# Patient Record
Sex: FEMALE | Race: WHITE | NOT HISPANIC OR LATINO | Employment: OTHER | URBAN - METROPOLITAN AREA
[De-identification: names, ages, dates, MRNs, and addresses within clinical notes are randomized per-mention and may not be internally consistent; named-entity substitution may affect disease eponyms.]

---

## 2017-08-23 ENCOUNTER — NURSE TRIAGE (OUTPATIENT)
Dept: NURSING | Facility: CLINIC | Age: 72
End: 2017-08-23

## 2017-08-23 NOTE — TELEPHONE ENCOUNTER
Caller had called for regular appointment to  be seen for  foot edema and to reestablish care at clinic; Caller sent  To [facility] for triage of intermittent  episodes of localized hives on arms and face for 1 week  Reviewed home care and will try antihistamine  Returned call to scheduling to arrange  Visit this week    Reason for Disposition    Hives persist > 1 week    Protocols used: HIVES-ADULT-  Berta Bills RN  [facility]

## 2017-08-25 ENCOUNTER — OFFICE VISIT (OUTPATIENT)
Dept: FAMILY MEDICINE | Facility: CLINIC | Age: 72
End: 2017-08-25
Payer: COMMERCIAL

## 2017-08-25 VITALS
TEMPERATURE: 97.6 F | WEIGHT: 257 LBS | SYSTOLIC BLOOD PRESSURE: 144 MMHG | HEART RATE: 69 BPM | HEIGHT: 66 IN | BODY MASS INDEX: 41.3 KG/M2 | OXYGEN SATURATION: 98 % | DIASTOLIC BLOOD PRESSURE: 84 MMHG

## 2017-08-25 DIAGNOSIS — R60.0 LOWER EXTREMITY EDEMA: Primary | ICD-10-CM

## 2017-08-25 DIAGNOSIS — R21 RASH: ICD-10-CM

## 2017-08-25 DIAGNOSIS — E55.9 VITAMIN D DEFICIENCY: ICD-10-CM

## 2017-08-25 DIAGNOSIS — I10 HTN, GOAL BELOW 140/90: ICD-10-CM

## 2017-08-25 LAB
ALBUMIN UR-MCNC: NEGATIVE MG/DL
APPEARANCE UR: CLEAR
BILIRUB UR QL STRIP: NEGATIVE
COLOR UR AUTO: YELLOW
CRP SERPL-MCNC: 9.4 MG/L (ref 0–8)
GLUCOSE UR STRIP-MCNC: NEGATIVE MG/DL
HGB UR QL STRIP: ABNORMAL
KETONES UR STRIP-MCNC: NEGATIVE MG/DL
KOH PREP SPEC: ABNORMAL
LEUKOCYTE ESTERASE UR QL STRIP: NEGATIVE
MUCOUS THREADS #/AREA URNS LPF: PRESENT /LPF
NITRATE UR QL: NEGATIVE
NON-SQ EPI CELLS #/AREA URNS LPF: ABNORMAL /LPF
PH UR STRIP: 7 PH (ref 5–7)
RBC #/AREA URNS AUTO: ABNORMAL /HPF
SOURCE: ABNORMAL
SP GR UR STRIP: 1.01 (ref 1–1.03)
SPECIMEN SOURCE: ABNORMAL
UROBILINOGEN UR STRIP-ACNC: 0.2 EU/DL (ref 0.2–1)
WBC #/AREA URNS AUTO: ABNORMAL /HPF

## 2017-08-25 PROCEDURE — 99000 SPECIMEN HANDLING OFFICE-LAB: CPT | Performed by: PHYSICIAN ASSISTANT

## 2017-08-25 PROCEDURE — 86140 C-REACTIVE PROTEIN: CPT | Performed by: PHYSICIAN ASSISTANT

## 2017-08-25 PROCEDURE — 87220 TISSUE EXAM FOR FUNGI: CPT | Performed by: PHYSICIAN ASSISTANT

## 2017-08-25 PROCEDURE — 81001 URINALYSIS AUTO W/SCOPE: CPT | Performed by: PHYSICIAN ASSISTANT

## 2017-08-25 PROCEDURE — 99204 OFFICE O/P NEW MOD 45 MIN: CPT | Performed by: PHYSICIAN ASSISTANT

## 2017-08-25 PROCEDURE — 86618 LYME DISEASE ANTIBODY: CPT | Performed by: PHYSICIAN ASSISTANT

## 2017-08-25 PROCEDURE — 82043 UR ALBUMIN QUANTITATIVE: CPT | Performed by: PHYSICIAN ASSISTANT

## 2017-08-25 PROCEDURE — 36415 COLL VENOUS BLD VENIPUNCTURE: CPT | Performed by: PHYSICIAN ASSISTANT

## 2017-08-25 PROCEDURE — 82306 VITAMIN D 25 HYDROXY: CPT | Performed by: PHYSICIAN ASSISTANT

## 2017-08-25 PROCEDURE — 84443 ASSAY THYROID STIM HORMONE: CPT | Performed by: PHYSICIAN ASSISTANT

## 2017-08-25 PROCEDURE — 86617 LYME DISEASE ANTIBODY: CPT | Mod: 90 | Performed by: PHYSICIAN ASSISTANT

## 2017-08-25 PROCEDURE — 80048 BASIC METABOLIC PNL TOTAL CA: CPT | Performed by: PHYSICIAN ASSISTANT

## 2017-08-25 RX ORDER — CLOTRIMAZOLE 1 %
CREAM (GRAM) TOPICAL 2 TIMES DAILY
Qty: 15 G | Refills: 1 | Status: SHIPPED | OUTPATIENT
Start: 2017-08-25 | End: 2017-09-22

## 2017-08-25 RX ORDER — FLUCONAZOLE 150 MG/1
150 TABLET ORAL
Qty: 4 TABLET | Refills: 0 | Status: SHIPPED | OUTPATIENT
Start: 2017-08-25 | End: 2017-09-22

## 2017-08-25 RX ORDER — LISINOPRIL 5 MG/1
10 TABLET ORAL DAILY
Qty: 30 TABLET | Refills: 2 | Status: SHIPPED | OUTPATIENT
Start: 2017-08-25 | End: 2017-08-25

## 2017-08-25 RX ORDER — LISINOPRIL 5 MG/1
5 TABLET ORAL DAILY
Qty: 30 TABLET | Refills: 2 | Status: SHIPPED | OUTPATIENT
Start: 2017-08-25 | End: 2018-02-14

## 2017-08-25 NOTE — NURSING NOTE
"Chief Complaint   Patient presents with     Hives     swollen feet       Initial /86 (BP Location: Right arm, Cuff Size: Adult Large)  Pulse 69  Temp 97.6  F (36.4  C) (Oral)  Ht 5' 6\" (1.676 m)  Wt 257 lb (116.6 kg)  SpO2 98%  BMI 41.48 kg/m2 Estimated body mass index is 41.48 kg/(m^2) as calculated from the following:    Height as of this encounter: 5' 6\" (1.676 m).    Weight as of this encounter: 257 lb (116.6 kg).  Medication Reconciliation: complete   Andrzej Garrison CMA      "

## 2017-08-25 NOTE — LETTER
August 30, 2017      Critsy Bailey  [address] [facility] [address]        Barak Muniz,    It was nice meeting you last week.  A copy of your labs from the visit is included.     The lyme testing shows that there is no acute/current infection but does show a history of past infections.    The urine showed no protein or infections. But there was just a small amount of blood. We'll repeat this at your next office visit    Screening of the kidneys, electrolytes and for diabetes looked within the expected ranges.    Your thyroid test was normal.     Finally, the vitamin D was just a bit low. I'd recommend taking 5000 international units the next 2-3 months and rechecking.     We'll plan to see you at your next visit. Let us know any questions in the meantime and make sure to check your blood pressure before that!    Kulwant Daley

## 2017-08-25 NOTE — MR AVS SNAPSHOT
"              After Visit Summary   8/25/2017    Crsity Bailey    MRN: [id number]           Patient Information     Date Of Birth          1945        Visit Information        Provider Department      8/25/2017 8:20 AM Manoj Daley PA-C [facility]        Today's Diagnoses     Lower extremity edema    -  1    HTN, goal below 140/90        Rash        Vitamin D deficiency           Follow-ups after your visit        Your next 10 appointments already scheduled     Sep 07, 2017 10:50 AM CDT   [facility] Visit with Jeana Duke MD   [facility]    [address] [facility] [address] [phone]           Bring a current list of meds and any records pertaining to this visit. For Physicals, please bring immunization records and any forms needing to be filled out. Please arrive 10 minutes early to complete paperwork.              Who to contact     If you have questions or need follow up information about today's clinic visit or your schedule please contact [facility] directly at [phone].  Normal or non-critical lab and imaging results will be communicated to you by Anaergiahart, letter or phone within 4 business days after the clinic has received the results. If you do not hear from us within 7 days, please contact the clinic through [email or web address] or phone. If you have a critical or abnormal lab result, we will notify you by phone as soon as possible.  Submit refill requests through This Week In or call your pharmacy and they will forward the refill request to us. Please allow 3 business days for your refill to be completed.          Additional Information About Your Visit        Anaergiahart Information     This Week In lets you send messages to your doctor, view your test results, renew your prescriptions, schedule appointments and more. To sign up, go to [email or web address] . Click on \"Log in\" on the left side of the screen, which " "will take you to the Welcome page. Then click on \"Sign up Now\" on the right side of the page.     You will be asked to enter the access code listed below, as well as some personal information. Please follow the directions to create your username and password.     Your access code is: [email or web address]  Expires: 2017  8:52 AM     Your access code will  in 90 days. If you need help or a new code, please call your [place] clinic or [phone].        Care EveryWhere ID     This is your Care EveryWhere ID. This could be used by other organizations to access your [place] medical records  [id number]        Your Vitals Were     Pulse Temperature Height Pulse Oximetry BMI (Body Mass Index)       69 97.6  F (36.4  C) (Oral) 5' 6\" (1.676 m) 98% 41.48 kg/m2        Blood Pressure from Last 3 Encounters:   17 144/84   03/24/15 128/87   14 126/80    Weight from Last 3 Encounters:   17 257 lb (116.6 kg)   03/24/15 255 lb (115.7 kg)   14 254 lb (115.2 kg)              We Performed the Following     *UA reflex to Microscopic and Culture ([place] and [facility] (except [place] and [place])     Albumin Random Urine Quantitative with Creat Ratio     Basic metabolic panel     KOH prep (skin, hair or nails only)     Lyme Disease Sarina with reflex to WB Serum     TSH with free T4 reflex     Vitamin D Deficiency          Today's Medication Changes          These changes are accurate as of: 17  8:52 AM.  If you have any questions, ask your nurse or doctor.               Start taking these medicines.        Dose/Directions    clotrimazole 1 % cream   Commonly known as:  LOTRIMIN   Used for:  Rash   Started by:  Manoj Daley PA-C        Apply topically 2 times daily   Quantity:  15 g   Refills:  1       fluconazole 150 MG tablet   Commonly known as:  DIFLUCAN   Used for:  Rash   Started by:  Manoj Daley PA-C        Dose:  150 mg   Take 1 tablet (150 mg) by mouth every 3 days "   Quantity:  4 tablet   Refills:  0       order for DME   Used for:  Lower extremity edema   Started by:  Manoj Daley PA-C        Equipment being ordered: Compression stockings 20-30mmHg   Quantity:  2 Package   Refills:  0         These medicines have changed or have updated prescriptions.        Dose/Directions    lisinopril 5 MG tablet   Commonly known as:  PRINIVIL/ZESTRIL   This may have changed:  medication strength   Used for:  HTN, goal below 140/90   Changed by:  Manoj Daley PA-C        Dose:  10 mg   Take 2 tablets (10 mg) by mouth daily ONE DAILY for HTN   Quantity:  30 tablet   Refills:  2            Where to get your medicines      These medications were sent to [id number] - [facility] [address]  [address] [facility] [address]    Hours:  9Am-9Pm (M-F) 9Am-6Pm (S&S) Phone:  [phone]     clotrimazole 1 % cream    fluconazole 150 MG tablet    lisinopril 5 MG tablet         Some of these will need a paper prescription and others can be bought over the counter.  Ask your nurse if you have questions.     Bring a paper prescription for each of these medications     order for DME                Primary Care Provider    None Specified       No primary provider on file.        Equal Access to Services     MICHELLE WISE AH: Janet Martin, watobin esposito, qatonya kaalmada andrea, larry camp. So [facility] [phone].    ATENCIÓN: Si habla español, tiene a pompa disposición servicios gratuitos de asistencia lingüística. Llame al [phone].    We comply with applicable federal civil rights laws and [place] laws. We do not discriminate on the basis of race, color, national origin, age, disability sex, sexual orientation or gender identity.            Thank you!     Thank you for choosing [facility]  for your care. Our goal is always to provide you with excellent care. Hearing back from our patients is  one way we can continue to improve our services. Please take a few minutes to complete the written survey that you may receive in the mail after your visit with us. Thank you!             Your Updated Medication List - Protect others around you: Learn how to safely use, store and throw away your medicines at www.disposemymeds.org.          This list is accurate as of: 8/25/17  8:52 AM.  Always use your most recent med list.                   Brand Name Dispense Instructions for use Diagnosis    clotrimazole 1 % cream    LOTRIMIN    15 g    Apply topically 2 times daily    Rash       fluconazole 150 MG tablet    DIFLUCAN    4 tablet    Take 1 tablet (150 mg) by mouth every 3 days    Rash       lisinopril 5 MG tablet    PRINIVIL/ZESTRIL    30 tablet    Take 2 tablets (10 mg) by mouth daily ONE DAILY for HTN    HTN, goal below 140/90       order for DME     2 Package    Equipment being ordered: Compression stockings 20-30mmHg    Lower extremity edema

## 2017-08-25 NOTE — PROGRESS NOTES
SUBJECTIVE:   Cristy Bailey is a 72 year old female who presents to clinic today for the following health issues:    Patient presents to clinic for the following two reasons  She has not received care for the past few years, reportedly  She presents today in wheelchair   -she notes she developed weakness, difficulty walking and saw 3 neurologist who could  not find anything wrong  She is able to stand and wash dishes as long as she has something to hold onto    Rash      Duration: 10 days ago    Description  Location: arm, neck, face chest,   Itching: severe    Intensity:  moderate    Accompanying signs and symptoms: red, botchy, scaly, round    History (similar episodes/previous evaluation): None    Precipitating or alleviating factors:  New exposures:  None  Recent travel: no      Therapies tried and outcome: Baking soda and water, Cortizone 10     -Patient notes a new onset rash to her extremities x 10 days  -eventually did shift to her trunk and face  -there is no swelling of the lips, tongue;   -she denies shortness of breath or wheezing  -They are round; very itchy  -denies any new medications, foods, environmental exposure, no recent travel; no new soaps/detergents  -there are no new fevers  -cortisone 10 has helped some on the lesions      Musculoskeletal problem/pain      Duration: 1 year    Description  Location: bilateral feet    Intensity:  severe    Accompanying signs and symptoms: swelling and pressure    History  Previous similar problem: no   Previous evaluation:  none    Precipitating or alleviating factors:  Trauma or overuse: no   Aggravating factors include: none    Therapies tried and outcome: massage and soak in water    -Patient notes an intermittent swelling in the lower extremities, now staying more often  -she denies any shortness of breath or cough  -they seem improved in the morning, after sleeping in a recliner  -denies any major weight change but doesn't weight herself  regularly  -there is no increase in urination; goes regularly throughout the day and night  -limits any salt on foods      Problem list and histories reviewed & adjusted, as indicated.  Additional history: as documented    Patient Active Problem List   Diagnosis     HTN (hypertension)     Hyperlipidemia LDL goal <160     Abnormal glucose     Obesity     Advanced directives, counseling/discussion     Leg weakness, bilateral     CRP elevated     Vitamin D deficiency     Health Care Home     HTN, goal below 140/90     Constipation     Leg weakness     Constipation     Myalgia and myositis     Past Surgical History:   Procedure Laterality Date     BIOPSY OF UTERUS LINING  3/2010    negative.      C C-SEC ONLY,PREV C-SEC      c-sec x 3      COLONOSCOPY  2/21/10    benign findings. advised 10 yr f/u.        Social History   Substance Use Topics     Smoking status: Former Smoker     Quit date: 1984     Smokeless tobacco: Never Used     Alcohol use No     Family History   Problem Relation Age of Onset     CEREBROVASCULAR DISEASE Father      -stroke at age 80     Family History Negative Mother       Diedn her 80's of unknown causes.  Pt otherwise unaware of her health hx.      Unknown/Adopted Mother      DIABETES Brother      (Christian)  of DM complications at age 50.     Alcohol/Drug Brother      Christian     C.A.D. Brother      Christian.      HEART DISEASE Brother      Christian--MI age 50.     Family History Negative Brother      Family History Negative Brother      Family History Negative Sister      Family History Negative Sister      Family History Negative Son      Family History Negative Daughter      Family History Negative Daughter              Reviewed and updated as needed this visit by clinical staffTobacco  Allergies  Meds  Med Hx  Surg Hx  Fam Hx  Soc Hx      Reviewed and updated as needed this visit by Provider         ROS:  Constitutional, HEENT, cardiovascular, pulmonary, gi and gu systems are  "negative, except as otherwise noted.      OBJECTIVE:   /84  Pulse 69  Temp 97.6  F (36.4  C) (Oral)  Ht 5' 6\" (1.676 m)  Wt 257 lb (116.6 kg)  SpO2 98%  BMI 41.48 kg/m2  Body mass index is 41.48 kg/(m^2).  GENERAL: healthy, alert and no distress  NECK: no JVD  RESP: lungs clear to auscultation - no rales, rhonchi or wheezes  CV: regular rates and rhythm, normal S1 S2, no S3 or S4 and no murmur, click or rub  MS: there is 1+ pitting edema in bilateral extremeties  SKIN: along the left forearm, sternal border and left face/neck there are circular lesions with red raised borders and central clearing    Diagnostic Test Results:  See A/P  KOH Prep positive    ASSESSMENT/PLAN:   1. HTN, goal below 140/90  Uncontrolled today. Off of Rx for at least 6 months. SHe does take this at home and reports it is normally controlled without medication but given poor control today will see how it is at 5mg. Recheck at pharmacy next week. In clinic in one month  - Basic metabolic panel  - lisinopril (PRINIVIL/ZESTRIL) 5 MG tablet; Take 1 tablet (5 mg) by mouth daily ONE DAILY for HTN  Dispense: 30 tablet; Refill: 2    2. Lower extremity edema  Suspect this is more dependent than anything. She is in wheelchair daily for a mysterious reason, cause undetermined. Lungs are clear, there is no cough. Updating labs and will have her start compression stockings  - Basic metabolic panel  - TSH with free T4 reflex  - *UA reflex to Microscopic and Culture ([place] [facility] (except [place] and [place])  - Albumin Random Urine Quantitative with Creat Ratio  - order for DME; Equipment being ordered: Compression stockings 20-30mmHg  Dispense: 2 Package; Refill: 0    3. Rash  KOH positive. Less likely urticaria, favoring tinea. Given the spread and unknown LFTs will have her do topical and short course fluconazole orally dosed. Follow up if not improving  - KOH prep (skin, hair or nails only)  - Lyme Disease Sarina with reflex " to WB Serum  - clotrimazole (LOTRIMIN) 1 % cream; Apply topically 2 times daily  Dispense: 15 g; Refill: 1  - fluconazole (DIFLUCAN) 150 MG tablet; Take 1 tablet (150 mg) by mouth every 3 days  Dispense: 4 tablet; Refill: 0    4. Vitamin D deficiency  - Vitamin D Deficiency    She will follow up in clinic in 4 weeks for update and to discuss other concerns.    I spent >50 % of the 35 minutes in face to face counselling or coordination of care for the above issues as she transitions back to medical care.      Manoj Daley PA-C  [facility]

## 2017-08-26 LAB
ANION GAP SERPL CALCULATED.3IONS-SCNC: 8 MMOL/L (ref 3–14)
B BURGDOR IGG+IGM SER QL: 3.41 (ref 0–0.89)
BUN SERPL-MCNC: 13 MG/DL (ref 7–30)
CALCIUM SERPL-MCNC: 8.6 MG/DL (ref 8.5–10.1)
CHLORIDE SERPL-SCNC: 106 MMOL/L (ref 94–109)
CO2 SERPL-SCNC: 26 MMOL/L (ref 20–32)
CREAT SERPL-MCNC: 0.63 MG/DL (ref 0.52–1.04)
CREAT UR-MCNC: 130 MG/DL
GFR SERPL CREATININE-BSD FRML MDRD: >90 ML/MIN/1.7M2
GLUCOSE SERPL-MCNC: 91 MG/DL (ref 70–99)
MICROALBUMIN UR-MCNC: 8 MG/L
MICROALBUMIN/CREAT UR: 6.01 MG/G CR (ref 0–25)
POTASSIUM SERPL-SCNC: 3.9 MMOL/L (ref 3.4–5.3)
SODIUM SERPL-SCNC: 140 MMOL/L (ref 133–144)
TSH SERPL DL<=0.005 MIU/L-ACNC: 2 MU/L (ref 0.4–4)

## 2017-08-27 LAB — DEPRECATED CALCIDIOL+CALCIFEROL SERPL-MC: 24 UG/L (ref 20–75)

## 2017-08-28 LAB
B BURGDOR IGG SER QL IB: NEGATIVE
B BURGDOR IGM SER QL IB: NEGATIVE

## 2017-09-22 ENCOUNTER — OFFICE VISIT (OUTPATIENT)
Dept: FAMILY MEDICINE | Facility: CLINIC | Age: 72
End: 2017-09-22
Payer: COMMERCIAL

## 2017-09-22 VITALS
TEMPERATURE: 97.8 F | DIASTOLIC BLOOD PRESSURE: 82 MMHG | BODY MASS INDEX: 41.72 KG/M2 | SYSTOLIC BLOOD PRESSURE: 136 MMHG | HEART RATE: 72 BPM | RESPIRATION RATE: 20 BRPM | WEIGHT: 258.5 LBS

## 2017-09-22 DIAGNOSIS — I10 HTN, GOAL BELOW 140/90: ICD-10-CM

## 2017-09-22 DIAGNOSIS — R29.898 WEAKNESS OF BOTH LOWER EXTREMITIES: ICD-10-CM

## 2017-09-22 DIAGNOSIS — R26.81 UNSTEADINESS ON FEET: Primary | ICD-10-CM

## 2017-09-22 DIAGNOSIS — Z23 NEED FOR PROPHYLACTIC VACCINATION AND INOCULATION AGAINST INFLUENZA: ICD-10-CM

## 2017-09-22 DIAGNOSIS — E66.01 MORBID OBESITY, UNSPECIFIED OBESITY TYPE (H): ICD-10-CM

## 2017-09-22 PROCEDURE — G0008 ADMIN INFLUENZA VIRUS VAC: HCPCS | Performed by: PHYSICIAN ASSISTANT

## 2017-09-22 PROCEDURE — 90662 IIV NO PRSV INCREASED AG IM: CPT | Performed by: PHYSICIAN ASSISTANT

## 2017-09-22 PROCEDURE — 99214 OFFICE O/P EST MOD 30 MIN: CPT | Mod: 25 | Performed by: PHYSICIAN ASSISTANT

## 2017-09-22 RX ORDER — LISINOPRIL 5 MG/1
5 TABLET ORAL DAILY
Qty: 90 TABLET | Refills: 1 | Status: SHIPPED | OUTPATIENT
Start: 2017-09-22 | End: 2018-02-13

## 2017-09-22 RX ORDER — LISINOPRIL 5 MG/1
5 TABLET ORAL DAILY
Qty: 30 TABLET | Refills: 2 | Status: CANCELLED | OUTPATIENT
Start: 2017-09-22

## 2017-09-22 NOTE — NURSING NOTE
"Chief Complaint   Patient presents with     Hypertension       Initial /84  Pulse 72  Temp 97.8  F (36.6  C) (Oral)  Resp 20  Wt 258 lb 8 oz (117.3 kg)  BMI 41.72 kg/m2 Estimated body mass index is 41.72 kg/(m^2) as calculated from the following:    Height as of 8/25/17: 5' 6\" (1.676 m).    Weight as of this encounter: 258 lb 8 oz (117.3 kg).  Medication Reconciliation: complete   Libertad FITZPATRICK, LEANDRO,AAMA      "

## 2017-09-22 NOTE — MR AVS SNAPSHOT
After Visit Summary   9/22/2017    Cristy Bailey    MRN: [id number]           Patient Information     Date Of Birth          1945        Visit Information        Provider Department      9/22/2017 8:00 AM Manoj Daley PA-C [facility]        Today's Diagnoses     Unsteadiness on feet    -  1    HTN, goal below 140/90        Weakness of both lower extremities          Care Instructions    We'll figure out your leg weakness/walking - please continue to be motivated!    Here's our plan:  1. Neurologist (brain/nerve doctors)  2. Rheumatology/Infectious disease  3. Occupational therapy/physical therapy          Follow-ups after your visit        Additional Services     NEUROLOGY ADULT REFERRAL       Your provider has referred you for the following:   Consult at [facility]: [facility] [phone]   [email or web address]  [facility]: [facility] [phone]   [email or web address]    Please be aware that coverage of these services is subject to the terms and limitations of your health insurance plan.  Call member services at your health plan with any benefit or coverage questions.      Please bring the following with you to your appointment:    (1) Any X-Rays, CTs or MRIs which have been performed.  Contact the facility where they were done to arrange for  prior to your scheduled appointment.    (2) List of current medications  (3) This referral request   (4) Any documents/labs given to you for this referral                  Who to contact     If you have questions or need follow up information about today's clinic visit or your schedule please contact [facility] directly at [phone].  Normal or non-critical lab and imaging results will be communicated to you by MyChart, letter or phone within 4 business days after the clinic has received the results. If you do  "not hear from us within 7 days, please contact the clinic through [employer] or phone. If you have a critical or abnormal lab result, we will notify you by phone as soon as possible.  Submit refill requests through [employer] or call your pharmacy and they will forward the refill request to us. Please allow 3 business days for your refill to be completed.          Additional Information About Your Visit        [email or web address] Information     [employer] lets you send messages to your doctor, view your test results, renew your prescriptions, schedule appointments and more. To sign up, go to [email or web address] . Click on \"Log in\" on the left side of the screen, which will take you to the Welcome page. Then click on \"Sign up Now\" on the right side of the page.     You will be asked to enter the access code listed below, as well as some personal information. Please follow the directions to create your username and password.     Your access code is: [email or web address]  Expires: 2017  8:52 AM     Your access code will  in 90 days. If you need help or a new code, please call your [place] clinic or [phone].        Care EveryWhere ID     This is your Care EveryWhere ID. This could be used by other organizations to access your [place] medical records  [id number]        Your Vitals Were     Pulse Temperature Respirations BMI (Body Mass Index)          72 97.8  F (36.6  C) (Oral) 20 41.72 kg/m2         Blood Pressure from Last 3 Encounters:   17 136/82   17 144/84   03/24/15 128/87    Weight from Last 3 Encounters:   17 258 lb 8 oz (117.3 kg)   17 257 lb (116.6 kg)   03/24/15 255 lb (115.7 kg)              We Performed the Following     NEUROLOGY ADULT REFERRAL          Today's Medication Changes          These changes are accurate as of: 17  8:40 AM.  If you have any questions, ask your nurse or doctor.               These medicines have changed or have updated prescriptions.        " Dose/Directions    * lisinopril 5 MG tablet   Commonly known as:  PRINIVIL/ZESTRIL   This may have changed:  Another medication with the same name was added. Make sure you understand how and when to take each.   Used for:  HTN, goal below 140/90   Changed by:  Manoj Daley PA-C        Dose:  5 mg   Take 1 tablet (5 mg) by mouth daily ONE DAILY for HTN   Quantity:  30 tablet   Refills:  2       * lisinopril 5 MG tablet   Commonly known as:  PRINIVIL/ZESTRIL   This may have changed:  You were already taking a medication with the same name, and this prescription was added. Make sure you understand how and when to take each.   Used for:  HTN, goal below 140/90   Changed by:  Manoj Daley PA-C        Dose:  5 mg   Take 1 tablet (5 mg) by mouth daily   Quantity:  90 tablet   Refills:  1       * Notice:  This list has 2 medication(s) that are the same as other medications prescribed for you. Read the directions carefully, and ask your doctor or other care provider to review them with you.         Where to get your medicines      These medications were sent to [id number] - [facility] [address]  [address] [facility] [address]    Hours:  9Am-9Pm (M-F) 9Am-6Pm (S&S) Phone:  [phone]     lisinopril 5 MG tablet                Primary Care Provider    None Specified       No primary provider on file.        Equal Access to Services     [facility] Center[facility]IPRIANO : Hadii braden quintana Sosusie, waaxda luqadaha, qaybta kaalmada andrea, larry davalos . So [facility] [phone].    ATENCIÓN: Si habla español, tiene a pompa disposición servicios gratuitos de asistencia lingüística. Llame al [phone].    We comply with applicable federal civil rights laws and [place] laws. We do not discriminate on the basis of race, color, national origin, age, disability sex, sexual orientation or gender identity.            Thank you!     Thank you for choosing [facility]  [facility]  for your care. Our goal is always to provide you with excellent care. Hearing back from our patients is one way we can continue to improve our services. Please take a few minutes to complete the written survey that you may receive in the mail after your visit with us. Thank you!             Your Updated Medication List - Protect others around you: Learn how to safely use, store and throw away your medicines at www.disposemymeds.org.          This list is accurate as of: 9/22/17  8:40 AM.  Always use your most recent med list.                   Brand Name Dispense Instructions for use Diagnosis    * lisinopril 5 MG tablet    PRINIVIL/ZESTRIL    30 tablet    Take 1 tablet (5 mg) by mouth daily ONE DAILY for HTN    HTN, goal below 140/90       * lisinopril 5 MG tablet    PRINIVIL/ZESTRIL    90 tablet    Take 1 tablet (5 mg) by mouth daily    HTN, goal below 140/90       order for DME     2 Package    Equipment being ordered: Compression stockings 20-30mmHg    Lower extremity edema       * Notice:  This list has 2 medication(s) that are the same as other medications prescribed for you. Read the directions carefully, and ask your doctor or other care provider to review them with you.

## 2017-09-22 NOTE — PATIENT INSTRUCTIONS
We'll figure out your leg weakness/walking - please continue to be motivated!    Here's our plan:  1. Neurologist (brain/nerve doctors)  2. Rheumatology/Infectious disease  3. Occupational therapy/physical therapy

## 2017-09-22 NOTE — PROGRESS NOTES
"  SUBJECTIVE:   Cristy Bailey is a 72 year old female who presents to clinic today for the following health issues:    Hypertension Follow-up      Outpatient blood pressures are being checked at home.  Results are within normal range per Pt.    Low Salt Diet: low salt    Amount of exercise or physical activity: None    Problems taking medications regularly: No    Medication side effects: none     Diet: regular (no restrictions) and low salt    -Patient presents to follow up for blood pressure checks  -We started her on lisinopril 5mg and she has tolerated this well  -She notes that her BP at home, she has been checking regularly   -she has 2 separate cuffs and notes consistently healthy    -getting around 120-124/69-71 almost always   -elevated initially today but she notes some nervousness    LEG SWELLING:  Swelling is improved in the morning and when removing the compression stockings  They are very difficult to put on, mildly irritating to wear but she and her spouse are diligent about wearing them  Actually notes that when she takes the swelling stays off for a good period of time  Feet are much smaller - actually fitting into some of her shoes    RASH  -she started and finished the cream and oral Rx for tinea   -tolerated the medications well  -rash is improved    LEG WEAKNESS/IMBALANCE:  Patient notes an insidious onset of leg weakness beginning around 2012/13  She reports she used to walk quite well, even quickly  Slowly her strength degraded, she felt slower and slower; felt legs weakening after too much time on them; moving slower  She has now been in a wheel chair for years  Notably, she describes symptoms like  \"she cant balance\", \"hard to describe\", \"feels off in my hips\"; Feeling unsteady  DOES NOT feel dizzy  No vision changes  Denies numbness and tingling  Labs grossly normal save for elevated CRP (mild - 9.4), low vitamin D      Problem list and histories reviewed & adjusted, as " indicated.  Additional history: as documented    Patient Active Problem List   Diagnosis     HTN (hypertension)     Hyperlipidemia LDL goal <160     Abnormal glucose     Obesity     Advanced directives, counseling/discussion     Leg weakness, bilateral     CRP elevated     Vitamin D deficiency     Health Care Home     HTN, goal below 140/90     Constipation     Leg weakness     Constipation     Myalgia and myositis     Past Surgical History:   Procedure Laterality Date     BIOPSY OF UTERUS LINING  3/2010    negative.      C C-SEC ONLY,PREV C-SEC      c-sec x 3      COLONOSCOPY  2/21/10    benign findings. advised 10 yr f/u.        Social History   Substance Use Topics     Smoking status: Former Smoker     Quit date: 1984     Smokeless tobacco: Never Used     Alcohol use No     Family History   Problem Relation Age of Onset     CEREBROVASCULAR DISEASE Father      -stroke at age 80     Family History Negative Mother       Diedn her 80's of unknown causes.  Pt otherwise unaware of her health hx.      Unknown/Adopted Mother      DIABETES Brother      (Christian)  of DM complications at age 50.     Alcohol/Drug Brother      Christian     C.A.D. Brother      Christian.      HEART DISEASE Brother      Christian--MI age 50.     Family History Negative Brother      Family History Negative Brother      Family History Negative Sister      Family History Negative Sister      Family History Negative Son      Family History Negative Daughter      Family History Negative Daughter              Reviewed and updated as needed this visit by clinical staffTobacco  Allergies  Med Hx  Surg Hx  Fam Hx  Soc Hx      Reviewed and updated as needed this visit by Provider         ROS:  Constitutional, HEENT, cardiovascular, pulmonary, gi and gu systems are negative, except as otherwise noted.      OBJECTIVE:   /82  Pulse 72  Temp 97.8  F (36.6  C) (Oral)  Resp 20  Wt 258 lb 8 oz (117.3 kg)  BMI 41.72 kg/m2  Body mass index  "is 41.72 kg/(m^2).  GENERAL: healthy, alert and no distress  EYES: Eyes grossly normal to inspection, PERRL and conjunctivae and sclerae normal  RESP: lungs clear to auscultation - no rales, rhonchi or wheezes  CV: regular rates and rhythm, normal S1 S2, no S3 or S4 and no murmur, click or rub  MS: presents in wheelchair. Strength testing against resistance with knee flex/ext; seated hip ext/abd/adduction testing grossly normal though with some fatiguing; not sure I would classify it as early. She can  clinic but needs to brace her self onto something  SKIN: no suspicious lesions or rashes  NEURO: sensory exam grossly normal, mentation intact and cranial nerves grossly  intact  PSYCH: mentation appears normal, affect normal and tearful at times    Diagnostic Test Results:  none     ASSESSMENT/PLAN:   1. Unsteadiness on feet  2. Weakness of both lower extremities  Unclear etiology to this mysterious weakening. She did see rheumatology once, and describes testing to what sounds like an EMG but I cannot find any records. Her CRP is abnormal but other labs have been generally ok. I would like her to start with a neuro consult given her description of feeling \"unsteady, not dizzy, just off balance\". We can then consider rheum v ID, especially given her hx of lymes. If none of these are fruitful we'll plan ortho and ot/pt to help with this. She was agreeable to the plan- NEUROLOGY ADULT REFERRAL    3. Morbid obesity, unspecified obesity type (H)  We reviewed this today. This certainly is partially due to her inability to be very active and may actually be adding to her leg symptoms. Monitor diet    4. HTN, goal below 140/90  Controlled today. Continue present management.   - lisinopril (PRINIVIL/ZESTRIL) 5 MG tablet; Take 1 tablet (5 mg) by mouth daily  Dispense: 90 tablet; Refill: 1    5. Need for prophylactic vaccination and inoculation against influenza  - FLU VACCINE, INCREASED ANTIGEN, PRESV FREE, AGE 65+ " [73594]  - Vaccine Administration, Initial [31178]    Manoj Daley PA-C  [facility]

## 2017-10-20 ENCOUNTER — TELEPHONE (OUTPATIENT)
Dept: FAMILY MEDICINE | Facility: CLINIC | Age: 72
End: 2017-10-20

## 2017-10-20 NOTE — TELEPHONE ENCOUNTER
Panel Management Review      Patient has the following on her problem list: None      Composite cancer screening  Chart review shows that this patient is due/due soon for the following Mammogram  Summary:    Patient is due/failing the following:   MAMMOGRAM    Action needed:   Patient needs office visit for Mammo.    Type of outreach:    Phone, left message for patient to call back.     Questions for provider review:    None                                                                                                                                    Andrzej Garrison [facility]       Chart routed to Care Team .

## 2017-11-20 DIAGNOSIS — R21 RASH: ICD-10-CM

## 2017-11-22 NOTE — TELEPHONE ENCOUNTER
Routing refill request to provider for review/approval because:  Drug not active on patient's medication list, given for rash 8/17.  Rash improved at last visit. Decline?  Vee Montalvo, RN  Triage Nurse

## 2017-11-26 RX ORDER — CLOTRIMAZOLE 1 %
CREAM (GRAM) TOPICAL
Qty: 15 G | Refills: 0 | Status: SHIPPED | OUTPATIENT
Start: 2017-11-26 | End: 2018-05-01

## 2018-01-16 ENCOUNTER — TELEPHONE (OUTPATIENT)
Dept: FAMILY MEDICINE | Facility: CLINIC | Age: 73
End: 2018-01-16

## 2018-01-16 NOTE — TELEPHONE ENCOUNTER
"1/16/2018      Patient Communication Preferences indicate  Do not contact  and/or communication by \"Phone\" is not preferred. Call not required per Outreach team.          Outreach ,  Traci Nguyễn    "

## 2018-02-08 ENCOUNTER — HOSPITAL ENCOUNTER (EMERGENCY)
Facility: CLINIC | Age: 73
Discharge: HOME | End: 2018-02-08
Attending: EMERGENCY MEDICINE | Admitting: EMERGENCY MEDICINE
Payer: COMMERCIAL

## 2018-02-08 ENCOUNTER — APPOINTMENT (OUTPATIENT)
Dept: ULTRASOUND IMAGING | Facility: CLINIC | Age: 73
End: 2018-02-08
Attending: EMERGENCY MEDICINE
Payer: COMMERCIAL

## 2018-02-08 VITALS
TEMPERATURE: 97.5 F | HEART RATE: 87 BPM | RESPIRATION RATE: 18 BRPM | SYSTOLIC BLOOD PRESSURE: 104 MMHG | BODY MASS INDEX: 40.18 KG/M2 | HEIGHT: 66 IN | DIASTOLIC BLOOD PRESSURE: 89 MMHG | WEIGHT: 250 LBS | OXYGEN SATURATION: 94 %

## 2018-02-08 DIAGNOSIS — M79.605 BILATERAL LEG PAIN: ICD-10-CM

## 2018-02-08 DIAGNOSIS — M79.604 BILATERAL LEG PAIN: ICD-10-CM

## 2018-02-08 LAB
ANION GAP SERPL CALCULATED.3IONS-SCNC: 6 MMOL/L (ref 3–14)
BASOPHILS # BLD AUTO: 0 10E9/L (ref 0–0.2)
BASOPHILS NFR BLD AUTO: 0.4 %
BUN SERPL-MCNC: 23 MG/DL (ref 7–30)
CALCIUM SERPL-MCNC: 8.9 MG/DL (ref 8.5–10.1)
CHLORIDE SERPL-SCNC: 108 MMOL/L (ref 94–109)
CO2 SERPL-SCNC: 27 MMOL/L (ref 20–32)
CREAT SERPL-MCNC: 0.64 MG/DL (ref 0.52–1.04)
DIFFERENTIAL METHOD BLD: NORMAL
EOSINOPHIL # BLD AUTO: 0.2 10E9/L (ref 0–0.7)
EOSINOPHIL NFR BLD AUTO: 2.8 %
ERYTHROCYTE [DISTWIDTH] IN BLOOD BY AUTOMATED COUNT: 13.4 % (ref 10–15)
GFR SERPL CREATININE-BSD FRML MDRD: >90 ML/MIN/1.7M2
GLUCOSE SERPL-MCNC: 104 MG/DL (ref 70–99)
HCT VFR BLD AUTO: 43.4 % (ref 35–47)
HGB BLD-MCNC: 14 G/DL (ref 11.7–15.7)
IMM GRANULOCYTES # BLD: 0 10E9/L (ref 0–0.4)
IMM GRANULOCYTES NFR BLD: 0.3 %
LYMPHOCYTES # BLD AUTO: 1.5 10E9/L (ref 0.8–5.3)
LYMPHOCYTES NFR BLD AUTO: 21.9 %
MCH RBC QN AUTO: 29 PG (ref 26.5–33)
MCHC RBC AUTO-ENTMCNC: 32.3 G/DL (ref 31.5–36.5)
MCV RBC AUTO: 90 FL (ref 78–100)
MONOCYTES # BLD AUTO: 0.7 10E9/L (ref 0–1.3)
MONOCYTES NFR BLD AUTO: 9.6 %
NEUTROPHILS # BLD AUTO: 4.5 10E9/L (ref 1.6–8.3)
NEUTROPHILS NFR BLD AUTO: 65 %
NRBC # BLD AUTO: 0 10*3/UL
NRBC BLD AUTO-RTO: 0 /100
PLATELET # BLD AUTO: 280 10E9/L (ref 150–450)
POTASSIUM SERPL-SCNC: 4.3 MMOL/L (ref 3.4–5.3)
RBC # BLD AUTO: 4.82 10E12/L (ref 3.8–5.2)
SODIUM SERPL-SCNC: 141 MMOL/L (ref 133–144)
WBC # BLD AUTO: 6.9 10E9/L (ref 4–11)

## 2018-02-08 PROCEDURE — 25000132 ZZH RX MED GY IP 250 OP 250 PS 637: Performed by: EMERGENCY MEDICINE

## 2018-02-08 PROCEDURE — 85025 COMPLETE CBC W/AUTO DIFF WBC: CPT | Performed by: EMERGENCY MEDICINE

## 2018-02-08 PROCEDURE — 80048 BASIC METABOLIC PNL TOTAL CA: CPT | Performed by: EMERGENCY MEDICINE

## 2018-02-08 PROCEDURE — 25000128 H RX IP 250 OP 636: Performed by: EMERGENCY MEDICINE

## 2018-02-08 PROCEDURE — 93970 EXTREMITY STUDY: CPT

## 2018-02-08 PROCEDURE — 99285 EMERGENCY DEPT VISIT HI MDM: CPT | Mod: 25

## 2018-02-08 PROCEDURE — 96372 THER/PROPH/DIAG INJ SC/IM: CPT

## 2018-02-08 RX ORDER — OXYCODONE HYDROCHLORIDE 5 MG/1
5 TABLET ORAL EVERY 6 HOURS PRN
Qty: 14 TABLET | Refills: 0 | Status: SHIPPED | OUTPATIENT
Start: 2018-02-08 | End: 2018-03-27

## 2018-02-08 RX ORDER — HYDROMORPHONE HYDROCHLORIDE 1 MG/ML
0.5 INJECTION, SOLUTION INTRAMUSCULAR; INTRAVENOUS; SUBCUTANEOUS ONCE
Status: COMPLETED | OUTPATIENT
Start: 2018-02-08 | End: 2018-02-08

## 2018-02-08 RX ORDER — OXYCODONE HYDROCHLORIDE 5 MG/1
5 TABLET ORAL ONCE
Status: COMPLETED | OUTPATIENT
Start: 2018-02-08 | End: 2018-02-08

## 2018-02-08 RX ADMIN — OXYCODONE HYDROCHLORIDE 5 MG: 5 TABLET ORAL at 20:54

## 2018-02-08 RX ADMIN — Medication 0.5 MG: at 19:41

## 2018-02-08 ASSESSMENT — ENCOUNTER SYMPTOMS: FEVER: 0

## 2018-02-08 NOTE — ED AVS SNAPSHOT
[facility] Emergency Department    [address]    [facility] [address]    Phone:  [phone]    Fax:  [phone]                                       Cristy Bailey   MRN: [id number]    Department:  [facility] Emergency Department   Date of Visit:  2/8/2018           Patient Information     Date Of Birth          1945        Your diagnoses for this visit were:     Bilateral leg pain        You were seen by Kiera Villagran MD.      Follow-up Information     Follow up with [facility] In 3 days.    Contact information:    [address]  [facility] [address]  [phone]          Schedule an appointment as soon as possible for a visit with [facility].    Contact information:    [address], [facility] [address]  [facility] [address]  [phone]          Discharge Instructions       Please follow up closely with your regular physician.     Please return to the ED if your symptoms worsen or if you develop new or concerning symptoms.         24 Hour Appointment Hotline       To make an appointment at any [facility] clinic, call [phone] ([phone]). If you don't have a family doctor or clinic, we will help you find one. [facility] clinics are conveniently located to serve the needs of you and your family.             Review of your medicines      START taking        Dose / Directions Last dose taken    oxyCODONE IR 5 MG tablet   Commonly known as:  ROXICODONE   Dose:  5 mg   Quantity:  14 tablet        Take 1 tablet (5 mg) by mouth every 6 hours as needed for pain   Refills:  0          Our records show that you are taking the medicines listed below. If these are incorrect, please call your family doctor or clinic.        Dose / Directions Last dose taken    clotrimazole 1 % cream   Commonly known as:  LOTRIMIN   Quantity:  15 g        APPLY TO AFFECTED AREA TOPICALLY 2 TIMES A DAY.   Refills:  0        * lisinopril 5 MG  tablet   Commonly known as:  PRINIVIL/ZESTRIL   Dose:  5 mg   Quantity:  30 tablet        Take 1 tablet (5 mg) by mouth daily ONE DAILY for HTN   Refills:  2        * lisinopril 5 MG tablet   Commonly known as:  PRINIVIL/ZESTRIL   Dose:  5 mg   Quantity:  90 tablet        Take 1 tablet (5 mg) by mouth daily   Refills:  1        order for DME   Quantity:  2 Package        Equipment being ordered: Compression stockings 20-30mmHg   Refills:  0        * Notice:  This list has 2 medication(s) that are the same as other medications prescribed for you. Read the directions carefully, and ask your doctor or other care provider to review them with you.            Prescriptions were sent or printed at these locations (1 Prescription)                   Other Prescriptions                Printed at Department/Unit printer (1 of 1)         oxyCODONE IR (ROXICODONE) 5 MG tablet                Procedures and tests performed during your visit     Basic metabolic panel (BMP)    CBC + differential    US Lower Extremity Venous Duplex Bilateral      Orders Needing Specimen Collection     None      Pending Results     Date and Time Order Name Status Description    2/8/2018 1913 US Lower Extremity Venous Duplex Bilateral Preliminary             Pending Culture Results     No orders found from 2/6/2018 to 2/9/2018.            Pending Results Instructions     If you had any lab results that were not finalized at the time of your Discharge, you can call the ED Lab Result RN at [phone]. You will be contacted by this team for any positive Lab results or changes in treatment. The nurses are available 7 days a week from 10A to 6:30P.  You can leave a message 24 hours per day and they will return your call.        Test Results From Your Hospital Stay        2/8/2018  8:21 PM      Narrative     ULTRASOUND VENOUS BILATERAL LOWER EXTREMITIES WITH DOPPLER   2/8/2018  8:15 PM     HISTORY: Bilateral leg pain.    COMPARISON: None.    TECHNIQUE:  Spectral waveform and color Doppler evaluation were  performed.    FINDINGS: Normal compressibility of bilateral common femoral, femoral,  popliteal, posterior tibial, peroneal and greater saphenous veins.  Unremarkable Doppler waveform evaluation of bilateral common femoral,  femoral and popliteal veins.        Impression     IMPRESSION: No evidence of thrombus in the major veins of bilateral  lower extremities.         2/8/2018  7:25 PM      Component Results     Component Value Ref Range & Units Status    WBC 6.9 4.0 - 11.0 10e9/L Final    RBC Count 4.82 3.8 - 5.2 10e12/L Final    Hemoglobin 14.0 11.7 - 15.7 g/dL Final    Hematocrit 43.4 35.0 - 47.0 % Final    MCV 90 78 - 100 fl Final    MCH 29.0 26.5 - 33.0 pg Final    MCHC 32.3 31.5 - 36.5 g/dL Final    RDW 13.4 10.0 - 15.0 % Final    Platelet Count 280 150 - 450 10e9/L Final    Diff Method Automated Method  Final    % Neutrophils 65.0 % Final    % Lymphocytes 21.9 % Final    % Monocytes 9.6 % Final    % Eosinophils 2.8 % Final    % Basophils 0.4 % Final    % Immature Granulocytes 0.3 % Final    Nucleated RBCs 0 0 /100 Final    Absolute Neutrophil 4.5 1.6 - 8.3 10e9/L Final    Absolute Lymphocytes 1.5 0.8 - 5.3 10e9/L Final    Absolute Monocytes 0.7 0.0 - 1.3 10e9/L Final    Absolute Eosinophils 0.2 0.0 - 0.7 10e9/L Final    Absolute Basophils 0.0 0.0 - 0.2 10e9/L Final    Abs Immature Granulocytes 0.0 0 - 0.4 10e9/L Final    Absolute Nucleated RBC 0.0  Final         2/8/2018  7:38 PM      Component Results     Component Value Ref Range & Units Status    Sodium 141 133 - 144 mmol/L Final    Potassium 4.3 3.4 - 5.3 mmol/L Final    Chloride 108 94 - 109 mmol/L Final    Carbon Dioxide 27 20 - 32 mmol/L Final    Anion Gap 6 3 - 14 mmol/L Final    Glucose 104 (H) 70 - 99 mg/dL Final    Urea Nitrogen 23 7 - 30 mg/dL Final    Creatinine 0.64 0.52 - 1.04 mg/dL Final    GFR Estimate >90 >60 mL/min/1.7m2 Final    Non  GFR Calc    GFR Estimate If Black >90  >60 mL/min/1.7m2 Final    African American GFR Calc    Calcium 8.9 8.5 - 10.1 mg/dL Final                Clinical Quality Measure: Blood Pressure Screening     Your blood pressure was checked while you were in the emergency department today. The last reading we obtained was  BP: 104/89 . Please read the guidelines below about what these numbers mean and what you should do about them.  If your systolic blood pressure (the top number) is less than 120 and your diastolic blood pressure (the bottom number) is less than 80, then your blood pressure is normal. There is nothing more that you need to do about it.  If your systolic blood pressure (the top number) is 120-139 or your diastolic blood pressure (the bottom number) is 80-89, your blood pressure may be higher than it should be. You should have your blood pressure rechecked within a year by a primary care provider.  If your systolic blood pressure (the top number) is 140 or greater or your diastolic blood pressure (the bottom number) is 90 or greater, you may have high blood pressure. High blood pressure is treatable, but if left untreated over time it can put you at risk for heart attack, stroke, or kidney failure. You should have your blood pressure rechecked by a primary care provider within the next 4 weeks.  If your provider in the emergency department today gave you specific instructions to follow-up with your doctor or provider even sooner than that, you should follow that instruction and not wait for up to 4 weeks for your follow-up visit.        Thank you for choosing [facility]       Thank you for choosing [facility] for your care. Our goal is always to provide you with excellent care. Hearing back from our patients is one way we can continue to improve our services. Please take a few minutes to complete the written survey that you may receive in the mail after you visit with us. Thank you!        [facility] Information     [facility] gives you secure access to your  electronic health record. If you see a primary care provider, you can also send messages to your care team and make appointments. If you have questions, please call your primary care clinic.  If you do not have a primary care provider, please call [phone] and they will assist you.        Care EveryWhere ID     This is your Care EveryWhere ID. This could be used by other organizations to access your [facility] medical records  [id number]        Equal Access to Services     MICHELLE WISE : Janet Martin, jeyson esposito, elsi mendez, larry davalos . So [facility] [phone].    ATENCIÓN: Si habla español, tiene a pompa disposición servicios gratuitos de asistencia lingüística. Llame al [phone].    We comply with applicable federal civil rights laws and [place] laws. We do not discriminate on the basis of race, color, national origin, age, disability, sex, sexual orientation, or gender identity.            After Visit Summary       This is your record. Keep this with you and show to your community pharmacist(s) and doctor(s) at your next visit.

## 2018-02-08 NOTE — ED AVS SNAPSHOT
[facility] Emergency Department    [address]    [facility] [address]    Phone:  [phone]    Fax:  [phone]                                       Cristy Bailey   MRN: [id number]    Department:  [facility] Emergency Department   Date of Visit:  2/8/2018           After Visit Summary Signature Page     I have received my discharge instructions, and my questions have been answered. I have discussed any challenges I see with this plan with the nurse or doctor.    ..........................................................................................................................................  Patient/Patient Representative Signature      ..........................................................................................................................................  Patient Representative Print Name and Relationship to Patient    ..................................................               ................................................  Date                                            Time    ..........................................................................................................................................  Reviewed by Signature/Title    ...................................................              ..............................................  Date                                                            Time

## 2018-02-09 ENCOUNTER — MYC MEDICAL ADVICE (OUTPATIENT)
Dept: FAMILY MEDICINE | Facility: CLINIC | Age: 73
End: 2018-02-09

## 2018-02-09 NOTE — DISCHARGE INSTRUCTIONS
Please follow up closely with your regular physician.     Please return to the ED if your symptoms worsen or if you develop new or concerning symptoms.

## 2018-02-09 NOTE — ED PROVIDER NOTES
"  History     Chief Complaint:  Leg Pain      HPI   Cristy Bailey is a 72 year old female who presents with her  to the Emergency Department for evaluation of leg pain. The patient reports hat she has bilateral leg pain that occurs from her knees to her feet for the last couple of years. She describes the pain as \"pressure\". She states that the pain is so severe that if she tries to lift them, she screams in pain. She has seen specialists and her PCP, with no clear answer. She states that for the past 2 years she has been getting around in a wheelchair secondary to these symptoms.  For the pain, she has been taking ibuprofen. She arrives to the E.D today after calling her PCP, who advised her to come here for her advanced pain symptoms. She denies any recent falls, fevers, redness to the legs, or other complaints at this time.     Allergies:  No Known Drug Allergies     Medications:    Lisinopril     Past Medical History:    Leg weakness   Lyme disease  HTN   Vitamin D deficiency   Morbid obesity     Past Surgical History:    Coloscopy   C section x3  Biopsy of uterus lining     Family History:    Stroke   DM  CAD  Heart disease     Social History:  Marital Status:  Single  The patient was accompanied to the ED by her    Smoking Status: Former Smoker. Quit 1984  Smokeless Tobacco: Never  Alcohol Use: No     Review of Systems   Constitutional: Negative for fever.   Musculoskeletal:        Bilateral leg pain   All other systems reviewed and are negative.    Patient Vitals for the past 24 hrs:   BP Temp Pulse Heart Rate Resp SpO2 Height Weight   02/08/18 2045 104/89 - - - - 94 % - -   02/08/18 2030 (!) 151/95 - - - - 96 % - -   02/08/18 2015 (!) 152/115 - - - - 95 % - -   02/08/18 1930 - - - - - 98 % - -   02/08/18 1915 - - - - - 98 % - -   02/08/18 1900 (!) 140/96 - - - - - - -   02/08/18 1850 (!) 155/107 97.5  F (36.4  C) 87 87 18 100 % 1.676 m (5' 6\") 113.4 kg (250 lb)       Physical Exam   First " "Vitals:  BP: (!) 155/107  Pulse: 87  Heart Rate: 87  Temp: 97.5  F (36.4  C)  Resp: 18  Height: 167.6 cm (5' 6\")  Weight: 113.4 kg (250 lb)  SpO2: 100 %      Physical Exam  Constitutional: The patient is oriented to person, place, and time. Alert and cooperative.  HENT:   Right Ear: External ear normal.   Left Ear: External ear normal.   Nose: Nose normal.   Mouth/Throat: Moist mucous membranes.   Eyes: Conjunctivae, EOM and lids are normal.   Neck: Trachea normal. Normal range of motion. Neck supple.   Cardiovascular: Normal rate, regular rhythm, normal heart sounds, and intact distal pulses.    Pulmonary/Chest: Effort normal and breath sounds equal bilaterally. No crackles or wheezing.   Abdominal: Soft. No tenderness. No rebound and no guarding.   Musculoskeletal: Bilateral lower extremities: No obvious deformity and skin intact. No erythema. No significant edema. No significant tenderness with palpation throughout. Mild diffuse pain with movement in the bilateral lower extremities. Sensation, circulation, and motor function intact.  Neurological: Alert and Oriented. Strength 5/5 in upper and lower extremities bilaterally. Sensation intact to light touch throughout.  Skin: Skin is dry. No rash noted.        Emergency Department Course     Imaging:  Radiology findings were communicated with the patient and family who voiced understanding of the findings.    US Lower Extremity Venous Duplex Bilateral  No evidence of thrombus in the major veins of bilateral  lower extremities.  Report per radiology      Laboratory:  Laboratory findings were communicated with the patient and family who voiced understanding of the findings.  CBC: AWNL. (WBC 6.9, HGB 14.0, )    CMP: Glucose 104 (H) o/w WNL (Creatinine 0.64)     Interventions:  1941: Dilaudid 0.5 mg IM  2054: Oxycodone IR 5 mg P.O      Emergency Department Course:  The patient was sent for US while in the emergency department, results above.   IV was inserted and " blood was drawn for laboratory testing, results above.    Nursing notes and vitals reviewed.  1904: I performed an exam of the patient as documented above.   2046: Patient rechecked and updated.     Findings and plan explained to the Patient and significant other. Patient discharged home with instructions regarding supportive care, medications, and reasons to return. The importance of close follow-up was reviewed. The patient was prescribed Oxycodone IR      Impression & Plan      Medical Decision Making:  Cristy Bailey is a 72 year old female with a history of hypertension, hyperlipidemia, and morbid obesity who presents to the emergency department today for evaluation of bilateral lower extremity pain. Upon presentation in the ED, the patient is non toxic appearing. She is hypertensive but vitals are otherwise within normal limits and stable. On exam, she is well appearing. She is alert, oriented, and neurologic exam is non focal. Cardiopulmonary exam is unremarkable. Abdomen is soft and non tender throughout. On exam of the bilateral lower extremities, there is no obvious deformity and skin is intact. She has no significant tenderness to palpation throughout the lower extremities but does endorse diffuse pain with ROM of the lower extremities. She is neurovascularly intact. The rest of her exam is as mentioned above. Labs were obtained and are as mentioned above. Notably, she does not have a leukocytosis. US of the bilateral lower extremities was obtained and demonstrates no evidence of DVT. The patient notes this bilateral lower extremity pain has been an ongoing issue for a couple of years and states she has been evaluated by her PCP and a specialist without clear etiology of her symptoms. Tonight she notes pain was more severe therefore she decided to present to the ED. Upon my repeat evaluation, she does endorse significant improvement following the above mentioned analgesics. Given the unremarkable US, I  have low suspicion for DVT. The patient has no infectious symptoms to suggest cellulitis or septic joint. The patient has good distal pulses therefore an acute arterial pathology is unlikely. The patient has good bilateral strength in the lower extremity therefore an acute neurologic issue would be unlikely. She has no evidence of compartment syndrome. Overall, the patient is well appearing here with a unremarkable workup. She does note these symptoms have been ongoing for several years. Given that she is well appearing and does endorse significant improvement in symptoms, I do feel she is safe to discharge to home. I did discuss with patient that I recommend close follow up with PCP. She noted understanding. Return precautions given and she was stable/improved at the time of discharge .      Diagnosis:    ICD-10-CM    1. Bilateral leg pain M79.604     M79.605        Disposition:  discharged to home    Discharge Medications:  Discharge Medication List as of 2/8/2018  9:52 PM      START taking these medications    Details   oxyCODONE IR (ROXICODONE) 5 MG tablet Take 1 tablet (5 mg) by mouth every 6 hours as needed for pain, Disp-14 tablet, R-0, Local Print               Kiera Gomes  2/8/2018   [facility] EMERGENCY DEPARTMENT      Scribe Disclosure:  Kiera SANCHEZ, am serving as a scribe at 7:04 PM on 2/8/2018 to document services personally performed by Kiera Villagran MD based on my observations and the provider's statements to me.       Kiera Villagran MD  02/09/18 [id number]

## 2018-02-09 NOTE — ED NOTES
Bed: ED09  Expected date: 2/8/18  Expected time: 6:40 PM  Means of arrival: Ambulance  Comments:  Sheyla Infante

## 2018-02-09 NOTE — ED NOTES
"Patient presents with complaints of bilateral leg pain that starts at the knees and extends to feet. Patient states that she has been having this pain for months now and has been seen by her PCP for it. Patient describes pain as \"pressure\". ABC intact without need for intervention.   "

## 2018-02-13 ENCOUNTER — OFFICE VISIT (OUTPATIENT)
Dept: FAMILY MEDICINE | Facility: CLINIC | Age: 73
End: 2018-02-13
Payer: COMMERCIAL

## 2018-02-13 DIAGNOSIS — M79.605 BILATERAL LEG PAIN: ICD-10-CM

## 2018-02-13 DIAGNOSIS — R29.898 WEAKNESS OF BOTH LOWER EXTREMITIES: Primary | ICD-10-CM

## 2018-02-13 DIAGNOSIS — E66.01 MORBID OBESITY, UNSPECIFIED OBESITY TYPE (H): ICD-10-CM

## 2018-02-13 DIAGNOSIS — M79.604 BILATERAL LEG PAIN: ICD-10-CM

## 2018-02-13 DIAGNOSIS — I10 HTN, GOAL BELOW 140/90: ICD-10-CM

## 2018-02-13 PROCEDURE — 99214 OFFICE O/P EST MOD 30 MIN: CPT | Performed by: PHYSICIAN ASSISTANT

## 2018-02-13 RX ORDER — GABAPENTIN 300 MG/1
CAPSULE ORAL
Qty: 90 CAPSULE | Refills: 0 | Status: SHIPPED | OUTPATIENT
Start: 2018-02-13 | End: 2018-03-27

## 2018-02-13 RX ORDER — HYDROCODONE BITARTRATE AND ACETAMINOPHEN 5; 325 MG/1; MG/1
1 TABLET ORAL AT BEDTIME
Qty: 20 TABLET | Refills: 0 | Status: SHIPPED | OUTPATIENT
Start: 2018-02-13 | End: 2018-03-14

## 2018-02-13 RX ORDER — LISINOPRIL 5 MG/1
5 TABLET ORAL DAILY
Qty: 90 TABLET | Refills: 0 | Status: SHIPPED | OUTPATIENT
Start: 2018-02-13 | End: 2018-02-13

## 2018-02-13 RX ORDER — LISINOPRIL 5 MG/1
5 TABLET ORAL DAILY
Qty: 90 TABLET | Refills: 0 | Status: SHIPPED | OUTPATIENT
Start: 2018-02-13 | End: 2018-03-27

## 2018-02-13 NOTE — MR AVS SNAPSHOT
After Visit Summary   2/13/2018    Crisyt Bailey    MRN: [id number]           Patient Information     Date Of Birth          1945        Visit Information        Provider Department      2/13/2018 6:20 PM Manoj Daley PA-C [facility]        Today's Diagnoses     Weakness of both lower extremities    -  1    Bilateral leg pain        HTN, goal below 140/90          Care Instructions    Consult at [facility]: [facility] [phone]   [email or web address]  [facility]: [facility] [phone]   [email or web address]          Follow-ups after your visit        Additional Services     CARE COORDINATION REFERRAL       Services are provided by a Care Coordinator for people with complex needs such as: medical, social, or financial troubles.  The Care Coordinator works with the patient and their Primary Care Provider to determine health goals, obtain resources, achieve outcomes, and develop care plans that help coordinate the patient's care.     Reason for Referral: Complex Medical Concerns/Education: New diagnosis - increasing bilateral leg pain and weakness; wheel chair bound; difficulty making appointments and Resources for Transportation    Additional pertinent details:  Also setting up home care ot/pt    Clinical Staff have discussed the Care Coordination Referral with the patient and/or caregiver: yes            HOME CARE NURSING REFERRAL       **Order classes of: [facility], [facility] and [facility] will route to the Home Care and Hospice Referral Pool.  Home Care or Hospice will then contact the patient to schedule their appointment.**    If you do not hear from Home Care and Hospice, or you would like to call to schedule, please call the referring place of service that your provider has listed  below.  ______________________________________________________________________    Your provider has referred you to: [facility]: [facility] [phone]   [email or web address]    Extended Emergency Contact Information  Primary Emergency Contact: Leo Akbar   [facility]  Home Phone: [phone]  Work Phone: none  Mobile Phone: [phone]  Relation: Friend  Secondary Emergency Contact: Marquita Rodriguez   [facility]  Home Phone: [phone]  Relation: Sister    Patient Anticipated Discharge Date: unknown   RN, PT, HHA to begin 24 - 48 hours after discharge.  PLEASE EVALUATE AND TREAT (Evaluation timeline is 24 - 48 hrs. Please call if there is need for a variance to this timeline).    REASON FOR REFERRAL: Assessment & Treatment: PT/OT    ADDITIONAL SERVICES NEEDED: Care coordination    OTHER PERTINENT INFORMATION: Patient was last seen by provider on 02/13/18 for increasing bilateral leg pain, weakness.    Current Outpatient Prescriptions:  lisinopril (PRINIVIL/ZESTRIL) 5 MG tablet, Take 1 tablet (5 mg) by mouth daily, Disp: 90 tablet, Rfl: 0  oxyCODONE IR (ROXICODONE) 5 MG tablet, Take 1 tablet (5 mg) by mouth every 6 hours as needed for pain, Disp: 14 tablet, Rfl: 0  clotrimazole (LOTRIMIN) 1 % cream, APPLY TO AFFECTED AREA TOPICALLY 2 TIMES A DAY. , Disp: 15 g, Rfl: 0  order for DME, Equipment being ordered: Compression stockings 20-30mmHg, Disp: 2 Package, Rfl: 0  lisinopril (PRINIVIL/ZESTRIL) 5 MG tablet, Take 1 tablet (5 mg) by mouth daily ONE DAILY for HTN, Disp: 30 tablet, Rfl: 2  [DISCONTINUED] lisinopril (PRINIVIL/ZESTRIL) 5 MG tablet, Take 1 tablet (5 mg) by mouth daily, Disp: 90 tablet, Rfl: 0  [DISCONTINUED] lisinopril (PRINIVIL/ZESTRIL) 5 MG tablet, Take 1 tablet (5 mg) by mouth daily, Disp: 90 tablet, Rfl: 1      Patient Active Problem List:     HTN (hypertension)     Hyperlipidemia LDL goal <160     Abnormal glucose     Obesity      Advanced directives, counseling/discussion     Leg weakness, bilateral     CRP elevated     Vitamin D deficiency     Health Care Home     HTN, goal below 140/90     Constipation     Leg weakness     Constipation     Myalgia and myositis     Morbid obesity (H)      Documentation of Face to Face and Certification for Home Health Services    I certify that patient, Cristy Bailey is under my care and that I, or a Nurse Practitioner or Physician's Assistant working with me, had a face-to-face encounter that meets the physician face-to-face encounter requirements with this patient on: 2/13/2018.    This encounter with the patient was in whole, or in part, for the following medical condition, which is the primary reason for Home Health Care: increasing bilateral leg pain and weakness with unknown etiology.    I certify that, based on my findings, the following services are medically necessary Home Health Services: Occupational Therapy and Physical Therapy    My clinical findings support the need for the above services because: Occupational Therapy Services are needed to assess and treat cognitive ability and address ADL safety due to impairment for increasing bilateral leg pain and weakness with unknown etiology and Physical Therapy Services are needed to assess and treat the following functional impairments: increasing bilateral leg pain and weakness with unknown etiology.    Further, I certify that my clinical findings support that this patient is homebound (i.e. absences from home require considerable and taxing effort and are for medical reasons or Baptism services or infrequently or of short duration when for other reasons) because: Requires assistance of another person or specialized equipment to access medical services because patient: Has prohibitive pain during ambulation..    Based on the above findings, I certify that this patient is confined to the home and needs intermittent skilled nursing care, physical  therapy and/or speech therapy.  The patient is under my care, and I have initiated the establishment of the plan of care.  This patient will be followed by a physician who will periodically review the plan of care.    Physician/Provider to provide follow up care: Az Mary    Responsible YI certified Physician at time of discharge: Kulwant Daley PA-C/Jeana Duke MD    Please be aware that coverage of these services is subject to the terms and limitations of your health insurance plan.  Call member services at your health plan with any benefit or coverage questions.                  Who to contact     If you have questions or need follow up information about today's clinic visit or your schedule please contact [facility] directly at [phone].  Normal or non-critical lab and imaging results will be communicated to you by Outerstuffhart, letter or phone within 4 business days after the clinic has received the results. If you do not hear from us within 7 days, please contact the clinic through Outerstuffhart or phone. If you have a critical or abnormal lab result, we will notify you by phone as soon as possible.  Submit refill requests through Bon'App or call your pharmacy and they will forward the refill request to us. Please allow 3 business days for your refill to be completed.          Additional Information About Your Visit        OuterstuffharTriparazzi Information     Bon'App gives you secure access to your electronic health record. If you see a primary care provider, you can also send messages to your care team and make appointments. If you have questions, please call your primary care clinic.  If you do not have a primary care provider, please call [phone] and they will assist you.        Care EveryWhere ID     This is your Care EveryWhere ID. This could be used by other organizations to access your [place] medical records  [id number]        Your Vitals Were     Pulse Temperature Respirations  "Height Pulse Oximetry       79 98.7  F (37.1  C) (Tympanic) 17 5' 6\" (1.676 m) 97%        Blood Pressure from Last 3 Encounters:   02/13/18 150/84   02/08/18 104/89   09/22/17 136/82    Weight from Last 3 Encounters:   02/08/18 250 lb (113.4 kg)   09/22/17 258 lb 8 oz (117.3 kg)   08/25/17 257 lb (116.6 kg)              We Performed the Following     CARE COORDINATION REFERRAL     HOME CARE NURSING REFERRAL          Today's Medication Changes          These changes are accurate as of 2/13/18  7:21 PM.  If you have any questions, ask your nurse or doctor.               Start taking these medicines.        Dose/Directions    gabapentin 300 MG capsule   Commonly known as:  NEURONTIN   Used for:  Weakness of both lower extremities, Bilateral leg pain   Started by:  Manoj Daley PA-C        Take 1 tablet (300 mg) every night for 1-3 days, then 1 tablet twice daily for 1-3 days, then 1 tablet three times daily   Quantity:  90 capsule   Refills:  0       HYDROcodone-acetaminophen 5-325 MG per tablet   Commonly known as:  NORCO   Used for:  Bilateral leg pain   Started by:  Manoj Daley PA-C        Dose:  1 tablet   Take 1 tablet by mouth At Bedtime   Quantity:  20 tablet   Refills:  0         These medicines have changed or have updated prescriptions.        Dose/Directions    * lisinopril 5 MG tablet   Commonly known as:  PRINIVIL/ZESTRIL   This may have changed:  Another medication with the same name was added. Make sure you understand how and when to take each.   Used for:  HTN, goal below 140/90   Changed by:  Manoj Daley PA-C        Dose:  5 mg   Take 1 tablet (5 mg) by mouth daily ONE DAILY for HTN   Quantity:  30 tablet   Refills:  2       * lisinopril 5 MG tablet   Commonly known as:  PRINIVIL/ZESTRIL   This may have changed:  You were already taking a medication with the same name, and this prescription was added. Make sure you understand how and when to take each.   Used for:  HTN, " goal below 140/90   Changed by:  Manoj Daley PA-C        Dose:  5 mg   Take 1 tablet (5 mg) by mouth daily   Quantity:  90 tablet   Refills:  0       * Notice:  This list has 2 medication(s) that are the same as other medications prescribed for you. Read the directions carefully, and ask your doctor or other care provider to review them with you.         Where to get your medicines      These medications were sent to [id number] - [place], [place] - [address]  [address] [facility] [address]    Hours:  9Am-9Pm (M-F) 9Am-6Pm (S&S) Phone:  [phone]     gabapentin 300 MG capsule    lisinopril 5 MG tablet         Some of these will need a paper prescription and others can be bought over the counter.  Ask your nurse if you have questions.     Bring a paper prescription for each of these medications     HYDROcodone-acetaminophen 5-325 MG per tablet                Primary Care Provider Office Phone # Fax #    [facility] [place] [phone] [address] [address] [facility] [address]        Equal Access to Services     [facility]: Hadii aad ku hadasho Soomaali, waaxda luqadaha, qaybta kaalmada adeegyada, waxay nando davalos . So [facility] [phone].    ATENCIÓN: Si habla español, tiene a pompa disposición servicios gratuitos de asistencia lingüística. [facility] [phone].    We comply with applicable federal civil rights laws and [place] laws. We do not discriminate on the basis of race, color, national origin, age, disability, sex, sexual orientation, or gender identity.            Thank you!     Thank you for choosing [facility]  for your care. Our goal is always to provide you with excellent care. Hearing back from our patients is one way we can continue to improve our services. Please take a few minutes to complete the written survey that you may receive in the mail after your visit with us. Thank you!             Your  Updated Medication List - Protect others around you: Learn how to safely use, store and throw away your medicines at www.disposemymeds.org.          This list is accurate as of 2/13/18  7:21 PM.  Always use your most recent med list.                   Brand Name Dispense Instructions for use Diagnosis    clotrimazole 1 % cream    LOTRIMIN    15 g    APPLY TO AFFECTED AREA TOPICALLY 2 TIMES A DAY.    Rash       gabapentin 300 MG capsule    NEURONTIN    90 capsule    Take 1 tablet (300 mg) every night for 1-3 days, then 1 tablet twice daily for 1-3 days, then 1 tablet three times daily    Weakness of both lower extremities, Bilateral leg pain       HYDROcodone-acetaminophen 5-325 MG per tablet    NORCO    20 tablet    Take 1 tablet by mouth At Bedtime    Bilateral leg pain       * lisinopril 5 MG tablet    PRINIVIL/ZESTRIL    30 tablet    Take 1 tablet (5 mg) by mouth daily ONE DAILY for HTN    HTN, goal below 140/90       * lisinopril 5 MG tablet    PRINIVIL/ZESTRIL    90 tablet    Take 1 tablet (5 mg) by mouth daily    HTN, goal below 140/90       order for DME     2 Package    Equipment being ordered: Compression stockings 20-30mmHg    Lower extremity edema       oxyCODONE IR 5 MG tablet    ROXICODONE    14 tablet    Take 1 tablet (5 mg) by mouth every 6 hours as needed for pain        * Notice:  This list has 2 medication(s) that are the same as other medications prescribed for you. Read the directions carefully, and ask your doctor or other care provider to review them with you.

## 2018-02-13 NOTE — PROGRESS NOTES
"  SUBJECTIVE:   Cristy Bailey is a 72 year old female who presents to clinic today for the following health issues:    ED/UC Followup:    Facility:  [facility]   Date of visit: 2/8/18  Reason for visit: Leg pain   Current Status: Patient states that she is not doing any better since being seen.         -Patient presents to follow up on bilateral leg pain/weakness  -She notes the symptoms of weakness have continued since I last saw her (chronic since 2012) but that since last seen now there is pain  -She went to the ED 2/8, had ultrasound which ruled out DVT   -sent home with narcotics   -these have helped pain some but it is \"so bad she cannot get out of the house now without it\"  -Pain feels like \"pressure from the inside out\"   -will occasionally get shooting pain in the legs as well, less often,  -Pain is throughout the day, 24 hours, 10/10  -Most of the pain is from the knees and downward  -some increase to the upper legs, hips  -the pain is progressive; she cannot and has not been able to bare weight since I last saw, onset her many years ago  -she is hopeless regarding her situation, feeling overwhelmed   BUT she denies any depressive thinking, anxiety   All stress is 2/2 her pain    Problem list and histories reviewed & adjusted, as indicated.  Additional history: as documented    Patient Active Problem List   Diagnosis     HTN (hypertension)     Hyperlipidemia LDL goal <160     Abnormal glucose     Obesity     Advanced directives, counseling/discussion     Leg weakness, bilateral     CRP elevated     Vitamin D deficiency     Health Care Home     HTN, goal below 140/90     Constipation     Leg weakness     Constipation     Myalgia and myositis     Morbid obesity (H)     Past Surgical History:   Procedure Laterality Date     BIOPSY OF UTERUS LINING  3/2010    negative.      C C-SEC ONLY,PREV C-SEC      c-sec x 3      COLONOSCOPY  2/21/10    benign findings. advised 10 yr f/u.        Social History " "  Substance Use Topics     Smoking status: Former Smoker     Quit date: 1984     Smokeless tobacco: Never Used     Alcohol use No     Family History   Problem Relation Age of Onset     CEREBROVASCULAR DISEASE Father      -stroke at age 80     Family History Negative Mother       Diedn her 80's of unknown causes.  Pt otherwise unaware of her health hx.      Unknown/Adopted Mother      DIABETES Brother      (Christian)  of DM complications at age 50.     Alcohol/Drug Brother      Christian     C.A.D. Brother      Christian.      HEART DISEASE Brother      Christian--MI age 50.     Family History Negative Brother      Family History Negative Brother      Family History Negative Sister      Family History Negative Sister      Family History Negative Son      Family History Negative Daughter      Family History Negative Daughter            Reviewed and updated as needed this visit by clinical staff       Reviewed and updated as needed this visit by Provider         ROS:  Constitutional, HEENT, cardiovascular, pulmonary, gi and gu systems are negative, except as otherwise noted.    OBJECTIVE:     /72  Pulse 79  Temp 98.7  F (37.1  C) (Tympanic)  Resp 17  Ht 5' 6\" (1.676 m)  SpO2 97%  There is no height or weight on file to calculate BMI.  GENERAL: Patient distressed in office, tearful; stable  RESP: lungs clear to auscultation - no rales, rhonchi or wheezes  CV: regular rates and rhythm  MS: limited exam due to WC bound. She is able to lift herself with her arms and onto her legs but cannot maintain weight bearing. She is able to move the legs while seated in the chair - ie hip flexion, knee extension/flexion. She is wearing compression stockings today and there is limited edema  PSYCH: mentation appears normal, tearful and anxious    Diagnostic Test Results:  Xray - Lumbar: unable to perform today in clinic as patient is unable to stand for this. Consider hospital for imaging    ASSESSMENT/PLAN:   1. " "Weakness of both lower extremities  2. Bilateral leg pain  71yo female with insidious, idiopathic onset of bilateral leg weakness (as early as 2012) and now more recently intense pain. She was quite animated in clinic today, fearful/worried about what to do about her situation, increasing pain. I am not under the impression she is med seeking today. I had met her first this past fall and suggested neurology referral but she did not make it to this. She has a hx of treated Lymes disease and elevated CRP but other work up has been negative. She reports seeing a neurologist previously but I have no evidence of this. I do see an initial consult with rheumatology but we do not have a follow up report. She tells me \"everything was normal\". They had suggested EMG in that note in addition to further labs. Unable to get lumbar xray today given her inability to stand. We'll need to consider getting at the hospital. Before that, I'd like her to get in with neurology to begin a formal evaluation. To help with this i've referred to care coordination who may be able to help with transportation and other assistance with scheduling, etc. Additionally, felix asked home care to go out and assess home safety, and consider any benefit for ot/pt. Likely we'd need a diagnosis first? Finally, reviewed that chronic narcotics are not a solution. I did fill a short term amount to help with sleep or breakthrough. Trial of gabapentin to help otherwise. She will need close follow up.  - XR Lumbar Spine 2/3 Views; Future CANCELLED  - HOME CARE NURSING REFERRAL  - CARE COORDINATION REFERRAL  - gabapentin (NEURONTIN) 300 MG capsule; Take 1 tablet (300 mg) every night for 1-3 days, then 1 tablet twice daily for 1-3 days, then 1 tablet three times daily  Dispense: 90 capsule; Refill: 0  - HYDROcodone-acetaminophen (NORCO) 5-325 MG per tablet; Take 1 tablet by mouth At Bedtime  Dispense: 20 tablet; Refill: 0    3. Morbid obesity, unspecified obesity " type (H)  WC status obviously contributory. Have asked her to monitor food intake.     4. HTN, goal below 140/90  Surprisingly this did drop today after she calmed some. It was elevated during her ED visit until actually much lower when they last took the bp. Previously she's had excellent control on just 5mg and has been checking at home with good control. We'll plan to stay at this but hopefully this can be checked by homecare as well.   - lisinopril (PRINIVIL/ZESTRIL) 5 MG tablet; Take 1 tablet (5 mg) by mouth daily  Dispense: 90 tablet; Refill: 0    >50 % of the 25 minutes was spent in face to face counselling or coordination of care for the above issues.      Manoj Daley PA-C  [facility]

## 2018-02-13 NOTE — NURSING NOTE
"Chief Complaint   Patient presents with     Hospital F/U     Recheck Medication       Initial /84 (BP Location: Right arm, Patient Position: Chair, Cuff Size: Adult Large)  Pulse 79  Temp 98.7  F (37.1  C) (Tympanic)  Resp 17  Ht 5' 6\" (1.676 m)  SpO2 97% Estimated body mass index is 40.35 kg/(m^2) as calculated from the following:    Height as of 2/8/18: 5' 6\" (1.676 m).    Weight as of 2/8/18: 250 lb (113.4 kg).  Medication Reconciliation: complete   Kiera Appiah MA     Patient no longer gets mammograms done.   "

## 2018-02-14 ENCOUNTER — CARE COORDINATION (OUTPATIENT)
Dept: CARE COORDINATION | Facility: CLINIC | Age: 73
End: 2018-02-14

## 2018-02-14 ENCOUNTER — MYC MEDICAL ADVICE (OUTPATIENT)
Dept: FAMILY MEDICINE | Facility: CLINIC | Age: 73
End: 2018-02-14

## 2018-02-14 VITALS
OXYGEN SATURATION: 97 % | DIASTOLIC BLOOD PRESSURE: 72 MMHG | TEMPERATURE: 98.7 F | RESPIRATION RATE: 17 BRPM | SYSTOLIC BLOOD PRESSURE: 134 MMHG | HEART RATE: 79 BPM | HEIGHT: 66 IN

## 2018-02-14 DIAGNOSIS — I10 HTN, GOAL BELOW 140/90: ICD-10-CM

## 2018-02-14 RX ORDER — LISINOPRIL 5 MG/1
5 TABLET ORAL DAILY
Qty: 90 TABLET | Refills: 0 | Status: SHIPPED | OUTPATIENT
Start: 2018-02-14 | End: 2018-03-27

## 2018-02-14 NOTE — PROGRESS NOTES
Clinic Care Coordination Contact  OUTREACH    Referral Information:  Referral Source: PCP  Reason for Contact: Increasing bilateral leg pain and weakness. Using wheelchair and has difficulty getting in and out of car to see specialists.   PCP also sent referral to  Home Care.   Call to patient and reviewed purpose of call and she was willing to discuss her needs.   Care Conference: No     Universal Utilization:   ED Visits in last year: 1  Hospital visits in last year: 0  Last PCP appointment: 02/13/18  Missed Appointments: 0  Concerns: appropriate utilization    Clinical Concerns:  Current Medical Concerns: Had gone to ED due to significant pain in her legs and was given Oxycodone.  Had post ED visit with KRISH Francois yesterday and they discussed her concerns about pain. She was very upset during the OV.  PA recommended she follow up with specialists to determine cause of pain and she agrees with that.  She got Norco and Gabapentin from PA and she did not take the Norco, but did start the Gabapentin and is helped and she is in less pain today.  She does not want to take Norco and is not planning to take it.  She is looking forward to home care and therapy starting so she can work to regain some leg strength.   Current Behavioral Concerns: At appointment yesterday, she was so upset that PA considered referring to psychiatry. Today she is calm and feels better.  She felt badly about how she talked with PA and wants to apologize.      Education Provided to patient: Reviewed how pain impacts mental health. Discussed how home care works and how care coordination works.     Clinical Pathway Name: None    Medication Management:  She is independent with her medications and has no concerns about medication management.     Functional Status:  Mobility Status: Independent w/Device  Equipment Currently Used at Home: wheelchair, manual, grab bar, raised toilet seat.  Transportation: Her partner/spouse drives.  She is unable to  bend her legs independently and her partner had to bend legs to get into car and it was painful. She has not gone to any specialists because of this problem. She may be able to do appointments now that the pain is lessened.      Psychosocial:  Current living arrangement:: I live in a private home with spouse  Financial/Insurance: No concerns. [employer].     Resources and Interventions:  Current Resources:Home Care     Advanced Care Plans/Directives on file:: No     Barriers: Significant pain with unknown causation.  Has not had neurological evaluation yet. Has increased leg weakness due to pain and using wc more often.    Strengths: Has some relief from overwhelming pain with new prescription Gabapentin.  Has partner who can assist.  Will work with [facility] to get PT tos regain some leg strength.  Is able to take sponge baths. Can propel herself in her wc.   Patient/Caregiver understanding: She will call if needs arise, but will work with home care. When discharged from home care, CC will call to assess needs at that time.   Frequency of Care Coordination:    Upcoming appointment:  (not set up)     Plan:Sending access letter. Asked care navigator to follow while open to home care. Routing to PCP.    Francisca Hernandez,   [facility]  [email or web address]  [phone]

## 2018-02-14 NOTE — LETTER
[facility] CARE COORDINATION  [address] [place] [address]    February 14, 2018      Cristy Bailey  [address]   [place] [address]      Dear Cristy,    I am a clinic care coordinator who works with Manoj Daley PA-C at [phone]. I wanted to thank you for spending the time to talk with me.  I wanted to introduce myself and provide you with my contact information so that you can call me with questions or concerns about your health care. Below is a description of clinic care coordination and how I can further assist you.     The clinic care coordinator is a registered nurse and/or  who understand the health care system. The goal of clinic care coordination is to help you manage your health and improve access to the [facility] system in the most efficient manner. The registered nurse can assist you in meeting your health care goals by providing education, coordinating services, and strengthening the communication among your providers. The  can assist you with financial, behavioral, psychosocial, chemical dependency, counseling, and/or psychiatric resources.    Please feel free to contact me at [phone], with any questions or concerns. We at [facility] are focused on providing you with the highest-quality healthcare experience possible and that all starts with you.     Sincerely,     Francisca Hernandez    Enclosed: I have enclosed a copy of a 24 Hour Access Plan. This has helpful phone numbers for you to call when needed. Please keep this in an easy to access place to use as needed.

## 2018-02-14 NOTE — LETTER
Health Care Home - Access Care Plan    About Me  Patient Name:  Cristy Bailey    YOB: 1945  Age:                             72 year old   Az MRN:            [id number] Telephone Information:     Home Phone [phone]   Mobile [phone]       Address:    [address]   [place] [address] Email address:  [email or web address]      Emergency Contact(s)  Name Relationship Lgl Grd Work Phone Home Phone Mobile Phone   1. VILLEGAS, BOBBI Friend  none [phone]   2. KYARA GARCÍA Sister   [phone]              Health Maintenance: Routine Health maintenance Reviewed: Due/Overdue   Health Maintenance Due   Topic Date Due     HEPATITIS C SCREENING  05/30/1965     PNEUMOCOCCAL (1 of 2 - PCV13) 05/30/2012     MAMMO SCREEN Q2 YR (SYSTEM ASSIGNED)  08/01/2013     ADVANCE DIRECTIVE PLANNING Q5 YRS  08/02/2016       My Access Plan  Medical Emergency 911   Questions or concerns during clinic hours Primary Clinic Line, I will call the clinic directly: Primary Clinic: [facility] [phone]   24 Hour Appointment Line [phone] or  [phone] ([phone]) (toll free)   24 Hour Nurse Line [phone] (toll free)   Questions or concerns outside clinic hours 24 Hour Appointment Line, I will call the after-hours on-call line:   [facility] [phone] or [phone]) (toll-free)   Preferred Urgent Care Preferred Urgent Care: [facility] [phone]   Preferred Hospital Preferred Hospital: [facility]  [phone]   Preferred Pharmacy [id number] - [place], [place] - [address]     Behavioral Health Crisis Line The National Suicide Prevention Lifeline at 1-701.397.6520 or 911     My Care Team Members  Patient Care Team       Relationship Specialty Notifications Start End    Manoj Daley PA-C PCP - General Physician Assistant  2/14/18     Phone:  [phone] Fax: [phone]         [address] [address]    Francisca Hernandez BSW    2/14/18     Comment:  [phone]        My Medical and Care Information  Problem List   Patient Active Problem List   Diagnosis     HTN (hypertension)     Hyperlipidemia LDL goal <160     Abnormal glucose     Obesity     Advanced directives, counseling/discussion     Leg weakness, bilateral     CRP elevated     Vitamin D deficiency     Health Care Home     HTN, goal below 140/90     Constipation     Leg weakness     Constipation     Myalgia and myositis     Morbid obesity (H)      Current Medications and Allergies:    Current Outpatient Prescriptions   Medication     lisinopril (PRINIVIL/ZESTRIL) 5 MG tablet     lisinopril (PRINIVIL/ZESTRIL) 5 MG tablet     gabapentin (NEURONTIN) 300 MG capsule     HYDROcodone-acetaminophen (NORCO) 5-325 MG per tablet     oxyCODONE IR (ROXICODONE) 5 MG tablet     clotrimazole (LOTRIMIN) 1 % cream     order for DME     [DISCONTINUED] lisinopril (PRINIVIL/ZESTRIL) 5 MG tablet     No current facility-administered medications for this visit.

## 2018-02-14 NOTE — PATIENT INSTRUCTIONS
Consult at [facility]: [facility] [phone]   [email or web address]  N: [facility] [place] [phone]   [email or web address]

## 2018-02-15 ENCOUNTER — CARE COORDINATION (OUTPATIENT)
Dept: CARE COORDINATION | Facility: CLINIC | Age: 73
End: 2018-02-15

## 2018-02-15 NOTE — PROGRESS NOTES
Clinic Care Coordination Contact  Care Coordination Communication    Referral Source: PCP      Home Care Contact:              Home Care Agency: [facility]              Contact name () and phone number: No  assigned yet              Care Coordination contacted home care: No              Anticipated start of care date: 2/13/18        Plan: RN/SW Care Coordinator will await notification from home care staff informing RN/SW Care Coordinator of patients discharge plans/needs. RN/SW Care Coordinator will review chart and outreach to home care every 4 weeks and as needed.      FAIZA Dodd  Care Navigator  [facility]  [phone]

## 2018-02-16 ENCOUNTER — DOCUMENTATION ONLY (OUTPATIENT)
Dept: FAMILY MEDICINE | Facility: CLINIC | Age: 73
End: 2018-02-16

## 2018-02-16 NOTE — PROGRESS NOTES
Char from [facility] called with request for orders.    Verbal ok given for P T 1 x wk for 1 wk, then 2 x wk for 3 wks. OT eval and tx. SWS visit x 1 for community resources.    Routed to pcp and Dr. Leilani Coon RN

## 2018-02-19 ENCOUNTER — MYC MEDICAL ADVICE (OUTPATIENT)
Dept: FAMILY MEDICINE | Facility: CLINIC | Age: 73
End: 2018-02-19

## 2018-02-20 NOTE — TELEPHONE ENCOUNTER
Is patient up to 3 times daily of the gabapentin? I am doubtful it is causing these symptoms. Perhaps she could go back to the dose she was on when she had much less pain after seeing me. She could consider taking one of the pain medications during the day as an augmentation. It is likely a good idea to schedule with neurology as I had advised - they are often difficult to get into and the sooner we get work up hopefully can figure out etiology of the symptoms

## 2018-02-21 NOTE — PROGRESS NOTES
Clinic Care Coordination Contact  Care Team Conversations  Email from Marilu S[facility] TONY who visited patient today and sent her note:  Here is a copy of my visit note to Cristy Bailey from today. FYI. She told me Francisca called and left a message a while ago, offering assist. Nice to know that she got your message and understood!  Marilu     Pt sitting in w/c upon arrival. Appeared well groomed. Friendly and receptive.     PRIMARY LANGUAGE      English     HOMEBOUND STATUS  Pt is homebound due to pain in leag and weakness.     GOALS/WHAT MATTERS/FEARS/CONCERNS/QUALITY OF LIFE  To be able to walk, stand up anyway. Mostly its to get the pain gone.     PATIENT STRENGTHS  Enjoys sewing. Currently making a quilt. Also enjoys painting ceramics.     MEDICAL HISTORY/CURRENT ISSUES/HAS PT BEEN IN HOSPITAL OR TCU RECENTLY   ED 2/8/18 for B leg pain with DVT ruled out. Pt has had chronic pain in legs for 2 years with no dx. H/O HTN, obesity, lymes disease.      COGNITION   alert and oriented to person, place, and partially to time ie knew month and year.     FUNCTIONAL STATUS    Pt unable to ambulate x 6 months to a year due to leg pain. Independent with toileting. Gives self sponge bath and washes hair in kitchen sink. Denied falls. No new trips to ED.     LIVING SITUATION   Lives in mobile home which appeared neat and clean.     SUPPORT SYSTEM   Significant other, Leo, lives with Pt. He does grocery shopping, puts on compression socks, and accompanies Pt to appts. Pt has three children, daug in [place], son in [place], and daug in [place]. Pt is in contact with youngest daug, Kelley. Saw her for first time in 23 yrs last month. Pt stated they are best buds. No contact with other daug and limited contact with son. Denied Gnosticism connection.     CAREGIVER CONCERNS/ LIMITATIONS/ CONFLICTS   Pt stated she asked Leo how he feels about caregiving. She indicated he denied feeling overwhelmed, but feels bad when Pt cries from the  pain.     FINANCIAL ASSESSMENT   Pt denied having any assets. Income SS 1800.00, Pension 250.85     EMOTIONAL STATUS/ CURRENT SOURCES OF STRESS   Tearful and easily upset. Pt stated mood is good most of the time, except when she is in pain. Denied depression and anxiety. Stated she only gets scared when she hurts so bad.     ADVANCE CARE PLANNING   No ACP documents on file in Epic.     INTERVENTIONS     SUPPORT/MENTAL HEALTH   Listened and empathized as Pt spoke about chronic pain, inability to walk, and associated functional decline. Explored referral for counseling. Pt denied need or interest.     INTEGRATIVE HEALING THERAPIES IHT MENU/AROMATHERAPY/BREATHING/ACUPRESURE/OTHER  Integrative healing therapy was reviewed, importance of selfcare discussed.  Pt stated she is totally against essential oils as she feels it is breathing chemicals.     ALTERNATIVE LIVING OPTIONS   Pt stated, I want to stay right where I am.     FINANCIAL NEEDS     No immediate financial needs, but worries about anticipated medical bills from recent trip to ED. LISW encouraged her to wait and see what bills she receives before she gets too worried. Educated re possibility of applying for MA if she sustains large medical bills. Explained she would have a spend down, but bills above and beyond spend down would be covered.     AC  Pts income and assets appear to meet eligibility criteria for [facility]. Educated re AC, explaining services that are available under program. Informed Pt that he/she may have a co pay for services, based on income and assets. Pt declined referral, stating she doesnt want help at this time as she wants to do all that she can for herself.     FOOD RESOURCES   Denied interest in meal delivery, stating that she loves to cook. Educated briefly re Fare for All and Pt denied interest in that program as well.     TRANSPORTATION   Explored transport needs and options. Pt stated she had not been able to get to [facility] for a  long time due to pain. She explained she could get to car in w/c, but then could not get in car even with [facility] help. ASHLEE briefly educated re [employer] and offered assist with rebecca. Pt not sure that she will need it, but agreed to proceed with rebecca so that she has a back up plan.  INSURANCE ISSUES  Went on [employer] web site to get an idea of ambulance charges, trying to put Pts mind at ease. Ended up setting up an online acct with [email or web address]. Then looked at recent transactions with Pt. Educated re difference between billed amount and allowed amount. Pt relieved to see that her part on the ambulance bill is 200.00, much less than the couple of thousand she was anticipating. Pt excited to be able to go on [employer] web site herself, showing writer I pad which her son sent her.  LIFELINE OR EMERGENCY RESPONSE SYSTEMS    Recommended LL install in light of fall risk and the fact that she spends a good amount of time alone. Pt considering as she knows she cant always get to a phone and worries about what she would do.. Explained service could be covered under AC, but that a person will not typically be open to AC for just one service.  SOCIALIZATION    Pt stated she has never socialized.   ADVANCE CARE PLANNING    Explored ACP needs. Educated re HC Directive and POLST, explaining the difference. Pt considering completion, though expressed confidence that her children would be in agreement about any medical decisions that need to be made. Pointed out the HC Directive is an opportunity for her to let her children know her wishes/treatment preferences. Pt considering. Left HC Directive for her review.  PLAN OF CARE   FU visit scheduled 2/26 to complete [employer] rebecca and HC Directive if Pt decides to proceed with that. Report given to Team, including A Care Coordinator Francisca Hernandez, JOSE GUADALUPE [phone].     ASHLEE Mccord  [facility]  Francisca Hernandez,   [facility]  [email or web address]  [phone]

## 2018-02-26 ENCOUNTER — TELEPHONE (OUTPATIENT)
Dept: FAMILY MEDICINE | Facility: CLINIC | Age: 73
End: 2018-02-26

## 2018-02-26 NOTE — TELEPHONE ENCOUNTER
Received a call from Marilu Sesay,  [phone].    She is looking for orders for f/u social work visit - working on [employer] and advanced care planning.  Verbal order given per standing orders.      She is c/o of significant pain in her legs.  At the night the pain is at a 9/10.  She has hydrocodone, but does not want to take it.  She is taking gabapentin.  She has been afraid to take it because she told the  she was told the hydrocodone will not be refilled.  She has only taken 5.  She said night time is the worst.  She has a neurology appt on 3/20/18.  Advised then she could try taking one at night and see how it goes and if it is still really bad to call back.  Looks like it was ordered to take at bedtime.

## 2018-03-02 NOTE — PROGRESS NOTES
Clinic Care Coordination Contact    Situation: Patient chart reviewed by care coordinator.    Background: Patient is active with [facility]    Assessment: Per [facility], patient is still active with home care    Plan/Recommendations: This writer will check [facility] in 1-2 weeks to check home care status    FAIZA Dodd  Care Navigator  [facility]  [phone]

## 2018-03-14 ENCOUNTER — MYC REFILL (OUTPATIENT)
Dept: FAMILY MEDICINE | Facility: CLINIC | Age: 73
End: 2018-03-14

## 2018-03-14 DIAGNOSIS — M79.605 BILATERAL LEG PAIN: ICD-10-CM

## 2018-03-14 DIAGNOSIS — M79.604 BILATERAL LEG PAIN: ICD-10-CM

## 2018-03-14 NOTE — TELEPHONE ENCOUNTER
Message from Gobymaximilianot:  Original authorizing provider: Manoj Daley PA-C    rCisty Bailey would like a refill of the following medications:  HYDROcodone-acetaminophen (NORCO) 5-325 MG per tablet [Manoj Daley PA-C]    Preferred pharmacy: [id number] [address] [address]    Comment:  Could I get a renewal on this until I can see then neurologist March 20th?Hydrocodone. I'm in pain and can't sleep and I've been very careful and not taken them unless I can't sand the pain anymore.Thank you.

## 2018-03-14 NOTE — TELEPHONE ENCOUNTER
norco  LRF 2/13/18, dispense 20  LOV 2/13/18    Checked   Refills  Oxycodone LRF 2/8/18, dispense 14  Gabapentin LRF 2/13/18, dispense 90  Norco, LRF 2/13/18, dispense 20    Routing refill request to provider for review/approval because:  Drug not on the [facility] refill protocol

## 2018-03-15 ENCOUNTER — DOCUMENTATION ONLY (OUTPATIENT)
Dept: CARE COORDINATION | Facility: CLINIC | Age: 73
End: 2018-03-15

## 2018-03-15 RX ORDER — HYDROCODONE BITARTRATE AND ACETAMINOPHEN 5; 325 MG/1; MG/1
1 TABLET ORAL AT BEDTIME
Qty: 10 TABLET | Refills: 0 | Status: SHIPPED | OUTPATIENT
Start: 2018-03-15 | End: 2018-05-01

## 2018-03-15 NOTE — PROGRESS NOTES
[facility] now requests orders and shares plan of care/discharge summaries for some patients through [employer].  Please REPLY TO THIS MESSAGE in order to give authorization for orders when needed.  This is considered a verbal order, you will still receive a faxed copy of orders for signature.  Thank you for your assistance in improving collaboration for our patients.    ORDER Requesting PT home care add orders for 1 x week for 2 weeks    MD SUMMARY/PLAN OF CARE Pt continues to present with marked weakness in B LE. Ongoing PT visits for strengthening and transfer training.

## 2018-03-20 ENCOUNTER — TRANSFERRED RECORDS (OUTPATIENT)
Dept: HEALTH INFORMATION MANAGEMENT | Facility: CLINIC | Age: 73
End: 2018-03-20

## 2018-03-22 ENCOUNTER — TELEPHONE (OUTPATIENT)
Dept: FAMILY MEDICINE | Facility: CLINIC | Age: 73
End: 2018-03-22

## 2018-03-22 NOTE — TELEPHONE ENCOUNTER
HANNA Pardo PT with  HC calling, [phone]    She is seeing patient for 1 more week and then will mostly discharge HC services at this time.     Patient will have few tests completed with Neurology sometime soon.     Ольга GARDNER RN, BSN, PHN  [employer] RN

## 2018-03-22 NOTE — TELEPHONE ENCOUNTER
Received Home Health Certification and Plan of Care, placed in Kulwant Daley's in basket.    Please review, sign and place in Dr. Duke's in basket for review.     Then fax to [phone]

## 2018-03-22 NOTE — TELEPHONE ENCOUNTER
Panel Management Review      Patient has the following on her problem list: None      Composite cancer screening  Chart review shows that this patient is due/due soon for the following Mammogram  Summary:    Patient is due/failing the following:   MAMMOGRAM    Action needed:   Patient needs referral/order: mammogram    Type of outreach:    None, spoke to patient at [facility] on 02/13/18, patient informed that she no longer gets mammograms done.     Questions for provider review:    None                                                                                                                                    Kiera Appiah MA        Chart closed.

## 2018-03-26 DIAGNOSIS — Z53.9 DIAGNOSIS NOT YET DEFINED: Primary | ICD-10-CM

## 2018-03-26 PROCEDURE — G0180 MD CERTIFICATION HHA PATIENT: HCPCS | Performed by: FAMILY MEDICINE

## 2018-03-27 ENCOUNTER — OFFICE VISIT (OUTPATIENT)
Dept: FAMILY MEDICINE | Facility: CLINIC | Age: 73
End: 2018-03-27
Payer: COMMERCIAL

## 2018-03-27 VITALS
TEMPERATURE: 98.5 F | OXYGEN SATURATION: 98 % | HEART RATE: 50 BPM | DIASTOLIC BLOOD PRESSURE: 74 MMHG | SYSTOLIC BLOOD PRESSURE: 130 MMHG | RESPIRATION RATE: 18 BRPM | HEIGHT: 66 IN

## 2018-03-27 DIAGNOSIS — M79.604 BILATERAL LEG PAIN: ICD-10-CM

## 2018-03-27 DIAGNOSIS — M79.605 BILATERAL LEG PAIN: ICD-10-CM

## 2018-03-27 DIAGNOSIS — I10 HTN, GOAL BELOW 140/90: ICD-10-CM

## 2018-03-27 DIAGNOSIS — Z86.19 HX OF LYME DISEASE: ICD-10-CM

## 2018-03-27 DIAGNOSIS — M62.81 GENERALIZED MUSCLE WEAKNESS: Primary | ICD-10-CM

## 2018-03-27 DIAGNOSIS — R29.898 LEG WEAKNESS, BILATERAL: Primary | ICD-10-CM

## 2018-03-27 DIAGNOSIS — M79.10 MYALGIA: ICD-10-CM

## 2018-03-27 PROCEDURE — 99214 OFFICE O/P EST MOD 30 MIN: CPT | Performed by: PHYSICIAN ASSISTANT

## 2018-03-27 PROCEDURE — 36415 COLL VENOUS BLD VENIPUNCTURE: CPT | Performed by: PSYCHIATRY & NEUROLOGY

## 2018-03-27 PROCEDURE — 82550 ASSAY OF CK (CPK): CPT | Performed by: PSYCHIATRY & NEUROLOGY

## 2018-03-27 RX ORDER — DULOXETIN HYDROCHLORIDE 60 MG/1
60 CAPSULE, DELAYED RELEASE ORAL DAILY
Qty: 30 CAPSULE | Refills: 2 | Status: SHIPPED | OUTPATIENT
Start: 2018-03-27 | End: 2018-05-03

## 2018-03-27 RX ORDER — LISINOPRIL 5 MG/1
5 TABLET ORAL DAILY
Qty: 90 TABLET | Refills: 1 | Status: ON HOLD | OUTPATIENT
Start: 2018-03-27 | End: 2018-08-02

## 2018-03-27 NOTE — TELEPHONE ENCOUNTER
Faxed back to [facility] at [phone], placed original copy in abstracting.   Kiera Appiah MA

## 2018-03-27 NOTE — MR AVS SNAPSHOT
After Visit Summary   3/27/2018    Cristy Bailey    MRN: [id number]           Patient Information     Date Of Birth          1945        Visit Information        Provider Department      3/27/2018 5:20 PM Manoj Daley PA-C [facility]        Today's Diagnoses     Leg weakness, bilateral    -  1    Bilateral leg pain        Hx of Lyme disease        HTN, goal below 140/90           Follow-ups after your visit        Additional Services     INFECTIOUS DISEASE REFERRAL       Your provider has referred you to: [facility]: [employer]  [place] [phone]   [email or web address]    Please be aware that coverage of these services is subject to the terms and limitations of your health insurance plan.  Call member services at your health plan with any benefit or coverage questions.      Please bring the following with you to your appointment:    (1) Any X-Rays, CTs or MRIs which have been performed.  Contact the facility where they were done to arrange for  prior to your scheduled appointment.    (2) List of current medications   (3) This referral request   (4) Any documents/labs given to you for this referral                  Follow-up notes from your care team     Return in about 6 weeks (around 5/8/2018) for recheck if symptoms are not improving after you other visits.      Who to contact     If you have questions or need follow up information about today's clinic visit or your schedule please contact [facility] directly at [phone].  Normal or non-critical lab and imaging results will be communicated to you by MyChart, letter or phone within 4 business days after the clinic has received the results. If you do not hear from us within 7 days, please contact the clinic through MyChart or phone. If you have a critical or abnormal lab result, we will notify you by phone as soon as possible.  Submit refill requests through [employer]  "or call your pharmacy and they will forward the refill request to us. Please allow 3 business days for your refill to be completed.          Additional Information About Your Visit        [employer] Information     [employer] gives you secure access to your electronic health record. If you see a primary care provider, you can also send messages to your care team and make appointments. If you have questions, please call your primary care clinic.  If you do not have a primary care provider, please call [phone] and they will assist you.        Care EveryWhere ID     This is your Care EveryWhere ID. This could be used by other organizations to access your [place] medical records  [id number]        Your Vitals Were     Pulse Temperature Respirations Height Pulse Oximetry       50 98.5  F (36.9  C) (Tympanic) 18 5' 6\" (1.676 m) 98%        Blood Pressure from Last 3 Encounters:   03/27/18 130/74   02/14/18 134/72   02/08/18 104/89    Weight from Last 3 Encounters:   02/08/18 250 lb (113.4 kg)   09/22/17 258 lb 8 oz (117.3 kg)   08/25/17 257 lb (116.6 kg)              We Performed the Following     INFECTIOUS DISEASE REFERRAL          Today's Medication Changes          These changes are accurate as of 3/27/18  5:52 PM.  If you have any questions, ask your nurse or doctor.               Start taking these medicines.        Dose/Directions    DULoxetine 60 MG EC capsule   Commonly known as:  CYMBALTA   Used for:  Leg weakness, bilateral, Bilateral leg pain   Started by:  Manoj Daley PA-C        Dose:  60 mg   Take 1 capsule (60 mg) by mouth daily   Quantity:  30 capsule   Refills:  2         These medicines have changed or have updated prescriptions.        Dose/Directions    lisinopril 5 MG tablet   Commonly known as:  PRINIVIL/ZESTRIL   This may have changed:  Another medication with the same name was removed. Continue taking this medication, and follow the directions you see here.   Used for:  HTN, goal below " 140/90   Changed by:  Manoj Daley PA-C        Dose:  5 mg   Take 1 tablet (5 mg) by mouth daily   Quantity:  90 tablet   Refills:  1         Stop taking these medicines if you haven't already. Please contact your care team if you have questions.     gabapentin 300 MG capsule   Commonly known as:  NEURONTIN   Stopped by:  Manoj Daley PA-C           oxyCODONE IR 5 MG tablet   Commonly known as:  ROXICODONE   Stopped by:  Manoj Daley PA-C                Where to get your medicines      These medications were sent to [id number] - [place], [place] - [address]  [address] [facility] [address]    Hours:  9Am-9Pm (M-F) 9Am-6Pm (S&S) Phone:  [phone]     DULoxetine 60 MG EC capsule    lisinopril 5 MG tablet                Primary Care Provider Office Phone # Fax #    Manoj Daley PA-C [phone] [address] [facility] [address]        Equal Access to Services     [facility]: Hadii aad ku hadasho Soomaali, waaxda luqadaha, qaybta kaalmada adeegyada, waxay idiin hayaan dylon davalos . So [facility] [phone].    ATENCIÓN: Si habla español, tiene a pompa disposición servicios gratuitos de asistencia lingüística. Llame al [phone].    We comply with applicable federal civil rights laws and [place] laws. We do not discriminate on the basis of race, color, national origin, age, disability, sex, sexual orientation, or gender identity.            Thank you!     Thank you for choosing [facility]  for your care. Our goal is always to provide you with excellent care. Hearing back from our patients is one way we can continue to improve our services. Please take a few minutes to complete the written survey that you may receive in the mail after your visit with us. Thank you!             Your Updated Medication List - Protect others around you: Learn how to safely use, store and throw away your medicines at  www.disposemymeds.org.          This list is accurate as of 3/27/18  5:52 PM.  Always use your most recent med list.                   Brand Name Dispense Instructions for use Diagnosis    clotrimazole 1 % cream    LOTRIMIN    15 g    APPLY TO AFFECTED AREA TOPICALLY 2 TIMES A DAY.    Rash       DULoxetine 60 MG EC capsule    CYMBALTA    30 capsule    Take 1 capsule (60 mg) by mouth daily    Leg weakness, bilateral, Bilateral leg pain       HYDROcodone-acetaminophen 5-325 MG per tablet    NORCO    10 tablet    Take 1 tablet by mouth At Bedtime    Bilateral leg pain       lisinopril 5 MG tablet    PRINIVIL/ZESTRIL    90 tablet    Take 1 tablet (5 mg) by mouth daily    HTN, goal below 140/90       order for DME     2 Package    Equipment being ordered: Compression stockings 20-30mmHg    Lower extremity edema

## 2018-03-27 NOTE — PROGRESS NOTES
SUBJECTIVE:   Cristy Bailey is a 72 year old female who presents to clinic today for the following health issues:      Hypertension/Leg Weakness Follow-up      Outpatient blood pressures are being checked at home.  Results vary.    Low Salt Diet: no added salt      Amount of exercise or physical activity: None    Problems taking medications regularly: No    Medication side effects: none    Diet: regular (no restrictions)      -Patient presents to follow up on BP, and her lower extremity pain    She continues to tolerate lisinopril well  Taking daily, no side effects  Checking BP at home, never out of range    -For her unexplained leg weakness, she has seen the neurologist, he has ordered an EMG, and CK labs  -the pain does not seem to be improving   -in fact it continues to be worse than when I first met her   -to review, our first meeting in fall of 2017 she was not wheelchair bound  -ot/pt has come out to ensure safety in the home and she has been doing some of the exercises with physical therapy (though without much improvement)  -she did improve initially on the gabapentin, but now this seems less effective   -taking just 2 daily; did not tolerate 3 daily; felt like her muscles werent effective,  more sore  -wondering about next steps    Problem list and histories reviewed & adjusted, as indicated.  Additional history: as documented    Patient Active Problem List   Diagnosis     HTN (hypertension)     Hyperlipidemia LDL goal <160     Abnormal glucose     Obesity     Advanced directives, counseling/discussion     Leg weakness, bilateral     CRP elevated     Vitamin D deficiency     Health Care Home     HTN, goal below 140/90     Constipation     Leg weakness     Constipation     Myalgia and myositis     Morbid obesity (H)     Past Surgical History:   Procedure Laterality Date     BIOPSY OF UTERUS LINING  3/2010    negative.      C C-SEC ONLY,PREV C-SEC      c-sec x 3      COLONOSCOPY  2/21/10    benign  "findings. advised 10 yr f/u.        Social History   Substance Use Topics     Smoking status: Former Smoker     Quit date: 1984     Smokeless tobacco: Never Used     Alcohol use No     Family History   Problem Relation Age of Onset     CEREBROVASCULAR DISEASE Father      -stroke at age 80     Family History Negative Mother       Diedn her 80's of unknown causes.  Pt otherwise unaware of her health hx.      Unknown/Adopted Mother      DIABETES Brother      (Christian)  of DM complications at age 50.     Alcohol/Drug Brother      Christian     C.A.D. Brother      Christian.      HEART DISEASE Brother      Christian--MI age 50.     Family History Negative Brother      Family History Negative Brother      Family History Negative Sister      Family History Negative Sister      Family History Negative Son      Family History Negative Daughter      Family History Negative Daughter            Reviewed and updated as needed this visit by clinical staff       Reviewed and updated as needed this visit by Provider         ROS:  Constitutional, HEENT, cardiovascular, pulmonary, gi and gu systems are negative, except as otherwise noted.    OBJECTIVE:     /74 (BP Location: Right arm, Patient Position: Chair, Cuff Size: Adult Large)  Pulse 50  Temp 98.5  F (36.9  C) (Tympanic)  Resp 18  Ht 5' 6\" (1.676 m)  SpO2 98%  There is no height or weight on file to calculate BMI.  GENERAL: healthy, alert and no distress  RESP: lungs clear to auscultation - no rales, rhonchi or wheezes  CV: regular rates and rhythm  MS: she is WC bound today. She can lift herself with her arms but did not want to do extensive testing today until she is done with her emg/ncv.   PSYCH: mentation appears normal and tearful    Diagnostic Test Results:  none     ASSESSMENT/PLAN:   1. Leg weakness, bilateral  2. Bilateral leg pain  3. Hx of Lyme disease  Cristy continues to experience unexplained lower extremity weakness (now 5+ years) and now " "increasing and unexplained pain over the past few months. She is just now getting plugged into the specialty appointments we had planned. First she is seeing neuro, will be getting an emg/ncv. We'll need to consider spine imaging if there is some loss there. If this is not indicative of anything we'll need to think outside of the box. She has been successfully treated for lyme twice \"around 10 years ago\", and so I am dubious for a post-lyme syndrome but that is something we'll need to consider given her bizarre constellation of symptoms. We'll also consider rheum. Additionally, we'll have her stop gabapentin as is has been ineffective and do a trial of duloxetine. She'll follow up once her neuro appts are complete.  - DULoxetine (CYMBALTA) 60 MG EC capsule; Take 1 capsule (60 mg) by mouth daily  Dispense: 30 capsule; Refill: 2  - INFECTIOUS DISEASE REFERRAL    4. HTN, goal below 140/90  Well controlled. Continue present management.   - lisinopril (PRINIVIL/ZESTRIL) 5 MG tablet; Take 1 tablet (5 mg) by mouth daily  Dispense: 90 tablet; Refill: 1    >50 % of the 25 minutes was spent in face to face counselling or coordination of care for the above issues.      Manoj Daley PA-C  [facility]  "

## 2018-03-28 ENCOUNTER — MYC MEDICAL ADVICE (OUTPATIENT)
Dept: FAMILY MEDICINE | Facility: CLINIC | Age: 73
End: 2018-03-28

## 2018-03-28 LAB — CK SERPL-CCNC: 29 U/L (ref 30–225)

## 2018-03-30 ENCOUNTER — TELEPHONE (OUTPATIENT)
Dept: FAMILY MEDICINE | Facility: CLINIC | Age: 73
End: 2018-03-30

## 2018-03-30 DIAGNOSIS — M79.605 BILATERAL LEG PAIN: ICD-10-CM

## 2018-03-30 DIAGNOSIS — R29.898 WEAKNESS OF BOTH LOWER EXTREMITIES: ICD-10-CM

## 2018-03-30 DIAGNOSIS — M79.604 BILATERAL LEG PAIN: ICD-10-CM

## 2018-03-30 RX ORDER — GABAPENTIN 300 MG/1
300 CAPSULE ORAL 2 TIMES DAILY
Qty: 180 CAPSULE | Refills: 0 | Status: SHIPPED | OUTPATIENT
Start: 2018-03-30 | End: 2018-05-10

## 2018-03-30 NOTE — TELEPHONE ENCOUNTER
Char calls back, and they would like to extend physical therapy one more week, until the  EMG is done and they figure out what is going on, gave verbal ok for this.   Also needs AVS mailed to her, she feels she left it at the pharmacy by accident.  Mailed this to her home address today.  Vee Montalvo, JEEVAN  Triage Nurse

## 2018-03-30 NOTE — TELEPHONE ENCOUNTER
Char physical therapy with  home care calling.   Patient had sent my chart message on 3/28 about this-she had a reaction to the Cymbalta,  With dizziness, itching, and burning sensation. She was advised to stop taking it, and she did.  She is feeling improved. Had to go back to the Gabapentin. Taking 2 daily of the Gabapentin.  Will need a refill of it soon. Can you send this in for her? Has about 10 days of it left.   She is having an EMG on Monday.    Last home care visit today. Goals have not been met for therapy yet. She is still having a lot of pain.  [phone] to call Char if questions.     Vee Montalvo, RN  Triage Nurse

## 2018-03-30 NOTE — TELEPHONE ENCOUNTER
I will refill the gabapentin. Please call Cristy and let her know. Ok for continuation of physical therapy

## 2018-04-02 ENCOUNTER — MYC MEDICAL ADVICE (OUTPATIENT)
Dept: FAMILY MEDICINE | Facility: CLINIC | Age: 73
End: 2018-04-02

## 2018-04-03 NOTE — TELEPHONE ENCOUNTER
We have not seen the neurology notes. What did they say/do? Was there any neurologic change in the lower extremities? I will have her see the pain clinic but she will need to make more appts so that we can continue to figure this out. I am willing to give a small supply of medication in the meantime for time when pain is at its worst and to help with appts.

## 2018-04-03 NOTE — TELEPHONE ENCOUNTER
Called the pt and left a message to call us back.  Spoke to Kulwant about this - he is wondering if the pt can get us notes from neurology.  Please also see note below also and advise.

## 2018-04-03 NOTE — TELEPHONE ENCOUNTER
Pt calls back.  Advised of below.  She is crying.  She is going to call Neurology and see if she can get the notes faxed to us.  She said she just saw them yesterday.      Advised about the pain clinic, she says I don't even know where that is.  Are you going to be putting in an order for this?

## 2018-04-05 ENCOUNTER — TELEPHONE (OUTPATIENT)
Dept: FAMILY MEDICINE | Facility: CLINIC | Age: 73
End: 2018-04-05

## 2018-04-05 ENCOUNTER — PATIENT OUTREACH (OUTPATIENT)
Dept: CARE COORDINATION | Facility: CLINIC | Age: 73
End: 2018-04-05

## 2018-04-05 NOTE — TELEPHONE ENCOUNTER
PCP FYI: Char from [facility] called in to report they will be discharging the Pt from HC starting today.     Pt is declining any further HC visits from PT and social work.   The Pt is declining any mental health nursing.   Care Coordination will be reaching out to the Pt to discuss further, but as of now, HC will be discharging her based on her declining services.     Char: [phone]    Mariella La RN -- [facility]

## 2018-04-05 NOTE — TELEPHONE ENCOUNTER
We received the first neurology visit note but not yet the 2nd note with EMG results. If she was told that the studies were normal, we should plan the next visit. She could see infectious disease for a visit - we'd discussed this previously. I will also put in a referral for pain clinic. Is she open to these ideas? A point I want to stress is that we will need to continue to investigate the cause of her increasing symptoms by seeing specialists.

## 2018-04-05 NOTE — PROGRESS NOTES
Clinic Care Coordination Contact  Care Team Conversations  Call from [facility] PT, Char who is scheduled to see patient today and has not been able to reach her. She asked if she should try to visit anyway. CC referred her back to her supervisor for advice.      Char is closing patient to home care today and she has been unable to meet her goals.  She thinks that patient is having difficulty getting her pain needs addressed. She is working with her PCP and neurologist to determine treatment for extreme pain.  She has been communicating with PCP who is trying to get records from neurology visit.      Char will call if she is not able to see patient today.      Plan- CC to call patient tomorrow to assess needs.    Francisca Hernandez,   [facility]  [email or web address]  [phone]

## 2018-04-05 NOTE — PROGRESS NOTES
Clinic Care Coordination Contact    OUTREACH    Referral Information:  Referral Source: Home Care    Primary Diagnosis: Chronic Pain    Chief Complaint   Patient presents with     Clinic Care Coordination - Initial        Universal Utilization: Is is difficult for her to get into clinics and finds it frustrating when she has to get up on a table for examine and did not get assistance to get up and down from table at neurologist. She does not want to go to any more appointments without a plan.  She has a hard time getting into and out of the car.   Utilization    Last refreshed: 4/5/2018  1:31 PM:  No Show Count (past year) 0       Last refreshed: 4/5/2018  1:31 PM:  ED visits 1       Last refreshed: 4/5/2018  1:31 PM:  Hospital admissions 0          Current as of: 4/5/2018  1:31 PM           Clinical Concerns:  Current Medical Concerns:  Is in pain and went to neurologist who did muscle test and nerve test and did not find anything wrong. Was told that she should go to hospital to determine what is wrong with her and she thinks that would be a good idea. The pain at night and in the morning are bad.  She is taking Gabapentin and has lots of it.  She is also taking Tylenol and Ibuprofen but is unsure how much and how often to take along with the Gabapentin.  She worries that she may take too much.  Her PA Kulwant referred her to the Internal Medicine Clinic in [place], but she is not sure what kind of appointment this would be.   She understands that a referral to a pain clinic may be necessary.     Current Behavioral Concerns: Her partner Leo is so frustrated that she is in pain and she is glad he can go to work and be away from it.  She was not crying during the call, but cries often during the day. Refused referral to mental health services.    Education Provided to patient: Offered support and help with understanding next steps for her care.   Reviewed how pain clinics work.      Health Maintenance Reviewed:  Due/Overdue   Health Maintenance Due   Topic Date Due     HEPATITIS C SCREENING  05/30/1963     PNEUMOCOCCAL (1 of 2 - PCV13) 05/30/2012     MAMMO SCREEN Q2 YR (SYSTEM ASSIGNED)  08/01/2013     ADVANCE DIRECTIVE PLANNING Q5 YRS  08/02/2016       Medication Management:  Has questions about how often she should be taking Tylenol and Ibuprofen with the Gabapentin.       Functional Status:  Mobility Status: Independent w/Device     Transportation:  Transportation means:: Regular car     Psychosocial: Completed home care, but did not reach her goals.  She refused mental health nurse visit     Financial/Insurance: DId not address during this call.      Resources and Interventions:  Current Resources:  ;    Advanced Care Plans/Directives on file:: No        Goals:   Goals        General    Pain Management (pt-stated)     Notes - Note created  4/5/2018  2:53 PM by Francisca Hernandez LSW    Goal Statement: I will have less pain.  Measure of Success: Will have intervention that addresses my pain.   Supportive Steps to Achieve: CC will contact PCP to discuss patient concerns and next steps.   Barriers: Cannot leave the house without extreme effort. Is having extreme pain and does not have any sense that it will get better. Her  is her caregiver and he is frustrated with her pain.  Strengths: Has  who supports her.    Date to Achieve By: 9-1-2018              Patient/Caregiver understanding: She will call CC with concerns. CC will discuss options with PCP and communicate with patient about next steps.   Outreach Frequency: weekly       Plan: CC to route to PCP and will establish plan with PCP.  Francisca Hernandez,   [facility]  [email or web address]  [phone]

## 2018-04-05 NOTE — LETTER
[facility] Home  Complex Care Plan  About Me  Patient Name:  Cristy Bailey    YOB: 1945  Age:     72 year old   Az MRN:   [id number] Telephone Information:    Home Phone [phone]   Mobile [phone]       Address:    [address]   [place] [address] Email address:  [email or web address]      Emergency Contact(s)  Name Relationship Lgl Grd Work Phone Home Phone Mobile Phone   1. BOBBI VILLEGAS Friend  none [phone]   2. KYARA GARCÍA Sister   [phone]            Primary language:  English     needed? No   [employer] [employer]:  [phone] op. 1  Other communication barriers:  NA  Preferred Method of Communication:  Mail  Current living arrangement:  With spouse  Mobility Status/ Medical Equipment: Independent w/Device    Health Maintenance  Health Maintenance Reviewed: Due/Overdue   Health Maintenance Due   Topic Date Due     HEPATITIS C SCREENING  05/30/1965     PNEUMOCOCCAL (1 of 2 - PCV13) 05/30/2012     MAMMO SCREEN Q2 YR (SYSTEM ASSIGNED)  08/01/2013     ADVANCE DIRECTIVE PLANNING Q5 YRS  08/02/2016         My Access Plan  Medical Emergency 911   Primary Clinic Line [facility] - [phone]   24 Hour Appointment Line [phone] or  [phone] ([phone]) (toll-free)   24 Hour Nurse Line [phone] (toll-free)   Preferred Urgent Care     [facility]     [id number] [place], [place] - [address]     Behavioral Health Crisis Line The National Suicide Prevention Lifeline at 1-129.374.9850 or 911     My Care Team Members  Patient Care Team       Relationship Specialty Notifications Start End    Manoj Daley PA-C PCP - General Physician Assistant  2/14/18     Phone: [phone] Fax: [phone]         [address] [facility] [address]    Francisca Hernandez LSW Lead Care Coordinator Primary Care - CC  4/5/18     Phone: [phone] Fax:  [phone]             My Care Plans  Self Management and Treatment Plan  Goals and (Comments)  Goals        General    Pain Management (pt-stated)     Notes - Note created  4/5/2018  2:53 PM by Francisca Hernandez LSW    Goal Statement: I will have less pain.  Measure of Success: Will have intervention that addresses my pain.   Supportive Steps to Achieve: CC will contact PCP to discuss patient concerns and next steps.   Barriers: Cannot leave the house without extreme effort. Is having extreme pain and does not have any sense that it will get better. Her  is her caregiver and he is frustrated with her pain.  Strengths: Has  who supports her.    Date to Achieve By: 9-1-2018             Advance Care Plans/Directives Type:        My Medical and Care Information  Problem List   Patient Active Problem List   Diagnosis     HTN (hypertension)     Hyperlipidemia LDL goal <160     Abnormal glucose     Obesity     Advanced directives, counseling/discussion     Leg weakness, bilateral     CRP elevated     Vitamin D deficiency     Health Care Home     HTN, goal below 140/90     Constipation     Leg weakness     Constipation     Myalgia and myositis     Morbid obesity (H)      Current Medications and Allergies:    Current Outpatient Prescriptions   Medication     gabapentin (NEURONTIN) 300 MG capsule     DULoxetine (CYMBALTA) 60 MG EC capsule     lisinopril (PRINIVIL/ZESTRIL) 5 MG tablet     HYDROcodone-acetaminophen (NORCO) 5-325 MG per tablet     clotrimazole (LOTRIMIN) 1 % cream     order for DME     No current facility-administered medications for this visit.          Care Coordination Start Date: 4/5/2018   Frequency of Care Coordination: weekly   Form Last Updated: 04/06/2018

## 2018-04-05 NOTE — LETTER
[facility] COORDIN[facility]  [address] [place] [address]        April 6, 2018    Cristy Bailey  [address]   [place] [address]      Dear Cristy,    I am a clinic care coordinator who works with aMnoj Daley PA-C at [phone]. I wanted to thank you for spending the time to talk with me.  I wanted to introduce myself and provide you with my contact information so that you can call me with questions or concerns about your health care. Below is a description of clinic care coordination and how I can further assist you.     The clinic care coordinator is a registered nurse and/or  who understand the health care system. The goal of clinic care coordination is to help you manage your health and improve access to the [facility] system in the most efficient manner. The registered nurse can assist you in meeting your health care goals by providing education, coordinating services, and strengthening the communication among your providers. The  can assist you with financial, behavioral, psychosocial, chemical dependency, counseling, and/or psychiatric resources.    Please feel free to contact me at [phone], with any questions or concerns. We at [facility] are focused on providing you with the highest-quality healthcare experience possible and that all starts with you.     Sincerely,     Francisca Hernandez    Enclosed: I have enclosed a copy of the Complex Care Plan. This has helpful information and goals that we have talked about. Please keep this in an easy to access place to use as needed.

## 2018-04-05 NOTE — TELEPHONE ENCOUNTER
Called the Pt (two attempts and line was busy). Not able to leave voicemail.   Will need to recall.     Mariella La RN -- [facility]

## 2018-04-06 NOTE — PROGRESS NOTES
Clinic Care Coordination Contact  Care Team Conversations  Talked with KRISH Francois to review options for patient. He reviewed that last fall she was using a walker and now is using wheelchair without knowing the reason for her pain and lack of mobility.  Patient denies mental health concerns.  According to patient, neurology found no concerns after testing. He had long discussion with her about the Lymes disease and she was open to working with infectious disease clinic and he gave her referral to [facility] to rule out Lymes.  She agreed to that in clinic.      He would like her to have consult at [facility] to rule out Lyme's Disease.  He would be willing to offer limited amounts of pain medication to use the day of appointments to assist with being able to have examination.  He would consider doing a back x ray either in clinic or at [facility] to rule out spine issues.      He would prefer to determine cause of pain prior to sending her to pain clinic.  She could try 2 extra strength Tylenol and 2 300 mg Ibuprofen every 3 hours, alternating between the two.      Clinic Care Coordination Contact    OUTREACH    Referral Information:  Referral Source: Home Care    Primary Diagnosis: Chronic Pain    Chief Complaint   Patient presents with     Clinic Care Coordination - Initial        Universal Utilization: appropriate utilization  Utilization    Last refreshed: 4/6/2018  7:40 AM:  No Show Count (past year) 0       Last refreshed: 4/6/2018  7:40 AM:  ED visits 1       Last refreshed: 4/6/2018  7:40 AM:  Hospital admissions 0          Current as of: 4/6/2018  7:40 AM           Clinical Concerns:  Current Medical Concerns:  Pain her hips, and lower extremities. Pain gets worse towards her feet. Feels like her upper body is getting weaker as she notices when she is transferring herself out of chair or wheelchair.  She is able to transfer independently with something to hold onto and can stand for a few minutes.  Her feet swell somewhat if in her wheelchair for long periods of time. She feels that the pain is muscular in nature.        Current Behavioral Concerns: Is frustrated with lack of progress in addressing her pain. Would prefer to go into hospital for tests versus going to outpatient clinic appointments. Denies need for mental health services.      Education Provided to patient: Discussed that the hospital may not be best option to help with diagnosis as insurance may not cover a hospitalization for observation.  Reviewed that she was in ED and had exam in February and did not uncover cause, though she got relief with IV pain medications so she felt better.  She understands that going to hospital for tests as it would be more convenient to have doctors come to her, is not usually covered by Medicare. She cannot afford to have bills for hospital stays.     Reviewed how to use [employer] and she will consider that option as she is certified and Leo could go with her. It may be easier to use [employer] versus Leo lifting her legs into the car and causing her pain.      Health Maintenance Reviewed: Due/Overdue   Health Maintenance Due   Topic Date Due     HEPATITIS C SCREENING  05/30/1965     PNEUMOCOCCAL (1 of 2 - PCV13) 05/30/2012     MAMMO SCREEN Q2 YR (SYSTEM ASSIGNED)  08/01/2013     ADVANCE DIRECTIVE PLANNING Q5 YRS  08/02/2016       Medication Management:  Clarified that she can take 2 200 mg of Ibuprofen and 2 Extra Strength Tylenol alternating these, every 3 hours to see if that relieves some pain. She is concerned about taking too much and effecting her liver functioning.  She has not been taking any thing except before bed and during the night when she can't sleep. She agreed to take it regularly over the weekend to see if it helps with her pain.  Understands that taking opiods is not a good long term plan. Explained that KRISH Francois would be willing to prescribe a limited amount of pain medication for the  "days that she has OV if that would assist in completing appointments.  She knows that the opoids do not take away her pain, but give her a \"floating\" feeling making it easier to deal with the pain.      Functional Status:  Mobility Status: Independent w/Device  Equipment Currently Used at Home:  (Getting new wheel chair, fitting on Tuesday, 4-10.  Has OT coming out to see if the wheelchair fits her, if it does, a new wheelchair will be ordered. The one she is using does not fit her well.  Leo has put grab bars by toilet and has a raised toilet seat.  Sleeps in recliner.     Transportation means:: [employer], Leo/, can drive, but she has extreme pain when he lifts her legs into the car.      Psychosocial:      Type of residence:: Private home - no stairs  Financial/Insurance: [employer].      Resources and Interventions:  Current Resources:     Advanced Care Plans/Directives on file:: No        Goals:   Goals        General    Pain Management (pt-stated)     Notes - Note created  4/5/2018  2:53 PM by Francisca Hernandez LSW    Goal Statement: I will have less pain.  Measure of Success: Will have intervention that addresses my pain.   Supportive Steps to Achieve: CC will contact PCP to discuss patient concerns and next steps.   Barriers: Cannot leave the house without extreme effort. Is having extreme pain and does not have any sense that it will get better. Her  is her caregiver and he is frustrated with her pain.  Strengths: Has  who supports her.    Date to Achieve By: 9-1-2018          [facility] if they can assure her that they will be able to examine her which means they will assist her on and off exam table.  She will ask when setting up appointment. She wants to call CC when appointment is made. She has the information on the referral.      Patient/Caregiver understanding: She will start taking OTC Tylenol and Ibuprofen for pain on a regular basis and see " what changes.  She will call [facility] and will talk to  about transportation to clinic. She will meet with OT to get new wheelchair ordered.   Outreach Frequency: weekly     Plan: CC to call in one week or respond to patient concerns prior to then.      Francisca Hernandez,   [facility]  [email or web address]  [phone]

## 2018-04-09 NOTE — TELEPHONE ENCOUNTER
Pt calls back.   Her appt is May 2 at 10:00 with Dr. Jeana Spencer.      They need the referral faxed to them - [phone].  This was faxed.

## 2018-04-09 NOTE — TELEPHONE ENCOUNTER
Called and left a message to call us back.      Will forward to Kulwant Daley to place order for pain clinic...not seeing that it has been ordered.

## 2018-04-09 NOTE — TELEPHONE ENCOUNTER
Pt calls back.      Advised of below.   She says she has the number for [facility].  She will try to make the appt today.  I advised we will get her the number for the pain clinic once referral is in.

## 2018-04-10 ENCOUNTER — MEDICAL CORRESPONDENCE (OUTPATIENT)
Dept: HEALTH INFORMATION MANAGEMENT | Facility: CLINIC | Age: 73
End: 2018-04-10

## 2018-04-10 ENCOUNTER — DOCUMENTATION ONLY (OUTPATIENT)
Dept: FAMILY MEDICINE | Facility: CLINIC | Age: 73
End: 2018-04-10

## 2018-04-10 ENCOUNTER — PATIENT OUTREACH (OUTPATIENT)
Dept: CARE COORDINATION | Facility: CLINIC | Age: 73
End: 2018-04-10

## 2018-04-10 NOTE — PROGRESS NOTES
Received Home Health Certification and Plan of Care, placed in [employer]'s in basket, please give to Dr. Duke for signature.    Please review, sign and fax back to [phone].

## 2018-04-10 NOTE — PROGRESS NOTES
Clinic Care Coordination Contact    OUTREACH    Referral Information:  Chief Complaint   Patient presents with     Clinic Care Coordination - Follow-up        Universal Utilization: appropriate utilization  Utilization    Last refreshed: 4/9/2018  2:53 PM:  No Show Count (past year) 0       Last refreshed: 4/9/2018  2:53 PM:  ED visits 1       Last refreshed: 4/9/2018  2:53 PM:  Hospital admissions 0          Current as of: 4/9/2018  2:53 PM           Clinical Concerns:  Current Medical Concerns:  Cannot walk and wants to know why she has gotten so weak.  Met with JAIME Garcia, and with Mary wheelchair  and got fitted today for a new wheelchair.  She called CC to ask to have PA Francois sign off on the orders when they arrive without having her come into clinic for OV.   He has seen her in a WC and knows she needs one.   Current Behavioral Concerns: Is frustrated with how things don't get done and she has to do so much of the work herself.      Education Provided to patient: Reviewed that PA Alessandro does not decide how insurance works with getting a WC. It may require a face to face appointment. If it does, she is going to buy a wheelchair off the internet as she cannot come into clinic for this purpose.  It is too hard for her to get transportation to clinic.       Health Maintenance Reviewed:  She is not going into clinic at this time as it is too difficult.     Medication Management:  No concerns identified.      Functional Status:   No concerns identified.      Transportation: She called [employer] to set up a ride to upcoming [employer] in May and learned that it would take her three transfers to get to this appointment in [place].  She is out of service area for [employer] and would have to go to the [employer] transit station for a PU location.  She is going to have Leo take her and it will be painful to get legs into car.        Psychosocial:   No concerns.       Financial/Insurance: Discussed how  Medicare pays for DME and the need for a face to face may be necessary.       Resources and Interventions:  Current Resources:  OT helping her get a new wheelchair.  They took measurements today and will send paper work to clinic for signature.       Goals:   Goals        General    Pain Management (pt-stated)     Notes - Note created  4/5/2018  2:53 PM by Francisca Hernandez LSW    Goal Statement: I will have less pain.  Measure of Success: Will have intervention that addresses my pain.   Supportive Steps to Achieve: CC will contact PCP to discuss patient concerns and next steps.   Barriers: Cannot leave the house without extreme effort. Is having extreme pain and does not have any sense that it will get better. Her  is her caregiver and he is frustrated with her pain.  Strengths: Has  who supports her.    Date to Achieve By: 9-1-2018        She called and made an appointment with [facility] but they did not understand why she was making an appointment. She was frustrated that a referral was not entered into system. She told them it was because of Lyme's. She will have  take her to appointment.        Patient/Caregiver understanding: CC to call in two weeks to assess needs. Patient to continue with medications and to work with OT on getting wheelchair.       Plan: CC to call in two weeks.     Francisca Hernandez,   [facility]  [email or web address]  [phone]

## 2018-04-26 ENCOUNTER — PATIENT OUTREACH (OUTPATIENT)
Dept: CARE COORDINATION | Facility: CLINIC | Age: 73
End: 2018-04-26

## 2018-04-26 NOTE — PROGRESS NOTES
"Clinic Care Coordination Contact    OUTREACH    Referral Information: CC call regarding her goal.     Chief Complaint   Patient presents with     Clinic Care Coordination - Follow-up        Universal Utilization: difficult to get to appointments.   Utilization    Last refreshed: 4/26/2018  7:56 AM:  No Show Count (past year) 0       Last refreshed: 4/26/2018  7:56 AM:  ED visits 1       Last refreshed: 4/26/2018  7:56 AM:  Hospital admissions 0          Current as of: 4/26/2018  7:56 AM           Clinical Concerns:  Current Medical Concerns:  Continues to have significant pain. Having to urinate every hour every hour during the night.  Difficulty transferring herself and scared of falling. Her hips are very painful. Cannot sit her in lift chair as it is too soft.  Her pain is continuous.  She knows she can take the Tylenol and Ibuproen.  Has not seen PCP recently.      Current Behavioral Concerns: Is scared and \"at her wit's end.\"  Crying during the call.  Was able to sit outside and that helped.      Education Provided to patient: Reviewed [facility] services and how to initiate.  She would like CC to make referral to [facility] to get assessment. She is willing to take a call from them to schedule the appointment.  CC left message at [facility] intake.  They are to return the call in two days.       Health Maintenance Reviewed:  Not during this call.     Medication Management:     Did not address during this call.      Functional Status:   Was able fix dinner today for . Difficult to do IADL's. Unsure where the process is for getting a new wheelchair.      Transportation: She cannot use [employer] to get to appointments as it would take too many transfers.  She is not sure if she will be able to get and out of her car. She is planning on attending [facility] on May 2nd.       Psychosocial:   Reviewed that she declined referral to [facility] services when [facility] offered " to make referral.  She is now open to having assessment done to determine what she qualifies for at home.      Financial/Insurance: Low income and limited assets.  [employer].      Resources and Interventions:  Current Resources:  none      Goals:   Goals        General    Pain Management (pt-stated)     Notes - Note created  4/5/2018  2:53 PM by Francisca Hernandez LSW    Goal Statement: I will have less pain.  Measure of Success: Will have intervention that addresses my pain.   Supportive Steps to Achieve: CC will contact PCP to discuss patient concerns and next steps.   Barriers: Cannot leave the house without extreme effort. Is having extreme pain and does not have any sense that it will get better. Her  is her caregiver and he is frustrated with her pain.  Strengths: Has  who supports her.    Date to Achieve By: 9-1-2018          Patient/Caregiver understanding: Discussed going into the ED if she is not able to get out of the house to appointments.  She is afraid to return to ED as the previous ED visit was not helpful and they did not understand why she was there and turned her away. She feels that she has gotten worse since then.  CC offered to discuss her situation with her PCP to coordinate a plan.      CC looked for [facility] appointment for patient and did not see one on her future appointments.  Called appointment line and the hold time was too long.       Plan: Call appointment line tomorrow, coordinate with PCP to address patient needs.      Francisca Hernandez,   [facility]  [email or web address]  [phone]    Clinic Care Coordination Contact  Care Team Conversations    Called [facility] scheduling and they do not show an appointment, but they do not schedule for this clinic. Called [facility] and they do have appointment on May 2 at 10 AM.  Call back from [facility] and completed referral. She will call patient to set up intake and complete intake.  They are scheduling  assessments the second week of May.     Plan- Coordinate with PCP and talk to patient about plan.    Francisca Hernandez,   [facility]  [email or web address]  [phone]

## 2018-04-30 ENCOUNTER — MYC MEDICAL ADVICE (OUTPATIENT)
Dept: FAMILY MEDICINE | Facility: CLINIC | Age: 73
End: 2018-04-30

## 2018-04-30 ENCOUNTER — APPOINTMENT (OUTPATIENT)
Dept: GENERAL RADIOLOGY | Facility: CLINIC | Age: 73
End: 2018-04-30
Attending: EMERGENCY MEDICINE
Payer: COMMERCIAL

## 2018-04-30 ENCOUNTER — HOSPITAL ENCOUNTER (EMERGENCY)
Facility: CLINIC | Age: 73
Discharge: HOME | End: 2018-04-30
Attending: EMERGENCY MEDICINE | Admitting: EMERGENCY MEDICINE
Payer: COMMERCIAL

## 2018-04-30 VITALS
TEMPERATURE: 97.7 F | DIASTOLIC BLOOD PRESSURE: 111 MMHG | SYSTOLIC BLOOD PRESSURE: 134 MMHG | OXYGEN SATURATION: 96 % | BODY MASS INDEX: 42.52 KG/M2 | HEART RATE: 80 BPM | RESPIRATION RATE: 20 BRPM | WEIGHT: 263.45 LBS

## 2018-04-30 DIAGNOSIS — M79.604 BILATERAL LEG PAIN: ICD-10-CM
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
## 2018-04-30 DIAGNOSIS — M79.605 BILATERAL LEG PAIN: ICD-10-CM

## 2018-04-30 DIAGNOSIS — R29.898 LEG WEAKNESS, BILATERAL: Primary | ICD-10-CM

## 2018-04-30 LAB
ALBUMIN SERPL-MCNC: 3.8 G/DL (ref 3.4–5)
ALP SERPL-CCNC: 100 U/L (ref 40–150)
ALT SERPL W P-5'-P-CCNC: 30 U/L (ref 0–50)
ANION GAP SERPL CALCULATED.3IONS-SCNC: 10 MMOL/L (ref 3–14)
AST SERPL W P-5'-P-CCNC: 24 U/L (ref 0–45)
BASOPHILS # BLD AUTO: 0 10E9/L (ref 0–0.2)
BASOPHILS NFR BLD AUTO: 0.2 %
BILIRUB SERPL-MCNC: 0.6 MG/DL (ref 0.2–1.3)
BUN SERPL-MCNC: 27 MG/DL (ref 7–30)
CALCIUM SERPL-MCNC: 9 MG/DL (ref 8.5–10.1)
CHLORIDE SERPL-SCNC: 108 MMOL/L (ref 94–109)
CO2 SERPL-SCNC: 24 MMOL/L (ref 20–32)
CREAT SERPL-MCNC: 0.58 MG/DL (ref 0.52–1.04)
DIFFERENTIAL METHOD BLD: ABNORMAL
EOSINOPHIL # BLD AUTO: 0.1 10E9/L (ref 0–0.7)
EOSINOPHIL NFR BLD AUTO: 0.4 %
ERYTHROCYTE [DISTWIDTH] IN BLOOD BY AUTOMATED COUNT: 14.1 % (ref 10–15)
GFR SERPL CREATININE-BSD FRML MDRD: >90 ML/MIN/1.7M2
GLUCOSE SERPL-MCNC: 113 MG/DL (ref 70–99)
HCT VFR BLD AUTO: 43.4 % (ref 35–47)
HGB BLD-MCNC: 14.3 G/DL (ref 11.7–15.7)
IMM GRANULOCYTES # BLD: 0 10E9/L (ref 0–0.4)
IMM GRANULOCYTES NFR BLD: 0.2 %
LYMPHOCYTES # BLD AUTO: 1.4 10E9/L (ref 0.8–5.3)
LYMPHOCYTES NFR BLD AUTO: 11.4 %
MCH RBC QN AUTO: 29.7 PG (ref 26.5–33)
MCHC RBC AUTO-ENTMCNC: 32.9 G/DL (ref 31.5–36.5)
MCV RBC AUTO: 90 FL (ref 78–100)
MONOCYTES # BLD AUTO: 0.9 10E9/L (ref 0–1.3)
MONOCYTES NFR BLD AUTO: 6.9 %
NEUTROPHILS # BLD AUTO: 10 10E9/L (ref 1.6–8.3)
NEUTROPHILS NFR BLD AUTO: 80.9 %
NRBC # BLD AUTO: 0 10*3/UL
NRBC BLD AUTO-RTO: 0 /100
PLATELET # BLD AUTO: 283 10E9/L (ref 150–450)
POTASSIUM SERPL-SCNC: 3.8 MMOL/L (ref 3.4–5.3)
PROT SERPL-MCNC: 7.6 G/DL (ref 6.8–8.8)
RBC # BLD AUTO: 4.82 10E12/L (ref 3.8–5.2)
SODIUM SERPL-SCNC: 142 MMOL/L (ref 133–144)
WBC # BLD AUTO: 12.4 10E9/L (ref 4–11)

## 2018-04-30 PROCEDURE — 99285 EMERGENCY DEPT VISIT HI MDM: CPT | Mod: 25 | Performed by: EMERGENCY MEDICINE

## 2018-04-30 PROCEDURE — 85025 COMPLETE CBC W/AUTO DIFF WBC: CPT | Performed by: EMERGENCY MEDICINE

## 2018-04-30 PROCEDURE — 36415 COLL VENOUS BLD VENIPUNCTURE: CPT | Performed by: EMERGENCY MEDICINE

## 2018-04-30 PROCEDURE — 72170 X-RAY EXAM OF PELVIS: CPT

## 2018-04-30 PROCEDURE — 99285 EMERGENCY DEPT VISIT HI MDM: CPT | Mod: Z6 | Performed by: EMERGENCY MEDICINE

## 2018-04-30 PROCEDURE — 80053 COMPREHEN METABOLIC PANEL: CPT | Performed by: EMERGENCY MEDICINE

## 2018-04-30 PROCEDURE — 72100 X-RAY EXAM L-S SPINE 2/3 VWS: CPT

## 2018-04-30 PROCEDURE — 25000132 ZZH RX MED GY IP 250 OP 250 PS 637: Performed by: EMERGENCY MEDICINE

## 2018-04-30 RX ORDER — OXYCODONE HYDROCHLORIDE 5 MG/1
5 TABLET ORAL ONCE
Status: COMPLETED | OUTPATIENT
Start: 2018-04-30 | End: 2018-04-30

## 2018-04-30 RX ORDER — GABAPENTIN 300 MG/1
300 CAPSULE ORAL ONCE
Status: COMPLETED | OUTPATIENT
Start: 2018-04-30 | End: 2018-04-30

## 2018-04-30 RX ORDER — ACETAMINOPHEN 500 MG
1000 TABLET ORAL ONCE
Status: COMPLETED | OUTPATIENT
Start: 2018-04-30 | End: 2018-04-30

## 2018-04-30 RX ADMIN — ACETAMINOPHEN 1000 MG: 500 TABLET, FILM COATED ORAL at 12:03

## 2018-04-30 RX ADMIN — GABAPENTIN 300 MG: 300 CAPSULE ORAL at 12:40

## 2018-04-30 RX ADMIN — ACETAMINOPHEN 1000 MG: 500 TABLET, FILM COATED ORAL at 15:38

## 2018-04-30 RX ADMIN — OXYCODONE HYDROCHLORIDE 5 MG: 5 TABLET ORAL at 15:38

## 2018-04-30 ASSESSMENT — ENCOUNTER SYMPTOMS
NERVOUS/ANXIOUS: 1
CONFUSION: 0
WEAKNESS: 1

## 2018-04-30 NOTE — ED TRIAGE NOTES
Pt arrived by EMS from home with c/o hip and BLE pain from sitting in her wheelchair. EMS reports she has intolerable pain which increases with minimal movement. Vitals stable. Pt alert and oriented. EMS gave Ketamine 75 mg IV and Versed 1 mg IV. Per EMS pt is able to stand and pivot with assistance of 1.

## 2018-04-30 NOTE — ED PROVIDER NOTES
History     Chief Complaint   Patient presents with     Fall     Hip Pain     HPI  Cristy Bailey is a 72 year old female with history of leg weakness and HTN presents the ED after a fall this morning.  Per review of her chart, she was last seen by her PCP on 3/27/18, and it was noted the patient has had ongoing unexplained low bilateral lower extremity weakness for 5+ years and is currently taking gabapentin for this.  When she was initially seen by her PCP for this chronic problem,  she was not wheelchair-bound in 2017.  PCP has made referrals for her to see specialists including neurology and considering rheumatology.      Patient states that she ambulates with a wheelchair at home but is usually able to stand on her own with use of a pole for support to use the restroom.  However, today she fell and landed on her buttocks onto the ground.  She does complain of bilateral hip pain as well as her typical chronic bilateral leg pain which was exacerbated by the fall.  She states her chronic bilateral leg pain radiates from her knees up towards her thighs sharp in nature.  She also states that she cannot bend her knee secondary to pain.    PAST MEDICAL HISTORY  Past Medical History:   Diagnosis Date     HTN (hypertension) 2010      Leg weakness 3/24/2015     Lyme disease S AND     lYME DZ LIKE SXS RESPONDED TO ABX     PAST SURGICAL HISTORY  Past Surgical History:   Procedure Laterality Date     BIOPSY OF UTERUS LINING  3/2010    negative.      C C-SEC ONLY,PREV C-SEC      c-sec x 3      COLONOSCOPY  2/21/10    benign findings. advised 10 yr f/u.      FAMILY HISTORY  Family History   Problem Relation Age of Onset     CEREBROVASCULAR DISEASE Father      -stroke at age 80     Family History Negative Mother       Diedn her 80's of unknown causes.  Pt otherwise unaware of her health hx.      Unknown/Adopted Mother      DIABETES Brother      (Christian)  of DM complications at age 50.      Alcohol/Drug Brother      Christian     C.A.D. Brother      Christian.      HEART DISEASE Brother      Christian--MI age 50.     Family History Negative Brother      Family History Negative Brother      Family History Negative Sister      Family History Negative Sister      Family History Negative Son      Family History Negative Daughter      Family History Negative Daughter      SOCIAL HISTORY  Social History   Substance Use Topics     Smoking status: Former Smoker     Quit date: 8/2/1984     Smokeless tobacco: Never Used     Alcohol use No     MEDICATIONS  No current facility-administered medications for this encounter.      Current Outpatient Prescriptions   Medication     clotrimazole (LOTRIMIN) 1 % cream     DULoxetine (CYMBALTA) 60 MG EC capsule     gabapentin (NEURONTIN) 300 MG capsule     HYDROcodone-acetaminophen (NORCO) 5-325 MG per tablet     lisinopril (PRINIVIL/ZESTRIL) 5 MG tablet     order for DME     ALLERGIES  Allergies   Allergen Reactions     No Known Drug Allergies        I have reviewed the Medications, Allergies, Past Medical and Surgical History, and Social History in the Epic system.    Review of Systems   Musculoskeletal:        Positive for chronic bilateral leg pain. Positive for bilateral hip pain.    Neurological: Positive for weakness (in bilateral lower extremities at baseline).   Psychiatric/Behavioral: Negative for confusion. The patient is nervous/anxious.    All other systems reviewed and are negative.      Physical Exam   BP: 142/77  Heart Rate: 87  Temp: 97.7  F (36.5  C)  Resp: 16  Weight: 119.5 kg (263 lb 7.2 oz)  SpO2: 100 %      Physical Exam   Constitutional: No distress.   HENT:   Head: Atraumatic.   Mouth/Throat: Oropharynx is clear and moist. No oropharyngeal exudate.   Eyes: Pupils are equal, round, and reactive to light. No scleral icterus.   Cardiovascular: Normal heart sounds and intact distal pulses.    Pulmonary/Chest: Breath sounds normal. No respiratory distress.    Abdominal: Soft. Bowel sounds are normal. There is no tenderness.   Musculoskeletal: She exhibits no edema.        Right hip: She exhibits decreased range of motion and decreased strength.        Left hip: She exhibits decreased range of motion and decreased strength.        Lumbar back: She exhibits tenderness, bony tenderness and pain. She exhibits no swelling, no edema, no deformity and no spasm.        Back:    Skin: Skin is warm. No rash noted. She is not diaphoretic.       ED Course     ED Course     Procedures   10:57 AM  The patient was seen and examined by Dr. Barrera in Room 8.           Results for orders placed or performed during the hospital encounter of 04/30/18 (from the past 48 hour(s))   Lumbar spine XR, 2-3 views    Narrative    Exam: 2 views of the lumbar spine dated 4/30/2018.    COMPARISON: 2/16/2011.    CLINICAL HISTORY: Fall, evaluate for fracture.    FINDINGS: AP and lateral views of the lumbar spine were obtained. The  lateral view is suboptimal. No definite compression fractures in the  lumbar spine. Slight rightward slip of L4 in relation to L5.    Destructive changes in the right hip joint with severe osteoarthrosis  in both hips, better evaluated on the radiographs of the pelvis from  the same day.      Impression    IMPRESSION:  1. Poor visualization of the lumbar spine on the lateral view with  degenerative changes in the lumbar spine, as above.  2. Marked degenerative changes in both hip joints, please refer to the  radiographs of the pelvis from the same day for more complete  evaluation.    PETTY GAMEZ MD   XR Pelvis 1/2 Views    Narrative    Exam: Single frontal view of the pelvis dated 4/30/2018.    COMPARISON: 2/16/2011.    CLINICAL HISTORY: Fall.    FINDINGS: Single frontal view of the pelvis was obtained. The bones  are osteopenic appearing. Severe osteoarthrosis in both hip joints  with slight lateral displacement of the femoral heads in relation to  the acetabulum. There  appears to be marked collapse along the right  femoral head, not well evaluated on a single view.      Impression    IMPRESSION: Suboptimal evaluation of the bilateral hips, due to a  single view, however there are marked degenerative changes in both hip  joints with collapse of the right femoral head. If clinical concern  for acute fracture, additional views or MRI could be considered.    PETTY GAMEZ MD   CBC with platelets differential   Result Value Ref Range    WBC 12.4 (H) 4.0 - 11.0 10e9/L    RBC Count 4.82 3.8 - 5.2 10e12/L    Hemoglobin 14.3 11.7 - 15.7 g/dL    Hematocrit 43.4 35.0 - 47.0 %    MCV 90 78 - 100 fl    MCH 29.7 26.5 - 33.0 pg    MCHC 32.9 31.5 - 36.5 g/dL    RDW 14.1 10.0 - 15.0 %    Platelet Count 283 150 - 450 10e9/L    Diff Method Automated Method     % Neutrophils 80.9 %    % Lymphocytes 11.4 %    % Monocytes 6.9 %    % Eosinophils 0.4 %    % Basophils 0.2 %    % Immature Granulocytes 0.2 %    Nucleated RBCs 0 0 /100    Absolute Neutrophil 10.0 (H) 1.6 - 8.3 10e9/L    Absolute Lymphocytes 1.4 0.8 - 5.3 10e9/L    Absolute Monocytes 0.9 0.0 - 1.3 10e9/L    Absolute Eosinophils 0.1 0.0 - 0.7 10e9/L    Absolute Basophils 0.0 0.0 - 0.2 10e9/L    Abs Immature Granulocytes 0.0 0 - 0.4 10e9/L    Absolute Nucleated RBC 0.0    Comprehensive metabolic panel   Result Value Ref Range    Sodium 142 133 - 144 mmol/L    Potassium 3.8 3.4 - 5.3 mmol/L    Chloride 108 94 - 109 mmol/L    Carbon Dioxide 24 20 - 32 mmol/L    Anion Gap 10 3 - 14 mmol/L    Glucose 113 (H) 70 - 99 mg/dL    Urea Nitrogen 27 7 - 30 mg/dL    Creatinine 0.58 0.52 - 1.04 mg/dL    GFR Estimate >90 >60 mL/min/1.7m2    GFR Estimate If Black >90 >60 mL/min/1.7m2    Calcium 9.0 8.5 - 10.1 mg/dL    Bilirubin Total 0.6 0.2 - 1.3 mg/dL    Albumin 3.8 3.4 - 5.0 g/dL    Protein Total 7.6 6.8 - 8.8 g/dL    Alkaline Phosphatase 100 40 - 150 U/L    ALT 30 0 - 50 U/L    AST 24 0 - 45 U/L            Labs Ordered and Resulted from Time of ED  Arrival Up to the Time of Departure from the ED   CBC WITH PLATELETS DIFFERENTIAL - Abnormal; Notable for the following:        Result Value    WBC 12.4 (*)     Absolute Neutrophil 10.0 (*)     All other components within normal limits   COMPREHENSIVE METABOLIC PANEL - Abnormal; Notable for the following:     Glucose 113 (*)     All other components within normal limits            Assessments & Plan (with Medical Decision Making)   72-year-old female with long-standing history of bilateral leg pain and weakness presents after fall with lower back and hip pain.  Differential includes fracture, contusion, chronic pain, other.  Examination revealed discomfort with any type of movement of her hips though no evidence of shortening, point specific tenderness, rotational deformity or other.  Vital signs were entirely unremarkable with exception of slightly elevated blood pressure.  Laboratories were obtained including CBC with slightly elevated white blood count at 12.4 consistent with stress response.  Comprehensive metabolic panel revealed slightly elevated glucose 113 which was otherwise normal again consistent with stress response.  X-ray of pelvis and lumbar spine revealed osteopenia but no evidence of obvious fractures.  Old records were reviewed revealing that the patient has had extensive workup for her bilateral lower extremity pain weakness including EMG, neurologic exam, and is being followed closely by primary physician.  Patient desired admission to further workup this five-year process.  The case was discussed at length with the social work as patient is having difficulty with functioning at home.  Coordination of further services occurred in the emergency room.  Patient has an appointment with pain clinic in 2 days which she was encouraged to attend.  Also encouraged her to follow-up closely with her primary physician.    I have reviewed the nursing notes.    I have reviewed the findings, diagnosis, plan and  need for follow up with the patient.    New Prescriptions    No medications on file       Final diagnoses:   Leg weakness, bilateral   Bilateral leg pain   Fall, initial encounter     I, Jw Mcgee, am serving as a trained medical scribe to document services personally performed by Leo Barrera MD, based on the provider's statements to me.      Leo SANCHEZ MD, was physically present and have reviewed and verified the accuracy of this note documented by Jw Mcgee.     4/30/2018   [facility], [place], EMERGENCY DEPARTMENT     Diony Barrera MD  04/30/18 [id number]

## 2018-04-30 NOTE — PROGRESS NOTES
"Emergency Social Work Services Note    Date of  Intervention: 04/30/18  Last Emergency Department Visit:  02/08/2018  Care Plan:  None  Collaborated with:  MD, bedside RN, patient's clinic coordinator Francisca ([phone])    Data:  SW consulted for 72 year old woman in regard to ED visit and community support and help with discharge plan.     Intervention:  SW reviewed patient's chart and spoke with care coordinator Francisca at primary clinic to collaborate for discharge planning. Francisca states she has been working with patient recently with transportation issues for outpatient appointments. Patient legs are reported to be very painful getting in and out of a car. SW went to speak with patient in regard to ED visit and discharge plan.     Assessment:  SW met with patient, patient was laying in bed and was very tearful. She expressed frustration about her leg pain and the fact that \"we haven't figured out why my legs hurt\". SW discussed PCP visits and patient's work with Francisca at the clinic and patient states that she would prefer to go home and that she has been working with Francisca for outpatient appointments, but her legs have been \"hurting me so badly, that Leo feels that he can't take me in his car anymore\". Patient stated that she applied for [employer], but that it would be \"too many transfers to get to my appointments\".     Patient was open to [facility] PT/OT/SW services recently, but was discontinued due to limited participation with pain. SW discussed potential referral to TCU. Patient expressed interest, but also stated that she would rather discharge home today because \"I have everything set up how I can like it, and I can get to the bathroom. I can't sleep in a hospital bed\". Patient was fitted for a manual wheelchair by home OT, and she states that it will be delivered in 6 months. Patient enjoyed working with Az Suarez and other home care team. She has a [facility]" Screening  coming to her home on May 10th and expressed a need to be home for that assessment for more services at home.     TONY discussed some private pay transportation services and resources that would benefit patient for outpatient appointments. She was agreeable to those resources. TONY explained that perhaps patient can use these specialized transportation private pay resources for her appointments with neurology or PCP. She was comfortable with following up with these resources. TONY paged [facility] to assist with making home care referral.    TONY notified Francisca of discharge plan home 4/30/2018. [employer] transportation was arranged by JEEVAN Frazier.    TONY notified ED provider as well. TONY encouraged patient to connect with Francisca, transportation services and setting up an appointment with PCP for hospital DC follow up and to discuss pain medications.     Plan:    Anticipated Disposition:  Home with services: [facility] PT/OT/RN/TONY.    Barriers to d/c plan:  None    Follow Up:  Patient to follow up with Francisca at clinic, follow up with transportation resources for community appointments. Follow up with PCP and try for outpatient appointment and have home care call for [facility].    Cele JIMENEZ  4/30/2018 2:13 PM

## 2018-04-30 NOTE — ED AVS SNAPSHOT
[facility], Emergency Department    [address] [address]    Phone:  [phone]                                       Cristy Bailey   MRN: [id number]    Department:  [facility], Emergency Department   Date of Visit:  4/30/2018           Patient Information     Date Of Birth          1945        Your diagnoses for this visit were:     Leg weakness, bilateral     Bilateral leg pain     Fall, initial encounter        You were seen by Diony Barrera MD.      Follow-up Information     Follow up with Manoj Daley PA-C.    Specialty:  Physician Assistant    Contact information:    [address] [facility] [address]  [phone]        24 Hour Appointment Hotline       To make an appointment at any [facility] clinic, call [phone] ([phone]). If you don't have a family doctor or clinic, we will help you find one. [facility] clinics are conveniently located to serve the needs of you and your family.          ED Discharge Orders     Home Care OT Referral for Hospital Discharge       OT to eval and treat    Your provider has ordered home care - occupational therapy. If you have not been contacted within 2 days of your discharge please call the department phone number listed on the top of this document.    _______________________  [facility]  Phone  [phone]  Fax  [phone]  ______________________            Home Care PT Referral for Hospital Discharge       PT to eval and treat    Your provider has ordered home care - physical therapy. If you have not been contacted within 2 days of your discharge please call the department phone number listed on the top of this document.    _______________________  [facility]  Phone  [phone]  Fax  [phone]  ______________________            Home Care Social Service Referral for Hospital Discharge        to assess pt at home.     Your provider has ordered home care - . If you  have not been contacted within 2 days of your discharge please call the department phone number listed on the top of this document.    _______________________  [facility]  Phone  [phone]  Fax  [phone]  ______________________            Home care nursing referral       _______________________  [facility]  Phone  [phone]  Fax  [phone]  ______________________      RN skilled nursing visit. RN to assess vital signs and weight, respiratory and cardiac status, incision for signs/symptoms of infection, hydration, nutrition and bowel status and home safety.  RN to teach medication management.  RN to provide n/a.    Your provider has ordered home care nursing services. If you have not been contacted within 2 days of your discharge please call the inpatient department phone number at [phone] .                     Review of your medicines      Our records show that you are taking the medicines listed below. If these are incorrect, please call your family doctor or clinic.        Dose / Directions Last dose taken    clotrimazole 1 % cream   Commonly known as:  LOTRIMIN   Quantity:  15 g        APPLY TO AFFECTED AREA TOPICALLY 2 TIMES A DAY.   Refills:  0        DULoxetine 60 MG EC capsule   Commonly known as:  CYMBALTA   Dose:  60 mg   Quantity:  30 capsule        Take 1 capsule (60 mg) by mouth daily   Refills:  2        gabapentin 300 MG capsule   Commonly known as:  NEURONTIN   Dose:  300 mg   Quantity:  180 capsule        Take 1 capsule (300 mg) by mouth 2 times daily   Refills:  0        HYDROcodone-acetaminophen 5-325 MG per tablet   Commonly known as:  NORCO   Dose:  1 tablet   Quantity:  10 tablet        Take 1 tablet by mouth At Bedtime   Refills:  0        lisinopril 5 MG tablet   Commonly known as:  PRINIVIL/ZESTRIL   Dose:  5 mg   Quantity:  90 tablet        Take 1 tablet (5 mg) by mouth daily   Refills:  1        order for DME   Quantity:  2 Package        Equipment being  ordered: Compression stockings 20-30mmHg   Refills:  0                Procedures and tests performed during your visit     CBC with platelets differential    Comprehensive metabolic panel    Lumbar spine XR, 2-3 views    XR Pelvis 1/2 Views      Orders Needing Specimen Collection     None      Pending Results     No orders found from 4/28/2018 to 5/1/2018.            Pending Culture Results     No orders found from 4/28/2018 to 5/1/2018.            Pending Results Instructions     If you had any lab results that were not finalized at the time of your Discharge, you can call the ED Lab Result RN at [phone]. You will be contacted by this team for any positive Lab results or changes in treatment. The nurses are available 7 days a week from 10A to 6:30P.  You can leave a message 24 hours per day and they will return your call.        Thank you for choosing [facility]       Thank you for choosing [facility] for your care. Our goal is always to provide you with excellent care. Hearing back from our patients is one way we can continue to improve our services. Please take a few minutes to complete the written survey that you may receive in the mail after you visit with us. Thank you!        [employer] Information     [employer] gives you secure access to your electronic health record. If you see a primary care provider, you can also send messages to your care team and make appointments. If you have questions, please call your primary care clinic.  If you do not have a primary care provider, please call [phone] and they will assist you.        Care EveryWhere ID     This is your Care EveryWhere ID. This could be used by other organizations to access your [facility] medical records  [id number]        Equal Access to Services     MICHELLE WISE : Hadii braden guevarao Sosusie, waaxda luqadaha, qaybta kaalmalarry emery. So [facility] [phone].    ATENCIÓN: ravin Mata  disposición servicios gratuitos de asistencia lingüística. Adrienne al [phone].    We comply with applicable federal civil rights laws and [place] laws. We do not discriminate on the basis of race, color, national origin, age, disability, sex, sexual orientation, or gender identity.            After Visit Summary       This is your record. Keep this with you and show to your community pharmacist(s) and doctor(s) at your next visit.

## 2018-05-01 ENCOUNTER — DOCUMENTATION ONLY (OUTPATIENT)
Dept: FAMILY MEDICINE | Facility: CLINIC | Age: 73
End: 2018-05-01

## 2018-05-01 ENCOUNTER — TELEPHONE (OUTPATIENT)
Dept: FAMILY MEDICINE | Facility: CLINIC | Age: 73
End: 2018-05-01

## 2018-05-01 ENCOUNTER — DOCUMENTATION ONLY (OUTPATIENT)
Dept: CARE COORDINATION | Facility: CLINIC | Age: 73
End: 2018-05-01

## 2018-05-01 ENCOUNTER — PATIENT OUTREACH (OUTPATIENT)
Dept: CARE COORDINATION | Facility: CLINIC | Age: 73
End: 2018-05-01

## 2018-05-01 RX ORDER — HYDROCODONE BITARTRATE AND ACETAMINOPHEN 5; 325 MG/1; MG/1
1 TABLET ORAL EVERY 6 HOURS PRN
Qty: 4 TABLET | Refills: 0 | Status: SHIPPED | OUTPATIENT
Start: 2018-05-01 | End: 2018-05-03

## 2018-05-01 NOTE — TELEPHONE ENCOUNTER
Please call Cristy. I am ok with a small amount to help with today until transitioned to TCU. Someone will have to pick this Rx up though. We can leave the Rx up front. The Rx is in my OB.

## 2018-05-01 NOTE — PROGRESS NOTES
Agree and will sign orders for transition to TCU.   Please send with H and P (last few OV notes), recent ED notes, medication list and face sheet to Admission at [facility] intake, fax number is [phone].  Please let me forward a note to Francisca Hernandez when complete and she'll follow up with SW at [facility].

## 2018-05-01 NOTE — PROGRESS NOTES
Verbal order needed for one time RN visit on 5/1/18.        SUMMARY TO MD  72 yr old female with referral from [facility] ER following a fall in pts home while trying to transfer into the bathroom. Pt was evaluated and found to have no fractures on xray. Pt was sent home after prolonged ER stay due to pain and need to meet with the hosptial SW.  [facility] referral was sent. RN met pt in her home, her sig other left as soon as RN arrived.  Pt sitting in large recliner rubbing her lower leg.  Pt became very tearful quickly into RN assessment, often times so hysterical she was unable to communicate.  Pt feels that nobody believes her regarding her pain and she can not get any doctors to prescribe her any pain medication. Pt staets all she thinks she needs is tylneol 3 as she does not like taking pills.  It was quickly determined that pt was in no condition to be living with minimal support in her home. She does have live in sig. other who is available, however due to her increasingly progressive decline he is no longer able to provide the level of care she needs.  Pt reports she is having severe difficluty transfering to her wheelchair in order to be brought to the bathroom and when she gets in it is so painful she is fearful of falling. Pt appeared well groomed however home/chair smelt of incontinent urine.  Pt reports pain 10/10 at times, 8/10 at best without movement. Pt does state she is /taking way too much tylneol/ and is worried she is going to take too much.  Pt did not report exactly how much she was taking, but educated on not taking more than 6 tablets/3000mg/24 hours, pt also has ibuprofen but reports only taking 1 to 2 tabs daily but nothing helps current pain.  Pt reports she had a postive lymes titer 8 years ago. She has a referral to see infectious disease which wwas scheduled for 5/2 hower pt had SW at ER reschedule this due to transportation/pain issues, so now rescheduled for 5/30/18.  Pt and RN went  round and round about home safety and pt agreed to TCU placement.  Call placed to Francisca,  [facility] care coordinator LSW who agreed to work on TCU Placement. [facility] were pts top three. Received confirmation during visit that [facility] in [place] had shared room available on 5/2/18.  Pt with a lot of anxiety about sleep, how to get there, and how to manage pain prior to move.  Pt is waiting to possibly have phone apt with KRISH Blum at [facility] for possible pain management, this was pending upon RN leaving pts home.  Pt reports sig. other can provide 24 hour cares/supervision until pt is able to report to TCU for further cares/management.    Pt verbalizes understanding of one time only home care visit due to admission to TCU next day. Pt verbalizes proper tylenol/iburpofen dosing, she verbalizes understanding to call 911 if pain is so severe she is unable to stay safely in her own home until 5/2/18.  Pt verbalizes understanding that if something changes and pt does not go to TCU Francisca will contact  Homecare with new referral.   One time only visit.   Duyen Francis RN, BSN  [email or web address]  [phone]

## 2018-05-01 NOTE — TELEPHONE ENCOUNTER
Called Francisca back.  She is looking on getting some help on getting paperwork to get her to [facility] in [place] tomorrow.  There is a smartset that Kulwant can use - sniff admission.      She would need Kulwant Daley to make sure the H&P is up to date, med list is correct.      She could also work with Jasmyne Masters on the smartset.      Kulwant - please call Francisca at the number below.      Will forward to Kulwant Daley.

## 2018-05-01 NOTE — PROGRESS NOTES
Received a call from April of  home Care [phone].    She does not think being in her home is the best right now - pt agrees.  Francisca Hernandez,  [phone] is going to help looking for placement at [facility] for TCU placement for pain management for ADLs.  She has had chronic pain.  She is to work with ID - has a positive lymes titer.  She fell yesterday.  She was sent home.  She was referred to Home Care.  Does she need to be seen if they need an H&P for placement?  They are just doing the 1 time admission for now, Francisca is going to work on placement.      Will forward to Kulwant Daley.  Please call April if needed.

## 2018-05-01 NOTE — PROGRESS NOTES
Clinic Care Coordination Contact    OUTREACH    Referral Information:  Referral Source: Home Care    Chief Complaint   Patient presents with     Clinic Care Coordination - Follow-up        Universal Utilization: Can't use regular car to get into clinic. Has resources for medical transportation to hire privately.   Utilization    Last refreshed: 4/30/2018  4:37 PM:  No Show Count (past year) 0       Last refreshed: 4/30/2018  4:37 PM:  ED visits 2       Last refreshed: 4/30/2018  4:37 PM:  Hospital admissions 0          Current as of: 4/30/2018  4:37 PM           Clinical Concerns:  Current Medical Concerns:  Does not have any pain medication to help with the acute pain from her fall yesterday and from her chronic pain.  She has messaged KRISH Thomson and he requests her to come into clinic for OV.  She got one pain medication before she left the ED to help with pain she would experience during the transportation home. Was not able to sleep last night. Her partner stayed home from work today as she does not feel stable on her feet. Her legs are weak.      Current Behavioral Concerns: Anxious and upset that she does not have any prescription pain options.     Education Provided to patient: Reviewed her conversation with ED SW yesterday and the option to go to TCU. Patient does not want to go to TCU in her area as it had a bad review and after her experience in ED yesterday, where she didn't get prompt help when she used the call button, she wants to stay home.        Health Maintenance Reviewed:  Not reviewed during this call.     Medication Management:  Not reviewed during call. The home care RN is coming this morning and will do med reconciliation.  She wants some type of pain medications.  She will work with RN to address her current pain.      Functional Status:     Equipment Currently Used at Home: walker, standard, wheelchair, manual  Transportation:   Her partner has a car, but she cannot get into it without  significant pain. She has [employer], but it is not easy to use as it would require multiple transfers.  She got a list of private pay medical transport options to call.      Psychosocial: Will be getting home care, PT, OT, SW and RN starting today.  CC encouraged her to consider going into TCU for rehab as she would have additional help other than her partner.      Financial/Insurance: [facility].  [place] 10th.      Resources and Interventions:  Current Resources: Skilled Nursing, Home Health Aid, Physicial Therapy, Occupational Therapy, social work;  ([place] 10)     Referrals Placed: Community Resources     Goals:   Goals        General    Pain Management (pt-stated)     Notes - Note created  4/5/2018  2:53 PM by Francisca Hernandez LSW    Goal Statement: I will have less pain.  Measure of Success: Will have intervention that addresses my pain.   Supportive Steps to Achieve: CC will contact PCP to discuss patient concerns and next steps.   Barriers: Cannot leave the house without extreme effort. Is having extreme pain and does not have any sense that it will get better. Her  is her caregiver and he is frustrated with her pain.  Strengths: Has  who supports her.    Date to Achieve By: 9-1-2018        No progress. Went to ED yesterday at [facility] as the EMS who picked her up suggested that they have doctors there who would know what to do. She was frustrated that they would not admit her or give her any pain medications.  She was in ED for 8 hours and it was a horrid experience. They did not have a commode and told her to urinate in her bed.     Patient/Caregiver understanding: She will work with  home care to get in home assessment.  Her intermed appointment was moved to the end of May.  She will work with PA to address her pain.      Plan: CC to work with home care  to address her needs and coordinate  care.  CC to contact CM in two days.     Francisca Hernandez,   [facility]  [email or web address]  [phone]    Clinic Care Coordination Contact  Care Team Conversations  Call from April, [facility] nurse. She is with patient and patient cannot stay at home in this condition and has agreed to going to TCU.  Patient is open to going to [facility] in  or [facility].  She understands that she will need to have transportation and will set that up herself when plan is made.      Called [facility] and they do have a shared room for female open tomorrow.  They need face sheet with SSN and insurance information, H and P, recent labs and medication list faxed to them at [phone].     Called clinic and  asking for a call back from PA's nurse to discuss admission needs.  Called patient and April is still there. She has talked to nurse at clinic about having a phone call to discuss her need for medications.      Call back from nurse for PA.  [facility]  She wants CC to talk to PA directly and will have him call CC.    CC reviewed that CC could use smart set Outpatient admission to SNF      Communicate with patient about the plan.  Talked to KRISH Daley and he is in agreement about TCU while patient is unable to be alone.  Sent PA smart set with orders for SNF admission for signature. He will work with staff to send packet to [facility].  CC asked to be notified when completed so CC can F/U with TCU.       PA is willing to help with short term pain medications.  CC asked PA to call her to discuss her options.     Got notification that paperwork was faxed to U. Called TCU and they need face sheet, but other paperwork. Asked MARIA DEL CARMEN Qureshi to fax face sheet and got confirmation it was faxed. Called patient and left this information on her voice mail.     Plan-Contact patient tomorrow to discuss her transition to TCU if approved by [facility].   Francisca  Mary   [facility]  [email or web address]  [phone]

## 2018-05-01 NOTE — TELEPHONE ENCOUNTER
Francisca-care coordinator for patient called. Patient has agreed to go into TCU and Francisca would like a call back in order to set patient up for a location that has an opening.     Please call Francisca back at [phone].    Thank you

## 2018-05-01 NOTE — TELEPHONE ENCOUNTER
Will forward to Kulwant Daley for his review also.      Pt in the ER yesterday, per ER note, pt has appt with the pain clinic tomorrow?

## 2018-05-02 NOTE — PROGRESS NOTES
Clinic Care Coordination Contact  Care Team Conversations  Call from patient who slept better last night and had questions about pain medications while in TCU. Explained that KRISH Francois will be in charge of her prescriptions while inpatient and she can work with the nurse there to address her needs.  She needs to set up a ride this morning in order to get a ride today. Provided her with the phone number of admissions at [facility] to check on progress.      She asked if she is not going to get out of TCU. CC explained that she is going to TCU which means rehab, not long term care. She will work hard to rehab and return home.     She [place]. CC will assist in rescheduling the assessment if she will be in TCU on scheduled date of May 10th.      Call from admissions at [facility]. She read the information and has concerns as patient has pain 10 out of 10 and they do not accept patients with uncontrolled pain.  Dialogued with her that she has not had pain medications other than OTC's for past two months and was given some yesterday from PCP to help with acute pain from the recent fall.  Discussed patient's rehab potential and CC clarified that patient wants to get strong to return to home and is not drug seeking. They would like the PCP to prescribe her pain medications and give her a 30 day prescription.  She will review admission request with her supervisor.  Patient has talked to them this morning and was crying during the call.  They have several other forms that need to be faxed to clinic for PCP completion and CC provided the fax number.  If they decide to accept her, CC will complete PAS.      Sent message to PCP asking about a pain medication management during TCU admission.    [facility].  Called patient and informed her. She would like to try [facility]'s TCU in [place]. Her niece works there.  She will  think of other options if this TCU cannot take her.  Leo is home from work again today helping her.     Call to . [facility]'s and left a message for Dipika in intake.     Plan- work with admissions to determine if patient can enter TCU.     Plan- CC will assist with admission to TCU and with coordinating care while in TCU.  Francisca Hernandez,   [facility]  [email or web address]  [phone]

## 2018-05-03 ENCOUNTER — PATIENT OUTREACH (OUTPATIENT)
Dept: CARE COORDINATION | Facility: CLINIC | Age: 73
End: 2018-05-03

## 2018-05-03 ENCOUNTER — VIRTUAL VISIT (OUTPATIENT)
Dept: FAMILY MEDICINE | Facility: CLINIC | Age: 73
End: 2018-05-03
Payer: COMMERCIAL

## 2018-05-03 DIAGNOSIS — M79.605 BILATERAL LEG PAIN: ICD-10-CM

## 2018-05-03 DIAGNOSIS — M79.604 BILATERAL LEG PAIN: ICD-10-CM

## 2018-05-03 PROCEDURE — 98968 PH1 ASSMT&MGMT NQHP 21-30: CPT | Performed by: PHYSICIAN ASSISTANT

## 2018-05-03 RX ORDER — HYDROCODONE BITARTRATE AND ACETAMINOPHEN 5; 325 MG/1; MG/1
1 TABLET ORAL AT BEDTIME
Qty: 30 TABLET | Refills: 0 | Status: SHIPPED | OUTPATIENT
Start: 2018-05-03 | End: 2018-06-01

## 2018-05-03 NOTE — MR AVS SNAPSHOT
"              After Visit Summary   5/3/2018    Cristy Bailey    MRN: [id number]           Patient Information     Date Of Birth          1945        Visit Information        Provider Department      5/3/2018 12:00 PM Manoj Daley PA-C [facility]        Today's Diagnoses     Bilateral leg pain           Follow-ups after your visit        Your next 10 appointments already scheduled     May 10, 2018  9:00 AM CDT   New Visit with Kris Freeman MD   [facility])    [address] [address] [facility]  [address] [place] [id number]   [phone]              Who to contact     If you have questions or need follow up information about today's clinic visit or your schedule please contact [facility] directly at [phone].  Normal or non-critical lab and imaging results will be communicated to you by MyChart, letter or phone within 4 business days after the clinic has received the results. If you do not hear from us within 7 days, please contact the clinic through MyChart or phone. If you have a critical or abnormal lab result, we will notify you by phone as soon as possible.  Submit refill requests through [employer] or call your pharmacy and they will forward the refill request to us. Please allow 3 business days for your refill to be completed.          Additional Information About Your Visit        MyChart Information     [employer] lets you send messages to your doctor, view your test results, renew your prescriptions, schedule appointments and more. To sign up, go to [email or web address] . Click on \"Log in\" on the left side of the screen, which will take you to the Welcome page. Then click on \"Sign up Now\" on the right side of the page.     You will be asked to enter the access code listed below, as well as some personal information. Please follow the directions to create your username and password.     Your access code " is: [email or web address]  Expires: 2018  2:36 PM     Your access code will  in 90 days. If you need help or a new code, please call your [place] clinic or [phone].        Care EveryWhere ID     This is your Care EveryWhere ID. This could be used by other organizations to access your [place] medical records  [id number]         Blood Pressure from Last 3 Encounters:   18 (!) 134/111   18 130/74   18 134/72    Weight from Last 3 Encounters:   18 263 lb 7.2 oz (119.5 kg)   18 250 lb (113.4 kg)   17 258 lb 8 oz (117.3 kg)              Today, you had the following     No orders found for display         Today's Medication Changes          These changes are accurate as of 5/3/18 11:59 PM.  If you have any questions, ask your nurse or doctor.               These medicines have changed or have updated prescriptions.        Dose/Directions    HYDROcodone-acetaminophen 5-325 MG per tablet   Commonly known as:  NORCO   This may have changed:    - when to take this  - reasons to take this   Used for:  Bilateral leg pain   Changed by:  Manoj Daley PA-C        Dose:  1 tablet   Take 1 tablet by mouth At Bedtime   Quantity:  30 tablet   Refills:  0         Stop taking these medicines if you haven't already. Please contact your care team if you have questions.     DULoxetine 60 MG EC capsule   Commonly known as:  CYMBALTA   Stopped by:  Manoj Daley PA-C                Where to get your medicines      Some of these will need a paper prescription and others can be bought over the counter.  Ask your nurse if you have questions.     Bring a paper prescription for each of these medications     HYDROcodone-acetaminophen 5-325 MG per tablet               Information about OPIOIDS     PRESCRIPTION OPIOIDS: WHAT YOU NEED TO KNOW   You have a prescription for an opioid (narcotic) pain medicine. Opioids can cause addiction. If you have a history of chemical dependency of any  type, you are at a higher risk of becoming addicted to opioids. Only take this medicine after all other options have been tried. Take it for as short a time and as few doses as possible.     Do not:    Drive. If you drive while taking these medicines, you could be arrested for driving under the influence (DUI).    Operate heavy machinery    Do any other dangerous activities while taking these medicines.     Drink any alcohol while taking these medicines.      Take with any other medicines that contain acetaminophen. Read all labels carefully. Look for the word  acetaminophen  or  Tylenol.  Ask your pharmacist if you have questions or are unsure.    Store your pills in a secure place, locked if possible. We will not replace any lost or stolen medicine. If you don t finish your medicine, please throw away (dispose) as directed by your pharmacist. The [place] Pollution Control Agency has more information about safe disposal: https://www.pca.[facility].[place].us/living-green/managing-unwanted-medications    All opioids tend to cause constipation. Drink plenty of water and eat foods that have a lot of fiber, such as fruits, vegetables, prune juice, apple juice and high-fiber cereal. Take a laxative (Miralax, milk of magnesia, Colace, Senna) if you don t move your bowels at least every other day.          Primary Care Provider Office Phone # Fax #    Manoj Daley PA-C [phone]       [address]  [facility] [address]        Goals        General    Pain Management (pt-stated)     Notes - Note created  4/5/2018  2:53 PM by Francisca Hernandez LSW    Goal Statement: I will have less pain.  Measure of Success: Will have intervention that addresses my pain.   Supportive Steps to Achieve: CC will contact PCP to discuss patient concerns and next steps.   Barriers: Cannot leave the house without extreme effort. Is having extreme pain and does not have any sense that it will get better. Her  is her  caregiver and he is frustrated with her pain.  Strengths: Has  who supports her.    Date to Achieve By: 9-1-2018        Equal Access to Services     MICHELLE WISE : Hadii aad ku hadbebetodaysi Isabelsusie, modestocarrie hulltitaha, elsi mendez, larry julioin hayaasegun crabtreeyamilet radhamarilynbenjamin camp. So [facility] [phone].    ATENCIÓN: Si habla español, tiene a pompa disposición servicios gratuitos de asistencia lingüística. Llame al [phone].    We comply with applicable federal civil rights laws and [place] laws. We do not discriminate on the basis of race, color, national origin, age, disability, sex, sexual orientation, or gender identity.            Thank you!     Thank you for choosing [facility]  for your care. Our goal is always to provide you with excellent care. Hearing back from our patients is one way we can continue to improve our services. Please take a few minutes to complete the written survey that you may receive in the mail after your visit with us. Thank you!             Your Updated Medication List - Protect others around you: Learn how to safely use, store and throw away your medicines at www.disposemymeds.org.          This list is accurate as of 5/3/18 11:59 PM.  Always use your most recent med list.                   Brand Name Dispense Instructions for use Diagnosis    gabapentin 300 MG capsule    NEURONTIN    180 capsule    Take 1 capsule (300 mg) by mouth 2 times daily    Weakness of both lower extremities, Bilateral leg pain       HYDROcodone-acetaminophen 5-325 MG per tablet    NORCO    30 tablet    Take 1 tablet by mouth At Bedtime    Bilateral leg pain       lisinopril 5 MG tablet    PRINIVIL/ZESTRIL    90 tablet    Take 1 tablet (5 mg) by mouth daily    HTN, goal below 140/90       order for DME     2 Package    Equipment being ordered: Compression stockings 20-30mmHg    Lower extremity edema

## 2018-05-03 NOTE — PROGRESS NOTES
"Cristy Bailey is a 72 year old female who is being evaluated via a telephone visit.      The patient has been notified of following (by ZOE Appiah MA     \"We have found that certain health care needs can be provided without the need for a physical exam.  This service lets us provide the care you need with a short phone conversation.  If a prescription is necessary we can send it directly to your pharmacy.  If lab work is needed we can place an order for that and you can then stop by our lab to have the test done at a later time.    This telephone visit will be conducted via 3 way call with the you (the patient) , the physician/provider, and a me all on the line at the same time.  This allows your physician/provider to have the phone conversation with you while I will be taking notes for your medical record.  We will have full access to your [place] medical record during this entire phone call.    Since this is like an office visit,  will bill your insurance company for this service.  Please check with your medical insurance if this type of telephone/virtual is covered . You may be responsible for the cost of this service if insurance coverage is denied.  The typical cost is $30 (10min), $59(11-20min) and $85 (21-30min)     If during the course of the call the physician/provider feels a telephone visit is not appropriate, you will not be charged for this service\"    Consent has been obtained for this service by care team member: yes.  See the scanned image in the medical record.    S: The history as provided by the patient to the provider during this 3 way call include     Pertinent parts of the the patient's medical history reviewed and confirmed by the provider included : see below    Total time of call between patient and provider was 30+ minutes     Kulwant Daley (MD signature)  ===================================================    I have reviewed the note as documented above.  This accurately captures " "the substance of my conversation with the patient,    Additional provider notes:  I called Cristy lomeli to discuss her ongoing situation/symptoms  She notes that the pain from her fall has steadily improved; however there is a continuation of pain in the legs, also now she is having pain in the arms with bilateral numbness  Sometimes the pain is improved talking to people, or at least helps her get through the painful moments  She is still interested in the TCU option or other home care option but her MAIN goal is to see infectious disease - she is \"convinced this is all related to lyme's disease\".   Her current live in friend has now quit his job to help with transportation.   She is hesitant to partake in other therapies/evaluations until infectious disease but felix encouraged her to do these.  She is having difficulty sleeping 2/2 pain. Wondering about a sleeping pill?  She is feeling overwhelmed but denies any suicidal ideation      Assessment/Plan:  Bilateral leg pain    I am hesitant to start Cristy on daily pain medication however given the persistent exacerbation and her pain, as well as problems sleeping, we'll start having her take one tablet before bedtime. I discussed with her that I was hesitant to start both pain and sleep medications. She verbalized understanding. I have additionally stressed that in the meantime, prior to her infectious disease appt, I would like to keep digging into the cause of her pain. I would really like her to see ortho as well. 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 Perhaps this is at least contributory? Lumbar spine as well? (although per her report there was no real EMG abnormality - though we've not seen the notes). She'll continue to work with CC as well to help with any cocnerns. Finally we briefly discussed the possibility of seeing pain management, though without a diagnosis I find this less palatable.             "

## 2018-05-03 NOTE — PROGRESS NOTES
Clinic Care Coordination Contact    Situation: Patient chart reviewed by care coordinator.    Background: Patient fell on Monday and went to ED and was discharged to home with home care. Home care RN assessed her on Tuesday, and patient is not able to ambulate independently and needs 24-7 care.  She agreed to move to TCU and CC was assisting with move to [facility]. TCU declined admission on Wednesday due to pain patient is experiencing.  Patient's partner Leo has not gone to work this week to provide her care.  She [place] [employer] set [place] 10th.  Patient is not able to get into a car to go to appointments at this time. She has [employer] but is outside the service zone and would have to get to  to use the service.  She has intermed appointment on May 30th.  [facility]  Patient thinks she has Lymes disease that is causing her severe leg pain and hand numbness.      Assessment: Talked to patient to determine a plan.  KRISH Francois can do a telephone visit and patient will call to set that up.  CC reviewed options with patient and she does not think that a TCU is right as she is in so much pain, she would not be able to participate in therapies.  Currently, she is scared to transfer herself from her chair to her wheelchair and on and off the toilet. Leo is standing behind her when she transfers from her chair to the .  He has his arm around her back when getting off the toilet.  KRISH Francois recommended that she have an orthopedic exam. She does not think the pain is orthopedic. She does not have any back pain.      CC offered these options: CC to help with transition to care center in the non TCU section. Patient understands that this is not covered by Medicare and she would likely need to have MA application.      Stay at [place] and have [place] [employer] and get services set up for care at home.    Restart [facility] care to work with  therapies to assess her transfers and to train on safety with transfers.     Get appointment with pain clinic to investigate other interventions to handle her pain. She was open to discussing all of this with KRISH Francois.      Addendum 5-4:  Patient had virtual visit with KRISH Daley yesterday.      Called patient.  Her partner Leo has quit his job to stay with her and care for her.  She did get a prescription for pain medication which Leo picked up today. She will use it to help her sleep.  Discussed next steps.  Since Leo is not working, she does not want to go to a care center. She will meet with [facility] assessment to see if she qualifies for waivered services. Educated on how this program works.  She asked about being on a waiting list for a desired care center if she needs that care. CC recommended that when she needs that level of care, that is the time to seek it out.  She does not want to be in a care center.  KRISH Daley wants her to have orthopedic exam as well.  She is able to get into a car now that she has pain medication so Leo will be able to drive her to appointments.  Clarified that CC will continue to work with her until she has assessment.  If she gets on a waiver program, she will also have a  to assist her.  She does not know if her legs and hands will ever improve.     Plan/Recommendations: Patient agrees to call CC if needs arise prior to next outreach on May 14th.      Francisca Hernandez,   [facility]  [email or web address]  [phone]

## 2018-05-04 ENCOUNTER — MYC MEDICAL ADVICE (OUTPATIENT)
Dept: FAMILY MEDICINE | Facility: CLINIC | Age: 73
End: 2018-05-04

## 2018-05-04 NOTE — TELEPHONE ENCOUNTER
Please see patient message concerning pain control. Has Norco and gabapentin on med list.    OTC pain patches? lidoderm patches?    Please advise.

## 2018-05-10 ENCOUNTER — OFFICE VISIT (OUTPATIENT)
Dept: ORTHOPEDICS | Facility: CLINIC | Age: 73
End: 2018-05-10
Payer: COMMERCIAL

## 2018-05-10 ENCOUNTER — TELEPHONE (OUTPATIENT)
Dept: ORTHOPEDICS | Facility: CLINIC | Age: 73
End: 2018-05-10

## 2018-05-10 VITALS
WEIGHT: 263 LBS | DIASTOLIC BLOOD PRESSURE: 80 MMHG | HEIGHT: 66 IN | BODY MASS INDEX: 42.27 KG/M2 | SYSTOLIC BLOOD PRESSURE: 140 MMHG

## 2018-05-10 VITALS
HEIGHT: 66 IN | DIASTOLIC BLOOD PRESSURE: 80 MMHG | WEIGHT: 263 LBS | BODY MASS INDEX: 42.27 KG/M2 | SYSTOLIC BLOOD PRESSURE: 140 MMHG

## 2018-05-10 DIAGNOSIS — R29.898 WEAKNESS OF BOTH LOWER EXTREMITIES: ICD-10-CM

## 2018-05-10 DIAGNOSIS — M16.0 PRIMARY OSTEOARTHRITIS OF BOTH HIPS: ICD-10-CM

## 2018-05-10 DIAGNOSIS — R29.898 LEG WEAKNESS, BILATERAL: Primary | ICD-10-CM

## 2018-05-10 DIAGNOSIS — M79.604 BILATERAL LEG PAIN: ICD-10-CM

## 2018-05-10 DIAGNOSIS — M79.605 BILATERAL LEG PAIN: ICD-10-CM

## 2018-05-10 DIAGNOSIS — G95.9 SPINAL CORD DISORDER (H): ICD-10-CM

## 2018-05-10 DIAGNOSIS — M54.16 LUMBAR RADICULOPATHY, RIGHT: Primary | ICD-10-CM

## 2018-05-10 PROCEDURE — 99214 OFFICE O/P EST MOD 30 MIN: CPT | Performed by: ORTHOPAEDIC SURGERY

## 2018-05-10 PROCEDURE — 99203 OFFICE O/P NEW LOW 30 MIN: CPT | Performed by: FAMILY MEDICINE

## 2018-05-10 RX ORDER — GABAPENTIN 300 MG/1
300 CAPSULE ORAL 2 TIMES DAILY
Qty: 180 CAPSULE | Refills: 0 | Status: SHIPPED | OUTPATIENT
Start: 2018-05-10 | End: 2018-10-05

## 2018-05-10 RX ORDER — DIAZEPAM 10 MG
10 TABLET ORAL EVERY 6 HOURS PRN
Qty: 1 TABLET | Refills: 0 | Status: SHIPPED | OUTPATIENT
Start: 2018-05-10 | End: 2018-06-07

## 2018-05-10 ASSESSMENT — ENCOUNTER SYMPTOMS
MYALGIAS: 1
SENSORY CHANGE: 0
SHORTNESS OF BREATH: 0
TINGLING: 0

## 2018-05-10 NOTE — PROGRESS NOTES
HISTORY OF PRESENT ILLNESS:    Cristy Bailey is a 72 year old female who is seen in consultation at the request of Dr. Kris Ansari for bilateral posterior hip pain with pain radiating to her feet of about 6 months duration without specific injury.    Present symptoms: pain, radiating pain, peak pain 10/10, current pain 7/10, increased pain with weight bearing, numbness and tingling into the legs  Treatments tried to this point: xray (see below), visit with dr ansari  Orthopedic PMH: none     Past Medical History:   Diagnosis Date     HTN (hypertension) 2010      Leg weakness 3/24/2015     Lyme disease  AND     lYME DZ LIKE SXS RESPONDED TO ABX       Past Surgical History:   Procedure Laterality Date     BIOPSY OF UTERUS LINING  3/2010    negative.      C C-SEC ONLY,PREV C-SEC      c-sec x 3      COLONOSCOPY  2/21/10    benign findings. advised 10 yr f/u.        Family History   Problem Relation Age of Onset     CEREBROVASCULAR DISEASE Father      -stroke at age 80     Family History Negative Mother       Diedn her 80's of unknown causes.  Pt otherwise unaware of her health hx.      Unknown/Adopted Mother      DIABETES Brother      (Christian)  of DM complications at age 50.     Alcohol/Drug Brother      Christian     C.A.D. Brother      Christian.      HEART DISEASE Brother      Christian--MI age 50.     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 Yes     Social History Narrative       Current Outpatient Prescriptions   Medication Sig Dispense Refill     gabapentin (NEURONTIN) 300 MG capsule Take 1 capsule (300 mg) by mouth 2 times daily 180 capsule 0     HYDROcodone-acetaminophen (NORCO) 5-325 MG per tablet Take 1 tablet by mouth At Bedtime 30 tablet 0     lisinopril (PRINIVIL/ZESTRIL) 5 MG tablet Take 1 tablet (5 mg) by mouth daily 90 tablet 1     order for DME Equipment being ordered: Compression stockings 20-30mmHg 2 Package 0       Allergies   Allergen Reactions     No Known Drug Allergies        REVIEW OF SYSTEMS:  CONSTITUTIONAL:  NEGATIVE for fever, chills, change in weight  INTEGUMENTARY/SKIN:  NEGATIVE for worrisome rashes, moles or lesions  EYES:  NEGATIVE for vision changes or irritation  ENT/MOUTH:  NEGATIVE for ear, mouth and throat problems  RESP:  NEGATIVE for significant cough or SOB  BREAST:  NEGATIVE for masses, tenderness or discharge  CV:  NEGATIVE for chest pain, palpitations or peripheral edema  GI:  NEGATIVE for nausea, abdominal pain, heartburn, or change in bowel habits  :  Negative   MUSCULOSKELETAL:  See HPI above  NEURO:  NEGATIVE for weakness, dizziness or paresthesias  ENDOCRINE:  NEGATIVE for temperature intolerance, skin/hair changes  HEME/ALLERGY/IMMUNE:  NEGATIVE for bleeding problems  PSYCHIATRIC:  NEGATIVE for changes in mood or affect      PHYSICAL EXAM:  /80  Ht 5' 6\" (1.676 m)  Wt 263 lb (119.3 kg)  BMI 42.45 kg/m2  Body mass index is 42.45 kg/(m^2).   GENERAL APPEARANCE: healthy, alert and no distress   SKIN: no suspicious lesions or rashes  NEURO: Normal strength and tone, mentation intact and speech normal  VASCULAR: Good pulses, and capillary refill   LYMPH: no lymphadenopathy   PSYCH:  mentation appears normal and affect " normal/bright    MSK:  A&OX3, NAD  Neck supple, no lymphadenopathy  The patient ambulates without an antalgic gait.  The patient is able to get on and off the exam table without difficulty.    Examination of the spine reveals a normal lordosis to the cervical and lumbar spine, and a normal kyphosis to the thoracic spine.  There is no clinical evidence of scoliosis.    The pelvis is clinically level.  Trendelenberg is negative.  The patient is tender to palpation over the greater trochanteric bursal region or piriformis fossa.  With the hip flexed to 90 degrees, internal and external rotation is 20/40 respectively,  with no pain.  The calves and thighs are symmetric, without atrophy and non-tender to palpation. Leilani's sign is negative, bilaterally.   CMS is intact to the toes.      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  I ordered an MRI of the lumbar spine.    Imaging Interpretation:     Exam: Single frontal view of the pelvis dated 4/30/2018.     COMPARISON: 2/16/2011.     CLINICAL HISTORY: Fall.     FINDINGS: Single frontal view of the pelvis was obtained. The bones  are osteopenic appearing. Severe osteoarthrosis in both hip joints  with slight lateral displacement of the femoral heads in relation to  the acetabulum. There appears to be marked collapse along the right  femoral head, not well evaluated on a single view.         IMPRESSION: Suboptimal evaluation of the bilateral hips, due to a  single view, however there are marked degenerative changes in both hip  joints with collapse of the right femoral head. If clinical concern  for acute fracture, additional views or MRI could be considered.     PETTY GAMEZ MD  Exam: 2 views of the lumbar spine dated 4/30/2018.     COMPARISON: 2/16/2011.     CLINICAL HISTORY: Fall, evaluate for fracture.     FINDINGS: AP and lateral views of the lumbar spine were obtained. The  lateral view is suboptimal. No definite compression fractures in the  lumbar spine.  Slight rightward slip of L4 in relation to L5.     Destructive changes in the right hip joint with severe osteoarthrosis  in both hips, better evaluated on the radiographs of the pelvis from  the same day.         IMPRESSION:  1. Poor visualization of the lumbar spine on the lateral view with  degenerative changes in the lumbar spine, as above.  2. Marked degenerative changes in both hip joints, please refer to the  radiographs of the pelvis from the same day for more complete  evaluation.     MD Ryan MCCORMICK MD  Department of Orthopedic Surgery

## 2018-05-10 NOTE — PATIENT INSTRUCTIONS
1. Lumbar radiculopathy, right       Advanced imaging is done by appointment. Some insurance require a prior authorization to be completed which may delay the time until you are able to schedule your appointment. You should be receiving a call from the scheduling department, if you have not heard from them in 24-48 hours.   Please call [facility] [place]: [phone] to schedule your MRI.  Depending on your availability you can usually schedule within the next 1-2 days.    Take 2 Norco and the valium 30-45 min prior to your exam    Bry Garcia MD

## 2018-05-10 NOTE — TELEPHONE ENCOUNTER
I clarified with Dr. Garcia that the rx was to be for 2 caps BID and he ok'd the qty to 360 with no refills.    Leo Hager, ATC

## 2018-05-10 NOTE — PROGRESS NOTES
[facility]   Clinic Visit s May 10, 2018    PCP: Manoj Daley Robert Muniz is a 72 year old female who is seen in consultation at the request of Dr. Daley for   Chief Complaint   Patient presents with     Hip Pain       Injury: Reports insidious onset without acute precipitating event.    Location of Pain: right posterior hip, bilateral legs, radiating to feet   Duration of Pain: 6 month(s)  Rating of Pain at worst: 10/10  Rating of Pain Currently: 7/10  Pain is better with: sitting   Pain is worse with: standing and walking    Treatment so far consists of: physical therapy at home  Associated symptoms: numbness and tingling  Recent imaging completed: X-rays completed 4/30/18.  Prior History of related problems: patient had large decline in walking and standing ability 5 years ago    Patient also notes stinging pain in her hands.     Social History: retired     Past Medical History:   Diagnosis Date     HTN (hypertension) 1/2010      Leg weakness 3/24/2015     Lyme disease 1980'S AND 2004    lYME DZ LIKE SXS RESPONDED TO ABX       Patient Active Problem List    Diagnosis Date Noted     Morbid obesity (H) 09/22/2017     Priority: Medium     Constipation 03/25/2015     Priority: Medium     Problem list name updated by automated process. Provider to review       Myalgia and myositis 03/25/2015     Priority: Medium     Problem list name updated by automated process. Provider to review       Leg weakness 03/24/2015     Priority: Medium     Constipation 04/01/2014     Priority: Medium     HTN, goal below 140/90 07/30/2013     Priority: Medium     Health Care Home 05/18/2012     Priority: Medium     FPA [facility] for .  Amy Wray RN-BSN, [facility]  [phone]   DX V65.8 REPLACED WITH [id number] HEALTH CARE HOME (04/08/2013)       CRP elevated 05/17/2012     Priority: Medium     Vitamin D deficiency 05/17/2012     Priority: Medium     Leg weakness, bilateral  2012     Priority: Medium     Hyperlipidemia LDL goal <160 2011     Priority: Medium     Abnormal glucose 2011     Priority: Medium     IMO update changed this record. Please review for accuracy       Obesity 2011     Priority: Medium     Advanced directives, counseling/discussion 2011     Priority: Medium     Advance Directive Problem List Overview:   Name Relationship Phone    Primary Health Care Agent            Alternative Health Care Agent          Discussed advance care planning with patient; information given to patient to review. 2011          HTN (hypertension) 2010     Priority: Medium       Family History   Problem Relation Age of Onset     CEREBROVASCULAR DISEASE Father      -stroke at age 80     Family History Negative Mother       Diedn her 80's of unknown causes.  Pt otherwise unaware of her health hx.      Unknown/Adopted Mother      DIABETES Brother      (Christian)  of DM complications at age 50.     Alcohol/Drug Brother      Christian     C.A.D. Brother      Christian.      HEART DISEASE Brother      Christian--MI age 50.     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      C C-SEC ONLY,PREV C-SEC      c-sec x 3      COLONOSCOPY  2/21/10    benign findings. advised 10 yr f/u.   "    Review of Systems   Respiratory: Negative for shortness of breath.    Cardiovascular: Positive for leg swelling.   Musculoskeletal: Positive for joint pain and myalgias.   Neurological: Positive for focal weakness. Negative for tingling and sensory change.   All other systems reviewed and are negative.        Physical Exam   /80  Ht 5' 6\" (1.676 m)  Wt 263 lb (119.3 kg)  BMI 42.45 kg/m2  Constitutional:well-developed, well-nourished, and in some distress.   Cardiovascular: Intact distal pulses.    Neurological: alert. Gait Abnormal:   requires substantial assistance with walking, markedly antalgic, unable to actually ambulate   Skin: Skin is warm and dry.   Psychiatric: Mood and affect fragile, frequently tearful.   Respiratory: unlabored, speaks in full sentences  Lymph: no LAD, no lymphangitis          Left Hip Exam   Gait: Abnormal.    Tenderness   None    Range of Motion   Extension:            N/t  Flexion:                 90  Internal Rotation:  10  External Rotation: 20  Abduction:            20  Adduction:            N/t    Muscle Strength   Abduction:  4/5  Adduction:  4/5  Flexion:      4/5    Tests   Suzy: n/t  Ck:  N/t    Right Hip Exam   Gait: Abnormal.    Tenderness   The patient is experiencing tenderness in the greater trochanter, anterior.    Range of Motion   Extension:            N/t  Flexion:                 60  Internal Rotation:  0  External Rotation: 20  Abduction:            20  Adduction:            N/t    Tests   Suzy: n/t  Ck:  N/t    Back Exam     Tenderness   None        Recent Results (from the past 744 hour(s))   Lumbar spine XR, 2-3 views    Narrative    Exam: 2 views of the lumbar spine dated 4/30/2018.    COMPARISON: 2/16/2011.    CLINICAL HISTORY: Fall, evaluate for fracture.    FINDINGS: AP and lateral views of the lumbar spine were obtained. The  lateral view is suboptimal. No definite compression fractures in the  lumbar spine. Slight rightward slip of L4 in " relation to L5.    Destructive changes in the right hip joint with severe osteoarthrosis  in both hips, better evaluated on the radiographs of the pelvis from  the same day.      Impression    IMPRESSION:  1. Poor visualization of the lumbar spine on the lateral view with  degenerative changes in the lumbar spine, as above.  2. Marked degenerative changes in both hip joints, please refer to the  radiographs of the pelvis from the same day for more complete  evaluation.    PETTY GAMEZ MD   XR Pelvis 1/2 Views    Narrative    Exam: Single frontal view of the pelvis dated 4/30/2018.    COMPARISON: 2/16/2011.    CLINICAL HISTORY: Fall.    FINDINGS: Single frontal view of the pelvis was obtained. The bones  are osteopenic appearing. Severe osteoarthrosis in both hip joints  with slight lateral displacement of the femoral heads in relation to  the acetabulum. There appears to be marked collapse along the right  femoral head, not well evaluated on a single view.      Impression    IMPRESSION: Suboptimal evaluation of the bilateral hips, due to a  single view, however there are marked degenerative changes in both hip  joints with collapse of the right femoral head. If clinical concern  for acute fracture, additional views or MRI could be considered.    PETTY GAMEZ MD     ASSESSMENT/PLAN    ICD-10-CM    1. Leg weakness, bilateral R29.898    2. Primary osteoarthritis of both hips M16.0        Acute severe pain with essentially any motion of the right greater than left hip.  Abnormal position of right lower extremity raises concern for right femoral neck insufficiency fracture.  I requested a consultation with Dr. Ryan Garcia, orthopedic surgery who raised concern for spinal etiology.  Further evaluation per Dr. Garcia and neurosurgery pending MRIs.

## 2018-05-10 NOTE — MR AVS SNAPSHOT
After Visit Summary   5/10/2018    Cristy Bailey    MRN: [id number]           Patient Information     Date Of Birth          1945        Visit Information        Provider Department      5/10/2018 9:00 AM Kris Freeman MD [facility]        Today's Diagnoses     Leg weakness, bilateral    -  1    Primary osteoarthritis of both hips           Follow-ups after your visit        Your next 10 appointments already scheduled     May 14, 2018  1:00 PM CDT   MR LUMBAR SPINE W/O CONTRAST with RHMR1   [facility] ([facility])    [address]  [facility] [address]   [phone]           Take your medicines as usual, unless your doctor tells you not to. Bring a list of your current medicines to your exam (including vitamins, minerals and over-the-counter drugs). Also bring the results of similar scans you may have had.  Please remove any body piercings and hair extensions before you arrive.  Follow your doctor s orders. If you do not, we may have to postpone your exam.  You may or may not receive IV contrast for this exam pending the discretion of the Radiologist.  You do not need to do anything special to prepare.  The MRI machine uses a strong magnet. Please wear clothes without metal (snaps, zippers). A sweatsuit works well, or we may give you a hospital gown.   **IMPORTANT** THE INSTRUCTIONS BELOW ARE ONLY FOR THOSE PATIENTS WHO HAVE BEEN PRESCRIBED SEDATION OR GENERAL ANESTHESIA DURING THEIR MRI PROCEDURE:  IF YOUR DOCTOR PRESCRIBED ORAL SEDATION (take medicine to help you relax during your exam):   You must get the medicine from your doctor (oral medication) before you arrive. Bring the medicine to the exam. Do not take it at home. You ll be told when to take it upon arriving for your exam.   Arrive one hour early. Bring someone who can take you home after the test. Your medicine will make you sleepy. After the exam, you may not drive,  take a bus or take a taxi by yourself.  IF YOUR DOCTOR PRESCRIBED IV SEDATION:   Arrive one hour early. Bring someone who can take you home after the test. Your medicine will make you sleepy. After the exam, you may not drive, take a bus or take a taxi by yourself.   No eating 6 hours before your exam. You may have clear liquids up until 4 hours before your exam. (Clear liquids include water, clear tea, black coffee and fruit juice without pulp.)  IF YOUR DOCTOR PRESCRIBED ANESTHESIA (be asleep for your exam):   Arrive 1 1/2 hours early. Bring someone who can take you home after the test. You may not drive, take a bus or take a taxi by yourself.   No eating 8 hours before your exam. You may have clear liquids up until 4 hours before your exam. (Clear liquids include water, clear tea, black coffee and fruit juice without pulp.)   You will spend four to five hours in the recovery room.  Please call the Imaging Department at your exam site with any questions.            May 14, 2018  1:30 PM CDT   MR CERVICAL SPINE W/O CONTRAST with RHMR1   [facility] ([facility])    [address] [facility] [address]   [phone]           Take your medicines as usual, unless your doctor tells you not to. Bring a list of your current medicines to your exam (including vitamins, minerals and over-the-counter drugs). Also bring the results of similar scans you may have had.  Please remove any body piercings and hair extensions before you arrive.  Follow your doctor s orders. If you do not, we may have to postpone your exam.  You may or may not receive IV contrast for this exam pending the discretion of the Radiologist.  You do not need to do anything special to prepare.  The MRI machine uses a strong magnet. Please wear clothes without metal (snaps, zippers). A sweatsuit works well, or we may give you a hospital gown.   **IMPORTANT** THE INSTRUCTIONS BELOW ARE ONLY FOR THOSE PATIENTS WHO HAVE BEEN  PRESCRIBED SEDATION OR GENERAL ANESTHESIA DURING THEIR MRI PROCEDURE:  IF YOUR DOCTOR PRESCRIBED ORAL SEDATION (take medicine to help you relax during your exam):   You must get the medicine from your doctor (oral medication) before you arrive. Bring the medicine to the exam. Do not take it at home. You ll be told when to take it upon arriving for your exam.   Arrive one hour early. Bring someone who can take you home after the test. Your medicine will make you sleepy. After the exam, you may not drive, take a bus or take a taxi by yourself.  IF YOUR DOCTOR PRESCRIBED IV SEDATION:   Arrive one hour early. Bring someone who can take you home after the test. Your medicine will make you sleepy. After the exam, you may not drive, take a bus or take a taxi by yourself.   No eating 6 hours before your exam. You may have clear liquids up until 4 hours before your exam. (Clear liquids include water, clear tea, black coffee and fruit juice without pulp.)  IF YOUR DOCTOR PRESCRIBED ANESTHESIA (be asleep for your exam):   Arrive 1 1/2 hours early. Bring someone who can take you home after the test. You may not drive, take a bus or take a taxi by yourself.   No eating 8 hours before your exam. You may have clear liquids up until 4 hours before your exam. (Clear liquids include water, clear tea, black coffee and fruit juice without pulp.)   You will spend four to five hours in the recovery room.  Please call the Imaging Department at your exam site with any questions.            May 24, 2018 11:50 AM CDT   New Visit with Farnaz Delgado NP   [facility] ([facility])    [address] [place] [id number] [phone]              Who to contact     If you have questions or need follow up information about today's clinic visit or your schedule please contact [facility] directly at [phone].  Normal or non-critical lab and imaging  "results will be communicated to you by MyChart, letter or phone within 4 business days after the clinic has received the results. If you do not hear from us within 7 days, please contact the clinic through "[employer]t or phone. If you have a critical or abnormal lab result, we will notify you by phone as soon as possible.  Submit refill requests through [employer] or call your pharmacy and they will forward the refill request to us. Please allow 3 business days for your refill to be completed.          Additional Information About Your Visit        [employer] Information     [employer] lets you send messages to your doctor, view your test results, renew your prescriptions, schedule appointments and more. To sign up, go to [email or web address] . Click on \"Log in\" on the left side of the screen, which will take you to the Welcome page. Then click on \"Sign up Now\" on the right side of the page.     You will be asked to enter the access code listed below, as well as some personal information. Please follow the directions to create your username and password.     Your access code is: [email or web address]  Expires: 2018  2:36 PM     Your access code will  in 90 days. If you need help or a new code, please call your [facility] [place] clinic or [phone].        Care EveryWhere ID     This is your Care EveryWhere ID. This could be used by other organizations to access your [facility] medical records  [id number]        Your Vitals Were     Height BMI (Body Mass Index)                5' 6\" (1.676 m) 42.45 kg/m2           Blood Pressure from Last 3 Encounters:   05/10/18 140/80   05/10/18 140/80   18 (!) 134/111    Weight from Last 3 Encounters:   05/10/18 263 lb (119.3 kg)   05/10/18 263 lb (119.3 kg)   18 263 lb 7.2 oz (119.5 kg)              Today, you had the following     No orders found for display       Primary Care Provider Office Phone # Fax #    Manoj Daley PA-C [phone]       [address]  [place] [address]        Goals        General    Pain Management (pt-stated)     Notes - Note created  4/5/2018  2:53 PM by Francisca Hernandez LSW    Goal Statement: I will have less pain.  Measure of Success: Will have intervention that addresses my pain.   Supportive Steps to Achieve: CC will contact PCP to discuss patient concerns and next steps.   Barriers: Cannot leave the house without extreme effort. Is having extreme pain and does not have any sense that it will get better. Her  is her caregiver and he is frustrated with her pain.  Strengths: Has  who supports her.    Date to Achieve By: 9-1-2018        Equal Access to Services     [facility] Center[facility]IP[facility] : Hadbrooke Martin, watobin esposito, elsi mendez, larry davalos . So [facility] [phone].    ATENCIÓN: Si habla español, tiene a pompa disposición servicios gratuitos de asistencia lingüística. Llame al [phone].    We comply with applicable federal civil rights laws and [place] laws. We do not discriminate on the basis of race, color, national origin, age, disability, sex, sexual orientation, or gender identity.            Thank you!     Thank you for choosing [facility]  for your care. Our goal is always to provide you with excellent care. Hearing back from our patients is one way we can continue to improve our services. Please take a few minutes to complete the written survey that you may receive in the mail after your visit with us. Thank you!             Your Updated Medication List - Protect others around you: Learn how to safely use, store and throw away your medicines at www.disposemymeds.org.          This list is accurate as of 5/10/18 11:59 PM.  Always use your most recent med list.                   Brand Name Dispense Instructions for use Diagnosis    diazepam 10 MG tablet    VALIUM    1 tablet    Take 1 tablet (10 mg) by mouth every 6 hours as needed for anxiety  or sleep Take 30-60 minutes before procedure.  Do not operate a vehicle after taking this medication.    Lumbar radiculopathy, right       gabapentin 300 MG capsule    NEURONTIN    180 capsule    Take 1 capsule (300 mg) by mouth 2 times daily    Weakness of both lower extremities, Bilateral leg pain       HYDROcodone-acetaminophen 5-325 MG per tablet    NORCO    30 tablet    Take 1 tablet by mouth At Bedtime    Bilateral leg pain       lisinopril 5 MG tablet    PRINIVIL/ZESTRIL    90 tablet    Take 1 tablet (5 mg) by mouth daily    HTN, goal below 140/90       order for DME     2 Package    Equipment being ordered: Compression stockings 20-30mmHg    Lower extremity edema

## 2018-05-10 NOTE — Clinical Note
5/10/2018         RE: Cristy Bailey  [address]  [place] [address]        Dear Colleague,    Thank you for referring your patient, Cristy Bailey, to the [facility]. Please see a copy of my visit note below.    HISTORY OF PRESENT ILLNESS:    Cristy Bailey is a 72 year old female who is seen in consultation at the request of Dr. Kris Ansari for bilateral posterior hip pain with pain radiating to her feet of about 6 months duration without specific injury.    Present symptoms: pain, radiating pain, peak pain 10/10, current pain 7/10, increased pain with weight bearing, numbness and tingling into the legs  Treatments tried to this point: xray (see below), visit with dr ansari  Orthopedic PMH: none     Past Medical History:   Diagnosis Date     HTN (hypertension) 2010      Leg weakness 3/24/2015     Lyme disease S AND     lYME DZ LIKE SXS RESPONDED TO ABX       Past Surgical History:   Procedure Laterality Date     BIOPSY OF UTERUS LINING  3/2010    negative.      C C-SEC ONLY,PREV C-SEC      c-sec x 3      COLONOSCOPY  2/21/10    benign findings. advised 10 yr f/u.        Family History   Problem Relation Age of Onset     CEREBROVASCULAR DISEASE Father      -stroke at age 80     Family History Negative Mother       Diedn her 80's of unknown causes.  Pt otherwise unaware of her health hx.      Unknown/Adopted Mother      DIABETES Brother      (Christian)  of DM complications at age 50.     Alcohol/Drug Brother      Christian     C.A.D. Brother      Christian.      HEART DISEASE Brother      Christian--MI age 50.     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 Yes     Social History Narrative       Current Outpatient Prescriptions   Medication Sig Dispense Refill     gabapentin (NEURONTIN) 300 MG capsule Take 1 capsule (300 mg) by mouth 2 times daily 180 capsule 0     HYDROcodone-acetaminophen (NORCO) 5-325 MG per tablet Take 1 tablet by mouth At Bedtime 30 tablet 0     lisinopril (PRINIVIL/ZESTRIL) 5 MG tablet Take 1 tablet (5 mg) by mouth daily 90 tablet 1     order for DME Equipment being ordered: Compression stockings 20-30mmHg 2 Package 0       Allergies   Allergen Reactions     No Known Drug Allergies        REVIEW OF SYSTEMS:  CONSTITUTIONAL:  NEGATIVE for fever, chills, change in weight  INTEGUMENTARY/SKIN:  NEGATIVE for worrisome rashes, moles or lesions  EYES:  NEGATIVE for vision changes or irritation  ENT/MOUTH:  NEGATIVE for ear, mouth and throat problems  RESP:  NEGATIVE for significant cough or SOB  BREAST:  NEGATIVE for masses, tenderness or discharge  CV:  NEGATIVE for chest pain, palpitations or peripheral edema  GI:  NEGATIVE for nausea, abdominal pain, heartburn, or change in bowel habits  :  Negative   MUSCULOSKELETAL:  See HPI above  NEURO:  NEGATIVE for weakness, dizziness or paresthesias  ENDOCRINE:  NEGATIVE for temperature intolerance, skin/hair changes  HEME/ALLERGY/IMMUNE:  NEGATIVE for bleeding problems  PSYCHIATRIC:  NEGATIVE for changes in mood or affect      PHYSICAL EXAM:  /80  Ht 5' 6\" (1.676 m)  Wt 263 lb (119.3 kg)  BMI 42.45 kg/m2  Body mass index is 42.45 kg/(m^2).   GENERAL APPEARANCE: healthy, " alert and no distress   SKIN: no suspicious lesions or rashes  NEURO: Normal strength and tone, mentation intact and speech normal  VASCULAR: Good pulses, and capillary refill   LYMPH: no lymphadenopathy   PSYCH:  mentation appears normal and affect normal/bright    MSK:  A&OX3, NAD  Neck supple, no lymphadenopathy  The patient ambulates without an antalgic gait.  The patient is able to get on and off the exam table without difficulty.    Examination of the spine reveals a normal lordosis to the cervical and lumbar spine, and a normal kyphosis to the thoracic spine.  There is no clinical evidence of scoliosis.    The pelvis is clinically level.  Trendelenberg is negative.  The patient is ***tender to palpation over the greater trochanteric bursal region or piriformis fossa.  With the hip flexed to 90 degrees, internal and external rotation is *** respectively,  with ***.  The calves and thighs are symmetric, without atrophy and non-tender to palpation. Leilani's sign is negative, bilaterally.   CMS is intact to the toes.      ASSESSMENT / PLAN:  No diagnosis found.      Imaging Interpretation:   ***  Exam: Single frontal view of the pelvis dated 4/30/2018.     COMPARISON: 2/16/2011.     CLINICAL HISTORY: Fall.     FINDINGS: Single frontal view of the pelvis was obtained. The bones  are osteopenic appearing. Severe osteoarthrosis in both hip joints  with slight lateral displacement of the femoral heads in relation to  the acetabulum. There appears to be marked collapse along the right  femoral head, not well evaluated on a single view.         IMPRESSION: Suboptimal evaluation of the bilateral hips, due to a  single view, however there are marked degenerative changes in both hip  joints with collapse of the right femoral head. If clinical concern  for acute fracture, additional views or MRI could be considered.     PETTY GAMEZ MD  Exam: 2 views of the lumbar spine dated 4/30/2018.     COMPARISON:  2/16/2011.     CLINICAL HISTORY: Fall, evaluate for fracture.     FINDINGS: AP and lateral views of the lumbar spine were obtained. The  lateral view is suboptimal. No definite compression fractures in the  lumbar spine. Slight rightward slip of L4 in relation to L5.     Destructive changes in the right hip joint with severe osteoarthrosis  in both hips, better evaluated on the radiographs of the pelvis from  the same day.         IMPRESSION:  1. Poor visualization of the lumbar spine on the lateral view with  degenerative changes in the lumbar spine, as above.  2. Marked degenerative changes in both hip joints, please refer to the  radiographs of the pelvis from the same day for more complete  evaluation.     MD Ryan MCCORMICK MD  Department of Orthopedic Surgery          Again, thank you for allowing me to participate in the care of your patient.        Sincerely,        Bry Garcia MD

## 2018-05-10 NOTE — LETTER
5/10/2018         RE: Cristy Bailey  [address]   [place] [address]        Dear Colleague,    Thank you for referring your patient, Cristy Bailey, to the [facility] SPORTS MEDICINE. Please see a copy of my visit note below.    HPI     [facility]   Clinic Visit s May 10, 2018    PCP: Manoj Daley Robert Muniz is a 72 year old female who is seen in consultation at the request of Dr. Daley for   Chief Complaint   Patient presents with     Hip Pain       Injury: Reports insidious onset without acute precipitating event.    Location of Pain: right posterior hip, bilateral legs, radiating to feet   Duration of Pain: 6 month(s)  Rating of Pain at worst: 10/10  Rating of Pain Currently: 7/10  Pain is better with: sitting   Pain is worse with: standing and walking    Treatment so far consists of: physical therapy at home  Associated symptoms: numbness and tingling  Recent imaging completed: X-rays completed 4/30/18.  Prior History of related problems: patient had large decline in walking and standing ability 5 years ago    Patient also notes stinging pain in her hands.     Social History: retired     Past Medical History:   Diagnosis Date     HTN (hypertension) 1/2010      Leg weakness 3/24/2015     Lyme disease 1980'S AND 2004    lYME DZ LIKE SXS RESPONDED TO ABX       Patient Active Problem List    Diagnosis Date Noted     Morbid obesity (H) 09/22/2017     Priority: Medium     Constipation 03/25/2015     Priority: Medium     Problem list name updated by automated process. Provider to review       Myalgia and myositis 03/25/2015     Priority: Medium     Problem list name updated by automated process. Provider to review       Leg weakness 03/24/2015     Priority: Medium     Constipation 04/01/2014     Priority: Medium     HTN, goal below 140/90 07/30/2013     Priority: Medium     Health Care Home 05/18/2012     Priority: Medium     FPA [employer] [facility] case  manager.  Amy Wray RN-BSN, [facility]  [phone]   DX V65.8 REPLACED WITH 29694 HEALTH CARE HOME (2013)       CRP elevated 2012     Priority: Medium     Vitamin D deficiency 2012     Priority: Medium     Leg weakness, bilateral 2012     Priority: Medium     Hyperlipidemia LDL goal <160 2011     Priority: Medium     Abnormal glucose 2011     Priority: Medium     IMO update changed this record. Please review for accuracy       Obesity 2011     Priority: Medium     Advanced directives, counseling/discussion 2011     Priority: Medium     Advance Directive Problem List Overview:   Name Relationship Phone    Primary Health Care Agent            Alternative Health Care Agent          Discussed advance care planning with patient; information given to patient to review. 2011          HTN (hypertension) 2010     Priority: Medium       Family History   Problem Relation Age of Onset     CEREBROVASCULAR DISEASE Father      -stroke at age 80     Family History Negative Mother       Diedn her 80's of unknown causes.  Pt otherwise unaware of her health hx.      Unknown/Adopted Mother      DIABETES Brother      (Christian)  of DM complications at age 50.     Alcohol/Drug Brother      Christian     C.A.D. Brother      Christian.      HEART DISEASE Brother      Christian--MI age 50.     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      C C-SEC ONLY,PREV C-SEC      c-sec x 3      COLONOSCOPY  2/21/10    benign findings. advised 10 yr f/u.      Review of Systems   Respiratory: Negative for shortness of breath.    Cardiovascular: Positive for leg swelling.   Musculoskeletal: Positive for joint pain and myalgias.   Neurological: Positive for focal weakness. Negative for tingling and sensory change.   All other systems reviewed and are negative.        Physical Exam   /80  Ht 5' 6\" (1.676 m)  Wt 263 lb (119.3 kg)  BMI 42.45 kg/m2  Constitutional:well-developed, well-nourished, and in some distress.   Cardiovascular: Intact distal pulses.    Neurological: alert. Gait Abnormal:   requires substantial assistance with walking, markedly antalgic, unable to actually ambulate   Skin: Skin is warm and dry.   Psychiatric: Mood and affect fragile, frequently tearful.   Respiratory: unlabored, speaks in full sentences  Lymph: no LAD, no lymphangitis          Left Hip Exam   Gait: Abnormal.    Tenderness   None    Range of Motion   Extension:            N/t  Flexion:                 90  Internal Rotation:  10  External Rotation: 20  Abduction:            20  Adduction:            N/t    Muscle Strength   Abduction:  4/5  Adduction:  4/5  Flexion:      4/5    Tests   Suzy: n/t  Ck:  N/t    Right Hip Exam   Gait: Abnormal.    Tenderness   The patient is experiencing tenderness in the greater trochanter, anterior.    Range of Motion   Extension:            N/t  Flexion:                 60  Internal Rotation:  0  External Rotation: 20  Abduction:            20  Adduction:            N/t    Tests   Suzy: n/t  Ck:  N/t    Back Exam     Tenderness   None        Recent Results (from the past 744 hour(s))   Lumbar spine XR, 2-3 views    Narrative    Exam: 2 views of the lumbar spine dated " 4/30/2018.    COMPARISON: 2/16/2011.    CLINICAL HISTORY: Fall, evaluate for fracture.    FINDINGS: AP and lateral views of the lumbar spine were obtained. The  lateral view is suboptimal. No definite compression fractures in the  lumbar spine. Slight rightward slip of L4 in relation to L5.    Destructive changes in the right hip joint with severe osteoarthrosis  in both hips, better evaluated on the radiographs of the pelvis from  the same day.      Impression    IMPRESSION:  1. Poor visualization of the lumbar spine on the lateral view with  degenerative changes in the lumbar spine, as above.  2. Marked degenerative changes in both hip joints, please refer to the  radiographs of the pelvis from the same day for more complete  evaluation.    PETTY GAMEZ MD   XR Pelvis 1/2 Views    Narrative    Exam: Single frontal view of the pelvis dated 4/30/2018.    COMPARISON: 2/16/2011.    CLINICAL HISTORY: Fall.    FINDINGS: Single frontal view of the pelvis was obtained. The bones  are osteopenic appearing. Severe osteoarthrosis in both hip joints  with slight lateral displacement of the femoral heads in relation to  the acetabulum. There appears to be marked collapse along the right  femoral head, not well evaluated on a single view.      Impression    IMPRESSION: Suboptimal evaluation of the bilateral hips, due to a  single view, however there are marked degenerative changes in both hip  joints with collapse of the right femoral head. If clinical concern  for acute fracture, additional views or MRI could be considered.    PETTY GAMEZ MD     ASSESSMENT/PLAN    ICD-10-CM    1. Leg weakness, bilateral R29.898    2. Primary osteoarthritis of both hips M16.0        Acute severe pain with essentially any motion of the right greater than left hip.  Abnormal position of right lower extremity raises concern for right femoral neck insufficiency fracture.  I requested a consultation with Dr. Ryan Garcia, orthopedic surgery  who raised concern for spinal etiology.  Further evaluation per Dr. Garcia and neurosurgery pending MRIs.    Again, thank you for allowing me to participate in the care of your patient.        Sincerely,        Kris Freeman MD

## 2018-05-10 NOTE — MR AVS SNAPSHOT
After Visit Summary   5/10/2018    Cristy Bailey    MRN: [id number]           Patient Information     Date Of Birth          1945        Visit Information        Provider Department      5/10/2018 9:50 AM Bry Garcia MD [facility] ORTHOPEDIC SURGERY        Today's Diagnoses     Lumbar radiculopathy, right    -  1    Weakness of both lower extremities        Bilateral leg pain          Care Instructions    1. Lumbar radiculopathy, right       Advanced imaging is done by appointment. Some insurance require a prior authorization to be completed which may delay the time until you are able to schedule your appointment. You should be receiving a call from the scheduling department, if you have not heard from them in 24-48 hours.   Please call [facility]: [phone] to schedule your MRI.  Depending on your availability you can usually schedule within the next 1-2 days.    Take 2 Norco and the valium 30-45 min prior to your exam    Bry Garcia MD                        Follow-ups after your visit        Additional Services     ORTHO  REFERRAL       [facility] is referring you to the Orthopedic  Services at [facility].       The  Representative will assist you in the coordination of your Orthopedic and Musculoskeletal Care as prescribed by your physician.    The  Representative will call you within 24 hours to help schedule your appointment, or you may contact the  Representative at:    [facility] ~ [phone]  [facility] ~ [phone]  [facility] ~ [phone]    Type of Referral : Spine: Lumbar Spine Surgeon  - Dr. Gaytan/Elma Hill - right leg sciatica       Timeframe requested: Within 2 weeks     Coverage of these services is subject to the terms and limitations of your health insurance plan.  Please call member services at  "your health plan with any benefit or coverage questions.      If X-rays, CT or MRI's have been performed, please contact the facility where they were done to arrange for , prior to your scheduled appointment.  Please bring this referral request to your appointment and present it to your specialist.                  Future tests that were ordered for you today     Open Future Orders        Priority Expected Expires Ordered    MR Lumbar Spine w/o Contrast Routine  2019 5/10/2018            Who to contact     If you have questions or need follow up information about today's clinic visit or your schedule please contact [facility] ORTHOPEDIC SURGERY directly at [phone].  Normal or non-critical lab and imaging results will be communicated to you by [employer], letter or phone within 4 business days after the clinic has received the results. If you do not hear from us within 7 days, please contact the clinic through [employer] or phone. If you have a critical or abnormal lab result, we will notify you by phone as soon as possible.  Submit refill requests through [email or web address] or call your pharmacy and they will forward the refill request to us. Please allow 3 business days for your refill to be completed.          Additional Information About Your Visit        [email or web address] Information     [email or web address] lets you send messages to your doctor, view your test results, renew your prescriptions, schedule appointments and more. To sign up, go to [email or web address] . Click on \"Log in\" on the left side of the screen, which will take you to the Welcome page. Then click on \"Sign up Now\" on the right side of the page.     You will be asked to enter the access code listed below, as well as some personal information. Please follow the directions to create your username and password.     Your access code is: [email or web address]  Expires: 2018  2:36 PM     Your access code will  in 90 days. If you need help or a new code, please " "call your [facility] clinic or [phone].        Care EveryWhere ID     This is your Care EveryWhere ID. This could be used by other organizations to access your [facility] medical records  [id number]        Your Vitals Were     Height BMI (Body Mass Index)                5' 6\" (1.676 m) 42.45 kg/m2           Blood Pressure from Last 3 Encounters:   05/10/18 140/80   05/10/18 140/80   04/30/18 (!) 134/111    Weight from Last 3 Encounters:   05/10/18 263 lb (119.3 kg)   05/10/18 263 lb (119.3 kg)   04/30/18 263 lb 7.2 oz (119.5 kg)              We Performed the Following     ORTHO  REFERRAL          Today's Medication Changes          These changes are accurate as of 5/10/18 10:03 AM.  If you have any questions, ask your nurse or doctor.               Start taking these medicines.        Dose/Directions    diazepam 10 MG tablet   Commonly known as:  VALIUM   Used for:  Lumbar radiculopathy, right   Started by:  Bry Garcia MD        Dose:  10 mg   Take 1 tablet (10 mg) by mouth every 6 hours as needed for anxiety or sleep Take 30-60 minutes before procedure.  Do not operate a vehicle after taking this medication.   Quantity:  1 tablet   Refills:  0            Where to get your medicines      These medications were sent to [id number] - [place], [place] - [address]  [address] [address] [facility] [address]    Hours:  9Am-9Pm (M-F) 9Am-6Pm (S&S) Phone:  [phone]     gabapentin 300 MG capsule         Some of these will need a paper prescription and others can be bought over the counter.  Ask your nurse if you have questions.     Bring a paper prescription for each of these medications     diazepam 10 MG tablet                Primary Care Provider Office Phone # Fax #    Manoj Daley PA-C [phone] [address]  [facility] [address]        Goals        General    Pain Management (pt-stated)     Notes - Note created  4/5/2018  2:53 PM by Mary" JOSE GUADALUPE Espinosa    Goal Statement: I will have less pain.  Measure of Success: Will have intervention that addresses my pain.   Supportive Steps to Achieve: CC will contact PCP to discuss patient concerns and next steps.   Barriers: Cannot leave the house without extreme effort. Is having extreme pain and does not have any sense that it will get better. Her  is her caregiver and he is frustrated with her pain.  Strengths: Has  who supports her.    Date to Achieve By: 9-1-2018        Equal Access to Services     MICHELLE WISE : Hadii aad ku hadasho Soomaali, waaxda luqadaha, qaybta kaalmada adeegyada, waxay julioin latosha davalos . So [facility] [phone].    ATENCIÓN: Si wilber de guzman, tiene a pompa disposición servicios gratuitos de asistencia lingüística. Llame al [phone].    We comply with applicable federal civil rights laws and [place] laws. We do not discriminate on the basis of race, color, national origin, age, disability, sex, sexual orientation, or gender identity.            Thank you!     Thank you for choosing [facility] ORTHOPEDIC SURGERY  for your care. Our goal is always to provide you with excellent care. Hearing back from our patients is one way we can continue to improve our services. Please take a few minutes to complete the written survey that you may receive in the mail after your visit with us. Thank you!             Your Updated Medication List - Protect others around you: Learn how to safely use, store and throw away your medicines at www.disposemymeds.org.          This list is accurate as of 5/10/18 10:03 AM.  Always use your most recent med list.                   Brand Name Dispense Instructions for use Diagnosis    diazepam 10 MG tablet    VALIUM    1 tablet    Take 1 tablet (10 mg) by mouth every 6 hours as needed for anxiety or sleep Take 30-60 minutes before procedure.  Do not operate a vehicle after taking this medication.    Lumbar radiculopathy, right        gabapentin 300 MG capsule    NEURONTIN    180 capsule    Take 1 capsule (300 mg) by mouth 2 times daily    Weakness of both lower extremities, Bilateral leg pain       HYDROcodone-acetaminophen 5-325 MG per tablet    NORCO    30 tablet    Take 1 tablet by mouth At Bedtime    Bilateral leg pain       lisinopril 5 MG tablet    PRINIVIL/ZESTRIL    90 tablet    Take 1 tablet (5 mg) by mouth daily    HTN, goal below 140/90       order for DME     2 Package    Equipment being ordered: Compression stockings 20-30mmHg    Lower extremity edema

## 2018-05-13 ASSESSMENT — ENCOUNTER SYMPTOMS: FOCAL WEAKNESS: 1

## 2018-05-14 ENCOUNTER — HOSPITAL ENCOUNTER (OUTPATIENT)
Dept: MRI IMAGING | Facility: CLINIC | Age: 73
End: 2018-05-14
Attending: ORTHOPAEDIC SURGERY
Payer: COMMERCIAL

## 2018-05-14 DIAGNOSIS — M54.16 LUMBAR RADICULOPATHY, RIGHT: ICD-10-CM

## 2018-05-14 DIAGNOSIS — G95.9 SPINAL CORD DISORDER (H): ICD-10-CM

## 2018-05-14 PROCEDURE — 72141 MRI NECK SPINE W/O DYE: CPT

## 2018-05-14 PROCEDURE — 72148 MRI LUMBAR SPINE W/O DYE: CPT

## 2018-05-15 ENCOUNTER — PATIENT OUTREACH (OUTPATIENT)
Dept: CARE COORDINATION | Facility: CLINIC | Age: 73
End: 2018-05-15

## 2018-05-15 NOTE — PROGRESS NOTES
Clinic Care Coordination Contact    Clinic Care Coordination Contact  OUTREACH  Talked to patient.   Referral Information:    Chief Complaint   Patient presents with     Clinic Care Coordination - Follow-up        Universal Utilization: Frustrated at the lack of progress with addressing her pain and having people tell her to go to ED and they don't do anything to address her pain.     Utilization    Last refreshed: 5/15/2018 12:42 AM:  No Show Count (past year) 1       Last refreshed: 5/15/2018 12:42 AM:  ED visits 2       Last refreshed: 5/15/2018 12:42 AM:  Hospital admissions 0          Current as of: 5/15/2018 12:42 AM           Clinical Concerns:  Current Medical Concerns:  Had MRI imaging done yesterday and needs to have hip replacement.  She also has a pinched nerve in her back. She woke up today stiff and couldn't get out of her chair without a lot of effort by Leo.  She is stiff and in pain.  The orthopedic doctor prescribed additional Gabapentin and she took it and thinks it may have caused her stiffness.  She does not want to take more than one as it doesn't help much anyway.  She has some of the pain medication from Dr. Daley and takes one before bed, but doesn't think it helps either. May stop taking both to see if it helps her out.  She called orthopedics today to discuss her pain and they told her that until she is seen by the [facility] team, they cannot help her.  One of the doctors said that her back issue and hip problem could be the cause of the tingling in her hands and other issues.  She hopes that having hip replacement done could solve some of her pain.        Current Behavioral Concerns: Anxious.  Feeling abandoned.      Education Provided to patient: CC can be called when she doesn't know what to do.       Health Maintenance Reviewed:    Clinical Pathway: None    Medication Management:  Has pain medications to use, but doesn't find them helpful. They don't help her sleep or take away her  pain.  She did take two oxycodones yesterday prior to the imaging.      Functional Status:  She was assessed for a new wheelchair by [facility] and does not know what is happening with that. They looked around for a larger sized wheelchair through friends and the [employer], but cannot locate one.  CC offered to check with [facility] to learn the status.     Living Situation:  Leo is home with her and helping her as needed.  He could return to work if she is able to stay alone in the future.      Diet/Exercise/Sleep:  Sleeping in her chair.  No exercise.      Transportation:  Leo drove her to imaging appt yesterday and she was screaming while he put her into the car as she was having so much pain. He was concerned that someone would call the police as they could think he was hurting her.  Discussed calling medical ride to get to May 24th appointment and she will call [facility] to discuss costs and details.       Psychosocial:   She had [facility] assessment on May 10th with [facility] and expects to hear something from them in the mail. She may have a 15% monthly charge due to her income and assets.  She is willing to work with them as they could provide services for her to stay in her home.  She thinks that she can stay safe in her home as she attends the upcoming appointments.  She asked if we allow people to have this type of pain and not do anything. CC explained that unfortunately, many people live with chronic pain and that she is doing very well with finding out the cause of her pain, so interventions can be sought.      Financial/Insurance:   Not addressed during this call.      Resources and Interventions:  Current Resources:   None.      Goals:   Goals        General    Pain Management (pt-stated)     Notes - Note created  4/5/2018  2:53 PM by Francisca Hernandez LSW    Goal Statement: I will have less pain.  Measure of Success: Will have intervention that addresses my pain.   Supportive Steps to Achieve: CC will contact PCP to  discuss patient concerns and next steps.   Barriers: Cannot leave the house without extreme effort. Is having extreme pain and does not have any sense that it will get better. Her  is her caregiver and he is frustrated with her pain.  Strengths: Has  who supports her.    Date to Achieve By: 9-1-2018              Patient/Caregiver understanding: She will call to set up a ride for medical appointment on May 24th.  CC to call on May 21st to assess her needs.   CC called Radha Sepulveda OT from [facility] and left a message asking for a call back to discuss wheelchair order.        Future Appointments              In 1 week Elma Hill APRN CNP [facility], [facility]          Plan: CC to call on May 21 and patient to call CC if needs arise prior to that outreach.      Francisca Hernandez,   [facility]  [email or web address]  [phone]

## 2018-05-16 NOTE — PROGRESS NOTES
Clinic Care Coordination Contact  Care Team Conversations  VM from  OT Radha.  She has completed the paper work that she can do. [employer] is the DME working on it. The contact there is Saskia Luna, and all questions should be directed to her.  Radha told patient it could take 6 months to get a new wheelchair.      Called Handi and left VM for Saskia asking her to call CC or patient to give a status update about ETA.      Received VM from patient asking for an update on WC.  Called patient and reviewed information above. She understands it will take time.  She has not called about a medical ride yet, but will do so soon.    VM from [facility] that Saskia has received the justification from OT and they will review for completeness. Then it goes to PCP for signature.  She called patient and left this information on her VM.     Plan- continue to assist patient through care coordination.  Call on May 21.    Francisca Hernandez,   [facility]  [email or web address]  [phone]

## 2018-05-21 ENCOUNTER — PATIENT OUTREACH (OUTPATIENT)
Dept: CARE COORDINATION | Facility: CLINIC | Age: 73
End: 2018-05-21

## 2018-05-21 NOTE — PROGRESS NOTES
Clinic Care Coordination Contact    Call from patient asking what the appointment will be for on May 24th with the [facility] team. Reviewed that she will be discussing her pain and possible treatments.  She would like to get admitted to hospital and CC educated her that action is likely not going to happen.  She should expect to discuss her back and hip pain and to develop a plan for treatment. It is unlikely that there will be an intervention. This will likely be a consultation and she will go home afterwards.     She continues to be frustrated with the slow development of interventions to treat her pain.  She is also frustrated that she does not have a wheelchair that fits her.  She knows that getting a new WC takes months.      Reviewed details of May 24th appointment.      Plan- contact patient next week to discuss next steps.      Francisca Hernandez,   [facility]  [email or web address]  [phone]

## 2018-05-22 ENCOUNTER — TELEPHONE (OUTPATIENT)
Dept: FAMILY MEDICINE | Facility: CLINIC | Age: 73
End: 2018-05-22

## 2018-05-22 DIAGNOSIS — M16.0 OSTEOARTHRITIS OF BOTH HIPS, UNSPECIFIED OSTEOARTHRITIS TYPE: ICD-10-CM

## 2018-05-22 DIAGNOSIS — R29.898 WEAKNESS OF BOTH LOWER EXTREMITIES: ICD-10-CM

## 2018-05-22 DIAGNOSIS — M79.605 BILATERAL LEG PAIN: Primary | ICD-10-CM

## 2018-05-22 DIAGNOSIS — M79.604 BILATERAL LEG PAIN: Primary | ICD-10-CM

## 2018-05-22 DIAGNOSIS — M47.816 OSTEOARTHRITIS OF LUMBAR SPINE, UNSPECIFIED SPINAL OSTEOARTHRITIS COMPLICATION STATUS: ICD-10-CM

## 2018-05-22 NOTE — TELEPHONE ENCOUNTER
Routing to PCP as FYI:    Patient called. [facility] [employer] [place] requesting date of last appointment and why she needs a w/c. They told her they will send [employer] paper work that must be completed for review of coverage. Ирина [phone] will be sending the papers.     Provided our fax number if they want to fax the paperwork. Provided her with the dates of her last visit. She requested this message be forwarded to [employer].    Keena CHAPMAN Triage RN

## 2018-05-24 ENCOUNTER — OFFICE VISIT (OUTPATIENT)
Dept: ORTHOPEDICS | Facility: CLINIC | Age: 73
End: 2018-05-24
Payer: COMMERCIAL

## 2018-05-24 ENCOUNTER — HOSPITAL ENCOUNTER (OUTPATIENT)
Dept: LAB | Facility: CLINIC | Age: 73
Discharge: HOME | End: 2018-05-24
Attending: ORTHOPAEDIC SURGERY | Admitting: ORTHOPAEDIC SURGERY
Payer: COMMERCIAL

## 2018-05-24 ENCOUNTER — OFFICE VISIT (OUTPATIENT)
Dept: NEUROSURGERY | Facility: CLINIC | Age: 73
End: 2018-05-24
Attending: NURSE PRACTITIONER
Payer: COMMERCIAL

## 2018-05-24 ENCOUNTER — TELEPHONE (OUTPATIENT)
Dept: ORTHOPEDICS | Facility: CLINIC | Age: 73
End: 2018-05-24

## 2018-05-24 VITALS — DIASTOLIC BLOOD PRESSURE: 72 MMHG | SYSTOLIC BLOOD PRESSURE: 132 MMHG

## 2018-05-24 VITALS
HEIGHT: 66 IN | OXYGEN SATURATION: 97 % | BODY MASS INDEX: 42.27 KG/M2 | HEART RATE: 81 BPM | WEIGHT: 263 LBS | DIASTOLIC BLOOD PRESSURE: 70 MMHG | SYSTOLIC BLOOD PRESSURE: 132 MMHG

## 2018-05-24 DIAGNOSIS — A52.11 TABES DORSALIS: Primary | ICD-10-CM

## 2018-05-24 DIAGNOSIS — M16.11 OSTEOARTHRITIS OF RIGHT HIP, UNSPECIFIED OSTEOARTHRITIS TYPE: ICD-10-CM

## 2018-05-24 DIAGNOSIS — M25.551 RIGHT HIP PAIN: Primary | ICD-10-CM

## 2018-05-24 PROCEDURE — 86780 TREPONEMA PALLIDUM: CPT | Mod: 91 | Performed by: ORTHOPAEDIC SURGERY

## 2018-05-24 PROCEDURE — 86592 SYPHILIS TEST NON-TREP QUAL: CPT | Performed by: ORTHOPAEDIC SURGERY

## 2018-05-24 PROCEDURE — 86780 TREPONEMA PALLIDUM: CPT | Performed by: ORTHOPAEDIC SURGERY

## 2018-05-24 PROCEDURE — 36415 COLL VENOUS BLD VENIPUNCTURE: CPT | Performed by: ORTHOPAEDIC SURGERY

## 2018-05-24 PROCEDURE — 99203 OFFICE O/P NEW LOW 30 MIN: CPT | Performed by: NURSE PRACTITIONER

## 2018-05-24 PROCEDURE — G0463 HOSPITAL OUTPT CLINIC VISIT: HCPCS | Performed by: NURSE PRACTITIONER

## 2018-05-24 PROCEDURE — 99213 OFFICE O/P EST LOW 20 MIN: CPT | Performed by: ORTHOPAEDIC SURGERY

## 2018-05-24 ASSESSMENT — PAIN SCALES - GENERAL: PAINLEVEL: SEVERE PAIN (7)

## 2018-05-24 NOTE — LETTER
5/24/2018         RE: Cristy Bailey  [address]  [place] [address]        Dear Colleague,    Thank you for referring your patient, Cristy Bailey, to the [facility] ORTHOPEDIC SURGERY. Please see a copy of my visit note below.    HISTORY OF PRESENT ILLNESS:    Cristy Bailey is a 72 year old female who is seen in follow up for right hip and leg pain. Patient was seen for at [facility] for consult by NICKI Kay today and nerve impingement, cord compression and disc extrusion were ruled out as cause of symptoms.   Present symptoms: pain, radiating pain, peak pain 10/10, current pain 7/10, increased pain with weight bearing, numbness and tingling into the legs, and right hand.   Treatments tried to this point: Tylenol, Gabapentin, X-ray, MRI,      PHYSICAL EXAM:  /72 (BP Location: Right arm, Patient Position: Chair, Cuff Size: Adult Regular)  There is no height or weight on file to calculate BMI.   GENERAL APPEARANCE: healthy, alert, no distress and moderate distress   SKIN: no suspicious lesions or rashes  NEURO: Normal strength and tone, mentation intact, speech normal and gait abnormal unable to ambulate secondary to pain and weakness.  VASCULAR:  good pulses, and cappillary refill   LYMPH: no lymphadenopathy   PSYCH:  mentation appears normal, affect normal/bright and crying    MSK:  The patient ambulates without an antalgic gait.  The patient is able to get on and off the exam table without difficulty.    Examination of the spine reveals a normal lordosis to the cervical and lumbar spine, and a normal kyphosis to the thoracic spine.  There is no clinical evidence of scoliosis.    The pelvis is clinically level.  Trendelenberg is negative.  The patient is non-tender to palpation over the greater trochanteric bursal region or piriformis fossa.  With the hip flexed to 90 degrees, internal and external rotation is 20/40 respectively,  with no pain.  The calves and  thighs are symmetric, without atrophy and non-tender to palpation. Leilani's sign is negative, bilaterally.   CMS is intact to the toes.       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  I am going to test for evidence of syphilis.    RPR with Reflex titer and confirmation, and T pallidum AB (FTA-Ab) labs were ordered   An order was placed for right hip intra-articular injection was placed through Ortho .     Ryan Garcia MD  Dept. Orthopedic Surgery  [facility]         Again, thank you for allowing me to participate in the care of your patient.        Sincerely,        Bry Garcia MD

## 2018-05-24 NOTE — PATIENT INSTRUCTIONS
Plan:  Referral for right hip intra-articular injection was placed today. Ortho  will be calling you to schedule the appointment.   Labs were also ordered today.

## 2018-05-24 NOTE — MR AVS SNAPSHOT
After Visit Summary   5/24/2018    Cristy Bailey    MRN: [id number]           Patient Information     Date Of Birth          1945        Visit Information        Provider Department      5/24/2018 11:20 AM Elma Hill APRN CNP [facility]        Today's Diagnoses     Right hip pain    -  1      Care Instructions    1.  Schedule to see Dr. Garcia. No spinal cause for leg pain.            Follow-ups after your visit        Additional Services     ORTHO  REFERRAL       [facility] is referring you to the Orthopedic  Services at [facility].       The  Representative will assist you in the coordination of your Orthopedic and Musculoskeletal Care as prescribed by your physician.    The  Representative will call you within 1 business day to help schedule your appointment, or you may contact the  Representative at:    All areas ~ [phone]     Type of Referral : Surgical / Specialist  Right hip pain       Timeframe requested: 3 - 5 days    Coverage of these services is subject to the terms and limitations of your health insurance plan.  Please call member services at your health plan with any benefit or coverage questions.      If X-rays, CT or MRI's have been performed, please contact the facility where they were done to arrange for , prior to your scheduled appointment.  Please bring this referral request to your appointment and present it to your specialist.                  Your next 10 appointments already scheduled     May 24, 2018 11:20 AM CDT   New Visit with ISRAEL Cota CNP   [facility] ([facility])    [address] [address] [address] [phone]              Who to contact     If you have questions or need follow up information about today's clinic visit or your schedule please contact  "[facility] directly at [phone].  Normal or non-critical lab and imaging results will be communicated to you by MyChart, letter or phone within 4 business days after the clinic has received the results. If you do not hear from us within 7 days, please contact the clinic through MyChart or phone. If you have a critical or abnormal lab result, we will notify you by phone as soon as possible.  Submit refill requests through [email or web address] or call your pharmacy and they will forward the refill request to us. Please allow 3 business days for your refill to be completed.          Additional Information About Your Visit        [employer] Information     [email or web address] lets you send messages to your doctor, view your test results, renew your prescriptions, schedule appointments and more. To sign up, go to [email or web address] . Click on \"Log in\" on the left side of the screen, which will take you to the Welcome page. Then click on \"Sign up Now\" on the right side of the page.     You will be asked to enter the access code listed below, as well as some personal information. Please follow the directions to create your username and password.     Your access code is: [email or web address]  Expires: 2018  2:36 PM     Your access code will  in 90 days. If you need help or a new code, please call your [facility] clinic or [phone].        Care EveryWhere ID     This is your Care EveryWhere ID. This could be used by other organizations to access your [facility] medical records  [id number]        Your Vitals Were     Pulse Height Pulse Oximetry BMI (Body Mass Index)          81 5' 6\" (1.676 m) 97% 42.45 kg/m2         Blood Pressure from Last 3 Encounters:   18 132/70   05/10/18 140/80   05/10/18 140/80    Weight from Last 3 Encounters:   18 263 lb (119.3 kg)   05/10/18 263 lb (119.3 kg)   05/10/18 263 lb (119.3 kg)              We Performed the Following     ORTHO  REFERRAL        Primary Care " Provider Office Phone # Fax #    Manoj Robert Daley PA-C [phone]       [address] [address] [facility] [facility] [address]        Goals        General    Pain Management (pt-stated)     Notes - Note created  4/5/2018  2:53 PM by Francisca Hernandez LSW    Goal Statement: I will have less pain.  Measure of Success: Will have intervention that addresses my pain.   Supportive Steps to Achieve: CC will contact PCP to discuss patient concerns and next steps.   Barriers: Cannot leave the house without extreme effort. Is having extreme pain and does not have any sense that it will get better. Her  is her caregiver and he is frustrated with her pain.  Strengths: Has  who supports her.    Date to Achieve By: 9-1-2018        Equal Access to Services     MICHELLE WISE : Janet Martin, waaxda luqadaha, qaybta kaalmacarrie mendez, larry [place]. So [facility] [phone].    ATENCIÓN: Si habla español, tiene a pompa disposición servicios gratuitos de asistencia lingüística. Llame al [phone].    We comply with applicable federal civil rights laws and [place] laws. We do not discriminate on the basis of race, color, national origin, age, disability, sex, sexual orientation, or gender identity.            Thank you!     Thank you for choosing [facility]  for your care. Our goal is always to provide you with excellent care. Hearing back from our patients is one way we can continue to improve our services. Please take a few minutes to complete the written survey that you may receive in the mail after your visit with us. Thank you!             Your Updated Medication List - Protect others around you: Learn how to safely use, store and throw away your medicines at www.disposemymeds.org.          This list is accurate as of 5/24/18 11:03 AM.  Always use your most recent med list.                   Brand Name Dispense Instructions for use Diagnosis    diazepam  10 MG tablet    VALIUM    1 tablet    Take 1 tablet (10 mg) by mouth every 6 hours as needed for anxiety or sleep Take 30-60 minutes before procedure.  Do not operate a vehicle after taking this medication.    Lumbar radiculopathy, right       gabapentin 300 MG capsule    NEURONTIN    180 capsule    Take 1 capsule (300 mg) by mouth 2 times daily    Weakness of both lower extremities, Bilateral leg pain       HYDROcodone-acetaminophen 5-325 MG per tablet    NORCO    30 tablet    Take 1 tablet by mouth At Bedtime    Bilateral leg pain       lisinopril 5 MG tablet    PRINIVIL/ZESTRIL    90 tablet    Take 1 tablet (5 mg) by mouth daily    HTN, goal below 140/90       order for DME     2 Package    Equipment being ordered: Compression stockings 20-30mmHg    Lower extremity edema

## 2018-05-24 NOTE — NURSING NOTE
"Cristy Bailey is a 72 year old female who presents for:  Chief Complaint   Patient presents with     Neurologic Problem     Lumbar radiculopathy, right hip and leg pain unable to stand, right shin and foot with N/T         Vitals:    Vitals:    05/24/18 1046   BP: 132/70   BP Location: Right arm   Patient Position: Sitting   Cuff Size: Adult Large   Pulse: 81   SpO2: 97%   Weight: 263 lb (119.3 kg)   Height: 5' 6\" (1.676 m)       BMI:  Estimated body mass index is 42.45 kg/(m^2) as calculated from the following:    Height as of this encounter: 5' 6\" (1.676 m).    Weight as of this encounter: 263 lb (119.3 kg).    Pain Score:  Severe Pain (7)      Do you feel safe in your environment?  Yes      Stephanie Zamora          "

## 2018-05-24 NOTE — PROGRESS NOTES
Dr. Chris Gaytan  [facility]  Neurosurgery Clinic Visit        CC: low back and right leg pain    Primary care Provider: Manoj Daley      Reason For Visit:   I was asked by Dr. Daley to consult on the patient for lumbar radicular pain on the right.      HPI: Cristy Bailey is a 72 year old female with lumbar radicular pain.   She notes that she has had this pain for over 15 years ago.  He states that the pain has been increasing.  She notes pain to her low back with radicular pain down her right hip, lateral thigh and to the foot.  She notes numbness and tingling to her right leg as well. She has not had any recent PT or injections for her pain.  The pt reports that she can not stand on her right leg. Her spouse helps her transfer to and from the chair.  She has been to neurology and was told that nothing was wrong. She notes right hand numbness as well.  She is very tearful.          Pain at its worst 10  Pain right now:  7    Past Medical History:   Diagnosis Date     HTN (hypertension) 1/2010      Leg weakness 3/24/2015     Lyme disease 1980'S AND 2004    lYME DZ LIKE SXS RESPONDED TO ABX       Past Medical History reviewed with patient during visit.    Past Surgical History:   Procedure Laterality Date     BIOPSY OF UTERUS LINING  3/2010    negative.      C C-SEC ONLY,PREV C-SEC      c-sec x 3      COLONOSCOPY  2/21/10    benign findings. advised 10 yr f/u.      Past Surgical History reviewed with patient during visit.    Current Outpatient Prescriptions   Medication     gabapentin (NEURONTIN) 300 MG capsule     HYDROcodone-acetaminophen (NORCO) 5-325 MG per tablet     lisinopril (PRINIVIL/ZESTRIL) 5 MG tablet     diazepam (VALIUM) 10 MG tablet     order for DME     No current facility-administered medications for this visit.        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 Yes     Social History Narrative       Family History   Problem Relation Age of Onset     CEREBROVASCULAR DISEASE Father      -stroke at age 80     Family History Negative Mother       Diedn her 80's of unknown causes.  Pt otherwise unaware of her health hx.      Unknown/Adopted Mother      DIABETES Brother      (Christian)  of DM complications at age 50.     Alcohol/Drug Brother      Christian     C.A.D. Brother      Christian.      HEART DISEASE Brother      Christian--MI age 50.     Family History Negative Brother      Family History Negative Brother      Family History Negative Sister      Family History Negative Sister      Family History Negative Son      Family History Negative Daughter      Family History Negative Daughter          Review Of Systems  Skin: negative  Eyes: negative  Ears/Nose/Throat: negative  Respiratory: No shortness of breath, dyspnea on exertion, cough, or hemoptysis  Cardiovascular: HTN/HLD  Gastrointestinal: negative  Genitourinary: negative  Musculoskeletal: back pain  Neurologic: bilateral leg pain right greater than left   Psychiatric: negative  Hematologic/Lymphatic/Immunologic: negative  Endocrine: negative     ROS: 10 point ROS neg other than the symptoms noted above in the HPI.      Vital Signs: /70 (BP Location: Right arm, Patient Position: Sitting, Cuff Size: Adult Large)  Pulse 81  Ht 5' 6\" (1.676 m)  Wt 263 lb (119.3 kg)  SpO2 97%  BMI 42.45 kg/m2    Examination:  Constitutional:  Alert, well nourished, NAD.  Memory: recent and " remote memory intact  HEENT: Normocephalic, atraumatic.   Pulm:  Without shortness of breath   CV:  No pitting edema of BLE.    Neurological:  Awake  Alert  Oriented x 3  Speech clear  Cranial nerves II - XII intact  PERRL  EOMI  Face symmetric  Tongue midline  Motor exam   Shoulder Abduction:  Right:  5/5   Left:  5/5  Biceps:                      Right:  5/5   Left:  5/5  Triceps:                     Right:  5/5   Left:  5/5  Wrist Extensors:       Right:  5/5   Left:  5/5  Wrist Flexors:           Right:  5/5   Left:  5/5  Intrinsics:                   Right:  5/5   Left:  5/5  Difficulty with moving LE due to pain.      Sensation normal to bilateral upper and lower extremities  Muscle tone to bilateral upper and lower extremities normal   Gait: Pt presents in wheelchair    Lumbar examination reveals severe tenderness of the spine and paraspinous muscles with light palpation.  Hip height is symmetrical. Pt has severe pain to right leg with light touch or any movement.      Pain with ROM and palpation of the right hip.      Imaging:     IMPRESSION:  1. Multilevel degenerative changes. Mild dextroscoliosis.  2. L4-L5: Mild central stenosis.        Assessment/Plan:     Cristy Bailey is a 72 year old female with lumbar radicular pain.   She notes that she has had this pain for over 15 years ago.  He states that the pain has been increasing.  She notes pain to her low back with radicular pain down her right hip, lateral thigh and to the foot.  She notes numbness and tingling to her right leg as well. She has not had any recent PT or injections for her pain.  The pt reports that she can not stand on her right leg. Her spouse helps her transfer to and from the chair.  She has been to neurology and was told that nothing was wrong. She notes right hand numbness as well.  She is very tearful.  She states that she has not been walking for 6 months.  The pts lumbar MRI was reviewed in detail. No nerve impingement, cord  compression or extrusions noted that would correlate with her pain.  Her previous pelvic xray shows collapse of right femoral head.  I recommend she return to see orthopedics. The pt is very tearful and stated that she thought she was seeing them today. She reports it is very difficult for her to get to appointments.  I will have her see if she can see Dr. Garcia today.        Patient Instructions   1.  Schedule to see Dr. Garcia. No spinal cause for leg pain.               [facility]  [facility]  [address] [address]    Tel [phone]  Pager [phone]

## 2018-05-24 NOTE — PROGRESS NOTES
HISTORY OF PRESENT ILLNESS:    Cristy Bailey is a 72 year old female who is seen in follow up for right hip and leg pain. Patient was seen for at [facility] for consult by NICKI Kay today and nerve impingement, cord compression and disc extrusion were ruled out as cause of symptoms.   Present symptoms: pain, radiating pain, peak pain 10/10, current pain 7/10, increased pain with weight bearing, numbness and tingling into the legs, and right hand.   Treatments tried to this point: Tylenol, Gabapentin, X-ray, MRI,      PHYSICAL EXAM:  /72 (BP Location: Right arm, Patient Position: Chair, Cuff Size: Adult Regular)  There is no height or weight on file to calculate BMI.   GENERAL APPEARANCE: healthy, alert, no distress and moderate distress   SKIN: no suspicious lesions or rashes  NEURO: Normal strength and tone, mentation intact, speech normal and gait abnormal unable to ambulate secondary to pain and weakness.  VASCULAR:  good pulses, and cappillary refill   LYMPH: no lymphadenopathy   PSYCH:  mentation appears normal, affect normal/bright and crying    MSK:  The patient ambulates without an antalgic gait.  The patient is able to get on and off the exam table without difficulty.    Examination of the spine reveals a normal lordosis to the cervical and lumbar spine, and a normal kyphosis to the thoracic spine.  There is no clinical evidence of scoliosis.    The pelvis is clinically level.  Trendelenberg is negative.  The patient is non-tender to palpation over the greater trochanteric bursal region or piriformis fossa.  With the hip flexed to 90 degrees, internal and external rotation is 20/40 respectively,  with no pain.  The calves and thighs are symmetric, without atrophy and non-tender to palpation. Leilani's sign is negative, bilaterally.   CMS is intact to the toes.       IMAGING INTERPRETATION:       ASSESSMENT / PLAN: Significant osteoarthritis of bilateral hips which appears to be  neuropathic in origin.  I am going to test for evidence of syphilis.    RPR with Reflex titer and confirmation, and T pallidum AB (FTA-Ab) labs were ordered   An order was placed for right hip intra-articular injection was placed through Ortho .     Ryan Garcia MD  Dept. Orthopedic Surgery  [facility]

## 2018-05-24 NOTE — MR AVS SNAPSHOT
After Visit Summary   5/24/2018    Cristy Bailey    MRN: [id number]           Patient Information     Date Of Birth          1945        Visit Information        Provider Department      5/24/2018 1:00 PM Bry Garcia MD [facility] ORTHOPEDIC SURGERY        Today's Diagnoses     Tabes dorsalis    -  1    Osteoarthritis of right hip, unspecified osteoarthritis type          Care Instructions    Plan:  Referral for right hip intra-articular injection was placed today. Ortho  will be calling you to schedule the appointment.   Labs were also ordered today.               Follow-ups after your visit        Additional Services     ORTHO  REFERRAL       [facility] is referring you to the Orthopedic  Services at [facility].       The  Representative will assist you in the coordination of your Orthopedic and Musculoskeletal Care as prescribed by your physician.    The  Representative will call you within 1 business day to help schedule your appointment, or you may contact the  Representative at:    All areas ~ [phone]    Type of Referral : right hip intra-articular injection- Jessy/Pete       Timeframe requested: Routine    Coverage of these services is subject to the terms and limitations of your health insurance plan.  Please call member services at your health plan with any benefit or coverage questions.      If X-rays, CT or MRI's have been performed, please contact the facility where they were done to arrange for , prior to your scheduled appointment.  Please bring this referral request to your appointment and present it to your specialist.                  Your next 10 appointments already scheduled     Jun 28, 2018  9:00 AM CDT   (Arrive by 8:45 AM)   EMG with Navneet Pina MD   [facility] EMG ([facility])    [address] [place] [address]  Floor  [facility] [address]   [phone]            Jun 28, 2018  9:30 AM CDT   (Arrive by 9:15 AM)   EMG with Navneet Pina MD   [facility]G ([facility])    [address]  3rd Floor  [facility] [address]   [phone]            Jun 29, 2018 10:40 AM CDT   (Arrive by 10:25 AM)   NEW HAND with Bry Ball MD   [facility] ([facility])    [address]  4th Floor  [facility] [address]   [phone]            Jul 01, 2018 10:45 AM CDT   MR THORACIC SPINE W/O & W CONTRAST with [id number]   [facility] [place] [facility] ([facility])    [address]  1st Floor  [facility] [address]   [phone]           Take your medicines as usual, unless your doctor tells you not to. Bring a list of your current medicines to your exam (including vitamins, minerals and over-the-counter drugs).  You may or may not receive intravenous (IV) contrast for this exam pending the discretion of the Radiologist.  You do not need to do anything special to prepare.  The MRI machine uses a strong magnet. Please wear clothes without metal (snaps, zippers). A sweatsuit works well, or we may give you a hospital gown.  Please remove any body piercings and hair extensions before you arrive. You will also remove watches, jewelry, hairpins, wallets, dentures, partial dental plates and hearing aids. You may wear contact lenses, and you may be able to wear your rings. We have a safe place to keep your personal items, but it is safer to leave them at home.  **IMPORTANT** THE INSTRUCTIONS BELOW ARE ONLY FOR THOSE PATIENTS WHO HAVE BEEN PRESCRIBED SEDATION OR GENERAL ANESTHESIA DURING THEIR MRI PROCEDURE:  IF YOUR DOCTOR PRESCRIBED ORAL SEDATION (take medicine to help you relax during your exam):   You must get the medicine from your doctor (oral medication) before you arrive. Bring the medicine to the exam. Do not  take it at home. You ll be told when to take it upon arriving for your exam.   Arrive one hour early. Bring someone who can take you home after the test. Your medicine will make you sleepy. After the exam, you may not drive, take a bus or take a taxi by yourself.  IF YOUR DOCTOR PRESCRIBED IV SEDATION:   Arrive one hour early. Bring someone who can take you home after the test. Your medicine will make you sleepy. After the exam, you may not drive, take a bus or take a taxi by yourself.   No eating 6 hours before your exam. You may have clear liquids up until 4 hours before your exam. (Clear liquids include water, clear tea, black coffee and fruit juice without pulp.)  IF YOUR DOCTOR PRESCRIBED ANESTHESIA (be asleep for your exam):   Arrive 1 1/2 hours early. Bring someone who can take you home after the test. You may not drive, take a bus or take a taxi by yourself.   No eating 8 hours before your exam. You may have clear liquids up until 4 hours before your exam. (Clear liquids include water, clear tea, black coffee and fruit juice without pulp.)   You will spend four to five hours in the recovery room.  Please call the Imaging Department at your exam site with any questions.            Jul 01, 2018 11:30 AM CDT   MR HIP BILATERAL with [id number]   [facility] ([facility])    [address] [address] [phone]           Take your medicines as usual, unless your doctor tells you not to. Bring a list of your current medicines to your exam (including vitamins, minerals and over-the-counter drugs). Also bring the results of similar scans you may have had.  Please remove any body piercings and hair extensions before you arrive.  Follow your doctor s orders. If you do not, we may have to postpone your exam.  You may or may not receive IV contrast for this exam pending the discretion of the Radiologist.  You do not need to do anything special to  prepare.  The MRI machine uses a strong magnet. Please wear clothes without metal (snaps, zippers). A sweatsuit works well, or we may give you a hospital gown.   **IMPORTANT** THE INSTRUCTIONS BELOW ARE ONLY FOR THOSE PATIENTS WHO HAVE BEEN PRESCRIBED SEDATION OR GENERAL ANESTHESIA DURING THEIR MRI PROCEDURE:  IF YOUR DOCTOR PRESCRIBED ORAL SEDATION (take medicine to help you relax during your exam):   You must get the medicine from your doctor (oral medication) before you arrive. Bring the medicine to the exam. Do not take it at home. You ll be told when to take it upon arriving for your exam.   Arrive one hour early. Bring someone who can take you home after the test. Your medicine will make you sleepy. After the exam, you may not drive, take a bus or take a taxi by yourself.  IF YOUR DOCTOR PRESCRIBED IV SEDATION:   Arrive one hour early. Bring someone who can take you home after the test. Your medicine will make you sleepy. After the exam, you may not drive, take a bus or take a taxi by yourself.   No eating 6 hours before your exam. You may have clear liquids up until 4 hours before your exam. (Clear liquids include water, clear tea, black coffee and fruit juice without pulp.)  IF YOUR DOCTOR PRESCRIBED ANESTHESIA (be asleep for your exam):   Arrive 1 1/2 hours early. Bring someone who can take you home after the test. You may not drive, take a bus or take a taxi by yourself.   No eating 8 hours before your exam. You may have clear liquids up until 4 hours before your exam. (Clear liquids include water, clear tea, black coffee and fruit juice without pulp.)   You will spend four to five hours in the recovery room.  Please call the Imaging Department at your exam site with any questions.            Jul 20, 2018 10:00 AM CDT   (Arrive by 9:45 AM)   Return Visit with Johnnie Palafox MD   [facility] ([facility] [place])    [address] [address]    [phone]              Future tests that were ordered for you today     Open Future Orders        Priority Expected Expires Ordered    EMG Routine 6/21/2018 6/21/2019 6/21/2018    EMG Routine  6/21/2019 6/21/2018    MR Hip Bilateral Routine 6/21/2018 6/21/2019 6/21/2018    MRI Thoracic spine w & w/o contrast Routine 6/21/2018 6/21/2019 6/21/2018            Who to contact     If you have questions or need follow up information about today's clinic visit or your schedule please contact [facility] ORTHOPEDIC SURGERY directly at [phone].  Normal or non-critical lab and imaging results will be communicated to you by MyChart, letter or phone within 4 business days after the clinic has received the results. If you do not hear from us within 7 days, please contact the clinic through [employer] or phone. If you have a critical or abnormal lab result, we will notify you by phone as soon as possible.  Submit refill requests through [employer] or call your pharmacy and they will forward the refill request to us. Please allow 3 business days for your refill to be completed.          Additional Information About Your Visit        [employer] Information     [employer] gives you secure access to your electronic health record. If you see a primary care provider, you can also send messages to your care team and make appointments. If you have questions, please call your primary care clinic.  If you do not have a primary care provider, please call [phone] and they will assist you.        Care EveryWhere ID     This is your Care EveryWhere ID. This could be used by other organizations to access your [place] medical records  [id number]         Blood Pressure from Last 3 Encounters:   06/21/18 132/86   06/20/18 141/87   06/08/18 144/82    Weight from Last 3 Encounters:   05/24/18 263 lb (119.3 kg)   05/10/18 263 lb (119.3 kg)   05/10/18 263 lb (119.3 kg)              We Performed the Following     ORTHO  REFERRAL      RPR W/REFLEX TITER & CONFIRM     T pallidum Ab (FTA-Ab) ([facility])        Primary Care Provider Office Phone # Fax #    Manoj Daley PA-C [phone] [address] [place] [address]        Goals        General    Pain Management (pt-stated)     Notes - Note created  4/5/2018  2:53 PM by Francisca Hernandez LSW    Goal Statement: I will have less pain.  Measure of Success: Will have intervention that addresses my pain.   Supportive Steps to Achieve: CC will contact PCP to discuss patient concerns and next steps.   Barriers: Cannot leave the house without extreme effort. Is having extreme pain and does not have any sense that it will get better. Her  is her caregiver and he is frustrated with her pain.  Strengths: Has  who supports her.    Date to Achieve By: 9-1-2018        Equal Access to Services     [facility] Hospital[facility]IP[facility]ANO : Janet Martin, jeyson esposito, elsi mendez, larry davalos . So [facility] [phone].    ATENCIÓN: Si habla español, tiene a pompa disposición servicios gratuitos de asistencia lingüística. Llame al [phone].    We comply with applicable federal civil rights laws and [place] laws. We do not discriminate on the basis of race, color, national origin, age, disability, sex, sexual orientation, or gender identity.            Thank you!     Thank you for choosing [facility] ORTHOPEDIC SURGERY  for your care. Our goal is always to provide you with excellent care. Hearing back from our patients is one way we can continue to improve our services. Please take a few minutes to complete the written survey that you may receive in the mail after your visit with us. Thank you!             Your Updated Medication List - Protect others around you: Learn how to safely use, store and throw away your medicines at www.disposemymeds.org.          This list is accurate as of 5/24/18 11:59 PM.  Always use your most recent  med list.                   Brand Name Dispense Instructions for use Diagnosis    gabapentin 300 MG capsule    NEURONTIN    180 capsule    Take 1 capsule (300 mg) by mouth 2 times daily    Weakness of both lower extremities, Bilateral leg pain       lisinopril 5 MG tablet    PRINIVIL/ZESTRIL    90 tablet    Take 1 tablet (5 mg) by mouth daily    HTN, goal below 140/90       order for DME     2 Package    Equipment being ordered: Compression stockings 20-30mmHg    Lower extremity edema

## 2018-05-24 NOTE — LETTER
5/24/2018         RE: Cristy Bailey  [address]  [place] [address]        Dear Colleague,    Thank you for referring your patient, Cristy Bailey, to the [facility]. Please see a copy of my visit note below.    Dr. Chris Gaytan  [facility]  Neurosurgery Clinic Visit        CC: low back and right leg pain    Primary care Provider: Manoj Daley      Reason For Visit:   I was asked by Dr. Daley to consult on the patient for lumbar radicular pain on the right.      HPI: Cristy Bailey is a 72 year old female with lumbar radicular pain.   She notes that she has had this pain for over 15 years ago.  He states that the pain has been increasing.  She notes pain to her low back with radicular pain down her right hip, lateral thigh and to the foot.  She notes numbness and tingling to her right leg as well. She has not had any recent PT or injections for her pain.  The pt reports that she can not stand on her right leg. Her spouse helps her transfer to and from the chair.  She has been to neurology and was told that nothing was wrong. She notes right hand numbness as well.  She is very tearful.          Pain at its worst 10  Pain right now:  7    Past Medical History:   Diagnosis Date     HTN (hypertension) 1/2010      Leg weakness 3/24/2015     Lyme disease 1980'S AND 2004    lYME DZ LIKE SXS RESPONDED TO ABX       Past Medical History reviewed with patient during visit.    Past Surgical History:   Procedure Laterality Date     BIOPSY OF UTERUS LINING  3/2010    negative.      C C-SEC ONLY,PREV C-SEC      c-sec x 3      COLONOSCOPY  2/21/10    benign findings. advised 10 yr f/u.      Past Surgical History reviewed with patient during visit.    Current Outpatient Prescriptions   Medication     gabapentin (NEURONTIN) 300 MG capsule     HYDROcodone-acetaminophen (NORCO) 5-325 MG per tablet     lisinopril (PRINIVIL/ZESTRIL) 5 MG tablet     diazepam  (VALIUM) 10 MG tablet     order for DME     No current facility-administered medications for this visit.        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 Yes     Social History Narrative       Family History   Problem Relation Age of Onset     CEREBROVASCULAR DISEASE Father      -stroke at age 80     Family History Negative Mother       Diedn her 80's of unknown causes.  Pt otherwise unaware of her health hx.      Unknown/Adopted Mother      DIABETES Brother      (Christian)  of DM complications at age 50.     Alcohol/Drug Brother      Christian     C.A.D. Brother      Christian.      HEART DISEASE Brother      Christian--MI age 50.     Family History Negative Brother      Family History Negative Brother      Family History Negative Sister      Family History Negative Sister      Family History Negative Son      Family History Negative Daughter      Family History Negative Daughter          Review Of Systems  Skin: negative  Eyes: negative  Ears/Nose/Throat: negative  Respiratory: No shortness of breath, dyspnea on exertion, cough, or hemoptysis  Cardiovascular: HTN/HLD  Gastrointestinal: negative  Genitourinary: negative  Musculoskeletal: back pain  Neurologic: bilateral leg pain right greater than left   Psychiatric: negative  Hematologic/Lymphatic/Immunologic: negative  Endocrine: negative     ROS: 10 point ROS neg other than the symptoms noted above in the HPI.      Vital Signs:  "/70 (BP Location: Right arm, Patient Position: Sitting, Cuff Size: Adult Large)  Pulse 81  Ht 5' 6\" (1.676 m)  Wt 263 lb (119.3 kg)  SpO2 97%  BMI 42.45 kg/m2    Examination:  Constitutional:  Alert, well nourished, NAD.  Memory: recent and remote memory intact  HEENT: Normocephalic, atraumatic.   Pulm:  Without shortness of breath   CV:  No pitting edema of BLE.    Neurological:  Awake  Alert  Oriented x 3  Speech clear  Cranial nerves II - XII intact  PERRL  EOMI  Face symmetric  Tongue midline  Motor exam   Shoulder Abduction:  Right:  5/5   Left:  5/5  Biceps:                      Right:  5/5   Left:  5/5  Triceps:                     Right:  5/5   Left:  5/5  Wrist Extensors:       Right:  5/5   Left:  5/5  Wrist Flexors:           Right:  5/5   Left:  5/5  Intrinsics:                   Right:  5/5   Left:  5/5  Difficulty with moving LE due to pain.      Sensation normal to bilateral upper and lower extremities  Muscle tone to bilateral upper and lower extremities normal   Gait: Pt presents in wheelchair    Lumbar examination reveals severe tenderness of the spine and paraspinous muscles with light palpation.  Hip height is symmetrical. Pt has severe pain to right leg with light touch or any movement.      Pain with ROM and palpation of the right hip.      Imaging:     IMPRESSION:  1. Multilevel degenerative changes. Mild dextroscoliosis.  2. L4-L5: Mild central stenosis.        Assessment/Plan:     Cristy Bailey is a 72 year old female with lumbar radicular pain.   She notes that she has had this pain for over 15 years ago.  He states that the pain has been increasing.  She notes pain to her low back with radicular pain down her right hip, lateral thigh and to the foot.  She notes numbness and tingling to her right leg as well. She has not had any recent PT or injections for her pain.  The pt reports that she can not stand on her right leg. Her spouse helps her transfer to and from the chair.  She " has been to neurology and was told that nothing was wrong. She notes right hand numbness as well.  She is very tearful.  She states that she has not been walking for 6 months.  The pts lumbar MRI was reviewed in detail. No nerve impingement, cord compression or extrusions noted that would correlate with her pain.  Her previous pelvic xray shows collapse of right femoral head.  I recommend she return to see orthopedics. The pt is very tearful and stated that she thought she was seeing them today. She reports it is very difficult for her to get to appointments.  I will have her see if she can see Dr. Garcia today.        Patient Instructions   1.  Schedule to see Dr. Garcia. No spinal cause for leg pain.               Elma Hill CNP  [facility]  [address] [address]    Tel [phone]  Pager [phone]      Again, thank you for allowing me to participate in the care of your patient.        Sincerely,        ISRAEL Cota CNP

## 2018-05-24 NOTE — TELEPHONE ENCOUNTER
[facility] called wanting to clarify orders. Is the   T Pallidum AB test for Treponema Antibody with reflex and RPR titer? If so, that is the correct order.   The CL AFF RPR appears to be the same type of test.     Discussed with Dr. Garcia and they are to cancel CL AFF and go ahead with the Treponema Antibody with reflex and RPR titer.     Phone call to lab and informed of the above.     DIEGO Javed RN

## 2018-05-25 ENCOUNTER — TELEPHONE (OUTPATIENT)
Dept: ORTHOPEDICS | Facility: CLINIC | Age: 73
End: 2018-05-25

## 2018-05-25 LAB
RPR SER QL: NONREACTIVE
T PALLIDUM AB SER QL: NONREACTIVE

## 2018-05-25 NOTE — TELEPHONE ENCOUNTER
Patient returned call. Informed lab results won't be back until next week. She has an appointment with Infectious Disease physician on 6/27/18 and wants the results before then. Asked that she call back 5/31/18 to see if we have results.     She also asks if she is supposed to continue the gabapentin 300mg twice daily prescribed by Dr. Garcia previously?    Patient expecting a call back: YES    Phone: [phone]    Ok to leave message: YES on machine, or can give information to whomever answers the phone or Leo Akbar.     Please discuss with Dr. Garcia and advise. Also are we calling with lab results or does patient need to return to the office?    DIEGO Javed RN

## 2018-05-25 NOTE — TELEPHONE ENCOUNTER
Call to patient. Patient was informed per Dr. Garcia patient should continue with Gabapentin 300mg, twice daily as prescribed.     Patient will be notified with lab results next week.     Keira Santiago, ATC

## 2018-05-25 NOTE — TELEPHONE ENCOUNTER
Reason for Call:  Request for results:    Name of test or procedure: blood work     Date of test of procedure: 5/24/18    Results: pending    Plan for results from last OV: unknown    OK to leave the result message on voice mail or with a family member? no consent to communicate on file    Phone number Patient can be reached at:  Home number on file [phone] (home)    Phone call to [facility] and spoke with Lori. Results can take up to 4 days and testing was sent to [place]. Should have results late in the week next week.     Left message with male to have patient return call.     DIEGO Javed RN

## 2018-05-29 ENCOUNTER — PATIENT OUTREACH (OUTPATIENT)
Dept: CARE COORDINATION | Facility: CLINIC | Age: 73
End: 2018-05-29

## 2018-05-29 NOTE — PROGRESS NOTES
Clinic Care Coordination Contact    Clinic Care Coordination Contact  OUTREACH    Referral Information:  Message from patient asking for a call back.        Chief Complaint   Patient presents with     Clinic Care Coordination - Follow-up        Universal Utilization: appropriate utilization     Utilization    Last refreshed: 5/28/2018  2:49 AM:  No Show Count (past year) 1       Last refreshed: 5/28/2018  2:49 AM:  ED visits 2       Last refreshed: 5/28/2018  2:49 AM:  Hospital admissions 0          Current as of: 5/28/2018  2:49 AM             Clinical Concerns:  Current Medical Concerns: Report from patient:  Met with surgeon Garcia last week after consultation with spine clinic.  The NP at the spine clinic could see no spinal cause of leg pain and sent her to consult with Dr. Garcia as her assessment showed it was an orthopedic issue.  Dr. Garcia told her her hips were bad but were not the problem. Dr. Garcia suspects she has syphilis and ordered blood test.  She will learn results this week. She did her own research and she feels that her symptoms match those for syphilis. She has an appointment with infectious disease doctor, Dr. Spencer the end of June. She does not want to wait to start treatment at the end of June.  She thinks she will get peniciliin shots and she does not want to wait to address this until end of June. She expects to learn results of the blood tests this week.  Discussed that until the blood tests show that is her diagnosis, CC will wait to intervene.  She agreed with that course of action.      Current Behavioral Concerns: Anxious to address her illness.      Education Provided to patient: None provided.       Health Maintenance Reviewed:  Not at this encounter.  Clinical Pathway: None    Medication Management:  Not addressed during this encounter.      Functional Status:   Not addressed during this encounter.     Living Situation:   With spouse in their own home.      Diet/Exercise/Sleep:   Not addressed during this encounter.     Transportation:     Difficult to transport due to mobility issues. Spouse drives her in their car.     Psychosocial:   Has had assessment for AC waiver services.      Financial/Insurance:    Limited assets and income. [employer].      Resources and Interventions:  Current Resources:         Goals:   Goals        General    Pain Management (pt-stated)     Notes - Note created  4/5/2018  2:53 PM by Francisca Hernandez LSW    Goal Statement: I will have less pain.  Measure of Success: Will have intervention that addresses my pain.   Supportive Steps to Achieve: CC will contact PCP to discuss patient concerns and next steps.   Barriers: Cannot leave the house without extreme effort. Is having extreme pain and does not have any sense that it will get better. Her  is her caregiver and he is frustrated with her pain.  Strengths: Has  who supports her.    Date to Achieve By: 9-1-2018              Patient/Caregiver understanding: Patient really liked Dr. Garcia and is hopeful that he found the root of her problem.  He explained that he is a surgeon, but has experience with syphilis.      She agreed to call CC when she learns results of blood tests.  CC will also do chart review.  May include PCP for consultation for treatment options after results of blood test are completed.  Patient thinks she will need penicillin injections if she has syphilis.  She does not want to wait until infectious disease appointment in one month to begin treatment.       Plan: Talk to patient this week after getting blood test results.  She thinks she should know in a day or two.      Francisca Hernandez,   [facility]  [email or web address]  [phone]

## 2018-05-30 NOTE — TELEPHONE ENCOUNTER
Patient left voicemail asking for a return call from the nurse.     Phone call to patient. She asks if lab results are back. She is having a lot of pain and anxious for results to come back so she can be treated.   Informed results are not back and we will contact her once they are in. Informed that the lab was not sure but was hoping they would be in by the end of the week.     Ok to leave message on her home number. She was appreciative of return call.     Please watch for lab results.     DIEGO Javed RN

## 2018-05-31 ENCOUNTER — TELEPHONE (OUTPATIENT)
Dept: ORTHOPEDICS | Facility: CLINIC | Age: 73
End: 2018-05-31

## 2018-05-31 ENCOUNTER — TELEPHONE (OUTPATIENT)
Dept: FAMILY MEDICINE | Facility: CLINIC | Age: 73
End: 2018-05-31

## 2018-05-31 DIAGNOSIS — R29.898 WEAKNESS OF BOTH LOWER EXTREMITIES: Primary | ICD-10-CM

## 2018-05-31 NOTE — TELEPHONE ENCOUNTER
Patient states lab tests done by Dr. Kong negative. Viewable in EMR. He is recommending neurology for pain in legs. Please see telephone encounter 5/31 per Dilan.    Patient did see [facility] clinic of neurology before, but she thought you might recommend different neurologist. Patient stated you had discussed this with her before.    C/o extreme pain and is asking for a recommendation for neurologist and any other options.    Ellie Coon RN

## 2018-05-31 NOTE — TELEPHONE ENCOUNTER
Dr. Garcia spoke with Dr. Gaytan and they decided patient should see neurology.   Patient has 2 probably conditions going on. One with hip and one with neuropathy.   If patient has relief from cortisone injection of hip, she may return to discuss possible hip replacement in the future.   Should see neurology for the neuropathy she is having in her legs.    Phone call to patient and informed labs were negative for syphilis and informed of the above recommendations. She states she has an appointment with Infectious Disease in a month. Will discuss with Dr. Garcia and call her back if she needs to cancel that, otherwise will not call her back.   She did not sound like she wanted to return to previous neurologist, so will contact PCP for suggestions.   Asked that she call back for any further questions.   She verbalized understanding.     DIEGO Javed RN

## 2018-05-31 NOTE — TELEPHONE ENCOUNTER
Per Dr. Garcia, ok to give patient lab results ordered on 5/24/18.     Next steps Dr. Garcia recommends following back up with Neurology to determine where neuropathy is originating from.     And complete right hip intra-articular injection with Dr. Freeman/ [facility].     Please inform patient.     Keira Santiago, GREGORY

## 2018-06-01 ENCOUNTER — MYC MEDICAL ADVICE (OUTPATIENT)
Dept: FAMILY MEDICINE | Facility: CLINIC | Age: 73
End: 2018-06-01

## 2018-06-01 DIAGNOSIS — M79.605 BILATERAL LEG PAIN: ICD-10-CM

## 2018-06-01 DIAGNOSIS — M79.604 BILATERAL LEG PAIN: ICD-10-CM

## 2018-06-01 NOTE — PROGRESS NOTES
Clinic Care Coordination Contact    Clinic Care Coordination Contact  OUTREACH    Referral Information: Called and talked to patient.       Chief Complaint   Patient presents with     Clinic Care Coordination - Follow-up        Universal Utilization: Appropriate utilization.      Utilization    Last refreshed: 6/1/2018  8:05 AM:  No Show Count (past year) 1       Last refreshed: 6/1/2018  8:05 AM:  ED visits 2       Last refreshed: 6/1/2018  8:05 AM:  Hospital admissions 0          Current as of: 6/1/2018  8:05 AM             Clinical Concerns:  Current Medical Concerns:  [place] test results were negative for syphilis.  Has set up injection for June 7th for hip pain. Needs to consult with neurology for neuroapthy pain.  PCP referred her to  neurology and to [facility] if  has long wait.  In significant pain.  Will contact PCP to see if she can get additional pain medications.      Current Behavioral Concerns: Frustrated that she continues to have so much pain and no one can help.  Frustrated that she cannot get a wheelchair that fits her.  She understands it is in process.     Education Provided to patient: Worried about finding the  neurology location. She will ask when she calls to set up appointment.      Health Maintenance Reviewed:  Not during this call.     Medication Management:  Not addressed during this call.     Functional Status:   No new needs identified.     Living Situation:   With partner in their home.     Diet/Exercise/Sleep:   No changes identified.     Transportation:   Leo will transport.         Psychosocial:   No changes.      Financial/Insurance:    No changes.      Resources and Interventions:  Current Resources:     Patient did get a packet of information from [facility] and did not look through it due to pain.  CC and patient reviewed the letter from [facility] about the [place] had [place].  She has been approved for the alternative care program as of 5-23 with a 15%  fee.  CC explained how the program will assist her and what to expect. Patient is easily overwhelmed due to pain.  She has been approved for 4 hours a day for care and CC explained that she may be able to pay her partner for some of the care he is providing which would help with financial stress since he quit working.  It may take a few days for a  to be assigned to work with her and she will let CC know if someone calls her to set up initiation of the services.  If she doesn't get a call, CC will call [facility] on June 6th to find out what agency will be doing her case management.       Goals:   Goals        General    Pain Management (pt-stated)     Notes - Note created  4/5/2018  2:53 PM by Francisca Hernandez LSW    Goal Statement: I will have less pain.  Measure of Success: Will have intervention that addresses my pain.   Supportive Steps to Achieve: CC will contact PCP to discuss patient concerns and next steps.   Barriers: Cannot leave the house without extreme effort. Is having extreme pain and does not have any sense that it will get better. Her  is her caregiver and he is frustrated with her pain.  Strengths: Has  who supports her.    Date to Achieve By: 9-1-2018          Patient/Caregiver understanding: Patient will message PCP to discuss her needs for additional pain medication. She will have hip injection on June 7th. She will set up neurology appointment. She will let CC know if she gets a call from her new .  CC will call [facility] on June 6 if patient has not been contacted.        Future Appointments              In 6 days Lynnette Jiménez,  [facility]          Plan: CC to contact [facility] on June 6 if no contact by patient prior to then.     Francisca Hernandez,   [facility]  [email or web address]  [phone]

## 2018-06-01 NOTE — TELEPHONE ENCOUNTER
Please see patient message requesting refill of Norco.    Last refill 5/3/2018 qty 30    Ellie Coon RN

## 2018-06-01 NOTE — TELEPHONE ENCOUNTER
I have placed 2 referrals for neurology - one with the [facility] and one with [facility]. I would like her to try to get in first with the [facility]. If they are too booked out we can try [facility]. Please give her the contact info from the order.

## 2018-06-01 NOTE — TELEPHONE ENCOUNTER
Called patient to notify of below:    Consult at [facility]: Neurology Clinic - [place] [phone]       [facility]: [facility] [place] [phone]    Patient very tearful and nervous and driving in to [place]. Gave her the website address for the  of  (where she prefers to go) as well and advised they usually have driving directions from various driving routes. Also advised when she makes the appointment to let them know she is unsure about directions and they should be able to mail her information on how best to get to the clinic and where to park.  Patient stated understanding.    Keena CHAPMAN, Triage RN

## 2018-06-03 RX ORDER — HYDROCODONE BITARTRATE AND ACETAMINOPHEN 5; 325 MG/1; MG/1
1 TABLET ORAL AT BEDTIME
Qty: 30 TABLET | Refills: 0 | Status: SHIPPED | OUTPATIENT
Start: 2018-06-03 | End: 2018-07-28

## 2018-06-04 ENCOUNTER — MYC MEDICAL ADVICE (OUTPATIENT)
Dept: FAMILY MEDICINE | Facility: CLINIC | Age: 73
End: 2018-06-04

## 2018-06-06 NOTE — PROGRESS NOTES
Clinic Care Coordination Contact  Care Team Conversations    CC called [facility] intake to learn [place]. Was given Lillie Wright's VM as she is the , [phone], and learned she is out of the office until June 11th.  Talked to coverage worker Elma. Patient will have alternative care services and she will note CC involvement.  They need to know who the PA's partner MD is.      Call to patient and gave her this information and reviewed what this program will do for her.  She is scheduled for hip injection tomorrow.  Discussed her need for bariatric wheelchair. She has bought two wheelchairs from [employer] and neither fit and she cannot afford to return by mail.  They are going to try the [employer] in  and the [employer].  Gave her the number for [facility] office as they sometimes have used equipment for sale.      Plan- CC to call next week to see how injection is working and to see if L[facility] has contacted patient.      Francisca Hernandez,   [facility]  [email or web address]  [phone]

## 2018-06-07 ENCOUNTER — OFFICE VISIT (OUTPATIENT)
Dept: ORTHOPEDICS | Facility: CLINIC | Age: 73
End: 2018-06-07
Payer: COMMERCIAL

## 2018-06-07 ENCOUNTER — TELEPHONE (OUTPATIENT)
Dept: ORTHOPEDICS | Facility: CLINIC | Age: 73
End: 2018-06-07

## 2018-06-07 DIAGNOSIS — M16.11 PRIMARY OSTEOARTHRITIS OF RIGHT HIP: Primary | ICD-10-CM

## 2018-06-07 DIAGNOSIS — M54.16 LUMBAR RADICULOPATHY, RIGHT: ICD-10-CM

## 2018-06-07 PROCEDURE — 99212 OFFICE O/P EST SF 10 MIN: CPT | Performed by: FAMILY MEDICINE

## 2018-06-07 RX ORDER — DIAZEPAM 10 MG
10 TABLET ORAL EVERY 6 HOURS PRN
Qty: 1 TABLET | Refills: 0 | Status: SHIPPED | OUTPATIENT
Start: 2018-06-07 | End: 2018-06-08

## 2018-06-07 RX ORDER — METHYLPREDNISOLONE ACETATE 40 MG/ML
40 INJECTION, SUSPENSION INTRA-ARTICULAR; INTRALESIONAL; INTRAMUSCULAR; SOFT TISSUE ONCE
Qty: 1 ML | Refills: 0 | Status: CANCELLED | OUTPATIENT
Start: 2018-06-07 | End: 2018-06-07

## 2018-06-07 NOTE — TELEPHONE ENCOUNTER
US guided right hip injection was unable to be performed today by Dr. Jiménez due to patient's anxiety. Per Dr. Jiménez an order was placed for right hip intra-articular steroid injection performed under fluoroscope was ordered. Patient will need Valium for exam.     Please sign order.    Keira Santiago, ATC

## 2018-06-07 NOTE — PATIENT INSTRUCTIONS
1. Primary osteoarthritis of right hip      Unable to do injection today  Recommend the injection be done under xray guidance  Also recommend a Valium prior to your injection  This was discussed with Dr. Garcia    Follow up for an xray guided injection.

## 2018-06-07 NOTE — PROGRESS NOTES
Right Hip Intra-Articular Corticosteroid Injection     Diagnoses (preoperative and postoperative):  Primary osteoarthritis of right hip    Current Procedure (include preoperative):  Sonographically guided right hip intra-articular corticosteroid injection  Current Indication (include preoperative):  Alleviation of pain  REFERRED BY:  Dr. Garcia  REASON FOR PROCEDURE: Dr. Garcia has requested a right hip joint corticosteroid injection for modulation of pain. Sonographic guidance will be used to ensure accurate placement of the medication within the joint space and avoid nearby neurovascular structures.  PATIENT EDUCATION:  Ready to learn with no apparent learning barriers identified.  Learning preferences include listening. Explained diagnosis and treatment plan as well as treatment alternatives. Patient expressed understanding of the content.  Following denial of allergy and review of potential side effects and complications including but not necessarily limited to infection, bleeding, allergic reaction, post-injection flare, local tissue breakdown (including but not limited to potential for skin depigmentation and/or subcutaneous fat atrophy), systemic effects of corticosteroids, elevation of blood glucose, injury to soft tissue and/or nerves and seizure, patient indicated their understanding and agreed to proceed. Written and signed consent was obtained and is scanned into the chart.  PROCEDURE: Prior to the procedure, the right anterior hip joint was examined with a 4 MHz curvilinear transducer to visualize the joint space and determine the approach for the procedure.  Patient is very anxious and unwilling to lie flat. Unable to visualize the femoral head/neck junction.  Even with internal / external rotation of the leg unable to visualize the hip joint and or capsule.  Given these circumstances I did not feel comfortable proceeding the injection.  Recommend pre-treating with Valium or performing the injection  under sedation and via fluoroscopy.  Case discussed with Dr. Garcia.   Lynnette Jiménez,  [facility]

## 2018-06-07 NOTE — LETTER
6/7/2018         RE: Cristy Bailey  [address]  [place] [address]        Dear Colleague,    Thank you for referring your patient, Cristy Bailey, to the [facility]. Please see a copy of my visit note below.    Right Hip Intra-Articular Corticosteroid Injection     Diagnoses (preoperative and postoperative):  Primary osteoarthritis of right hip    Current Procedure (include preoperative):  Sonographically guided right hip intra-articular corticosteroid injection  Current Indication (include preoperative):  Alleviation of pain  REFERRED BY:  Dr. Garcia  REASON FOR PROCEDURE: Dr. Garcia has requested a right hip joint corticosteroid injection for modulation of pain. Sonographic guidance will be used to ensure accurate placement of the medication within the joint space and avoid nearby neurovascular structures.  PATIENT EDUCATION:  Ready to learn with no apparent learning barriers identified.  Learning preferences include listening. Explained diagnosis and treatment plan as well as treatment alternatives. Patient expressed understanding of the content.  Following denial of allergy and review of potential side effects and complications including but not necessarily limited to infection, bleeding, allergic reaction, post-injection flare, local tissue breakdown (including but not limited to potential for skin depigmentation and/or subcutaneous fat atrophy), systemic effects of corticosteroids, elevation of blood glucose, injury to soft tissue and/or nerves and seizure, patient indicated their understanding and agreed to proceed. Written and signed consent was obtained and is scanned into the chart.  PROCEDURE: Prior to the procedure, the right anterior hip joint was examined with a 4 MHz curvilinear transducer to visualize the joint space and determine the approach for the procedure.  Patient is very anxious and unwilling to lie flat. Unable to visualize the femoral  head/neck junction.  Even with internal / external rotation of the leg unable to visualize the hip joint and or capsule.  Given these circumstances I did not feel comfortable proceeding the injection.  Recommend pre-treating with Valium or performing the injection under sedation and via fluoroscopy.  Case discussed with Dr. Garcia.   Lynnette Jiménez DO [facility]      Again, thank you for allowing me to participate in the care of your patient.        Sincerely,        Lynnette Jiménez DO

## 2018-06-07 NOTE — TELEPHONE ENCOUNTER
Patient calls stating she spoke with radiology and they asked her to call about possible new orders.   Informed of Dr. Coffman's recommendation for IV sedation. Patient is in agreement.  Informed she may need to get a pre-op exam with PCP but Radiology will let her know.   Patient very tearful and states she can't continue with getting in the car for these appointments as she is so painful.   Discussed we need to make sure it is safe for the sedation and to make sure we are able to do the procedure safely in order to help with her pain.   She verbalized understanding and will contact radiology.     DIEGO Javed RN

## 2018-06-07 NOTE — MR AVS SNAPSHOT
After Visit Summary   6/7/2018    Cirsty Bailey    MRN: [id number]           Patient Information     Date Of Birth          1945        Visit Information        Provider Department      6/7/2018 10:40 AM Lynnette Jiménez, DO [facility]        Today's Diagnoses     Primary osteoarthritis of right hip    -  1      Care Instructions    1. Primary osteoarthritis of right hip      Unable to do injection today  Recommend the injection be done under xray guidance  Also recommend a Valium prior to your injection  This was discussed with Dr. Garcia    Follow up for an xray guided injection.             Follow-ups after your visit        Your next 10 appointments already scheduled     Jun 21, 2018 11:30 AM CDT   (Arrive by 11:15 AM)   New Patient Visit with Johnnie Palafox MD   [facility] Neurology ([facility] [place])    [address] [place] [id number] [phone]              Who to contact     If you have questions or need follow up information about today's clinic visit or your schedule please contact [facility] directly at [phone].  Normal or non-critical lab and imaging results will be communicated to you by [employer], letter or phone within 4 business days after the clinic has received the results. If you do not hear from us within 7 days, please contact the clinic through [employer] or phone. If you have a critical or abnormal lab result, we will notify you by phone as soon as possible.  Submit refill requests through [employer] or call your pharmacy and they will forward the refill request to us. Please allow 3 business days for your refill to be completed.          Additional Information About Your Visit        [employer] Information     [employer] gives you secure access to your electronic health record. If you see a primary care provider, you can also send messages to your care team and make appointments.  If you have questions, please call your primary care clinic.  If you do not have a primary care provider, please call [phone] and they will assist you.        Care EveryWhere ID     This is your Care EveryWhere ID. This could be used by other organizations to access your [facility] medical records  [id number]         Blood Pressure from Last 3 Encounters:   05/24/18 132/72   05/24/18 132/70   05/10/18 140/80    Weight from Last 3 Encounters:   05/24/18 263 lb (119.3 kg)   05/10/18 263 lb (119.3 kg)   05/10/18 263 lb (119.3 kg)              Today, you had the following     No orders found for display       Primary Care Provider Office Phone # Fax #    Manoj Daley PA-C [phone]       [address]  [facility] [address]        Goals        General    Pain Management (pt-stated)     Notes - Note created  4/5/2018  2:53 PM by Francisca Hernandez LSW    Goal Statement: I will have less pain.  Measure of Success: Will have intervention that addresses my pain.   Supportive Steps to Achieve: CC will contact PCP to discuss patient concerns and next steps.   Barriers: Cannot leave the house without extreme effort. Is having extreme pain and does not have any sense that it will get better. Her  is her caregiver and he is frustrated with her pain.  Strengths: Has  who supports her.    Date to Achieve By: 9-1-2018        Equal Access to Services     MICHELLE WISE AH: Hadii braden peace hadasho Sosuhailali, waaxda luqadaha, qaybta kaalmada adeegyada, [place]. So [facility] [phone].    ATENCIÓN: Si habla español, tiene a pompa disposición servicios gratuitos de asistencia lingüística. Llame al [phone].    We comply with applicable federal civil rights laws and [place] laws. We do not discriminate on the basis of race, color, national origin, age, disability, sex, sexual orientation, or gender identity.            Thank you!     Thank you for choosing [facility]  for your care. Our goal is always to provide you with excellent care. Hearing back from our patients is one way we can continue to improve our services. Please take a few minutes to complete the written survey that you may receive in the mail after your visit with us. Thank you!             Your Updated Medication List - Protect others around you: Learn how to safely use, store and throw away your medicines at www.disposemymeds.org.          This list is accurate as of 6/7/18 11:20 AM.  Always use your most recent med list.                   Brand Name Dispense Instructions for use Diagnosis    diazepam 10 MG tablet    VALIUM    1 tablet    Take 1 tablet (10 mg) by mouth every 6 hours as needed for anxiety or sleep Take 30-60 minutes before procedure.  Do not operate a vehicle after taking this medication.    Lumbar radiculopathy, right       gabapentin 300 MG capsule    NEURONTIN    180 capsule    Take 1 capsule (300 mg) by mouth 2 times daily    Weakness of both lower extremities, Bilateral leg pain       HYDROcodone-acetaminophen 5-325 MG per tablet    NORCO    30 tablet    Take 1 tablet by mouth At Bedtime    Bilateral leg pain       lisinopril 5 MG tablet    PRINIVIL/ZESTRIL    90 tablet    Take 1 tablet (5 mg) by mouth daily    HTN, goal below 140/90       order for DME     2 Package    Equipment being ordered: Compression stockings 20-30mmHg    Lower extremity edema

## 2018-06-07 NOTE — TELEPHONE ENCOUNTER
Phone call to patient and informed of Dr. Garcia's recommendations.   Would like prescription called to [facility].   Patient states the last time she had an MRI, our rooming staff recommended to her that she take 2 Norco tablets and the valium 10mg prior to her MRI ordered by Dr. Garcia.   She has difficulty lying flat due to severe pain.  She wants to know if she should do that again this time. Has [place] prescribed by PCP.     Valium 10mg called to pharmacy.     Please discuss with Dr. Garcia and advise.     DIEGO Javed RN

## 2018-06-07 NOTE — TELEPHONE ENCOUNTER
Patient was called to be informed that a new order for a right hip intra-articular injection under fluoroscope was ordered today, in addition a Rx for Valium 10mg was written for patient to take prior to procedure.     Per Dr. Garcia it is very important she has injection completed to confirm that she would benefit from a JENAE.     No answer on phone, and no consent to communicate. Rx for Valium was brought to Triage.     Keira Santiago ATC

## 2018-06-07 NOTE — TELEPHONE ENCOUNTER
Per Dr. Garcia the procedure order has been updated to be with  IV sedation to be better tolerated by patient. Patient not to  or take Valium 10mg that was called into pharmacy.     Keira Santiago, ATC

## 2018-06-08 ENCOUNTER — OFFICE VISIT (OUTPATIENT)
Dept: FAMILY MEDICINE | Facility: CLINIC | Age: 73
End: 2018-06-08
Payer: COMMERCIAL

## 2018-06-08 ENCOUNTER — MYC MEDICAL ADVICE (OUTPATIENT)
Dept: FAMILY MEDICINE | Facility: CLINIC | Age: 73
End: 2018-06-08

## 2018-06-08 VITALS
OXYGEN SATURATION: 97 % | RESPIRATION RATE: 18 BRPM | DIASTOLIC BLOOD PRESSURE: 82 MMHG | TEMPERATURE: 98.5 F | HEIGHT: 66 IN | SYSTOLIC BLOOD PRESSURE: 144 MMHG | HEART RATE: 76 BPM

## 2018-06-08 DIAGNOSIS — R29.898 LEG WEAKNESS, BILATERAL: ICD-10-CM

## 2018-06-08 DIAGNOSIS — Z01.818 PREOP GENERAL PHYSICAL EXAM: Primary | ICD-10-CM

## 2018-06-08 DIAGNOSIS — I10 HTN, GOAL BELOW 140/90: ICD-10-CM

## 2018-06-08 DIAGNOSIS — M16.0 OSTEOARTHRITIS OF BOTH HIPS, UNSPECIFIED OSTEOARTHRITIS TYPE: ICD-10-CM

## 2018-06-08 PROCEDURE — 99214 OFFICE O/P EST MOD 30 MIN: CPT | Performed by: PHYSICIAN ASSISTANT

## 2018-06-08 NOTE — MR AVS SNAPSHOT
After Visit Summary   6/8/2018    Cristy Bailey    MRN: [id number]           Patient Information     Date Of Birth          1945        Visit Information        Provider Department      6/8/2018 2:40 PM Manoj Daley PA-C [facility]        Today's Diagnoses     Preop general physical exam    -  1    Leg weakness, bilateral        Osteoarthritis of both hips, unspecified osteoarthritis type          Care Instructions      Before Your Surgery      Call your surgeon if there is any change in your health. This includes signs of a cold or flu (such as a sore throat, runny nose, cough, rash or fever).    Do not smoke, drink alcohol or take over the counter medicine (unless your surgeon or primary care doctor tells you to) for the 24 hours before and after surgery.    If you take prescribed drugs: Follow your doctor s orders about which medicines to take and which to stop until after surgery.    Eating and drinking prior to surgery: follow the instructions from your surgeon    Take a shower or bath the night before surgery. Use the soap your surgeon gave you to gently clean your skin. If you do not have soap from your surgeon, use your regular soap. Do not shave or scrub the surgery site.  Wear clean pajamas and have clean sheets on your bed.           Follow-ups after your visit        Your next 10 appointments already scheduled     Jun 21, 2018 11:30 AM CDT   (Arrive by 11:15 AM)   New Patient Visit with Johnnie Palafox MD   [facility] ([facility])    [address] [place] [id number] [phone]              Future tests that were ordered for you today     Open Future Orders        Priority Expected Expires Ordered    XR Joint Injection Major Right Routine 6/7/2018 6/7/2019 6/7/2018            Who to contact     If you have questions or need follow up information about today's clinic visit or your schedule  "please contact [facility] directly at [phone].  Normal or non-critical lab and imaging results will be communicated to you by MyChart, letter or phone within 4 business days after the clinic has received the results. If you do not hear from us within 7 days, please contact the clinic through [employer] or phone. If you have a critical or abnormal lab result, we will notify you by phone as soon as possible.  Submit refill requests through [employer]Tap or call your pharmacy and they will forward the refill request to us. Please allow 3 business days for your refill to be completed.          Additional Information About Your Visit        [employer] Information     TipTap gives you secure access to your electronic health record. If you see a primary care provider, you can also send messages to your care team and make appointments. If you have questions, please call your primary care clinic.  If you do not have a primary care provider, please call [phone] and they will assist you.        Care EveryWhere ID     This is your Care EveryWhere ID. This could be used by other organizations to access your [place] medical records  [id number]        Your Vitals Were     Pulse Temperature Respirations Height Pulse Oximetry       76 98.5  F (36.9  C) (Tympanic) 18 5' 6\" (1.676 m) 97%        Blood Pressure from Last 3 Encounters:   06/08/18 144/82   05/24/18 132/72   05/24/18 132/70    Weight from Last 3 Encounters:   05/24/18 263 lb (119.3 kg)   05/10/18 263 lb (119.3 kg)   05/10/18 263 lb (119.3 kg)              Today, you had the following     No orders found for display       Primary Care Provider Office Phone # Fax #    Manoj Daley PA-C [phone]       [address]  [facility] [address]        Goals        General    Pain Management (pt-stated)     Notes - Note created  4/5/2018  2:53 PM by Francisca Hernandez LSW    Goal Statement: I will have less pain.  Measure of Success: Will " have intervention that addresses my pain.   Supportive Steps to Achieve: CC will contact PCP to discuss patient concerns and next steps.   Barriers: Cannot leave the house without extreme effort. Is having extreme pain and does not have any sense that it will get better. Her  is her caregiver and he is frustrated with her pain.  Strengths: Has  who supports her.    Date to Achieve By: 9-1-2018        Equal Access to Services     MICHELLE [facility] Hospital[facility]IP[facility]O : Hadii braden peace hadasho Sosuhailali, waaxda luqadaha, qaybta kaalmada adeyamiletyada, larry garciamarilynbenjamin davalos . So [facility] [phone].    ATENCIÓN: Si habla español, tiene a pompa disposición servicios gratuitos de asistencia lingüística. Llame al [phone].    We comply with applicable federal civil rights laws and [place] laws. We do not discriminate on the basis of race, color, national origin, age, disability, sex, sexual orientation, or gender identity.            Thank you!     Thank you for choosing [facility]  for your care. Our goal is always to provide you with excellent care. Hearing back from our patients is one way we can continue to improve our services. Please take a few minutes to complete the written survey that you may receive in the mail after your visit with us. Thank you!             Your Updated Medication List - Protect others around you: Learn how to safely use, store and throw away your medicines at www.disposemymeds.org.          This list is accurate as of 6/8/18  3:38 PM.  Always use your most recent med list.                   Brand Name Dispense Instructions for use Diagnosis    gabapentin 300 MG capsule    NEURONTIN    180 capsule    Take 1 capsule (300 mg) by mouth 2 times daily    Weakness of both lower extremities, Bilateral leg pain       HYDROcodone-acetaminophen 5-325 MG per tablet    NORCO    30 tablet    Take 1 tablet by mouth At Bedtime    Bilateral leg pain       lisinopril 5 MG tablet     PRINIVIL/ZESTRIL    90 tablet    Take 1 tablet (5 mg) by mouth daily    HTN, goal below 140/90       order for DME     2 Package    Equipment being ordered: Compression stockings 20-30mmHg    Lower extremity edema

## 2018-06-08 NOTE — PROGRESS NOTES
[facility]  [address] [id number] [phone]  Dept: [phone]    PRE-OP EVALUATION:  Today's date: 2018    Cristy Bailey (: 1945) presents for pre-operative evaluation assessment as requested by provider.  She requires evaluation and anesthesia risk assessment prior to undergoing surgery/procedure for IV sedation for right Hip Injection.    Fax number for surgical facility: [phone]  Primary Physician: Manoj Daley  Type of Anesthesia Anticipated: General    Patient has a Health Care Directive or Living Will:  NO    Preop Questions 2018   Who is doing your surgery? dont know   What are you having done? inection   Date of Surgery/Procedure: dont know after this appointment with set up   Facility or Hospital where procedure/surgery will be performed: [facility]   1.  Do you have a history of Heart attack, stroke, stent, coronary bypass surgery, or other heart surgery? No   2.  Do you ever have any pain or discomfort in your chest? No   3.  Do you have a history of  Heart Failure? No   4.   Are you troubled by shortness of breath when:  walking on a level surface, or up a slight hill, or at night? No   5.  Do you currently have a cold, bronchitis or other respiratory infection? No   6.  Do you have a cough, shortness of breath, or wheezing? No   7.  Do you sometimes get pains in the calves of your legs when you walk? UNKNOWN - limited time on feet, wheel chair required for mobility since 2017   8. Do you or anyone in your family have previous history of blood clots? No   9.  Do you or does anyone in your family have a serious bleeding problem such as prolonged bleeding following surgeries or cuts? No   10. Have you ever had problems with anemia or been told to take iron pills? No   11. Have you had any abnormal blood loss such as black, tarry or bloody stools, or abnormal vaginal bleeding? No   12. Have you  ever had a blood transfusion? No   13. Have you or any of your relatives ever had problems with anesthesia? No   14. Do you have sleep apnea, excessive snoring or daytime drowsiness? No   15. Do you have any prosthetic heart valves? No   16. Do you have prosthetic joints? No   17. Is there any chance that you may be pregnant? No         HPI:     HPI related to upcoming procedure: Patient will be undergoing a right hip injection but there was considerable anxiety around this and Cristy has increased difficulty laying flat given her pain. She will need IV sedation to have this procedure performed    HYPERTENSION - Patient has longstanding history of HTN , currently denies any symptoms referable to elevated blood pressure. Specifically denies chest pain, palpitations, dyspnea, orthopnea, PND or peripheral edema. Blood pressure readings have been in normal/high normal range. Current medication regimen is as listed below. Patient denies any side effects of medication.                                                                                                                                                                                          .    MEDICAL HISTORY:     Patient Active Problem List    Diagnosis Date Noted     Morbid obesity (H) 09/22/2017     Priority: Medium     Constipation 03/25/2015     Priority: Medium     Problem list name updated by automated process. Provider to review       Myalgia and myositis 03/25/2015     Priority: Medium     Problem list name updated by automated process. Provider to review       Leg weakness 03/24/2015     Priority: Medium     Constipation 04/01/2014     Priority: Medium     HTN, goal below 140/90 07/30/2013     Priority: Medium     Health Care Home 05/18/2012     Priority: Medium     FPA [employer] for .  Amy Wray RN-BSN, [facility]  [phone]   DX V65.8 REPLACED WITH [id number]ALTH CARE HOME (04/08/2013)       CRP elevated 05/17/2012      Priority: Medium     Vitamin D deficiency 05/17/2012     Priority: Medium     Leg weakness, bilateral 05/13/2012     Priority: Medium     Hyperlipidemia LDL goal <160 08/02/2011     Priority: Medium     Abnormal glucose 08/02/2011     Priority: Medium     IMO update changed this record. Please review for accuracy       Obesity 08/02/2011     Priority: Medium     Advanced directives, counseling/discussion 08/02/2011     Priority: Medium     Advance Directive Problem List Overview:   Name Relationship Phone    Primary Health Care Agent            Alternative Health Care Agent          Discussed advance care planning with patient; information given to patient to review. 8/2/2011          HTN (hypertension) 01/19/2010     Priority: Medium      Past Medical History:   Diagnosis Date     HTN (hypertension) 1/2010      Leg weakness 3/24/2015     Lyme disease 1980'S AND 2004    lYME DZ LIKE SXS RESPONDED TO ABX     Past Surgical History:   Procedure Laterality Date     BIOPSY OF UTERUS LINING  3/2010    negative.      C C-SEC ONLY,PREV C-SEC      c-sec x 3      COLONOSCOPY  2/21/10    benign findings. advised 10 yr f/u.      Current Outpatient Prescriptions   Medication Sig Dispense Refill     gabapentin (NEURONTIN) 300 MG capsule Take 1 capsule (300 mg) by mouth 2 times daily 180 capsule 0     HYDROcodone-acetaminophen (NORCO) 5-325 MG per tablet Take 1 tablet by mouth At Bedtime 30 tablet 0     lisinopril (PRINIVIL/ZESTRIL) 5 MG tablet Take 1 tablet (5 mg) by mouth daily 90 tablet 1     order for DME Equipment being ordered: Compression stockings 20-30mmHg 2 Package 0     OTC products: None, except as noted above    Allergies   Allergen Reactions     No Known Drug Allergies       Latex Allergy: NO    Social History   Substance Use Topics     Smoking status: Former Smoker     Quit date: 8/2/1984     Smokeless tobacco: Never Used     Alcohol use No     History   Drug Use No       REVIEW OF SYSTEMS:   CONSTITUTIONAL:  "NEGATIVE for fever, chills, change in weight  ENT/MOUTH: NEGATIVE for ear, mouth and throat problems  RESP: NEGATIVE for significant cough or SOB  CV: NEGATIVE for chest pain, palpitations or peripheral edema  MUSCULOSKELETAL: POSITIVE  for hip, knee pain; LE weakness bilaterally  PSYCHIATRIC: POSITIVE for stress regarding unresolved symptoms.     EXAM:   /82 (BP Location: Right arm, Patient Position: Chair, Cuff Size: Adult Large)  Pulse 76  Temp 98.5  F (36.9  C) (Tympanic)  Resp 18  Ht 5' 6\" (1.676 m)  SpO2 97%  GENERAL APPEARANCE: alert, no distress and cooperative  HENT: ear canals and TM's normal and nose and mouth without ulcers or lesions  RESP: lungs clear to auscultation - no rales, rhonchi or wheezes  CV: regular rate and rhythm, normal S1 S2, no S3 or S4 and no murmur, click or rub   ABDOMEN: soft, nontender  MS: limited exam performed in wheelchair; distal pulses intact  NEURO: mentation intact    DIAGNOSTICS:   No labs or EKG required for low risk surgery    Recent Labs   Lab Test  04/30/18   1246  02/08/18   1907   03/24/15   0827   05/03/12   0837   HGB  14.3  14.0   --    --    < >   --    PLT  283  280   --    --    < >   --    NA  142  141   < >  142   < >  139   POTASSIUM  3.8  4.3   < >  4.8   < >  4.4   CR  0.58  0.64   < >  0.67   < >  0.64   A1C   --    --    --   5.9   --   5.8    < > = values in this interval not displayed.        IMPRESSION:   Reason for surgery/procedure: guided right hip intra-articular corticosteroid injection, 2/2 osteoarthritis of right hip  Diagnosis/reason for consult: Pre-operative exam    The proposed surgical procedure is considered LOW risk.    REVISED CARDIAC RISK INDEX  The patient has the following serious cardiovascular risks for perioperative complications such as (MI, PE, VFib and 3  AV Block):  No serious cardiac risks  INTERPRETATION: 0 risks: Class I (very low risk - 0.4% complication rate)    The patient has the following additional risks " for perioperative complications:  No identified additional risks      ICD-10-CM    1. Preop general physical exam Z01.818    2. Leg weakness, bilateral R29.898    3. Osteoarthritis of both hips, unspecified osteoarthritis type M16.0        RECOMMENDATIONS:     APPROVAL GIVEN to proceed with proposed procedure, without further diagnostic evaluation       Signed Electronically by: Manoj Daley PA-C    Copy of this evaluation report is provided to requesting physician.    [place] Preop Guidelines    Revised Cardiac Risk Index

## 2018-06-08 NOTE — PROGRESS NOTES
"  SUBJECTIVE:   Cristy Bailey is a 73 year old female who presents to clinic today for the following health issues:    {Superlists:101189}    {additional problems for provider to add:702634}    Problem list and histories reviewed & adjusted, as indicated.  Additional history: {NONE - AS DOCUMENTED:040801::\"as documented\"}    {HIST REVIEW/ LINKS 2:980928}    Reviewed and updated as needed this visit by clinical staff       Reviewed and updated as needed this visit by Provider         {PROVIDER CHARTING PREFERENCE:512273}  "

## 2018-06-10 ASSESSMENT — ENCOUNTER SYMPTOMS
DIZZINESS: 0
TINGLING: 1
DISTURBANCES IN COORDINATION: 0
SEIZURES: 0
MYALGIAS: 1
MEMORY LOSS: 0
HEADACHES: 0
WEAKNESS: 0
ARTHRALGIAS: 1
SPEECH CHANGE: 0
MUSCLE CRAMPS: 1
MUSCLE WEAKNESS: 1
TREMORS: 0
NECK PAIN: 0
BACK PAIN: 1
JOINT SWELLING: 0
LOSS OF CONSCIOUSNESS: 0
PARALYSIS: 0
NUMBNESS: 1
STIFFNESS: 1

## 2018-06-11 ENCOUNTER — PATIENT OUTREACH (OUTPATIENT)
Dept: CARE COORDINATION | Facility: CLINIC | Age: 73
End: 2018-06-11

## 2018-06-11 NOTE — PROGRESS NOTES
Clinic Care Coordination Contact  Care Team Conversations    Call from [facility]  who talked to patient earlier today. Patient started to cry on the call. Lillie got permission to talk to CC.      Reviewed patient's background and needs.  Lillie will look at getting a rental wheelchair that fit her.      Lillie will contact patient and will be setting up home visit.      Plan- assist patient to get needs met.  Call in two weeks.  Francisca Hernandez,   [facility]  [email or web address]  [phone]

## 2018-06-12 NOTE — TELEPHONE ENCOUNTER
Called patient and informed that form is at  to be picked up. Patient was also informed that she needs to fill out the top part of the form.   Kiera Appiah MA

## 2018-06-15 NOTE — TELEPHONE ENCOUNTER
FUTURE VISIT INFORMATION      FUTURE VISIT INFORMATION:    Date: 0621/2018    Time:     Location:   REFERRAL INFORMATION:    Referring provider:  GERMANIA LOPEZ    Referring providers clinic:      Reason for visit/diagnosis          RECORDS STATUS      Internal Records: [facility], Care Everywhere and PACS.

## 2018-06-19 ENCOUNTER — PATIENT OUTREACH (OUTPATIENT)
Dept: CARE COORDINATION | Facility: CLINIC | Age: 73
End: 2018-06-19

## 2018-06-19 ASSESSMENT — ACTIVITIES OF DAILY LIVING (ADL): DEPENDENT_IADLS:: COOKING;CLEANING;LAUNDRY;SHOPPING;MEAL PREPARATION;TRANSPORTATION

## 2018-06-19 NOTE — PROGRESS NOTES
Clinic Care Coordination Contact    Clinic Care Coordination Contact  OUTREACH    Referral Information:  Referral Source: Home Care    Primary Diagnosis: Chronic Pain    Chief Complaint   Patient presents with     Clinic Care Coordination - Follow-up        Universal Utilization: appropriate utilization. Has injection scheduled for tomorrow for her hip and neurology in two days.   Clinic Utilization  Difficulty keeping appointments:: No  Utilization    Last refreshed: 6/18/2018  3:45 PM:  No Show Count (past year) 1       Last refreshed: 6/18/2018  3:45 PM:  ED visits 2       Last refreshed: 6/18/2018  3:45 PM:  Hospital admissions 0          Current as of: 6/18/2018  3:45 PM             Clinical Concerns:  Current Medical Concerns:  No new concerns identified. Reviewed the purpose of the injection.  She could not get the injection at the office visit as she could not lay flat. She is having it in the hospital with some sedation.      Current Behavioral Concerns: Frustrated with how long it is taking to get the wheelchair. Reviewed that it could be six months from April before she gets the WC.      Education Provided to patient: Reviewed that she could get a rental wheelchair from her AC waiver and she should call  to discuss. Gave her the phone number of Lillie.    Pain  Chronic pain (GOAL):: Yes  Location of chronic pain:: legs , hips and lower extremities. Gets worse as it goes down   Chronic pain timing:: Constant  Limitation of routine activities due to chronic pain:: Yes  Health Maintenance Reviewed: Due/Overdue   Health Maintenance Due   Topic Date Due     HEPATITIS C SCREENING  05/30/1965     PNEUMOCOCCAL (1 of 2 - PCV13) 05/30/2012     MAMMO SCREEN Q2 YR (SYSTEM ASSIGNED)  08/01/2013     ADVANCE DIRECTIVE PLANNING Q5 YRS  08/02/2016     Clinical Pathway: None    Medication Management:  Independent, no concerns.      Functional Status:  Dependent ADLs:: Wheelchair-independent  Dependent IADLs:: Cooking,  Cleaning, Laundry, Shopping, Meal Preparation, Transportation  Bed or wheelchair confined:: No  Mobility Status: Independent w/Device  Fallen 2 or more times in the past year?: No  Any fall with injury in the past year?: No    Living Situation:  Current living arrangement:: I live in a private home with spouse  Type of residence:: Private home - no stairs    Diet/Exercise/Sleep:  Diet:: Regular  Inadequate nutrition (GOAL):: No  Exercise:: Unable to exercise  Inadequate activity/exercise (GOAL):: No  Significant changes in sleep pattern (GOAL): No    Transportation:  Transportation concerns (GOAL):: No  Transportation means:: Regular car     Psychosocial:  Denominational or spiritual beliefs that impact treatment:: No  Mental health DX:: Yes  Mental health DX how managed:: Medication  Mental health management concern (GOAL):: Yes  Informal Support system:: Family, Spouse     Financial/Insurance:   Financial/Insurance concerns (GOAL):: No  [employer] for Seniors.      Resources and Interventions:  Current Resources:      Community Resources: [facility] Programs, [facility] Worker     Equipment Currently Used at Home: walker, standard, wheelchair, manual    Advance Care Plan/Directive  Advanced Care Plans/Directives on file:: No          Goals:   Goals        General    Pain Management (pt-stated)     Notes - Note created  4/5/2018  2:53 PM by Francisca Hernandez LSW    Goal Statement: I will have less pain.  Measure of Success: Will have intervention that addresses my pain.   Supportive Steps to Achieve: CC will contact PCP to discuss patient concerns and next steps.   Barriers: Cannot leave the house without extreme effort. Is having extreme pain and does not have any sense that it will get better. Her  is her caregiver and he is frustrated with her pain.  Strengths: Has  who supports her.    Date to Achieve By: 9-1-2018              Patient/Caregiver understanding: She will call ROBERT Moreira to ask to get a rental wheelchair.  She will get injection tomorrow and see neurology on June 21. CC to call next week to discuss these appointments.        Future Appointments              Tomorrow [facility] IMAGING NURSE; [facility] RAD; [id number] Radiology, [place] RID    In 2 days Johnnie Palafox MD [facility] Neurology, [facility]          Plan: CC to call in one week.      Francisca Hernandez,   [facility]  [email or web address]  [phone]

## 2018-06-20 ENCOUNTER — MYC MEDICAL ADVICE (OUTPATIENT)
Dept: ORTHOPEDICS | Facility: CLINIC | Age: 73
End: 2018-06-20

## 2018-06-20 ENCOUNTER — HOSPITAL ENCOUNTER (OUTPATIENT)
Dept: GENERAL RADIOLOGY | Facility: CLINIC | Age: 73
Discharge: HOME | End: 2018-06-20
Attending: ORTHOPAEDIC SURGERY | Admitting: ORTHOPAEDIC SURGERY
Payer: COMMERCIAL

## 2018-06-20 ENCOUNTER — MYC MEDICAL ADVICE (OUTPATIENT)
Dept: FAMILY MEDICINE | Facility: CLINIC | Age: 73
End: 2018-06-20

## 2018-06-20 VITALS — DIASTOLIC BLOOD PRESSURE: 87 MMHG | HEART RATE: 74 BPM | OXYGEN SATURATION: 99 % | SYSTOLIC BLOOD PRESSURE: 141 MMHG

## 2018-06-20 DIAGNOSIS — M16.11 PRIMARY OSTEOARTHRITIS OF RIGHT HIP: ICD-10-CM

## 2018-06-20 PROCEDURE — 25000125 ZZHC RX 250

## 2018-06-20 PROCEDURE — 25500064 ZZH RX 255 OP 636: Performed by: PHYSICIAN ASSISTANT

## 2018-06-20 PROCEDURE — 25000128 H RX IP 250 OP 636

## 2018-06-20 PROCEDURE — 77002 NEEDLE LOCALIZATION BY XRAY: CPT

## 2018-06-20 RX ORDER — BUPIVACAINE HYDROCHLORIDE 5 MG/ML
INJECTION, SOLUTION EPIDURAL; INTRACAUDAL
Status: COMPLETED
Start: 2018-06-20 | End: 2018-06-20

## 2018-06-20 RX ORDER — IOPAMIDOL 408 MG/ML
10 INJECTION, SOLUTION INTRATHECAL ONCE
Status: COMPLETED | OUTPATIENT
Start: 2018-06-20 | End: 2018-06-20

## 2018-06-20 RX ORDER — LIDOCAINE HYDROCHLORIDE 10 MG/ML
5 INJECTION, SOLUTION EPIDURAL; INFILTRATION; INTRACAUDAL; PERINEURAL ONCE
Status: COMPLETED | OUTPATIENT
Start: 2018-06-20 | End: 2018-06-20

## 2018-06-20 RX ORDER — LIDOCAINE HYDROCHLORIDE 10 MG/ML
INJECTION, SOLUTION EPIDURAL; INFILTRATION; INTRACAUDAL; PERINEURAL
Status: DISCONTINUED
Start: 2018-06-20 | End: 2018-06-20 | Stop reason: WASHOUT

## 2018-06-20 RX ORDER — LIDOCAINE HYDROCHLORIDE 10 MG/ML
INJECTION, SOLUTION EPIDURAL; INFILTRATION; INTRACAUDAL; PERINEURAL
Status: COMPLETED
Start: 2018-06-20 | End: 2018-06-20

## 2018-06-20 RX ORDER — BUPIVACAINE HYDROCHLORIDE 5 MG/ML
4 INJECTION, SOLUTION EPIDURAL; INTRACAUDAL ONCE
Status: COMPLETED | OUTPATIENT
Start: 2018-06-20 | End: 2018-06-20

## 2018-06-20 RX ORDER — FENTANYL CITRATE 50 UG/ML
INJECTION, SOLUTION INTRAMUSCULAR; INTRAVENOUS
Status: DISCONTINUED
Start: 2018-06-20 | End: 2018-06-20 | Stop reason: WASHOUT

## 2018-06-20 RX ORDER — TRIAMCINOLONE ACETONIDE 40 MG/ML
INJECTION, SUSPENSION INTRA-ARTICULAR; INTRAMUSCULAR
Status: COMPLETED
Start: 2018-06-20 | End: 2018-06-20

## 2018-06-20 RX ORDER — TRIAMCINOLONE ACETONIDE 40 MG/ML
40 INJECTION, SUSPENSION INTRA-ARTICULAR; INTRAMUSCULAR ONCE
Status: COMPLETED | OUTPATIENT
Start: 2018-06-20 | End: 2018-06-20

## 2018-06-20 RX ADMIN — LIDOCAINE HYDROCHLORIDE 5 ML: 10 INJECTION, SOLUTION EPIDURAL; INFILTRATION; INTRACAUDAL; PERINEURAL at 09:44

## 2018-06-20 RX ADMIN — TRIAMCINOLONE ACETONIDE 40 MG: 40 INJECTION, SUSPENSION INTRA-ARTICULAR; INTRAMUSCULAR at 09:45

## 2018-06-20 RX ADMIN — BUPIVACAINE HYDROCHLORIDE 4 MG: 5 INJECTION, SOLUTION EPIDURAL; INTRACAUDAL at 09:45

## 2018-06-20 RX ADMIN — IOPAMIDOL 2 ML: 408 INJECTION, SOLUTION INTRATHECAL at 09:43

## 2018-06-20 RX ADMIN — BUPIVACAINE HYDROCHLORIDE 4 MG: 5 INJECTION, SOLUTION EPIDURAL; INTRACAUDAL; PERINEURAL at 09:45

## 2018-06-20 NOTE — PROGRESS NOTES
Pt was in Radiology today for a right hip steroid injection, she is to have conscious for positioning due to severe pain. We were able to get her comfortable on our table without using conscious sedation. .Pt tolerated ortho procedure well. Pre procedure pain was 5-6 post procedure pain level 3 .Procedure was completed by [facility]. There were no complications during this procedure. Pt verbalized understanding of written and verbal instructions and left department in stable and satisfactory condition with . There is no evidence of bleeding or any other complications upon discharge.

## 2018-06-20 NOTE — PROGRESS NOTES
RADIOLOGY PROCEDURE NOTE  Patient name: Cristy Bailey  MRN: [id number]  : 1945    Pre-procedure diagnosis: Back and right hip pain  Post-procedure diagnosis: Same    Procedure Date/Time: 2018  9:33 AM  Procedure: Right hip steroid injection  Estimated blood loss: None  Specimen(s) collected with description: none  The patient tolerated the procedure well with no immediate complications.  Significant findings:none    See imaging dictation for procedural details.    Provider name: Agustin Ceja  Assistant(s):None

## 2018-06-21 ENCOUNTER — PRE VISIT (OUTPATIENT)
Dept: NEUROLOGY | Facility: CLINIC | Age: 73
End: 2018-06-21

## 2018-06-21 ENCOUNTER — OFFICE VISIT (OUTPATIENT)
Dept: NEUROLOGY | Facility: CLINIC | Age: 73
End: 2018-06-21
Payer: COMMERCIAL

## 2018-06-21 VITALS — DIASTOLIC BLOOD PRESSURE: 86 MMHG | SYSTOLIC BLOOD PRESSURE: 132 MMHG | HEART RATE: 77 BPM | HEIGHT: 66 IN

## 2018-06-21 DIAGNOSIS — G56.00 CARPAL TUNNEL SYNDROME, UNSPECIFIED LATERALITY: ICD-10-CM

## 2018-06-21 DIAGNOSIS — M16.10 HIP ARTHRITIS: ICD-10-CM

## 2018-06-21 DIAGNOSIS — R29.898 LEG WEAKNESS, BILATERAL: Primary | ICD-10-CM

## 2018-06-21 RX ORDER — DIAZEPAM 10 MG
TABLET ORAL
Qty: 1 TABLET | Refills: 0 | Status: SHIPPED | OUTPATIENT
Start: 2018-06-21 | End: 2018-07-27

## 2018-06-21 ASSESSMENT — PAIN SCALES - GENERAL: PAINLEVEL: MODERATE PAIN (5)

## 2018-06-21 NOTE — MR AVS SNAPSHOT
After Visit Summary   6/21/2018    Cristy Bailey    MRN: [id number]           Patient Information     Date Of Birth          1945        Visit Information        Provider Department      6/21/2018 11:30 AM Johnnie Palafox MD [facility] Neurology        Today's Diagnoses     Leg weakness, bilateral    -  1    Hip arthritis        Carpal tunnel syndrome, unspecified laterality           Follow-ups after your visit        Additional Services     ORTHOPEDICS ADULT REFERRAL       Your provider has referred you to: [facility]: Orthopaedic Clinic [facility] [phone]   [email or web address]  [facility]    Please be aware that coverage of these services is subject to the terms and limitations of your health insurance plan.  Call member services at your health plan with any benefit or coverage questions.      Please bring the following to your appointment:    >>   Any x-rays, CTs or MRIs which have been performed.  Contact the facility where they were done to arrange for  prior to your scheduled appointment.    >>   List of current medications   >>   This referral request   >>   Any documents/labs given to you for this referral                  Follow-up notes from your care team     Return in about 3 weeks (around 7/13/2018).      Your next 10 appointments already scheduled     Jun 28, 2018  9:00 AM CDT   (Arrive by 8:45 AM)   EMG with Navneet Pina MD   [facility] EMG ([facility])    [address] [place] [address]   [phone]            Jun 28, 2018  9:30 AM CDT   (Arrive by 9:15 AM)   EMG with Navneet Pina MD   [facility] ([facility])    [address] [place] [address]   [phone]            Jun 29, 2018 10:40 AM CDT   (Arrive by 10:25 AM)   NEW HAND with Bry Ball MD   [facility] ([facility]  [place])    [address]  4th [address]  [facility] [address]   [phone]            Jul 01, 2018 10:45 AM CDT   MR THORACIC SPINE W/O & W CONTRAST with [id number]   [facility] ([facility])    [address]  [address] [facility] [address]   [phone]           Take your medicines as usual, unless your doctor tells you not to. Bring a list of your current medicines to your exam (including vitamins, minerals and over-the-counter drugs).  You may or may not receive intravenous (IV) contrast for this exam pending the discretion of the Radiologist.  You do not need to do anything special to prepare.  The MRI machine uses a strong magnet. Please wear clothes without metal (snaps, zippers). A sweatsuit works well, or we may give you a hospital gown.  Please remove any body piercings and hair extensions before you arrive. You will also remove watches, jewelry, hairpins, wallets, dentures, partial dental plates and hearing aids. You may wear contact lenses, and you may be able to wear your rings. We have a safe place to keep your personal items, but it is safer to leave them at home.  **IMPORTANT** THE INSTRUCTIONS BELOW ARE ONLY FOR THOSE PATIENTS WHO HAVE BEEN PRESCRIBED SEDATION OR GENERAL ANESTHESIA DURING THEIR MRI PROCEDURE:  IF YOUR DOCTOR PRESCRIBED ORAL SEDATION (take medicine to help you relax during your exam):   You must get the medicine from your doctor (oral medication) before you arrive. Bring the medicine to the exam. Do not take it at home. You ll be told when to take it upon arriving for your exam.   Arrive one hour early. Bring someone who can take you home after the test. Your medicine will make you sleepy. After the exam, you may not drive, take a bus or take a taxi by yourself.  IF YOUR DOCTOR PRESCRIBED IV SEDATION:   Arrive one hour early. Bring someone who can take you home after the test. Your medicine will make you sleepy. After the  exam, you may not drive, take a bus or take a taxi by yourself.   No eating 6 hours before your exam. You may have clear liquids up until 4 hours before your exam. (Clear liquids include water, clear tea, black coffee and fruit juice without pulp.)  IF YOUR DOCTOR PRESCRIBED ANESTHESIA (be asleep for your exam):   Arrive 1 1/2 hours early. Bring someone who can take you home after the test. You may not drive, take a bus or take a taxi by yourself.   No eating 8 hours before your exam. You may have clear liquids up until 4 hours before your exam. (Clear liquids include water, clear tea, black coffee and fruit juice without pulp.)   You will spend four to five hours in the recovery room.  Please call the Imaging Department at your exam site with any questions.            Jul 01, 2018 11:30 AM CDT   MR HIP BILATERAL with [id number]   [facility] ([facility] [place])    [address] [address] [phone]           Take your medicines as usual, unless your doctor tells you not to. Bring a list of your current medicines to your exam (including vitamins, minerals and over-the-counter drugs). Also bring the results of similar scans you may have had.  Please remove any body piercings and hair extensions before you arrive.  Follow your doctor s orders. If you do not, we may have to postpone your exam.  You may or may not receive IV contrast for this exam pending the discretion of the Radiologist.  You do not need to do anything special to prepare.  The MRI machine uses a strong magnet. Please wear clothes without metal (snaps, zippers). A sweatsuit works well, or we may give you a hospital gown.   **IMPORTANT** THE INSTRUCTIONS BELOW ARE ONLY FOR THOSE PATIENTS WHO HAVE BEEN PRESCRIBED SEDATION OR GENERAL ANESTHESIA DURING THEIR MRI PROCEDURE:  IF YOUR DOCTOR PRESCRIBED ORAL SEDATION (take medicine to help you relax during your exam):   You must get the  medicine from your doctor (oral medication) before you arrive. Bring the medicine to the exam. Do not take it at home. You ll be told when to take it upon arriving for your exam.   Arrive one hour early. Bring someone who can take you home after the test. Your medicine will make you sleepy. After the exam, you may not drive, take a bus or take a taxi by yourself.  IF YOUR DOCTOR PRESCRIBED IV SEDATION:   Arrive one hour early. Bring someone who can take you home after the test. Your medicine will make you sleepy. After the exam, you may not drive, take a bus or take a taxi by yourself.   No eating 6 hours before your exam. You may have clear liquids up until 4 hours before your exam. (Clear liquids include water, clear tea, black coffee and fruit juice without pulp.)  IF YOUR DOCTOR PRESCRIBED ANESTHESIA (be asleep for your exam):   Arrive 1 1/2 hours early. Bring someone who can take you home after the test. You may not drive, take a bus or take a taxi by yourself.   No eating 8 hours before your exam. You may have clear liquids up until 4 hours before your exam. (Clear liquids include water, clear tea, black coffee and fruit juice without pulp.)   You will spend four to five hours in the recovery room.  Please call the Imaging Department at your exam site with any questions.            Jul 20, 2018 10:00 AM CDT   (Arrive by 9:45 AM)   Return Visit with Johnnie Palafox MD   [facility] Neurology ([facility])    [address] [place] [address] [phone]              Future tests that were ordered for you today     Open Future Orders        Priority Expected Expires Ordered    EMG Routine 6/21/2018 6/21/2019 6/21/2018    EMG Routine  6/21/2019 6/21/2018    MR Hip Bilateral Routine 6/21/2018 6/21/2019 6/21/2018    MRI Thoracic spine w & w/o contrast Routine 6/21/2018 6/21/2019 6/21/2018            Who to contact     Please call your clinic at [phone]  "to:    Ask questions about your health    Make or cancel appointments    Discuss your medicines    Learn about your test results    Speak to your doctor            Additional Information About Your Visit        [employer] Information     [employer] gives you secure access to your electronic health record. If you see a primary care provider, you can also send messages to your care team and make appointments. If you have questions, please call your primary care clinic.  If you do not have a primary care provider, please call [phone] and they will assist you.      [employer] is an electronic gateway that provides easy, online access to your medical records. With [employer], you can request a clinic appointment, read your test results, renew a prescription or communicate with your care team.     To access your existing account, please contact your [facility] or call [phone] for assistance.        Care EveryWhere ID     This is your Care EveryWhere ID. This could be used by other organizations to access your [place] medical records  [id number]        Your Vitals Were     Pulse Height                77 1.676 m (5' 6\")           Blood Pressure from Last 3 Encounters:   06/21/18 132/86   06/20/18 141/87   06/08/18 144/82    Weight from Last 3 Encounters:   05/24/18 119.3 kg (263 lb)   05/10/18 119.3 kg (263 lb)   05/10/18 119.3 kg (263 lb)              We Performed the Following     ORTHOPEDICS ADULT REFERRAL          Today's Medication Changes          These changes are accurate as of 6/21/18  1:27 PM.  If you have any questions, ask your nurse or doctor.               Start taking these medicines.        Dose/Directions    diazepam 10 MG tablet   Commonly known as:  VALIUM   Used for:  Leg weakness, bilateral   Started by:  Johnnie Palafox MD        Take 30 min before mri   Quantity:  1 tablet   Refills:  0            Where to get your medicines      Some of these will need a paper " prescription and others can be bought over the counter.  Ask your nurse if you have questions.     Bring a paper prescription for each of these medications     diazepam 10 MG tablet                Primary Care Provider Office Phone # Fax #    Manoj Daley PA-C [phone]       [id number] [address] [facility] [address]        Goals        General    Pain Management (pt-stated)     Notes - Note created  4/5/2018  2:53 PM by Francisca Hernandez LSW    Goal Statement: I will have less pain.  Measure of Success: Will have intervention that addresses my pain.   Supportive Steps to Achieve: CC will contact PCP to discuss patient concerns and next steps.   Barriers: Cannot leave the house without extreme effort. Is having extreme pain and does not have any sense that it will get better. Her  is her caregiver and he is frustrated with her pain.  Strengths: Has  who supports her.    Date to Achieve By: 9-1-2018        Equal Access to Services     [facility] : Hadbrooke Martin, waaxda lugeorgia, qaybta kaalmacarrie mendez, larry davalos . So [facility] [phone].    ATENCIÓN: Si habla español, tiene a pompa disposición servicios gratuitos de asistencia lingüística. Llame al [phone].    We comply with applicable federal civil rights laws and [place] laws. We do not discriminate on the basis of race, color, national origin, age, disability, sex, sexual orientation, or gender identity.            Thank you!     Thank you for choosing [facility]UROLOGY  for your care. Our goal is always to provide you with excellent care. Hearing back from our patients is one way we can continue to improve our services. Please take a few minutes to complete the written survey that you may receive in the mail after your visit with us. Thank you!             Your Updated Medication List - Protect others around you: Learn how to safely use, store and throw away your medicines at  www.disposemymeds.org.          This list is accurate as of 6/21/18  1:27 PM.  Always use your most recent med list.                   Brand Name Dispense Instructions for use Diagnosis    diazepam 10 MG tablet    VALIUM    1 tablet    Take 30 min before mri    Leg weakness, bilateral       gabapentin 300 MG capsule    NEURONTIN    180 capsule    Take 1 capsule (300 mg) by mouth 2 times daily    Weakness of both lower extremities, Bilateral leg pain       HYDROcodone-acetaminophen 5-325 MG per tablet    NORCO    30 tablet    Take 1 tablet by mouth At Bedtime    Bilateral leg pain       lisinopril 5 MG tablet    PRINIVIL/ZESTRIL    90 tablet    Take 1 tablet (5 mg) by mouth daily    HTN, goal below 140/90       order for DME     2 Package    Equipment being ordered: Compression stockings 20-30mmHg    Lower extremity edema

## 2018-06-21 NOTE — LETTER
"2018       RE: Cristy Bailey  [address]  [place] [address]     Dear Colleague,    Thank you for referring your patient, Cristy Bailey, to the [facility]UROLOGY at [facility]. Please see a copy of my visit note below.    Service Date: 2018      Manoj Daley PA-C   [facility]    [address] [place] [address]      RE: Cristy Bailey   MRN: [id number]   : 1945      Dear Dr. Daley:       Thank you for referring Cristy Bailey for neurologic consultation on 2018.  The patient is a 73-year-old woman who comes with a chief complaint of bilateral leg weakness and pain.      The patient said that she started to notice trouble with walking about 6 years ago.  She said her gait became \"slower.\"  She started to have trouble with steps and actually quit going up them.  She said that eventually she started to use a walker and then 6-7 months ago she had to use a wheelchair.  Her partner, Leo Akbar, had to help her transfer.  He continues to support her.  She can recall having \"a little pain\" in the right groin 6 years ago.  This became extremely severe.  The patient ended up having recently an injection in the right hip and this did help, but it did not help her leg weakness.  She did see a neurologist about a year ago, but she said nothing was done.  She indicated that she could not get up on the exam table because of her pain and weakness.  She said that there was no followup with that individual.  She said that she felt that the pain probably did limit that person's ability to examine her.  She also had neurosurgical consultation, but ended up being referred to an orthopedist.  She saw that person who did inject her.  He has told her that she probably does need to have a right hip replacement.  The patient has had swelling in her feet the last 6 months.  She described her ankles and feet \"turning hard.\"  " "She did note that she had burning sensation, especially in the ankles that did seem to travel to the knees.  About a month ago, she noted a stinging sensation in the right hand.  This is always present now.  The patient has had in the past some low back pain in the last 6 months, but she downplayed it to me.  She has not really had pain higher in her spine.  She said that the pain in her lower back seems to come if she \"sits too much.\"  She does note that she has tried to urinate about every 3 hours.  Her biggest problem is getting to the toilet.  She has to have then from [employer]  She said that she has been incontinent only because she cannot get there in time.  She has had no trouble passing her stool.  The patient denied any decreased perirectal or genital sensation.  She is strong in her arms.  She has never had fasciculations.  The patient denied any trouble swallowing or speaking.  There is no history of ptosis or diplopia.  She has not had any skin rash.  She has never had any gastric surgery.  She has a normal diet.  She denied any change in bowel habit.  She has no serologic risk factors.      The patient did have records available to me through the Epic website.  She had consultation with Kevyn Nava on 03/20/2018.  He recorded she described a history of chronic worsening difficulties with leg weakness, inability to stand and concern for fall and pain.  He recorded the symptoms initially started in the feet and spread up to involve the entirety of the legs.  He noted too that more recently, she had similar symptoms in her arms, particularly in the proximal muscles.  He described her pain as being constant, 10/10.  The patient did have an evaluation and was thought to have weakness and myalgias.  He went on to state, \"Exam is limited due in part to pain, but from what I am able to examine, weakness was not particularly prominent and most muscles are full strength.  I am not sure what to make of her " "constellation of symptoms.\"  He suggested we start with a CK and an EMG.      The patient did have MRI scan on 05/14/2018 of the cervical spine.  I reviewed the actual films with her.  This was read by Dr. George.  This showed multilevel degenerative disk and facet disease, most advanced at C4-C5 and C5-C6 where there was mild to moderate central canal stenosis.  He noted there was also mild cord deformity at these levels.  There was no evidence for any intramedullary cord pathology.  I felt that she did have some degree of spinal stenosis but did not have jeffery high-grade stenosis.  She also had on 05/14/2018 MRI scan of the lumbar spine that showed multilevel degenerative changes with mild dextroscoliosis and mild central stenosis at L4-L5.  This was read by Dr. Syd Padron.  I agreed with that finding.      I reviewed the records of the orthopedist, Dr. Garcia, from 05/24/2018.  He said that the patient ambulates without an antalgic gait.  She is able to get on and off the exam table without difficulty.  He went on to state that he had ordered tests for serology and an order for a right hip intraarticular injection.      I have reviewed the records of Dr. Chris Gaytan.  The patient was seen by it gita Hill CNP.  Under assessment, \"The patient is a 72-year-old female with lumbar radicular pain.  She notes that she has had this pain for over 15 years, according to the record of the neurosurgeon.  She states that the pain has been increasing.  She notes pain in her lower back with radicular pain down into her right hip, lateral thigh and to the foot.  She noted numbness and tingling of the right leg as well.\"  Then they said the patient's lumbar MRI was reviewed in detail and no nerve impingement, cord compression or extrusions noted that would correlate with her pain.  Her previous pelvic x-ray showed collapse of the right femoral head.  Dr. Gaytan recommended she return to see Orthopedics.      The " patient's Epic records indicate that she has had treatment for Lyme disease.  She said she did have bulls-eye rashes twice, one time in the  and the second in .  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  She has not had Lyme's disease since that time.  She has had a history of hypertension.  The patient's past surgical history includes a negative biopsy of the uterus lining and she has had 3 C-sections.      The patient has been on gabapentin 300 mg b.i.d. for pain the last 6 months.  She said this has helped.  She also has been on at times p.r.n. Norco 5/325 mg for discomfort.  She takes lisinopril 5 mg for hypertension.  The patient has had no listed drug allergies.  She smoked for only a very short time.  The patient does not use alcohol.  She reviewed her family history and she said her mother had mental illness and was hospitalized at [facility].  She is not certain what happened to her.  Her father  at 84 from old age and had a previous history of stomach ulcers.  She had 8 brothers and sisters.  She has lost 2, 1 to old age, a brother at 84, and another brother at 50 from complications of heart disease and diabetes.  The patient has 3 children are alive and well.  She has lived with her partner, Leo Akbar, the last 30 years.  The patient retired from [employer].  She retired at 67.      The patient did review with me that she has done repetitive work with her hands including crocheting and sewing blankets and quilts.  She had to stop because of her right hand paresthesias.  She did not have nocturnal paresthesias intermittently before the constant paresthesias.  This involves the entire right hand.      The patient does have other diagnoses and has had a history of morbid obesity.  She said she weighed 140-150 pounds in high school.  She is 5 feet 6 inches tall.  She said in the last 10 years, she started to gain weight and weighs 250 pounds.  The patient has  had a prior history of CRP elevated noted on 05/17/2012 and then on the same date a vitamin D deficiency and did have on 08/02/2011 lipid disorder as well as abnormal glucose.      The patient's most recent labs showed that on 04/30/2018 her hemoglobin was 14.3.  She had normal electrolytes including creatinine of 0.58.  On 03/24/2015, she had an A1c hemoglobin of 5.9.  The patient had negative RPR on 04/30/2018 and on that date had normal metabolic profile with liver function tests.  She had a normal complete blood count, except for white blood cell count of 12,400.  Her MCV was 90.  On 02/08/2018, she had a normal complete blood count, including white blood cell count.  On 08/25/2017, she had negative Western blot Lyme's panel.  This was ordered because she had a high Lyme's titer of 3.41.  On 08/25/2017, her TSH was 2.00 and her CRP was elevated at 9.4.  Her CPK level was checked on 03/27/2018 and it was 29.      Neurologic examination revealed a pleasant woman.  She can get out of a chair and she could stand but is very hard for her to take a step and basically does not walk.  Otherwise, muscle stretch reflexes were normal in the arms.  She had negative Jag reflexes at the knees.  She had crossed adductor responses.  It was difficult to obtain internal hamstring reflexes.  Ankle jerks were present.  Her toes were downward going to plantar stimulation.  She could do finger-nose but could not do heel-to-shin.  The patient's strength examination was normal in the arms except for moderate right thenar weakness.  In the legs, she had only antigravity movement with a shrug of both iliopsoas muscles.  Surprisingly, throughout her legs, including hip abductors, hip adductors, hamstrings, quadriceps and distal legs were all strong.  The patient's cranial nerve examination was completely unremarkable.  She had normal superficial and cortical sensory testing except for decreased sensation to pinprick in both median  nerve distributions in the hands.  She had no sensory level.      IMPRESSION:   1.  Right hip necrosis.   2.  Bilateral proximal weakness.   3.  Carpal tunnel syndrome.      The patient has severe proximal weakness.  Some of this in the past has been influenced by the severity of her hip disease.  I am going to recommend now that she have an MRI scan of her thoracic spine to make sure she does not have any spinal cord compression.  EMG is to be tested of the legs.  She will have pelvic MRI scan of her hips.  This will also give some information about her iliopsoas muscles.  She could conceivably have a neurologic cause for her leg weakness but it is possible that she has severe bilateral hip disease.  She does have significant right carpal tunnel syndrome.  Depending on the test results, including EMG, I will make further recommendations.  I am going to see her back shortly.        Thank you for allowing me to see this patient.      Sincerely yours,       Johnnie Palafox MD      I spent 1 hour with the patient.  Over 50% of the time this involved counseling and coordination of care.        D: 2018   T: 2018   MT: AKA      Name:     LUCIANO JONES   MRN:      [id number]        Account:      [id number]   :      1945           Service Date: 2018      Document: [id number]       Again, thank you for allowing me to participate in the care of your patient.      Sincerely,    Johnnie Palafox MD

## 2018-06-25 ENCOUNTER — TELEPHONE (OUTPATIENT)
Dept: NEUROLOGY | Facility: CLINIC | Age: 73
End: 2018-06-25

## 2018-06-25 NOTE — PROGRESS NOTES
"Service Date: 2018      Manoj Daley PA-C   [facility]    [address] [address]      RE: Cristy Bailey   MRN: [id number]   : 1945      Dear Dr. Daley:       Thank you for referring Cristy Bailey for neurologic consultation on 2018.  The patient is a 73-year-old woman who comes with a chief complaint of bilateral leg weakness and pain.      The patient said that she started to notice trouble with walking about 6 years ago.  She said her gait became \"slower.\"  She started to have trouble with steps and actually quit going up them.  She said that eventually she started to use a walker and then 6-7 months ago she had to use a wheelchair.  Her partner, Leo Akbar, had to help her transfer.  He continues to support her.  She can recall having \"a little pain\" in the right groin 6 years ago.  This became extremely severe.  The patient ended up having recently an injection in the right hip and this did help, but it did not help her leg weakness.  She did see a neurologist about a year ago, but she said nothing was done.  She indicated that she could not get up on the exam table because of her pain and weakness.  She said that there was no followup with that individual.  She said that she felt that the pain probably did limit that person's ability to examine her.  She also had neurosurgical consultation, but ended up being referred to an orthopedist.  She saw that person who did inject her.  He has told her that she probably does need to have a right hip replacement.  The patient has had swelling in her feet the last 6 months.  She described her ankles and feet \"turning hard.\"  She did note that she had burning sensation, especially in the ankles that did seem to travel to the knees.  About a month ago, she noted a stinging sensation in the right hand.  This is always present now.  The patient has had in the past some low back pain in the last 6 months, but " "she downplayed it to me.  She has not really had pain higher in her spine.  She said that the pain in her lower back seems to come if she \"sits too much.\"  She does note that she has tried to urinate about every 3 hours.  Her biggest problem is getting to the toilet.  She has to have then from Leo.  She said that she has been incontinent only because she cannot get there in time.  She has had no trouble passing her stool.  The patient denied any decreased perirectal or genital sensation.  She is strong in her arms.  She has never had fasciculations.  The patient denied any trouble swallowing or speaking.  There is no history of ptosis or diplopia.  She has not had any skin rash.  She has never had any gastric surgery.  She has a normal diet.  She denied any change in bowel habit.  She has no serologic risk factors.      The patient did have records available to me through the Epic website.  She had consultation with Kevyn Nava on 03/20/2018.  He recorded she described a history of chronic worsening difficulties with leg weakness, inability to stand and concern for fall and pain.  He recorded the symptoms initially started in the feet and spread up to involve the entirety of the legs.  He noted too that more recently, she had similar symptoms in her arms, particularly in the proximal muscles.  He described her pain as being constant, 10/10.  The patient did have an evaluation and was thought to have weakness and myalgias.  He went on to state, \"Exam is limited due in part to pain, but from what I am able to examine, weakness was not particularly prominent and most muscles are full strength.  I am not sure what to make of her constellation of symptoms.\"  He suggested we start with a CK and an EMG.      The patient did have MRI scan on 05/14/2018 of the cervical spine.  I reviewed the actual films with her.  This was read by Dr. George.  This showed multilevel degenerative disk and facet disease, most advanced at " "C4-C5 and C5-C6 where there was mild to moderate central canal stenosis.  He noted there was also mild cord deformity at these levels.  There was no evidence for any intramedullary cord pathology.  I felt that she did have some degree of spinal stenosis but did not have jeffery high-grade stenosis.  She also had on 05/14/2018 MRI scan of the lumbar spine that showed multilevel degenerative changes with mild dextroscoliosis and mild central stenosis at L4-L5.  This was read by Dr. Syd Padron.  I agreed with that finding.      I reviewed the records of the orthopedist, Dr. Garcia, from 05/24/2018.  He said that the patient ambulates without an antalgic gait.  She is able to get on and off the exam table without difficulty.  He went on to state that he had ordered tests for serology and an order for a right hip intraarticular injection.      I have reviewed the records of Dr. Chris Gaytan.  The patient was seen by yousif Hill CNP.  Under assessment, \"The patient is a 72-year-old female with lumbar radicular pain.  She notes that she has had this pain for over 15 years, according to the record of the neurosurgeon.  She states that the pain has been increasing.  She notes pain in her lower back with radicular pain down into her right hip, lateral thigh and to the foot.  She noted numbness and tingling of the right leg as well.\"  Then they said the patient's lumbar MRI was reviewed in detail and no nerve impingement, cord compression or extrusions noted that would correlate with her pain.  Her previous pelvic x-ray showed collapse of the right femoral head.  Dr. Gaytan recommended she return to see Orthopedics.      The patient's Epic records indicate that she has had treatment for Lyme disease.  She said she did have bulls-eye rashes twice, one time in the 1980s and the second in 2004.  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  She has not had Lyme's disease since that time.  " She has had a history of hypertension.  The patient's past surgical history includes a negative biopsy of the uterus lining and she has had 3 C-sections.      The patient has been on gabapentin 300 mg b.i.d. for pain the last 6 months.  She said this has helped.  She also has been on at times p.r.n. Norco 5/325 mg for discomfort.  She takes lisinopril 5 mg for hypertension.  The patient has had no listed drug allergies.  She smoked for only a very short time.  The patient does not use alcohol.  She reviewed her family history and she said her mother had mental illness and was hospitalized at [place].  She is not certain what happened to her.  Her father  at 84 from old age and had a previous history of stomach ulcers.  She had 8 brothers and sisters.  She has lost 2, 1 to old age, a brother at 84, and another brother at 50 from complications of heart disease and diabetes.  The patient has 3 children are alive and well.  She has lived with her partner, Leo Akbar, the last 30 years.  The patient retired from [employer].  She retired at 67.      The patient did review with me that she has done repetitive work with her hands including crocheting and sewing blankets and quilts.  She had to stop because of her right hand paresthesias.  She did not have nocturnal paresthesias intermittently before the constant paresthesias.  This involves the entire right hand.      The patient does have other diagnoses and has had a history of morbid obesity.  She said she weighed 140-150 pounds in high school.  She is 5 feet 6 inches tall.  She said in the last 10 years, she started to gain weight and weighs 250 pounds.  The patient has had a prior history of CRP elevated noted on 2012 and then on the same date a vitamin D deficiency and did have on 2011 lipid disorder as well as abnormal glucose.      The patient's most recent labs showed that on 2018 her hemoglobin was 14.3.  She had normal  electrolytes including creatinine of 0.58.  On 03/24/2015, she had an A1c hemoglobin of 5.9.  The patient had negative RPR on 04/30/2018 and on that date had normal metabolic profile with liver function tests.  She had a normal complete blood count, except for white blood cell count of 12,400.  Her MCV was 90.  On 02/08/2018, she had a normal complete blood count, including white blood cell count.  On 08/25/2017, she had negative Western blot Lyme's panel.  This was ordered because she had a high Lyme's titer of 3.41.  On 08/25/2017, her TSH was 2.00 and her CRP was elevated at 9.4.  Her CPK level was checked on 03/27/2018 and it was 29.      Neurologic examination revealed a pleasant woman.  She can get out of a chair and she could stand but is very hard for her to take a step and basically does not walk.  Otherwise, muscle stretch reflexes were normal in the arms.  She had negative Jag reflexes at the knees.  She had crossed adductor responses.  It was difficult to obtain internal hamstring reflexes.  Ankle jerks were present.  Her toes were downward going to plantar stimulation.  She could do finger-nose but could not do heel-to-shin.  The patient's strength examination was normal in the arms except for moderate right thenar weakness.  In the legs, she had only antigravity movement with a shrug of both iliopsoas muscles.  Surprisingly, throughout her legs, including hip abductors, hip adductors, hamstrings, quadriceps and distal legs were all strong.  The patient's cranial nerve examination was completely unremarkable.  She had normal superficial and cortical sensory testing except for decreased sensation to pinprick in both median nerve distributions in the hands.  She had no sensory level.      IMPRESSION:   1.  Right hip necrosis.   2.  Bilateral proximal weakness.   3.  Carpal tunnel syndrome.      The patient has severe proximal weakness.  Some of this in the past has been influenced by the severity of  her hip disease.  I am going to recommend now that she have an MRI scan of her thoracic spine to make sure she does not have any spinal cord compression.  EMG is to be tested of the legs.  She will have pelvic MRI scan of her hips.  This will also give some information about her iliopsoas muscles.  She could conceivably have a neurologic cause for her leg weakness but it is possible that she has severe bilateral hip disease.  She does have significant right carpal tunnel syndrome.  Depending on the test results, including EMG, I will make further recommendations.  I am going to see her back shortly.        Thank you for allowing me to see this patient.      Sincerely yours,       Meredith Palafox MD      I spent 1 hour with the patient.  Over 50% of the time this involved counseling and coordination of care.         MEREDITH PALAFOX MD             D: 2018   T: 2018   MT: AKA      Name:     LUCIANO JONES   MRN:      [id number]        Account:      [id number]   :      1945           Service Date: 2018      Document: [id number]

## 2018-06-25 NOTE — TELEPHONE ENCOUNTER
[facility] Call Center    Phone Message    May a detailed message be left on voicemail: no    Reason for Call: Medication Refill Request    Has the patient contacted the pharmacy for the refill? Yes   Name of medication being requested: Volume  Provider who prescribed the medication: Vivienne  [facility]: [facility]  Date medication is needed: July 1st        Action Taken: Message routed to:  [facility] ([facility] neuro

## 2018-06-26 ENCOUNTER — PRE VISIT (OUTPATIENT)
Dept: ORTHOPEDICS | Facility: CLINIC | Age: 73
End: 2018-06-26

## 2018-06-26 ENCOUNTER — PATIENT OUTREACH (OUTPATIENT)
Dept: CARE COORDINATION | Facility: CLINIC | Age: 73
End: 2018-06-26

## 2018-06-26 NOTE — TELEPHONE ENCOUNTER
FUTURE VISIT INFORMATION      FUTURE VISIT INFORMATION:    Date: 6/29    Time: 10:40    Location: [facility]  REFERRAL INFORMATION:    Referring provider:  Johnnie Palafox    Referring providers clinic:  [facility] Neurology    Reason for visit/diagnosis  Carpal tunnel    RECORDS REQUESTED FROM:       Clinic name Comments Records Status Imaging Status   [facility] neurology      EMG                                RECORDS STATUS      All records internal

## 2018-06-26 NOTE — PROGRESS NOTES
Clinic Care Coordination Contact    Clinic Care Coordination Contact  OUTREACH    Referral Information:    Chief Complaint   Patient presents with     Clinic Care Coordination - Follow-up        Universal Utilization: Appropriate utilization     Utilization    Last refreshed: 6/26/2018 11:26 AM:  No Show Count (past year) 1       Last refreshed: 6/26/2018 11:26 AM:  ED visits 2       Last refreshed: 6/26/2018 11:26 AM:  Hospital admissions 0          Current as of: 6/26/2018 11:26 AM           Clinical Concerns:  Current Medical Concerns:  Is getting some relief from the hip pain after getting hip injection. She cannot get up herself, but can stand up better.  Was told that a second injection would be helpful at the time of the first injection.  She will send a my chart message to Dr Garcia to see if he would order one.  Reviewed upcoming appointments and what they are for.      Met with neurologist and found that he did a very thorough exam.  He told her that she has carpal tunnel in her hand and that is what is causing her hand tingling. She has appointments set up for carpal tunnel and needs to have complete MRI of her back to determine what is causing her leg problem.  She wants to focus mostly on her leg pain.     Current Behavioral Concerns: Is doing better as she is making some progress to determine source of pain.      Education Provided to patient: Discussed tests for carpal tunnel.       Health Maintenance Reviewed:    Clinical Pathway: None    Medication Management:  Independent.  No concerns.      Functional Status:   Not addressed during this call.    Living Situation:   Living with Leo who is her caregiver.     Diet/Exercise/Sleep:   No addressed during this call.    Transportation:  Leo drives her in his car.      Psychosocial:   No needs identified.      Financial/Insurance:   [facility] [employer]   Resources and Interventions:  Current Resources:    Has been approved for waiver but has not heard  from Lillie Mobley,  with [facility] since the first call on June 11. She did not call her after last contact with CC as she didn't know what to ask her.     Goals:   Goals        General    Pain Management (pt-stated)     Notes - Note created  4/5/2018  2:53 PM by Francisca Hernandez LSW    Goal Statement: I will have less pain.  Measure of Success: Will have intervention that addresses my pain.   Supportive Steps to Achieve: CC will contact PCP to discuss patient concerns and next steps.   Barriers: Cannot leave the house without extreme effort. Is having extreme pain and does not have any sense that it will get better. Her  is her caregiver and he is frustrated with her pain.  Strengths: Has  who supports her.    Date to Achieve By: 9-1-2018              Patient/Caregiver understanding: CC called Lillie and left message asking her to call patient to discuss services through the waiver.  Patient will attend the appointments and will communicate with her care team.        Future Appointments              In 2 days Navneet Pina MD [facility]    In 2 days Navneet Pina MD [facility]    In 3 days Bry Ball MD [facility]    In 5 days [address] [facility]    In 5 days [address] [facility]    In 3 weeks Johnnie Palafox MD [facility] Neurology, [facility]          Plan: CC to call in 10 days.  Patient to call CC if needs arise prior to next outreach.      Francisca Hernandez,   [facility]  [email or web address]  [phone]

## 2018-06-27 DIAGNOSIS — R29.898 WEAKNESS OF BOTH LOWER EXTREMITIES: Primary | ICD-10-CM

## 2018-06-27 RX ORDER — DIAZEPAM 10 MG
10 TABLET ORAL EVERY 6 HOURS PRN
Qty: 1 TABLET | Refills: 0 | Status: SHIPPED | OUTPATIENT
Start: 2018-06-27 | End: 2018-07-28

## 2018-06-28 ENCOUNTER — TELEPHONE (OUTPATIENT)
Dept: NEUROLOGY | Facility: CLINIC | Age: 73
End: 2018-06-28

## 2018-06-28 ENCOUNTER — OFFICE VISIT (OUTPATIENT)
Dept: NEUROLOGY | Facility: CLINIC | Age: 73
End: 2018-06-28
Payer: COMMERCIAL

## 2018-06-28 DIAGNOSIS — G56.00 CARPAL TUNNEL SYNDROME, UNSPECIFIED LATERALITY: ICD-10-CM

## 2018-06-28 NOTE — TELEPHONE ENCOUNTER
Called pharmacy and gave verbal order from Dr. Palafox for the 10 mg diazepam to be taken by mouth 30-60 minutes before procedure and not to operate any vehicle after taking medication.

## 2018-06-28 NOTE — LETTER
6/28/2018       RE: Cristy Bailey  [address]  [place] [address]     Dear Colleague,    Thank you for referring your patient, Cristy Bailey, to the [facility] EMG at [facility]. Please see a copy of my visit note below.        [facility]  Electrodiagnostic Laboratory    Nerve Conduction & EMG Report      Patient: Cristy Bailey YOB: 1945  Patient ID: [id number] Age: 73 Years 0 Months  Gender: Female      History & Examination: 73-year-old female with a history of calf pain and distal foot numbness.  She also complains of numbness in the right arm.    Techniques:  Motor conduction studies were done with surface recording electrodes. Sensory conduction studies were performed with surface electrodes, unless indicated otherwise by (n), designating the use of subdermal recording electrodes. Temperature was monitored and recorded throughout the study. Upper extremities were maintained at a temperature of 32 degrees Centigrade or higher. Lower extremities were maintained at a temperature of 31 degrees Centigrade or higher. EMG was done with a concentric needle electrode.     Results: The right median sensory nerve action potential was of low amplitude and its distal conduction velocity slowed.  Right and left sural sensory nerve action potentials were of low amplitude but normal for age.  Superficial peroneal sensory nerve action potentials were normal.  The right median motor distal latency was prolonged.  The right ulnar motor distal latency was minimally prolonged.  The right and left peroneal compound motor action potentials were reduced in amplitude.  Distal latency and conduction velocities were preserved.  Tibial motor conduction studies were normal.  The median/lumbrical to ulnar/second palmar interosseous comparison study revealed distal median slowing at the wrists bilaterally.    Needle exam revealed prolongation of motor unit  potential durations in L5-S1 muscles distally and in a proximal L5 muscle as well.  The gluteus onelia muscle could not be properly examined there were also long duration motor unit potentials in right C7 innervated muscles.  No fibrillation was observed in any muscle.    Impression:   The EMG is abnormal.  There is EMG evidence for mild bilateral L5-S1 radiculopathies with no evidence for active denervation.  There are bilateral median neuropathies at the wrists (carpal tunnel syndrome) worse on the right than the left.  There is also evidence for a mild chronic right C7 radiculopathy.    Examiner: Navneet Pina MD                      Sensory NCS      Nerve / Sites Rec. Site Onset Peak NP Amp Ref. PP Amp Dist Hector Ref. Temp     ms ms  V  V  V cm m/s m/s  C   R MEDIAN - Dig II Anti      Wrist Dig II 5.31 6.61 4.6 10.0 11.6 14 26.4 48.0 32.2   R ULNAR - Dig V Anti      Wrist Dig V 2.50 3.65 13.7 8.0 71.9 12.5 50.0 48.0 32   R SURAL - Lat Mall 60      (tcncl limit) Calf Ankle 3.28 3.75 2.9 5.0 4.9 14 42.7 38.0 28.1      (tcncl limit) Calf Ankle 3.39 4.27 3.8 5.0 6.8 14 41.4  28.1   R SUP PERONEAL      Lat Leg Kevin 2.92 3.70 5.5  6.9 12.5 42.9 38.0 28.7   L SUP PERONEAL      Lat Leg Kevin 3.13 4.43 3.9  3.3 12.5 40.0 38.0 28.7       Motor NCS      Nerve / Sites Rec. Site Lat Ref. Amp Ref. Rel Amp Dist Hector Ref. Dur. Area Temp.     ms ms mV mV % cm m/s m/s ms %  C   R MEDIAN - APB      Wrist APB 6.35 4.40 5.3 5.0 100 8   6.35 100 31.9      Elbow APB 10.31  5.4  102 20 50.5 48.0 6.20 96.8 32   R ULNAR - ADM      Wrist ADM 3.65 3.50 12.9 5.0 100 8   6.41 100 31.1      B.Elbow ADM 7.45  10.9  84.3 21 55.2 48.0 5.89 87 31.1      A.Elbow ADM 9.06  10.4  80.8 9 55.7 48.0 6.09 86.6 31.2   R DEEP PERONEAL - EDB 60      Ankle EDB 3.70 6.00 1.5 2.0 100 8   6.09 100 28.4      (techncl) FibHead EDB 11.82  1.2  80.6 36 44.3 38.0 6.41 74.3 28.4      Pop Fos EDB 13.28  1.3  87.1 8 54.9 38.0 6.25 85.7 28.5   L DEEP PERONEAL - EDB 60       Ankle EDB 3.70 6.00 1.4 2.0 100 8   7.66 100 28.5   R TIBIAL - AH      Ankle AH 3.85 6.00 8.9 4.0 100 8   6.93 100 28.4      Pop Fos AH 13.13  6.8  76 40 43.1 38.0 8.18 88.4 28.5   L TIBIAL - AH      Ankle AH 2.60 6.00 14.4 4.0 100 8   7.45 100 28.1   R MEDIAN - II Lumb      Median II Lumb 5.83  0.3  100 10   6.82  31.6      Ulnar Palm Int 4.01  0.6  249 10   5.31  31.6   L MEDIAN - II Lumb      Median II Lumb 4.32  2.1  100 10   4.69 100 30.8      Ulnar Palm Int 3.44  3.0  147 10   5.26 147 30.8       F  Wave      Nerve Min F Lat Max F Lat Mean FLat Temp.    ms ms ms  C   R TIBIAL 53.75 56.41 54.53 28.7       Needle EMG (W)          EMG Summary Table     Spontaneous MUAP Recruitment    IA Fib PSW Fasc H.F. Amp Dur. PPP Pattern   R. TIB ANTERIOR N None None None None N 1+ None Normal   R. GASTROCN (MED) N None None None None N 1+ None Normal   R. VAST LATERALIS N None None None None N N None Normal   L. TIB ANTERIOR N None None None None N 1+ None Normal   L. GASTROCN (MED) N None None None None N 1+ None Normal   R. T FASCIA MIKE N None None None None N 1+ None Normal   R. FIRST D INTEROSS N None None None None N N None Normal   R. TRICEPS N None None None None N 1+ None Normal   R. BICEPS N None None None None N N None Normal   R. PRON TERES N None None None None 1+ 1+ None Mild Reduced   R. GLUTEUS MAX N None None None None    No Activity                                        Again, thank you for allowing me to participate in the care of your patient.      Sincerely,    Navneet Pina MD

## 2018-06-28 NOTE — TELEPHONE ENCOUNTER
Left her a voicemail today 6/28/18 at 0834 requesting her to call back to clarify which pharmacy she would like me to call in the diazepam medication

## 2018-06-28 NOTE — PROGRESS NOTES
[facility]  Electrodiagnostic Laboratory    Nerve Conduction & EMG Report          Patient: Cristy Bailey YOB: 1945  Patient ID: [id number] Age: 73 Years 0 Months  Gender: Female      History & Examination: 73-year-old female with a history of calf pain and distal foot numbness.  She also complains of numbness in the right arm.    Techniques:  Motor conduction studies were done with surface recording electrodes. Sensory conduction studies were performed with surface electrodes, unless indicated otherwise by (n), designating the use of subdermal recording electrodes. Temperature was monitored and recorded throughout the study. Upper extremities were maintained at a temperature of 32 degrees Centigrade or higher. Lower extremities were maintained at a temperature of 31 degrees Centigrade or higher. EMG was done with a concentric needle electrode.     Results: The right median sensory nerve action potential was of low amplitude and its distal conduction velocity slowed.  Right and left sural sensory nerve action potentials were of low amplitude but normal for age.  Superficial peroneal sensory nerve action potentials were normal.  The right median motor distal latency was prolonged.  The right ulnar motor distal latency was minimally prolonged.  The right and left peroneal compound motor action potentials were reduced in amplitude.  Distal latency and conduction velocities were preserved.  Tibial motor conduction studies were normal.  The median/lumbrical to ulnar/second palmar interosseous comparison study revealed distal median slowing at the wrists bilaterally.    Needle exam revealed prolongation of motor unit potential durations in L5-S1 muscles distally and in a proximal L5 muscle as well.  The gluteus onelia muscle could not be properly examined there were also long duration motor unit potentials in right C7 innervated muscles.  No fibrillation was observed in any  muscle.    Impression:   The EMG is abnormal.  There is EMG evidence for mild bilateral L5-S1 radiculopathies with no evidence for active denervation.  There are bilateral median neuropathies at the wrists (carpal tunnel syndrome) worse on the right than the left.  There is also evidence for a mild chronic right C7 radiculopathy.    Examiner: Navneet Pina MD                      Sensory NCS      Nerve / Sites Rec. Site Onset Peak NP Amp Ref. PP Amp Dist Hector Ref. Temp     ms ms  V  V  V cm m/s m/s  C   R MEDIAN - Dig II Anti      Wrist Dig II 5.31 6.61 4.6 10.0 11.6 14 26.4 48.0 32.2   R ULNAR - Dig V Anti      Wrist Dig V 2.50 3.65 13.7 8.0 71.9 12.5 50.0 48.0 32   R SURAL - Lat Mall 60      (tcncl limit) Calf Ankle 3.28 3.75 2.9 5.0 4.9 14 42.7 38.0 28.1      (tcncl limit) Calf Ankle 3.39 4.27 3.8 5.0 6.8 14 41.4  28.1   R SUP PERONEAL      Lat Leg Kevin 2.92 3.70 5.5  6.9 12.5 42.9 38.0 28.7   L SUP PERONEAL      Lat Leg Kevin 3.13 4.43 3.9  3.3 12.5 40.0 38.0 28.7       Motor NCS      Nerve / Sites Rec. Site Lat Ref. Amp Ref. Rel Amp Dist Hector Ref. Dur. Area Temp.     ms ms mV mV % cm m/s m/s ms %  C   R MEDIAN - APB      Wrist APB 6.35 4.40 5.3 5.0 100 8   6.35 100 31.9      Elbow APB 10.31  5.4  102 20 50.5 48.0 6.20 96.8 32   R ULNAR - ADM      Wrist ADM 3.65 3.50 12.9 5.0 100 8   6.41 100 31.1      B.Elbow ADM 7.45  10.9  84.3 21 55.2 48.0 5.89 87 31.1      A.Elbow ADM 9.06  10.4  80.8 9 55.7 48.0 6.09 86.6 31.2   R DEEP PERONEAL - EDB 60      Ankle EDB 3.70 6.00 1.5 2.0 100 8   6.09 100 28.4      (techncl) FibHead EDB 11.82  1.2  80.6 36 44.3 38.0 6.41 74.3 28.4      Pop Fos EDB 13.28  1.3  87.1 8 54.9 38.0 6.25 85.7 28.5   L DEEP PERONEAL - EDB 60      Ankle EDB 3.70 6.00 1.4 2.0 100 8   7.66 100 28.5   R TIBIAL - AH      Ankle AH 3.85 6.00 8.9 4.0 100 8   6.93 100 28.4      Pop Fos AH 13.13  6.8  76 40 43.1 38.0 8.18 88.4 28.5   L TIBIAL - AH      Ankle AH 2.60 6.00 14.4 4.0 100 8   7.45 100 28.1   R  MEDIAN - II Lumb      Median II Lumb 5.83  0.3  100 10   6.82  31.6      Ulnar Palm Int 4.01  0.6  249 10   5.31  31.6   L MEDIAN - II Lumb      Median II Lumb 4.32  2.1  100 10   4.69 100 30.8      Ulnar Palm Int 3.44  3.0  147 10   5.26 147 30.8       F  Wave      Nerve Min F Lat Max F Lat Mean FLat Temp.    ms ms ms  C   R TIBIAL 53.75 56.41 54.53 28.7       Needle EMG (W)          EMG Summary Table     Spontaneous MUAP Recruitment    IA Fib PSW Fasc H.F. Amp Dur. PPP Pattern   R. TIB ANTERIOR N None None None None N 1+ None Normal   R. GASTROCN (MED) N None None None None N 1+ None Normal   R. VAST LATERALIS N None None None None N N None Normal   L. TIB ANTERIOR N None None None None N 1+ None Normal   L. GASTROCN (MED) N None None None None N 1+ None Normal   R. T FASCIA MIKE N None None None None N 1+ None Normal   R. FIRST D INTEROSS N None None None None N N None Normal   R. TRICEPS N None None None None N 1+ None Normal   R. BICEPS N None None None None N N None Normal   R. PRON TERES N None None None None 1+ 1+ None Mild Reduced   R. GLUTEUS MAX N None None None None    No Activity

## 2018-06-28 NOTE — MR AVS SNAPSHOT
After Visit Summary   6/28/2018    Cristy Bailey    MRN: [id number]           Patient Information     Date Of Birth          1945        Visit Information        Provider Department      6/28/2018 9:00 AM Navneet Pina MD [facility]        Today's Diagnoses     Carpal tunnel syndrome, unspecified laterality           Follow-ups after your visit        Your next 10 appointments already scheduled     Jun 29, 2018 10:40 AM CDT   (Arrive by 10:25 AM)   NEW HAND with Bry Ball MD   [facility] ([facility])    [address]  4th Floor  [place] [address] [phone]            Jul 01, 2018 10:45 AM CDT   MR THORACIC SPINE W/O & W CONTRAST with [id number]   [facility] ([facility])    [address]  1st Floor  [place] [address] [phone]           Take your medicines as usual, unless your doctor tells you not to. Bring a list of your current medicines to your exam (including vitamins, minerals and over-the-counter drugs).  You may or may not receive intravenous (IV) contrast for this exam pending the discretion of the Radiologist.  You do not need to do anything special to prepare.  The MRI machine uses a strong magnet. Please wear clothes without metal (snaps, zippers). A sweatsuit works well, or we may give you a hospital gown.  Please remove any body piercings and hair extensions before you arrive. You will also remove watches, jewelry, hairpins, wallets, dentures, partial dental plates and hearing aids. You may wear contact lenses, and you may be able to wear your rings. We have a safe place to keep your personal items, but it is safer to leave them at home.  **IMPORTANT** THE INSTRUCTIONS BELOW ARE ONLY FOR THOSE PATIENTS WHO HAVE BEEN PRESCRIBED SEDATION OR GENERAL ANESTHESIA DURING THEIR MRI PROCEDURE:  IF YOUR DOCTOR PRESCRIBED ORAL SEDATION (take medicine to help  you relax during your exam):   You must get the medicine from your doctor (oral medication) before you arrive. Bring the medicine to the exam. Do not take it at home. You ll be told when to take it upon arriving for your exam.   Arrive one hour early. Bring someone who can take you home after the test. Your medicine will make you sleepy. After the exam, you may not drive, take a bus or take a taxi by yourself.  IF YOUR DOCTOR PRESCRIBED IV SEDATION:   Arrive one hour early. Bring someone who can take you home after the test. Your medicine will make you sleepy. After the exam, you may not drive, take a bus or take a taxi by yourself.   No eating 6 hours before your exam. You may have clear liquids up until 4 hours before your exam. (Clear liquids include water, clear tea, black coffee and fruit juice without pulp.)  IF YOUR DOCTOR PRESCRIBED ANESTHESIA (be asleep for your exam):   Arrive 1 1/2 hours early. Bring someone who can take you home after the test. You may not drive, take a bus or take a taxi by yourself.   No eating 8 hours before your exam. You may have clear liquids up until 4 hours before your exam. (Clear liquids include water, clear tea, black coffee and fruit juice without pulp.)   You will spend four to five hours in the recovery room.  Please call the Imaging Department at your exam site with any questions.            Jul 01, 2018 11:30 AM CDT   MR HIP BILATERAL with [id number]   [facility] ([facility])    [address] [address] [phone]           Take your medicines as usual, unless your doctor tells you not to. Bring a list of your current medicines to your exam (including vitamins, minerals and over-the-counter drugs). Also bring the results of similar scans you may have had.  Please remove any body piercings and hair extensions before you arrive.  Follow your doctor s orders. If you do not, we may have to postpone  your exam.  You may or may not receive IV contrast for this exam pending the discretion of the Radiologist.  You do not need to do anything special to prepare.  The MRI machine uses a strong magnet. Please wear clothes without metal (snaps, zippers). A sweatsuit works well, or we may give you a hospital gown.   **IMPORTANT** THE INSTRUCTIONS BELOW ARE ONLY FOR THOSE PATIENTS WHO HAVE BEEN PRESCRIBED SEDATION OR GENERAL ANESTHESIA DURING THEIR MRI PROCEDURE:  IF YOUR DOCTOR PRESCRIBED ORAL SEDATION (take medicine to help you relax during your exam):   You must get the medicine from your doctor (oral medication) before you arrive. Bring the medicine to the exam. Do not take it at home. You ll be told when to take it upon arriving for your exam.   Arrive one hour early. Bring someone who can take you home after the test. Your medicine will make you sleepy. After the exam, you may not drive, take a bus or take a taxi by yourself.  IF YOUR DOCTOR PRESCRIBED IV SEDATION:   Arrive one hour early. Bring someone who can take you home after the test. Your medicine will make you sleepy. After the exam, you may not drive, take a bus or take a taxi by yourself.   No eating 6 hours before your exam. You may have clear liquids up until 4 hours before your exam. (Clear liquids include water, clear tea, black coffee and fruit juice without pulp.)  IF YOUR DOCTOR PRESCRIBED ANESTHESIA (be asleep for your exam):   Arrive 1 1/2 hours early. Bring someone who can take you home after the test. You may not drive, take a bus or take a taxi by yourself.   No eating 8 hours before your exam. You may have clear liquids up until 4 hours before your exam. (Clear liquids include water, clear tea, black coffee and fruit juice without pulp.)   You will spend four to five hours in the recovery room.  Please call the Imaging Department at your exam site with any questions.            Jul 20, 2018 10:00 AM CDT   (Arrive by 9:45 AM)   Return Visit  with Johnnie Palafox MD   [facility] ([facility])    [address]  3rd [facility] [address] [phone]              Future tests that were ordered for you today     Open Future Orders        Priority Expected Expires Ordered    NCS Motor with or without F-Wave, 11-12 Nerves Routine  6/28/2019 6/28/2018    Needle EMG Each Extremity w/Paraspinal Area Complete (37815) Routine  6/28/2019 6/28/2018    Needle EMG Each Extremity w/Paraspinal Area Limited (01891) Routine  6/28/2019 6/28/2018            Who to contact     Please call your clinic at [phone] to:    Ask questions about your health    Make or cancel appointments    Discuss your medicines    Learn about your test results    Speak to your doctor            Additional Information About Your Visit        [employer] Information     [employer] gives you secure access to your electronic health record. If you see a primary care provider, you can also send messages to your care team and make appointments. If you have questions, please call your primary care clinic.  If you do not have a primary care provider, please call [phone] and they will assist you.      [employer] is an electronic gateway that provides easy, online access to your medical records. With [employer], you can request a clinic appointment, read your test results, renew a prescription or communicate with your care team.     To access your existing account, please contact your [facility] or call [phone] for assistance.        Care EveryWhere ID     This is your Care EveryWhere ID. This could be used by other organizations to access your [facility] medical records  [id number]         Blood Pressure from Last 3 Encounters:   06/21/18 132/86   06/20/18 141/87   06/08/18 144/82    Weight from Last 3 Encounters:   05/24/18 119.3 kg (263 lb)   05/10/18 119.3 kg (263 lb)   05/10/18 119.3 kg (263 lb)              We  Performed the Following     EMG        Primary Care Provider Office Phone # Fax #    Manoj Robert Daley PA-C [phone]       [address] [facility] [address]        Goals        General    Pain Management (pt-stated)     Notes - Note created  4/5/2018  2:53 PM by Francisca Hernandez LSW    Goal Statement: I will have less pain.  Measure of Success: Will have intervention that addresses my pain.   Supportive Steps to Achieve: CC will contact PCP to discuss patient concerns and next steps.   Barriers: Cannot leave the house without extreme effort. Is having extreme pain and does not have any sense that it will get better. Her  is her caregiver and he is frustrated with her pain.  Strengths: Has  who supports her.    Date to Achieve By: 9-1-2018        Equal Access to Services     MICHELLE WISE : Janet Martin, watobin esposito, elsi kaalpeewee mendez, larry camp. So [facility] [phone].    ATENCIÓN: Si habla español, tiene a pompa disposición servicios gratuitos de asistencia lingüística. Llame al [phone].    We comply with applicable federal civil rights laws and [place] laws. We do not discriminate on the basis of race, color, national origin, age, disability, sex, sexual orientation, or gender identity.            Thank you!     Thank you for choosing [facility]  for your care. Our goal is always to provide you with excellent care. Hearing back from our patients is one way we can continue to improve our services. Please take a few minutes to complete the written survey that you may receive in the mail after your visit with us. Thank you!             Your Updated Medication List - Protect others around you: Learn how to safely use, store and throw away your medicines at www.disposemymeds.org.          This list is accurate as of 6/28/18 10:54 AM.  Always use your most recent med list.                   Brand Name Dispense  Instructions for use Diagnosis    * diazepam 10 MG tablet    VALIUM    1 tablet    Take 30 min before mri    Leg weakness, bilateral       * diazepam 10 MG tablet    VALIUM    1 tablet    Take 1 tablet (10 mg) by mouth every 6 hours as needed for anxiety or sleep Take 30-60 minutes before procedure.  Do not operate a vehicle after taking this medication.    Weakness of both lower extremities       gabapentin 300 MG capsule    NEURONTIN    180 capsule    Take 1 capsule (300 mg) by mouth 2 times daily    Weakness of both lower extremities, Bilateral leg pain       HYDROcodone-acetaminophen 5-325 MG per tablet    NORCO    30 tablet    Take 1 tablet by mouth At Bedtime    Bilateral leg pain       lisinopril 5 MG tablet    PRINIVIL/ZESTRIL    90 tablet    Take 1 tablet (5 mg) by mouth daily    HTN, goal below 140/90       order for DME     2 Package    Equipment being ordered: Compression stockings 20-30mmHg    Lower extremity edema       * Notice:  This list has 2 medication(s) that are the same as other medications prescribed for you. Read the directions carefully, and ask your doctor or other care provider to review them with you.

## 2018-06-28 NOTE — TELEPHONE ENCOUNTER
[facility] Call Center    Phone Message    May a detailed message be left on voicemail: no    Reason for Call: Other: Patient called in returning a call to [facility]. Patient would like RX sent to [facility].      Action Taken: Message routed to:  [facility] ([facility] Neuro;

## 2018-06-29 ENCOUNTER — OFFICE VISIT (OUTPATIENT)
Dept: ORTHOPEDICS | Facility: CLINIC | Age: 73
End: 2018-06-29
Payer: COMMERCIAL

## 2018-06-29 ENCOUNTER — TELEPHONE (OUTPATIENT)
Dept: NEUROLOGY | Facility: CLINIC | Age: 73
End: 2018-06-29

## 2018-06-29 VITALS — WEIGHT: 255 LBS | BODY MASS INDEX: 40.98 KG/M2 | HEIGHT: 66 IN

## 2018-06-29 DIAGNOSIS — G56.01 CARPAL TUNNEL SYNDROME OF RIGHT WRIST: Primary | ICD-10-CM

## 2018-06-29 RX ORDER — LIDOCAINE HYDROCHLORIDE 10 MG/ML
0.5 INJECTION, SOLUTION EPIDURAL; INFILTRATION; INTRACAUDAL; PERINEURAL
Status: DISCONTINUED | OUTPATIENT
Start: 2018-06-29 | End: 2018-08-02

## 2018-06-29 RX ADMIN — LIDOCAINE HYDROCHLORIDE 0.5 ML: 10 INJECTION, SOLUTION EPIDURAL; INFILTRATION; INTRACAUDAL; PERINEURAL at 10:49

## 2018-06-29 NOTE — PROGRESS NOTES
Chief complaint: Bilateral hand paresthesias    History of present illness: Cristy Bailey is a 73-year-old right-hand-dominant female with past medical history including bilateral lower extremity weakness of unknown etiology and hypertension presenting for evaluation of bilateral hand paresthesias, right worse than left.  States this was atraumatic in onset and has been present for approximately 2 months.  She reports that it initially was getting worse but has been stable over the past several weeks.  She denies any pain associated weakness or loss of dexterity.  She does note that she has noticed her writing has been worse but was not sure this was necessarily related.  She denies any paresthesias in her small finger.  Her right-sided paresthesias are present all the time and her left side is only intermittently present and not terribly bothersome for her.  The pain does not wake her up at night.  Of note the patient is dependent upon her wheelchair and uses her upper extremities to push herself out of the chair.    Past medical history:  1.  Leg weakness of unknown etiology  2.  Hypertension    Past surgical history:  section ×2    Medications:  1.  Lisinopril  2.  Gabapentin  3.  Valium as needed    Allergies: No known drug allergies    Family history: Patient denies family history of problems of bleeding clotting or anesthesia.    Social history: The patient lives in [place] in a trailer house with a friend.  She is dependent upon a wheelchair and only stands for transfers.  She is retired from [employer].  She quit smoking 40 years ago.  She denies alcohol or illicit drug use.    Review of systems: 10 point review of systems is performed and negative other than that noted in the HPI.    Physical exam:  General well-nourished no acute distress, pleasant, cooperative with exam. HEENT: Normocephalic atraumatic.  Cardio: Extremities warm and well perfused.  Respiratory: Nonlabored breathing on  room air.  Neuro: Patient moves all extremities spontaneously.  Psych: Appropriate affect.  Skin: No rashes or wounds noted on exposed skin.  Musculoskeletal: Focused examination of bilateral upper extremities reveals radial pulse 2+.  Sensation intact to light touch in the median, radial and ulnar nerve distributions.  The patient notes on the right upper extremity that her median nerve distribution is subjectively decreased with paresthesias as compared to the ulnar and radial distributions.  She notes that it is similar in sensation on the left side as compared to the other distributions.  She has no evidence of thenar atrophy.  She has 4 out of 5 strength with APB on the right, 5 out of 5 strength on the left. 5 out of 5 strength with finger abduction EPL and FPL.  She has full wrist range of motion and finger range of motion bilaterally.  She has a positive Tinel's and Phalen's test bilaterally.  She has a negative Tinel's of her ulnar nerve at the cubital tunnel bilaterally.    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    Assessment and Plan: Cristy is a 73-year-old right-hand-dominant female with past medical history including bilateral lower extremity weakness of unknown etiology and hypertension presenting for evaluation of bilateral hand paresthesias right worse than left consistent with bilateral carpal tunnel syndrome.  We had a long discussion regarding the treatment options today do include observation splinting corticosteroid injection and carpal tunnel release.  Given the short period of her symptoms as well as mild nature on EMG recommend treatment with removable wrist splints and a corticosteroid injection for the right side today.  We did discuss that given the short nature of her symptoms the corticosteroid injection may be curative or even provide her with up to a year of relief.   We did discuss the risk benefits to carpal tunnel release but given the short nature of her symptoms as well as her dependence on her upper extremities when getting out of her wheelchair feel that pursuing nonoperative management at this time is more beneficial.  She will follow-up in 6 weeks for repeat evaluation but we did discuss that should her symptoms completely resolve she can cancel this appointment.  All questions were answered.    The patient was seen and discussed with Dr. Ball who is in agreement with the above assessment and plan.    Anu Lopez MD  Orthopedic surgery resident PGY-4

## 2018-06-29 NOTE — LETTER
2018       RE: Cristy Bailey  [address]  [place] [address]     Dear Colleague,    Thank you for referring your patient, Cristy Bailey, [facility]. Please see a copy of my visit note below.    Chief complaint: Bilateral hand paresthesias    History of present illness: Cristy Bailey is a 73-year-old right-hand-dominant female with past medical history including bilateral lower extremity weakness of unknown etiology and hypertension presenting for evaluation of bilateral hand paresthesias, right worse than left.  States this was atraumatic in onset and has been present for approximately 2 months.  She reports that it initially was getting worse but has been stable over the past several weeks.  She denies any pain associated weakness or loss of dexterity.  She does note that she has noticed her writing has been worse but was not sure this was necessarily related.  She denies any paresthesias in her small finger.  Her right-sided paresthesias are present all the time and her left side is only intermittently present and not terribly bothersome for her.  The pain does not wake her up at night.  Of note the patient is dependent upon her wheelchair and uses her upper extremities to push herself out of the chair.    Past medical history:  1.  Leg weakness of unknown etiology  2.  Hypertension    Past surgical history:  section ×2    Medications:  1.  Lisinopril  2.  Gabapentin  3.  Valium as needed    Allergies: No known drug allergies    Family history: Patient denies family history of problems of bleeding clotting or anesthesia.    Social history: The patient lives in [place] in a trailer house with a friend.  She is dependent upon a wheelchair and only stands for transfers.  She is retired from [employer].  She quit smoking 40 years ago.  She denies alcohol or illicit drug use.    Review of systems: 10  point review of systems is performed and negative other than that noted in the HPI.    Physical exam:  General well-nourished no acute distress, pleasant, cooperative with exam. HEENT: Normocephalic atraumatic.  Cardio: Extremities warm and well perfused.  Respiratory: Nonlabored breathing on room air.  Neuro: Patient moves all extremities spontaneously.  Psych: Appropriate affect.  Skin: No rashes or wounds noted on exposed skin.  Musculoskeletal: Focused examination of bilateral upper extremities reveals radial pulse 2+.  Sensation intact to light touch in the median, radial and ulnar nerve distributions.  The patient notes on the right upper extremity that her median nerve distribution is subjectively decreased with paresthesias as compared to the ulnar and radial distributions.  She notes that it is similar in sensation on the left side as compared to the other distributions.  She has no evidence of thenar atrophy.  She has 4 out of 5 strength with APB on the right, 5 out of 5 strength on the left. 5 out of 5 strength with finger abduction EPL and FPL.  She has full wrist range of motion and finger range of motion bilaterally.  She has a positive Tinel's and Phalen's test bilaterally.  She has a negative Tinel's of her ulnar nerve at the cubital tunnel bilaterally.    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    Assessment and Plan: Cristy is a 73-year-old right-hand-dominant female with past medical history including bilateral lower extremity weakness of unknown etiology and hypertension presenting for evaluation of bilateral hand paresthesias right worse than left consistent with bilateral carpal tunnel syndrome.  We had a long discussion regarding the treatment options today do include observation splinting corticosteroid injection and carpal tunnel release.  Given the short period of her  symptoms as well as mild nature on EMG recommend treatment with removable wrist splints and a corticosteroid injection for the right side today.  We did discuss that given the short nature of her symptoms the corticosteroid injection may be curative or even provide her with up to a year of relief.  We did discuss the risk benefits to carpal tunnel release but given the short nature of her symptoms as well as her dependence on her upper extremities when getting out of her wheelchair feel that pursuing nonoperative management at this time is more beneficial.  She will follow-up in 6 weeks for repeat evaluation but we did discuss that should her symptoms completely resolve she can cancel this appointment.  All questions were answered.    The patient was seen and discussed with Dr. Ball who is in agreement with the above assessment and plan.    Anu Lopez MD  Orthopedic surgery resident PGY-4    Right carpal tunnel injection  Date/Time: 6/29/2018 10:49 AM  Performed by: CRYSTAL BALL  Authorized by: CRYSTAL BALL     Needle Size:  27 G  Medications:  10 mg triamcinolone acetonide 10 MG/ML; 0.5 mL lidocaine (PF) 1 %  Procedure discussed: discussed risks, benefits, and alternatives    Consent Given by:  Patient  Timeout: timeout called immediately prior to procedure    Prep comment:  Patient was prepped and positioned prior to procedure       Again, thank you for allowing me to participate in the care of your patient.      Sincerely,    Crystal Ball MD

## 2018-06-29 NOTE — TELEPHONE ENCOUNTER
Left a voicemail with patient about results that Dr. Palafox sent for me to inform the patient, and if they have any questions to call us back at our clinic number, [phone] option #3.  She has an appt with the ortho today at 1045 at the [facility] (referral), and she has her MRI (7/01/2018) then she has the f/u with Dr. Palafox on 7/20/2018.

## 2018-06-29 NOTE — PROGRESS NOTES
Right carpal tunnel injection  Date/Time: 6/29/2018 10:49 AM  Performed by: CRYSTAL OJEDA  Authorized by: CRYSTAL OJEDA     Needle Size:  27 G  Medications:  10 mg triamcinolone acetonide 10 MG/ML; 0.5 mL lidocaine (PF) 1 %  Procedure discussed: discussed risks, benefits, and alternatives    Consent Given by:  Patient  Timeout: timeout called immediately prior to procedure    Prep comment:  Patient was prepped and positioned prior to procedure         Patient was seen and examined with the resident.  I agree with the assessment and plan of care.

## 2018-06-29 NOTE — TELEPHONE ENCOUNTER
----- Message from Johnnie Palafox MD sent at 6/28/2018  5:06 PM CDT -----  Tell her she does have cts[does she have ortho hand consult yet?] Rest of emg doesn't explain proximal leg sx's; needs mri's that I ordered

## 2018-06-29 NOTE — MR AVS SNAPSHOT
After Visit Summary   6/29/2018    Cristy Bailey    MRN: [id number]           Patient Information     Date Of Birth          1945        Visit Information        Provider Department      6/29/2018 10:40 AM Bry Ball MD [facility]        Today's Diagnoses     Carpal tunnel syndrome of right wrist    -  1       Follow-ups after your visit        Your next 10 appointments already scheduled     Jul 01, 2018 10:45 AM CDT   MR THORACIC SPINE W/O & W CONTRAST with [id number]   [facility] [place] [facility] ([facility])    [address] [place] [address] [phone]           Take your medicines as usual, unless your doctor tells you not to. Bring a list of your current medicines to your exam (including vitamins, minerals and over-the-counter drugs).  You may or may not receive intravenous (IV) contrast for this exam pending the discretion of the Radiologist.  You do not need to do anything special to prepare.  The MRI machine uses a strong magnet. Please wear clothes without metal (snaps, zippers). A sweatsuit works well, or we may give you a hospital gown.  Please remove any body piercings and hair extensions before you arrive. You will also remove watches, jewelry, hairpins, wallets, dentures, partial dental plates and hearing aids. You may wear contact lenses, and you may be able to wear your rings. We have a safe place to keep your personal items, but it is safer to leave them at home.  **IMPORTANT** THE INSTRUCTIONS BELOW ARE ONLY FOR THOSE PATIENTS WHO HAVE BEEN PRESCRIBED SEDATION OR GENERAL ANESTHESIA DURING THEIR MRI PROCEDURE:  IF YOUR DOCTOR PRESCRIBED ORAL SEDATION (take medicine to help you relax during your exam):   You must get the medicine from your doctor (oral medication) before you arrive. Bring the medicine to the exam. Do not take it at home. You ll be told when to take it upon arriving for your exam.    Arrive one hour early. Bring someone who can take you home after the test. Your medicine will make you sleepy. After the exam, you may not drive, take a bus or take a taxi by yourself.  IF YOUR DOCTOR PRESCRIBED IV SEDATION:   Arrive one hour early. Bring someone who can take you home after the test. Your medicine will make you sleepy. After the exam, you may not drive, take a bus or take a taxi by yourself.   No eating 6 hours before your exam. You may have clear liquids up until 4 hours before your exam. (Clear liquids include water, clear tea, black coffee and fruit juice without pulp.)  IF YOUR DOCTOR PRESCRIBED ANESTHESIA (be asleep for your exam):   Arrive 1 1/2 hours early. Bring someone who can take you home after the test. You may not drive, take a bus or take a taxi by yourself.   No eating 8 hours before your exam. You may have clear liquids up until 4 hours before your exam. (Clear liquids include water, clear tea, black coffee and fruit juice without pulp.)   You will spend four to five hours in the recovery room.  Please call the Imaging Department at your exam site with any questions.            Jul 01, 2018 11:30 AM CDT   MR HIP BILATERAL with [id number]   [facility] [place] [facility] ([facility])    [address] [address] [phone]           Take your medicines as usual, unless your doctor tells you not to. Bring a list of your current medicines to your exam (including vitamins, minerals and over-the-counter drugs). Also bring the results of similar scans you may have had.  Please remove any body piercings and hair extensions before you arrive.  Follow your doctor s orders. If you do not, we may have to postpone your exam.  You may or may not receive IV contrast for this exam pending the discretion of the Radiologist.  You do not need to do anything special to prepare.  The MRI machine uses a strong magnet. Please wear clothes without  metal (snaps, zippers). A sweatsuit works well, or we may give you a hospital gown.   **IMPORTANT** THE INSTRUCTIONS BELOW ARE ONLY FOR THOSE PATIENTS WHO HAVE BEEN PRESCRIBED SEDATION OR GENERAL ANESTHESIA DURING THEIR MRI PROCEDURE:  IF YOUR DOCTOR PRESCRIBED ORAL SEDATION (take medicine to help you relax during your exam):   You must get the medicine from your doctor (oral medication) before you arrive. Bring the medicine to the exam. Do not take it at home. You ll be told when to take it upon arriving for your exam.   Arrive one hour early. Bring someone who can take you home after the test. Your medicine will make you sleepy. After the exam, you may not drive, take a bus or take a taxi by yourself.  IF YOUR DOCTOR PRESCRIBED IV SEDATION:   Arrive one hour early. Bring someone who can take you home after the test. Your medicine will make you sleepy. After the exam, you may not drive, take a bus or take a taxi by yourself.   No eating 6 hours before your exam. You may have clear liquids up until 4 hours before your exam. (Clear liquids include water, clear tea, black coffee and fruit juice without pulp.)  IF YOUR DOCTOR PRESCRIBED ANESTHESIA (be asleep for your exam):   Arrive 1 1/2 hours early. Bring someone who can take you home after the test. You may not drive, take a bus or take a taxi by yourself.   No eating 8 hours before your exam. You may have clear liquids up until 4 hours before your exam. (Clear liquids include water, clear tea, black coffee and fruit juice without pulp.)   You will spend four to five hours in the recovery room.  Please call the Imaging Department at your exam site with any questions.            Jul 20, 2018 10:00 AM CDT   (Arrive by 9:45 AM)   Return Visit with Johnnie Palafox MD   [facility] ([facility] [place])    [address] [address] [phone]              Future tests that were ordered for you today      "Open Future Orders        Priority Expected Expires Ordered    NCS Motor with or without F-Wave, 11-12 Nerves Routine  6/28/2019 6/28/2018    Needle EMG Each Extremity w/Paraspinal Area Complete (79498) Routine  6/28/2019 6/28/2018    Needle EMG Each Extremity w/Paraspinal Area Limited (78069) Routine  6/28/2019 6/28/2018            Who to contact     Please call your clinic at [phone] to:    Ask questions about your health    Make or cancel appointments    Discuss your medicines    Learn about your test results    Speak to your doctor            Additional Information About Your Visit        [employer] Information     "[employer]" gives you secure access to your electronic health record. If you see a primary care provider, you can also send messages to your care team and make appointments. If you have questions, please call your primary care clinic.  If you do not have a primary care provider, please call [phone] and they will assist you.      "[employer]" is an electronic gateway that provides easy, online access to your medical records. With "[employer]", you can request a clinic appointment, read your test results, renew a prescription or communicate with your care team.     To access your existing account, please contact your [facility] or call [phone] for assistance.        Care EveryWhere ID     This is your Care EveryWhere ID. This could be used by other organizations to access your [place] medical records  [id number]        Your Vitals Were     Height BMI (Body Mass Index)                1.676 m (5' 6\") 41.16 kg/m2           Blood Pressure from Last 3 Encounters:   06/21/18 132/86   06/20/18 141/87   06/08/18 144/82    Weight from Last 3 Encounters:   06/29/18 115.7 kg (255 lb)   05/24/18 119.3 kg (263 lb)   05/10/18 119.3 kg (263 lb)              We Performed the Following     Hand / Upper Extremity Injection/Arthrocentesis        Primary Care Provider Office Phone # Fax #    " Manoj Daley PA-C [phone]       [address] [place] [address]        Goals        General    Pain Management (pt-stated)     Notes - Note created  4/5/2018  2:53 PM by Francisca Hernandez LSW    Goal Statement: I will have less pain.  Measure of Success: Will have intervention that addresses my pain.   Supportive Steps to Achieve: CC will contact PCP to discuss patient concerns and next steps.   Barriers: Cannot leave the house without extreme effort. Is having extreme pain and does not have any sense that it will get better. Her  is her caregiver and he is frustrated with her pain.  Strengths: Has  who supports her.    Date to Achieve By: 9-1-2018        Equal Access to Services     MICHELLE WISE : Janet Martin, watobin esposito, qaybta kaalmacarrie mendez, larry camp. So [facility] [phone].    ATENCIÓN: Si habla español, tiene a pompa disposición servicios gratuitos de asistencia lingüística. Llame al [phone].    We comply with applicable federal civil rights laws and [place] laws. We do not discriminate on the basis of race, color, national origin, age, disability, sex, sexual orientation, or gender identity.            Thank you!     Thank you for choosing [facility]  for your care. Our goal is always to provide you with excellent care. Hearing back from our patients is one way we can continue to improve our services. Please take a few minutes to complete the written survey that you may receive in the mail after your visit with us. Thank you!             Your Updated Medication List - Protect others around you: Learn how to safely use, store and throw away your medicines at www.disposemymeds.org.          This list is accurate as of 6/29/18 11:33 AM.  Always use your most recent med list.                   Brand Name Dispense Instructions for use Diagnosis    * diazepam 10 MG tablet    VALIUM    1 tablet     Take 30 min before mri    Leg weakness, bilateral       * diazepam 10 MG tablet    VALIUM    1 tablet    Take 1 tablet (10 mg) by mouth every 6 hours as needed for anxiety or sleep Take 30-60 minutes before procedure.  Do not operate a vehicle after taking this medication.    Weakness of both lower extremities       gabapentin 300 MG capsule    NEURONTIN    180 capsule    Take 1 capsule (300 mg) by mouth 2 times daily    Weakness of both lower extremities, Bilateral leg pain       HYDROcodone-acetaminophen 5-325 MG per tablet    NORCO    30 tablet    Take 1 tablet by mouth At Bedtime    Bilateral leg pain       lisinopril 5 MG tablet    PRINIVIL/ZESTRIL    90 tablet    Take 1 tablet (5 mg) by mouth daily    HTN, goal below 140/90       order for DME     2 Package    Equipment being ordered: Compression stockings 20-30mmHg    Lower extremity edema       * Notice:  This list has 2 medication(s) that are the same as other medications prescribed for you. Read the directions carefully, and ask your doctor or other care provider to review them with you.

## 2018-06-29 NOTE — NURSING NOTE
"Reason For Visit:   Chief Complaint   Patient presents with     Consult     Carpal tunnel syndrome. Pt stated that her R>L.      Results     EMG on 6-       Primary MD: Manoj Daley  Referring:   MEREDITH BAUTISTA    Age: 73 year old    ?  No    Height 1.676 m (5' 6\"), weight 115.7 kg (255 lb).        Pain Assessment  Patient Currently in Pain: Yes  Pain Orientation: Right  Pain Descriptors: Numbness, Tingling (Pt stated that it is not pain but it bothers her. )    Hand Dominance Evaluation  Hand Dominance: Right          QuickDASH Assessment  QuickDASH Main 6/29/2018   1.Open a tight or new jar. Mild difficulty   2. Do heavy household chores (e.g., wash walls, floors) Severe difficulty   3. Carry a shopping bag or briefcase. Mild difficulty   4. Wash your back. Moderate difficulty   5. Use a knife to cut food. No difficulty   6. Recreational activities in which you take some force or impact through your arm, shoulder or hand (e.g., golf, hammering, tennis, etc.). Mild difficulty   7. During the past week, to what extent has your arm, shoulder or hand problem interfered with your normal social activities with family, friends, neighbours or groups? Slightly   8. During the past week, were you limited in your work or other regular daily activities as a result of your arm, shoulder or hand problem? Slightly limited   9. Arm, shoulder or hand pain. Moderate   10.Tingling (pins and needles) in your arm,shoulder or hand. Mild   11. During the past week, how much difficulty have you had sleeping because of the pain in your arm, shoulder or hand? ([place] number) Moderate difficulty   Quickdash Ability Score 34.09          Current Outpatient Prescriptions   Medication Sig Dispense Refill     diazepam (VALIUM) 10 MG tablet Take 1 tablet (10 mg) by mouth every 6 hours as needed for anxiety or sleep Take 30-60 minutes before procedure.  Do not operate a vehicle after taking this medication. 1 " tablet 0     diazepam (VALIUM) 10 MG tablet Take 30 min before mri 1 tablet 0     gabapentin (NEURONTIN) 300 MG capsule Take 1 capsule (300 mg) by mouth 2 times daily 180 capsule 0     HYDROcodone-acetaminophen (NORCO) 5-325 MG per tablet Take 1 tablet by mouth At Bedtime 30 tablet 0     lisinopril (PRINIVIL/ZESTRIL) 5 MG tablet Take 1 tablet (5 mg) by mouth daily 90 tablet 1     order for DME Equipment being ordered: Compression stockings 20-30mmHg 2 Package 0       Allergies   Allergen Reactions     No Known Drug Allergies        Melissa Bernstein LPN

## 2018-07-01 ENCOUNTER — RADIANT APPOINTMENT (OUTPATIENT)
Dept: MRI IMAGING | Facility: CLINIC | Age: 73
End: 2018-07-01
Attending: PSYCHIATRY & NEUROLOGY
Payer: COMMERCIAL

## 2018-07-01 DIAGNOSIS — R29.898 LEG WEAKNESS, BILATERAL: ICD-10-CM

## 2018-07-01 DIAGNOSIS — M16.10 HIP ARTHRITIS: ICD-10-CM

## 2018-07-01 RX ORDER — GADOBUTROL 604.72 MG/ML
10 INJECTION INTRAVENOUS ONCE
Status: COMPLETED | OUTPATIENT
Start: 2018-07-01 | End: 2018-07-01

## 2018-07-01 RX ADMIN — GADOBUTROL 10 ML: 604.72 INJECTION INTRAVENOUS at 10:49

## 2018-07-02 ENCOUNTER — TELEPHONE (OUTPATIENT)
Dept: ORTHOPEDICS | Facility: CLINIC | Age: 73
End: 2018-07-02

## 2018-07-02 NOTE — TELEPHONE ENCOUNTER
Orthopedics: Call Back    Cristy was called back. She informs me that she was given two LEFT off the shelf DME wrist braces, when she needed a right and a left. The right hand box was labelled incorrectly. Patient is unable to drive herself to the [facility] to come exchange DME. The [facility] office at [address] [address]  was called, and they agreed to exchange the brace for her. This is more convenient for the patient. Patient was given this author's name to reach out to if she continues to have problems.

## 2018-07-02 NOTE — TELEPHONE ENCOUNTER
[facility] Call Center    Phone Message    May a detailed message be left on voicemail: yes    Reason for Call: Other: Cristy was in Dr. Ball's clinic on Friday, June 29.  She received hand bands for her carpal tunnel.  Both boxes of hand bands are for the left hand.  Cristy would like to exchange one box for the right hand.  She also indicates that the nurse that assisted her did not know how to use the bands and could not properly instruct her.  Please give Cristy an call to discuss getting the correct bands.     Action Taken: Message routed to:  [facility] ([facility] ump ortho

## 2018-07-05 NOTE — TELEPHONE ENCOUNTER
Message  Received: Today       Johnnie Palafox MD Burguet, Kailee, LPN                     Tell her mri showed severe rt hip dx[I would consider replacement]       Left a voicemail with patient about results that Dr. Palafox sent for me to inform the patient, and if they have any questions to call us back at our clinic number, [phone] option #3.

## 2018-07-05 NOTE — TELEPHONE ENCOUNTER
----- Message from Johnnie Palafox MD sent at 7/5/2018  9:15 AM CDT -----  Tell her it's in both hips[osteoarthritis]

## 2018-07-06 ENCOUNTER — PATIENT OUTREACH (OUTPATIENT)
Dept: CARE COORDINATION | Facility: CLINIC | Age: 73
End: 2018-07-06

## 2018-07-06 ASSESSMENT — ACTIVITIES OF DAILY LIVING (ADL): DEPENDENT_IADLS:: COOKING;CLEANING;LAUNDRY;SHOPPING;MEAL PREPARATION;TRANSPORTATION

## 2018-07-06 NOTE — PROGRESS NOTES
Clinic Care Coordination Contact    Clinic Care Coordination Contact  OUTREACH    Referral Information:  Referral Source: Home Care    Primary Diagnosis: Chronic Pain    Chief Complaint   Patient presents with     Clinic Care Coordination - Follow-up        Universal Utilization: Has been attending her appointments and not using ED recently for leg pain.    Clinic Utilization  Difficulty keeping appointments:: No  Utilization    Last refreshed: 7/5/2018 11:41 AM:  No Show Count (past year) 1       Last refreshed: 7/5/2018 11:41 AM:  ED visits 2       Last refreshed: 7/5/2018 11:41 AM:  Hospital admissions 0          Current as of: 7/5/2018 11:41 AM           Clinical Concerns:  Current Medical Concerns:  Got injections for carpal tunnel in both wrists and was given wrist bands to wear. She originally got two left bands and needed to exchange them for one for each hand.  Her hands are not tingling as they were, but are numb.  She hopes this resolves with time.  Had the MRI of her back and hips and needs a hip replacement for her right hip.  She has appointment on July 20th with neurologist. Discussed where she wants to have the surgery and she likes the [facility].  She will work with neurologist there for a referral to an orthopedic surgeon.      Current Behavioral Concerns: She is trying to be patient and roll with mistakes and not get angry.  Was calm during the call.      Education Provided to patient: Discussed how to navigate the medical referrals and when to involve PCP.  She understands that she will require a pre op physical.     Pain  Chronic pain (GOAL):: Yes  Location of chronic pain:: legs , hips and lower extremities. Gets worse as it goes down   Chronic pain timing:: Constant  Limitation of routine activities due to chronic pain:: Yes  Health Maintenance Reviewed: Due/Overdue   Health Maintenance Due   Topic Date Due     HEPATITIS C SCREENING  05/30/1963     PNEUMOCOCCAL (1 of 2 - PCV13) 05/30/2012      MAMMO SCREEN Q2 YR (SYSTEM ASSIGNED)  08/01/2013     ADVANCE DIRECTIVE PLANNING Q5 YRS  08/02/2016     Clinical Pathway: None    Medication Management:  No concerns.      Functional Status:  Dependent ADLs:: Wheelchair-independent  Dependent IADLs:: Cooking, Cleaning, Laundry, Shopping, Meal Preparation, Transportation  Bed or wheelchair confined:: No  Mobility Status: Independent w/Device  Fallen 2 or more times in the past year?: No  Any fall with injury in the past year?: No    Living Situation:  Type of residence:: Private home - no stairs    Diet/Exercise/Sleep:  Diet:: Regular  Inadequate nutrition (GOAL):: No  Exercise:: Unable to exercise  Significant changes in sleep pattern (GOAL): No    Transportation:  Transportation concerns (GOAL):: No  Transportation means:: Regular car, Family     Psychosocial:  Yazidi or spiritual beliefs that impact treatment:: No  Mental health DX:: Yes  Mental health DX how managed:: Medication  Mental health management concern (GOAL):: Yes  Informal Support system:: Family, Spouse     Financial/Insurance:   Financial/Insurance concerns (GOAL):: No  [facility].     Resources and Interventions:  Current Resources:    She did talk to her [facility] CM about getting a wheelchair to use and was told to call [facility]. They told her that they did not have a wheelchair that fits her.  She got information from  about PCA options and how she could hire [employer]. They decided to not pursue the program as she would have to pay a co pay per month of $300 and it was too much work. Too many steps and too many people would have to be involved.  At this time, they are doing well and don't want others involved.  She understands that if she changes her mind, she can apply again for in home assistance.          Equipment Currently Used at Home: walker, standard, wheelchair, manual, other (see comments) (wrist bands for both hands)  She got a letter from [facility] stating that they  approved the wheelchair.  She wanted the name and number of the [employer] contact who is working on the order. Patient will call her on Monday to find out what the timeline is now.      Advance Care Plan/Directive  Advanced Care Plans/Directives on file:: No     Goals:   Goals        General    Pain Management (pt-stated)     Notes - Note created  4/5/2018  2:53 PM by Francisca Hernandez LSW    Goal Statement: I will have less pain.  Measure of Success: Will have intervention that addresses my pain.   Supportive Steps to Achieve: CC will contact PCP to discuss patient concerns and next steps.   Barriers: Cannot leave the house without extreme effort. Is having extreme pain and does not have any sense that it will get better. Her  is her caregiver and he is frustrated with her pain.  Strengths: Has  who supports her.    Date to Achieve By: 9-1-2018            Patient/Caregiver understanding: Patient will attend her appointments and will call [employer] to learn how much longer it will be before she gets a wheelchair.     Outreach Frequency: 2 weeks  Future Appointments              In 2 weeks Johnnie Palafox MD [facility], [facility]    In 1 month Bry Ball MD [facility], [facility]          Plan: CC to call after July 20th to discuss possible hip replacement surgery.  Patient to call CC if needs arise prior to then.     Francisca Hernandez,   [facility]  [email or web address]  [phone]

## 2018-07-17 ENCOUNTER — TELEPHONE (OUTPATIENT)
Dept: FAMILY MEDICINE | Facility: CLINIC | Age: 73
End: 2018-07-17

## 2018-07-17 NOTE — TELEPHONE ENCOUNTER
Type of outreach:  None  Health Maintenance Due   Topic Date Due     HEPATITIS C SCREENING  05/30/1963     PNEUMOCOCCAL (1 of 2 - PCV13) 05/30/2012     MAMMO SCREEN Q2 YR (SYSTEM ASSIGNED)  08/01/2013     ADVANCE DIRECTIVE PLANNING Q5 YRS  08/02/2016     Discussed mammogram at most recent OV.  Kiera Appiah MA

## 2018-07-20 ENCOUNTER — OFFICE VISIT (OUTPATIENT)
Dept: NEUROLOGY | Facility: CLINIC | Age: 73
End: 2018-07-20
Payer: COMMERCIAL

## 2018-07-20 VITALS — HEIGHT: 66 IN | HEART RATE: 69 BPM | SYSTOLIC BLOOD PRESSURE: 141 MMHG | DIASTOLIC BLOOD PRESSURE: 62 MMHG

## 2018-07-20 DIAGNOSIS — R29.898 LEG WEAKNESS, BILATERAL: ICD-10-CM

## 2018-07-20 DIAGNOSIS — E04.2 NON-TOXIC MULTINODULAR GOITER: Primary | ICD-10-CM

## 2018-07-20 DIAGNOSIS — M16.10 ARTHRITIS OF HIP: ICD-10-CM

## 2018-07-20 ASSESSMENT — ENCOUNTER SYMPTOMS
SYNCOPE: 0
DEPRESSION: 1
DECREASED CONCENTRATION: 1
MUSCLE WEAKNESS: 1
STIFFNESS: 0
INSOMNIA: 0
JOINT SWELLING: 0
LEG PAIN: 1
HYPOTENSION: 0
ORTHOPNEA: 0
NERVOUS/ANXIOUS: 1
LIGHT-HEADEDNESS: 0
EXERCISE INTOLERANCE: 1
BACK PAIN: 1
MUSCLE CRAMPS: 1
HYPERTENSION: 0
PANIC: 1
PALPITATIONS: 0
SLEEP DISTURBANCES DUE TO BREATHING: 0
ARTHRALGIAS: 0
NECK PAIN: 0
MYALGIAS: 1

## 2018-07-20 ASSESSMENT — PAIN SCALES - GENERAL: PAINLEVEL: MODERATE PAIN (4)

## 2018-07-20 NOTE — LETTER
2018       RE: Cristy Bailey  [address]  [place] [address]     Dear Colleague,    Thank you for referring your patient, Cristy Bailey, to the [facility] NEUROLOGY at [facility]. Please see a copy of my visit note below.    Service Date: 2018      Manoj Daley PA-C   [facility]    [address] [place] [address]      RE: Cristy Bailey   MRN: [id number]   : 1945      Dear Dr. Daley:      This in regard to followup on Cristy Bailey.  The patient returned today with a chief complaint of bilateral leg pain, proximal leg weakness, as well as right hand paresthesias and discomfort.      The patient did undergo further testing at my suggestion.  She had MRI scan testing of her thoracic spine which was fortunately basically unremarkable and showed no evidence of spinal cord compression or other significant issues.  I went over the films with her and her partner, Leo Akbar.  She did have on EMG testing abnormalities noted with findings of bilateral carpal tunnel syndrome, worse on the right than the left.  She also had evidence of a mild chronic right C7 radiculopathy and mild bilateral L5-S1 radiculopathies with no evidence of a myopathy or more proximal multilevel lumbar radiculopathy or plexopathy.  The patient has been to Dr. Ball, our hand orthopedic surgeon.  She has been treated with cortisone injection, which helped relieve some pain but not her paresthesias and her pain is returning.  She has been using a wrist splint.  Her most pressing problem though is her severe proximal pain in her legs, especially the right.  The previous steroid injection that you had given her seems to be wearing off and she has now decided that she wants to go ahead with hip replacement.  I went over the MRI scans that I ordered of both hips and it does show changes consistent with severe osteoarthritis of the hips and what  appears to me to be probable osteonecrosis of the right hip.  The patient did have disuse atrophy noted on the MRI scan of more proximal muscles around the hip.      The patient did review with me her laboratory tests available through the Epic website.      The patient does have significant problems with proximal leg weakness.  This probably relates to the severe pain she has had.  I did discuss with her the possibility of having Physiatry consultation before hip replacement and then to continue it afterwards.  She will probably need a long rehabilitation program.  I anticipate that she will end up having bilateral hip replacements with Dr. Garcia.  I told her that I would be happy to see her in followup and on a p.r.n. basis if she desired it.  She was encouraged to have followup now with Dr. Ball to consider a right carpel tunnel release.      I went over that with her and her partner prior cervical and lumbar MRI scans.  She does have evidence of spondylosis with mild spurring with foraminal narrowing, probably responsible for the other EMG findings to suggest mild radiculopathies.  I do not think that these are significant for her current pain issues.      I did demonstrate to the patient that she had normal strength in her limbs except for moderately severe right thenar weakness with some evidence of atrophy and also continued problems with proximal leg weakness which relates to severe pain inhibition from her hip disease.      Thank you for allowing me to see this patient.       Sincerely yours,       Johnnie Palafox MD      I spent 25 minutes with her and her partner.  Over 50% of the time this involved counseling and coordination of care.      cc:   Bry Garcia MD   [facility]    [address]   [place] [address]       D: 2018   T: 2018   MT: AKA      Name:     LUCIANO JONES   MRN:      [id number]        Account:      [id number]   :       1945           Service Date: 07/20/2018      Document: [id number]

## 2018-07-20 NOTE — MR AVS SNAPSHOT
After Visit Summary   7/20/2018    Cristy Bailey    MRN: [id number]           Patient Information     Date Of Birth          1945        Visit Information        Provider Department      7/20/2018 10:00 AM Johnnie Palafox MD [facility] Neurology        Today's Diagnoses     Non-toxic multinodular goiter    -  1    Leg weakness, bilateral        Arthritis of hip           Follow-ups after your visit        Additional Services     ENDOCRINOLOGY ADULT REFERRAL       Your provider has referred you to: [facility]: Endocrinology and Diabetes Clinic [facility] [phone]   [email or web address]      Please be aware that coverage of these services is subject to the terms and limitations of your health insurance plan.  Call member services at your health plan with any benefit or coverage questions.      Please bring the following to your appointment:    >>   Any x-rays, CTs or MRIs which have been performed.  Contact the facility where they were done to arrange for  prior to your scheduled appointment.    >>   List of current medications   >>   This referral request   >>   Any documents/labs given to you for this referral            PHYSIATRY REFERRAL       Your provider has referred you to: [facility]: Physical Medicine and Rehabilitation [facility] [phone]   [email or web address]  EVAL PRE AND POST OP EVAL FOR SEVERE HIP DX Salvador King md    Please note these locations do not prescribe narcotics or manage chronic pain.    Please be aware that coverage of these services is subject to the terms and limitations of your health insurance plan.  Call member services at your health plan with any benefit or coverage questions.      Please bring the following to your appointment:  >>   Any x-rays, CTs or MRIs which have been performed.  Contact the facility where they were done to arrange for  prior to your scheduled appointment.    >>  "  List of current medications   >>   This referral request   >>   Any documents/labs given to you for this referral                  Your next 10 appointments already scheduled     Aug 10, 2018 10:00 AM CDT   (Arrive by 9:45 AM)   RETURN HAND with Bry Ball MD   [facility] ([facility] [place])    [address] [id number] [phone]              Who to contact     Please call your clinic at [phone] to:    Ask questions about your health    Make or cancel appointments    Discuss your medicines    Learn about your test results    Speak to your doctor            Additional Information About Your Visit        [employer] Information     [email or web address] gives you secure access to your electronic health record. If you see a primary care provider, you can also send messages to your care team and make appointments. If you have questions, please call your primary care clinic.  If you do not have a primary care provider, please call [phone] and they will assist you.      [email or web address] is an electronic gateway that provides easy, online access to your medical records. With [email or web address], you can request a clinic appointment, read your test results, renew a prescription or communicate with your care team.     To access your existing account, please contact your [facility] or call [phone] for assistance.        Care EveryWhere ID     This is your Care EveryWhere ID. This could be used by other organizations to access your [place] medical records  [id number]        Your Vitals Were     Pulse Height                69 1.676 m (5' 6\")           Blood Pressure from Last 3 Encounters:   07/20/18 141/62   06/21/18 132/86   06/20/18 141/87    Weight from Last 3 Encounters:   06/29/18 115.7 kg (255 lb)   05/24/18 119.3 kg (263 lb)   05/10/18 119.3 kg (263 lb)              We Performed the Following     ENDOCRINOLOGY ADULT REFERRAL  "    PHYSIATRY REFERRAL        Primary Care Provider Office Phone # Fax #    Manoj Robert Daley PA-C [phone]       [address] [address] [address]        Goals        General    Pain Management (pt-stated)     Notes - Note created  4/5/2018  2:53 PM by Francisca Hernandez LSW    Goal Statement: I will have less pain.  Measure of Success: Will have intervention that addresses my pain.   Supportive Steps to Achieve: CC will contact PCP to discuss patient concerns and next steps.   Barriers: Cannot leave the house without extreme effort. Is having extreme pain and does not have any sense that it will get better. Her  is her caregiver and he is frustrated with her pain.  Strengths: Has  who supports her.    Date to Achieve By: 9-1-2018        Equal Access to Services     MICHELLE WISE : Janet Martin, watobin esposito, qatonya kaalpeewee mendez, larry camp. So [facility] [phone].    ATENCIÓN: Si habla español, tiene a pompa disposición servicios gratuitos de asistencia lingüística. Llame al [phone].    We comply with applicable federal civil rights laws and [place] laws. We do not discriminate on the basis of race, color, national origin, age, disability, sex, sexual orientation, or gender identity.            Thank you!     Thank you for choosing [facility] NEUROLOGY  for your care. Our goal is always to provide you with excellent care. Hearing back from our patients is one way we can continue to improve our services. Please take a few minutes to complete the written survey that you may receive in the mail after your visit with us. Thank you!             Your Updated Medication List - Protect others around you: Learn how to safely use, store and throw away your medicines at www.disposemymeds.org.          This list is accurate as of 7/20/18 11:11 AM.  Always use your most recent med list.                   Brand Name Dispense Instructions  for use Diagnosis    * diazepam 10 MG tablet    VALIUM    1 tablet    Take 30 min before mri    Leg weakness, bilateral       * diazepam 10 MG tablet    VALIUM    1 tablet    Take 1 tablet (10 mg) by mouth every 6 hours as needed for anxiety or sleep Take 30-60 minutes before procedure.  Do not operate a vehicle after taking this medication.    Weakness of both lower extremities       gabapentin 300 MG capsule    NEURONTIN    180 capsule    Take 1 capsule (300 mg) by mouth 2 times daily    Weakness of both lower extremities, Bilateral leg pain       HYDROcodone-acetaminophen 5-325 MG per tablet    NORCO    30 tablet    Take 1 tablet by mouth At Bedtime    Bilateral leg pain       lisinopril 5 MG tablet    PRINIVIL/ZESTRIL    90 tablet    Take 1 tablet (5 mg) by mouth daily    HTN, goal below 140/90       order for DME     2 Package    Equipment being ordered: Compression stockings 20-30mmHg    Lower extremity edema       * Notice:  This list has 2 medication(s) that are the same as other medications prescribed for you. Read the directions carefully, and ask your doctor or other care provider to review them with you.

## 2018-07-21 ENCOUNTER — MYC MEDICAL ADVICE (OUTPATIENT)
Dept: FAMILY MEDICINE | Facility: CLINIC | Age: 73
End: 2018-07-21

## 2018-07-23 NOTE — PROGRESS NOTES
Service Date: 2018      Manoj Daley PA-C   [facility]    [address] [place] [address]      RE: Cristy Bailey   MRN: [id number]   : 1945      Dear Dr. Daley:      This in regard to followup on Cristy Bailey.  The patient returned today with a chief complaint of bilateral leg pain, proximal leg weakness, as well as right hand paresthesias and discomfort.      The patient did undergo further testing at my suggestion.  She had MRI scan testing of her thoracic spine which was fortunately basically unremarkable and showed no evidence of spinal cord compression or other significant issues.  I went over the films with her and her partner, Leo Akbar.  She did have on EMG testing abnormalities noted with findings of bilateral carpal tunnel syndrome, worse on the right than the left.  She also had evidence of a mild chronic right C7 radiculopathy and mild bilateral L5-S1 radiculopathies with no evidence of a myopathy or more proximal multilevel lumbar radiculopathy or plexopathy.  The patient has been to Dr. Ball, our hand orthopedic surgeon.  She has been treated with cortisone injection, which helped relieve some pain but not her paresthesias and her pain is returning.  She has been using a wrist splint.  Her most pressing problem though is her severe proximal pain in her legs, especially the right.  The previous steroid injection that you had given her seems to be wearing off and she has now decided that she wants to go ahead with hip replacement.  I went over the MRI scans that I ordered of both hips and it does show changes consistent with severe osteoarthritis of the hips and what appears to me to be probable osteonecrosis of the right hip.  The patient did have disuse atrophy noted on the MRI scan of more proximal muscles around the hip.      The patient did review with me her laboratory tests available through the Epic website.      The patient does have  significant problems with proximal leg weakness.  This probably relates to the severe pain she has had.  I did discuss with her the possibility of having Physiatry consultation before hip replacement and then to continue it afterwards.  She will probably need a long rehabilitation program.  I anticipate that she will end up having bilateral hip replacements with Dr. Garcia.  I told her that I would be happy to see her in followup and on a p.r.n. basis if she desired it.  She was encouraged to have followup now with Dr. Ball to consider a right carpel tunnel release.      I went over that with her and her partner prior cervical and lumbar MRI scans.  She does have evidence of spondylosis with mild spurring with foraminal narrowing, probably responsible for the other EMG findings to suggest mild radiculopathies.  I do not think that these are significant for her current pain issues.      I did demonstrate to the patient that she had normal strength in her limbs except for moderately severe right thenar weakness with some evidence of atrophy and also continued problems with proximal leg weakness which relates to severe pain inhibition from her hip disease.      Thank you for allowing me to see this patient.       Sincerely yours,       Meredith Palafox MD      I spent 25 minutes with her and her partner.  Over 50% of the time this involved counseling and coordination of care.      cc:   Bry Garcia MD   [facility]    [address]   [place] [address]         MEREDITH PALAFOX MD             D: 2018   T: 2018   MT: AKA      Name:     LUCIANO JONES   MRN:      [id number]        Account:      [id number]   :      1945           Service Date: 2018      Document: [id number]

## 2018-07-26 ENCOUNTER — OFFICE VISIT (OUTPATIENT)
Dept: ORTHOPEDICS | Facility: CLINIC | Age: 73
End: 2018-07-26
Payer: COMMERCIAL

## 2018-07-26 ENCOUNTER — TELEPHONE (OUTPATIENT)
Dept: ORTHOPEDICS | Facility: CLINIC | Age: 73
End: 2018-07-26

## 2018-07-26 ENCOUNTER — PATIENT OUTREACH (OUTPATIENT)
Dept: CARE COORDINATION | Facility: CLINIC | Age: 73
End: 2018-07-26

## 2018-07-26 VITALS — BODY MASS INDEX: 41.16 KG/M2 | DIASTOLIC BLOOD PRESSURE: 86 MMHG | WEIGHT: 255 LBS | SYSTOLIC BLOOD PRESSURE: 142 MMHG

## 2018-07-26 DIAGNOSIS — M87.051 AVASCULAR NECROSIS OF BONE OF RIGHT HIP (H): Primary | ICD-10-CM

## 2018-07-26 PROCEDURE — 99213 OFFICE O/P EST LOW 20 MIN: CPT | Performed by: ORTHOPAEDIC SURGERY

## 2018-07-26 ASSESSMENT — ACTIVITIES OF DAILY LIVING (ADL): DEPENDENT_IADLS:: COOKING;CLEANING;LAUNDRY;SHOPPING;MEAL PREPARATION;TRANSPORTATION

## 2018-07-26 NOTE — PROGRESS NOTES
Clinic Care Coordination Contact    Clinic Care Coordination Contact  OUTREACH    Referral Information:    Primary Diagnosis: Chronic Pain    Chief Complaint   Patient presents with     Clinic Care Coordination - Follow-up        Universal Utilization: Appropriate utilization.   Clinic Utilization  Difficulty keeping appointments:: No  Utilization    Last refreshed: 7/23/2018 11:48 AM:  No Show Count (past year) 1       Last refreshed: 7/23/2018 11:48 AM:  ED visits 2       Last refreshed: 7/23/2018 11:48 AM:  Hospital admissions 0          Current as of: 7/23/2018 11:48 AM           Clinical Concerns:  Current Medical Concerns:  Meeting with surgeon this afternoon to discuss right hip replacement.      Current Behavioral Concerns: No new concerns.  Anxious to have surgery scheduled.      Education Provided to patient: None provided.    Pain  Chronic pain (GOAL):: Yes  Location of chronic pain:: legs , hips and lower extremities. Gets worse as it goes down   Chronic pain timing:: Constant  Limitation of routine activities due to chronic pain:: Yes  Health Maintenance Reviewed: Due/Overdue   Health Maintenance Due   Topic Date Due     HEPATITIS C SCREENING  05/30/1965     PNEUMOCOCCAL (1 of 2 - PCV13) 05/30/2012     MAMMO SCREEN Q2 YR (SYSTEM ASSIGNED)  08/01/2013     ADVANCE DIRECTIVE PLANNING Q5 YRS  08/02/2016     Clinical Pathway: None    Medication Management:  No concerns noted.      Functional Status:  Dependent ADLs:: Wheelchair-independent  Dependent IADLs:: Cooking, Cleaning, Laundry, Shopping, Meal Preparation, Transportation  Bed or wheelchair confined:: No  Mobility Status: Independent w/Device  Fallen 2 or more times in the past year?: No  Any fall with injury in the past year?: No    Living Situation:  Current living arrangement:: I live in a private home with spouse  Type of residence:: Private home - no stairs    Diet/Exercise/Sleep:  Diet:: Regular  Inadequate nutrition (GOAL):: No  Food Insecurity:  No  Tube Feeding: No  Exercise:: Unable to exercise  Inadequate activity/exercise (GOAL):: No  Significant changes in sleep pattern (GOAL): No    Transportation:  Transportation concerns (GOAL):: No      Psychosocial:  Uatsdin or spiritual beliefs that impact treatment:: No  Mental health DX:: Yes  Mental health DX how managed:: Medication  Mental health management concern (GOAL):: Yes  Informal Support system:: Family, Spouse     Financial/Insurance:   Financial/Insurance concerns (GOAL):: No  [employer] for Seniors.  Low income     Resources and Interventions:  Current Resources:    She did talk to Lillie Mobley at [facility] about her waiver services. She would like to see if her bathroom could be modified to have a shower installed and her bathtub removed.  Lillie is on vacation and will discuss with her when she returns.          Equipment Currently Used at Home: walker, standard, wheelchair, manual, other  Advance Care Plan/Directive  Advanced Care Plans/Directives on file:: No     Goals:   Goals        General    Pain Management (pt-stated)     Notes - Note created  4/5/2018  2:53 PM by Francisca Hernandez LSW    Goal Statement: I will have less pain.  Measure of Success: Will have intervention that addresses my pain.   Supportive Steps to Achieve: CC will contact PCP to discuss patient concerns and next steps.   Barriers: Cannot leave the house without extreme effort. Is having extreme pain and does not have any sense that it will get better. Her  is her caregiver and he is frustrated with her pain.  Strengths: Has  who supports her.    Date to Achieve By: 9-1-2018              Patient/Caregiver understanding: She is getting her new wheelchair delivered on Tuesday, July 31 and she is anxious to get it.  She is tired of sitting in her old chair as it is not comfortable.     Outreach Frequency: 2 weeks  Future Appointments              Today Bry Garcia MD [facility] ORTHOPEDIC  SURGERY, [facility] - [place]          Plan: Call in one week to discuss her surgery and her new wheelchair.      Francisca Hernandez,   [facility]  [email or web address]  [phone]

## 2018-07-26 NOTE — MR AVS SNAPSHOT
After Visit Summary   7/26/2018    Cristy Bailey    MRN: [id number]           Patient Information     Date Of Birth          1945        Visit Information        Provider Department      7/26/2018 2:20 PM Bry Garcia MD [facility] ORTHOPEDIC SURGERY        Today's Diagnoses     Avascular necrosis of bone of right hip (H)    -  1       Follow-ups after your visit        Who to contact     If you have questions or need follow up information about today's clinic visit or your schedule please contact [facility] ORTHOPEDIC SURGERY directly at [phone].  Normal or non-critical lab and imaging results will be communicated to you by [employer]ral Diagnosticshart, letter or phone within 4 business days after the clinic has received the results. If you do not hear from us within 7 days, please contact the clinic through Click Securityt or phone. If you have a critical or abnormal lab result, we will notify you by phone as soon as possible.  Submit refill requests through PureLiFi or call your pharmacy and they will forward the refill request to us. Please allow 3 business days for your refill to be completed.          Additional Information About Your Visit        MyChart Information     PureLiFi gives you secure access to your electronic health record. If you see a primary care provider, you can also send messages to your care team and make appointments. If you have questions, please call your primary care clinic.  If you do not have a primary care provider, please call [phone] and they will assist you.        Care EveryWhere ID     This is your Care EveryWhere ID. This could be used by other organizations to access your [place] medical records  [id number]        Your Vitals Were     BMI (Body Mass Index)                   41.16 kg/m2            Blood Pressure from Last 3 Encounters:   08/02/18 99/43   07/28/18 113/64   07/26/18 142/86    Weight from Last 3 Encounters:   07/28/18 250 lb (113.4 kg)    07/26/18 255 lb (115.7 kg)   06/29/18 255 lb (115.7 kg)              We Performed the Following     Snow-Operative Worksheet        Primary Care Provider Office Phone # Fax #    Manoj Daley PA-C [phone]       [address] [place] [address]        Goals        General    Pain Management (pt-stated)     Notes - Note created  4/5/2018  2:53 PM by Francisca Hernandez LSW    Goal Statement: I will have less pain.  Measure of Success: Will have intervention that addresses my pain.   Supportive Steps to Achieve: CC will contact PCP to discuss patient concerns and next steps.   Barriers: Cannot leave the house without extreme effort. Is having extreme pain and does not have any sense that it will get better. Her  is her caregiver and he is frustrated with her pain.  Strengths: Has  who supports her.    Date to Achieve By: 9-1-2018        Equal Access to Services     MICHELLE WISE : Hadii braden Martin, waaxda lugeorgia, qaybta kaalmada andrea, larry camp. So [facility] [phone].    ATENCIÓN: Si habla español, tiene a pompa disposición servicios gratuitos de asistencia lingüística. Llame al [phone].    We comply with applicable federal civil rights laws and [place] laws. We do not discriminate on the basis of race, color, national origin, age, disability, sex, sexual orientation, or gender identity.            Thank you!     Thank you for choosing [facility] ORTHOPEDIC SURGERY  for your care. Our goal is always to provide you with excellent care. Hearing back from our patients is one way we can continue to improve our services. Please take a few minutes to complete the written survey that you may receive in the mail after your visit with us. Thank you!             Your Updated Medication List - Protect others around you: Learn how to safely use, store and throw away your medicines at www.disposemymeds.org.          This list is  accurate as of 7/26/18 11:59 PM.  Always use your most recent med list.                   Brand Name Dispense Instructions for use Diagnosis    acetaminophen 325 MG tablet    TYLENOL    100 tablet    Take 3 tablets (975 mg) by mouth every 8 hours    S/P hip replacement, right       aspirin 325 MG tablet     45 tablet    Take 1 tablet (325 mg) by mouth daily    Venous thromboembolism (VTE) prophylaxis prescribed at discharge       bisacodyl 10 MG Suppository    DULCOLAX    25 suppository    Place 1 suppository (10 mg) rectally daily as needed for constipation    Constipation due to pain medication       diazepam 10 MG tablet    VALIUM    1 tablet    Take 1 tablet (10 mg) by mouth every 6 hours as needed for anxiety or sleep Take 30-60 minutes before procedure.  Do not operate a vehicle after taking this medication.    Weakness of both lower extremities       ferrous sulfate 325 (65 Fe) MG tablet    IRON    100 tablet    Take 1 tablet (325 mg) by mouth 2 times daily    Other iron deficiency anemia       gabapentin 300 MG capsule    NEURONTIN    180 capsule    Take 1 capsule (300 mg) by mouth 2 times daily    Weakness of both lower extremities, Bilateral leg pain       HYDROcodone-acetaminophen 5-325 MG per tablet    NORCO    30 tablet    Take 1 tablet by mouth At Bedtime    Bilateral leg pain       HYDROmorphone 2 MG tablet    DILAUDID    40 tablet    Take 1 tablet (2 mg) by mouth every 4 hours as needed for other (pain control or improvement in physical function. Hold dose for analgesic side effects.)    S/P hip replacement, right       lisinopril 5 MG tablet    PRINIVIL/ZESTRIL    90 tablet    Take 1 tablet (5 mg) by mouth daily    HTN, goal below 140/90       order for DME     2 Package    Equipment being ordered: Compression stockings 20-30mmHg    Lower extremity edema       polyethylene glycol Packet    MIRALAX/GLYCOLAX    7 packet    Take 17 g by mouth 2 times daily as needed (constipation)    Constipation due  to pain medication       senna-docusate 8.6-50 MG per tablet    SENOKOT-S;PERICOLACE    100 tablet    Take 1 tablet by mouth 2 times daily as needed for constipation    Constipation due to pain medication

## 2018-07-26 NOTE — TELEPHONE ENCOUNTER
Patient has been scheduled for surgery. Details are below.    Surgery: R JENAE  Location: [facility]  Date/Time: 7/30/2018 @ 7:30AM  Surgeon: Dr. Garcia  Surgery Consult: MICHEL  Pre-Op Physical: 7/27/2018  Post-Op: 8/9/2018   Packet Mailed: Yes  Consent Faxed: yes  Added to [place]: yes      Genoveva Dominguez CMA ([facility])  Podiatry / Foot & Ankle Surgery  [facility]

## 2018-07-26 NOTE — PROGRESS NOTES
HISTORY OF PRESENT ILLNESS:    Cristy Bailey is a 73 year old female who is seen in follow up for right hip pain, discuss total hip replacement.   Present symptoms:  Radiating pains into legs continue, muscle tightness and stiffness, tingling in right leg and bilateral feet.  Muscle spasms in left thigh.   Increased lower extremity pain, and muscle tightness.   Treatments tried to this point: Tylenol, Gabapentin, right hip intra-articular CSI 6/20/18: patient reported a few weeks of relief and decreased shooting pains.     PHYSICAL EXAM:  /86 (BP Location: Right arm, Patient Position: Chair, Cuff Size: Adult Large)  Wt 255 lb (115.7 kg)  BMI 41.16 kg/m2  Body mass index is 41.16 kg/(m^2).   GENERAL APPEARANCE: healthy, alert and no distress   SKIN: no suspicious lesions or rashes  NEURO: Normal strength and tone, mentation intact and speech normal  VASCULAR: None good pulses, and cappillary refill   LYMPH: no lymphadenopathy   PSYCH:  mentation appears normal and affect normal/bright    MSK:  The patient ambulates without an antalgic gait.  The patient is able to get on and off the exam table without difficulty.    Examination of the spine reveals a normal lordosis to the cervical and lumbar spine, and a normal kyphosis to the thoracic spine.  There is no clinical evidence of scoliosis.    The pelvis is clinically level.  Trendelenberg is negative.  The patient is nontender to palpation over the greater trochanteric bursal region or piriformis fossa.  With the hip flexed to 90 degrees, internal and external rotation is 20/40 respectively,  with pain at extremes.  The calves and thighs are symmetric, without atrophy and non-tender to palpation. Leilani's sign is negative, bilaterally.   CMS is intact to the toes.       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  We discussed the potential risks as well as benefits of total hip arthroplasty and  she would like to proceed as soon as possible.We will schedule this for a week from Monday.      Ryan Garcia MD  Dept. Orthopedic Surgery  [facility]

## 2018-07-28 ENCOUNTER — OFFICE VISIT (OUTPATIENT)
Dept: FAMILY MEDICINE | Facility: CLINIC | Age: 73
End: 2018-07-28
Payer: COMMERCIAL

## 2018-07-28 VITALS
HEIGHT: 66 IN | BODY MASS INDEX: 40.18 KG/M2 | HEART RATE: 81 BPM | SYSTOLIC BLOOD PRESSURE: 113 MMHG | DIASTOLIC BLOOD PRESSURE: 64 MMHG | TEMPERATURE: 97.5 F | WEIGHT: 250 LBS | RESPIRATION RATE: 20 BRPM

## 2018-07-28 DIAGNOSIS — I10 ESSENTIAL HYPERTENSION: ICD-10-CM

## 2018-07-28 DIAGNOSIS — Z01.818 PREOP GENERAL PHYSICAL EXAM: Primary | ICD-10-CM

## 2018-07-28 DIAGNOSIS — M87.051 AVASCULAR NECROSIS OF BONE OF HIP, RIGHT (H): ICD-10-CM

## 2018-07-28 DIAGNOSIS — E66.01 MORBID OBESITY (H): ICD-10-CM

## 2018-07-28 LAB
ANION GAP SERPL CALCULATED.3IONS-SCNC: 8 MMOL/L (ref 3–14)
BUN SERPL-MCNC: 17 MG/DL (ref 7–30)
CALCIUM SERPL-MCNC: 8.8 MG/DL (ref 8.5–10.1)
CHLORIDE SERPL-SCNC: 106 MMOL/L (ref 94–109)
CO2 SERPL-SCNC: 29 MMOL/L (ref 20–32)
CREAT SERPL-MCNC: 0.6 MG/DL (ref 0.52–1.04)
GFR SERPL CREATININE-BSD FRML MDRD: >90 ML/MIN/1.7M2
GLUCOSE SERPL-MCNC: 101 MG/DL (ref 70–99)
HGB BLD-MCNC: 14.6 G/DL (ref 11.7–15.7)
MRSA DNA SPEC QL NAA+PROBE: NEGATIVE
POTASSIUM SERPL-SCNC: 4 MMOL/L (ref 3.4–5.3)
SODIUM SERPL-SCNC: 143 MMOL/L (ref 133–144)
SPECIMEN SOURCE: NORMAL

## 2018-07-28 PROCEDURE — 87640 STAPH A DNA AMP PROBE: CPT | Performed by: PHYSICIAN ASSISTANT

## 2018-07-28 PROCEDURE — 99207 C PAF COMPLETED  NO CHARGE: CPT | Performed by: PHYSICIAN ASSISTANT

## 2018-07-28 PROCEDURE — 36415 COLL VENOUS BLD VENIPUNCTURE: CPT | Performed by: PHYSICIAN ASSISTANT

## 2018-07-28 PROCEDURE — 87641 MR-STAPH DNA AMP PROBE: CPT | Performed by: PHYSICIAN ASSISTANT

## 2018-07-28 PROCEDURE — 80048 BASIC METABOLIC PNL TOTAL CA: CPT | Performed by: PHYSICIAN ASSISTANT

## 2018-07-28 PROCEDURE — 99214 OFFICE O/P EST MOD 30 MIN: CPT | Performed by: PHYSICIAN ASSISTANT

## 2018-07-28 PROCEDURE — 93000 ELECTROCARDIOGRAM COMPLETE: CPT | Performed by: PHYSICIAN ASSISTANT

## 2018-07-28 PROCEDURE — 85018 HEMOGLOBIN: CPT | Performed by: PHYSICIAN ASSISTANT

## 2018-07-28 NOTE — MR AVS SNAPSHOT
After Visit Summary   7/28/2018    Cristy Bailey    MRN: [id number]           Patient Information     Date Of Birth          1945        Visit Information        Provider Department      7/28/2018 10:20 AM Nick Schuster PA-C [facility]        Today's Diagnoses     Preop general physical exam    -  1    Avascular necrosis of bone of hip, right (H)        Essential hypertension          Care Instructions      Before Your Surgery      Call your surgeon if there is any change in your health. This includes signs of a cold or flu (such as a sore throat, runny nose, cough, rash or fever).    Do not smoke, drink alcohol or take over the counter medicine (unless your surgeon or primary care doctor tells you to) for the 24 hours before and after surgery.    If you take prescribed drugs: Follow your doctor s orders about which medicines to take and which to stop until after surgery.    Eating and drinking prior to surgery: follow the instructions from your surgeon    Take a shower or bath the night before surgery. Use the soap your surgeon gave you to gently clean your skin. If you do not have soap from your surgeon, use your regular soap. Do not shave or scrub the surgery site.  Wear clean pajamas and have clean sheets on your bed.           Follow-ups after your visit        Your next 10 appointments already scheduled     Jul 30, 2018   Procedure with Bry Garcia MD   [facility] PeriOp Services (--)    [address] [facility] [address]   [phone]            Aug 09, 2018 11:20 AM CDT   Return Visit with Donal Grover PA-C   [facility] ORTHOPEDIC SURGERY ([facility])    [address] [address] [address]   [phone]              Who to contact     If you have questions or need follow up information about today's clinic visit or your schedule please contact [facility]  "directly at [phone].  Normal or non-critical lab and imaging results will be communicated to you by Light Harmonichart, letter or phone within 4 business days after the clinic has received the results. If you do not hear from us within 7 days, please contact the clinic through Light Harmonichart or phone. If you have a critical or abnormal lab result, we will notify you by phone as soon as possible.  Submit refill requests through Verold or call your pharmacy and they will forward the refill request to us. Please allow 3 business days for your refill to be completed.          Additional Information About Your Visit        Light Harmonichart Information     Verold gives you secure access to your electronic health record. If you see a primary care provider, you can also send messages to your care team and make appointments. If you have questions, please call your primary care clinic.  If you do not have a primary care provider, please call [phone] and they will assist you.        Care EveryWhere ID     This is your Care EveryWhere ID. This could be used by other organizations to access your [place] medical records  [id number]        Your Vitals Were     Pulse Temperature Respirations Height Breastfeeding? BMI (Body Mass Index)    81 97.5  F (36.4  C) (Oral) 20 5' 6\" (1.676 m) No 40.35 kg/m2       Blood Pressure from Last 3 Encounters:   07/28/18 113/64   07/26/18 142/86   07/20/18 141/62    Weight from Last 3 Encounters:   07/28/18 250 lb (113.4 kg)   07/26/18 255 lb (115.7 kg)   06/29/18 255 lb (115.7 kg)              We Performed the Following     Basic metabolic panel     EKG 12-lead complete w/read - Clinics     Hemoglobin     Methicillin Resist/Sens S. aureus PCR          Today's Medication Changes          These changes are accurate as of 7/28/18 10:41 AM.  If you have any questions, ask your nurse or doctor.               Stop taking these medicines if you haven't already. Please contact your care team if you have questions.     " diazepam 10 MG tablet   Commonly known as:  VALIUM   Stopped by:  Nick Schuster PA-C           HYDROcodone-acetaminophen 5-325 MG per tablet   Commonly known as:  NORCO   Stopped by:  Nick Schuster PA-C           order for DME   Stopped by:  Nick Schuster PA-C                    Primary Care Provider Office Phone # Fax #    Manoj Jones RONNIE Daley [phone]       [address]  [facility] [address]        Goals        General    Pain Management (pt-stated)     Notes - Note created  4/5/2018  2:53 PM by Francisca Hernandez LSW    Goal Statement: I will have less pain.  Measure of Success: Will have intervention that addresses my pain.   Supportive Steps to Achieve: CC will contact PCP to discuss patient concerns and next steps.   Barriers: Cannot leave the house without extreme effort. Is having extreme pain and does not have any sense that it will get better. Her  is her caregiver and he is frustrated with her pain.  Strengths: Has  who supports her.    Date to Achieve By: 9-1-2018        Equal Access to Services     [facility]: Hadii braden quintana Sosusie, waaxda luqadaha, qaybta kaalmacarrie mendez, larry davalos . So [facility] [phone].    ATENCIÓN: Si habla español, tiene a pompa disposición servicios gratuitos de asistencia lingüística. Llame al [phone].    We comply with applicable federal civil rights laws and [place] laws. We do not discriminate on the basis of race, color, national origin, age, disability, sex, sexual orientation, or gender identity.            Thank you!     Thank you for choosing [facility]  for your care. Our goal is always to provide you with excellent care. Hearing back from our patients is one way we can continue to improve our services. Please take a few minutes to complete the written survey that you may receive in the mail after your visit with us. Thank you!              Your Updated Medication List - Protect others around you: Learn how to safely use, store and throw away your medicines at www.disposemymeds.org.          This list is accurate as of 7/28/18 10:41 AM.  Always use your most recent med list.                   Brand Name Dispense Instructions for use Diagnosis    gabapentin 300 MG capsule    NEURONTIN    180 capsule    Take 1 capsule (300 mg) by mouth 2 times daily    Weakness of both lower extremities, Bilateral leg pain       lisinopril 5 MG tablet    PRINIVIL/ZESTRIL    90 tablet    Take 1 tablet (5 mg) by mouth daily    HTN, goal below 140/90

## 2018-07-28 NOTE — PROGRESS NOTES
[facility]  [address] [place] [address]  [phone]  Dept: [phone]    PRE-OP EVALUATION:  Today's date: 2018    Cristy Bailey (: 1945) presents for pre-operative evaluation assessment as requested by Dr. Ryan Engel.  She requires evaluation and anesthesia risk assessment prior to undergoing surgery/procedure for treatment of right hip replacement .    ARTHROPLASTY HIP Right Other   Right total hip arthroplasty     Choice anesthesia           Fax number for surgical facility: [phone]  Primary Physician: Manoj Daley  Type of Anesthesia Anticipated: Choice    Patient has a Health Care Directive or Living Will:  NO    Preop Questions 2018   Who is doing your surgery? ryan engel   What are you having done? right hip replacement   Date of Surgery/Procedure:    Facility or Hospital where procedure/surgery will be performed: [facility]   1.  Do you have a history of Heart attack, stroke, stent, coronary bypass surgery, or other heart surgery? No   2.  Do you ever have any pain or discomfort in your chest? No   3.  Do you have a history of  Heart Failure? No   4.   Are you troubled by shortness of breath when:  walking on a level surface, or up a slight hill, or at night? No   5.  Do you currently have a cold, bronchitis or other respiratory infection? No   6.  Do you have a cough, shortness of breath, or wheezing? No   7.  Do you sometimes get pains in the calves of your legs when you walk? No   8. Do you or anyone in your family have previous history of blood clots? No   9.  Do you or does anyone in your family have a serious bleeding problem such as prolonged bleeding following surgeries or cuts? No   10. Have you ever had problems with anemia or been told to take iron pills? No   11. Have you had any abnormal blood loss such as black, tarry or bloody stools, or abnormal vaginal bleeding? No   12. Have you ever had a  blood transfusion? No   13. Have you or any of your relatives ever had problems with anesthesia? No   14. Do you have sleep apnea, excessive snoring or daytime drowsiness? No   15. Do you have any prosthetic heart valves? No   16. Do you have prosthetic joints? No   17. Is there any chance that you may be pregnant? No         HPI:     HPI related to upcoming procedure: one year of hip pain due to avascular necrosis on the right.      See problem list for active medical problems.  Problems all longstanding and stable, except as noted/documented.  See ROS for pertinent symptoms related to these conditions.                                                                                                                                                          .    MEDICAL HISTORY:     Patient Active Problem List    Diagnosis Date Noted     Morbid obesity (H) 09/22/2017     Priority: Medium     Constipation 03/25/2015     Priority: Medium     Problem list name updated by automated process. Provider to review       Myalgia and myositis 03/25/2015     Priority: Medium     Problem list name updated by automated process. Provider to review       Leg weakness 03/24/2015     Priority: Medium     Constipation 04/01/2014     Priority: Medium     HTN, goal below 140/90 07/30/2013     Priority: Medium     Health Care Home 05/18/2012     Priority: Medium     FPA [employer] for .  Amy Wray RN-BSN, [facility]  [phone]   DX V65.8 REPLACED WITH [id number] HEALTH CARE HOME (04/08/2013)       CRP elevated 05/17/2012     Priority: Medium     Vitamin D deficiency 05/17/2012     Priority: Medium     Leg weakness, bilateral 05/13/2012     Priority: Medium     Hyperlipidemia LDL goal <160 08/02/2011     Priority: Medium     Abnormal glucose 08/02/2011     Priority: Medium     IMO update changed this record. Please review for accuracy       Obesity 08/02/2011     Priority: Medium     Advanced directives,  counseling/discussion 08/02/2011     Priority: Medium     Advance Directive Problem List Overview:   Name Relationship Phone    Primary Health Care Agent            Alternative Health Care Agent          Discussed advance care planning with patient; information given to patient to review. 8/2/2011          HTN (hypertension) 01/19/2010     Priority: Medium      Past Medical History:   Diagnosis Date     Arthritis     hip     HTN (hypertension) 1/2010      Leg weakness 3/24/2015     Lyme disease 1980'S AND 2004    lYME DZ LIKE SXS RESPONDED TO ABX     Past Surgical History:   Procedure Laterality Date     BIOPSY OF UTERUS LINING  3/2010    negative.      C C-SEC ONLY,PREV C-SEC      c-sec x 3      COLONOSCOPY  2/21/10    benign findings. advised 10 yr f/u.      Current Outpatient Prescriptions   Medication Sig Dispense Refill     diazepam (VALIUM) 10 MG tablet Take 1 tablet (10 mg) by mouth every 6 hours as needed for anxiety or sleep Take 30-60 minutes before procedure.  Do not operate a vehicle after taking this medication. (Patient not taking: Reported on 7/26/2018) 1 tablet 0     gabapentin (NEURONTIN) 300 MG capsule Take 1 capsule (300 mg) by mouth 2 times daily 180 capsule 0     HYDROcodone-acetaminophen (NORCO) 5-325 MG per tablet Take 1 tablet by mouth At Bedtime 30 tablet 0     lisinopril (PRINIVIL/ZESTRIL) 5 MG tablet Take 1 tablet (5 mg) by mouth daily 90 tablet 1     order for DME Equipment being ordered: Compression stockings 20-30mmHg 2 Package 0     OTC products: no recent use of OTC ASA, NSAIDS or Steroids    Allergies   Allergen Reactions     No Known Drug Allergies       Latex Allergy: NO    Social History   Substance Use Topics     Smoking status: Former Smoker     Quit date: 8/2/1984     Smokeless tobacco: Never Used     Alcohol use No     History   Drug Use No       REVIEW OF SYSTEMS:   Constitutional, HEENT, cardiovascular, pulmonary, gi and gu systems are negative, except as otherwise  noted.    EXAM:   There were no vitals taken for this visit.    GENERAL APPEARANCE: healthy, alert and no distress     EYES: EOMI, PERRL     HENT: ear canals and TM's normal and nose and mouth without ulcers or lesions     NECK: no adenopathy, no asymmetry, masses, or scars and thyroid normal to palpation     RESP: lungs clear to auscultation - no rales, rhonchi or wheezes     CV: regular rates and rhythm, normal S1 S2, no S3 or S4 and no murmur, click or rub     ABDOMEN:  soft, nontender, no HSM or masses and bowel sounds normal     MS: extremities normal- no gross deformities noted, no evidence of inflammation in joints, FROM in all extremities.     SKIN: no suspicious lesions or rashes     NEURO: Normal strength and tone, sensory exam grossly normal, mentation intact and speech normal     PSYCH: mentation appears normal. and affect normal/bright     LYMPHATICS: No cervical adenopathy    DIAGNOSTICS:     EKG: appears normal, NSR, normal axis, normal intervals, no acute ST/T changes c/w ischemia, no LVH by voltage criteria, unchanged from previous tracings.  MRSA/MSSA screening for elective joint replacement surgery.  Labs Resulted Today:   Results for orders placed or performed in visit on 07/28/18   Hemoglobin   Result Value Ref Range    Hemoglobin 14.6 11.7 - 15.7 g/dL     Labs Drawn and in Process:   Unresulted Labs Ordered in the Past 30 Days of this Admission     Date and Time Order Name Status Description    7/28/2018 1035 METHICILLIN RESIST/SENS S. AUREUS PCR In process     7/28/2018 1012 BASIC METABOLIC PANEL In process           Recent Labs   Lab Test  04/30/18   1246  02/08/18   1907   03/24/15   0827   05/03/12   0837   HGB  14.3  14.0   --    --    < >   --    PLT  283  280   --    --    < >   --    NA  142  141   < >  142   < >  139   POTASSIUM  3.8  4.3   < >  4.8   < >  4.4   CR  0.58  0.64   < >  0.67   < >  0.64   A1C   --    --    --   5.9   --   5.8    < > = values in this interval not  displayed.      IMPRESSION:   Reason for surgery/procedure: right hip pain  Diagnosis/reason for consult: pre op exam    The proposed surgical procedure is considered INTERMEDIATE risk.    REVISED CARDIAC RISK INDEX  The patient has the following serious cardiovascular risks for perioperative complications such as (MI, PE, VFib and 3  AV Block):  No serious cardiac risks  INTERPRETATION: 0 risks: Class I (very low risk - 0.4% complication rate)    The patient has the following additional risks for perioperative complications:  No identified additional risks  Morbid obesity      ICD-10-CM    1. Preop general physical exam Z01.818 Hemoglobin     EKG 12-lead complete w/read - Clinics     Basic metabolic panel     Methicillin Resist/Sens S. aureus PCR     CANCELED: MRSA MSSA Presurgical Screen   2. Avascular necrosis of bone of hip, right (H) M87.051    3. Morbid obesity (H) E66.01    4. Essential hypertension I10          RECOMMENDATIONS:     --Consult hospital rounder / IM to assist post-op medical management      ACE Inhibitor or Angiotensin Receptor Blocker (ARB) Use  Ace inhibitor or Angiotensin Receptor Blocker (ARB) and should HOLD this medication for the 24 hours prior to surgery.      APPROVAL GIVEN to proceed with proposed procedure, without further diagnostic evaluation       Signed Electronically by: Nick Schuster PA-C    Copy of this evaluation report is provided to requesting physician.    [place] Preop Guidelines    Revised Cardiac Risk Index

## 2018-07-30 ENCOUNTER — APPOINTMENT (OUTPATIENT)
Dept: PHYSICAL THERAPY | Facility: CLINIC | Age: 73
DRG: 470 | End: 2018-07-30
Attending: ORTHOPAEDIC SURGERY
Payer: COMMERCIAL

## 2018-07-30 ENCOUNTER — ANESTHESIA (OUTPATIENT)
Dept: SURGERY | Facility: CLINIC | Age: 73
DRG: 470 | End: 2018-07-30
Payer: COMMERCIAL

## 2018-07-30 ENCOUNTER — ANESTHESIA EVENT (OUTPATIENT)
Dept: SURGERY | Facility: CLINIC | Age: 73
DRG: 470 | End: 2018-07-30
Payer: COMMERCIAL

## 2018-07-30 ENCOUNTER — HOSPITAL ENCOUNTER (INPATIENT)
Facility: CLINIC | Age: 73
LOS: 3 days | Discharge: NURSING, REHAB OR LONG-TERM CARE | DRG: 470 | End: 2018-08-02
Attending: ORTHOPAEDIC SURGERY | Admitting: ORTHOPAEDIC SURGERY
Payer: COMMERCIAL

## 2018-07-30 ENCOUNTER — APPOINTMENT (OUTPATIENT)
Dept: GENERAL RADIOLOGY | Facility: CLINIC | Age: 73
DRG: 470 | End: 2018-07-30
Attending: ORTHOPAEDIC SURGERY
Payer: COMMERCIAL

## 2018-07-30 DIAGNOSIS — Z96.641 S/P HIP REPLACEMENT, RIGHT: Primary | ICD-10-CM

## 2018-07-30 DIAGNOSIS — D50.8 OTHER IRON DEFICIENCY ANEMIA: ICD-10-CM

## 2018-07-30 DIAGNOSIS — K59.03 CONSTIPATION DUE TO PAIN MEDICATION: ICD-10-CM

## 2018-07-30 PROBLEM — M16.9 HIP OSTEOARTHRITIS: Status: ACTIVE | Noted: 2018-07-30

## 2018-07-30 LAB
CREAT SERPL-MCNC: 0.6 MG/DL (ref 0.52–1.04)
GFR SERPL CREATININE-BSD FRML MDRD: >90 ML/MIN/1.7M2
PLATELET # BLD AUTO: 153 10E9/L (ref 150–450)

## 2018-07-30 PROCEDURE — 25000128 H RX IP 250 OP 636: Performed by: ANESTHESIOLOGY

## 2018-07-30 PROCEDURE — 37000009 ZZH ANESTHESIA TECHNICAL FEE, EACH ADDTL 15 MIN: Performed by: ORTHOPAEDIC SURGERY

## 2018-07-30 PROCEDURE — 25800025 ZZH RX 258: Performed by: ORTHOPAEDIC SURGERY

## 2018-07-30 PROCEDURE — 25000566 ZZH SEVOFLURANE, EA 15 MIN: Performed by: ORTHOPAEDIC SURGERY

## 2018-07-30 PROCEDURE — 40000986 XR PELVIS 1/2 VW

## 2018-07-30 PROCEDURE — 25000125 ZZHC RX 250: Performed by: ANESTHESIOLOGY

## 2018-07-30 PROCEDURE — 0SR901A REPLACEMENT OF RIGHT HIP JOINT WITH METAL SYNTHETIC SUBSTITUTE, UNCEMENTED, OPEN APPROACH: ICD-10-PCS | Performed by: ORTHOPAEDIC SURGERY

## 2018-07-30 PROCEDURE — 27210794 ZZH OR GENERAL SUPPLY STERILE: Performed by: ORTHOPAEDIC SURGERY

## 2018-07-30 PROCEDURE — 36415 COLL VENOUS BLD VENIPUNCTURE: CPT | Performed by: ORTHOPAEDIC SURGERY

## 2018-07-30 PROCEDURE — 25000132 ZZH RX MED GY IP 250 OP 250 PS 637: Performed by: ORTHOPAEDIC SURGERY

## 2018-07-30 PROCEDURE — 99221 1ST HOSP IP/OBS SF/LOW 40: CPT | Performed by: INTERNAL MEDICINE

## 2018-07-30 PROCEDURE — 36000093 ZZH SURGERY LEVEL 4 1ST 30 MIN: Performed by: ORTHOPAEDIC SURGERY

## 2018-07-30 PROCEDURE — 27210995 ZZH RX 272: Performed by: ORTHOPAEDIC SURGERY

## 2018-07-30 PROCEDURE — 25000128 H RX IP 250 OP 636: Performed by: ORTHOPAEDIC SURGERY

## 2018-07-30 PROCEDURE — C1776 JOINT DEVICE (IMPLANTABLE): HCPCS | Performed by: ORTHOPAEDIC SURGERY

## 2018-07-30 PROCEDURE — 25000128 H RX IP 250 OP 636: Performed by: NURSE ANESTHETIST, CERTIFIED REGISTERED

## 2018-07-30 PROCEDURE — 71000014 ZZH RECOVERY PHASE 1 LEVEL 2 FIRST HR: Performed by: ORTHOPAEDIC SURGERY

## 2018-07-30 PROCEDURE — 40000193 ZZH STATISTIC PT WARD VISIT: Performed by: PHYSICAL THERAPIST

## 2018-07-30 PROCEDURE — 25000128 H RX IP 250 OP 636: Performed by: PHYSICIAN ASSISTANT

## 2018-07-30 PROCEDURE — 36000063 ZZH SURGERY LEVEL 4 EA 15 ADDTL MIN: Performed by: ORTHOPAEDIC SURGERY

## 2018-07-30 PROCEDURE — 97161 PT EVAL LOW COMPLEX 20 MIN: CPT | Mod: GP | Performed by: PHYSICAL THERAPIST

## 2018-07-30 PROCEDURE — C1713 ANCHOR/SCREW BN/BN,TIS/BN: HCPCS | Performed by: ORTHOPAEDIC SURGERY

## 2018-07-30 PROCEDURE — 97110 THERAPEUTIC EXERCISES: CPT | Mod: GP | Performed by: PHYSICAL THERAPIST

## 2018-07-30 PROCEDURE — 82565 ASSAY OF CREATININE: CPT | Performed by: ORTHOPAEDIC SURGERY

## 2018-07-30 PROCEDURE — 37000008 ZZH ANESTHESIA TECHNICAL FEE, 1ST 30 MIN: Performed by: ORTHOPAEDIC SURGERY

## 2018-07-30 PROCEDURE — 27130 TOTAL HIP ARTHROPLASTY: CPT | Mod: RT | Performed by: ORTHOPAEDIC SURGERY

## 2018-07-30 PROCEDURE — 71000015 ZZH RECOVERY PHASE 1 LEVEL 2 EA ADDTL HR: Performed by: ORTHOPAEDIC SURGERY

## 2018-07-30 PROCEDURE — 85049 AUTOMATED PLATELET COUNT: CPT | Performed by: ORTHOPAEDIC SURGERY

## 2018-07-30 PROCEDURE — 12000007 ZZH R&B INTERMEDIATE

## 2018-07-30 PROCEDURE — 99207 ZZC CONSULT E&M CHANGED TO INITIAL LEVEL: CPT | Performed by: INTERNAL MEDICINE

## 2018-07-30 PROCEDURE — 40000306 ZZH STATISTIC PRE PROC ASSESS II: Performed by: ORTHOPAEDIC SURGERY

## 2018-07-30 DEVICE — IMP HEAD FEMORAL SNR COBALT 32MM +8 71303208: Type: IMPLANTABLE DEVICE | Site: HIP | Status: FUNCTIONAL

## 2018-07-30 DEVICE — IMPLANTABLE DEVICE: Type: IMPLANTABLE DEVICE | Site: HIP | Status: FUNCTIONAL

## 2018-07-30 DEVICE — IMP SHELL SNR ACET R3 3H 54MM 71335554: Type: IMPLANTABLE DEVICE | Site: HIP | Status: FUNCTIONAL

## 2018-07-30 RX ORDER — HYDROXYZINE HYDROCHLORIDE 10 MG/1
10 TABLET, FILM COATED ORAL EVERY 6 HOURS PRN
Status: DISCONTINUED | OUTPATIENT
Start: 2018-07-30 | End: 2018-08-02 | Stop reason: HOSPADM

## 2018-07-30 RX ORDER — AMOXICILLIN 250 MG
1 CAPSULE ORAL 2 TIMES DAILY
Status: DISCONTINUED | OUTPATIENT
Start: 2018-07-30 | End: 2018-08-02 | Stop reason: HOSPADM

## 2018-07-30 RX ORDER — AMOXICILLIN 250 MG
2 CAPSULE ORAL 2 TIMES DAILY
Status: DISCONTINUED | OUTPATIENT
Start: 2018-07-30 | End: 2018-08-02 | Stop reason: HOSPADM

## 2018-07-30 RX ORDER — KETOROLAC TROMETHAMINE 30 MG/ML
30 INJECTION, SOLUTION INTRAMUSCULAR; INTRAVENOUS EVERY 6 HOURS PRN
Status: DISCONTINUED | OUTPATIENT
Start: 2018-07-30 | End: 2018-07-30 | Stop reason: HOSPADM

## 2018-07-30 RX ORDER — LISINOPRIL 5 MG/1
5 TABLET ORAL DAILY
Status: DISCONTINUED | OUTPATIENT
Start: 2018-07-30 | End: 2018-07-31

## 2018-07-30 RX ORDER — ONDANSETRON 2 MG/ML
4 INJECTION INTRAMUSCULAR; INTRAVENOUS EVERY 30 MIN PRN
Status: DISCONTINUED | OUTPATIENT
Start: 2018-07-30 | End: 2018-07-30 | Stop reason: HOSPADM

## 2018-07-30 RX ORDER — SODIUM CHLORIDE, SODIUM LACTATE, POTASSIUM CHLORIDE, CALCIUM CHLORIDE 600; 310; 30; 20 MG/100ML; MG/100ML; MG/100ML; MG/100ML
INJECTION, SOLUTION INTRAVENOUS CONTINUOUS
Status: DISCONTINUED | OUTPATIENT
Start: 2018-07-30 | End: 2018-08-02 | Stop reason: HOSPADM

## 2018-07-30 RX ORDER — MAGNESIUM HYDROXIDE 1200 MG/15ML
LIQUID ORAL PRN
Status: DISCONTINUED | OUTPATIENT
Start: 2018-07-30 | End: 2018-07-30 | Stop reason: HOSPADM

## 2018-07-30 RX ORDER — LIDOCAINE 40 MG/G
CREAM TOPICAL
Status: DISCONTINUED | OUTPATIENT
Start: 2018-07-30 | End: 2018-08-02 | Stop reason: HOSPADM

## 2018-07-30 RX ORDER — FENTANYL CITRATE 50 UG/ML
INJECTION, SOLUTION INTRAMUSCULAR; INTRAVENOUS PRN
Status: DISCONTINUED | OUTPATIENT
Start: 2018-07-30 | End: 2018-07-30

## 2018-07-30 RX ORDER — NALOXONE HYDROCHLORIDE 0.4 MG/ML
.1-.4 INJECTION, SOLUTION INTRAMUSCULAR; INTRAVENOUS; SUBCUTANEOUS
Status: ACTIVE | OUTPATIENT
Start: 2018-07-30 | End: 2018-07-31

## 2018-07-30 RX ORDER — PROPOFOL 10 MG/ML
INJECTION, EMULSION INTRAVENOUS PRN
Status: DISCONTINUED | OUTPATIENT
Start: 2018-07-30 | End: 2018-07-30

## 2018-07-30 RX ORDER — SODIUM CHLORIDE, SODIUM LACTATE, POTASSIUM CHLORIDE, CALCIUM CHLORIDE 600; 310; 30; 20 MG/100ML; MG/100ML; MG/100ML; MG/100ML
INJECTION, SOLUTION INTRAVENOUS CONTINUOUS
Status: DISCONTINUED | OUTPATIENT
Start: 2018-07-30 | End: 2018-07-30 | Stop reason: HOSPADM

## 2018-07-30 RX ORDER — GLYCOPYRROLATE 0.2 MG/ML
INJECTION, SOLUTION INTRAMUSCULAR; INTRAVENOUS PRN
Status: DISCONTINUED | OUTPATIENT
Start: 2018-07-30 | End: 2018-07-30

## 2018-07-30 RX ORDER — HYDROMORPHONE HYDROCHLORIDE 2 MG/1
2-4 TABLET ORAL
Status: DISCONTINUED | OUTPATIENT
Start: 2018-07-30 | End: 2018-08-02 | Stop reason: HOSPADM

## 2018-07-30 RX ORDER — LIDOCAINE HYDROCHLORIDE 10 MG/ML
INJECTION, SOLUTION INFILTRATION; PERINEURAL PRN
Status: DISCONTINUED | OUTPATIENT
Start: 2018-07-30 | End: 2018-07-30

## 2018-07-30 RX ORDER — ACETAMINOPHEN 325 MG/1
650 TABLET ORAL EVERY 4 HOURS PRN
Status: DISCONTINUED | OUTPATIENT
Start: 2018-08-02 | End: 2018-08-02 | Stop reason: HOSPADM

## 2018-07-30 RX ORDER — ONDANSETRON 2 MG/ML
INJECTION INTRAMUSCULAR; INTRAVENOUS PRN
Status: DISCONTINUED | OUTPATIENT
Start: 2018-07-30 | End: 2018-07-30

## 2018-07-30 RX ORDER — NEOSTIGMINE METHYLSULFATE 1 MG/ML
VIAL (ML) INJECTION PRN
Status: DISCONTINUED | OUTPATIENT
Start: 2018-07-30 | End: 2018-07-30

## 2018-07-30 RX ORDER — LIDOCAINE 40 MG/G
CREAM TOPICAL
Status: DISCONTINUED | OUTPATIENT
Start: 2018-07-30 | End: 2018-07-30

## 2018-07-30 RX ORDER — CEFAZOLIN SODIUM 1 G/3ML
1 INJECTION, POWDER, FOR SOLUTION INTRAMUSCULAR; INTRAVENOUS SEE ADMIN INSTRUCTIONS
Status: DISCONTINUED | OUTPATIENT
Start: 2018-07-30 | End: 2018-07-30 | Stop reason: CLARIF

## 2018-07-30 RX ORDER — ONDANSETRON 2 MG/ML
4 INJECTION INTRAMUSCULAR; INTRAVENOUS EVERY 6 HOURS PRN
Status: DISCONTINUED | OUTPATIENT
Start: 2018-07-30 | End: 2018-08-02 | Stop reason: HOSPADM

## 2018-07-30 RX ORDER — FENTANYL CITRATE 50 UG/ML
50 INJECTION, SOLUTION INTRAMUSCULAR; INTRAVENOUS ONCE
Status: COMPLETED | OUTPATIENT
Start: 2018-07-30 | End: 2018-07-30

## 2018-07-30 RX ORDER — HYDROMORPHONE HYDROCHLORIDE 1 MG/ML
.3-.5 INJECTION, SOLUTION INTRAMUSCULAR; INTRAVENOUS; SUBCUTANEOUS EVERY 5 MIN PRN
Status: DISCONTINUED | OUTPATIENT
Start: 2018-07-30 | End: 2018-07-30 | Stop reason: HOSPADM

## 2018-07-30 RX ORDER — KETOROLAC TROMETHAMINE 15 MG/ML
15 INJECTION, SOLUTION INTRAMUSCULAR; INTRAVENOUS EVERY 6 HOURS
Status: COMPLETED | OUTPATIENT
Start: 2018-07-30 | End: 2018-07-31

## 2018-07-30 RX ORDER — CEFAZOLIN SODIUM 2 G/100ML
2 INJECTION, SOLUTION INTRAVENOUS
Status: COMPLETED | OUTPATIENT
Start: 2018-07-30 | End: 2018-07-30

## 2018-07-30 RX ORDER — CEFAZOLIN SODIUM 2 G/100ML
2 INJECTION, SOLUTION INTRAVENOUS EVERY 8 HOURS
Status: COMPLETED | OUTPATIENT
Start: 2018-07-30 | End: 2018-07-31

## 2018-07-30 RX ORDER — ONDANSETRON 4 MG/1
4 TABLET, ORALLY DISINTEGRATING ORAL EVERY 30 MIN PRN
Status: DISCONTINUED | OUTPATIENT
Start: 2018-07-30 | End: 2018-07-30 | Stop reason: HOSPADM

## 2018-07-30 RX ORDER — FENTANYL CITRATE 50 UG/ML
25-50 INJECTION, SOLUTION INTRAMUSCULAR; INTRAVENOUS
Status: DISCONTINUED | OUTPATIENT
Start: 2018-07-30 | End: 2018-07-30 | Stop reason: HOSPADM

## 2018-07-30 RX ORDER — HYDROMORPHONE HYDROCHLORIDE 1 MG/ML
.5-1 INJECTION, SOLUTION INTRAMUSCULAR; INTRAVENOUS; SUBCUTANEOUS
Status: DISCONTINUED | OUTPATIENT
Start: 2018-07-30 | End: 2018-08-02 | Stop reason: HOSPADM

## 2018-07-30 RX ORDER — GABAPENTIN 300 MG/1
300 CAPSULE ORAL 2 TIMES DAILY
Status: DISCONTINUED | OUTPATIENT
Start: 2018-07-30 | End: 2018-08-02 | Stop reason: HOSPADM

## 2018-07-30 RX ORDER — SODIUM CHLORIDE 9 MG/ML
INJECTION, SOLUTION INTRAVENOUS CONTINUOUS
Status: DISCONTINUED | OUTPATIENT
Start: 2018-07-30 | End: 2018-08-02 | Stop reason: HOSPADM

## 2018-07-30 RX ORDER — NALOXONE HYDROCHLORIDE 0.4 MG/ML
.1-.4 INJECTION, SOLUTION INTRAMUSCULAR; INTRAVENOUS; SUBCUTANEOUS
Status: DISCONTINUED | OUTPATIENT
Start: 2018-07-30 | End: 2018-08-02 | Stop reason: HOSPADM

## 2018-07-30 RX ORDER — CEFAZOLIN SODIUM 2 G/100ML
2 INJECTION, SOLUTION INTRAVENOUS
Status: DISCONTINUED | OUTPATIENT
Start: 2018-07-30 | End: 2018-07-30

## 2018-07-30 RX ORDER — DEXAMETHASONE SODIUM PHOSPHATE 4 MG/ML
INJECTION, SOLUTION INTRA-ARTICULAR; INTRALESIONAL; INTRAMUSCULAR; INTRAVENOUS; SOFT TISSUE PRN
Status: DISCONTINUED | OUTPATIENT
Start: 2018-07-30 | End: 2018-07-30

## 2018-07-30 RX ORDER — ACETAMINOPHEN 325 MG/1
975 TABLET ORAL EVERY 8 HOURS
Status: DISCONTINUED | OUTPATIENT
Start: 2018-07-30 | End: 2018-08-02 | Stop reason: HOSPADM

## 2018-07-30 RX ORDER — LABETALOL HYDROCHLORIDE 5 MG/ML
10 INJECTION, SOLUTION INTRAVENOUS
Status: DISCONTINUED | OUTPATIENT
Start: 2018-07-30 | End: 2018-07-30 | Stop reason: HOSPADM

## 2018-07-30 RX ORDER — ONDANSETRON 4 MG/1
4 TABLET, ORALLY DISINTEGRATING ORAL EVERY 6 HOURS PRN
Status: DISCONTINUED | OUTPATIENT
Start: 2018-07-30 | End: 2018-08-02 | Stop reason: HOSPADM

## 2018-07-30 RX ORDER — HYDRALAZINE HYDROCHLORIDE 20 MG/ML
2.5-5 INJECTION INTRAMUSCULAR; INTRAVENOUS EVERY 10 MIN PRN
Status: DISCONTINUED | OUTPATIENT
Start: 2018-07-30 | End: 2018-07-30 | Stop reason: HOSPADM

## 2018-07-30 RX ADMIN — SODIUM CHLORIDE, POTASSIUM CHLORIDE, SODIUM LACTATE AND CALCIUM CHLORIDE: 600; 310; 30; 20 INJECTION, SOLUTION INTRAVENOUS at 07:37

## 2018-07-30 RX ADMIN — PHENYLEPHRINE HYDROCHLORIDE 100 MCG: 10 INJECTION, SOLUTION INTRAMUSCULAR; INTRAVENOUS; SUBCUTANEOUS at 09:00

## 2018-07-30 RX ADMIN — HYDROXYZINE HYDROCHLORIDE 10 MG: 10 TABLET ORAL at 15:47

## 2018-07-30 RX ADMIN — FENTANYL CITRATE 50 MCG: 50 INJECTION, SOLUTION INTRAMUSCULAR; INTRAVENOUS at 06:49

## 2018-07-30 RX ADMIN — GABAPENTIN 300 MG: 300 CAPSULE ORAL at 20:37

## 2018-07-30 RX ADMIN — SODIUM CHLORIDE, POTASSIUM CHLORIDE, SODIUM LACTATE AND CALCIUM CHLORIDE: 600; 310; 30; 20 INJECTION, SOLUTION INTRAVENOUS at 08:30

## 2018-07-30 RX ADMIN — HYDROMORPHONE HYDROCHLORIDE 2 MG: 2 TABLET ORAL at 15:46

## 2018-07-30 RX ADMIN — KETOROLAC TROMETHAMINE 15 MG: 15 INJECTION, SOLUTION INTRAMUSCULAR; INTRAVENOUS at 18:49

## 2018-07-30 RX ADMIN — CEFAZOLIN SODIUM 2 G: 2 INJECTION, SOLUTION INTRAVENOUS at 07:37

## 2018-07-30 RX ADMIN — GLYCOPYRROLATE 0.2 MG: 0.2 INJECTION, SOLUTION INTRAMUSCULAR; INTRAVENOUS at 07:41

## 2018-07-30 RX ADMIN — MIDAZOLAM 1 MG: 1 INJECTION INTRAMUSCULAR; INTRAVENOUS at 11:58

## 2018-07-30 RX ADMIN — GLYCOPYRROLATE 0.3 MG: 0.2 INJECTION, SOLUTION INTRAMUSCULAR; INTRAVENOUS at 10:02

## 2018-07-30 RX ADMIN — HYDROMORPHONE HYDROCHLORIDE 0.5 MG: 1 INJECTION, SOLUTION INTRAMUSCULAR; INTRAVENOUS; SUBCUTANEOUS at 08:00

## 2018-07-30 RX ADMIN — ONDANSETRON 4 MG: 2 INJECTION INTRAMUSCULAR; INTRAVENOUS at 09:30

## 2018-07-30 RX ADMIN — FENTANYL CITRATE 25 MCG: 50 INJECTION INTRAMUSCULAR; INTRAVENOUS at 11:27

## 2018-07-30 RX ADMIN — Medication 0.5 MG: at 14:33

## 2018-07-30 RX ADMIN — LIDOCAINE HYDROCHLORIDE 40 MG: 10 INJECTION, SOLUTION INFILTRATION; PERINEURAL at 07:41

## 2018-07-30 RX ADMIN — CEFAZOLIN SODIUM 2 G: 2 INJECTION, SOLUTION INTRAVENOUS at 15:47

## 2018-07-30 RX ADMIN — SENNOSIDES AND DOCUSATE SODIUM 2 TABLET: 8.6; 5 TABLET ORAL at 20:37

## 2018-07-30 RX ADMIN — KETOROLAC TROMETHAMINE 30 MG: 30 INJECTION, SOLUTION INTRAMUSCULAR at 11:51

## 2018-07-30 RX ADMIN — ROCURONIUM BROMIDE 10 MG: 10 INJECTION INTRAVENOUS at 09:28

## 2018-07-30 RX ADMIN — FENTANYL CITRATE 50 MCG: 50 INJECTION, SOLUTION INTRAMUSCULAR; INTRAVENOUS at 09:30

## 2018-07-30 RX ADMIN — FENTANYL CITRATE 50 MCG: 50 INJECTION, SOLUTION INTRAMUSCULAR; INTRAVENOUS at 07:11

## 2018-07-30 RX ADMIN — Medication 3 MG: at 10:01

## 2018-07-30 RX ADMIN — MIDAZOLAM 2 MG: 1 INJECTION INTRAMUSCULAR; INTRAVENOUS at 07:37

## 2018-07-30 RX ADMIN — HYDROMORPHONE HYDROCHLORIDE 4 MG: 2 TABLET ORAL at 20:36

## 2018-07-30 RX ADMIN — ACETAMINOPHEN 975 MG: 325 TABLET, FILM COATED ORAL at 21:21

## 2018-07-30 RX ADMIN — FENTANYL CITRATE 25 MCG: 50 INJECTION INTRAMUSCULAR; INTRAVENOUS at 12:19

## 2018-07-30 RX ADMIN — FENTANYL CITRATE 100 MCG: 50 INJECTION, SOLUTION INTRAMUSCULAR; INTRAVENOUS at 07:41

## 2018-07-30 RX ADMIN — Medication 0.3 MG: at 11:38

## 2018-07-30 RX ADMIN — DEXAMETHASONE SODIUM PHOSPHATE 4 MG: 4 INJECTION, SOLUTION INTRA-ARTICULAR; INTRALESIONAL; INTRAMUSCULAR; INTRAVENOUS; SOFT TISSUE at 07:30

## 2018-07-30 RX ADMIN — ACETAMINOPHEN 975 MG: 325 TABLET, FILM COATED ORAL at 15:46

## 2018-07-30 RX ADMIN — SODIUM CHLORIDE, POTASSIUM CHLORIDE, SODIUM LACTATE AND CALCIUM CHLORIDE: 600; 310; 30; 20 INJECTION, SOLUTION INTRAVENOUS at 10:48

## 2018-07-30 RX ADMIN — FENTANYL CITRATE 25 MCG: 50 INJECTION INTRAMUSCULAR; INTRAVENOUS at 11:29

## 2018-07-30 RX ADMIN — PROPOFOL 200 MG: 10 INJECTION, EMULSION INTRAVENOUS at 07:41

## 2018-07-30 RX ADMIN — FENTANYL CITRATE 25 MCG: 50 INJECTION INTRAMUSCULAR; INTRAVENOUS at 11:19

## 2018-07-30 RX ADMIN — SODIUM CHLORIDE: 9 INJECTION, SOLUTION INTRAVENOUS at 15:47

## 2018-07-30 RX ADMIN — ROCURONIUM BROMIDE 50 MG: 10 INJECTION INTRAVENOUS at 07:41

## 2018-07-30 RX ADMIN — FENTANYL CITRATE 25 MCG: 50 INJECTION INTRAMUSCULAR; INTRAVENOUS at 11:05

## 2018-07-30 RX ADMIN — FENTANYL CITRATE 25 MCG: 50 INJECTION INTRAMUSCULAR; INTRAVENOUS at 12:49

## 2018-07-30 RX ADMIN — MIDAZOLAM 1 MG: 1 INJECTION INTRAMUSCULAR; INTRAVENOUS at 10:51

## 2018-07-30 RX ADMIN — ROCURONIUM BROMIDE 10 MG: 10 INJECTION INTRAVENOUS at 09:00

## 2018-07-30 RX ADMIN — SODIUM CHLORIDE, POTASSIUM CHLORIDE, SODIUM LACTATE AND CALCIUM CHLORIDE: 600; 310; 30; 20 INJECTION, SOLUTION INTRAVENOUS at 09:30

## 2018-07-30 RX ADMIN — PHENYLEPHRINE HYDROCHLORIDE 100 MCG: 10 INJECTION, SOLUTION INTRAMUSCULAR; INTRAVENOUS; SUBCUTANEOUS at 09:15

## 2018-07-30 ASSESSMENT — ACTIVITIES OF DAILY LIVING (ADL)
ADLS_ACUITY_SCORE: 22
COGNITION: 0 - NO COGNITION ISSUES REPORTED
ADLS_ACUITY_SCORE: 18

## 2018-07-30 ASSESSMENT — LIFESTYLE VARIABLES: TOBACCO_USE: 1

## 2018-07-30 NOTE — BRIEF OP NOTE
[facility]  Orthopedics Brief Operative Note    Pre-operative diagnosis: right hip avascular necrosis   Post-operative diagnosis Same   Procedure: Procedure(s):  Right total hip arthroplasty    Choice anesthesia - Wound Class: I-Clean   Surgeon: Bry Garcia MD   Assistants(s): Surgeon(s) and Role:     * Bry Garcia MD - Primary     * Donal Grover PA-C - Assisting   Anesthesia: Other    Estimated blood loss: 750 mL    Total IV fluids: See anesthesia record   Blood transfusion: None       Drains: None   Specimens: * No specimens in log *   Implants: Akbar/Nephew 54mm acetab, #14 HO ingrowth femur, 32mm+8 chrome head.   Findings: Dictated   Complications: None   Condition: Stable   Comments: See Dictated Operative report for full details

## 2018-07-30 NOTE — IP AVS SNAPSHOT
"          [facility]: [phone]                                              INTERAGENCY TRANSFER FORM - PHYSICIAN ORDERS   2018                    Hospital Admission Date: 2018  LUCIANO JONES   : 1945  Sex: Female        Attending Provider: Bry Garcia MD     Allergies:  No Known Drug Allergies    Infection:  None   Service:  SURGERY    Ht:  1.676 m (5' 6\")   Wt:  113.4 kg (250 lb)   Admission Wt:  --    BMI:  40.35 kg/m 2   BSA:  2.3 m 2            Patient PCP Information     Provider PCP Type    Manoj Daley PA-C General      ED Clinical Impression     Diagnosis Description Comment Added By Time Added    S/P hip replacement, right [Z96.641] S/P hip replacement, right [Z96.641]  Donal Grover PA-C 2018  9:39 AM    Other iron deficiency anemia [D50.8] Other iron deficiency anemia [D50.8]  Donal Grover PA-C 2018  9:42 AM    Constipation due to pain medication [K59.03] Constipation due to pain medication [K59.03]  Donal Grover PA-C 2018  9:43 AM    Venous thromboembolism (VTE) prophylaxis prescribed at discharge [Z79.01] Venous thromboembolism (VTE) prophylaxis prescribed at discharge [Z79.01]  Donal Grover PA-C 2018  9:45 AM      Hospital Problems as of 2018              Priority Class Noted POA    Hip osteoarthritis Medium  2018 Yes      Non-Hospital Problems as of 2018              Priority Class Noted    HTN (hypertension) Medium  2010    Hyperlipidemia LDL goal <160 Medium  2011    Abnormal glucose Medium  2011    Obesity Medium  2011    Leg weakness, bilateral Medium  2012    CRP elevated Medium  2012    Vitamin D deficiency Medium  2012    Health Care Home Medium  2012    HTN, goal below 140/90 Medium  2013    Constipation Medium  2014    Leg weakness Medium  3/24/2015    Constipation Medium  3/25/2015    Myalgia and myositis Medium  " 3/25/2015    Morbid obesity (H) Medium  9/22/2017    Avascular necrosis of bone of hip, right (H) Medium  7/28/2018      Code Status History     Date Active Date Inactive Code Status Order ID Comments User Context    This patient has a current code status but no historical code status.         Medication Review      START taking        Dose / Directions Comments    acetaminophen 325 MG tablet   Commonly known as:  TYLENOL   Used for:  S/P hip replacement, right        Dose:  975 mg   Take 3 tablets (975 mg) by mouth every 8 hours   Quantity:  100 tablet   Refills:  0        aspirin 325 MG tablet   Used for:  Venous thromboembolism (VTE) prophylaxis prescribed at discharge        Dose:  325 mg   Take 1 tablet (325 mg) by mouth daily   Quantity:  45 tablet   Refills:  0        bisacodyl 10 MG Suppository   Commonly known as:  DULCOLAX   Used for:  Constipation due to pain medication        Dose:  10 mg   Place 1 suppository (10 mg) rectally daily as needed for constipation   Quantity:  25 suppository   Refills:  1        ferrous sulfate 325 (65 Fe) MG tablet   Commonly known as:  IRON   Used for:  Other iron deficiency anemia        Dose:  325 mg   Take 1 tablet (325 mg) by mouth 2 times daily   Quantity:  100 tablet   Refills:  0        HYDROmorphone 2 MG tablet   Commonly known as:  DILAUDID   Used for:  S/P hip replacement, right        Dose:  2 mg   Take 1 tablet (2 mg) by mouth every 4 hours as needed for other (pain control or improvement in physical function. Hold dose for analgesic side effects.)   Quantity:  40 tablet   Refills:  0        polyethylene glycol Packet   Commonly known as:  MIRALAX/GLYCOLAX   Used for:  Constipation due to pain medication        Dose:  17 g   Take 17 g by mouth 2 times daily as needed (constipation)   Quantity:  7 packet   Refills:  0        senna-docusate 8.6-50 MG per tablet   Commonly known as:  SENOKOT-S;PERICOLACE   Used for:  Constipation due to pain medication        Dose:   1 tablet   Take 1 tablet by mouth 2 times daily as needed for constipation   Quantity:  100 tablet   Refills:  0          CONTINUE these medications which have NOT CHANGED        Dose / Directions Comments    gabapentin 300 MG capsule   Commonly known as:  NEURONTIN   Used for:  Weakness of both lower extremities, Bilateral leg pain        Dose:  300 mg   Take 1 capsule (300 mg) by mouth 2 times daily   Quantity:  180 capsule   Refills:  0          STOP taking     lisinopril 5 MG tablet   Commonly known as:  PRINIVIL/ZESTRIL                     Further instructions from your care team       POST OP TOTAL HIP INSTRUCTION:    Incision care:  To close the incision, staples will be used in most cases. The staples will be removed at the office in 10-14 days from the surgery. If you are going to a TCU (nursing home) from the hospital, the staples can be removed at the nursing home in that time frame. For first 3 days, place a water proof cover over the dressing for showers. If the dressings get wet, change it with new gauze and paper tape. The incision can be wet after 4 days from the surgery.  You can take a shower but do not soak the incision. The incision should be covered with a light gauze that is held by 2 inch paper tape until follow up.     If you have an aquacell dressing, (flesh colored rubber like bandage), you may leave this dressing in place until follow up in the clinic, unless; the dressing becomes completely saturated with blood, is leaking, gets water inside as evident by moisture appearing on the inside of the dressing, or you have a skin reaction.  In this case you should remove and use dressings like what is mentioned above.    Soft tissue: It is not unusual to have swelling in the leg (thigh down to the foot) up to 2 months from the surgery. Frequent ankle pumping, elevation of the leg above the heart level and gentle compression support with ace wrap should help with swelling. Icing regularly, for  20 minutes every hour, will also help with swelling and pain.  Due to soft tissue swelling, increased amount of redness around the incision site is also commonly seen. Progressively worsening redness along with increasing pain or increasing drainage from the wound should be a reason to alert us immediately. You may also experience bruising.  This may occur anywhere from your toes, through the leg and even onto your torso.  It may show up even 1 week after surgery.      Medication:You will be discharged from the hospital with pain medication(s). In most cases, consistent use the pain pills can be limited to a few days. After this period, the medication should be weaned off as soon as possible to avoid potential complications of constipation, nausea, or rash, etc. Until you can be completely off the pain pills, using them only at nighttime along with controlling the pain with over-the-counter medication(s) such as Ibuprofen/Naproxen and/or Tylenol during the day would be a good way of managing the pain.       In order to minimize the potential problem of blood clot, you'll be asked to take aspirin (low risk patients) or undergo a period of blood thinner injections. If you were on Coumadin before the surgery you will be going back to it after the surgery.    It is also recommend you take a stool softener while taking opioid prescription pain mediation to avoid constipation.  Also stay well hydrated in general during this period.    Activities:  One of the main problems related to the hip replacement is that there is 1-3% chance for dislocation. This means the ball comes out of the socket.  This is due to the fact that the size of the femoral head is going to be smaller than the native femoral head and also due to the fact that the hip capsule which supports the joint is disrupted and repaired to perform the surgery.  As a result, there is a lack of a protecting barrier until it is re-established with healing of the  capsule, which takes a minimum of 4 months.  For this reason, you are asked to avoid bending your hip greater than 90 degrees of flexion for the first 4 months. This also means avoiding sitting on a very plush chair/couch and not crossing the legs.    As long as there is no fracture complication, you can put all the weight on the operated leg. Walker/crutches can be discontinued according to your own progress. The general guideline is to discontinue the walker/crutches when you feel fairly comfortable and confident with your balance. You can return to walking, swimming, golfing, double tennis but jogging or running are not recommended.     You can lie on either side of your body as soon as you feel comfortable putting pressure on the incision. We recommend putting one or 2 pillows in between the legs when you lie on that side.     Please do not undergo procedures such as dental cleaning, oral surgery, colonoscopy as well as any type of surgery that involves a significant incision for 4 months after surgery.  In case of an emergency, you will need an antibiotic, usually amoxicillin or clindamycin is used for this purpose.  You'll be taking the medication one hour before the procedure.  Please let the provider know that you have artificial implants in your body    Also, for the rest of your life, some providers recommend that you take an antibiotic for procedures such as dental cleaning, oral surgery, colonoscopy as well as any type of surgery that involves a significant incision.  Please check with that provider.    Donal Grover PA-C  [facility] - Surgery  [facility]  [address] [place]  [address]  [phone]          Summary of Visit     Reason for your hospital stay       Right total hip arthroplasty             After Care     Activity - Up with assistive device           Activity - Up with nursing assistance           Advance Diet as Tolerated       Follow  this diet upon discharge: Advance to a regular diet as tolerated.  \Increase water and fiber intake while on pain medication.       General info for SNF       Length of Stay Estimate: Short Term Care: Estimated # of Days <30  Condition at Discharge: Stable  Level of care:skilled   Rehabilitation Potential: Fair  Admission H&P remains valid and up-to-date: Yes  Recent Chemotherapy: N/A  Use Nursing Home Standing Orders: Yes       Hip precautions           Intake and output       Every shift       Mantoux instructions       Give two-step Mantoux (PPD) Per Facility Policy Yes       Weight bearing status       wbat       Wound care (specify)       Site:   Right hip.  Instructions:  Daily checks, change as needed.  Showering no soaking.  Staples removed at 14 days post op if appropriate.             Referrals     Occupational Therapy Adult Consult       Evaluate and treat as clinically indicated.    Reason:  S/P R JENAE       Physical Therapy Adult Consult       Evaluate and treat as clinically indicated.    Reason:  S/p right JENAE             Your next 10 appointments already scheduled     Aug 09, 2018 11:20 AM CDT   Return Visit with Donal Grover PA-C   [facility]THOPEDIC SURGERY ([facility])    [address] [address] [address]   [phone]              Follow-Up Appointment Instructions     Future Labs/Procedures    Follow Up and recommended labs and tests     Comments:    Follow up with me in 10-14  Days if transport appropriate.    CBC on 08/06/2018 result yo TCU provider.    Donal Grover PA-C  [facility] - Surgery  [facility]  [address] [facility]  [place]  [address]  [phone]  [phone] fax  [phone] for scheduling  .      Follow-Up Appointment Instructions     Follow Up and recommended labs and tests       Follow up with me in 10-14  Days if transport appropriate.    CBC on 08/06/2018 result yo TCU  provider.    Donal Grover PA-C  [facility] - Surgery  [facility]  [address] [place]  [address]  [phone] fax  [phone] for scheduling  .             Statement of Approval     Ordered          08/02/18 1215  I have reviewed and agree with all the recommendations and orders detailed in this document.  EFFECTIVE NOW     Approved and electronically signed by:  Donal Grover PA-C

## 2018-07-30 NOTE — PLAN OF CARE
Problem: Patient Care Overview  Goal: Plan of Care/Patient Progress Review  Pt arrived to the floor 13:30 .  pts cms intact. Pt has a snyder. Pt complains of pain . Pt has ice applied and IV dilaudid given for pain .  Pt was settled and post op vitals started.

## 2018-07-30 NOTE — ANESTHESIA PREPROCEDURE EVALUATION
Anesthesia Evaluation     . Pt has had prior anesthetic.     No history of anesthetic complications          ROS/MED HX    ENT/Pulmonary:     (+)tobacco use, Past use , . .    Neurologic:     (+)other neuro b/l leg and UE weakness    Cardiovascular:     (+) Dyslipidemia, hypertension----. : . . . :. .       METS/Exercise Tolerance:     Hematologic:         Musculoskeletal:   (+) arthritis, , , other musculoskeletal- myositis, b/l carpal tunnel      GI/Hepatic:         Renal/Genitourinary:         Endo:     (+) Obesity, .      Psychiatric:         Infectious Disease:   (+) Other Infectious Disease h/o lyme dz      Malignancy:         Other:    (+) H/O Chronic Pain,                   Physical Exam  Normal systems: cardiovascular and pulmonary    Airway   Mallampati: II  TM distance: >3 FB  Neck ROM: full    Dental     Cardiovascular       Pulmonary                     Anesthesia Plan      History & Physical Review  History and physical reviewed and following examination; no interval change.    ASA Status:  3 .    NPO Status:  > 8 hours    Plan for General and ETT with Intravenous and Propofol induction. Maintenance will be Balanced.    PONV prophylaxis:  Ondansetron (or other 5HT-3) and Dexamethasone or Solumedrol       Postoperative Care  Postoperative pain management:  IV analgesics.      Consents  Anesthetic plan, risks, benefits and alternatives discussed with:  Patient.  Use of blood products discussed: Yes.   Use of blood products discussed with Patient.  Consented to blood products.  .                          .

## 2018-07-30 NOTE — ANESTHESIA POSTPROCEDURE EVALUATION
Patient: Cristy Bailey    Procedure(s):  Right total hip arthroplasty    Choice anesthesia - Wound Class: I-Clean    Diagnosis:right hip avascular necrosis  Diagnosis Additional Information: Avascular necrosis   Dr Garcia    Anesthesia Type:  General, ETT    Note:  Anesthesia Post Evaluation    Patient location during evaluation: PACU  Patient participation: Able to fully participate in evaluation  Level of consciousness: awake  Pain management: adequate  Airway patency: patent  Cardiovascular status: acceptable  Respiratory status: acceptable  Hydration status: acceptable  PONV: none     Anesthetic complications: None          Last vitals:  Vitals:    07/30/18 1130 07/30/18 1143 07/30/18 1145   BP: 102/68 (!) 105/97 99/61   Resp: 14 (!) 38 22   Temp:   97  F (36.1  C)   SpO2: 99% 100% 100%         Electronically Signed By: Yousuf Bettencourt MD  July 30, 2018  12:13 PM

## 2018-07-30 NOTE — ANESTHESIA CARE TRANSFER NOTE
Patient: Cristy Bailey    Procedure(s):  Right total hip arthroplasty    Choice anesthesia - Wound Class: I-Clean    Diagnosis: right hip avascular necrosis  Diagnosis Additional Information: No value filed.    Anesthesia Type:   General, ETT     Note:  Airway :Face Mask  Patient transferred to:PACU  Comments: Did wellHandoff Report: Identifed the Patient, Identified the Reponsible Provider, Reviewed the pertinent medical history, Discussed the surgical course, Reviewed Intra-OP anesthesia mangement and issues during anesthesia, Set expectations for post-procedure period and Allowed opportunity for questions and acknowledgement of understanding      Vitals: (Last set prior to Anesthesia Care Transfer)    [facility] VITALS  7/30/2018 0943 - 7/30/2018 1019      7/30/2018             Temp: (!)  65.8  F (18.8  C)                Electronically Signed By: ISRAEL Barth CRNA  July 30, 2018  10:19 AM

## 2018-07-30 NOTE — PROGRESS NOTES
Xray unable to complete second view for right hip as patient uncooperative and physically unable to elevate left leg.  Xrnancy tech stated she would be contacting Dr. Garcia regarding inability to complete view.

## 2018-07-30 NOTE — IP AVS SNAPSHOT
` `     [facility] ORTHO SPINE: [phone]            Medication Administration Report for Cristy Bailey as of 18 1238   Legend:    Given Hold Not Given Due Canceled Entry Other Actions    Time Time (Time) Time  Time-Action       Inactive    Active    Linked        Medications 18    acetaminophen (TYLENOL) tablet 650 mg  Dose: 650 mg  Freq: EVERY 4 HOURS PRN Route: PO  PRN Reason: other  PRN Comment: multimodal surgical pain management along with NSAIDS and opioid medication as indicated based on pain control and physical function.  Start: 18 1000   Admin Instructions: May give first dose 4 hours after last scheduled dose of acetaminophen.  Maximum acetaminophen dose from all sources = 75 mg/kg/day not to exceed 4 grams/day.    Admin. Amount: 2 tablet (2 × 325 mg tablet)  Dispense Loc:  ADS MS6E  POC: Post-procedure               acetaminophen (TYLENOL) tablet 975 mg  Dose: 975 mg  Freq: EVERY 8 HOURS Route: PO  Start: 18 1400   End: 18 1359   Admin Instructions: Do not use if patient has an active opioid/acetaminophen combined analgesic product ordered for pain.  Maximum acetaminophen dose from all sources = 75 mg/kg/day not to exceed 4 grams/day.    Admin. Amount: 3 tablet (3 × 325 mg tablet)  Last Admin: 18 0635  Dispense Loc: [facility] ADS MS6E  Administrations Remainin  POC: Post-procedure        1546 (975 mg)-Given       2121 (975 mg)-Given        0617 (975 mg)-Given       1335 (975 mg)-Given       (2232)-Not Given        0548 (975 mg)-Given       1453 (975 mg)-Given       2212 (975 mg)-Given        0635 (975 mg)-Given       1359-Med Discontinued       benzocaine-menthol (CHLORASEPTIC) 6-10 MG lozenge 1-2 lozenge  Dose: 1-2 lozenge  Freq: EVERY 1 HOUR PRN Route: BU  PRN Reason: sore throat  PRN Comment: sore throat without fever  Start: 18 1338   Admin. Amount: 1-2 lozenge  Last Admin: 18  0437  Dispense Loc: [facility]S MS6E  POC: Post-procedure          0437 (1 lozenge)-Given            enoxaparin (LOVENOX) injection 40 mg  Dose: 40 mg  Freq: EVERY 24 HOURS Route: SC  Start: 07/31/18 0600   Admin Instructions: Check to make sure start date/time is 12-24 hours post op unless documented complication, AND no sooner than 22 hours post op if spinal anesthesia used.   Continue until discharge to home. HOLD if platelet count falls below 50% of baseline or less than 100,000/ L and notify provider.    Admin. Amount: 40 mg = 0.4 mL Conc: 40 mg/0.4 mL  Last Admin: 08/02/18 0636  Dispense Loc: [facility] ADS MS6E  Volume: 0.4 mL  POC: Post-procedure         0617 (40 mg)-Given        0548 (40 mg)-Given        0636 (40 mg)-Given           ferrous sulfate (IRON) tablet 325 mg  Dose: 325 mg  Freq: 2 TIMES DAILY Route: PO  Start: 08/01/18 2000   Admin. Amount: 1 tablet (1 × 325 mg tablet)  Last Admin: 08/02/18 0825  Dispense Loc: [facility] ADS MS6E          2042 (325 mg)-Given        0825 (325 mg)-Given       [ ] 2000           gabapentin (NEURONTIN) capsule 300 mg  Dose: 300 mg  Freq: 2 TIMES DAILY Route: PO  Start: 07/30/18 2000   Admin. Amount: 1 capsule (1 × 300 mg capsule)  Last Admin: 08/02/18 0826  Dispense Loc: [facility]S MS6E        2037 (300 mg)-Given        0837 (300 mg)-Given       2036 (300 mg)-Given        0806 (300 mg)-Given       2041 (300 mg)-Given        0826 (300 mg)-Given       [ ] 2000           HYDROmorphone (DILAUDID) tablet 2-4 mg  Dose: 2-4 mg  Freq: EVERY 3 HOURS PRN Route: PO  PRN Reason: other  PRN Comment: pain control or improvement in physical function. Hold dose for analgesic side effects.  Start: 07/30/18 1338   Admin Instructions: Start with the lowest dose.  May adjust dose by 2 mg every 3 hours as needed. Notify provider to assess for uncontrolled pain or analgesic side effects.  Hold while on PCA or with regular IV opioid dosing. Maximum total is 32 mg in 24 hours.    Admin. Amount: 1-2 tablet (1-2 × 2  mg tablet)  Last Admin: 08/02/18 0500  Dispense Loc: [facility] ADS MS6E  POC: Post-procedure        1546 (2 mg)-Given       2036 (4 mg)-Given [C]        0033 (4 mg)-Given       0434 (4 mg)-Given       0836 (4 mg)-Given        0436 (2 mg)-Given       2041 (2 mg)-Given       2211 (2 mg)-Given        0500 (2 mg)-Given           HYDROmorphone (PF) (DILAUDID) injection 0.5-1 mg  Dose: 0.5-1 mg  Freq: EVERY 2 HOURS PRN Route: IV  PRN Reason: other  PRN Comment: pain control or improvement in physical function Hold dose for analgesic side effects.  Start: 07/30/18 1338   Admin Instructions: Start at the lowest dose.  May adjust dose by 0.25 mg every 2 hours as needed.  Notify provider to assesss for uncontrolled pain or  analgesic side effects.   Hold while on IV PCA or with regular IV opioid dosing.  For ordered doses up to 4 mg give IV Push undiluted. Administer each 2mg over 2-5 minutes.    Admin. Amount: 0.5-1 mg  Last Admin: 07/30/18 1433  Dispense Loc: [facility]S MS6E  POC: Post-procedure        1433 (0.5 mg)-Given              hydrOXYzine (ATARAX) tablet 10 mg  Dose: 10 mg  Freq: EVERY 6 HOURS PRN Route: PO  PRN Reason: itching  Start: 07/30/18 1338   Admin Instructions: Caution to be used when administering multiple CNS depressing meds within a short time frame.    Admin. Amount: 1 tablet (1 × 10 mg tablet)  Last Admin: 08/01/18 2041  Dispense Loc: [facility] ADS MS6E  POC: Post-procedure        1547 (10 mg)-Given         2041 (10 mg)-Given            lactated ringers infusion  Rate: 25 mL/hr   Freq: CONTINUOUS Route: IV  Last Dose: Stopped (07/30/18 1000)  Start: 07/30/18 0615   Admin Instructions: IF patient NOT on dialysis.    Last Admin: 07/30/18 0930  Dispense Loc: [facility] Floor Stock  Volume: 1,000 mL        0737 ( )-New Bag       0830 ( )-New Bag       0930 ( )-New Bag       1000-Stopped              lidocaine (LMX4) kit  Freq: EVERY 1 HOUR PRN Route: Top  PRN Reason: pain  PRN Comment: with VAD insertion or accessing implanted  "port.  Start: 07/30/18 1338   Admin Instructions: Do NOT give if patient has a history of allergy to any local anesthetic or any \"everardo\" product.   Apply 30 minutes prior to VAD insertion or port access.  MAX Dose:  2.5 g (  of 5 g tube)    Dispense Loc:  Main Pharmacy  POC: Post-procedure               lidocaine 1 % 1 mL  Dose: 1 mL  Freq: EVERY 1 HOUR PRN Route: OTHER  PRN Comment: mild pain with VAD insertion or accessing implanted port  Start: 07/30/18 0607   Admin Instructions: Do NOT give if patient has a history of allergy to any local anesthetic or any \"everardo\" product. MAX dose 1 mL subcutaneous OR intradermal in divided doses.    Admin. Amount: 1 mL  Dispense Loc: [facility] Stock  Volume: 2 mL               naloxone (NARCAN) injection 0.1-0.4 mg  Dose: 0.1-0.4 mg  Freq: EVERY 2 MIN PRN Route: IV  PRN Reason: opioid reversal  Start: 07/30/18 1338   Admin Instructions: For respiratory rate LESS than or EQUAL to 8.  Partial reversal dose:  0.1 mg titrated q 2 minutes for Analgesia Side Effects Monitoring Sedation Level of 3 (frequently drowsy, arousable, drifts to sleep during conversation).Full reversal dose:  0.4 mg bolus for Analgesia Side Effects Monitoring Sedation Level of 4 (somnolent, minimal or no response to stimulation).  For ordered doses up to 2mg give IVP. Give each 0.4mg over 15 seconds in emergency situations. For non-emergent situations further dilute in 9mL of NS to facilitate titration of response.    Admin. Amount: 0.1-0.4 mg = 0.25-1 mL Conc: 0.4 mg/mL  Dispense Loc:  ADS MS6E  Volume: 1 mL  POC: Post-procedure               ondansetron (ZOFRAN-ODT) ODT tab 4 mg  Dose: 4 mg  Freq: EVERY 6 HOURS PRN Route: PO  PRN Reasons: nausea,vomiting  Start: 07/30/18 1338   Admin Instructions: This is Step 1 of nausea and vomiting management.  If nausea not resolved in 15 minutes, go to Step 2 prochlorperazine (COMPAZINE). Do not push through foil backing. Peel back foil and gently remove. Place on " tongue immediately. Administration with liquid unnecessary  With dry hands, peel back foil backing and gently remove tablet; do not push oral disintegrating tablet through foil backing; administer immediately on tongue and oral disintegrating tablet dissolves in seconds; then swallow with saliva; liquid not required.    Admin. Amount: 1 tablet (1 × 4 mg tablet)  Dispense Loc: [facility] MS6E  POC: Post-procedure              Or  ondansetron (ZOFRAN) injection 4 mg  Dose: 4 mg  Freq: EVERY 6 HOURS PRN Route: IV  PRN Reasons: nausea,vomiting  Start: 07/30/18 1338   Admin Instructions: This is Step 1 of nausea and vomiting management.  If nausea not resolved in 15 minutes, go to Step 2 prochlorperazine (COMPAZINE).  Irritant. For ordered doses up to 4 mg, give IV Push undiluted over 2-5 minutes.    Admin. Amount: 4 mg = 2 mL Conc: 4 mg/2 mL  Dispense Loc: [facility]S MS6E  Infused Over: 2-5 Minutes  Volume: 2 mL  POC: Post-procedure               polyethylene glycol (MIRALAX/GLYCOLAX) Packet 17 g  Dose: 17 g  Freq: 2 TIMES DAILY PRN Route: PO  PRN Comment: constipation  Start: 08/01/18 2487   Admin Instructions: 1 Packet = 17 grams. Mixed prescribed dose in 8 ounces of water. Follow with 8 oz. of water.    Admin. Amount: 17 g  Dispense Loc: [facility]S MS6E               senna-docusate (SENOKOT-S;PERICOLACE) 8.6-50 MG per tablet 1 tablet  Dose: 1 tablet  Freq: 2 TIMES DAILY Route: PO  Start: 07/30/18 1338   Admin Instructions: If no bowel movement in 24 hours, increase to 2 tablets PO.  Hold for loose stools.    Admin. Amount: 1 tablet  Last Admin: 08/02/18 0826  Dispense Loc: [facility] MS6E  POC: Post-procedure                0837 (1 tablet)-Given       2036 (1 tablet)-Given        0806 (1 tablet)-Given       (2154)-Not Given        0826 (1 tablet)-Given       [ ] 2000          Or  senna-docusate (SENOKOT-S;PERICOLACE) 8.6-50 MG per tablet 2 tablet  Dose: 2 tablet  Freq: 2 TIMES DAILY Route: PO  Start: 07/30/18 1338   Admin  Instructions: Hold for loose stools.    Admin. Amount: 2 tablet  Last Admin: 07/30/18 2037  Dispense Loc:  ADS MS6E  POC: Post-procedure        2037 (2 tablet)-Given                                             [ ] 2000           sodium chloride (PF) 0.9% PF flush 3 mL  Dose: 3 mL  Freq: EVERY 8 HOURS Route: IK  Start: 07/30/18 1345   Admin Instructions: And Q1H PRN, to lock peripheral IV dormant line.    Admin. Amount: 3 mL  Last Admin: 08/02/18 0636  Dispense Loc: [facility] Floor Stock  Volume: 4 mL  POC: Post-procedure   Current Line: Peripheral IV 06/20/18 Right Hand       (1422)-Not Given       (1702)-Not Given        (0041)-Not Given       1335 (3 mL)-Given       (1638)-Not Given        (0001)-Not Given [C]       0806 (3 mL)-Given       1743 (3 mL)-Given        0636 (3 mL)-Given       (0826)-Not Given [C]       [ ] 1600           sodium chloride (PF) 0.9% PF flush 3 mL  Dose: 3 mL  Freq: EVERY 1 HOUR PRN Route: IK  PRN Reason: line flush  PRN Comment: for peripheral IV flush post IV meds  Start: 07/30/18 1338   Admin. Amount: 3 mL  Dispense Loc: [facility] Floor Stock  Volume: 4 mL  POC: Post-procedure   Current Line: Peripheral IV 06/20/18 Right Hand         (0557)-Not Given [C]            sodium chloride (PF) 0.9% PF flush 3 mL  Dose: 3 mL  Freq: EVERY 1 HOUR PRN Route: IK  PRN Reason: line flush  PRN Comment: for peripheral IV flush post IV meds  Start: 07/30/18 0607   Admin. Amount: 3 mL  Last Admin: 08/01/18 0549  Dispense Loc: [facility] Floor Stock  Volume: 4 mL   Current Line: Peripheral IV 06/20/18 Right Hand         0549 (3 mL)-Given            sodium chloride 0.9% infusion  Rate: 100 mL/hr   Freq: CONTINUOUS Route: IV  Start: 07/30/18 1345   Admin Instructions: Change to saline lock when PO well tolerated.    Last Admin: 07/30/18 1547  Dispense Loc: [facility] Floor Stock  Volume: 1,000 mL  POC: Post-procedure   Current Line: Peripheral IV 07/30/18 Right Hand       1547 ( )-New Bag              traZODone (DESYREL) half-tab  25 mg  Dose: 25 mg  Freq: AT BEDTIME PRN Route: PO  PRN Reason: sleep  Start: 18   Admin Instructions: POD 1.  May repeat x 1. Do not give unless at least 6 hours of uninterrupted sleep is expected.    Admin. Amount: 1 half-tab (1 × 25 mg half-tab)  Dispense Loc: [facility] MS6E  POC: Post-procedure              Completed Medications  Medications 18         Dose: 500 mL  Freq: ONCE Route: IV  Last Dose: 500 mL (18)  Start: 18   End: 18   Admin. Amount: 500 mL  Last Admin: 18  Dispense Loc: [facility] Floor Stock  Administrations Remainin  Volume: 500 mL   Current Line: Peripheral IV 18 Right Hand        1705 (500 mL)-New Bag               Dose: 2 g  Freq: EVERY 8 HOURS Route: IV  Indications of Use: PERIOPERATIVE PHARMACOPROPHYLAXIS  Start: 18 1600   End: 18 0104   Admin Instructions: First post-op dose due 8 hours after intra-op dose, see eMAR.      Admin. Amount: 2 g = 100 mL Conc: 2 g/100 mL  Last Admin: 18  Dispense Loc:  Main Pharmacy  Infused Over: 30 Minutes  Administrations Remainin  Volume: 100 mL  POC: Post-procedure   Current Line: Peripheral IV 18 Right Hand       1547 (2 g)-Given        0034 (2 g)-Given               Dose: 15 mg  Freq: EVERY 6 HOURS Route: IV  Start: 18 1800   End: 18 1335   Admin Instructions: Age greater than or equal to 65 years AND CrCl greater than 50 mL/minute.  May continue use for up to 5 days MAX if order renewed.  IF celecoxib (CELEBREX) was given pre-operatively, start ketorolac (TORADOL) 12 hours after celecoxib (CELEBREX) given.  Can cause pain on injection. Administer through a running maintenance fluid over 1 minute followed by a flush. If patient complains of pain on injection, may dilute 15-30 mg in 5 mL and push over 1 to 2 minutes.    Admin. Amount: 15 mg = 1 mL Conc: 15 mg/mL  Last Admin: 18  1335  Dispense Loc:  ADS MS6E  Administrations Remainin  Volume: 1 mL  POC: Post-procedure   Current Line: Peripheral IV 18 Right Hand       1849 (15 mg)-Given        0044 (15 mg)-Given       0618 (15 mg)-Given       1335 (15 mg)-Given            Discontinued Medications  Medications 18         Dose: 1 g  Freq: SEE ADMIN INSTRUCTIONS Route: IV  Indications of Use: PERIOPERATIVE PHARMACOPROPHYLAXIS  Start: 18 0734   End: 18 1521   Admin Instructions: Intra-Op Dose.  Give every 2 hours while patient in surgery, starting 2 hours after pre-op dose.  DO NOT GIVE intra-op dose if CrCl less than 10 mL/min (on dialysis).  If CrCL less than 50 mL/min, double the time interval between doses.    Admin. Amount: 1 g  Dispense Loc:  Main Pharmacy  Infused Over: 30 Minutes        1521-Med Discontinued            Dose: 1 g  Freq: SEE ADMIN INSTRUCTIONS Route: IV  Indications of Use: PERIOPERATIVE PHARMACOPROPHYLAXIS  Start: 18 0603   End: 18 1521   Admin Instructions: Intra-Op Dose.  Give every 2 hours while patient in surgery, starting 2 hours after pre-op dose.  DO NOT GIVE intra-op dose if CrCl less than 10 mL/min (on dialysis).  If CrCL less than 50 mL/min, double the time interval between doses.    Admin. Amount: 1 g  Dispense Loc:  Main Pharmacy  Infused Over: 30 Minutes        1521-Med Discontinued            Dose: 25-50 mcg  Freq: EVERY 2 MIN PRN Route: IV  PRN Reason: other  PRN Comment: acute pain  Start: 18 1014   End: 18 1313   Admin Instructions: MAX cumulative dose = 250 mcg.   Use fentaNYL (SUBLIMAZE) initially, as a short acting agent for acute pain control. If insufficient, or a longer acting agent is needed, begin morphine or HYDROmorphone (DILAUDID) if ordered.  For ordered doses up to 100 mcg give IV Push undiluted over a minimum of 3-5 minutes.    Admin. Amount: 25-50 mcg = 0.5-1 mL Conc: 50  mcg/mL  Last Admin: 07/30/18 1249  Dispense Loc: [facility] ADS OR  Volume: 2 mL  POC: PACU   Current Line: Peripheral IV 07/30/18 Right Hand       1105 (25 mcg)-Given       1119 (25 mcg)-Given       1127 (25 mcg)-Given       1129 (25 mcg)-Given       1219 (25 mcg)-Given       1249 (25 mcg)-Given       1313-Med Discontinued            Dose: 2.5-5 mg  Freq: EVERY 10 MIN PRN Route: IV  PRN Reason: high blood pressure  PRN Comment: for Systolic Blood Pressure greater than 160 mmHg and Heart Rate less than 60 bpm.  Start: 07/30/18 1014   End: 07/30/18 1313   Admin Instructions: Max cumulative dose = 20 mg.  FOR USE IN PACU ONLY, DC WHEN TRANSFERRED TO FLOOR.  For ordered doses up to 40 mg, give IV Push undiluted over 1 minute.    Admin. Amount: 2.5-5 mg = 0.125-0.25 mL Conc: 20 mg/mL  Dispense Loc: [facility] ADS ANES-MED   Infused Over: 1 Minutes  Volume: 0.25 mL  POC: PACU        1313-Med Discontinued            Dose: 0.3-0.5 mg  Freq: EVERY 5 MIN PRN Route: IV  PRN Reason: other  PRN Comment: acute pain.  May administer if Respiratory Rate is greater than 10  Start: 07/30/18 1014   End: 07/30/18 1313   Admin Instructions: Max cumulative dose = 2 mg  If fentaNYL (SUBLIMAZE) is also ordered, use HYDROmorphone (DILAUDID) if pain control insufficient with fentaNYL (SUBLIMAZE) or a longer acting agent is needed.  For ordered doses up to 4 mg give IV Push undiluted. Administer each 2mg over 2-5 minutes.    Admin. Amount: 0.3-0.5 mg  Last Admin: 07/30/18 1138  Dispense Loc: [facility] ADS ANES02  POC: PACU   Current Line: Peripheral IV 07/30/18 Right Hand       1138 (0.3 mg)-Given       1313-Med Discontinued            Dose: 30 mg  Freq: EVERY 6 HOURS PRN Route: IV  PRN Reason: moderate pain  Start: 07/30/18 1150   End: 07/30/18 1313   Admin Instructions: Can cause pain on injection. Administer through a running maintenance fluid over 1 minute followed by a flush. If patient complains of pain on injection, may dilute 15-30 mg in 5 mL and push over  "1 to 2 minutes.    Admin. Amount: 30 mg = 1 mL Conc: 30 mg/mL  Last Admin: 18 1151  Dispense Loc: [facility] ADS PACU  Infused Over: 2 Minutes  Volume: 1 mL  POC: PACU/Phase II   Current Line: Peripheral IV 18 Right Hand       1151 (30 mg)-Given       1313-Med Discontinued            Dose: 10 mg  Freq: ONCE PRN Route: IV  PRN Reason: high blood pressure  PRN Comment: for Systemic Blood Pressure greater than 160 mmHg and Heart Rate greater than 60 bpm.    Start: 18 1014   End: 18   Admin Instructions: For PACU USE ONLY.  DC WHEN TRANSFERRED TO FLOOR.  For ordered doses up to 80 mg, give IV Push undiluted. Give each 20 mg over 2 minutes.    Admin. Amount: 10 mg = 2 mL Conc: 5 mg/mL  Dispense Loc: [facility] ADS ANES02  Infused Over: 2-8 Minutes  Administrations Remainin  Volume: 20 mL  POC: PACU        1313-Med Discontinued            Rate: 100 mL/hr   Freq: CONTINUOUS Route: IV  Start: 18 1015   End: 18   Admin Instructions: Continue until IV catheter is weaned    Last Admin: 18 1048  Dispense Loc: [facility] Floor Stock  Volume: 1,000 mL  POC: PACU   Current Line: Peripheral IV 18 Right Hand       1048 ( )-New Bag       1313-Med Discontinued            Freq: EVERY 1 HOUR PRN Route: Top  PRN Reason: pain  PRN Comment: with VAD insertion or accessing implanted port.  Start: 18 0607   End: 18 152   Admin Instructions: Do NOT give if patient has a history of allergy to any local anesthetic or any \"everardo\" product.   Apply 30 minutes prior to VAD insertion or port access.  MAX Dose:  2.5 g (  of 5 g tube)    Dispense Loc:  Main Pharmacy        1521-Med Discontinued            Dose: 1 mL  Freq: EVERY 1 HOUR PRN Route: OTHER  PRN Comment: mild pain with VAD insertion or accessing implanted port  Start: 18 1338   End: 18 152   Admin Instructions: Do NOT give if patient has a history of allergy to any local anesthetic or any \"everardo\" product. MAX dose 1 mL " subcutaneous OR intradermal in divided doses.    Admin. Amount: 1 mL  Dispense Loc: [facility] Floor Stock  Volume: 2 mL  POC: Post-procedure        1521-Med Discontinued            Dose: 5 mg  Freq: DAILY Route: PO  Start: 07/30/18 1400   End: 07/31/18 1444   Admin Instructions: Hold for SBP<115    Admin. Amount: 1 tablet (1 × 5 mg tablet)  Dispense Loc:  ADS MS6E        (1852)-Not Given        (0826)-Not Given       1444-Med Discontinued           Dose: 0.1-0.4 mg  Freq: EVERY 2 MIN PRN Route: IV  PRN Reason: opioid reversal  Start: 07/30/18 1338   End: 07/31/18 1337   Admin Instructions: For apnea or imminent respiratory arrest: give 0.4 mg IV undiluted Q 2 minutes PRN until desired degree of reversal is obtained, stop opioid and notify provider. Continue monitoring until discharge criteria are met for a minimum of 2 hours.  For severe sedation, decrease in respiratory depth, quality or respiratory rate less than 8: give 0.1 mg IV Q 2 minutes x 3 doses, stop opioid and notify provider.  Try to minimize reversal of analgesia especially in end-of-life patients  For ordered doses up to 2mg give IVP. Give each 0.4mg over 15 seconds in emergency situations. For non-emergent situations further dilute in 9mL of NS to facilitate titration of response.    Admin. Amount: 0.1-0.4 mg = 0.25-1 mL Conc: 0.4 mg/mL  Dispense Loc:  ADS MS6E  Volume: 1 mL  POC: Post-procedure         1337-Med Discontinued           Dose: 4 mg  Freq: EVERY 30 MIN PRN Route: PO  PRN Reason: nausea  Start: 07/30/18 1014   End: 07/30/18 1313   Admin Instructions: MAX total dose = 8 mg, including OR dosing. If not resolved in 15 minutes, then go to step 2 [prochlorperazine (COMPAZINE), if ordered].  With dry hands, peel back foil backing and gently remove tablet; do not push oral disintegrating tablet through foil backing; administer immediately on tongue and oral disintegrating tablet dissolves in seconds; then swallow with saliva; liquid not required.     Admin. Amount: 1 tablet (1 × 4 mg tablet)  Dispense Loc: [facility] ADS OR-B  Administrations Remainin  POC: PACU        1313-Med Discontinued         Or    Dose: 4 mg  Freq: EVERY 30 MIN PRN Route: IV  PRN Reason: nausea  Start: 18   End: 18   Admin Instructions: MAX total dose = 8 mg, including OR dosing. If not resolved in 15 minutes, then go to step 2 [prochlorperazine (COMPAZINE), if ordered].  Irritant. For ordered doses up to 4 mg, give IV Push undiluted over 2-5 minutes.    Admin. Amount: 4 mg = 2 mL Conc: 4 mg/2 mL  Dispense Loc: [facility] ADS OR-B  Infused Over: 2-5 Minutes  Administrations Remainin  Volume: 2 mL  POC: PACU        1313-Med Discontinued            Dose: 5 mg  Freq: EVERY 6 HOURS PRN Route: IV  PRN Reasons: nausea,vomiting  Start: 18   End: 18   Admin Instructions: This is Step 2 of the nausea and vomiting protocol.   If nausea not resolved in 15 minutes, give metoclopramide (REGLAN) if ordered (step 3 of nausea and vomiting protocol)  For ordered doses up to 10 mg, give IV Push undiluted. Each 5mg over 1 minute.    Admin. Amount: 5 mg = 1 mL Conc: 5 mg/mL  Dispense Loc: [facility] ADS PACU  Infused Over: 1-2 Minutes  Volume: 1 mL  POC: PACU        1313-Med Discontinued            Dose: 3 mL  Freq: EVERY 8 HOURS Route: IK  Start: 18 0615   End: 18 0838   Admin Instructions: And Q1H PRN, to lock peripheral IV dormant line.    Admin. Amount: 3 mL  Dispense Loc: [facility] Floor Stock  Volume: 4 mL   Current Line: Peripheral IV 18 Right Hand              (1703)-Not Given        (0041)-Not Given       0838-Med Discontinued                 Freq: PRN  Start: 18   End: 18   Last Admin: 18  POC: Intra-procedure        0846 (2,000 mL)-Given       1313-Med Discontinued            Freq: PRN  Start: 18   End: 07/30/18 1313   Last Admin: 18 0846  POC: Intra-procedure        0846 (1,000 mL)-Given       1313-Med  Discontinued       Medications 07/27/18 07/28/18 07/29/18 07/30/18 07/31/18 08/01/18 08/02/18

## 2018-07-30 NOTE — PROVIDER NOTIFICATION
Upon arrival to PACU, patient was restless and uncooperative.  Patient stating she was uncomfortable, nauseated and needed to sit up.  Patient HOB elevated, provided emesis basin and attempted to comfort and redirect.  Patient's BP hypotensive (60/40's) for several readings.  IVF opened wide and attempted to lower patient's HOB but patient adamant HOB stay elevated for her back pain.  Dr. Bettencourt paged to evaluate patient at bedside.  MD stated to obtain manual BP (82/60), to finish current bag of fluid and to attempt to lower HOB).  MD also stated to give Versed 1 mg IV and may repeat if necessary.  Currently patient is resting comfortably with BP's consistently 90/40's.

## 2018-07-30 NOTE — CONSULTS
[facility]    Hospitalist Consultation    Date of Admission:  7/30/2018  Date of Consult (When I saw the patient): 07/30/18    Assessment & Plan   Cristy Bailey is a 73 year old female patient with past medical history of hypertension, osteoarthritis, was admitted for elective surgery. She underwent right total hip arthroplasty.    1. S/P right total hip arthroplasty: Patient underwent surgery today. Continue pain medications per orthopedic surgery    2. Hypertension: BP running on the low side. Will have parameter for lisinopril. Will continue IV fluid until BP improves.     DVT Prophylaxis: Pneumatic Compression Devices    Code Status: Full Code    Disposition: Expected discharge in 1-3 days    Ana María Nieto MD    Reason for Consult   Reason for consult: for postop medical management    Primary Care Physician   Manoj Daley    Chief Complaint   Consulted for postop medical management    History is obtained from the patient    History of Present Illness   Cristy Bailey is a 73 year old female patient with past medical history of hypertension, osteoarthritis, was admitted for elective surgery. She underwent right total hip arthroplasty. We were consulted for postop medical management. Patient denies nausea, vomiting ,abdominal pain. She has no fever. No fever. She stated that her pain is not well controlled.      Past Medical History    I have reviewed this patient's medical history and updated it with pertinent information if needed.   Past Medical History:   Diagnosis Date     Arthritis     hip     HTN (hypertension) 1/2010      Leg weakness 3/24/2015     Lyme disease 1980'S AND 2004    lYME DZ LIKE SXS RESPONDED TO ABX       Past Surgical History   I have reviewed this patient's surgical history and updated it with pertinent information if needed.  Past Surgical History:   Procedure Laterality Date     BIOPSY OF UTERUS LINING  3/2010    negative.      C C-SEC ONLY,PREV C-SEC      c-sec  x 3      COLONOSCOPY  2/21/10    benign findings. advised 10 yr f/u.        Prior to Admission Medications   Prior to Admission Medications   Prescriptions Last Dose Informant Patient Reported? Taking?   gabapentin (NEURONTIN) 300 MG capsule 2018 at Unknown time  No Yes   Sig: Take 1 capsule (300 mg) by mouth 2 times daily   lisinopril (PRINIVIL/ZESTRIL) 5 MG tablet 2018 at Unknown time  No Yes   Sig: Take 1 tablet (5 mg) by mouth daily      Facility-Administered Medications Last Administration Doses Remaining   lidocaine (PF) (XYLOCAINE) 1 % injection 0.5 mL 2018 10:49 AM         Allergies   Allergies   Allergen Reactions     No Known Drug Allergies        Social History   I have reviewed this patient's social history and updated it with pertinent information if needed. Cristy Bailey  reports that she quit smoking about 34 years ago. She has never used smokeless tobacco. She reports that she does not drink alcohol or use illicit drugs.    Family History   I have reviewed this patient's family history and updated it with pertinent information if needed.   Family History   Problem Relation Age of Onset     Cerebrovascular Disease Father      -stroke at age 80     Family History Negative Mother       Diedn her 80's of unknown causes.  Pt otherwise unaware of her health hx.      Unknown/Adopted Mother      Diabetes Brother      (Christian)  of DM complications at age 50.     Alcohol/Drug Brother      Christian     C.A.D. Brother      Christian.      HEART DISEASE Brother      Christian--MI age 50.     Family History Negative Brother      Family History Negative Brother      Family History Negative Sister      Family History Negative Sister      Family History Negative Son      Family History Negative Daughter      Family History Negative Daughter        Review of Systems   The 10 point Review of Systems is negative other than noted in the HPI or here. Patient claims that her pain is not well controlled.  No headaches    Physical Exam   Temp: 96.3  F (35.7  C) Temp src: Oral BP: 104/63   Heart Rate: 75 Resp: 18 SpO2: 99 % O2 Device: Nasal cannula Oxygen Delivery: 3 LPM  Vital Signs with Ranges  Temp:  [96.3  F (35.7  C)-98.1  F (36.7  C)] 96.3  F (35.7  C)  Heart Rate:  [57-82] 75  Resp:  [8-38] 18  BP: ()/(45-97) 104/63  FiO2 (%):  [100 %] 100 %  SpO2:  [94 %-100 %] 99 %  0 lbs 0 oz    GEN:  Alert, oriented x 3, appears comfortable, NAD.  HEENT:  Normocephalic/atraumatic, no scleral icterus, no nasal discharge, mouth moist.  CV:  Regular rate and rhythm, no murmur or JVD.  S1 + S2 noted, no S3 or S4.  LUNGS:  Clear to auscultation bilaterally without rales/rhonchi/wheezing/retractions.  Symmetric chest rise on inhalation noted.  ABD:  Active bowel sounds, soft, non-tender/non-distended.  No rebound/guarding/rigidity.  EXT:  No edema or cyanosis.  Hands/feet warm to touch with good signs of peripheral perfusion.  No joint synovitis noted.  SKIN:  Dry to touch, no exanthems noted in the visualized areas.  NEURO:  Symmetric muscle strength, sensation to touch grossly intact.  No new focal deficits appreciated.    Data   -Data reviewed today: All pertinent laboratory and imaging results from this encounter were reviewed. I personally reviewed no images or EKG's today.    Recent Labs  Lab 07/30/18  1353 07/28/18  1028   HGB  --  14.6     --    NA  --  143   POTASSIUM  --  4.0   CHLORIDE  --  106   CO2  --  29   BUN  --  17   CR 0.60 0.60   ANIONGAP  --  8   ASHIA  --  8.8   GLC  --  101*       Recent Results (from the past 24 hour(s))   XR Pelvis 1/2 Views    Narrative    XR PELVIS ONE VIEW 7/30/2018 12:46 PM    HISTORY: Post Op Hip Arthroplasty;     COMPARISON: 4/30/2018 AP pelvis      Impression    IMPRESSION:   Limited view of the mid to lower pelvis demonstrates a new right hip  arthroplasty in near anatomic alignment on single AP view..    KARLENE TATE MD

## 2018-07-30 NOTE — PHARMACY-ADMISSION MEDICATION HISTORY
Medication reconciliation completed by pre-admitting.    Prior to Admission medications    Medication Sig Last Dose Taking? Auth Provider   gabapentin (NEURONTIN) 300 MG capsule Take 1 capsule (300 mg) by mouth 2 times daily 7/29/2018 at Unknown time Yes Bry Garcia MD   lisinopril (PRINIVIL/ZESTRIL) 5 MG tablet Take 1 tablet (5 mg) by mouth daily 7/29/2018 at Unknown time Yes Manoj Daley PA-C

## 2018-07-30 NOTE — H&P (VIEW-ONLY)
[facility]  [address] [place] [id number]  [phone]  Dept: [phone]    PRE-OP EVALUATION:  Today's date: 2018    Cristy Bailey (: 1945) presents for pre-operative evaluation assessment as requested by Dr. Ryan Engel.  She requires evaluation and anesthesia risk assessment prior to undergoing surgery/procedure for treatment of right hip replacement .    ARTHROPLASTY HIP Right Other   Right total hip arthroplasty     Choice anesthesia           Fax number for surgical facility: [phone]  Primary Physician: Manoj Daley  Type of Anesthesia Anticipated: Choice    Patient has a Health Care Directive or Living Will:  NO    Preop Questions 2018   Who is doing your surgery? ryan engel   What are you having done? right hip replacement   Date of Surgery/Procedure:    Facility or Hospital where procedure/surgery will be performed: [facility]   1.  Do you have a history of Heart attack, stroke, stent, coronary bypass surgery, or other heart surgery? No   2.  Do you ever have any pain or discomfort in your chest? No   3.  Do you have a history of  Heart Failure? No   4.   Are you troubled by shortness of breath when:  walking on a level surface, or up a slight hill, or at night? No   5.  Do you currently have a cold, bronchitis or other respiratory infection? No   6.  Do you have a cough, shortness of breath, or wheezing? No   7.  Do you sometimes get pains in the calves of your legs when you walk? No   8. Do you or anyone in your family have previous history of blood clots? No   9.  Do you or does anyone in your family have a serious bleeding problem such as prolonged bleeding following surgeries or cuts? No   10. Have you ever had problems with anemia or been told to take iron pills? No   11. Have you had any abnormal blood loss such as black, tarry or bloody stools, or abnormal vaginal bleeding? No   12. Have you ever had a  blood transfusion? No   13. Have you or any of your relatives ever had problems with anesthesia? No   14. Do you have sleep apnea, excessive snoring or daytime drowsiness? No   15. Do you have any prosthetic heart valves? No   16. Do you have prosthetic joints? No   17. Is there any chance that you may be pregnant? No         HPI:     HPI related to upcoming procedure: one year of hip pain due to avascular necrosis on the right.      See problem list for active medical problems.  Problems all longstanding and stable, except as noted/documented.  See ROS for pertinent symptoms related to these conditions.                                                                                                                                                          .    MEDICAL HISTORY:     Patient Active Problem List    Diagnosis Date Noted     Morbid obesity (H) 09/22/2017     Priority: Medium     Constipation 03/25/2015     Priority: Medium     Problem list name updated by automated process. Provider to review       Myalgia and myositis 03/25/2015     Priority: Medium     Problem list name updated by automated process. Provider to review       Leg weakness 03/24/2015     Priority: Medium     Constipation 04/01/2014     Priority: Medium     HTN, goal below 140/90 07/30/2013     Priority: Medium     Health Care Home 05/18/2012     Priority: Medium     FPA [employer] for .  Amy Wray RN-BSN, [facility]  [phone]   DX V65.8 REPLACED WITH 81086 HEALTH CARE HOME (04/08/2013)       CRP elevated 05/17/2012     Priority: Medium     Vitamin D deficiency 05/17/2012     Priority: Medium     Leg weakness, bilateral 05/13/2012     Priority: Medium     Hyperlipidemia LDL goal <160 08/02/2011     Priority: Medium     Abnormal glucose 08/02/2011     Priority: Medium     IMO update changed this record. Please review for accuracy       Obesity 08/02/2011     Priority: Medium     Advanced directives,  counseling/discussion 08/02/2011     Priority: Medium     Advance Directive Problem List Overview:   Name Relationship Phone    Primary Health Care Agent            Alternative Health Care Agent          Discussed advance care planning with patient; information given to patient to review. 8/2/2011          HTN (hypertension) 01/19/2010     Priority: Medium      Past Medical History:   Diagnosis Date     Arthritis     hip     HTN (hypertension) 1/2010      Leg weakness 3/24/2015     Lyme disease 1980'S AND 2004    lYME DZ LIKE SXS RESPONDED TO ABX     Past Surgical History:   Procedure Laterality Date     BIOPSY OF UTERUS LINING  3/2010    negative.      C C-SEC ONLY,PREV C-SEC      c-sec x 3      COLONOSCOPY  2/21/10    benign findings. advised 10 yr f/u.      Current Outpatient Prescriptions   Medication Sig Dispense Refill     diazepam (VALIUM) 10 MG tablet Take 1 tablet (10 mg) by mouth every 6 hours as needed for anxiety or sleep Take 30-60 minutes before procedure.  Do not operate a vehicle after taking this medication. (Patient not taking: Reported on 7/26/2018) 1 tablet 0     gabapentin (NEURONTIN) 300 MG capsule Take 1 capsule (300 mg) by mouth 2 times daily 180 capsule 0     HYDROcodone-acetaminophen (NORCO) 5-325 MG per tablet Take 1 tablet by mouth At Bedtime 30 tablet 0     lisinopril (PRINIVIL/ZESTRIL) 5 MG tablet Take 1 tablet (5 mg) by mouth daily 90 tablet 1     order for DME Equipment being ordered: Compression stockings 20-30mmHg 2 Package 0     OTC products: no recent use of OTC ASA, NSAIDS or Steroids    Allergies   Allergen Reactions     No Known Drug Allergies       Latex Allergy: NO    Social History   Substance Use Topics     Smoking status: Former Smoker     Quit date: 8/2/1984     Smokeless tobacco: Never Used     Alcohol use No     History   Drug Use No       REVIEW OF SYSTEMS:   Constitutional, HEENT, cardiovascular, pulmonary, gi and gu systems are negative, except as otherwise  noted.    EXAM:   There were no vitals taken for this visit.    GENERAL APPEARANCE: healthy, alert and no distress     EYES: EOMI, PERRL     HENT: ear canals and TM's normal and nose and mouth without ulcers or lesions     NECK: no adenopathy, no asymmetry, masses, or scars and thyroid normal to palpation     RESP: lungs clear to auscultation - no rales, rhonchi or wheezes     CV: regular rates and rhythm, normal S1 S2, no S3 or S4 and no murmur, click or rub     ABDOMEN:  soft, nontender, no HSM or masses and bowel sounds normal     MS: extremities normal- no gross deformities noted, no evidence of inflammation in joints, FROM in all extremities.     SKIN: no suspicious lesions or rashes     NEURO: Normal strength and tone, sensory exam grossly normal, mentation intact and speech normal     PSYCH: mentation appears normal. and affect normal/bright     LYMPHATICS: No cervical adenopathy    DIAGNOSTICS:     EKG: appears normal, NSR, normal axis, normal intervals, no acute ST/T changes c/w ischemia, no LVH by voltage criteria, unchanged from previous tracings.  MRSA/MSSA screening for elective joint replacement surgery.  Labs Resulted Today:   Results for orders placed or performed in visit on 07/28/18   Hemoglobin   Result Value Ref Range    Hemoglobin 14.6 11.7 - 15.7 g/dL     Labs Drawn and in Process:   Unresulted Labs Ordered in the Past 30 Days of this Admission     Date and Time Order Name Status Description    7/28/2018 1035 METHICILLIN RESIST/SENS S. AUREUS PCR In process     7/28/2018 1012 BASIC METABOLIC PANEL In process           Recent Labs   Lab Test  04/30/18   1246  02/08/18   1907   03/24/15   0827   05/03/12   0837   HGB  14.3  14.0   --    --    < >   --    PLT  283  280   --    --    < >   --    NA  142  141   < >  142   < >  139   POTASSIUM  3.8  4.3   < >  4.8   < >  4.4   CR  0.58  0.64   < >  0.67   < >  0.64   A1C   --    --    --   5.9   --   5.8    < > = values in this interval not  displayed.      IMPRESSION:   Reason for surgery/procedure: right hip pain  Diagnosis/reason for consult: pre op exam    The proposed surgical procedure is considered INTERMEDIATE risk.    REVISED CARDIAC RISK INDEX  The patient has the following serious cardiovascular risks for perioperative complications such as (MI, PE, VFib and 3  AV Block):  No serious cardiac risks  INTERPRETATION: 0 risks: Class I (very low risk - 0.4% complication rate)    The patient has the following additional risks for perioperative complications:  No identified additional risks  Morbid obesity      ICD-10-CM    1. Preop general physical exam Z01.818 Hemoglobin     EKG 12-lead complete w/read - Clinics     Basic metabolic panel     Methicillin Resist/Sens S. aureus PCR     CANCELED: MRSA MSSA Presurgical Screen   2. Avascular necrosis of bone of hip, right (H) M87.051    3. Morbid obesity (H) E66.01    4. Essential hypertension I10          RECOMMENDATIONS:     --Consult hospital rounder / IM to assist post-op medical management      ACE Inhibitor or Angiotensin Receptor Blocker (ARB) Use  Ace inhibitor or Angiotensin Receptor Blocker (ARB) and should HOLD this medication for the 24 hours prior to surgery.      APPROVAL GIVEN to proceed with proposed procedure, without further diagnostic evaluation       Signed Electronically by: Nick Schuster PA-C    Copy of this evaluation report is provided to requesting physician.    [facility] Preop Guidelines    Revised Cardiac Risk Index

## 2018-07-30 NOTE — IP AVS SNAPSHOT
` ` Patient Information     Patient Name Sex     Cristy Bailey ([id number]) Female 1945       Room Bed    [address] [place], [place] [address]      Patient Demographics     Address Phone E-mail Address    [address] [place] [address] [phone] (Home) *Preferred*  none (Work)  [phone] (Mobile) [email or web address]      Patient Ethnicity & Race     Ethnic Group Patient Race    American White      Emergency Contact(s)     Name Relation Home Work Mobile    Marquita Rodrgiuez Sister [phone]      Leo Akbar Friend [phone]      Documents on File        Status Date Received Description       Documents for the Patient    Privacy Notice - [place] Received () 04     Insurance Card  () 04     Face Sheet Received () 03/10/09     Insurance Card  () 07     External Medication Information Consent Accepted () 01/19/10     Face Sheet Received () 09/16/10     Insurance Card Received () 09/16/10     Consent for Services - Hospital/Clinic Received () 11     External Medication Information Consent Accepted () 11     Insurance Card Received () 11     Insurance Card Received () 11     Consent for Services - Hospital/Clinic Received () 11     Insurance Card Received () 12     External Medication Information Consent Accepted () 12     Insurance Card Received () 13 [facility]    Privacy Notice - [place] Received 05/10/12     Consent for Services - Hospital/Clinic Received () 12     Consent for EHR Access  13 Copied from existing Consent for services - C/HOD collected on 2012    [facility] Specified Other       Consent for Services - Hospital/Clinic Received () 13     External Medication Information Consent Accepted 13     Insurance Card Received () 14 [facility] seniors    Insurance Card  Received () 14 [employer]    Consent for Services - Hospital/Clinic Received () 14     Consent for Services/Privacy Notice - Hospital/Clinic Received 17     Patient ID Received 18 [place]     Ins[place] Card Received () 17 [employer] 2017    Care Everywhere Prospective Auth Received 18     Insurance Card Received 18 [employer]    Consent for Services - Hospital and Clinic Received 18     HIE Auth Received 18     Patient Entered Drawing       Consent for Services - [facility]       Insurance Card Received 18 MEDICARE 2018    Patient ID  (Deleted) 09/29/10     Advance Directives and Living Will Received (Deleted) 12     Patient ID  (Deleted)      Patient Photo   Photo of Patient       Documents for the Encounter    CMS IM for Patient Signature Received 18     CMS IM for Patient Signature Received 18 2nd [facility]      Admission Information     Attending Provider Admitting Provider Admission Type Admission Date/Time    Bry Garcia MD Wengler, Michael David, MD Elective 18  0542    Discharge Date Hospital Service Auth/Cert Status Service Area     Surgery Incomplete [facility] SERVICES    Unit Room/Bed Admission Status        ORTHO SPINE 602/602- Admission (Confirmed)       Admission     Complaint    right hip avascular necrosis, Hip osteoarthritis      Hospital Account     Name Acct ID Class Status Primary Coverage    Cristy Bailey [id number] Inpatient Open UCARE - [employer] FOR SENIORS            Guarantor Account (for Hospital Account #[id number])     Name Relation to Pt Service Area Active? Acct Type    Cristy Bailey  FCS Yes Personal/Family    Address Phone          [address] [facility] [address]  [place] [address] [phone](H)  none(O)              Coverage Information (for Hospital Account #[id number])     F/O Payor/Plan Precert #    UCARE/UCARE FOR SENIORS     Subscriber Subscriber #    Cristy Bailey  [id number]    Address Phone    [address]  [place] [address] [phone]

## 2018-07-31 ENCOUNTER — APPOINTMENT (OUTPATIENT)
Dept: PHYSICAL THERAPY | Facility: CLINIC | Age: 73
DRG: 470 | End: 2018-07-31
Attending: ORTHOPAEDIC SURGERY
Payer: COMMERCIAL

## 2018-07-31 LAB — HGB BLD-MCNC: 10.8 G/DL (ref 11.7–15.7)

## 2018-07-31 PROCEDURE — 36416 COLLJ CAPILLARY BLOOD SPEC: CPT | Performed by: ORTHOPAEDIC SURGERY

## 2018-07-31 PROCEDURE — 40000193 ZZH STATISTIC PT WARD VISIT

## 2018-07-31 PROCEDURE — 99231 SBSQ HOSP IP/OBS SF/LOW 25: CPT | Performed by: INTERNAL MEDICINE

## 2018-07-31 PROCEDURE — G0463 HOSPITAL OUTPT CLINIC VISIT: HCPCS

## 2018-07-31 PROCEDURE — 25000128 H RX IP 250 OP 636: Performed by: INTERNAL MEDICINE

## 2018-07-31 PROCEDURE — 40000894 ZZH STATISTIC OT IP EVAL DEFER

## 2018-07-31 PROCEDURE — 25000132 ZZH RX MED GY IP 250 OP 250 PS 637: Performed by: ORTHOPAEDIC SURGERY

## 2018-07-31 PROCEDURE — 25000128 H RX IP 250 OP 636: Performed by: ORTHOPAEDIC SURGERY

## 2018-07-31 PROCEDURE — 12000000 ZZH R&B MED SURG/OB

## 2018-07-31 PROCEDURE — 85018 HEMOGLOBIN: CPT | Performed by: ORTHOPAEDIC SURGERY

## 2018-07-31 PROCEDURE — 99207 ZZC CDG-MDM COMPONENT: MEETS LOW - DOWN CODED: CPT | Performed by: INTERNAL MEDICINE

## 2018-07-31 PROCEDURE — 97530 THERAPEUTIC ACTIVITIES: CPT | Mod: GP

## 2018-07-31 PROCEDURE — 97110 THERAPEUTIC EXERCISES: CPT | Mod: GP

## 2018-07-31 RX ADMIN — SENNOSIDES AND DOCUSATE SODIUM 1 TABLET: 8.6; 5 TABLET ORAL at 08:37

## 2018-07-31 RX ADMIN — KETOROLAC TROMETHAMINE 15 MG: 15 INJECTION, SOLUTION INTRAMUSCULAR; INTRAVENOUS at 06:18

## 2018-07-31 RX ADMIN — GABAPENTIN 300 MG: 300 CAPSULE ORAL at 20:36

## 2018-07-31 RX ADMIN — SENNOSIDES AND DOCUSATE SODIUM 1 TABLET: 8.6; 5 TABLET ORAL at 20:36

## 2018-07-31 RX ADMIN — HYDROMORPHONE HYDROCHLORIDE 4 MG: 2 TABLET ORAL at 00:33

## 2018-07-31 RX ADMIN — GABAPENTIN 300 MG: 300 CAPSULE ORAL at 08:37

## 2018-07-31 RX ADMIN — CEFAZOLIN SODIUM 2 G: 2 INJECTION, SOLUTION INTRAVENOUS at 00:34

## 2018-07-31 RX ADMIN — KETOROLAC TROMETHAMINE 15 MG: 15 INJECTION, SOLUTION INTRAMUSCULAR; INTRAVENOUS at 00:44

## 2018-07-31 RX ADMIN — ACETAMINOPHEN 975 MG: 325 TABLET, FILM COATED ORAL at 13:35

## 2018-07-31 RX ADMIN — SODIUM CHLORIDE 500 ML: 9 INJECTION, SOLUTION INTRAVENOUS at 17:05

## 2018-07-31 RX ADMIN — HYDROMORPHONE HYDROCHLORIDE 4 MG: 2 TABLET ORAL at 04:34

## 2018-07-31 RX ADMIN — ENOXAPARIN SODIUM 40 MG: 40 INJECTION SUBCUTANEOUS at 06:17

## 2018-07-31 RX ADMIN — HYDROMORPHONE HYDROCHLORIDE 4 MG: 2 TABLET ORAL at 08:36

## 2018-07-31 RX ADMIN — ACETAMINOPHEN 975 MG: 325 TABLET, FILM COATED ORAL at 06:17

## 2018-07-31 RX ADMIN — KETOROLAC TROMETHAMINE 15 MG: 15 INJECTION, SOLUTION INTRAMUSCULAR; INTRAVENOUS at 13:35

## 2018-07-31 ASSESSMENT — ACTIVITIES OF DAILY LIVING (ADL)
ADLS_ACUITY_SCORE: 23
ADLS_ACUITY_SCORE: 22

## 2018-07-31 NOTE — PROGRESS NOTES
[facility]  Hospitalist Progress Note  Froy Krishnamurthy MD 07/31/2018    Reason for Stay (Diagnosis): Post right total hip arthroplasty.         Assessment and Plan:      Summary of Stay: Cristy Bailey is a 73 year old female admitted on 7/30/2018 post right total hip arthroplasty.    Problem List:     1. S/P right total hip arthroplasty:   -Patient underwent surgery today.   -Continue pain medications per orthopedic surgery     2. Hypertension:   -Blood pressure is actually on the low side this afternoon.   -We will discontinue lisinopril and monitor the patient.     3.  Acute blood loss anemia-this is likely postop and also some dilutional component.  - Hemoglobin dropped from 14.6-10.8.    # Pain Assessment:  Current Pain Score 7/31/2018   Patient currently in pain? -   Pain score (0-10) 6   Pain location -   Pain descriptors -   - Cristy is experiencing pain due to postop. Pain management was discussed and the plan was created in a collaborative fashion.  Cristy's response to the current recommendations: engaged  - Please see the plan for pain management as documented above    DVT Prophylaxis: Defer to primary service.  Code Status: Full Code.  Discharge Dispo: Pending PT, likely to TCU.  Estimated Disch Date / # of Days until Disch: Per orthopedic surgery.  I discussed with patient and nursing staff.      Interval History (Subjective):      Patient seen and examined, feels better, tolerating oral intake, at baseline uses a walker, requiring 2 assist to transfer this time.                  Physical Exam:      Last Vital Signs:  BP 91/44 (BP Location: Left arm)  Temp 96.5  F (35.8  C) (Oral)  Resp 16  SpO2 97%    I/O last 3 completed shifts:  In: 4443 [P.O.:60; I.V.:4383]  Out: 1100 [Urine:350; Blood:750]  Current Facility-Administered Medications   Medication     [START ON 8/2/2018] acetaminophen (TYLENOL) tablet 650 mg     acetaminophen (TYLENOL) tablet 975 mg     benzocaine-menthol  (CHLORASEPTIC) 6-10 MG lozenge 1-2 lozenge     enoxaparin (LOVENOX) injection 40 mg     gabapentin (NEURONTIN) capsule 300 mg     HYDROmorphone (DILAUDID) tablet 2-4 mg     HYDROmorphone (PF) (DILAUDID) injection 0.5-1 mg     hydrOXYzine (ATARAX) tablet 10 mg     lactated ringers infusion     lidocaine (LMX4) kit     lidocaine 1 % 1 mL     lisinopril (PRINIVIL/ZESTRIL) tablet 5 mg     naloxone (NARCAN) injection 0.1-0.4 mg     ondansetron (ZOFRAN-ODT) ODT tab 4 mg    Or     ondansetron (ZOFRAN) injection 4 mg     senna-docusate (SENOKOT-S;PERICOLACE) 8.6-50 MG per tablet 1 tablet    Or     senna-docusate (SENOKOT-S;PERICOLACE) 8.6-50 MG per tablet 2 tablet     sodium chloride (PF) 0.9% PF flush 3 mL     sodium chloride (PF) 0.9% PF flush 3 mL     sodium chloride (PF) 0.9% PF flush 3 mL     sodium chloride 0.9% infusion     traZODone (DESYREL) half-tab 25 mg       Constitutional: Awake, alert, cooperative, no apparent distress   Respiratory: Clear to auscultation bilaterally, no crackles or wheezing   Cardiovascular: Regular rate and rhythm, normal S1 and S2, and no murmur noted   Abdomen: Normal bowel sounds, soft, non-distended, non-tender   Skin: No rashes, no cyanosis, dry to touch   Neuro: Alert and oriented x3, no weakness, numbness, memory loss   Extremities: No edema, normal range of motion   Other(s):HEENT        All other systems: Negative          Medications:      All current medications were reviewed with changes reflected in problem list.         Data:      All new lab and imaging data was reviewed.   Labs:    Recent Labs  Lab 07/30/18  1353 07/28/18  1028   NA  --  143   POTASSIUM  --  4.0   CHLORIDE  --  106   CO2  --  29   ANIONGAP  --  8   GLC  --  101*   BUN  --  17   CR 0.60 0.60   GFRESTIMATED >90 >90   GFRESTBLACK >90 >90   ASHIA  --  8.8       Recent Labs  Lab 07/31/18  0604 07/30/18  1353 07/28/18  1028   HGB 10.8*  --  14.6   PLT  --  153  --       Imaging:   Results for orders placed or  performed during the hospital encounter of 07/30/18   XR Pelvis 1/2 Views    Narrative    XR PELVIS ONE VIEW 7/30/2018 12:46 PM    HISTORY: Post Op Hip Arthroplasty;     COMPARISON: 4/30/2018 AP pelvis      Impression    IMPRESSION:   Limited view of the mid to lower pelvis demonstrates a new right hip  arthroplasty in near anatomic alignment on single AP view..    KARLENE TATE MD

## 2018-07-31 NOTE — PROGRESS NOTES
[facility]  Orthopedics Progress Note             Interval History:     73 year old female admitted postoperatively for elective Right Total hip arthroplasty  with Dr. DILMA Garcia due to DJD unresponsive to non operative treatment.  There were no intraoperative complications.  Patient currently Post op day #1.    Patient was sound asleep at time of visit and required loud verbal to awaken.  Once awake was attentive and talkative.  She reports minimal pain at right hip, has more pain at left.  Open to TCU now.  Eating, sleeping, transferred bed to chair, catheter.  No new complaints.     Nursing reports crying at times, but can verbally console.  Also somnolent so limiting pain meds when tolerated, holding lisinopril due to hypotension, O2 at 2-3L NC and can hold upper 90s saturation.    Pain: tolerable.   Nausea: none.   Light headedness : none  Chest pain: none  Shortness of breath: none              Assessment and Plan:   S/P R JENAE, day #1  Mild hypotension otherwise VSS, hemodynamically asymptomatic, pain management adequate.  Known anxiety and pain sensitive.    PLAN:  DVT proph  PT/OT  hospitalist cares.   for placement.    Discharge to TCU, hopeful for Thursday.                  Physical Exam:   Patient Vitals for the past 12 hrs:   BP Temp Temp src Heart Rate Resp SpO2   07/31/18 0921 - - - - 16 -   07/31/18 0900 - - - - 16 -   07/31/18 0836 - - - - 16 -   07/31/18 0801 91/44 96.5  F (35.8  C) Oral 69 18 97 %   07/31/18 0519 - - - - 18 -   07/31/18 0434 - - - - 18 -   07/31/18 0118 - - - - 18 -     Hemoglobin   Date Value Ref Range Status   07/31/2018 10.8 (L) 11.7 - 15.7 g/dL Final   07/28/2018 14.6 11.7 - 15.7 g/dL Final       Patient is alert and oriented.  Vitals: stable  Neurovascular status: intact  Dressing: with 2 spots of saturation.  Calves: soft and non tender      Donal Grover PA-C  [facility] - Surgery    7/31/2018

## 2018-07-31 NOTE — PLAN OF CARE
Problem: Patient Care Overview  Goal: Plan of Care/Patient Progress Review  Discharge Planner OT   Patient plan for discharge: Open to rehab options  Current status: Per discussion with PT and patient, pt understands the recommendation of TCU at discharge based on current status. Pt requiring total A (max Ax2 and Mariah Steady) for mobility, increased assist with all ADLs/IADLs due to significant pain in B hips. Pt would benefit from skilled OT services, however most appropriate to initiate OT eval at TCU. Pt educated on OT role and deferring of OT eval to TCU, pt in agreement, no questions/concerns. Will complete IP OT order.   Barriers to return to prior living situation: See PT note for barriers  Recommendations for discharge: TCU - defer OT eval to TCU  Rationale for recommendations: Pt is significantly below baseline level of functioning and is unable to return home at this time. Pt now planning to discharge to TCU, recommend OT eval at TCU.        Entered by: Anna Collins 07/31/2018 10:22 AM

## 2018-07-31 NOTE — PLAN OF CARE
Problem: Patient Care Overview  Goal: Plan of Care/Patient Progress Review  A/O x4, VSS: Soft BP, anxious, crying episodes, CMS: Intact, Drsg: C./D/I, Pain controlled with IV Toradol, and PO Dilaudid and Tylenol. Catheter patent and putting out good amounts.

## 2018-07-31 NOTE — PLAN OF CARE
Problem: Patient Care Overview  Goal: Plan of Care/Patient Progress Review  PT Eval complete.  Limited due to pain and anxious about slight movements.   Pt c/o more L LE pain than R LE during PT session.  Pt states that NSG and MD aware.  Pt would likely benefit from a pain consult.    * Pt exhibits anxiety related to anything that could, even if unlikely, create add'l pain  (i.e. Moving covers, adjusting tray near her, adjusting lines, moving entire bed up/dn. Please make sure you talk through actions with pt prior to performing them).  Even such things as moving the entire bed up/dn for better positioning for staff may startle her, even though it would be unlikely to change her pain.    Discharge Planner PT   Patient plan for discharge: Pt plans to discharge to home (trailer) with her friend, Leo who has been helping her for the past ~ year.    Current status: No mobility performed today due to pain.  Has significant pain with small movements of the R LE and at rest (R and L LE).  Pt anxious during session, tearful.    Barriers to return to prior living situation: Prior to surgery, pt was non-amb for about a year and has relied on a w/c for mobility and assist.  Leo (friend) has been helping her and she describes transfers as Leo lifting her and moving her with limited ability on her part.  Pt lives in a trailer with a ramp to enter.  No stairs.  Pt's w/c is too narrow for pt (pt states she has been trying to get a bigger one from the [facility] and was measured for one, but has not yet got a new w/c).  Significant concerns for safety if pt returns home as pt described unsafe transfer techniques and also sounds that pt requires max A+ for transfers from Leo.  She also describes significant pain (yelling in pain) with all transfers prior to admission (L and R hips).  Pt states she can't move her legs, though with encouragement she was able to move them slightly in bed with assist.  Pt is very cautious to all  touch as she worries that it will increase her pain.   Recommendations for discharge: Pt is planning to return home with assist, however question safety with this due to the above.  Also anticipate limitations to therapy tolerance and mobility due to severe L hip pain (OA). If pt returns to home, would require A of 1-2, would also need a reclining w/c due to hip precautions; other DME will also be determined (i.e. Walker, ADL equipment).    Rationale for recommendations: Based on anticipated progression with mobility.         Entered by: Janee Magana 07/30/2018 7:03 PM

## 2018-07-31 NOTE — PLAN OF CARE
Problem: Patient Care Overview  Goal: Plan of Care/Patient Progress Review  pts pain is controlled with po meds.  Pt was up with assist of 2 and lata steady to the chair. Pt states she sits and sleeps in the recliner at home. Pt is planning to go to TCU at discharge.

## 2018-07-31 NOTE — PLAN OF CARE
Problem: Patient Care Overview  Goal: Plan of Care/Patient Progress Review  Patient A&Ox4, very anxious. Ambulates Ax2 w/ lata steady, weight bear as tolerated. Patient was hypotensive 86/45, MD paged, IV bolus ordered and given. +BS, not passing flatus, tolerating diet. Dressing CDI. Patient reports baseline numbness bilaterally in lower extremities. Has PO Dilaudid available for pain control. Will continue to monitor.

## 2018-07-31 NOTE — PROGRESS NOTES
07/30/18 1916   Quick Adds   Type of Visit Initial PT Evaluation   Living Environment   Lives With friend(s)   Living Arrangements (trailer house)   Home Accessibility ramps present at home   Living Environment Comment has bars or rails in bathroom to pull up   Self-Care   Usual Activity Tolerance poor   Current Activity Tolerance poor   Regular Exercise no   Functional Level Prior   Ambulation 4-->completely dependent   Transferring 3-->assistive equipment and person   Toileting 3-->assistive equipment and person   Bathing 3-->assistive equipment and person   Dressing 3-->assistive equipment and person   Fall history within last six months yes   Number of times patient has fallen within last six months (multiple)   Prior Functional Level Comment poor activity tolerance for 5+ years due to B hip pain. nonamb for the past year. relies on WC and assist.  see care plan note for add'l details.  transfer techniques described are likely unsafe for both pt and friend.   General Information   Onset of Illness/Injury or Date of Surgery - Date 07/30/18   Referring Physician Dr. Coffman   Patient/Family Goals Statement to go home   Pertinent History of Current Problem (include personal factors and/or comorbidities that impact the POC) s/p R JENAE   Precautions/Limitations right hip precautions   Weight-Bearing Status - RLE weight-bearing as tolerated   General Observations in bed on arrival; teary at times, anxious about pain   Cognitive Status Examination   Level of Consciousness alert   Follows Commands and Answers Questions 100% of the time   Pain Assessment   Patient Currently in Pain Yes, see Vital Sign flowsheet  (did not rate)   Range of Motion (ROM)   ROM Comment LE ROM limited due to pain, very small LE movements tolerance while in bed with PT assist (i.e. less than 10-15 degres of hip flex; less than 5 degrees of hip abd).     Strength   Strength Comments not tested but likely B LE weakness due to inactivity.   "difficult to assess due to pain   Bed Mobility   Bed Mobility Comments not tested   Transfer Skills   Transfer Comments not tested   Gait   Gait Comments not tested   General Therapy Interventions   Planned Therapy Interventions bed mobility training;gait training;ROM;strengthening;stretching;transfer training   Clinical Impression   Criteria for Skilled Therapeutic Intervention yes, treatment indicated   PT Diagnosis R JENAE   Influenced by the following impairments decreased strength, pain , stiffness   Functional limitations due to impairments decreased indep with moiblity   Clinical Presentation Evolving/Changing   Clinical Presentation Rationale based on status   Clinical Decision Making (Complexity) Moderate complexity   Therapy Frequency` 2 times/day   Predicted Duration of Therapy Intervention (days/wks) 3 days   Anticipated Equipment Needs at Discharge front wheeled walker;wheelchair custom   Anticipated Discharge Disposition Home with Assist;Transitional Care Facility   Risk & Benefits of therapy have been explained Yes   Patient, Family & other staff in agreement with plan of care Yes   [facility] TM \"6 Clicks\"   2016, Trustees of [facility], under license to [employer].  All rights reserved.   6 Clicks Short Forms Basic Mobility Inpatient Short Form   [facility]  \"6 Clicks\" V.2 Basic Mobility Inpatient Short Form   1. Turning from your back to your side while in a flat bed without using bedrails? 1 - Total   2. Moving from lying on your back to sitting on the side of a flat bed without using bedrails? 1 - Total   3. Moving to and from a bed to a chair (including a wheelchair)? 1 - Total   4. Standing up from a chair using your arms (e.g., wheelchair, or bedside chair)? 1 - Total   5. To walk in hospital room? 1 - Total   6. Climbing 3-5 steps with a railing? 1 - Total   Basic Mobility Raw Score (Score out of 24.Lower scores equate to lower levels of function) 6   Total " Evaluation Time   Total Evaluation Time (Minutes) 20

## 2018-07-31 NOTE — PROGRESS NOTES
"Patient referred for Healing Touch by RN due to pain and anxiety. Healing Touch explained to patient and patient consented to Healing Touch session. Healing Touch performed with music, pain prior to session back 8/10 left hip 10/10. Anxiety high but not rated, patient crying. Pain nearly gone in back 2/10 hip reduced to 5/10, anxiety much better. \" My back pain just went away!\" Time spent with patient 60 minutes.    Anderson AGUILERA RN-BC HNB-BC   "

## 2018-07-31 NOTE — PLAN OF CARE
Problem: Patient Care Overview  Goal: Plan of Care/Patient Progress Review  Discharge Planner PT   Patient plan for discharge: Open to rehab  Current status: Supine to sit with mod to max A x 2. Sit to/from  Mariah Steady with min to mod A x 3. Bed to chair via Mariah Steady and SBA x 3. Pt very anxious throughout session. Needs encouragement to participate.   Barriers to return to prior living situation: Significant concerns for safety if pt returns home as pt described unsafe transfer techniques on evaluation and also sounds that pt requires max A+ for transfers from Leo.  She also describes significant pain (yelling in pain) with all transfers prior to admission (L and R hips).  Recommendations for discharge: TCU.    Rationale for recommendations: Pt would benefit from PT/OT services on discharge to maximize IND/safety with mobility prior to returning home wth assist from her friend Leo. If pt refuses TCU would require A of 2, would also need a reclining w/c due to hip precautions; HHPT/OT and other DME will also be determined (i.e. Walker, ADL equipment).        Entered by: Justine Carrillo 07/31/2018 9:24 AM

## 2018-07-31 NOTE — PROGRESS NOTES
.Discharge Planner   Discharge Plans in progress: TCU  Barriers to discharge plan: none anticipated  Follow up plan: finalize planning in antic. of discharge on Thursday.        Entered by: JOSE GUADALUPE Burris 07/31/2018 3:26 PM

## 2018-07-31 NOTE — PLAN OF CARE
Problem: Patient Care Overview  Goal: Plan of Care/Patient Progress Review  Outcome: Improving  VS-bp on the lower side--held lisinopril --parameters set.   Lung Sounds-clear, no cough, Using IS up to 1500. On capnography  O2-on 3L NC  GI-+faint bs, -flatus, lbm was on Friday. On clear liq diet--had jello. Tolerated well--denies nausea.   -snyder adequate.   IVF-at 100  Dressings-ace bandage on R hip c/d/i. Using Ice.   CMS-denies numbness and tingling, +pp, mod dorsi/planter. Scds.,   Drain-none  Activity-bedrest. Worked with PT--did not get up with them. Did exercises in bed.  Able to sit at the side of the bed with 3 assist with nursing, used lift to reposition to the head of the bed. Pt is w/c bound at home. Very anxious about moving.needs to be informed of what youre doing before touching or moving pt.   Pain-rated pain level a 10 when I first came on. Gave dilaudid po, visteril, and tylenol, using ice.--has pain in her L hip as well. Worse in L hip than in R hip--surgical leg. Pt is w/c bound at home. Anxiety with moving.   D/C Plan-would like to go home with MACIE العراقي. sws consult placed.   Rash noted in abd folds bilaterally and groins bilaterally--light red/pink. Dry in nature, non weeping. woc consult placed. Dermatology does not come here.

## 2018-07-31 NOTE — CONSULTS
.Care Transition Initial Assessment -   Reason For Consult: discharge planning. Chart reviewed noting PT assessment and recommendation, noted per ortho PA the anticipation of pt's discharge to rehab facility on Thursday.   Met with: Patient    Active Problems:    Hip osteoarthritis       DATA  Lives With: friend(s)  Living Arrangements: house  Description of Support System: Supportive, Involved  Who is your support system?: Sibling(s), friends  Support Assessment: Adequate family and caregiver support.        Resources List: Skilled Nursing Facility     Quality Of Family Relationships: supportive, involved  Transportation Available:  (to be determined)     INTERVENTION     Met with pt who affirms planning for her transfer to rehab facility on discharge. Pt identifies [facility] as her facility of preference, semi-private room, with also consideration of [facility] and [place]. Referrals made, [facility] and [facility] would be able to accept pt, revisited with pt and discussed availability at [facility], she has asked that planning at this time be for her transfer there. Medical vs family/friend transport to be determined, discussed with pt that her insurance would not pay for w/c transport and the cost of $70/base and $4.50 which she acknowledged.     ASSESSMENT  Cognitive Status:  alert and oriented   PLAN  Financial costs for the patient includes: w/c transport if desired.   Patient given options and choices for discharge: Yes  Patient/family is agreeable to the plan?  Yes:   Patient Goals and Preferences: independence with ambulation and ADLs  Patient anticipates discharging to:  rehab facility

## 2018-08-01 ENCOUNTER — APPOINTMENT (OUTPATIENT)
Dept: PHYSICAL THERAPY | Facility: CLINIC | Age: 73
DRG: 470 | End: 2018-08-01
Attending: ORTHOPAEDIC SURGERY
Payer: COMMERCIAL

## 2018-08-01 ENCOUNTER — PATIENT OUTREACH (OUTPATIENT)
Dept: CARE COORDINATION | Facility: CLINIC | Age: 73
End: 2018-08-01

## 2018-08-01 LAB
ANION GAP SERPL CALCULATED.3IONS-SCNC: 6 MMOL/L (ref 3–14)
BUN SERPL-MCNC: 24 MG/DL (ref 7–30)
CALCIUM SERPL-MCNC: 7.5 MG/DL (ref 8.5–10.1)
CHLORIDE SERPL-SCNC: 106 MMOL/L (ref 94–109)
CO2 SERPL-SCNC: 27 MMOL/L (ref 20–32)
CREAT SERPL-MCNC: 0.69 MG/DL (ref 0.52–1.04)
GFR SERPL CREATININE-BSD FRML MDRD: 84 ML/MIN/1.7M2
GLUCOSE SERPL-MCNC: 111 MG/DL (ref 70–99)
HGB BLD-MCNC: 8.9 G/DL (ref 11.7–15.7)
HGB BLD-MCNC: 9.1 G/DL (ref 11.7–15.7)
POTASSIUM SERPL-SCNC: 3.8 MMOL/L (ref 3.4–5.3)
SODIUM SERPL-SCNC: 139 MMOL/L (ref 133–144)

## 2018-08-01 PROCEDURE — 97530 THERAPEUTIC ACTIVITIES: CPT | Mod: GP

## 2018-08-01 PROCEDURE — 40000193 ZZH STATISTIC PT WARD VISIT

## 2018-08-01 PROCEDURE — 99231 SBSQ HOSP IP/OBS SF/LOW 25: CPT | Performed by: INTERNAL MEDICINE

## 2018-08-01 PROCEDURE — 25000132 ZZH RX MED GY IP 250 OP 250 PS 637: Performed by: INTERNAL MEDICINE

## 2018-08-01 PROCEDURE — 85018 HEMOGLOBIN: CPT | Performed by: INTERNAL MEDICINE

## 2018-08-01 PROCEDURE — 85018 HEMOGLOBIN: CPT | Performed by: ORTHOPAEDIC SURGERY

## 2018-08-01 PROCEDURE — 80048 BASIC METABOLIC PNL TOTAL CA: CPT | Performed by: ORTHOPAEDIC SURGERY

## 2018-08-01 PROCEDURE — 25000128 H RX IP 250 OP 636: Performed by: ORTHOPAEDIC SURGERY

## 2018-08-01 PROCEDURE — 99207 ZZC CDG-MDM COMPONENT: MEETS LOW - DOWN CODED: CPT | Performed by: INTERNAL MEDICINE

## 2018-08-01 PROCEDURE — 12000000 ZZH R&B MED SURG/OB

## 2018-08-01 PROCEDURE — 36415 COLL VENOUS BLD VENIPUNCTURE: CPT | Performed by: INTERNAL MEDICINE

## 2018-08-01 PROCEDURE — 36415 COLL VENOUS BLD VENIPUNCTURE: CPT | Performed by: ORTHOPAEDIC SURGERY

## 2018-08-01 PROCEDURE — 25000132 ZZH RX MED GY IP 250 OP 250 PS 637: Performed by: ORTHOPAEDIC SURGERY

## 2018-08-01 RX ORDER — FERROUS SULFATE 325(65) MG
325 TABLET ORAL 2 TIMES DAILY
Status: DISCONTINUED | OUTPATIENT
Start: 2018-08-01 | End: 2018-08-02 | Stop reason: HOSPADM

## 2018-08-01 RX ORDER — POLYETHYLENE GLYCOL 3350 17 G/17G
17 POWDER, FOR SOLUTION ORAL 2 TIMES DAILY PRN
Status: DISCONTINUED | OUTPATIENT
Start: 2018-08-01 | End: 2018-08-02 | Stop reason: HOSPADM

## 2018-08-01 RX ADMIN — HYDROXYZINE HYDROCHLORIDE 10 MG: 10 TABLET ORAL at 20:41

## 2018-08-01 RX ADMIN — FERROUS SULFATE TAB 325 MG (65 MG ELEMENTAL FE) 325 MG: 325 (65 FE) TAB at 20:42

## 2018-08-01 RX ADMIN — ACETAMINOPHEN 975 MG: 325 TABLET, FILM COATED ORAL at 14:53

## 2018-08-01 RX ADMIN — ENOXAPARIN SODIUM 40 MG: 40 INJECTION SUBCUTANEOUS at 05:48

## 2018-08-01 RX ADMIN — Medication 1 LOZENGE: at 04:37

## 2018-08-01 RX ADMIN — ACETAMINOPHEN 975 MG: 325 TABLET, FILM COATED ORAL at 22:12

## 2018-08-01 RX ADMIN — HYDROMORPHONE HYDROCHLORIDE 2 MG: 2 TABLET ORAL at 20:41

## 2018-08-01 RX ADMIN — HYDROMORPHONE HYDROCHLORIDE 2 MG: 2 TABLET ORAL at 04:36

## 2018-08-01 RX ADMIN — GABAPENTIN 300 MG: 300 CAPSULE ORAL at 08:06

## 2018-08-01 RX ADMIN — GABAPENTIN 300 MG: 300 CAPSULE ORAL at 20:41

## 2018-08-01 RX ADMIN — ACETAMINOPHEN 975 MG: 325 TABLET, FILM COATED ORAL at 05:48

## 2018-08-01 RX ADMIN — HYDROMORPHONE HYDROCHLORIDE 2 MG: 2 TABLET ORAL at 22:11

## 2018-08-01 RX ADMIN — SENNOSIDES AND DOCUSATE SODIUM 1 TABLET: 8.6; 5 TABLET ORAL at 08:06

## 2018-08-01 ASSESSMENT — ACTIVITIES OF DAILY LIVING (ADL)
ADLS_ACUITY_SCORE: 25
ADLS_ACUITY_SCORE: 26

## 2018-08-01 ASSESSMENT — PAIN DESCRIPTION - DESCRIPTORS
DESCRIPTORS: CONSTANT;DISCOMFORT

## 2018-08-01 NOTE — PLAN OF CARE
Problem: Patient Care Overview  Goal: Plan of Care/Patient Progress Review  Outcome: No Change  Vital signs stable. Slept in recliner until approximately 0430. Encouraged patient to go to bed- with assistance of 3 staff and using lata steady, back to bed. Dilaudid 2mg given for complaints of buttocks pain rating of 8. Patient extremely anxious about moving and pain. Snyder patent with increased urine output-350ml this shift. Leaving snyder in place and waiting for direction from MD-due to immobility.

## 2018-08-01 NOTE — PROGRESS NOTES
[facility]  Hospitalist Progress Note  Froy Krishnamurthy MD 08/01/2018    Reason for Stay (Diagnosis): Post right total hip arthroplasty.         Assessment and Plan:      Summary of Stay: Cristy Bailey is a 73 year old female admitted on 7/30/2018 post right total hip arthroplasty.    Problem List:     1. S/P right total hip arthroplasty:   -Patient underwent surgery today.   -Continue pain medications per orthopedic surgery     2. Hypotension:   -Blood pressure remained on the low side, hemoglobin also dropped this could be due to blood loss anemia.   -Continue to hold off blood pressure medication.   -Patient is a symptomatic  -Currently blood pressure is 100/40    3.  Acute blood loss anemia- This is likely postop and also some dilutional component.  - Hemoglobin dropped from 14.6 to 8.9.  -This is significant drop in hemoglobin  -Transfuse for hemoglobin less than 7    # Pain Assessment:  Current Pain Score 8/1/2018   Patient currently in pain? -   Pain score (0-10) 6   Pain location -   Pain descriptors -   - Cristy is experiencing pain due to postop. Pain management was discussed and the plan was created in a collaborative fashion.  Cristy's response to the current recommendations: engaged  - Please see the plan for pain management as documented above    DVT Prophylaxis: Defer to primary service.  Code Status: Full Code.  Discharge Dispo: Pending PT, likely to TCU.  Estimated Disch Date / # of Days until Disch: Per orthopedic surgery when blood pressure is stable, hemoglobin is stable, we will reevaluate the patient in the morning.  I discussed with patient and nursing staff.      Interval History (Subjective):      Patient seen and examined, feels better, tolerating oral intake, at baseline uses a walker, requiring 2 assist to transfer this time.                  Physical Exam:      Last Vital Signs:  /41 (BP Location: Left arm)  Temp 97.7  F (36.5  C) (Oral)  Resp 18  SpO2  96%    I/O last 3 completed shifts:  In: 1050 [P.O.:1050]  Out: 600 [Urine:600]  Current Facility-Administered Medications   Medication     [START ON 8/2/2018] acetaminophen (TYLENOL) tablet 650 mg     acetaminophen (TYLENOL) tablet 975 mg     benzocaine-menthol (CHLORASEPTIC) 6-10 MG lozenge 1-2 lozenge     enoxaparin (LOVENOX) injection 40 mg     gabapentin (NEURONTIN) capsule 300 mg     HYDROmorphone (DILAUDID) tablet 2-4 mg     HYDROmorphone (PF) (DILAUDID) injection 0.5-1 mg     hydrOXYzine (ATARAX) tablet 10 mg     lactated ringers infusion     lidocaine (LMX4) kit     lidocaine 1 % 1 mL     naloxone (NARCAN) injection 0.1-0.4 mg     ondansetron (ZOFRAN-ODT) ODT tab 4 mg    Or     ondansetron (ZOFRAN) injection 4 mg     senna-docusate (SENOKOT-S;PERICOLACE) 8.6-50 MG per tablet 1 tablet    Or     senna-docusate (SENOKOT-S;PERICOLACE) 8.6-50 MG per tablet 2 tablet     sodium chloride (PF) 0.9% PF flush 3 mL     sodium chloride (PF) 0.9% PF flush 3 mL     sodium chloride (PF) 0.9% PF flush 3 mL     sodium chloride 0.9% infusion     traZODone (DESYREL) half-tab 25 mg       Constitutional: Awake, alert, cooperative, no apparent distress   Respiratory: Clear to auscultation bilaterally, no crackles or wheezing   Cardiovascular: Regular rate and rhythm, normal S1 and S2, and no murmur noted   Abdomen: Normal bowel sounds, soft, non-distended, non-tender   Skin: No rashes, no cyanosis, dry to touch   Neuro: Alert and oriented x3, no weakness, numbness, memory loss   Extremities: No edema, normal range of motion   Other(s):HEENT        All other systems: Negative          Medications:      All current medications were reviewed with changes reflected in problem list.         Data:      All new lab and imaging data was reviewed.   Labs:    Recent Labs  Lab 08/01/18  0737 07/30/18  1353 07/28/18  1028     --  143   POTASSIUM 3.8  --  4.0   CHLORIDE 106  --  106   CO2 27  --  29   ANIONGAP 6  --  8   *  --   101*   BUN 24  --  17   CR 0.69 0.60 0.60   GFRESTIMATED 84 >90 >90   GFRESTBLACK >90 >90 >90   ASHIA 7.5*  --  8.8       Recent Labs  Lab 08/01/18  0737 07/31/18  0604 07/30/18  1353 07/28/18  1028   HGB 8.9* 10.8*  --  14.6   PLT  --   --  153  --       Imaging:   Results for orders placed or performed during the hospital encounter of 07/30/18   XR Pelvis 1/2 Views    Narrative    XR PELVIS ONE VIEW 7/30/2018 12:46 PM    HISTORY: Post Op Hip Arthroplasty;     COMPARISON: 4/30/2018 AP pelvis      Impression    IMPRESSION:   Limited view of the mid to lower pelvis demonstrates a new right hip  arthroplasty in near anatomic alignment on single AP view..    KARLENE TATE MD

## 2018-08-01 NOTE — PROGRESS NOTES
SWS     D: Discharge planning continuing..      I: Met with pt who has asked that medical transport be arranged for her for the transfer to [facility] which is anticipated for tomorrow. Discussed with pt that her insurance does not cover w/c transport and that the cost of the transport is $70/base and $4.50/mi which she acknowledges and accepts. [facility], w/c arranged for 1300 tomorrow.      A/P: Anticipate no problem with arrangements made for transfer tomorrow, SW available until discharge should adjustments be needed in plan as noted.

## 2018-08-01 NOTE — PROGRESS NOTES
Your information has been submitted on August 01st, 2018 at 11:05:39 AM CDT. The confirmation number is [id number]

## 2018-08-01 NOTE — PLAN OF CARE
Problem: Patient Care Overview  Goal: Plan of Care/Patient Progress Review  PT: Assessed BP prior to session 95/38. Will hold PT at this time and check back as schedule allows.     Pt agreeable on PM check back.    Discharge Planner PT   Patient plan for discharge: TCU  Current status: Supine to sit with mod to max A x 2. Good static seated balance. Sit to/from  Mariah Steady with mod A x 2 from elevated bed height. Bed to chair via Mariah Steady and Ax2. Pt very anxious throughout session; needs frequent encouragement.   Barriers to return to prior living situation: Significant concerns for safety if pt returns home as pt described unsafe transfer techniques on evaluation and also sounds that pt requires max A+ for transfers from Leo.  She also describes significant pain (yelling in pain) with all transfers prior to admission (L and R hips).  Recommendations for discharge: TCU  Rationale for recommendations: Pt would benefit from PT/OT services on discharge to maximize IND/safety with mobility prior to returning home wth assist from her friend Leo. Currently not moving safe enough to return home.        Entered by: Justine Carrillo 08/01/2018 2:01 PM

## 2018-08-01 NOTE — PROGRESS NOTES
[facility]  Orthopedics Progress Note             Interval History:     73 year old female admitted postoperatively for elective Right JENAE,  with Dr. Garcia due to DJD unresponsive to non operative treatment.  There were no intraoperative complications.  Patient currently Post op day #2.    Patient reports pain manageable.  Eating, catheter in place, resting, tapering pain medication, confident in TCU, some concern about Hypotension.  Denies SOB, syncopal tendency, dizzy, chest pain.  No new complaints.     Pain: tolerable at rest.   Nausea: none.   Light headedness : none  Chest pain: none  Shortness of breath: none              Assessment and Plan:   R JENAE, Day #2.  Mild Hypotension, however asymptomatic, no reactive tachy, and no signs of ongoing hemorrhage such as dressing saturation or excessive ecchymosis.  Otherwise VSS, hemodynamically asymptomatic, pain management adequate.    PLAN:  Monitor hypotension - minimize opioids, hold lisinopril.    Monitor hgb and anemia symptoms.  DVT proph.  PT/OT as tolerated, can do lower leg passive motion in bed if needed.  Hip restrictions.  Hospitalist cares.   for placement.                  Physical Exam:   Patient Vitals for the past 12 hrs:   BP Temp Temp src Heart Rate Resp SpO2   08/01/18 1533 108/47 99.7  F (37.6  C) Axillary 94 16 95 %   08/01/18 1046 100/41 - - 88 - 96 %   08/01/18 1008 (!) 95/38 - - - - -   08/01/18 0913 (!) 95/37 - - - - -   08/01/18 0759 (!) 75/42 - - - - -   08/01/18 0756 (!) 88/40 - Oral 92 18 97 %     Hemoglobin   Date Value Ref Range Status   08/01/2018 9.1 (L) 11.7 - 15.7 g/dL Final   08/01/2018 8.9 (L) 11.7 - 15.7 g/dL Final     Mild palor in mucous membranes.  Patient is alert and oriented.  Vitals: stable  Neurovascular status: intact  Dressing: clean and dry  Calves: soft and non tender          Donal Grover PA-C  [facility] - Surgery    8/1/2018

## 2018-08-01 NOTE — PROGRESS NOTES
Clinic Care Coordination Contact    Situation: Patient chart reviewed by care coordinator.    Background: Patient due for outreach call.     Assessment: Patient had hip replacement surgery and planning on discharge to TCU at [facility] on Thursday.      Plan/Recommendations: When discharged from hospital, assess needs.     Francisca Hernandez,   [facility]  [email or web address]  [phone]

## 2018-08-02 ENCOUNTER — TELEPHONE (OUTPATIENT)
Dept: ENDOCRINOLOGY | Facility: CLINIC | Age: 73
End: 2018-08-02

## 2018-08-02 VITALS
TEMPERATURE: 97.4 F | DIASTOLIC BLOOD PRESSURE: 43 MMHG | SYSTOLIC BLOOD PRESSURE: 99 MMHG | RESPIRATION RATE: 16 BRPM | OXYGEN SATURATION: 96 %

## 2018-08-02 LAB
HGB BLD-MCNC: 8.8 G/DL (ref 11.7–15.7)
PLATELET # BLD AUTO: 188 10E9/L (ref 150–450)

## 2018-08-02 PROCEDURE — 25000132 ZZH RX MED GY IP 250 OP 250 PS 637: Performed by: INTERNAL MEDICINE

## 2018-08-02 PROCEDURE — 85049 AUTOMATED PLATELET COUNT: CPT | Performed by: ORTHOPAEDIC SURGERY

## 2018-08-02 PROCEDURE — 25000132 ZZH RX MED GY IP 250 OP 250 PS 637: Performed by: ORTHOPAEDIC SURGERY

## 2018-08-02 PROCEDURE — 99232 SBSQ HOSP IP/OBS MODERATE 35: CPT | Performed by: INTERNAL MEDICINE

## 2018-08-02 PROCEDURE — 85018 HEMOGLOBIN: CPT | Performed by: ORTHOPAEDIC SURGERY

## 2018-08-02 PROCEDURE — 36415 COLL VENOUS BLD VENIPUNCTURE: CPT | Performed by: ORTHOPAEDIC SURGERY

## 2018-08-02 PROCEDURE — 25000128 H RX IP 250 OP 636: Performed by: ORTHOPAEDIC SURGERY

## 2018-08-02 RX ORDER — AMOXICILLIN 250 MG
1 CAPSULE ORAL 2 TIMES DAILY PRN
Qty: 100 TABLET | DISCHARGE
Start: 2018-08-02 | End: 2018-09-21

## 2018-08-02 RX ORDER — ACETAMINOPHEN 325 MG/1
975 TABLET ORAL EVERY 8 HOURS
Qty: 100 TABLET | DISCHARGE
Start: 2018-08-02 | End: 2018-10-05

## 2018-08-02 RX ORDER — FERROUS SULFATE 325(65) MG
325 TABLET ORAL 2 TIMES DAILY
Qty: 100 TABLET | DISCHARGE
Start: 2018-08-02 | End: 2018-10-05

## 2018-08-02 RX ORDER — ASPIRIN 325 MG
325 TABLET ORAL DAILY
Qty: 45 TABLET | Refills: 0 | Status: ON HOLD | DISCHARGE
Start: 2018-08-02 | End: 2019-02-14

## 2018-08-02 RX ORDER — BISACODYL 10 MG
10 SUPPOSITORY, RECTAL RECTAL DAILY PRN
Qty: 25 SUPPOSITORY | Refills: 1 | DISCHARGE
Start: 2018-08-02 | End: 2019-01-22

## 2018-08-02 RX ORDER — POLYETHYLENE GLYCOL 3350 17 G/17G
17 POWDER, FOR SOLUTION ORAL 2 TIMES DAILY PRN
Qty: 7 PACKET | DISCHARGE
Start: 2018-08-02 | End: 2018-10-05

## 2018-08-02 RX ORDER — HYDROMORPHONE HYDROCHLORIDE 2 MG/1
2 TABLET ORAL EVERY 4 HOURS PRN
Qty: 40 TABLET | Refills: 0 | Status: ON HOLD | OUTPATIENT
Start: 2018-08-02 | End: 2018-08-31

## 2018-08-02 RX ADMIN — SENNOSIDES AND DOCUSATE SODIUM 1 TABLET: 8.6; 5 TABLET ORAL at 08:26

## 2018-08-02 RX ADMIN — ACETAMINOPHEN 975 MG: 325 TABLET, FILM COATED ORAL at 06:35

## 2018-08-02 RX ADMIN — ENOXAPARIN SODIUM 40 MG: 40 INJECTION SUBCUTANEOUS at 06:36

## 2018-08-02 RX ADMIN — HYDROMORPHONE HYDROCHLORIDE 2 MG: 2 TABLET ORAL at 05:00

## 2018-08-02 RX ADMIN — GABAPENTIN 300 MG: 300 CAPSULE ORAL at 08:26

## 2018-08-02 RX ADMIN — FERROUS SULFATE TAB 325 MG (65 MG ELEMENTAL FE) 325 MG: 325 (65 FE) TAB at 08:25

## 2018-08-02 ASSESSMENT — PAIN DESCRIPTION - DESCRIPTORS
DESCRIPTORS: CONSTANT;DISCOMFORT
DESCRIPTORS: CONSTANT;DISCOMFORT

## 2018-08-02 ASSESSMENT — ACTIVITIES OF DAILY LIVING (ADL)
ADLS_ACUITY_SCORE: 29

## 2018-08-02 NOTE — TELEPHONE ENCOUNTER
To schedulers: Please schedule  for lst available endocrine. Preferred provider Genny Shore MD  Endocrine triage      New Referral Documentation:    Referring Provider: Vivienne    Referring Clinic: Neurology     Reason for Referral: Goiter

## 2018-08-02 NOTE — DISCHARGE SUMMARY
[facility]  Discharge Summary            Interval History:     73 year old female admitted postoperatively for elective Right total hip athroplasty  with Dr. DILMA Garcia due to DJD unresponsive to non operative treatment.  There were no notable intraoperative complications.  Patient being discharged post op Day #3.  Cristy Bailey received skilled nursing, PT, OT, SW inquiry.  There was also Hospitalist care during the stay.  Pt had some asymptomatic hypotension.  This seems to be resolving.   Cristy Bailey has progressed satisfactorily with ambulation and pain management and without medical complication.  Patient is confident in their discharge and has paritially met goals with PT and OT. Pt lives at home and will be discharged to TCU based on home living conditions and level of support.  Cristy Bailey leaves the hospital in stable condition with fair chance for recovery.  Today: still has baseline pain in bilateral legs, but no pain at hip.  Transferring, eating, resting.  No new complaints.     Pain: tolerable at hip.  Nausea: none.   Light headedness : none  Chest pain: none  Shortness of breath: none              Assessment and Plan:   S/P R JENAE Day #3.  Final Diagnosis: same.   VSS, Hemodynamically asymptomatic, pain management adequate.    PLAN:  DVT prophylaxis with aspirin daily, as patient has no history of VTE.  Pain management with tylenol and dilaudid.  Sennakot and miralax.  Fe therapy.    Patient instructions attached to discharge.      Discharge to TCU  Follow up in clinic as previously scheduled, approx 10-14 days post op.    [facility]  [facility]  [facility]  [address]  [phone]                    Physical Exam:   Patient Vitals for the past 12 hrs:   BP Temp Temp src Heart Rate Resp SpO2   08/02/18 0829 99/43 - - - - -   08/02/18 0748 (!) 99/25 97.4  F (36.3  C) Oral 89 16 96 %   08/02/18 0008 98/51 99  F (37.2  C) Oral 81 16 95 %     Hemoglobin   Date  Value Ref Range Status   08/02/2018 8.8 (L) 11.7 - 15.7 g/dL Final   08/01/2018 9.1 (L) 11.7 - 15.7 g/dL Final       Patient is alert and oriented.  Vitals: stable  Neurovascular status: intact  Dressing: clean and dry  Calves: soft and non tender          Donal Grover PA-C  [facility] - Surgery    8/2/2018

## 2018-08-02 NOTE — PLAN OF CARE
Problem: Patient Care Overview  Goal: Plan of Care/Patient Progress Review  Pt alert and oriented - anxious at times  Up with assist x2-3 with gait belt and lata steady.  Hand massage services provided today.  Voiding, passing gas - no BM  R hip dressing reinforced.  Discharge to [facility] today via [facility] wheelchair transport.

## 2018-08-02 NOTE — DISCHARGE INSTRUCTIONS
POST OP TOTAL HIP INSTRUCTION:    Incision care:  To close the incision, staples will be used in most cases. The staples will be removed at the office in 10-14 days from the surgery. If you are going to a TCU (nursing home) from the hospital, the staples can be removed at the nursing home in that time frame. For first 3 days, place a water proof cover over the dressing for showers. If the dressings get wet, change it with new gauze and paper tape. The incision can be wet after 4 days from the surgery.  You can take a shower but do not soak the incision. The incision should be covered with a light gauze that is held by 2 inch paper tape until follow up.     If you have an aquacell dressing, (flesh colored rubber like bandage), you may leave this dressing in place until follow up in the clinic, unless; the dressing becomes completely saturated with blood, is leaking, gets water inside as evident by moisture appearing on the inside of the dressing, or you have a skin reaction.  In this case you should remove and use dressings like what is mentioned above.    Soft tissue: It is not unusual to have swelling in the leg (thigh down to the foot) up to 2 months from the surgery. Frequent ankle pumping, elevation of the leg above the heart level and gentle compression support with ace wrap should help with swelling. Icing regularly, for 20 minutes every hour, will also help with swelling and pain.  Due to soft tissue swelling, increased amount of redness around the incision site is also commonly seen. Progressively worsening redness along with increasing pain or increasing drainage from the wound should be a reason to alert us immediately. You may also experience bruising.  This may occur anywhere from your toes, through the leg and even onto your torso.  It may show up even 1 week after surgery.      Medication:You will be discharged from the hospital with pain medication(s). In most cases, consistent use the pain pills can  be limited to a few days. After this period, the medication should be weaned off as soon as possible to avoid potential complications of constipation, nausea, or rash, etc. Until you can be completely off the pain pills, using them only at nighttime along with controlling the pain with over-the-counter medication(s) such as Ibuprofen/Naproxen and/or Tylenol during the day would be a good way of managing the pain.       In order to minimize the potential problem of blood clot, you'll be asked to take aspirin (low risk patients) or undergo a period of blood thinner injections. If you were on Coumadin before the surgery you will be going back to it after the surgery.    It is also recommend you take a stool softener while taking opioid prescription pain mediation to avoid constipation.  Also stay well hydrated in general during this period.    Activities:  One of the main problems related to the hip replacement is that there is 1-3% chance for dislocation. This means the ball comes out of the socket.  This is due to the fact that the size of the femoral head is going to be smaller than the native femoral head and also due to the fact that the hip capsule which supports the joint is disrupted and repaired to perform the surgery.  As a result, there is a lack of a protecting barrier until it is re-established with healing of the capsule, which takes a minimum of 4 months.  For this reason, you are asked to avoid bending your hip greater than 90 degrees of flexion for the first 4 months. This also means avoiding sitting on a very plush chair/couch and not crossing the legs.    As long as there is no fracture complication, you can put all the weight on the operated leg. Walker/crutches can be discontinued according to your own progress. The general guideline is to discontinue the walker/crutches when you feel fairly comfortable and confident with your balance. You can return to walking, swimming, golfing, double tennis but  jogging or running are not recommended.     You can lie on either side of your body as soon as you feel comfortable putting pressure on the incision. We recommend putting one or 2 pillows in between the legs when you lie on that side.     Please do not undergo procedures such as dental cleaning, oral surgery, colonoscopy as well as any type of surgery that involves a significant incision for 4 months after surgery.  In case of an emergency, you will need an antibiotic, usually amoxicillin or clindamycin is used for this purpose.  You'll be taking the medication one hour before the procedure.  Please let the provider know that you have artificial implants in your body    Also, for the rest of your life, some providers recommend that you take an antibiotic for procedures such as dental cleaning, oral surgery, colonoscopy as well as any type of surgery that involves a significant incision.  Please check with that provider.    Donal Grover PA-C  [facility] - Surgery  [facility]  [address] [place]  [address]  [phone]

## 2018-08-02 NOTE — PROGRESS NOTES
[facility]  Hospitalist Progress Note  Froy Krishnamurthy MD 08/02/2018    Reason for Stay (Diagnosis): Post right total hip arthroplasty.         Assessment and Plan:      Summary of Stay: Cristy Bailey is a 73 year old female admitted on 7/30/2018 post right total hip arthroplasty.  While in the hospital patient developed hypotension, her blood pressure medications discontinued get a litre bolus of IV fluid, hemoglobin also decreased significantly from 14-8.8.  Currently patient is stable and asymptomatic  Problem List:     1. S/P right total hip arthroplasty on 07/30/2018.   -Continue pain medications per orthopedic surgery     2. Hypotension:   -Blood pressure remained on the low side, hemoglobin also dropped this could be due to blood loss anemia.   -Continue to hold off blood pressure medication.   -Patient is a symptomatic  -Currently blood pressure is 100/40    3.  Acute blood loss anemia- This is likely postop and also some dilutional component.  - Hemoglobin dropped from 14.6 to 8.9.  -This is significant drop in hemoglobin  -Transfuse for hemoglobin less than 7.  -Started on ferrous sulfate    4.  Constipation  -Continue stool softeners  -Needs to be managed proactivily as pain medication and iron can exacerbate it.    # Pain Assessment:  Current Pain Score 8/2/2018   Patient currently in pain? yes   Pain score (0-10) 6   Pain location -   Pain descriptors -   - Cristy is experiencing pain due to postop. Pain management was discussed and the plan was created in a collaborative fashion.  Cristy's response to the current recommendations: engaged  - Please see the plan for pain management as documented above    DVT Prophylaxis: Defer to primary service.  Code Status: Full Code.  Discharge Dispo: To TCU today.  Estimated Disch Date / # of Days until Disch: Today.    I discussed with patient, her  and nursing staff the plan of discharge.      Interval History (Subjective):       Patient seen and examined, feels better, tolerating oral intake, at baseline uses a walker, requiring  assist to transfer this time.  She is being discharged to transitional care unit                  Physical Exam:      Last Vital Signs:  BP 99/43 (BP Location: Left arm)  Temp 97.4  F (36.3  C) (Oral)  Resp 16  SpO2 96%    I/O last 3 completed shifts:  In: 540 [P.O.:540]  Out: 600 [Urine:600]  Current Facility-Administered Medications   Medication     acetaminophen (TYLENOL) tablet 650 mg     acetaminophen (TYLENOL) tablet 975 mg     benzocaine-menthol (CHLORASEPTIC) 6-10 MG lozenge 1-2 lozenge     enoxaparin (LOVENOX) injection 40 mg     ferrous sulfate (IRON) tablet 325 mg     gabapentin (NEURONTIN) capsule 300 mg     HYDROmorphone (DILAUDID) tablet 2-4 mg     HYDROmorphone (PF) (DILAUDID) injection 0.5-1 mg     hydrOXYzine (ATARAX) tablet 10 mg     lactated ringers infusion     lidocaine (LMX4) kit     lidocaine 1 % 1 mL     naloxone (NARCAN) injection 0.1-0.4 mg     ondansetron (ZOFRAN-ODT) ODT tab 4 mg    Or     ondansetron (ZOFRAN) injection 4 mg     polyethylene glycol (MIRALAX/GLYCOLAX) Packet 17 g     senna-docusate (SENOKOT-S;PERICOLACE) 8.6-50 MG per tablet 1 tablet    Or     senna-docusate (SENOKOT-S;PERICOLACE) 8.6-50 MG per tablet 2 tablet     sodium chloride (PF) 0.9% PF flush 3 mL     sodium chloride (PF) 0.9% PF flush 3 mL     sodium chloride (PF) 0.9% PF flush 3 mL     sodium chloride 0.9% infusion     traZODone (DESYREL) half-tab 25 mg       Constitutional: Awake, alert, cooperative, no apparent distress   Respiratory: Clear to auscultation bilaterally, no crackles or wheezing   Cardiovascular: Regular rate and rhythm, normal S1 and S2, and no murmur noted   Abdomen: Normal bowel sounds, soft, non-distended, non-tender   Skin: No rashes, no cyanosis, dry to touch   Neuro: Alert and oriented x3, no weakness, numbness, memory loss   Extremities: No edema, normal range of motion    Other(s):HEENT        All other systems: Negative          Medications:      All current medications were reviewed with changes reflected in problem list.         Data:      All new lab and imaging data was reviewed.   Labs:    Recent Labs  Lab 08/01/18  0737 07/30/18  1353 07/28/18  1028     --  143   POTASSIUM 3.8  --  4.0   CHLORIDE 106  --  106   CO2 27  --  29   ANIONGAP 6  --  8   *  --  101*   BUN 24  --  17   CR 0.69 0.60 0.60   GFRESTIMATED 84 >90 >90   GFRESTBLACK >90 >90 >90   ASHIA 7.5*  --  8.8       Recent Labs  Lab 08/02/18  0633 08/01/18  1612 08/01/18  0737  07/30/18  1353   HGB 8.8* 9.1* 8.9*  < >  --      --   --   --  153   < > = values in this interval not displayed.   Imaging:   Results for orders placed or performed during the hospital encounter of 07/30/18   XR Pelvis 1/2 Views    Narrative    XR PELVIS ONE VIEW 7/30/2018 12:46 PM    HISTORY: Post Op Hip Arthroplasty;     COMPARISON: 4/30/2018 AP pelvis      Impression    IMPRESSION:   Limited view of the mid to lower pelvis demonstrates a new right hip  arthroplasty in near anatomic alignment on single AP view..    KARLENE TATE MD

## 2018-08-02 NOTE — PLAN OF CARE
Problem: Patient Care Overview  Goal: Plan of Care/Patient Progress Review  Outcome: Improving  Orientation: Alert and oriented x4  VSS. 95% on RA.   Tele: N/A. HR 81.   LS: Clear, denies SOB/FITZGERALD  GI: BS audible/active x4, tolerating PO. Passing gas. No BM this shift. Denies N/V.   : Adequate urine output. Yes, purewick in place.  Skin: Incision covered, dressing CDI. CMS intact.   Activity: Ax2-3 w/lata steady. Pt resting between cares.  Pain: 6-10/10. 4 mg PO dilaudid given this shift, 10 mg atarax, and scheduled Tylenol.   Plan: D/c to [facility]'s today via [facility] transport @ 1300. Continue with current cares.

## 2018-08-02 NOTE — PLAN OF CARE
Problem: Patient Care Overview  Goal: Plan of Care/Patient Progress Review      Physical Therapy Discharge Summary    Reason for therapy discharge:    Discharged to transitional care facility.    Progress towards therapy goal(s). See goals on Care Plan in [facility] electronic health record for goal details.  Goals not met.  Barriers to achieving goals:   discharge from facility.    Therapy recommendation(s):    Continued therapy is recommended.  Rationale/Recommendations:  TCU to maximize indep and safety with mobility.       Note: Pt not seen by documenting PT on this date. Information obtained from chart review.

## 2018-08-03 ENCOUNTER — TELEPHONE (OUTPATIENT)
Dept: ORTHOPEDICS | Facility: CLINIC | Age: 73
End: 2018-08-03

## 2018-08-03 NOTE — TELEPHONE ENCOUNTER
Khadijah RN at rehab facility, was calling to clarify dosage of the prophylactic aspirin.    Call back at     Briana Haro MS, ATC

## 2018-08-03 NOTE — PROGRESS NOTES
Clinic Care Coordination Contact  [facility]/Voicemail       Clinical Data: Care Coordinator Outreach  Patient discharged yesterday to [facility].    Outreach attempted x 1.  Left message on voicemail with call back information and requested return call.  Plan:  Care Coordinator will try to reach patient again in 3-5 business days.  Francisca Hernandez,   [facility]  [email or web address]  [phone]

## 2018-08-03 NOTE — OP NOTE
Procedure Date: 07/30/2018      SURGEON:  Bry Garcia MD      ASSISTANT:  David Grover PA-C      ANESTHESIA:  General.      PREOPERATIVE DIAGNOSIS:  Avascular necrosis of right hip.      POSTOPERATIVE DIAGNOSIS:  Avascular necrosis of right hip.      PROCEDURE PERFORMED:  Right total hip arthroplasty.      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      OPERATIVE NOTE:  Cristy was brought to the operating room, placed in a supine position on the operating table, where general anesthesia was administered without difficulty.  She was then placed in a left lateral decubitus position, carefully padding all dependent appendages.  Her right hip and lower extremity were scrubbed and prepped in the usual sterile fashion and draped sterilely.  Using a posterolateral approach, the skin and subcutaneous tissue were dissected sharply down to the gluteus onelia and IT band, which were split along their fibers.  Dissection was carried around the posterior aspect of the greater trochanter where the short external rotators and posterior hip capsule were opened in a T-fashion and tagged with #1 Ethibond suture.  The hip was dislocated and femoral neck was cut at 15 mm above the lesser trochanter as preoperatively planned.  Acetabulum was reamed up to a size 54 mm acetabulum and was fitted with a Smith [employer]hew ingrowth acetabulum and fixed with a single screw.  A 20-degree superior and posterior poly liner was fitted into the shell.      Our attention was turned to the femoral side, where sequential reaming and broaching allowed for a size 14 ingrowth femur with a high offset neck.  This was reduced with a 32-mm head with a +8 mm neck length which gave the best stability and range of motion.  The trial  components were removed and the prosthetic size 14 ingrowth femur with a high offset neck was then fitted into the femoral canal.  A 32-mm chrome head with a +8 mm neck length was then fitted to the trunnion and the hip was reduced one final time.  The wound was pulse-lavaged with normal saline and was closed in a routine layered fashion, including reattachment of the short external rotators to the greater trochanter through drill holes.  The wound was dressed sterilely.  Cristy tolerated the procedure well and left the operating room in satisfactory and stable condition.      ESTIMATED BLOOD LOSS:  750 mL      SPONGE AND NEEDLE COUNT:  Correct x2.         CRYSTAL SAVAGE MD             D: 2018   T: 2018   MT: CANDE      Name:     CRISTY JONES   MRN:      [id number]        Account:        [id number]   :      1945           Procedure Date: 2018      Document: [id number]

## 2018-08-06 ENCOUNTER — TELEPHONE (OUTPATIENT)
Dept: ORTHOPEDICS | Facility: CLINIC | Age: 73
End: 2018-08-06

## 2018-08-06 NOTE — TELEPHONE ENCOUNTER
I looks like we had lab results faxed to us on 8/4/18 pm. I will have Dr. Garcia check in with her today when he gets in.     Keira Santiago, ATC

## 2018-08-06 NOTE — TELEPHONE ENCOUNTER
Rehab facility called back to make us aware of labs.   I told her we will call them back after Dr. Garcia reviews them this afternoon.

## 2018-08-06 NOTE — TELEPHONE ENCOUNTER
Informed [facility] staff of message below.     Closing encounter. No further action required.

## 2018-08-06 NOTE — TELEPHONE ENCOUNTER
Dr. Garcia reviewed the labs from 8/4/18.  He states the labs are normal or as expected for post-operative.  He suggests requesting a pain management consult if her pain does not improve.     Please inform [facility] staff.     Keira Santiago ATC

## 2018-08-06 NOTE — TELEPHONE ENCOUNTER
Nurse from rehab facility called with updates of patient status.     States patient was running a low grade fever over the weekend and also rating pain 10/10.    Treatments include 975 Tylenol 3 times a day and dilaudid prn. They have been changing the dressing and using ice continually as well.     The CNP ordered labs for the patient and will call us back with an update of the results.     No action required at this time.

## 2018-08-09 ENCOUNTER — TELEPHONE (OUTPATIENT)
Dept: ORTHOPEDICS | Facility: CLINIC | Age: 73
End: 2018-08-09

## 2018-08-09 ENCOUNTER — NURSE TRIAGE (OUTPATIENT)
Dept: NURSING | Facility: CLINIC | Age: 73
End: 2018-08-09

## 2018-08-09 ENCOUNTER — PATIENT OUTREACH (OUTPATIENT)
Dept: CARE COORDINATION | Facility: CLINIC | Age: 73
End: 2018-08-09

## 2018-08-09 ENCOUNTER — OFFICE VISIT (OUTPATIENT)
Dept: ORTHOPEDICS | Facility: CLINIC | Age: 73
End: 2018-08-09
Payer: COMMERCIAL

## 2018-08-09 DIAGNOSIS — Z47.89 ORTHOPEDIC AFTERCARE: Primary | ICD-10-CM

## 2018-08-09 PROCEDURE — 99024 POSTOP FOLLOW-UP VISIT: CPT | Performed by: PHYSICIAN ASSISTANT

## 2018-08-09 NOTE — TELEPHONE ENCOUNTER
Mariella from [facility] was the caller. [phone]    Staff at [facility] can remove Cristy's ajay if they get an order.  If planning to send a PA to remove those in a week please let Mariella know.  Routed: P 58715  Sports  Beatriz CUBA RN [facility]

## 2018-08-09 NOTE — MR AVS SNAPSHOT
After Visit Summary   8/9/2018    Cristy Bailey    MRN: [id number]           Patient Information     Date Of Birth          1945        Visit Information        Provider Department      8/9/2018 11:20 AM Donal Grover PA-C [facility] ORTHOPEDIC SURGERY        Today's Diagnoses     Orthopedic aftercare    -  1      Care Instructions    Incision appears normal, fat necrosis drainage, normal hyperemia.    CARES:  1. Incision - dressing changes daily, use absorptive dressings vs island to keep incision dry.  2.  Edema control - elevating, icing, compression at lower legs and feet.  3.  Taper pain meds as tolerated.   4.  Continue aspirin as written.  5.  Showering of incision recommended,  no soaking water.   6.  Staples to be removed in 1 week.  Will attempt to send REMY Koch PA-c    Follow up in 4 weeks or as needed.            Follow-ups after your visit        Follow-up notes from your care team     Return in about 4 weeks (around 9/6/2018).      Who to contact     If you have questions or need follow up information about today's clinic visit or your schedule please contact [facility] ORTHOPEDIC SURGERY directly at [phone].  Normal or non-critical lab and imaging results will be communicated to you by [employer], letter or phone within 4 business days after the clinic has received the results. If you do not hear from us within 7 days, please contact the clinic through [employer] or phone. If you have a critical or abnormal lab result, we will notify you by phone as soon as possible.  Submit refill requests through [employer] or call your pharmacy and they will forward the refill request to us. Please allow 3 business days for your refill to be completed.          Additional Information About Your Visit        MyChart Information     [employer] gives you secure access to your electronic health record. If you see a primary care provider, you can also send messages to your care team and  make appointments. If you have questions, please call your primary care clinic.  If you do not have a primary care provider, please call [phone] and they will assist you.        Care EveryWhere ID     This is your Care EveryWhere ID. This could be used by other organizations to access your [facility] medical records  [id number]         Blood Pressure from Last 3 Encounters:   08/02/18 99/43   07/28/18 113/64   07/26/18 142/86    Weight from Last 3 Encounters:   07/28/18 250 lb (113.4 kg)   07/26/18 255 lb (115.7 kg)   06/29/18 255 lb (115.7 kg)              Today, you had the following     No orders found for display       Primary Care Provider Office Phone # Fax #    Manoj Daley PA-C [phone]       [id number] [address] [facility] [id number]        Goals        General    Pain Management (pt-stated)     Notes - Note created  4/5/2018  2:53 PM by Francisca Hernandez LSW    Goal Statement: I will have less pain.  Measure of Success: Will have intervention that addresses my pain.   Supportive Steps to Achieve: CC will contact PCP to discuss patient concerns and next steps.   Barriers: Cannot leave the house without extreme effort. Is having extreme pain and does not have any sense that it will get better. Her  is her caregiver and he is frustrated with her pain.  Strengths: Has  who supports her.    Date to Achieve By: 9-1-2018        Equal Access to Services     HENNA WISE AH: Hadii braden guevarao Sosusie, waaxda luqadaha, qaybta kaalmada andrea, larry camp. So [facility] [phone].    ATENCIÓN: Si habla español, tiene a pompa disposición servicios gratuitos de asistencia lingüística. Llame al [phone].    We comply with applicable federal civil rights laws and [place] laws. We do not discriminate on the basis of race, color, national origin, age, disability, sex, sexual orientation, or gender identity.            Thank you!     Thank you for  choosing [facility] ORTHOPEDIC SURGERY  for your care. Our goal is always to provide you with excellent care. Hearing back from our patients is one way we can continue to improve our services. Please take a few minutes to complete the written survey that you may receive in the mail after your visit with us. Thank you!             Your Updated Medication List - Protect others around you: Learn how to safely use, store and throw away your medicines at www.disposemymeds.org.          This list is accurate as of 8/9/18 11:53 AM.  Always use your most recent med list.                   Brand Name Dispense Instructions for use Diagnosis    acetaminophen 325 MG tablet    TYLENOL    100 tablet    Take 3 tablets (975 mg) by mouth every 8 hours    S/P hip replacement, right       aspirin 325 MG tablet     45 tablet    Take 1 tablet (325 mg) by mouth daily    Venous thromboembolism (VTE) prophylaxis prescribed at discharge       bisacodyl 10 MG Suppository    DULCOLAX    25 suppository    Place 1 suppository (10 mg) rectally daily as needed for constipation    Constipation due to pain medication       ferrous sulfate 325 (65 Fe) MG tablet    IRON    100 tablet    Take 1 tablet (325 mg) by mouth 2 times daily    Other iron deficiency anemia       gabapentin 300 MG capsule    NEURONTIN    180 capsule    Take 1 capsule (300 mg) by mouth 2 times daily    Weakness of both lower extremities, Bilateral leg pain       HYDROmorphone 2 MG tablet    DILAUDID    40 tablet    Take 1 tablet (2 mg) by mouth every 4 hours as needed for other (pain control or improvement in physical function. Hold dose for analgesic side effects.)    S/P hip replacement, right       polyethylene glycol Packet    MIRALAX/GLYCOLAX    7 packet    Take 17 g by mouth 2 times daily as needed (constipation)    Constipation due to pain medication       senna-docusate 8.6-50 MG per tablet    SENOKOT-S;PERICOLACE    100 tablet    Take 1 tablet by mouth 2 times  daily as needed for constipation    Constipation due to pain medication

## 2018-08-09 NOTE — PROGRESS NOTES
Clinic Care Coordination Contact    Situation: Patient chart reviewed by care coordinator.    Background: Patient is at [facility] TCU.      Assessment: No need to do outreach.      Plan/Recommendations: CCC will send notification to [facility] asking for notice when patient discharges.      Francisca Hernandez,   [facility]  [email or web address]  [phone]

## 2018-08-09 NOTE — PROGRESS NOTES
HISTORY OF PRESENT ILLNESS:    Cristy Bailey is a 73 year old female who is seen in follow up for Right JENAE, DOS 7/30/2018, Dr. Garcia.  Present symptoms: Pain at left hip worse than right.  States had VTE bilateal and bilateral x ray at TCU, do not have results but pt states were negative.  Is not making much functional progress due to left hip pain. states swelling at legs are main complaint.  Plans for compression wraps.  Denies Chest pain, Calve pain, Fever, Chills.    Current Treatment: Post op, [facility] TCU, wheel chair, dilaudid, aspirin daily.    PHYSICAL EXAM:  There were no vitals taken for this visit.  There is no height or weight on file to calculate BMI.   GENERAL APPEARANCE: healthy, alert and no distress   PSYCH:  mentation appears normal and affect normal/bright    MSK:  Left: Hip. .  Ambulates: presents in WC..  Incision clean yellow drainage soaking island dressing dated 8/8/18,  staples present, healing.  Appropriate incisional erythema. blancheable hyperemia.   Yes Ecchymosis at hip.  No calve pain on palpation.  Edema moderate to severe calve through foot.  CMS: joesph incisional numbness, otherwise grossly intact.      IMAGING INTERPRETATION:  None today, post op views reviewed as normal.     ASSESSMENT:  Cristy Bailey is a 73 year old female S/P Right JENAE, DOS 7/30/2018, Dr. Garcia.    Adipose drainage at hip, no overt signs of infection.    PLAN:  - Surgery discussed, images reviewed if applicable, and all questions were answered at this time.  - staples were NOT removed.  -Care instructions given and verbally acknowledged.  - Medications: As current.  - Hip restrictions.  - will try to send NANETTE Koch out to see her next week for staples.    Return to clinic 4, 6, weeks.    Donal Grover PA-C    Dept. Orthopedic Surgery  [facility]   8/9/2018    +

## 2018-08-09 NOTE — LETTER
[facility]   To:   [facility]          Please give to facility    From:  Francisca Hernandez  [facility]  Care Coordinator [phone]   [facility]   P: [phone]  [email or web address]   Patient Name:  Cristy Bailey Date of Birth 1945:    Admit date: 8-2-2018      *Information Needed:  Please contact me when the patient will discharge (or if they will move to long term care)- include the discharge date, disposition, and main diagnosis   - If the patient is discharged with home care services, please provide the name of the agency    Also- Please inform me if a care conference is being held.   Phone, Fax or Email with information  Thank you, Francisca

## 2018-08-09 NOTE — PROGRESS NOTES
Clinic Care Coordination Contact  Care Team Conversations    Call from TONY Kruse in training at [facility].  Patient has not made much progress due to pain.  They have scheduled a care conference for August 21 at 1:30 PM.      Plan- assist with safe discharge plans.    Francisca Hernandez,   [facility]  [email or web address]  [phone]

## 2018-08-09 NOTE — TELEPHONE ENCOUNTER
Mariella from [facility] was the caller. [phone]    Staff at [facility] can remove Cristy's ajay if they get an order.  If planning to send a PA to remove those in a week please let Mariella know.  Routed: P 21233  Sports  Beatriz CUBA RN [facility]

## 2018-08-09 NOTE — PATIENT INSTRUCTIONS
Incision appears normal, fat necrosis drainage, normal hyperemia.    CARES:  1. Incision - dressing changes daily, use absorptive dressings vs island to keep incision dry.  2.  Edema control - elevating, icing, compression at lower legs and feet.  3.  Taper pain meds as tolerated.   4.  Continue aspirin as written.  5.  Showering of incision recommended,  no soaking water.   6.  Staples to be removed in 1 week.  Will attempt to send REMY Koch PA-c    Follow up in 4 weeks or as needed.

## 2018-08-09 NOTE — LETTER
8/9/2018         RE: Cristy Bailey  [address]  [place] [address]        Dear Colleague,    Thank you for referring your patient, Cristy Bailey, to the [facility] ORTHOPEDIC SURGERY. Please see a copy of my visit note below.    HISTORY OF PRESENT ILLNESS:    Cristy Bailey is a 73 year old female who is seen in follow up for Right JENAE, DOS 7/30/2018, Dr. Garcia.  Present symptoms: Pain at left hip worse than right.  States had VTE bilateal and bilateral x ray at TCU, do not have results but pt states were negative.  Is not making much functional progress due to left hip pain. states swelling at legs are main complaint.  Plans for compression wraps.  Denies Chest pain, Calve pain, Fever, Chills.    Current Treatment: Post op, [facility] TCU, wheel chair, dilaudid, aspirin daily.    PHYSICAL EXAM:  There were no vitals taken for this visit.  There is no height or weight on file to calculate BMI.   GENERAL APPEARANCE: healthy, alert and no distress   PSYCH:  mentation appears normal and affect normal/bright    MSK:  Left: Hip. .  Ambulates: presents in WC..  Incision clean yellow drainage soaking island dressing dated 8/8/18,  staples present, healing.  Appropriate incisional erythema. blancheable hyperemia.   Yes Ecchymosis at hip.  No calve pain on palpation.  Edema moderate to severe calve through foot.  CMS: joesph incisional numbness, otherwise grossly intact.      IMAGING INTERPRETATION:  None today, post op views reviewed as normal.     ASSESSMENT:  Cristy Bailey is a 73 year old female S/P Right JENAE, DOS 7/30/2018, Dr. Garcia.    Adipose drainage at hip, no overt signs of infection.    PLAN:  - Surgery discussed, images reviewed if applicable, and all questions were answered at this time.  - staples were NOT removed.  -Care instructions given and verbally acknowledged.  - Medications: As current.  - Hip restrictions.  - will try to send NANETTE Koch out to see her next week for  staples.    Return to clinic 4, 6, weeks.    Donal Grover PA-C    Dept. Orthopedic Surgery  [facility]   8/9/2018    +      Again, thank you for allowing me to participate in the care of your patient.        Sincerely,        Donal Grover PA-C

## 2018-08-10 NOTE — TELEPHONE ENCOUNTER
Will send NANETTE Koch next week for visit.    Donal Grover PA-C  [facility] - Surgery

## 2018-08-10 NOTE — TELEPHONE ENCOUNTER
Phone call [facility] and informed REMY Koch will be removing staples next week. She verbalized understanding and is familiar with who he is.     DIEGO Javed RN

## 2018-08-13 ENCOUNTER — TELEPHONE (OUTPATIENT)
Dept: ORTHOPEDICS | Facility: CLINIC | Age: 73
End: 2018-08-13

## 2018-08-13 NOTE — TELEPHONE ENCOUNTER
Received a voicemail from Mariella [facility], stating patient saw David Grover PA-C for a follow up visit. Paperwork states for patient to follow up in 4 weeks or prn. She is wanting to clarify if patient needs to follow up or not.     Phone call to Mariella, and informed per David, that patient should follow up once she is discharged from TCU and hopefully will be before 4 weeks.   Mariella will put an order in for patient to make an appointment for follow up once she is discharged from TCU.     DIEGO Javed RN

## 2018-08-15 ENCOUNTER — NURSING HOME VISIT (OUTPATIENT)
Dept: GERIATRICS | Facility: CLINIC | Age: 73
End: 2018-08-15
Payer: COMMERCIAL

## 2018-08-15 ENCOUNTER — MYC MEDICAL ADVICE (OUTPATIENT)
Dept: ORTHOPEDICS | Facility: CLINIC | Age: 73
End: 2018-08-15

## 2018-08-15 DIAGNOSIS — Z09 FOLLOW-UP EXAMINATION FOLLOWING SURGERY: Primary | ICD-10-CM

## 2018-08-15 NOTE — TELEPHONE ENCOUNTER
See [employer] message. She does not have follow-up appointment scheduled at this time. Won't Physical Therapy determine when she is discharged?      Please advise,    Keira Santiago, ATC

## 2018-08-15 NOTE — TELEPHONE ENCOUNTER
Yes, TCU facility should assess discharge readiness.  We can answer questions if needed.    Donal Grover PA-C  [facility] - Surgery

## 2018-08-15 NOTE — PROGRESS NOTES
"Ortho Nursing home visit    Cristy Baiely is a 73 year old female who resides at [facility] following JENAE Right. Now 2 weeks S/P Surgery; Patient has hs of severe DJD both hips, has not ambulated for some time due to hip pathology.    Patient is seen today for wound check, staple removal, on-site at care center.      Past Medical History:   Diagnosis Date     Arthritis     hip     HTN (hypertension) 1/2010      Leg weakness 3/24/2015     Lyme disease 1980'S AND 2004    lYME DZ LIKE SXS RESPONDED TO ABX      Past Surgical History:   Procedure Laterality Date     ARTHROPLASTY HIP Right 7/30/2018    Procedure: ARTHROPLASTY HIP;  Right total hip arthroplasty;  Surgeon: Bry Garcia MD;  Location: [facility] OR     BIOPSY OF UTERUS LINING  3/2010    negative.      C C-SEC ONLY,PREV C-SEC      c-sec x 3      COLONOSCOPY  2/21/10    benign findings. advised 10 yr f/u.         Allergies   Allergen Reactions     No Known Drug Allergies       There were no vitals taken for this visit.     Exam: Supine, allows PROM to Right LE; no complaints; Incision healing, small drops of serous drainage, no redness noted, staples removed, new drsg applied, today, no evidence of infection.      X-rays show : Right JENAE in good position and alignment     ASSESSMENT / PLAN:  Continue to work with PT /OT for ADL\"s with goal of returning home, patient understands need for Left JENAE in the future to ensure her ability to ambulate; She has F/U appt with Dr. Adamson group.    [address]          [facility]  [phone]    "

## 2018-08-22 ENCOUNTER — PATIENT OUTREACH (OUTPATIENT)
Dept: CARE COORDINATION | Facility: CLINIC | Age: 73
End: 2018-08-22

## 2018-08-22 ASSESSMENT — ACTIVITIES OF DAILY LIVING (ADL): DEPENDENT_IADLS:: COOKING;CLEANING;LAUNDRY;SHOPPING;MEAL PREPARATION;TRANSPORTATION

## 2018-08-22 NOTE — PROGRESS NOTES
Clinic Care Coordination Contact  Care Coordination Transition Communication    Referral Source: TCU        Transition to Facility:              Facility Name: [facility]              Contact name and phone number/fax: [phone]    Call from TONY who reports that patient is slowly improving. No discharge plans at this time.  She will inform CC as to any discharge plan.     Plan: SW Care Coordinator will await notification from facility staff informing RN/SW Care Coordinator of patient's discharge plans/needs. RN/SW Care Coordinator will review chart and outreach to facility staff every 4 weeks and as needed.     Francisca Hernandez,   [facility]  [email or web address]  [phone]

## 2018-08-24 ENCOUNTER — TELEPHONE (OUTPATIENT)
Dept: ORTHOPEDICS | Facility: CLINIC | Age: 73
End: 2018-08-24

## 2018-08-24 ENCOUNTER — NURSING HOME VISIT (OUTPATIENT)
Dept: GERIATRICS | Facility: CLINIC | Age: 73
End: 2018-08-24
Payer: COMMERCIAL

## 2018-08-24 DIAGNOSIS — Z09 FOLLOW-UP EXAMINATION FOLLOWING SURGERY: Primary | ICD-10-CM

## 2018-08-24 NOTE — TELEPHONE ENCOUNTER
There can be an expression of retained fluid later in the course that is not related to an infection, but should be evaluated.    J can evaluate.  IF need for her to be seen in clinic have her come when Dr. Garcia is present, or schedule her with Dr. Garcia.    Donal Grover PA-C  [facility] - Surgery

## 2018-08-24 NOTE — TELEPHONE ENCOUNTER
Phone call to [facility], and informed that David Grover PA-C was going to get in touch with KRISH Mcknight to come and see patient.   She will note it on the patient's chart.     DIEGO Javed RN

## 2018-08-24 NOTE — PROGRESS NOTES
Ortho Nursing home visit    Cristy Bailey is a 73 year old female who resides at [facility]    Patient is seen today for Draining Right hip wound, now 25 days S/P Right JENAE      Past Medical History:   Diagnosis Date     Arthritis     hip     HTN (hypertension) 1/2010      Leg weakness 3/24/2015     Lyme disease 1980'S AND 2004    lYME DZ LIKE SXS RESPONDED TO ABX      Past Surgical History:   Procedure Laterality Date     ARTHROPLASTY HIP Right 7/30/2018    Procedure: ARTHROPLASTY HIP;  Right total hip arthroplasty;  Surgeon: Bry Garcia MD;  Location: [facility] OR     BIOPSY OF UTERUS LINING  3/2010    negative.      C C-SEC ONLY,PREV C-SEC      c-sec x 3      COLONOSCOPY  2/21/10    benign findings. advised 10 yr f/u.         Allergies   Allergen Reactions     No Known Drug Allergies       There were no vitals taken for this visit.     Exam: Rises from seated position to standing, Incision draining moderate amount of purulent fluid soaking through 2 ABD pads, within 3 hours. Some noted redness around incision. Afebrile now, max temp last 24 hours 100.5 wound cultures taken yesterday with Gram stain, are showing Gram pos, Cocci, Gram pos Bacilli, Gram neg Bacilli, Blood cultures pending;     ASSESSMENT / PLAN:  Will forward above information, to Dr Adamson office. And they will contact [facility]. Regarding any plans moving forward.    [id number]          Carlo \[facility] [place] [facility]\""-C    [phone] Cell  [phone]

## 2018-08-24 NOTE — TELEPHONE ENCOUNTER
Cristy Bailey is a 73 year old female whose TCU nurse, Kita calls from [facility].   This am patient had a large amount of foul smelling drainage from R JENAE incision, DOS 8/2/18. It saturated through 2 ABD pads and previously had been draining a small amount.   Blood cultures were done yesterday.   NP saw incision today and there is redness surrounding the incision as well.   Patient is afebrile, temp 98.2.   They feel patient should be evaluated.   Will discuss with David Grover PA-C to see if REMY Koch can come see patient.     Patient expecting call back: YES      Preferred contact number:  [phone]  Can we leave a detailed message on this number: Not Applicable     Please advise.     DIEGO Javed RN

## 2018-08-27 ENCOUNTER — TELEPHONE (OUTPATIENT)
Dept: ORTHOPEDICS | Facility: CLINIC | Age: 73
End: 2018-08-27

## 2018-08-27 DIAGNOSIS — T84.59XD INFECTION OF PROSTHETIC TOTAL HIP JOINT, SUBSEQUENT ENCOUNTER: Primary | ICD-10-CM

## 2018-08-27 DIAGNOSIS — Z96.649 INFECTION OF PROSTHETIC TOTAL HIP JOINT, SUBSEQUENT ENCOUNTER: Primary | ICD-10-CM

## 2018-08-27 NOTE — TELEPHONE ENCOUNTER
Spoke to patient and nurseEdwina at [facility]--[phone]     Scheduled surgery.     Type of surgery: Right hip I&D  Location of surgery: [facility]  Date and time of surgery: 8/29/18 @ 1150am  Surgeon: toro   Pre-Op Appt Date: will be completed at [facility]  Post-Op Appt Date: 9/10/18   Packet sent out: Yes  Pre-cert/Authorization completed:  Not Applicable  Date: 8/27/18

## 2018-08-28 NOTE — PHARMACY-ADMISSION MEDICATION HISTORY
Medication reconciliation interview completed by pre-admitting nurse Liliya Reaves RN , reviewed by pharmacy. No further clarifications needed.       Prior to Admission medications    Medication Sig Last Dose Taking? Auth Provider   acetaminophen (TYLENOL) 325 MG tablet Take 3 tablets (975 mg) by mouth every 8 hours   Donal Groevr PA-C   aspirin 325 MG tablet Take 1 tablet (325 mg) by mouth daily   Donal Grover PA-C   bisacodyl (DULCOLAX) 10 MG Suppository Place 1 suppository (10 mg) rectally daily as needed for constipation   Froy Krishnamurthy MD   ferrous sulfate (IRON) 325 (65 Fe) MG tablet Take 1 tablet (325 mg) by mouth 2 times daily   Donal Grover PA-C   gabapentin (NEURONTIN) 300 MG capsule Take 1 capsule (300 mg) by mouth 2 times daily   Bry Garcia MD   HYDROmorphone (DILAUDID) 2 MG tablet Take 1 tablet (2 mg) by mouth every 4 hours as needed for other (pain control or improvement in physical function. Hold dose for analgesic side effects.)   Donal Grover PA-C   polyethylene glycol (MIRALAX/GLYCOLAX) Packet Take 17 g by mouth 2 times daily as needed (constipation)   Donal Grover PA-C   senna-docusate (SENOKOT-S;PERICOLACE) 8.6-50 MG per tablet Take 1 tablet by mouth 2 times daily as needed for constipation   Donal Grover PA-C

## 2018-08-29 ENCOUNTER — HOSPITAL ENCOUNTER (INPATIENT)
Facility: CLINIC | Age: 73
LOS: 4 days | Discharge: HOME HEALTH | DRG: 857 | End: 2018-09-03
Attending: ORTHOPAEDIC SURGERY | Admitting: ORTHOPAEDIC SURGERY
Payer: COMMERCIAL

## 2018-08-29 ENCOUNTER — TELEPHONE (OUTPATIENT)
Dept: ORTHOPEDICS | Facility: CLINIC | Age: 73
End: 2018-08-29

## 2018-08-29 ENCOUNTER — ANESTHESIA EVENT (OUTPATIENT)
Dept: SURGERY | Facility: CLINIC | Age: 73
DRG: 857 | End: 2018-08-29
Payer: COMMERCIAL

## 2018-08-29 ENCOUNTER — ANESTHESIA (OUTPATIENT)
Dept: SURGERY | Facility: CLINIC | Age: 73
DRG: 857 | End: 2018-08-29
Payer: COMMERCIAL

## 2018-08-29 DIAGNOSIS — Z96.641 S/P HIP REPLACEMENT, RIGHT: ICD-10-CM

## 2018-08-29 LAB
ABO + RH BLD: NORMAL
ABO + RH BLD: NORMAL
BACTERIA SPEC CULT: NORMAL
BLD GP AB SCN SERPL QL: NORMAL
BLOOD BANK CMNT PATIENT-IMP: NORMAL
HGB BLD-MCNC: 9.8 G/DL (ref 11.7–15.7)
SPECIMEN EXP DATE BLD: NORMAL
SPECIMEN SOURCE: NORMAL

## 2018-08-29 PROCEDURE — G0378 HOSPITAL OBSERVATION PER HR: HCPCS

## 2018-08-29 PROCEDURE — 25000128 H RX IP 250 OP 636: Performed by: NURSE ANESTHETIST, CERTIFIED REGISTERED

## 2018-08-29 PROCEDURE — 36000052 ZZH SURGERY LEVEL 2 EA 15 ADDTL MIN: Performed by: ORTHOPAEDIC SURGERY

## 2018-08-29 PROCEDURE — 85018 HEMOGLOBIN: CPT | Performed by: ANESTHESIOLOGY

## 2018-08-29 PROCEDURE — 25000128 H RX IP 250 OP 636: Performed by: ANESTHESIOLOGY

## 2018-08-29 PROCEDURE — 87070 CULTURE OTHR SPECIMN AEROBIC: CPT | Performed by: ORTHOPAEDIC SURGERY

## 2018-08-29 PROCEDURE — 36000050 ZZH SURGERY LEVEL 2 1ST 30 MIN: Performed by: ORTHOPAEDIC SURGERY

## 2018-08-29 PROCEDURE — 25000128 H RX IP 250 OP 636: Performed by: ORTHOPAEDIC SURGERY

## 2018-08-29 PROCEDURE — 37000009 ZZH ANESTHESIA TECHNICAL FEE, EACH ADDTL 15 MIN: Performed by: ORTHOPAEDIC SURGERY

## 2018-08-29 PROCEDURE — 40000306 ZZH STATISTIC PRE PROC ASSESS II: Performed by: ORTHOPAEDIC SURGERY

## 2018-08-29 PROCEDURE — 86850 RBC ANTIBODY SCREEN: CPT | Performed by: ANESTHESIOLOGY

## 2018-08-29 PROCEDURE — 25800025 ZZH RX 258: Performed by: ORTHOPAEDIC SURGERY

## 2018-08-29 PROCEDURE — 86900 BLOOD TYPING SEROLOGIC ABO: CPT | Performed by: ANESTHESIOLOGY

## 2018-08-29 PROCEDURE — 25000132 ZZH RX MED GY IP 250 OP 250 PS 637: Performed by: ORTHOPAEDIC SURGERY

## 2018-08-29 PROCEDURE — 25000566 ZZH SEVOFLURANE, EA 15 MIN: Performed by: ORTHOPAEDIC SURGERY

## 2018-08-29 PROCEDURE — 36415 COLL VENOUS BLD VENIPUNCTURE: CPT | Performed by: ANESTHESIOLOGY

## 2018-08-29 PROCEDURE — 71000013 ZZH RECOVERY PHASE 1 LEVEL 1 EA ADDTL HR: Performed by: ORTHOPAEDIC SURGERY

## 2018-08-29 PROCEDURE — 25000125 ZZHC RX 250: Performed by: NURSE ANESTHETIST, CERTIFIED REGISTERED

## 2018-08-29 PROCEDURE — 0JDL0ZZ EXTRACTION OF RIGHT UPPER LEG SUBCUTANEOUS TISSUE AND FASCIA, OPEN APPROACH: ICD-10-PCS | Performed by: ORTHOPAEDIC SURGERY

## 2018-08-29 PROCEDURE — 27211024 ZZHC OR SUPPLY OTHER OPNP: Performed by: ORTHOPAEDIC SURGERY

## 2018-08-29 PROCEDURE — 87186 SC STD MICRODIL/AGAR DIL: CPT | Performed by: ORTHOPAEDIC SURGERY

## 2018-08-29 PROCEDURE — 87075 CULTR BACTERIA EXCEPT BLOOD: CPT | Performed by: ORTHOPAEDIC SURGERY

## 2018-08-29 PROCEDURE — 87077 CULTURE AEROBIC IDENTIFY: CPT | Performed by: ORTHOPAEDIC SURGERY

## 2018-08-29 PROCEDURE — 27210794 ZZH OR GENERAL SUPPLY STERILE: Performed by: ORTHOPAEDIC SURGERY

## 2018-08-29 PROCEDURE — 71000012 ZZH RECOVERY PHASE 1 LEVEL 1 FIRST HR: Performed by: ORTHOPAEDIC SURGERY

## 2018-08-29 PROCEDURE — 87176 TISSUE HOMOGENIZATION CULTR: CPT | Performed by: ORTHOPAEDIC SURGERY

## 2018-08-29 PROCEDURE — 86901 BLOOD TYPING SEROLOGIC RH(D): CPT | Performed by: ANESTHESIOLOGY

## 2018-08-29 PROCEDURE — 37000008 ZZH ANESTHESIA TECHNICAL FEE, 1ST 30 MIN: Performed by: ORTHOPAEDIC SURGERY

## 2018-08-29 PROCEDURE — 26991 DRAINAGE OF PELVIS BURSA: CPT | Mod: 78 | Performed by: ORTHOPAEDIC SURGERY

## 2018-08-29 RX ORDER — BISACODYL 10 MG
10 SUPPOSITORY, RECTAL RECTAL DAILY PRN
Status: DISCONTINUED | OUTPATIENT
Start: 2018-08-29 | End: 2018-09-03 | Stop reason: HOSPADM

## 2018-08-29 RX ORDER — NALOXONE HYDROCHLORIDE 0.4 MG/ML
.1-.4 INJECTION, SOLUTION INTRAMUSCULAR; INTRAVENOUS; SUBCUTANEOUS
Status: DISCONTINUED | OUTPATIENT
Start: 2018-08-29 | End: 2018-08-29

## 2018-08-29 RX ORDER — FENTANYL CITRATE 50 UG/ML
25-50 INJECTION, SOLUTION INTRAMUSCULAR; INTRAVENOUS
Status: DISCONTINUED | OUTPATIENT
Start: 2018-08-29 | End: 2018-08-29 | Stop reason: HOSPADM

## 2018-08-29 RX ORDER — ONDANSETRON 2 MG/ML
4 INJECTION INTRAMUSCULAR; INTRAVENOUS EVERY 30 MIN PRN
Status: DISCONTINUED | OUTPATIENT
Start: 2018-08-29 | End: 2018-08-29 | Stop reason: HOSPADM

## 2018-08-29 RX ORDER — DIMENHYDRINATE 50 MG/ML
25 INJECTION, SOLUTION INTRAMUSCULAR; INTRAVENOUS
Status: DISCONTINUED | OUTPATIENT
Start: 2018-08-29 | End: 2018-08-29 | Stop reason: HOSPADM

## 2018-08-29 RX ORDER — ONDANSETRON 4 MG/1
4 TABLET, ORALLY DISINTEGRATING ORAL EVERY 30 MIN PRN
Status: DISCONTINUED | OUTPATIENT
Start: 2018-08-29 | End: 2018-08-29 | Stop reason: HOSPADM

## 2018-08-29 RX ORDER — POLYETHYLENE GLYCOL 3350 17 G/17G
17 POWDER, FOR SOLUTION ORAL 2 TIMES DAILY PRN
Status: DISCONTINUED | OUTPATIENT
Start: 2018-08-29 | End: 2018-09-03 | Stop reason: HOSPADM

## 2018-08-29 RX ORDER — HYDROMORPHONE HYDROCHLORIDE 1 MG/ML
.5-1 INJECTION, SOLUTION INTRAMUSCULAR; INTRAVENOUS; SUBCUTANEOUS
Status: DISCONTINUED | OUTPATIENT
Start: 2018-08-29 | End: 2018-09-03 | Stop reason: HOSPADM

## 2018-08-29 RX ORDER — DEXAMETHASONE SODIUM PHOSPHATE 4 MG/ML
INJECTION, SOLUTION INTRA-ARTICULAR; INTRALESIONAL; INTRAMUSCULAR; INTRAVENOUS; SOFT TISSUE PRN
Status: DISCONTINUED | OUTPATIENT
Start: 2018-08-29 | End: 2018-08-29

## 2018-08-29 RX ORDER — ASPIRIN 325 MG
325 TABLET ORAL DAILY
Status: DISCONTINUED | OUTPATIENT
Start: 2018-08-29 | End: 2018-08-29

## 2018-08-29 RX ORDER — SODIUM CHLORIDE, SODIUM LACTATE, POTASSIUM CHLORIDE, CALCIUM CHLORIDE 600; 310; 30; 20 MG/100ML; MG/100ML; MG/100ML; MG/100ML
INJECTION, SOLUTION INTRAVENOUS CONTINUOUS
Status: DISCONTINUED | OUTPATIENT
Start: 2018-08-29 | End: 2018-08-29 | Stop reason: HOSPADM

## 2018-08-29 RX ORDER — LABETALOL HYDROCHLORIDE 5 MG/ML
10 INJECTION, SOLUTION INTRAVENOUS
Status: DISCONTINUED | OUTPATIENT
Start: 2018-08-29 | End: 2018-08-29 | Stop reason: HOSPADM

## 2018-08-29 RX ORDER — FERROUS SULFATE 325(65) MG
325 TABLET ORAL 2 TIMES DAILY
Status: DISCONTINUED | OUTPATIENT
Start: 2018-08-29 | End: 2018-09-03 | Stop reason: HOSPADM

## 2018-08-29 RX ORDER — HYDRALAZINE HYDROCHLORIDE 20 MG/ML
2.5-5 INJECTION INTRAMUSCULAR; INTRAVENOUS EVERY 10 MIN PRN
Status: DISCONTINUED | OUTPATIENT
Start: 2018-08-29 | End: 2018-08-29 | Stop reason: HOSPADM

## 2018-08-29 RX ORDER — ONDANSETRON 2 MG/ML
4 INJECTION INTRAMUSCULAR; INTRAVENOUS EVERY 6 HOURS PRN
Status: DISCONTINUED | OUTPATIENT
Start: 2018-08-29 | End: 2018-09-03 | Stop reason: HOSPADM

## 2018-08-29 RX ORDER — FENTANYL CITRATE 50 UG/ML
INJECTION, SOLUTION INTRAMUSCULAR; INTRAVENOUS PRN
Status: DISCONTINUED | OUTPATIENT
Start: 2018-08-29 | End: 2018-08-29

## 2018-08-29 RX ORDER — KETAMINE HYDROCHLORIDE 10 MG/ML
INJECTION INTRAMUSCULAR; INTRAVENOUS PRN
Status: DISCONTINUED | OUTPATIENT
Start: 2018-08-29 | End: 2018-08-29

## 2018-08-29 RX ORDER — LIDOCAINE HYDROCHLORIDE 10 MG/ML
INJECTION, SOLUTION INFILTRATION; PERINEURAL PRN
Status: DISCONTINUED | OUTPATIENT
Start: 2018-08-29 | End: 2018-08-29

## 2018-08-29 RX ORDER — CEFAZOLIN SODIUM 2 G/100ML
2 INJECTION, SOLUTION INTRAVENOUS EVERY 8 HOURS
Status: COMPLETED | OUTPATIENT
Start: 2018-08-29 | End: 2018-08-30

## 2018-08-29 RX ORDER — SODIUM CHLORIDE 9 MG/ML
INJECTION, SOLUTION INTRAVENOUS CONTINUOUS
Status: DISCONTINUED | OUTPATIENT
Start: 2018-08-29 | End: 2018-09-03 | Stop reason: HOSPADM

## 2018-08-29 RX ORDER — LIDOCAINE 40 MG/G
CREAM TOPICAL
Status: DISCONTINUED | OUTPATIENT
Start: 2018-08-29 | End: 2018-08-29 | Stop reason: HOSPADM

## 2018-08-29 RX ORDER — NALOXONE HYDROCHLORIDE 0.4 MG/ML
.1-.4 INJECTION, SOLUTION INTRAMUSCULAR; INTRAVENOUS; SUBCUTANEOUS
Status: DISCONTINUED | OUTPATIENT
Start: 2018-08-29 | End: 2018-09-03 | Stop reason: HOSPADM

## 2018-08-29 RX ORDER — SULFAMETHOXAZOLE/TRIMETHOPRIM 800-160 MG
1 TABLET ORAL 2 TIMES DAILY
Status: DISCONTINUED | OUTPATIENT
Start: 2018-08-29 | End: 2018-08-31

## 2018-08-29 RX ORDER — PROPOFOL 10 MG/ML
INJECTION, EMULSION INTRAVENOUS PRN
Status: DISCONTINUED | OUTPATIENT
Start: 2018-08-29 | End: 2018-08-29

## 2018-08-29 RX ORDER — ONDANSETRON 4 MG/1
4 TABLET, ORALLY DISINTEGRATING ORAL EVERY 6 HOURS PRN
Status: DISCONTINUED | OUTPATIENT
Start: 2018-08-29 | End: 2018-09-03 | Stop reason: HOSPADM

## 2018-08-29 RX ORDER — TEMAZEPAM 7.5 MG/1
7.5 CAPSULE ORAL
Status: DISCONTINUED | OUTPATIENT
Start: 2018-08-30 | End: 2018-09-03 | Stop reason: HOSPADM

## 2018-08-29 RX ORDER — GLYCOPYRROLATE 0.2 MG/ML
INJECTION, SOLUTION INTRAMUSCULAR; INTRAVENOUS PRN
Status: DISCONTINUED | OUTPATIENT
Start: 2018-08-29 | End: 2018-08-29

## 2018-08-29 RX ORDER — CEFAZOLIN SODIUM 2 G/100ML
2 INJECTION, SOLUTION INTRAVENOUS
Status: COMPLETED | OUTPATIENT
Start: 2018-08-29 | End: 2018-08-29

## 2018-08-29 RX ORDER — FENTANYL CITRATE 50 UG/ML
50 INJECTION, SOLUTION INTRAMUSCULAR; INTRAVENOUS ONCE
Status: COMPLETED | OUTPATIENT
Start: 2018-08-29 | End: 2018-08-29

## 2018-08-29 RX ORDER — LIDOCAINE 40 MG/G
CREAM TOPICAL
Status: DISCONTINUED | OUTPATIENT
Start: 2018-08-29 | End: 2018-09-03 | Stop reason: HOSPADM

## 2018-08-29 RX ORDER — CEFAZOLIN SODIUM 1 G/3ML
1 INJECTION, POWDER, FOR SOLUTION INTRAMUSCULAR; INTRAVENOUS SEE ADMIN INSTRUCTIONS
Status: DISCONTINUED | OUTPATIENT
Start: 2018-08-29 | End: 2018-08-29 | Stop reason: HOSPADM

## 2018-08-29 RX ORDER — ONDANSETRON 2 MG/ML
INJECTION INTRAMUSCULAR; INTRAVENOUS PRN
Status: DISCONTINUED | OUTPATIENT
Start: 2018-08-29 | End: 2018-08-29

## 2018-08-29 RX ORDER — NEOSTIGMINE METHYLSULFATE 1 MG/ML
VIAL (ML) INJECTION PRN
Status: DISCONTINUED | OUTPATIENT
Start: 2018-08-29 | End: 2018-08-29

## 2018-08-29 RX ORDER — HYDROMORPHONE HYDROCHLORIDE 2 MG/1
2-4 TABLET ORAL EVERY 4 HOURS PRN
Status: DISCONTINUED | OUTPATIENT
Start: 2018-08-29 | End: 2018-09-03 | Stop reason: HOSPADM

## 2018-08-29 RX ORDER — GABAPENTIN 300 MG/1
300 CAPSULE ORAL 2 TIMES DAILY
Status: DISCONTINUED | OUTPATIENT
Start: 2018-08-29 | End: 2018-09-03 | Stop reason: HOSPADM

## 2018-08-29 RX ORDER — AMOXICILLIN 250 MG
1 CAPSULE ORAL 2 TIMES DAILY PRN
Status: DISCONTINUED | OUTPATIENT
Start: 2018-08-29 | End: 2018-09-03 | Stop reason: HOSPADM

## 2018-08-29 RX ORDER — HYDROXYZINE HYDROCHLORIDE 10 MG/1
10 TABLET, FILM COATED ORAL EVERY 6 HOURS PRN
Status: DISCONTINUED | OUTPATIENT
Start: 2018-08-29 | End: 2018-09-03 | Stop reason: HOSPADM

## 2018-08-29 RX ORDER — KETOROLAC TROMETHAMINE 15 MG/ML
15 INJECTION, SOLUTION INTRAMUSCULAR; INTRAVENOUS EVERY 6 HOURS
Status: COMPLETED | OUTPATIENT
Start: 2018-08-29 | End: 2018-08-30

## 2018-08-29 RX ADMIN — HYDROMORPHONE HYDROCHLORIDE 0.5 MG: 1 INJECTION, SOLUTION INTRAMUSCULAR; INTRAVENOUS; SUBCUTANEOUS at 13:37

## 2018-08-29 RX ADMIN — PHENYLEPHRINE HYDROCHLORIDE 100 MCG: 10 INJECTION, SOLUTION INTRAMUSCULAR; INTRAVENOUS; SUBCUTANEOUS at 12:12

## 2018-08-29 RX ADMIN — GABAPENTIN 300 MG: 300 CAPSULE ORAL at 20:14

## 2018-08-29 RX ADMIN — ASPIRIN 325 MG: 325 TABLET, DELAYED RELEASE ORAL at 18:08

## 2018-08-29 RX ADMIN — FENTANYL CITRATE 50 MCG: 50 INJECTION INTRAMUSCULAR; INTRAVENOUS at 13:24

## 2018-08-29 RX ADMIN — GLYCOPYRROLATE 0.6 MG: 0.2 INJECTION, SOLUTION INTRAMUSCULAR; INTRAVENOUS at 12:36

## 2018-08-29 RX ADMIN — SODIUM CHLORIDE, POTASSIUM CHLORIDE, SODIUM LACTATE AND CALCIUM CHLORIDE: 600; 310; 30; 20 INJECTION, SOLUTION INTRAVENOUS at 11:43

## 2018-08-29 RX ADMIN — PHENYLEPHRINE HYDROCHLORIDE 200 MCG: 10 INJECTION, SOLUTION INTRAMUSCULAR; INTRAVENOUS; SUBCUTANEOUS at 12:05

## 2018-08-29 RX ADMIN — GLYCOPYRROLATE 0.2 MG: 0.2 INJECTION, SOLUTION INTRAMUSCULAR; INTRAVENOUS at 11:51

## 2018-08-29 RX ADMIN — KETAMINE HCL-NACL SOLN PREF SY 50 MG/5ML-0.9% (10MG/ML) 50 MG: 10 SOLUTION PREFILLED SYRINGE at 12:12

## 2018-08-29 RX ADMIN — ONDANSETRON 4 MG: 2 INJECTION INTRAMUSCULAR; INTRAVENOUS at 11:51

## 2018-08-29 RX ADMIN — CEFAZOLIN SODIUM 2 G: 2 INJECTION, SOLUTION INTRAVENOUS at 11:43

## 2018-08-29 RX ADMIN — DEXAMETHASONE SODIUM PHOSPHATE 4 MG: 4 INJECTION, SOLUTION INTRA-ARTICULAR; INTRALESIONAL; INTRAMUSCULAR; INTRAVENOUS; SOFT TISSUE at 11:51

## 2018-08-29 RX ADMIN — SODIUM CHLORIDE, POTASSIUM CHLORIDE, SODIUM LACTATE AND CALCIUM CHLORIDE: 600; 310; 30; 20 INJECTION, SOLUTION INTRAVENOUS at 12:30

## 2018-08-29 RX ADMIN — Medication 3 MG: at 12:36

## 2018-08-29 RX ADMIN — SODIUM CHLORIDE: 9 INJECTION, SOLUTION INTRAVENOUS at 18:46

## 2018-08-29 RX ADMIN — CEFAZOLIN SODIUM 2 G: 2 INJECTION, SOLUTION INTRAVENOUS at 23:07

## 2018-08-29 RX ADMIN — SULFAMETHOXAZOLE AND TRIMETHOPRIM 1 TABLET: 800; 160 TABLET ORAL at 20:14

## 2018-08-29 RX ADMIN — KETOROLAC TROMETHAMINE 15 MG: 15 INJECTION, SOLUTION INTRAMUSCULAR; INTRAVENOUS at 22:54

## 2018-08-29 RX ADMIN — FENTANYL CITRATE 100 MCG: 50 INJECTION, SOLUTION INTRAMUSCULAR; INTRAVENOUS at 11:50

## 2018-08-29 RX ADMIN — MIDAZOLAM 2 MG: 1 INJECTION INTRAMUSCULAR; INTRAVENOUS at 11:43

## 2018-08-29 RX ADMIN — LIDOCAINE HYDROCHLORIDE 50 MG: 10 INJECTION, SOLUTION INFILTRATION; PERINEURAL at 11:51

## 2018-08-29 RX ADMIN — HYDROMORPHONE HYDROCHLORIDE 0.5 MG: 1 INJECTION, SOLUTION INTRAMUSCULAR; INTRAVENOUS; SUBCUTANEOUS at 13:25

## 2018-08-29 RX ADMIN — FENTANYL CITRATE 50 MCG: 50 INJECTION INTRAMUSCULAR; INTRAVENOUS at 12:59

## 2018-08-29 RX ADMIN — HYDROMORPHONE HYDROCHLORIDE 2 MG: 2 TABLET ORAL at 20:14

## 2018-08-29 RX ADMIN — KETOROLAC TROMETHAMINE 15 MG: 15 INJECTION, SOLUTION INTRAMUSCULAR; INTRAVENOUS at 18:08

## 2018-08-29 RX ADMIN — ROCURONIUM BROMIDE 35 MG: 10 INJECTION INTRAVENOUS at 11:51

## 2018-08-29 RX ADMIN — PROPOFOL 200 MG: 10 INJECTION, EMULSION INTRAVENOUS at 11:51

## 2018-08-29 RX ADMIN — Medication 325 MG: at 20:14

## 2018-08-29 RX ADMIN — FENTANYL CITRATE 50 MCG: 50 INJECTION INTRAMUSCULAR; INTRAVENOUS at 10:52

## 2018-08-29 RX ADMIN — PHENYLEPHRINE HYDROCHLORIDE 100 MCG: 10 INJECTION, SOLUTION INTRAMUSCULAR; INTRAVENOUS; SUBCUTANEOUS at 12:17

## 2018-08-29 RX ADMIN — PHENYLEPHRINE HYDROCHLORIDE 100 MCG: 10 INJECTION, SOLUTION INTRAMUSCULAR; INTRAVENOUS; SUBCUTANEOUS at 12:26

## 2018-08-29 ASSESSMENT — LIFESTYLE VARIABLES: TOBACCO_USE: 1

## 2018-08-29 ASSESSMENT — ENCOUNTER SYMPTOMS
DYSRHYTHMIAS: 0
STRIDOR: 0
SEIZURES: 0

## 2018-08-29 ASSESSMENT — COPD QUESTIONNAIRES: COPD: 0

## 2018-08-29 NOTE — ANESTHESIA CARE TRANSFER NOTE
Patient: Cristy Bailey    Procedure(s):  Incision and debridement right hip  - Wound Class: IV-Dirty or Infected    Diagnosis: Infection of JENAE status post 8.2  Diagnosis Additional Information: No value filed.    Anesthesia Type:   General, ETT     Note:  Airway :Face Mask  Patient transferred to:PACU  Handoff Report: Identifed the Patient, Identified the Reponsible Provider, Reviewed the pertinent medical history, Discussed the surgical course, Reviewed Intra-OP anesthesia mangement and issues during anesthesia, Set expectations for post-procedure period and Allowed opportunity for questions and acknowledgement of understanding      Vitals: (Last set prior to Anesthesia Care Transfer)    CRNA VITALS  8/29/2018 1220 - 8/29/2018 1257      8/29/2018             Pulse: 76    SpO2: 98 %    Resp Rate (observed): (!)  1                Electronically Signed By: ISRAEL Rodriguez CRNA  August 29, 2018  12:57 PM

## 2018-08-29 NOTE — TELEPHONE ENCOUNTER
Received call from [facility] pre-admit department on 8/28 @ 432pm. Patient hemoglobin level was 8.6. Per Pre-Admit, they are going to do a type and screen and a retest of hemoglobin level this morning.   Received the message on Wednesday morning and called and left message relaying information for Dr. Garcia on his cell phone.  Zelda Patiño ATC

## 2018-08-29 NOTE — TELEPHONE ENCOUNTER
Dr. Garcia notified, patient cleared for surgery.     Closing encounter.     Keira Santiago, ATC

## 2018-08-29 NOTE — IP AVS SNAPSHOT
MRN:[id number]                      After Visit Summary   8/29/2018    Cristy Bailey    MRN: [id number]           Thank you!     Thank you for choosing [facility] for your care. Our goal is always to provide you with excellent care. Hearing back from our patients is one way we can continue to improve our services. Please take a few minutes to complete the written survey that you may receive in the mail after you visit. If you would like to speak to someone directly about your visit please contact Patient Relations at [phone]. Thank you!          Patient Information     Date Of Birth          1945        Designated Caregiver       Most Recent Value    Caregiver    Will someone help with your care after discharge? yes    Name of designated caregiver Leo(friend)    Phone number of caregiver [phone]    Caregiver address same as pt      About your hospital stay     You were admitted on:  August 29, 2018 You last received care in the:  [facility]    You were discharged on:  September 3, 2018        Reason for your hospital stay       Wash-out of left hip wound.  Cx's grew Staph Aureus, will rx with oral Keflex QID for 2 weeks                  Who to Call     For medical emergencies, please call 911.  For non-urgent questions about your medical care, please call your primary care provider or clinic, [phone]  For questions related to your surgery, please call your surgery clinic        Attending Provider     Provider Specialty    Bry Garcia MD Orthopedics       Primary Care Provider Office Phone # Fax #    Manoj Daley PA-C [phone]      After Care Instructions     Activity - Up with nursing assistance           Discharge Instructions - diet       Resume pre procedure diet.            Fall precautions           General info for SNF       Length of Stay Estimate: Short Term Care: Estimated # of Days <30  Condition  at Discharge: Improving  Level of care:skilled   Rehabilitation Potential: Good  Admission H&P remains valid and up-to-date: Yes  Recent Chemotherapy: N/A  Use Nursing Home Standing Orders: Yes            Mantoux instructions       Give two-step Mantoux (PPD) Per Facility Policy Yes            Shower       No shower for 24 hours post procedure. May shower on post-op day  2            Wound care       Site:   L hip  Instructions:  Leave Wound vac on until Wednesday.  Then change to dry dressings daily and prn                  Your next 10 appointments already scheduled     Sep 10, 2018  1:40 PM CDT   Return Visit with Donal Grover PA-C   [facility] ([facility])    [address] [facility]  [address] [place] [address]   [phone]              Further instructions from your care team       Your home care referral was sent to [facility].  If you haven't heard from them within the next 24-48 hours,  Please call them at [phone]    Return to clinic in10-14 days. Call [phone] to schedule or if you experience any problems before your scheduled appointment.    See general discharge instruction sheet for hips.  1. Do exercises as instructed by therapist.  2. Observe your hip precautions.  3. Walk daily increasing distance and time each day by a little.  4. Ice hip for swelling and discomfort.  5. May shower, inspect dressing daily for problems listed below   6.Notify your dentist or physician of your implant so you can get antibiotics before any dental work or any other invasive procedure (ie: colonoscopy).  7. Do not cross legs.    Aquacel dressing:  DO NOT CHANGE DRESSING DAILY.  Leave this dressing on until follow-up appointment with Orthopedic Surgeon.  Dressing is waterproof, can shower with it on, pat dry when done.  No soaking such as in tub baths, pools or hot tubs    While on narcotic pain medication, to prevent constipation:  1.  Drink plenty of water to keep well hydrated   2. May take over the counter Colace or Senna (follow instructions on label)     Call your physician if: ([phone])   1. Persistent fever greater than 100 degrees with body chills or excessive sweating.  2. Increased/persistent redness, localized warmth, tenderness, drainage or swelling at incision site. Greater than 50% drainage on dressing.   3. Pain not controlled with oral pain medications, ice and rest.   4. No bowel movement in 3 days (may use Milk of Magnesia or other over the counter remedy).  5. Chest pain, shortness of breath, and/or calf pain with excessive swelling.  6. Generalized feeling of illness.  7. Any other questions or concerns related to your surgery/recovery.      Thank you for allowing [facility] to participate in your cares!!         Pending Results     Date and Time Order Name Status Description    8/29/2018 1250 Anaerobic bacterial culture Preliminary             Statement of Approval     Ordered          08/31/18 0393  I have reviewed and agree with all the recommendations and orders detailed in this document.  EFFECTIVE NOW     Approved and electronically signed by:  Bry Garcia MD             Admission Information     Date & Time Provider Department Dept. Phone    8/29/2018 Bry Garcia MD [facility] [phone]      Your Vitals Were     Blood Pressure Pulse Temperature Respirations Pulse Oximetry       117/50 76 96.4  F (35.8  C) (Oral) 18 96%       MyChart Information     MyChart gives you secure access to your electronic health record. If you see a primary care provider, you can also send messages to your care team and make appointments. If you have questions, please call your primary care clinic.  If you do not have a primary care provider, please call [phone] and they will assist you.        Care EveryWhere ID     This is your Care EveryWhere ID. This could be used by other  organizations to access your [place] medical records  [id number]        Equal Access to Services     MICHELLE WISE : Hadii braden Martin, jeyson esposito, larry ferro. So [facility] [phone].    ATENCIÓN: Si habla español, tiene a pompa disposición servicios gratuitos de asistencia lingüística. Llame al [phone].    We comply with applicable federal civil rights laws and [place] laws. We do not discriminate on the basis of race, color, national origin, age, disability, sex, sexual orientation, or gender identity.               Review of your medicines      START taking        Dose / Directions    cephALEXin 500 MG capsule   Commonly known as:  KEFLEX   Used for:  S/P hip replacement, right        Dose:  500 mg   Take 1 capsule (500 mg) by mouth 4 times daily   Quantity:  56 capsule   Refills:  0         CONTINUE these medicines which may have CHANGED, or have new prescriptions. If we are uncertain of the size of tablets/capsules you have at home, strength may be listed as something that might have changed.        Dose / Directions    HYDROmorphone 2 MG tablet   Commonly known as:  DILAUDID   This may have changed:  how much to take   Used for:  S/P hip replacement, right   Notes to Patient:  Next available at any time        Dose:  2-4 mg   Take 1-2 tablets (2-4 mg) by mouth every 4 hours as needed for other (pain control or improvement in physical function. Hold dose for analgesic side effects.)   Quantity:  60 tablet   Refills:  0       senna-docusate 8.6-50 MG per tablet   Commonly known as:  SENOKOT-S;PERICOLACE   This may have changed:  when to take this   Used for:  Constipation due to pain medication   Notes to Patient:  Tonight if needed        Dose:  1 tablet   Take 1 tablet by mouth 2 times daily as needed for constipation   Quantity:  100 tablet   Refills:  0         CONTINUE these medicines which have NOT CHANGED        Dose /  Directions    acetaminophen 325 MG tablet   Commonly known as:  TYLENOL   Used for:  S/P hip replacement, right   Notes to Patient:  Available at any time  Do NOT take more than 3,000 mg daily        Dose:  975 mg   Take 3 tablets (975 mg) by mouth every 8 hours   Quantity:  100 tablet   Refills:  0       aspirin 325 MG tablet   Used for:  Venous thromboembolism (VTE) prophylaxis prescribed at discharge        Dose:  325 mg   Take 1 tablet (325 mg) by mouth daily   Quantity:  45 tablet   Refills:  0       BACTRIM DS PO   Notes to Patient:  Stop        Dose:  1 tablet   Take 1 tablet by mouth 2 times daily   Refills:  0       bisacodyl 10 MG Suppository   Commonly known as:  DULCOLAX   Used for:  Constipation due to pain medication        Dose:  10 mg   Place 1 suppository (10 mg) rectally daily as needed for constipation   Quantity:  25 suppository   Refills:  1       ferrous sulfate 325 (65 Fe) MG tablet   Commonly known as:  IRON   Used for:  Other iron deficiency anemia        Dose:  325 mg   Take 1 tablet (325 mg) by mouth 2 times daily   Quantity:  100 tablet   Refills:  0       gabapentin 300 MG capsule   Commonly known as:  NEURONTIN   Used for:  Weakness of both lower extremities, Bilateral leg pain        Dose:  300 mg   Take 1 capsule (300 mg) by mouth 2 times daily   Quantity:  180 capsule   Refills:  0       polyethylene glycol Packet   Commonly known as:  MIRALAX/GLYCOLAX   Used for:  Constipation due to pain medication   Notes to Patient:  Tonight if needed        Dose:  17 g   Take 17 g by mouth 2 times daily as needed (constipation)   Quantity:  7 packet   Refills:  0            Where to get your medicines      Some of these will need a paper prescription and others can be bought over the counter. Ask your nurse if you have questions.     Bring a paper prescription for each of these medications     cephALEXin 500 MG capsule    HYDROmorphone 2 MG tablet                Protect others around you: Learn  how to safely use, store and throw away your medicines at www.disposemymeds.org.        ANTIBIOTIC INSTRUCTION     You've Been Prescribed an Antibiotic - Now What?  Your healthcare team thinks that you or your loved one might have an infection. Some infections can be treated with antibiotics, which are powerful, life-saving drugs. Like all medications, antibiotics have side effects and should only be used when necessary. There are some important things you should know about your antibiotic treatment.      Your healthcare team may run tests before you start taking an antibiotic.    Your team may take samples (e.g., from your blood, urine or other areas) to run tests to look for bacteria. These test can be important to determine if you need an antibiotic at all and, if you do, which antibiotic will work best.      Within a few days, your healthcare team might change or even stop your antibiotic.    Your team may start you on an antibiotic while they are working to find out what is making you sick.    Your team might change your antibiotic because test results show that a different antibiotic would be better to treat your infection.    In some cases, once your team has more information, they learn that you do not need an antibiotic at all. They may find out that you don't have an infection, or that the antibiotic you're taking won't work against your infection. For example, an infection caused by a virus can't be treated with antibiotics. Staying on an antibiotic when you don't need it is more likely to be harmful than helpful.      You may experience side effects from your antibiotic.    Like all medications, antibiotics have side effects. Some of these can be serious.    Let you healthcare team know if you have any known allergies when you are admitted to the hospital.    One significant side effect of nearly all antibiotics is the risk of severe and sometimes deadly diarrhea caused by Clostridium difficile (C.  Difficile). This occurs when a person takes antibiotics because some good germs are destroyed. Antibiotic use allows C. diificile to take over, putting patients at high risk for this serious infection.    As a patient or caregiver, it is important to understand your or your loved one's antibiotic treatment. It is especially important for caregivers to speak up when patients can't speak for themselves. Here are some important questions to ask your healthcare team.    What infection is this antibiotic treating and how do you know I have that infection?    What side effects might occur from this antibiotic?    How long will I need to take this antibiotic?    Is it safe to take this antibiotic with other medications or supplements (e.g., vitamins) that I am taking?     Are there any special directions I need to know about taking this antibiotic? For example, should I take it with food?    How will I be monitored to know whether my infection is responding to the antibiotic?    What tests may help to make sure the right antibiotic is prescribed for me?      Information provided by:  www.cdc.gov/getsmart  U.S. Department of Health and Human Services  Centers for disease Control and Prevention  National Center for Emerging and Zoonotic Infectious Diseases  Division of Healthcare Quality Promotion        Information about OPIOIDS     PRESCRIPTION OPIOIDS: WHAT YOU NEED TO KNOW   We gave you an opioid (narcotic) pain medicine. It is important to manage your pain, but opioids are not always the best choice. You should first try all the other options your care team gave you. Take this medicine for as short a time (and as few doses) as possible.    Some activities can increase your pain, such as bandage changes or therapy sessions. It may help to take your pain medicine 30 to 60 minutes before these activities. Reduce your stress by getting enough sleep, working on hobbies you enjoy and practicing relaxation or meditation. Talk  to your care team about ways to manage your pain beyond prescription opioids.    These medicines have risks:    DO NOT drive when on new or higher doses of pain medicine. These medicines can affect your alertness and reaction times, and you could be arrested for driving under the influence (DUI). If you need to use opioids long-term, talk to your care team about driving.    DO NOT operate heavy machinery    DO NOT do any other dangerous activities while taking these medicines.    DO NOT drink any alcohol while taking these medicines.     If the opioid prescribed includes acetaminophen, DO NOT take with any other medicines that contain acetaminophen. Read all labels carefully. Look for the word  acetaminophen  or  Tylenol.  Ask your pharmacist if you have questions or are unsure.    You can get addicted to pain medicines, especially if you have a history of addiction (chemical, alcohol or substance dependence). Talk to your care team about ways to reduce this risk.    All opioids tend to cause constipation. Drink plenty of water and eat foods that have a lot of fiber, such as fruits, vegetables, prune juice, apple juice and high-fiber cereal. Take a laxative (Miralax, milk of magnesia, Colace, Senna) if you don t move your bowels at least every other day. Other side effects include upset stomach, sleepiness, dizziness, throwing up, tolerance (needing more of the medicine to have the same effect), physical dependence and slowed breathing.    Store your pills in a secure place, locked if possible. We will not replace any lost or stolen medicine. If you don t finish your medicine, please throw away (dispose) as directed by your pharmacist. The [place] Pollution Control Agency has more information about safe disposal: https://www.pca.[facility].mn.us/living-green/managing-unwanted-medications             Medication List: This is a list of all your medications and when to take them. Check marks below indicate your daily home  schedule. Keep this list as a reference.      Medications           Morning Afternoon Evening Bedtime As Needed    acetaminophen 325 MG tablet   Commonly known as:  TYLENOL   Take 3 tablets (975 mg) by mouth every 8 hours   Notes to Patient:  Available at any time  Do NOT take more than 3,000 mg daily                                aspirin 325 MG tablet   Take 1 tablet (325 mg) by mouth daily   Last time this was given:  Monday morning   Next Dose Due:  Tuesday                                    BACTRIM DS PO   Take 1 tablet by mouth 2 times daily   Last time this was given:  1 tablet on 8/31/2018  8:35 AM   Notes to Patient:  Stop                                bisacodyl 10 MG Suppository   Commonly known as:  DULCOLAX   Place 1 suppository (10 mg) rectally daily as needed for constipation                                   cephALEXin 500 MG capsule   Commonly known as:  KEFLEX   Take 1 capsule (500 mg) by mouth 4 times daily   Next Dose Due:  8 AM, 12 PM, 4 PM, 8 PM  Take dose when you get home and then dose later tonight before bed                                            ferrous sulfate 325 (65 Fe) MG tablet   Commonly known as:  IRON   Take 1 tablet (325 mg) by mouth 2 times daily   Last time this was given:  325 mg on 9/3/2018  8:32 AM   Next Dose Due:  Tonight                                       gabapentin 300 MG capsule   Commonly known as:  NEURONTIN   Take 1 capsule (300 mg) by mouth 2 times daily   Last time this was given:  300 mg on 9/3/2018  8:32 AM   Next Dose Due:  Tonight                                       HYDROmorphone 2 MG tablet   Commonly known as:  DILAUDID   Take 1-2 tablets (2-4 mg) by mouth every 4 hours as needed for other (pain control or improvement in physical function. Hold dose for analgesic side effects.)   Last time this was given:  4 mg on 9/3/2018 11:09 AM   Notes to Patient:  Next available at any time                                   polyethylene glycol Packet   Commonly  known as:  MIRALAX/GLYCOLAX   Take 17 g by mouth 2 times daily as needed (constipation)   Last time this was given:  17 g on 9/2/2018 10:59 AM   Notes to Patient:  Tonight if needed                                   senna-docusate 8.6-50 MG per tablet   Commonly known as:  SENOKOT-S;PERICOLACE   Take 1 tablet by mouth 2 times daily as needed for constipation   Last time this was given:  1 tablet on 9/2/2018 10:59 AM   Notes to Patient:  Tonight if needed                                             More Information        Treating Constipation    Constipation is a common and often uncomfortable problem. Constipation means you have bowel movements fewer than 3 times per week, or strain to pass hard, dry stool. It can last a short time. Or it can be a problem that never seems to go away. The good news is that it can often be treated and controlled.  Eat more fiber  One of the best ways to help treat constipation is to increase your fiber intake. You can do this either through diet or by using fiber supplements. Fiber (in whole grains, fruits, and vegetables) adds bulk and absorbs water to soften the stool. This helps the stool pass through the colon more easily. When you increase your fiber intake, do it slowly to avoid side effects such as bloating. Also increase the amount of water that you drink. Eating more of the following foods can add fiber to your diet.    High-fiber cereals    Whole grains, bran, and brown rice    Vegetables such as carrots, broccoli, and greens    Fresh fruits (especially apples, pears, and dried fruits like raisins and apricots)    Nuts and legumes (especially beans such as lentils, kidney beans, and lima beans)  Get physically active  Exercise helps improve the working of your colon which helps ease constipation. Try to get some physical activity every day. If you haven t been active for a while, talk to your healthcare provider before starting again.  Laxatives  Your healthcare provider  may suggest an over-the-counter product to help ease your constipation. He or she may suggest the use of bulk-forming agents or laxatives. The use of laxatives, if used as directed, is common and safe. Follow directions carefully when using them. See your healthcare provider for new-onset constipation, or long-term constipation, to rule out other causes such as medicines or thyroid disease.  Date Last Reviewed: 7/1/2016 2000-2017 [employer]. [address] [place] [address], [place] [address]. All rights reserved. This information is not intended as a substitute for professional medical care. Always follow your healthcare professional's instructions.                Using an Incentive Spirometer    An incentive spirometer is a device that helps you do deep breathing exercises. These exercises expand your lungs, aid in circulation, and help prevent pneumonia. Deep breathing exercises also help you breathe better and improve the function of your lungs by:    Keeping your lungs clear    Strengthening your breathing muscles    Helping prevent respiratory complications or problems  The incentive spirometer gives you a way to take an active part in your recovery. A nurse or therapist will teach you breathing exercises. To do these exercises, you will breathe in through your mouth and not your nose. The incentive spirometer only works correctly if you breathe in through your mouth.  Steps to clear lungs  Step 1. Exhale normally. Then, inhale normally.    Relax and breathe out.  Step 2. Place your lips tightly around the mouthpiece.    Make sure the device is upright and not tilted.  Step 3. Inhale as much air as you can through the mouthpiece (don't breathe through your nose).    Inhale slowly and deeply.    Hold your breath long enough to keep the balls or disk raised for at least 3 to 5 seconds, or as instructed by your healthcare provider.    Some spirometers have an indicator to let you know that you are  breathing in too fast. If the indicator goes off, breathe in more slowly.  Step 4. Repeat the exercise regularly.    Do this exercise every hour while you're awake, or as instructed by your healthcare provider.    If you were taught deep breathing and coughing exercises, do them regularly as instructed by your healthcare provider.   Follow-up care  Make a follow up appointment, or as directed. Also, follow up with your healthcare provider as advised or if your symptoms don't improve or continue to get worse.  When to call your healthcare provider  Call your healthcare provider right away if you have any of the following:    Fever 100.4  (38 C) or higher, or as directed by your healthcare provider    Brownish, bloody, or smelly sputum (phlegm that you cough up)  Call 911  Call 911 if any of these occur:     Shortness of breath that doesn't get better after taking your medicine    Cool, moist, pale or blue skin    Trouble breathing or swallowing, wheezing    Fainting or loss of consciousness    Feeling of dizziness or weakness, or a sudden drop in blood pressure    Feeling very ill    Lightheadedness    Chest pain or rapid heart rate  Date Last Reviewed: 1/1/2017 2000-2017 [employer]. [address] [place] [address]. All rights reserved. This information is not intended as a substitute for professional medical care. Always follow your healthcare professional's instructions.                Vacuum-Assisted Closure of a Wound  Vacuum-assisted closure (VAC) of a wound is a type of treatment to help wounds heal. It s also known as negative pressure wound therapy. During the treatment, a device lowers air pressure on the wound. This can help the wound heal more quickly.  Understanding the wound VAC system  A wound VAC system has several parts. A foam or gauze dressing is put directly on the wound. The dressing is changed every 24 to 72 hours. An adhesive film covers and seals the dressing and  wound. A drainage tube leads from under the adhesive film and connects to a portable vacuum pump. This pump removes air pressure over the wound. It may do this constantly. Or it may do it in cycles. During the treatment, you ll need to carry the portable pump everywhere you go.  Why wound VAC is used  You might need this therapy for a recent traumatic wound. Or you may need it for a chronic wound. This is a wound that does not heal the way it should over time. This can happen with wounds in people who have diabetes. You may need a wound VAC if you ve had a recent skin graft. And you may need a wound VAC for a large wound. Large wounds can take a longer time to heal.  A wound vacuum system may help your wound heal more quickly by:    Draining extra fluid from the wound    Reducing swelling    Reducing bacteria in the wound    Keeping your wound moist and warm    Helping draw together wound edges    Increasing blood flow to your wound    Decreasing inflammation  Wound VAC offers some other advantages over other types of wound care. It may decrease your overall discomfort. The dressings usually need to be changed less often. And they may be easier to keep in place.  Risks of wound VAC  Wound VAC has some rare risks, such as:    Bleeding (which may be severe)    Wound infection    An abnormal connection between the intestinal tract and the skin (enteric fistula)  Proper training in dressing changes can help reduce the risk for these complications. Also, your healthcare provider will carefully evaluate you to make sure you are a good candidate for the therapy. Certain problems can increase your risk for complications. These include:    Exposed organs or blood vessels    High risk of bleeding from another medical problem    Wound infection    Nearby bone infection    Dead wound tissue    Cancer tissue    Fragile skin, such as from aging or longtime use of topical steroids    Allergy to adhesive    Very poor blood flow to  your wound    Wounds close to joints that may reopen because of movement  Your healthcare provider will discuss the risks that apply to you. Make sure to talk with him or her about all of your questions and concerns.  Getting ready for wound VAC  You likely won t need to do much to get ready for wound VAC. In some cases, you may need to wait a while before having this therapy. For example, your healthcare provider may first need to treat an infection in your wound. Dead or damaged tissue may also need to be removed from your wound.  You or a caregiver may need training on how to use the wound VAC device. This is done if you will be able to have your wound vacuum therapy at home. In other cases, you may need to have your wound vacuum therapy in a healthcare facility.  On the day of your procedure  A healthcare provider will cover your wound with foam or gauze wound dressing. An adhesive film will be put over the dressing and wound. This seals the wound. The foam connects to a drainage tube, which leads to a vacuum pump. This pump is portable. When the pump is turned on, it draws fluid through the foam and out the drainage tubing. The pump may run constantly, or it may cycle off and on. Your exact setup will depend on the specific type of wound vacuum system that you use.  Managing your wound  You may need the dressing changed about once a day. You may need it changed more or less often, depending on your wound. You or your caregiver may be trained to do this at home. Or it may be done by a visiting healthcare provider. Your provider may prescribe a pain medicine. This is to prevent or reduce pain during the dressing change.  You will likely need to use the wound VAC system for several weeks or months. During this time, you ll carry the portable pump everywhere you go.  Nutrition for wound healing  During this time, make sure you follow a healthy diet. This is needed so the wound can heal and to prevent infection.  Your healthcare provider can tell you more about what to include in your diet during this time.  Follow up with your healthcare provider if you have a medical condition that led to your wound, such as diabetes. Your provider can help you prevent future wounds.  Follow-up care  Your healthcare provider will carefully keep track of your healing. Make sure to keep all follow-up appointments.  When to call your healthcare provider  Call your healthcare provider right away if you have any of these:    Fever of 100.4 F (38.0 C) or higher    Increased redness, swelling, or warmth around wound    Increased pain    Bright red blood or blood clots in tubing or the collection chamber of the vacuum   Date Last Reviewed: 2/1/2017 2000-2017 [employer]. [address] [place] [address], [place] [address]. All rights reserved. This information is not intended as a substitute for professional medical care. Always follow your healthcare professional's instructions.

## 2018-08-29 NOTE — BRIEF OP NOTE
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   Post-operative diagnosis Same   Procedure: Procedure(s):  Incision and debridement right hip  - Wound Class: IV-Dirty or Infected   Surgeon: Bry Garcia MD   Assistants(s): Surgeon(s) and Role:     * Bry Garcia MD - Primary   Anesthesia: General    Estimated blood loss: 10 mL    Total IV fluids: See anesthesia record   Blood transfusion: None       Drains: None   Specimens:   ID Type Source Tests Collected by Time Destination   1 : right hip wound Wound Hip, Right ANAEROBIC BACTERIAL CULTURE, WOUND CULTURE AEROBIC BACTERIAL Bry Garcia MD 8/29/2018 12:44 PM       Implants: None   Findings: Dictated   Complications: None   Condition: Stable   Comments: See Dictated Operative report for full details

## 2018-08-29 NOTE — IP AVS SNAPSHOT
[facility]    [address] [facility] [address]    Phone:  [phone]    Fax:  [phone]                                       After Visit Summary   8/29/2018    Cristy Bailey    MRN: [id number]           After Visit Summary Signature Page     I have received my discharge instructions, and my questions have been answered. I have discussed any challenges I see with this plan with the nurse or doctor.    ..........................................................................................................................................  Patient/Patient Representative Signature      ..........................................................................................................................................  Patient Representative Print Name and Relationship to Patient    ..................................................               ................................................  Date                                            Time    ..........................................................................................................................................  Reviewed by Signature/Title    ...................................................              ..............................................  Date                                                            Time          22EPIC Rev 08/18

## 2018-08-29 NOTE — ANESTHESIA POSTPROCEDURE EVALUATION
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  Diagnosis Additional Information: Infection of JENAE status post 8.2 JENAE    Anesthesia Type:  General, ETT    Note:  Anesthesia Post Evaluation    Patient location during evaluation: PACU  Patient participation: Able to fully participate in evaluation  Level of consciousness: sleepy but conscious  Pain management: adequate  Airway patency: patent  Cardiovascular status: acceptable  Respiratory status: acceptable  Hydration status: acceptable  PONV: controlled     Anesthetic complications: None          Last vitals:  Vitals:    08/29/18 1255 08/29/18 1300 08/29/18 1305   BP: 114/64  118/62   Resp: 18 24 10   Temp:      SpO2: (!) 86% 100% 100%         Electronically Signed By: Modesto Dumont MD  August 29, 2018  1:11 PM

## 2018-08-29 NOTE — ANESTHESIA PREPROCEDURE EVALUATION
Anesthesia Evaluation     . Pt has had prior anesthetic. Type: General    No history of anesthetic complications          ROS/MED HX    ENT/Pulmonary:     (+)tobacco use, Past use , . .   (-) asthma, COPD and recent URI   Neurologic:     (+)other neuro general weakness   (-) seizures and CVA   Cardiovascular:     (+) Dyslipidemia, hypertension----. : . . . :. . Previous cardiac testing date:results:date: results:ECG reviewed date:7/18 results:NSR date: results:         (-) CAD, arrhythmias and valvular problems/murmurs   METS/Exercise Tolerance:     Hematologic: Comments: Lab Test        08/02/18 08/01/18 08/01/18      --          07/30/18      --          04/30/18 02/08/18 05/03/12                       0633          1612          0737           --           1353           --           1246          1907          0937          WBC           --           --           --           --           --           --          12.4*        6.9          6.5           HGB          8.8*         9.1*         8.9*           < >         --            < >        14.3         14.0         13.9          MCV           --           --           --           --           --           --          90           90           88            PLT          188           --           --           --          153           --          283          280          272            < > = values in this interval not displayed.                  Lab Test        08/01/18 07/30/18 07/28/18 04/30/18                       0737          1353          1028          1246          NA           139           --          143          142           POTASSIUM    3.8           --          4.0          3.8           CHLORIDE     106           --          106          108           CO2          27            --          29           24            BUN          24            --          17           27            CR           0.69         0.60          0.60         0.58          ANIONGAP     6             --          8            10            ASHIA          7.5*          --          8.8          9.0           GLC          111*          --          101*         113*       8/28 Hgb 8.6         (+) Anemia, -      Musculoskeletal:   (+) arthritis, , , other musculoskeletal- Hx lyme disease and chronic weakness      GI/Hepatic:  - neg GI/hepatic ROS      (-) GERD   Renal/Genitourinary:  - ROS Renal section negative       Endo:     (+) Obesity, .   (-) Type I DM, Type II DM, thyroid disease and chronic steroid usage   Psychiatric:  - neg psychiatric ROS       Infectious Disease:  - neg infectious disease ROS       Malignancy:      - no malignancy   Other:    (+) H/O Chronic Pain,                   Physical Exam  Normal systems: cardiovascular, pulmonary and dental    Airway   Mallampati: II  TM distance: >3 FB  Neck ROM: full    Dental     Cardiovascular   Rhythm and rate: regular and normal  (-) no friction rub, no systolic click and no murmur    Pulmonary    breath sounds clear to auscultation(-) no rhonchi, no decreased breath sounds, no wheezes, no rales and no stridor                    Anesthesia Plan      History & Physical Review  History and physical reviewed and following examination; no interval change.    ASA Status:  3 .    NPO Status:  > 8 hours    Plan for General and ETT with Intravenous induction. Maintenance will be Balanced.    PONV prophylaxis:  Ondansetron (or other 5HT-3) and Dexamethasone or Solumedrol       Postoperative Care  Postoperative pain management:  IV analgesics.      Consents  Anesthetic plan, risks, benefits and alternatives discussed with:  Patient or representative and Patient..                          .

## 2018-08-29 NOTE — PLAN OF CARE
Problem: Patient Care Overview  Goal: Plan of Care/Patient Progress Review  Outcome: No Change  PRIMARY DIAGNOSIS: I&D right hip  OUTPATIENT/OBSERVATION GOALS TO BE MET BEFORE DISCHARGE:  1. Stable vital signs Yes  2. Tolerating diet:Yes  3. Pain controlled with oral pain medications:  No  4. Positive bowel sounds:  Yes  5. Voiding without difficulty:  yes  6. Able to ambulate:  No  7. Provider specific discharge goals met:  No    Patient using purewick. Very anxious at times when she thinks something is going to hurt or it's something she does not want to do. Toradol given for pain.    Discharge Planner Nurse   Safe discharge environment identified: Yes  Barriers to discharge: No       Entered by: Laila Francis 08/29/2018 6:29 PM     Please review provider order for any additional goals.   Nurse to notify provider when observation goals have been met and patient is ready for discharge.

## 2018-08-30 ENCOUNTER — APPOINTMENT (OUTPATIENT)
Dept: PHYSICAL THERAPY | Facility: CLINIC | Age: 73
DRG: 857 | End: 2018-08-30
Attending: PHYSICIAN ASSISTANT
Payer: COMMERCIAL

## 2018-08-30 LAB
ERYTHROCYTE [DISTWIDTH] IN BLOOD BY AUTOMATED COUNT: 15.6 % (ref 10–15)
GLUCOSE SERPL-MCNC: 107 MG/DL (ref 70–99)
HCT VFR BLD AUTO: 32.2 % (ref 35–47)
HGB BLD-MCNC: 9.6 G/DL (ref 11.7–15.7)
MCH RBC QN AUTO: 29 PG (ref 26.5–33)
MCHC RBC AUTO-ENTMCNC: 29.8 G/DL (ref 31.5–36.5)
MCV RBC AUTO: 97 FL (ref 78–100)
PLATELET # BLD AUTO: 425 10E9/L (ref 150–450)
RBC # BLD AUTO: 3.31 10E12/L (ref 3.8–5.2)
WBC # BLD AUTO: 8.9 10E9/L (ref 4–11)

## 2018-08-30 PROCEDURE — 36415 COLL VENOUS BLD VENIPUNCTURE: CPT | Performed by: ORTHOPAEDIC SURGERY

## 2018-08-30 PROCEDURE — 25000132 ZZH RX MED GY IP 250 OP 250 PS 637: Performed by: ORTHOPAEDIC SURGERY

## 2018-08-30 PROCEDURE — 85027 COMPLETE CBC AUTOMATED: CPT | Performed by: ORTHOPAEDIC SURGERY

## 2018-08-30 PROCEDURE — 82947 ASSAY GLUCOSE BLOOD QUANT: CPT | Performed by: ORTHOPAEDIC SURGERY

## 2018-08-30 PROCEDURE — 97110 THERAPEUTIC EXERCISES: CPT | Mod: GP | Performed by: PHYSICAL THERAPIST

## 2018-08-30 PROCEDURE — 12000000 ZZH R&B MED SURG/OB

## 2018-08-30 PROCEDURE — 97530 THERAPEUTIC ACTIVITIES: CPT | Mod: GP | Performed by: PHYSICAL THERAPIST

## 2018-08-30 PROCEDURE — 40000193 ZZH STATISTIC PT WARD VISIT: Performed by: PHYSICAL THERAPIST

## 2018-08-30 PROCEDURE — 97161 PT EVAL LOW COMPLEX 20 MIN: CPT | Mod: GP | Performed by: PHYSICAL THERAPIST

## 2018-08-30 PROCEDURE — G0378 HOSPITAL OBSERVATION PER HR: HCPCS

## 2018-08-30 PROCEDURE — 25000128 H RX IP 250 OP 636: Performed by: ORTHOPAEDIC SURGERY

## 2018-08-30 RX ORDER — CEFAZOLIN SODIUM 2 G/100ML
2 INJECTION, SOLUTION INTRAVENOUS EVERY 8 HOURS
Status: COMPLETED | OUTPATIENT
Start: 2018-08-30 | End: 2018-08-31

## 2018-08-30 RX ADMIN — KETOROLAC TROMETHAMINE 15 MG: 15 INJECTION, SOLUTION INTRAMUSCULAR; INTRAVENOUS at 05:44

## 2018-08-30 RX ADMIN — GABAPENTIN 300 MG: 300 CAPSULE ORAL at 09:20

## 2018-08-30 RX ADMIN — GABAPENTIN 300 MG: 300 CAPSULE ORAL at 21:15

## 2018-08-30 RX ADMIN — CEFAZOLIN SODIUM 2 G: 2 INJECTION, SOLUTION INTRAVENOUS at 21:23

## 2018-08-30 RX ADMIN — Medication 325 MG: at 21:15

## 2018-08-30 RX ADMIN — KETOROLAC TROMETHAMINE 15 MG: 15 INJECTION, SOLUTION INTRAMUSCULAR; INTRAVENOUS at 11:30

## 2018-08-30 RX ADMIN — HYDROMORPHONE HYDROCHLORIDE 4 MG: 2 TABLET ORAL at 21:16

## 2018-08-30 RX ADMIN — SENNOSIDES AND DOCUSATE SODIUM 1 TABLET: 8.6; 5 TABLET ORAL at 21:23

## 2018-08-30 RX ADMIN — SULFAMETHOXAZOLE AND TRIMETHOPRIM 1 TABLET: 800; 160 TABLET ORAL at 21:15

## 2018-08-30 RX ADMIN — SULFAMETHOXAZOLE AND TRIMETHOPRIM 1 TABLET: 800; 160 TABLET ORAL at 09:19

## 2018-08-30 RX ADMIN — HYDROMORPHONE HYDROCHLORIDE 4 MG: 2 TABLET ORAL at 09:23

## 2018-08-30 RX ADMIN — ASPIRIN 325 MG: 325 TABLET, DELAYED RELEASE ORAL at 09:20

## 2018-08-30 RX ADMIN — Medication 325 MG: at 09:19

## 2018-08-30 RX ADMIN — CEFAZOLIN SODIUM 2 G: 2 INJECTION, SOLUTION INTRAVENOUS at 14:49

## 2018-08-30 RX ADMIN — CEFAZOLIN SODIUM 2 G: 2 INJECTION, SOLUTION INTRAVENOUS at 06:31

## 2018-08-30 ASSESSMENT — ACTIVITIES OF DAILY LIVING (ADL)
ADLS_ACUITY_SCORE: 21
ADLS_ACUITY_SCORE: 21

## 2018-08-30 NOTE — PLAN OF CARE
Problem: Patient Care Overview  Goal: Plan of Care/Patient Progress Review  PRIMARY DIAGNOSIS: R hip Revision  OUTPATIENT/OBSERVATION GOALS TO BE MET BEFORE DISCHARGE:  1. Stable vital signs Yes  2. Tolerating diet: Yes- full liq diet  3. Pain controlled with oral pain medications:  Yes  4. Positive bowel sounds:  Yes  5. Voiding without difficulty:  Yes  6. Able to ambulate:  No  7. Provider specific discharge goals met:  No    Discharge Planner Nurse   Safe discharge environment identified: No  Barriers to discharge: Yes       Entered by: Sandra Ramos 08/30/2018 1:40 AM     Please review provider order for any additional goals.   Nurse to notify provider when observation goals have been met and patient is ready for discharge.

## 2018-08-30 NOTE — PLAN OF CARE
Problem: Patient Care Overview  Goal: Plan of Care/Patient Progress Review  Outcome: No Change  PRIMARY DIAGNOSIS: I&D of right hip  OUTPATIENT/OBSERVATION GOALS TO BE MET BEFORE DISCHARGE:  1. Stable vital signs Yes  2. Tolerating diet:Yes, diet advanced to full liquid.   3. Pain controlled with oral pain medications:  Yes  4. Positive bowel sounds:  Yes  5. Voiding without difficulty:  Yes  6. Able to ambulate:  patient has not been out of bed yet, needs to dangle feet at bedside.  7. Provider specific discharge goals met:  No    Patient very anxious when expecting discomfort. Received IV ketorolac, felt hand burn, place in infusing  IV.     Discharge Planner Nurse   Safe discharge environment identified: No  Barriers to discharge: No       Entered by: Jackie Mejia 08/29/2018 9:50 PM     Please review provider order for any additional goals.   Nurse to notify provider when observation goals have been met and patient is ready for discharge.

## 2018-08-30 NOTE — OP NOTE
Procedure Date: 08/29/2018      SURGEON:  Crystal Savage MD      ASSISTANT:  None.      ANESTHESIA:  General.      PREOPERATIVE DIAGNOSIS:  Postoperative infection, right total hip arthroplasty.      PROCEDURE PERFORMED:  Incision and debridement of right total hip arthroplasty.      INDICATIONS FOR PROCEDURE:  Cristy is a 73-year-old woman who approximately 3-1/2 weeks prior to this surgical procedure had undergone a right total hip arthroplasty.  She had some persistent drainage since the procedure and in discussions between myself and Cristy, she elected to undergo the above-stated procedure, fully understanding the potential risks as well as benefits.      OPERATIVE NOTE:  Cristy was brought to the operating room and placed in a supine position on the operating table, where general anesthesia was administered without difficulty.  She was then placed in a left lateral decubitus position, carefully padding all dependent appendages.  Her right buttock and lower extremity were scrubbed and prepped in the usual sterile fashion and draped sterilely.        Examination of the wound reveals relatively little cellulitis.  There was no fluctuance.  There was no purulence.  It was serous drainage with small flecks of necrotic-appearing fat.  There were 2 areas of drainage approximately 4 inches apart in the middle section of the wound.  A skin incision was made between the 2 drainage portals.  There was no abscess pocket, but there were areas of fatty appearing necrotic material.  Duplicate cultures were obtained.  These did not track to the joint itself.  Three liters of triple antibiotic was then pulse lavaged into the wound and the wound was closed in a routine fashion.  It was dressed with a VAC dressing.  Cristy tolerated the procedure well and left the operating room in satisfactory and stable condition.      ESTIMATED BLOOD LOSS:  10 mL.      SPONGE AND NEEDLE COUNT:  Correct x 2.         CRYSTAL SAVAGE MD              D: 2018   T: 2018   MT: NANCY      Name:     LUCIANO JONES   MRN:      [id number]        Account:        [id number]   :      1945           Procedure Date: 2018      Document: [id number]

## 2018-08-30 NOTE — PLAN OF CARE
Problem: Patient Care Overview  Goal: Plan of Care/Patient Progress Review  Outcome: Improving  Alert and oriented, very anxious at times. Pt very tearful and hyperventilating many times this shift in anticipation of activities. Tolerating regular diet. Stood at bedside and took a couple steps in room with Ax2, gait belt, and walker. Dressing has small amount drainage, baseline numbness/edema in BLE. Incisional negative pressure wound therapy in place on dressing. Purewick putting out adequate amounts. Pain managed with Toradol and prn PO Dilaudid x1. Wound culture showing moderate growth, ID consulted, IV Ancef given. Possible dc to [facility]'s TCU pending wound culture results.

## 2018-08-30 NOTE — PLAN OF CARE
Problem: Patient Care Overview  Goal: Plan of Care/Patient Progress Review  PT: PT eval completed. Pt is status post I & D of R hip. Pt WBAT, not in chart but verbal. Pt here from [facility]. Pt lives with friend who was helping PRN with ADLs and transfers prior to surgery. Pt lives in ramp accessible home.   Discharge Planner PT   Patient plan for discharge: home  Current status:Pt limited session due to anxiety and needing to use BR. Pt did not feel she could walk into BR. Pt was cued for ambulation to W/C 5 feet away and then needing assist due to small space to navigate into BR. Pt CGA for transfers, needing momentum but adamant about not receiving help.   Barriers to return to prior living situation: limited mobility at this time  Recommendations for discharge: Pt would benefit from further PT at TCU, but adamant about discharge home, recommend home PT assist from friend PRN. OT consult in hospital.  Rationale for recommendations: Pt able to transfer out of bed on/off toilet with CGA only, limited ambulation at this point, but anxiety preventing mobility, likely able to progress with further therapy tomorrow.        Entered by: Jeana Kuhn 08/30/2018 4:33 PM

## 2018-08-30 NOTE — PROGRESS NOTES
08/30/18 1520   Quick Adds   Type of Visit Initial PT Evaluation   Living Environment   Lives With friend(s)  (Leo)   Living Arrangements house;mobile home   Home Accessibility ramps present at home   Self-Care   Regular Exercise yes   Activity/Exercise Type walking;strength training   Exercise Amount/Frequency daily   Equipment Currently Used at Home walker, standard;wheelchair, manual;other (see comments)   Activity/Exercise/Self-Care Comment Pt sleeps in a recliner.    Functional Level Prior   Ambulation 1-->assistive equipment   Transferring 1-->assistive equipment   Toileting 2-->assistive person   Bathing 3-->assistive equipment and person   Dressing 2-->assistive person  (needing help)   Eating 0-->independent   Communication 0-->understands/communicates without difficulty   Swallowing 0-->swallows foods/liquids without difficulty   Cognition 0 - no cognition issues reported   Fall history within last six months no   Prior Functional Level Comment Pt has been at [facility]    General Information   Onset of Illness/Injury or Date of Surgery - Date 08/29/18   Referring Physician Dr. Garcia   Patient/Family Goals Statement Pt very adamantly wants to D/C home   Pertinent History of Current Problem (include personal factors and/or comorbidities that impact the POC) Pt status post I and D of R hip. See chart for additional PMH.    Precautions/Limitations right hip precautions   Weight-Bearing Status - RLE weight-bearing as tolerated   Cognitive Status Examination   Orientation orientation to person, place and time   Level of Consciousness alert   Follows Commands and Answers Questions 75% of the time;able to follow single-step instructions   Personal Safety and Judgment impaired  (anxiety impacting mobility )   Memory intact   Cognitive Comment High anxiety    Pain Assessment   Patient Currently in Pain Yes, see Vital Sign flowsheet   Integumentary/Edema   Integumentary/Edema Comments mild edema as expected  "following I and D   Posture    Posture Forward head position;Protracted shoulders   Range of Motion (ROM)   ROM Comment tolerating 90 degrees of hip flexion   Strength   Strength Comments able to perform SAQ, moderate limitations with need for momentum to perform sit<>Stand   Bed Mobility   Bed Mobility Comments SBA   Transfer Skills   Transfer Comments CGA   Gait   Gait Comments limited to 5 feet due to high anxiety today   Balance   Balance Comments poor needing heavy use of FWW   Modality Interventions   Planned Modality Interventions Cryotherapy   General Therapy Interventions   Planned Therapy Interventions balance training;bed mobility training;gait training;strengthening;transfer training;risk factor education;home program guidelines;progressive activity/exercise   Clinical Impression   Criteria for Skilled Therapeutic Intervention yes, treatment indicated   PT Diagnosis decreased functional mobility status post I&D of R hip   Influenced by the following impairments pain, decreased strength, hip prec   Functional limitations due to impairments decreased bed mob, transfers, ambulation   Clinical Presentation Stable/Uncomplicated   Clinical Presentation Rationale improving   Clinical Decision Making (Complexity) Low complexity   Therapy Frequency` daily   Predicted Duration of Therapy Intervention (days/wks) 5 days   Anticipated Equipment Needs at Discharge wheelchair   Anticipated Discharge Disposition Home with Assist;Transitional Care Facility;Home with Home Therapy   Risk & Benefits of therapy have been explained Yes   Patient, Family & other staff in agreement with plan of care Yes   [facility] [employer] \"6 Clicks\"   2016, Trustees of [facility], under license to [employer].  All rights reserved.   6 Clicks Short Forms Basic Mobility Inpatient Short Form   [facility] [employer]AC  \"6 Clicks\" V.2 Basic Mobility Inpatient Short Form   1. Turning from your back to your side while in a flat " bed without using bedrails? 4 - None   2. Moving from lying on your back to sitting on the side of a flat bed without using bedrails? 4 - None   3. Moving to and from a bed to a chair (including a wheelchair)? 3 - A Little   4. Standing up from a chair using your arms (e.g., wheelchair, or bedside chair)? 3 - A Little   5. To walk in hospital room? 2 - A Lot   6. Climbing 3-5 steps with a railing? 1 - Total   Basic Mobility Raw Score (Score out of 24.Lower scores equate to lower levels of function) 17   Total Evaluation Time   Total Evaluation Time (Minutes) 10

## 2018-08-30 NOTE — CONSULTS
.Care Transition Initial Assessment - TONY  Reason For Consult: discharge planning. Chart reviewed noting status, per information received from RN earlier today anticipate pt's discharge to rehab facility tomorrow, however in most recent discussion with RN SW has been informed ID is now being consulted due to culture results.   Met with: Patient, friend    Active Problems:    Hip osteoarthritis       DATA  Lives With: friend(s)  Living Arrangements: house, mobile home  Description of Support System: Involved  Who is your support system?: Sibling(s), Other (specify), Facility resident(s)/Staff (pt has been in rehab @ [facility])         Resources List: Skilled Nursing Facility     Quality Of Family Relationships: involved  Transportation Available:  (to be determined)    INTERVENTION    Met with pt who informs that's he has been in rehab at [facility], and while she would like to have consideration for her return home, she would elect [facility] as the facility of preference for rehab stay if needed, she did not bed hold. Referral made, and assessment completed, they would be able to accept pt tomorrow ( private room @ $38/day private room fee) at [facility], pt has been updated. Medical transport need would be anticipated, she acknowledges awareness of the private pay for the transport, discussed cost of $70/base and $4.50/mi which would not be covered by her insurance.. Pt has expressed consideration of stretcher transport, discussed that there would be a medical reason for the stretcher transport of insurance would not consider coverage for this mode of transport, discussed cost of $936/base and $23/mi for stretcher transport if not covered by her insuarnce    ASSESSMENT  Cognitive Status:  alert and oriented   PLAN  Financial costs for the patient includes; see above  Patient given options and choices for discharge Yes  Patient/family is agreeable to the plan?  Yes:   Patient Goals and Preferences:  independence wit ambulation and ADLs  Patient anticipates discharging to:  Rehab facility but would wish to consider discharge home if able.     Await MD determination of specifics of discharge needs per MDs, continue planning accordingly also based on pt's progress.

## 2018-08-30 NOTE — PROGRESS NOTES
[facility]  Orthopedics Progress Note          Assessment and Plan:   POD #1 I&D right JENAE.  No growth on cx's.  No bed hold at [facility], so will return there tomorrow.               Interval History:   no complaints and doing well; no cp, sob, n/v/d, or abd pain.              Review of Systems:   C: NEGATIVE for fever, chills, change in weight  E/M: NEGATIVE for ear, mouth and throat problems  R: NEGATIVE for significant cough or SOB  CV: NEGATIVE for chest pain, palpitations or peripheral edema             Medications:       aspirin  325 mg Oral Daily     ferrous sulfate  325 mg Oral BID     gabapentin  300 mg Oral BID     ketorolac  15 mg Intravenous Q6H     sodium chloride (PF)  3 mL Intracatheter Q8H     sulfamethoxazole-trimethoprim  1 tablet Oral BID     bisacodyl, HYDROmorphone, HYDROmorphone, hydrOXYzine, lidocaine 4%, lidocaine (buffered or not buffered), naloxone, ondansetron **OR** ondansetron, polyethylene glycol, senna-docusate, sodium chloride (PF), temazepam               Physical Exam:   Vitals were reviewed  Patient Vitals for the past 24 hrs:   BP Temp Temp src Pulse Heart Rate Resp SpO2   08/30/18 0923 - - - - - 20 -   08/30/18 0726 109/52 96  F (35.6  C) Oral - 63 20 97 %   08/30/18 0013 105/53 97.3  F (36.3  C) Oral - 70 22 99 %   08/29/18 2213 - - - - - - 95 %   08/29/18 2011 129/53 96.7  F (35.9  C) Oral 61 - 20 96 %   08/29/18 1916 113/64 - - - 73 17 100 %   08/29/18 1757 121/48 - - - 60 20 96 %   08/29/18 1715 123/54 97.9  F (36.6  C) Oral - 61 21 95 %   08/29/18 1620 128/71 97.3  F (36.3  C) Temporal - 61 10 98 %   08/29/18 1530 106/66 - - - 65 18 93 %   08/29/18 1525 (!) 119/102 - - - 65 16 94 %   08/29/18 1445 130/74 97.9  F (36.6  C) Temporal - 58 17 95 %   08/29/18 1415 106/69 - - - 59 19 98 %   08/29/18 1400 110/59 - - - 56 18 99 %   08/29/18 1345 102/73 97  F (36.1  C) Temporal - 69 20 97 %   08/29/18 1335 103/62 - - - 58 9 98 %   08/29/18 1330 - - - - 61 8 97 %    08/29/18 1325 115/56 - - - 57 21 98 %   08/29/18 1320 110/68 - - - 55 12 100 %   08/29/18 1315 104/72 - - - 64 12 100 %   08/29/18 1310 113/64 - - - 56 15 93 %   08/29/18 1305 118/62 - - - 57 10 100 %   08/29/18 1300 - - - - 65 24 100 %   08/29/18 1255 114/64 - - - 72 18 (!) 86 %   08/29/18 1253 - 96.6  F (35.9  C) Temporal - - - -   08/29/18 1055 - - - - 65 18 -   08/29/18 1052 117/60 - - - 65 16 96 %     Musculoskeletal:   Wound, scant drainage into vac dressing.  No erythema.  CMS intact. Leilani's neg.              Data:     Lab Results   Component Value Date    WBC 8.9 08/30/2018     Lab Results   Component Value Date    RBC 3.31 08/30/2018     Lab Results   Component Value Date    HGB 9.6 08/30/2018     Lab Results   Component Value Date    HCT 32.2 08/30/2018     Lab Results   Component Value Date    RDW 15.6 08/30/2018     Lab Results   Component Value Date     08/30/2018           Bry Garcia MD  8/30/2018 10:34 AM

## 2018-08-31 ENCOUNTER — APPOINTMENT (OUTPATIENT)
Dept: PHYSICAL THERAPY | Facility: CLINIC | Age: 73
DRG: 857 | End: 2018-08-31
Attending: ORTHOPAEDIC SURGERY
Payer: COMMERCIAL

## 2018-08-31 LAB
CRP SERPL-MCNC: 20.6 MG/L (ref 0–8)
ERYTHROCYTE [SEDIMENTATION RATE] IN BLOOD BY WESTERGREN METHOD: 38 MM/H (ref 0–30)

## 2018-08-31 PROCEDURE — 86140 C-REACTIVE PROTEIN: CPT | Performed by: INTERNAL MEDICINE

## 2018-08-31 PROCEDURE — 97116 GAIT TRAINING THERAPY: CPT | Mod: GP | Performed by: PHYSICAL THERAPIST

## 2018-08-31 PROCEDURE — 12000000 ZZH R&B MED SURG/OB

## 2018-08-31 PROCEDURE — 25000132 ZZH RX MED GY IP 250 OP 250 PS 637: Performed by: INTERNAL MEDICINE

## 2018-08-31 PROCEDURE — 97110 THERAPEUTIC EXERCISES: CPT | Mod: GP | Performed by: PHYSICAL THERAPIST

## 2018-08-31 PROCEDURE — 25000132 ZZH RX MED GY IP 250 OP 250 PS 637: Performed by: ORTHOPAEDIC SURGERY

## 2018-08-31 PROCEDURE — 85652 RBC SED RATE AUTOMATED: CPT | Performed by: INTERNAL MEDICINE

## 2018-08-31 PROCEDURE — 25000128 H RX IP 250 OP 636: Performed by: INTERNAL MEDICINE

## 2018-08-31 PROCEDURE — 36415 COLL VENOUS BLD VENIPUNCTURE: CPT | Performed by: INTERNAL MEDICINE

## 2018-08-31 PROCEDURE — 40000193 ZZH STATISTIC PT WARD VISIT: Performed by: PHYSICAL THERAPIST

## 2018-08-31 PROCEDURE — 25000128 H RX IP 250 OP 636: Performed by: ORTHOPAEDIC SURGERY

## 2018-08-31 RX ORDER — CEPHALEXIN 500 MG/1
500 CAPSULE ORAL 4 TIMES DAILY
Qty: 56 CAPSULE | Refills: 0 | Status: SHIPPED | OUTPATIENT
Start: 2018-08-31 | End: 2019-01-22

## 2018-08-31 RX ORDER — CEFAZOLIN SODIUM 2 G/100ML
2 INJECTION, SOLUTION INTRAVENOUS EVERY 8 HOURS
Status: DISCONTINUED | OUTPATIENT
Start: 2018-08-31 | End: 2018-09-03 | Stop reason: HOSPADM

## 2018-08-31 RX ORDER — HYDROMORPHONE HYDROCHLORIDE 2 MG/1
2-4 TABLET ORAL EVERY 4 HOURS PRN
Qty: 60 TABLET | Refills: 0 | Status: SHIPPED | OUTPATIENT
Start: 2018-08-31 | End: 2018-09-20

## 2018-08-31 RX ADMIN — SENNOSIDES AND DOCUSATE SODIUM 1 TABLET: 8.6; 5 TABLET ORAL at 19:30

## 2018-08-31 RX ADMIN — ASPIRIN 325 MG: 325 TABLET, DELAYED RELEASE ORAL at 08:35

## 2018-08-31 RX ADMIN — GABAPENTIN 300 MG: 300 CAPSULE ORAL at 19:26

## 2018-08-31 RX ADMIN — HYDROMORPHONE HYDROCHLORIDE 4 MG: 2 TABLET ORAL at 18:40

## 2018-08-31 RX ADMIN — HYDROMORPHONE HYDROCHLORIDE 4 MG: 2 TABLET ORAL at 08:35

## 2018-08-31 RX ADMIN — Medication 1 LOZENGE: at 21:24

## 2018-08-31 RX ADMIN — Medication 325 MG: at 19:26

## 2018-08-31 RX ADMIN — Medication 1 LOZENGE: at 05:04

## 2018-08-31 RX ADMIN — CEFAZOLIN SODIUM 2 G: 2 INJECTION, SOLUTION INTRAVENOUS at 16:58

## 2018-08-31 RX ADMIN — Medication 325 MG: at 08:35

## 2018-08-31 RX ADMIN — GABAPENTIN 300 MG: 300 CAPSULE ORAL at 08:35

## 2018-08-31 RX ADMIN — CEFAZOLIN SODIUM 2 G: 2 INJECTION, SOLUTION INTRAVENOUS at 05:04

## 2018-08-31 RX ADMIN — SULFAMETHOXAZOLE AND TRIMETHOPRIM 1 TABLET: 800; 160 TABLET ORAL at 08:35

## 2018-08-31 ASSESSMENT — ACTIVITIES OF DAILY LIVING (ADL)
ADLS_ACUITY_SCORE: 21
ADLS_ACUITY_SCORE: 22
ADLS_ACUITY_SCORE: 23

## 2018-08-31 NOTE — DISCHARGE SUMMARY
[facility]  Orthopedics Progress Note          Assessment and Plan:   POD#2 I&D left hip.  Staph Aureus  Growing moderate on cultures.  Susept. Awaits..  Apprec Dr. Nieto input.  Will discharge to rehab or home if patient wishes on oral Keflex, and re examine in 1 week.             Interval History:   no new complaints and doing well; no cp, sob, n/v/d, or abd pain.              Review of Systems:   C: NEGATIVE for fever, chills, change in weight  E/M: NEGATIVE for ear, mouth and throat problems  R: NEGATIVE for significant cough or SOB  CV: NEGATIVE for chest pain, palpitations or peripheral edema             Medications:       aspirin  325 mg Oral Daily     ceFAZolin  2 g Intravenous Q8H     ferrous sulfate  325 mg Oral BID     gabapentin  300 mg Oral BID     sodium chloride (PF)  3 mL Intracatheter Q8H     sore throat lozenge, bisacodyl, HYDROmorphone, HYDROmorphone, hydrOXYzine, lidocaine 4%, lidocaine (buffered or not buffered), naloxone, ondansetron **OR** ondansetron, polyethylene glycol, senna-docusate, sodium chloride (PF), temazepam               Physical Exam:   Vitals were reviewed  Patient Vitals for the past 24 hrs:   BP Temp Temp src Heart Rate Resp SpO2   08/31/18 1544 115/46 - - 69 - -   08/31/18 1540 171/74 96.1  F (35.6  C) - 72 16 95 %   08/31/18 0828 (!) 106/37 95.5  F (35.3  C) Oral 79 18 96 %   08/31/18 0438 151/49 95.8  F (35.4  C) Oral 64 20 96 %   08/30/18 2001 110/50 - - - - -   08/30/18 1642 (!) 105/38 97.7  F (36.5  C) Oral 75 16 97 %     Musculoskeletal:   Serous drainage, no erythema.              Data:     Lab Results   Component Value Date    WBC 8.9 08/30/2018     Lab Results   Component Value Date    RBC 3.31 08/30/2018     Lab Results   Component Value Date    HGB 9.6 08/30/2018     Lab Results   Component Value Date    HCT 32.2 08/30/2018     Lab Results   Component Value Date    RDW 15.6 08/30/2018     Lab Results   Component Value Date     08/30/2018            Bry Garcia MD  8/31/2018 4:25 PM

## 2018-08-31 NOTE — PLAN OF CARE
Problem: Patient Care Overview  Goal: Plan of Care/Patient Progress Review  Outcome: Improving  Pt up A1 to w/c. Can take a few steps in hallway. Pt very anxious at all times. Wants to dc home asap. Waiting for ID to see pt. SW consult added to see if pt can get home PT and possibly home health services. Wound vac inplace. Reg diet. BLE edema. Compression stockings in place. Will continue to monitor.

## 2018-08-31 NOTE — PLAN OF CARE
Problem: Patient Care Overview  Goal: Plan of Care/Patient Progress Review  Outcome: Improving  A&O x4 but very anxious. VSS. LS CTA all fields. BS active x4. Leak detected to wound vac, dressing had large amount of  Drainage, opted for sterile dressing change vs reinforcement.  Wound vac stable since change. Baseline neuropathy. Otherwise  CMS intact. esperanza regular diet well. up with A2 and walker and requires a lot of encouragment and motivation, patient very fearful.. PO Dilaudid 4mg meds managing pain. voiding in good amts. TCU vs home for  Discharge today. Will continue to monitor.

## 2018-08-31 NOTE — PLAN OF CARE
Problem: Patient Care Overview  Goal: Plan of Care/Patient Progress Review    Discharge Planner PT   Patient plan for discharge: home with friend  Current status: Fair tolerance to activity. High anxiety. Pt was able to ambulate 25 x 2 feet with FWW and W/C follow due to high anxiety about falling. Pt was CGA/SBA with sit<>stand from W/C. Discussed bathing strategies, but declined ideas about using bath bench and would like to continue to sponge bath at home. Planning to sleep in recliner.   Barriers to return to prior living situation: high anxiety  Recommendations for discharge: Pt would benefit from further PT at TCU, but adamant about discharge home, recommend home PT/OT assist from friend PRN. Would benefit from PCA services as well as unclear on how much friend is willing to help. Pt reports he is able to help with her ADLs. OT consult in hospital.  Rationale for recommendations: Pt able to ambulate household distances. Pt able to transfer with CGA. Reports has assist at home PRN.  Entered by: Jeana Kuhn 08/31/2018 3:48 PM

## 2018-08-31 NOTE — CONSULTS
ID consult dictated IMP 1 72 yo female postop R JENAE drain ing wd, SP I and D, components in, not clear deep infection, wd cx staph    REC ?? How deep cx, ? If not felt deep po, vs short course IV, ancef for now

## 2018-09-01 ENCOUNTER — APPOINTMENT (OUTPATIENT)
Dept: PHYSICAL THERAPY | Facility: CLINIC | Age: 73
DRG: 857 | End: 2018-09-01
Attending: ORTHOPAEDIC SURGERY
Payer: COMMERCIAL

## 2018-09-01 ENCOUNTER — MYC MEDICAL ADVICE (OUTPATIENT)
Dept: ORTHOPEDICS | Facility: CLINIC | Age: 73
End: 2018-09-01

## 2018-09-01 LAB
BACTERIA SPEC CULT: ABNORMAL
SPECIMEN SOURCE: ABNORMAL

## 2018-09-01 PROCEDURE — 97116 GAIT TRAINING THERAPY: CPT | Mod: GP

## 2018-09-01 PROCEDURE — 97530 THERAPEUTIC ACTIVITIES: CPT | Mod: GP

## 2018-09-01 PROCEDURE — 40000193 ZZH STATISTIC PT WARD VISIT

## 2018-09-01 PROCEDURE — 12000000 ZZH R&B MED SURG/OB

## 2018-09-01 PROCEDURE — 25000132 ZZH RX MED GY IP 250 OP 250 PS 637: Performed by: ORTHOPAEDIC SURGERY

## 2018-09-01 PROCEDURE — 25000128 H RX IP 250 OP 636: Performed by: INTERNAL MEDICINE

## 2018-09-01 RX ADMIN — HYDROMORPHONE HYDROCHLORIDE 2 MG: 2 TABLET ORAL at 01:34

## 2018-09-01 RX ADMIN — Medication 325 MG: at 19:43

## 2018-09-01 RX ADMIN — ASPIRIN 325 MG: 325 TABLET, DELAYED RELEASE ORAL at 08:38

## 2018-09-01 RX ADMIN — Medication 325 MG: at 08:38

## 2018-09-01 RX ADMIN — GABAPENTIN 300 MG: 300 CAPSULE ORAL at 08:38

## 2018-09-01 RX ADMIN — SENNOSIDES AND DOCUSATE SODIUM 1 TABLET: 8.6; 5 TABLET ORAL at 19:43

## 2018-09-01 RX ADMIN — POLYETHYLENE GLYCOL 3350 17 G: 17 POWDER, FOR SOLUTION ORAL at 08:38

## 2018-09-01 RX ADMIN — CEFAZOLIN SODIUM 2 G: 2 INJECTION, SOLUTION INTRAVENOUS at 15:43

## 2018-09-01 RX ADMIN — CEFAZOLIN SODIUM 2 G: 2 INJECTION, SOLUTION INTRAVENOUS at 01:26

## 2018-09-01 RX ADMIN — CEFAZOLIN SODIUM 2 G: 2 INJECTION, SOLUTION INTRAVENOUS at 08:42

## 2018-09-01 RX ADMIN — SENNOSIDES AND DOCUSATE SODIUM 1 TABLET: 8.6; 5 TABLET ORAL at 08:37

## 2018-09-01 RX ADMIN — HYDROMORPHONE HYDROCHLORIDE 4 MG: 2 TABLET ORAL at 06:26

## 2018-09-01 RX ADMIN — GABAPENTIN 300 MG: 300 CAPSULE ORAL at 19:43

## 2018-09-01 ASSESSMENT — ACTIVITIES OF DAILY LIVING (ADL)
ADLS_ACUITY_SCORE: 21
ADLS_ACUITY_SCORE: 19
ADLS_ACUITY_SCORE: 22
ADLS_ACUITY_SCORE: 19

## 2018-09-01 NOTE — PROGRESS NOTES
[facility]  Infectious Disease Progress Note          Assessment and Plan:   IMPRESSION:  A 73-year-old female with recent right total hip arthroplasty, postop draining wound, now status post I and D, components in place.  No clear deep infection, although cultures growing Staphylococcus aureus.  Not that significant amount of pain, question minor superficial wound infection versus this is a sign of deeper infection.       RECOMMENDATIONS:     1.  cx MSSA, no pain, not clear deep infection  2 Ancef while here but 2 weeks keflex 500 qid, wd vac and watch reasonable, if not deep surgery/vac/po abx adequate, if deep will fail this and need more intervention and prolonged abx.               Interval History:   no new complaints and doing well; no cp, sob, n/v/d, or abd pain. No pain, no fever cx MSSA              Medications:       aspirin  325 mg Oral Daily     ceFAZolin  2 g Intravenous Q8H     ferrous sulfate  325 mg Oral BID     gabapentin  300 mg Oral BID     sodium chloride (PF)  3 mL Intracatheter Q8H                  Physical Exam:   Blood pressure 112/41, pulse 61, temperature 96.4  F (35.8  C), temperature source Oral, resp. rate 16, SpO2 96 %, not currently breastfeeding.  Wt Readings from Last 2 Encounters:   07/28/18 113.4 kg (250 lb)   07/26/18 115.7 kg (255 lb)     Vital Signs with Ranges  Temp:  [95.9  F (35.5  C)-97.8  F (36.6  C)] 96.4  F (35.8  C)  Heart Rate:  [69-77] 72  Resp:  [16-18] 16  BP: (100-171)/(30-74) 112/41  SpO2:  [92 %-96 %] 96 %    Constitutional: Awake, alert, cooperative, no apparent distress   Lungs: Clear to auscultation bilaterally, no crackles or wheezing   Cardiovascular: Regular rate and rhythm, normal S1 and S2, and no murmur noted   Abdomen: Normal bowel sounds, soft, non-distended, non-tender   Skin: No rashes, no cyanosis, no edema   Other:           Data:   All microbiology laboratory data reviewed.  Recent Labs   Lab Test  08/30/18   0621   08/29/18   1028  08/02/18   0633   07/30/18   1353   04/30/18   1246  02/08/18   1907   WBC  8.9   --    --    --    --    --   12.4*  6.9   HGB  9.6*  9.8*  8.8*   < >   --    < >  14.3  14.0   HCT  32.2*   --    --    --    --    --   43.4  43.4   MCV  97   --    --    --    --    --   90  90   PLT  425   --   188   --   153   --   283  280    < > = values in this interval not displayed.     Recent Labs   Lab Test  08/01/18   0737  07/30/18   1353  07/28/18   1028   CR  0.69  0.60  0.60     Recent Labs   Lab Test  08/31/18   1649   SED  38*     Recent Labs   Lab Test  08/29/18   1244   CULT  Moderate growth  Staphylococcus aureus  *  Culture negative monitoring continues  Canceled, Test credited  Test reordered as correct code  REORDERED AS A TISSUE CULTURE

## 2018-09-01 NOTE — PROGRESS NOTES
D: Pt is refusing to go to TCU. She reports previous poor experience at TCU and that her friend Leo can provide all of her cares for her. She reports concern with d/c'ing with a wound vac and that she knows nothing about it. Pt would like to use [facility] for home care services, SW informed that it will be for RN/WOC/PT/HHA. PT open to home care services as long as it is covered by her insurance.   Pt adamant that she will have her friend Leo transport her home. She reports no concerns with transferring in and out of his vehicle.     Writer notified Care Coordinator that wound vac will need to be ordered for home. They will begin working on paperwork.      P: Hopeful discharge tomorrow, home with [facility] services as ordered by providers. Wound Vac needs to be processed and ordered for home.

## 2018-09-01 NOTE — PLAN OF CARE
Problem: Patient Care Overview  Goal: Plan of Care/Patient Progress Review  Outcome: Improving  Vital signs stable.  Lungs clear, encouraged inspirometer use.  Bowel sounds hypoactive, voiding, uses purewick when in bed.  Pt is a max assist of 2 to pivot transfer from wheelchair to commode/bed.  Pt is very anxious, needs lots of reassurance and encouragement to participate with adl's.  Bilateral leg edema, with baseline numbness.Encourage pcd use and ankle pump exercises.  On IV ancef.  Falls risk precautions.

## 2018-09-01 NOTE — CONSULTS
Informed by nursing staff, pt likely ready for dc today and in need of TCU. Previous referral was made to [facility], writer called and left message for Mervin in admissions ([phone]) and requested return call.     Awaiting return call.     Addendum: 10:30am  [facility] has bed, however need updated information for pt. Writer inboxed records. Also awaiting ID results/recommendations for abx course.  P: DC plans TBD, transport TBD- WC vs Stretcher

## 2018-09-01 NOTE — PLAN OF CARE
Problem: Patient Care Overview  Goal: Plan of Care/Patient Progress Review  Discharge Planner PT   Patient plan for discharge: Home with friend  Current status:  High anxiety. Supine to sit with SBA/, increased time. Pt was able to ambulate 20' x 2 with FWW, CGA x 2 and W/C follow. Sit to/from stand from bed with CGA x 1, min A of another. Sit to/from stand from low wheelchair with mod A x 2.   Barriers to return to prior living situation: Inconsistency with mobility, high falls risk, stairs to enter home, level of A  Recommendations for discharge: TCU.   Rationale for recommendations: Patient requiring more assistance today with mobility. Patient firmly declining TCU. If patient were to refuse and discharge to home; patient would need physical assist with all mobility, HHPT/OT and likely long term PCA services to assist friend with cares.   Entered by: Justine Carrillo 09/01/2018 10:39 AM

## 2018-09-01 NOTE — CONSULTS
Consult Date:  08/31/2018      REFERRING PHYSICIAN:  Bry Garcia MD      LOCATION:  Room 649 [facility].      IMPRESSION:  A 73-year-old female with recent right total hip arthroplasty, postop draining wound, now status post I and D, components in place.  No clear deep infection, although cultures growing Staphylococcus aureus.  Not that significant amount of pain, question minor superficial wound infection versus this is a sign of deeper infection.      RECOMMENDATIONS:     1.  Awaiting culture, Ancef for now, if not felt deep infection, conceivably 2 weeks oral therapy with something like Keflex.  I will follow up in the culture.  If it is MRSA, obviously adjust antibiotics.   2.  If there is deep infection present, unlikely any amount of antibiotics adequate with what has so far been done surgically, but conceivably doing 2 weeks IV reasonable here.  Wait for further input from    Dr. Garcia.        HISTORY OF PRESENT ILLNESS:  This 73-year-old female is seen in consultation due to a right total hip arthroplasty wound infection.  The patient has a history of undergoing total hip arthroplasty about 6 weeks ago, did well with the surgery, but postop started having some degree of drainage, has never really had significant pain and no fevers, chills or sweats.  She now has undergone an operative I and D, components were all left in place.  There was not clearcut deep infection, but operative culture growing Staph aureus.  She feels well, not having fevers and chills and not particularly pain in the area.      PAST MEDICAL HISTORY:  No major infection problems historically.      ALLERGIES:  NONE.      SOCIAL AND FAMILY HISTORY:  No obvious other exposures.  No known MRSA.      MEDICATIONS:  As listed.      REVIEW OF SYSTEMS:  Otherwise unremarkable, no systemic symptoms.      PHYSICAL EXAMINATION:   GENERAL:  The patient appears her stated age, does not look toxic or ill.   VITAL SIGNS:   Currently within normal limits.   HEENT:  No thrush or intraoral lesions.   HEART AND LUNGS:  Unremarkable.   ABDOMEN:  Soft and nontender.   EXTREMITIES:  Hip area is wrapped.  No major lower extremity erythema.      LABORATORY DATA:  Culture growing Staph aureus, sensitivity to follow.      Thank you very much for consultation.  I will follow the patient with you.         KEENAN HARVEY MD             D: 2018   T: 2018   MT: NTS      Name:     LUCIANO JONES   MRN:      [id number]        Account:       [id number]   :      1945           Consult Date:  2018      Document: [id number]       cc: Manoj Garcia MD

## 2018-09-01 NOTE — PROGRESS NOTES
Discharge Planner   Discharge Plans in progress: Yes  Barriers to discharge plan: Notified by SW that patient is declining to go to TCU and is now in need of wound vac for home. Paperwork for [facility] wound vac was started, now needing MD completion and signature before being able to proceed with process.  Follow up plan: Charge RN to contact provider to notify that paperwork is in chart and needs to be complete. Patient will also need new DC orders to reflect DC to home with wound vac and home care.          Entered by: Elin Dumas 09/01/2018 2:52 PM     Update: Contacted [facility] regarding wound vac. [facility] is able to deliver wound vac to hospital same day that MD order is received and paperwork signed. CM will fax paperwork to [facility] once order is signed.

## 2018-09-01 NOTE — PLAN OF CARE
Problem: Patient Care Overview  Goal: Plan of Care/Patient Progress Review  Outcome: Improving  A&O x4. Up w/Ax2 w/WC pivot to BSC. Did not get up overnight. Did stand at bedside this morning. Very anxious and tearful demeanor, especially about falling and not being able to move d/t the pain. BS hypoactive, passing flatus, last BM 8/27. Tolerating regular diet well. Purewick in place overnight, draining adequate amounts. BLE numbness - baseline per pt. BLE moderate edema, ramos socks on. Saline locked, given ancef. Wound vac dressing small amt of dried drainage. Pain managed with PRN PO Dilaudid. Plan is to discharge to home vs rehab when able.

## 2018-09-02 PROCEDURE — 25000132 ZZH RX MED GY IP 250 OP 250 PS 637: Performed by: INTERNAL MEDICINE

## 2018-09-02 PROCEDURE — 25000128 H RX IP 250 OP 636: Performed by: INTERNAL MEDICINE

## 2018-09-02 PROCEDURE — 12000000 ZZH R&B MED SURG/OB

## 2018-09-02 PROCEDURE — 25000132 ZZH RX MED GY IP 250 OP 250 PS 637: Performed by: ORTHOPAEDIC SURGERY

## 2018-09-02 RX ADMIN — HYDROMORPHONE HYDROCHLORIDE 4 MG: 2 TABLET ORAL at 03:43

## 2018-09-02 RX ADMIN — HYDROMORPHONE HYDROCHLORIDE 2 MG: 2 TABLET ORAL at 18:45

## 2018-09-02 RX ADMIN — Medication 325 MG: at 21:02

## 2018-09-02 RX ADMIN — POLYETHYLENE GLYCOL 3350 17 G: 17 POWDER, FOR SOLUTION ORAL at 10:59

## 2018-09-02 RX ADMIN — CEFAZOLIN SODIUM 2 G: 2 INJECTION, SOLUTION INTRAVENOUS at 09:59

## 2018-09-02 RX ADMIN — ASPIRIN 325 MG: 325 TABLET, DELAYED RELEASE ORAL at 09:59

## 2018-09-02 RX ADMIN — Medication 1 LOZENGE: at 18:47

## 2018-09-02 RX ADMIN — CEFAZOLIN SODIUM 2 G: 2 INJECTION, SOLUTION INTRAVENOUS at 00:46

## 2018-09-02 RX ADMIN — SENNOSIDES AND DOCUSATE SODIUM 1 TABLET: 8.6; 5 TABLET ORAL at 10:59

## 2018-09-02 RX ADMIN — Medication 325 MG: at 09:59

## 2018-09-02 RX ADMIN — GABAPENTIN 300 MG: 300 CAPSULE ORAL at 10:00

## 2018-09-02 RX ADMIN — GABAPENTIN 300 MG: 300 CAPSULE ORAL at 21:02

## 2018-09-02 RX ADMIN — CEFAZOLIN SODIUM 2 G: 2 INJECTION, SOLUTION INTRAVENOUS at 16:11

## 2018-09-02 RX ADMIN — HYDROXYZINE HYDROCHLORIDE 10 MG: 10 TABLET, FILM COATED ORAL at 21:02

## 2018-09-02 ASSESSMENT — ACTIVITIES OF DAILY LIVING (ADL)
ADLS_ACUITY_SCORE: 20
ADLS_ACUITY_SCORE: 19
ADLS_ACUITY_SCORE: 20
ADLS_ACUITY_SCORE: 19

## 2018-09-02 NOTE — PROGRESS NOTES
Discharge Planner   Discharge Plans in progress: Yes  Barriers to discharge plan: Patient needing [facility] Wound Vac for Home. MD Rodriguez completed and signed orders for wound vac through [facility]. Orders faxed to [facility]. Order #[id number]  Follow up plan: [facility] to confirm insurance authorization.   [facility] to update CM and Ortho Unit when wound vac available for delivery. [facility] # [phone]       Entered by: Elin Dumas 09/02/2018 12:13 [phone]

## 2018-09-02 NOTE — PROGRESS NOTES
[facility]  Infectious Disease Progress Note          Assessment and Plan:   IMPRESSION:  A 73-year-old female with recent right total hip arthroplasty, postop draining wound, now status post I and D, components in place.  No clear deep infection, although cultures growing Staphylococcus aureus.  Not that significant amount of pain, question minor superficial wound infection versus this is a sign of deeper infection.       RECOMMENDATIONS:     1.  cx MSSA, no pain, not clear deep infection  2 Ancef while here but 2 weeks keflex 500 qid, wd vac and watch reasonable, if not deep surgery/vac/po abx adequate, if deep will fail this and need more intervention and prolonged abx.   3 Stable home is plan, Follow-up ortho, a lot of questions answered              Interval History:   no new complaints and doing well; no cp, sob, n/v/d, or abd pain. No pain, no fever cx MSSA              Medications:       aspirin  325 mg Oral Daily     ceFAZolin  2 g Intravenous Q8H     ferrous sulfate  325 mg Oral BID     gabapentin  300 mg Oral BID     sodium chloride (PF)  3 mL Intracatheter Q8H                  Physical Exam:   Blood pressure 119/65, pulse 79, temperature 96.7  F (35.9  C), temperature source Oral, resp. rate 16, SpO2 95 %, not currently breastfeeding.  Wt Readings from Last 2 Encounters:   07/28/18 113.4 kg (250 lb)   07/26/18 115.7 kg (255 lb)     Vital Signs with Ranges  Temp:  [96.7  F (35.9  C)-98.4  F (36.9  C)] 96.7  F (35.9  C)  Pulse:  [79] 79  Heart Rate:  [74-76] 74  Resp:  [16] 16  BP: (115-133)/(45-65) 119/65  SpO2:  [94 %-98 %] 95 %    Constitutional: Awake, alert, cooperative, no apparent distress   Lungs: Clear to auscultation bilaterally, no crackles or wheezing   Cardiovascular: Regular rate and rhythm, normal S1 and S2, and no murmur noted   Abdomen: Normal bowel sounds, soft, non-distended, non-tender   Skin: No rashes, no cyanosis, no edema   Other:           Data:   All  microbiology laboratory data reviewed.  Recent Labs   Lab Test  08/30/18   0621  08/29/18   1028  08/02/18   0633   07/30/18   1353   04/30/18   1246  02/08/18   1907   WBC  8.9   --    --    --    --    --   12.4*  6.9   HGB  9.6*  9.8*  8.8*   < >   --    < >  14.3  14.0   HCT  32.2*   --    --    --    --    --   43.4  43.4   MCV  97   --    --    --    --    --   90  90   PLT  425   --   188   --   153   --   283  280    < > = values in this interval not displayed.     Recent Labs   Lab Test  08/01/18   0737  07/30/18   1353  07/28/18   1028   CR  0.69  0.60  0.60     Recent Labs   Lab Test  08/31/18   1649   SED  38*     Recent Labs   Lab Test  08/29/18   1244   CULT  Moderate growth  Staphylococcus aureus  *  Culture negative monitoring continues  Canceled, Test credited  Test reordered as correct code  REORDERED AS A TISSUE CULTURE

## 2018-09-02 NOTE — DISCHARGE SUMMARY
[facility]  Orthopedics Progress Note             Interval History:     No new complaints today  Pain: mild  Nausea: none  Light headedness : none  Chest pain: none  Shortness of breath: none                  Physical Exam:   Patient Vitals for the past 12 hrs:   BP Temp Temp src Pulse Heart Rate Resp SpO2   09/02/18 0855 119/65 96.7  F (35.9  C) Oral 79 - 16 95 %   09/02/18 0428 - - - - - 16 -   09/02/18 0343 - - - - - 16 -   09/02/18 0058 - - - - - 16 -   09/01/18 2359 122/50 98.4  F (36.9  C) Oral - 74 16 94 %     Hemoglobin   Date Value Ref Range Status   08/30/2018 9.6 (L) 11.7 - 15.7 g/dL Final   08/29/2018 9.8 (L) 11.7 - 15.7 g/dL Final       Patient is alert and oriented.  Vitals: stable  Neurovascular status: intact  Dressing: clean and dry  Calves: soft and non tender             Assessment and Plan:   S/p JENAE right, about one month ago  I and D for wound infection from this admission  Going home instead of TCU today  The form for home wound VAC signed  Oral ABx per ID  Follow up with MIN[facility] Brooks in about a week       Chavo Rodriguez MD  9/2/2018

## 2018-09-02 NOTE — PLAN OF CARE
Problem: Patient Care Overview  Goal: Plan of Care/Patient Progress Review  Outcome: Improving  A&O x4. Up w/Ax2 w/WC pivot to BSC. Did not get up overnight. Very anxious and fearful. BS+, passing flatus, last BM 8/27. Tolerating regular diet well. Purewick in place overnight, draining adequate amounts. BLE numbness - baseline per pt. BLE moderate edema, ramos socks on. Saline locked, given ancef. Wound vac dressing small amt of dried drainage. Pain managed with PRN PO Dilaudid. Plan is to discharge to home with wound vac today.

## 2018-09-02 NOTE — DISCHARGE INSTRUCTIONS
Your home care referral was sent to [facility].  If you haven't heard from them within the next 24-48 hours,  Please call them at [phone]    Return to clinic in10-14 days. Call [phone] to schedule or if you experience any problems before your scheduled appointment.    See general discharge instruction sheet for hips.  1. Do exercises as instructed by therapist.  2. Observe your hip precautions.  3. Walk daily increasing distance and time each day by a little.  4. Ice hip for swelling and discomfort.  5. May shower, inspect dressing daily for problems listed below   6.Notify your dentist or physician of your implant so you can get antibiotics before any dental work or any other invasive procedure (ie: colonoscopy).  7. Do not cross legs.    Aquacel dressing:  DO NOT CHANGE DRESSING DAILY.  Leave this dressing on until follow-up appointment with Orthopedic Surgeon.  Dressing is waterproof, can shower with it on, pat dry when done.  No soaking such as in tub baths, pools or hot tubs    While on narcotic pain medication, to prevent constipation:  1. Drink plenty of water to keep well hydrated   2. May take over the counter Colace or Senna (follow instructions on label)     Call your physician if: ([phone])   1. Persistent fever greater than 100 degrees with body chills or excessive sweating.  2. Increased/persistent redness, localized warmth, tenderness, drainage or swelling at incision site. Greater than 50% drainage on dressing.   3. Pain not controlled with oral pain medications, ice and rest.   4. No bowel movement in 3 days (may use Milk of Magnesia or other over the counter remedy).  5. Chest pain, shortness of breath, and/or calf pain with excessive swelling.  6. Generalized feeling of illness.  7. Any other questions or concerns related to your surgery/recovery.      Thank you for allowing [facility] to participate in your cares!!

## 2018-09-02 NOTE — PLAN OF CARE
Problem: Patient Care Overview  Goal: Plan of Care/Patient Progress Review  Outcome: No Change  A&Ox4, up assist x3 to WC using walker and gaitbelt, tolerating regular diet, heart sounds- WNL, lungs- clear, bowel sounds- active, voiding without difficulty, BM this shift, edema +3 bilateral lower extremities, wound vac in place, dressing has dried drainage. Awaiting to hear from insurance regarding wound vac for patient to discharge with.

## 2018-09-02 NOTE — PLAN OF CARE
Problem: Patient Care Overview  Goal: Plan of Care/Patient Progress Review  PT: Per chart, patient with plan to discharge home this PM.     Physical Therapy Discharge Summary    Reason for therapy discharge:    Discharged to home with home therapy.    Progress towards therapy goal(s). See goals on Care Plan in [facility] electronic health record for goal details.  Goals not met.  Barriers to achieving goals:   discharge from facility.    Therapy recommendation(s):    TCU was recommended.

## 2018-09-03 VITALS
HEART RATE: 76 BPM | RESPIRATION RATE: 18 BRPM | DIASTOLIC BLOOD PRESSURE: 50 MMHG | OXYGEN SATURATION: 96 % | SYSTOLIC BLOOD PRESSURE: 117 MMHG | TEMPERATURE: 96.4 F

## 2018-09-03 PROCEDURE — 90791 PSYCH DIAGNOSTIC EVALUATION: CPT | Performed by: PSYCHOLOGIST

## 2018-09-03 PROCEDURE — 25000128 H RX IP 250 OP 636: Performed by: INTERNAL MEDICINE

## 2018-09-03 PROCEDURE — 25000132 ZZH RX MED GY IP 250 OP 250 PS 637: Performed by: ORTHOPAEDIC SURGERY

## 2018-09-03 RX ADMIN — HYDROMORPHONE HYDROCHLORIDE 4 MG: 2 TABLET ORAL at 16:22

## 2018-09-03 RX ADMIN — HYDROMORPHONE HYDROCHLORIDE 2 MG: 2 TABLET ORAL at 05:47

## 2018-09-03 RX ADMIN — Medication 325 MG: at 08:32

## 2018-09-03 RX ADMIN — GABAPENTIN 300 MG: 300 CAPSULE ORAL at 08:32

## 2018-09-03 RX ADMIN — CEFAZOLIN SODIUM 2 G: 2 INJECTION, SOLUTION INTRAVENOUS at 00:20

## 2018-09-03 RX ADMIN — CEFAZOLIN SODIUM 2 G: 2 INJECTION, SOLUTION INTRAVENOUS at 08:34

## 2018-09-03 RX ADMIN — HYDROMORPHONE HYDROCHLORIDE 4 MG: 2 TABLET ORAL at 00:19

## 2018-09-03 RX ADMIN — HYDROMORPHONE HYDROCHLORIDE 4 MG: 2 TABLET ORAL at 11:09

## 2018-09-03 RX ADMIN — ASPIRIN 325 MG: 325 TABLET, DELAYED RELEASE ORAL at 08:32

## 2018-09-03 ASSESSMENT — ACTIVITIES OF DAILY LIVING (ADL)
ADLS_ACUITY_SCORE: 19
ADLS_ACUITY_SCORE: 20

## 2018-09-03 NOTE — CONSULTS
"This document was created with voice recognition software.  As result, there may be errors in grammar and syntax.  Please consider this when reading this document.    Psychiatric consult, under supervision of Dr. Serrano, ordered by Dr. Garcia.   This was an initial consult, which took 55 minutes, with more than half the time coordinating care.    Summary of stay  Cristy Umaña is a 73-year-old  female who had hip surgery, developed complications and was admitted for treatment.  Discharge planning has been complicated by patient's refusal to accept referral to TCU.  She is adamant about returning home, but her partner earlier today told nursing staff that he feels unable to care for her.      Reason for consult  I was asked to see this patient to help with psychiatric/behavioral differential diagnosis, assess suicidal and behavioral risk factors,  to facilitate a medication consultation with Dr. Serrano, and to provide input into discharge planning.     Records reviewed   H&P done by Jose, including review of systems,  as well as chart review of Care Everywhere, previous admissions and  the pharmacy admission note.     Progress notes/consults  I reviewed recent nursing progress notes, infectious disease consultations, and conferred with current nursing staff.    Previous treatment records  There are no psychiatric/behavioral treatment records in the [facility] system.  A brief review of the Care Everywhere section did not locate any obvious psychiatric/behavioral treatment records, going back to 1996.    Collateral Information  Shanthi's partner of 40 years, Leo, was visiting and she agreed to authorize him to participate in the interview.  He readily agreed to participate.  In contrast to what he apparently reported to staff shortly before my interview, he told me, bluntly, that he wants her to return home and he feels capable of caring for her.  In fact, he said, \"going back to rehab will kill her.  I can " "do everything at home that they would do for her at rehab.\"  Given the information from unit staff, I revisited discharge planning several times during the interview and Leo was adamant that he wants her to be discharged to his care at home.  Several times during the interview I gave him the opportunity to discuss any concerns that he has about discharge planning, and he declined.    Patient Report of Admission Events/Circumstances  Shanthi is focused on being discharged to home at this time.  She reports that she had difficulty experiences at \"rehab\" previously, and was adamant that, \"I can do for myself everything that they do for me at rehab.      Mental and Chemical Health Treatment History    Shanthi denies history of any psychiatric, behavioral, or IGLESIA services.     Social Background  Shanthi has been in a relationship with Leo for 40 years.  She has 3 grown children, Leo is not their father and I did not get into the circumstances of this.  Shanthi reports good relations with her children.    Behavioral Assessment  Shanthi was visiting with Leo, and both were watching TV, when I introduced myself.  She greeted me in a friendly manner.  Both appeared to be relaxed.  She was composed and interacted with me in a pleasant and engaging manner.  This is strikingly different than recent behaviors reported by unit staff, who had been concerned about her anxious and demanding behaviors.  With me, she was pleasant, imposed, and engaging.  This indicates a likely volitional element to her recent complaints and behaviors.    Shanthi his appearance is typical for her age and medical status.  She is oriented in all spheres.  She made normal eye contact throughout the interview.  No unusual movements were observed. Muscle strength/tone, gait and station are deferred to the hospitalist team.     Shanthi speech was fluent, logical, goal-directed, nonpressured and more articulate than usual.  It appears likely that her IQ is Above " "Average, and her fund of knowledge is cautiously estimated to be in the same range.  Attention span and concentration were normal.  Memory functions are all intact.  Her thought processes are linear, logical, and with no peculiarities.    She described her mood as \"good,\" and emphasized that she is eager to be discharged home.  Her affect was consistently euthymic.  She does not endorse current symptoms of depression or anxiety.  She denies history of hallucinations.  She denies suicidal ideation, and any history of suicidal behavior.    Insight/judgement are both poor, but her problems in this area do not rise to the level of considering holding her or petitioning for commitment.    Impressions  Strengths: Shanthi apparently can be very pleasant and engaging when it suits her.  Liabilities: Shanthi reportedly can be quite difficult, including being manipulative and demanding.    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  Discharge to the care of her partner at their home.  2.  Her community medical providers can provide crucial ongoing monitoring of her functioning in the home environment.    Mateo Sena Psy.D., L.P.  [phone]        "

## 2018-09-03 NOTE — PROGRESS NOTES
ROBERT received VM from Zo at  [facility] re: wound vac. She left call back number that is invalid - [phone] ext [phone]. CM tried multiple variations of number w/o success. Checked paperwork for call back number. [facility] number is [phone]. Contacted Baldemarjuan f to inquire about status. She stated  That the local service center should be delivering home vac today.     ROBERT will continue to follow patient until discharge for any additional needs.     Josefina Akbar RN, BSN, CTS  [facility]  [phone]    Addendum 12:25 CM received another call from [facility] ([phone]).He needs additional information for delivery of wound vac. Replayed message twice to verify call back number. ROBERT returned his call within 3 minutes and the message stated that number was not accepting calls at this time. Recontacted Baldemarjuan f at [facility] number to get status update. Orestes (from [facility] to deliver vac by 1500 today. Updated chg RN on Ortho Unit.      keeley

## 2018-09-03 NOTE — PLAN OF CARE
Problem: Patient Care Overview  Goal: Plan of Care/Patient Progress Review  Outcome: Improving  A&O. VSS. Dressing and wound vac in place. Taking oral dilaudid for pain. Anxious and tearful once, able to calm down. Slept well. Voiding using pure wick overnight.

## 2018-09-03 NOTE — PROGRESS NOTES
[facility]  Infectious Disease Progress Note          Assessment and Plan:   IMPRESSION:  A 73-year-old female with recent right total hip arthroplasty, postop draining wound, now status post I and D, components in place.  No clear deep infection, although cultures growing Staphylococcus aureus.  Not that significant amount of pain, question minor superficial wound infection versus this is a sign of deeper infection.       RECOMMENDATIONS:     1.  cx MSSA, no pain, not clear deep infection  2 Ancef while here , then 2 weeks keflex 500 qid, wd vac and watch , if not deep infection surgery/vac/po abx adequate, if deep will fail this and need more intervention and prolonged abx.   3 Stable home is plan, Follow-up ortho,             Interval History:   no new complaints and doing well; no cp, sob, n/v/d, or abd pain. No pain, no fever cx MSSA              Medications:       aspirin  325 mg Oral Daily     ceFAZolin  2 g Intravenous Q8H     ferrous sulfate  325 mg Oral BID     gabapentin  300 mg Oral BID     sodium chloride (PF)  3 mL Intracatheter Q8H                  Physical Exam:   Blood pressure 117/50, pulse 76, temperature 96.4  F (35.8  C), temperature source Oral, resp. rate 18, SpO2 96 %, not currently breastfeeding.  Wt Readings from Last 2 Encounters:   07/28/18 113.4 kg (250 lb)   07/26/18 115.7 kg (255 lb)     Vital Signs with Ranges  Temp:  [96.4  F (35.8  C)-98.4  F (36.9  C)] 96.4  F (35.8  C)  Pulse:  [76] 76  Heart Rate:  [68-82] 68  Resp:  [16-18] 18  BP: (117-151)/(50-67) 117/50  SpO2:  [95 %-97 %] 96 %    Constitutional: Awake, alert, cooperative, no apparent distress   Lungs: Clear to auscultation bilaterally, no crackles or wheezing   Cardiovascular: Regular rate and rhythm, normal S1 and S2, and no murmur noted   Abdomen: Normal bowel sounds, soft, non-distended, non-tender   Skin: No rashes, no cyanosis, no edema   Other:           Data:   All microbiology laboratory data  reviewed.  Recent Labs   Lab Test  08/30/18   0621  08/29/18   1028  08/02/18   0633   07/30/18   1353   04/30/18   1246  02/08/18   1907   WBC  8.9   --    --    --    --    --   12.4*  6.9   HGB  9.6*  9.8*  8.8*   < >   --    < >  14.3  14.0   HCT  32.2*   --    --    --    --    --   43.4  43.4   MCV  97   --    --    --    --    --   90  90   PLT  425   --   188   --   153   --   283  280    < > = values in this interval not displayed.     Recent Labs   Lab Test  08/01/18   0737  07/30/18   1353  07/28/18   1028   CR  0.69  0.60  0.60     Recent Labs   Lab Test  08/31/18   1649   SED  38*     Recent Labs   Lab Test  08/29/18   1244   CULT  Moderate growth  Staphylococcus aureus  *  Culture negative monitoring continues  Canceled, Test credited  Test reordered as correct code  REORDERED AS A TISSUE CULTURE

## 2018-09-03 NOTE — PROGRESS NOTES
Reviewed discharge instructions and medications with patient and Leo QUIJANO. Questions answered. Patient discharged to home with significant other driving, discharge instructions, medication scripts for Keflex and PO Dilaudid, and belongings at this time.

## 2018-09-03 NOTE — PLAN OF CARE
Problem: Patient Care Overview  Goal: Plan of Care/Patient Progress Review  Outcome: Improving  Vital signs stable.  Lungs clear, encouraged inspirometer use.  Bowel sounds hypoactive, voiding, uses purewick at hs.  CMS, baseline numbness in feet , legs with 3t edema.  Pt c/o pain in right hip and legs, medicated with po dilaudid, ice pack applied to right hip.Frequent repositioning.Pt very anxious and tearful, needs lots of encouragement and reassurance, prior to performing any task or adl's.  Max assist of 2 to transfer with walker and gait belt from recliner to bed.  Dressing intact, small    amount dried drainage, wound vac intact. On iv ancef.  Falls risk precautions.Plan of care reviewed with pt and significant other.

## 2018-09-03 NOTE — PLAN OF CARE
Problem: Patient Care Overview  Goal: Plan of Care/Patient Progress Review  Outcome: Improving  Pt up A2 c walker/gait belt. Pt able to stand on own without assistance and ambulated a short way in hallway. Pt very manipulative, attention seeking, anxious,wheepy. Obtained psych consult as soon asMD went in she was calm and cooperative, something that she has not been in entire hospital stay. Sig other who cares for her and lives with her approached staff about wanting pt to see psych and go to rehab not home bc he cant care for her anymore. When sig other confronted by MD in front of pt, he denied. New wound vac placed, education completed. Compression socks on. Pain managed with dilaudid po. Pt called staff foul names, but then cried and apologized then staff left for brief moment pt not crying, walk back into room pt cries. Pt to discharge this evening. Calling ride at this moment. Had BM this shift, voiding onto towels and floor. Will continue to monitor.

## 2018-09-04 ENCOUNTER — TELEPHONE (OUTPATIENT)
Dept: FAMILY MEDICINE | Facility: CLINIC | Age: 73
End: 2018-09-04

## 2018-09-04 ENCOUNTER — PATIENT OUTREACH (OUTPATIENT)
Dept: CARE COORDINATION | Facility: CLINIC | Age: 73
End: 2018-09-04

## 2018-09-04 DIAGNOSIS — T14.8XXA OPEN WOUND: Primary | ICD-10-CM

## 2018-09-04 ASSESSMENT — ACTIVITIES OF DAILY LIVING (ADL): DEPENDENT_IADLS:: COOKING;CLEANING;LAUNDRY;SHOPPING;MEAL PREPARATION;TRANSPORTATION

## 2018-09-04 NOTE — PROGRESS NOTES
Clinic Care Coordination Contact    Clinic Care Coordination Contact  OUTREACH    Referral Information:  Referral Source: IP Report  Wash-out of left hip wound.  Cx's grew Staph Aureus, will rx with oral Keflex QID for 2 weeks    Primary Diagnosis: Orthopedic    Chief Complaint   Patient presents with     Clinic Care Coordination - Post Hospital        Universal Utilization: Appropriate.  Clinic Utilization  Difficulty keeping appointments:: Yes  Compliance Concerns: Yes  No-Show Concerns: Yes  No PCP office visit in Past Year: No  Utilization    Last refreshed: 9/3/2018  7:01 PM:  No Show Count (past year) 1       Last refreshed: 9/3/2018  7:01 PM:  ED visits 2       Last refreshed: 9/3/2018  7:01 PM:  Hospital admissions 2          Current as of: 9/3/2018  7:01 PM             Clinical Concerns:  Current Medical Concerns:  Was discharged from hospital yesterday and has wound vac.  Has been wearing it since she returned to home.  There was discussion of discharging back to a TCU, but patient and her partner said they can do it in her home where she is most comfortable.  Talked to patient and she was alone as Leo was getting her pain medications.  She is unsure how long she should use the wound vac. She is in significant pain and was crying during much of the call. She slept ok.  Is concerned that the wheelchair that is being delivered today from [employer] will be too small.  It will press on her wound.  She has not heard from home care about starting service.      Current Behavioral Concerns: Patient was upset as she is in pain.  Per her history with CC, after talking a bit, she was able to calm down and talk with CC about her situation.      Education Provided to patient: Discussed wound vac and reviewed discharge instructions.     Pain  Pain (GOAL):: Yes  Type: Acute on Chronic  Alleviating Factors: Pain Medication  Health Maintenance Reviewed: Due/Overdue   Health Maintenance Due   Topic Date Due     HEPATITIS C  SCREENING  05/30/1963     PNEUMOCOCCAL (1 of 2 - PCV13) 05/30/2012     MAMMO SCREEN Q2 YR (SYSTEM ASSIGNED)  08/01/2013     INFLUENZA VACCINE (1) 09/01/2018     Clinical Pathway: None    Medication Management:  Leo zacarias.       Functional Status:  Dependent ADLs:: Wheelchair-independent, Ambulation-walker  Dependent IADLs:: Cooking, Cleaning, Laundry, Shopping, Meal Preparation, Transportation  Bed or wheelchair confined:: Yes  Mobility Status: Dependent/Assisted by Another  Fallen 2 or more times in the past year?: No  Any fall with injury in the past year?: No    Living Situation:  Current living arrangement:: I live in a private home with spouse  Type of residence:: Private home - no stairs    Diet/Exercise/Sleep:  Diet:: Regular  Inadequate nutrition (GOAL):: No  Food Insecurity: No  Tube Feeding: No  Exercise:: Unable to exercise  Inadequate activity/exercise (GOAL):: No  Significant changes in sleep pattern (GOAL): No    Transportation:  Transportation concerns (GOAL):: No  Transportation means:: Regular car, Family     Psychosocial:  Taoist or spiritual beliefs that impact treatment:: No  Mental health DX:: Yes  Mental health DX how managed:: Medication  Mental health management concern (GOAL):: Yes  Informal Support system:: Family, Spouse     Financial/Insurance:   Financial/Insurance concerns (GOAL):: No  [employer] for Seniors.      Resources and Interventions:  Current Resources:    ;   Community Resources: None  Supplies used at home:: Wound Care Supplies (wound vac)  Equipment Currently Used at Home: walker, standard, wheelchair, manual, other (see comments)    Advance Care Plan/Directive  Advanced Care Plans/Directives on file:: No    Referrals Placed: Home Care     Goals:   Goals        General    Pain Management (pt-stated)     Notes - Note created  4/5/2018  2:53 PM by Francisca Hernandez LSW    Goal Statement: I will have less pain.  Measure of Success: Will have intervention that addresses my pain.    Supportive Steps to Achieve: CC will contact PCP to discuss patient concerns and next steps.   Barriers: Cannot leave the house without extreme effort. Is having extreme pain and does not have any sense that it will get better. Her  is her caregiver and he is frustrated with her pain.  Strengths: Has  who supports her.    Date to Achieve By: 9-1-2018              Patient/Caregiver understanding: She is expecting [facility] to deliver her wheelchair this afternoon. She is concerned that it will be a 22 inch wheelchair and that will be too small. She will try it and if it is too small, she will return it.  She has not heard from  Home Care yet.  She would like some help at home and expected home care to start.  CC called [facility] care and they do not have an order for home care to start.  CC completed order and routed to Dr. Garcia for signature.  CC will call patient later today to discuss her needs.      Outreach Frequency: weekly  Future Appointments              In 6 days Donal Grover PA-C [facility] [facility]          Plan: Call later today to discuss her questions and see how the wheelchair works.  CC will monitor for start of home care.     Francisca Hernandez,   [facility]  [email or web address]  [phone]

## 2018-09-04 NOTE — TELEPHONE ENCOUNTER
Please contact patient for In-patient follow up.  [phone] (home) none (work)    Visit date: 9/3/2018  Diagnosis listed:S/P Hip Replacement, Right  Number of visits in past 12 months:1/2

## 2018-09-04 NOTE — TELEPHONE ENCOUNTER
ED / Discharge Outreach Protocol    Patient Contact    Attempt # 1    Was call answered?  No.  Unable to leave message.  Received busy signal, tried a couple of times.

## 2018-09-04 NOTE — TELEPHONE ENCOUNTER
See MyChart Message. Do you have any input/ knowledge of the what to do in regards to the wound vac, or if it is still needed?    Keira Santiago, ATC

## 2018-09-05 LAB
BACTERIA SPEC CULT: NORMAL
Lab: NORMAL
SPECIMEN SOURCE: NORMAL

## 2018-09-05 NOTE — PROGRESS NOTES
Clinic Care Coordination Contact    Clinic Care Coordination Contact  OUTREACH    Referral Information:  Referral Source: IP Report    Primary Diagnosis: Orthopedic    Chief Complaint   Patient presents with     Clinic Care Coordination - [facility]     Utilization    Last refreshed: 9/4/2018 11:36 AM:  No Show Count (past year) 1       Last refreshed: 9/4/2018 11:36 AM:  ED visits 2       Last refreshed: 9/4/2018 11:36 AM:  Hospital admissions 2          Current as of: 9/4/2018 11:36 AM             Clinical Concerns:  Current Medical Concerns:  In a lot of pain.  The wheelchair delivered by [employer] did not fit.  SHe sent it back. She is very anxious to get a wheelchair and is willing to rent one.  She is not sure what [employer] can do for her.  She did get a call from home care and is expecting a nurse at 1 PM today.   She has her pain medication and is taking them.      Resources and Interventions:  Current Resources: [facility], RN is starting today at 1 PM      Referrals Placed: Home Care     Goals:   Goals        General    Pain Management (pt-stated)     Notes - Note created  4/5/2018  2:53 PM by Francisca Hernandez LSW    Goal Statement: I will have less pain.  Measure of Success: Will have intervention that addresses my pain.   Supportive Steps to Achieve: CC will contact PCP to discuss patient concerns and next steps.   Barriers: Cannot leave the house without extreme effort. Is having extreme pain and does not have any sense that it will get better. Her  is her caregiver and he is frustrated with her pain.  Strengths: Has  who supports her.    Date to Achieve By: 9-1-2018              Patient/Caregiver understanding: She asked CC to check with [employer] to see what options are available.  CC called [employer] and left a message for Saskia Luna to discuss what options are available for a larger wheelchair.  CC called [employer] and they have a large wheelchair 24x18 that cost $85 a month to  tacho.  Patient to complete intake with RN today.      Call back from [facility]. Saskia will consult with Mary, staff at [facility] and will see if they can order a new WC without another evaluation.  Informed Saskia that patient is now working with [employer] if an OT assessment is needed.     Call back from Saskia Hartman.  She talked with Mary who thinks she should go to patient's home to discuss her needs and to measure her doorways to see if a larger chair would work.  She can work with [employer] to set up OT visit if necessary.  Mary can arrange a loaner chair at no cost if needed.  Saskia asked CC to call with  at [employer].  Called [employer] and left a message asking for a call back for  Mariah Gaspar RN. Called Saskia at [facility].  She talked to patient yesterday and told her they will get her a loaner chair ASAP.  They do need to work with an OT to get assessment again.  She can get a large loaner chair, not as light weight as her current chair.      Call back from JEEVAN Lemus who did intake yesterday with patient and has questions about discharge plans. She has questions about treatment with the wound vac and dressings, date of discharge, and the paper work notes it was her left hip, and it was her right hip.  She has left a message for the surgeon for clarification.  Patient did have AVS and nurse used that for direction, but there was conflicting information on the discharge summary.  The information that nurse had for admission was not adequate.  Gave Mariah information about getting a wheelchair and she will work with [facility] to get OT visit set up.  Provided her with Saskia's number.  She will be contacting PA to get orders set up for OT and for PT. Patient is not moving much, but denied being incontinent so is getting to and from her bathroom.  She will encourage patient to work with PT.  Patient did cry when taking off the wound vac and dressings yesterday. CC will work  with RN while open to home care.     Outreach Frequency: weekly  Future Appointments              In 5 days Donal Grover PA-C [facility] ORTHOPEDIC SURGERY, [facility] [place] [facility]          Plan: Assist with home care.    Francisca Hernandez,   [facility]  [email or web address]  [phone]

## 2018-09-06 ENCOUNTER — TELEPHONE (OUTPATIENT)
Dept: ORTHOPEDICS | Facility: CLINIC | Age: 73
End: 2018-09-06

## 2018-09-06 NOTE — TELEPHONE ENCOUNTER
Left message for Elin, home health care nurse with instructions below.   Asked that she call back for further questions.   Also asked that she have patient call to reschedule appointment to preferably 9/13/18 so she is at 14 days post op for staple removal.     DIEGO Javed, RN

## 2018-09-06 NOTE — TELEPHONE ENCOUNTER
"Cristy Bailey is a 73 year old female whose home care nurse from [facility] called with several questions. She saw the patient yesterday to start care.    1) Clarify when patient was discharged  Paperwork says 9/3 but patient is adamant that it was  9/4/18. She stated they kept her overnight in order to get the wound vac.    2) All instructions say \"left hip\" and patient has dressing on RIGHT hip.     3) Patient left the hospital with a wound vac.  Instructions said to discontinue the wound vac on 9/5/18 and change to a dry dressing daily. Then there is a section that states NOT to change the dressing as patient has an Aquacell dressing on to be removed by surgeon's office.   She states she removed the wound vac yesterday and placed ABD pads with medipore tape as patient did not have any supplies. However, she does have about 2 months of supplies of wound vac dressings.   Please clarify if patient is to keep wound vac, or what type of dressing is to be placed and how often it should be changed.     Wound vac appeared to have been in place 24 hrs with approximately 20 ml of serosanguineous drainage.   Patient also still has staples present, and some areas have skin growing over them.   If home care is to remove staples, it will take her several days to get a staple remover kit ordered.     4) Please clarify when staples are to be removed and by whom.     She is planning on seeing the patient again today around 1 pm. Gave her dates of surgery 8/30/18 for I&D and 8/2/18 for R JENAE by Dr. Garcia.     Nurse expecting call back: YES      Preferred contact number:  [phone]  Can we leave a detailed message on this number: YES     Please advise on all questions above.     DIEGO Javed RN            "

## 2018-09-06 NOTE — TELEPHONE ENCOUNTER
1.  Pt signed and dated discharge 9/3 in hand writing, scanned in epic.  2.  It is right hip  3.  Per Dr. Garcia - Regular dry dressings with changes/checks daily to keep incision dry, no vac.  4. Staples will not grow in, should be checked in clinic about 14 days post op.    Donal Grover PA-C  [facility] - Surgery

## 2018-09-07 ENCOUNTER — TELEPHONE (OUTPATIENT)
Dept: ORTHOPEDICS | Facility: CLINIC | Age: 73
End: 2018-09-07

## 2018-09-07 NOTE — TELEPHONE ENCOUNTER
Received call from [employer] asking status of patient's wound vac.   Informed it was discontinued on 9/5/18.  Explained that there was a discrepancy on her discharge paperwork and home care nurse had called yesterday to clarify wound vac orders.   She verbalized understanding.   She can be reached at: [phone] if questions.     DIEGO Javed RN

## 2018-09-10 ENCOUNTER — TELEPHONE (OUTPATIENT)
Dept: ORTHOPEDICS | Facility: CLINIC | Age: 73
End: 2018-09-10

## 2018-09-10 NOTE — TELEPHONE ENCOUNTER
[facility] left voicemail regarding patient request for a refill for hydromorphone 2mg.     Phone call to Enio Medina, who states they deliver to patient's home and also can  a prescription for patient and deliver to patient. They are located in [place]. Will discuss with patient and call them back.     Left message for patient to return call. Need more details of how often she is taking the hydromorphone, how many she has left, and find out if she wants pharmacy to  prescription.     DIEGO Javed, RN

## 2018-09-11 NOTE — TELEPHONE ENCOUNTER
Patient called back and left voicemail returning phone call from Daniela.      Called patient back and spoke with her.  She states that she currently has 19 tabs remaining of the hydromorphone and typically only uses it at night.  Discussed with patient that it probably is not needed to be refilled at this time and can be discussed further at follow-up appointment with David on September 13.  She verbalized understanding with this and feels that the [facility] pharmacy was likely confused about refill of this medication.    No further action needed will discuss further at follow-up appointment.    Cali Hood, ATC

## 2018-09-12 DIAGNOSIS — Z96.641 S/P HIP REPLACEMENT, RIGHT: ICD-10-CM

## 2018-09-12 DIAGNOSIS — M79.604 BILATERAL LEG PAIN: ICD-10-CM

## 2018-09-12 DIAGNOSIS — D50.8 OTHER IRON DEFICIENCY ANEMIA: ICD-10-CM

## 2018-09-12 DIAGNOSIS — R29.898 WEAKNESS OF BOTH LOWER EXTREMITIES: ICD-10-CM

## 2018-09-12 DIAGNOSIS — K59.03 CONSTIPATION DUE TO PAIN MEDICATION: ICD-10-CM

## 2018-09-12 DIAGNOSIS — M79.605 BILATERAL LEG PAIN: ICD-10-CM

## 2018-09-12 NOTE — TELEPHONE ENCOUNTER
Meds not ordered by PCP. Carol from [facility] requesting refills. LOV 6/8/2018.     Creatinine   Date Value Ref Range Status   08/01/2018 0.69 0.52 - 1.04 mg/dL Final     Hemoglobin   Date Value Ref Range Status   08/30/2018 9.6 (L) 11.7 - 15.7 g/dL Final   ]    Please send to pharmacy if approved.     Teri WHITE RN

## 2018-09-12 NOTE — TELEPHONE ENCOUNTER
"    ED/Discharge Protocol    \"Hi, my name is Emelia JONESAntonio Johnson, a registered nurse, and I am calling on behalf of Kulwant Daley's office at [place].  I am calling to follow up and see how things are going for you after your recent visit.\"    \"I see that you were in the (ER/UC/IP) on 8/29/18.    How are you doing now that you are home?\" doing ok - people coming out for therapy    Is patient experiencing symptoms that may require a hospital visit?  no    Discharge Instructions    \"Let's review your discharge instructions.  What is/are the follow-up recommendations?  Pt. Response: She is seeing Dr. Garcia tomorrow.  She has people coming out for therapy    \"Were you instructed to make a follow-up appointment?\"  Pt. Response: Yes.  Has appointment been made?   Yes      \"When you see the provider, I would recommend that you bring your discharge instructions with you.    Medications    \"How many new medications are you on since your hospitalization/ED visit?\"    2 or more - Epic MTM referral needed - acute meds - pain and antibiotic  \"How many of your current medicines changed (dose, timing, name, etc.) while you were in the hospital/ED visit?\"   0-1  \"Do you have questions about your medications?\"   No  \"Were you newly diagnosed with heart failure, COPD, diabetes or did you have a heart attack?\"   No  For patients on insulin: \"Did you start on insulin in the hospital or did you have your insulin dose changed?\"   No    Medication reconciliation completed? Yes    Was MTM referral placed (*Make sure to put transitions as reason for referral)?   No    Call Summary    \"Do you have any questions or concerns about your condition or care plan at the moment?\"    No  Triage nurse advice given: none    Patient was in ER twice in the past year (assess appropriateness of ER visits.)      \"If you have questions or things don't continue to improve, we encourage you contact us through the main clinic number,  [phone].  Even if the " "clinic is not open, triage nurses are available 24/7 to help you.     We would like you to know that our clinic has extended hours (provide information).  We also have urgent care (provide details on closest location and hours/contact info)\"      \"Thank you for your time and take care!\"        "

## 2018-09-13 ENCOUNTER — TELEPHONE (OUTPATIENT)
Dept: FAMILY MEDICINE | Facility: CLINIC | Age: 73
End: 2018-09-13

## 2018-09-13 ENCOUNTER — OFFICE VISIT (OUTPATIENT)
Dept: ORTHOPEDICS | Facility: CLINIC | Age: 73
End: 2018-09-13
Payer: COMMERCIAL

## 2018-09-13 DIAGNOSIS — I89.0 LYMPHEDEMA OF BOTH LOWER EXTREMITIES: ICD-10-CM

## 2018-09-13 DIAGNOSIS — Z47.89 ORTHOPEDIC AFTERCARE: Primary | ICD-10-CM

## 2018-09-13 PROCEDURE — 99024 POSTOP FOLLOW-UP VISIT: CPT | Performed by: PHYSICIAN ASSISTANT

## 2018-09-13 NOTE — TELEPHONE ENCOUNTER
"Called Cristy for confirmation that she is okay with us sending her current med list to this pharmacy and to see exactly which medications she needs refilled, what steps she has taken so far and how we can help.    No answer. LVM to return call.    Carol from [facility] calling ([phone]) looking for \"meds they are missing\". Patient attempting to transfer meds from [facility].    Patient notified them she would be out in a few days:  Senna-S, acetaminophen, ASA, dulcolax, iron sulfate, Miralax and gabapentin and hydromorphone.     Advised they contact [facility]. They are in agreement.  From [facility] she received lisinopril and duloxetine but they are not on patient's current med list.     Carol is requesting current med list: fax to [phone] Attn: Carol.    Keena CHAPMAN, Triage RN      "

## 2018-09-13 NOTE — PATIENT INSTRUCTIONS
Daily dressing changes, keep clean and dry.    May wash with mild soap and water, no scrub, pat dry and re dress.    Ambulation as much as possible.    Follow up in 1 week.

## 2018-09-13 NOTE — LETTER
9/13/2018         RE: Cristy Bailey  [address]  [place] [address]        Dear Colleague,    Thank you for referring your patient, Cristy Bailey, to the [facility] ORTHOPEDIC SURGERY. Please see a copy of my visit note below.    HISTORY OF PRESENT ILLNESS:    Cristy Bailey is a 73 year old female who is seen in follow up for Right hip I & D 8/26/18, Right JENAE, DOS 7/30/2018, Dr. Garcia..  Present symptoms: pt presents in wheel chair.  Tearful through out visit.  Taking Keflex 500 mg 4x daily, dilaudid 2 mg average 1 x daily, gabapentin.  Sleeping ok.  Minimal walking, ie transfers with assistance only. Mainly sits all day.  Home with home care PT/OT.  Main complaint is pain at bilateral lower legs.  Is able to touch them now but they hurt and limit ambulation.  Denies Chest pain, Calve pain, Fever, Chills.    Current Treatment: posstop, compression socks, dilaudid, gabapentin.    PHYSICAL EXAM:  There were no vitals taken for this visit.  There is no height or weight on file to calculate BMI.   GENERAL APPEARANCE: healthy, alert and no distress   PSYCH:  mentation appears normal and affect normal/bright    MSK:  Right: Hip. .  Ambulates: does not leave chair, needs assistance..  Incision with serosanguinous 1 ml at distal incision, some appreciable dehisc if staples not there, staples present, otherwise healing.  Appropriate incisional erythema.   No Ecchymosis.  No calve pain on palpation.  Edema bilateral lower leg edema, skin tension.  CMS: joesph incisional numbness, otherwise grossly intact.      IMAGING INTERPRETATION:  None today.     ASSESSMENT:  Cristy Bailey is a 73 year old female S/P Right hip I & D 8/26/18, Right JENAE, DOS 7/30/2018, Dr. Garcia..    Healing incision.  No overt signs of infection.    PLAN:  - Surgery discussed, images reviewed if applicable, and all questions were answered at this time.  - upper staples removed with sterile technique, steri-strips applied  in usual fashion, care instructions given and verbally acknowledged.  - Medications: continue as current.  - Physical therapy: continue with current physical therapy  - Encouraged ambulation.  - daily dresssing changes, mild soap and water shower, no scrub or soak.    Return to clinic 1, week.    Donal Grover PA-C    Dept. Orthopedic Surgery  [facility]   9/13/2018        Again, thank you for allowing me to participate in the care of your patient.        Sincerely,        Donal Grover PA-C

## 2018-09-13 NOTE — PROGRESS NOTES
HISTORY OF PRESENT ILLNESS:    Cristy Bailey is a 73 year old female who is seen in follow up for Right hip I & D 8/26/18, Right JENAE, DOS 7/30/2018, Dr. Garcia..  Present symptoms: pt presents in wheel chair.  Tearful through out visit.  Taking Keflex 500 mg 4x daily, dilaudid 2 mg average 1 x daily, gabapentin.  Sleeping ok.  Minimal walking, ie transfers with assistance only. Mainly sits all day.  Home with home care PT/OT.  Main complaint is pain at bilateral lower legs.  Is able to touch them now but they hurt and limit ambulation.  Denies Chest pain, Calve pain, Fever, Chills.    Current Treatment: posstop, compression socks, dilaudid, gabapentin.    PHYSICAL EXAM:  There were no vitals taken for this visit.  There is no height or weight on file to calculate BMI.   GENERAL APPEARANCE: healthy, alert and no distress   PSYCH:  mentation appears normal and affect normal/bright    MSK:  Right: Hip. .  Ambulates: does not leave chair, needs assistance..  Incision with serosanguinous 1 ml at distal incision, some appreciable dehisc if staples not there, staples present, otherwise healing.  Appropriate incisional erythema.   No Ecchymosis.  No calve pain on palpation.  Edema bilateral lower leg edema, skin tension.  CMS: joesph incisional numbness, otherwise grossly intact.      IMAGING INTERPRETATION:  None today.     ASSESSMENT:  Cristy Bailey is a 73 year old female S/P Right hip I & D 8/26/18, Right JENAE, DOS 7/30/2018, Dr. Garcia..    Healing incision.  No overt signs of infection.    PLAN:  - Surgery discussed, images reviewed if applicable, and all questions were answered at this time.  - upper staples removed with sterile technique, steri-strips applied in usual fashion, care instructions given and verbally acknowledged.  - Medications: continue as current.  - Physical therapy: continue with current physical therapy  - Encouraged ambulation.  - daily dresssing changes, mild soap and water shower, no scrub or  soak.  - Lymphedema referral.    Return to clinic 1, week.    Donal Grover PA-C    Dept. Orthopedic Surgery  [facility]   9/13/2018

## 2018-09-13 NOTE — MR AVS SNAPSHOT
After Visit Summary   9/13/2018    Cristy Bailey    MRN: [id number]           Patient Information     Date Of Birth          1945        Visit Information        Provider Department      9/13/2018 10:20 AM Donal Grover PA-C [facility] ORTHOPEDIC SURGERY        Today's Diagnoses     Orthopedic aftercare    -  1      Care Instructions    Daily dressing changes, keep clean and dry.    May wash with mild soap and water, no scrub, pat dry and re dress.    Ambulation as much as possible.    Follow up in 1 week.          Follow-ups after your visit        Your next 10 appointments already scheduled     Sep 20, 2018 10:20 AM CDT   Return Visit with Donal Grover PA-C   [facility] ORTHOPEDIC SURGERY ([facility])    [address] [address]   [phone]              Who to contact     If you have questions or need follow up information about today's clinic visit or your schedule please contact [facility] ORTHOPEDIC SURGERY directly at [phone].  Normal or non-critical lab and imaging results will be communicated to you by [employer], letter or phone within 4 business days after the clinic has received the results. If you do not hear from us within 7 days, please contact the clinic through [employer] or phone. If you have a critical or abnormal lab result, we will notify you by phone as soon as possible.  Submit refill requests through [employer] or call your pharmacy and they will forward the refill request to us. Please allow 3 business days for your refill to be completed.          Additional Information About Your Visit        MyChart Information     [employer] gives you secure access to your electronic health record. If you see a primary care provider, you can also send messages to your care team and make appointments. If you have questions, please call your primary care clinic.  If you do not have a primary care provider,  please call [phone] and they will assist you.        Care EveryWhere ID     This is your Care EveryWhere ID. This could be used by other organizations to access your [place] medical records  [id number]         Blood Pressure from Last 3 Encounters:   09/03/18 117/50   08/02/18 99/43   07/28/18 113/64    Weight from Last 3 Encounters:   07/28/18 250 lb (113.4 kg)   07/26/18 255 lb (115.7 kg)   06/29/18 255 lb (115.7 kg)              Today, you had the following     No orders found for display         Today's Medication Changes          These changes are accurate as of 9/13/18 10:41 AM.  If you have any questions, ask your nurse or doctor.               These medicines have changed or have updated prescriptions.        Dose/Directions    senna-docusate 8.6-50 MG per tablet   Commonly known as:  SENOKOT-S;PERICOLACE   This may have changed:  when to take this   Used for:  Constipation due to pain medication        Dose:  1 tablet   Take 1 tablet by mouth 2 times daily as needed for constipation   Quantity:  100 tablet   Refills:  0                Primary Care Provider Office Phone # Fax #    Manoj Robert Daley PA-C [phone]       [address] [address]        Goals        General    Pain Management (pt-stated)     Notes - Note created  4/5/2018  2:53 PM by Francisca Hernandez LSW    Goal Statement: I will have less pain.  Measure of Success: Will have intervention that addresses my pain.   Supportive Steps to Achieve: CC will contact PCP to discuss patient concerns and next steps.   Barriers: Cannot leave the house without extreme effort. Is having extreme pain and does not have any sense that it will get better. Her  is her caregiver and he is frustrated with her pain.  Strengths: Has  who supports her.    Date to Achieve By: 9-1-2018        Equal Access to Services     MICHELLE WISE AH: jeyson Ward, larry ferro  nando crabtreeyamilet bustamante'aan ah. So [facility] [phone].    ATENCIÓN: Si wilber de guzman, tiene a pompa disposición servicios gratuitos de asistencia lingüística. Adrienne al [phone].    We comply with applicable federal civil rights laws and [place] laws. We do not discriminate on the basis of race, color, national origin, age, disability, sex, sexual orientation, or gender identity.            Thank you!     Thank you for choosing [facility] ORTHOPEDIC SURGERY  for your care. Our goal is always to provide you with excellent care. Hearing back from our patients is one way we can continue to improve our services. Please take a few minutes to complete the written survey that you may receive in the mail after your visit with us. Thank you!             Your Updated Medication List - Protect others around you: Learn how to safely use, store and throw away your medicines at www.disposemymeds.org.          This list is accurate as of 9/13/18 10:41 AM.  Always use your most recent med list.                   Brand Name Dispense Instructions for use Diagnosis    acetaminophen 325 MG tablet    TYLENOL    100 tablet    Take 3 tablets (975 mg) by mouth every 8 hours    S/P hip replacement, right       aspirin 325 MG tablet     45 tablet    Take 1 tablet (325 mg) by mouth daily    Venous thromboembolism (VTE) prophylaxis prescribed at discharge       BACTRIM DS PO      Take 1 tablet by mouth 2 times daily        bisacodyl 10 MG Suppository    DULCOLAX    25 suppository    Place 1 suppository (10 mg) rectally daily as needed for constipation    Constipation due to pain medication       cephALEXin 500 MG capsule    KEFLEX    56 capsule    Take 1 capsule (500 mg) by mouth 4 times daily    S/P hip replacement, right       ferrous sulfate 325 (65 Fe) MG tablet    IRON    100 tablet    Take 1 tablet (325 mg) by mouth 2 times daily    Other iron deficiency anemia       gabapentin 300 MG capsule    NEURONTIN    180 capsule     Take 1 capsule (300 mg) by mouth 2 times daily    Weakness of both lower extremities, Bilateral leg pain       HYDROmorphone 2 MG tablet    DILAUDID    60 tablet    Take 1-2 tablets (2-4 mg) by mouth every 4 hours as needed for other (pain control or improvement in physical function. Hold dose for analgesic side effects.)    S/P hip replacement, right       polyethylene glycol Packet    MIRALAX/GLYCOLAX    7 packet    Take 17 g by mouth 2 times daily as needed (constipation)    Constipation due to pain medication       senna-docusate 8.6-50 MG per tablet    SENOKOT-S;PERICOLACE    100 tablet    Take 1 tablet by mouth 2 times daily as needed for constipation    Constipation due to pain medication

## 2018-09-14 RX ORDER — HYDROMORPHONE HYDROCHLORIDE 2 MG/1
2-4 TABLET ORAL EVERY 4 HOURS PRN
Qty: 60 TABLET | Refills: 0 | Status: CANCELLED | OUTPATIENT
Start: 2018-09-14

## 2018-09-14 RX ORDER — AMOXICILLIN 250 MG
1 CAPSULE ORAL 2 TIMES DAILY PRN
Qty: 100 TABLET | Status: CANCELLED | OUTPATIENT
Start: 2018-09-14

## 2018-09-14 RX ORDER — POLYETHYLENE GLYCOL 3350 17 G/17G
17 POWDER, FOR SOLUTION ORAL 2 TIMES DAILY PRN
Status: CANCELLED | OUTPATIENT
Start: 2018-09-14

## 2018-09-14 RX ORDER — BISACODYL 10 MG
10 SUPPOSITORY, RECTAL RECTAL DAILY PRN
Qty: 25 SUPPOSITORY | Refills: 1 | Status: CANCELLED | OUTPATIENT
Start: 2018-09-14

## 2018-09-14 RX ORDER — ASPIRIN 325 MG
325 TABLET ORAL DAILY
Qty: 45 TABLET | Refills: 0 | Status: CANCELLED | OUTPATIENT
Start: 2018-09-14

## 2018-09-14 RX ORDER — ACETAMINOPHEN 325 MG/1
975 TABLET ORAL EVERY 8 HOURS
Status: CANCELLED | OUTPATIENT
Start: 2018-09-14

## 2018-09-14 RX ORDER — FERROUS SULFATE 325(65) MG
325 TABLET ORAL 2 TIMES DAILY
Qty: 100 TABLET | Status: CANCELLED | OUTPATIENT
Start: 2018-09-14

## 2018-09-14 RX ORDER — GABAPENTIN 300 MG/1
300 CAPSULE ORAL 2 TIMES DAILY
Qty: 180 CAPSULE | Refills: 0 | Status: CANCELLED | OUTPATIENT
Start: 2018-09-14

## 2018-09-14 NOTE — TELEPHONE ENCOUNTER
Can we discuss with the patient if she is requesting all of these medications? I have never prescribed these before, other than the gabapentin. She likely does not need all of these and usually they are for the short time period following surgery.

## 2018-09-14 NOTE — TELEPHONE ENCOUNTER
Spoke with patient, she only needs a refill of the hydromorphone (has 10 tabs left). She said does not need ASAP but will likely need sometime next week. Please call patient when ready to  paper copy at the , significant other (Leo) will  for her.  -Elin Feliz

## 2018-09-17 ENCOUNTER — TELEPHONE (OUTPATIENT)
Dept: ORTHOPEDICS | Facility: CLINIC | Age: 73
End: 2018-09-17

## 2018-09-17 DIAGNOSIS — I89.0 LYMPHEDEMA OF BOTH LOWER EXTREMITIES: Primary | ICD-10-CM

## 2018-09-17 NOTE — TELEPHONE ENCOUNTER
Request completed by David Grover PA-C.   Form was faxed to [facility].     Keira Santiago ATC

## 2018-09-17 NOTE — TELEPHONE ENCOUNTER
Mariah, Nurse , calls from [facility].   Patient saw David Grover PA-C last week and had most of staples removed. Will get the rest of the staples removed this week.   They need to know how frequently dressing should be changed. Medicare will not cover daily dressing changes by a nurse, so they will need to teach her significant other how to do it.     Per office visit note of 9/13/18:   daily dresssing changes, mild soap and water shower, no scrub or soak    Phone call to Mariah and informed of the above.   She verbalized understanding.     DIEGO Javed RN

## 2018-09-17 NOTE — TELEPHONE ENCOUNTER
"Medication Request for:  2\" Medipore Tape: 1 roll per month,  and 5\" x 9\" ABD pad- Sterile Dressin dressings per month    Please advise on frequency of change of dressings.  Sign form to be faxed to [facility] at  (f) [phone]            Form was placed in [employer] Folder for David Grover PA-C to review.    "

## 2018-09-18 ENCOUNTER — MYC MEDICAL ADVICE (OUTPATIENT)
Dept: ORTHOPEDICS | Facility: CLINIC | Age: 73
End: 2018-09-18

## 2018-09-19 ENCOUNTER — TELEPHONE (OUTPATIENT)
Dept: FAMILY MEDICINE | Facility: CLINIC | Age: 73
End: 2018-09-19

## 2018-09-19 NOTE — TELEPHONE ENCOUNTER
Mariah,  for [employer], calling that there is no one in the company who will fit and do first application for lymphedema wraps.   They are able to change them in the home if they have specific instructions.  Please call for clarification at

## 2018-09-19 NOTE — TELEPHONE ENCOUNTER
I have nothing further to offer this patient in the ways of getting her wraps.  She either needs to go to the intial appointment in clinic as was previously ordered,  or find a home care lymphedema service, or if triage is willing to do this for her.  I sent a referral for her to be evaluated and treated as I have not the expertise to treat and cannot answer the questions asked by Cristy.    Donal Grover PA-C  [facility] - Surgery

## 2018-09-19 NOTE — TELEPHONE ENCOUNTER
Received a call from Elin of [facility] [phone].    Pt has had anxiety.  She has not been seen for this as a problem before. Not seeing this on her problem list.  Advised she should be seen    She followed up with ortho and they want her use lymph edema wraps.  She can apply them, but she is not sure where to get them.  Advised to call the ortho people.    Advised she should be seen because we have not seen her since she was in the hospital.

## 2018-09-20 ENCOUNTER — OFFICE VISIT (OUTPATIENT)
Dept: ORTHOPEDICS | Facility: CLINIC | Age: 73
End: 2018-09-20
Payer: COMMERCIAL

## 2018-09-20 DIAGNOSIS — Z96.641 S/P HIP REPLACEMENT, RIGHT: ICD-10-CM

## 2018-09-20 DIAGNOSIS — Z47.89 ORTHOPEDIC AFTERCARE: Primary | ICD-10-CM

## 2018-09-20 PROCEDURE — 99024 POSTOP FOLLOW-UP VISIT: CPT | Performed by: PHYSICIAN ASSISTANT

## 2018-09-20 RX ORDER — HYDROMORPHONE HYDROCHLORIDE 2 MG/1
2-4 TABLET ORAL EVERY 4 HOURS PRN
Qty: 45 TABLET | Refills: 0 | Status: SHIPPED | OUTPATIENT
Start: 2018-09-20 | End: 2018-10-04

## 2018-09-20 NOTE — PROGRESS NOTES
HISTORY OF PRESENT ILLNESS:    Cristy Bailey is a 73 year old female who is seen in follow up for Right hip I & D 8/26/18, Right JENAE, DOS 7/30/2018, Dr. Garcia.  Present symptoms: Pt reports inability to walk much with walker.  Can stand easily.  No pain at hips now, just at legs.  Dilaudid and gabapentin do not seem to help.  Has not been to lymphedema clinic as wanted home care and unable to provide through [facility]. Is Taking abx.  No new injuries.    Denies Chest pain, Calve pain, Fever, Chills.    Current Treatment: postop, home wrapping of legs, Dilaudid, tylenol, keflex, walker.    PHYSICAL EXAM:  There were no vitals taken for this visit.  There is no height or weight on file to calculate BMI.   GENERAL APPEARANCE: healthy, alert and no distress   PSYCH:  mentation appears normal and affect normal/bright    MSK:  Right: Hip .  Ambulates: presents in WC.  Incision with some serosanguinous on dressing from yesterday but not saturated, staples present at distal, otherwise healing.  No incisional erythema.   No Ecchymosis.  No calve pain on palpation.  Edema moderate foot through knee.  CMS: joesph incisional numbness, otherwise grossly intact.      IMAGING INTERPRETATION:  None today..       ASSESSMENT:  Cristy Bailey is a 73 year old female S/P Right hip I & D 8/26/18, Right JENAE, DOS 7/30/2018, Dr. Garcia.    Pain and anxiety sensitive.  Lymphedema.  Deconditioned.    PLAN:  - Surgery discussed, images reviewed if applicable, and all questions were answered at this time.  - remaining staples removed with sterile technique, dressing applied.  -  care instructions given and verbally acknowledged.  - Medications: increase gabapentin to 3x daily, refill dilaudid.  - Physical therapy: continue with current home physical therapy  - Pt agrees to go to lymphedema appt, order provided with  num to call.     Return to clinic 2, weeks    Donal Grover PA-C    Dept. Orthopedic Surgery  [facility]    9/20/2018

## 2018-09-20 NOTE — PATIENT INSTRUCTIONS
Continue to cover incision with clean dressings.    Gabapentin/Neurontin :  Take 1 tablet 3 times daily.      Showering ok.        Follow up in 2 weeks.

## 2018-09-20 NOTE — MR AVS SNAPSHOT
After Visit Summary   9/20/2018    Cristy Bailey    MRN: [id number]           Patient Information     Date Of Birth          1945        Visit Information        Provider Department      9/20/2018 10:20 AM Donal Grover PA-C [facility] ORTHOPEDIC SURGERY        Today's Diagnoses     Orthopedic aftercare    -  1    S/P hip replacement, right          Care Instructions    Continue to cover incision with clean dressings.    Gabapentin/Neurontin :  Take 1 tablet 3 times daily.      Showering ok.        Follow up in 2 weeks.           Follow-ups after your visit        Follow-up notes from your care team     Return in about 2 weeks (around 10/4/2018).      Your next 10 appointments already scheduled     Sep 21, 2018 11:20 AM CDT   MyChart Long with Manoj Daley PA-C   [facility]    [id number] [place] [id number] [phone]              Who to contact     If you have questions or need follow up information about today's clinic visit or your schedule please contact [facility] ORTHOPEDIC SURGERY directly at [phone].  Normal or non-critical lab and imaging results will be communicated to you by MyChart, letter or phone within 4 business days after the clinic has received the results. If you do not hear from us within 7 days, please contact the clinic through [email or web address] or phone. If you have a critical or abnormal lab result, we will notify you by phone as soon as possible.  Submit refill requests through [employer] or call your pharmacy and they will forward the refill request to us. Please allow 3 business days for your refill to be completed.          Additional Information About Your Visit        MyChart Information     [employer] gives you secure access to your electronic health record. If you see a primary care provider, you can also send messages to your care team and make appointments. If you have questions,  please call your primary care clinic.  If you do not have a primary care provider, please call [phone] and they will assist you.        Care EveryWhere ID     This is your Care EveryWhere ID. This could be used by other organizations to access your [place] medical records  [id number]         Blood Pressure from Last 3 Encounters:   09/03/18 117/50   08/02/18 99/43   07/28/18 113/64    Weight from Last 3 Encounters:   07/28/18 250 lb (113.4 kg)   07/26/18 255 lb (115.7 kg)   06/29/18 255 lb (115.7 kg)              Today, you had the following     No orders found for display         Today's Medication Changes          These changes are accurate as of 9/20/18 10:37 AM.  If you have any questions, ask your nurse or doctor.               These medicines have changed or have updated prescriptions.        Dose/Directions    HYDROmorphone 2 MG tablet   Commonly known as:  DILAUDID   This may have changed:  reasons to take this   Used for:  S/P hip replacement, right        Dose:  2-4 mg   Take 1-2 tablets (2-4 mg) by mouth every 4 hours as needed for moderate to severe pain   Quantity:  45 tablet   Refills:  0       senna-docusate 8.6-50 MG per tablet   Commonly known as:  SENOKOT-S;PERICOLACE   This may have changed:  when to take this   Used for:  Constipation due to pain medication        Dose:  1 tablet   Take 1 tablet by mouth 2 times daily as needed for constipation   Quantity:  100 tablet   Refills:  0            Where to get your medicines      Some of these will need a paper prescription and others can be bought over the counter.  Ask your nurse if you have questions.     Bring a paper prescription for each of these medications     HYDROmorphone 2 MG tablet               Information about OPIOIDS     PRESCRIPTION OPIOIDS: WHAT YOU NEED TO KNOW   We gave you an opioid (narcotic) pain medicine. It is important to manage your pain, but opioids are not always the best choice. You should first try all the other  options your care team gave you. Take this medicine for as short a time (and as few doses) as possible.    Some activities can increase your pain, such as bandage changes or therapy sessions. It may help to take your pain medicine 30 to 60 minutes before these activities. Reduce your stress by getting enough sleep, working on hobbies you enjoy and practicing relaxation or meditation. Talk to your care team about ways to manage your pain beyond prescription opioids.    These medicines have risks:    DO NOT drive when on new or higher doses of pain medicine. These medicines can affect your alertness and reaction times, and you could be arrested for driving under the influence (DUI). If you need to use opioids long-term, talk to your care team about driving.    DO NOT operate heavy machinery    DO NOT do any other dangerous activities while taking these medicines.    DO NOT drink any alcohol while taking these medicines.     If the opioid prescribed includes acetaminophen, DO NOT take with any other medicines that contain acetaminophen. Read all labels carefully. Look for the word  acetaminophen  or  Tylenol.  Ask your pharmacist if you have questions or are unsure.    You can get addicted to pain medicines, especially if you have a history of addiction (chemical, alcohol or substance dependence). Talk to your care team about ways to reduce this risk.    All opioids tend to cause constipation. Drink plenty of water and eat foods that have a lot of fiber, such as fruits, vegetables, prune juice, apple juice and high-fiber cereal. Take a laxative (Miralax, milk of magnesia, Colace, Senna) if you don t move your bowels at least every other day. Other side effects include upset stomach, sleepiness, dizziness, throwing up, tolerance (needing more of the medicine to have the same effect), physical dependence and slowed breathing.    Store your pills in a secure place, locked if possible. We will not replace any lost or  stolen medicine. If you don t finish your medicine, please throw away (dispose) as directed by your pharmacist. The [place] Pollution Control Agency has more information about safe disposal: https://www.pca.state.[place]/living-green/managing-unwanted-medications         Primary Care Provider Office Phone # Fax #    Manoj Robert Daley PA-C [phone]       [address] [place] [address]        Goals        General    Pain Management (pt-stated)     Notes - Note created  4/5/2018  2:53 PM by Francisca Hernandez LSW    Goal Statement: I will have less pain.  Measure of Success: Will have intervention that addresses my pain.   Supportive Steps to Achieve: CC will contact PCP to discuss patient concerns and next steps.   Barriers: Cannot leave the house without extreme effort. Is having extreme pain and does not have any sense that it will get better. Her  is her caregiver and he is frustrated with her pain.  Strengths: Has  who supports her.    Date to Achieve By: 9-1-2018        Equal Access to Services     MICHELLE WISE : Hadii aad ku hadasho Sosusie, waaxda luqadaha, qaybta kaalmada adeangelique, larry camp. So [facility] [phone].    ATENCIÓN: Si habla español, tiene a pompa disposición servicios gratuitos de asistencia lingüística. Llame al [phone].    We comply with applicable federal civil rights laws and [place] laws. We do not discriminate on the basis of race, color, national origin, age, disability, sex, sexual orientation, or gender identity.            Thank you!     Thank you for choosing [facility] ORTHOPEDIC SURGERY  for your care. Our goal is always to provide you with excellent care. Hearing back from our patients is one way we can continue to improve our services. Please take a few minutes to complete the written survey that you may receive in the mail after your visit with us. Thank you!             Your Updated Medication List -  Protect others around you: Learn how to safely use, store and throw away your medicines at www.disposemymeds.org.          This list is accurate as of 9/20/18 10:38 AM.  Always use your most recent med list.                   Brand Name Dispense Instructions for use Diagnosis    acetaminophen 325 MG tablet    TYLENOL    100 tablet    Take 3 tablets (975 mg) by mouth every 8 hours    S/P hip replacement, right       BACTRIM DS PO      Take 1 tablet by mouth 2 times daily        bisacodyl 10 MG Suppository    DULCOLAX    25 suppository    Place 1 suppository (10 mg) rectally daily as needed for constipation    Constipation due to pain medication       cephALEXin 500 MG capsule    KEFLEX    56 capsule    Take 1 capsule (500 mg) by mouth 4 times daily    S/P hip replacement, right       ferrous sulfate 325 (65 Fe) MG tablet    IRON    100 tablet    Take 1 tablet (325 mg) by mouth 2 times daily    Other iron deficiency anemia       gabapentin 300 MG capsule    NEURONTIN    180 capsule    Take 1 capsule (300 mg) by mouth 2 times daily    Weakness of both lower extremities, Bilateral leg pain       HYDROmorphone 2 MG tablet    DILAUDID    45 tablet    Take 1-2 tablets (2-4 mg) by mouth every 4 hours as needed for moderate to severe pain    S/P hip replacement, right       polyethylene glycol Packet    MIRALAX/GLYCOLAX    7 packet    Take 17 g by mouth 2 times daily as needed (constipation)    Constipation due to pain medication       senna-docusate 8.6-50 MG per tablet    SENOKOT-S;PERICOLACE    100 tablet    Take 1 tablet by mouth 2 times daily as needed for constipation    Constipation due to pain medication

## 2018-09-21 ENCOUNTER — OFFICE VISIT (OUTPATIENT)
Dept: FAMILY MEDICINE | Facility: CLINIC | Age: 73
End: 2018-09-21
Payer: COMMERCIAL

## 2018-09-21 VITALS
DIASTOLIC BLOOD PRESSURE: 80 MMHG | SYSTOLIC BLOOD PRESSURE: 123 MMHG | RESPIRATION RATE: 14 BRPM | TEMPERATURE: 97.3 F | OXYGEN SATURATION: 96 % | HEART RATE: 63 BPM

## 2018-09-21 DIAGNOSIS — K59.03 DRUG-INDUCED CONSTIPATION: ICD-10-CM

## 2018-09-21 DIAGNOSIS — M79.605 BILATERAL LEG PAIN: ICD-10-CM

## 2018-09-21 DIAGNOSIS — Z96.641 S/P HIP REPLACEMENT, RIGHT: Primary | ICD-10-CM

## 2018-09-21 DIAGNOSIS — F43.23 ADJUSTMENT DISORDER WITH MIXED ANXIETY AND DEPRESSED MOOD: ICD-10-CM

## 2018-09-21 DIAGNOSIS — I10 HTN, GOAL BELOW 140/90: ICD-10-CM

## 2018-09-21 DIAGNOSIS — M79.604 BILATERAL LEG PAIN: ICD-10-CM

## 2018-09-21 PROCEDURE — 99214 OFFICE O/P EST MOD 30 MIN: CPT | Performed by: PHYSICIAN ASSISTANT

## 2018-09-21 RX ORDER — AMOXICILLIN 250 MG
1 CAPSULE ORAL 2 TIMES DAILY PRN
Qty: 60 TABLET | Refills: 1 | Status: SHIPPED | OUTPATIENT
Start: 2018-09-21 | End: 2018-10-05

## 2018-09-21 RX ORDER — CITALOPRAM HYDROBROMIDE 10 MG/1
10 TABLET ORAL DAILY
Qty: 90 TABLET | Refills: 0 | Status: SHIPPED | OUTPATIENT
Start: 2018-09-21 | End: 2018-12-17

## 2018-09-21 NOTE — MR AVS SNAPSHOT
After Visit Summary   9/21/2018    Cristy Bailey    MRN: [id number]           Patient Information     Date Of Birth          1945        Visit Information        Provider Department      9/21/2018 11:20 AM Manoj Daley PA-C [facility] [place]        Today's Diagnoses     S/P hip replacement, right    -  1    Bilateral leg pain        Drug-induced constipation        HTN, goal below 140/90        Adjustment disorder with mixed anxiety and depressed mood          Care Instructions    Let's consider the flu and shingles vaccine soon!          Follow-ups after your visit        Your next 10 appointments already scheduled     Oct 04, 2018 10:40 AM CDT   Return Visit with Donal Grover PA-C   [facility] ORTHOPEDIC SURGERY ([facility])    [address] [facility]  [address] [place] [address]   [phone]              Who to contact     If you have questions or need follow up information about today's clinic visit or your schedule please contact [facility] directly at [phone].  Normal or non-critical lab and imaging results will be communicated to you by [email or web address], letter or phone within 4 business days after the clinic has received the results. If you do not hear from us within 7 days, please contact the clinic through [email or web address] or phone. If you have a critical or abnormal lab result, we will notify you by phone as soon as possible.  Submit refill requests through [employer] or call your pharmacy and they will forward the refill request to us. Please allow 3 business days for your refill to be completed.          Additional Information About Your Visit        [email or web address] Information     [employer] gives you secure access to your electronic health record. If you see a primary care provider, you can also send messages to your care team and make appointments. If you have questions, please call your primary care clinic.  If you do not have a  primary care provider, please call [phone] and they will assist you.        Care EveryWhere ID     This is your Care EveryWhere ID. This could be used by other organizations to access your [facility] medical records  [id number]        Your Vitals Were     Pulse Temperature Respirations Pulse Oximetry          63 97.3  F (36.3  C) (Tympanic) 14 96%         Blood Pressure from Last 3 Encounters:   09/21/18 123/80   09/03/18 117/50   08/02/18 99/43    Weight from Last 3 Encounters:   07/28/18 250 lb (113.4 kg)   07/26/18 255 lb (115.7 kg)   06/29/18 255 lb (115.7 kg)              Today, you had the following     No orders found for display         Today's Medication Changes          These changes are accurate as of 9/21/18 12:23 PM.  If you have any questions, ask your nurse or doctor.               Start taking these medicines.        Dose/Directions    citalopram 10 MG tablet   Commonly known as:  celeXA   Used for:  Adjustment disorder with mixed anxiety and depressed mood   Started by:  Manoj Daley PA-C        Dose:  10 mg   Take 1 tablet (10 mg) by mouth daily   Quantity:  90 tablet   Refills:  0            Where to get your medicines      These medications were sent to [facility] - [address]  [address] [place] [address]     Phone:  [phone]     citalopram 10 MG tablet    senna-docusate 8.6-50 MG per tablet                Primary Care Provider Office Phone # Fax #    Manoj Daley PA-C [phone]       [address] [facility] [place] [address]        Goals        General    Pain Management (pt-stated)     Notes - Note created  4/5/2018  2:53 PM by Francisca Hernandez LSW    Goal Statement: I will have less pain.  Measure of Success: Will have intervention that addresses my pain.   Supportive Steps to Achieve: CC will contact PCP to discuss patient concerns and next steps.   Barriers: Cannot leave the house without extreme  effort. Is having extreme pain and does not have any sense that it will get better. Her  is her caregiver and he is frustrated with her pain.  Strengths: Has  who supports her.    Date to Achieve By: 9-1-2018        Equal Access to Services     MICHELLE WISE : Hadii aad ku hadbebetodaysi Mario, waadrianda luqadaha, qaybta kaalmada andrea, larry julioin hayaasegun garciamarilynbenjamin camp. So [facility] [phone].    ATENCIÓN: Si habla español, tiene a pompa disposición servicios gratuitos de asistencia lingüística. Llame al [phone].    We comply with applicable federal civil rights laws and [place] laws. We do not discriminate on the basis of race, color, national origin, age, disability, sex, sexual orientation, or gender identity.            Thank you!     Thank you for choosing [facility]  for your care. Our goal is always to provide you with excellent care. Hearing back from our patients is one way we can continue to improve our services. Please take a few minutes to complete the written survey that you may receive in the mail after your visit with us. Thank you!             Your Updated Medication List - Protect others around you: Learn how to safely use, store and throw away your medicines at www.disposemymeds.org.          This list is accurate as of 9/21/18 12:23 PM.  Always use your most recent med list.                   Brand Name Dispense Instructions for use Diagnosis    acetaminophen 325 MG tablet    TYLENOL    100 tablet    Take 3 tablets (975 mg) by mouth every 8 hours    S/P hip replacement, right       BACTRIM DS PO      Take 1 tablet by mouth 2 times daily        bisacodyl 10 MG Suppository    DULCOLAX    25 suppository    Place 1 suppository (10 mg) rectally daily as needed for constipation    Constipation due to pain medication       cephALEXin 500 MG capsule    KEFLEX    56 capsule    Take 1 capsule (500 mg) by mouth 4 times daily    S/P hip replacement, right       citalopram  10 MG tablet    celeXA    90 tablet    Take 1 tablet (10 mg) by mouth daily    Adjustment disorder with mixed anxiety and depressed mood       ferrous sulfate 325 (65 Fe) MG tablet    IRON    100 tablet    Take 1 tablet (325 mg) by mouth 2 times daily    Other iron deficiency anemia       gabapentin 300 MG capsule    NEURONTIN    180 capsule    Take 1 capsule (300 mg) by mouth 2 times daily    Weakness of both lower extremities, Bilateral leg pain       HYDROmorphone 2 MG tablet    DILAUDID    45 tablet    Take 1-2 tablets (2-4 mg) by mouth every 4 hours as needed for moderate to severe pain    S/P hip replacement, right       polyethylene glycol Packet    MIRALAX/GLYCOLAX    7 packet    Take 17 g by mouth 2 times daily as needed (constipation)    Constipation due to pain medication       senna-docusate 8.6-50 MG per tablet    SENOKOT-S;PERICOLACE    60 tablet    Take 1 tablet by mouth 2 times daily as needed for constipation    Drug-induced constipation

## 2018-09-24 ENCOUNTER — TELEPHONE (OUTPATIENT)
Dept: ORTHOPEDICS | Facility: CLINIC | Age: 73
End: 2018-09-24

## 2018-09-24 ENCOUNTER — TELEPHONE (OUTPATIENT)
Dept: FAMILY MEDICINE | Facility: CLINIC | Age: 73
End: 2018-09-24

## 2018-09-24 NOTE — TELEPHONE ENCOUNTER
Daily checks and changes. Do not want saturation of incision dressing.  Continue until drainage has stopped 48 hours.    Donal Grover PA-C  [facility] - Surgery

## 2018-09-24 NOTE — TELEPHONE ENCOUNTER
Received a call from Elin from [facility] [phone].    She called about the pts worsening anxiety, wondering what was done.  She was seen Friday.  Note not finished.  Asked if she asked the pt, she said the pt has short term memory loss and she could not remember.  She is on citalopram.      She is requesting an AVS from the visit or a note when it is done - please fax to [phone].

## 2018-09-24 NOTE — TELEPHONE ENCOUNTER
Received a voicemail from Mariah, nurse , asking for wound care orders.   Patient was seen 9/20/18 by David Grover PA-C and patient thought orders had changed but wasn't sure.   Please clarify if dressing changes are to be daily or 3x/wk.     Office visit note from 9/20/18 states: Continue to cover incision with clean dressings    She can be reached at : [phone]    Please advise.     DIEGO Javed RN

## 2018-09-25 NOTE — TELEPHONE ENCOUNTER
Left voicemail for Mariah at Home Health to inform her of David's recommendations in previous message for Wound Care/ Dressing Changes.   Mariah to call [facility]rell if she needs further clarification.     Keira Santiago, ATC

## 2018-10-03 ENCOUNTER — TELEPHONE (OUTPATIENT)
Dept: ORTHOPEDICS | Facility: CLINIC | Age: 73
End: 2018-10-03

## 2018-10-03 ENCOUNTER — TELEPHONE (OUTPATIENT)
Dept: FAMILY MEDICINE | Facility: CLINIC | Age: 73
End: 2018-10-03

## 2018-10-03 NOTE — TELEPHONE ENCOUNTER
Fax received from [facility] requesting orders for: medipore surgical tape, and sterile dressings for left hip, s/p left JENAE.   Patient has follow up appointment with David Grover PA-C, wound will be evaluated at that time.    Form will be faxed if orders are still appropriate after tomorrow's office visit.   Form was placed in [employer] folder on David's desk for review.    Keira Santiago ATC

## 2018-10-03 NOTE — TELEPHONE ENCOUNTER
Call back to Elin montez/ home health care [facility]. PH# [phone]. Went through med list with homecare RN.   Teri WHITE RN

## 2018-10-03 NOTE — TELEPHONE ENCOUNTER
Reason for Call:  Other call back    Detailed comments: Mariah home health nurse calling to speak with the nurse to reconcile patients medications.Please call her back at [phone].    Best Time: any    Can we leave a detailed message on this number? Not Applicable    Call taken on 10/3/2018 at 12:41 PM by Daisha Low

## 2018-10-03 NOTE — TELEPHONE ENCOUNTER
Patient has a follow up appointment with David Grover PA-C tomorrow. These requests can be discussed at tomorrow's appointment.     Keira Santiago ATC

## 2018-10-03 NOTE — TELEPHONE ENCOUNTER
Cristy Bailey is a 73 year old female whose home health care nurse, Elin from [facility], called with 2 requests.     Elin is requesting a prescription for dilaudid for the patient to be mailed or e-prescribed to [facility]  [address] [place] [address]  Phone: [phone]    Prescription last written on 9/20/2018 by David Grover PA-C  Sig: Take 1-2 tablets (2-4 mg) by mouth every 4 hours as needed for moderate to severe pain   Last Fill Quantity: 45 with # refills: 0      Elin also needs an order for specifics on the lymphedema wrapping (how high for the wrapping, including the toes, what material she should order for wrapping?) She states that the patient refuses to go to the lymphedema clinic as instructed at her last appointment.       Treatment plan from OV on 9/20/2018 recommended:  Medications: increase gabapentin to 3x daily, refill dilaudid.  - Physical therapy: continue with current home physical therapy  - Pt agrees to go to lymphedema appt, order provided with  pino to call.   Next OV scheduled? Yes -  next appointment is on: 10/4/2018      Tracie White M.Ed., ATR, ATC

## 2018-10-04 ENCOUNTER — PATIENT OUTREACH (OUTPATIENT)
Dept: CARE COORDINATION | Facility: CLINIC | Age: 73
End: 2018-10-04

## 2018-10-04 ENCOUNTER — TELEPHONE (OUTPATIENT)
Dept: ORTHOPEDICS | Facility: CLINIC | Age: 73
End: 2018-10-04

## 2018-10-04 ENCOUNTER — OFFICE VISIT (OUTPATIENT)
Dept: ORTHOPEDICS | Facility: CLINIC | Age: 73
End: 2018-10-04
Payer: COMMERCIAL

## 2018-10-04 DIAGNOSIS — I89.0 LYMPHEDEMA OF BOTH LOWER EXTREMITIES: Primary | ICD-10-CM

## 2018-10-04 DIAGNOSIS — Z47.89 ORTHOPEDIC AFTERCARE: Primary | ICD-10-CM

## 2018-10-04 DIAGNOSIS — Z96.641 S/P HIP REPLACEMENT, RIGHT: ICD-10-CM

## 2018-10-04 PROCEDURE — 99024 POSTOP FOLLOW-UP VISIT: CPT | Performed by: PHYSICIAN ASSISTANT

## 2018-10-04 RX ORDER — HYDROMORPHONE HYDROCHLORIDE 2 MG/1
2-4 TABLET ORAL EVERY 4 HOURS PRN
Qty: 45 TABLET | Refills: 0 | Status: SHIPPED | OUTPATIENT
Start: 2018-10-04 | End: 2018-10-18

## 2018-10-04 ASSESSMENT — ACTIVITIES OF DAILY LIVING (ADL): DEPENDENT_IADLS:: COOKING;CLEANING;LAUNDRY;SHOPPING;MEAL PREPARATION;TRANSPORTATION

## 2018-10-04 NOTE — TELEPHONE ENCOUNTER
Cristy Bailey is a 73 year old female who left a voicemail stating she saw David Grover PA-C today and needs paperwork faxed to lymphedema clinic.    Phone call to patient. She would like order faxed to [facility] for lymphedema care.  She was told they need something for a face to face certification.   She would like order faxed to :F: [phone]    P:[phone]  Asked that she call Home care back on Monday to set up if she had not heard back from them by then.   Also asked patient if she was able to get prescription to the pharmacy. She states she had someone drop it off at the pharmacy for her.   She verbalized understanding.     Phone call to [facility].   They will need new lymphedema order signed by Provider as they need to get patient scheduled within 24 hrs of receiving order.   He will check into their availability to see patient for this issue and get back with us.   Otherwise, they should be able to see order and office visit notes for face to face certification.     Awaiting call back.     DIEGO Javed, RN

## 2018-10-04 NOTE — TELEPHONE ENCOUNTER
Spoke with patient at visit.  States she does not remember the conversation about going to the edema referral.  Agrees to call and schedule today.    Donal Grover PA-C  [facility] - Surgery

## 2018-10-04 NOTE — PATIENT INSTRUCTIONS
Neurontn/ Gabapentin:  300 mg (1 tab) morning and mid day, and 600 mg (2 tabs) at night.      Use Dilaudid sparingly, or try to taper.    Tylenol 650 mg every 6 hours as needed.     Please call the edema clinic to schedule an appointment.    Follow up in 4 weeks.

## 2018-10-04 NOTE — PROGRESS NOTES
Clinic Care Coordination Contact    Situation: Patient chart reviewed by care coordinator.    Background: Patient is getting home care from [facility]. Did chart review. Patient continues to receive home care.      Assessment: No call needed as patient has not discharged from home care.    Plan/Recommendations: Do chart review in four weeks and call patient when discharged.    Francisca Hernandez   [facility]  [email or web address]  [phone]    Clinic Care Coordination Contact  Care Team Conversations  Email from Bry at [facility] who has been discussing with patient and PCP clinic about changing patient to [facility] as she needs lymphedema wraps and her current agency does not offer that service.  Bry asked what agency is she currently working with as patient cannot remember.  Sent him Mariah Gaspar, Nurse with [facility] as the CM.      Plan- he will contact Mariah to make sure they discharge her from their care.  CC to work with [facility] to assist as needed.      Social Lesley Hanna  [facility]  [email or web address]  [phone]

## 2018-10-04 NOTE — TELEPHONE ENCOUNTER
Phone call to Elin and informed patient has appointment tomorrow.   Informed that David Grover PA-C previously recommended to patient that she see lymphedema  Clinic or use a home care lymphedema service as it is not in his scope of practice to treat.   She states patient is not willing to go to the lymphedema clinic or switch home care services, which she would need to do in order for them to do both lymphedema care and nursing care they are providing now.     Informed will have David Grover PA-C address the lymphedema issue at appointment tomorrow again with patient.     She asks that prescription NOT be given to patient at appointment as it must be mailed to the pharmacy below as it is a MAIL Order Pharmacy.   She would like it mailed today if possible.     Please advise on prescription refill.     DIEGO Javed RN

## 2018-10-04 NOTE — PROGRESS NOTES
Clinic Care Coordination Contact    Situation: Patient chart reviewed by care coordinator.  Background: Patient continues to be open to home care.    Assessment: Is seeing PCP and orthopedics for OV.  Still open to home care.  CC has talked with RN with home care and she will call if needs arise.     Plan/Recommendations: No outreach to patient at this time. CC to do chart review in one month.     Francisca Hernandez,   [facility]  [email or web address]  [phone]

## 2018-10-04 NOTE — TELEPHONE ENCOUNTER
Received call back from [facility].   He states they are able to get patient set up but need to make sure she is no longer using the other home care company. He will contact patient to verify and call us back for orders if patient is in agreement.     DIEGO Javed RN

## 2018-10-04 NOTE — MR AVS SNAPSHOT
After Visit Summary   10/4/2018    Cristy Bailey    MRN: [id number]           Patient Information     Date Of Birth          1945        Visit Information        Provider Department      10/4/2018 10:40 AM Donal Grover PA-C [facility] ORTHOPEDIC SURGERY        Today's Diagnoses     S/P hip replacement, right          Care Instructions    Neurontn/ Gabapentin:  300 mg (1 tab) morning and mid day, and 600 mg (2 tabs) at night.      Use Dilaudid sparingly, or try to taper.    Tylenol 650 mg every 6 hours as needed.     Please call the edema clinic to schedule an appointment.    Follow up in 4 weeks.           Follow-ups after your visit        Follow-up notes from your care team     Return in about 4 weeks (around 11/1/2018).      Who to contact     If you have questions or need follow up information about today's clinic visit or your schedule please contact [facility] ORTHOPEDIC SURGERY directly at [phone].  Normal or non-critical lab and imaging results will be communicated to you by Gizmozhart, letter or phone within 4 business days after the clinic has received the results. If you do not hear from us within 7 days, please contact the clinic through S[facility] Sourcet or phone. If you have a critical or abnormal lab result, we will notify you by phone as soon as possible.  Submit refill requests through Digital H2O or call your pharmacy and they will forward the refill request to us. Please allow 3 business days for your refill to be completed.          Additional Information About Your Visit        Gizmozhart Information     Digital H2O gives you secure access to your electronic health record. If you see a primary care provider, you can also send messages to your care team and make appointments. If you have questions, please call your primary care clinic.  If you do not have a primary care provider, please call [phone] and they will assist you.        Care EveryWhere ID     This is your  Care EveryWhere ID. This could be used by other organizations to access your [place] medical records  [id number]         Blood Pressure from Last 3 Encounters:   09/21/18 123/80   09/03/18 117/50   08/02/18 99/43    Weight from Last 3 Encounters:   07/28/18 250 lb (113.4 kg)   07/26/18 255 lb (115.7 kg)   06/29/18 255 lb (115.7 kg)              Today, you had the following     No orders found for display         Where to get your medicines      Some of these will need a paper prescription and others can be bought over the counter.  Ask your nurse if you have questions.     Bring a paper prescription for each of these medications     HYDROmorphone 2 MG tablet         Information about OPIOIDS     PRESCRIPTION OPIOIDS: WHAT YOU NEED TO KNOW   We gave you an opioid (narcotic) pain medicine. It is important to manage your pain, but opioids are not always the best choice. You should first try all the other options your care team gave you. Take this medicine for as short a time (and as few doses) as possible.    Some activities can increase your pain, such as bandage changes or therapy sessions. It may help to take your pain medicine 30 to 60 minutes before these activities. Reduce your stress by getting enough sleep, working on hobbies you enjoy and practicing relaxation or meditation. Talk to your care team about ways to manage your pain beyond prescription opioids.    These medicines have risks:    DO NOT drive when on new or higher doses of pain medicine. These medicines can affect your alertness and reaction times, and you could be arrested for driving under the influence (DUI). If you need to use opioids long-term, talk to your care team about driving.    DO NOT operate heavy machinery    DO NOT do any other dangerous activities while taking these medicines.    DO NOT drink any alcohol while taking these medicines.     If the opioid prescribed includes acetaminophen, DO NOT take with any other medicines that  contain acetaminophen. Read all labels carefully. Look for the word  acetaminophen  or  Tylenol.  Ask your pharmacist if you have questions or are unsure.    You can get addicted to pain medicines, especially if you have a history of addiction (chemical, alcohol or substance dependence). Talk to your care team about ways to reduce this risk.    All opioids tend to cause constipation. Drink plenty of water and eat foods that have a lot of fiber, such as fruits, vegetables, prune juice, apple juice and high-fiber cereal. Take a laxative (Miralax, milk of magnesia, Colace, Senna) if you don t move your bowels at least every other day. Other side effects include upset stomach, sleepiness, dizziness, throwing up, tolerance (needing more of the medicine to have the same effect), physical dependence and slowed breathing.    Store your pills in a secure place, locked if possible. We will not replace any lost or stolen medicine. If you don t finish your medicine, please throw away (dispose) as directed by your pharmacist. The [place] Pollution Control Agency has more information about safe disposal: https://www.pca.[facility].mn.us/living-green/managing-unwanted-medications         Primary Care Provider Office Phone # Fax #    Manoj Daley PA-C [phone]       [address]  [facility] [address]        Goals        General    Pain Management (pt-stated)     Notes - Note created  4/5/2018  2:53 PM by Francisca Hernandez LSW    Goal Statement: I will have less pain.  Measure of Success: Will have intervention that addresses my pain.   Supportive Steps to Achieve: CC will contact PCP to discuss patient concerns and next steps.   Barriers: Cannot leave the house without extreme effort. Is having extreme pain and does not have any sense that it will get better. Her  is her caregiver and he is frustrated with her pain.  Strengths: Has  who supports her.    Date to Achieve By: 9-1-2018        Equal  Access to Services     : Hadii braden peace mariano Martin, waadrianda luqadaha, qaybta kaalpeewee dylonpriscilalarry butlermarilynbenjamin camp. So [facility] [phone].    ATENCIÓN: Si habla gab, tiene a pompa disposición servicios gratuitos de asistencia lingüística. Llame al [phone].    We comply with applicable federal civil rights laws and [place] laws. We do not discriminate on the basis of race, color, national origin, age, disability, sex, sexual orientation, or gender identity.            Thank you!     Thank you for choosing [facility]  for your care. Our goal is always to provide you with excellent care. Hearing back from our patients is one way we can continue to improve our services. Please take a few minutes to complete the written survey that you may receive in the mail after your visit with us. Thank you!             Your Updated Medication List - Protect others around you: Learn how to safely use, store and throw away your medicines at www.disposemymeds.org.          This list is accurate as of 10/4/18 11:22 AM.  Always use your most recent med list.                   Brand Name Dispense Instructions for use Diagnosis    acetaminophen 325 MG tablet    TYLENOL    100 tablet    Take 3 tablets (975 mg) by mouth every 8 hours    S/P hip replacement, right       BACTRIM DS PO      Take 1 tablet by mouth 2 times daily        bisacodyl 10 MG Suppository    DULCOLAX    25 suppository    Place 1 suppository (10 mg) rectally daily as needed for constipation    Constipation due to pain medication       cephALEXin 500 MG capsule    KEFLEX    56 capsule    Take 1 capsule (500 mg) by mouth 4 times daily    S/P hip replacement, right       citalopram 10 MG tablet    celeXA    90 tablet    Take 1 tablet (10 mg) by mouth daily    Adjustment disorder with mixed anxiety and depressed mood       ferrous sulfate 325 (65 Fe) MG tablet    IRON    100 tablet    Take 1 tablet (325 mg)  by mouth 2 times daily    Other iron deficiency anemia       gabapentin 300 MG capsule    NEURONTIN    180 capsule    Take 1 capsule (300 mg) by mouth 2 times daily    Weakness of both lower extremities, Bilateral leg pain       HYDROmorphone 2 MG tablet    DILAUDID    45 tablet    Take 1-2 tablets (2-4 mg) by mouth every 4 hours as needed for moderate to severe pain    S/P hip replacement, right       polyethylene glycol Packet    MIRALAX/GLYCOLAX    7 packet    Take 17 g by mouth 2 times daily as needed (constipation)    Constipation due to pain medication       senna-docusate 8.6-50 MG per tablet    SENOKOT-S;PERICOLACE    60 tablet    Take 1 tablet by mouth 2 times daily as needed for constipation    Drug-induced constipation

## 2018-10-05 ENCOUNTER — TELEPHONE (OUTPATIENT)
Dept: FAMILY MEDICINE | Facility: CLINIC | Age: 73
End: 2018-10-05

## 2018-10-05 DIAGNOSIS — R29.898 WEAKNESS OF BOTH LOWER EXTREMITIES: ICD-10-CM

## 2018-10-05 DIAGNOSIS — D50.8 OTHER IRON DEFICIENCY ANEMIA: ICD-10-CM

## 2018-10-05 DIAGNOSIS — K59.03 CONSTIPATION DUE TO PAIN MEDICATION: ICD-10-CM

## 2018-10-05 DIAGNOSIS — M79.604 BILATERAL LEG PAIN: ICD-10-CM

## 2018-10-05 DIAGNOSIS — Z96.641 S/P HIP REPLACEMENT, RIGHT: ICD-10-CM

## 2018-10-05 DIAGNOSIS — K59.03 DRUG-INDUCED CONSTIPATION: ICD-10-CM

## 2018-10-05 DIAGNOSIS — M79.605 BILATERAL LEG PAIN: ICD-10-CM

## 2018-10-05 DIAGNOSIS — F43.23 ADJUSTMENT DISORDER WITH MIXED ANXIETY AND DEPRESSED MOOD: ICD-10-CM

## 2018-10-05 RX ORDER — CITALOPRAM HYDROBROMIDE 10 MG/1
10 TABLET ORAL DAILY
Qty: 90 TABLET | Refills: 0 | OUTPATIENT
Start: 2018-10-05

## 2018-10-05 RX ORDER — ACETAMINOPHEN 325 MG/1
975 TABLET ORAL EVERY 8 HOURS
Qty: 100 TABLET | Refills: 2 | Status: SHIPPED | OUTPATIENT
Start: 2018-10-05 | End: 2019-08-30

## 2018-10-05 RX ORDER — FERROUS SULFATE 325(65) MG
325 TABLET ORAL 2 TIMES DAILY
Qty: 180 TABLET | Refills: 0 | Status: SHIPPED | OUTPATIENT
Start: 2018-10-05 | End: 2019-01-22

## 2018-10-05 RX ORDER — AMOXICILLIN 250 MG
1 CAPSULE ORAL 2 TIMES DAILY PRN
Qty: 60 TABLET | Refills: 2 | Status: SHIPPED | OUTPATIENT
Start: 2018-10-05 | End: 2019-01-22

## 2018-10-05 RX ORDER — POLYETHYLENE GLYCOL 3350 17 G/17G
17 POWDER, FOR SOLUTION ORAL 2 TIMES DAILY PRN
Qty: 200 PACKET | Refills: 0 | Status: SHIPPED | OUTPATIENT
Start: 2018-10-05 | End: 2019-01-22

## 2018-10-05 NOTE — PROGRESS NOTES
HISTORY OF PRESENT ILLNESS:    Cristy Bailey is a 73 year old female who is seen in follow up for Right hip I&D 8/30/2018, and R JENAE DOS 7/30/2018, Dr. Garcia.  Present symptoms: pt states lots of pain in the lower legs.  States taking dilaudid 1-2 pills per day, however received 45 tabs 9/20 and is out today, (ave >3 tabs daily).  Tylenol does not seem to help.  Ices occasionally, some elevation, sits a lot, does not do much.  Only stands to transfer.  Has not called lympedema clinic, and states does not remember discussing her going at last visit.  States she does not want to go as it hurts to get in the car, however admits she came today to see me in the car.    Denies Chest pain, Calve pain, Fever, Chills.    Current Treatment: postop, pain medication, un scheduled lymphedema referral.     PHYSICAL EXAM:  There were no vitals taken for this visit.  There is no height or weight on file to calculate BMI.   GENERAL APPEARANCE: healthy, alert and no distress   PSYCH:  mentation appears normal and affect normal/bright    MSK:  Right: Hip. .  Ambulates: presents in WC..  Incision clean and dry, 1 cm tape burn present at proximal incision otherwise, healing.  NO incisional erythema.   No Ecchymosis.  No calve pain on palpation.  Edema Min at hip, bilateral lower leg edema, mildly tender to touch.  CMS: joesph incisional numbness, otherwise grossly intact..      IMAGING INTERPRETATION:  None today..     ASSESSMENT:  Cristy Bailey is a 73 year old female S/P Right hip I&D 8/30/2018, and R JENAE DOS 7/30/2018, Dr. Garcia..    No overt signs of infection, no pain at surgical hip.  Main issue is lower legs.  Non compliant with referral recommendation.    PLAN:  - we discussed that I will not treat her leg pain, she needs to be evaluated by lymphedema or PCP for neuropathy.  - Increase gabapentin.  - refill diluadid with instructions to taper.  - again provided lymphedema referral info.    Return to clinic  SAMMIE.    Donal Grover PA-C    Dept. Orthopedic Surgery  [facility]   10/5/2018

## 2018-10-05 NOTE — TELEPHONE ENCOUNTER
Not due for refill on Celexa.  Prescription approved per [facility] Refill Protocol.  Routing refill request to provider for review/approval because:  Drug not on the [facility] refill protocol     Khushboo Gonzalez RN

## 2018-10-05 NOTE — TELEPHONE ENCOUNTER
"Different pharmacy requesting Rx's.    Requested Prescriptions   Pending Prescriptions Disp Refills     acetaminophen (TYLENOL) 325 MG tablet  Last Written Prescription Date:  Transitional 8/2/18  Last Fill Quantity: 100,  # refills: na   Last office visit: 9/21/2018 with prescribing provider:  Manoj Daley PA-C   Future Office Visit:     100 tablet      Sig: Take 3 tablets (975 mg) by mouth every 8 hours    Analgesics (Non-Narcotic Tylenol and ASA Only) Passed    10/5/2018  2:26 PM       Passed - Recent (12 mo) or future (30 days) visit within the authorizing provider's specialty    Patient had office visit in the last 12 months or has a visit in the next 30 days with authorizing provider or within the authorizing provider's specialty.  See \"Patient Info\" tab in inbasket, or \"Choose Columns\" in Meds & Orders section of the refill encounter.           Passed - Patient is 7 months old or older    If patient is a peds patient of the age 7 mos -12 years, ok to refill using weight-based dosing.     If >3g daily and/or sig is not \"prn\", check for liver enzymes. If normal in the last year, ok to refill.  If not, refer to the provider.          ferrous sulfate (IRON) 325 (65 Fe) MG tablet  Last Written Prescription Date:  transitional  Last Fill Quantity: 100,  # refills: na   Last office visit: 9/21/2018 with prescribing provider:  Manoj Daley PA-C   Future Office Visit:     100 tablet      Sig: Take 1 tablet (325 mg) by mouth 2 times daily    Iron Supplements Passed    10/5/2018  2:26 PM       Passed - Patient is 12 years of age or older       Passed - Recent (12 mo) or future (30 days) visit within the authorizing provider's specialty    Patient had office visit in the last 12 months or has a visit in the next 30 days with authorizing provider or within the authorizing provider's specialty.  See \"Patient Info\" tab in inbasket, or \"Choose Columns\" in Meds & Orders section of the refill encounter.     " "      Passed - Hgb OR Hct on record within the past 12 mos.    Patient need only have had a HGB or HCT on file in the past 12 mos. That result does not need to be normal.    Recent Labs   Lab Test  08/30/18   0621  08/29/18   1028  08/02/18   0633   HGB  9.6*  9.8*  8.8*     Recent Labs   Lab Test  08/30/18   0621  04/30/18   1246  02/08/18   1907   HCT  32.2*  43.4  43.4       Please verify a HGB or HCT has been checked SINCE THE LAST DOSE CHANGE.            gabapentin (NEURONTIN) 300 MG capsule  Last Written Prescription Date:  5/10/18  Last Fill Quantity: 180,  # refills: 0   Last office visit: 9/21/2018 with prescribing provider:  Manoj Daley PA-C   Future Office Visit:     180 capsule 0     Sig: Take 1 capsule (300 mg) by mouth 2 times daily    There is no refill protocol information for this order        citalopram (CELEXA) 10 MG tablet  Last Written Prescription Date:  9/21/18  Last Fill Quantity: 90,  # refills: 0   Last office visit: 9/21/2018 with prescribing provider:  Manoj Daley PA-C   Future Office Visit:     90 tablet 0     Sig: Take 1 tablet (10 mg) by mouth daily    SSRIs Protocol Passed    10/5/2018  2:26 PM  No flowsheet data found.  No flowsheet data found.             Passed - Recent (12 mo) or future (30 days) visit within the authorizing provider's specialty    Patient had office visit in the last 12 months or has a visit in the next 30 days with authorizing provider or within the authorizing provider's specialty.  See \"Patient Info\" tab in inbasket, or \"Choose Columns\" in Meds & Orders section of the refill encounter.           Passed - Patient is age 18 or older       Passed - No active pregnancy on record       Passed - No positive pregnancy test in last 12 months        senna-docusate (SENOKOT-S;PERICOLACE) 8.6-50 MG per tablet  Last Written Prescription Date:  9/21/18  Last Fill Quantity: 60,  # refills: 1   Last office visit: 9/21/2018 with prescribing provider:  " "Manoj Daley PA-C   Future Office Visit:     60 tablet 1     Sig: Take 1 tablet by mouth 2 times daily as needed for constipation    Laxatives Protocol Passed    10/5/2018  2:26 PM       Passed - Patient is age 6 or older       Passed - Recent (12 mo) or future (30 days) visit within the authorizing provider's specialty    Patient had office visit in the last 12 months or has a visit in the next 30 days with authorizing provider or within the authorizing provider's specialty.  See \"Patient Info\" tab in inbasket, or \"Choose Columns\" in Meds & Orders section of the refill encounter.            polyethylene glycol (MIRALAX/GLYCOLAX) Packet  Last Written Prescription Date:  transitional  Last Fill Quantity: 7,  # refills: na   Last office visit: 9/21/2018 with prescribing provider:  Manoj Daley PA-C   Future Office Visit:     7 packet      Sig: Take 17 g by mouth 2 times daily as needed (constipation)    Laxatives Protocol Passed    10/5/2018  2:26 PM       Passed - Patient is age 6 or older       Passed - Recent (12 mo) or future (30 days) visit within the authorizing provider's specialty    Patient had office visit in the last 12 months or has a visit in the next 30 days with authorizing provider or within the authorizing provider's specialty.  See \"Patient Info\" tab in inbasket, or \"Choose Columns\" in Meds & Orders section of the refill encounter.              "

## 2018-10-05 NOTE — TELEPHONE ENCOUNTER
Pt calling in crying- needing her legs wrapped and her bandage changed on wound. She had [facility] home [facility]- they do not do leg wraps- patient cancelled them. Had spoken to a Bry w/  homecare but did not get a number to call him back to verify she cancelled [facility].   I tried calling PAULETTE Hernandez- left message.   Call to  HC #[phone] talked to 3 different people. Went through what is needed w/ RN.   Will call WALDO Grover's office to ask for HC order for nursing and lymphedema. Call to [facility] office- left message asking for them to do HC orders since they are the one's that did the last HC order. Call back if they cant or have questions.     Bandage last changed 10/4/18.     Best # for pt. [phone]    Talked to Torie-  HC needs orders.     Call to pt to let her know as well what is going on.  homecare needs order for:  Homecare nursing and lymphedema.     Teri WHITE RN

## 2018-10-05 NOTE — TELEPHONE ENCOUNTER
Received voicemail from JEEVAN Williamson [place] stating patient recently saw David Grover PA-C.   She needs an updated Home Care order sent to [facility] as she is no longer using [facility]. She would like either a return call back or for us to contact patient.     We did not get a call back from patient or [facility] care. Phone call to [facility] to inform that David Grover PA-C has already left for the day and they had informed us previously that the provider must sign the order vs nursing staff.   Spoke with Uma.   They need a current order in order to start home care and lyphedema care. She was able to assist with setting up the correct order. Will have David Grover PA-C address it on Monday afternoon when he returns to the office.     Phone call to patient and informed.   She would like a call back on Monday once order is sent. She was given [facility]'s number to contact them.     Please see order pending completion. Order must have some type of RN care in addition to the lymphedema occupational therapy evaluation and treat. (They have  occupational therapists that are lymphedema therapists that evaluate patient and then will contact PCP with recommendations and to get orders moving forward for treatment at home. Patient is not required to go to an outpatient lymphedema clinic this way and can be managed at home. The referring provider does not have to give orders recommended by therapists and are usually obtained by PCP who is managing patient's total care.     Routing to David Grover PA-C.     DIEGO Javed RN

## 2018-10-08 RX ORDER — GABAPENTIN 300 MG/1
300 CAPSULE ORAL 2 TIMES DAILY
Qty: 180 CAPSULE | Refills: 0 | Status: SHIPPED | OUTPATIENT
Start: 2018-10-08 | End: 2018-11-28

## 2018-10-08 NOTE — TELEPHONE ENCOUNTER
Phone call to [facility] to verify they received order and it is complete.  She states order looks good. Informed I will let patient know that they will be contacting her.     Phone call to patient and informed of the above.   She verbalized understanding.     DIEGO Javed RN

## 2018-10-09 ENCOUNTER — TELEPHONE (OUTPATIENT)
Dept: FAMILY MEDICINE | Facility: CLINIC | Age: 73
End: 2018-10-09

## 2018-10-09 ENCOUNTER — TELEPHONE (OUTPATIENT)
Dept: ORTHOPEDICS | Facility: CLINIC | Age: 73
End: 2018-10-09

## 2018-10-09 NOTE — TELEPHONE ENCOUNTER
Call from Kelley with [facility] wanting verbal orders:   Skilled Nursing 2x/ week for 3 weeks, and 1x/ week for 3 weeks.   Physical Therapy, Occupational Therapy, and Lymphadenia Therapy.    Patient is s/p Right hip I&D 8/30/2018, and R JENAE DOS 7/30/2018, Dr. Garcia.    Returned Kelley's call to confirm verbal orders.     Keira Santiago, ATC

## 2018-10-11 ENCOUNTER — TELEPHONE (OUTPATIENT)
Dept: ORTHOPEDICS | Facility: CLINIC | Age: 73
End: 2018-10-11

## 2018-10-11 NOTE — TELEPHONE ENCOUNTER
Received call from [facility], physical therapy, asking for orders to continue physical therapy 2x/wk x 3 weeks for strengthening, transferring, and possible gait training.   Patient currently stands with assist and takes 2-3 steps to a wheelchair only.   Sleeps in recliner.   Verbal order given as requested for continuation of  previous order given 10/9/18 telephone encounter.     DIEGO Javed RN

## 2018-10-16 ENCOUNTER — TELEPHONE (OUTPATIENT)
Dept: FAMILY MEDICINE | Facility: CLINIC | Age: 73
End: 2018-10-16

## 2018-10-16 NOTE — TELEPHONE ENCOUNTER
Reason for Call:  Home Health Care    Farnaz ROMÁN with  Homecare called regarding (reason for call):     Orders are needed for this patient.     PT:     OT: Lymphadema order  2 times a week for 5 weeks  Includes manual lymph massage  Gradiant compression bandaging  fluid immobilization exercises  agitation on edema managment    Trial of long term compression garments    Skilled Nursing:     Pt Provider:     Phone Number Homecare Nurse can be reached at: [phone]    Can we leave a detailed message on this number? Yes      Phone number patient can be reached at:     Best Time: any    Call taken on 10/16/2018 at 9:45 AM by Francisca Salazar

## 2018-10-22 ENCOUNTER — PATIENT OUTREACH (OUTPATIENT)
Dept: CARE COORDINATION | Facility: CLINIC | Age: 73
End: 2018-10-22

## 2018-10-22 DIAGNOSIS — I10 HTN, GOAL BELOW 140/90: ICD-10-CM

## 2018-10-22 RX ORDER — LISINOPRIL 5 MG/1
TABLET ORAL
Qty: 90 TABLET | Refills: 0 | Status: CANCELLED | OUTPATIENT
Start: 2018-10-22

## 2018-10-22 NOTE — PROGRESS NOTES
Clinic Care Coordination Contact    Situation: Patient chart reviewed by care coordinator.    Background: Patient opened to [facility].      Assessment: Still open to home care and lymphedema care.    Plan/Recommendations: CC will do chart review in one month or call if notified of discharge from Home Care.    Francisca Hernandez,   [facility]  [email or web address]  [phone]

## 2018-10-22 NOTE — TELEPHONE ENCOUNTER
"Requested Prescriptions   Pending Prescriptions Disp Refills     lisinopril (PRINIVIL/ZESTRIL) 5 MG tablet [Pharmacy Med Name: Lisinopril Oral Tablet 5 MG]  Last Written Prescription Date:  3/27/18  Last Fill Quantity: 90,  # refills: 1   Last office visit: 9/21/2018 with prescribing provider:  Manoj Daley PA-C   Future Office Visit:     90 tablet 0     Sig: Take 1 tablet (5 mg) by mouth daily    ACE Inhibitors (Including Combos) Protocol Passed    10/22/2018  7:00 AM       Passed - Blood pressure under 140/90 in past 12 months    BP Readings from Last 3 Encounters:   09/21/18 123/80   09/03/18 117/50   08/02/18 99/43                Passed - Recent (12 mo) or future (30 days) visit within the authorizing provider's specialty    Patient had office visit in the last 12 months or has a visit in the next 30 days with authorizing provider or within the authorizing provider's specialty.  See \"Patient Info\" tab in inbasket, or \"Choose Columns\" in Meds & Orders section of the refill encounter.             Passed - Patient is age 18 or older       Passed - No active pregnancy on record       Passed - Normal serum creatinine on file in past 12 months    Recent Labs   Lab Test  08/01/18   0737   CR  0.69            Passed - Normal serum potassium on file in past 12 months    Recent Labs   Lab Test  08/01/18   0737   POTASSIUM  3.8            Passed - No positive pregnancy test in past 12 months          "

## 2018-10-24 NOTE — TELEPHONE ENCOUNTER
Routing refill request to provider for review/approval because:  Drug not active on patient's medication list. Medication dc'd 8/2/18 by KRISH Mercado.   Teri WHITE RN

## 2018-10-25 NOTE — TELEPHONE ENCOUNTER
It looks like this was stopped after her surgery. She was controlled when seen 9/21 without and we discussed continuing off. I am ok without. I suspect this was a pharmacy generated request. Please just concur with patient.

## 2018-10-26 ENCOUNTER — TELEPHONE (OUTPATIENT)
Dept: FAMILY MEDICINE | Facility: CLINIC | Age: 73
End: 2018-10-26

## 2018-10-26 DIAGNOSIS — I10 HTN, GOAL BELOW 140/90: ICD-10-CM

## 2018-10-26 RX ORDER — LISINOPRIL 5 MG/1
5 TABLET ORAL DAILY
Qty: 90 TABLET | Refills: 0 | Status: SHIPPED | OUTPATIENT
Start: 2018-10-26 | End: 2019-01-22

## 2018-10-26 NOTE — TELEPHONE ENCOUNTER
JEEVAN Todd, [facility] calling ([phone]).     At visit today, noted patient has been taking 600 mg BID, per prescription from May 2018 (unsure of prescriber as nurse is no longer at house and cannot check the bottle). Along the the gabapentin, patient has also been taking 650 mg Tylenol, BID. Due to this, the patient has noted much better pain relief. Peyton is wondering if patient should continue with this dose or follow most recent prescription of 300 mg BID?    Also, patient thinks she has been having high BP, she found an old bottle of lisinopril and has been taking 5mg daily for a couple of days now. Peyton advised patient to stop taking the lisinopril until she hears back from PCP.    BP today was 124/80  10/18/18 visit it was 122/68    BP Readings from Last 3 Encounters:   09/21/18 123/80   09/03/18 117/50   08/02/18 99/43     Can call Peyton back today at above number and leave detailed message with instructions and she will contact the patient.    Keena CHAPMAN Triage RN

## 2018-10-26 NOTE — TELEPHONE ENCOUNTER
Huddled with Kulwant Daley: He believes the higher dose of gabapentin was prescribed by ortho and as long as they are fine with the 600 mg BID, he is okay with that. He also stated he was thinking of starting her back up on lisinopril. Ok to send in prescription for 90 tabs with 0 refills to pharmacy for lisinopril, 5 mg, PO, daily.    Called Peyton back to advise as above. Advised to check with ortho (prescribing doctor for the gabapentin) and to keep an eye on Cristy's BP. Peyton stated understanding.    Keena CHAPMAN, Triage RN

## 2018-10-26 NOTE — TELEPHONE ENCOUNTER
See telephone encounter 10/26/18. Huddled with brielle Gudino to start back on 5 mg daily, can refill for 90 with 0 refills at 5 mg dose daily.    Keena CHAPMAN, Triage RN

## 2018-10-30 ENCOUNTER — TELEPHONE (OUTPATIENT)
Dept: ORTHOPEDICS | Facility: CLINIC | Age: 73
End: 2018-10-30

## 2018-10-30 DIAGNOSIS — M25.561 ARTHRALGIA OF BOTH LOWER LEGS: Primary | ICD-10-CM

## 2018-10-30 DIAGNOSIS — I10 HTN, GOAL BELOW 140/90: ICD-10-CM

## 2018-10-30 DIAGNOSIS — M25.562 ARTHRALGIA OF BOTH LOWER LEGS: Primary | ICD-10-CM

## 2018-10-30 RX ORDER — LISINOPRIL 5 MG/1
TABLET ORAL
Qty: 90 TABLET | Refills: 2 | Status: SHIPPED | OUTPATIENT
Start: 2018-10-30 | End: 2019-01-22

## 2018-10-30 RX ORDER — TRAMADOL HYDROCHLORIDE 50 MG/1
50 TABLET ORAL EVERY 6 HOURS PRN
Qty: 45 TABLET | Refills: 0 | Status: SHIPPED | OUTPATIENT
Start: 2018-10-30 | End: 2018-12-17

## 2018-10-30 NOTE — TELEPHONE ENCOUNTER
"Requested Prescriptions   Pending Prescriptions Disp Refills     lisinopril (PRINIVIL/ZESTRIL) 5 MG tablet [Pharmacy Med Name: Lisinopril Oral Tablet 5 MG] 90 tablet 0     Sig: Take 1 tablet (5 mg) by mouth daily    ACE Inhibitors (Including Combos) Protocol Passed    10/30/2018 10:03 AM       Passed - Blood pressure under 140/90 in past 12 months    BP Readings from Last 3 Encounters:   09/21/18 123/80   09/03/18 117/50   08/02/18 99/43                Passed - Recent (12 mo) or future (30 days) visit within the authorizing provider's specialty    Patient had office visit in the last 12 months or has a visit in the next 30 days with authorizing provider or within the authorizing provider's specialty.  See \"Patient Info\" tab in inbasket, or \"Choose Columns\" in Meds & Orders section of the refill encounter.             Passed - Patient is age 18 or older       Passed - No active pregnancy on record       Passed - Normal serum creatinine on file in past 12 months    Recent Labs   Lab Test  08/01/18   0737   CR  0.69            Passed - Normal serum potassium on file in past 12 months    Recent Labs   Lab Test  08/01/18   0737   POTASSIUM  3.8            Passed - No positive pregnancy test in past 12 months        Prescription approved per [facility] Refill Protocol.    "

## 2018-10-30 NOTE — PROGRESS NOTES
Increase gabapentin to 600 3x daily.  Tramadol 50 mg Q 6 hours.  Is she receiving Lymphedema tx as recommended?

## 2018-10-30 NOTE — TELEPHONE ENCOUNTER
Reason for call:  Peyton the Homehealth RN called to clarify dosage for Gabapentin due to concerns that patient is still in quite a bit of pain.  Patient currently taking 300mg 1 tablet in the day, 2 tablets at night along with 650 mg of tylenol    Phone number to reach RN:  Other phone number:  [phone]    Can we leave a detailed message on this number?  YES     Called and talked to Peyton. Had a discussion with David Grover about the Gabapentin and he said it was ok to take 600mg 3 times a day. He also wrote a script for Tramadol. Peyton stated she will talk to the patient but she is worried about the medication she takes. I informed her to call back and let us know if she wants to fill the prescription and if so we will leave it up front but will need to know who will be picking it up.    David also wanted to know if she was receiving lymphedema treatment and Peyton confirmed that she is, she started today.    Kasey Ireland ATC

## 2018-11-01 ENCOUNTER — TELEPHONE (OUTPATIENT)
Dept: FAMILY MEDICINE | Facility: CLINIC | Age: 73
End: 2018-11-01

## 2018-11-01 NOTE — TELEPHONE ENCOUNTER
Received Home health Certification and Plan of Care, placed in Kulwant's in basket.    Please review, sign and fax back to [phone].

## 2018-11-06 DIAGNOSIS — Z53.9 DIAGNOSIS NOT YET DEFINED: Primary | ICD-10-CM

## 2018-11-06 PROCEDURE — G0180 MD CERTIFICATION HHA PATIENT: HCPCS | Performed by: FAMILY MEDICINE

## 2018-11-19 ENCOUNTER — MEDICAL CORRESPONDENCE (OUTPATIENT)
Dept: HEALTH INFORMATION MANAGEMENT | Facility: CLINIC | Age: 73
End: 2018-11-19

## 2018-11-27 ENCOUNTER — PATIENT OUTREACH (OUTPATIENT)
Dept: CARE COORDINATION | Facility: CLINIC | Age: 73
End: 2018-11-27

## 2018-11-27 ASSESSMENT — ACTIVITIES OF DAILY LIVING (ADL): DEPENDENT_IADLS:: COOKING;CLEANING;LAUNDRY;SHOPPING;MEAL PREPARATION;TRANSPORTATION

## 2018-11-27 NOTE — PROGRESS NOTES
Clinic Care Coordination Contact    Clinic Care Coordination Contact  OUTREACH    Referral Information:  Referral Source: IP Report    Primary Diagnosis: Chronic Pain    Chief Complaint   Patient presents with     Clinic Care Coordination - Follow-up        Universal Utilization: Appropriate utilization  Clinic Utilization  Difficulty keeping appointments:: No  Compliance Concerns: No  No-Show Concerns: No  No PCP office visit in Past Year: No  Utilization    Last refreshed: 11/21/2018  3:08 PM:  No Show Count (past year) 0       Last refreshed: 11/21/2018  3:08 PM:  ED visits 2       Last refreshed: 11/21/2018  3:08 PM:  Hospital admissions 2          Current as of: 11/21/2018  3:08 PM             Clinical Concerns:  Current Medical Concerns:  Has graduated from home care, but is still not able to ambulate. Leo has to help her into the bathroom and she cannot be left alone.  She misses the therapists and their work with her.  She has the exercises they talk her. She is wearing compression stockings.  Leo modified a wheelchair that she can use in her home.  Is getting pain medications from PA at orthopedics.  Doesn't know how long that will continue. Is sad to think that she may never get better.   Discussed that using a pain clinic might be the next step.    Current Behavioral Concerns: Cries off and on during the call. Appreciated the call and will consider mental health options for herself.      Education Provided to patient: none provided   Pain  Pain (GOAL):: Yes  Health Maintenance Reviewed: Due/Overdue   Health Maintenance Due   Topic Date Due     DEPRESSION ACTION PLAN  05/30/1963     PHQ-9 Q6 MONTHS  05/30/1963     HEPATITIS C SCREENING  05/30/1963     PNEUMOCOCCAL (1 of 2 - PCV13) 05/30/2012     MAMMO SCREEN Q2 YR (SYSTEM ASSIGNED)  08/01/2013     INFLUENZA VACCINE (1) 09/01/2018         Functional Status:  Dependent ADLs:: Wheelchair-independent, Ambulation-walker  Dependent IADLs:: Cooking, Cleaning,  Laundry, Shopping, Meal Preparation, Transportation  Bed or wheelchair confined:: Yes  Mobility Status: Dependent/Assisted by Another    Living Situation:  Current living arrangement:: I live in a private home with spouse  Type of residence:: Private home - no stairs    Diet/Exercise/Sleep:  Diet:: Regular  Inadequate nutrition (GOAL):: No  Exercise:: Unable to exercise  Inadequate activity/exercise (GOAL):: No  Significant changes in sleep pattern (GOAL): No    Transportation:  Transportation concerns (GOAL):: No  Transportation means:: Regular car, Family     Psychosocial:  Mental health DX:: Yes  Mental health DX how managed:: Medication  Mental health management concern (GOAL):: Yes  Informal Support system:: Family, Spouse       Resources and Interventions:  Current Resources:    ;   Community Resources: None  Patient declines help from waiver services. Would love to have therapies continue but understands that insurance coverage is not ongoing.       Equipment Currently Used at Home: walker, standard, wheelchair, manual    Advance Care Plan/Directive  Advanced Care Plans/Directives on file:: No    Referrals Placed: None    Patient/Caregiver understanding: CC will call in one week after patient has time to think about conversation today.  CC can assist with mental health resources, referral to pain clinic, volunteer support.      Outreach Frequency: monthly    Plan: CC to call in one week. Patient has CC phone number and will call if needs arise prior to next outreach.      Francisca Hernandez,   [facility]  [email or web address]  [phone]

## 2018-11-28 ENCOUNTER — TELEPHONE (OUTPATIENT)
Dept: ORTHOPEDICS | Facility: CLINIC | Age: 73
End: 2018-11-28

## 2018-11-28 DIAGNOSIS — R29.898 WEAKNESS OF BOTH LOWER EXTREMITIES: ICD-10-CM

## 2018-11-28 DIAGNOSIS — M79.605 BILATERAL LEG PAIN: ICD-10-CM

## 2018-11-28 DIAGNOSIS — M79.604 BILATERAL LEG PAIN: ICD-10-CM

## 2018-11-28 RX ORDER — GABAPENTIN 300 MG/1
CAPSULE ORAL
Qty: 180 CAPSULE | Refills: 0 | Status: SHIPPED | OUTPATIENT
Start: 2018-11-28 | End: 2019-01-09

## 2018-11-28 NOTE — TELEPHONE ENCOUNTER
Reason for call:  [facility] pharmacy in [place] called to see if the patient was transferring care to another provider or going elsewhere for treatment. She is trying to refill Gabapentin.    Phone number:  [phone]    Kasey Ireland ATC

## 2018-11-28 NOTE — TELEPHONE ENCOUNTER
Called Cristy to inform her that David Grover PA-C will refill her Gabapentin for 1 fill, and recommends having PCP take over Rx for long term management of neuropathy, and chronic leg pains.     She was agreeable to the plan to have PCP take over Gabapentin regimen after this fill and will message her PCP.     Keira Santiago, ATC

## 2018-11-30 ENCOUNTER — MYC MEDICAL ADVICE (OUTPATIENT)
Dept: ORTHOPEDICS | Facility: CLINIC | Age: 73
End: 2018-11-30

## 2018-11-30 DIAGNOSIS — Z96.641 S/P HIP REPLACEMENT, RIGHT: ICD-10-CM

## 2018-11-30 RX ORDER — HYDROMORPHONE HYDROCHLORIDE 2 MG/1
2 TABLET ORAL EVERY 6 HOURS PRN
Qty: 25 TABLET | Refills: 0 | Status: SHIPPED | OUTPATIENT
Start: 2018-11-30 | End: 2018-12-17

## 2018-11-30 NOTE — TELEPHONE ENCOUNTER
Phone call to patient and informed of David' recommendations below.   Prescription left at  for house mate, Leo Akbar to pickup today.   She verbalized understanding.    DIEGO Javed RN

## 2018-11-30 NOTE — TELEPHONE ENCOUNTER
Last refill for pain medication from our office due to:    1.  Pain is not coming from the operative hip  2.  She needs ongoing cares, better suited for the PCP.    rx left with triage for pt contact.    Donal Grover PA-C  [facility]

## 2018-12-11 ENCOUNTER — MYC REFILL (OUTPATIENT)
Dept: ORTHOPEDICS | Facility: CLINIC | Age: 73
End: 2018-12-11

## 2018-12-11 DIAGNOSIS — M25.562 ARTHRALGIA OF BOTH LOWER LEGS: ICD-10-CM

## 2018-12-11 DIAGNOSIS — Z96.641 S/P HIP REPLACEMENT, RIGHT: ICD-10-CM

## 2018-12-11 DIAGNOSIS — M25.561 ARTHRALGIA OF BOTH LOWER LEGS: ICD-10-CM

## 2018-12-13 ENCOUNTER — MYC MEDICAL ADVICE (OUTPATIENT)
Dept: FAMILY MEDICINE | Facility: CLINIC | Age: 73
End: 2018-12-13

## 2018-12-13 RX ORDER — TRAMADOL HYDROCHLORIDE 50 MG/1
50 TABLET ORAL EVERY 6 HOURS PRN
Qty: 45 TABLET | Refills: 0 | OUTPATIENT
Start: 2018-12-13

## 2018-12-13 RX ORDER — HYDROMORPHONE HYDROCHLORIDE 2 MG/1
2 TABLET ORAL EVERY 6 HOURS PRN
Qty: 25 TABLET | Refills: 0 | OUTPATIENT
Start: 2018-12-13

## 2018-12-13 NOTE — TELEPHONE ENCOUNTER
Patient requesting pain medication refill via mychart.     Per telephone encounter note of 11/30/18:     Last refill for pain medication from our office due to:     1.  Pain is not coming from the operative hip  2.  She needs ongoing cares, better suited for the PCP.    Sent mychart to patient declining refill request.     DIEGO Javed RN

## 2018-12-13 NOTE — TELEPHONE ENCOUNTER
"Please see patient refill request. Patient had been informed to follow up with pcp for further pain medication.     11/30/2018:    \"Note      Last refill for pain medication from our office due to:     1.  Pain is not coming from the operative hip  2.  She needs ongoing cares, better suited for the PCP.     rx left with triage for pt contact.     Donal Grover PA-C  [facility]        Please advise if patient needs office visit? E-visit?    Ellie Coon RN    "

## 2018-12-14 NOTE — TELEPHONE ENCOUNTER
She should schedule a visit - could be telephone but if it is then I would like it to take an actual slot. Hydromorphone would not be recommended to continue and it looks like ortho, who started her on this medication, had no plan as to taper. I would like to discuss with her how we're going to address concerns and move forward.

## 2018-12-14 NOTE — TELEPHONE ENCOUNTER
Informed patient of provider message. Will call back to schedule an appointment.    Ellie Coon RN

## 2018-12-17 ENCOUNTER — VIRTUAL VISIT (OUTPATIENT)
Dept: FAMILY MEDICINE | Facility: CLINIC | Age: 73
End: 2018-12-17
Payer: COMMERCIAL

## 2018-12-17 ENCOUNTER — PATIENT OUTREACH (OUTPATIENT)
Dept: CARE COORDINATION | Facility: CLINIC | Age: 73
End: 2018-12-17

## 2018-12-17 DIAGNOSIS — R29.898 WEAKNESS OF BOTH LOWER EXTREMITIES: ICD-10-CM

## 2018-12-17 DIAGNOSIS — F43.23 ADJUSTMENT DISORDER WITH MIXED ANXIETY AND DEPRESSED MOOD: ICD-10-CM

## 2018-12-17 DIAGNOSIS — M79.605 BILATERAL LEG PAIN: Primary | ICD-10-CM

## 2018-12-17 DIAGNOSIS — M79.604 BILATERAL LEG PAIN: Primary | ICD-10-CM

## 2018-12-17 PROCEDURE — 99443 ZZC PHYSICIAN TELEPHONE EVALUATION 21-30 MIN: CPT | Performed by: PHYSICIAN ASSISTANT

## 2018-12-17 RX ORDER — CITALOPRAM HYDROBROMIDE 20 MG/1
20 TABLET ORAL DAILY
Qty: 90 TABLET | Refills: 1 | Status: SHIPPED | OUTPATIENT
Start: 2018-12-17 | End: 2019-01-28

## 2018-12-17 RX ORDER — HYDROCODONE BITARTRATE AND ACETAMINOPHEN 5; 325 MG/1; MG/1
1 TABLET ORAL AT BEDTIME
Qty: 25 TABLET | Refills: 0 | Status: SHIPPED | OUTPATIENT
Start: 2018-12-17 | End: 2019-01-09

## 2018-12-17 NOTE — PROGRESS NOTES
Clinic Care Coordination Contact    Clinic Care Coordination Contact  OUTREACH  Primary Diagnosis: Chronic Pain     Universal Utilization: has difficulty getting into clinic.    Clinic Utilization  Difficulty keeping appointments:: No  Compliance Concerns: No  No-Show Concerns: No  No PCP office visit in Past Year: No  Utilization    Last refreshed: 12/17/2018  1:29 PM:  Hospital Admissions 2           Last refreshed: 12/17/2018  1:29 PM:  ED Visits 2           Last refreshed: 12/17/2018  1:29 PM:  No Show Count (past year) 0              Current as of: 12/17/2018  1:29 PM            Clinical Concerns:  Current Medical Concerns:  Waiting for a call from her PCP to do a telephonic OV.  She is requesting pain medications and is unable to get into a car without extreme pain and would prefer a phone visit.  Didn't want to talk long due to expecting PCP call.      Transportation:  Transportation concerns (GOAL):: No  Transportation means:: Regular car, Family- is home bound.      Patient/Caregiver understanding: She will talk to PCP about ongoing pain and treatment options.  CC to call in one week to assess needs.     Outreach Frequency: monthly    Plan: CC to do outreach in one week.      Francisca Hernandez,   [facility]  [email or web address]  [phone]

## 2018-12-17 NOTE — PROGRESS NOTES
"  SUBJECTIVE:   Cristy Bailey is a 73 year old female who presents to clinic today for the following health issues:    Patient is needing to discuss recent medication that was started.     She has has bilateral unexplained LE weakness and pain  She has been seen by neurology without explanation  MRI showed no spinal cord compression but disuse atrophy of the proximal muscles  Neurology note:  She had normal strength in her limbs except for moderately severe right thenar weakness with some evidence of atrophy and also continued problems with proximal leg weakness which relates to severe pain inhibition from her hip disease.     EMG showed CTS bilateral   She is getting lymphedema treatment/Wraps  She underwent right hip replacement with Dr. Garcia (avascular necrosis of hip)  She needed increased pain control following surgery and orthopedics started her on tramadol and hydromorphone, q 6 hours as needed  They have continued her on gabapentin as well (around 4 x 300mg daily)  Ortho recently deferred filling hydromorphone to me?   -She is taking 1 nightly prior to sleep      TODAY:  Patient is tearful immediately from start of the phone call  She asks, \"is there any way you can tell me what's wrong with my legs?\"  Pain is worse than the surgery  She can stand on her right leg, to get out of the wheelchair; there is NO pain in the right hip  Still cannot walk  She notes persistent left hip pain; MRI showed severe osteoarthritis  There is also persistent lower extremity pain, below the knees; \"the whole leg\"   -\"pressure\" throughout the whole leg  She does have her friend help get the compression socks on   -no longer doing the lymphedema wrapping   -she notes no body came after one visit to check the legs      Problem list and histories reviewed & adjusted, as indicated.  Additional history: as documented    Patient Active Problem List   Diagnosis     HTN (hypertension)     Hyperlipidemia LDL goal <160     Abnormal " glucose     Obesity     Leg weakness, bilateral     CRP elevated     Vitamin D deficiency     Health Care Home     HTN, goal below 140/90     Constipation     Leg weakness     Constipation     Myalgia and myositis     Morbid obesity (H)     Avascular necrosis of bone of hip, right (H)     Hip osteoarthritis     Past Surgical History:   Procedure Laterality Date     ARTHROPLASTY HIP Right 2018    Procedure: ARTHROPLASTY HIP;  Right total hip arthroplasty;  Surgeon: Bry Garcia MD;  Location: [facility] OR     BIOPSY OF UTERUS LINING  3/2010    negative.      C C-SEC ONLY,PREV C-SEC      c-sec x 3      COLONOSCOPY  2/21/10    benign findings. advised 10 yr f/u.      INCISION AND DRAINAGE HIP, COMBINED Right 2018    Procedure: COMBINED INCISION AND DRAINAGE HIP;  Incision and debridement, right hip ;  Surgeon: Bry Garcia MD;  Location:  OR     ORTHOPEDIC SURGERY Right 2018    Right hip I & D 18, Right JENAE, DOS 2018, Dr. Garcia. [facility]       Social History     Tobacco Use     Smoking status: Former Smoker     Last attempt to quit: 1984     Years since quittin.3     Smokeless tobacco: Never Used   Substance Use Topics     Alcohol use: No     Family History   Problem Relation Age of Onset     Cerebrovascular Disease Father         -stroke at age 80     Family History Negative Mother          Diedn her 80's of unknown causes.  Pt otherwise unaware of her health hx.      Unknown/Adopted Mother      Diabetes Brother         (Christian)  of DM complications at age 50.     Alcohol/Drug Brother         Christian     C.A.D. Brother         Christian.      Heart Disease Brother         Christian--MI age 50.     Family History Negative Brother      Family History Negative Brother      Family History Negative Sister      Family History Negative Sister      Family History Negative Son      Family History Negative Daughter      Family History Negative Daughter             Reviewed and updated as needed this visit by clinical staff       Reviewed and updated as needed this visit by Provider         ROS:  Constitutional, HEENT, cardiovascular, pulmonary, gi and gu systems are negative, except as otherwise noted.    OBJECTIVE:     There were no vitals taken for this visit.  There is no height or weight on file to calculate BMI.  PSYCH: tearful and speech pressured, at times hysterical     Diagnostic Test Results:  none     ASSESSMENT/PLAN:   1. Bilateral leg pain  2. Weakness of both lower extremities  I am unclear how to approach Cristy at this point. She has had Right Total hip repair, and may require left, but continues with such intense pain and weakness (unexplained at this time) that she cannot walk. She has been worked up extensively without obvious etiology. More recently, her ortho PA refused to continue the hydromorphone and deferred the RX back to us. I do not want her to continue on the medication, especially as it has not been effective for controlling the symptoms. I will change her back to norco and eventually hope to wean her off. I explained to her that we may need a more comprehensive look at her pain, with psychological aspect incorporated. She has deferred this aspect for awhile. I do not think she is mentally creating the pain, but I think that's adding to it. I would like her to discuss with pain management to see if they will have any benefit or ideas. I shared with her that I do not know what more to do at this point.   - PAIN MANAGEMENT REFERRAL  - HYDROcodone-acetaminophen (NORCO) 5-325 MG tablet; Take 1 tablet by mouth At Bedtime for 25 days  Dispense: 25 tablet; Refill: 0    3. Adjustment disorder with mixed anxiety and depressed mood  We will try an increased dose. Fortunately she did tolerate this. I continue to think mood playing a large role in the total pain picture. She is less convinced and declines psychiatry/therapy.   - citalopram (CELEXA) 20  MG tablet; Take 1 tablet (20 mg) by mouth daily  Dispense: 90 tablet; Refill: 1      Greater than 21 minutes was spent on discussing the above symptoms and coordination of care.    Manoj Daley PA-C  [facility]

## 2018-12-31 NOTE — PROGRESS NOTES
HISTORY OF PRESENT ILLNESS:    Cristy Bailey is a 73 year old female who is seen as self referral for left hip pain and to establish care with Dr. Rodriguez. Patient would like to discuss left JENAE today.   Present symptoms: Reports insidious onset without acute precipitating event.. Location of Pain: left hip, nonradiating. Most of her pain is located bilateral lower leg, and feet. Duration of Pain: 1 year(s). Rating of Pain at worst: 7/10. Rating of Pain Currently: 5/10. Pain is better with: activity avoidance  Pain is worse with: standing and walking .  Associated symptoms: swelling marked in feet and lower legs.   Treatments tried to this point: Pain medication: acetaminophen (TYLENOL) and gabapentin (NEURONTIN).  Orthopedic PMH: MRI completed 2018, right JENAE, Dr. Garcia, 2018 .  Postoperative drainage which required additional irrigation and debridement and    Past Medical History:   Diagnosis Date     Arthritis     hip     HTN (hypertension) 2010      Leg weakness 3/24/2015     Lyme disease  AND     lYME DZ LIKE SXS RESPONDED TO ABX       Past Surgical History:   Procedure Laterality Date     ARTHROPLASTY HIP Right 2018    Procedure: ARTHROPLASTY HIP;  Right total hip arthroplasty;  Surgeon: Bry Garcia MD;  Location: [facility] OR     BIOPSY OF UTERUS LINING  3/2010    negative.      C C-SEC ONLY,PREV C-SEC      c-sec x 3      COLONOSCOPY  2/21/10    benign findings. advised 10 yr f/u.      INCISION AND DRAINAGE HIP, COMBINED Right 2018    Procedure: COMBINED INCISION AND DRAINAGE HIP;  Incision and debridement, right hip ;  Surgeon: Bry Garcia MD;  Location: [facility] OR     ORTHOPEDIC SURGERY Right 2018    Right hip I & D 18, Right JENAE, DOS 2018, Dr. Garcia. [facility]       Family History   Problem Relation Age of Onset     Cerebrovascular Disease Father         -stroke at age 80     Family History Negative Mother          Diedn  her 80's of unknown causes.  Pt otherwise unaware of her health hx.      Unknown/Adopted Mother      Diabetes Brother         (Christian)  of DM complications at age 50.     Alcohol/Drug Brother         Christian     ADRLENE.A.D. Brother         Christian.      Heart Disease Brother         Christian--MI age 50.     Family History Negative Brother      Family History Negative Brother      Family History Negative Sister      Family History Negative Sister      Family History Negative Son      Family History Negative Daughter      Family History Negative Daughter        Social History     Socioeconomic History     Marital status:      Spouse name: Not on file     Number of children: 3     Years of education: Not on file     Highest education level: Not on file   Social Needs     Financial resource strain: Not on file     Food insecurity - worry: Not on file     Food insecurity - inability: Not on file     Transportation needs - medical: Not on file     Transportation needs - non-medical: Not on file   Occupational History     Occupation: computer repair     Employer: [employer]     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 Yes   Social History Narrative     Not on file       Current Outpatient Medications   Medication Sig Dispense Refill     acetaminophen (TYLENOL) 325 MG tablet Take 3 tablets (975 mg) by mouth every 8 hours 100 tablet 2     citalopram (CELEXA) 20 MG tablet Take 1 tablet (20 mg) by mouth daily 90 tablet 1     gabapentin (NEURONTIN) 300 MG capsule Take 1 tablet PO in the morning, 1 tablet PO midday, and 2 tablets PO at night. 180 capsule 0     lisinopril (PRINIVIL/ZESTRIL)  5 MG tablet Take 1 tablet (5 mg) by mouth daily 90 tablet 0     bisacodyl (DULCOLAX) 10 MG Suppository Place 1 suppository (10 mg) rectally daily as needed for constipation (Patient not taking: Reported on 12/17/2018) 25 suppository 1     cephALEXin (KEFLEX) 500 MG capsule Take 1 capsule (500 mg) by mouth 4 times daily (Patient not taking: Reported on 1/3/2019) 56 capsule 0     ferrous sulfate (IRON) 325 (65 Fe) MG tablet Take 1 tablet (325 mg) by mouth 2 times daily (Patient not taking: Reported on 1/3/2019) 180 tablet 0     HYDROcodone-acetaminophen (NORCO) 5-325 MG tablet Take 1 tablet by mouth At Bedtime for 25 days (Patient not taking: Reported on 1/3/2019) 25 tablet 0     lisinopril (PRINIVIL/ZESTRIL) 5 MG tablet Take 1 tablet (5 mg) by mouth daily (Patient not taking: Reported on 1/3/2019) 90 tablet 2     polyethylene glycol (MIRALAX/GLYCOLAX) Packet Take 17 g by mouth 2 times daily as needed (constipation) (Patient not taking: Reported on 12/17/2018) 200 packet 0     senna-docusate (SENOKOT-S;PERICOLACE) 8.6-50 MG per tablet Take 1 tablet by mouth 2 times daily as needed for constipation (Patient not taking: Reported on 1/3/2019) 60 tablet 2     Sulfamethoxazole-Trimethoprim (BACTRIM DS PO) Take 1 tablet by mouth 2 times daily         Allergies   Allergen Reactions     No Known Drug Allergies        REVIEW OF SYSTEMS:  CONSTITUTIONAL:  NEGATIVE for fever, chills, change in weight  INTEGUMENTARY/SKIN:  NEGATIVE for worrisome rashes, moles or lesions  EYES:  NEGATIVE for vision changes or irritation  ENT/MOUTH:  NEGATIVE for ear, mouth and throat problems  RESP:  NEGATIVE for significant cough or SOB  BREAST:  NEGATIVE for masses, tenderness or discharge  CV:  NEGATIVE for chest pain, palpitations or peripheral edema  GI:  NEGATIVE for nausea, abdominal pain, heartburn, or change in bowel habits  :  Negative   MUSCULOSKELETAL:  See HPI above  NEURO:  NEGATIVE for weakness, dizziness or  "paresthesias  ENDOCRINE:  NEGATIVE for temperature intolerance, skin/hair changes  HEME/ALLERGY/IMMUNE:  NEGATIVE for bleeding problems  PSYCHIATRIC:  NEGATIVE for changes in mood or affect      PHYSICAL EXAM:  /80   Ht 1.676 m (5' 6\")   Wt 130.2 kg (287 lb)   BMI 46.32 kg/m    Body mass index is 46.32 kg/m .   GENERAL APPEARANCE: healthy, alert and no distress   HEENT: No apparent thyroid megaly. Clear sclera with normal ocular movement  RESPIRATORY: No labored breathing  SKIN: no suspicious lesions or rashes  NEURO: Normal strength and tone, mentation intact and speech normal  VASCULAR: Good pulses, and capillary refill   LYMPH: no lymphadenopathy   PSYCH:  mentation appears normal and affect normal/bright    MUSCULOSKELETAL:  She was examined in a wheelchair  Significantly painful range of motion of the left hip  Right hip range of motion is not painful  Left hip range of motion is less than 70 degrees of flexion  No internal rotation of the left hip beyond neutral  Distal neurovascular status is intact  Peripheral edema     ASSESSMENT:  Severe left hip DJD  Status post right total of arthroplasty with subsequent infection    PLAN:  The x-rays of July 30, 2018 which was immediate postop were visualized.  She does have significant left hip DJD.  We obtained a new x-rays today to assess further progression as well as to obtain the lateral view which has not been done.  Even with improvement on the right hip following the operation, she is not ambulatory at this point.  She wants to proceed with total of arthroplasty despite associated risks in particular another possibility of drainage and infection.  To minimize exposure to nosocomial infection, early discharge home was felt to be appropriate.  She understands other potential risks of periprosthetic fracture, leg length inequality and thromboembolic events.  Left total of arthroplasty will be scheduled according to her convenience.      Imaging " Interpretation:    AP pelvis and lateral left hip x-rays from today demonstrate severe DJD with early femoral head collapse.  The joint space is completely obliterated.  Right total of arthroplasty components are intact.        MR left hip without contrast 7/1/2018 10:49 AM     Techniques: Multiplanar multisequence imaging of the left hip was  obtained without  administration of intra-articular or intravenous  contrast using routing protocol.     History: Osteoarthritis.     Comparison: Radiographs April 30, 2018     Findings:     Lack of signal-to-noise ratio secondary to patient body habitus  compromising assessment.     Osseous structures  Osseous structures: Severe degenerative change with associated  subchondral cysts and opposing marrow edema. There is associated areas  of subchondral bone plate contour deformities. Superior migration of  femoral head.     There is relatively more focal marrow edema at the femoral neck  medially, underlying stress reaction cannot be entirely excluded.     Articular cartilage and labrum  Assessment limited on this arthrographic study due to relative lack of  joint distension and additionally lack of signal-to-noise ratio.     Articular cartilage: Full-thickness chondral loss.     Labrum: Not well assessed.     Ligament teres and transverse ligament of acetabulum: Not well  assessed.     Joint or bursal effusion     Joint effusion: Small joint effusion with internal debris, some of  which may be ossified. Others are likely combination of chondroid and  synovial proliferative tissues.     Bursal effusion: Minimal nonspecific edema over the greater  trochanter. No substantial iliopsoas or trochanteric bursal effusion.     Muscles and tendons  Muscles and tendons: Not well assessed owing to relative lack of  signal-to-noise particularly on the fluid sensitive sequences. Diffuse  muscle fatty atrophy and infiltrations. Proximal rectus phil,  iliopsoas, gluteus minimus, medius and  hamstring tendons are grossly  intact on T1-weighted sequences.     Edema at the lateral aspect of the gluteus minimus, likely related to  muscle strain.     Nerves:  The visualized course of the sciatic nerve is unremarkable.     Other Findings:  None.                                                                      Impression:  Lack of signal-to-noise ratio compromising assessment.  1. Severe left hip osteoarthritis.  2. Relatively more focal marrow edema at the femoral neck medially,  underlying stress reaction cannot be entirely excluded.      Chavo Rodriguez MD  Department of Orthopedic Surgery        Disclaimer: This note consists of symbols derived from keyboarding, dictation and/or voice recognition software. As a result, there may be errors in the script that have gone undetected. Please consider this when interpreting information found in this chart.

## 2019-01-03 ENCOUNTER — TELEPHONE (OUTPATIENT)
Dept: ORTHOPEDICS | Facility: CLINIC | Age: 74
End: 2019-01-03

## 2019-01-03 ENCOUNTER — ANCILLARY PROCEDURE (OUTPATIENT)
Dept: GENERAL RADIOLOGY | Facility: CLINIC | Age: 74
End: 2019-01-03
Payer: COMMERCIAL

## 2019-01-03 ENCOUNTER — OFFICE VISIT (OUTPATIENT)
Dept: ORTHOPEDICS | Facility: CLINIC | Age: 74
End: 2019-01-03
Payer: COMMERCIAL

## 2019-01-03 VITALS
DIASTOLIC BLOOD PRESSURE: 80 MMHG | WEIGHT: 287 LBS | BODY MASS INDEX: 46.12 KG/M2 | SYSTOLIC BLOOD PRESSURE: 123 MMHG | HEIGHT: 66 IN

## 2019-01-03 DIAGNOSIS — M25.552 PAIN OF LEFT HIP JOINT: Primary | ICD-10-CM

## 2019-01-03 DIAGNOSIS — M16.12 PRIMARY OSTEOARTHRITIS OF LEFT HIP: ICD-10-CM

## 2019-01-03 DIAGNOSIS — M25.552 PAIN OF LEFT HIP JOINT: ICD-10-CM

## 2019-01-03 PROCEDURE — 73502 X-RAY EXAM HIP UNI 2-3 VIEWS: CPT | Performed by: ORTHOPAEDIC SURGERY

## 2019-01-03 PROCEDURE — 99214 OFFICE O/P EST MOD 30 MIN: CPT | Performed by: ORTHOPAEDIC SURGERY

## 2019-01-03 ASSESSMENT — MIFFLIN-ST. JEOR: SCORE: 1833.57

## 2019-01-03 NOTE — TELEPHONE ENCOUNTER
Scheduled surgery.     Type of surgery: left JENAE  Location of surgery: [facility]  Date and time of surgery: 2/12/19 @ 420pm  Surgeon: Jennifer  Pre-Op Appt Date: 1/28/19, Manoj Daley  Post-Op Appt Date: 2/22/19   Packet sent out: Yes  Pre-cert/Authorization completed:  Yes  Date: 1/3/2019      Cyn King, Surgery Scheduler

## 2019-01-03 NOTE — LETTER
1/3/2019         RE: Cristy Bailey  [address]  [facility] [address]        Dear Colleague,    Thank you for referring your patient, Cristy Bailey, to the [facility]TH[facility]ERY. Please see a copy of my visit note below.    HISTORY OF PRESENT ILLNESS:    Cristy Bailey is a 73 year old female who is seen as self referral for left hip pain and to establish care with Dr. Rodriguez. Patient would like to discuss left JENAE today.   Present symptoms: Reports insidious onset without acute precipitating event.. Location of Pain: left hip, nonradiating. Most of her pain is located bilateral lower leg, and feet. Duration of Pain: 1 year(s). Rating of Pain at worst: 7/10. Rating of Pain Currently: 5/10. Pain is better with: activity avoidance  Pain is worse with: standing and walking .  Associated symptoms: swelling marked in feet and lower legs.   Treatments tried to this point: Pain medication: acetaminophen (TYLENOL) and gabapentin (NEURONTIN).  Orthopedic PMH: MRI completed July 2018, right JENAE, Dr. Garcia, August 2018 .  Postoperative drainage which required additional irrigation and debridement and    Past Medical History:   Diagnosis Date     Arthritis     hip     HTN (hypertension) 1/2010      Leg weakness 3/24/2015     Lyme disease 1980'S AND 2004    lYME DZ LIKE SXS RESPONDED TO ABX       Past Surgical History:   Procedure Laterality Date     ARTHROPLASTY HIP Right 7/30/2018    Procedure: ARTHROPLASTY HIP;  Right total hip arthroplasty;  Surgeon: Bry Garcia MD;  Location:  OR     BIOPSY OF UTERUS LINING  3/2010    negative.      C C-SEC ONLY,PREV C-SEC      c-sec x 3      COLONOSCOPY  2/21/10    benign findings. advised 10 yr f/u.      INCISION AND DRAINAGE HIP, COMBINED Right 8/29/2018    Procedure: COMBINED INCISION AND DRAINAGE HIP;  Incision and debridement, right hip ;  Surgeon: Bry Garcia MD;  Location:  OR     ORTHOPEDIC SURGERY Right 08/29/2018     Right hip I & D 18, Right JENAE, DOS 2018, Dr. Garcia. [facility]       Family History   Problem Relation Age of Onset     Cerebrovascular Disease Father         -stroke at age 80     Family History Negative Mother          Diedn her 80's of unknown causes.  Pt otherwise unaware of her health hx.      Unknown/Adopted Mother      Diabetes Brother         (Christian)  of DM complications at age 50.     Alcohol/Drug Brother         Christian     C.A.D. Brother         Christian.      Heart Disease Brother         Christian--MI age 50.     Family History Negative Brother      Family History Negative Brother      Family History Negative Sister      Family History Negative Sister      Family History Negative Son      Family History Negative Daughter      Family History Negative Daughter        Social History     Socioeconomic History     Marital status:      Spouse name: Not on file     Number of children: 3     Years of education: Not on file     Highest education level: Not on file   Social Needs     Financial resource strain: Not on file     Food insecurity - worry: Not on file     Food insecurity - inability: Not on file     Transportation needs - medical: Not on file     Transportation needs - non-medical: Not on file   Occupational History     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 Yes   Social History Narrative     Not on file       Current Outpatient Medications   Medication Sig Dispense Refill     acetaminophen (TYLENOL) 325 MG tablet Take 3 tablets (975 mg) by mouth every 8 hours 100  tablet 2     citalopram (CELEXA) 20 MG tablet Take 1 tablet (20 mg) by mouth daily 90 tablet 1     gabapentin (NEURONTIN) 300 MG capsule Take 1 tablet PO in the morning, 1 tablet PO midday, and 2 tablets PO at night. 180 capsule 0     lisinopril (PRINIVIL/ZESTRIL) 5 MG tablet Take 1 tablet (5 mg) by mouth daily 90 tablet 0     bisacodyl (DULCOLAX) 10 MG Suppository Place 1 suppository (10 mg) rectally daily as needed for constipation (Patient not taking: Reported on 12/17/2018) 25 suppository 1     cephALEXin (KEFLEX) 500 MG capsule Take 1 capsule (500 mg) by mouth 4 times daily (Patient not taking: Reported on 1/3/2019) 56 capsule 0     ferrous sulfate (IRON) 325 (65 Fe) MG tablet Take 1 tablet (325 mg) by mouth 2 times daily (Patient not taking: Reported on 1/3/2019) 180 tablet 0     HYDROcodone-acetaminophen (NORCO) 5-325 MG tablet Take 1 tablet by mouth At Bedtime for 25 days (Patient not taking: Reported on 1/3/2019) 25 tablet 0     lisinopril (PRINIVIL/ZESTRIL) 5 MG tablet Take 1 tablet (5 mg) by mouth daily (Patient not taking: Reported on 1/3/2019) 90 tablet 2     polyethylene glycol (MIRALAX/GLYCOLAX) Packet Take 17 g by mouth 2 times daily as needed (constipation) (Patient not taking: Reported on 12/17/2018) 200 packet 0     senna-docusate (SENOKOT-S;PERICOLACE) 8.6-50 MG per tablet Take 1 tablet by mouth 2 times daily as needed for constipation (Patient not taking: Reported on 1/3/2019) 60 tablet 2     Sulfamethoxazole-Trimethoprim (BACTRIM DS PO) Take 1 tablet by mouth 2 times daily         Allergies   Allergen Reactions     No Known Drug Allergies        REVIEW OF SYSTEMS:  CONSTITUTIONAL:  NEGATIVE for fever, chills, change in weight  INTEGUMENTARY/SKIN:  NEGATIVE for worrisome rashes, moles or lesions  EYES:  NEGATIVE for vision changes or irritation  ENT/MOUTH:  NEGATIVE for ear, mouth and throat problems  RESP:  NEGATIVE for significant cough or SOB  BREAST:  NEGATIVE for masses, tenderness or  "discharge  CV:  NEGATIVE for chest pain, palpitations or peripheral edema  GI:  NEGATIVE for nausea, abdominal pain, heartburn, or change in bowel habits  :  Negative   MUSCULOSKELETAL:  See HPI above  NEURO:  NEGATIVE for weakness, dizziness or paresthesias  ENDOCRINE:  NEGATIVE for temperature intolerance, skin/hair changes  HEME/ALLERGY/IMMUNE:  NEGATIVE for bleeding problems  PSYCHIATRIC:  NEGATIVE for changes in mood or affect      PHYSICAL EXAM:  /80   Ht 1.676 m (5' 6\")   Wt 130.2 kg (287 lb)   BMI 46.32 kg/m     Body mass index is 46.32 kg/m .   GENERAL APPEARANCE: healthy, alert and no distress   HEENT: No apparent thyroid megaly. Clear sclera with normal ocular movement  RESPIRATORY: No labored breathing  SKIN: no suspicious lesions or rashes  NEURO: Normal strength and tone, mentation intact and speech normal  VASCULAR: Good pulses, and capillary refill   LYMPH: no lymphadenopathy   PSYCH:  mentation appears normal and affect normal/bright    MUSCULOSKELETAL:  She was examined in a wheelchair  Significantly painful range of motion of the left hip  Right hip range of motion is not painful  Left hip range of motion is less than 70 degrees of flexion  No internal rotation of the left hip beyond neutral  Distal neurovascular status is intact  Peripheral edema     ASSESSMENT:  Severe left hip DJD  Status post right total of arthroplasty with subsequent infection    PLAN:  The x-rays of July 30, 2018 which was immediate postop were visualized.  She does have significant left hip DJD.  We obtained a new x-rays today to assess further progression as well as to obtain the lateral view which has not been done.  Even with improvement on the right hip following the operation, she is not ambulatory at this point.  She wants to proceed with total of arthroplasty despite associated risks in particular another possibility of drainage and infection.  To minimize exposure to nosocomial infection, early discharge " home was felt to be appropriate.  She understands other potential risks of periprosthetic fracture, leg length inequality and thromboembolic events.  Left total of arthroplasty will be scheduled according to her convenience.      Imaging Interpretation:    AP pelvis and lateral left hip x-rays from today demonstrate severe DJD with early femoral head collapse.  The joint space is completely obliterated.  Right total of arthroplasty components are intact.        MR left hip without contrast 7/1/2018 10:49 AM     Techniques: Multiplanar multisequence imaging of the left hip was  obtained without  administration of intra-articular or intravenous  contrast using routing protocol.     History: Osteoarthritis.     Comparison: Radiographs April 30, 2018     Findings:     Lack of signal-to-noise ratio secondary to patient body habitus  compromising assessment.     Osseous structures  Osseous structures: Severe degenerative change with associated  subchondral cysts and opposing marrow edema. There is associated areas  of subchondral bone plate contour deformities. Superior migration of  femoral head.     There is relatively more focal marrow edema at the femoral neck  medially, underlying stress reaction cannot be entirely excluded.     Articular cartilage and labrum  Assessment limited on this arthrographic study due to relative lack of  joint distension and additionally lack of signal-to-noise ratio.     Articular cartilage: Full-thickness chondral loss.     Labrum: Not well assessed.     Ligament teres and transverse ligament of acetabulum: Not well  assessed.     Joint or bursal effusion     Joint effusion: Small joint effusion with internal debris, some of  which may be ossified. Others are likely combination of chondroid and  synovial proliferative tissues.     Bursal effusion: Minimal nonspecific edema over the greater  trochanter. No substantial iliopsoas or trochanteric bursal effusion.     Muscles and  tendons  Muscles and tendons: Not well assessed owing to relative lack of  signal-to-noise particularly on the fluid sensitive sequences. Diffuse  muscle fatty atrophy and infiltrations. Proximal rectus phil,  iliopsoas, gluteus minimus, medius and hamstring tendons are grossly  intact on T1-weighted sequences.     Edema at the lateral aspect of the gluteus minimus, likely related to  muscle strain.     Nerves:  The visualized course of the sciatic nerve is unremarkable.     Other Findings:  None.                                                                      Impression:  Lack of signal-to-noise ratio compromising assessment.  1. Severe left hip osteoarthritis.  2. Relatively more focal marrow edema at the femoral neck medially,  underlying stress reaction cannot be entirely excluded.      Chavo Rodriguez MD  Department of Orthopedic Surgery        Disclaimer: This note consists of symbols derived from keyboarding, dictation and/or voice recognition software. As a result, there may be errors in the script that have gone undetected. Please consider this when interpreting information found in this chart.      Again, thank you for allowing me to participate in the care of your patient.        Sincerely,        Chavo Rodriguez MD

## 2019-01-04 ENCOUNTER — PATIENT OUTREACH (OUTPATIENT)
Dept: CARE COORDINATION | Facility: CLINIC | Age: 74
End: 2019-01-04

## 2019-01-04 ASSESSMENT — ACTIVITIES OF DAILY LIVING (ADL): DEPENDENT_IADLS:: COOKING;CLEANING;LAUNDRY;SHOPPING;MEAL PREPARATION;TRANSPORTATION

## 2019-01-04 NOTE — PROGRESS NOTES
Clinic Care Coordination Contact    Clinic Care Coordination Contact  OUTREACH    Clinical Concerns:  Current Medical Concerns:  Met with new surgeon and he agreed to schedule a surgery to repair the second hip.  She did have complications last year, but the pain she is experiencing with the non repaired hip, make her want to get an intervention. She wants to walk again.       Education Provided to patient: Discussed using TCU versus returning directly home after hospitalization. She agrees that going to TCU will give Leo a break and she will have RN oversight. Is worried that if she has infection after surgery, she may not realize it.  She is willing to go to TCU again if she is not strong enough to return home without rehab.          Goals:   Goals        General    Functional (pt-stated)     Notes - Note created  1/4/2019  2:56 PM by Francisca Hernandez LSW    Goal Statement: I will have successful hip replacement and will be able to walk.  Measure of Success: I will work with surgeon and therapists.  Supportive Steps to Achieve: I will work on getting stronger prior to surgery.    Barriers: Has ongoing pain.  High anxiety.  Strengths: Wants to improve and be able to walk.  Has partner who is supportive.   Date to Achieve By: May 1, 2019.  Patient expressed understanding of goal: yes              Patient/Caregiver understanding: Is able to put weight on repaired hip and hopes that having two repaired hips, will allow her to walk without pain.  Appreciates the support from CC.      Outreach Frequency: monthly  Future Appointments              In 3 weeks Manoj Daley PA-C [facility]    In 1 month Donal Grover PA-C [facility] [place] [facility]          Plan: Patient will call CC if needs arise.  CC to call in one month to provide support.     Francisca Hernandez,   [facility]  [email or web address]  [phone]

## 2019-01-07 ENCOUNTER — MYC REFILL (OUTPATIENT)
Dept: ORTHOPEDICS | Facility: CLINIC | Age: 74
End: 2019-01-07

## 2019-01-07 DIAGNOSIS — M79.605 BILATERAL LEG PAIN: ICD-10-CM

## 2019-01-07 DIAGNOSIS — R29.898 WEAKNESS OF BOTH LOWER EXTREMITIES: ICD-10-CM

## 2019-01-07 DIAGNOSIS — M79.604 BILATERAL LEG PAIN: ICD-10-CM

## 2019-01-07 RX ORDER — GABAPENTIN 300 MG/1
CAPSULE ORAL
Qty: 180 CAPSULE | Refills: 0 | OUTPATIENT
Start: 2019-01-07

## 2019-01-09 ENCOUNTER — MYC REFILL (OUTPATIENT)
Dept: ORTHOPEDICS | Facility: CLINIC | Age: 74
End: 2019-01-09

## 2019-01-09 ENCOUNTER — MYC REFILL (OUTPATIENT)
Dept: FAMILY MEDICINE | Facility: CLINIC | Age: 74
End: 2019-01-09

## 2019-01-09 DIAGNOSIS — M79.605 BILATERAL LEG PAIN: ICD-10-CM

## 2019-01-09 DIAGNOSIS — M79.604 BILATERAL LEG PAIN: ICD-10-CM

## 2019-01-09 DIAGNOSIS — R29.898 WEAKNESS OF BOTH LOWER EXTREMITIES: ICD-10-CM

## 2019-01-10 RX ORDER — GABAPENTIN 300 MG/1
CAPSULE ORAL
Qty: 180 CAPSULE | Refills: 1 | Status: SHIPPED | OUTPATIENT
Start: 2019-01-10 | End: 2019-05-01

## 2019-01-10 NOTE — TELEPHONE ENCOUNTER
"Per 11/28/18 telephone encounter:     \"Called Cristy to inform her that David Grover PA-C will refill her Gabapentin for 1 fill, and recommends having PCP take over Rx for long term management of neuropathy, and chronic leg pains.    She was agreeable to the plan to have PCP take over Gabapentin regimen after this fill and will message her PCP.    Keira Santiago, ATC \"    Phone call to patient and informed of the above.   She states [employer] did not allow her to send it to PCP since David Grover PA-C was the one who prescribed it last. Informed will route to Kulwant Daley's office. Asked that in the future if it happens with another medication, to please call the office instead of sending a [employer] message.   She verbalized understanding.     Routed to Az Javed RN        "

## 2019-01-10 NOTE — TELEPHONE ENCOUNTER
Routing refill request to provider for review/approval because:  Drug not on the [facility] refill protocol     See below note as well.     Mariella La RN -- [facility] Workforce

## 2019-01-11 NOTE — TELEPHONE ENCOUNTER
Norco 5-325 mg       Last Written Prescription Date:  12/17/18  Last Fill Quantity: 25,   # refills: 0  Last Office Visit: Virtual visit 12/17/18  Future Office visit:    Next 5 appointments (look out 90 days)    Jan 28, 2019 10:00 AM CST  Pre-Op physical with Manoj Daley PA-C  [facility] [address] [place] [id number] [phone]   Feb 22, 2019 10:00 AM CST  Return Visit with Donal Grover PA-C  [facility] ([facility] [place] [facility]) [address] [address] [address] [phone]           Routing refill request to provider for review/approval because:  Drug not on the [facility], [facility] or  Health refill protocol or controlled substance    Teri WHITE RN

## 2019-01-15 RX ORDER — HYDROCODONE BITARTRATE AND ACETAMINOPHEN 5; 325 MG/1; MG/1
1 TABLET ORAL AT BEDTIME
Qty: 25 TABLET | Refills: 0 | Status: SHIPPED | OUTPATIENT
Start: 2019-01-15 | End: 2019-01-29

## 2019-01-25 NOTE — PROGRESS NOTES
[facility]  [id number] [facility] [id number] [phone]  Dept: [phone]    PRE-OP EVALUATION:  Today's date: 2019    Cristy Bailey (: 1945) presents for pre-operative evaluation assessment as requested by Dr. Rodriguez. She requires evaluation and anesthesia risk assessment prior to undergoing surgery/procedure for treatment of Left hip replacement.    Primary Physician: Manoj Daley  Type of Anesthesia Anticipated: to be determined    Patient has a Health Care Directive or Living Will:  NO    Preop Questions 2019   Who is doing your surgery? Dr. Rodriguez   What are you having done? Left hip replacement   Date of Surgery/Procedure: 19   Facility or Hospital where procedure/surgery will be performed: [facility]   1.  Do you have a history of Heart attack, stroke, stent, coronary bypass surgery, or other heart surgery? No   2.  Do you ever have any pain or discomfort in your chest? No   3.  Do you have a history of  Heart Failure? No   4.   Are you troubled by shortness of breath when:  walking on a level surface, or up a slight hill, or at night? No   5.  Do you currently have a cold, bronchitis or other respiratory infection? No   6.  Do you have a cough, shortness of breath, or wheezing? No   7.  Do you sometimes get pains in the calves of your legs when you walk? UNKNOWN - not currently walking   8. Do you or anyone in your family have previous history of blood clots? No   9.  Do you or does anyone in your family have a serious bleeding problem such as prolonged bleeding following surgeries or cuts? No   10. Have you ever had problems with anemia or been told to take iron pills? No   11. Have you had any abnormal blood loss such as black, tarry or bloody stools, or abnormal vaginal bleeding? No   12. Have you ever had a blood transfusion? No   13. Have you or any of your relatives ever had problems with anesthesia? No   14. Do you have sleep apnea,  excessive snoring or daytime drowsiness? No   15. Do you have any prosthetic heart valves? No   16. Do you have prosthetic joints? YES - right total hip in Summer 2018   17. Is there any chance that you may be pregnant? No         HPI:     HPI related to upcoming procedure: chronic lower extremity/hip pain (insidiuous with no precipitating events) along with lower extremity weakness. Xrays reveal severe left hip osteoarthritis     She feels well otherwise today from a health stand point    See problem list for active medical problems.  Problems all longstanding and stable, except as noted/documented.  See ROS for pertinent symptoms related to these conditions.                                                                                                                                                          .    MEDICAL HISTORY:     Patient Active Problem List    Diagnosis Date Noted     Hip osteoarthritis 07/30/2018     Priority: Medium     Avascular necrosis of bone of hip, right (H) 07/28/2018     Priority: Medium     Morbid obesity (H) 09/22/2017     Priority: Medium     Constipation 03/25/2015     Priority: Medium     Problem list name updated by automated process. Provider to review       Myalgia and myositis 03/25/2015     Priority: Medium     Problem list name updated by automated process. Provider to review       Leg weakness 03/24/2015     Priority: Medium     Constipation 04/01/2014     Priority: Medium     HTN, goal below 140/90 07/30/2013     Priority: Medium     Health Care Home 05/18/2012     Priority: Medium     FPA [employer] for .  Amy Wray RN-BSN, [facility]  [phone]   DX V65.8 REPLACED WITH [id number]ALTH CARE HOME (04/08/2013)       CRP elevated 05/17/2012     Priority: Medium     Vitamin D deficiency 05/17/2012     Priority: Medium     Leg weakness, bilateral 05/13/2012     Priority: Medium     Hyperlipidemia LDL goal <160 08/02/2011     Priority: Medium     Abnormal  glucose 2011     Priority: Medium     IMO update changed this record. Please review for accuracy       Obesity 2011     Priority: Medium     HTN (hypertension) 2010     Priority: Medium      Past Medical History:   Diagnosis Date     Arthritis     hip     HTN (hypertension) 2010      Leg weakness 3/24/2015     Lyme disease  AND     lYME DZ LIKE SXS RESPONDED TO ABX     Past Surgical History:   Procedure Laterality Date     ARTHROPLASTY HIP Right 2018    Procedure: ARTHROPLASTY HIP;  Right total hip arthroplasty;  Surgeon: Bry Garcia MD;  Location: [facility] OR     BIOPSY OF UTERUS LINING  3/2010    negative.      C C-SEC ONLY,PREV C-SEC      c-sec x 3      COLONOSCOPY  2/21/10    benign findings. advised 10 yr f/u.      INCISION AND DRAINAGE HIP, COMBINED Right 2018    Procedure: COMBINED INCISION AND DRAINAGE HIP;  Incision and debridement, right hip ;  Surgeon: Bry Garcia MD;  Location: [facility] OR     ORTHOPEDIC SURGERY Right 2018    Right hip I & D 18, Right JENAE, DOS 2018, Dr. Garcia. [facility]     Current Outpatient Medications   Medication Sig Dispense Refill     acetaminophen (TYLENOL) 325 MG tablet Take 3 tablets (975 mg) by mouth every 8 hours 100 tablet 2     gabapentin (NEURONTIN) 300 MG capsule Take 1 tablet PO in the morning, 1 tablet PO midday, and 2 tablets PO at night. 180 capsule 1     citalopram (CELEXA) 20 MG tablet Take 20 mg by mouth daily       mupirocin (BACTROBAN) 2 % nasal ointment Spray 1 g into both nostrils 2 times daily Apply small amount in each nostril twice daily for seven days prior to surgery.       OTC products: None, except as noted above    Allergies   Allergen Reactions     No Known Drug Allergies       Latex Allergy: NO    Social History     Tobacco Use     Smoking status: Former Smoker     Last attempt to quit: 1984     Years since quittin.5     Smokeless tobacco: Never Used   Substance  "Use Topics     Alcohol use: No     History   Drug Use No       REVIEW OF SYSTEMS:   Constitutional, neuro, ENT, endocrine, pulmonary, cardiac, gastrointestinal, genitourinary, musculoskeletal, integument and psychiatric systems are negative, except as otherwise noted.    EXAM:   /83   Pulse 75   Temp 98.1  F (36.7  C) (Tympanic)   Resp 12   Ht 1.676 m (5' 6\")   SpO2 98%   BMI 46.32 kg/m      GENERAL APPEARANCE: healthy, alert and no distress     EYES: EOMI, PERRL     HENT: ear canals and TM's normal and nose and mouth without ulcers or lesions     NECK: no adenopathy, no asymmetry, masses, or scars and thyroid normal to palpation     RESP: lungs clear to auscultation - no rales, rhonchi or wheezes     CV: regular rates and rhythm, normal S1 S2, no S3 or S4 and no murmur, click or rub     ABDOMEN: soft, non-tender     MS: patient is in wheel chair for exam. There is bilateral LE pitting 1-2+. Palpation of the low extremities cause pain. Distal pulse intact. Limited hip exam     SKIN: no suspicious lesions or rashes     NEURO: mentation intact     PSYCH: variable; pleasant and conversant but easily tearful; affect normal    DIAGNOSTICS:   EKG: Normal Sinus Rhythm, unchanged from previous tracings  LABS: see lab tab for results.   Hemoglobin  Serum Potassium  Serum Creatinine  MRSA/MSSA screening for elective joint replacement surgery    Recent Labs   Lab Test 08/30/18  0621 08/29/18  1028 08/02/18  0633  08/01/18  0737  07/30/18  1353 07/28/18  1028  03/24/15  0827  05/03/12  0837   HGB 9.6* 9.8* 8.8*   < > 8.9*   < >  --  14.6   < >  --    < >  --      --  188  --   --   --  153  --    < >  --    < >  --    NA  --   --   --   --  139  --   --  143   < > 142   < > 139   POTASSIUM  --   --   --   --  3.8  --   --  4.0   < > 4.8   < > 4.4   CR  --   --   --   --  0.69  --  0.60 0.60   < > 0.67   < > 0.64   A1C  --   --   --   --   --   --   --   --   --  5.9  --  5.8    < > = values in this interval " not displayed.      IMPRESSION:   Reason for surgery/procedure: severe DJD of the left hip  Diagnosis/reason for consult: Pre-operative examination    The proposed surgical procedure is considered INTERMEDIATE risk.    REVISED CARDIAC RISK INDEX  The patient has the following serious cardiovascular risks for perioperative complications such as (MI, PE, VFib and 3  AV Block):  No serious cardiac risks  INTERPRETATION: 0 risks: Class I (very low risk - 0.4% complication rate)    The patient has the following additional risks for perioperative complications:  High tolerance to opioid analgesics due to chronic use  Morbid obesity      ICD-10-CM    1. Preop general physical exam Z01.818 EKG 12-lead complete w/read - Clinics   2. Primary osteoarthritis of left hip M16.12    3. Weakness of both lower extremities R29.898    4. HTN, goal below 140/90 I10 Hemoglobin     Basic metabolic panel     EKG 12-lead complete w/read - Clinics   5. Pre-operative laboratory examination Z01.812 Methicillin Resistant Staph Aureus PCR   6. Morbid obesity (H) E66.01        RECOMMENDATIONS:     --Consult hospital rounder / IM to assist post-op medical management    --Patient is to hold all scheduled medications on the day of surgery    --Reviewed nsaids/asa use prior to surgery    --Pain medications, time off from work and FMLA following surgery deferred to surgeon.      APPROVAL GIVEN to proceed with proposed procedure, without further diagnostic evaluation       Signed Electronically by: Mnaoj Daley PA-C    Copy of this evaluation report is provided to requesting physician.    [place] Preop Guidelines    Revised Cardiac Risk Index

## 2019-01-28 ENCOUNTER — OFFICE VISIT (OUTPATIENT)
Dept: FAMILY MEDICINE | Facility: CLINIC | Age: 74
End: 2019-01-28
Payer: COMMERCIAL

## 2019-01-28 VITALS
SYSTOLIC BLOOD PRESSURE: 121 MMHG | DIASTOLIC BLOOD PRESSURE: 83 MMHG | HEART RATE: 75 BPM | OXYGEN SATURATION: 98 % | TEMPERATURE: 98.1 F | BODY MASS INDEX: 46.32 KG/M2 | RESPIRATION RATE: 12 BRPM | HEIGHT: 66 IN

## 2019-01-28 DIAGNOSIS — E66.01 MORBID OBESITY (H): ICD-10-CM

## 2019-01-28 DIAGNOSIS — Z01.818 PREOP GENERAL PHYSICAL EXAM: Primary | ICD-10-CM

## 2019-01-28 DIAGNOSIS — R29.898 WEAKNESS OF BOTH LOWER EXTREMITIES: ICD-10-CM

## 2019-01-28 DIAGNOSIS — M16.12 PRIMARY OSTEOARTHRITIS OF LEFT HIP: ICD-10-CM

## 2019-01-28 DIAGNOSIS — I10 HTN, GOAL BELOW 140/90: ICD-10-CM

## 2019-01-28 DIAGNOSIS — Z01.812 PRE-OPERATIVE LABORATORY EXAMINATION: ICD-10-CM

## 2019-01-28 LAB
HGB BLD-MCNC: 14.2 G/DL (ref 11.7–15.7)
MRSA DNA SPEC QL NAA+PROBE: NEGATIVE
SPECIMEN SOURCE: NORMAL

## 2019-01-28 PROCEDURE — 80048 BASIC METABOLIC PNL TOTAL CA: CPT | Performed by: PHYSICIAN ASSISTANT

## 2019-01-28 PROCEDURE — 36415 COLL VENOUS BLD VENIPUNCTURE: CPT | Performed by: PHYSICIAN ASSISTANT

## 2019-01-28 PROCEDURE — 99215 OFFICE O/P EST HI 40 MIN: CPT | Performed by: PHYSICIAN ASSISTANT

## 2019-01-28 PROCEDURE — 85018 HEMOGLOBIN: CPT | Performed by: PHYSICIAN ASSISTANT

## 2019-01-28 PROCEDURE — 93000 ELECTROCARDIOGRAM COMPLETE: CPT | Performed by: PHYSICIAN ASSISTANT

## 2019-01-28 PROCEDURE — 87640 STAPH A DNA AMP PROBE: CPT | Mod: 59 | Performed by: ORTHOPAEDIC SURGERY

## 2019-01-28 PROCEDURE — 87641 MR-STAPH DNA AMP PROBE: CPT | Performed by: ORTHOPAEDIC SURGERY

## 2019-01-29 LAB
ANION GAP SERPL CALCULATED.3IONS-SCNC: 4 MMOL/L (ref 3–14)
BUN SERPL-MCNC: 20 MG/DL (ref 7–30)
CALCIUM SERPL-MCNC: 9.1 MG/DL (ref 8.5–10.1)
CHLORIDE SERPL-SCNC: 107 MMOL/L (ref 94–109)
CO2 SERPL-SCNC: 29 MMOL/L (ref 20–32)
CREAT SERPL-MCNC: 0.67 MG/DL (ref 0.52–1.04)
GFR SERPL CREATININE-BSD FRML MDRD: 88 ML/MIN/{1.73_M2}
GLUCOSE SERPL-MCNC: 99 MG/DL (ref 70–99)
POTASSIUM SERPL-SCNC: 4.2 MMOL/L (ref 3.4–5.3)
SODIUM SERPL-SCNC: 140 MMOL/L (ref 133–144)

## 2019-01-29 RX ORDER — CITALOPRAM HYDROBROMIDE 20 MG/1
20 TABLET ORAL DAILY
COMMUNITY
End: 2019-08-30

## 2019-01-29 NOTE — PROVIDER NOTIFICATION
Nasal screen results MRSA negative, MSSA positive. Results called to Cyn at Dr. Rodriguez's office, prescription for Mupiricin called into pharmacy of choice. Patient notified of extra showering and instructions given for Mupiricin ointment, denies questions.

## 2019-01-29 NOTE — TELEPHONE ENCOUNTER
Surgery time moved up due to a cancellation.     Left message for patient to notify of surgery time changed. Time moved up from 420p to 230p.  Arrival time of 2pm.        Cyn King, Surgery Scheduler

## 2019-02-06 ENCOUNTER — PATIENT OUTREACH (OUTPATIENT)
Dept: CARE COORDINATION | Facility: CLINIC | Age: 74
End: 2019-02-06

## 2019-02-06 NOTE — PROGRESS NOTES
Clinic Care Coordination Contact    Clinic Care Coordination Contact  OUTREACH     Goals:   Goals        General    Functional (pt-stated)     Notes - Note created  1/4/2019  2:56 PM by Francisca Hernandez LSW    Goal Statement: I will have successful hip replacement and will be able to walk.  Measure of Success: I will work with surgeon and therapists.  Supportive Steps to Achieve: I will work on getting stronger prior to surgery.    Barriers: Has ongoing pain.  High anxiety.  Strengths: Wants to improve and be able to walk.  Has partner who is supportive.   Date to Achieve By: May 1, 2019.  Patient expressed understanding of goal: yes          Patient/Caregiver understanding: Had her preop physical with PA.  Is nervous about the surgery, but is hopeful she will be able to stand afterwards. Discussed after care including a TCU or home care.  She will work with hospital staff to see if she needs to go to TCU or can discharge home with home care.     Outreach Frequency: monthly  Future Appointments              In 2 weeks Donal Grover PA-C [facility] ORTHOPEDIC SURGERY, [facility] [place]          Plan: CC to contact upon discharge from hospital.      Francisca Hernandez,   [facility]  [email or web address]  [phone]

## 2019-02-11 NOTE — PHARMACY-ADMISSION MEDICATION HISTORY
Admission medication history interview status for this patient is complete. See [facility] admission navigator for allergy information, prior to admission medications and immunization status.       PTA meds completed by pre-admitting nurse Leana Ma and reviewed by pharmacy         Prior to Admission medications    Medication Sig Last Dose Taking? Auth Provider   acetaminophen (TYLENOL) 325 MG tablet Take 3 tablets (975 mg) by mouth every 8 hours  Yes Manoj Daley PA-C   citalopram (CELEXA) 20 MG tablet Take 20 mg by mouth daily  Yes Reported, Patient   gabapentin (NEURONTIN) 300 MG capsule Take 1 tablet PO in the morning, 1 tablet PO midday, and 2 tablets PO at night.  Yes Manoj Daley PA-C   mupirocin (BACTROBAN) 2 % nasal ointment Spray 1 g into both nostrils 2 times daily Apply small amount in each nostril twice daily for seven days prior to surgery.  Yes Reported, Patient

## 2019-02-12 ENCOUNTER — ANESTHESIA EVENT (OUTPATIENT)
Dept: SURGERY | Facility: CLINIC | Age: 74
DRG: 470 | End: 2019-02-12
Payer: COMMERCIAL

## 2019-02-12 ENCOUNTER — HOSPITAL ENCOUNTER (INPATIENT)
Facility: CLINIC | Age: 74
LOS: 3 days | Discharge: NURSING, REHAB OR LONG-TERM CARE | DRG: 470 | End: 2019-02-15
Attending: ORTHOPAEDIC SURGERY | Admitting: ORTHOPAEDIC SURGERY
Payer: COMMERCIAL

## 2019-02-12 ENCOUNTER — ANESTHESIA (OUTPATIENT)
Dept: SURGERY | Facility: CLINIC | Age: 74
DRG: 470 | End: 2019-02-12
Payer: COMMERCIAL

## 2019-02-12 ENCOUNTER — APPOINTMENT (OUTPATIENT)
Dept: GENERAL RADIOLOGY | Facility: CLINIC | Age: 74
DRG: 470 | End: 2019-02-12
Attending: ORTHOPAEDIC SURGERY
Payer: COMMERCIAL

## 2019-02-12 DIAGNOSIS — K59.03 CONSTIPATION DUE TO PAIN MEDICATION: Primary | ICD-10-CM

## 2019-02-12 DIAGNOSIS — Z96.642 STATUS POST TOTAL REPLACEMENT OF LEFT HIP: ICD-10-CM

## 2019-02-12 PROBLEM — Z96.649 STATUS POST THR (TOTAL HIP REPLACEMENT): Status: ACTIVE | Noted: 2019-02-12

## 2019-02-12 LAB
CREAT SERPL-MCNC: 0.71 MG/DL (ref 0.52–1.04)
GFR SERPL CREATININE-BSD FRML MDRD: 85 ML/MIN/{1.73_M2}
PLATELET # BLD AUTO: 195 10E9/L (ref 150–450)

## 2019-02-12 PROCEDURE — 36000093 ZZH SURGERY LEVEL 4 1ST 30 MIN: Performed by: ORTHOPAEDIC SURGERY

## 2019-02-12 PROCEDURE — 25000125 ZZHC RX 250: Performed by: NURSE ANESTHETIST, CERTIFIED REGISTERED

## 2019-02-12 PROCEDURE — 40000306 ZZH STATISTIC PRE PROC ASSESS II: Performed by: ORTHOPAEDIC SURGERY

## 2019-02-12 PROCEDURE — C1776 JOINT DEVICE (IMPLANTABLE): HCPCS | Performed by: ORTHOPAEDIC SURGERY

## 2019-02-12 PROCEDURE — 71000013 ZZH RECOVERY PHASE 1 LEVEL 1 EA ADDTL HR: Performed by: ORTHOPAEDIC SURGERY

## 2019-02-12 PROCEDURE — 12000000 ZZH R&B MED SURG/OB

## 2019-02-12 PROCEDURE — 40000986 XR PELVIS AND HIP PORTABLE LEFT 1 VIEW

## 2019-02-12 PROCEDURE — 27130 TOTAL HIP ARTHROPLASTY: CPT | Mod: LT | Performed by: ORTHOPAEDIC SURGERY

## 2019-02-12 PROCEDURE — 25000128 H RX IP 250 OP 636: Performed by: ANESTHESIOLOGY

## 2019-02-12 PROCEDURE — 37000009 ZZH ANESTHESIA TECHNICAL FEE, EACH ADDTL 15 MIN: Performed by: ORTHOPAEDIC SURGERY

## 2019-02-12 PROCEDURE — 0SRB02A REPLACEMENT OF LEFT HIP JOINT WITH METAL ON POLYETHYLENE SYNTHETIC SUBSTITUTE, UNCEMENTED, OPEN APPROACH: ICD-10-PCS | Performed by: ORTHOPAEDIC SURGERY

## 2019-02-12 PROCEDURE — 25000128 H RX IP 250 OP 636: Performed by: NURSE ANESTHETIST, CERTIFIED REGISTERED

## 2019-02-12 PROCEDURE — 36415 COLL VENOUS BLD VENIPUNCTURE: CPT | Performed by: ORTHOPAEDIC SURGERY

## 2019-02-12 PROCEDURE — 25000132 ZZH RX MED GY IP 250 OP 250 PS 637: Performed by: ORTHOPAEDIC SURGERY

## 2019-02-12 PROCEDURE — 25000125 ZZHC RX 250: Performed by: ORTHOPAEDIC SURGERY

## 2019-02-12 PROCEDURE — 25800030 ZZH RX IP 258 OP 636: Performed by: ANESTHESIOLOGY

## 2019-02-12 PROCEDURE — 27130 TOTAL HIP ARTHROPLASTY: CPT | Mod: AS | Performed by: PHYSICIAN ASSISTANT

## 2019-02-12 PROCEDURE — 27210794 ZZH OR GENERAL SUPPLY STERILE: Performed by: ORTHOPAEDIC SURGERY

## 2019-02-12 PROCEDURE — 25800030 ZZH RX IP 258 OP 636: Performed by: NURSE ANESTHETIST, CERTIFIED REGISTERED

## 2019-02-12 PROCEDURE — 25000132 ZZH RX MED GY IP 250 OP 250 PS 637: Performed by: PHYSICIAN ASSISTANT

## 2019-02-12 PROCEDURE — 25000128 H RX IP 250 OP 636: Performed by: PHYSICIAN ASSISTANT

## 2019-02-12 PROCEDURE — 25800030 ZZH RX IP 258 OP 636: Performed by: ORTHOPAEDIC SURGERY

## 2019-02-12 PROCEDURE — 25000128 H RX IP 250 OP 636: Performed by: ORTHOPAEDIC SURGERY

## 2019-02-12 PROCEDURE — C1713 ANCHOR/SCREW BN/BN,TIS/BN: HCPCS | Performed by: ORTHOPAEDIC SURGERY

## 2019-02-12 PROCEDURE — 37000008 ZZH ANESTHESIA TECHNICAL FEE, 1ST 30 MIN: Performed by: ORTHOPAEDIC SURGERY

## 2019-02-12 PROCEDURE — 36000063 ZZH SURGERY LEVEL 4 EA 15 ADDTL MIN: Performed by: ORTHOPAEDIC SURGERY

## 2019-02-12 PROCEDURE — 25000125 ZZHC RX 250: Performed by: PHYSICIAN ASSISTANT

## 2019-02-12 PROCEDURE — 25800025 ZZH RX 258: Performed by: ORTHOPAEDIC SURGERY

## 2019-02-12 PROCEDURE — 71000012 ZZH RECOVERY PHASE 1 LEVEL 1 FIRST HR: Performed by: ORTHOPAEDIC SURGERY

## 2019-02-12 PROCEDURE — 85049 AUTOMATED PLATELET COUNT: CPT | Performed by: ORTHOPAEDIC SURGERY

## 2019-02-12 PROCEDURE — 82565 ASSAY OF CREATININE: CPT | Performed by: ORTHOPAEDIC SURGERY

## 2019-02-12 DEVICE — IMPLANTABLE DEVICE: Type: IMPLANTABLE DEVICE | Site: HIP | Status: FUNCTIONAL

## 2019-02-12 DEVICE — IMP HEAD FEMORAL SNR COBALT 36MM +0 71303600: Type: IMPLANTABLE DEVICE | Site: HIP | Status: FUNCTIONAL

## 2019-02-12 DEVICE — IMP SCR ACET SNN SPHERICAL HEAD 6.5X25MM 71332525: Type: IMPLANTABLE DEVICE | Site: HIP | Status: FUNCTIONAL

## 2019-02-12 RX ORDER — ONDANSETRON 2 MG/ML
4 INJECTION INTRAMUSCULAR; INTRAVENOUS EVERY 30 MIN PRN
Status: DISCONTINUED | OUTPATIENT
Start: 2019-02-12 | End: 2019-02-12 | Stop reason: HOSPADM

## 2019-02-12 RX ORDER — MUPIROCIN 20 MG/G
1 OINTMENT TOPICAL 2 TIMES DAILY
Status: DISCONTINUED | OUTPATIENT
Start: 2019-02-12 | End: 2019-02-15 | Stop reason: HOSPADM

## 2019-02-12 RX ORDER — LIDOCAINE HYDROCHLORIDE ANHYDROUS AND EPINEPHRINE 10; 10 MG/ML; UG/ML
30 INJECTION, SOLUTION INFILTRATION ONCE
Status: DISCONTINUED | OUTPATIENT
Start: 2019-02-12 | End: 2019-02-12 | Stop reason: HOSPADM

## 2019-02-12 RX ORDER — SODIUM CHLORIDE, SODIUM LACTATE, POTASSIUM CHLORIDE, CALCIUM CHLORIDE 600; 310; 30; 20 MG/100ML; MG/100ML; MG/100ML; MG/100ML
INJECTION, SOLUTION INTRAVENOUS CONTINUOUS
Status: DISCONTINUED | OUTPATIENT
Start: 2019-02-12 | End: 2019-02-12 | Stop reason: HOSPADM

## 2019-02-12 RX ORDER — NALOXONE HYDROCHLORIDE 0.4 MG/ML
.1-.4 INJECTION, SOLUTION INTRAMUSCULAR; INTRAVENOUS; SUBCUTANEOUS
Status: DISCONTINUED | OUTPATIENT
Start: 2019-02-12 | End: 2019-02-12 | Stop reason: HOSPADM

## 2019-02-12 RX ORDER — FENTANYL CITRATE 50 UG/ML
INJECTION, SOLUTION INTRAMUSCULAR; INTRAVENOUS PRN
Status: DISCONTINUED | OUTPATIENT
Start: 2019-02-12 | End: 2019-02-12

## 2019-02-12 RX ORDER — MEPERIDINE HYDROCHLORIDE 25 MG/ML
12.5 INJECTION INTRAMUSCULAR; INTRAVENOUS; SUBCUTANEOUS
Status: DISCONTINUED | OUTPATIENT
Start: 2019-02-12 | End: 2019-02-12 | Stop reason: HOSPADM

## 2019-02-12 RX ORDER — CEFAZOLIN SODIUM 1 G/3ML
1 INJECTION, POWDER, FOR SOLUTION INTRAMUSCULAR; INTRAVENOUS SEE ADMIN INSTRUCTIONS
Status: DISCONTINUED | OUTPATIENT
Start: 2019-02-12 | End: 2019-02-12 | Stop reason: HOSPADM

## 2019-02-12 RX ORDER — ACETAMINOPHEN 325 MG/1
650 TABLET ORAL EVERY 4 HOURS PRN
Status: DISCONTINUED | OUTPATIENT
Start: 2019-02-15 | End: 2019-02-15 | Stop reason: HOSPADM

## 2019-02-12 RX ORDER — CELECOXIB 200 MG/1
400 CAPSULE ORAL ONCE
Status: COMPLETED | OUTPATIENT
Start: 2019-02-12 | End: 2019-02-12

## 2019-02-12 RX ORDER — ONDANSETRON 4 MG/1
4 TABLET, ORALLY DISINTEGRATING ORAL EVERY 30 MIN PRN
Status: DISCONTINUED | OUTPATIENT
Start: 2019-02-12 | End: 2019-02-12 | Stop reason: HOSPADM

## 2019-02-12 RX ORDER — ONDANSETRON 2 MG/ML
4 INJECTION INTRAMUSCULAR; INTRAVENOUS EVERY 6 HOURS PRN
Status: DISCONTINUED | OUTPATIENT
Start: 2019-02-12 | End: 2019-02-15 | Stop reason: HOSPADM

## 2019-02-12 RX ORDER — GLYCOPYRROLATE 0.2 MG/ML
INJECTION, SOLUTION INTRAMUSCULAR; INTRAVENOUS PRN
Status: DISCONTINUED | OUTPATIENT
Start: 2019-02-12 | End: 2019-02-12

## 2019-02-12 RX ORDER — LIDOCAINE 40 MG/G
CREAM TOPICAL
Status: DISCONTINUED | OUTPATIENT
Start: 2019-02-12 | End: 2019-02-12 | Stop reason: HOSPADM

## 2019-02-12 RX ORDER — CITALOPRAM HYDROBROMIDE 20 MG/1
20 TABLET ORAL DAILY
Status: DISCONTINUED | OUTPATIENT
Start: 2019-02-13 | End: 2019-02-15 | Stop reason: HOSPADM

## 2019-02-12 RX ORDER — HYDRALAZINE HYDROCHLORIDE 20 MG/ML
2.5-5 INJECTION INTRAMUSCULAR; INTRAVENOUS EVERY 10 MIN PRN
Status: DISCONTINUED | OUTPATIENT
Start: 2019-02-12 | End: 2019-02-12 | Stop reason: HOSPADM

## 2019-02-12 RX ORDER — METOPROLOL TARTRATE 1 MG/ML
1-2 INJECTION, SOLUTION INTRAVENOUS EVERY 5 MIN PRN
Status: DISCONTINUED | OUTPATIENT
Start: 2019-02-12 | End: 2019-02-12 | Stop reason: HOSPADM

## 2019-02-12 RX ORDER — ACETAMINOPHEN 325 MG/1
975 TABLET ORAL EVERY 8 HOURS
Status: DISCONTINUED | OUTPATIENT
Start: 2019-02-12 | End: 2019-02-15 | Stop reason: HOSPADM

## 2019-02-12 RX ORDER — ALBUTEROL SULFATE 0.83 MG/ML
2.5 SOLUTION RESPIRATORY (INHALATION) EVERY 4 HOURS PRN
Status: DISCONTINUED | OUTPATIENT
Start: 2019-02-12 | End: 2019-02-12 | Stop reason: HOSPADM

## 2019-02-12 RX ORDER — FENTANYL CITRATE 50 UG/ML
25-50 INJECTION, SOLUTION INTRAMUSCULAR; INTRAVENOUS EVERY 5 MIN PRN
Status: DISCONTINUED | OUTPATIENT
Start: 2019-02-12 | End: 2019-02-12 | Stop reason: HOSPADM

## 2019-02-12 RX ORDER — TEMAZEPAM 7.5 MG/1
7.5 CAPSULE ORAL
Status: DISCONTINUED | OUTPATIENT
Start: 2019-02-13 | End: 2019-02-15 | Stop reason: HOSPADM

## 2019-02-12 RX ORDER — LIDOCAINE 40 MG/G
CREAM TOPICAL
Status: DISCONTINUED | OUTPATIENT
Start: 2019-02-12 | End: 2019-02-14

## 2019-02-12 RX ORDER — CEFAZOLIN SODIUM 2 G/100ML
2 INJECTION, SOLUTION INTRAVENOUS EVERY 8 HOURS
Status: COMPLETED | OUTPATIENT
Start: 2019-02-13 | End: 2019-02-13

## 2019-02-12 RX ORDER — HYDROMORPHONE HYDROCHLORIDE 1 MG/ML
.3-.5 INJECTION, SOLUTION INTRAMUSCULAR; INTRAVENOUS; SUBCUTANEOUS
Status: DISCONTINUED | OUTPATIENT
Start: 2019-02-12 | End: 2019-02-15 | Stop reason: HOSPADM

## 2019-02-12 RX ORDER — DEXAMETHASONE SODIUM PHOSPHATE 4 MG/ML
INJECTION, SOLUTION INTRA-ARTICULAR; INTRALESIONAL; INTRAMUSCULAR; INTRAVENOUS; SOFT TISSUE PRN
Status: DISCONTINUED | OUTPATIENT
Start: 2019-02-12 | End: 2019-02-12

## 2019-02-12 RX ORDER — LIDOCAINE HYDROCHLORIDE 10 MG/ML
INJECTION, SOLUTION INFILTRATION; PERINEURAL PRN
Status: DISCONTINUED | OUTPATIENT
Start: 2019-02-12 | End: 2019-02-12

## 2019-02-12 RX ORDER — ONDANSETRON 4 MG/1
4 TABLET, ORALLY DISINTEGRATING ORAL EVERY 6 HOURS PRN
Status: DISCONTINUED | OUTPATIENT
Start: 2019-02-12 | End: 2019-02-15 | Stop reason: HOSPADM

## 2019-02-12 RX ORDER — DEXTROSE MONOHYDRATE, SODIUM CHLORIDE, AND POTASSIUM CHLORIDE 50; 1.49; 4.5 G/1000ML; G/1000ML; G/1000ML
INJECTION, SOLUTION INTRAVENOUS CONTINUOUS
Status: DISCONTINUED | OUTPATIENT
Start: 2019-02-12 | End: 2019-02-15 | Stop reason: HOSPADM

## 2019-02-12 RX ORDER — PROPOFOL 10 MG/ML
INJECTION, EMULSION INTRAVENOUS CONTINUOUS PRN
Status: DISCONTINUED | OUTPATIENT
Start: 2019-02-12 | End: 2019-02-12

## 2019-02-12 RX ORDER — AMOXICILLIN 250 MG
1 CAPSULE ORAL 2 TIMES DAILY
Status: DISCONTINUED | OUTPATIENT
Start: 2019-02-12 | End: 2019-02-15 | Stop reason: HOSPADM

## 2019-02-12 RX ORDER — GABAPENTIN 100 MG/1
100 CAPSULE ORAL
Status: COMPLETED | OUTPATIENT
Start: 2019-02-12 | End: 2019-02-12

## 2019-02-12 RX ORDER — NEOSTIGMINE METHYLSULFATE 1 MG/ML
VIAL (ML) INJECTION PRN
Status: DISCONTINUED | OUTPATIENT
Start: 2019-02-12 | End: 2019-02-12

## 2019-02-12 RX ORDER — OXYCODONE HYDROCHLORIDE 5 MG/1
5 TABLET ORAL EVERY 4 HOURS PRN
Status: DISCONTINUED | OUTPATIENT
Start: 2019-02-12 | End: 2019-02-12

## 2019-02-12 RX ORDER — AMOXICILLIN 250 MG
2 CAPSULE ORAL 2 TIMES DAILY
Status: DISCONTINUED | OUTPATIENT
Start: 2019-02-12 | End: 2019-02-15 | Stop reason: HOSPADM

## 2019-02-12 RX ORDER — ONDANSETRON 2 MG/ML
INJECTION INTRAMUSCULAR; INTRAVENOUS PRN
Status: DISCONTINUED | OUTPATIENT
Start: 2019-02-12 | End: 2019-02-12

## 2019-02-12 RX ORDER — NALOXONE HYDROCHLORIDE 0.4 MG/ML
.1-.4 INJECTION, SOLUTION INTRAMUSCULAR; INTRAVENOUS; SUBCUTANEOUS
Status: DISCONTINUED | OUTPATIENT
Start: 2019-02-12 | End: 2019-02-15 | Stop reason: HOSPADM

## 2019-02-12 RX ORDER — OXYCODONE HYDROCHLORIDE 5 MG/1
5-10 TABLET ORAL EVERY 4 HOURS PRN
Status: DISCONTINUED | OUTPATIENT
Start: 2019-02-12 | End: 2019-02-15 | Stop reason: HOSPADM

## 2019-02-12 RX ORDER — GABAPENTIN 100 MG/1
100 CAPSULE ORAL 3 TIMES DAILY
Status: COMPLETED | OUTPATIENT
Start: 2019-02-12 | End: 2019-02-15

## 2019-02-12 RX ORDER — EPHEDRINE SULFATE 50 MG/ML
INJECTION, SOLUTION INTRAMUSCULAR; INTRAVENOUS; SUBCUTANEOUS PRN
Status: DISCONTINUED | OUTPATIENT
Start: 2019-02-12 | End: 2019-02-12

## 2019-02-12 RX ORDER — HYDROXYZINE HYDROCHLORIDE 10 MG/1
10 TABLET, FILM COATED ORAL EVERY 6 HOURS PRN
Status: DISCONTINUED | OUTPATIENT
Start: 2019-02-12 | End: 2019-02-15 | Stop reason: HOSPADM

## 2019-02-12 RX ORDER — CEFAZOLIN SODIUM IN 0.9 % NACL 3 G/100 ML
3 INTRAVENOUS SOLUTION, PIGGYBACK (ML) INTRAVENOUS
Status: COMPLETED | OUTPATIENT
Start: 2019-02-12 | End: 2019-02-12

## 2019-02-12 RX ORDER — GABAPENTIN 300 MG/1
300 CAPSULE ORAL 2 TIMES DAILY
Status: DISCONTINUED | OUTPATIENT
Start: 2019-02-12 | End: 2019-02-12

## 2019-02-12 RX ORDER — PROCHLORPERAZINE MALEATE 5 MG
5 TABLET ORAL EVERY 6 HOURS PRN
Status: DISCONTINUED | OUTPATIENT
Start: 2019-02-12 | End: 2019-02-15 | Stop reason: HOSPADM

## 2019-02-12 RX ORDER — PROPOFOL 10 MG/ML
INJECTION, EMULSION INTRAVENOUS PRN
Status: DISCONTINUED | OUTPATIENT
Start: 2019-02-12 | End: 2019-02-12

## 2019-02-12 RX ORDER — METHOCARBAMOL 500 MG/1
500 TABLET, FILM COATED ORAL 4 TIMES DAILY PRN
Status: DISCONTINUED | OUTPATIENT
Start: 2019-02-12 | End: 2019-02-15 | Stop reason: HOSPADM

## 2019-02-12 RX ORDER — FENTANYL CITRATE 50 UG/ML
25-50 INJECTION, SOLUTION INTRAMUSCULAR; INTRAVENOUS
Status: DISCONTINUED | OUTPATIENT
Start: 2019-02-12 | End: 2019-02-12 | Stop reason: HOSPADM

## 2019-02-12 RX ADMIN — ROCURONIUM BROMIDE 3 MG: 10 INJECTION INTRAVENOUS at 16:24

## 2019-02-12 RX ADMIN — PHENYLEPHRINE HYDROCHLORIDE 100 MCG: 10 INJECTION, SOLUTION INTRAMUSCULAR; INTRAVENOUS; SUBCUTANEOUS at 18:06

## 2019-02-12 RX ADMIN — PHENYLEPHRINE HYDROCHLORIDE 100 MCG: 10 INJECTION, SOLUTION INTRAMUSCULAR; INTRAVENOUS; SUBCUTANEOUS at 18:26

## 2019-02-12 RX ADMIN — PHENYLEPHRINE HYDROCHLORIDE 100 MCG: 10 INJECTION, SOLUTION INTRAMUSCULAR; INTRAVENOUS; SUBCUTANEOUS at 17:54

## 2019-02-12 RX ADMIN — DEXAMETHASONE SODIUM PHOSPHATE 4 MG: 4 INJECTION, SOLUTION INTRA-ARTICULAR; INTRALESIONAL; INTRAMUSCULAR; INTRAVENOUS; SOFT TISSUE at 16:24

## 2019-02-12 RX ADMIN — SODIUM CHLORIDE, POTASSIUM CHLORIDE, SODIUM LACTATE AND CALCIUM CHLORIDE: 600; 310; 30; 20 INJECTION, SOLUTION INTRAVENOUS at 17:21

## 2019-02-12 RX ADMIN — GLYCOPYRROLATE 0.4 MG: 0.2 INJECTION, SOLUTION INTRAMUSCULAR; INTRAVENOUS at 19:13

## 2019-02-12 RX ADMIN — ROCURONIUM BROMIDE 10 MG: 10 INJECTION INTRAVENOUS at 17:19

## 2019-02-12 RX ADMIN — Medication 0.3 MG: at 20:54

## 2019-02-12 RX ADMIN — SODIUM CHLORIDE, POTASSIUM CHLORIDE, SODIUM LACTATE AND CALCIUM CHLORIDE: 600; 310; 30; 20 INJECTION, SOLUTION INTRAVENOUS at 16:15

## 2019-02-12 RX ADMIN — POTASSIUM CHLORIDE, DEXTROSE MONOHYDRATE AND SODIUM CHLORIDE: 150; 5; 450 INJECTION, SOLUTION INTRAVENOUS at 22:26

## 2019-02-12 RX ADMIN — SENNOSIDES AND DOCUSATE SODIUM 2 TABLET: 8.6; 5 TABLET ORAL at 22:16

## 2019-02-12 RX ADMIN — PHENYLEPHRINE HYDROCHLORIDE 150 MCG: 10 INJECTION, SOLUTION INTRAMUSCULAR; INTRAVENOUS; SUBCUTANEOUS at 17:37

## 2019-02-12 RX ADMIN — PHENYLEPHRINE HYDROCHLORIDE 100 MCG: 10 INJECTION, SOLUTION INTRAMUSCULAR; INTRAVENOUS; SUBCUTANEOUS at 18:41

## 2019-02-12 RX ADMIN — Medication 100 MG: at 16:24

## 2019-02-12 RX ADMIN — Medication 3 MG: at 19:13

## 2019-02-12 RX ADMIN — ROCURONIUM BROMIDE 10 MG: 10 INJECTION INTRAVENOUS at 17:09

## 2019-02-12 RX ADMIN — PROPOFOL 50 MCG/KG/MIN: 10 INJECTION, EMULSION INTRAVENOUS at 16:52

## 2019-02-12 RX ADMIN — FENTANYL CITRATE 50 MCG: 50 INJECTION, SOLUTION INTRAMUSCULAR; INTRAVENOUS at 16:53

## 2019-02-12 RX ADMIN — ACETAMINOPHEN 975 MG: 325 TABLET, FILM COATED ORAL at 22:16

## 2019-02-12 RX ADMIN — HYDROMORPHONE HYDROCHLORIDE 0.5 MG: 1 INJECTION, SOLUTION INTRAMUSCULAR; INTRAVENOUS; SUBCUTANEOUS at 19:33

## 2019-02-12 RX ADMIN — PHENYLEPHRINE HYDROCHLORIDE 100 MCG: 10 INJECTION, SOLUTION INTRAMUSCULAR; INTRAVENOUS; SUBCUTANEOUS at 18:35

## 2019-02-12 RX ADMIN — PHENYLEPHRINE HYDROCHLORIDE 100 MCG: 10 INJECTION, SOLUTION INTRAMUSCULAR; INTRAVENOUS; SUBCUTANEOUS at 18:29

## 2019-02-12 RX ADMIN — Medication 0.5 MG: at 21:05

## 2019-02-12 RX ADMIN — PHENYLEPHRINE HYDROCHLORIDE 100 MCG: 10 INJECTION, SOLUTION INTRAMUSCULAR; INTRAVENOUS; SUBCUTANEOUS at 17:47

## 2019-02-12 RX ADMIN — ONDANSETRON 4 MG: 2 INJECTION INTRAMUSCULAR; INTRAVENOUS at 17:08

## 2019-02-12 RX ADMIN — GABAPENTIN 100 MG: 100 CAPSULE ORAL at 22:16

## 2019-02-12 RX ADMIN — PROPOFOL 50 MG: 10 INJECTION, EMULSION INTRAVENOUS at 16:52

## 2019-02-12 RX ADMIN — Medication 1 G: at 18:52

## 2019-02-12 RX ADMIN — SODIUM CHLORIDE, POTASSIUM CHLORIDE, SODIUM LACTATE AND CALCIUM CHLORIDE: 600; 310; 30; 20 INJECTION, SOLUTION INTRAVENOUS at 18:18

## 2019-02-12 RX ADMIN — PHENYLEPHRINE HYDROCHLORIDE 100 MCG: 10 INJECTION, SOLUTION INTRAMUSCULAR; INTRAVENOUS; SUBCUTANEOUS at 16:37

## 2019-02-12 RX ADMIN — HYDROMORPHONE HYDROCHLORIDE 0.5 MG: 1 INJECTION, SOLUTION INTRAMUSCULAR; INTRAVENOUS; SUBCUTANEOUS at 17:09

## 2019-02-12 RX ADMIN — PHENYLEPHRINE HYDROCHLORIDE 100 MCG: 10 INJECTION, SOLUTION INTRAMUSCULAR; INTRAVENOUS; SUBCUTANEOUS at 18:14

## 2019-02-12 RX ADMIN — ROCURONIUM BROMIDE 20 MG: 10 INJECTION INTRAVENOUS at 17:15

## 2019-02-12 RX ADMIN — PHENYLEPHRINE HYDROCHLORIDE 100 MCG: 10 INJECTION, SOLUTION INTRAMUSCULAR; INTRAVENOUS; SUBCUTANEOUS at 17:27

## 2019-02-12 RX ADMIN — FENTANYL CITRATE 250 MCG: 50 INJECTION, SOLUTION INTRAMUSCULAR; INTRAVENOUS at 16:24

## 2019-02-12 RX ADMIN — SODIUM CHLORIDE, POTASSIUM CHLORIDE, SODIUM LACTATE AND CALCIUM CHLORIDE: 600; 310; 30; 20 INJECTION, SOLUTION INTRAVENOUS at 19:38

## 2019-02-12 RX ADMIN — PHENYLEPHRINE HYDROCHLORIDE 100 MCG: 10 INJECTION, SOLUTION INTRAMUSCULAR; INTRAVENOUS; SUBCUTANEOUS at 16:47

## 2019-02-12 RX ADMIN — PHENYLEPHRINE HYDROCHLORIDE 100 MCG: 10 INJECTION, SOLUTION INTRAMUSCULAR; INTRAVENOUS; SUBCUTANEOUS at 17:11

## 2019-02-12 RX ADMIN — FENTANYL CITRATE 50 MCG: 50 INJECTION, SOLUTION INTRAMUSCULAR; INTRAVENOUS at 19:46

## 2019-02-12 RX ADMIN — MIDAZOLAM 2 MG: 1 INJECTION INTRAMUSCULAR; INTRAVENOUS at 16:17

## 2019-02-12 RX ADMIN — PHENYLEPHRINE HYDROCHLORIDE 100 MCG: 10 INJECTION, SOLUTION INTRAMUSCULAR; INTRAVENOUS; SUBCUTANEOUS at 19:32

## 2019-02-12 RX ADMIN — FENTANYL CITRATE 50 MCG: 50 INJECTION, SOLUTION INTRAMUSCULAR; INTRAVENOUS at 19:37

## 2019-02-12 RX ADMIN — ROCURONIUM BROMIDE 25 MG: 10 INJECTION INTRAVENOUS at 16:35

## 2019-02-12 RX ADMIN — Medication 1 G: at 16:42

## 2019-02-12 RX ADMIN — CELECOXIB 400 MG: 200 CAPSULE ORAL at 12:56

## 2019-02-12 RX ADMIN — Medication 3 G: at 16:29

## 2019-02-12 RX ADMIN — PROPOFOL 150 MG: 10 INJECTION, EMULSION INTRAVENOUS at 16:24

## 2019-02-12 RX ADMIN — FENTANYL CITRATE 50 MCG: 50 INJECTION, SOLUTION INTRAMUSCULAR; INTRAVENOUS at 20:38

## 2019-02-12 RX ADMIN — CEFAZOLIN 1 G: 1 INJECTION, POWDER, FOR SOLUTION INTRAMUSCULAR; INTRAVENOUS at 18:30

## 2019-02-12 RX ADMIN — GABAPENTIN 100 MG: 100 CAPSULE ORAL at 12:56

## 2019-02-12 RX ADMIN — SODIUM CHLORIDE, POTASSIUM CHLORIDE, SODIUM LACTATE AND CALCIUM CHLORIDE: 600; 310; 30; 20 INJECTION, SOLUTION INTRAVENOUS at 19:50

## 2019-02-12 RX ADMIN — Medication 5 MG: at 17:36

## 2019-02-12 RX ADMIN — LIDOCAINE HYDROCHLORIDE 30 MG: 10 INJECTION, SOLUTION INFILTRATION; PERINEURAL at 16:24

## 2019-02-12 ASSESSMENT — LIFESTYLE VARIABLES: TOBACCO_USE: 1

## 2019-02-12 NOTE — ANESTHESIA PREPROCEDURE EVALUATION
Anesthesia Pre-Procedure Evaluation    Patient: Cristy Bailey   MRN: [id number] : 1945          Preoperative Diagnosis: PRIMARY OSTEOARTHRITIS OF LEFT HIP    Procedure(s):  Left total hip arthroplasty (Choice anesthesia)    Past Medical History:   Diagnosis Date     Arthritis     hip     HTN (hypertension) 2010      Leg weakness 3/24/2015     Lyme disease  AND     lYME DZ LIKE SXS RESPONDED TO ABX     Past Surgical History:   Procedure Laterality Date     ARTHROPLASTY HIP Right 2018    Procedure: ARTHROPLASTY HIP;  Right total hip arthroplasty;  Surgeon: Bry Garcia MD;  Location: [facility] OR     BIOPSY OF UTERUS LINING  3/2010    negative.      C C-SEC ONLY,PREV C-SEC      c-sec x 3      COLONOSCOPY  2/21/10    benign findings. advised 10 yr f/u.      INCISION AND DRAINAGE HIP, COMBINED Right 2018    Procedure: COMBINED INCISION AND DRAINAGE HIP;  Incision and debridement, right hip ;  Surgeon: Bry Garcia MD;  Location: [facility] OR     ORTHOPEDIC SURGERY Right 2018    Right hip I & D 18, Right JENAE, DOS 2018, Dr. Garcia. [facility]     Anesthesia Evaluation     . Pt has had prior anesthetic.            ROS/MED HX    ENT/Pulmonary:  - neg pulmonary ROS   (+)tobacco use, Past use , . .   (-) sleep apnea   Neurologic:       Cardiovascular:     (+) hypertension----. : . . . :. .       METS/Exercise Tolerance:     Hematologic:         Musculoskeletal:   (+) arthritis, , , -       GI/Hepatic:        (-) GERD   Renal/Genitourinary:         Endo:     (+) Obesity (morbid), .      Psychiatric:         Infectious Disease:         Malignancy:         Other:                          Physical Exam      Airway   Mallampati: III  TM distance: >3 FB  Neck ROM: full    Dental     Cardiovascular   Rhythm and rate: regular and normal      Pulmonary    breath sounds clear to auscultation            Lab Results   Component Value Date    WBC 8.9 2018    HGB 14.2  "01/28/2019    HCT 32.2 (L) 08/30/2018     08/30/2018    CRP 20.6 (H) 08/31/2018    SED 38 (H) 08/31/2018     01/28/2019    POTASSIUM 4.2 01/28/2019    CHLORIDE 107 01/28/2019    CO2 29 01/28/2019    BUN 20 01/28/2019    CR 0.67 01/28/2019    GLC 99 01/28/2019    ASHIA 9.1 01/28/2019    ALBUMIN 3.8 04/30/2018    PROTTOTAL 7.6 04/30/2018    ALT 30 04/30/2018    AST 24 04/30/2018    ALKPHOS 100 04/30/2018    BILITOTAL 0.6 04/30/2018    TSH 2.00 08/25/2017       Preop Vitals  BP Readings from Last 3 Encounters:   02/12/19 144/73   01/28/19 121/83   01/03/19 123/80    Pulse Readings from Last 3 Encounters:   01/28/19 75   09/21/18 63   09/02/18 76      Resp Readings from Last 3 Encounters:   02/12/19 16   01/28/19 12   09/21/18 14    SpO2 Readings from Last 3 Encounters:   02/12/19 97%   01/28/19 98%   09/21/18 96%      Temp Readings from Last 1 Encounters:   02/12/19 99.5  F (37.5  C) (Temporal)    Ht Readings from Last 1 Encounters:   02/12/19 1.676 m (5' 5.98\")      Wt Readings from Last 1 Encounters:   01/03/19 130.2 kg (287 lb)    Estimated body mass index is 46.35 kg/m  as calculated from the following:    Height as of this encounter: 1.676 m (5' 5.98\").    Weight as of 1/3/19: 130.2 kg (287 lb).       Anesthesia Plan      History & Physical Review  History and physical reviewed and following examination; no interval change.    ASA Status:  3 .    NPO Status:  > 8 hours    Plan for General and ETT with Intravenous and Propofol induction. Maintenance will be Balanced.    PONV prophylaxis:  Ondansetron (or other 5HT-3) and Dexamethasone or Solumedrol       Postoperative Care  Postoperative pain management:  IV analgesics, Oral pain medications and Multi-modal analgesia.      Consents  Anesthetic plan, risks, benefits and alternatives discussed with:  Patient..                 Haile Elias MD                    .  "

## 2019-02-13 ENCOUNTER — APPOINTMENT (OUTPATIENT)
Dept: PHYSICAL THERAPY | Facility: CLINIC | Age: 74
DRG: 470 | End: 2019-02-13
Attending: ORTHOPAEDIC SURGERY
Payer: COMMERCIAL

## 2019-02-13 LAB — HGB BLD-MCNC: 11.6 G/DL (ref 11.7–15.7)

## 2019-02-13 PROCEDURE — 25000128 H RX IP 250 OP 636: Performed by: ORTHOPAEDIC SURGERY

## 2019-02-13 PROCEDURE — 85018 HEMOGLOBIN: CPT | Performed by: ORTHOPAEDIC SURGERY

## 2019-02-13 PROCEDURE — 97162 PT EVAL MOD COMPLEX 30 MIN: CPT | Mod: GP | Performed by: PHYSICAL THERAPIST

## 2019-02-13 PROCEDURE — 12000000 ZZH R&B MED SURG/OB

## 2019-02-13 PROCEDURE — 25000125 ZZHC RX 250: Performed by: ORTHOPAEDIC SURGERY

## 2019-02-13 PROCEDURE — 97110 THERAPEUTIC EXERCISES: CPT | Mod: GP | Performed by: PHYSICAL THERAPIST

## 2019-02-13 PROCEDURE — 36415 COLL VENOUS BLD VENIPUNCTURE: CPT | Performed by: ORTHOPAEDIC SURGERY

## 2019-02-13 PROCEDURE — 40000193 ZZH STATISTIC PT WARD VISIT: Performed by: PHYSICAL THERAPIST

## 2019-02-13 PROCEDURE — 97530 THERAPEUTIC ACTIVITIES: CPT | Mod: GP | Performed by: PHYSICAL THERAPIST

## 2019-02-13 PROCEDURE — 25000132 ZZH RX MED GY IP 250 OP 250 PS 637: Performed by: ORTHOPAEDIC SURGERY

## 2019-02-13 RX ADMIN — CEFAZOLIN SODIUM 2 G: 2 INJECTION, SOLUTION INTRAVENOUS at 00:33

## 2019-02-13 RX ADMIN — OXYCODONE HYDROCHLORIDE 10 MG: 5 TABLET ORAL at 16:08

## 2019-02-13 RX ADMIN — GABAPENTIN 100 MG: 100 CAPSULE ORAL at 13:50

## 2019-02-13 RX ADMIN — SENNOSIDES AND DOCUSATE SODIUM 2 TABLET: 8.6; 5 TABLET ORAL at 08:26

## 2019-02-13 RX ADMIN — ACETAMINOPHEN 975 MG: 325 TABLET, FILM COATED ORAL at 13:50

## 2019-02-13 RX ADMIN — HYDROXYZINE HYDROCHLORIDE 10 MG: 10 TABLET ORAL at 13:10

## 2019-02-13 RX ADMIN — CITALOPRAM HYDROBROMIDE 20 MG: 20 TABLET ORAL at 08:24

## 2019-02-13 RX ADMIN — ACETAMINOPHEN 975 MG: 325 TABLET, FILM COATED ORAL at 06:46

## 2019-02-13 RX ADMIN — Medication 1 LOZENGE: at 06:50

## 2019-02-13 RX ADMIN — CEFAZOLIN SODIUM 2 G: 2 INJECTION, SOLUTION INTRAVENOUS at 08:26

## 2019-02-13 RX ADMIN — GABAPENTIN 100 MG: 100 CAPSULE ORAL at 08:25

## 2019-02-13 RX ADMIN — HYDROXYZINE HYDROCHLORIDE 10 MG: 10 TABLET ORAL at 19:06

## 2019-02-13 RX ADMIN — GABAPENTIN 100 MG: 100 CAPSULE ORAL at 19:06

## 2019-02-13 RX ADMIN — SENNOSIDES AND DOCUSATE SODIUM 2 TABLET: 8.6; 5 TABLET ORAL at 19:06

## 2019-02-13 RX ADMIN — OXYCODONE HYDROCHLORIDE 5 MG: 5 TABLET ORAL at 12:22

## 2019-02-13 RX ADMIN — ENOXAPARIN SODIUM 40 MG: 40 INJECTION SUBCUTANEOUS at 13:50

## 2019-02-13 ASSESSMENT — ACTIVITIES OF DAILY LIVING (ADL)
ADLS_ACUITY_SCORE: 22
ADLS_ACUITY_SCORE: 25
ADLS_ACUITY_SCORE: 25
ADLS_ACUITY_SCORE: 24
ADLS_ACUITY_SCORE: 22
ADLS_ACUITY_SCORE: 22

## 2019-02-13 NOTE — PLAN OF CARE
Pts cms intact.  Pts pain is controlled with po meds.  Pt pointed out a egg size lump on the left lower part of her neck. Pt states she noticed it at home one day .  I suggested she follow up with her primary .   Pt has not ambulated this shift. Pt states she was wheel chair bound  for months prior to surgery.pt was up in chair this afternoon with the lata steady . Pt is assist of 2. Pt states she is TCU vs home at discharge.

## 2019-02-13 NOTE — PLAN OF CARE
VS:stable, BP can run low  Orientation: A/O, forgetful  Tele: NA  Glucose checks: NA  Activity:not up yet  Diet: regular  GI: WDL  : low UOP, snyder in place  Respiratory:3LNC  IV: D5 1/2NS +K @100  Plan: TBD

## 2019-02-13 NOTE — PROGRESS NOTES
[facility]  Orthopedics Progress Note             Interval History:     73 year old female admitted postoperatively for elective Left JENAE,  with Dr. Chavo Rodriguez due to DJD unresponsive to non operative treatment.  There were no intraoperative complications.  Patient currently Post op day #1.    (note R JENAE 7/30/18)     Patient reports pain tolerable, eating, resting, no complaints.  Overall feels is doing much better than prev JENAE.    Notes also that she has home more accessible and good support from Leo, friend, who will be with her.    Pain: tolerable.   Nausea: none.   Light headedness : none  Chest pain: none  Shortness of breath: none              Assessment and Plan:   S/P L JENAE, day #1.  VSS, hemodynamically asymptomatic, pain management adequate.  [place].    PLAN:  DVT proph  PT/OT  Hip restrictions  [place] out 24 hours post op.  SW for placement vs home with home care services.    Discharge most likely Friday.                  Physical Exam:     Patient Vitals for the past 12 hrs:   BP Temp Temp src Heart Rate Resp SpO2   02/13/19 1020 102/55 -- -- 85 -- 93 %   02/13/19 0808 95/61 95.9  F (35.5  C) Oral 75 18 95 %   02/13/19 0406 127/64 96.3  F (35.7  C) Oral 87 19 94 %   02/13/19 0030 109/66 96.5  F (35.8  C) Oral 63 10 92 %     Hemoglobin   Date Value Ref Range Status   02/13/2019 11.6 (L) 11.7 - 15.7 g/dL Final   01/28/2019 14.2 11.7 - 15.7 g/dL Final       Patient is alert and oriented.  Neurovascular status: intact  Dressing: clean and dry  Calves: soft and non tender          Donal Grover PA-C  [facility] - Surgery    2/13/2019

## 2019-02-13 NOTE — OP NOTE
"February 12, 2019    Pre Operative Diagnosis: left hip DJD  Post Operative Diagnosis: left hip DJD    Title of the Operation: left Total hip arthroplasty ([employer] 60 mm acetabulum with 2 screws; #15 standard neck Synergy stem; +0 neck 36 mm head)  (extra difficult case because of her high BMI and deep subcutaneous fatty layer resulting in the operative time at least twice as long from typical operative time)    Anesthesia: general    Surgeon: Chavo Rodriguez MD    Assistant: MINH Mack    Assistance from the PA was necessary for this case due to the complexity of the procedure. PA helped with satisfactory exposure of the bones, protecting vital neurovascular and ligamentous structures. He also helped with maintaining the instruments for bone osteotomy and subsequent implantation of the components. He also performed wound closure and placement of postoperative dressing.    Drain: none    Indication: This patient is a 73 y.o. female who has had chronic hip pain due to arthritis. Pain is persisting despite all reasonable and appropriate treatments. The pre-operative x-rays confirmed the clinical diagnosis of arthritis. Because of progressively worsening pain that is affecting his day-to-day life significantly, the operation of total hip replacement is chosen. A thorough discussion regarding options, potential complications including but not exclusive of infection, leg length inequality, joesph-prosthetic fracture, deep vein thrombosis, anesthetic risks including death, etc was carried out.    Description of the Operation:  After satisfactory anesthesia was administered, the patient was then turned to the lateral decubitus position exposing the correct hip. A routine sterile prepping and draping was performed.  The standard \"time out\" was then carried out per protocol. At this point, administration preoperative antibiotic was confirmed.  A standard posterior approach to the hip was carried out. The incision was " made over the greater trochanter in a slightly curved fashion. Through a sharp skin dissection, the fascia over the gluteus onelia and IT band were identified.With placement of the east/west retractor, the short external rotator tendons were identified. The short external rotator tendons and the posterior capsule were then released from the proximal femur and the acetabulum respectively with care taken to protect the sciatic nerve. The hip joint is now exposed. The short external rotator tendons were tagged for future repair at the time of closure.  Subsequently, the femoral head was dislocated from the acetabulum. The findings at this point was felt to be consistent with advanced degenerative joint disease of the hip joint. Using the femoral neck cutting guide, the femoral neck osteotomy was then carried out according to the preoperative planning.  The acetabulum was then exposed with retraction of the femur anterior ly. With clear visualization, the acetabulum was then reamed in a sequential fashion until subchondral bleeding bone was visualized. The appropriate acetabular component was then impacted into an anatomic position which included 25-30 degrees of anteversion and 40-45 of abduction. The component was noted to be seated tightly to the point that no adjunctive screw fixation was felt to be necessary. At this point the acetabular liner was placed using the neutral liner.     At this point the attention was then paid to the femoral side. The intramedullary canal was identified by placing a canal finder. A sequential reaming of the intramedullary canal was then carried out until we felt that the reamer was tight within the canal. Next, a sequential broaching was carried out until the broach did not cause pistonNing or rotational movement. Next, the trial femoral neck and head components were placed on top of the stem. With the reduction of the hip joint,  leg length as well as range of motion were checked.  Once we found optimal leg length allowing full flexion beyond 90 degrees with the knee adducted to the other knee, no dislocation with 30 degrees of internal rotation and full flexion as well as 5 mm push/pull. Subsequently, we placed the actual femoral stem into the femoral canal.  The femoral head component was subsequently impacted onto the stem and the hip joint was once again reduced. With re-confirmation of satisfactory leg length and stability, the wound was irrigated copiously.  The  wound was closed in layers in a routine fashion followed by multiple layers of subcutaneous closure along with final skin closure done with staples. Soft dressing was applied.     The needle and sponge counts were  correct. And there were no identified intraoperative complications .The blood loss from this operation is as listed on the anesthesia record.    Chavo Rodriguez M.D.

## 2019-02-13 NOTE — ANESTHESIA POSTPROCEDURE EVALUATION
Patient: Cristy Bailey    Procedure(s):  Left total hip arthroplasty (Choice anesthesia)    Diagnosis:PRIMARY OSTEOARTHRITIS OF LEFT HIP  Diagnosis Additional Information: PRIMARY OSTEOARTHRITIS OF LEFT HIP    Anesthesia Type:  General, ETT    Note:  Anesthesia Post Evaluation    Patient location during evaluation: PACU  Patient participation: Able to fully participate in evaluation  Level of consciousness: awake and alert  Pain management: adequate  Airway patency: patent  Cardiovascular status: acceptable  Respiratory status: acceptable  Hydration status: acceptable  PONV: controlled     Anesthetic complications: None          Last vitals:  Vitals:    02/12/19 2054 02/12/19 2055 02/12/19 2100   BP:  100/47    Pulse:  62    Resp:  12    Temp:      SpO2: 98% 100% 99%         Electronically Signed By: Ryley Vicente MD  February 12, 2019  9:06 PM

## 2019-02-13 NOTE — BRIEF OP NOTE
[facility]    Brief Operative Note    Pre-operative diagnosis: PRIMARY OSTEOARTHRITIS OF LEFT HIP  Post-operative diagnosis severe left hip DJD.   Procedure: Procedure(s):  Left total hip arthroplasty (Choice anesthesia)  Surgeon: Surgeon(s) and Role:     * Chavo Rodriguez MD - Primary     * Donal Grover PA-C - Assisting  Anesthesia: General   Estimated blood loss: 800  Drains: None  Specimens: * No specimens in log *  Findings:   None.  Complications: None.  Implants: Per op note.

## 2019-02-13 NOTE — ANESTHESIA CARE TRANSFER NOTE
Patient: Cristy Bailey    Procedure(s):  Left total hip arthroplasty (Choice anesthesia)    Diagnosis: PRIMARY OSTEOARTHRITIS OF LEFT HIP  Diagnosis Additional Information: No value filed.    Anesthesia Type:   General, ETT     Note:  Airway :Face Mask  Patient transferred to:PACU  Comments: Report and signed off to RN in PACU.  Good Resps, skin pink, VSS, O2 via face tent. HOB up 30 degrees.      Vitals: (Last set prior to Anesthesia Care Transfer)    CRNA VITALS  2/12/2019 1857 - 2/12/2019 1939      2/12/2019             Pulse:  82    SpO2:  87 %  (Abnormal)     Resp Rate (observed):  15                Electronically Signed By: ISRAEL Dailey CRNA  February 12, 2019  7:39 PM

## 2019-02-13 NOTE — PROGRESS NOTES
02/13/19 0953   Quick Adds   Type of Visit Initial PT Evaluation   Living Environment   Lives With other (see comments)  (SO)   Living Arrangements mobile home   Home Accessibility wheelchair accessible   Living Environment Comment wc accessable   mobile home   Self-Care   Usual Activity Tolerance poor   Current Activity Tolerance poor   Equipment Currently Used at Home grab bar, toilet;grab bar, tub/shower;walker, rolling;wheelchair, manual;shower chair;raised toilet  (ramp)   Activity/Exercise/Self-Care Comment Pt has been wc bound past 8 months due to pain. Transfers with Max A x1 w/ dulce maria.    Functional Level Prior   Ambulation 4-->completely dependent  (past 8 months)   Transferring 3-->assistive equipment and person   Toileting 3-->assistive equipment and person   Bathing 3-->assistive equipment and person   Communication 0-->understands/communicates without difficulty   Swallowing 0-->swallows foods/liquids without difficulty   Cognition 0 - no cognition issues reported   Fall history within last six months no   Prior Functional Level Comment R JENAE arthroplasty last summer, went to TCU for 1-2 weeks.    General Information   Onset of Illness/Injury or Date of Surgery - Date 02/12/19   Referring Physician Jennifer   Patient/Family Goals Statement Home if able.   Integumentary/Edema   Integumentary/Edema Comments LLE edema   Range of Motion (ROM)   ROM Comment hip and knee to 45 degrees   Strength   Strength Comments L DF 2-/5, L EHL 1/5 ( new post op per pt)   Bed Mobility   Bed Mobility Comments HOB fully elevated, Mod A x2  (pt sleeps in a lift chair at baseline)   Transfer Skills   Transfer Comments NT this am   Gait   Gait Comments NT at eval  (pt has not ambulated for approx 7 months)   Balance   Balance Comments sitting balance WNL   General Therapy Interventions   Planned Therapy Interventions bed mobility training;strengthening;ROM;transfer training;gait training   Intervention Comments gait if able.  "transfers with sars stedy initially   Clinical Impression   Criteria for Skilled Therapeutic Intervention yes, treatment indicated   PT Diagnosis significant functional mobility loss past 7+ months   Influenced by the following impairments Painful L hip, anxiety, morbid obesity   Functional limitations due to impairments Pt has not ambulatd for 7+ months, sleeps in a lift chair, max A transfers with SO.   Clinical Presentation Evolving/Changing   Clinical Presentation Rationale per clinical observation, pt very anxious,, pain levels fluctuate, mild hypotension   Clinical Decision Making (Complexity) Moderate complexity   Therapy Frequency` 2 times/day   Predicted Duration of Therapy Intervention (days/wks) 3 days   Anticipated Discharge Disposition Transitional Care Facility;Home with Assist  (pending mobility/A level)   Risk & Benefits of therapy have been explained Yes   Patient, Family & other staff in agreement with plan of care Yes   [facility] TM \"6 Clicks\"   2016, Trustees of [facility], under license to [employer].  All rights reserved.   6 Clicks Short Forms Basic Mobility Inpatient Short Form   [facility]  \"6 Clicks\" V.2 Basic Mobility Inpatient Short Form   1. Turning from your back to your side while in a flat bed without using bedrails? 2 - A Lot   2. Moving from lying on your back to sitting on the side of a flat bed without using bedrails? 2 - A Lot   3. Moving to and from a bed to a chair (including a wheelchair)? 1 - Total   4. Standing up from a chair using your arms (e.g., wheelchair, or bedside chair)? 1 - Total   5. To walk in hospital room? 1 - Total   6. Climbing 3-5 steps with a railing? 1 - Total   Basic Mobility Raw Score (Score out of 24.Lower scores equate to lower levels of function) 8   Total Evaluation Time   Total Evaluation Time (Minutes) 25     "

## 2019-02-14 ENCOUNTER — APPOINTMENT (OUTPATIENT)
Dept: ULTRASOUND IMAGING | Facility: CLINIC | Age: 74
DRG: 470 | End: 2019-02-14
Attending: PHYSICIAN ASSISTANT
Payer: COMMERCIAL

## 2019-02-14 ENCOUNTER — APPOINTMENT (OUTPATIENT)
Dept: PHYSICAL THERAPY | Facility: CLINIC | Age: 74
DRG: 470 | End: 2019-02-14
Attending: ORTHOPAEDIC SURGERY
Payer: COMMERCIAL

## 2019-02-14 LAB
GLUCOSE SERPL-MCNC: 116 MG/DL (ref 70–99)
HGB BLD-MCNC: 10.2 G/DL (ref 11.7–15.7)

## 2019-02-14 PROCEDURE — 25000132 ZZH RX MED GY IP 250 OP 250 PS 637: Performed by: ORTHOPAEDIC SURGERY

## 2019-02-14 PROCEDURE — 76536 US EXAM OF HEAD AND NECK: CPT

## 2019-02-14 PROCEDURE — 36415 COLL VENOUS BLD VENIPUNCTURE: CPT | Performed by: ORTHOPAEDIC SURGERY

## 2019-02-14 PROCEDURE — 99221 1ST HOSP IP/OBS SF/LOW 40: CPT | Performed by: PHYSICIAN ASSISTANT

## 2019-02-14 PROCEDURE — 97110 THERAPEUTIC EXERCISES: CPT | Mod: GP | Performed by: PHYSICAL THERAPIST

## 2019-02-14 PROCEDURE — 97530 THERAPEUTIC ACTIVITIES: CPT | Mod: GP | Performed by: PHYSICAL THERAPIST

## 2019-02-14 PROCEDURE — 85018 HEMOGLOBIN: CPT | Performed by: ORTHOPAEDIC SURGERY

## 2019-02-14 PROCEDURE — 12000000 ZZH R&B MED SURG/OB

## 2019-02-14 PROCEDURE — 25000128 H RX IP 250 OP 636: Performed by: ORTHOPAEDIC SURGERY

## 2019-02-14 PROCEDURE — 99207 ZZC CONSULT E&M CHANGED TO INITIAL LEVEL: CPT | Performed by: PHYSICIAN ASSISTANT

## 2019-02-14 PROCEDURE — 82947 ASSAY GLUCOSE BLOOD QUANT: CPT | Performed by: ORTHOPAEDIC SURGERY

## 2019-02-14 RX ORDER — OXYCODONE HYDROCHLORIDE 5 MG/1
2.5-5 TABLET ORAL EVERY 4 HOURS PRN
Status: SHIPPED | DISCHARGE
Start: 2019-02-14 | End: 2019-02-20

## 2019-02-14 RX ORDER — ASPIRIN 325 MG
325 TABLET ORAL DAILY
Qty: 30 TABLET | Refills: 0 | DISCHARGE
Start: 2019-02-14 | End: 2019-02-25

## 2019-02-14 RX ORDER — METHOCARBAMOL 500 MG/1
500 TABLET, FILM COATED ORAL 4 TIMES DAILY PRN
DISCHARGE
Start: 2019-02-14 | End: 2019-02-25

## 2019-02-14 RX ORDER — AMOXICILLIN 250 MG
1 CAPSULE ORAL 2 TIMES DAILY PRN
Qty: 60 TABLET | Refills: 0 | DISCHARGE
Start: 2019-02-14 | End: 2019-05-17

## 2019-02-14 RX ADMIN — OXYCODONE HYDROCHLORIDE 10 MG: 5 TABLET ORAL at 16:23

## 2019-02-14 RX ADMIN — SENNOSIDES AND DOCUSATE SODIUM 2 TABLET: 8.6; 5 TABLET ORAL at 07:48

## 2019-02-14 RX ADMIN — ACETAMINOPHEN 975 MG: 325 TABLET, FILM COATED ORAL at 21:11

## 2019-02-14 RX ADMIN — ACETAMINOPHEN 975 MG: 325 TABLET, FILM COATED ORAL at 13:56

## 2019-02-14 RX ADMIN — ACETAMINOPHEN 975 MG: 325 TABLET, FILM COATED ORAL at 06:15

## 2019-02-14 RX ADMIN — ENOXAPARIN SODIUM 40 MG: 40 INJECTION SUBCUTANEOUS at 13:56

## 2019-02-14 RX ADMIN — OXYCODONE HYDROCHLORIDE 10 MG: 5 TABLET ORAL at 07:48

## 2019-02-14 RX ADMIN — CITALOPRAM HYDROBROMIDE 20 MG: 20 TABLET ORAL at 07:49

## 2019-02-14 RX ADMIN — SENNOSIDES AND DOCUSATE SODIUM 2 TABLET: 8.6; 5 TABLET ORAL at 21:11

## 2019-02-14 RX ADMIN — GABAPENTIN 100 MG: 100 CAPSULE ORAL at 21:11

## 2019-02-14 RX ADMIN — OXYCODONE HYDROCHLORIDE 10 MG: 5 TABLET ORAL at 11:36

## 2019-02-14 RX ADMIN — HYDROXYZINE HYDROCHLORIDE 10 MG: 10 TABLET ORAL at 21:10

## 2019-02-14 RX ADMIN — OXYCODONE HYDROCHLORIDE 10 MG: 5 TABLET ORAL at 21:10

## 2019-02-14 RX ADMIN — GABAPENTIN 100 MG: 100 CAPSULE ORAL at 13:56

## 2019-02-14 RX ADMIN — GABAPENTIN 100 MG: 100 CAPSULE ORAL at 07:49

## 2019-02-14 ASSESSMENT — ACTIVITIES OF DAILY LIVING (ADL)
ADLS_ACUITY_SCORE: 27
ADLS_ACUITY_SCORE: 25
ADLS_ACUITY_SCORE: 27

## 2019-02-14 NOTE — CONSULTS
.Care Transition Initial Assessment -   Reason For Consult: discharge planning. Chart reviewed noting per ortho PA the anticipation of pt's transfer to rehab facility, noted PT progress with recommendation of TCU on discharge.   Met with: Patient    Active Problems:    Status post THR (total hip replacement)       DATA  Lives With:  friend  Living Arrangements: mobile home  Quality of Family Relationships: involved  Description of Support System: Involved, Supportive  Who is your support system?: Sibling(s), Friend , Leo  Support Assessment: Adequate family and caregiver support.   Resources List: Skilled Nursing Facility     Quality of Family Relationships: involved  Transportation Anticipated: (to be determined)    INTERVENTION    Met with pt who affirms planning for her tarsnefr to rehab facility, noting that her facility of preference would be [facility] due to previous stay there. SW has informed her that in contact with facility today, it  was determined that they have no openings now, and none anticipated until next week.      Pt was provided with SNF listing for review, based on review and further discussion she asked that referrals be made to [facility] and [facility], semi-private room requested. Referrals made, questions addressed. Pt is anticipate her friendLeo providing transportation for her.             ASSESSMENT  Cognitive Status:  alert and oriented    PLAN      Patient given options and choices for discharge: Yes  Patient/family is agreeable to the plan?  Yes:   Patient Goals and Preferences: independence with ambulation and ADLs  Patient anticipates discharging to:  rehab facility    Will await assessments, further MD determination of discharge date, continue planning accordingly.     Addendum:     D: [facility] and [facility] have assessed and would have bed available tomorrow if pt is determined medically stable for discharge. Met with pt and discussed, she has  asked that planning continue at this point for her transfer to [place], they are in process of pursuing insurance authorization.

## 2019-02-14 NOTE — PROGRESS NOTES
[facility]  Orthopedics Progress Note             Interval History:     73 year old female admitted postoperatively for elective Left JENAE  with Dr. Chavo leroy due to DJD unresponsive to non operative treatment.  There were no intraoperative complications.  Patient currently Post op day #2.    Patient reports pain tolerable.  Eating, transferred with assistance. Resting.  Noticing lack of foot dorsiflexion. After Discussion, pt amenable to TCU.     Nursing noted lump at left neck.    Pain: tolerable   Nausea: none.  Light headedness : none  Chest pain: none  Shortness of breath: none              Assessment and Plan:   S/P left JENAE day #2.  VSS, hemodynamically asymptomatic, pain management adequate.  [place].  Left neck mass.  Loss of dorsiflexion left great toe and ankle.  TCU placement needed.    PLAN:  Hospitalist for mass, rule out vasculature.  PT/OT  SW for TCU placement.  Hip restrictions.                  Physical Exam:     Patient Vitals for the past 12 hrs:   BP Temp Temp src Pulse Resp SpO2   02/14/19 0738 104/73 97.3  F (36.3  C) Oral 87 16 92 %   02/14/19 0055 105/59 96.6  F (35.9  C) Oral 82 18 98 %   02/13/19 2201 -- -- -- -- 18 --     Hemoglobin   Date Value Ref Range Status   02/14/2019 10.2 (L) 11.7 - 15.7 g/dL Final   02/13/2019 11.6 (L) 11.7 - 15.7 g/dL Final       Patient is alert and oriented.  Neurovascular status: Loss of left GR toe and ankle dorsiflexion, sensation intact. Otherwise LE grossly intact  Dressing: clean and dry  Calves: soft and non tender          Donal Grover PA-C  [facility] - Surgery    2/14/2019

## 2019-02-14 NOTE — PLAN OF CARE
Discharge Planner PT   Patient plan for discharge: Pt now agreeable to TCU  Current status: Still anxious, but improved from yesterday. LLE more painful per pt report. Pt still cannot DF L ankle, MMT grade 2-/5. Mod A bed mobility, Mod A x2 pivot transfer to , shuffles. Tolerated LE ex in chair.   Barriers to return to prior living situation: Pt is not ambulatory, currently A x 2 for tranfers  Recommendations for discharge: TCU  Rationale for recommendations: to advance tranfers and gait training as pt has not ambulated in over 7 months. Needs 2 assist.        Entered by: Sarah Bernal 02/14/2019 9:53 AM

## 2019-02-14 NOTE — PROGRESS NOTES
Foot drop not improve.  Possible Peroneal injury.  Switch to foot PCD and ordered AFO brace for ambulation.    Donal Grover PA-C  [facility] - Surgery

## 2019-02-14 NOTE — PROGRESS NOTES
Patient A&Ox4. VSS. Ambulates Max Ax2 pivot to chair/commode, w/ WW & GB. Patient has been having high levels of anxiety intermittently throughout the day, particularly during transfering, requires much reassurance and explanation. +BS/gas, tolerating diet. Patient reports numbness to LLE, poor dorsiflexion. Dressing CDI. PRN oxycodone for pain control. Plan of an ultrasound of left neck/shoulder later today to assess a mass. Plan is TCU on discharge. Will continue to monitor.

## 2019-02-14 NOTE — PLAN OF CARE
Low BPs 80-100s/40-50s, asymptomatic.  Pain managed with oral pain meds + cold.  LS clear bilaterally, RA, needs freq. encouragements hourly CDB/IS x10.  CMS + except L foot numbed & trace edema BLEs, baseline numbness R hand fr. carpal tunnel.  Drsg CDI.  Up with A2 + SS, sat up in chair.  [place]illo left in due to immobility, order DC tomorrow.  Pt hoping DC home, but possibly TCU.

## 2019-02-14 NOTE — CONSULTS
"Hospitalist Consultation      Cristy Bailey MRN# [id number]   YOB: 1945 Age: 73 year old   Date of Admission: 2/12/2019     Requesting Physician:  Jennifer  Reason for consult: Left neck mass           Assessment and Plan:   This patient is a 73 year old female without significant PMH who is POD 2 s/p Left JENAE for DJD. Nursing had noted a \"lump\" or fluid fill mass around the base of the left/neck shoulder so I was consulted to see this mass. Pt was very anxious and concerned that it could be malignancy. Apparently she thinks this mass has been there for about 1 month.     1. Left neck mass: Area is about 4x5 cm round and fluid filled cyst like lesion at the base of the left neck at the POSTERIOR triangle. It is non-tender and does not affect breathing or swallowing.   Will get neck US to assess but does not seem to be indicating any immediate intervention. Can likely get outpt work up pending the results of US.     2. LE paraesthesia: stable. Cont Gabapentin    3. Anxiety: pt has significant anxiety and is very worried that this might be cancer. Reassured pt.     4. S/p LTHA: doing well, cont PT/OT and pain control as per primary  5. DVT Prophylaxis: on Lovenox as per primary  6. D/C planning: To TCU as per primary    Hospitalist Addendum:  US shows:  IMPRESSION: In this area, there is a solid mass measuring 4 x 3.6 x  1.3 cm. The ultrasound appearance is nonspecific, but lipoma would be  in the differential diagnosis. MRI recommended.    Certainly she has no pain, no respiratory or airway compromise. This can be followed up as an outpt. Will inform pt of results tomorrow.              History of Present Illness:   This patient is a 73 year old female who is POD 2 s/p LTHA for progressive DJD.   Intra-op report reviewed and showed no intra-op complications.     Overall doing well from post op stand point but nurse noticed a round fluid filled mass at the base of the left neck. Pt was very anxious and " worried thinking that it could be malignant.   Upon further questioning she has had this lump for about a month. Does not interfere with swallowing or breathing. Not painful.   Overnight did pretty well, no complaints, VSS.   Currently, today esperanza diet, pain controlled, no CP, SOB, no n/v.   O/w other medical problems have been stable, with no recent c/o illness.               Past Medical History:     Past Medical History:   Diagnosis Date     Arthritis     hip     HTN (hypertension) 2010      Leg weakness 3/24/2015     Lyme disease  AND     lYME DZ LIKE SXS RESPONDED TO ABX             Past Surgical History:     Past Surgical History:   Procedure Laterality Date     ARTHROPLASTY HIP Right 2018    Procedure: ARTHROPLASTY HIP;  Right total hip arthroplasty;  Surgeon: Bry Garcia MD;  Location: [facility] OR     ARTHROPLASTY HIP Left 2019    Procedure: Left total hip arthroplasty ([employer] 60 mm acetabulum with 2 screws; #15 standard neck Synergy stem; +0 neck 36 mm head) (extra difficult case because of her high BMI and deep subcutaneous fatty layer resulting in the operative time at least twice as long from typical operative time);  Surgeon: Chavo Rodriguez MD;  Location:  OR     BIOPSY OF UTERUS LINING  3/2010    negative.      C C-SEC ONLY,PREV C-SEC      c-sec x 3      COLONOSCOPY  2/21/10    benign findings. advised 10 yr f/u.      INCISION AND DRAINAGE HIP, COMBINED Right 2018    Procedure: COMBINED INCISION AND DRAINAGE HIP;  Incision and debridement, right hip ;  Surgeon: Bry Gacria MD;  Location: [facility] OR     ORTHOPEDIC SURGERY Right 2018    Right hip I & D 18, Right JENAE, DOS 2018, Dr. Garcia. [facility]                 Social History:     Social History     Tobacco Use     Smoking status: Former Smoker     Last attempt to quit: 1984     Years since quittin.5     Smokeless tobacco: Never Used   Substance Use Topics      "Alcohol use: No     Drug use: No          Family History:     family history includes Alcohol/Drug in her brother; C.A.D. in her brother; Cerebrovascular Disease in her father; Diabetes in her brother; Family History Negative in her brother, brother, daughter, daughter, mother, sister, sister, and son; Heart Disease in her brother; Unknown/Adopted in her mother.  Family Hx fully reviewed and is non contributory to this admission.             Allergies:     Allergies   Allergen Reactions     No Known Drug Allergies              Medications:     Prior to Admission medications    Medication Sig Last Dose Taking? Auth Provider   acetaminophen (TYLENOL) 325 MG tablet Take 3 tablets (975 mg) by mouth every 8 hours 2/11/2019 at Unknown time Yes Manoj Daley PA-C   gabapentin (NEURONTIN) 300 MG capsule Take 1 tablet PO in the morning, 1 tablet PO midday, and 2 tablets PO at night. Past Week at Unknown time Yes Manoj Daley PA-C   mupirocin (BACTROBAN) 2 % nasal ointment Spray 1 g into both nostrils 2 times daily Apply small amount in each nostril twice daily for seven days prior to surgery. 2/11/2019 at Unknown time Yes Reported, Patient   citalopram (CELEXA) 20 MG tablet Take 20 mg by mouth daily More than a month at Unknown time  Reported, Patient               Review of Systems:   A comprehensive greater than 10 system review of systems was carried out.  Pertinent positives and negatives are noted above.  Otherwise negative for contributory info.            Physical Exam:   Vitals were reviewed  Blood pressure 104/73, pulse 87, temperature 97.3  F (36.3  C), temperature source Oral, resp. rate 16, height 1.676 m (5' 5.98\"), SpO2 92 %, not currently breastfeeding.  Exam:    GENERAL:  Comfortable.  PSYCH: pleasant, oriented, No acute distress.  HEENT:  PERRLA. Normal conjunctiva, normal hearing, nasal mucosa and Oropharynx are normal.  NECK:  Supple, no neck vein distention, adenopathy or bruits, normal " thyroid.  HEART:  Normal S1, S2 with no murmur, no pericardial rub, S3 or S4.  LUNGS:  Clear to auscultation, normal Respiratory effort.  ABDOMEN:  Soft, no hepatosplenomegaly, normal bowel sounds.  EXTREMITIES:  No pedal edema, +2 pulses bilateral and equal.  Left hip dressing c/d/i  SKIN:  Dry to touch, No rash, wound or ulcerations.  NEUROLOGIC:  Nonfocal with normal cranial nerve and motor power and sensation.            Data:   Past 24 hours labs, studies, and imaging were reviewed.  Recent Labs   Lab 02/14/19  0614 02/13/19  0636 02/12/19  2217   HGB 10.2* 11.6*  --    PLT  --   --  195     Recent Labs   Lab 02/14/19  0614 02/12/19  2217   *  --    CR  --  0.71   GFRESTIMATED  --  85   GFRESTBLACK  --  >90     Najma Olivares PA-C

## 2019-02-14 NOTE — PLAN OF CARE
VS:  low bp's  Orientation: A/O, forgetful, anxious  Tele: NA  Glucose checks: NA  Activity: x2 walker/gb  Diet: regular  GI: hypoactive bowel sounds  : snyder due to be removed and patient DTV  Respiratory: RA  IV: SL  Plan: home vs TCU on Friday

## 2019-02-14 NOTE — PLAN OF CARE
PT: PT attempted at scheduled time. Pt just back from a scan to assess her neck lump/mass. Eating lunch. Will continue to follow.

## 2019-02-14 NOTE — PROGRESS NOTES
Here to see patient for afo.  Patient said doesn't have footwear here and has nothing that fits at home because of edema.  Said has been in a chair for a year and has been fine w/o shoes.  I discussed with her that I cannot fit an orthosis w/o shoes.  She has tight gastrocs on left side that may impede fitting along with the edema.   I discussed with nursing to try and have the patient transfer as late as possible tomorrow because it may be difficult obtaining the proper items to get it to work for the patient.  I will follow in the AM.  Keven DOOLEY.

## 2019-02-14 NOTE — PLAN OF CARE
Order received, chart reviewed, at this time no OT evaluation/treatment completed    Discharge Planner OT   Patient plan for discharge: TCU  Current status: currently POD#2 L JENAE, Mod A for bed mobility, A of 2 for transfers  Barriers to return to prior living situation: refer to PT  Recommendations for discharge: refer to PT  Rationale for recommendations: as safe discharge planning in place and anticipated transfer to TCU tomorrow, will defer OT services to next level of care       Entered by: Elin Stratton 02/14/2019 4:40 PM

## 2019-02-14 NOTE — DISCHARGE INSTRUCTIONS
POST OP TOTAL HIP INSTRUCTIONS:    Incision care:  To close the incision, staples will be used in most cases. The staples will be removed at the office in 10-14 days from the surgery. If you are going to a TCU (nursing home) from the hospital, the staples can be removed at the nursing home in that time frame. For first 3 days, place a water proof cover over the dressing for showers. If the dressings get wet, change it with new gauze and paper tape. The incision can be wet after 4 days from the surgery.  You can take a shower but do not soak the incision. The incision should be covered with a light gauze that is held by 2 inch paper tape until follow up.     If you have an aquacell dressing, (flesh colored rubber like bandage), you may leave this dressing in place until follow up in the clinic, unless; the dressing becomes completely saturated with blood, is leaking, gets water inside as evident by moisture appearing on the inside of the dressing, or you have a skin reaction.  In this case you should remove and use dressings like what is mentioned above.    Soft tissue: It is not unusual to have swelling in the leg (thigh down to the foot) up to 2 months from the surgery. Frequent ankle pumping, elevation of the leg above the heart level and gentle compression support with ace wrap should help with swelling. Icing regularly, for 20 minutes every hour, will also help with swelling and pain.  Due to soft tissue swelling, increased amount of redness around the incision site is also commonly seen. Progressively worsening redness along with increasing pain or increasing drainage from the wound should be a reason to alert us immediately.  You may also experience bruising.  This may occur anywhere from your toes, through the leg and even onto your torso.  It may show up even 1 week after surgery.      Medication: You will be discharged from the hospital with pain medication(s). In most cases, consistent use the pain pills  can be limited to a few days. After this period, the medication should be weaned off as soon as possible to avoid potential complications of constipation, nausea, or rash, etc. Until you can be completely off the pain pills, using them only at nighttime along with controlling the pain with over-the-counter medication(s) such as Ibuprofen/Naproxen and/or Tylenol during the day would be a good way of managing the pain.       In order to minimize the potential problem of blood clot, you'll be asked to take aspirin (low risk patients) or undergo a period of blood thinner injections. If you were on Coumadin before the surgery you will be going back to it after the surgery.    It is also recommend you take a stool softener while taking opioid prescription pain mediation to avoid constipation.   You may also need to add a laxative to the routine if you have not had a bowel movement for several days following surgery.  Also stay well hydrated in general during this period.    Activities:  One of the main problems related to the hip replacement is that there is 1-3% chance for dislocation. This means the ball comes out of the socket.  This is due to the fact that the size of the femoral head is going to be smaller than the native femoral head and also due to the fact that the hip capsule which supports the joint is disrupted and repaired to perform the surgery.  As a result, there is a lack of a protecting barrier until it is re-established with healing of the capsule, which takes a minimum of 4 months.  For this reason, you are asked to avoid bending your hip greater than 90 degrees of flexion for the first 4 months. This also means avoiding sitting on a very plush chair/couch and not crossing the legs.    As long as there is no fracture complication, you can put all the weight on the operated leg. Walker/crutches can be discontinued according to your own progress. The general guideline is to discontinue the walker/crutches  when you feel fairly comfortable and confident with your balance. You can return to walking, swimming, golfing, double tennis but jogging or running are not recommended.     You can lie on either side of your body as soon as you feel comfortable putting pressure on the incision. We recommend putting one or 2 pillows in between the legs when you lie on that side.     Please do not undergo procedures such as dental cleaning, oral surgery, colonoscopy as well as any type of surgery that involves a significant incision for 4 months after surgery.  In case of an emergency, you will need an antibiotic, usually amoxicillin or clindamycin is used for this purpose.  You'll be taking the medication one hour before the procedure.  Please let the provider know that you have artificial implants in your body    Also, for the rest of your life, some providers recommend you take an antibiotic for procedures such as dental cleaning, oral surgery, colonoscopy as well as any type of surgery that involves a significant incision.  Please check with the provider performing the procedure and notify them of your implant.    Follow up in clinic within 14 days after surgery.  May call [phone] to schedule.    Donal Grover PA-C  [facility] - Surgery  [facility]  [address] [place]  [address]  [phone]

## 2019-02-15 ENCOUNTER — APPOINTMENT (OUTPATIENT)
Dept: PHYSICAL THERAPY | Facility: CLINIC | Age: 74
DRG: 470 | End: 2019-02-15
Attending: ORTHOPAEDIC SURGERY
Payer: COMMERCIAL

## 2019-02-15 VITALS
HEIGHT: 66 IN | TEMPERATURE: 97.6 F | OXYGEN SATURATION: 93 % | SYSTOLIC BLOOD PRESSURE: 108 MMHG | BODY MASS INDEX: 46.35 KG/M2 | RESPIRATION RATE: 18 BRPM | HEART RATE: 87 BPM | DIASTOLIC BLOOD PRESSURE: 54 MMHG

## 2019-02-15 LAB
HGB BLD-MCNC: 9.8 G/DL (ref 11.7–15.7)
PLATELET # BLD AUTO: 214 10E9/L (ref 150–450)

## 2019-02-15 PROCEDURE — 36415 COLL VENOUS BLD VENIPUNCTURE: CPT | Performed by: ORTHOPAEDIC SURGERY

## 2019-02-15 PROCEDURE — 97530 THERAPEUTIC ACTIVITIES: CPT | Mod: GP | Performed by: PHYSICAL THERAPIST

## 2019-02-15 PROCEDURE — L3260 AMBULATORY SURGICAL BOOT EAC: HCPCS

## 2019-02-15 PROCEDURE — L1951 AFO SPIRAL PREFABRICATED: HCPCS

## 2019-02-15 PROCEDURE — 85049 AUTOMATED PLATELET COUNT: CPT | Performed by: ORTHOPAEDIC SURGERY

## 2019-02-15 PROCEDURE — 85018 HEMOGLOBIN: CPT | Performed by: ORTHOPAEDIC SURGERY

## 2019-02-15 PROCEDURE — 25000132 ZZH RX MED GY IP 250 OP 250 PS 637: Performed by: ORTHOPAEDIC SURGERY

## 2019-02-15 PROCEDURE — 25000128 H RX IP 250 OP 636: Performed by: ORTHOPAEDIC SURGERY

## 2019-02-15 RX ADMIN — GABAPENTIN 100 MG: 100 CAPSULE ORAL at 07:52

## 2019-02-15 RX ADMIN — OXYCODONE HYDROCHLORIDE 10 MG: 5 TABLET ORAL at 12:52

## 2019-02-15 RX ADMIN — OXYCODONE HYDROCHLORIDE 10 MG: 5 TABLET ORAL at 08:39

## 2019-02-15 RX ADMIN — HYDROXYZINE HYDROCHLORIDE 10 MG: 10 TABLET ORAL at 12:52

## 2019-02-15 RX ADMIN — ACETAMINOPHEN 975 MG: 325 TABLET, FILM COATED ORAL at 12:53

## 2019-02-15 RX ADMIN — SENNOSIDES AND DOCUSATE SODIUM 2 TABLET: 8.6; 5 TABLET ORAL at 07:52

## 2019-02-15 RX ADMIN — ACETAMINOPHEN 975 MG: 325 TABLET, FILM COATED ORAL at 06:30

## 2019-02-15 RX ADMIN — GABAPENTIN 100 MG: 100 CAPSULE ORAL at 12:55

## 2019-02-15 RX ADMIN — ENOXAPARIN SODIUM 40 MG: 40 INJECTION SUBCUTANEOUS at 12:24

## 2019-02-15 RX ADMIN — ACETAMINOPHEN 650 MG: 325 TABLET, FILM COATED ORAL at 01:17

## 2019-02-15 RX ADMIN — OXYCODONE HYDROCHLORIDE 10 MG: 5 TABLET ORAL at 01:17

## 2019-02-15 RX ADMIN — HYDROXYZINE HYDROCHLORIDE 10 MG: 10 TABLET ORAL at 07:52

## 2019-02-15 RX ADMIN — CITALOPRAM HYDROBROMIDE 20 MG: 20 TABLET ORAL at 07:52

## 2019-02-15 ASSESSMENT — ACTIVITIES OF DAILY LIVING (ADL)
ADLS_ACUITY_SCORE: 24
ADLS_ACUITY_SCORE: 27

## 2019-02-15 NOTE — PLAN OF CARE
Discharge Planner PT   Patient plan for discharge: TCU  Current status: Pt completes supine>sit with Antonio at LE and cues for technique. Significant time and reassurance provided for transfer. Pt completes sit<>stand with min-modAx2 and cues for technique. Pt very anxious with transfer, requires cues for breathing, posture, and WB through LE. Pt completes pivot transfer to commode, then to  with Ax2 and FWW. Pt with AFO in place throughout session, however unable/willing to take steps to assess how effective it is. Pt very anxious throughout 2/2 pain/expectation of pain.   Barriers to return to prior living situation: Requires Ax2, unable to ambulate  Recommendations for discharge: TCU  Rationale for recommendations: Pt is not currently at baseline for mobility, and is unsafe to discharge home. With continued PT, both IP and after discharge, pt is likely to obtain mobility goals.        Entered by: Irma Mcclendon 02/15/2019 12:01 PM         Physical Therapy Discharge Summary    Reason for therapy discharge:    Discharged to transitional care facility.    Progress towards therapy goal(s). See goals on Care Plan in [facility] electronic health record for goal details.  Goals not met.  Barriers to achieving goals:   discharge from facility.    Therapy recommendation(s):    Continued therapy is recommended.  Rationale/Recommendations:  Progress indep with mobility at TCU.

## 2019-02-15 NOTE — DISCHARGE SUMMARY
[facility]  Orthopedics Progress Note             Interval History:     Increased pain since yesterday.  Left foot drop has not changed significantly  Pain: Reported to be significant however  Nausea: None light headedness : none  Chest pain: none  Shortness of breath: none                  Physical Exam:     Patient Vitals for the past 12 hrs:   BP Temp Temp src Heart Rate Resp SpO2   02/15/19 0736 108/54 97.6  F (36.4  C) -- 65 18 93 %   02/15/19 0202 -- -- -- -- 16 --   02/15/19 0111 111/59 97.6  F (36.4  C) Oral 72 17 91 %   02/14/19 2150 -- -- -- -- 16 --     Hemoglobin   Date Value Ref Range Status   02/15/2019 9.8 (L) 11.7 - 15.7 g/dL Final   02/14/2019 10.2 (L) 11.7 - 15.7 g/dL Final       Patient is alert and oriented.  Vitals: stable    Dressing: clean and dry over the incision.  According to the nursing report, the incision was healing well without any drainage.  No significant ecchymosis surrounding the incision site is noted  The soft tissues not tense  She has active movement of the hip  she has active extension and flexion at the knee  She has active plantarflexion of the ankle  She has grade 2 dorsiflexion of the ankle and the toes   Calves: soft and non tender             Assessment and Plan:   Status post left total hip arthroplasty, postop day 3  Left foot drop  Acute blood loss anemia  Morbid obesity  Postoperative pain      Increasing pain possibly from increased activities  No clinical evidence of hematoma causing the common peroneal nerve apraxia  From the fact that she had active dorsiflexion day after the surgery and with the presence of at least grade 2 motor function, the prognosis was felt to be more positive.  She is to be fitted with an AFO  Use of the AFO was informed to maintained dorsiflexion of the ankle  She is to go to a TCU today and decide when she will go home based on her overall comfort level.  She will return to clinic as scheduled preoperatively.       Asa  Ruma Rodriguez MD  2/15/2019

## 2019-02-15 NOTE — PLAN OF CARE
A&O. VSS. Left foot drop and numbness, Ortho aware. Pain managed with oxycodone and tylenol. Comfortable, wanted only tylenol this morning. Ice utilized for hip. Assist of 2 with lata steady and gait belt. Tearful and anxious about getting up to bedside commode, needed a lot of encouragement. Calm as long as she does not need to get up. Will continue to monitor.

## 2019-02-15 NOTE — PLAN OF CARE
Easily anxious/tearful often, encouraged expression of feelings, explained POC, answered all q's, encouraged relaxation/distractions.  Pain managed with oral pain meds + cold.  LS clear bilaterally, RA, needs freq. encouragements hourly CDB/IS x10.  CMS + except L foot numbed, footdrop unable to plantarflexion can still wiggles toes; baseline numbness R hand fr. carpal tunnel.  BLEs edematous, elevated on pillows.  Drsg CDI.  Up with mod-max A2 belt + walker to BSC, pt gets very anxious/fearful needs freq. redirecting.  Declined sitting up in chair, needs encouragements.  Voided x1 encouraged increased fluids, bladder scanned only 104ml end of shift.  Anticipated TCU upon DC.        Orthotist came but unable to fit without shoe, will return tomorrow to fit for AFO brace.

## 2019-02-15 NOTE — PROGRESS NOTES
SWS     D: Discharge planning continuing.. [facility] able to accept today, noted per ortho PA the planning for pt's discharge today.       I: Met with pt and discussed above, she has asked that planning continue for her transfer to [facility] and has requested that medical transport (w/c) be arranged. Discussed with her that her [facility] does not cover the w/c transport, discussed cost of $75/base and $5/mi which she acknowledges and accepts. [facility] arranged for 1400, additional questions of pt were addressed.      A/P: Anticipate no problem with arrangements made for transfer today, with discharge no further SWS.      .PAS-RR    D: Per DHS regulation, TONY completed and submitted PAS-RR to [place] [employer] Direct Connect via the [employer] Link[employer] Line.  PAS-RR confirmation # is : [id number]      .

## 2019-02-15 NOTE — PROGRESS NOTES
Patient was fit with an off the shelf afo.  I had to provide patient with a post op shoe because of no shoes. Function of the orthosis for the patient cannot be determined because of patient's progress.  Difficulty getting to stand and patient not progressing her left foot forward because of trepidation of pain.  Patient reports no impingement.  Patient to contact our office if questions.  Keven DOOLEY.

## 2019-02-16 ENCOUNTER — MYC MEDICAL ADVICE (OUTPATIENT)
Dept: ORTHOPEDICS | Facility: CLINIC | Age: 74
End: 2019-02-16

## 2019-02-16 ENCOUNTER — TRANSFERRED RECORDS (OUTPATIENT)
Dept: HEALTH INFORMATION MANAGEMENT | Facility: CLINIC | Age: 74
End: 2019-02-16

## 2019-02-17 VITALS
RESPIRATION RATE: 28 BRPM | SYSTOLIC BLOOD PRESSURE: 112 MMHG | HEART RATE: 69 BPM | OXYGEN SATURATION: 96 % | TEMPERATURE: 98 F | BODY MASS INDEX: 47.38 KG/M2 | WEIGHT: 293 LBS | DIASTOLIC BLOOD PRESSURE: 62 MMHG

## 2019-02-18 ENCOUNTER — PATIENT OUTREACH (OUTPATIENT)
Dept: CARE COORDINATION | Facility: CLINIC | Age: 74
End: 2019-02-18

## 2019-02-18 ENCOUNTER — NURSING HOME VISIT (OUTPATIENT)
Dept: GERIATRICS | Facility: CLINIC | Age: 74
End: 2019-02-18
Payer: COMMERCIAL

## 2019-02-18 DIAGNOSIS — D62 ANEMIA DUE TO BLOOD LOSS, ACUTE: ICD-10-CM

## 2019-02-18 DIAGNOSIS — M16.12 PRIMARY OSTEOARTHRITIS OF LEFT HIP: Primary | ICD-10-CM

## 2019-02-18 DIAGNOSIS — Z96.642 AFTERCARE FOLLOWING LEFT HIP JOINT REPLACEMENT SURGERY: ICD-10-CM

## 2019-02-18 DIAGNOSIS — Z47.1 AFTERCARE FOLLOWING LEFT HIP JOINT REPLACEMENT SURGERY: ICD-10-CM

## 2019-02-18 DIAGNOSIS — I10 HTN, GOAL BELOW 140/90: ICD-10-CM

## 2019-02-18 DIAGNOSIS — K59.03 DRUG-INDUCED CONSTIPATION: ICD-10-CM

## 2019-02-18 DIAGNOSIS — R22.1 NECK MASS: ICD-10-CM

## 2019-02-18 PROCEDURE — 99310 SBSQ NF CARE HIGH MDM 45: CPT | Performed by: NURSE PRACTITIONER

## 2019-02-18 ASSESSMENT — ACTIVITIES OF DAILY LIVING (ADL): DEPENDENT_IADLS:: COOKING;CLEANING;LAUNDRY;SHOPPING;MEAL PREPARATION;TRANSPORTATION

## 2019-02-18 NOTE — PROGRESS NOTES
Clinic Care Coordination Contact  Care Coordination Transition Communication    Referral Source: IP Report    Clinical Data: Status post left total hip arthroplasty, postop day 3  Left foot drop  Acute blood loss anemia  Morbid obesity  Postoperative pain        Increasing pain possibly from increased activities  No clinical evidence of hematoma causing the common peroneal nerve apraxia  From the fact that she had active dorsiflexion day after the surgery and with the presence of at least grade 2 motor function, the prognosis was felt to be more positive.  She is to be fitted with an AFO  Use of the AFO was informed to maintained dorsiflexion of the ankle  She is to go to a TCU today and decide when she will go home based on her overall comfort level.  She will return to clinic as scheduled preoperatively.    Transition to Facility:              Facility Name: [facility]                 Plan:  Care Coordinator will await notification from facility staff informing  Care Coordinator of patient's discharge plans/needs. SW Care Coordinator will review chart and outreach to facility staff every 4 weeks and as needed.     Francisca Hernandez,   [facility]  [email or web address]  [phone]

## 2019-02-18 NOTE — PROGRESS NOTES
[facility]  PRIMARY CARE PROVIDER AND CLINIC:  Daley Manoj Jones [address] / [place] [address]  Chief Complaint   Patient presents with     Hospital F/U     [facility] Medical Record Number:  [id number]  Place of Service where encounter took place:  [facility] ([facility]) [[id number]]    HPI:    Cristy Bailey is a 73 year old  (1945), PMH of HTN, HLD, obesity who presents for elective left JENAE  admitted to the above facility from  [facility].  Hospital stay 2/12/19 through 2/15/19.  DJD s/p left JENAE: Dr. Rodriguez, after surgery noted left foot drop  Anemia: acute, Hgb 9.8 no tranfusion required  Left neck mass: US done during hospitalization, lipoma in differential diagnosis, recommend OP MRI   Anxiety: anxious about left  Neck massAdmitted to this facility for  rehab, medical management and nursing care.  HPI information obtained from: facility chart records, facility staff, patient report and [facility] chart review.  Current issues are:      On exam today patient sitting up in WC, alert, states she is having no pain in left hip, has chronic pain in left calf and foot pain, states it has been there for over a year, no change in pain with surgery but she has a new foot drop, she is wearing a  Brace, denies fever, chills, cough, congestion, SOB, N/V/D or constipation, no other concerns at time of exam.   Last 3 BPs: 112/62, 98/61, 107/64 mmHg  HR Ranges: 69-85 bpm  Admission Weight: 2/15/19: 293.4 lbs    CODE STATUS/ADVANCE DIRECTIVES DISCUSSION:   CPR/Full code   Patient's living condition: significant other    ALLERGIES:No known drug allergies  PAST MEDICAL HISTORY:  has a past medical history of Arthritis, HTN (hypertension) (1/2010 ), Leg weakness (3/24/2015), and Lyme disease (1980'S AND 2004). She also has no past medical history of Chronic infection, Complication of anesthesia, History of blood transfusion, Malignant hyperthermia, Other chronic pain, or Sleep  apnea.  PAST SURGICAL HISTORY:  has a past surgical history that includes C-SEC ONLY,PREV C-SEC; biopsy of uterus lining (3/2010); Colonoscopy (2/21/10); Arthroplasty hip (Right, 7/30/2018); Incision and drainage hip, combined (Right, 8/29/2018); orthopedic surgery (Right, 08/29/2018); and Arthroplasty hip (Left, 2/12/2019).  FAMILY HISTORY: family history includes Alcohol/Drug in her brother; C.A.D. in her brother; Cerebrovascular Disease in her father; Diabetes in her brother; Family History Negative in her brother, brother, daughter, daughter, mother, sister, sister, and son; Heart Disease in her brother; Unknown/Adopted in her mother.  SOCIAL HISTORY:  reports that she quit smoking about 34 years ago. she has never used smokeless tobacco. She reports that she does not drink alcohol or use drugs.    Post Discharge Medication Reconciliation Status: discharge medications reconciled, continue medications without change.  Current Outpatient Medications   Medication Sig Dispense Refill     acetaminophen (TYLENOL) 325 MG tablet Take 3 tablets (975 mg) by mouth every 8 hours 100 tablet 2     aspirin (ASA) 325 MG tablet Take 1 tablet (325 mg) by mouth daily 30 tablet 0     citalopram (CELEXA) 20 MG tablet Take 20 mg by mouth daily       gabapentin (NEURONTIN) 300 MG capsule Take 1 tablet PO in the morning, 1 tablet PO midday, and 2 tablets PO at night. 180 capsule 1     methocarbamol (ROBAXIN) 500 MG tablet Take 1 tablet (500 mg) by mouth 4 times daily as needed for muscle spasms       senna-docusate (SENOKOT-S/PERICOLACE) 8.6-50 MG tablet Take 1 tablet by mouth 2 times daily as needed for constipation 60 tablet 0       ROS:  10 point ROS of systems including Constitutional, Eyes, Respiratory, Cardiovascular, Gastroenterology, Genitourinary, Integumentary, Musculoskeletal, Psychiatric were all negative except for pertinent positives noted in my HPI.    Exam:  /62   Pulse 69   Temp 98  F (36.7  C)   Resp 28   Wt  133.1 kg (293 lb 6.4 oz)   SpO2 96%   BMI 47.38 kg/m    GENERAL APPEARANCE:  Alert, in no distress, morbidly obese  ENT:  Mouth and posterior oropharynx normal, moist mucous membranes, normal hearing acuity  EYES:  EOM, conjunctivae, lids, pupils and irises normal, PERRL  RESP:  respiratory effort and palpation of chest normal, lungs clear to auscultation , no respiratory distress  CV:  Palpation and auscultation of heart done , regular rate and rhythm, no murmur, rub, or gallop, peripheral edema trace+ in LE bilaterally  ABDOMEN:  normal bowel sounds, soft, nontender, no hepatosplenomegaly or other masses  M/S:   Examination of:   right upper extremity, left upper extremity and right lower extremity  Inspection, ROM, stability and muscle strength normal and foot drop LLE, wearing orthotic shoe at time of exam  SKIN:  Inspection of skin and subcutaneous tissue baseline, did not visualize left hip incision  NEURO:   Cranial nerves 2-12 are normal tested and grossly at patient's baseline, speech WNL  PSYCH:  affect and mood normal    Lab/Diagnostic data:  CBC RESULTS:   Recent Labs   Lab Test 02/15/19  0641 02/14/19  0614  02/12/19 2217 08/30/18  0621  04/30/18  1246   WBC  --   --   --   --   --  8.9  --  12.4*   RBC  --   --   --   --   --  3.31*  --  4.82   HGB 9.8* 10.2*   < >  --    < > 9.6*   < > 14.3   HCT  --   --   --   --   --  32.2*  --  43.4   MCV  --   --   --   --   --  97  --  90   MCH  --   --   --   --   --  29.0  --  29.7   MCHC  --   --   --   --   --  29.8*  --  32.9   RDW  --   --   --   --   --  15.6*  --  14.1     --   --  195  --  425   < > 283    < > = values in this interval not displayed.       Last Basic Metabolic Panel:  Recent Labs   Lab Test 02/14/19  0614 02/12/19 2217 01/28/19  1112  08/01/18  0737   NA  --   --  140  --  139   POTASSIUM  --   --  4.2  --  3.8   CHLORIDE  --   --  107  --  106   ASHIA  --   --  9.1  --  7.5*   CO2  --   --  29  --  27   BUN  --   --  20  --   24   CR  --  0.71 0.67  --  0.69   *  --  99   < > 111*    < > = values in this interval not displayed.       ASSESSMENT/PLAN:  Primary osteoarthritis of left hip  Aftercare following left hip joint replacement surgery  Acute/ongoing: PT and OT for strengthening, continue tylenol 975mg q 8 hours, robaxin 500mg QID prn, oxycodone 2.5-5mg q 4 hours prn, ASA 325mg QD    Anemia due to blood loss, acute  Acute/ongoing: Hgb weekly while in TCU    Drug-induced constipation  Acute: continue senna s 1 PO BID, add miralax 17gm QD prn    HTN, goal below 140/90  Ongoing: vitals daily and prn, BMP follow, follow off Rx    Neck mass  Acute: US done in hospital, recommend OP f/u with MRI, will defer to PCP       Orders:  BMP and Hgb on wed  miralax 17gm QD prn    Total time with patient visit: 40 minutes including discussions about the POC and care coordination with the patient. Greater than 50% of total time spent with counseling and coordinating care due to review chart and update orders.    Electronically signed by:  Tonya Lynn Haase, APRN CNP

## 2019-02-18 NOTE — LETTER
2/18/2019        RE: Cristy Bailey  [address]  [facility] [address]        [facility]  PRIMARY CARE PROVIDER AND CLINIC:  Manoj Daley [address] / [place] [address]  Chief Complaint   Patient presents with     Hospital F/U     [place] Medical Record Number:  [id number]  Place of Service where encounter took place:  [facility] ([facility]) [[id number]]    HPI:    Cristy Bailey is a 73 year old  (1945), PMH of HTN, HLD, obesity who presents for elective left JENAE  admitted to the above facility from  [facility].  Hospital stay 2/12/19 through 2/15/19.  DJD s/p left JENAE: Dr. Rodriguez, after surgery noted left foot drop  Anemia: acute, Hgb 9.8 no tranfusion required  Left neck mass: US done during hospitalization, lipoma in differential diagnosis, recommend OP MRI   Anxiety: anxious about left  Neck massAdmitted to this facility for  rehab, medical management and nursing care.  HPI information obtained from: facility chart records, facility staff, patient report and [facility] chart review.  Current issues are:      On exam today patient sitting up in WC, alert, states she is having no pain in left hip, has chronic pain in left calf and foot pain, states it has been there for over a year, no change in pain with surgery but she has a new foot drop, she is wearing a  Brace, denies fever, chills, cough, congestion, SOB, N/V/D or constipation, no other concerns at time of exam.   Last 3 BPs: 112/62, 98/61, 107/64 mmHg  HR Ranges: 69-85 bpm  Admission Weight: 2/15/19: 293.4 lbs    CODE STATUS/ADVANCE DIRECTIVES DISCUSSION:   CPR/Full code   Patient's living condition: significant other    ALLERGIES:No known drug allergies  PAST MEDICAL HISTORY:  has a past medical history of Arthritis, HTN (hypertension) (1/2010 ), Leg weakness (3/24/2015), and Lyme disease (1980'S AND 2004). She also has no past medical history of Chronic infection, Complication  of anesthesia, History of blood transfusion, Malignant hyperthermia, Other chronic pain, or Sleep apnea.  PAST SURGICAL HISTORY:  has a past surgical history that includes C-SEC ONLY,PREV C-SEC; biopsy of uterus lining (3/2010); Colonoscopy (2/21/10); Arthroplasty hip (Right, 7/30/2018); Incision and drainage hip, combined (Right, 8/29/2018); orthopedic surgery (Right, 08/29/2018); and Arthroplasty hip (Left, 2/12/2019).  FAMILY HISTORY: family history includes Alcohol/Drug in her brother; C.A.D. in her brother; Cerebrovascular Disease in her father; Diabetes in her brother; Family History Negative in her brother, brother, daughter, daughter, mother, sister, sister, and son; Heart Disease in her brother; Unknown/Adopted in her mother.  SOCIAL HISTORY:  reports that she quit smoking about 34 years ago. she has never used smokeless tobacco. She reports that she does not drink alcohol or use drugs.    Post Discharge Medication Reconciliation Status: discharge medications reconciled, continue medications without change.  Current Outpatient Medications   Medication Sig Dispense Refill     acetaminophen (TYLENOL) 325 MG tablet Take 3 tablets (975 mg) by mouth every 8 hours 100 tablet 2     aspirin (ASA) 325 MG tablet Take 1 tablet (325 mg) by mouth daily 30 tablet 0     citalopram (CELEXA) 20 MG tablet Take 20 mg by mouth daily       gabapentin (NEURONTIN) 300 MG capsule Take 1 tablet PO in the morning, 1 tablet PO midday, and 2 tablets PO at night. 180 capsule 1     methocarbamol (ROBAXIN) 500 MG tablet Take 1 tablet (500 mg) by mouth 4 times daily as needed for muscle spasms       senna-docusate (SENOKOT-S/PERICOLACE) 8.6-50 MG tablet Take 1 tablet by mouth 2 times daily as needed for constipation 60 tablet 0       ROS:  10 point ROS of systems including Constitutional, Eyes, Respiratory, Cardiovascular, Gastroenterology, Genitourinary, Integumentary, Musculoskeletal, Psychiatric were all negative except for pertinent  positives noted in my HPI.    Exam:  /62   Pulse 69   Temp 98  F (36.7  C)   Resp 28   Wt 133.1 kg (293 lb 6.4 oz)   SpO2 96%   BMI 47.38 kg/m     GENERAL APPEARANCE:  Alert, in no distress, morbidly obese  ENT:  Mouth and posterior oropharynx normal, moist mucous membranes, normal hearing acuity  EYES:  EOM, conjunctivae, lids, pupils and irises normal, PERRL  RESP:  respiratory effort and palpation of chest normal, lungs clear to auscultation , no respiratory distress  CV:  Palpation and auscultation of heart done , regular rate and rhythm, no murmur, rub, or gallop, peripheral edema trace+ in LE bilaterally  ABDOMEN:  normal bowel sounds, soft, nontender, no hepatosplenomegaly or other masses  M/S:   Examination of:   right upper extremity, left upper extremity and right lower extremity  Inspection, ROM, stability and muscle strength normal and foot drop LLE, wearing orthotic shoe at time of exam  SKIN:  Inspection of skin and subcutaneous tissue baseline, did not visualize left hip incision  NEURO:   Cranial nerves 2-12 are normal tested and grossly at patient's baseline, speech WNL  PSYCH:  affect and mood normal    Lab/Diagnostic data:  CBC RESULTS:   Recent Labs   Lab Test 02/15/19  0641 02/14/19  0614 02/12/19 2217 08/30/18  0621 04/30/18  1246   WBC  --   --   --   --   --  8.9  --  12.4*   RBC  --   --   --   --   --  3.31*  --  4.82   HGB 9.8* 10.2*   < >  --    < > 9.6*   < > 14.3   HCT  --   --   --   --   --  32.2*  --  43.4   MCV  --   --   --   --   --  97  --  90   MCH  --   --   --   --   --  29.0  --  29.7   MCHC  --   --   --   --   --  29.8*  --  32.9   RDW  --   --   --   --   --  15.6*  --  14.1     --   --  195  --  425   < > 283    < > = values in this interval not displayed.       Last Basic Metabolic Panel:  Recent Labs   Lab Test 02/14/19 0614 02/12/19 2217 01/28/19  1112  08/01/18  0737   NA  --   --  140  --  139   POTASSIUM  --   --  4.2  --  3.8   CHLORIDE  --    --  107  --  106   ASHIA  --   --  9.1  --  7.5*   CO2  --   --  29  --  27   BUN  --   --  20  --  24   CR  --  0.71 0.67  --  0.69   *  --  99   < > 111*    < > = values in this interval not displayed.       ASSESSMENT/PLAN:  Primary osteoarthritis of left hip  Aftercare following left hip joint replacement surgery  Acute/ongoing: PT and OT for strengthening, continue tylenol 975mg q 8 hours, robaxin 500mg QID prn, oxycodone 2.5-5mg q 4 hours prn, ASA 325mg QD    Anemia due to blood loss, acute  Acute/ongoing: Hgb weekly while in TCU    Drug-induced constipation  Acute: continue senna s 1 PO BID, add miralax 17gm QD prn    HTN, goal below 140/90  Ongoing: vitals daily and prn, BMP follow, follow off Rx    Neck mass  Acute: US done in hospital, recommend OP f/u with MRI, will defer to PCP       Orders:  BMP and Hgb on wed  miralax 17gm QD prn    Total time with patient visit: 40 minutes including discussions about the POC and care coordination with the patient. Greater than 50% of total time spent with counseling and coordinating care due to review chart and update orders.    Electronically signed by:  Tonya Lynn Haase, APRN CNP                    Sincerely,        Tonya Lynn Haase, APRN CNP

## 2019-02-18 NOTE — LETTER
[facility]   To:   [facility]          Please give to facility    From:  Francisca Hernandez  [facility]  Care Coordinator [phone]   [facility]   P: [phone]  [email or web address]   Patient Name:  Shanthi Bailey YOB: 1945   Admit date: 2-      *Information Needed:  Please contact me when the patient will discharge (or if they will move to long term care)- include the discharge date, disposition, and main diagnosis   - If the patient is discharged with home care services, please provide the name of the agency      Phone,  Email with information  Thank you, Francisca  P: [phone]  [email or web address]

## 2019-02-19 ENCOUNTER — TRANSFERRED RECORDS (OUTPATIENT)
Dept: HEALTH INFORMATION MANAGEMENT | Facility: CLINIC | Age: 74
End: 2019-02-19

## 2019-02-19 ENCOUNTER — NURSING HOME VISIT (OUTPATIENT)
Dept: GERIATRICS | Facility: CLINIC | Age: 74
End: 2019-02-19
Payer: COMMERCIAL

## 2019-02-19 ENCOUNTER — MYC MEDICAL ADVICE (OUTPATIENT)
Dept: ORTHOPEDICS | Facility: CLINIC | Age: 74
End: 2019-02-19

## 2019-02-19 DIAGNOSIS — Z47.1 AFTERCARE FOLLOWING LEFT HIP JOINT REPLACEMENT SURGERY: Primary | ICD-10-CM

## 2019-02-19 DIAGNOSIS — D62 ANEMIA DUE TO BLOOD LOSS, ACUTE: ICD-10-CM

## 2019-02-19 DIAGNOSIS — I10 HTN, GOAL BELOW 140/90: ICD-10-CM

## 2019-02-19 DIAGNOSIS — Z96.642 AFTERCARE FOLLOWING LEFT HIP JOINT REPLACEMENT SURGERY: Primary | ICD-10-CM

## 2019-02-19 LAB
BUN SERPL-MCNC: 15 MG/DL (ref 9–26)
CALCIUM SERPL-MCNC: 7.9 MG/DL (ref 8.4–10.4)
CHLORIDE SERPLBLD-SCNC: 105 MMOL/L (ref 98–109)
CO2 SERPL-SCNC: 30 MMOL/L (ref 22–31)
CREAT SERPL-MCNC: 0.63 MG/DL (ref 0.55–1.02)
GFR SERPL CREATININE-BSD FRML MDRD: >60 ML/MIN/1.73M2
GLUCOSE SERPL-MCNC: 95 MG/DL (ref 70–100)
HEMOGLOBIN: 9 G/DL (ref 11.8–15.5)
POTASSIUM SERPL-SCNC: 3.9 MMOL/L (ref 3.5–5.2)
SODIUM SERPL-SCNC: 141 MMOL/L (ref 136–145)

## 2019-02-19 PROCEDURE — 99305 1ST NF CARE MODERATE MDM 35: CPT | Performed by: INTERNAL MEDICINE

## 2019-02-19 NOTE — TELEPHONE ENCOUNTER
Shanthi,   They may be able to discharge you on Friday if you ask and get everything all set up.  If not, you should tell them you are leaving.    We can figure out services at the visit.  We can also rx compression stockings if you do not have them.    David

## 2019-02-20 ENCOUNTER — NURSING HOME VISIT (OUTPATIENT)
Dept: GERIATRICS | Facility: CLINIC | Age: 74
End: 2019-02-20
Payer: COMMERCIAL

## 2019-02-20 VITALS
OXYGEN SATURATION: 95 % | SYSTOLIC BLOOD PRESSURE: 99 MMHG | DIASTOLIC BLOOD PRESSURE: 55 MMHG | RESPIRATION RATE: 16 BRPM | HEART RATE: 78 BPM | TEMPERATURE: 98.7 F | WEIGHT: 293 LBS | BODY MASS INDEX: 47.78 KG/M2

## 2019-02-20 DIAGNOSIS — K59.03 DRUG-INDUCED CONSTIPATION: ICD-10-CM

## 2019-02-20 DIAGNOSIS — I82.402 LEG DVT (DEEP VENOUS THROMBOEMBOLISM), ACUTE, LEFT (H): ICD-10-CM

## 2019-02-20 DIAGNOSIS — D62 ANEMIA DUE TO BLOOD LOSS, ACUTE: ICD-10-CM

## 2019-02-20 DIAGNOSIS — R22.1 NECK MASS: ICD-10-CM

## 2019-02-20 DIAGNOSIS — M16.12 PRIMARY OSTEOARTHRITIS OF LEFT HIP: Primary | ICD-10-CM

## 2019-02-20 DIAGNOSIS — Z96.642 AFTERCARE FOLLOWING LEFT HIP JOINT REPLACEMENT SURGERY: ICD-10-CM

## 2019-02-20 DIAGNOSIS — Z47.1 AFTERCARE FOLLOWING LEFT HIP JOINT REPLACEMENT SURGERY: ICD-10-CM

## 2019-02-20 DIAGNOSIS — I10 HTN, GOAL BELOW 140/90: ICD-10-CM

## 2019-02-20 PROCEDURE — 99310 SBSQ NF CARE HIGH MDM 45: CPT | Performed by: NURSE PRACTITIONER

## 2019-02-20 RX ORDER — POLYETHYLENE GLYCOL 3350 17 G/17G
1 POWDER, FOR SOLUTION ORAL DAILY PRN
COMMUNITY
End: 2019-08-30

## 2019-02-20 RX ORDER — CITALOPRAM HYDROBROMIDE 20 MG/1
20 TABLET ORAL DAILY
Status: CANCELLED | OUTPATIENT
Start: 2019-02-20

## 2019-02-20 RX ORDER — OXYCODONE HYDROCHLORIDE 5 MG/1
TABLET ORAL
COMMUNITY
End: 2019-05-17

## 2019-02-20 NOTE — PROGRESS NOTES
[place] [facility]    Chief Complaint   Patient presents with     RECHECK       [place] Medical Record Number:  [id number]  Place of Service where encounter took place:  [facility] ([facility]) [[id number]]    HPI:    Cristy Bailey is a 73 year old  (1945), who is being seen today for an episodic care visit.  HPI information obtained from: facility chart records, facility staff, patient report and [facility] chart review.Today's concern is:  Left JENAE: on exam today patient denies pain in hip, she is working with therapy  Left LE DVT: doppler shows nonocclusive thrombosis posterior tibial vein. On exam today patient c/o pain in left calf, states she has chronic pain in left calf, rates pain as 7/10 at time of exam, does have edema left calf  Anemia: see labs  Constipation: states has had a BM  HTN: Last 3 BPs: 99/55, 110/54, 120/54 mmHg  HR Ranges: 66-80 bpm denies CP, palpitations  Admission Weight: 2/15/19: 293.4 lbs  Current Weights: 2/18/19: 295.9 lbs    ALLERGIES: No known drug allergies  Past Medical, Surgical, Family and Social History reviewed and updated in [facility].    Current Outpatient Medications   Medication Sig Dispense Refill     acetaminophen (TYLENOL) 325 MG tablet Take 3 tablets (975 mg) by mouth every 8 hours 100 tablet 2     aspirin (ASA) 325 MG tablet Take 1 tablet (325 mg) by mouth daily 30 tablet 0     citalopram (CELEXA) 20 MG tablet Take 20 mg by mouth daily       gabapentin (NEURONTIN) 300 MG capsule Take 1 tablet PO in the morning, 1 tablet PO midday, and 2 tablets PO at night. 180 capsule 1     methocarbamol (ROBAXIN) 500 MG tablet Take 1 tablet (500 mg) by mouth 4 times daily as needed for muscle spasms       oxyCODONE (ROXICODONE) 5 MG tablet Give 0.5 tablet by mouth every 4 hours as needed for moderate pain 1-5 out of ten pain AND Give 1 tablet by mouth every 4 hours as needed for severe pain 6-10 level of pain       polyethylene glycol (MIRALAX/GLYCOLAX) packet Take 1  packet by mouth daily as needed for constipation       senna-docusate (SENOKOT-S/PERICOLACE) 8.6-50 MG tablet Take 1 tablet by mouth 2 times daily as needed for constipation 60 tablet 0     Medications reviewed:  Medications reconciled to facility chart and changes were made to reflect current medications as identified as above med list. Below are the changes that were made:   Medications stopped since last EPIC medication reconciliation:   Medications Discontinued During This Encounter   Medication Reason     oxyCODONE (ROXICODONE) 5 MG tablet Therapy completed       Medications started since last [facility] medication reconciliation:  Orders Placed This Encounter   Medications     polyethylene glycol (MIRALAX/GLYCOLAX) packet     Sig: Take 1 packet by mouth daily as needed for constipation     oxyCODONE (ROXICODONE) 5 MG tablet     Sig: Give 0.5 tablet by mouth every 4 hours as needed for moderate pain 1-5 out of ten pain AND Give 1 tablet by mouth every 4 hours as needed for severe pain 6-10 level of pain         REVIEW OF SYSTEMS:  10 point ROS of systems including Constitutional, Eyes, Respiratory, Cardiovascular, Gastroenterology, Genitourinary, Integumentary, Musculoskeletal, Psychiatric were all negative except for pertinent positives noted in my HPI.    Physical Exam:  BP 99/55   Pulse 78   Temp 98.7  F (37.1  C)   Resp 16   Wt 134.2 kg (295 lb 14.4 oz)   SpO2 95%   BMI 47.78 kg/m    GENERAL APPEARANCE:  Alert, in no distress, morbidly obese  ENT:  Mouth and posterior oropharynx normal, moist mucous membranes, normal hearing acuity  EYES:  EOM, conjunctivae, lids, pupils and irises normal, PERRL  RESP:  respiratory effort and palpation of chest normal, lungs clear to auscultation , no respiratory distress  CV:  Palpation and auscultation of heart done , regular rate and rhythm, no murmur, rub, or gallop, peripheral edema trace 1+ in LLE and trace RLE  ABDOMEN:  normal bowel sounds, soft, nontender, no  hepatosplenomegaly or other masses  M/S:   Examination of:   right upper extremity, left upper extremity and right lower extremity  Inspection, ROM, stability and muscle strength normal and foot drop LLE, wearing orthotic shoe at time of exam  SKIN:  Inspection of skin and subcutaneous tissue baseline, did not visualize left hip incision  NEURO:   Cranial nerves 2-12 are normal tested and grossly at patient's baseline, speech WNL  PSYCH:  affect and mood normal       Recent Labs:   CBC RESULTS:   Recent Labs   Lab Test 02/19/19 02/15/19  0641  02/12/19 2217 08/30/18  0621  04/30/18  1246   WBC  --   --   --   --   --  8.9  --  12.4*   RBC  --   --   --   --   --  3.31*  --  4.82   HGB 9.0* 9.8*   < >  --    < > 9.6*   < > 14.3   HCT  --   --   --   --   --  32.2*  --  43.4   MCV  --   --   --   --   --  97  --  90   MCH  --   --   --   --   --  29.0  --  29.7   MCHC  --   --   --   --   --  29.8*  --  32.9   RDW  --   --   --   --   --  15.6*  --  14.1   PLT  --  214  --  195  --  425   < > 283    < > = values in this interval not displayed.       Last Basic Metabolic Panel:  Recent Labs   Lab Test 02/19/19 02/14/19  0614 02/12/19 2217 01/28/19  1112     --   --  140   POTASSIUM 3.9  --   --  4.2   CHLORIDE 105  --   --  107   ASHIA 7.9*  --   --  9.1   CO2 30  --   --  29   BUN 15  --   --  20   CR 0.63  --  0.71 0.67   GLC 95 116*  --  99             Assessment/Plan:  Primary osteoarthritis of left hip  Aftercare following left hip joint replacement surgery  Acute/ongoing: PT and OT for strengthening, continue tylenol 975mg q 8 hours, robaxin 500mg QID prn, oxycodone 2.5-5mg q 4 hours prn, ASA 325mg QD    Acute DVT Left posterior tibial vein  Acute: lovenox 130mg SQ q 12 hours, start coumadin 1mg PO today, stop lovenox once INR reaches 2-3     Anemia due to blood loss, acute  Acute/ongoing: Hgb weekly while in TCU     Drug-induced constipation  Acute: continue senna s 1 PO BID, add miralax 17gm QD  prn     HTN, goal below 140/90  Ongoing: vitals daily and prn, BMP follow, follow off Rx     Neck mass  Acute: US done in hospital, recommend OP f/u with MRI, will defer to PCP    Orders:  Lovenox 130mg SQ q 12  Coumadin 1mg today and check INR in AM  Continue lovenox until INR 2-3    Electronically signed by  Tonya Lynn Haase, APRN CNP

## 2019-02-20 NOTE — LETTER
2/20/2019        RE: Cristy Bailey  [address]  [facility] [address]        [place] [facility]    Chief Complaint   Patient presents with     RECHECK       [place] Medical Record Number:  [id number]  Place of Service where encounter took place:  [facility] ([facility]) [[id number]]    HPI:    Cristy Bailey is a 73 year old  (1945), who is being seen today for an episodic care visit.  HPI information obtained from: facility chart records, facility staff, patient report and [facility] chart review.Today's concern is:  Left JENAE: on exam today patient denies pain in hip, she is working with therapy  Left LE DVT: doppler shows nonocclusive thrombosis posterior tibial vein. On exam today patient c/o pain in left calf, states she has chronic pain in left calf, rates pain as 7/10 at time of exam, does have edema left calf  Anemia: see labs  Constipation: states has had a BM  HTN: Last 3 BPs: 99/55, 110/54, 120/54 mmHg  HR Ranges: 66-80 bpm denies CP, palpitations  Admission Weight: 2/15/19: 293.4 lbs  Current Weights: 2/18/19: 295.9 lbs    ALLERGIES: No known drug allergies  Past Medical, Surgical, Family and Social History reviewed and updated in [facility].    Current Outpatient Medications   Medication Sig Dispense Refill     acetaminophen (TYLENOL) 325 MG tablet Take 3 tablets (975 mg) by mouth every 8 hours 100 tablet 2     aspirin (ASA) 325 MG tablet Take 1 tablet (325 mg) by mouth daily 30 tablet 0     citalopram (CELEXA) 20 MG tablet Take 20 mg by mouth daily       gabapentin (NEURONTIN) 300 MG capsule Take 1 tablet PO in the morning, 1 tablet PO midday, and 2 tablets PO at night. 180 capsule 1     methocarbamol (ROBAXIN) 500 MG tablet Take 1 tablet (500 mg) by mouth 4 times daily as needed for muscle spasms       oxyCODONE (ROXICODONE) 5 MG tablet Give 0.5 tablet by mouth every 4 hours as needed for moderate pain 1-5 out of ten pain AND Give 1 tablet by mouth every 4 hours as  needed for severe pain 6-10 level of pain       polyethylene glycol (MIRALAX/GLYCOLAX) packet Take 1 packet by mouth daily as needed for constipation       senna-docusate (SENOKOT-S/PERICOLACE) 8.6-50 MG tablet Take 1 tablet by mouth 2 times daily as needed for constipation 60 tablet 0     Medications reviewed:  Medications reconciled to facility chart and changes were made to reflect current medications as identified as above med list. Below are the changes that were made:   Medications stopped since last EPIC medication reconciliation:   Medications Discontinued During This Encounter   Medication Reason     oxyCODONE (ROXICODONE) 5 MG tablet Therapy completed       Medications started since last [facility] medication reconciliation:  Orders Placed This Encounter   Medications     polyethylene glycol (MIRALAX/GLYCOLAX) packet     Sig: Take 1 packet by mouth daily as needed for constipation     oxyCODONE (ROXICODONE) 5 MG tablet     Sig: Give 0.5 tablet by mouth every 4 hours as needed for moderate pain 1-5 out of ten pain AND Give 1 tablet by mouth every 4 hours as needed for severe pain 6-10 level of pain         REVIEW OF SYSTEMS:  10 point ROS of systems including Constitutional, Eyes, Respiratory, Cardiovascular, Gastroenterology, Genitourinary, Integumentary, Musculoskeletal, Psychiatric were all negative except for pertinent positives noted in my HPI.    Physical Exam:  BP 99/55   Pulse 78   Temp 98.7  F (37.1  C)   Resp 16   Wt 134.2 kg (295 lb 14.4 oz)   SpO2 95%   BMI 47.78 kg/m     GENERAL APPEARANCE:  Alert, in no distress, morbidly obese  ENT:  Mouth and posterior oropharynx normal, moist mucous membranes, normal hearing acuity  EYES:  EOM, conjunctivae, lids, pupils and irises normal, PERRL  RESP:  respiratory effort and palpation of chest normal, lungs clear to auscultation , no respiratory distress  CV:  Palpation and auscultation of heart done , regular rate and rhythm, no murmur, rub, or gallop,  peripheral edema trace  1+ in  LLE and trace RLE  ABDOMEN:  normal bowel sounds, soft, nontender, no hepatosplenomegaly or other masses  M/S:   Examination of:   right upper extremity, left upper extremity and right lower extremity  Inspection, ROM, stability and muscle strength normal and foot drop LLE, wearing orthotic shoe at time of exam  SKIN:  Inspection of skin and subcutaneous tissue baseline, did not visualize left hip incision  NEURO:   Cranial nerves 2-12 are normal tested and grossly at patient's baseline, speech WNL  PSYCH:  affect and mood normal       Recent Labs:   CBC RESULTS:   Recent Labs   Lab Test 02/19/19 02/15/19  0641  02/12/19 2217 08/30/18  0621  04/30/18  1246   WBC  --   --   --   --   --  8.9  --  12.4*   RBC  --   --   --   --   --  3.31*  --  4.82   HGB 9.0* 9.8*   < >  --    < > 9.6*   < > 14.3   HCT  --   --   --   --   --  32.2*  --  43.4   MCV  --   --   --   --   --  97  --  90   MCH  --   --   --   --   --  29.0  --  29.7   MCHC  --   --   --   --   --  29.8*  --  32.9   RDW  --   --   --   --   --  15.6*  --  14.1   PLT  --  214  --  195  --  425   < > 283    < > = values in this interval not displayed.       Last Basic Metabolic Panel:  Recent Labs   Lab Test 02/19/19 02/14/19  0614 02/12/19 2217 01/28/19  1112     --   --  140   POTASSIUM 3.9  --   --  4.2   CHLORIDE 105  --   --  107   ASHIA 7.9*  --   --  9.1   CO2 30  --   --  29   BUN 15  --   --  20   CR 0.63  --  0.71 0.67   GLC 95 116*  --  99             Assessment/Plan:  Primary osteoarthritis of left hip  Aftercare following left hip joint replacement surgery  Acute/ongoing: PT and OT for strengthening, continue tylenol 975mg q 8 hours, robaxin 500mg QID prn, oxycodone 2.5-5mg q 4 hours prn, ASA 325mg QD    Acute DVT Left posterior tibial vein  Acute: lovenox 130mg SQ q 12 hours, start coumadin 1mg PO today, stop lovenox once INR reaches 2-3     Anemia due to blood loss, acute  Acute/ongoing: Hgb weekly while  in TCU     Drug-induced constipation  Acute: continue senna s 1 PO BID, add miralax 17gm QD prn     HTN, goal below 140/90  Ongoing: vitals daily and prn, BMP follow, follow off Rx     Neck mass  Acute: US done in hospital, recommend OP f/u with MRI, will defer to PCP    Orders:  Lovenox 130mg SQ q 12  Coumadin 1mg today and check INR in AM  Continue lovenox until INR 2-3    Electronically signed by  Tonya Lynn Haase, APRN CNP                      Sincerely,        Tonya Lynn Haase, APRN CNP

## 2019-02-22 ENCOUNTER — OFFICE VISIT (OUTPATIENT)
Dept: ORTHOPEDICS | Facility: CLINIC | Age: 74
End: 2019-02-22
Payer: COMMERCIAL

## 2019-02-22 DIAGNOSIS — Z47.89 ORTHOPEDIC AFTERCARE: Primary | ICD-10-CM

## 2019-02-22 PROCEDURE — 99024 POSTOP FOLLOW-UP VISIT: CPT | Performed by: PHYSICIAN ASSISTANT

## 2019-02-22 NOTE — PROGRESS NOTES
HISTORY OF PRESENT ILLNESS:    Cristy Bailey is a 73 year old female who is seen in follow up for Left JENAE, DOS 2/12/19, Dr. Rodriguez.    Pt presents from [facility] in [place] with friend Leo, no documentation present.       Phone call to TCU:  Taking Gabapentin, tylenol, lovenox, and Oxycodone 5-10 mg Q 4 hrs, and Robaxin PRN.  Present symptoms: Pt reports lots of pain in lower left leg.  Minimal at hip and RLE.  Taking dilaudid.  Reports diagnosed with blood clot left lower leg, and on lovenox injections.  States can transfer herself with help. Overall is very anxious about situation and tears throughout the visit.  Denies Chest pain, Calve pain, Fever, Chills.    PHYSICAL EXAM:  There were no vitals taken for this visit.  There is no height or weight on file to calculate BMI.   GENERAL APPEARANCE: healthy, alert and no distress   PSYCH:  mentation appears normal and affect normal/bright    MSK:  Left: Hip .  Ambulates: presents in WC.  Incision clean and dry, staples under intact aquacell present, healing.  Appropriate incisional erythema.   Yes Ecchymosis mild resolving.  No calve pain on palpation.  Edema Moderate to severe lower leg and foot and at hip with pitting.  CMS: joesph incisional numbness, otherwise grossly intact.      IMAGING INTERPRETATION:  None today.     ASSESSMENT:  Cristy Bailey is a 73 year old female S/P  Left JENAE, DOS 2/12/19, Dr. Rodriguez.    Pain most likely from VTE vs Lymphedema combined with anxiety.      PLAN:  - Surgery discussed, images reviewed if applicable, and all questions were answered at this time.  - staples removed with sterile technique, steri-strips applied in usual fashion, care instructions given and verbally acknowledged.  - Medications: VTE tx continued 3 months.  - Hip restrictions.  - Lymphedema tx ordered for TCU.    Return to clinic 2, weeks.    Donal Grover PA-C    Dept. Orthopedic Surgery  [facility]   2/22/2019

## 2019-02-22 NOTE — PROGRESS NOTES
[facility]  PRIMARY CARE PROVIDER AND CLINIC:  DaleyManoj [address] [address] / [place] [address]      Pt was seen by Dr Mccartney on 2/19/19 for an initial TCU visit      HPI:    Cristy Bailey is a 73 year old  (1945), PMH of HTN, obesity who underwent an elective left JENAE on 2/12/19 at [facility]    Pt was noted to have  L foot drop post op, which has not significantly improved.  Hgb was 9.8 post op    Admitted to this facility for  rehab, medical management and nursing care.    Pt c/o L calf pain and swelling, notes continued L foot drop. She states calf pain was  present prior to surgery. She has been limited in therapy secondary to pain L hip and calf and foot drop        CODE STATUS/ADVANCE DIRECTIVES DISCUSSION:   CPR/Full code     Patient's living condition: significant other    ALLERGIES:No known drug allergies  PAST MEDICAL HISTORY:  has a past medical history of Arthritis, HTN (hypertension) (1/2010 ), Leg weakness (3/24/2015), and Lyme disease (1980'S AND 2004). She also has no past medical history of Chronic infection, Complication of anesthesia, History of blood transfusion, Malignant hyperthermia, Other chronic pain, or Sleep apnea.  PAST SURGICAL HISTORY:  has a past surgical history that includes C-SEC ONLY,PREV C-SEC; biopsy of uterus lining (3/2010); Colonoscopy (2/21/10); Arthroplasty hip (Right, 7/30/2018); Incision and drainage hip, combined (Right, 8/29/2018); orthopedic surgery (Right, 08/29/2018); and Arthroplasty hip (Left, 2/12/2019).  FAMILY HISTORY: family history includes Alcohol/Drug in her brother; C.A.D. in her brother; Cerebrovascular Disease in her father; Diabetes in her brother; Family History Negative in her brother, brother, daughter, daughter, mother, sister, sister, and son; Heart Disease in her brother; Unknown/Adopted in her mother.  SOCIAL HISTORY:  reports that she quit smoking about 34 years ago. she has never used smokeless tobacco.  She reports that she does not drink alcohol or use drugs.    Post Discharge Medication Reconciliation Status:   .  Current Outpatient Medications   Medication Sig Dispense Refill     acetaminophen (TYLENOL) 325 MG tablet Take 3 tablets (975 mg) by mouth every 8 hours 100 tablet 2     aspirin (ASA) 325 MG tablet Take 1 tablet (325 mg) by mouth daily 30 tablet 0     citalopram (CELEXA) 20 MG tablet Take 20 mg by mouth daily       gabapentin (NEURONTIN) 300 MG capsule Take 1 tablet PO in the morning, 1 tablet PO midday, and 2 tablets PO at night. 180 capsule 1     methocarbamol (ROBAXIN) 500 MG tablet Take 1 tablet (500 mg) by mouth 4 times daily as needed for muscle spasms       oxyCODONE (ROXICODONE) 5 MG tablet Give 0.5 tablet by mouth every 4 hours as needed for moderate pain 1-5 out of ten pain AND Give 1 tablet by mouth every 4 hours as needed for severe pain 6-10 level of pain       polyethylene glycol (MIRALAX/GLYCOLAX) packet Take 1 packet by mouth daily as needed for constipation       senna-docusate (SENOKOT-S/PERICOLACE) 8.6-50 MG tablet Take 1 tablet by mouth 2 times daily as needed for constipation 60 tablet 0       ROS:  10 point ROS except as noted above    Exam:    GENERAL APPEARANCE:  Alert, in no distress, anxious, obese  ENT: moist mucous membranes  EYES:  EOM, conjunctivae, lids nl  RESP:  Lungs clear  CV: RRR  ABDOMEN:  Soft, non-tender  M/S:  L hip incision was not examined.  2 + edema L calf to foot. + tenderness with palpation L post calf.  NEURO:   Cranial nerves 2-12 are intact. Pt is able to dorsiflex L foot slightly  PSYCH:  Fully oriented, pleasant, anxious    Lab/Diagnostic data:  CBC RESULTS:   Recent Labs   Lab Test 02/15/19  0641 02/14/19  0614  02/12/19  2217  08/30/18  0621  04/30/18  1246   WBC  --   --   --   --   --  8.9  --  12.4*   RBC  --   --   --   --   --  3.31*  --  4.82   HGB 9.8* 10.2*   < >  --    < > 9.6*   < > 14.3   HCT  --   --   --   --   --  32.2*  --  43.4   MCV   --   --   --   --   --  97  --  90   MCH  --   --   --   --   --  29.0  --  29.7   MCHC  --   --   --   --   --  29.8*  --  32.9   RDW  --   --   --   --   --  15.6*  --  14.1     --   --  195  --  425   < > 283    < > = values in this interval not displayed.       Last Basic Metabolic Panel:  Recent Labs   Lab Test 02/14/19  0614 02/12/19  2217 01/28/19  1112  08/01/18  0737   NA  --   --  140  --  139   POTASSIUM  --   --  4.2  --  3.8   CHLORIDE  --   --  107  --  106   ASHIA  --   --  9.1  --  7.5*   CO2  --   --  29  --  27   BUN  --   --  20  --  24   CR  --  0.71 0.67  --  0.69   *  --  99   < > 111*    < > = values in this interval not displayed.       ASSESSMENT/PLAN:    Primary osteoarthritis of left hip  Aftercare following left hip joint   L calf edema and tenderness  L foot drop post op  Plan Venous doppler L calf  PT/OT. AFO brace L. ASA for DVT proph  Bowel regimen    Anemia due to blood loss, acute  Plan monitor Hgb, symptoms    HTN, goal below 140/90  Stable  Ongoing: vitals daily and prn,  follow off Rx    Manoj Mccartney MD

## 2019-02-22 NOTE — PATIENT INSTRUCTIONS
INSTRUCTIONS AND ORDERS:    Incision care instructions:  Keep dry 24 hours.  Showering is ok after that time, however no soaking or scrubbing of incision for 2 weeks.  Tape-strips will most likely fall off on their own, however they may be removed after 2 weeks with rubbing alcohol if they have not.    please keep incision covered with clean dressings, daily checks and changes as needed.    Return to clinic if drainage continues past 3/02/2019.    Lower leg edema:  Left leg, compression on 23 hours per day.  PT may also treat for lymphedema    It is recommended that you delay any invasive procedures such as dental work, colonoscopy or other surgeries for 4 months after surgery unless it is an emergency.  Please notify the provider if an emergency occurs..    You may increase your activities as you can tolerate them.  Walking is a good activity.    No bending past 90 degrees at the hip joint, do not cross your legs, or perform excessive twisting at the hip for 4 months from surgery.    Please follow up in 2 weeks.          Donal Grover PA-C  [facility] - Surgery

## 2019-02-25 VITALS
HEART RATE: 68 BPM | OXYGEN SATURATION: 93 % | SYSTOLIC BLOOD PRESSURE: 122 MMHG | TEMPERATURE: 96.9 F | WEIGHT: 293 LBS | RESPIRATION RATE: 14 BRPM | DIASTOLIC BLOOD PRESSURE: 64 MMHG | BODY MASS INDEX: 48.83 KG/M2

## 2019-02-25 RX ORDER — METHOCARBAMOL 500 MG/1
500 TABLET, FILM COATED ORAL 4 TIMES DAILY PRN
COMMUNITY
End: 2019-08-30

## 2019-02-26 ENCOUNTER — TELEPHONE (OUTPATIENT)
Dept: ORTHOPEDICS | Facility: CLINIC | Age: 74
End: 2019-02-26

## 2019-02-26 ENCOUNTER — NURSING HOME VISIT (OUTPATIENT)
Dept: GERIATRICS | Facility: CLINIC | Age: 74
End: 2019-02-26
Payer: COMMERCIAL

## 2019-02-26 DIAGNOSIS — Z96.642 AFTERCARE FOLLOWING LEFT HIP JOINT REPLACEMENT SURGERY: ICD-10-CM

## 2019-02-26 DIAGNOSIS — K59.03 DRUG-INDUCED CONSTIPATION: ICD-10-CM

## 2019-02-26 DIAGNOSIS — I10 HTN, GOAL BELOW 140/90: ICD-10-CM

## 2019-02-26 DIAGNOSIS — D62 ANEMIA DUE TO BLOOD LOSS, ACUTE: ICD-10-CM

## 2019-02-26 DIAGNOSIS — M16.12 PRIMARY OSTEOARTHRITIS OF LEFT HIP: Primary | ICD-10-CM

## 2019-02-26 DIAGNOSIS — Z47.1 AFTERCARE FOLLOWING LEFT HIP JOINT REPLACEMENT SURGERY: ICD-10-CM

## 2019-02-26 DIAGNOSIS — R60.0 EDEMA OF BOTH LEGS: ICD-10-CM

## 2019-02-26 DIAGNOSIS — R53.81 PHYSICAL DECONDITIONING: ICD-10-CM

## 2019-02-26 PROCEDURE — 99309 SBSQ NF CARE MODERATE MDM 30: CPT | Performed by: NURSE PRACTITIONER

## 2019-02-26 NOTE — TELEPHONE ENCOUNTER
Received Orders from [facility] for Occupational Therapy, and Physical therapy. Order Certification from 2/16/19-4/16/19.      Please sign, and copy orders for patients record.     Addressed envelope was provided to return signed orders.     Keira Santiago ATC

## 2019-02-26 NOTE — LETTER
2/26/2019        RE: Cristy Bailey  [address]  [place] [address]        [facility]    Chief Complaint   Patient presents with     assisted Acute       [facility] Record Number:  [id number]  Place of Service where encounter took place:  [facility] [[id number]]    HPI:    Cristy Bailey is a 73 year old  (1945), who is being seen today for an episodic care visit.  HPI information obtained from: facility chart records, facility staff, patient report and [facility] chart review.    Today's concern is:  Primary osteoarthritis of left hip  Aftercare following left hip joint replacement surgery  Physical deconditioning  Patient continues therapies. Takes tylenol, oxycodone, robaxin and Neurontin.   Per therapies:  Performed gait training with FWW x 6', CGA with close w/c follow. Pt demonstrates decreased step length and  foot clearance with very slow deliberate steps     Anemia due to blood loss, acute  Hgb decreased last check.   Lab Results   Component Value Date    HGB 9.0 02/19/2019    HGB 9.8 02/15/2019        Drug-induced constipation  Reports regular movements.     HTN, goal below 140/90  Edema of both legs  Recent  BP Ranges: /47-79 mmHg and Admission Weight: 2/15/19: 293.4 lbs  Current Weights: 2/18/19: 295.9 lbs - 2/25/19: 302.4 lbs  Increased LE edema, wt up 9 lbs since admission.     ALLERGIES: No known drug allergies  Past Medical, Surgical, Family and Social History reviewed and updated in [facility].    Current Outpatient Medications   Medication Sig Dispense Refill     acetaminophen (TYLENOL) 325 MG tablet Take 3 tablets (975 mg) by mouth every 8 hours 100 tablet 2     citalopram (CELEXA) 20 MG tablet Take 20 mg by mouth daily       gabapentin (NEURONTIN) 300 MG capsule Take 1 tablet PO in the morning, 1 tablet PO midday, and 2 tablets PO at night. 180 capsule 1     methocarbamol (ROBAXIN) 500 MG tablet Take 500 mg by mouth 4 times  daily as needed for muscle spasms       oxyCODONE (ROXICODONE) 5 MG tablet Give 0.5 tablet by mouth every 4 hours as needed for moderate pain 1-5 out of ten pain AND Give 1 tablet by mouth every 4 hours as needed for severe pain 6-10 level of pain       polyethylene glycol (MIRALAX/GLYCOLAX) packet Take 1 packet by mouth daily as needed for constipation       senna-docusate (SENOKOT-S/PERICOLACE) 8.6-50 MG tablet Take 1 tablet by mouth 2 times daily as needed for constipation 60 tablet 0     Medications reviewed:  Medications reconciled to facility chart and changes were made to reflect current medications as identified as above med list. Below are the changes that were made:   Medications stopped since last EPIC medication reconciliation:   Medications Discontinued During This Encounter   Medication Reason     methocarbamol (ROBAXIN) 500 MG tablet Therapy completed     aspirin (ASA) 325 MG tablet Therapy completed       Medications started since last [facility] medication reconciliation:  Orders Placed This Encounter   Medications     methocarbamol (ROBAXIN) 500 MG tablet     Sig: Take 500 mg by mouth 4 times daily as needed for muscle spasms       REVIEW OF SYSTEMS:  10 point ROS of systems including Constitutional, Eyes, Respiratory, Cardiovascular, Gastroenterology, Genitourinary, Integumentary, Musculoskeletal, Psychiatric were all negative except for pertinent positives noted in my HPI.    Physical Exam:  /64   Pulse 68   Temp 96.9  F (36.1  C)   Resp 14   Wt 137.2 kg (302 lb 6.4 oz)   SpO2 93%   BMI 48.83 kg/m     GENERAL APPEARANCE:  Alert, in no distress, pleasant, cooperative, oriented x 4  EYES:  EOM, lids, pupils and irises normal, sclera clear and conjunctiva normal, no discharge or mattering on lids or lashes noted  ENT:  Mouth normal, moist mucous membranes, nose normal without drainage or crusting, external ears without lesions, hearing acuity intact  RESP:  respiratory effort normal, no chest  wall tenderness, no respiratory distress, Lung sounds clear, patient is on room air  CV:  Auscultation of heart done, rate and rhythm controlled, no murmur, no rub or gallop. Edema +2 pitting bilateral lower extremities.   ABDOMEN:  normal bowel sounds, soft, nontender, no palpable masses.  M/S:   Gait and station wheelchair bound and walks with assist , no tenderness or swelling of the joints; able to move all extremities, digits normal  SKIN:  Inspection and palpation of skin and subcutaneous tissue: exam of incision deferred   NEURO: cranial nerves 2-12 grossly intact, no facial asymmetry, no speech deficits and able to follow directions, moves all extremities symmetrically  PSYCH:  insight and judgement intact, memory intact, affect and mood normal     Recent Labs:   CBC RESULTS:   Recent Labs   Lab Test 02/19/19 02/15/19  0641  02/12/19 2217 08/30/18 0621  04/30/18  1246   WBC  --   --   --   --   --  8.9  --  12.4*   RBC  --   --   --   --   --  3.31*  --  4.82   HGB 9.0* 9.8*   < >  --    < > 9.6*   < > 14.3   HCT  --   --   --   --   --  32.2*  --  43.4   MCV  --   --   --   --   --  97  --  90   MCH  --   --   --   --   --  29.0  --  29.7   MCHC  --   --   --   --   --  29.8*  --  32.9   RDW  --   --   --   --   --  15.6*  --  14.1   PLT  --  214  --  195  --  425   < > 283    < > = values in this interval not displayed.       Last Basic Metabolic Panel:  Recent Labs   Lab Test 02/19/19 02/14/19  0614 02/12/19 2217 01/28/19  1112     --   --  140   POTASSIUM 3.9  --   --  4.2   CHLORIDE 105  --   --  107   ASHIA 7.9*  --   --  9.1   CO2 30  --   --  29   BUN 15  --   --  20   CR 0.63  --  0.71 0.67   GLC 95 116*  --  99     Assessment/Plan:  Primary osteoarthritis of left hip  Aftercare following left hip joint replacement surgery  Physical deconditioning  Acute on chronic; meds as above. Therapies, f/u with progress next week.     Anemia due to blood loss, acute  Acute, decreased Hgb last check.      Drug-induced constipation  Managed well, monitor.     HTN, goal below 140/90  Edema of both legs  Acute on chronic. Add lasix x 3 days and monitor.     Orders:  1. Lasix 20 mg PO daily x 3 days diagnosis edema  2. Check Hgb on 2/28 diagnosis anemia  3. Please get patient new pair of Tubi     Electronically signed by  ISRAEL Fung CNP                      Sincerely,        ISRAEL Fung CNP

## 2019-02-26 NOTE — PROGRESS NOTES
[facility]    Chief Complaint   Patient presents with     care home Acute       [facility] Record Number:  [id number]  Place of Service where encounter took place:  [facility] [[id number]]    HPI:    Cristy Bailey is a 73 year old  (1945), who is being seen today for an episodic care visit.  HPI information obtained from: facility chart records, facility staff, patient report and [facility] chart review.    Today's concern is:  Primary osteoarthritis of left hip  Aftercare following left hip joint replacement surgery  Physical deconditioning  Patient continues therapies. Takes tylenol, oxycodone, robaxin and Neurontin.   Per therapies:  Performed gait training with FWW x 6', CGA with close w/c follow. Pt demonstrates decreased step length and  foot clearance with very slow deliberate steps     Anemia due to blood loss, acute  Hgb decreased last check.   Lab Results   Component Value Date    HGB 9.0 02/19/2019    HGB 9.8 02/15/2019        Drug-induced constipation  Reports regular movements.     HTN, goal below 140/90  Edema of both legs  Recent  BP Ranges: /47-79 mmHg and Admission Weight: 2/15/19: 293.4 lbs  Current Weights: 2/18/19: 295.9 lbs - 2/25/19: 302.4 lbs  Increased LE edema, wt up 9 lbs since admission.     ALLERGIES: No known drug allergies  Past Medical, Surgical, Family and Social History reviewed and updated in [facility].    Current Outpatient Medications   Medication Sig Dispense Refill     acetaminophen (TYLENOL) 325 MG tablet Take 3 tablets (975 mg) by mouth every 8 hours 100 tablet 2     citalopram (CELEXA) 20 MG tablet Take 20 mg by mouth daily       gabapentin (NEURONTIN) 300 MG capsule Take 1 tablet PO in the morning, 1 tablet PO midday, and 2 tablets PO at night. 180 capsule 1     methocarbamol (ROBAXIN) 500 MG tablet Take 500 mg by mouth 4 times daily as needed for muscle spasms       oxyCODONE (ROXICODONE) 5 MG tablet Give 0.5 tablet by mouth  every 4 hours as needed for moderate pain 1-5 out of ten pain AND Give 1 tablet by mouth every 4 hours as needed for severe pain 6-10 level of pain       polyethylene glycol (MIRALAX/GLYCOLAX) packet Take 1 packet by mouth daily as needed for constipation       senna-docusate (SENOKOT-S/PERICOLACE) 8.6-50 MG tablet Take 1 tablet by mouth 2 times daily as needed for constipation 60 tablet 0     Medications reviewed:  Medications reconciled to facility chart and changes were made to reflect current medications as identified as above med list. Below are the changes that were made:   Medications stopped since last EPIC medication reconciliation:   Medications Discontinued During This Encounter   Medication Reason     methocarbamol (ROBAXIN) 500 MG tablet Therapy completed     aspirin (ASA) 325 MG tablet Therapy completed       Medications started since last [facility] medication reconciliation:  Orders Placed This Encounter   Medications     methocarbamol (ROBAXIN) 500 MG tablet     Sig: Take 500 mg by mouth 4 times daily as needed for muscle spasms       REVIEW OF SYSTEMS:  10 point ROS of systems including Constitutional, Eyes, Respiratory, Cardiovascular, Gastroenterology, Genitourinary, Integumentary, Musculoskeletal, Psychiatric were all negative except for pertinent positives noted in my HPI.    Physical Exam:  /64   Pulse 68   Temp 96.9  F (36.1  C)   Resp 14   Wt 137.2 kg (302 lb 6.4 oz)   SpO2 93%   BMI 48.83 kg/m    GENERAL APPEARANCE:  Alert, in no distress, pleasant, cooperative, oriented x 4  EYES:  EOM, lids, pupils and irises normal, sclera clear and conjunctiva normal, no discharge or mattering on lids or lashes noted  ENT:  Mouth normal, moist mucous membranes, nose normal without drainage or crusting, external ears without lesions, hearing acuity intact  RESP:  respiratory effort normal, no chest wall tenderness, no respiratory distress, Lung sounds clear, patient is on room air  CV:   Auscultation of heart done, rate and rhythm controlled, no murmur, no rub or gallop. Edema +2 pitting bilateral lower extremities.   ABDOMEN:  normal bowel sounds, soft, nontender, no palpable masses.  M/S:   Gait and station wheelchair bound and walks with assist , no tenderness or swelling of the joints; able to move all extremities, digits normal  SKIN:  Inspection and palpation of skin and subcutaneous tissue: exam of incision deferred   NEURO: cranial nerves 2-12 grossly intact, no facial asymmetry, no speech deficits and able to follow directions, moves all extremities symmetrically  PSYCH:  insight and judgement intact, memory intact, affect and mood normal     Recent Labs:   CBC RESULTS:   Recent Labs   Lab Test 02/19/19 02/15/19  0641  02/12/19 2217 08/30/18  0621  04/30/18  1246   WBC  --   --   --   --   --  8.9  --  12.4*   RBC  --   --   --   --   --  3.31*  --  4.82   HGB 9.0* 9.8*   < >  --    < > 9.6*   < > 14.3   HCT  --   --   --   --   --  32.2*  --  43.4   MCV  --   --   --   --   --  97  --  90   MCH  --   --   --   --   --  29.0  --  29.7   MCHC  --   --   --   --   --  29.8*  --  32.9   RDW  --   --   --   --   --  15.6*  --  14.1   PLT  --  214  --  195  --  425   < > 283    < > = values in this interval not displayed.       Last Basic Metabolic Panel:  Recent Labs   Lab Test 02/19/19 02/14/19  0614 02/12/19 2217 01/28/19  1112     --   --  140   POTASSIUM 3.9  --   --  4.2   CHLORIDE 105  --   --  107   ASHIA 7.9*  --   --  9.1   CO2 30  --   --  29   BUN 15  --   --  20   CR 0.63  --  0.71 0.67   GLC 95 116*  --  99     Assessment/Plan:  Primary osteoarthritis of left hip  Aftercare following left hip joint replacement surgery  Physical deconditioning  Acute on chronic; meds as above. Therapies, f/u with progress next week.     Anemia due to blood loss, acute  Acute, decreased Hgb last check.     Drug-induced constipation  Managed well, monitor.     HTN, goal below 140/90  Edema of  both legs  Acute on chronic. Add lasix x 3 days and monitor.     Orders:  1. Lasix 20 mg PO daily x 3 days diagnosis edema  2. Check Hgb on 2/28 diagnosis anemia  3. Please get patient new pair of Tubi     Electronically signed by  ISRAEL Fung CNP

## 2019-02-26 NOTE — TELEPHONE ENCOUNTER
Orders singed by David Grover PA-C.   Originals placed in outgoing mail, in provided envelopes.   Copies sent to scan.       Keira Santiago ATC

## 2019-03-01 ENCOUNTER — DISCHARGE SUMMARY NURSING HOME (OUTPATIENT)
Dept: GERIATRICS | Facility: CLINIC | Age: 74
End: 2019-03-01
Payer: COMMERCIAL

## 2019-03-01 VITALS
OXYGEN SATURATION: 95 % | DIASTOLIC BLOOD PRESSURE: 56 MMHG | WEIGHT: 293 LBS | SYSTOLIC BLOOD PRESSURE: 106 MMHG | RESPIRATION RATE: 16 BRPM | HEART RATE: 76 BPM | TEMPERATURE: 98.3 F | BODY MASS INDEX: 48.83 KG/M2

## 2019-03-01 DIAGNOSIS — I82.402 LEG DVT (DEEP VENOUS THROMBOEMBOLISM), ACUTE, LEFT (H): ICD-10-CM

## 2019-03-01 DIAGNOSIS — Z96.642 AFTERCARE FOLLOWING LEFT HIP JOINT REPLACEMENT SURGERY: ICD-10-CM

## 2019-03-01 DIAGNOSIS — R22.1 NECK MASS: ICD-10-CM

## 2019-03-01 DIAGNOSIS — I10 HTN, GOAL BELOW 140/90: ICD-10-CM

## 2019-03-01 DIAGNOSIS — K59.03 DRUG-INDUCED CONSTIPATION: ICD-10-CM

## 2019-03-01 DIAGNOSIS — D62 ANEMIA DUE TO BLOOD LOSS, ACUTE: ICD-10-CM

## 2019-03-01 DIAGNOSIS — Z47.1 AFTERCARE FOLLOWING LEFT HIP JOINT REPLACEMENT SURGERY: ICD-10-CM

## 2019-03-01 DIAGNOSIS — M16.12 PRIMARY OSTEOARTHRITIS OF LEFT HIP: Primary | ICD-10-CM

## 2019-03-01 LAB — INR PPP: 2.6 (ref 0.8–1.1)

## 2019-03-01 PROCEDURE — 99316 NF DSCHRG MGMT 30 MIN+: CPT | Performed by: NURSE PRACTITIONER

## 2019-03-01 RX ORDER — FUROSEMIDE 20 MG
20 TABLET ORAL DAILY
COMMUNITY
Start: 2019-02-27 | End: 2019-08-30

## 2019-03-01 RX ORDER — OXYCODONE HYDROCHLORIDE 5 MG/1
5 TABLET ORAL EVERY 6 HOURS PRN
Qty: 20 TABLET | Refills: 0 | Status: SHIPPED | OUTPATIENT
Start: 2019-03-01 | End: 2019-03-08

## 2019-03-01 NOTE — PROGRESS NOTES
[place] [facility] DISCHARGE SUMMARY    PATIENT'S NAME: Cristy Bailey  YOB: 1945  MEDICAL RECORD NUMBER:  [id number]  Place of Service where encounter took place:  [facility] [[id number]]    PRIMARY CARE PROVIDER AND CLINIC RESPONSIBLE AFTER TRANSFER: Manoj Daley [address] / [place] [address]     TRANSFERRING PROVIDERS: Tonya Lynn Haase, APRN CNP, Dr. Bib MD  DATE OF SNF ADMISSION:  February / 15 / 2019  DATE OF SNF (anticipated) DISCHARGE: March / 03 / 2019  DISCHARGE DISPOSITION: [facility] Provider   RECENT HOSPITALIZATION/ED:  [facility] Hospital stay 2/12/19 to 2/15/19.     CODE STATUS/ADVANCE DIRECTIVES DISCUSSION:   CPR/Full code      Allergies   Allergen Reactions     No Known Drug Allergies      Condition on Discharge:  Stable.  Function:  Walking up to 10 feet using a RW with CTG, WC for mobility and transfers are indep  Cognitive Scores: BIMS: 15/15, SBT: 2/28    Equipment: walker and WC    DISCHARGE DIAGNOSIS:   1. Primary osteoarthritis of left hip    2. Aftercare following left hip joint replacement surgery    3. Leg DVT (deep venous thromboembolism), acute, left (H)    4. Anemia due to blood loss, acute    5. Drug-induced constipation    6. HTN, goal below 140/90    7. Neck mass            PAST MEDICAL HISTORY:  has a past medical history of Arthritis, HTN (hypertension) (1/2010 ), Leg weakness (3/24/2015), and Lyme disease (1980'S AND 2004). She also has no past medical history of Chronic infection, Complication of anesthesia, History of blood transfusion, Malignant hyperthermia, Other chronic pain, or Sleep apnea.    DISCHARGE MEDICATIONS:  Current Outpatient Medications   Medication Sig Dispense Refill     acetaminophen (TYLENOL) 325 MG tablet Take 3 tablets (975 mg) by mouth every 8 hours 100 tablet 2     citalopram (CELEXA) 20 MG tablet Take 20 mg by mouth daily       furosemide (LASIX) 20 MG tablet Take 20 mg by mouth daily        gabapentin (NEURONTIN) 300 MG capsule Take 1 tablet PO in the morning, 1 tablet PO midday, and 2 tablets PO at night. 180 capsule 1     methocarbamol (ROBAXIN) 500 MG tablet Take 500 mg by mouth 4 times daily as needed for muscle spasms       oxyCODONE (ROXICODONE) 5 MG tablet Take 1 tablet (5 mg) by mouth every 6 hours as needed for pain 20 tablet 0     oxyCODONE (ROXICODONE) 5 MG tablet Give 0.5 tablet by mouth every 4 hours as needed for moderate pain 1-5 out of ten pain AND Give 1 tablet by mouth every 4 hours as needed for severe pain 6-10 level of pain       polyethylene glycol (MIRALAX/GLYCOLAX) packet Take 1 packet by mouth daily as needed for constipation       senna-docusate (SENOKOT-S/PERICOLACE) 8.6-50 MG tablet Take 1 tablet by mouth 2 times daily as needed for constipation 60 tablet 0       MEDICATION CHANGES/RATIONALE:   Lasix 20mg QD for 3 days on 2/26/19 2/20/19 started on lovenox 130mg SQ q 12 hours and coumadin for DVT left pesterior tibial vein, patient is currently stable with INR at goal range, lovenox DC she continues on coumadin 2.5mg QD  Encouraged to wean off oxycodone  Last 3 Bps: 106/56, 113/68, 111/64 mmHg  HR Ranges: 64-76 bpm  Admission Weight: 2/15/19: 293.4 lbs  Current Weights: 2/18/19: 295.9 lbs - 2/25/19: 302.4 lbs  Controlled medications sent with patient:   Script for oxycodone 5mg q 4 hours prn medication for 20 tabs and 0 refills given to patient at dischage to have them fill at their out patient pharmacy     ROS:    10 point ROS of systems including Constitutional, Eyes, Respiratory, Cardiovascular, Gastroenterology, Genitourinary, Integumentary, Musculoskeletal, Psychiatric were all negative except for pertinent positives noted in my HPI.    Physical Exam:   Vitals: /56   Pulse 76   Temp 98.3  F (36.8  C)   Resp 16   Wt 137.2 kg (302 lb 6.4 oz)   SpO2 95%   BMI 48.83 kg/m    BMI= Body mass index is 48.83 kg/m .  GENERAL APPEARANCE:  Alert, in no distress,  morbidly obese  ENT:  Mouth and posterior oropharynx normal, moist mucous membranes, normal hearing acuity  EYES:  EOM, conjunctivae, lids, pupils and irises normal, PERRL  RESP:  respiratory effort and palpation of chest normal, lungs clear to auscultation , no respiratory distress  CV:  Palpation and auscultation of heart done , regular rate and rhythm, no murmur, rub, or gallop, peripheral edema trace 1+ in LLE and trace RLE  ABDOMEN:  normal bowel sounds, soft, nontender, no hepatosplenomegaly or other masses  M/S:   Examination of:   right upper extremity, left upper extremity and right lower extremity  Inspection, ROM, stability and muscle strength normal and foot drop LLE, wearing orthotic shoe at time of exam  SKIN:  Inspection of skin and subcutaneous tissue baseline, did not visualize left hip incision  NEURO:   Cranial nerves 2-12 are normal tested and grossly at patient's baseline, speech WNL  PSYCH:  affect and mood normal    HPI Nursing Facility Course: Patient progressed to walking up to 10 feet using a RW and CTG assist which is improved from baseline, patient uses a WC at home, has  who helps care for her. Patient will DC home with home PT, OT , RN and HHA through [facility].   HPI information obtained from: facility chart records, facility staff, patient report and [facility] chart review.      Primary osteoarthritis of left hip  Aftercare following left hip joint replacement surgery  Acute/ongoing: DC home with home PT, OT, RN and HHA through [facility], continue tylenol 975mg q 8 hours, robaxin 500mg QID prn, oxycodone 5mg q 4 hours prn, encouraged to wean off, ASA 325mg QD     Acute DVT Left posterior tibial vein  Acute: INR at goal range 2.6 today, DC lovenox and continue coumadin 2.5mg PO QD, next INR on 3/5/19     Anemia due to blood loss, acute  Acute/ongoing: f/u with PCP as OP     Drug-induced constipation  Acute: continue senna s 1 PO BID, add miralax 17gm QD prn     HTN, goal below  140/90  Ongoing:  follow off Rx     Neck mass  Acute: US done in hospital, recommend OP f/u with MRI, will defer to PCP    DISCHARGE PLAN:  Occupational Therapy, Physical Therapy, Registered Nurse and Home Health Aide  Patient instructed to follow-up with:  PCP in 7 days      Current [place] scheduled appointments:  Future Appointments   Date Time Provider Department Center   3/7/2019 11:00 AM Manoj Daley PA-C [facility]    3/8/2019 12:20 PM Donal Grover PA-C [facility]       MTM referral needed and placed by this provider: No    Pending labs: none  SNF labs   Hemoglobin, Blood (02/28/2019 7:13 AM CST)  Hemoglobin, Blood (02/28/2019 7:13 AM CST)   Component Value Ref Range Performed At Pathologist Signature   Hemoglobin 9.3 (L) 11.8 - 15.5 g/dL PN SOFT       Basic Metabolic Panel (02/19/2019 6:41 AM CST)  Basic Metabolic Panel (02/19/2019 6:41 AM CST)   Component Value Ref Range Performed At Pathologist Signature   Creatinine Serum 0.63 0.55 - 1.02 mg/dL PN SOFT     Lab Glucose 95  Comment:   The stated glucose range is for the fasting state.  Non-fasting glucose range is  mg/dL   70 - 100 mg/dL PN SOFT     CO2 30 22 - 31 mmol/L PN SOFT     Chloride 105 98 - 109 mmol/L PN SOFT     Potassium 3.9 3.5 - 5.2 mmol/L PN SOFT     Sodium 141 136 - 145 mmol/L PN SOFT     Blood Urea Nitrogen 15 9 - 26 mg/dL PN SOFT     Calcium 7.9 (L) 8.4 - 10.4 mg/dL PN SOFT     Est GFR African Am >60 >60 mL/min/1.73m2 PN SOFT     Est GFR Non-Afr Am >60  Comment:   Normal>60, moderate decrease 30 - 59, severe decrease 15 - 29,  renal failure <15 mL/min/1.73 m2  NOTE:  Choose the eGFR result above appropriate for the race of the patient.   >60 mL/         CBC RESULTS:   Recent Labs   Lab Test 02/19/19 02/15/19  0641  02/12/19  2217  08/30/18  0621  04/30/18  1246   WBC  --   --   --   --   --  8.9  --  12.4*   RBC  --   --   --   --   --  3.31*  --  4.82   HGB 9.0* 9.8*   < >  --    < > 9.6*   < >  14.3   HCT  --   --   --   --   --  32.2*  --  43.4   MCV  --   --   --   --   --  97  --  90   MCH  --   --   --   --   --  29.0  --  29.7   MCHC  --   --   --   --   --  29.8*  --  32.9   RDW  --   --   --   --   --  15.6*  --  14.1   PLT  --  214  --  195  --  425   < > 283    < > = values in this interval not displayed.       Last Basic Metabolic Panel:  Recent Labs   Lab Test 02/19/19 02/14/19  0614 02/12/19  2217 01/28/19  1112     --   --  140   POTASSIUM 3.9  --   --  4.2   CHLORIDE 105  --   --  107   ASHIA 7.9*  --   --  9.1   CO2 30  --   --  29   BUN 15  --   --  20   CR 0.63  --  0.71 0.67   GLC 95 116*  --  99             Discharge Treatments:none    TOTAL DISCHARGE TIME:   Greater than 30 minutes  Electronically signed by:  Tonya Lynn Haase, APRN CNP

## 2019-03-01 NOTE — LETTER
3/1/2019        RE: Cristy Bailey  [address]  [place] [address]          [place] [facility] DISCHARGE SUMMARY    PATIENT'S NAME: Cristy Bailey  YOB: 1945  MEDICAL RECORD NUMBER:  [id number]  Place of Service where encounter took place:  [facility] ([facility] [[id number]]    PRIMARY CARE PROVIDER AND CLINIC RESPONSIBLE AFTER TRANSFER: Manoj Daley [address] / [place] [address]     TRANSFERRING PROVIDERS: Tonya Lynn Haase, APRN CNP, Dr. Bib MD  DATE OF SNF ADMISSION:  February / 15 / 2019  DATE OF SNF (anticipated) DISCHARGE: March / 03 / 2019  DISCHARGE DISPOSITION: [facility] Provider   RECENT HOSPITALIZATION/ED:  [facility] Hospital stay 2/12/19 to 2/15/19.     CODE STATUS/ADVANCE DIRECTIVES DISCUSSION:   CPR/Full code      Allergies   Allergen Reactions     No Known Drug Allergies      Condition on Discharge:  Stable.  Function:  Walking up to 10 feet using a RW with CTG, WC for mobility and transfers are indep  Cognitive Scores: BIMS: 15/15, SBT: 2/28    Equipment: walker and WC    DISCHARGE DIAGNOSIS:   1. Primary osteoarthritis of left hip    2. Aftercare following left hip joint replacement surgery    3. Leg DVT (deep venous thromboembolism), acute, left (H)    4. Anemia due to blood loss, acute    5. Drug-induced constipation    6. HTN, goal below 140/90    7. Neck mass            PAST MEDICAL HISTORY:  has a past medical history of Arthritis, HTN (hypertension) (1/2010 ), Leg weakness (3/24/2015), and Lyme disease (1980'S AND 2004). She also has no past medical history of Chronic infection, Complication of anesthesia, History of blood transfusion, Malignant hyperthermia, Other chronic pain, or Sleep apnea.    DISCHARGE MEDICATIONS:  Current Outpatient Medications   Medication Sig Dispense Refill     acetaminophen (TYLENOL) 325 MG tablet Take 3 tablets (975 mg) by mouth every 8 hours 100 tablet 2     citalopram (CELEXA) 20  MG tablet Take 20 mg by mouth daily       furosemide (LASIX) 20 MG tablet Take 20 mg by mouth daily       gabapentin (NEURONTIN) 300 MG capsule Take 1 tablet PO in the morning, 1 tablet PO midday, and 2 tablets PO at night. 180 capsule 1     methocarbamol (ROBAXIN) 500 MG tablet Take 500 mg by mouth 4 times daily as needed for muscle spasms       oxyCODONE (ROXICODONE) 5 MG tablet Take 1 tablet (5 mg) by mouth every 6 hours as needed for pain 20 tablet 0     oxyCODONE (ROXICODONE) 5 MG tablet Give 0.5 tablet by mouth every 4 hours as needed for moderate pain 1-5 out of ten pain AND Give 1 tablet by mouth every 4 hours as needed for severe pain 6-10 level of pain       polyethylene glycol (MIRALAX/GLYCOLAX) packet Take 1 packet by mouth daily as needed for constipation       senna-docusate (SENOKOT-S/PERICOLACE) 8.6-50 MG tablet Take 1 tablet by mouth 2 times daily as needed for constipation 60 tablet 0       MEDICATION CHANGES/RATIONALE:   Lasix 20mg QD for 3 days on 2/26/19 2/20/19 started on lovenox 130mg SQ q 12 hours and coumadin for DVT left pesterior tibial vein, patient is currently stable with INR at goal range, lovenox DC she continues on coumadin 2.5mg QD  Encouraged to wean off oxycodone  Last 3 Bps: 106/56, 113/68, 111/64 mmHg  HR Ranges: 64-76 bpm  Admission Weight: 2/15/19: 293.4 lbs  Current Weights: 2/18/19: 295.9 lbs - 2/25/19: 302.4 lbs  Controlled medications sent with patient:   Script for oxycodone 5mg q 4 hours prn medication for 20 tabs and 0 refills given to patient at dischage to have them fill at their out patient pharmacy     ROS:    10 point ROS of systems including Constitutional, Eyes, Respiratory, Cardiovascular, Gastroenterology, Genitourinary, Integumentary, Musculoskeletal, Psychiatric were all negative except for pertinent positives noted in my HPI.    Physical Exam:   Vitals: /56   Pulse 76   Temp 98.3  F (36.8  C)   Resp 16   Wt 137.2 kg (302 lb 6.4 oz)   SpO2 95%    BMI 48.83 kg/m     BMI= Body mass index is 48.83 kg/m .  GENERAL APPEARANCE:  Alert, in no distress, morbidly obese  ENT:  Mouth and posterior oropharynx normal, moist mucous membranes, normal hearing acuity  EYES:  EOM, conjunctivae, lids, pupils and irises normal, PERRL  RESP:  respiratory effort and palpation of chest normal, lungs clear to auscultation , no respiratory distress  CV:  Palpation and auscultation of heart done , regular rate and rhythm, no murmur, rub, or gallop, peripheral edema trace 1+ in LLE and trace RLE  ABDOMEN:  normal bowel sounds, soft, nontender, no hepatosplenomegaly or other masses  M/S:   Examination of:   right upper extremity, left upper extremity and right lower extremity  Inspection, ROM, stability and muscle strength normal and foot drop LLE, wearing orthotic shoe at time of exam  SKIN:  Inspection of skin and subcutaneous tissue baseline, did not visualize left hip incision  NEURO:   Cranial nerves 2-12 are normal tested and grossly at patient's baseline, speech WNL  PSYCH:  affect and mood normal    HPI Nursing Facility Course: Patient progressed to walking up to 10 feet using a RW and CTG assist which is improved from baseline, patient uses a WC at home, has  who helps care for her. Patient will DC home with home PT, OT , RN and HHA through [facility].   HPI information obtained from: facility chart records, facility staff, patient report and [facility] chart review.      Primary osteoarthritis of left hip  Aftercare following left hip joint replacement surgery  Acute/ongoing: DC home with home PT, OT, RN and HHA through [facility], continue tylenol 975mg q 8 hours, robaxin 500mg QID prn, oxycodone 5mg q 4 hours prn, encouraged to wean off, ASA 325mg QD     Acute DVT Left posterior tibial vein  Acute: INR at goal range 2.6 today, DC lovenox and continue coumadin 2.5mg PO QD, next INR on 3/5/19     Anemia due to blood loss, acute  Acute/ongoing: f/u with PCP as  OP     Drug-induced constipation  Acute: continue senna s 1 PO BID, add miralax 17gm QD prn     HTN, goal below 140/90  Ongoing:  follow off Rx     Neck mass  Acute: US done in hospital, recommend OP f/u with MRI, will defer to PCP    DISCHARGE PLAN:  Occupational Therapy, Physical Therapy, Registered Nurse and Home Health Aide  Patient instructed to follow-up with:  PCP in 7 days      [facility] scheduled appointments:  Future Appointments   Date Time Provider Department Center   3/7/2019 11:00 AM Manoj Daley PA-C [facility]   3/8/2019 12:20 PM [facility]ver [facility]       MTM referral needed and placed by this provider: No    Pending labs: none  SNF labs   Hemoglobin, Blood (02/28/2019 7:13 AM CST)  Hemoglobin, Blood (02/28/2019 7:13 AM CST)   Component Value Ref Range Performed At Pathologist Signature   Hemoglobin 9.3 (L) 11.8 - 15.5 g/dL PN SOFT       Basic Metabolic Panel (02/19/2019 6:41 AM CST)  Basic Metabolic Panel (02/19/2019 6:41 AM CST)   Component Value Ref Range Performed At Pathologist Signature   Creatinine Serum 0.63 0.55 - 1.02 mg/dL PN SOFT     Lab Glucose 95  Comment:   The stated glucose range is for the fasting state.  Non-fasting glucose range is  mg/dL   70 - 100 mg/dL PN SOFT     CO2 30 22 - 31 mmol/L PN SOFT     Chloride 105 98 - 109 mmol/L PN SOFT     Potassium 3.9 3.5 - 5.2 mmol/L PN SOFT     Sodium 141 136 - 145 mmol/L PN SOFT     Blood Urea Nitrogen 15 9 - 26 mg/dL PN SOFT     Calcium 7.9 (L) 8.4 - 10.4 mg/dL PN SOFT     Est GFR African Am >60 >60 mL/min/1.73m2 PN SOFT     Est GFR Non-Afr Am >60  Comment:   Normal>60, moderate decrease 30 - 59, severe decrease 15 - 29,  renal failure <15 mL/min/1.73 m2  NOTE:  Choose the eGFR result above appropriate for the race of the patient.   >60 mL/         CBC RESULTS:   Recent Labs   Lab Test 02/19/19 02/15/19  0641  02/12/19  2217  08/30/18  0621  04/30/18  1246   WBC  --   --   --   --    --  8.9  --  12.4*   RBC  --   --   --   --   --  3.31*  --  4.82   HGB 9.0* 9.8*   < >  --    < > 9.6*   < > 14.3   HCT  --   --   --   --   --  32.2*  --  43.4   MCV  --   --   --   --   --  97  --  90   MCH  --   --   --   --   --  29.0  --  29.7   MCHC  --   --   --   --   --  29.8*  --  32.9   RDW  --   --   --   --   --  15.6*  --  14.1   PLT  --  214  --  195  --  425   < > 283    < > = values in this interval not displayed.       Last Basic Metabolic Panel:  Recent Labs   Lab Test 02/19/19 02/14/19  0614 02/12/19 2217 01/28/19  1112     --   --  140   POTASSIUM 3.9  --   --  4.2   CHLORIDE 105  --   --  107   ASHIA 7.9*  --   --  9.1   CO2 30  --   --  29   BUN 15  --   --  20   CR 0.63  --  0.71 0.67   GLC 95 116*  --  99             Discharge Treatments:none    TOTAL DISCHARGE TIME:   Greater than 30 minutes  Electronically signed by:  Tonya Lynn Haase, APRN CNP      Sincerely,        Tonya Lynn Haase, APRN CNP

## 2019-03-04 ENCOUNTER — TELEPHONE (OUTPATIENT)
Dept: FAMILY MEDICINE | Facility: CLINIC | Age: 74
End: 2019-03-04

## 2019-03-04 DIAGNOSIS — I82.402 ACUTE THROMBOEMBOLISM OF DEEP VEINS OF LEFT LOWER EXTREMITY (H): ICD-10-CM

## 2019-03-04 LAB — INR PPP: 3.8 (ref 0.8–1.1)

## 2019-03-04 NOTE — TELEPHONE ENCOUNTER
Mirian called and never heard back from the clinic. Since the INR is 3.8 they will have to hold to dosing. Please call [phone] and that is the main  home care line and ask for the [place] nurse clinic coordinator. They do need the dosing for tomorrow. Please them know when you want the next INR drawn?

## 2019-03-04 NOTE — TELEPHONE ENCOUNTER
Received a telephone message from SANDY Vela RN, ([phone]): States patient has INR due tomorrow and is wondering if they can switch the draw to today or Wednesday. If Wednesday would need dosing instructions until then.     Huddled with Kulwant: He needs to have a hospital follow up visit with the patient. Does not feel she is a good candidate for warfarin as she has a hard time coming in for regular clinic visits and to be managed on warfarin, there is the potential need to come in to the clinic frequently depending on INR results. For now, ok for [facility] RN to draw INR today and send results to PCP for dosing and next INR check. Triage to call patient to assist with scheduling hospital f/u visit.    Called Mirian back: No answer. Left detailed message to check patient's INR today and call PCP with results for dosing.    Will call patient for hospital f/u.    Fan Rivera RN

## 2019-03-04 NOTE — TELEPHONE ENCOUNTER
This is a poor handoff. We rec'd no information on this until this afternoon. Additionally, this patient needs to have an appointment for hospital f/u.    She should hold her coumadin tomorrow if she has taken her dose today and then have the INR checked again on Wednesday. She should not take her coumadin until INR is checked and dosing confirmed by the INR clinic. I am placing an INR referral as it appears this has not been done?    I will give a verbal to the home care orders as mentioned below in Keena's note

## 2019-03-04 NOTE — TELEPHONE ENCOUNTER
Please contact patient for In-patient follow up.  [phone] (home)     Visit date: 3/3/2019  Diagnosis listed:Primary Osteoarthritis of Left Hip  Number of visits in past 12 months:0/1

## 2019-03-04 NOTE — TELEPHONE ENCOUNTER
"Routing to PCP for further advice.    ED / Discharge Outreach Protocol    Patient Contact    Attempt # 1 Patient had appt scheduled 3/7/19 with Kulwant Daley but cancelled.    Was call answered?  Yes.  \"May I please speak with Cristy?\"  Is patient available?   Yes    Hospital/TCU/ED for chronic condition Discharge Protocol    \"Hi, my name is Keena GALARZAAntonio Yo, a registered nurse, and I am calling from [facility].  I am calling to follow up and see how things are going for you after your recent emergency visit/hospital/TCU stay.\"    Tell me how you are doing now that you are home?\" Doing ok but in a lot of pain - left leg, knee down to foot, no different than when she was in rehab facility. She was teary and wanted this writer to talk to home care nurse. Nurse, Mirian (ph: [phone]) states they did not give the patient a long-term plan on managing her anticoagulation - feels it would be difficult for patient to come into clinic as well once she is off homecare services   INR today is 3.8. Is currently taking 2.5 mg warfarin daily and only has 1 tablet remaining (will need new prescription).  Nurse scheduling her oxycodone to help relieve pain - 1 oxy q 6 hrs, alternating with Tyl q 6 hrs. Feels her leg pain is d/t the DVT more so than the hip surgery. Now that scheduling oxy - will need prescription soon. Will follow up with surgeon until able to see PCP.    [facility] RN in need of orders:  Nursing twice a week x 4 weeks  PT/OT 1 day to eval and treat  Patient declined HHA and SW.    Discharge Instructions    \"Let's review your discharge instructions.  What is/are the follow-up recommendations?  Pt. Response: f/u with surgeon and PCP, check INR, take medications as prescribed.     \"Has an appointment with your primary care provider been scheduled?\"   No (schedule appointment) Patient states she has a f/u visit with Dr. Grover and she has such a hard time getting in and out of the car to clinic visits, that she " "cancelled her appt with PCP. [facility] RN will work with patient to get her scheduled with PCP once patient has calmed down before leaving patient's home.    \"When you see the provider, I would recommend that you bring your medications with you.\"    Medications    \"Tell me what changed about your medicines when you discharged?\"    Changes to chronic meds?    2 or more - Epic MTM referral needed    \"What questions do you have about your medications?\"   Since homecare nurse is scheduling oxy to get ahead of patient's leg pain, she is worried patient is going to run out. Advised they should follow up with surgeon until able to see Kulwant. Nurse stated understanding and will advise patient.     New diagnoses of heart failure, COPD, diabetes, or MI?    No      On warfarin: \"Were you given any recommendations for follow-up with the anticoagulation clinic?\" No - (Can recommendation be found in discharge instructions/discharge summary?)  No -  New start on warfarin - schedule for next available Anticoagulation clinic day    Medication reconciliation completed? Yes  Was MTM referral placed (*Make sure to put transitions as reason for referral)?   No    Call Summary    \"What questions or concerns do you have about your recent visit and your follow-up care?\"     none    \"If you have questions or things don't continue to improve, we encourage you contact us through the main clinic number (give number).  Even if the clinic is not open, triage nurses are available 24/7 to help you.     We would like you to know that our clinic has extended hours (provide information).  We also have urgent care (provide details on closest location and hours/contact info)\"      \"Thank you for your time and take care!\"    Keena CHAPMAN Triage RN             "

## 2019-03-05 ENCOUNTER — PATIENT OUTREACH (OUTPATIENT)
Dept: CARE COORDINATION | Facility: CLINIC | Age: 74
End: 2019-03-05

## 2019-03-05 ASSESSMENT — ACTIVITIES OF DAILY LIVING (ADL)
DEPENDENT_IADLS:: COOKING;CLEANING;LAUNDRY;SHOPPING;MEAL PREPARATION;TRANSPORTATION
DEPENDENT_IADLS:: COOKING;CLEANING;LAUNDRY;SHOPPING;MEAL PREPARATION;TRANSPORTATION

## 2019-03-05 NOTE — PATIENT INSTRUCTIONS
Hu from [facility] on 3/3/19 to home with home care     Received notification 3/5/19

## 2019-03-05 NOTE — TELEPHONE ENCOUNTER
The home care nurse if fine to draw the levels to make this easier and from then on should be ok to be managed via our INR clinic.

## 2019-03-05 NOTE — TELEPHONE ENCOUNTER
Left a message for Mirian of  Home Care earlier, await return phone call.  Please try back later today if she does not call back.

## 2019-03-05 NOTE — PROGRESS NOTES
Clinic Care Coordination Contact  Care Team Conversations  Patient has been discharged from [facility] to home with [facility] ordered.      Did a warm transfer to clinic care coordinator Shazia Billings who will do outreach to patient. Writer is transitioning to a new position.     Plan- no further outreach by this CC.  Francisca Hernandez,   [facility]  [email or web address]  [phone]

## 2019-03-05 NOTE — PROGRESS NOTES
Clinic Care Coordination Contact  OUTREACH    Referral Information:  Referral Source: Care Team    Primary Diagnosis: Orthopedic    Chief Complaint   Patient presents with     Clinic Care Coordination - Post Hospital     Dc'd from [facility] Home care on 3/3/19 to home with home care         Universal Utilization: Patient was hospitalized at [facility] from 02/12 - 02/15  And discharged to [facility] until 03/03 when she was then discharged home with home care. DX: osteoarthritis of left hip.   Clinic Utilization  Difficulty keeping appointments:: No  Compliance Concerns: No  No-Show Concerns: No  No PCP office visit in Past Year: No  Utilization    Last refreshed: 3/5/2019 11:18 AM:  Hospital Admissions 3           Last refreshed: 3/5/2019 11:18 AM:  ED Visits 1           Last refreshed: 3/5/2019 11:18 AM:  No Show Count (past year) 0              Current as of: 3/5/2019 11:18 AM            Clinical Concerns:  Current Medical Concerns:  Patient reports she is feeling about the same. Continuing to have constant pain in her left leg since surgery. Reports sleeping helps. Patient cannot really tell the difference in pain when she takes the medication or not. Patient will follow up with orthopedics on Friday and then determine whether or not she will follow up with Primary Care Provider. Reports difficulty getting in and out of the car and will consider scheduling a telephone visit.   Education Provided to patient: CC Role  Pain  Pain (GOAL):: Yes  Type: Chronic (>3mo)  Location of chronic pain:: L Leg  Progression: Constant  Limitation of routine activities due to chronic pain:: Yes  Description: Unable to perform most daily activities (chores, hobbies, social activities, driving)  Alleviating Factors: Other(Sleeping)  Health Maintenance Reviewed: Not assessed  Clinical Pathway: None    Medication Management:  Patient independently manages her own medications which were reviewed with patient.      Functional Status:  Dependent ADLs:: Wheelchair-independent, Ambulation-walker  Dependent IADLs:: Cooking, Cleaning, Laundry, Shopping, Meal Preparation, Transportation  Bed or wheelchair confined:: No  Mobility Status: Independent w/Device  Fallen 2 or more times in the past year?: No  Any fall with injury in the past year?: No    Living Situation:  Current living arrangement:: I live in a private home with spouse  Type of residence:: Private home - no stairs    Diet/Exercise/Sleep:  Diet:: Regular  Inadequate nutrition (GOAL):: No  Food Insecurity: No  Tube Feeding: No  Exercise:: Unable to exercise  Inadequate activity/exercise (GOAL):: No  Significant changes in sleep pattern (GOAL): No    Transportation:  Transportation concerns (GOAL):: No  Transportation means:: Regular car, Family     Psychosocial:  Mental health DX:: Yes  Mental health DX how managed:: Medication  Mental health management concern (GOAL):: No  Informal Support system:: Family, Spouse     Financial/Insurance:   Financial/Insurance concerns (GOAL):: No     Resources and Interventions:  Current Resources:   List of home care services:: Skilled Nursing, Home Health Aid, Physicial Therapy, Occupational Therapy([facility]);   Community Resources: Home [facility])     Equipment Currently Used at Home: walker, standard, wheelchair, manual, grab bar, toilet, grab bar, tub/shower, walker, rolling, shower chair, raised toilet    Advance Care Plan/Directive  Advanced Care Plans/Directives on file:: No  Advanced Care Plan/Directive Status: Declined Further Information    Referrals Placed: None    Patient/Caregiver understanding: Patient verbalized understanding and denies any additional questions or concerns at this time. RNCC engaged in AIDET communications during encounter.        Future Appointments              In 3 days Donal Grover PACarolinaC [facility] ORTHOPEDIC SURGERY, [facility]          Plan: At this time, patient denies  outstanding need for connection or referral to resources or assistance navigating recommended follow up care. No further Care Coordination outreaches scheduled at this time, patient provided with this writer's number and encouraged to call with future needs or questions.     Shazia Polanco RN Care Coordinator  [facility]  Email: [email or web address]  Phone: [phone]

## 2019-03-05 NOTE — TELEPHONE ENCOUNTER
See other encounter - the hospital f/u of yesterday.      Called the number below and spoke to Arianna.      She said Mirian is not working today - one of the other nurses will call back.

## 2019-03-05 NOTE — TELEPHONE ENCOUNTER
HANNA Daley -     Called and left a message for Mirian - [facility] to call us back - see below when she calls back.      Called the pt.  She wants to see the hip doctor first before scheduling here.  She is seeing him Friday.  She is crying.  She would like to call back.  She says she is in too much pain.      She took coumadin yesterday.  Advised of below and advised of coming in to INR clinic tomorrow per notes.  She is crying and she says she can't come in here - will see if [facility] can draw it when they call back.      She says she can't hardly move.

## 2019-03-05 NOTE — TELEPHONE ENCOUNTER
HANNA Daley and RUDY Brink.      Spoke to Uma of  Home Care [phone].  She will call the pt to hold coumadin today and she will set up to have INR drawn by home care tomorrow (Wednesday) and they will call in the INR to the INR clinic.    Gave Uma verbal ok for home care orders per below per Kulwant Daley.

## 2019-03-06 ENCOUNTER — ANTICOAGULATION THERAPY VISIT (OUTPATIENT)
Dept: NURSING | Facility: CLINIC | Age: 74
End: 2019-03-06
Payer: COMMERCIAL

## 2019-03-06 ENCOUNTER — TELEPHONE (OUTPATIENT)
Dept: ORTHOPEDICS | Facility: CLINIC | Age: 74
End: 2019-03-06

## 2019-03-06 DIAGNOSIS — I82.402 ACUTE THROMBOEMBOLISM OF DEEP VEINS OF LEFT LOWER EXTREMITY (H): ICD-10-CM

## 2019-03-06 DIAGNOSIS — Z79.01 LONG TERM CURRENT USE OF ANTICOAGULANT THERAPY: Primary | ICD-10-CM

## 2019-03-06 LAB — INR PPP: 2.7 (ref 0.8–1.1)

## 2019-03-06 RX ORDER — WARFARIN SODIUM 2.5 MG/1
TABLET ORAL
Qty: 30 TABLET | Refills: 0 | Status: SHIPPED | OUTPATIENT
Start: 2019-03-06 | End: 2019-03-29

## 2019-03-06 NOTE — TELEPHONE ENCOUNTER
Reason for call:  Loretta the home health PT called to update on Cristy and get orders. Stated that she went today to work with her and she is really struggling. While at home she has only taken one step and it was with a transfer. At [facility] she at most took 8 steps and it was very infrequent. Her pain is 7/10 consistently. While working with her she cries out and cries a lot and almost works herself into a panic attack when attempting to walk on her hip.     Asking for orders for therapy 3x/week for 2 weeks, then 2x/week for 1 week, and then 1x week/ 1 week to get her more confident and walking. Did some sitting and supine exercises today.    Would like a call back to discuss and get an ok for the orders.      Phone number to reach patient:  Other phone number:  [phone]      Can we leave a detailed message on this number?  Unsure    Kasey Ireland ATC

## 2019-03-07 NOTE — TELEPHONE ENCOUNTER
Ok for home PT, will assess Cristy at appointment 3/9/19.    Donal Grover PA-C  [facility] - Surgery

## 2019-03-07 NOTE — TELEPHONE ENCOUNTER
Phone call to Loretta, physical therapist, home care. Verbal order for physical therapy as requested. Informed David Grover PA-C will evaluate her at her appointment tomorrow.   She asks that any written updates be sent with her or she will watch for them in Epic.     DIEGO Javed RN

## 2019-03-07 NOTE — PROGRESS NOTES
ANTICOAGULATION FOLLOW-UP CLINIC VISIT    Patient Name:  Cristy Bailey  Date:  3/7/2019  Contact Type:  Telephone/ [phone] [facility]    SUBJECTIVE:     Patient Findings     Comments:   Kaylynn RN from [facility] called with patient's INR at 2.7 today. States patient held her warfarin on  and . Patient continues to be in a lot of pain. Questions whether this patient will do well on warfarin if not being drawn at home. Would like verbal order for social work visit. Verbal okay provided by this writer. Patient only has one 2.5 mg tablet left. Prescription sent into pharmacy (needs PCP cosign). Informed RN of dosing and to check next INR in two days. This writer and [facility] RN educated patient on importance of keeping green veggie diet stable. Patient states she has green veggies every day (asparagus or mixed veggies).     Keena CHAPMAN RN  Anticoagulation Clinic  [address]             OBJECTIVE    INR   Date Value Ref Range Status   2019 2.7 (A) 0.8 - 1.1 Final       ASSESSMENT / PLAN  INR assessment THER    Recheck INR In: 2 DAYS    INR Location Homecare INR      Anticoagulation Summary  As of 3/6/2019    INR goal:   2.0-3.0   TTR:   --   INR used for dosin.7 (3/6/2019)   Warfarin maintenance plan:   2.5 mg (2.5 mg x 1) every day   Full warfarin instructions:   2.5 mg every day   Weekly warfarin total:   17.5 mg   Plan last modified:   Keena Yo RN (3/6/2019)   Next INR check:   3/8/2019   Target end date:   2019    Indications    Acute thromboembolism of deep veins of left lower extremity (H) [I82.402]             Anticoagulation Episode Summary     INR check location:   Clinic Lab    Preferred lab:       Send INR reminders to:    [facility]    Comments:         Anticoagulation Care Providers     Provider Role Specialty Phone number    Manoj Daley PA-C Referring Physician Assistant [phone]            See the Encounter Report to view Anticoagulation Flowsheet and Dosing  Calendar (Go to Encounters tab in chart review, and find the Anticoagulation Therapy Visit)    Dosage adjustment made based on physician directed care plan.    Keena oY RN

## 2019-03-07 NOTE — TELEPHONE ENCOUNTER
Patient's INR results and warfarin dosing now being followed in INR clinic.     [facility] nurse also requested SW consult - as they feel patient will need help after homecare is no longer able to check the INRs at home. Provided verbal ok.    Routing to PCP as CHADWICK CHAPMAN RN  Anticoagulation Clinic  [place]

## 2019-03-08 ENCOUNTER — OFFICE VISIT (OUTPATIENT)
Dept: ORTHOPEDICS | Facility: CLINIC | Age: 74
End: 2019-03-08
Payer: COMMERCIAL

## 2019-03-08 ENCOUNTER — ANTICOAGULATION THERAPY VISIT (OUTPATIENT)
Dept: NURSING | Facility: CLINIC | Age: 74
End: 2019-03-08

## 2019-03-08 DIAGNOSIS — Z96.642 AFTERCARE FOLLOWING LEFT HIP JOINT REPLACEMENT SURGERY: ICD-10-CM

## 2019-03-08 DIAGNOSIS — Z47.1 AFTERCARE FOLLOWING LEFT HIP JOINT REPLACEMENT SURGERY: ICD-10-CM

## 2019-03-08 DIAGNOSIS — I82.402 ACUTE THROMBOEMBOLISM OF DEEP VEINS OF LEFT LOWER EXTREMITY (H): ICD-10-CM

## 2019-03-08 DIAGNOSIS — I82.402 DEEP VEIN THROMBOSIS (DVT) OF LEFT LOWER EXTREMITY, UNSPECIFIED CHRONICITY, UNSPECIFIED VEIN (H): Primary | ICD-10-CM

## 2019-03-08 DIAGNOSIS — M16.12 PRIMARY OSTEOARTHRITIS OF LEFT HIP: ICD-10-CM

## 2019-03-08 LAB — INR PPP: 1.4 (ref 0.8–1.1)

## 2019-03-08 PROCEDURE — 99024 POSTOP FOLLOW-UP VISIT: CPT | Performed by: PHYSICIAN ASSISTANT

## 2019-03-08 RX ORDER — OXYCODONE HYDROCHLORIDE 5 MG/1
5 TABLET ORAL EVERY 6 HOURS PRN
Qty: 40 TABLET | Refills: 0 | Status: SHIPPED | OUTPATIENT
Start: 2019-03-08 | End: 2019-03-20

## 2019-03-08 NOTE — PATIENT INSTRUCTIONS
Keep incision clean and covered, change dressing every other day minimum.    Continue to work on walking, standing.    Compression for swelling of the leg.    Follow up in 2 weeks.

## 2019-03-08 NOTE — PROGRESS NOTES
"ANTICOAGULATION FOLLOW-UP CLINIC VISIT    Patient Name:  Cristy Bailey  Date:  3/8/2019  Contact Type:  Telephone/ see below    SUBJECTIVE:     Patient Findings     Comments:   [facility] RN, Kaylynn called ([phone]): She clearly stated to the patient at last home visit to take 2.5 mg on Wednesday and Thursday and she would recheck INR today. Patient's INR is 1.4. Patient states \"someone called and told me to not take the warfarin after the homecare nurse was there on Wednesday\". So patient held her warfarin. Also states the prescription was not ready until today (though patient did/does have one 2.5 mg warfarin available at home. Her  will pick prescription up today. Homecare nurse wonders if patient is confused about when she was told to hold her warfarin at the beginning of the week. Homecare nurse plans to bring out med-minder to help patient keep track of medications at next visit on Monday at which time she will check INR again as well.  Patient's dosing going forward will be to increase warfarin per protocol (10-20%) - increasing dose to 5 mg today, then continue with 2.5 mg on Sat and Sun. Called INR nurse back with dosing instructions. Nurse stated understanding and will talk with the patient.    Keena CHAPMAN RN  Anticoagulation Clinic  [place]             OBJECTIVE    INR   Date Value Ref Range Status   2019 1.4 (A) 0.8 - 1.1 Final       ASSESSMENT / PLAN  INR assessment SUB    Recheck INR In: 3 DAYS    INR Location [facility] INR      Anticoagulation Summary  As of 3/8/2019    INR goal:   2.0-3.0   TTR:   --   INR used for dosin.4! (3/8/2019)   Warfarin maintenance plan:   2.5 mg (2.5 mg x 1) every day   Full warfarin instructions:   3/8: 5 mg; Otherwise 2.5 mg every day   Weekly warfarin total:   17.5 mg   Plan last modified:   Keena Yo RN (3/6/2019)   Next INR check:   3/11/2019   Target end date:   2019    Indications    Acute thromboembolism of deep veins of left lower " extremity (H) [I82.402]             Anticoagulation Episode Summary     INR check location:   Clinic Lab    Preferred lab:       Send INR reminders to:   [facility]    Comments:         Anticoagulation Care Providers     Provider Role Specialty Phone number    Manoj Daley PA-C Referring Physician Assistant [phone]            See the Encounter Report to view Anticoagulation Flowsheet and Dosing Calendar (Go to Encounters tab in chart review, and find the Anticoagulation Therapy Visit)    Dosage adjustment made based on physician directed care plan.    Keena Yo RN

## 2019-03-08 NOTE — PROGRESS NOTES
HISTORY OF PRESENT ILLNESS:    Cristy Bailey is a 73 year old female who is seen in follow up for left JENAE, DOS 2/12/19, Dr. Chavo Rodriguez.  Present symptoms: pt report no pain in hip.  Pain unbearable at times in left lower leg, worse at night.  Tapering Oxycodone but struggling.  Transfers and stands only.  Does not like the stress of coumadin anticoagulation, has frequent changes in meds with daily calls and nurses conflicting dosing instructions.  Foot weak but can move now.  Does not own shoes.   AFO brace difficult to use in post op shoe.  No new injuries.  Denies Chest pain, Calve pain, Fever, Chills.    Current Treatment: post op    PHYSICAL EXAM:  There were no vitals taken for this visit.  There is no height or weight on file to calculate BMI.   GENERAL APPEARANCE: healthy, alert and no distress   PSYCH:  mentation appears normal and affect normal/bright    MSK:  Left: Hip .  Ambulates: present sin wc..  Incision clean, serous/proteinaceous exudate on dressing present, healing.  NO incisional erythema.   No Ecchymosis.  No calve pain on palpation.  Edema Moderate at hip and lower leg.  CMS: joesph incisional numbness, otherwise grossly intact.  Ankle Dorsiflexion 4-/5 with d flexion 0.      IMAGING INTERPRETATION:  None today.     ASSESSMENT:  Cristy Bailey is a 73 year old female S/P left JENAE, DOS 2/12/19, Dr. Chavo Rodriguez..    Foot drop improving.  Leg pain possible sciatic related.    PLAN:  - Care instructions given and verbally acknowledged.  - Medications: refill oxycodone.  - will research switching to novel vs coumadin anticoag.  - Home therapy.  - purchase shoes.    Return to clinic 2, weeks.    Donal Grover PA-C    Dept. Orthopedic Surgery  [facility]   3/8/2019

## 2019-03-11 ENCOUNTER — TELEPHONE (OUTPATIENT)
Dept: FAMILY MEDICINE | Facility: CLINIC | Age: 74
End: 2019-03-11

## 2019-03-11 ENCOUNTER — ANTICOAGULATION THERAPY VISIT (OUTPATIENT)
Dept: FAMILY MEDICINE | Facility: CLINIC | Age: 74
End: 2019-03-11
Payer: COMMERCIAL

## 2019-03-11 DIAGNOSIS — I82.402 ACUTE THROMBOEMBOLISM OF DEEP VEINS OF LEFT LOWER EXTREMITY (H): ICD-10-CM

## 2019-03-11 LAB — INR PPP: 1.4 (ref 0.9–1.1)

## 2019-03-11 PROCEDURE — 99207 ZZC NO CHARGE NURSE ONLY: CPT | Performed by: PHYSICIAN ASSISTANT

## 2019-03-11 NOTE — PROGRESS NOTES
ANTICOAGULATION FOLLOW-UP CLINIC VISIT    Patient Name:  Cristy Bailey  Date:  3/11/2019  Contact Type:  Telephone/ with home care nurse    SUBJECTIVE:     Patient Findings     Positives:   Missed doses    Comments:   Patient did not take increased dose on Friday.             OBJECTIVE    INR   Date Value Ref Range Status   2019 1.4 (A) 0.9 - 1.1 Final       ASSESSMENT / PLAN  INR assessment SUB    Recheck INR In: 4 DAYS    INR Location [facility] INR      Anticoagulation Summary  As of 3/11/2019    INR goal:   2.0-3.0   TTR:   --   INR used for dosin.4! (3/11/2019)   Warfarin maintenance plan:   2.5 mg (2.5 mg x 1) every day   Full warfarin instructions:   3/11: 5 mg; Otherwise 2.5 mg every day   Weekly warfarin total:   17.5 mg   Plan last modified:   Keena Yo RN (3/6/2019)   Next INR check:   3/15/2019   Target end date:   2019    Indications    Acute thromboembolism of deep veins of left lower extremity (H) [I82.402]             Anticoagulation Episode Summary     INR check location:   Clinic Lab    Preferred lab:       Send INR reminders to:    [facility]    Comments:         Anticoagulation Care Providers     Provider Role Specialty Phone number    Manoj Daley PA-C Referring Physician Assistant [phone]            See the Encounter Report to view Anticoagulation Flowsheet and Dosing Calendar (Go to Encounters tab in chart review, and find the Anticoagulation Therapy Visit)    Patient INR is sub therapeutic today.  Patient will increase dose today to 5 mg which is a 14% increase this week..  Will then continue weekly maintenance dose of 2.5 mg and follow up in 4 days or sooner if there are any concerns or problems.     Discussed signs of clotting with patient and when to seek care for concerns.  Patient verbalized understanding    Rationale to adjustments:  Dosage adjustment made based on physician directed care plan.        Keena Bañuelos RN

## 2019-03-15 ENCOUNTER — ANTICOAGULATION THERAPY VISIT (OUTPATIENT)
Dept: NURSING | Facility: CLINIC | Age: 74
End: 2019-03-15
Payer: COMMERCIAL

## 2019-03-15 DIAGNOSIS — I82.402 ACUTE THROMBOEMBOLISM OF DEEP VEINS OF LEFT LOWER EXTREMITY (H): ICD-10-CM

## 2019-03-15 LAB — INR PPP: 2.1 (ref 0.8–1.1)

## 2019-03-15 NOTE — PROGRESS NOTES
ANTICOAGULATION FOLLOW-UP CLINIC VISIT    Patient Name:  Cristy Bailey  Date:  3/15/2019  Contact Type:  Telephone/ [phone]    SUBJECTIVE:     Patient Findings     Comments:   ROMÁN Chaidez NR, called to report patient's INR at 2.1. No missed doses. Patient using pill box. Patient reports no changes in meds or diet since last visit. No s/sx of bruising bleeding noted. Patient therapeutic and will continue with current maintenance dosing. Home care nurse will recheck INR in 1 week. Pain is getting better, but still a problem - gabapentin is helping.   Keena CHAPMAN RN  Anticoagulation Clinic  [place]             OBJECTIVE    INR   Date Value Ref Range Status   03/15/2019 2.1 (A) 0.8 - 1.1 Final       ASSESSMENT / PLAN  INR assessment THER    Recheck INR In: 1 WEEK    INR Location [facility] INR      Anticoagulation Summary  As of 3/15/2019    INR goal:   2.0-3.0   TTR:   --   INR used for dosin.1 (3/15/2019)   Warfarin maintenance plan:   2.5 mg (2.5 mg x 1) every day   Full warfarin instructions:   2.5 mg every day   Weekly warfarin total:   17.5 mg   No change documented:   Keena Yo RN   Plan last modified:   Keena Yo RN (3/6/2019)   Next INR check:   3/22/2019   Target end date:   2019    Indications    Acute thromboembolism of deep veins of left lower extremity (H) [I82.402]             Anticoagulation Episode Summary     INR check location:   Clinic Lab    Preferred lab:       Send INR reminders to:    [facility]    Comments:         Anticoagulation Care Providers     Provider Role Specialty Phone number    Manoj Daley PA-C Referring Physician Assistant [phone]            See the Encounter Report to view Anticoagulation Flowsheet and Dosing Calendar (Go to Encounters tab in chart review, and find the Anticoagulation Therapy Visit)    Keena Yo RN

## 2019-03-18 ENCOUNTER — TELEPHONE (OUTPATIENT)
Dept: FAMILY MEDICINE | Facility: CLINIC | Age: 74
End: 2019-03-18

## 2019-03-18 NOTE — TELEPHONE ENCOUNTER
Received Home Health Certification and Plan of Care, placed in [facility] in basket, please sign and give to Dr. Duke to sign.    Then fax back to [phone].

## 2019-03-19 DIAGNOSIS — Z53.9 DIAGNOSIS NOT YET DEFINED: Primary | ICD-10-CM

## 2019-03-19 PROCEDURE — G0180 MD CERTIFICATION HHA PATIENT: HCPCS | Performed by: FAMILY MEDICINE

## 2019-03-20 ENCOUNTER — OFFICE VISIT (OUTPATIENT)
Dept: ORTHOPEDICS | Facility: CLINIC | Age: 74
End: 2019-03-20
Payer: COMMERCIAL

## 2019-03-20 ENCOUNTER — ANCILLARY PROCEDURE (OUTPATIENT)
Dept: GENERAL RADIOLOGY | Facility: CLINIC | Age: 74
End: 2019-03-20
Attending: PHYSICIAN ASSISTANT
Payer: COMMERCIAL

## 2019-03-20 DIAGNOSIS — Z47.89 ORTHOPEDIC AFTERCARE: Primary | ICD-10-CM

## 2019-03-20 DIAGNOSIS — Z96.642 AFTERCARE FOLLOWING LEFT HIP JOINT REPLACEMENT SURGERY: ICD-10-CM

## 2019-03-20 DIAGNOSIS — Z47.1 AFTERCARE FOLLOWING LEFT HIP JOINT REPLACEMENT SURGERY: ICD-10-CM

## 2019-03-20 DIAGNOSIS — Z47.89 ORTHOPEDIC AFTERCARE: ICD-10-CM

## 2019-03-20 DIAGNOSIS — M16.12 PRIMARY OSTEOARTHRITIS OF LEFT HIP: ICD-10-CM

## 2019-03-20 PROCEDURE — 73502 X-RAY EXAM HIP UNI 2-3 VIEWS: CPT

## 2019-03-20 PROCEDURE — 99024 POSTOP FOLLOW-UP VISIT: CPT | Performed by: PHYSICIAN ASSISTANT

## 2019-03-20 RX ORDER — OXYCODONE HYDROCHLORIDE 5 MG/1
5 TABLET ORAL EVERY 6 HOURS PRN
Qty: 30 TABLET | Refills: 0 | Status: SHIPPED | OUTPATIENT
Start: 2019-03-20 | End: 2019-04-03

## 2019-03-20 NOTE — PROGRESS NOTES
"HISTORY OF PRESENT ILLNESS:    Cristy Bailey is a 73 year old female who is seen in follow up for left JENAE, DOS 2/12/19, Dr. Chavo Rodriguez.  Present symptoms:  Patient would like bandages changed on left hip.  She reports mild drainage, and redness near the incision. She states \"something is wrong with it, I know it\". Patient reports continued pain in the lower left leg, pain is severe at times.  She notes that she continues to take Oxycodone at bedtime.  She is continuing with coumadin anticoagulation due to out of pocket cost of alternative anticoagulation.  Using gait belt on left foot to help with foot drop.   Changing bandages every other day.  Can walk from kitchen to living with walker.    Denies Chest pain, Calve pain, Fever, Chills.    Current Treatment: post op.     PHYSICAL EXAM:  There were no vitals taken for this visit.  There is no height or weight on file to calculate BMI.   GENERAL APPEARANCE: healthy, alert and no distress   PSYCH:  mentation appears normal and affect normal/bright    MSK:  Left: Hip .  Ambulates: Present in WC.  Help for transfer.  Incision clean, serous drainage, some dressing saturation present, healing.  Blancheable symmetric distal incisional erythema.   No Ecchymosis.  No calve pain on palpation.  Edema Moderate foot through upper leg.  CMS: joesph incisional numbness, otherwise grossly intact.  Ankle dorsiflexion 3/5 strength, active to -10, PROM to 0.      IMAGING INTERPRETATION:  XR ap pelvis with Left leg.  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  Components are well aligned with no signs of loosening.  No new pathology noted, and no changes in position from previous study.  Images read and interpreted by me, see also radiology reading.     ASSESSMENT:  Cristy Bailey is a 73 year old female S/P  left JENAE, DOS 2/12/19, Dr. Chavo Rodriguez.    Super ficial incisional serous drainage.  Deconditioned.    PLAN:  - Care instructions given " and verbally acknowledged.  - Medications: refill oxycodone.  - Physical therapy: continue with current physical therapy  - AAT.  - hip restrictions  - change dressing daily, using gauze or wicking type dressing under pad.    Return to clinic 2, weeks.    Donal Grover PA-C    Dept. Orthopedic Surgery  [facility]   3/20/2019

## 2019-03-20 NOTE — PATIENT INSTRUCTIONS
Dressing:  Add a layer of clean gauze under the pads to increase wicking of moisture.    Monitor for signs of infection such as increased redness that is asymmetric, painful to the touch, does not turn white with pressure or if the drainage becomes more like pus.    Continue to work on walking.

## 2019-03-22 ENCOUNTER — ANTICOAGULATION THERAPY VISIT (OUTPATIENT)
Dept: NURSING | Facility: CLINIC | Age: 74
End: 2019-03-22
Payer: COMMERCIAL

## 2019-03-22 DIAGNOSIS — I82.402 ACUTE THROMBOEMBOLISM OF DEEP VEINS OF LEFT LOWER EXTREMITY (H): ICD-10-CM

## 2019-03-22 LAB — INR PPP: 2.6 (ref 0.08–1.1)

## 2019-03-22 NOTE — PROGRESS NOTES
ANTICOAGULATION FOLLOW-UP CLINIC VISIT    Patient Name:  Cristy Bailey  Date:  3/22/2019  Contact Type:  Telephone    SUBJECTIVE:     Patient Findings     Comments:   JEEVAN Chaidez with [facility] called ([phone]) to report patient's INR today was 2.6. Patient reports no changes in meds or diet since last visit. No s/sx of bruising bleeding noted. Patient therapeutic and will continue with current maintenance dosing. Nurse will check INR at home again in 2 weeks.   Keena CHAPMAN RN  Anticoagulation Clinic  [place]             OBJECTIVE    INR   Date Value Ref Range Status   2019 2.6 (A) 0.08 - 1.1 Final       ASSESSMENT / PLAN  INR assessment THER    Recheck INR In: 2 WEEKS    INR Location Clinic      Anticoagulation Summary  As of 3/22/2019    INR goal:   2.0-3.0   TTR:   100.0 % (6 d)   INR used for dosin.6 (3/22/2019)   Warfarin maintenance plan:   2.5 mg (2.5 mg x 1) every day   Full warfarin instructions:   2.5 mg every day   Weekly warfarin total:   17.5 mg   No change documented:   Keena Yo RN   Plan last modified:   Keena Yo RN (3/6/2019)   Next INR check:   2019   Target end date:   2019    Indications    Acute thromboembolism of deep veins of left lower extremity (H) [I82.402]             Anticoagulation Episode Summary     INR check location:   Clinic Lab    Preferred lab:       Send INR reminders to:    [facility]    Comments:         Anticoagulation Care Providers     Provider Role Specialty Phone number    Manoj Dalye PA-C Referring Physician Assistant [phone]            See the Encounter Report to view Anticoagulation Flowsheet and Dosing Calendar (Go to Encounters tab in chart review, and find the Anticoagulation Therapy Visit)    Keena Yo RN

## 2019-03-29 ENCOUNTER — TELEPHONE (OUTPATIENT)
Dept: ORTHOPEDICS | Facility: CLINIC | Age: 74
End: 2019-03-29

## 2019-03-29 DIAGNOSIS — Z79.01 LONG TERM CURRENT USE OF ANTICOAGULANT THERAPY: ICD-10-CM

## 2019-03-29 DIAGNOSIS — I82.402 ACUTE THROMBOEMBOLISM OF DEEP VEINS OF LEFT LOWER EXTREMITY (H): ICD-10-CM

## 2019-03-29 RX ORDER — WARFARIN SODIUM 2.5 MG/1
TABLET ORAL
Qty: 90 TABLET | Refills: 0 | Status: SHIPPED | OUTPATIENT
Start: 2019-03-29 | End: 2019-06-26

## 2019-03-29 NOTE — TELEPHONE ENCOUNTER
Reason for call:  Kelley from [facility] called regarding questions about the patient's warfarin intake. She states that the patient has had issues with some bleeding during therapy. She also notes that patient has noted some increased dizziness that the patient believes may be from the warfarin. Kelley states that the patient has skipped at least 1 does of the warfarin and is concerned about the patient's compliance with the treatment plan.    Kelley requests call back to discuss possible long term plan.    Phone number to reach patient:  Other phone number:  [phone]    Can we leave a detailed message on this number?  unsure    Return call to Kelley. Kelley states that when she saw patient today, patient was unsure when she had taken her warfarin this week, but thought she had only skipped one dose. Kelley states that patient has experienced some dizziness but is unsure if it was caused by the warfarin. Patient denies any falls. Kelley states the patient was going to resume warfarin today and does think that the patient is fine until her follow up appointment with David Grover PA-C on 4/3/19.    Kelley's main concern is she is unsure if the patient will be compliant with the warfarin long term after home care is completed.    Routing to patient's PCP as well.    Ashley Bear, ATC

## 2019-04-03 ENCOUNTER — OFFICE VISIT (OUTPATIENT)
Dept: ORTHOPEDICS | Facility: CLINIC | Age: 74
End: 2019-04-03
Payer: COMMERCIAL

## 2019-04-03 VITALS — DIASTOLIC BLOOD PRESSURE: 82 MMHG | SYSTOLIC BLOOD PRESSURE: 126 MMHG

## 2019-04-03 DIAGNOSIS — Z47.1 AFTERCARE FOLLOWING LEFT HIP JOINT REPLACEMENT SURGERY: ICD-10-CM

## 2019-04-03 DIAGNOSIS — M16.12 PRIMARY OSTEOARTHRITIS OF LEFT HIP: ICD-10-CM

## 2019-04-03 DIAGNOSIS — Z96.642 AFTERCARE FOLLOWING LEFT HIP JOINT REPLACEMENT SURGERY: ICD-10-CM

## 2019-04-03 PROCEDURE — 99024 POSTOP FOLLOW-UP VISIT: CPT | Performed by: PHYSICIAN ASSISTANT

## 2019-04-03 RX ORDER — OXYCODONE HYDROCHLORIDE 5 MG/1
5 TABLET ORAL EVERY 6 HOURS PRN
Qty: 30 TABLET | Refills: 0 | Status: SHIPPED | OUTPATIENT
Start: 2019-04-03 | End: 2019-04-17

## 2019-04-03 NOTE — LETTER
4/3/2019         RE: Cristy Bailey  [address]  [place] [address]        Dear Colleague,    Thank you for referring your patient, rCisty Bailey, to the [facility] ORTHOPEDIC SURGERY. Please see a copy of my visit note below.    HISTORY OF PRESENT ILLNESS:    Cristy Bailey is a 73 year old female who is seen in follow up for left JENAE, DOS 2/12/19, Dr. Chavo Rodriguez.  Present symptoms: Patient reports continued pain in the left lower leg. She states pain varies and is 3/10 today, up to 7/10 at times. She denies pain in her left hip.  She believes there was a small amount of drainage with last bandage change performed by home health nurse 2 days ago. She states she has been working on her foot range of motion, but is limited by her pain in the foot. Her partner Leo states at times her foot is so painful he cannot even place a finger on her.  She denies working on her walking throughout the day, and states it is very minimal and only with transfers to and from the wheel chair, and to use the restroom. Can however walk 20 feet and stand at sink to wash hair.  Does have complaints of balance issues if tries to leg go of walker. Is taking Oxycodone average 1-2 per day. Used 24 tabs in last 14 days.  Denies Chest pain, Calve pain, Fever, Chills.    Current Treatment: Oxycodone patient has 6 tabs remaining, takes primarily at nighttime to help sleep. Requests refill today.  Post op.    PHYSICAL EXAM:  /82 (BP Location: Right arm, Patient Position: Chair, Cuff Size: Adult Large)   There is no height or weight on file to calculate BMI.   GENERAL APPEARANCE: healthy, alert and no distress   PSYCH:  mentation appears normal and affect normal/bright/ less tearful today.  MSK:  Left: Hip .  Ambulates: Present in WC, transfers without help, uses strap for left foot.  Incision clean, creamy drainage present in mid and proximal incision where skin edges did not approximate, otherwise healing.  No  incisional erythema.   No Ecchymosis.  No calve pain on palpation.  Edema Moderate to severe at foot and lower leg, pulses intact, warm to touch.  CMS: joesph incisional numbness, otherwise grossly intact, FULL SENSATION BOTTOM FOOT.  AROM ANkle:  Dorsiflexiion 10, P flexion 45.  PROM:  Ankle DOrsiflexion -15, P flexion 45.  Strength, ANkle dorsiflexion 4/5  Palpation:  No pain at hip or foot.       ASSESSMENT:  Cristy Bailey is a 73 year old female S/P left JENAE, DOS 2/12/19, Dr. Chavo Rodriguez.     Drainage appears to be more serous weeping at overlapping skin edges vs from deep wound.  ANkle ROM appears about the same.     PLAN:  - daily incision checks.  - WBAT - encouraged as much activity as tolerated.  - Hip restrictions.  - Refill oxycodone, with understanding needs to taper.    Return to clinic 2, weeks.    Donal Grover PA-C    Dept. Orthopedic Surgery  [facility]   4/3/2019        Again, thank you for allowing me to participate in the care of your patient.        Sincerely,        Donal Grover PA-C

## 2019-04-03 NOTE — PATIENT INSTRUCTIONS
Daily incision checks, keep incision clean and covered.  Try to walk as much as possible.  Follow up in 2 weeks.

## 2019-04-03 NOTE — TELEPHONE ENCOUNTER
Called and left a message for Kelley to call us back.        Called and left a message for the pt to call us back.

## 2019-04-03 NOTE — TELEPHONE ENCOUNTER
Dizziness not a symptom of warfarin. Please let patient know this and the importance of medication compliance with the warfarin to avoid another clot.

## 2019-04-04 NOTE — TELEPHONE ENCOUNTER
Kelley from home care called and given patient response.     Homecare will be with patient 4/5 and check INR.    Ellie Coon RN

## 2019-04-05 ENCOUNTER — ANTICOAGULATION THERAPY VISIT (OUTPATIENT)
Dept: NURSING | Facility: CLINIC | Age: 74
End: 2019-04-05
Payer: COMMERCIAL

## 2019-04-05 DIAGNOSIS — I82.402 ACUTE THROMBOEMBOLISM OF DEEP VEINS OF LEFT LOWER EXTREMITY (H): ICD-10-CM

## 2019-04-05 LAB — INR PPP: 1.9 (ref 0.8–1.1)

## 2019-04-05 NOTE — PROGRESS NOTES
ANTICOAGULATION FOLLOW-UP CLINIC VISIT    Patient Name:  Cristy Bailey  Date:  2019  Contact Type:  Telephone/ see below    SUBJECTIVE:     Patient Findings     Positives:   Missed doses    Comments:   JEEVAN Benson from [facility], [phone], left VM that patient's INR was 1.9  Called Kelley back: The patient states she missed for sure one dose last week, possibly two (can't remember the day). This week, has taken every dose of warfarin as prescribed. No other changes noted, per patient and RN. Advised to continue on same dose of 2.5 mg daily and to recheck INR in 2 weeks. Kelley stated understanding.  Keena CHAPMAN RN  Anticoagulation Clinic  [place]             OBJECTIVE    INR   Date Value Ref Range Status   2019 1.9 (A) 0.8 - 1.1 Final       ASSESSMENT / PLAN  INR assessment THER    Recheck INR In: 2 WEEKS    INR Location Homecare INR      Anticoagulation Summary  As of 2019    INR goal:   2.0-3.0   TTR:   90.0 % (2.9 wk)   INR used for dosin.9! (2019)   Warfarin maintenance plan:   2.5 mg (2.5 mg x 1) every day   Full warfarin instructions:   2.5 mg every day   Weekly warfarin total:   17.5 mg   No change documented:   Keena Yo RN   Plan last modified:   Keena Yo RN (3/6/2019)   Next INR check:   2019   Target end date:   2019    Indications    Acute thromboembolism of deep veins of left lower extremity (H) [I82.402]             Anticoagulation Episode Summary     INR check location:   Clinic Lab    Preferred lab:       Send INR reminders to:    [facility]    Comments:         Anticoagulation Care Providers     Provider Role Specialty Phone number    Manoj Daley PA-C Referring Physician Assistant [phone]            See the Encounter Report to view Anticoagulation Flowsheet and Dosing Calendar (Go to Encounters tab in chart review, and find the Anticoagulation Therapy Visit)    Keena Yo RN

## 2019-04-06 ENCOUNTER — TRANSFERRED RECORDS (OUTPATIENT)
Dept: HEALTH INFORMATION MANAGEMENT | Facility: CLINIC | Age: 74
End: 2019-04-06

## 2019-04-12 ENCOUNTER — ANTICOAGULATION THERAPY VISIT (OUTPATIENT)
Dept: NURSING | Facility: CLINIC | Age: 74
End: 2019-04-12
Payer: COMMERCIAL

## 2019-04-12 DIAGNOSIS — I82.402 ACUTE THROMBOEMBOLISM OF DEEP VEINS OF LEFT LOWER EXTREMITY (H): ICD-10-CM

## 2019-04-12 LAB — INR PPP: 2.1 (ref 0.8–1.1)

## 2019-04-12 PROCEDURE — 99207 ZZC NO CHARGE NURSE ONLY: CPT | Performed by: PHYSICIAN ASSISTANT

## 2019-04-12 NOTE — PROGRESS NOTES
ANTICOAGULATION FOLLOW-UP CLINIC VISIT    Patient Name:  Cristy Bailey  Date:  2019  Contact Type:  Face to Face    SUBJECTIVE:     Patient Findings     Positives:   Signs/symptoms of bleeding (bloody nose 19 noted when getting up to go to the bathroom, stopped easily per homecare RN. No bleeding noted in stool or elsewhere), Change in health (on/off dizziness noted, discussed at last visit with surgeon as well. BP today around 120/70, orthostatic BP not checked by homecare RN)    Comments:   JEEVAN Benson with [facility], left message at 1009 that patient's INR today was 2.1. Health changes noted as above. Homecare nurse will continue to monitor. Advised to continue with same dosing and recheck INR in 2 weeks. Kelley stated understanding.    Keena CHAPMAN RN  Anticoagulation Clinic  [place]             OBJECTIVE    INR   Date Value Ref Range Status   2019 2.1 (A) 0.8 - 1.1 Final       ASSESSMENT / PLAN  INR assessment THER    Recheck INR In: 2 WEEKS    INR Location Homecare INR      Anticoagulation Summary  As of 2019    INR goal:   2.0-3.0   TTR:   79.6 % (3.9 wk)   INR used for dosin.1 (2019)   Warfarin maintenance plan:   2.5 mg (2.5 mg x 1) every day   Full warfarin instructions:   2.5 mg every day   Weekly warfarin total:   17.5 mg   No change documented:   Keena Yo RN   Plan last modified:   Keena Yo RN (3/6/2019)   Next INR check:   2019   Target end date:   2019    Indications    Acute thromboembolism of deep veins of left lower extremity (H) [I82.402]             Anticoagulation Episode Summary     INR check location:   Clinic Lab    Preferred lab:       Send INR reminders to:    [facility]    Comments:         Anticoagulation Care Providers     Provider Role Specialty Phone number    Manoj Daley PA-C Referring Physician Assistant [phone]            See the Encounter Report to view Anticoagulation Flowsheet and Dosing Calendar (Go to  Encounters tab in chart review, and find the Anticoagulation Therapy Visit)    Keena Yo RN

## 2019-04-13 ENCOUNTER — MYC MEDICAL ADVICE (OUTPATIENT)
Dept: FAMILY MEDICINE | Facility: CLINIC | Age: 74
End: 2019-04-13

## 2019-04-15 NOTE — TELEPHONE ENCOUNTER
Called patient after receiving MyC message. Patient states she wasn't sure if she was supposed to continue taking one 2.5 mg warfarin every night (takes it with 2 gabapentin). Advised that this is the correct dosage for warfarin. Her most recent INR was 2.1 which is within her therapeutic range. Patient was happy with this, she stated she just couldn't remember, but she has been taking the warfarin as prescribed.    When asked, patient states she has not had any more bloody noses or other bruising/bleeding noted and also no more dizziness. Patient had no further questions or concerns.    Keena CHAPMAN, Triage RN

## 2019-04-17 ENCOUNTER — MYC MEDICAL ADVICE (OUTPATIENT)
Dept: ORTHOPEDICS | Facility: CLINIC | Age: 74
End: 2019-04-17

## 2019-04-17 ENCOUNTER — OFFICE VISIT (OUTPATIENT)
Dept: ORTHOPEDICS | Facility: CLINIC | Age: 74
End: 2019-04-17
Payer: COMMERCIAL

## 2019-04-17 VITALS — DIASTOLIC BLOOD PRESSURE: 62 MMHG | SYSTOLIC BLOOD PRESSURE: 122 MMHG

## 2019-04-17 DIAGNOSIS — Z47.1 AFTERCARE FOLLOWING LEFT HIP JOINT REPLACEMENT SURGERY: Primary | ICD-10-CM

## 2019-04-17 DIAGNOSIS — Z96.642 AFTERCARE FOLLOWING LEFT HIP JOINT REPLACEMENT SURGERY: Primary | ICD-10-CM

## 2019-04-17 DIAGNOSIS — M16.12 PRIMARY OSTEOARTHRITIS OF LEFT HIP: ICD-10-CM

## 2019-04-17 PROCEDURE — 99024 POSTOP FOLLOW-UP VISIT: CPT | Performed by: PHYSICIAN ASSISTANT

## 2019-04-17 RX ORDER — OXYCODONE HYDROCHLORIDE 5 MG/1
5 TABLET ORAL EVERY 6 HOURS PRN
Qty: 25 TABLET | Refills: 0 | Status: SHIPPED | OUTPATIENT
Start: 2019-04-17 | End: 2019-05-17

## 2019-04-17 NOTE — PATIENT INSTRUCTIONS
Continue every other day minimum incision checks.    OK to get incision wet in the shower.    Continue to work on foot strength and walking    Follow up in 4 weeks.

## 2019-04-19 ENCOUNTER — ANTICOAGULATION THERAPY VISIT (OUTPATIENT)
Dept: NURSING | Facility: CLINIC | Age: 74
End: 2019-04-19
Payer: COMMERCIAL

## 2019-04-19 DIAGNOSIS — I82.402 ACUTE THROMBOEMBOLISM OF DEEP VEINS OF LEFT LOWER EXTREMITY (H): ICD-10-CM

## 2019-04-19 LAB — INR PPP: 2.2 (ref 0.08–1.1)

## 2019-04-19 NOTE — PROGRESS NOTES
ANTICOAGULATION FOLLOW-UP CLINIC VISIT    Patient Name:  Cristy Bailey  Date:  2019  Contact Type:  Telephone/ [phone]    SUBJECTIVE:     Patient Findings     Comments:   Patient previously advised to check in two weeks, but [facility] RN called in with an INR today. The patient was assessed for diet, medication, and activity level changes, missed or extra doses, bruising or bleeding, with no problem findings.  Attempted to call back Kelley with advice to continue with 2.5 mg warfarin daily and recheck INR in two weeks on May 3rd. However, mailbox was full and could not leave a message. Called main [facility] line: They will page Kelley to call back.  Kelley called back. Is in agreement with the plan.  Keena CHAPMAN RN  Anticoagulation Clinic  [place]             OBJECTIVE    INR   Date Value Ref Range Status   2019 2.2 (A) 0.08 - 1.1 Final       ASSESSMENT / PLAN  INR assessment THER    Recheck INR In: 2 WEEKS    INR Location Homecare INR      Anticoagulation Summary  As of 2019    INR goal:   2.0-3.0   TTR:   83.8 % (1.1 mo)   INR used for dosin.2 (2019)   Warfarin maintenance plan:   2.5 mg (2.5 mg x 1) every day   Full warfarin instructions:   2.5 mg every day   Weekly warfarin total:   17.5 mg   No change documented:   Keena Yo RN   Plan last modified:   Keena Yo RN (3/6/2019)   Next INR check:   5/3/2019   Target end date:   2019    Indications    Acute thromboembolism of deep veins of left lower extremity (H) [I82.402]             Anticoagulation Episode Summary     INR check location:   Clinic Lab    Preferred lab:       Send INR reminders to:   [facility]    Comments:         Anticoagulation Care Providers     Provider Role Specialty Phone number    Manoj Daley PA-C Referring Physician Assistant [phone]            See the Encounter Report to view Anticoagulation Flowsheet and Dosing Calendar (Go to Encounters tab in chart review, and find the  Anticoagulation Therapy Visit)    Keena Yo RN

## 2019-04-23 ENCOUNTER — TELEPHONE (OUTPATIENT)
Dept: FAMILY MEDICINE | Facility: CLINIC | Age: 74
End: 2019-04-23

## 2019-04-23 NOTE — TELEPHONE ENCOUNTER
Previously had order for OT for lymphedema eval, they were unable to get out within the 10 days of that order. Is it ok to extend that eval order another 10 days and they will get out asap  Thanks!  Kelley

## 2019-04-24 ENCOUNTER — MYC MEDICAL ADVICE (OUTPATIENT)
Dept: FAMILY MEDICINE | Facility: CLINIC | Age: 74
End: 2019-04-24

## 2019-04-24 DIAGNOSIS — I82.402 ACUTE THROMBOEMBOLISM OF DEEP VEINS OF LEFT LOWER EXTREMITY (H): ICD-10-CM

## 2019-04-24 DIAGNOSIS — Z79.01 LONG-TERM (CURRENT) USE OF ANTICOAGULANTS, INR GOAL 2.0-3.0: Primary | ICD-10-CM

## 2019-04-25 NOTE — TELEPHONE ENCOUNTER
Called patient: Informed her for a single tooth extraction the protocol is to have the INR at the low end of normal or to have held her warfarin for 48 hours. Patient states she only held it last night.     As of 4/19/19, patient's INR was 2.2. Advised her to call her dentist as soon as their office opens this morning to inform them of her INR on 4/19 and let them know she held warfarin last night only, then to call INR nurse back to see how the dentist would like to proceed. They may require a more current INR, lab order entered if needed.    Called homecare nurse to notify of situation as they are the ones that visit patient and usually draw her INR. The main office will page Cristy's nurse, Kelley.    Keena CHAPMAN RN  Anticoagulation Clinic  [place]

## 2019-04-25 NOTE — TELEPHONE ENCOUNTER
Per Kelley [facility], dentist was ok with previous INR value and proceeded with tooth extraction.    Kelley would like to do next INR on 5/3, rather than 5/5. This will work fine.    Keena CHAPMAN RN  Anticoagulation Clinic  [place]

## 2019-04-25 NOTE — TELEPHONE ENCOUNTER
Kelley from [facility] returned call. Filled her in on the situation. Kelley will contact patient to see what the dentist said. If needed, she may be able to stop by patient's home to check INR today. Will await her or the patient's call back.    Keena CHAPMAN RN  Anticoagulation Clinic  [place]

## 2019-05-01 ENCOUNTER — DOCUMENTATION ONLY (OUTPATIENT)
Dept: FAMILY MEDICINE | Facility: CLINIC | Age: 74
End: 2019-05-01

## 2019-05-01 ENCOUNTER — MYC MEDICAL ADVICE (OUTPATIENT)
Dept: FAMILY MEDICINE | Facility: CLINIC | Age: 74
End: 2019-05-01

## 2019-05-01 ENCOUNTER — ANTICOAGULATION THERAPY VISIT (OUTPATIENT)
Dept: NURSING | Facility: CLINIC | Age: 74
End: 2019-05-01
Payer: COMMERCIAL

## 2019-05-01 DIAGNOSIS — M79.605 BILATERAL LEG PAIN: ICD-10-CM

## 2019-05-01 DIAGNOSIS — R29.898 WEAKNESS OF BOTH LOWER EXTREMITIES: ICD-10-CM

## 2019-05-01 DIAGNOSIS — M79.604 BILATERAL LEG PAIN: ICD-10-CM

## 2019-05-01 DIAGNOSIS — I82.402 ACUTE THROMBOEMBOLISM OF DEEP VEINS OF LEFT LOWER EXTREMITY (H): ICD-10-CM

## 2019-05-01 LAB — INR PPP: 1.6 (ref 0.8–1.1)

## 2019-05-01 NOTE — PROGRESS NOTES
ANTICOAGULATION FOLLOW-UP CLINIC VISIT    Patient Name:  Cristy Bailey  Date:  2019  Contact Type:  Telephone    SUBJECTIVE:     Patient Findings     Positives:   Missed doses (see below)    Comments:   Kelley from [facility] called ([phone]) with today's INR: 1.6. Notes patient held her warfarin the day before and the day of a recent tooth extraction. Since then she has been on her maintenance dose of 2.5 mg warfarin daily. The patient was assessed for diet, medication, and activity level changes, bruising or bleeding, with no problem findings.  Increased dose today per protocol and advised to then continue with maintenance dosing and recheck in 1 week. HC nurse stated understanding and is in agreement.  Keena CHAPMAN RN  Anticoagulation Clinic  [place]               OBJECTIVE    INR   Date Value Ref Range Status   2019 1.6 (A) 0.8 - 1.1 Final       ASSESSMENT / PLAN  INR assessment SUB    Recheck INR In: 1 WEEK    INR Location Homecare INR      Anticoagulation Summary  As of 2019    INR goal:   2.0-3.0   TTR:   70.7 % (1.5 mo)   INR used for dosin.6! (2019)   Warfarin maintenance plan:   2.5 mg (2.5 mg x 1) every day   Full warfarin instructions:   : 3.75 mg; Otherwise 2.5 mg every day   Weekly warfarin total:   17.5 mg   Plan last modified:   Keena Yo RN (3/6/2019)   Next INR check:   2019   Target end date:   2019    Indications    Acute thromboembolism of deep veins of left lower extremity (H) [I82.402]             Anticoagulation Episode Summary     INR check location:   Clinic Lab    Preferred lab:       Send INR reminders to:    [facility]    Comments:         Anticoagulation Care Providers     Provider Role Specialty Phone number    Manoj Daley PA-C Referring Physician Assistant [phone]            See the Encounter Report to view Anticoagulation Flowsheet and Dosing Calendar (Go to Encounters tab in chart review, and find the Anticoagulation  Therapy Visit)    Dosage adjustment made based on physician directed care plan.    Keena Yo RN

## 2019-05-01 NOTE — TELEPHONE ENCOUNTER
Routing refill request to provider for review/approval because:  Drug not on the [facility] refill protocol     Last Written Prescription Date:  1/10/19  Last Fill Quantity: 180,  # refills: 1   Last office visit: 1/28/2019 with prescribing provider:    Future Office Visit:        * 3/1/19: patient discharged from nursing home following surgery.     Linda Garrison RN Flex

## 2019-05-01 NOTE — TELEPHONE ENCOUNTER
Requested Prescriptions   Pending Prescriptions Disp Refills     gabapentin (NEURONTIN) 300 MG capsule [Pharmacy Med Name: Gabapentin Oral Capsule 300 MG] 180 capsule 0     Sig: Take 1 CAPSULE BY MOUTH IN THE MORNING, TAKE 1 CAPSULE BY MOUTH AT MIDDAY AND TAKE 2 CAPSULES BY MOUTH AT NIGHT.       There is no refill protocol information for this order        Last Written Prescription Date:  01/10/2019  Last Fill Quantity: 180 capsule,  # refills: 1    Last office visit:01/28/2019 with prescribing provider:  Manoj Daley PA-C        Future Office Visit:      Routing refill request to provider for review/approval because:  Drug not on the [facility], [facility] or [facility] refill protocol or controlled substance

## 2019-05-02 RX ORDER — GABAPENTIN 300 MG/1
CAPSULE ORAL
Qty: 180 CAPSULE | Refills: 0 | Status: SHIPPED | OUTPATIENT
Start: 2019-05-02 | End: 2019-06-03

## 2019-05-02 RX ORDER — GABAPENTIN 300 MG/1
CAPSULE ORAL
Qty: 180 CAPSULE | Refills: 1 | OUTPATIENT
Start: 2019-05-02

## 2019-05-02 NOTE — PROGRESS NOTES
[facility] now requests orders and shares plan of care/discharge summaries for some patients through [employer].  Please REPLY TO THIS MESSAGE OR ROUTE BACK TO THE AUTHOR in order to give authorization for orders when needed.  This is considered a verbal order, you will still receive a faxed copy of orders for signature.  Thank you for your assistance in improving collaboration for our patients.    ORDER   snv 1 x wk x 9, 2 prn, continue OT per plan of care    MD SUMMARY/PLAN OF CARE  Recert visit. Client requiring ongoing OT for lymphedema therapy. Nursing for med management, teaching, set up, INRs, assess safety, cvr status.

## 2019-05-08 ENCOUNTER — MYC REFILL (OUTPATIENT)
Dept: ORTHOPEDICS | Facility: CLINIC | Age: 74
End: 2019-05-08

## 2019-05-08 ENCOUNTER — TELEPHONE (OUTPATIENT)
Dept: FAMILY MEDICINE | Facility: CLINIC | Age: 74
End: 2019-05-08

## 2019-05-08 ENCOUNTER — ANTICOAGULATION THERAPY VISIT (OUTPATIENT)
Dept: NURSING | Facility: CLINIC | Age: 74
End: 2019-05-08
Payer: COMMERCIAL

## 2019-05-08 DIAGNOSIS — I82.402 ACUTE THROMBOEMBOLISM OF DEEP VEINS OF LEFT LOWER EXTREMITY (H): ICD-10-CM

## 2019-05-08 DIAGNOSIS — Z96.642 AFTERCARE FOLLOWING LEFT HIP JOINT REPLACEMENT SURGERY: ICD-10-CM

## 2019-05-08 DIAGNOSIS — Z47.1 AFTERCARE FOLLOWING LEFT HIP JOINT REPLACEMENT SURGERY: ICD-10-CM

## 2019-05-08 DIAGNOSIS — M16.12 PRIMARY OSTEOARTHRITIS OF LEFT HIP: ICD-10-CM

## 2019-05-08 LAB — INR PPP: 1.6

## 2019-05-08 NOTE — TELEPHONE ENCOUNTER
Received Home Health Certification and Plan of Care, placed in Kulwant's in basket.    Please review, sign and fax back to [phone].

## 2019-05-08 NOTE — PROGRESS NOTES
ANTICOAGULATION FOLLOW-UP CLINIC VISIT    Patient Name:  Cristy Bailey  Date:  2019  Contact Type:  Telephone/ [phone]    SUBJECTIVE:  Patient Findings     Positives:   Change in health (decreased lymphedema, walking more)    Comments:   SANDY Benson, called and left message that patient's INR today is still 1.6. She took the extra dose of warfarin last Wednesday. No other changes except patient is getting lymphedema wraps and they are starting to see some improvement.   Called Kelley back: Advised to increase dose today by 2.5 mg today then return to regular dosing and recheck on Monday. Kelley stated understanding and is in agreement with plan.  Keena CHAPMAN RN  Anticoagulation Clinic  [place]            Clinical Outcomes     Comments:   SANDY Benson, called and left message that patient's INR today is still 1.6. She took the extra dose of warfarin last Wednesday. No other changes except patient is getting lymphedema wraps and they are starting to see some improvement.   Called Kelley back: Advised to increase dose today by 2.5 mg today then return to regular dosing and recheck on Monday. Kelley stated understanding and is in agreement with plan.  Keena CHAPMAN RN  Anticoagulation Clinic  [place]               OBJECTIVE    INR   Date Value Ref Range Status   2019 1.6  Final       ASSESSMENT / PLAN  INR assessment SUB    Recheck INR In: 5 DAYS    INR Location Homecare INR      Anticoagulation Summary  As of 2019    INR goal:   2.0-3.0   TTR:   61.3 % (1.8 mo)   INR used for dosin.6! (2019)   Warfarin maintenance plan:   2.5 mg (2.5 mg x 1) every day   Full warfarin instructions:   : 5 mg; Otherwise 2.5 mg every day   Weekly warfarin total:   17.5 mg   Plan last modified:   Keena Yo RN (3/6/2019)   Next INR check:   2019   Target end date:   2019    Indications    Acute thromboembolism of deep veins of left lower extremity (H) [I82.402]             Anticoagulation Episode  Summary     INR check location:   Clinic Lab    Preferred lab:       Send INR reminders to:   [facility]    Comments:   2.5 mg tabs      Anticoagulation Care Providers     Provider Role Specialty Phone number    Manoj Daley PA-C Referring Physician Assistant [phone]            See the Encounter Report to view Anticoagulation Flowsheet and Dosing Calendar (Go to Encounters tab in chart review, and find the Anticoagulation Therapy Visit)    Dosage adjustment made based on physician directed care plan.    Keena Yo RN

## 2019-05-09 RX ORDER — OXYCODONE HYDROCHLORIDE 5 MG/1
5 TABLET ORAL EVERY 6 HOURS PRN
Qty: 25 TABLET | Refills: 0 | OUTPATIENT
Start: 2019-05-09

## 2019-05-09 NOTE — TELEPHONE ENCOUNTER
Medication Request for:  Oxycodone 5mg  Patient currently taking: at bedtime, while traveling, and with severe pain in the daytime.   Patient has 6 of medication remaining.  Patient is also taking OTC: Tylenol    Problems/ Concerns of Patient: no side effects reported  Prescription last written on 4/17/19 by David Grover PA-C  Sig: Take 1 tablet (5 mg) by mouth every 6 hours as needed for pain - Oral   Last Fill Quantity: 25 tablet with # refills: 0     Last Office Visit by provider: 4/17/19  Date of Surgery (if applicable): 2/12/19  Procedure Performed (if applicable): left TKA  Future Office Visit:   None scheduled  Patient would like to have Rx walked down to [facility]. Please call to notify.     Phone number patient can be reached at: Home number on file [phone] (home)    Can we leave a detailed message on this number? NO    Medication requests may take up to 3 business days for a response.   Has patient been notified of this Yes.

## 2019-05-09 NOTE — TELEPHONE ENCOUNTER
Spoke to patient discussed that David was unable to refill patient's request for medication refill of Oxycodone 5mg.  Patient was understanding that due to her being greater than 3 months from  left TKA it did not follow protocol. Patient does admit that most of her pain is of her lower leg and foot.  Patient was encouraged to reach out to PCP who may be able to manage this medication for patient long term in addition to her Gabapentin regimen. She was agreeable to reaching out to her primary.    Phone Number patient can be reached at:  Home number on file [phone] (home)    Patient did hang up on caller, and sounded tearful.     Keira Santiago, ATC

## 2019-05-10 DIAGNOSIS — Z53.9 DIAGNOSIS NOT YET DEFINED: Primary | ICD-10-CM

## 2019-05-10 PROCEDURE — G0179 MD RECERTIFICATION HHA PT: HCPCS | Performed by: FAMILY MEDICINE

## 2019-05-13 ENCOUNTER — ANTICOAGULATION THERAPY VISIT (OUTPATIENT)
Dept: FAMILY MEDICINE | Facility: CLINIC | Age: 74
End: 2019-05-13
Payer: COMMERCIAL

## 2019-05-13 DIAGNOSIS — I82.402 ACUTE THROMBOEMBOLISM OF DEEP VEINS OF LEFT LOWER EXTREMITY (H): ICD-10-CM

## 2019-05-13 LAB — INR PPP: 2.1 (ref 0.8–1.1)

## 2019-05-13 PROCEDURE — 99207 ZZC NO CHARGE NURSE ONLY: CPT | Performed by: PHYSICIAN ASSISTANT

## 2019-05-13 NOTE — PROGRESS NOTES
ANTICOAGULATION FOLLOW-UP CLINIC VISIT    Patient Name:  Cristy Bailey  Date:  2019  Contact Type:  Telephone/ [facility]    SUBJECTIVE:  Patient Findings     Comments:   The patient was assessed for diet, medication, and activity level changes, missed or extra doses, bruising or bleeding, with no problem findings.  Clinical assessment completed with home care nurse Kelley.          Clinical Outcomes     Comments:   The patient was assessed for diet, medication, and activity level changes, missed or extra doses, bruising or bleeding, with no problem findings.  Clinical assessment completed with home care nurse Kelley.             OBJECTIVE    INR   Date Value Ref Range Status   2019 2.1 (A) 0.8 - 1.1 Final       ASSESSMENT / PLAN  INR assessment THER    Recheck INR In: 1 WEEK    INR Location Clinic      Anticoagulation Summary  As of 2019    INR goal:   2.0-3.0   TTR:   57.8 % (1.9 mo)   INR used for dosin.1 (2019)   Warfarin maintenance plan:   5 mg (2.5 mg x 2) every Wed; 2.5 mg (2.5 mg x 1) all other days   Full warfarin instructions:   5/15: 5 mg; Otherwise 5 mg every Wed; 2.5 mg all other days   Weekly warfarin total:   20 mg   Plan last modified:   Keena Aquino RN (2019)   Next INR check:   2019   Target end date:   2019    Indications    Acute thromboembolism of deep veins of left lower extremity (H) [I82.402]             Anticoagulation Episode Summary     INR check location:   Clinic Lab    Preferred lab:       Send INR reminders to:   [facility]    Comments:   2.5 mg tabs      Anticoagulation Care Providers     Provider Role Specialty Phone number    Manoj Daley PA-C Referring Physician Assistant [phone]            See the Encounter Report to view Anticoagulation Flowsheet and Dosing Calendar (Go to Encounters tab in chart review, and find the Anticoagulation Therapy Visit)    Patient INR is therapeutic.  Patient will continue  to take 20 mg weekly dosing and follow up in 1 week or sooner for any problems or concerns.        Keena Bañuelos RN

## 2019-05-14 ENCOUNTER — MYC MEDICAL ADVICE (OUTPATIENT)
Dept: FAMILY MEDICINE | Facility: CLINIC | Age: 74
End: 2019-05-14

## 2019-05-16 ENCOUNTER — TELEPHONE (OUTPATIENT)
Dept: FAMILY MEDICINE | Facility: CLINIC | Age: 74
End: 2019-05-16

## 2019-05-16 NOTE — TELEPHONE ENCOUNTER
"Patient attempting to schedule appt through my chart with message: \"I had hip replacement and my legs and feet are swelling and my left big toe is red and very painful.\"    Called patient: No answer, no message machine.  Returned MyC message advising patient to contact her homecare nurse or to call triage.    Next 5 appointments (look out 90 days)    May 30, 2019  2:20 PM CDT  Nguyen Nath with Manoj Daley PA-C  [facility] ([facility] [address] [address] [phone]        Keena CHAPMAN, Triage RN        "

## 2019-05-16 NOTE — PROGRESS NOTES
SUBJECTIVE:   Cristy Bailey is a 73 year old female who presents to clinic today for the following   health issues:    Musculoskeletal problem/pain      Duration: Ongoing     Description  Location: legs, feet, left great toe     Intensity:  moderate    Accompanying signs and symptoms: swelling, shooting pain and redness    History  Previous similar problem: no   Previous evaluation:  none    Precipitating or alleviating factors:  Trauma or overuse: no   Aggravating factors include: none    Therapies tried and outcome: compression socks     Cristy presents today for pain evaluation.   S/P left JENAE, DOS 2/12/19, Dr. Chavo Rodriguez.  S/P Right hip I&D 8/30/2018, and R JENAE DOS 7/30/2018, Dr. Garcia  She has been getting lymphedema care via home care but these finished last week  She has been discharged from Ortho  She continues to have leg pain towards the distal parts  Additionally there is pain in the left great toe   -it is swollen and red, increasing over the past two months  It is painful even without weight bearing  She is using narcotics at night for sleep      Additional history: as documented    Reviewed  and updated as needed this visit by clinical staff  Tobacco  Allergies  Meds  Med Hx  Surg Hx  Fam Hx  Soc Hx        Reviewed and updated as needed this visit by Provider         Patient Active Problem List   Diagnosis     HTN (hypertension)     Hyperlipidemia LDL goal <160     Abnormal glucose     Obesity     Leg weakness, bilateral     CRP elevated     Vitamin D deficiency     Health Care Home     HTN, goal below 140/90     Constipation     Leg weakness     Constipation     Myalgia and myositis     Morbid obesity (H)     Avascular necrosis of bone of hip, right (H)     Hip osteoarthritis     Status post THR (total hip replacement)     Acute thromboembolism of deep veins of left lower extremity (H)     Past Surgical History:   Procedure Laterality Date     ARTHROPLASTY HIP Right 7/30/2018    Procedure:  ARTHROPLASTY HIP;  Right total hip arthroplasty;  Surgeon: Bry Garcia MD;  Location: [facility] OR     ARTHROPLASTY HIP Left 2019    Procedure: Left total hip arthroplasty ([employer] 60 mm acetabulum with 2 screws; #15 standard neck Synergy stem; +0 neck 36 mm head) (extra difficult case because of her high BMI and deep subcutaneous fatty layer resulting in the operative time at least twice as long from typical operative time);  Surgeon: Chavo Rodriguez MD;  Location: [facility] OR     BIOPSY OF UTERUS LINING  3/2010    negative.      C C-SEC ONLY,PREV C-SEC      c-sec x 3      COLONOSCOPY  2/21/10    benign findings. advised 10 yr f/u.      INCISION AND DRAINAGE HIP, COMBINED Right 2018    Procedure: COMBINED INCISION AND DRAINAGE HIP;  Incision and debridement, right hip ;  Surgeon: Bry Garcia MD;  Location: [facility] OR     ORTHOPEDIC SURGERY Right 2018    Right hip I & D 18, Right JENAE, DOS 2018, Dr. Garcia. [facility]       Social History     Tobacco Use     Smoking status: Former Smoker     Last attempt to quit: 1984     Years since quittin.8     Smokeless tobacco: Never Used   Substance Use Topics     Alcohol use: No     Family History   Problem Relation Age of Onset     Cerebrovascular Disease Father         -stroke at age 80     Family History Negative Mother          Diedn her 80's of unknown causes.  Pt otherwise unaware of her health hx.      Unknown/Adopted Mother      Diabetes Brother         (Christian)  of DM complications at age 50.     Alcohol/Drug Brother         Christian     C.A.D. Brother         Christian.      Heart Disease Brother         Christian--MI age 50.     Family History Negative Brother      Family History Negative Brother      Family History Negative Sister      Family History Negative Sister      Family History Negative Son      Family History Negative Daughter      Family History Negative Daughter             ROS:  Constitutional, HEENT, cardiovascular, pulmonary, gi and gu systems are negative, except as otherwise noted.    OBJECTIVE:     /80   Pulse (!) 47   Temp 97.2  F (36.2  C) (Tympanic)   Resp 18   SpO2 97%   There is no height or weight on file to calculate BMI.  GENERAL: healthy, alert and no distress  MS.SKIN: there is edema to bilateral extremities. Over the left great toe is increased ttp and erythema. There is no streaking up the leg. No discharge or blistering    Diagnostic Test Results:  See a/p    ASSESSMENT/PLAN:   1. Pain in both lower legs  2. Great toe pain, left  Unclear etiology for toe pain. COnsider gout today and will screen uric acid and trial antiinflammatory. If not improving or normal uric acid may consider empiric abx to cover cellulitis. I think some of the lower extremity pain is likely due to her lymphedema that she has not been compliant about wrapping. Encouraged this at length and the need to continue to work at getting on her feet and active.   - Uric acid  - predniSONE (DELTASONE) 20 MG tablet; Take 20 mg by mouth daily.  Dispense: 5 tablet; Refill: 0    3. Venous stasis dermatitis, unspecified laterality  Trial of below to help with scaling. This may decrease any avenues for infection  - triamcinolone (KENALOG) 0.1 % external cream; Apply topically 2 times daily  Dispense: 30 g; Refill: 1    Manoj Daley PA-C  [facility]

## 2019-05-16 NOTE — TELEPHONE ENCOUNTER
Patient c/o swelling both legs and Lt great toe red, painful and swollen. Symptoms have been going on for months per patient. Home care has been wrapping legs.    Lt hip replacement 2/12/2019 per history in chart.    Advised should be seen sooner by pcp as appt on 5/30.    Unable to come in today due to transportation issues. Made appointment to be seen tomorrow 5/17.    Ellie Coon RN

## 2019-05-17 ENCOUNTER — OFFICE VISIT (OUTPATIENT)
Dept: FAMILY MEDICINE | Facility: CLINIC | Age: 74
End: 2019-05-17
Payer: COMMERCIAL

## 2019-05-17 VITALS
HEART RATE: 47 BPM | RESPIRATION RATE: 18 BRPM | OXYGEN SATURATION: 97 % | TEMPERATURE: 97.2 F | SYSTOLIC BLOOD PRESSURE: 123 MMHG | DIASTOLIC BLOOD PRESSURE: 80 MMHG

## 2019-05-17 DIAGNOSIS — M79.675 GREAT TOE PAIN, LEFT: ICD-10-CM

## 2019-05-17 DIAGNOSIS — I87.2 VENOUS STASIS DERMATITIS, UNSPECIFIED LATERALITY: ICD-10-CM

## 2019-05-17 DIAGNOSIS — M79.662 PAIN IN BOTH LOWER LEGS: Primary | ICD-10-CM

## 2019-05-17 DIAGNOSIS — M79.661 PAIN IN BOTH LOWER LEGS: Primary | ICD-10-CM

## 2019-05-17 PROCEDURE — 84550 ASSAY OF BLOOD/URIC ACID: CPT | Performed by: PHYSICIAN ASSISTANT

## 2019-05-17 PROCEDURE — 36415 COLL VENOUS BLD VENIPUNCTURE: CPT | Performed by: PHYSICIAN ASSISTANT

## 2019-05-17 PROCEDURE — 99213 OFFICE O/P EST LOW 20 MIN: CPT | Performed by: PHYSICIAN ASSISTANT

## 2019-05-17 RX ORDER — TRIAMCINOLONE ACETONIDE 1 MG/G
CREAM TOPICAL 2 TIMES DAILY
Qty: 30 G | Refills: 1 | Status: SHIPPED | OUTPATIENT
Start: 2019-05-17 | End: 2019-08-30

## 2019-05-17 RX ORDER — PREDNISONE 20 MG/1
20 TABLET ORAL DAILY
Qty: 5 TABLET | Refills: 0 | Status: SHIPPED | OUTPATIENT
Start: 2019-05-17 | End: 2019-08-30

## 2019-05-17 ASSESSMENT — ANXIETY QUESTIONNAIRES
2. NOT BEING ABLE TO STOP OR CONTROL WORRYING: NOT AT ALL
1. FEELING NERVOUS, ANXIOUS, OR ON EDGE: NOT AT ALL
7. FEELING AFRAID AS IF SOMETHING AWFUL MIGHT HAPPEN: NOT AT ALL
5. BEING SO RESTLESS THAT IT IS HARD TO SIT STILL: NOT AT ALL
GAD7 TOTAL SCORE: 0
IF YOU CHECKED OFF ANY PROBLEMS ON THIS QUESTIONNAIRE, HOW DIFFICULT HAVE THESE PROBLEMS MADE IT FOR YOU TO DO YOUR WORK, TAKE CARE OF THINGS AT HOME, OR GET ALONG WITH OTHER PEOPLE: NOT DIFFICULT AT ALL
6. BECOMING EASILY ANNOYED OR IRRITABLE: NOT AT ALL
3. WORRYING TOO MUCH ABOUT DIFFERENT THINGS: NOT AT ALL

## 2019-05-17 ASSESSMENT — PATIENT HEALTH QUESTIONNAIRE - PHQ9
SUM OF ALL RESPONSES TO PHQ QUESTIONS 1-9: 0
5. POOR APPETITE OR OVEREATING: NOT AT ALL

## 2019-05-17 NOTE — LETTER
My Depression Action Plan  Name: Cristy Bailey   Date of Birth 1945  Date: 5/17/2019    My doctor: Manoj Daley   My clinic: [facility]  [address] [address] [phone]          GREEN    ZONE   Good Control    What it looks like:     Things are going generally well. You have normal up s and down s. You may even feel depressed from time to time, but bad moods usually last less than a day.   What you need to do:  1. Continue to care for yourself (see self care plan)  2. Check your depression survival kit and update it as needed  3. Follow your physician s recommendations including any medication.  4. Do not stop taking medication unless you consult with your physician first.           YELLOW         ZONE Getting Worse    What it looks like:     Depression is starting to interfere with your life.     It may be hard to get out of bed; you may be starting to isolate yourself from others.    Symptoms of depression are starting to last most all day and this has happened for several days.     You may have suicidal thoughts but they are not constant.   What you need to do:     1. Call your care team, your response to treatment will improve if you keep your care team informed of your progress. Yellow periods are signs an adjustment may need to be made.     2. Continue your self-care, even if you have to fake it!    3. Talk to someone in your support network    4. Open up your depression survival kit           RED    ZONE Medical Alert - Get Help    What it looks like:     Depression is seriously interfering with your life.     You may experience these or other symptoms: You can t get out of bed most days, can t work or engage in other necessary activities, you have trouble taking care of basic hygiene, or basic responsibilities, thoughts of suicide or death that will not go away, self-injurious behavior.     What you need to do:  1. Call your care team  and request a same-day appointment. If they are not available (weekends or after hours) call your local crisis line, emergency room or 911.            Depression Self Care Plan / Survival Kit    Self-Care for Depression  Here s the deal. Your body and mind are really not as separate as most people think.  What you do and think affects how you feel and how you feel influences what you do and think. This means if you do things that people who feel good do, it will help you feel better.  Sometimes this is all it takes.  There is also a place for medication and therapy depending on how severe your depression is, so be sure to consult with your medical provider and/ or Behavioral Health Consultant if your symptoms are worsening or not improving.     In order to better manage my stress, I will:    Exercise  Get some form of exercise, every day. This will help reduce pain and release endorphins, the  feel good  chemicals in your brain. This is almost as good as taking antidepressants!  This is not the same as joining a gym and then never going! (they count on that by the way ) It can be as simple as just going for a walk or doing some gardening, anything that will get you moving.      Hygiene   Maintain good hygiene (Get out of bed in the morning, Make your bed, Brush your teeth, Take a shower, and Get dressed like you were going to work, even if you are unemployed).  If your clothes don't fit try to get ones that do.    Diet  I will strive to eat foods that are good for me, drink plenty of water, and avoid excessive sugar, caffeine, alcohol, and other mood-altering substances.  Some foods that are helpful in depression are: complex carbohydrates, B vitamins, flaxseed, fish or fish oil, fresh fruits and vegetables.    Psychotherapy  I agree to participate in Individual Therapy (if recommended).    Medication  If prescribed medications, I agree to take them.  Missing doses can result in serious side effects.  I understand  that drinking alcohol, or other illicit drug use, may cause potential side effects.  I will not stop my medication abruptly without first discussing it with my provider.    Staying Connected With Others  I will stay in touch with my friends, family members, and my primary care provider/team.    Use your imagination  Be creative.  We all have a creative side; it doesn t matter if it s oil painting, sand castles, or mud pies! This will also kick up the endorphins.    Witness Beauty  (AKA stop and smell the roses) Take a look outside, even in mid-winter. Notice colors, textures. Watch the squirrels and birds.     Service to others  Be of service to others.  There is always someone else in need.  By helping others we can  get out of ourselves  and remember the really important things.  This also provides opportunities for practicing all the other parts of the program.    Humor  Laugh and be silly!  Adjust your TV habits for less news and crime-drama and more comedy.    Control your stress  Try breathing deep, massage therapy, biofeedback, and meditation. Find time to relax each day.     My support system    Clinic Contact:  Phone number:    Contact 1:  Phone number:    Contact 2:  Phone number:    Jewish/:  Phone number:    Therapist:  Phone number:    Local crisis center:    Phone number:    Other community support:  Phone number:

## 2019-05-18 LAB — URATE SERPL-MCNC: 4.5 MG/DL (ref 2.6–6)

## 2019-05-18 ASSESSMENT — ANXIETY QUESTIONNAIRES: GAD7 TOTAL SCORE: 0

## 2019-05-20 ENCOUNTER — ANTICOAGULATION THERAPY VISIT (OUTPATIENT)
Dept: NURSING | Facility: CLINIC | Age: 74
End: 2019-05-20
Payer: COMMERCIAL

## 2019-05-20 ENCOUNTER — DOCUMENTATION ONLY (OUTPATIENT)
Dept: FAMILY MEDICINE | Facility: CLINIC | Age: 74
End: 2019-05-20

## 2019-05-20 ENCOUNTER — TELEPHONE (OUTPATIENT)
Dept: FAMILY MEDICINE | Facility: CLINIC | Age: 74
End: 2019-05-20

## 2019-05-20 DIAGNOSIS — M79.675 GREAT TOE PAIN, LEFT: Primary | ICD-10-CM

## 2019-05-20 DIAGNOSIS — I82.402 ACUTE THROMBOEMBOLISM OF DEEP VEINS OF LEFT LOWER EXTREMITY (H): ICD-10-CM

## 2019-05-20 LAB — INR PPP: 3 (ref 0.8–1.1)

## 2019-05-20 RX ORDER — SULFAMETHOXAZOLE/TRIMETHOPRIM 800-160 MG
1 TABLET ORAL 2 TIMES DAILY
Qty: 14 TABLET | Refills: 0 | Status: SHIPPED | OUTPATIENT
Start: 2019-05-20 | End: 2019-08-30

## 2019-05-20 NOTE — PROGRESS NOTES
ANTICOAGULATION FOLLOW-UP CLINIC VISIT    Patient Name:  Cristy Bailey  Date:  5/20/2019  Contact Type:  Telephone    SUBJECTIVE:  Patient Findings     Positives:   Change in health (possible cellulitis, left great toe.), Change in medications (last day of prednisone)    Comments:   Patient thinks she missed last night's dose of warfarin. On prednisone.   States left great toe discolored (purple/red), warm to touch, more swollen and very painful - throbbing on top, and sore to touch. Nurse feels discoloration is an inch up the foot and extending to next toe as well. On last day of prednisone.  Per PCP's note, waiting to see how wraps work. Patient states she kept taking the wraps off so lymphedema therapy stopped coming. VS: stable Attempted to huddle with PCP but he is unavailable. Will route telephone encounter with observed symptoms of possible cellulitis to PCP for further advice.   Advised RN to dose warfarin at 2.5 mg daily, which is what patient was on before she went subtherapeutic after missed dose several weeks ago. Will recheck next INR in 1 week.   Keena CHAPMAN RN  Anticoagulation Clinic  [place]          Clinical Outcomes     Comments:   Patient thinks she missed last night's dose of warfarin. On prednisone.   States left great toe discolored (purple/red), warm to touch, more swollen and very painful - throbbing on top, and sore to touch. Nurse feels discoloration is an inch up the foot and extending to next toe as well. On last day of prednisone.  Per PCP's note, waiting to see how wraps work. Patient states she kept taking the wraps off so lymphedema therapy stopped coming. VS: stable Attempted to huddle with PCP but he is unavailable. Will route telephone encounter with observed symptoms of possible cellulitis to PCP for further advice.   Advised RN to dose warfarin at 2.5 mg daily, which is what patient was on before she went subtherapeutic after missed dose several weeks ago. Will recheck next  INR in 1 week.   Keena CHAPMAN RN  Anticoagulation Clinic  [place]             OBJECTIVE    INR   Date Value Ref Range Status   05/20/2019 3.0 (A) 0.8 - 1.1 Final       ASSESSMENT / PLAN  INR assessment THER    Recheck INR In: 10 DAYS    INR Location [facility] INR      Anticoagulation Summary  As of 5/20/2019    INR goal:   2.0-3.0   TTR:   62.3 % (2.2 mo)   INR used for dosing:   3.0 (5/20/2019)   Warfarin maintenance plan:   2.5 mg (2.5 mg x 1) every day   Full warfarin instructions:   2.5 mg every day   Weekly warfarin total:   17.5 mg   Plan last modified:   Keena Yo RN (5/20/2019)   Next INR check:   5/29/2019   Target end date:   6/6/2019    Indications    Acute thromboembolism of deep veins of left lower extremity (H) [I82.402]             Anticoagulation Episode Summary     INR check location:   Clinic Lab    Preferred lab:       Send INR reminders to:   [facility]    Comments:   2.5 mg tabs      Anticoagulation Care Providers     Provider Role Specialty Phone number    Manoj Daley PA-C Referring Physician Assistant [phone]            See the Encounter Report to view Anticoagulation Flowsheet and Dosing Calendar (Go to Encounters tab in chart review, and find the Anticoagulation Therapy Visit)    Dosage adjustment made based on physician directed care plan.    Keena Yo RN

## 2019-05-20 NOTE — TELEPHONE ENCOUNTER
See [facility] visit for warfarin dosing and next INR visit. Called [facility] RN back.    Kelley RN from  [facility] also states left great toe discolored (purple/red), warm to touch, more swollen and very painful - throbbing on top, and sore to touch. Nurse feels discoloration is an inch up the foot and extending to next toe as well. On last day of prednisone.  Per PCP's note, waiting to see how wraps work. Patient states she kept taking the wraps off so lymphedema therapy stopped coming. VS: stable Attempted to huddle with PCP but he is unavailable. Will route telephone encounter with observed symptoms of possible cellulitis to PCP for further advice.     Keena CHAPMAN RN  Anticoagulation Clinic  [place]

## 2019-05-20 NOTE — TELEPHONE ENCOUNTER
Called and left a detailed message for Kelley.      Called the pt to advise.  She says she can't come in now for an appt.

## 2019-05-20 NOTE — PROGRESS NOTES
Client saw you for her left great toe. She has been taking the prednisone. Has one day left. Reports the pain is worse. The toe now appears reddened and has purple bruised appearance in areas. Top of foot with more edema. Warm to touch. Could it be an infection from the toenail falling off last week? No fever or other complaints.

## 2019-05-20 NOTE — TELEPHONE ENCOUNTER
Also rec'd message from REMY Gooden. Let's try course of antibiotics. Sent to [facility]. Twice daily for 7 days. Needs follow up appt to assess improvement and will need more regular INR dosing given concomitant warfarin and abx.

## 2019-05-29 ENCOUNTER — ANTICOAGULATION THERAPY VISIT (OUTPATIENT)
Dept: NURSING | Facility: CLINIC | Age: 74
End: 2019-05-29
Payer: COMMERCIAL

## 2019-05-29 DIAGNOSIS — I82.402 ACUTE THROMBOEMBOLISM OF DEEP VEINS OF LEFT LOWER EXTREMITY (H): ICD-10-CM

## 2019-05-29 LAB — INR PPP: 2.6 (ref 0.8–1.1)

## 2019-05-29 PROCEDURE — 99207 ZZC NO CHARGE NURSE ONLY: CPT | Performed by: PHYSICIAN ASSISTANT

## 2019-05-29 NOTE — PROGRESS NOTES
ANTICOAGULATION FOLLOW-UP CLINIC VISIT    Patient Name:  Cristy Bailey  Date:  2019  Contact Type:  Face to Face    SUBJECTIVE:  Patient Findings     Positives:   Missed doses, Change in medications    Comments:   Kelley, from [facility] called ([phone]) and left a message that patient's INR today was 2.6, despite a missed dose last night. Also states patient was started on Bactrim DS for left great toe infection. She has 1 tab left. Toe is looking better.   Called Kelley back with dosing instructions and next INR check.   Keena CHAPMAN RN  Anticoagulation Clinic  [place]          Clinical Outcomes     Comments:   Kelley, from [facility] called ([phone]) and left a message that patient's INR today was 2.6, despite a missed dose last night. Also states patient was started on Bactrim DS for left great toe infection. She has 1 tab left. Toe is looking better.   Called Kelley back with dosing instructions and next INR check.   Keena CHAPMAN RN  Anticoagulation Clinic  [place]             OBJECTIVE    INR   Date Value Ref Range Status   2019 2.6 (A) 0.8 - 1.1 Final       ASSESSMENT / PLAN  INR assessment THER    Recheck INR In: 2 WEEKS    INR Location [facility]      Anticoagulation Summary  As of 2019    INR goal:   2.0-3.0   TTR:   66.9 % (2.5 mo)   INR used for dosin.6 (2019)   Warfarin maintenance plan:   2.5 mg (2.5 mg x 1) every day   Full warfarin instructions:   2.5 mg every day   Weekly warfarin total:   17.5 mg   No change documented:   Keena Yo RN   Plan last modified:   Keena Yo RN (2019)   Next INR check:   2019   Target end date:   2019    Indications    Acute thromboembolism of deep veins of left lower extremity (H) [I82.402]             Anticoagulation Episode Summary     INR check location:   Clinic Lab    Preferred lab:       Send INR reminders to:   [facility]    Comments:   2.5 mg tabs      Anticoagulation Care Providers     Provider Role  Specialty Phone number    Manoj Daley PA-C Referring Physician Assistant [phone]            See the Encounter Report to view Anticoagulation Flowsheet and Dosing Calendar (Go to Encounters tab in chart review, and find the Anticoagulation Therapy Visit)    Keena Yo RN

## 2019-05-31 ENCOUNTER — DOCUMENTATION ONLY (OUTPATIENT)
Dept: FAMILY MEDICINE | Facility: CLINIC | Age: 74
End: 2019-05-31

## 2019-05-31 DIAGNOSIS — I89.0 LYMPHEDEMA OF BOTH LOWER EXTREMITIES: ICD-10-CM

## 2019-05-31 DIAGNOSIS — M79.89 LEG SWELLING: Primary | ICD-10-CM

## 2019-05-31 NOTE — PROGRESS NOTES
[facility] now requests orders and shares plan of care/discharge summaries for some patients through [employer].  Please REPLY TO THIS MESSAGE OR ROUTE BACK TO THE AUTHOR in order to give authorization for orders when needed.  This is considered a verbal order, you will still receive a faxed copy of orders for signature.  Thank you for your assistance in improving collaboration for our patients.    ORDER Lymphedema Therapy 2m1 for fitting of velcro compression garments and caregiver training.

## 2019-06-03 ENCOUNTER — MYC REFILL (OUTPATIENT)
Dept: ORTHOPEDICS | Facility: CLINIC | Age: 74
End: 2019-06-03

## 2019-06-03 DIAGNOSIS — R29.898 WEAKNESS OF BOTH LOWER EXTREMITIES: ICD-10-CM

## 2019-06-03 DIAGNOSIS — M79.605 BILATERAL LEG PAIN: ICD-10-CM

## 2019-06-03 DIAGNOSIS — M79.604 BILATERAL LEG PAIN: ICD-10-CM

## 2019-06-05 RX ORDER — GABAPENTIN 300 MG/1
CAPSULE ORAL
Qty: 360 CAPSULE | Refills: 1 | Status: SHIPPED | OUTPATIENT
Start: 2019-06-05 | End: 2021-01-25

## 2019-06-05 NOTE — TELEPHONE ENCOUNTER
Routing refill request to provider for review/approval because:  Drug not on the [facility] refill protocol     Last Written Prescription Date:  5/2/19  Last Fill Quantity: 180,  # refills: 0  Last office visit: 4/17/2019 with prescribing provider:    Future Office Visit:      Linda Garrison RN Flex

## 2019-06-11 ENCOUNTER — TELEPHONE (OUTPATIENT)
Dept: FAMILY MEDICINE | Facility: CLINIC | Age: 74
End: 2019-06-11

## 2019-06-11 NOTE — PROGRESS NOTES
Received a call from Radha from OT.      She is looking for an order to be faxed to:  [facility] fax [phone], [phone] is the phone attention:  Sandra.  - Bilateral lower extremity velcro garments.

## 2019-06-12 ENCOUNTER — ANTICOAGULATION THERAPY VISIT (OUTPATIENT)
Dept: FAMILY MEDICINE | Facility: CLINIC | Age: 74
End: 2019-06-12
Payer: COMMERCIAL

## 2019-06-12 DIAGNOSIS — I82.402 ACUTE THROMBOEMBOLISM OF DEEP VEINS OF LEFT LOWER EXTREMITY (H): ICD-10-CM

## 2019-06-12 LAB — INR PPP: 1.4 (ref 0.8–1.1)

## 2019-06-12 PROCEDURE — 99207 ZZC NO CHARGE NURSE ONLY: CPT | Performed by: PHYSICIAN ASSISTANT

## 2019-06-12 NOTE — PROGRESS NOTES
ANTICOAGULATION FOLLOW-UP CLINIC VISIT    Patient Name:  Cristy Bailey  Date:  2019  Contact Type:  Telephone    SUBJECTIVE:  Patient Findings     Comments:   [facility] Kelley CHEW, called ([phone]) with patient's INR today at 1.4. Patient denies any identifiable changes that caused the subtherapeutic INR. The patient was assessed for diet, medication, and activity level changes, missed or extra doses, bruising or bleeding, with no problem findings. Asked again if any possibility of a missed dose and patient is adamant there were no missed doses. Nurse was unable to confirm because pill box was already filled for upcoming week. Per protocol, increased dose today and changed maintenance dose to reflect a 15% increase as well. Will recheck INR in 1 week.  Keena CHAPMAN RN  Anticoagulation Clinic  [place]              Clinical Outcomes     Comments:   [facility] Kelley CHEW, called ([phone]) with patient's INR today at 1.4. Patient denies any identifiable changes that caused the subtherapeutic INR. The patient was assessed for diet, medication, and activity level changes, missed or extra doses, bruising or bleeding, with no problem findings. Asked again if any possibility of a missed dose and patient is adamant there were no missed doses. Nurse was unable to confirm because pill box was already filled for upcoming week. Per protocol, increased dose today and changed maintenance dose to reflect a 15% increase as well. Will recheck INR in 1 week.  Keena CHAPMAN RN  Anticoagulation Clinic  [place]                 OBJECTIVE    INR   Date Value Ref Range Status   2019 1.4 (A) 0.8 - 1.1 Final       ASSESSMENT / PLAN  INR assessment SUB    Recheck INR In: 1 WEEK    INR Location [facility]      Anticoagulation Summary  As of 2019    INR goal:   2.0-3.0   TTR:   64.2 % (2.9 mo)   INR used for dosin.4! (2019)   Warfarin maintenance plan:   5 mg (2.5 mg x 2) every Wed; 2.5 mg (2.5 mg x 1) all other days    Full warfarin instructions:   6/12: 5 mg; Otherwise 5 mg every Wed; 2.5 mg all other days   Weekly warfarin total:   20 mg   Plan last modified:   Keena Yo RN (6/12/2019)   Next INR check:   6/19/2019   Target end date:   6/6/2019    Indications    Acute thromboembolism of deep veins of left lower extremity (H) [I82.402]             Anticoagulation Episode Summary     INR check location:   Clinic Lab    Preferred lab:       Send INR reminders to:   [facility]    Comments:   2.5 mg tabs      Anticoagulation Care Providers     Provider Role Specialty Phone number    Manoj Daley PA-C Referring Physician Assistant [phone]            See the Encounter Report to view Anticoagulation Flowsheet and Dosing Calendar (Go to Encounters tab in chart review, and find the Anticoagulation Therapy Visit)    Dosage adjustment made based on physician directed care plan.    Keena Yo, RN

## 2019-06-19 ENCOUNTER — ANTICOAGULATION THERAPY VISIT (OUTPATIENT)
Dept: NURSING | Facility: CLINIC | Age: 74
End: 2019-06-19
Payer: COMMERCIAL

## 2019-06-19 DIAGNOSIS — I82.402 ACUTE THROMBOEMBOLISM OF DEEP VEINS OF LEFT LOWER EXTREMITY (H): ICD-10-CM

## 2019-06-19 LAB — INR PPP: 1.9 (ref 0.8–1.1)

## 2019-06-19 PROCEDURE — 99207 ZZC NO CHARGE NURSE ONLY: CPT | Performed by: PHYSICIAN ASSISTANT

## 2019-06-19 NOTE — PROGRESS NOTES
ANTICOAGULATION FOLLOW-UP CLINIC VISIT    Patient Name:  Cristy Bailey  Date:  2019  Contact Type:  Face to Face    SUBJECTIVE:  Patient Findings     Positives:   Missed doses    Comments:   Kelley called and left VM that patient's INR today was 1.9 and the patient thinks she missed a dose on either  or Monday.  Called Kelley back: Will talk to patient about being more regular with filling pill box. Advised per protocol to increase today's dose to 7.5 mg (from 5 mg), all other days are 2.5 before checking again next Wednesday (which will be a 5 mg dose if therapeutic next week). Kelley stated understanding and is in agreement with the plan.    Keena CHAPMAN RN  Anticoagulation Clinic  [place]          Clinical Outcomes     Comments:   Kelley called and left VM that patient's INR today was 1.9 and the patient thinks she missed a dose on either  or Monday.  Called Kelley back: Will talk to patient about being more regular with filling pill box. Advised per protocol to increase today's dose to 7.5 mg (from 5 mg), all other days are 2.5 before checking again next Wednesday (which will be a 5 mg dose if therapeutic next week). Kelley stated understanding and is in agreement with the plan.    Keena CHAPMAN RN  Anticoagulation Clinic  [place]             OBJECTIVE    INR   Date Value Ref Range Status   2019 1.9 (A) 0.8 - 1.1 Final       ASSESSMENT / PLAN  INR assessment SUB    Recheck INR In: 1 WEEK    INR Location [facility]      Anticoagulation Summary  As of 2019    INR goal:   2.0-3.0   TTR:   59.5 % (3.2 mo)   INR used for dosin.9! (2019)   Warfarin maintenance plan:   5 mg (2.5 mg x 2) every Wed; 2.5 mg (2.5 mg x 1) all other days   Full warfarin instructions:   : 7.5 mg; Otherwise 5 mg every Wed; 2.5 mg all other days   Weekly warfarin total:   20 mg   Plan last modified:   Keena Yo RN (2019)   Next INR check:   2019   Target end date:   2019    Indications     Acute thromboembolism of deep veins of left lower extremity (H) [I82.402]             Anticoagulation Episode Summary     INR check location:   Clinic Lab    Preferred lab:       Send INR reminders to:   [facility]    Comments:   2.5 mg tabs      Anticoagulation Care Providers     Provider Role Specialty Phone number    Manoj Daley PA-C Referring Physician Assistant [phone]            See the Encounter Report to view Anticoagulation Flowsheet and Dosing Calendar (Go to Encounters tab in chart review, and find the Anticoagulation Therapy Visit)    Dosage adjustment made based on physician directed care plan.    Keena Yo RN

## 2019-06-26 ENCOUNTER — ANTICOAGULATION THERAPY VISIT (OUTPATIENT)
Dept: NURSING | Facility: CLINIC | Age: 74
End: 2019-06-26
Payer: COMMERCIAL

## 2019-06-26 DIAGNOSIS — Z79.01 LONG TERM CURRENT USE OF ANTICOAGULANT THERAPY: ICD-10-CM

## 2019-06-26 DIAGNOSIS — I82.402 ACUTE THROMBOEMBOLISM OF DEEP VEINS OF LEFT LOWER EXTREMITY (H): ICD-10-CM

## 2019-06-26 LAB — INR PPP: 1.4 (ref 0.8–1.1)

## 2019-06-26 PROCEDURE — 99207 ZZC NO CHARGE NURSE ONLY: CPT | Performed by: PHYSICIAN ASSISTANT

## 2019-06-26 RX ORDER — WARFARIN SODIUM 2.5 MG/1
TABLET ORAL
Qty: 120 TABLET | Refills: 0 | Status: SHIPPED | OUTPATIENT
Start: 2019-06-26 | End: 2021-01-25

## 2019-06-27 NOTE — PROGRESS NOTES
"ANTICOAGULATION FOLLOW-UP CLINIC VISIT    Patient Name:  Cristy Bailey  Date:  6/27/2019  Contact Type:  Telephone    SUBJECTIVE:  Patient Findings     Positives:   Missed doses    Comments:   RN from [facility] called and left vm that patient's INR today is 1.4 and states \"last week instead of 3 on Wednesday, she did 2, then 1 tab every day\". RN will be going back out to patient's home again tomorrow.   Called Kelley back: She is unsure that patient actually increased her dose last week. Besides not following dosing directions, the patient was assessed for diet, medication, and activity level changes, missed or extra doses, bruising or bleeding, with no problem findings. Per protocol, advised to give boost in dose tonight and will also increase maintenance dose slightly this is an ongoing problem, and recheck in 1 week. RN stated understanding and is in agreement with the plan.  Keena CHAPMAN RN  Anticoagulation Clinic  [place]            Clinical Outcomes     Comments:   RN from [facility] called and left vm that patient's INR today is 1.4 and states \"last week instead of 3 on Wednesday, she did 2, then 1 tab every day\". RN will be going back out to patient's home again tomorrow.   Called Kelley back: She is unsure that patient actually increased her dose last week. Besides not following dosing directions, the patient was assessed for diet, medication, and activity level changes, missed or extra doses, bruising or bleeding, with no problem findings. Per protocol, advised to give boost in dose tonight and will also increase maintenance dose slightly this is an ongoing problem, and recheck in 1 week. RN stated understanding and is in agreement with the plan.  Keena CHAPMAN RN  Anticoagulation Clinic  [place]               OBJECTIVE    INR   Date Value Ref Range Status   06/26/2019 1.4 (A) 0.8 - 1.1 Final       ASSESSMENT / PLAN  INR assessment SUB    Recheck INR In: 1 WEEK    INR Location [facility]      Anticoagulation Summary  " As of 2019    INR goal:   2.0-3.0   TTR:   55.4 % (3.4 mo)   INR used for dosin.4! (2019)   Warfarin maintenance plan:   5 mg (2.5 mg x 2) every Mon, Fri; 2.5 mg (2.5 mg x 1) all other days   Full warfarin instructions:   : 7.5 mg; Otherwise 5 mg every Mon, Fri; 2.5 mg all other days   Weekly warfarin total:   22.5 mg   Plan last modified:   Keena Yo RN (2019)   Next INR check:   7/3/2019   Target end date:   2019    Indications    Acute thromboembolism of deep veins of left lower extremity (H) [I82.402]             Anticoagulation Episode Summary     INR check location:   Clinic Lab    Preferred lab:       Send INR reminders to:   [facility]    Comments:   2.5 mg tabs      Anticoagulation Care Providers     Provider Role Specialty Phone number    Manoj Daley PA-C Referring Physician Assistant [phone]            See the Encounter Report to view Anticoagulation Flowsheet and Dosing Calendar (Go to Encounters tab in chart review, and find the Anticoagulation Therapy Visit)    Dosage adjustment made based on physician directed care plan.    Keena Yo, JEEVAN

## 2019-07-01 ENCOUNTER — DOCUMENTATION ONLY (OUTPATIENT)
Dept: FAMILY MEDICINE | Facility: CLINIC | Age: 74
End: 2019-07-01

## 2019-07-01 NOTE — PROGRESS NOTES
[facility] now requests orders and shares plan of care/discharge summaries for some patients through [employer].  Please REPLY TO THIS MESSAGE OR ROUTE BACK TO THE AUTHOR in order to give authorization for orders when needed.  This is considered a verbal order, you will still receive a faxed copy of orders for signature.  Thank you for your assistance in improving collaboration for our patients.    ORDER  [facility] 2 x month x 2, 4 prn        Ongoing home care for INRs, med teaching, set up, assessment of safety, med compliance, health status

## 2019-07-03 ENCOUNTER — ANTICOAGULATION THERAPY VISIT (OUTPATIENT)
Dept: NURSING | Facility: CLINIC | Age: 74
End: 2019-07-03
Payer: COMMERCIAL

## 2019-07-03 DIAGNOSIS — I82.402 ACUTE THROMBOEMBOLISM OF DEEP VEINS OF LEFT LOWER EXTREMITY (H): ICD-10-CM

## 2019-07-03 LAB — INR PPP: 2.4 (ref 0.8–1.1)

## 2019-07-03 PROCEDURE — 99207 ZZC NO CHARGE NURSE ONLY: CPT | Performed by: PHYSICIAN ASSISTANT

## 2019-07-03 NOTE — PROGRESS NOTES
ANTICOAGULATION FOLLOW-UP CLINIC VISIT    Patient Name:  Cristy Bailey  Date:  7/3/2019  Contact Type:  Telephone    SUBJECTIVE:  Patient Findings     Comments:   Kelley calling from [facility] ([phone]): Patient's INR today is 2.4. Will continue with new maintenance dose. The patient was assessed for diet, medication, and activity level changes, missed or extra doses, bruising or bleeding, with no problem findings. However, patient is complaining of some foot neuropathy. Homecare nurse will discuss with PCP.   Keena CHAPMAN RN  Anticoagulation Clinic  [place]          Clinical Outcomes     Comments:   Kelley calling from [facility] ([phone]): Patient's INR today is 2.4. Will continue with new maintenance dose. The patient was assessed for diet, medication, and activity level changes, missed or extra doses, bruising or bleeding, with no problem findings. However, patient is complaining of some foot neuropathy. Homecare nurse will discuss with PCP.   Keena CHAPMAN RN  Anticoagulation Clinic  [place]             OBJECTIVE    INR   Date Value Ref Range Status   2019 2.4 (A) 0.8 - 1.1 Final       ASSESSMENT / PLAN  INR assessment THER    Recheck INR In: 1 WEEK    INR Location [facility]      Anticoagulation Summary  As of 7/3/2019    INR goal:   2.0-3.0   TTR:   54.4 % (3.6 mo)   INR used for dosin.4 (7/3/2019)   Warfarin maintenance plan:   5 mg (2.5 mg x 2) every Mon, Fri; 2.5 mg (2.5 mg x 1) all other days   Full warfarin instructions:   5 mg every Mon, Fri; 2.5 mg all other days   Weekly warfarin total:   22.5 mg   No change documented:   Keena Yo RN   Plan last modified:   Keena Yo RN (2019)   Next INR check:   7/10/2019   Target end date:   2019    Indications    Acute thromboembolism of deep veins of left lower extremity (H) [I82.402]             Anticoagulation Episode Summary     INR check location:   Clinic Lab    Preferred lab:       Send INR reminders to:   [facility]  [facility]    Comments:   2.5 mg tabs      Anticoagulation Care Providers     Provider Role Specialty Phone number    Manoj Daley PA-C Referring Physician Assistant [phone]            See the Encounter Report to view Anticoagulation Flowsheet and Dosing Calendar (Go to Encounters tab in chart review, and find the Anticoagulation Therapy Visit)    Keena Yo RN

## 2019-07-10 ENCOUNTER — ANTICOAGULATION THERAPY VISIT (OUTPATIENT)
Dept: NURSING | Facility: CLINIC | Age: 74
End: 2019-07-10
Payer: COMMERCIAL

## 2019-07-10 DIAGNOSIS — I82.402 ACUTE THROMBOEMBOLISM OF DEEP VEINS OF LEFT LOWER EXTREMITY (H): ICD-10-CM

## 2019-07-10 LAB — INR PPP: 2.5 (ref 0.8–1.1)

## 2019-07-10 PROCEDURE — 99207 ZZC NO CHARGE NURSE ONLY: CPT | Performed by: PHYSICIAN ASSISTANT

## 2019-07-10 NOTE — PROGRESS NOTES
ANTICOAGULATION FOLLOW-UP CLINIC VISIT    Patient Name:  Cristy Bailey  Date:  7/10/2019  Contact Type:  Telephone    SUBJECTIVE:  Patient Findings     Comments:   Jennifer from [facility] ([phone]) called today with patient's INR 2.5. The patient was assessed for diet, medication, and activity level changes, missed or extra doses, bruising or bleeding, with no problem findings.  Keena CHAPMAN RN  Anticoagulation Clinic  [place]          Clinical Outcomes     Negatives:   Major bleeding event, Thromboembolic event, Anticoagulation-related hospital admission, Anticoagulation-related ED visit, Anticoagulation-related fatality    Comments:   Jennifer from [facility] ([phone]) called today with patient's INR 2.5. The patient was assessed for diet, medication, and activity level changes, missed or extra doses, bruising or bleeding, with no problem findings.  Keena CHAPMAN RN  Anticoagulation Clinic  [place]             OBJECTIVE    INR   Date Value Ref Range Status   07/10/2019 2.5 (A) 0.8 - 1.1 Final       ASSESSMENT / PLAN  INR assessment THER    Recheck INR In: 2 WEEKS    INR Location [facility]      Anticoagulation Summary  As of 7/10/2019    INR goal:   2.0-3.0   TTR:   57.2 % (3.9 mo)   INR used for dosin.5 (7/10/2019)   Warfarin maintenance plan:   5 mg (2.5 mg x 2) every Mon, Fri; 2.5 mg (2.5 mg x 1) all other days   Full warfarin instructions:   5 mg every Mon, Fri; 2.5 mg all other days   Weekly warfarin total:   22.5 mg   No change documented:   Keena Yo RN   Plan last modified:   Keena Yo RN (2019)   Next INR check:   2019   Target end date:   2019    Indications    Acute thromboembolism of deep veins of left lower extremity (H) [I82.402]             Anticoagulation Episode Summary     INR check location:   Clinic Lab    Preferred lab:       Send INR reminders to:   [facility]    Comments:   2.5 mg tabs      Anticoagulation Care Providers     Provider Role Specialty  Phone number    Manoj Daley PA-C Referring Physician Assistant [phone]            See the Encounter Report to view Anticoagulation Flowsheet and Dosing Calendar (Go to Encounters tab in chart review, and find the Anticoagulation Therapy Visit)    Keena Yo RN

## 2019-07-12 ENCOUNTER — TELEPHONE (OUTPATIENT)
Dept: FAMILY MEDICINE | Facility: CLINIC | Age: 74
End: 2019-07-12

## 2019-07-12 NOTE — TELEPHONE ENCOUNTER
Received Home Health Certification and Plan of Care, placed in Kulwant's in basket.    Please review, sign and fax back to [phone].

## 2019-07-15 DIAGNOSIS — F43.23 ADJUSTMENT DISORDER WITH MIXED ANXIETY AND DEPRESSED MOOD: ICD-10-CM

## 2019-07-15 NOTE — TELEPHONE ENCOUNTER
"Requested Prescriptions   Pending Prescriptions Disp Refills     citalopram (CELEXA) 20 MG tablet [Pharmacy Med Name: Citalopram Hydrobromide Oral Tablet 20 MG]  Last Written Prescription Date:  historical  Last Fill Quantity: na,  # refills: na   Last office visit: 5/17/2019 with prescribing provider:  Manoj Daley PA-C    Future Office Visit:     90 tablet 0     Sig: TAKE ONE TABLET BY MOUTH ONE TIME DAILY       SSRIs Protocol Passed - 7/15/2019  7:01 AM        Passed - PHQ-9 score less than 5 in past 6 months     Please review last PHQ-9 score.   PHQ-9 SCORE 5/17/2019   PHQ-9 Total Score 0     GINO-7 SCORE 5/17/2019   Total Score 0                 Passed - Medication is active on med list        Passed - Patient is age 18 or older        Passed - No active pregnancy on record        Passed - No positive pregnancy test in last 12 months        Passed - Recent (6 mo) or future (30 days) visit within the authorizing provider's specialty     Patient had office visit in the last 6 months or has a visit in the next 30 days with authorizing provider or within the authorizing provider's specialty.  See \"Patient Info\" tab in inbasket, or \"Choose Columns\" in Meds & Orders section of the refill encounter.              "

## 2019-07-16 DIAGNOSIS — Z53.9 DIAGNOSIS NOT YET DEFINED: Primary | ICD-10-CM

## 2019-07-16 PROCEDURE — G0179 MD RECERTIFICATION HHA PT: HCPCS | Performed by: FAMILY MEDICINE

## 2019-07-19 RX ORDER — CITALOPRAM HYDROBROMIDE 20 MG/1
TABLET ORAL
Qty: 90 TABLET | Refills: 0 | Status: SHIPPED | OUTPATIENT
Start: 2019-07-19 | End: 2021-01-25

## 2019-07-19 NOTE — TELEPHONE ENCOUNTER
Celexa  Routing refill request to provider for review/approval because:  Medication is reported/historical    Miriam August, RN, BSN

## 2019-07-24 ENCOUNTER — ANTICOAGULATION THERAPY VISIT (OUTPATIENT)
Dept: NURSING | Facility: CLINIC | Age: 74
End: 2019-07-24
Payer: COMMERCIAL

## 2019-07-24 DIAGNOSIS — I82.402 ACUTE THROMBOEMBOLISM OF DEEP VEINS OF LEFT LOWER EXTREMITY (H): ICD-10-CM

## 2019-07-24 LAB — INR PPP: 3 (ref 0.8–1.1)

## 2019-07-24 PROCEDURE — 99207 ZZC NO CHARGE NURSE ONLY: CPT | Performed by: PHYSICIAN ASSISTANT

## 2019-07-24 NOTE — PROGRESS NOTES
ANTICOAGULATION FOLLOW-UP CLINIC VISIT    Patient Name:  Cristy Bailey  Date:  7/24/2019  Contact Type:  Telephone    SUBJECTIVE:  Patient Findings     Comments:   Kelley called from [facility] with patient's INR today at 3.0. The patient was assessed for diet, medication, and activity level changes, missed or extra doses, bruising or bleeding, with no problem findings.  Keena CHAPMAN RN  Anticoagulation Clinic  [place]          Clinical Outcomes     Negatives:   Major bleeding event, Thromboembolic event, Anticoagulation-related hospital admission, Anticoagulation-related ED visit, Anticoagulation-related fatality    Comments:   Kelley called from [facility] with patient's INR today at 3.0. The patient was assessed for diet, medication, and activity level changes, missed or extra doses, bruising or bleeding, with no problem findings.  Keena CHAPMAN RN  Anticoagulation Clinic  [place]             OBJECTIVE    INR   Date Value Ref Range Status   07/24/2019 3.0 (A) 0.8 - 1.1 Final       ASSESSMENT / PLAN  INR assessment THER    Recheck INR In: 2 WEEKS    INR Location [facility]      Anticoagulation Summary  As of 7/24/2019    INR goal:   2.0-3.0   TTR:   61.8 % (4.3 mo)   INR used for dosing:   3.0 (7/24/2019)   Warfarin maintenance plan:   5 mg (2.5 mg x 2) every Mon, Fri; 2.5 mg (2.5 mg x 1) all other days   Full warfarin instructions:   5 mg every Mon, Fri; 2.5 mg all other days   Weekly warfarin total:   22.5 mg   No change documented:   Keena Yo RN   Plan last modified:   Keena Yo RN (6/26/2019)   Next INR check:   8/7/2019   Target end date:   6/6/2019    Indications    Acute thromboembolism of deep veins of left lower extremity (H) [I82.402]             Anticoagulation Episode Summary     INR check location:   Clinic Lab    Preferred lab:       Send INR reminders to:   [facility]    Comments:   2.5 mg tabs      Anticoagulation Care Providers     Provider Role Specialty Phone number    Edi  Manoj Jones PA-C Referring Physician Assistant [phone]            See the Encounter Report to view Anticoagulation Flowsheet and Dosing Calendar (Go to Encounters tab in chart review, and find the Anticoagulation Therapy Visit)    Keena Yo RN

## 2019-08-07 ENCOUNTER — ANTICOAGULATION THERAPY VISIT (OUTPATIENT)
Dept: NURSING | Facility: CLINIC | Age: 74
End: 2019-08-07
Payer: COMMERCIAL

## 2019-08-07 DIAGNOSIS — Z79.01 LONG TERM CURRENT USE OF ANTICOAGULANT THERAPY: ICD-10-CM

## 2019-08-07 DIAGNOSIS — I82.402 ACUTE THROMBOEMBOLISM OF DEEP VEINS OF LEFT LOWER EXTREMITY (H): ICD-10-CM

## 2019-08-07 LAB — INR PPP: 2.9 (ref 0.8–1.1)

## 2019-08-07 PROCEDURE — 99207 ZZC NO CHARGE NURSE ONLY: CPT | Performed by: PHYSICIAN ASSISTANT

## 2019-08-07 NOTE — Clinical Note
[facility] will call you with this patient's results as I am out of clinic that week.

## 2019-08-07 NOTE — PROGRESS NOTES
ANTICOAGULATION FOLLOW-UP CLINIC VISIT    Patient Name:  Cristy Bailey  Date:  2019  Contact Type:  Telephone    SUBJECTIVE:  Patient Findings     Comments:   JEEVAN Benson with [facility] calling with today's INR at 2.9. The patient was assessed for diet, medication, and activity level changes, missed or extra doses, bruising or bleeding, with no problem findings.  Advised Kelley to call [facility] with next INR on 19 while this INR nurse is out of  Clinic. Kelley stated understanding and is in agreement with plan.    Keena CHAPMAN RN  Anticoagulation Clinic  [place]            Clinical Outcomes     Negatives:   Major bleeding event, Thromboembolic event, Anticoagulation-related hospital admission, Anticoagulation-related ED visit, Anticoagulation-related fatality    Comments:   JEEVAN Benson with [facility] calling with today's INR at 2.9. The patient was assessed for diet, medication, and activity level changes, missed or extra doses, bruising or bleeding, with no problem findings.  Advised Kelley to call [facility] with next INR on 19 while this INR nurse is out of  Clinic. Kelley stated understanding and is in agreement with plan.    Keena CHAPMAN RN  Anticoagulation Clinic  [place]               OBJECTIVE    INR   Date Value Ref Range Status   2019 2.9 (A) 0.8 - 1.1 Final       ASSESSMENT / PLAN  INR assessment THER    Recheck INR In: 2 WEEKS    INR Location [facility]      Anticoagulation Summary  As of 2019    INR goal:   2.0-3.0   TTR:   65.5 % (4.8 mo)   INR used for dosin.9 (2019)   Warfarin maintenance plan:   5 mg (2.5 mg x 2) every Mon, Fri; 2.5 mg (2.5 mg x 1) all other days   Full warfarin instructions:   5 mg every Mon, Fri; 2.5 mg all other days   Weekly warfarin total:   22.5 mg   No change documented:   Keena Yo RN   Plan last modified:   Keena Yo RN (2019)   Next INR check:   2019   Target end date:   2019    Indications     Acute thromboembolism of deep veins of left lower extremity (H) [I82.402]             Anticoagulation Episode Summary     INR check location:   Clinic Lab    Preferred lab:       Send INR reminders to:   [facility] DIE[facility]    Comments:   2.5 mg tabs      Anticoagulation Care Providers     Provider Role Specialty Phone number    Manoj Daley PA-C Referring Physician Assistant [phone]            See the Encounter Report to view Anticoagulation Flowsheet and Dosing Calendar (Go to Encounters tab in chart review, and find the Anticoagulation Therapy Visit)    Keena Yo RN

## 2019-08-08 PROBLEM — Z79.01 LONG TERM CURRENT USE OF ANTICOAGULANT THERAPY: Status: ACTIVE | Noted: 2019-08-08

## 2019-08-22 ENCOUNTER — ANTICOAGULATION THERAPY VISIT (OUTPATIENT)
Dept: FAMILY MEDICINE | Facility: CLINIC | Age: 74
End: 2019-08-22
Payer: COMMERCIAL

## 2019-08-22 DIAGNOSIS — Z79.01 LONG TERM CURRENT USE OF ANTICOAGULANT THERAPY: Primary | ICD-10-CM

## 2019-08-22 DIAGNOSIS — I82.402 ACUTE THROMBOEMBOLISM OF DEEP VEINS OF LEFT LOWER EXTREMITY (H): ICD-10-CM

## 2019-08-22 LAB — INR PPP: 2.6 (ref 0.9–1.1)

## 2019-08-22 PROCEDURE — 99207 ZZC NO CHARGE NURSE ONLY: CPT | Performed by: PHYSICIAN ASSISTANT

## 2019-08-22 NOTE — PROGRESS NOTES
ANTICOAGULATION FOLLOW-UP     Patient Name:  Cristy Bailey  Date:  2019  Contact Type:  Telephone  Call received from Latrice ([phone]), [facility] nurse, with INR result.   Follow up instructions given over the phone to Home Care nurse for warfarin management.    SUBJECTIVE:  Patient Findings     Comments:   Per Homecare nurse:  No bleeding or concerning bruises  No medication changes   No change in diet or eating habits     INR is therapeutic today.   Patient will continue same maintenance dose.   Follow up in 2 weeks.          Clinical Outcomes     Comments:   Per Homecare nurse:  No bleeding or concerning bruises  No medication changes   No change in diet or eating habits     INR is therapeutic today.   Patient will continue same maintenance dose.   Follow up in 2 weeks.             OBJECTIVE    INR   Date Value Ref Range Status   2019 2.6 (A) 0.9 - 1.1 Final       ASSESSMENT / PLAN  INR assessment THER    Recheck INR In: 2 WEEKS    INR Location [facility]R      Anticoagulation Summary  As of 2019    INR goal:   2.0-3.0   TTR:   68.8 % (5.3 mo)   INR used for dosin.6 (2019)   Warfarin maintenance plan:   5 mg (2.5 mg x 2) every Mon, Fri; 2.5 mg (2.5 mg x 1) all other days   Full warfarin instructions:   5 mg every Mon, Fri; 2.5 mg all other days   Weekly warfarin total:   22.5 mg   No change documented:   Arielle Freitas RN   Plan last modified:   Keena Yo RN (2019)   Next INR check:   2019   Target end date:   2019    Indications    Acute thromboembolism of deep veins of left lower extremity (H) [I82.402]  Long term current use of anticoagulant therapy [Z79.01]             Anticoagulation Episode Summary     INR check location:   Home Draw    Preferred lab:       Send INR reminders to:   [facility] DIE[facility]    Comments:   2.5 mg tabs      Anticoagulation Care Providers     Provider Role Specialty Phone number    Manoj Daley PA-C Referring  Physician Assistant [phone]            See the Encounter Report to view Anticoagulation Flowsheet and Dosing Calendar (Go to Encounters tab in chart review, and find the Anticoagulation Therapy Visit)    INR is therapeutic today.   Patient will continue same maintenance dose.   Follow up in 2 weeks.        Arielle Freitas RN

## 2019-08-23 ENCOUNTER — MYC MEDICAL ADVICE (OUTPATIENT)
Dept: FAMILY MEDICINE | Facility: CLINIC | Age: 74
End: 2019-08-23

## 2019-08-26 ENCOUNTER — TELEPHONE (OUTPATIENT)
Dept: FAMILY MEDICINE | Facility: CLINIC | Age: 74
End: 2019-08-26

## 2019-08-26 NOTE — TELEPHONE ENCOUNTER
"Called patient to inform, she stated that she cannot get in for an appt. I told her this may not be able to be taken care of without an appt.     Pt stated \" I am sorry I asked\" and hung up the phone\".  Kiera Appiah MA     "

## 2019-08-26 NOTE — TELEPHONE ENCOUNTER
Reason for Call:  Home Health Care    Inocente with [facility] called regarding (reason for call):     Orders are needed for this patient.     PT:     OT:     Skilled Nursing:   Twice a month for 2 months  2 PRN    Pt Provider:     Phone Number Homecare Nurse can be reached at: [phone]    Can we leave a detailed message on this number? YES    Phone number patient can be reached at:     Best Time: any    Call taken on 8/26/2019 at 4:17 PM by Francisca Salazar

## 2019-08-27 NOTE — TELEPHONE ENCOUNTER
Called Kelley.    She said it is for INR checks and teaching of the coumadin dosing.      She is doing tubigrips for the swelling and velcro compression garments.  It is not for the lymphedema.      She said the pt says she can't come for an appt, but she thinks she would be able to.  She walks around the house a little.

## 2019-08-27 NOTE — TELEPHONE ENCOUNTER
Please follow up with Cristy.   There is a good chance we can get her off of the coumadin and stop the repeat blood checks but we'll need to do imaging and discuss. Does she need help with transportation? When does she think she could get in?

## 2019-08-27 NOTE — TELEPHONE ENCOUNTER
Called Kelley and gave verbal ok.      She said she will keep encouraging the pt to make an appt.

## 2019-08-29 NOTE — PROGRESS NOTES
"Subjective     Cristy Bailey is a 74 year old female who presents to clinic today for the following health issues:    HPI   Patient is here to follow up on medication for DVT hx following surgery  She sustained this in TCU following left hip replacement  Jenifer asked her back to discuss possibility of getting off of the coumadin  She has no other risk factors  She will need follow up ultrasound  She has had home nursing come out and doing her INR checks. Overall stable with some occasional supratherapeutic levels.     Amazingly she tells me today her leg pain has improved  It is not near the level of pain she was previously    Unfortunately she is also having some persistent great toe pain  -We saw her for a similar pain in May; this improved for a time.  -now its painful any time it's bumped or touched; \"looks normal except the nail fell off a while ago and is now grown back\"  -she uses compression stockings regularly  -there is no obvious redness or warmth      Patient Active Problem List   Diagnosis     HTN (hypertension)     Hyperlipidemia LDL goal <160     Abnormal glucose     Obesity     Leg weakness, bilateral     CRP elevated     Vitamin D deficiency     Health Care Home     HTN, goal below 140/90     Constipation     Leg weakness     Constipation     Myalgia and myositis     Morbid obesity (H)     Avascular necrosis of bone of hip, right (H)     Hip osteoarthritis     Status post THR (total hip replacement)     Acute thromboembolism of deep veins of left lower extremity (H)     Long term current use of anticoagulant therapy     Past Surgical History:   Procedure Laterality Date     ARTHROPLASTY HIP Right 7/30/2018    Procedure: ARTHROPLASTY HIP;  Right total hip arthroplasty;  Surgeon: Bry Garcia MD;  Location: [facility] OR     ARTHROPLASTY HIP Left 2/12/2019    Procedure: Left total hip arthroplasty (Akbar [employer]hew 60 mm acetabulum with 2 screws; #15 standard neck Synergy stem; +0 neck 36 mm head) " "(extra difficult case because of her high BMI and deep subcutaneous fatty layer resulting in the operative time at least twice as long from typical operative time);  Surgeon: Chavo Rodriguez MD;  Location: [facility] OR     BIOPSY OF UTERUS LINING  3/2010    negative.      C C-SEC ONLY,PREV C-SEC      c-sec x 3      COLONOSCOPY  2/21/10    benign findings. advised 10 yr f/u.      INCISION AND DRAINAGE HIP, COMBINED Right 2018    Procedure: COMBINED INCISION AND DRAINAGE HIP;  Incision and debridement, right hip ;  Surgeon: Bry Garcia MD;  Location: [facility] OR     ORTHOPEDIC SURGERY Right 2018    Right hip I & D 18, Right JENAE, DOS 2018, Dr. Garcia. [facility]       Social History     Tobacco Use     Smoking status: Former Smoker     Last attempt to quit: 1984     Years since quittin.1     Smokeless tobacco: Never Used   Substance Use Topics     Alcohol use: No     Family History   Problem Relation Age of Onset     Cerebrovascular Disease Father         -stroke at age 80     Family History Negative Mother          Diedn her 80's of unknown causes.  Pt otherwise unaware of her health hx.      Unknown/Adopted Mother      Diabetes Brother         (Christian)  of DM complications at age 50.     Alcohol/Drug Brother         Christian     C.A.D. Brother         hCristian.      Heart Disease Brother         Christian--MI age 50.     Family History Negative Brother      Family History Negative Brother      Family History Negative Sister      Family History Negative Sister      Family History Negative Son      Family History Negative Daughter      Family History Negative Daughter          Reviewed and updated as needed this visit by Provider         Review of Systems   ROS COMP: see above      Objective    /80   Pulse 77   Temp 97.1  F (36.2  C) (Tympanic)   Resp 18   Ht 1.676 m (5' 6\")   SpO2 95%   BMI 48.81 kg/m    Body mass index is 48.81 kg/m .  Physical Exam "   GENERAL: alert and no distress  RESP: lungs clear to auscultation - no rales, rhonchi or wheezes  CV: regular rates and rhythm  MS: she is ambulating only via wheelchair. there is no evidence of warmth, mass or pain in the left lower extremity. Need for ultrasound. The left toe shows some onychomycosis of the nail. There is no swelling, warmth or erythema. It is mildly still on examination but active ROM is wnl.   PSYCH: mentation appears normal and affect normal    Diagnostic Test Results:  Xray - no evidence of toe fracture; await radiology         Assessment & Plan     1. Acute thromboembolism of deep veins of left lower extremity (H)  2. Long term current use of anticoagulant therapy  Need for updated ultrasound to assess clot. If seeing improvement will likely remove her anticoagulation. Reviewed that if we do this, getting more active and wearing her compression devices will still be very important as her immobility will still be a mild risk for clots. She verbalized understanding. Await ultrasound results  - US Lower Extremity Venous Duplex Left; Future    3. Great toe pain, left  No obvious fracture. Await radiology. Consider podiatry  - XR Toe Left G/E 2 Views; Future    4. Weakness of both lower extremities  5. Wheelchair dependence  I was quite surprised to hear Cristy tell me that her leg pain was vastly improved. This is significant when considering the past year and a half. There is still the toe pain that is bothering her but we'll work to help with that, and consider podiatry. If she can stop the coumadin, as per above, Jenifer reviewed with her I would still think it a good idea to continue home cares as I think at this point, as she has been wheelchair bound I believe 5 years, she needs a concerted effort to help with restrengthening/mobility. We will try to set this up. I do believe she should be able to be ambulatory down the road, even if only with a walker initially.         Return in about 3  months (around 11/30/2019).    Manoj Daley PA-C  [facility]

## 2019-08-30 ENCOUNTER — TELEPHONE (OUTPATIENT)
Dept: FAMILY MEDICINE | Facility: CLINIC | Age: 74
End: 2019-08-30

## 2019-08-30 ENCOUNTER — ANCILLARY PROCEDURE (OUTPATIENT)
Dept: GENERAL RADIOLOGY | Facility: CLINIC | Age: 74
End: 2019-08-30
Attending: PHYSICIAN ASSISTANT
Payer: COMMERCIAL

## 2019-08-30 ENCOUNTER — OFFICE VISIT (OUTPATIENT)
Dept: FAMILY MEDICINE | Facility: CLINIC | Age: 74
End: 2019-08-30
Payer: COMMERCIAL

## 2019-08-30 VITALS
OXYGEN SATURATION: 95 % | RESPIRATION RATE: 18 BRPM | SYSTOLIC BLOOD PRESSURE: 130 MMHG | DIASTOLIC BLOOD PRESSURE: 80 MMHG | HEIGHT: 66 IN | BODY MASS INDEX: 48.81 KG/M2 | HEART RATE: 77 BPM | TEMPERATURE: 97.1 F

## 2019-08-30 DIAGNOSIS — M79.675 GREAT TOE PAIN, LEFT: ICD-10-CM

## 2019-08-30 DIAGNOSIS — Z99.3 WHEELCHAIR DEPENDENCE: ICD-10-CM

## 2019-08-30 DIAGNOSIS — I82.402 ACUTE THROMBOEMBOLISM OF DEEP VEINS OF LEFT LOWER EXTREMITY (H): Primary | ICD-10-CM

## 2019-08-30 DIAGNOSIS — R29.898 WEAKNESS OF BOTH LOWER EXTREMITIES: ICD-10-CM

## 2019-08-30 DIAGNOSIS — Z79.01 LONG TERM CURRENT USE OF ANTICOAGULANT THERAPY: ICD-10-CM

## 2019-08-30 PROCEDURE — 73660 X-RAY EXAM OF TOE(S): CPT | Mod: LT

## 2019-08-30 PROCEDURE — 99214 OFFICE O/P EST MOD 30 MIN: CPT | Performed by: PHYSICIAN ASSISTANT

## 2019-08-30 NOTE — PATIENT INSTRUCTIONS
Please get your ultrasound at [facility]. If it shows improvement of the clot, then very likely we can get off of the medication for clotting (coumadin)    You can call [phone] to schedule your imaging at [facility].

## 2019-08-30 NOTE — TELEPHONE ENCOUNTER
Type of outreach:  Discussed HM with patient at    Health Maintenance Due   Topic Date Due     HEPATITIS C SCREENING  1945     ZOSTER IMMUNIZATION (1 of 2) 05/30/1997     MAMMO SCREENING  09/23/2006     PNEUMOCOCCAL IMMUNIZATION 65+ LOW/MEDIUM RISK (1 of 2 - PCV13) 05/30/2012     MEDICARE ANNUAL WELLNESS VISIT  08/02/2012     LIPID  03/25/2019     Patient declines doing mammograms.   Kiera Appiah MA

## 2019-09-04 ENCOUNTER — ANTICOAGULATION THERAPY VISIT (OUTPATIENT)
Dept: NURSING | Facility: CLINIC | Age: 74
End: 2019-09-04
Payer: COMMERCIAL

## 2019-09-04 DIAGNOSIS — Z79.01 LONG TERM CURRENT USE OF ANTICOAGULANT THERAPY: ICD-10-CM

## 2019-09-04 DIAGNOSIS — I82.402 ACUTE THROMBOEMBOLISM OF DEEP VEINS OF LEFT LOWER EXTREMITY (H): ICD-10-CM

## 2019-09-04 LAB — INR POINT OF CARE: 3.4 (ref 0.86–1.14)

## 2019-09-04 PROCEDURE — 85610 PROTHROMBIN TIME: CPT | Mod: QW | Performed by: PHYSICIAN ASSISTANT

## 2019-09-04 PROCEDURE — 99207 ZZC NO CHARGE NURSE ONLY: CPT | Performed by: PHYSICIAN ASSISTANT

## 2019-09-04 PROCEDURE — 36416 COLLJ CAPILLARY BLOOD SPEC: CPT | Performed by: PHYSICIAN ASSISTANT

## 2019-09-04 NOTE — PROGRESS NOTES
ANTICOAGULATION FOLLOW-UP CLINIC VISIT    Patient Name:  Cristy Bailey  Date:  9/4/2019  Contact Type:  Telephone    SUBJECTIVE:  Patient Findings     Comments:   [facility] Kelley CHEW, called with patient's INR today at 3.4. Patient states left big toe is causing her some pain, also noted at appt with PCP. Kelley states there are no visual changes noted. Patient/homecare nurse denies any identifiable changes that caused the supratherapeutic INR. The patient was assessed for diet, medication, and activity level changes, missed or extra doses, bruising or bleeding, with no problem findings. Patient has been fairly stable on current dose last 2 months. Per protocol will have patient hold tonight's dose then return to maintenance dosing and HC RN will recheck INR in another two weeks.  Keena CHAPMAN RN  Anticoagulation Clinic  [place]            Clinical Outcomes     Comments:   [facility] Kelley CHEW, called with patient's INR today at 3.4. Patient states left big toe is causing her some pain, also noted at appt with PCP. Kelley states there are no visual changes noted. Patient/homecare nurse denies any identifiable changes that caused the supratherapeutic INR. The patient was assessed for diet, medication, and activity level changes, missed or extra doses, bruising or bleeding, with no problem findings. Patient has been fairly stable on current dose last 2 months. Per protocol will have patient hold tonight's dose then return to maintenance dosing and HC RN will recheck INR in another two weeks.  Keena CHAPMAN RN  Anticoagulation Clinic  [place]               OBJECTIVE    INR Protime   Date Value Ref Range Status   09/04/2019 3.4 (A) 0.86 - 1.14 Final       ASSESSMENT / PLAN  INR assessment SUPRA    Recheck INR In: 2 WEEKS    INR Location Homecare INR      Anticoagulation Summary  As of 9/4/2019    INR goal:   2.0-3.0   TTR:   67.4 % (5.7 mo)   INR used for dosing:   3.4! (9/4/2019)   Warfarin maintenance plan:   5 mg (2.5 mg x 2) every  Mon, Fri; 2.5 mg (2.5 mg x 1) all other days   Full warfarin instructions:   9/4: Hold; Otherwise 5 mg every Mon, Fri; 2.5 mg all other days   Weekly warfarin total:   22.5 mg   Plan last modified:   Keena Yo RN (6/26/2019)   Next INR check:   9/18/2019   Target end date:   6/6/2019    Indications    Acute thromboembolism of deep veins of left lower extremity (H) [I82.402]  Long term current use of anticoagulant therapy [Z79.01]             Anticoagulation Episode Summary     INR check location:   Home Draw    Preferred lab:       Send INR reminders to:   TAHIR DIEHL    Comments:   2.5 mg tabs      Anticoagulation Care Providers     Provider Role Specialty Phone number    Manoj Daley PA-C Referring Physician Assistant [phone]            See the Encounter Report to view Anticoagulation Flowsheet and Dosing Calendar (Go to Encounters tab in chart review, and find the Anticoagulation Therapy Visit)    Dosage adjustment made based on physician directed care plan.    Keena Yo RN

## 2019-09-06 ENCOUNTER — HOSPITAL ENCOUNTER (OUTPATIENT)
Dept: ULTRASOUND IMAGING | Facility: CLINIC | Age: 74
Discharge: HOME | End: 2019-09-06
Attending: PHYSICIAN ASSISTANT | Admitting: PHYSICIAN ASSISTANT
Payer: COMMERCIAL

## 2019-09-06 DIAGNOSIS — I82.402 ACUTE THROMBOEMBOLISM OF DEEP VEINS OF LEFT LOWER EXTREMITY (H): ICD-10-CM

## 2019-09-06 DIAGNOSIS — Z79.01 LONG TERM CURRENT USE OF ANTICOAGULANT THERAPY: ICD-10-CM

## 2019-09-06 PROCEDURE — 93971 EXTREMITY STUDY: CPT | Mod: LT

## 2019-09-09 ENCOUNTER — TELEPHONE (OUTPATIENT)
Dept: FAMILY MEDICINE | Facility: CLINIC | Age: 74
End: 2019-09-09

## 2019-09-09 ENCOUNTER — ANTICOAGULATION THERAPY VISIT (OUTPATIENT)
Dept: NURSING | Facility: CLINIC | Age: 74
End: 2019-09-09

## 2019-09-09 DIAGNOSIS — I82.402 ACUTE THROMBOEMBOLISM OF DEEP VEINS OF LEFT LOWER EXTREMITY (H): ICD-10-CM

## 2019-09-09 DIAGNOSIS — Z79.01 LONG TERM CURRENT USE OF ANTICOAGULANT THERAPY: ICD-10-CM

## 2019-09-09 NOTE — TELEPHONE ENCOUNTER
Called [facility] RN, Kelley, they patient's  to inform/discuss at [phone].     Kelley will discuss with PT. Ask PCP to put order in epic for PT/OT eval and they will reopen her case for new admit to use [facility], because she's been on a [facility] payor program for INR checks and they won't pay for therapy.     Routing to PCP for orders.    Closing Anticoagulation Clinic episode.    Keena CHAPMAN, Triage RN

## 2019-09-09 NOTE — TELEPHONE ENCOUNTER
Please call home care nursing.  They an stop the INR checks. I will include INR team on this note so they are also informed.  Additionally, I would like to initiate some more formal therapy to help with strength and conditioning with the expected goal to get her out of her wheelchair - not sure if just physical therapy or OT as well? How is it best to go about getting home care to initiate this process.

## 2019-09-09 NOTE — PROGRESS NOTES
Therapy complete. Patient stopped taking warfarin 9/6/19. Notified by PCP. This RN called homecare , Kelley, to notify her on 9/9/19.    Keena CHAPMAN RN  Anticoagulation Clinic  [place]

## 2019-09-11 ENCOUNTER — MEDICAL CORRESPONDENCE (OUTPATIENT)
Dept: HEALTH INFORMATION MANAGEMENT | Facility: CLINIC | Age: 74
End: 2019-09-11

## 2019-09-19 ENCOUNTER — TELEPHONE (OUTPATIENT)
Dept: FAMILY MEDICINE | Facility: CLINIC | Age: 74
End: 2019-09-19

## 2019-09-19 DIAGNOSIS — Z53.9 DIAGNOSIS NOT YET DEFINED: Primary | ICD-10-CM

## 2019-09-19 PROCEDURE — G0180 MD CERTIFICATION HHA PATIENT: HCPCS | Performed by: FAMILY MEDICINE

## 2019-09-19 NOTE — TELEPHONE ENCOUNTER
Received Home Health Certification and Plan of Care, placed in Dr. Duke's in basket.    Please review, sign and fax back to [phone].

## 2019-09-19 NOTE — TELEPHONE ENCOUNTER
Home Health Certification and Plan of Care has been faxed to [phone] and sent to abstraction.  Joann Adames, CMA

## 2019-09-25 ENCOUNTER — DOCUMENTATION ONLY (OUTPATIENT)
Dept: FAMILY MEDICINE | Facility: CLINIC | Age: 74
End: 2019-09-25

## 2019-09-25 NOTE — PROGRESS NOTES
Pt reporting 10/10 pain in her L great toe.  Asking therapist if she has Gout.  Toe is slightly erythmatic.   Please advise pt on what to do for her toe.     Lymphedema therapy evaluation only performed today. Therapist has worked with/several times to get velcro garments to control edema. Pt states that she is not able to wear them as they are too hot.  Pt will continue to wear tubigrip per her choice. Educated pt that this is not enough compression to control level of swelling she has.  She voiced understanding.  Anxiety variable during visit today.   Radha Sepulveda OTR/L CLT

## 2019-09-26 ENCOUNTER — TELEPHONE (OUTPATIENT)
Dept: FAMILY MEDICINE | Facility: CLINIC | Age: 74
End: 2019-09-26

## 2019-09-26 DIAGNOSIS — Z53.9 DIAGNOSIS NOT YET DEFINED: Primary | ICD-10-CM

## 2019-09-26 PROCEDURE — G0179 MD RECERTIFICATION HHA PT: HCPCS | Performed by: FAMILY MEDICINE

## 2019-09-26 NOTE — TELEPHONE ENCOUNTER
Received Home Health Certification and Plan of Care, placed in Dr. Duke's in basket.    Please review, sign and fax back to [phone].

## 2019-09-26 NOTE — PROGRESS NOTES
"Patient wants to wait until Kulwant returns. States she has \"been this way since forever\".     Patient states pain is 5-7 not a 10. She was already told by Kulwant it wasn't gout. She states she has no problem other than toe hurting all the time, but that's nothing new. Her pain today is no worse than at any other time. She states maybe there was some miscommunication with the therapist.     Explained Kulwant is currently out of clinic and does not have available appointments for quite some time. Offered to make appt with another provider. Patient is not interested in this, she will only see Kulwant.  At LOV 8/30/19, Kulwant advised patient to return to clinic for f/u around 11/30/19. Offered to help schedule appt, patient in agreement.  Next 5 appointments (look out 90 days)    Nov 26, 2019 11:20 AM CST  [facility]-C  [facility] [address] [facility] [address] [phone]        Keena CHAPMAN Triage RN       "

## 2019-10-11 ENCOUNTER — TELEPHONE (OUTPATIENT)
Dept: FAMILY MEDICINE | Facility: CLINIC | Age: 74
End: 2019-10-11

## 2019-10-11 DIAGNOSIS — Z53.9 DIAGNOSIS NOT YET DEFINED: Primary | ICD-10-CM

## 2019-10-11 PROCEDURE — G0180 MD CERTIFICATION HHA PATIENT: HCPCS | Performed by: FAMILY MEDICINE

## 2019-10-11 NOTE — TELEPHONE ENCOUNTER
Received Home Health Certification and Plan of Care, placed in Arcaris's in basket.    Please review, sign and give to Dr. Duke to sign then fax back to [phone].

## 2019-11-06 ENCOUNTER — HEALTH MAINTENANCE LETTER (OUTPATIENT)
Age: 74
End: 2019-11-06

## 2019-12-30 ENCOUNTER — TELEPHONE (OUTPATIENT)
Dept: FAMILY MEDICINE | Facility: CLINIC | Age: 74
End: 2019-12-30

## 2019-12-30 NOTE — TELEPHONE ENCOUNTER
Pt calls.    She is been having trouble going to the bathroom - having stools.  She has not gone for 4 days.  She took clearlax yesterday and that has not helped.  She is scared to eat cause she has not went to the bathroom.  She is passing gas once in a while, but not real often.  No nausea or vomiting.  She has a little pain in her right side.  She is rating the pain 5/10.  It is enough to be irritating all of the time.  No fever.  She has not tried a suppository yet or enema.  She took a stool softener.        She normally goes to the bathroom every other day.  The last time she had a stool was 4 days ago and the stool was so hard, she could hardly get it out.      She is trying to drink lots of water.  Advised to continue to drink lots of fluids.  She has been eating apples, high fiber cereal.      She says her tummy feels soft.      Will forward to Kulwant Daley for recommendation.  Do you want her to do a phone visit or can you recommend suppository, etc?

## 2019-12-30 NOTE — TELEPHONE ENCOUNTER
Ok to continue daily clearlax. Increase fluids. Could add Senna tomorrow which is stimulant laxative. Follow-up if not improving

## 2020-02-16 ENCOUNTER — HEALTH MAINTENANCE LETTER (OUTPATIENT)
Age: 75
End: 2020-02-16

## 2020-08-06 ENCOUNTER — NURSE TRIAGE (OUTPATIENT)
Dept: FAMILY MEDICINE | Facility: CLINIC | Age: 75
End: 2020-08-06

## 2020-08-06 NOTE — TELEPHONE ENCOUNTER
"Patient calling stating her bottom is sore and very red she believes this is from sitting too much- she is mostly in a wheel chair- patient is tearful on the phone and is wondering what she should do. No other symptoms., but the sore is  Painful- she states she has taken tylenol which helps sometimes.     recommending patient to try to shift weight in chair and to try to relive pressure on buttocks by walking and getting out of the chair- patient said she is able to do this with the help of a friend that is living with her. Adv to keep skin clean and dry, continue to use the barrier cream. Patient would like to know if Kulwant would recommend anything else- or if there is anything else she can sit on to make it feel better. Like a foam seat or something. Will forward to Kulwant please advise. Would you like an appt for this?      Sharita Seo RN on 8/6/2020 at 11:51 AM        Keep skin clean and dry. Wash the skin with a gentle cleanser and pat dry. Do this cleansing routine regularly to limit the skin's exposure to moisture, urine and stool. Protect the skin. Use moisture barrier creams to protect the skin from urine and stool. Change bedding and clothing frequently if needed. Watch for buttons on the clothing and wrinkles in the bedding that irritate the skin. Inspect the skin daily. Look closely at your skin daily for warning signs of a pressure sore.    Answer Assessment - Initial Assessment Questions  1. APPEARANCE of SORES: \"What do the sores look like?\"      Sore from siting so much and I wanted to the doctor to recommend it it bright red and pealing   2. NUMBER: \"How many sores are there?\"      It is just all over   3. SIZE: \"How big is the largest sore?\"        4. LOCATION: \"Where are the sores located?\"      On my bottom is in painful   5. ONSET: \"When did the sores begin?\"      It has been ongoing   6. CAUSE: \"What do you think is causing the sores?\"      I think it is from sitting too much   7. OTHER SYMPTOMS: " "\"Do you have any other symptoms?\" (e.g., fever, new weakness)      No other symptoms., but the sore is  Painful- patient is tearful- recommending patient to try to shift weight in chair and to try to relive pressure on buttocks by walking and getting out of the chair- patient said she is able to do this with the help of a friend that is living with her. Adv to keep skin clean and dry, continue to use the barrier cream. Patient would like to know if Kulwant would recommend anything else- or if there is anything else she can sit on to make it feel better.    Protocols used: SORES-GUILLE      "

## 2020-08-06 NOTE — TELEPHONE ENCOUNTER
Given her WC bound status would be worried for ulcer  Vs other infection. She should be seen, including urgent care.

## 2020-08-06 NOTE — TELEPHONE ENCOUNTER
Reason for call:  Symptom   Symptom or request: blisters on rear    Duration (how long have symptoms been present): 3 weeks  Have you been treated for this before? Yes    Additional comments: gets them from sitting so much.  Has been using desitin at home  Phone number to reach patient:  Home number on file [phone] (home)    Best Time:  any    Can we leave a detailed message on this number?  YES    Travel screening: Not Applicable

## 2020-08-07 NOTE — TELEPHONE ENCOUNTER
Called patient and informed patient of provider message- she stated she would make an appt. She might be seen in urgent care.    Sharita Seo RN on 8/7/2020 at 8:11 AM

## 2020-11-29 ENCOUNTER — HEALTH MAINTENANCE LETTER (OUTPATIENT)
Age: 75
End: 2020-11-29

## 2021-01-01 ENCOUNTER — PATIENT OUTREACH (OUTPATIENT)
Dept: CARE COORDINATION | Facility: CLINIC | Age: 76
End: 2021-01-01
Payer: COMMERCIAL

## 2021-01-01 VITALS
RESPIRATION RATE: 18 BRPM | HEART RATE: 74 BPM | BODY MASS INDEX: 45.99 KG/M2 | WEIGHT: 293 LBS | OXYGEN SATURATION: 92 % | TEMPERATURE: 97.4 F | SYSTOLIC BLOOD PRESSURE: 126 MMHG | HEIGHT: 67 IN | DIASTOLIC BLOOD PRESSURE: 54 MMHG

## 2021-01-01 LAB
MAGNESIUM SERPL-MCNC: 2.1 MG/DL (ref 1.6–2.3)
POTASSIUM BLD-SCNC: 3.8 MMOL/L (ref 3.4–5.3)

## 2021-01-01 PROCEDURE — 36415 COLL VENOUS BLD VENIPUNCTURE: CPT | Performed by: INTERNAL MEDICINE

## 2021-01-01 PROCEDURE — 250N000011 HC RX IP 250 OP 636: Performed by: INTERNAL MEDICINE

## 2021-01-01 PROCEDURE — 99239 HOSP IP/OBS DSCHRG MGMT >30: CPT | Performed by: INTERNAL MEDICINE

## 2021-01-01 PROCEDURE — 83735 ASSAY OF MAGNESIUM: CPT | Performed by: INTERNAL MEDICINE

## 2021-01-01 PROCEDURE — 84132 ASSAY OF SERUM POTASSIUM: CPT | Performed by: INTERNAL MEDICINE

## 2021-01-01 RX ADMIN — ENOXAPARIN SODIUM 40 MG: 40 INJECTION SUBCUTANEOUS at 06:08

## 2021-01-01 ASSESSMENT — ACTIVITIES OF DAILY LIVING (ADL)
ADLS_ACUITY_SCORE: 29

## 2021-01-25 ENCOUNTER — VIRTUAL VISIT (OUTPATIENT)
Dept: FAMILY MEDICINE | Facility: CLINIC | Age: 76
End: 2021-01-25
Payer: COMMERCIAL

## 2021-01-25 ENCOUNTER — TELEPHONE (OUTPATIENT)
Dept: FAMILY MEDICINE | Facility: CLINIC | Age: 76
End: 2021-01-25

## 2021-01-25 DIAGNOSIS — R05.9 COUGH: Primary | ICD-10-CM

## 2021-01-25 PROCEDURE — 99441 PR PHYSICIAN TELEPHONE EVALUATION 5-10 MIN: CPT | Mod: 95 | Performed by: NURSE PRACTITIONER

## 2021-01-25 RX ORDER — ALBUTEROL SULFATE 0.83 MG/ML
2.5 SOLUTION RESPIRATORY (INHALATION) EVERY 6 HOURS PRN
Qty: 75 ML | Refills: 0 | Status: SHIPPED | OUTPATIENT
Start: 2021-01-25

## 2021-01-25 NOTE — PROGRESS NOTES
Cristy is a 75 year old who is being evaluated via a billable telephone visit.      What phone number would you like to be contacted at?   How would you like to obtain your AVS?   Assessment & Plan     Cough  Discussed options.  Recommended covid testing.  Pt declined.  Unable to leave home due to mobility issues.  She seems to be tolerating symptoms well.  Will refill nebs and monitor.  She will need a visit if symptoms persist.  I did discuss care coordination working with pt.  She is agreeable.  - albuterol (PROVENTIL) (2.5 MG/3ML) 0.083% neb solution; Take 1 vial (2.5 mg) by nebulization every 6 hours as needed for shortness of breath / dyspnea or wheezing  - CARE COORDINATION REFERRAL        Return in about 3 months (around 4/25/2021) for Physical Exam.    ISRAEL Maldonado Ra, CNP  [facility]    Subjective     Cristy is a 75 year old who presents to clinic today for the following health issues     HPI       Acute Illness  Acute illness concerns: cough  Onset/Duration: x 2-3 weeks  Symptoms:  Fever: no  Chills/Sweats: no  Headache (location?): no  Sinus Pressure: no  Conjunctivitis:  no  Ear Pain: no  Rhinorrhea: YES- little bit  Congestion: no  Sore Throat: no  Cough: YES-non-productive, productive of yellow sputum, with shortness of breath  Wheeze: YES  Decreased Appetite: no  Nausea: no  Vomiting: no  Diarrhea: no  Dysuria/Freq.: no  Dysuria or Hematuria: no  Fatigue/Achiness: no  Sick/Strep Exposure: no  Therapies tried and outcome: albuterol neb, helped    Symptoms x2-3 weeks.  Cough, productive.  No fever or URI symptoms.  No chest pain, palpitations.  She is unable to leave her home because she has steps in her home and is unable to navigate.  Uses a wheelchair to get to the bathroom.  She was using old neb treatments with good results.  Would like to resume these initially.    Review of Systems   Constitutional, HEENT, cardiovascular, pulmonary, gi and gu systems are negative,  except as otherwise noted.      Objective           Vitals:  No vitals were obtained today due to virtual visit.    Physical Exam   healthy, alert and no distress  PSYCH: Alert and oriented times 3; coherent speech, normal   rate and volume, able to articulate logical thoughts, able   to abstract reason, no tangential thoughts, no hallucinations   or delusions  Her affect is normal  RESP: No cough, no audible wheezing, able to talk in full sentences  Remainder of exam unable to be completed due to telephone visits                Phone call duration: 5 minutes

## 2021-01-26 ENCOUNTER — PATIENT OUTREACH (OUTPATIENT)
Dept: NURSING | Facility: CLINIC | Age: 76
End: 2021-01-26
Payer: COMMERCIAL

## 2021-01-26 NOTE — PROGRESS NOTES
Clinic Care Coordination Contact  Community Health Worker Initial Outreach  Spoke with patient     Chart Review: Referral from provider, Reason for Referral: pt unable to leave her home due to mobility issues.  Are there any mobile unit services available to help her either get in to see her PCP or to see her in her home? RENE    CHW Initial Information Gathering:  Referral Source: PCP  Preferred Hospital: [facility]  [phone]  No PCP office visit in Past Year: No  Transportation means:: Family  CHW Additional Questions  Medication changes made following ED/Hospital discharge?: N/A  MyChart active?: Yes  Patient sent Social Determinants of Health questionnaire?: Yes    CHW introduced self and role with CC  Patient states that she is feeling better after using the nebulizer.   Patient states that she is in need of transportation resources when she needs to go somewhere. Patient asked man who was in the same room (significant other?) if he was able to get her in the car and bring her somewhere, he replies that he would be able to.   Patient questions resources available, CHW explains [employer] and the application process. It may be good to have resources for back up. Patient agrees.    Patient accepts CC: Yes. Patient scheduled for assessment with [facility] on 1/28 at 2:00pm. Patient noted desire to discuss transportation resources.     NEIDA Hernadez, B.S. [facility] Care Coordination  [facility]:  [place], [place]  Phone: [phone]

## 2021-01-28 ENCOUNTER — PATIENT OUTREACH (OUTPATIENT)
Dept: CARE COORDINATION | Facility: CLINIC | Age: 76
End: 2021-01-28

## 2021-01-28 NOTE — PROGRESS NOTES
Clinic Care Coordination Contact    Situation: Patient chart reviewed by care coordinator.    Background: Patient scheduled to talk about transportation with TONY FENTON.     Assessment: Patient was sleeping and was groggy.  Didn't know purpose of the call. She would like to know if there are any options for transportation. She is out of service area for [employer] in [place]. She knows this as they tried in the past. Discussed [employer] loop bus and that won't work for her.  She does not have funding to pay for medical rides.      Plan/Recommendations: She does not want to enroll in care coordination.  She and her partner will continue as they are. He is able to get out.  No further outreach by TONY FENTON.     Francisca Hernandez,   [facility]  [phone]

## 2021-04-10 ENCOUNTER — HEALTH MAINTENANCE LETTER (OUTPATIENT)
Age: 76
End: 2021-04-10

## 2021-05-04 ENCOUNTER — TELEPHONE (OUTPATIENT)
Dept: FAMILY MEDICINE | Facility: CLINIC | Age: 76
End: 2021-05-04

## 2021-05-04 NOTE — TELEPHONE ENCOUNTER
Orders received, signed and mailed back at the fax number [phone] & [phone] would not go thru.    [address] [place] [address]

## 2021-05-28 NOTE — PROGRESS NOTES
SWS    D: Per request to see regarding discharge planning. Chart reviewed noting per ortho PA the anticipation of discharge on Friday, home vs TCU to be determined. Noted PT assessment with recommendation at this time for pt's transfer to TCU. Attempt to meet with pt, with PT at this point, pt noting however that her gaol is to discharge to home on discharge.      A/P: SW will follow-up with pt tomorrow for discharge planning.    None

## 2021-09-19 ENCOUNTER — HEALTH MAINTENANCE LETTER (OUTPATIENT)
Age: 76
End: 2021-09-19

## 2021-11-06 ENCOUNTER — APPOINTMENT (OUTPATIENT)
Dept: CT IMAGING | Facility: CLINIC | Age: 76
DRG: 871 | End: 2021-11-06
Attending: EMERGENCY MEDICINE
Payer: COMMERCIAL

## 2021-11-06 ENCOUNTER — HOSPITAL ENCOUNTER (INPATIENT)
Facility: CLINIC | Age: 76
LOS: 16 days | Discharge: LEFT BEFORE DISCHARGE | DRG: 871 | End: 2021-11-22
Attending: EMERGENCY MEDICINE | Admitting: INTERNAL MEDICINE
Payer: COMMERCIAL

## 2021-11-06 DIAGNOSIS — M62.81 GENERALIZED MUSCLE WEAKNESS: ICD-10-CM

## 2021-11-06 DIAGNOSIS — L03.115 CELLULITIS OF RIGHT HIP: ICD-10-CM

## 2021-11-06 DIAGNOSIS — R26.2 UNABLE TO AMBULATE: ICD-10-CM

## 2021-11-06 PROBLEM — L03.90 CELLULITIS: Status: ACTIVE | Noted: 2021-11-06

## 2021-11-06 LAB
ABO/RH(D): NORMAL
ALBUMIN SERPL-MCNC: 2.1 G/DL (ref 3.4–5)
ALP SERPL-CCNC: 94 U/L (ref 40–150)
ALT SERPL W P-5'-P-CCNC: 32 U/L (ref 0–50)
ANION GAP SERPL CALCULATED.3IONS-SCNC: 3 MMOL/L (ref 3–14)
ANTIBODY SCREEN: NEGATIVE
AST SERPL W P-5'-P-CCNC: 55 U/L (ref 0–45)
BASE EXCESS BLDV CALC-SCNC: 2.1 MMOL/L (ref -7.7–1.9)
BASOPHILS # BLD AUTO: 0 10E3/UL (ref 0–0.2)
BASOPHILS # BLD AUTO: 0.1 10E3/UL (ref 0–0.2)
BASOPHILS NFR BLD AUTO: 0 %
BASOPHILS NFR BLD AUTO: 0 %
BILIRUB SERPL-MCNC: 1.1 MG/DL (ref 0.2–1.3)
BUN SERPL-MCNC: 12 MG/DL (ref 7–30)
CALCIUM SERPL-MCNC: 7.1 MG/DL (ref 8.5–10.1)
CHLORIDE BLD-SCNC: 108 MMOL/L (ref 94–109)
CK SERPL-CCNC: 104 U/L (ref 30–225)
CO2 SERPL-SCNC: 26 MMOL/L (ref 20–32)
CREAT SERPL-MCNC: 0.57 MG/DL (ref 0.52–1.04)
CREAT SERPL-MCNC: 0.67 MG/DL (ref 0.52–1.04)
CRP SERPL-MCNC: 97.9 MG/L (ref 0–8)
D DIMER PPP FEU-MCNC: 1.76 UG/ML FEU (ref 0–0.5)
EOSINOPHIL # BLD AUTO: 0 10E3/UL (ref 0–0.7)
EOSINOPHIL # BLD AUTO: 0 10E3/UL (ref 0–0.7)
EOSINOPHIL NFR BLD AUTO: 0 %
EOSINOPHIL NFR BLD AUTO: 0 %
ERYTHROCYTE [DISTWIDTH] IN BLOOD BY AUTOMATED COUNT: 13.8 % (ref 10–15)
ERYTHROCYTE [DISTWIDTH] IN BLOOD BY AUTOMATED COUNT: 14.2 % (ref 10–15)
GFR SERPL CREATININE-BSD FRML MDRD: 87 ML/MIN/1.73M2
GFR SERPL CREATININE-BSD FRML MDRD: >90 ML/MIN/1.73M2
GLUCOSE BLD-MCNC: 119 MG/DL (ref 70–99)
HCO3 BLDV-SCNC: 28 MMOL/L (ref 21–28)
HCO3 BLDV-SCNC: 30 MMOL/L (ref 21–28)
HCT VFR BLD AUTO: 42.9 % (ref 35–47)
HCT VFR BLD AUTO: 44.2 % (ref 35–47)
HGB BLD-MCNC: 13.8 G/DL (ref 11.7–15.7)
HGB BLD-MCNC: 13.8 G/DL (ref 11.7–15.7)
HOLD SPECIMEN: NORMAL
IMM GRANULOCYTES # BLD: 0.3 10E3/UL
IMM GRANULOCYTES # BLD: 0.6 10E3/UL
IMM GRANULOCYTES NFR BLD: 1 %
IMM GRANULOCYTES NFR BLD: 2 %
LACTATE BLD-SCNC: 2.4 MMOL/L
LACTATE SERPL-SCNC: 1.8 MMOL/L (ref 0.7–2)
LYMPHOCYTES # BLD AUTO: 0.4 10E3/UL (ref 0.8–5.3)
LYMPHOCYTES # BLD AUTO: 0.4 10E3/UL (ref 0.8–5.3)
LYMPHOCYTES NFR BLD AUTO: 1 %
LYMPHOCYTES NFR BLD AUTO: 2 %
MAGNESIUM SERPL-MCNC: 1.7 MG/DL (ref 1.6–2.3)
MCH RBC QN AUTO: 29.2 PG (ref 26.5–33)
MCH RBC QN AUTO: 29.8 PG (ref 26.5–33)
MCHC RBC AUTO-ENTMCNC: 31.2 G/DL (ref 31.5–36.5)
MCHC RBC AUTO-ENTMCNC: 32.2 G/DL (ref 31.5–36.5)
MCV RBC AUTO: 93 FL (ref 78–100)
MCV RBC AUTO: 93 FL (ref 78–100)
MONOCYTES # BLD AUTO: 0.4 10E3/UL (ref 0–1.3)
MONOCYTES # BLD AUTO: 0.8 10E3/UL (ref 0–1.3)
MONOCYTES NFR BLD AUTO: 2 %
MONOCYTES NFR BLD AUTO: 2 %
NEUTROPHILS # BLD AUTO: 21.1 10E3/UL (ref 1.6–8.3)
NEUTROPHILS # BLD AUTO: 29 10E3/UL (ref 1.6–8.3)
NEUTROPHILS NFR BLD AUTO: 95 %
NEUTROPHILS NFR BLD AUTO: 95 %
NRBC # BLD AUTO: 0 10E3/UL
NRBC # BLD AUTO: 0 10E3/UL
NRBC BLD AUTO-RTO: 0 /100
NRBC BLD AUTO-RTO: 0 /100
NT-PROBNP SERPL-MCNC: 718 PG/ML (ref 0–900)
O2/TOTAL GAS SETTING VFR VENT: 0 %
OXYHGB MFR BLDV: 42 % (ref 70–75)
PCO2 BLDV: 44 MM HG (ref 40–50)
PCO2 BLDV: 45 MM HG (ref 40–50)
PH BLDV: 7.4 [PH] (ref 7.32–7.43)
PH BLDV: 7.44 [PH] (ref 7.32–7.43)
PLATELET # BLD AUTO: 268 10E3/UL (ref 150–450)
PLATELET # BLD AUTO: 285 10E3/UL (ref 150–450)
PO2 BLDV: 21 MM HG (ref 25–47)
PO2 BLDV: 22 MM HG (ref 25–47)
POTASSIUM BLD-SCNC: 4.1 MMOL/L (ref 3.4–5.3)
POTASSIUM BLD-SCNC: 5.1 MMOL/L (ref 3.4–5.3)
PROLACTIN SERPL-MCNC: 7 UG/L (ref 3–27)
PROT SERPL-MCNC: 6.4 G/DL (ref 6.8–8.8)
RBC # BLD AUTO: 4.63 10E6/UL (ref 3.8–5.2)
RBC # BLD AUTO: 4.73 10E6/UL (ref 3.8–5.2)
SAO2 % BLDV: 36 % (ref 94–100)
SARS-COV-2 RNA RESP QL NAA+PROBE: NEGATIVE
SODIUM SERPL-SCNC: 137 MMOL/L (ref 133–144)
SPECIMEN EXPIRATION DATE: NORMAL
TROPONIN I SERPL-MCNC: <0.015 UG/L (ref 0–0.04)
WBC # BLD AUTO: 22.2 10E3/UL (ref 4–11)
WBC # BLD AUTO: 30.8 10E3/UL (ref 4–11)

## 2021-11-06 PROCEDURE — 258N000003 HC RX IP 258 OP 636: Performed by: EMERGENCY MEDICINE

## 2021-11-06 PROCEDURE — 82565 ASSAY OF CREATININE: CPT | Performed by: INTERNAL MEDICINE

## 2021-11-06 PROCEDURE — 250N000011 HC RX IP 250 OP 636

## 2021-11-06 PROCEDURE — 82550 ASSAY OF CK (CPK): CPT | Performed by: EMERGENCY MEDICINE

## 2021-11-06 PROCEDURE — 96375 TX/PRO/DX INJ NEW DRUG ADDON: CPT

## 2021-11-06 PROCEDURE — 86900 BLOOD TYPING SEROLOGIC ABO: CPT | Performed by: EMERGENCY MEDICINE

## 2021-11-06 PROCEDURE — 99223 1ST HOSP IP/OBS HIGH 75: CPT | Mod: AI | Performed by: INTERNAL MEDICINE

## 2021-11-06 PROCEDURE — 83880 ASSAY OF NATRIURETIC PEPTIDE: CPT | Performed by: EMERGENCY MEDICINE

## 2021-11-06 PROCEDURE — 250N000011 HC RX IP 250 OP 636: Performed by: EMERGENCY MEDICINE

## 2021-11-06 PROCEDURE — 96366 THER/PROPH/DIAG IV INF ADDON: CPT

## 2021-11-06 PROCEDURE — 82803 BLOOD GASES ANY COMBINATION: CPT

## 2021-11-06 PROCEDURE — 86140 C-REACTIVE PROTEIN: CPT | Performed by: EMERGENCY MEDICINE

## 2021-11-06 PROCEDURE — 84484 ASSAY OF TROPONIN QUANT: CPT | Performed by: EMERGENCY MEDICINE

## 2021-11-06 PROCEDURE — 99285 EMERGENCY DEPT VISIT HI MDM: CPT | Mod: 25

## 2021-11-06 PROCEDURE — 87040 BLOOD CULTURE FOR BACTERIA: CPT | Performed by: EMERGENCY MEDICINE

## 2021-11-06 PROCEDURE — 258N000003 HC RX IP 258 OP 636: Performed by: INTERNAL MEDICINE

## 2021-11-06 PROCEDURE — 84146 ASSAY OF PROLACTIN: CPT | Performed by: EMERGENCY MEDICINE

## 2021-11-06 PROCEDURE — 71260 CT THORAX DX C+: CPT

## 2021-11-06 PROCEDURE — 96367 TX/PROPH/DG ADDL SEQ IV INF: CPT

## 2021-11-06 PROCEDURE — 250N000011 HC RX IP 250 OP 636: Performed by: INTERNAL MEDICINE

## 2021-11-06 PROCEDURE — C9803 HOPD COVID-19 SPEC COLLECT: HCPCS

## 2021-11-06 PROCEDURE — 36415 COLL VENOUS BLD VENIPUNCTURE: CPT | Performed by: INTERNAL MEDICINE

## 2021-11-06 PROCEDURE — 83605 ASSAY OF LACTIC ACID: CPT | Performed by: EMERGENCY MEDICINE

## 2021-11-06 PROCEDURE — 82805 BLOOD GASES W/O2 SATURATION: CPT | Performed by: EMERGENCY MEDICINE

## 2021-11-06 PROCEDURE — 84132 ASSAY OF SERUM POTASSIUM: CPT | Performed by: INTERNAL MEDICINE

## 2021-11-06 PROCEDURE — 250N000013 HC RX MED GY IP 250 OP 250 PS 637: Performed by: INTERNAL MEDICINE

## 2021-11-06 PROCEDURE — 85025 COMPLETE CBC W/AUTO DIFF WBC: CPT | Performed by: EMERGENCY MEDICINE

## 2021-11-06 PROCEDURE — 120N000001 HC R&B MED SURG/OB

## 2021-11-06 PROCEDURE — 250N000013 HC RX MED GY IP 250 OP 250 PS 637: Performed by: EMERGENCY MEDICINE

## 2021-11-06 PROCEDURE — 93005 ELECTROCARDIOGRAM TRACING: CPT

## 2021-11-06 PROCEDURE — 87635 SARS-COV-2 COVID-19 AMP PRB: CPT | Performed by: EMERGENCY MEDICINE

## 2021-11-06 PROCEDURE — 36415 COLL VENOUS BLD VENIPUNCTURE: CPT | Performed by: EMERGENCY MEDICINE

## 2021-11-06 PROCEDURE — 82040 ASSAY OF SERUM ALBUMIN: CPT | Performed by: EMERGENCY MEDICINE

## 2021-11-06 PROCEDURE — 83735 ASSAY OF MAGNESIUM: CPT | Performed by: INTERNAL MEDICINE

## 2021-11-06 PROCEDURE — 96365 THER/PROPH/DIAG IV INF INIT: CPT

## 2021-11-06 PROCEDURE — 85379 FIBRIN DEGRADATION QUANT: CPT | Performed by: EMERGENCY MEDICINE

## 2021-11-06 RX ORDER — IOPAMIDOL 755 MG/ML
500 INJECTION, SOLUTION INTRAVASCULAR ONCE
Status: COMPLETED | OUTPATIENT
Start: 2021-11-06 | End: 2021-11-06

## 2021-11-06 RX ORDER — HYDROMORPHONE HYDROCHLORIDE 1 MG/ML
0.5 INJECTION, SOLUTION INTRAMUSCULAR; INTRAVENOUS; SUBCUTANEOUS ONCE
Status: COMPLETED | OUTPATIENT
Start: 2021-11-06 | End: 2021-11-06

## 2021-11-06 RX ORDER — ONDANSETRON 2 MG/ML
INJECTION INTRAMUSCULAR; INTRAVENOUS
Status: COMPLETED
Start: 2021-11-06 | End: 2021-11-06

## 2021-11-06 RX ORDER — HYDROMORPHONE HCL IN WATER/PF 6 MG/30 ML
0.2 PATIENT CONTROLLED ANALGESIA SYRINGE INTRAVENOUS
Status: DISCONTINUED | OUTPATIENT
Start: 2021-11-06 | End: 2021-01-01 | Stop reason: HOSPADM

## 2021-11-06 RX ORDER — POLYETHYLENE GLYCOL 3350 17 G/17G
17 POWDER, FOR SOLUTION ORAL DAILY PRN
Status: DISCONTINUED | OUTPATIENT
Start: 2021-11-06 | End: 2021-01-01 | Stop reason: HOSPADM

## 2021-11-06 RX ORDER — SODIUM CHLORIDE 9 MG/ML
INJECTION, SOLUTION INTRAVENOUS CONTINUOUS
Status: DISCONTINUED | OUTPATIENT
Start: 2021-11-06 | End: 2021-11-07

## 2021-11-06 RX ORDER — CEFAZOLIN SODIUM 2 G/100ML
2 INJECTION, SOLUTION INTRAVENOUS ONCE
Status: DISCONTINUED | OUTPATIENT
Start: 2021-11-06 | End: 2021-11-06

## 2021-11-06 RX ORDER — ONDANSETRON 2 MG/ML
4 INJECTION INTRAMUSCULAR; INTRAVENOUS EVERY 6 HOURS PRN
Status: DISCONTINUED | OUTPATIENT
Start: 2021-11-06 | End: 2021-01-01 | Stop reason: HOSPADM

## 2021-11-06 RX ORDER — ACETAMINOPHEN 325 MG/1
650 TABLET ORAL EVERY 6 HOURS PRN
Status: DISCONTINUED | OUTPATIENT
Start: 2021-11-06 | End: 2021-01-01 | Stop reason: HOSPADM

## 2021-11-06 RX ORDER — HALOPERIDOL 5 MG/ML
2 INJECTION INTRAMUSCULAR EVERY 6 HOURS PRN
Status: DISCONTINUED | OUTPATIENT
Start: 2021-11-06 | End: 2021-01-01 | Stop reason: HOSPADM

## 2021-11-06 RX ORDER — ACETAMINOPHEN 650 MG/1
650 SUPPOSITORY RECTAL EVERY 6 HOURS PRN
Status: DISCONTINUED | OUTPATIENT
Start: 2021-11-06 | End: 2021-01-01 | Stop reason: HOSPADM

## 2021-11-06 RX ORDER — ONDANSETRON 4 MG/1
4 TABLET, ORALLY DISINTEGRATING ORAL EVERY 6 HOURS PRN
Status: DISCONTINUED | OUTPATIENT
Start: 2021-11-06 | End: 2021-01-01 | Stop reason: HOSPADM

## 2021-11-06 RX ORDER — NALOXONE HYDROCHLORIDE 0.4 MG/ML
0.2 INJECTION, SOLUTION INTRAMUSCULAR; INTRAVENOUS; SUBCUTANEOUS
Status: DISCONTINUED | OUTPATIENT
Start: 2021-11-06 | End: 2021-01-01 | Stop reason: HOSPADM

## 2021-11-06 RX ORDER — LORAZEPAM 0.5 MG/1
0.5 TABLET ORAL EVERY 4 HOURS PRN
Status: DISCONTINUED | OUTPATIENT
Start: 2021-11-06 | End: 2021-01-01 | Stop reason: HOSPADM

## 2021-11-06 RX ORDER — LIDOCAINE 40 MG/G
CREAM TOPICAL
Status: DISCONTINUED | OUTPATIENT
Start: 2021-11-06 | End: 2021-11-09

## 2021-11-06 RX ORDER — NALOXONE HYDROCHLORIDE 0.4 MG/ML
0.4 INJECTION, SOLUTION INTRAMUSCULAR; INTRAVENOUS; SUBCUTANEOUS
Status: DISCONTINUED | OUTPATIENT
Start: 2021-11-06 | End: 2021-01-01 | Stop reason: HOSPADM

## 2021-11-06 RX ORDER — LORAZEPAM 2 MG/ML
0.5 INJECTION INTRAMUSCULAR ONCE
Status: COMPLETED | OUTPATIENT
Start: 2021-11-06 | End: 2021-11-06

## 2021-11-06 RX ORDER — OLANZAPINE 5 MG/1
5 TABLET, ORALLY DISINTEGRATING ORAL AT BEDTIME
Status: DISCONTINUED | OUTPATIENT
Start: 2021-11-06 | End: 2021-11-08

## 2021-11-06 RX ORDER — OXYCODONE HYDROCHLORIDE 5 MG/1
5 TABLET ORAL EVERY 4 HOURS PRN
Status: DISCONTINUED | OUTPATIENT
Start: 2021-11-06 | End: 2021-01-01 | Stop reason: HOSPADM

## 2021-11-06 RX ADMIN — TAZOBACTAM SODIUM AND PIPERACILLIN SODIUM 3.38 G: 375; 3 INJECTION, SOLUTION INTRAVENOUS at 22:04

## 2021-11-06 RX ADMIN — OLANZAPINE 5 MG: 5 TABLET, ORALLY DISINTEGRATING ORAL at 22:03

## 2021-11-06 RX ADMIN — MICONAZOLE NITRATE: 20 POWDER TOPICAL at 23:18

## 2021-11-06 RX ADMIN — HYDROMORPHONE HYDROCHLORIDE 0.5 MG: 1 INJECTION, SOLUTION INTRAMUSCULAR; INTRAVENOUS; SUBCUTANEOUS at 16:06

## 2021-11-06 RX ADMIN — SODIUM CHLORIDE: 9 INJECTION, SOLUTION INTRAVENOUS at 19:35

## 2021-11-06 RX ADMIN — LORAZEPAM 0.5 MG: 2 INJECTION INTRAMUSCULAR; INTRAVENOUS at 13:52

## 2021-11-06 RX ADMIN — TAZOBACTAM SODIUM AND PIPERACILLIN SODIUM 4.5 G: 500; 4 INJECTION, SOLUTION INTRAVENOUS at 16:06

## 2021-11-06 RX ADMIN — ENOXAPARIN SODIUM 40 MG: 40 INJECTION SUBCUTANEOUS at 19:45

## 2021-11-06 RX ADMIN — ONDANSETRON 4 MG: 2 INJECTION INTRAMUSCULAR; INTRAVENOUS at 16:06

## 2021-11-06 RX ADMIN — IOPAMIDOL 100 ML: 755 INJECTION, SOLUTION INTRAVENOUS at 16:31

## 2021-11-06 RX ADMIN — VANCOMYCIN HYDROCHLORIDE 2500 MG: 1 INJECTION, POWDER, LYOPHILIZED, FOR SOLUTION INTRAVENOUS at 17:01

## 2021-11-06 ASSESSMENT — ENCOUNTER SYMPTOMS: SHORTNESS OF BREATH: 0

## 2021-11-06 ASSESSMENT — ACTIVITIES OF DAILY LIVING (ADL)
ADLS_ACUITY_SCORE: 12
ADLS_ACUITY_SCORE: 27
ADLS_ACUITY_SCORE: 15
ADLS_ACUITY_SCORE: 12
ADLS_ACUITY_SCORE: 12
ADLS_ACUITY_SCORE: 15
ADLS_ACUITY_SCORE: 15
ADLS_ACUITY_SCORE: 12

## 2021-11-06 NOTE — PHARMACY-ADMISSION MEDICATION HISTORY
Admission medication history interview status for this patient is complete. See [facility] admission navigator for allergy information, prior to admission medications and immunization status.     Medication history interview done, indicate source(s): Patient  Medication history resources (including written lists, pill bottles, clinic record): SureScripts and Care Everywhere  Pharmacy: [facility] [place]. [facility] for discharge    Changes made to PTA medication list:  Added: -  Changed: -  Reported as Not Taking: -  Removed: -    Actions taken by pharmacist (provider contacted, etc): Spoke with patient about home med list.     Additional medication history information: Patient does not take any OTC or Rx medications aside from albuterol neb PRN.    Medication reconciliation/reorder completed by provider prior to medication history?  N   (Y/N)       Prior to Admission medications    Medication Sig Last Dose Taking? Auth Provider   albuterol (PROVENTIL) (2.5 MG/3ML) 0.083% neb solution Take 1 vial (2.5 mg) by nebulization every 6 hours as needed for shortness of breath / dyspnea or wheezing More than a month at Unknown time Yes Sandra Skinner Ra, APRN CNP

## 2021-11-06 NOTE — ED NOTES
RECEIVING UNIT ED HANDOFF REVIEW    Above ED Nurse Handoff Report was reviewed: Yes  Reviewed by: Courtney Pisano RN on November 6, 2021 at 6:03 PM   [facility]  ED Nurse Handoff Report    Cristy Bailey is a 76 year old female   ED Chief complaint: Fall  . ED Diagnosis:   Final diagnoses:   Cellulitis of right hip   Generalized muscle weakness   Unable to ambulate     Allergies:   Allergies   Allergen Reactions     No Known Drug Allergies        Code Status: Full Code  Activity level - Baseline/Home:  Total Care. Activity Level - Current:   Total Care. Lift room needed: Yes. Bariatric: Yes   Needed: No   Isolation: No. Infection: Not Applicable.     Vital Signs:   Vitals:    11/06/21 1550 11/06/21 1600 11/06/21 1610 11/06/21 1615   BP:    92/55   Pulse:   97    Resp:       Temp:       TempSrc:       SpO2: 92% 93% 95% 91%   Weight:           Cardiac Rhythm:  ,   Cardiac  Cardiac Rhythm: Normal sinus rhythm  Pain level:    Patient confused: No. Patient Falls Risk: Yes.   Elimination Status: Has voided   Patient Report - Initial Complaint: fall, failure to thrive, nasuea. Focused Assessment: Cristy Bailey is a 76 year old female with history of hypertension, hyperlipidemia, DVT, and failure to thrive who presents after a fall. The patient slid out of her reclining chair this morning and her significant other was unable to lift her off of the floor, prompting him to call EMS. He reports that the patient has not taken her medications and has not left the house in 3 years. She uses a wheelchair to move around her home. Denies shortness of breath and chest pain. Patient has leg swelling at baseline.  Review of Systems   Respiratory: Negative for shortness of breath.    Cardiovascular: Positive for leg swelling (at baseline). Negative for chest pain.   All other systems reviewed and are negative.     Tests Performed: labs, CT. Abnormal Results:   CT Chest (PE) Abdomen Pelvis w Contrast     (Results Pending)     Labs Ordered and Resulted from Time of ED Arrival to Time of ED Departure   ISTAT GASES LACTATE VENOUS POCT - Abnormal       Result Value    Lactic Acid POCT 2.4 (*)     Bicarbonate Venous POCT 30 (*)     O2 Sat, Venous POCT 36 (*)     pCO2V Venous POCT 44      pH Venous POCT 7.44 (*)     pO2 Venous POCT 21 (*)    BLOOD GAS VENOUS WITH OXYHEMOGLOBIN - Abnormal    pH Venous 7.40      pCO2 Venous 45      pO2 Venous 22 (*)     Bicarbonate Venous 28      FIO2 0      Oxyhemoglobin Venous 42 (*)     Base Excess/Deficit (+/-) 2.1 (*)    COMPREHENSIVE METABOLIC PANEL - Abnormal    Sodium 137      Potassium 5.1      Chloride 108      Carbon Dioxide (CO2) 26      Anion Gap 3      Urea Nitrogen 12      Creatinine 0.57      Calcium 7.1 (*)     Glucose 119 (*)     Alkaline Phosphatase 94      AST 55 (*)     ALT 32      Protein Total 6.4 (*)     Albumin 2.1 (*)     Bilirubin Total 1.1      GFR Estimate >90     D DIMER QUANTITATIVE - Abnormal    D-Dimer Quantitative 1.76 (*)    CRP INFLAMMATION - Abnormal    CRP Inflammation 97.9 (*)    CBC WITH PLATELETS AND DIFFERENTIAL - Abnormal    WBC Count 22.2 (*)     RBC Count 4.73      Hemoglobin 13.8      Hematocrit 44.2      MCV 93      MCH 29.2      MCHC 31.2 (*)     RDW 13.8      Platelet Count 285      % Neutrophils 95      % Lymphocytes 2      % Monocytes 2      % Eosinophils 0      % Basophils 0      % Immature Granulocytes 1      NRBCs per 100 WBC 0      Absolute Neutrophils 21.1 (*)     Absolute Lymphocytes 0.4 (*)     Absolute Monocytes 0.4      Absolute Eosinophils 0.0      Absolute Basophils 0.0      Absolute Immature Granulocytes 0.3 (*)     Absolute NRBCs 0.0     CBC WITH PLATELETS AND DIFFERENTIAL - Abnormal    WBC Count 30.8 (*)     RBC Count 4.63      Hemoglobin 13.8      Hematocrit 42.9      MCV 93      MCH 29.8      MCHC 32.2      RDW 14.2      Platelet Count 268      % Neutrophils 95      % Lymphocytes 1      % Monocytes 2      % Eosinophils 0       % Basophils 0      % Immature Granulocytes 2      NRBCs per 100 WBC 0      Absolute Neutrophils 29.0 (*)     Absolute Lymphocytes 0.4 (*)     Absolute Monocytes 0.8      Absolute Eosinophils 0.0      Absolute Basophils 0.1      Absolute Immature Granulocytes 0.6 (*)     Absolute NRBCs 0.0     COVID-19 VIRUS (CORONAVIRUS) BY PCR - Normal    SARS CoV2 PCR Negative     LACTIC ACID WHOLE BLOOD - Normal    Lactic Acid 1.8     TROPONIN I - Normal    Troponin I <0.015     CK TOTAL - Normal         NT PROBNP INPATIENT - Normal    N terminal Pro BNP Inpatient 718     PROLACTIN   TYPE AND SCREEN, ADULT    ABO/RH(D) O POS      Antibody Screen Negative      SPECIMEN EXPIRATION DATE [id number]     BLOOD CULTURE   BLOOD CULTURE   BLOOD CULTURE   ABO/RH TYPE AND SCREEN   .   Treatments provided: see MAR  Family Comments: SO aware of admit  OBS brochure/video discussed/provided to patient:  N/A  ED Medications:   Medications   sodium chloride (PF) 0.9% PF flush 3 mL (has no administration in time range)   sodium chloride (PF) 0.9% PF flush 3 mL (3 mLs Intracatheter Given 11/6/21 1352)   OLANZapine zydis (zyPREXA) ODT tab 5 mg (has no administration in time range)   piperacillin-tazobactam (ZOSYN) intermittent infusion 4.5 g (4.5 g Intravenous New Bag 11/6/21 1606)   vancomycin (VANCOCIN) 2,500 mg in sodium chloride 0.9 % 500 mL intermittent infusion (has no administration in time range)   LORazepam (ATIVAN) injection 0.5 mg (0.5 mg Intravenous Given 11/6/21 1352)   HYDROmorphone (PF) (DILAUDID) injection 0.5 mg (0.5 mg Intravenous Given 11/6/21 1606)   ondansetron (ZOFRAN) 2 MG/ML injection (4 mg  Given 11/6/21 1606)     Drips infusing:  Yes  For the majority of the shift, the patient's behavior Green. Interventions performed were n/a.    Sepsis treatment initiated: No     Patient tested for COVID 19 prior to admission: YES    ED Nurse Name/Phone Number: Umesh Hernandez RN,   4:25 PM

## 2021-11-06 NOTE — ED NOTES
Bed: ED33  Expected date: 11/6/21  Expected time: 11:51 AM  Means of arrival:   Comments:  Sheyla Thomas

## 2021-11-06 NOTE — H&P
[facility]  Hospitalist Admission Note  Name: Cristy Bailey    MRN: [id number]  YOB: 1945    Age: 76 year old  Date of admission: 11/6/2021  Primary care provider: Manoj Daley            Assessment and Plan:   Cristy Bailey is a 76 year old female longstanding history of hypertension, morbid obesity, prior hip surgery, dyslipidemia, hypertension, reportedly lives in a mobile home setting and moves around utilizing a wheelchair and reports they can earlier from patient's neighbors as per EMS report to ER that patient has not left the house of approximately 3 years already.  EMS personnel was activated earlier after apparently she had a fall event after sliding off from her recliner.  Her significant other attempted to help her but was not able to do so that led to EMS activation.     1.  SIRS versus early sepsis with lactic acidosis, significant leukocytosis, tachycardia 104 bpm with underlying right lower extremity cellulitis, ruling out abscess  2.  Fall event with severe physical deconditioning with concomitant failure to thrive  3.  Morbid obesity  4.  History of hypertension  5.  History of dyslipidemia  6.  Routine COVID-19 screen negative    Admit as inpatient.  Ms. Muniz remain high risk for clinical deterioration  Agree with current antibiotic selection.  Covering with broad-spectrum antibiotics.  We will continue Zosyn and vancomycin as started earlier in the emergency room.  If with significant improvement, cultures are permitting then we can further deescalate in the next coming days.  Pending procalcitonin levels  Multiple culture sent earlier.  We will request urine analysis for completion  IV fluid support for now.  As needed anxiolytics, available pain regimen to optimize pain control.  As needed Haldol if with episodes of severe agitation or being combative  Physical therapy, occupational therapy, social service evaluation requested  Palliative care input  for goals of cares and POLST assistance requested  We will request nutrition and dietary service for evaluation as she remain at risk for malnutrition  Wound care input and evaluation requested given concerns of wounds and possible pressure injury at gluteal area  -Ruling out any abscess formation, will await imaging done in the emergency room.  -We will get her more information and data if she is still on anticoagulation as chart review showing prior history of VTE but last known clinic visit was almost 2 years ago    Addendum:  Patient mentioned that she has not been on any anticoagulations for years  CT of the abdomen, chest, pelvis showed no clear evidence of PE, possible consolidation, no obvious collection or abscess at pelvic area    Code status: DNR/DNI as expressed by the patient  Admit to inpatient  Prophylaxis: Lovenox, high risk for VTE  Disposition: To be determined but anticipating that Cristy will be needing at least 3 inpatient hospitalization days.          Chief Complaint:   Fall  Failure to thrive       Source of Information:   Patient with poor reliability  Discussion with ED physician  Review of E chart records         History of Present Illness:   Cristy Bailey is a 76 year old female longstanding history of hypertension, morbid obesity, prior hip surgery, dyslipidemia, hypertension, reportedly lives in a mobile home setting and moves around utilizing a wheelchair and reports they can earlier from patient's neighbors as per EMS report to ER that patient has not left the house of approximately 3 years already.  EMS personnel was activated earlier after apparently she had a fall event after sliding off from her recliner.  Her significant other attempted to help her but was not able to do so that led to EMS activation.    There was no report of any fevers, chest pain, shortness of breath, vomiting, diarrhea, bleeding tendencies, chills, sore throat, urinary symptomatology nor diarrhea.    Upon  initial presentation she demonstrated stable hemodynamics, afebrile, but further work-up revealed severe leukocytosis of more than 30K, concerns for right thigh and hip cellulitis, cannot totally rule out infected hip, possible pressure injury, been out of her house approximately, and eventually was provided with intravenous antibiotics with Zosyn and vancomycin.  She has a elevated D-dimer, initially minimally elevated lactic acid that improve after repeat levels.  She was very anxious in the emergency room that was requiring anxiolytics and hydromorphone.  She was sent for a CT scan imaging for further studies to rule out PE given elevated D-dimer, prior history of swelling of DVT.  Unsure if she is still on anticoagulation and has not been in the clinic for quite some time now.                Past Medical History:     Past Medical History:   Diagnosis Date     Arthritis     hip     HTN (hypertension) 1/2010      Leg weakness 3/24/2015     Lyme disease 1980'S AND 2004    lYME DZ LIKE SXS RESPONDED TO ABX             Past Surgical History:     Past Surgical History:   Procedure Laterality Date     ARTHROPLASTY HIP Right 7/30/2018    Procedure: ARTHROPLASTY HIP;  Right total hip arthroplasty;  Surgeon: Bry Garcia MD;  Location: [facility] OR     ARTHROPLASTY HIP Left 2/12/2019    Procedure: Left total hip arthroplasty ([employer] 60 mm acetabulum with 2 screws; #15 standard neck Synergy stem; +0 neck 36 mm head) (extra difficult case because of her high BMI and deep subcutaneous fatty layer resulting in the operative time at least twice as long from typical operative time);  Surgeon: Chavo Rodriguez MD;  Location: [facility] OR     BIOPSY OF UTERUS LINING  3/2010    negative.      C C-SEC ONLY,PREV C-SEC      c-sec x 3      COLONOSCOPY  2/21/10    benign findings. advised 10 yr f/u.      INCISION AND DRAINAGE HIP, COMBINED Right 8/29/2018    Procedure: COMBINED INCISION AND DRAINAGE HIP;  Incision and  debridement, right hip ;  Surgeon: Bry Garcia MD;  Location: [facility] OR     ORTHOPEDIC SURGERY Right 2018    Right hip I & D 18, Right JENAE, DOS 2018, Dr. Garcia. [facility] [place]             Social History:     Social History     Tobacco Use     Smoking status: Former Smoker     Quit date: 1984     Years since quittin.2     Smokeless tobacco: Never Used   Substance Use Topics     Alcohol use: No             Family History:   Family history was fully reviewed and non-contributory in this case.         Allergies:     Allergies   Allergen Reactions     No Known Drug Allergies              Medications:     Prior to Admission medications    Medication Sig Last Dose Taking? Auth Provider   albuterol (PROVENTIL) (2.5 MG/3ML) 0.083% neb solution Take 1 vial (2.5 mg) by nebulization every 6 hours as needed for shortness of breath / dyspnea or wheezing More than a month at Unknown time Yes Sandra Skinner Ra, APRN CNP             Review of Systems:   A Comprehensive greater than 10 system review of systems was carried out.  Pertinent positives and negatives are noted above.  Otherwise negative for contributory information.           Physical Exam:   Blood pressure 92/55, pulse 97, temperature 99.1  F (37.3  C), temperature source Temporal, resp. rate 22, weight (!) 163 kg (359 lb 5.6 oz), SpO2 91 %, not currently breastfeeding.  Wt Readings from Last 1 Encounters:   21 (!) 163 kg (359 lb 5.6 oz)     Exam:  GENERAL: No apparent distress. Awake, alert, and fully oriented.  Morbidly obese  HEENT: Normocephalic, atraumatic. Extraocular movements intact.  CARDIOVASCULAR: Regular rate and rhythm without murmurs or rubs. No JVD, bilateral lower extremity edema  PULMONARY: Clear to auscultation, no wheezes, crackles  ABDOMINAL: Soft, non-tender, non-distended. Bowel sounds normoactive. No hepatosplenomegaly.  EXTREMITIES: No cyanosis or clubbing.  Erythema, right thigh, lower extremity,  warm to touch  See photograph for other details        NEUROLOGICAL: CN 2-12 grossly intact, awake and alert x3, spontaneous and coherent speech. no focal neurological deficits.  DERMATOLOGICAL: Multiple skin excoriations on upper and lower extremities  Psych: not agitation, not combative, pleasant mood         Data:   EKG: Normal sinus rhythm 98 bpm    Imaging:  Results for orders placed or performed during the hospital encounter of 09/06/19   US Lower Extremity Venous Duplex Left    Narrative    VENOUS ULTRASOUND LEFT LEG  9/6/2019 12:02 PM     HISTORY: Acute thromboembolism of deep veins of left lower extremity  (H); Long term current use of anticoagulant therapy    COMPARISON: None.    FINDINGS:  Examination of the deep veins with graded compression and  color flow Doppler with spectral wave form analysis was performed.   The left posterior tibial and peroneal veins are not adequately  visualized due to body habitus. Visualized veins of the left lower  extremity are patent without thrombus.      Impression    IMPRESSION: No evidence of deep venous thrombosis.    AMY BEE MD       Labs:  No results for input(s): CULT in the last 168 hours.  Recent Labs   Lab 11/06/21  1251   PH 7.44*     Recent Labs   Lab 11/06/21  1518 11/06/21  1254   WBC 30.8* 22.2*   HGB 13.8 13.8   HCT 42.9 44.2   MCV 93 93    285     Recent Labs   Lab 11/06/21  1254      POTASSIUM 5.1   CHLORIDE 108   CO2 26   ANIONGAP 3   *   BUN 12   CR 0.57   GFRESTIMATED >90   ASHIA 7.1*   PROTTOTAL 6.4*   ALBUMIN 2.1*   BILITOTAL 1.1   ALKPHOS 94   AST 55*   ALT 32     Recent Labs   Lab 11/06/21  1254        Recent Labs   Lab 11/06/21  1253   DD 1.76*     Recent Labs   Lab 11/06/21  1254   CRP 97.9*     Recent Labs   Lab 11/06/21  1254   *     No results for input(s): INR in the last 168 hours.  No results for input(s): LIPASE in the last 168 hours.  No results for input(s): CHOL, HDL, LDL, TRIG, CHOLHDLRATIO in  the last 168 hours.  Recent Labs   Lab 11/06/21  1254   TROPONIN <0.015     No results for input(s): TSH in the last 168 hours.  No results for input(s): COLOR, APPEARANCE, URINEGLC, URINEBILI, URINEKETONE, SG, UBLD, URINEPH, PROTEIN, UROBILINOGEN, NITRITE, LEUKEST, RBCU, WBCU in the last 168 hours.  As a pending CT scan of the chest, abdomen and pelvis

## 2021-11-07 ENCOUNTER — APPOINTMENT (OUTPATIENT)
Dept: PHYSICAL THERAPY | Facility: CLINIC | Age: 76
DRG: 871 | End: 2021-11-07
Attending: INTERNAL MEDICINE
Payer: COMMERCIAL

## 2021-11-07 ENCOUNTER — APPOINTMENT (OUTPATIENT)
Dept: OCCUPATIONAL THERAPY | Facility: CLINIC | Age: 76
DRG: 871 | End: 2021-11-07
Attending: INTERNAL MEDICINE
Payer: COMMERCIAL

## 2021-11-07 LAB
ALBUMIN SERPL-MCNC: 2.1 G/DL (ref 3.4–5)
ALBUMIN UR-MCNC: 30 MG/DL
ALP SERPL-CCNC: 91 U/L (ref 40–150)
ALT SERPL W P-5'-P-CCNC: 29 U/L (ref 0–50)
ANION GAP SERPL CALCULATED.3IONS-SCNC: 6 MMOL/L (ref 3–14)
APPEARANCE UR: CLEAR
AST SERPL W P-5'-P-CCNC: 35 U/L (ref 0–45)
BASOPHILS # BLD AUTO: 0.1 10E3/UL (ref 0–0.2)
BASOPHILS NFR BLD AUTO: 0 %
BILIRUB SERPL-MCNC: 0.7 MG/DL (ref 0.2–1.3)
BILIRUB UR QL STRIP: NEGATIVE
BUN SERPL-MCNC: 17 MG/DL (ref 7–30)
CALCIUM SERPL-MCNC: 7.5 MG/DL (ref 8.5–10.1)
CHLORIDE BLD-SCNC: 107 MMOL/L (ref 94–109)
CO2 SERPL-SCNC: 25 MMOL/L (ref 20–32)
COLOR UR AUTO: YELLOW
CREAT SERPL-MCNC: 0.8 MG/DL (ref 0.52–1.04)
EOSINOPHIL # BLD AUTO: 0 10E3/UL (ref 0–0.7)
EOSINOPHIL NFR BLD AUTO: 0 %
ERYTHROCYTE [DISTWIDTH] IN BLOOD BY AUTOMATED COUNT: 14.6 % (ref 10–15)
GFR SERPL CREATININE-BSD FRML MDRD: 73 ML/MIN/1.73M2
GLUCOSE BLD-MCNC: 108 MG/DL (ref 70–99)
GLUCOSE UR STRIP-MCNC: NEGATIVE MG/DL
HCT VFR BLD AUTO: 39.9 % (ref 35–47)
HGB BLD-MCNC: 12.6 G/DL (ref 11.7–15.7)
HGB UR QL STRIP: NEGATIVE
IMM GRANULOCYTES # BLD: 0.3 10E3/UL
IMM GRANULOCYTES NFR BLD: 1 %
KETONES UR STRIP-MCNC: 10 MG/DL
LEUKOCYTE ESTERASE UR QL STRIP: NEGATIVE
LYMPHOCYTES # BLD AUTO: 0.8 10E3/UL (ref 0.8–5.3)
LYMPHOCYTES NFR BLD AUTO: 3 %
MAGNESIUM SERPL-MCNC: 1.9 MG/DL (ref 1.6–2.3)
MCH RBC QN AUTO: 29.9 PG (ref 26.5–33)
MCHC RBC AUTO-ENTMCNC: 31.6 G/DL (ref 31.5–36.5)
MCV RBC AUTO: 95 FL (ref 78–100)
MONOCYTES # BLD AUTO: 0.7 10E3/UL (ref 0–1.3)
MONOCYTES NFR BLD AUTO: 3 %
MUCOUS THREADS #/AREA URNS LPF: PRESENT /LPF
NEUTROPHILS # BLD AUTO: 22.7 10E3/UL (ref 1.6–8.3)
NEUTROPHILS NFR BLD AUTO: 93 %
NITRATE UR QL: NEGATIVE
NRBC # BLD AUTO: 0 10E3/UL
NRBC BLD AUTO-RTO: 0 /100
PH UR STRIP: 6 [PH] (ref 5–7)
PLATELET # BLD AUTO: 251 10E3/UL (ref 150–450)
POTASSIUM BLD-SCNC: 4 MMOL/L (ref 3.4–5.3)
PROCALCITONIN SERPL-MCNC: 4.48 NG/ML
PROT SERPL-MCNC: 6.2 G/DL (ref 6.8–8.8)
RBC # BLD AUTO: 4.22 10E6/UL (ref 3.8–5.2)
RBC URINE: 4 /HPF
SODIUM SERPL-SCNC: 138 MMOL/L (ref 133–144)
SP GR UR STRIP: 1.01 (ref 1–1.03)
SQUAMOUS EPITHELIAL: 3 /HPF
UROBILINOGEN UR STRIP-MCNC: NORMAL MG/DL
VANCOMYCIN SERPL-MCNC: 17.1 MG/L
WBC # BLD AUTO: 24.5 10E3/UL (ref 4–11)
WBC URINE: 2 /HPF

## 2021-11-07 PROCEDURE — 81001 URINALYSIS AUTO W/SCOPE: CPT | Performed by: INTERNAL MEDICINE

## 2021-11-07 PROCEDURE — 250N000013 HC RX MED GY IP 250 OP 250 PS 637: Performed by: EMERGENCY MEDICINE

## 2021-11-07 PROCEDURE — 97530 THERAPEUTIC ACTIVITIES: CPT | Mod: GP

## 2021-11-07 PROCEDURE — 97166 OT EVAL MOD COMPLEX 45 MIN: CPT | Mod: GO

## 2021-11-07 PROCEDURE — 120N000001 HC R&B MED SURG/OB

## 2021-11-07 PROCEDURE — 36415 COLL VENOUS BLD VENIPUNCTURE: CPT | Performed by: INTERNAL MEDICINE

## 2021-11-07 PROCEDURE — 80202 ASSAY OF VANCOMYCIN: CPT | Performed by: INTERNAL MEDICINE

## 2021-11-07 PROCEDURE — 97530 THERAPEUTIC ACTIVITIES: CPT | Mod: GO

## 2021-11-07 PROCEDURE — 250N000011 HC RX IP 250 OP 636: Performed by: INTERNAL MEDICINE

## 2021-11-07 PROCEDURE — 85025 COMPLETE CBC W/AUTO DIFF WBC: CPT | Performed by: INTERNAL MEDICINE

## 2021-11-07 PROCEDURE — 250N000013 HC RX MED GY IP 250 OP 250 PS 637: Performed by: INTERNAL MEDICINE

## 2021-11-07 PROCEDURE — 84145 PROCALCITONIN (PCT): CPT | Performed by: INTERNAL MEDICINE

## 2021-11-07 PROCEDURE — 82040 ASSAY OF SERUM ALBUMIN: CPT | Performed by: INTERNAL MEDICINE

## 2021-11-07 PROCEDURE — 83735 ASSAY OF MAGNESIUM: CPT | Performed by: INTERNAL MEDICINE

## 2021-11-07 PROCEDURE — 258N000003 HC RX IP 258 OP 636: Performed by: INTERNAL MEDICINE

## 2021-11-07 PROCEDURE — 99233 SBSQ HOSP IP/OBS HIGH 50: CPT | Performed by: INTERNAL MEDICINE

## 2021-11-07 PROCEDURE — 97162 PT EVAL MOD COMPLEX 30 MIN: CPT | Mod: GP

## 2021-11-07 RX ORDER — ALBUTEROL SULFATE 0.83 MG/ML
2.5 SOLUTION RESPIRATORY (INHALATION) EVERY 6 HOURS PRN
Status: DISCONTINUED | OUTPATIENT
Start: 2021-11-07 | End: 2021-01-01 | Stop reason: HOSPADM

## 2021-11-07 RX ORDER — LIDOCAINE 40 MG/G
CREAM TOPICAL
Status: DISCONTINUED | OUTPATIENT
Start: 2021-11-07 | End: 2021-01-01 | Stop reason: HOSPADM

## 2021-11-07 RX ADMIN — MICONAZOLE NITRATE: 20 POWDER TOPICAL at 20:40

## 2021-11-07 RX ADMIN — TAZOBACTAM SODIUM AND PIPERACILLIN SODIUM 3.38 G: 375; 3 INJECTION, SOLUTION INTRAVENOUS at 15:40

## 2021-11-07 RX ADMIN — TAZOBACTAM SODIUM AND PIPERACILLIN SODIUM 3.38 G: 375; 3 INJECTION, SOLUTION INTRAVENOUS at 03:30

## 2021-11-07 RX ADMIN — SODIUM CHLORIDE: 9 INJECTION, SOLUTION INTRAVENOUS at 07:56

## 2021-11-07 RX ADMIN — TAZOBACTAM SODIUM AND PIPERACILLIN SODIUM 3.38 G: 375; 3 INJECTION, SOLUTION INTRAVENOUS at 21:50

## 2021-11-07 RX ADMIN — OLANZAPINE 5 MG: 5 TABLET, ORALLY DISINTEGRATING ORAL at 21:50

## 2021-11-07 RX ADMIN — VANCOMYCIN HYDROCHLORIDE 1250 MG: 10 INJECTION, POWDER, LYOPHILIZED, FOR SOLUTION INTRAVENOUS at 17:32

## 2021-11-07 RX ADMIN — ACETAMINOPHEN 650 MG: 325 TABLET, FILM COATED ORAL at 09:56

## 2021-11-07 RX ADMIN — MICONAZOLE NITRATE: 20 POWDER TOPICAL at 07:56

## 2021-11-07 RX ADMIN — TAZOBACTAM SODIUM AND PIPERACILLIN SODIUM 3.38 G: 375; 3 INJECTION, SOLUTION INTRAVENOUS at 09:56

## 2021-11-07 RX ADMIN — VANCOMYCIN HYDROCHLORIDE 1250 MG: 10 INJECTION, POWDER, LYOPHILIZED, FOR SOLUTION INTRAVENOUS at 04:00

## 2021-11-07 RX ADMIN — ENOXAPARIN SODIUM 40 MG: 40 INJECTION SUBCUTANEOUS at 17:33

## 2021-11-07 RX ADMIN — OXYCODONE HYDROCHLORIDE 5 MG: 5 TABLET ORAL at 09:56

## 2021-11-07 ASSESSMENT — ACTIVITIES OF DAILY LIVING (ADL)
ADLS_ACUITY_SCORE: 27
ADLS_ACUITY_SCORE: 31
ADLS_ACUITY_SCORE: 27
ADLS_ACUITY_SCORE: 31
ADLS_ACUITY_SCORE: 27
ADLS_ACUITY_SCORE: 31
ADLS_ACUITY_SCORE: 27

## 2021-11-07 NOTE — CONSULTS
NUTRITION ASSESSMENT      REASON FOR NUTRITION CONSULT:  Provider Order  -  Malnutrition screen/evaluation      ASSESSMENT:  Unable to complete nutrition assessment due to RD off site and/or patient unavailable.       FOLLOW UP:   Will follow up when patient and/or RD available in the next 1-2 days.     Thi Conde RD, LD  ICU/3rd Floor RD Pager: [phone]  All Other Floors: [phone]  Weekend RD Pager: [phone]

## 2021-11-07 NOTE — PHARMACY-VANCOMYCIN DOSING SERVICE
"Pharmacy Vancomycin Initial Note  Date of Service 2021  Patient's  1945  76 year old, female    Indication: Skin and Soft Tissue Infection    Current estimated CrCl = CrCl cannot be calculated (Unknown ideal weight.).    Creatinine for last 3 days  2021: 12:54 PM Creatinine 0.57 mg/dL    Recent Vancomycin Level(s) for last 3 days  No results found for requested labs within last 72 hours.      Vancomycin IV Administrations (past 72 hours)                   vancomycin (VANCOCIN) 2,500 mg in sodium chloride 0.9 % 500 mL intermittent infusion (mg) 2,500 mg New Bag 21 170                Nephrotoxins and other renal medications (From now, onward)    Start     Dose/Rate Route Frequency Ordered Stop    21 0400  vancomycin 1250 mg in 0.9% NaCl 250 mL intermittent infusion 1,250 mg      1,250 mg  over 90 Minutes Intravenous EVERY 12 HOURS 21 19421 220  piperacillin-tazobactam (ZOSYN) infusion 3.375 g     Note to Pharmacy: For [facility], [facility] and [facility]: For Zosyn-naive patients, use the \"Zosyn initial dose + extended infusion\" order panel.    3.375 g  100 mL/hr over 30 Minutes Intravenous EVERY 6 HOURS 21 191            Contrast Orders - past 72 hours (72h ago, onward)    Start     Dose/Rate Route Frequency Ordered Stop    21 1635  iopamidol (ISOVUE-370) solution 500 mL      500 mL Intravenous ONCE 21 1630 21 1631        Loading dose: 2500 mg x1 in the ED at 1701 on 2021  Regimen: 1250 mg IV every 12 hours.  Start time: 04:00 on 2021  Exposure target: AUC24 (range)400-600 mg/L.hr   AUC24,ss: 490 mg/L.hr  Probability of AUC24 > 400: 65 %  Ctrough,ss: 13 mg/L  Probability of Ctrough,ss > 20: 30 %  Probability of nephrotoxicity (Lodise TOMÁS ): 8 %        Plan:  1. Start vancomycin  1250 mg IV q12h.   2. Vancomycin monitoring method: AUC  3. Vancomycin therapeutic monitoring goal: 400-600 mg*h/L  4. Pharmacy will check vancomycin levels as " appropriate in 1-3 Days.    5. Serum creatinine levels will be ordered daily for the first week of therapy and at least twice weekly for subsequent weeks.      Toñito Gaming, RP

## 2021-11-07 NOTE — PLAN OF CARE
Vss. Afebrile. Lungs diminished-room air mid 90s. +gas, Lbm 11/05/21. Voiding via Purewick-patent. Ivf infusing. Cms-2+ generalized edema, 3+ ankles & 4+ feet. Skin has bruising, rash, scabs, intact blisters, and dry/cracked/peeling feet & upper arms. Excoriation to abdominal folds & under breasts-medicated powder applied. Zosyn & Vancomycin for antibiotic therapy. Tolerating some elevation with pillows & repositioning. Pulsate mattress in place. WOC & Nutrition consults placed. No other significant issues noted overnight.

## 2021-11-07 NOTE — PLAN OF CARE
"Alert, oriented x4. \"afraid.\" crying often. Tearful. Emotion not always matching what is going in the room at that given situation. \"I haven't let the house for 3 years.\" home bound. Lives with friend.   Vitals stable.   Obese. Severe edema +4 to feet, ankles, legs, thighs, bilateral buttocks. Cracked skin- very dry and flaky to bilateral legs and feet. Lotion applied. Unable to view coccyx at this time as bed being ordered- keli bed to replace the pulsate mattress. Abd folds, thigh folds, wrists folds and under breasts very red and excoriated. Skin care completed.   Pain to left calf. Oxycodone and Tylenol for pain.   Redness and warmth to left leg, skin scratch marks to right thigh. Many open small areas on right thigh.   Food and fluids well.   UA collected via clean purewick.   Iv fluids and antibiotics.   Sat on edge of bed with 3 assist and lots of encouragement as \"scared to fall.\"   1430 outlined redness to left leg all the way up left thigh. Low urine output this shift- 50cc this shift. Let Dr. CUBA know- will monitor.   1730 unable to place IV per flying squad RN 3 pokes. Ultrasound placed IV to left arm. Will need vascular to place a midline IV in the AM. Continues on zosyn and vancomycin. Redness remains to left leg and right thigh. baripulsate mattress. Forgetful. Pain improved. Oliguria on urine. Limited urine. Just turned to side to check coccyx- no open areas on coccyx.   "

## 2021-11-07 NOTE — PHARMACY-VANCOMYCIN DOSING SERVICE
"Pharmacy Vancomycin Note  Date of Service 2021  Patient's  1945   76 year old, female    Indication: Skin and Soft Tissue Infection  Day of Therapy: 2   Current vancomycin regimen:  1250 mg IV q12h  Current vancomycin monitoring method: AUC  Current vancomycin therapeutic monitoring goal: 400-600 mg*h/L    Current estimated CrCl = Estimated Creatinine Clearance: 99.5 mL/min (based on SCr of 0.8 mg/dL).    Creatinine for last 3 days  2021: 12:54 PM Creatinine 0.57 mg/dL;  1:57 PM Creatinine 0.67 mg/dL  2021:  5:52 AM Creatinine 0.80 mg/dL    Recent Vancomycin Levels (past 3 days)  2021:  2:14 PM Vancomycin 17.1 mg/L    Vancomycin IV Administrations (past 72 hours)                   vancomycin 1250 mg in 0.9% NaCl 250 mL intermittent infusion 1,250 mg (mg) 1,250 mg New Bag 21 0400    vancomycin (VANCOCIN) 2,500 mg in sodium chloride 0.9 % 500 mL intermittent infusion (mg) 2,500 mg New Bag 21 1701                Nephrotoxins and other renal medications (From now, onward)    Start     Dose/Rate Route Frequency Ordered Stop    21 0400  vancomycin 1250 mg in 0.9% NaCl 250 mL intermittent infusion 1,250 mg         1,250 mg  over 90 Minutes Intravenous EVERY 12 HOURS 21 2200  piperacillin-tazobactam (ZOSYN) infusion 3.375 g        Note to Pharmacy: For [facility], [facility] and [facility]: For Zosyn-naive patients, use the \"Zosyn initial dose + extended infusion\" order panel.    3.375 g  100 mL/hr over 30 Minutes Intravenous EVERY 6 HOURS 21 191               Contrast Orders - past 72 hours (72h ago, onward)            Start     Dose/Rate Route Frequency Ordered Stop    21 1635  iopamidol (ISOVUE-370) solution 500 mL         500 mL Intravenous ONCE 21 1630 21 1631          Interpretation of levels and current regimen:  Vancomycin level is reflective of -600    Has serum creatinine changed greater than 50% in last 72 hours: No    Urine " output:  unable to determine    Renal Function: Stable    [employer]ight[employer] Kinetic Calculations:   Loading dose: N/A  Regimen: 1250 mg IV every 12 hours.  Start time: 16:00 on 11/07/2021  Exposure target: AUC24 (range)400-600 mg/L.hr   AUC24,ss: 515 mg/L.hr  Probability of AUC24 > 400: 100 %  Ctrough,ss: 14 mg/L  Probability of Ctrough,ss > 20: 8 %  Probability of nephrotoxicity (Lodise TOMÁS 2009): 9 %      Plan:  1. Continue Current Dose  2. Vancomycin monitoring method: AUC  3. Vancomycin therapeutic monitoring goal: 400-600 mg*h/L  4. Pharmacy will check vancomycin levels as appropriate in 1-3 Days.  5. Serum creatinine levels will be ordered daily for the first week of therapy and at least twice weekly for subsequent weeks.    Kelsey Avery, Pharm.D.

## 2021-11-07 NOTE — PROGRESS NOTES
11/07/21 0900   Quick Adds   Type of Visit Initial PT Evaluation       Present no   Living Environment   People in home significant other   Current Living Arrangements mobile home   Home Accessibility wheelchair accessible  (There are 2 KATI as well as a ramp to enter the home.)   Transportation Anticipated family or friend will provide   Living Environment Comments Patient lives with her significant other, Leo, in a mobile home near [place], [place].  Patient states that she primarily uses a manual wheelchair for mobility but occasionally stands and transfer with a standard walker with her significant other providing a wheelchair follow for safety.  Patient sleeps in a recliner chair with lift features.  She washes her hair at the bathroom sink.  She does have a tub-shower unit with a shower bench, requires assistance from significant other to transfer into tub.  Patient has a taller toilet.  Patient states she has not left the house in 3 years.   Self-Care   Usual Activity Tolerance poor   Current Activity Tolerance poor   Regular Exercise No   Equipment Currently Used at Home raised toilet seat;tub bench;walker, standard;wheelchair, manual   Activity/Exercise/Self-Care Comment Patient states she primarily use the manual wheelchair to move in the house but does have a standard walker which she uses when standing or stand-pivot transfers. Patient states she requires assistance from significant other for lower body dressing, getting to the toilet (reports [facility] perineal cares), getting into the tub using tub bench, household chores, transfers, and gait.  Significant other is responsible for errands outside the home.   Disability/Function   Walking or Climbing Stairs Difficulty yes   Walking or Climbing Stairs ambulation difficulty, requires equipment;ambulation difficulty, assistance 1 person   Dressing/Bathing Difficulty yes   Dressing/Bathing dressing difficulty, dependent;bathing difficulty,  dependent   Toileting issues yes   Doing Errands Independently Difficulty (such as shopping) yes   Fall history within last six months yes   Number of times patient has fallen within last six months 1   General Information   Onset of Illness/Injury or Date of Surgery 11/06/21   Referring Physician Arsalan Ng MD   Patient/Family Therapy Goals Statement (PT) Patient did not state therapy goals.   Pertinent History of Current Problem (include personal factors and/or comorbidities that impact the POC)  76 year old female longstanding history of hypertension, morbid obesity, prior hip surgery, dyslipidemia, and hypertension.  EMS personnel was activated earlier after apparently she had a fall event after sliding off from her recliner.   Existing Precautions/Restrictions fall   Cognition   Orientation Status (Cognition) oriented x 3;disoriented to;time   Affect/Mental Status (Cognition) sad/depressed affect;anxious;emotionally labile   Follows Commands (Cognition) WFL   Behavioral Issues overwhelmed easily   Pain Assessment   Patient Currently in Pain Yes, see Vital Sign flowsheet  (8/10 left calf/ankle pain)   Integumentary/Edema   Integumentary/Edema Comments Significant edema noted at bilateral lower extremities   Posture    Posture Forward head position;Protracted shoulders   Range of Motion (ROM)   ROM Quick Adds ROM deficits secondary to pain;ROM deficits secondary to swelling;ROM deficits secondary to weakness   Strength   Manual Muscle Testing Quick Adds Deficits observed during functional mobility   Bed Mobility   Comment (Bed Mobility) Patient declining to perform bed mobility due to 8/10 left lower extremity pain.  Per RN, bariatric bed and reclining chair ordered for room.     Transfers   Transfer Safety Comments Patient firmly declining OOB activity due to increased left lower extremity pain.  Patient will require bariatric walker.   Balance   Balance Comments Impaired dynamic balance   Sensory  Examination   Sensory Perception patient reports no sensory changes   Clinical Impression   Criteria for Skilled Therapeutic Intervention yes, treatment indicated   PT Diagnosis (PT) Impaired gait   Influenced by the following impairments Increased left lower extremity pain, bilateral lower extremity edema, generalized weakness, impaired dynamic balance, decreased activity tolerance, fear of movement/anxiety    Functional limitations due to impairments Impaired bed mobility, transfers, and gait   Clinical Presentation Evolving/Changing   Clinical Presentation Rationale Clinical judgement, PMH, response to activity, motivation for therapy   Clinical Decision Making (Complexity) moderate complexity   Therapy Frequency (PT) 5x/week   Predicted Duration of Therapy Intervention (days/wks) 7 days   Planned Therapy Interventions (PT) bed mobility training;home exercise program;patient/family education;transfer training;progressive activity/exercise;gait training   Anticipated Equipment Needs at Discharge (PT) bariatric equipment  (Patient owns standard walker, manual wc, and tub bench.)   Risk & Benefits of therapy have been explained evaluation/treatment results reviewed;care plan/treatment goals reviewed;risks/benefits reviewed;current/potential barriers reviewed;participants voiced agreement with care plan;participants included;patient   PT Discharge Planning    PT Discharge Recommendation (DC Rec) Transitional Care Facility;home with assist;home with home care physical therapy   PT Rationale for DC Rec Patient significantly below baseline mobility.  Anticipate patient would require assistance x 1-2 for ADLs, IADLs, and all functional mobility tasks.  Patient would benefit from continued IP PT as well as TCU to progress independence with functional mobility tasks and increase activity tolerance prior to returning home.  If patient were to decline TCU, she would require 24/7 assistance x 2 as well as a Joey  and  bedside commode.  Patient owns a manual wheelchair.  Patient would also require HH PT as patient unable to ambulate community distances and leaving the home does require significant and taxing effort.   PT Brief overview of current status  Ax2, bariatric walker   Total Evaluation Time   Total Evaluation Time (Minutes) 10

## 2021-11-07 NOTE — PROGRESS NOTES
[facility]  Hospitalist Progress Note  Arsalan Ng MD, MD 11/07/2021  (Text Page)  Reason for Stay (Diagnosis): Early sepsis versus SIRS secondary to underlying left lower extremity cellulitis         Assessment and Plan:      Summary of Stay: Cristy Bailey is a 76 year old female longstanding history of hypertension, morbid obesity, prior hip surgery, dyslipidemia, hypertension, reportedly lives in a mobile home setting and moves around utilizing a wheelchair and reports they can earlier from patient's neighbors as per EMS report to ER that patient has not left the house of approximately 3 years already.  EMS personnel was activated earlier after apparently she had a fall event after sliding off from her recliner.  Her significant other attempted to help her but was not able to do so that led to EMS activation.      1.  SIRS versus early sepsis with lactic acidosis, significant leukocytosis, tachycardia 104 bpm with underlying right lower extremity cellulitis, ruling out abscess  2.  Fall event with severe physical deconditioning with concomitant failure to thrive  3.  Morbid obesity  4.  History of hypertension  5.  History of dyslipidemia  6.  Routine COVID-19 screen negative     Continue inpatient care as patient remained high risk for clinical deterioration  Check procalcitonin, trend CRP.  Leukocytosis trending down.  Creatinine remained permitting.  Culture sent earlier still showing no growth up-to-date  Currently receiving Zosyn and vancomycin as initiated during admission process  -As far as I now she has no prior history of MRSA.  I will continue the systemic vancomycin at least for the next 24 hours and planning to de-escalate if she continues to demonstrate significant improvement and no findings of MRSA on her cultures.  -CT scan of the chest earlier ruled out PE but cannot totally rule out possibility of consolidation versus atelectasis.  Doubtful for underlying pneumonia  likely atelectasis as patient mostly wheelchair-bound does not really walk around the house as a matter of fact she has not left her house for at least for the past 3 years as previously reported.  She is also not showing any signs and symptoms suggestive of pneumonia such as coughing spells, shortness of breath and not requiring any oxygen support.  Nevertheless she is on broad-spectrum antibiotics for now and will provide coverage with that as well.  -Wound care support  -I believe the earlier prolactin level that was sent during admission process at the ER might be erroneous and likely the intention was to send procalcitonin.  -Remain at risk for VTE.  Continue with chemo DVT prophylaxis and PCD's.  She mentioned to me that she has prior history of VTE but has not been on any anticoagulation for years previously was maintained on warfarin.  -Stop IV fluids  -Urine analysis reassuring with no clear evidence of infection  -Requested lymphedema service evaluation  -Requested wound care evaluation    -I also reiterated to Shanthi and her friend as witness at bedside through that she will be needing close follow-up care upon discharge and will also need extensive foot care and will be provided with podiatry referral given her significant onychomycosis, lymphedema, numerous skin excoriations.      Code status: DNR/DNI as expressed by the patient    Prophylaxis: Lovenox, high risk for VTE, can also apply PCD's  Disposition:  Shanthi will still be needing inpatient care and I am anticipating she will be in the hospital at least in the next 2-3 more days..               Interval History (Subjective):      Continuing care today.  Seen and examined.  Chart reviewed.  Case discussed with nursing service.  Patient's friend who is also her primary caregiver at her mobile home is present at bedside during exam and provided with medical updates upon getting permission from the patient herself.  -She is still having intermittent  "cramps, pain and discomfort most pronounced on her left lower extremity.  She still has some intermittent anxiety spells.  Remain afebrile.  Stable hemodynamics.  Not requiring any oxygen support.  No reported nausea, vomiting or diarrhea.  No mental status changes.  Her friend attested that her current mental status at baseline.                  Physical Exam:      Last Vital Signs:  /55 (BP Location: Left arm)   Pulse 73   Temp 98  F (36.7  C) (Temporal)   Resp 16   Ht 1.702 m (5' 7\")   Wt (!) 163 kg (359 lb 5.6 oz)   SpO2 99%   BMI 56.28 kg/m      I/O last 3 completed shifts:  In: 320 [P.O.:320]  Out: 0   Wt Readings from Last 1 Encounters:   11/06/21 (!) 163 kg (359 lb 5.6 oz)     Vitals:    11/06/21 1543   Weight: (!) 163 kg (359 lb 5.6 oz)       Constitutional: Awake, alert, cooperative, no apparent distress   Respiratory:  Fair air entry, distant breath sounds, no crackles or wheezing   Cardiovascular: Regular rate and rhythm, normal S1 and S2, and no murmur noted   Abdomen: Normal bowel sounds, soft, non-distended, non-tender   Skin:  Multiple skin excoriations on upper and lower extremities, bruises and ecchymosis seem as well   Neuro: Alert and oriented x3, no weakness, spontaneous and coherent speech   Extremities:  Bilateral lymphedema lower extremities, extensive onychomycosis, very dry skin with several excoriations noted,    normal range of motion    This is a photograph during early part of her hospitalization at the time of admission process         Other(s):  Mildly anxious mood, not agitated       All other systems: Negative          Medications:      All current medications were reviewed with changes reflected in problem list.         Data:      All new lab and imaging data was reviewed.   Labs:  No results for input(s): CULT in the last 168 hours.  Recent Labs   Lab 11/07/21  0552 11/06/21  1518 11/06/21  1254   WBC 24.5* 30.8* 22.2*   HGB 12.6 13.8 13.8   HCT 39.9 42.9 44.2   MCV " 95 93 93    268 285     Recent Labs   Lab 11/07/21  0552 11/06/21  2139 11/06/21  1357 11/06/21  1254     --   --  137   POTASSIUM 4.0 4.1  --  5.1   CHLORIDE 107  --   --  108   CO2 25  --   --  26   ANIONGAP 6  --   --  3   *  --   --  119*   BUN 17  --   --  12   CR 0.80  --  0.67 0.57   GFRESTIMATED 73  --  87 >90   ASHIA 7.5*  --   --  7.1*   MAG 1.9  --  1.7  --    PROTTOTAL 6.2*  --   --  6.4*   ALBUMIN 2.1*  --   --  2.1*   BILITOTAL 0.7  --   --  1.1   ALKPHOS 91  --   --  94   AST 35  --   --  55*   ALT 29  --   --  32     Recent Labs   Lab 11/06/21  1254        Recent Labs   Lab 11/06/21  1253   DD 1.76*     Recent Labs   Lab 11/06/21  1254   CRP 97.9*     Recent Labs   Lab 11/07/21  0552 11/06/21  1254   * 119*     No results for input(s): INR in the last 168 hours.  No results for input(s): LIPASE in the last 168 hours.  Recent Labs   Lab 11/06/21  1254   TROPONIN <0.015     Recent Labs   Lab 11/07/21  1148   COLOR Yellow   APPEARANCE Clear   URINEGLC Negative   URINEBILI Negative   URINEKETONE 10 *   SG 1.010   UBLD Negative   URINEPH 6.0   PROTEIN 30 *   NITRITE Negative   LEUKEST Negative   RBCU 4*   WBCU 2      Imaging:   Results for orders placed or performed during the hospital encounter of 11/06/21   CT Chest/Abdomen/Pelvis w Contrast    Narrative    EXAM: CT CHEST/ABDOMEN/PELVIS W CONTRAST  LOCATION: [facility]  DATE/TIME: 11/6/2021 4:30 PM    INDICATION: SOB, history of DVT, D-dimer 1.9, right hip redness, and decubitus, eval for pelvic infection.  COMPARISON: None.  TECHNIQUE: CT scan of the chest, abdomen, and pelvis was performed following injection of IV contrast. Multiplanar reformats were obtained. Dose reduction techniques were used.   CONTRAST: 100 mL Isovue-370    FINDINGS:   LUNGS AND PLEURA: Patchy opacity AP atelectasis at the left base, but developing pneumonia not excluded. No effusions or pneumothorax. Right middle lobe 0.4  cm nodule series 9 image 137. Adjacent calcified granuloma.    MEDIASTINUM/AXILLAE: No acute thoracic aortic abnormality. No pulmonary embolism can be seen. A few small thyroid nodules. No suspicious lymph node.    CORONARY ARTERY CALCIFICATION: Moderate.    HEPATOBILIARY: Cholelithiasis suggested. No acute liver abnormality.    PANCREAS: Normal.    SPLEEN: Normal.    ADRENAL GLANDS: Normal.    KIDNEYS/BLADDER: Normal.    BOWEL: A few mildly distended small bowel loops. No convincing acute bowel abnormality. Limited visualization of the bowel. Appendix cannot be seen. No secondary signs of appendicitis.    LYMPH NODES: Normal.    VASCULATURE: Unremarkable.    PELVIC ORGANS: Obscured for assessment by streak artifact.    MUSCULOSKELETAL: Bilateral hip arthroplasties. No aggressive bone lesion. No clear subcutaneous fluid collections can be seen but assessment limited by body habitus and streak artifact at the pelvis and abdomen.      Impression    IMPRESSION:  1.  Mild consolidation versus atelectasis of the left lower lobe. Pneumonia at this position is possible.  2.  Cholelithiasis.  3.  Coronary artery calcifications.  4.  No acute abnormality otherwise seen with limitation as detailed above.

## 2021-11-07 NOTE — PROGRESS NOTES
11/07/21 1121   Quick Adds   Type of Visit Initial Occupational Therapy Evaluation   Living Environment   People in home significant other   Current Living Arrangements mobile home   Home Accessibility wheelchair accessible   Transportation Anticipated family or friend will provide   Living Environment Comments Patient lives with her significant other, Leo, in a mobile home near [place], [place].  Patient states that she primarily uses a manual wheelchair for mobility but occasionally stands and transfer with a standard walker with her significant other providing a wheelchair follow for safety.  Patient sleeps in a recliner chair with lift features.  She washes her hair at the bathroom sink.  She does have a tub-shower unit with a shower bench, requires assistance from significant other to transfer into tub.  Patient has a taller toilet.  Patient states she has not left the house in 3 years.   Self-Care   Usual Activity Tolerance poor   Current Activity Tolerance poor   Regular Exercise No   Equipment Currently Used at Home raised toilet seat;tub bench;walker, standard;wheelchair, manual   Instrumental Activities of Daily Living (IADL)   IADL Comments S.O completes all IADLs at baseline    Disability/Function   Hearing Difficulty or Deaf no   Wear Glasses or Blind no   Concentrating, Remembering or Making Decisions Difficulty no   Difficulty Communicating no   Difficulty Eating/Swallowing no   Walking or Climbing Stairs Difficulty yes   Walking or Climbing Stairs ambulation difficulty, requires equipment;ambulation difficulty, assistance 1 person   Dressing/Bathing Difficulty yes   Dressing/Bathing dressing difficulty, dependent;bathing difficulty, dependent   Toileting issues yes   Toileting Assistance toileting difficulty, dependent   Doing Errands Independently Difficulty (such as shopping) yes   Fall history within last six months yes   Number of times patient has fallen within last six months 1   Change in  Functional Status Since Onset of Current Illness/Injury no   General Information   Onset of Illness/Injury or Date of Surgery 11/06/21   Referring Physician Arsalan Ng MD   Patient/Family Therapy Goal Statement (OT) get home with S.O.   Additional Occupational Profile Info/Pertinent History of Current Problem Cristy Bailey is a 76 year old female longstanding history of hypertension, morbid obesity, prior hip surgery, dyslipidemia, hypertension, reportedly lives in a mobile home setting and moves around utilizing a wheelchair and reports they can earlier from patient's neighbors as per EMS report to ER that patient has not left the house of approximately 3 years already.  EMS personnel was activated earlier after apparently she had a fall event after sliding off from her recliner.  Her significant other attempted to help her but was not able to do so that led to EMS activation.    Existing Precautions/Restrictions fall   Cognitive Status Examination   Orientation Status orientation to person, place and time   Affect/Mental Status (Cognitive) anxious   Safety Deficit insight into deficits/self-awareness   Cognitive Status Comments very anxious throughout session   Pain Assessment   Patient Currently in Pain Yes, see Vital Sign flowsheet   Bed Mobility   Bed Mobility supine-sit;sit-supine   Supine-Sit [place] (Bed Mobility) maximum assist (25% patient effort);2 person assist   Sit-Supine [place] (Bed Mobility) maximum assist (25% patient effort);2 person assist   Clinical Impression   Criteria for Skilled Therapeutic Interventions Met (OT) yes;meets criteria;skilled treatment is necessary   OT Diagnosis weakness   OT Problem List-Impairments impacting ADL problems related to;activity tolerance impaired;cognition;strength;pain   ADL comments/analysis fear of falling, anxiety, pain with movement, pain at rest, decreased strength, poor balance (sitting)   Assessment of Occupational Performance 3-5  Performance Deficits   Identified Performance Deficits difficulty with basic ADLs including g/h and toilet transfer. Unable to complete taxing IADLs   Planned Therapy Interventions (OT) ADL retraining;IADL retraining;strengthening   Clinical Decision Making Complexity (OT) moderate complexity   Therapy Frequency (OT) 4x/week   OT Discharge Planning    OT Discharge Recommendation (DC Rec) Transitional Care Facility;home with home care occupational therapy   OT Rationale for DC Rec Patient is performing below baseline at this time with her ability to complete self care. Patient at this time requires use of lift for all transfers out of bed. She requires max A x2 for supine <> sit and limited EOB sitting tolerance. Patient would benefit from TCU to increase strength/activity tolerance and (I) with ADLs to return to PLOF. If patient discharges home she will require mechanical lift for toileting and to get out of wheelchair. She will require physical assistance with all ADLs and IADLs. If discharges home would benefit from HHOP to assist with ADLs. Patient is homebound due to the taxing effort and Ax1 to leave home. Patient continues to benefit from IP OT services to assist in return to PLOF.    Total Evaluation Time (Minutes)   Total Evaluation Time (Minutes) 10

## 2021-11-07 NOTE — CONSULTS
Care Management Initial Consult    General Information  Assessment completed with: Patient,    Type of CM/SW Visit: Initial Assessment    Primary Care Provider verified and updated as needed:     Readmission within the last 30 days:        Reason for Consult: discharge planning  Advance Care Planning:            Communication Assessment  Patient's communication style: spoken language (English or Bilingual)    Hearing Difficulty or Deaf: no   Wear Glasses or Blind: no    Cognitive  Cognitive/Neuro/Behavioral: .WDL except  Level of Consciousness: alert  Arousal Level: opens eyes spontaneously  Orientation: oriented x 4  Mood/Behavior: anxious (crying with most interactions)  Best Language: 0 - No aphasia  Speech: clear    Living Environment:   People in home: significant other     Current living Arrangements: mobile home      Able to return to prior arrangements:         Family/Social Support:  Care provided by:    Provides care for:    Marital Status: Lives with Significant Other  Significant Other          Description of Support System: Supportive,Involved         Current Resources:   Patient receiving home care services: No     Community Resources:    Equipment currently used at home: grab bar, tub/shower,raised toilet seat,shower chair,walker, rolling,wheelchair, manual  Supplies currently used at home:      Employment/Financial:  Employment Status:          Financial Concerns: No concerns identified           Lifestyle & Psychosocial Needs:  Social Determinants of Health     Tobacco Use: Medium Risk     Smoking Tobacco Use: Former Smoker     Smokeless Tobacco Use: Never Used   Alcohol Use: Not on file   Financial Resource Strain: Not on file   Food Insecurity: Not on file   Transportation Needs: Not on file   Physical Activity: Not on file   Stress: Not on file   Social Connections: Not on file   Intimate Partner Violence: Not on file   Depression: Not at risk     PHQ-2 Score: 0   Housing Stability: Not on file        Functional Status:  Prior to admission patient needed assistance: yes          Mental Health Status:  Mental Health Status: No Current Concerns       Chemical Dependency Status:  Chemical Dependency Status: No Current Concerns             Values/Beliefs:  Spiritual, Cultural Beliefs, Yazdanism Practices, Values that affect care:                 Additional Information:  Pt reports that she lives with her SO in a mobile home.  She reports that she uses her manual wc and walker.  Pt reports that her SO helps assist with all ADL's.  She was agreeable to [facility] and prefers a shared room since insurance covers that. She reports her SO can provide transportation. She prefers a referral to [facility] since it's close to her house. Referral made. Will need a PAS. Also asked if client was requesting community resources since it was indicated on the  consult order, however, she reports she did not request resources and she did not have any other concerns/issues.     Kaylynn THORNE, [facility]  Inpatient Care Coordination   [facility]   [phone]

## 2021-11-07 NOTE — PLAN OF CARE
Patient A&Ox4 but is forgetful. Arrived to unit from ER at approximately 1840 on a cart and transferred to the bed using a hover mat. VS stable. Used lift for transfers. Patient anxious and keep asking the same questions. Using purewick, tolerated regular diet, does not remember when she last had a bowel movement. Patient has a lot of skin issues, bruises all over, scabs and dry scaly skin on lower extremities and +4 edema on lower extremities. Patient needs a UA collected. Has NS running at 100ml/hr. Has orders for PT and OT. Will continue to monitor.

## 2021-11-08 ENCOUNTER — APPOINTMENT (OUTPATIENT)
Dept: PHYSICAL THERAPY | Facility: CLINIC | Age: 76
DRG: 871 | End: 2021-11-08
Payer: COMMERCIAL

## 2021-11-08 ENCOUNTER — APPOINTMENT (OUTPATIENT)
Dept: OCCUPATIONAL THERAPY | Facility: CLINIC | Age: 76
DRG: 871 | End: 2021-11-08
Payer: COMMERCIAL

## 2021-11-08 LAB
ANION GAP SERPL CALCULATED.3IONS-SCNC: 6 MMOL/L (ref 3–14)
ATRIAL RATE - MUSE: 98 BPM
BASOPHILS # BLD AUTO: 0.1 10E3/UL (ref 0–0.2)
BASOPHILS NFR BLD AUTO: 1 %
BUN SERPL-MCNC: 18 MG/DL (ref 7–30)
CALCIUM SERPL-MCNC: 7.9 MG/DL (ref 8.5–10.1)
CHLORIDE BLD-SCNC: 111 MMOL/L (ref 94–109)
CO2 SERPL-SCNC: 25 MMOL/L (ref 20–32)
CREAT SERPL-MCNC: 0.73 MG/DL (ref 0.52–1.04)
CRP SERPL-MCNC: 159 MG/L (ref 0–8)
DIASTOLIC BLOOD PRESSURE - MUSE: NORMAL MMHG
EOSINOPHIL # BLD AUTO: 0.6 10E3/UL (ref 0–0.7)
EOSINOPHIL NFR BLD AUTO: 7 %
ERYTHROCYTE [DISTWIDTH] IN BLOOD BY AUTOMATED COUNT: 14.6 % (ref 10–15)
GFR SERPL CREATININE-BSD FRML MDRD: 81 ML/MIN/1.73M2
GLUCOSE BLD-MCNC: 101 MG/DL (ref 70–99)
HCT VFR BLD AUTO: 39.2 % (ref 35–47)
HGB BLD-MCNC: 12.7 G/DL (ref 11.7–15.7)
IMM GRANULOCYTES # BLD: 0.1 10E3/UL
IMM GRANULOCYTES NFR BLD: 2 %
INTERPRETATION ECG - MUSE: NORMAL
LYMPHOCYTES # BLD AUTO: 1.2 10E3/UL (ref 0.8–5.3)
LYMPHOCYTES NFR BLD AUTO: 12 %
MAGNESIUM SERPL-MCNC: 2.2 MG/DL (ref 1.6–2.3)
MCH RBC QN AUTO: 29.8 PG (ref 26.5–33)
MCHC RBC AUTO-ENTMCNC: 32.4 G/DL (ref 31.5–36.5)
MCV RBC AUTO: 92 FL (ref 78–100)
MONOCYTES # BLD AUTO: 0.7 10E3/UL (ref 0–1.3)
MONOCYTES NFR BLD AUTO: 8 %
NEUTROPHILS # BLD AUTO: 6.6 10E3/UL (ref 1.6–8.3)
NEUTROPHILS NFR BLD AUTO: 70 %
NRBC # BLD AUTO: 0 10E3/UL
NRBC BLD AUTO-RTO: 0 /100
P AXIS - MUSE: 55 DEGREES
PLATELET # BLD AUTO: 263 10E3/UL (ref 150–450)
POTASSIUM BLD-SCNC: 4.3 MMOL/L (ref 3.4–5.3)
PR INTERVAL - MUSE: 180 MS
QRS DURATION - MUSE: 88 MS
QT - MUSE: 342 MS
QTC - MUSE: 436 MS
R AXIS - MUSE: 17 DEGREES
RBC # BLD AUTO: 4.26 10E6/UL (ref 3.8–5.2)
SODIUM SERPL-SCNC: 142 MMOL/L (ref 133–144)
SYSTOLIC BLOOD PRESSURE - MUSE: NORMAL MMHG
T AXIS - MUSE: 0 DEGREES
VENTRICULAR RATE- MUSE: 98 BPM
WBC # BLD AUTO: 9.4 10E3/UL (ref 4–11)

## 2021-11-08 PROCEDURE — 99223 1ST HOSP IP/OBS HIGH 75: CPT | Performed by: CLINICAL NURSE SPECIALIST

## 2021-11-08 PROCEDURE — G0463 HOSPITAL OUTPT CLINIC VISIT: HCPCS

## 2021-11-08 PROCEDURE — 83735 ASSAY OF MAGNESIUM: CPT | Performed by: INTERNAL MEDICINE

## 2021-11-08 PROCEDURE — 97530 THERAPEUTIC ACTIVITIES: CPT | Mod: GP | Performed by: PHYSICAL THERAPIST

## 2021-11-08 PROCEDURE — 250N000013 HC RX MED GY IP 250 OP 250 PS 637: Performed by: INTERNAL MEDICINE

## 2021-11-08 PROCEDURE — 120N000001 HC R&B MED SURG/OB

## 2021-11-08 PROCEDURE — 250N000011 HC RX IP 250 OP 636: Performed by: INTERNAL MEDICINE

## 2021-11-08 PROCEDURE — 97530 THERAPEUTIC ACTIVITIES: CPT | Mod: GO

## 2021-11-08 PROCEDURE — 80048 BASIC METABOLIC PNL TOTAL CA: CPT | Performed by: INTERNAL MEDICINE

## 2021-11-08 PROCEDURE — 258N000003 HC RX IP 258 OP 636: Performed by: INTERNAL MEDICINE

## 2021-11-08 PROCEDURE — 36416 COLLJ CAPILLARY BLOOD SPEC: CPT | Performed by: INTERNAL MEDICINE

## 2021-11-08 PROCEDURE — 36415 COLL VENOUS BLD VENIPUNCTURE: CPT | Performed by: INTERNAL MEDICINE

## 2021-11-08 PROCEDURE — 86140 C-REACTIVE PROTEIN: CPT | Performed by: INTERNAL MEDICINE

## 2021-11-08 PROCEDURE — 250N000013 HC RX MED GY IP 250 OP 250 PS 637: Performed by: CLINICAL NURSE SPECIALIST

## 2021-11-08 PROCEDURE — 97140 MANUAL THERAPY 1/> REGIONS: CPT | Mod: GP | Performed by: PHYSICAL THERAPIST

## 2021-11-08 PROCEDURE — 99233 SBSQ HOSP IP/OBS HIGH 50: CPT | Performed by: INTERNAL MEDICINE

## 2021-11-08 PROCEDURE — 85004 AUTOMATED DIFF WBC COUNT: CPT | Performed by: INTERNAL MEDICINE

## 2021-11-08 RX ORDER — ACETAMINOPHEN 500 MG
1000 TABLET ORAL 3 TIMES DAILY
Status: DISCONTINUED | OUTPATIENT
Start: 2021-11-08 | End: 2021-01-01 | Stop reason: HOSPADM

## 2021-11-08 RX ORDER — AMOXICILLIN 250 MG
1 CAPSULE ORAL
Status: DISCONTINUED | OUTPATIENT
Start: 2021-11-08 | End: 2021-01-01 | Stop reason: HOSPADM

## 2021-11-08 RX ADMIN — TAZOBACTAM SODIUM AND PIPERACILLIN SODIUM 3.38 G: 375; 3 INJECTION, SOLUTION INTRAVENOUS at 09:25

## 2021-11-08 RX ADMIN — ENOXAPARIN SODIUM 40 MG: 40 INJECTION SUBCUTANEOUS at 06:11

## 2021-11-08 RX ADMIN — MICONAZOLE NITRATE: 20 POWDER TOPICAL at 09:07

## 2021-11-08 RX ADMIN — TAZOBACTAM SODIUM AND PIPERACILLIN SODIUM 3.38 G: 375; 3 INJECTION, SOLUTION INTRAVENOUS at 04:27

## 2021-11-08 RX ADMIN — ENOXAPARIN SODIUM 40 MG: 40 INJECTION SUBCUTANEOUS at 18:36

## 2021-11-08 RX ADMIN — VANCOMYCIN HYDROCHLORIDE 1250 MG: 10 INJECTION, POWDER, LYOPHILIZED, FOR SOLUTION INTRAVENOUS at 23:45

## 2021-11-08 RX ADMIN — TAZOBACTAM SODIUM AND PIPERACILLIN SODIUM 3.38 G: 375; 3 INJECTION, SOLUTION INTRAVENOUS at 22:43

## 2021-11-08 RX ADMIN — CAMPHOR AND MENTHOL: 5; 5 LOTION TOPICAL at 22:00

## 2021-11-08 RX ADMIN — LORAZEPAM 0.5 MG: 0.5 TABLET ORAL at 21:17

## 2021-11-08 RX ADMIN — MICONAZOLE NITRATE: 20 POWDER TOPICAL at 22:47

## 2021-11-08 RX ADMIN — TAZOBACTAM SODIUM AND PIPERACILLIN SODIUM 3.38 G: 375; 3 INJECTION, SOLUTION INTRAVENOUS at 16:54

## 2021-11-08 RX ADMIN — VANCOMYCIN HYDROCHLORIDE 1250 MG: 10 INJECTION, POWDER, LYOPHILIZED, FOR SOLUTION INTRAVENOUS at 06:08

## 2021-11-08 RX ADMIN — ACETAMINOPHEN 1000 MG: 500 TABLET, FILM COATED ORAL at 20:55

## 2021-11-08 ASSESSMENT — ACTIVITIES OF DAILY LIVING (ADL)
ADLS_ACUITY_SCORE: 27
ADLS_ACUITY_SCORE: 23
ADLS_ACUITY_SCORE: 27

## 2021-11-08 ASSESSMENT — MIFFLIN-ST. JEOR: SCORE: 2238.15

## 2021-11-08 NOTE — PLAN OF CARE
Vss. Afebrile. Lungs diminished-room air high 90s. +gas, Lbm 11/05/21. Voiding via Jvnoynmf-rvijaz-uxmsohaua small amounts with some incontinence. Ivf infusing. Cms-2+ generalized edema, 3+ ankles & 4+ feet. Skin has bruising, rash, scabs, intact blisters, and dry/cracked/peeling feet & upper arms. Excoriation to abdominal folds & under breasts-medicated powder applied. Continues on Zosyn & Vancomycin. Anticipate Midline placement today. Tolerating some elevation with pillows & repositioning. Pulsate Alberto Bed in use. No other significant issues noted overnight

## 2021-11-08 NOTE — PLAN OF CARE
"BP (!) 145/60 (BP Location: Left arm)   Pulse 75   Temp 97.2  F (36.2  C) (Oral)   Resp 16   Ht 1.702 m (5' 7\")   Wt (!) 170.6 kg (376 lb)   SpO2 96%   BMI 58.89 kg/m    Neuro: Forgetful, anxious.   Cardiac: WNL  Lungs: Diminished.   GI: BM today. Bedside commode.   : Purewick in place.   Pain: 0 when at rest. 6 when moving.  IV: TKO NS.  Meds: Zosyn, lovenox  Labs/tests: Mg and K protocol.  Diet: Reg  Activity: lift x2.   Misc: Edema to LE's, +3 generalized, +4 left and right ankles, left foot, +3 right foot.. Numbness and tingling in both legs. Redness in skin folds- Micantin applied.   Plan: Continue with ultrasound peripheral IV after talking to Dr. East. Possible discharge in 2-3 days.      "

## 2021-11-08 NOTE — PROGRESS NOTES
[facility]  Hospitalist Progress Note  Arsalan Ng MD, MD 11/08/2021  (Text Page)  Reason for Stay (Diagnosis): Early sepsis versus SIRS secondary to underlying left lower extremity cellulitis         Assessment and Plan:      Summary of Stay: Cristy Bailey is a 76 year old female longstanding history of hypertension, morbid obesity, prior hip surgery, dyslipidemia, hypertension, reportedly lives in a mobile home setting and moves around utilizing a wheelchair and reports they can earlier from patient's neighbors as per EMS report to ER that patient has not left the house of approximately 3 years already.  EMS personnel was activated earlier after apparently she had a fall event after sliding off from her recliner.  Her significant other attempted to help her but was not able to do so that led to EMS activation.      1.  SIRS versus early sepsis with lactic acidosis, significant leukocytosis, tachycardia 104 bpm with underlying right lower extremity cellulitis, ruling out abscess  2.  Fall event with severe physical deconditioning with concomitant failure to thrive  3.  Morbid obesity  4.  History of hypertension  5.  History of dyslipidemia  6.  Routine COVID-19 screen negative     Continue inpatient care as patient remained high risk for clinical deterioration  Check procalcitonin, trend CRP.  Leukocytosis trending down.  Creatinine remained permitting.  Culture sent earlier still showing no growth up-to-date  -Labs today are still pending  -Procalcitonin as anticipated is very much elevated  -Cultures still showing no growth up-to-date yet  -She is clinically improving, afebrile.  Stopping vancomycin and continuing IV Zosyn  -She is still not a good candidate for PICC line insertion until we are quite certain that she has no underlying bacteremia given significant infectious process going on with elevated procalcitonin, leukocytosis peaked at 30K      -CT scan of the chest earlier ruled  out PE but cannot totally rule out possibility of consolidation versus atelectasis.  Doubtful for underlying pneumonia likely atelectasis as patient mostly wheelchair-bound does not really walk around the house as a matter of fact she has not left her house for at least for the past 3 years as previously reported.  She is also not showing any signs and symptoms suggestive of pneumonia such as coughing spells, shortness of breath and not requiring any oxygen support.  Nevertheless she is on broad-spectrum antibiotics for now and will provide coverage with that as well.  -Wound care support  -I believe the earlier prolactin level that was sent during admission process at the ER might be erroneous and likely the intention was to send procalcitonin.  -Remain at risk for VTE.  Continue with chemo DVT prophylaxis and PCD's.  She mentioned to me that she has prior history of VTE but has not been on any anticoagulation for years previously was maintained on warfarin.  -Stop IV fluids  -Urine analysis reassuring with no clear evidence of infection  -Requested lymphedema service evaluation  -Requested wound care evaluation    -I also reiterated to Shanthi and her friend as witness at bedside through that she will be needing close follow-up care upon discharge and will also need extensive foot care and will be provided with podiatry referral given her significant onychomycosis, lymphedema, numerous skin excoriations.      Code status: DNR/DNI as expressed by the patient    Prophylaxis: Lovenox, high risk for VTE and increased dosing to twice daily frequency, can also apply PCD's  Disposition:  Shanthi will still be needing inpatient care and I am anticipating she will be in the hospital at least in the next 2-3 more days..               Interval History (Subjective):      Continuing care today.  Seen and examined.  Chart reviewed.  Case discussed with nursing service.  Shanthi appears to be in good spirits this morning.  She is very  "cheerful to talk to, interactive and pleasant.  She mentioned that she is feeling better with decrease intensity and frequency of leg pain.   She had a decent sleep last night.  Tolerating oral diet.  No reported nausea or vomiting or diarrhea.  Remain afebrile.  No mental status changes reported.  Not requiring any oxygen support.               Physical Exam:      Last Vital Signs:  BP (!) 145/60 (BP Location: Left arm)   Pulse 75   Temp 97.2  F (36.2  C) (Oral)   Resp 18   Ht 1.702 m (5' 7\")   Wt (!) 163 kg (359 lb 5.6 oz)   SpO2 96%   BMI 56.28 kg/m      I/O last 3 completed shifts:  In: 2710 [P.O.:1260; I.V.:1450]  Out: 250 [Urine:250]  Wt Readings from Last 1 Encounters:   11/06/21 (!) 163 kg (359 lb 5.6 oz)     Vitals:    11/06/21 1543   Weight: (!) 163 kg (359 lb 5.6 oz)       Constitutional: Awake, alert, cooperative, no apparent distress   Respiratory:  Fair air entry, distant breath sounds, no crackles or wheezing   Cardiovascular: Regular rate and rhythm, normal S1 and S2, and no murmur noted   Abdomen: Normal bowel sounds, soft, non-distended, non-tender   Skin:  Multiple skin excoriations on upper and lower extremities, bruises and ecchymosis seem as well   Neuro: Alert and oriented x3, no weakness, spontaneous and coherent speech   Extremities:  Bilateral lymphedema lower extremities, extensive onychomycosis, very dry skin with several excoriations noted,     Decreasing edema on both lower extremities   normal range of motion    This is a photograph during early part of her hospitalization at the time of admission process         Other(s):  Euthymic mood now, not agitated       All other systems: Negative          Medications:      All current medications were reviewed with changes reflected in problem list.         Data:      All new lab and imaging data was reviewed.   Labs:  No results for input(s): CULT in the last 168 hours.  Recent Labs   Lab 11/07/21  0552 11/06/21  1518 11/06/21  1254 "   WBC 24.5* 30.8* 22.2*   HGB 12.6 13.8 13.8   HCT 39.9 42.9 44.2   MCV 95 93 93    268 285     Recent Labs   Lab 11/07/21  0552 11/06/21  2139 11/06/21  1357 11/06/21  1254     --   --  137   POTASSIUM 4.0 4.1  --  5.1   CHLORIDE 107  --   --  108   CO2 25  --   --  26   ANIONGAP 6  --   --  3   *  --   --  119*   BUN 17  --   --  12   CR 0.80  --  0.67 0.57   GFRESTIMATED 73  --  87 >90   ASHIA 7.5*  --   --  7.1*   MAG 1.9  --  1.7  --    PROTTOTAL 6.2*  --   --  6.4*   ALBUMIN 2.1*  --   --  2.1*   BILITOTAL 0.7  --   --  1.1   ALKPHOS 91  --   --  94   AST 35  --   --  55*   ALT 29  --   --  32     Recent Labs   Lab 11/06/21  1254        Recent Labs   Lab 11/06/21  1253   DD 1.76*     Recent Labs   Lab 11/06/21  1254   CRP 97.9*     Recent Labs   Lab 11/07/21  0552 11/06/21  1254   * 119*     No results for input(s): INR in the last 168 hours.  No results for input(s): LIPASE in the last 168 hours.  Recent Labs   Lab 11/06/21  1254   TROPONIN <0.015     Recent Labs   Lab 11/07/21  1148   COLOR Yellow   APPEARANCE Clear   URINEGLC Negative   URINEBILI Negative   URINEKETONE 10 *   SG 1.010   UBLD Negative   URINEPH 6.0   PROTEIN 30 *   NITRITE Negative   LEUKEST Negative   RBCU 4*   WBCU 2      Imaging:   Results for orders placed or performed during the hospital encounter of 11/06/21   CT Chest/Abdomen/Pelvis w Contrast    Narrative    EXAM: CT CHEST/ABDOMEN/PELVIS W CONTRAST  LOCATION: [facility]  DATE/TIME: 11/6/2021 4:30 PM    INDICATION: SOB, history of DVT, D-dimer 1.9, right hip redness, and decubitus, eval for pelvic infection.  COMPARISON: None.  TECHNIQUE: CT scan of the chest, abdomen, and pelvis was performed following injection of IV contrast. Multiplanar reformats were obtained. Dose reduction techniques were used.   CONTRAST: 100 mL Isovue-370    FINDINGS:   LUNGS AND PLEURA: Patchy opacity AP atelectasis at the left base, but developing  pneumonia not excluded. No effusions or pneumothorax. Right middle lobe 0.4 cm nodule series 9 image 137. Adjacent calcified granuloma.    MEDIASTINUM/AXILLAE: No acute thoracic aortic abnormality. No pulmonary embolism can be seen. A few small thyroid nodules. No suspicious lymph node.    CORONARY ARTERY CALCIFICATION: Moderate.    HEPATOBILIARY: Cholelithiasis suggested. No acute liver abnormality.    PANCREAS: Normal.    SPLEEN: Normal.    ADRENAL GLANDS: Normal.    KIDNEYS/BLADDER: Normal.    BOWEL: A few mildly distended small bowel loops. No convincing acute bowel abnormality. Limited visualization of the bowel. Appendix cannot be seen. No secondary signs of appendicitis.    LYMPH NODES: Normal.    VASCULATURE: Unremarkable.    PELVIC ORGANS: Obscured for assessment by streak artifact.    MUSCULOSKELETAL: Bilateral hip arthroplasties. No aggressive bone lesion. No clear subcutaneous fluid collections can be seen but assessment limited by body habitus and streak artifact at the pelvis and abdomen.      Impression    IMPRESSION:  1.  Mild consolidation versus atelectasis of the left lower lobe. Pneumonia at this position is possible.  2.  Cholelithiasis.  3.  Coronary artery calcifications.  4.  No acute abnormality otherwise seen with limitation as detailed above.

## 2021-11-08 NOTE — CONSULTS
[facility]  Palliative Care Consultation   Text Page    Assessment & Plan   Cristy Bailey is a 76 year old female who was admitted on 11/6/2021.   Consulted by Hospitalist Arsalan Ng MD to assist with goals of care, POLST assistance.    Recommendations:  1. Goals of Care- No CPR- Do NOT Intubate - Restorative care with request for NO TUBE FEEDING  .  Hospitalization goals discussed patient and her significant other, Diony Akbar.     Decisional Capacity- Intact. Patient does not have an advance directive. Per  informed consent policy next of kin should be involved if patient becomes unable. The patient has three adult children who are next-of-kin. She would like her significant other to be her decision maker, so I have left copies of [employer] Advanced Directive form to complete.       POLST - Complete indicating the patient's preference for SELECTIVE TREATMENT and NO TUBE FEEDING.    2.  Back pain - Patient indicates having back pain from sleeping on the hospital bed. Reasonable to schedule Tylenol 1,000 TID for comfort, and agree with using a small dose of Oxycodone as needed.    Patient's opioid use in past 24 hours: 5 mg Oxycodone = 7.5 mg Daily Morphine Equivalent  [place] Board of Pharmacy Data Base Reviewed:    YES; As expected, no concern for misuse/abuse of controlled medications based on this report. (No prescriptions in the past year.     3.  Wounds - Appreciate input of [facility] Nurse Everardo Pemberton, JEEVAN. This evening the patient is complaining of left hand redness and swollen. I have notified Hospitalist Arsalan Ng MD and ordered topical Sarna for comfort.     4.  Generalized weakness - Appreciate input of Occupational Therapist Alexus Peck, OTR and Physical Therapist Mei Rivera, PT recommendation for TCU.    5. Spiritual Care  Consultation placed for  to follow.  Spiritual Background: Undesignated    6. Care Planning  Appreciate input of   "Kaylynn Lemus, [facility]  Medications for discharge - TBD    Medical Decision Making and Goals of Care:  Discussed on November 8, 2021 with Leana WOOD, CNS:     Met with Cristy as she was resting in bed. She introduced her significant other Leo Akbar and explained that they have been together for 40 years. I explained my role in Palliative Care. Cristy complained about having back pain, and felt that it was due to the hospital (specialty) bed. Cristy attempted to minimize her distress explaining \"You shouldn't be here, so late, all's I need is some Tylenol.\" I asked what would be most helpful in regard to her back pain, and she again minimized her discomfort: \"I can just get my self situated, I'll be find.\" I asked if scheduling extra strength tylenol three times a day would be helpful, which she did agree, but offered \"I don't want to be a bother for anyone\". I offered concern for her skin issues and Cristy explained \"I don't know what happened! I didn't know I was in such tough shape\". She offered that her left hand had become red and swollen in the past half hour and wondered if it was an allergic reaction. I sent a message to her Hospitalist Arsalan Ng MD for input. I also offered concern for her debilitated state when she arrived at the hospital. Cristy agreed \"I couldn't move at all. I don't remember most of it.\" I asked about her goals for the future and she offered \"I just want to get better and go home.\" I inquired about whom would make decisions if she were unable, and she request her significant other Leo. Cristy confirmed that she does not have an Advanced Directive, but would not want to burden her three children with \"worrying about me.\" I printed off a short and long form Advanced Directive for her to complete. Cristy acknowledged the short form would suffice. We also completed a POLST as Cristy confirmed she would not want CPR, intubation or tube feeding.         Thank you for " involving us in the patient's care.     Leana Frank MS, RN, CNS, APRN, ACHPN, FAACVPR  Pain and Palliative Care  Pager [phone]  Office [phone]       Time Spent on this Encounter   Total unit/floor time 4:50 PM until 6:30 PM, time consisted of the following, examination of the patient, reviewing the record and completing documentation. >50% of time spent in counseling and coordination of care, Bedside Nurse Mei Lopez RN and Hospitalist Arsalan Ng MD.  Time spend counseling with patient and significant other (who did not participate in discussion as he attended to his tablet computer) consisted of the following topics, goals of care, advance care planning, education about diagnosis and symptom management.    Understanding of disease process:   This has been discussed with the patient has basic insight to her disease process.    History of Present Illness   History is obtained from the patient and electronic health record    Cristy Bailey is a 76 year old female who was admitted 11/6/2021 via EMS after she slid off her recliner (she has not left her home in 3 years). Her significant other activated EMS as they were not able to assist her from the floor. She was admitted for SIRS versus early sepsis with lactic acidosis. COVID-19 screen was negative.   Her medical history is significant for morbid obesity, hypertension, dyslipidemia, DVT       Past Medical History   I have reviewed this patient's medical history and updated it with pertinent information if needed.   Past Medical History:   Diagnosis Date     Arthritis     hip     HTN (hypertension) 1/2010      Leg weakness 3/24/2015     Lyme disease 1980'S AND 2004    lYME DZ LIKE SXS RESPONDED TO ABX       Past Surgical History   I have reviewed this patient's surgical history and updated it with pertinent information if needed.  Past Surgical History:   Procedure Laterality Date     ARTHROPLASTY HIP Right 7/30/2018    Procedure: ARTHROPLASTY HIP;   Right total hip arthroplasty;  Surgeon: Bry Garcia MD;  Location: [facility] OR     ARTHROPLASTY HIP Left 2/12/2019    Procedure: Left total hip arthroplasty (Akbar and Nephew 60 mm acetabulum with 2 screws; #15 standard neck Synergy stem; +0 neck 36 mm head) (extra difficult case because of her high BMI and deep subcutaneous fatty layer resulting in the operative time at least twice as long from typical operative time);  Surgeon: Chavo Rodriguez MD;  Location: [facility] OR     BIOPSY OF UTERUS LINING  3/2010    negative.      C C-SEC ONLY,PREV C-SEC      c-sec x 3      COLONOSCOPY  2/21/10    benign findings. advised 10 yr f/u.      INCISION AND DRAINAGE HIP, COMBINED Right 8/29/2018    Procedure: COMBINED INCISION AND DRAINAGE HIP;  Incision and debridement, right hip ;  Surgeon: Bry Garcia MD;  Location: [facility] OR     ORTHOPEDIC SURGERY Right 08/29/2018    Right hip I & D 8/26/18, Right JENAE, DOS 7/30/2018, Dr. Garcia. [facility]       Prior to Admission Medications   Prior to Admission Medications   Prescriptions Last Dose Informant Patient Reported? Taking?   albuterol (PROVENTIL) (2.5 MG/3ML) 0.083% neb solution More than a month at Unknown time  No Yes   Sig: Take 1 vial (2.5 mg) by nebulization every 6 hours as needed for shortness of breath / dyspnea or wheezing      Facility-Administered Medications: None     Allergies   Allergies   Allergen Reactions     No Known Drug Allergies        Social History   Living situation: Lives in a trailer home with her significant other, Leo Akbar      Support system: Leo and a sister       Functional status: Wheelchair bound  History of substance use/abuse:  reports that she quit smoking about 37 years ago. She has never used smokeless tobacco.  reports no history of alcohol use.    Family History   I have reviewed this patient's family history and updated it with pertinent information if needed.   Family History   Problem Relation Age of Onset      Cerebrovascular Disease Father         -stroke at age 80     Family History Negative Mother          Diedn her 80's of unknown causes.  Pt otherwise unaware of her health hx.      Unknown/Adopted Mother      Diabetes Brother         (Christian)  of DM complications at age 50.     Alcohol/Drug Brother         Christian     C.A.D. Brother         Christian.      Heart Disease Brother         Christian--MI age 50.     Family History Negative Brother      Family History Negative Brother      Family History Negative Sister      Family History Negative Sister      Family History Negative Son      Family History Negative Daughter      Family History Negative Daughter        Review of Systems   The 10 point Review of Systems is negative other than noted in the HPI or here.     Palliative Symptom Review (0=no symptom/no concern, 1=mild, 2=moderate, 3=severe):      Pain: 2-moderate      Fatigue: 2-moderate      Nausea: 0-none      Constipation: 0-none      Diarrhea: 0-none      Depressive Symptoms: 1-mild      Anxiety: 1-mild      Drowsiness: 0-none      Poor Appetite: 0-none      Shortness of Breath: 0-none      Insomnia: 0-none        Physical Exam   Temp:  [97.2  F (36.2  C)-98.3  F (36.8  C)] 97.2  F (36.2  C)  Pulse:  [74-80] 78  Resp:  [16-18] 18  BP: (107-145)/(49-62) 142/58  SpO2:  [94 %-99 %] 99 %  376 lbs 0 oz  Exam:  GEN:  Morbidly obese  female in moderate distress due to back pain. Alert, oriented x 3.  HEENT:  Normocephalic/atraumatic, no scleral icterus, no nasal discharge, mouth moist.  CV:  RRR, S1, S2; no murmurs or other irregularities noted.  +3 DP/PT pulses bilaterally; bilateral edema of legs.  RESP:  Clear to auscultation bilaterally without rales/rhonchi/wheezing/retractions.  Symmetric chest rise on inhalation noted.  Normal respiratory effort.  ABD:  Rounded, soft, non-tender/non-distended.  +BS  EXT:  Edema & pulses as noted above.  CMS intact x 4.     M/S:   Mid back is tender to  palpation.    SKIN:  Multiple areas of reddened, dry skin on arms and legs.    NEURO:  Sensation to touch intact all extremities.   There is no area of allodynia or hyperesthesia.  PAIN BEHAVIOR: Cooperative  Psych:  Normal affect, but somewhat anxious, cooperative, conversant appropriately.    Data   Results for orders placed or performed during the hospital encounter of 11/06/21   CT Chest/Abdomen/Pelvis w Contrast     Status: None    Narrative    EXAM: CT CHEST/ABDOMEN/PELVIS W CONTRAST  LOCATION: [facility]  DATE/TIME: 11/6/2021 4:30 PM    INDICATION: SOB, history of DVT, D-dimer 1.9, right hip redness, and decubitus, eval for pelvic infection.  COMPARISON: None.  TECHNIQUE: CT scan of the chest, abdomen, and pelvis was performed following injection of IV contrast. Multiplanar reformats were obtained. Dose reduction techniques were used.   CONTRAST: 100 mL Isovue-370    FINDINGS:   LUNGS AND PLEURA: Patchy opacity AP atelectasis at the left base, but developing pneumonia not excluded. No effusions or pneumothorax. Right middle lobe 0.4 cm nodule series 9 image 137. Adjacent calcified granuloma.    MEDIASTINUM/AXILLAE: No acute thoracic aortic abnormality. No pulmonary embolism can be seen. A few small thyroid nodules. No suspicious lymph node.    CORONARY ARTERY CALCIFICATION: Moderate.    HEPATOBILIARY: Cholelithiasis suggested. No acute liver abnormality.    PANCREAS: Normal.    SPLEEN: Normal.    ADRENAL GLANDS: Normal.    KIDNEYS/BLADDER: Normal.    BOWEL: A few mildly distended small bowel loops. No convincing acute bowel abnormality. Limited visualization of the bowel. Appendix cannot be seen. No secondary signs of appendicitis.    LYMPH NODES: Normal.    VASCULATURE: Unremarkable.    PELVIC ORGANS: Obscured for assessment by streak artifact.    MUSCULOSKELETAL: Bilateral hip arthroplasties. No aggressive bone lesion. No clear subcutaneous fluid collections can be seen but assessment  limited by body habitus and streak artifact at the pelvis and abdomen.      Impression    IMPRESSION:  1.  Mild consolidation versus atelectasis of the left lower lobe. Pneumonia at this position is possible.  2.  Cholelithiasis.  3.  Coronary artery calcifications.  4.  No acute abnormality otherwise seen with limitation as detailed above.   Asymptomatic COVID-19 Virus (Coronavirus) by PCR Nasopharyngeal     Status: Normal    Specimen: Nasopharyngeal; Swab   Result Value Ref Range    SARS CoV2 PCR Negative Negative    Narrative    Testing was performed using the godwin  SARS-CoV-2 & Influenza A/B Assay on the godwin  Radha  System.  This test should be ordered for the detection of SARS-COV-2 in individuals who meet SARS-CoV-2 clinical and/or epidemiological criteria. Test performance is unknown in asymptomatic patients.  This test is for in vitro diagnostic use under the FDA EUA for laboratories certified under CLIA to perform moderate and/or high complexity testing. This test has not been FDA cleared or approved.  A negative test does not rule out the presence of PCR inhibitors in the specimen or target RNA in concentration below the limit of detection for the assay. The possibility of a false negative should be considered if the patient's recent exposure or clinical presentation suggests COVID-19.  [facility] are certified under the Clinical Laboratory Improvement Amendments of 1988 (CLIA-88) as qualified to perform moderate and/or high complexity laboratory testing.   Extra Blue Top Tube     Status: None   Result Value Ref Range    Hold Specimen JIC    Extra Green Top (Lithium Heparin) Tube     Status: None   Result Value Ref Range    Hold Specimen JIC    Extra Purple Top Tube     Status: None   Result Value Ref Range    Hold Specimen JIC    Extra Blood Bank Purple Top Tube     Status: None   Result Value Ref Range    Hold Specimen JIC    iStat Gases (lactate) venous, POCT     Status: Abnormal    Result Value Ref Range    Lactic Acid POCT 2.4 (H) <=2.0 mmol/L    Bicarbonate Venous POCT 30 (H) 21 - 28 mmol/L    O2 Sat, Venous POCT 36 (L) 94 - 100 %    pCO2V Venous POCT 44 40 - 50 mm Hg    pH Venous POCT 7.44 (H) 7.32 - 7.43    pO2 Venous POCT 21 (L) 25 - 47 mm Hg   Blood gas venous and oxyhgb     Status: Abnormal   Result Value Ref Range    pH Venous 7.40 7.32 - 7.43    pCO2 Venous 45 40 - 50 mm Hg    pO2 Venous 22 (L) 25 - 47 mm Hg    Bicarbonate Venous 28 21 - 28 mmol/L    FIO2 0     Oxyhemoglobin Venous 42 (L) 70 - 75 %    Base Excess/Deficit (+/-) 2.1 (H) -7.7 - 1.9 mmol/L   Comprehensive metabolic panel     Status: Abnormal   Result Value Ref Range    Sodium 137 133 - 144 mmol/L    Potassium 5.1 3.4 - 5.3 mmol/L    Chloride 108 94 - 109 mmol/L    Carbon Dioxide (CO2) 26 20 - 32 mmol/L    Anion Gap 3 3 - 14 mmol/L    Urea Nitrogen 12 7 - 30 mg/dL    Creatinine 0.57 0.52 - 1.04 mg/dL    Calcium 7.1 (L) 8.5 - 10.1 mg/dL    Glucose 119 (H) 70 - 99 mg/dL    Alkaline Phosphatase 94 40 - 150 U/L    AST 55 (H) 0 - 45 U/L    ALT 32 0 - 50 U/L    Protein Total 6.4 (L) 6.8 - 8.8 g/dL    Albumin 2.1 (L) 3.4 - 5.0 g/dL    Bilirubin Total 1.1 0.2 - 1.3 mg/dL    GFR Estimate >90 >60 mL/min/1.73m2   Lactic acid whole blood     Status: Normal   Result Value Ref Range    Lactic Acid 1.8 0.7 - 2.0 mmol/L   Prolactin     Status: Normal   Result Value Ref Range    Prolactin 7 3 - 27 ug/L   D dimer quantitative     Status: Abnormal   Result Value Ref Range    D-Dimer Quantitative 1.76 (H) 0.00 - 0.50 ug/mL FEU    Narrative    This D-dimer assay is intended for use in conjunction with a clinical pretest probability assessment model to exclude pulmonary embolism (PE) and deep venous thrombosis (DVT) in outpatients suspected of PE or DVT. The cut-off value is 0.50 ug/mL FEU.   Troponin I (now)     Status: Normal   Result Value Ref Range    Troponin I <0.015 0.000 - 0.045 ug/L   CK total     Status: Normal   Result Value Ref  Range     30 - 225 U/L   BNP     Status: Normal   Result Value Ref Range    N terminal Pro BNP Inpatient 718 0 - 900 pg/mL   CRP inflammation     Status: Abnormal   Result Value Ref Range    CRP Inflammation 97.9 (H) 0.0 - 8.0 mg/L   CBC with platelets and differential     Status: Abnormal   Result Value Ref Range    WBC Count 22.2 (H) 4.0 - 11.0 10e3/uL    RBC Count 4.73 3.80 - 5.20 10e6/uL    Hemoglobin 13.8 11.7 - 15.7 g/dL    Hematocrit 44.2 35.0 - 47.0 %    MCV 93 78 - 100 fL    MCH 29.2 26.5 - 33.0 pg    MCHC 31.2 (L) 31.5 - 36.5 g/dL    RDW 13.8 10.0 - 15.0 %    Platelet Count 285 150 - 450 10e3/uL    % Neutrophils 95 %    % Lymphocytes 2 %    % Monocytes 2 %    % Eosinophils 0 %    % Basophils 0 %    % Immature Granulocytes 1 %    NRBCs per 100 WBC 0 <1 /100    Absolute Neutrophils 21.1 (H) 1.6 - 8.3 10e3/uL    Absolute Lymphocytes 0.4 (L) 0.8 - 5.3 10e3/uL    Absolute Monocytes 0.4 0.0 - 1.3 10e3/uL    Absolute Eosinophils 0.0 0.0 - 0.7 10e3/uL    Absolute Basophils 0.0 0.0 - 0.2 10e3/uL    Absolute Immature Granulocytes 0.3 (H) <=0.0 10e3/uL    Absolute NRBCs 0.0 10e3/uL   CBC with platelets and differential     Status: Abnormal   Result Value Ref Range    WBC Count 30.8 (H) 4.0 - 11.0 10e3/uL    RBC Count 4.63 3.80 - 5.20 10e6/uL    Hemoglobin 13.8 11.7 - 15.7 g/dL    Hematocrit 42.9 35.0 - 47.0 %    MCV 93 78 - 100 fL    MCH 29.8 26.5 - 33.0 pg    MCHC 32.2 31.5 - 36.5 g/dL    RDW 14.2 10.0 - 15.0 %    Platelet Count 268 150 - 450 10e3/uL    % Neutrophils 95 %    % Lymphocytes 1 %    % Monocytes 2 %    % Eosinophils 0 %    % Basophils 0 %    % Immature Granulocytes 2 %    NRBCs per 100 WBC 0 <1 /100    Absolute Neutrophils 29.0 (H) 1.6 - 8.3 10e3/uL    Absolute Lymphocytes 0.4 (L) 0.8 - 5.3 10e3/uL    Absolute Monocytes 0.8 0.0 - 1.3 10e3/uL    Absolute Eosinophils 0.0 0.0 - 0.7 10e3/uL    Absolute Basophils 0.1 0.0 - 0.2 10e3/uL    Absolute Immature Granulocytes 0.6 (H) <=0.0 10e3/uL    Absolute  NRBCs 0.0 10e3/uL   UA with Microscopic reflex to Culture     Status: Abnormal    Specimen: Urine, Clean Catch   Result Value Ref Range    Color Urine Yellow Colorless, Straw, Light Yellow, Yellow    Appearance Urine Clear Clear    Glucose Urine Negative Negative mg/dL    Bilirubin Urine Negative Negative    Ketones Urine 10  (A) Negative mg/dL    Specific Gravity Urine 1.010 1.003 - 1.035    Blood Urine Negative Negative    pH Urine 6.0 5.0 - 7.0    Protein Albumin Urine 30  (A) Negative mg/dL    Urobilinogen Urine Normal Normal, 2.0 mg/dL    Nitrite Urine Negative Negative    Leukocyte Esterase Urine Negative Negative    Mucus Urine Present (A) None Seen /LPF    RBC Urine 4 (H) <=2 /HPF    WBC Urine 2 <=5 /HPF    Squamous Epithelials Urine 3 (H) <=1 /HPF    Narrative    Urine Culture not indicated   Creatinine     Status: Normal   Result Value Ref Range    Creatinine 0.67 0.52 - 1.04 mg/dL    GFR Estimate 87 >60 mL/min/1.73m2   Potassium     Status: Normal   Result Value Ref Range    Potassium 4.1 3.4 - 5.3 mmol/L   Magnesium     Status: Normal   Result Value Ref Range    Magnesium 1.7 1.6 - 2.3 mg/dL   Comprehensive metabolic panel     Status: Abnormal   Result Value Ref Range    Sodium 138 133 - 144 mmol/L    Potassium 4.0 3.4 - 5.3 mmol/L    Chloride 107 94 - 109 mmol/L    Carbon Dioxide (CO2) 25 20 - 32 mmol/L    Anion Gap 6 3 - 14 mmol/L    Urea Nitrogen 17 7 - 30 mg/dL    Creatinine 0.80 0.52 - 1.04 mg/dL    Calcium 7.5 (L) 8.5 - 10.1 mg/dL    Glucose 108 (H) 70 - 99 mg/dL    Alkaline Phosphatase 91 40 - 150 U/L    AST 35 0 - 45 U/L    ALT 29 0 - 50 U/L    Protein Total 6.2 (L) 6.8 - 8.8 g/dL    Albumin 2.1 (L) 3.4 - 5.0 g/dL    Bilirubin Total 0.7 0.2 - 1.3 mg/dL    GFR Estimate 73 >60 mL/min/1.73m2   Magnesium     Status: Normal   Result Value Ref Range    Magnesium 1.9 1.6 - 2.3 mg/dL   CBC with platelets and differential     Status: Abnormal   Result Value Ref Range    WBC Count 24.5 (H) 4.0 - 11.0  10e3/uL    RBC Count 4.22 3.80 - 5.20 10e6/uL    Hemoglobin 12.6 11.7 - 15.7 g/dL    Hematocrit 39.9 35.0 - 47.0 %    MCV 95 78 - 100 fL    MCH 29.9 26.5 - 33.0 pg    MCHC 31.6 31.5 - 36.5 g/dL    RDW 14.6 10.0 - 15.0 %    Platelet Count 251 150 - 450 10e3/uL    % Neutrophils 93 %    % Lymphocytes 3 %    % Monocytes 3 %    % Eosinophils 0 %    % Basophils 0 %    % Immature Granulocytes 1 %    NRBCs per 100 WBC 0 <1 /100    Absolute Neutrophils 22.7 (H) 1.6 - 8.3 10e3/uL    Absolute Lymphocytes 0.8 0.8 - 5.3 10e3/uL    Absolute Monocytes 0.7 0.0 - 1.3 10e3/uL    Absolute Eosinophils 0.0 0.0 - 0.7 10e3/uL    Absolute Basophils 0.1 0.0 - 0.2 10e3/uL    Absolute Immature Granulocytes 0.3 (H) <=0.0 10e3/uL    Absolute NRBCs 0.0 10e3/uL   Vancomycin level     Status: Normal   Result Value Ref Range    Vancomycin 17.1   mg/L   Procalcitonin     Status: Abnormal   Result Value Ref Range    Procalcitonin 4.48 (H) <0.05 ng/mL   Magnesium     Status: Normal   Result Value Ref Range    Magnesium 2.2 1.6 - 2.3 mg/dL   Basic metabolic panel     Status: Abnormal   Result Value Ref Range    Sodium 142 133 - 144 mmol/L    Potassium 4.3 3.4 - 5.3 mmol/L    Chloride 111 (H) 94 - 109 mmol/L    Carbon Dioxide (CO2) 25 20 - 32 mmol/L    Anion Gap 6 3 - 14 mmol/L    Urea Nitrogen 18 7 - 30 mg/dL    Creatinine 0.73 0.52 - 1.04 mg/dL    Calcium 7.9 (L) 8.5 - 10.1 mg/dL    Glucose 101 (H) 70 - 99 mg/dL    GFR Estimate 81 >60 mL/min/1.73m2   CRP inflammation     Status: Abnormal   Result Value Ref Range    CRP Inflammation 159.0 (H) 0.0 - 8.0 mg/L   EKG 12 lead     Status: None   Result Value Ref Range    Systolic Blood Pressure  mmHg    Diastolic Blood Pressure  mmHg    Ventricular Rate 98 BPM    Atrial Rate 98 BPM    WI Interval 180 ms    QRS Duration 88 ms     ms    QTc 436 ms    P Axis 55 degrees    R AXIS 17 degrees    T Axis 0 degrees    Interpretation ECG       Sinus rhythm  Inferior infarct , age undetermined  Abnormal ECG  No  previous ECGs available  Confirmed by - EMERGENCY ROOM, PHYSICIAN ([id number]),  STEPHENIE KASPER ([id number]) on 11/8/2021 7:08:11 AM     Care Management / Social Work IP Consult     Status: None ()    Kaylynn Christensen     11/7/2021  2:54 PM  Care Management Initial Consult    General Information  Assessment completed with: Patient,    Type of CM/SW Visit: Initial Assessment    Primary Care Provider verified and updated as needed:     Readmission within the last 30 days:        Reason for Consult: discharge planning  Advance Care Planning:            Communication Assessment  Patient's communication style: spoken language (English or   Bilingual)    Hearing Difficulty or Deaf: no   Wear Glasses or Blind: no    Cognitive  Cognitive/Neuro/Behavioral: .WDL except  Level of Consciousness:   alert  Arousal Level: opens eyes spontaneously  Orientation:   oriented x 4  Mood/Behavior: anxious (crying with most   interactions)  Best Language: 0 - No aphasia  Speech: clear    Living Environment:   People in home: significant other     Current living Arrangements: mobile home      Able to return to prior arrangements:         Family/Social Support:  Care provided by:    Provides care for:    Marital Status: Lives with Significant Other  Significant Other          Description of Support System: Supportive,Involved         Current Resources:   Patient receiving home care services: No     Community Resources:    Equipment currently used at home: grab bar, tub/shower,raised   toilet seat,shower chair,walker, rolling,wheelchair, manual  Supplies currently used at home:      Employment/Financial:  Employment Status:          Financial Concerns: No concerns identified           Lifestyle & Psychosocial Needs:  Social Determinants of Health     Tobacco Use: Medium Risk     Smoking Tobacco Use: Former Smoker     Smokeless Tobacco Use: Never Used   Alcohol Use: Not on file   Financial Resource Strain: Not on file   Food  Insecurity: Not on file   Transportation Needs: Not on file   Physical Activity: Not on file   Stress: Not on file   Social Connections: Not on file   Intimate Partner Violence: Not on file   Depression: Not at risk     PHQ-2 Score: 0   Housing Stability: Not on file       Functional Status:  Prior to admission patient needed assistance: yes          Mental Health Status:  Mental Health Status: No Current Concerns       Chemical Dependency Status:  Chemical Dependency Status: No Current Concerns             Values/Beliefs:  Spiritual, Cultural Beliefs, [employer] Practices, Values that   affect care:                 Additional Information:  Pt reports that she lives with her SO in a mobile home.  She   reports that she uses her manual wc and walker.  Pt reports that   her SO helps assist with all ADL's.  She was agreeable to TCU and   prefers a shared room since insurance covers that. She reports   her SO can provide transportation. [facility]. Referral made. Will need a PAS.   Also asked if client was requesting community resources since it   was indicated on the  consult order, however, she reports she   did not request resources and she did not have any other   concerns/issues.     Kaylynn Lemus MSW, [facility]  Inpatient Care Coordination   [facility]   [phone]     Nutrition Services Adult IP Consult     Status: None ()    Thi Bach RD, LD     11/7/2021  7:40 AM  NUTRITION ASSESSMENT      REASON FOR NUTRITION CONSULT:  Provider Order  -  Malnutrition screen/evaluation      ASSESSMENT:  Unable to complete nutrition assessment due to RD off site and/or   patient unavailable.       FOLLOW UP:   Will follow up when patient and/or RD available in the next 1-2   days.     Thi Conde RD, LD  ICU/3rd Floor RD Pager: [phone]  All Other Floors: [phone]  Weekend RD Pager: [phone]       Adult Type and  Screen     Status: None   Result Value Ref Range    ABO/RH(D) O POS     Antibody Screen Negative Negative    SPECIMEN EXPIRATION DATE [id number]    Blood Culture Arm, Left     Status: Normal (Preliminary result)    Specimen: Arm, Left; Blood   Result Value Ref Range    Culture No growth after 1 day    Blood Culture Arm, Right     Status: Normal (Preliminary result)    Specimen: Arm, Right; Blood   Result Value Ref Range    Culture No growth after 1 day    Extra Tube ([place] Draw)     Status: None    Narrative    The following orders were created for panel order Extra Tube ([place] Draw).  Procedure                               Abnormality         Status                     ---------                               -----------         ------                     Extra Blood Culture Bottle[[id number]]                                                  Extra Blue Top Tube[[id number]]                              Final result               Extra Red Top Tube[[id number]]                                                          Extra Green Top (Lithium...[[id number]]                      Final result               Extra Purple Top Tube[[id number]]                            Final result               Extra Blood Bank Purple ...[[id number]]                      Final result                 Please view results for these tests on the individual orders.   ABO/Rh type and screen     Status: None    Narrative    The following orders were created for panel order ABO/Rh type and screen.  Procedure                               Abnormality         Status                     ---------                               -----------         ------                     Adult Type and Screen[[id number]]                            Final result                 Please view results for these tests on the individual orders.   CBC + differential     Status: Abnormal    Narrative    The following orders were created for panel order CBC +  differential.  Procedure                               Abnormality         Status                     ---------                               -----------         ------                     CBC with platelets and d...[[id number]]  Abnormal            Final result                 Please view results for these tests on the individual orders.   CBC + differential     Status: Abnormal    Narrative    The following orders were created for panel order CBC + differential.  Procedure                               Abnormality         Status                     ---------                               -----------         ------                     CBC with platelets and d...[[id number]]  Abnormal            Final result                 Please view results for these tests on the individual orders.   CBC with platelets differential     Status: Abnormal    Narrative    The following orders were created for panel order CBC with platelets differential.  Procedure                               Abnormality         Status                     ---------                               -----------         ------                     CBC with platelets and d...[[id number]]  Abnormal            Final result                 Please view results for these tests on the individual orders.   CBC with Platelets & Differential *Canceled*     Status: None ()    Narrative    The following orders were created for panel order CBC with Platelets & Differential.  Procedure                               Abnormality         Status                     ---------                               -----------         ------                     CBC with platelets and d...[[id number]]                                                   Please view results for these tests on the individual orders.

## 2021-11-08 NOTE — CONSULTS
"CLINICAL NUTRITION SERVICES  -  ASSESSMENT NOTE      MALNUTRITION:  % Weight Loss: Unable to determine without recent wt trends  % Intake:  No decreased intake noted --> based on report  Subcutaneous Fat Loss:  Upper arm region moderate depletion --> not using as indicator with only 1 region present   Muscle Loss:  Temporal region moderate depletion, Clavicle bone region mild to moderate depletion and Acromion bone region mild to moderate depletion  Fluid Retention: Moderate to severe, generalized    Malnutrition Diagnosis: Unable to determine due to lack of wt trending, unclear if current fluids masking wt loss or if above UBW as there is no point for comparison and patient cannot fully verbalize/report wt trends or history        REASON FOR ASSESSMENT  Cristy Bailey is a 76 year old female seen by Registered Dietitian for Provider Order - \"malnutrition\" (refer to RD brief note 11/07).      PMH of: HTN, obesity, hip surgery, HTN.    Admit 2/2: Fall, FTT, SIRS.    NUTRITION HISTORY  - Information obtained from patient and chart.  - Diet at home: Regular.   - Usual intakes: Meals BID is normal/baseline per report.  - Barriers to PO intakes: Denies having any changes to PO intakes PTA.  Notes she has no concerns with nutrition and has been eating at baseline for \"years\".    - Use of oral supplements: None.  - Chewing/swallowing issues: Denied.  - Allergies: NKFA.      CURRENT NUTRITION ORDERS  Diet Order:     Regular    Current Intake/Tolerance:  100% intakes since admission, limited timeframe.        NUTRITION FOCUSED PHYSICAL ASSESSMENT FOR DIAGNOSING MALNUTRITION)  Yes     Obtained from Chart/Interdisciplinary Team:  - Moderate to severe, generalized edema   - WOCN consulted   - Stooling patterns reviewed    ANTHROPOMETRICS  Height: 5' 7\"  Weight: 376 lbs 0 oz  Body mass index is 58.89 kg/m .  Weight Status:  Obesity Grade III BMI >40  Weight History:  Wt Readings from Last 10 Encounters:   11/08/21 (!) 170.6 kg " (376 lb)   03/01/19 137.2 kg (302 lb 6.4 oz)   02/25/19 137.2 kg (302 lb 6.4 oz)   02/20/19 134.2 kg (295 lb 14.4 oz)   02/17/19 133.1 kg (293 lb 6.4 oz)   01/03/19 130.2 kg (287 lb)   07/28/18 113.4 kg (250 lb)   07/26/18 115.7 kg (255 lb)   06/29/18 115.7 kg (255 lb)   05/24/18 119.3 kg (263 lb)     - Patient denies wt loss PTA.  She denies fit or change in clothing.    - She has no recent wt trending for review.    LABS  Labs reviewed:  Electrolytes  Potassium (mmol/L)   Date Value   11/07/2021 4.0   11/06/2021 4.1   11/06/2021 5.1   02/19/2019 3.9   01/28/2019 4.2   08/01/2018 3.8    Blood Glucose  Glucose (mg/dL)   Date Value   11/07/2021 108 (H)   11/06/2021 119 (H)   02/19/2019 95   02/14/2019 116 (H)   01/28/2019 99   08/30/2018 107 (H)   08/01/2018 111 (H)     Hemoglobin A1C (%)   Date Value   03/24/2015 5.9   05/03/2012 5.8   01/19/2010 5.6    Inflammatory Markers  CRP Inflammation (mg/L)   Date Value   11/06/2021 97.9 (H)   08/31/2018 20.6 (H)   08/25/2017 9.4 (H)   05/03/2012 17.1 (H)     WBC (10e9/L)   Date Value   08/30/2018 8.9   04/30/2018 12.4 (H)   02/08/2018 6.9     WBC Count (10e3/uL)   Date Value   11/07/2021 24.5 (H)   11/06/2021 30.8 (H)   11/06/2021 22.2 (H)     Albumin (g/dL)   Date Value   11/07/2021 2.1 (L)   11/06/2021 2.1 (L)   04/30/2018 3.8   03/25/2014 3.8   03/02/2013 3.9      Magnesium (mg/dL)   Date Value   11/07/2021 1.9   11/06/2021 1.7     Sodium (mmol/L)   Date Value   11/07/2021 138   11/06/2021 137   02/19/2019 141   01/28/2019 140   08/01/2018 139    Renal  Urea Nitrogen (mg/dL)   Date Value   11/07/2021 17   11/06/2021 12   02/19/2019 15   01/28/2019 20   08/01/2018 24     Creatinine (mg/dL)   Date Value   11/07/2021 0.80   11/06/2021 0.67   11/06/2021 0.57   02/19/2019 0.63   02/12/2019 0.71   01/28/2019 0.67     Additional  Triglycerides (mg/dL)   Date Value   03/25/2014 64   03/02/2013 74   05/03/2012 79     Ketones Urine (mg/dL)   Date Value   11/07/2021 10  (A)    08/25/2017 Negative        B/P: 145/60, T: 97.2, P: 75, R: 18      MEDICATIONS  Medications reviewed:    enoxaparin ANTICOAGULANT  40 mg Subcutaneous Q12H     sodium chloride 0.9 %  100 mL Intravenous Once     miconazole   Topical BID     OLANZapine zydis  5 mg Oral At Bedtime     piperacillin-tazobactam  3.375 g Intravenous Q6H     sodium chloride (PF)  10 mL Intracatheter Q8H     sodium chloride (PF)  3 mL Intracatheter Q8H             ASSESSED NUTRITION NEEDS PER APPROVED PRACTICE GUIDELINES:    Dosing Weight 163 kg - dry wt?  Estimated Energy Needs: 15-20 Kcal/Kg  Justification: maintenance, pending WOCN assessment  Estimated Protein Needs: 1-1.2 g pro/Kg  Justification: preservation of lean body mass, pending WOCN assessment  Estimated Fluid Needs: per MD      NUTRITION DIAGNOSIS:  Predicted inadequate nutrient intake (energy/protein) related to potential for decline in PO intakes during admit pending LOS and assessment by WOCN (?protein needs if wounds).    NUTRITION INTERVENTIONS  Recommendations / Nutrition Prescription  Diet per MD.    If found to have PIs, will plan for oral supplement offering (gelatein) and daily MVI/M at follow-up or based on chart review, as able.      Implementation  Nutrition education: Provided education on above.    Nutrition Goals  Patient to consume at least 75% of meals TID while admitted.      MONITORING AND EVALUATION:  Progress towards goals will be monitored and evaluated per protocol and Practice Guidelines          Loretta Degroot RDN, LD  Clinical Dietitian  3rd floor/ICU: [phone]  All other floors: [phone]  Weekend/holiday: [phone]

## 2021-11-08 NOTE — CONSULTS
[facility] Nurse Inpatient Wound Assessment   Reason for consultation: Evaluate and treat  Bilateral buttock wounds    Assessment  Bilateral buttock wounds due to Pressure Injury, Friction and Moisture Associated Skin Damage (MASD)  Status: initial assessment  Multiple scattered partial thickness wounds from buttocks to posterior thighs    Erythema present to folds, miconazole already ordered.      Scattered pinpoint areas of dried drainage to anterior thighs from scratching, no drainage, leave open to air.    Treatment Plan  Bilateral buttocks/perianal/posterior thigh wounds: BID   1. Cleanse with Azra spray and soft dry wipe  2. Apply a thin layer of Azra Antifungal cream to buttocks, coccyx, perianal area and posterior thighs   Orders Written  Recommended provider order: None, at this time  WO Nurse follow-up plan:weekly  Nursing to notify the Provider(s) and re-consult the WO Nurse if wound(s) deteriorates or new skin concern.    Patient History  According to provider note(s):  Cristy Bailey is a 76 year old female longstanding history of hypertension, morbid obesity, prior hip surgery, dyslipidemia, hypertension, reportedly lives in a mobile home setting and moves around utilizing a wheelchair and reports they can earlier from patient's neighbors as per EMS report to ER that patient has not left the house of approximately 3 years already.  EMS personnel was activated earlier after apparently she had a fall event after sliding off from her recliner.  Her significant other attempted to help her but was not able to do so that led to EMS activation.    Objective Data  Containment of urine/stool: Incontinence Protocol    Active Diet Order  Orders Placed This Encounter      Combination Diet Regular Diet Adult      Output:   I/O last 3 completed shifts:  In: 2710 [P.O.:1260; I.V.:1450]  Out: 250 [Urine:250]    Risk Assessment:   Sensory Perception: 4-->no impairment  Moisture: 3-->occasionally moist  Activity:  1-->bedfast  Mobility: 2-->very limited  Nutrition: 3-->adequate  Friction and Shear: 2-->potential problem  Demetrio Score: 15                          Labs:   Recent Labs   Lab 11/08/21  1054 11/07/21  0552   ALBUMIN  --  2.1*   HGB  --  12.6   WBC  --  24.5*   .0*  --        Physical Exam  Areas of skin assessed: focused Buttocks, perianal area    Wound Location:  Buttocks, perianal area  Date of last photo NA-patient unable to tolerate being on side long enough for photo  Wound History: Patient reports spending much of her time in bed or wheelchair  Wound Base: 100 % agranular     Palpation of the wound bed: normal      Drainage: moderate     Description of drainage: serous     Measurements (length x width x depth, in cm) Scattered agranular areas within a [id number] area.  Surrounding this is erythema with satellite lesions     Tunneling N/A     Undermining N/A  Periwound skin: erythema- blanchable      Color: pink      Temperature: normal   Odor: none  Pain: mild    Interventions  Visual inspection and assessment completed   Wound Care Rationale Protect periwound skin and Provide protection   Wound Care: completed by RN  Supplies: ordered: Azra JONES and discussed with RN  Current off-loading measures: Pillows under calves and Pillows  Current support surface: Bariatric Low air loss mattress with pulsation   Education provided to: importance of repositioning, plan of care and Off-loading pressure  Discussed plan of care with Patient and Nurse    Everardo Pemberton RN CWOCN

## 2021-11-08 NOTE — PROGRESS NOTES
Noted order for Midline. At this time a PIV is sufficient to manage current IV needs. Vanco was discontinued.  This has been discussed with Tejal CHEW who also discussed with ordering MD.    Alfredo Sagastume RN on 11/8/2021 at 11:16 AM

## 2021-11-08 NOTE — DISCHARGE INSTRUCTIONS
Bilateral buttocks/perianal/posterior thigh wounds: Twice a day   1. Cleanse with Azra spray and soft dry wipe  2. Apply a thin layer of Azra Antifungal cream to buttocks, coccyx, perianal area and posterior thighs

## 2021-11-09 ENCOUNTER — DOCUMENTATION ONLY (OUTPATIENT)
Dept: OTHER | Facility: CLINIC | Age: 76
End: 2021-11-09
Payer: COMMERCIAL

## 2021-11-09 ENCOUNTER — APPOINTMENT (OUTPATIENT)
Dept: PHYSICAL THERAPY | Facility: CLINIC | Age: 76
DRG: 871 | End: 2021-11-09
Payer: COMMERCIAL

## 2021-11-09 LAB
CREAT SERPL-MCNC: 0.77 MG/DL (ref 0.52–1.04)
GFR SERPL CREATININE-BSD FRML MDRD: 76 ML/MIN/1.73M2
MAGNESIUM SERPL-MCNC: 2.1 MG/DL (ref 1.6–2.3)
PLATELET # BLD AUTO: 284 10E3/UL (ref 150–450)
POTASSIUM BLD-SCNC: 3.5 MMOL/L (ref 3.4–5.3)

## 2021-11-09 PROCEDURE — 85049 AUTOMATED PLATELET COUNT: CPT | Performed by: INTERNAL MEDICINE

## 2021-11-09 PROCEDURE — 258N000003 HC RX IP 258 OP 636: Performed by: INTERNAL MEDICINE

## 2021-11-09 PROCEDURE — 250N000011 HC RX IP 250 OP 636: Performed by: INTERNAL MEDICINE

## 2021-11-09 PROCEDURE — 99233 SBSQ HOSP IP/OBS HIGH 50: CPT | Performed by: INTERNAL MEDICINE

## 2021-11-09 PROCEDURE — 97140 MANUAL THERAPY 1/> REGIONS: CPT | Mod: GP

## 2021-11-09 PROCEDURE — 250N000013 HC RX MED GY IP 250 OP 250 PS 637: Performed by: CLINICAL NURSE SPECIALIST

## 2021-11-09 PROCEDURE — 82565 ASSAY OF CREATININE: CPT | Performed by: INTERNAL MEDICINE

## 2021-11-09 PROCEDURE — 36415 COLL VENOUS BLD VENIPUNCTURE: CPT | Performed by: INTERNAL MEDICINE

## 2021-11-09 PROCEDURE — 120N000001 HC R&B MED SURG/OB

## 2021-11-09 PROCEDURE — 84132 ASSAY OF SERUM POTASSIUM: CPT | Performed by: INTERNAL MEDICINE

## 2021-11-09 PROCEDURE — 97530 THERAPEUTIC ACTIVITIES: CPT | Mod: GP

## 2021-11-09 PROCEDURE — 83735 ASSAY OF MAGNESIUM: CPT | Performed by: INTERNAL MEDICINE

## 2021-11-09 PROCEDURE — 999N000127 HC STATISTIC PERIPHERAL IV START W US GUIDANCE

## 2021-11-09 RX ADMIN — TAZOBACTAM SODIUM AND PIPERACILLIN SODIUM 3.38 G: 375; 3 INJECTION, SOLUTION INTRAVENOUS at 10:57

## 2021-11-09 RX ADMIN — MICONAZOLE NITRATE: 20 POWDER TOPICAL at 09:28

## 2021-11-09 RX ADMIN — ACETAMINOPHEN 1000 MG: 500 TABLET, FILM COATED ORAL at 13:27

## 2021-11-09 RX ADMIN — ACETAMINOPHEN 1000 MG: 500 TABLET, FILM COATED ORAL at 09:07

## 2021-11-09 RX ADMIN — TAZOBACTAM SODIUM AND PIPERACILLIN SODIUM 3.38 G: 375; 3 INJECTION, SOLUTION INTRAVENOUS at 16:20

## 2021-11-09 RX ADMIN — TAZOBACTAM SODIUM AND PIPERACILLIN SODIUM 3.38 G: 375; 3 INJECTION, SOLUTION INTRAVENOUS at 22:40

## 2021-11-09 RX ADMIN — ENOXAPARIN SODIUM 40 MG: 40 INJECTION SUBCUTANEOUS at 06:15

## 2021-11-09 RX ADMIN — TAZOBACTAM SODIUM AND PIPERACILLIN SODIUM 3.38 G: 375; 3 INJECTION, SOLUTION INTRAVENOUS at 04:52

## 2021-11-09 RX ADMIN — ACETAMINOPHEN 1000 MG: 500 TABLET, FILM COATED ORAL at 21:23

## 2021-11-09 RX ADMIN — ENOXAPARIN SODIUM 40 MG: 40 INJECTION SUBCUTANEOUS at 18:01

## 2021-11-09 RX ADMIN — MICONAZOLE NITRATE: 20 POWDER TOPICAL at 05:13

## 2021-11-09 RX ADMIN — CAMPHOR AND MENTHOL: 5; 5 LOTION TOPICAL at 06:15

## 2021-11-09 RX ADMIN — VANCOMYCIN HYDROCHLORIDE 1250 MG: 10 INJECTION, POWDER, LYOPHILIZED, FOR SOLUTION INTRAVENOUS at 12:43

## 2021-11-09 RX ADMIN — MICONAZOLE NITRATE: 20 POWDER TOPICAL at 20:13

## 2021-11-09 ASSESSMENT — ACTIVITIES OF DAILY LIVING (ADL)
ADLS_ACUITY_SCORE: 29
ADLS_ACUITY_SCORE: 23
ADLS_ACUITY_SCORE: 25
ADLS_ACUITY_SCORE: 23
ADLS_ACUITY_SCORE: 25
ADLS_ACUITY_SCORE: 25
ADLS_ACUITY_SCORE: 23
ADLS_ACUITY_SCORE: 29
ADLS_ACUITY_SCORE: 23
ADLS_ACUITY_SCORE: 23
ADLS_ACUITY_SCORE: 25
ADLS_ACUITY_SCORE: 23
ADLS_ACUITY_SCORE: 23
ADLS_ACUITY_SCORE: 25
ADLS_ACUITY_SCORE: 29
ADLS_ACUITY_SCORE: 29
ADLS_ACUITY_SCORE: 23
ADLS_ACUITY_SCORE: 25
ADLS_ACUITY_SCORE: 23
ADLS_ACUITY_SCORE: 23
ADLS_ACUITY_SCORE: 29
ADLS_ACUITY_SCORE: 23
ADLS_ACUITY_SCORE: 25
ADLS_ACUITY_SCORE: 23

## 2021-11-09 ASSESSMENT — MIFFLIN-ST. JEOR: SCORE: 2229.08

## 2021-11-09 NOTE — PHARMACY-VANCOMYCIN DOSING SERVICE
"Pharmacy Vancomycin Note  Date of Service 2021  Patient's  1945   76 year old, female    Indication: Skin and Soft Tissue Infection  Day of Therapy: 3  Current vancomycin regimen:  1250 mg IV q12h  Current vancomycin monitoring method: AUC  Current vancomycin therapeutic monitoring goal: 400-600 mg*h/L    InsightRX Prediction of Current Vancomycin Regimen  Vanco dosing was discontinue earlier today and resumed tonight at same dosing    Current estimated CrCl = Estimated Creatinine Clearance: 112.3 mL/min (based on SCr of 0.73 mg/dL).    Creatinine for last 3 days  2021: 12:54 PM Creatinine 0.57 mg/dL;  1:57 PM Creatinine 0.67 mg/dL  2021:  5:52 AM Creatinine 0.80 mg/dL  2021: 10:54 AM Creatinine 0.73 mg/dL    Recent Vancomycin Levels (past 3 days)  2021:  2:14 PM Vancomycin 17.1 mg/L    Vancomycin IV Administrations (past 72 hours)                   vancomycin 1250 mg in 0.9% NaCl 250 mL intermittent infusion 1,250 mg (mg) 1,250 mg New Bag 21 0608     1,250 mg New Bag 21 1732     1,250 mg New Bag  0400    vancomycin (VANCOCIN) 2,500 mg in sodium chloride 0.9 % 500 mL intermittent infusion (mg) 2,500 mg New Bag 21 1701                Nephrotoxins and other renal medications (From now, onward)    Start     Dose/Rate Route Frequency Ordered Stop    21 2300  vancomycin 1250 mg in 0.9% NaCl 250 mL intermittent infusion 1,250 mg         1,250 mg  over 90 Minutes Intravenous EVERY 12 HOURS 21 2233      21 2200  piperacillin-tazobactam (ZOSYN) infusion 3.375 g        Note to Pharmacy: For [facility], [facility] and [facility]: For Zosyn-naive patients, use the \"Zosyn initial dose + extended infusion\" order panel.    3.375 g  100 mL/hr over 30 Minutes Intravenous EVERY 6 HOURS 21 1917               Contrast Orders - past 72 hours (72h ago, onward)            Start     Dose/Rate Route Frequency Ordered Stop    21 1635  iopamidol (ISOVUE-370) solution 500 mL    "      500 mL Intravenous ONCE 11/06/21 1630 11/06/21 1631          Interpretation of levels and current regimen:  Vancomycin level is reflective of -600    Has serum creatinine changed greater than 50% in last 72 hours: No      Renal Function: Stable    InsightRX Prediction of Planned New Vancomycin Regimen  Vanco dosing was discontinue earlier today and resumed tonight at same dosing    Plan:  1. Continue Current Dose and check a level in 1-2 days  2. Vancomycin monitoring method: AUC  3. Vancomycin therapeutic monitoring goal: 400-600 mg*h/L  4. Pharmacy will check vancomycin levels as appropriate in 1-3 Days.  5. Serum creatinine levels will be ordered a minimum of twice weekly.    Mervin Cedeno [facility]

## 2021-11-09 NOTE — PLAN OF CARE
Alert, oriented x4. Forgetful.   Vitals stable. Denies pain today.   Wounds to body- new redness to left hand overnight and now new redness entire right forearm. No itchiness. Redness. Did note some redness scattered to bad. Did note the redness to Dr. Nieto.   Augie and Zosyn. Ultrasound new iv placed to right arm.   Redness to all screen folds- under breasts, abd folds, leg folds. Redness to right hip and left leg continues. Skin cares completed throughout the day to keep skin clean and dry. Alberto bed pulsate mattress.   Voiding continent and incontinent.   Friend dulce maria here.   Lift used to get in chair.   Lymphedema wraps on. Edema severe to left foot, bilateral legs, hips, buttocks. Obese abd.   Following plan of care.   1800 having some visual hallucinations. She is aware of the hallucinations and telling me she is seeing people in her room even though she is aware no one else in the room. At times her emotions do not match the current situation, tearful easily just with talking to me for example about repositioning in bed. Tears could be when patient is frustrated with her current level of limited mobility and she is used to having more independence compared to today's mobility status needing assistance with all adls.

## 2021-11-09 NOTE — PLAN OF CARE
[id number] RN    A&O with some forgetfulness.  VSS on RA.  Tylenol for pain and PRN ativan for high anxiety.  Following administration of these meds, patient states pain is gone.  Her anxiety is visibly improved as well.  Significantly more calm and less agitated.  Bilateral ACE wraps on lower legs.  +4 edema in left foot, +3 edema in right foot.  Redness seems to have receded from border in some spots.  Significant blanchable redness on backside/ posterior thighs.  Vanco restarted due to red, puffy, warm area on left hand (borders outlined).  Lungs clear, bowels active.  Voiding per purewick.  Full lift.  Minimum of 3 people to turn in bed.  Tolerating regular diet.  Will continue to monitor.

## 2021-11-09 NOTE — ACP (ADVANCE CARE PLANNING)
SPIRITUAL HEALTH SERVICES Progress Note   RH Advance Directive Education    Responded to a consult order for assistance with Advance Care Planning (ACP) for Cristy Bailey.    Shanthi is in the process of completing a healthcare directive (HCD).  Addressed her questions.  She reported that she plans to name her SO Leo as her primary healthcare agent and her children Kelley and Armando as her first and second alternates respectively.  Shanthi wants to talk with her children before adding them to her HCD.     Informed pt that notarization for HCD is available.    Briefly oriented her and Leo to [facility].    This author and other chaplains remain available per pt's request.    Cristhian Swanson M.Div., [facility]  Staff   Phone [phone]

## 2021-11-09 NOTE — PROGRESS NOTES
I was informed by palliative care provider that patient's hand is getting more red and swollen  She is on antibiotics with Zosyn and earlier I discontinued vancomycin given some improvement with her clinical state and still no growth up-to-date with her cultures  However I still have a pending CBC that was ordered earlier this morning.  Not quite certain if her leukocytosis is already downward trending but clearly her CRP is also worsening  Please send the CBC tonight.  If white blood cell count is on the rising trend then may need to restart systemic IV vancomycin.      Please do not hesitate to page hospitalist service if needed

## 2021-11-09 NOTE — PROGRESS NOTES
[facility]  Hospitalist Progress Note  Ana María Nieto MD, MD 11/09/2021    Reason for Stay (Diagnosis): Early sepsis versus SIRS secondary to underlying left lower extremity cellulitis         Assessment and Plan:      Summary of Stay: Cristy Bailey is a 76 year old female longstanding history of hypertension, morbid obesity, prior hip surgery, dyslipidemia, hypertension, reportedly lives in a mobile home setting and moves around utilizing a wheelchair and reports they can earlier from patient's neighbors as per EMS report to ER that patient has not left the house of approximately 3 years already.  EMS personnel was activated earlier after apparently she had a fall event after sliding off from her recliner.  Her significant other attempted to help her but was not able to do so that led to EMS activation.      1.  SIRS versus early sepsis with lactic acidosis, significant leukocytosis, tachycardia 104 bpm with underlying right lower extremity cellulitis, ruling out abscess  -WBC improved.  CRP elevated at 159.  She is currently afebrile.  Blood cultures negative so far.  Will  continue Vancomycin and Zosyn as ordered. Consider stopping vanco tomorrow if cultures remain negative.   -CT of the chest negative for PE but cannot rule out possible consolidation or atelectasis  2.  Fall event with severe physical deconditioning with concomitant failure to thrive  -Continue therapy  3.  Morbid obesity  4.  History of hypertension: Vital signs stable  5.  History of dyslipidemia  6.  Routine COVID-19 screen negative    Code status: DNR/DNI as expressed by the patient    Prophylaxis: Lovenox, high risk for VTE and increased dosing to twice daily frequency, can also apply PCD's  Disposition:  Shanthi will still be needing inpatient care and I am anticipating she will be in the hospital at least in the next 2-3 more days..         Interval History (Subjective):      Patient seen and examined today.  H&P  "and hospital course reviewed.  Patient states that she is feeling much better today.  Denies fever.  No nausea or vomiting.  No abdominal pain.              Physical Exam:      Last Vital Signs:  BP (!) 150/83 (BP Location: Right arm)   Pulse 89   Temp 97.5  F (36.4  C) (Temporal)   Resp 20   Ht 1.702 m (5' 7\")   Wt (!) 169.6 kg (374 lb)   SpO2 97%   BMI 58.58 kg/m      I/O last 3 completed shifts:  In: 440 [P.O.:440]  Out: 100 [Urine:100]  Wt Readings from Last 1 Encounters:   11/09/21 (!) 169.6 kg (374 lb)     Vitals:    11/06/21 1543 11/08/21 0900 11/09/21 0900   Weight: (!) 163 kg (359 lb 5.6 oz) (!) 170.6 kg (376 lb) (!) 169.6 kg (374 lb)       Constitutional: Awake, alert, cooperative, no apparent distress   Respiratory:  Fair air entry, distant breath sounds, no crackles or wheezing   Cardiovascular: Regular rate and rhythm, normal S1 and S2, and no murmur noted   Abdomen: Normal bowel sounds, soft, non-distended, non-tender   Skin:  Multiple skin excoriations on upper and lower extremities, bruises and ecchymosis seem as well   Neuro: Alert and oriented x3, no weakness, spontaneous and coherent speech   Extremities:  Bilateral lymphedema lower extremities, extensive onychomycosis, very dry skin with several excoriations noted,     Decreasing edema on both lower extremities   normal range of motion    This is a photograph during early part of her hospitalization at the time of admission process         Other(s):  Euthymic mood now, not agitated       All other systems: Negative          Medications:      All current medications were reviewed with changes reflected in problem list.         Data:      All new lab and imaging data was reviewed.   Labs:  No results for input(s): CULT in the last 168 hours.  Recent Labs   Lab 11/09/21  0619 11/08/21  1925 11/07/21  0552 11/06/21  1518   WBC  --  9.4 24.5* 30.8*   HGB  --  12.7 12.6 13.8   HCT  --  39.2 39.9 42.9   MCV  --  92 95 93    263 251 268 "     Recent Labs   Lab 11/09/21  0619 11/08/21  1054 11/07/21  0552 11/06/21  1357 11/06/21  1254   NA  --  142 138  --  137   POTASSIUM 3.5 4.3 4.0   < > 5.1   CHLORIDE  --  111* 107  --  108   CO2  --  25 25  --  26   ANIONGAP  --  6 6  --  3   GLC  --  101* 108*  --  119*   BUN  --  18 17  --  12   CR 0.77 0.73 0.80   < > 0.57   GFRESTIMATED 76 81 73   < > >90   ASHIA  --  7.9* 7.5*  --  7.1*   MAG 2.1 2.2 1.9   < >  --    PROTTOTAL  --   --  6.2*  --  6.4*   ALBUMIN  --   --  2.1*  --  2.1*   BILITOTAL  --   --  0.7  --  1.1   ALKPHOS  --   --  91  --  94   AST  --   --  35  --  55*   ALT  --   --  29  --  32    < > = values in this interval not displayed.     Recent Labs   Lab 11/06/21  1254        Recent Labs   Lab 11/06/21  1253   DD 1.76*     Recent Labs   Lab 11/08/21  1054 11/06/21  1254   .0* 97.9*     Recent Labs   Lab 11/08/21  1054 11/07/21  0552 11/06/21  1254   * 108* 119*     No results for input(s): INR in the last 168 hours.  No results for input(s): LIPASE in the last 168 hours.  Recent Labs   Lab 11/06/21  1254   TROPONIN <0.015     Recent Labs   Lab 11/07/21  1148   COLOR Yellow   APPEARANCE Clear   URINEGLC Negative   URINEBILI Negative   URINEKETONE 10 *   SG 1.010   UBLD Negative   URINEPH 6.0   PROTEIN 30 *   NITRITE Negative   LEUKEST Negative   RBCU 4*   WBCU 2      Imaging:   Results for orders placed or performed during the hospital encounter of 11/06/21   CT Chest/Abdomen/Pelvis w Contrast    Narrative    EXAM: CT CHEST/ABDOMEN/PELVIS W CONTRAST  LOCATION: [facility]  DATE/TIME: 11/6/2021 4:30 PM    INDICATION: SOB, history of DVT, D-dimer 1.9, right hip redness, and decubitus, eval for pelvic infection.  COMPARISON: None.  TECHNIQUE: CT scan of the chest, abdomen, and pelvis was performed following injection of IV contrast. Multiplanar reformats were obtained. Dose reduction techniques were used.   CONTRAST: 100 mL Isovue-370    FINDINGS:    LUNGS AND PLEURA: Patchy opacity AP atelectasis at the left base, but developing pneumonia not excluded. No effusions or pneumothorax. Right middle lobe 0.4 cm nodule series 9 image 137. Adjacent calcified granuloma.    MEDIASTINUM/AXILLAE: No acute thoracic aortic abnormality. No pulmonary embolism can be seen. A few small thyroid nodules. No suspicious lymph node.    CORONARY ARTERY CALCIFICATION: Moderate.    HEPATOBILIARY: Cholelithiasis suggested. No acute liver abnormality.    PANCREAS: Normal.    SPLEEN: Normal.    ADRENAL GLANDS: Normal.    KIDNEYS/BLADDER: Normal.    BOWEL: A few mildly distended small bowel loops. No convincing acute bowel abnormality. Limited visualization of the bowel. Appendix cannot be seen. No secondary signs of appendicitis.    LYMPH NODES: Normal.    VASCULATURE: Unremarkable.    PELVIC ORGANS: Obscured for assessment by streak artifact.    MUSCULOSKELETAL: Bilateral hip arthroplasties. No aggressive bone lesion. No clear subcutaneous fluid collections can be seen but assessment limited by body habitus and streak artifact at the pelvis and abdomen.      Impression    IMPRESSION:  1.  Mild consolidation versus atelectasis of the left lower lobe. Pneumonia at this position is possible.  2.  Cholelithiasis.  3.  Coronary artery calcifications.  4.  No acute abnormality otherwise seen with limitation as detailed above.

## 2021-11-09 NOTE — PROGRESS NOTES
I was called to come evaluate patient's left hand and wrist.  Has become acutely erythematous over the course of the afternoon/evening.  She was on Zosyn and vancomycin for leg cellulitis.  Vancomycin was stopped.  This is happened since stopping vancomycin.  There does seem to be warmth and erythema at the base of the thumb wrapping around to the palmar aspect of the wrist.  Is been traced and dated.  I restarted vancomycin.  Reassess tomorrow.

## 2021-11-09 NOTE — PROGRESS NOTES
Care Management Follow Up    Length of Stay (days): 3    Expected Discharge Date: 11/11/2021     Concerns to be Addressed:     Discharge planning  Patient plan of care discussed at interdisciplinary rounds: Yes    Anticipated Discharge Disposition:  Transitional Care     Anticipated Discharge Services: TCU   Anticipated Discharge DME:  none    Patient/family educated on Medicare website which has current facility and service quality ratings:  yes  Education Provided on the Discharge Plan:  yes  Patient/Family in Agreement with the Plan:    yes  Referrals Placed by CM/SW:  Referrals sent to Transitional Care facilities  Private pay costs discussed: transportation costs. Reviewed out of pocket cost for [facility] stretcher transport, $1042.75 for base rate and $25 per mile to the destination. Patient requires stretcher transportation due to weight >350lbs.    Patient anticipates her s.o., Leo, can provide transportation.    Additional Information:  Therapy recommending TCU at discharge. Patient agreeable to TCU placement. States her 1st choice is [facility] d/t it being closest to her home. Additional choices received. Referral also placed to [facility] and [facility]     Will continue to follow for discharge to TCU.    Xiomy Harris RN Case Manager  Inpatient Care Coordination   [facility]   [phone]      Xiomy Harris RN

## 2021-11-10 ENCOUNTER — APPOINTMENT (OUTPATIENT)
Dept: OCCUPATIONAL THERAPY | Facility: CLINIC | Age: 76
DRG: 871 | End: 2021-11-10
Payer: COMMERCIAL

## 2021-11-10 ENCOUNTER — DOCUMENTATION ONLY (OUTPATIENT)
Dept: OTHER | Facility: CLINIC | Age: 76
End: 2021-11-10
Payer: COMMERCIAL

## 2021-11-10 ENCOUNTER — APPOINTMENT (OUTPATIENT)
Dept: PHYSICAL THERAPY | Facility: CLINIC | Age: 76
DRG: 871 | End: 2021-11-10
Payer: COMMERCIAL

## 2021-11-10 LAB
ANION GAP SERPL CALCULATED.3IONS-SCNC: 7 MMOL/L (ref 3–14)
BASOPHILS # BLD AUTO: 0.1 10E3/UL (ref 0–0.2)
BASOPHILS NFR BLD AUTO: 1 %
BUN SERPL-MCNC: 9 MG/DL (ref 7–30)
CALCIUM SERPL-MCNC: 7.6 MG/DL (ref 8.5–10.1)
CHLORIDE BLD-SCNC: 111 MMOL/L (ref 94–109)
CO2 SERPL-SCNC: 28 MMOL/L (ref 20–32)
CREAT SERPL-MCNC: 0.7 MG/DL (ref 0.52–1.04)
CRP SERPL-MCNC: 73.9 MG/L (ref 0–8)
EOSINOPHIL # BLD AUTO: 0.6 10E3/UL (ref 0–0.7)
EOSINOPHIL NFR BLD AUTO: 8 %
ERYTHROCYTE [DISTWIDTH] IN BLOOD BY AUTOMATED COUNT: 14.2 % (ref 10–15)
GFR SERPL CREATININE-BSD FRML MDRD: 86 ML/MIN/1.73M2
GLUCOSE BLD-MCNC: 96 MG/DL (ref 70–99)
HCT VFR BLD AUTO: 42.3 % (ref 35–47)
HGB BLD-MCNC: 13.1 G/DL (ref 11.7–15.7)
IMM GRANULOCYTES # BLD: 0.1 10E3/UL
IMM GRANULOCYTES NFR BLD: 2 %
LYMPHOCYTES # BLD AUTO: 1.1 10E3/UL (ref 0.8–5.3)
LYMPHOCYTES NFR BLD AUTO: 15 %
MAGNESIUM SERPL-MCNC: 2.1 MG/DL (ref 1.6–2.3)
MCH RBC QN AUTO: 29.2 PG (ref 26.5–33)
MCHC RBC AUTO-ENTMCNC: 31 G/DL (ref 31.5–36.5)
MCV RBC AUTO: 94 FL (ref 78–100)
MONOCYTES # BLD AUTO: 0.7 10E3/UL (ref 0–1.3)
MONOCYTES NFR BLD AUTO: 10 %
NEUTROPHILS # BLD AUTO: 4.8 10E3/UL (ref 1.6–8.3)
NEUTROPHILS NFR BLD AUTO: 64 %
NRBC # BLD AUTO: 0 10E3/UL
NRBC BLD AUTO-RTO: 0 /100
PLATELET # BLD AUTO: 311 10E3/UL (ref 150–450)
POTASSIUM BLD-SCNC: 3.4 MMOL/L (ref 3.4–5.3)
POTASSIUM BLD-SCNC: 3.5 MMOL/L (ref 3.4–5.3)
RBC # BLD AUTO: 4.49 10E6/UL (ref 3.8–5.2)
SODIUM SERPL-SCNC: 146 MMOL/L (ref 133–144)
VANCOMYCIN SERPL-MCNC: 22.5 MG/L
WBC # BLD AUTO: 7.3 10E3/UL (ref 4–11)

## 2021-11-10 PROCEDURE — 86140 C-REACTIVE PROTEIN: CPT | Performed by: INTERNAL MEDICINE

## 2021-11-10 PROCEDURE — 85025 COMPLETE CBC W/AUTO DIFF WBC: CPT | Performed by: INTERNAL MEDICINE

## 2021-11-10 PROCEDURE — 250N000013 HC RX MED GY IP 250 OP 250 PS 637: Performed by: INTERNAL MEDICINE

## 2021-11-10 PROCEDURE — 250N000011 HC RX IP 250 OP 636: Performed by: INTERNAL MEDICINE

## 2021-11-10 PROCEDURE — 99207 PR CDG-CUT & PASTE-POTENTIAL IMPACT ON LEVEL: CPT | Performed by: INTERNAL MEDICINE

## 2021-11-10 PROCEDURE — 99233 SBSQ HOSP IP/OBS HIGH 50: CPT | Performed by: INTERNAL MEDICINE

## 2021-11-10 PROCEDURE — 80048 BASIC METABOLIC PNL TOTAL CA: CPT | Performed by: INTERNAL MEDICINE

## 2021-11-10 PROCEDURE — 97535 SELF CARE MNGMENT TRAINING: CPT | Mod: GO

## 2021-11-10 PROCEDURE — 258N000003 HC RX IP 258 OP 636: Performed by: INTERNAL MEDICINE

## 2021-11-10 PROCEDURE — 120N000001 HC R&B MED SURG/OB

## 2021-11-10 PROCEDURE — 97140 MANUAL THERAPY 1/> REGIONS: CPT | Mod: GP

## 2021-11-10 PROCEDURE — 97530 THERAPEUTIC ACTIVITIES: CPT | Mod: GP

## 2021-11-10 PROCEDURE — 250N000013 HC RX MED GY IP 250 OP 250 PS 637: Performed by: CLINICAL NURSE SPECIALIST

## 2021-11-10 PROCEDURE — 80202 ASSAY OF VANCOMYCIN: CPT | Performed by: INTERNAL MEDICINE

## 2021-11-10 PROCEDURE — 84132 ASSAY OF SERUM POTASSIUM: CPT | Performed by: INTERNAL MEDICINE

## 2021-11-10 PROCEDURE — 83735 ASSAY OF MAGNESIUM: CPT | Performed by: INTERNAL MEDICINE

## 2021-11-10 PROCEDURE — 36415 COLL VENOUS BLD VENIPUNCTURE: CPT | Performed by: INTERNAL MEDICINE

## 2021-11-10 RX ORDER — POTASSIUM CHLORIDE 1.5 G/1.58G
40 POWDER, FOR SOLUTION ORAL ONCE
Status: COMPLETED | OUTPATIENT
Start: 2021-11-10 | End: 2021-11-10

## 2021-11-10 RX ADMIN — ACETAMINOPHEN 1000 MG: 500 TABLET, FILM COATED ORAL at 08:32

## 2021-11-10 RX ADMIN — TAZOBACTAM SODIUM AND PIPERACILLIN SODIUM 3.38 G: 375; 3 INJECTION, SOLUTION INTRAVENOUS at 17:08

## 2021-11-10 RX ADMIN — MICONAZOLE NITRATE: 20 POWDER TOPICAL at 08:39

## 2021-11-10 RX ADMIN — TAZOBACTAM SODIUM AND PIPERACILLIN SODIUM 3.38 G: 375; 3 INJECTION, SOLUTION INTRAVENOUS at 23:54

## 2021-11-10 RX ADMIN — ACETAMINOPHEN 1000 MG: 500 TABLET, FILM COATED ORAL at 21:28

## 2021-11-10 RX ADMIN — VANCOMYCIN HYDROCHLORIDE 1250 MG: 10 INJECTION, POWDER, LYOPHILIZED, FOR SOLUTION INTRAVENOUS at 12:53

## 2021-11-10 RX ADMIN — TAZOBACTAM SODIUM AND PIPERACILLIN SODIUM 3.38 G: 375; 3 INJECTION, SOLUTION INTRAVENOUS at 05:25

## 2021-11-10 RX ADMIN — MICONAZOLE NITRATE: 20 POWDER TOPICAL at 21:29

## 2021-11-10 RX ADMIN — VANCOMYCIN HYDROCHLORIDE 1250 MG: 10 INJECTION, POWDER, LYOPHILIZED, FOR SOLUTION INTRAVENOUS at 00:31

## 2021-11-10 RX ADMIN — ACETAMINOPHEN 1000 MG: 500 TABLET, FILM COATED ORAL at 14:11

## 2021-11-10 RX ADMIN — ENOXAPARIN SODIUM 40 MG: 40 INJECTION SUBCUTANEOUS at 05:27

## 2021-11-10 RX ADMIN — POTASSIUM CHLORIDE 40 MEQ: 1.5 POWDER, FOR SOLUTION ORAL at 12:53

## 2021-11-10 RX ADMIN — LORAZEPAM 0.5 MG: 0.5 TABLET ORAL at 21:28

## 2021-11-10 RX ADMIN — TAZOBACTAM SODIUM AND PIPERACILLIN SODIUM 3.38 G: 375; 3 INJECTION, SOLUTION INTRAVENOUS at 11:21

## 2021-11-10 RX ADMIN — ENOXAPARIN SODIUM 40 MG: 40 INJECTION SUBCUTANEOUS at 17:07

## 2021-11-10 ASSESSMENT — ACTIVITIES OF DAILY LIVING (ADL)
ADLS_ACUITY_SCORE: 29

## 2021-11-10 NOTE — ACP (ADVANCE CARE PLANNING)
SPIRITUAL HEALTH SERVICES Progress Note   RH Advance Directive Education    Responded to a consult order for assistance with Advance Care Planning (ACP) for Cristy Bailey.    Facilitated notarization of pt's healthcare directive.     This author and other chaplains remain available per pt's request.    Cristhian Swanson M.Div., [facility]  Staff   Phone [phone]

## 2021-11-10 NOTE — PLAN OF CARE
Pt A&Ox4, VSS, Pain managed w/ Tylenol. Redness to bilateral hands remains, did observe pt itching, barrier cream applied. Redness also to the abd. Pt also has redness to folds antifungal powder applied. Lymphedema wraps on. Edema severe to left foot, bilateral legs, hips, buttocks. Obese abd. No hallucinations during shift. Pure wick in place and voiding well. Up to bedside commode w/ lift and A-2. Had a med BM. Ultrasound IV in place to right arm and TKO. IV vanco and zosyn. Reg. Diet. Will cont to monitor.

## 2021-11-10 NOTE — PHARMACY-VANCOMYCIN DOSING SERVICE
"Pharmacy Vancomycin Note  Date of Service November 10, 2021  Patient's  1945   76 year old, female    Indication: Skin and Soft Tissue Infection  Day of Therapy: 5  Current vancomycin regimen:  1250 mg IV q12h  Current vancomycin monitoring method: AUC  Current vancomycin therapeutic monitoring goal: 400-600 mg*h/L    Insight[employer] Prediction of Current Vancomycin Regimen  Regimen: 1250 mg IV every 12 hours.  Exposure target: AUC24 (range)400-600 mg/L.hr   AUC24,ss: 547 mg/L.hr  Probability of AUC24 > 400: 100 %  Ctrough,ss: 16.5 mg/L  Probability of Ctrough,ss > 20: 13 %  Probability of nephrotoxicity (Lodise TOMÁS ): 12 %      Current estimated CrCl = Estimated Creatinine Clearance: 116.7 mL/min (based on SCr of 0.7 mg/dL).    Creatinine for last 3 days  2021: 10:54 AM Creatinine 0.73 mg/dL  2021:  6:19 AM Creatinine 0.77 mg/dL  11/10/2021:  7:28 AM Creatinine 0.70 mg/dL    Recent Vancomycin Levels (past 3 days)  2021:  2:14 PM Vancomycin 17.1 mg/L  11/10/2021:  7:28 AM Vancomycin 22.5 mg/L    Vancomycin IV Administrations (past 72 hours)                   vancomycin 1250 mg in 0.9% NaCl 250 mL intermittent infusion 1,250 mg (mg) 1,250 mg New Bag 11/10/21 0031     1,250 mg New Bag 21 1243     1,250 mg New Bag 21 2345    vancomycin 1250 mg in 0.9% NaCl 250 mL intermittent infusion 1,250 mg (mg) 1,250 mg New Bag 21 0608     1,250 mg New Bag 21 1732                Nephrotoxins and other renal medications (From now, onward)    Start     Dose/Rate Route Frequency Ordered Stop    21 2300  vancomycin 1250 mg in 0.9% NaCl 250 mL intermittent infusion 1,250 mg         1,250 mg  over 90 Minutes Intravenous EVERY 12 HOURS 21 2233      21 2200  piperacillin-tazobactam (ZOSYN) infusion 3.375 g        Note to Pharmacy: For [facility], [facility] and [facility]: For Zosyn-naive patients, use the \"Zosyn initial dose + extended infusion\" order panel.    3.375 g  100 mL/hr over 30 Minutes " Intravenous EVERY 6 HOURS 11/06/21 1917               Contrast Orders - past 72 hours (72h ago, onward)            None          Interpretation of levels and current regimen:  Vancomycin level is reflective of -600    Has serum creatinine changed greater than 50% in last 72 hours: No    Urine output:  unable to determine    Renal Function: Stable    I      Plan:  1. Continue Current Dose  2. Vancomycin monitoring method: AUC  3. Vancomycin therapeutic monitoring goal: 400-600 mg*h/L  4. Pharmacy will check vancomycin levels as appropriate in 1-3 Days.  5. Serum creatinine levels will be ordered a minimum of twice weekly.    Mayi Mckinley, [facility]

## 2021-11-10 NOTE — PLAN OF CARE
Alert and oriented but forgetful at times. This evening became very tearful and increased confusion, but she had been asleep prior to that episode. Able to calm patient down and reorient her to environment. Assist x2 with lift transfers to chair.  Lymphedema wraps per PT.  Reports some discomfort to her feet, but reports that is at baseline-taking scheduled Tylenol.  Tolerating regular diet. Potassium replaced today.  Continues on IV Zosyn. Will continue to monitor.

## 2021-11-10 NOTE — PROGRESS NOTES
[facility]  Hospitalist Progress Note  Ana María Nieto MD, MD 11/10/2021    Reason for Stay (Diagnosis): Early sepsis versus SIRS secondary to underlying left lower extremity cellulitis         Assessment and Plan:      Summary of Stay: Cristy Bailey is a 76 year old female longstanding history of hypertension, morbid obesity, prior hip surgery, dyslipidemia, hypertension, reportedly lives in a mobile home setting and moves around utilizing a wheelchair and reports they can earlier from patient's neighbors as per EMS report to ER that patient has not left the house of approximately 3 years already.  EMS personnel was activated earlier after apparently she had a fall event after sliding off from her recliner.  Her significant other attempted to help her but was not able to do so that led to EMS activation.      1.  SIRS versus early sepsis with lactic acidosis, significant leukocytosis, tachycardia 104 bpm with underlying right lower extremity cellulitis, ruling out abscess  -WBC improved.  CRP down from 159 to 73.9 today. She is currently afebrile.  Blood cultures negative so far.    -Will  continue Vancomycin and Zosyn as ordered. Will stop vanco today as cultures remain negative.   -CT of the chest negative for PE but cannot rule out possible consolidation or atelectasis    2.  Fall event with severe physical deconditioning with concomitant failure to thrive  -Continue therapy    3.  Morbid obesity  4.  History of hypertension: Vital signs stable  5.  History of dyslipidemia  6.  Routine COVID-19 screen negative    Code status: DNR/DNI as expressed by the patient    Prophylaxis: Lovenox, high risk for VTE and increased dosing to twice daily frequency, can also apply PCD's  Disposition: Anticipate 1-2 more days of hospital course         Interval History (Subjective):      Patient seen and examined today.  H&P and hospital course reviewed.  Patient states that she is feeling much better  "today.  Denies fever.  No nausea or vomiting.  No abdominal pain.              Physical Exam:      Last Vital Signs:  BP (!) 154/70 (BP Location: Left arm)   Pulse 80   Temp 97.5  F (36.4  C) (Temporal)   Resp 20   Ht 1.702 m (5' 7\")   Wt (!) 169.6 kg (374 lb)   SpO2 96%   BMI 58.58 kg/m      I/O last 3 completed shifts:  In: 1240 [P.O.:840; I.V.:400]  Out: 1500 [Urine:1500]  Wt Readings from Last 1 Encounters:   11/09/21 (!) 169.6 kg (374 lb)     Vitals:    11/06/21 1543 11/08/21 0900 11/09/21 0900   Weight: (!) 163 kg (359 lb 5.6 oz) (!) 170.6 kg (376 lb) (!) 169.6 kg (374 lb)       Constitutional: Awake, alert, cooperative, no apparent distress   Respiratory:  Fair air entry, distant breath sounds, no crackles or wheezing   Cardiovascular: Regular rate and rhythm, normal S1 and S2, and no murmur noted   Abdomen: Normal bowel sounds, soft, non-distended, non-tender   Skin:  Multiple skin excoriations on upper and lower extremities, bruises and ecchymosis seem as well   Neuro: Alert and oriented x3, no weakness, spontaneous and coherent speech   Extremities:  Bilateral lymphedema lower extremities, extensive onychomycosis, very dry skin with several excoriations noted,     Decreasing edema on both lower extremities   normal range of motion    This is a photograph during early part of her hospitalization at the time of admission process         Other(s):  Euthymic mood now, not agitated       All other systems: Negative          Medications:      All current medications were reviewed with changes reflected in problem list.         Data:      All new lab and imaging data was reviewed.   Labs:  No results for input(s): CULT in the last 168 hours.  Recent Labs   Lab 11/10/21  0728 11/09/21  0619 11/08/21  1925 11/07/21  0552   WBC 7.3  --  9.4 24.5*   HGB 13.1  --  12.7 12.6   HCT 42.3  --  39.2 39.9   MCV 94  --  92 95    284 263 251     Recent Labs   Lab 11/10/21  0728 11/09/21  0619 11/08/21  1054 " 11/07/21  0552 11/06/21  1357 11/06/21  1254   *  --  142 138  --  137   POTASSIUM 3.4 3.5 4.3 4.0   < > 5.1   CHLORIDE 111*  --  111* 107  --  108   CO2 28  --  25 25  --  26   ANIONGAP 7  --  6 6  --  3   GLC 96  --  101* 108*  --  119*   BUN 9  --  18 17  --  12   CR 0.70 0.77 0.73 0.80   < > 0.57   GFRESTIMATED 86 76 81 73   < > >90   ASHIA 7.6*  --  7.9* 7.5*  --  7.1*   MAG 2.1 2.1 2.2 1.9   < >  --    PROTTOTAL  --   --   --  6.2*  --  6.4*   ALBUMIN  --   --   --  2.1*  --  2.1*   BILITOTAL  --   --   --  0.7  --  1.1   ALKPHOS  --   --   --  91  --  94   AST  --   --   --  35  --  55*   ALT  --   --   --  29  --  32    < > = values in this interval not displayed.     Recent Labs   Lab 11/06/21  1254        Recent Labs   Lab 11/06/21  1253   DD 1.76*     Recent Labs   Lab 11/10/21  0728 11/08/21  1054 11/06/21  1254   CRP 73.9* 159.0* 97.9*     Recent Labs   Lab 11/10/21  0728 11/08/21  1054 11/07/21  0552 11/06/21  1254   GLC 96 101* 108* 119*     No results for input(s): INR in the last 168 hours.  No results for input(s): LIPASE in the last 168 hours.  Recent Labs   Lab 11/06/21  1254   TROPONIN <0.015     Recent Labs   Lab 11/07/21  1148   COLOR Yellow   APPEARANCE Clear   URINEGLC Negative   URINEBILI Negative   URINEKETONE 10 *   SG 1.010   UBLD Negative   URINEPH 6.0   PROTEIN 30 *   NITRITE Negative   LEUKEST Negative   RBCU 4*   WBCU 2      Imaging:   Results for orders placed or performed during the hospital encounter of 11/06/21   CT Chest/Abdomen/Pelvis w Contrast    Narrative    EXAM: CT CHEST/ABDOMEN/PELVIS W CONTRAST  LOCATION: [facility]  DATE/TIME: 11/6/2021 4:30 PM    INDICATION: SOB, history of DVT, D-dimer 1.9, right hip redness, and decubitus, eval for pelvic infection.  COMPARISON: None.  TECHNIQUE: CT scan of the chest, abdomen, and pelvis was performed following injection of IV contrast. Multiplanar reformats were obtained. Dose reduction techniques  were used.   CONTRAST: 100 mL Isovue-370    FINDINGS:   LUNGS AND PLEURA: Patchy opacity AP atelectasis at the left base, but developing pneumonia not excluded. No effusions or pneumothorax. Right middle lobe 0.4 cm nodule series 9 image 137. Adjacent calcified granuloma.    MEDIASTINUM/AXILLAE: No acute thoracic aortic abnormality. No pulmonary embolism can be seen. A few small thyroid nodules. No suspicious lymph node.    CORONARY ARTERY CALCIFICATION: Moderate.    HEPATOBILIARY: Cholelithiasis suggested. No acute liver abnormality.    PANCREAS: Normal.    SPLEEN: Normal.    ADRENAL GLANDS: Normal.    KIDNEYS/BLADDER: Normal.    BOWEL: A few mildly distended small bowel loops. No convincing acute bowel abnormality. Limited visualization of the bowel. Appendix cannot be seen. No secondary signs of appendicitis.    LYMPH NODES: Normal.    VASCULATURE: Unremarkable.    PELVIC ORGANS: Obscured for assessment by streak artifact.    MUSCULOSKELETAL: Bilateral hip arthroplasties. No aggressive bone lesion. No clear subcutaneous fluid collections can be seen but assessment limited by body habitus and streak artifact at the pelvis and abdomen.      Impression    IMPRESSION:  1.  Mild consolidation versus atelectasis of the left lower lobe. Pneumonia at this position is possible.  2.  Cholelithiasis.  3.  Coronary artery calcifications.  4.  No acute abnormality otherwise seen with limitation as detailed above.

## 2021-11-11 ENCOUNTER — APPOINTMENT (OUTPATIENT)
Dept: PHYSICAL THERAPY | Facility: CLINIC | Age: 76
DRG: 871 | End: 2021-11-11
Payer: COMMERCIAL

## 2021-11-11 LAB
ANION GAP SERPL CALCULATED.3IONS-SCNC: 7 MMOL/L (ref 3–14)
BACTERIA BLD CULT: NO GROWTH
BACTERIA BLD CULT: NO GROWTH
BASOPHILS # BLD AUTO: 0.1 10E3/UL (ref 0–0.2)
BASOPHILS NFR BLD AUTO: 1 %
BUN SERPL-MCNC: 8 MG/DL (ref 7–30)
CALCIUM SERPL-MCNC: 7.8 MG/DL (ref 8.5–10.1)
CHLORIDE BLD-SCNC: 109 MMOL/L (ref 94–109)
CO2 SERPL-SCNC: 29 MMOL/L (ref 20–32)
CREAT SERPL-MCNC: 0.77 MG/DL (ref 0.52–1.04)
EOSINOPHIL # BLD AUTO: 0.6 10E3/UL (ref 0–0.7)
EOSINOPHIL NFR BLD AUTO: 8 %
ERYTHROCYTE [DISTWIDTH] IN BLOOD BY AUTOMATED COUNT: 14.6 % (ref 10–15)
GFR SERPL CREATININE-BSD FRML MDRD: 76 ML/MIN/1.73M2
GLUCOSE BLD-MCNC: 103 MG/DL (ref 70–99)
HCT VFR BLD AUTO: 40.8 % (ref 35–47)
HGB BLD-MCNC: 13 G/DL (ref 11.7–15.7)
IMM GRANULOCYTES # BLD: 0.2 10E3/UL
IMM GRANULOCYTES NFR BLD: 3 %
LYMPHOCYTES # BLD AUTO: 1.2 10E3/UL (ref 0.8–5.3)
LYMPHOCYTES NFR BLD AUTO: 17 %
MAGNESIUM SERPL-MCNC: 2.1 MG/DL (ref 1.6–2.3)
MCH RBC QN AUTO: 29.4 PG (ref 26.5–33)
MCHC RBC AUTO-ENTMCNC: 31.9 G/DL (ref 31.5–36.5)
MCV RBC AUTO: 92 FL (ref 78–100)
MONOCYTES # BLD AUTO: 0.6 10E3/UL (ref 0–1.3)
MONOCYTES NFR BLD AUTO: 7 %
NEUTROPHILS # BLD AUTO: 4.8 10E3/UL (ref 1.6–8.3)
NEUTROPHILS NFR BLD AUTO: 64 %
NRBC # BLD AUTO: 0 10E3/UL
NRBC BLD AUTO-RTO: 0 /100
PLATELET # BLD AUTO: 299 10E3/UL (ref 150–450)
POTASSIUM BLD-SCNC: 3.2 MMOL/L (ref 3.4–5.3)
POTASSIUM BLD-SCNC: 3.4 MMOL/L (ref 3.4–5.3)
RBC # BLD AUTO: 4.42 10E6/UL (ref 3.8–5.2)
SODIUM SERPL-SCNC: 145 MMOL/L (ref 133–144)
WBC # BLD AUTO: 7.5 10E3/UL (ref 4–11)

## 2021-11-11 PROCEDURE — 120N000001 HC R&B MED SURG/OB

## 2021-11-11 PROCEDURE — 99233 SBSQ HOSP IP/OBS HIGH 50: CPT | Performed by: INTERNAL MEDICINE

## 2021-11-11 PROCEDURE — 250N000013 HC RX MED GY IP 250 OP 250 PS 637: Performed by: CLINICAL NURSE SPECIALIST

## 2021-11-11 PROCEDURE — 99207 PR CDG-CUT & PASTE-POTENTIAL IMPACT ON LEVEL: CPT | Performed by: INTERNAL MEDICINE

## 2021-11-11 PROCEDURE — 84132 ASSAY OF SERUM POTASSIUM: CPT | Performed by: INTERNAL MEDICINE

## 2021-11-11 PROCEDURE — 97530 THERAPEUTIC ACTIVITIES: CPT | Mod: GP

## 2021-11-11 PROCEDURE — 250N000011 HC RX IP 250 OP 636: Performed by: INTERNAL MEDICINE

## 2021-11-11 PROCEDURE — 97140 MANUAL THERAPY 1/> REGIONS: CPT | Mod: GP

## 2021-11-11 PROCEDURE — 36415 COLL VENOUS BLD VENIPUNCTURE: CPT | Performed by: INTERNAL MEDICINE

## 2021-11-11 PROCEDURE — 83735 ASSAY OF MAGNESIUM: CPT | Performed by: INTERNAL MEDICINE

## 2021-11-11 PROCEDURE — 250N000013 HC RX MED GY IP 250 OP 250 PS 637: Performed by: INTERNAL MEDICINE

## 2021-11-11 PROCEDURE — 82374 ASSAY BLOOD CARBON DIOXIDE: CPT | Performed by: INTERNAL MEDICINE

## 2021-11-11 PROCEDURE — 85025 COMPLETE CBC W/AUTO DIFF WBC: CPT | Performed by: INTERNAL MEDICINE

## 2021-11-11 RX ORDER — CEPHALEXIN 500 MG/1
500 CAPSULE ORAL EVERY 6 HOURS SCHEDULED
Status: DISCONTINUED | OUTPATIENT
Start: 2021-11-11 | End: 2021-11-18

## 2021-11-11 RX ORDER — POTASSIUM CHLORIDE 1500 MG/1
40 TABLET, EXTENDED RELEASE ORAL ONCE
Status: COMPLETED | OUTPATIENT
Start: 2021-11-11 | End: 2021-11-11

## 2021-11-11 RX ADMIN — ACETAMINOPHEN 1000 MG: 500 TABLET, FILM COATED ORAL at 21:57

## 2021-11-11 RX ADMIN — MICONAZOLE NITRATE: 20 POWDER TOPICAL at 22:01

## 2021-11-11 RX ADMIN — MICONAZOLE NITRATE: 20 POWDER TOPICAL at 08:33

## 2021-11-11 RX ADMIN — ENOXAPARIN SODIUM 40 MG: 40 INJECTION SUBCUTANEOUS at 05:32

## 2021-11-11 RX ADMIN — POTASSIUM CHLORIDE 40 MEQ: 1500 TABLET, EXTENDED RELEASE ORAL at 11:00

## 2021-11-11 RX ADMIN — ENOXAPARIN SODIUM 40 MG: 40 INJECTION SUBCUTANEOUS at 18:54

## 2021-11-11 RX ADMIN — CEPHALEXIN 500 MG: 500 CAPSULE ORAL at 18:54

## 2021-11-11 RX ADMIN — ACETAMINOPHEN 1000 MG: 500 TABLET, FILM COATED ORAL at 13:00

## 2021-11-11 RX ADMIN — POTASSIUM CHLORIDE 40 MEQ: 1500 TABLET, EXTENDED RELEASE ORAL at 21:57

## 2021-11-11 RX ADMIN — TAZOBACTAM SODIUM AND PIPERACILLIN SODIUM 3.38 G: 375; 3 INJECTION, SOLUTION INTRAVENOUS at 10:37

## 2021-11-11 RX ADMIN — TAZOBACTAM SODIUM AND PIPERACILLIN SODIUM 3.38 G: 375; 3 INJECTION, SOLUTION INTRAVENOUS at 05:32

## 2021-11-11 RX ADMIN — ACETAMINOPHEN 1000 MG: 500 TABLET, FILM COATED ORAL at 08:33

## 2021-11-11 ASSESSMENT — ACTIVITIES OF DAILY LIVING (ADL)
ADLS_ACUITY_SCORE: 29
ADLS_ACUITY_SCORE: 33
ADLS_ACUITY_SCORE: 29
ADLS_ACUITY_SCORE: 29
ADLS_ACUITY_SCORE: 25
ADLS_ACUITY_SCORE: 25
ADLS_ACUITY_SCORE: 33
ADLS_ACUITY_SCORE: 29
ADLS_ACUITY_SCORE: 33
ADLS_ACUITY_SCORE: 29
ADLS_ACUITY_SCORE: 33
ADLS_ACUITY_SCORE: 29
ADLS_ACUITY_SCORE: 29
ADLS_ACUITY_SCORE: 33
ADLS_ACUITY_SCORE: 29
ADLS_ACUITY_SCORE: 33
ADLS_ACUITY_SCORE: 29
ADLS_ACUITY_SCORE: 29
ADLS_ACUITY_SCORE: 33
ADLS_ACUITY_SCORE: 25
ADLS_ACUITY_SCORE: 33

## 2021-11-11 NOTE — PLAN OF CARE
"Pt A&Ox4, VSS, Pain managed w/ Tylenol. Earlier in the evening, c/o staff no answering call light. Pt stated, \"I have been pushing my call light, no one is answering, there is a leola outside my room that is standing there but is not helping me, my legs are hurting\". Charge nurse updated. Scheduled tylenol and ativan administered, lymphedema wraps were also readjusted. Pt was later less anxious. Pure wick in place and voiding well. Up to bedside commode w/ lift and A-2. Had a med soft BM. Ultrasound IV in place to right arm and TKO. IV zosyn. Reg. Diet. Will cont to monitor.   "

## 2021-11-11 NOTE — PROGRESS NOTES
[facility]  Hospitalist Progress Note  Ana María Nieto MD, MD 11/11/2021    Reason for Stay (Diagnosis): Early sepsis versus SIRS secondary to underlying left lower extremity cellulitis         Assessment and Plan:      Summary of Stay: Cristy Bailey is a 76 year old female longstanding history of hypertension, morbid obesity, prior hip surgery, dyslipidemia, hypertension, reportedly lives in a mobile home setting and moves around utilizing a wheelchair and reports they can earlier from patient's neighbors as per EMS report to ER that patient has not left the house of approximately 3 years already.  EMS personnel was activated earlier after apparently she had a fall event after sliding off from her recliner.  Her significant other attempted to help her but was not able to do so that led to EMS activation.      1.  SIRS versus early sepsis with lactic acidosis, significant leukocytosis, tachycardia 104 bpm with underlying right lower extremity cellulitis, ruling out abscess  -WBC improved.  CRP down from 159 to 73.9 today. She is currently afebrile.  Blood cultures negative so far.    -Treated with IV vancomycin and Zosyn. Significant improvement of her cellulitis. Afebrile. CBC normal. Will transition to po Keflex today  -CT of the chest negative for PE but cannot rule out possible consolidation or atelectasis  -Discharge to TCU once placement available.   2.  Fall event with severe physical deconditioning with concomitant failure to thrive  -Continue therapy  3.  Morbid obesity: Complicating care. BMI 58  4.  History of hypertension: Vital signs stable  5.  History of dyslipidemia  6.  Routine COVID-19 screen negative    Code status: DNR/DNI as expressed by the patient    Prophylaxis: Lovenox, high risk for VTE and increased dosing to twice daily frequency, can also apply PCD's  Disposition: discharge to TCU once placement available.          Interval History (Subjective):      Patient seen  "and examined today.  H&P and hospital course reviewed.  Patient states that she is feeling much better today.  Denies fever.  No nausea or vomiting.  No abdominal pain.              Physical Exam:      Last Vital Signs:  BP (!) 151/63 (BP Location: Left arm)   Pulse 77   Temp 97.3  F (36.3  C) (Temporal)   Resp 18   Ht 1.702 m (5' 7\")   Wt (!) 169.6 kg (374 lb)   SpO2 95%   BMI 58.58 kg/m      I/O last 3 completed shifts:  In: 360 [P.O.:360]  Out: 1500 [Urine:1500]  Wt Readings from Last 1 Encounters:   11/09/21 (!) 169.6 kg (374 lb)     Vitals:    11/06/21 1543 11/08/21 0900 11/09/21 0900   Weight: (!) 163 kg (359 lb 5.6 oz) (!) 170.6 kg (376 lb) (!) 169.6 kg (374 lb)       Constitutional: Awake, alert, cooperative, no apparent distress   Respiratory:  Fair air entry, distant breath sounds, no crackles or wheezing   Cardiovascular: Regular rate and rhythm, normal S1 and S2, and no murmur noted   Abdomen: Normal bowel sounds, soft, non-distended, non-tender   Skin:  Multiple skin excoriations on upper and lower extremities, bruises and ecchymosis seem as well   Neuro: Alert and oriented x3, no weakness, spontaneous and coherent speech   Extremities:  Bilateral lymphedema lower extremities, extensive onychomycosis, very dry skin with several excoriations noted,     Decreasing edema on both lower extremities   normal range of motion    This is a photograph during early part of her hospitalization at the time of admission process         Other(s):  Euthymic mood now, not agitated       All other systems: Negative          Medications:      All current medications were reviewed with changes reflected in problem list.         Data:      All new lab and imaging data was reviewed.   Labs:  No results for input(s): CULT in the last 168 hours.  Recent Labs   Lab 11/11/21  0740 11/10/21  0728 11/09/21  0619 11/08/21  1925   WBC 7.5 7.3  --  9.4   HGB 13.0 13.1  --  12.7   HCT 40.8 42.3  --  39.2   MCV 92 94  --  92 "    311 284 263     Recent Labs   Lab 11/11/21  0740 11/10/21  1706 11/10/21  0728 11/09/21  0619 11/08/21  1054 11/07/21  0552 11/06/21  1357 11/06/21  1254   *  --  146*  --  142 138  --  137   POTASSIUM 3.2* 3.5 3.4 3.5 4.3 4.0   < > 5.1   CHLORIDE 109  --  111*  --  111* 107  --  108   CO2 29  --  28  --  25 25  --  26   ANIONGAP 7  --  7  --  6 6  --  3   *  --  96  --  101* 108*  --  119*   BUN 8  --  9  --  18 17  --  12   CR 0.77  --  0.70 0.77 0.73 0.80   < > 0.57   GFRESTIMATED 76  --  86 76 81 73   < > >90   ASHIA 7.8*  --  7.6*  --  7.9* 7.5*  --  7.1*   MAG 2.1  --  2.1 2.1 2.2 1.9   < >  --    PROTTOTAL  --   --   --   --   --  6.2*  --  6.4*   ALBUMIN  --   --   --   --   --  2.1*  --  2.1*   BILITOTAL  --   --   --   --   --  0.7  --  1.1   ALKPHOS  --   --   --   --   --  91  --  94   AST  --   --   --   --   --  35  --  55*   ALT  --   --   --   --   --  29  --  32    < > = values in this interval not displayed.     Recent Labs   Lab 11/06/21  1254        Recent Labs   Lab 11/06/21  1253   DD 1.76*     Recent Labs   Lab 11/10/21  0728 11/08/21  1054 11/06/21  1254   CRP 73.9* 159.0* 97.9*     Recent Labs   Lab 11/11/21  0740 11/10/21  0728 11/08/21  1054 11/07/21  0552 11/06/21  1254   * 96 101* 108* 119*     No results for input(s): INR in the last 168 hours.  No results for input(s): LIPASE in the last 168 hours.  Recent Labs   Lab 11/06/21  1254   TROPONIN <0.015     Recent Labs   Lab 11/07/21  1148   COLOR Yellow   APPEARANCE Clear   URINEGLC Negative   URINEBILI Negative   URINEKETONE 10 *   SG 1.010   UBLD Negative   URINEPH 6.0   PROTEIN 30 *   NITRITE Negative   LEUKEST Negative   RBCU 4*   WBCU 2      Imaging:   Results for orders placed or performed during the hospital encounter of 11/06/21   CT Chest/Abdomen/Pelvis w Contrast    Narrative    EXAM: CT CHEST/ABDOMEN/PELVIS W CONTRAST  LOCATION: [facility]  DATE/TIME: 11/6/2021 4:30  PM    INDICATION: SOB, history of DVT, D-dimer 1.9, right hip redness, and decubitus, eval for pelvic infection.  COMPARISON: None.  TECHNIQUE: CT scan of the chest, abdomen, and pelvis was performed following injection of IV contrast. Multiplanar reformats were obtained. Dose reduction techniques were used.   CONTRAST: 100 mL Isovue-370    FINDINGS:   LUNGS AND PLEURA: Patchy opacity AP atelectasis at the left base, but developing pneumonia not excluded. No effusions or pneumothorax. Right middle lobe 0.4 cm nodule series 9 image 137. Adjacent calcified granuloma.    MEDIASTINUM/AXILLAE: No acute thoracic aortic abnormality. No pulmonary embolism can be seen. A few small thyroid nodules. No suspicious lymph node.    CORONARY ARTERY CALCIFICATION: Moderate.    HEPATOBILIARY: Cholelithiasis suggested. No acute liver abnormality.    PANCREAS: Normal.    SPLEEN: Normal.    ADRENAL GLANDS: Normal.    KIDNEYS/BLADDER: Normal.    BOWEL: A few mildly distended small bowel loops. No convincing acute bowel abnormality. Limited visualization of the bowel. Appendix cannot be seen. No secondary signs of appendicitis.    LYMPH NODES: Normal.    VASCULATURE: Unremarkable.    PELVIC ORGANS: Obscured for assessment by streak artifact.    MUSCULOSKELETAL: Bilateral hip arthroplasties. No aggressive bone lesion. No clear subcutaneous fluid collections can be seen but assessment limited by body habitus and streak artifact at the pelvis and abdomen.      Impression    IMPRESSION:  1.  Mild consolidation versus atelectasis of the left lower lobe. Pneumonia at this position is possible.  2.  Cholelithiasis.  3.  Coronary artery calcifications.  4.  No acute abnormality otherwise seen with limitation as detailed above.

## 2021-11-11 NOTE — PLAN OF CARE
Pt is A/O x4 but can be forgetful at times. VSS. A2 with lift. Pulsate mattress in place. Incontinent of B&B at times, urgency present Purewick in place. Lymph wraps on BLE, edema and redness improving. ABX changed to oral Keflex. Mag and K protocol. Replaced K, recheck at 1500. Mag check tomorrow. Up in chair this afternoon. Waiting for TCU placement.

## 2021-11-12 ENCOUNTER — APPOINTMENT (OUTPATIENT)
Dept: PHYSICAL THERAPY | Facility: CLINIC | Age: 76
DRG: 871 | End: 2021-11-12
Payer: COMMERCIAL

## 2021-11-12 ENCOUNTER — APPOINTMENT (OUTPATIENT)
Dept: OCCUPATIONAL THERAPY | Facility: CLINIC | Age: 76
DRG: 871 | End: 2021-11-12
Payer: COMMERCIAL

## 2021-11-12 LAB
CREAT SERPL-MCNC: 0.69 MG/DL (ref 0.52–1.04)
GFR SERPL CREATININE-BSD FRML MDRD: 86 ML/MIN/1.73M2
MAGNESIUM SERPL-MCNC: 2 MG/DL (ref 1.6–2.3)
PLATELET # BLD AUTO: 320 10E3/UL (ref 150–450)
POTASSIUM BLD-SCNC: 3.5 MMOL/L (ref 3.4–5.3)
POTASSIUM BLD-SCNC: 3.6 MMOL/L (ref 3.4–5.3)

## 2021-11-12 PROCEDURE — 120N000001 HC R&B MED SURG/OB

## 2021-11-12 PROCEDURE — 250N000011 HC RX IP 250 OP 636: Performed by: INTERNAL MEDICINE

## 2021-11-12 PROCEDURE — 97140 MANUAL THERAPY 1/> REGIONS: CPT | Mod: GP | Performed by: PHYSICAL THERAPIST

## 2021-11-12 PROCEDURE — 36415 COLL VENOUS BLD VENIPUNCTURE: CPT | Performed by: INTERNAL MEDICINE

## 2021-11-12 PROCEDURE — 250N000013 HC RX MED GY IP 250 OP 250 PS 637: Performed by: INTERNAL MEDICINE

## 2021-11-12 PROCEDURE — 99207 PR CDG-MDM COMPONENT: MEETS MODERATE - DOWN CODED: CPT | Performed by: INTERNAL MEDICINE

## 2021-11-12 PROCEDURE — 84132 ASSAY OF SERUM POTASSIUM: CPT | Performed by: INTERNAL MEDICINE

## 2021-11-12 PROCEDURE — 85049 AUTOMATED PLATELET COUNT: CPT | Performed by: INTERNAL MEDICINE

## 2021-11-12 PROCEDURE — 82565 ASSAY OF CREATININE: CPT | Performed by: INTERNAL MEDICINE

## 2021-11-12 PROCEDURE — 97530 THERAPEUTIC ACTIVITIES: CPT | Mod: GO | Performed by: REHABILITATION PRACTITIONER

## 2021-11-12 PROCEDURE — 250N000013 HC RX MED GY IP 250 OP 250 PS 637: Performed by: CLINICAL NURSE SPECIALIST

## 2021-11-12 PROCEDURE — 83735 ASSAY OF MAGNESIUM: CPT | Performed by: INTERNAL MEDICINE

## 2021-11-12 PROCEDURE — 99232 SBSQ HOSP IP/OBS MODERATE 35: CPT | Performed by: INTERNAL MEDICINE

## 2021-11-12 PROCEDURE — 97530 THERAPEUTIC ACTIVITIES: CPT | Mod: GP | Performed by: PHYSICAL THERAPIST

## 2021-11-12 RX ADMIN — ACETAMINOPHEN 1000 MG: 500 TABLET, FILM COATED ORAL at 13:07

## 2021-11-12 RX ADMIN — CEPHALEXIN 500 MG: 500 CAPSULE ORAL at 00:39

## 2021-11-12 RX ADMIN — ACETAMINOPHEN 1000 MG: 500 TABLET, FILM COATED ORAL at 08:21

## 2021-11-12 RX ADMIN — ENOXAPARIN SODIUM 40 MG: 40 INJECTION SUBCUTANEOUS at 17:29

## 2021-11-12 RX ADMIN — CEPHALEXIN 500 MG: 500 CAPSULE ORAL at 11:15

## 2021-11-12 RX ADMIN — ACETAMINOPHEN 1000 MG: 500 TABLET, FILM COATED ORAL at 20:20

## 2021-11-12 RX ADMIN — ENOXAPARIN SODIUM 40 MG: 40 INJECTION SUBCUTANEOUS at 05:19

## 2021-11-12 RX ADMIN — CEPHALEXIN 500 MG: 500 CAPSULE ORAL at 17:29

## 2021-11-12 RX ADMIN — MICONAZOLE NITRATE: 20 POWDER TOPICAL at 08:21

## 2021-11-12 RX ADMIN — CEPHALEXIN 500 MG: 500 CAPSULE ORAL at 05:19

## 2021-11-12 RX ADMIN — MICONAZOLE NITRATE: 20 POWDER TOPICAL at 20:21

## 2021-11-12 ASSESSMENT — ACTIVITIES OF DAILY LIVING (ADL)
ADLS_ACUITY_SCORE: 25
ADLS_ACUITY_SCORE: 27
ADLS_ACUITY_SCORE: 25
ADLS_ACUITY_SCORE: 27
ADLS_ACUITY_SCORE: 25
ADLS_ACUITY_SCORE: 27
ADLS_ACUITY_SCORE: 25
ADLS_ACUITY_SCORE: 25
ADLS_ACUITY_SCORE: 27
ADLS_ACUITY_SCORE: 25
ADLS_ACUITY_SCORE: 27
ADLS_ACUITY_SCORE: 25
ADLS_ACUITY_SCORE: 25
ADLS_ACUITY_SCORE: 27
ADLS_ACUITY_SCORE: 25
ADLS_ACUITY_SCORE: 25

## 2021-11-12 NOTE — PROGRESS NOTES
[facility]  Hospitalist Progress Note  Ana María Nieto MD, MD 11/12/2021    Reason for Stay (Diagnosis): Early sepsis versus SIRS secondary to underlying left lower extremity cellulitis         Assessment and Plan:      Summary of Stay: Cristy Bailey is a 76 year old female longstanding history of hypertension, morbid obesity, prior hip surgery, dyslipidemia, hypertension, reportedly lives in a mobile home setting and moves around utilizing a wheelchair and reports they can earlier from patient's neighbors as per EMS report to ER that patient has not left the house of approximately 3 years already.  EMS personnel was activated earlier after apparently she had a fall event after sliding off from her recliner.  Her significant other attempted to help her but was not able to do so that led to EMS activation.      1.  SIRS versus early sepsis with lactic acidosis, significant leukocytosis, tachycardia 104 bpm with underlying right lower extremity cellulitis, ruling out abscess  -WBC improved. CRP down from 159 to 73.9 today. She is currently afebrile.  Blood cultures negative so far.    -Treated with IV vancomycin and Zosyn. Significant improvement of her cellulitis. Afebrile. CBC normal.  Transitioned to po Keflex on 11/11.  We will continue Keflex for 3 more days to complete 10-day course of antibiotic treatment.  -CT of the chest negative for PE but cannot rule out possible consolidation or atelectasis  -Discharge to TCU once placement available.   2.  Fall event with severe physical deconditioning with concomitant failure to thrive  -Continue therapy  3.  Morbid obesity: Complicating care. BMI 58  4.  History of hypertension: Vital signs stable  5.  History of dyslipidemia  6.  Routine COVID-19 screen negative    Code status: DNR/DNI as expressed by the patient    Prophylaxis: Lovenox, high risk for VTE and increased dosing to twice daily frequency, can also apply PCD's  Disposition:  "discharge to TCU once placement available.          Interval History (Subjective):      Patient seen and examined today.  She stated that she is feeling much better.  She denies cough or fever.  No shortness of breath.  Hand redness improving.  No nausea or vomiting.              Physical Exam:      Last Vital Signs:  BP (!) 147/58 (BP Location: Left arm)   Pulse 66   Temp 97.4  F (36.3  C) (Axillary)   Resp 18   Ht 1.702 m (5' 7\")   Wt (!) 169.6 kg (374 lb)   SpO2 95%   BMI 58.58 kg/m      I/O last 3 completed shifts:  In: -   Out: 1400 [Urine:1400]  Wt Readings from Last 1 Encounters:   11/09/21 (!) 169.6 kg (374 lb)     Vitals:    11/06/21 1543 11/08/21 0900 11/09/21 0900   Weight: (!) 163 kg (359 lb 5.6 oz) (!) 170.6 kg (376 lb) (!) 169.6 kg (374 lb)       Constitutional: Awake, alert, cooperative, no apparent distress   Respiratory:  Fair air entry, distant breath sounds, no crackles or wheezing   Cardiovascular: Regular rate and rhythm, normal S1 and S2, and no murmur noted   Abdomen: Normal bowel sounds, soft, non-distended, non-tender   Skin:  Multiple skin excoriations on upper and lower extremities, bruises and ecchymosis seem as well   Neuro: Alert and oriented x3, no weakness, spontaneous and coherent speech   Extremities:  Bilateral lymphedema lower extremities, extensive onychomycosis, very dry skin with several excoriations noted,     Decreasing edema on both lower extremities   normal range of motion    This is a photograph during early part of her hospitalization at the time of admission process         Other(s):  Euthymic mood now, not agitated       All other systems: Negative          Medications:      All current medications were reviewed with changes reflected in problem list.         Data:      All new lab and imaging data was reviewed.   Labs:  No results for input(s): CULT in the last 168 hours.  Recent Labs   Lab 11/12/21  0719 11/11/21  0740 11/10/21  0728 11/09/21  0619 " 11/08/21  1925   WBC  --  7.5 7.3  --  9.4   HGB  --  13.0 13.1  --  12.7   HCT  --  40.8 42.3  --  39.2   MCV  --  92 94  --  92    299 311   < > 263    < > = values in this interval not displayed.     Recent Labs   Lab 11/12/21  0719 11/12/21  0150 11/11/21  1504 11/11/21  0740 11/10/21  1706 11/10/21  0728 11/09/21  0619 11/08/21  1054 11/07/21  0552 11/06/21  1357 11/06/21  1254   NA  --   --   --  145*  --  146*  --  142 138  --  137   POTASSIUM 3.6 3.5 3.4 3.2*   < > 3.4   < > 4.3 4.0   < > 5.1   CHLORIDE  --   --   --  109  --  111*  --  111* 107  --  108   CO2  --   --   --  29  --  28  --  25 25  --  26   ANIONGAP  --   --   --  7  --  7  --  6 6  --  3   GLC  --   --   --  103*  --  96  --  101* 108*  --  119*   BUN  --   --   --  8  --  9  --  18 17  --  12   CR 0.69  --   --  0.77  --  0.70   < > 0.73 0.80   < > 0.57   GFRESTIMATED 86  --   --  76  --  86   < > 81 73   < > >90   ASHIA  --   --   --  7.8*  --  7.6*  --  7.9* 7.5*  --  7.1*   MAG 2.0  --   --  2.1  --  2.1   < > 2.2 1.9   < >  --    PROTTOTAL  --   --   --   --   --   --   --   --  6.2*  --  6.4*   ALBUMIN  --   --   --   --   --   --   --   --  2.1*  --  2.1*   BILITOTAL  --   --   --   --   --   --   --   --  0.7  --  1.1   ALKPHOS  --   --   --   --   --   --   --   --  91  --  94   AST  --   --   --   --   --   --   --   --  35  --  55*   ALT  --   --   --   --   --   --   --   --  29  --  32    < > = values in this interval not displayed.     Recent Labs   Lab 11/06/21  1254        Recent Labs   Lab 11/06/21  1253   DD 1.76*     Recent Labs   Lab 11/10/21  0728 11/08/21  1054 11/06/21  1254   CRP 73.9* 159.0* 97.9*     Recent Labs   Lab 11/11/21  0740 11/10/21  0728 11/08/21  1054 11/07/21  0552 11/06/21  1254   * 96 101* 108* 119*     No results for input(s): INR in the last 168 hours.  No results for input(s): LIPASE in the last 168 hours.  Recent Labs   Lab 11/06/21  1254   TROPONIN <0.015     Recent Labs   Lab  11/07/21  1148   COLOR Yellow   APPEARANCE Clear   URINEGLC Negative   URINEBILI Negative   URINEKETONE 10 *   SG 1.010   UBLD Negative   URINEPH 6.0   PROTEIN 30 *   NITRITE Negative   LEUKEST Negative   RBCU 4*   WBCU 2      Imaging:   Results for orders placed or performed during the hospital encounter of 11/06/21   CT Chest/Abdomen/Pelvis w Contrast    Narrative    EXAM: CT CHEST/ABDOMEN/PELVIS W CONTRAST  LOCATION: [facility]  DATE/TIME: 11/6/2021 4:30 PM    INDICATION: SOB, history of DVT, D-dimer 1.9, right hip redness, and decubitus, eval for pelvic infection.  COMPARISON: None.  TECHNIQUE: CT scan of the chest, abdomen, and pelvis was performed following injection of IV contrast. Multiplanar reformats were obtained. Dose reduction techniques were used.   CONTRAST: 100 mL Isovue-370    FINDINGS:   LUNGS AND PLEURA: Patchy opacity AP atelectasis at the left base, but developing pneumonia not excluded. No effusions or pneumothorax. Right middle lobe 0.4 cm nodule series 9 image 137. Adjacent calcified granuloma.    MEDIASTINUM/AXILLAE: No acute thoracic aortic abnormality. No pulmonary embolism can be seen. A few small thyroid nodules. No suspicious lymph node.    CORONARY ARTERY CALCIFICATION: Moderate.    HEPATOBILIARY: Cholelithiasis suggested. No acute liver abnormality.    PANCREAS: Normal.    SPLEEN: Normal.    ADRENAL GLANDS: Normal.    KIDNEYS/BLADDER: Normal.    BOWEL: A few mildly distended small bowel loops. No convincing acute bowel abnormality. Limited visualization of the bowel. Appendix cannot be seen. No secondary signs of appendicitis.    LYMPH NODES: Normal.    VASCULATURE: Unremarkable.    PELVIC ORGANS: Obscured for assessment by streak artifact.    MUSCULOSKELETAL: Bilateral hip arthroplasties. No aggressive bone lesion. No clear subcutaneous fluid collections can be seen but assessment limited by body habitus and streak artifact at the pelvis and abdomen.       Impression    IMPRESSION:  1.  Mild consolidation versus atelectasis of the left lower lobe. Pneumonia at this position is possible.  2.  Cholelithiasis.  3.  Coronary artery calcifications.  4.  No acute abnormality otherwise seen with limitation as detailed above.

## 2021-11-12 NOTE — PROGRESS NOTES
Care Management Follow Up    Length of Stay (days): 6    Expected Discharge Date: 11/13/2021     Concerns to be Addressed:       Patient plan of care discussed at interdisciplinary rounds: Yes    Anticipated Discharge Disposition:  11/12/2021     Anticipated Discharge Services:  TCU  Anticipated Discharge DME:  none    Patient/family educated on Medicare website which has current facility and service quality ratings:  yes  Education Provided on the Discharge Plan:  yes  Patient/Family in Agreement with the Plan:  yes    Referrals Placed by CM/SW:  SW sent referrals to TCU's that accept bariatric patients.       Private pay costs discussed: private room/amenity fees and transportation costs    Additional Information:  SW sent more referrals to TCU's that accept patients over 300 pounds.     Addendum: [facility] called and said they had no beds.                         [facility] called and there are no bariatric beds.     JOSE GUADALUPE Schmitt

## 2021-11-12 NOTE — PLAN OF CARE
"BP (!) 147/58 (BP Location: Left arm)   Pulse 66   Temp 97.4  F (36.3  C) (Axillary)   Resp 18   Ht 1.702 m (5' 7\")   Wt (!) 169.6 kg (374 lb)   SpO2 95%   BMI 58.58 kg/m      Pt A&Ox4 with forgetulness. VSS. BLE pain managed with scheduled tylenol. Bilateral lymphedema wraps changed by PT today. Requires lift transfer. Pulsate mattress. Incont of B&B. 1 semi-loose stool today. K/mag protocol. Loss of PIV & pt is a difficult stick. MD updated. Medically stable for discharge, awaiting TCU placement. Will cont POC.    Carolina Goldberg RN     "

## 2021-11-12 NOTE — PLAN OF CARE
Pt A/O x4, forgetful at times. VSS on RA. Requiring lift for transfers. Pulsate mattress. CMS intact. Wraps to BLE. Purewick in place, voiding. Tolerating regular diet but decreased appetite. K+ rechecked at 0200, 3.5. No replacement and recheck at 0600 this morning. Plan for TCU at discharge. Will continue to monitor.

## 2021-11-12 NOTE — PLAN OF CARE
Patient A&Ox4, tylenol used for pain control, transfers using a lift. Tolerated regular diet, is using a pure wick and is voiding adequately, had a bowel movement today, has lymp wraps on both legs, potassium replaced tonight recheck at 2am. Awaiting TCU placement.

## 2021-11-13 LAB
CREAT SERPL-MCNC: 0.66 MG/DL (ref 0.52–1.04)
GFR SERPL CREATININE-BSD FRML MDRD: 87 ML/MIN/1.73M2
HOLD SPECIMEN: NORMAL

## 2021-11-13 PROCEDURE — 36415 COLL VENOUS BLD VENIPUNCTURE: CPT | Performed by: INTERNAL MEDICINE

## 2021-11-13 PROCEDURE — 82565 ASSAY OF CREATININE: CPT | Performed by: INTERNAL MEDICINE

## 2021-11-13 PROCEDURE — 250N000013 HC RX MED GY IP 250 OP 250 PS 637: Performed by: CLINICAL NURSE SPECIALIST

## 2021-11-13 PROCEDURE — 250N000011 HC RX IP 250 OP 636: Performed by: INTERNAL MEDICINE

## 2021-11-13 PROCEDURE — 99207 PR CDG-CUT & PASTE-POTENTIAL IMPACT ON LEVEL: CPT | Performed by: INTERNAL MEDICINE

## 2021-11-13 PROCEDURE — 250N000013 HC RX MED GY IP 250 OP 250 PS 637: Performed by: INTERNAL MEDICINE

## 2021-11-13 PROCEDURE — 120N000001 HC R&B MED SURG/OB

## 2021-11-13 PROCEDURE — 99232 SBSQ HOSP IP/OBS MODERATE 35: CPT | Performed by: INTERNAL MEDICINE

## 2021-11-13 RX ADMIN — ACETAMINOPHEN 1000 MG: 500 TABLET, FILM COATED ORAL at 08:36

## 2021-11-13 RX ADMIN — CEPHALEXIN 500 MG: 500 CAPSULE ORAL at 11:12

## 2021-11-13 RX ADMIN — MICONAZOLE NITRATE: 20 POWDER TOPICAL at 19:49

## 2021-11-13 RX ADMIN — CEPHALEXIN 500 MG: 500 CAPSULE ORAL at 00:19

## 2021-11-13 RX ADMIN — ACETAMINOPHEN 1000 MG: 500 TABLET, FILM COATED ORAL at 13:28

## 2021-11-13 RX ADMIN — CEPHALEXIN 500 MG: 500 CAPSULE ORAL at 05:18

## 2021-11-13 RX ADMIN — CEPHALEXIN 500 MG: 500 CAPSULE ORAL at 18:19

## 2021-11-13 RX ADMIN — ENOXAPARIN SODIUM 40 MG: 40 INJECTION SUBCUTANEOUS at 18:19

## 2021-11-13 RX ADMIN — ACETAMINOPHEN 1000 MG: 500 TABLET, FILM COATED ORAL at 19:49

## 2021-11-13 RX ADMIN — MICONAZOLE NITRATE: 20 POWDER TOPICAL at 08:36

## 2021-11-13 RX ADMIN — ENOXAPARIN SODIUM 40 MG: 40 INJECTION SUBCUTANEOUS at 05:18

## 2021-11-13 ASSESSMENT — ACTIVITIES OF DAILY LIVING (ADL)
ADLS_ACUITY_SCORE: 25
ADLS_ACUITY_SCORE: 27
ADLS_ACUITY_SCORE: 27
ADLS_ACUITY_SCORE: 25
ADLS_ACUITY_SCORE: 27
ADLS_ACUITY_SCORE: 27
ADLS_ACUITY_SCORE: 25
ADLS_ACUITY_SCORE: 27
ADLS_ACUITY_SCORE: 27
ADLS_ACUITY_SCORE: 25
ADLS_ACUITY_SCORE: 27
ADLS_ACUITY_SCORE: 25
ADLS_ACUITY_SCORE: 25
ADLS_ACUITY_SCORE: 27
ADLS_ACUITY_SCORE: 25
ADLS_ACUITY_SCORE: 27
ADLS_ACUITY_SCORE: 27
ADLS_ACUITY_SCORE: 25
ADLS_ACUITY_SCORE: 27

## 2021-11-13 NOTE — PLAN OF CARE
"/71 (BP Location: Left arm)   Pulse 89   Temp (!) 96.7  F (35.9  C) (Temporal)   Resp 16   Ht 1.702 m (5' 7\")   Wt (!) 169.6 kg (374 lb)   SpO2 95%   BMI 58.58 kg/m      Pt A&Ox4 with forgetulness. VSS. BLE pain managed with scheduled tylenol. Bilateral lymphedema wraps on. Requires lift transfer. Pulsate mattress. Purewich in place. K/mag protocol. Loss of PIV & pt is a difficult stick. MD aware. Medically stable for discharge, awaiting TCU placement. Will cont POC.    Carolina Goldberg RN  "

## 2021-11-13 NOTE — PROGRESS NOTES
[facility]  Hospitalist Progress Note  Ana María Nieto MD, MD 11/13/2021    Reason for Stay (Diagnosis): Early sepsis versus SIRS secondary to underlying left lower extremity cellulitis         Assessment and Plan:      Summary of Stay: Cristy Bailey is a 76 year old female longstanding history of hypertension, morbid obesity, prior hip surgery, dyslipidemia, hypertension, reportedly lives in a mobile home setting and moves around utilizing a wheelchair and reports they can earlier from patient's neighbors as per EMS report to ER that patient has not left the house for approximately 3 years .  EMS personnel was activated after apparently she had a fall event after sliding off from her recliner.  Her significant other attempted to help her but was not able to do so that led to EMS activation.   In the ER, she was noted to be tachycardic.  She was noted to have evidence of cellulitis on right lower extremity  Lab work-up showed WBC 30.8, hemoglobin 13.8, , lactic acid 1.8, D-dimer 1.76.  COVID-19 PCR negative.  CRP elevated at 159.  She was started on IV antibiotics and admitted to the hospital for further management.     1.  SIRS versus early sepsis with lactic acidosis, significant leukocytosis, tachycardia 104 bpm with underlying right lower extremity cellulitis and also bilateral forearm cellulitis  -WBC 13.8 on admission.  WBC improved. CRP down from 159 to 73.9 today. She is currently afebrile.  Blood cultures negative so far.    -Treated with IV vancomycin and Zosyn. Significant improvement of her cellulitis. Afebrile. CBC normal.  Transitioned to po Keflex on 11/11. We will continue Keflex for 3 more days to complete 10-day course of antibiotic treatment.  -CT of the chest negative for PE but cannot rule out possible consolidation or atelectasis  -Discharge to TCU once placement available.     2.  Fall event with severe physical deconditioning with concomitant failure to  "thrive  -Continue therapy    3.  Morbid obesity: Complicating care. BMI 58    4.  History of hypertension: Vital signs stable    5.  History of dyslipidemia    6.  Routine COVID-19 screen negative    Code status: DNR/DNI as expressed by the patient    Prophylaxis: Lovenox, high risk for VTE on Lovenox twice daily.Can also apply PCD's  Disposition: discharge to TCU once placement available.          Interval History (Subjective):      Patient seen and examined today.  Patient states that she is feeling much better.  No fever.  No redness of extremities.  Denies any new complaints.  Awaiting placement              Physical Exam:      Last Vital Signs:  /71 (BP Location: Left arm)   Pulse 89   Temp (!) 96.7  F (35.9  C) (Temporal)   Resp 16   Ht 1.702 m (5' 7\")   Wt (!) 169.6 kg (374 lb)   SpO2 95%   BMI 58.58 kg/m      I/O last 3 completed shifts:  In: 240 [P.O.:240]  Out: -   Wt Readings from Last 1 Encounters:   11/09/21 (!) 169.6 kg (374 lb)     Vitals:    11/06/21 1543 11/08/21 0900 11/09/21 0900   Weight: (!) 163 kg (359 lb 5.6 oz) (!) 170.6 kg (376 lb) (!) 169.6 kg (374 lb)       Constitutional: Awake, alert, cooperative, no apparent distress   Respiratory:  Fair air entry, distant breath sounds, no crackles or wheezing   Cardiovascular: Regular rate and rhythm, normal S1 and S2, and no murmur noted   Abdomen: Normal bowel sounds, soft, non-distended, non-tender   Skin:  Multiple skin excoriations on upper and lower extremities, bruises and ecchymosis seem as well   Neuro: Alert and oriented x3, no weakness, spontaneous and coherent speech   Extremities:  Bilateral lymphedema lower extremities, extensive onychomycosis, very dry skin with several excoriations noted,     Decreasing edema on both lower extremities   normal range of motion    This is a photograph during early part of her hospitalization at the time of admission process         Other(s):  Euthymic mood now, not agitated       All other " systems: Negative          Medications:      All current medications were reviewed with changes reflected in problem list.         Data:      All new lab and imaging data was reviewed.   Labs:  No results for input(s): CULT in the last 168 hours.  Recent Labs   Lab 11/12/21  0719 11/11/21  0740 11/10/21  0728 11/09/21 0619 11/08/21  1925   WBC  --  7.5 7.3  --  9.4   HGB  --  13.0 13.1  --  12.7   HCT  --  40.8 42.3  --  39.2   MCV  --  92 94  --  92    299 311   < > 263    < > = values in this interval not displayed.     Recent Labs   Lab 11/13/21  0557 11/12/21  0719 11/12/21  0150 11/11/21  1504 11/11/21  0740 11/10/21  1706 11/10/21  0728 11/09/21  0619 11/08/21  1054 11/07/21  0552   NA  --   --   --   --  145*  --  146*  --  142 138   POTASSIUM  --  3.6 3.5 3.4 3.2*   < > 3.4   < > 4.3 4.0   CHLORIDE  --   --   --   --  109  --  111*  --  111* 107   CO2  --   --   --   --  29  --  28  --  25 25   ANIONGAP  --   --   --   --  7  --  7  --  6 6   GLC  --   --   --   --  103*  --  96  --  101* 108*   BUN  --   --   --   --  8  --  9  --  18 17   CR 0.66 0.69  --   --  0.77  --  0.70   < > 0.73 0.80   GFRESTIMATED 87 86  --   --  76  --  86   < > 81 73   ASHIA  --   --   --   --  7.8*  --  7.6*  --  7.9* 7.5*   MAG  --  2.0  --   --  2.1  --  2.1   < > 2.2 1.9   PROTTOTAL  --   --   --   --   --   --   --   --   --  6.2*   ALBUMIN  --   --   --   --   --   --   --   --   --  2.1*   BILITOTAL  --   --   --   --   --   --   --   --   --  0.7   ALKPHOS  --   --   --   --   --   --   --   --   --  91   AST  --   --   --   --   --   --   --   --   --  35   ALT  --   --   --   --   --   --   --   --   --  29    < > = values in this interval not displayed.     No results for input(s): CKT in the last 168 hours.    Invalid input(s): CK, CK TOTAL  No results for input(s): DD in the last 168 hours.  Recent [facility]   Lab 11/10/21  0728 11/08/21  1054   CRP 73.9* 159.0*     Right Relevance   Lab 11/11/21  0740 11/10/21  0728  11/08/21  1054 11/07/21  0552   * 96 101* 108*     No results for input(s): INR in the last 168 hours.  No results for input(s): LIPASE in the last 168 hours.  No results for input(s): TROPONIN, TROPI, TROPR in the last 168 hours.    Invalid input(s): TROP, TROPONINIES  Recent Labs   Lab 11/07/21  1148   COLOR Yellow   APPEARANCE Clear   URINEGLC Negative   URINEBILI Negative   URINEKETONE 10 *   SG 1.010   UBLD Negative   URINEPH 6.0   PROTEIN 30 *   NITRITE Negative   LEUKEST Negative   RBCU 4*   WBCU 2      Imaging:   Results for orders placed or performed during the hospital encounter of 11/06/21   CT Chest/Abdomen/Pelvis w Contrast    Narrative    EXAM: CT CHEST/ABDOMEN/PELVIS W CONTRAST  LOCATION: [facility]  DATE/TIME: 11/6/2021 4:30 PM    INDICATION: SOB, history of DVT, D-dimer 1.9, right hip redness, and decubitus, eval for pelvic infection.  COMPARISON: None.  TECHNIQUE: CT scan of the chest, abdomen, and pelvis was performed following injection of IV contrast. Multiplanar reformats were obtained. Dose reduction techniques were used.   CONTRAST: 100 mL Isovue-370    FINDINGS:   LUNGS AND PLEURA: Patchy opacity AP atelectasis at the left base, but developing pneumonia not excluded. No effusions or pneumothorax. Right middle lobe 0.4 cm nodule series 9 image 137. Adjacent calcified granuloma.    MEDIASTINUM/AXILLAE: No acute thoracic aortic abnormality. No pulmonary embolism can be seen. A few small thyroid nodules. No suspicious lymph node.    CORONARY ARTERY CALCIFICATION: Moderate.    HEPATOBILIARY: Cholelithiasis suggested. No acute liver abnormality.    PANCREAS: Normal.    SPLEEN: Normal.    ADRENAL GLANDS: Normal.    KIDNEYS/BLADDER: Normal.    BOWEL: A few mildly distended small bowel loops. No convincing acute bowel abnormality. Limited visualization of the bowel. Appendix cannot be seen. No secondary signs of appendicitis.    LYMPH NODES: Normal.    VASCULATURE:  Unremarkable.    PELVIC ORGANS: Obscured for assessment by streak artifact.    MUSCULOSKELETAL: Bilateral hip arthroplasties. No aggressive bone lesion. No clear subcutaneous fluid collections can be seen but assessment limited by body habitus and streak artifact at the pelvis and abdomen.      Impression    IMPRESSION:  1.  Mild consolidation versus atelectasis of the left lower lobe. Pneumonia at this position is possible.  2.  Cholelithiasis.  3.  Coronary artery calcifications.  4.  No acute abnormality otherwise seen with limitation as detailed above.

## 2021-11-13 NOTE — PLAN OF CARE
For VS and complete assessments, please see documentation in flowsheets.     Pertinent shift assessments: VSS. A&Ox4. Transfers assist of 2 with lift. C/Olower back pain and pain in legs. Bilateral lymph wraps in place. Patient on pulsating mattress.     Treatment Plan: Continue current POC     Expected Discharge Date/Disposition: awaiting TCU placement.    Sai Ballesteros RN.

## 2021-11-14 ENCOUNTER — HEALTH MAINTENANCE LETTER (OUTPATIENT)
Age: 76
End: 2021-11-14

## 2021-11-14 ENCOUNTER — APPOINTMENT (OUTPATIENT)
Dept: OCCUPATIONAL THERAPY | Facility: CLINIC | Age: 76
DRG: 871 | End: 2021-11-14
Payer: COMMERCIAL

## 2021-11-14 ENCOUNTER — APPOINTMENT (OUTPATIENT)
Dept: PHYSICAL THERAPY | Facility: CLINIC | Age: 76
DRG: 871 | End: 2021-11-14
Payer: COMMERCIAL

## 2021-11-14 LAB
ANION GAP SERPL CALCULATED.3IONS-SCNC: 4 MMOL/L (ref 3–14)
BUN SERPL-MCNC: 6 MG/DL (ref 7–30)
CALCIUM SERPL-MCNC: 7.9 MG/DL (ref 8.5–10.1)
CHLORIDE BLD-SCNC: 108 MMOL/L (ref 94–109)
CO2 SERPL-SCNC: 31 MMOL/L (ref 20–32)
CREAT SERPL-MCNC: 0.61 MG/DL (ref 0.52–1.04)
CRP SERPL-MCNC: 17.2 MG/L (ref 0–8)
ERYTHROCYTE [DISTWIDTH] IN BLOOD BY AUTOMATED COUNT: 14.7 % (ref 10–15)
GFR SERPL CREATININE-BSD FRML MDRD: 90 ML/MIN/1.73M2
GLUCOSE BLD-MCNC: 112 MG/DL (ref 70–99)
HCT VFR BLD AUTO: 41.9 % (ref 35–47)
HGB BLD-MCNC: 12.6 G/DL (ref 11.7–15.7)
MCH RBC QN AUTO: 28.9 PG (ref 26.5–33)
MCHC RBC AUTO-ENTMCNC: 30.1 G/DL (ref 31.5–36.5)
MCV RBC AUTO: 96 FL (ref 78–100)
PLATELET # BLD AUTO: 315 10E3/UL (ref 150–450)
POTASSIUM BLD-SCNC: 3.5 MMOL/L (ref 3.4–5.3)
RBC # BLD AUTO: 4.36 10E6/UL (ref 3.8–5.2)
SODIUM SERPL-SCNC: 143 MMOL/L (ref 133–144)
WBC # BLD AUTO: 8.5 10E3/UL (ref 4–11)

## 2021-11-14 PROCEDURE — 99233 SBSQ HOSP IP/OBS HIGH 50: CPT | Performed by: INTERNAL MEDICINE

## 2021-11-14 PROCEDURE — 97530 THERAPEUTIC ACTIVITIES: CPT | Mod: GP | Performed by: PHYSICAL THERAPIST

## 2021-11-14 PROCEDURE — 86140 C-REACTIVE PROTEIN: CPT | Performed by: INTERNAL MEDICINE

## 2021-11-14 PROCEDURE — 97140 MANUAL THERAPY 1/> REGIONS: CPT | Mod: GP | Performed by: PHYSICAL THERAPIST

## 2021-11-14 PROCEDURE — 36415 COLL VENOUS BLD VENIPUNCTURE: CPT | Performed by: INTERNAL MEDICINE

## 2021-11-14 PROCEDURE — 80048 BASIC METABOLIC PNL TOTAL CA: CPT | Performed by: INTERNAL MEDICINE

## 2021-11-14 PROCEDURE — 250N000013 HC RX MED GY IP 250 OP 250 PS 637: Performed by: CLINICAL NURSE SPECIALIST

## 2021-11-14 PROCEDURE — 97530 THERAPEUTIC ACTIVITIES: CPT | Mod: GO | Performed by: OCCUPATIONAL THERAPIST

## 2021-11-14 PROCEDURE — 85048 AUTOMATED LEUKOCYTE COUNT: CPT | Performed by: INTERNAL MEDICINE

## 2021-11-14 PROCEDURE — 250N000013 HC RX MED GY IP 250 OP 250 PS 637: Performed by: INTERNAL MEDICINE

## 2021-11-14 PROCEDURE — 120N000001 HC R&B MED SURG/OB

## 2021-11-14 PROCEDURE — 250N000011 HC RX IP 250 OP 636: Performed by: INTERNAL MEDICINE

## 2021-11-14 RX ADMIN — ENOXAPARIN SODIUM 40 MG: 40 INJECTION SUBCUTANEOUS at 06:04

## 2021-11-14 RX ADMIN — CEPHALEXIN 500 MG: 500 CAPSULE ORAL at 17:20

## 2021-11-14 RX ADMIN — CEPHALEXIN 500 MG: 500 CAPSULE ORAL at 06:04

## 2021-11-14 RX ADMIN — CEPHALEXIN 500 MG: 500 CAPSULE ORAL at 12:02

## 2021-11-14 RX ADMIN — ACETAMINOPHEN 1000 MG: 500 TABLET, FILM COATED ORAL at 13:37

## 2021-11-14 RX ADMIN — MICONAZOLE NITRATE: 20 POWDER TOPICAL at 08:55

## 2021-11-14 RX ADMIN — CEPHALEXIN 500 MG: 500 CAPSULE ORAL at 00:57

## 2021-11-14 RX ADMIN — ACETAMINOPHEN 1000 MG: 500 TABLET, FILM COATED ORAL at 19:44

## 2021-11-14 RX ADMIN — ENOXAPARIN SODIUM 40 MG: 40 INJECTION SUBCUTANEOUS at 17:20

## 2021-11-14 RX ADMIN — MICONAZOLE NITRATE: 20 POWDER TOPICAL at 19:45

## 2021-11-14 RX ADMIN — ACETAMINOPHEN 1000 MG: 500 TABLET, FILM COATED ORAL at 08:55

## 2021-11-14 ASSESSMENT — ACTIVITIES OF DAILY LIVING (ADL)
ADLS_ACUITY_SCORE: 27
ADLS_ACUITY_SCORE: 23
ADLS_ACUITY_SCORE: 27

## 2021-11-14 NOTE — PLAN OF CARE
"/60 (BP Location: Left arm)   Pulse 79   Temp 97  F (36.1  C) (Temporal)   Resp 18   Ht 1.702 m (5' 7\")   Wt (!) 169.6 kg (374 lb)   SpO2 94%   BMI 58.58 kg/m      Pt A&Ox4 with forgetulness. VSS. BLE pain managed with scheduled tylenol. Bilateral lymphedema wraps on. Requires lift transfer. Up to chair this shift. Pulsate mattress. Purewick in place. K/mag protocol. Loss of PIV & pt is a difficult stick. MD aware. Medically stable for discharge, awaiting TCU placement. Will cont POC.    Carolina Goldberg RN    "

## 2021-11-14 NOTE — PLAN OF CARE
For VS and complete assessments, please see documentation in flowsheets.     Pertinent shift assessments: VSS. A&Ox4. Transfers assit of 2 with lift. C/O bilateral lower leg pain. Denies CP/SOB/Dizziness/ Light-headedness. Bilateral lymph wraps in place.     Treatment Plan: Continue current POC     Expected Discharge Date/Disposition:  awaiting TCU placement.    Sai Ballesteros RN.

## 2021-11-14 NOTE — PROGRESS NOTES
Hospitalist Medicine Progress Note   [facility]       Cristy Bailey is a 76 year old lady with essential hypertension, morbid obesity, prior hip surgery, hypercholesteremia who lives in a mobile home setting and moves around in a wheelchair.  According to significant other she did not leave her house for the past 4 years and after sliding from her wheelchair and significant other unable to get her up back to the wheelchair was brought in to [facility] on 11/6/2021.  She was diagnosed with tachycardia, left lower lobe pneumonia, cellulitis of right lower extremity, and 0.4 cm and nodule in the right middle lobe of the lung, cholelithiasis.  She was started on IV vancomycin and Zosyn at admission 11/6/2021 and with no growth in the blood and improvement of cellulitis this was changed on 11/11/2021 to oral cephalexin.        Date of Admission:  11/6/2021  Assessment & Plan     Probable early sepsis with lower extremity cellulitis  ?  Left lower lobe pneumonia  We will check CRP, CBC and BMP  Antibiotics as mentioned above  CRP is appreciably come down  Awaiting TCU once placement is available    Fall  Failure to thrive  With severe deconditioning  Will need PT and OT treatment in the TCU    Morbid obesity  BMI of 58    ? Essential hypertension  Per history patient is not on any antihypertensive medication    Hypercholesterolemia  Not on treatment at this time    Patient is DNR/DNI              Plan:   Monitor the left leg erythema  Check CMP in am - monitor albumin with edema  Discharge in 1-2 days       Diet: Combination Diet Regular Diet Adult  Diet    DVT Prophylaxis: Enoxaparin (Lovenox) SQ  [place] Catheter: Not present  Code Status: No CPR- Do NOT Intubate               The patient's care was discussed with the Patient and her     Campbell Benitez MD  Hospitalist Service  [facility]  Hospital    ______________________________________________________________________    Interval History     Symptoms   Left lower extremity has redness    Review of Systems:   He is worried about the lower extremity swelling    Data reviewed today: I reviewed all medications, new labs and imaging results over the last 24 hours.     Physical Exam   Vital Signs: Temp: 97  F (36.1  C) Temp src: Temporal BP: 133/60 Pulse: 79   Resp: 18 SpO2: 94 % O2 Device: None (Room air)    Weight: 374 lbs 0 oz      GENERAL: Patient is not in acute distress  HEENT: EOM+,Conjunctiva is clear   NECK:  no Jugular Venous distention  HEART: S1 S2 regular Rate and Rhythm, there is  no murmur,   LUNGS: Respirations are  not laboured, Lungs are  clear to auscultation without Crepitations or Wheezing   ABDOMEN: Soft , there is no tenderness ,Bowel Sounds are Positive   LOWER LIMBS: Lower two thirds of the leg is erythematous on the left side pedal Edema Bilaterally   CNS:  Alert,  Oriented x 3, Moving all the Four Limbs     Data   Recent Labs   Lab 11/13/21  0557 11/12/21  0719 11/12/21  0150 11/11/21  1504 11/11/21  0740 11/10/21  1706 11/10/21  0728 11/09/21  0619 11/08/21  1925 11/08/21  1054   WBC  --   --   --   --  7.5  --  7.3  --  9.4  --    HGB  --   --   --   --  13.0  --  13.1  --  12.7  --    MCV  --   --   --   --  92  --  94  --  92  --    PLT  --  320  --   --  299  --  311   < > 263  --    NA  --   --   --   --  145*  --  146*  --   --  142   POTASSIUM  --  3.6 3.5 3.4 3.2*   < > 3.4   < >  --  4.3   CHLORIDE  --   --   --   --  109  --  111*  --   --  111*   CO2  --   --   --   --  29  --  28  --   --  25   BUN  --   --   --   --  8  --  9  --   --  18   CR 0.66 0.69  --   --  0.77  --  0.70   < >  --  0.73   ANIONGAP  --   --   --   --  7  --  7  --   --  6   ASHIA  --   --   --   --  7.8*  --  7.6*  --   --  7.9*   GLC  --   --   --   --  103*  --  96  --   --  101*    < > = values in this interval not displayed.         No  results found for this or any previous visit (from the past 24 hour(s)).

## 2021-11-15 ENCOUNTER — APPOINTMENT (OUTPATIENT)
Dept: ULTRASOUND IMAGING | Facility: CLINIC | Age: 76
DRG: 871 | End: 2021-11-15
Attending: INTERNAL MEDICINE
Payer: COMMERCIAL

## 2021-11-15 ENCOUNTER — APPOINTMENT (OUTPATIENT)
Dept: PHYSICAL THERAPY | Facility: CLINIC | Age: 76
DRG: 871 | End: 2021-11-15
Payer: COMMERCIAL

## 2021-11-15 LAB
ALBUMIN SERPL-MCNC: 2.2 G/DL (ref 3.4–5)
ALP SERPL-CCNC: 83 U/L (ref 40–150)
ALT SERPL W P-5'-P-CCNC: 39 U/L (ref 0–50)
ANION GAP SERPL CALCULATED.3IONS-SCNC: 4 MMOL/L (ref 3–14)
AST SERPL W P-5'-P-CCNC: 28 U/L (ref 0–45)
BILIRUB SERPL-MCNC: 0.4 MG/DL (ref 0.2–1.3)
BUN SERPL-MCNC: 7 MG/DL (ref 7–30)
CALCIUM SERPL-MCNC: 8.2 MG/DL (ref 8.5–10.1)
CHLORIDE BLD-SCNC: 109 MMOL/L (ref 94–109)
CO2 SERPL-SCNC: 31 MMOL/L (ref 20–32)
CREAT SERPL-MCNC: 0.69 MG/DL (ref 0.52–1.04)
ERYTHROCYTE [DISTWIDTH] IN BLOOD BY AUTOMATED COUNT: 14.9 % (ref 10–15)
GFR SERPL CREATININE-BSD FRML MDRD: 86 ML/MIN/1.73M2
GLUCOSE BLD-MCNC: 97 MG/DL (ref 70–99)
HCT VFR BLD AUTO: 41.4 % (ref 35–47)
HGB BLD-MCNC: 12.6 G/DL (ref 11.7–15.7)
MAGNESIUM SERPL-MCNC: 2.1 MG/DL (ref 1.6–2.3)
MCH RBC QN AUTO: 29.1 PG (ref 26.5–33)
MCHC RBC AUTO-ENTMCNC: 30.4 G/DL (ref 31.5–36.5)
MCV RBC AUTO: 96 FL (ref 78–100)
PLATELET # BLD AUTO: 315 10E3/UL (ref 150–450)
POTASSIUM BLD-SCNC: 3.8 MMOL/L (ref 3.4–5.3)
PROT SERPL-MCNC: 6 G/DL (ref 6.8–8.8)
RBC # BLD AUTO: 4.33 10E6/UL (ref 3.8–5.2)
SODIUM SERPL-SCNC: 144 MMOL/L (ref 133–144)
WBC # BLD AUTO: 7.3 10E3/UL (ref 4–11)

## 2021-11-15 PROCEDURE — 250N000013 HC RX MED GY IP 250 OP 250 PS 637: Performed by: CLINICAL NURSE SPECIALIST

## 2021-11-15 PROCEDURE — 250N000011 HC RX IP 250 OP 636: Performed by: INTERNAL MEDICINE

## 2021-11-15 PROCEDURE — 80053 COMPREHEN METABOLIC PANEL: CPT | Performed by: INTERNAL MEDICINE

## 2021-11-15 PROCEDURE — 97140 MANUAL THERAPY 1/> REGIONS: CPT | Mod: GP | Performed by: PHYSICAL THERAPIST

## 2021-11-15 PROCEDURE — 120N000001 HC R&B MED SURG/OB

## 2021-11-15 PROCEDURE — 85018 HEMOGLOBIN: CPT | Performed by: INTERNAL MEDICINE

## 2021-11-15 PROCEDURE — 99232 SBSQ HOSP IP/OBS MODERATE 35: CPT | Performed by: INTERNAL MEDICINE

## 2021-11-15 PROCEDURE — 97530 THERAPEUTIC ACTIVITIES: CPT | Mod: GP | Performed by: PHYSICAL THERAPIST

## 2021-11-15 PROCEDURE — 250N000013 HC RX MED GY IP 250 OP 250 PS 637: Performed by: INTERNAL MEDICINE

## 2021-11-15 PROCEDURE — 83735 ASSAY OF MAGNESIUM: CPT | Performed by: INTERNAL MEDICINE

## 2021-11-15 PROCEDURE — 93971 EXTREMITY STUDY: CPT | Mod: RT

## 2021-11-15 PROCEDURE — 36415 COLL VENOUS BLD VENIPUNCTURE: CPT | Performed by: INTERNAL MEDICINE

## 2021-11-15 RX ORDER — MULTIVITAMIN,THERAPEUTIC
1 TABLET ORAL DAILY
Status: DISCONTINUED | OUTPATIENT
Start: 2021-11-16 | End: 2021-01-01 | Stop reason: HOSPADM

## 2021-11-15 RX ADMIN — CAMPHOR AND MENTHOL: 5; 5 LOTION TOPICAL at 18:14

## 2021-11-15 RX ADMIN — ENOXAPARIN SODIUM 40 MG: 40 INJECTION SUBCUTANEOUS at 05:26

## 2021-11-15 RX ADMIN — MICONAZOLE NITRATE: 20 POWDER TOPICAL at 09:17

## 2021-11-15 RX ADMIN — OXYCODONE HYDROCHLORIDE 5 MG: 5 TABLET ORAL at 18:28

## 2021-11-15 RX ADMIN — ACETAMINOPHEN 1000 MG: 500 TABLET, FILM COATED ORAL at 14:10

## 2021-11-15 RX ADMIN — CEPHALEXIN 500 MG: 500 CAPSULE ORAL at 18:12

## 2021-11-15 RX ADMIN — ACETAMINOPHEN 1000 MG: 500 TABLET, FILM COATED ORAL at 09:17

## 2021-11-15 RX ADMIN — CEPHALEXIN 500 MG: 500 CAPSULE ORAL at 05:26

## 2021-11-15 RX ADMIN — MICONAZOLE NITRATE: 20 POWDER TOPICAL at 19:42

## 2021-11-15 RX ADMIN — CEPHALEXIN 500 MG: 500 CAPSULE ORAL at 12:52

## 2021-11-15 RX ADMIN — CEPHALEXIN 500 MG: 500 CAPSULE ORAL at 00:07

## 2021-11-15 RX ADMIN — ACETAMINOPHEN 1000 MG: 500 TABLET, FILM COATED ORAL at 19:41

## 2021-11-15 RX ADMIN — ENOXAPARIN SODIUM 40 MG: 40 INJECTION SUBCUTANEOUS at 18:13

## 2021-11-15 RX ADMIN — CEPHALEXIN 500 MG: 500 CAPSULE ORAL at 23:54

## 2021-11-15 ASSESSMENT — ACTIVITIES OF DAILY LIVING (ADL)
ADLS_ACUITY_SCORE: 31
ADLS_ACUITY_SCORE: 23
ADLS_ACUITY_SCORE: 31
ADLS_ACUITY_SCORE: 27
ADLS_ACUITY_SCORE: 27
ADLS_ACUITY_SCORE: 31
ADLS_ACUITY_SCORE: 27
ADLS_ACUITY_SCORE: 31
ADLS_ACUITY_SCORE: 23
ADLS_ACUITY_SCORE: 31
ADLS_ACUITY_SCORE: 27

## 2021-11-15 NOTE — PLAN OF CARE
Pt A&O x4-forgetful @ times. Very anxious and becomes tearful @ times. VS stable; afebrile. PO tylenol and oxycodone managing pain. CMS: numbness and tingling to BLEs-lymph wraps on. Up w/ Ax2, using mechanical lift. Voiding in good amts-using purewick. Had BM. Tolerating regular diet. Plan is TCU @ discharge. Will continue to monitor.     Attempted to get bedside ultrasound done of pt's lower extremities, pt was unable to tolerate it d/t her pain. Ultrasound said we could try again in the AM after pt receives pain medication. MD updated on this, awaiting order for LLE ultrasound.

## 2021-11-15 NOTE — PLAN OF CARE
Sleeping comfortable between cares. VSS: Afebrile. Did get OOB overnight, repositioning with A2 and lift. Scheduled Tylenol is managing pain. Redness on abdominal and breast fold, Miconazole powder and interDry applied. Lymphedema wraps on BLE. Voiding per Senthil. Awaiting placement.

## 2021-11-15 NOTE — PROVIDER NOTIFICATION
Cheryl page: Ultrasound will be able to try and get the L leg done tomorrow morning after pain medication, but we will need the order to be put in again. thanks

## 2021-11-15 NOTE — PROGRESS NOTES
CLINICAL NUTRITION SERVICES - REASSESSMENT NOTE      MALNUTRITION:  % Weight Loss: None noted during admit but with lymphedema shifts  % Intake:  No decreased intake noted   Subcutaneous Fat Loss:  Upper arm region moderate depletion --> not using as indicator with only 1 region present   Muscle Loss:  Temporal region moderate depletion, Clavicle bone region mild to moderate depletion and Acromion bone region mild to moderate depletion  Fluid Retention: Moderate to severe, generalized --> not using as indicator/weak indicator with lymphedema hx     Malnutrition Diagnosis: Patient does not meet 2 malnutrition criteria       EVALUATION OF PROGRESS TOWARD GOALS   Diet: Regular    Intake/Tolerance:  Mostly % intakes since last RD assessment and consistently ordering meals.      - WOCN consulted and found to have BL buttocks wounds d/t PI, friction, and MASD.      ASSESSED NUTRITION NEEDS PER APPROVED PRACTICE GUIDELINES:     Dosing Weight 163 kg - dry wt?  Estimated Energy Needs: 15-20+ Kcal/Kg  Justification: maintenance, degree of wound healing needs  Estimated Protein Needs: 1-1.2+ g pro/Kg  Justification: preservation of lean body mass, degree of wound healing needs  Estimated Fluid Needs: per MD      NEW FINDINGS:   - Medications reviewed including:     acetaminophen  1,000 mg Oral TID     cephALEXin  500 mg Oral Q6H DARWIN     enoxaparin ANTICOAGULANT  40 mg Subcutaneous Q12H     miconazole   Topical BID     sodium chloride (PF)  10 mL Intracatheter Q8H     sodium chloride (PF)  3 mL Intracatheter Q8H         - Labs reviewed including:  Electrolytes  Potassium (mmol/L)   Date Value   11/15/2021 3.8   11/14/2021 3.5   11/12/2021 3.6   02/19/2019 3.9   01/28/2019 4.2   08/01/2018 3.8    Blood Glucose  Glucose (mg/dL)   Date Value   11/15/2021 97   11/14/2021 112 (H)   11/11/2021 103 (H)   11/10/2021 96   11/08/2021 101 (H)   02/19/2019 95   02/14/2019 116 (H)   01/28/2019 99   08/30/2018 107 (H)   08/01/2018 111  (H)     Hemoglobin A1C (%)   Date Value   03/24/2015 5.9   05/03/2012 5.8   01/19/2010 5.6    Inflammatory Markers  CRP Inflammation (mg/L)   Date Value   11/14/2021 17.2 (H)   11/10/2021 73.9 (H)   11/08/2021 159.0 (H)   08/31/2018 20.6 (H)   08/25/2017 9.4 (H)   05/03/2012 17.1 (H)     WBC (10e9/L)   Date Value   08/30/2018 8.9   04/30/2018 12.4 (H)   02/08/2018 6.9     WBC Count (10e3/uL)   Date Value   11/15/2021 7.3   11/14/2021 8.5   11/11/2021 7.5     Albumin (g/dL)   Date Value   11/15/2021 2.2 (L)   11/07/2021 2.1 (L)   11/06/2021 2.1 (L)   04/30/2018 3.8   03/25/2014 3.8   03/02/2013 3.9      Magnesium (mg/dL)   Date Value   11/15/2021 2.1   11/12/2021 2.0   11/11/2021 2.1     Sodium (mmol/L)   Date Value   11/15/2021 144   11/14/2021 143   11/11/2021 145 (H)   02/19/2019 141   01/28/2019 140   08/01/2018 139    Renal  Urea Nitrogen (mg/dL)   Date Value   11/15/2021 7   11/14/2021 6 (L)   11/11/2021 8   02/19/2019 15   01/28/2019 20   08/01/2018 24     Creatinine (mg/dL)   Date Value   11/15/2021 0.69   11/14/2021 0.61   11/13/2021 0.66   02/19/2019 0.63   02/12/2019 0.71   01/28/2019 0.67     Additional  Triglycerides (mg/dL)   Date Value   03/25/2014 64   03/02/2013 74   05/03/2012 79     Ketones Urine (mg/dL)   Date Value   11/07/2021 10  (A)   08/25/2017 Negative        -  Weight trending reviewed and masked by lymphedema:  Vitals:    11/06/21 1543 11/08/21 0900 11/09/21 0900   Weight: (!) 163 kg (359 lb 5.6 oz) (!) 170.6 kg (376 lb) (!) 169.6 kg (374 lb)     - Stooling patterns reviewed.      Previous Goals:   Patient to consume at least 75% of meals TID while admitted.  Evaluation: Met    Previous Nutrition Diagnosis:   Predicted inadequate nutrient intake (energy/protein) related to potential for decline in PO intakes during admit pending LOS and assessment by WOCN (?protein needs if wounds).  Evaluation: Completed      CURRENT NUTRITION DIAGNOSIS  No nutrition diagnosis at this  time.    INTERVENTIONS  Recommendations / Nutrition Prescription  Diet per MD.    Add daily MVI/M from wound healing perspective though appears to be suspected PI with combination friction and MASD.    Implementation  Multivitamin/Mineral: As above.    Goals  Patient to consume at least 75% of meals TID while admitted.      MONITORING AND EVALUATION:  Progress towards goals will be monitored and evaluated per protocol and Practice Guidelines      Loretta Degroot RDN, LD  Clinical Dietitian  3rd floor/ICU: [phone]  All other floors: [phone]  Weekend/holiday: [phone]

## 2021-11-16 ENCOUNTER — APPOINTMENT (OUTPATIENT)
Dept: ULTRASOUND IMAGING | Facility: CLINIC | Age: 76
DRG: 871 | End: 2021-11-16
Attending: INTERNAL MEDICINE
Payer: COMMERCIAL

## 2021-11-16 ENCOUNTER — APPOINTMENT (OUTPATIENT)
Dept: PHYSICAL THERAPY | Facility: CLINIC | Age: 76
DRG: 871 | End: 2021-11-16
Payer: COMMERCIAL

## 2021-11-16 ENCOUNTER — APPOINTMENT (OUTPATIENT)
Dept: OCCUPATIONAL THERAPY | Facility: CLINIC | Age: 76
DRG: 871 | End: 2021-11-16
Payer: COMMERCIAL

## 2021-11-16 LAB
MAGNESIUM SERPL-MCNC: 2.1 MG/DL (ref 1.6–2.3)
POTASSIUM BLD-SCNC: 3.5 MMOL/L (ref 3.4–5.3)

## 2021-11-16 PROCEDURE — 250N000013 HC RX MED GY IP 250 OP 250 PS 637: Performed by: INTERNAL MEDICINE

## 2021-11-16 PROCEDURE — G0463 HOSPITAL OUTPT CLINIC VISIT: HCPCS

## 2021-11-16 PROCEDURE — 93971 EXTREMITY STUDY: CPT | Mod: LT

## 2021-11-16 PROCEDURE — 99232 SBSQ HOSP IP/OBS MODERATE 35: CPT | Performed by: INTERNAL MEDICINE

## 2021-11-16 PROCEDURE — 83735 ASSAY OF MAGNESIUM: CPT | Performed by: INTERNAL MEDICINE

## 2021-11-16 PROCEDURE — 97530 THERAPEUTIC ACTIVITIES: CPT | Mod: GP | Performed by: PHYSICAL THERAPIST

## 2021-11-16 PROCEDURE — 120N000001 HC R&B MED SURG/OB

## 2021-11-16 PROCEDURE — 250N000011 HC RX IP 250 OP 636: Performed by: INTERNAL MEDICINE

## 2021-11-16 PROCEDURE — 97530 THERAPEUTIC ACTIVITIES: CPT | Mod: GO | Performed by: REHABILITATION PRACTITIONER

## 2021-11-16 PROCEDURE — 36415 COLL VENOUS BLD VENIPUNCTURE: CPT | Performed by: INTERNAL MEDICINE

## 2021-11-16 PROCEDURE — 84132 ASSAY OF SERUM POTASSIUM: CPT | Performed by: INTERNAL MEDICINE

## 2021-11-16 PROCEDURE — 250N000013 HC RX MED GY IP 250 OP 250 PS 637: Performed by: CLINICAL NURSE SPECIALIST

## 2021-11-16 RX ORDER — OXYCODONE HYDROCHLORIDE 5 MG/1
10 TABLET ORAL ONCE
Status: COMPLETED | OUTPATIENT
Start: 2021-11-16 | End: 2021-11-16

## 2021-11-16 RX ADMIN — ACETAMINOPHEN 1000 MG: 500 TABLET, FILM COATED ORAL at 07:58

## 2021-11-16 RX ADMIN — CAMPHOR AND MENTHOL: 5; 5 LOTION TOPICAL at 21:51

## 2021-11-16 RX ADMIN — ENOXAPARIN SODIUM 40 MG: 40 INJECTION SUBCUTANEOUS at 17:43

## 2021-11-16 RX ADMIN — CEPHALEXIN 500 MG: 500 CAPSULE ORAL at 05:06

## 2021-11-16 RX ADMIN — LORAZEPAM 0.5 MG: 0.5 TABLET ORAL at 21:46

## 2021-11-16 RX ADMIN — ACETAMINOPHEN 1000 MG: 500 TABLET, FILM COATED ORAL at 13:41

## 2021-11-16 RX ADMIN — MICONAZOLE NITRATE: 20 POWDER TOPICAL at 21:39

## 2021-11-16 RX ADMIN — OXYCODONE HYDROCHLORIDE 10 MG: 5 TABLET ORAL at 11:45

## 2021-11-16 RX ADMIN — CEPHALEXIN 500 MG: 500 CAPSULE ORAL at 17:43

## 2021-11-16 RX ADMIN — ACETAMINOPHEN 1000 MG: 500 TABLET, FILM COATED ORAL at 21:27

## 2021-11-16 RX ADMIN — CEPHALEXIN 500 MG: 500 CAPSULE ORAL at 11:45

## 2021-11-16 RX ADMIN — MICONAZOLE NITRATE: 20 POWDER TOPICAL at 07:58

## 2021-11-16 RX ADMIN — ENOXAPARIN SODIUM 40 MG: 40 INJECTION SUBCUTANEOUS at 05:06

## 2021-11-16 RX ADMIN — THERA TABS 1 TABLET: TAB at 07:58

## 2021-11-16 ASSESSMENT — ACTIVITIES OF DAILY LIVING (ADL)
ADLS_ACUITY_SCORE: 31

## 2021-11-16 NOTE — PROGRESS NOTES
[facility] Nurse Inpatient Wound Assessment   Reason for consultation: Evaluate and treat  Bilateral buttock wounds    Assessment  Bilateral buttock wounds due to Pressure Injury, Friction and Moisture Associated Skin Damage (MASD)  Status: improving  Scattered partial thickness wounds from buttocks to posterior thighs, significantly improved    Treatment Plan  Bilateral buttocks/perianal/posterior thigh wounds: BID   1. Cleanse with Azra spray and soft dry wipe  2. Apply a thin layer of Azra Antifungal cream to buttocks, coccyx, perianal area and posterior thighs   Orders Reviewed  Recommended provider order: None, at this time  WO Nurse follow-up plan:weekly  Nursing to notify the Provider(s) and re-consult the WOC Nurse if wound(s) deteriorates or new skin concern.    Patient History  According to provider note(s):  Cristy Bailey is a 76 year old female longstanding history of hypertension, morbid obesity, prior hip surgery, dyslipidemia, hypertension, reportedly lives in a mobile home setting and moves around utilizing a wheelchair and reports they can earlier from patient's neighbors as per EMS report to ER that patient has not left the house of approximately 3 years already.  EMS personnel was activated earlier after apparently she had a fall event after sliding off from her recliner.  Her significant other attempted to help her but was not able to do so that led to EMS activation.    Objective Data  Containment of urine/stool: Incontinence Protocol    Active Diet Order  Orders Placed This Encounter      Combination Diet Regular Diet Adult      Diet      Output:   I/O last 3 completed shifts:  In: 480 [P.O.:480]  Out: 700 [Urine:700]    Risk Assessment:   Sensory Perception: 3-->slightly limited  Moisture: 3-->occasionally moist  Activity: 2-->chairfast  Mobility: 2-->very limited  Nutrition: 3-->adequate  Friction and Shear: 1-->problem  Demetrio Score: 14                          Labs:   Recent Labs   Lab  11/15/21  0801 11/14/21  0933   ALBUMIN 2.2*  --    HGB 12.6 12.6   WBC 7.3 8.5   CRP  --  17.2*       Physical Exam  Areas of skin assessed: focused Buttocks, perianal area    Wound Location:  Buttocks, perianal area  Date of last photo NA  Wound History: Patient reports spending much of her time in bed or wheelchair  Wound Base: 100 % agranular     Palpation of the wound bed: normal      Drainage: moderate     Description of drainage: serous     Measurements (length x width x depth, in cm) 4 scattered agranular areas ranging from 0.5x0.5cm to 3x2cm.  Surrounding this is decreased erythema     Tunneling N/A     Undermining N/A  Periwound skin: erythema- blanchable      Color: pink      Temperature: normal   Odor: none  Pain: mild    Interventions  Visual inspection and assessment completed   Wound Care Rationale Protect periwound skin and Provide protection   Wound Care: completed by RN  Supplies: at bedside and discussed with RN  Current off-loading measures: Pillows under calves and Pillows  Current support surface: Bariatric Low air loss mattress with pulsation   Education provided to: importance of repositioning, plan of care and Off-loading pressure  Discussed plan of care with Patient and Nurse    Everardo Pemberton RN CWOCN

## 2021-11-16 NOTE — PLAN OF CARE
Patient vital signs are at baseline: Yes on room air; HTN  Patient able to ambulate as they were prior to admission or with assist devices provided by therapies during their stay:  No,  Reason:  PT up with lift to bedside commode only  Patient MUST void prior to discharge:  Yes; purewick in place  Patient able to tolerate oral intake:  Yes on regular diet  Pain has adequate pain control using Oral analgesics:  Yes with scheduled tylenol.    Pt Ox4 but forgetful. Anxious and tearful at times. +3 edema to bilateral lower extremities. Lymph wraps in place.Oral antibiotics. Plan to discharge to TCU; pending placement. Will continue to monitor.

## 2021-11-16 NOTE — PROGRESS NOTES
Hospitalist Medicine Progress Note   [facility]       Cristy Bailey is a 76 year old lady with essential hypertension, morbid obesity, prior hip surgery, hypercholesteremia who lives in a mobile home setting and moves around in a wheelchair.  According to significant other she did not leave her house for the past 4 years and after sliding from her wheelchair and significant other unable to get her up back to the wheelchair was brought in to [facility] on 11/6/2021.  She was diagnosed with tachycardia, left lower lobe pneumonia, cellulitis of right lower extremity, and 0.4 cm and nodule in the right middle lobe of the lung, cholelithiasis.  She was started on IV vancomycin and Zosyn at admission 11/6/2021 and with no growth in the blood and improvement of cellulitis this was changed on 11/11/2021 to oral cephalexin.        Date of Admission:  11/6/2021  Assessment & Plan     Probable early sepsis with lower extremity cellulitis  ?  Left lower lobe pneumonia  Antibiotics as mentioned above  CRP is appreciably coming down  Awaiting TCU  Placement      Fall  Failure to thrive  With severe deconditioning  Will need PT and OT treatment in the TCU    Lower extremity swelling  No DVT  Has lower Albumin so probably third spacing     Morbid obesity  BMI of 58    ? Essential hypertension  Per history patient is not on any antihypertensive medication    Hypercholesterolemia  Not on treatment at this time    Patient is DNR/DNI              Plan:   CBC in am with CRP   Discharge in 1-2 days       Diet: Combination Diet Regular Diet Adult  Diet    DVT Prophylaxis: Enoxaparin (Lovenox) SQ  Carrasquillo Catheter: Not present  Code Status: No CPR- Do NOT Intubate               The patient's care was discussed with the Patient and her     Campbell Benitez MD  Hospitalist Service  [facility]    ______________________________________________________________________    Interval  History     Symptoms   Left lower extremity redness still presant  But less   Pain is 5/10 in the lower limbs     Review of Systems:   He is worried about the lower extremity swelling    Data reviewed today: I reviewed all medications, new labs and imaging results over the last 24 hours.     Physical Exam   Vital Signs: Temp: 97.1  F (36.2  C) Temp src: Temporal BP: (!) 140/61 Pulse: 71   Resp: 18 SpO2: 93 % O2 Device: None (Room air)    Weight: 374 lbs 0 oz      GENERAL: Patient is not in acute distress  HEENT: EOM+,Conjunctiva is clear   NECK:  no Jugular Venous distention  HEART: S1 S2 regular Rate and Rhythm, there is  no murmur,   LUNGS: Respirations are  not laboured, Lungs are  clear to auscultation without Crepitations or Wheezing   ABDOMEN: Soft , there is no tenderness ,Bowel Sounds are Positive   LOWER LIMBS: Lower two thirds of the leg is erythematous on the left side pedal Edema Bilaterally   CNS:  Alert,  Oriented x 3, Moving all the Four Limbs     Data   Recent Labs   Lab 11/16/21  0600 11/15/21  0801 11/14/21  0933 11/13/21  0557 11/12/21  0719 11/11/21  1504 11/11/21  0740   WBC  --  7.3 8.5  --   --   --  7.5   HGB  --  12.6 12.6  --   --   --  13.0   MCV  --  96 96  --   --   --  92   PLT  --  315 315  --  320  --  299   NA  --  144 143  --   --   --  145*   POTASSIUM 3.5 3.8 3.5  --  3.6   < > 3.2*   CHLORIDE  --  109 108  --   --   --  109   CO2  --  31 31  --   --   --  29   BUN  --  7 6*  --   --   --  8   CR  --  0.69 0.61 0.66 0.69  --  0.77   ANIONGAP  --  4 4  --   --   --  7   ASHIA  --  8.2* 7.9*  --   --   --  7.8*   GLC  --  97 112*  --   --   --  103*   ALBUMIN  --  2.2*  --   --   --   --   --    PROTTOTAL  --  6.0*  --   --   --   --   --    BILITOTAL  --  0.4  --   --   --   --   --    ALKPHOS  --  83  --   --   --   --   --    ALT  --  39  --   --   --   --   --    AST  --  28  --   --   --   --   --     < > = values in this interval not displayed.         Recent Results (from the  past 24 hour(s))   US Lower Extremity Venous Duplex Left Port    Narrative    ULTRASOUND LEFT LOWER EXTREMITY VENOUS DUPLEX PORTABLE November 16, 2021 3:06 PM    CLINICAL HISTORY: Swelling.    TECHNIQUE: Venous Duplex ultrasound of the left lower extremity with  and without compression, augmentation and duplex. Color flow and  spectral Doppler with waveform analysis performed.    COMPARISON: 11/15/2021.    FINDINGS: Exam includes the common femoral, femoral, popliteal, and  contralateral common femoral veins as well as segmentally visualized  deep calf veins and greater saphenous vein.     LEFT: No deep vein thrombosis. No superficial thrombophlebitis. No  popliteal cyst.      Impression    IMPRESSION: No deep venous thrombosis in the left lower extremity.    PERCY REDD MD         SYSTEM ID:  [id number]

## 2021-11-16 NOTE — PROVIDER NOTIFICATION
Cheryl page: Do you want to put in a COVID test for 646, she hasnt been tested since the 6th, Also did you see pharmacy's note about Keflex, did you want to D/C that? thanks

## 2021-11-16 NOTE — PROGRESS NOTES
Hospitalist Medicine Progress Note   [facility]       Cristy Bailey is a 76 year old lady with essential hypertension, morbid obesity, prior hip surgery, hypercholesteremia who lives in a mobile home setting and moves around in a wheelchair.  According to significant other she did not leave her house for the past 4 years and after sliding from her wheelchair and significant other unable to get her up back to the wheelchair was brought in to [facility] on 11/6/2021.  She was diagnosed with tachycardia, left lower lobe pneumonia, cellulitis of right lower extremity, and 0.4 cm and nodule in the right middle lobe of the lung, cholelithiasis.  She was started on IV vancomycin and Zosyn at admission 11/6/2021 and with no growth in the blood and improvement of cellulitis this was changed on 11/11/2021 to oral cephalexin.        Date of Admission:  11/6/2021  Assessment & Plan     Probable early sepsis with lower extremity cellulitis  ?  Left lower lobe pneumonia  We will check CRP, CBC and BMP  Antibiotics as mentioned above  CRP is appreciably coming down  Awaiting TCU  Placement upon availablity    Fall  Failure to thrive  With severe deconditioning  Will need PT and OT treatment in the TCU    Lower extremity swelling  Ordered ultrasound which the patient did not tolerate but would be done again in the morning    Morbid obesity  BMI of 58    ? Essential hypertension  Per history patient is not on any antihypertensive medication    Hypercholesterolemia  Not on treatment at this time    Patient is DNR/DNI              Plan:   Monitor the left leg erythema  US lower Limbs in am   Discharge in 1-2 days       Diet: Combination Diet Regular Diet Adult  Diet    DVT Prophylaxis: Enoxaparin (Lovenox) SQ  Carrasquillo Catheter: Not present  Code Status: No CPR- Do NOT Intubate               The patient's care was discussed with the Patient and her     Campbell Benitez MD  Hospitalist  [facility]    ______________________________________________________________________    Interval History     Symptoms   Left lower extremity redness still presant     Review of Systems:   He is worried about the lower extremity swelling    Data reviewed today: I reviewed all medications, new labs and imaging results over the last 24 hours.     Physical Exam   Vital Signs: Temp: 97.6  F (36.4  C) Temp src: Temporal BP: (!) 144/76 Pulse: 85   Resp: 18 SpO2: 99 % O2 Device: None (Room air)    Weight: 374 lbs 0 oz      GENERAL: Patient is not in acute distress  HEENT: EOM+,Conjunctiva is clear   NECK:  no Jugular Venous distention  HEART: S1 S2 regular Rate and Rhythm, there is  no murmur,   LUNGS: Respirations are  not laboured, Lungs are  clear to auscultation without Crepitations or Wheezing   ABDOMEN: Soft , there is no tenderness ,Bowel Sounds are Positive   LOWER LIMBS: Lower two thirds of the leg is erythematous on the left side pedal Edema Bilaterally   CNS:  Alert,  Oriented x 3, Moving all the Four Limbs     Data   Recent Labs   Lab 11/15/21  0801 11/14/21  0933 11/13/21  0557 11/12/21  0719 11/11/21  1504 11/11/21  0740   WBC 7.3 8.5  --   --   --  7.5   HGB 12.6 12.6  --   --   --  13.0   MCV 96 96  --   --   --  92    315  --  320  --  299    143  --   --   --  145*   POTASSIUM 3.8 3.5  --  3.6   < > 3.2*   CHLORIDE 109 108  --   --   --  109   CO2 31 31  --   --   --  29   BUN 7 6*  --   --   --  8   CR 0.69 0.61 0.66 0.69  --  0.77   ANIONGAP 4 4  --   --   --  7   ASHIA 8.2* 7.9*  --   --   --  7.8*   GLC 97 112*  --   --   --  103*   ALBUMIN 2.2*  --   --   --   --   --    PROTTOTAL 6.0*  --   --   --   --   --    BILITOTAL 0.4  --   --   --   --   --    ALKPHOS 83  --   --   --   --   --    ALT 39  --   --   --   --   --    AST 28  --   --   --   --   --     < > = values in this interval not displayed.         Recent Results (from the past 24 hour(s))   US  Lower Extremity Venous Duplex Right Port    Narrative    VENOUS ULTRASOUND RIGHT LEG  11/15/2021 12:05 PM     HISTORY: Swelling. Pain.    COMPARISON: None.    FINDINGS:  Examination of the deep veins with graded compression and  color flow Doppler with spectral wave form analysis was performed,  images show no deep vein thrombosis in the left common femoral,  profunda femoral and proximal femoral vein. The mid femoral, distal  femoral, popliteal, and posterior tibial veins show color flow,  however patient could not tolerate compression. Peroneal vein was not  visualized.    Left line could not be evaluated due to patient refusing.      Impression    IMPRESSION:  1. Limited exam due to patient not tolerating the exam due to pain.  2. No deep vein thrombosis in the right common femoral, profunda  femoral and proximal femoral. The mid femoral, distal femoral,  popliteal and posterior tibial veins have normal color flow, however  patient would not allow compression due to pain.  3. Left leg was not evaluated due to patient's refusal.    SARKIS BILLS DO         SYSTEM ID:  [id number]

## 2021-11-17 ENCOUNTER — APPOINTMENT (OUTPATIENT)
Dept: OCCUPATIONAL THERAPY | Facility: CLINIC | Age: 76
DRG: 871 | End: 2021-11-17
Payer: COMMERCIAL

## 2021-11-17 ENCOUNTER — APPOINTMENT (OUTPATIENT)
Dept: PHYSICAL THERAPY | Facility: CLINIC | Age: 76
DRG: 871 | End: 2021-11-17
Payer: COMMERCIAL

## 2021-11-17 LAB
CRP SERPL-MCNC: 20.6 MG/L (ref 0–8)
ERYTHROCYTE [DISTWIDTH] IN BLOOD BY AUTOMATED COUNT: 15.1 % (ref 10–15)
HCT VFR BLD AUTO: 40.9 % (ref 35–47)
HGB BLD-MCNC: 12.5 G/DL (ref 11.7–15.7)
MAGNESIUM SERPL-MCNC: 2.2 MG/DL (ref 1.6–2.3)
MCH RBC QN AUTO: 29.3 PG (ref 26.5–33)
MCHC RBC AUTO-ENTMCNC: 30.6 G/DL (ref 31.5–36.5)
MCV RBC AUTO: 96 FL (ref 78–100)
PLATELET # BLD AUTO: 302 10E3/UL (ref 150–450)
POTASSIUM BLD-SCNC: 3.7 MMOL/L (ref 3.4–5.3)
RBC # BLD AUTO: 4.27 10E6/UL (ref 3.8–5.2)
WBC # BLD AUTO: 6.1 10E3/UL (ref 4–11)

## 2021-11-17 PROCEDURE — 86140 C-REACTIVE PROTEIN: CPT | Performed by: INTERNAL MEDICINE

## 2021-11-17 PROCEDURE — 85014 HEMATOCRIT: CPT | Performed by: INTERNAL MEDICINE

## 2021-11-17 PROCEDURE — 84132 ASSAY OF SERUM POTASSIUM: CPT | Performed by: INTERNAL MEDICINE

## 2021-11-17 PROCEDURE — 97530 THERAPEUTIC ACTIVITIES: CPT | Mod: GO | Performed by: REHABILITATION PRACTITIONER

## 2021-11-17 PROCEDURE — 250N000011 HC RX IP 250 OP 636: Performed by: INTERNAL MEDICINE

## 2021-11-17 PROCEDURE — 250N000013 HC RX MED GY IP 250 OP 250 PS 637: Performed by: CLINICAL NURSE SPECIALIST

## 2021-11-17 PROCEDURE — 36415 COLL VENOUS BLD VENIPUNCTURE: CPT | Performed by: INTERNAL MEDICINE

## 2021-11-17 PROCEDURE — 97530 THERAPEUTIC ACTIVITIES: CPT | Mod: GP | Performed by: PHYSICAL THERAPIST

## 2021-11-17 PROCEDURE — 250N000013 HC RX MED GY IP 250 OP 250 PS 637: Performed by: INTERNAL MEDICINE

## 2021-11-17 PROCEDURE — 99231 SBSQ HOSP IP/OBS SF/LOW 25: CPT | Performed by: INTERNAL MEDICINE

## 2021-11-17 PROCEDURE — 120N000001 HC R&B MED SURG/OB

## 2021-11-17 PROCEDURE — 83735 ASSAY OF MAGNESIUM: CPT | Performed by: INTERNAL MEDICINE

## 2021-11-17 RX ADMIN — CEPHALEXIN 500 MG: 500 CAPSULE ORAL at 11:53

## 2021-11-17 RX ADMIN — ACETAMINOPHEN 1000 MG: 500 TABLET, FILM COATED ORAL at 14:11

## 2021-11-17 RX ADMIN — ENOXAPARIN SODIUM 40 MG: 40 INJECTION SUBCUTANEOUS at 17:40

## 2021-11-17 RX ADMIN — CEPHALEXIN 500 MG: 500 CAPSULE ORAL at 00:42

## 2021-11-17 RX ADMIN — CEPHALEXIN 500 MG: 500 CAPSULE ORAL at 06:06

## 2021-11-17 RX ADMIN — MICONAZOLE NITRATE: 20 POWDER TOPICAL at 20:47

## 2021-11-17 RX ADMIN — ENOXAPARIN SODIUM 40 MG: 40 INJECTION SUBCUTANEOUS at 06:06

## 2021-11-17 RX ADMIN — ACETAMINOPHEN 1000 MG: 500 TABLET, FILM COATED ORAL at 20:53

## 2021-11-17 RX ADMIN — THERA TABS 1 TABLET: TAB at 08:16

## 2021-11-17 RX ADMIN — CEPHALEXIN 500 MG: 500 CAPSULE ORAL at 23:31

## 2021-11-17 RX ADMIN — CAMPHOR AND MENTHOL: 5; 5 LOTION TOPICAL at 17:40

## 2021-11-17 RX ADMIN — MICONAZOLE NITRATE: 20 POWDER TOPICAL at 08:16

## 2021-11-17 RX ADMIN — ACETAMINOPHEN 1000 MG: 500 TABLET, FILM COATED ORAL at 08:16

## 2021-11-17 RX ADMIN — CEPHALEXIN 500 MG: 500 CAPSULE ORAL at 17:40

## 2021-11-17 ASSESSMENT — ACTIVITIES OF DAILY LIVING (ADL)
ADLS_ACUITY_SCORE: 29
ADLS_ACUITY_SCORE: 31
ADLS_ACUITY_SCORE: 29
ADLS_ACUITY_SCORE: 31
ADLS_ACUITY_SCORE: 29
ADLS_ACUITY_SCORE: 31
ADLS_ACUITY_SCORE: 29
ADLS_ACUITY_SCORE: 29
ADLS_ACUITY_SCORE: 31
ADLS_ACUITY_SCORE: 29
ADLS_ACUITY_SCORE: 31
ADLS_ACUITY_SCORE: 29
ADLS_ACUITY_SCORE: 31
ADLS_ACUITY_SCORE: 31
ADLS_ACUITY_SCORE: 29
ADLS_ACUITY_SCORE: 29

## 2021-11-17 ASSESSMENT — MIFFLIN-ST. JEOR: SCORE: 2056.72

## 2021-11-17 NOTE — PLAN OF CARE
Pt A&O x4-forgetful @ times. Very anxious and becomes tearful @ times. VS stable; afebrile. PO tylenol and oxycodone managing pain. CMS: numbness and tingling to BLEs-lymph wraps on. Up w/ Ax2, using mechanical lift. Voiding in good amts-using purewick. Tolerating regular diet. Plan is TCU @ discharge. Will continue to monitor.

## 2021-11-17 NOTE — PLAN OF CARE
7PM-7AM RN     Patient vital signs are at baseline: Yes   Patient able to ambulate as they were prior to admission or with assist devices provided by therapies during their stay: No. Lift.   Patient MUST void prior to discharge: Yes, via pure wick.   Patient able to tolerate oral intake: Yes   Patient has adequate pain control using oral analgesics:  Yes

## 2021-11-18 ENCOUNTER — APPOINTMENT (OUTPATIENT)
Dept: PHYSICAL THERAPY | Facility: CLINIC | Age: 76
DRG: 871 | End: 2021-11-18
Payer: COMMERCIAL

## 2021-11-18 ENCOUNTER — APPOINTMENT (OUTPATIENT)
Dept: OCCUPATIONAL THERAPY | Facility: CLINIC | Age: 76
DRG: 871 | End: 2021-11-18
Payer: COMMERCIAL

## 2021-11-18 LAB
CREAT SERPL-MCNC: 0.67 MG/DL (ref 0.52–1.04)
CRP SERPL-MCNC: 26.9 MG/L (ref 0–8)
GFR SERPL CREATININE-BSD FRML MDRD: 87 ML/MIN/1.73M2
MAGNESIUM SERPL-MCNC: 2.1 MG/DL (ref 1.6–2.3)
PLATELET # BLD AUTO: 299 10E3/UL (ref 150–450)
POTASSIUM BLD-SCNC: 3.8 MMOL/L (ref 3.4–5.3)
SARS-COV-2 RNA RESP QL NAA+PROBE: NEGATIVE

## 2021-11-18 PROCEDURE — 82565 ASSAY OF CREATININE: CPT | Performed by: INTERNAL MEDICINE

## 2021-11-18 PROCEDURE — 250N000013 HC RX MED GY IP 250 OP 250 PS 637: Performed by: INTERNAL MEDICINE

## 2021-11-18 PROCEDURE — 36415 COLL VENOUS BLD VENIPUNCTURE: CPT | Performed by: INTERNAL MEDICINE

## 2021-11-18 PROCEDURE — 97530 THERAPEUTIC ACTIVITIES: CPT | Mod: GO

## 2021-11-18 PROCEDURE — 120N000001 HC R&B MED SURG/OB

## 2021-11-18 PROCEDURE — 97530 THERAPEUTIC ACTIVITIES: CPT | Mod: GP | Performed by: PHYSICAL THERAPIST

## 2021-11-18 PROCEDURE — 250N000011 HC RX IP 250 OP 636: Performed by: INTERNAL MEDICINE

## 2021-11-18 PROCEDURE — 99231 SBSQ HOSP IP/OBS SF/LOW 25: CPT | Performed by: INTERNAL MEDICINE

## 2021-11-18 PROCEDURE — 83735 ASSAY OF MAGNESIUM: CPT | Performed by: INTERNAL MEDICINE

## 2021-11-18 PROCEDURE — 86140 C-REACTIVE PROTEIN: CPT | Performed by: INTERNAL MEDICINE

## 2021-11-18 PROCEDURE — 87635 SARS-COV-2 COVID-19 AMP PRB: CPT | Performed by: INTERNAL MEDICINE

## 2021-11-18 PROCEDURE — 250N000013 HC RX MED GY IP 250 OP 250 PS 637: Performed by: CLINICAL NURSE SPECIALIST

## 2021-11-18 PROCEDURE — 85049 AUTOMATED PLATELET COUNT: CPT | Performed by: INTERNAL MEDICINE

## 2021-11-18 PROCEDURE — 84132 ASSAY OF SERUM POTASSIUM: CPT | Performed by: INTERNAL MEDICINE

## 2021-11-18 RX ADMIN — THERA TABS 1 TABLET: TAB at 09:04

## 2021-11-18 RX ADMIN — ACETAMINOPHEN 1000 MG: 500 TABLET, FILM COATED ORAL at 21:36

## 2021-11-18 RX ADMIN — ENOXAPARIN SODIUM 40 MG: 40 INJECTION SUBCUTANEOUS at 06:18

## 2021-11-18 RX ADMIN — MICONAZOLE NITRATE: 20 POWDER TOPICAL at 09:08

## 2021-11-18 RX ADMIN — ENOXAPARIN SODIUM 40 MG: 40 INJECTION SUBCUTANEOUS at 18:04

## 2021-11-18 RX ADMIN — MICONAZOLE NITRATE: 20 POWDER TOPICAL at 19:06

## 2021-11-18 RX ADMIN — CEPHALEXIN 500 MG: 500 CAPSULE ORAL at 14:41

## 2021-11-18 RX ADMIN — CEPHALEXIN 500 MG: 500 CAPSULE ORAL at 06:17

## 2021-11-18 RX ADMIN — CEPHALEXIN 500 MG: 500 CAPSULE ORAL at 18:04

## 2021-11-18 RX ADMIN — ACETAMINOPHEN 1000 MG: 500 TABLET, FILM COATED ORAL at 14:42

## 2021-11-18 RX ADMIN — ACETAMINOPHEN 1000 MG: 500 TABLET, FILM COATED ORAL at 09:03

## 2021-11-18 ASSESSMENT — ACTIVITIES OF DAILY LIVING (ADL)
ADLS_ACUITY_SCORE: 29

## 2021-11-18 NOTE — PLAN OF CARE
Pt a&o x4 but forgetful and anxious at times. VSS. Had a pleasant night. + 3 edema bilateral LE lymph wraps from knee down, continues to take oral antibiotics.  Plan is to discharge to TCU. Continue to monitor.

## 2021-11-18 NOTE — PLAN OF CARE
Pt A&O x4-forgetful @ times. VS stable; afebrile. PO tylenol managing pain. CMS: numbness and tingling to BLEs-lymph wraps on. Up w/ Ax2, using mechanical lift. Able to take steps from chair w/ Ax2, using gait belt, and walker. Voiding in good amts-using purewick. Tolerating regular diet. Plan is TCU @ discharge. Will continue to monitor.

## 2021-11-18 NOTE — PLAN OF CARE
A&Ox4. Forgetful and anxious. VSS. Lungs clear bilaterally. Denies pain. A2, ceiling  lift. Voids spontaneously with purewick in place. Last BM 11/16. Lymphedema wraps on. Generalized edema on left and right legs. Edema +2 ankles, +3 RLE, +4 RLE. Numbness in lower extremities. On magnesium and potassium protocol, labs ordered for tomorrow. Tolerating regular diet. Plan is to discharge to TCU.   1436 standing with PT/OT. Coccyx cleaned and severe edema to bilateral hips. Peeling skin to posterior side and hips. Cream antifungal applied. Anxious with standing. Tearful. Up in chair now with lift. Continue keflex for antibiotic.  1539 up in chair. Pleasant mood with talking to staff. anxious at times. No pain. Edema. Bariatric. Keflex. lymphedema black wraps on. 1807 back to bed with lift. Requires dependent cares for all ADLs except able to feed self and care for brushing teeth/washing face. Redness and skin breakdown to all folds, coccyx, legs. BM today. Voiding. Denies pain. Needing TCU placement.

## 2021-11-18 NOTE — PROGRESS NOTES
Care Management Follow Up    Length of Stay (days): 12    Expected Discharge Date: 11/19/2021     Concerns to be Addressed:  Bed availability for TCU     Patient plan of care discussed at interdisciplinary rounds: No    Anticipated Discharge Disposition:  TCU     Additional Information:  Met with pt to let her know that we will have to make referrals outside of what she requested d/t having troubles finding a TCU for her.  She became tearful but understood.    It appears [facility] has [facility] and [facility].  I have placed more referrals to [place], [facility], [facility] and [facility].      It doesn't appear we know the status of the other referrals that were placed since I only see a SW note from 11/12 and CC note from 11/9/21.    Plan:  Sw will f/u with all TCU referrals that were placed.  Pt will also need PAS once TCU is found.       Kaylynn THORNE, [facility]  Inpatient Care Coordination   [facility]   [phone]

## 2021-11-19 ENCOUNTER — APPOINTMENT (OUTPATIENT)
Dept: OCCUPATIONAL THERAPY | Facility: CLINIC | Age: 76
DRG: 871 | End: 2021-11-19
Payer: COMMERCIAL

## 2021-11-19 ENCOUNTER — APPOINTMENT (OUTPATIENT)
Dept: PHYSICAL THERAPY | Facility: CLINIC | Age: 76
DRG: 871 | End: 2021-11-19
Payer: COMMERCIAL

## 2021-11-19 LAB
MAGNESIUM SERPL-MCNC: 2.2 MG/DL (ref 1.6–2.3)
POTASSIUM BLD-SCNC: 3.8 MMOL/L (ref 3.4–5.3)

## 2021-11-19 PROCEDURE — 97535 SELF CARE MNGMENT TRAINING: CPT | Mod: GO | Performed by: REHABILITATION PRACTITIONER

## 2021-11-19 PROCEDURE — 84132 ASSAY OF SERUM POTASSIUM: CPT | Performed by: INTERNAL MEDICINE

## 2021-11-19 PROCEDURE — 250N000013 HC RX MED GY IP 250 OP 250 PS 637: Performed by: INTERNAL MEDICINE

## 2021-11-19 PROCEDURE — 99231 SBSQ HOSP IP/OBS SF/LOW 25: CPT | Performed by: INTERNAL MEDICINE

## 2021-11-19 PROCEDURE — 36415 COLL VENOUS BLD VENIPUNCTURE: CPT | Performed by: INTERNAL MEDICINE

## 2021-11-19 PROCEDURE — 250N000011 HC RX IP 250 OP 636: Performed by: INTERNAL MEDICINE

## 2021-11-19 PROCEDURE — 83735 ASSAY OF MAGNESIUM: CPT | Performed by: INTERNAL MEDICINE

## 2021-11-19 PROCEDURE — 120N000001 HC R&B MED SURG/OB

## 2021-11-19 PROCEDURE — 97530 THERAPEUTIC ACTIVITIES: CPT | Mod: GP | Performed by: PHYSICAL THERAPIST

## 2021-11-19 PROCEDURE — 250N000013 HC RX MED GY IP 250 OP 250 PS 637: Performed by: CLINICAL NURSE SPECIALIST

## 2021-11-19 RX ADMIN — THERA TABS 1 TABLET: TAB at 08:19

## 2021-11-19 RX ADMIN — ENOXAPARIN SODIUM 40 MG: 40 INJECTION SUBCUTANEOUS at 18:02

## 2021-11-19 RX ADMIN — MICONAZOLE NITRATE: 20 POWDER TOPICAL at 08:26

## 2021-11-19 RX ADMIN — MICONAZOLE NITRATE: 20 POWDER TOPICAL at 19:53

## 2021-11-19 RX ADMIN — ACETAMINOPHEN 1000 MG: 500 TABLET, FILM COATED ORAL at 19:53

## 2021-11-19 RX ADMIN — CAMPHOR AND MENTHOL: 5; 5 LOTION TOPICAL at 10:37

## 2021-11-19 RX ADMIN — ACETAMINOPHEN 1000 MG: 500 TABLET, FILM COATED ORAL at 13:38

## 2021-11-19 RX ADMIN — OXYCODONE HYDROCHLORIDE 5 MG: 5 TABLET ORAL at 06:45

## 2021-11-19 RX ADMIN — ACETAMINOPHEN 1000 MG: 500 TABLET, FILM COATED ORAL at 08:19

## 2021-11-19 RX ADMIN — ENOXAPARIN SODIUM 40 MG: 40 INJECTION SUBCUTANEOUS at 06:31

## 2021-11-19 RX ADMIN — OXYCODONE HYDROCHLORIDE 5 MG: 5 TABLET ORAL at 10:47

## 2021-11-19 ASSESSMENT — ACTIVITIES OF DAILY LIVING (ADL)
ADLS_ACUITY_SCORE: 31
ADLS_ACUITY_SCORE: 29
ADLS_ACUITY_SCORE: 27
ADLS_ACUITY_SCORE: 25
ADLS_ACUITY_SCORE: 27
ADLS_ACUITY_SCORE: 29
ADLS_ACUITY_SCORE: 31
ADLS_ACUITY_SCORE: 27
ADLS_ACUITY_SCORE: 25
ADLS_ACUITY_SCORE: 25
ADLS_ACUITY_SCORE: 27
ADLS_ACUITY_SCORE: 27
ADLS_ACUITY_SCORE: 29
ADLS_ACUITY_SCORE: 29
ADLS_ACUITY_SCORE: 31
ADLS_ACUITY_SCORE: 27
ADLS_ACUITY_SCORE: 31
ADLS_ACUITY_SCORE: 25
ADLS_ACUITY_SCORE: 27
ADLS_ACUITY_SCORE: 29
ADLS_ACUITY_SCORE: 29
ADLS_ACUITY_SCORE: 27

## 2021-11-19 NOTE — PROGRESS NOTES
Care Management Follow Up    Length of Stay (days): 13    Expected Discharge Date: 11/21/2021     Concerns to be Addressed:   Patient weights 335.       Referrals Placed by CM/SW:  SW sent additional referrals.    Private pay costs discussed: private room/amenity fees and transportation costs    Additional Information:  Patient is requiring a baratric placement (which has limited facilities).  SW followed up on referrals sent. At this time there are no beds. SW will continue to follow.      JOSE GUADALUPE Schmitt

## 2021-11-19 NOTE — CONSULTS
Consult Date: 11/19/2021    INFECTIOUS DISEASE CONSULTATION    LOCATION:  [facility], Room 646.    REFERRING PHYSICIAN:  Dr. Campbell Benitez.    IMPRESSION:    1.  A 76-year-old female 10-plus days in the hospital, admitted initially after a fall at home, found to have signs of sepsis with significant leukocytosis, procalcitonin elevation.  Blood cultures were negative.  Slight infiltrate, but no significant respiratory symptoms.  Likely left leg cellulitis as the cause.  Based on the initial presentation and pictures, this is likely streptococcal infection.  Got broad antibiotics, then oral Keflex now.  Overall, I suspect this is simply resolving cellulitis and stasis leg changes and not any residual active cellulitis.  2.  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  3.  Right hand inflammation probably from an IV site.  That resolved.  4.  Morbid obesity.  5.  Chronic venous stasis and leg edema issues.    RECOMMENDATIONS:    1.  Doubt any active cellulitis has been treated adequately.  I think it is okay to watch off antibiotics at this time.  The key issue with the legs is edema control measures, doing compression already now, and elevate legs as much as possible.  2.  Obvious long-term risk both for wound formation and cellulitis, but currently all signs are this is simply post-cellulitic skin changes.  She has a red area in her right lateral leg, but I do not think this is active cellulitis either.  Both labs and clinical picture would suggest resolved cellulitis.    HISTORY OF PRESENT ILLNESS:  This 76-year-old female is seen in consultation.  We have not been following.  She has been in the hospital for 12 days.  She had a fall at home where she has not been seen or had medical care for years.  I actually know her from 2018 when she last had medical care, at  which time she had a total hip arthroplasty that never was quite right and still has not been.  There were signs of infection at that time, but it was all superficial and no deep infection was ever found.  That area has been stable since.  At the time of her admission, she had fallen, could not get up, and had signs of sepsis at admission.  Blood cultures were negative.  Started on broad antibiotics with vancomycin and Zosyn, and a possible leg cellulitis as the diagnosis.  Got several days broad IV antibiotics over the hand area that got inflamed at the same time, also has resolved.  She basically feels quite well currently, does not have any clear localizing symptoms.  The legs are mildly uncomfortable, has a lot of edema, and cannot really move around much.  The right leg, which has not been thought to be the cellulitis leg, has an inflammatory area in the right lateral leg that probably is just stasis changes.  Otherwise, the legs do not look actively infected to me.    PAST MEDICAL HISTORY:    1.  Chronic edema without that much in the way of major cellulitis.   2.  Prior history of right hip with a superficial wound infection never looked to be deep and does not now.   3.  General failure to thrive at home.   4.  Chronic stasis and edema, long-term.   5.  Morbid obesity.   6.  Hypercholesterolemia.   7.  Hypertension.    ALLERGIES:  NO ANTIBIOTIC ALLERGIES.    SOCIAL AND FAMILY HISTORY:  Living independently prior to this, but barely functioning and now worse, probably placement needed.  No significant known resistant pathogens.    REVIEW OF SYSTEMS:  Largely as above.  She thinks her legs feel relatively okay now.  Does not feel toxic or ill.  Did not really think she had that before she came in admission, but had signs of sepsis at admission.    PHYSICAL EXAMINATION:    GENERAL:  The patient appears her stated age, looks chronically more than acutely ill.  She is afebrile, not toxic or ill looking in  general.  HEENT:  No thrush or intraoral lesions.  HEART AND LUNGS:  Unremarkable.  ABDOMEN:  Nontender.  EXTREMITIES:  Bilateral stasis changes.  Legs are wrapped.  Unwrapped them.  The left leg with what looks like a post-cellulitic type leg, not really warm to the touch.  No major signs of abscess formation.  The right leg has a lateral slightly erythematous area, but I do not think this is significant either.    LABORATORY DATA:  Initial white count 30,000, now down to normal.  Blood cultures were negative.  CRP minimally elevated at 17 on recheck, likely okay here.    Diony Nieto MD        D: 2021   T: 2021   MT: ALEJANDRO    Name:     LUCIANO JONES  MRN:      [id number]        Account:      [id number]   :      1945           Consult Date: 2021     Document: [id number]

## 2021-11-19 NOTE — PROGRESS NOTES
Hospitalist Medicine Progress Note   [facility]       Cristy Bailey is a 76 year old lady with essential hypertension, morbid obesity, prior hip surgery, hypercholesteremia who lives in a mobile home setting and moves around in a wheelchair.  According to significant other she did not leave her house for the past 4 years and after sliding from her wheelchair and significant other unable to get her up back to the wheelchair was brought in to [facility] on 11/6/2021.  She was diagnosed with tachycardia, left lower lobe pneumonia, cellulitis of right lower extremity, and 0.4 cm and nodule in the right middle lobe of the lung, cholelithiasis.  She was started on IV vancomycin and Zosyn at admission 11/6/2021 and with no growth in the blood and improvement of cellulitis this was changed on 11/11/2021 to oral cephalexin and later stopped 11/18/2021.  Awaiting placement       Date of Admission:  11/6/2021  Assessment & Plan     Probable early sepsis with lower extremity cellulitis  ?  Left lower lobe pneumonia  Antibiotics as mentioned above  CRP is appreciably coming down  Awaiting TCU  Placement    Cephalexin was discontinued on 11/18/2021    Fall  Failure to thrive  With severe deconditioning  Will need PT and OT treatment in the TCU    Lower extremity swelling  No DVT  Has lower Albumin so probably third spacing     Morbid obesity  BMI of 58    ? Essential hypertension  Per history patient is not on any antihypertensive medication    Hypercholesterolemia  Not on treatment at this time    Patient is DNR/DNI              Plan:   CBC in am with CRP   Discharge in 1-2 days bed is available      Diet: Combination Diet Regular Diet Adult  Diet    DVT Prophylaxis: Enoxaparin (Lovenox) SQ  Carrasquillo Catheter: Not present  Code Status: No CPR- Do NOT Intubate               The patient's care was discussed with the Patient      Campbell Benitez MD  Hospitalist Service  [facility]  [facility]  This note was created on 11/18/2021.patient was seen on 11/17/2021  ______________________________________________________________________    Interval History     Symptoms   Left lower extremity redness lstill presant I think this is chronic venous discoloration?  There is less pain in both lower extremities  Review of Systems:   He is worried about the lower extremity swelling    Data reviewed today: I reviewed all medications, new labs and imaging results over the last 24 hours.     Physical Exam   Vital Signs: Temp: 98.3  F (36.8  C) Temp src: Temporal BP: (!) 149/65 Pulse: 75   Resp: 20 SpO2: 92 % O2 Device: None (Room air)    Weight: 336 lbs 0 oz      GENERAL: Patient is not in acute distress  HEENT: EOM+,Conjunctiva is clear   NECK:  no Jugular Venous distention  HEART: S1 S2 regular Rate and Rhythm, there is  no murmur,   LUNGS: Respirations are  not laboured, Lungs are  clear to auscultation without Crepitations or Wheezing   ABDOMEN: Soft , there is no tenderness ,Bowel Sounds are Positive   LOWER LIMBS: Lower two thirds of the leg is erythematous on the left side pedal Edema Bilaterally   CNS:  Alert,  Oriented x 3, Moving all the Four Limbs     Data   Recent Labs   Lab 11/18/21  0801 11/17/21  0746 11/16/21  0600 11/15/21  0801 11/14/21  0933   WBC  --  6.1  --  7.3 8.5   HGB  --  12.5  --  12.6 12.6   MCV  --  96  --  96 96    302  --  315 315   NA  --   --   --  144 143   POTASSIUM 3.8 3.7 3.5 3.8 3.5   CHLORIDE  --   --   --  109 108   CO2  --   --   --  31 31   BUN  --   --   --  7 6*   CR 0.67  --   --  0.69 0.61   ANIONGAP  --   --   --  4 4   ASHIA  --   --   --  8.2* 7.9*   GLC  --   --   --  97 112*   ALBUMIN  --   --   --  2.2*  --    PROTTOTAL  --   --   --  6.0*  --    BILITOTAL  --   --   --  0.4  --    ALKPHOS  --   --   --  83  --    ALT  --   --   --  39  --    AST  --   --   --  28  --          No results found for this or any previous visit (from the past 24 hour(s)).

## 2021-11-19 NOTE — CONSULTS
ID consult dictated IMP 1 77 yo female at admit signs of sepsis, BC neg now 10+ days ABX with ? L leg celluitis, has stasis ans chronic changes, but doubt residual active cellulitis    REc OK off ABX elevate and leg compression alone

## 2021-11-19 NOTE — PLAN OF CARE
"/65 (BP Location: Left arm, Patient Position: Chair)   Pulse 69   Temp 96.8  F (36  C) (Temporal)   Resp 16   Ht 1.702 m (5' 7\")   Wt (!) 152.4 kg (336 lb)   SpO2 94%   BMI 52.63 kg/m      NEURO:A/Ox4, anxious at times needing reassurance  PAIN:C/O bilateral foot pain declines interventions  IV:NA  RESPIRATORY:LS- clear, denies SOB  GI:+ BS   : External cath   SKIN:Blanchable redness under bilateral breasts, abdominal folds and coccyx, 2-3+ BLE edema - lymph wraps in place, bruised,scabs, rash  ACTIVITY:Ax2 lift  DIET:Regular  PLAN: Wound care, ID consulted, PT/OT following, continue POC.  "

## 2021-11-19 NOTE — PLAN OF CARE
Pt Ox4. Anxious and tearful at times. VS WDL on room air. Rated pain in lower extremities a 5-6 and took 5mg oxy. Bilateral edema in lower extremities; lymphedema wraps in place. Ice to L ankle per pt request. Reposition q4h. Interdry and wicking powder under breasts and stomach. Purewick in place. Awaiting TCU placement; will continue to monitor.

## 2021-11-19 NOTE — PLAN OF CARE
VS-afebrile, stable,   Lung Sounds-diminished bases, using IS upto 1750, encouraged to use. No cough noted.   O2-on room air.   GI-+Bs, +flatus, lbm was 11/18 . Tolerating diet, denies nausea.   -ext purwick in place, has brief on.   IVF-  Dressings-interdry under breasts and abd folds.   CMS-has some numbness and tingling across the toes bilaterally, lymphedema wraps removed at 1045 for 45 minutes. Lotion applied to legs, feet, moderates swelling, +3 in rickie legs. Dry cracked feet, heels, ankles, toes. +pp.   Drain-none.   Activity-up to chair via lift with nursing. Has PT and OT. In chair at 1050- afternoon therapy session,   Pain-rates pain a 5 in L leg, martina ankle/foot. Oxycodone given for pain. Ice to L ankle. Tender to touch--in ankles and shins.   D/C Plan-to be determined. Sws following. ID consult.    Spot on R lower shin, tried to arsalan with skin marker,but lotioned legs made the marking difficult. Discolored and per family spot is growing. Dr. Benitez put in for ID consult.     Pt would like to go home with CHANI Bailey in trailer home. Does not want TCU at discharge time. Niece lives across the street. Able to help get her in the house. Leo available all time. Has lift chair, wc to get to bathroom, grab bars in bathroom. Informed PT

## 2021-11-19 NOTE — PROGRESS NOTES
Hospitalist Medicine Progress Note   [facility]       Cristy Bailey is a 76 year old lady with essential hypertension, morbid obesity, prior hip surgery, hypercholesteremia who lives in a mobile home setting and moves around in a wheelchair.  According to significant other she did not leave her house for the past 4 years and after sliding from her wheelchair and significant other unable to get her up back to the wheelchair was brought in to [facility] on 11/6/2021.  She was diagnosed with tachycardia, left lower lobe pneumonia, cellulitis of right lower extremity, and 0.4 cm and nodule in the right middle lobe of the lung, cholelithiasis.  She was started on IV vancomycin and Zosyn at admission 11/6/2021 and with no growth in the blood and improvement of cellulitis this was changed on 11/11/2021 to oral cephalexin.        Date of Admission:  11/6/2021  Assessment & Plan     Probable early sepsis with lower extremity cellulitis  ?  Left lower lobe pneumonia  Antibiotics as mentioned above  CRP is appreciably coming down  Awaiting TCU  Placement      Fall  Failure to thrive  With severe deconditioning  Will need PT and OT treatment in the TCU    Lower extremity swelling  No DVT  Has lower Albumin so probably third spacing     Morbid obesity  BMI of 58    ? Essential hypertension  Per history patient is not on any antihypertensive medication    Hypercholesterolemia  Not on treatment at this time    Patient is DNR/DNI              Plan:   CBC in am with CRP   Discharge in 1-2 days       Diet: Combination Diet Regular Diet Adult  Diet    DVT Prophylaxis: Enoxaparin (Lovenox) SQ  C[place]quillo Catheter: Not present  Code Status: No CPR- Do NOT Intubate               The patient's care was discussed with the Patient      Campbell Benitez MD  Hospitalist Service  [facility]  This note was created on 11/18/2021.patient was seen on  11/17/2021  ______________________________________________________________________    Interval History     Symptoms   Left lower extremity redness less but still presant    Pain is  less in the lower limbs     Review of Systems:   He is worried about the lower extremity swelling    Data reviewed today: I reviewed all medications, new labs and imaging results over the last 24 hours.     Physical Exam   Vital Signs: Temp: 98.3  F (36.8  C) Temp src: Temporal BP: (!) 149/65 Pulse: 75   Resp: 20 SpO2: 92 % O2 Device: None (Room air)    Weight: 336 lbs 0 oz      GENERAL: Patient is not in acute distress  HEENT: EOM+,Conjunctiva is clear   NECK:  no Jugular Venous distention  HEART: S1 S2 regular Rate and Rhythm, there is  no murmur,   LUNGS: Respirations are  not laboured, Lungs are  clear to auscultation without Crepitations or Wheezing   ABDOMEN: Soft , there is no tenderness ,Bowel Sounds are Positive   LOWER LIMBS: Lower two thirds of the leg is erythematous on the left side pedal Edema Bilaterally   CNS:  Alert,  Oriented x 3, Moving all the Four Limbs     Data   Recent Labs   Lab 11/18/21  0801 11/17/21  0746 11/16/21  0600 11/15/21  0801 11/14/21  0933   WBC  --  6.1  --  7.3 8.5   HGB  --  12.5  --  12.6 12.6   MCV  --  96  --  96 96    302  --  315 315   NA  --   --   --  144 143   POTASSIUM 3.8 3.7 3.5 3.8 3.5   CHLORIDE  --   --   --  109 108   CO2  --   --   --  31 31   BUN  --   --   --  7 6*   CR 0.67  --   --  0.69 0.61   ANIONGAP  --   --   --  4 4   ASHIA  --   --   --  8.2* 7.9*   GLC  --   --   --  97 112*   ALBUMIN  --   --   --  2.2*  --    PROTTOTAL  --   --   --  6.0*  --    BILITOTAL  --   --   --  0.4  --    ALKPHOS  --   --   --  83  --    ALT  --   --   --  39  --    AST  --   --   --  28  --          No results found for this or any previous visit (from the past 24 hour(s)).

## 2021-11-20 ENCOUNTER — APPOINTMENT (OUTPATIENT)
Dept: OCCUPATIONAL THERAPY | Facility: CLINIC | Age: 76
DRG: 871 | End: 2021-11-20
Payer: COMMERCIAL

## 2021-11-20 ENCOUNTER — APPOINTMENT (OUTPATIENT)
Dept: PHYSICAL THERAPY | Facility: CLINIC | Age: 76
DRG: 871 | End: 2021-11-20
Payer: COMMERCIAL

## 2021-11-20 LAB
HOLD SPECIMEN: NORMAL
MAGNESIUM SERPL-MCNC: 2.1 MG/DL (ref 1.6–2.3)
POTASSIUM BLD-SCNC: 3.8 MMOL/L (ref 3.4–5.3)

## 2021-11-20 PROCEDURE — 250N000013 HC RX MED GY IP 250 OP 250 PS 637: Performed by: CLINICAL NURSE SPECIALIST

## 2021-11-20 PROCEDURE — 97110 THERAPEUTIC EXERCISES: CPT | Mod: GO

## 2021-11-20 PROCEDURE — 36415 COLL VENOUS BLD VENIPUNCTURE: CPT | Performed by: INTERNAL MEDICINE

## 2021-11-20 PROCEDURE — 83735 ASSAY OF MAGNESIUM: CPT | Performed by: INTERNAL MEDICINE

## 2021-11-20 PROCEDURE — 84132 ASSAY OF SERUM POTASSIUM: CPT | Performed by: INTERNAL MEDICINE

## 2021-11-20 PROCEDURE — 97530 THERAPEUTIC ACTIVITIES: CPT | Mod: GP | Performed by: PHYSICAL THERAPIST

## 2021-11-20 PROCEDURE — 120N000001 HC R&B MED SURG/OB

## 2021-11-20 PROCEDURE — 250N000011 HC RX IP 250 OP 636: Performed by: INTERNAL MEDICINE

## 2021-11-20 PROCEDURE — 250N000013 HC RX MED GY IP 250 OP 250 PS 637: Performed by: INTERNAL MEDICINE

## 2021-11-20 PROCEDURE — 97535 SELF CARE MNGMENT TRAINING: CPT | Mod: GO

## 2021-11-20 PROCEDURE — 99231 SBSQ HOSP IP/OBS SF/LOW 25: CPT | Performed by: INTERNAL MEDICINE

## 2021-11-20 RX ADMIN — MICONAZOLE NITRATE: 20 POWDER TOPICAL at 08:07

## 2021-11-20 RX ADMIN — LORAZEPAM 0.5 MG: 0.5 TABLET ORAL at 20:13

## 2021-11-20 RX ADMIN — ACETAMINOPHEN 1000 MG: 500 TABLET, FILM COATED ORAL at 19:41

## 2021-11-20 RX ADMIN — MICONAZOLE NITRATE: 20 POWDER TOPICAL at 20:16

## 2021-11-20 RX ADMIN — CAMPHOR AND MENTHOL: 5; 5 LOTION TOPICAL at 23:07

## 2021-11-20 RX ADMIN — ACETAMINOPHEN 650 MG: 325 TABLET, FILM COATED ORAL at 06:06

## 2021-11-20 RX ADMIN — THERA TABS 1 TABLET: TAB at 08:06

## 2021-11-20 RX ADMIN — ENOXAPARIN SODIUM 40 MG: 40 INJECTION SUBCUTANEOUS at 06:06

## 2021-11-20 RX ADMIN — ENOXAPARIN SODIUM 40 MG: 40 INJECTION SUBCUTANEOUS at 17:34

## 2021-11-20 ASSESSMENT — ACTIVITIES OF DAILY LIVING (ADL)
ADLS_ACUITY_SCORE: 25
ADLS_ACUITY_SCORE: 25
ADLS_ACUITY_SCORE: 31
ADLS_ACUITY_SCORE: 31
ADLS_ACUITY_SCORE: 25
ADLS_ACUITY_SCORE: 25
ADLS_ACUITY_SCORE: 31
ADLS_ACUITY_SCORE: 25
ADLS_ACUITY_SCORE: 25
ADLS_ACUITY_SCORE: 31
ADLS_ACUITY_SCORE: 25
ADLS_ACUITY_SCORE: 25
ADLS_ACUITY_SCORE: 29
ADLS_ACUITY_SCORE: 25

## 2021-11-20 NOTE — PROGRESS NOTES
Hospitalist Medicine Progress Note   [facility]       Cristy Bailey is a 76 year old lady with essential hypertension, morbid obesity, prior hip surgery, hypercholesteremia who lives in a mobile home setting and moves around in a wheelchair.  According to significant other she did not leave her house for the past 4 years and after sliding from her wheelchair and significant other unable to get her up back to the wheelchair was brought in to [facility] on 11/6/2021.  She was diagnosed with tachycardia, left lower lobe pneumonia, cellulitis of right lower extremity, and 0.4 cm and nodule in the right middle lobe of the lung, cholelithiasis.  She was started on IV vancomycin and Zosyn at admission 11/6/2021 and with no growth in the blood and improvement of cellulitis this was changed on 11/11/2021 to oral cephalexin and later stopped 11/18/2021.  Awaiting placement       Date of Admission:  11/6/2021  Assessment & Plan     Probable early sepsis with lower extremity cellulitis  ?  Left lower lobe pneumonia  Antibiotics as mentioned above  CRP is appreciably coming down  Awaiting TCU  Placement    Cephalexin was discontinued on 11/18/2021    Fall  Failure to thrive  With severe deconditioning  Will need PT and OT treatment in the TCU    Lower extremity swelling  No DVT  Has lower Albumin so probably third spacing     Morbid obesity  BMI of 58    ? Essential hypertension  Per history patient is not on any antihypertensive medication    Hypercholesterolemia  Not on treatment at this time    Patient is DNR/DNI              Plan:   Consult ID regarding the left leg erythema   Discharge in 1-2 days bed is available      Diet: Combination Diet Regular Diet Adult  Diet    DVT Prophylaxis: Enoxaparin (Lovenox) SQ  Carrasquillo Catheter: Not present  Code Status: No CPR- Do NOT Intubate               The patient's care was discussed with the Patient      Campbell Benitez MD  Hospitalist Service    [facility]  This note was created on 11/18/2021.patient was seen on 11/17/2021  ______________________________________________________________________    Interval History     Symptoms   Left lower extremity redness is present still ?  chronic venous discoloration  There is less pain in both lower extremities    Review of Systems:   He is worried about the lower extremity swelling    Data reviewed today: I reviewed all medications, new labs and imaging results over the last 24 hours.     Physical Exam   Vital Signs: Temp: 98  F (36.7  C) Temp src: Temporal BP: (!) 149/73 Pulse: 82   Resp: 24 SpO2: 95 % O2 Device: None (Room air)    Weight: 336 lbs 0 oz      GENERAL: Patient is not in acute distress  HEENT: EOM+,Conjunctiva is clear   NECK:  no Jugular Venous distention  HEART: S1 S2 regular Rate and Rhythm, there is  no murmur,   LUNGS: Respirations are  not laboured, Lungs are  clear to auscultation without Crepitations or Wheezing   ABDOMEN: Soft , there is no tenderness ,Bowel Sounds are Positive   LOWER LIMBS: Lower two thirds of the leg is erythematous on the left side pedal Edema Bilaterally   CNS:  Alert,  Oriented x 3, Moving all the Four Limbs     Data   Recent Labs   Lab 11/20/21  0759 11/19/21  0620 11/18/21  0801 11/17/21  0746 11/16/21  0600 11/15/21  0801 11/14/21  0933   WBC  --   --   --  6.1  --  7.3 8.5   HGB  --   --   --  12.5  --  12.6 12.6   MCV  --   --   --  96  --  96 96   PLT  --   --  299 302  --  315 315   NA  --   --   --   --   --  144 143   POTASSIUM 3.8 3.8 3.8 3.7   < > 3.8 3.5   CHLORIDE  --   --   --   --   --  109 108   CO2  --   --   --   --   --  31 31   BUN  --   --   --   --   --  7 6*   CR  --   --  0.67  --   --  0.69 0.61   ANIONGAP  --   --   --   --   --  4 4   ASHIA  --   --   --   --   --  8.2* 7.9*   GLC  --   --   --   --   --  97 112*   ALBUMIN  --   --   --   --   --  2.2*  --    PROTTOTAL  --   --   --   --   --  6.0*  --    BILITOTAL  --   --    --   --   --  0.4  --    ALKPHOS  --   --   --   --   --  83  --    ALT  --   --   --   --   --  39  --    AST  --   --   --   --   --  28  --     < > = values in this interval not displayed.         No results found for this or any previous visit (from the past 24 hour(s)).

## 2021-11-20 NOTE — PROGRESS NOTES
Hospitalist Medicine Progress Note   [facility]       Cristy Bailey is a 76 year old lady with essential hypertension, morbid obesity, prior hip surgery, hypercholesteremia who lives in a mobile home setting and moves around in a wheelchair.  According to significant other she did not leave her house for the past 4 years and after sliding from her wheelchair and significant other unable to get her up back to the wheelchair was brought in to [facility] on 11/6/2021.  She was diagnosed with tachycardia, left lower lobe pneumonia, cellulitis of right lower extremity, and 0.4 cm and nodule in the right middle lobe of the lung, cholelithiasis.  She was started on IV vancomycin and Zosyn at admission 11/6/2021 and with no growth in the blood and improvement of cellulitis this was changed on 11/11/2021 to oral cephalexin and later stopped 11/18/2021.  Awaiting placement       Date of Admission:  11/6/2021  Assessment & Plan     Probable early sepsis with lower extremity cellulitis  ?  Left lower lobe pneumonia  Antibiotics as mentioned above  CRP is appreciably coming down  Awaiting TCU  Placement    Cephalexin was discontinued on 11/18/2021  Appreciate infectious disease consultation and recommendations not to give antibiotics at this time    Fall  Failure to thrive  With severe deconditioning  Will need PT and OT treatment in the TCU    Lower extremity swelling  No DVT  Has lower Albumin so probably third spacing     Morbid obesity  BMI of 58    ? Essential hypertension  Per history patient is not on any antihypertensive medication    Hypercholesterolemia  Not on treatment at this time    Patient is DNR/DNI              Plan:   Discharge in 1-2 days bed is available      Diet: Combination Diet Regular Diet Adult  Diet    DVT Prophylaxis: Enoxaparin (Lovenox) SQ  Carrasquillo Catheter: Not present  Code Status: No CPR- Do NOT Intubate               The patient's care was discussed with the  Patient      Campbell Benitez MD  Hospitalist Service  [facility]  This note was created on 11/18/2021.patient was seen on 11/17/2021  ______________________________________________________________________    Interval History     Symptoms   No new symptoms    Review of Systems:   No chest pain    Data reviewed today: I reviewed all medications, new labs and imaging results over the last 24 hours.     Physical Exam   Vital Signs: Temp: 98  F (36.7  C) Temp src: Temporal BP: (!) 149/73 Pulse: 82   Resp: 24 SpO2: 95 % O2 Device: None (Room air)    Weight: 336 lbs 0 oz      GENERAL: Patient is not in acute distress  HEENT: EOM+,Conjunctiva is clear   NECK:  no Jugular Venous distention  HEART: S1 S2 regular Rate and Rhythm, there is  no murmur,   LUNGS: Respirations are  not laboured, Lungs are  clear to auscultation without Crepitations or Wheezing   ABDOMEN: Soft , there is no tenderness ,Bowel Sounds are Positive   LOWER LIMBS: Lower two thirds of the leg is erythematous on the left side pedal Edema Bilaterally   CNS:  Alert,  Oriented x 3, Moving all the Four Limbs     Data   Recent Labs   Lab 11/20/21  0759 11/19/21  0620 11/18/21  0801 11/17/21  0746 11/16/21  0600 11/15/21  0801 11/14/21  0933   WBC  --   --   --  6.1  --  7.3 8.5   HGB  --   --   --  12.5  --  12.6 12.6   MCV  --   --   --  96  --  96 96   PLT  --   --  299 302  --  315 315   NA  --   --   --   --   --  144 143   POTASSIUM 3.8 3.8 3.8 3.7   < > 3.8 3.5   CHLORIDE  --   --   --   --   --  109 108   CO2  --   --   --   --   --  31 31   BUN  --   --   --   --   --  7 6*   CR  --   --  0.67  --   --  0.69 0.61   ANIONGAP  --   --   --   --   --  4 4   ASHIA  --   --   --   --   --  8.2* 7.9*   GLC  --   --   --   --   --  97 112*   ALBUMIN  --   --   --   --   --  2.2*  --    PROTTOTAL  --   --   --   --   --  6.0*  --    BILITOTAL  --   --   --   --   --  0.4  --    ALKPHOS  --   --   --   --   --  83  --    ALT  --   --   --   --    --  39  --    AST  --   --   --   --   --  28  --     < > = values in this interval not displayed.         No results found for this or any previous visit (from the past 24 hour(s)).

## 2021-11-20 NOTE — PLAN OF CARE
"[id number] RN    A&O with periods of forgetfulness and confusion.  VSS on RA.  Complained of bilateral foot pain and overall discomfort but refused all offers of pain medication until this morning, at which time her pain was 7/10.  Patient also stated she was shivering cold and couldn't get comfortable.  Once again, I explained to patient that we needed her to utilize her call light when she needed nursing assistance.  She maintained that she \"didn't want to bother us,\" but yet was seemingly upset that we didn't know she was in pain and cold.  Gave PRN tylenol as patient declined anything more.    Edema BLE.  Lungs clear.  Large BM on this shift.  Purewick.  Tolerating regular diet.  Two person assist to pivot to bedside commode, otherwise full lift to chair.  Will continue to monitor.  "

## 2021-11-20 NOTE — PROGRESS NOTES
Patient vital signs are at baseline: No,  Reason:  Pain rated 7/10  Patient able to ambulate as they were prior to admission or with assist devices provided by therapies during their stay:  No,  Reason:  Patient is Chairfast, AO2.   Patient MUST void prior to discharge:  No,  Reason:  Patient last BM 11/19, external catheter for UO.   Patient able to tolerate oral intake:  Yes  Pain has adequate pain control using Oral analgesics:  Yes         S-(situation): Patient is 76 year old Female, admitted to ED via EMS D/T fall on 11/06. Upon admit patient has Gen. Weakness and cellulitis of R/hip.     B-(background): Patient lives in mobile home with friend/roomate who is her primary caregiver. Patient has history of being unable to ambulate, uses wheelchair at home & requires assist x2 w/transfer.     A-(assessment):   Patient is currently reporting pain 7/10, w/numbness & tingling in LE. Patient has anxiety which causes agitation and distrust in care provided, refuses anti-anxiety medication & pain medication. Patient is A&O to person, place, and purpose, but not time. Patient verbalizes intent to go home.     R-(recommendations):   Maintain calm & quiet environment. Educate patient on purpose of hospital stay and interventions given.       Theo Amin

## 2021-11-20 NOTE — PROGRESS NOTES
Care Management Note    Pt identified as a [facility] Pt.  Following.    Rizwana Johnson RN BSN   Inpatient Care Coordination  [facility]  [phone]      Jackie Johnson, RN

## 2021-11-21 ENCOUNTER — APPOINTMENT (OUTPATIENT)
Dept: OCCUPATIONAL THERAPY | Facility: CLINIC | Age: 76
DRG: 871 | End: 2021-11-21
Payer: COMMERCIAL

## 2021-11-21 ENCOUNTER — APPOINTMENT (OUTPATIENT)
Dept: PHYSICAL THERAPY | Facility: CLINIC | Age: 76
DRG: 871 | End: 2021-11-21
Payer: COMMERCIAL

## 2021-11-21 LAB
CREAT SERPL-MCNC: 0.62 MG/DL (ref 0.52–1.04)
GFR SERPL CREATININE-BSD FRML MDRD: 89 ML/MIN/1.73M2
MAGNESIUM SERPL-MCNC: 2.2 MG/DL (ref 1.6–2.3)
PLATELET # BLD AUTO: 331 10E3/UL (ref 150–450)
POTASSIUM BLD-SCNC: 3.7 MMOL/L (ref 3.4–5.3)

## 2021-11-21 PROCEDURE — 120N000001 HC R&B MED SURG/OB

## 2021-11-21 PROCEDURE — 97535 SELF CARE MNGMENT TRAINING: CPT | Mod: GO

## 2021-11-21 PROCEDURE — 83735 ASSAY OF MAGNESIUM: CPT | Performed by: INTERNAL MEDICINE

## 2021-11-21 PROCEDURE — 250N000013 HC RX MED GY IP 250 OP 250 PS 637: Performed by: INTERNAL MEDICINE

## 2021-11-21 PROCEDURE — 84132 ASSAY OF SERUM POTASSIUM: CPT | Performed by: INTERNAL MEDICINE

## 2021-11-21 PROCEDURE — 97530 THERAPEUTIC ACTIVITIES: CPT | Mod: GP | Performed by: PHYSICAL THERAPIST

## 2021-11-21 PROCEDURE — 85049 AUTOMATED PLATELET COUNT: CPT | Performed by: INTERNAL MEDICINE

## 2021-11-21 PROCEDURE — 36415 COLL VENOUS BLD VENIPUNCTURE: CPT | Performed by: INTERNAL MEDICINE

## 2021-11-21 PROCEDURE — 250N000011 HC RX IP 250 OP 636: Performed by: INTERNAL MEDICINE

## 2021-11-21 PROCEDURE — 250N000013 HC RX MED GY IP 250 OP 250 PS 637: Performed by: CLINICAL NURSE SPECIALIST

## 2021-11-21 PROCEDURE — 82565 ASSAY OF CREATININE: CPT | Performed by: INTERNAL MEDICINE

## 2021-11-21 RX ADMIN — THERA TABS 1 TABLET: TAB at 07:59

## 2021-11-21 RX ADMIN — ACETAMINOPHEN 1000 MG: 500 TABLET, FILM COATED ORAL at 07:59

## 2021-11-21 RX ADMIN — MICONAZOLE NITRATE: 20 POWDER TOPICAL at 20:18

## 2021-11-21 RX ADMIN — ENOXAPARIN SODIUM 40 MG: 40 INJECTION SUBCUTANEOUS at 06:16

## 2021-11-21 RX ADMIN — CAMPHOR AND MENTHOL: 5; 5 LOTION TOPICAL at 09:50

## 2021-11-21 RX ADMIN — OXYCODONE HYDROCHLORIDE 5 MG: 5 TABLET ORAL at 09:53

## 2021-11-21 RX ADMIN — MICONAZOLE NITRATE: 20 POWDER TOPICAL at 08:00

## 2021-11-21 RX ADMIN — ACETAMINOPHEN 1000 MG: 500 TABLET, FILM COATED ORAL at 13:13

## 2021-11-21 RX ADMIN — ENOXAPARIN SODIUM 40 MG: 40 INJECTION SUBCUTANEOUS at 18:18

## 2021-11-21 ASSESSMENT — ACTIVITIES OF DAILY LIVING (ADL)
ADLS_ACUITY_SCORE: 25
ADLS_ACUITY_SCORE: 29
ADLS_ACUITY_SCORE: 25
ADLS_ACUITY_SCORE: 25
ADLS_ACUITY_SCORE: 29

## 2021-11-21 NOTE — PROGRESS NOTES
Patient vital signs are at baseline: No,  Reason:  Pain rated at 5/10.   Patient able to ambulate as they were prior to admission or with assist devices provided by therapies during their stay:  No,  Reason:  AO2 transfer w/lift  Patient MUST void prior to discharge:  No,  Reason:  Voiding regulary  Patient able to tolerate oral intake:  Yes  Pain has adequate pain control using Oral analgesics:  Yes         S-(situation): Patient is 76 year old Female, admitted to ED via EMS D/T fall on 11/06. Upon admit patient has Gen. Weakness and cellulitis of R/hip.     B-(background): Patient lives in mobile home with friend/roomate who is her primary caregiver. Patient has history of being unable to ambulate, uses wheelchair at home & required assist x1 w/transfer before fall.      A-(assessment):   Patient currently reporting pain 5/10, prescribed analgesic given. Patient reports reduced numbness & tingling in LE, only present in toes. Patient has anxiety which causes agitation and distrust in care provided, refuses anti-anxiety medication. Patient is A&Ox4. Patient verbalized desire to return home tomorrow. Patient is AO2 w/lift.     R-(recommendations):   Maintain calm & quiet environment. Educate patient on fall safety interventions and purpose of interventions given. Encourage reaching out to  r/t care after discharge.     Theo Amin

## 2021-11-21 NOTE — PLAN OF CARE
[phone] RN    A&O with periods of forgetfulness.  VSS on RA.  Scheduled tylenol for pain.  Ativan for severe episode of anxiety.  Lungs clear.  Bowels active.  Edema and compression wraps BLE.  Full lift from chair to bed.  Voiding via purewick.  Regular diet.  Patient states that she is discharging home today with or without MD approval.  She states she will sign AMA paperwork if needed.

## 2021-11-21 NOTE — PLAN OF CARE
VS-stable, afebrile, calm and cooperative today, happy.   Lung Sounds-clear, but diminished in the bases, no cough, using IS upto 1000  O2-on room air.   GI-+bs,,+flatus, lbm was Friday night per pt. Tolerating diet, denies nausea this shift,   -voiding via purwick. No void on the bsc this shift.   IVF-no iv access.   Dressings-brief on. Otherwise none.   CMS-+pp, numbness and tingling in toes bilater. Moderate lymphedema in both legs, compression wraps removed for one hour. Lotion applied to legs.   Drain-none.   Activity-up in chair at 1100. Took steps with PT this afternoon, total of 45 feet in room with pt. Up with 2 assist. Used lift to get pt in chair from the bed.   Pain-oxycodone given for L leg pain, ankle. Ice to L ankle. Rates pain a 5 down to a 4.   D/C Plan-home possible tomorrow. Leo is S.O. and is feeling sick today. Not been here visiting today. Lives with him.

## 2021-11-22 NOTE — DISCHARGE SUMMARY
[facility]  Discharge Summary  Hospitalist      Date of Admission:  11/6/2021  Date of Discharge:  11/22/2021  Provider:  Judy Hall MD  Date of Service (when I last saw the patient): 11/22/21      Primary Provider: Manoj Daley          Discharge Diagnosis:   Discharge Diagnoses   Probable early sepsis with lower extremity cellulitis  Possible left lower lobe pneumonia  Fall failure to thrive  Chronic stasis and lower extremity edema  Morbid obesity  Elevated blood pressure without diagnosis of hypertension suspect essential hypertension  Hyperlipidemia not on treatment  Patient left AGAINST MEDICAL ADVICE  Severe deconditioning  Imaging showed lung nodule will need outpatient follow-up  Strongly recommend outpatient follow-up for other chronic issues possible diagnosis of hypertension hyperlipidemia frequent falls related thrive deconditioning hypoalbuminemia and lung nodule    Other medical issues:  Past Medical History:   Diagnosis Date     Arthritis     hip     HTN (hypertension) 1/2010      Leg weakness 3/24/2015     Lyme disease 1980'S AND 2004    lYME DZ LIKE SXS RESPONDED TO ABX          History of Present Illness   Cristy Bailey is an 76 year old female who presented status post fall.  Please see the admission history and physical for full details.    Hospital Course     Cristy Bailey was admitted on 11/6/2021. 76 year old female longstanding history of hypertension, morbid obesity, prior hip surgery, dyslipidemia, hypertension, reportedly lives in a mobile home setting and moves around utilizing a wheelchair and reports they can earlier from patient's neighbors as per EMS report to ER that patient has not left the house of approximately 3 years already.  EMS personnel was activated earlier after apparently she had a fall event after sliding off from her recliner.  Her significant other attempted to help her but was not able to do so that led to EMS  activation.  On presentation she was diagnosed with left lower lobe pneumonia tachycardia cellulitis of lower extremity with 2.4 cm lung nodule she was initially started on treatment for IV vancomycin and Zosyn clinically cellulitis improved.  She was later switched to cephalexin.  Infectious disease was consulted.    PT OT evaluation recommended TCU placement for failure to thrive and frequent falls.  Patient is denying TCU placement and is requesting to return to her prior to hospitalization living arrangement.  She signed AGAINST MEDICAL ADVICE as she has very limited ability and is at risk for falling and failure to thrive    The following problems were addressed during her hospitalization:    Probable early sepsis with lower extremity cellulitis  Left lower lobe pneumonia  Treated with antibiotics  CRP improved  TCU recommended patient declined  Cephalexin was discontinued on 11/18/2021     Fall  Failure to thrive  With severe deconditioning  Will need PT and OT treatment   Patient refusing treatment left AGAINST MEDICAL ADVICE     Lower extremity swelling  No DVT  Has lower Albumin so probably third spacing      Morbid obesity  BMI of 58     Essential hypertension  Per history patient is not on any antihypertensive medication  She will need follow-up     Hypercholesterolemia  Not on treatment at this time  -Needs outpatient follow-up    Significant Results and Procedures   As noted above    Pending Results   Unresulted Labs Ordered in the Past 30 Days of this Admission     No orders found from 10/7/2021 to 11/7/2021.          Code Status   DNR / DNI       Primary Care Physician   Manoj Daley    Physical Exam   Temp: 97.4  F (36.3  C) Temp src: Temporal BP: 126/54 Pulse: 74   Resp: 18 SpO2: 92 % O2 Device: None (Room air)    Vitals:    11/08/21 0900 11/09/21 0900 11/17/21 1432   Weight: (!) 170.6 kg (376 lb) (!) 169.6 kg (374 lb) (!) 152.4 kg (336 lb)     Vital Signs with Ranges  Temp:  [97  F (36.1   C)-97.7  F (36.5  C)] 97.4  F (36.3  C)  Pulse:  [72-83] 74  Resp:  [18-20] 18  BP: (126-150)/(54-70) 126/54  SpO2:  [92 %-93 %] 92 %  I/O last 3 completed shifts:  In: 490 [P.O.:490]  Out: 950 [Urine:950]    Constitutional: Awake, alert, cooperative, no apparent distress, and appears stated age.  Morbidly obese  Respiratory: No increased work of breathing, poor inspiratory effort otherwise good air exchange, clear to auscultation bilaterally, no crackles or wheezing.  Cardiovascular: Normal apical impulse,normal S1 and S2,   GI:  normal bowel sounds, soft, non-distended, non-tender.      Discharge Disposition   Discharged to home  Left AGAINST MEDICAL ADVICE    Consultations This Hospital Stay   PHARMACY TO DOSE VANCO  PALLIATIVE CARE ADULT IP CONSULT  PHYSICAL THERAPY ADULT IP CONSULT  OCCUPATIONAL THERAPY ADULT IP CONSULT  CARE MANAGEMENT / SOCIAL WORK IP CONSULT  NUTRITION SERVICES ADULT IP CONSULT  PHARMACY TO DOSE VANCO  WOUND OSTOMY CONTINENCE NURSE  IP CONSULT  LYMPHEDEMA THERAPY IP CONSULT  VASCULAR ACCESS ADULT IP CONSULT  PHARMACY TO DOSE VANCO  ADVANCE DIRECTIVE IP CONSULT  PHYSICAL THERAPY ADULT IP CONSULT  OCCUPATIONAL THERAPY ADULT IP CONSULT  INFECTIOUS DISEASES IP CONSULT    Time Spent on this Encounter   I, Judy Hall MD, personally saw the patient today and spent greater than 30 minutes discharging this patient.    Discharge Orders      Care Coordination Referral      General info for SNF    Length of Stay Estimate: Short Term Care: Estimated # of Days <30  Condition at Discharge: Stable  Level of care:skilled   Rehabilitation Potential: Excellent  Admission H&P remains valid and up-to-date: Yes  Recent Chemotherapy: N/A  Use Nursing Home Standing Orders: Yes     Mantoux instructions    Give two-step Mantoux (PPD) Per Facility Policy Yes     Follow Up and recommended labs and tests    Follow up with Nursing home physician.     Reason for your hospital stay    Cellulitis     Activity -  Up ad stephanie     Physical Therapy Adult Consult    Evaluate and treat as clinically indicated.    Reason:  physical deconditioning     Occupational Therapy Adult Consult    Evaluate and treat as clinically indicated.    Reason:  physical deconditioning     Diet    Follow this diet upon discharge: Orders Placed This Encounter      Combination Diet Regular Diet Adult     Discharge Medications   Current Discharge Medication List      CONTINUE these medications which have NOT CHANGED    Details   albuterol (PROVENTIL) (2.5 MG/3ML) 0.083% neb solution Take 1 vial (2.5 mg) by nebulization every 6 hours as needed for shortness of breath / dyspnea or wheezing  Qty: 75 mL, Refills: 0    Associated Diagnoses: Cough           Allergies   Allergies   Allergen Reactions     No Known Drug Allergies      Data   Most Recent 3 CBC's:Recent Labs   Lab Test 11/21/21  0714 11/18/21  0801 11/17/21  0746 11/15/21  0801 11/14/21  0933   WBC  --   --  6.1 7.3 8.5   HGB  --   --  12.5 12.6 12.6   MCV  --   --  96 96 96    299 302 315 315      Most Recent 3 BMP's:  Recent Labs   Lab Test 11/22/21  0652 11/21/21  0714 11/20/21  0759 11/19/21  0620 11/18/21  0801 11/16/21  0600 11/15/21  0801 11/14/21  0933 11/11/21  1504 11/11/21  0740   NA  --   --   --   --   --   --  144 143  --  145*   POTASSIUM 3.8 3.7 3.8   < > 3.8   < > 3.8 3.5   < > 3.2*   CHLORIDE  --   --   --   --   --   --  109 108  --  109   CO2  --   --   --   --   --   --  31 31  --  29   BUN  --   --   --   --   --   --  7 6*  --  8   CR  --  0.62  --   --  0.67  --  0.69 0.61   < > 0.77   ANIONGAP  --   --   --   --   --   --  4 4  --  7   ASHIA  --   --   --   --   --   --  8.2* 7.9*  --  7.8*   GLC  --   --   --   --   --   --  97 112*  --  103*    < > = values in this interval not displayed.     Most Recent 2 LFT's:  Recent Labs   Lab Test 11/15/21  0801 11/07/21  0552   AST 28 35   ALT 39 29   ALKPHOS 83 91   BILITOTAL 0.4 0.7     Most Recent INR's and Anticoagulation  Dosing History:  Anticoagulation Dose History     Recent Dosing and Labs Latest Ref Rng & Units 6/26/2019 7/3/2019 7/10/2019 7/24/2019 8/7/2019 8/22/2019 9/4/2019    INR 0.86 - 1.14 1.4(A) 2.4(A) 2.5(A) 3.0(A) 2.9(A) 2.6(A) 3.4(A)        Most Recent 3 Troponin's:  Recent Labs   Lab Test 11/06/21  1254   TROPONIN <0.015     Most Recent Cholesterol Panel:  Recent Labs   Lab Test 03/25/14  0726   CHOL 203*   *   HDL 53   TRIG 64     Most Recent 6 Bacteria Isolates From Any Culture (See EPIC Reports for Culture Details):  Recent Labs   Lab Test 08/29/18  1244   CULT No anaerobes isolated  Moderate growth  Staphylococcus aureus  *  Canceled, Test credited  Test reordered as correct code  REORDERED AS A TISSUE CULTURE       Most Recent TSH, T4 and A1c Labs:  Recent Labs   Lab Test 08/25/17  0902 03/24/15  0827   TSH 2.00  --    A1C  --  5.9     Results for orders placed or performed during the hospital encounter of 11/06/21   CT Chest/Abdomen/Pelvis w Contrast    Narrative    EXAM: CT CHEST/ABDOMEN/PELVIS W CONTRAST  LOCATION: [facility]  DATE/TIME: 11/6/2021 4:30 PM    INDICATION: SOB, history of DVT, D-dimer 1.9, right hip redness, and decubitus, eval for pelvic infection.  COMPARISON: None.  TECHNIQUE: CT scan of the chest, abdomen, and pelvis was performed following injection of IV contrast. Multiplanar reformats were obtained. Dose reduction techniques were used.   CONTRAST: 100 mL Isovue-370    FINDINGS:   LUNGS AND PLEURA: Patchy opacity AP atelectasis at the left base, but developing pneumonia not excluded. No effusions or pneumothorax. Right middle lobe 0.4 cm nodule series 9 image 137. Adjacent calcified granuloma.    MEDIASTINUM/AXILLAE: No acute thoracic aortic abnormality. No pulmonary embolism can be seen. A few small thyroid nodules. No suspicious lymph node.    CORONARY ARTERY CALCIFICATION: Moderate.    HEPATOBILIARY: Cholelithiasis suggested. No acute liver  abnormality.    PANCREAS: Normal.    SPLEEN: Normal.    ADRENAL GLANDS: Normal.    KIDNEYS/BLADDER: Normal.    BOWEL: A few mildly distended small bowel loops. No convincing acute bowel abnormality. Limited visualization of the bowel. Appendix cannot be seen. No secondary signs of appendicitis.    LYMPH NODES: Normal.    VASCULATURE: Unremarkable.    PELVIC ORGANS: Obscured for assessment by streak artifact.    MUSCULOSKELETAL: Bilateral hip arthroplasties. No aggressive bone lesion. No clear subcutaneous fluid collections can be seen but assessment limited by body habitus and streak artifact at the pelvis and abdomen.      Impression    IMPRESSION:  1.  Mild consolidation versus atelectasis of the left lower lobe. Pneumonia at this position is possible.  2.  Cholelithiasis.  3.  Coronary artery calcifications.  4.  No acute abnormality otherwise seen with limitation as detailed above.   US Lower Extremity Venous Duplex Right Port    Narrative    VENOUS ULTRASOUND RIGHT LEG  11/15/2021 12:05 PM     HISTORY: Swelling. Pain.    COMPARISON: None.    FINDINGS:  Examination of the deep veins with graded compression and  color flow Doppler with spectral wave form analysis was performed,  images show no deep vein thrombosis in the left common femoral,  profunda femoral and proximal femoral vein. The mid femoral, distal  femoral, popliteal, and posterior tibial veins show color flow,  however patient could not tolerate compression. Peroneal vein was not  visualized.    Left line could not be evaluated due to patient refusing.      Impression    IMPRESSION:  1. Limited exam due to patient not tolerating the exam due to pain.  2. No deep vein thrombosis in the right common femoral, profunda  femoral and proximal femoral. The mid femoral, distal femoral,  popliteal and posterior tibial veins have normal color flow, however  patient would not allow compression due to pain.  3. Left leg was not evaluated due to patient's  refusal.    SARKIS BILLS DO         SYSTEM ID:  [id number]    Lower Extremity Venous Duplex Left Port    Narrative    ULTRASOUND LEFT LOWER EXTREMITY VENOUS DUPLEX PORTABLE November 16, 2021 3:06 PM    CLINICAL HISTORY: Swelling.    TECHNIQUE: Venous Duplex ultrasound of the left lower extremity with  and without compression, augmentation and duplex. Color flow and  spectral Doppler with waveform analysis performed.    COMPARISON: 11/15/2021.    FINDINGS: Exam includes the common femoral, femoral, popliteal, and  contralateral common femoral veins as well as segmentally visualized  deep calf veins and greater saphenous vein.     LEFT: No deep vein thrombosis. No superficial thrombophlebitis. No  popliteal cyst.      Impression    IMPRESSION: No deep venous thrombosis in the left lower extremity.    PERCY REDD MD         SYSTEM ID:  [id number]           Disclaimer: This note consists of symbols derived from keyboarding, dictation and/or voice recognition software. As a result, there may be errors in the script that have gone undetected. Please consider this when interpreting information found in this chart.

## 2021-11-22 NOTE — PLAN OF CARE
Physical Therapy Discharge Summary    Reason for therapy discharge:    Discharged to home.    Progress towards therapy goal(s). See goals on Care Plan in [facility] electronic health record for goal details.  Goals partially met.  Barriers to achieving goals:   discharge from facility.    Therapy recommendation(s):    Continued therapy is recommended.  Rationale/Recommendations:  Patient would benefit from continued IP PT  ARU/TCU to progress independence with functional mobility tasks and increase activity tolerance prior to returning home.  If patient were to decline, she would require 24/7 assistance and bedside commode.  Patient owns a manual wheelchair.  Patient would also require  PT as patient unable to ambulate community distances and leaving the home does require significant and taxing effort..

## 2021-11-22 NOTE — PLAN OF CARE
[id number] RN    A&O.  VSS on RA.  Denies pain, refused tylenol.  Edema BLE.  Compression wraps on.  Lift from chair to bed.  Voiding per purewick.  Bowels active.  Lungs clear.  Tolerating regular diet.  Hopes to discharge home today.

## 2021-11-23 NOTE — PLAN OF CARE
Occupational Therapy Discharge Summary    Reason for therapy discharge:    Discharged to home with outpatient therapy.    Progress towards therapy goal(s). See goals on Care Plan in [facility] electronic health record for goal details.  Goals partially met.  Barriers to achieving goals:   discharge from facility.    Therapy recommendation(s):    Continued therapy is recommended.  Rationale/Recommendations:  Patient is performing well below baseline at this time with her ability to complete self care, however patient is highly motivated to participate and improve and has made progress towards goals. She requires a lift for transfers in/out of bed. CGAx2, fww for sit<>stand and able to ambulate ~10 steps with support and cues for safety. Currently dependent for toileting, LE dressing and bathing cares Patient would benefit from ARU to increase strength/activity tolerance and (I) with ADLs to return to PLOF. If patient is not appropriate, recommend TCU. If patient is return home, she will require mechanical lift, keli commode for toileting and to get out of wheelchair. She will require physical assistance with all ADLs and IADLs. If discharges home would benefit from HHA/OT/PT/RN to assist with ADLs and improve mobilty. Patient is homebound due to the taxing effort and A x2 to leave home. Patient continues to benefit from IP OT services to assist in return to PLOF.

## 2021-11-23 NOTE — PROGRESS NOTES
Clinic Care Coordination Contact  [facility]/Voicemail       Clinical Data: Care Coordinator Outreach  Outreach attempted x 1. Unable to leave voicemail.   Plan: Care Coordinator will try to reach patient again in 1-2 business days.    Barbara Hurst, RN Care Coordinator     [facility] Ambulatory Care Management  [facility]

## 2021-11-23 NOTE — PROGRESS NOTES
HISTORY OF PRESENT ILLNESS:    Cristy Bailey is a 73 year old female who is seen in follow up for left JENAE, DOS 2/12/19, Dr. Chavo Rodriguez.  Present symptoms: Patient reports continued pain in the left lower leg. She states pain varies and is 3/10 today, up to 7/10 at times. She denies pain in her left hip.  She believes there was a small amount of drainage with last bandage change performed by home health nurse 2 days ago. She states she has been working on her foot range of motion, but is limited by her pain in the foot. Her partner Leo states at times her foot is so painful he cannot even place a finger on her.  She denies working on her walking throughout the day, and states it is very minimal and only with transfers to and from the wheel chair, and to use the restroom. Can however walk 20 feet and stand at sink to wash hair.  Does have complaints of balance issues if tries to leg go of walker. Is taking Oxycodone average 1-2 per day. Used 24 tabs in last 14 days.  Denies Chest pain, Calve pain, Fever, Chills.    Current Treatment: Oxycodone patient has 6 tabs remaining, takes primarily at nighttime to help sleep. Requests refill today.  Post op.    PHYSICAL EXAM:  /82 (BP Location: Right arm, Patient Position: Chair, Cuff Size: Adult Large)   There is no height or weight on file to calculate BMI.   GENERAL APPEARANCE: healthy, alert and no distress   PSYCH:  mentation appears normal and affect normal/bright/ less tearful today.  MSK:  Left: Hip .  Ambulates: Present in WC, transfers without help, uses strap for left foot.  Incision clean, creamy drainage present in mid and proximal incision where skin edges did not approximate, otherwise healing.  No incisional erythema.   No Ecchymosis.  No calve pain on palpation.  Edema Moderate to severe at foot and lower leg, pulses intact, warm to touch.  CMS: joesph incisional numbness, otherwise grossly intact, FULL SENSATION BOTTOM FOOT.  AROM ANkle:  Dorsiflexiion  10, P flexion 45.  PROM:  Ankle DOrsiflexion -15, P flexion 45.  Strength, ANkle dorsiflexion 4/5  Palpation:  No pain at hip or foot.       ASSESSMENT:  Cristy Bailey is a 73 year old female S/P left JENAE, DOS 2/12/19, Dr. Chavo Rodriguez.     Drainage appears to be more serous weeping at overlapping skin edges vs from deep wound.  ANkle ROM appears about the same.     PLAN:  - daily incision checks.  - WBAT - encouraged as much activity as tolerated.  - Hip restrictions.  - Refill oxycodone, with understanding needs to taper.    Return to clinic 2, weeks.    Donal Grover PA-C    Dept. Orthopedic Surgery  [facility]   4/3/2019       Rifampin Counseling: I discussed with the patient the risks of rifampin including but not limited to liver damage, kidney damage, red-orange body fluids, nausea/vomiting and severe allergy.

## 2021-11-24 NOTE — PROGRESS NOTES
Clinic Care Coordination Contact  [facility]/Voicemail       Clinical Data: Care Coordinator Outreach  Outreach attempted x 2.  Plan: Care Coordinator will send care coordination introduction letter with care coordinator contact information and explanation of care coordination services via mail. Care Coordinator will do no further outreaches at this time.    Barbara Hurst, RN Care Coordinator     [facility] Ambulatory Care Management  [facility]

## 2022-01-01 ENCOUNTER — APPOINTMENT (OUTPATIENT)
Dept: CT IMAGING | Facility: CLINIC | Age: 77
End: 2022-01-01
Attending: EMERGENCY MEDICINE
Payer: COMMERCIAL

## 2022-01-01 ENCOUNTER — VIRTUAL VISIT (OUTPATIENT)
Dept: FAMILY MEDICINE | Facility: CLINIC | Age: 77
End: 2022-01-01
Payer: COMMERCIAL

## 2022-01-01 ENCOUNTER — APPOINTMENT (OUTPATIENT)
Dept: PHYSICAL THERAPY | Facility: CLINIC | Age: 77
DRG: 023 | End: 2022-01-01
Payer: COMMERCIAL

## 2022-01-01 ENCOUNTER — DOCUMENTATION ONLY (OUTPATIENT)
Dept: OTHER | Facility: CLINIC | Age: 77
End: 2022-01-01

## 2022-01-01 ENCOUNTER — HOSPITAL ENCOUNTER (EMERGENCY)
Facility: CLINIC | Age: 77
DRG: 023 | End: 2022-01-01
Admitting: NEUROLOGICAL SURGERY
Payer: COMMERCIAL

## 2022-01-01 ENCOUNTER — APPOINTMENT (OUTPATIENT)
Dept: SPEECH THERAPY | Facility: CLINIC | Age: 77
DRG: 023 | End: 2022-01-01
Payer: COMMERCIAL

## 2022-01-01 ENCOUNTER — PATIENT OUTREACH (OUTPATIENT)
Dept: CARE COORDINATION | Facility: CLINIC | Age: 77
End: 2022-01-01

## 2022-01-01 ENCOUNTER — TELEPHONE (OUTPATIENT)
Dept: FAMILY MEDICINE | Facility: CLINIC | Age: 77
End: 2022-01-01

## 2022-01-01 ENCOUNTER — TELEPHONE (OUTPATIENT)
Dept: NEUROLOGY | Facility: CLINIC | Age: 77
End: 2022-01-01

## 2022-01-01 ENCOUNTER — APPOINTMENT (OUTPATIENT)
Dept: INTERVENTIONAL RADIOLOGY/VASCULAR | Facility: CLINIC | Age: 77
DRG: 023 | End: 2022-01-01
Payer: COMMERCIAL

## 2022-01-01 ENCOUNTER — TELEPHONE (OUTPATIENT)
Dept: FAMILY MEDICINE | Facility: CLINIC | Age: 77
End: 2022-01-01
Payer: COMMERCIAL

## 2022-01-01 ENCOUNTER — APPOINTMENT (OUTPATIENT)
Dept: OCCUPATIONAL THERAPY | Facility: CLINIC | Age: 77
DRG: 023 | End: 2022-01-01
Attending: INTERNAL MEDICINE
Payer: COMMERCIAL

## 2022-01-01 ENCOUNTER — APPOINTMENT (OUTPATIENT)
Dept: GENERAL RADIOLOGY | Facility: CLINIC | Age: 77
DRG: 023 | End: 2022-01-01
Attending: INTERNAL MEDICINE
Payer: COMMERCIAL

## 2022-01-01 ENCOUNTER — APPOINTMENT (OUTPATIENT)
Dept: OCCUPATIONAL THERAPY | Facility: CLINIC | Age: 77
DRG: 023 | End: 2022-01-01
Payer: COMMERCIAL

## 2022-01-01 ENCOUNTER — ANESTHESIA (OUTPATIENT)
Dept: SURGERY | Facility: CLINIC | Age: 77
DRG: 023 | End: 2022-01-01
Payer: COMMERCIAL

## 2022-01-01 ENCOUNTER — APPOINTMENT (OUTPATIENT)
Dept: PHYSICAL THERAPY | Facility: CLINIC | Age: 77
DRG: 023 | End: 2022-01-01
Attending: INTERNAL MEDICINE
Payer: COMMERCIAL

## 2022-01-01 ENCOUNTER — HEALTH MAINTENANCE LETTER (OUTPATIENT)
Age: 77
End: 2022-01-01

## 2022-01-01 ENCOUNTER — HOSPITAL ENCOUNTER (INPATIENT)
Facility: CLINIC | Age: 77
LOS: 132 days | DRG: 023 | End: 2022-11-22
Admitting: INTERNAL MEDICINE
Payer: COMMERCIAL

## 2022-01-01 ENCOUNTER — APPOINTMENT (OUTPATIENT)
Dept: CT IMAGING | Facility: CLINIC | Age: 77
DRG: 023 | End: 2022-01-01
Attending: NURSE PRACTITIONER
Payer: COMMERCIAL

## 2022-01-01 ENCOUNTER — TELEPHONE (OUTPATIENT)
Dept: NURSING | Facility: CLINIC | Age: 77
End: 2022-01-01
Payer: COMMERCIAL

## 2022-01-01 ENCOUNTER — APPOINTMENT (OUTPATIENT)
Dept: SPEECH THERAPY | Facility: CLINIC | Age: 77
DRG: 023 | End: 2022-01-01
Attending: INTERNAL MEDICINE
Payer: COMMERCIAL

## 2022-01-01 ENCOUNTER — APPOINTMENT (OUTPATIENT)
Dept: CARDIOLOGY | Facility: CLINIC | Age: 77
DRG: 023 | End: 2022-01-01
Attending: INTERNAL MEDICINE
Payer: COMMERCIAL

## 2022-01-01 ENCOUNTER — APPOINTMENT (OUTPATIENT)
Dept: PHYSICAL THERAPY | Facility: CLINIC | Age: 77
DRG: 023 | End: 2022-01-01
Attending: SPECIALIST
Payer: COMMERCIAL

## 2022-01-01 ENCOUNTER — APPOINTMENT (OUTPATIENT)
Dept: CT IMAGING | Facility: CLINIC | Age: 77
DRG: 023 | End: 2022-01-01
Attending: PHYSICIAN ASSISTANT
Payer: COMMERCIAL

## 2022-01-01 ENCOUNTER — APPOINTMENT (OUTPATIENT)
Dept: CT IMAGING | Facility: CLINIC | Age: 77
DRG: 023 | End: 2022-01-01
Attending: INTERNAL MEDICINE
Payer: COMMERCIAL

## 2022-01-01 ENCOUNTER — APPOINTMENT (OUTPATIENT)
Dept: GENERAL RADIOLOGY | Facility: CLINIC | Age: 77
End: 2022-01-01
Attending: EMERGENCY MEDICINE
Payer: COMMERCIAL

## 2022-01-01 ENCOUNTER — ANESTHESIA EVENT (OUTPATIENT)
Dept: SURGERY | Facility: CLINIC | Age: 77
DRG: 023 | End: 2022-01-01
Payer: COMMERCIAL

## 2022-01-01 ENCOUNTER — MEDICAL CORRESPONDENCE (OUTPATIENT)
Dept: HEALTH INFORMATION MANAGEMENT | Facility: CLINIC | Age: 77
End: 2022-01-01

## 2022-01-01 ENCOUNTER — APPOINTMENT (OUTPATIENT)
Dept: ULTRASOUND IMAGING | Facility: CLINIC | Age: 77
DRG: 023 | End: 2022-01-01
Attending: INTERNAL MEDICINE
Payer: COMMERCIAL

## 2022-01-01 ENCOUNTER — PATIENT OUTREACH (OUTPATIENT)
Dept: CARE COORDINATION | Facility: CLINIC | Age: 77
End: 2022-01-01
Payer: COMMERCIAL

## 2022-01-01 ENCOUNTER — APPOINTMENT (OUTPATIENT)
Dept: GENERAL RADIOLOGY | Facility: CLINIC | Age: 77
DRG: 023 | End: 2022-01-01
Attending: STUDENT IN AN ORGANIZED HEALTH CARE EDUCATION/TRAINING PROGRAM
Payer: COMMERCIAL

## 2022-01-01 ENCOUNTER — APPOINTMENT (OUTPATIENT)
Dept: CT IMAGING | Facility: CLINIC | Age: 77
DRG: 023 | End: 2022-01-01
Payer: COMMERCIAL

## 2022-01-01 ENCOUNTER — APPOINTMENT (OUTPATIENT)
Dept: ULTRASOUND IMAGING | Facility: CLINIC | Age: 77
DRG: 023 | End: 2022-01-01
Attending: HOSPITALIST
Payer: COMMERCIAL

## 2022-01-01 ENCOUNTER — HOSPITAL ENCOUNTER (EMERGENCY)
Facility: CLINIC | Age: 77
Discharge: ANOTHER HOSPITAL | End: 2022-07-13
Attending: EMERGENCY MEDICINE | Admitting: EMERGENCY MEDICINE
Payer: COMMERCIAL

## 2022-01-01 VITALS
RESPIRATION RATE: 16 BRPM | SYSTOLIC BLOOD PRESSURE: 145 MMHG | OXYGEN SATURATION: 100 % | HEART RATE: 88 BPM | TEMPERATURE: 97.7 F | DIASTOLIC BLOOD PRESSURE: 96 MMHG | WEIGHT: 293 LBS | BODY MASS INDEX: 60.74 KG/M2

## 2022-01-01 VITALS
HEIGHT: 66 IN | OXYGEN SATURATION: 98 % | TEMPERATURE: 97.8 F | BODY MASS INDEX: 47.09 KG/M2 | DIASTOLIC BLOOD PRESSURE: 44 MMHG | HEART RATE: 89 BPM | WEIGHT: 293 LBS | SYSTOLIC BLOOD PRESSURE: 114 MMHG | RESPIRATION RATE: 16 BRPM

## 2022-01-01 VITALS — HEART RATE: 75 BPM

## 2022-01-01 DIAGNOSIS — I66.9 CEREBRAL ARTERY OCCLUSION: ICD-10-CM

## 2022-01-01 DIAGNOSIS — B02.9 HERPES ZOSTER WITHOUT COMPLICATION: ICD-10-CM

## 2022-01-01 DIAGNOSIS — E78.5 HYPERLIPIDEMIA LDL GOAL <160: ICD-10-CM

## 2022-01-01 DIAGNOSIS — E66.01 MORBID OBESITY (H): ICD-10-CM

## 2022-01-01 DIAGNOSIS — B02.9 HERPES ZOSTER WITHOUT COMPLICATION: Primary | ICD-10-CM

## 2022-01-01 DIAGNOSIS — R21 RASH AND NONSPECIFIC SKIN ERUPTION: Primary | ICD-10-CM

## 2022-01-01 DIAGNOSIS — R26.2 UNABLE TO AMBULATE: ICD-10-CM

## 2022-01-01 DIAGNOSIS — I63.9 CEREBROVASCULAR ACCIDENT (CVA), UNSPECIFIED MECHANISM (H): ICD-10-CM

## 2022-01-01 DIAGNOSIS — J02.9 SORE THROAT: ICD-10-CM

## 2022-01-01 DIAGNOSIS — I63.539 CEREBROVASCULAR ACCIDENT (CVA) DUE TO OCCLUSION OF POSTERIOR CEREBRAL ARTERY, UNSPECIFIED BLOOD VESSEL LATERALITY (H): Primary | ICD-10-CM

## 2022-01-01 DIAGNOSIS — B37.2 INTERTRIGINOUS CANDIDIASIS: ICD-10-CM

## 2022-01-01 DIAGNOSIS — I63.9 CVA (CEREBRAL VASCULAR ACCIDENT) (H): ICD-10-CM

## 2022-01-01 DIAGNOSIS — R26.2 UNABLE TO AMBULATE: Primary | ICD-10-CM

## 2022-01-01 DIAGNOSIS — L89.136: ICD-10-CM

## 2022-01-01 DIAGNOSIS — K59.00 CONSTIPATION, UNSPECIFIED CONSTIPATION TYPE: ICD-10-CM

## 2022-01-01 DIAGNOSIS — I50.33 ACUTE ON CHRONIC DIASTOLIC HEART FAILURE (H): ICD-10-CM

## 2022-01-01 DIAGNOSIS — I10 ESSENTIAL HYPERTENSION: ICD-10-CM

## 2022-01-01 DIAGNOSIS — R29.898 LEG WEAKNESS: ICD-10-CM

## 2022-01-01 LAB
ACANTHOCYTES BLD QL SMEAR: SLIGHT
ALBUMIN SERPL-MCNC: 1.3 G/DL (ref 3.4–5)
ALBUMIN SERPL-MCNC: 1.4 G/DL (ref 3.4–5)
ALBUMIN SERPL-MCNC: 1.5 G/DL (ref 3.4–5)
ALBUMIN SERPL-MCNC: 1.6 G/DL (ref 3.4–5)
ALBUMIN SERPL-MCNC: 1.6 G/DL (ref 3.4–5)
ALBUMIN UR-MCNC: 10 MG/DL
ALBUMIN UR-MCNC: NEGATIVE MG/DL
ALP SERPL-CCNC: 102 U/L (ref 40–150)
ALT SERPL W P-5'-P-CCNC: 14 U/L (ref 0–50)
AMYLASE SERPL-CCNC: 7 U/L (ref 30–110)
ANION GAP SERPL CALCULATED.3IONS-SCNC: 10 MMOL/L (ref 3–14)
ANION GAP SERPL CALCULATED.3IONS-SCNC: 12 MMOL/L (ref 3–14)
ANION GAP SERPL CALCULATED.3IONS-SCNC: 3 MMOL/L (ref 3–14)
ANION GAP SERPL CALCULATED.3IONS-SCNC: 4 MMOL/L (ref 3–14)
ANION GAP SERPL CALCULATED.3IONS-SCNC: 5 MMOL/L (ref 3–14)
ANION GAP SERPL CALCULATED.3IONS-SCNC: 6 MMOL/L (ref 3–14)
ANION GAP SERPL CALCULATED.3IONS-SCNC: 7 MMOL/L (ref 3–14)
ANION GAP SERPL CALCULATED.3IONS-SCNC: 8 MMOL/L (ref 3–14)
ANION GAP SERPL CALCULATED.3IONS-SCNC: 9 MMOL/L (ref 3–14)
ANION GAP SERPL CALCULATED.3IONS-SCNC: 9 MMOL/L (ref 3–14)
APPEARANCE UR: ABNORMAL
APPEARANCE UR: CLEAR
APTT PPP: 21 SECONDS (ref 22–38)
APTT PPP: 25 SECONDS (ref 22–38)
AST SERPL W P-5'-P-CCNC: 30 U/L (ref 0–45)
ATRIAL RATE - MUSE: 78 BPM
ATRIAL RATE - MUSE: 80 BPM
ATRIAL RATE - MUSE: 81 BPM
BACTERIA #/AREA URNS HPF: ABNORMAL /HPF
BACTERIA ABSC ANAEROBE+AEROBE CULT: ABNORMAL
BACTERIA BLD CULT: NO GROWTH
BACTERIA BLD CULT: NO GROWTH
BACTERIA TISS BX CULT: ABNORMAL
BACTERIA TISS BX CULT: NORMAL
BACTERIA UR CULT: NORMAL
BASE EXCESS BLDA CALC-SCNC: 1.6 MMOL/L (ref -9–1.8)
BASE EXCESS BLDV CALC-SCNC: 3.2 MMOL/L (ref -7.7–1.9)
BASOPHILS # BLD AUTO: 0 10E3/UL (ref 0–0.2)
BASOPHILS # BLD AUTO: 0 10E3/UL (ref 0–0.2)
BASOPHILS # BLD AUTO: 0.1 10E3/UL (ref 0–0.2)
BASOPHILS # BLD AUTO: 0.1 10E3/UL (ref 0–0.2)
BASOPHILS # BLD MANUAL: 0 10E3/UL (ref 0–0.2)
BASOPHILS NFR BLD AUTO: 0 %
BASOPHILS NFR BLD AUTO: 1 %
BASOPHILS NFR BLD MANUAL: 0 %
BILIRUB SERPL-MCNC: 0.5 MG/DL (ref 0.2–1.3)
BILIRUB UR QL STRIP: ABNORMAL
BILIRUB UR QL STRIP: NEGATIVE
BUN SERPL-MCNC: 10 MG/DL (ref 7–30)
BUN SERPL-MCNC: 11 MG/DL (ref 7–30)
BUN SERPL-MCNC: 12 MG/DL (ref 7–30)
BUN SERPL-MCNC: 12 MG/DL (ref 7–30)
BUN SERPL-MCNC: 13 MG/DL (ref 7–30)
BUN SERPL-MCNC: 14 MG/DL (ref 7–30)
BUN SERPL-MCNC: 15 MG/DL (ref 7–30)
BUN SERPL-MCNC: 16 MG/DL (ref 7–30)
BUN SERPL-MCNC: 16 MG/DL (ref 7–30)
BUN SERPL-MCNC: 17 MG/DL (ref 7–30)
BUN SERPL-MCNC: 18 MG/DL (ref 7–30)
BUN SERPL-MCNC: 18 MG/DL (ref 7–30)
BUN SERPL-MCNC: 19 MG/DL (ref 7–30)
BUN SERPL-MCNC: 20 MG/DL (ref 7–30)
BUN SERPL-MCNC: 20 MG/DL (ref 7–30)
BUN SERPL-MCNC: 21 MG/DL (ref 7–30)
BUN SERPL-MCNC: 27 MG/DL (ref 7–30)
BUN SERPL-MCNC: 28 MG/DL (ref 7–30)
BUN SERPL-MCNC: 36 MG/DL (ref 7–30)
BUN SERPL-MCNC: 38 MG/DL (ref 7–30)
BUN SERPL-MCNC: 6 MG/DL (ref 7–30)
CA-I BLD-MCNC: 4.4 MG/DL (ref 4.4–5.2)
CALCIUM SERPL-MCNC: 6.1 MG/DL (ref 8.5–10.1)
CALCIUM SERPL-MCNC: 7.5 MG/DL (ref 8.5–10.1)
CALCIUM SERPL-MCNC: 7.6 MG/DL (ref 8.5–10.1)
CALCIUM SERPL-MCNC: 7.6 MG/DL (ref 8.5–10.1)
CALCIUM SERPL-MCNC: 7.7 MG/DL (ref 8.5–10.1)
CALCIUM SERPL-MCNC: 7.7 MG/DL (ref 8.5–10.1)
CALCIUM SERPL-MCNC: 7.8 MG/DL (ref 8.5–10.1)
CALCIUM SERPL-MCNC: 7.9 MG/DL (ref 8.5–10.1)
CALCIUM SERPL-MCNC: 8 MG/DL (ref 8.5–10.1)
CALCIUM SERPL-MCNC: 8.1 MG/DL (ref 8.5–10.1)
CALCIUM SERPL-MCNC: 8.2 MG/DL (ref 8.5–10.1)
CALCIUM SERPL-MCNC: 8.2 MG/DL (ref 8.5–10.1)
CALCIUM SERPL-MCNC: 8.3 MG/DL (ref 8.5–10.1)
CALCIUM SERPL-MCNC: 8.4 MG/DL (ref 8.5–10.1)
CALCIUM SERPL-MCNC: 8.5 MG/DL (ref 8.5–10.1)
CALCIUM SERPL-MCNC: 8.5 MG/DL (ref 8.5–10.1)
CALCIUM SERPL-MCNC: 8.6 MG/DL (ref 8.5–10.1)
CALCIUM SERPL-MCNC: 8.7 MG/DL (ref 8.5–10.1)
CALCIUM SERPL-MCNC: 8.7 MG/DL (ref 8.5–10.1)
CALCIUM SERPL-MCNC: 8.8 MG/DL (ref 8.5–10.1)
CALCIUM SERPL-MCNC: 8.8 MG/DL (ref 8.5–10.1)
CALCIUM SERPL-MCNC: 8.9 MG/DL (ref 8.5–10.1)
CALCIUM SERPL-MCNC: 9.1 MG/DL (ref 8.5–10.1)
CALCIUM SERPL-MCNC: 9.1 MG/DL (ref 8.5–10.1)
CHLORIDE BLD-SCNC: 100 MMOL/L (ref 94–109)
CHLORIDE BLD-SCNC: 101 MMOL/L (ref 94–109)
CHLORIDE BLD-SCNC: 102 MMOL/L (ref 94–109)
CHLORIDE BLD-SCNC: 103 MMOL/L (ref 94–109)
CHLORIDE BLD-SCNC: 104 MMOL/L (ref 94–109)
CHLORIDE BLD-SCNC: 105 MMOL/L (ref 94–109)
CHLORIDE BLD-SCNC: 106 MMOL/L (ref 94–109)
CHLORIDE BLD-SCNC: 107 MMOL/L (ref 94–109)
CHLORIDE BLD-SCNC: 108 MMOL/L (ref 94–109)
CHLORIDE BLD-SCNC: 109 MMOL/L (ref 94–109)
CHLORIDE BLD-SCNC: 110 MMOL/L (ref 94–109)
CHLORIDE BLD-SCNC: 112 MMOL/L (ref 94–109)
CHLORIDE BLD-SCNC: 114 MMOL/L (ref 94–109)
CHLORIDE BLD-SCNC: 114 MMOL/L (ref 94–109)
CHLORIDE BLD-SCNC: 99 MMOL/L (ref 94–109)
CHOLEST SERPL-MCNC: 163 MG/DL
CO2 SERPL-SCNC: 18 MMOL/L (ref 20–32)
CO2 SERPL-SCNC: 20 MMOL/L (ref 20–32)
CO2 SERPL-SCNC: 22 MMOL/L (ref 20–32)
CO2 SERPL-SCNC: 23 MMOL/L (ref 20–32)
CO2 SERPL-SCNC: 25 MMOL/L (ref 20–32)
CO2 SERPL-SCNC: 25 MMOL/L (ref 20–32)
CO2 SERPL-SCNC: 26 MMOL/L (ref 20–32)
CO2 SERPL-SCNC: 26 MMOL/L (ref 20–32)
CO2 SERPL-SCNC: 27 MMOL/L (ref 20–32)
CO2 SERPL-SCNC: 28 MMOL/L (ref 20–32)
CO2 SERPL-SCNC: 29 MMOL/L (ref 20–32)
CO2 SERPL-SCNC: 30 MMOL/L (ref 20–32)
CO2 SERPL-SCNC: 31 MMOL/L (ref 20–32)
CO2 SERPL-SCNC: 32 MMOL/L (ref 20–32)
CO2 SERPL-SCNC: 32 MMOL/L (ref 20–32)
CO2 SERPL-SCNC: 33 MMOL/L (ref 20–32)
CO2 SERPL-SCNC: 33 MMOL/L (ref 20–32)
CO2 SERPL-SCNC: 34 MMOL/L (ref 20–32)
COLOR UR AUTO: ABNORMAL
COLOR UR AUTO: YELLOW
CORTIS SERPL-MCNC: 35.8 UG/DL
CORTIS SERPL-MCNC: 91.5 UG/DL
CREAT SERPL-MCNC: 0.43 MG/DL (ref 0.52–1.04)
CREAT SERPL-MCNC: 0.46 MG/DL (ref 0.52–1.04)
CREAT SERPL-MCNC: 0.51 MG/DL (ref 0.52–1.04)
CREAT SERPL-MCNC: 0.54 MG/DL (ref 0.52–1.04)
CREAT SERPL-MCNC: 0.55 MG/DL (ref 0.52–1.04)
CREAT SERPL-MCNC: 0.56 MG/DL (ref 0.52–1.04)
CREAT SERPL-MCNC: 0.57 MG/DL (ref 0.52–1.04)
CREAT SERPL-MCNC: 0.58 MG/DL (ref 0.52–1.04)
CREAT SERPL-MCNC: 0.58 MG/DL (ref 0.52–1.04)
CREAT SERPL-MCNC: 0.59 MG/DL (ref 0.52–1.04)
CREAT SERPL-MCNC: 0.59 MG/DL (ref 0.52–1.04)
CREAT SERPL-MCNC: 0.62 MG/DL (ref 0.52–1.04)
CREAT SERPL-MCNC: 0.63 MG/DL (ref 0.52–1.04)
CREAT SERPL-MCNC: 0.64 MG/DL (ref 0.52–1.04)
CREAT SERPL-MCNC: 0.65 MG/DL (ref 0.52–1.04)
CREAT SERPL-MCNC: 0.65 MG/DL (ref 0.52–1.04)
CREAT SERPL-MCNC: 0.66 MG/DL (ref 0.52–1.04)
CREAT SERPL-MCNC: 0.66 MG/DL (ref 0.52–1.04)
CREAT SERPL-MCNC: 0.67 MG/DL (ref 0.52–1.04)
CREAT SERPL-MCNC: 0.68 MG/DL (ref 0.52–1.04)
CREAT SERPL-MCNC: 0.69 MG/DL (ref 0.52–1.04)
CREAT SERPL-MCNC: 0.69 MG/DL (ref 0.52–1.04)
CREAT SERPL-MCNC: 0.7 MG/DL (ref 0.52–1.04)
CREAT SERPL-MCNC: 0.71 MG/DL (ref 0.52–1.04)
CREAT SERPL-MCNC: 0.72 MG/DL (ref 0.52–1.04)
CREAT SERPL-MCNC: 0.73 MG/DL (ref 0.52–1.04)
CREAT SERPL-MCNC: 0.74 MG/DL (ref 0.52–1.04)
CREAT SERPL-MCNC: 0.76 MG/DL (ref 0.52–1.04)
CREAT SERPL-MCNC: 0.79 MG/DL (ref 0.52–1.04)
CREAT SERPL-MCNC: 0.86 MG/DL (ref 0.52–1.04)
CREAT SERPL-MCNC: 0.87 MG/DL (ref 0.52–1.04)
CREAT SERPL-MCNC: 1.02 MG/DL (ref 0.52–1.04)
CREAT SERPL-MCNC: 1.24 MG/DL (ref 0.52–1.04)
CREAT SERPL-MCNC: 2.08 MG/DL (ref 0.52–1.04)
CRP SERPL-MCNC: 241 MG/L (ref 0–8)
CRP SERPL-MCNC: 379 MG/L (ref 0–8)
DIASTOLIC BLOOD PRESSURE - MUSE: NORMAL MMHG
EOSINOPHIL # BLD AUTO: 0.4 10E3/UL (ref 0–0.7)
EOSINOPHIL # BLD AUTO: 0.5 10E3/UL (ref 0–0.7)
EOSINOPHIL # BLD AUTO: 0.6 10E3/UL (ref 0–0.7)
EOSINOPHIL # BLD AUTO: 0.6 10E3/UL (ref 0–0.7)
EOSINOPHIL # BLD MANUAL: 0.2 10E3/UL (ref 0–0.7)
EOSINOPHIL NFR BLD AUTO: 5 %
EOSINOPHIL NFR BLD AUTO: 6 %
EOSINOPHIL NFR BLD AUTO: 8 %
EOSINOPHIL NFR BLD AUTO: 9 %
EOSINOPHIL NFR BLD MANUAL: 3 %
ERYTHROCYTE [DISTWIDTH] IN BLOOD BY AUTOMATED COUNT: 14.1 % (ref 10–15)
ERYTHROCYTE [DISTWIDTH] IN BLOOD BY AUTOMATED COUNT: 14.2 % (ref 10–15)
ERYTHROCYTE [DISTWIDTH] IN BLOOD BY AUTOMATED COUNT: 14.2 % (ref 10–15)
ERYTHROCYTE [DISTWIDTH] IN BLOOD BY AUTOMATED COUNT: 14.3 % (ref 10–15)
ERYTHROCYTE [DISTWIDTH] IN BLOOD BY AUTOMATED COUNT: 14.4 % (ref 10–15)
ERYTHROCYTE [DISTWIDTH] IN BLOOD BY AUTOMATED COUNT: 14.5 % (ref 10–15)
ERYTHROCYTE [DISTWIDTH] IN BLOOD BY AUTOMATED COUNT: 14.6 % (ref 10–15)
ERYTHROCYTE [DISTWIDTH] IN BLOOD BY AUTOMATED COUNT: 14.8 % (ref 10–15)
ERYTHROCYTE [DISTWIDTH] IN BLOOD BY AUTOMATED COUNT: 14.9 % (ref 10–15)
ERYTHROCYTE [DISTWIDTH] IN BLOOD BY AUTOMATED COUNT: 15.3 % (ref 10–15)
ERYTHROCYTE [DISTWIDTH] IN BLOOD BY AUTOMATED COUNT: 16 % (ref 10–15)
ERYTHROCYTE [DISTWIDTH] IN BLOOD BY AUTOMATED COUNT: 16 % (ref 10–15)
ERYTHROCYTE [DISTWIDTH] IN BLOOD BY AUTOMATED COUNT: 16.2 % (ref 10–15)
ERYTHROCYTE [DISTWIDTH] IN BLOOD BY AUTOMATED COUNT: 16.5 % (ref 10–15)
ERYTHROCYTE [DISTWIDTH] IN BLOOD BY AUTOMATED COUNT: 16.6 % (ref 10–15)
ERYTHROCYTE [DISTWIDTH] IN BLOOD BY AUTOMATED COUNT: 16.9 % (ref 10–15)
ERYTHROCYTE [DISTWIDTH] IN BLOOD BY AUTOMATED COUNT: 17.3 % (ref 10–15)
ERYTHROCYTE [DISTWIDTH] IN BLOOD BY AUTOMATED COUNT: 17.5 % (ref 10–15)
ERYTHROCYTE [DISTWIDTH] IN BLOOD BY AUTOMATED COUNT: 17.5 % (ref 10–15)
ERYTHROCYTE [DISTWIDTH] IN BLOOD BY AUTOMATED COUNT: 17.7 % (ref 10–15)
ERYTHROCYTE [DISTWIDTH] IN BLOOD BY AUTOMATED COUNT: 17.7 % (ref 10–15)
ERYTHROCYTE [DISTWIDTH] IN BLOOD BY AUTOMATED COUNT: 18 % (ref 10–15)
ERYTHROCYTE [DISTWIDTH] IN BLOOD BY AUTOMATED COUNT: 18 % (ref 10–15)
GFR SERPL CREATININE-BSD FRML MDRD: 24 ML/MIN/1.73M2
GFR SERPL CREATININE-BSD FRML MDRD: 45 ML/MIN/1.73M2
GFR SERPL CREATININE-BSD FRML MDRD: 57 ML/MIN/1.73M2
GFR SERPL CREATININE-BSD FRML MDRD: 69 ML/MIN/1.73M2
GFR SERPL CREATININE-BSD FRML MDRD: 70 ML/MIN/1.73M2
GFR SERPL CREATININE-BSD FRML MDRD: 78 ML/MIN/1.73M2
GFR SERPL CREATININE-BSD FRML MDRD: 81 ML/MIN/1.73M2
GFR SERPL CREATININE-BSD FRML MDRD: 84 ML/MIN/1.73M2
GFR SERPL CREATININE-BSD FRML MDRD: 85 ML/MIN/1.73M2
GFR SERPL CREATININE-BSD FRML MDRD: 87 ML/MIN/1.73M2
GFR SERPL CREATININE-BSD FRML MDRD: 88 ML/MIN/1.73M2
GFR SERPL CREATININE-BSD FRML MDRD: 90 ML/MIN/1.73M2
GFR SERPL CREATININE-BSD FRML MDRD: >90 ML/MIN/1.73M2
GLUCOSE BLD-MCNC: 100 MG/DL (ref 70–99)
GLUCOSE BLD-MCNC: 100 MG/DL (ref 70–99)
GLUCOSE BLD-MCNC: 101 MG/DL (ref 70–99)
GLUCOSE BLD-MCNC: 102 MG/DL (ref 70–99)
GLUCOSE BLD-MCNC: 104 MG/DL (ref 70–99)
GLUCOSE BLD-MCNC: 104 MG/DL (ref 70–99)
GLUCOSE BLD-MCNC: 105 MG/DL (ref 70–99)
GLUCOSE BLD-MCNC: 106 MG/DL (ref 70–99)
GLUCOSE BLD-MCNC: 106 MG/DL (ref 70–99)
GLUCOSE BLD-MCNC: 107 MG/DL (ref 70–99)
GLUCOSE BLD-MCNC: 109 MG/DL (ref 70–99)
GLUCOSE BLD-MCNC: 110 MG/DL (ref 70–99)
GLUCOSE BLD-MCNC: 110 MG/DL (ref 70–99)
GLUCOSE BLD-MCNC: 111 MG/DL (ref 70–99)
GLUCOSE BLD-MCNC: 115 MG/DL (ref 70–99)
GLUCOSE BLD-MCNC: 121 MG/DL (ref 70–99)
GLUCOSE BLD-MCNC: 124 MG/DL (ref 70–99)
GLUCOSE BLD-MCNC: 130 MG/DL (ref 70–99)
GLUCOSE BLD-MCNC: 137 MG/DL (ref 70–99)
GLUCOSE BLD-MCNC: 254 MG/DL (ref 70–99)
GLUCOSE BLD-MCNC: 463 MG/DL (ref 70–99)
GLUCOSE BLD-MCNC: 65 MG/DL (ref 70–99)
GLUCOSE BLD-MCNC: 87 MG/DL (ref 70–99)
GLUCOSE BLD-MCNC: 89 MG/DL (ref 70–99)
GLUCOSE BLD-MCNC: 90 MG/DL (ref 70–99)
GLUCOSE BLD-MCNC: 90 MG/DL (ref 70–99)
GLUCOSE BLD-MCNC: 91 MG/DL (ref 70–99)
GLUCOSE BLD-MCNC: 91 MG/DL (ref 70–99)
GLUCOSE BLD-MCNC: 93 MG/DL (ref 70–99)
GLUCOSE BLD-MCNC: 94 MG/DL (ref 70–99)
GLUCOSE BLD-MCNC: 95 MG/DL (ref 70–99)
GLUCOSE BLD-MCNC: 96 MG/DL (ref 70–99)
GLUCOSE BLD-MCNC: 97 MG/DL (ref 70–99)
GLUCOSE BLD-MCNC: 99 MG/DL (ref 70–99)
GLUCOSE BLD-MCNC: 99 MG/DL (ref 70–99)
GLUCOSE BLDC GLUCOMTR-MCNC: 102 MG/DL (ref 70–99)
GLUCOSE BLDC GLUCOMTR-MCNC: 102 MG/DL (ref 70–99)
GLUCOSE BLDC GLUCOMTR-MCNC: 104 MG/DL (ref 70–99)
GLUCOSE BLDC GLUCOMTR-MCNC: 105 MG/DL (ref 70–99)
GLUCOSE BLDC GLUCOMTR-MCNC: 106 MG/DL (ref 70–99)
GLUCOSE BLDC GLUCOMTR-MCNC: 108 MG/DL (ref 70–99)
GLUCOSE BLDC GLUCOMTR-MCNC: 108 MG/DL (ref 70–99)
GLUCOSE BLDC GLUCOMTR-MCNC: 109 MG/DL (ref 70–99)
GLUCOSE BLDC GLUCOMTR-MCNC: 110 MG/DL (ref 70–99)
GLUCOSE BLDC GLUCOMTR-MCNC: 110 MG/DL (ref 70–99)
GLUCOSE BLDC GLUCOMTR-MCNC: 112 MG/DL (ref 70–99)
GLUCOSE BLDC GLUCOMTR-MCNC: 112 MG/DL (ref 70–99)
GLUCOSE BLDC GLUCOMTR-MCNC: 119 MG/DL (ref 70–99)
GLUCOSE BLDC GLUCOMTR-MCNC: 120 MG/DL (ref 70–99)
GLUCOSE BLDC GLUCOMTR-MCNC: 122 MG/DL (ref 70–99)
GLUCOSE BLDC GLUCOMTR-MCNC: 124 MG/DL (ref 70–99)
GLUCOSE BLDC GLUCOMTR-MCNC: 129 MG/DL (ref 70–99)
GLUCOSE BLDC GLUCOMTR-MCNC: 129 MG/DL (ref 70–99)
GLUCOSE BLDC GLUCOMTR-MCNC: 133 MG/DL (ref 70–99)
GLUCOSE BLDC GLUCOMTR-MCNC: 148 MG/DL (ref 70–99)
GLUCOSE BLDC GLUCOMTR-MCNC: 270 MG/DL (ref 70–99)
GLUCOSE BLDC GLUCOMTR-MCNC: 71 MG/DL (ref 70–99)
GLUCOSE BLDC GLUCOMTR-MCNC: 72 MG/DL (ref 70–99)
GLUCOSE BLDC GLUCOMTR-MCNC: 73 MG/DL (ref 70–99)
GLUCOSE BLDC GLUCOMTR-MCNC: 77 MG/DL (ref 70–99)
GLUCOSE BLDC GLUCOMTR-MCNC: 81 MG/DL (ref 70–99)
GLUCOSE BLDC GLUCOMTR-MCNC: 84 MG/DL (ref 70–99)
GLUCOSE BLDC GLUCOMTR-MCNC: 86 MG/DL (ref 70–99)
GLUCOSE BLDC GLUCOMTR-MCNC: 86 MG/DL (ref 70–99)
GLUCOSE BLDC GLUCOMTR-MCNC: 88 MG/DL (ref 70–99)
GLUCOSE BLDC GLUCOMTR-MCNC: 89 MG/DL (ref 70–99)
GLUCOSE BLDC GLUCOMTR-MCNC: 90 MG/DL (ref 70–99)
GLUCOSE BLDC GLUCOMTR-MCNC: 96 MG/DL (ref 70–99)
GLUCOSE BLDC GLUCOMTR-MCNC: 96 MG/DL (ref 70–99)
GLUCOSE BLDC GLUCOMTR-MCNC: 98 MG/DL (ref 70–99)
GLUCOSE BLDC GLUCOMTR-MCNC: 98 MG/DL (ref 70–99)
GLUCOSE BLDC GLUCOMTR-MCNC: 99 MG/DL (ref 70–99)
GLUCOSE UR STRIP-MCNC: NEGATIVE MG/DL
GLUCOSE UR STRIP-MCNC: NEGATIVE MG/DL
GRAM STAIN RESULT: ABNORMAL
HBA1C MFR BLD: 5.4 % (ref 0–5.6)
HBA1C MFR BLD: 5.5 % (ref 0–5.6)
HCO3 BLD-SCNC: 27 MMOL/L (ref 21–28)
HCO3 BLDV-SCNC: 28 MMOL/L (ref 21–28)
HCT VFR BLD AUTO: 30.7 % (ref 35–47)
HCT VFR BLD AUTO: 33.1 % (ref 35–47)
HCT VFR BLD AUTO: 33.5 % (ref 35–47)
HCT VFR BLD AUTO: 33.5 % (ref 35–47)
HCT VFR BLD AUTO: 36.4 % (ref 35–47)
HCT VFR BLD AUTO: 37 % (ref 35–47)
HCT VFR BLD AUTO: 37 % (ref 35–47)
HCT VFR BLD AUTO: 37.5 % (ref 35–47)
HCT VFR BLD AUTO: 38.3 % (ref 35–47)
HCT VFR BLD AUTO: 38.5 % (ref 35–47)
HCT VFR BLD AUTO: 39.2 % (ref 35–47)
HCT VFR BLD AUTO: 39.6 % (ref 35–47)
HCT VFR BLD AUTO: 39.8 % (ref 35–47)
HCT VFR BLD AUTO: 39.9 % (ref 35–47)
HCT VFR BLD AUTO: 40 % (ref 35–47)
HCT VFR BLD AUTO: 40.2 % (ref 35–47)
HCT VFR BLD AUTO: 40.6 % (ref 35–47)
HCT VFR BLD AUTO: 40.9 % (ref 35–47)
HCT VFR BLD AUTO: 41.7 % (ref 35–47)
HCT VFR BLD AUTO: 42.2 % (ref 35–47)
HCT VFR BLD AUTO: 42.4 % (ref 35–47)
HCT VFR BLD AUTO: 42.5 % (ref 35–47)
HCT VFR BLD AUTO: 43 % (ref 35–47)
HCT VFR BLD AUTO: 43.4 % (ref 35–47)
HCT VFR BLD AUTO: 43.6 % (ref 35–47)
HCT VFR BLD AUTO: 44 % (ref 35–47)
HCT VFR BLD AUTO: 44.4 % (ref 35–47)
HCT VFR BLD AUTO: 45.8 % (ref 35–47)
HCT VFR BLD AUTO: 46.4 % (ref 35–47)
HCT VFR BLD AUTO: 46.6 % (ref 35–47)
HDLC SERPL-MCNC: 47 MG/DL
HGB BLD-MCNC: 10.7 G/DL (ref 11.7–15.7)
HGB BLD-MCNC: 10.8 G/DL (ref 11.7–15.7)
HGB BLD-MCNC: 10.9 G/DL (ref 11.7–15.7)
HGB BLD-MCNC: 11.3 G/DL (ref 11.7–15.7)
HGB BLD-MCNC: 11.5 G/DL (ref 11.7–15.7)
HGB BLD-MCNC: 11.5 G/DL (ref 11.7–15.7)
HGB BLD-MCNC: 11.7 G/DL (ref 11.7–15.7)
HGB BLD-MCNC: 11.7 G/DL (ref 11.7–15.7)
HGB BLD-MCNC: 12.1 G/DL (ref 11.7–15.7)
HGB BLD-MCNC: 12.3 G/DL (ref 11.7–15.7)
HGB BLD-MCNC: 12.5 G/DL (ref 11.7–15.7)
HGB BLD-MCNC: 12.7 G/DL (ref 11.7–15.7)
HGB BLD-MCNC: 12.8 G/DL (ref 11.7–15.7)
HGB BLD-MCNC: 12.9 G/DL (ref 11.7–15.7)
HGB BLD-MCNC: 12.9 G/DL (ref 11.7–15.7)
HGB BLD-MCNC: 13.1 G/DL (ref 11.7–15.7)
HGB BLD-MCNC: 13.2 G/DL (ref 11.7–15.7)
HGB BLD-MCNC: 13.2 G/DL (ref 11.7–15.7)
HGB BLD-MCNC: 13.3 G/DL (ref 11.7–15.7)
HGB BLD-MCNC: 13.4 G/DL (ref 11.7–15.7)
HGB BLD-MCNC: 13.4 G/DL (ref 11.7–15.7)
HGB BLD-MCNC: 13.5 G/DL (ref 11.7–15.7)
HGB BLD-MCNC: 13.5 G/DL (ref 11.7–15.7)
HGB BLD-MCNC: 13.6 G/DL (ref 11.7–15.7)
HGB BLD-MCNC: 13.8 G/DL (ref 11.7–15.7)
HGB BLD-MCNC: 14.4 G/DL (ref 11.7–15.7)
HGB BLD-MCNC: 14.6 G/DL (ref 11.7–15.7)
HGB BLD-MCNC: 14.7 G/DL (ref 11.7–15.7)
HGB BLD-MCNC: 14.9 G/DL (ref 11.7–15.7)
HGB BLD-MCNC: 15 G/DL (ref 11.7–15.7)
HGB UR QL STRIP: ABNORMAL
HGB UR QL STRIP: NEGATIVE
HOLD SPECIMEN: NORMAL
HYALINE CASTS: 9 /LPF
IMM GRANULOCYTES # BLD: 0 10E3/UL
IMM GRANULOCYTES # BLD: 0.1 10E3/UL
IMM GRANULOCYTES NFR BLD: 1 %
INR PPP: 1.04 (ref 0.85–1.15)
INR PPP: 1.07 (ref 0.85–1.15)
INTERPRETATION ECG - MUSE: NORMAL
KETONES UR STRIP-MCNC: 20 MG/DL
KETONES UR STRIP-MCNC: ABNORMAL MG/DL
LACTATE SERPL-SCNC: 2 MMOL/L (ref 0.7–2)
LDLC SERPL CALC-MCNC: 91 MG/DL
LEUKOCYTE ESTERASE UR QL STRIP: ABNORMAL
LEUKOCYTE ESTERASE UR QL STRIP: NEGATIVE
LIPASE SERPL-CCNC: 22 U/L (ref 73–393)
LIPASE SERPL-CCNC: 32 U/L (ref 73–393)
LVEF ECHO: NORMAL
LYMPHOCYTES # BLD AUTO: 0.9 10E3/UL (ref 0.8–5.3)
LYMPHOCYTES # BLD AUTO: 1 10E3/UL (ref 0.8–5.3)
LYMPHOCYTES # BLD AUTO: 1.3 10E3/UL (ref 0.8–5.3)
LYMPHOCYTES # BLD AUTO: 1.7 10E3/UL (ref 0.8–5.3)
LYMPHOCYTES # BLD MANUAL: 2.3 10E3/UL (ref 0.8–5.3)
LYMPHOCYTES NFR BLD AUTO: 10 %
LYMPHOCYTES NFR BLD AUTO: 14 %
LYMPHOCYTES NFR BLD AUTO: 20 %
LYMPHOCYTES NFR BLD AUTO: 20 %
LYMPHOCYTES NFR BLD MANUAL: 29 %
MAGNESIUM SERPL-MCNC: 1.4 MG/DL (ref 1.6–2.3)
MAGNESIUM SERPL-MCNC: 1.5 MG/DL (ref 1.6–2.3)
MAGNESIUM SERPL-MCNC: 1.6 MG/DL (ref 1.6–2.3)
MAGNESIUM SERPL-MCNC: 1.6 MG/DL (ref 1.6–2.3)
MAGNESIUM SERPL-MCNC: 1.7 MG/DL (ref 1.6–2.3)
MAGNESIUM SERPL-MCNC: 1.8 MG/DL (ref 1.6–2.3)
MAGNESIUM SERPL-MCNC: 1.9 MG/DL (ref 1.6–2.3)
MAGNESIUM SERPL-MCNC: 2 MG/DL (ref 1.6–2.3)
MAGNESIUM SERPL-MCNC: 2.1 MG/DL (ref 1.6–2.3)
MAGNESIUM SERPL-MCNC: 2.2 MG/DL (ref 1.6–2.3)
MAGNESIUM SERPL-MCNC: 2.3 MG/DL (ref 1.6–2.3)
MAGNESIUM SERPL-MCNC: 2.3 MG/DL (ref 1.6–2.3)
MAGNESIUM SERPL-MCNC: 2.4 MG/DL (ref 1.6–2.3)
MAGNESIUM SERPL-MCNC: 2.5 MG/DL (ref 1.6–2.3)
MAGNESIUM SERPL-MCNC: 2.7 MG/DL (ref 1.6–2.3)
MCH RBC QN AUTO: 28 PG (ref 26.5–33)
MCH RBC QN AUTO: 28.2 PG (ref 26.5–33)
MCH RBC QN AUTO: 28.2 PG (ref 26.5–33)
MCH RBC QN AUTO: 28.3 PG (ref 26.5–33)
MCH RBC QN AUTO: 28.4 PG (ref 26.5–33)
MCH RBC QN AUTO: 28.5 PG (ref 26.5–33)
MCH RBC QN AUTO: 28.6 PG (ref 26.5–33)
MCH RBC QN AUTO: 28.7 PG (ref 26.5–33)
MCH RBC QN AUTO: 28.7 PG (ref 26.5–33)
MCH RBC QN AUTO: 28.8 PG (ref 26.5–33)
MCH RBC QN AUTO: 29 PG (ref 26.5–33)
MCH RBC QN AUTO: 29 PG (ref 26.5–33)
MCH RBC QN AUTO: 29.1 PG (ref 26.5–33)
MCH RBC QN AUTO: 29.3 PG (ref 26.5–33)
MCH RBC QN AUTO: 29.4 PG (ref 26.5–33)
MCH RBC QN AUTO: 29.6 PG (ref 26.5–33)
MCH RBC QN AUTO: 29.7 PG (ref 26.5–33)
MCHC RBC AUTO-ENTMCNC: 31 G/DL (ref 31.5–36.5)
MCHC RBC AUTO-ENTMCNC: 31.1 G/DL (ref 31.5–36.5)
MCHC RBC AUTO-ENTMCNC: 31.1 G/DL (ref 31.5–36.5)
MCHC RBC AUTO-ENTMCNC: 31.2 G/DL (ref 31.5–36.5)
MCHC RBC AUTO-ENTMCNC: 31.2 G/DL (ref 31.5–36.5)
MCHC RBC AUTO-ENTMCNC: 31.4 G/DL (ref 31.5–36.5)
MCHC RBC AUTO-ENTMCNC: 31.6 G/DL (ref 31.5–36.5)
MCHC RBC AUTO-ENTMCNC: 31.7 G/DL (ref 31.5–36.5)
MCHC RBC AUTO-ENTMCNC: 31.8 G/DL (ref 31.5–36.5)
MCHC RBC AUTO-ENTMCNC: 31.9 G/DL (ref 31.5–36.5)
MCHC RBC AUTO-ENTMCNC: 31.9 G/DL (ref 31.5–36.5)
MCHC RBC AUTO-ENTMCNC: 32.1 G/DL (ref 31.5–36.5)
MCHC RBC AUTO-ENTMCNC: 32.2 G/DL (ref 31.5–36.5)
MCHC RBC AUTO-ENTMCNC: 32.2 G/DL (ref 31.5–36.5)
MCHC RBC AUTO-ENTMCNC: 32.3 G/DL (ref 31.5–36.5)
MCHC RBC AUTO-ENTMCNC: 32.3 G/DL (ref 31.5–36.5)
MCHC RBC AUTO-ENTMCNC: 32.4 G/DL (ref 31.5–36.5)
MCHC RBC AUTO-ENTMCNC: 32.8 G/DL (ref 31.5–36.5)
MCHC RBC AUTO-ENTMCNC: 32.9 G/DL (ref 31.5–36.5)
MCHC RBC AUTO-ENTMCNC: 33.2 G/DL (ref 31.5–36.5)
MCHC RBC AUTO-ENTMCNC: 33.2 G/DL (ref 31.5–36.5)
MCHC RBC AUTO-ENTMCNC: 33.3 G/DL (ref 31.5–36.5)
MCHC RBC AUTO-ENTMCNC: 33.9 G/DL (ref 31.5–36.5)
MCHC RBC AUTO-ENTMCNC: 34.1 G/DL (ref 31.5–36.5)
MCHC RBC AUTO-ENTMCNC: 34.3 G/DL (ref 31.5–36.5)
MCHC RBC AUTO-ENTMCNC: 34.3 G/DL (ref 31.5–36.5)
MCHC RBC AUTO-ENTMCNC: 34.9 G/DL (ref 31.5–36.5)
MCHC RBC AUTO-ENTMCNC: 34.9 G/DL (ref 31.5–36.5)
MCV RBC AUTO: 83 FL (ref 78–100)
MCV RBC AUTO: 85 FL (ref 78–100)
MCV RBC AUTO: 85 FL (ref 78–100)
MCV RBC AUTO: 86 FL (ref 78–100)
MCV RBC AUTO: 87 FL (ref 78–100)
MCV RBC AUTO: 87 FL (ref 78–100)
MCV RBC AUTO: 88 FL (ref 78–100)
MCV RBC AUTO: 89 FL (ref 78–100)
MCV RBC AUTO: 90 FL (ref 78–100)
MCV RBC AUTO: 91 FL (ref 78–100)
MCV RBC AUTO: 92 FL (ref 78–100)
MCV RBC AUTO: 92 FL (ref 78–100)
MCV RBC AUTO: 93 FL (ref 78–100)
MCV RBC AUTO: 93 FL (ref 78–100)
MCV RBC AUTO: 94 FL (ref 78–100)
MCV RBC AUTO: 94 FL (ref 78–100)
MONOCYTES # BLD AUTO: 0.6 10E3/UL (ref 0–1.3)
MONOCYTES # BLD AUTO: 0.6 10E3/UL (ref 0–1.3)
MONOCYTES # BLD AUTO: 0.7 10E3/UL (ref 0–1.3)
MONOCYTES # BLD AUTO: 0.8 10E3/UL (ref 0–1.3)
MONOCYTES # BLD MANUAL: 0.7 10E3/UL (ref 0–1.3)
MONOCYTES NFR BLD AUTO: 10 %
MONOCYTES NFR BLD AUTO: 8 %
MONOCYTES NFR BLD AUTO: 8 %
MONOCYTES NFR BLD AUTO: 9 %
MONOCYTES NFR BLD MANUAL: 9 %
MRSA DNA SPEC QL NAA+PROBE: NEGATIVE
NEUTROPHILS # BLD AUTO: 3.9 10E3/UL (ref 1.6–8.3)
NEUTROPHILS # BLD AUTO: 5 10E3/UL (ref 1.6–8.3)
NEUTROPHILS # BLD AUTO: 5.8 10E3/UL (ref 1.6–8.3)
NEUTROPHILS # BLD AUTO: 6.2 10E3/UL (ref 1.6–8.3)
NEUTROPHILS # BLD MANUAL: 4.7 10E3/UL (ref 1.6–8.3)
NEUTROPHILS NFR BLD AUTO: 59 %
NEUTROPHILS NFR BLD AUTO: 64 %
NEUTROPHILS NFR BLD AUTO: 69 %
NEUTROPHILS NFR BLD AUTO: 74 %
NEUTROPHILS NFR BLD MANUAL: 59 %
NITRATE UR QL: NEGATIVE
NITRATE UR QL: NEGATIVE
NONHDLC SERPL-MCNC: 116 MG/DL
NRBC # BLD AUTO: 0 10E3/UL
NRBC # BLD AUTO: 0.1 10E3/UL
NRBC BLD AUTO-RTO: 0 /100
NRBC BLD MANUAL-RTO: 1 %
O2/TOTAL GAS SETTING VFR VENT: 40 %
O2/TOTAL GAS SETTING VFR VENT: 40 %
OXYHGB MFR BLDV: 52 % (ref 70–75)
P AXIS - MUSE: 44 DEGREES
P AXIS - MUSE: 49 DEGREES
P AXIS - MUSE: 63 DEGREES
PCO2 BLD: 45 MM HG (ref 35–45)
PCO2 BLDV: 44 MM HG (ref 40–50)
PH BLD: 7.39 [PH] (ref 7.35–7.45)
PH BLDV: 7.41 [PH] (ref 7.32–7.43)
PH UR STRIP: 5 [PH] (ref 5–7)
PH UR STRIP: 5 [PH] (ref 5–7)
PHOSPHATE SERPL-MCNC: 1.8 MG/DL (ref 2.5–4.5)
PHOSPHATE SERPL-MCNC: 1.9 MG/DL (ref 2.5–4.5)
PHOSPHATE SERPL-MCNC: 1.9 MG/DL (ref 2.5–4.5)
PHOSPHATE SERPL-MCNC: 2 MG/DL (ref 2.5–4.5)
PHOSPHATE SERPL-MCNC: 2.1 MG/DL (ref 2.5–4.5)
PHOSPHATE SERPL-MCNC: 2.2 MG/DL (ref 2.5–4.5)
PHOSPHATE SERPL-MCNC: 2.3 MG/DL (ref 2.5–4.5)
PHOSPHATE SERPL-MCNC: 2.3 MG/DL (ref 2.5–4.5)
PHOSPHATE SERPL-MCNC: 2.4 MG/DL (ref 2.5–4.5)
PHOSPHATE SERPL-MCNC: 2.5 MG/DL (ref 2.5–4.5)
PHOSPHATE SERPL-MCNC: 2.6 MG/DL (ref 2.5–4.5)
PHOSPHATE SERPL-MCNC: 2.8 MG/DL (ref 2.5–4.5)
PHOSPHATE SERPL-MCNC: 2.9 MG/DL (ref 2.5–4.5)
PHOSPHATE SERPL-MCNC: 2.9 MG/DL (ref 2.5–4.5)
PHOSPHATE SERPL-MCNC: 3.4 MG/DL (ref 2.5–4.5)
PHOSPHATE SERPL-MCNC: 3.4 MG/DL (ref 2.5–4.5)
PHOSPHATE SERPL-MCNC: 5.9 MG/DL (ref 2.5–4.5)
PLAT MORPH BLD: ABNORMAL
PLATELET # BLD AUTO: 197 10E3/UL (ref 150–450)
PLATELET # BLD AUTO: 216 10E3/UL (ref 150–450)
PLATELET # BLD AUTO: 220 10E3/UL (ref 150–450)
PLATELET # BLD AUTO: 223 10E3/UL (ref 150–450)
PLATELET # BLD AUTO: 224 10E3/UL (ref 150–450)
PLATELET # BLD AUTO: 226 10E3/UL (ref 150–450)
PLATELET # BLD AUTO: 233 10E3/UL (ref 150–450)
PLATELET # BLD AUTO: 235 10E3/UL (ref 150–450)
PLATELET # BLD AUTO: 237 10E3/UL (ref 150–450)
PLATELET # BLD AUTO: 238 10E3/UL (ref 150–450)
PLATELET # BLD AUTO: 241 10E3/UL (ref 150–450)
PLATELET # BLD AUTO: 245 10E3/UL (ref 150–450)
PLATELET # BLD AUTO: 248 10E3/UL (ref 150–450)
PLATELET # BLD AUTO: 250 10E3/UL (ref 150–450)
PLATELET # BLD AUTO: 251 10E3/UL (ref 150–450)
PLATELET # BLD AUTO: 252 10E3/UL (ref 150–450)
PLATELET # BLD AUTO: 253 10E3/UL (ref 150–450)
PLATELET # BLD AUTO: 254 10E3/UL (ref 150–450)
PLATELET # BLD AUTO: 255 10E3/UL (ref 150–450)
PLATELET # BLD AUTO: 262 10E3/UL (ref 150–450)
PLATELET # BLD AUTO: 263 10E3/UL (ref 150–450)
PLATELET # BLD AUTO: 263 10E3/UL (ref 150–450)
PLATELET # BLD AUTO: 266 10E3/UL (ref 150–450)
PLATELET # BLD AUTO: 270 10E3/UL (ref 150–450)
PLATELET # BLD AUTO: 270 10E3/UL (ref 150–450)
PLATELET # BLD AUTO: 271 10E3/UL (ref 150–450)
PLATELET # BLD AUTO: 272 10E3/UL (ref 150–450)
PLATELET # BLD AUTO: 272 10E3/UL (ref 150–450)
PLATELET # BLD AUTO: 274 10E3/UL (ref 150–450)
PLATELET # BLD AUTO: 275 10E3/UL (ref 150–450)
PLATELET # BLD AUTO: 276 10E3/UL (ref 150–450)
PLATELET # BLD AUTO: 276 10E3/UL (ref 150–450)
PLATELET # BLD AUTO: 278 10E3/UL (ref 150–450)
PLATELET # BLD AUTO: 279 10E3/UL (ref 150–450)
PLATELET # BLD AUTO: 279 10E3/UL (ref 150–450)
PLATELET # BLD AUTO: 282 10E3/UL (ref 150–450)
PLATELET # BLD AUTO: 285 10E3/UL (ref 150–450)
PLATELET # BLD AUTO: 287 10E3/UL (ref 150–450)
PLATELET # BLD AUTO: 291 10E3/UL (ref 150–450)
PLATELET # BLD AUTO: 294 10E3/UL (ref 150–450)
PLATELET # BLD AUTO: 295 10E3/UL (ref 150–450)
PLATELET # BLD AUTO: 296 10E3/UL (ref 150–450)
PLATELET # BLD AUTO: 300 10E3/UL (ref 150–450)
PLATELET # BLD AUTO: 300 10E3/UL (ref 150–450)
PLATELET # BLD AUTO: 302 10E3/UL (ref 150–450)
PLATELET # BLD AUTO: 304 10E3/UL (ref 150–450)
PLATELET # BLD AUTO: 305 10E3/UL (ref 150–450)
PLATELET # BLD AUTO: 313 10E3/UL (ref 150–450)
PLATELET # BLD AUTO: 314 10E3/UL (ref 150–450)
PLATELET # BLD AUTO: 314 10E3/UL (ref 150–450)
PLATELET # BLD AUTO: 326 10E3/UL (ref 150–450)
PLATELET # BLD AUTO: 328 10E3/UL (ref 150–450)
PLATELET # BLD AUTO: 332 10E3/UL (ref 150–450)
PLATELET # BLD AUTO: 365 10E3/UL (ref 150–450)
PLATELET # BLD AUTO: 366 10E3/UL (ref 150–450)
PLATELET # BLD AUTO: 369 10E3/UL (ref 150–450)
PLATELET # BLD AUTO: 375 10E3/UL (ref 150–450)
PLATELET # BLD AUTO: 380 10E3/UL (ref 150–450)
PLATELET # BLD AUTO: 388 10E3/UL (ref 150–450)
PLATELET # BLD AUTO: 396 10E3/UL (ref 150–450)
PLATELET # BLD AUTO: 408 10E3/UL (ref 150–450)
PLATELET # BLD AUTO: 430 10E3/UL (ref 150–450)
PO2 BLD: 60 MM HG (ref 80–105)
PO2 BLDV: 28 MM HG (ref 25–47)
POTASSIUM BLD-SCNC: 2.8 MMOL/L (ref 3.4–5.3)
POTASSIUM BLD-SCNC: 2.8 MMOL/L (ref 3.4–5.3)
POTASSIUM BLD-SCNC: 2.9 MMOL/L (ref 3.4–5.3)
POTASSIUM BLD-SCNC: 3 MMOL/L (ref 3.4–5.3)
POTASSIUM BLD-SCNC: 3.1 MMOL/L (ref 3.4–5.3)
POTASSIUM BLD-SCNC: 3.2 MMOL/L (ref 3.4–5.3)
POTASSIUM BLD-SCNC: 3.3 MMOL/L (ref 3.4–5.3)
POTASSIUM BLD-SCNC: 3.4 MMOL/L (ref 3.4–5.3)
POTASSIUM BLD-SCNC: 3.5 MMOL/L (ref 3.4–5.3)
POTASSIUM BLD-SCNC: 3.6 MMOL/L (ref 3.4–5.3)
POTASSIUM BLD-SCNC: 3.7 MMOL/L (ref 3.4–5.3)
POTASSIUM BLD-SCNC: 3.8 MMOL/L (ref 3.4–5.3)
POTASSIUM BLD-SCNC: 3.9 MMOL/L (ref 3.4–5.3)
POTASSIUM BLD-SCNC: 4 MMOL/L (ref 3.4–5.3)
POTASSIUM BLD-SCNC: 4.1 MMOL/L (ref 3.4–5.3)
POTASSIUM BLD-SCNC: 4.2 MMOL/L (ref 3.4–5.3)
POTASSIUM BLD-SCNC: 4.3 MMOL/L (ref 3.4–5.3)
POTASSIUM BLD-SCNC: 4.4 MMOL/L (ref 3.4–5.3)
PR INTERVAL - MUSE: 192 MS
PR INTERVAL - MUSE: 200 MS
PR INTERVAL - MUSE: 210 MS
PROCALCITONIN SERPL-MCNC: 0.05 NG/ML
PROCALCITONIN SERPL-MCNC: 0.5 NG/ML
PROCALCITONIN SERPL-MCNC: 1.65 NG/ML
PROCALCITONIN SERPL-MCNC: 20.43 NG/ML
PROCALCITONIN SERPL-MCNC: <0.05 NG/ML
PROT SERPL-MCNC: 3.6 G/DL (ref 6.8–8.8)
QRS DURATION - MUSE: 100 MS
QRS DURATION - MUSE: 82 MS
QRS DURATION - MUSE: 86 MS
QT - MUSE: 412 MS
QT - MUSE: 426 MS
QT - MUSE: 436 MS
QTC - MUSE: 475 MS
QTC - MUSE: 494 MS
QTC - MUSE: 497 MS
R AXIS - MUSE: 11 DEGREES
R AXIS - MUSE: 5 DEGREES
R AXIS - MUSE: 58 DEGREES
RBC # BLD AUTO: 3.61 10E6/UL (ref 3.8–5.2)
RBC # BLD AUTO: 3.85 10E6/UL (ref 3.8–5.2)
RBC # BLD AUTO: 3.87 10E6/UL (ref 3.8–5.2)
RBC # BLD AUTO: 3.92 10E6/UL (ref 3.8–5.2)
RBC # BLD AUTO: 4 10E6/UL (ref 3.8–5.2)
RBC # BLD AUTO: 4.02 10E6/UL (ref 3.8–5.2)
RBC # BLD AUTO: 4.23 10E6/UL (ref 3.8–5.2)
RBC # BLD AUTO: 4.23 10E6/UL (ref 3.8–5.2)
RBC # BLD AUTO: 4.35 10E6/UL (ref 3.8–5.2)
RBC # BLD AUTO: 4.37 10E6/UL (ref 3.8–5.2)
RBC # BLD AUTO: 4.44 10E6/UL (ref 3.8–5.2)
RBC # BLD AUTO: 4.47 10E6/UL (ref 3.8–5.2)
RBC # BLD AUTO: 4.48 10E6/UL (ref 3.8–5.2)
RBC # BLD AUTO: 4.52 10E6/UL (ref 3.8–5.2)
RBC # BLD AUTO: 4.57 10E6/UL (ref 3.8–5.2)
RBC # BLD AUTO: 4.62 10E6/UL (ref 3.8–5.2)
RBC # BLD AUTO: 4.63 10E6/UL (ref 3.8–5.2)
RBC # BLD AUTO: 4.65 10E6/UL (ref 3.8–5.2)
RBC # BLD AUTO: 4.66 10E6/UL (ref 3.8–5.2)
RBC # BLD AUTO: 4.66 10E6/UL (ref 3.8–5.2)
RBC # BLD AUTO: 4.71 10E6/UL (ref 3.8–5.2)
RBC # BLD AUTO: 4.72 10E6/UL (ref 3.8–5.2)
RBC # BLD AUTO: 4.79 10E6/UL (ref 3.8–5.2)
RBC # BLD AUTO: 4.81 10E6/UL (ref 3.8–5.2)
RBC # BLD AUTO: 4.86 10E6/UL (ref 3.8–5.2)
RBC # BLD AUTO: 5.04 10E6/UL (ref 3.8–5.2)
RBC # BLD AUTO: 5.06 10E6/UL (ref 3.8–5.2)
RBC # BLD AUTO: 5.09 10E6/UL (ref 3.8–5.2)
RBC # BLD AUTO: 5.18 10E6/UL (ref 3.8–5.2)
RBC # BLD AUTO: 5.25 10E6/UL (ref 3.8–5.2)
RBC MORPH BLD: ABNORMAL
RBC URINE: 49 /HPF
SA TARGET DNA: POSITIVE
SARS-COV-2 RNA RESP QL NAA+PROBE: NEGATIVE
SARS-COV-2 RNA RESP QL NAA+PROBE: NEGATIVE
SMUDGE CELLS BLD QL SMEAR: PRESENT
SODIUM SERPL-SCNC: 134 MMOL/L (ref 133–144)
SODIUM SERPL-SCNC: 135 MMOL/L (ref 133–144)
SODIUM SERPL-SCNC: 136 MMOL/L (ref 133–144)
SODIUM SERPL-SCNC: 137 MMOL/L (ref 133–144)
SODIUM SERPL-SCNC: 138 MMOL/L (ref 133–144)
SODIUM SERPL-SCNC: 139 MMOL/L (ref 133–144)
SODIUM SERPL-SCNC: 140 MMOL/L (ref 133–144)
SODIUM SERPL-SCNC: 141 MMOL/L (ref 133–144)
SODIUM SERPL-SCNC: 142 MMOL/L (ref 133–144)
SODIUM SERPL-SCNC: 143 MMOL/L (ref 133–144)
SODIUM SERPL-SCNC: 144 MMOL/L (ref 133–144)
SODIUM SERPL-SCNC: 144 MMOL/L (ref 133–144)
SP GR UR STRIP: 1.02 (ref 1–1.03)
SP GR UR STRIP: 1.02 (ref 1–1.03)
SQUAMOUS EPITHELIAL: 15 /HPF
SYSTOLIC BLOOD PRESSURE - MUSE: NORMAL MMHG
T AXIS - MUSE: -16 DEGREES
T AXIS - MUSE: 267 DEGREES
T AXIS - MUSE: 6 DEGREES
TRIGL SERPL-MCNC: 125 MG/DL
TROPONIN I SERPL HS-MCNC: 5 NG/L
UROBILINOGEN UR STRIP-MCNC: 2 MG/DL
UROBILINOGEN UR STRIP-MCNC: NORMAL MG/DL
VANCOMYCIN SERPL-MCNC: 8.9 MG/L
VENTRICULAR RATE- MUSE: 78 BPM
VENTRICULAR RATE- MUSE: 80 BPM
VENTRICULAR RATE- MUSE: 81 BPM
WBC # BLD AUTO: 11.7 10E3/UL (ref 4–11)
WBC # BLD AUTO: 12 10E3/UL (ref 4–11)
WBC # BLD AUTO: 12.2 10E3/UL (ref 4–11)
WBC # BLD AUTO: 12.8 10E3/UL (ref 4–11)
WBC # BLD AUTO: 14.7 10E3/UL (ref 4–11)
WBC # BLD AUTO: 4.9 10E3/UL (ref 4–11)
WBC # BLD AUTO: 4.9 10E3/UL (ref 4–11)
WBC # BLD AUTO: 5.7 10E3/UL (ref 4–11)
WBC # BLD AUTO: 5.7 10E3/UL (ref 4–11)
WBC # BLD AUTO: 5.8 10E3/UL (ref 4–11)
WBC # BLD AUTO: 6 10E3/UL (ref 4–11)
WBC # BLD AUTO: 6.1 10E3/UL (ref 4–11)
WBC # BLD AUTO: 6.4 10E3/UL (ref 4–11)
WBC # BLD AUTO: 6.5 10E3/UL (ref 4–11)
WBC # BLD AUTO: 6.7 10E3/UL (ref 4–11)
WBC # BLD AUTO: 6.8 10E3/UL (ref 4–11)
WBC # BLD AUTO: 7 10E3/UL (ref 4–11)
WBC # BLD AUTO: 7.1 10E3/UL (ref 4–11)
WBC # BLD AUTO: 7.2 10E3/UL (ref 4–11)
WBC # BLD AUTO: 7.4 10E3/UL (ref 4–11)
WBC # BLD AUTO: 7.9 10E3/UL (ref 4–11)
WBC # BLD AUTO: 8.1 10E3/UL (ref 4–11)
WBC # BLD AUTO: 8.2 10E3/UL (ref 4–11)
WBC # BLD AUTO: 8.3 10E3/UL (ref 4–11)
WBC # BLD AUTO: 8.3 10E3/UL (ref 4–11)
WBC # BLD AUTO: 8.7 10E3/UL (ref 4–11)
WBC # BLD AUTO: 9.2 10E3/UL (ref 4–11)
WBC # BLD AUTO: 9.5 10E3/UL (ref 4–11)
WBC CLUMPS #/AREA URNS HPF: PRESENT /HPF
WBC URINE: 135 /HPF

## 2022-01-01 PROCEDURE — 250N000013 HC RX MED GY IP 250 OP 250 PS 637: Performed by: INTERNAL MEDICINE

## 2022-01-01 PROCEDURE — 250N000013 HC RX MED GY IP 250 OP 250 PS 637

## 2022-01-01 PROCEDURE — 71045 X-RAY EXAM CHEST 1 VIEW: CPT

## 2022-01-01 PROCEDURE — 250N000013 HC RX MED GY IP 250 OP 250 PS 637: Performed by: HOSPITALIST

## 2022-01-01 PROCEDURE — 84100 ASSAY OF PHOSPHORUS: CPT | Performed by: INTERNAL MEDICINE

## 2022-01-01 PROCEDURE — 272N000116 HC CATH CR1

## 2022-01-01 PROCEDURE — 250N000013 HC RX MED GY IP 250 OP 250 PS 637: Performed by: NURSE PRACTITIONER

## 2022-01-01 PROCEDURE — 83735 ASSAY OF MAGNESIUM: CPT | Performed by: INTERNAL MEDICINE

## 2022-01-01 PROCEDURE — 94002 VENT MGMT INPAT INIT DAY: CPT

## 2022-01-01 PROCEDURE — 250N000011 HC RX IP 250 OP 636

## 2022-01-01 PROCEDURE — 84132 ASSAY OF SERUM POTASSIUM: CPT | Performed by: INTERNAL MEDICINE

## 2022-01-01 PROCEDURE — 250N000013 HC RX MED GY IP 250 OP 250 PS 637: Performed by: PSYCHIATRY & NEUROLOGY

## 2022-01-01 PROCEDURE — 99233 SBSQ HOSP IP/OBS HIGH 50: CPT | Mod: FS | Performed by: PSYCHIATRY & NEUROLOGY

## 2022-01-01 PROCEDURE — 99232 SBSQ HOSP IP/OBS MODERATE 35: CPT | Performed by: HOSPITALIST

## 2022-01-01 PROCEDURE — 250N000011 HC RX IP 250 OP 636: Performed by: HOSPITALIST

## 2022-01-01 PROCEDURE — 258N000003 HC RX IP 258 OP 636: Performed by: INTERNAL MEDICINE

## 2022-01-01 PROCEDURE — 96374 THER/PROPH/DIAG INJ IV PUSH: CPT | Mod: 59

## 2022-01-01 PROCEDURE — 87075 CULTR BACTERIA EXCEPT BLOOD: CPT | Performed by: SURGERY

## 2022-01-01 PROCEDURE — 999N000128 HC STATISTIC PERIPHERAL IV START W/O US GUIDANCE

## 2022-01-01 PROCEDURE — 99291 CRITICAL CARE FIRST HOUR: CPT | Performed by: INTERNAL MEDICINE

## 2022-01-01 PROCEDURE — 99232 SBSQ HOSP IP/OBS MODERATE 35: CPT | Performed by: INTERNAL MEDICINE

## 2022-01-01 PROCEDURE — 258N000003 HC RX IP 258 OP 636: Performed by: STUDENT IN AN ORGANIZED HEALTH CARE EDUCATION/TRAINING PROGRAM

## 2022-01-01 PROCEDURE — 120N000001 HC R&B MED SURG/OB

## 2022-01-01 PROCEDURE — 84132 ASSAY OF SERUM POTASSIUM: CPT | Performed by: HOSPITALIST

## 2022-01-01 PROCEDURE — G0463 HOSPITAL OUTPT CLINIC VISIT: HCPCS

## 2022-01-01 PROCEDURE — 36415 COLL VENOUS BLD VENIPUNCTURE: CPT | Performed by: INTERNAL MEDICINE

## 2022-01-01 PROCEDURE — 99207 PR NO BILLABLE SERVICE THIS VISIT: CPT | Performed by: PSYCHIATRY & NEUROLOGY

## 2022-01-01 PROCEDURE — 97530 THERAPEUTIC ACTIVITIES: CPT | Mod: GP

## 2022-01-01 PROCEDURE — 83735 ASSAY OF MAGNESIUM: CPT | Performed by: HOSPITALIST

## 2022-01-01 PROCEDURE — 87070 CULTURE OTHR SPECIMN AEROBIC: CPT | Performed by: SURGERY

## 2022-01-01 PROCEDURE — 85049 AUTOMATED PLATELET COUNT: CPT | Performed by: STUDENT IN AN ORGANIZED HEALTH CARE EDUCATION/TRAINING PROGRAM

## 2022-01-01 PROCEDURE — 99233 SBSQ HOSP IP/OBS HIGH 50: CPT | Performed by: INTERNAL MEDICINE

## 2022-01-01 PROCEDURE — 258N000003 HC RX IP 258 OP 636: Performed by: EMERGENCY MEDICINE

## 2022-01-01 PROCEDURE — F08H5YZ WOUND MANAGEMENT TREATMENT OF INTEGUMENTARY SYSTEM - WHOLE BODY USING OTHER EQUIPMENT: ICD-10-PCS | Performed by: HOSPITALIST

## 2022-01-01 PROCEDURE — 250N000013 HC RX MED GY IP 250 OP 250 PS 637: Performed by: STUDENT IN AN ORGANIZED HEALTH CARE EDUCATION/TRAINING PROGRAM

## 2022-01-01 PROCEDURE — 97530 THERAPEUTIC ACTIVITIES: CPT | Mod: GP | Performed by: PHYSICAL THERAPIST

## 2022-01-01 PROCEDURE — 99231 SBSQ HOSP IP/OBS SF/LOW 25: CPT | Performed by: INTERNAL MEDICINE

## 2022-01-01 PROCEDURE — C1887 CATHETER, GUIDING: HCPCS

## 2022-01-01 PROCEDURE — 83690 ASSAY OF LIPASE: CPT | Performed by: SPECIALIST

## 2022-01-01 PROCEDURE — U0003 INFECTIOUS AGENT DETECTION BY NUCLEIC ACID (DNA OR RNA); SEVERE ACUTE RESPIRATORY SYNDROME CORONAVIRUS 2 (SARS-COV-2) (CORONAVIRUS DISEASE [COVID-19]), AMPLIFIED PROBE TECHNIQUE, MAKING USE OF HIGH THROUGHPUT TECHNOLOGIES AS DESCRIBED BY CMS-2020-01-R: HCPCS | Performed by: EMERGENCY MEDICINE

## 2022-01-01 PROCEDURE — 97110 THERAPEUTIC EXERCISES: CPT | Mod: GP

## 2022-01-01 PROCEDURE — 93005 ELECTROCARDIOGRAM TRACING: CPT

## 2022-01-01 PROCEDURE — 250N000013 HC RX MED GY IP 250 OP 250 PS 637: Performed by: SPECIALIST

## 2022-01-01 PROCEDURE — G0463 HOSPITAL OUTPT CLINIC VISIT: HCPCS | Mod: 25

## 2022-01-01 PROCEDURE — 03CG3ZZ EXTIRPATION OF MATTER FROM INTRACRANIAL ARTERY, PERCUTANEOUS APPROACH: ICD-10-PCS | Performed by: NEUROLOGICAL SURGERY

## 2022-01-01 PROCEDURE — 82310 ASSAY OF CALCIUM: CPT | Performed by: INTERNAL MEDICINE

## 2022-01-01 PROCEDURE — 99233 SBSQ HOSP IP/OBS HIGH 50: CPT | Performed by: HOSPITALIST

## 2022-01-01 PROCEDURE — 250N000011 HC RX IP 250 OP 636: Performed by: INTERNAL MEDICINE

## 2022-01-01 PROCEDURE — 258N000003 HC RX IP 258 OP 636: Performed by: HOSPITALIST

## 2022-01-01 PROCEDURE — 250N000011 HC RX IP 250 OP 636: Performed by: STUDENT IN AN ORGANIZED HEALTH CARE EDUCATION/TRAINING PROGRAM

## 2022-01-01 PROCEDURE — 92526 ORAL FUNCTION THERAPY: CPT | Mod: GN

## 2022-01-01 PROCEDURE — 99441 PR PHYSICIAN TELEPHONE EVALUATION 5-10 MIN: CPT | Mod: 95 | Performed by: FAMILY MEDICINE

## 2022-01-01 PROCEDURE — 99231 SBSQ HOSP IP/OBS SF/LOW 25: CPT | Performed by: HOSPITALIST

## 2022-01-01 PROCEDURE — 250N000011 HC RX IP 250 OP 636: Performed by: PHYSICIAN ASSISTANT

## 2022-01-01 PROCEDURE — 250N000011 HC RX IP 250 OP 636: Performed by: SPECIALIST

## 2022-01-01 PROCEDURE — 85049 AUTOMATED PLATELET COUNT: CPT | Performed by: INTERNAL MEDICINE

## 2022-01-01 PROCEDURE — 82310 ASSAY OF CALCIUM: CPT | Performed by: HOSPITALIST

## 2022-01-01 PROCEDURE — 76937 US GUIDE VASCULAR ACCESS: CPT

## 2022-01-01 PROCEDURE — 36415 COLL VENOUS BLD VENIPUNCTURE: CPT | Performed by: HOSPITALIST

## 2022-01-01 PROCEDURE — 99233 SBSQ HOSP IP/OBS HIGH 50: CPT | Performed by: SPECIALIST

## 2022-01-01 PROCEDURE — 80048 BASIC METABOLIC PNL TOTAL CA: CPT | Performed by: INTERNAL MEDICINE

## 2022-01-01 PROCEDURE — 999N000065 XR CHEST PORT 1 VIEW

## 2022-01-01 PROCEDURE — 84100 ASSAY OF PHOSPHORUS: CPT | Performed by: HOSPITALIST

## 2022-01-01 PROCEDURE — 97140 MANUAL THERAPY 1/> REGIONS: CPT | Mod: GP

## 2022-01-01 PROCEDURE — 250N000009 HC RX 250: Performed by: STUDENT IN AN ORGANIZED HEALTH CARE EDUCATION/TRAINING PROGRAM

## 2022-01-01 PROCEDURE — 82565 ASSAY OF CREATININE: CPT

## 2022-01-01 PROCEDURE — 36415 COLL VENOUS BLD VENIPUNCTURE: CPT | Performed by: STUDENT IN AN ORGANIZED HEALTH CARE EDUCATION/TRAINING PROGRAM

## 2022-01-01 PROCEDURE — 97606 NEG PRS WND THER DME>50 SQCM: CPT

## 2022-01-01 PROCEDURE — 99231 SBSQ HOSP IP/OBS SF/LOW 25: CPT | Performed by: STUDENT IN AN ORGANIZED HEALTH CARE EDUCATION/TRAINING PROGRAM

## 2022-01-01 PROCEDURE — 85049 AUTOMATED PLATELET COUNT: CPT

## 2022-01-01 PROCEDURE — 999N000147 HC STATISTIC PT IP EVAL DEFER: Performed by: PHYSICAL THERAPIST

## 2022-01-01 PROCEDURE — 250N000011 HC RX IP 250 OP 636: Performed by: ANESTHESIOLOGY

## 2022-01-01 PROCEDURE — 70496 CT ANGIOGRAPHY HEAD: CPT

## 2022-01-01 PROCEDURE — 80048 BASIC METABOLIC PNL TOTAL CA: CPT | Performed by: HOSPITALIST

## 2022-01-01 PROCEDURE — 99223 1ST HOSP IP/OBS HIGH 75: CPT | Mod: AI | Performed by: INTERNAL MEDICINE

## 2022-01-01 PROCEDURE — 97530 THERAPEUTIC ACTIVITIES: CPT | Mod: GO

## 2022-01-01 PROCEDURE — 73552 X-RAY EXAM OF FEMUR 2/>: CPT | Mod: LT

## 2022-01-01 PROCEDURE — XW013W7 INTRODUCTION OF COVID-19 VACCINE BOOSTER INTO SUBCUTANEOUS TISSUE, PERCUTANEOUS APPROACH, NEW TECHNOLOGY GROUP 7: ICD-10-PCS | Performed by: INTERNAL MEDICINE

## 2022-01-01 PROCEDURE — 85014 HEMATOCRIT: CPT | Performed by: INTERNAL MEDICINE

## 2022-01-01 PROCEDURE — 82330 ASSAY OF CALCIUM: CPT | Performed by: INTERNAL MEDICINE

## 2022-01-01 PROCEDURE — 258N000003 HC RX IP 258 OP 636: Performed by: ANESTHESIOLOGY

## 2022-01-01 PROCEDURE — 97140 MANUAL THERAPY 1/> REGIONS: CPT | Mod: GP | Performed by: PHYSICAL THERAPIST

## 2022-01-01 PROCEDURE — 83735 ASSAY OF MAGNESIUM: CPT | Performed by: STUDENT IN AN ORGANIZED HEALTH CARE EDUCATION/TRAINING PROGRAM

## 2022-01-01 PROCEDURE — 84100 ASSAY OF PHOSPHORUS: CPT | Performed by: STUDENT IN AN ORGANIZED HEALTH CARE EDUCATION/TRAINING PROGRAM

## 2022-01-01 PROCEDURE — 250N000011 HC RX IP 250 OP 636: Performed by: SURGERY

## 2022-01-01 PROCEDURE — 85730 THROMBOPLASTIN TIME PARTIAL: CPT | Performed by: INTERNAL MEDICINE

## 2022-01-01 PROCEDURE — 85014 HEMATOCRIT: CPT | Performed by: HOSPITALIST

## 2022-01-01 PROCEDURE — 94003 VENT MGMT INPAT SUBQ DAY: CPT

## 2022-01-01 PROCEDURE — 85018 HEMOGLOBIN: CPT | Performed by: HOSPITALIST

## 2022-01-01 PROCEDURE — 86140 C-REACTIVE PROTEIN: CPT | Performed by: INTERNAL MEDICINE

## 2022-01-01 PROCEDURE — 72170 X-RAY EXAM OF PELVIS: CPT

## 2022-01-01 PROCEDURE — 999N000127 HC STATISTIC PERIPHERAL IV START W US GUIDANCE

## 2022-01-01 PROCEDURE — 85027 COMPLETE CBC AUTOMATED: CPT | Performed by: INTERNAL MEDICINE

## 2022-01-01 PROCEDURE — 250N000009 HC RX 250: Performed by: HOSPITALIST

## 2022-01-01 PROCEDURE — 200N000001 HC R&B ICU

## 2022-01-01 PROCEDURE — 36415 COLL VENOUS BLD VENIPUNCTURE: CPT

## 2022-01-01 PROCEDURE — 250N000011 HC RX IP 250 OP 636: Performed by: EMERGENCY MEDICINE

## 2022-01-01 PROCEDURE — 83036 HEMOGLOBIN GLYCOSYLATED A1C: CPT | Performed by: INTERNAL MEDICINE

## 2022-01-01 PROCEDURE — 93010 ELECTROCARDIOGRAM REPORT: CPT | Performed by: INTERNAL MEDICINE

## 2022-01-01 PROCEDURE — 97605 NEG PRS WND THER DME<=50SQCM: CPT

## 2022-01-01 PROCEDURE — 250N000009 HC RX 250: Performed by: INTERNAL MEDICINE

## 2022-01-01 PROCEDURE — 97535 SELF CARE MNGMENT TRAINING: CPT | Mod: GO | Performed by: OCCUPATIONAL THERAPIST

## 2022-01-01 PROCEDURE — 250N000009 HC RX 250

## 2022-01-01 PROCEDURE — 97602 WOUND(S) CARE NON-SELECTIVE: CPT

## 2022-01-01 PROCEDURE — 85610 PROTHROMBIN TIME: CPT | Performed by: INTERNAL MEDICINE

## 2022-01-01 PROCEDURE — C1769 GUIDE WIRE: HCPCS

## 2022-01-01 PROCEDURE — C1757 CATH, THROMBECTOMY/EMBOLECT: HCPCS

## 2022-01-01 PROCEDURE — 0052A HC ADMIN COVID VAC PFIZER TRS-SUCR, 2ND DOSE: CPT | Performed by: HOSPITALIST

## 2022-01-01 PROCEDURE — 92526 ORAL FUNCTION THERAPY: CPT | Mod: GN | Performed by: REHABILITATION PRACTITIONER

## 2022-01-01 PROCEDURE — 258N000001 HC RX 258: Performed by: HOSPITALIST

## 2022-01-01 PROCEDURE — 84132 ASSAY OF SERUM POTASSIUM: CPT | Performed by: STUDENT IN AN ORGANIZED HEALTH CARE EDUCATION/TRAINING PROGRAM

## 2022-01-01 PROCEDURE — 82374 ASSAY BLOOD CARBON DIOXIDE: CPT | Performed by: INTERNAL MEDICINE

## 2022-01-01 PROCEDURE — 99232 SBSQ HOSP IP/OBS MODERATE 35: CPT | Performed by: SPECIALIST

## 2022-01-01 PROCEDURE — 250N000011 HC RX IP 250 OP 636: Performed by: PSYCHIATRY & NEUROLOGY

## 2022-01-01 PROCEDURE — F08H5YZ WOUND MANAGEMENT TREATMENT OF INTEGUMENTARY SYSTEM - WHOLE BODY USING OTHER EQUIPMENT: ICD-10-PCS | Performed by: INTERNAL MEDICINE

## 2022-01-01 PROCEDURE — 85027 COMPLETE CBC AUTOMATED: CPT | Performed by: HOSPITALIST

## 2022-01-01 PROCEDURE — C9803 HOPD COVID-19 SPEC COLLECT: HCPCS

## 2022-01-01 PROCEDURE — 80069 RENAL FUNCTION PANEL: CPT | Performed by: INTERNAL MEDICINE

## 2022-01-01 PROCEDURE — 3E043XZ INTRODUCTION OF VASOPRESSOR INTO CENTRAL VEIN, PERCUTANEOUS APPROACH: ICD-10-PCS | Performed by: INTERNAL MEDICINE

## 2022-01-01 PROCEDURE — 99292 CRITICAL CARE ADDL 30 MIN: CPT | Performed by: PHYSICIAN ASSISTANT

## 2022-01-01 PROCEDURE — 82565 ASSAY OF CREATININE: CPT | Performed by: INTERNAL MEDICINE

## 2022-01-01 PROCEDURE — 255N000002 HC RX 255 OP 636

## 2022-01-01 PROCEDURE — 84132 ASSAY OF SERUM POTASSIUM: CPT | Performed by: SPECIALIST

## 2022-01-01 PROCEDURE — 87070 CULTURE OTHR SPECIMN AEROBIC: CPT | Performed by: HOSPITALIST

## 2022-01-01 PROCEDURE — 2894A VOIDCORRECT: CPT | Mod: 26 | Performed by: NEUROLOGICAL SURGERY

## 2022-01-01 PROCEDURE — 99207 PR NO BILLABLE SERVICE THIS VISIT: CPT | Performed by: PHYSICIAN ASSISTANT

## 2022-01-01 PROCEDURE — 99222 1ST HOSP IP/OBS MODERATE 55: CPT | Mod: FS | Performed by: SURGERY

## 2022-01-01 PROCEDURE — 99232 SBSQ HOSP IP/OBS MODERATE 35: CPT | Mod: FS | Performed by: PSYCHIATRY & NEUROLOGY

## 2022-01-01 PROCEDURE — 99285 EMERGENCY DEPT VISIT HI MDM: CPT | Mod: 25

## 2022-01-01 PROCEDURE — 76705 ECHO EXAM OF ABDOMEN: CPT

## 2022-01-01 PROCEDURE — 83735 ASSAY OF MAGNESIUM: CPT | Performed by: SURGERY

## 2022-01-01 PROCEDURE — 82533 TOTAL CORTISOL: CPT | Performed by: SPECIALIST

## 2022-01-01 PROCEDURE — 999N000009 HC STATISTIC AIRWAY CARE

## 2022-01-01 PROCEDURE — 999N000141 HC STATISTIC PRE-PROCEDURE NURSING ASSESSMENT: Performed by: SURGERY

## 2022-01-01 PROCEDURE — 83690 ASSAY OF LIPASE: CPT | Performed by: SURGERY

## 2022-01-01 PROCEDURE — 250N000009 HC RX 250: Performed by: SURGERY

## 2022-01-01 PROCEDURE — 85025 COMPLETE CBC W/AUTO DIFF WBC: CPT | Performed by: EMERGENCY MEDICINE

## 2022-01-01 PROCEDURE — 710N000009 HC RECOVERY PHASE 1, LEVEL 1, PER MIN: Performed by: SURGERY

## 2022-01-01 PROCEDURE — 5A1935Z RESPIRATORY VENTILATION, LESS THAN 24 CONSECUTIVE HOURS: ICD-10-PCS | Performed by: INTERNAL MEDICINE

## 2022-01-01 PROCEDURE — 83605 ASSAY OF LACTIC ACID: CPT | Performed by: INTERNAL MEDICINE

## 2022-01-01 PROCEDURE — 96375 TX/PRO/DX INJ NEW DRUG ADDON: CPT | Mod: 59

## 2022-01-01 PROCEDURE — 250N000013 HC RX MED GY IP 250 OP 250 PS 637: Performed by: SURGERY

## 2022-01-01 PROCEDURE — 97161 PT EVAL LOW COMPLEX 20 MIN: CPT | Mod: GP

## 2022-01-01 PROCEDURE — 85027 COMPLETE CBC AUTOMATED: CPT | Performed by: STUDENT IN AN ORGANIZED HEALTH CARE EDUCATION/TRAINING PROGRAM

## 2022-01-01 PROCEDURE — 99223 1ST HOSP IP/OBS HIGH 75: CPT | Performed by: NURSE PRACTITIONER

## 2022-01-01 PROCEDURE — 99232 SBSQ HOSP IP/OBS MODERATE 35: CPT | Performed by: STUDENT IN AN ORGANIZED HEALTH CARE EDUCATION/TRAINING PROGRAM

## 2022-01-01 PROCEDURE — 70450 CT HEAD/BRAIN W/O DYE: CPT

## 2022-01-01 PROCEDURE — 999N000157 HC STATISTIC RCP TIME EA 10 MIN

## 2022-01-01 PROCEDURE — 61645 PERQ ART M-THROMBECT &/NFS: CPT

## 2022-01-01 PROCEDURE — 91305 HC RX IP 250 OP 636: CPT | Performed by: HOSPITALIST

## 2022-01-01 PROCEDURE — 82947 ASSAY GLUCOSE BLOOD QUANT: CPT | Performed by: HOSPITALIST

## 2022-01-01 PROCEDURE — 999N000040 HC STATISTIC CONSULT NO CHARGE VASC ACCESS

## 2022-01-01 PROCEDURE — 85730 THROMBOPLASTIN TIME PARTIAL: CPT | Performed by: EMERGENCY MEDICINE

## 2022-01-01 PROCEDURE — 99291 CRITICAL CARE FIRST HOUR: CPT | Mod: FS | Performed by: PSYCHIATRY & NEUROLOGY

## 2022-01-01 PROCEDURE — 87205 SMEAR GRAM STAIN: CPT | Performed by: SURGERY

## 2022-01-01 PROCEDURE — 92526 ORAL FUNCTION THERAPY: CPT | Mod: GN | Performed by: SPEECH-LANGUAGE PATHOLOGIST

## 2022-01-01 PROCEDURE — 82374 ASSAY BLOOD CARBON DIOXIDE: CPT | Performed by: HOSPITALIST

## 2022-01-01 PROCEDURE — 99233 SBSQ HOSP IP/OBS HIGH 50: CPT | Mod: FS | Performed by: NURSE PRACTITIONER

## 2022-01-01 PROCEDURE — 999N000197 HC STATISTIC WOC PT EDUCATION, 0-15 MIN

## 2022-01-01 PROCEDURE — 99222 1ST HOSP IP/OBS MODERATE 55: CPT | Performed by: SPECIALIST

## 2022-01-01 PROCEDURE — 82565 ASSAY OF CREATININE: CPT | Performed by: HOSPITALIST

## 2022-01-01 PROCEDURE — 99232 SBSQ HOSP IP/OBS MODERATE 35: CPT | Mod: 25

## 2022-01-01 PROCEDURE — 92610 EVALUATE SWALLOWING FUNCTION: CPT | Mod: GN | Performed by: SPEECH-LANGUAGE PATHOLOGIST

## 2022-01-01 PROCEDURE — 99442 PR PHYSICIAN TELEPHONE EVALUATION 11-20 MIN: CPT | Mod: 95 | Performed by: PHYSICIAN ASSISTANT

## 2022-01-01 PROCEDURE — 84145 PROCALCITONIN (PCT): CPT | Performed by: HOSPITALIST

## 2022-01-01 PROCEDURE — 272N000192 HC ACCESSORY CR2

## 2022-01-01 PROCEDURE — 80202 ASSAY OF VANCOMYCIN: CPT

## 2022-01-01 PROCEDURE — 272N000452 HC KIT SHRLOCK 5FR POWER PICC TRIPLE LUMEN

## 2022-01-01 PROCEDURE — 96376 TX/PRO/DX INJ SAME DRUG ADON: CPT | Mod: 59

## 2022-01-01 PROCEDURE — 272N000280 HC DEVICE COMPRESSION CR5

## 2022-01-01 PROCEDURE — 97166 OT EVAL MOD COMPLEX 45 MIN: CPT | Mod: GO

## 2022-01-01 PROCEDURE — 82310 ASSAY OF CALCIUM: CPT | Performed by: EMERGENCY MEDICINE

## 2022-01-01 PROCEDURE — 11043 DBRDMT MUSC&/FSCA 1ST 20/<: CPT | Performed by: SURGERY

## 2022-01-01 PROCEDURE — 250N000009 HC RX 250: Performed by: EMERGENCY MEDICINE

## 2022-01-01 PROCEDURE — 272N000571 HC SHEATH CR8

## 2022-01-01 PROCEDURE — 82805 BLOOD GASES W/O2 SATURATION: CPT | Performed by: INTERNAL MEDICINE

## 2022-01-01 PROCEDURE — 11046 DBRDMT MUSC&/FSCA EA ADDL: CPT | Performed by: SURGERY

## 2022-01-01 PROCEDURE — 84145 PROCALCITONIN (PCT): CPT | Performed by: INTERNAL MEDICINE

## 2022-01-01 PROCEDURE — 97161 PT EVAL LOW COMPLEX 20 MIN: CPT | Mod: GP | Performed by: PHYSICAL THERAPIST

## 2022-01-01 PROCEDURE — 81003 URINALYSIS AUTO W/O SCOPE: CPT

## 2022-01-01 PROCEDURE — 31500 INSERT EMERGENCY AIRWAY: CPT

## 2022-01-01 PROCEDURE — 370N000017 HC ANESTHESIA TECHNICAL FEE, PER MIN: Performed by: SURGERY

## 2022-01-01 PROCEDURE — 258N000001 HC RX 258: Performed by: INTERNAL MEDICINE

## 2022-01-01 PROCEDURE — 360N000077 HC SURGERY LEVEL 4, PER MIN: Performed by: SURGERY

## 2022-01-01 PROCEDURE — 85025 COMPLETE CBC W/AUTO DIFF WBC: CPT | Performed by: HOSPITALIST

## 2022-01-01 PROCEDURE — 272N000570 HC SHEATH CR7

## 2022-01-01 PROCEDURE — 99239 HOSP IP/OBS DSCHRG MGMT >30: CPT | Performed by: STUDENT IN AN ORGANIZED HEALTH CARE EDUCATION/TRAINING PROGRAM

## 2022-01-01 PROCEDURE — 97110 THERAPEUTIC EXERCISES: CPT | Mod: GP | Performed by: PHYSICAL THERAPIST

## 2022-01-01 PROCEDURE — 87641 MR-STAPH DNA AMP PROBE: CPT | Performed by: HOSPITALIST

## 2022-01-01 PROCEDURE — 99233 SBSQ HOSP IP/OBS HIGH 50: CPT | Performed by: PSYCHIATRY & NEUROLOGY

## 2022-01-01 PROCEDURE — 36416 COLLJ CAPILLARY BLOOD SPEC: CPT | Performed by: HOSPITALIST

## 2022-01-01 PROCEDURE — 0JB70ZZ EXCISION OF BACK SUBCUTANEOUS TISSUE AND FASCIA, OPEN APPROACH: ICD-10-PCS | Performed by: SURGERY

## 2022-01-01 PROCEDURE — 99232 SBSQ HOSP IP/OBS MODERATE 35: CPT | Performed by: PHYSICIAN ASSISTANT

## 2022-01-01 PROCEDURE — 999N000190 HC STATISTIC VAT ROUNDS

## 2022-01-01 PROCEDURE — 250N000009 HC RX 250: Performed by: NURSE ANESTHETIST, CERTIFIED REGISTERED

## 2022-01-01 PROCEDURE — 272N000001 HC OR GENERAL SUPPLY STERILE: Performed by: SURGERY

## 2022-01-01 PROCEDURE — 85018 HEMOGLOBIN: CPT | Performed by: INTERNAL MEDICINE

## 2022-01-01 PROCEDURE — 0051A HC ADMIN COVID VAC PFIZER TRS-SUCR, 1ST DOSE: CPT | Performed by: HOSPITALIST

## 2022-01-01 PROCEDURE — 250N000011 HC RX IP 250 OP 636: Performed by: NURSE ANESTHETIST, CERTIFIED REGISTERED

## 2022-01-01 PROCEDURE — 76856 US EXAM PELVIC COMPLETE: CPT

## 2022-01-01 PROCEDURE — U0005 INFEC AGEN DETEC AMPLI PROBE: HCPCS | Performed by: ANESTHESIOLOGY

## 2022-01-01 PROCEDURE — 74176 CT ABD & PELVIS W/O CONTRAST: CPT

## 2022-01-01 PROCEDURE — 85004 AUTOMATED DIFF WBC COUNT: CPT | Performed by: INTERNAL MEDICINE

## 2022-01-01 PROCEDURE — 73700 CT LOWER EXTREMITY W/O DYE: CPT | Mod: 50

## 2022-01-01 PROCEDURE — 97110 THERAPEUTIC EXERCISES: CPT | Mod: GO

## 2022-01-01 PROCEDURE — 99207 PR APP CREDIT; MD BILLING SHARED VISIT: CPT

## 2022-01-01 PROCEDURE — 97530 THERAPEUTIC ACTIVITIES: CPT | Mod: GO | Performed by: OCCUPATIONAL THERAPIST

## 2022-01-01 PROCEDURE — 85610 PROTHROMBIN TIME: CPT | Performed by: EMERGENCY MEDICINE

## 2022-01-01 PROCEDURE — 36600 WITHDRAWAL OF ARTERIAL BLOOD: CPT

## 2022-01-01 PROCEDURE — 99223 1ST HOSP IP/OBS HIGH 75: CPT | Performed by: SPECIALIST

## 2022-01-01 PROCEDURE — 80061 LIPID PANEL: CPT | Performed by: INTERNAL MEDICINE

## 2022-01-01 PROCEDURE — 84484 ASSAY OF TROPONIN QUANT: CPT | Performed by: EMERGENCY MEDICINE

## 2022-01-01 PROCEDURE — 70450 CT HEAD/BRAIN W/O DYE: CPT | Mod: XE

## 2022-01-01 PROCEDURE — 81003 URINALYSIS AUTO W/O SCOPE: CPT | Performed by: HOSPITALIST

## 2022-01-01 PROCEDURE — 82803 BLOOD GASES ANY COMBINATION: CPT | Performed by: INTERNAL MEDICINE

## 2022-01-01 PROCEDURE — 82150 ASSAY OF AMYLASE: CPT | Performed by: SPECIALIST

## 2022-01-01 PROCEDURE — 272N000613 HC CATH NEURO CR21

## 2022-01-01 PROCEDURE — 61645 PERQ ART M-THROMBECT &/NFS: CPT | Mod: RT | Performed by: NEUROLOGICAL SURGERY

## 2022-01-01 PROCEDURE — 93306 TTE W/DOPPLER COMPLETE: CPT | Mod: 26 | Performed by: INTERNAL MEDICINE

## 2022-01-01 PROCEDURE — 74019 RADEX ABDOMEN 2 VIEWS: CPT

## 2022-01-01 PROCEDURE — 36569 INSJ PICC 5 YR+ W/O IMAGING: CPT

## 2022-01-01 PROCEDURE — 255N000002 HC RX 255 OP 636: Performed by: NEUROLOGICAL SURGERY

## 2022-01-01 PROCEDURE — 999N000208 ECHOCARDIOGRAM COMPLETE

## 2022-01-01 PROCEDURE — 87077 CULTURE AEROBIC IDENTIFY: CPT | Performed by: HOSPITALIST

## 2022-01-01 PROCEDURE — 80053 COMPREHEN METABOLIC PANEL: CPT | Performed by: INTERNAL MEDICINE

## 2022-01-01 PROCEDURE — 87086 URINE CULTURE/COLONY COUNT: CPT

## 2022-01-01 PROCEDURE — C9113 INJ PANTOPRAZOLE SODIUM, VIA: HCPCS | Performed by: INTERNAL MEDICINE

## 2022-01-01 PROCEDURE — 82533 TOTAL CORTISOL: CPT | Performed by: INTERNAL MEDICINE

## 2022-01-01 PROCEDURE — 85025 COMPLETE CBC W/AUTO DIFF WBC: CPT | Performed by: INTERNAL MEDICINE

## 2022-01-01 PROCEDURE — 87040 BLOOD CULTURE FOR BACTERIA: CPT | Performed by: INTERNAL MEDICINE

## 2022-01-01 PROCEDURE — 74018 RADEX ABDOMEN 1 VIEW: CPT

## 2022-01-01 PROCEDURE — 85007 BL SMEAR W/DIFF WBC COUNT: CPT | Performed by: INTERNAL MEDICINE

## 2022-01-01 PROCEDURE — 36415 COLL VENOUS BLD VENIPUNCTURE: CPT | Performed by: EMERGENCY MEDICINE

## 2022-01-01 PROCEDURE — 258N000003 HC RX IP 258 OP 636

## 2022-01-01 PROCEDURE — 97535 SELF CARE MNGMENT TRAINING: CPT | Mod: GO

## 2022-01-01 RX ORDER — ACETAMINOPHEN 325 MG/1
975 TABLET ORAL EVERY 8 HOURS
Status: DISCONTINUED | OUTPATIENT
Start: 2022-01-01 | End: 2022-01-01

## 2022-01-01 RX ORDER — HYDROXYZINE HYDROCHLORIDE 25 MG/1
50 TABLET, FILM COATED ORAL EVERY 6 HOURS PRN
Status: DISCONTINUED | OUTPATIENT
Start: 2022-01-01 | End: 2022-01-01

## 2022-01-01 RX ORDER — MULTIPLE VITAMINS W/ MINERALS TAB 9MG-400MCG
1 TAB ORAL DAILY
Status: DISCONTINUED | OUTPATIENT
Start: 2022-01-01 | End: 2022-01-01

## 2022-01-01 RX ORDER — POTASSIUM CHLORIDE 750 MG/1
20 CAPSULE, EXTENDED RELEASE ORAL 3 TIMES DAILY
Status: DISCONTINUED | OUTPATIENT
Start: 2022-01-01 | End: 2022-01-01 | Stop reason: CLARIF

## 2022-01-01 RX ORDER — ETOMIDATE 2 MG/ML
0.3 INJECTION INTRAVENOUS ONCE
Status: DISCONTINUED | OUTPATIENT
Start: 2022-01-01 | End: 2022-01-01

## 2022-01-01 RX ORDER — MIDODRINE HYDROCHLORIDE 2.5 MG/1
2.5 TABLET ORAL
Status: DISCONTINUED | OUTPATIENT
Start: 2022-01-01 | End: 2022-01-01

## 2022-01-01 RX ORDER — POTASSIUM CHLORIDE 1.5 G/1.58G
40 POWDER, FOR SOLUTION ORAL ONCE
Status: COMPLETED | OUTPATIENT
Start: 2022-01-01 | End: 2022-01-01

## 2022-01-01 RX ORDER — DEXTROSE, SODIUM CHLORIDE, SODIUM LACTATE, POTASSIUM CHLORIDE, AND CALCIUM CHLORIDE 5; .6; .31; .03; .02 G/100ML; G/100ML; G/100ML; G/100ML; G/100ML
INJECTION, SOLUTION INTRAVENOUS CONTINUOUS
Status: DISCONTINUED | OUTPATIENT
Start: 2022-01-01 | End: 2022-01-01

## 2022-01-01 RX ORDER — HYDROMORPHONE HYDROCHLORIDE 1 MG/ML
0.5 INJECTION, SOLUTION INTRAMUSCULAR; INTRAVENOUS; SUBCUTANEOUS ONCE
Status: COMPLETED | OUTPATIENT
Start: 2022-01-01 | End: 2022-01-01

## 2022-01-01 RX ORDER — PIPERACILLIN SODIUM, TAZOBACTAM SODIUM 3; .375 G/15ML; G/15ML
3.38 INJECTION, POWDER, LYOPHILIZED, FOR SOLUTION INTRAVENOUS EVERY 6 HOURS
Status: DISCONTINUED | OUTPATIENT
Start: 2022-01-01 | End: 2022-01-01

## 2022-01-01 RX ORDER — AMOXICILLIN 250 MG
1-2 CAPSULE ORAL 2 TIMES DAILY
DISCHARGE
Start: 2022-01-01

## 2022-01-01 RX ORDER — FUROSEMIDE 20 MG
20 TABLET ORAL
Status: DISCONTINUED | OUTPATIENT
Start: 2022-01-01 | End: 2022-01-01

## 2022-01-01 RX ORDER — NALOXONE HYDROCHLORIDE 0.4 MG/ML
0.2 INJECTION, SOLUTION INTRAMUSCULAR; INTRAVENOUS; SUBCUTANEOUS
Status: DISCONTINUED | OUTPATIENT
Start: 2022-01-01 | End: 2022-01-01

## 2022-01-01 RX ORDER — SERTRALINE HYDROCHLORIDE 25 MG/1
25 TABLET, FILM COATED ORAL DAILY
Status: DISPENSED | OUTPATIENT
Start: 2022-01-01 | End: 2022-01-01

## 2022-01-01 RX ORDER — FENTANYL CITRATE 50 UG/ML
INJECTION, SOLUTION INTRAMUSCULAR; INTRAVENOUS PRN
Status: DISCONTINUED | OUTPATIENT
Start: 2022-01-01 | End: 2022-01-01

## 2022-01-01 RX ORDER — LORAZEPAM 2 MG/ML
0.5 INJECTION INTRAMUSCULAR ONCE
Status: COMPLETED | OUTPATIENT
Start: 2022-01-01 | End: 2022-01-01

## 2022-01-01 RX ORDER — POTASSIUM CHLORIDE 20MEQ/15ML
40 LIQUID (ML) ORAL ONCE
Status: COMPLETED | OUTPATIENT
Start: 2022-01-01 | End: 2022-01-01

## 2022-01-01 RX ORDER — LIDOCAINE 40 MG/G
CREAM TOPICAL
Status: ACTIVE | OUTPATIENT
Start: 2022-01-01 | End: 2022-01-01

## 2022-01-01 RX ORDER — LIDOCAINE 40 MG/G
CREAM TOPICAL
Status: DISCONTINUED | OUTPATIENT
Start: 2022-01-01 | End: 2022-01-01

## 2022-01-01 RX ORDER — VANCOMYCIN HYDROCHLORIDE 1 G/200ML
1000 INJECTION, SOLUTION INTRAVENOUS ONCE
Status: DISCONTINUED | OUTPATIENT
Start: 2022-01-01 | End: 2022-01-01

## 2022-01-01 RX ORDER — PROPOFOL 10 MG/ML
5-75 INJECTION, EMULSION INTRAVENOUS CONTINUOUS
Status: DISCONTINUED | OUTPATIENT
Start: 2022-01-01 | End: 2022-01-01

## 2022-01-01 RX ORDER — LORAZEPAM 0.5 MG/1
0.5 TABLET ORAL EVERY 6 HOURS PRN
Status: DISCONTINUED | OUTPATIENT
Start: 2022-01-01 | End: 2022-01-01

## 2022-01-01 RX ORDER — LORAZEPAM 0.5 MG/1
0.5 TABLET ORAL
Status: DISCONTINUED | OUTPATIENT
Start: 2022-01-01 | End: 2022-01-01

## 2022-01-01 RX ORDER — CHLORHEXIDINE GLUCONATE ORAL RINSE 1.2 MG/ML
15 SOLUTION DENTAL EVERY 12 HOURS
Status: DISCONTINUED | OUTPATIENT
Start: 2022-01-01 | End: 2022-01-01

## 2022-01-01 RX ORDER — HYDROMORPHONE HCL IN WATER/PF 6 MG/30 ML
0.2 PATIENT CONTROLLED ANALGESIA SYRINGE INTRAVENOUS ONCE
Status: COMPLETED | OUTPATIENT
Start: 2022-01-01 | End: 2022-01-01

## 2022-01-01 RX ORDER — ASPIRIN 325 MG
325 TABLET ORAL DAILY
Status: DISCONTINUED | OUTPATIENT
Start: 2022-01-01 | End: 2022-01-01

## 2022-01-01 RX ORDER — FLUMAZENIL 0.1 MG/ML
0.2 INJECTION, SOLUTION INTRAVENOUS
Status: CANCELLED | OUTPATIENT
Start: 2022-01-01

## 2022-01-01 RX ORDER — POTASSIUM CHLORIDE 1500 MG/1
40 TABLET, EXTENDED RELEASE ORAL ONCE
Status: COMPLETED | OUTPATIENT
Start: 2022-01-01 | End: 2022-01-01

## 2022-01-01 RX ORDER — HYDROMORPHONE HCL IN WATER/PF 6 MG/30 ML
0.1 PATIENT CONTROLLED ANALGESIA SYRINGE INTRAVENOUS
Status: DISCONTINUED | OUTPATIENT
Start: 2022-01-01 | End: 2022-01-01

## 2022-01-01 RX ORDER — PROPOFOL 10 MG/ML
INJECTION, EMULSION INTRAVENOUS CONTINUOUS PRN
Status: DISCONTINUED | OUTPATIENT
Start: 2022-01-01 | End: 2022-01-01

## 2022-01-01 RX ORDER — ONDANSETRON 2 MG/ML
4 INJECTION INTRAMUSCULAR; INTRAVENOUS EVERY 6 HOURS PRN
Status: DISCONTINUED | OUTPATIENT
Start: 2022-01-01 | End: 2022-01-01 | Stop reason: HOSPADM

## 2022-01-01 RX ORDER — POTASSIUM CHLORIDE 7.45 MG/ML
10 INJECTION INTRAVENOUS
Status: COMPLETED | OUTPATIENT
Start: 2022-01-01 | End: 2022-01-01

## 2022-01-01 RX ORDER — ROPIVACAINE IN 0.9% SOD CHL/PF 0.1 %
.03-.125 PLASTIC BAG, INJECTION (ML) EPIDURAL CONTINUOUS
Status: DISCONTINUED | OUTPATIENT
Start: 2022-01-01 | End: 2022-01-01

## 2022-01-01 RX ORDER — LORAZEPAM 0.5 MG/1
0.5 TABLET ORAL ONCE
Status: COMPLETED | OUTPATIENT
Start: 2022-01-01 | End: 2022-01-01

## 2022-01-01 RX ORDER — BISACODYL 10 MG
10 SUPPOSITORY, RECTAL RECTAL ONCE
Status: COMPLETED | OUTPATIENT
Start: 2022-01-01 | End: 2022-01-01

## 2022-01-01 RX ORDER — HYDRALAZINE HYDROCHLORIDE 20 MG/ML
10 INJECTION INTRAMUSCULAR; INTRAVENOUS EVERY 6 HOURS PRN
Status: DISCONTINUED | OUTPATIENT
Start: 2022-01-01 | End: 2022-01-01

## 2022-01-01 RX ORDER — OLANZAPINE 2.5 MG/1
2.5 TABLET, FILM COATED ORAL AT BEDTIME
Status: DISCONTINUED | OUTPATIENT
Start: 2022-01-01 | End: 2022-01-01 | Stop reason: HOSPADM

## 2022-01-01 RX ORDER — HEPARIN SODIUM 1000 [USP'U]/ML
3000 INJECTION, SOLUTION INTRAVENOUS; SUBCUTANEOUS
Status: COMPLETED | OUTPATIENT
Start: 2022-01-01 | End: 2022-01-01

## 2022-01-01 RX ORDER — FUROSEMIDE 40 MG
40 TABLET ORAL DAILY
Status: DISCONTINUED | OUTPATIENT
Start: 2022-01-01 | End: 2022-01-01

## 2022-01-01 RX ORDER — OLANZAPINE 5 MG/1
5 TABLET, ORALLY DISINTEGRATING ORAL EVERY 6 HOURS PRN
Status: DISCONTINUED | OUTPATIENT
Start: 2022-01-01 | End: 2022-01-01 | Stop reason: HOSPADM

## 2022-01-01 RX ORDER — CLOTRIMAZOLE 1 %
CREAM (GRAM) TOPICAL 2 TIMES DAILY
Status: DISCONTINUED | OUTPATIENT
Start: 2022-01-01 | End: 2022-01-01

## 2022-01-01 RX ORDER — SODIUM CHLORIDE, SODIUM LACTATE, POTASSIUM CHLORIDE, CALCIUM CHLORIDE 600; 310; 30; 20 MG/100ML; MG/100ML; MG/100ML; MG/100ML
INJECTION, SOLUTION INTRAVENOUS CONTINUOUS
Status: DISCONTINUED | OUTPATIENT
Start: 2022-01-01 | End: 2022-01-01 | Stop reason: HOSPADM

## 2022-01-01 RX ORDER — NOREPINEPHRINE BITARTRATE 0.02 MG/ML
.01-.6 INJECTION, SOLUTION INTRAVENOUS CONTINUOUS
Status: DISCONTINUED | OUTPATIENT
Start: 2022-01-01 | End: 2022-01-01

## 2022-01-01 RX ORDER — POTASSIUM CHLORIDE 1500 MG/1
20 TABLET, EXTENDED RELEASE ORAL
Status: DISCONTINUED | OUTPATIENT
Start: 2022-01-01 | End: 2022-01-01

## 2022-01-01 RX ORDER — IOPAMIDOL 755 MG/ML
500 INJECTION, SOLUTION INTRAVASCULAR ONCE
Status: COMPLETED | OUTPATIENT
Start: 2022-01-01 | End: 2022-01-01

## 2022-01-01 RX ORDER — NALOXONE HYDROCHLORIDE 0.4 MG/ML
0.2 INJECTION, SOLUTION INTRAMUSCULAR; INTRAVENOUS; SUBCUTANEOUS
Status: DISCONTINUED | OUTPATIENT
Start: 2022-01-01 | End: 2022-01-01 | Stop reason: HOSPADM

## 2022-01-01 RX ORDER — PROPOFOL 10 MG/ML
INJECTION, EMULSION INTRAVENOUS
Status: COMPLETED
Start: 2022-01-01 | End: 2022-01-01

## 2022-01-01 RX ORDER — MEDROXYPROGESTERONE ACETATE 10 MG
10 TABLET ORAL DAILY
Status: DISCONTINUED | OUTPATIENT
Start: 2022-01-01 | End: 2022-01-01 | Stop reason: HOSPADM

## 2022-01-01 RX ORDER — FLUDROCORTISONE ACETATE 0.1 MG/1
0.1 TABLET ORAL DAILY
Status: DISCONTINUED | OUTPATIENT
Start: 2022-01-01 | End: 2022-01-01

## 2022-01-01 RX ORDER — DIPHENHYDRAMINE HYDROCHLORIDE 50 MG/ML
25 INJECTION INTRAMUSCULAR; INTRAVENOUS EVERY 6 HOURS PRN
Status: DISCONTINUED | OUTPATIENT
Start: 2022-01-01 | End: 2022-01-01 | Stop reason: HOSPADM

## 2022-01-01 RX ORDER — AMOXICILLIN 250 MG
1-2 CAPSULE ORAL 2 TIMES DAILY PRN
Status: DISCONTINUED | OUTPATIENT
Start: 2022-01-01 | End: 2022-01-01 | Stop reason: HOSPADM

## 2022-01-01 RX ORDER — WATER 10 ML/10ML
INJECTION INTRAMUSCULAR; INTRAVENOUS; SUBCUTANEOUS
Status: COMPLETED
Start: 2022-01-01 | End: 2022-01-01

## 2022-01-01 RX ORDER — CALCIUM CARBONATE 500 MG/1
500 TABLET, CHEWABLE ORAL 3 TIMES DAILY PRN
Status: DISCONTINUED | OUTPATIENT
Start: 2022-01-01 | End: 2022-01-01 | Stop reason: HOSPADM

## 2022-01-01 RX ORDER — ONDANSETRON 4 MG/1
4 TABLET, ORALLY DISINTEGRATING ORAL EVERY 6 HOURS PRN
Status: DISCONTINUED | OUTPATIENT
Start: 2022-01-01 | End: 2022-01-01 | Stop reason: HOSPADM

## 2022-01-01 RX ORDER — HYDROXYZINE HYDROCHLORIDE 25 MG/1
25 TABLET, FILM COATED ORAL EVERY 6 HOURS PRN
Status: DISCONTINUED | OUTPATIENT
Start: 2022-01-01 | End: 2022-01-01

## 2022-01-01 RX ORDER — NALOXONE HYDROCHLORIDE 0.4 MG/ML
0.2 INJECTION, SOLUTION INTRAMUSCULAR; INTRAVENOUS; SUBCUTANEOUS
Status: CANCELLED | OUTPATIENT
Start: 2022-01-01

## 2022-01-01 RX ORDER — LACTOBACILLUS RHAMNOSUS GG 10B CELL
1 CAPSULE ORAL 2 TIMES DAILY
Status: DISCONTINUED | OUTPATIENT
Start: 2022-01-01 | End: 2022-01-01

## 2022-01-01 RX ORDER — ONDANSETRON 2 MG/ML
INJECTION INTRAMUSCULAR; INTRAVENOUS
Status: COMPLETED
Start: 2022-01-01 | End: 2022-01-01

## 2022-01-01 RX ORDER — POTASSIUM CHLORIDE 20MEQ/15ML
20 LIQUID (ML) ORAL ONCE
Status: COMPLETED | OUTPATIENT
Start: 2022-01-01 | End: 2022-01-01

## 2022-01-01 RX ORDER — LORAZEPAM 2 MG/ML
1 INJECTION INTRAMUSCULAR ONCE
Status: COMPLETED | OUTPATIENT
Start: 2022-01-01 | End: 2022-01-01

## 2022-01-01 RX ORDER — FENTANYL CITRATE 50 UG/ML
25-50 INJECTION, SOLUTION INTRAMUSCULAR; INTRAVENOUS EVERY 5 MIN PRN
Status: DISCONTINUED | OUTPATIENT
Start: 2022-01-01 | End: 2022-01-01 | Stop reason: HOSPADM

## 2022-01-01 RX ORDER — POTASSIUM CHLORIDE 1.5 G/1.58G
40 POWDER, FOR SOLUTION ORAL ONCE
Status: DISCONTINUED | OUTPATIENT
Start: 2022-01-01 | End: 2022-01-01 | Stop reason: ALTCHOICE

## 2022-01-01 RX ORDER — DIPHENHYDRAMINE HCL 25 MG
25 CAPSULE ORAL EVERY 6 HOURS PRN
Status: DISCONTINUED | OUTPATIENT
Start: 2022-01-01 | End: 2022-01-01 | Stop reason: HOSPADM

## 2022-01-01 RX ORDER — MAGNESIUM SULFATE HEPTAHYDRATE 40 MG/ML
4 INJECTION, SOLUTION INTRAVENOUS ONCE
Status: COMPLETED | OUTPATIENT
Start: 2022-01-01 | End: 2022-01-01

## 2022-01-01 RX ORDER — NALOXONE HYDROCHLORIDE 0.4 MG/ML
0.4 INJECTION, SOLUTION INTRAMUSCULAR; INTRAVENOUS; SUBCUTANEOUS
Status: DISCONTINUED | OUTPATIENT
Start: 2022-01-01 | End: 2022-01-01 | Stop reason: HOSPADM

## 2022-01-01 RX ORDER — POTASSIUM CHLORIDE 1500 MG/1
40 TABLET, EXTENDED RELEASE ORAL ONCE
Status: DISCONTINUED | OUTPATIENT
Start: 2022-01-01 | End: 2022-01-01 | Stop reason: CLARIF

## 2022-01-01 RX ORDER — LEVOFLOXACIN 750 MG/1
750 TABLET, FILM COATED ORAL DAILY
Status: DISCONTINUED | OUTPATIENT
Start: 2022-01-01 | End: 2022-01-01

## 2022-01-01 RX ORDER — SODIUM CHLORIDE 9 MG/ML
INJECTION, SOLUTION INTRAVENOUS CONTINUOUS
Status: ACTIVE | OUTPATIENT
Start: 2022-01-01 | End: 2022-01-01

## 2022-01-01 RX ORDER — NALOXONE HYDROCHLORIDE 0.4 MG/ML
0.4 INJECTION, SOLUTION INTRAMUSCULAR; INTRAVENOUS; SUBCUTANEOUS
Status: CANCELLED | OUTPATIENT
Start: 2022-01-01

## 2022-01-01 RX ORDER — HEPARIN SODIUM 200 [USP'U]/100ML
1 INJECTION, SOLUTION INTRAVENOUS CONTINUOUS PRN
Status: CANCELLED | OUTPATIENT
Start: 2022-01-01

## 2022-01-01 RX ORDER — POTASSIUM CHLORIDE 1500 MG/1
20 TABLET, EXTENDED RELEASE ORAL ONCE
Status: COMPLETED | OUTPATIENT
Start: 2022-01-01 | End: 2022-01-01

## 2022-01-01 RX ORDER — FUROSEMIDE 40 MG
40 TABLET ORAL DAILY
DISCHARGE
Start: 2022-01-01

## 2022-01-01 RX ORDER — POTASSIUM CHLORIDE 29.8 MG/ML
20 INJECTION INTRAVENOUS
Status: COMPLETED | OUTPATIENT
Start: 2022-01-01 | End: 2022-01-01

## 2022-01-01 RX ORDER — POTASSIUM CHLORIDE 1500 MG/1
40 TABLET, EXTENDED RELEASE ORAL ONCE
Status: DISCONTINUED | OUTPATIENT
Start: 2022-01-01 | End: 2022-01-01

## 2022-01-01 RX ORDER — POTASSIUM CHLORIDE 1500 MG/1
40 TABLET, EXTENDED RELEASE ORAL ONCE
Status: DISCONTINUED | OUTPATIENT
Start: 2022-01-01 | End: 2022-01-01 | Stop reason: ALTCHOICE

## 2022-01-01 RX ORDER — PROPOFOL 10 MG/ML
5-75 INJECTION, EMULSION INTRAVENOUS CONTINUOUS
Status: DISCONTINUED | OUTPATIENT
Start: 2022-01-01 | End: 2022-01-01 | Stop reason: HOSPADM

## 2022-01-01 RX ORDER — LISINOPRIL 10 MG/1
10 TABLET ORAL DAILY
DISCHARGE
Start: 2022-01-01

## 2022-01-01 RX ORDER — ATORVASTATIN CALCIUM 40 MG/1
40 TABLET, FILM COATED ORAL EVERY EVENING
Status: DISCONTINUED | OUTPATIENT
Start: 2022-01-01 | End: 2022-01-01

## 2022-01-01 RX ORDER — PROCHLORPERAZINE 25 MG
12.5 SUPPOSITORY, RECTAL RECTAL EVERY 12 HOURS PRN
Status: DISCONTINUED | OUTPATIENT
Start: 2022-01-01 | End: 2022-01-01

## 2022-01-01 RX ORDER — POTASSIUM CHLORIDE 1.5 G/1.58G
40 POWDER, FOR SOLUTION ORAL ONCE
Status: DISCONTINUED | OUTPATIENT
Start: 2022-01-01 | End: 2022-01-01

## 2022-01-01 RX ORDER — CEFAZOLIN SODIUM 2 G/100ML
2 INJECTION, SOLUTION INTRAVENOUS EVERY 8 HOURS
Status: DISCONTINUED | OUTPATIENT
Start: 2022-01-01 | End: 2022-01-01

## 2022-01-01 RX ORDER — POLYETHYLENE GLYCOL 3350 17 G/17G
17 POWDER, FOR SOLUTION ORAL DAILY
Status: DISCONTINUED | OUTPATIENT
Start: 2022-01-01 | End: 2022-01-01 | Stop reason: HOSPADM

## 2022-01-01 RX ORDER — HYDROMORPHONE HCL IN WATER/PF 6 MG/30 ML
0.2 PATIENT CONTROLLED ANALGESIA SYRINGE INTRAVENOUS
Status: DISCONTINUED | OUTPATIENT
Start: 2022-01-01 | End: 2022-01-01

## 2022-01-01 RX ORDER — OXYCODONE HYDROCHLORIDE 5 MG/1
5 TABLET ORAL EVERY 4 HOURS PRN
Status: DISCONTINUED | OUTPATIENT
Start: 2022-01-01 | End: 2022-01-01

## 2022-01-01 RX ORDER — POTASSIUM CHLORIDE 1.5 G/1.58G
20 POWDER, FOR SOLUTION ORAL 3 TIMES DAILY
Status: DISCONTINUED | OUTPATIENT
Start: 2022-01-01 | End: 2022-01-01

## 2022-01-01 RX ORDER — DIPHENHYDRAMINE HYDROCHLORIDE 50 MG/ML
25 INJECTION INTRAMUSCULAR; INTRAVENOUS ONCE
Status: COMPLETED | OUTPATIENT
Start: 2022-01-01 | End: 2022-01-01

## 2022-01-01 RX ORDER — QUETIAPINE FUMARATE 25 MG/1
25 TABLET, FILM COATED ORAL AT BEDTIME
Status: DISCONTINUED | OUTPATIENT
Start: 2022-01-01 | End: 2022-01-01

## 2022-01-01 RX ORDER — ASPIRIN 325 MG
325 TABLET ORAL DAILY
DISCHARGE
Start: 2022-01-01

## 2022-01-01 RX ORDER — FUROSEMIDE 40 MG
40 TABLET ORAL
Status: DISCONTINUED | OUTPATIENT
Start: 2022-01-01 | End: 2022-01-01

## 2022-01-01 RX ORDER — HEPARIN SODIUM 200 [USP'U]/100ML
1 INJECTION, SOLUTION INTRAVENOUS CONTINUOUS PRN
Status: DISCONTINUED | OUTPATIENT
Start: 2022-01-01 | End: 2022-01-01 | Stop reason: HOSPADM

## 2022-01-01 RX ORDER — POTASSIUM CHLORIDE 20MEQ/15ML
20 LIQUID (ML) ORAL
Status: DISCONTINUED | OUTPATIENT
Start: 2022-01-01 | End: 2022-01-01

## 2022-01-01 RX ORDER — CEFTRIAXONE 2 G/1
2 INJECTION, POWDER, FOR SOLUTION INTRAMUSCULAR; INTRAVENOUS EVERY 24 HOURS
Status: COMPLETED | OUTPATIENT
Start: 2022-01-01 | End: 2022-01-01

## 2022-01-01 RX ORDER — POTASSIUM CHLORIDE 750 MG/1
40 TABLET, EXTENDED RELEASE ORAL ONCE
Status: DISCONTINUED | OUTPATIENT
Start: 2022-01-01 | End: 2022-01-01

## 2022-01-01 RX ORDER — LISINOPRIL 5 MG/1
5 TABLET ORAL DAILY
Status: DISCONTINUED | OUTPATIENT
Start: 2022-01-01 | End: 2022-01-01

## 2022-01-01 RX ORDER — AMOXICILLIN 250 MG
1 CAPSULE ORAL AT BEDTIME
Status: DISCONTINUED | OUTPATIENT
Start: 2022-01-01 | End: 2022-01-01 | Stop reason: HOSPADM

## 2022-01-01 RX ORDER — ONDANSETRON 2 MG/ML
INJECTION INTRAMUSCULAR; INTRAVENOUS PRN
Status: DISCONTINUED | OUTPATIENT
Start: 2022-01-01 | End: 2022-01-01

## 2022-01-01 RX ORDER — FUROSEMIDE 10 MG/ML
60 INJECTION INTRAMUSCULAR; INTRAVENOUS 2 TIMES DAILY
Status: DISCONTINUED | OUTPATIENT
Start: 2022-01-01 | End: 2022-01-01

## 2022-01-01 RX ORDER — LABETALOL HYDROCHLORIDE 5 MG/ML
10 INJECTION, SOLUTION INTRAVENOUS EVERY 10 MIN PRN
Status: DISCONTINUED | OUTPATIENT
Start: 2022-01-01 | End: 2022-01-01 | Stop reason: HOSPADM

## 2022-01-01 RX ORDER — AMOXICILLIN 250 MG
1-2 CAPSULE ORAL 2 TIMES DAILY
Status: DISCONTINUED | OUTPATIENT
Start: 2022-01-01 | End: 2022-01-01

## 2022-01-01 RX ORDER — MORPHINE SULFATE 20 MG/ML
10 SOLUTION ORAL
Status: DISCONTINUED | OUTPATIENT
Start: 2022-01-01 | End: 2022-01-01 | Stop reason: HOSPADM

## 2022-01-01 RX ORDER — SPIRONOLACTONE 25 MG
12.5 TABLET ORAL DAILY
Status: DISCONTINUED | OUTPATIENT
Start: 2022-01-01 | End: 2022-01-01

## 2022-01-01 RX ORDER — POTASSIUM CHLORIDE 1.5 G/1.58G
40 POWDER, FOR SOLUTION ORAL ONCE
Status: DISCONTINUED | OUTPATIENT
Start: 2022-01-01 | End: 2022-01-01 | Stop reason: CLARIF

## 2022-01-01 RX ORDER — POTASSIUM CHLORIDE 1.5 G/1.58G
20 POWDER, FOR SOLUTION ORAL 2 TIMES DAILY
Status: DISCONTINUED | OUTPATIENT
Start: 2022-01-01 | End: 2022-01-01

## 2022-01-01 RX ORDER — POTASSIUM CHLORIDE 7.45 MG/ML
10 INJECTION INTRAVENOUS
Status: DISPENSED | OUTPATIENT
Start: 2022-01-01 | End: 2022-01-01

## 2022-01-01 RX ORDER — SPIRONOLACTONE 25 MG/1
25 TABLET ORAL DAILY
Status: DISCONTINUED | OUTPATIENT
Start: 2022-01-01 | End: 2022-01-01

## 2022-01-01 RX ORDER — ACETAMINOPHEN 325 MG/1
650 TABLET ORAL EVERY 6 HOURS PRN
Status: DISCONTINUED | OUTPATIENT
Start: 2022-01-01 | End: 2022-01-01

## 2022-01-01 RX ORDER — POLYETHYLENE GLYCOL 3350 17 G/17G
17 POWDER, FOR SOLUTION ORAL DAILY
Qty: 510 G | DISCHARGE
Start: 2022-01-01

## 2022-01-01 RX ORDER — OXYCODONE HYDROCHLORIDE 5 MG/1
5 TABLET ORAL ONCE
Status: COMPLETED | OUTPATIENT
Start: 2022-01-01 | End: 2022-01-01

## 2022-01-01 RX ORDER — VANCOMYCIN HYDROCHLORIDE 1 G/200ML
1000 INJECTION, SOLUTION INTRAVENOUS EVERY 12 HOURS
Status: DISCONTINUED | OUTPATIENT
Start: 2022-01-01 | End: 2022-01-01 | Stop reason: ALTCHOICE

## 2022-01-01 RX ORDER — ENOXAPARIN SODIUM 100 MG/ML
40 INJECTION SUBCUTANEOUS EVERY 12 HOURS
Status: DISCONTINUED | OUTPATIENT
Start: 2022-01-01 | End: 2022-01-01

## 2022-01-01 RX ORDER — PROCHLORPERAZINE MALEATE 5 MG
5 TABLET ORAL EVERY 6 HOURS PRN
Status: DISCONTINUED | OUTPATIENT
Start: 2022-01-01 | End: 2022-01-01

## 2022-01-01 RX ORDER — FUROSEMIDE 10 MG/ML
20 INJECTION INTRAMUSCULAR; INTRAVENOUS ONCE
Status: COMPLETED | OUTPATIENT
Start: 2022-01-01 | End: 2022-01-01

## 2022-01-01 RX ORDER — PRAMOXINE HYDROCHLORIDE 10 MG/ML
LOTION TOPICAL 3 TIMES DAILY PRN
Status: DISCONTINUED | OUTPATIENT
Start: 2022-01-01 | End: 2022-01-01 | Stop reason: HOSPADM

## 2022-01-01 RX ORDER — ARIPIPRAZOLE 5 MG/1
5 TABLET ORAL EVERY EVENING
Status: DISCONTINUED | OUTPATIENT
Start: 2022-01-01 | End: 2022-01-01

## 2022-01-01 RX ORDER — NALOXONE HYDROCHLORIDE 0.4 MG/ML
0.4 INJECTION, SOLUTION INTRAMUSCULAR; INTRAVENOUS; SUBCUTANEOUS
Status: DISCONTINUED | OUTPATIENT
Start: 2022-01-01 | End: 2022-01-01

## 2022-01-01 RX ORDER — NOREPINEPHRINE BITARTRATE 0.06 MG/ML
.01-.6 INJECTION, SOLUTION INTRAVENOUS CONTINUOUS
Status: DISCONTINUED | OUTPATIENT
Start: 2022-01-01 | End: 2022-01-01

## 2022-01-01 RX ORDER — CLINDAMYCIN PHOSPHATE 900 MG/50ML
900 INJECTION, SOLUTION INTRAVENOUS EVERY 8 HOURS
Status: DISCONTINUED | OUTPATIENT
Start: 2022-01-01 | End: 2022-01-01

## 2022-01-01 RX ORDER — ETOMIDATE 2 MG/ML
30 INJECTION INTRAVENOUS ONCE
Status: COMPLETED | OUTPATIENT
Start: 2022-01-01 | End: 2022-01-01

## 2022-01-01 RX ORDER — MORPHINE SULFATE 10 MG/5ML
5 SOLUTION ORAL
Status: DISCONTINUED | OUTPATIENT
Start: 2022-01-01 | End: 2022-01-01 | Stop reason: HOSPADM

## 2022-01-01 RX ORDER — AMOXICILLIN 250 MG
1 CAPSULE ORAL 2 TIMES DAILY
Status: DISCONTINUED | OUTPATIENT
Start: 2022-01-01 | End: 2022-01-01

## 2022-01-01 RX ORDER — HYDROMORPHONE HCL IN WATER/PF 6 MG/30 ML
0.1 PATIENT CONTROLLED ANALGESIA SYRINGE INTRAVENOUS EVERY 4 HOURS PRN
Status: DISCONTINUED | OUTPATIENT
Start: 2022-01-01 | End: 2022-01-01

## 2022-01-01 RX ORDER — PRAMOXINE HYDROCHLORIDE 10 MG/ML
LOTION TOPICAL 3 TIMES DAILY
Status: DISCONTINUED | OUTPATIENT
Start: 2022-01-01 | End: 2022-01-01

## 2022-01-01 RX ORDER — MORPHINE SULFATE 10 MG/5ML
10 SOLUTION ORAL
Status: DISCONTINUED | OUTPATIENT
Start: 2022-01-01 | End: 2022-01-01 | Stop reason: HOSPADM

## 2022-01-01 RX ORDER — LEVOFLOXACIN 500 MG/1
500 TABLET, FILM COATED ORAL DAILY
Status: DISCONTINUED | OUTPATIENT
Start: 2022-01-01 | End: 2022-01-01

## 2022-01-01 RX ORDER — DEXMEDETOMIDINE HYDROCHLORIDE 4 UG/ML
INJECTION, SOLUTION INTRAVENOUS PRN
Status: DISCONTINUED | OUTPATIENT
Start: 2022-01-01 | End: 2022-01-01

## 2022-01-01 RX ORDER — BISACODYL 10 MG
10 SUPPOSITORY, RECTAL RECTAL DAILY PRN
Status: DISCONTINUED | OUTPATIENT
Start: 2022-01-01 | End: 2022-01-01 | Stop reason: HOSPADM

## 2022-01-01 RX ORDER — OLANZAPINE 10 MG/1
1.25 INJECTION, POWDER, LYOPHILIZED, FOR SOLUTION INTRAMUSCULAR ONCE
Status: COMPLETED | OUTPATIENT
Start: 2022-01-01 | End: 2022-01-01

## 2022-01-01 RX ORDER — MIRTAZAPINE 7.5 MG/1
7.5 TABLET, FILM COATED ORAL AT BEDTIME
Status: DISCONTINUED | OUTPATIENT
Start: 2022-01-01 | End: 2022-01-01

## 2022-01-01 RX ORDER — CLINDAMYCIN HCL 300 MG
300 CAPSULE ORAL EVERY 6 HOURS SCHEDULED
Status: DISCONTINUED | OUTPATIENT
Start: 2022-01-01 | End: 2022-01-01

## 2022-01-01 RX ORDER — FENTANYL CITRATE 50 UG/ML
25-50 INJECTION, SOLUTION INTRAMUSCULAR; INTRAVENOUS EVERY 5 MIN PRN
Status: CANCELLED | OUTPATIENT
Start: 2022-01-01

## 2022-01-01 RX ORDER — FUROSEMIDE 10 MG/ML
20 INJECTION INTRAMUSCULAR; INTRAVENOUS EVERY 8 HOURS
Status: DISCONTINUED | OUTPATIENT
Start: 2022-01-01 | End: 2022-01-01

## 2022-01-01 RX ORDER — SALIVA STIMULANT COMB. NO.3
2 SPRAY, NON-AEROSOL (ML) MUCOUS MEMBRANE 4 TIMES DAILY PRN
Status: DISCONTINUED | OUTPATIENT
Start: 2022-01-01 | End: 2022-01-01 | Stop reason: HOSPADM

## 2022-01-01 RX ORDER — NALOXONE HYDROCHLORIDE 0.4 MG/ML
0.1 INJECTION, SOLUTION INTRAMUSCULAR; INTRAVENOUS; SUBCUTANEOUS
Status: DISCONTINUED | OUTPATIENT
Start: 2022-01-01 | End: 2022-01-01 | Stop reason: HOSPADM

## 2022-01-01 RX ORDER — MORPHINE SULFATE 20 MG/ML
5 SOLUTION ORAL
Status: DISCONTINUED | OUTPATIENT
Start: 2022-01-01 | End: 2022-01-01 | Stop reason: HOSPADM

## 2022-01-01 RX ORDER — POTASSIUM CHLORIDE 1.5 G/1.58G
20 POWDER, FOR SOLUTION ORAL DAILY
Status: DISCONTINUED | OUTPATIENT
Start: 2022-01-01 | End: 2022-01-01

## 2022-01-01 RX ORDER — FUROSEMIDE 10 MG/ML
60 INJECTION INTRAMUSCULAR; INTRAVENOUS ONCE
Status: COMPLETED | OUTPATIENT
Start: 2022-01-01 | End: 2022-01-01

## 2022-01-01 RX ORDER — POTASSIUM CHLORIDE 1500 MG/1
20 TABLET, EXTENDED RELEASE ORAL DAILY
Status: DISCONTINUED | OUTPATIENT
Start: 2022-01-01 | End: 2022-01-01

## 2022-01-01 RX ORDER — HYDROMORPHONE HYDROCHLORIDE 1 MG/ML
0.3 INJECTION, SOLUTION INTRAMUSCULAR; INTRAVENOUS; SUBCUTANEOUS ONCE
Status: COMPLETED | OUTPATIENT
Start: 2022-01-01 | End: 2022-01-01

## 2022-01-01 RX ORDER — PIPERACILLIN SODIUM, TAZOBACTAM SODIUM 2; .25 G/10ML; G/10ML
2.25 INJECTION, POWDER, LYOPHILIZED, FOR SOLUTION INTRAVENOUS EVERY 6 HOURS
Status: DISCONTINUED | OUTPATIENT
Start: 2022-01-01 | End: 2022-01-01

## 2022-01-01 RX ORDER — ENOXAPARIN SODIUM 100 MG/ML
40 INJECTION SUBCUTANEOUS EVERY 24 HOURS
Status: DISCONTINUED | OUTPATIENT
Start: 2022-01-01 | End: 2022-01-01

## 2022-01-01 RX ORDER — POTASSIUM CHLORIDE 1.5 G/1.58G
20 POWDER, FOR SOLUTION ORAL ONCE
Status: COMPLETED | OUTPATIENT
Start: 2022-01-01 | End: 2022-01-01

## 2022-01-01 RX ORDER — ARIPIPRAZOLE 5 MG/1
10 TABLET ORAL EVERY EVENING
Status: DISCONTINUED | OUTPATIENT
Start: 2022-01-01 | End: 2022-01-01 | Stop reason: HOSPADM

## 2022-01-01 RX ORDER — VALACYCLOVIR HYDROCHLORIDE 1 G/1
1000 TABLET, FILM COATED ORAL 3 TIMES DAILY
Qty: 21 TABLET | Refills: 0 | Status: ON HOLD | OUTPATIENT
Start: 2022-01-01 | End: 2022-01-01

## 2022-01-01 RX ORDER — LABETALOL HYDROCHLORIDE 5 MG/ML
10 INJECTION, SOLUTION INTRAVENOUS EVERY 6 HOURS PRN
Status: DISCONTINUED | OUTPATIENT
Start: 2022-01-01 | End: 2022-01-01

## 2022-01-01 RX ORDER — HYDRALAZINE HYDROCHLORIDE 20 MG/ML
10-20 INJECTION INTRAMUSCULAR; INTRAVENOUS EVERY 30 MIN PRN
Status: DISCONTINUED | OUTPATIENT
Start: 2022-01-01 | End: 2022-01-01

## 2022-01-01 RX ORDER — LORAZEPAM 0.5 MG/1
0.25 TABLET ORAL DAILY PRN
Status: DISCONTINUED | OUTPATIENT
Start: 2022-01-01 | End: 2022-01-01

## 2022-01-01 RX ORDER — LIDOCAINE 40 MG/G
CREAM TOPICAL
Status: CANCELLED | OUTPATIENT
Start: 2022-01-01

## 2022-01-01 RX ORDER — FLUMAZENIL 0.1 MG/ML
0.2 INJECTION, SOLUTION INTRAVENOUS
Status: DISCONTINUED | OUTPATIENT
Start: 2022-01-01 | End: 2022-01-01 | Stop reason: HOSPADM

## 2022-01-01 RX ORDER — SODIUM CHLORIDE 9 MG/ML
INJECTION, SOLUTION INTRAVENOUS CONTINUOUS
Status: DISCONTINUED | OUTPATIENT
Start: 2022-01-01 | End: 2022-01-01

## 2022-01-01 RX ORDER — LISINOPRIL 10 MG/1
10 TABLET ORAL DAILY
Status: DISCONTINUED | OUTPATIENT
Start: 2022-01-01 | End: 2022-01-01

## 2022-01-01 RX ORDER — LABETALOL HYDROCHLORIDE 5 MG/ML
10-20 INJECTION, SOLUTION INTRAVENOUS EVERY 10 MIN PRN
Status: DISCONTINUED | OUTPATIENT
Start: 2022-01-01 | End: 2022-01-01

## 2022-01-01 RX ORDER — ATROPINE SULFATE 10 MG/ML
2 SOLUTION/ DROPS OPHTHALMIC EVERY 4 HOURS PRN
Status: DISCONTINUED | OUTPATIENT
Start: 2022-01-01 | End: 2022-01-01 | Stop reason: HOSPADM

## 2022-01-01 RX ORDER — FLUCONAZOLE 150 MG/1
150 TABLET ORAL DAILY
Qty: 3 TABLET | Refills: 0 | Status: SHIPPED | OUTPATIENT
Start: 2022-01-01 | End: 2022-01-01

## 2022-01-01 RX ORDER — DIPHENHYDRAMINE HCL 25 MG
50 CAPSULE ORAL
Status: DISCONTINUED | OUTPATIENT
Start: 2022-01-01 | End: 2022-01-01

## 2022-01-01 RX ORDER — POTASSIUM CHLORIDE 29.8 MG/ML
20 INJECTION INTRAVENOUS
Status: DISCONTINUED | OUTPATIENT
Start: 2022-01-01 | End: 2022-01-01

## 2022-01-01 RX ORDER — MEDROXYPROGESTERONE ACETATE 10 MG
10 TABLET ORAL DAILY
Status: DISCONTINUED | OUTPATIENT
Start: 2022-01-01 | End: 2022-01-01

## 2022-01-01 RX ORDER — POTASSIUM CHLORIDE 1500 MG/1
20 TABLET, EXTENDED RELEASE ORAL 3 TIMES DAILY
Status: DISCONTINUED | OUTPATIENT
Start: 2022-01-01 | End: 2022-01-01 | Stop reason: ALTCHOICE

## 2022-01-01 RX ORDER — DIPHENHYDRAMINE HYDROCHLORIDE 50 MG/ML
50 INJECTION INTRAMUSCULAR; INTRAVENOUS
Status: DISCONTINUED | OUTPATIENT
Start: 2022-01-01 | End: 2022-01-01

## 2022-01-01 RX ORDER — NITROGLYCERIN 0.4 MG/1
0.4 TABLET SUBLINGUAL EVERY 5 MIN PRN
Status: DISCONTINUED | OUTPATIENT
Start: 2022-01-01 | End: 2022-01-01

## 2022-01-01 RX ORDER — GUAIFENESIN 600 MG/1
15 TABLET, EXTENDED RELEASE ORAL DAILY
Status: DISCONTINUED | OUTPATIENT
Start: 2022-01-01 | End: 2022-01-01

## 2022-01-01 RX ORDER — MULTIVITAMIN,THERAPEUTIC
1 TABLET ORAL DAILY
Status: DISCONTINUED | OUTPATIENT
Start: 2022-01-01 | End: 2022-01-01

## 2022-01-01 RX ORDER — SODIUM CHLORIDE 9 MG/ML
INJECTION, SOLUTION INTRAVENOUS CONTINUOUS PRN
Status: DISCONTINUED | OUTPATIENT
Start: 2022-01-01 | End: 2022-01-01 | Stop reason: HOSPADM

## 2022-01-01 RX ORDER — DEXTROSE MONOHYDRATE, SODIUM CHLORIDE, AND POTASSIUM CHLORIDE 50; 1.49; 9 G/1000ML; G/1000ML; G/1000ML
INJECTION, SOLUTION INTRAVENOUS CONTINUOUS
Status: DISCONTINUED | OUTPATIENT
Start: 2022-01-01 | End: 2022-01-01

## 2022-01-01 RX ORDER — IOPAMIDOL 612 MG/ML
100 INJECTION, SOLUTION INTRAVASCULAR ONCE
Status: COMPLETED | OUTPATIENT
Start: 2022-01-01 | End: 2022-01-01

## 2022-01-01 RX ORDER — MAGNESIUM HYDROXIDE 1200 MG/15ML
LIQUID ORAL PRN
Status: DISCONTINUED | OUTPATIENT
Start: 2022-01-01 | End: 2022-01-01 | Stop reason: HOSPADM

## 2022-01-01 RX ORDER — VALACYCLOVIR HYDROCHLORIDE 1 G/1
1000 TABLET, FILM COATED ORAL 3 TIMES DAILY
Qty: 21 TABLET | Refills: 0 | OUTPATIENT
Start: 2022-01-01

## 2022-01-01 RX ORDER — HYDRALAZINE HYDROCHLORIDE 20 MG/ML
10 INJECTION INTRAMUSCULAR; INTRAVENOUS EVERY 10 MIN PRN
Status: DISCONTINUED | OUTPATIENT
Start: 2022-01-01 | End: 2022-01-01 | Stop reason: HOSPADM

## 2022-01-01 RX ORDER — CLOTRIMAZOLE 1 %
CREAM (GRAM) TOPICAL 2 TIMES DAILY
Qty: 15 G | Refills: 0 | Status: ON HOLD | OUTPATIENT
Start: 2022-01-01 | End: 2022-01-01

## 2022-01-01 RX ORDER — ATORVASTATIN CALCIUM 40 MG/1
40 TABLET, FILM COATED ORAL EVERY EVENING
DISCHARGE
Start: 2022-01-01

## 2022-01-01 RX ADMIN — MICONAZOLE NITRATE: 2 POWDER TOPICAL at 09:30

## 2022-01-01 RX ADMIN — ENOXAPARIN SODIUM 40 MG: 40 INJECTION SUBCUTANEOUS at 21:26

## 2022-01-01 RX ADMIN — POTASSIUM CHLORIDE 20 MEQ: 1.5 POWDER, FOR SOLUTION ORAL at 09:08

## 2022-01-01 RX ADMIN — ACETAMINOPHEN 975 MG: 325 TABLET ORAL at 01:46

## 2022-01-01 RX ADMIN — PRAMOXINE HYDROCHLORIDE: 10 LOTION TOPICAL at 09:35

## 2022-01-01 RX ADMIN — OXYCODONE HYDROCHLORIDE 5 MG: 5 TABLET ORAL at 20:27

## 2022-01-01 RX ADMIN — POTASSIUM CHLORIDE 20 MEQ: 1.5 POWDER, FOR SOLUTION ORAL at 08:47

## 2022-01-01 RX ADMIN — THERA TABS 1 TABLET: TAB at 17:48

## 2022-01-01 RX ADMIN — MICONAZOLE NITRATE: 2 POWDER TOPICAL at 08:46

## 2022-01-01 RX ADMIN — SENNOSIDES AND DOCUSATE SODIUM 2 TABLET: 8.6; 5 TABLET ORAL at 08:47

## 2022-01-01 RX ADMIN — DICLOFENAC SODIUM 2 G: 10 GEL TOPICAL at 22:07

## 2022-01-01 RX ADMIN — ARIPIPRAZOLE 10 MG: 5 TABLET ORAL at 20:19

## 2022-01-01 RX ADMIN — ARIPIPRAZOLE 10 MG: 5 TABLET ORAL at 20:32

## 2022-01-01 RX ADMIN — MULTIPLE VITAMINS W/ MINERALS TAB 1 TABLET: TAB at 09:19

## 2022-01-01 RX ADMIN — ACETAMINOPHEN 975 MG: 325 TABLET ORAL at 14:24

## 2022-01-01 RX ADMIN — POTASSIUM CHLORIDE 10 MEQ: 7.46 INJECTION, SOLUTION INTRAVENOUS at 03:18

## 2022-01-01 RX ADMIN — HYDROMORPHONE HYDROCHLORIDE 0.1 MG: 0.2 INJECTION, SOLUTION INTRAMUSCULAR; INTRAVENOUS; SUBCUTANEOUS at 14:22

## 2022-01-01 RX ADMIN — ASPIRIN 325 MG ORAL TABLET 325 MG: 325 PILL ORAL at 08:43

## 2022-01-01 RX ADMIN — POTASSIUM CHLORIDE 20 MEQ: 1.5 POWDER, FOR SOLUTION ORAL at 08:52

## 2022-01-01 RX ADMIN — SERTRALINE HYDROCHLORIDE 50 MG: 50 TABLET ORAL at 10:25

## 2022-01-01 RX ADMIN — SPIRONOLACTONE 25 MG: 25 TABLET ORAL at 09:51

## 2022-01-01 RX ADMIN — Medication 1 CAPSULE: at 08:47

## 2022-01-01 RX ADMIN — ENOXAPARIN SODIUM 40 MG: 40 INJECTION SUBCUTANEOUS at 08:03

## 2022-01-01 RX ADMIN — OLANZAPINE 2.5 MG: 2.5 TABLET, FILM COATED ORAL at 21:20

## 2022-01-01 RX ADMIN — ENOXAPARIN SODIUM 40 MG: 40 INJECTION SUBCUTANEOUS at 21:11

## 2022-01-01 RX ADMIN — SERTRALINE HYDROCHLORIDE 50 MG: 50 TABLET ORAL at 10:10

## 2022-01-01 RX ADMIN — ENOXAPARIN SODIUM 40 MG: 40 INJECTION SUBCUTANEOUS at 21:07

## 2022-01-01 RX ADMIN — ASPIRIN 325 MG ORAL TABLET 325 MG: 325 PILL ORAL at 08:59

## 2022-01-01 RX ADMIN — ARIPIPRAZOLE 10 MG: 5 TABLET ORAL at 21:18

## 2022-01-01 RX ADMIN — OLANZAPINE 5 MG: 5 TABLET, ORALLY DISINTEGRATING ORAL at 09:19

## 2022-01-01 RX ADMIN — Medication 12.5 MG: at 09:54

## 2022-01-01 RX ADMIN — ENOXAPARIN SODIUM 40 MG: 40 INJECTION SUBCUTANEOUS at 00:55

## 2022-01-01 RX ADMIN — PRAMOXINE HYDROCHLORIDE: 10 LOTION TOPICAL at 17:19

## 2022-01-01 RX ADMIN — OLANZAPINE 2.5 MG: 2.5 TABLET, FILM COATED ORAL at 21:37

## 2022-01-01 RX ADMIN — ACETAMINOPHEN 975 MG: 325 TABLET ORAL at 09:47

## 2022-01-01 RX ADMIN — OXYCODONE HYDROCHLORIDE 5 MG: 5 TABLET ORAL at 13:56

## 2022-01-01 RX ADMIN — ENOXAPARIN SODIUM 40 MG: 40 INJECTION SUBCUTANEOUS at 20:59

## 2022-01-01 RX ADMIN — SODIUM CHLORIDE, POTASSIUM CHLORIDE, SODIUM LACTATE AND CALCIUM CHLORIDE: 600; 310; 30; 20 INJECTION, SOLUTION INTRAVENOUS at 09:22

## 2022-01-01 RX ADMIN — PRAMOXINE HYDROCHLORIDE: 10 LOTION TOPICAL at 10:21

## 2022-01-01 RX ADMIN — POTASSIUM CHLORIDE 20 MEQ: 1.5 POWDER, FOR SOLUTION ORAL at 10:39

## 2022-01-01 RX ADMIN — POLYETHYLENE GLYCOL 3350 17 G: 17 POWDER, FOR SOLUTION ORAL at 09:53

## 2022-01-01 RX ADMIN — SENNOSIDES AND DOCUSATE SODIUM 1 TABLET: 8.6; 5 TABLET ORAL at 20:31

## 2022-01-01 RX ADMIN — POTASSIUM CHLORIDE 20 MEQ: 1.5 POWDER, FOR SOLUTION ORAL at 21:40

## 2022-01-01 RX ADMIN — ENOXAPARIN SODIUM 40 MG: 40 INJECTION SUBCUTANEOUS at 21:01

## 2022-01-01 RX ADMIN — FUROSEMIDE 40 MG: 40 TABLET ORAL at 17:57

## 2022-01-01 RX ADMIN — POLYETHYLENE GLYCOL 3350 17 G: 17 POWDER, FOR SOLUTION ORAL at 09:04

## 2022-01-01 RX ADMIN — HYDROCORTISONE SODIUM SUCCINATE 100 MG: 100 INJECTION, POWDER, FOR SOLUTION INTRAMUSCULAR; INTRAVENOUS at 02:16

## 2022-01-01 RX ADMIN — ARIPIPRAZOLE 10 MG: 5 TABLET ORAL at 21:12

## 2022-01-01 RX ADMIN — DICLOFENAC SODIUM 2 G: 10 GEL TOPICAL at 21:51

## 2022-01-01 RX ADMIN — MIDAZOLAM 1 MG: 1 INJECTION INTRAMUSCULAR; INTRAVENOUS at 09:24

## 2022-01-01 RX ADMIN — DICLOFENAC SODIUM 2 G: 10 GEL TOPICAL at 17:50

## 2022-01-01 RX ADMIN — POTASSIUM CHLORIDE 20 MEQ: 1.5 POWDER, FOR SOLUTION ORAL at 10:25

## 2022-01-01 RX ADMIN — ATORVASTATIN CALCIUM 40 MG: 40 TABLET, FILM COATED ORAL at 19:55

## 2022-01-01 RX ADMIN — HYDROMORPHONE HYDROCHLORIDE 0.1 MG: 0.2 INJECTION, SOLUTION INTRAMUSCULAR; INTRAVENOUS; SUBCUTANEOUS at 01:10

## 2022-01-01 RX ADMIN — FUROSEMIDE 20 MG: 20 TABLET ORAL at 17:27

## 2022-01-01 RX ADMIN — SERTRALINE HYDROCHLORIDE 50 MG: 50 TABLET ORAL at 10:01

## 2022-01-01 RX ADMIN — FUROSEMIDE 20 MG: 20 TABLET ORAL at 17:06

## 2022-01-01 RX ADMIN — SODIUM CHLORIDE, SODIUM LACTATE, POTASSIUM CHLORIDE, CALCIUM CHLORIDE AND DEXTROSE MONOHYDRATE: 5; 600; 310; 30; 20 INJECTION, SOLUTION INTRAVENOUS at 10:05

## 2022-01-01 RX ADMIN — FUROSEMIDE 20 MG: 10 INJECTION, SOLUTION INTRAMUSCULAR; INTRAVENOUS at 20:20

## 2022-01-01 RX ADMIN — LISINOPRIL 10 MG: 10 TABLET ORAL at 09:18

## 2022-01-01 RX ADMIN — QUETIAPINE FUMARATE 25 MG: 25 TABLET ORAL at 21:11

## 2022-01-01 RX ADMIN — SENNOSIDES AND DOCUSATE SODIUM 1 TABLET: 8.6; 5 TABLET ORAL at 08:18

## 2022-01-01 RX ADMIN — Medication 12.5 MG: at 09:24

## 2022-01-01 RX ADMIN — OXYCODONE HYDROCHLORIDE 5 MG: 5 TABLET ORAL at 11:01

## 2022-01-01 RX ADMIN — ENOXAPARIN SODIUM 40 MG: 40 INJECTION SUBCUTANEOUS at 08:45

## 2022-01-01 RX ADMIN — SENNOSIDES AND DOCUSATE SODIUM 2 TABLET: 8.6; 5 TABLET ORAL at 09:51

## 2022-01-01 RX ADMIN — ENOXAPARIN SODIUM 40 MG: 40 INJECTION SUBCUTANEOUS at 20:48

## 2022-01-01 RX ADMIN — OLANZAPINE 2.5 MG: 2.5 TABLET, FILM COATED ORAL at 22:44

## 2022-01-01 RX ADMIN — ENOXAPARIN SODIUM 40 MG: 40 INJECTION SUBCUTANEOUS at 10:43

## 2022-01-01 RX ADMIN — SERTRALINE HYDROCHLORIDE 50 MG: 50 TABLET ORAL at 08:37

## 2022-01-01 RX ADMIN — OLANZAPINE 5 MG: 5 TABLET, ORALLY DISINTEGRATING ORAL at 17:15

## 2022-01-01 RX ADMIN — ASPIRIN 325 MG ORAL TABLET 325 MG: 325 PILL ORAL at 08:44

## 2022-01-01 RX ADMIN — ENOXAPARIN SODIUM 40 MG: 40 INJECTION SUBCUTANEOUS at 17:56

## 2022-01-01 RX ADMIN — FUROSEMIDE 20 MG: 20 TABLET ORAL at 17:58

## 2022-01-01 RX ADMIN — SERTRALINE HYDROCHLORIDE 25 MG: 25 TABLET ORAL at 14:20

## 2022-01-01 RX ADMIN — OLANZAPINE 5 MG: 5 TABLET, ORALLY DISINTEGRATING ORAL at 00:33

## 2022-01-01 RX ADMIN — POLYETHYLENE GLYCOL 3350 17 G: 17 POWDER, FOR SOLUTION ORAL at 09:18

## 2022-01-01 RX ADMIN — ENOXAPARIN SODIUM 40 MG: 40 INJECTION SUBCUTANEOUS at 21:27

## 2022-01-01 RX ADMIN — FUROSEMIDE 20 MG: 20 TABLET ORAL at 08:54

## 2022-01-01 RX ADMIN — ASPIRIN 325 MG ORAL TABLET 325 MG: 325 PILL ORAL at 09:43

## 2022-01-01 RX ADMIN — OXYCODONE HYDROCHLORIDE 5 MG: 5 TABLET ORAL at 15:00

## 2022-01-01 RX ADMIN — SERTRALINE HYDROCHLORIDE 50 MG: 50 TABLET ORAL at 08:52

## 2022-01-01 RX ADMIN — THERA TABS 1 TABLET: TAB at 16:54

## 2022-01-01 RX ADMIN — SENNOSIDES AND DOCUSATE SODIUM 1 TABLET: 8.6; 5 TABLET ORAL at 22:39

## 2022-01-01 RX ADMIN — MICONAZOLE NITRATE: 2 POWDER TOPICAL at 21:30

## 2022-01-01 RX ADMIN — SENNOSIDES AND DOCUSATE SODIUM 1 TABLET: 8.6; 5 TABLET ORAL at 09:24

## 2022-01-01 RX ADMIN — SPIRONOLACTONE 25 MG: 25 TABLET ORAL at 08:52

## 2022-01-01 RX ADMIN — MIRTAZAPINE 7.5 MG: 7.5 TABLET, FILM COATED ORAL at 21:39

## 2022-01-01 RX ADMIN — ENOXAPARIN SODIUM 40 MG: 40 INJECTION SUBCUTANEOUS at 20:57

## 2022-01-01 RX ADMIN — CLINDAMYCIN HYDROCHLORIDE 300 MG: 300 CAPSULE ORAL at 05:18

## 2022-01-01 RX ADMIN — OXYCODONE HYDROCHLORIDE 2.5 MG: 5 TABLET ORAL at 21:19

## 2022-01-01 RX ADMIN — ARIPIPRAZOLE 10 MG: 5 TABLET ORAL at 19:23

## 2022-01-01 RX ADMIN — ARIPIPRAZOLE 10 MG: 5 TABLET ORAL at 20:42

## 2022-01-01 RX ADMIN — OLANZAPINE 2.5 MG: 2.5 TABLET, FILM COATED ORAL at 21:22

## 2022-01-01 RX ADMIN — MICONAZOLE NITRATE: 2 POWDER TOPICAL at 19:24

## 2022-01-01 RX ADMIN — OXYCODONE HYDROCHLORIDE 5 MG: 5 TABLET ORAL at 13:42

## 2022-01-01 RX ADMIN — SPIRONOLACTONE 25 MG: 25 TABLET ORAL at 08:37

## 2022-01-01 RX ADMIN — SERTRALINE HYDROCHLORIDE 50 MG: 50 TABLET ORAL at 08:39

## 2022-01-01 RX ADMIN — ASPIRIN 325 MG ORAL TABLET 325 MG: 325 PILL ORAL at 10:10

## 2022-01-01 RX ADMIN — MICONAZOLE NITRATE: 2 POWDER TOPICAL at 08:52

## 2022-01-01 RX ADMIN — OLANZAPINE 2.5 MG: 2.5 TABLET, FILM COATED ORAL at 21:01

## 2022-01-01 RX ADMIN — MICONAZOLE NITRATE: 2 POWDER TOPICAL at 21:10

## 2022-01-01 RX ADMIN — LISINOPRIL 10 MG: 10 TABLET ORAL at 09:09

## 2022-01-01 RX ADMIN — OXYCODONE HYDROCHLORIDE 2.5 MG: 5 TABLET ORAL at 16:39

## 2022-01-01 RX ADMIN — OLANZAPINE 5 MG: 5 TABLET, ORALLY DISINTEGRATING ORAL at 14:20

## 2022-01-01 RX ADMIN — FUROSEMIDE 20 MG: 20 TABLET ORAL at 15:56

## 2022-01-01 RX ADMIN — POTASSIUM CHLORIDE 10 MEQ: 7.46 INJECTION, SOLUTION INTRAVENOUS at 12:24

## 2022-01-01 RX ADMIN — QUETIAPINE 12.5 MG: 25 TABLET, FILM COATED ORAL at 17:41

## 2022-01-01 RX ADMIN — ARIPIPRAZOLE 10 MG: 5 TABLET ORAL at 20:12

## 2022-01-01 RX ADMIN — QUETIAPINE 12.5 MG: 25 TABLET, FILM COATED ORAL at 20:00

## 2022-01-01 RX ADMIN — OXYCODONE HYDROCHLORIDE 2.5 MG: 5 TABLET ORAL at 02:18

## 2022-01-01 RX ADMIN — POLYETHYLENE GLYCOL 3350 17 G: 17 POWDER, FOR SOLUTION ORAL at 08:52

## 2022-01-01 RX ADMIN — HYDROXYZINE HYDROCHLORIDE 50 MG: 25 TABLET ORAL at 21:14

## 2022-01-01 RX ADMIN — ENOXAPARIN SODIUM 40 MG: 40 INJECTION SUBCUTANEOUS at 18:15

## 2022-01-01 RX ADMIN — DICLOFENAC SODIUM 2 G: 10 GEL TOPICAL at 12:49

## 2022-01-01 RX ADMIN — CHLORHEXIDINE GLUCONATE 15 ML: 1.2 SOLUTION ORAL at 07:29

## 2022-01-01 RX ADMIN — POLYETHYLENE GLYCOL 3350 17 G: 17 POWDER, FOR SOLUTION ORAL at 09:08

## 2022-01-01 RX ADMIN — ATORVASTATIN CALCIUM 40 MG: 40 TABLET, FILM COATED ORAL at 19:45

## 2022-01-01 RX ADMIN — ARIPIPRAZOLE 5 MG: 5 TABLET ORAL at 19:15

## 2022-01-01 RX ADMIN — Medication 1 CAPSULE: at 21:53

## 2022-01-01 RX ADMIN — POLYETHYLENE GLYCOL 3350 17 G: 17 POWDER, FOR SOLUTION ORAL at 08:48

## 2022-01-01 RX ADMIN — Medication 1 CAPSULE: at 09:37

## 2022-01-01 RX ADMIN — MICONAZOLE NITRATE: 2 POWDER TOPICAL at 00:05

## 2022-01-01 RX ADMIN — ENOXAPARIN SODIUM 40 MG: 40 INJECTION SUBCUTANEOUS at 19:02

## 2022-01-01 RX ADMIN — POTASSIUM CHLORIDE 40 MEQ: 1.5 POWDER, FOR SOLUTION ORAL at 10:13

## 2022-01-01 RX ADMIN — MICONAZOLE NITRATE: 2 POWDER TOPICAL at 21:03

## 2022-01-01 RX ADMIN — ASPIRIN 325 MG ORAL TABLET 325 MG: 325 PILL ORAL at 08:48

## 2022-01-01 RX ADMIN — DICLOFENAC SODIUM 2 G: 10 GEL TOPICAL at 11:15

## 2022-01-01 RX ADMIN — OLANZAPINE 2.5 MG: 2.5 TABLET, FILM COATED ORAL at 22:18

## 2022-01-01 RX ADMIN — HYDROXYZINE HYDROCHLORIDE 25 MG: 25 TABLET, FILM COATED ORAL at 22:38

## 2022-01-01 RX ADMIN — SODIUM CHLORIDE: 9 INJECTION, SOLUTION INTRAVENOUS at 07:21

## 2022-01-01 RX ADMIN — POTASSIUM CHLORIDE 20 MEQ: 1500 TABLET, EXTENDED RELEASE ORAL at 09:14

## 2022-01-01 RX ADMIN — PRAMOXINE HYDROCHLORIDE: 10 LOTION TOPICAL at 17:21

## 2022-01-01 RX ADMIN — OXYCODONE HYDROCHLORIDE 5 MG: 5 TABLET ORAL at 21:11

## 2022-01-01 RX ADMIN — SERTRALINE HYDROCHLORIDE 50 MG: 50 TABLET ORAL at 09:51

## 2022-01-01 RX ADMIN — SENNOSIDES AND DOCUSATE SODIUM 1 TABLET: 8.6; 5 TABLET ORAL at 20:24

## 2022-01-01 RX ADMIN — POLYETHYLENE GLYCOL 3350 17 G: 17 POWDER, FOR SOLUTION ORAL at 09:29

## 2022-01-01 RX ADMIN — PRAMOXINE HYDROCHLORIDE: 10 LOTION TOPICAL at 10:29

## 2022-01-01 RX ADMIN — LORAZEPAM 0.5 MG: 2 INJECTION INTRAMUSCULAR at 02:45

## 2022-01-01 RX ADMIN — LISINOPRIL 10 MG: 10 TABLET ORAL at 08:17

## 2022-01-01 RX ADMIN — SENNOSIDES AND DOCUSATE SODIUM 1 TABLET: 8.6; 5 TABLET ORAL at 21:42

## 2022-01-01 RX ADMIN — CARBIDOPA AND LEVODOPA 2.5 MG: 50; 200 TABLET, EXTENDED RELEASE ORAL at 08:35

## 2022-01-01 RX ADMIN — FUROSEMIDE 40 MG: 40 TABLET ORAL at 09:01

## 2022-01-01 RX ADMIN — PRAMOXINE HYDROCHLORIDE: 10 LOTION TOPICAL at 09:34

## 2022-01-01 RX ADMIN — MICONAZOLE NITRATE: 2 POWDER TOPICAL at 21:21

## 2022-01-01 RX ADMIN — DICLOFENAC SODIUM 2 G: 10 GEL TOPICAL at 09:12

## 2022-01-01 RX ADMIN — ENOXAPARIN SODIUM 40 MG: 40 INJECTION SUBCUTANEOUS at 09:37

## 2022-01-01 RX ADMIN — FUROSEMIDE 20 MG: 20 TABLET ORAL at 15:10

## 2022-01-01 RX ADMIN — ENOXAPARIN SODIUM 40 MG: 40 INJECTION SUBCUTANEOUS at 18:29

## 2022-01-01 RX ADMIN — ENOXAPARIN SODIUM 40 MG: 40 INJECTION SUBCUTANEOUS at 09:51

## 2022-01-01 RX ADMIN — ARIPIPRAZOLE 10 MG: 5 TABLET ORAL at 21:23

## 2022-01-01 RX ADMIN — LORAZEPAM 1 MG: 2 INJECTION INTRAMUSCULAR at 12:27

## 2022-01-01 RX ADMIN — LISINOPRIL 10 MG: 10 TABLET ORAL at 09:17

## 2022-01-01 RX ADMIN — POTASSIUM CHLORIDE 40 MEQ: 1500 TABLET, EXTENDED RELEASE ORAL at 09:25

## 2022-01-01 RX ADMIN — ENOXAPARIN SODIUM 40 MG: 40 INJECTION SUBCUTANEOUS at 10:05

## 2022-01-01 RX ADMIN — FUROSEMIDE 20 MG: 20 TABLET ORAL at 17:43

## 2022-01-01 RX ADMIN — HYDROMORPHONE HYDROCHLORIDE 0.5 MG: 1 INJECTION, SOLUTION INTRAMUSCULAR; INTRAVENOUS; SUBCUTANEOUS at 08:55

## 2022-01-01 RX ADMIN — POLYETHYLENE GLYCOL 3350 17 G: 17 POWDER, FOR SOLUTION ORAL at 08:00

## 2022-01-01 RX ADMIN — ATORVASTATIN CALCIUM 40 MG: 40 TABLET, FILM COATED ORAL at 20:24

## 2022-01-01 RX ADMIN — MICONAZOLE NITRATE: 2 POWDER TOPICAL at 09:16

## 2022-01-01 RX ADMIN — SENNOSIDES AND DOCUSATE SODIUM 2 TABLET: 8.6; 5 TABLET ORAL at 21:44

## 2022-01-01 RX ADMIN — SODIUM CHLORIDE, SODIUM LACTATE, POTASSIUM CHLORIDE, CALCIUM CHLORIDE AND DEXTROSE MONOHYDRATE: 5; 600; 310; 30; 20 INJECTION, SOLUTION INTRAVENOUS at 21:26

## 2022-01-01 RX ADMIN — Medication 1 CAPSULE: at 09:54

## 2022-01-01 RX ADMIN — DIPHENHYDRAMINE HYDROCHLORIDE 25 MG: 50 INJECTION, SOLUTION INTRAMUSCULAR; INTRAVENOUS at 14:05

## 2022-01-01 RX ADMIN — ENOXAPARIN SODIUM 40 MG: 40 INJECTION SUBCUTANEOUS at 09:14

## 2022-01-01 RX ADMIN — OXYCODONE HYDROCHLORIDE 5 MG: 5 TABLET ORAL at 21:53

## 2022-01-01 RX ADMIN — PRAMOXINE HYDROCHLORIDE: 10 LOTION TOPICAL at 17:41

## 2022-01-01 RX ADMIN — POTASSIUM CHLORIDE 10 MEQ: 7.46 INJECTION, SOLUTION INTRAVENOUS at 19:27

## 2022-01-01 RX ADMIN — FUROSEMIDE 20 MG: 20 TABLET ORAL at 18:40

## 2022-01-01 RX ADMIN — DICLOFENAC SODIUM 2 G: 10 GEL TOPICAL at 21:06

## 2022-01-01 RX ADMIN — Medication 1 CAPSULE: at 19:55

## 2022-01-01 RX ADMIN — Medication 1 CAPSULE: at 09:14

## 2022-01-01 RX ADMIN — ENOXAPARIN SODIUM 40 MG: 40 INJECTION SUBCUTANEOUS at 21:42

## 2022-01-01 RX ADMIN — SENNOSIDES AND DOCUSATE SODIUM 1 TABLET: 8.6; 5 TABLET ORAL at 20:12

## 2022-01-01 RX ADMIN — SERTRALINE HYDROCHLORIDE 50 MG: 50 TABLET ORAL at 08:47

## 2022-01-01 RX ADMIN — POTASSIUM CHLORIDE 20 MEQ: 1500 TABLET, EXTENDED RELEASE ORAL at 08:52

## 2022-01-01 RX ADMIN — ATORVASTATIN CALCIUM 40 MG: 40 TABLET, FILM COATED ORAL at 19:59

## 2022-01-01 RX ADMIN — HYDROXYZINE HYDROCHLORIDE 50 MG: 25 TABLET ORAL at 08:15

## 2022-01-01 RX ADMIN — QUETIAPINE FUMARATE 25 MG: 25 TABLET ORAL at 20:27

## 2022-01-01 RX ADMIN — POTASSIUM CHLORIDE 20 MEQ: 1.5 POWDER, FOR SOLUTION ORAL at 22:50

## 2022-01-01 RX ADMIN — ASPIRIN 325 MG ORAL TABLET 325 MG: 325 PILL ORAL at 09:09

## 2022-01-01 RX ADMIN — SPIRONOLACTONE 25 MG: 25 TABLET ORAL at 08:35

## 2022-01-01 RX ADMIN — ENOXAPARIN SODIUM 40 MG: 40 INJECTION SUBCUTANEOUS at 04:12

## 2022-01-01 RX ADMIN — PANTOPRAZOLE SODIUM 40 MG: 40 INJECTION, POWDER, FOR SOLUTION INTRAVENOUS at 07:29

## 2022-01-01 RX ADMIN — OLANZAPINE 2.5 MG: 2.5 TABLET, FILM COATED ORAL at 22:51

## 2022-01-01 RX ADMIN — ASPIRIN 325 MG ORAL TABLET 325 MG: 325 PILL ORAL at 10:25

## 2022-01-01 RX ADMIN — POTASSIUM CHLORIDE 20 MEQ: 29.8 INJECTION, SOLUTION INTRAVENOUS at 08:06

## 2022-01-01 RX ADMIN — ENOXAPARIN SODIUM 40 MG: 40 INJECTION SUBCUTANEOUS at 18:56

## 2022-01-01 RX ADMIN — POTASSIUM CHLORIDE 10 MEQ: 7.46 INJECTION, SOLUTION INTRAVENOUS at 14:30

## 2022-01-01 RX ADMIN — POLYETHYLENE GLYCOL 3350 17 G: 17 POWDER, FOR SOLUTION ORAL at 08:44

## 2022-01-01 RX ADMIN — ARIPIPRAZOLE 10 MG: 5 TABLET ORAL at 21:06

## 2022-01-01 RX ADMIN — MICONAZOLE NITRATE: 2 POWDER TOPICAL at 22:45

## 2022-01-01 RX ADMIN — ACETAMINOPHEN 650 MG: 325 TABLET ORAL at 05:16

## 2022-01-01 RX ADMIN — Medication 1 CAPSULE: at 09:23

## 2022-01-01 RX ADMIN — FUROSEMIDE 20 MG: 20 TABLET ORAL at 18:01

## 2022-01-01 RX ADMIN — ENOXAPARIN SODIUM 40 MG: 40 INJECTION SUBCUTANEOUS at 08:58

## 2022-01-01 RX ADMIN — OLANZAPINE 2.5 MG: 2.5 TABLET, FILM COATED ORAL at 21:49

## 2022-01-01 RX ADMIN — OXYCODONE HYDROCHLORIDE 5 MG: 5 TABLET ORAL at 21:21

## 2022-01-01 RX ADMIN — LISINOPRIL 10 MG: 10 TABLET ORAL at 08:03

## 2022-01-01 RX ADMIN — SENNOSIDES AND DOCUSATE SODIUM 1 TABLET: 8.6; 5 TABLET ORAL at 09:41

## 2022-01-01 RX ADMIN — OXYCODONE HYDROCHLORIDE 5 MG: 5 TABLET ORAL at 22:36

## 2022-01-01 RX ADMIN — LORAZEPAM 0.5 MG: 0.5 TABLET ORAL at 00:11

## 2022-01-01 RX ADMIN — ACETAMINOPHEN 975 MG: 325 TABLET ORAL at 20:00

## 2022-01-01 RX ADMIN — POLYETHYLENE GLYCOL 3350 17 G: 17 POWDER, FOR SOLUTION ORAL at 08:36

## 2022-01-01 RX ADMIN — SENNOSIDES AND DOCUSATE SODIUM 1 TABLET: 8.6; 5 TABLET ORAL at 11:09

## 2022-01-01 RX ADMIN — MICONAZOLE NITRATE: 2 POWDER TOPICAL at 21:25

## 2022-01-01 RX ADMIN — CEFTRIAXONE SODIUM 2 G: 2 INJECTION, POWDER, FOR SOLUTION INTRAMUSCULAR; INTRAVENOUS at 12:01

## 2022-01-01 RX ADMIN — ENOXAPARIN SODIUM 40 MG: 40 INJECTION SUBCUTANEOUS at 08:31

## 2022-01-01 RX ADMIN — MICONAZOLE NITRATE: 2 POWDER TOPICAL at 21:23

## 2022-01-01 RX ADMIN — DEXTROSE AND SODIUM CHLORIDE: 5; 450 INJECTION, SOLUTION INTRAVENOUS at 01:13

## 2022-01-01 RX ADMIN — CLINDAMYCIN IN 5 PERCENT DEXTROSE 900 MG: 18 INJECTION, SOLUTION INTRAVENOUS at 17:32

## 2022-01-01 RX ADMIN — OLANZAPINE 5 MG: 5 TABLET, ORALLY DISINTEGRATING ORAL at 22:44

## 2022-01-01 RX ADMIN — OLANZAPINE 5 MG: 5 TABLET, ORALLY DISINTEGRATING ORAL at 17:12

## 2022-01-01 RX ADMIN — ARIPIPRAZOLE 10 MG: 5 TABLET ORAL at 21:13

## 2022-01-01 RX ADMIN — MICONAZOLE NITRATE: 2 POWDER TOPICAL at 10:16

## 2022-01-01 RX ADMIN — FUROSEMIDE 40 MG: 40 TABLET ORAL at 08:52

## 2022-01-01 RX ADMIN — PRAMOXINE HYDROCHLORIDE: 10 LOTION TOPICAL at 15:03

## 2022-01-01 RX ADMIN — THERA TABS 1 TABLET: TAB at 16:22

## 2022-01-01 RX ADMIN — FUROSEMIDE 20 MG: 20 TABLET ORAL at 15:46

## 2022-01-01 RX ADMIN — POLYETHYLENE GLYCOL 3350 17 G: 17 POWDER, FOR SOLUTION ORAL at 09:09

## 2022-01-01 RX ADMIN — OXYCODONE HYDROCHLORIDE 5 MG: 5 TABLET ORAL at 19:16

## 2022-01-01 RX ADMIN — PRAMOXINE HYDROCHLORIDE: 10 LOTION TOPICAL at 22:13

## 2022-01-01 RX ADMIN — ASPIRIN 325 MG ORAL TABLET 325 MG: 325 PILL ORAL at 08:13

## 2022-01-01 RX ADMIN — QUETIAPINE 12.5 MG: 25 TABLET, FILM COATED ORAL at 16:47

## 2022-01-01 RX ADMIN — SENNOSIDES AND DOCUSATE SODIUM 1 TABLET: 8.6; 5 TABLET ORAL at 09:43

## 2022-01-01 RX ADMIN — QUETIAPINE 12.5 MG: 25 TABLET, FILM COATED ORAL at 18:13

## 2022-01-01 RX ADMIN — ENOXAPARIN SODIUM 40 MG: 40 INJECTION SUBCUTANEOUS at 12:06

## 2022-01-01 RX ADMIN — SENNOSIDES AND DOCUSATE SODIUM 1 TABLET: 8.6; 5 TABLET ORAL at 22:04

## 2022-01-01 RX ADMIN — CLINDAMYCIN HYDROCHLORIDE 300 MG: 300 CAPSULE ORAL at 12:49

## 2022-01-01 RX ADMIN — HYDROMORPHONE HYDROCHLORIDE 0.1 MG: 0.2 INJECTION, SOLUTION INTRAMUSCULAR; INTRAVENOUS; SUBCUTANEOUS at 08:46

## 2022-01-01 RX ADMIN — ACETAMINOPHEN 975 MG: 325 TABLET ORAL at 18:23

## 2022-01-01 RX ADMIN — ATORVASTATIN CALCIUM 40 MG: 40 TABLET, FILM COATED ORAL at 19:50

## 2022-01-01 RX ADMIN — ENOXAPARIN SODIUM 40 MG: 40 INJECTION SUBCUTANEOUS at 06:30

## 2022-01-01 RX ADMIN — SODIUM CHLORIDE: 9 INJECTION, SOLUTION INTRAVENOUS at 05:00

## 2022-01-01 RX ADMIN — ENOXAPARIN SODIUM 40 MG: 40 INJECTION SUBCUTANEOUS at 21:02

## 2022-01-01 RX ADMIN — SENNOSIDES AND DOCUSATE SODIUM 2 TABLET: 8.6; 5 TABLET ORAL at 10:16

## 2022-01-01 RX ADMIN — AMOXICILLIN AND CLAVULANATE POTASSIUM 1 TABLET: 875; 125 TABLET, FILM COATED ORAL at 22:10

## 2022-01-01 RX ADMIN — LORAZEPAM 0.5 MG: 0.5 TABLET ORAL at 23:10

## 2022-01-01 RX ADMIN — LORAZEPAM 0.5 MG: 2 INJECTION INTRAMUSCULAR at 18:23

## 2022-01-01 RX ADMIN — MICONAZOLE NITRATE: 2 POWDER TOPICAL at 09:40

## 2022-01-01 RX ADMIN — MICONAZOLE NITRATE: 2 POWDER TOPICAL at 09:08

## 2022-01-01 RX ADMIN — ACETAMINOPHEN 975 MG: 325 TABLET ORAL at 21:34

## 2022-01-01 RX ADMIN — OXYCODONE HYDROCHLORIDE 5 MG: 5 TABLET ORAL at 09:23

## 2022-01-01 RX ADMIN — Medication 1 CAPSULE: at 21:49

## 2022-01-01 RX ADMIN — OLANZAPINE 2.5 MG: 2.5 TABLET, FILM COATED ORAL at 21:11

## 2022-01-01 RX ADMIN — QUETIAPINE 12.5 MG: 25 TABLET, FILM COATED ORAL at 06:47

## 2022-01-01 RX ADMIN — MICONAZOLE NITRATE: 2 POWDER TOPICAL at 20:07

## 2022-01-01 RX ADMIN — ASPIRIN 325 MG ORAL TABLET 325 MG: 325 PILL ORAL at 08:50

## 2022-01-01 RX ADMIN — MICONAZOLE NITRATE: 2 POWDER TOPICAL at 10:22

## 2022-01-01 RX ADMIN — SERTRALINE HYDROCHLORIDE 50 MG: 50 TABLET ORAL at 08:09

## 2022-01-01 RX ADMIN — ATORVASTATIN CALCIUM 40 MG: 40 TABLET, FILM COATED ORAL at 21:25

## 2022-01-01 RX ADMIN — FUROSEMIDE 20 MG: 20 TABLET ORAL at 09:51

## 2022-01-01 RX ADMIN — QUETIAPINE 12.5 MG: 25 TABLET, FILM COATED ORAL at 16:53

## 2022-01-01 RX ADMIN — MICONAZOLE NITRATE: 2 POWDER TOPICAL at 20:21

## 2022-01-01 RX ADMIN — FUROSEMIDE 40 MG: 40 TABLET ORAL at 09:51

## 2022-01-01 RX ADMIN — HYDROXYZINE HYDROCHLORIDE 50 MG: 25 TABLET ORAL at 21:04

## 2022-01-01 RX ADMIN — ACETAMINOPHEN 975 MG: 325 TABLET ORAL at 08:15

## 2022-01-01 RX ADMIN — FLUDROCORTISONE ACETATE 0.1 MG: 0.1 TABLET ORAL at 09:37

## 2022-01-01 RX ADMIN — ENOXAPARIN SODIUM 40 MG: 40 INJECTION SUBCUTANEOUS at 21:29

## 2022-01-01 RX ADMIN — ASPIRIN 325 MG ORAL TABLET 325 MG: 325 PILL ORAL at 09:50

## 2022-01-01 RX ADMIN — MICONAZOLE NITRATE: 2 POWDER TOPICAL at 21:57

## 2022-01-01 RX ADMIN — ENOXAPARIN SODIUM 40 MG: 40 INJECTION SUBCUTANEOUS at 18:48

## 2022-01-01 RX ADMIN — ENOXAPARIN SODIUM 40 MG: 40 INJECTION SUBCUTANEOUS at 09:29

## 2022-01-01 RX ADMIN — OLANZAPINE 2.5 MG: 2.5 TABLET, FILM COATED ORAL at 21:42

## 2022-01-01 RX ADMIN — ACETAMINOPHEN 650 MG: 325 TABLET ORAL at 03:04

## 2022-01-01 RX ADMIN — ENOXAPARIN SODIUM 40 MG: 40 INJECTION SUBCUTANEOUS at 20:13

## 2022-01-01 RX ADMIN — ENOXAPARIN SODIUM 40 MG: 40 INJECTION SUBCUTANEOUS at 21:50

## 2022-01-01 RX ADMIN — ATORVASTATIN CALCIUM 40 MG: 40 TABLET, FILM COATED ORAL at 20:20

## 2022-01-01 RX ADMIN — OLANZAPINE 2.5 MG: 2.5 TABLET, FILM COATED ORAL at 21:29

## 2022-01-01 RX ADMIN — ONDANSETRON 4 MG: 2 INJECTION INTRAMUSCULAR; INTRAVENOUS at 21:47

## 2022-01-01 RX ADMIN — MICONAZOLE NITRATE: 2 POWDER TOPICAL at 11:16

## 2022-01-01 RX ADMIN — MULTIPLE VITAMINS W/ MINERALS TAB 1 TABLET: TAB at 08:26

## 2022-01-01 RX ADMIN — Medication 1 CAPSULE: at 08:51

## 2022-01-01 RX ADMIN — ATORVASTATIN CALCIUM 40 MG: 40 TABLET, FILM COATED ORAL at 21:05

## 2022-01-01 RX ADMIN — ATORVASTATIN CALCIUM 40 MG: 40 TABLET, FILM COATED ORAL at 20:32

## 2022-01-01 RX ADMIN — POTASSIUM CHLORIDE 20 MEQ: 20 SOLUTION ORAL at 09:24

## 2022-01-01 RX ADMIN — OLANZAPINE 5 MG: 5 TABLET, ORALLY DISINTEGRATING ORAL at 16:08

## 2022-01-01 RX ADMIN — POTASSIUM CHLORIDE 20 MEQ: 1500 TABLET, EXTENDED RELEASE ORAL at 10:10

## 2022-01-01 RX ADMIN — PROPOFOL 20 MCG/KG/MIN: 10 INJECTION, EMULSION INTRAVENOUS at 14:16

## 2022-01-01 RX ADMIN — MICONAZOLE NITRATE: 2 POWDER TOPICAL at 22:00

## 2022-01-01 RX ADMIN — POTASSIUM CHLORIDE 20 MEQ: 1.5 POWDER, FOR SOLUTION ORAL at 09:39

## 2022-01-01 RX ADMIN — SENNOSIDES AND DOCUSATE SODIUM 2 TABLET: 8.6; 5 TABLET ORAL at 09:09

## 2022-01-01 RX ADMIN — POTASSIUM CHLORIDE 20 MEQ: 1500 TABLET, EXTENDED RELEASE ORAL at 08:44

## 2022-01-01 RX ADMIN — HYDROMORPHONE HYDROCHLORIDE 0.1 MG: 0.2 INJECTION, SOLUTION INTRAMUSCULAR; INTRAVENOUS; SUBCUTANEOUS at 08:48

## 2022-01-01 RX ADMIN — Medication 1 CAPSULE: at 22:39

## 2022-01-01 RX ADMIN — ACETAMINOPHEN 975 MG: 325 TABLET ORAL at 09:00

## 2022-01-01 RX ADMIN — OLANZAPINE 2.5 MG: 2.5 TABLET, FILM COATED ORAL at 21:21

## 2022-01-01 RX ADMIN — HYDROMORPHONE HYDROCHLORIDE 0.2 MG: 0.2 INJECTION, SOLUTION INTRAMUSCULAR; INTRAVENOUS; SUBCUTANEOUS at 21:43

## 2022-01-01 RX ADMIN — MICONAZOLE NITRATE: 2 POWDER TOPICAL at 08:59

## 2022-01-01 RX ADMIN — SODIUM CHLORIDE: 9 INJECTION, SOLUTION INTRAVENOUS at 14:20

## 2022-01-01 RX ADMIN — ARIPIPRAZOLE 10 MG: 5 TABLET ORAL at 20:31

## 2022-01-01 RX ADMIN — SODIUM CHLORIDE: 9 INJECTION, SOLUTION INTRAVENOUS at 22:20

## 2022-01-01 RX ADMIN — SPIRONOLACTONE 25 MG: 25 TABLET ORAL at 08:31

## 2022-01-01 RX ADMIN — ACETAMINOPHEN 975 MG: 325 TABLET ORAL at 16:27

## 2022-01-01 RX ADMIN — ENOXAPARIN SODIUM 40 MG: 40 INJECTION SUBCUTANEOUS at 09:55

## 2022-01-01 RX ADMIN — DICLOFENAC SODIUM 2 G: 10 GEL TOPICAL at 21:38

## 2022-01-01 RX ADMIN — POTASSIUM CHLORIDE 20 MEQ: 1500 TABLET, EXTENDED RELEASE ORAL at 08:45

## 2022-01-01 RX ADMIN — PRAMOXINE HYDROCHLORIDE: 10 LOTION TOPICAL at 09:25

## 2022-01-01 RX ADMIN — ASPIRIN 325 MG ORAL TABLET 325 MG: 325 PILL ORAL at 08:09

## 2022-01-01 RX ADMIN — POTASSIUM CHLORIDE 20 MEQ: 1500 TABLET, EXTENDED RELEASE ORAL at 10:24

## 2022-01-01 RX ADMIN — ARIPIPRAZOLE 10 MG: 5 TABLET ORAL at 20:26

## 2022-01-01 RX ADMIN — POTASSIUM CHLORIDE 20 MEQ: 29.8 INJECTION, SOLUTION INTRAVENOUS at 09:11

## 2022-01-01 RX ADMIN — SENNOSIDES AND DOCUSATE SODIUM 1 TABLET: 8.6; 5 TABLET ORAL at 10:32

## 2022-01-01 RX ADMIN — MICONAZOLE NITRATE: 2 POWDER TOPICAL at 08:16

## 2022-01-01 RX ADMIN — MICONAZOLE NITRATE: 2 POWDER TOPICAL at 21:46

## 2022-01-01 RX ADMIN — OXYCODONE HYDROCHLORIDE 5 MG: 5 TABLET ORAL at 19:50

## 2022-01-01 RX ADMIN — MORPHINE SULFATE 10 MG: 100 SOLUTION ORAL at 00:39

## 2022-01-01 RX ADMIN — SENNOSIDES AND DOCUSATE SODIUM 1 TABLET: 8.6; 5 TABLET ORAL at 21:13

## 2022-01-01 RX ADMIN — MICONAZOLE NITRATE: 2 POWDER TOPICAL at 08:55

## 2022-01-01 RX ADMIN — LISINOPRIL 10 MG: 10 TABLET ORAL at 08:35

## 2022-01-01 RX ADMIN — ATORVASTATIN CALCIUM 40 MG: 40 TABLET, FILM COATED ORAL at 19:24

## 2022-01-01 RX ADMIN — DICLOFENAC SODIUM 2 G: 10 GEL TOPICAL at 22:14

## 2022-01-01 RX ADMIN — DICLOFENAC SODIUM 2 G: 10 GEL TOPICAL at 12:29

## 2022-01-01 RX ADMIN — MICONAZOLE NITRATE: 2 POWDER TOPICAL at 09:34

## 2022-01-01 RX ADMIN — FUROSEMIDE 60 MG: 10 INJECTION, SOLUTION INTRAVENOUS at 20:45

## 2022-01-01 RX ADMIN — Medication 1 CAPSULE: at 20:28

## 2022-01-01 RX ADMIN — POTASSIUM CHLORIDE 20 MEQ: 1.5 POWDER, FOR SOLUTION ORAL at 09:09

## 2022-01-01 RX ADMIN — ACETAMINOPHEN 975 MG: 325 TABLET ORAL at 04:38

## 2022-01-01 RX ADMIN — LISINOPRIL 10 MG: 10 TABLET ORAL at 08:52

## 2022-01-01 RX ADMIN — HYDROMORPHONE HYDROCHLORIDE 0.1 MG: 0.2 INJECTION, SOLUTION INTRAMUSCULAR; INTRAVENOUS; SUBCUTANEOUS at 20:49

## 2022-01-01 RX ADMIN — SERTRALINE HYDROCHLORIDE 50 MG: 50 TABLET ORAL at 09:46

## 2022-01-01 RX ADMIN — OXYCODONE HYDROCHLORIDE 2.5 MG: 5 TABLET ORAL at 06:05

## 2022-01-01 RX ADMIN — ACETAMINOPHEN 975 MG: 325 TABLET ORAL at 23:25

## 2022-01-01 RX ADMIN — POLYETHYLENE GLYCOL 3350 17 G: 17 POWDER, FOR SOLUTION ORAL at 08:30

## 2022-01-01 RX ADMIN — SERTRALINE HYDROCHLORIDE 50 MG: 50 TABLET ORAL at 09:29

## 2022-01-01 RX ADMIN — POTASSIUM CHLORIDE 40 MEQ: 1500 TABLET, EXTENDED RELEASE ORAL at 11:30

## 2022-01-01 RX ADMIN — PIPERACILLIN AND TAZOBACTAM 3.38 G: 3; .375 INJECTION, POWDER, FOR SOLUTION INTRAVENOUS at 12:50

## 2022-01-01 RX ADMIN — SERTRALINE HYDROCHLORIDE 50 MG: 50 TABLET ORAL at 09:33

## 2022-01-01 RX ADMIN — POTASSIUM CHLORIDE, DEXTROSE MONOHYDRATE AND SODIUM CHLORIDE: 150; 5; 900 INJECTION, SOLUTION INTRAVENOUS at 08:36

## 2022-01-01 RX ADMIN — ACETAMINOPHEN 975 MG: 325 TABLET ORAL at 12:13

## 2022-01-01 RX ADMIN — BISACODYL 10 MG: 10 SUPPOSITORY RECTAL at 17:42

## 2022-01-01 RX ADMIN — SENNOSIDES AND DOCUSATE SODIUM 1 TABLET: 8.6; 5 TABLET ORAL at 08:53

## 2022-01-01 RX ADMIN — SENNOSIDES AND DOCUSATE SODIUM 1 TABLET: 8.6; 5 TABLET ORAL at 08:25

## 2022-01-01 RX ADMIN — FUROSEMIDE 40 MG: 40 TABLET ORAL at 09:09

## 2022-01-01 RX ADMIN — DICLOFENAC SODIUM 2 G: 10 GEL TOPICAL at 18:56

## 2022-01-01 RX ADMIN — SENNOSIDES AND DOCUSATE SODIUM 2 TABLET: 8.6; 5 TABLET ORAL at 08:45

## 2022-01-01 RX ADMIN — FUROSEMIDE 20 MG: 20 TABLET ORAL at 17:20

## 2022-01-01 RX ADMIN — DICLOFENAC SODIUM 2 G: 10 GEL TOPICAL at 22:27

## 2022-01-01 RX ADMIN — SODIUM CHLORIDE, SODIUM LACTATE, POTASSIUM CHLORIDE, CALCIUM CHLORIDE AND DEXTROSE MONOHYDRATE: 5; 600; 310; 30; 20 INJECTION, SOLUTION INTRAVENOUS at 15:00

## 2022-01-01 RX ADMIN — ENOXAPARIN SODIUM 40 MG: 40 INJECTION SUBCUTANEOUS at 06:43

## 2022-01-01 RX ADMIN — POLYETHYLENE GLYCOL 3350 17 G: 17 POWDER, FOR SOLUTION ORAL at 11:15

## 2022-01-01 RX ADMIN — SERTRALINE HYDROCHLORIDE 50 MG: 50 TABLET ORAL at 09:13

## 2022-01-01 RX ADMIN — SENNOSIDES AND DOCUSATE SODIUM 1 TABLET: 8.6; 5 TABLET ORAL at 21:30

## 2022-01-01 RX ADMIN — OXYCODONE HYDROCHLORIDE 5 MG: 5 TABLET ORAL at 12:38

## 2022-01-01 RX ADMIN — LORAZEPAM 0.5 MG: 0.5 TABLET ORAL at 20:07

## 2022-01-01 RX ADMIN — MICONAZOLE NITRATE: 2 POWDER TOPICAL at 22:46

## 2022-01-01 RX ADMIN — FUROSEMIDE 20 MG: 20 TABLET ORAL at 16:16

## 2022-01-01 RX ADMIN — QUETIAPINE FUMARATE 25 MG: 25 TABLET ORAL at 22:03

## 2022-01-01 RX ADMIN — QUETIAPINE 12.5 MG: 25 TABLET, FILM COATED ORAL at 17:21

## 2022-01-01 RX ADMIN — POLYETHYLENE GLYCOL 3350 17 G: 17 POWDER, FOR SOLUTION ORAL at 08:33

## 2022-01-01 RX ADMIN — SODIUM PHOSPHATE, MONOBASIC, MONOHYDRATE 15 MMOL: 276; 142 INJECTION, SOLUTION INTRAVENOUS at 12:13

## 2022-01-01 RX ADMIN — ACETAMINOPHEN 975 MG: 325 TABLET ORAL at 11:25

## 2022-01-01 RX ADMIN — FUROSEMIDE 20 MG: 20 TABLET ORAL at 15:47

## 2022-01-01 RX ADMIN — POTASSIUM CHLORIDE 10 MEQ: 7.46 INJECTION, SOLUTION INTRAVENOUS at 09:39

## 2022-01-01 RX ADMIN — PRAMOXINE HYDROCHLORIDE: 10 LOTION TOPICAL at 21:45

## 2022-01-01 RX ADMIN — OLANZAPINE 5 MG: 5 TABLET, ORALLY DISINTEGRATING ORAL at 03:17

## 2022-01-01 RX ADMIN — ENOXAPARIN SODIUM 40 MG: 40 INJECTION SUBCUTANEOUS at 17:48

## 2022-01-01 RX ADMIN — ASPIRIN 325 MG ORAL TABLET 325 MG: 325 PILL ORAL at 08:45

## 2022-01-01 RX ADMIN — FUROSEMIDE 20 MG: 20 TABLET ORAL at 16:30

## 2022-01-01 RX ADMIN — LORAZEPAM 0.5 MG: 0.5 TABLET ORAL at 18:48

## 2022-01-01 RX ADMIN — ASPIRIN 325 MG ORAL TABLET 325 MG: 325 PILL ORAL at 09:11

## 2022-01-01 RX ADMIN — FUROSEMIDE 20 MG: 20 TABLET ORAL at 16:47

## 2022-01-01 RX ADMIN — FUROSEMIDE 60 MG: 10 INJECTION, SOLUTION INTRAVENOUS at 12:06

## 2022-01-01 RX ADMIN — ATORVASTATIN CALCIUM 40 MG: 40 TABLET, FILM COATED ORAL at 20:31

## 2022-01-01 RX ADMIN — ATORVASTATIN CALCIUM 40 MG: 40 TABLET, FILM COATED ORAL at 19:46

## 2022-01-01 RX ADMIN — POTASSIUM CHLORIDE 10 MEQ: 7.46 INJECTION, SOLUTION INTRAVENOUS at 19:04

## 2022-01-01 RX ADMIN — OXYCODONE HYDROCHLORIDE 5 MG: 5 TABLET ORAL at 07:34

## 2022-01-01 RX ADMIN — ENOXAPARIN SODIUM 40 MG: 40 INJECTION SUBCUTANEOUS at 17:20

## 2022-01-01 RX ADMIN — ATORVASTATIN CALCIUM 40 MG: 40 TABLET, FILM COATED ORAL at 20:26

## 2022-01-01 RX ADMIN — LISINOPRIL 10 MG: 10 TABLET ORAL at 08:47

## 2022-01-01 RX ADMIN — PRAMOXINE HYDROCHLORIDE: 10 LOTION TOPICAL at 22:46

## 2022-01-01 RX ADMIN — Medication 1 CAPSULE: at 11:34

## 2022-01-01 RX ADMIN — ENOXAPARIN SODIUM 40 MG: 40 INJECTION SUBCUTANEOUS at 08:54

## 2022-01-01 RX ADMIN — ENOXAPARIN SODIUM 40 MG: 40 INJECTION SUBCUTANEOUS at 09:30

## 2022-01-01 RX ADMIN — DICLOFENAC SODIUM 2 G: 10 GEL TOPICAL at 22:00

## 2022-01-01 RX ADMIN — LISINOPRIL 10 MG: 10 TABLET ORAL at 08:49

## 2022-01-01 RX ADMIN — Medication 12.5 MG: at 08:00

## 2022-01-01 RX ADMIN — SERTRALINE HYDROCHLORIDE 50 MG: 50 TABLET ORAL at 09:27

## 2022-01-01 RX ADMIN — MICONAZOLE NITRATE: 2 POWDER TOPICAL at 20:40

## 2022-01-01 RX ADMIN — LORAZEPAM 0.5 MG: 0.5 TABLET ORAL at 17:43

## 2022-01-01 RX ADMIN — ENOXAPARIN SODIUM 40 MG: 40 INJECTION SUBCUTANEOUS at 09:21

## 2022-01-01 RX ADMIN — DEXTROSE AND SODIUM CHLORIDE: 5; 450 INJECTION, SOLUTION INTRAVENOUS at 00:46

## 2022-01-01 RX ADMIN — PROPOFOL 20 MCG/KG/MIN: 10 INJECTION, EMULSION INTRAVENOUS at 02:51

## 2022-01-01 RX ADMIN — ATORVASTATIN CALCIUM 40 MG: 40 TABLET, FILM COATED ORAL at 20:42

## 2022-01-01 RX ADMIN — ASPIRIN 325 MG ORAL TABLET 325 MG: 325 PILL ORAL at 10:31

## 2022-01-01 RX ADMIN — MORPHINE SULFATE 10 MG: 100 SOLUTION ORAL at 04:32

## 2022-01-01 RX ADMIN — ENOXAPARIN SODIUM 40 MG: 40 INJECTION SUBCUTANEOUS at 21:14

## 2022-01-01 RX ADMIN — MICONAZOLE NITRATE: 2 POWDER TOPICAL at 09:47

## 2022-01-01 RX ADMIN — ASPIRIN 325 MG ORAL TABLET 325 MG: 325 PILL ORAL at 09:51

## 2022-01-01 RX ADMIN — MICONAZOLE NITRATE: 2 POWDER TOPICAL at 09:39

## 2022-01-01 RX ADMIN — OLANZAPINE 2.5 MG: 2.5 TABLET, FILM COATED ORAL at 20:59

## 2022-01-01 RX ADMIN — POTASSIUM CHLORIDE 40 MEQ: 1500 TABLET, EXTENDED RELEASE ORAL at 10:33

## 2022-01-01 RX ADMIN — ENOXAPARIN SODIUM 40 MG: 40 INJECTION SUBCUTANEOUS at 09:36

## 2022-01-01 RX ADMIN — LISINOPRIL 10 MG: 10 TABLET ORAL at 08:31

## 2022-01-01 RX ADMIN — PRAMOXINE HYDROCHLORIDE: 10 LOTION TOPICAL at 18:34

## 2022-01-01 RX ADMIN — SENNOSIDES AND DOCUSATE SODIUM 1 TABLET: 8.6; 5 TABLET ORAL at 21:53

## 2022-01-01 RX ADMIN — POTASSIUM CHLORIDE 20 MEQ: 1500 TABLET, EXTENDED RELEASE ORAL at 08:57

## 2022-01-01 RX ADMIN — POTASSIUM CHLORIDE 20 MEQ: 1500 TABLET, EXTENDED RELEASE ORAL at 15:42

## 2022-01-01 RX ADMIN — FUROSEMIDE 40 MG: 40 TABLET ORAL at 11:14

## 2022-01-01 RX ADMIN — ASPIRIN 325 MG ORAL TABLET 325 MG: 325 PILL ORAL at 08:52

## 2022-01-01 RX ADMIN — OXYCODONE HYDROCHLORIDE 2.5 MG: 5 TABLET ORAL at 04:58

## 2022-01-01 RX ADMIN — POTASSIUM CHLORIDE 20 MEQ: 1.5 POWDER, FOR SOLUTION ORAL at 09:19

## 2022-01-01 RX ADMIN — PROPOFOL 40 MCG/KG/MIN: 10 INJECTION, EMULSION INTRAVENOUS at 17:59

## 2022-01-01 RX ADMIN — SENNOSIDES AND DOCUSATE SODIUM 1 TABLET: 8.6; 5 TABLET ORAL at 09:09

## 2022-01-01 RX ADMIN — MULTIPLE VITAMINS W/ MINERALS TAB 1 TABLET: TAB at 09:29

## 2022-01-01 RX ADMIN — SODIUM CHLORIDE, SODIUM LACTATE, POTASSIUM CHLORIDE, CALCIUM CHLORIDE AND DEXTROSE MONOHYDRATE: 5; 600; 310; 30; 20 INJECTION, SOLUTION INTRAVENOUS at 08:25

## 2022-01-01 RX ADMIN — MICONAZOLE NITRATE: 2 POWDER TOPICAL at 08:45

## 2022-01-01 RX ADMIN — LORAZEPAM 0.5 MG: 0.5 TABLET ORAL at 18:29

## 2022-01-01 RX ADMIN — SENNOSIDES AND DOCUSATE SODIUM 1 TABLET: 8.6; 5 TABLET ORAL at 20:47

## 2022-01-01 RX ADMIN — ATORVASTATIN CALCIUM 40 MG: 40 TABLET, FILM COATED ORAL at 21:42

## 2022-01-01 RX ADMIN — ENOXAPARIN SODIUM 40 MG: 40 INJECTION SUBCUTANEOUS at 10:16

## 2022-01-01 RX ADMIN — FUROSEMIDE 20 MG: 20 TABLET ORAL at 09:09

## 2022-01-01 RX ADMIN — ATORVASTATIN CALCIUM 40 MG: 40 TABLET, FILM COATED ORAL at 19:51

## 2022-01-01 RX ADMIN — MICONAZOLE NITRATE: 2 POWDER TOPICAL at 20:08

## 2022-01-01 RX ADMIN — Medication 12.5 MG: at 09:43

## 2022-01-01 RX ADMIN — ENOXAPARIN SODIUM 40 MG: 40 INJECTION SUBCUTANEOUS at 20:50

## 2022-01-01 RX ADMIN — ASPIRIN 325 MG ORAL TABLET 325 MG: 325 PILL ORAL at 10:27

## 2022-01-01 RX ADMIN — ACETAMINOPHEN 975 MG: 325 TABLET ORAL at 14:20

## 2022-01-01 RX ADMIN — ARIPIPRAZOLE 10 MG: 5 TABLET ORAL at 20:21

## 2022-01-01 RX ADMIN — DICLOFENAC SODIUM 2 G: 10 GEL TOPICAL at 17:28

## 2022-01-01 RX ADMIN — OLANZAPINE 2.5 MG: 2.5 TABLET, FILM COATED ORAL at 21:16

## 2022-01-01 RX ADMIN — PRAMOXINE HYDROCHLORIDE: 10 LOTION TOPICAL at 17:49

## 2022-01-01 RX ADMIN — MICONAZOLE NITRATE: 2 POWDER TOPICAL at 09:36

## 2022-01-01 RX ADMIN — SERTRALINE HYDROCHLORIDE 50 MG: 50 TABLET ORAL at 08:31

## 2022-01-01 RX ADMIN — QUETIAPINE FUMARATE 25 MG: 25 TABLET ORAL at 22:39

## 2022-01-01 RX ADMIN — ATORVASTATIN CALCIUM 40 MG: 40 TABLET, FILM COATED ORAL at 21:55

## 2022-01-01 RX ADMIN — SPIRONOLACTONE 25 MG: 25 TABLET ORAL at 08:26

## 2022-01-01 RX ADMIN — ENOXAPARIN SODIUM 40 MG: 40 INJECTION SUBCUTANEOUS at 21:13

## 2022-01-01 RX ADMIN — SENNOSIDES AND DOCUSATE SODIUM 2 TABLET: 8.6; 5 TABLET ORAL at 08:01

## 2022-01-01 RX ADMIN — PRAMOXINE HYDROCHLORIDE: 10 LOTION TOPICAL at 11:33

## 2022-01-01 RX ADMIN — ATORVASTATIN CALCIUM 40 MG: 40 TABLET, FILM COATED ORAL at 20:17

## 2022-01-01 RX ADMIN — ENOXAPARIN SODIUM 40 MG: 40 INJECTION SUBCUTANEOUS at 21:04

## 2022-01-01 RX ADMIN — ENOXAPARIN SODIUM 40 MG: 40 INJECTION SUBCUTANEOUS at 05:40

## 2022-01-01 RX ADMIN — OLANZAPINE 2.5 MG: 2.5 TABLET, FILM COATED ORAL at 21:18

## 2022-01-01 RX ADMIN — POTASSIUM CHLORIDE 40 MEQ: 1.5 POWDER, FOR SOLUTION ORAL at 12:06

## 2022-01-01 RX ADMIN — FUROSEMIDE 20 MG: 20 TABLET ORAL at 17:08

## 2022-01-01 RX ADMIN — FUROSEMIDE 20 MG: 20 TABLET ORAL at 09:17

## 2022-01-01 RX ADMIN — MICONAZOLE NITRATE: 2 POWDER TOPICAL at 21:51

## 2022-01-01 RX ADMIN — ENOXAPARIN SODIUM 40 MG: 40 INJECTION SUBCUTANEOUS at 22:00

## 2022-01-01 RX ADMIN — SENNOSIDES AND DOCUSATE SODIUM 1 TABLET: 8.6; 5 TABLET ORAL at 09:20

## 2022-01-01 RX ADMIN — SENNOSIDES AND DOCUSATE SODIUM 2 TABLET: 8.6; 5 TABLET ORAL at 11:06

## 2022-01-01 RX ADMIN — SENNOSIDES AND DOCUSATE SODIUM 2 TABLET: 8.6; 5 TABLET ORAL at 08:54

## 2022-01-01 RX ADMIN — LORAZEPAM 0.5 MG: 0.5 TABLET ORAL at 10:18

## 2022-01-01 RX ADMIN — Medication 0.1 MCG/KG/MIN: at 13:36

## 2022-01-01 RX ADMIN — Medication 1 CAPSULE: at 08:19

## 2022-01-01 RX ADMIN — Medication 1 CAPSULE: at 08:54

## 2022-01-01 RX ADMIN — SERTRALINE HYDROCHLORIDE 50 MG: 50 TABLET ORAL at 09:37

## 2022-01-01 RX ADMIN — Medication 1 CAPSULE: at 22:35

## 2022-01-01 RX ADMIN — MULTIPLE VITAMINS W/ MINERALS TAB 1 TABLET: TAB at 09:35

## 2022-01-01 RX ADMIN — BNT162B2 30 MCG: 0.23 INJECTION, SUSPENSION INTRAMUSCULAR at 10:10

## 2022-01-01 RX ADMIN — MICONAZOLE NITRATE: 2 POWDER TOPICAL at 23:11

## 2022-01-01 RX ADMIN — FUROSEMIDE 20 MG: 20 TABLET ORAL at 10:12

## 2022-01-01 RX ADMIN — ARIPIPRAZOLE 10 MG: 5 TABLET ORAL at 21:11

## 2022-01-01 RX ADMIN — MICONAZOLE NITRATE: 2 POWDER TOPICAL at 09:19

## 2022-01-01 RX ADMIN — OXYCODONE HYDROCHLORIDE 5 MG: 5 TABLET ORAL at 17:48

## 2022-01-01 RX ADMIN — ACETAMINOPHEN 975 MG: 325 TABLET ORAL at 01:23

## 2022-01-01 RX ADMIN — LISINOPRIL 10 MG: 10 TABLET ORAL at 08:46

## 2022-01-01 RX ADMIN — OLANZAPINE 2.5 MG: 2.5 TABLET, FILM COATED ORAL at 21:35

## 2022-01-01 RX ADMIN — THERA TABS 1 TABLET: TAB at 15:47

## 2022-01-01 RX ADMIN — PRAMOXINE HYDROCHLORIDE: 10 LOTION TOPICAL at 21:40

## 2022-01-01 RX ADMIN — POLYETHYLENE GLYCOL 3350 17 G: 17 POWDER, FOR SOLUTION ORAL at 08:10

## 2022-01-01 RX ADMIN — Medication 1 CAPSULE: at 21:16

## 2022-01-01 RX ADMIN — SENNOSIDES AND DOCUSATE SODIUM 1 TABLET: 8.6; 5 TABLET ORAL at 10:33

## 2022-01-01 RX ADMIN — ENOXAPARIN SODIUM 40 MG: 40 INJECTION SUBCUTANEOUS at 05:53

## 2022-01-01 RX ADMIN — POTASSIUM & SODIUM PHOSPHATES POWDER PACK 280-160-250 MG 1 PACKET: 280-160-250 PACK at 08:26

## 2022-01-01 RX ADMIN — POTASSIUM CHLORIDE 20 MEQ: 1.5 POWDER, FOR SOLUTION ORAL at 09:34

## 2022-01-01 RX ADMIN — FUROSEMIDE 20 MG: 20 TABLET ORAL at 08:34

## 2022-01-01 RX ADMIN — SUCCINYLCHOLINE CHLORIDE 200 MG: 20 INJECTION, SOLUTION INTRAMUSCULAR; INTRAVENOUS at 14:06

## 2022-01-01 RX ADMIN — OXYCODONE HYDROCHLORIDE 5 MG: 5 TABLET ORAL at 12:47

## 2022-01-01 RX ADMIN — POTASSIUM CHLORIDE 20 MEQ: 20 SOLUTION ORAL at 10:18

## 2022-01-01 RX ADMIN — DICLOFENAC SODIUM 2 G: 10 GEL TOPICAL at 22:36

## 2022-01-01 RX ADMIN — SERTRALINE HYDROCHLORIDE 50 MG: 50 TABLET ORAL at 09:11

## 2022-01-01 RX ADMIN — POTASSIUM CHLORIDE 40 MEQ: 1500 TABLET, EXTENDED RELEASE ORAL at 14:23

## 2022-01-01 RX ADMIN — FUROSEMIDE 20 MG: 20 TABLET ORAL at 09:29

## 2022-01-01 RX ADMIN — MICONAZOLE NITRATE: 2 POWDER TOPICAL at 21:41

## 2022-01-01 RX ADMIN — POTASSIUM CHLORIDE 10 MEQ: 7.46 INJECTION, SOLUTION INTRAVENOUS at 16:55

## 2022-01-01 RX ADMIN — ARIPIPRAZOLE 10 MG: 5 TABLET ORAL at 21:42

## 2022-01-01 RX ADMIN — DEXTROSE AND SODIUM CHLORIDE: 5; 450 INJECTION, SOLUTION INTRAVENOUS at 19:27

## 2022-01-01 RX ADMIN — OLANZAPINE 2.5 MG: 2.5 TABLET, FILM COATED ORAL at 22:05

## 2022-01-01 RX ADMIN — DICLOFENAC SODIUM 2 G: 10 GEL TOPICAL at 22:47

## 2022-01-01 RX ADMIN — SENNOSIDES AND DOCUSATE SODIUM 1 TABLET: 8.6; 5 TABLET ORAL at 20:05

## 2022-01-01 RX ADMIN — ACETAMINOPHEN 975 MG: 325 TABLET ORAL at 16:24

## 2022-01-01 RX ADMIN — POTASSIUM CHLORIDE 20 MEQ: 1500 TABLET, EXTENDED RELEASE ORAL at 09:23

## 2022-01-01 RX ADMIN — CLINDAMYCIN HYDROCHLORIDE 300 MG: 300 CAPSULE ORAL at 02:03

## 2022-01-01 RX ADMIN — ENOXAPARIN SODIUM 40 MG: 40 INJECTION SUBCUTANEOUS at 05:28

## 2022-01-01 RX ADMIN — ONDANSETRON 4 MG: 2 INJECTION INTRAMUSCULAR; INTRAVENOUS at 09:41

## 2022-01-01 RX ADMIN — LORAZEPAM 0.5 MG: 0.5 TABLET ORAL at 20:31

## 2022-01-01 RX ADMIN — ENOXAPARIN SODIUM 40 MG: 40 INJECTION SUBCUTANEOUS at 21:16

## 2022-01-01 RX ADMIN — POLYETHYLENE GLYCOL 3350 17 G: 17 POWDER, FOR SOLUTION ORAL at 09:00

## 2022-01-01 RX ADMIN — FUROSEMIDE 20 MG: 20 TABLET ORAL at 16:46

## 2022-01-01 RX ADMIN — ENOXAPARIN SODIUM 40 MG: 40 INJECTION SUBCUTANEOUS at 12:26

## 2022-01-01 RX ADMIN — MICONAZOLE NITRATE: 2 POWDER TOPICAL at 20:36

## 2022-01-01 RX ADMIN — ACETAMINOPHEN 650 MG: 325 TABLET ORAL at 05:49

## 2022-01-01 RX ADMIN — MICONAZOLE NITRATE: 2 POWDER TOPICAL at 08:56

## 2022-01-01 RX ADMIN — MICONAZOLE NITRATE: 2 POWDER TOPICAL at 10:55

## 2022-01-01 RX ADMIN — MICONAZOLE NITRATE: 2 POWDER TOPICAL at 20:01

## 2022-01-01 RX ADMIN — MORPHINE SULFATE 10 MG: 100 SOLUTION ORAL at 09:19

## 2022-01-01 RX ADMIN — ACETAMINOPHEN 975 MG: 325 TABLET ORAL at 18:19

## 2022-01-01 RX ADMIN — ETOMIDATE 30 MG: 20 INJECTION, SOLUTION INTRAVENOUS at 14:06

## 2022-01-01 RX ADMIN — ARIPIPRAZOLE 10 MG: 5 TABLET ORAL at 19:46

## 2022-01-01 RX ADMIN — OLANZAPINE 5 MG: 5 TABLET, ORALLY DISINTEGRATING ORAL at 21:32

## 2022-01-01 RX ADMIN — FLUDROCORTISONE ACETATE 0.1 MG: 0.1 TABLET ORAL at 08:25

## 2022-01-01 RX ADMIN — OLANZAPINE 2.5 MG: 2.5 TABLET, FILM COATED ORAL at 22:11

## 2022-01-01 RX ADMIN — ENOXAPARIN SODIUM 40 MG: 40 INJECTION SUBCUTANEOUS at 06:09

## 2022-01-01 RX ADMIN — MICONAZOLE NITRATE: 2 POWDER TOPICAL at 21:34

## 2022-01-01 RX ADMIN — POTASSIUM CHLORIDE 10 MEQ: 7.46 INJECTION, SOLUTION INTRAVENOUS at 10:45

## 2022-01-01 RX ADMIN — ARIPIPRAZOLE 10 MG: 5 TABLET ORAL at 22:00

## 2022-01-01 RX ADMIN — OLANZAPINE 2.5 MG: 2.5 TABLET, FILM COATED ORAL at 21:34

## 2022-01-01 RX ADMIN — SENNOSIDES AND DOCUSATE SODIUM 1 TABLET: 8.6; 5 TABLET ORAL at 20:38

## 2022-01-01 RX ADMIN — MICONAZOLE NITRATE: 2 POWDER TOPICAL at 20:24

## 2022-01-01 RX ADMIN — ACETAMINOPHEN 975 MG: 325 TABLET ORAL at 23:09

## 2022-01-01 RX ADMIN — MICONAZOLE NITRATE: 2 POWDER TOPICAL at 21:19

## 2022-01-01 RX ADMIN — ASPIRIN 325 MG ORAL TABLET 325 MG: 325 PILL ORAL at 08:37

## 2022-01-01 RX ADMIN — THERA TABS 1 TABLET: TAB at 17:34

## 2022-01-01 RX ADMIN — SODIUM CHLORIDE: 9 INJECTION, SOLUTION INTRAVENOUS at 06:03

## 2022-01-01 RX ADMIN — FUROSEMIDE 20 MG: 20 TABLET ORAL at 10:15

## 2022-01-01 RX ADMIN — OXYCODONE HYDROCHLORIDE 2.5 MG: 5 TABLET ORAL at 21:28

## 2022-01-01 RX ADMIN — CARBIDOPA AND LEVODOPA 2.5 MG: 50; 200 TABLET, EXTENDED RELEASE ORAL at 11:21

## 2022-01-01 RX ADMIN — FUROSEMIDE 40 MG: 40 TABLET ORAL at 08:43

## 2022-01-01 RX ADMIN — ENOXAPARIN SODIUM 40 MG: 40 INJECTION SUBCUTANEOUS at 13:39

## 2022-01-01 RX ADMIN — DICLOFENAC SODIUM 2 G: 10 GEL TOPICAL at 18:46

## 2022-01-01 RX ADMIN — ACETAMINOPHEN 975 MG: 325 TABLET ORAL at 18:46

## 2022-01-01 RX ADMIN — POTASSIUM CHLORIDE 20 MEQ: 1.5 POWDER, FOR SOLUTION ORAL at 09:32

## 2022-01-01 RX ADMIN — SENNOSIDES AND DOCUSATE SODIUM 1 TABLET: 8.6; 5 TABLET ORAL at 08:45

## 2022-01-01 RX ADMIN — Medication 0.03 MCG/KG/MIN: at 13:35

## 2022-01-01 RX ADMIN — MICONAZOLE NITRATE: 2 POWDER TOPICAL at 22:35

## 2022-01-01 RX ADMIN — ATORVASTATIN CALCIUM 40 MG: 40 TABLET, FILM COATED ORAL at 21:23

## 2022-01-01 RX ADMIN — MAGNESIUM SULFATE HEPTAHYDRATE 4 G: 40 INJECTION, SOLUTION INTRAVENOUS at 22:38

## 2022-01-01 RX ADMIN — OXYCODONE HYDROCHLORIDE 5 MG: 5 TABLET ORAL at 20:38

## 2022-01-01 RX ADMIN — POTASSIUM CHLORIDE 40 MEQ: 1500 TABLET, EXTENDED RELEASE ORAL at 12:00

## 2022-01-01 RX ADMIN — SENNOSIDES AND DOCUSATE SODIUM 1 TABLET: 8.6; 5 TABLET ORAL at 08:37

## 2022-01-01 RX ADMIN — DICLOFENAC SODIUM 2 G: 10 GEL TOPICAL at 17:48

## 2022-01-01 RX ADMIN — FUROSEMIDE 20 MG: 20 TABLET ORAL at 16:22

## 2022-01-01 RX ADMIN — FUROSEMIDE 20 MG: 20 TABLET ORAL at 15:07

## 2022-01-01 RX ADMIN — ARIPIPRAZOLE 10 MG: 5 TABLET ORAL at 22:15

## 2022-01-01 RX ADMIN — SENNOSIDES AND DOCUSATE SODIUM 1 TABLET: 8.6; 5 TABLET ORAL at 09:30

## 2022-01-01 RX ADMIN — SENNOSIDES AND DOCUSATE SODIUM 1 TABLET: 8.6; 5 TABLET ORAL at 08:52

## 2022-01-01 RX ADMIN — PRAMOXINE HYDROCHLORIDE: 10 LOTION TOPICAL at 23:16

## 2022-01-01 RX ADMIN — AMOXICILLIN AND CLAVULANATE POTASSIUM 1 TABLET: 875; 125 TABLET, FILM COATED ORAL at 21:06

## 2022-01-01 RX ADMIN — Medication 12.5 MG: at 09:26

## 2022-01-01 RX ADMIN — PRAMOXINE HYDROCHLORIDE: 10 LOTION TOPICAL at 10:40

## 2022-01-01 RX ADMIN — ARIPIPRAZOLE 10 MG: 5 TABLET ORAL at 21:35

## 2022-01-01 RX ADMIN — POTASSIUM CHLORIDE 10 MEQ: 7.46 INJECTION, SOLUTION INTRAVENOUS at 10:16

## 2022-01-01 RX ADMIN — OLANZAPINE 2.5 MG: 2.5 TABLET, FILM COATED ORAL at 21:39

## 2022-01-01 RX ADMIN — Medication 12.5 MG: at 08:45

## 2022-01-01 RX ADMIN — SERTRALINE HYDROCHLORIDE 50 MG: 50 TABLET ORAL at 09:09

## 2022-01-01 RX ADMIN — ASPIRIN 325 MG ORAL TABLET 325 MG: 325 PILL ORAL at 08:54

## 2022-01-01 RX ADMIN — SERTRALINE HYDROCHLORIDE 50 MG: 50 TABLET ORAL at 08:59

## 2022-01-01 RX ADMIN — OXYCODONE HYDROCHLORIDE 5 MG: 5 TABLET ORAL at 14:38

## 2022-01-01 RX ADMIN — QUETIAPINE FUMARATE 25 MG: 25 TABLET ORAL at 19:51

## 2022-01-01 RX ADMIN — MICONAZOLE NITRATE: 2 POWDER TOPICAL at 20:15

## 2022-01-01 RX ADMIN — LISINOPRIL 10 MG: 10 TABLET ORAL at 09:37

## 2022-01-01 RX ADMIN — MICONAZOLE NITRATE: 2 POWDER TOPICAL at 20:44

## 2022-01-01 RX ADMIN — MICONAZOLE NITRATE: 2 POWDER TOPICAL at 10:40

## 2022-01-01 RX ADMIN — SERTRALINE HYDROCHLORIDE 50 MG: 50 TABLET ORAL at 09:23

## 2022-01-01 RX ADMIN — LISINOPRIL 10 MG: 10 TABLET ORAL at 09:36

## 2022-01-01 RX ADMIN — HYDROXYZINE HYDROCHLORIDE 50 MG: 25 TABLET ORAL at 19:51

## 2022-01-01 RX ADMIN — MULTIPLE VITAMINS W/ MINERALS TAB 1 TABLET: TAB at 08:54

## 2022-01-01 RX ADMIN — OLANZAPINE 2.5 MG: 2.5 TABLET, FILM COATED ORAL at 21:07

## 2022-01-01 RX ADMIN — LISINOPRIL 10 MG: 10 TABLET ORAL at 09:23

## 2022-01-01 RX ADMIN — SENNOSIDES AND DOCUSATE SODIUM 1 TABLET: 8.6; 5 TABLET ORAL at 21:05

## 2022-01-01 RX ADMIN — ENOXAPARIN SODIUM 40 MG: 40 INJECTION SUBCUTANEOUS at 20:23

## 2022-01-01 RX ADMIN — CLINDAMYCIN HYDROCHLORIDE 300 MG: 300 CAPSULE ORAL at 18:48

## 2022-01-01 RX ADMIN — SERTRALINE HYDROCHLORIDE 50 MG: 50 TABLET ORAL at 10:27

## 2022-01-01 RX ADMIN — OLANZAPINE 2.5 MG: 2.5 TABLET, FILM COATED ORAL at 21:36

## 2022-01-01 RX ADMIN — OLANZAPINE 5 MG: 5 TABLET, ORALLY DISINTEGRATING ORAL at 22:38

## 2022-01-01 RX ADMIN — ATORVASTATIN CALCIUM 40 MG: 40 TABLET, FILM COATED ORAL at 21:11

## 2022-01-01 RX ADMIN — ENOXAPARIN SODIUM 40 MG: 40 INJECTION SUBCUTANEOUS at 10:19

## 2022-01-01 RX ADMIN — ENOXAPARIN SODIUM 40 MG: 40 INJECTION SUBCUTANEOUS at 22:04

## 2022-01-01 RX ADMIN — ENOXAPARIN SODIUM 40 MG: 40 INJECTION SUBCUTANEOUS at 06:50

## 2022-01-01 RX ADMIN — MICONAZOLE NITRATE: 2 POWDER TOPICAL at 11:14

## 2022-01-01 RX ADMIN — OXYCODONE HYDROCHLORIDE 5 MG: 5 TABLET ORAL at 06:15

## 2022-01-01 RX ADMIN — MICONAZOLE NITRATE: 2 POWDER TOPICAL at 10:00

## 2022-01-01 RX ADMIN — DICLOFENAC SODIUM 2 G: 10 GEL TOPICAL at 17:42

## 2022-01-01 RX ADMIN — PRAMOXINE HYDROCHLORIDE: 10 LOTION TOPICAL at 18:46

## 2022-01-01 RX ADMIN — ENOXAPARIN SODIUM 40 MG: 40 INJECTION SUBCUTANEOUS at 08:09

## 2022-01-01 RX ADMIN — OLANZAPINE 2.5 MG: 2.5 TABLET, FILM COATED ORAL at 20:48

## 2022-01-01 RX ADMIN — ENOXAPARIN SODIUM 40 MG: 40 INJECTION SUBCUTANEOUS at 06:25

## 2022-01-01 RX ADMIN — MULTIPLE VITAMINS W/ MINERALS TAB 1 TABLET: TAB at 10:10

## 2022-01-01 RX ADMIN — VANCOMYCIN HYDROCHLORIDE 1000 MG: 1 INJECTION, SOLUTION INTRAVENOUS at 13:31

## 2022-01-01 RX ADMIN — CLINDAMYCIN HYDROCHLORIDE 300 MG: 300 CAPSULE ORAL at 12:29

## 2022-01-01 RX ADMIN — ATORVASTATIN CALCIUM 40 MG: 40 TABLET, FILM COATED ORAL at 20:59

## 2022-01-01 RX ADMIN — ACETAMINOPHEN 975 MG: 325 TABLET ORAL at 17:08

## 2022-01-01 RX ADMIN — ENOXAPARIN SODIUM 40 MG: 40 INJECTION SUBCUTANEOUS at 22:35

## 2022-01-01 RX ADMIN — FUROSEMIDE 20 MG: 20 TABLET ORAL at 15:35

## 2022-01-01 RX ADMIN — POTASSIUM CHLORIDE 20 MEQ: 1500 TABLET, EXTENDED RELEASE ORAL at 10:15

## 2022-01-01 RX ADMIN — DICLOFENAC SODIUM 2 G: 10 GEL TOPICAL at 21:40

## 2022-01-01 RX ADMIN — ENOXAPARIN SODIUM 40 MG: 40 INJECTION SUBCUTANEOUS at 08:21

## 2022-01-01 RX ADMIN — DICLOFENAC SODIUM 2 G: 10 GEL TOPICAL at 09:19

## 2022-01-01 RX ADMIN — Medication 1 CAPSULE: at 20:06

## 2022-01-01 RX ADMIN — Medication 1 CAPSULE: at 21:30

## 2022-01-01 RX ADMIN — SENNOSIDES AND DOCUSATE SODIUM 1 TABLET: 8.6; 5 TABLET ORAL at 20:49

## 2022-01-01 RX ADMIN — ACETAMINOPHEN 975 MG: 325 TABLET ORAL at 09:24

## 2022-01-01 RX ADMIN — POTASSIUM CHLORIDE 40 MEQ: 1500 TABLET, EXTENDED RELEASE ORAL at 17:15

## 2022-01-01 RX ADMIN — ENOXAPARIN SODIUM 40 MG: 40 INJECTION SUBCUTANEOUS at 21:00

## 2022-01-01 RX ADMIN — MORPHINE SULFATE 10 MG: 100 SOLUTION ORAL at 11:20

## 2022-01-01 RX ADMIN — LORAZEPAM 0.5 MG: 0.5 TABLET ORAL at 04:00

## 2022-01-01 RX ADMIN — LISINOPRIL 10 MG: 10 TABLET ORAL at 08:54

## 2022-01-01 RX ADMIN — ACETAMINOPHEN 650 MG: 325 TABLET ORAL at 09:09

## 2022-01-01 RX ADMIN — SERTRALINE HYDROCHLORIDE 50 MG: 50 TABLET ORAL at 08:32

## 2022-01-01 RX ADMIN — DEXTROSE AND SODIUM CHLORIDE: 5; 450 INJECTION, SOLUTION INTRAVENOUS at 12:25

## 2022-01-01 RX ADMIN — POTASSIUM CHLORIDE 20 MEQ: 1.5 POWDER, FOR SOLUTION ORAL at 09:24

## 2022-01-01 RX ADMIN — POLYETHYLENE GLYCOL 3350 17 G: 17 POWDER, FOR SOLUTION ORAL at 11:03

## 2022-01-01 RX ADMIN — ACETAMINOPHEN 975 MG: 325 TABLET ORAL at 20:23

## 2022-01-01 RX ADMIN — ASPIRIN 325 MG ORAL TABLET 325 MG: 325 PILL ORAL at 09:24

## 2022-01-01 RX ADMIN — SODIUM PHOSPHATE, MONOBASIC, MONOHYDRATE 15 MMOL: 276; 142 INJECTION, SOLUTION INTRAVENOUS at 10:35

## 2022-01-01 RX ADMIN — ACETAMINOPHEN 975 MG: 325 TABLET ORAL at 16:54

## 2022-01-01 RX ADMIN — FUROSEMIDE 20 MG: 20 TABLET ORAL at 17:02

## 2022-01-01 RX ADMIN — ENOXAPARIN SODIUM 40 MG: 40 INJECTION SUBCUTANEOUS at 05:48

## 2022-01-01 RX ADMIN — ACETAMINOPHEN 650 MG: 325 TABLET ORAL at 17:35

## 2022-01-01 RX ADMIN — OLANZAPINE 5 MG: 5 TABLET, ORALLY DISINTEGRATING ORAL at 09:29

## 2022-01-01 RX ADMIN — POLYETHYLENE GLYCOL 3350 17 G: 17 POWDER, FOR SOLUTION ORAL at 09:24

## 2022-01-01 RX ADMIN — ASPIRIN 325 MG ORAL TABLET 325 MG: 325 PILL ORAL at 09:48

## 2022-01-01 RX ADMIN — ARIPIPRAZOLE 10 MG: 5 TABLET ORAL at 20:07

## 2022-01-01 RX ADMIN — DICLOFENAC SODIUM 2 G: 10 GEL TOPICAL at 21:34

## 2022-01-01 RX ADMIN — ENOXAPARIN SODIUM 40 MG: 40 INJECTION SUBCUTANEOUS at 12:22

## 2022-01-01 RX ADMIN — PIPERACILLIN AND TAZOBACTAM 3.38 G: 3; .375 INJECTION, POWDER, FOR SOLUTION INTRAVENOUS at 05:26

## 2022-01-01 RX ADMIN — ARIPIPRAZOLE 10 MG: 5 TABLET ORAL at 20:17

## 2022-01-01 RX ADMIN — FUROSEMIDE 20 MG: 20 TABLET ORAL at 08:47

## 2022-01-01 RX ADMIN — SODIUM PHOSPHATE, MONOBASIC, MONOHYDRATE 15 MMOL: 276; 142 INJECTION, SOLUTION INTRAVENOUS at 08:49

## 2022-01-01 RX ADMIN — ONDANSETRON 4 MG: 2 INJECTION INTRAMUSCULAR; INTRAVENOUS at 19:47

## 2022-01-01 RX ADMIN — ACETAMINOPHEN 975 MG: 325 TABLET ORAL at 15:21

## 2022-01-01 RX ADMIN — FUROSEMIDE 40 MG: 40 TABLET ORAL at 09:41

## 2022-01-01 RX ADMIN — OXYCODONE HYDROCHLORIDE 5 MG: 5 TABLET ORAL at 18:05

## 2022-01-01 RX ADMIN — LORAZEPAM 0.5 MG: 0.5 TABLET ORAL at 22:36

## 2022-01-01 RX ADMIN — POLYETHYLENE GLYCOL 3350 17 G: 17 POWDER, FOR SOLUTION ORAL at 10:31

## 2022-01-01 RX ADMIN — MICONAZOLE NITRATE: 2 POWDER TOPICAL at 21:24

## 2022-01-01 RX ADMIN — QUETIAPINE 12.5 MG: 25 TABLET, FILM COATED ORAL at 06:21

## 2022-01-01 RX ADMIN — POTASSIUM CHLORIDE 20 MEQ: 1.5 POWDER, FOR SOLUTION ORAL at 11:01

## 2022-01-01 RX ADMIN — POTASSIUM & SODIUM PHOSPHATES POWDER PACK 280-160-250 MG 1 PACKET: 280-160-250 PACK at 08:47

## 2022-01-01 RX ADMIN — CLINDAMYCIN HYDROCHLORIDE 300 MG: 300 CAPSULE ORAL at 17:27

## 2022-01-01 RX ADMIN — SENNOSIDES AND DOCUSATE SODIUM 1 TABLET: 8.6; 5 TABLET ORAL at 08:47

## 2022-01-01 RX ADMIN — SENNOSIDES AND DOCUSATE SODIUM 1 TABLET: 8.6; 5 TABLET ORAL at 21:27

## 2022-01-01 RX ADMIN — Medication 12.5 MG: at 09:09

## 2022-01-01 RX ADMIN — QUETIAPINE 12.5 MG: 25 TABLET, FILM COATED ORAL at 09:09

## 2022-01-01 RX ADMIN — DICLOFENAC SODIUM 2 G: 10 GEL TOPICAL at 18:26

## 2022-01-01 RX ADMIN — Medication 1 CAPSULE: at 20:27

## 2022-01-01 RX ADMIN — OXYCODONE HYDROCHLORIDE 5 MG: 5 TABLET ORAL at 18:45

## 2022-01-01 RX ADMIN — FUROSEMIDE 40 MG: 40 TABLET ORAL at 07:34

## 2022-01-01 RX ADMIN — ENOXAPARIN SODIUM 40 MG: 40 INJECTION SUBCUTANEOUS at 09:33

## 2022-01-01 RX ADMIN — MEDROXYPROGESTERONE ACETATE 10 MG: 10 TABLET ORAL at 10:07

## 2022-01-01 RX ADMIN — ONDANSETRON 4 MG: 2 INJECTION INTRAMUSCULAR; INTRAVENOUS at 14:11

## 2022-01-01 RX ADMIN — ATORVASTATIN CALCIUM 40 MG: 40 TABLET, FILM COATED ORAL at 19:23

## 2022-01-01 RX ADMIN — ACETAMINOPHEN 975 MG: 325 TABLET ORAL at 02:03

## 2022-01-01 RX ADMIN — QUETIAPINE FUMARATE 25 MG: 25 TABLET ORAL at 20:24

## 2022-01-01 RX ADMIN — ARIPIPRAZOLE 10 MG: 5 TABLET ORAL at 20:47

## 2022-01-01 RX ADMIN — ATORVASTATIN CALCIUM 40 MG: 40 TABLET, FILM COATED ORAL at 20:27

## 2022-01-01 RX ADMIN — SENNOSIDES AND DOCUSATE SODIUM 1 TABLET: 8.6; 5 TABLET ORAL at 21:11

## 2022-01-01 RX ADMIN — OXYCODONE HYDROCHLORIDE 5 MG: 5 TABLET ORAL at 17:20

## 2022-01-01 RX ADMIN — Medication 12.5 MG: at 10:15

## 2022-01-01 RX ADMIN — ACETAMINOPHEN 650 MG: 325 TABLET ORAL at 09:06

## 2022-01-01 RX ADMIN — SERTRALINE HYDROCHLORIDE 50 MG: 50 TABLET ORAL at 09:32

## 2022-01-01 RX ADMIN — LORAZEPAM 0.5 MG: 0.5 TABLET ORAL at 02:31

## 2022-01-01 RX ADMIN — Medication 1 CAPSULE: at 09:09

## 2022-01-01 RX ADMIN — MICONAZOLE NITRATE: 2 POWDER TOPICAL at 10:29

## 2022-01-01 RX ADMIN — OLANZAPINE 5 MG: 5 TABLET, ORALLY DISINTEGRATING ORAL at 16:18

## 2022-01-01 RX ADMIN — HYDROXYZINE HYDROCHLORIDE 50 MG: 25 TABLET ORAL at 14:33

## 2022-01-01 RX ADMIN — QUETIAPINE FUMARATE 25 MG: 25 TABLET ORAL at 20:39

## 2022-01-01 RX ADMIN — SENNOSIDES AND DOCUSATE SODIUM 1 TABLET: 8.6; 5 TABLET ORAL at 08:54

## 2022-01-01 RX ADMIN — ENOXAPARIN SODIUM 40 MG: 40 INJECTION SUBCUTANEOUS at 09:52

## 2022-01-01 RX ADMIN — ENOXAPARIN SODIUM 40 MG: 40 INJECTION SUBCUTANEOUS at 21:20

## 2022-01-01 RX ADMIN — ENOXAPARIN SODIUM 40 MG: 40 INJECTION SUBCUTANEOUS at 21:45

## 2022-01-01 RX ADMIN — DICLOFENAC SODIUM 2 G: 10 GEL TOPICAL at 12:06

## 2022-01-01 RX ADMIN — QUETIAPINE 12.5 MG: 25 TABLET, FILM COATED ORAL at 08:13

## 2022-01-01 RX ADMIN — FUROSEMIDE 20 MG: 20 TABLET ORAL at 16:15

## 2022-01-01 RX ADMIN — ONDANSETRON 4 MG: 2 INJECTION INTRAMUSCULAR; INTRAVENOUS at 10:36

## 2022-01-01 RX ADMIN — FUROSEMIDE 40 MG: 40 TABLET ORAL at 08:46

## 2022-01-01 RX ADMIN — PRAMOXINE HYDROCHLORIDE: 10 LOTION TOPICAL at 09:13

## 2022-01-01 RX ADMIN — SENNOSIDES AND DOCUSATE SODIUM 1 TABLET: 8.6; 5 TABLET ORAL at 08:34

## 2022-01-01 RX ADMIN — MULTIPLE VITAMINS W/ MINERALS TAB 1 TABLET: TAB at 08:47

## 2022-01-01 RX ADMIN — ENOXAPARIN SODIUM 40 MG: 40 INJECTION SUBCUTANEOUS at 23:31

## 2022-01-01 RX ADMIN — ENOXAPARIN SODIUM 40 MG: 40 INJECTION SUBCUTANEOUS at 06:05

## 2022-01-01 RX ADMIN — LORAZEPAM 0.5 MG: 0.5 TABLET ORAL at 17:24

## 2022-01-01 RX ADMIN — MULTIPLE VITAMINS W/ MINERALS TAB 1 TABLET: TAB at 08:45

## 2022-01-01 RX ADMIN — LORAZEPAM 1 MG: 2 INJECTION INTRAMUSCULAR at 12:10

## 2022-01-01 RX ADMIN — OLANZAPINE 2.5 MG: 2.5 TABLET, FILM COATED ORAL at 21:45

## 2022-01-01 RX ADMIN — POTASSIUM CHLORIDE 10 MEQ: 7.46 INJECTION, SOLUTION INTRAVENOUS at 09:23

## 2022-01-01 RX ADMIN — ACETAMINOPHEN 975 MG: 325 TABLET ORAL at 12:21

## 2022-01-01 RX ADMIN — ASPIRIN 325 MG ORAL TABLET 325 MG: 325 PILL ORAL at 09:23

## 2022-01-01 RX ADMIN — HYDROXYZINE HYDROCHLORIDE 25 MG: 25 TABLET, FILM COATED ORAL at 14:56

## 2022-01-01 RX ADMIN — POTASSIUM CHLORIDE 20 MEQ: 1.5 POWDER, FOR SOLUTION ORAL at 09:56

## 2022-01-01 RX ADMIN — POLYETHYLENE GLYCOL 3350 17 G: 17 POWDER, FOR SOLUTION ORAL at 08:47

## 2022-01-01 RX ADMIN — OXYCODONE HYDROCHLORIDE 5 MG: 5 TABLET ORAL at 07:52

## 2022-01-01 RX ADMIN — Medication 12.5 MG: at 09:16

## 2022-01-01 RX ADMIN — HYDROXYZINE HYDROCHLORIDE 25 MG: 25 TABLET, FILM COATED ORAL at 18:56

## 2022-01-01 RX ADMIN — ARIPIPRAZOLE 10 MG: 5 TABLET ORAL at 20:03

## 2022-01-01 RX ADMIN — POTASSIUM CHLORIDE 40 MEQ: 1500 TABLET, EXTENDED RELEASE ORAL at 11:39

## 2022-01-01 RX ADMIN — POTASSIUM CHLORIDE 20 MEQ: 20 SOLUTION ORAL at 16:29

## 2022-01-01 RX ADMIN — FUROSEMIDE 40 MG: 40 TABLET ORAL at 07:52

## 2022-01-01 RX ADMIN — ATORVASTATIN CALCIUM 40 MG: 40 TABLET, FILM COATED ORAL at 19:40

## 2022-01-01 RX ADMIN — POTASSIUM CHLORIDE 40 MEQ: 20 SOLUTION ORAL at 11:19

## 2022-01-01 RX ADMIN — FUROSEMIDE 20 MG: 20 TABLET ORAL at 17:35

## 2022-01-01 RX ADMIN — ONDANSETRON 4 MG: 2 INJECTION INTRAMUSCULAR; INTRAVENOUS at 10:51

## 2022-01-01 RX ADMIN — POLYETHYLENE GLYCOL 3350 17 G: 17 POWDER, FOR SOLUTION ORAL at 08:42

## 2022-01-01 RX ADMIN — DICLOFENAC SODIUM 2 G: 10 GEL TOPICAL at 14:12

## 2022-01-01 RX ADMIN — SENNOSIDES AND DOCUSATE SODIUM 1 TABLET: 8.6; 5 TABLET ORAL at 08:10

## 2022-01-01 RX ADMIN — OLANZAPINE 5 MG: 5 TABLET, ORALLY DISINTEGRATING ORAL at 17:24

## 2022-01-01 RX ADMIN — Medication 1 CAPSULE: at 09:01

## 2022-01-01 RX ADMIN — ENOXAPARIN SODIUM 40 MG: 40 INJECTION SUBCUTANEOUS at 08:38

## 2022-01-01 RX ADMIN — FUROSEMIDE 60 MG: 10 INJECTION, SOLUTION INTRAVENOUS at 11:41

## 2022-01-01 RX ADMIN — PRAMOXINE HYDROCHLORIDE: 10 LOTION TOPICAL at 09:40

## 2022-01-01 RX ADMIN — OLANZAPINE 5 MG: 5 TABLET, ORALLY DISINTEGRATING ORAL at 16:32

## 2022-01-01 RX ADMIN — PRAMOXINE HYDROCHLORIDE: 10 LOTION TOPICAL at 08:54

## 2022-01-01 RX ADMIN — DICLOFENAC SODIUM 2 G: 10 GEL TOPICAL at 09:36

## 2022-01-01 RX ADMIN — DICLOFENAC SODIUM 2 G: 10 GEL TOPICAL at 21:26

## 2022-01-01 RX ADMIN — ATORVASTATIN CALCIUM 40 MG: 40 TABLET, FILM COATED ORAL at 20:03

## 2022-01-01 RX ADMIN — SENNOSIDES AND DOCUSATE SODIUM 2 TABLET: 8.6; 5 TABLET ORAL at 21:50

## 2022-01-01 RX ADMIN — Medication 12.5 MG: at 09:11

## 2022-01-01 RX ADMIN — SODIUM PHOSPHATE, MONOBASIC, MONOHYDRATE 15 MMOL: 276; 142 INJECTION, SOLUTION INTRAVENOUS at 07:56

## 2022-01-01 RX ADMIN — ASPIRIN 325 MG ORAL TABLET 325 MG: 325 PILL ORAL at 08:49

## 2022-01-01 RX ADMIN — QUETIAPINE 12.5 MG: 25 TABLET, FILM COATED ORAL at 20:01

## 2022-01-01 RX ADMIN — Medication 1 CAPSULE: at 09:17

## 2022-01-01 RX ADMIN — SODIUM PHOSPHATE, MONOBASIC, MONOHYDRATE 15 MMOL: 276; 142 INJECTION, SOLUTION INTRAVENOUS at 10:05

## 2022-01-01 RX ADMIN — DICLOFENAC SODIUM 2 G: 10 GEL TOPICAL at 21:41

## 2022-01-01 RX ADMIN — ENOXAPARIN SODIUM 40 MG: 40 INJECTION SUBCUTANEOUS at 04:34

## 2022-01-01 RX ADMIN — OLANZAPINE 2.5 MG: 2.5 TABLET, FILM COATED ORAL at 21:24

## 2022-01-01 RX ADMIN — POTASSIUM CHLORIDE 20 MEQ: 1500 TABLET, EXTENDED RELEASE ORAL at 08:38

## 2022-01-01 RX ADMIN — OXYCODONE HYDROCHLORIDE 5 MG: 5 TABLET ORAL at 05:28

## 2022-01-01 RX ADMIN — THERA TABS 1 TABLET: TAB at 18:46

## 2022-01-01 RX ADMIN — FUROSEMIDE 20 MG: 20 TABLET ORAL at 15:42

## 2022-01-01 RX ADMIN — DICLOFENAC SODIUM 2 G: 10 GEL TOPICAL at 10:22

## 2022-01-01 RX ADMIN — ATORVASTATIN CALCIUM 40 MG: 40 TABLET, FILM COATED ORAL at 20:25

## 2022-01-01 RX ADMIN — POTASSIUM CHLORIDE 20 MEQ: 1.5 POWDER, FOR SOLUTION ORAL at 08:50

## 2022-01-01 RX ADMIN — ENOXAPARIN SODIUM 40 MG: 40 INJECTION SUBCUTANEOUS at 17:43

## 2022-01-01 RX ADMIN — SERTRALINE HYDROCHLORIDE 50 MG: 50 TABLET ORAL at 09:34

## 2022-01-01 RX ADMIN — Medication 1 CAPSULE: at 21:55

## 2022-01-01 RX ADMIN — ARIPIPRAZOLE 10 MG: 5 TABLET ORAL at 21:14

## 2022-01-01 RX ADMIN — PRAMOXINE HYDROCHLORIDE: 10 LOTION TOPICAL at 09:37

## 2022-01-01 RX ADMIN — Medication 12.5 MG: at 09:47

## 2022-01-01 RX ADMIN — Medication 12.5 MG: at 09:51

## 2022-01-01 RX ADMIN — SODIUM CHLORIDE, POTASSIUM CHLORIDE, SODIUM LACTATE AND CALCIUM CHLORIDE 1000 ML: 600; 310; 30; 20 INJECTION, SOLUTION INTRAVENOUS at 11:46

## 2022-01-01 RX ADMIN — SERTRALINE HYDROCHLORIDE 50 MG: 50 TABLET ORAL at 10:40

## 2022-01-01 RX ADMIN — ATORVASTATIN CALCIUM 40 MG: 40 TABLET, FILM COATED ORAL at 19:16

## 2022-01-01 RX ADMIN — LISINOPRIL 10 MG: 10 TABLET ORAL at 09:54

## 2022-01-01 RX ADMIN — DICLOFENAC SODIUM 2 G: 10 GEL TOPICAL at 09:46

## 2022-01-01 RX ADMIN — QUETIAPINE 12.5 MG: 25 TABLET, FILM COATED ORAL at 23:10

## 2022-01-01 RX ADMIN — QUETIAPINE 12.5 MG: 25 TABLET, FILM COATED ORAL at 22:45

## 2022-01-01 RX ADMIN — ARIPIPRAZOLE 10 MG: 5 TABLET ORAL at 22:11

## 2022-01-01 RX ADMIN — Medication 0.12 MCG/KG/MIN: at 19:23

## 2022-01-01 RX ADMIN — ENOXAPARIN SODIUM 40 MG: 40 INJECTION SUBCUTANEOUS at 13:16

## 2022-01-01 RX ADMIN — MICONAZOLE NITRATE: 2 POWDER TOPICAL at 09:56

## 2022-01-01 RX ADMIN — SERTRALINE HYDROCHLORIDE 50 MG: 50 TABLET ORAL at 08:26

## 2022-01-01 RX ADMIN — ENOXAPARIN SODIUM 40 MG: 40 INJECTION SUBCUTANEOUS at 20:31

## 2022-01-01 RX ADMIN — THERA TABS 1 TABLET: TAB at 17:21

## 2022-01-01 RX ADMIN — MICONAZOLE NITRATE: 2 POWDER TOPICAL at 08:50

## 2022-01-01 RX ADMIN — ENOXAPARIN SODIUM 40 MG: 40 INJECTION SUBCUTANEOUS at 11:40

## 2022-01-01 RX ADMIN — POTASSIUM CHLORIDE 10 MEQ: 7.46 INJECTION, SOLUTION INTRAVENOUS at 00:24

## 2022-01-01 RX ADMIN — OLANZAPINE 5 MG: 5 TABLET, ORALLY DISINTEGRATING ORAL at 22:29

## 2022-01-01 RX ADMIN — FUROSEMIDE 40 MG: 40 TABLET ORAL at 09:23

## 2022-01-01 RX ADMIN — ATORVASTATIN CALCIUM 40 MG: 40 TABLET, FILM COATED ORAL at 19:36

## 2022-01-01 RX ADMIN — PRAMOXINE HYDROCHLORIDE: 10 LOTION TOPICAL at 21:52

## 2022-01-01 RX ADMIN — ARIPIPRAZOLE 10 MG: 5 TABLET ORAL at 21:10

## 2022-01-01 RX ADMIN — SENNOSIDES AND DOCUSATE SODIUM 1 TABLET: 8.6; 5 TABLET ORAL at 20:55

## 2022-01-01 RX ADMIN — LISINOPRIL 10 MG: 10 TABLET ORAL at 10:27

## 2022-01-01 RX ADMIN — MULTIPLE VITAMINS W/ MINERALS TAB 1 TABLET: TAB at 08:15

## 2022-01-01 RX ADMIN — DICLOFENAC SODIUM 2 G: 10 GEL TOPICAL at 09:30

## 2022-01-01 RX ADMIN — OXYCODONE HYDROCHLORIDE 5 MG: 5 TABLET ORAL at 09:47

## 2022-01-01 RX ADMIN — OXYCODONE HYDROCHLORIDE 2.5 MG: 5 TABLET ORAL at 21:32

## 2022-01-01 RX ADMIN — SENNOSIDES AND DOCUSATE SODIUM 1 TABLET: 8.6; 5 TABLET ORAL at 22:26

## 2022-01-01 RX ADMIN — ENOXAPARIN SODIUM 40 MG: 40 INJECTION SUBCUTANEOUS at 21:59

## 2022-01-01 RX ADMIN — ARIPIPRAZOLE 10 MG: 5 TABLET ORAL at 20:24

## 2022-01-01 RX ADMIN — MICONAZOLE NITRATE: 2 POWDER TOPICAL at 20:13

## 2022-01-01 RX ADMIN — ENOXAPARIN SODIUM 40 MG: 40 INJECTION SUBCUTANEOUS at 00:38

## 2022-01-01 RX ADMIN — POLYETHYLENE GLYCOL 3350 17 G: 17 POWDER, FOR SOLUTION ORAL at 08:26

## 2022-01-01 RX ADMIN — ASPIRIN 325 MG ORAL TABLET 325 MG: 325 PILL ORAL at 09:00

## 2022-01-01 RX ADMIN — MICONAZOLE NITRATE: 2 POWDER TOPICAL at 21:15

## 2022-01-01 RX ADMIN — ENOXAPARIN SODIUM 40 MG: 40 INJECTION SUBCUTANEOUS at 08:48

## 2022-01-01 RX ADMIN — FUROSEMIDE 20 MG: 20 TABLET ORAL at 18:46

## 2022-01-01 RX ADMIN — ATORVASTATIN CALCIUM 40 MG: 40 TABLET, FILM COATED ORAL at 21:53

## 2022-01-01 RX ADMIN — DICLOFENAC SODIUM 2 G: 10 GEL TOPICAL at 08:43

## 2022-01-01 RX ADMIN — DICLOFENAC SODIUM 2 G: 10 GEL TOPICAL at 21:56

## 2022-01-01 RX ADMIN — OXYCODONE HYDROCHLORIDE 5 MG: 5 TABLET ORAL at 18:23

## 2022-01-01 RX ADMIN — QUETIAPINE FUMARATE 25 MG: 25 TABLET ORAL at 21:00

## 2022-01-01 RX ADMIN — ACETAMINOPHEN 975 MG: 325 TABLET ORAL at 12:51

## 2022-01-01 RX ADMIN — PRAMOXINE HYDROCHLORIDE: 10 LOTION TOPICAL at 22:33

## 2022-01-01 RX ADMIN — FUROSEMIDE 20 MG: 20 TABLET ORAL at 17:33

## 2022-01-01 RX ADMIN — ASPIRIN 325 MG ORAL TABLET 325 MG: 325 PILL ORAL at 09:30

## 2022-01-01 RX ADMIN — POLYETHYLENE GLYCOL 3350 17 G: 17 POWDER, FOR SOLUTION ORAL at 09:13

## 2022-01-01 RX ADMIN — DICLOFENAC SODIUM 2 G: 10 GEL TOPICAL at 10:41

## 2022-01-01 RX ADMIN — MICONAZOLE NITRATE: 2 POWDER TOPICAL at 20:48

## 2022-01-01 RX ADMIN — OLANZAPINE 2.5 MG: 2.5 TABLET, FILM COATED ORAL at 21:13

## 2022-01-01 RX ADMIN — ACETAMINOPHEN 650 MG: 325 TABLET ORAL at 00:49

## 2022-01-01 RX ADMIN — AMOXICILLIN AND CLAVULANATE POTASSIUM 1 TABLET: 875; 125 TABLET, FILM COATED ORAL at 09:47

## 2022-01-01 RX ADMIN — MICONAZOLE NITRATE: 2 POWDER TOPICAL at 22:36

## 2022-01-01 RX ADMIN — ASPIRIN 325 MG ORAL TABLET 325 MG: 325 PILL ORAL at 09:52

## 2022-01-01 RX ADMIN — QUETIAPINE 12.5 MG: 25 TABLET, FILM COATED ORAL at 14:12

## 2022-01-01 RX ADMIN — DICLOFENAC SODIUM 2 G: 10 GEL TOPICAL at 09:08

## 2022-01-01 RX ADMIN — OXYCODONE HYDROCHLORIDE 5 MG: 5 TABLET ORAL at 22:43

## 2022-01-01 RX ADMIN — POTASSIUM CHLORIDE 40 MEQ: 1500 TABLET, EXTENDED RELEASE ORAL at 10:16

## 2022-01-01 RX ADMIN — SENNOSIDES AND DOCUSATE SODIUM 1 TABLET: 8.6; 5 TABLET ORAL at 21:18

## 2022-01-01 RX ADMIN — OLANZAPINE 2.5 MG: 2.5 TABLET, FILM COATED ORAL at 21:50

## 2022-01-01 RX ADMIN — POTASSIUM CHLORIDE 40 MEQ: 1500 TABLET, EXTENDED RELEASE ORAL at 09:18

## 2022-01-01 RX ADMIN — ENOXAPARIN SODIUM 40 MG: 40 INJECTION SUBCUTANEOUS at 21:31

## 2022-01-01 RX ADMIN — POTASSIUM CHLORIDE, DEXTROSE MONOHYDRATE AND SODIUM CHLORIDE: 150; 5; 900 INJECTION, SOLUTION INTRAVENOUS at 06:07

## 2022-01-01 RX ADMIN — MICONAZOLE NITRATE: 2 POWDER TOPICAL at 10:59

## 2022-01-01 RX ADMIN — DICLOFENAC SODIUM 2 G: 10 GEL TOPICAL at 22:11

## 2022-01-01 RX ADMIN — ENOXAPARIN SODIUM 40 MG: 40 INJECTION SUBCUTANEOUS at 08:34

## 2022-01-01 RX ADMIN — SENNOSIDES AND DOCUSATE SODIUM 1 TABLET: 8.6; 5 TABLET ORAL at 09:11

## 2022-01-01 RX ADMIN — FUROSEMIDE 20 MG: 20 TABLET ORAL at 15:53

## 2022-01-01 RX ADMIN — MORPHINE SULFATE 10 MG: 100 SOLUTION ORAL at 08:49

## 2022-01-01 RX ADMIN — MICONAZOLE NITRATE: 2 POWDER TOPICAL at 09:57

## 2022-01-01 RX ADMIN — PRAMOXINE HYDROCHLORIDE: 10 LOTION TOPICAL at 16:31

## 2022-01-01 RX ADMIN — ENOXAPARIN SODIUM 40 MG: 40 INJECTION SUBCUTANEOUS at 06:46

## 2022-01-01 RX ADMIN — THERA TABS 1 TABLET: TAB at 17:56

## 2022-01-01 RX ADMIN — ASPIRIN 325 MG ORAL TABLET 325 MG: 325 PILL ORAL at 08:47

## 2022-01-01 RX ADMIN — QUETIAPINE 12.5 MG: 25 TABLET, FILM COATED ORAL at 22:03

## 2022-01-01 RX ADMIN — SERTRALINE HYDROCHLORIDE 50 MG: 50 TABLET ORAL at 09:15

## 2022-01-01 RX ADMIN — MICONAZOLE NITRATE: 2 POWDER TOPICAL at 21:26

## 2022-01-01 RX ADMIN — ENOXAPARIN SODIUM 40 MG: 40 INJECTION SUBCUTANEOUS at 20:39

## 2022-01-01 RX ADMIN — SENNOSIDES AND DOCUSATE SODIUM 1 TABLET: 8.6; 5 TABLET ORAL at 08:15

## 2022-01-01 RX ADMIN — QUETIAPINE 12.5 MG: 25 TABLET, FILM COATED ORAL at 05:16

## 2022-01-01 RX ADMIN — OXYCODONE HYDROCHLORIDE 5 MG: 5 TABLET ORAL at 01:15

## 2022-01-01 RX ADMIN — FUROSEMIDE 20 MG: 20 TABLET ORAL at 16:25

## 2022-01-01 RX ADMIN — ACETAMINOPHEN 975 MG: 325 TABLET ORAL at 18:13

## 2022-01-01 RX ADMIN — ARIPIPRAZOLE 10 MG: 5 TABLET ORAL at 19:16

## 2022-01-01 RX ADMIN — MORPHINE SULFATE 5 MG: 100 SOLUTION ORAL at 17:47

## 2022-01-01 RX ADMIN — OXYCODONE HYDROCHLORIDE 5 MG: 5 TABLET ORAL at 09:33

## 2022-01-01 RX ADMIN — THERA TABS 1 TABLET: TAB at 16:48

## 2022-01-01 RX ADMIN — DEXMEDETOMIDINE HYDROCHLORIDE 10 MCG: 200 INJECTION INTRAVENOUS at 09:39

## 2022-01-01 RX ADMIN — DICLOFENAC SODIUM 2 G: 10 GEL TOPICAL at 21:05

## 2022-01-01 RX ADMIN — OXYCODONE HYDROCHLORIDE 5 MG: 5 TABLET ORAL at 03:23

## 2022-01-01 RX ADMIN — Medication 1 CAPSULE: at 10:27

## 2022-01-01 RX ADMIN — ARIPIPRAZOLE 5 MG: 5 TABLET ORAL at 19:53

## 2022-01-01 RX ADMIN — MICONAZOLE NITRATE: 2 POWDER TOPICAL at 11:54

## 2022-01-01 RX ADMIN — POTASSIUM CHLORIDE 20 MEQ: 1.5 POWDER, FOR SOLUTION ORAL at 21:30

## 2022-01-01 RX ADMIN — ACETAMINOPHEN 975 MG: 325 TABLET ORAL at 09:43

## 2022-01-01 RX ADMIN — ACETAMINOPHEN 650 MG: 325 TABLET ORAL at 13:24

## 2022-01-01 RX ADMIN — ENOXAPARIN SODIUM 40 MG: 40 INJECTION SUBCUTANEOUS at 21:35

## 2022-01-01 RX ADMIN — ARIPIPRAZOLE 10 MG: 5 TABLET ORAL at 20:58

## 2022-01-01 RX ADMIN — ATORVASTATIN CALCIUM 40 MG: 40 TABLET, FILM COATED ORAL at 22:05

## 2022-01-01 RX ADMIN — OXYCODONE HYDROCHLORIDE 5 MG: 5 TABLET ORAL at 02:59

## 2022-01-01 RX ADMIN — ARIPIPRAZOLE 10 MG: 5 TABLET ORAL at 20:57

## 2022-01-01 RX ADMIN — POTASSIUM CHLORIDE 20 MEQ: 1.5 POWDER, FOR SOLUTION ORAL at 09:52

## 2022-01-01 RX ADMIN — POTASSIUM CHLORIDE 10 MEQ: 7.46 INJECTION, SOLUTION INTRAVENOUS at 18:05

## 2022-01-01 RX ADMIN — DICLOFENAC SODIUM 2 G: 10 GEL TOPICAL at 09:33

## 2022-01-01 RX ADMIN — SENNOSIDES AND DOCUSATE SODIUM 1 TABLET: 8.6; 5 TABLET ORAL at 22:00

## 2022-01-01 RX ADMIN — SODIUM PHOSPHATE, MONOBASIC, MONOHYDRATE 15 MMOL: 276; 142 INJECTION, SOLUTION INTRAVENOUS at 09:54

## 2022-01-01 RX ADMIN — POLYETHYLENE GLYCOL 3350 17 G: 17 POWDER, FOR SOLUTION ORAL at 08:45

## 2022-01-01 RX ADMIN — SERTRALINE HYDROCHLORIDE 50 MG: 50 TABLET ORAL at 10:28

## 2022-01-01 RX ADMIN — MICONAZOLE NITRATE: 2 POWDER TOPICAL at 08:01

## 2022-01-01 RX ADMIN — CEFTRIAXONE SODIUM 2 G: 2 INJECTION, POWDER, FOR SOLUTION INTRAMUSCULAR; INTRAVENOUS at 11:44

## 2022-01-01 RX ADMIN — FUROSEMIDE 20 MG: 20 TABLET ORAL at 09:01

## 2022-01-01 RX ADMIN — MAGNESIUM SULFATE HEPTAHYDRATE 4 G: 40 INJECTION, SOLUTION INTRAVENOUS at 16:43

## 2022-01-01 RX ADMIN — SERTRALINE HYDROCHLORIDE 50 MG: 50 TABLET ORAL at 09:14

## 2022-01-01 RX ADMIN — AMOXICILLIN AND CLAVULANATE POTASSIUM 1 TABLET: 875; 125 TABLET, FILM COATED ORAL at 21:55

## 2022-01-01 RX ADMIN — HYDROMORPHONE HYDROCHLORIDE 0.1 MG: 0.2 INJECTION, SOLUTION INTRAMUSCULAR; INTRAVENOUS; SUBCUTANEOUS at 22:40

## 2022-01-01 RX ADMIN — PRAMOXINE HYDROCHLORIDE: 10 LOTION TOPICAL at 09:30

## 2022-01-01 RX ADMIN — ENOXAPARIN SODIUM 40 MG: 40 INJECTION SUBCUTANEOUS at 09:49

## 2022-01-01 RX ADMIN — Medication 1 CAPSULE: at 21:00

## 2022-01-01 RX ADMIN — ARIPIPRAZOLE 10 MG: 5 TABLET ORAL at 21:25

## 2022-01-01 RX ADMIN — POTASSIUM CHLORIDE 10 MEQ: 7.46 INJECTION, SOLUTION INTRAVENOUS at 08:14

## 2022-01-01 RX ADMIN — SENNOSIDES AND DOCUSATE SODIUM 1 TABLET: 8.6; 5 TABLET ORAL at 09:13

## 2022-01-01 RX ADMIN — POTASSIUM CHLORIDE 20 MEQ: 1.5 POWDER, FOR SOLUTION ORAL at 08:56

## 2022-01-01 RX ADMIN — SERTRALINE HYDROCHLORIDE 50 MG: 50 TABLET ORAL at 09:36

## 2022-01-01 RX ADMIN — ENOXAPARIN SODIUM 40 MG: 40 INJECTION SUBCUTANEOUS at 12:19

## 2022-01-01 RX ADMIN — OXYCODONE HYDROCHLORIDE 5 MG: 5 TABLET ORAL at 17:43

## 2022-01-01 RX ADMIN — DICLOFENAC SODIUM 2 G: 10 GEL TOPICAL at 08:56

## 2022-01-01 RX ADMIN — OXYCODONE HYDROCHLORIDE 5 MG: 5 TABLET ORAL at 14:12

## 2022-01-01 RX ADMIN — MEDROXYPROGESTERONE ACETATE 10 MG: 10 TABLET ORAL at 17:43

## 2022-01-01 RX ADMIN — FUROSEMIDE 20 MG: 20 TABLET ORAL at 09:15

## 2022-01-01 RX ADMIN — ENOXAPARIN SODIUM 40 MG: 40 INJECTION SUBCUTANEOUS at 09:26

## 2022-01-01 RX ADMIN — ARIPIPRAZOLE 10 MG: 5 TABLET ORAL at 20:13

## 2022-01-01 RX ADMIN — OXYCODONE HYDROCHLORIDE 5 MG: 5 TABLET ORAL at 21:43

## 2022-01-01 RX ADMIN — SENNOSIDES AND DOCUSATE SODIUM 1 TABLET: 8.6; 5 TABLET ORAL at 09:36

## 2022-01-01 RX ADMIN — DICLOFENAC SODIUM 2 G: 10 GEL TOPICAL at 10:25

## 2022-01-01 RX ADMIN — MICONAZOLE NITRATE: 2 POWDER TOPICAL at 09:12

## 2022-01-01 RX ADMIN — MICONAZOLE NITRATE: 2 POWDER TOPICAL at 20:57

## 2022-01-01 RX ADMIN — SERTRALINE HYDROCHLORIDE 50 MG: 50 TABLET ORAL at 10:32

## 2022-01-01 RX ADMIN — ENOXAPARIN SODIUM 40 MG: 40 INJECTION SUBCUTANEOUS at 17:52

## 2022-01-01 RX ADMIN — ENOXAPARIN SODIUM 40 MG: 40 INJECTION SUBCUTANEOUS at 10:22

## 2022-01-01 RX ADMIN — ENOXAPARIN SODIUM 40 MG: 40 INJECTION SUBCUTANEOUS at 17:12

## 2022-01-01 RX ADMIN — DICLOFENAC SODIUM 2 G: 10 GEL TOPICAL at 17:39

## 2022-01-01 RX ADMIN — ATORVASTATIN CALCIUM 40 MG: 40 TABLET, FILM COATED ORAL at 20:00

## 2022-01-01 RX ADMIN — DICLOFENAC SODIUM 2 G: 10 GEL TOPICAL at 12:51

## 2022-01-01 RX ADMIN — Medication 12.5 MG: at 09:29

## 2022-01-01 RX ADMIN — DICLOFENAC SODIUM 2 G: 10 GEL TOPICAL at 10:18

## 2022-01-01 RX ADMIN — MICONAZOLE NITRATE: 2 POWDER TOPICAL at 08:22

## 2022-01-01 RX ADMIN — FUROSEMIDE 20 MG: 20 TABLET ORAL at 16:37

## 2022-01-01 RX ADMIN — PRAMOXINE HYDROCHLORIDE: 10 LOTION TOPICAL at 13:30

## 2022-01-01 RX ADMIN — CLINDAMYCIN HYDROCHLORIDE 300 MG: 300 CAPSULE ORAL at 06:36

## 2022-01-01 RX ADMIN — SENNOSIDES AND DOCUSATE SODIUM 1 TABLET: 8.6; 5 TABLET ORAL at 20:07

## 2022-01-01 RX ADMIN — ENOXAPARIN SODIUM 40 MG: 40 INJECTION SUBCUTANEOUS at 21:43

## 2022-01-01 RX ADMIN — FUROSEMIDE 40 MG: 40 TABLET ORAL at 09:37

## 2022-01-01 RX ADMIN — MICONAZOLE NITRATE: 2 POWDER TOPICAL at 08:54

## 2022-01-01 RX ADMIN — Medication 1 CAPSULE: at 10:15

## 2022-01-01 RX ADMIN — SENNOSIDES AND DOCUSATE SODIUM 1 TABLET: 8.6; 5 TABLET ORAL at 21:44

## 2022-01-01 RX ADMIN — POTASSIUM CHLORIDE 10 MEQ: 7.46 INJECTION, SOLUTION INTRAVENOUS at 10:49

## 2022-01-01 RX ADMIN — OXYCODONE HYDROCHLORIDE 5 MG: 5 TABLET ORAL at 15:08

## 2022-01-01 RX ADMIN — ACETAMINOPHEN 975 MG: 325 TABLET ORAL at 20:07

## 2022-01-01 RX ADMIN — OLANZAPINE 2.5 MG: 2.5 TABLET, FILM COATED ORAL at 21:06

## 2022-01-01 RX ADMIN — SERTRALINE HYDROCHLORIDE 50 MG: 50 TABLET ORAL at 08:21

## 2022-01-01 RX ADMIN — MICONAZOLE NITRATE: 2 POWDER TOPICAL at 11:34

## 2022-01-01 RX ADMIN — ACETAMINOPHEN 975 MG: 325 TABLET ORAL at 08:49

## 2022-01-01 RX ADMIN — FUROSEMIDE 40 MG: 40 TABLET ORAL at 08:47

## 2022-01-01 RX ADMIN — HEPARIN SODIUM 2000 UNITS: 200 INJECTION, SOLUTION INTRAVENOUS at 15:38

## 2022-01-01 RX ADMIN — CLINDAMYCIN HYDROCHLORIDE 300 MG: 300 CAPSULE ORAL at 06:05

## 2022-01-01 RX ADMIN — MICONAZOLE NITRATE: 2 POWDER TOPICAL at 13:29

## 2022-01-01 RX ADMIN — SPIRONOLACTONE 25 MG: 25 TABLET ORAL at 09:35

## 2022-01-01 RX ADMIN — ASPIRIN 325 MG ORAL TABLET 325 MG: 325 PILL ORAL at 09:57

## 2022-01-01 RX ADMIN — POTASSIUM CHLORIDE 10 MEQ: 7.46 INJECTION, SOLUTION INTRAVENOUS at 13:40

## 2022-01-01 RX ADMIN — OXYCODONE HYDROCHLORIDE 5 MG: 5 TABLET ORAL at 18:46

## 2022-01-01 RX ADMIN — SERTRALINE HYDROCHLORIDE 50 MG: 50 TABLET ORAL at 08:00

## 2022-01-01 RX ADMIN — HYDROXYZINE HYDROCHLORIDE 25 MG: 25 TABLET, FILM COATED ORAL at 19:50

## 2022-01-01 RX ADMIN — OXYCODONE HYDROCHLORIDE 5 MG: 5 TABLET ORAL at 02:25

## 2022-01-01 RX ADMIN — ASPIRIN 325 MG ORAL TABLET 325 MG: 325 PILL ORAL at 10:15

## 2022-01-01 RX ADMIN — MICONAZOLE NITRATE: 2 POWDER TOPICAL at 08:42

## 2022-01-01 RX ADMIN — ACETAMINOPHEN 975 MG: 325 TABLET ORAL at 11:30

## 2022-01-01 RX ADMIN — FUROSEMIDE 20 MG: 20 TABLET ORAL at 16:21

## 2022-01-01 RX ADMIN — ASPIRIN 325 MG ORAL TABLET 325 MG: 325 PILL ORAL at 09:19

## 2022-01-01 RX ADMIN — ASPIRIN 325 MG ORAL TABLET 325 MG: 325 PILL ORAL at 07:51

## 2022-01-01 RX ADMIN — ASPIRIN 325 MG ORAL TABLET 325 MG: 325 PILL ORAL at 09:05

## 2022-01-01 RX ADMIN — OLANZAPINE 2.5 MG: 2.5 TABLET, FILM COATED ORAL at 21:05

## 2022-01-01 RX ADMIN — POTASSIUM CHLORIDE 40 MEQ: 1500 TABLET, EXTENDED RELEASE ORAL at 14:17

## 2022-01-01 RX ADMIN — ACETAMINOPHEN 975 MG: 325 TABLET ORAL at 18:48

## 2022-01-01 RX ADMIN — ATORVASTATIN CALCIUM 40 MG: 40 TABLET, FILM COATED ORAL at 22:15

## 2022-01-01 RX ADMIN — SERTRALINE HYDROCHLORIDE 50 MG: 50 TABLET ORAL at 08:56

## 2022-01-01 RX ADMIN — ENOXAPARIN SODIUM 40 MG: 40 INJECTION SUBCUTANEOUS at 20:55

## 2022-01-01 RX ADMIN — MICONAZOLE NITRATE: 2 POWDER TOPICAL at 20:26

## 2022-01-01 RX ADMIN — LISINOPRIL 5 MG: 5 TABLET ORAL at 08:56

## 2022-01-01 RX ADMIN — DICLOFENAC SODIUM 2 G: 10 GEL TOPICAL at 10:14

## 2022-01-01 RX ADMIN — FUROSEMIDE 20 MG: 20 TABLET ORAL at 08:21

## 2022-01-01 RX ADMIN — MICONAZOLE NITRATE: 2 POWDER TOPICAL at 09:26

## 2022-01-01 RX ADMIN — ENOXAPARIN SODIUM 40 MG: 40 INJECTION SUBCUTANEOUS at 08:50

## 2022-01-01 RX ADMIN — SODIUM PHOSPHATE, MONOBASIC, MONOHYDRATE 15 MMOL: 276; 142 INJECTION, SOLUTION INTRAVENOUS at 09:20

## 2022-01-01 RX ADMIN — POTASSIUM CHLORIDE 20 MEQ: 1500 TABLET, EXTENDED RELEASE ORAL at 09:43

## 2022-01-01 RX ADMIN — OLANZAPINE 5 MG: 5 TABLET, ORALLY DISINTEGRATING ORAL at 01:40

## 2022-01-01 RX ADMIN — SERTRALINE HYDROCHLORIDE 50 MG: 50 TABLET ORAL at 10:20

## 2022-01-01 RX ADMIN — MICONAZOLE NITRATE: 2 POWDER TOPICAL at 09:24

## 2022-01-01 RX ADMIN — ARIPIPRAZOLE 10 MG: 5 TABLET ORAL at 20:48

## 2022-01-01 RX ADMIN — DICLOFENAC SODIUM 2 G: 10 GEL TOPICAL at 17:18

## 2022-01-01 RX ADMIN — PIPERACILLIN AND TAZOBACTAM 3.38 G: 3; .375 INJECTION, POWDER, FOR SOLUTION INTRAVENOUS at 13:36

## 2022-01-01 RX ADMIN — SENNOSIDES AND DOCUSATE SODIUM 1 TABLET: 8.6; 5 TABLET ORAL at 20:32

## 2022-01-01 RX ADMIN — SPIRONOLACTONE 25 MG: 25 TABLET ORAL at 08:51

## 2022-01-01 RX ADMIN — Medication 0.12 MCG/KG/MIN: at 09:46

## 2022-01-01 RX ADMIN — OXYCODONE HYDROCHLORIDE 2.5 MG: 5 TABLET ORAL at 21:29

## 2022-01-01 RX ADMIN — Medication 12.5 MG: at 10:12

## 2022-01-01 RX ADMIN — SODIUM CHLORIDE, SODIUM LACTATE, POTASSIUM CHLORIDE, CALCIUM CHLORIDE AND DEXTROSE MONOHYDRATE: 5; 600; 310; 30; 20 INJECTION, SOLUTION INTRAVENOUS at 00:35

## 2022-01-01 RX ADMIN — HYDROXYZINE HYDROCHLORIDE 50 MG: 25 TABLET ORAL at 01:35

## 2022-01-01 RX ADMIN — MICONAZOLE NITRATE: 2 POWDER TOPICAL at 09:09

## 2022-01-01 RX ADMIN — ASPIRIN 325 MG ORAL TABLET 325 MG: 325 PILL ORAL at 09:28

## 2022-01-01 RX ADMIN — FUROSEMIDE 20 MG: 20 TABLET ORAL at 17:22

## 2022-01-01 RX ADMIN — ASPIRIN 325 MG ORAL TABLET 325 MG: 325 PILL ORAL at 08:38

## 2022-01-01 RX ADMIN — MIDAZOLAM 2 MG: 1 INJECTION INTRAMUSCULAR; INTRAVENOUS at 14:26

## 2022-01-01 RX ADMIN — ARIPIPRAZOLE 5 MG: 5 TABLET ORAL at 20:27

## 2022-01-01 RX ADMIN — MICONAZOLE NITRATE: 2 POWDER TOPICAL at 21:44

## 2022-01-01 RX ADMIN — HYDROMORPHONE HYDROCHLORIDE 0.2 MG: 0.2 INJECTION, SOLUTION INTRAMUSCULAR; INTRAVENOUS; SUBCUTANEOUS at 09:23

## 2022-01-01 RX ADMIN — MICONAZOLE NITRATE: 2 POWDER TOPICAL at 08:51

## 2022-01-01 RX ADMIN — ATORVASTATIN CALCIUM 40 MG: 40 TABLET, FILM COATED ORAL at 21:07

## 2022-01-01 RX ADMIN — ASPIRIN 325 MG ORAL TABLET 325 MG: 325 PILL ORAL at 08:35

## 2022-01-01 RX ADMIN — MULTIPLE VITAMINS W/ MINERALS TAB 1 TABLET: TAB at 08:35

## 2022-01-01 RX ADMIN — POTASSIUM CHLORIDE 10 MEQ: 7.46 INJECTION, SOLUTION INTRAVENOUS at 16:04

## 2022-01-01 RX ADMIN — OXYCODONE HYDROCHLORIDE 5 MG: 5 TABLET ORAL at 01:35

## 2022-01-01 RX ADMIN — SERTRALINE HYDROCHLORIDE 50 MG: 50 TABLET ORAL at 08:33

## 2022-01-01 RX ADMIN — CLINDAMYCIN PHOSPHATE 900 MG: 900 INJECTION, SOLUTION INTRAVENOUS at 05:14

## 2022-01-01 RX ADMIN — POLYETHYLENE GLYCOL 3350 17 G: 17 POWDER, FOR SOLUTION ORAL at 10:27

## 2022-01-01 RX ADMIN — FUROSEMIDE 20 MG: 20 TABLET ORAL at 16:11

## 2022-01-01 RX ADMIN — Medication 1 CAPSULE: at 09:00

## 2022-01-01 RX ADMIN — OLANZAPINE 2.5 MG: 2.5 TABLET, FILM COATED ORAL at 21:14

## 2022-01-01 RX ADMIN — POTASSIUM CHLORIDE 10 MEQ: 7.46 INJECTION, SOLUTION INTRAVENOUS at 16:34

## 2022-01-01 RX ADMIN — PRAMOXINE HYDROCHLORIDE: 10 LOTION TOPICAL at 21:25

## 2022-01-01 RX ADMIN — ENOXAPARIN SODIUM 40 MG: 40 INJECTION SUBCUTANEOUS at 06:00

## 2022-01-01 RX ADMIN — SENNOSIDES AND DOCUSATE SODIUM 2 TABLET: 8.6; 5 TABLET ORAL at 21:01

## 2022-01-01 RX ADMIN — CARBIDOPA AND LEVODOPA 2.5 MG: 50; 200 TABLET, EXTENDED RELEASE ORAL at 09:29

## 2022-01-01 RX ADMIN — ASPIRIN 325 MG ORAL TABLET 325 MG: 325 PILL ORAL at 09:37

## 2022-01-01 RX ADMIN — ATORVASTATIN CALCIUM 40 MG: 40 TABLET, FILM COATED ORAL at 21:44

## 2022-01-01 RX ADMIN — ATORVASTATIN CALCIUM 40 MG: 40 TABLET, FILM COATED ORAL at 20:41

## 2022-01-01 RX ADMIN — SENNOSIDES AND DOCUSATE SODIUM 1 TABLET: 8.6; 5 TABLET ORAL at 09:07

## 2022-01-01 RX ADMIN — ARIPIPRAZOLE 10 MG: 5 TABLET ORAL at 21:16

## 2022-01-01 RX ADMIN — POTASSIUM CHLORIDE 20 MEQ: 1.5 POWDER, FOR SOLUTION ORAL at 09:53

## 2022-01-01 RX ADMIN — MICONAZOLE NITRATE: 2 POWDER TOPICAL at 16:08

## 2022-01-01 RX ADMIN — Medication 12.5 MG: at 10:32

## 2022-01-01 RX ADMIN — QUETIAPINE 12.5 MG: 25 TABLET, FILM COATED ORAL at 03:24

## 2022-01-01 RX ADMIN — POTASSIUM CHLORIDE 20 MEQ: 1.5 POWDER, FOR SOLUTION ORAL at 10:31

## 2022-01-01 RX ADMIN — DICLOFENAC SODIUM 2 G: 10 GEL TOPICAL at 09:06

## 2022-01-01 RX ADMIN — MULTIPLE VITAMINS W/ MINERALS TAB 1 TABLET: TAB at 08:58

## 2022-01-01 RX ADMIN — FUROSEMIDE 20 MG: 20 TABLET ORAL at 11:07

## 2022-01-01 RX ADMIN — POTASSIUM CHLORIDE 40 MEQ: 20 SOLUTION ORAL at 10:41

## 2022-01-01 RX ADMIN — SENNOSIDES AND DOCUSATE SODIUM 2 TABLET: 8.6; 5 TABLET ORAL at 21:00

## 2022-01-01 RX ADMIN — FUROSEMIDE 20 MG: 20 TABLET ORAL at 16:32

## 2022-01-01 RX ADMIN — ENOXAPARIN SODIUM 40 MG: 40 INJECTION SUBCUTANEOUS at 09:18

## 2022-01-01 RX ADMIN — CEFTRIAXONE SODIUM 2 G: 2 INJECTION, POWDER, FOR SOLUTION INTRAMUSCULAR; INTRAVENOUS at 12:07

## 2022-01-01 RX ADMIN — FUROSEMIDE 20 MG: 20 TABLET ORAL at 09:31

## 2022-01-01 RX ADMIN — POTASSIUM CHLORIDE 40 MEQ: 1.5 POWDER, FOR SOLUTION ORAL at 13:24

## 2022-01-01 RX ADMIN — LISINOPRIL 10 MG: 10 TABLET ORAL at 10:17

## 2022-01-01 RX ADMIN — SENNOSIDES AND DOCUSATE SODIUM 1 TABLET: 8.6; 5 TABLET ORAL at 09:51

## 2022-01-01 RX ADMIN — POTASSIUM CHLORIDE 20 MEQ: 1.5 POWDER, FOR SOLUTION ORAL at 08:25

## 2022-01-01 RX ADMIN — MICONAZOLE NITRATE: 2 POWDER TOPICAL at 09:20

## 2022-01-01 RX ADMIN — Medication 12.5 MG: at 08:32

## 2022-01-01 RX ADMIN — OLANZAPINE 2.5 MG: 2.5 TABLET, FILM COATED ORAL at 21:23

## 2022-01-01 RX ADMIN — ARIPIPRAZOLE 10 MG: 5 TABLET ORAL at 21:26

## 2022-01-01 RX ADMIN — ATORVASTATIN CALCIUM 40 MG: 40 TABLET, FILM COATED ORAL at 21:45

## 2022-01-01 RX ADMIN — POTASSIUM CHLORIDE 20 MEQ: 1.5 POWDER, FOR SOLUTION ORAL at 17:19

## 2022-01-01 RX ADMIN — IOPAMIDOL 48 ML: 612 INJECTION, SOLUTION INTRAVENOUS at 16:33

## 2022-01-01 RX ADMIN — ARIPIPRAZOLE 10 MG: 5 TABLET ORAL at 19:24

## 2022-01-01 RX ADMIN — ATORVASTATIN CALCIUM 40 MG: 40 TABLET, FILM COATED ORAL at 20:13

## 2022-01-01 RX ADMIN — PIPERACILLIN AND TAZOBACTAM 3.38 G: 3; .375 INJECTION, POWDER, FOR SOLUTION INTRAVENOUS at 09:08

## 2022-01-01 RX ADMIN — ASPIRIN 325 MG ORAL TABLET 325 MG: 325 PILL ORAL at 09:15

## 2022-01-01 RX ADMIN — ATORVASTATIN CALCIUM 40 MG: 40 TABLET, FILM COATED ORAL at 20:28

## 2022-01-01 RX ADMIN — SODIUM PHOSPHATE, MONOBASIC, MONOHYDRATE 15 MMOL: 276; 142 INJECTION, SOLUTION INTRAVENOUS at 10:28

## 2022-01-01 RX ADMIN — BISACODYL 10 MG: 10 SUPPOSITORY RECTAL at 10:47

## 2022-01-01 RX ADMIN — ASPIRIN 325 MG ORAL TABLET 325 MG: 325 PILL ORAL at 08:21

## 2022-01-01 RX ADMIN — ENOXAPARIN SODIUM 40 MG: 40 INJECTION SUBCUTANEOUS at 20:43

## 2022-01-01 RX ADMIN — ARIPIPRAZOLE 10 MG: 5 TABLET ORAL at 21:04

## 2022-01-01 RX ADMIN — CLINDAMYCIN PHOSPHATE 900 MG: 900 INJECTION, SOLUTION INTRAVENOUS at 13:14

## 2022-01-01 RX ADMIN — POLYETHYLENE GLYCOL 3350 17 G: 17 POWDER, FOR SOLUTION ORAL at 10:09

## 2022-01-01 RX ADMIN — OXYCODONE HYDROCHLORIDE 5 MG: 5 TABLET ORAL at 17:19

## 2022-01-01 RX ADMIN — SENNOSIDES AND DOCUSATE SODIUM 1 TABLET: 8.6; 5 TABLET ORAL at 21:20

## 2022-01-01 RX ADMIN — ACETAMINOPHEN 975 MG: 325 TABLET ORAL at 17:57

## 2022-01-01 RX ADMIN — ARIPIPRAZOLE 10 MG: 5 TABLET ORAL at 21:50

## 2022-01-01 RX ADMIN — PRAMOXINE HYDROCHLORIDE: 10 LOTION TOPICAL at 17:14

## 2022-01-01 RX ADMIN — MORPHINE SULFATE 10 MG: 100 SOLUTION ORAL at 22:09

## 2022-01-01 RX ADMIN — PRAMOXINE HYDROCHLORIDE: 10 LOTION TOPICAL at 22:41

## 2022-01-01 RX ADMIN — ARIPIPRAZOLE 10 MG: 5 TABLET ORAL at 21:20

## 2022-01-01 RX ADMIN — OXYCODONE HYDROCHLORIDE 5 MG: 5 TABLET ORAL at 08:53

## 2022-01-01 RX ADMIN — ARIPIPRAZOLE 10 MG: 5 TABLET ORAL at 19:59

## 2022-01-01 RX ADMIN — Medication 1 CAPSULE: at 20:39

## 2022-01-01 RX ADMIN — ENOXAPARIN SODIUM 40 MG: 40 INJECTION SUBCUTANEOUS at 10:01

## 2022-01-01 RX ADMIN — LORAZEPAM 0.5 MG: 2 INJECTION INTRAMUSCULAR at 23:28

## 2022-01-01 RX ADMIN — MICONAZOLE NITRATE: 2 POWDER TOPICAL at 09:01

## 2022-01-01 RX ADMIN — ACETAMINOPHEN 975 MG: 325 TABLET ORAL at 03:17

## 2022-01-01 RX ADMIN — THERA TABS 1 TABLET: TAB at 17:41

## 2022-01-01 RX ADMIN — MICONAZOLE NITRATE: 2 POWDER TOPICAL at 09:53

## 2022-01-01 RX ADMIN — ARIPIPRAZOLE 10 MG: 5 TABLET ORAL at 21:30

## 2022-01-01 RX ADMIN — PRAMOXINE HYDROCHLORIDE: 10 LOTION TOPICAL at 22:00

## 2022-01-01 RX ADMIN — THERA TABS 1 TABLET: TAB at 16:35

## 2022-01-01 RX ADMIN — CLINDAMYCIN HYDROCHLORIDE 300 MG: 300 CAPSULE ORAL at 06:39

## 2022-01-01 RX ADMIN — ENOXAPARIN SODIUM 40 MG: 40 INJECTION SUBCUTANEOUS at 17:39

## 2022-01-01 RX ADMIN — HYDROCORTISONE SODIUM SUCCINATE 100 MG: 100 INJECTION, POWDER, FOR SOLUTION INTRAMUSCULAR; INTRAVENOUS at 15:18

## 2022-01-01 RX ADMIN — ATORVASTATIN CALCIUM 40 MG: 40 TABLET, FILM COATED ORAL at 20:35

## 2022-01-01 RX ADMIN — POTASSIUM CHLORIDE 40 MEQ: 20 SOLUTION ORAL at 21:27

## 2022-01-01 RX ADMIN — SENNOSIDES AND DOCUSATE SODIUM 1 TABLET: 8.6; 5 TABLET ORAL at 11:41

## 2022-01-01 RX ADMIN — Medication 12.5 MG: at 09:33

## 2022-01-01 RX ADMIN — OLANZAPINE 2.5 MG: 2.5 TABLET, FILM COATED ORAL at 21:25

## 2022-01-01 RX ADMIN — ENOXAPARIN SODIUM 40 MG: 40 INJECTION SUBCUTANEOUS at 20:20

## 2022-01-01 RX ADMIN — ASPIRIN 325 MG ORAL TABLET 325 MG: 325 PILL ORAL at 09:01

## 2022-01-01 RX ADMIN — ARIPIPRAZOLE 10 MG: 5 TABLET ORAL at 21:36

## 2022-01-01 RX ADMIN — ACETAMINOPHEN 975 MG: 325 TABLET ORAL at 01:49

## 2022-01-01 RX ADMIN — OLANZAPINE 2.5 MG: 2.5 TABLET, FILM COATED ORAL at 21:31

## 2022-01-01 RX ADMIN — SENNOSIDES AND DOCUSATE SODIUM 1 TABLET: 8.6; 5 TABLET ORAL at 20:39

## 2022-01-01 RX ADMIN — OXYCODONE HYDROCHLORIDE 5 MG: 5 TABLET ORAL at 09:03

## 2022-01-01 RX ADMIN — PROPOFOL 20 MCG/KG/MIN: 10 INJECTION, EMULSION INTRAVENOUS at 12:28

## 2022-01-01 RX ADMIN — DICLOFENAC SODIUM 2 G: 10 GEL TOPICAL at 14:47

## 2022-01-01 RX ADMIN — DICLOFENAC SODIUM 2 G: 10 GEL TOPICAL at 17:41

## 2022-01-01 RX ADMIN — SERTRALINE HYDROCHLORIDE 50 MG: 50 TABLET ORAL at 08:51

## 2022-01-01 RX ADMIN — CLINDAMYCIN HYDROCHLORIDE 300 MG: 300 CAPSULE ORAL at 14:12

## 2022-01-01 RX ADMIN — POTASSIUM CHLORIDE 20 MEQ: 29.8 INJECTION, SOLUTION INTRAVENOUS at 10:00

## 2022-01-01 RX ADMIN — FUROSEMIDE 20 MG: 20 TABLET ORAL at 09:13

## 2022-01-01 RX ADMIN — ENOXAPARIN SODIUM 40 MG: 40 INJECTION SUBCUTANEOUS at 08:59

## 2022-01-01 RX ADMIN — SODIUM PHOSPHATE, MONOBASIC, MONOHYDRATE 15 MMOL: 276; 142 INJECTION, SOLUTION INTRAVENOUS at 08:31

## 2022-01-01 RX ADMIN — POTASSIUM CHLORIDE 20 MEQ: 1.5 POWDER, FOR SOLUTION ORAL at 21:36

## 2022-01-01 RX ADMIN — PRAMOXINE HYDROCHLORIDE: 10 LOTION TOPICAL at 18:17

## 2022-01-01 RX ADMIN — ARIPIPRAZOLE 10 MG: 5 TABLET ORAL at 21:44

## 2022-01-01 RX ADMIN — MICONAZOLE NITRATE: 2 POWDER TOPICAL at 10:18

## 2022-01-01 RX ADMIN — MICONAZOLE NITRATE: 2 POWDER TOPICAL at 22:27

## 2022-01-01 RX ADMIN — HYDROCORTISONE SODIUM SUCCINATE 50 MG: 100 INJECTION, POWDER, FOR SOLUTION INTRAMUSCULAR; INTRAVENOUS at 13:23

## 2022-01-01 RX ADMIN — MICONAZOLE NITRATE: 2 POWDER TOPICAL at 21:18

## 2022-01-01 RX ADMIN — FUROSEMIDE 20 MG: 20 TABLET ORAL at 09:50

## 2022-01-01 RX ADMIN — DICLOFENAC SODIUM 2 G: 10 GEL TOPICAL at 20:24

## 2022-01-01 RX ADMIN — SERTRALINE HYDROCHLORIDE 50 MG: 50 TABLET ORAL at 09:19

## 2022-01-01 RX ADMIN — DICLOFENAC SODIUM 2 G: 10 GEL TOPICAL at 12:00

## 2022-01-01 RX ADMIN — MICONAZOLE NITRATE: 2 POWDER TOPICAL at 21:37

## 2022-01-01 RX ADMIN — SENNOSIDES AND DOCUSATE SODIUM 1 TABLET: 8.6; 5 TABLET ORAL at 20:20

## 2022-01-01 RX ADMIN — FUROSEMIDE 20 MG: 20 TABLET ORAL at 18:13

## 2022-01-01 RX ADMIN — LEVOFLOXACIN 750 MG: 750 TABLET, FILM COATED ORAL at 08:01

## 2022-01-01 RX ADMIN — DICLOFENAC SODIUM 2 G: 10 GEL TOPICAL at 17:56

## 2022-01-01 RX ADMIN — Medication 1 CAPSULE: at 08:52

## 2022-01-01 RX ADMIN — ONDANSETRON 4 MG: 2 INJECTION INTRAMUSCULAR; INTRAVENOUS at 19:31

## 2022-01-01 RX ADMIN — OXYCODONE HYDROCHLORIDE 5 MG: 5 TABLET ORAL at 19:45

## 2022-01-01 RX ADMIN — FUROSEMIDE 20 MG: 20 TABLET ORAL at 15:08

## 2022-01-01 RX ADMIN — MICONAZOLE NITRATE: 2 POWDER TOPICAL at 23:20

## 2022-01-01 RX ADMIN — PRAMOXINE HYDROCHLORIDE: 10 LOTION TOPICAL at 16:06

## 2022-01-01 RX ADMIN — FUROSEMIDE 20 MG: 20 TABLET ORAL at 16:41

## 2022-01-01 RX ADMIN — ATORVASTATIN CALCIUM 40 MG: 40 TABLET, FILM COATED ORAL at 20:39

## 2022-01-01 RX ADMIN — PRAMOXINE HYDROCHLORIDE: 10 LOTION TOPICAL at 08:56

## 2022-01-01 RX ADMIN — QUETIAPINE FUMARATE 25 MG: 25 TABLET ORAL at 21:15

## 2022-01-01 RX ADMIN — SENNOSIDES AND DOCUSATE SODIUM 1 TABLET: 8.6; 5 TABLET ORAL at 08:21

## 2022-01-01 RX ADMIN — DICLOFENAC SODIUM 2 G: 10 GEL TOPICAL at 23:26

## 2022-01-01 RX ADMIN — SERTRALINE HYDROCHLORIDE 50 MG: 50 TABLET ORAL at 08:44

## 2022-01-01 RX ADMIN — SODIUM CHLORIDE, SODIUM LACTATE, POTASSIUM CHLORIDE, CALCIUM CHLORIDE AND DEXTROSE MONOHYDRATE: 5; 600; 310; 30; 20 INJECTION, SOLUTION INTRAVENOUS at 12:39

## 2022-01-01 RX ADMIN — POLYETHYLENE GLYCOL 3350 17 G: 17 POWDER, FOR SOLUTION ORAL at 09:07

## 2022-01-01 RX ADMIN — FUROSEMIDE 20 MG: 20 TABLET ORAL at 09:52

## 2022-01-01 RX ADMIN — ACETAMINOPHEN 650 MG: 325 TABLET ORAL at 08:43

## 2022-01-01 RX ADMIN — MICONAZOLE NITRATE: 2 POWDER TOPICAL at 22:40

## 2022-01-01 RX ADMIN — CLINDAMYCIN HYDROCHLORIDE 300 MG: 300 CAPSULE ORAL at 18:15

## 2022-01-01 RX ADMIN — ARIPIPRAZOLE 10 MG: 5 TABLET ORAL at 21:59

## 2022-01-01 RX ADMIN — LORAZEPAM 0.25 MG: 0.5 TABLET ORAL at 14:06

## 2022-01-01 RX ADMIN — MICONAZOLE NITRATE: 2 POWDER TOPICAL at 09:31

## 2022-01-01 RX ADMIN — THERA TABS 1 TABLET: TAB at 18:29

## 2022-01-01 RX ADMIN — DICLOFENAC SODIUM 2 G: 10 GEL TOPICAL at 22:45

## 2022-01-01 RX ADMIN — DICLOFENAC SODIUM 2 G: 10 GEL TOPICAL at 17:20

## 2022-01-01 RX ADMIN — MICONAZOLE NITRATE: 2 POWDER TOPICAL at 21:53

## 2022-01-01 RX ADMIN — OLANZAPINE 2.5 MG: 2.5 TABLET, FILM COATED ORAL at 21:38

## 2022-01-01 RX ADMIN — DICLOFENAC SODIUM 2 G: 10 GEL TOPICAL at 12:26

## 2022-01-01 RX ADMIN — MICONAZOLE NITRATE: 2 POWDER TOPICAL at 21:17

## 2022-01-01 RX ADMIN — SERTRALINE HYDROCHLORIDE 50 MG: 50 TABLET ORAL at 11:07

## 2022-01-01 RX ADMIN — ATORVASTATIN CALCIUM 40 MG: 40 TABLET, FILM COATED ORAL at 20:46

## 2022-01-01 RX ADMIN — DICLOFENAC SODIUM 2 G: 10 GEL TOPICAL at 17:19

## 2022-01-01 RX ADMIN — FUROSEMIDE 40 MG: 40 TABLET ORAL at 10:16

## 2022-01-01 RX ADMIN — PRAMOXINE HYDROCHLORIDE: 10 LOTION TOPICAL at 18:24

## 2022-01-01 RX ADMIN — ACETAMINOPHEN 975 MG: 325 TABLET ORAL at 15:01

## 2022-01-01 RX ADMIN — PRAMOXINE HYDROCHLORIDE: 10 LOTION TOPICAL at 17:20

## 2022-01-01 RX ADMIN — SPIRONOLACTONE 25 MG: 25 TABLET ORAL at 08:15

## 2022-01-01 RX ADMIN — POLYETHYLENE GLYCOL 3350 17 G: 17 POWDER, FOR SOLUTION ORAL at 09:55

## 2022-01-01 RX ADMIN — POTASSIUM CHLORIDE 20 MEQ: 1.5 POWDER, FOR SOLUTION ORAL at 10:26

## 2022-01-01 RX ADMIN — SENNOSIDES AND DOCUSATE SODIUM 2 TABLET: 8.6; 5 TABLET ORAL at 21:05

## 2022-01-01 RX ADMIN — Medication 0.1 MCG/KG/MIN: at 22:35

## 2022-01-01 RX ADMIN — DICLOFENAC SODIUM 2 G: 10 GEL TOPICAL at 13:25

## 2022-01-01 RX ADMIN — HYDROCORTISONE SODIUM SUCCINATE 100 MG: 100 INJECTION, POWDER, FOR SOLUTION INTRAMUSCULAR; INTRAVENOUS at 01:35

## 2022-01-01 RX ADMIN — ASPIRIN 325 MG ORAL TABLET 325 MG: 325 PILL ORAL at 09:41

## 2022-01-01 RX ADMIN — ARIPIPRAZOLE 10 MG: 5 TABLET ORAL at 20:20

## 2022-01-01 RX ADMIN — OLANZAPINE 5 MG: 5 TABLET, ORALLY DISINTEGRATING ORAL at 20:24

## 2022-01-01 RX ADMIN — DICLOFENAC SODIUM 2 G: 10 GEL TOPICAL at 18:27

## 2022-01-01 RX ADMIN — ENOXAPARIN SODIUM 40 MG: 40 INJECTION SUBCUTANEOUS at 10:31

## 2022-01-01 RX ADMIN — MICONAZOLE NITRATE: 2 POWDER TOPICAL at 09:49

## 2022-01-01 RX ADMIN — QUETIAPINE 12.5 MG: 25 TABLET, FILM COATED ORAL at 16:22

## 2022-01-01 RX ADMIN — SENNOSIDES AND DOCUSATE SODIUM 1 TABLET: 8.6; 5 TABLET ORAL at 19:23

## 2022-01-01 RX ADMIN — ENOXAPARIN SODIUM 40 MG: 40 INJECTION SUBCUTANEOUS at 00:42

## 2022-01-01 RX ADMIN — Medication 1 CAPSULE: at 09:21

## 2022-01-01 RX ADMIN — ACETAMINOPHEN 975 MG: 325 TABLET ORAL at 09:09

## 2022-01-01 RX ADMIN — SENNOSIDES AND DOCUSATE SODIUM 2 TABLET: 8.6; 5 TABLET ORAL at 09:35

## 2022-01-01 RX ADMIN — POLYETHYLENE GLYCOL 3350 17 G: 17 POWDER, FOR SOLUTION ORAL at 09:32

## 2022-01-01 RX ADMIN — LISINOPRIL 10 MG: 10 TABLET ORAL at 09:57

## 2022-01-01 RX ADMIN — MICONAZOLE NITRATE: 2 POWDER TOPICAL at 07:54

## 2022-01-01 RX ADMIN — CEFTRIAXONE SODIUM 2 G: 2 INJECTION, POWDER, FOR SOLUTION INTRAMUSCULAR; INTRAVENOUS at 11:43

## 2022-01-01 RX ADMIN — POTASSIUM CHLORIDE 20 MEQ: 20 SOLUTION ORAL at 10:09

## 2022-01-01 RX ADMIN — ENOXAPARIN SODIUM 40 MG: 40 INJECTION SUBCUTANEOUS at 08:10

## 2022-01-01 RX ADMIN — DICLOFENAC SODIUM 2 G: 10 GEL TOPICAL at 13:01

## 2022-01-01 RX ADMIN — SODIUM CHLORIDE: 9 INJECTION, SOLUTION INTRAVENOUS at 08:56

## 2022-01-01 RX ADMIN — Medication 1 CAPSULE: at 20:36

## 2022-01-01 RX ADMIN — ACETAMINOPHEN 975 MG: 325 TABLET ORAL at 00:11

## 2022-01-01 RX ADMIN — SENNOSIDES AND DOCUSATE SODIUM 1 TABLET: 8.6; 5 TABLET ORAL at 09:01

## 2022-01-01 RX ADMIN — OLANZAPINE 2.5 MG: 2.5 TABLET, FILM COATED ORAL at 21:19

## 2022-01-01 RX ADMIN — ATORVASTATIN CALCIUM 40 MG: 40 TABLET, FILM COATED ORAL at 21:30

## 2022-01-01 RX ADMIN — LISINOPRIL 10 MG: 10 TABLET ORAL at 10:16

## 2022-01-01 RX ADMIN — SODIUM PHOSPHATE, MONOBASIC, MONOHYDRATE 15 MMOL: 276; 142 INJECTION, SOLUTION INTRAVENOUS at 07:02

## 2022-01-01 RX ADMIN — ATORVASTATIN CALCIUM 40 MG: 40 TABLET, FILM COATED ORAL at 21:13

## 2022-01-01 RX ADMIN — SENNOSIDES AND DOCUSATE SODIUM 1 TABLET: 8.6; 5 TABLET ORAL at 09:31

## 2022-01-01 RX ADMIN — LORAZEPAM 0.5 MG: 0.5 TABLET ORAL at 11:30

## 2022-01-01 RX ADMIN — ATORVASTATIN CALCIUM 40 MG: 40 TABLET, FILM COATED ORAL at 20:50

## 2022-01-01 RX ADMIN — ENOXAPARIN SODIUM 40 MG: 40 INJECTION SUBCUTANEOUS at 01:22

## 2022-01-01 RX ADMIN — OXYCODONE HYDROCHLORIDE 5 MG: 5 TABLET ORAL at 21:14

## 2022-01-01 RX ADMIN — OLANZAPINE 2.5 MG: 2.5 TABLET, FILM COATED ORAL at 21:00

## 2022-01-01 RX ADMIN — ONDANSETRON 4 MG: 2 INJECTION INTRAMUSCULAR; INTRAVENOUS at 12:10

## 2022-01-01 RX ADMIN — FUROSEMIDE 20 MG: 20 TABLET ORAL at 17:16

## 2022-01-01 RX ADMIN — FUROSEMIDE 20 MG: 20 TABLET ORAL at 10:10

## 2022-01-01 RX ADMIN — Medication 1 CAPSULE: at 21:34

## 2022-01-01 RX ADMIN — OXYCODONE HYDROCHLORIDE 2.5 MG: 5 TABLET ORAL at 23:26

## 2022-01-01 RX ADMIN — POTASSIUM & SODIUM PHOSPHATES POWDER PACK 280-160-250 MG 1 PACKET: 280-160-250 PACK at 12:01

## 2022-01-01 RX ADMIN — QUETIAPINE FUMARATE 25 MG: 25 TABLET ORAL at 20:00

## 2022-01-01 RX ADMIN — MORPHINE SULFATE 10 MG: 100 SOLUTION ORAL at 02:19

## 2022-01-01 RX ADMIN — FUROSEMIDE 20 MG: 20 TABLET ORAL at 16:54

## 2022-01-01 RX ADMIN — POTASSIUM CHLORIDE 20 MEQ: 20 SOLUTION ORAL at 09:11

## 2022-01-01 RX ADMIN — SENNOSIDES AND DOCUSATE SODIUM 1 TABLET: 8.6; 5 TABLET ORAL at 21:34

## 2022-01-01 RX ADMIN — CLINDAMYCIN IN 5 PERCENT DEXTROSE 900 MG: 18 INJECTION, SOLUTION INTRAVENOUS at 01:34

## 2022-01-01 RX ADMIN — CLINDAMYCIN PHOSPHATE 900 MG: 900 INJECTION, SOLUTION INTRAVENOUS at 20:00

## 2022-01-01 RX ADMIN — SENNOSIDES AND DOCUSATE SODIUM 1 TABLET: 8.6; 5 TABLET ORAL at 10:18

## 2022-01-01 RX ADMIN — ACETAMINOPHEN 650 MG: 325 TABLET ORAL at 18:27

## 2022-01-01 RX ADMIN — Medication 12.5 MG: at 10:18

## 2022-01-01 RX ADMIN — SENNOSIDES AND DOCUSATE SODIUM 2 TABLET: 8.6; 5 TABLET ORAL at 21:49

## 2022-01-01 RX ADMIN — AMOXICILLIN AND CLAVULANATE POTASSIUM 1 TABLET: 875; 125 TABLET, FILM COATED ORAL at 08:15

## 2022-01-01 RX ADMIN — POTASSIUM CHLORIDE 10 MEQ: 7.46 INJECTION, SOLUTION INTRAVENOUS at 15:45

## 2022-01-01 RX ADMIN — LORAZEPAM 0.5 MG: 0.5 TABLET ORAL at 21:05

## 2022-01-01 RX ADMIN — ASPIRIN 325 MG ORAL TABLET 325 MG: 325 PILL ORAL at 08:57

## 2022-01-01 RX ADMIN — ASPIRIN 325 MG ORAL TABLET 325 MG: 325 PILL ORAL at 10:00

## 2022-01-01 RX ADMIN — THERA TABS 1 TABLET: TAB at 16:55

## 2022-01-01 RX ADMIN — POTASSIUM CHLORIDE 20 MEQ: 1500 TABLET, EXTENDED RELEASE ORAL at 22:36

## 2022-01-01 RX ADMIN — PRAMOXINE HYDROCHLORIDE: 10 LOTION TOPICAL at 09:52

## 2022-01-01 RX ADMIN — SODIUM CHLORIDE 500 ML: 9 INJECTION, SOLUTION INTRAVENOUS at 11:28

## 2022-01-01 RX ADMIN — ATORVASTATIN CALCIUM 40 MG: 40 TABLET, FILM COATED ORAL at 20:01

## 2022-01-01 RX ADMIN — FUROSEMIDE 20 MG: 20 TABLET ORAL at 17:19

## 2022-01-01 RX ADMIN — QUETIAPINE 12.5 MG: 25 TABLET, FILM COATED ORAL at 13:24

## 2022-01-01 RX ADMIN — SERTRALINE HYDROCHLORIDE 50 MG: 50 TABLET ORAL at 08:10

## 2022-01-01 RX ADMIN — MICONAZOLE NITRATE: 2 POWDER TOPICAL at 09:15

## 2022-01-01 RX ADMIN — ENOXAPARIN SODIUM 40 MG: 40 INJECTION SUBCUTANEOUS at 08:27

## 2022-01-01 RX ADMIN — FUROSEMIDE 20 MG: 20 TABLET ORAL at 10:18

## 2022-01-01 RX ADMIN — MICONAZOLE NITRATE: 2 POWDER TOPICAL at 21:45

## 2022-01-01 RX ADMIN — MICONAZOLE NITRATE: 2 POWDER TOPICAL at 01:28

## 2022-01-01 RX ADMIN — OLANZAPINE 2.5 MG: 2.5 TABLET, FILM COATED ORAL at 20:31

## 2022-01-01 RX ADMIN — ENOXAPARIN SODIUM 40 MG: 40 INJECTION SUBCUTANEOUS at 05:04

## 2022-01-01 RX ADMIN — SENNOSIDES AND DOCUSATE SODIUM 1 TABLET: 8.6; 5 TABLET ORAL at 09:14

## 2022-01-01 RX ADMIN — ENOXAPARIN SODIUM 40 MG: 40 INJECTION SUBCUTANEOUS at 09:40

## 2022-01-01 RX ADMIN — MICONAZOLE NITRATE: 2 POWDER TOPICAL at 20:20

## 2022-01-01 RX ADMIN — ENOXAPARIN SODIUM 40 MG: 40 INJECTION SUBCUTANEOUS at 09:11

## 2022-01-01 RX ADMIN — ACETAMINOPHEN 975 MG: 325 TABLET ORAL at 04:20

## 2022-01-01 RX ADMIN — OLANZAPINE 5 MG: 5 TABLET, ORALLY DISINTEGRATING ORAL at 23:26

## 2022-01-01 RX ADMIN — ENOXAPARIN SODIUM 40 MG: 40 INJECTION SUBCUTANEOUS at 10:32

## 2022-01-01 RX ADMIN — MICONAZOLE NITRATE: 2 POWDER TOPICAL at 09:11

## 2022-01-01 RX ADMIN — ACETAMINOPHEN 650 MG: 325 TABLET ORAL at 09:22

## 2022-01-01 RX ADMIN — Medication 12.5 MG: at 09:23

## 2022-01-01 RX ADMIN — FUROSEMIDE 20 MG: 20 TABLET ORAL at 17:15

## 2022-01-01 RX ADMIN — ASPIRIN 325 MG ORAL TABLET 325 MG: 325 PILL ORAL at 09:04

## 2022-01-01 RX ADMIN — PRAMOXINE HYDROCHLORIDE: 10 LOTION TOPICAL at 08:22

## 2022-01-01 RX ADMIN — SODIUM PHOSPHATE, MONOBASIC, MONOHYDRATE 15 MMOL: 276; 142 INJECTION, SOLUTION INTRAVENOUS at 17:23

## 2022-01-01 RX ADMIN — OXYCODONE HYDROCHLORIDE 5 MG: 5 TABLET ORAL at 22:31

## 2022-01-01 RX ADMIN — MICONAZOLE NITRATE: 2 POWDER TOPICAL at 21:31

## 2022-01-01 RX ADMIN — PRAMOXINE HYDROCHLORIDE: 10 LOTION TOPICAL at 21:53

## 2022-01-01 RX ADMIN — THERA TABS 1 TABLET: TAB at 16:46

## 2022-01-01 RX ADMIN — OLANZAPINE 2.5 MG: 2.5 TABLET, FILM COATED ORAL at 22:14

## 2022-01-01 RX ADMIN — POTASSIUM CHLORIDE 40 MEQ: 1.5 POWDER, FOR SOLUTION ORAL at 13:04

## 2022-01-01 RX ADMIN — OXYCODONE HYDROCHLORIDE 2.5 MG: 5 TABLET ORAL at 12:41

## 2022-01-01 RX ADMIN — PRAMOXINE HYDROCHLORIDE: 10 LOTION TOPICAL at 10:41

## 2022-01-01 RX ADMIN — PRAMOXINE HYDROCHLORIDE: 10 LOTION TOPICAL at 16:23

## 2022-01-01 RX ADMIN — POTASSIUM CHLORIDE 40 MEQ: 20 SOLUTION ORAL at 13:03

## 2022-01-01 RX ADMIN — OXYCODONE HYDROCHLORIDE 2.5 MG: 5 TABLET ORAL at 00:54

## 2022-01-01 RX ADMIN — ENOXAPARIN SODIUM 40 MG: 40 INJECTION SUBCUTANEOUS at 20:19

## 2022-01-01 RX ADMIN — POTASSIUM CHLORIDE 40 MEQ: 1500 TABLET, EXTENDED RELEASE ORAL at 23:16

## 2022-01-01 RX ADMIN — ENOXAPARIN SODIUM 40 MG: 40 INJECTION SUBCUTANEOUS at 00:19

## 2022-01-01 RX ADMIN — THERA TABS 1 TABLET: TAB at 18:23

## 2022-01-01 RX ADMIN — OXYCODONE HYDROCHLORIDE 5 MG: 5 TABLET ORAL at 13:57

## 2022-01-01 RX ADMIN — SERTRALINE HYDROCHLORIDE 50 MG: 50 TABLET ORAL at 08:15

## 2022-01-01 RX ADMIN — QUETIAPINE 12.5 MG: 25 TABLET, FILM COATED ORAL at 14:44

## 2022-01-01 RX ADMIN — ARIPIPRAZOLE 10 MG: 5 TABLET ORAL at 21:53

## 2022-01-01 RX ADMIN — ARIPIPRAZOLE 10 MG: 5 TABLET ORAL at 19:38

## 2022-01-01 RX ADMIN — POTASSIUM CHLORIDE 20 MEQ: 1500 TABLET, EXTENDED RELEASE ORAL at 08:54

## 2022-01-01 RX ADMIN — MICONAZOLE NITRATE: 2 POWDER TOPICAL at 22:13

## 2022-01-01 RX ADMIN — ATORVASTATIN CALCIUM 40 MG: 40 TABLET, FILM COATED ORAL at 20:07

## 2022-01-01 RX ADMIN — MICONAZOLE NITRATE: 2 POWDER TOPICAL at 08:36

## 2022-01-01 RX ADMIN — ARIPIPRAZOLE 10 MG: 5 TABLET ORAL at 20:05

## 2022-01-01 RX ADMIN — LORAZEPAM 0.5 MG: 0.5 TABLET ORAL at 21:04

## 2022-01-01 RX ADMIN — ACETAMINOPHEN 975 MG: 325 TABLET ORAL at 00:22

## 2022-01-01 RX ADMIN — OLANZAPINE 2.5 MG: 2.5 TABLET, FILM COATED ORAL at 00:25

## 2022-01-01 RX ADMIN — SENNOSIDES AND DOCUSATE SODIUM 2 TABLET: 8.6; 5 TABLET ORAL at 20:31

## 2022-01-01 RX ADMIN — ENOXAPARIN SODIUM 40 MG: 40 INJECTION SUBCUTANEOUS at 09:24

## 2022-01-01 RX ADMIN — PRAMOXINE HYDROCHLORIDE: 10 LOTION TOPICAL at 22:11

## 2022-01-01 RX ADMIN — POTASSIUM CHLORIDE 40 MEQ: 1500 TABLET, EXTENDED RELEASE ORAL at 17:06

## 2022-01-01 RX ADMIN — DICLOFENAC SODIUM 2 G: 10 GEL TOPICAL at 18:15

## 2022-01-01 RX ADMIN — SERTRALINE HYDROCHLORIDE 50 MG: 50 TABLET ORAL at 09:04

## 2022-01-01 RX ADMIN — OLANZAPINE 2.5 MG: 2.5 TABLET, FILM COATED ORAL at 21:26

## 2022-01-01 RX ADMIN — FUROSEMIDE 20 MG: 20 TABLET ORAL at 15:59

## 2022-01-01 RX ADMIN — DICLOFENAC SODIUM 2 G: 10 GEL TOPICAL at 09:44

## 2022-01-01 RX ADMIN — ENOXAPARIN SODIUM 40 MG: 40 INJECTION SUBCUTANEOUS at 20:26

## 2022-01-01 RX ADMIN — ASPIRIN 325 MG ORAL TABLET 325 MG: 325 PILL ORAL at 09:36

## 2022-01-01 RX ADMIN — MICONAZOLE NITRATE: 2 POWDER TOPICAL at 20:39

## 2022-01-01 RX ADMIN — MICONAZOLE NITRATE: 2 POWDER TOPICAL at 21:01

## 2022-01-01 RX ADMIN — QUETIAPINE FUMARATE 25 MG: 25 TABLET ORAL at 19:50

## 2022-01-01 RX ADMIN — ASPIRIN 325 MG ORAL TABLET 325 MG: 325 PILL ORAL at 10:11

## 2022-01-01 RX ADMIN — ENOXAPARIN SODIUM 40 MG: 40 INJECTION SUBCUTANEOUS at 13:24

## 2022-01-01 RX ADMIN — ACETAMINOPHEN 975 MG: 325 TABLET ORAL at 10:14

## 2022-01-01 RX ADMIN — Medication 1 CAPSULE: at 09:53

## 2022-01-01 RX ADMIN — ASPIRIN 325 MG ORAL TABLET 325 MG: 325 PILL ORAL at 09:32

## 2022-01-01 RX ADMIN — POLYETHYLENE GLYCOL 3350 17 G: 17 POWDER, FOR SOLUTION ORAL at 09:41

## 2022-01-01 RX ADMIN — ENOXAPARIN SODIUM 40 MG: 40 INJECTION SUBCUTANEOUS at 20:17

## 2022-01-01 RX ADMIN — POLYETHYLENE GLYCOL 3350 17 G: 17 POWDER, FOR SOLUTION ORAL at 09:26

## 2022-01-01 RX ADMIN — OXYCODONE HYDROCHLORIDE 5 MG: 5 TABLET ORAL at 16:25

## 2022-01-01 RX ADMIN — ATORVASTATIN CALCIUM 40 MG: 40 TABLET, FILM COATED ORAL at 21:19

## 2022-01-01 RX ADMIN — NITROGLYCERIN: 5 INJECTION, SOLUTION INTRAVENOUS at 15:33

## 2022-01-01 RX ADMIN — MICONAZOLE NITRATE: 2 POWDER TOPICAL at 08:44

## 2022-01-01 RX ADMIN — POTASSIUM CHLORIDE 40 MEQ: 1500 TABLET, EXTENDED RELEASE ORAL at 17:40

## 2022-01-01 RX ADMIN — OXYCODONE HYDROCHLORIDE 5 MG: 5 TABLET ORAL at 20:30

## 2022-01-01 RX ADMIN — Medication 12.5 MG: at 09:15

## 2022-01-01 RX ADMIN — FUROSEMIDE 20 MG: 10 INJECTION, SOLUTION INTRAMUSCULAR; INTRAVENOUS at 04:03

## 2022-01-01 RX ADMIN — SERTRALINE HYDROCHLORIDE 50 MG: 50 TABLET ORAL at 08:54

## 2022-01-01 RX ADMIN — Medication 1 CAPSULE: at 11:01

## 2022-01-01 RX ADMIN — DICLOFENAC SODIUM 2 G: 10 GEL TOPICAL at 08:55

## 2022-01-01 RX ADMIN — ASPIRIN 325 MG ORAL TABLET 325 MG: 325 PILL ORAL at 09:54

## 2022-01-01 RX ADMIN — POTASSIUM CHLORIDE 20 MEQ: 20 SOLUTION ORAL at 15:59

## 2022-01-01 RX ADMIN — SERTRALINE HYDROCHLORIDE 50 MG: 50 TABLET ORAL at 08:35

## 2022-01-01 RX ADMIN — ATORVASTATIN CALCIUM 40 MG: 40 TABLET, FILM COATED ORAL at 20:58

## 2022-01-01 RX ADMIN — LORAZEPAM 0.5 MG: 2 INJECTION INTRAMUSCULAR at 03:04

## 2022-01-01 RX ADMIN — OXYCODONE HYDROCHLORIDE 5 MG: 5 TABLET ORAL at 12:59

## 2022-01-01 RX ADMIN — MORPHINE SULFATE 10 MG: 100 SOLUTION ORAL at 18:18

## 2022-01-01 RX ADMIN — ACETAMINOPHEN 975 MG: 325 TABLET ORAL at 08:47

## 2022-01-01 RX ADMIN — SENNOSIDES AND DOCUSATE SODIUM 1 TABLET: 8.6; 5 TABLET ORAL at 20:37

## 2022-01-01 RX ADMIN — FUROSEMIDE 40 MG: 40 TABLET ORAL at 09:18

## 2022-01-01 RX ADMIN — SERTRALINE HYDROCHLORIDE 50 MG: 50 TABLET ORAL at 10:07

## 2022-01-01 RX ADMIN — HEPARIN SODIUM 2000 UNITS: 200 INJECTION, SOLUTION INTRAVENOUS at 15:53

## 2022-01-01 RX ADMIN — MICONAZOLE NITRATE: 2 POWDER TOPICAL at 22:04

## 2022-01-01 RX ADMIN — FUROSEMIDE 20 MG: 20 TABLET ORAL at 09:23

## 2022-01-01 RX ADMIN — FUROSEMIDE 20 MG: 20 TABLET ORAL at 18:48

## 2022-01-01 RX ADMIN — MICONAZOLE NITRATE: 2 POWDER TOPICAL at 21:35

## 2022-01-01 RX ADMIN — Medication 0.1 MCG/KG/MIN: at 18:06

## 2022-01-01 RX ADMIN — OLANZAPINE 5 MG: 5 TABLET, ORALLY DISINTEGRATING ORAL at 18:39

## 2022-01-01 RX ADMIN — POLYETHYLENE GLYCOL 3350 17 G: 17 POWDER, FOR SOLUTION ORAL at 09:36

## 2022-01-01 RX ADMIN — CLINDAMYCIN IN 5 PERCENT DEXTROSE 900 MG: 18 INJECTION, SOLUTION INTRAVENOUS at 10:04

## 2022-01-01 RX ADMIN — DICLOFENAC SODIUM 2 G: 10 GEL TOPICAL at 23:12

## 2022-01-01 RX ADMIN — PRAMOXINE HYDROCHLORIDE: 10 LOTION TOPICAL at 16:49

## 2022-01-01 RX ADMIN — PRAMOXINE HYDROCHLORIDE: 10 LOTION TOPICAL at 17:32

## 2022-01-01 RX ADMIN — FUROSEMIDE 40 MG: 40 TABLET ORAL at 09:24

## 2022-01-01 RX ADMIN — FUROSEMIDE 20 MG: 20 TABLET ORAL at 09:37

## 2022-01-01 RX ADMIN — DICLOFENAC SODIUM 2 G: 10 GEL TOPICAL at 22:05

## 2022-01-01 RX ADMIN — OLANZAPINE 2.5 MG: 2.5 TABLET, FILM COATED ORAL at 21:51

## 2022-01-01 RX ADMIN — DICLOFENAC SODIUM 2 G: 10 GEL TOPICAL at 17:31

## 2022-01-01 RX ADMIN — CARBIDOPA AND LEVODOPA 2.5 MG: 50; 200 TABLET, EXTENDED RELEASE ORAL at 12:26

## 2022-01-01 RX ADMIN — ENOXAPARIN SODIUM 40 MG: 40 INJECTION SUBCUTANEOUS at 17:41

## 2022-01-01 RX ADMIN — ENOXAPARIN SODIUM 40 MG: 40 INJECTION SUBCUTANEOUS at 20:21

## 2022-01-01 RX ADMIN — QUETIAPINE 12.5 MG: 25 TABLET, FILM COATED ORAL at 09:06

## 2022-01-01 RX ADMIN — ATORVASTATIN CALCIUM 40 MG: 40 TABLET, FILM COATED ORAL at 19:01

## 2022-01-01 RX ADMIN — FLUDROCORTISONE ACETATE 0.1 MG: 0.1 TABLET ORAL at 16:19

## 2022-01-01 RX ADMIN — LISINOPRIL 10 MG: 10 TABLET ORAL at 09:11

## 2022-01-01 RX ADMIN — ATORVASTATIN CALCIUM 40 MG: 40 TABLET, FILM COATED ORAL at 21:21

## 2022-01-01 RX ADMIN — MICONAZOLE NITRATE: 2 POWDER TOPICAL at 08:27

## 2022-01-01 RX ADMIN — MULTIPLE VITAMINS W/ MINERALS TAB 1 TABLET: TAB at 09:17

## 2022-01-01 RX ADMIN — POTASSIUM CHLORIDE 20 MEQ: 20 SOLUTION ORAL at 11:40

## 2022-01-01 RX ADMIN — ENOXAPARIN SODIUM 40 MG: 40 INJECTION SUBCUTANEOUS at 08:44

## 2022-01-01 RX ADMIN — OLANZAPINE 5 MG: 5 TABLET, ORALLY DISINTEGRATING ORAL at 00:09

## 2022-01-01 RX ADMIN — MICONAZOLE NITRATE: 2 POWDER TOPICAL at 11:42

## 2022-01-01 RX ADMIN — ENOXAPARIN SODIUM 40 MG: 40 INJECTION SUBCUTANEOUS at 01:12

## 2022-01-01 RX ADMIN — FUROSEMIDE 20 MG: 20 TABLET ORAL at 09:26

## 2022-01-01 RX ADMIN — DICLOFENAC SODIUM 2 G: 10 GEL TOPICAL at 17:52

## 2022-01-01 RX ADMIN — ENOXAPARIN SODIUM 40 MG: 40 INJECTION SUBCUTANEOUS at 06:22

## 2022-01-01 RX ADMIN — PRAMOXINE HYDROCHLORIDE: 10 LOTION TOPICAL at 17:35

## 2022-01-01 RX ADMIN — ENOXAPARIN SODIUM 40 MG: 40 INJECTION SUBCUTANEOUS at 20:37

## 2022-01-01 RX ADMIN — PRAMOXINE HYDROCHLORIDE: 10 LOTION TOPICAL at 11:44

## 2022-01-01 RX ADMIN — ATORVASTATIN CALCIUM 40 MG: 40 TABLET, FILM COATED ORAL at 21:14

## 2022-01-01 RX ADMIN — ACETAMINOPHEN 975 MG: 325 TABLET ORAL at 07:53

## 2022-01-01 RX ADMIN — MICONAZOLE NITRATE: 2 POWDER TOPICAL at 21:54

## 2022-01-01 RX ADMIN — SERTRALINE HYDROCHLORIDE 50 MG: 50 TABLET ORAL at 09:12

## 2022-01-01 RX ADMIN — ENOXAPARIN SODIUM 40 MG: 40 INJECTION SUBCUTANEOUS at 11:14

## 2022-01-01 RX ADMIN — PRAMOXINE HYDROCHLORIDE: 10 LOTION TOPICAL at 21:35

## 2022-01-01 RX ADMIN — DICLOFENAC SODIUM 2 G: 10 GEL TOPICAL at 22:38

## 2022-01-01 RX ADMIN — FUROSEMIDE 20 MG: 20 TABLET ORAL at 08:31

## 2022-01-01 RX ADMIN — PRAMOXINE HYDROCHLORIDE: 10 LOTION TOPICAL at 22:35

## 2022-01-01 RX ADMIN — ACETAMINOPHEN 975 MG: 325 TABLET ORAL at 16:11

## 2022-01-01 RX ADMIN — ACETAMINOPHEN 975 MG: 325 TABLET ORAL at 20:30

## 2022-01-01 RX ADMIN — ATORVASTATIN CALCIUM 40 MG: 40 TABLET, FILM COATED ORAL at 21:17

## 2022-01-01 RX ADMIN — ENOXAPARIN SODIUM 40 MG: 40 INJECTION SUBCUTANEOUS at 19:22

## 2022-01-01 RX ADMIN — LISINOPRIL 10 MG: 10 TABLET ORAL at 09:20

## 2022-01-01 RX ADMIN — POTASSIUM CHLORIDE 40 MEQ: 1500 TABLET, EXTENDED RELEASE ORAL at 10:28

## 2022-01-01 RX ADMIN — MICONAZOLE NITRATE: 2 POWDER TOPICAL at 10:07

## 2022-01-01 RX ADMIN — CLINDAMYCIN PHOSPHATE 900 MG: 900 INJECTION, SOLUTION INTRAVENOUS at 04:20

## 2022-01-01 RX ADMIN — ATORVASTATIN CALCIUM 40 MG: 40 TABLET, FILM COATED ORAL at 21:26

## 2022-01-01 RX ADMIN — HEPARIN SODIUM 3000 UNITS: 1000 INJECTION INTRAVENOUS; SUBCUTANEOUS at 15:33

## 2022-01-01 RX ADMIN — ENOXAPARIN SODIUM 40 MG: 40 INJECTION SUBCUTANEOUS at 21:46

## 2022-01-01 RX ADMIN — OXYCODONE HYDROCHLORIDE 5 MG: 5 TABLET ORAL at 22:45

## 2022-01-01 RX ADMIN — SODIUM PHOSPHATE, MONOBASIC, MONOHYDRATE 15 MMOL: 276; 142 INJECTION, SOLUTION INTRAVENOUS at 13:38

## 2022-01-01 RX ADMIN — MICONAZOLE NITRATE: 2 POWDER TOPICAL at 21:29

## 2022-01-01 RX ADMIN — HYDROCORTISONE SODIUM SUCCINATE 100 MG: 100 INJECTION, POWDER, FOR SOLUTION INTRAMUSCULAR; INTRAVENOUS at 19:59

## 2022-01-01 RX ADMIN — SENNOSIDES AND DOCUSATE SODIUM 1 TABLET: 8.6; 5 TABLET ORAL at 21:19

## 2022-01-01 RX ADMIN — ENOXAPARIN SODIUM 40 MG: 40 INJECTION SUBCUTANEOUS at 21:06

## 2022-01-01 RX ADMIN — Medication 12.5 MG: at 08:56

## 2022-01-01 RX ADMIN — ATORVASTATIN CALCIUM 40 MG: 40 TABLET, FILM COATED ORAL at 21:04

## 2022-01-01 RX ADMIN — FUROSEMIDE 20 MG: 20 TABLET ORAL at 09:34

## 2022-01-01 RX ADMIN — OLANZAPINE 5 MG: 5 TABLET, ORALLY DISINTEGRATING ORAL at 13:25

## 2022-01-01 RX ADMIN — THERA TABS 1 TABLET: TAB at 16:47

## 2022-01-01 RX ADMIN — OLANZAPINE 5 MG: 5 TABLET, ORALLY DISINTEGRATING ORAL at 20:30

## 2022-01-01 RX ADMIN — SENNOSIDES AND DOCUSATE SODIUM 1 TABLET: 8.6; 5 TABLET ORAL at 08:57

## 2022-01-01 RX ADMIN — DICLOFENAC SODIUM 2 G: 10 GEL TOPICAL at 12:22

## 2022-01-01 RX ADMIN — THERA TABS 1 TABLET: TAB at 17:43

## 2022-01-01 RX ADMIN — PRAMOXINE HYDROCHLORIDE: 10 LOTION TOPICAL at 22:36

## 2022-01-01 RX ADMIN — OXYCODONE HYDROCHLORIDE 5 MG: 5 TABLET ORAL at 12:26

## 2022-01-01 RX ADMIN — ENOXAPARIN SODIUM 40 MG: 40 INJECTION SUBCUTANEOUS at 08:25

## 2022-01-01 RX ADMIN — DICLOFENAC SODIUM 2 G: 10 GEL TOPICAL at 10:39

## 2022-01-01 RX ADMIN — ENOXAPARIN SODIUM 40 MG: 40 INJECTION SUBCUTANEOUS at 09:19

## 2022-01-01 RX ADMIN — THERA TABS 1 TABLET: TAB at 12:00

## 2022-01-01 RX ADMIN — MICONAZOLE NITRATE: 2 POWDER TOPICAL at 08:41

## 2022-01-01 RX ADMIN — MICONAZOLE NITRATE: 2 POWDER TOPICAL at 09:02

## 2022-01-01 RX ADMIN — FUROSEMIDE 20 MG: 20 TABLET ORAL at 15:51

## 2022-01-01 RX ADMIN — ENOXAPARIN SODIUM 40 MG: 40 INJECTION SUBCUTANEOUS at 00:49

## 2022-01-01 RX ADMIN — OXYCODONE HYDROCHLORIDE 5 MG: 5 TABLET ORAL at 01:46

## 2022-01-01 RX ADMIN — ONDANSETRON 4 MG: 2 INJECTION INTRAMUSCULAR; INTRAVENOUS at 01:40

## 2022-01-01 RX ADMIN — PRAMOXINE HYDROCHLORIDE: 10 LOTION TOPICAL at 18:49

## 2022-01-01 RX ADMIN — OXYCODONE HYDROCHLORIDE 5 MG: 5 TABLET ORAL at 09:51

## 2022-01-01 RX ADMIN — ARIPIPRAZOLE 10 MG: 5 TABLET ORAL at 21:55

## 2022-01-01 RX ADMIN — DICLOFENAC SODIUM 2 G: 10 GEL TOPICAL at 22:33

## 2022-01-01 RX ADMIN — ATORVASTATIN CALCIUM 40 MG: 40 TABLET, FILM COATED ORAL at 21:00

## 2022-01-01 RX ADMIN — ENOXAPARIN SODIUM 40 MG: 40 INJECTION SUBCUTANEOUS at 20:00

## 2022-01-01 RX ADMIN — IOPAMIDOL 75 ML: 755 INJECTION, SOLUTION INTRAVENOUS at 12:12

## 2022-01-01 RX ADMIN — PRAMOXINE HYDROCHLORIDE: 10 LOTION TOPICAL at 16:59

## 2022-01-01 RX ADMIN — MICONAZOLE NITRATE: 2 POWDER TOPICAL at 21:36

## 2022-01-01 RX ADMIN — OXYCODONE HYDROCHLORIDE 5 MG: 5 TABLET ORAL at 18:03

## 2022-01-01 RX ADMIN — POTASSIUM & SODIUM PHOSPHATES POWDER PACK 280-160-250 MG 1 PACKET: 280-160-250 PACK at 21:30

## 2022-01-01 RX ADMIN — POLYETHYLENE GLYCOL 3350 17 G: 17 POWDER, FOR SOLUTION ORAL at 10:39

## 2022-01-01 RX ADMIN — MICONAZOLE NITRATE: 2 POWDER TOPICAL at 10:01

## 2022-01-01 RX ADMIN — SENNOSIDES AND DOCUSATE SODIUM 1 TABLET: 8.6; 5 TABLET ORAL at 09:26

## 2022-01-01 RX ADMIN — ENOXAPARIN SODIUM 40 MG: 40 INJECTION SUBCUTANEOUS at 08:52

## 2022-01-01 RX ADMIN — PRAMOXINE HYDROCHLORIDE: 10 LOTION TOPICAL at 21:58

## 2022-01-01 RX ADMIN — FUROSEMIDE 20 MG: 20 TABLET ORAL at 08:45

## 2022-01-01 RX ADMIN — ACETAMINOPHEN 975 MG: 325 TABLET ORAL at 20:35

## 2022-01-01 RX ADMIN — MICONAZOLE NITRATE: 2 POWDER TOPICAL at 20:28

## 2022-01-01 RX ADMIN — ASPIRIN 325 MG ORAL TABLET 325 MG: 325 PILL ORAL at 08:15

## 2022-01-01 RX ADMIN — OXYCODONE HYDROCHLORIDE 5 MG: 5 TABLET ORAL at 06:44

## 2022-01-01 RX ADMIN — DICLOFENAC SODIUM 2 G: 10 GEL TOPICAL at 08:48

## 2022-01-01 RX ADMIN — MICONAZOLE NITRATE: 2 POWDER TOPICAL at 21:05

## 2022-01-01 RX ADMIN — PRAMOXINE HYDROCHLORIDE: 10 LOTION TOPICAL at 09:16

## 2022-01-01 RX ADMIN — SERTRALINE HYDROCHLORIDE 50 MG: 50 TABLET ORAL at 08:45

## 2022-01-01 RX ADMIN — THERA TABS 1 TABLET: TAB at 15:42

## 2022-01-01 RX ADMIN — PRAMOXINE HYDROCHLORIDE: 10 LOTION TOPICAL at 16:22

## 2022-01-01 RX ADMIN — MICONAZOLE NITRATE: 2 POWDER TOPICAL at 08:31

## 2022-01-01 RX ADMIN — FUROSEMIDE 20 MG: 20 TABLET ORAL at 17:00

## 2022-01-01 RX ADMIN — HYDROCORTISONE SODIUM SUCCINATE 100 MG: 100 INJECTION, POWDER, FOR SOLUTION INTRAMUSCULAR; INTRAVENOUS at 08:10

## 2022-01-01 RX ADMIN — LORAZEPAM 0.5 MG: 0.5 TABLET ORAL at 22:44

## 2022-01-01 RX ADMIN — FLUDROCORTISONE ACETATE 0.1 MG: 0.1 TABLET ORAL at 08:10

## 2022-01-01 RX ADMIN — PRAMOXINE HYDROCHLORIDE: 10 LOTION TOPICAL at 11:29

## 2022-01-01 RX ADMIN — SENNOSIDES AND DOCUSATE SODIUM 1 TABLET: 8.6; 5 TABLET ORAL at 08:48

## 2022-01-01 RX ADMIN — OLANZAPINE 2.5 MG: 2.5 TABLET, FILM COATED ORAL at 21:48

## 2022-01-01 RX ADMIN — SERTRALINE HYDROCHLORIDE 50 MG: 50 TABLET ORAL at 09:26

## 2022-01-01 RX ADMIN — POLYETHYLENE GLYCOL 3350 17 G: 17 POWDER, FOR SOLUTION ORAL at 09:57

## 2022-01-01 RX ADMIN — ENOXAPARIN SODIUM 40 MG: 40 INJECTION SUBCUTANEOUS at 17:50

## 2022-01-01 RX ADMIN — CLINDAMYCIN IN 5 PERCENT DEXTROSE 900 MG: 18 INJECTION, SOLUTION INTRAVENOUS at 09:59

## 2022-01-01 RX ADMIN — SODIUM PHOSPHATE, MONOBASIC, MONOHYDRATE 15 MMOL: 276; 142 INJECTION, SOLUTION INTRAVENOUS at 12:57

## 2022-01-01 RX ADMIN — ATORVASTATIN CALCIUM 40 MG: 40 TABLET, FILM COATED ORAL at 22:35

## 2022-01-01 RX ADMIN — POTASSIUM CHLORIDE 20 MEQ: 1500 TABLET, EXTENDED RELEASE ORAL at 09:29

## 2022-01-01 RX ADMIN — SERTRALINE HYDROCHLORIDE 50 MG: 50 TABLET ORAL at 09:16

## 2022-01-01 RX ADMIN — HYDROXYZINE HYDROCHLORIDE 50 MG: 25 TABLET ORAL at 10:55

## 2022-01-01 RX ADMIN — ARIPIPRAZOLE 10 MG: 5 TABLET ORAL at 21:45

## 2022-01-01 RX ADMIN — ATORVASTATIN CALCIUM 40 MG: 40 TABLET, FILM COATED ORAL at 20:37

## 2022-01-01 RX ADMIN — DICLOFENAC SODIUM 2 G: 10 GEL TOPICAL at 09:37

## 2022-01-01 RX ADMIN — SENNOSIDES AND DOCUSATE SODIUM 1 TABLET: 8.6; 5 TABLET ORAL at 21:07

## 2022-01-01 RX ADMIN — SENNOSIDES AND DOCUSATE SODIUM 2 TABLET: 8.6; 5 TABLET ORAL at 21:29

## 2022-01-01 RX ADMIN — ACETAMINOPHEN 975 MG: 325 TABLET ORAL at 13:22

## 2022-01-01 RX ADMIN — OXYCODONE HYDROCHLORIDE 5 MG: 5 TABLET ORAL at 00:11

## 2022-01-01 RX ADMIN — POTASSIUM CHLORIDE 20 MEQ: 20 SOLUTION ORAL at 16:03

## 2022-01-01 RX ADMIN — SODIUM PHOSPHATE, MONOBASIC, MONOHYDRATE 15 MMOL: 276; 142 INJECTION, SOLUTION INTRAVENOUS at 06:49

## 2022-01-01 RX ADMIN — ENOXAPARIN SODIUM 40 MG: 40 INJECTION SUBCUTANEOUS at 10:12

## 2022-01-01 RX ADMIN — OXYCODONE HYDROCHLORIDE 5 MG: 5 TABLET ORAL at 10:59

## 2022-01-01 RX ADMIN — ATORVASTATIN CALCIUM 40 MG: 40 TABLET, FILM COATED ORAL at 21:01

## 2022-01-01 RX ADMIN — SENNOSIDES AND DOCUSATE SODIUM 1 TABLET: 8.6; 5 TABLET ORAL at 08:00

## 2022-01-01 RX ADMIN — SERTRALINE HYDROCHLORIDE 50 MG: 50 TABLET ORAL at 08:02

## 2022-01-01 RX ADMIN — MICONAZOLE NITRATE: 2 POWDER TOPICAL at 22:17

## 2022-01-01 RX ADMIN — ENOXAPARIN SODIUM 40 MG: 40 INJECTION SUBCUTANEOUS at 06:15

## 2022-01-01 RX ADMIN — SERTRALINE HYDROCHLORIDE 50 MG: 50 TABLET ORAL at 10:12

## 2022-01-01 RX ADMIN — ASPIRIN 325 MG ORAL TABLET 325 MG: 325 PILL ORAL at 08:32

## 2022-01-01 RX ADMIN — MICONAZOLE NITRATE: 2 POWDER TOPICAL at 22:18

## 2022-01-01 RX ADMIN — ENOXAPARIN SODIUM 40 MG: 40 INJECTION SUBCUTANEOUS at 10:11

## 2022-01-01 RX ADMIN — POTASSIUM CHLORIDE 40 MEQ: 1500 TABLET, EXTENDED RELEASE ORAL at 09:33

## 2022-01-01 RX ADMIN — ENOXAPARIN SODIUM 40 MG: 40 INJECTION SUBCUTANEOUS at 09:50

## 2022-01-01 RX ADMIN — ACETAMINOPHEN 975 MG: 325 TABLET ORAL at 08:52

## 2022-01-01 RX ADMIN — POLYETHYLENE GLYCOL 3350 17 G: 17 POWDER, FOR SOLUTION ORAL at 09:30

## 2022-01-01 RX ADMIN — POTASSIUM CHLORIDE 20 MEQ: 1.5 POWDER, FOR SOLUTION ORAL at 16:11

## 2022-01-01 RX ADMIN — POLYETHYLENE GLYCOL 3350 17 G: 17 POWDER, FOR SOLUTION ORAL at 09:12

## 2022-01-01 RX ADMIN — ENOXAPARIN SODIUM 40 MG: 40 INJECTION SUBCUTANEOUS at 09:00

## 2022-01-01 RX ADMIN — ENOXAPARIN SODIUM 40 MG: 40 INJECTION SUBCUTANEOUS at 20:47

## 2022-01-01 RX ADMIN — OLANZAPINE 2.5 MG: 2.5 TABLET, FILM COATED ORAL at 20:44

## 2022-01-01 RX ADMIN — MICONAZOLE NITRATE: 2 POWDER TOPICAL at 21:56

## 2022-01-01 RX ADMIN — SENNOSIDES AND DOCUSATE SODIUM 1 TABLET: 8.6; 5 TABLET ORAL at 20:01

## 2022-01-01 RX ADMIN — OXYCODONE HYDROCHLORIDE 5 MG: 5 TABLET ORAL at 03:49

## 2022-01-01 RX ADMIN — DICLOFENAC SODIUM 2 G: 10 GEL TOPICAL at 17:22

## 2022-01-01 RX ADMIN — ASPIRIN 325 MG ORAL TABLET 325 MG: 325 PILL ORAL at 08:39

## 2022-01-01 RX ADMIN — ACETAMINOPHEN 975 MG: 325 TABLET ORAL at 08:25

## 2022-01-01 RX ADMIN — ACETAMINOPHEN 975 MG: 325 TABLET ORAL at 01:15

## 2022-01-01 RX ADMIN — ASPIRIN 325 MG ORAL TABLET 325 MG: 325 PILL ORAL at 09:12

## 2022-01-01 RX ADMIN — ATORVASTATIN CALCIUM 40 MG: 40 TABLET, FILM COATED ORAL at 20:23

## 2022-01-01 RX ADMIN — ONDANSETRON 4 MG: 2 INJECTION INTRAMUSCULAR; INTRAVENOUS at 12:24

## 2022-01-01 RX ADMIN — OXYCODONE HYDROCHLORIDE 5 MG: 5 TABLET ORAL at 08:50

## 2022-01-01 RX ADMIN — FUROSEMIDE 20 MG: 20 TABLET ORAL at 09:11

## 2022-01-01 RX ADMIN — OXYCODONE HYDROCHLORIDE 5 MG: 5 TABLET ORAL at 01:23

## 2022-01-01 RX ADMIN — CARBIDOPA AND LEVODOPA 2.5 MG: 50; 200 TABLET, EXTENDED RELEASE ORAL at 17:25

## 2022-01-01 RX ADMIN — SENNOSIDES AND DOCUSATE SODIUM 1 TABLET: 8.6; 5 TABLET ORAL at 21:04

## 2022-01-01 RX ADMIN — HYDROMORPHONE HYDROCHLORIDE 0.1 MG: 0.2 INJECTION, SOLUTION INTRAMUSCULAR; INTRAVENOUS; SUBCUTANEOUS at 08:32

## 2022-01-01 RX ADMIN — ENOXAPARIN SODIUM 40 MG: 40 INJECTION SUBCUTANEOUS at 22:15

## 2022-01-01 RX ADMIN — ATORVASTATIN CALCIUM 40 MG: 40 TABLET, FILM COATED ORAL at 20:56

## 2022-01-01 RX ADMIN — SENNOSIDES AND DOCUSATE SODIUM 2 TABLET: 8.6; 5 TABLET ORAL at 08:51

## 2022-01-01 RX ADMIN — SENNOSIDES AND DOCUSATE SODIUM 2 TABLET: 8.6; 5 TABLET ORAL at 09:01

## 2022-01-01 RX ADMIN — OXYCODONE HYDROCHLORIDE 5 MG: 5 TABLET ORAL at 12:17

## 2022-01-01 RX ADMIN — OXYCODONE HYDROCHLORIDE 5 MG: 5 TABLET ORAL at 22:07

## 2022-01-01 RX ADMIN — SENNOSIDES AND DOCUSATE SODIUM 1 TABLET: 8.6; 5 TABLET ORAL at 20:13

## 2022-01-01 RX ADMIN — OLANZAPINE 2.5 MG: 2.5 TABLET, FILM COATED ORAL at 21:10

## 2022-01-01 RX ADMIN — FUROSEMIDE 40 MG: 40 TABLET ORAL at 08:55

## 2022-01-01 RX ADMIN — SERTRALINE HYDROCHLORIDE 50 MG: 50 TABLET ORAL at 09:28

## 2022-01-01 RX ADMIN — MORPHINE SULFATE 10 MG: 100 SOLUTION ORAL at 06:43

## 2022-01-01 RX ADMIN — SERTRALINE HYDROCHLORIDE 50 MG: 50 TABLET ORAL at 09:31

## 2022-01-01 RX ADMIN — FUROSEMIDE 60 MG: 10 INJECTION, SOLUTION INTRAVENOUS at 09:05

## 2022-01-01 RX ADMIN — Medication 1 CAPSULE: at 08:01

## 2022-01-01 RX ADMIN — MICONAZOLE NITRATE: 2 POWDER TOPICAL at 22:02

## 2022-01-01 RX ADMIN — PRAMOXINE HYDROCHLORIDE: 10 LOTION TOPICAL at 09:36

## 2022-01-01 RX ADMIN — CEFTRIAXONE SODIUM 2 G: 2 INJECTION, POWDER, FOR SOLUTION INTRAMUSCULAR; INTRAVENOUS at 13:06

## 2022-01-01 RX ADMIN — WATER 10 ML: 1 INJECTION INTRAMUSCULAR; INTRAVENOUS; SUBCUTANEOUS at 15:37

## 2022-01-01 RX ADMIN — Medication 1 CAPSULE: at 22:27

## 2022-01-01 RX ADMIN — POTASSIUM CHLORIDE 20 MEQ: 1.5 POWDER, FOR SOLUTION ORAL at 08:37

## 2022-01-01 RX ADMIN — DICLOFENAC SODIUM 2 G: 10 GEL TOPICAL at 22:35

## 2022-01-01 RX ADMIN — THERA TABS 1 TABLET: TAB at 17:19

## 2022-01-01 RX ADMIN — ASPIRIN 325 MG ORAL TABLET 325 MG: 325 PILL ORAL at 10:16

## 2022-01-01 RX ADMIN — ENOXAPARIN SODIUM 40 MG: 40 INJECTION SUBCUTANEOUS at 08:18

## 2022-01-01 RX ADMIN — ACETAMINOPHEN 975 MG: 325 TABLET ORAL at 09:50

## 2022-01-01 RX ADMIN — ALTEPLASE 2 MG: 2.2 INJECTION, POWDER, LYOPHILIZED, FOR SOLUTION INTRAVENOUS at 16:48

## 2022-01-01 RX ADMIN — DICLOFENAC SODIUM 2 G: 10 GEL TOPICAL at 01:29

## 2022-01-01 RX ADMIN — SENNOSIDES AND DOCUSATE SODIUM 1 TABLET: 8.6; 5 TABLET ORAL at 09:33

## 2022-01-01 RX ADMIN — FUROSEMIDE 20 MG: 20 TABLET ORAL at 16:48

## 2022-01-01 RX ADMIN — ONDANSETRON 4 MG: 2 INJECTION INTRAMUSCULAR; INTRAVENOUS at 08:29

## 2022-01-01 RX ADMIN — CLINDAMYCIN HYDROCHLORIDE 300 MG: 300 CAPSULE ORAL at 17:50

## 2022-01-01 RX ADMIN — DICLOFENAC SODIUM 2 G: 10 GEL TOPICAL at 09:53

## 2022-01-01 RX ADMIN — PIPERACILLIN AND TAZOBACTAM 3.38 G: 3; .375 INJECTION, POWDER, FOR SOLUTION INTRAVENOUS at 01:22

## 2022-01-01 RX ADMIN — MICONAZOLE NITRATE: 2 POWDER TOPICAL at 22:15

## 2022-01-01 RX ADMIN — LORAZEPAM 0.5 MG: 0.5 TABLET ORAL at 04:20

## 2022-01-01 RX ADMIN — FUROSEMIDE 20 MG: 20 TABLET ORAL at 08:52

## 2022-01-01 RX ADMIN — ENOXAPARIN SODIUM 40 MG: 40 INJECTION SUBCUTANEOUS at 09:23

## 2022-01-01 RX ADMIN — OXYCODONE HYDROCHLORIDE 5 MG: 5 TABLET ORAL at 21:03

## 2022-01-01 RX ADMIN — LORAZEPAM 0.5 MG: 0.5 TABLET ORAL at 14:10

## 2022-01-01 RX ADMIN — MICONAZOLE NITRATE: 2 POWDER TOPICAL at 10:12

## 2022-01-01 RX ADMIN — SODIUM PHOSPHATE, MONOBASIC, MONOHYDRATE 15 MMOL: 276; 142 INJECTION, SOLUTION INTRAVENOUS at 08:09

## 2022-01-01 RX ADMIN — CLINDAMYCIN HYDROCHLORIDE 300 MG: 300 CAPSULE ORAL at 12:01

## 2022-01-01 RX ADMIN — POTASSIUM CHLORIDE 10 MEQ: 7.46 INJECTION, SOLUTION INTRAVENOUS at 04:30

## 2022-01-01 RX ADMIN — ATORVASTATIN CALCIUM 40 MG: 40 TABLET, FILM COATED ORAL at 19:41

## 2022-01-01 RX ADMIN — Medication 1 CAPSULE: at 20:23

## 2022-01-01 RX ADMIN — MICONAZOLE NITRATE: 2 POWDER TOPICAL at 20:59

## 2022-01-01 RX ADMIN — ENOXAPARIN SODIUM 40 MG: 40 INJECTION SUBCUTANEOUS at 08:47

## 2022-01-01 RX ADMIN — DICLOFENAC SODIUM 2 G: 10 GEL TOPICAL at 14:36

## 2022-01-01 RX ADMIN — ATORVASTATIN CALCIUM 40 MG: 40 TABLET, FILM COATED ORAL at 20:12

## 2022-01-01 RX ADMIN — ACETAMINOPHEN 650 MG: 325 TABLET ORAL at 12:06

## 2022-01-01 RX ADMIN — ATORVASTATIN CALCIUM 40 MG: 40 TABLET, FILM COATED ORAL at 21:50

## 2022-01-01 RX ADMIN — PRAMOXINE HYDROCHLORIDE: 10 LOTION TOPICAL at 11:18

## 2022-01-01 RX ADMIN — FUROSEMIDE 20 MG: 20 TABLET ORAL at 16:24

## 2022-01-01 RX ADMIN — ACETAMINOPHEN 650 MG: 325 TABLET ORAL at 13:38

## 2022-01-01 RX ADMIN — OLANZAPINE 2.5 MG: 2.5 TABLET, FILM COATED ORAL at 21:04

## 2022-01-01 RX ADMIN — POTASSIUM CHLORIDE 20 MEQ: 29.8 INJECTION, SOLUTION INTRAVENOUS at 11:45

## 2022-01-01 RX ADMIN — HYDROXYZINE HYDROCHLORIDE 50 MG: 25 TABLET ORAL at 14:20

## 2022-01-01 RX ADMIN — OXYCODONE HYDROCHLORIDE 5 MG: 5 TABLET ORAL at 10:56

## 2022-01-01 RX ADMIN — ACETAMINOPHEN 975 MG: 325 TABLET ORAL at 02:59

## 2022-01-01 RX ADMIN — ENOXAPARIN SODIUM 40 MG: 40 INJECTION SUBCUTANEOUS at 05:06

## 2022-01-01 RX ADMIN — CHLORHEXIDINE GLUCONATE 15 ML: 1.2 SOLUTION ORAL at 21:22

## 2022-01-01 RX ADMIN — OXYCODONE HYDROCHLORIDE 2.5 MG: 5 TABLET ORAL at 17:36

## 2022-01-01 RX ADMIN — SENNOSIDES AND DOCUSATE SODIUM 1 TABLET: 8.6; 5 TABLET ORAL at 22:06

## 2022-01-01 RX ADMIN — OLANZAPINE 5 MG: 5 TABLET, ORALLY DISINTEGRATING ORAL at 20:33

## 2022-01-01 RX ADMIN — SERTRALINE HYDROCHLORIDE 50 MG: 50 TABLET ORAL at 09:35

## 2022-01-01 RX ADMIN — ENOXAPARIN SODIUM 40 MG: 40 INJECTION SUBCUTANEOUS at 01:06

## 2022-01-01 RX ADMIN — SENNOSIDES AND DOCUSATE SODIUM 1 TABLET: 8.6; 5 TABLET ORAL at 20:41

## 2022-01-01 RX ADMIN — QUETIAPINE 12.5 MG: 25 TABLET, FILM COATED ORAL at 20:41

## 2022-01-01 RX ADMIN — MULTIPLE VITAMINS W/ MINERALS TAB 1 TABLET: TAB at 10:15

## 2022-01-01 RX ADMIN — CLINDAMYCIN HYDROCHLORIDE 300 MG: 300 CAPSULE ORAL at 08:19

## 2022-01-01 RX ADMIN — FUROSEMIDE 20 MG: 20 TABLET ORAL at 15:41

## 2022-01-01 RX ADMIN — ENOXAPARIN SODIUM 40 MG: 40 INJECTION SUBCUTANEOUS at 20:14

## 2022-01-01 RX ADMIN — DICLOFENAC SODIUM 2 G: 10 GEL TOPICAL at 18:24

## 2022-01-01 RX ADMIN — POTASSIUM CHLORIDE 10 MEQ: 7.46 INJECTION, SOLUTION INTRAVENOUS at 01:30

## 2022-01-01 RX ADMIN — HYDROXYZINE HYDROCHLORIDE 50 MG: 25 TABLET ORAL at 16:24

## 2022-01-01 RX ADMIN — PRAMOXINE HYDROCHLORIDE: 10 LOTION TOPICAL at 17:44

## 2022-01-01 RX ADMIN — MEDROXYPROGESTERONE ACETATE 10 MG: 10 TABLET ORAL at 08:26

## 2022-01-01 RX ADMIN — PROPOFOL 40 MCG/KG/MIN: 10 INJECTION, EMULSION INTRAVENOUS at 16:30

## 2022-01-01 RX ADMIN — OXYCODONE HYDROCHLORIDE 5 MG: 5 TABLET ORAL at 08:13

## 2022-01-01 RX ADMIN — Medication 0.07 MCG/KG/MIN: at 21:37

## 2022-01-01 RX ADMIN — THERA TABS 1 TABLET: TAB at 17:15

## 2022-01-01 RX ADMIN — ENOXAPARIN SODIUM 40 MG: 40 INJECTION SUBCUTANEOUS at 09:31

## 2022-01-01 RX ADMIN — MICONAZOLE NITRATE: 2 POWDER TOPICAL at 08:11

## 2022-01-01 RX ADMIN — FUROSEMIDE 40 MG: 40 TABLET ORAL at 09:39

## 2022-01-01 RX ADMIN — FUROSEMIDE 20 MG: 10 INJECTION, SOLUTION INTRAMUSCULAR; INTRAVENOUS at 11:44

## 2022-01-01 RX ADMIN — MULTIPLE VITAMINS W/ MINERALS TAB 1 TABLET: TAB at 08:25

## 2022-01-01 RX ADMIN — ENOXAPARIN SODIUM 40 MG: 40 INJECTION SUBCUTANEOUS at 21:19

## 2022-01-01 RX ADMIN — ENOXAPARIN SODIUM 40 MG: 40 INJECTION SUBCUTANEOUS at 08:51

## 2022-01-01 RX ADMIN — PRAMOXINE HYDROCHLORIDE: 10 LOTION TOPICAL at 15:46

## 2022-01-01 RX ADMIN — PRAMOXINE HYDROCHLORIDE: 10 LOTION TOPICAL at 09:08

## 2022-01-01 RX ADMIN — Medication 12.5 MG: at 08:59

## 2022-01-01 RX ADMIN — POTASSIUM CHLORIDE 40 MEQ: 20 SOLUTION ORAL at 12:03

## 2022-01-01 RX ADMIN — SENNOSIDES AND DOCUSATE SODIUM 1 TABLET: 8.6; 5 TABLET ORAL at 09:04

## 2022-01-01 RX ADMIN — ACETAMINOPHEN 975 MG: 325 TABLET ORAL at 16:22

## 2022-01-01 RX ADMIN — MULTIPLE VITAMINS W/ MINERALS TAB 1 TABLET: TAB at 08:09

## 2022-01-01 RX ADMIN — OXYCODONE HYDROCHLORIDE 5 MG: 5 TABLET ORAL at 08:01

## 2022-01-01 RX ADMIN — PRAMOXINE HYDROCHLORIDE: 10 LOTION TOPICAL at 12:13

## 2022-01-01 RX ADMIN — POLYETHYLENE GLYCOL 3350 17 G: 17 POWDER, FOR SOLUTION ORAL at 08:24

## 2022-01-01 RX ADMIN — Medication 1 CAPSULE: at 20:38

## 2022-01-01 RX ADMIN — POTASSIUM CHLORIDE 20 MEQ: 1.5 POWDER, FOR SOLUTION ORAL at 10:00

## 2022-01-01 RX ADMIN — ARIPIPRAZOLE 10 MG: 5 TABLET ORAL at 22:35

## 2022-01-01 RX ADMIN — FUROSEMIDE 20 MG: 20 TABLET ORAL at 17:26

## 2022-01-01 RX ADMIN — ACETAMINOPHEN 975 MG: 325 TABLET ORAL at 22:35

## 2022-01-01 RX ADMIN — SODIUM CHLORIDE: 9 INJECTION, SOLUTION INTRAVENOUS at 17:40

## 2022-01-01 RX ADMIN — DEXMEDETOMIDINE HYDROCHLORIDE 10 MCG: 200 INJECTION INTRAVENOUS at 09:35

## 2022-01-01 RX ADMIN — MULTIPLE VITAMINS W/ MINERALS TAB 1 TABLET: TAB at 09:30

## 2022-01-01 RX ADMIN — DICLOFENAC SODIUM 2 G: 10 GEL TOPICAL at 13:45

## 2022-01-01 RX ADMIN — OXYCODONE HYDROCHLORIDE 5 MG: 5 TABLET ORAL at 10:51

## 2022-01-01 RX ADMIN — ASPIRIN 325 MG ORAL TABLET 325 MG: 325 PILL ORAL at 08:34

## 2022-01-01 RX ADMIN — QUETIAPINE 12.5 MG: 25 TABLET, FILM COATED ORAL at 10:51

## 2022-01-01 RX ADMIN — POTASSIUM CHLORIDE 40 MEQ: 1500 TABLET, EXTENDED RELEASE ORAL at 08:47

## 2022-01-01 RX ADMIN — FENTANYL CITRATE 50 MCG: 50 INJECTION, SOLUTION INTRAMUSCULAR; INTRAVENOUS at 09:27

## 2022-01-01 RX ADMIN — ENOXAPARIN SODIUM 40 MG: 40 INJECTION SUBCUTANEOUS at 21:36

## 2022-01-01 RX ADMIN — HYDROXYZINE HYDROCHLORIDE 50 MG: 25 TABLET ORAL at 20:35

## 2022-01-01 RX ADMIN — SENNOSIDES AND DOCUSATE SODIUM 2 TABLET: 8.6; 5 TABLET ORAL at 08:50

## 2022-01-01 RX ADMIN — FUROSEMIDE 40 MG: 40 TABLET ORAL at 08:50

## 2022-01-01 RX ADMIN — POTASSIUM CHLORIDE 10 MEQ: 7.46 INJECTION, SOLUTION INTRAVENOUS at 11:15

## 2022-01-01 RX ADMIN — ACETAMINOPHEN 650 MG: 325 TABLET ORAL at 06:47

## 2022-01-01 RX ADMIN — SENNOSIDES AND DOCUSATE SODIUM 1 TABLET: 8.6; 5 TABLET ORAL at 21:39

## 2022-01-01 RX ADMIN — PIPERACILLIN AND TAZOBACTAM 3.38 G: 3; .375 INJECTION, POWDER, FOR SOLUTION INTRAVENOUS at 18:51

## 2022-01-01 RX ADMIN — ENOXAPARIN SODIUM 40 MG: 40 INJECTION SUBCUTANEOUS at 16:27

## 2022-01-01 RX ADMIN — MORPHINE SULFATE 10 MG: 100 SOLUTION ORAL at 16:01

## 2022-01-01 RX ADMIN — MICONAZOLE NITRATE: 2 POWDER TOPICAL at 10:11

## 2022-01-01 RX ADMIN — SENNOSIDES AND DOCUSATE SODIUM 1 TABLET: 8.6; 5 TABLET ORAL at 20:58

## 2022-01-01 RX ADMIN — FUROSEMIDE 20 MG: 20 TABLET ORAL at 15:55

## 2022-01-01 RX ADMIN — LISINOPRIL 10 MG: 10 TABLET ORAL at 08:15

## 2022-01-01 RX ADMIN — ENOXAPARIN SODIUM 40 MG: 40 INJECTION SUBCUTANEOUS at 20:36

## 2022-01-01 RX ADMIN — OLANZAPINE 2.5 MG: 2.5 TABLET, FILM COATED ORAL at 22:35

## 2022-01-01 RX ADMIN — HYDROMORPHONE HYDROCHLORIDE 0.3 MG: 1 INJECTION, SOLUTION INTRAMUSCULAR; INTRAVENOUS; SUBCUTANEOUS at 10:44

## 2022-01-01 RX ADMIN — MICONAZOLE NITRATE: 2 POWDER TOPICAL at 23:29

## 2022-01-01 RX ADMIN — SERTRALINE HYDROCHLORIDE 50 MG: 50 TABLET ORAL at 09:10

## 2022-01-01 RX ADMIN — SENNOSIDES AND DOCUSATE SODIUM 2 TABLET: 8.6; 5 TABLET ORAL at 20:27

## 2022-01-01 RX ADMIN — ENOXAPARIN SODIUM 40 MG: 40 INJECTION SUBCUTANEOUS at 17:26

## 2022-01-01 RX ADMIN — FUROSEMIDE 20 MG: 20 TABLET ORAL at 15:58

## 2022-01-01 RX ADMIN — AMOXICILLIN AND CLAVULANATE POTASSIUM 1 TABLET: 875; 125 TABLET, FILM COATED ORAL at 21:11

## 2022-01-01 RX ADMIN — ACETAMINOPHEN 975 MG: 325 TABLET ORAL at 08:46

## 2022-01-01 RX ADMIN — THERA TABS 1 TABLET: TAB at 18:13

## 2022-01-01 RX ADMIN — MICONAZOLE NITRATE: 2 POWDER TOPICAL at 10:35

## 2022-01-01 RX ADMIN — ARIPIPRAZOLE 5 MG: 5 TABLET ORAL at 20:35

## 2022-01-01 RX ADMIN — ENOXAPARIN SODIUM 40 MG: 40 INJECTION SUBCUTANEOUS at 21:10

## 2022-01-01 RX ADMIN — DICLOFENAC SODIUM 2 G: 10 GEL TOPICAL at 14:30

## 2022-01-01 RX ADMIN — MICONAZOLE NITRATE: 2 POWDER TOPICAL at 09:51

## 2022-01-01 RX ADMIN — ARIPIPRAZOLE 10 MG: 5 TABLET ORAL at 19:35

## 2022-01-01 RX ADMIN — OXYCODONE HYDROCHLORIDE 2.5 MG: 5 TABLET ORAL at 20:03

## 2022-01-01 RX ADMIN — FUROSEMIDE 20 MG: 20 TABLET ORAL at 16:20

## 2022-01-01 RX ADMIN — DEXMEDETOMIDINE HYDROCHLORIDE 10 MCG: 200 INJECTION INTRAVENOUS at 09:32

## 2022-01-01 RX ADMIN — DICLOFENAC SODIUM 2 G: 10 GEL TOPICAL at 14:44

## 2022-01-01 RX ADMIN — Medication 12.5 MG: at 08:54

## 2022-01-01 RX ADMIN — ACETAMINOPHEN 650 MG: 325 TABLET ORAL at 02:56

## 2022-01-01 RX ADMIN — AMOXICILLIN AND CLAVULANATE POTASSIUM 1 TABLET: 875; 125 TABLET, FILM COATED ORAL at 09:00

## 2022-01-01 RX ADMIN — MICONAZOLE NITRATE: 2 POWDER TOPICAL at 21:06

## 2022-01-01 RX ADMIN — HYDROMORPHONE HYDROCHLORIDE 0.5 MG: 1 INJECTION, SOLUTION INTRAMUSCULAR; INTRAVENOUS; SUBCUTANEOUS at 23:16

## 2022-01-01 RX ADMIN — ARIPIPRAZOLE 10 MG: 5 TABLET ORAL at 21:07

## 2022-01-01 RX ADMIN — ACETAMINOPHEN 975 MG: 325 TABLET ORAL at 13:30

## 2022-01-01 RX ADMIN — POTASSIUM CHLORIDE 20 MEQ: 1500 TABLET, EXTENDED RELEASE ORAL at 09:56

## 2022-01-01 RX ADMIN — ASPIRIN 325 MG ORAL TABLET 325 MG: 325 PILL ORAL at 19:24

## 2022-01-01 RX ADMIN — POLYETHYLENE GLYCOL 3350 17 G: 17 POWDER, FOR SOLUTION ORAL at 11:06

## 2022-01-01 RX ADMIN — ASPIRIN 325 MG ORAL TABLET 325 MG: 325 PILL ORAL at 08:42

## 2022-01-01 RX ADMIN — FUROSEMIDE 20 MG: 20 TABLET ORAL at 16:03

## 2022-01-01 RX ADMIN — ACETAMINOPHEN 975 MG: 325 TABLET ORAL at 14:46

## 2022-01-01 RX ADMIN — FUROSEMIDE 40 MG: 40 TABLET ORAL at 08:44

## 2022-01-01 RX ADMIN — ATORVASTATIN CALCIUM 40 MG: 40 TABLET, FILM COATED ORAL at 20:47

## 2022-01-01 RX ADMIN — SENNOSIDES AND DOCUSATE SODIUM 1 TABLET: 8.6; 5 TABLET ORAL at 09:00

## 2022-01-01 RX ADMIN — OLANZAPINE 5 MG: 5 TABLET, ORALLY DISINTEGRATING ORAL at 16:43

## 2022-01-01 RX ADMIN — OXYCODONE HYDROCHLORIDE 5 MG: 5 TABLET ORAL at 09:09

## 2022-01-01 RX ADMIN — QUETIAPINE 12.5 MG: 25 TABLET, FILM COATED ORAL at 01:35

## 2022-01-01 RX ADMIN — POTASSIUM CHLORIDE 20 MEQ: 1500 TABLET, EXTENDED RELEASE ORAL at 08:59

## 2022-01-01 RX ADMIN — FUROSEMIDE 20 MG: 20 TABLET ORAL at 10:20

## 2022-01-01 RX ADMIN — POTASSIUM CHLORIDE 10 MEQ: 7.46 INJECTION, SOLUTION INTRAVENOUS at 10:46

## 2022-01-01 RX ADMIN — MICONAZOLE NITRATE: 2 POWDER TOPICAL at 09:38

## 2022-01-01 RX ADMIN — OXYCODONE HYDROCHLORIDE 5 MG: 5 TABLET ORAL at 14:45

## 2022-01-01 RX ADMIN — OXYCODONE HYDROCHLORIDE 2.5 MG: 5 TABLET ORAL at 16:34

## 2022-01-01 RX ADMIN — Medication 12.5 MG: at 08:21

## 2022-01-01 RX ADMIN — ACETAMINOPHEN 650 MG: 325 TABLET ORAL at 06:26

## 2022-01-01 RX ADMIN — HYDROCORTISONE SODIUM SUCCINATE 100 MG: 100 INJECTION, POWDER, FOR SOLUTION INTRAMUSCULAR; INTRAVENOUS at 08:09

## 2022-01-01 RX ADMIN — ENOXAPARIN SODIUM 40 MG: 40 INJECTION SUBCUTANEOUS at 21:18

## 2022-01-01 RX ADMIN — OLANZAPINE 2.5 MG: 2.5 TABLET, FILM COATED ORAL at 21:28

## 2022-01-01 RX ADMIN — FUROSEMIDE 20 MG: 20 TABLET ORAL at 18:45

## 2022-01-01 RX ADMIN — LISINOPRIL 10 MG: 10 TABLET ORAL at 09:07

## 2022-01-01 RX ADMIN — POLYETHYLENE GLYCOL 3350 17 G: 17 POWDER, FOR SOLUTION ORAL at 09:05

## 2022-01-01 RX ADMIN — MICONAZOLE NITRATE: 2 POWDER TOPICAL at 19:58

## 2022-01-01 RX ADMIN — DICLOFENAC SODIUM 2 G: 10 GEL TOPICAL at 21:55

## 2022-01-01 RX ADMIN — POTASSIUM CHLORIDE 10 MEQ: 7.46 INJECTION, SOLUTION INTRAVENOUS at 21:34

## 2022-01-01 RX ADMIN — ACETAMINOPHEN 975 MG: 325 TABLET ORAL at 08:50

## 2022-01-01 RX ADMIN — ARIPIPRAZOLE 10 MG: 5 TABLET ORAL at 19:47

## 2022-01-01 RX ADMIN — MICONAZOLE NITRATE: 2 POWDER TOPICAL at 08:03

## 2022-01-01 RX ADMIN — DICLOFENAC SODIUM 2 G: 10 GEL TOPICAL at 14:40

## 2022-01-01 RX ADMIN — POLYETHYLENE GLYCOL 3350 17 G: 17 POWDER, FOR SOLUTION ORAL at 09:27

## 2022-01-01 RX ADMIN — ASPIRIN 325 MG ORAL TABLET 325 MG: 325 PILL ORAL at 11:06

## 2022-01-01 RX ADMIN — SERTRALINE HYDROCHLORIDE 50 MG: 50 TABLET ORAL at 10:18

## 2022-01-01 RX ADMIN — SERTRALINE HYDROCHLORIDE 50 MG: 50 TABLET ORAL at 09:17

## 2022-01-01 RX ADMIN — SENNOSIDES AND DOCUSATE SODIUM 2 TABLET: 8.6; 5 TABLET ORAL at 09:59

## 2022-01-01 RX ADMIN — MICONAZOLE NITRATE: 2 POWDER TOPICAL at 08:49

## 2022-01-01 RX ADMIN — ENOXAPARIN SODIUM 40 MG: 40 INJECTION SUBCUTANEOUS at 06:49

## 2022-01-01 RX ADMIN — OLANZAPINE 5 MG: 5 TABLET, ORALLY DISINTEGRATING ORAL at 17:02

## 2022-01-01 RX ADMIN — OLANZAPINE 2.5 MG: 2.5 TABLET, FILM COATED ORAL at 21:41

## 2022-01-01 RX ADMIN — MICONAZOLE NITRATE: 2 POWDER TOPICAL at 09:46

## 2022-01-01 RX ADMIN — ACETAMINOPHEN 975 MG: 325 TABLET ORAL at 20:13

## 2022-01-01 RX ADMIN — CARBIDOPA AND LEVODOPA 2.5 MG: 50; 200 TABLET, EXTENDED RELEASE ORAL at 17:31

## 2022-01-01 RX ADMIN — ACETAMINOPHEN 975 MG: 325 TABLET ORAL at 20:24

## 2022-01-01 RX ADMIN — OXYCODONE HYDROCHLORIDE 5 MG: 5 TABLET ORAL at 10:45

## 2022-01-01 RX ADMIN — FLUDROCORTISONE ACETATE 0.1 MG: 0.1 TABLET ORAL at 08:33

## 2022-01-01 RX ADMIN — POTASSIUM CHLORIDE 20 MEQ: 1500 TABLET, EXTENDED RELEASE ORAL at 11:06

## 2022-01-01 RX ADMIN — PIPERACILLIN AND TAZOBACTAM 3.38 G: 3; .375 INJECTION, POWDER, FOR SOLUTION INTRAVENOUS at 19:31

## 2022-01-01 RX ADMIN — FUROSEMIDE 20 MG: 20 TABLET ORAL at 09:43

## 2022-01-01 RX ADMIN — SENNOSIDES AND DOCUSATE SODIUM 1 TABLET: 8.6; 5 TABLET ORAL at 20:17

## 2022-01-01 RX ADMIN — SODIUM PHOSPHATE, MONOBASIC, MONOHYDRATE 15 MMOL: 276; 142 INJECTION, SOLUTION INTRAVENOUS at 17:34

## 2022-01-01 RX ADMIN — MICONAZOLE NITRATE: 2 POWDER TOPICAL at 22:44

## 2022-01-01 RX ADMIN — FUROSEMIDE 20 MG: 20 TABLET ORAL at 15:21

## 2022-01-01 RX ADMIN — ACETAMINOPHEN 975 MG: 325 TABLET ORAL at 17:21

## 2022-01-01 RX ADMIN — DICLOFENAC SODIUM 2 G: 10 GEL TOPICAL at 14:09

## 2022-01-01 RX ADMIN — MICONAZOLE NITRATE: 2 POWDER TOPICAL at 21:27

## 2022-01-01 RX ADMIN — ACETAMINOPHEN 975 MG: 325 TABLET ORAL at 19:52

## 2022-01-01 RX ADMIN — FUROSEMIDE 40 MG: 40 TABLET ORAL at 08:17

## 2022-01-01 RX ADMIN — FUROSEMIDE 20 MG: 20 TABLET ORAL at 08:37

## 2022-01-01 RX ADMIN — MICONAZOLE NITRATE: 2 POWDER TOPICAL at 09:52

## 2022-01-01 RX ADMIN — ASPIRIN 325 MG ORAL TABLET 325 MG: 325 PILL ORAL at 08:46

## 2022-01-01 RX ADMIN — HYDROMORPHONE HYDROCHLORIDE 0.1 MG: 0.2 INJECTION, SOLUTION INTRAMUSCULAR; INTRAVENOUS; SUBCUTANEOUS at 16:04

## 2022-01-01 RX ADMIN — OXYCODONE HYDROCHLORIDE 2.5 MG: 5 TABLET ORAL at 20:46

## 2022-01-01 RX ADMIN — Medication 1 CAPSULE: at 09:24

## 2022-01-01 RX ADMIN — QUETIAPINE FUMARATE 25 MG: 25 TABLET ORAL at 20:31

## 2022-01-01 RX ADMIN — LISINOPRIL 10 MG: 10 TABLET ORAL at 09:01

## 2022-01-01 RX ADMIN — ENOXAPARIN SODIUM 40 MG: 40 INJECTION SUBCUTANEOUS at 21:51

## 2022-01-01 RX ADMIN — MICONAZOLE NITRATE: 2 POWDER TOPICAL at 20:41

## 2022-01-01 RX ADMIN — ASPIRIN 325 MG ORAL TABLET 325 MG: 325 PILL ORAL at 08:26

## 2022-01-01 RX ADMIN — MULTIPLE VITAMINS W/ MINERALS TAB 1 TABLET: TAB at 08:51

## 2022-01-01 RX ADMIN — FUROSEMIDE 20 MG: 20 TABLET ORAL at 08:26

## 2022-01-01 RX ADMIN — FUROSEMIDE 20 MG: 20 TABLET ORAL at 17:24

## 2022-01-01 RX ADMIN — OXYCODONE HYDROCHLORIDE 2.5 MG: 5 TABLET ORAL at 00:19

## 2022-01-01 RX ADMIN — ASPIRIN 325 MG ORAL TABLET 325 MG: 325 PILL ORAL at 11:00

## 2022-01-01 RX ADMIN — ENOXAPARIN SODIUM 40 MG: 40 INJECTION SUBCUTANEOUS at 10:15

## 2022-01-01 RX ADMIN — SERTRALINE HYDROCHLORIDE 50 MG: 50 TABLET ORAL at 09:53

## 2022-01-01 RX ADMIN — OLANZAPINE 2.5 MG: 2.5 TABLET, FILM COATED ORAL at 22:56

## 2022-01-01 RX ADMIN — DICLOFENAC SODIUM 2 G: 10 GEL TOPICAL at 21:53

## 2022-01-01 RX ADMIN — SERTRALINE HYDROCHLORIDE 50 MG: 50 TABLET ORAL at 10:15

## 2022-01-01 RX ADMIN — QUETIAPINE 12.5 MG: 25 TABLET, FILM COATED ORAL at 05:55

## 2022-01-01 RX ADMIN — ENOXAPARIN SODIUM 40 MG: 40 INJECTION SUBCUTANEOUS at 11:06

## 2022-01-01 RX ADMIN — SODIUM PHOSPHATE, MONOBASIC, MONOHYDRATE 15 MMOL: 276; 142 INJECTION, SOLUTION INTRAVENOUS at 09:58

## 2022-01-01 RX ADMIN — POTASSIUM CHLORIDE 20 MEQ: 1.5 POWDER, FOR SOLUTION ORAL at 16:48

## 2022-01-01 RX ADMIN — ASPIRIN 325 MG ORAL TABLET 325 MG: 325 PILL ORAL at 09:25

## 2022-01-01 RX ADMIN — MICONAZOLE NITRATE: 2 POWDER TOPICAL at 08:58

## 2022-01-01 RX ADMIN — LEVOFLOXACIN 750 MG: 750 TABLET, FILM COATED ORAL at 07:52

## 2022-01-01 RX ADMIN — PROPOFOL 30 MCG/KG/MIN: 10 INJECTION, EMULSION INTRAVENOUS at 20:30

## 2022-01-01 RX ADMIN — PRAMOXINE HYDROCHLORIDE: 10 LOTION TOPICAL at 09:07

## 2022-01-01 RX ADMIN — MICONAZOLE NITRATE: 2 POWDER TOPICAL at 10:08

## 2022-01-01 RX ADMIN — ENOXAPARIN SODIUM 40 MG: 40 INJECTION SUBCUTANEOUS at 22:11

## 2022-01-01 RX ADMIN — AMOXICILLIN AND CLAVULANATE POTASSIUM 1 TABLET: 875; 125 TABLET, FILM COATED ORAL at 08:52

## 2022-01-01 RX ADMIN — QUETIAPINE 12.5 MG: 25 TABLET, FILM COATED ORAL at 21:46

## 2022-01-01 RX ADMIN — SENNOSIDES AND DOCUSATE SODIUM 2 TABLET: 8.6; 5 TABLET ORAL at 09:22

## 2022-01-01 RX ADMIN — LISINOPRIL 10 MG: 10 TABLET ORAL at 18:27

## 2022-01-01 RX ADMIN — SPIRONOLACTONE 25 MG: 25 TABLET ORAL at 09:17

## 2022-01-01 RX ADMIN — OLANZAPINE 2.5 MG: 2.5 TABLET, FILM COATED ORAL at 22:04

## 2022-01-01 RX ADMIN — OXYCODONE HYDROCHLORIDE 5 MG: 5 TABLET ORAL at 09:19

## 2022-01-01 RX ADMIN — POTASSIUM CHLORIDE 10 MEQ: 7.46 INJECTION, SOLUTION INTRAVENOUS at 11:54

## 2022-01-01 RX ADMIN — QUETIAPINE FUMARATE 25 MG: 25 TABLET ORAL at 21:04

## 2022-01-01 RX ADMIN — QUETIAPINE 12.5 MG: 25 TABLET, FILM COATED ORAL at 18:24

## 2022-01-01 RX ADMIN — OXYCODONE HYDROCHLORIDE 5 MG: 5 TABLET ORAL at 16:36

## 2022-01-01 RX ADMIN — OXYCODONE HYDROCHLORIDE 5 MG: 5 TABLET ORAL at 06:21

## 2022-01-01 RX ADMIN — QUETIAPINE 12.5 MG: 25 TABLET, FILM COATED ORAL at 22:18

## 2022-01-01 RX ADMIN — LORAZEPAM 0.5 MG: 0.5 TABLET ORAL at 16:45

## 2022-01-01 RX ADMIN — POTASSIUM CHLORIDE 10 MEQ: 7.46 INJECTION, SOLUTION INTRAVENOUS at 08:29

## 2022-01-01 RX ADMIN — FUROSEMIDE 20 MG: 20 TABLET ORAL at 16:06

## 2022-01-01 RX ADMIN — FUROSEMIDE 40 MG: 40 TABLET ORAL at 09:57

## 2022-01-01 RX ADMIN — FLUDROCORTISONE ACETATE 0.1 MG: 0.1 TABLET ORAL at 09:04

## 2022-01-01 RX ADMIN — OLANZAPINE 2.5 MG: 2.5 TABLET, FILM COATED ORAL at 21:17

## 2022-01-01 RX ADMIN — SENNOSIDES AND DOCUSATE SODIUM 1 TABLET: 8.6; 5 TABLET ORAL at 21:37

## 2022-01-01 RX ADMIN — QUETIAPINE FUMARATE 25 MG: 25 TABLET ORAL at 19:39

## 2022-01-01 RX ADMIN — DICLOFENAC SODIUM 2 G: 10 GEL TOPICAL at 08:50

## 2022-01-01 RX ADMIN — ARIPIPRAZOLE 10 MG: 5 TABLET ORAL at 21:02

## 2022-01-01 RX ADMIN — FUROSEMIDE 20 MG: 20 TABLET ORAL at 17:18

## 2022-01-01 RX ADMIN — SERTRALINE HYDROCHLORIDE 50 MG: 50 TABLET ORAL at 10:31

## 2022-01-01 RX ADMIN — SENNOSIDES AND DOCUSATE SODIUM 1 TABLET: 8.6; 5 TABLET ORAL at 20:42

## 2022-01-01 RX ADMIN — MIRTAZAPINE 7.5 MG: 7.5 TABLET, FILM COATED ORAL at 21:16

## 2022-01-01 RX ADMIN — POLYETHYLENE GLYCOL 3350 17 G: 17 POWDER, FOR SOLUTION ORAL at 09:10

## 2022-01-01 RX ADMIN — MICONAZOLE NITRATE: 2 POWDER TOPICAL at 08:33

## 2022-01-01 RX ADMIN — ARIPIPRAZOLE 10 MG: 5 TABLET ORAL at 20:59

## 2022-01-01 RX ADMIN — MICONAZOLE NITRATE: 2 POWDER TOPICAL at 09:23

## 2022-01-01 RX ADMIN — ATORVASTATIN CALCIUM 40 MG: 40 TABLET, FILM COATED ORAL at 22:11

## 2022-01-01 RX ADMIN — FLUDROCORTISONE ACETATE 0.1 MG: 0.1 TABLET ORAL at 10:02

## 2022-01-01 RX ADMIN — POTASSIUM CHLORIDE 20 MEQ: 1500 TABLET, EXTENDED RELEASE ORAL at 08:31

## 2022-01-01 RX ADMIN — POLYETHYLENE GLYCOL 3350 17 G: 17 POWDER, FOR SOLUTION ORAL at 09:49

## 2022-01-01 RX ADMIN — ALTEPLASE 2 MG: 2.2 INJECTION, POWDER, LYOPHILIZED, FOR SOLUTION INTRAVENOUS at 15:35

## 2022-01-01 RX ADMIN — ACETAMINOPHEN 975 MG: 325 TABLET ORAL at 11:12

## 2022-01-01 RX ADMIN — Medication 1 CAPSULE: at 09:32

## 2022-01-01 RX ADMIN — SODIUM PHOSPHATE, MONOBASIC, MONOHYDRATE 15 MMOL: 276; 142 INJECTION, SOLUTION INTRAVENOUS at 21:34

## 2022-01-01 RX ADMIN — SENNOSIDES AND DOCUSATE SODIUM 1 TABLET: 8.6; 5 TABLET ORAL at 20:21

## 2022-01-01 RX ADMIN — Medication 12.5 MG: at 10:20

## 2022-01-01 RX ADMIN — HYDROXYZINE HYDROCHLORIDE 25 MG: 25 TABLET, FILM COATED ORAL at 02:24

## 2022-01-01 RX ADMIN — SODIUM CHLORIDE, SODIUM LACTATE, POTASSIUM CHLORIDE, CALCIUM CHLORIDE AND DEXTROSE MONOHYDRATE: 5; 600; 310; 30; 20 INJECTION, SOLUTION INTRAVENOUS at 07:57

## 2022-01-01 RX ADMIN — ARIPIPRAZOLE 10 MG: 5 TABLET ORAL at 20:38

## 2022-01-01 RX ADMIN — ACETAMINOPHEN 650 MG: 325 TABLET ORAL at 08:48

## 2022-01-01 RX ADMIN — Medication 12.5 MG: at 09:02

## 2022-01-01 RX ADMIN — ENOXAPARIN SODIUM 40 MG: 40 INJECTION SUBCUTANEOUS at 20:06

## 2022-01-01 RX ADMIN — MULTIVITAMIN 15 ML: LIQUID ORAL at 09:24

## 2022-01-01 RX ADMIN — ASPIRIN 325 MG ORAL TABLET 325 MG: 325 PILL ORAL at 08:00

## 2022-01-01 RX ADMIN — ATORVASTATIN CALCIUM 40 MG: 40 TABLET, FILM COATED ORAL at 20:14

## 2022-01-01 RX ADMIN — SODIUM CHLORIDE: 9 INJECTION, SOLUTION INTRAVENOUS at 21:05

## 2022-01-01 RX ADMIN — OLANZAPINE 5 MG: 5 TABLET, ORALLY DISINTEGRATING ORAL at 12:00

## 2022-01-01 RX ADMIN — POTASSIUM CHLORIDE 20 MEQ: 20 SOLUTION ORAL at 12:26

## 2022-01-01 RX ADMIN — FUROSEMIDE 20 MG: 20 TABLET ORAL at 16:12

## 2022-01-01 RX ADMIN — OXYCODONE HYDROCHLORIDE 5 MG: 5 TABLET ORAL at 15:47

## 2022-01-01 RX ADMIN — FUROSEMIDE 20 MG: 20 TABLET ORAL at 15:44

## 2022-01-01 RX ADMIN — ATORVASTATIN CALCIUM 40 MG: 40 TABLET, FILM COATED ORAL at 19:38

## 2022-01-01 RX ADMIN — ASPIRIN 325 MG ORAL TABLET 325 MG: 325 PILL ORAL at 09:47

## 2022-01-01 RX ADMIN — AMOXICILLIN AND CLAVULANATE POTASSIUM 1 TABLET: 875; 125 TABLET, FILM COATED ORAL at 18:19

## 2022-01-01 RX ADMIN — FUROSEMIDE 20 MG: 20 TABLET ORAL at 09:46

## 2022-01-01 RX ADMIN — ARIPIPRAZOLE 10 MG: 5 TABLET ORAL at 20:01

## 2022-01-01 RX ADMIN — POLYETHYLENE GLYCOL 3350 17 G: 17 POWDER, FOR SOLUTION ORAL at 08:51

## 2022-01-01 RX ADMIN — PROPOFOL 30 MCG/KG/MIN: 10 INJECTION, EMULSION INTRAVENOUS at 23:49

## 2022-01-01 RX ADMIN — MICONAZOLE NITRATE: 2 POWDER TOPICAL at 20:34

## 2022-01-01 RX ADMIN — OLANZAPINE 2.5 MG: 2.5 TABLET, FILM COATED ORAL at 21:59

## 2022-01-01 RX ADMIN — DICLOFENAC SODIUM 2 G: 10 GEL TOPICAL at 22:40

## 2022-01-01 RX ADMIN — THERA TABS 1 TABLET: TAB at 17:20

## 2022-01-01 RX ADMIN — HYDROMORPHONE HYDROCHLORIDE 0.2 MG: 0.2 INJECTION, SOLUTION INTRAMUSCULAR; INTRAVENOUS; SUBCUTANEOUS at 07:48

## 2022-01-01 RX ADMIN — ACETAMINOPHEN 975 MG: 325 TABLET ORAL at 01:48

## 2022-01-01 RX ADMIN — Medication 12.5 MG: at 08:52

## 2022-01-01 RX ADMIN — QUETIAPINE FUMARATE 25 MG: 25 TABLET ORAL at 21:44

## 2022-01-01 RX ADMIN — POTASSIUM CHLORIDE 10 MEQ: 7.46 INJECTION, SOLUTION INTRAVENOUS at 19:02

## 2022-01-01 RX ADMIN — FUROSEMIDE 20 MG: 20 TABLET ORAL at 08:32

## 2022-01-01 RX ADMIN — ENOXAPARIN SODIUM 40 MG: 40 INJECTION SUBCUTANEOUS at 21:23

## 2022-01-01 RX ADMIN — POTASSIUM CHLORIDE 40 MEQ: 1500 TABLET, EXTENDED RELEASE ORAL at 13:07

## 2022-01-01 RX ADMIN — QUETIAPINE 12.5 MG: 25 TABLET, FILM COATED ORAL at 08:45

## 2022-01-01 RX ADMIN — ENOXAPARIN SODIUM 40 MG: 40 INJECTION SUBCUTANEOUS at 06:33

## 2022-01-01 RX ADMIN — ENOXAPARIN SODIUM 40 MG: 40 INJECTION SUBCUTANEOUS at 20:24

## 2022-01-01 RX ADMIN — ACETAMINOPHEN 975 MG: 325 TABLET ORAL at 16:35

## 2022-01-01 RX ADMIN — MICONAZOLE NITRATE: 2 POWDER TOPICAL at 08:43

## 2022-01-01 RX ADMIN — SODIUM CHLORIDE 1000 ML: 9 INJECTION, SOLUTION INTRAVENOUS at 12:55

## 2022-01-01 RX ADMIN — MICONAZOLE NITRATE: 2 POWDER TOPICAL at 08:53

## 2022-01-01 RX ADMIN — FENTANYL CITRATE 50 MCG: 50 INJECTION, SOLUTION INTRAMUSCULAR; INTRAVENOUS at 09:46

## 2022-01-01 RX ADMIN — ENOXAPARIN SODIUM 40 MG: 40 INJECTION SUBCUTANEOUS at 09:12

## 2022-01-01 RX ADMIN — LISINOPRIL 10 MG: 10 TABLET ORAL at 11:02

## 2022-01-01 RX ADMIN — OLANZAPINE 2.5 MG: 2.5 TABLET, FILM COATED ORAL at 21:55

## 2022-01-01 RX ADMIN — ASPIRIN 325 MG ORAL TABLET 325 MG: 325 PILL ORAL at 08:56

## 2022-01-01 RX ADMIN — FUROSEMIDE 20 MG: 20 TABLET ORAL at 15:01

## 2022-01-01 RX ADMIN — POLYETHYLENE GLYCOL 3350 17 G: 17 POWDER, FOR SOLUTION ORAL at 10:00

## 2022-01-01 RX ADMIN — FUROSEMIDE 40 MG: 40 TABLET ORAL at 09:08

## 2022-01-01 RX ADMIN — OXYCODONE HYDROCHLORIDE 5 MG: 5 TABLET ORAL at 05:39

## 2022-01-01 RX ADMIN — QUETIAPINE 12.5 MG: 25 TABLET, FILM COATED ORAL at 18:19

## 2022-01-01 RX ADMIN — OXYCODONE HYDROCHLORIDE 5 MG: 5 TABLET ORAL at 01:17

## 2022-01-01 RX ADMIN — QUETIAPINE FUMARATE 25 MG: 25 TABLET ORAL at 21:05

## 2022-01-01 RX ADMIN — OXYCODONE HYDROCHLORIDE 5 MG: 5 TABLET ORAL at 00:20

## 2022-01-01 RX ADMIN — FUROSEMIDE 20 MG: 20 TABLET ORAL at 17:34

## 2022-01-01 RX ADMIN — Medication 12.5 MG: at 11:06

## 2022-01-01 RX ADMIN — FLUDROCORTISONE ACETATE 0.1 MG: 0.1 TABLET ORAL at 08:47

## 2022-01-01 RX ADMIN — FUROSEMIDE 20 MG: 20 TABLET ORAL at 17:44

## 2022-01-01 RX ADMIN — Medication 1 CAPSULE: at 20:31

## 2022-01-01 RX ADMIN — ATORVASTATIN CALCIUM 40 MG: 40 TABLET, FILM COATED ORAL at 21:31

## 2022-01-01 RX ADMIN — FUROSEMIDE 40 MG: 40 TABLET ORAL at 09:43

## 2022-01-01 RX ADMIN — POLYETHYLENE GLYCOL 3350 17 G: 17 POWDER, FOR SOLUTION ORAL at 09:44

## 2022-01-01 RX ADMIN — CLINDAMYCIN PHOSPHATE 900 MG: 900 INJECTION, SOLUTION INTRAVENOUS at 19:31

## 2022-01-01 RX ADMIN — ACETAMINOPHEN 650 MG: 325 TABLET ORAL at 19:53

## 2022-01-01 RX ADMIN — Medication 1 CAPSULE: at 21:10

## 2022-01-01 RX ADMIN — SENNOSIDES AND DOCUSATE SODIUM 2 TABLET: 8.6; 5 TABLET ORAL at 10:15

## 2022-01-01 RX ADMIN — CEFTRIAXONE SODIUM 2 G: 2 INJECTION, POWDER, FOR SOLUTION INTRAMUSCULAR; INTRAVENOUS at 13:09

## 2022-01-01 RX ADMIN — MICONAZOLE NITRATE: 2 POWDER TOPICAL at 08:48

## 2022-01-01 RX ADMIN — LISINOPRIL 10 MG: 10 TABLET ORAL at 08:25

## 2022-01-01 RX ADMIN — ENOXAPARIN SODIUM 40 MG: 40 INJECTION SUBCUTANEOUS at 09:15

## 2022-01-01 RX ADMIN — ASPIRIN 325 MG ORAL TABLET 325 MG: 325 PILL ORAL at 09:17

## 2022-01-01 RX ADMIN — ENOXAPARIN SODIUM 40 MG: 40 INJECTION SUBCUTANEOUS at 06:26

## 2022-01-01 RX ADMIN — DICLOFENAC SODIUM 2 G: 10 GEL TOPICAL at 18:07

## 2022-01-01 RX ADMIN — OXYCODONE HYDROCHLORIDE 5 MG: 5 TABLET ORAL at 08:54

## 2022-01-01 RX ADMIN — SODIUM CHLORIDE: 9 INJECTION, SOLUTION INTRAVENOUS at 03:00

## 2022-01-01 RX ADMIN — MICONAZOLE NITRATE: 2 POWDER TOPICAL at 20:29

## 2022-01-01 RX ADMIN — ARIPIPRAZOLE 10 MG: 5 TABLET ORAL at 20:23

## 2022-01-01 RX ADMIN — LORAZEPAM 0.5 MG: 2 INJECTION INTRAMUSCULAR at 00:42

## 2022-01-01 RX ADMIN — MICONAZOLE NITRATE: 2 POWDER TOPICAL at 11:11

## 2022-01-01 RX ADMIN — MICONAZOLE NITRATE: 2 POWDER TOPICAL at 08:23

## 2022-01-01 RX ADMIN — MICONAZOLE NITRATE: 2 POWDER TOPICAL at 21:12

## 2022-01-01 RX ADMIN — QUETIAPINE 12.5 MG: 25 TABLET, FILM COATED ORAL at 19:01

## 2022-01-01 RX ADMIN — SENNOSIDES AND DOCUSATE SODIUM 1 TABLET: 8.6; 5 TABLET ORAL at 09:23

## 2022-01-01 RX ADMIN — PROPOFOL 40 MCG/KG/MIN: 10 INJECTION, EMULSION INTRAVENOUS at 18:25

## 2022-01-01 RX ADMIN — PIPERACILLIN AND TAZOBACTAM 3.38 G: 3; .375 INJECTION, POWDER, FOR SOLUTION INTRAVENOUS at 00:03

## 2022-01-01 RX ADMIN — OXYCODONE HYDROCHLORIDE 5 MG: 5 TABLET ORAL at 05:38

## 2022-01-01 RX ADMIN — ARIPIPRAZOLE 10 MG: 5 TABLET ORAL at 21:17

## 2022-01-01 RX ADMIN — ACETAMINOPHEN 975 MG: 325 TABLET ORAL at 05:06

## 2022-01-01 RX ADMIN — ASPIRIN 325 MG ORAL TABLET 325 MG: 325 PILL ORAL at 09:29

## 2022-01-01 RX ADMIN — POTASSIUM CHLORIDE 40 MEQ: 1500 TABLET, EXTENDED RELEASE ORAL at 10:20

## 2022-01-01 RX ADMIN — OLANZAPINE 5 MG: 5 TABLET, ORALLY DISINTEGRATING ORAL at 20:27

## 2022-01-01 RX ADMIN — SENNOSIDES AND DOCUSATE SODIUM 1 TABLET: 8.6; 5 TABLET ORAL at 21:06

## 2022-01-01 RX ADMIN — OLANZAPINE 1.25 MG: 10 INJECTION, POWDER, FOR SOLUTION INTRAMUSCULAR at 21:16

## 2022-01-01 RX ADMIN — POTASSIUM CHLORIDE 20 MEQ: 1.5 POWDER, FOR SOLUTION ORAL at 08:51

## 2022-01-01 RX ADMIN — ENOXAPARIN SODIUM 40 MG: 40 INJECTION SUBCUTANEOUS at 09:53

## 2022-01-01 RX ADMIN — Medication 1 CAPSULE: at 20:13

## 2022-01-01 RX ADMIN — DICLOFENAC SODIUM 2 G: 10 GEL TOPICAL at 13:42

## 2022-01-01 RX ADMIN — POLYETHYLENE GLYCOL 3350 17 G: 17 POWDER, FOR SOLUTION ORAL at 08:50

## 2022-01-01 RX ADMIN — SENNOSIDES AND DOCUSATE SODIUM 1 TABLET: 8.6; 5 TABLET ORAL at 08:32

## 2022-01-01 RX ADMIN — SENNOSIDES AND DOCUSATE SODIUM 1 TABLET: 8.6; 5 TABLET ORAL at 08:39

## 2022-01-01 RX ADMIN — Medication 12.5 MG: at 09:37

## 2022-01-01 RX ADMIN — OLANZAPINE 5 MG: 5 TABLET, ORALLY DISINTEGRATING ORAL at 16:17

## 2022-01-01 RX ADMIN — LISINOPRIL 10 MG: 10 TABLET ORAL at 09:06

## 2022-01-01 RX ADMIN — DICLOFENAC SODIUM 2 G: 10 GEL TOPICAL at 21:44

## 2022-01-01 RX ADMIN — AMOXICILLIN AND CLAVULANATE POTASSIUM 1 TABLET: 875; 125 TABLET, FILM COATED ORAL at 19:01

## 2022-01-01 RX ADMIN — ARIPIPRAZOLE 10 MG: 5 TABLET ORAL at 20:49

## 2022-01-01 RX ADMIN — SODIUM CHLORIDE, SODIUM LACTATE, POTASSIUM CHLORIDE, CALCIUM CHLORIDE AND DEXTROSE MONOHYDRATE: 5; 600; 310; 30; 20 INJECTION, SOLUTION INTRAVENOUS at 17:04

## 2022-01-01 RX ADMIN — OXYCODONE HYDROCHLORIDE 5 MG: 5 TABLET ORAL at 16:48

## 2022-01-01 RX ADMIN — ACETAMINOPHEN 650 MG: 325 TABLET ORAL at 19:58

## 2022-01-01 RX ADMIN — ASPIRIN 325 MG ORAL TABLET 325 MG: 325 PILL ORAL at 08:25

## 2022-01-01 RX ADMIN — DICLOFENAC SODIUM 2 G: 10 GEL TOPICAL at 13:53

## 2022-01-01 RX ADMIN — DICLOFENAC SODIUM 2 G: 10 GEL TOPICAL at 17:45

## 2022-01-01 RX ADMIN — MICONAZOLE NITRATE: 2 POWDER TOPICAL at 11:35

## 2022-01-01 RX ADMIN — SENNOSIDES AND DOCUSATE SODIUM 1 TABLET: 8.6; 5 TABLET ORAL at 08:46

## 2022-01-01 RX ADMIN — SENNOSIDES AND DOCUSATE SODIUM 1 TABLET: 8.6; 5 TABLET ORAL at 21:14

## 2022-01-01 RX ADMIN — FUROSEMIDE 20 MG: 20 TABLET ORAL at 08:00

## 2022-01-01 RX ADMIN — ACETAMINOPHEN 975 MG: 325 TABLET ORAL at 17:45

## 2022-01-01 RX ADMIN — ASPIRIN 325 MG ORAL TABLET 325 MG: 325 PILL ORAL at 09:08

## 2022-01-01 RX ADMIN — DICLOFENAC SODIUM 2 G: 10 GEL TOPICAL at 17:14

## 2022-01-01 RX ADMIN — HYDROMORPHONE HYDROCHLORIDE 0.1 MG: 0.2 INJECTION, SOLUTION INTRAMUSCULAR; INTRAVENOUS; SUBCUTANEOUS at 19:54

## 2022-01-01 RX ADMIN — FUROSEMIDE 40 MG: 40 TABLET ORAL at 11:02

## 2022-01-01 RX ADMIN — ENOXAPARIN SODIUM 40 MG: 40 INJECTION SUBCUTANEOUS at 09:09

## 2022-01-01 RX ADMIN — POLYETHYLENE GLYCOL 3350 17 G: 17 POWDER, FOR SOLUTION ORAL at 08:46

## 2022-01-01 RX ADMIN — OXYCODONE HYDROCHLORIDE 5 MG: 5 TABLET ORAL at 15:42

## 2022-01-01 RX ADMIN — PRAMOXINE HYDROCHLORIDE: 10 LOTION TOPICAL at 22:59

## 2022-01-01 RX ADMIN — QUETIAPINE FUMARATE 25 MG: 25 TABLET ORAL at 21:08

## 2022-01-01 RX ADMIN — POTASSIUM CHLORIDE 10 MEQ: 7.46 INJECTION, SOLUTION INTRAVENOUS at 22:55

## 2022-01-01 RX ADMIN — ACETAMINOPHEN 650 MG: 325 TABLET ORAL at 20:20

## 2022-01-01 RX ADMIN — LISINOPRIL 10 MG: 10 TABLET ORAL at 09:51

## 2022-01-01 RX ADMIN — ATORVASTATIN CALCIUM 40 MG: 40 TABLET, FILM COATED ORAL at 20:57

## 2022-01-01 RX ADMIN — SENNOSIDES AND DOCUSATE SODIUM 1 TABLET: 8.6; 5 TABLET ORAL at 20:26

## 2022-01-01 RX ADMIN — ENOXAPARIN SODIUM 40 MG: 40 INJECTION SUBCUTANEOUS at 17:27

## 2022-01-01 RX ADMIN — Medication 12.5 MG: at 09:19

## 2022-01-01 RX ADMIN — THERA TABS 1 TABLET: TAB at 16:41

## 2022-01-01 RX ADMIN — MICONAZOLE NITRATE: 2 POWDER TOPICAL at 20:46

## 2022-01-01 RX ADMIN — QUETIAPINE 12.5 MG: 25 TABLET, FILM COATED ORAL at 05:49

## 2022-01-01 RX ADMIN — POLYETHYLENE GLYCOL 3350 17 G: 17 POWDER, FOR SOLUTION ORAL at 08:02

## 2022-01-01 RX ADMIN — MICONAZOLE NITRATE: 2 POWDER TOPICAL at 10:05

## 2022-01-01 RX ADMIN — ENOXAPARIN SODIUM 40 MG: 40 INJECTION SUBCUTANEOUS at 21:25

## 2022-01-01 RX ADMIN — OXYCODONE HYDROCHLORIDE 5 MG: 5 TABLET ORAL at 16:32

## 2022-01-01 RX ADMIN — SODIUM CHLORIDE, POTASSIUM CHLORIDE, SODIUM LACTATE AND CALCIUM CHLORIDE 500 ML: 600; 310; 30; 20 INJECTION, SOLUTION INTRAVENOUS at 11:00

## 2022-01-01 RX ADMIN — ARIPIPRAZOLE 10 MG: 5 TABLET ORAL at 20:56

## 2022-01-01 RX ADMIN — POTASSIUM CHLORIDE 10 MEQ: 7.46 INJECTION, SOLUTION INTRAVENOUS at 12:42

## 2022-01-01 RX ADMIN — SERTRALINE HYDROCHLORIDE 50 MG: 50 TABLET ORAL at 11:14

## 2022-01-01 RX ADMIN — POTASSIUM CHLORIDE 20 MEQ: 29.8 INJECTION, SOLUTION INTRAVENOUS at 10:32

## 2022-01-01 RX ADMIN — ASPIRIN 325 MG ORAL TABLET 325 MG: 325 PILL ORAL at 09:34

## 2022-01-01 RX ADMIN — POTASSIUM CHLORIDE 20 MEQ: 1500 TABLET, EXTENDED RELEASE ORAL at 20:05

## 2022-01-01 RX ADMIN — ENOXAPARIN SODIUM 40 MG: 40 INJECTION SUBCUTANEOUS at 21:21

## 2022-01-01 RX ADMIN — CLINDAMYCIN IN 5 PERCENT DEXTROSE 900 MG: 18 INJECTION, SOLUTION INTRAVENOUS at 02:44

## 2022-01-01 RX ADMIN — ATORVASTATIN CALCIUM 40 MG: 40 TABLET, FILM COATED ORAL at 21:10

## 2022-01-01 RX ADMIN — ACETAMINOPHEN 975 MG: 325 TABLET ORAL at 20:28

## 2022-01-01 RX ADMIN — ENOXAPARIN SODIUM 40 MG: 40 INJECTION SUBCUTANEOUS at 08:32

## 2022-01-01 RX ADMIN — MORPHINE SULFATE 10 MG: 100 SOLUTION ORAL at 05:05

## 2022-01-01 RX ADMIN — ACETAMINOPHEN 975 MG: 325 TABLET ORAL at 20:27

## 2022-01-01 RX ADMIN — ASPIRIN 325 MG ORAL TABLET 325 MG: 325 PILL ORAL at 10:20

## 2022-01-01 RX ADMIN — HYDROMORPHONE HYDROCHLORIDE 0.1 MG: 0.2 INJECTION, SOLUTION INTRAMUSCULAR; INTRAVENOUS; SUBCUTANEOUS at 15:03

## 2022-01-01 RX ADMIN — ENOXAPARIN SODIUM 40 MG: 40 INJECTION SUBCUTANEOUS at 05:23

## 2022-01-01 RX ADMIN — DICLOFENAC SODIUM 2 G: 10 GEL TOPICAL at 18:16

## 2022-01-01 RX ADMIN — ATORVASTATIN CALCIUM 40 MG: 40 TABLET, FILM COATED ORAL at 20:38

## 2022-01-01 RX ADMIN — MICONAZOLE NITRATE: 2 POWDER TOPICAL at 21:38

## 2022-01-01 RX ADMIN — CLINDAMYCIN HYDROCHLORIDE 300 MG: 300 CAPSULE ORAL at 12:06

## 2022-01-01 RX ADMIN — LISINOPRIL 10 MG: 10 TABLET ORAL at 08:50

## 2022-01-01 RX ADMIN — ATORVASTATIN CALCIUM 40 MG: 40 TABLET, FILM COATED ORAL at 21:35

## 2022-01-01 RX ADMIN — MICONAZOLE NITRATE: 2 POWDER TOPICAL at 21:00

## 2022-01-01 RX ADMIN — SERTRALINE HYDROCHLORIDE 50 MG: 50 TABLET ORAL at 09:24

## 2022-01-01 RX ADMIN — CEFTRIAXONE SODIUM 2 G: 2 INJECTION, POWDER, FOR SOLUTION INTRAMUSCULAR; INTRAVENOUS at 12:21

## 2022-01-01 RX ADMIN — PRAMOXINE HYDROCHLORIDE: 10 LOTION TOPICAL at 16:25

## 2022-01-01 RX ADMIN — MORPHINE SULFATE 10 MG: 100 SOLUTION ORAL at 03:19

## 2022-01-01 RX ADMIN — POTASSIUM CHLORIDE 10 MEQ: 7.46 INJECTION, SOLUTION INTRAVENOUS at 06:09

## 2022-01-01 RX ADMIN — ACETAMINOPHEN 975 MG: 325 TABLET ORAL at 04:42

## 2022-01-01 RX ADMIN — ASPIRIN 325 MG ORAL TABLET 325 MG: 325 PILL ORAL at 09:18

## 2022-01-01 RX ADMIN — FUROSEMIDE 20 MG: 20 TABLET ORAL at 15:16

## 2022-01-01 RX ADMIN — POTASSIUM CHLORIDE 20 MEQ: 1500 TABLET, EXTENDED RELEASE ORAL at 16:46

## 2022-01-01 RX ADMIN — ENOXAPARIN SODIUM 40 MG: 40 INJECTION SUBCUTANEOUS at 08:37

## 2022-01-01 RX ADMIN — DICLOFENAC SODIUM 2 G: 10 GEL TOPICAL at 09:34

## 2022-01-01 RX ADMIN — LISINOPRIL 10 MG: 10 TABLET ORAL at 08:01

## 2022-01-01 RX ADMIN — OXYCODONE HYDROCHLORIDE 2.5 MG: 5 TABLET ORAL at 16:17

## 2022-01-01 RX ADMIN — MICONAZOLE NITRATE: 2 POWDER TOPICAL at 21:28

## 2022-01-01 RX ADMIN — ENOXAPARIN SODIUM 40 MG: 40 INJECTION SUBCUTANEOUS at 10:07

## 2022-01-01 RX ADMIN — DICLOFENAC SODIUM 2 G: 10 GEL TOPICAL at 12:21

## 2022-01-01 RX ADMIN — ASPIRIN 325 MG ORAL TABLET 325 MG: 325 PILL ORAL at 09:02

## 2022-01-01 RX ADMIN — SERTRALINE HYDROCHLORIDE 50 MG: 50 TABLET ORAL at 08:25

## 2022-01-01 RX ADMIN — CLINDAMYCIN HYDROCHLORIDE 300 MG: 300 CAPSULE ORAL at 00:11

## 2022-01-01 RX ADMIN — DICLOFENAC SODIUM 2 G: 10 GEL TOPICAL at 22:46

## 2022-01-01 RX ADMIN — Medication 0.06 MCG/KG/MIN: at 07:24

## 2022-01-01 RX ADMIN — POTASSIUM CHLORIDE 20 MEQ: 1.5 POWDER, FOR SOLUTION ORAL at 22:14

## 2022-01-01 RX ADMIN — FUROSEMIDE 20 MG: 20 TABLET ORAL at 16:40

## 2022-01-01 RX ADMIN — POTASSIUM CHLORIDE 20 MEQ: 1.5 POWDER, FOR SOLUTION ORAL at 11:14

## 2022-01-01 RX ADMIN — PRAMOXINE HYDROCHLORIDE: 10 LOTION TOPICAL at 09:46

## 2022-01-01 RX ADMIN — MICONAZOLE NITRATE: 2 POWDER TOPICAL at 09:37

## 2022-01-01 RX ADMIN — SENNOSIDES AND DOCUSATE SODIUM 1 TABLET: 8.6; 5 TABLET ORAL at 09:53

## 2022-01-01 RX ADMIN — SENNOSIDES AND DOCUSATE SODIUM 1 TABLET: 8.6; 5 TABLET ORAL at 09:44

## 2022-01-01 RX ADMIN — AMOXICILLIN AND CLAVULANATE POTASSIUM 1 TABLET: 875; 125 TABLET, FILM COATED ORAL at 09:09

## 2022-01-01 RX ADMIN — OXYCODONE HYDROCHLORIDE 5 MG: 5 TABLET ORAL at 15:41

## 2022-01-01 RX ADMIN — SENNOSIDES AND DOCUSATE SODIUM 1 TABLET: 8.6; 5 TABLET ORAL at 20:14

## 2022-01-01 RX ADMIN — MICONAZOLE NITRATE: 2 POWDER TOPICAL at 21:39

## 2022-01-01 RX ADMIN — MICONAZOLE NITRATE: 2 POWDER TOPICAL at 21:07

## 2022-01-01 RX ADMIN — ATORVASTATIN CALCIUM 40 MG: 40 TABLET, FILM COATED ORAL at 20:05

## 2022-01-01 RX ADMIN — DICLOFENAC SODIUM 2 G: 10 GEL TOPICAL at 21:36

## 2022-01-01 RX ADMIN — POTASSIUM CHLORIDE 20 MEQ: 1500 TABLET, EXTENDED RELEASE ORAL at 09:26

## 2022-01-01 RX ADMIN — LISINOPRIL 10 MG: 10 TABLET ORAL at 11:14

## 2022-01-01 RX ADMIN — Medication 12.5 MG: at 09:13

## 2022-01-01 RX ADMIN — SENNOSIDES AND DOCUSATE SODIUM 1 TABLET: 8.6; 5 TABLET ORAL at 20:28

## 2022-01-01 RX ADMIN — SERTRALINE HYDROCHLORIDE 25 MG: 25 TABLET ORAL at 08:52

## 2022-01-01 RX ADMIN — OXYCODONE HYDROCHLORIDE 5 MG: 5 TABLET ORAL at 12:01

## 2022-01-01 RX ADMIN — SODIUM CHLORIDE, SODIUM LACTATE, POTASSIUM CHLORIDE, CALCIUM CHLORIDE AND DEXTROSE MONOHYDRATE: 5; 600; 310; 30; 20 INJECTION, SOLUTION INTRAVENOUS at 02:11

## 2022-01-01 RX ADMIN — ENOXAPARIN SODIUM 40 MG: 40 INJECTION SUBCUTANEOUS at 07:04

## 2022-01-01 RX ADMIN — SENNOSIDES AND DOCUSATE SODIUM 1 TABLET: 8.6; 5 TABLET ORAL at 08:36

## 2022-01-01 RX ADMIN — ACETAMINOPHEN 975 MG: 325 TABLET ORAL at 08:01

## 2022-01-01 RX ADMIN — FUROSEMIDE 40 MG: 40 TABLET ORAL at 09:10

## 2022-01-01 RX ADMIN — MICONAZOLE NITRATE: 2 POWDER TOPICAL at 09:32

## 2022-01-01 RX ADMIN — ACETAMINOPHEN 975 MG: 325 TABLET ORAL at 04:29

## 2022-01-01 RX ADMIN — SENNOSIDES AND DOCUSATE SODIUM 1 TABLET: 8.6; 5 TABLET ORAL at 10:10

## 2022-01-01 RX ADMIN — CARBIDOPA AND LEVODOPA 2.5 MG: 50; 200 TABLET, EXTENDED RELEASE ORAL at 08:26

## 2022-01-01 RX ADMIN — POTASSIUM CHLORIDE 40 MEQ: 20 SOLUTION ORAL at 11:21

## 2022-01-01 RX ADMIN — ACETAMINOPHEN 975 MG: 325 TABLET ORAL at 09:03

## 2022-01-01 RX ADMIN — POTASSIUM CHLORIDE 20 MEQ: 1500 TABLET, EXTENDED RELEASE ORAL at 09:22

## 2022-01-01 RX ADMIN — MICONAZOLE NITRATE: 2 POWDER TOPICAL at 20:37

## 2022-01-01 RX ADMIN — QUETIAPINE 12.5 MG: 25 TABLET, FILM COATED ORAL at 17:20

## 2022-01-01 RX ADMIN — Medication 12.5 MG: at 08:47

## 2022-01-01 RX ADMIN — ENOXAPARIN SODIUM 40 MG: 40 INJECTION SUBCUTANEOUS at 21:49

## 2022-01-01 RX ADMIN — OXYCODONE HYDROCHLORIDE 5 MG: 5 TABLET ORAL at 03:09

## 2022-01-01 RX ADMIN — MICONAZOLE NITRATE: 2 POWDER TOPICAL at 11:29

## 2022-01-01 RX ADMIN — FUROSEMIDE 20 MG: 20 TABLET ORAL at 17:21

## 2022-01-01 RX ADMIN — ARIPIPRAZOLE 10 MG: 5 TABLET ORAL at 20:14

## 2022-01-01 RX ADMIN — ASPIRIN 325 MG ORAL TABLET 325 MG: 325 PILL ORAL at 10:24

## 2022-01-01 RX ADMIN — SENNOSIDES AND DOCUSATE SODIUM 2 TABLET: 8.6; 5 TABLET ORAL at 09:12

## 2022-01-01 RX ADMIN — ACETAMINOPHEN 975 MG: 325 TABLET ORAL at 07:34

## 2022-01-01 RX ADMIN — ENOXAPARIN SODIUM 40 MG: 40 INJECTION SUBCUTANEOUS at 09:20

## 2022-01-01 RX ADMIN — ASPIRIN 325 MG ORAL TABLET 325 MG: 325 PILL ORAL at 08:31

## 2022-01-01 RX ADMIN — ENOXAPARIN SODIUM 40 MG: 40 INJECTION SUBCUTANEOUS at 18:20

## 2022-01-01 RX ADMIN — MEDROXYPROGESTERONE ACETATE 10 MG: 10 TABLET ORAL at 09:19

## 2022-01-01 RX ADMIN — CLINDAMYCIN IN 5 PERCENT DEXTROSE 900 MG: 18 INJECTION, SOLUTION INTRAVENOUS at 17:56

## 2022-01-01 RX ADMIN — LISINOPRIL 10 MG: 10 TABLET ORAL at 09:43

## 2022-01-01 RX ADMIN — POTASSIUM CHLORIDE 10 MEQ: 7.46 INJECTION, SOLUTION INTRAVENOUS at 20:36

## 2022-01-01 RX ADMIN — OXYCODONE HYDROCHLORIDE 5 MG: 5 TABLET ORAL at 14:43

## 2022-01-01 RX ADMIN — FUROSEMIDE 20 MG: 20 TABLET ORAL at 08:39

## 2022-01-01 RX ADMIN — MORPHINE SULFATE 10 MG: 100 SOLUTION ORAL at 21:21

## 2022-01-01 RX ADMIN — Medication 1 CAPSULE: at 21:39

## 2022-01-01 RX ADMIN — MICONAZOLE NITRATE: 2 POWDER TOPICAL at 20:50

## 2022-01-01 RX ADMIN — QUETIAPINE FUMARATE 25 MG: 25 TABLET ORAL at 21:53

## 2022-01-01 RX ADMIN — MICONAZOLE NITRATE: 2 POWDER TOPICAL at 20:52

## 2022-01-01 RX ADMIN — HYDROXYZINE HYDROCHLORIDE 50 MG: 25 TABLET ORAL at 17:47

## 2022-01-01 RX ADMIN — SENNOSIDES AND DOCUSATE SODIUM 1 TABLET: 8.6; 5 TABLET ORAL at 09:27

## 2022-01-01 RX ADMIN — ENOXAPARIN SODIUM 40 MG: 40 INJECTION SUBCUTANEOUS at 18:22

## 2022-01-01 RX ADMIN — ACETAMINOPHEN 650 MG: 325 TABLET ORAL at 21:07

## 2022-01-01 RX ADMIN — MICONAZOLE NITRATE: 2 POWDER TOPICAL at 09:22

## 2022-01-01 RX ADMIN — OXYCODONE HYDROCHLORIDE 2.5 MG: 5 TABLET ORAL at 01:17

## 2022-01-01 RX ADMIN — ARIPIPRAZOLE 10 MG: 5 TABLET ORAL at 20:55

## 2022-01-01 RX ADMIN — MICONAZOLE NITRATE: 2 POWDER TOPICAL at 09:59

## 2022-01-01 RX ADMIN — ASPIRIN 325 MG ORAL TABLET 325 MG: 325 PILL ORAL at 08:01

## 2022-01-01 RX ADMIN — SENNOSIDES AND DOCUSATE SODIUM 1 TABLET: 8.6; 5 TABLET ORAL at 11:02

## 2022-01-01 RX ADMIN — LISINOPRIL 10 MG: 10 TABLET ORAL at 09:59

## 2022-01-01 RX ADMIN — OXYCODONE HYDROCHLORIDE 5 MG: 5 TABLET ORAL at 05:00

## 2022-01-01 RX ADMIN — POLYETHYLENE GLYCOL 3350 17 G: 17 POWDER, FOR SOLUTION ORAL at 09:35

## 2022-01-01 RX ADMIN — ASPIRIN 325 MG ORAL TABLET 325 MG: 325 PILL ORAL at 09:33

## 2022-01-01 RX ADMIN — PRAMOXINE HYDROCHLORIDE: 10 LOTION TOPICAL at 09:19

## 2022-01-01 RX ADMIN — PRAMOXINE HYDROCHLORIDE: 10 LOTION TOPICAL at 08:11

## 2022-01-01 RX ADMIN — SENNOSIDES AND DOCUSATE SODIUM 1 TABLET: 8.6; 5 TABLET ORAL at 20:00

## 2022-01-01 RX ADMIN — ENOXAPARIN SODIUM 40 MG: 40 INJECTION SUBCUTANEOUS at 08:00

## 2022-01-01 RX ADMIN — QUETIAPINE 12.5 MG: 25 TABLET, FILM COATED ORAL at 16:36

## 2022-01-01 RX ADMIN — SODIUM CHLORIDE: 9 INJECTION, SOLUTION INTRAVENOUS at 21:42

## 2022-01-01 RX ADMIN — FUROSEMIDE 20 MG: 20 TABLET ORAL at 17:56

## 2022-01-01 RX ADMIN — DICLOFENAC SODIUM 2 G: 10 GEL TOPICAL at 10:05

## 2022-01-01 RX ADMIN — MICONAZOLE NITRATE: 2 POWDER TOPICAL at 21:48

## 2022-01-01 RX ADMIN — MICONAZOLE NITRATE: 2 POWDER TOPICAL at 20:49

## 2022-01-01 RX ADMIN — ARIPIPRAZOLE 10 MG: 5 TABLET ORAL at 20:50

## 2022-01-01 RX ADMIN — SENNOSIDES AND DOCUSATE SODIUM 1 TABLET: 8.6; 5 TABLET ORAL at 09:57

## 2022-01-01 RX ADMIN — HYDROMORPHONE HYDROCHLORIDE 0.1 MG: 0.2 INJECTION, SOLUTION INTRAMUSCULAR; INTRAVENOUS; SUBCUTANEOUS at 08:38

## 2022-01-01 RX ADMIN — ARIPIPRAZOLE 10 MG: 5 TABLET ORAL at 21:38

## 2022-01-01 RX ADMIN — ATORVASTATIN CALCIUM 40 MG: 40 TABLET, FILM COATED ORAL at 21:16

## 2022-01-01 RX ADMIN — ATORVASTATIN CALCIUM 40 MG: 40 TABLET, FILM COATED ORAL at 21:06

## 2022-01-01 RX ADMIN — Medication 1 CAPSULE: at 09:47

## 2022-01-01 RX ADMIN — SERTRALINE HYDROCHLORIDE 50 MG: 50 TABLET ORAL at 08:57

## 2022-01-01 RX ADMIN — LISINOPRIL 10 MG: 10 TABLET ORAL at 08:43

## 2022-01-01 RX ADMIN — SENNOSIDES AND DOCUSATE SODIUM 2 TABLET: 8.6; 5 TABLET ORAL at 21:02

## 2022-01-01 RX ADMIN — DICLOFENAC SODIUM 2 G: 10 GEL TOPICAL at 09:24

## 2022-01-01 RX ADMIN — PROPOFOL 25 MCG/KG/MIN: 10 INJECTION, EMULSION INTRAVENOUS at 09:38

## 2022-01-01 RX ADMIN — LISINOPRIL 10 MG: 10 TABLET ORAL at 09:34

## 2022-01-01 RX ADMIN — AMOXICILLIN AND CLAVULANATE POTASSIUM 1 TABLET: 875; 125 TABLET, FILM COATED ORAL at 09:43

## 2022-01-01 RX ADMIN — ACETAMINOPHEN 650 MG: 325 TABLET ORAL at 03:57

## 2022-01-01 RX ADMIN — ACETAMINOPHEN 975 MG: 325 TABLET ORAL at 17:48

## 2022-01-01 RX ADMIN — SENNOSIDES AND DOCUSATE SODIUM 1 TABLET: 8.6; 5 TABLET ORAL at 09:47

## 2022-01-01 RX ADMIN — CLINDAMYCIN HYDROCHLORIDE 300 MG: 300 CAPSULE ORAL at 23:25

## 2022-01-01 RX ADMIN — PRAMOXINE HYDROCHLORIDE: 10 LOTION TOPICAL at 12:23

## 2022-01-01 RX ADMIN — ARIPIPRAZOLE 10 MG: 5 TABLET ORAL at 21:00

## 2022-01-01 RX ADMIN — ENOXAPARIN SODIUM 40 MG: 40 INJECTION SUBCUTANEOUS at 18:40

## 2022-01-01 RX ADMIN — SERTRALINE HYDROCHLORIDE 50 MG: 50 TABLET ORAL at 09:01

## 2022-01-01 RX ADMIN — AMOXICILLIN AND CLAVULANATE POTASSIUM 1 TABLET: 875; 125 TABLET, FILM COATED ORAL at 08:53

## 2022-01-01 RX ADMIN — ATORVASTATIN CALCIUM 40 MG: 40 TABLET, FILM COATED ORAL at 20:55

## 2022-01-01 RX ADMIN — FLUDROCORTISONE ACETATE 0.1 MG: 0.1 TABLET ORAL at 08:53

## 2022-01-01 RX ADMIN — SENNOSIDES AND DOCUSATE SODIUM 1 TABLET: 8.6; 5 TABLET ORAL at 10:40

## 2022-01-01 RX ADMIN — ACETAMINOPHEN 975 MG: 325 TABLET ORAL at 20:37

## 2022-01-01 RX ADMIN — ATORVASTATIN CALCIUM 40 MG: 40 TABLET, FILM COATED ORAL at 20:48

## 2022-01-01 RX ADMIN — FUROSEMIDE 20 MG: 20 TABLET ORAL at 09:00

## 2022-01-01 RX ADMIN — MICONAZOLE NITRATE: 2 POWDER TOPICAL at 21:42

## 2022-01-01 RX ADMIN — PRAMOXINE HYDROCHLORIDE: 10 LOTION TOPICAL at 18:27

## 2022-01-01 RX ADMIN — POTASSIUM CHLORIDE 10 MEQ: 7.46 INJECTION, SOLUTION INTRAVENOUS at 17:08

## 2022-01-01 RX ADMIN — OLANZAPINE 5 MG: 5 TABLET, ORALLY DISINTEGRATING ORAL at 17:47

## 2022-01-01 RX ADMIN — POTASSIUM CHLORIDE, DEXTROSE MONOHYDRATE AND SODIUM CHLORIDE: 150; 5; 900 INJECTION, SOLUTION INTRAVENOUS at 02:12

## 2022-01-01 RX ADMIN — MICONAZOLE NITRATE: 2 POWDER TOPICAL at 10:19

## 2022-01-01 RX ADMIN — ENOXAPARIN SODIUM 40 MG: 40 INJECTION SUBCUTANEOUS at 09:35

## 2022-01-01 RX ADMIN — OLANZAPINE 2.5 MG: 2.5 TABLET, FILM COATED ORAL at 22:33

## 2022-01-01 RX ADMIN — LORAZEPAM 0.5 MG: 0.5 TABLET ORAL at 20:35

## 2022-01-01 RX ADMIN — PRAMOXINE HYDROCHLORIDE: 10 LOTION TOPICAL at 21:05

## 2022-01-01 RX ADMIN — DEXMEDETOMIDINE HYDROCHLORIDE 10 MCG: 200 INJECTION INTRAVENOUS at 09:27

## 2022-01-01 RX ADMIN — BENZOCAINE 6 MG-MENTHOL 10 MG LOZENGES 1 LOZENGE: at 10:38

## 2022-01-01 RX ADMIN — OXYCODONE HYDROCHLORIDE 5 MG: 5 TABLET ORAL at 19:01

## 2022-01-01 RX ADMIN — MICONAZOLE NITRATE: 2 POWDER TOPICAL at 09:55

## 2022-01-01 RX ADMIN — ASPIRIN 325 MG ORAL TABLET 325 MG: 325 PILL ORAL at 09:26

## 2022-01-01 RX ADMIN — ENOXAPARIN SODIUM 40 MG: 40 INJECTION SUBCUTANEOUS at 08:46

## 2022-01-01 RX ADMIN — ACETAMINOPHEN 975 MG: 325 TABLET ORAL at 05:03

## 2022-01-01 RX ADMIN — FUROSEMIDE 20 MG: 20 TABLET ORAL at 10:32

## 2022-01-01 RX ADMIN — TENECTEPLASE 25 MG: KIT at 13:11

## 2022-01-01 RX ADMIN — ACETAMINOPHEN 650 MG: 325 TABLET ORAL at 18:16

## 2022-01-01 RX ADMIN — ATORVASTATIN CALCIUM 40 MG: 40 TABLET, FILM COATED ORAL at 22:52

## 2022-01-01 RX ADMIN — Medication 1 CAPSULE: at 11:21

## 2022-01-01 RX ADMIN — MICONAZOLE NITRATE: 2 POWDER TOPICAL at 21:16

## 2022-01-01 RX ADMIN — ASPIRIN 325 MG ORAL TABLET 325 MG: 325 PILL ORAL at 11:13

## 2022-01-01 RX ADMIN — Medication 1 CAPSULE: at 20:00

## 2022-01-01 RX ADMIN — ATORVASTATIN CALCIUM 40 MG: 40 TABLET, FILM COATED ORAL at 19:15

## 2022-01-01 RX ADMIN — ENOXAPARIN SODIUM 40 MG: 40 INJECTION SUBCUTANEOUS at 21:34

## 2022-01-01 RX ADMIN — DICLOFENAC SODIUM 2 G: 10 GEL TOPICAL at 18:49

## 2022-01-01 RX ADMIN — OXYCODONE HYDROCHLORIDE 5 MG: 5 TABLET ORAL at 16:11

## 2022-01-01 RX ADMIN — ENOXAPARIN SODIUM 40 MG: 40 INJECTION SUBCUTANEOUS at 18:16

## 2022-01-01 RX ADMIN — MIDAZOLAM 1 MG: 1 INJECTION INTRAMUSCULAR; INTRAVENOUS at 09:41

## 2022-01-01 RX ADMIN — QUETIAPINE 12.5 MG: 25 TABLET, FILM COATED ORAL at 21:53

## 2022-01-01 RX ADMIN — FUROSEMIDE 20 MG: 20 TABLET ORAL at 09:16

## 2022-01-01 RX ADMIN — ONDANSETRON 4 MG: 4 TABLET, ORALLY DISINTEGRATING ORAL at 14:27

## 2022-01-01 RX ADMIN — ASPIRIN 325 MG ORAL TABLET 325 MG: 325 PILL ORAL at 09:10

## 2022-01-01 RX ADMIN — ATORVASTATIN CALCIUM 40 MG: 40 TABLET, FILM COATED ORAL at 21:20

## 2022-01-01 RX ADMIN — MORPHINE SULFATE 10 MG: 100 SOLUTION ORAL at 19:52

## 2022-01-01 RX ADMIN — ARIPIPRAZOLE 5 MG: 5 TABLET ORAL at 21:38

## 2022-01-01 RX ADMIN — OXYCODONE HYDROCHLORIDE 2.5 MG: 5 TABLET ORAL at 20:58

## 2022-01-01 RX ADMIN — SERTRALINE HYDROCHLORIDE 50 MG: 50 TABLET ORAL at 09:20

## 2022-01-01 RX ADMIN — FUROSEMIDE 20 MG: 20 TABLET ORAL at 16:35

## 2022-01-01 RX ADMIN — CLINDAMYCIN HYDROCHLORIDE 300 MG: 300 CAPSULE ORAL at 17:44

## 2022-01-01 RX ADMIN — Medication 1 CAPSULE: at 08:50

## 2022-01-01 RX ADMIN — ATORVASTATIN CALCIUM 40 MG: 40 TABLET, FILM COATED ORAL at 21:47

## 2022-01-01 RX ADMIN — OXYCODONE HYDROCHLORIDE 2.5 MG: 5 TABLET ORAL at 20:57

## 2022-01-01 RX ADMIN — OLANZAPINE 2.5 MG: 2.5 TABLET, FILM COATED ORAL at 22:08

## 2022-01-01 RX ADMIN — FUROSEMIDE 40 MG: 40 TABLET ORAL at 09:21

## 2022-01-01 RX ADMIN — FUROSEMIDE 40 MG: 40 TABLET ORAL at 09:46

## 2022-01-01 RX ADMIN — ACETAMINOPHEN 650 MG: 325 TABLET ORAL at 20:00

## 2022-01-01 RX ADMIN — POLYETHYLENE GLYCOL 3350 17 G: 17 POWDER, FOR SOLUTION ORAL at 08:56

## 2022-01-01 RX ADMIN — POLYETHYLENE GLYCOL 3350 17 G: 17 POWDER, FOR SOLUTION ORAL at 08:34

## 2022-01-01 RX ADMIN — SENNOSIDES AND DOCUSATE SODIUM 2 TABLET: 8.6; 5 TABLET ORAL at 09:17

## 2022-01-01 RX ADMIN — ACETAMINOPHEN 975 MG: 325 TABLET ORAL at 12:23

## 2022-01-01 RX ADMIN — ENOXAPARIN SODIUM 40 MG: 40 INJECTION SUBCUTANEOUS at 16:11

## 2022-01-01 RX ADMIN — THERA TABS 1 TABLET: TAB at 17:24

## 2022-01-01 RX ADMIN — QUETIAPINE 12.5 MG: 25 TABLET, FILM COATED ORAL at 09:00

## 2022-01-01 RX ADMIN — ENOXAPARIN SODIUM 40 MG: 40 INJECTION SUBCUTANEOUS at 18:47

## 2022-01-01 RX ADMIN — MICONAZOLE NITRATE: 2 POWDER TOPICAL at 11:20

## 2022-01-01 RX ADMIN — ENOXAPARIN SODIUM 40 MG: 40 INJECTION SUBCUTANEOUS at 10:17

## 2022-01-01 RX ADMIN — FUROSEMIDE 60 MG: 10 INJECTION, SOLUTION INTRAVENOUS at 20:41

## 2022-01-01 RX ADMIN — POLYETHYLENE GLYCOL 3350 17 G: 17 POWDER, FOR SOLUTION ORAL at 09:20

## 2022-01-01 RX ADMIN — MICONAZOLE NITRATE: 2 POWDER TOPICAL at 20:25

## 2022-01-01 RX ADMIN — SODIUM CHLORIDE, POTASSIUM CHLORIDE, SODIUM LACTATE AND CALCIUM CHLORIDE 500 ML: 600; 310; 30; 20 INJECTION, SOLUTION INTRAVENOUS at 05:14

## 2022-01-01 RX ADMIN — ENOXAPARIN SODIUM 40 MG: 40 INJECTION SUBCUTANEOUS at 21:30

## 2022-01-01 RX ADMIN — SENNOSIDES AND DOCUSATE SODIUM 1 TABLET: 8.6; 5 TABLET ORAL at 21:21

## 2022-01-01 RX ADMIN — SENNOSIDES AND DOCUSATE SODIUM 1 TABLET: 8.6; 5 TABLET ORAL at 20:23

## 2022-01-01 RX ADMIN — FUROSEMIDE 20 MG: 20 TABLET ORAL at 10:16

## 2022-01-01 RX ADMIN — FUROSEMIDE 20 MG: 20 TABLET ORAL at 08:56

## 2022-01-01 RX ADMIN — ACETAMINOPHEN 975 MG: 325 TABLET ORAL at 19:40

## 2022-01-01 RX ADMIN — ASPIRIN 325 MG ORAL TABLET 325 MG: 325 PILL ORAL at 08:10

## 2022-01-01 RX ADMIN — ONDANSETRON 4 MG: 2 INJECTION INTRAMUSCULAR; INTRAVENOUS at 10:43

## 2022-01-01 RX ADMIN — FUROSEMIDE 20 MG: 20 TABLET ORAL at 08:50

## 2022-01-01 RX ADMIN — OXYCODONE HYDROCHLORIDE 5 MG: 5 TABLET ORAL at 01:49

## 2022-01-01 RX ADMIN — PRAMOXINE HYDROCHLORIDE: 10 LOTION TOPICAL at 22:54

## 2022-01-01 RX ADMIN — Medication 1 CAPSULE: at 07:52

## 2022-01-01 RX ADMIN — SENNOSIDES AND DOCUSATE SODIUM 1 TABLET: 8.6; 5 TABLET ORAL at 22:15

## 2022-01-01 RX ADMIN — LISINOPRIL 10 MG: 10 TABLET ORAL at 09:24

## 2022-01-01 RX ADMIN — ATORVASTATIN CALCIUM 40 MG: 40 TABLET, FILM COATED ORAL at 21:59

## 2022-01-01 RX ADMIN — ONDANSETRON 4 MG: 2 INJECTION INTRAMUSCULAR; INTRAVENOUS at 08:48

## 2022-01-01 RX ADMIN — SENNOSIDES AND DOCUSATE SODIUM 1 TABLET: 8.6; 5 TABLET ORAL at 20:48

## 2022-01-01 RX ADMIN — DICLOFENAC SODIUM 2 G: 10 GEL TOPICAL at 07:54

## 2022-01-01 RX ADMIN — HYDROXYZINE HYDROCHLORIDE 25 MG: 25 TABLET, FILM COATED ORAL at 07:35

## 2022-01-01 RX ADMIN — QUETIAPINE 12.5 MG: 25 TABLET, FILM COATED ORAL at 04:40

## 2022-01-01 RX ADMIN — MULTIPLE VITAMINS W/ MINERALS TAB 1 TABLET: TAB at 08:10

## 2022-01-01 RX ADMIN — LISINOPRIL 10 MG: 10 TABLET ORAL at 09:46

## 2022-01-01 RX ADMIN — MICONAZOLE NITRATE: 2 POWDER TOPICAL at 10:26

## 2022-01-01 RX ADMIN — DICLOFENAC SODIUM 2 G: 10 GEL TOPICAL at 13:22

## 2022-01-01 RX ADMIN — LEVOFLOXACIN 750 MG: 750 TABLET, FILM COATED ORAL at 08:47

## 2022-01-01 RX ADMIN — OXYCODONE HYDROCHLORIDE 2.5 MG: 5 TABLET ORAL at 13:42

## 2022-01-01 RX ADMIN — DICLOFENAC SODIUM 2 G: 10 GEL TOPICAL at 14:46

## 2022-01-01 RX ADMIN — OXYCODONE HYDROCHLORIDE 5 MG: 5 TABLET ORAL at 04:20

## 2022-01-01 RX ADMIN — OXYCODONE HYDROCHLORIDE 5 MG: 5 TABLET ORAL at 14:20

## 2022-01-01 RX ADMIN — ENOXAPARIN SODIUM 40 MG: 40 INJECTION SUBCUTANEOUS at 10:06

## 2022-01-01 RX ADMIN — Medication 1 CAPSULE: at 09:58

## 2022-01-01 RX ADMIN — PROPOFOL 20 MCG/KG/MIN: 10 INJECTION, EMULSION INTRAVENOUS at 07:29

## 2022-01-01 RX ADMIN — OXYCODONE HYDROCHLORIDE 5 MG: 5 TABLET ORAL at 08:15

## 2022-01-01 RX ADMIN — BNT162B2 30 MCG: 0.23 INJECTION, SUSPENSION INTRAMUSCULAR at 14:45

## 2022-01-01 RX ADMIN — ACETAMINOPHEN 650 MG: 325 TABLET ORAL at 15:50

## 2022-01-01 RX ADMIN — FUROSEMIDE 20 MG: 20 TABLET ORAL at 09:33

## 2022-01-01 RX ADMIN — QUETIAPINE 12.5 MG: 25 TABLET, FILM COATED ORAL at 18:47

## 2022-01-01 RX ADMIN — OXYCODONE HYDROCHLORIDE 2.5 MG: 5 TABLET ORAL at 15:32

## 2022-01-01 RX ADMIN — Medication 1 CAPSULE: at 21:44

## 2022-01-01 RX ADMIN — POLYETHYLENE GLYCOL 3350 17 G: 17 POWDER, FOR SOLUTION ORAL at 10:19

## 2022-01-01 RX ADMIN — POTASSIUM CHLORIDE 20 MEQ: 1500 TABLET, EXTENDED RELEASE ORAL at 16:19

## 2022-01-01 RX ADMIN — FUROSEMIDE 40 MG: 40 TABLET ORAL at 08:02

## 2022-01-01 RX ADMIN — SENNOSIDES AND DOCUSATE SODIUM 1 TABLET: 8.6; 5 TABLET ORAL at 10:27

## 2022-01-01 RX ADMIN — POTASSIUM & SODIUM PHOSPHATES POWDER PACK 280-160-250 MG 1 PACKET: 280-160-250 PACK at 16:55

## 2022-01-01 RX ADMIN — HYDROMORPHONE HYDROCHLORIDE 0.1 MG: 0.2 INJECTION, SOLUTION INTRAMUSCULAR; INTRAVENOUS; SUBCUTANEOUS at 18:50

## 2022-01-01 RX ADMIN — MICONAZOLE NITRATE: 2 POWDER TOPICAL at 09:35

## 2022-01-01 RX ADMIN — DICLOFENAC SODIUM 2 G: 10 GEL TOPICAL at 18:29

## 2022-01-01 RX ADMIN — MICONAZOLE NITRATE: 2 POWDER TOPICAL at 08:00

## 2022-01-01 RX ADMIN — POTASSIUM CHLORIDE 20 MEQ: 1500 TABLET, EXTENDED RELEASE ORAL at 09:19

## 2022-01-01 RX ADMIN — OXYCODONE HYDROCHLORIDE 5 MG: 5 TABLET ORAL at 18:49

## 2022-01-01 RX ADMIN — ENOXAPARIN SODIUM 40 MG: 40 INJECTION SUBCUTANEOUS at 06:36

## 2022-01-01 RX ADMIN — POTASSIUM CHLORIDE 20 MEQ: 1.5 POWDER, FOR SOLUTION ORAL at 09:51

## 2022-01-01 RX ADMIN — ASPIRIN 325 MG ORAL TABLET 325 MG: 325 PILL ORAL at 08:02

## 2022-01-01 RX ADMIN — ATORVASTATIN CALCIUM 40 MG: 40 TABLET, FILM COATED ORAL at 20:21

## 2022-01-01 RX ADMIN — HYDROXYZINE HYDROCHLORIDE 25 MG: 25 TABLET, FILM COATED ORAL at 17:08

## 2022-01-01 RX ADMIN — QUETIAPINE 12.5 MG: 25 TABLET, FILM COATED ORAL at 19:44

## 2022-01-01 RX ADMIN — FUROSEMIDE 20 MG: 20 TABLET ORAL at 09:19

## 2022-01-01 RX ADMIN — SENNOSIDES AND DOCUSATE SODIUM 1 TABLET: 8.6; 5 TABLET ORAL at 08:55

## 2022-01-01 RX ADMIN — OXYCODONE HYDROCHLORIDE 5 MG: 5 TABLET ORAL at 12:49

## 2022-01-01 RX ADMIN — OLANZAPINE 5 MG: 5 TABLET, ORALLY DISINTEGRATING ORAL at 18:25

## 2022-01-01 RX ADMIN — HUMAN ALBUMIN MICROSPHERES AND PERFLUTREN 9 ML: 10; .22 INJECTION, SOLUTION INTRAVENOUS at 09:45

## 2022-01-01 RX ADMIN — OLANZAPINE 2.5 MG: 2.5 TABLET, FILM COATED ORAL at 22:15

## 2022-01-01 RX ADMIN — POTASSIUM CHLORIDE 20 MEQ: 1500 TABLET, EXTENDED RELEASE ORAL at 09:21

## 2022-01-01 RX ADMIN — FUROSEMIDE 20 MG: 20 TABLET ORAL at 08:15

## 2022-01-01 RX ADMIN — LEVOFLOXACIN 750 MG: 750 TABLET, FILM COATED ORAL at 17:44

## 2022-01-01 RX ADMIN — DEXTROSE AND SODIUM CHLORIDE: 5; 450 INJECTION, SOLUTION INTRAVENOUS at 17:19

## 2022-01-01 RX ADMIN — MICONAZOLE NITRATE: 2 POWDER TOPICAL at 09:10

## 2022-01-01 RX ADMIN — QUETIAPINE 12.5 MG: 25 TABLET, FILM COATED ORAL at 16:41

## 2022-01-01 RX ADMIN — LISINOPRIL 10 MG: 10 TABLET ORAL at 09:42

## 2022-01-01 RX ADMIN — CLINDAMYCIN PHOSPHATE 900 MG: 900 INJECTION, SOLUTION INTRAVENOUS at 12:32

## 2022-01-01 RX ADMIN — SODIUM CHLORIDE 1000 ML: 9 INJECTION, SOLUTION INTRAVENOUS at 14:06

## 2022-01-01 RX ADMIN — ATORVASTATIN CALCIUM 40 MG: 40 TABLET, FILM COATED ORAL at 20:19

## 2022-01-01 RX ADMIN — HYDROCORTISONE SODIUM SUCCINATE 100 MG: 100 INJECTION, POWDER, FOR SOLUTION INTRAMUSCULAR; INTRAVENOUS at 20:20

## 2022-01-01 RX ADMIN — MICONAZOLE NITRATE: 2 POWDER TOPICAL at 22:07

## 2022-01-01 RX ADMIN — ENOXAPARIN SODIUM 40 MG: 40 INJECTION SUBCUTANEOUS at 08:15

## 2022-01-01 RX ADMIN — LISINOPRIL 10 MG: 10 TABLET ORAL at 08:44

## 2022-01-01 RX ADMIN — QUETIAPINE FUMARATE 25 MG: 25 TABLET ORAL at 21:46

## 2022-01-01 RX ADMIN — OXYCODONE HYDROCHLORIDE 5 MG: 5 TABLET ORAL at 21:05

## 2022-01-01 RX ADMIN — FUROSEMIDE 40 MG: 40 TABLET ORAL at 17:45

## 2022-01-01 RX ADMIN — OXYCODONE HYDROCHLORIDE 5 MG: 5 TABLET ORAL at 13:02

## 2022-01-01 RX ADMIN — Medication 1 CAPSULE: at 21:31

## 2022-01-01 RX ADMIN — MORPHINE SULFATE 10 MG: 100 SOLUTION ORAL at 13:52

## 2022-01-01 RX ADMIN — Medication 12.5 MG: at 10:16

## 2022-01-01 RX ADMIN — Medication 1 CAPSULE: at 21:04

## 2022-01-01 RX ADMIN — FUROSEMIDE 40 MG: 40 TABLET ORAL at 09:52

## 2022-01-01 RX ADMIN — OLANZAPINE 5 MG: 5 TABLET, ORALLY DISINTEGRATING ORAL at 21:28

## 2022-01-01 RX ADMIN — SENNOSIDES AND DOCUSATE SODIUM 1 TABLET: 8.6; 5 TABLET ORAL at 08:02

## 2022-01-01 RX ADMIN — OXYCODONE HYDROCHLORIDE 5 MG: 5 TABLET ORAL at 15:01

## 2022-01-01 RX ADMIN — DICLOFENAC SODIUM 2 G: 10 GEL TOPICAL at 08:21

## 2022-01-01 RX ADMIN — MICONAZOLE NITRATE: 2 POWDER TOPICAL at 10:39

## 2022-01-01 RX ADMIN — FUROSEMIDE 20 MG: 20 TABLET ORAL at 18:23

## 2022-01-01 RX ADMIN — ENOXAPARIN SODIUM 40 MG: 40 INJECTION SUBCUTANEOUS at 09:43

## 2022-01-01 RX ADMIN — SENNOSIDES AND DOCUSATE SODIUM 1 TABLET: 8.6; 5 TABLET ORAL at 08:56

## 2022-01-01 RX ADMIN — SENNOSIDES AND DOCUSATE SODIUM 1 TABLET: 8.6; 5 TABLET ORAL at 10:24

## 2022-01-01 RX ADMIN — POTASSIUM CHLORIDE 20 MEQ: 20 SOLUTION ORAL at 17:58

## 2022-01-01 RX ADMIN — SENNOSIDES AND DOCUSATE SODIUM 2 TABLET: 8.6; 5 TABLET ORAL at 08:43

## 2022-01-01 RX ADMIN — POTASSIUM CHLORIDE 20 MEQ: 1500 TABLET, EXTENDED RELEASE ORAL at 09:31

## 2022-01-01 RX ADMIN — PRAMOXINE HYDROCHLORIDE: 10 LOTION TOPICAL at 21:41

## 2022-01-01 RX ADMIN — MULTIPLE VITAMINS W/ MINERALS TAB 1 TABLET: TAB at 09:51

## 2022-01-01 RX ADMIN — LORAZEPAM 0.5 MG: 0.5 TABLET ORAL at 02:21

## 2022-01-01 RX ADMIN — MULTIPLE VITAMINS W/ MINERALS TAB 1 TABLET: TAB at 09:04

## 2022-01-01 RX ADMIN — ASPIRIN 325 MG ORAL TABLET 325 MG: 325 PILL ORAL at 09:20

## 2022-01-01 RX ADMIN — OXYCODONE HYDROCHLORIDE 5 MG: 5 TABLET ORAL at 21:15

## 2022-01-01 RX ADMIN — HYDROXYZINE HYDROCHLORIDE 50 MG: 25 TABLET ORAL at 09:23

## 2022-01-01 RX ADMIN — ARIPIPRAZOLE 10 MG: 5 TABLET ORAL at 21:21

## 2022-01-01 RX ADMIN — ATORVASTATIN CALCIUM 40 MG: 40 TABLET, FILM COATED ORAL at 21:02

## 2022-01-01 RX ADMIN — POTASSIUM CHLORIDE 20 MEQ: 1.5 POWDER, FOR SOLUTION ORAL at 08:31

## 2022-01-01 RX ADMIN — LEVOFLOXACIN 750 MG: 750 TABLET, FILM COATED ORAL at 08:17

## 2022-01-01 RX ADMIN — ATORVASTATIN CALCIUM 40 MG: 40 TABLET, FILM COATED ORAL at 20:49

## 2022-01-01 RX ADMIN — POTASSIUM CHLORIDE 40 MEQ: 20 SOLUTION ORAL at 09:23

## 2022-01-01 RX ADMIN — LISINOPRIL 10 MG: 10 TABLET ORAL at 09:53

## 2022-01-01 RX ADMIN — ATORVASTATIN CALCIUM 40 MG: 40 TABLET, FILM COATED ORAL at 19:52

## 2022-01-01 RX ADMIN — SENNOSIDES AND DOCUSATE SODIUM 1 TABLET: 8.6; 5 TABLET ORAL at 19:54

## 2022-01-01 RX ADMIN — FUROSEMIDE 40 MG: 40 TABLET ORAL at 10:27

## 2022-01-01 RX ADMIN — ACETAMINOPHEN 975 MG: 325 TABLET ORAL at 19:51

## 2022-01-01 RX ADMIN — DICLOFENAC SODIUM 2 G: 10 GEL TOPICAL at 09:48

## 2022-01-01 RX ADMIN — LEVOFLOXACIN 750 MG: 750 TABLET, FILM COATED ORAL at 09:24

## 2022-01-01 RX ADMIN — HYDROXYZINE HYDROCHLORIDE 50 MG: 25 TABLET ORAL at 08:54

## 2022-01-01 RX ADMIN — ENOXAPARIN SODIUM 40 MG: 40 INJECTION SUBCUTANEOUS at 10:27

## 2022-01-01 RX ADMIN — ARIPIPRAZOLE 5 MG: 5 TABLET ORAL at 20:23

## 2022-01-01 RX ADMIN — POTASSIUM & SODIUM PHOSPHATES POWDER PACK 280-160-250 MG 1 PACKET: 280-160-250 PACK at 09:56

## 2022-01-01 RX ADMIN — HYDROMORPHONE HYDROCHLORIDE 0.2 MG: 0.2 INJECTION, SOLUTION INTRAMUSCULAR; INTRAVENOUS; SUBCUTANEOUS at 05:46

## 2022-01-01 RX ADMIN — PRAMOXINE HYDROCHLORIDE: 10 LOTION TOPICAL at 17:56

## 2022-01-01 RX ADMIN — LORAZEPAM 0.5 MG: 0.5 TABLET ORAL at 23:16

## 2022-01-01 RX ADMIN — ENOXAPARIN SODIUM 40 MG: 40 INJECTION SUBCUTANEOUS at 20:07

## 2022-01-01 RX ADMIN — OXYCODONE HYDROCHLORIDE 2.5 MG: 5 TABLET ORAL at 08:46

## 2022-01-01 RX ADMIN — ACETAMINOPHEN 975 MG: 325 TABLET ORAL at 01:04

## 2022-01-01 RX ADMIN — SERTRALINE HYDROCHLORIDE 50 MG: 50 TABLET ORAL at 09:43

## 2022-01-01 RX ADMIN — MULTIPLE VITAMINS W/ MINERALS TAB 1 TABLET: TAB at 09:26

## 2022-01-01 RX ADMIN — HYDROMORPHONE HYDROCHLORIDE 0.1 MG: 0.2 INJECTION, SOLUTION INTRAMUSCULAR; INTRAVENOUS; SUBCUTANEOUS at 22:18

## 2022-01-01 RX ADMIN — QUETIAPINE 12.5 MG: 25 TABLET, FILM COATED ORAL at 10:55

## 2022-01-01 RX ADMIN — ARIPIPRAZOLE 10 MG: 5 TABLET ORAL at 21:31

## 2022-01-01 RX ADMIN — SODIUM CHLORIDE 80 ML: 9 INJECTION, SOLUTION INTRAVENOUS at 12:12

## 2022-01-01 RX ADMIN — AMOXICILLIN AND CLAVULANATE POTASSIUM 1 TABLET: 875; 125 TABLET, FILM COATED ORAL at 19:40

## 2022-01-01 ASSESSMENT — ACTIVITIES OF DAILY LIVING (ADL)
ADLS_ACUITY_SCORE: 43
ADLS_ACUITY_SCORE: 41
ADLS_ACUITY_SCORE: 43
ADLS_ACUITY_SCORE: 35
ADLS_ACUITY_SCORE: 39
ADLS_ACUITY_SCORE: 39
ADLS_ACUITY_SCORE: 43
ADLS_ACUITY_SCORE: 37
ADLS_ACUITY_SCORE: 37
ADLS_ACUITY_SCORE: 43
ADLS_ACUITY_SCORE: 38
ADLS_ACUITY_SCORE: 42
ADLS_ACUITY_SCORE: 39
ADLS_ACUITY_SCORE: 37
ADLS_ACUITY_SCORE: 41
ADLS_ACUITY_SCORE: 39
ADLS_ACUITY_SCORE: 39
ADLS_ACUITY_SCORE: 38
ADLS_ACUITY_SCORE: 39
ADLS_ACUITY_SCORE: 41
ADLS_ACUITY_SCORE: 49
ADLS_ACUITY_SCORE: 45
ADLS_ACUITY_SCORE: 40
ADLS_ACUITY_SCORE: 41
ADLS_ACUITY_SCORE: 43
ADLS_ACUITY_SCORE: 34
ADLS_ACUITY_SCORE: 40
ADLS_ACUITY_SCORE: 38
ADLS_ACUITY_SCORE: 39
ADLS_ACUITY_SCORE: 44
ADLS_ACUITY_SCORE: 43
ADLS_ACUITY_SCORE: 43
ADLS_ACUITY_SCORE: 42
ADLS_ACUITY_SCORE: 39
ADLS_ACUITY_SCORE: 43
ADLS_ACUITY_SCORE: 41
ADLS_ACUITY_SCORE: 41
ADLS_ACUITY_SCORE: 43
ADLS_ACUITY_SCORE: 39
ADLS_ACUITY_SCORE: 43
ADLS_ACUITY_SCORE: 43
ADLS_ACUITY_SCORE: 42
ADLS_ACUITY_SCORE: 41
ADLS_ACUITY_SCORE: 37
ADLS_ACUITY_SCORE: 39
ADLS_ACUITY_SCORE: 40
ADLS_ACUITY_SCORE: 35
ADLS_ACUITY_SCORE: 41
ADLS_ACUITY_SCORE: 40
ADLS_ACUITY_SCORE: 41
ADLS_ACUITY_SCORE: 39
ADLS_ACUITY_SCORE: 45
ADLS_ACUITY_SCORE: 43
ADLS_ACUITY_SCORE: 43
ADLS_ACUITY_SCORE: 39
ADLS_ACUITY_SCORE: 45
ADLS_ACUITY_SCORE: 39
ADLS_ACUITY_SCORE: 38
ADLS_ACUITY_SCORE: 45
ADLS_ACUITY_SCORE: 43
ADLS_ACUITY_SCORE: 41
ADLS_ACUITY_SCORE: 39
ADLS_ACUITY_SCORE: 41
ADLS_ACUITY_SCORE: 45
ADLS_ACUITY_SCORE: 43
ADLS_ACUITY_SCORE: 43
ADLS_ACUITY_SCORE: 37
ADLS_ACUITY_SCORE: 39
ADLS_ACUITY_SCORE: 37
ADLS_ACUITY_SCORE: 47
ADLS_ACUITY_SCORE: 39
ADLS_ACUITY_SCORE: 39
ADLS_ACUITY_SCORE: 43
ADLS_ACUITY_SCORE: 37
ADLS_ACUITY_SCORE: 43
ADLS_ACUITY_SCORE: 43
ADLS_ACUITY_SCORE: 45
ADLS_ACUITY_SCORE: 37
ADLS_ACUITY_SCORE: 39
ADLS_ACUITY_SCORE: 36
ADLS_ACUITY_SCORE: 45
ADLS_ACUITY_SCORE: 39
ADLS_ACUITY_SCORE: 41
ADLS_ACUITY_SCORE: 43
ADLS_ACUITY_SCORE: 37
ADLS_ACUITY_SCORE: 45
ADLS_ACUITY_SCORE: 45
ADLS_ACUITY_SCORE: 43
ADLS_ACUITY_SCORE: 41
ADLS_ACUITY_SCORE: 41
ADLS_ACUITY_SCORE: 39
ADLS_ACUITY_SCORE: 41
ADLS_ACUITY_SCORE: 38
ADLS_ACUITY_SCORE: 35
ADLS_ACUITY_SCORE: 43
ADLS_ACUITY_SCORE: 34
ADLS_ACUITY_SCORE: 43
ADLS_ACUITY_SCORE: 41
ADLS_ACUITY_SCORE: 39
ADLS_ACUITY_SCORE: 43
ADLS_ACUITY_SCORE: 44
ADLS_ACUITY_SCORE: 39
ADLS_ACUITY_SCORE: 41
ADLS_ACUITY_SCORE: 43
ADLS_ACUITY_SCORE: 46
ADLS_ACUITY_SCORE: 43
ADLS_ACUITY_SCORE: 47
ADLS_ACUITY_SCORE: 42
ADLS_ACUITY_SCORE: 39
ADLS_ACUITY_SCORE: 35
ADLS_ACUITY_SCORE: 41
ADLS_ACUITY_SCORE: 41
ADLS_ACUITY_SCORE: 37
ADLS_ACUITY_SCORE: 34
ADLS_ACUITY_SCORE: 43
ADLS_ACUITY_SCORE: 43
ADLS_ACUITY_SCORE: 39
ADLS_ACUITY_SCORE: 45
ADLS_ACUITY_SCORE: 45
ADLS_ACUITY_SCORE: 43
ADLS_ACUITY_SCORE: 45
ADLS_ACUITY_SCORE: 36
ADLS_ACUITY_SCORE: 38
ADLS_ACUITY_SCORE: 37
ADLS_ACUITY_SCORE: 43
ADLS_ACUITY_SCORE: 43
ADLS_ACUITY_SCORE: 41
ADLS_ACUITY_SCORE: 41
ADLS_ACUITY_SCORE: 39
ADLS_ACUITY_SCORE: 46
ADLS_ACUITY_SCORE: 43
ADLS_ACUITY_SCORE: 39
ADLS_ACUITY_SCORE: 42
ADLS_ACUITY_SCORE: 40
ADLS_ACUITY_SCORE: 45
ADLS_ACUITY_SCORE: 39
ADLS_ACUITY_SCORE: 34
ADLS_ACUITY_SCORE: 39
ADLS_ACUITY_SCORE: 43
ADLS_ACUITY_SCORE: 34
ADLS_ACUITY_SCORE: 40
ADLS_ACUITY_SCORE: 43
ADLS_ACUITY_SCORE: 37
ADLS_ACUITY_SCORE: 41
ADLS_ACUITY_SCORE: 34
ADLS_ACUITY_SCORE: 43
ADLS_ACUITY_SCORE: 41
ADLS_ACUITY_SCORE: 43
ADLS_ACUITY_SCORE: 39
ADLS_ACUITY_SCORE: 45
ADLS_ACUITY_SCORE: 38
ADLS_ACUITY_SCORE: 37
ADLS_ACUITY_SCORE: 47
ADLS_ACUITY_SCORE: 43
ADLS_ACUITY_SCORE: 39
ADLS_ACUITY_SCORE: 43
ADLS_ACUITY_SCORE: 40
ADLS_ACUITY_SCORE: 39
ADLS_ACUITY_SCORE: 40
ADLS_ACUITY_SCORE: 40
ADLS_ACUITY_SCORE: 41
ADLS_ACUITY_SCORE: 41
ADLS_ACUITY_SCORE: 37
ADLS_ACUITY_SCORE: 40
ADLS_ACUITY_SCORE: 39
ADLS_ACUITY_SCORE: 42
ADLS_ACUITY_SCORE: 37
ADLS_ACUITY_SCORE: 39
ADLS_ACUITY_SCORE: 41
ADLS_ACUITY_SCORE: 43
ADLS_ACUITY_SCORE: 41
ADLS_ACUITY_SCORE: 43
ADLS_ACUITY_SCORE: 39
ADLS_ACUITY_SCORE: 40
ADLS_ACUITY_SCORE: 39
ADLS_ACUITY_SCORE: 43
ADLS_ACUITY_SCORE: 45
ADLS_ACUITY_SCORE: 39
ADLS_ACUITY_SCORE: 40
ADLS_ACUITY_SCORE: 38
ADLS_ACUITY_SCORE: 37
ADLS_ACUITY_SCORE: 43
ADLS_ACUITY_SCORE: 34
ADLS_ACUITY_SCORE: 41
ADLS_ACUITY_SCORE: 41
ADLS_ACUITY_SCORE: 45
ADLS_ACUITY_SCORE: 39
ADLS_ACUITY_SCORE: 40
ADLS_ACUITY_SCORE: 39
ADLS_ACUITY_SCORE: 43
ADLS_ACUITY_SCORE: 44
ADLS_ACUITY_SCORE: 41
ADLS_ACUITY_SCORE: 43
ADLS_ACUITY_SCORE: 45
ADLS_ACUITY_SCORE: 45
ADLS_ACUITY_SCORE: 42
ADLS_ACUITY_SCORE: 44
ADLS_ACUITY_SCORE: 41
ADLS_ACUITY_SCORE: 38
ADLS_ACUITY_SCORE: 41
ADLS_ACUITY_SCORE: 43
ADLS_ACUITY_SCORE: 39
ADLS_ACUITY_SCORE: 45
ADLS_ACUITY_SCORE: 41
ADLS_ACUITY_SCORE: 43
ADLS_ACUITY_SCORE: 40
ADLS_ACUITY_SCORE: 49
ADLS_ACUITY_SCORE: 39
ADLS_ACUITY_SCORE: 34
ADLS_ACUITY_SCORE: 38
ADLS_ACUITY_SCORE: 38
ADLS_ACUITY_SCORE: 43
ADLS_ACUITY_SCORE: 45
ADLS_ACUITY_SCORE: 43
ADLS_ACUITY_SCORE: 40
ADLS_ACUITY_SCORE: 41
ADLS_ACUITY_SCORE: 44
ADLS_ACUITY_SCORE: 39
ADLS_ACUITY_SCORE: 43
ADLS_ACUITY_SCORE: 40
ADLS_ACUITY_SCORE: 42
ADLS_ACUITY_SCORE: 39
ADLS_ACUITY_SCORE: 43
ADLS_ACUITY_SCORE: 40
ADLS_ACUITY_SCORE: 45
ADLS_ACUITY_SCORE: 40
ADLS_ACUITY_SCORE: 45
ADLS_ACUITY_SCORE: 39
ADLS_ACUITY_SCORE: 40
ADLS_ACUITY_SCORE: 39
ADLS_ACUITY_SCORE: 45
ADLS_ACUITY_SCORE: 40
ADLS_ACUITY_SCORE: 40
ADLS_ACUITY_SCORE: 45
ADLS_ACUITY_SCORE: 39
ADLS_ACUITY_SCORE: 38
ADLS_ACUITY_SCORE: 37
ADLS_ACUITY_SCORE: 38
ADLS_ACUITY_SCORE: 47
ADLS_ACUITY_SCORE: 42
ADLS_ACUITY_SCORE: 46
ADLS_ACUITY_SCORE: 34
ADLS_ACUITY_SCORE: 43
ADLS_ACUITY_SCORE: 50
ADLS_ACUITY_SCORE: 49
ADLS_ACUITY_SCORE: 43
ADLS_ACUITY_SCORE: 44
ADLS_ACUITY_SCORE: 37
ADLS_ACUITY_SCORE: 47
ADLS_ACUITY_SCORE: 42
ADLS_ACUITY_SCORE: 43
ADLS_ACUITY_SCORE: 43
ADLS_ACUITY_SCORE: 39
ADLS_ACUITY_SCORE: 41
ADLS_ACUITY_SCORE: 43
ADLS_ACUITY_SCORE: 37
ADLS_ACUITY_SCORE: 41
ADLS_ACUITY_SCORE: 37
ADLS_ACUITY_SCORE: 35
ADLS_ACUITY_SCORE: 39
ADLS_ACUITY_SCORE: 37
ADLS_ACUITY_SCORE: 40
ADLS_ACUITY_SCORE: 43
ADLS_ACUITY_SCORE: 43
ADLS_ACUITY_SCORE: 41
ADLS_ACUITY_SCORE: 45
ADLS_ACUITY_SCORE: 37
ADLS_ACUITY_SCORE: 37
ADLS_ACUITY_SCORE: 39
ADLS_ACUITY_SCORE: 41
ADLS_ACUITY_SCORE: 41
ADLS_ACUITY_SCORE: 43
ADLS_ACUITY_SCORE: 42
ADLS_ACUITY_SCORE: 43
ADLS_ACUITY_SCORE: 45
ADLS_ACUITY_SCORE: 41
ADLS_ACUITY_SCORE: 38
ADLS_ACUITY_SCORE: 49
ADLS_ACUITY_SCORE: 39
ADLS_ACUITY_SCORE: 43
ADLS_ACUITY_SCORE: 42
ADLS_ACUITY_SCORE: 40
ADLS_ACUITY_SCORE: 45
ADLS_ACUITY_SCORE: 43
ADLS_ACUITY_SCORE: 42
ADLS_ACUITY_SCORE: 45
ADLS_ACUITY_SCORE: 40
ADLS_ACUITY_SCORE: 45
ADLS_ACUITY_SCORE: 38
ADLS_ACUITY_SCORE: 41
ADLS_ACUITY_SCORE: 41
ADLS_ACUITY_SCORE: 37
ADLS_ACUITY_SCORE: 34
ADLS_ACUITY_SCORE: 44
ADLS_ACUITY_SCORE: 39
ADLS_ACUITY_SCORE: 41
ADLS_ACUITY_SCORE: 43
ADLS_ACUITY_SCORE: 41
ADLS_ACUITY_SCORE: 35
ADLS_ACUITY_SCORE: 47
ADLS_ACUITY_SCORE: 35
ADLS_ACUITY_SCORE: 35
ADLS_ACUITY_SCORE: 43
ADLS_ACUITY_SCORE: 39
ADLS_ACUITY_SCORE: 39
ADLS_ACUITY_SCORE: 35
ADLS_ACUITY_SCORE: 44
ADLS_ACUITY_SCORE: 45
ADLS_ACUITY_SCORE: 41
ADLS_ACUITY_SCORE: 37
ADLS_ACUITY_SCORE: 43
ADLS_ACUITY_SCORE: 41
ADLS_ACUITY_SCORE: 38
ADLS_ACUITY_SCORE: 39
ADLS_ACUITY_SCORE: 39
ADLS_ACUITY_SCORE: 40
ADLS_ACUITY_SCORE: 41
ADLS_ACUITY_SCORE: 41
ADLS_ACUITY_SCORE: 44
ADLS_ACUITY_SCORE: 41
ADLS_ACUITY_SCORE: 43
ADLS_ACUITY_SCORE: 37
ADLS_ACUITY_SCORE: 39
ADLS_ACUITY_SCORE: 44
ADLS_ACUITY_SCORE: 41
ADLS_ACUITY_SCORE: 46
ADLS_ACUITY_SCORE: 50
ADLS_ACUITY_SCORE: 43
ADLS_ACUITY_SCORE: 44
ADLS_ACUITY_SCORE: 43
ADLS_ACUITY_SCORE: 37
ADLS_ACUITY_SCORE: 40
ADLS_ACUITY_SCORE: 39
ADLS_ACUITY_SCORE: 38
ADLS_ACUITY_SCORE: 34
ADLS_ACUITY_SCORE: 35
ADLS_ACUITY_SCORE: 33
ADLS_ACUITY_SCORE: 41
ADLS_ACUITY_SCORE: 35
ADLS_ACUITY_SCORE: 43
ADLS_ACUITY_SCORE: 39
ADLS_ACUITY_SCORE: 45
ADLS_ACUITY_SCORE: 41
ADLS_ACUITY_SCORE: 43
ADLS_ACUITY_SCORE: 44
ADLS_ACUITY_SCORE: 43
ADLS_ACUITY_SCORE: 43
ADLS_ACUITY_SCORE: 39
ADLS_ACUITY_SCORE: 43
ADLS_ACUITY_SCORE: 43
ADLS_ACUITY_SCORE: 42
ADLS_ACUITY_SCORE: 43
ADLS_ACUITY_SCORE: 37
ADLS_ACUITY_SCORE: 43
ADLS_ACUITY_SCORE: 43
ADLS_ACUITY_SCORE: 34
ADLS_ACUITY_SCORE: 35
ADLS_ACUITY_SCORE: 43
ADLS_ACUITY_SCORE: 37
ADLS_ACUITY_SCORE: 43
ADLS_ACUITY_SCORE: 41
ADLS_ACUITY_SCORE: 45
ADLS_ACUITY_SCORE: 35
ADLS_ACUITY_SCORE: 34
ADLS_ACUITY_SCORE: 39
ADLS_ACUITY_SCORE: 40
ADLS_ACUITY_SCORE: 45
ADLS_ACUITY_SCORE: 43
ADLS_ACUITY_SCORE: 38
ADLS_ACUITY_SCORE: 39
ADLS_ACUITY_SCORE: 43
ADLS_ACUITY_SCORE: 45
ADLS_ACUITY_SCORE: 38
ADLS_ACUITY_SCORE: 43
ADLS_ACUITY_SCORE: 39
ADLS_ACUITY_SCORE: 41
ADLS_ACUITY_SCORE: 43
ADLS_ACUITY_SCORE: 37
ADLS_ACUITY_SCORE: 43
ADLS_ACUITY_SCORE: 39
ADLS_ACUITY_SCORE: 37
ADLS_ACUITY_SCORE: 42
ADLS_ACUITY_SCORE: 41
ADLS_ACUITY_SCORE: 41
ADLS_ACUITY_SCORE: 37
ADLS_ACUITY_SCORE: 39
ADLS_ACUITY_SCORE: 37
ADLS_ACUITY_SCORE: 39
ADLS_ACUITY_SCORE: 45
ADLS_ACUITY_SCORE: 43
ADLS_ACUITY_SCORE: 43
ADLS_ACUITY_SCORE: 35
ADLS_ACUITY_SCORE: 41
ADLS_ACUITY_SCORE: 39
ADLS_ACUITY_SCORE: 40
ADLS_ACUITY_SCORE: 39
ADLS_ACUITY_SCORE: 38
ADLS_ACUITY_SCORE: 37
ADLS_ACUITY_SCORE: 41
ADLS_ACUITY_SCORE: 33
ADLS_ACUITY_SCORE: 43
ADLS_ACUITY_SCORE: 38
ADLS_ACUITY_SCORE: 41
ADLS_ACUITY_SCORE: 45
ADLS_ACUITY_SCORE: 43
ADLS_ACUITY_SCORE: 40
ADLS_ACUITY_SCORE: 45
ADLS_ACUITY_SCORE: 43
ADLS_ACUITY_SCORE: 45
ADLS_ACUITY_SCORE: 37
ADLS_ACUITY_SCORE: 34
ADLS_ACUITY_SCORE: 45
ADLS_ACUITY_SCORE: 41
ADLS_ACUITY_SCORE: 38
ADLS_ACUITY_SCORE: 38
ADLS_ACUITY_SCORE: 39
ADLS_ACUITY_SCORE: 40
ADLS_ACUITY_SCORE: 45
ADLS_ACUITY_SCORE: 39
ADLS_ACUITY_SCORE: 46
ADLS_ACUITY_SCORE: 43
ADLS_ACUITY_SCORE: 45
ADLS_ACUITY_SCORE: 39
ADLS_ACUITY_SCORE: 37
ADLS_ACUITY_SCORE: 39
ADLS_ACUITY_SCORE: 43
ADLS_ACUITY_SCORE: 43
ADLS_ACUITY_SCORE: 35
ADLS_ACUITY_SCORE: 39
ADLS_ACUITY_SCORE: 43
ADLS_ACUITY_SCORE: 43
ADLS_ACUITY_SCORE: 46
ADLS_ACUITY_SCORE: 43
ADLS_ACUITY_SCORE: 37
ADLS_ACUITY_SCORE: 37
ADLS_ACUITY_SCORE: 43
ADLS_ACUITY_SCORE: 44
ADLS_ACUITY_SCORE: 43
ADLS_ACUITY_SCORE: 39
ADLS_ACUITY_SCORE: 41
ADLS_ACUITY_SCORE: 44
ADLS_ACUITY_SCORE: 42
ADLS_ACUITY_SCORE: 41
ADLS_ACUITY_SCORE: 41
ADLS_ACUITY_SCORE: 35
ADLS_ACUITY_SCORE: 41
ADLS_ACUITY_SCORE: 45
ADLS_ACUITY_SCORE: 43
ADLS_ACUITY_SCORE: 37
ADLS_ACUITY_SCORE: 41
ADLS_ACUITY_SCORE: 41
ADLS_ACUITY_SCORE: 40
ADLS_ACUITY_SCORE: 41
ADLS_ACUITY_SCORE: 38
ADLS_ACUITY_SCORE: 43
ADLS_ACUITY_SCORE: 45
ADLS_ACUITY_SCORE: 43
ADLS_ACUITY_SCORE: 37
ADLS_ACUITY_SCORE: 43
ADLS_ACUITY_SCORE: 37
ADLS_ACUITY_SCORE: 41
ADLS_ACUITY_SCORE: 41
ADLS_ACUITY_SCORE: 40
ADLS_ACUITY_SCORE: 39
ADLS_ACUITY_SCORE: 37
ADLS_ACUITY_SCORE: 41
ADLS_ACUITY_SCORE: 37
ADLS_ACUITY_SCORE: 38
ADLS_ACUITY_SCORE: 41
ADLS_ACUITY_SCORE: 43
ADLS_ACUITY_SCORE: 39
ADLS_ACUITY_SCORE: 45
ADLS_ACUITY_SCORE: 40
ADLS_ACUITY_SCORE: 39
ADLS_ACUITY_SCORE: 38
ADLS_ACUITY_SCORE: 38
ADLS_ACUITY_SCORE: 39
ADLS_ACUITY_SCORE: 40
ADLS_ACUITY_SCORE: 41
ADLS_ACUITY_SCORE: 39
ADLS_ACUITY_SCORE: 38
ADLS_ACUITY_SCORE: 43
ADLS_ACUITY_SCORE: 40
ADLS_ACUITY_SCORE: 35
ADLS_ACUITY_SCORE: 37
ADLS_ACUITY_SCORE: 45
ADLS_ACUITY_SCORE: 45
ADLS_ACUITY_SCORE: 39
ADLS_ACUITY_SCORE: 40
ADLS_ACUITY_SCORE: 43
ADLS_ACUITY_SCORE: 41
ADLS_ACUITY_SCORE: 43
ADLS_ACUITY_SCORE: 41
ADLS_ACUITY_SCORE: 37
ADLS_ACUITY_SCORE: 35
ADLS_ACUITY_SCORE: 41
ADLS_ACUITY_SCORE: 41
ADLS_ACUITY_SCORE: 43
ADLS_ACUITY_SCORE: 38
ADLS_ACUITY_SCORE: 43
ADLS_ACUITY_SCORE: 38
ADLS_ACUITY_SCORE: 39
ADLS_ACUITY_SCORE: 43
ADLS_ACUITY_SCORE: 38
ADLS_ACUITY_SCORE: 43
ADLS_ACUITY_SCORE: 38
ADLS_ACUITY_SCORE: 42
ADLS_ACUITY_SCORE: 35
ADLS_ACUITY_SCORE: 41
ADLS_ACUITY_SCORE: 43
ADLS_ACUITY_SCORE: 41
ADLS_ACUITY_SCORE: 43
ADLS_ACUITY_SCORE: 43
ADLS_ACUITY_SCORE: 38
ADLS_ACUITY_SCORE: 43
ADLS_ACUITY_SCORE: 45
ADLS_ACUITY_SCORE: 43
ADLS_ACUITY_SCORE: 37
ADLS_ACUITY_SCORE: 38
ADLS_ACUITY_SCORE: 43
ADLS_ACUITY_SCORE: 38
ADLS_ACUITY_SCORE: 43
ADLS_ACUITY_SCORE: 36
ADLS_ACUITY_SCORE: 41
ADLS_ACUITY_SCORE: 41
ADLS_ACUITY_SCORE: 40
ADLS_ACUITY_SCORE: 34
ADLS_ACUITY_SCORE: 40
ADLS_ACUITY_SCORE: 34
ADLS_ACUITY_SCORE: 40
ADLS_ACUITY_SCORE: 42
ADLS_ACUITY_SCORE: 39
ADLS_ACUITY_SCORE: 43
ADLS_ACUITY_SCORE: 40
ADLS_ACUITY_SCORE: 39
ADLS_ACUITY_SCORE: 39
ADLS_ACUITY_SCORE: 45
ADLS_ACUITY_SCORE: 40
ADLS_ACUITY_SCORE: 37
ADLS_ACUITY_SCORE: 35
ADLS_ACUITY_SCORE: 33
ADLS_ACUITY_SCORE: 34
ADLS_ACUITY_SCORE: 43
ADLS_ACUITY_SCORE: 43
ADLS_ACUITY_SCORE: 35
ADLS_ACUITY_SCORE: 39
ADLS_ACUITY_SCORE: 46
ADLS_ACUITY_SCORE: 43
ADLS_ACUITY_SCORE: 45
ADLS_ACUITY_SCORE: 39
ADLS_ACUITY_SCORE: 35
ADLS_ACUITY_SCORE: 39
ADLS_ACUITY_SCORE: 41
ADLS_ACUITY_SCORE: 39
ADLS_ACUITY_SCORE: 39
ADLS_ACUITY_SCORE: 38
ADLS_ACUITY_SCORE: 42
ADLS_ACUITY_SCORE: 43
ADLS_ACUITY_SCORE: 43
ADLS_ACUITY_SCORE: 44
ADLS_ACUITY_SCORE: 37
ADLS_ACUITY_SCORE: 35
ADLS_ACUITY_SCORE: 45
ADLS_ACUITY_SCORE: 43
ADLS_ACUITY_SCORE: 43
ADLS_ACUITY_SCORE: 41
ADLS_ACUITY_SCORE: 39
ADLS_ACUITY_SCORE: 47
ADLS_ACUITY_SCORE: 37
ADLS_ACUITY_SCORE: 45
ADLS_ACUITY_SCORE: 43
ADLS_ACUITY_SCORE: 41
ADLS_ACUITY_SCORE: 39
ADLS_ACUITY_SCORE: 39
ADLS_ACUITY_SCORE: 44
ADLS_ACUITY_SCORE: 43
ADLS_ACUITY_SCORE: 39
ADLS_ACUITY_SCORE: 43
ADLS_ACUITY_SCORE: 41
ADLS_ACUITY_SCORE: 38
ADLS_ACUITY_SCORE: 43
ADLS_ACUITY_SCORE: 34
ADLS_ACUITY_SCORE: 36
ADLS_ACUITY_SCORE: 39
ADLS_ACUITY_SCORE: 43
ADLS_ACUITY_SCORE: 40
ADLS_ACUITY_SCORE: 39
ADLS_ACUITY_SCORE: 45
ADLS_ACUITY_SCORE: 43
ADLS_ACUITY_SCORE: 39
ADLS_ACUITY_SCORE: 45
ADLS_ACUITY_SCORE: 40
ADLS_ACUITY_SCORE: 42
ADLS_ACUITY_SCORE: 41
ADLS_ACUITY_SCORE: 41
ADLS_ACUITY_SCORE: 40
ADLS_ACUITY_SCORE: 41
ADLS_ACUITY_SCORE: 45
ADLS_ACUITY_SCORE: 45
ADLS_ACUITY_SCORE: 43
ADLS_ACUITY_SCORE: 41
ADLS_ACUITY_SCORE: 43
ADLS_ACUITY_SCORE: 43
ADLS_ACUITY_SCORE: 37
ADLS_ACUITY_SCORE: 43
ADLS_ACUITY_SCORE: 50
ADLS_ACUITY_SCORE: 34
ADLS_ACUITY_SCORE: 33
ADLS_ACUITY_SCORE: 43
ADLS_ACUITY_SCORE: 41
ADLS_ACUITY_SCORE: 43
ADLS_ACUITY_SCORE: 44
ADLS_ACUITY_SCORE: 43
ADLS_ACUITY_SCORE: 43
ADLS_ACUITY_SCORE: 37
ADLS_ACUITY_SCORE: 40
ADLS_ACUITY_SCORE: 43
ADLS_ACUITY_SCORE: 41
ADLS_ACUITY_SCORE: 39
ADLS_ACUITY_SCORE: 50
ADLS_ACUITY_SCORE: 43
ADLS_ACUITY_SCORE: 47
ADLS_ACUITY_SCORE: 39
ADLS_ACUITY_SCORE: 45
ADLS_ACUITY_SCORE: 43
ADLS_ACUITY_SCORE: 39
ADLS_ACUITY_SCORE: 35
ADLS_ACUITY_SCORE: 43
ADLS_ACUITY_SCORE: 45
ADLS_ACUITY_SCORE: 39
ADLS_ACUITY_SCORE: 45
ADLS_ACUITY_SCORE: 43
ADLS_ACUITY_SCORE: 39
ADLS_ACUITY_SCORE: 43
ADLS_ACUITY_SCORE: 41
ADLS_ACUITY_SCORE: 43
ADLS_ACUITY_SCORE: 37
ADLS_ACUITY_SCORE: 39
ADLS_ACUITY_SCORE: 41
ADLS_ACUITY_SCORE: 39
ADLS_ACUITY_SCORE: 41
ADLS_ACUITY_SCORE: 43
ADLS_ACUITY_SCORE: 43
ADLS_ACUITY_SCORE: 40
ADLS_ACUITY_SCORE: 37
ADLS_ACUITY_SCORE: 38
ADLS_ACUITY_SCORE: 41
ADLS_ACUITY_SCORE: 37
ADLS_ACUITY_SCORE: 40
ADLS_ACUITY_SCORE: 41
ADLS_ACUITY_SCORE: 41
ADLS_ACUITY_SCORE: 37
ADLS_ACUITY_SCORE: 38
ADLS_ACUITY_SCORE: 40
ADLS_ACUITY_SCORE: 41
ADLS_ACUITY_SCORE: 41
ADLS_ACUITY_SCORE: 43
ADLS_ACUITY_SCORE: 43
ADLS_ACUITY_SCORE: 42
ADLS_ACUITY_SCORE: 35
ADLS_ACUITY_SCORE: 41
ADLS_ACUITY_SCORE: 43
ADLS_ACUITY_SCORE: 40
ADLS_ACUITY_SCORE: 39
ADLS_ACUITY_SCORE: 41
ADLS_ACUITY_SCORE: 39
ADLS_ACUITY_SCORE: 43
ADLS_ACUITY_SCORE: 34
ADLS_ACUITY_SCORE: 38
ADLS_ACUITY_SCORE: 41
ADLS_ACUITY_SCORE: 41
ADLS_ACUITY_SCORE: 37
ADLS_ACUITY_SCORE: 38
ADLS_ACUITY_SCORE: 43
ADLS_ACUITY_SCORE: 42
ADLS_ACUITY_SCORE: 39
ADLS_ACUITY_SCORE: 41
ADLS_ACUITY_SCORE: 41
ADLS_ACUITY_SCORE: 45
ADLS_ACUITY_SCORE: 37
ADLS_ACUITY_SCORE: 41
ADLS_ACUITY_SCORE: 41
ADLS_ACUITY_SCORE: 37
ADLS_ACUITY_SCORE: 37
ADLS_ACUITY_SCORE: 39
ADLS_ACUITY_SCORE: 48
ADLS_ACUITY_SCORE: 35
ADLS_ACUITY_SCORE: 43
ADLS_ACUITY_SCORE: 37
ADLS_ACUITY_SCORE: 40
ADLS_ACUITY_SCORE: 41
ADLS_ACUITY_SCORE: 39
ADLS_ACUITY_SCORE: 37
ADLS_ACUITY_SCORE: 45
ADLS_ACUITY_SCORE: 39
ADLS_ACUITY_SCORE: 37
ADLS_ACUITY_SCORE: 39
ADLS_ACUITY_SCORE: 37
ADLS_ACUITY_SCORE: 41
ADLS_ACUITY_SCORE: 37
ADLS_ACUITY_SCORE: 38
ADLS_ACUITY_SCORE: 38
ADLS_ACUITY_SCORE: 40
ADLS_ACUITY_SCORE: 45
ADLS_ACUITY_SCORE: 41
ADLS_ACUITY_SCORE: 38
ADLS_ACUITY_SCORE: 43
ADLS_ACUITY_SCORE: 45
ADLS_ACUITY_SCORE: 38
ADLS_ACUITY_SCORE: 41
ADLS_ACUITY_SCORE: 45
ADLS_ACUITY_SCORE: 41
ADLS_ACUITY_SCORE: 45
ADLS_ACUITY_SCORE: 43
ADLS_ACUITY_SCORE: 41
ADLS_ACUITY_SCORE: 45
ADLS_ACUITY_SCORE: 37
ADLS_ACUITY_SCORE: 35
ADLS_ACUITY_SCORE: 45
ADLS_ACUITY_SCORE: 39
ADLS_ACUITY_SCORE: 41
ADLS_ACUITY_SCORE: 43
ADLS_ACUITY_SCORE: 39
ADLS_ACUITY_SCORE: 45
ADLS_ACUITY_SCORE: 41
ADLS_ACUITY_SCORE: 43
ADLS_ACUITY_SCORE: 45
ADLS_ACUITY_SCORE: 39
ADLS_ACUITY_SCORE: 41
ADLS_ACUITY_SCORE: 40
ADLS_ACUITY_SCORE: 45
ADLS_ACUITY_SCORE: 39
ADLS_ACUITY_SCORE: 35
ADLS_ACUITY_SCORE: 37
ADLS_ACUITY_SCORE: 43
ADLS_ACUITY_SCORE: 42
ADLS_ACUITY_SCORE: 38
ADLS_ACUITY_SCORE: 38
ADLS_ACUITY_SCORE: 50
ADLS_ACUITY_SCORE: 45
ADLS_ACUITY_SCORE: 38
ADLS_ACUITY_SCORE: 43
ADLS_ACUITY_SCORE: 43
ADLS_ACUITY_SCORE: 40
ADLS_ACUITY_SCORE: 44
ADLS_ACUITY_SCORE: 40
ADLS_ACUITY_SCORE: 41
ADLS_ACUITY_SCORE: 37
ADLS_ACUITY_SCORE: 43
ADLS_ACUITY_SCORE: 39
ADLS_ACUITY_SCORE: 39
ADLS_ACUITY_SCORE: 38
ADLS_ACUITY_SCORE: 43
ADLS_ACUITY_SCORE: 41
ADLS_ACUITY_SCORE: 41
ADLS_ACUITY_SCORE: 38
ADLS_ACUITY_SCORE: 43
ADLS_ACUITY_SCORE: 41
ADLS_ACUITY_SCORE: 45
ADLS_ACUITY_SCORE: 38
ADLS_ACUITY_SCORE: 41
ADLS_ACUITY_SCORE: 45
ADLS_ACUITY_SCORE: 43
ADLS_ACUITY_SCORE: 41
ADLS_ACUITY_SCORE: 37
ADLS_ACUITY_SCORE: 40
ADLS_ACUITY_SCORE: 43
ADLS_ACUITY_SCORE: 43
ADLS_ACUITY_SCORE: 35
ADLS_ACUITY_SCORE: 37
ADLS_ACUITY_SCORE: 35
ADLS_ACUITY_SCORE: 41
ADLS_ACUITY_SCORE: 40
ADLS_ACUITY_SCORE: 50
ADLS_ACUITY_SCORE: 39
ADLS_ACUITY_SCORE: 47
ADLS_ACUITY_SCORE: 43
ADLS_ACUITY_SCORE: 34
ADLS_ACUITY_SCORE: 35
ADLS_ACUITY_SCORE: 39
ADLS_ACUITY_SCORE: 43
ADLS_ACUITY_SCORE: 43
ADLS_ACUITY_SCORE: 35
ADLS_ACUITY_SCORE: 43
ADLS_ACUITY_SCORE: 41
ADLS_ACUITY_SCORE: 45
ADLS_ACUITY_SCORE: 37
ADLS_ACUITY_SCORE: 33
ADLS_ACUITY_SCORE: 43
ADLS_ACUITY_SCORE: 40
ADLS_ACUITY_SCORE: 37
ADLS_ACUITY_SCORE: 39
ADLS_ACUITY_SCORE: 42
ADLS_ACUITY_SCORE: 35
ADLS_ACUITY_SCORE: 43
ADLS_ACUITY_SCORE: 37
ADLS_ACUITY_SCORE: 37
ADLS_ACUITY_SCORE: 41
ADLS_ACUITY_SCORE: 41
ADLS_ACUITY_SCORE: 43
ADLS_ACUITY_SCORE: 37
ADLS_ACUITY_SCORE: 37
ADLS_ACUITY_SCORE: 39
ADLS_ACUITY_SCORE: 39
ADLS_ACUITY_SCORE: 43
ADLS_ACUITY_SCORE: 45
ADLS_ACUITY_SCORE: 43
ADLS_ACUITY_SCORE: 41
ADLS_ACUITY_SCORE: 38
ADLS_ACUITY_SCORE: 43
ADLS_ACUITY_SCORE: 45
ADLS_ACUITY_SCORE: 43
ADLS_ACUITY_SCORE: 45
ADLS_ACUITY_SCORE: 41
ADLS_ACUITY_SCORE: 43
ADLS_ACUITY_SCORE: 43
ADLS_ACUITY_SCORE: 41
ADLS_ACUITY_SCORE: 39
ADLS_ACUITY_SCORE: 37
ADLS_ACUITY_SCORE: 47
ADLS_ACUITY_SCORE: 37
ADLS_ACUITY_SCORE: 45
ADLS_ACUITY_SCORE: 41
ADLS_ACUITY_SCORE: 43
ADLS_ACUITY_SCORE: 35
ADLS_ACUITY_SCORE: 41
ADLS_ACUITY_SCORE: 39
ADLS_ACUITY_SCORE: 41
ADLS_ACUITY_SCORE: 45
ADLS_ACUITY_SCORE: 45
ADLS_ACUITY_SCORE: 42
ADLS_ACUITY_SCORE: 43
ADLS_ACUITY_SCORE: 38
ADLS_ACUITY_SCORE: 49
ADLS_ACUITY_SCORE: 37
ADLS_ACUITY_SCORE: 38
ADLS_ACUITY_SCORE: 39
ADLS_ACUITY_SCORE: 39
ADLS_ACUITY_SCORE: 45
ADLS_ACUITY_SCORE: 43
ADLS_ACUITY_SCORE: 43
ADLS_ACUITY_SCORE: 38
ADLS_ACUITY_SCORE: 43
ADLS_ACUITY_SCORE: 38
ADLS_ACUITY_SCORE: 43
ADLS_ACUITY_SCORE: 39
ADLS_ACUITY_SCORE: 41
ADLS_ACUITY_SCORE: 33
ADLS_ACUITY_SCORE: 43
ADLS_ACUITY_SCORE: 41
ADLS_ACUITY_SCORE: 40
ADLS_ACUITY_SCORE: 39
ADLS_ACUITY_SCORE: 45
ADLS_ACUITY_SCORE: 41
ADLS_ACUITY_SCORE: 35
ADLS_ACUITY_SCORE: 43
ADLS_ACUITY_SCORE: 43
ADLS_ACUITY_SCORE: 35
ADLS_ACUITY_SCORE: 43
ADLS_ACUITY_SCORE: 35
ADLS_ACUITY_SCORE: 45
ADLS_ACUITY_SCORE: 39
ADLS_ACUITY_SCORE: 45
ADLS_ACUITY_SCORE: 45
ADLS_ACUITY_SCORE: 35
ADLS_ACUITY_SCORE: 43
ADLS_ACUITY_SCORE: 38
ADLS_ACUITY_SCORE: 43
ADLS_ACUITY_SCORE: 33
ADLS_ACUITY_SCORE: 41
ADLS_ACUITY_SCORE: 43
ADLS_ACUITY_SCORE: 43
ADLS_ACUITY_SCORE: 39
ADLS_ACUITY_SCORE: 45
ADLS_ACUITY_SCORE: 34
ADLS_ACUITY_SCORE: 41
ADLS_ACUITY_SCORE: 41
ADLS_ACUITY_SCORE: 38
ADLS_ACUITY_SCORE: 41
ADLS_ACUITY_SCORE: 43
ADLS_ACUITY_SCORE: 37
ADLS_ACUITY_SCORE: 39
ADLS_ACUITY_SCORE: 45
ADLS_ACUITY_SCORE: 40
ADLS_ACUITY_SCORE: 40
ADLS_ACUITY_SCORE: 43
ADLS_ACUITY_SCORE: 43
ADLS_ACUITY_SCORE: 42
ADLS_ACUITY_SCORE: 35
ADLS_ACUITY_SCORE: 41
ADLS_ACUITY_SCORE: 45
ADLS_ACUITY_SCORE: 43
ADLS_ACUITY_SCORE: 44
ADLS_ACUITY_SCORE: 43
ADLS_ACUITY_SCORE: 39
ADLS_ACUITY_SCORE: 43
ADLS_ACUITY_SCORE: 41
ADLS_ACUITY_SCORE: 39
ADLS_ACUITY_SCORE: 41
ADLS_ACUITY_SCORE: 38
ADLS_ACUITY_SCORE: 37
ADLS_ACUITY_SCORE: 45
ADLS_ACUITY_SCORE: 35
ADLS_ACUITY_SCORE: 41
ADLS_ACUITY_SCORE: 41
ADLS_ACUITY_SCORE: 47
ADLS_ACUITY_SCORE: 45
ADLS_ACUITY_SCORE: 35
ADLS_ACUITY_SCORE: 45
ADLS_ACUITY_SCORE: 45
ADLS_ACUITY_SCORE: 38
ADLS_ACUITY_SCORE: 37
ADLS_ACUITY_SCORE: 39
ADLS_ACUITY_SCORE: 45
ADLS_ACUITY_SCORE: 35
ADLS_ACUITY_SCORE: 45
ADLS_ACUITY_SCORE: 37
ADLS_ACUITY_SCORE: 45
ADLS_ACUITY_SCORE: 34
ADLS_ACUITY_SCORE: 38
ADLS_ACUITY_SCORE: 41
ADLS_ACUITY_SCORE: 41
ADLS_ACUITY_SCORE: 43
ADLS_ACUITY_SCORE: 43
ADLS_ACUITY_SCORE: 37
ADLS_ACUITY_SCORE: 41
ADLS_ACUITY_SCORE: 43
ADLS_ACUITY_SCORE: 38
ADLS_ACUITY_SCORE: 41
ADLS_ACUITY_SCORE: 43
ADLS_ACUITY_SCORE: 39
ADLS_ACUITY_SCORE: 40
ADLS_ACUITY_SCORE: 39
ADLS_ACUITY_SCORE: 41
ADLS_ACUITY_SCORE: 43
ADLS_ACUITY_SCORE: 42
ADLS_ACUITY_SCORE: 39
ADLS_ACUITY_SCORE: 43
ADLS_ACUITY_SCORE: 35
ADLS_ACUITY_SCORE: 38
ADLS_ACUITY_SCORE: 38
ADLS_ACUITY_SCORE: 43
ADLS_ACUITY_SCORE: 39
ADLS_ACUITY_SCORE: 39
ADLS_ACUITY_SCORE: 43
ADLS_ACUITY_SCORE: 40
ADLS_ACUITY_SCORE: 43
ADLS_ACUITY_SCORE: 43
ADLS_ACUITY_SCORE: 39
ADLS_ACUITY_SCORE: 37
ADLS_ACUITY_SCORE: 37
ADLS_ACUITY_SCORE: 41
ADLS_ACUITY_SCORE: 41
ADLS_ACUITY_SCORE: 39
ADLS_ACUITY_SCORE: 43
ADLS_ACUITY_SCORE: 43
ADLS_ACUITY_SCORE: 45
ADLS_ACUITY_SCORE: 35
ADLS_ACUITY_SCORE: 38
ADLS_ACUITY_SCORE: 43
ADLS_ACUITY_SCORE: 41
ADLS_ACUITY_SCORE: 39
ADLS_ACUITY_SCORE: 39
ADLS_ACUITY_SCORE: 45
ADLS_ACUITY_SCORE: 35
ADLS_ACUITY_SCORE: 36
ADLS_ACUITY_SCORE: 39
ADLS_ACUITY_SCORE: 39
ADLS_ACUITY_SCORE: 37
ADLS_ACUITY_SCORE: 41
ADLS_ACUITY_SCORE: 40
ADLS_ACUITY_SCORE: 45
ADLS_ACUITY_SCORE: 44
ADLS_ACUITY_SCORE: 39
ADLS_ACUITY_SCORE: 43
ADLS_ACUITY_SCORE: 35
ADLS_ACUITY_SCORE: 41
ADLS_ACUITY_SCORE: 42
ADLS_ACUITY_SCORE: 37
ADLS_ACUITY_SCORE: 41
ADLS_ACUITY_SCORE: 38
ADLS_ACUITY_SCORE: 41
ADLS_ACUITY_SCORE: 42
ADLS_ACUITY_SCORE: 43
ADLS_ACUITY_SCORE: 43
ADLS_ACUITY_SCORE: 37
ADLS_ACUITY_SCORE: 43
ADLS_ACUITY_SCORE: 37
ADLS_ACUITY_SCORE: 45
ADLS_ACUITY_SCORE: 39
ADLS_ACUITY_SCORE: 39
ADLS_ACUITY_SCORE: 41
ADLS_ACUITY_SCORE: 43
ADLS_ACUITY_SCORE: 42
ADLS_ACUITY_SCORE: 35
ADLS_ACUITY_SCORE: 43
ADLS_ACUITY_SCORE: 45
ADLS_ACUITY_SCORE: 35
ADLS_ACUITY_SCORE: 43
ADLS_ACUITY_SCORE: 45
ADLS_ACUITY_SCORE: 38
ADLS_ACUITY_SCORE: 42
ADLS_ACUITY_SCORE: 40
ADLS_ACUITY_SCORE: 50
ADLS_ACUITY_SCORE: 45
ADLS_ACUITY_SCORE: 34
ADLS_ACUITY_SCORE: 37
ADLS_ACUITY_SCORE: 45
ADLS_ACUITY_SCORE: 43
ADLS_ACUITY_SCORE: 43
ADLS_ACUITY_SCORE: 39
ADLS_ACUITY_SCORE: 43
ADLS_ACUITY_SCORE: 45
ADLS_ACUITY_SCORE: 43
ADLS_ACUITY_SCORE: 45
ADLS_ACUITY_SCORE: 45
ADLS_ACUITY_SCORE: 38
ADLS_ACUITY_SCORE: 43
ADLS_ACUITY_SCORE: 40
ADLS_ACUITY_SCORE: 43
ADLS_ACUITY_SCORE: 43
ADLS_ACUITY_SCORE: 44
ADLS_ACUITY_SCORE: 38
ADLS_ACUITY_SCORE: 37
ADLS_ACUITY_SCORE: 39
ADLS_ACUITY_SCORE: 39
ADLS_ACUITY_SCORE: 40
ADLS_ACUITY_SCORE: 43
ADLS_ACUITY_SCORE: 33
ADLS_ACUITY_SCORE: 38
ADLS_ACUITY_SCORE: 39
ADLS_ACUITY_SCORE: 43
ADLS_ACUITY_SCORE: 43
ADLS_ACUITY_SCORE: 40
ADLS_ACUITY_SCORE: 43
ADLS_ACUITY_SCORE: 41
ADLS_ACUITY_SCORE: 43
ADLS_ACUITY_SCORE: 43
ADLS_ACUITY_SCORE: 37
ADLS_ACUITY_SCORE: 40
ADLS_ACUITY_SCORE: 41
ADLS_ACUITY_SCORE: 38
ADLS_ACUITY_SCORE: 41
ADLS_ACUITY_SCORE: 39
ADLS_ACUITY_SCORE: 41
ADLS_ACUITY_SCORE: 37
ADLS_ACUITY_SCORE: 45
ADLS_ACUITY_SCORE: 41
ADLS_ACUITY_SCORE: 39
ADLS_ACUITY_SCORE: 39
ADLS_ACUITY_SCORE: 37
ADLS_ACUITY_SCORE: 41
ADLS_ACUITY_SCORE: 47
ADLS_ACUITY_SCORE: 43
ADLS_ACUITY_SCORE: 41
ADLS_ACUITY_SCORE: 43
ADLS_ACUITY_SCORE: 41
ADLS_ACUITY_SCORE: 41
ADLS_ACUITY_SCORE: 43
ADLS_ACUITY_SCORE: 41
ADLS_ACUITY_SCORE: 45
ADLS_ACUITY_SCORE: 45
ADLS_ACUITY_SCORE: 43
ADLS_ACUITY_SCORE: 35
ADLS_ACUITY_SCORE: 41
ADLS_ACUITY_SCORE: 38
ADLS_ACUITY_SCORE: 37
ADLS_ACUITY_SCORE: 39
ADLS_ACUITY_SCORE: 41
ADLS_ACUITY_SCORE: 43
ADLS_ACUITY_SCORE: 41
ADLS_ACUITY_SCORE: 43
ADLS_ACUITY_SCORE: 44
ADLS_ACUITY_SCORE: 39
ADLS_ACUITY_SCORE: 45
ADLS_ACUITY_SCORE: 39
ADLS_ACUITY_SCORE: 43
ADLS_ACUITY_SCORE: 41
ADLS_ACUITY_SCORE: 40
ADLS_ACUITY_SCORE: 39
ADLS_ACUITY_SCORE: 34
ADLS_ACUITY_SCORE: 41
ADLS_ACUITY_SCORE: 43
ADLS_ACUITY_SCORE: 43
ADLS_ACUITY_SCORE: 41
ADLS_ACUITY_SCORE: 41
ADLS_ACUITY_SCORE: 34
ADLS_ACUITY_SCORE: 40
ADLS_ACUITY_SCORE: 44
ADLS_ACUITY_SCORE: 41
ADLS_ACUITY_SCORE: 43
ADLS_ACUITY_SCORE: 41
ADLS_ACUITY_SCORE: 41
ADLS_ACUITY_SCORE: 39
ADLS_ACUITY_SCORE: 41
ADLS_ACUITY_SCORE: 39
ADLS_ACUITY_SCORE: 41
ADLS_ACUITY_SCORE: 43
ADLS_ACUITY_SCORE: 39
ADLS_ACUITY_SCORE: 40
ADLS_ACUITY_SCORE: 41
ADLS_ACUITY_SCORE: 39
ADLS_ACUITY_SCORE: 45
ADLS_ACUITY_SCORE: 43
ADLS_ACUITY_SCORE: 37
ADLS_ACUITY_SCORE: 38
ADLS_ACUITY_SCORE: 33
ADLS_ACUITY_SCORE: 45
ADLS_ACUITY_SCORE: 40
ADLS_ACUITY_SCORE: 39
ADLS_ACUITY_SCORE: 35
ADLS_ACUITY_SCORE: 40
ADLS_ACUITY_SCORE: 37
ADLS_ACUITY_SCORE: 37
ADLS_ACUITY_SCORE: 41
ADLS_ACUITY_SCORE: 38
ADLS_ACUITY_SCORE: 42
ADLS_ACUITY_SCORE: 40
ADLS_ACUITY_SCORE: 35
ADLS_ACUITY_SCORE: 39
ADLS_ACUITY_SCORE: 41
ADLS_ACUITY_SCORE: 39
ADLS_ACUITY_SCORE: 39
ADLS_ACUITY_SCORE: 40
ADLS_ACUITY_SCORE: 42
ADLS_ACUITY_SCORE: 43
ADLS_ACUITY_SCORE: 43
ADLS_ACUITY_SCORE: 39
ADLS_ACUITY_SCORE: 45
ADLS_ACUITY_SCORE: 42
ADLS_ACUITY_SCORE: 43
ADLS_ACUITY_SCORE: 43
ADLS_ACUITY_SCORE: 39
ADLS_ACUITY_SCORE: 43
ADLS_ACUITY_SCORE: 43
ADLS_ACUITY_SCORE: 39
ADLS_ACUITY_SCORE: 39
ADLS_ACUITY_SCORE: 45
ADLS_ACUITY_SCORE: 45
ADLS_ACUITY_SCORE: 40
ADLS_ACUITY_SCORE: 39
ADLS_ACUITY_SCORE: 49
ADLS_ACUITY_SCORE: 43
ADLS_ACUITY_SCORE: 43
ADLS_ACUITY_SCORE: 37
ADLS_ACUITY_SCORE: 39
ADLS_ACUITY_SCORE: 43
ADLS_ACUITY_SCORE: 41
ADLS_ACUITY_SCORE: 45
ADLS_ACUITY_SCORE: 43
ADLS_ACUITY_SCORE: 41
ADLS_ACUITY_SCORE: 41
ADLS_ACUITY_SCORE: 43
ADLS_ACUITY_SCORE: 43
ADLS_ACUITY_SCORE: 45
ADLS_ACUITY_SCORE: 39
ADLS_ACUITY_SCORE: 37
ADLS_ACUITY_SCORE: 41
ADLS_ACUITY_SCORE: 41
ADLS_ACUITY_SCORE: 43
ADLS_ACUITY_SCORE: 43
ADLS_ACUITY_SCORE: 45
ADLS_ACUITY_SCORE: 43
ADLS_ACUITY_SCORE: 43
ADLS_ACUITY_SCORE: 41
ADLS_ACUITY_SCORE: 38
ADLS_ACUITY_SCORE: 50
ADLS_ACUITY_SCORE: 36
ADLS_ACUITY_SCORE: 43
ADLS_ACUITY_SCORE: 39
ADLS_ACUITY_SCORE: 35
ADLS_ACUITY_SCORE: 40
ADLS_ACUITY_SCORE: 45
ADLS_ACUITY_SCORE: 38
ADLS_ACUITY_SCORE: 45
ADLS_ACUITY_SCORE: 37
ADLS_ACUITY_SCORE: 37
ADLS_ACUITY_SCORE: 43
ADLS_ACUITY_SCORE: 39
ADLS_ACUITY_SCORE: 39
ADLS_ACUITY_SCORE: 40
ADLS_ACUITY_SCORE: 45
ADLS_ACUITY_SCORE: 43
ADLS_ACUITY_SCORE: 41
ADLS_ACUITY_SCORE: 43
ADLS_ACUITY_SCORE: 40
ADLS_ACUITY_SCORE: 39
ADLS_ACUITY_SCORE: 37
ADLS_ACUITY_SCORE: 45
ADLS_ACUITY_SCORE: 43
ADLS_ACUITY_SCORE: 41
ADLS_ACUITY_SCORE: 42
ADLS_ACUITY_SCORE: 46
ADLS_ACUITY_SCORE: 41
ADLS_ACUITY_SCORE: 43
ADLS_ACUITY_SCORE: 39
ADLS_ACUITY_SCORE: 38
ADLS_ACUITY_SCORE: 37
ADLS_ACUITY_SCORE: 41
ADLS_ACUITY_SCORE: 39
ADLS_ACUITY_SCORE: 45
ADLS_ACUITY_SCORE: 43
ADLS_ACUITY_SCORE: 37
ADLS_ACUITY_SCORE: 41
ADLS_ACUITY_SCORE: 39
ADLS_ACUITY_SCORE: 43
ADLS_ACUITY_SCORE: 41
ADLS_ACUITY_SCORE: 40
ADLS_ACUITY_SCORE: 34
ADLS_ACUITY_SCORE: 39
ADLS_ACUITY_SCORE: 35
ADLS_ACUITY_SCORE: 39
ADLS_ACUITY_SCORE: 43
ADLS_ACUITY_SCORE: 37
ADLS_ACUITY_SCORE: 43
ADLS_ACUITY_SCORE: 44
ADLS_ACUITY_SCORE: 38
ADLS_ACUITY_SCORE: 43
ADLS_ACUITY_SCORE: 41
ADLS_ACUITY_SCORE: 37
ADLS_ACUITY_SCORE: 36
ADLS_ACUITY_SCORE: 42
ADLS_ACUITY_SCORE: 40
ADLS_ACUITY_SCORE: 41
ADLS_ACUITY_SCORE: 43
ADLS_ACUITY_SCORE: 43
ADLS_ACUITY_SCORE: 41
ADLS_ACUITY_SCORE: 43
ADLS_ACUITY_SCORE: 35
ADLS_ACUITY_SCORE: 43
ADLS_ACUITY_SCORE: 43
ADLS_ACUITY_SCORE: 41
ADLS_ACUITY_SCORE: 39
ADLS_ACUITY_SCORE: 50
ADLS_ACUITY_SCORE: 45
ADLS_ACUITY_SCORE: 42
ADLS_ACUITY_SCORE: 46
ADLS_ACUITY_SCORE: 43
ADLS_ACUITY_SCORE: 43
ADLS_ACUITY_SCORE: 45
ADLS_ACUITY_SCORE: 43
ADLS_ACUITY_SCORE: 45
ADLS_ACUITY_SCORE: 43
ADLS_ACUITY_SCORE: 35
ADLS_ACUITY_SCORE: 43
ADLS_ACUITY_SCORE: 39
ADLS_ACUITY_SCORE: 35
ADLS_ACUITY_SCORE: 41
ADLS_ACUITY_SCORE: 37
ADLS_ACUITY_SCORE: 37
ADLS_ACUITY_SCORE: 40
ADLS_ACUITY_SCORE: 45
ADLS_ACUITY_SCORE: 40
ADLS_ACUITY_SCORE: 37
ADLS_ACUITY_SCORE: 39
ADLS_ACUITY_SCORE: 39
ADLS_ACUITY_SCORE: 41
ADLS_ACUITY_SCORE: 43
ADLS_ACUITY_SCORE: 39
ADLS_ACUITY_SCORE: 37
ADLS_ACUITY_SCORE: 40
ADLS_ACUITY_SCORE: 41
ADLS_ACUITY_SCORE: 40
ADLS_ACUITY_SCORE: 45
ADLS_ACUITY_SCORE: 41
ADLS_ACUITY_SCORE: 41
ADLS_ACUITY_SCORE: 38
ADLS_ACUITY_SCORE: 35
ADLS_ACUITY_SCORE: 41
ADLS_ACUITY_SCORE: 38
ADLS_ACUITY_SCORE: 41
ADLS_ACUITY_SCORE: 45
ADLS_ACUITY_SCORE: 37
ADLS_ACUITY_SCORE: 41
ADLS_ACUITY_SCORE: 42
ADLS_ACUITY_SCORE: 45
ADLS_ACUITY_SCORE: 43
ADLS_ACUITY_SCORE: 37
ADLS_ACUITY_SCORE: 43
ADLS_ACUITY_SCORE: 39
ADLS_ACUITY_SCORE: 41
ADLS_ACUITY_SCORE: 50
ADLS_ACUITY_SCORE: 39
ADLS_ACUITY_SCORE: 40
ADLS_ACUITY_SCORE: 45
ADLS_ACUITY_SCORE: 41
ADLS_ACUITY_SCORE: 40
ADLS_ACUITY_SCORE: 43
ADLS_ACUITY_SCORE: 38
ADLS_ACUITY_SCORE: 39
ADLS_ACUITY_SCORE: 38
ADLS_ACUITY_SCORE: 42
ADLS_ACUITY_SCORE: 45
ADLS_ACUITY_SCORE: 43
ADLS_ACUITY_SCORE: 45
ADLS_ACUITY_SCORE: 41
ADLS_ACUITY_SCORE: 40
ADLS_ACUITY_SCORE: 37
ADLS_ACUITY_SCORE: 39
ADLS_ACUITY_SCORE: 43
ADLS_ACUITY_SCORE: 41
ADLS_ACUITY_SCORE: 39
ADLS_ACUITY_SCORE: 43
ADLS_ACUITY_SCORE: 41
ADLS_ACUITY_SCORE: 43
ADLS_ACUITY_SCORE: 39
ADLS_ACUITY_SCORE: 41
ADLS_ACUITY_SCORE: 41
ADLS_ACUITY_SCORE: 39
ADLS_ACUITY_SCORE: 41
ADLS_ACUITY_SCORE: 43
ADLS_ACUITY_SCORE: 37
ADLS_ACUITY_SCORE: 37
ADLS_ACUITY_SCORE: 42
ADLS_ACUITY_SCORE: 43
ADLS_ACUITY_SCORE: 44
ADLS_ACUITY_SCORE: 43
ADLS_ACUITY_SCORE: 45
ADLS_ACUITY_SCORE: 42
ADLS_ACUITY_SCORE: 40
ADLS_ACUITY_SCORE: 37
ADLS_ACUITY_SCORE: 47
ADLS_ACUITY_SCORE: 39
ADLS_ACUITY_SCORE: 43
ADLS_ACUITY_SCORE: 41
ADLS_ACUITY_SCORE: 39
ADLS_ACUITY_SCORE: 37
ADLS_ACUITY_SCORE: 39
ADLS_ACUITY_SCORE: 39
ADLS_ACUITY_SCORE: 40
ADLS_ACUITY_SCORE: 41
ADLS_ACUITY_SCORE: 39
ADLS_ACUITY_SCORE: 48
ADLS_ACUITY_SCORE: 45
ADLS_ACUITY_SCORE: 37
ADLS_ACUITY_SCORE: 43
ADLS_ACUITY_SCORE: 50
ADLS_ACUITY_SCORE: 41
ADLS_ACUITY_SCORE: 40
ADLS_ACUITY_SCORE: 45
ADLS_ACUITY_SCORE: 43
ADLS_ACUITY_SCORE: 42
ADLS_ACUITY_SCORE: 41
ADLS_ACUITY_SCORE: 43
ADLS_ACUITY_SCORE: 45
ADLS_ACUITY_SCORE: 43
ADLS_ACUITY_SCORE: 43
ADLS_ACUITY_SCORE: 41
ADLS_ACUITY_SCORE: 43
ADLS_ACUITY_SCORE: 41
ADLS_ACUITY_SCORE: 41
ADLS_ACUITY_SCORE: 37
ADLS_ACUITY_SCORE: 43
ADLS_ACUITY_SCORE: 40
ADLS_ACUITY_SCORE: 41
ADLS_ACUITY_SCORE: 37
ADLS_ACUITY_SCORE: 41
ADLS_ACUITY_SCORE: 35
ADLS_ACUITY_SCORE: 38
ADLS_ACUITY_SCORE: 43
ADLS_ACUITY_SCORE: 45
ADLS_ACUITY_SCORE: 37
ADLS_ACUITY_SCORE: 35
ADLS_ACUITY_SCORE: 37
ADLS_ACUITY_SCORE: 41
ADLS_ACUITY_SCORE: 37
ADLS_ACUITY_SCORE: 38
ADLS_ACUITY_SCORE: 39
ADLS_ACUITY_SCORE: 41
ADLS_ACUITY_SCORE: 38
ADLS_ACUITY_SCORE: 45
ADLS_ACUITY_SCORE: 42
ADLS_ACUITY_SCORE: 43
ADLS_ACUITY_SCORE: 45
ADLS_ACUITY_SCORE: 40
ADLS_ACUITY_SCORE: 45
ADLS_ACUITY_SCORE: 41
ADLS_ACUITY_SCORE: 39
ADLS_ACUITY_SCORE: 41
ADLS_ACUITY_SCORE: 39
ADLS_ACUITY_SCORE: 37
ADLS_ACUITY_SCORE: 39
ADLS_ACUITY_SCORE: 43
ADLS_ACUITY_SCORE: 45
ADLS_ACUITY_SCORE: 39
ADLS_ACUITY_SCORE: 34
ADLS_ACUITY_SCORE: 43
ADLS_ACUITY_SCORE: 41
ADLS_ACUITY_SCORE: 37
ADLS_ACUITY_SCORE: 41
ADLS_ACUITY_SCORE: 38
ADLS_ACUITY_SCORE: 37
ADLS_ACUITY_SCORE: 43
ADLS_ACUITY_SCORE: 43
ADLS_ACUITY_SCORE: 41
ADLS_ACUITY_SCORE: 45
ADLS_ACUITY_SCORE: 43
ADLS_ACUITY_SCORE: 37
ADLS_ACUITY_SCORE: 40
ADLS_ACUITY_SCORE: 43
ADLS_ACUITY_SCORE: 35
ADLS_ACUITY_SCORE: 38
ADLS_ACUITY_SCORE: 39
ADLS_ACUITY_SCORE: 43
ADLS_ACUITY_SCORE: 41
ADLS_ACUITY_SCORE: 43
ADLS_ACUITY_SCORE: 39
ADLS_ACUITY_SCORE: 41
ADLS_ACUITY_SCORE: 37
ADLS_ACUITY_SCORE: 44
ADLS_ACUITY_SCORE: 41
ADLS_ACUITY_SCORE: 43
ADLS_ACUITY_SCORE: 37
ADLS_ACUITY_SCORE: 41
ADLS_ACUITY_SCORE: 42
ADLS_ACUITY_SCORE: 43
ADLS_ACUITY_SCORE: 39
ADLS_ACUITY_SCORE: 39
ADLS_ACUITY_SCORE: 41
ADLS_ACUITY_SCORE: 38
ADLS_ACUITY_SCORE: 34
ADLS_ACUITY_SCORE: 39
ADLS_ACUITY_SCORE: 37
ADLS_ACUITY_SCORE: 45
ADLS_ACUITY_SCORE: 43
ADLS_ACUITY_SCORE: 40
ADLS_ACUITY_SCORE: 41
ADLS_ACUITY_SCORE: 43
ADLS_ACUITY_SCORE: 41
ADLS_ACUITY_SCORE: 39
ADLS_ACUITY_SCORE: 40
ADLS_ACUITY_SCORE: 43
ADLS_ACUITY_SCORE: 37
ADLS_ACUITY_SCORE: 43
ADLS_ACUITY_SCORE: 40
ADLS_ACUITY_SCORE: 40
ADLS_ACUITY_SCORE: 41
ADLS_ACUITY_SCORE: 39
ADLS_ACUITY_SCORE: 45
ADLS_ACUITY_SCORE: 41
ADLS_ACUITY_SCORE: 45
ADLS_ACUITY_SCORE: 39
ADLS_ACUITY_SCORE: 43
ADLS_ACUITY_SCORE: 37
ADLS_ACUITY_SCORE: 37
ADLS_ACUITY_SCORE: 38
ADLS_ACUITY_SCORE: 40
ADLS_ACUITY_SCORE: 43
ADLS_ACUITY_SCORE: 40
ADLS_ACUITY_SCORE: 34
ADLS_ACUITY_SCORE: 45
ADLS_ACUITY_SCORE: 39
ADLS_ACUITY_SCORE: 39
ADLS_ACUITY_SCORE: 38
ADLS_ACUITY_SCORE: 39
ADLS_ACUITY_SCORE: 38
ADLS_ACUITY_SCORE: 39
ADLS_ACUITY_SCORE: 37
ADLS_ACUITY_SCORE: 43
ADLS_ACUITY_SCORE: 39
ADLS_ACUITY_SCORE: 39
ADLS_ACUITY_SCORE: 45
ADLS_ACUITY_SCORE: 42
ADLS_ACUITY_SCORE: 41
ADLS_ACUITY_SCORE: 41
ADLS_ACUITY_SCORE: 43
ADLS_ACUITY_SCORE: 47
ADLS_ACUITY_SCORE: 43
ADLS_ACUITY_SCORE: 39
ADLS_ACUITY_SCORE: 41
ADLS_ACUITY_SCORE: 45
ADLS_ACUITY_SCORE: 41
ADLS_ACUITY_SCORE: 45
ADLS_ACUITY_SCORE: 39
ADLS_ACUITY_SCORE: 40
ADLS_ACUITY_SCORE: 43
ADLS_ACUITY_SCORE: 43
ADLS_ACUITY_SCORE: 41
ADLS_ACUITY_SCORE: 45
ADLS_ACUITY_SCORE: 41
ADLS_ACUITY_SCORE: 41
ADLS_ACUITY_SCORE: 43
ADLS_ACUITY_SCORE: 39
ADLS_ACUITY_SCORE: 38
ADLS_ACUITY_SCORE: 39
ADLS_ACUITY_SCORE: 38
ADLS_ACUITY_SCORE: 39
ADLS_ACUITY_SCORE: 43
ADLS_ACUITY_SCORE: 43
ADLS_ACUITY_SCORE: 37
ADLS_ACUITY_SCORE: 39
ADLS_ACUITY_SCORE: 37
ADLS_ACUITY_SCORE: 40
ADLS_ACUITY_SCORE: 40
ADLS_ACUITY_SCORE: 39
ADLS_ACUITY_SCORE: 43
ADLS_ACUITY_SCORE: 41
ADLS_ACUITY_SCORE: 39
ADLS_ACUITY_SCORE: 43
ADLS_ACUITY_SCORE: 34
ADLS_ACUITY_SCORE: 43
ADLS_ACUITY_SCORE: 43
ADLS_ACUITY_SCORE: 44
ADLS_ACUITY_SCORE: 41
ADLS_ACUITY_SCORE: 39
ADLS_ACUITY_SCORE: 34
ADLS_ACUITY_SCORE: 39
ADLS_ACUITY_SCORE: 41
ADLS_ACUITY_SCORE: 33
ADLS_ACUITY_SCORE: 39
ADLS_ACUITY_SCORE: 37
ADLS_ACUITY_SCORE: 33
ADLS_ACUITY_SCORE: 45
ADLS_ACUITY_SCORE: 35
ADLS_ACUITY_SCORE: 41
ADLS_ACUITY_SCORE: 43
ADLS_ACUITY_SCORE: 43
ADLS_ACUITY_SCORE: 41
ADLS_ACUITY_SCORE: 40

## 2022-01-01 ASSESSMENT — ENCOUNTER SYMPTOMS
CONSTITUTIONAL NEGATIVE: 1
BACK PAIN: 1
SPEECH DIFFICULTY: 1
FACIAL ASYMMETRY: 1

## 2022-01-01 ASSESSMENT — LIFESTYLE VARIABLES: TOBACCO_USE: 1

## 2022-03-17 NOTE — PROGRESS NOTES
"Cristy is a 76 year old who is being evaluated via a billable telephone visit.      What phone number would you like to be contacted at? [phone]  How would you like to obtain your AVS? Mail a copy    Assessment and Plan    (B02.9) Herpes zoster without complication  (primary encounter diagnosis)  Comment: also advised to apply topical lidocaine and Neosporin  Plan: valACYclovir (VALTREX) 1000 mg tablet            (R26.2) Unable to ambulate  Comment: pt is homebound, dependant on others for cares, claims no services currently will refer to Care [facility] for eval and services.  Plan: Primary Care - Care Coordination Referral              RTC in     Gabe Arana MD      Subjective   Cristy is a 76 year old who presents for the following health issues     HPI     Rash  Onset/Duration: 1 week.  Itches. Clusters of reddness  Description  Location: Started out pimples on right arm towards her back and is spreading.   Character: blotchy, raised, burning, red  Itching: severe  Intensity:  moderate  Progression of Symptoms:  worsening  Accompanying signs and symptoms:   Fever: no  Body aches or joint pain: no  Sore throat symptoms: no  Recent cold symptoms: no  History:           Previous episodes of similar rash: None  New exposures:  None  Recent travel: no  Exposure to similar rash: no  Precipitating or alleviating factors: none  Therapies tried and outcome: calamine lotion and zinccream      Terribly itchy starts on back, runs under R arm.  Limited to R side.  Has been getting worse.     Pt notes that she is unable to leave her chair at home, unable to come in for appointment.  Dependant on friend \"Leo\" for supports - shopping, errands, getting medications from pharmacy, etc.  No in-home health care services currently.    Review of Systems   Constitutional: Negative.    Skin: Positive for rash.            Objective    Vitals - Patient Reported  Pain Score: Extreme Pain (8)      Vitals:  No vitals were obtained " today due to virtual visit.    Physical Exam   healthy, alert and mild distress  PSYCH: Alert and oriented times 3; coherent speech, normal   rate and volume, able to articulate logical thoughts, able   to abstract reason, no tangential thoughts, no hallucinations   or delusions  Her affect is tearful  RESP: No cough, no audible wheezing, able to talk in full sentences  Remainder of exam unable to be completed due to telephone visits                Phone call duration: 8 minutes

## 2022-03-18 NOTE — LETTER
[facility] CARE COORDINATION  [address] [address]    March 21, 2022    Cristy Bailey  [address]   [place] [address]      Dear Cristy,    I am a clinic care coordinator who works with Manoj Daley PA-C. I wanted to introduce myself and provide you with my contact information for you to be able to call me with any questions or concerns. Below is a description of clinic care coordination and how I can further assist you.      The clinic care coordination team is made up of a registered nurse,  and community health worker who understand the health care system. The goal of clinic care coordination is to help you manage your health and improve access to the health care system in the most efficient manner. The team can assist you in meeting your health care goals by providing education, coordinating services, strengthening the communication among your providers and supporting you with any resource needs.    Please feel free to contact me with any questions or concerns. We are focused on providing you with the highest-quality healthcare experience possible and that all starts with you.     Sincerely,     Telly [facility]  Clinic Care Coordinator  [facility]  [facility]  [phone]  [email or web address]

## 2022-03-18 NOTE — PROGRESS NOTES
Clinic Care Coordination Contact  [facility]/Voicemail    Referral Source: PCP  Clinical Data: Care Coordinator Outreach  Outreach attempted x 1. Someone other then Cristy answered the phone and said she couldn't come to phone as she was sleeping.  Plan: Care Coordinator will send care coordination introduction letter with care coordinator contact information and explanation of care coordination services via [employer]. Care Coordinator will try to reach patient again in 1-2 business days.      JOSE GUADALUPE Patel   Care Coordination Team  [phone]

## 2022-03-21 NOTE — PROGRESS NOTES
Clinic Care Coordination Contact  [facility]/Voicemail    Referral Source: PCP  Clinical Data: Care Coordinator Outreach  Outreach attempted x 2.  Left message on patient's voicemail with call back information and requested return call.  Plan: Noted no introduction previously sent to pt. Care Coordinator will send care coordination introduction letter with care coordinator contact information and explanation of care coordination services via [employer]. Care Coordinator will do no further outreaches at this time.    Telly Katz HALIE  Clinic Care Coordinator  [facility]  [phone]  [email or web address]

## 2022-04-15 NOTE — TELEPHONE ENCOUNTER
"Received as a priority call.    Patient asking for refill of valtrex. Her shingles is coming back near armpit and she says \"it's real bad\".    Patient seen at [facility] 3/17/22 for this problem. Routing to [place] refill pool.    Karin Nava RN  [facility]  "

## 2022-04-15 NOTE — TELEPHONE ENCOUNTER
Routing refill request to provider for review/approval because:  Prescription ordered for an acute illness.     Saskia Laird RN on 4/15/2022 at 3:58 PM

## 2022-06-14 NOTE — TELEPHONE ENCOUNTER
Reason for call:  Patient reporting a symptom    Symptom or request: Rash    Duration (how long have symptoms been present): a few days    Have you been treated for this before? No    Additional comments: anay called on 06/13/2022 with concerns of a rash, we tried for 20 min to get her onto her mychart to take a picture to be able to do an evisit.  Patient states she can not get out of her chair to come into the clinic to do a visit.    I asked her to take a picture of it and have someone bring it into the clinic to see if the PCP will look at it for her.    **06/14/22** Leo Akbar (sig other) brought a picture of the rash into the clinic. Will Show PCP    Phone Number patient can be reached at:  Home number on file [phone] (home)    Best Time:  any    Can we leave a detailed message on this number:  YES    Call taken on 6/14/2022 at 7:13 AM by Shiresa H. Ormond

## 2022-06-15 NOTE — TELEPHONE ENCOUNTER
Cristy called with concern about a call she received reminding her she has an appointment tomorrow at the clinic.    Cristy stated she is homebound and is not able to get to the clinic.  She said she understood that she was going to have a home visit.    She has a rash.  She said she had someone take a picture of the rash to the clinic.    She'd like to be called with acknowledgement of this message.    Routed:P [id number]  High priority KRISH Barksdale RN [facility]

## 2022-06-16 PROBLEM — I82.402 ACUTE THROMBOEMBOLISM OF DEEP VEINS OF LEFT LOWER EXTREMITY (H): Status: RESOLVED | Noted: 2019-03-04 | Resolved: 2022-01-01

## 2022-06-16 PROBLEM — M87.051 AVASCULAR NECROSIS OF BONE OF HIP, RIGHT (H): Status: RESOLVED | Noted: 2018-07-28 | Resolved: 2022-01-01

## 2022-06-16 NOTE — TELEPHONE ENCOUNTER
Spoke with patient. Worsening swelling in Lt leg past few days. Pain. Denies SOB.    Swelling varies and will go down. Uses ice sometimes and is effective.    Hx cellulitis.    Patient states unable to come to clinic. Difficulty with transportation. Discussed CC and help with future transportation.    Huddled with DM.    Advised if SOB to call 911 for transport to ED. If swelling/pain continues and leg appears infected to call 911 for transport to ED.    Patient hesitant to do this. Discussed what would tell  if needed to call.    Patient agreeable to plan.    Ellie Coon RN

## 2022-06-16 NOTE — PROGRESS NOTES
Cristy is a 77 year old who is being evaluated via a billable telephone visit.      What phone number would you like to be contacted at? [phone]  How would you like to obtain your AVS? MyChart    Assessment & Plan     Rash and nonspecific skin eruption  Treated for shingles in March for same rash but never improved; No necessarily spreading. She has been treated for similar KOH positive rash previously so will attempt similar treatment at this time. This patient does not leave her house and so the only visual we are going off is a photo her friend brought into the clinic. They will set up follow up appt if not improving  - fluconazole (DIFLUCAN) 150 MG tablet; Take 1 tablet (150 mg) by mouth daily for 3 days  - clotrimazole (LOTRIMIN) 1 % external cream; Apply topically 2 times daily    Morbid obesity (H)  Unable to ambulate  Despite bilateral JENAE this patient still struuggles to ambulate on her own. It sounds like she has not left her house in many months. One support friend who lives with her. I am hoping she can be evaluated by home care to assess safety and what interventions may be possible to help with ambulation. I discussed with Cristy as well that ortho eval should be considered as well. CC referral has been placed previously. Ill ccl them on this note to see if they can help with coordination of a home care visit. This patient is a fantastic candidate for CC aide  - Home Care Referral      Return in about 2 weeks (around 6/30/2022) for recheck if symptoms are not improving..    RONNIE Bradshaw [facility]   Cristy is a 77 year old, presenting for the following health issues:  Rash      HPI     Rash  Onset/Duration: couple week, 2 months   Description  Location: right arm   Character: red, blistering  Itching: moderate  Intensity:  moderate  Progression of Symptoms:  same  Accompanying signs and symptoms:   Fever: no  Body aches or joint pain: no  Sore  throat symptoms: no  Recent cold symptoms: no  History:           Previous episodes of similar rash: None  New exposures:  None  Recent travel: no  Exposure to similar rash: no  Precipitating or alleviating factors:   Therapies tried and outcome: none and calamine lotion    Cristy Bailey is a 77 year old female who presents today for TELEPHONE visit for ongoing RASH  Did a virtual visit in March after around 2-3 weeks of the initial rash  Was treated for SHINGLES; no improvements  She didn't notice it right away because she cannot see it  It is definitely itchy; but it is not painful  She is using calamine lotion which is mildly helpful  Has hx of KOH positive rash    Review of Systems   Constitutional, HEENT, cardiovascular, pulmonary, gi and gu systems are negative, except as otherwise noted.      Objective           Vitals:  No vitals were obtained today due to virtual visit.    Physical Exam   healthy, alert and no distress  PSYCH: Alert and oriented times 3; coherent speech, normal   rate and volume, able to articulate logical thoughts, able   to abstract reason, no tangential thoughts, no hallucinations   or delusions  Her affect is normal  RESP: No cough, no audible wheezing, able to talk in full sentences  Remainder of exam unable to be completed due to telephone visits          Phone call duration: 13 minutes    .  ..

## 2022-06-16 NOTE — TELEPHONE ENCOUNTER
[facility]. Call back [phone]    Tori Varela-

## 2022-06-16 NOTE — TELEPHONE ENCOUNTER
Patient calling stating she had a virtual visit today and forgot the most important thing she wanted to talk to Kulwant about.  She states that she has bilateral lower leg swelling (below the knee) x months.  She states that the left is worse than the right.  She states that there is increased redness, dark purple color, some blisters/drainage at times/dry skin, burning, pain (rates 7-8).  She states that she wears compression socks at times (when she can get them on), elevates when she can and applies ice to area.  Wondering if there is something else you can give her to help with the discomfort and to help them get better.  Please review and advise further.    Chastity Salazar RN

## 2022-06-22 NOTE — TELEPHONE ENCOUNTER
Home Care would likely not be covered by health insurance plan due to requirement of video or in person visit.      Could consider community paramedic order. Referral pended for your review.     Shazia Polanco RN Care Coordinator  [facility] Carla Maldonado [place]  Email: [email or web address]  Phone: [phone]

## 2022-06-27 NOTE — PROGRESS NOTES
"Community Paramedic Program  Community Health Worker Initial Outreach    Referral source: Pt's PCP & CC RN  Referral reason:   Reason(s) for visit: Initial Assessment  Home/Safety Assessment    Goals for visit(s): Clinic Appointment (face to face) Attendance     How often should patient be seen: Monthly     Preference on when patient should be seen: Within 2 Weeks       Comments   PCP: Manoj Daley  Other info that would be helpful:      Initial visit: Friday, July 1st / 2 pm / CP Josué (date/time/CP)      Additional information:     Called and spoke with pt. Confirmed referral information and noted that her PCP and clinic have been attempting to get home care for pt without success. Described the CP program for pt and gave her examples of what to expect during an initial visit. Pt confirmed that she has edema in both legs \"but my left leg is worse than the other.\" She agreed to schedule an initial visit and declined to take down my or the CP's contact information.     Confirmed pt's address. Pt lives in the [facility] mobile home park in [place]. Pt said the CP can \"park on the street outside of our trailer, [id number] Pt said she lives alone.     Pt requested that the CP call her when he's on the way to her mobile home at [phone]. I agreed to pass the request along to the CP.           "

## 2022-07-05 NOTE — PROGRESS NOTES
Community Paramedic Program  Community Health Worker Outreach    UTC/Voicemail    Outreach attempted x 1.      Left message on patient's voicemail with call back information and requested return call.    CHW Follow-up Plan: will try to reach patient again in 1-2 business days.    Note: Rescheduling initial CP visit. Available on 7/11 at 2:30 pm with CP3?

## 2022-07-06 NOTE — PROGRESS NOTES
Community Paramedic Program  Community Health Worker Outreach    UTC/Voicemail    Outreach attempted x 2.      Left message on patient's voicemail with call back information and requested return call.    CHW Follow-up Plan: will try to reach patient again in 1-2 business days.    Note: Available 7/12 with CP3 pm?    Referral source: Pt's PCP  Referral reason:   Reason(s) for visit: Initial Assessment  Home/Safety Assessment    Goals for visit(s): Clinic Appointment (face to face) Attendance     How often should patient be seen: Monthly     Preference on when patient should be seen: Within 2 Weeks       Comments   PCP: Manoj Daley  Other info that would be helpful:

## 2022-07-07 NOTE — PROGRESS NOTES
Community Paramedic Program  Community Health Worker Outreach    UTC/Voicemail    Outreach attempted x 3.      Left message on patient's voicemail with call back information and requested return call.    CHW Follow-up Plan: will do no further outreaches at this time.

## 2022-07-13 PROBLEM — I63.9 CVA (CEREBRAL VASCULAR ACCIDENT) (H): Status: ACTIVE | Noted: 2022-01-01

## 2022-07-13 NOTE — H&P
Medical ICU History and Physical    Name: Cristy Bailey MRN: [id number]     Age: 77 year old   YOB: 1945     Date of Admisson: 7/13/2022            HPI:   CC:     History obtained from EMR    Cristy Bailey is a 77 year old female with HO super morbid obesity and hyperlipidemia presented with complete left-sided paralysis, left-sided facial droop, and difficulty speaking admitted 7/13/2022 for right MCA occlusion.  She was treated with tenecteplase and mechanical thrombectomy, then transferred to the ICU intubated.           Past Medical History:     Past Medical History:   Diagnosis Date     Arthritis     hip     HTN (hypertension) 1/2010      Leg weakness 3/24/2015     Lyme disease 1980'S AND 2004    lYME DZ LIKE SXS RESPONDED TO ABX             Past Surgical History:      Past Surgical History:   Procedure Laterality Date     ARTHROPLASTY HIP Right 7/30/2018    Procedure: ARTHROPLASTY HIP;  Right total hip arthroplasty;  Surgeon: Bry Garcia MD;  Location: [facility] OR     ARTHROPLASTY HIP Left 2/12/2019    Procedure: Left total hip arthroplasty (Akbar and Nephew 60 mm acetabulum with 2 screws; #15 standard neck Synergy stem; +0 neck 36 mm head) (extra difficult case because of her high BMI and deep subcutaneous fatty layer resulting in the operative time at least twice as long from typical operative time);  Surgeon: Chavo Rodriguez MD;  Location:  OR     BIOPSY OF UTERUS LINING  3/2010    negative.      COLONOSCOPY  2/21/10    benign findings. advised 10 yr f/u.      INCISION AND DRAINAGE HIP, COMBINED Right 8/29/2018    Procedure: COMBINED INCISION AND DRAINAGE HIP;  Incision and debridement, right hip ;  Surgeon: Bry Garcia MD;  Location: [facility] OR     ORTHOPEDIC SURGERY Right 08/29/2018    Right hip I & D 8/26/18, Right JENAE, DOS 7/30/2018, Dr. Garcia. [facility]     ZZC C-SEC ONLY,PREV C-SEC      c-sec x 3              Social History:     Social  History     Socioeconomic History     Marital status:      Spouse name: Not on file     Number of children: 3     Years of education: Not on file     Highest education level: Not on file   Occupational History     Occupation: computer repair     Employer: [employer]     Comment: [employer]   Tobacco Use     Smoking status: Former Smoker     Quit date: 1984     Years since quittin.9     Smokeless tobacco: Never Used   Substance and Sexual Activity     Alcohol use: No     Drug use: No     Sexual activity: Not Currently     Partners: Male   Other Topics Concern     Parent/sibling w/ CABG, MI or angioplasty before 65F 55M? Yes   Social History Narrative     Not on file     Social Determinants of Health     Financial Resource Strain: Not on file   Food Insecurity: Not on file   Transportation Needs: Not on file   Physical Activity: Not on file   Stress: Not on file   Social Connections: Not on file   Intimate Partner Violence: Not on file   Housing Stability: Not on file              Family History:     Family History   Problem Relation Age of Onset     Cerebrovascular Disease Father         -stroke at age 80     Family History Negative Mother          Diedn her 80's of unknown causes.  Pt otherwise unaware of her health hx.      Unknown/Adopted Mother      Diabetes Brother         (Christian)  of DM complications at age 50.     Alcohol/Drug Brother         Christian     [email or web address]. Brother         Christian.      Heart Disease Brother         Christian--MI age 50.     Family History Negative Brother      Family History Negative Brother      Family History Negative Sister      Family History Negative Sister      Family History Negative Son      Family History Negative Daughter      Family History Negative Daughter              Immunizations:     Immunization History   Administered Date(s) Administered     Influenza (High Dose) 3 valent vaccine 2012, 10/23/2015, 2017      Influenza (IIV3) PF 10/11/2008, 09/25/2014     TDAP Vaccine (Boostrix) 03/08/2010             Allergies:     Allergies   Allergen Reactions     No Known Drug Allergies              Medications:   [COMPLETED] 0.9% sodium chloride BOLUS  [COMPLETED] CT Scan Flush  [COMPLETED] etomidate (AMIDATE) injection 30 mg  [COMPLETED] iopamidol (ISOVUE-370) solution 500 mL  [COMPLETED] LORazepam (ATIVAN) injection 1 mg  [COMPLETED] LORazepam (ATIVAN) injection 1 mg  [COMPLETED] midazolam (VERSED) injection 2 mg  [COMPLETED] ondansetron (ZOFRAN) 2 MG/ML injection  [COMPLETED] succinylcholine (ANECTINE) injection 200 mg  [COMPLETED] tenecteplase (TNKase) injection 25 mg    albuterol (PROVENTIL) (2.5 MG/3ML) 0.083% neb solution, Take 1 vial (2.5 mg) by nebulization every 6 hours as needed for shortness of breath / dyspnea or wheezing (Patient not taking: Reported on 3/17/2022)  clotrimazole (LOTRIMIN) 1 % external cream, Apply topically 2 times daily  valACYclovir (VALTREX) 1000 mg tablet, Take 1 tablet (1,000 mg) by mouth 3 times daily for 7 days             Review of Systems:     Review of systems is not obtainable due to patient factors - intubation    ROS         Physical Exam:     /80   Pulse 75   Temp (!) 95.7  F (35.4  C) (Axillary)   Resp 16   SpO2 96%     FiO2 (%): 60 %  Resp: 16      Physical Exam  Vitals and nursing note reviewed.   Constitutional:       Appearance: She is obese.   Cardiovascular:      Rate and Rhythm: Normal rate and regular rhythm.   Pulmonary:      Effort: Pulmonary effort is normal.      Breath sounds: Normal breath sounds. No wheezing, rhonchi or rales.      Comments: On ventilator  Musculoskeletal:      Right lower leg: Edema present.      Left lower leg: Edema present.   Skin:     General: Skin is warm and dry.   Neurological:      Mental Status: She is alert.      Comments: Sedated, intubated                  Data:   ROUTINE ICU LABS (Last four results)  CMP  Recent Labs   Lab  07/13/22  1208 07/13/22  1204     --    POTASSIUM 3.9  --    CHLORIDE 107  --    CO2 27  --    ANIONGAP 5  --    * 124*   BUN 21  --    CR 0.67  --    GFRESTIMATED >90  --    ASHIA 8.6  --      CBC  Recent Labs   Lab 07/13/22  1208   WBC 8.7   RBC 4.66   HGB 13.5   HCT 43.4   MCV 93   MCH 29.0   MCHC 31.1*   RDW 14.5        INR  Recent Labs   Lab 07/13/22  1208   INR 1.04     Arterial Blood GasNo lab results found in last 7 days.         Imaging:     CT head 7/13/2022  1.  Early ischemia right insular cortex and right temporal lobe consistent with right middle cerebral artery territory.  2.  No evidence of midline shift or hemorrhage.  3.  Stable diffuse age related changes.    Chest x-ray 7/13/2022  IMPRESSION: Cardiomediastinal silhouette is mildly enlarged. Placement  of endotracheal tube with tip approximately 4 cm above the elbert.  Nasogastric tube courses below the diaphragm, tip beyond the field of  view. There is pulmonary vascular congestion and likely moderate  pulmonary edema. No definite pleural effusion or pneumothorax. Left  inferior lung is not well visualized to overlying hyperdense  structure, likely external to the patient. No acute bony abnormality.               Assessment and Plan:       Neuro/psych:   -Sedation: Propofol    ## Right MCA M1 occlusion  Status post tenecteplase and mechanical thrombectomy 7/13.  -Goal SBP less than 180 DBP less than 105  -No antiplatelet or anticoagulation for 24 hours  -MRI with and without contrast ordered  -Neurosurgery consulted, appreciate their assistance  -Neuro critical care consulted, appreciate their assistance      Pulmonary:   ## Acute hypoxic respiratory failure  Inability to protect airway secondary to stroke.  -Continue mechanical ventilation, daily weaning trials      Cardiac:  ## Blood pressure management  Goal SBP less than 180, DBP less than 105  -As needed hydralazine and labetalol ordered      Renal:   No active  issues      Infectious Disease:   No active issues      GI/:   -Nutrition: N.p.o.    ## Super morbid obesity  Requiring additional staff and specialized equipment      Endocrine:   No active issues      Heme:   No active concerns    Skin:   ## Lip laceration  Lip appears to have been lacerated during intubation      Prophylaxis:    -GI: PPI   -DVT: Mechanical.  No pharmacologic prophylaxis until 24 hours after tenecteplase        CODE: Full code    Dispo: Remain in the ICU for now      CCT 50 min excluding procedures    Diony Garcia M.D.  Pulmonary & Critical Care  Pager: Click Here to page

## 2022-07-13 NOTE — ED NOTES
Bed: ED03  Expected date: 7/13/22  Expected time: 11:51 AM  Means of arrival: Ambulance  Comments:  ems

## 2022-07-13 NOTE — PHARMACY-ADMISSION MEDICATION HISTORY
Pharmacy Medication History  Admission medication history interview status for the 7/13/2022  admission is complete. See EPIC admission navigator for prior to admission medications     Location of Interview: Phone  Medication history sources: Patient's family/friend (significant other Leo) and Surescripts    Significant changes made to the medication list:  -Removed Valtrex - 7 day course in March 2022  -Added acetaminophen which she occasionally takes      Additional medication history information:   -Leo reports that she takes no regular medications.    Medication reconciliation completed by provider prior to medication history? No    Time spent in this activity: 10 min    Prior to Admission medications    Medication Sig Last Dose Taking? Auth Provider Long Term End Date   ACETAMINOPHEN PO Take by mouth every 8 hours as needed for pain Past Week at prn Yes Unknown, Entered By History     clotrimazole (LOTRIMIN) 1 % external cream Apply topically 2 times daily Unknown at Unknown time Yes Manoj Daley PA-C     albuterol (PROVENTIL) (2.5 MG/3ML) 0.083% neb solution Take 1 vial (2.5 mg) by nebulization every 6 hours as needed for shortness of breath / dyspnea or wheezing  Patient not taking: Reported on 7/13/2022 Not Taking at Unknown time  Sandra Skinner Ra, APRN CNP Yes        The information provided in this note is only as accurate as the sources available at the time of update(s)

## 2022-07-13 NOTE — PROGRESS NOTES
Patient was intubated by provider with 7.0 ETT secured at 24 at the lip.  Provider verified ETT placement on CXR.  ETT was secured with commercial device with bite block included.  BALAJI terry.    Pat Francis, RT

## 2022-07-13 NOTE — ED PROVIDER NOTES
"  History   Chief Complaint:  Stroke Symptoms     The history is provided by the EMS personnel and the patient.   History limited due to condition of patient      Cristy Bailey is a 77 year old female with history of hyperlipidemia who presents with stroke symptoms. EMS reports that the patient presents with complete left sided paralysis, left sided facial droop, and difficulty speaking. EMS states that these symptoms developed at 1115 while patient was sitting in her chair at home.  Story was corroborated by  Leo over the phone.  The patient complains of back pain and states that \"I feel pain everywhere.\"   reports that she has chronic back pain.  She is normally pretty sedentary and only stands to transfer with the assist of walker.  Prior to 1115 she was at baseline. Blood sugar was 97 en route.  She takes Tylenol every so often but no other medications per .    Review of Systems   Musculoskeletal: Positive for back pain.   Neurological: Positive for facial asymmetry and speech difficulty.   All other systems reviewed and are negative.    Allergies:  No known allergies    Medications:  The patient is not currently taking any prescribed medications.    Past Medical History:     Hyperlipidemia  Morbid obesity  Myalgia and myositis    Past Surgical History:    Bilateral hip arthroplasty  Colonoscopy    Family History:    Father: cerebrovascular disease  Brother: Diabetes, substance abuse, CAD, heart disease.    Social History:  Patient presents to ED alone.   Patient arrived via EMS.  Lives with significant other, Leo.     Physical Exam     Patient Vitals for the past 24 hrs:   BP Temp Temp src Pulse Resp SpO2 Weight   07/13/22 1420 -- -- -- 89 10 100 % --   07/13/22 1415 (!) 145/96 -- -- 82 16 100 % --   07/13/22 1410 (!) 152/116 -- -- 96 15 98 % --   07/13/22 1405 135/85 -- -- 85 14 100 % --   07/13/22 1355 (!) 112/97 -- -- 76 19 98 % --   07/13/22 1350 124/77 -- -- 82 15 100 % -- "   07/13/22 1345 (!) 131/99 -- -- 81 13 99 % --   07/13/22 1340 -- -- -- 81 10 98 % --   07/13/22 1335 -- -- -- 84 13 100 % --   07/13/22 1330 115/76 -- -- 80 14 98 % --   07/13/22 1315 (!) 102/90 -- -- 82 13 97 % --   07/13/22 1311 125/86 -- -- -- -- -- --   07/13/22 1300 (!) 114/93 -- -- 89 -- 96 % --   07/13/22 1245 (!) 145/84 -- -- 85 -- 96 % --   07/13/22 1236 -- 97.7  F (36.5  C) Temporal -- -- -- --   07/13/22 1200 125/80 -- -- 83 18 93 % (!) 175.9 kg (387 lb 12.8 oz)       Physical Exam  VS: Reviewed per above  HENT: Left lower facial weakness, right gaze preference.  EYES: sclera anicteric  CV: Rate as noted, regular rhythm.   RESP: Effort normal. Breath sounds are normal bilaterally.  GI: no tenderness/rebound/guarding, not distended.  NEURO: GCS 14, left arm is fully paretic without sensation to painful stimuli.  Able to wiggle bilateral toes but further strength testing is limited due to patient participation versus baseline deconditioning.  Sensation intact to painful stimuli in the bilateral lower extremities.  Patient is freely moving the right arm with intact sensation to light touch/pain.  MSK: No deformity of the extremities  SKIN: Warm and dry    Emergency Department Course   ECG  ECG taken at 1256, ECG read at 1256  Sinus rhythm with 1st degree AV block   No significant change as compared to prior, dated 11/6/2021.  Rate 81 bpm. SC interval 210 ms. QRS duration 100 ms. QT/QTc 426/494 ms. P-R-T axes 49 5 6.     Imaging:  XR Chest Port 1 View   Final Result   IMPRESSION: Cardiomediastinal silhouette is mildly enlarged. Placement   of endotracheal tube with tip approximately 4 cm above the elbert.   Nasogastric tube courses below the diaphragm, tip beyond the field of   view. There is pulmonary vascular congestion and likely moderate   pulmonary edema. No definite pleural effusion or pneumothorax. Left   inferior lung is not well visualized to overlying hyperdense   structure, likely external to the  patient. No acute bony abnormality.      SHARONA VELAZQUEZ MD            SYSTEM ID:  [id number]      CTA Head Neck w Contrast   Final Result   IMPRESSION:      CTA HEAD:   1. Right carotid terminus occlusion. Right middle cerebral artery M1   segment occlusion with poor opacification of the more distal right MCA   branches/poor collateral flow.   2. Nonopacification of the A1 segment of the right anterior cerebral   artery, with reconstitution of flow into the right [id number] A1,   A2, and more distal right anterior cerebral artery branches likely due   to collateral flow across a patent anterior communicating artery.   3. Otherwise, no significant stenosis or occlusion of the major   intracranial arteries.      CTA NECK:   1. No significant stenosis or occlusion of the cervical carotid or   vertebral arteries.   2. Multiple thyroid nodules, some of which measure 1.5 cm or greater   in size. Recommend follow-up thyroid ultrasound.      Findings from the noncontrast head CT and CTA head and neck discussed   by myself by phone with Dr. Russo at approximately 12:50 PM on   7/13/2022.        NAHOMY MAST MD            SYSTEM ID:  [id number]      CT Head w/o Contrast   Final Result   IMPRESSION:   1. Findings concerning for evolving acute right middle cerebral artery   territory infarct, as described.   2. No acute intracranial hemorrhage or significant mass   effect/herniation.   3. Calcified extra-axial mass overlying the left parietal region   measuring 1.4 cm, potentially representing a meningioma.   4. Brain atrophy and presumed chronic small vessel ischemic change, as   described.      Findings from noncontrast head CT and CTA head and neck discussed with   Dr. Russo by myself at approximately 12:50 PM on 7/13/2022.      NAHOMY MAST MD            SYSTEM ID:  [id number]        Report per radiology    Laboratory:  Labs Ordered and Resulted from Time of ED Arrival to Time of ED Departure   BASIC  METABOLIC PANEL - Abnormal       Result Value    Sodium 139      Potassium 3.9      Chloride 107      Carbon Dioxide (CO2) 27      Anion Gap 5      Urea Nitrogen 21      Creatinine 0.67      Calcium 8.6      Glucose 110 (*)     GFR Estimate >90     CBC WITH PLATELETS AND DIFFERENTIAL - Abnormal    WBC Count 8.7      RBC Count 4.66      Hemoglobin 13.5      Hematocrit 43.4      MCV 93      MCH 29.0      MCHC 31.1 (*)     RDW 14.5      Platelet Count 279      % Neutrophils 64      % Lymphocytes 20      % Monocytes 9      % Eosinophils 5      % Basophils 1      % Immature Granulocytes 1      NRBCs per 100 WBC 0      Absolute Neutrophils 5.8      Absolute Lymphocytes 1.7      Absolute Monocytes 0.8      Absolute Eosinophils 0.4      Absolute Basophils 0.0      Absolute Immature Granulocytes 0.0      Absolute NRBCs 0.0     GLUCOSE BY METER - Abnormal    GLUCOSE BY METER POCT 124 (*)    INR - Normal    INR 1.04     PARTIAL THROMBOPLASTIN TIME - Normal    aPTT 25     TROPONIN I - Normal    Troponin I High Sensitivity 5     GLUCOSE MONITOR NURSING POCT   COVID-19 VIRUS (CORONAVIRUS) BY PCR        [facility]    -Intubation    Date/Time: 7/13/2022 1:51 PM  Performed by: Sven Russo MD  Authorized by: Sven Russo MD     Risks, benefits and alternatives discussed.    ED EVALUATION:      Assessment Time: 7/13/2022 1:51 PM      I have performed an Emergency Department Evaluation including taking a history and physical examination, this evaluation will be documented in the electronic medical record for this ED encounter.      ASA Class: Class 4- Severe systemic disease, acute unstable problems    Mallampati: Grade 4- soft palate obscured by base of tongue    NPO Status: not NPO, emergent situation    UNIVERSAL PROTOCOL   Site Marked: NA  Prior Images Obtained and Reviewed:  NA  Required items: Required blood products, implants, devices and special equipment available    Patient identity  confirmed:  Arm band and provided demographic data  Patient was reevaluated immediately before administering moderate or deep sedation or anesthesia  Confirmation Checklist:  Patient's identity using two indicators, relevant allergies, procedure was appropriate and matched the consent or emergent situation and correct equipment/implants were available  Time out: Immediately prior to the procedure a time out was called    Universal Protocol: the Joint Commission Universal Protocol was followed      PRE-PROCEDURE DETAILS     Patient status:  Altered mental status    Pretreatment medications:  None    Paralytics:  Succinylcholine        SEDATION  Patient Sedated: Yes    Sedation Type:  Deep  Sedation:  Etomidate  Vital signs: Vital signs monitored during sedation    PROCEDURE DETAILS     Preoxygenation:  Nasal cannula (facemask, nasal cannula )    Intubation method:  Oral    Oral intubation technique:  Video-assisted    Laryngoscope blade:  Mac 3    Tube size (mm):  7.0    Tube type:  Cuffed    Number of attempts:  2    Cricoid pressure: yes      Tube visualized through cords: yes      PLACEMENT ASSESSMENT     ETT to teeth:  23    Tube secured with:  ETT irizarry    Breath sounds:  Equal and absent over the epigastrium    Placement verification: chest rise, CXR verification, direct visualization, equal breath sounds, ETCO2 detector and tube exhalation      CXR findings:  ETT in proper place    PROCEDURE    Patient Tolerance:  Patient tolerated the procedure well with no immediate complications  Length of time physician/provider present for 1:1 monitoring during sedation: 15    Emergency Department Course:     Reviewed:  I reviewed nursing notes, vitals, past medical history and Care Everywhere    Assessments:  1155 I obtained history and examined the patient as noted above.   1156 I called a tier 1 stroke code.  1210 I spoke with Leo (patient's significant other), regarding the patient's care.  1258 I rechecked the  patient and explained findings.   1347 I spoke to Leo keri, who consented to intubation.  1355 I intubated the patient.    Consults:  1202 I spoke with Ximena Hinton PA-C, of stroke/neurology.  1238 I spoke with Ximena Hinton PA-C, of stroke/neurology.  1250 I spoke to the radiologist regarding CT scans.  1309 I spoke with Dr. Ortega, neurology, of stroke/neurology.  1309 I spoke with Kaylin Gusman, [facility] ED physician, who could not accept transfer of care.  1320 I spoke to Dr. Robison, [facility] ED, who accepts this patient.     Interventions:  1210 Ativan 1mg IV  1210 Zofran 4mg IM  1227 Ativan 1mg IV  1311 TNKase 25mg IV  1406 Succinylcholine 200mg IV   Etomidate 30mg IV  1406 NS 1L IV  1416 Diprivan 20mcg/kg/min IV  1426 Versed 2mg IV    Disposition:  The patient was transferred to [facility] ED via EMS. Dr. Robison accepted the patient for transfer.     Impression & Plan     CMS Diagnoses: The patient has stroke symptoms:         ED Stroke specific documentation           NIHSS PDF     Patient last known well time: Prior to 1115  ED Provider first to bedside at: 1155  CT Results received at: 1250    Thrombolytics:   Risks (including potential for bleeding and death), benefits, and alternatives to thrombolytic therapy were discussed with Patient. Tenecteplase (TNK) administered without delay.    If treating with thrombolytics: Ensure SBP<180 and DBP<105 prior to treatment with thrombolytics.  Administering thrombolytics after treatment with IV labetalol, hydralazine, or nicardipine is reasonable once BP control is established.    Endovascular Retrieval:  Endovascular treatment initiated for right M1 occlusion    [employer] Stroke Scale (Baseline)  Time Performed: 1155     Score    Level of consciousness: (0)   Alert, keenly responsive    LOC questions: (0)   Answers both questions correctly    LOC commands: (0)   Performs both tasks correctly    Best gaze: (2)   Forced deviation    Visual: (2)   Complete  hemianopia    Facial palsy: (2)   Partial paralysis (total/near total of lower face)    Motor arm (left): (4)   No movement    Motor arm (right): (0)   No drift    Motor leg (left): (3)   No effort against gravity    Motor leg (right): (3)   No effort against gravity    Limb ataxia: (0)   Absent    Sensory: (1)   Mild to moderate sensory loss    Best language: (1)   Mild to moderate aphasia    Dysarthria: (1)   Mild to moderate dysarthria    Extinction and inattention: (2)   Profound nura-inattention / extinction > one modality        Total Score:  21        Stroke Mimics were considered (including migraine headache, seizure disorder, hypoglycemia (or hyperglycemia), head or spinal trauma, CNS infection, Toxin ingestion and shock state (e.g. sepsis) .    Evaluation/Treatement was delayed due to: profound agitation requiring sedative for CT       Medical Decision Making:  Patient presents to the ER for evaluation of left arm and facial weakness and confusion and abnormal speech that onset this morning.  On arrival vital signs are reassuring.  Exam confirms above concerns.  Tier 1 stroke code initiated.  Patient required Ativan IV, 2 mg number to facilitate CT imaging.  CT scans were concerning for right M1 occlusion.  Stroke neurology consented  for tPA and plan for neuro intervention for likely thrombectomy.  Multiple phone calls were placed in order to facilitate accepting facility.  Just prior to transfer, neuro IR team requested intubation prior to transfer in order to facilitate procedure.  Verbal consent obtained from  over the phone. intubation was performed per procedure note above.  At time of transfer, patient remained hemodynamically stable on the ventilator.    Critical Care Time: was 60 minutes for this patient excluding procedures    Diagnosis:    ICD-10-CM    1. Cerebrovascular accident (CVA), unspecified mechanism (H)  I63.9    2. Cerebral artery occlusion  I66.9      Scribe  Disclosure:  I, Nishant Katz & Aba Francis, am serving as a scribe at 11:59 AM on 7/13/2022 to document services personally performed by Sven Russo MD based on my observations and the provider's statements to me.          Sven Russo MD  07/13/22 [id number]

## 2022-07-13 NOTE — ED TRIAGE NOTES
At 1115, pt became unable to speak and noted to have left facial droop and left sided paralysis by family member.

## 2022-07-13 NOTE — IR NOTE
Interventional Radiology Intra-procedural Nursing Note    Patient Name: Cristy Bailey  Medical Record Number: [id number]  Today's Date: July 13, 2022    Start Time: 1527  End of procedure time: 1617  Procedure: cerebral angiogram, stroke thrombectomy  Report given to: JEEVAN Mosley IR , ICU RN flyers   Time pt departs:  1645  : n/a    Other Notes: Patient arrives to [facility] from [facility] via EMS, intubated on vent, sedated on propofol at 40mcg/kg/min. Abrasion is noted on left lower lip, EMS reports from intubation. VSS on arrival. Report received from EMS. RT in room.  Unable to illicit complete neuro assessment due to sedation.    Identification confirmed and consent verified. Patient was then assisted onto procedure table, positioned safely and connected to monitoring equipment.     Bilateral groins were prepped, right wrist prepped for possible radial approach, patient was then draped under sterile technique.     1527  Time out  1533  6f right radial arterial sheath   1553  Clot Identification  1600  1st pass  1617  Reperfusion  1625 right radial sheath out, TR band in place, 11 ml air    Yajaira Nuno RN on 7/13/2022 at 4:29 PM

## 2022-07-13 NOTE — PROCEDURES
[facility]     Endovascular Surgical Neuroradiology Post-Procedure Note    Pre-Procedure Diagnosis: Right MCA M1 occlusion status post TNKase  Post-Procedure Diagnosis: Right MCA M1 occlusion status post TNKase status post mechanical thrombectomy 3 passes with TICI 3 recanalization    Procedure(s):   Endovascular treatment of acute ischemic stroke    - TICI Score: 3    Findings:    -Right MCA M1 occlusion status post 3 passes resulting in TICI 3 recanalization    Plan:    -Stroke work-up per stroke team  -TR band on the right radial artery access site can be removed per protocol over 2 hours    Thrombectomy Time Metrics    Location of Clot: Right MCA M1  ESN Team Activation Time: 12:56 PM  Angio Suite Patient Arrival Time: 1510  Groin Puncture Time: 1530  Time to Clot: 1553  Recanalization time: 1617  Number of Passes: 3  TICI Score: 3  Delays in the process: There was a delay in transfer of patient as patient was unstable, severely agitated and required intubation prior to transfer.    Primary Surgeon:  Dr. Ramu Jaramillo  Secondary Surgeon:  Dr. Ramu Jaramillo  Secondary Surgeon Review:  None  Fellow:  Dr. Bonilla, Dr. Ortega  Additional Assistants:      Prior to the start of the procedure and with procedural staff participation, I verbally confirmed: the patient s identity using two indicators, relevant allergies, that the procedure was appropriate and matched the consent or emergent situation, and that the correct equipment/implants were available. Immediately prior to starting the procedure I conducted the Time Out with the procedural staff and re-confirmed the patient s name, procedure, and site/side. (The Joint Commission universal protocol was followed.)  Yes    PRU value: Not applicable    Anesthesia: Propofol infusion as the patient was intubated  Medications: Radial artery cocktail Heparin 3000 units, verapamil 2.5 mg, nitroglycerin 200 mcg  Puncture site:  Right Radial  Artery    Fluoroscopy time (minutes): 90  Radiation dose (mGy): 1849.44  Contrast amount (mL): 48 cc    Estimated blood loss (mL):  20 ml     Closure:  Manual TR band applied    Disposition:  Will be followed in hospital by the Neuro Critical Care/Stroke team.        Sedation Post-Procedure Summary    Sedatives: Propofol used during the procedure as patient was already intubated    Vital signs and pulse oximetry were monitored and remained stable throughout the procedure, and sedation was maintained until the procedure was complete.  The patient was monitored by staff until sedation discharge criteria were met.    Patient tolerance:  Patient tolerated the procedure well with no immediate complications.    Time of sedation in minutes:  75 minutes from beginning to end of physician one to one monitoring.    Robinson Ortega MD, MPH  Endovascular Surgical Neuroradiology Fellow  [facility]  Pager: [phone]

## 2022-07-13 NOTE — PROGRESS NOTES
[facility]     Endovascular Surgical Neuroradiology Pre-Procedure Note      HPI:  Cristy Bailey is a 77 year old female with past medical history of hypertension, hyperlipidemia, morbid obesity presenting to the [facility] ED with sudden onset left-sided weakness.  CT CTA was performed and found to have a right MCA syndrome with occlusion and proximal right M1.  Endovascular team was consulted for emergent stroke thrombectomy.  Patient also received TNKase.    Patient was severely agitated and required a lot of sedation to get the CT head done.  There was also concern for airway protection in transit and therefore a decision was made by the stroke team to proceed with intubation prior to transfer.  Patient will go directly to IR suite on arrival to [facility].    Medical History:  Past Medical History:   Diagnosis Date     Arthritis     hip     HTN (hypertension) 1/2010      Leg weakness 3/24/2015     Lyme disease 1980'S AND 2004    lYME DZ LIKE SXS RESPONDED TO ABX       Surgical History:  Past Surgical History:   Procedure Laterality Date     ARTHROPLASTY HIP Right 7/30/2018    Procedure: ARTHROPLASTY HIP;  Right total hip arthroplasty;  Surgeon: Bry Garcia MD;  Location:  OR     ARTHROPLASTY HIP Left 2/12/2019    Procedure: Left total hip arthroplasty (Akbar [employer] Nephew 60 mm acetabulum with 2 screws; #15 standard neck Synergy stem; +0 neck 36 mm head) (extra difficult case because of her high BMI and deep subcutaneous fatty layer resulting in the operative time at least twice as long from typical operative time);  Surgeon: Chavo Rodriguez MD;  Location: [facility] OR     BIOPSY OF UTERUS LINING  3/2010    negative.      COLONOSCOPY  2/21/10    benign findings. advised 10 yr f/u.      INCISION AND DRAINAGE HIP, COMBINED Right 8/29/2018    Procedure: COMBINED INCISION AND DRAINAGE HIP;  Incision and debridement, right hip ;  Surgeon: Bry Garcia MD;  Location:  OR      ORTHOPEDIC SURGERY Right 2018    Right hip I & D 18, Right JENAE, DOS 2018, Dr. Garcia. [facility]     ZZC C-SEC ONLY,PREV C-SEC      c-sec x 3        Family History:  Family History   Problem Relation Age of Onset     Cerebrovascular Disease Father         -stroke at age 80     Family History Negative Mother          Diedn her 80's of unknown causes.  Pt otherwise unaware of her health hx.      Unknown/Adopted Mother      Diabetes Brother         (Christian)  of DM complications at age 50.     Alcohol/Drug Brother         Christian     C.A.D. Brother         Christian.      Heart Disease Brother         Christian--MI age 50.     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 Yes   Social History Narrative     Not on file     Social Determinants of Health     Financial Resource Strain: Not on file   Food Insecurity: Not on file   Transportation Needs: Not on file   Physical Activity: Not on file   Stress: Not on file   Social Connections: Not on file   Intimate Partner Violence: Not on file   Housing  "Stability: Not on file       Allergies:  Allergies   Allergen Reactions     No Known Drug Allergies        Is there a contrast allergy?  No    Medications:  Current Facility-Administered Medications   Medication     labetalol (NORMODYNE/TRANDATE) injection 10 mg    Or     hydrALAZINE (APRESOLINE) injection 10 mg     niCARdipine 40 mg in 200 mL NS (CARDENE) infusion     propofol (DIPRIVAN) infusion     sodium chloride 0.9% infusion     Current Outpatient Medications   Medication Sig     albuterol (PROVENTIL) (2.5 MG/3ML) 0.083% neb solution Take 1 vial (2.5 mg) by nebulization every 6 hours as needed for shortness of breath / dyspnea or wheezing (Patient not taking: Reported on 3/17/2022)     clotrimazole (LOTRIMIN) 1 % external cream Apply topically 2 times daily     valACYclovir (VALTREX) 1000 mg tablet Take 1 tablet (1,000 mg) by mouth 3 times daily for 7 days   .    ROS:  The 10 point Review of Systems is negative other than noted in the HPI or here.    PHYSICAL EXAMINATION    Vital signs:  Temp: 97.7  F (36.5  C) Temp src: Temporal BP: (!) 152/116 Pulse: 82   Resp: 16 SpO2: 100 % O2 Device: Mechanical Ventilator Oxygen Delivery: 3 LPM   Weight: (!) 175.9 kg (387 lb 12.8 oz)  Estimated body mass index is 60.74 kg/m  as calculated from the following:    Height as of 11/6/21: 1.702 m (5' 7\").    Weight as of this encounter: 175.9 kg (387 lb 12.8 oz).    Pre-procedure National Institutes of Health Stroke Scale:     NIHSS  1a. Level of Consciousness 2-->Not alert, requires repeated stimulation to attend, or is obtunded and requires strong or painful stimulation to make movements (not stereotyped)   1b. LOC Questions 0-->Answers both questions correctly   1c. LOC Commands 0-->Performs both tasks correctly   2.   Best Gaze 2-->Forced deviation, or total gaze paresis not overcome by the oculocephalic maneuver   3.   Visual 2-->Complete hemianopia   4.   Facial Palsy 2-->Partial paralysis (total or near-total paralysis " of lower face)   5a. Motor Arm, Left 4-->No movement   5b. Motor Arm, Right 0-->No drift, limb holds 90 (or 45) degrees for full 10 secs   6a. Motor Leg, Left 3-->No effort against gravity, leg falls to bed immediately   6b. Motor Leg, right 3-->No effort against gravity, leg falls to bed immediately   7.   Limb Ataxia 0-->Absent   8.   Sensory 1-->Mild-to-moderate sensory loss, patient feels pinprick is less sharp or is dull on the affected side, or there is a loss of superficial pain with pinprick, but patient is aware of being touched   9.   Best Language 1-->Mild-to-moderate aphasia, some obvious loss of fluency or facility of comprehension, without significant limitation on ideas expressed or form of expression. Reduction of speech and/or comprehension, however, makes conversation. . . (see row details)   10. Dysarthria 1-->Mild-to-moderate dysarthria, patient slurs at least some words and, at worst, can be understood with some difficulty   11. Extinction and Inattention  2-->Profound nura-inattention/extinction more than 1 modality   Total 23 (07/13/22 1243)       LABS  (most recent Cr, BUN, GFR, PLT, INR, PTT within the past 7 days):  Recent Labs   Lab 07/13/22  1208   CR 0.67   BUN 21   GFRESTIMATED >90      INR 1.04   PTT 25       ASSESSMENT:    Patient is a 77-year-old female with significant cardiovascular risk factors presenting with an acute onset right MCA syndrome status post TNKase.  Patient is posted for an emergent thrombectomy of the right M1 clot.    PLAN:  -Stroke thrombectomy  -Patient to be managed by nursing during the procedure  -Right femoral access  -Use of a closure device    Robinson Ortega MD, MPH  Endovascular Surgical Neuroradiology Fellow  [facility]  Pager: [phone]

## 2022-07-13 NOTE — CONSULTS
"      [facility]    Stroke Consult Note    Reason for Consult: Stroke Code     Chief Complaint: Stroke Symptoms      HPI  Cristy Bailey is a 77 year old female who was well until 10:30 am and then at 10:35 noted by her partner Leo who she lives with to be weak on the left and not making sense. They had a normal morning together and she was definitely talking and moving at her baseline until 10:30 am. In the ED she was very agitated and had to be sedated for CT. She had R gaze preference with L hemiparesis per ED but both legs weak.    Pt is unable to provide any history herself.    Leo reports she has no history of prior neurologic problems. She is wheelchair bound  and he helps with ADLs    Imaging Findings  CT head shows early ischemic changes in the R hemisphere and a R M1 occlusion.   (significant delay in imaging due to severe agitation requiring meds)    Intravenous Thrombolysis  Risks (including potential for bleeding and death), benefits, and alternatives to thrombolytic therapy were discussed with Family. Prior to tenecteplase (TNK) administration, the following issues were addressed:   - other eligibility reason: delay in getting history: unable to get any history from significant other Leo until 12:54 pm. Multiple numbers did not reach him and his main phone number he reports doesn't work well. Tried at least 6 times before reaching him finally at 12:54  - specific medical reason: (severe agitation leading to CT delay of at least 30 min)    Endovascular Treatment  Endovascular treatment initiated for R M1 occlusion    Impression   Acute ischemic stroke of R MCA territory due to undetermined etiology       Recommendations  - Use orderset: \"Ischemic Stroke Post-Thrombolytics/Thrombectomy ICU Admission\"  - Neurochecks and vital signs per post-thrombolytic orders and monitor closely for any evidence of CNS hemorrhage, bleeding, or orolingual angioedema  - Goal BP <180 / <105  - " "Hold all antithrombotic and anticoagulant medications for 24 hrs post-thrombolytic  - Hold pharmacologic VTE prophylaxis for 24 hrs post-thrombolytic  - Statin:  treat to LDL goal <70  - Repeat Head CT 24 hrs post-thrombolytic  - MRI Brain with and without contrast  - TTE (with Bubble Study if age 60 yrs or less)  - Telemetry, EKG  - Bedside Glucose Monitoring  - A1c, Lipid Panel, Troponin x 3  - PT/OT/SLP  - Stroke Education  - Stroke Class per [facility] ([facility])  - Euthermia, Euglycemia    Patient Follow-up    - final recommendation pending work-up    Thank you for this consult. We will continue to follow.     Mariya Whitten PA-C  Neurology  07/13/2022 12:05 PM  To page me or covering stroke neurology team member, click here: AMCOM   Choose \"On Call\" tab at top, then search dropdown box for \"Neurology Adult\", select location, press Enter, then look for stroke/neuro ICU/telestroke.    ______________________________________________________    Clinically Significant Risk Factors Present on Admission                     Past Medical History   Past Medical History:   Diagnosis Date     Arthritis     hip     HTN (hypertension) 1/2010      Leg weakness 3/24/2015     Lyme disease 1980'S AND 2004    lYME DZ LIKE SXS RESPONDED TO ABX     Past Surgical History   Past Surgical History:   Procedure Laterality Date     ARTHROPLASTY HIP Right 7/30/2018    Procedure: ARTHROPLASTY HIP;  Right total hip arthroplasty;  Surgeon: Bry Garcia MD;  Location:  OR     ARTHROPLASTY HIP Left 2/12/2019    Procedure: Left total hip arthroplasty (Akbar [employer] Nephew 60 mm acetabulum with 2 screws; #15 standard neck Synergy stem; +0 neck 36 mm head) (extra difficult case because of her high BMI and deep subcutaneous fatty layer resulting in the operative time at least twice as long from typical operative time);  Surgeon: Chavo Rodriguez MD;  Location: [facility] OR     BIOPSY OF UTERUS LINING  3/2010    negative.      " COLONOSCOPY  2/21/10    benign findings. advised 10 yr f/u.      INCISION AND DRAINAGE HIP, COMBINED Right 2018    Procedure: COMBINED INCISION AND DRAINAGE HIP;  Incision and debridement, right hip ;  Surgeon: Bry Garcia MD;  Location: [facility] OR     ORTHOPEDIC SURGERY Right 2018    Right hip I & D 18, Right JENAE, DOS 2018, Dr. Garcia. [facility]     ZZC C-SEC ONLY,PREV C-SEC      c-sec x 3      Medications   Home Meds  Prior to Admission medications    Medication Sig Start Date End Date Taking? Authorizing Provider   albuterol (PROVENTIL) (2.5 MG/3ML) 0.083% neb solution Take 1 vial (2.5 mg) by nebulization every 6 hours as needed for shortness of breath / dyspnea or wheezing  Patient not taking: Reported on 3/17/2022 1/25/21   Sandra Skinner Ra, APRN CNP   clotrimazole (LOTRIMIN) 1 % external cream Apply topically 2 times daily 22   Manoj Daley PA-C   valACYclovir (VALTREX) 1000 mg tablet Take 1 tablet (1,000 mg) by mouth 3 times daily for 7 days 3/17/22 3/24/22  Gabe Arana MD       Scheduled Meds    sodium chloride 0.9 %  100 mL Intravenous Once     iopamidol (ISOVUE-370)  500 mL Intravenous Once     LORazepam  1 mg Intravenous Once     ondansetron           Infusion Meds      PRN Meds      Allergies   Allergies   Allergen Reactions     No Known Drug Allergies      Family History   Family History   Problem Relation Age of Onset     Cerebrovascular Disease Father         -stroke at age 80     Family History Negative Mother          Diedn her 80's of unknown causes.  Pt otherwise unaware of her health hx.      Unknown/Adopted Mother      Diabetes Brother         (Christian)  of DM complications at age 50.     Alcohol/Drug Brother         Christian     C.A.D. Brother         Christian.      Heart Disease Brother         Christian--MI age 50.     Family History Negative Brother      Family History Negative Brother      Family History Negative  Sister      Family History Negative Sister      Family History Negative Son      Family History Negative Daughter      Family History Negative Daughter      Social History   Social History     Tobacco Use     Smoking status: Former Smoker     Quit date: 1984     Years since quittin.9     Smokeless tobacco: Never Used   Substance Use Topics     Alcohol use: No     Drug use: No       Review of Systems   Review of systems not obtained due to patient factors - confusion and critical condition       PHYSICAL EXAMINATION        General:  patient lying in bed very drowsy appearing  HEENT:  normocephalic/atraumatic  Pulmonary:  no respiratory distress    Neurologic  Mental Status:  Very drowsy, alerts only with physical stimulation, says own name, age and the month, follows only a couple other commands before falling asleep again  Cranial Nerves:  L VF cut, L facial droop, does not protrude tongue  Motor:  L arm no movement even to pain, R arm antigravity. Wiggles toes on both sides. Neither leg antigravity at the hip. R seems a little stronger  Reflexes:  unable to test (telestroke)  Sensory:  No withdraw to pain L arm or leg  Coordination:  Unable to test (too weak at bseline in legs), L arm too weak, keeps eyes closed so cannot test with R arm  Station/Gait:  unable to test due to telestroke      Dysphagia Screen  Per Nursing    Stroke Scales    NIHSS  1a. Level of Consciousness 2-->Not alert, requires repeated stimulation to attend, or is obtunded and requires strong or painful stimulation to make movements (not stereotyped)   1b. LOC Questions 0-->Answers both questions correctly   1c. LOC Commands 0-->Performs both tasks correctly   2.   Best Gaze 2-->Forced deviation, or total gaze paresis not overcome by the oculocephalic maneuver   3.   Visual 2-->Complete hemianopia   4.   Facial Palsy 2-->Partial paralysis (total or near-total paralysis of lower face)   5a. Motor Arm, Left 4-->No movement   5b. Motor  Arm, Right 0-->No drift, limb holds 90 (or 45) degrees for full 10 secs   6a. Motor Leg, Left 3-->No effort against gravity, leg falls to bed immediately   6b. Motor Leg, right 3-->No effort against gravity, leg falls to bed immediately   7.   Limb Ataxia 0-->Absent   8.   Sensory 1-->Mild-to-moderate sensory loss, patient feels pinprick is less sharp or is dull on the affected side, or there is a loss of superficial pain with pinprick, but patient is aware of being touched   9.   Best Language 1-->Mild-to-moderate aphasia, some obvious loss of fluency or facility of comprehension, without significant limitation on ideas expressed or form of expression. Reduction of speech and/or comprehension, however, makes conversation. . . (see row details)   10. Dysarthria 1-->Mild-to-moderate dysarthria, patient slurs at least some words and, at worst, can be understood with some difficulty   11. Extinction and Inattention  2-->Profound nura-inattention/extinction more than 1 modality   Total 23 (07/13/22 1243)       Modified Dixie Score (Pre-morbid)  4 - Moderately severe disability.  Unable to attend to own bodily needs without assistance or unable to walk unassisted.    Imaging  I personally reviewed all imaging; relevant findings per HPI.     Lab Results Data   CBC  No results for input(s): WBC, RBC, HGB, HCT, PLT in the last 168 hours.  Basic Metabolic Panel    No results for input(s): NA, POTASSIUM, CHLORIDE, CO2, BUN, CR, GLC, ASHIA in the last 168 hours.  Liver Panel  No results for input(s): PROTTOTAL, ALBUMIN, BILITOTAL, ALKPHOS, AST, ALT, BILIDIRECT in the last 168 hours.  INR    Recent Labs   Lab Test 09/04/19  0000 08/22/19  0925 08/07/19  0000   INR 3.4* 2.6* 2.9*      Lipid Profile  No lab results found.  A1C    Recent Labs   Lab Test 03/24/15  0827   A1C 5.9     Troponin I  No results for input(s): TROPONINIS, TROPONINI, GHTROP in the last 168 hours.       Stroke Code Data Data   Stroke Code Data  (for stroke  code with tele)  Stroke code activated 07/13/22   1157   First stroke provider response 07/13/22   1157   Video start time 07/13/22   1239   Video end time 07/13/22   1305   Last known normal 07/13/22   1030   Time of discovery  (or onset of symptoms)  07/13/22   1035   Head CT read by Stroke Neuro Dr/Provider 07/13/22   1235   Was stroke code de-escalated? No               Telestroke Service Details  Type of service telemedicine diagnostic assessment of acute neurological changes   Reason telemedicine is appropriate patient requires assessment with a specialist for diagnosis and treatment of neurological symptoms   Mode of transmission secure interactive audio and video communication per Valentin   Originating site (patient location) [facility]    Distant site (provider location) [facility]     Billing: I personally examined and evaluated the patient today. At the time of my evaluation and management the patient was critical condition today due to acute severe stroke. I personally managed thrombolysis discussion, transfer for thrombectomy. Key decisions made today included tenecteplase administration. I spent a total of 120 minutes providing critical care services, evaluating the patient, directing care and reviewing laboratory values and radiologic reports.

## 2022-07-13 NOTE — PLAN OF CARE
Patient came to the ICU around 1600. CT done. Patient remains intubated and sedated. Not following commands but withdraws in all extremities. PERRL. SR with stable blood pressure. FIO2 50% PEEP 8. Diminished lungs sounds throughout. Carrasquillo in place. Hypoactive bowel sounds. Low body temp, Sammie hugger in place. Patient's  updated via phone.

## 2022-07-14 NOTE — PROGRESS NOTES
Paged neurocrit regarding CT results and plan for the night; per neurocc-pt will have another head CT tomorrow AM, okay to keep sedated and intubated overnight; SBP goal remains <180

## 2022-07-14 NOTE — PROGRESS NOTES
ICU Note:    Assessed this patient for extubation after discussing her imaging with the Neurology service. Placed on 5/5. RSBI marginal so checked blood gas and pH wnl. Anxious but able to follow commands. Will proceed with extubation.     Additional CC time: 10 minutes    Elin Yan MD

## 2022-07-14 NOTE — PLAN OF CARE
Shift: [id number]      Neuro: Pupils are equal and reactive; pt moves all extremities, left side is slightly weaker than right side; slight left facial droop; pt is oriented to self and place and able to make her needs known    CV: Heart rhythm is sinus/sinus levi, SBP within limits (<180); afebrile     Resp: Lungs are diminished, pt extubated @1725 to 4L nasal cannula    GI: Pt is obese, no BM this shift    : Carrasquillo in place, adequate output; urine output is deborah is color    Skin: See skin assessment in flowsheet     Mobility/Activity: Turning and repositioning in the bed     Pain: Nothing for pain, pt appears comfortable and has denied pain     Family Updated: Pt's SO, Leo, and daughter, Kaylynn, updated over the phone this afternoon by RN

## 2022-07-14 NOTE — PROGRESS NOTES
[facility] ICU RESPIRATORY NOTE           Date of Admission: 07/13/2022     Date of Intubation (most recent): 07/13/2022    Reason for Mechanical Ventilation: AW protection      Number of Days on Mechanical Ventilation: 1     Met Criteria for Spontaneous Breathing Trial: No     Significant event today :None     ABG Results:   Recent Labs   Lab 07/13/22  2314   O2PER 40       Current Vent Settings:  Vent Mode: CMV/AC  (Continuous Mandatory Ventilation/ Assist Control)  FiO2 (%): 40 %  Resp Rate (Set): 16 breaths/min  Tidal Volume (Set, mL): 450 mL  PEEP (cm H2O): 8 cmH2O  Resp: 15     Skin Assessment :swollen tongue and lip injury      Plan: Continue full ventilatory support and wean as tolerated.     Tima Morocho, RT

## 2022-07-14 NOTE — PLAN OF CARE
Shift Summary:  Pt cool at start of shift, about . Warmed using bear hugger + heat humidification by vent. VBG done. TR band removed @ 0200.       Neuro: Does not follow. SULTANA to pain + spontaneously BLE. PERRL. Sedated.  Cardiac: SR  Pulmonary: LS clear. ETT 24 @ lip.   GI: Hypoactive BS. NPO. OG clamped.   : Carrasquillo low output.   Skin: Dry flaky. Wound to left lip. Redness in panus. +3 generalized edema. BLE Flakey, patty, moist, scaly. Rt access site: TR band.   Lines: 3PIV. No CVC, arterial line till 24hrs after tnkase.   Activity: Bedrest  Restraints: BUE soft.

## 2022-07-14 NOTE — CONSULTS
[facility]    Stroke Consult Note    Reason for Consult:  stroke    Chief Complaint: No chief complaint on file.       HPI  Cristy Bailey is a 77 year old female with HTN. She is wheelchair bound and her  assists with ADLs. She was brought to the [facility] ED 7/13 for evaluation of acute onset right gaze preference and left hemiparesis that began shortly after 1030. Imaging revealed a right carotid terminus/M1 occlusion. She received TNK and was transferred to [facility] for mechanical thrombectomy (TICI 3). There was a delay in these interventions due to need to confirm LKW with family and significant agitation requiring intubation.     Today, she remains intubated and sedated. Repeat CTH shows an evolving large R MCA stroke.     Stroke Evaluation Summarized    MRI/Head CT Unable to complete MRI.   MRI brain:   1. Evolution of acute infarct involving the right middle cerebral artery distribution with increased swelling, cortical effacement, and new mild right-to-left midline shift. Recommend close follow-up.  2. No evidence of hemorrhage.  3. Questionable hypoattenuation involving the bilateral occipital lobes which could be artifactual (MRI could be performed).  4. Small incidental meningioma along the left parietal lobe.   Intracranial Vasculature 1. Right carotid terminus occlusion. Right middle cerebral artery M1 segment occlusion with poor opacification of the more distal right MCA  branches/poor collateral flow.  2. Nonopacification of the A1 segment of the right anterior cerebral artery, with reconstitution of flow into the right amq-[id number] A1, A2, and more distal right anterior cerebral artery branches likely due  to collateral flow across a patent anterior communicating artery.  3. Otherwise, no significant stenosis or occlusion of the major intracranial arteries.   Cervical Vasculature 1. No significant stenosis or occlusion of the cervical carotid or  vertebral  "arteries.  2. Multiple thyroid nodules, some of which measure 1.5 cm or greater  in size. Recommend follow-up thyroid ultrasound.     Echocardiogram EF 50%, Grade I diastolic dysfunction, LA mildly dilated   EKG/Telemetry Sinus with first degree AVB   Other Testing Not Applicable     LDL  7/13/2022: 91 mg/dL   A1C  7/13/2022: 5.5 %   Troponin No lab value available in past 48 hrs       Impression  Acute ischemic stroke of R MCA territory due to embolic stroke of undetermined source (ESUS) - Large right MCA infarct in the setting of R carotid terminus-M1 occlusion s/p TNK and mechanical thrombectomy (TICI 3). Also noted to have possible occlusion of the R A1 segment.     Recommendations  - Okay to start  mg tonight  - LDL 91, recommend Lipitor 40 mg daily with goal LDL <100  - A1c within goal <7.0  - Maintain BP <180/105 until 24 hours post-TNK. Long term goal BP <140/90 with tighter control associated with decreased overall CV risk, if tolerated  - Therapies, when able  - Wean sedation, as tolerated  - Repeat CTH tomorrow     Patient Follow-up    - final recommendation pending work-up    Thank you for this consult. We will continue to follow.     ISRAEL Daniels, CNP  Neurology  To page me or covering stroke neurology team member, click here: AMCOM   Choose \"On Call\" tab at top, then search dropdown box for \"Neurology Adult\", select location, press Enter, then look for stroke/neuro ICU/telestroke.    _____________________________________________________    Clinically Significant Risk Factors Present on Admission                     Past Medical History   Past Medical History:   Diagnosis Date     Arthritis     hip     HTN (hypertension) 1/2010      Leg weakness 3/24/2015     Lyme disease 1980'S AND 2004    lYME DZ LIKE SXS RESPONDED TO ABX     Past Surgical History   Past Surgical History:   Procedure Laterality Date     ARTHROPLASTY HIP Right 7/30/2018    Procedure: ARTHROPLASTY HIP;  Right total hip " arthroplasty;  Surgeon: Bry Garcia MD;  Location: [facility] OR     ARTHROPLASTY HIP Left 2/12/2019    Procedure: Left total hip arthroplasty ([employer] 60 mm acetabulum with 2 screws; #15 standard neck Synergy stem; +0 neck 36 mm head) (extra difficult case because of her high BMI and deep subcutaneous fatty layer resulting in the operative time at least twice as long from typical operative time);  Surgeon: Chavo Rodriguez MD;  Location: [facility] OR     BIOPSY OF UTERUS LINING  3/2010    negative.      COLONOSCOPY  2/21/10    benign findings. advised 10 yr f/u.      INCISION AND DRAINAGE HIP, COMBINED Right 8/29/2018    Procedure: COMBINED INCISION AND DRAINAGE HIP;  Incision and debridement, right hip ;  Surgeon: Bry Garcia MD;  Location: [facility] OR     ORTHOPEDIC SURGERY Right 08/29/2018    Right hip I & D 8/26/18, Right JENAE, DOS 7/30/2018, Dr. Garcia. [facility]     ZZC C-SEC ONLY,PREV C-SEC      c-sec x 3      Medications   Home Meds  Prior to Admission medications    Medication Sig Start Date End Date Taking? Authorizing Provider   ACETAMINOPHEN PO Take by mouth every 8 hours as needed for pain   Yes Unknown, Entered By History   clotrimazole (LOTRIMIN) 1 % external cream Apply topically 2 times daily 6/16/22  Yes Manoj Daley PA-C   albuterol (PROVENTIL) (2.5 MG/3ML) 0.083% neb solution Take 1 vial (2.5 mg) by nebulization every 6 hours as needed for shortness of breath / dyspnea or wheezing  Patient not taking: Reported on 7/13/2022 1/25/21   Sandra Skinner Ra, APRN CNP       Scheduled Meds    chlorhexidine  15 mL Mouth/Throat Q12H     pantoprazole  40 mg Per Feeding Tube QAM AC    Or     pantoprazole (PROTONIX) IV  40 mg Intravenous QAM AC     sodium chloride (PF)  3 mL Intracatheter Q8H       Infusion Meds    - MEDICATION INSTRUCTIONS -       propofol (DIPRIVAN) infusion 20 mcg/kg/min (07/14/22 1228)    And     - MEDICATION INSTRUCTIONS -       sodium chloride 75  mL/hr at 22 1139       PRN Meds  hydrALAZINE, labetalol, lidocaine 4%, lidocaine (buffered or not buffered), - MEDICATION INSTRUCTIONS -, propofol (DIPRIVAN) infusion **AND** propofol **AND** CK total **AND** Triglycerides **AND** - MEDICATION INSTRUCTIONS - **AND** Notify Physician, ondansetron **OR** ondansetron, prochlorperazine **OR** prochlorperazine **OR** prochlorperazine, sodium chloride (PF)    Allergies   Allergies   Allergen Reactions     No Known Drug Allergies      Family History   Family History   Problem Relation Age of Onset     Cerebrovascular Disease Father         -stroke at age 80     Family History Negative Mother          Diedn her 80's of unknown causes.  Pt otherwise unaware of her health hx.      Unknown/Adopted Mother      Diabetes Brother         (Christian)  of DM complications at age 50.     Alcohol/Drug Brother         Christian     C.A.D. Brother         Christian.      Heart Disease Brother         Christian--MI age 50.     Family History Negative Brother      Family History Negative Brother      Family History Negative Sister      Family History Negative Sister      Family History Negative Son      Family History Negative Daughter      Family History Negative Daughter      Social History   Social History     Tobacco Use     Smoking status: Former Smoker     Quit date: 1984     Years since quittin.9     Smokeless tobacco: Never Used   Substance Use Topics     Alcohol use: No     Drug use: No       Review of Systems   Review of systems not obtained due to patient factors - intubation and sedation       PHYSICAL EXAMINATION   Temp:  [92.8  F (33.8  C)-98.6  F (37  C)] 98.6  F (37  C)  Pulse:  [56-96] 63  Resp:  [9-25] 15  BP: (102-152)/() 144/87  FiO2 (%):  [40 %-100 %] 40 %  SpO2:  [91 %-100 %] 98 %    Neurologic  Mental Status:  Intubated, sedation held ~10 minutes, raises thumb to command  Cranial Nerves:  PERRL, right gaze preference, does not track examiner,  difficult to assess facial symmetry given ETT, does not protrude tongue  Motor:  normal muscle tone and bulk, no abnormal movements, raises right leg to command, minimal movement of left foot to noxious, moves right hand to noxious, flicker movement in left hand to noxious  Reflexes:  deferred  Sensory:  see motor  Coordination:  unable to assess  Station/Gait:  deferred    Dysphagia Screen  intubated    Stroke Scales    NIHSS  1a. Level of Consciousness 2-->Not alert, requires repeated stimulation to attend, or is obtunded and requires strong or painful stimulation to make movements (not stereotyped)   1b. LOC Questions 2-->Answers neither question correctly   1c. LOC Commands 1-->Performs one task correctly   2.   Best Gaze 1-->Partial gaze palsy, gaze is abnormal in one or both eyes, but forced deviation or total gaze paresis is not present   3.   Visual 0-->No visual loss   4.   Facial Palsy 0-->Normal symmetrical movements   5a. Motor Arm, Left 3-->No effort against gravity, limb falls   5b. Motor Arm, Right 2-->Some effort against gravity, limb cannot get to or maintain (if cued) 90 (or 45) degrees, drifts down to bed, but has some effort against gravity   6a. Motor Leg, Left 3-->No effort against gravity, leg falls to bed immediately   6b. Motor Leg, right 2-->Some effort against gravity, leg falls to bed by 5 secs, but has some effort against gravity   7.   Limb Ataxia 0-->Absent   8.   Sensory 0-->Normal, no sensory loss   9.   Best Language 3-->Mute, global aphasia, no usable speech or auditory comprehension   10. Dysarthria (UN) Intubated or other physical barrier   11. Extinction and Inattention  0-->No abnormality   Total 19 (07/14/22 1637)       Modified Carline Score (Pre-morbid)  4 - Moderately severe disability.  Unable to attend to own bodily needs without assistance or unable to walk unassisted.    Imaging  I personally reviewed all imaging; relevant findings per HPI.    Labs Data   CBC  Recent  Labs   Lab 07/14/22  0510 07/13/22  1208   WBC 12.8* 8.7   RBC 4.65 4.66   HGB 13.4 13.5   HCT 43.0 43.4    279     Basic Metabolic Panel   Recent Labs   Lab 07/14/22  1247 07/14/22  0726 07/14/22  0510 07/13/22  1713 07/13/22  1208   NA  --   --  138  --  139   POTASSIUM  --   --  3.9  --  3.9   CHLORIDE  --   --  108  --  107   CO2  --   --  23  --  27   BUN  --   --  19  --  21   CR  --   --  0.57  --  0.67   * 106* 109*   < > 110*   ASHIA  --   --  8.0*  --  8.6    < > = values in this interval not displayed.     Liver Panel  No results for input(s): PROTTOTAL, ALBUMIN, BILITOTAL, ALKPHOS, AST, ALT, BILIDIRECT in the last 168 hours.  INR    Recent Labs   Lab Test 07/14/22  0510 07/13/22  1208 09/04/19  0000   INR 1.07 1.04 3.4*      Lipid Profile    Recent Labs   Lab Test 07/13/22  1853   CHOL 163   HDL 47*   LDL 91   TRIG 125     A1C    Recent Labs   Lab Test 07/13/22  1853 03/24/15  0827   A1C 5.5 5.9     Troponin I    Recent Labs   Lab 07/13/22  1208   TROPONINIS 5          Stroke Consult Data Data   This was a non-emergent, non-telestroke consult.

## 2022-07-14 NOTE — PROGRESS NOTES
Extubation Note    Successful completion of SBT (Yes or No): Yes  Extubation time: 1725    Patient assessment:  Lung sounds: Coarse  Stridor Present (Yes or No):  No  Patient tolerance: Well Tolerated    Oxygen device:  Liter flow: 4L via NC    SpO2: 93%    Plan: RT to follow if needed    Iban Rm, RT

## 2022-07-14 NOTE — PROGRESS NOTES
MICU Progress Note    Cristy Bailey MRN# [id number]   Age: 77 year old YOB: 1945     Date of Admission: 7/13/2022  Date of Service: 07/14/2022   ==================================================  24 HOUR EVENTS:   BP in control  WBC trending up, likely stress response  I/O negative 400      Changes for Today:  Wean sedation, assesses for extubation    ==================================================    ASSESSMENT AND PLAN:  Cristy Bailey is a 77 year old female with HO super morbid obesity and hyperlipidemia presented with complete left-sided paralysis, left-sided facial droop, and difficulty speaking admitted 7/13/2022 for right MCA occlusion.  She was treated with tenecteplase and mechanical thrombectomy, then transferred to the ICU intubated.      Neuro/psych:   -Sedation: Propofol     ## Right MCA M1 occlusion  Status post tenecteplase and mechanical thrombectomy 7/13.  -Goal SBP less than 180 DBP less than 105  -No antiplatelet or anticoagulation for 24 hours  -MRI with and without contrast ordered  -Neurosurgery consulted, appreciate their assistance  -Neuro critical care consulted, appreciate their assistance        Pulmonary:   ## Acute hypoxic respiratory failure  Inability to protect airway secondary to stroke.  -Continue mechanical ventilation, daily weaning trials        Cardiac:  ## Blood pressure management  Goal SBP less than 180, DBP less than 105  -As needed hydralazine and labetalol ordered        Renal:   No active issues        Infectious Disease:   No active issues        GI/:   -Nutrition: N.p.o.     ## Super morbid obesity  Requiring additional staff and specialized equipment        Endocrine:   No active issues        Heme:   No active concerns     Skin:   ## Lip laceration  Lip appears to have been lacerated during intubation        Prophylaxis:    -GI: PPI   -DVT: Mechanical.  No pharmacologic prophylaxis until 24 hours after  tenecteplase        Attestation:      CCT 35 min excluding procedures        This document was generated with the assistance of voice recognition software. Unintentional transcription errors may occur. Please contact the author for any clarification.    Diony Garcia M.D.  Pulmonary & Critical Care  Pager: Click Here to page    ==================================================    PHYSICAL EXAM  Temp:  [92.8  F (33.8  C)-98.2  F (36.8  C)] 98.2  F (36.8  C)  Pulse:  [56-96] 56  Resp:  [9-25] 15  BP: (102-152)/() 134/77  FiO2 (%):  [40 %-100 %] 40 %  SpO2:  [91 %-100 %] 99 %    Vent Mode: CMV/AC  (Continuous Mandatory Ventilation/ Assist Control)  FiO2 (%): 40 %  Resp Rate (Set): 16 breaths/min  Tidal Volume (Set, mL): 450 mL  PEEP (cm H2O): 8 cmH2O  Resp: 15      General: Sedated, intubated  Resp: CTAB, no crackles or wheezes  Cardiac: RRR, NS1,S2, No m/r/g  Abdomen: Soft, reduced bowel sounds  Extremities: 1-2+ dependent edema  Skin: Warm and dry, venous stasis changes in bilateral lower extremities        =====================================================  LABORATORY DATA    Labs reviewed by me personally, significant abnormalities detailed in assessment and plan.      ROUTINE ICU LABS (Last four results)  CMP  Recent Labs   Lab 07/14/22  0510 07/13/22  2312 07/13/22  1713 07/13/22  1208     --   --  139   POTASSIUM 3.9  --   --  3.9   CHLORIDE 108  --   --  107   CO2 23  --   --  27   ANIONGAP 7  --   --  5   * 110* 148* 110*   BUN 19  --   --  21   CR 0.57  --   --  0.67   GFRESTIMATED >90  --   --  >90   ASHIA 8.0*  --   --  8.6   MAG 2.2  --   --   --    PHOS 2.8  --   --   --      CBC  Recent Labs   Lab 07/14/22  0510 07/13/22  1208   WBC 12.8* 8.7   RBC 4.65 4.66   HGB 13.4 13.5   HCT 43.0 43.4   MCV 93 93   MCH 28.8 29.0   MCHC 31.2* 31.1*   RDW 14.2 14.5    279     INR  Recent Labs   Lab 07/14/22  0510 07/13/22  1208   INR 1.07 1.04     Arterial Blood Gas  Recent Labs   Lab  07/13/22  2314   O2PER 40     Venous Blood Gas   Recent Labs   Lab 07/13/22  2314   PHV 7.41   PCO2V 44   PO2V 28   HCO3V 28   FAUSTINO 3.2*   O2PER 40         No results for input(s): CULT in the last 168 hours.      Recent Results (from the past 48 hour(s))   CT Head w/o Contrast    Narrative    CT HEAD WITHOUT CONTRAST  7/13/2022 12:31 PM    INDICATION: Left facial droop and paralysis.    TECHNIQUE: CT scan of the head without contrast. Dose reduction  techniques were used.    COMPARISON: None.    FINDINGS:  The ventricles are normal in size and configuration. There  is patchy hypodensity involving the right lentiform nucleus, the upper  aspect of the right caudate head, the right insula, and probable  subtle asymmetric hypodensity of the right internal capsule/corona  radiata, concerning for early signs of acute infarct (ASPECT score =  approximately 6). Hyperdense M1 segment of the right middle cerebral  artery and the proximal A1 segment of the right anterior cerebral  artery noted; please see separate report from CTA of head and neck of  same date for additional details. No acute intracranial hemorrhage  identified. No significant midline shift/herniation. There is a small  calcified extra-axial mass overlying the posterior left parietal lobe  measuring 1.4 x 0.9 x 1.0 cm (series 3 image 24). No significant  associated mass effect or obvious edema in the underlying left  parietal lobe brain parenchyma. There is mild generalized brain  parenchymal volume loss and mild patchy nonspecific hypodensity in the  cerebral white matter, likely due to chronic small vessel ischemic  change.    The paranasal sinuses and mastoids are clear. The calvarium is intact.  Mild asymmetric anterior subluxation of the right greater than left  mandible condyles with respect to the glenoid fossae, possibly  positional.      Impression    IMPRESSION:  1. Findings concerning for evolving acute right middle cerebral artery  territory  infarct, as described.  2. No acute intracranial hemorrhage or significant mass  effect/herniation.  3. Calcified extra-axial mass overlying the left parietal region  measuring 1.4 cm, potentially representing a meningioma.  4. Brain atrophy and presumed chronic small vessel ischemic change, as  described.    Findings from noncontrast head CT and CTA head and neck discussed with  Dr. Russo by myself at approximately 12:50 PM on 7/13/2022.    NAHOMY MAST MD         SYSTEM ID:  [id number]   CTA Head Neck w Contrast    Narrative    CT ANGIOGRAM OF THE HEAD AND NECK WITH CONTRAST  7/13/2022 12:32 PM     HISTORY: Code Stroke tier 1.     TECHNIQUE:  CT angiography with an injection of 75mL [id number]-[id number] IV  with scans through the head and neck. Images were transferred to a  separate 3-D workstation where multiplanar reformations and 3-D images  were created. Estimates of carotid stenoses are made relative to the  distal internal carotid artery diameters except as noted. Radiation  dose for this scan was reduced using automated exposure control,  adjustment of the mA and/or kV according to patient size, or iterative  reconstruction technique.    COMPARISON: CT head of same day.     CT HEAD FINDINGS: There is relative hypoenhancement involving the  right basal ganglia, insular/subinsular region and right internal  capsule/corona radiata, concerning for evolving acute ischemia/infarct  in the right middle cerebral artery territory.    CT ANGIOGRAM HEAD FINDINGS: There is mild atherosclerosis of the  bilateral carotid siphons. There is occlusion of the right carotid  terminus, including the origins of the M1 segment of the right middle  cerebral artery and the A1 segment of the right anterior cerebral  artery. The mid to distal A1 segment of the right anterior cerebral  artery and the A2 and more distal segments of the right anterior  cerebral artery are opacified with contrast material, likely due to  collateral flow  across the patent anterior communicating artery. There  is long segment occlusion of the M1 segment of the right middle  cerebral artery that appears to extend into the proximal M2 segments  with poor opacification of the more distal right middle cerebral  artery branches. The proximal branches of the left middle cerebral  artery appear patent. The bilateral vertebral arteries, the basilar  artery, and the proximal branches of the posterior cerebral arteries  appear patent. No aneurysm or high flow vascular malformation is  identified.    CT ANGIOGRAM NECK FINDINGS:   Normal origin of the great vessels from the aortic arch.     Right carotid artery: The right common and internal carotid arteries  are patent. No significant stenosis or atherosclerotic disease in the  carotid artery.     Left carotid artery: The left common and internal carotid arteries are  patent. No significant stenosis or atherosclerotic disease in the  carotid artery.     Vertebral arteries: Vertebral arteries are patent without evidence of  dissection. Mildly tortuous vertebral arteries. No flow-limiting  stenosis.     Other findings: Multiple thyroid nodules, with at least some measuring  1.5 cm or greater. Mildly prominent scattered upper to mid cervical  lymph nodes, nonspecific, but possibly reactive. Degenerative changes  noted in the cervical spine.      Impression    IMPRESSION:    CTA HEAD:  1. Right carotid terminus occlusion. Right middle cerebral artery M1  segment occlusion with poor opacification of the more distal right MCA  branches/poor collateral flow.  2. Nonopacification of the A1 segment of the right anterior cerebral  artery, with reconstitution of flow into the right [id number] A1,  A2, and more distal right anterior cerebral artery branches likely due  to collateral flow across a patent anterior communicating artery.  3. Otherwise, no significant stenosis or occlusion of the major  intracranial arteries.    CTA  NECK:  1. No significant stenosis or occlusion of the cervical carotid or  vertebral arteries.  2. Multiple thyroid nodules, some of which measure 1.5 cm or greater  in size. Recommend follow-up thyroid ultrasound.    Findings from the noncontrast head CT and CTA head and neck discussed  by myself by phone with Dr. Russo at approximately 12:50 PM on  7/13/2022.      NAHOMY MAST MD         SYSTEM ID:  [id number]   XR Chest Port 1 View    Narrative    XR CHEST PORT 1 VIEW   7/13/2022 2:21 PM     HISTORY: post intubation    COMPARISON: CT 11/6/2021      Impression    IMPRESSION: Cardiomediastinal silhouette is mildly enlarged. Placement  of endotracheal tube with tip approximately 4 cm above the elbert.  Nasogastric tube courses below the diaphragm, tip beyond the field of  view. There is pulmonary vascular congestion and likely moderate  pulmonary edema. No definite pleural effusion or pneumothorax. Left  inferior lung is not well visualized to overlying hyperdense  structure, likely external to the patient. No acute bony abnormality.    SHARONA VELAZQUEZ MD         SYSTEM ID:  [id number]   CT Head w/o Contrast    Narrative    EXAM: CT HEAD W/O CONTRAST  LOCATION: [facility]  DATE/TIME: 7/13/2022 4:52 PM    INDICATION: Follow-up right middle cerebral artery stroke status post mechanical thrombectomy.  COMPARISON: 07/13/2022.  TECHNIQUE: Routine CT Head without IV contrast. Multiplanar reformats. Dose reduction techniques were used.    FINDINGS:  INTRACRANIAL CONTENTS: No intracranial hemorrhage, extraaxial collection, or mass effect.  No CT evidence of acute infarct. There is low-attenuation more prominent in interval involving the right insular cortex and the right temporal lobe. There does   appear to be better visualization of the right basal ganglia. There is also scattered diffuse low attenuation within the periventricular and subcortical white matter consistent with diffuse small  vessel ischemic disease. There is a stable small calcified   presumed meningioma in the posterior left parietal region with the surrounding brain parenchyma unremarkable. The ventricular system, basal cisterns and the cortical sulci are with diffuse volume loss.     VISUALIZED ORBITS/SINUSES/MASTOIDS: No intraorbital abnormality. No paranasal sinus mucosal disease. No middle ear or mastoid effusion.    BONES/SOFT TISSUES: No acute abnormality.      Impression    IMPRESSION:  1.  Early ischemia right insular cortex and right temporal lobe consistent with right middle cerebral artery territory.  2.  No evidence of midline shift or hemorrhage.  3.  Stable diffuse age related changes.           ==================================================

## 2022-07-15 NOTE — PROGRESS NOTES
07/15/22 0930   Quick Adds   Type of Visit Initial Occupational Therapy Evaluation   Living Environment   People in Home friend(s)   Current Living Arrangements house   Home Accessibility wheelchair accessible  (Ramp)   Transportation Anticipated family or friend will provide   Living Environment Comments Pt lives in home w/friend Leo, ramp to enter and she stays on main floor. See below.   Self-Care   Usual Activity Tolerance poor   Current Activity Tolerance poor   Regular Exercise No   Equipment Currently Used at Home grab bar, toilet;grab bar, tub/shower;walker, rolling;wheelchair, manual  (lift recliner)   Fall history within last six months no   Activity/Exercise/Self-Care Comment Pt lives with friend Leo, she mostly stays in her electric recliner and uses it to stand up to walker for standing pivot transfers to  - Leo provides assist with transfer. Leo does all cares including hygiene after toileting, dresses (stays in simple pajamas most of time) and sponge-bathes her, manages meds, does all HH tasks, driving. He reports it has become more difficult for her to transfer of late. She also sleeps in lift chair.   General Information   Onset of Illness/Injury or Date of Surgery 07/13/22   Patient/Family Therapy Goal Statement (OT) unable to attain from pt due to confusion   Additional Occupational Profile Info/Pertinent History of Current Problem 77 year old female with HO super morbid obesity and hyperlipidemia presented with complete left-sided paralysis, left-sided facial droop, and difficulty speaking admitted 7/13/2022 for right MCA occlusion.  She was treated with tenecteplase and mechanical thrombectomy, then transferred to the ICU intubated. Extubated 7/14   Existing Precautions/Restrictions fall;oxygen therapy device and L/min   Limitations/Impairments safety/cognitive   Cognitive Status Examination   Orientation Status person   Affect/Mental Status (Cognitive) confused   Follows Commands  "follows one-step commands;75-90% accuracy;repetition of directions required;verbal cues/prompting required;physical/tactile prompts required;initiation impaired;increased processing time needed   Safety Deficit awareness of need for assistance;insight into deficits/self-awareness;judgment;safety precautions awareness   Memory Deficit immediate recall   Attention Deficit distractible in noisy environment;focused/sustained attention;alternating attention;divided attention;requires cues/redirection to task   Executive Function Deficit information processing   Cognitive Status Comments Oriented to self, year of birth, and was able to give some baseline info accurately otherwise stated to \"ask Leo\" \"I can't remember\". Stated month was May and year [birth year]__ \"can't remember\". Very insistent that she was at home in her lift recliner, repetitively calling for Leo (not present until end of session) and looking for her recliner remote.   Visual Perception   Impact of Vision Impairment on Function (Vision) Appears intact, pt reports she wears corrective lenses for distance but could read clock correctly on wall, tracked smoothly x 4Q, no indication of visual field cuts.   Pain Assessment   Patient Currently in Pain Yes, see Vital Sign flowsheet  (with movement, c/o back pain)   Integumentary/Edema   Integumentary/Edema Comments B LE dependent edema. Skin:Warm and dry, venous stasis changes in bilateral lower extremities   Range of Motion Comprehensive   General Range of Motion no range of motion deficits identified   Comment, General Range of Motion B josh appear WFL   Strength Comprehensive (MMT)   Comment, General Manual Muscle Testing (MMT) Assessment R josh 5/5, L 4/5. B  strength functional, maybe L slightly weaker but again functional   Coordination   Gross Motor Coordination Minimally  impaired finger-nose on L   Bed Mobility   Comment (Bed Mobility) Rolling in bed - unable to roll to either side with Max A of 2. Lift " dependent.   Transfers   Transfer Comments Mechanical lift dependent.   Activities of Daily Living   BADL Assessment/Intervention   (Dependent all cares currently in ICU and many at baseline)   Clinical Impression   Criteria for Skilled Therapeutic Interventions Met (OT) Yes, treatment indicated   OT Diagnosis impaired ADL and mobility performance   OT Problem List-Impairments impacting ADL problems related to;cognition;activity tolerance impaired;balance;strength;pain   Assessment of Occupational Performance 1-3 Performance Deficits   Identified Performance Deficits functional mobility, grooming   Planned Therapy Interventions (OT) ADL retraining;transfer training;cognition   Clinical Decision Making Complexity (OT) moderate complexity   Anticipated Equipment Needs Upon Discharge (OT) bariatric equipment;lift device   Risk & Benefits of therapy have been explained evaluation/treatment results reviewed;care plan/treatment goals reviewed;risks/benefits reviewed;current/potential barriers reviewed;participants voiced agreement with care plan;participants included;patient;caregiver   OT Discharge Planning   OT Discharge Recommendation (DC Rec) home with assist;Long term care facility   OT Rationale for DC Rec Patient presents near her baseline level of function as confirmed by her caregiver Leo although has become increasingly deconditioned recently and now presents with confusion. Patient is basically immobile at home although was able to do standing pivot transfer with Leo's help once lifted to stand from her lift recliner. Pt is likely a poor rehab candidate however therapies to trial during inpatient stay. Recommend return home w/continued assist and use of mechanical lift device for transfers to ease caregiver burden and risk of injury and/or consideration of long term care situation.   OT Brief overview of current status Pt confused, requires repetitive re-orientation as stating she is at home, insisting  "caregiver \"Leo\" in room and searching for remote to adjust her lift recilner. She is  oriented to self, year of her birth. She did give fairly accurate report of her baseline function and situation or stated she didn't remember and to ask Leo (who did arrive later and provided clarification). BUE ROM and strength appear at or near baseline, ROM WFL throughout, R josh 5/5 and L slightly weaker at 4/5, B  strength WFL w/L slightly weaker but not her dominant hand. Pt able to read clock on wall, followed target tracking smoothly x 4Q, when able to be redirected and stay on task she did follow 1-step commands fairly consistently. Attempted transfere to EOB and unable to achieve even roll to side with Max A of 2 as pt unable to assist and cites back pain - can not tolerate bed flat. Currently dependent with mobility and self-cares.   Total Evaluation Time (Minutes)   Total Evaluation Time (Minutes) 35  (plus consults w/caregiver, nursing mgr, nurse and rep from transfer equipment for total of approx an hour?)   OT Goals   Therapy Frequency (OT) 3 times/wk   OT Predicted Duration/Target Date for Goal Attainment 07/22/22   OT Goals Hygiene/Grooming;Toilet Transfer/Toileting;Cognition   OT: Hygiene/Grooming supervision/stand-by assist;from wheelchair   OT: Toilet Transfer/Toileting Maximum assist;toilet transfer  (wc<>toilet w/Max A of 1)   OT: Cognitive Patient/caregiver will verbalize understanding of cognitive assessment results/recommendations as needed for safe discharge planning   Psychosocial Support   Family/Support System Care caregiver stress acknowledged   Trust Relationship/Rapport care explained;choices provided;emotional support provided;empathic listening provided;questions answered;questions encouraged;reassurance provided;thoughts/feelings acknowledged     "

## 2022-07-15 NOTE — PROGRESS NOTES
[facility]    Stroke Progress Note    Interval EventsExtubated successfully 7/14/22. VSS. Started on ASA yesterday. Repeat CTH today stable. Following commands and A&O x2.     HPI Summary  Cristy Bailey is a 77 year old female with HTN, cognitive decline at baseline per daughter, wheelchair use at baseline with ADL assistance from  at home.     She presented to [facility] ED 7/13/22 with R gaze deviation and L hemiparesis. Found to have a R carotid terminus/M1 occlusion s/p TNK and transferred to [facility] s/p thrombectomy TICI 3. Unable to obtain MRI secondary to body habitus. Repeat CTH with some mild increased swelling with cortical effacement and new mild R to L midline shift.       Stroke Evaluation Summarized     MRI/Head CT Unable to complete MRI.   Repeat CTH:  1. Evolution of acute infarct involving the right middle cerebral artery distribution with increased swelling, cortical effacement, and new mild right-to-left midline shift. Recommend close follow-up.  2. No evidence of hemorrhage.  3. Questionable hypoattenuation involving the bilateral occipital lobes which could be artifactual (MRI could be performed).  4. Small incidental meningioma along the left parietal lobe.   Intracranial Vasculature 1. Right carotid terminus occlusion. Right middle cerebral artery M1 segment occlusion with poor opacification of the more distal right MCA  branches/poor collateral flow.  2. Nonopacification of the A1 segment of the right anterior cerebral artery, with reconstitution of flow into the right [id number] A1, A2, and more distal right anterior cerebral artery branches likely due  to collateral flow across a patent anterior communicating artery.  3. Otherwise, no significant stenosis or occlusion of the major intracranial arteries.   Cervical Vasculature 1. No significant stenosis or occlusion of the cervical carotid or vertebral arteries.  2. Multiple thyroid nodules, some of which  "measure 1.5 cm or greater in size. Recommend follow-up thyroid ultrasound.      Echocardiogram EF 50%, Grade I diastolic dysfunction, LA mildly dilated   EKG/Telemetry Sinus with first degree AVB   Other Testing Not Applicable      LDL  7/13/2022: 91 mg/dL   A1C  7/13/2022: 5.5 %   Troponin No lab value available in past 48 hrs          Impression   Large acute ischemic stroke of R MCA territory with R carotid terminus-M1 occlusion and possible occlusion of the R A1 segment with cerebral edema, cortical effacement and mild R to L midline shift s/p TNK and mechanical thrombectomy (TICI 3), suspect due to embolic stroke of undetermined source (ESUS)     Plan  -every 4 hour neuro checks and vitals  -continue  mg daily, VTE ppx okay  -LDL 91, Lipitor 40 mg daily, goal LDL <100, <40 increases risk of ICH  -euthermia, euglycemia, eunatremia, Na 141 today  -She is normotensive right now without intervention, if BP drops and is dry can consider NS bolus, otherwise no intervention needed if not having focal deficits, long term blood pressure goal <140/90  -limit sedation as tolerated  -PT/OT/SPT as tolerated  -blood glucose monitoring, A1c 5.5%, at goal <7%  -please page stroke team with any acute neuro change      Patient Follow-up    - final recommendation pending work-up   -f/u evaluation of thyroid nodules outpatient    We will continue to follow.     Tessie Morillo PA-C  Vascular Neurology  To page me or covering stroke neurology team member, click here: [facility]   Choose \"On Call\" tab at top, then search dropdown box for \"Neurology Adult\", select location, press Enter, then look for stroke/neuro ICU/telestroke.    ______________________________________________________    Clinically Significant Risk Factors Present on Admission     Medications   Scheduled Meds    aspirin  325 mg Oral Daily     atorvastatin  40 mg Oral QPM     enoxaparin ANTICOAGULANT  40 mg Subcutaneous Q12H       Infusion Meds    - MEDICATION " INSTRUCTIONS -       sodium chloride 75 mL/hr at 07/15/22 1300       PRN Meds  hydrALAZINE, HYDROmorphone, labetalol, lidocaine 4%, lidocaine (buffered or not buffered), - MEDICATION INSTRUCTIONS -, naloxone **OR** naloxone **OR** naloxone **OR** naloxone, ondansetron **OR** ondansetron       PHYSICAL EXAMINATION  Temp:  [97.3  F (36.3  C)-98.8  F (37.1  C)] 98.8  F (37.1  C)  Pulse:  [62-79] 78  Resp:  [10-24] 16  BP: ()/(57-95) 106/59  SpO2:  [92 %-98 %] 95 %      General Exam  General:  patient lying in bed without any acute distress    HEENT:  normocephalic/atraumatic  Pulmonary:  no respiratory distress    Neuro Exam  Mental Status:  alert, oriented x 2 says she is at [facility], follows commands, speech clear and fluent, naming and repetition normal  Cranial Nerves:  visual fields intact, PERRL, EOMI with normal smooth pursuit, facial sensation intact and symmetric, facial movements symmetric, hearing not formally tested but intact to conversation, tongue protrusion midline, mild dysarthria  Motor:  Drift to LUE not hitting bed, no drift to RUE, movement but no effort against gravity to b/l LE  Reflexes:  toes down-going  Sensory:  light touch sensation intact and symmetric throughout upper and lower extremities, no extinction on double simultaneous stimulation   Coordination:  No ataxia out of proportion to weakness  Station/Gait:  deferred    Stroke Scales    NIHSS  1a. Level of Consciousness 0-->Alert, keenly responsive   1b. LOC Questions (S) 2-->Answers neither question correctly (says 76 and May)   1c. LOC Commands 0-->Performs both tasks correctly   2.   Best Gaze 0-->Normal   3.   Visual 0-->No visual loss   4.   Facial Palsy (S) 1-->Minor paralysis (flattened nasolabial fold, asymmetry on smiling) (L facial droop)   5a. Motor Arm, Left 1-->Drift, limb holds 90 (or 45) degrees, but drifts down before full 10 seconds, does not hit bed or other support   5b. Motor Arm, Right 0-->No drift,  limb holds 90 (or 45) degrees for full 10 secs   6a. Motor Leg, Left 3-->No effort against gravity, leg falls to bed immediately   6b. Motor Leg, right 3-->No effort against gravity, leg falls to bed immediately   7.   Limb Ataxia (S) 0-->Absent (not out of proportion to weakness)   8.   Sensory 0-->Normal, no sensory loss   9.   Best Language 0-->No aphasia, normal   10. Dysarthria 1-->Mild-to-moderate dysarthria, patient slurs at least some words and, at worst, can be understood with some difficulty   11. Extinction and Inattention  0-->No abnormality   Total 11 (07/15/22 1149)         Imaging  I personally reviewed all imaging; relevant findings per HPI.     Lab Results Data   CBC  Recent Labs   Lab 07/15/22  0501 07/14/22  0510 07/13/22  1208   WBC 11.7* 12.8* 8.7   RBC 4.35 4.65 4.66   HGB 12.5 13.4 13.5   HCT 39.8 43.0 43.4    235 279     Basic Metabolic Panel    Recent Labs   Lab 07/15/22  0501 07/14/22  2155 07/14/22  1613 07/14/22  0726 07/14/22  0510 07/13/22  1713 07/13/22  1208     --   --   --  138  --  139   POTASSIUM 3.5  --   --   --  3.9  --  3.9   CHLORIDE 110*  --   --   --  108  --  107   CO2 27  --   --   --  23  --  27   BUN 13  --   --   --  19  --  21   CR 0.56  --   --   --  0.57  --  0.67   GLC 93 112* 120*   < > 109*   < > 110*   ASHIA 7.5*  --   --   --  8.0*  --  8.6    < > = values in this interval not displayed.     Liver Panel  No results for input(s): PROTTOTAL, ALBUMIN, BILITOTAL, ALKPHOS, AST, ALT, BILIDIRECT in the last 168 hours.  INR    Recent Labs   Lab Test 07/14/22  0510 07/13/22  1208 09/04/19  0000   INR 1.07 1.04 3.4*      Lipid Profile    Recent Labs   Lab Test 07/13/22  1853   CHOL 163   HDL 47*   LDL 91   TRIG 125     A1C    Recent Labs   Lab Test 07/13/22  1853 03/24/15  0827   A1C 5.5 5.9     Troponin I    Recent Labs   Lab 07/13/22  1208   TROPONINIS 5       Billing: I personally examined and evaluated the patient today. At the time of my evaluation and  management the patient was critical condition today due to R MCA infarct with cerebral edema s/p TNK and thrombectomy, intubated and sedated. I spent a total of 45 minutes providing critical care services, evaluating the patient, directing care and reviewing laboratory values and radiologic reports.

## 2022-07-15 NOTE — PLAN OF CARE
Assumed pt care [id number]. Pt repeatedly using call light all evening. Starting to call out for her  and thought he was sitting by her. Pt disoriented to time and situation, PERRL, L drift, L droop and L sided weakness. SBP remains <140. Tolerating 4L nc, able to clear secretions. Pills crushed up in apple sauce, tolerated well. Will continue to monitor.

## 2022-07-15 NOTE — PHARMACY-CONSULT NOTE
Pharmacy Consult to evaluate for medication related stroke core measures    Cristy Bailey, 77 year old female admitted for Acute ischemic stroke of R MCA territory  on 7/13/2022.    Thrombolytic was given on 7/13 at 1311.    VTE Prophylaxis SCDs /PCDs placed on 7/13, as appropriate prior to end of hospital day 2.  Lovenox ordered 7/15 (BID for BMI >40)    Antithrombotic: aspirin started on 7/14, as appropriate by end of hospital day 2. Continue antithrombotic therapy on discharge to meet quality measures, unless contraindicated.    Anticoagulation if history of A-fib/flutter: Patient does not have history of A-fib/flutter - anticoagulation not required for medication related stroke core measures.     LDL Cholesterol Calculated   Date Value Ref Range Status   07/13/2022 91 <=100 mg/dL Final   03/25/2014 137 (H) 0 - 129 mg/dL Final     Comment:     LDL Cholesterol is the primary guide to therapy: LDL-cholesterol goal in high   risk patients is <100 mg/dL and in very high risk patients is <70 mg/dL.       Patient currently receiving Lipitor (atorvastatin) continue statin on discharge to meet quality measures, unless contraindicated.    Recommendations: None at this time    Thank you for the consult.    Emelia Julien [facility] 7/14/2022 8:55 PM

## 2022-07-15 NOTE — PROGRESS NOTES
MICU Progress Note    Cristy Bailey MRN# [id number]   Age: 77 year old YOB: 1945     Date of Admission: 7/13/2022  Date of Service: 07/15/2022   ==================================================  24 HOUR EVENTS:   Stable post extubation. A lot of back pain.    Changes for Today:  Therapy, particularly speech  Pain management  Transfer out of ICU    ==================================================    ASSESSMENT AND PLAN:  Cristy Bailey is a 77 year old female with HO super morbid obesity and hyperlipidemia presented with complete left-sided paralysis, left-sided facial droop, and difficulty speaking admitted 7/13/2022 for right MCA occlusion.  She was treated with tenecteplase and mechanical thrombectomy, then transferred to the ICU intubated. Extubated 7/14. More movement on the left and generally stable other than back pain.       Neuro/psych:  ## Right MCA M1 occlusion. Status post tenecteplase and mechanical thrombectomy 7/13. Imaging stable. Moving left side more than on admission. No evidence of hemorrhagic transformation  ## Back pain. Chronic per chart review. Severity related to position. Very sensitive to opioids. Depressed LOC with 0.2 of Hydromorphone.   -Goal SBP less than 180 DBP less than 105.  - Antiplatelet/DVT prophylaxis to restart when OK with Neurology.  - PT/OT. ASAP Speech pathology so she can take oral medications  - Needs step down for now to address pain and sensitivity to opioids.   - Further investigation if necessary.         Pulmonary:   ## Loss of protective airway reflexes during thrombectomy and sedation. Extubated without difficulty on 7/14. No current concerns.  -Wean O2 to off.        Cardiac:  ## Blood pressure management. Not on agents at home. BP in normal range.. Goal SBP less than 180, DBP less than 105  -As needed hydralazine and labetalol ordered        Renal:   No active issues        Infectious Disease:   No active issues        GI/:    -Nutrition: N.p.o.     ## Super morbid obesity  Requiring additional staff and specialized equipment        Endocrine:   No active issues        Heme:   No active concerns.     Skin:   ## Lip laceration  Lip appears to have been lacerated during intubation. Stable        Prophylaxis:    -GI: PPI   -DVT: Mechanical.  Defer decision on starting medical to Neurologists            This document was generated with the assistance of voice recognition software. Unintentional transcription errors may occur. Please contact the author for any clarification.    Elin Yan MD    ==================================================    PHYSICAL EXAM  Temp:  [97.3  F (36.3  C)-98.8  F (37.1  C)] 98.8  F (37.1  C)  Pulse:  [55-79] 70  Resp:  [10-24] 23  BP: ()/(57-95) 112/59  FiO2 (%):  [40 %] 40 %  SpO2:  [92 %-100 %] 97 %    Vent Mode: CMV/AC  (Continuous Mandatory Ventilation/ Assist Control)  FiO2 (%): 40 %  Resp Rate (Set): 16 breaths/min  Tidal Volume (Set, mL): 450 mL  PEEP (cm H2O): 8 cmH2O  Resp: 23      General: Sedated, intubated  Resp: CTAB, no crackles or wheezes  Cardiac: RRR, NS1,S2, No m/r/g  Abdomen: Soft, reduced bowel sounds  Extremities: 1-2+ dependent edema  Skin: Warm and dry, venous stasis changes in bilateral lower extremities        =====================================================  LABORATORY DATA    Labs reviewed by me personally, significant abnormalities detailed in assessment and plan.      ROUTINE ICU LABS (Last four results)  CMP  Recent Labs   Lab 07/15/22  0501 07/14/22  2155 07/14/22  1613 07/14/22  1247 07/14/22  0726 07/14/22  0510 07/13/22  1713 07/13/22  1208     --   --   --   --  138  --  139   POTASSIUM 3.5  --   --   --   --  3.9  --  3.9   CHLORIDE 110*  --   --   --   --  108  --  107   CO2 27  --   --   --   --  23  --  27   ANIONGAP 4  --   --   --   --  7  --  5   GLC 93 112* 120* 102*   < > 109*   < > 110*   BUN 13  --   --   --   --  19  --  21   CR 0.56  --   --    --   --  0.57  --  0.67   GFRESTIMATED >90  --   --   --   --  >90  --  >90   ASHIA 7.5*  --   --   --   --  8.0*  --  8.6   MAG 2.1  --   --   --   --  2.2  --   --    PHOS 2.6  --   --   --   --  2.8  --   --     < > = values in this interval not displayed.     CBC  Recent Labs   Lab 07/15/22  0501 07/14/22  0510 07/13/22  1208   WBC 11.7* 12.8* 8.7   RBC 4.35 4.65 4.66   HGB 12.5 13.4 13.5   HCT 39.8 43.0 43.4   MCV 92 93 93   MCH 28.7 28.8 29.0   MCHC 31.4* 31.2* 31.1*   RDW 14.6 14.2 14.5    235 279     INR  Recent Labs   Lab 07/14/22  0510 07/13/22  1208   INR 1.07 1.04     Arterial Blood Gas  Recent Labs   Lab 07/14/22  1614 07/13/22  2314   PH 7.39  --    PCO2 45  --    PO2 60*  --    HCO3 27  --    O2PER 40 40     Venous Blood Gas   Recent Labs   Lab 07/14/22  1614 07/13/22  2314   PHV  --  7.41   PCO2V  --  44   PO2V  --  28   HCO3V  --  28   FAUSTINO  --  3.2*   O2PER 40 40         No results for input(s): CULT in the last 168 hours.      Recent Results (from the past 48 hour(s))   CT Head w/o Contrast    Narrative    CT HEAD WITHOUT CONTRAST  7/13/2022 12:31 PM    INDICATION: Left facial droop and paralysis.    TECHNIQUE: CT scan of the head without contrast. Dose reduction  techniques were used.    COMPARISON: None.    FINDINGS:  The ventricles are normal in size and configuration. There  is patchy hypodensity involving the right lentiform nucleus, the upper  aspect of the right caudate head, the right insula, and probable  subtle asymmetric hypodensity of the right internal capsule/corona  radiata, concerning for early signs of acute infarct (ASPECT score =  approximately 6). Hyperdense M1 segment of the right middle cerebral  artery and the proximal A1 segment of the right anterior cerebral  artery noted; please see separate report from CTA of head and neck of  same date for additional details. No acute intracranial hemorrhage  identified. No significant midline shift/herniation. There is a  small  calcified extra-axial mass overlying the posterior left parietal lobe  measuring 1.4 x 0.9 x 1.0 cm (series 3 image 24). No significant  associated mass effect or obvious edema in the underlying left  parietal lobe brain parenchyma. There is mild generalized brain  parenchymal volume loss and mild patchy nonspecific hypodensity in the  cerebral white matter, likely due to chronic small vessel ischemic  change.    The paranasal sinuses and mastoids are clear. The calvarium is intact.  Mild asymmetric anterior subluxation of the right greater than left  mandible condyles with respect to the glenoid fossae, possibly  positional.      Impression    IMPRESSION:  1. Findings concerning for evolving acute right middle cerebral artery  territory infarct, as described.  2. No acute intracranial hemorrhage or significant mass  effect/herniation.  3. Calcified extra-axial mass overlying the left parietal region  measuring 1.4 cm, potentially representing a meningioma.  4. Brain atrophy and presumed chronic small vessel ischemic change, as  described.    Findings from noncontrast head CT and CTA head and neck discussed with  Dr. Russo by myself at approximately 12:50 PM on 7/13/2022.    NAHOMY MAST MD         SYSTEM ID:  [id number]   CTA Head Neck w Contrast    Narrative    CT ANGIOGRAM OF THE HEAD AND NECK WITH CONTRAST  7/13/2022 12:32 PM     HISTORY: Code Stroke tier 1.     TECHNIQUE:  CT angiography with an injection of 75mL Isovue-370 IV  with scans through the head and neck. Images were transferred to a  separate 3-D workstation where multiplanar reformations and 3-D images  were created. Estimates of carotid stenoses are made relative to the  distal internal carotid artery diameters except as noted. Radiation  dose for this scan was reduced using automated exposure control,  adjustment of the mA and/or kV according to patient size, or iterative  reconstruction technique.    COMPARISON: CT head of same day.      CT HEAD FINDINGS: There is relative hypoenhancement involving the  right basal ganglia, insular/subinsular region and right internal  capsule/corona radiata, concerning for evolving acute ischemia/infarct  in the right middle cerebral artery territory.    CT ANGIOGRAM HEAD FINDINGS: There is mild atherosclerosis of the  bilateral carotid siphons. There is occlusion of the right carotid  terminus, including the origins of the M1 segment of the right middle  cerebral artery and the A1 segment of the right anterior cerebral  artery. The mid to distal A1 segment of the right anterior cerebral  artery and the A2 and more distal segments of the right anterior  cerebral artery are opacified with contrast material, likely due to  collateral flow across the patent anterior communicating artery. There  is long segment occlusion of the M1 segment of the right middle  cerebral artery that appears to extend into the proximal M2 segments  with poor opacification of the more distal right middle cerebral  artery branches. The proximal branches of the left middle cerebral  artery appear patent. The bilateral vertebral arteries, the basilar  artery, and the proximal branches of the posterior cerebral arteries  appear patent. No aneurysm or high flow vascular malformation is  identified.    CT ANGIOGRAM NECK FINDINGS:   Normal origin of the great vessels from the aortic arch.     Right carotid artery: The right common and internal carotid arteries  are patent. No significant stenosis or atherosclerotic disease in the  carotid artery.     Left carotid artery: The left common and internal carotid arteries are  patent. No significant stenosis or atherosclerotic disease in the  carotid artery.     Vertebral arteries: Vertebral arteries are patent without evidence of  dissection. Mildly tortuous vertebral arteries. No flow-limiting  stenosis.     Other findings: Multiple thyroid nodules, with at least some measuring  1.5 cm or greater.  Mildly prominent scattered upper to mid cervical  lymph nodes, nonspecific, but possibly reactive. Degenerative changes  noted in the cervical spine.      Impression    IMPRESSION:    CTA HEAD:  1. Right carotid terminus occlusion. Right middle cerebral artery M1  segment occlusion with poor opacification of the more distal right MCA  branches/poor collateral flow.  2. Nonopacification of the A1 segment of the right anterior cerebral  artery, with reconstitution of flow into the right [id number]-mr-efkmui A1,  A2, and more distal right anterior cerebral artery branches likely due  to collateral flow across a patent anterior communicating artery.  3. Otherwise, no significant stenosis or occlusion of the major  intracranial arteries.    CTA NECK:  1. No significant stenosis or occlusion of the cervical carotid or  vertebral arteries.  2. Multiple thyroid nodules, some of which measure 1.5 cm or greater  in size. Recommend follow-up thyroid ultrasound.    Findings from the noncontrast head CT and CTA head and neck discussed  by myself by phone with Dr. Russo at approximately 12:50 PM on  7/13/2022.      NAHOMY MAST MD         SYSTEM ID:  [id number]   XR Chest Port 1 View    Narrative    XR CHEST PORT 1 VIEW   7/13/2022 2:21 PM     HISTORY: post intubation    COMPARISON: CT 11/6/2021      Impression    IMPRESSION: Cardiomediastinal silhouette is mildly enlarged. Placement  of endotracheal tube with tip approximately 4 cm above the elbret.  Nasogastric tube courses below the diaphragm, tip beyond the field of  view. There is pulmonary vascular congestion and likely moderate  pulmonary edema. No definite pleural effusion or pneumothorax. Left  inferior lung is not well visualized to overlying hyperdense  structure, likely external to the patient. No acute bony abnormality.    SHARONA VELAZQUEZ MD         SYSTEM ID:  [id number]   IR Carotid Cerebral Angiogram Bilateral    Narrative    LUCIANO JIANG  KAREN  [id number]  1945    History: 77 year old female with?past medical history of hypertension,  hyperlipidemia, morbid obesity presenting to the [facility] ED with  proximal right M1 occlusion.s/p TNK presenting for emergent stroke  thrombectomy.?    Indication for the procedure: As above     [place]: DELMY Padilla  Anesthesia: Local and general anesthesia  Contrast used: 48 cc of VISI 320  Fluoro time: 90 minutes, 1849.44 mGy  Sedation time: Not applicable   Sedatives: PTA Propofol gtt  Other medications: Radial artery cocktail (Heparin 3000 IU + 200mcg  Nitroglycerine + 2.5mg Verapamil)   ?  Procedure:  1.  Emergent cerebral angiography and interpretation of the images.  2.  Ultrasound guided right radial arteriotomy with permanent image of  the anatomy stored in the electronic medical record.  3.  Selective catheterization and diagnostic cerebral angiography of  the right internal carotid artery.  4.  Mechanical thrombectomy of right middle cerebral artery.   5.  Percutaneous closure of right radial arteriotomy using a TR band.    Consent: The risks, benefits of a conventional diagnostic cerebral  angiography were discussed with the patient who agreed to proceed. The  risks, which were discussed included risk of stroke, arterial  dissection, wrist/groin hematoma, arteriovenous fistula in the  wrist/groin and pseudoaneurysm of the artery at the arteriotomy site.  Subsequently, verbal and written informed consent was obtained.    Technique: The patient was brought to the angiography suite and placed  in supine position. The medications were administered by the radiology  nursing staff. The nursing staff independently monitored the patient's  vital signs during the procedure.    The patient 's right wrist was prepped and draped in standard fashion.  The right radial ?artery was palpated. Ultrasound was used to image  the radial artery. The right radial artery was shown to be patent.  Lidocaine was  injected locally. Using real-time ultrasound guidance, a  21 gauge micropuncture needle was placed into the right radial artery  which was exchanged for a 6French Slender Terumo sheath over a  0.018inch guidewire. The sheath was connected to a continuous flush of  heparinized saline. A hard copy was stored in the patient's record.  Through the 6 Zimbabwean short sheath, we now advanced a 6 Zimbabwean  benchmark guide catheter over a beacon tip Trejo 2 diagnostic  catheter and a 0.035 inch Terumo Glidewire. ?The guide catheter was  now advanced over the diagnostic catheter/Glidewire into the right  common and subsequently the right internal carotid arteries. ?The  diagnostic catheter and Glidewire were now removed. ?The guide  catheter was back flushed and connected to a continuous flush of  heparinized saline. ?Double flush technique was used throughout the  procedure. ?Under fluoroscopic guidance using roadmap technique, we  now advanced a 5 Zimbabwean Domi distal access catheter over a marksman  microcatheter and a 0.014 inch Synchro 2 standard microwire into the  right middle cerebral artery. ?The microwire was now removed and  microcatheter angiography was performed to confirm the positioning of  her microcatheter. ?We then proceeded to perform our first pass of  mechanical thrombectomy. ?A 6 mm X 40 mm Solitaire stent retriever was  advanced through the microcatheter and subsequently retrieved under  negative aspiration using a 60 cc syringe. ?Subsequent angiography  revealed persistent occlusion of the M1 segment of the right middle  cerebral artery. ?At this point, we reintroduced the Domi distal  access catheter over the microcatheter/microwire into the right middle  cerebral artery. ?The microcatheter/microwire were removed and direct  manual aspiration using a 60 cc syringe was performed. ?The Domi  intermediate catheter was now retrieved from the body under negative  aspiration. ?Subsequent angiography  revealed reduced clot burden in  the right M1 MCA as well as improved flow through the right anterior  cerebral artery. ?At this point, we reintroduced  our intermediate  catheter over the microcatheter/microwire as detailed above and  proceeded to perform a third pass of mechanical thrombectomy. ?Once  again, the Domi intermediate catheter was withdrawn from the body  under negative aspiration with subsequent angiography revealing a  partially occlusive thrombus in the right M1 MCA. ?A fourth and final  mechanical thrombectomy pass was performed as detailed above resulting  in complete (mTICI III)?recanalization of the right cerebral  hemisphere.  ?  Findings:  Right subclavian artery injection: Cervical view  With the diagnostic catheter in the right subclavian artery, AP  angiography was performed demonstrating tortuosity of the right  subclavian and brachiocephalic arteries. ?The right common carotid  artery and the right vertebral artery appeared normal in the proximal  segments.  ?  Right internal carotid artery injection: Cervical view  With the 6 Upper sorbian benchmark guide catheter in the proximal right  internal carotid artery, biplane angiography was performed over the  neck in  oblique projection confirming the positioning of our guide  catheter within the right ICA. ?The right external carotid artery and  its visualized branches appear normal.  ?  Right internal carotid artery injection: Cranial view  With our guide catheter in the right internal carotid artery, biplane  angiography was performed over the cranium in AP and lateral  projections. ?The right internal carotid artery is normal in its  cervical, petrous, laceral,?cavernous, ophthalmic and communicating  segments. ?The right internal carotid artery has a abrupt cut off at  its origin with slightly delayed filling of the right anterior  cerebral artery, suggesting a right carotid terminus??right M1  thrombus. ?The right posterior communicating  artery is normal and  demonstrates contrast washout secondary to competitive flow.  ?  Series #6 microcatheter angiography performed via the marksman  microcatheter in the frontal M2 division of the right middle cerebral  artery.  ?  Series #7 cerebral angiography performed through the guide catheter  after first pass of stent?retriever and manual?aspiration mechanical  thrombectomy. ?Nonocclusive vasospasm noted in the cervical segment of  the right internal carotid artery.  ?  Series #8 cerebral angiography performed after second pass of  aspiration-only thrombectomy demonstrating improved recanalization of  the proximal right M1 segment and improved flow through the right  anterior cerebral artery.  ?  Series #9 cerebral angiography performed after third pass of manual  aspiration?only mechanical thrombectomy demonstrating significantly  improved recanalization of the right middle cerebral artery with a  partially occlusive thrombus in the distal right M1 MCA.  ?  Series #10 cerebral angiography of the fourth pass of aspiration?only  mechanical thrombectomy demonstrating?mTICI III?recannulization.  ?  Series #12?cervical angiography at the end of the procedure  demonstrates resolution of vasospasm without any evidence of  dissection in the right internal carotid artery.  ?    Upon completion of the procedure the 5 Wallisian sheath was removed.  Hemostasis was obtained with a TR band. Procedure was completed  without any complications. Patient was then transferred to ICU in  stable condition.    Dr Jaramillo was present for the entire procedure.      Impression    Impression:  Successful mechanical thrombectomy of right M1/carotid-T thrombus with  mTICI III recannulization.     Spencer Bonilla MD   Endovascular Surgical Neuroradiology Fellow  Pager: [phone].   CT Head w/o Contrast    Narrative    EXAM: CT HEAD W/O CONTRAST  LOCATION: [facility]  DATE/TIME: 7/13/2022 4:52  PM    INDICATION: Follow-up right middle cerebral artery stroke status post mechanical thrombectomy.  COMPARISON: 2022.  TECHNIQUE: Routine CT Head without IV contrast. Multiplanar reformats. Dose reduction techniques were used.    FINDINGS:  INTRACRANIAL CONTENTS: No intracranial hemorrhage, extraaxial collection, or mass effect.  No CT evidence of acute infarct. There is low-attenuation more prominent in interval involving the right insular cortex and the right temporal lobe. There does   appear to be better visualization of the right basal ganglia. There is also scattered diffuse low attenuation within the periventricular and subcortical white matter consistent with diffuse small vessel ischemic disease. There is a stable small calcified   presumed meningioma in the posterior left parietal region with the surrounding brain parenchyma unremarkable. The ventricular system, basal cisterns and the cortical sulci are with diffuse volume loss.     VISUALIZED ORBITS/SINUSES/MASTOIDS: No intraorbital abnormality. No paranasal sinus mucosal disease. No middle ear or mastoid effusion.    BONES/SOFT TISSUES: No acute abnormality.      Impression    IMPRESSION:  1.  Early ischemia right insular cortex and right temporal lobe consistent with right middle cerebral artery territory.  2.  No evidence of midline shift or hemorrhage.  3.  Stable diffuse age related changes.   Echocardiogram Complete - For age > 60 yrs   Result Value    LVEF  50%    Narrative    [id number]  [id number]  [id number]  [id number]^CRYSTAL^KEENAN^LOLITA     [facility]  Echocardiography Laboratory  [address] [place] [address]     Name: LUCIANO JONES  MRN: [id number]  : 1945  Study Date: 2022 07:47 AM  Age: 77 yrs  Gender: Female  Patient Location: [facility]  Reason For Study: Cerebrovascular Incident  Ordering Physician: KEENAN ROJAS  Referring Physician: Mnaoj Daley PA-C  Performed By:  Nancy Haley, HAI     BSA: 2.6 m2  Height: 67 in  Weight: 374 lb  HR: 64  BP: 146/73 mmHg  ______________________________________________________________________________  Procedure  Complete Echo Adult. Contrast Optison.  ______________________________________________________________________________  Interpretation Summary     Extremely technically challenging study due to patient body habitus, even with  the use of contrast imaging.     Left ventricular systolic function is mildly reduced. The visual ejection  fraction is estimated at 50%.  Regional wall motion abnormalities cannot be excluded due to limited  visualization.  Right ventricle is not well visualized, however it appears mild-moderately  dilated, and global systolic function is probably mildly reduced.  No significant valvular abnormalities, limited visualization/assessment.  There is no pericaridal effusion present.     There are no prior studies available for comparison.  ______________________________________________________________________________  Left Ventricle  The left ventricle is normal in size. There is normal left ventricular wall  thickness. Left ventricular systolic function is mildly reduced. The visual  ejection fraction is estimated at 50%. Grade I or early diastolic dysfunction.  Regional wall motion abnormalities cannot be excluded due to limited  visualization.     Right Ventricle  The right ventricle is mild to moderately dilated. The right ventricle is not  well visualized. Right ventricle is not well visualized, however it appears  mild-moderately dilated, and global systolic function is probably mildly  reduced.     Atria  The left atrium is mildly dilated. Right atrium not well visualized.     Mitral Valve  The mitral valve is normal in structure and function.     Tricuspid Valve  The tricuspid valve is not well visualized, but is grossly normal. There is  trace tricuspid regurgitation. The right ventricular systolic  pressure is  approximated at 22.8 mmHg plus the right atrial pressure.     Aortic Valve  The aortic valve is not well visualized. Valve morphology is not well  visualized, however it is functioning normally on Doppler interrogation.     Pulmonic Valve  The pulmonic valve is not well seen, but is grossly normal.     Vessels  The aortic root is normal size. Normal size ascending aorta. Dilation of the  inferior vena cava is present with abnormal respiratory variation in diameter.  Positive pressure ventilation.     Pericardium  There is no pericardial effusion.     Rhythm  Sinus rhythm was noted.  ______________________________________________________________________________  MMode/2D Measurements & Calculations  IVSd: 1.1 cm     LVIDd: 4.1 cm  LVIDs: 3.0 cm  LVPWd: 1.1 cm  FS: 28.2 %  LV mass(C)d: 156.7 grams  LV mass(C)dI: 59.4 grams/m2  Ao root diam: 3.1 cm  LA dimension: 4.1 cm  asc Aorta Diam: 3.2 cm  LA/Ao: 1.3  RWT: 0.55     Doppler Measurements & Calculations  MV E max dorina: 57.2 cm/sec  MV A max dorina: 73.2 cm/sec  MV E/A: 0.78  MV dec slope: 206.6 cm/sec2  PA acc time: 0.10 sec  TR max dorina: 238.9 cm/sec  TR max P.8 mmHg  E/E' avg: 10.3  Lateral E/e': 9.9  Medial E/e': 10.7     ______________________________________________________________________________  Report approved by: Morelia Curran 2022 11:12 AM         CT Head w/o Contrast    Narrative    CT SCAN OF THE HEAD WITHOUT CONTRAST   2022 12:14 PM     HISTORY: Right M1 occlusion status post TNK and thrombectomy.    TECHNIQUE:  Axial images of the head and coronal reformations without  IV contrast material. Radiation dose for this scan was reduced using  automated exposure control, adjustment of the mA and/or kV according  to patient size, or iterative reconstruction technique. Dual energy  technique was performed.    COMPARISON: Head CT 2022    FINDINGS:  Acute infarct involving the right middle cerebral artery  distribution has evolved  compared to the prior exam with increased  sulcal effacement and parenchymal swelling corresponding to the  infarcted areas within the right basal ganglia, right insula, right  temporal lobe, and right occipital lobe. There is new partial  effacement of the frontal horn of the right lateral ventricle with  subtle right-to-left midline shift (approximately 3 mm), new compared  to the prior exam. No evidence of hemorrhage. Small dural-based  calcified lesion along the left parietal lobe, likely incidental  meningioma.    The visualized calvarium, tympanic cavities, and mastoid kidneys are  unremarkable. Mild paranasal sinus mucosal thickening.      Impression    IMPRESSION:   1. Evolution of acute infarct involving the right middle cerebral  artery distribution with increased swelling, cortical effacement, and  new mild right-to-left midline shift. Recommend close follow-up.  2. No evidence of hemorrhage.  3. Questionable hypoattenuation involving the bilateral occipital  lobes which could be artifactual (MRI could be performed).  4. Small incidental meningioma along the left parietal lobe.    BRYCE MCKEON MD         SYSTEM ID:  [id number]   CT Head w/o Contrast    Narrative    CT SCAN OF THE HEAD WITHOUT CONTRAST  July 15, 2022 8:14 AM     HISTORY: Stroke follow-up. Altered mental status.    TECHNIQUE: Axial images of the head and coronal reformations without  IV contrast material. Radiation dose for this scan was reduced using  automated exposure control, adjustment of the mA and/or kV according  to patient size, or iterative reconstruction technique.    COMPARISON: Several prior comparisons, most recent head CT 7/14/2022.    FINDINGS: Hypodense infarcts with evidence of regional edema in the  infarcts in the right basal ganglia and right temporal and occipital  regions. These appear similar to head CT 7/14/2022. Persistent mass  effect on the right lateral ventricle. No significant midline shift or  herniation.  Persistent crowding of the basal cisterns. No acute  intracranial hemorrhage appreciated. Partially calcified extra-axial  lesion along the posterior left cerebral convexity likely representing  a meningioma is unchanged.    Small polypoid mucosal lesion in the medial right maxillary sinus. The  bony calvarium and bones of the skull base appear intact.       Impression    IMPRESSION: Evolving right MCA territory infarcts primarily involving  the basal ganglia and right temporo-occipital lobes. There is regional  edema in the areas of infarct and slight mass effect on the right  lateral ventricle. These appear similar to head CT 7/14/2022. No  significant herniation. No hydrocephalus. No new intracranial  hemorrhage.      YOCASTA DENG MD         SYSTEM ID:  [id number]           ==================================================

## 2022-07-15 NOTE — PROGRESS NOTES
"Neuroendovascular Surgery Progress Note    Overnight Events:  No acute events overnight.    Brief HPI:   77 year old female with past medical history of hypertension, hyperlipidemia, morbid obesity presenting to the [facility] ED with sudden onset left-sided weakness.  CT CTA was performed and found to have a right MCA syndrome with occlusion and proximal right M1.  Endovascular team was consulted for emergent stroke thrombectomy.Patient also received TNKase.  Patient had to be intubated prior to procedure due to severe agitation.  Patient is now status post TICI 3 recanalization with 3 passes.     Vitals:  Vital signs:  Temp: 98.8  F (37.1  C) Temp src: Bladder BP: 112/59 Pulse: 70   Resp: 23 SpO2: 97 % O2 Device: Nasal cannula Oxygen Delivery: 2 LPM   Weight: (!) 170.2 kg (375 lb 3.6 oz)  Estimated body mass index is 58.77 kg/m  as calculated from the following:    Height as of 11/6/21: 1.702 m (5' 7\").    Weight as of this encounter: 170.2 kg (375 lb 3.6 oz).    Neuroexam :   Confused, agitated, delirious.  Patient continues to have left-sided weakness.    New images :   CTH: Shows evolving right MCA territory infarcts involving the basal ganglia and right temporal occipital lobes.    Plan :   -Further work-up per stroke team  -Endovascular team will continue to follow patient peripherally    Robinson Ortega MD, MPH  Endovascular Surgical Neuroradiology Fellow  [facility]  Pager: [phone]          "

## 2022-07-15 NOTE — PROGRESS NOTES
Paged hospitalist SUSIE: Patient Cristy Bailey ICU room 350. I saw your order to transition to IMC however that did not generate a transfer order or bed request.  Transfer order acknowledged, called station [phone] to give report. Charge will have RN call when ready. Gave report to Dorota CHEW. Room 701 with flying squad.

## 2022-07-15 NOTE — H&P
[facility]    History and Physical - Hospitalist Service       Date of Admission:  7/13/2022    Assessment & Plan      Cristy Bailey is a 77 year old female with a PMH significant for morbid obesity, hypertension, and hyperlipidemia who presented to [facility] 7/13/22 with complete left-sided paralysis, left-sided facial droop, and difficulty speaking. CTA head remarkable for right MCA occlusion. She was transferred to [facility] 7/13/22 for Thrombectomy.     Right MCA Acute Ischemic stroke s/p thrombectomy and Tenecteplase 7/13/22  Presented to [facility] with complete paralysis, left-sided facial droop, and aphasia. CTA head remarkable for MCA occlusion. Patient was transferred to [facility] and was treated with tenectaplace and thrombectomy. Patient was severely agitated while at [facility], requiring a lot of sedation. There was concern for patient's ability to protect her airway, therefore decision was made by stroke team to proceed with intubation prior to transferring to [facility]. Patient was extubated 7/14/22.  Repeat head CT this morning remarkable for evolving right MCA territory infarcts primarily involving the basal ganglia and right-temporo-occipital lobes. Regional edema in the areas of infarct and slight mass effect in right lateral ventricle. Findings are similar to head CT 7/14/22. I spoke with Tessie Morillo PA-C with stroke neurology who stated starting chemical VTE prophylaxis is ok given patient is more than 24 hours post tenectaplace administration.  - Transfer to Neuro IMC  - Continue telemetry  - Appreciate recommendations from Stroke Neurology colleagues regarding BP paramateres, neuro check frequency, and initiation of VTE prophylaxis  - Speech eval   - PT/OT  - NPO until formal speech eval  - start Sub Q Lovenox for VTE prophylaxis  - AM CBC    Oliguria  Dehydration  Patient had low UOP overnight, and received a 500 ml bolus of LR. UOP during my  evaluation remained low at 10 ml/hr over the last 4 hours. Snyder in place with tea colored urine. Blood pressures have been soft this morning, with SBP 90-low 100s. Patient denies lightheaded or dizziness.  - give additional 500 ml bolus LR  - Continue snyder for I/O monitoring, consider removing this afternoon if UOP improves  - Strict 1-2 hour I/O  - AM BMP    Hx of LLE cellulitis  Was hospitalized in 11/21 for sepsis 2/2 to pneumonia and LLE cellulitis. BLE are patty and edematous, no unilateral leg swelling appreciated. LLE  has patchy areas of erythema, no fluctuance, no painful areas with palpation. Legs warm to touch.  - Monitor WBC and fever curve  - Low threshold to initiate antibiotics if leg becomes cellulitic appearing or develops pain.    Essential hypertension  Not on PTA antihypertensives.   - Follow-up with PCP regarding ongoing management    Hyperlipidemia  LDL 91 this admission.  - Continue Lipitor 40 mg daily    Back pain  Hip Arthritis s/p hx of bilateral hip replacments  Patient has chronic back pain, reports having it over the last 2 years.   - PRN IV Dilaudid 0.1 mg q4h  - Will order PRN PO/rectal tylenol    Morbid Obesity  Body mass index is 58.77 kg/m .     - Encourage weight loss.  - Follow up with PCP regarding ongoing management.        Diet: NPO for Medical/Clinical Reasons Except for: No Exceptions    DVT Prophylaxis: Enoxaparin (Lovenox) SQ  Snyder Catheter: PRESENT, indication: Strict 1-2 Hour I&O  Central Lines: None  Cardiac Monitoring: ACTIVE order. Indication: ICU  Code Status: Full Code      Clinically Significant Risk Factors Present on Admission                     Disposition Plan    TBD pending clinical course     The patient's care was discussed with the RN, patient, patient's spouse, and Attending Dr. Rose..    Telly Cameron NP  Hospitalist Service  [facility]  Securely message with the Vocera Web Console (learn more here)  Text page via [employer]  Paging/Directory         ______________________________________________________________________    Chief Complaint   Acute Ischemic Right MCA Stroke    History is obtained from the patient, her , and chart review.    History of Present Illness   Cristy Bailey is a 77 year old female with a PMH significant for morbid obesity, hypertension, and hyperlipidemia who presented to [facility] 7/13/22 with complete left-sided paralysis, left-sided facial droop, and difficulty speaking. CTA head remarkable for right MCA occlusion. She received TNK and was transferred to [facility] for Thrombectomy.     Patient was sedated with propofol while intubated. Did not require vasopressor support during her ICU stay. Patient was successfully extubated yesterday. Currently on 2L NC.     Today patient is sleepy, arouses to voice. RN reports patient became sleepy after receiving Dilaudid this morning, and Intensivist subsequently decreased dose to 0.1 mg. Patient is oriented to self and place. She denies any headache, vision changes, congestion, cough, SOB, palpitations, chest pain, diaphoresis, lightheadedness, dizziness, nausea, vomiting, abdominal pain, or myalgias. She does endorse having back pain when laying flat, which she reports as chronic over the last 2 years.     Review of Systems    The 10 point Review of Systems is negative other than noted in the HPI or here.     Past Medical History    I have reviewed this patient's medical history and updated it with pertinent information if needed.   Past Medical History:   Diagnosis Date     Arthritis     hip     HTN (hypertension) 1/2010      Leg weakness 3/24/2015     Lyme disease 1980'S AND 2004    lYME DZ LIKE SXS RESPONDED TO ABX       Past Surgical History   I have reviewed this patient's surgical history and updated it with pertinent information if needed.  Past Surgical History:   Procedure Laterality Date     ARTHROPLASTY HIP Right 7/30/2018    Procedure:  ARTHROPLASTY HIP;  Right total hip arthroplasty;  Surgeon: Bry Garcia MD;  Location: [facility] OR     ARTHROPLASTY HIP Left 2019    Procedure: Left total hip arthroplasty ([employer] 60 mm acetabulum with 2 screws; #15 standard neck Synergy stem; +0 neck 36 mm head) (extra difficult case because of her high BMI and deep subcutaneous fatty layer resulting in the operative time at least twice as long from typical operative time);  Surgeon: Chavo Rodriguez MD;  Location: [facility] OR     BIOPSY OF UTERUS LINING  3/2010    negative.      COLONOSCOPY  2/21/10    benign findings. advised 10 yr f/u.      INCISION AND DRAINAGE HIP, COMBINED Right 2018    Procedure: COMBINED INCISION AND DRAINAGE HIP;  Incision and debridement, right hip ;  Surgeon: Bry Garcia MD;  Location: [facility] OR     ORTHOPEDIC SURGERY Right 2018    Right hip I & D 18, Right JENAE, DOS 2018, Dr. Garcia. [facility]     ZZC C-SEC ONLY,PREV C-SEC      c-sec x 3        Social History   I have reviewed this patient's social history and updated it with pertinent information if needed.  Social History     Tobacco Use     Smoking status: Former Smoker     Quit date: 1984     Years since quittin.9     Smokeless tobacco: Never Used   Substance Use Topics     Alcohol use: No     Drug use: No       Family History   I have reviewed this patient's family history and updated it with pertinent information if needed.  Family History   Problem Relation Age of Onset     Cerebrovascular Disease Father         -stroke at age 80     Family History Negative Mother          Diedn her 80's of unknown causes.  Pt otherwise unaware of her health hx.      Unknown/Adopted Mother      Diabetes Brother         (Christian)  of DM complications at age 50.     Alcohol/Drug Brother         Christian     C.A.D. Brother         Christian.      Heart Disease Brother         Christian--MI age 50.     Family History Negative Brother       Family History Negative Brother      Family History Negative Sister      Family History Negative Sister      Family History Negative Son      Family History Negative Daughter      Family History Negative Daughter        Prior to Admission Medications   Prior to Admission Medications   Prescriptions Last Dose Informant Patient Reported? Taking?   ACETAMINOPHEN PO Past Week at prn  Yes Yes   Sig: Take by mouth every 8 hours as needed for pain   albuterol (PROVENTIL) (2.5 MG/3ML) 0.083% neb solution Not Taking at Unknown time  No No   Sig: Take 1 vial (2.5 mg) by nebulization every 6 hours as needed for shortness of breath / dyspnea or wheezing   Patient not taking: Reported on 7/13/2022   clotrimazole (LOTRIMIN) 1 % external cream Unknown at Unknown time  No Yes   Sig: Apply topically 2 times daily      Facility-Administered Medications: None     Allergies   Allergies   Allergen Reactions     No Known Drug Allergies        Physical Exam   Vital Signs: Temp: 98.8  F (37.1  C) Temp src: Bladder BP: 112/59 Pulse: 70   Resp: 23 SpO2: 97 % O2 Device: Nasal cannula Oxygen Delivery: 2 LPM  Weight: 375 lbs 3.57 oz  EXAM:   General:  Non-toxic appearing. Laying in bed with HOB at 45 degrees. Sleepy, arouses to voice. Oriented to time and place.   Skin:  Warm, dry. Jules BLE.   HEENT:  Normocephalic, atraumatic; EOMs grossly intact.  Neck:  Supple. Trachea midline.  Chest:  Breath sounds CTA and no increased work of breathing on room air.  Cardiovascular:  RRR, no rub or murmur. BLE +4 pitting edema.   Abdomen:  Soft, non-tender, non-distended.  Musculoskeletal:  Bilateral lower extremity weakness, LLE slightly more weak than RLE.   Neurological:  Left facial droop, slight left pronator drift.  Psychiatric:  Affect and mood congruent.      Data   Data reviewed today: I reviewed all medications, new labs and imaging results over the last 24 hours. I personally reviewed the head CT image(s) showing Evolving right MCA  territory infarcts primarily involving the basal ganglia and right temporo-occipital lobes. There is regional edema in the areas of infarct and slight mass effect on the right lateral ventricle. These appear similar to head CT 7/14/2022. No significant herniation. No hydrocephalus. No new intracranial hemorrhage.    Recent Labs   Lab 07/15/22  0501 07/14/22  2155 07/14/22  1613 07/14/22  0726 07/14/22  0510 07/13/22  1713 07/13/22  1208   WBC 11.7*  --   --   --  12.8*  --  8.7   HGB 12.5  --   --   --  13.4  --  13.5   MCV 92  --   --   --  93  --  93     --   --   --  235  --  279   INR  --   --   --   --  1.07  --  1.04     --   --   --  138  --  139   POTASSIUM 3.5  --   --   --  3.9  --  3.9   CHLORIDE 110*  --   --   --  108  --  107   CO2 27  --   --   --  23  --  27   BUN 13  --   --   --  19  --  21   CR 0.56  --   --   --  0.57  --  0.67   ANIONGAP 4  --   --   --  7  --  5   ASHIA 7.5*  --   --   --  8.0*  --  8.6   GLC 93 112* 120*   < > 109*   < > 110*    < > = values in this interval not displayed.

## 2022-07-15 NOTE — PLAN OF CARE
Neuro: PERRLA. SULTANA. Weakness to LUE + BLE. Slight left droop + drift. Disoriented to time and situation.   Cardiac: SR.   Pulmonary: LS dim.  GI: Hypoactive BS. NPO.   : Carrasquillo low output.   Skin: Dry flaky. Wound to left lip. Redness in panus. +3 generalized edema. BLE Flakey, patty, moist, scaly.   Lines: 2PIV.   Activity: Bedrest

## 2022-07-15 NOTE — PROGRESS NOTES
"Brief Intensivist Note  Patient with lower UO and possibly on \"dry\" side;  mls IV ordered.    yesenia basilio  July 15, 2022  "

## 2022-07-15 NOTE — PROGRESS NOTES
07/15/22 0950   Quick Adds   Type of Visit Initial PT Evaluation   Living Environment   People in Home friend(s)  (Leo)   Current Living Arrangements house   Home Accessibility wheelchair accessible  (ramped entrance, no stairs in house)   Transportation Anticipated family or friend will provide   Living Environment Comments lives in house with friendLeo who assists with most cares, house is wheelchair accessible. Pt stands with mechanical lift chair, FWW for pivots, wheelchair for house mobility, sleeps in lift chair   Self-Care   Usual Activity Tolerance poor   Current Activity Tolerance poor   Regular Exercise No   Equipment Currently Used at Home grab bar, tub/shower;raised toilet seat;shower chair;walker, rolling;wheelchair, manual   Fall history within last six months yes   Number of times patient has fallen within last six months 1   Activity/Exercise/Self-Care Comment Patient states she primarily uses the manual wheelchair to move in the house but does have a standard walker which she uses when standing or stand-pivot transfers. Patient states she requires assistance from friend Leo for lower body dressing, getting to the toilet/pericares, sponge baths, household chores, transfers, and gait.  FriendLeo is responsible for errands outside the home.   General Information   Onset of Illness/Injury or Date of Surgery 07/13/22   Referring Physician Diony Garcia MD   Patient/Family Therapy Goals Statement (PT) none stated   Pertinent History of Current Problem (include personal factors and/or comorbidities that impact the POC) 77 year old female with HO super morbid obesity and hyperlipidemia presented with complete left-sided paralysis, left-sided facial droop, and difficulty speaking admitted 7/13/2022 for right MCA occlusion.  She was treated with tenecteplase and mechanical thrombectomy, then transferred to the ICU intubated. Extubated 7/14. More movement on the left and generally  stable other than back pain.   Weight-Bearing Status - LUE full weight-bearing   Weight-Bearing Status - RUE full weight-bearing   Weight-Bearing Status - LLE full weight-bearing   Weight-Bearing Status - RLE full weight-bearing   Cognition   Affect/Mental Status (Cognition) confused;agitated   Orientation Status (Cognition) disoriented to;place;situation;time  (oriented to person)   Follows Commands (Cognition) follows one-step commands;75-90% accuracy   Cognitive Status Comments poor attention, agitated during session wanting access to bed controls and calling out for friend Leo frequently   Pain Assessment   Patient Currently in Pain Yes, see Vital Sign flowsheet  (chronic back pain, unable to lay flat)   Integumentary/Edema   Integumentary/Edema Comments severe edema bilateral LEs, hyperkeratosis and skin changes noted   Posture    Posture Comments unable to tolerate supine position d/t back pain   Range of Motion (ROM)   Range of Motion ROM deficits secondary to pain;ROM deficits secondary to weakness   ROM Comment deconditioned at baseline d/t mostly wheelchair bound and morbid obesity   Strength (Manual Muscle Testing)   Strength Comments moving all extremities, L UE/LE anti-gravity with little difference in movement compared to R/L, mostly limited by deconditioning that was present at baseline   Bed Mobility   Comment, (Bed Mobility) Dependent for rolling, unable to perform with Max A x 2. Pt confused and unable to assist d/t agitation and back pain   Transfers   Comment, (Transfers) unable to assess d/t pt's poor tolerance with laying flat and rolling to don lift sling   Gait/Stairs (Locomotion)   Distance in Feet (Required for LE Total Joints) not assessed, pt only takes pivot steps mechanical lift chair <> wheelchair at baseline   Sensory Examination   Sensory Perception patient reports no sensory changes   Coordination   Coordination Comments mild L UE ataxia with finger to nose test   Clinical  Impression   Criteria for Skilled Therapeutic Intervention Yes, treatment indicated   PT Diagnosis (PT) impaired mobility   Influenced by the following impairments weakness, impaired endurance, impaired balance   Functional limitations due to impairments fall risk, decreased activity tolerance   Clinical Presentation (PT Evaluation Complexity) Stable/Uncomplicated   Clinical Presentation Rationale clinical judgement   Clinical Decision Making (Complexity) low complexity   Planned Therapy Interventions (PT) balance training;bed mobility training;transfer training;strengthening;neuromuscular re-education;patient/family education   Anticipated Equipment Needs at Discharge (PT) wheelchair;lift device   Risk & Benefits of therapy have been explained evaluation/treatment results reviewed;care plan/treatment goals reviewed;risks/benefits reviewed;current/potential barriers reviewed;participants voiced agreement with care plan;participants included;patient;friend   PT Discharge Planning   PT Discharge Recommendation (DC Rec) home with assist;Long term care facility   PT Rationale for DC Rec Pt with poor mobility status at baseline; sleeps in mechanical lift chair which she uses to stand and pivots with walker from chair <> wheelchair for house mobility, friend Leo helps with most ADLs and all mobility. Pt currently needing > A x 2 for rolling/bed mob. Will trial PT to assist develop safe mobility/transfer plan and recommend d/c to home with 24/7 assist from friend leo or to LTC. Pt is poor rehab candidate d/t chronic low back pain and overall deconditioning/morbid obesity at baseline.   PT Brief overview of current status dependent with lift   Total Evaluation Time   Total Evaluation Time (Minutes) 30   Physical Therapy Goals   PT Frequency 6x/week  (trial therapy to develop transfer plan and assist with d/c, reduce frequency as needed)   PT Predicted Duration/Target Date for Goal Attainment 07/29/22   PT Goals Bed  Mobility;Transfers;Gait;Stairs   PT: Transfers Maximum assist;Bed to/from chair;Assistive device;Sit to/from stand

## 2022-07-15 NOTE — PLAN OF CARE
Goal Outcome Evaluation:    Plan of Care Reviewed With: patient     Reason for Admission: R MCA stroke, s/p thrombectomy and tnk on 7/13. Transferred to [facility], extubated 7/14.      Cognitive/Mentation: A/Ox1 - only oriented to self  Neuros/CMS: LUE weakness, BLE weakness (L slightly weaker than R). Confused but is able to make needs known and make appropriate conversation.  VS: Stable on 2L - will maintain sats for a time on room air (pt frequently pulls oxygen off), but sats will drop to around 90% eventually without oxygen.  Tele: NSR  GI: Active BS, no BM this shift. Complained of nausea later in the evening, IV Zofran given.  : Carrasquillo patent - clear deborah urine - low output, MD aware.  Pulmonary: LS - audible expiratory wheezing at times. Infrequent productive cough.   Pain: Chronic back pain - IV dilaudid and tylenol given.     Drains/Lines: R and L PIV  Skin: BUE scattered bruising. Red/open areas in panus and skin folds, attempted to keep interdry in place but patient often refuses positioning/cares due to back pain. BLE with yellow peeling/flaking. Will pass on to possibly have WOC consult order placed tomorrow.  Activity: Bedrest/Lift this shift - PT/OT following. W/C bound at home and only pivots occasionally with walker and family assistance.  Diet: Full liquids/mildly thickened liquids. - Spoon fed. Pills crushed in pudding.    Therapies recs: Home with assist vs Long term care facility  Discharge: Pending    Aggression Stoplight Tool: Yellow for confusion    End of shift summary: ICU transfer this afternoon - neuros stable. Complains of back pain - resistant to turns/repos. Frequently calls for pain/help but does not want to be touched or repositioned. IV dilaudid and tylenol given for pain. Difficult to get a good skin assessment as pt is very hard to turn and does not want to be touched - will pass on to rounding to ask about a possible WOC consult.

## 2022-07-15 NOTE — PROGRESS NOTES
CLINICAL SWALLOW EVALUATION       07/15/22 1215   General Information   Onset of Illness/Injury or Date of Surgery 07/13/22   Referring Physician Dr. Rose   Patient/Family Therapy Goal Statement (SLP) Patient would like to drink ginger ale.   Pertinent History of Current Problem PMH significant for morbid obesity, hypertension, and hyperlipidemia who presented to [facility] 7/13/22 with complete left-sided paralysis, left-sided facial droop, and difficulty speaking. CTA head remarkable for right MCA occlusion. She was transferred to [facility] 7/13/22 for Thrombectomy.   General Observations Patient with some visual disturbance vs. hallucination vs. confusion during session, seeing something not there in room.   Past History of Dysphagia None noted per EMR, patient denies hx of dysphagia   Type of Evaluation   Type of Evaluation Swallow Evaluation   Oral Motor   Oral Musculature anomalies present   Structural Abnormalities none present   Mucosal Quality dry   Dentition (Oral Motor)   Dentition (Oral Motor) natural dentition   Facial Symmetry (Oral Motor)   Facial Symmetry (Oral Motor) left side impairment   Left Side Facial Asymmetry moderate impairment   Lip Function (Oral Motor)   Lip Range of Motion (Oral Motor) retraction impairment;protrusion impairment   Lip Strength (Oral Motor) left side;right side;bilateral  (Droop left, minimal weakness right sided  as well)   Protrusion, Lip Range of Motion bilateral;moderate impairment;left side;minimal impairment  (Some generalized weakness)   Retraction, Lip Range of Motion left side;moderate impairment;minimal impairment;right side   Tongue Function (Oral Motor)   Tongue ROM (Oral Motor) protrusion is impaired;lateralization is impaired   Protrusion, Tongue ROM Impairment (Oral Motor) minimal impairment;left side  (Min left deviation)   Jaw Function (Oral Motor)   Jaw Function (Oral Motor) WNL   Cough/Swallow/Gag Reflex (Oral Motor)   Volitional Throat  Clear/Cough (Oral Motor) reduced strength   Volitional Swallow (Oral Motor) mildly delayed   Comment, Cough/Swallow/Gag Reflex (Oral Motor) Back pain with cough, thus, weak cough production seems purposeful for pain avoidance   Vocal Quality/Secretion Management (Oral Motor)   Vocal Quality (Oral Motor) WNL   Secretion Management (Oral Motor) WNL   Comment, Vocal Quality/Secretion Management (Oral Motor) Reports some throat pain   General Swallowing Observations   Respiratory Support (General Swallowing Observations) nasal cannula  (4 L)   Current Diet/Method of Nutritional Intake (General Swallowing Observations, NIS) NPO   Swallowing Evaluation Clinical swallow evaluation   Clinical Swallow Evaluation   Feeding Assistance frequent cues/help required   Additional evaluation(s) completed today No   Clinical Swallow Evaluation Textures Trialed thin liquids;mildly thick liquids;pureed   Clinical Swallow Eval: Thin Liquid Texture Trial   Mode of Presentation, Thin Liquids cup;spoon;self-fed;fed by clinician   Volume of Liquid or Food Presented 4 oz   Oral Phase of Swallow premature pharyngeal entry;effortful AP movement   Oral Residue left lip drooling;right lip drooling   Pharyngeal Phase of Swallow impaired   Diagnostic Statement Oral labial leakage bilateral and delayed cough response, ice chips tolerated without overt Sx of aspiration   Clinical Swallow Eval: Mildly Thick Liquids   Mode of Presentation cup;self-fed   Volume Presented 3 oz   Oral Phase delayed AP movement   Pharyngeal Phase intact   Diagnostic Statement No overt Sx of aspiration   Clinical Swallow Evaluation: Puree Solid Texture Trial   Mode of Presentation, Puree spoon;self-fed;fed by clinician   Volume of Puree Presented 6 tsp   Oral Phase, Puree delayed AP movement;effortful AP movement   Pharyngeal Phase, Puree intact   Diagnostic Statement Some soreness reported with puree (and grimace), no overt Sx of aspiration   Esophageal Phase of Swallow    Patient reports or presents with symptoms of esophageal dysphagia No   Swallowing Recommendations   Diet Consistency Recommendations mildly thick liquids (level 2);full liquid diet   Supervision Level for Intake close supervision needed   Mode of Delivery Recommendations bolus size, small;place food on right side of mouth;no straws   Monitoring/Assistance Required (Eating/Swallowing) stop eating activities when fatigue is present;monitor for cough or change in vocal quality with intake   Recommended Feeding/Eating Techniques (Swallow Eval) maintain upright sitting position for eating;maintain upright posture during/after eating for 30 minutes;set-up and prepare tray   Medication Administration Recommendations, Swallowing (SLP) Crushed or small whole pills with puree or thick liquids   General Therapy Interventions   Planned Therapy Interventions Dysphagia Treatment   Clinical Impression   Criteria for Skilled Therapeutic Interventions Met (SLP Eval) Yes, treatment indicated   SLP Diagnosis Mild to moderate oropharyngeal dysphagia   Risks & Benefits of therapy have been explained evaluation/treatment results reviewed;patient   SLP Discharge Planning   SLP Discharge Recommendation [facility] patient will benefit from intensive, interdisciplinary therapy.  Anticipate will be able to tolerate 3 hours of therapy per day;home with home care speech therapy   SLP Rationale for DC Rec Recommend SLP follow up for both speech and possible cognitive-linguistic intervention, significantly below baseline   SLP Brief overview of current status  Clinical swallow evaluation completed and treatment initiated: recommend mildly thick full liquid diet only when alert and upright for intake, likely 1:1 supervision needed today. Crushed meds or small whole pills in puree at this time.    Total Evaluation Time   Total Evaluation Time (Minutes) 30

## 2022-07-16 NOTE — PLAN OF CARE
[id number]: Pt here with R MCA Stroke. Alert to self, forgetful.  Neuros show slight L facial droop, slight LUE drift, BLE weakness. VSS. Tele SR. Thickened Full liquid diet. Takes pills crushed in pudding. Up with lift. Complains of constant back pain, PRN tylenol and dilaudid given x2, turning pt in bed. Carrasquillo with low output, dark deborah. Pt scoring green on the Aggression Stop Light Tool. Discharge plan pending placement.

## 2022-07-16 NOTE — PROVIDER NOTIFICATION
Text page to Dr. Rose FERREIRA, After you left I noticed a little blood in urine in the tube of catheter, possibly got tugged with a repo, but just wanted to let you know! Thank you!

## 2022-07-16 NOTE — PLAN OF CARE
Goal Outcome Evaluation:    Plan of Care Reviewed With: patient     Overall Patient Progress: improving    Patient is here with Rt MCA stroke s/p thrombectomy.VSS alert to self only.Neuro's intact except BLE weakness ,LUE weak  . Chronic pain managed with ice pack,tylenol..Tele NSR  .On full liquid diet.  +3 edema in BLE doppler used for checking pulse Up with lift patient refused turning and reposition every 2 hour and also skin  unable to check back skin because patient refused to move the side writer explained about the importance of skin check and turn frequently. Blanchable redness in Rt hip area , bruised ecchymotic skin in BLE shin , skin excoriation in abdominal folds patient refused intervention. Carrasquillo intact with low urine out put total 180 ml  This shift MD aware.Plan to order a WOC consult in the morning and discharge pending to home Vs Long term care

## 2022-07-16 NOTE — PROGRESS NOTES
[facility]    Stroke Progress Note    Interval EventsStable neuro exam.  Following commands and A&O x2.  Patient is interested in LINDA trial.     HPI Summary  Cristy Bailey is a 77 year old female with HTN, cognitive decline at baseline per daughter, wheelchair use at baseline with ADL assistance from  at home.      She presented to [facility] ED 7/13/22 with R gaze deviation and L hemiparesis. Found to have a R carotid terminus/M1 occlusion s/p TNK and transferred to [facility] s/p thrombectomy TICI 3. Unable to obtain MRI secondary to body habitus. Repeat CTH with some mild increased swelling with cortical effacement and new mild R to L midline shift.         Stroke Evaluation Summarized     MRI/Head CT Unable to complete MRI.   Repeat CTH:  1. Evolution of acute infarct involving the right middle cerebral artery distribution with increased swelling, cortical effacement, and new mild right-to-left midline shift. Recommend close follow-up.  2. No evidence of hemorrhage.  3. Questionable hypoattenuation involving the bilateral occipital lobes which could be artifactual (MRI could be performed).  4. Small incidental meningioma along the left parietal lobe.   Intracranial Vasculature CTA head:  1. Right carotid terminus occlusion. Right middle cerebral artery M1 segment occlusion with poor opacification of the more distal right MCA  branches/poor collateral flow.  2. Nonopacification of the A1 segment of the right anterior cerebral artery, with reconstitution of flow into the right [id number] A1, A2, and more distal right anterior cerebral artery branches likely due  to collateral flow across a patent anterior communicating artery.  3. Otherwise, no significant stenosis or occlusion of the major intracranial arteries.     Cervical Vasculature CTA neck:  1. No significant stenosis or occlusion of the cervical carotid or vertebral arteries.  2. Multiple thyroid nodules, some of which  "measure 1.5 cm or greater in size. Recommend follow-up thyroid ultrasound.        Echocardiogram EF 50%, Grade I diastolic dysfunction, LA mildly dilated   EKG/Telemetry Sinus with first degree AVB   Other Testing Not Applicable      LDL  7/13/2022: 91 mg/dL   A1C  7/13/2022: 5.5 %   Troponin No lab value available in past 48 hrs            Impression   Large acute ischemic stroke of R MCA territory with R carotid terminus-M1 occlusion and possible occlusion of the R A1 segment with cerebral edema, cortical effacement and mild R to L midline shift s/p TNK and mechanical thrombectomy (TICI 3), suspect due to embolic stroke of undetermined source (ESUS)      Plan  - Neuro checks and mohamud signs every 4 hours  - Long term BP goal normotension <140/90 or lower for overall cardiovascular health  - Continue  mg daily for secondary stroke prevention  - Continue Lipitor 40 mg daily, goal LDL 40-70, <40 increases risk of ICH, titrate outpatient with PCP  - Continue inpatient telemetry, discharge with 30 day cardiac event monitor to evaluate for atrial fibrillation (ordered)  - Euthermia, euglycemia, eunatremia  - PT/OT/SPT as tolerated  - Blood glucose monitoring, A1c 5.5%, at goal <7%   - please page stroke team with any acute neuro change         Patient Follow-up    - in the next 1-2 week(s) with PCP  - in 6-8 weeks with [facility] or other local neurologist of patient's choice    We will continue to follow.     ISRAEL Samuels, CNP  Vascular Neurology  To page me or covering stroke neurology team member, click here: AMCOM   Choose \"On Call\" tab at top, then search dropdown box for \"Neurology Adult\", select location, press Enter, then look for stroke/neuro ICU/telestroke.    ______________________________________________________    Clinically Significant Risk Factors Present on Admission                 Medications   Scheduled Meds    aspirin  325 mg Oral Daily     atorvastatin  40 mg " Oral QPM     enoxaparin ANTICOAGULANT  40 mg Subcutaneous Q12H       Infusion Meds    - MEDICATION INSTRUCTIONS -       sodium chloride 75 mL/hr at 07/16/22 0630       PRN Meds  acetaminophen, hydrALAZINE, HYDROmorphone, labetalol, lidocaine 4%, lidocaine (buffered or not buffered), - MEDICATION INSTRUCTIONS -, naloxone **OR** naloxone **OR** naloxone **OR** naloxone, ondansetron **OR** ondansetron       PHYSICAL EXAMINATION  Temp:  [97.2  F (36.2  C)-98.9  F (37.2  C)] 98.9  F (37.2  C)  Pulse:  [66-79] 66  Resp:  [11-20] 15  BP: ()/(51-79) 135/59  FiO2 (%):  [2 %] 2 %  SpO2:  [94 %-99 %] 99 %      General Exam  General:  patient lying in bed without any acute distress    HEENT:  normocephalic/atraumatic  Pulmonary:  no respiratory distress      Neuro Exam  Mental Status:  alert, oriented x 2, follows commands, speech clear and fluent, naming and repetition normal  Cranial Nerves:  visual fields intact, PERRL, EOMI with normal smooth pursuit, facial sensation intact and symmetric, hearing not formally tested but intact to conversation, palate elevation symmetric and uvula midline, no dysarthria, shoulder shrug strong bilaterally, tongue protrusion midline, L facial droop  Motor:  normal muscle tone and bulk, able to move all limbs spontaneously, no pronator drift, B/L LE effort against gravity present, fall to bed   Reflexes:  toes down-going  Sensory:  light touch sensation intact and symmetric throughout upper and lower extremities, no extinction on double simultaneous stimulation   Coordination:  No ataxia out of propertion to weakness  Station/Gait:  deferred    Stroke Scales    NIHSS  1a. Level of Consciousness 0-->Alert, keenly responsive   1b. LOC Questions 1-->Answers one question correctly   1c. LOC Commands 0-->Performs both tasks correctly   2.   Best Gaze 0-->Normal   3.   Visual 0-->No visual loss   4.   Facial Palsy 1-->Minor paralysis (flattened nasolabial fold, asymmetry on smiling)   5a.  Motor Arm, Left 0-->No drift, limb holds 90 (or 45) degrees for full 10 secs   5b. Motor Arm, Right 0-->No drift, limb holds 90 (or 45) degrees for full 10 secs   6a. Motor Leg, Left 2-->Some effort against gravity, leg falls to bed by 5 secs, but has some effort against gravity   6b. Motor Leg, right 2-->Some effort against gravity, leg falls to bed by 5 secs, but has some effort against gravity   7.   Limb Ataxia 0-->Absent   8.   Sensory 0-->Normal, no sensory loss   9.   Best Language 0-->No aphasia, normal   10. Dysarthria 0-->Normal   11. Extinction and Inattention  0-->No abnormality   Total 6 (07/16/22 0906)       Imaging  I personally reviewed all imaging; relevant findings per HPI.     Lab Results Data   CBC  Recent Labs   Lab 07/15/22  0501 07/14/22  0510 07/13/22  1208   WBC 11.7* 12.8* 8.7   RBC 4.35 4.65 4.66   HGB 12.5 13.4 13.5   HCT 39.8 43.0 43.4    235 279     Basic Metabolic Panel    Recent Labs   Lab 07/15/22  0501 07/14/22  2155 07/14/22  1613 07/14/22  0726 07/14/22  0510 07/13/22  1713 07/13/22  1208     --   --   --  138  --  139   POTASSIUM 3.5  --   --   --  3.9  --  3.9   CHLORIDE 110*  --   --   --  108  --  107   CO2 27  --   --   --  23  --  27   BUN 13  --   --   --  19  --  21   CR 0.56  --   --   --  0.57  --  0.67   GLC 93 112* 120*   < > 109*   < > 110*   ASHIA 7.5*  --   --   --  8.0*  --  8.6    < > = values in this interval not displayed.     Liver Panel  No results for input(s): PROTTOTAL, ALBUMIN, BILITOTAL, ALKPHOS, AST, ALT, BILIDIRECT in the last 168 hours.  INR    Recent Labs   Lab Test 07/14/22  0510 07/13/22  1208 09/04/19  0000   INR 1.07 1.04 3.4*      Lipid Profile    Recent Labs   Lab Test 07/13/22  1853   CHOL 163   HDL 47*   LDL 91   TRIG 125     A1C    Recent Labs   Lab Test 07/13/22  1853 03/24/15  0827   A1C 5.5 5.9     Troponin I    Recent Labs   Lab 07/13/22  1208   TROPONINIS 5       Billing: I have personally spent a total of 45 minutes providing  care today, time spent in reviewing medical records and reviewing tests, examining the patient and obtaining history, coordination of care, and discussion with the patient and/or family regarding diagnostic results, prognosis, symptom management, risks and benefits of management options, and development of plan of care. Greater than 50% was spent in counseling and coordination of care.

## 2022-07-16 NOTE — PROGRESS NOTES
[facility]    Internal Medicine Hospitalist Progress Note  07/16/2022  I evaluated patient on the above date.    Shaka Rose Jr., MD  [phone] (p)  Text Page  Vocera        Assessment & Plan New actions/orders today (07/16/2022) are underlined.    Cristy Bailey is a 77 year old female with a PMH significant for morbid obesity, hypertension (not on/needing meds), and hyperlipidemia (not on/needing meds), who presented to [facility] 7/13/2022 with complete left-sided paralysis, left-sided facial droop, and difficulty speaking. CTA head remarkable for right MCA occlusion. She was given tenecteplase and subsequently transferred to [facility] 7/13/2022 for thrombectomy.       Acute right MCA ischemic stroke with edema and right to left midline shift.  S/p tenecteplase and subsequent R MCA mechanical thrombectomy 7/13/2022.  * Presented to [facility] 7/13 with complete paralysis, left-sided facial droop, and aphasia. Code stroke initiated. Head CT 7/13 showed signs of an evolving R MCA infarct. CTA head 7/13 showed a right carotid terminus occlusion, right middle cerebral artery M1  segment occlusion with poor opacification of the more distal right MCA branches/poor collateral flow. CTA neck 7/13 negative for acute occlusions. Pt given tenecteplase 7/13 at 13:11. Noted to be agitated and was subsequently intubated given need for procedure.   * Subsequently transferred to [facility] 7/13/2022 where she underwent above procedure.   * 7/14: Extubated. Head CT 7/14 showed evolution of acute infarct involving the R MCA distribution with increased swelling, cortical effacement, and new mild right-to-left midline shift; questionable hypoattenuation involving the bilateral occipital lobes which could be artifactual. Echo 7/14 showed LVEF 50%, grade 1 diastolic dysfunction; RV not well visualized but appeared mild-moderately dilated with global systolic function probably mildly reduced.  Started ASA and atorvastatin.   * 7/15: Head CT 7/15 showed evolving R MCA stroke with areas of edema, overall stable.  - Continue to monitor on telemetry.  - Continue ASA, atorvastatin.  - Continue PT, OT.  - Management of dysphagia as below.    Dysphagia due to CVA.  FEN.  Hypovolemia with oliguria.  * Seen by SLP and noted with dysphagia.  * 7/15: Patient had low UOP overnight, and received a 500 ml bolus. UO continued to be low and given more fluids.   * 7/16: Still with low UO with concentrated urine.  - Continue mildly thickened full liquid diet; advance diet as able per SLP.  - Increase NS 75 ml/hr --> 150 ml/hr for 18h, then re-assess.    Bilateral lower extremity chronic venous stasis dermatitis with chronic skin changes.  Hx of LLE cellulitis.  * Was hospitalized in 11/2021 for sepsis 2/2 to pneumonia and LLE cellulitis.   * 7/15: BLE noted to be patty and edematous, no unilateral leg swelling appreciated; LLE had patchy areas of erythema, no fluctuance, no painful areas with palpation; legs warm to touch.  - Monitor clinically for signs of infection.  - Keep bilateral lower extremities elevated while in bed or sitting.    H/o essential hypertension.  * Not on/needing meds PTA.   * BP's controlled here.  * 7/16: BP's soft in the 90's systolic.  - Monitor BP's.  - Continue PRN IV hydralazine and PRN IV labetalol per protocol.    DLD.  * FLP 7/13: , HDL 47, LDL 91, .   * Started on atorvastatin this hospitalization.  - Continue atorvastatin 40 mg daily.    Lower back wound, suspect from pressure type injury.  Acute lower back pain, ?related to lower back wound.  OA s/p bilateral hip replacements and h/o back pain.  * MRI L-spine in 2018 showed multilevel degenerative changes with mild central stenosis. Patient has chronic back pain, reports having it over the last 2 years.   * Pt with significant back pain in the ICU requiring PRN IV hydromorphone.  * PRN hydromorphone decreased 7/15 due to  somnolence.  * On 7/16, noted with 5-6 cm by 2-3 cm wound with some swelling/fluid/blistering in a fold of her lower back, did not appear infected; this seemed to be the source of her pain.  - Place padded dressing over the wound.  - WOC RN consult.  - Continue regular repositioning.  - Continue PRN acetaminophen, PRN IV hydromorphone 0.1 mg q4h; minimize opioids as able.    Suspected meningioma left parietal region, incidentally seen on CT.  * Head CT 7/13 also showed a calcified extra-axial mass overlying the left parietal region measuring 1.4 cm, potentially representing a meningioma.  - Follow-up serial monitoring outpatient.    Anemia, suspect dilutional component.  * Hgb normal on admit.   * Hgb 11.5 on 7/16. No overt clinical signs of major bleeding.  Recent Labs   Lab 07/16/22  0749 07/15/22  0501 07/14/22  0510 07/13/22  1208   HGB 11.5* 12.5 13.4 13.5   - Monitor CBC.    Morbid obesity.  Body mass index is 59.11 kg/m .  - Needs to pursue aggressive dietary and lifestyle modifications.      Clinically Significant Risk Factors Present on Admission                       COVID-19 testing.  COVID-19 PCR Results    COVID-19 PCR Results 11/6/21 11/18/21 7/13/22   SARS CoV2 PCR Negative Negative Negative      Comments are available for some flowsheets but are not being displayed.         COVID-19 Antibody Results, Testing for Immunity    COVID-19 Antibody Results, Testing for Immunity   No data to display.             Diet: Combination Diet Full Liquid; Mildly Thick (level 2)    Prophylaxis: PCD's, ambulation. Enoxaparin (started 7/15).  Carrasquillo Catheter: PRESENT, indication: Retention  Central Lines: None  Code Status: Full Code    Disposition Plan   Expected discharge: 2-3d recommended to transitional care unit pending above.  Entered: Shaka Rose MD 07/16/2022, 12:28 PM         Interval History   More awake and alert.  C/o back pain; notes this is localized more on the skin around a lower back skin  wound.    -Data reviewed today: I reviewed all new labs and imaging over the last 24 hours. I personally reviewed no images or EKG's today.    Physical Exam    , Blood pressure 96/57, pulse 69, temperature 98.9  F (37.2  C), temperature source Axillary, resp. rate 18, weight (!) 171.2 kg (377 lb 6.8 oz), SpO2 99 %, not currently breastfeeding. O2 Device: None (Room air) Oxygen Delivery: 2 LPM  Vitals:    07/14/22 0200 07/15/22 0522 07/16/22 0500   Weight: (!) 170 kg (374 lb 12.5 oz) (!) 170.2 kg (375 lb 3.6 oz) (!) 171.2 kg (377 lb 6.8 oz)     Vital Signs with Ranges  Temp:  [97.2  F (36.2  C)-98.9  F (37.2  C)] 98.9  F (37.2  C)  Pulse:  [66-79] 69  Resp:  [11-20] 18  BP: ()/(51-79) 96/57  FiO2 (%):  [2 %] 2 %  SpO2:  [94 %-99 %] 99 %  Patient Vitals for the past 24 hrs:   BP Temp Temp src Pulse Resp SpO2 Weight   07/16/22 1000 96/57 -- -- 69 18 99 % --   07/16/22 0800 93/68 -- -- 71 11 98 % --   07/16/22 0700 135/59 98.9  F (37.2  C) Axillary 66 15 99 % --   07/16/22 0600 135/59 97.5  F (36.4  C) Oral -- 16 99 % --   07/16/22 0500 -- -- -- -- -- -- (!) 171.2 kg (377 lb 6.8 oz)   07/16/22 0400 119/58 97.5  F (36.4  C) Oral -- 14 97 % --   07/16/22 0330 -- -- -- -- 14 -- --   07/16/22 0256 -- -- -- -- 18 -- --   07/16/22 0212 131/65 -- -- -- 18 96 % --   07/16/22 0046 135/67 97.2  F (36.2  C) Oral -- 20 99 % --   07/15/22 2240 117/79 -- -- 79 -- 94 % --   07/15/22 2030 112/55 98.8  F (37.1  C) Oral 75 -- -- --   07/15/22 2000 98/60 -- -- 69 -- 96 % --   07/15/22 1730 105/55 -- -- 67 20 98 % --   07/15/22 1526 110/51 98.8  F (37.1  C) Oral 72 18 95 % --   07/15/22 1300 106/59 -- -- 78 16 95 % --     I/O's Last 24 hours  I/O last 3 completed shifts:  In: 2065 [I.V.:1565; IV Piggyback:500]  Out: 450 [Urine:450]    Constitutional: Awake, alert, more conversant.  Respiratory: Diminished in bases. No crackles or wheezes.  Cardiovascular: RRR, no m/r/g.  GI: Obese, soft, nt, +BS.  Skin/Integumen: Bilateral lower  extremity chronic venous stasis changes, 1+ pitting edema into thighs.  Other:  5-6 cm by 2-3 cm wound with some swelling/fluid/blistering in a fold of her lower back, did not appear infected.            Data   Recent Labs   Lab 07/16/22  0749 07/15/22  0501 07/14/22 2155 07/14/22  0726 07/14/22  0510 07/13/22  1713 07/13/22  1208   WBC 9.2 11.7*  --   --  12.8*  --  8.7   HGB 11.5* 12.5  --   --  13.4  --  13.5   MCV 94 92  --   --  93  --  93    238  --   --  235  --  279   INR  --   --   --   --  1.07  --  1.04    141  --   --  138  --  139   POTASSIUM 3.5 3.5  --   --  3.9  --  3.9   CHLORIDE 112* 110*  --   --  108  --  107   CO2 27 27  --   --  23  --  27   BUN 14 13  --   --  19  --  21   CR 0.51* 0.56  --   --  0.57  --  0.67   ANIONGAP 5 4  --   --  7  --  5   ASHIA 7.7* 7.5*  --   --  8.0*  --  8.6   GLC 93 93 112*   < > 109*   < > 110*   TROPONINIS  --   --   --   --   --   --  5    < > = values in this interval not displayed.     Recent Labs   Lab Test 07/16/22  0749 07/15/22  0501 07/14/22 2155 07/14/22  1613 07/14/22  1247   GLC 93 93 112* 120* 102*     Recent Labs   Lab 07/16/22  0749 07/15/22  0501 07/14/22  0510 07/13/22  1208   WBC 9.2 11.7* 12.8* 8.7         No results found for this or any previous visit (from the past 24 hour(s)).    Medications   All medications were reviewed.    - MEDICATION INSTRUCTIONS -       sodium chloride 75 mL/hr at 07/16/22 1000       aspirin  325 mg Oral Daily     atorvastatin  40 mg Oral QPM     enoxaparin ANTICOAGULANT  40 mg Subcutaneous Q12H     sodium phosphate  15 mmol Intravenous Once     acetaminophen, hydrALAZINE, HYDROmorphone, labetalol, lidocaine 4%, lidocaine (buffered or not buffered), - MEDICATION INSTRUCTIONS -, naloxone **OR** naloxone **OR** naloxone **OR** naloxone, ondansetron **OR** ondansetron

## 2022-07-16 NOTE — PLAN OF CARE
Goal Outcome Evaluation:    Plan of Care Reviewed With: patient     Reason for Admission: R MCA stroke, s/p thrombectomy and tnk on 7/13.         Cognitive/Mentation: A/Ox1 - only oriented to self, frequently calls out for help and asks for Leo.    Neuros/CMS: LUE weakness, BLE weakness (L slightly weaker than R). Confused but is able to make needs known and make appropriate conversation.  VS: Stable on RA - soft BPs today, MD aware  Tele: NSR  GI: Active BS, no BM this shift.   : Carrasquillo patent - deborah urine - low output, MD aware, IVF increased today   Pulmonary: LS - audible expiratory wheezing at times. Infrequent productive cough.   Pain: Chronic back pain - tylenol given.      Drains/Lines: New R hand PIV this evening  Skin: BUE scattered bruising. Red/open areas in panus and skin folds, patient often refuses positioning/cares due to back pain. BLE with yellow peeling/flaking. WOC ordered today  Activity: Bedrest/Lift this shift - PT/OT following. W/C bound at home and only pivots occasionally with walker and family assistance.  Diet: Full liquids/mildly thickened liquids. - Spoon fed. Pills crushed in pudding.     Therapies recs: Home with assist vs Long term care facility  Discharge: Pending     Aggression Stoplight Tool: Yellow for confusion     End of shift summary: Neuros stable. Complains of back pain - resistant to turns/repos. Frequently calls for pain/help but does not want to be touched or repositioned. Tylenol given for pain. Difficult to get a good skin assessment as pt is very hard to turn and does not want to be touched. Called 911 tonight and told them she needed help, frequently calling out for Leo. Discharge pending final plan of home vs long term care.

## 2022-07-16 NOTE — PROVIDER NOTIFICATION
Urine output continues to be low today (150cc from 4300-1069), urine is still dark. SBP . Notified hospitalist to inquire about the need for additional fluid bolus, spoke with Steffen Dickerson NP. No new orders at this time.

## 2022-07-17 NOTE — PLAN OF CARE
Goal Outcome Evaluation:  Plan of Care Reviewed With: patient, other (see comments)   Overall Patient Progress: improving    Pt here with R MCA stroke, s/p TNK and thrombectomy, 7/13. Pt A&O 1-2, confused to time and situation, intermittent confusion to place, very forgetful, redirects. Intermittent levi HR, elevated BP within parameters, otherwise VSS, on RA, sating low 90's. LS clear. Infrequent productive cough. Tele SB w/ 1st degree AVB. Pain managed with repositioning, ice pack and PRN Tylenol. CMS intact except for BLE numbness, and +3 to +4 edema, IV Lasix given, patty, lower extremities yellow scaling/peeling/flaking, blistering, redness, LLE Mepilex CDI. Neuro's intact except for LUE slight pronator dirft and weakness, 4/5 strength, BLE weakness, 3/5 strength with LLE slightly weaker than right, L facial droop. A2-3 for T&R, lift, encouragement and education given with the importance to allow to be T&R, up to chair today A3. +BS, no BM this shift. Neida hopper MD aware of low urine output previous shift, Strict I&O's, IV lasix given for lower extremity edema. Tolerating Full mildly thick liquid diet, tolerated pills whole with water. WOC consulted, unable to assess back skin and complete full skin assessment, patient refusing d/t pain. Phos replaced, lab redraw scheduled for tomorrow AM. SL. Discharge with cardio event monitor. Pt scoring green on Aggression Stop Light Tool. Discharge pending.

## 2022-07-17 NOTE — PROVIDER NOTIFICATION
Writer paged Dr. Rose Could you places rational for Carrasquillo cath as Strick I&O's, been having low UO, they increased IVF early AM to 150ml/hr, will continue to monitor of course, do we need to adjust BP parameters for patient. Please advise.

## 2022-07-17 NOTE — PLAN OF CARE
Goal Outcome Evaluation:    Plan of Care Reviewed With: patient     Overall Patient Progress: improving    Patient is here with Rt MCA stroke s/p thrombectomy and TNK.VSS alert to self only.Neuro's intact except  Lt droop BLE weakness ,LUE weak and mild drift  . Chronic pain managed with ice pack,tylenol.Tele SR with first degree AV block .On full liquid diet mildly thick. +3 edema in BLE doppler used for checking pulse Up with lift patient refused turning and reposition every 2 hour and  full skin  unable to check back skin  . Blanchable redness in Rt hip area  foam dressing in place , skin peeling  in BLE shin  mepilex changed in LLE , redness & skin excoriation in abdominal folds inter dry applied. Carrasquillo intact with low urine out put total 150 ml  This shift MD aware. discharge pending to home Vs Long term care

## 2022-07-17 NOTE — PLAN OF CARE
Here for R MCA. A&O x2-3.  Neuros intact x L sided facial droop, L pronator drift. 4+ dependent BLE edema. Carrasquillo in place. VSS on RA. Up w/ Ax2-3 w lift. Tele: NSR/SB. Repositioned frequently d/t back pain. Tolerating pureed diet, mildly thick liquids. Alarms on. Discharge pending.

## 2022-07-17 NOTE — PROGRESS NOTES
[facility]    Internal Medicine Hospitalist Progress Note  07/17/2022  I evaluated patient on the above date.    Shaka Rose Jr., MD  [phone] (p)  Text Page  Vocera        Assessment & Plan New actions/orders today (07/17/2022) are underlined.    Cristy Bailey is a 77 year old female with a PMH significant for morbid obesity, hypertension (not on/needing meds), and hyperlipidemia (not on/needing meds), who presented to [facility] 7/13/2022 with complete left-sided paralysis, left-sided facial droop, and difficulty speaking. CTA head remarkable for right MCA occlusion. She was given tenecteplase and subsequently transferred to [facility] 7/13/2022 for thrombectomy.       Acute right MCA ischemic stroke with edema and right to left midline shift.  S/p tenecteplase and subsequent R MCA mechanical thrombectomy 7/13/2022.  * Presented to [facility] 7/13 with complete paralysis, left-sided facial droop, and aphasia. Code stroke initiated. Head CT 7/13 showed signs of an evolving R MCA infarct. CTA head 7/13 showed a right carotid terminus occlusion, right middle cerebral artery M1  segment occlusion with poor opacification of the more distal right MCA branches/poor collateral flow. CTA neck 7/13 negative for acute occlusions. Pt given tenecteplase 7/13 at 13:11. Noted to be agitated and was subsequently intubated given need for procedure.   * Subsequently transferred to [facility] 7/13/2022 where she underwent above procedure.   * 7/14: Extubated. Head CT 7/14 showed evolution of acute infarct involving the R MCA distribution with increased swelling, cortical effacement, and new mild right-to-left midline shift; questionable hypoattenuation involving the bilateral occipital lobes which could be artifactual. Echo 7/14 showed LVEF 50%, grade 1 diastolic dysfunction; RV not well visualized but appeared mild-moderately dilated with global systolic function probably mildly reduced.  Started ASA and atorvastatin.   * 7/15: Head CT 7/15 showed evolving R MCA stroke with areas of edema, overall stable.  - Continue to monitor on telemetry.  - Continue  mg daily, atorvastatin 40 mg daily  - Continue PT, OT.  - BP goal < 140/90.  - Plan discharge with 30 day cardiac monitor.  - Management of dysphagia as below.    Dysphagia due to CVA.  FEN.  * Seen by SLP and noted with dysphagia.  * Given boluses for low UO 7/15 and IVF's increased 7/16.  - Continue mildly thickened full liquid diet; advance diet as able per SLP.  - Hold further IVF's for now given edema and concerns for volume overload (see below).    Bilateral lower extremity edema; concern for volume overload/diastolic CHF.  Essential hypertension.  * Not on/needing meds PTA.   * BP's stable initially.  * 7/16: BP's soft in the 90's systolic. Given more IVF's.  * 7/17: BP's more elevated. Pt with significant pedal edema, worse than baseline.  - BP goal < 140/90.  - Try 1 dose furosemide 60 mg IV.  - Continue PRN IV hydralazine and PRN IV labetalol.  - Order lymphedema therapy consult.  - Monitor i/o's, daily wts - continue Carrasquillo for now.    Bilateral lower extremity chronic venous stasis dermatitis with chronic skin changes.  Hx of LLE cellulitis.  * Was hospitalized in 11/2021 for sepsis 2/2 to pneumonia and LLE cellulitis.   * 7/15: BLE noted to be patty and edematous, no unilateral leg swelling appreciated; LLE had patchy areas of erythema, no fluctuance, no painful areas with palpation; legs warm to touch.  - Monitor clinically for signs of infection.  - Keep bilateral lower extremities elevated while in bed or sitting.  - CHF management as noted.    DLD.  * FLP 7/13: , HDL 47, LDL 91, .   * Started on atorvastatin this hospitalization.  - Continue atorvastatin 40 mg daily.    Lower back wound, suspect from pressure type injury.  Acute lower back pain, ?related to lower back wound.  OA s/p bilateral hip replacements and  h/o back pain.  * MRI L-spine in 2018 showed multilevel degenerative changes with mild central stenosis. Patient has chronic back pain, reports having it over the last 2 years.   * Pt with significant back pain in the ICU requiring PRN IV hydromorphone.  * PRN hydromorphone decreased 7/15 due to somnolence.  * On 7/16, noted with 5-6 cm by 2-3 cm wound with some swelling/fluid/blistering in a fold of her lower back, did not appear infected; this seemed to be the source of her pain. Padded dressing placed and WOC RN ordered.  - WOC RN consult pending.  - Continue regular repositioning.  - Continue PRN acetaminophen, PRN IV hydromorphone 0.1 mg q4h; minimize opioids as able.    Suspected meningioma left parietal region, incidentally seen on CT.  * Head CT 7/13 also showed a calcified extra-axial mass overlying the left parietal region measuring 1.4 cm, potentially representing a meningioma.  - Follow-up serial monitoring outpatient.    Anemia, suspect dilutional component.  * Hgb normal on admit.   * Hgb 11.5 on 7/16. No overt clinical signs of major bleeding.  Recent Labs   Lab 07/17/22  0712 07/16/22  0749 07/15/22  0501 07/14/22  0510 07/13/22  1208   HGB 11.7 11.5* 12.5 13.4 13.5   - Monitor CBC.    Intertriginous dermatitis.  - Order clotrimazole powder.    Morbid obesity.  Body mass index is 59.11 kg/m .  - Needs to pursue aggressive dietary and lifestyle modifications.      Clinically Significant Risk Factors Present on Admission                       COVID-19 testing.  COVID-19 PCR Results    COVID-19 PCR Results 11/6/21 11/18/21 7/13/22   SARS CoV2 PCR Negative Negative Negative      Comments are available for some flowsheets but are not being displayed.         COVID-19 Antibody Results, Testing for Immunity    COVID-19 Antibody Results, Testing for Immunity   No data to display.             Diet: Combination Diet Full Liquid; Mildly Thick (level 2)  Room Service    Prophylaxis: PCD's, ambulation. Enoxaparin  (started 7/15).  Carrasquillo Catheter: PRESENT, indication: Strict 1-2 Hour I&O  Central Lines: None  Code Status: Full Code    Disposition Plan   Expected discharge: 2-3d recommended to transitional care unit pending above.  Entered: Shaka Rose MD 07/17/2022, 10:17 AM         Interval History   Pain better today.  Has been refusing turning at times.  No chest pain or dyspnea.  Tolerating liquid diet.    -Data reviewed today: I reviewed all new labs and imaging over the last 24 hours. I personally reviewed no images or EKG's today.    Physical Exam    , Blood pressure (!) 151/84, pulse 61, temperature 97.4  F (36.3  C), temperature source Oral, resp. rate 18, weight (!) 171.2 kg (377 lb 6.8 oz), SpO2 92 %, not currently breastfeeding. O2 Device: None (Room air)    Vitals:    07/14/22 0200 07/15/22 0522 07/16/22 0500   Weight: (!) 170 kg (374 lb 12.5 oz) (!) 170.2 kg (375 lb 3.6 oz) (!) 171.2 kg (377 lb 6.8 oz)     Vital Signs with Ranges  Temp:  [97  F (36.1  C)-98.2  F (36.8  C)] 97.4  F (36.3  C)  Pulse:  [61-68] 61  Resp:  [18-20] 18  BP: ()/(49-84) 151/84  SpO2:  [92 %-95 %] 92 %  Patient Vitals for the past 24 hrs:   BP Temp Temp src Pulse Resp SpO2   07/17/22 0751 (!) 151/84 97.4  F (36.3  C) Oral 61 18 92 %   07/17/22 0626 -- -- -- -- 20 --   07/17/22 0352 134/69 97.3  F (36.3  C) -- -- 20 94 %   07/17/22 0017 137/70 97  F (36.1  C) Oral -- 20 93 %   07/16/22 2100 -- 98.2  F (36.8  C) Axillary -- -- 93 %   07/16/22 1925 97/58 -- -- 68 20 --   07/16/22 1532 98/49 98  F (36.7  C) Oral -- -- 95 %     I/O's Last 24 hours  I/O last 3 completed shifts:  In: 300 [P.O.:300]  Out: 475 [Urine:475]    Constitutional: Awake, alert, pleasant, more conversant.  Respiratory: Diminished in bases. No crackles or wheezes.  Cardiovascular: RRR, no m/r/g.  GI: Obese, soft, nt, +BS.  Skin/Integumen: Bilateral lower extremity chronic venous stasis changes, 1+ pitting edema into thighs, significant pedal edema.  Other:           Data   Recent Labs   Lab 07/17/22  0712 07/16/22  0749 07/15/22  0501 07/14/22  0726 07/14/22  0510 07/13/22  1713 07/13/22  1208   WBC 7.2 9.2 11.7*  --  12.8*  --  8.7   HGB 11.7 11.5* 12.5  --  13.4  --  13.5   MCV 94 94 92  --  93  --  93    220 238  --  235  --  279   INR  --   --   --   --  1.07  --  1.04    144 141  --  138  --  139   POTASSIUM 3.4 3.5 3.5  --  3.9  --  3.9   CHLORIDE 114* 112* 110*  --  108  --  107   CO2 23 27 27  --  23  --  27   BUN 14 14 13  --  19  --  21   CR 0.51* 0.51* 0.56  --  0.57  --  0.67   ANIONGAP 6 5 4  --  7  --  5   ASHIA 7.6* 7.7* 7.5*  --  8.0*  --  8.6   * 93 93   < > 109*   < > 110*   TROPONINIS  --   --   --   --   --   --  5    < > = values in this interval not displayed.     Recent Labs   Lab Test 07/17/22  0712 07/16/22  0749 07/15/22  0501 07/14/22  2155 07/14/22  1613   * 93 93 112* 120*     Recent Labs   Lab 07/17/22  0712 07/16/22  0749 07/15/22  0501 07/14/22  0510 07/13/22  1208   WBC 7.2 9.2 11.7* 12.8* 8.7         No results found for this or any previous visit (from the past 24 hour(s)).    Medications   All medications were reviewed.    - MEDICATION INSTRUCTIONS -         aspirin  325 mg Oral Daily     atorvastatin  40 mg Oral QPM     enoxaparin ANTICOAGULANT  40 mg Subcutaneous Q12H     sodium phosphate  15 mmol Intravenous Once     acetaminophen, hydrALAZINE, HYDROmorphone, labetalol, lidocaine 4%, lidocaine (buffered or not buffered), - MEDICATION INSTRUCTIONS -, naloxone **OR** naloxone **OR** naloxone **OR** naloxone, ondansetron **OR** ondansetron

## 2022-07-17 NOTE — PROGRESS NOTES
[facility]    Stroke Progress Note    Interval EventsStable neuro exam. AA & O to self and place. Significant other Leo updated at bedside. Discussed with patient possible discharge dispositions after hospital course.       HPI Summary  Cristy Bailey is a 77 year old female with HTN, cognitive decline at baseline per daughter, wheelchair use at baseline with ADL assistance from  at home.      She presented to [facility] ED 7/13/22 with R gaze deviation and L hemiparesis. Found to have a R carotid terminus/M1 occlusion s/p TNK and transferred to [facility] s/p thrombectomy TICI 3. Unable to obtain MRI secondary to body habitus. Repeat CTH with some mild increased swelling with cortical effacement and new mild R to L midline shift.         Stroke Evaluation Summarized     MRI/Head CT Unable to complete MRI.   Repeat CTH:  1. Evolution of acute infarct involving the right middle cerebral artery distribution with increased swelling, cortical effacement, and new mild right-to-left midline shift. Recommend close follow-up.  2. No evidence of hemorrhage.  3. Questionable hypoattenuation involving the bilateral occipital lobes which could be artifactual (MRI could be performed).  4. Small incidental meningioma along the left parietal lobe.   Intracranial Vasculature CTA head:  1. Right carotid terminus occlusion. Right middle cerebral artery M1 segment occlusion with poor opacification of the more distal right MCA  branches/poor collateral flow.  2. Nonopacification of the A1 segment of the right anterior cerebral artery, with reconstitution of flow into the right [id number] A1, A2, and more distal right anterior cerebral artery branches likely due  to collateral flow across a patent anterior communicating artery.  3. Otherwise, no significant stenosis or occlusion of the major intracranial arteries.      Cervical Vasculature CTA neck:  1. No significant stenosis or occlusion of the  "cervical carotid or vertebral arteries.  2. Multiple thyroid nodules, some of which measure 1.5 cm or greater in size. Recommend follow-up thyroid ultrasound.         Echocardiogram EF 50%, Grade I diastolic dysfunction, LA mildly dilated   EKG/Telemetry Sinus with first degree AVB   Other Testing Not Applicable      LDL  7/13/2022: 91 mg/dL   A1C  7/13/2022: 5.5 %   Troponin No lab value available in past 48 hrs            Impression   Large acute ischemic stroke of R MCA territory with R carotid terminus-M1 occlusion and possible occlusion of the R A1 segment with cerebral edema, cortical effacement and mild R to L midline shift s/p TNK and mechanical thrombectomy (TICI 3), suspect due to embolic stroke of undetermined source (ESUS)      Plan  - Neuro checks and mohamud signs every 4 hours  - Long term BP goal normotension <140/90 or lower for overall cardiovascular health  - Continue  mg daily for secondary stroke prevention  - Continue Lipitor 40 mg daily, goal LDL 40-70, <40 increases risk of ICH, titrate outpatient with PCP  - Continue inpatient telemetry, discharge with 30 day cardiac event monitor to evaluate for atrial fibrillation (ordered)  - Euthermia, euglycemia, eunatremia  - PT/OT/SPT as tolerated  - Blood glucose monitoring, A1c 5.5%, at goal <7%   - Patient interested in LINDA trial           Patient Follow-up    - in the next 1-2 week(s) with PCP  - in 6-8 weeks with [facility] or other local neurologist of patient's choice      We will continue to follow.     ISRAEL Samuels, CNP  Vascular Neurology  To page me or covering stroke neurology team member, click here: AMCOM   Choose \"On Call\" tab at top, then search dropdown box for \"Neurology Adult\", select location, press Enter, then look for stroke/neuro ICU/telestroke.    ______________________________________________________    Clinically Significant Risk Factors Present on Admission                 Medications "   Scheduled Meds    aspirin  325 mg Oral Daily     atorvastatin  40 mg Oral QPM     enoxaparin ANTICOAGULANT  40 mg Subcutaneous Q12H       Infusion Meds    - MEDICATION INSTRUCTIONS -         PRN Meds  acetaminophen, hydrALAZINE, HYDROmorphone, labetalol, lidocaine 4%, lidocaine (buffered or not buffered), - MEDICATION INSTRUCTIONS -, naloxone **OR** naloxone **OR** naloxone **OR** naloxone, ondansetron **OR** ondansetron       PHYSICAL EXAMINATION  Temp:  [97  F (36.1  C)-98.2  F (36.8  C)] 97.3  F (36.3  C)  Pulse:  [68-71] 68  Resp:  [11-20] 20  BP: ()/(49-70) 134/69  SpO2:  [93 %-99 %] 94 %      General Exam  General:  patient lying in bed without any acute distress    HEENT:  normocephalic/atraumatic  Pulmonary:  no respiratory distress        Neuro Exam  Mental Status:  alert, oriented x 2, follows commands, speech clear and fluent, naming and repetition normal  Cranial Nerves:  visual fields intact, PERRL, EOMI with normal smooth pursuit, facial sensation intact and symmetric, hearing not formally tested but intact to conversation, palate elevation symmetric and uvula midline, no dysarthria, shoulder shrug strong bilaterally, tongue protrusion midline, L facial droop  Motor:  normal muscle tone and bulk, able to move all limbs spontaneously, no pronator drift, B/L LE effort against gravity present  Reflexes:  toes down-going  Sensory:  light touch sensation intact and symmetric throughout upper and lower extremities, no extinction on double simultaneous stimulation   Coordination:  No ataxia out of propertion to weakness  Station/Gait:  deferred    Stroke Scales    NIHSS  1a. Level of Consciousness 0-->Alert, keenly responsive   1b. LOC Questions 2-->Answers neither question correctly (states she is 76 and it is the month of May currently)   1c. LOC Commands 0-->Performs both tasks correctly   2.   Best Gaze 0-->Normal   3.   Visual 0-->No visual loss   4.   Facial Palsy 1-->Minor paralysis (flattened  nasolabial fold, asymmetry on smiling)   5a. Motor Arm, Left 0-->No drift, limb holds 90 (or 45) degrees for full 10 secs   5b. Motor Arm, Right 0-->No drift, limb holds 90 (or 45) degrees for full 10 secs   6a. Motor Leg, Left 2-->Some effort against gravity, leg falls to bed by 5 secs, but has some effort against gravity   6b. Motor Leg, right 2-->Some effort against gravity, leg falls to bed by 5 secs, but has some effort against gravity   7.   Limb Ataxia 0-->Absent   8.   Sensory 0-->Normal, no sensory loss   9.   Best Language 0-->No aphasia, normal   10. Dysarthria 0-->Normal   11. Extinction and Inattention  0-->No abnormality   Total 7 (07/17/22 1135)           Imaging  I personally reviewed all imaging; relevant findings per HPI.     Lab Results Data   CBC  Recent Labs   Lab 07/17/22  0712 07/16/22  0749 07/15/22  0501   WBC 7.2 9.2 11.7*   RBC 4.00 3.92 4.35   HGB 11.7 11.5* 12.5   HCT 37.5 37.0 39.8    220 238     Basic Metabolic Panel    Recent Labs   Lab 07/17/22  0712 07/16/22  0749 07/15/22  0501 07/14/22  2155 07/14/22  0726 07/14/22  0510    144 141  --   --  138   POTASSIUM 3.4 3.5 3.5  --   --  3.9   CHLORIDE 114* 112* 110*  --   --  108   CO2  --  27 27  --   --  23   BUN  --  14 13  --   --  19   CR  --  0.51* 0.56  --   --  0.57   GLC  --  93 93 112*   < > 109*   ASHIA  --  7.7* 7.5*  --   --  8.0*    < > = values in this interval not displayed.     Liver Panel  No results for input(s): PROTTOTAL, ALBUMIN, BILITOTAL, ALKPHOS, AST, ALT, BILIDIRECT in the last 168 hours.  INR    Recent Labs   Lab Test 07/14/22  0510 07/13/22  1208 09/04/19  0000   INR 1.07 1.04 3.4*      Lipid Profile    Recent Labs   Lab Test 07/13/22  1853   CHOL 163   HDL 47*   LDL 91   TRIG 125     A1C    Recent Labs   Lab Test 07/13/22  1853 03/24/15  0827   A1C 5.5 5.9     Troponin I    Recent Labs   Lab 07/13/22  1208   TROPONINIS 5         Billing: I have personally spent a total of 60 minutes providing care  today, time spent in reviewing medical records and reviewing tests, examining the patient and obtaining history, coordination of care, and discussion with the patient and/or family regarding diagnostic results, prognosis, symptom management, risks and benefits of management options, and development of plan of care. Greater than 50% was spent in counseling and coordination of care.

## 2022-07-18 NOTE — PROVIDER NOTIFICATION
Date paged: 7/18    Time paged: [phone]    Person paged: Rockwell    Paging system used: Web-based    Message: Patient remains highly anxious after multiple interventions, frequently yelling out into hallway. Can we get something for her anxiety? Thanks, Odette CHEW *[phone]    Response: Seroquel ordered

## 2022-07-18 NOTE — PROVIDER NOTIFICATION
"Paged Dr. Rose \"This patient has not had a BM since admission. Can we begin a bowel regimen? Thanks.\"  "

## 2022-07-18 NOTE — PROGRESS NOTES
[facility]    Internal Medicine Hospitalist Progress Note  07/18/2022  I evaluated patient on the above date.    Shaka Rose Jr., MD  [phone] (p)  Text Page  Vocera        Assessment & Plan New actions/orders today (07/18/2022) are underlined.    Cristy Bailey is a 77 year old female with a PMH significant for morbid obesity, hypertension (not on/needing meds), and hyperlipidemia (not on/needing meds), who presented to [facility] 7/13/2022 with complete left-sided paralysis, left-sided facial droop, and difficulty speaking. CTA head remarkable for right MCA occlusion. She was given tenecteplase and subsequently transferred to [facility] 7/13/2022 for thrombectomy.       Acute right MCA ischemic stroke with edema and right to left midline shift.  S/p tenecteplase and subsequent R MCA mechanical thrombectomy 7/13/2022.  * Presented to [facility] 7/13 with complete paralysis, left-sided facial droop, and aphasia. Code stroke initiated. Head CT 7/13 showed signs of an evolving R MCA infarct. CTA head 7/13 showed a right carotid terminus occlusion, right middle cerebral artery M1  segment occlusion with poor opacification of the more distal right MCA branches/poor collateral flow. CTA neck 7/13 negative for acute occlusions. Pt given tenecteplase 7/13 at 13:11. Noted to be agitated and was subsequently intubated given need for procedure.   * Subsequently transferred to [facility] 7/13/2022 where she underwent above procedure.   * 7/14: Extubated. Head CT 7/14 showed evolution of acute infarct involving the R MCA distribution with increased swelling, cortical effacement, and new mild right-to-left midline shift; questionable hypoattenuation involving the bilateral occipital lobes which could be artifactual. Echo 7/14 showed LVEF 50%, grade 1 diastolic dysfunction; RV not well visualized but appeared mild-moderately dilated with global systolic function probably mildly reduced.  Started ASA and atorvastatin.   * 7/15: Head CT 7/15 showed evolving R MCA stroke with areas of edema, overall stable.  - Continue to monitor on telemetry.  - Continue  mg daily, atorvastatin 40 mg daily  - Continue PT, OT.  - BP goal < 140/90.  - Plan discharge with 30 day cardiac monitor.  - Management of dysphagia as below.    Dysphagia due to CVA.  Nutrition.  * Seen by SLP and noted with dysphagia.  * Given boluses for low UO 7/15 and IVF's increased 7/16.  * On 7/17, held on further IVF's given edema and concerns for volume overload (see below).  - Continue pureed diet (level 4), with mildly thick liquids (level 2)  - Advance diet as able per SLP.    Volume overload, diastolic CHF exacerbation.  Essential hypertension.  * Not on/needing meds PTA.   * 7/14: Echo 7/14 showed LVEF 50%, grade 1 diastolic dysfunction; RV not well visualized but appeared mild-moderately dilated with global systolic function probably mildly reduced.   * 7/16: BP's soft in the 90's systolic. Given more IVF's.  * 7/17: BP's more elevated. Pt with significant pedal edema, worse than baseline. Given IV furosemide 60 mg with very good UO response.  - BP goal < 140/90.  - Order furosemide 60 mg IV BID.  - Start lisinopril 10 mg daily.  - Continue PRN IV hydralazine and PRN IV labetalol.  - Continue lymphedema wraps.  - Monitor i/o's, daily wts - continue Carrasquillo (placed on admit) for now while diuresing.    Bilateral lower extremity chronic swelling with chronic venous stasis dermatitis and chronic skin changes.  Hx of LLE cellulitis.  * Was hospitalized in 11/2021 for sepsis 2/2 to pneumonia and LLE cellulitis.   * 7/15: BLE noted to be patty and edematous, no unilateral leg swelling appreciated; LLE had patchy areas of erythema, no fluctuance, no painful areas with palpation; legs warm to touch.  * 7/17: Lymphedema consult ordered.  - Continue lymphedema wraps.  - Keep bilateral lower extremities elevated while in bed or sitting.  -  Monitor clinically for signs of infection.  - CHF management as noted.    DLD.  * FLP 7/13: , HDL 47, LDL 91, .   * Started on atorvastatin this hospitalization.  - Continue atorvastatin 40 mg daily.    Lower back wound, suspect from pressure type injury.  Acute lower back pain, suspect related to lower back wound.  OA s/p bilateral hip replacements and h/o back pain.  * MRI L-spine in 2018 showed multilevel degenerative changes with mild central stenosis. Patient has chronic back pain, reports having it over the last 2 years.   * Pt with significant back pain in the ICU requiring PRN IV hydromorphone.  * PRN hydromorphone decreased 7/15 due to somnolence.  * On 7/16, noted with 5-6 cm by 2-3 cm wound with some swelling/fluid/blistering in a fold of her lower back, did not appear infected; this seemed to be the source of her pain. Padded dressing placed and WOC RN ordered.  * On 7/18, pain improved.  - Continue local wound cares per WOC RN.  - Continue regular repositioning.  - Continue PRN acetaminophen, PRN IV hydromorphone 0.1 mg q4h; minimize opioids as able.    Suspected meningioma left parietal region, incidentally seen on CT.  * Head CT 7/13 also showed a calcified extra-axial mass overlying the left parietal region measuring 1.4 cm, potentially representing a meningioma.  - Follow-up serial monitoring outpatient.    Anemia, suspect dilutional component.  * Hgb normal on admit.   * Hgb 11.5 on 7/16. No overt clinical signs of major bleeding.  Recent Labs   Lab 07/18/22  0721 07/17/22  0712 07/16/22  0749 07/15/22  0501 07/14/22  0510 07/13/22  1208   HGB 12.3 11.7 11.5* 12.5 13.4 13.5   - Monitor CBC.    Intertriginous dermatitis.  * Ordered clotrimazole powder 7/17.  - Continue clotrimazole powder.    Constipation.  * On 7/18, noted that no BM since admit.  - Start scheduled Miralax and Senokot-S.    Hypokalemia, hypophosphatemia.  * Potassium and phosphorus low at times this hospitalization.  Treated with replacement.  Recent Labs   Lab 07/18/22  0721 07/17/22  0712 07/16/22  0749 07/15/22  0501 07/14/22  0510 07/13/22  1208   POTASSIUM 3.0* 3.4 3.5 3.5 3.9 3.9   MAG 2.0 2.1 2.1 2.1 2.2  --    PHOS 2.4* 2.2* 2.1* 2.6 2.8  --    - Continue PRN K and phos replacement.    Morbid obesity.  Body mass index is 59.11 kg/m .  - Needs to pursue aggressive dietary and lifestyle modifications.      Clinically Significant Risk Factors Present on Admission                        COVID-19 testing.  COVID-19 PCR Results    COVID-19 PCR Results 11/6/21 11/18/21 7/13/22   SARS CoV2 PCR Negative Negative Negative      Comments are available for some flowsheets but are not being displayed.         COVID-19 Antibody Results, Testing for Immunity    COVID-19 Antibody Results, Testing for Immunity   No data to display.             Diet: Room Service  Snacks/Supplements Adult: Ensure Max Protein (bariatric); Between Meals  Pureed Diet (level 4) Mildly Thick (level 2) (Small single sips. Eat only when alert and upright at 80-90 degrees.)    Prophylaxis: PCD's, ambulation. Enoxaparin (started 7/15).  Carrasquillo Catheter: PRESENT, indication: Strict 1-2 Hour I&O  Central Lines: None  Code Status: Full Code    Disposition Plan   Expected discharge: 1-2d recommended to transitional care unit pending above.  Entered: Shaka Rose MD 07/18/2022, 11:15 AM         Interval History   Doing OK.  Pain improved.  Responded well to IV diuretic.  C/o constipation.    -Data reviewed today: I reviewed all new labs and imaging over the last 24 hours. I personally reviewed no images or EKG's today.    Physical Exam    , Blood pressure (!) 174/77, pulse 53, temperature 96.9  F (36.1  C), temperature source Oral, resp. rate 16, weight (!) 171.2 kg (377 lb 6.8 oz), SpO2 95 %, not currently breastfeeding. O2 Device: None (Room air)    Vitals:    07/14/22 0200 07/15/22 0522 07/16/22 0500   Weight: (!) 170 kg (374 lb 12.5 oz) (!) 170.2 kg (375 lb  3.6 oz) (!) 171.2 kg (377 lb 6.8 oz)     Vital Signs with Ranges  Temp:  [96.9  F (36.1  C)-97.6  F (36.4  C)] 96.9  F (36.1  C)  Pulse:  [53-62] 53  Resp:  [16] 16  BP: (147-174)/(70-86) 174/77  SpO2:  [92 %-95 %] 95 %  Patient Vitals for the past 24 hrs:   BP Temp Temp src Pulse Resp SpO2   07/18/22 0741 (!) 174/77 96.9  F (36.1  C) Oral 53 16 95 %   07/17/22 1945 (!) 163/86 97.1  F (36.2  C) Oral 60 16 92 %   07/17/22 1443 (!) 147/70 97.6  F (36.4  C) Oral 62 16 93 %     I/O's Last 24 hours  I/O last 3 completed shifts:  In: 300 [P.O.:300]  Out: 7550 [Urine:7550]    Constitutional: Awake, alert, pleasant, conversant.  Respiratory: Diminished in bases. No crackles or wheezes.  Cardiovascular: RRR, no m/r/g.  GI: Obese, soft, nt, +BS.  Skin/Integumen: Bilateral lower extremities heavily ace-wrapped.  Other:          Data   Recent Labs   Lab 07/18/22  0721 07/17/22  1624 07/17/22  1235 07/17/22  0712 07/16/22  0749 07/14/22  0726 07/14/22  0510 07/13/22  1713 07/13/22  1208   WBC 8.3  --   --  7.2 9.2   < > 12.8*  --  8.7   HGB 12.3  --   --  11.7 11.5*   < > 13.4  --  13.5   MCV 91  --   --  94 94   < > 93  --  93     --   --  223 220   < > 235  --  279   INR  --   --   --   --   --   --  1.07  --  1.04     --   --  143 144   < > 138  --  139   POTASSIUM 3.0*  --   --  3.4 3.5   < > 3.9  --  3.9   CHLORIDE 108  --   --  114* 112*   < > 108  --  107   CO2 28  --   --  23 27   < > 23  --  27   BUN 10  --   --  14 14   < > 19  --  21   CR 0.59  --   --  0.51* 0.51*   < > 0.57  --  0.67   ANIONGAP 8  --   --  6 5   < > 7  --  5   ASHIA 8.2*  --   --  7.6* 7.7*   < > 8.0*  --  8.6   GLC 99 104* 96 101* 93   < > 109*   < > 110*   TROPONINIS  --   --   --   --   --   --   --   --  5    < > = values in this interval not displayed.     Recent Labs   Lab Test 07/18/22  0721 07/17/22  1624 07/17/22  1235 07/17/22  0712 07/16/22  0749   GLC 99 104* 96 101* 93     Recent Labs   Lab 07/18/22  0721 07/17/22  0712  07/16/22  0749 07/15/22  0501 07/14/22  0510 07/13/22  1208   WBC 8.3 7.2 9.2 11.7* 12.8* 8.7         No results found for this or any previous visit (from the past 24 hour(s)).    Medications   All medications were reviewed.    - MEDICATION INSTRUCTIONS -         aspirin  325 mg Oral Daily     atorvastatin  40 mg Oral QPM     enoxaparin ANTICOAGULANT  40 mg Subcutaneous Q12H     miconazole   Topical BID     potassium chloride  20 mEq Oral or Feeding Tube Once     sodium phosphate  15 mmol Intravenous Once     acetaminophen, sore throat lozenge, hydrALAZINE, HYDROmorphone, labetalol, lidocaine 4%, lidocaine (buffered or not buffered), - MEDICATION INSTRUCTIONS -, naloxone **OR** naloxone **OR** naloxone **OR** naloxone, ondansetron **OR** ondansetron

## 2022-07-18 NOTE — PLAN OF CARE
Pt here with R MCA stroke s/p TNK. A&O to self. Patient increasingly confused, anxious, and fearful. Emotional support provided as intervention. Giving a task like folding towels helps calm patient. Neuros intact ex BLE weakness. VSS on RA. IV Lasix started for edema. Tele SB with 1st degree AVB. Pureed diet, mildly thickened liquids. Takes pills whole. Up with assist of 2-3 with lift. No BM since admission so bowel regimen initiated. Denies pain. Pt scoring yellow on the Aggression Stop Light Tool. Discharge pending.

## 2022-07-18 NOTE — PLAN OF CARE
Pt here with R MCA stroke, s/p TNK. A&OX to self, confused and forgetful. CMS/Neuros are L facial droop/ BLE weakness. VSS. IV Lasix given as ordered. Tele SB with 1st degree AVB. Pureed diet, mildly liquids. Takes pills crushed. Up with A2-3/lift, Q2 T&Repo, Pt has been been refusing regular scheduled turn and repositioning. Carrasquillo intact and patent. Generalized edema, LE skin scaling and flaky. Denies pain. Pt scoring yellow on the Aggression Stop Light Tool.  Discharge pending.

## 2022-07-18 NOTE — PROGRESS NOTES
[facility]    Stroke Progress Note    Interval EventsStable neuro exam. No acute events overnight.     HPI Summary  Cristy Bailey is a 77 year old female with HTN, cognitive decline at baseline per daughter, wheelchair use at baseline with ADL assistance from  at home.      She presented to [facility] ED 7/13/22 with R gaze deviation and L hemiparesis. Found to have a R carotid terminus/M1 occlusion s/p TNK and transferred to [facility] s/p thrombectomy TICI 3. Unable to obtain MRI secondary to body habitus. Repeat CTH with some mild increased swelling with cortical effacement and new mild R to L midline shift.         Stroke Evaluation Summarized     MRI/Head CT Unable to complete MRI.   Repeat CTH:  1. Evolution of acute infarct involving the right middle cerebral artery distribution with increased swelling, cortical effacement, and new mild right-to-left midline shift. Recommend close follow-up.  2. No evidence of hemorrhage.  3. Questionable hypoattenuation involving the bilateral occipital lobes which could be artifactual (MRI could be performed).  4. Small incidental meningioma along the left parietal lobe.   Intracranial Vasculature CTA head:  1. Right carotid terminus occlusion. Right middle cerebral artery M1 segment occlusion with poor opacification of the more distal right MCA  branches/poor collateral flow.  2. Nonopacification of the A1 segment of the right anterior cerebral artery, with reconstitution of flow into the right hum-yx-lbvctx A1, A2, and more distal right anterior cerebral artery branches likely due  to collateral flow across a patent anterior communicating artery.  3. Otherwise, no significant stenosis or occlusion of the major intracranial arteries.      Cervical Vasculature CTA neck:  1. No significant stenosis or occlusion of the cervical carotid or vertebral arteries.  2. Multiple thyroid nodules, some of which measure 1.5 cm or greater in size. Recommend  "follow-up thyroid ultrasound.         Echocardiogram EF 50%, Grade I diastolic dysfunction, LA mildly dilated   EKG/Telemetry Sinus with first degree AVB   Other Testing Not Applicable      LDL  7/13/2022: 91 mg/dL   A1C  7/13/2022: 5.5 %   Troponin No lab value available in past 48 hrs            Impression   Large acute ischemic stroke of R MCA territory with R carotid terminus-M1 occlusion and possible occlusion of the R A1 segment with cerebral edema, cortical effacement and mild R to L midline shift s/p TNK and mechanical thrombectomy (TICI 3), suspect due to embolic stroke of undetermined source (ESUS)      Plan  - Neuro checks and mohamud signs every 4 hours  - Long term BP goal normotension <140/90 or lower for overall cardiovascular health  - Continue  mg daily for secondary stroke prevention  - Continue Lipitor 40 mg daily, goal LDL 40-70, <40 increases risk of ICH, titrate outpatient with PCP  - Continue inpatient telemetry, discharge with 30 day cardiac event monitor to evaluate for atrial fibrillation (ordered)  - Euthermia, euglycemia, eunatremia  - PT/OT/SPT as tolerated  - Blood glucose monitoring, A1c 5.5%, at goal <7%   - Patient interested in LINDA trial           Patient Follow-up    - in the next 1-2 week(s) with PCP  - in 6-8 weeks with [facility] or other local neurologist of patient's choice       No further stroke evaluation is recommended, so we will sign off. Please contact us with any additional questions.    ISRAEL Samuels, CNP  Vascular Neurology  To page me or covering stroke neurology team member, click here: [facility]COM   Choose \"On Call\" tab at top, then search dropdown box for \"Neurology Adult\", select location, press Enter, then look for stroke/neuro ICU/telestroke.    ______________________________________________________    Clinically Significant Risk Factors Present on Admission                  Medications   Scheduled Meds    aspirin  325 mg Oral " Daily     atorvastatin  40 mg Oral QPM     enoxaparin ANTICOAGULANT  40 mg Subcutaneous Q12H     miconazole   Topical BID       Infusion Meds    - MEDICATION INSTRUCTIONS -         PRN Meds  acetaminophen, sore throat lozenge, hydrALAZINE, HYDROmorphone, labetalol, lidocaine 4%, lidocaine (buffered or not buffered), - MEDICATION INSTRUCTIONS -, naloxone **OR** naloxone **OR** naloxone **OR** naloxone, ondansetron **OR** ondansetron       PHYSICAL EXAMINATION  Temp:  [96.9  F (36.1  C)-97.6  F (36.4  C)] 96.9  F (36.1  C)  Pulse:  [53-62] 53  Resp:  [16] 16  BP: (147-174)/(70-86) 174/77  SpO2:  [92 %-95 %] 95 %      General Exam  General:  patient lying in bed without any acute distress    HEENT:  normocephalic/atraumatic  Pulmonary:  no respiratory distress      Neuro Exam  Mental Status:  alert, oriented x 2, follows commands, speech clear and fluent, naming and repetition normal  Cranial Nerves:  visual fields intact, PERRL, EOMI with normal smooth pursuit, facial sensation intact and symmetric, hearing not formally tested but intact to conversation, palate elevation symmetric and uvula midline, no dysarthria, shoulder shrug strong bilaterally, tongue protrusion midline, L facial droop  Motor:  normal muscle tone and bulk, able to move all limbs spontaneously, no pronator drift, B/L LE effort against gravity present  Reflexes:  toes down-going  Sensory:  light touch sensation intact and symmetric throughout upper and lower extremities, no extinction on double simultaneous stimulation   Coordination:  No ataxia out of propertion to weakness  Station/Gait:  deferred       Stroke Scales    NIHSS  1a. Level of Consciousness 0-->Alert, keenly responsive   1b. LOC Questions 1-->Answers one question correctly   1c. LOC Commands 0-->Performs both tasks correctly   2.   Best Gaze 0-->Normal   3.   Visual 0-->No visual loss   4.   Facial Palsy 1-->Minor paralysis (flattened nasolabial fold, asymmetry on smiling)   5a. Motor  Arm, Left 0-->No drift, limb holds 90 (or 45) degrees for full 10 secs   5b. Motor Arm, Right 0-->No drift, limb holds 90 (or 45) degrees for full 10 secs   6a. Motor Leg, Left 1-->Drift, leg falls by the end of the 5-sec period but does not hit bed   6b. Motor Leg, right 1-->Drift, leg falls by the end of the 5-sec period but does not hit bed   7.   Limb Ataxia 0-->Absent   8.   Sensory 0-->Normal, no sensory loss   9.   Best Language 0-->No aphasia, normal   10. Dysarthria 0-->Normal   11. Extinction and Inattention  0-->No abnormality   Total 4 (07/18/22 0830)           Imaging  I personally reviewed all imaging; relevant findings per HPI.     Lab Results Data   CBC  Recent Labs   Lab 07/18/22  0721 07/17/22  0712 07/16/22  0749   WBC 8.3 7.2 9.2   RBC 4.23 4.00 3.92   HGB 12.3 11.7 11.5*   HCT 38.5 37.5 37.0    223 220     Basic Metabolic Panel    Recent Labs   Lab 07/18/22  0721 07/17/22  1624 07/17/22  1235 07/17/22  0712 07/16/22  0749     --   --  143 144   POTASSIUM 3.0*  --   --  3.4 3.5   CHLORIDE 108  --   --  114* 112*   CO2 28  --   --  23 27   BUN 10  --   --  14 14   CR 0.59  --   --  0.51* 0.51*   GLC 99 104* 96 101* 93   ASHIA 8.2*  --   --  7.6* 7.7*     Liver Panel  No results for input(s): PROTTOTAL, ALBUMIN, BILITOTAL, ALKPHOS, AST, ALT, BILIDIRECT in the last 168 hours.  INR    Recent Labs   Lab Test 07/14/22  0510 07/13/22  1208 09/04/19  0000   INR 1.07 1.04 3.4*      Lipid Profile    Recent Labs   Lab Test 07/13/22  1853   CHOL 163   HDL 47*   LDL 91   TRIG 125     A1C    Recent Labs   Lab Test 07/13/22  1853 03/24/15  0827   A1C 5.5 5.9     Troponin I    Recent Labs   Lab 07/13/22  1208   TROPONINIS 5         Billing: I have personally spent a total of 55 minutes providing care today, time spent in reviewing medical records and reviewing tests, examining the patient and obtaining history, coordination of care, and discussion with the patient and/or family regarding diagnostic  results, prognosis, symptom management, risks and benefits of management options, and development of plan of care. Greater than 50% was spent in counseling and coordination of care.

## 2022-07-18 NOTE — PROGRESS NOTES
07/18/22 0900   Quick Adds   Quick Adds Edema Eval   General Information   Onset of Illness/Injury or Date of Surgery 07/13/22   Referring Physician Shaka Rose MD   Pertinent History of Current Problem (include personal factors and/or comorbidities that impact the POC) Cristy Bailey is a 77 year old female with a PMH significant for morbid obesity, hypertension (not on/needing meds), and hyperlipidemia (not on/needing meds), who presented to [facility] 7/13/2022 with complete left-sided paralysis, left-sided facial droop, and difficulty speaking. CTA head remarkable for right MCA occlusion. She was given tenecteplase and subsequently transferred to [facility] 7/13/2022 for thrombectomy.   Edema General Information   Onset of Edema   (Chronic)   Affected Body Part(s) Left LE;Right LE   Edema Precautions Cardiac Edema/CHF   General Comments/Previous Edema Treatment/Edema Equipment Pt has had lymphedema wraps and compression stockings in the past, she reports she tolerates them well.   Edema Examination/Assessment   Skin Condition Pitting;Lipodermatosclerosis;Dryness   Skin Condition Comments BLE dryness, LLE with bandage to lateral lower leg, noted 2 very small blisters on L lower leg. 3+ pitting edema. pts feet very swollen   Stemmer Sign Positive   Pitting Assessment 3+ in BLE   Cognition   Affect/Mental Status (Cognition) confused   Cognitive Status Comments Pt oriented to self, confused however able to make needs known, pt awake and alert, able to follow commands well.   Pain Assessment   Patient Currently in Pain No   Strength (Manual Muscle Testing)   Strength Comments unable to perform SLR, lift for mobility at this time. Pt able to perform AP and SAQ supine   Sensory Examination   Sensory Perception Comments Intact to light touch   Clinical Impression   Criteria for Skilled Therapeutic Intervention Yes, treatment indicated   PT Diagnosis (PT) Impaired functional mobility, 3+ BLE  pitting edema and skin dryness   Edema: Patient Presentation Edema   Influenced by the following impairments medical condition, fluid retention, see PT eval   Functional limitations due to impairments impaired mobility   Clinical Presentation (PT Evaluation Complexity) Evolving/Changing   Clinical Presentation Rationale clinical judgement, co morbidities, cognition   Clinical Decision Making (Complexity) moderate complexity   Planned Therapy Interventions (PT) home exercise program;patient/family education;risk factor education;manual therapy techniques   Risk & Benefits of therapy have been explained evaluation/treatment results reviewed;care plan/treatment goals reviewed;risks/benefits reviewed;patient   PT Discharge Planning   PT Discharge Recommendation (DC Rec) Long term care facility;home with assist;home with home care physical therapy   PT Rationale for DC Rec Pt is Ax2-3 for repo & behzad lift tx, very weak.Unable to care for self at home. Lymph eval complete and wraps donned, pt tolerating well   Total Evaluation Time   Total Evaluation Time (Minutes) 15   Physical Therapy Goals   PT Frequency 5x/week   PT Predicted Duration/Target Date for Goal Attainment 07/25/22   PT Goals Edema   PT: Edema education to increase ability to manage edema after discharge from the hospital Patient;Caregiver;Verbalize;Skin care routine;signs/symptoms of intolerance;wear schedule;limb positioning   PT: Management of edema bandages Caregiver;Verbalize;quick wrap   PT: Functional edema exercise program to reduce limb volume, increase activity tolerance and improve independence with ADL Patient;Demonstrates;HEP

## 2022-07-18 NOTE — PROVIDER NOTIFICATION
"Paged Dr. Rose \"Neuro signed off. Can neuros be changed to Q8?\"    Addendum: Q8 neuros ordered.   "

## 2022-07-18 NOTE — CONSULTS
[facility]  WOC Nurse Inpatient Assessment     Consulted for: lower back wound         Areas Assessed:      Areas visualized during today's visit: Focused: and mid back    Wound location: mid back         Last photo: 7/18  Wound due to: unclear etiology, patient unable to report if she was wearing a bra or piece of clothing which caused the injury, denies pain, possibly caused by a sheet or bedcloths fold   Wound history/plan of care: found with sacral mepilex in use but rolled up due to patient body habitus / skin folds / friction (BMI 59)   Wound base: 100 % blanchable , erythema and epidermis, textured      Palpation of the wound bed: normal      Drainage: none     Description of drainage: none     Measurements (length x width x depth, in cm): greater than length of measuring guide 1.2cm  x 15+cm   x  0 cm      Tunneling: N/A     Undermining: N/A  Periwound skin: Intact      Color: normal and consistent with surrounding tissue      Temperature: normal   Odor: none  Pain: denies , none  Pain interventions prior to dressing change: patient tolerated well  Treatment goal: Heal   STATUS: initial assessment  Supplies ordered: supplies stored on unit       Treatment Plan:      Wound care  Start:  07/19/22 0600,   DAILY,   Routine        Comments: Location: mid back   Care: provided daily by primary RN   1. Cleanse with routine hygiene (bed bath, shower, ect)   2. Apply skin prep (3M no sting barrier) over erythema, let dry   3. Leave oven to air, avoid sacral mepilex for this patient due to rolling up in skin fold          Orders: Written    RECOMMEND PRIMARY TEAM ORDER: None, at this time  Education provided: importance of repositioning and plan of care  Discussed plan of care with: Patient and Nurse  WOC nurse follow-up plan: signing off  Notify WOC if wound(s) deteriorate.  Nursing to notify the Provider(s) and re-consult the WOC Nurse if new skin concern.    DATA:     Current support  surface: Bariatric Low air loss mattress  Containment of urine/stool: Incontinence Protocol and Incontinent pad in bed  BMI: Body mass index is 59.11 kg/m .   Active diet order: Orders Placed This Encounter      Pureed Diet (level 4) Mildly Thick (level 2) (Small single sips. Eat only when alert and upright at 80-90 degrees.)     Output: I/O last 3 completed shifts:  In: 300 [P.O.:300]  Out: 7550 [Urine:7550]     Labs: Recent Labs   Lab 07/18/22  0721 07/15/22  0501 07/14/22  0510 07/13/22  1853   HGB 12.3   < > 13.4  --    INR  --   --  1.07  --    WBC 8.3   < > 12.8*  --    A1C  --   --   --  5.5    < > = values in this interval not displayed.     Pressure injury risk assessment:   Sensory Perception: 3-->slightly limited  Moisture: 3-->occasionally moist  Activity: 1-->bedfast  Mobility: 2-->very limited  Nutrition: 2-->probably inadequate  Friction and Shear: 2-->potential problem  Demetrio Score: 13    Eleanor LREFREN   Dept. Pager: [phone]  Dept. Office Number: [phone]

## 2022-07-19 NOTE — CONSULTS
Care Management Initial Consult    General Information  Assessment completed with: Spouse or significant other Leo  Type of CM/SW Visit: Initial Assessment    Primary Care Provider verified and updated as needed:     Readmission within the last 30 days:        Reason for Consult: discharge planning  Advance Care Planning:     Has ACP naming Zonia as HCA.    Communication Assessment  Patient's communication style: spoken language (English or Bilingual)    Hearing Difficulty or Deaf: no   Wear Glasses or Blind: yes    Cognitive  Cognitive/Neuro/Behavioral: .WDL except, orientation  Level of Consciousness: confused, alert  Arousal Level: opens eyes spontaneously  Orientation: disoriented to, time, situation  Mood/Behavior: anxious  Best Language: 0 - No aphasia  Speech: clear, spontaneous, logical    Living Environment:   People in home: significant other  Leo  Current living Arrangements:        Able to return to prior arrangements: no. Pt has been evaluated and LTC placement is recommended.       Family/Social Support:  Care provided by: spouse/significant other  Provides care for:    Marital Status: Lives with Significant Other             Description of Support System: Supportive, Involved  Pt has significant other Leo. They have been together for 50 years.  Pt was  previously and her children are from her marriage.       Current Resources:   Patient receiving home care services:       Community Resources:    Equipment currently used at home: grab bar, tub/shower, raised toilet seat, shower chair, walker, rolling, wheelchair, manual  Supplies currently used at home:      Employment/Financial:  Employment Status:          Financial Concerns:  [employer]     Lifestyle & Psychosocial Needs:  Social Determinants of Health     Tobacco Use: Medium Risk     Smoking Tobacco Use: Former Smoker     Smokeless Tobacco Use: Never Used   Alcohol Use: Not on file   Financial Resource Strain: Not on file   Food  Insecurity: Not on file   Transportation Needs: Not on file   Physical Activity: Not on file   Stress: Not on file   Social Connections: Not on file   Intimate Partner Violence: Not on file   Depression: Not at risk     PHQ-2 Score: 0   Housing Stability: Not on file       Functional Status:  Prior to admission patient needed assistance: Pt needed assistance in the home. S.OAntonio Bailey provided assistance but is unable to continue due to pt's increase in care needs.   S.O. states that she was able to walk to the bathroom previously and slept in a lounge chair. Pt is currently requires a lift and assist of two for mobilty.       Mental Health Status:          Chemical Dependency Status:        Values/Beliefs:  Spiritual, Cultural Beliefs, Congregation Practices, Values that affect care:                 Additional Information:  SW spoke with pt's Leo WILDE regarding LTC recommendations and he is in agreement stating that he can no longer manage her care at home.  They reside in [place] so will start with referrals to [facility], [facility], [facility] and [facility].      JOSE GUADALUPE Barakat  [facility]  Care Transitions  [phone]

## 2022-07-19 NOTE — PROGRESS NOTES
[facility]    Internal Medicine Hospitalist Progress Note  07/19/2022  I evaluated patient on the above date.    Shaka Rose Jr., MD  [phone] (p)  Text Page  Vocera        Assessment & Plan New actions/orders today (07/19/2022) are underlined.    Cristy Bailey is a 77 year old female with a PMH significant for morbid obesity, hypertension (not on/needing meds), and hyperlipidemia (not on/needing meds), who presented to [facility] 7/13/2022 with complete left-sided paralysis, left-sided facial droop, and difficulty speaking. CTA head remarkable for right MCA occlusion. She was given tenecteplase and subsequently transferred to [facility] 7/13/2022 for thrombectomy.       Acute right MCA ischemic stroke with edema, right to left midline shift, cortical effacement.  S/p tenecteplase and subsequent R MCA mechanical thrombectomy 7/13/2022.  * Presented to [facility] 7/13 with complete paralysis, left-sided facial droop, and aphasia. Code stroke initiated. Head CT 7/13 showed signs of an evolving R MCA infarct. CTA head 7/13 showed a right carotid terminus occlusion, right middle cerebral artery M1  segment occlusion with poor opacification of the more distal right MCA branches/poor collateral flow. CTA neck 7/13 negative for acute occlusions. Pt given tenecteplase 7/13 at 13:11. Noted to be agitated and was subsequently intubated given need for procedure.   * Subsequently transferred to [facility] 7/13/2022 where she underwent above procedure.   * 7/14: Extubated. Head CT 7/14 showed evolution of acute infarct involving the R MCA distribution with increased swelling, cortical effacement, and new mild right-to-left midline shift; questionable hypoattenuation involving the bilateral occipital lobes which could be artifactual. Echo 7/14 showed LVEF 50%, grade 1 diastolic dysfunction; RV not well visualized but appeared mild-moderately dilated with global systolic function probably  mildly reduced. Started ASA and atorvastatin.   * 7/15: Head CT 7/15 showed evolving R MCA stroke with areas of edema, overall stable.  - Continue to monitor on telemetry.  - Continue  mg daily, atorvastatin 40 mg daily  - Continue PT, OT.  - BP goal < 140/90.  - Plan discharge with 30 day cardiac monitor.  - Follow-up with [facility] in 6-8 weeks.  - Management of dysphagia as below.    Dysphagia due to CVA.  Nutrition.  * Seen by SLP and noted with dysphagia.  * Given boluses for low UO 7/15 and IVF's increased 7/16.  * On 7/17, held on further IVF's given edema and concerns for volume overload (see below).  - Continue pureed diet (level 4), with mildly thick liquids (level 2)  - Advance diet as able per SLP.    Volume overload, diastolic CHF exacerbation.  Essential hypertension.  * Not on/needing meds PTA.   * 7/14: Echo 7/14 showed LVEF 50%, grade 1 diastolic dysfunction; RV not well visualized but appeared mild-moderately dilated with global systolic function probably mildly reduced.   * 7/16: BP's soft in the 90's systolic. Given more IVF's.  * 7/17: BP's more elevated. Pt with significant pedal edema, worse than baseline. Given IV furosemide 60 mg with very good UO response.  * 7/18: Started on lisinopril. Continued on furosemide IV.  * 7/19: BP's improved.  - BP goal < 140/90.  - Discontinue IV furosemide and start on furosemide 40 mg daily starting tomorrow 7/20.  - Continue lisinopril 10 mg daily.  - Continue PRN IV hydralazine and PRN IV labetalol.  - Continue lymphedema wraps.  - Monitor i/o's, daily wts - remove this afternoon (received IV furosemide this am); subsequently monitor for urinary retention.    Bilateral lower extremity chronic swelling with chronic venous stasis dermatitis and chronic skin changes.  Hx of LLE cellulitis.  * Was hospitalized in 11/2021 for sepsis 2/2 to pneumonia and LLE cellulitis.   * 7/15: BLE noted to be patty and edematous, no unilateral leg swelling appreciated; LLE  had patchy areas of erythema, no fluctuance, no painful areas with palpation; legs warm to touch.  * 7/17: Lymphedema consult ordered.  - Continue lymphedema wraps.  - Keep bilateral lower extremities elevated while in bed or sitting.  - Monitor clinically for signs of infection.  - CHF management as noted.    DLD.  * FLP 7/13: , HDL 47, LDL 91, .   * Started on atorvastatin this hospitalization.  - Continue atorvastatin 40 mg daily.    Lower back wound, suspect from pressure type injury.  Acute lower back pain, suspect related to lower back wound.  OA s/p bilateral hip replacements and h/o back pain.  * MRI L-spine in 2018 showed multilevel degenerative changes with mild central stenosis. Patient has chronic back pain, reports having it over the last 2 years.   * Pt with significant back pain in the ICU requiring PRN IV hydromorphone.  * PRN hydromorphone decreased 7/15 due to somnolence.  * On 7/16, noted with 5-6 cm by 2-3 cm wound with some swelling/fluid/blistering in a fold of her lower back, did not appear infected; this seemed to be the source of her pain. Padded dressing placed and WOC RN ordered.  * On 7/18, pain improved.  - Continue local wound cares per WOC RN.  - Continue regular repositioning.  - Continue PRN acetaminophen, PRN IV hydromorphone 0.1 mg q4h; minimize opioids as able.    Suspected meningioma left parietal region, incidentally seen on CT.  * Head CT 7/13 also showed a calcified extra-axial mass overlying the left parietal region measuring 1.4 cm, potentially representing a meningioma.  - Follow-up serial monitoring outpatient.    Anemia, suspect dilutional component.  * Hgb normal on admit.   * Hgb 11.5 on 7/16. No overt clinical signs of major bleeding.  Recent Labs   Lab 07/19/22  0904 07/18/22  0721 07/17/22  0712 07/16/22  0749 07/15/22  0501 07/14/22  0510   HGB 12.8 12.3 11.7 11.5* 12.5 13.4   - Monitor CBC.    Intertriginous dermatitis.  * Ordered clotrimazole powder  7/17.  - Continue clotrimazole powder.    Constipation.  * On 7/18, noted that no BM since admit. Started bowel regimen.  - Continue scheduled Miralax and Senokot-S.    Hypokalemia, hypophosphatemia.  * Potassium and phosphorus low at times this hospitalization. Treated with replacement.  Recent Labs   Lab 07/19/22  0904 07/18/22  1513 07/18/22  0721 07/17/22  0712 07/16/22  0749 07/15/22  0501 07/14/22  0510   POTASSIUM 2.8* 3.5 3.0* 3.4 3.5 3.5 3.9   MAG 1.8  --  2.0 2.1 2.1 2.1 2.2   PHOS  --  2.6 2.4* 2.2* 2.1* 2.6 2.8   - Continue PRN K and phos replacement.    Morbid obesity.  Body mass index is 59.11 kg/m .  - Needs to pursue aggressive dietary and lifestyle modifications.      Clinically Significant Risk Factors Present on Admission                        COVID-19 testing.  COVID-19 PCR Results    COVID-19 PCR Results 11/6/21 11/18/21 7/13/22   SARS CoV2 PCR Negative Negative Negative      Comments are available for some flowsheets but are not being displayed.         COVID-19 Antibody Results, Testing for Immunity    COVID-19 Antibody Results, Testing for Immunity   No data to display.             Diet: Room Service  Snacks/Supplements Adult: Ensure Max Protein (bariatric); Between Meals  Pureed Diet (level 4) Mildly Thick (level 2) (Small single sips. Eat only when alert and upright at 80-90 degrees.)    Prophylaxis: PCD's, ambulation. Enoxaparin (started 7/15).  Carrasquillo Catheter: PRESENT, indication: Strict 1-2 Hour I&O  Central Lines: None  Code Status: Full Code    Disposition Plan   Expected discharge: As early as tomorrow 7/20 recommended to transitional care unit pending above.  Entered: Shaka Rose MD 07/19/2022, 11:13 AM         Interval History   Pain with repositioning with lift machine.  Feeling thirsty now.  Otherwise doing OK.    -Data reviewed today: I reviewed all new labs and imaging over the last 24 hours. I personally reviewed no images or EKG's today.    Physical Exam    , Blood  pressure 138/72, pulse 64, temperature 98  F (36.7  C), temperature source Oral, resp. rate 16, weight (!) 171.2 kg (377 lb 6.8 oz), SpO2 94 %, not currently breastfeeding. O2 Device: None (Room air)    Vitals:    07/14/22 0200 07/15/22 0522 07/16/22 0500   Weight: (!) 170 kg (374 lb 12.5 oz) (!) 170.2 kg (375 lb 3.6 oz) (!) 171.2 kg (377 lb 6.8 oz)     Vital Signs with Ranges  Temp:  [97.6  F (36.4  C)-98.2  F (36.8  C)] 98  F (36.7  C)  Pulse:  [55-64] 64  Resp:  [16] 16  BP: (137-148)/(64-74) 138/72  SpO2:  [94 %-96 %] 94 %  Patient Vitals for the past 24 hrs:   BP Temp Temp src Pulse Resp SpO2   07/19/22 0730 138/72 98  F (36.7  C) Oral 64 16 94 %   07/18/22 1601 137/74 98.2  F (36.8  C) Oral 60 16 94 %   07/18/22 1136 (!) 148/64 -- -- 55 16 96 %   07/18/22 1135 -- 97.6  F (36.4  C) Oral -- -- --     I/O's Last 24 hours  I/O last 3 completed shifts:  In: -   Out: 6475 [Urine:6475]    Constitutional: Awake, alert, pleasant, conversant.  Respiratory: Diminished in bases. No crackles or wheezes.  Cardiovascular: RRR, no m/r/g.  GI: Obese, soft, nt, +BS.  Skin/Integumen: Bilateral lower extremities heavily ace-wrapped, but swelling appears improved.  Other:          Data   Recent Labs   Lab 07/19/22  0904 07/19/22  0800 07/18/22  2102 07/18/22  1700 07/18/22  1513 07/18/22  1216 07/18/22  0721 07/17/22  1235 07/17/22  0712 07/14/22  0726 07/14/22  0510 07/13/22  1713 07/13/22  1208   WBC 7.2  --   --   --   --   --  8.3  --  7.2   < > 12.8*  --  8.7   HGB 12.8  --   --   --   --   --  12.3  --  11.7   < > 13.4  --  13.5   MCV 90  --   --   --   --   --  91  --  94   < > 93  --  93     --   --   --   --   --  251  --  223   < > 235  --  279   INR  --   --   --   --   --   --   --   --   --   --  1.07  --  1.04     --   --   --   --   --  144  --  143   < > 138  --  139   POTASSIUM 2.8*  --   --   --  3.5  --  3.0*  --  3.4   < > 3.9  --  3.9   CHLORIDE 104  --   --   --   --   --  108  --  114*   < > 108   --  107   CO2 31  --   --   --   --   --  28  --  23   < > 23  --  27   BUN 10  --   --   --   --   --  10  --  14   < > 19  --  21   CR 0.58  --   --   --   --   --  0.59  --  0.51*   < > 0.57  --  0.67   ANIONGAP 7  --   --   --   --   --  8  --  6   < > 7  --  5   ASHIA 8.6  --   --   --   --   --  8.2*  --  7.6*   < > 8.0*  --  8.6   * 88 109*   < >  --    < > 99   < > 101*   < > 109*   < > 110*   TROPONINIS  --   --   --   --   --   --   --   --   --   --   --   --  5    < > = values in this interval not displayed.     Recent Labs   Lab Test 07/19/22  0904 07/19/22  0800 07/18/22  2102 07/18/22  1700 07/18/22  1216   * 88 109* 105* 102*     Recent Labs   Lab 07/19/22  0904 07/18/22  0721 07/17/22  0712 07/16/22  0749 07/15/22  0501 07/14/22  0510   WBC 7.2 8.3 7.2 9.2 11.7* 12.8*         No results found for this or any previous visit (from the past 24 hour(s)).    Medications   All medications were reviewed.    - MEDICATION INSTRUCTIONS -         aspirin  325 mg Oral Daily     atorvastatin  40 mg Oral QPM     enoxaparin ANTICOAGULANT  40 mg Subcutaneous Q12H     [START ON 7/20/2022] furosemide  40 mg Oral Daily     lisinopril  10 mg Oral Daily     miconazole   Topical BID     polyethylene glycol  17 g Oral Daily     potassium chloride  20 mEq Oral or Feeding Tube Once     senna-docusate  1-2 tablet Oral BID     acetaminophen, sore throat lozenge, hydrALAZINE, HYDROmorphone, labetalol, lidocaine 4%, lidocaine (buffered or not buffered), - MEDICATION INSTRUCTIONS -, naloxone **OR** naloxone **OR** naloxone **OR** naloxone, ondansetron **OR** ondansetron, QUEtiapine

## 2022-07-19 NOTE — PLAN OF CARE
Pt here with R MCA stroke s/p TNK. A&O to self and place. Neuros intact ex BLE weakness. VSS on RA. Tele SB. Pureed diet, mildly thick liquids. Takes pills whole. Up with lift device. Denies pain. Pt given Miralax for constipation to no effect. Pt given Seloquel to reduce anxiety and agitation. Pt relaxed well for remainder of shift. Pt scoring green on the Aggression Stop Light Tool. Plan to remove snyder catheter in evening; monitor for urinary retention. K redraw pending results after replacement therapy. Discharge pending TCU placement.

## 2022-07-19 NOTE — PLAN OF CARE
Reason for Admission: R MCA CVA, post-TNK    Cognitive/Mentation: A/Ox self, anxious, confused at times  Neuros/CMS: Intact ex slight L droop, generalized weakness  VS: Stable. Tele: SR/SB w/ 1 degree AV block.  GI: BS +, + flatus, last BM PTA, bowel meds given.  : Carrasquillo for retention and strict I&O.   Pulmonary: LS diminished.  Pain: 8/10 pain in back, exacerbated with movement. Tylenol and ice packs given.     Drains: None  Skin: Lymphedema to bilateral legs, lymph wraps in place. Wound to mid-back, wound care provided. Rash from tele patches, no-sting barrier applied under new patches  Activity: Assist x 2-3 with Lift.  Diet: Pureed with mildly thickened liquids. Takes pills whole with thickened water.     Aggression Stoplight Score: Yellow  Therapies recs: LTC  Discharge: Pending    End of shift summary: Patient anxious throughout shift, yelling out into hallway at times. Seroquel given to some effect. Patient infrequently refusing repositioning, charted in flowsheets. Q8 neuros ordered, stroke neuro signed off. Lymph wraps on legs to be taken off at 0900 today.

## 2022-07-20 NOTE — PLAN OF CARE
Goal Outcome Evaluation:    [phone]  Pt alert to self. VSS on Rm Air. Tele Sinus Nelson. C/o pain to back; PRN Tylenol given w/ little effect. Turn & repo. Asstx2 w/ lift. Pureed diet w/ mildly thick liquids. +3 edema to BLE; lymphedema wraps on. Open area to R flank; Mepilex applied d/t comfort, pt stating pain. Carrasquillo removed per orders; Purewick placed. Discharge pending.

## 2022-07-20 NOTE — PROGRESS NOTES
[facility]    Medicine Progress Note - Hospitalist Service    Date of Admission:  7/13/2022    Assessment & Plan        Cristy Bailey is a 77 year old female with a PMH significant for morbid obesity, hypertension (not on/needing meds), and hyperlipidemia (not on/needing meds), who presented to [facility] 7/13/2022 with complete left-sided paralysis, left-sided facial droop, and difficulty speaking. CTA head remarkable for right MCA occlusion. She was given tenecteplase and subsequently transferred to [facility] 7/13/2022 for thrombectomy.      Acute right MCA ischemic stroke with edema and right to left midline shift  S/p tenecteplase and subsequent R MCA mechanical thrombectomy 7/13/2022  Presented to [facility] 7/13 with complete paralysis, left-sided facial droop, and aphasia. Code stroke initiated. Head CT 7/13 showed signs of an evolving R MCA infarct. CTA head 7/13 showed a right carotid terminus occlusion, right middle cerebral artery M1  segment occlusion with poor opacification of the more distal right MCA branches/poor collateral flow. CTA neck 7/13 negative for acute occlusions. Pt given tenecteplase 7/13 at 13:11. Noted to be agitated and was subsequently intubated given need for procedure.   Subsequently transferred to [facility] 7/13/2022 where she underwent above procedure.   7/14: Extubated. Head CT 7/14 showed evolution of acute infarct involving the R MCA distribution with increased swelling, cortical effacement, and new mild right-to-left midline shift; questionable hypoattenuation involving the bilateral occipital lobes which could be artifactual. Echo 7/14 showed LVEF 50%, grade 1 diastolic dysfunction; RV not well visualized but appeared mild-moderately dilated with global systolic function probably mildly reduced. Started ASA and atorvastatin.   7/15: Head CT 7/15 showed evolving R MCA stroke with areas of edema, overall stable.  - Continue to monitor on  telemetry  - Continue  mg daily, atorvastatin 40 mg daily  - Continue PT, OT  - Social work following for disposition.  Likely needs LTC   - BP goal < 140/90  - Will defer 30 day cardiac monitor to PCP or neurology  - Follow-up with [facility] in 6-8 weeks  - Management of dysphagia as below     Dysphagia due to CVA  Nutrition  Seen by SLP and noted with dysphagia.  Given boluses for low UO 7/15 and IVF's increased 7/16.  7/17, held on further IVF's given edema and concerns for volume overload (see below).  - Continue pureed diet (level 4), with mildly thick liquids (level 2)  - Advance diet as able per SLP.     Volume overload, diastolic CHF exacerbation.  Essential hypertension.  Not on/needing meds PTA.   * 7/14: Echo 7/14 showed LVEF 50%, grade 1 diastolic dysfunction; RV not well visualized but appeared mild-moderately dilated with global systolic function probably mildly reduced.   * 7/16: BP's soft in the 90's systolic. Given more IVF's.  * 7/17: BP's more elevated. Pt with significant pedal edema, worse than baseline. Given IV furosemide 60 mg with very good UO response.  * 7/18: Started on lisinopril. Continued on furosemide IV.  * 7/19: BP's improved.  - Discontinued IV furosemide and started on furosemide 40 mg daily on 7/20  - Continue lisinopril 10 mg daily  - Continue PRN IV hydralazine and PRN IV labetalol  - Monitor i/o's, daily wts     Bilateral lower extremity chronic swelling with chronic venous stasis dermatitis and chronic skin changes  Hx of LLE cellulitis  Was hospitalized in 11/2021 for sepsis 2/2 to pneumonia and LLE cellulitis.   7/15: BLE noted to be patty and edematous, no unilateral leg swelling appreciated; LLE had patchy areas of erythema, no fluctuance, no painful areas with palpation; legs warm to touch.  7/17: Lymphedema consult ordered  - Keep bilateral lower extremities elevated while in bed or sitting  - Monitor clinically for signs of infection  - Continue lymphedema wraps  -  CHF management as noted.     DLD.  FLP 7/13: , HDL 47, LDL 91,   - Continue atorvastatin 40 mg daily     Lower back wound, suspect from pressure type injury  Acute lower back pain, suspect related to lower back wound  OA s/p bilateral hip replacements and h/o back pain  Pt with significant back pain in the ICU requiring PRN IV hydromorphone.  PRN hydromorphone decreased 7/15 due to somnolence.  * MRI L-spine in 2018 showed multilevel degenerative changes with mild central stenosis. Patient has chronic back pain, reports having it over the last 2 years.   7/16: Noted with 5-6 cm by 2-3 cm wound with some swelling/fluid/blistering in a fold of her lower back, did not appear infected; this seemed to be the source of her pain. Padded dressing placed and WOC RN ordered.  7/18: Pain improved  - Continue local wound cares per WOC RN  - Continue regular repositioning  - Continue PRN acetaminophen, PRN IV hydromorphone 0.1 mg q4h; minimize opioids as able     Suspected meningioma left parietal region, incidentally seen on CT  Head CT 7/13 also showed a calcified extra-axial mass overlying the left parietal region measuring 1.4 cm, potentially representing a meningioma.  - Follow-up serial monitoring outpatient    Anemia, suspect dilutional component  Hgb normal on admit.  Hgb 11.5 on 7/16. No overt clinical signs of major bleeding.  - Monitor intermittently      Intertriginous dermatitis  - Continue clotrimazole powder, started on 7/117      Constipation  On 7/18, noted that no BM since admit. Started bowel regimen.  - Continue scheduled Miralax and Senokot-S     Hypokalemia, hypophosphatemia  Potassium and phosphorus low at times this hospitalization. Treated with replacement.  - Continue PRN K and phos replacement     Morbid obesity  Body mass index is 59.11 kg/m .  - Needs to pursue aggressive dietary and lifestyle modifications                Diet: Room Service  Snacks/Supplements Adult: Ensure Max Protein  (bariatric); Between Meals  Pureed Diet (level 4) Mildly Thick (level 2) (Small single sips. Eat only when alert and upright at 80-90 degrees.)  Advance Diet as Tolerated  Room Service    DVT Prophylaxis: Pneumatic Compression Devices  Carrasquillo Catheter: Not present  Central Lines: None  Cardiac Monitoring: ACTIVE order. Indication: See H&P and/or Progress Notes  Code Status: Full Code      Disposition Plan      Expected Discharge Date: 07/22/2022    Discharge Delays: *Medically Ready for Discharge  Placement - LTC    Discharge Comments: LTC?        The patient's care was discussed with the Care Coordinator/, Patient and Patient's Family.    Pro Huynh, DO  Hospitalist Service  [facility]  Securely message with the Vocera Web Console (learn more here)  Text page via [employer] Paging/Directory         Clinically Significant Risk Factors Present on Admission                      ______________________________________________________________________    Interval History   Patient seen and examined.  No acute events over night.  No new neurologic symptoms. SO states patient still has some confusion.  No fevers noted.  No pain or hypoxia.  Tolerating the modified diet    Data reviewed today: I reviewed all medications, new labs and imaging results over the last 24 hours. I personally reviewed no images or EKG's today.    Physical Exam   Vital Signs: Temp: 98.4  F (36.9  C) Temp src: Axillary BP: 108/66 Pulse: 66   Resp: 16 SpO2: 98 % O2 Device: None (Room air)    Weight: 377 lbs 6.84 oz  General Appearance: Resting comfortably in chair.  NAD   Respiratory: Clear to auscultation.  No respiratory distress  Cardiovascular: RRR.  No obvious murmurs  GI: Soft.  Non-distende  Skin: No obvious rashes or cyanosis to exposed skin  Other: Bilateral lower extremities are wrapped. Alert.  No obvious facial droops     Data   Recent Labs   Lab 07/20/22  0823 07/19/22  1746 07/19/22  1635  07/19/22  0904 07/19/22  0800 07/18/22  1216 07/18/22  0721 07/17/22  1235 07/17/22  0712 07/14/22  0726 07/14/22  0510 07/13/22  1713 07/13/22  1208   WBC  --   --   --  7.2  --   --  8.3  --  7.2   < > 12.8*  --  8.7   HGB  --   --   --  12.8  --   --  12.3  --  11.7   < > 13.4  --  13.5   MCV  --   --   --  90  --   --  91  --  94   < > 93  --  93   PLT  --   --   --  271  --   --  251  --  223   < > 235  --  279   INR  --   --   --   --   --   --   --   --   --   --  1.07  --  1.04     --   --  142  --   --  144  --  143   < > 138  --  139   POTASSIUM 3.2* 3.2* 3.3* 2.8*  --    < > 3.0*  --  3.4   < > 3.9  --  3.9   CHLORIDE 103  --   --  104  --   --  108  --  114*   < > 108  --  107   CO2 34*  --   --  31  --   --  28  --  23   < > 23  --  27   BUN 12  --   --  10  --   --  10  --  14   < > 19  --  21   CR 0.59  --   --  0.58  --   --  0.59  --  0.51*   < > 0.57  --  0.67   ANIONGAP 3  --   --  7  --   --  8  --  6   < > 7  --  5   ASHIA 8.3*  --   --  8.6  --   --  8.2*  --  7.6*   < > 8.0*  --  8.6   GLC 91  --   --  105* 88   < > 99   < > 101*   < > 109*   < > 110*    < > = values in this interval not displayed.     No results found for this or any previous visit (from the past 24 hour(s)).

## 2022-07-20 NOTE — PROGRESS NOTES
"BRIEF NUTRITION ASSESSMENT    REASON FOR ASSESSMENT:  Cristy Bailey is a 77 year old female seen by Registered Dietitian for [facility]    NUTRITION HISTORY:  - Admitted for MCA occlusion. Tx to [facility] for thrombectomy.   - H/o morbid obesity.     CURRENT DIET AND INTAKE:  Diet: IDDSI level 4+IDDSI level 2  Tolerating 50-75% of meals.   Meals are ordered TID most days.     ANTHROPOMETRICS:  Height: 5' 7\"  Weight:  377 lbs 6.84 oz (171.2 kg)   Body mass index is 59.11 kg/m .   Weight Status: Obesity Grade III BMI >40  IBW:  61.4 kg   %IBW: 279%  Weight History: current wt appears to be significantly up from last November. ?effect of fluids vs weight gain .   Wt Readings from Last 10 Encounters:   07/16/22 (!) 171.2 kg (377 lb 6.8 oz)   07/13/22 (!) 175.9 kg (387 lb 12.8 oz)   11/17/21 (!) 152.4 kg (336 lb)   03/01/19 137.2 kg (302 lb 6.4 oz)   02/25/19 137.2 kg (302 lb 6.4 oz)   02/20/19 134.2 kg (295 lb 14.4 oz)   02/17/19 133.1 kg (293 lb 6.4 oz)   01/03/19 130.2 kg (287 lb)   07/28/18 113.4 kg (250 lb)   07/26/18 115.7 kg (255 lb)       LABS/MEDS:  K 3.2 (L)    Lasix 40 mg daily   Miralax&Senokot    MALNUTRITION:  Patient does not meet two of the criteria necessary for diagnosing malnutrition.     NUTRITION INTERVENTION:  Nutrition Diagnosis:  Predicted inadequate nutrient intake (energy/protein) related to prolonged admission, altered mental status, reduced appetite.     Implementation:  Nutrition Education:  Per Provider order if indicated  Medical food supplement: noted that Ensure Max protein is ordered - unable to send to to liquid thickness. Will change to send 1 Vital Cuisine daily.     FOLLOW UP/MONITORING:   Will re-evaluate in 7 - 10 days, or sooner, if re-consulted.    Nicole Mitchell RD, LD  [facility] [place], [facility], [facility], [facility]  Pager: [phone]  Weekend Pager: [phone]    "

## 2022-07-20 NOTE — PLAN OF CARE
Goal Outcome Evaluation:    Reason for Admission: R MCA CVA, post-TNK     Cognitive/Mentation: A/Ox self, very anxious.  Neuros/CMS: Intact ex slight L droop, generalized weakness.  VS: Stable. Tele: SR/SB   GI: BS +,  bowel meds given.  : PVR 0 AT 0500.   Pulmonary: LS diminished.  Pain:  Back, declined medication. Turned and repositioned.    Drains: None  Skin: Lymphedema to bilateral legs, lymph wraps in place.    Activity: Assist x 2-3 with Lift.  Diet: Pureed with mildly thickened liquids. Takes pills whole with thickened water.      Aggression Stoplight Score: Yellow  Therapies recs: LTC  Discharge: Pending     End of shift summary: Potassium replaced. Redraw this AM.  Seroquel for agitation/anxiety.

## 2022-07-20 NOTE — PROGRESS NOTES
Care Management Follow Up    Length of Stay (days): 7    Expected Discharge Date: 07/22/2022     Concerns to be Addressed:       Patient plan of care discussed at interdisciplinary rounds: Yes    Anticipated Discharge Disposition: LTC   Anticipated Discharge Services:    Anticipated Discharge DME:      Patient/family educated on Medicare website which has current facility and service quality ratings:    Education Provided on the Discharge Plan:  yes  Patient/Family in Agreement with the Plan:  yes    Referrals Placed by CM/SW:  Additional LTC referrals sent.  Private pay costs discussed:     Additional Information:    JOSE GUADALUPE Barakat  [facility]  Care Transitions  [phone]

## 2022-07-21 NOTE — PROGRESS NOTES
SPIRITUAL HEALTH SERVICES Progress Note  [facility]  75    SH attempted a length of stay visit however Shanthi was sleeping. SH will try again in the next day or two.     ALBERTO lao   Intern  Phone: [phone]

## 2022-07-21 NOTE — PROGRESS NOTES
[facility]    Medicine Progress Note - Hospitalist Service    Date of Admission:  7/13/2022    Assessment & Plan        Cristy Bailey is a 77 year old female with a PMH significant for morbid obesity, hypertension (not on/needing meds), and hyperlipidemia (not on/needing meds), who presented to [facility] 7/13/2022 with complete left-sided paralysis, left-sided facial droop, and difficulty speaking. CTA head remarkable for right MCA occlusion. She was given tenecteplase and subsequently transferred to [facility] 7/13/2022 for thrombectomy.      Acute right MCA ischemic stroke with edema and right to left midline shift  S/p tenecteplase and subsequent R MCA mechanical thrombectomy 7/13/2022  Presented to [facility] 7/13 with complete paralysis, left-sided facial droop, and aphasia. Code stroke initiated. Head CT 7/13 showed signs of an evolving R MCA infarct. CTA head 7/13 showed a right carotid terminus occlusion, right middle cerebral artery M1  segment occlusion with poor opacification of the more distal right MCA branches/poor collateral flow. CTA neck 7/13 negative for acute occlusions. Pt given tenecteplase 7/13 at 13:11. Noted to be agitated and was subsequently intubated given need for procedure.   Subsequently transferred to [facility] 7/13/2022 where she underwent above procedure.   7/14: Extubated. Head CT 7/14 showed evolution of acute infarct involving the R MCA distribution with increased swelling, cortical effacement, and new mild right-to-left midline shift; questionable hypoattenuation involving the bilateral occipital lobes which could be artifactual. Echo 7/14 showed LVEF 50%, grade 1 diastolic dysfunction; RV not well visualized but appeared mild-moderately dilated with global systolic function probably mildly reduced. Started ASA and atorvastatin.   7/15: Head CT 7/15 showed evolving R MCA stroke with areas of edema, overall stable.  - Continue to monitor on  telemetry  - Continue  mg daily, atorvastatin 40 mg daily  - Continue PT, OT  - Social work following for disposition.  Likely needs LTC   - BP goal < 140/90  - Will defer 30 day cardiac monitor to PCP or neurology  - Follow-up with [facility] in 6-8 weeks  - Management of dysphagia as below     Dysphagia due to CVA  Nutrition  Seen by SLP and noted with dysphagia.  Given boluses for low UO 7/15 and IVF's increased 7/16.  7/17, held on further IVF's given edema and concerns for volume overload (see below).  - Continue pureed diet (level 4), with mildly thick liquids (level 2)  - Advance diet as able per SLP.     Volume overload, diastolic CHF exacerbation.  Essential hypertension.  Not on/needing meds PTA.   * 7/14: Echo 7/14 showed LVEF 50%, grade 1 diastolic dysfunction; RV not well visualized but appeared mild-moderately dilated with global systolic function probably mildly reduced.   * 7/16: BP's soft in the 90's systolic. Given more IVF's.  * 7/17: BP's more elevated. Pt with significant pedal edema, worse than baseline. Given IV furosemide 60 mg with very good UO response.  * 7/18: Started on lisinopril. Continued on furosemide IV.  * 7/19: BP's improved and stable since  - Discontinued IV furosemide and started on furosemide 40 mg daily on 7/20  - Continue lisinopril 10 mg daily  - PRN IV hydralazine and PRN IV labetalol available  - Monitor i/o's, daily wts     Bilateral lower extremity chronic swelling with chronic venous stasis dermatitis and chronic skin changes  Hx of LLE cellulitis  Was hospitalized in 11/2021 for sepsis 2/2 to pneumonia and LLE cellulitis.   7/15: BLE noted to be patty and edematous, no unilateral leg swelling appreciated; LLE had patchy areas of erythema, no fluctuance, no painful areas with palpation; legs warm to touch.  7/17: Lymphedema consult ordered  - Keep bilateral lower extremities elevated while in bed or sitting  - Monitor clinically for signs of infection  - Continue  lymphedema wraps  - CHF management as noted.     DLD.  FLP 7/13: , HDL 47, LDL 91,   - Continue atorvastatin 40 mg daily     Lower back wound, suspect from pressure type injury  Acute lower back pain, suspect related to lower back wound  OA s/p bilateral hip replacements and h/o back pain  Chronic neck pain   Pt with significant back pain in the ICU requiring PRN IV hydromorphone.  PRN hydromorphone decreased 7/15 due to somnolence.  * MRI L-spine in 2018 showed multilevel degenerative changes with mild central stenosis. Patient has chronic back pain, reports having it over the last 2 years.   7/16: Noted with 5-6 cm by 2-3 cm wound with some swelling/fluid/blistering in a fold of her lower back, did not appear infected; this seemed to be the source of her pain. Padded dressing placed and WOC RN ordered.  7/18: Pain improved  - Continue local wound cares per WOC RN  - Continue regular repositioning  - Continue PRN acetaminophen, PRN IV hydromorphone 0.1 mg q4h; minimize opioids as able  - Adding on Voltaren gel PRN      Suspected meningioma left parietal region, incidentally seen on CT  Head CT 7/13 also showed a calcified extra-axial mass overlying the left parietal region measuring 1.4 cm, potentially representing a meningioma.  - Follow-up serial monitoring outpatient    Anemia, suspect dilutional component  Hgb normal on admit.  Hgb 11.5 on 7/16. No overt clinical signs of major bleeding.  - Monitor intermittently      Intertriginous dermatitis  - Continue clotrimazole powder, started on 7/117      Constipation  On 7/18, noted that no BM since admit. Started bowel regimen.  - Continue scheduled Miralax and Senokot-S     Hypokalemia, hypophosphatemia  Potassium and phosphorus low at times this hospitalization. Treated with replacement.  - Continue PRN K and phos replacement     Morbid obesity  Body mass index is 59.11 kg/m .  - Needs to pursue aggressive dietary and lifestyle  modifications                  Diet: Room Service  Pureed Diet (level 4) Mildly Thick (level 2) (Small single sips. Eat only when alert and upright at 80-90 degrees.)  Advance Diet as Tolerated  Room Service  Snacks/Supplements Adult: Other; Vital Cuisine; Between Meals    DVT Prophylaxis: Enoxaparin (Lovenox) SQ  C[place]quillo Catheter: Not present  Central Lines: None  Cardiac Monitoring: None  Code Status: Full Code      Disposition Plan      Expected Discharge Date: 07/22/2022    Discharge Delays: *Medically Ready for Discharge  Placement - LTC    Discharge Comments: LTC?        The patient's care was discussed with the Bedside Nurse, Care Coordinator/ and Patient.    Pro Huynh,   Hospitalist Service  [facility]  Securely message with the Vocera Web Console (learn more here)  Text page via [facility] Paging/Directory         Clinically Significant Risk Factors Present on Admission                      ______________________________________________________________________    Interval History   Patient seen and examined.  No acute events over night.  This morning the patient is complaining of neck pain that she states is chronic.  Describes it as an achy pain on the right side of the neck.  No fevers  No new neurologic deficits.  No difficulty with breathing.    Data reviewed today: I reviewed all medications, new labs and imaging results over the last 24 hours. I personally reviewed no images or EKG's today.    Physical Exam   Vital Signs: Temp: 98.6  F (37  C) Temp src: Axillary BP: 132/70 Pulse: 79   Resp: 20 SpO2: 95 % O2 Device: None (Room air)    Weight: 377 lbs 6.84 oz  General Appearance: Resting comfortably. NAD   Respiratory: Clear to auscultation.  No respiratory distress  Cardiovascular: RRR.  No obvious murmurs  GI: Soft.  Non-distended   Skin: No obvious rashes or cyanosis to exposed skin  Other: Alert.  No bony tenderness or lymphadenopathy noted on palpation of  the cervical spine     Data   Recent Labs   Lab 07/21/22  0906 07/20/22  1726 07/20/22  0823 07/19/22  1746 07/19/22  1635 07/19/22  0904 07/19/22  0800 07/18/22  1216 07/18/22  0721 07/17/22  1235 07/17/22  0712   WBC  --   --   --   --   --  7.2  --   --  8.3  --  7.2   HGB  --   --   --   --   --  12.8  --   --  12.3  --  11.7   MCV  --   --   --   --   --  90  --   --  91  --  94     --   --   --   --  271  --   --  251  --  223   NA  --   --  140  --   --  142  --   --  144  --  143   POTASSIUM  --  3.5 3.2* 3.2*   < > 2.8*  --    < > 3.0*  --  3.4   CHLORIDE  --   --  103  --   --  104  --   --  108  --  114*   CO2  --   --  34*  --   --  31  --   --  28  --  23   BUN  --   --  12  --   --  10  --   --  10  --  14   CR 0.56  --  0.59  --   --  0.58  --   --  0.59  --  0.51*   ANIONGAP  --   --  3  --   --  7  --   --  8  --  6   ASHIA  --   --  8.3*  --   --  8.6  --   --  8.2*  --  7.6*   GLC  --   --  91  --   --  105* 88   < > 99   < > 101*    < > = values in this interval not displayed.     No results found for this or any previous visit (from the past 24 hour(s)).

## 2022-07-21 NOTE — PROGRESS NOTES
Patient is alert to self with some confusion, vss, a-febrile, c/o neck pain, taking tylenol, takes pills crushed in apple sauce, on dysphagia level 4 diet with mild thick liquids, appetite poor, doesn't like thick liquids, feeds self with set up, incontinent of bowel and bladder, uses pure wick in bed, patient has wound on lower back, scabbing, open to air, skin tear on left calf, dressing CDI, lymph edema wraps in place, patient is anxious at times, prn seroquel available, neuros are intact except bilateral L/E weakness and slight L facial droop, discharge to TCU pending.

## 2022-07-21 NOTE — PLAN OF CARE
Pt here with R MCA CVA, post TNK. A&Ox1 to self only. Neuros intact ex confused, forgetful. BLE weakness. Pt is very anxious. LLE cellulitis. Bilateral lymphedema wraps in place. C/o low back pain, denied medication, pt repositioned with Ax2-3 lift. Last K+ 3.5, recheck this morning. Purewick in place. Pt bladder scanned for 180 this shift. PRN seroquel and one time dose of IV ativan given for anxiety, allowed pt to sleep intermittently between cares. Puree diet with mildly thick liquids, takes pills whole with thickened liquids or crushed in applesauce. Plan discharge to TCU pending placement.

## 2022-07-21 NOTE — PLAN OF CARE
Up A2 lift. A&O self only. Confused, forgetful. Anxious. BLE weakness. LLE cellulitis. Bilateral lymph wraps in place. C/o low back pain, denied medication, pt repositioned. K+ 3.5, recheck in morning. Purewick in place with small output. PRN seroquel given for anxiety, helpful. Plan discharge to TCU pending placement.

## 2022-07-21 NOTE — PLAN OF CARE
Goal Outcome Evaluation:  Alert, confused, forgetful, VSS on RA, report pain in the back, Tylenol x 1 given with minimal effect, purewick in use, no BM this shift, Bariatric commode ordered at 6.45pm, possible discharge to TCU, SW following, continue to monitor.

## 2022-07-21 NOTE — PLAN OF CARE
Goal Outcome Evaluation:        Pt here with R MCA CVA, post TNK. A&Ox1 to self only. Neuros intact ex confused, forgetful. BLE weakness. Pt is very anxious at times. LLE cellulitis. Bilateral lymphedema wraps in place.Puree diet with mildly thick liquids, takes pills whole with thickened liquids or crushed in applesauce. Pt refuse reposition,skin assessments and diaper change want to stay in the recliner.Plan discharge to TCU pending placement.

## 2022-07-22 NOTE — PLAN OF CARE
Shift Summary [id number]    Encouraged to get back to bed this evening. Incontinent of urine. Cleaned up when returned to bed, and Mepilex placed over back wound. Remains confused and yelling out for staff. Redirected often. Dilaudid and Seroquel given. Resting between cares.

## 2022-07-22 NOTE — PROGRESS NOTES
[facility]    Medicine Progress Note - Hospitalist Service    Date of Admission:  7/13/2022    Assessment & Plan          Cristy Bailey is a 77 year old female with a PMH significant for morbid obesity, hypertension (not on/needing meds), and hyperlipidemia (not on/needing meds), who presented to [facility] 7/13/2022 with complete left-sided paralysis, left-sided facial droop, and difficulty speaking. CTA head remarkable for right MCA occlusion. She was given tenecteplase and subsequently transferred to [facility] 7/13/2022 for thrombectomy.      Acute right MCA ischemic stroke with edema and right to left midline shift  S/p tenecteplase and subsequent R MCA mechanical thrombectomy 7/13/2022  Presented to [facility] 7/13 with complete paralysis, left-sided facial droop, and aphasia. Code stroke initiated. Head CT 7/13 showed signs of an evolving R MCA infarct. CTA head 7/13 showed a right carotid terminus occlusion, right middle cerebral artery M1  segment occlusion with poor opacification of the more distal right MCA branches/poor collateral flow. CTA neck 7/13 negative for acute occlusions. Pt given tenecteplase 7/13 at 13:11. Noted to be agitated and was subsequently intubated given need for procedure.   Subsequently transferred to [facility] 7/13/2022 where she underwent above procedure.   7/14: Extubated. Head CT 7/14 showed evolution of acute infarct involving the R MCA distribution with increased swelling, cortical effacement, and new mild right-to-left midline shift; questionable hypoattenuation involving the bilateral occipital lobes which could be artifactual. Echo 7/14 showed LVEF 50%, grade 1 diastolic dysfunction; RV not well visualized but appeared mild-moderately dilated with global systolic function probably mildly reduced. Started ASA and atorvastatin.   7/15: Head CT 7/15 showed evolving R MCA stroke with areas of edema, overall stable.  - Continue to monitor on  telemetry  - Continue  mg daily, atorvastatin 40 mg daily  - Continue PT, OT  - BP goal < 140/90  - Will defer 30 day cardiac monitor to PCP or neurology  - Follow-up with [facility] in 6-8 weeks  - Management of dysphagia as below  - Social work following for disposition.  Likely needs LTC      Dysphagia due to CVA  Nutrition  Seen by SLP and noted with dysphagia.  Given boluses for low UO 7/15 and IVF's increased 7/16.  7/17, held on further IVF's given edema and concerns for volume overload (see below).  - Continue pureed diet (level 4), with mildly thick liquids (level 2)  - Advance diet as able per SLP.     Volume overload, diastolic CHF exacerbation  Essential hypertension  Not on/needing meds PTA.   * 7/14: Echo 7/14 showed LVEF 50%, grade 1 diastolic dysfunction; RV not well visualized but appeared mild-moderately dilated with global systolic function probably mildly reduced.   * 7/16: BP's soft in the 90's systolic. Given more IVF's.  * 7/17: BP's more elevated. Pt with significant pedal edema, worse than baseline. Given IV furosemide 60 mg with very good UO response.  * 7/18: Started on lisinopril. Continued on furosemide IV.  * 7/19: BP's improved and stable since  - Discontinued IV furosemide and started on furosemide 40 mg daily on 7/20  - Continue lisinopril 10 mg daily  - PRN IV hydralazine and PRN IV labetalol available  - Monitor I/Os, daily wts     Bilateral lower extremity chronic swelling with chronic venous stasis dermatitis and chronic skin changes  Hx of LLE cellulitis  Was hospitalized in 11/2021 for sepsis 2/2 to pneumonia and LLE cellulitis.   7/15: BLE noted to be patty and edematous, no unilateral leg swelling appreciated; LLE had patchy areas of erythema, no fluctuance, no painful areas with palpation; legs warm to touch.  7/17: Lymphedema consult ordered  - Keep bilateral lower extremities elevated while in bed or sitting  - Monitor clinically for signs of infection  - Continue  lymphedema wraps  - CHF management as noted.     DLD.  FLP 7/13: , HDL 47, LDL 91,   - Continue atorvastatin 40 mg daily     Lower back wound, suspect from pressure type injury  Acute lower back pain, suspect related to lower back wound  OA s/p bilateral hip replacements and h/o back pain  Chronic neck pain   Pt with significant back pain in the ICU requiring PRN IV hydromorphone.  PRN hydromorphone decreased 7/15 due to somnolence.  * MRI L-spine in 2018 showed multilevel degenerative changes with mild central stenosis. Patient has chronic back pain, reports having it over the last 2 years.   7/16: Noted with 5-6 cm by 2-3 cm wound with some swelling/fluid/blistering in a fold of her lower back, did not appear infected; this seemed to be the source of her pain. Padded dressing placed and WOC RN ordered.  7/18: Pain improved  - Continue local wound cares per WOC RN  - Continue regular repositioning  - Continue PRN acetaminophen, PRN IV hydromorphone 0.1 mg q4h; minimize opioids as able  - Continue Voltaren gel PRN      Suspected meningioma left parietal region, incidentally seen on CT  Head CT 7/13 also showed a calcified extra-axial mass overlying the left parietal region measuring 1.4 cm, potentially representing a meningioma.  - Follow-up serial monitoring outpatient    Anemia, suspect dilutional component  Hgb normal on admit.  Hgb 11.5 on 7/16. No overt clinical signs of major bleeding.  - Monitor intermittently      Intertriginous dermatitis  - Continue clotrimazole powder, started on 7/117      Constipation  On 7/18, noted that no BM since admit. Started bowel regimen.  - Continue scheduled Miralax and Senokot-S     Hypokalemia, hypophosphatemia  Potassium and phosphorus low at times this hospitalization. Treated with replacement.  - Continue PRN K and phos replacement     Morbid obesity  Body mass index is 59.11 kg/m .  - Needs to pursue aggressive dietary and lifestyle modifications           Diet: Room Service  Advance Diet as Tolerated  Room Service  Snacks/Supplements Adult: Other; Vital Cuisine; Between Meals  Combination Diet Soft and Bite Sized Diet (level 6); Thin Liquids (level 0)    DVT Prophylaxis: Enoxaparin (Lovenox) SQ  C[place]quillo Catheter: Not present  Central Lines: None  Cardiac Monitoring: None  Code Status: Full Code      Disposition Plan      Expected Discharge Date: 07/23/2022    Discharge Delays: *Medically Ready for Discharge  Placement - LTC    Discharge Comments: LTC?        The patient's care was discussed with the Bedside Nurse, Care Coordinator/ and Patient.    Pro Huynh,   Hospitalist Service  [facility]  Securely message with the Vocera Web Console (learn more here)  Text page via [employer] Paging/Directory         Clinically Significant Risk Factors Present on Admission                      ______________________________________________________________________    Interval History   Patient seen and examined.  No acute events over night.  No fevers.  No new neurologic deficits.  Neck pain is improved.  No difficulty breathing.  Tolerating diet without nausea or vomiting    Data reviewed today: I reviewed all medications, new labs and imaging results over the last 24 hours. I personally reviewed no images or EKG's today.    Physical Exam   Vital Signs: Temp: 96.8  F (36  C) Temp src: Oral BP: 113/67 Pulse: 73   Resp: 18 SpO2: 92 % O2 Device: None (Room air)    Weight: 377 lbs 6.84 oz  General Appearance: Resting comfortably. NAD  Respiratory: Clear to auscultation.  No respiratory distress  Cardiovascular: RRR.  No obvious murmurs  GI: Soft.  Non-distended   Skin: No obvious rashes or cyanosis to exposed skin   Other: Alert.  No significant edema      Data   Recent Labs   Lab 07/21/22  0906 07/20/22  1726 07/20/22  0823 07/19/22  1746 07/19/22  1635 07/19/22  0904 07/19/22  0800 07/18/22  1216 07/18/22  0721 07/17/22  1235  07/17/22 0712   WBC  --   --   --   --   --  7.2  --   --  8.3  --  7.2   HGB  --   --   --   --   --  12.8  --   --  12.3  --  11.7   MCV  --   --   --   --   --  90  --   --  91  --  94     --   --   --   --  271  --   --  251  --  223   NA  --   --  140  --   --  142  --   --  144  --  143   POTASSIUM  --  3.5 3.2* 3.2*   < > 2.8*  --    < > 3.0*  --  3.4   CHLORIDE  --   --  103  --   --  104  --   --  108  --  114*   CO2  --   --  34*  --   --  31  --   --  28  --  23   BUN  --   --  12  --   --  10  --   --  10  --  14   CR 0.56  --  0.59  --   --  0.58  --   --  0.59  --  0.51*   ANIONGAP  --   --  3  --   --  7  --   --  8  --  6   ASHIA  --   --  8.3*  --   --  8.6  --   --  8.2*  --  7.6*   GLC  --   --  91  --   --  105* 88   < > 99   < > 101*    < > = values in this interval not displayed.     No results found for this or any previous visit (from the past 24 hour(s)).

## 2022-07-22 NOTE — PLAN OF CARE
"Pt stable neurologically- oriented to self only, able to follow simple commands, alters between periods of severe anxiety compounded by neck and back pain and periods of lethargy and somnolence arousing to voice.    Pt has extremely low tolerance for repositioning and seems unable to comprehend reasoning and long term goals, stating \"you are trying to kill me!\" and \"Why do you want to hurt me?\"  Multiple attempts reinforcing education and offering patient various options for mobility and comfort have been met with refusal and resistance, making turns, joesph- cleaning, and basic patient care very difficult. Additional anxiety management would be helpful.      Patient has ongoing rash in joesph-area as well as open breakdown on right back between skin folds. Interdry applied to skin folds, back cleaned with soap and water, skin prep applied per WOC note.  Perwick in place and draining. Voltaren applied to upper back and neck.     No BM since admission, pt adamantly refusing Jill-Lax stating \"it tastes too gross!\" will continue to attempt administration. MD aware of no BM.  SO Leo updated at bedside. Questions encouraged and answered accordingly. VSS  "

## 2022-07-22 NOTE — PLAN OF CARE
Pt here with R MCA CVA, post TNK. A&Ox1 to self only. Neuros intact ex confused, forgetful. BLE weakness. Pt very anxious and agitated with staff at times, frequently calling out, one time dose IV ativan given with much improvement. Pt has prn Seroquel available q6h. LLE cellulitis. Bilateral lymphedema wraps in place. C/o low back pain, prn tylenol given. Pt repositioned with Ax2-3 lift. Puree diet with mildly thick liquids, takes pills crushed in applesauce. Plan discharge to TCU pending placement.

## 2022-07-22 NOTE — CONSULTS
Stroke Education Note    The following information has been reviewed with the family: (Daughter, Kaylynn)    1. Warning signs of stroke    2. Calling 911 if having warning signs of stroke    3. All modifiable risk factors: hypertension, CAD, atrial fib, diabetes, hypercholesterolemia, smoking, substance abuse, diet, physical inactivity, obesity, sleep apnea.    4. Patient's risk factors for stroke which include: obesity, HTN, HLD, unhealthy diet, physical inactivity.    5. Follow-up plan for after discharge    6. Discharge medications which include: ASA, Lipitor, Lasix, lisinopril    In addition, the above information was given to the family in writing as a part of the [facility] Stroke Class Handout.    Learner's response to risk factors / lifestyle modification education: NA - Precontemplative     Arielle Gonzalez RN

## 2022-07-22 NOTE — PROGRESS NOTES
Care Management Follow Up    Length of Stay (days): 9    Expected Discharge Date: 07/23/2022     Concerns to be Addressed:       Patient plan of care discussed at interdisciplinary rounds: Yes    Anticipated Discharge Disposition: Home     Anticipated Discharge Services:    Anticipated Discharge DME:      Patient/family educated on Medicare website which has current facility and service quality ratings:    Education Provided on the Discharge Plan:    Patient/Family in Agreement with the Plan:      Referrals Placed by CM/SW:    Private pay costs discussed: Not applicable    Additional Information:  SW received phone call from patient's daughter, Kaylynn, voicing concerns for patient returning home as patient resides in a mobile home with her SO Leo yoana Kaylynn does not feel this a safe and healthful environment for patient to live in and has observed that patient needs quite a bit of assistance and her wheelchair does not fit through many of the doorways in the mobile home. SW explained that discussions have been had with patient's SO Leo and he is aware that patient's need are too great to return home and LTC placement is being pursued. SW to follow up with Leo to make sure patient and Leo are still in agreement with this plan. SW will continue to follow.       Claudia Marie

## 2022-07-23 NOTE — PROGRESS NOTES
[facility]    Medicine Progress Note - Hospitalist Service    Date of Admission:  7/13/2022    Assessment & Plan        Cristy Bailey is a 77 year old female with a PMH significant for morbid obesity, hypertension (not on/needing meds), and hyperlipidemia (not on/needing meds), who presented to [facility] 7/13/2022 with complete left-sided paralysis, left-sided facial droop, and difficulty speaking. CTA head remarkable for right MCA occlusion. She was given tenecteplase and subsequently transferred to [facility] 7/13/2022 for thrombectomy.      Acute right MCA ischemic stroke with edema and right to left midline shift  S/p tenecteplase and subsequent R MCA mechanical thrombectomy 7/13/2022  Presented to [facility] 7/13 with complete paralysis, left-sided facial droop, and aphasia. Code stroke initiated. Head CT 7/13 showed signs of an evolving R MCA infarct. CTA head 7/13 showed a right carotid terminus occlusion, right middle cerebral artery M1  segment occlusion with poor opacification of the more distal right MCA branches/poor collateral flow. CTA neck 7/13 negative for acute occlusions. Pt given tenecteplase 7/13 at 13:11. Noted to be agitated and was subsequently intubated given need for procedure.   Subsequently transferred to [facility] 7/13/2022 where she underwent above procedure.   7/14: Extubated. Head CT 7/14 showed evolution of acute infarct involving the R MCA distribution with increased swelling, cortical effacement, and new mild right-to-left midline shift; questionable hypoattenuation involving the bilateral occipital lobes which could be artifactual. Echo 7/14 showed LVEF 50%, grade 1 diastolic dysfunction; RV not well visualized but appeared mild-moderately dilated with global systolic function probably mildly reduced. Started ASA and atorvastatin.   7/15: Head CT 7/15 showed evolving R MCA stroke with areas of edema, overall stable.  - Continue to monitor on  telemetry  - Continue  mg daily, atorvastatin 40 mg daily  - Continue PT, OT  - BP goal < 140/90  - Will defer 30 day cardiac monitor to PCP or neurology  - Follow-up with [facility] in 6-8 weeks  - Management of dysphagia as below  - Social work following for disposition.  Likely needs LTC.     Dysphagia due to CVA  Nutrition  Seen by SLP and noted with dysphagia.  Given boluses for low UO 7/15 and IVF's increased 7/16.  7/17, held on further IVF's given edema and concerns for volume overload (see below).  - Continue pureed diet (level 4), with mildly thick liquids (level 2)  - Advance diet as able per SLP.     Volume overload, diastolic CHF exacerbation  Essential hypertension  Not on/needing meds PTA.   * 7/14: Echo 7/14 showed LVEF 50%, grade 1 diastolic dysfunction; RV not well visualized but appeared mild-moderately dilated with global systolic function probably mildly reduced.   * 7/16: BP's soft in the 90's systolic. Given more IVF's.  * 7/17: BP's more elevated. Pt with significant pedal edema, worse than baseline. Given IV furosemide 60 mg with very good UO response.  * 7/18: Started on lisinopril. Continued on furosemide IV.  * 7/19: BP's improved and stable since  - Discontinued IV furosemide and started on furosemide 40 mg daily on 7/20  - Continue lisinopril 10 mg daily  - PRN IV hydralazine and PRN IV labetalol available  - Monitor I/Os, daily wts     Bilateral lower extremity chronic swelling with chronic venous stasis dermatitis and chronic skin changes  Hx of LLE cellulitis  Was hospitalized in 11/2021 for sepsis 2/2 to pneumonia and LLE cellulitis.   7/15: BLE noted to be patty and edematous, no unilateral leg swelling appreciated; LLE had patchy areas of erythema, no fluctuance, no painful areas with palpation; legs warm to touch.  7/17: Lymphedema consult ordered  - Keep bilateral lower extremities elevated while in bed or sitting  - Monitor clinically for signs of infection  - Continue  lymphedema wraps  - CHF management as noted.     DLD  FLP 7/13: , HDL 47, LDL 91,   - Continue atorvastatin 40 mg daily     Lower back wound, suspect from pressure type injury  Acute lower back pain, suspect related to lower back wound  OA s/p bilateral hip replacements and h/o back pain  Chronic neck pain   Pt with significant back pain in the ICU requiring PRN IV hydromorphone.  PRN hydromorphone decreased 7/15 due to somnolence.  * MRI L-spine in 2018 showed multilevel degenerative changes with mild central stenosis. Patient has chronic back pain, reports having it over the last 2 years.   7/16: Noted with 5-6 cm by 2-3 cm wound with some swelling/fluid/blistering in a fold of her lower back, did not appear infected; this seemed to be the source of her pain. Padded dressing placed and WOC RN ordered.  7/18: Pain improved  - Continue local wound cares per WOC RN  - Continue regular repositioning  - Continue PRN acetaminophen, PRN IV hydromorphone 0.1 mg q4h; minimize opioids as able  - Continue Voltaren gel PRN      Suspected meningioma left parietal region, incidentally seen on CT  Head CT 7/13 also showed a calcified extra-axial mass overlying the left parietal region measuring 1.4 cm, potentially representing a meningioma.  - Follow-up serial monitoring outpatient     Anemia, suspect dilutional component  Hgb normal on admit.  Hgb 11.5 on 7/16. No overt clinical signs of major bleeding.  - Monitor intermittently      Intertriginous dermatitis  - Continue clotrimazole powder, started on 7/117      Constipation  On 7/18, noted that no BM since admit. Started bowel regimen.  - Continue scheduled Miralax and Senokot-S     Hypokalemia, hypophosphatemia  Potassium and phosphorus low at times this hospitalization. Treated with replacement.  - Continue PRN K and phos replacement     Morbid obesity  Body mass index is 59.11 kg/m .  - Needs to pursue aggressive dietary and lifestyle modifications        Diet:  Room Service  Advance Diet as Tolerated  Room Service  Snacks/Supplements Adult: Other; Vital Cuisine; Between Meals  Combination Diet Soft and Bite Sized Diet (level 6); Thin Liquids (level 0)    DVT Prophylaxis: Enoxaparin (Lovenox) SQ  [place] Catheter: Not present  Central Lines: None  Cardiac Monitoring: None  Code Status: Full Code      Disposition Plan      Expected Discharge Date: 07/25/2022    Discharge Delays: *Medically Ready for Discharge  Placement - LTC    Discharge Comments: LTC?        The patient's care was discussed with the Bedside Nurse and Patient.    Ramu Barcenas MD  Hospitalist Service  [facility]  Securely message with the Vocera Web Console (learn more here)  Text page via [employer] Paging/Directory         Clinically Significant Risk Factors Present on Admission                      ______________________________________________________________________    Interval History   Care assumed today.  Patient seen and examined midday.  No new issues or complaints-denies shortness of breath, fever/chills, or pain.  Awaiting disposition options.    Data reviewed today: I reviewed all medications, new labs and imaging results over the last 24 hours. I personally reviewed no images or EKG's today.    Physical Exam   Vital Signs: Temp: 99  F (37.2  C) Temp src: Axillary BP: 120/58 Pulse: 77   Resp: 18 SpO2: 90 % O2 Device: None (Room air) Oxygen Delivery: 2 LPM  Weight: 377 lbs 6.84 oz    General Appearance:  Resting comfortably. NAD  Respiratory: Clear to auscultation.  No respiratory distress  Cardiovascular: RRR.  No obvious murmurs  GI: Soft.  Non-distended   Skin: No obvious rashes or cyanosis to exposed skin   Other:  Alert.  No significant edema      Data   Recent Labs   Lab 07/23/22  1010 07/21/22  0906 07/20/22  1726 07/20/22  0823 07/19/22  1635 07/19/22  0904 07/19/22  0800 07/18/22  1216 07/18/22  0721 07/17/22  1235 07/17/22  0712   WBC  --   --   --   --   --  7.2   --   --  8.3  --  7.2   HGB  --   --   --   --   --  12.8  --   --  12.3  --  11.7   MCV  --   --   --   --   --  90  --   --  91  --  94   PLT  --  262  --   --   --  271  --   --  251  --  223   NA  --   --   --  140  --  142  --   --  144  --  143   POTASSIUM 3.2*  --  3.5 3.2*   < > 2.8*  --    < > 3.0*  --  3.4   CHLORIDE  --   --   --  103  --  104  --   --  108  --  114*   CO2  --   --   --  34*  --  31  --   --  28  --  23   BUN  --   --   --  12  --  10  --   --  10  --  14   CR  --  0.56  --  0.59  --  0.58  --   --  0.59  --  0.51*   ANIONGAP  --   --   --  3  --  7  --   --  8  --  6   ASHIA  --   --   --  8.3*  --  8.6  --   --  8.2*  --  7.6*   GLC  --   --   --  91  --  105* 88   < > 99   < > 101*    < > = values in this interval not displayed.     No results found for this or any previous visit (from the past 24 hour(s)).

## 2022-07-23 NOTE — PROVIDER NOTIFICATION
MD Notification    Notified Person: MD    Notified Person Name: Susan    Notification Date/Time: 7/22/2022 0791    Notification Interaction: paged    Purpose of Notification:    No PRNs available, tylenol, Seroquel, and IV dilaudid given. Pt. Continues to yell out for help. Appears anxious, doesn't want staff to leave the room. Previously received 1 time dose of ativan.    Orders Received:    Ativan ordered    Comments:    Held off on giving ativan d/t Pt. sleeping, pt. Awoke anxious, yelling out, ativan given

## 2022-07-23 NOTE — PROVIDER NOTIFICATION
"Paged Dr. Ramu hardy \"pt is continuously yelling for help, she is anxious, agitated, and restless. can you please modify the Ativan so that we can give it more often. pt also continues to complain of right upper back pain. can you please modify her meds to better manager her symptoms. pt does not have any neuro changes at this time (1752). Thanks!\" MD gave writer written order for oxycodone 5mg PO once and lorazepam .5mg IV once. Order was placed.   "

## 2022-07-23 NOTE — PLAN OF CARE
"Pt here with R MCA CVA. A&Ox1-2 to self and occasionally place, forgetful. Neuros: intact  Ex. confused, generalized weakness BUE 4/5 BLE 3/5 strength, slight L facial droop. Pt is very anxious and agitated with staff, frequently yelling out, PRN Seroquel and 1 time dose of ativan given. C/o neck and back pain, PRN ice packs and tylenol given, schedule Voltaren cream applied. LLE cellulitis. Bilateral lymphedema wraps in place. pt repositioned with Ax2-3 lift, low tolerance for repositioning, repeatdly asks staff \"Why do you want to hurt me?\", education provided, no evidence of learning, continue to reinforce educaiton.  Purewick in place. Pt bladder scanned for 12mls this shift. Soft bite sized, thin liquid diet, takes pills whole. PIV SL. Plan discharge to TCU pending placement. Continue to monitor       "

## 2022-07-23 NOTE — PLAN OF CARE
Goal Outcome Evaluation:    Reason for Admission: Acute right MCA ischemic stroke with edema and right to left midline shift. S/p tenecteplase and subsequent R MCA mechanical thrombectomy 7/13/2022    Code status: FC  Seizure or isolation precaution: N/A   Cognitive/Mentation: A/O to self only, confused and forgetful   Use of CPAP: No  Neuros/CMS: no new changes, neuro status is at baseline. see in flow sheet for details. Intact ex. Pt has slight left droop, weak dorsi/plantar flexion, upper ext. 4/5 and lower ext. 3/5, UTP heel to shin.   CIWA Protocol: NA  VS: stable   Tele: N/A   GI: BS active x4, passing flatus, incontinent.  : voiding w/o difficulty, incontinent.  Use of snyder, external catheter, or urinal: pt uses external catheter. It was changed. Urine output was normal and adequate   Pulmonary: LS clear   Pain: PRN dilaudid IV 0.1mg was given for low back pain  Critical Labs to Monitor: N/A  Electrolyte Replacement Protocol: K+, Mg, and PO was normal, recheck lab orders placed   Use of oxygen: N/A   Heparin or other drips: N/A     BG checks: N/A  Tests, MRI, CT, SEDRICK, Echo: No tests pending currently    Drains: saline locked   Skin: right back wound is open to air. Pt has rash under skin folds  Edema: Bilateral lower extremity chronic swelling with chronic venous stasis dermatitis and chronic skin changes. +3 edema, pt has lymphedema raps on legs   Hx of LLE cellulitis  Surgical site: N/A  Activity: Assist x 2 with lift   Diet: soft bite size diet  with thin  liquids. Takes pills whole one at a time   Tube Feeding: N/A   Discharge: pending. TCU. Pt needs 30 day cardiac monitor at discharge.   Writer told Dr. Ramu hardy to increase or adjust pt's pain meds and anxiety meds due to pt being anxious, agitated, and restless continuously. MD said that he plan to increase her seroquel. He also ordered PRN ativan to be given at bedtime.

## 2022-07-24 NOTE — PROGRESS NOTES
Care Management Follow Up    Length of Stay (days): 11    Expected Discharge Date: 07/25/2022     Concerns to be Addressed:       Patient plan of care discussed at interdisciplinary rounds: Yes    Anticipated Discharge Disposition: Home     Anticipated Discharge Services:    Anticipated Discharge DME:      Patient/family educated on Medicare website which has current facility and service quality ratings:    Education Provided on the Discharge Plan:    Patient/Family in Agreement with the Plan:      Referrals Placed by CM/SW:    Private pay costs discussed: Not applicable    Additional Information:  Writer placed call to patient's significant other Leo to discuss discharge plans. Writer to inquire if he is still in agreement with patient seeking LTC placement as patient's daughter did not think he would in agreement with this as she feels he can enable patient. Writer unable to reach Leo and unable to leave voicemail. SW to continue to reach out to significant other to discuss goals for discharge. Previous SW note stated significant other is in understanding patient is requiring a higher level of care than he can provide and writer wanting to discuss.    Writer followed up with existing LTC referrals - no admissions staff in on weekend.   - [facility] - left voicemail  - [facility] - left voicemail  - [facility] - left voicemail    Addendum 1211: Writer met with patient's significant other Leo at bedside. Patient asleep. Writer reviewed therapy recommendations for LTC and reviewed private pay costs of this. Leo noted that he anticipates patient will want to return home but he acknowledges that if she is unable to use her legs and ambulate/transfer, he will not physically be able to care for her independently. Leo noted he would like for patient to continue to rehab and see if she is able to gain strength with the goal for more independence. Writer noted and reaching out to therapy to discuss their  recommendations/if reasonable to seek TCU. Patient is currently lift dependent and dependent for mobility. Leo is okay with referrals still out and would like placement in [place] should that be the route necessary. Leo also noted they are not legally , he is not able to pay the private costs of LTC. Writer reviewed medical assistance and process and received permission from Leo to make referral to [facility] for MA application.     Addendum: Writer spoke with physical therapist to discuss discharge recommendations/rehab potential. PT reports patient will refuse to participate in therapy/has no desire to participate therefore because of this has little rehab potential. PT states if a lift were present in home she could return home however patient lives in mobile home. Continues LTC recommendation. Writer attempted to meet with S.O. after this - left hospital and not able to reach on home phone.     Will continue to follow.    FADUMO Juarez, LGSW    [facility]

## 2022-07-24 NOTE — PLAN OF CARE
Shift summary [id number]:  Neuro:  Anxiety episodes with activity and transitions.  Majority of day with calm, cooperation. Improving.  Short term memory loss.  Speech fluent. Neuro exam stable with mild confusion  Disoriented to date, time and place.    CV:  VSS. Pitting edema to LE.  Lymph wraps redone with PT today after wounds cleansed.   PULM:  Room air  GI/: Tolerating > 50% diet well.  Tray set up and encouragement given.  Attention to preferences made at every opportunity.  Reduction in anxiety resulted. Purewick when in bed.  NO BM since admission. Miralax, Senna given.  Flatus passed.  Discussed with Dr. Barcenas.        Goal Outcome Evaluation:    Plan of Care Reviewed With: patient     Overall Patient Progress: improving    Outcome Evaluation: Improved mobility today. Up to partial stand with PT/RN from chair position. Lift to chair continued.  anxiety and pain with movement.  Changed to oral dosing for pain management, multimodal approach with cold, positioning.  Wound consult placed for R Lower back open draining wound suspect cellulitis developing.

## 2022-07-24 NOTE — PROGRESS NOTES
[facility]    Medicine Progress Note - Hospitalist Service    Date of Admission:  7/13/2022    Assessment & Plan        Cristy Bailey is a 77 year old female with a PMH significant for morbid obesity, hypertension (not on/needing meds), and hyperlipidemia (not on/needing meds), who presented to [facility] 7/13/2022 with complete left-sided paralysis, left-sided facial droop, and difficulty speaking. CTA head remarkable for right MCA occlusion. She was given tenecteplase and subsequently transferred to [facility] 7/13/2022 for thrombectomy.      Acute right MCA ischemic stroke with edema and right to left midline shift  S/P tenecteplase and subsequent R MCA mechanical thrombectomy 7/13/2022  Presented to [facility] 7/13 with complete paralysis, left-sided facial droop, and aphasia. Code stroke initiated. Head CT 7/13 showed signs of an evolving R MCA infarct. CTA head 7/13 showed a right carotid terminus occlusion, right middle cerebral artery M1  segment occlusion with poor opacification of the more distal right MCA branches/poor collateral flow. CTA neck 7/13 negative for acute occlusions. Pt given tenecteplase 7/13 at 13:11. Noted to be agitated and was subsequently intubated given need for procedure.   Subsequently transferred to [facility] 7/13/2022 where she underwent above procedure.   7/14: Extubated. Head CT 7/14 showed evolution of acute infarct involving the R MCA distribution with increased swelling, cortical effacement, and new mild right-to-left midline shift; questionable hypoattenuation involving the bilateral occipital lobes which could be artifactual. Echo 7/14 showed LVEF 50%, grade 1 diastolic dysfunction; RV not well visualized but appeared mild-moderately dilated with global systolic function probably mildly reduced. Started ASA and atorvastatin.   7/15: Head CT 7/15 showed evolving R MCA stroke with areas of edema, overall stable.  - Continue to monitor on  telemetry  - Continue  mg daily, atorvastatin 40 mg daily  - Continue PT, OT  - BP goal < 140/90  - Will defer 30 day cardiac monitor to PCP or neurology  - Follow-up with [facility] in 6-8 weeks  - Management of dysphagia as below  - Social work following for disposition.  Likely needs LTC.     Dysphagia due to CVA  Nutrition  Seen by SLP and noted with dysphagia.  Given boluses for low UO 7/15 and IVF's increased 7/16.  7/17, held on further IVF's given edema and concerns for volume overload (see below).  - Continue pureed diet (level 4), with mildly thick liquids (level 2)  - Advance diet as able per SLP.     Volume overload, diastolic CHF exacerbation  Essential hypertension  Not on/needing meds PTA.   * 7/14: Echo 7/14 showed LVEF 50%, grade 1 diastolic dysfunction; RV not well visualized but appeared mild-moderately dilated with global systolic function probably mildly reduced.   * 7/16: BP's soft in the 90's systolic. Given more IVF's.  * 7/17: BP's more elevated. Pt with significant pedal edema, worse than baseline. Given IV furosemide 60 mg with very good UO response.  * 7/18: Started on lisinopril. Continued on furosemide IV.  * 7/19: BP's improved and stable since  - Discontinued IV furosemide and started on furosemide 40 mg daily on 7/20  - Continue lisinopril 10 mg daily  - PRN IV hydralazine and PRN IV labetalol available  - Monitor I/Os, daily wts     Bilateral lower extremity chronic swelling with chronic venous stasis dermatitis and chronic skin changes  Hx of LLE cellulitis  Was hospitalized in 11/2021 for sepsis 2/2 to pneumonia and LLE cellulitis.   7/15: BLE noted to be patty and edematous, no unilateral leg swelling appreciated; LLE had patchy areas of erythema, no fluctuance, no painful areas with palpation; legs warm to touch.  7/17: Lymphedema consult ordered  - Keep bilateral lower extremities elevated while in bed or sitting  - Monitor clinically for signs of infection  - Continue  lymphedema wraps  - CHF management as noted.     DLD  FLP 7/13: , HDL 47, LDL 91,   - Continue atorvastatin 40 mg daily     Lower back wound, suspect from pressure type injury  Acute lower back pain, suspect related to lower back wound  OA s/p bilateral hip replacements and h/o back pain  Chronic neck pain   Pt with significant back pain in the ICU requiring PRN IV hydromorphone.  PRN hydromorphone decreased 7/15 due to somnolence.  * MRI L-spine in 2018 showed multilevel degenerative changes with mild central stenosis. Patient has chronic back pain, reports having it over the last 2 years.   7/16: Noted with 5-6 cm by 2-3 cm wound with some swelling/fluid/blistering in a fold of her lower back, did not appear infected; this seemed to be the source of her pain. Padded dressing placed and WOC RN ordered.  7/18: Pain improved  - Continue local wound cares per WOC RN  - Continue regular repositioning  - Continue PRN acetaminophen, PRN IV hydromorphone 0.1 mg q4h; minimize opioids as able  - Continue Voltaren gel PRN      Suspected meningioma left parietal region, incidentally seen on CT  Head CT 7/13 also showed a calcified extra-axial mass overlying the left parietal region measuring 1.4 cm, potentially representing a meningioma.  - Follow-up serial monitoring outpatient     Anemia, suspect dilutional component  Hgb normal on admit.  Hgb 11.5 on 7/16. No overt clinical signs of major bleeding.  - Monitor intermittently      Intertriginous dermatitis  - Continue clotrimazole powder, started on 7/117      Constipation  On 7/18, noted that no BM since admit. Started bowel regimen.  - Continue scheduled Miralax and Senokot-S     Hypokalemia, hypophosphatemia  Potassium and phosphorus low at times this hospitalization. Treated with replacement.  - Continue PRN K and phos replacement     Morbid obesity  Body mass index is 59.11 kg/m .  - Needs to pursue aggressive dietary and lifestyle  modifications         Diet: Room Service  Advance Diet as Tolerated  Room Service  Snacks/Supplements Adult: Other; Vital Cuisine; Between Meals  Combination Diet Soft and Bite Sized Diet (level 6); Thin Liquids (level 0)    DVT Prophylaxis: Enoxaparin (Lovenox) SQ  [place] Catheter: Not present  Central Lines: None  Cardiac Monitoring: None  Code Status: Full Code      Disposition Plan     Expected Discharge Date: 07/25/2022    Discharge Delays: *Medically Ready for Discharge  Placement - LTC    Discharge Comments: LTC?        The patient's care was discussed with the Bedside Nurse and Patient.    Ramu Barcenas MD  Hospitalist Service  [facility]  Securely message with the Vocera Web Console (learn more here)  Text page via [employer] Paging/[employer]         Clinically Significant Risk Factors Present on Admission                      ______________________________________________________________________    Interval History   Patient seen and examined this morning.  She is again up in the chair able to converse a bit but drowsy at times.  She denies any new pain.  She is not short of breath and denies fevers or chills.  TCU disposition is awaited.  Agitated at times.    Data reviewed today: I reviewed all medications, new labs and imaging results over the last 24 hours. I personally reviewed no images or EKG's today.    Physical Exam   Vital Signs: Temp: 99.1  F (37.3  C) Temp src: Oral BP: 121/53 Pulse: 71   Resp: 20 SpO2: 95 % O2 Device: Nasal cannula Oxygen Delivery: 2 LPM  Weight: 385 lbs 0 oz    Gen: NAD, pleasant  HEENT: EOMI, MMM  Resp: no focal crackles,  no wheezes, no increased work of resp  CV: S1S2 heard, reg rhythm, reg rate  Abdo: soft, nontender, nondistended, bowel sounds present  Ext: calves nontender, well perfused  Neuro: aa, drowsy but able to converse fairly unremarkably, CN grossly intact, moving all 4 ext      Data   Recent Labs   Lab 07/24/22  0507 07/23/22 1958  07/23/22  1010 07/21/22  0906 07/20/22  1726 07/20/22  0823 07/19/22  1635 07/19/22  0904 07/19/22  0800 07/18/22  1216 07/18/22  0721   WBC  --   --   --   --   --   --   --  7.2  --   --  8.3   HGB  --   --   --   --   --   --   --  12.8  --   --  12.3   MCV  --   --   --   --   --   --   --  90  --   --  91     --   --  262  --   --   --  271  --   --  251   NA  --   --   --   --   --  140  --  142  --   --  144   POTASSIUM 4.0 3.4 3.2*  --    < > 3.2*   < > 2.8*  --    < > 3.0*   CHLORIDE  --   --   --   --   --  103  --  104  --   --  108   CO2  --   --   --   --   --  34*  --  31  --   --  28   BUN  --   --   --   --   --  12  --  10  --   --  10   CR 0.58  --   --  0.56  --  0.59  --  0.58  --   --  0.59   ANIONGAP  --   --   --   --   --  3  --  7  --   --  8   ASHIA  --   --   --   --   --  8.3*  --  8.6  --   --  8.2*   GLC  --   --   --   --   --  91  --  105* 88   < > 99    < > = values in this interval not displayed.     No results found for this or any previous visit (from the past 24 hour(s)).

## 2022-07-24 NOTE — PLAN OF CARE
Pt here with R MCA CVA. A&Ox2 disoriented to time and situation, forgetful.  Neuros: intact  Ex. confused, generalized weakness, BUE 4/5 BLE 3/5 strength, slight L facial droop. C/o back pain, repositioning provided, cold packs applied, declining tylenol at times. Bilateral lymphedema wraps in place. pt repositioned with Ax2-3 lift, low tolerance for repositioning. Purewick in place. Soft bite sized, thin liquid diet, takes pills whole. PIV SL. Pt. Slept between cares.  Plan discharge to TCU pending placement. Continue to monitor

## 2022-07-25 NOTE — PLAN OF CARE
Goal Outcome Evaluation:    Plan of Care Reviewed With: patient     Overall Patient Progress: no change    Outcome Evaluation: Pain remained wel controlled overnight after prn for breakthrough and patient slept well between cares. Encouraged movement from chair to bed for sleep, pt hesitant still due to pain but able to transfer well with lift. Pt continues to call out and make frequent requests from staff when anxious, HS prn ativian effective for helping with anxiety. Patient awaiting plan for LTC for discharge, SW following.

## 2022-07-25 NOTE — PROGRESS NOTES
[facility]    Hospitalist Progress Note      Assessment & Plan   Cristy Bailey is a 77 year old female with a PMH significant for morbid obesity, hypertension (not on/needing meds), and hyperlipidemia (not on/needing meds), who presented to [facility] 7/13/2022 with complete left-sided paralysis, left-sided facial droop, and difficulty speaking. CTA head remarkable for right MCA occlusion. She was given tenecteplase and subsequently transferred to [facility] 7/13/2022 for thrombectomy.     Acute right MCA ischemic stroke with edema and right to left midline shift  S/P tenecteplase and subsequent R MCA mechanical thrombectomy 7/13/2022  Presented to [facility] 7/13 with complete paralysis, left-sided facial droop, and aphasia. Code stroke initiated. Head CT 7/13 showed signs of an evolving R MCA infarct. CTA head 7/13 showed a right carotid terminus occlusion, right middle cerebral artery M1  segment occlusion with poor opacification of the more distal right MCA branches/poor collateral flow. CTA neck 7/13 negative for acute occlusions. Pt given tenecteplase 7/13 at 13:11. Noted to be agitated and was subsequently intubated given need for procedure.   Subsequently transferred to [facility] 7/13/2022 where she underwent above procedure.   7/14: Extubated. Head CT 7/14 showed evolution of acute infarct involving the R MCA distribution with increased swelling, cortical effacement, and new mild right-to-left midline shift; questionable hypoattenuation involving the bilateral occipital lobes which could be artifactual. Echo 7/14 showed LVEF 50%, grade 1 diastolic dysfunction; RV not well visualized but appeared mild-moderately dilated with global systolic function probably mildly reduced. Started ASA and atorvastatin.   7/15: Head CT 7/15 showed evolving R MCA stroke with areas of edema, overall stable.  - Continue telemetry  - Continue  mg daily, atorvastatin 40 mg daily  -  Continue PT, OT  - BP goal < 140/90  - Will defer 30 day cardiac monitor to PCP or neurology  - Follow-up with [facility] in 6-8 weeks  - Management of dysphagia as below  - Social work following for disposition.  Likely TCU     Dysphagia due to CVA  Nutrition  Seen by SLP and noted with dysphagia. Required some intermittent fluid boluses.  - Continue pureed diet (level 4), with mildly thick liquids (level 2)  - Advance diet as able per SLP.     Volume overload, diastolic CHF exacerbation  Essential hypertension  Not on/needing meds PTA.   * 7/14: Echo 7/14 showed LVEF 50%, grade 1 diastolic dysfunction; RV not well visualized but appeared mild-moderately dilated with global systolic function probably mildly reduced.   * 7/16: BP's soft in the 90's systolic. Given more IVF's.  * 7/17: BP's more elevated. Pt with significant pedal edema, worse than baseline. Given IV furosemide 60 mg with very good UO response.  * 7/18: Started on lisinopril. Continued on furosemide IV.  * 7/19: BP's improved and stable since  - Discontinued IV furosemide and started on furosemide 40 mg daily on 7/20  - Continue lisinopril 10 mg daily  - PRN IV hydralazine and PRN IV labetalol available  - Monitor I/Os, daily wts     Bilateral lower extremity chronic swelling with chronic venous stasis dermatitis and chronic skin changes  Hx of LLE cellulitis  Was hospitalized in 11/2021 for sepsis 2/2 to pneumonia and LLE cellulitis.   7/15: BLE noted to be patty and edematous, no unilateral leg swelling appreciated; LLE had patchy areas of erythema, no fluctuance, no painful areas with palpation; legs warm to touch.  7/17: Lymphedema consult ordered  - Keep bilateral lower extremities elevated while in bed or sitting  - Continue lymphedema wraps  - CHF management as noted.     DLD  FLP 7/13: , HDL 47, LDL 91,   - Continue atorvastatin 40 mg daily     Lower back wound, suspect from pressure type injury  Acute lower back pain, suspect related to  lower back wound  OA s/p bilateral hip replacements and h/o back pain  Chronic neck pain   Pt with significant back pain in the ICU requiring PRN IV hydromorphone.  PRN hydromorphone decreased 7/15 due to somnolence.  * MRI L-spine in 2018 showed multilevel degenerative changes with mild central stenosis. Patient has chronic back pain, reports having it over the last 2 years.   7/16: Noted with 5-6 cm by 2-3 cm wound with some swelling/fluid/blistering in a fold of her lower back, did not appear infected; this seemed to be the source of her pain. Padded dressing placed and WOC RN ordered.  7/18: Pain improved  - Continue local wound cares per WOC RN  - Continue regular repositioning  - Continue PRN acetaminophen, minimize opioids as able  - augmentin started 7/24 due to drainage  - Continue Voltaren gel PRN      Suspected meningioma left parietal region, incidentally seen on CT  Head CT 7/13 also showed a calcified extra-axial mass overlying the left parietal region measuring 1.4 cm, potentially representing a meningioma.  - Follow-up serial monitoring outpatient     Anemia, suspect dilutional component  Hgb normal on admit.  Hgb 11.5 on 7/16. No overt clinical signs of major bleeding.  - Monitor intermittently      Intertriginous dermatitis  - Continue clotrimazole powder, started on 7/117      Constipation  On 7/18, noted that no BM since admit. Started bowel regimen.  - Continue scheduled Miralax and Senokot-S     Hypokalemia, hypophosphatemia  Potassium and phosphorus low at times this hospitalization. Treated with replacement.  - Continue PRN K and phos replacement     Morbid obesity  Body mass index is 59.11 kg/m .  - Needs to pursue aggressive dietary and lifestyle modifications    Clinically Significant Risk Factors Present on Admission                        DVT Prophylaxis: Enoxaparin (Lovenox) SQ  Code Status: Full Code     Expected Discharge Date: 07/26/2022    Discharge Delays: *Medically Ready for  Discharge  Placement - LTC  Financial counseling needed    Discharge Comments: LTC?       Melissa Howe, DO  Hospitalist Service  [facility]  Securely message with the [employer] Web Console (learn more here)  Text Page (7am - 6pm) via [employer] Paging/Directory      Interval History   Patient seen and examined. She is a little tired this afternoon, but calm. Able to answer a few questions.  Pain controlled. Discussed with RN, cleaned up orders. Plan to decrease vitals/neuro checks to avoid overnight awakening.    -Data reviewed today: I reviewed all new labs and imaging results over the last 24 hours. I personally reviewed no images or EKG's today.    Physical Exam   Temp: 97.9  F (36.6  C) Temp src: Axillary BP: 118/49 Pulse: 63   Resp: 18 SpO2: 95 % O2 Device: None (Room air)    Vitals:    07/15/22 0522 07/16/22 0500 07/23/22 1102   Weight: (!) 170.2 kg (375 lb 3.6 oz) (!) 171.2 kg (377 lb 6.8 oz) (!) 174.6 kg (385 lb)     Vital Signs with Ranges  Temp:  [97.1  F (36.2  C)-99  F (37.2  C)] 97.9  F (36.6  C)  Pulse:  [62-72] 63  Resp:  [18-20] 18  BP: (104-118)/(46-57) 118/49  SpO2:  [93 %-95 %] 95 %  I/O last 3 completed shifts:  In: 1350 [P.O.:1350]  Out: 450 [Urine:450]    Constitutional: Drowsy, cooperative, no apparent distress  Respiratory: Clear to auscultation bilaterally, no crackles or wheezing  Cardiovascular: Regular rate and rhythm, normal S1 and S2, and no murmur noted  GI: Normal bowel sounds, soft, non-distended, non-tender  Skin/Integumen: No rashes, no cyanosis, chronic woody edema, bilateral lower extremities with some dry skin  Other:     Medications     - MEDICATION INSTRUCTIONS -         acetaminophen  975 mg Oral Q8H     amoxicillin-clavulanate  1 tablet Oral Q12H [facility] (08/20)     aspirin  325 mg Oral Daily     atorvastatin  40 mg Oral QPM     diclofenac  2 g Topical 4x Daily     enoxaparin ANTICOAGULANT  40 mg Subcutaneous Q12H     furosemide  40 mg Oral Daily      lisinopril  10 mg Oral Daily     miconazole   Topical BID     polyethylene glycol  17 g Oral Daily     QUEtiapine  25 mg Oral At Bedtime     senna-docusate  1-2 tablet Oral BID       Data   Recent Labs   Lab 07/24/22  0507 07/23/22  1958 07/23/22  1010 07/21/22  0906 07/20/22  1726 07/20/22  0823 07/19/22  1635 07/19/22  0904 07/19/22  0800 07/18/22  1513   WBC  --   --   --   --   --   --   --  7.2  --   --    HGB  --   --   --   --   --   --   --  12.8  --   --    MCV  --   --   --   --   --   --   --  90  --   --      --   --  262  --   --   --  271  --   --    NA  --   --   --   --   --  140  --  142  --   --    POTASSIUM 4.0 3.4 3.2*  --    < > 3.2*   < > 2.8*  --   --    CHLORIDE  --   --   --   --   --  103  --  104  --   --    CO2  --   --   --   --   --  34*  --  31  --   --    BUN  --   --   --   --   --  12  --  10  --   --    CR 0.58  --   --  0.56  --  0.59  --  0.58  --    < >   ANIONGAP  --   --   --   --   --  3  --  7  --   --    ASHIA  --   --   --   --   --  8.3*  --  8.6  --   --    GLC  --   --   --   --   --  91  --  105* 88  --     < > = values in this interval not displayed.       No results found for this or any previous visit (from the past 24 hour(s)).

## 2022-07-25 NOTE — PLAN OF CARE
Goal Outcome Evaluation:  Plan of Care Reviewed With: patient   Overall Patient Progress: no change    /63   Pulse 67   Temp 97.7  F (36.5  C) (Oral)   Resp 18   Wt (!) 174.6 kg (385 lb)   SpO2 93%   BMI 60.30 kg/m   .    Assessment: Vital signs stable. Neuro assessment difficult to fully complete due to patient's uncooperativeness. Assist x2-3 with ceiling lift.x1 medium formed bowel movement after suppository in bedside commode. Purewick in place. Wound cares completed on left lower leg, PT reapplied lymph wraps. Sat in chair for several hours at breakfast. Turns q2 in bed.      Pain management: PRN oxy and prn seroquel per MAR    Interventions this shift: pain management, worked with PT, sat in chair.    Goals for next shift: manage pain, monitor for bowel movement, reposition

## 2022-07-26 NOTE — PROGRESS NOTES
[facility]    Hospitalist Progress Note      Assessment & Plan   Cristy Bailey is a 77 year old female with a PMH significant for morbid obesity, hypertension (not on/needing meds), and hyperlipidemia (not on/needing meds), who presented to [facility] 7/13/2022 with complete left-sided paralysis, left-sided facial droop, and difficulty speaking. CTA head remarkable for right MCA occlusion. She was given tenecteplase and subsequently transferred to [facility] 7/13/2022 for thrombectomy.     Acute right MCA ischemic stroke with edema and right to left midline shift  S/P tenecteplase and subsequent R MCA mechanical thrombectomy 7/13/2022  Presented to [facility] 7/13 with complete paralysis, left-sided facial droop, and aphasia. Code stroke initiated. Head CT 7/13 showed signs of an evolving R MCA infarct. CTA head 7/13 showed a right carotid terminus occlusion, right middle cerebral artery M1  segment occlusion with poor opacification of the more distal right MCA branches/poor collateral flow. CTA neck 7/13 negative for acute occlusions. Pt given tenecteplase 7/13 at 13:11. Noted to be agitated and was subsequently intubated given need for procedure.   Subsequently transferred to [facility] 7/13/2022 where she underwent above procedure.   7/14: Extubated. Head CT 7/14 showed evolution of acute infarct involving the R MCA distribution with increased swelling, cortical effacement, and new mild right-to-left midline shift; questionable hypoattenuation involving the bilateral occipital lobes which could be artifactual. Echo 7/14 showed LVEF 50%, grade 1 diastolic dysfunction; RV not well visualized but appeared mild-moderately dilated with global systolic function probably mildly reduced. Started ASA and atorvastatin.   7/15: Head CT 7/15 showed evolving R MCA stroke with areas of edema, overall stable.  - Continue telemetry  - Continue  mg daily, atorvastatin 40 mg daily  -  Continue PT, OT  - BP goal < 140/90  - Will defer 30 day cardiac monitor to PCP or neurology  - Follow-up with [facility] in 6-8 weeks  - Management of dysphagia as below  - Social work following for placement, family now wants to take her home--at this time, it is an unsafe discharge recommendation given she is assist of 2-3 and lift.     Dysphagia due to CVA  Nutrition  Seen by SLP and noted with dysphagia. Required some intermittent fluid boluses.  - Advance diet as able per SLP.     Volume overload, diastolic CHF exacerbation  Essential hypertension  Not on/needing meds PTA.   * 7/14: Echo 7/14 showed LVEF 50%, grade 1 diastolic dysfunction; RV not well visualized but appeared mild-moderately dilated with global systolic function probably mildly reduced.   * 7/16: BP's soft in the 90's systolic. Given more IVF's.  * 7/17: BP's more elevated. Pt with significant pedal edema, worse than baseline. Given IV furosemide 60 mg with very good UO response.  * 7/18: Started on lisinopril. Continued on furosemide IV.  * 7/19: BP's improved and stable since  - Discontinued IV furosemide and started on furosemide 40 mg daily on 7/20, she might need more  - Continue lisinopril 10 mg daily  - PRN IV hydralazine and PRN IV labetalol available  - Monitor I/Os, daily wts     Bilateral lower extremity chronic swelling with chronic venous stasis dermatitis and chronic skin changes  Hx of LLE cellulitis  Was hospitalized in 11/2021 for sepsis 2/2 to pneumonia and LLE cellulitis.   7/15: BLE noted to be patty and edematous, no unilateral leg swelling appreciated; LLE had patchy areas of erythema, no fluctuance, no painful areas with palpation; legs warm to touch.  7/17: Lymphedema consult ordered  - Keep bilateral lower extremities elevated while in bed or sitting  - Continue lymphedema wraps  - CHF management as noted.      DLD  FLP 7/13: , HDL 47, LDL 91,   - Continue atorvastatin 40 mg daily     Lower back wound, suspect from  pressure type injury--worsening  Acute lower back pain, suspect related to lower back wound  OA s/p bilateral hip replacements and h/o back pain  Chronic neck pain   Pt with significant back pain in the ICU requiring PRN IV hydromorphone.  PRN hydromorphone decreased 7/15 due to somnolence.  * MRI L-spine in 2018 showed multilevel degenerative changes with mild central stenosis. Patient has chronic back pain, reports having it over the last 2 years.   7/16: Noted with 5-6 cm by 2-3 cm wound with some swelling/fluid/blistering in a fold of her lower back, did not appear infected; this seemed to be the source of her pain. Padded dressing placed and WOC RN ordered.  7/18: Pain improved  7/24: augmentin started for wound  7/25: evaluated by [facility], picture shows more redness, purulence expressed. Worsening with shear stress from lift.  - Continue local wound cares per WOC RN  - Continue regular repositioning  - Continue PRN acetaminophen, low dose oxycodone  - Continue Voltaren gel PRN   - switch to clindamycin 900mg IV q8h  - check labs now and then again in AM  - general surgery consulted for evaluation for possible need for debridement     Suspected meningioma left parietal region, incidentally seen on CT  Head CT 7/13 also showed a calcified extra-axial mass overlying the left parietal region measuring 1.4 cm, potentially representing a meningioma.  - Follow-up serial monitoring outpatient     Anemia, suspect dilutional component  Hgb normal on admit.  Hgb 11.5 on 7/16. No overt clinical signs of major bleeding.  - Monitor intermittently      Intertriginous dermatitis  - Continue clotrimazole powder, started on 7/117      Constipation  On 7/18, noted that no BM since admit. Started bowel regimen.  - Continue scheduled Miralax and Senokot-S     Hypokalemia, hypophosphatemia  Potassium and phosphorus low at times this hospitalization. Treated with replacement.  - Continue PRN K and phos replacement     Morbid  obesity  Body mass index is 59.11 kg/m .  - Needs to pursue aggressive dietary and lifestyle modifications    Clinically Significant Risk Factors Present on Admission                        DVT Prophylaxis: Enoxaparin (Lovenox) SQ  Code Status: Full Code     Expected Discharge Date: 07/27/2022    Discharge Delays: *Medically Ready for Discharge  Placement - LTC  Financial counseling needed    Discharge Comments: LTC?       Melissa Howe,   Hospitalist Service  [facility]  Securely message with the Vocera Web Console (learn more here)  Text Page (7am - 6pm) via eeden Paging/Directory      Interval History   Patient seen and examined. She remains fairly tired, able to wake up for some things. Attempted to look at back with RN earlier today, looked red with some granulation tissue at edge, but in the shadows, did not see the erythema below wound until evaluated wound care image. Given discomfort from wound, worsening, general surgery will be consulted for eval. Switching abx, rechecking labs as well.    -Data reviewed today: I reviewed all new labs and imaging results over the last 24 hours. I personally reviewed no images or EKG's today.    Physical Exam   Temp: 98  F (36.7  C) Temp src: Oral BP: (!) 120/39 Pulse: 66   Resp: 18 SpO2: 93 % O2 Device: None (Room air)    Vitals:    07/15/22 0522 07/16/22 0500 07/23/22 1102   Weight: (!) 170.2 kg (375 lb 3.6 oz) (!) 171.2 kg (377 lb 6.8 oz) (!) 174.6 kg (385 lb)     Vital Signs with Ranges  Temp:  [97.6  F (36.4  C)-98.3  F (36.8  C)] 98  F (36.7  C)  Pulse:  [65-85] 66  Resp:  [18] 18  BP: ()/(39-63) 120/39  SpO2:  [93 %-95 %] 93 %  I/O last 3 completed shifts:  In: 840 [P.O.:840]  Out: 950 [Urine:950]    Constitutional: Drowsy, cooperative, no apparent distress  Respiratory: Clear to auscultation bilaterally, no crackles or wheezing  Cardiovascular: Regular rate and rhythm, normal S1 and S2, and no murmur noted  GI: Normal bowel  sounds, soft, non-distended, non-tender  Skin/Integumen: No rashes, no cyanosis, chronic woody edema, bilateral lower extremities with some dry skin  Other: Right mid-back with wound, dark red inside with some granulation tissue at edge. Below this is some erythema.    Medications     - MEDICATION INSTRUCTIONS -         acetaminophen  975 mg Oral Q8H     aspirin  325 mg Oral Daily     atorvastatin  40 mg Oral QPM     clindamycin  900 mg Intravenous Q8H     diclofenac  2 g Topical 4x Daily     enoxaparin ANTICOAGULANT  40 mg Subcutaneous Q12H     furosemide  40 mg Oral Daily     lisinopril  10 mg Oral Daily     miconazole   Topical BID     polyethylene glycol  17 g Oral Daily     pramoxine   Topical TID     QUEtiapine  25 mg Oral At Bedtime     senna-docusate  1-2 tablet Oral BID       Data   Recent Labs   Lab 07/24/22  0507 07/23/22  1958 07/23/22  1010 07/21/22  0906 07/20/22  1726 07/20/22  0823     --   --  262  --   --    NA  --   --   --   --   --  140   POTASSIUM 4.0 3.4 3.2*  --    < > 3.2*   CHLORIDE  --   --   --   --   --  103   CO2  --   --   --   --   --  34*   BUN  --   --   --   --   --  12   CR 0.58  --   --  0.56  --  0.59   ANIONGAP  --   --   --   --   --  3   ASHIA  --   --   --   --   --  8.3*   GLC  --   --   --   --   --  91    < > = values in this interval not displayed.       No results found for this or any previous visit (from the past 24 hour(s)).

## 2022-07-26 NOTE — PROGRESS NOTES
Care Management Follow Up    Length of Stay (days): 13    Expected Discharge Date: 07/27/2022     Concerns to be Addressed:       Patient plan of care discussed at interdisciplinary rounds: Yes    Anticipated Discharge Disposition: Home vs LTC     Anticipated Discharge Services:    Anticipated Discharge DME:      Patient/family educated on Medicare website which has current facility and service quality ratings:    Education Provided on the Discharge Plan: yes   Patient/Family in Agreement with the Plan:  TBD    Referrals Placed by CM/SW:  Several referrals out for LTC placment.te  Private pay costs discussed:  Pt's S.O., Leo was working with financial counselor regarding application for medical assistance. Pt and Leo are not  but have assets together. Pt is 1100.00 over for Medical Assistance. She would apply and have the 1100.00 spend down monthly and medical assistance would  the rest of the cost.  states that at this time, Leo declines this option.    TONY called and reviewed this with Leo and he states that he cannot afford LTC. TONY reviewed the above scenario with Leo and he states he is just going to bring pt home. He wanted pt to get stronger and come home. He believes that if pt swings her legs over the edge of the bed, she will be able t stand and use the walker. TONY told Leo that staff is using 2 just to move pt in bed and 2-3 and a lift to get her up. TONY suggested that he come to meet with PT/OT and pt to view and participate in a therapy session with pt. Leo says that he understands the situation but it is upsetting and he needs time now to think it over.    Additional Information:    JOSE GUADALUPE Barakat  [facility]  Care Transitions  [phone]

## 2022-07-26 NOTE — CONSULTS
[facility]  WOC Nurse Inpatient Assessment     Consulted for: re-evaluate mid back wound    Areas Assessed:      Areas visualized during today's visit: Back, posterior waist crease    Wound location: Posterior skin fold at waist     7/18 mid back                      7/26 Right posterior deep crease    Wound due to: [facility] Aquired  Stage: Deep Tissue Pressure Injury   Pressure Injury and Moisture Associated Skin Damage (MASD), suspect intertriginous pressure appears to be deeper tissue damage within a crease, possible shear from lift.   Wound history/plan of care: Dr. Ridley noted injury 7/16 with photo in the chart. WOC assessed  area 7/18 and noted blanchable redness and intact tissue. WOC reconsulted 7/26 and noted deteriorating wound. Pt with BMI of 60 with large deep creases and folds that trap moisture. Entire area with diffuse erythema and very painful. Pt resting in bed laying more on her left side. WOC spoke to Dr. Howe and recommended further work up and suspect pt will need surgical debridement as it is oozing purulent drainage and appears infected.  Wound base: 95 % yellow non viable tissue with dusky area to left 3/4 of wound, few dermal buds noted to right 1/4 of wound base.      Palpation of the wound bed: firm with few boggy slightly fluctuant areas at lower half of wound      Drainage: moderate     Description of drainage: purulent, yellow and red     Measurements (length x width x depth, in cm): 1.5cm  x 14cm   x  0.1+ cm suspect injury deep into tissue      Tunneling: N/A     Undermining: N/A  Periwound skin: Edematous and diffuse erythema and warmth      Color: purple and red      Temperature: warm  Odor: none  Pain: moderate, aching and throbbing  Pain interventions prior to dressing change: slow and gentle cares   Treatment goal: Infection control/prevention and Soften necrotic tissue  STATUS: deteriorating  Supplies ordered: ordered Vashe and Iodosorb        Treatment Plan:     Right Lower back crease: Daily and PRN  1. Cleanse wound with Vashe #([id number]) soaked gauze. Soak gauze and allow to sit in wound bed for 10 minutes then remove.  2.  Paint with skin prep or No Sting Skin Prep (#[id number]) where ever you want your dressing to stick, allow to dry thoroughly. Do NOT skip this step! Skin prep will help your dressing to stick  3. Apply  dab of Iodosorb Gel (#[id number]) to piece of adaptic/oil emulsion guaze (#[id number]) then depress into wound bed  4.  Cover wound with absorbent dressing (Mepilex or ABD)  5. Time and date dressing change  -Reposition pt side to side ever when in bed, every 2 hours-get the pt way over on side to completely offload pressure. This will benefit skin and respiratory function   -Keep heels elevated and floating on pillows at all times. Try using at least 2 pillows under each calf.  -When up to the chair pt needs to fully offload every 2 hours and use a chair cushion if needed       Orders: Written    RECOMMEND PRIMARY TEAM ORDER: Surgical consult  Education provided: importance of repositioning, plan of care, Infection prevention  and Off-loading pressure  Discussed plan of care with: Patient, Nurse and Physician  WO nurse follow-up plan: 2-3x/wk as available  Notify [facility] if wound(s) deteriorate.  Nursing to notify the Provider(s) and re-consult the [facility] Nurse if new skin concern.    DATA:     Current support surface: Bariatric Low air loss mattress  Containment of urine/stool: Incontinence Protocol and Incontinent pad in bed  BMI: Body mass index is 60.3 kg/m .   Active diet order: Orders Placed This Encounter      Advance Diet as Tolerated      Combination Diet Soft and Bite Sized Diet (level 6); Thin Liquids (level 0)     Output: I/O last 3 completed shifts:  In: 840 [P.O.:840]  Out: 950 [Urine:950]     Labs: No lab results found in last 7 days.    Invalid input(s): GLUCOMBO  Pressure injury risk assessment:   Sensory Perception: 3-->slightly  limited  Moisture: 2-->very moist  Activity: 2-->chairfast  Mobility: 2-->very limited  Nutrition: 2-->probably inadequate  Friction and Shear: 1-->problem  Demetrio Score: 12    Telly Meyer RN [facility]OCN   Dept. Pager: [phone]  Dept. Office Number: [phone]

## 2022-07-26 NOTE — PROVIDER NOTIFICATION
Dr. Howe messaged: Patient's back wound has gotten worse, wondering about a new wound consult? Thanks.     Response: WOC consult ordered

## 2022-07-26 NOTE — PLAN OF CARE
Goal Outcome Evaluation:  Plan of Care Reviewed With: patient   Overall Patient Progress: no change    BP (!) 120/39 (BP Location: Right arm, Patient Position: Chair, Cuff Size: Adult Large)   Pulse 66   Temp 98  F (36.7  C) (Oral)   Resp 18   Wt (!) 174.6 kg (385 lb)   SpO2 93%   BMI 60.30 kg/m   .    Assessment: Patient is alert and oriented to self. VSS. Purewick in place, no bowel movement this shift. Right back wound dressing changed x3. WOC consult this afternoon.    Pain management: PRN oxycodone    Interventions this shift: Up to chair, reposition q2hr, pain management    Goals for next shift: WOC, turn and repo q2

## 2022-07-27 NOTE — PROGRESS NOTES
Focus:  Back, posterior waist crease  S:  Progress notes and orders reviewed. Surgery PA and Surgeon assessed wound this am. Please see Surgery progress note. Per surgery assessment no  debridement indicated at this time.  As pt has had wound care completed x 2 this am, WOC will reassess wound in am.     Dorota SCHRADER

## 2022-07-27 NOTE — PLAN OF CARE
Occupational Therapy Discharge Summary    Reason for therapy discharge:    No further expectations of functional progress.    Progress towards therapy goal(s). See goals on Care Plan in [facility] electronic health record for goal details.  Goals not met.  Barriers to achieving goals:   limited tolerance for therapy.    Therapy recommendation(s):    Continued therapy is recommended.  Rationale/Recommendations: pt currently with limited progress in OT as limited by pain and fear/anxiety. To avoid duplication of services will defer mobility to PT. Can reinstate OT pending pt progress with PT in terms of functional mobility. If pt is able to demo improvement, pt would benefit from additional OT in a TCU setting to maximize safety and Ind with ADLs and BR transfers. If pt does not show improvements, pt would require higher level assist from LTC for I/ADLs as well as mobility. .

## 2022-07-27 NOTE — PLAN OF CARE
Goal Outcome Evaluation:    Plan of Care Reviewed With: patient     Overall Patient Progress: no change    Outcome Evaluation: soft and bite sized, thin liquids: poor intake with poor appetite. encouraged protein intake but patient was falling asleep during visit. will try gelatein+    Donna Ho RDN

## 2022-07-27 NOTE — PROGRESS NOTES
[facility]    Hospitalist Progress Note      Assessment & Plan   Cristy Bailey is a 77 year old female with a PMH significant for morbid obesity, hypertension (not on/needing meds), and hyperlipidemia (not on/needing meds), who presented to [facility] 7/13/2022 with complete left-sided paralysis, left-sided facial droop, and difficulty speaking. CTA head remarkable for right MCA occlusion. She was given tenecteplase and subsequently transferred to [facility] 7/13/2022 for thrombectomy.     Acute right MCA ischemic stroke with edema and right to left midline shift  S/P tenecteplase and subsequent R MCA mechanical thrombectomy 7/13/2022  Presented to [facility] 7/13 with complete paralysis, left-sided facial droop, and aphasia. Code stroke initiated. Head CT 7/13 showed signs of an evolving R MCA infarct. CTA head 7/13 showed a right carotid terminus occlusion, right middle cerebral artery M1  segment occlusion with poor opacification of the more distal right MCA branches/poor collateral flow. CTA neck 7/13 negative for acute occlusions. Pt given tenecteplase 7/13 at 13:11. Noted to be agitated and was subsequently intubated given need for procedure.   Subsequently transferred to [facility] 7/13/2022 where she underwent above procedure.   7/14: Extubated. Head CT 7/14 showed evolution of acute infarct involving the R MCA distribution with increased swelling, cortical effacement, and new mild right-to-left midline shift; questionable hypoattenuation involving the bilateral occipital lobes which could be artifactual. Echo 7/14 showed LVEF 50%, grade 1 diastolic dysfunction; RV not well visualized but appeared mild-moderately dilated with global systolic function probably mildly reduced. Started ASA and atorvastatin.   7/15: Head CT 7/15 showed evolving R MCA stroke with areas of edema, overall stable.  - Continue telemetry  - Continue  mg daily, atorvastatin 40 mg daily  -  Continue PT, OT  - BP goal < 140/90  - 30 day cardiac monitor on discharge  - Follow-up with [facility] in 6-8 weeks  - Management of dysphagia as below  - Social work following for placement, family now wants to take her home--at this time, it is an unsafe discharge recommendation given she is assist of 2-3 and lift.     Dysphagia due to CVA  Nutrition  Seen by SLP and noted with dysphagia. Required some intermittent fluid boluses.  - Advance diet as able per SLP.     Volume overload, diastolic CHF exacerbation  Essential hypertension  Not on/needing meds PTA.   * 7/14: Echo 7/14 showed LVEF 50%, grade 1 diastolic dysfunction; RV not well visualized but appeared mild-moderately dilated with global systolic function probably mildly reduced.   * 7/16: BP's soft in the 90's systolic. Given more IVF's.  * 7/17: BP's more elevated. Pt with significant pedal edema, worse than baseline. Given IV furosemide 60 mg with very good UO response.  * 7/18: Started on lisinopril. Continued on furosemide IV.  * 7/19: BP's improved and stable since  - Discontinued IV furosemide and started on furosemide 40 mg daily on 7/20, switch to BID on 7/27  - Continue lisinopril 10 mg daily  - PRN IV hydralazine and PRN IV labetalol available  - Monitor I/Os, daily wts     Bilateral lower extremity chronic swelling with chronic venous stasis dermatitis and chronic skin changes  Hx of LLE cellulitis  Was hospitalized in 11/2021 for sepsis 2/2 to pneumonia and LLE cellulitis.   7/15: BLE noted to be patty and edematous, no unilateral leg swelling appreciated; LLE had patchy areas of erythema, no fluctuance, no painful areas with palpation; legs warm to touch.  7/17: Lymphedema consult ordered  - Keep bilateral lower extremities elevated while in bed or sitting  - Continue lymphedema wraps  - CHF management as noted.      DLD  FLP 7/13: , HDL 47, LDL 91,   - Continue atorvastatin 40 mg daily     Lower back wound, suspect from pressure type  injury--worsening  Acute lower back pain, suspect related to lower back wound  OA s/p bilateral hip replacements and h/o back pain  Chronic neck pain   Pt with significant back pain in the ICU requiring PRN IV hydromorphone.  PRN hydromorphone decreased 7/15 due to somnolence.  * MRI L-spine in 2018 showed multilevel degenerative changes with mild central stenosis. Patient has chronic back pain, reports having it over the last 2 years.   7/16: Noted with 5-6 cm by 2-3 cm wound with some swelling/fluid/blistering in a fold of her lower back, did not appear infected; this seemed to be the source of her pain. Padded dressing placed and WOC RN ordered.  7/18: Pain improved  7/24: augmentin started for wound  7/26: evaluated by [facility], picture shows more redness, purulence expressed. Worsening with shear stress from lift. Procal <0.05, WBC WNL.  - Continue local wound cares per WOC RN  - Continue regular repositioning  - Continue PRN acetaminophen, low dose oxycodone  - Continue Voltaren gel PRN   - 7/26 transitioned to clindamycin 900mg IV q8h--MRSA swab ordered, not yet completed  - general surgery appreciated, no surgical debridement indicated, plan debriding agents per WOC     Suspected meningioma left parietal region, incidentally seen on CT  Head CT 7/13 also showed a calcified extra-axial mass overlying the left parietal region measuring 1.4 cm, potentially representing a meningioma.  - Follow-up serial monitoring outpatient     Anemia, suspect dilutional component  Hgb normal on admit.  Hgb 11.5 on 7/16. No overt clinical signs of major bleeding.  - Monitor intermittently      Intertriginous dermatitis  - Continue clotrimazole powder, started on 7/117      Constipation  On 7/18, noted that no BM since admit. Started bowel regimen.  - Continue scheduled Miralax and Senokot-S     Hypokalemia, hypophosphatemia  Potassium and phosphorus low at times this hospitalization. Treated with replacement.  - Continue PRN K and  phos replacement     Morbid obesity  Body mass index is 59.11 kg/m .  - Needs to pursue aggressive dietary and lifestyle modifications    Moderate malnutrition  In Context of:  Acute illness or injury  Chronic illness or disease  - nutrition following    Clinically Significant Risk Factors Present on Admission                        DVT Prophylaxis: Enoxaparin (Lovenox) SQ  Code Status: Full Code     Expected Discharge Date: 08/03/2022    Discharge Delays: *Medically Ready for Discharge  Placement - LTC  Financial counseling needed    Discharge Comments: LTC? once safe dispo plan in place       Melissa Howe DO  Hospitalist Service  [facility]  Securely message with the Vocera Web Console (learn more here)  Text Page (7am - 6pm) via [facility] Paging/Directory      Interval History   Patient seen and examined. She was more awake today, able to tell me she has pain everywhere. Back wound pain is the worst. Clarified goals of care, she wants to be a full code. She does not recall completing a POLST that stated DNR/DNI in the past. Patient isn't sure if her swelling is worse--she thinks her edema might be at baseline. Discussed care plan with RN.    -Data reviewed today: I reviewed all new labs and imaging results over the last 24 hours. I personally reviewed no images or EKG's today.    Physical Exam   Temp: 97.7  F (36.5  C) Temp src: Oral BP: 122/69 Pulse: 78   Resp: 16 SpO2: 93 % O2 Device: None (Room air)    Vitals:    07/15/22 0522 07/16/22 0500 07/23/22 1102   Weight: (!) 170.2 kg (375 lb 3.6 oz) (!) 171.2 kg (377 lb 6.8 oz) (!) 174.6 kg (385 lb)     Vital Signs with Ranges  Temp:  [97.4  F (36.3  C)-97.7  F (36.5  C)] 97.7  F (36.5  C)  Pulse:  [78-80] 78  Resp:  [16-18] 16  BP: (115-122)/(46-69) 122/69  SpO2:  [93 %-94 %] 93 %  I/O last 3 completed shifts:  In: 200 [P.O.:200]  Out: 900 [Urine:900]    Constitutional: Awake, alert, cooperative, moderate distress with movements in  bed  Respiratory: Clear to auscultation bilaterally, no crackles or wheezing  Cardiovascular: Regular rate and rhythm, normal S1 and S2, and no murmur noted  GI: Normal bowel sounds, soft, non-distended, non-tender  Skin/Integumen: No rashes, no cyanosis, chronic woody edema, lymph wraps in place, bilateral lower extremities with some dry skin  Other: Right mid-back with wound, dark red inside with some granulation tissue at edge with erythema extending into right buttock    Medications     - MEDICATION INSTRUCTIONS -         acetaminophen  975 mg Oral Q8H     aspirin  325 mg Oral Daily     atorvastatin  40 mg Oral QPM     clindamycin  900 mg Intravenous Q8H     diclofenac  2 g Topical 4x Daily     enoxaparin ANTICOAGULANT  40 mg Subcutaneous Q12H     furosemide  40 mg Oral BID     lisinopril  10 mg Oral Daily     miconazole   Topical BID     polyethylene glycol  17 g Oral Daily     pramoxine   Topical TID     QUEtiapine  25 mg Oral At Bedtime     senna-docusate  1-2 tablet Oral BID       Data   Recent Labs   Lab 07/27/22  0833 07/26/22  1542 07/24/22  0507 07/23/22  1958 07/23/22  1010 07/21/22  0906   WBC 6.4 8.1  --   --   --   --    HGB 12.9 13.5  --   --   --   --    MCV 88 90  --   --   --   --     328 291  --   --  262     --   --   --   --   --    POTASSIUM 3.5  --  4.0 3.4   < >  --    CHLORIDE 102  --   --   --   --   --    CO2 29  --   --   --   --   --    BUN 18  --   --   --   --   --    CR 0.57  --  0.58  --   --  0.56   ANIONGAP 7  --   --   --   --   --    ASHIA 8.4*  --   --   --   --   --    GLC 90  --   --   --   --   --     < > = values in this interval not displayed.       No results found for this or any previous visit (from the past 24 hour(s)).

## 2022-07-27 NOTE — CONSULTS
[facility] General Surgery Consultation    Cristy Bailey MRN# [id number]   YOB: 1945 Age: 77 year old      Date of Admission:  7/13/2022  Date of Consult: 7/27/2022         Assessment and Plan:   Patient is a 77 year old female with complete left-sided paralysis following CVA, s/p thrombectomy 7/13. Now with mid back wound, pressure type injury.    PLAN:  - Wound with surrounding cellulitis and induration. Continue IV clindamycin for infection.   - Appreciate WO nurse recommendations. Would encourage debriding agents. No underlying abscess or necrosis requiring surgical debridement in OR.  - Dr. Cao to evaluate to determine if some light bedside debridement is needed. No other surgical recommendations at this time.         Requesting Physician:      Dr. Howe        Chief Complaint:   No chief complaint on file.         History of Present Illness:   Cristy Bailey is a 77 year old female who presented to [facility] on 7/13 with complete left-sided paralysis. She was found to have acute right MCA ischemic stroke with edema and right to left midline shift. She was transferred to our facility and underwent tenecteplase and subsequent R MCA mechanical thrombectomy 7/13. She was extubated on 7/14 and has been able speak since but has lasting dysphagia and confusion at times. Cristy notes history of low back pain and chronic neck pain. She is unsure how long she has had low back wound but it was first noted in chart on 7/16. At this time there was swelling/fluid/blistering in a fold of her lower back but it did not appear infected. For the back pain she's been receiving dilaudid and voltaren gel. WO nurse evaluated wound yesterday and found that the wound was weeping purulent drainage and surrounding erythema/induration had worsened so our surgical team was consulted.          Physical Exam:   Blood pressure 115/46, pulse 80, temperature 97.4  F (36.3  C), temperature source Oral,  resp. rate 18, weight (!) 174.6 kg (385 lb), SpO2 94 %, not currently breastfeeding.  385 lbs 0 oz  General: Vital signs reviewed, in no apparent distress  Eyes: Anicteric  HENT: Normocephalic, atraumatic, trachea midline   Respiratory: Breathing nonlabored  Cardiovascular: Regular rate and rhythm  GI: Abdomen soft, nondistended  Musculoskeletal: Bilateral legs wrapped  Neurologic: Grossly nonfocal exam  Psychiatric: Normal mood, answers questions appropriately, some confusion  Back: midback crease with fibrinous wound base, no purulent drainage today, surrounding erythema and induration, no underlying fluctuance concerning for abscess           Past Medical History:     Past Medical History:   Diagnosis Date     Arthritis     hip     HTN (hypertension) 1/2010      Leg weakness 3/24/2015     Lyme disease 1980'S AND 2004    lYME DZ LIKE SXS RESPONDED TO ABX            Past Surgical History:     Past Surgical History:   Procedure Laterality Date     ARTHROPLASTY HIP Right 7/30/2018    Procedure: ARTHROPLASTY HIP;  Right total hip arthroplasty;  Surgeon: Bry Garcia MD;  Location:  OR     ARTHROPLASTY HIP Left 2/12/2019    Procedure: Left total hip arthroplasty ([employer] 60 mm acetabulum with 2 screws; #15 standard neck Synergy stem; +0 neck 36 mm head) (extra difficult case because of her high BMI and deep subcutaneous fatty layer resulting in the operative time at least twice as long from typical operative time);  Surgeon: Chavo Rodriguez MD;  Location:  OR     BIOPSY OF UTERUS LINING  3/2010    negative.      COLONOSCOPY  2/21/10    benign findings. advised 10 yr f/u.      INCISION AND DRAINAGE HIP, COMBINED Right 8/29/2018    Procedure: COMBINED INCISION AND DRAINAGE HIP;  Incision and debridement, right hip ;  Surgeon: Bry Garcia MD;  Location:  OR     ORTHOPEDIC SURGERY Right 08/29/2018    Right hip I & D 8/26/18, Right JENAE, DOS 7/30/2018, Dr. Garcia. [facility]  Center     ZZC C-SEC ONLY,PREV C-SEC      c-sec x 3             Current Medications:           acetaminophen  975 mg Oral Q8H     aspirin  325 mg Oral Daily     atorvastatin  40 mg Oral QPM     clindamycin  900 mg Intravenous Q8H     diclofenac  2 g Topical 4x Daily     enoxaparin ANTICOAGULANT  40 mg Subcutaneous Q12H     furosemide  40 mg Oral Daily     lisinopril  10 mg Oral Daily     miconazole   Topical BID     polyethylene glycol  17 g Oral Daily     pramoxine   Topical TID     QUEtiapine  25 mg Oral At Bedtime     senna-docusate  1-2 tablet Oral BID       sore throat lozenge, bisacodyl, hydrALAZINE, hydrOXYzine **OR** hydrOXYzine, labetalol, lidocaine 4%, lidocaine (buffered or not buffered), LORazepam, - MEDICATION INSTRUCTIONS -, naloxone **OR** naloxone **OR** naloxone **OR** naloxone, ondansetron **OR** ondansetron, oxyCODONE, QUEtiapine         Home Medications:     Prior to Admission medications    Medication Sig Last Dose Taking? Auth Provider Long Term End Date   ACETAMINOPHEN PO Take by mouth every 8 hours as needed for pain Past Week at prn Yes Unknown, Entered By History     aspirin (ASA) 325 MG tablet Take 1 tablet (325 mg) by mouth daily  Yes Shaka Rose MD     atorvastatin (LIPITOR) 40 MG tablet Take 1 tablet (40 mg) by mouth every evening  Yes Shaka Rose MD Yes    furosemide (LASIX) 40 MG tablet Take 1 tablet (40 mg) by mouth daily  Yes Shaka Rose MD Yes    lisinopril (ZESTRIL) 10 MG tablet Take 1 tablet (10 mg) by mouth daily  Yes Shaka Rose MD Yes    miconazole (MICATIN) 2 % external powder Apply topically 2 times daily  Yes Shaka Rose MD     polyethylene glycol (MIRALAX) 17 GM/Dose powder Take 17 g by mouth daily ; hold for loose stools/diarrhea.  Yes Shaka Rose MD     senna-docusate (SENOKOT-S/PERICOLACE) 8.6-50 MG tablet Take 1-2 tablets by mouth 2 times daily ; hold for loose stools/diarrhea.  Yes Shaka Rose MD      albuterol (PROVENTIL) (2.5 MG/3ML) 0.083% neb solution Take 1 vial (2.5 mg) by nebulization every 6 hours as needed for shortness of breath / dyspnea or wheezing  Patient not taking: Reported on 2022 Not Taking at Unknown time  Sandra Skinner Ra, APRN CNP Yes             Allergies:     Allergies   Allergen Reactions     No Known Drug Allergies             Family History:     Family History   Problem Relation Age of Onset     Cerebrovascular Disease Father         -stroke at age 80     Family History Negative Mother          Diedn her 80's of unknown causes.  Pt otherwise unaware of her health hx.      Unknown/Adopted Mother      Diabetes Brother         (Christian)  of DM complications at age 50.     Alcohol/Drug Brother         Christian     C.A.D. Brother         Christian.      Heart Disease Brother         Christian--MI age 50.     Family History Negative Brother      Family History Negative Brother      Family History Negative Sister      Family History Negative Sister      Family History Negative Son      Family History Negative Daughter      Family History Negative Daughter            Social History:   Cristy Bailey  reports that she quit smoking about 38 years ago. She has never used smokeless tobacco. She reports that she does not drink alcohol and does not use drugs.          Review of Systems:   The 12 point Review of Systems is negative other than noted in the HPI.         Labs/Imaging   All new lab and imaging data was reviewed.   Recent Labs   Lab 22  1542 22  0507 22  0906   WBC 8.1  --   --    HGB 13.5  --   --    HCT 42.5  --   --    MCV 90  --   --     291 262     Recent Labs   Lab 22  0507 22  1958 22  1010 22  0807 22  0906 22  1726 22  0823   NA  --   --   --   --   --   --  140   POTASSIUM 4.0 3.4 3.2*  --   --    < > 3.2*   CHLORIDE  --   --   --   --   --   --  103   CO2  --   --   --   --   --   --  34*   ANIONGAP   --   --   --   --   --   --  3   GLC  --   --   --   --   --   --  91   BUN  --   --   --   --   --   --  12   CR 0.58  --   --   --  0.56  --  0.59   GFRESTIMATED >90  --   --   --  >90  --  >90   ASHIA  --   --   --   --   --   --  8.3*   MAG 2.0  --  1.9 1.9 1.7  --  1.8   PHOS 2.9  --  2.8  --   --   --  2.8    < > = values in this interval not displayed.       I have personally reviewed the imaging studies-   n/a      Lacie Wilson PA-C    80 minutes spent on date of the encounter doing patient visit, chart review, and documentation.

## 2022-07-27 NOTE — PLAN OF CARE
Goal Outcome Evaluation:                  Patient A&0 x 1-2. Up with lift. Agreed to shift weight but refused repositioning. +3 edema noted on bilateral feet and legs. Edema wraps on bilateral legs. Redness noted around wound on R lower back.  Lungs diminished. On RA.   strength is moderate bilaterally. Plantar and dorsi flexion is wear bilaterally,  Pure Wick in place. 5mg oxycodone given for c/o back pain. Discharge TBD

## 2022-07-27 NOTE — PLAN OF CARE
Shift Summary    Oriented to self only. Vital signs within parameters. Requiring frequent reorientation. Purewick in place. PRN Oxy, Seroquel, and Ativan given with patient resting for a good period of time overnight. Dressing changed this AM. Resting between cares.

## 2022-07-27 NOTE — PLAN OF CARE
Goal Outcome Evaluation:  Plan of Care Reviewed With: patient   Overall Patient Progress: no change    Pt here with R MCA stroke, s/p TNK and thrombectomy, 7/13. Pt A&O x2, confused to time and situation, knows she in hospital, but not name, very forgetful. VSS, on RA. LS diminished, clear. Pain managed with repositioning, scheduled Tylenol and PRN Oxycodone. Neuro's intact except for LUE slight weaker than RUE, BUE 4/5 strength, BLE weakness, 2/ 5 strength, and L facial droop. A2-3 for T&R, lift, encouragement and education continued on importance to allow to be T&R, lift up to chair this afternoon. +BS, scheduled senna and Miralax given, no BM this shift. Incont of urine, purewick in place, adequate output for day shift. Soft Bite Sized thin liquid diet, poor appetite today, tolerated pills whole with water. BLE +3 edema, lymphedema wraps in place, BLE foam dressings changed, skin care completed. Wound care done per order to R lower back wound between skin fold, slough, yellow, moist, serosanguineous drainage, new Abd dressing applied. Gen Surg assessed, for now, no debridement procedure, cont with wound care orders, see note.  Discharge with cardio event monitor. Pt scoring green on Aggression Stop Light Tool. Discharge pending. Discharge home vs LTC, pending.

## 2022-07-27 NOTE — PROGRESS NOTES
CLINICAL NUTRITION SERVICES - REASSESSMENT NOTE      Recommendations Ordered by Registered Dietitian (RD):   - discontinued vital cuisine per pt request  - ordered gelatein+ @ 2 pm   Future/Additional Recommendations:   - monitor PO intake and supplement tolerance  - weight trends   Malnutrition:   % Weight Loss:  None noted - difficult to assess with fluid-status  % Intake:  <75% for > 7 days (moderate malnutrition)  Subcutaneous Fat Loss:  Orbital region mild depletion - visual  Muscle Loss:  Temporal region mild depletion and Clavicle bone region mild depletion - visual  Fluid Retention: trace arm edema and Moderate generalized edema    Malnutrition Diagnosis: Moderate malnutrition  In Context of:  Acute illness or injury  Chronic illness or disease       EVALUATION OF PROGRESS TOWARD GOALS   Diet: soft and bited sized (level 6), thin liquids (level 0)  - vital cuisine daily  - not appropriate for room service    Intake/Tolerance:   - per discussion with pt, she is unsure of how she has been eating but had breakfast in the room during visit and had fallen asleep after only about 25% intake. Encouraged adequate PO intake with good sources of protein. Pt had unopened vital cuisines and when asked she does not like chocolate milk or the shakes --> will discontinue. She did report a decreased appetite   - per nursing flow sheets, 25-75% intakes documented, mainly 50%  - per health touch, pt receiving 3 meals + 1 supplement daily  --> 3-day average of 1988 kcal/day and 83 g protein/day meeting <75% of needs    Barriers: fatigue, anxiety/restlessness, agitation, decreased appetite    ASSESSED NUTRITION NEEDS:  Dosing Weight: 89.7 kg (adjusted)  Estimated Energy Needs: 4499-0576 kcals (15-20 Kcal/Kg)  Justification: maintenance r/t BMI  Estimated Protein Needs: 108-135 grams protein (1.2-1.5 g pro/Kg)  Justification: wound healing, obesity guidelines, preservation of lean body mass and increased needs r/t age  Estimated  Fluid Needs: (1 mL/Kcal)  Justification: maintenance and per provider pending fluid status      NEW FINDINGS:   General:   - upon discharge --> TCU or LTC recommended, but having difficulty with financials so family wants to discharge pt to home  - A/O to self only    Weight:  - overall weight gain of +4.6 since admit: possibly fluid-related (receiving lasix)    Medications:  - lasix tablet daily, miralax (pt often refuses), senna-docusate    GI:  - first BM since admit x1 on 7/25    Skin: WOC following, 7/26:   Wound location: Posterior skin fold at waist  Wound due to: Pressure Injury and Moisture Associated Skin Damage (MASD), suspect intertriginous pressure appears to be deeper tissue damage within a crease, possible shear from lift.   Wound history/plan of care: Dr. Ridley noted injury 7/16 with photo in the chart. WOC assessed  area 7/18 and noted blanchable redness and intact tissue. WOC reconsulted 7/26 and noted deteriorating wound. Pt with BMI of 60 with large deep creases and folds that trap moisture. Entire area with diffuse erythema and very painful. Pt resting in bed laying more on her left side. WOC spoke to Dr. Howe and recommended further work up and suspect pt will need surgical debridement as it is oozing purulent drainage and appears infected.      Previous Goals:   n/a  Evaluation: Unable to evaluate    Previous Nutrition Diagnosis:   Predicted inadequate nutrient intake (energy/protein) related to prolonged admission, altered mental status, reduced appetite.   Evaluation: Declining- updated      MALNUTRITION  % Weight Loss:  None noted - difficult to assess with fluid-status  % Intake:  <75% for > 7 days (moderate malnutrition)  Subcutaneous Fat Loss:  Orbital region mild depletion - visual  Muscle Loss:  Temporal region mild depletion and Clavicle bone region mild depletion - visual  Fluid Retention: trace arm edema and Moderate generalized edema    Malnutrition Diagnosis: Moderate  malnutrition  In Context of:  Acute illness or injury  Chronic illness or disease    CURRENT NUTRITION DIAGNOSIS  Inadequate oral intake related to related to prolonged admission, altered mental status, reduced appetite, increased protein needs as evidenced by <75% intakes x7 days, moderate malnutrition    INTERVENTIONS  Recommendations / Nutrition Prescription  - discontinued vital cuisine per pt request  - ordered gelatein+ @ 2 pm    Implementation  Medical Food Supplement: see above    Goals  Patient to consume % of nutritionally adequate meals TID with supplements      MONITORING AND EVALUATION:  Progress towards goals will be monitored and evaluated per protocol and Practice Guidelines      Donna Ho RDN

## 2022-07-28 NOTE — PROGRESS NOTES
[facility]    Hospitalist Progress Note      Assessment & Plan   Cristy Bailey is a 77 year old female with a PMH significant for morbid obesity, hypertension (not on/needing meds), and hyperlipidemia (not on/needing meds), who presented to [facility] 7/13/2022 with complete left-sided paralysis, left-sided facial droop, and difficulty speaking. CTA head remarkable for right MCA occlusion. She was given tenecteplase and subsequently transferred to [facility] 7/13/2022 for thrombectomy.     Acute right MCA ischemic stroke with edema and right to left midline shift  S/P tenecteplase and subsequent R MCA mechanical thrombectomy 7/13/2022  Presented to [facility] 7/13 with complete paralysis, left-sided facial droop, and aphasia. Code stroke initiated. Head CT 7/13 showed signs of an evolving R MCA infarct. CTA head 7/13 showed a right carotid terminus occlusion, right middle cerebral artery M1  segment occlusion with poor opacification of the more distal right MCA branches/poor collateral flow. CTA neck 7/13 negative for acute occlusions. Pt given tenecteplase 7/13 at 13:11. Noted to be agitated and was subsequently intubated given need for procedure.   Subsequently transferred to [facility] 7/13/2022 where she underwent above procedure.   7/14: Extubated. Head CT 7/14 showed evolution of acute infarct involving the R MCA distribution with increased swelling, cortical effacement, and new mild right-to-left midline shift; questionable hypoattenuation involving the bilateral occipital lobes which could be artifactual. Echo 7/14 showed LVEF 50%, grade 1 diastolic dysfunction; RV not well visualized but appeared mild-moderately dilated with global systolic function probably mildly reduced. Started ASA and atorvastatin.   7/15: Head CT 7/15 showed evolving R MCA stroke with areas of edema, overall stable.  - Continue telemetry  - Continue  mg daily, atorvastatin 40 mg daily  -  Continue PT, OT  - BP goal < 140/90  - 30 day cardiac monitor on discharge  - Follow-up with [facility] in 6-8 weeks  - Management of dysphagia as below  - Social work following for placement, family now wants to take her home--at this time, it is an unsafe discharge recommendation given she is assist of 2-3 and lift.     Dysphagia due to CVA  Nutrition  Seen by SLP and noted with dysphagia. Required some intermittent fluid boluses.  - Advance diet as able per SLP.     Volume overload, diastolic CHF exacerbation  Essential hypertension  Not on/needing meds PTA.   * 7/14: Echo 7/14 showed LVEF 50%, grade 1 diastolic dysfunction; RV not well visualized but appeared mild-moderately dilated with global systolic function probably mildly reduced.   * 7/16: BP's soft in the 90's systolic. Given more IVF's.  * 7/17: BP's more elevated. Pt with significant pedal edema, worse than baseline. Given IV furosemide 60 mg with very good UO response.  * 7/18: Started on lisinopril. Continued on furosemide IV.  * 7/19: BP's improved and stable since  - Discontinued IV furosemide and started on furosemide 40 mg PO daily on 7/20, switch to BID on 7/27  - Continue lisinopril 10 mg daily  - PRN IV hydralazine and PRN IV labetalol available  - Monitor I/Os, daily wts     Bilateral lower extremity chronic swelling with chronic venous stasis dermatitis and chronic skin changes  Hx of LLE cellulitis  Was hospitalized in 11/2021 for sepsis 2/2 to pneumonia and LLE cellulitis.   7/15: BLE noted to be patty and edematous, no unilateral leg swelling appreciated; LLE had patchy areas of erythema, no fluctuance, no painful areas with palpation; legs warm to touch.  7/17: Lymphedema consult ordered   - Keep bilateral lower extremities elevated while in bed or sitting  - Continue lymphedema wraps  - CHF management as noted.      DLD  FLP 7/13: , HDL 47, LDL 91,   - Continue atorvastatin 40 mg daily     Lower back wound, suspect from pressure  type injury--worsening  Acute lower back pain, suspect related to lower back wound  OA s/p bilateral hip replacements and h/o back pain  Chronic neck pain   Pt with significant back pain in the ICU requiring PRN IV hydromorphone.  PRN hydromorphone decreased 7/15 due to somnolence.  * MRI L-spine in 2018 showed multilevel degenerative changes with mild central stenosis. Patient has chronic back pain, reports having it over the last 2 years.   7/16: Noted with 5-6 cm by 2-3 cm wound with some swelling/fluid/blistering in a fold of her lower back, did not appear infected; this seemed to be the source of her pain. Padded dressing placed and WOC RN ordered.  7/18: Pain improved  7/24: augmentin started for wound  7/26: evaluated by WOC, picture shows more redness, purulence expressed. Worsening with shear stress from lift. Procal <0.05, WBC WNL.  * 7/28: 2+ gram negative bacilli in wound. US negative for abscess  - Continue local wound cares per WOC RN  - Continue regular repositioning  - Continue PRN acetaminophen, low dose oxycodone  - Continue Voltaren gel PRN   - 7/26 transitioned to clindamycin 900mg IV q8h with improvement in erythema   - follow up MRSA swab   -lost IV access, switched to 300mg q6h clindamycin + 500mg levaquin q24h until further cultures result   -start probiotics  - general surgery appreciated     Suspected meningioma left parietal region, incidentally seen on CT  Head CT 7/13 also showed a calcified extra-axial mass overlying the left parietal region measuring 1.4 cm, potentially representing a meningioma.  - Follow-up serial monitoring outpatient     Anemia, suspect dilutional component  Hgb normal on admit.  Hgb 11.5 on 7/16. No overt clinical signs of major bleeding.  - Monitor intermittently       Intertriginous dermatitis  - Continue clotrimazole powder     Constipation  On 7/18, noted that no BM since admit. Started bowel regimen.  - Continue scheduled Miralax and  Senokot-S     Hypokalemia, hypophosphatemia  Potassium and phosphorus low at times this hospitalization. Treated with replacement.  - Continue PRN K and phos replacement     Morbid obesity  Body mass index is 59.11 kg/m .  - Needs to pursue aggressive dietary and lifestyle modifications    Moderate malnutrition  In Context of:  Acute illness or injury  Chronic illness or disease  - nutrition following    Clinically Significant Risk Factors Present on Admission                        DVT Prophylaxis: Enoxaparin (Lovenox) SQ  Code Status: Full Code       Melissa Howe DO  Hospitalist Service  [facility]  Securely message with the Vocera Web Console (learn more here)  Text Page (7am - 6pm) via [employer] Paging/Directory      Interval History   Patient seen and examined. Feeling okay today. Denies pain currently. No fevers. Received message from [facility] regarding culture. Discussed with RN.    -Data reviewed today: I reviewed all new labs and imaging results over the last 24 hours. I personally reviewed no images or EKG's today.    Physical Exam   Temp: 98.3  F (36.8  C) Temp src: Oral BP: 135/75 Pulse: 75   Resp: 18 SpO2: 95 % O2 Device: None (Room air)    Vitals:    07/15/22 0522 07/16/22 0500 07/23/22 1102   Weight: (!) 170.2 kg (375 lb 3.6 oz) (!) 171.2 kg (377 lb 6.8 oz) (!) 174.6 kg (385 lb)     Vital Signs with Ranges  Temp:  [97.3  F (36.3  C)-98.3  F (36.8  C)] 98.3  F (36.8  C)  Pulse:  [75-76] 75  Resp:  [16-18] 18  BP: (122-135)/(64-75) 135/75  SpO2:  [95 %] 95 %  I/O last 3 completed shifts:  In: 600 [P.O.:600]  Out: 425 [Urine:425]    Constitutional: Awake, alert, cooperative, no acute distress  Respiratory: Clear to auscultation bilaterally, no crackles or wheezing  Cardiovascular: Regular rate and rhythm, normal S1 and S2, and no murmur noted  GI: Normal bowel sounds, soft, non-distended, non-tender  Skin/Integumen: No rashes, no cyanosis, chronic woody edema, lymph wraps in  place  Other: See [facility] note for today's back wound image    Medications     - MEDICATION INSTRUCTIONS -         acetaminophen  975 mg Oral Q8H     aspirin  325 mg Oral Daily     atorvastatin  40 mg Oral QPM     clindamycin  900 mg Intravenous Q8H     diclofenac  2 g Topical 4x Daily     enoxaparin ANTICOAGULANT  40 mg Subcutaneous Q12H     furosemide  40 mg Oral BID     lisinopril  10 mg Oral Daily     miconazole   Topical BID     polyethylene glycol  17 g Oral Daily     pramoxine   Topical TID     QUEtiapine  25 mg Oral At Bedtime     senna-docusate  1-2 tablet Oral BID       Data   Recent Labs   Lab 07/28/22  0825 07/27/22  0833 07/26/22  1542 07/24/22  0507   WBC  --  6.4 8.1  --    HGB  --  12.9 13.5  --    MCV  --  88 90  --    PLT  --  314 328 291    138  --   --    POTASSIUM 3.4 3.5  --  4.0   CHLORIDE 101 102  --   --    CO2 31 29  --   --    BUN 13 18  --   --    CR 0.55 0.57  --  0.58   ANIONGAP 6 7  --   --    ASHIA 8.1* 8.4*  --   --    * 90  --   --        No results found for this or any previous visit (from the past 24 hour(s)).

## 2022-07-28 NOTE — PLAN OF CARE
Pt is alert to self only, Ax2/3, wheelchair bound at home w/ stand and pivot w/ help from SO. Gets Oxy for back pain. Easily redirectable. VSS on RA. 4 mepis in place on LE. Wound on R lower back, dressings changed during day shift. Legs cleaned up on day shift. Takes pills whole one at a time. Incontinent of B&B, purewick in place. Needs cardio event monitor w/ discharge.

## 2022-07-28 NOTE — PROVIDER NOTIFICATION
MD Notification    Notified Person: MD    Notified Person Name: Shyam    Notification Date/Time: 07/28/22, 15:45    Notification Interaction:    Purpose of Notification: updated aerobic bacterial culture result: 2+ gram negative bacilli, 4+ WBC    Orders Received: No new order    Comments:

## 2022-07-28 NOTE — PLAN OF CARE
Goal Outcome Evaluation:  Plan of Care Reviewed With: patient   Overall Patient Progress: no change    Pt here with R MCA stroke, s/p TNK and thrombectomy, 7/13. Pt A&O x1-2, confused to time and situation, knows she in hospital, but not name, very forgetful. VSS, on RA. LS diminished, clear. Pain managed with repositioning, scheduled Tylenol and PRN Oxycodone and Atarax. Neuro's intact except for LUE slight weaker than RUE, BUE 4/5 strength, BLE weakness, 2/ 5 strength, and L facial droop. A2-3 for T&R, lift, patient refuses at times, education given on importance. +BS, scheduled senna and Miralax given, no BM this shift. Incont of urine, purewick in place, adequate output for day shift. Soft Bite Sized thin liquid diet, poor appetite today, tolerated pills whole with water. BLE +3 edema, lymphedema wraps in place. Wound care completed with WOC RNs on R lower back wound between skin fold, sloughing, yellow, moist, serosanguineous drainage, culture collected and sent, new Abd dressing applied. Nasal swab sent, results pending on MRSA/MSSA. US Abdomen ordered. Discharge with cardio event monitor. Pt scoring green on [employer] Stop [employer] Tool. Discharge home vs LTC, pending.

## 2022-07-28 NOTE — PROGRESS NOTES
[facility]    General Surgery  Daily Progress Note       Assessment and Plan:   Cristy Bailey is a 77 year old female with complete left-sided paralysis following CVA, s/p thrombectomy 7/13. Now with mid back wound, pressure type injury.    PLAN:  - Wound with surrounding cellulitis and induration. Continue antibiotics, from surgical standpoint can transition from IV to oral.  - Appreciate [facility] nurse recommendations. Would recommend debriding agents for fibrinous wound base. No underlying abscess or necrosis requiring surgical debridement or I&D.  - No other surgical recommendations at this time. General surgery will sign off. Please call if questions or concerns.        Interval History:   Cristy Bailey is seen this morning on surgical rounds. She endorses back pain at wound. She's been taking dilaudid for this and using voltaren gel. [facility] nurse to see patient again today. Vitals wnl. Remains on IV clindamycin.         Physical Exam:   Temp: 98.3  F (36.8  C) Temp src: Oral BP: 135/75 Pulse: 75   Resp: 18 SpO2: 95 % O2 Device: None (Room air)      I/O last 3 completed shifts:  In: 600 [P.O.:600]  Out: 425 [Urine:425]    GENERAL: VS reviewed, alert, oriented, no acute distress  LUNGS: Normal respiratory effort, no wheezing  ABDOMEN:  Soft, nontender  BACK: midback crease with fibrinous wound base, no purulent drainage today, surrounding erythema and induration, no underlying fluctuance concerning for abscess  EXTREMITIES: Bilateral legs ACE wrapped  NEUROLOGICAL: Normal mood, answers questions appropriately, some confusion    Data   Recent Labs   Lab 07/27/22  0833 07/26/22  1542 07/24/22  0507 07/23/22  1958 07/23/22  1010 07/21/22  0906   WBC 6.4 8.1  --   --   --   --    HGB 12.9 13.5  --   --   --   --    MCV 88 90  --   --   --   --     328 291  --   --  262     --   --   --   --   --    POTASSIUM 3.5  --  4.0 3.4   < >  --    CHLORIDE 102  --   --   --   --   --     CO2 29  --   --   --   --   --    BUN 18  --   --   --   --   --    CR 0.57  --  0.58  --   --  0.56   ANIONGAP 7  --   --   --   --   --    ASHIA 8.4*  --   --   --   --   --    GLC 90  --   --   --   --   --     < > = values in this interval not displayed.       Lacie Wilson PA-C    30 minutes spent on date of the encounter doing patient visit, chart review, and documentation.

## 2022-07-28 NOTE — PROVIDER NOTIFICATION
Writer paged Dr. Howe on Abscess culture results, showing   2+ Gram negative bacilli 4+ WBC.   Addendum:    IV meds changed to oral.  Levaquin added to cover for gram neg.  Patient ok to have no IV site.

## 2022-07-28 NOTE — PROGRESS NOTES
[facility]  WOC Nurse Inpatient Assessment     Consulted for: re-evaluate mid back wound    Areas Assessed:      Areas visualized during today's visit: Back, posterior waist crease    Wound location: Posterior skin fold at waist            7/26 Right posterior deep crease      7/28 Right posterior Deep waist crease    Last Photo: Additional photos in chart    Wound due to: Wound due to: Hospital Acquired Pressure injury  Stage: Unstageable 7/28, assessed at bed side with WOCs Gely   Pressure Injury and Moisture Associated Skin Damage (MASD), suspect intertriginous pressure appears to be deeper tissue damage within a crease, possible shear from lift.    Wound history/plan of care: Pt frequently refusing repositioning and turning due to back pain.    Dr. Ridley noted injury 7/16 with photo in the chart. WOC assessed  area 7/18 and noted blanchable redness and intact tissue. WOC reconsulted 7/26 and noted deteriorating wound. Pt with BMI of 60 with large deep creases and folds that trap moisture. Entire area with diffuse erythema and very painful. Pt resting in bed laying more on her left side. WOC text paged Dr. Howe and requested wound culture order 7/28 and nursing sent to lab.     Wound base: 95 % yellow/tan non viable tissue tissue that has duskly discoloration on left 3/4 - appears slightly less dusky7/28, few dermal buds noted to right 1/4 of wound base. Linear moisture split at 9 O'clock 0.3cm x 3cm with pink dermis.     Palpation of the wound bed: firm with few  slightly fluctuant areas at lower half of wound, when lower half palpated it oozes purulent drainage.     Drainage: moderate     Description of drainage: purulent, yellow and red     Measurements (length x width x depth, in cm): 2.2cm  x 15cm   x  0.1+ cm suspect injury deep into tissue (measurments dependent on positioning due to body habitus)     Tunneling: N/A     Undermining: N/A  Periwound skin: Edematous,  "Indurated and diffuse erythema and warmth. Erythema is slighlty less 7/28      Color: purple and red      Temperature: warm  Odor: none  Pain: moderate, aching and throbbing  Pain interventions prior to dressing change: slow and gentle cares   Treatment goal: Infection control/prevention and Soften necrotic tissue  STATUS: deteriorating  Supplies ordered: at bedside       Wound Location: Buttock        Wound due to: Blanchable erythema, not currently a pressure injury  Wound history/plan of care: Pt with large body habitus and difficulty with repositioning due to back pain.   Wound base: 100 % blanchable      Palpation of the wound bed: normal      Drainage: none     Description of drainage: none     Measurements (length x width x depth, in cm): 1.5  x 5  x  0 cm      Tunneling: N/A     Undermining: N/A  Periwound skin: Intact and hyperpigmentation, venous congestion and chronic skin changes      Color: pink, purple and brown      Temperature: normal   Odor: none  Pain: denies , none  Pain interventions prior to dressing change: N/A  Treatment goal: Protection  STATUS: initial assessment  Supplies ordered: none necessary     Buttock intact at this time and erythema most likely due to linens underneath patient. I feel adding a dressing would add additional material at this time and wouldn't be appropriate. Continue to encourage PIP measures      Pressure Injury Prevention (PIP) Plan:  If patient is declining pressure injury prevention interventions: Explore reason why and address patient's concerns, Educate on pressure injury risk and prevention intervention(s), If patient is still declining, document \"informed refusal\"  and Ensure Care team is aware ( provider, charge nurse, etc)  Mattress: Follow bed algorithm, reassess daily and order specialty mattress, if indicated.  HOB: Maintain at or below 30 degrees, unless contraindicated  Repositioning in bed: Every 1-2 hours , Left/right positioning; avoid supine and " Raise foot of bed prior to raising head of bed, to reduce patient sliding down (shear)  Heels: Keep elevated off mattress and Pillows under calves  Protective Dressing: None  Positioning Equipment: None  Chair positioning: Chair cushion (#[id number])  and Assist patient to reposition hourly   If patient has a buttock pressure injury, or high risk for PI use chair cushion or SPS.  Moisture Management: Perineal cleansing /protection: Follow Incontinence Protocol, Avoid brief in bed and Clean and dry skin folds with bathing   Under Devices: Inspect skin under all medical devices during skin inspection  and Ensure tubes are stabilized without tension  Ask provider to discontinue device when no longer needed.        Treatment Plan:     Right Lower back crease: Daily and PRN  1. Cleanse wound with Vashe #(839805) soaked gauze. Soak gauze and allow to sit in wound bed for 10 minutes then remove.  2.  Paint with skin prep or No Sting Skin Prep (#424570) where ever you want your dressing to stick, allow to dry thoroughly. Do NOT skip this step! Skin prep will help your dressing to stick  3. Apply  dab of Iodosorb Gel (#743652) to piece of adaptic/oil emulsion guaze (#316353) then depress into wound bed  4.  Cover wound with absorbent dressing (Mepilex or ABD)  5. Time and date dressing change  -Reposition pt side to side ever when in bed, every 2 hours-get the pt way over on side to completely offload pressure. This will benefit skin and respiratory function   -Keep heels elevated and floating on pillows at all times. Try using at least 2 pillows under each calf.  -When up to the chair pt needs to fully offload every 2 hours and use a chair cushion if needed     Pannus: BID and PRN  1.Cleanse the area with Azra cleanse and protect and marga dry wipes/soft cloth.   2. Apply ostomy powder (#5071) on all reddened or denuded skin.   3. Apply nickel thick layer of Triad paste (#623715) to wound bed and thin layer over reddened areas    4. Ok to use Interdry AG to all other intact tissue. Ensure at least 2 inch tail is left outside of body to wick moisture. Single layer pillow case can also be used to help keep pannus dry if interdry is not staying well.     **If complete removal of paste is necessary use baby oil/mineral oil (Located in Pharmacy) and soft wash cloth.       Orders: Written and Updated    RECOMMEND PRIMARY TEAM ORDER: Surgical consult, recommend surgery reassess due to purulence and pain (picture in chart of drainage)  Education provided: importance of repositioning, plan of care, Infection prevention  and Off-loading pressure  Discussed plan of care with: Patient, Nurse and Physician  WOC nurse follow-up plan: 2-3x/wk as available  Notify WOC if wound(s) deteriorate.  Nursing to notify the Provider(s) and re-consult the WOC Nurse if new skin concern.    DATA:     Current support surface: Bariatric Low air loss mattress  Containment of urine/stool: Incontinence Protocol and Incontinent pad in bed  BMI: Body mass index is 60.3 kg/m .   Active diet order: Orders Placed This Encounter      Advance Diet as Tolerated      Combination Diet Soft and Bite Sized Diet (level 6); Thin Liquids (level 0)     Output: I/O last 3 completed shifts:  In: 600 [P.O.:600]  Out: 425 [Urine:425]     Labs:   Recent Labs   Lab 07/27/22  0833   HGB 12.9   WBC 6.4     Pressure injury risk assessment:   Sensory Perception: 4-->no impairment  Moisture: 3-->occasionally moist  Activity: 2-->chairfast  Mobility: 2-->very limited  Nutrition: 2-->probably inadequate  Friction and Shear: 1-->problem  Demetrio Score: 14    Telly Meyer RN CWOCN   Dept. Pager: [phone]  Dept. Office Number: [phone]

## 2022-07-29 NOTE — PROGRESS NOTES
General surgery note    Right flank intertriginous wound has become larger and in need of investigation with irrigation and debridement.  Given the degree of pain in the area we will attempt to do this on the intravenous sedation in the operating room.  We will make her n.p.o. after midnight and try to proceed tomorrow.  She understands the risks versus the benefits and would like to proceed.

## 2022-07-29 NOTE — PLAN OF CARE
Goal Outcome Evaluation:      Patient Information  Name: Cristy Bailey  Age: 77 year old    DATE & TIME: 07/28/22, [id number]  Cognitive Concerns/ Orientation : Disoriented to time and situation  BEHAVIOR & AGGRESSION TOOL COLOR: anxious at time  ABNL VS/O2: VSS, room air  MOBILITY: up to BSC with lift assist, turn and repo   PAIN MANAGMENT: back pain , ICE, tylenol and oxy used  DIET: soft and bite  BOWEL/BLADDER: occasional incontinent, Periwick. BM this shift   SKIN: lower back wound,  see flow sheet for other skin issues  OTHER IMPORTANT INFO: pt is here with Right MCA stroke, S/P Tenecteplase thrombectomy 7/13. Mostly neuro assessments has been stable. Refer to flow sheet. Continue to monitor.

## 2022-07-29 NOTE — PLAN OF CARE
Goal Outcome Evaluation:    Pt A&Ox2. VSS on 2L NC overnight. Up A2-3 lift. Diet soft bite sized with thin liquids. IVSL. PRN oxycodone & sched tyl for pain, PRN ativan for anxiety. Incontinent of B&B, got up to BSC for BM, purewick in place. Consults: WOC for back wound & lymph wraps. Discharge pending placement.

## 2022-07-29 NOTE — CARE PLAN
[id number]: Alert and oriented x1. Neuros intact except general weakness. Incontinent of bladder, purewick in place. Up with lift. Bowel movement on commode today. Dressings not done yet today. Plan to continue to monitor tonight.

## 2022-07-29 NOTE — PROGRESS NOTES
Care Management Follow Up    Length of Stay (days): 16    Expected Discharge Date:       Concerns to be Addressed:       Patient plan of care discussed at interdisciplinary rounds: Yes    Anticipated Discharge Disposition: LTC   Anticipated Discharge Services:    Anticipated Discharge DME:      Education Provided on the Discharge Plan:  TONY called and reviewed again with pt's S.O. Leo, the need for LTC placement for pt. He states that he was visiting  Pt yesterday and it is clear to him that he is not able to care for her at home. Pt has continued to require significant care and shown no improvement to qualify for a TCU placment.  SW explained why pt does not meet rehab requirements. He is agreeable to her need for care in a SNF.  SW will re refer FC to contact Leo to finalize MA application. Leo states he doesn't know what to do and after discussion, he is willing to speak with FC again to make financial arrangements to pt's LTC. Leo acknowleges that while pt needs care, she doesn't need hospital level care.   Patient/Family in Agreement with the Plan:  Yes. Notified FC to contact Leo again    Referrals Placed by CM/TONY:  [facility]. LTC referral out. May need to send additional referrals   Private pay costs discussed: yes  Additional Information:  S.O., Leo, seems at a loss of what to do for pt and has agreed that she needs care that he cannot provide for pt at home. TONY reassured him that if she were to become better, he can make arrangements to take her home.  Leo agreed to pursue application for MA and speak with FC to walk him though the process.    [facility]  Care Transitions  [phone]

## 2022-07-29 NOTE — PROGRESS NOTES
[facility]    Hospitalist Progress Note      Assessment & Plan   Cristy Bailey is a 77 year old female with a PMH significant for morbid obesity, hypertension (not on/needing meds), and hyperlipidemia (not on/needing meds), who presented to [facility] ([place], [place]) 7/13/2022 with complete left-sided paralysis, left-sided facial droop, and difficulty speaking. CTA head remarkable for right MCA occlusion. She was given tenecteplase and subsequently transferred to [facility] 7/13/2022 for thrombectomy.     Acute right MCA ischemic stroke with edema and right to left midline shift  S/P tenecteplase and subsequent R MCA mechanical thrombectomy 7/13/2022  Presented to [facility] ([place], [place]) 7/13 with complete paralysis, left-sided facial droop, and aphasia. Code stroke initiated. Head CT 7/13 showed signs of an evolving R MCA infarct. CTA head 7/13 showed a right carotid terminus occlusion, right middle cerebral artery M1  segment occlusion with poor opacification of the more distal right MCA branches/poor collateral flow. CTA neck 7/13 negative for acute occlusions. Pt given tenecteplase 7/13 at 13:11. Noted to be agitated and was subsequently intubated given need for procedure.   Subsequently transferred to [facility] 7/13/2022 where she underwent above procedure.   7/14: Extubated. Head CT 7/14 showed evolution of acute infarct involving the R MCA distribution with increased swelling, cortical effacement, and new mild right-to-left midline shift; questionable hypoattenuation involving the bilateral occipital lobes which could be artifactual. Echo 7/14 showed LVEF 50%, grade 1 diastolic dysfunction; RV not well visualized but appeared mild-moderately dilated with global systolic function probably mildly reduced. Started ASA and atorvastatin.   7/15: Head CT 7/15 showed evolving R MCA stroke with areas of edema, overall stable.  - Continue telemetry  - Continue  mg daily, atorvastatin 40 mg daily  -  Continue PT, OT  - BP goal < 140/90  - 30 day cardiac monitor on discharge  - Follow-up with [facility] in 6-8 weeks  - Management of dysphagia as below  - Social work following for placement, family now wants to take her home--at this time, it is an unsafe discharge recommendation given she is assist of 2-3 and lift.     Dysphagia due to CVA  Nutrition  Seen by SLP and noted with dysphagia. Required some intermittent fluid boluses.  - Advance diet as able per SLP.     Volume overload, diastolic CHF exacerbation  Essential hypertension  Not on/needing meds PTA.   * 7/14: Echo 7/14 showed LVEF 50%, grade 1 diastolic dysfunction; RV not well visualized but appeared mild-moderately dilated with global systolic function probably mildly reduced.   * 7/16: BP's soft in the 90's systolic. Given more IVF's.  * 7/17: BP's more elevated. Pt with significant pedal edema, worse than baseline. Given IV furosemide 60 mg with very good UO response.  * 7/18: Started on lisinopril. Continued on furosemide IV.  * 7/19: BP's improved and stable since  - Discontinued IV furosemide and started on furosemide 40 mg PO daily with 20mg PO in afternoon  - Continue lisinopril 10 mg daily  - PRN IV hydralazine and PRN IV labetalol available  - Monitor I/Os, daily wts     Bilateral lower extremity chronic swelling with chronic venous stasis dermatitis and chronic skin changes  Hx of LLE cellulitis  Was hospitalized in 11/2021 for sepsis 2/2 to pneumonia and LLE cellulitis.   7/15: BLE noted to be patty and edematous, no unilateral leg swelling appreciated; LLE had patchy areas of erythema, no fluctuance, no painful areas with palpation; legs warm to touch.  7/17: Lymphedema consult ordered   - Keep bilateral lower extremities elevated while in bed or sitting  - Continue lymphedema wraps  - CHF management as noted.      DLD  FLP 7/13: , HDL 47, LDL 91,   - Continue atorvastatin 40 mg daily     Lower back wound, suspect from pressure type  injury--worsening  Acute lower back pain, suspect related to lower back wound  OA s/p bilateral hip replacements and h/o back pain  Chronic neck pain   Pt with significant back pain in the ICU requiring PRN IV hydromorphone.  PRN hydromorphone decreased 7/15 due to somnolence.  * MRI L-spine in 2018 showed multilevel degenerative changes with mild central stenosis. Patient has chronic back pain, reports having it over the last 2 years.   7/16: Noted with 5-6 cm by 2-3 cm wound with some swelling/fluid/blistering in a fold of her lower back, did not appear infected; this seemed to be the source of her pain. Padded dressing placed and WOC RN ordered.  7/18: Pain improved  7/24: augmentin started for wound  7/26: evaluated by [facility], picture shows more redness, purulence expressed. Worsening with shear stress from lift. Procal <0.05, WBC WNL.  * 7/28: Proteus in wound. US negative for abscess. MRSA negative  - Continue local wound cares per WOC RN  - Continue regular repositioning  - Continue PRN acetaminophen, low dose oxycodone  - Continue Voltaren gel PRN   - 7/26 transitioned to clindamycin 900mg IV q8h with improvement in erythema   -lost IV access, switched to 300mg q6h clindamycin + 500mg levaquin q24h --> consider transition to cefdinir pending culture sensitivities   - probiotics  - general surgery appreciated     Suspected meningioma left parietal region, incidentally seen on CT  Head CT 7/13 also showed a calcified extra-axial mass overlying the left parietal region measuring 1.4 cm, potentially representing a meningioma.  - Follow-up serial monitoring outpatient     Anemia, suspect dilutional component  Hgb normal on admit.  Hgb 11.5 on 7/16. No overt clinical signs of major bleeding.  - Monitor intermittently       Intertriginous dermatitis  - Continue clotrimazole powder     Constipation  On 7/18, noted that no BM since admit. Started bowel regimen.  - Continue scheduled Miralax and  Senokot-S     Hypokalemia, hypophosphatemia  Potassium and phosphorus low at times this hospitalization. Treated with replacement.  - Continue PRN K and phos replacement     Morbid obesity  Body mass index is 59.11 kg/m .  - Needs to pursue aggressive dietary and lifestyle modifications    Moderate malnutrition  In Context of:  Acute illness or injury  Chronic illness or disease  - nutrition following    Clinically Significant Risk Factors Present on Admission                        DVT Prophylaxis: Enoxaparin (Lovenox) SQ  Code Status: Full Code       Melissa Howe DO  Hospitalist Service  [facility]  Securely message with the Vocera Web Console (learn more here)  Text Page (7am - 6pm) via [employer] Paging/Directory      Interval History   Patient seen and examined. Feeling okay today. Pain getting better. Still has itching skin. Legs still feel swollen. Decreasing afternoon lasix from 40 to 20, will monitor Cr in AM. Discontinue afternoon dose if Cr starting to get worse. Discussed with RN    -Data reviewed today: I reviewed all new labs and imaging results over the last 24 hours. I personally reviewed no images or EKG's today.    Physical Exam   Temp: 98.3  F (36.8  C) Temp src: Oral BP: 111/65 Pulse: 77   Resp: 18 SpO2: 92 % O2 Device: None (Room air)    Vitals:    07/16/22 0500 07/23/22 1102 07/28/22 1508   Weight: (!) 171.2 kg (377 lb 6.8 oz) (!) 174.6 kg (385 lb) (!) 159.2 kg (350 lb 14.4 oz)     Vital Signs with Ranges  Temp:  [98.3  F (36.8  C)-98.4  F (36.9  C)] 98.3  F (36.8  C)  Pulse:  [71-77] 77  Resp:  [16-18] 18  BP: (111)/(65-71) 111/65  SpO2:  [92 %-94 %] 92 %  I/O last 3 completed shifts:  In: 440 [P.O.:440]  Out: 1200 [Urine:1200]    Constitutional: Awake, alert, cooperative, no acute distress  Respiratory: Clear to auscultation bilaterally, no crackles or wheezing  Cardiovascular: Regular rate and rhythm, normal S1 and S2, and no murmur noted  GI: Normal bowel sounds,  soft, non-distended, non-tender  Skin/Integumen: No rashes, no cyanosis, chronic woody edema, lymph wraps in place  Other: Back with surrounding erythema improving, dressing in place over wound while up in chair    Medications     - MEDICATION INSTRUCTIONS -         acetaminophen  975 mg Oral Q8H     aspirin  325 mg Oral Daily     atorvastatin  40 mg Oral QPM     clindamycin  300 mg Oral Q6H DARWIN     diclofenac  2 g Topical 4x Daily     enoxaparin ANTICOAGULANT  40 mg Subcutaneous Q12H     furosemide  40 mg Oral BID     lactobacillus rhamnosus (GG)  1 capsule Oral BID     levofloxacin  750 mg Oral Daily     lisinopril  10 mg Oral Daily     miconazole   Topical BID     polyethylene glycol  17 g Oral Daily     pramoxine   Topical TID     QUEtiapine  25 mg Oral At Bedtime     senna-docusate  1-2 tablet Oral BID       Data   Recent Labs   Lab 07/29/22  0717 07/28/22  0825 07/27/22  0833 07/26/22  1542   WBC 6.0  --  6.4 8.1   HGB 13.2  --  12.9 13.5   MCV 88  --  88 90     --  314 328    138 138  --    POTASSIUM 3.1* 3.4 3.5  --    CHLORIDE 101 101 102  --    CO2 32 31 29  --    BUN 11 13 18  --    CR 0.64 0.55 0.57  --    ANIONGAP 5 6 7  --    ASHIA 8.6 8.1* 8.4*  --    * 100* 90  --        Recent Results (from the past 24 hour(s))   US Abdomen Limited    Narrative    US ABDOMEN LIMITED 7/28/2022 3:13 PM    CLINICAL HISTORY: Evaluate for abscess, right mid/lower back skin fold  (area of wound).    TECHNIQUE: Limited abdominal ultrasound.    COMPARISON: None.    FINDINGS:    No subcutaneous abscess demonstrated in the region of the wound.      Impression    IMPRESSION:  No abscess demonstrated.    NAHOMY SILVERMAN MD         SYSTEM ID:  [id number]

## 2022-07-29 NOTE — PROGRESS NOTES
Pt here with R MCA stroke, s/p TNK and thrombectomy. Pt A&O x1-2, confused to time and situation, very forgetful. VSS, on RA. LS diminished, clear. Pain managed with repositioning, scheduled Tylenol . Neuro's intact except for BUE BLE weakness, 2/ 5 strength, and L facial droop. A2-3 for T&R, lift. BMx2 this shift. Incont of urine, purewick in place. Soft Bite Sized thin liquid diet, poor appetite today, tolerated pills whole with water. BLE +3 edema, lymphedema wraps in place. Wound care done  on R lower back wound between skin fold. Pt scoring green on Aggression Stop Light Tool. Plan: MA pending, NPO at [facility] for I/D, pt can have clear liquids  overnight.

## 2022-07-30 NOTE — PROGRESS NOTES
Surgery Note    Pt now s/p excisional debridement of right flank abscess. Cultures pending.   - ok to advance diet  - will need daily dressing changes (orders entered)  - WOC RN consult - appreciate recommendations    Mariah Black MD  [facility]  [phone]

## 2022-07-30 NOTE — CARE PLAN
Alert to self only. VSS. I and D done today, dressing CDI. Voiding well. Up with lift. Dressings changed, lymph wraps done today. Neuros intact except confusion and weakness. Plan to continue to monitor tonight.

## 2022-07-30 NOTE — ANESTHESIA PREPROCEDURE EVALUATION
Anesthesia Pre-Procedure Evaluation    Patient: Cristy Bailey   MRN: [id number] : 1945        Procedure : Procedure(s):  IRRIGATION AND DEBRIDEMENT, PRESSURE ULCER on right flank          Past Medical History:   Diagnosis Date     Arthritis     hip     HTN (hypertension) 2010      Leg weakness 3/24/2015     Lyme disease  AND     lYME DZ LIKE SXS RESPONDED TO ABX      Past Surgical History:   Procedure Laterality Date     ARTHROPLASTY HIP Right 2018    Procedure: ARTHROPLASTY HIP;  Right total hip arthroplasty;  Surgeon: Bry Garcia MD;  Location: [facility] OR     ARTHROPLASTY HIP Left 2019    Procedure: Left total hip arthroplasty ([employer] 60 mm acetabulum with 2 screws; #15 standard neck Synergy stem; +0 neck 36 mm head) (extra difficult case because of her high BMI and deep subcutaneous fatty layer resulting in the operative time at least twice as long from typical operative time);  Surgeon: Chavo Rodriguez MD;  Location: [facility] OR     BIOPSY OF UTERUS LINING  3/2010    negative.      COLONOSCOPY  2/21/10    benign findings. advised 10 yr f/u.      INCISION AND DRAINAGE HIP, COMBINED Right 2018    Procedure: COMBINED INCISION AND DRAINAGE HIP;  Incision and debridement, right hip ;  Surgeon: Bry Garcia MD;  Location: [facility] OR     IR CAROTID CEREBRAL ANGIOGRAM BILATERAL  2022     ORTHOPEDIC SURGERY Right 2018    Right hip I & D 18, Right JENAE, DOS 2018, Dr. Garcia. [facility]     ZZC C-SEC ONLY,PREV C-SEC      c-sec x 3       Allergies   Allergen Reactions     No Known Drug Allergies       Social History     Tobacco Use     Smoking status: Former Smoker     Quit date: 1984     Years since quittin.0     Smokeless tobacco: Never Used   Substance Use Topics     Alcohol use: No      Wt Readings from Last 1 Encounters:   22 (!) 159.2 kg (350 lb 14.4 oz)        Allergies   Allergen Reactions     No Known Drug  Allergies      Prior to Admission medications    Medication Sig Start Date End Date Taking? Authorizing Provider   ACETAMINOPHEN PO Take by mouth every 8 hours as needed for pain   Yes Unknown, Entered By History   aspirin (ASA) 325 MG tablet Take 1 tablet (325 mg) by mouth daily 7/20/22  Yes Shaka Rose MD   atorvastatin (LIPITOR) 40 MG tablet Take 1 tablet (40 mg) by mouth every evening 7/19/22  Yes Shaka Rose MD   furosemide (LASIX) 40 MG tablet Take 1 tablet (40 mg) by mouth daily 7/20/22  Yes Shaka Rose MD   lisinopril (ZESTRIL) 10 MG tablet Take 1 tablet (10 mg) by mouth daily 7/20/22  Yes Shaka Rose MD   miconazole (MICATIN) 2 % external powder Apply topically 2 times daily 7/19/22  Yes Shaka Rose MD   polyethylene glycol (MIRALAX) 17 GM/Dose powder Take 17 g by mouth daily ; hold for loose stools/diarrhea. 7/19/22  Yes Shaka Rose MD   senna-docusate (SENOKOT-S/PERICOLACE) 8.6-50 MG tablet Take 1-2 tablets by mouth 2 times daily ; hold for loose stools/diarrhea. 7/19/22  Yes Shaka Rose MD   albuterol (PROVENTIL) (2.5 MG/3ML) 0.083% neb solution Take 1 vial (2.5 mg) by nebulization every 6 hours as needed for shortness of breath / dyspnea or wheezing  Patient not taking: Reported on 7/13/2022 1/25/21   Sandra Skinner Ra, APRN CNP     ECG 7/13/22: Sinus rhythm with 1st degree A-V block   Prolonged QT     ECHO 7/13/22: Interpretation Summary     Extremely technically challenging study due to patient body habitus, even with  the use of contrast imaging.     Left ventricular systolic function is mildly reduced. The visual ejection  fraction is estimated at 50%.  Regional wall motion abnormalities cannot be excluded due to limited  visualization.  Right ventricle is not well visualized, however it appears mild-moderately  dilated, and global systolic function is probably mildly reduced.  No significant valvular abnormalities, limited  visualization/assessment.  There is no pericaridal effusion present.     There are no prior studies available for comparison.  ______________________________________________________________________________  Left Ventricle  The left ventricle is normal in size. There is normal left ventricular wall  thickness. Left ventricular systolic function is mildly reduced. The visual  ejection fraction is estimated at 50%. Grade I or early diastolic dysfunction.  Regional wall motion abnormalities cannot be excluded due to limited  visualization.     Right Ventricle  The right ventricle is mild to moderately dilated. The right ventricle is not  well visualized. Right ventricle is not well visualized, however it appears  mild-moderately dilated, and global systolic function is probably mildly  reduced.     Atria  The left atrium is mildly dilated. Right atrium not well visualized.     Mitral Valve  The mitral valve is normal in structure and function.     Tricuspid Valve  The tricuspid valve is not well visualized, but is grossly normal. There is  trace tricuspid regurgitation. The right ventricular systolic pressure is  approximated at 22.8 mmHg plus the right atrial pressure.     Aortic Valve  The aortic valve is not well visualized. Valve morphology is not well  visualized, however it is functioning normally on Doppler interrogation.     Pulmonic Valve  The pulmonic valve is not well seen, but is grossly normal.     Vessels  The aortic root is normal size. Normal size ascending aorta. Dilation of the  inferior vena cava is present with abnormal respiratory variation in diameter.  Positive pressure ventilation.     Pericardium  There is no pericardial effusion.     Rhythm  Sinus rhythm was noted.    Anesthesia Evaluation            ROS/MED HX  ENT/Pulmonary:     (+) SHYLA risk factors, snores loudly, hypertension, obese, tobacco use, Past use,     Neurologic:     (+) CVA,     Cardiovascular:     (+) Dyslipidemia  hypertension-----FITZGERALD.     METS/Exercise Tolerance: 1 - Eating, dressing    Hematologic:       Musculoskeletal:   (+) arthritis,     GI/Hepatic: Comment: Dysphagia due to CVA      Renal/Genitourinary:       Endo:     (+) Obesity (morbid obesity),     Psychiatric/Substance Use:       Infectious Disease:       Malignancy:       Other:            Physical Exam    Airway        Mallampati: III   TM distance: > 3 FB   Neck ROM: full   Mouth opening: > 3 cm    Respiratory Devices and Support         Dental       (+) loose, missing and chipped      Cardiovascular          Rhythm and rate: normal     Pulmonary   pulmonary exam normal        (+) decreased breath sounds           OUTSIDE LABS:  CBC:   Lab Results   Component Value Date    WBC 6.0 07/29/2022    WBC 6.4 07/27/2022    HGB 13.2 07/29/2022    HGB 12.9 07/27/2022    HCT 40.9 07/29/2022    HCT 39.9 07/27/2022     07/29/2022     07/27/2022     BMP:   Lab Results   Component Value Date     07/29/2022     07/28/2022    POTASSIUM 3.5 07/29/2022    POTASSIUM 3.1 (L) 07/29/2022    CHLORIDE 101 07/29/2022    CHLORIDE 101 07/28/2022    CO2 32 07/29/2022    CO2 31 07/28/2022    BUN 11 07/29/2022    BUN 13 07/28/2022    CR 0.64 07/29/2022    CR 0.55 07/28/2022     (H) 07/29/2022     (H) 07/28/2022     COAGS:   Lab Results   Component Value Date    PTT 21 (L) 07/14/2022    INR 1.07 07/14/2022     POC: No results found for: BGM, HCG, HCGS  HEPATIC:   Lab Results   Component Value Date    ALBUMIN 2.2 (L) 11/15/2021    PROTTOTAL 6.0 (L) 11/15/2021    ALT 39 11/15/2021    AST 28 11/15/2021    ALKPHOS 83 11/15/2021    BILITOTAL 0.4 11/15/2021     OTHER:   Lab Results   Component Value Date    PH 7.39 07/14/2022    LACT 1.8 11/06/2021    A1C 5.5 07/13/2022    ASHAI 8.6 07/29/2022    PHOS 2.9 07/27/2022    MAG 2.0 07/27/2022    TSH 2.00 08/25/2017    CRP 26.9 (H) 11/18/2021    SED 38 (H) 08/31/2018       Anesthesia Plan    ASA Status:  4   NPO  Status:  NPO Appropriate    Anesthesia Type: MAC.     - Reason for MAC: chronic cardiopulmonary disease              Consents    Anesthesia Plan(s) and associated risks, benefits, and realistic alternatives discussed. Questions answered and patient/representative(s) expressed understanding.    - Discussed:     - Discussed with:  Patient         Postoperative Care       PONV prophylaxis: Dexamethasone or Solumedrol     Comments:                Ilana Cuellar MD

## 2022-07-30 NOTE — ANESTHESIA CARE TRANSFER NOTE
Patient: Cristy Bailey    Procedure: Procedure(s):  IRRIGATION AND DEBRIDEMENT, PRESSURE ULCER on right flank       Diagnosis: Intertriginous candidiasis [B37.2]  Morbid obesity (H) [E66.01]  Pressure injury of deep tissue of right lower back [L89.136]  Diagnosis Additional Information: No value filed.    Anesthesia Type:   MAC     Note:    Oropharynx: oropharynx clear of all foreign objects and spontaneously breathing  Level of Consciousness: drowsy  Oxygen Supplementation: nasal cannula  Level of Supplemental Oxygen (L/min / FiO2): 2  Independent Airway: airway patency satisfactory and stable  Dentition: dentition unchanged  Vital Signs Stable: post-procedure vital signs reviewed and stable  Report to RN Given: handoff report given  Patient transferred to: PACU    Handoff Report: Identifed the Patient, Identified the Reponsible Provider, Reviewed the pertinent medical history, Discussed the surgical course, Reviewed Intra-OP anesthesia mangement and issues during anesthesia, Set expectations for post-procedure period and Allowed opportunity for questions and acknowledgement of understanding        PACU hold. See intraop notes for CRNA charting and VS in PACU.       Electronically Signed By: ISRAEL Baldwin CRNA  July 30, 2022  10:30 AM

## 2022-07-30 NOTE — OP NOTE
General Surgery Operative Note    PREOPERATIVE DIAGNOSIS: right flank abscess    POSTOPERATIVE DIAGNOSIS: same, cultures pending    PROCEDURE: Excisional debridement of right flank pressure ulcer including skin, subcutaneous fat and muscular fascia    SURGEON:  Mariah Black MD    ASSISTANT:  Christianne Starks MD [facility]  The PA s assistance was medically necessary to provide adequate exposure in the operating field, maintain hemostasis, cutting suture, clamping and ligating bleeding vessels, and visualization of anatomic structures throughout the surgical procedure.       ANESTHESIA:  General.    BLOOD LOSS: 25ml    FINDINGS: full thickness ischemia with eschar and underlying purulent fluid on right flank wound now s/p full thickness debridement, cultures sent    INDICATIONS: Cristy is a 77yof with h/o recent CVA who was found to have cellulitis and a pressure wound on her right flank which progressed to have purulent drainage and is in need of operative debridement. We discussed the risks, benefits, indications and alternatives and she agreed to proceed.       DETAILS OF PROCEDURE: The patient was brought to the operating room per Anesthesia, positioned in left lateral decubitus position on her bed and monitored anesthesia care initiated. Perioperative antibiotics were administered. The right flank was prepped and draped in standard fashion.     A Surgical timeout was then performed, verifying the correct surgeon, site, procedure, and patient and all in the room were in agreement.     There was a 15cm x 6cm wound with central eschar and surrounding necrotic skin on the right lateral flank. We injected local anesthetic. We then excised the wound using a combination of a 15 blade and cautery to excise full thickness skin, deep subcutaneous tissue which was also necrotic down to and including the muscular fascia on the right flank. We sent portions of the specimen for culture. After excising all necrotic tissue  the wound bed appeared healthy and clean. It did tunnel inferomedially 5cm under the skin. The wound was irrigated thoroughly first with saline and then with VASHE solution. We then assured hemostasis with cautery. We packed the wound with vashe soaked kerlix, covered with 4x4s and an ABD. The patient tolerated the procedure well. All instrument, needle and sponge counts were correct at the conclusion of the procedure.     Mariah Black MD

## 2022-07-30 NOTE — PLAN OF CARE
Goal Outcome Evaluation:    Pt here with R MCA stroke, s/p TNK & thrombectomy. A&Ox1-2, disoriented to place, time and situation. Forgetful. Pt frequently yelling out, refused PRN medication for pain. Neuros remain stable. VSS on 2L O2 when sleeping, desats 89-90%. NPO at MN. Ax2-3 w/ lift, turn & repo. Repositioned often. Pain managed w/ scheduled tylenol, PRN oxycodone, ice & repositioning. Wound cares done per POC. Pt removed part of dressing during the night and refused replacement. Incontinent at times. Pt scoring Green on the Aggression Stop Light Tool. Plan for I&D of back wound today. Discharge to  [facility] when cleared.

## 2022-07-30 NOTE — PROGRESS NOTES
[facility]    Hospitalist Progress Note    Assessment & Plan   Cristy Bailey is a 77 year old female with a PMHx significant for morbid obesity, hypertension (not on/needing meds), and hyperlipidemia (not on/needing meds), who presented to [facility] 7/13/2022 with complete left-sided paralysis, left-sided facial droop, and difficulty speaking. CTA head remarkable for right MCA occlusion. She was given tenecteplase and subsequently transferred to [facility] 7/13/2022 for thrombectomy.      Acute right MCA ischemic stroke with edema and right to left midline shift  S/P tenecteplase and subsequent R MCA mechanical thrombectomy 7/13/2022  * Presented to [facility] 7/13 with complete paralysis, left-sided facial droop, and aphasia. Code stroke initiated. Head CT 7/13 showed signs of an evolving R MCA infarct. CTA head 7/13 showed a right carotid terminus occlusion, right middle cerebral artery M1  segment occlusion with poor opacification of the more distal right MCA branches/poor collateral flow. CTA neck 7/13 negative for acute occlusions. Pt given tenecteplase 7/13 at 13:11. Noted to be agitated and was subsequently intubated given need for procedure.   * Subsequently transferred to [facility] 7/13/2022 where she underwent above procedure.   7/14: Extubated. Head CT 7/14 showed evolution of acute infarct involving the R MCA distribution with increased swelling, cortical effacement, and new mild right-to-left midline shift; questionable hypoattenuation involving the bilateral occipital lobes which could be artifactual.   * Additional stroke workup pursued this stay -- echo 7/14 showed EF 50%, grade 1 diastolic dysfunction   * Started ASA and atorvastatin per stroke team.   * Repeat head CT 7/15 showed evolving R MCA stroke with areas of edema, overall stable.  -- conts on full dose ASA and statin  -- goal SBP <140/90  -- cont telemetry while hospitalized, needs 30d cardiac event  monitor at discharge  -- cont PT/OT, family previously desired to take her home but now in agreement with placement (needing assist of 2-3 and use of lift this stay)  -- mgmt of dysphagia as below  -- will need to follow up with [facility] in 6-8 wks     Dysphagia due to CVA  * Seen by SLP and noted with dysphagia. Required some intermittent fluid boluses.  -- cont mechanical soft diet per SLP recs    Dyslipidemia  * FLP this stay showed tot cholest 163, HDL 47, LDL 91, .   * Started on atorvastatin 40 mg daily     Volume overload dt diastolic CHF exacerbation: Improved  Essential hypertension  * Not on/needing meds PTA.   * As above, echo this stay showed EF 50% with grade I diastolic dysfunction; RV not well visualized but appeared mild-moderately dilated with global systolic function probably mildly reduced.   * BPs were initially soft this stay and required IVFs.   * BPs improved, ultimately developed pedal edema required IV Lasix   * Started on lisinopril this stay  -- cont lisinopril 10mg daily  -- cont Lasix 40mg po in AM and 20mg po in afternoon    Prolonged QTc   * Noted on prior EKG done 7/13  -- repeat EKG today to assess QTc while on Levaquin     Bilateral lower extremity chronic swelling with chronic venous stasis dermatitis and chronic skin changes  Hx of LLE cellulitis  * Was hospitalized in 11/2021 for sepsis dt to pneumonia and LLE cellulitis.   * During this stay, BLE noted to be patty and edematous, no unilateral leg swelling appreciated; LLE had patchy areas of erythema, no fluctuance, no painful areas with palpation; legs warm to touch. Lymphedema consulted.   -- cont LE elevation, lymphedema wraps  -- diuresis as above     Lower back wound, suspect from pressure type injury, s/p surgical debridement on 7/30/22  Acute lower back pain, suspect related to lower back wound  OA, with hx of bilateral hip replacements and hx of back pain  Chronic neck pain   * Pt with significant back pain early on in  hospital stay. Was requiring IV hydromorphone.  Dose had to be decreased dt somnolence.  * MRI L-spine in 2018 showed multilevel degenerative changes with mild central stenosis. Patient has chronic back pain, reports having it over the last 2 years.   * During this stay, patient was noted with 5-6cm by 2-3cm wound with swelling/fluid/blistering in a fold of her lower back, did not appear infected; this seemed to be the source of her pain. Padded dressing placed and WOC RN ordered. Pain initially improved.   * Was placed on course of Augmentin on 7/24.   * On 7/26, was re-evaluated by WOC RN. Wound appeared more erythematous and purulence expressed. Worsening with shear stress from lift. Procal <0.05, WBC WNL. Abx changed to IV clindamycin.  * Wound cultures obtained. On 7/28, wound grew proteus. MRSA neg. US neg for abscess.   * Lost IV access on 7/28 so abx were changed to oral clindamycin and oral levaquin.   * General surgery consulted, ultimately underwent excisional debridement of R flank abscess in OR on 7/30.   -- cont oral Levaquin and clindamycin for now while awaiting intraop culture data obtained 7/30  -- routine postop cares and dressing changes  -- WOC RN following  -- prns available for pain  -- cont regular repositioning     Suspected meningioma left parietal region, incidentally seen on CT  * Head CT 7/13 showed a calcified extra-axial mass overlying the left parietal region measuring 1.4 cm, potentially representing a meningioma.  * Will need serial monitoring OP after discharge.      Anemia, suspect dilutional component  * Hgb normal on admit.  Hgb 11.5 on 7/16. No overt clinical signs of major bleeding.  * Hgb now stable at 12-13  -- monitor labs periodically     Intertriginous dermatitis  * Chronic and stable, cont clotrimazole powder     Constipation  * Continue sched bowel regimen     Hypokalemia, hypophosphatemia  * Potassium and phosphorus low at times this hospitalization. Treated with  replacement.    Morbid obesity  * BMI 59 this stay. Recommend aggressive dietary and lifestyle modifications as condition improves     Moderate malnutrition in context of acute illness and chronic disease  * Nutritionist following. On modified diet as above.     FEN: no IVFs, lytes stable, modified diet as above per SLP  DVT Prophylaxis: Lovenox PCDs  Code Status: Full Code    Disposition: Discharge date unclear, pending placement    Darby Martin, DO    Interval History   Seen this afternoon after surgery. Feeling generally well. No complaints. Pain presently controlled. No cp/sob/cough, abd pain/n/v.     -Data reviewed today: I reviewed all new labs and imaging results over the last 24 hours. I personally reviewed no images or EKG's today.    Physical Exam   Temp: 98.1  F (36.7  C) Temp src: Oral BP: 116/63 Pulse: 74   Resp: 16 SpO2: 92 % O2 Device: None (Room air) Oxygen Delivery: 2 LPM  Vitals:    07/16/22 0500 07/23/22 1102 07/28/22 1508   Weight: (!) 171.2 kg (377 lb 6.8 oz) (!) 174.6 kg (385 lb) (!) 159.2 kg (350 lb 14.4 oz)     Vital Signs with Ranges  Temp:  [96.8  F (36  C)-99  F (37.2  C)] 98.1  F (36.7  C)  Pulse:  [] 74  Resp:  [9-28] 16  BP: ()/(51-88) 116/63  SpO2:  [92 %-98 %] 92 %  I/O last 3 completed shifts:  In: 950 [P.O.:450; I.V.:500]  Out: 1600 [Urine:1575; Blood:25]    Constitutional: Resting comfortably, alert and answering basic questions appropriately, NAD  Respiratory: CTAB, no wheeze/rales/rhonchi, no increased work of breathing  Cardiovascular: HRRR, no MGR, ace wraps on bilateral LEs  GI: S, NT, ND, +BS  Skin/Integumen: warm/dry  Other:      Medications     - MEDICATION INSTRUCTIONS -         acetaminophen  975 mg Oral Q8H     aspirin  325 mg Oral Daily     atorvastatin  40 mg Oral QPM     clindamycin  300 mg Oral Q6H DARWIN     diclofenac  2 g Topical 4x Daily     enoxaparin ANTICOAGULANT  40 mg Subcutaneous Q12H     furosemide  20 mg Oral Daily at 4 pm      furosemide  40 mg Oral Daily     lactobacillus rhamnosus (GG)  1 capsule Oral BID     levofloxacin  750 mg Oral Daily     lisinopril  10 mg Oral Daily     miconazole   Topical BID     polyethylene glycol  17 g Oral Daily     pramoxine   Topical TID     QUEtiapine  25 mg Oral At Bedtime     senna-docusate  1-2 tablet Oral BID       Data   Recent Labs   Lab 07/30/22  0727 07/29/22  1448 07/29/22  0717 07/28/22  0825 07/27/22  0833 07/26/22  1542 07/24/22  0507   WBC  --   --  6.0  --  6.4 8.1  --    HGB  --   --  13.2  --  12.9 13.5  --    MCV  --   --  88  --  88 90  --      --  369  --  314 328  --      --  138 138 138  --    < >   POTASSIUM 3.3* 3.5 3.1* 3.4 3.5  --   --    CHLORIDE 102  --  101 101 102  --    < >   CO2 29  --  32 31 29  --    < >   BUN 10  --  11 13 18  --    < >   CR 0.57  --  0.64 0.55 0.57  --   --    ANIONGAP 7  --  5 6 7  --    < >   ASHIA 8.9  --  8.6 8.1* 8.4*  --    < >   *  --  100* 100* 90  --    < >    < > = values in this interval not displayed.       No results found for this or any previous visit (from the past 24 hour(s)).

## 2022-07-30 NOTE — ANESTHESIA POSTPROCEDURE EVALUATION
Patient: Cristy Bailey    Procedure: Procedure(s):  IRRIGATION AND DEBRIDEMENT, PRESSURE ULCER on right flank       Anesthesia Type:  MAC    Note:  Disposition: Inpatient   Postop Pain Control: Uneventful            Sign Out: Well controlled pain   PONV: No   Neuro/Psych: Uneventful            Sign Out: Acceptable/Baseline neuro status   Airway/Respiratory: Uneventful            Sign Out: Acceptable/Baseline resp. status   CV/Hemodynamics: Uneventful            Sign Out: Acceptable CV status; No obvious hypovolemia; No obvious fluid overload   Other NRE: NONE   DID A NON-ROUTINE EVENT OCCUR? No           Last vitals:  Vitals Value Taken Time   /88 07/30/22 1130   Temp     Pulse 70 07/30/22 1130   Resp 15 07/30/22 1130   SpO2 97 % 07/30/22 1130   Vitals shown include unvalidated device data.    Electronically Signed By: Ilana Cuellar MD  July 30, 2022  1:31 PM

## 2022-07-31 NOTE — PROGRESS NOTES
[facility]    Hospitalist Progress Note    Assessment & Plan   Cristy Bailey is a 77 year old female with a PMHx significant for morbid obesity, hypertension (not on/needing meds), and hyperlipidemia (not on/needing meds), who presented to [facility] 7/13/2022 with complete left-sided paralysis, left-sided facial droop, and difficulty speaking. CTA head remarkable for right MCA occlusion. She was given tenecteplase and subsequently transferred to [facility] 7/13/2022 for thrombectomy.      Acute right MCA ischemic stroke with edema and right to left midline shift  S/p tenecteplase and subsequent R MCA mechanical thrombectomy 7/13/22  * Presented to [facility] 7/13 with complete paralysis, left-sided facial droop, and aphasia. Code stroke initiated. Head CT 7/13 showed signs of an evolving R MCA infarct. CTA head 7/13 showed a right carotid terminus occlusion, right middle cerebral artery M1  segment occlusion with poor opacification of the more distal right MCA branches/poor collateral flow. CTA neck 7/13 negative for acute occlusions. Pt given tenecteplase 7/13 at 13:11. Noted to be agitated and was subsequently intubated given need for procedure.   * Subsequently transferred to [facility] 7/13/2022 where she underwent above procedure.   7/14: Extubated. Head CT 7/14 showed evolution of acute infarct involving the R MCA distribution with increased swelling, cortical effacement, and new mild right-to-left midline shift; questionable hypoattenuation involving the bilateral occipital lobes which could be artifactual.   * Additional stroke workup pursued this stay -- echo 7/14 showed EF 50%, grade 1 diastolic dysfunction   * Started ASA and atorvastatin per stroke team.   * Repeat head CT 7/15 showed evolving R MCA stroke with areas of edema, overall stable.  -- conts on full dose ASA and statin  -- goal SBP <140/90  -- cont telemetry while hospitalized, needs 30d cardiac event  monitor at discharge  -- cont Seroquel 25mg HS and 12.5mg po q6h prn (for agitation/restlessness)  -- cont PT/OT, family previously desired to take her home but now in agreement with placement (needing assist of 2-3 and use of lift this stay)  -- mgmt of dysphagia as below  -- will need to follow up with MCN in 6-8 wks     Dysphagia due to CVA  * Seen by SLP and noted with dysphagia. Required some intermittent fluid boluses.  -- cont mechanical soft diet per SLP recs    Dyslipidemia  * FLP this stay showed tot cholest 163, HDL 47, LDL 91, .   * Started on atorvastatin 40 mg daily     Volume overload dt diastolic CHF exacerbation: Improved  Essential hypertension  * Not on/needing meds PTA.   * As above, echo this stay showed EF 50% with grade I diastolic dysfunction; RV not well visualized but appeared mild-moderately dilated with global systolic function probably mildly reduced.   * BPs were initially soft this stay and required IVFs.   * BPs improved, ultimately developed pedal edema required IV Lasix   * Started on lisinopril this stay  -- cont lisinopril 10mg daily  -- cont Lasix 40mg po in AM and 20mg po in afternoon    Prolonged QTc   * EKG on 7/13 showed QTc 494.   * Repeat EKG on 7/30 (while on Levaquin) showed Qtc table at 475.   -- monitoring on telemetry this stay     Chronic bilateral LE edema with chronic venous stasis dermatitis and chronic skin changes  Hx of LLE cellulitis  * Was hospitalized in 11/2021 for sepsis dt to pneumonia and LLE cellulitis.   * During this stay, BLE noted to be patty and edematous, no unilateral leg swelling appreciated; LLE had patchy areas of erythema, no fluctuance, no painful areas with palpation; legs warm to touch. Lymphedema consulted.   -- cont LE elevation, lymphedema wraps  -- diuresis as above     Lower back wound, suspect dt pressure type injury, s/p surgical debridement on 7/30/22  Acute lower back pain, suspect related to lower back wound  OA, with hx of  bilateral hip replacements and hx of back pain  Chronic neck pain   * Pt with significant back pain early on in hospital stay. Was requiring IV hydromorphone.  Dose had to be decreased dt somnolence.  * MRI L-spine in 2018 showed multilevel degenerative changes with mild central stenosis. Patient has chronic back pain, reports having it over the last 2 years.   * During this stay, patient was noted with 5-6cm by 2-3cm wound with swelling/fluid/blistering in a fold of her lower back, did not appear infected; this seemed to be the source of her pain. Padded dressing placed and WOC RN ordered. Pain initially improved.   * Was placed on course of Augmentin on 7/24.   * On 7/26, was re-evaluated by WOC RN. Wound appeared more erythematous and purulence expressed. Worsening with shear stress from lift. Procal <0.05, WBC WNL. Abx changed to IV clindamycin.  * Wound cultures obtained. On 7/28, wound grew proteus. MRSA neg. US neg for abscess.   * Lost IV access on 7/28 so abx were changed to oral clindamycin and oral levaquin.   * General surgery consulted, ultimately underwent excisional debridement of R flank abscess in OR on 7/30.   -- cont oral Levaquin and clindamycin for now while awaiting intraop culture data obtained 7/30  -- routine postop cares and dressing changes  -- WOC RN following  -- prns available for pain  -- cont regular repositioning     Suspected meningioma left parietal region, incidentally seen on CT  * Head CT 7/13 showed a calcified extra-axial mass overlying the left parietal region measuring 1.4 cm, potentially representing a meningioma.  * Will need serial monitoring OP after discharge.      Anemia, suspect dilutional component  * Hgb normal on admit. Hgb 11.5 on 7/16. No overt clinical signs of major bleeding.  * Hgb now stable at 12-13  -- monitor labs periodically     Intertriginous dermatitis  * Chronic and stable, cont clotrimazole powder     Constipation  * Continue sched bowel  regimen     Hypokalemia  Hypophosphatemia  * Potassium and phosphorus low at times this hospitalization. Treated with replacement.    Morbid obesity  * BMI 59 this stay. Recommend aggressive dietary and lifestyle modifications as condition improves     Moderate malnutrition in context of acute illness and chronic disease  * Nutritionist following. On modified diet as above.     FEN: no IVFs, lytes stable, modified diet as above per SLP  DVT Prophylaxis: Lovenox, PCDs  Code Status: Full Code    Disposition: Discharge date unclear, pending placement    Darby Martin, DO    Interval History    Overnight events noted -- agitated and yelling out at times, refused meds. Seen this morning. Agreeable to take meds from AM nurse. Tells me she didn't sleep well last night. Oriented to self/location only. No complaints at present -- no cp/sob/cough, abd pain/n/v. Was given 12.5mg Seroquel prior to my visit and is now feeling sleepy.    -Data reviewed today: I reviewed all new labs and imaging results over the last 24 hours. I personally reviewed no images or EKG's today.    Physical Exam   Temp: 99  F (37.2  C) Temp src: Oral BP: (!) 153/62 Pulse: 94   Resp: 16 SpO2: 91 % O2 Device: None (Room air) Oxygen Delivery: 2 LPM  Vitals:    07/16/22 0500 07/23/22 1102 07/28/22 1508   Weight: (!) 171.2 kg (377 lb 6.8 oz) (!) 174.6 kg (385 lb) (!) 159.2 kg (350 lb 14.4 oz)     Vital Signs with Ranges  Temp:  [98  F (36.7  C)-99  F (37.2  C)] 99  F (37.2  C)  Pulse:  [] 94  Resp:  [9-28] 16  BP: ()/(51-88) 153/62  SpO2:  [91 %-98 %] 91 %  I/O last 3 completed shifts:  In: 600 [P.O.:100; I.V.:500]  Out: 1050 [Urine:1025; Blood:25]    Constitutional: Resting comfortably, tired appearing but oriented to self/location and answering basic questions appropriately, NAD  Respiratory: CTAB, no wheeze/rales/rhonchi, no increased work of breathing  Cardiovascular: HRRR, no MGR, ace wraps on bilateral LEs  GI: obese, S, NT, ND,  +BS  Skin/Integumen: warm/dry, wound not directly examined by me  Other:      Medications     - MEDICATION INSTRUCTIONS -         acetaminophen  975 mg Oral Q8H     aspirin  325 mg Oral Daily     atorvastatin  40 mg Oral QPM     clindamycin  300 mg Oral Q6H DARWIN     diclofenac  2 g Topical 4x Daily     enoxaparin ANTICOAGULANT  40 mg Subcutaneous Q12H     furosemide  20 mg Oral Daily at 4 pm     furosemide  40 mg Oral Daily     lactobacillus rhamnosus (GG)  1 capsule Oral BID     levofloxacin  750 mg Oral Daily     lisinopril  10 mg Oral Daily     miconazole   Topical BID     polyethylene glycol  17 g Oral Daily     pramoxine   Topical TID     QUEtiapine  25 mg Oral At Bedtime     senna-docusate  1-2 tablet Oral BID       Data   Recent Labs   Lab 07/31/22  0645 07/30/22  1647 07/30/22  0727 07/29/22  1448 07/29/22  0717 07/28/22  0825 07/27/22  0833   WBC 6.8  --   --   --  6.0  --  6.4   HGB 14.4  --   --   --  13.2  --  12.9   MCV 92  --   --   --  88  --  88     --  380  --  369  --  314     --  138  --  138   < > 138   POTASSIUM 3.7 3.8 3.3*   < > 3.1*   < > 3.5   CHLORIDE 102  --  102  --  101   < > 102   CO2 28  --  29  --  32   < > 29   BUN 11  --  10  --  11   < > 18   CR 0.62  --  0.57  --  0.64   < > 0.57   ANIONGAP 5  --  7  --  5   < > 7   ASHIA 8.9  --  8.9  --  8.6   < > 8.4*   GLC 97  --  101*  --  100*   < > 90    < > = values in this interval not displayed.       No results found for this or any previous visit (from the past 24 hour(s)).

## 2022-07-31 NOTE — PROGRESS NOTES
"General Surgery Progress Note    Assessment and Plan:  Patient is a 77 YOF with history of obesity and hypertension who was found to have a CVA  and underwent thrombectomy post op course complicated by right flank pressure wound s/p excisional debridement . Patient recovering appropriately, dressing changed today and wound appears healthy.  - Continue daily dressing changes with Vashe soaked kerlix and ABD- appreciate [facility] nurse cares  - Rest of cares per primary team    Interval Hx:   Patient resting comfortably in bed. Per nurse patient did not sleep well and had episodes of behaviors. Patient states her back was not bothering her until we did the dressing change    Exam:  Vitals: Blood pressure (!) 153/62, pulse 94, temperature 99  F (37.2  C), temperature source Oral, resp. rate 16, height 1.676 m (5' 6\"), weight (!) 159.2 kg (350 lb 14.4 oz), SpO2 91 %, not currently breastfeeding.  Temperature Temp  Av.3  F (36.8  C)  Min: 98  F (36.7  C)  Max: 99  F (37.2  C)   I/O last 3 completed shifts:  In: 600 [P.O.:100; I.V.:500]  Out: 1050 [Urine:1025; Blood:25]    Gen: Awake and in no apparent distress. Mild agitation  Lungs: No increased work in breathing.  Flank: Right mid back wound roughly 15x6x3 with beefy red tissue at base      Data:    Recent Labs   Lab Test 22  0645 22  0727 22  0717 22  0833   WBC 6.8  --  6.0 6.4   HGB 14.4  --  13.2 12.9   HCT 46.4  --  40.9 39.9    [phone]      Christianne Starks MD  9:30 AM      "

## 2022-07-31 NOTE — PROGRESS NOTES
"Repositioned in bed to allow for proper positioning to take medications.    Patient began to scream out, \"BOBBI, BOBBI!  WHY ARE YOU DOING THIS TO ME?\"      \"I can't believe I ever told you your hair looked pretty!\"    \"PUT ME ON THE COUCH!  I'M GOING TO SHIT ALL OVER YOUR COUCH!\"    Swung at writer, pinched, and attempted to bite writers arm.  Continued to yell out for help.    Refused meds, hitting the applesauce away.  "

## 2022-07-31 NOTE — PLAN OF CARE
Goal Outcome Evaluation:    Plan of Care Reviewed With: patient     Overall Patient Progress: no change    Outcome Evaluation: Refusing meds, yelling out and aggressive with staff    DATE & TIME: 7/30/22 2300 - 7/31/22 0700    COGNITION/BEHAVIOR: Alert to self.  Inappropriate with staff, pulling at clothes, attempting to bite, yelling, etc.  Vital signs: Refused  MOBILITY: Total, turn/repo  PAIN: Yelling out in 10/10 pain, refusing meds or other interventions  DIET: Mechanical soft; hit applesauce out of writers hands, then took some on her finger and licked it  RESP: No apparent distress  CV: No reports of chest pain  GI/: Incontinent; purewick in place and changed at end of shift  PV/NV: Lymph wraps on, no PCD's  SKIN: Right flank wound with dressing in place.  Lower extremity lymph wraps in place.  LINES: PIV saline locked

## 2022-08-01 NOTE — PLAN OF CARE
Goal Outcome Evaluation:    Pt Alert to self, yells out frequently. VSS on RA. Up A2 lift. Diet mechanical dental soft. IVSL. PRN oxy & atarax, scheduled tylenol for pain. Incontinent, puire. Consults: WOC, lymphedema, PT, OT, SLP. Discharge TBD

## 2022-08-01 NOTE — PROGRESS NOTES
[facility]  WOC Nurse Inpatient Assessment     Consulted for: re-evaluate mid back wound    Areas Assessed:      Areas visualized during today's visit: Back, posterior waist crease    Wound location: Posterior skin fold at waist      /  8/1/22 s/p debridement lower right back crease            7/26 Right posterior deep crease      7/28 Right posterior Deep waist crease    Last Photo: Additional photos in chart    Wound due to: Wound due to: Hospital Acquired Pressure injury  Stage: Unstageable 7/28, Following surgical debridement 7/30 Stage 3   Pressure Injury and Moisture Associated Skin Damage (MASD), suspect intertriginous pressure appears to be deeper tissue damage within a crease, possible shear from lift.    Wound history/plan of care: Pt frequently refusing repositioning and turning due to back pain. She is resistive to cares due to pain even after medication.    Dr. Ridley noted injury 7/16 with photo in the chart. WOC assessed  area 7/18 and noted blanchable redness and intact tissue. WOC reconsulted 7/26 and noted deteriorating wound. Pt with BMI of 60 with large deep creases and folds that trap moisture. Entire area with diffuse erythema and very painful. Pt resting in bed laying more on her left side. WOC text paged Dr. Howe and requested wound culture order 7/28 and nursing sent to lab.     8/1 with slight odor, drainage and pain WOC recommend doing wet to dry with vashe, however pt would benefit VAC in near future. WOC will continue to assess 2x/wk as avaialble    Wound base: 10 % yellow/tan tissue, 90% dark red non granular moist tissue. Deep crease in the wound from 7-11 approx 5.5cm in depth.Linear moisture split at 9 O'clock 0.3cm x 3cm with pink dermis.     Palpation of the wound bed: slightly fluctuant, friable and bleeding.     Drainage: moderate     Description of drainage: serosanguinous, bloody and dressing saturated with tan/grey appearance     Measurements (length  x width x depth, in cm): Approximately 6cm  x 15cm   x  6 cm (measurments dependent on positioning due to body habitus)     Tunneling: N/A     Undermining: N/A  Periwound skin: Edematous, Indurated and deep purple/red erythma      Color: purple and red      Temperature: warm  Odor: mild  Pain: moderate, aching and throbbing during dressing change  Pain interventions prior to dressing change: slow and gentle cares , pain medication prior to wound care-see MAR  Treatment goal: Drainage control, Infection control/prevention, Increase granulation, Pain control and Prepare wound bed for negative pressure wound therapy (wound vac)  STATUS: s/p debridement by surgery 7/30, heavily bleeding with mild odor and moderate pain  Supplies ordered: at bedside       Wound Location: Buttock        Wound due to: Blanchable erythema, not currently a pressure injury  Wound history/plan of care: Pt with large body habitus and difficulty with repositioning due to back pain. No changes 8/1 remains blanchable  Wound base: 100 % blanchable      Palpation of the wound bed: normal      Drainage: none     Description of drainage: none     Measurements (length x width x depth, in cm): 1.5  x 5  x  0 cm      Tunneling: N/A     Undermining: N/A  Periwound skin: Intact and hyperpigmentation, venous congestion and chronic skin changes      Color: pink, purple and brown      Temperature: normal   Odor: none  Pain: denies , none  Pain interventions prior to dressing change: N/A  Treatment goal: Protection  STATUS: initial assessment  Supplies ordered: none necessary    - Buttock intact at this time and erythema most likely due to linens underneath patient. I feel adding a dressing would add additional material at this time and wouldn't be appropriate. Continue to encourage PIP measures    -BL legs with edema wear, dry flaking skin with hemosiderin staining. Legs weeping from areas that appear to spontaneously open and close. Noted drainage on Coviden  "and then no skin issues able to be found.        Pressure Injury Prevention (PIP) Plan:  If patient is declining pressure injury prevention interventions: Explore reason why and address patient's concerns, Educate on pressure injury risk and prevention intervention(s), If patient is still declining, document \"informed refusal\"  and Ensure Care team is aware ( provider, charge nurse, etc)  Mattress: Follow bed algorithm, reassess daily and order specialty mattress, if indicated.  HOB: Maintain at or below 30 degrees, unless contraindicated  Repositioning in bed: Every 1-2 hours , Left/right positioning; avoid supine and Raise foot of bed prior to raising head of bed, to reduce patient sliding down (shear)  Heels: Keep elevated off mattress and Pillows under calves  Protective Dressing: None  Positioning Equipment: None  Chair positioning: Chair cushion (#[id number])  and Assist patient to reposition hourly   If patient has a buttock pressure injury, or high risk for PI use chair cushion or SPS.  Moisture Management: Perineal cleansing /protection: Follow Incontinence Protocol, Avoid brief in bed and Clean and dry skin folds with bathing   Under Devices: Inspect skin under all medical devices during skin inspection  and Ensure tubes are stabilized without tension  Ask provider to discontinue device when no longer needed.        Treatment Plan:     Right Lower back crease: Daily and PRN  1. Remove old dressing  2. Soak gauze and clean directly around periwound and spiral outward to clean entire area.  3. Moisten a large piece of kerlix and wring out excess moisture. Pack wound focusing at depth of wound from 7-11 O'clock first and then zigzag gauze back and forth across wound to ensure kerlix comes into contact with the entire wound base. Be careful not to over pack the wound as this can delay healing.   3. Apply Cavilon No Sting Skin Prep (#879550) to periwound and allow to dry.  4.  Cover wound with ABD and secure with " tape.   5. Apply a thin layer of barrier cream to small area of broken skin at 9 O'clock and leave this area open to air  6. Apply 1 single Covidien Pad (Blue Pad) underneath patient to collect and drainage that leaks out.   7. Time and date dressing change    -Reposition pt side to side ever when in bed, every 2 hours-get the pt way over on side to completely offload pressure. This will benefit skin and respiratory function   -Keep heels elevated and floating on pillows at all times. Try using at least 2 pillows under each calf.  -When up to the chair pt needs to fully offload every 2 hours and use a chair cushion if needed     Pannus: BID and PRN  1.Cleanse the area with Azra cleanse and protect and marga dry wipes/soft cloth.   2. Apply ostomy powder (#7639) on all reddened or denuded skin.   3. Apply nickel thick layer of Triad paste (#[id number]) to wound bed and thin layer over reddened areas   4. Ok to use Interdry AG to all other intact tissue. Ensure at least 2 inch tail is left outside of body to wick moisture. Single layer pillow case can also be used to help keep pannus dry if interdry is not staying well.     **If complete removal of paste is necessary use baby oil/mineral oil (Located in Pharmacy) and soft wash cloth.       Orders: Written and Updated    RECOMMEND PRIMARY TEAM ORDER: None, at this time  Education provided: importance of repositioning, plan of care, Infection prevention  and Off-loading pressure  Discussed plan of care with: Patient and Nurse  WOC nurse follow-up plan: 2-3x/wk as available  Notify WOC if wound(s) deteriorate.  Nursing to notify the Provider(s) and re-consult the WOC Nurse if new skin concern.    DATA:     Current support surface: Bariatric Low air loss mattress on pulsate  Containment of urine/stool: Incontinence Protocol and Incontinent pad in bed  BMI: Body mass index is 56.64 kg/m .   Active diet order: Orders Placed This Encounter      Advance Diet as Tolerated       Mechanical/Dental Soft Diet     Output: I/O last 3 completed shifts:  In: 200 [P.O.:200]  Out: 400 [Urine:400]     Labs:   Recent Labs   Lab 07/31/22  0645   HGB 14.4   WBC 6.8     Pressure injury risk assessment:   Sensory Perception: 3-->slightly limited  Moisture: 3-->occasionally moist  Activity: 2-->chairfast  Mobility: 2-->very limited  Nutrition: 2-->probably inadequate  Friction and Shear: 1-->problem  Dmeetrio Score: 13    Telly Meyer RN CWOCN   Dept. Pager: [phone]  Dept. Office Number: [phone]

## 2022-08-01 NOTE — PLAN OF CARE
Goal Outcome Evaluation:    Plan of Care Reviewed With: patient     Overall Patient Progress: improving       Pt here with right MCA stroke. A&O to self. Neuros include left side paralysis, and weakness. VSS. Mechanical soft diet, thin liquids. Takes pills whole one by one with apple juice or water. Up with A2 + lift. Denies pain. Pt scoring green on the Aggression Stop Light Tool for disorientation and anxiety. Plan to discharge when medically ready.

## 2022-08-01 NOTE — PLAN OF CARE
Date & Time: 8/1 [id number]  Diagnosis: CVA s/p TNK  Procedures: 7/13 - thrombectomy, 8/2 - possible wound vac placement  Orientation/Cognitive: AOx2, very anxious/fearful  VS/O2: VSS, RA  Mobility: A2-3 + lift, occasionally refuses turn/repo  Diet: Soft & bite size   Pain Management: PRN atarax, oxycodone, scheduled tylenol  Bowel & Bladder: Incontinent, Purewick patent, BMx1  Skin: Scattered weeping wounds BLE - JH, L back wound - CDI/wound care done, open areas under panus - wound care done, lymphedema BLE - refused wraps    Abnormal Labs: NA  Tele: NA  IV Access/Drips/Fluids: R PIVx2 SL   Drains: Purewick   Tests: CT/MRI - R MCA, back wound - gram +, abd US - negative, ECHO - EF 50%  Consults: Neurology, general surgery, hospitalist   Discharge Plan: Pending - placement   Other: Neuros - LUE weak, BLE weak, very slight L facial droop

## 2022-08-01 NOTE — PROGRESS NOTES
[facility]    Hospitalist Progress Note    Assessment & Plan   Cristy Bailey is a 77 year old female with a PMHx significant for morbid obesity, hypertension (not on/needing meds), and hyperlipidemia (not on/needing meds), who presented to [facility] 7/13/2022 with complete left-sided paralysis, left-sided facial droop, and difficulty speaking. CTA head remarkable for right MCA occlusion. She was given tenecteplase and subsequently transferred to [facility] 7/13/2022 for thrombectomy.      Acute right MCA ischemic stroke with edema and right to left midline shift  S/p tenecteplase and subsequent R MCA mechanical thrombectomy 7/13/22  * Presented to [facility] 7/13 with complete paralysis, left-sided facial droop, and aphasia. Code stroke initiated. Head CT 7/13 showed signs of an evolving R MCA infarct. CTA head 7/13 showed a right carotid terminus occlusion, right middle cerebral artery M1  segment occlusion with poor opacification of the more distal right MCA branches/poor collateral flow. CTA neck 7/13 negative for acute occlusions. Pt given tenecteplase 7/13 at 13:11. Noted to be agitated and was subsequently intubated given need for procedure.   * Subsequently transferred to [facility] 7/13/2022 where she underwent above procedure.   7/14: Extubated. Head CT 7/14 showed evolution of acute infarct involving the R MCA distribution with increased swelling, cortical effacement, and new mild right-to-left midline shift; questionable hypoattenuation involving the bilateral occipital lobes which could be artifactual.   * Additional stroke workup pursued this stay -- echo 7/14 showed EF 50%, grade 1 diastolic dysfunction   * Started ASA and atorvastatin per stroke team.   * Repeat head CT 7/15 showed evolving R MCA stroke with areas of edema, overall stable.  -- conts on full dose ASA and statin  -- goal SBP <140/90  -- cont telemetry while hospitalized, needs 30d cardiac event  monitor at discharge  -- cont Seroquel 25mg HS and 12.5mg po q6h prn (for agitation/restlessness)  -- cont PT/OT, family previously desired to take her home but now in agreement with placement (needing assist of 2-3 and use of lift this stay)  -- mgmt of dysphagia as below  -- will need to follow up with [facility] in 6-8 wks     Dysphagia due to CVA  * Seen by SLP and noted with dysphagia. Required some intermittent fluid boluses.  -- cont mechanical soft diet per SLP recs    Dyslipidemia  * FLP this stay showed tot cholest 163, HDL 47, LDL 91, .   * Started on atorvastatin 40 mg daily     Volume overload dt diastolic CHF exacerbation: Improved  Essential hypertension  * Not on/needing meds PTA.   * As above, echo this stay showed EF 50% with grade I diastolic dysfunction; RV not well visualized but appeared mild-moderately dilated with global systolic function probably mildly reduced.   * BPs were initially soft this stay and required IVFs.   * BPs improved, ultimately developed pedal edema required IV Lasix   * Started on lisinopril this stay  -- cont lisinopril 10mg daily  -- cont Lasix 40mg po in AM and 20mg po in afternoon    Prolonged QTc   * EKG on 7/13 showed QTc 494.   * Repeat EKG on 7/30 (while on Levaquin) showed QTc table at 475.   -- monitoring on telemetry this stay     Chronic bilateral LE edema with chronic venous stasis dermatitis and chronic skin changes  Hx of LLE cellulitis  * Was hospitalized in 11/2021 for sepsis dt to pneumonia and LLE cellulitis.   * During this stay, BLE noted to be patty and edematous, no unilateral leg swelling appreciated; LLE had patchy areas of erythema, no fluctuance, no painful areas with palpation; legs warm to touch. Lymphedema consulted.   -- cont LE elevation, lymphedema wraps  -- diuresis as above     Lower back/R flank wound, suspect dt pressure type injury, s/p surgical debridement on 7/30/22  OA  Hx of bilateral hip replacements   Hx of chronic neck and back  pain  * Pt with significant back pain early on in hospital stay. Was requiring IV hydromorphone.  Dose had to be decreased dt somnolence.  * MRI L-spine in 2018 showed multilevel degenerative changes with mild central stenosis. Patient has chronic back pain, reports having it over the last 2 years.   * During this stay, patient was noted with 5-6cm by 2-3cm wound with swelling/fluid/blistering in a fold of her lower back, did not appear infected; this seemed to be the source of her pain. Padded dressing placed and WOC RN ordered. Pain initially improved.   * Was placed on course of Augmentin on 7/24.   * On 7/26, was re-evaluated by WOC RN. Wound appeared more erythematous and purulence expressed. Worsening with shear stress from lift. Procal <0.05, WBC WNL. Abx changed to IV clindamycin.  * Wound cultures obtained. On 7/28, wound grew proteus. MRSA neg. US neg for abscess.   * Lost IV access on 7/28 so abx were changed to oral clindamycin and oral levaquin.   * General surgery consulted, ultimately underwent excisional debridement of R flank abscess in OR on 7/30.  -- has been on abx since 7/24   -- cont oral Levaquin and clindamycin for now while awaiting intraop culture data obtained 7/30 (today is d#9 of abx)  -- routine postop cares and dressing changes per gen surgery -- recommended wound vac  -- WOC RN following  -- prns available for pain  -- cont regular repositioning     Suspected meningioma left parietal region, incidentally seen on CT  * Head CT 7/13 showed a calcified extra-axial mass overlying the left parietal region measuring 1.4 cm, potentially representing a meningioma.  * Will need serial monitoring OP after discharge.      Anemia, suspect dilutional component  * Hgb normal on admit. Hgb 11.5 on 7/16. No overt clinical signs of major bleeding.  * Hgb now stable at 12-13  -- monitor labs periodically     Intertriginous dermatitis  * Chronic and stable, cont clotrimazole powder     Constipation  *  Continue sched bowel regimen     Hypokalemia  Hypophosphatemia  * Potassium and phosphorus low at times this hospitalization. Treated with replacement.    Morbid obesity  * BMI 59 this stay. Recommend aggressive dietary and lifestyle modifications as condition improves     Moderate malnutrition in context of acute illness and chronic disease  * Nutritionist following. On modified diet as above.     FEN: no IVFs, lytes stable, modified diet as above per SLP  DVT Prophylaxis: Lovenox, PCDs  Code Status: Full Code    Disposition: Discharge date unclear, pending placement    Darby Martin,     Interval History    Seemed to have a better night -- yelling out at times but not as agitated as the night before. Seen this morning. Some situational confusion. Reporting some pain on her R side in lower back at site of debridement. No cp/sob/cough, abd pain/n/v.     -Data reviewed today: I reviewed all new labs and imaging results over the last 24 hours. I personally reviewed no images or EKG's today.    Physical Exam   Temp: 98.2  F (36.8  C) Temp src: Oral BP: 109/58 Pulse: 61   Resp: 16 SpO2: 92 % O2 Device: None (Room air)    Vitals:    07/16/22 0500 07/23/22 1102 07/28/22 1508   Weight: (!) 171.2 kg (377 lb 6.8 oz) (!) 174.6 kg (385 lb) (!) 159.2 kg (350 lb 14.4 oz)     Vital Signs with Ranges  Temp:  [97.7  F (36.5  C)-98.2  F (36.8  C)] 98.2  F (36.8  C)  Pulse:  [61-83] 61  Resp:  [16] 16  BP: (109-121)/(58-66) 109/58  SpO2:  [91 %-92 %] 92 %  I/O last 3 completed shifts:  In: 200 [P.O.:200]  Out: 400 [Urine:400]    Constitutional: Resting comfortably, oriented to self, thinks she's in a hospital in [place], unable to tell me the year, +situational confusion, NAD   Respiratory: CTAB, no wheeze/rales/rhonchi, no increased work of breathing  Cardiovascular: HRRR, no MGR, ace wraps on bilateral LEs  GI: obese, S, NT, ND, +BS  Skin/Integumen: warm/dry, wound not directly examined by me  Other:      Medications     -  MEDICATION INSTRUCTIONS -         acetaminophen  975 mg Oral Q8H     aspirin  325 mg Oral Daily     atorvastatin  40 mg Oral QPM     clindamycin  300 mg Oral Q6H DARWIN     diclofenac  2 g Topical 4x Daily     enoxaparin ANTICOAGULANT  40 mg Subcutaneous Q12H     furosemide  20 mg Oral Daily at 4 pm     furosemide  40 mg Oral Daily     lactobacillus rhamnosus (GG)  1 capsule Oral BID     levofloxacin  750 mg Oral Daily     lisinopril  10 mg Oral Daily     miconazole   Topical BID     polyethylene glycol  17 g Oral Daily     pramoxine   Topical TID     QUEtiapine  25 mg Oral At Bedtime     senna-docusate  1-2 tablet Oral BID       Data   Recent Labs   Lab 07/31/22  0645 07/30/22  1647 07/30/22  0727 07/29/22  1448 07/29/22  0717 07/28/22  0825 07/27/22  0833   WBC 6.8  --   --   --  6.0  --  6.4   HGB 14.4  --   --   --  13.2  --  12.9   MCV 92  --   --   --  88  --  88     --  380  --  369  --  314     --  138  --  138   < > 138   POTASSIUM 3.7 3.8 3.3*   < > 3.1*   < > 3.5   CHLORIDE 102  --  102  --  101   < > 102   CO2 28  --  29  --  32   < > 29   BUN 11  --  10  --  11   < > 18   CR 0.62  --  0.57  --  0.64   < > 0.57   ANIONGAP 5  --  7  --  5   < > 7   ASHIA 8.9  --  8.9  --  8.6   < > 8.4*   GLC 97  --  101*  --  100*   < > 90    < > = values in this interval not displayed.       No results found for this or any previous visit (from the past 24 hour(s)).

## 2022-08-01 NOTE — PROGRESS NOTES
"General Surgery Progress Note    Assessment and Plan:  Patient is a 77 YOF with history of obesity and hypertension who was found to have a CVA  and underwent thrombectomy post op course complicated by right flank pressure wound s/p excisional debridement . Patient recovering appropriately.   - Reviewed [facility] recommendation and surgery team will plan to perform wound vac to right flank tomorrow afternoon  - Rest of cares per primary team    Interval Hx:   Patient states the wound is less painful today. She still feels very anxious    Exam:  Vitals: Blood pressure 101/49, pulse 65, temperature 97.6  F (36.4  C), temperature source Oral, resp. rate 16, height 1.676 m (5' 6\"), weight (!) 159.2 kg (350 lb 14.4 oz), SpO2 92 %, not currently breastfeeding.  Temperature Temp  Av.3  F (36.8  C)  Min: 98  F (36.7  C)  Max: 99  F (37.2  C)   I/O last 3 completed shifts:  In: 200 [P.O.:200]  Out: 400 [Urine:400]    Gen: Awake and in no apparent distress. Mild agitation  Lungs: No increased work in breathing.  Flank: Reviewed imaging of wound taken by WO team. Appears to have some brown exudate that would benefit from wound vac      Data:    Recent Labs   Lab Test 22  0645 22  0727 22  0717 22  0833   WBC 6.8  --  6.0 6.4   HGB 14.4  --  13.2 12.9   HCT 46.4  --  40.9 39.9    380 369 314      Christianne Starks MD  9:30 AM      "

## 2022-08-02 NOTE — PROGRESS NOTES
WOC Assessment    WOC met at bedside with surgery to discuss plan for lower back crease, see Surgical note for physical exam of wound today. Wound with 2 new tunneled areas at 6 O'clock in wound base. Tissue continues to appear dark, dusky and lifeless and after 24 hours of vashe soaks surgery determined wound vac to be most appropriate, discussed benefit of Vac instill in future. At this time surgery will plan to manage wound vac this week with next change Thursday or Friday and they will communicate with WOC ongoing plan. Surgery will have WOC most likely take Vac over next week.     Telly Meyer RN CWOCN

## 2022-08-02 NOTE — PLAN OF CARE
Shift Note 0700:   Patient admitted to unit for 1500. Assist x2-3 with lift. VSS. C/o constant severe pain, repositioning, medications and ice applied. Aggression Stop Light: green-yellow-patient uncooperative with assessments, repeated requests. Wound vac applied at bedside during shift. Writer attempted multiple times to complete thorough assessment on patient and pt uncooperative, repeating requests and not willing to complete assessment due to pain after multiple interventions.    Major Shift Events: wound vac applied

## 2022-08-02 NOTE — PLAN OF CARE
Pt disoriented to situation and time. VSS on room air. Lung sounds diminished. R back dressing w/ serosanguinous drainage. Incontinent of bladder. No BM. Up w/ lift. Oxy for pain. Turn and repo

## 2022-08-02 NOTE — PROGRESS NOTES
Notified Person Name: [facility] RNTelly and Christianne HAAS MD     Notification Date/Time: 8/2/22 6519     Purpose of Notification or Copy of Page: Orders for Wound Vac/ updated orders     Comments: Wound vac applied during morning shift, no orders present for wound vac/ cares. [facility] nurse called and stated surgery is in charge of orders for wound vac. Message left for Christianne HAAS MD via [phone] cell, who applied wound vac.

## 2022-08-02 NOTE — CONSULTS
[facility]    Infectious Disease Consultation     Date of Admission:  7/13/2022  Date of Consult : 08/02/22    Assessment:   1. Right flank/back wound due to pressure injury with infection. S/p surgical debridement with polymicrobial cx-MSSA, proteus mirabilis and E.fecalis. Has wound vac in place, images reviewed  2. Recent R MCA CVA s/p thrombolytic therapy and mechanical thrombectomy on 7/13 with resolution of neurological deficits  3. LE lymphedema  4. Chronic medical conditions - Obesity, diastolic CHF, HTN, venous stasis with stasis dermatitis, bilateral hip replacement    Recommendations:  1. Discontinue Levaquin/Clindamycin  2. Treat with Augmentin for 5-7 days  3. Local wound care    Mere Demarco MD    Reason for Consult   I was asked to evaluate this patient for antimicrobial recommendations for wound infection    Primary Care Physician   Manoj Daley    Chief Complaint   Pressure ulcer with infection    History is obtained from the patient and medical records    History of Present Illness   Cristy Bailey is a 77 year old female with morbid obesity, chronic LE stasis , lymphedema, diastolic CHF and HTN who has been hospitalized since 7/13 due to an acute R MCA CVA which has been managed with  tenecteplase and subsequent R MCA mechanical thrombectomy 7/13/22 with resolution of neurological deficits.    She has had exacerbation of diastolic CHF and during this hospitals khal[place] she developed a right flank ./ lower back pressure injury which has required surgical debridement on 7/30. Cxs are polymicrobial with proteus mirabilis, MSSA and E.fecalis. She has been maintained with a wound vac and is on oral Levaquin/Clindamycin and ID has been asked to assist with antibiotic management.    Past Medical History   I have reviewed this patient's medical history and updated it with pertinent information if needed.   Past Medical History:   Diagnosis Date     Arthritis     hip     HTN  (hypertension) 1/2010      Leg weakness 3/24/2015     Lyme disease 1980'S AND 2004    lYME DZ LIKE SXS RESPONDED TO ABX       Past Surgical History   I have reviewed this patient's surgical history and updated it with pertinent information if needed.  Past Surgical History:   Procedure Laterality Date     ARTHROPLASTY HIP Right 7/30/2018    Procedure: ARTHROPLASTY HIP;  Right total hip arthroplasty;  Surgeon: Bry Garcia MD;  Location: [facility] OR     ARTHROPLASTY HIP Left 2/12/2019    Procedure: Left total hip arthroplasty ([employer] 60 mm acetabulum with 2 screws; #15 standard neck Synergy stem; +0 neck 36 mm head) (extra difficult case because of her high BMI and deep subcutaneous fatty layer resulting in the operative time at least twice as long from typical operative time);  Surgeon: Chavo Rodriguez MD;  Location: [facility] OR     BIOPSY OF UTERUS LINING  3/2010    negative.      COLONOSCOPY  2/21/10    benign findings. advised 10 yr f/u.      INCISION AND DRAINAGE HIP, COMBINED Right 8/29/2018    Procedure: COMBINED INCISION AND DRAINAGE HIP;  Incision and debridement, right hip ;  Surgeon: Bry Garcia MD;  Location: [facility] OR     IR CAROTID CEREBRAL ANGIOGRAM BILATERAL  7/13/2022     IRRIGATION AND DEBRIDEMENT DECUBITUS WOUND, COMBINED Right 7/30/2022    Procedure: IRRIGATION AND DEBRIDEMENT, PRESSURE ULCER on right flank;  Surgeon: Mariah Black MD;  Location: [facility] OR     ORTHOPEDIC SURGERY Right 08/29/2018    Right hip I & D 8/26/18, Right JENAE, DOS 7/30/2018, Dr. Garcia. [facility]     ZZC C-SEC ONLY,PREV C-SEC      c-sec x 3        Prior to Admission Medications   Prior to Admission Medications   Prescriptions Last Dose Informant Patient Reported? Taking?   ACETAMINOPHEN PO Past Week at prn  Yes Yes   Sig: Take by mouth every 8 hours as needed for pain   albuterol (PROVENTIL) (2.5 MG/3ML) 0.083% neb solution Not Taking at Unknown time  No No   Sig: Take 1 vial (2.5 mg) by  nebulization every 6 hours as needed for shortness of breath / dyspnea or wheezing   Patient not taking: Reported on 2022   clotrimazole (LOTRIMIN) 1 % external cream Unknown at Unknown time  No No   Sig: Apply topically 2 times daily      Facility-Administered Medications: None     Allergies   Allergies   Allergen Reactions     No Known Drug Allergies        Immunization History   Immunization History   Administered Date(s) Administered     Influenza (High Dose) 3 valent vaccine 2012, 10/23/2015, 2017     Influenza (IIV3) PF 10/11/2008, 2014     TDAP Vaccine (Boostrix) 2010       Social History   I have reviewed this patient's social history and updated it with pertinent information if needed. Cristy Bailey  reports that she quit smoking about 38 years ago. She has never used smokeless tobacco. She reports that she does not drink alcohol and does not use drugs.    Family History   I have reviewed this patient's family history and updated it with pertinent information if needed.   Family History   Problem Relation Age of Onset     Cerebrovascular Disease Father         -stroke at age 80     Family History Negative Mother          Diedn her 80's of unknown causes.  Pt otherwise unaware of her health hx.      Unknown/Adopted Mother      Diabetes Brother         (Christian)  of DM complications at age 50.     Alcohol/Drug Brother         Christian     C.A.D. Brother         Christian.      Heart Disease Brother         Christian--MI age 50.     Family History Negative Brother      Family History Negative Brother      Family History Negative Sister      Family History Negative Sister      Family History Negative Son      Family History Negative Daughter      Family History Negative Daughter        Review of Systems   The 10 point Review of Systems is negative other than noted in the HPI or here.     Physical Exam   Temp: 98.1  F (36.7  C) Temp src: Oral BP: 132/57 Pulse: 76   Resp: 18  SpO2: 91 % O2 Device: None (Room air)    Vital Signs with Ranges  Temp:  [97.8  F (36.6  C)-98.7  F (37.1  C)] 98.1  F (36.7  C)  Pulse:  [76-84] 76  Resp:  [16-19] 18  BP: ()/(57-66) 132/57  SpO2:  [91 %-93 %] 91 %  350 lbs 14.4 oz  Body mass index is 56.64 kg/m .    GENERAL APPEARANCE:  awake  EYES: Eyes grossly normal to inspection  NECK: symmetric  RESP: non labored breathing  ABDOMEN: obese, right flank wound vac  MS: extensive  Lymphedema and stasis changes    Data   All laboratory data reviewed  Component      Latest Ref Rng & Units 8/2/2022   WBC      4.0 - 11.0 10e3/uL 8.2   RBC Count      3.80 - 5.20 10e6/uL 4.62   Hemoglobin      11.7 - 15.7 g/dL 13.1   Hematocrit      35.0 - 47.0 % 40.0   MCV      78 - 100 fL 87   MCH      26.5 - 33.0 pg 28.4   MCHC      31.5 - 36.5 g/dL 32.8   RDW      10.0 - 15.0 % 14.6   Platelet Count      150 - 450 10e3/uL 388     Component      Latest Ref Rng & Units 8/2/2022   Sodium      133 - 144 mmol/L 136   Potassium      3.4 - 5.3 mmol/L 3.3 (L)   Chloride      94 - 109 mmol/L 101   Carbon Dioxide      20 - 32 mmol/L 31   Anion Gap      3 - 14 mmol/L 4   Urea Nitrogen      7 - 30 mg/dL 15   Creatinine      0.52 - 1.04 mg/dL 0.71   Calcium      8.5 - 10.1 mg/dL 8.6   Glucose      70 - 99 mg/dL 110 (H)   GFR Estimate      >60 mL/min/1.73m2 88     Microbiology  7/30 lower back tissue    Back, Lower; Tissue          0 Result Notes    Culture 1+ Proteus mirabilis Abnormal        1+ Proteus mirabilis Abnormal        1+ Corynebacterium striatum Abnormal     Identification obtained by MALDI-TOF mass spectrometry research use only database. Test characteristics determined and verified by the [facility].   Susceptibilities not routinely done   1+ Enterococcus faecalis Abnormal             Resulting Agency: [facility]       Susceptibility     Proteus mirabilis (1) Proteus mirabilis (2)     ANTONIA ANTONIA     Ampicillin <=2 ug/mL Susceptible <=2 ug/mL Susceptible      Ampicillin/ Sulbactam <=2 ug/mL Susceptible <=2 ug/mL Susceptible     Cefepime <=1 ug/mL Susceptible <=1 ug/mL Susceptible     Ceftazidime <=1 ug/mL Susceptible <=1 ug/mL Susceptible     Ceftriaxone <=1 ug/mL Susceptible <=1 ug/mL Susceptible     Ciprofloxacin <=0.25 ug/mL Susceptible <=0.25 ug/mL Susceptible     Gentamicin <=1 ug/mL Susceptible <=1 ug/mL Susceptible     Levofloxacin <=0.12 ug/mL Susceptible <=0.12 ug/mL Susceptible     Meropenem <=0.25 ug/mL Susceptible 0.5 ug/mL Susceptible     Piperacillin/Tazobactam <=4 ug/mL Susceptible <=4 ug/mL Susceptible     Tobramycin <=1 ug/mL Susceptible <=1 ug/mL Susceptible     Trimethoprim/Sulfamethoxazole <=1/19 ug/mL Susceptible <=1/19 ug/mL Susceptible                           7/28 lower back abscess  Back, Lower; Abscess          0 Result Notes    Culture 1+ Proteus mirabilis Abnormal        1+ Staphylococcus aureus Abnormal        1+ Normal garland                Gram Stain Result   Abnormal   2+ Gram negative bacilli      4+ WBC seen   Predominantly PMNs           Resulting Agency: [facility]       Susceptibility     Proteus mirabilis Staphylococcus aureus     ANTONIA ANTONIA     Ampicillin <=2 ug/mL Susceptible       Ampicillin/ Sulbactam <=2 ug/mL Susceptible       Cefepime <=1 ug/mL Susceptible       Ceftazidime <=1 ug/mL Susceptible       Ceftriaxone <=1 ug/mL Susceptible       Ciprofloxacin <=0.25 ug/mL Susceptible       Clindamycin   <=0.25 ug/mL Susceptible     Erythromycin   <=0.25 ug/mL Susceptible     Gentamicin <=1 ug/mL Susceptible <=0.5 ug/mL Susceptible     Levofloxacin <=0.12 ug/mL Susceptible       Meropenem <=0.25 ug/mL Susceptible       Oxacillin   0.5 ug/mL Susceptible 1     Piperacillin/Tazobactam <=4 ug/mL Susceptible       Tetracycline   <=1 ug/mL Susceptible     Tobramycin <=1 ug/mL Susceptible       Trimethoprim/Sulfamethoxazole <=1/19 ug/mL Susceptible <=0.5/9.5 u... Susceptible     Vancomycin   1 ug/mL Susceptible

## 2022-08-02 NOTE — PLAN OF CARE
Goal Outcome Evaluation:          Overall Patient Progress: no change    Outcome Evaluation: Continues to consume </= 75% of meals. Recommend increase Gelatein+ supplement to TID d/t increased protein needs for wound healing. Additionally will add MVI+M for micronutrient needs with wound healing.

## 2022-08-02 NOTE — PROGRESS NOTES
"General Surgery Progress Note    Assessment and Plan:  Patient is a 77 YOF with history of obesity and hypertension who was found to have a CVA  and underwent thrombectomy post op course complicated by right flank pressure wound s/p excisional debridement . Patient recovering appropriately.   - Placed wound vac. Two pieces of black sponge used, set to 125. To be changed T, TH, S  - Rest of cares per primary team    Interval Hx:   Patient states the wound is less painful today. She still feels very anxious    Exam:  Vitals: Blood pressure 132/57, pulse 76, temperature 98.1  F (36.7  C), temperature source Oral, resp. rate 18, height 1.676 m (5' 6\"), weight (!) 159.2 kg (350 lb 14.4 oz), SpO2 91 %, not currently breastfeeding.  Temperature Temp  Av.3  F (36.8  C)  Min: 98  F (36.7  C)  Max: 99  F (37.2  C)   I/O last 3 completed shifts:  In: 370 [P.O.:370]  Out: 950 [Urine:950]    Gen: Awake and in no apparent distress. Mild agitation  Lungs: No increased work in breathing.  Flank:  13x7x3 cm wound with moderate exudate. Black sponge placed              Data:    Recent Labs   Lab Test 22  0747 22  0645 22  0727 22  0717   WBC 8.2 6.8  --  6.0   HGB 13.1 14.4  --  13.2   HCT 40.0 46.4  --  40.9    366 380 369      Christianne Starks MD  9:30 AM      "

## 2022-08-02 NOTE — PROGRESS NOTES
CLINICAL NUTRITION SERVICES - REASSESSMENT NOTE      Recommendations Ordered by Registered Dietitian (RD):   MVI+M daily for micronutrient needs 2/2 wound healing   Increase Gelatein+ to TID with meals for protein push 2/2 wound healing    Future/Additional Recommendations:   PRN anti-emetics with nausea    Malnutrition: (7/27)  % Weight Loss:  None noted - difficult to assess with fluid-status  % Intake:  <75% for > 7 days (moderate malnutrition)  Subcutaneous Fat Loss:  Orbital region mild depletion - visual  Muscle Loss:  Temporal region mild depletion and Clavicle bone region mild depletion - visual  Fluid Retention: trace arm edema and Moderate generalized edema     Malnutrition Diagnosis: Moderate malnutrition  In Context of:  Acute illness or injury  Chronic illness or disease       EVALUATION OF PROGRESS TOWARD GOALS   Diet:  Mechanical/Dental Soft   Room Service w/ Assist     Supplements: Gelatein+ at 2 pm daily     Intake/Tolerance:   Variable intake per chart review, most meals documented at 25-75% intake. Receiving 3 nutritionally adequate meals daily with meal ordering assistance.   Noted patient is alert and oriented to self at present.   Visited with patient at bedside this morning - she was nausea, emesis bag in bed and c/o feeling the need to vomit. Did not discuss nutrition at this time.       ASSESSED NUTRITION NEEDS:  Dosing Weight: 89.7 kg (adjusted)  Estimated Energy Needs: 4691-3143 kcals (15-20 Kcal/Kg)  Justification: maintenance r/t BMI  Estimated Protein Needs: 108-135 grams protein (1.2-1.5 g pro/Kg)  Justification: wound healing, obesity guidelines, preservation of lean body mass and increased needs r/t age  Estimated Fluid Needs: (1 mL/Kcal)  Justification: maintenance and per provider pending fluid status      NEW FINDINGS:   Chart reviewed -   WOCN following for Lower back/R flank wound d/t unstageable hospital acquired PI --> s/p excisional debridement of right flank pressure ulcer  including skin, subcutaneous fat and muscular fascia. Possible wound VAC placement today.     Labs reviewed - K+ 3.3 (L),    Meds - lasix 40 mg in AM, 20 mg in PM, culturell BID   Stool patterns - BM x 1-3 most days     Weight - Variable with use of bed scale and diuresis. Most recent weight equates to BMI 56.64.   Date/Time Weight Weight Method   07/28/22 1508 159.2 kg (350 lb 14.4 oz) Abnormal  Bed scale   07/23/22 1102 174.6 kg (385 lb) Abnormal  --   07/16/22 0500 171.2 kg (377 lb 6.8 oz) Abnormal  Bed scale   07/15/22 0522 170.2 kg (375 lb 3.6 oz) Abnormal  --   07/14/22 0200 170 kg (374 lb 12.5 oz) Abnormal  Bed scale       Previous Goals:   Patient to consume % of nutritionally adequate meals TID with supplements  Evaluation: Not met consistently     Previous Nutrition Diagnosis:   Inadequate oral intake related to related to prolonged admission, altered mental status, reduced appetite, increased protein needs as evidenced by <75% intakes x7 days, moderate malnutrition  Evaluation: No change      CURRENT NUTRITION DIAGNOSIS  Inadequate oral intake related to related to prolonged admission, altered mental status, reduced appetite as evidenced by <75% intakes x20 day admission    Increased nutrient needs (protein-micronutrients) r/t wound healing AEB assessed nutrition needs of > 100 grams protein/day, hospital acquired PI on lower back/R flank     INTERVENTIONS  Recommendations / Nutrition Prescription  MVI+M daily for micronutrient needs 2/2 wound healing   Increase Gelatein+ to TID with meals for protein push 2/2 wound healing   PRN anti-emetics with nausea     Implementation  Medical Food Supplement and Multivitamin/Mineral - ordered as above     Goals  Patient will consume >/= 75% meals + supplements TID       MONITORING AND EVALUATION:  Progress towards goals will be monitored and evaluated per protocol and Practice Guidelines      Thi Conde, IAN, LD

## 2022-08-02 NOTE — PROGRESS NOTES
[facility]    Hospitalist Progress Note    Assessment & Plan   Cristy Bailey is a 77 year old female with a PMHx significant for morbid obesity, hypertension (not on/needing meds), and hyperlipidemia (not on/needing meds), who presented to [facility] 7/13/2022 with complete left-sided paralysis, left-sided facial droop, and difficulty speaking. CTA head remarkable for right MCA occlusion. She was given tenecteplase and subsequently transferred to [facility] 7/13/2022 for thrombectomy.      Acute right MCA ischemic stroke with edema and right to left midline shift  S/p tenecteplase and subsequent R MCA mechanical thrombectomy 7/13/22  * Presented to [facility] 7/13 with complete paralysis, left-sided facial droop, and aphasia. Code stroke initiated. Head CT 7/13 showed signs of an evolving R MCA infarct. CTA head 7/13 showed a right carotid terminus occlusion, right middle cerebral artery M1  segment occlusion with poor opacification of the more distal right MCA branches/poor collateral flow. CTA neck 7/13 negative for acute occlusions. Pt given tenecteplase 7/13 at 13:11. Noted to be agitated and was subsequently intubated given need for procedure.   * Subsequently transferred to [facility] 7/13/2022 where she underwent above procedure.   7/14: Extubated. Head CT 7/14 showed evolution of acute infarct involving the R MCA distribution with increased swelling, cortical effacement, and new mild right-to-left midline shift; questionable hypoattenuation involving the bilateral occipital lobes which could be artifactual.   * Additional stroke workup pursued this stay -- echo 7/14 showed EF 50%, grade 1 diastolic dysfunction   * Started ASA and atorvastatin per stroke team.   * Repeat head CT 7/15 showed evolving R MCA stroke with areas of edema, overall stable.  -- conts on full dose ASA and statin  -- goal SBP <140/90  -- cont telemetry while hospitalized, needs 30d cardiac event  monitor at discharge  -- cont Seroquel 25mg HS and 12.5mg po q6h prn (for agitation/restlessness)  -- cont PT/OT, family previously desired to take her home but now in agreement with placement (needing assist of 2-3 and use of lift this stay)  -- mgmt of dysphagia as below  -- will need to follow up with [facility] in 6-8 wks     Dysphagia due to CVA  * Seen by SLP and noted with dysphagia. Required some intermittent fluid boluses.  -- cont mechanical soft diet per SLP recs    Dyslipidemia  * FLP this stay showed tot cholest 163, HDL 47, LDL 91, .   * Started on atorvastatin 40 mg daily     Volume overload dt diastolic CHF exacerbation: Improved  Essential hypertension  * Not on/needing meds PTA.   * As above, echo this stay showed EF 50% with grade I diastolic dysfunction; RV not well visualized but appeared mild-moderately dilated with global systolic function probably mildly reduced.   * BPs were initially soft this stay and required IVFs.   * BPs improved, ultimately developed pedal edema required IV Lasix   * Started on lisinopril this stay  -- cont lisinopril 10mg daily  -- cont Lasix 40mg po in AM and 20mg po in afternoon    Prolonged QTc   * EKG on 7/13 showed QTc 494.   * Repeat EKG on 7/30 (while on Levaquin) showed QTc table at 475.   -- monitoring on telemetry this stay     Chronic bilateral LE edema with chronic venous stasis dermatitis and chronic skin changes  Hx of LLE cellulitis  * Was hospitalized in 11/2021 for sepsis dt to pneumonia and LLE cellulitis.   * During this stay, BLE noted to be patty and edematous, no unilateral leg swelling appreciated; LLE had patchy areas of erythema, no fluctuance, no painful areas with palpation; legs warm to touch. Lymphedema consulted.   -- cont LE elevation, lymphedema wraps  -- diuresis as above     Lower back/R flank wound, suspect dt pressure type injury, s/p surgical debridement on 7/30/22  OA  Hx of bilateral hip replacements   Hx of chronic neck and back  pain  * Pt with significant back pain early on in hospital stay. Was requiring IV hydromorphone.  Dose had to be decreased dt somnolence.  * MRI L-spine in 2018 showed multilevel degenerative changes with mild central stenosis. Patient has chronic back pain, reports having it over the last 2 years.   * During this stay, patient was noted with 5-6cm by 2-3cm wound with swelling/fluid/blistering in a fold of her lower back, did not appear infected; this seemed to be the source of her pain. Padded dressing placed and WOC RN ordered. Pain initially improved.   * Was placed on course of Augmentin on 7/24.   * On 7/26, was re-evaluated by WOC RN. Wound appeared more erythematous and purulence expressed. Worsening with shear stress from lift. Procal <0.05, WBC WNL. Abx changed to IV clindamycin.  * Wound cultures obtained. On 7/28, wound grew proteus and staph aureus (MRSA neg). US neg for abscess.   * Lost IV access on 7/28 so abx were changed to oral clindamycin and oral levaquin. IV access re-established but was continued on oral abx.  * General surgery consulted, ultimately underwent excisional debridement of R flank abscess in OR on 7/30. Intraop cultures showed polymicrobial growth with proteus mirabilis x2 strains (both pan-sensitive), corynebacterium striatum and enterococcus faecalis.  -- has been on abx since 7/24   -- conts on oral Levaquin and clindamycin (today is d#10 of abx), will ask ID to weigh in on abx recs  -- routine postop cares per gen surgery -- to have wound vac placed 8/2  -- WOC RN following  -- prns available for pain  -- cont regular repositioning     Suspected meningioma left parietal region, incidentally seen on CT  * Head CT 7/13 showed a calcified extra-axial mass overlying the left parietal region measuring 1.4 cm, potentially representing a meningioma.  * Will need serial monitoring OP after discharge.      Anemia, suspect dilutional component  * Hgb normal on admit. Hgb 11.5 on 7/16. No  overt clinical signs of major bleeding.  * Hgb now stable at 12-13  -- monitor labs periodically     Intertriginous dermatitis  * Chronic and stable, cont clotrimazole powder     Constipation  * Continue sched bowel regimen     Hypokalemia  Hypophosphatemia  * Potassium and phosphorus low at times this hospitalization. Treated with replacement.    Morbid obesity  * BMI 59 this stay. Recommend aggressive dietary and lifestyle modifications as condition improves     Moderate malnutrition in context of acute illness and chronic disease  * Nutritionist following. On modified diet as above.     FEN: no IVFs, lytes stable, modified diet as above per SLP  DVT Prophylaxis: Lovenox, PCDs  Code Status: Full Code    Disposition: Discharge date unclear, pending placement.    Darby Martin,     Interval History    Had a better night. Seen this morning. Alert and pleasant. Oriented to self and location, cannot tell me the year. Some R flank pain dt wound. Also reports some heartburn. No cp/sob/cough, abd pain/n/v.     -Data reviewed today: I reviewed all new labs and imaging results over the last 24 hours. I personally reviewed no images or EKG's today.    Physical Exam   Temp: 98.1  F (36.7  C) Temp src: Oral BP: 132/57 Pulse: 76   Resp: 18 SpO2: 91 % O2 Device: None (Room air)    Vitals:    07/16/22 0500 07/23/22 1102 07/28/22 1508   Weight: (!) 171.2 kg (377 lb 6.8 oz) (!) 174.6 kg (385 lb) (!) 159.2 kg (350 lb 14.4 oz)     Vital Signs with Ranges  Temp:  [97.6  F (36.4  C)-98.7  F (37.1  C)] 98.1  F (36.7  C)  Pulse:  [65-84] 76  Resp:  [16-19] 18  BP: ()/(49-66) 132/57  SpO2:  [91 %-93 %] 91 %  I/O last 3 completed shifts:  In: 370 [P.O.:370]  Out: 950 [Urine:950]    Constitutional: Resting comfortably, oriented to self/location but cannot tell me the year, NAD   Respiratory: CTAB, no wheeze/rales/rhonchi, no increased work of breathing  Cardiovascular: HRRR, no MGR, wraps on bilateral LEs  GI: obese, S,  NT, ND, +BS  Skin/Integumen: warm/dry, wound not directly examined by me  Other:      Medications     - MEDICATION INSTRUCTIONS -         acetaminophen  975 mg Oral Q8H     aspirin  325 mg Oral Daily     atorvastatin  40 mg Oral QPM     clindamycin  300 mg Oral Q6H DARWIN     diclofenac  2 g Topical 4x Daily     enoxaparin ANTICOAGULANT  40 mg Subcutaneous Q12H     furosemide  20 mg Oral Daily at 4 pm     furosemide  40 mg Oral Daily     lactobacillus rhamnosus (GG)  1 capsule Oral BID     levofloxacin  750 mg Oral Daily     lisinopril  10 mg Oral Daily     miconazole   Topical BID     polyethylene glycol  17 g Oral Daily     pramoxine   Topical TID     QUEtiapine  25 mg Oral At Bedtime     senna-docusate  1-2 tablet Oral BID       Data   Recent Labs   Lab 08/02/22  0747 07/31/22  0645 07/30/22  1647 07/30/22  0727 07/29/22  1448 07/29/22  0717   WBC 8.2 6.8  --   --   --  6.0   HGB 13.1 14.4  --   --   --  13.2   MCV 87 92  --   --   --  88    366  --  380  --  369    135  --  138  --  138   POTASSIUM 3.3* 3.7 3.8 3.3*   < > 3.1*   CHLORIDE 101 102  --  102  --  101   CO2 31 28  --  29  --  32   BUN 15 11  --  10  --  11   CR 0.71 0.62  --  0.57  --  0.64   ANIONGAP 4 5  --  7  --  5   ASHIA 8.6 8.9  --  8.9  --  8.6   * 97  --  101*  --  100*    < > = values in this interval not displayed.       No results found for this or any previous visit (from the past 24 hour(s)).

## 2022-08-02 NOTE — PLAN OF CARE
Neuro: alert and oriented to self and place  Tele/cardiac: NA  Respiration: on RA  Activity: A3, turn and repo  Pain: back pain tyleno and oxy given  Drips: none  Drains/tubes: none  Skin: pressure injury to back  GI/: incontinent, pure wick in place  Aggression color: green  Isolation: none  Plan: possible wound vac placement today

## 2022-08-03 NOTE — PROGRESS NOTES
"General Surgery Progress Note    Subjective: pt reports pain with position changes and dressing changes.     Objective: BP 99/49   Pulse 80   Temp 97.8  F (36.6  C) (Oral)   Resp 18   Ht 1.676 m (5' 6\")   Wt (!) 159.2 kg (350 lb 14.4 oz)   SpO2 94%   BMI 56.64 kg/m    Gen: alert, no distress  CV: RRR  Pulm: nonlabored breathing  Right flank: wound vac removed, healthy granulation tissue at base without ongoing evidence of infection.   Ext: WWP    Assessment/Plan:   Cristy Bailey  is a 77 year old female with right flank abscess s/p excisional debridement and now in wound vac. Vac changed today as above, see picture from resident note.   - Will ask WOC RN to continue with F wound vac changes going forward. Discussed expectations with patient that this will take a long time to heal and she could follow up either in wound clinic 3-4 weeks after discharge or with me in general surgery clinic (will list options in discharge orders)  - surgery will sign off as the wound is healing well and pt can follow up as above  - please do not hesitate to call with any further questions    Mariah Black MD  [facility]  [phone]              "

## 2022-08-03 NOTE — PROGRESS NOTES
[facility]    Hospitalist Progress Note    Assessment & Plan   Cristy Bailey is a 77 year old female with a PMHx significant for morbid obesity, hypertension (not on/needing meds), and hyperlipidemia (not on/needing meds), who presented to [facility] 7/13/2022 with complete left-sided paralysis, left-sided facial droop, and difficulty speaking. CTA head remarkable for right MCA occlusion. She was given tenecteplase and subsequently transferred to [facility] 7/13/2022 for thrombectomy.      Acute right MCA ischemic stroke with edema and right to left midline shift  S/p tenecteplase and subsequent R MCA mechanical thrombectomy 7/13/22  * Presented to [facility] 7/13 with complete paralysis, left-sided facial droop, and aphasia. Code stroke initiated. Head CT 7/13 showed signs of an evolving R MCA infarct. CTA head 7/13 showed a right carotid terminus occlusion, right middle cerebral artery M1  segment occlusion with poor opacification of the more distal right MCA branches/poor collateral flow. CTA neck 7/13 negative for acute occlusions. Pt given tenecteplase 7/13 at 13:11. Noted to be agitated and was subsequently intubated given need for procedure.   * Subsequently transferred to [facility] 7/13/2022 where she underwent above procedure.   7/14: Extubated. Head CT 7/14 showed evolution of acute infarct involving the R MCA distribution with increased swelling, cortical effacement, and new mild right-to-left midline shift; questionable hypoattenuation involving the bilateral occipital lobes which could be artifactual.   * Additional stroke workup pursued this stay -- echo 7/14 showed EF 50%, grade 1 diastolic dysfunction   * Started ASA and atorvastatin per stroke team.   * Repeat head CT 7/15 showed evolving R MCA stroke with areas of edema, overall stable.  -- conts on full dose ASA and statin  -- goal SBP <140/90  -- cont telemetry while hospitalized, needs 30d cardiac event  monitor at discharge  -- cont Seroquel 25mg HS and 12.5mg po q6h prn (for agitation/restlessness)  -- cont PT/OT, family previously desired to take her home but now in agreement with placement (needing assist of 2-3 and use of lift this stay)  -- mgmt of dysphagia as below  -- will need to follow up with [facility] in 6-8 wks     Dysphagia due to CVA  * Seen by SLP and noted with dysphagia. Required some intermittent fluid boluses.  -- cont mechanical soft diet per SLP recs    Dyslipidemia  * FLP this stay showed tot cholest 163, HDL 47, LDL 91, .   * Started on atorvastatin 40 mg daily     Volume overload dt diastolic CHF exacerbation: Improved  Essential hypertension  * Not on/needing meds PTA.   * As above, echo this stay showed EF 50% with grade I diastolic dysfunction; RV not well visualized but appeared mild-moderately dilated with global systolic function probably mildly reduced.   * BPs were initially soft this stay and required IVFs.   * BPs improved, ultimately developed pedal edema required IV Lasix   * Started on lisinopril this stay  -- cont lisinopril 10mg daily  -- cont Lasix 40mg po in AM and 20mg po in afternoon    Prolonged QTc   * EKG on 7/13 showed QTc 494.   * Repeat EKG on 7/30 (while on Levaquin) showed QTc table at 475.   -- monitoring on telemetry this stay     Chronic bilateral LE edema with chronic venous stasis dermatitis and chronic skin changes  Hx of LLE cellulitis  * Was hospitalized in 11/2021 for sepsis dt to pneumonia and LLE cellulitis.   * During this stay, BLE noted to be patty and edematous, no unilateral leg swelling appreciated; LLE had patchy areas of erythema, no fluctuance, no painful areas with palpation; legs warm to touch. Lymphedema consulted.   -- cont LE elevation, lymphedema wraps  -- diuresis as above     Lower back/R flank wound, suspect dt pressure type injury, s/p surgical debridement on 7/30/22  OA  Hx of bilateral hip replacements   Hx of chronic neck and back  pain  * Pt with significant back pain early on in hospital stay. Was requiring IV hydromorphone.  Dose had to be decreased dt somnolence.  * MRI L-spine in 2018 showed multilevel degenerative changes with mild central stenosis. Patient has chronic back pain, reports having it over the last 2 years.   * During this stay, patient was noted with 5-6cm by 2-3cm wound with swelling/fluid/blistering in a fold of her lower back, did not appear infected; this seemed to be the source of her pain. Padded dressing placed and WOC RN ordered. Pain initially improved.   * Was placed on course of Augmentin on 7/24.   * On 7/26, was re-evaluated by WOC RN. Wound appeared more erythematous and purulence expressed. Worsening with shear stress from lift. Procal <0.05, WBC WNL. Abx changed to IV clindamycin.  * Wound cultures obtained. On 7/28, wound grew proteus and staph aureus (MRSA neg). US neg for abscess.   * Lost IV access on 7/28 so abx were changed to oral clindamycin and oral levaquin. IV access re-established but was continued on oral abx.  * General surgery consulted, ultimately underwent excisional debridement of R flank abscess in OR on 7/30. Intraop cultures showed polymicrobial growth with proteus mirabilis x2 strains (both pan-sensitive), corynebacterium striatum and enterococcus faecalis.  * Wound vac placed per general surgery on 8/2  * ID consulted on 8/2 and abx narrowed to Augmentin alone, recommended 5-7d of treatment  -- cont Augmentin   -- routine postop cares per gen surgery   -- wound vac mgmt per WOC RN  -- prns available for pain  -- cont regular repositioning     Suspected meningioma left parietal region, incidentally seen on CT  * Head CT 7/13 showed a calcified extra-axial mass overlying the left parietal region measuring 1.4 cm, potentially representing a meningioma.  * Will need serial monitoring OP after discharge.      Anemia, suspect dilutional component  * Hgb normal on admit. Hgb 11.5 on 7/16. No  overt clinical signs of major bleeding.  * Hgb now stable at 12-13  -- monitor labs periodically     Intertriginous dermatitis  * Chronic and stable, cont clotrimazole powder     Constipation  * Continue sched bowel regimen     Hypokalemia  Hypophosphatemia  * Potassium and phosphorus low at times this hospitalization. Treated with replacement.    Morbid obesity  * BMI 59 this stay. Recommend aggressive dietary and lifestyle modifications as condition improves     Moderate malnutrition in context of acute illness and chronic disease  * Nutritionist following. On modified diet as above.     FEN: no IVFs, lytes stable, modified diet as above per SLP  DVT Prophylaxis: Lovenox, PCDs  Code Status: Full Code    Disposition: Discharge date unclear, pending placement. SW following.     Darby Martin,     Interval History    Uneventful night. Seen this morning. Alert, oriented to self and year, knows she's in a hospital but thinks we're in [place]. No specific complaints this morning. Pain presently controlled. Wound vac in place. No cp/sob/cough, abd pain/n/v.     -Data reviewed today: I reviewed all new labs and imaging results over the last 24 hours. I personally reviewed no images or EKG's today.    Physical Exam   Temp: 97.8  F (36.6  C) Temp src: Oral BP: 99/49 Pulse: 80   Resp: 18 SpO2: 94 % O2 Device: None (Room air)    Vitals:    07/16/22 0500 07/23/22 1102 07/28/22 1508   Weight: (!) 171.2 kg (377 lb 6.8 oz) (!) 174.6 kg (385 lb) (!) 159.2 kg (350 lb 14.4 oz)     Vital Signs with Ranges  Temp:  [97.6  F (36.4  C)-98.8  F (37.1  C)] 97.8  F (36.6  C)  Pulse:  [78-92] 80  Resp:  [18] 18  BP: ()/(49-60) 99/49  SpO2:  [92 %-99 %] 94 %  I/O last 3 completed shifts:  In: -   Out: 500 [Urine:500]    Constitutional: Resting comfortably, A&Ox3 (though thinks hospital is in [place]), NAD   Respiratory: CTAB, no wheeze/rales/rhonchi, no increased work of breathing  Cardiovascular: HRRR, no MGR, wraps on  bilateral LEs  GI: obese, S, NT, ND, +BS  Skin/Integumen: warm/dry, wound vac in place over R flank  Other:      Medications     - MEDICATION INSTRUCTIONS -         acetaminophen  975 mg Oral Q8H     amoxicillin-clavulanate  1 tablet Oral Q12H [facility] (08/20)     aspirin  325 mg Oral Daily     atorvastatin  40 mg Oral QPM     diclofenac  2 g Topical 4x Daily     enoxaparin ANTICOAGULANT  40 mg Subcutaneous Q12H     furosemide  20 mg Oral Daily at 4 pm     furosemide  40 mg Oral Daily     lactobacillus rhamnosus (GG)  1 capsule Oral BID     lisinopril  10 mg Oral Daily     miconazole   Topical BID     multivitamin, therapeutic  1 tablet Oral Daily     polyethylene glycol  17 g Oral Daily     pramoxine   Topical TID     QUEtiapine  25 mg Oral At Bedtime     senna-docusate  1-2 tablet Oral BID       Data   Recent Labs   Lab 08/02/22  1607 08/02/22  0747 07/31/22  0645 07/30/22  1647 07/30/22  0727 07/29/22  1448 07/29/22  0717   WBC  --  8.2 6.8  --   --   --  6.0   HGB  --  13.1 14.4  --   --   --  13.2   MCV  --  87 92  --   --   --  88   PLT  --  388 366  --  380  --  369   NA  --  136 135  --  138  --  138   POTASSIUM 3.5 3.3* 3.7   < > 3.3*   < > 3.1*   CHLORIDE  --  101 102  --  102  --  101   CO2  --  31 28  --  29  --  32   BUN  --  15 11  --  10  --  11   CR  --  0.71 0.62  --  0.57  --  0.64   ANIONGAP  --  4 5  --  7  --  5   ASHIA  --  8.6 8.9  --  8.9  --  8.6   GLC  --  110* 97  --  101*  --  100*    < > = values in this interval not displayed.       No results found for this or any previous visit (from the past 24 hour(s)).

## 2022-08-03 NOTE — PROGRESS NOTES
"Notified Person Name: Veronica STAUFFER MD     Notification Date/Time: 8/3/22     Purpose of Notification or Copy of Page: soft blood pressures     Comments: \" Patient continues to have soft b/ps and lisinopril was held this am \"    "

## 2022-08-03 NOTE — PLAN OF CARE
Shift Note [id number]:   Patient admitted to unit for CVA. Patient is alert and oriented x1-disoriented to situation, time and place. Assist x2-3 lift. VS-soft b/p's. MD updated, medications held. Patient currently asymptomatic, if become symptomatic MD wants update. C/o mild/severe pain, scheduled and prn medications administered, ice applied, repo attempted/ completed. Aggression Stop Light green/yellow.     Patient refused edema wear being placed back on legs post skin assessment. Patient consistently stating she is unable to complete simple tasks, encouraged to try to complete on her own to maintain independence. Consistently refuses/ will not try.    Major Shift Events: ABX changed

## 2022-08-03 NOTE — PLAN OF CARE
Date & Time: 8/3 5174-1352  Diagnosis: CVA s/p TNK  Procedures: 7/13 - thrombectomy, 8/2 - wound vac placement  Orientation/Cognitive: AOx2, very anxious/fearful  VS/O2: VSS, RA  Mobility: A2-3 + lift, occasionally refuses turn/repo  Diet: Soft & bite size   Pain Management: PRN atarax, oxycodone, scheduled tylenol  Bowel & Bladder: Incontinent, Purewick patent, BMx1  Skin: Scattered weeping wounds BLE - JH, L back wound - CDI/wound care done, open areas under panus - wound care done, lymphedema BLE - refused wraps    Abnormal Labs: NA  Tele: NA  IV Access/Drips/Fluids: R PIV SL   Drains: Purewick   Tests: CT/MRI - R MCA, back wound - gram +, abd US - negative, ECHO - EF 50%  Consults: Neurology, general surgery, hospitalist   Discharge Plan: Pending - placement   Other: Neuros - LUE weak, BLE weak

## 2022-08-03 NOTE — PROCEDURES
Wound Vac Change    Prior wound vac removed. New piece of halved black sponge placed roughly 14x7. Additional piece of black foam used as bridge kim pad laterally to avoid pressure wound from tubing. Suction set to -125. To be changed MWF by WO.         Christianne Starks MD

## 2022-08-03 NOTE — PROGRESS NOTES
[facility]    Infectious Disease Progress Note    Date of Service: 08/03/2022     Assessment:   1. Right flank/back wound due to pressure injury with infection. S/p surgical debridement with polymicrobial cx-MSSA, proteus mirabilis and E.fecalis. Wound vac was changed, wound appears healthy  2. Recent R MCA CVA s/p thrombolytic therapy and mechanical thrombectomy on 7/13 with resolution of neurological deficits  3. LE lymphedema  4. Chronic medical conditions - Obesity, diastolic CHF, HTN, venous stasis with stasis dermatitis, bilateral hip replacement     Recommendations:  1. Treat with Augmentin for 5 days, then discontinue antibiotics  2. Local wound care    No additional recommendations, ID will sign off    Mere Demarco MD    Interval History   Resting, no new complaints, wound vac was changed, wound appeared healthy, tolerating antibiotics without side effects    Physical Exam   Temp: 98.4  F (36.9  C) Temp src: Oral BP: (!) 88/46 Pulse: 78   Resp: 18 SpO2: 91 % O2 Device: None (Room air)    Vitals:    07/16/22 0500 07/23/22 1102 07/28/22 1508   Weight: (!) 171.2 kg (377 lb 6.8 oz) (!) 174.6 kg (385 lb) (!) 159.2 kg (350 lb 14.4 oz)     Vital Signs with Ranges  Temp:  [97.6  F (36.4  C)-98.8  F (37.1  C)] 98.4  F (36.9  C)  Pulse:  [78-92] 78  Resp:  [18] 18  BP: ()/(46-60) 88/46  SpO2:  [91 %-99 %] 91 %    GENERAL APPEARANCE:  awake  EYES: Eyes grossly normal to inspection  NECK: symmetric  RESP: non labored breathing  ABDOMEN: obese, right flank wound vac  MS: extensive  Lymphedema and stasis changes    Other:    Medications     - MEDICATION INSTRUCTIONS -         acetaminophen  975 mg Oral Q8H     amoxicillin-clavulanate  1 tablet Oral Q12H [facility] (08/20)     aspirin  325 mg Oral Daily     atorvastatin  40 mg Oral QPM     diclofenac  2 g Topical 4x Daily     enoxaparin ANTICOAGULANT  40 mg Subcutaneous Q12H     furosemide  20 mg Oral Daily at 4 pm     furosemide  40 mg Oral Daily      lactobacillus rhamnosus (GG)  1 capsule Oral BID     lisinopril  10 mg Oral Daily     miconazole   Topical BID     multivitamin, therapeutic  1 tablet Oral Daily     polyethylene glycol  17 g Oral Daily     pramoxine   Topical TID     QUEtiapine  25 mg Oral At Bedtime     senna-docusate  1-2 tablet Oral BID       Data   All microbiology laboratory data reviewed.  Recent Labs   Lab Test 08/02/22  0747 07/31/22  0645 07/30/22  0727 07/29/22  0717   WBC 8.2 6.8  --  6.0   HGB 13.1 14.4  --  13.2   HCT 40.0 46.4  --  40.9   MCV 87 92  --  88    366 380 369     Recent Labs   Lab Test 08/02/22  0747 07/31/22  0645 07/30/22  0727   CR 0.71 0.62 0.57     Recent Labs   Lab Test 08/31/18  1649   SED 38*     Microbiology  7/30 lower back tissue     Back, Lower; Tissue            0 Result Notes     Culture 1+ Proteus mirabilis Abnormal         1+ Proteus mirabilis Abnormal         1+ Corynebacterium striatum Abnormal     Identification obtained by MALDI-TOF mass spectrometry research use only database. Test characteristics determined and verified by the [facility] Diagnostic [facility].   Susceptibilities not routinely done   1+ Enterococcus faecalis Abnormal               Resulting Agency: [facility]         Susceptibility                Proteus mirabilis (1) Proteus mirabilis (2)       ANTONAI ANTONIA       Ampicillin <=2 ug/mL Susceptible <=2 ug/mL Susceptible       Ampicillin/ Sulbactam <=2 ug/mL Susceptible <=2 ug/mL Susceptible       Cefepime <=1 ug/mL Susceptible <=1 ug/mL Susceptible       Ceftazidime <=1 ug/mL Susceptible <=1 ug/mL Susceptible       Ceftriaxone <=1 ug/mL Susceptible <=1 ug/mL Susceptible       Ciprofloxacin <=0.25 ug/mL Susceptible <=0.25 ug/mL Susceptible       Gentamicin <=1 ug/mL Susceptible <=1 ug/mL Susceptible       Levofloxacin <=0.12 ug/mL Susceptible <=0.12 ug/mL Susceptible       Meropenem <=0.25 ug/mL Susceptible 0.5 ug/mL Susceptible       Piperacillin/Tazobactam <=4 ug/mL Susceptible  <=4 ug/mL Susceptible       Tobramycin <=1 ug/mL Susceptible <=1 ug/mL Susceptible       Trimethoprim/Sulfamethoxazole <=1/19 ug/mL Susceptible <=1/19 ug/mL Susceptible                                     7/28 lower back abscess  Back, Lower; Abscess            0 Result Notes     Culture 1+ Proteus mirabilis Abnormal         1+ Staphylococcus aureus Abnormal         1+ Normal garland                  Gram Stain Result   Abnormal   2+ Gram negative bacilli       4+ WBC seen   Predominantly PMNs            Resulting Agency: [facility]         Susceptibility                   Proteus mirabilis Staphylococcus aureus             ANTONIA ANTONIA             Ampicillin <=2 ug/mL Susceptible           Ampicillin/ Sulbactam <=2 ug/mL Susceptible           Cefepime <=1 ug/mL Susceptible           Ceftazidime <=1 ug/mL Susceptible           Ceftriaxone <=1 ug/mL Susceptible           Ciprofloxacin <=0.25 ug/mL Susceptible           Clindamycin     <=0.25 ug/mL Susceptible       Erythromycin     <=0.25 ug/mL Susceptible       Gentamicin <=1 ug/mL Susceptible <=0.5 ug/mL Susceptible       Levofloxacin <=0.12 ug/mL Susceptible           Meropenem <=0.25 ug/mL Susceptible           Oxacillin     0.5 ug/mL Susceptible 1       Piperacillin/Tazobactam <=4 ug/mL Susceptible           Tetracycline     <=1 ug/mL Susceptible       Tobramycin <=1 ug/mL Susceptible           Trimethoprim/Sulfamethoxazole <=1/19 ug/mL Susceptible <=0.5/9.5 u... Susceptible       Vancomycin     1 ug/mL Susceptible

## 2022-08-03 NOTE — PLAN OF CARE
Goal Outcome Evaluation:    Plan of Care Reviewed With: patient     Overall Patient Progress: improving    Admit 7/13 with weakness. Moves all extremities, BUE 5/5, BLE 3/5. Back pain managed with oxycodone, atarax and tylenol. Wound vac in today, no issues. Turn and repo as patient permits. Purewick in place for incontinence. Repositioned in bed with assist 2, lift device. Plan for discharge to LTC when placement found.

## 2022-08-04 NOTE — PROGRESS NOTES
Care Management Follow Up    Length of Stay (days): 22    Expected Discharge Date: 08/04/2022     Concerns to be Addressed:       Patient plan of care discussed at interdisciplinary rounds: Yes    Anticipated Discharge Disposition: Home     Anticipated Discharge Services:    Anticipated Discharge DME:      Patient/family educated on Medicare website which has current facility and service quality ratings:    Education Provided on the Discharge Plan:    Patient/Family in Agreement with the Plan:      Referrals Placed by CM/SW:    Private pay costs discussed: Not applicable    Additional Information:  [facility]    Writer reached out to financial counselor regarding status of MA application. Gabe Figueroa  reports he had sent application to previous social and that it needs to be signed by the appropriate part (FC unsure if patient is able to or who would be appropriate to do so). Writer confirmed with  supervisor (Liz Francis) that because patient and significant other are not legally , it would be patient's next of kin, being her children. Writer updated Gabe on this who helped complete application, and writer requested he reach out to next of kin to get signature as he will send into [facility].     Will continue to follow.    FADUMO Juarez, LGSW    [facility]

## 2022-08-04 NOTE — PROGRESS NOTES
[facility]    Hospitalist Progress Note      Assessment & Plan   Cristy Bailey is a 77 year old female with a PMHx significant for morbid obesity, hypertension (not on/needing meds), and hyperlipidemia (not on/needing meds), who presented to [facility] 7/13/2022 with complete left-sided paralysis, left-sided facial droop, and difficulty speaking. CTA head remarkable for right MCA occlusion. She was given tenecteplase and subsequently transferred to [facility] 7/13/2022 for thrombectomy.     Acute right MCA ischemic stroke with edema and right to left midline shift  S/p tenecteplase and subsequent R MCA mechanical thrombectomy 7/13/22  * Presented to [facility] 7/13 with complete paralysis, left-sided facial droop, and aphasia. Code stroke initiated. Head CT 7/13 showed signs of an evolving R MCA infarct. CTA head 7/13 showed a right carotid terminus occlusion, right middle cerebral artery M1  segment occlusion with poor opacification of the more distal right MCA branches/poor collateral flow. CTA neck 7/13 negative for acute occlusions. Pt given tenecteplase 7/13 at 13:11. Noted to be agitated and was subsequently intubated given need for procedure.   * Subsequently transferred to [facility] 7/13/2022 where she underwent above procedure.   7/14: Extubated. Head CT 7/14 showed evolution of acute infarct involving the R MCA distribution with increased swelling, cortical effacement, and new mild right-to-left midline shift; questionable hypoattenuation involving the bilateral occipital lobes which could be artifactual.   * Additional stroke workup pursued this stay -- echo 7/14 showed EF 50%, grade 1 diastolic dysfunction   * Started ASA and atorvastatin per stroke team.   * Repeat head CT 7/15 showed evolving R MCA stroke with areas of edema, overall stable.  -- conts on full dose ASA and statin  -- goal SBP <140/90  -- 30d cardiac event monitor at discharge  -- cont Seroquel 25mg  HS and 12.5mg po q6h prn (for agitation/restlessness)  -- cont PT/OT, family previously desired to take her home but now in agreement with placement (needing assist of 2-3 and use of lift this stay)  -- mgmt of dysphagia as below  -- will need to follow up with [facility] in 6-8 wks (due beginning of September)     Dysphagia due to CVA  * Seen by SLP and noted with dysphagia. Required some intermittent fluid boluses.  -- cont mechanical soft diet per SLP recs     Dyslipidemia  * FLP this stay showed tot cholest 163, HDL 47, LDL 91, .   * Started on atorvastatin 40 mg daily     Volume overload dt diastolic CHF exacerbation: Improved  Essential hypertension  * Not on/needing meds PTA.   * As above, echo this stay showed EF 50% with grade I diastolic dysfunction; RV not well visualized but appeared mild-moderately dilated with global systolic function probably mildly reduced.   * BPs were initially soft this stay and required IVFs.   * BPs improved, ultimately developed pedal edema required IV Lasix   * Started on lisinopril this stay  -- cont lisinopril 10mg daily  -- cont Lasix 20mg po in afternoon (40mg PO lasix in AM held while monitoring BPs --had soft BP on 8/3))     Prolonged QTc   * EKG on 7/13 showed QTc 494.   * Repeat EKG on 7/30 (while on Levaquin) showed QTc table at 475.   -- monitoring on telemetry this stay     Chronic bilateral LE edema with chronic venous stasis dermatitis and chronic skin changes  Hx of LLE cellulitis  * Was hospitalized in 11/2021 for sepsis dt to pneumonia and LLE cellulitis.   * During this stay, BLE noted to be patty and edematous, no unilateral leg swelling appreciated; LLE had patchy areas of erythema, no fluctuance, no painful areas with palpation; legs warm to touch. Lymphedema consulted.   -- cont LE elevation, lymphedema wraps  -- diuresis as above     Lower back/R flank wound/abscess, suspect dt pressure type injury, s/p surgical debridement on 7/30/22  OA  Hx of bilateral  hip replacements   Hx of chronic neck and back pain  * Pt with significant back pain early on in hospital stay. Was requiring IV hydromorphone.  Dose had to be decreased dt somnolence.  * MRI L-spine in 2018 showed multilevel degenerative changes with mild central stenosis. Patient has chronic back pain, reports having it over the last 2 years.   * During this stay, patient was noted with 5-6cm by 2-3cm wound with swelling/fluid/blistering in a fold of her lower back, did not appear infected; this seemed to be the source of her pain. Padded dressing placed and WOC RN ordered. Pain initially improved.   * Was placed on course of Augmentin on 7/24.   * On 7/26, was re-evaluated by WOC RN. Wound appeared more erythematous and purulence expressed. Worsening with shear stress from lift. Procal <0.05, WBC WNL. Abx changed to IV clindamycin.  * Wound cultures obtained. On 7/28, wound grew proteus and staph aureus (MRSA neg). US neg for abscess.   * Lost IV access on 7/28 so abx were changed to oral clindamycin and oral levaquin. IV access re-established but was continued on oral abx.  * General surgery consulted, ultimately underwent excisional debridement of R flank abscess in OR on 7/30. Intraop cultures showed polymicrobial growth with proteus mirabilis x2 strains (both pan-sensitive), corynebacterium striatum and enterococcus faecalis.  * Wound vac placed per general surgery on 8/2  * ID consulted on 8/2 and abx narrowed to Augmentin alone, recommended 5-7d of treatment  -- cont Augmentin for 5 days total.  -- routine postop cares per gen surgery   -- wound vac mgmt per WOC RN, MWF changes  -- prns available for pain  -- cont regular repositioning     Suspected meningioma left parietal region, incidentally seen on CT  * Head CT 7/13 showed a calcified extra-axial mass overlying the left parietal region measuring 1.4 cm, potentially representing a meningioma.  * Will need serial monitoring OP after discharge.       Anemia, suspect dilutional component  * Hgb normal on admit. Hgb 11.5 on 7/16. No overt clinical signs of major bleeding.  * Hgb now stable at 12-13  -- monitor labs periodically     Intertriginous dermatitis  * Chronic and stable, cont clotrimazole powder     Constipation  * Continue sched bowel regimen     Hypokalemia  Hypophosphatemia  * Potassium and phosphorus low at times this hospitalization. Treated with replacement.     Morbid obesity  * BMI 59 this stay. Recommend aggressive dietary and lifestyle modifications as condition improves     Moderate malnutrition in context of acute illness and chronic disease  * Nutritionist following. On modified diet as above.     Clinically Significant Risk Factors Present on Admission                        DVT Prophylaxis: Enoxaparin (Lovenox) SQ  Code Status: Full Code    Expected Discharge Date: 08/04/2022    Discharge Delays: Placement - LTC  Financial counseling needed    Discharge Comments: LTC  Possible wound vac placement needed       Melissa Howe DO  Hospitalist Service  [facility]  Securely message with the Vocera Web Console (learn more here)  Text Page (7am - 6pm) via [facility] Paging/Directory      Interval History   Patient seen and examined. She doesn't want breakfast this morning. Agreeable to ice chips, then changed her mind when I brought them in. No chest pain, shortness of breath. Does have pain in flank where wound is located, but tolerable right now. Discussed with RN. Will attempt to restart some of her blood pressure medications/diuretic today. Had not been on telemetry last week, added back (order noted was actually her discharge cardiac monitor order)    -Data reviewed today: I reviewed all new labs and imaging results over the last 24 hours. I personally reviewed no images or EKG's today.    Physical Exam   Temp: 97.8  F (36.6  C) Temp src: Oral BP: 127/57 Pulse: 78   Resp: 18 SpO2: 92 % O2 Device: None (Room air)     Vitals:    07/16/22 0500 07/23/22 1102 07/28/22 1508   Weight: (!) 171.2 kg (377 lb 6.8 oz) (!) 174.6 kg (385 lb) (!) 159.2 kg (350 lb 14.4 oz)     Vital Signs with Ranges  Temp:  [97.6  F (36.4  C)-98.4  F (36.9  C)] 97.8  F (36.6  C)  Pulse:  [74-78] 78  Resp:  [18] 18  BP: ()/(46-67) 127/57  SpO2:  [91 %-92 %] 92 %  I/O last 3 completed shifts:  In: -   Out: 400 [Urine:400]    Constitutional: Awake, alert, cooperative, no apparent distress  Respiratory: Clear to auscultation bilaterally, no crackles or wheezing  Cardiovascular: Regular rate and rhythm, normal S1 and S2, and no murmur noted  GI: Normal bowel sounds, soft, non-distended, non-tender  Skin/Integumen: No rashes, no cyanosis, +1 edema, dry skin with venous stasis changes (wraps not in place)  Other: Oriented to self, hospital. Wound vac right flank present    Medications     - MEDICATION INSTRUCTIONS -         acetaminophen  975 mg Oral Q8H     amoxicillin-clavulanate  1 tablet Oral Q12H [facility] (08/20)     aspirin  325 mg Oral Daily     atorvastatin  40 mg Oral QPM     diclofenac  2 g Topical 4x Daily     enoxaparin ANTICOAGULANT  40 mg Subcutaneous Q12H     furosemide  20 mg Oral Daily at 4 pm     [Held by provider] furosemide  40 mg Oral Daily     lactobacillus rhamnosus (GG)  1 capsule Oral BID     lisinopril  10 mg Oral Daily     miconazole   Topical BID     multivitamin, therapeutic  1 tablet Oral Daily     polyethylene glycol  17 g Oral Daily     potassium chloride  40 mEq Oral or Feeding Tube Once     pramoxine   Topical TID     QUEtiapine  25 mg Oral At Bedtime     senna-docusate  1-2 tablet Oral BID       Data   Recent Labs   Lab 08/04/22  0709 08/03/22  0921 08/02/22  1607 08/02/22  0747 07/31/22  0645   WBC 7.1  --   --  8.2 6.8   HGB 12.7  --   --  13.1 14.4   MCV 88  --   --  87 92     --   --  388 366     --   --  136 135   POTASSIUM 3.3* 3.6 3.5 3.3* 3.7   CHLORIDE 102  --   --  101 102   CO2 33*  --   --  31 28   BUN  18  --   --  15 11   CR 0.67  --   --  0.71 0.62   ANIONGAP 5  --   --  4 5   ASHIA 8.6  --   --  8.6 8.9   *  --   --  110* 97       No results found for this or any previous visit (from the past 24 hour(s)).

## 2022-08-04 NOTE — PLAN OF CARE
Goal Outcome Evaluation:        Patient refusing some cares. Encouraged ice packs, medication, and repositioning to manage pain with only some compliance from patient.

## 2022-08-04 NOTE — PLAN OF CARE
Reason for Admission: R MCA CVA s/p thrombectomy    Cognitive/Mentation: A/Ox 2 d/o time and situation  Neuros/CMS: Intact ex BLE 3/5  VS: stable.   GI: BS active, + flatus, last BM 8/4. InContinent.  : voiding. InContinent.  Pulmonary: LS clear.  Pain: moderate. Gave tylenol, oxycodone.     Drains/Lines: PIV  Skin: intact ex R flank wound vac  Activity: Assist x 2-3 with lift.  Diet: Soft and bite size with thin liquids. Takes pills crushed in applesauce.     Therapies recs: TCU  Discharge: pending placement    Aggression Stoplight Tool: green/yellow at times    End of shift summary: Patient stable throughout shift.

## 2022-08-04 NOTE — PROGRESS NOTES
8/3 [id number]  Pt is A&Ox2, anxious/fearful, very forgetful. PRN pain medications provided as available. Pt sometimes refuses cares (turns and medications) Pt says meds are hard for her to swallow, even after crushing some and putting them in applesauce.   Wound vac in place, rt back, under skin fold. Receiving abx for wounds. Scattered weeping wounds  Pure wick in place, incontinent of B&B.  Pt had a BM during the day and one during shift, senna was held.

## 2022-08-05 NOTE — PLAN OF CARE
Oriented to self and place, forgetful.  Has a hard time expressing what she needs.  VSS on RA. Tele: SR with occasional PVCs. Up with Ax2-3, Lift device.  Refuses turns, needs help with repo. Soft diet, tolerating well. R flank wound, wound vac in place. Lungs clear. Shins scaly with scattered scabs. Pain managed with PRN oxycodone and ice pack. Purewick in place, -BM, +flatus, +bowel sounds. Continue plan of care.

## 2022-08-05 NOTE — PROGRESS NOTES
Care Management Follow Up    Length of Stay (days): 23    Expected Discharge Date: 08/10/2022     Concerns to be Addressed:       Patient plan of care discussed at interdisciplinary rounds: Yes    Anticipated Discharge Disposition: Home     Anticipated Discharge Services:    Anticipated Discharge DME:      Patient/family educated on Medicare website which has current facility and service quality ratings:    Education Provided on the Discharge Plan:    Patient/Family in Agreement with the Plan:      Referrals Placed by CM/SW:    Private pay costs discussed: Not applicable    Additional Information:  Writer received call from [facility] stating they will attempt to review patient today to see if she would be appropriate for LTC.     Will continue to follow.    FADUMO Juarez, LGSW    [facility]

## 2022-08-05 NOTE — PROGRESS NOTES
[facility]    Hospitalist Progress Note      Assessment & Plan   Cristy Bailey is a 77 year old female with a PMHx significant for morbid obesity, hypertension (not on/needing meds), and hyperlipidemia (not on/needing meds), who presented to [facility] 7/13/2022 with complete left-sided paralysis, left-sided facial droop, and difficulty speaking. CTA head remarkable for right MCA occlusion. She was given tenecteplase and subsequently transferred to [facility] 7/13/2022 for thrombectomy.     Acute right MCA ischemic stroke with edema and right to left midline shift  S/p tenecteplase and subsequent R MCA mechanical thrombectomy 7/13/22  * Presented to [facility] 7/13 with complete paralysis, left-sided facial droop, and aphasia. Code stroke initiated. Head CT 7/13 showed signs of an evolving R MCA infarct. CTA head 7/13 showed a right carotid terminus occlusion, right middle cerebral artery M1  segment occlusion with poor opacification of the more distal right MCA branches/poor collateral flow. CTA neck 7/13 negative for acute occlusions. Pt given tenecteplase 7/13 at 13:11. Noted to be agitated and was subsequently intubated given need for procedure.   * Subsequently transferred to [facility] 7/13/2022 where she underwent above procedure.   7/14: Extubated. Head CT 7/14 showed evolution of acute infarct involving the R MCA distribution with increased swelling, cortical effacement, and new mild right-to-left midline shift; questionable hypoattenuation involving the bilateral occipital lobes which could be artifactual.   * Additional stroke workup pursued this stay -- echo 7/14 showed EF 50%, grade 1 diastolic dysfunction   * Started ASA and atorvastatin per stroke team.   * Repeat head CT 7/15 showed evolving R MCA stroke with areas of edema, overall stable.  -- conts on full dose ASA and statin  -- goal SBP <140/90  -- 30d cardiac event monitor at discharge  -- cont Seroquel 25mg  HS and 12.5mg po q6h prn (for agitation/restlessness)  -- cont PT/OT, family previously desired to take her home but now in agreement with placement (needing assist of 2-3 and use of lift this stay)  -- mgmt of dysphagia as below  -- will need to follow up with [facility]CN in 6-8 wks (due beginning of September)     Dysphagia due to CVA  * Seen by SLP and noted with dysphagia. Required some intermittent fluid boluses.  -- cont mechanical soft diet per SLP recs     Dyslipidemia  * FLP this stay showed tot cholest 163, HDL 47, LDL 91, .   * Started on atorvastatin 40 mg daily     Volume overload dt diastolic CHF exacerbation: Improved  Essential hypertension  * Not on/needing meds PTA.   * As above, echo this stay showed EF 50% with grade I diastolic dysfunction; RV not well visualized but appeared mild-moderately dilated with global systolic function probably mildly reduced.   * BPs were initially soft this stay and required IVFs.   * BPs improved, ultimately developed pedal edema required IV Lasix   * Started on lisinopril this stay  -- cont lisinopril 10mg daily  -- cont Lasix 20mg po in afternoon and restarted 40mg PO lasix in AM 8/5    Prolonged QTc   * EKG on 7/13 showed QTc 494.   * Repeat EKG on 7/30 (while on Levaquin) showed QTc table at 475.   -- monitoring on telemetry this stay     Chronic bilateral LE edema with chronic venous stasis dermatitis and chronic skin changes  Hx of LLE cellulitis  * Was hospitalized in 11/2021 for sepsis dt to pneumonia and LLE cellulitis.   * During this stay, BLE noted to be patty and edematous, no unilateral leg swelling appreciated; LLE had patchy areas of erythema, no fluctuance, no painful areas with palpation; legs warm to touch. Lymphedema consulted.   -- cont LE elevation, lymphedema wraps  -- diuresis as above     Lower back/R flank wound/abscess, suspect dt pressure type injury, s/p surgical debridement on 7/30/22  OA  Hx of bilateral hip replacements   Hx of chronic  neck and back pain  * Pt with significant back pain early on in hospital stay. Was requiring IV hydromorphone.  Dose had to be decreased dt somnolence.  * MRI L-spine in 2018 showed multilevel degenerative changes with mild central stenosis. Patient has chronic back pain, reports having it over the last 2 years.   * During this stay, patient was noted with 5-6cm by 2-3cm wound with swelling/fluid/blistering in a fold of her lower back, did not appear infected; this seemed to be the source of her pain. Padded dressing placed and WOC RN ordered. Pain initially improved.   * Was placed on course of Augmentin on 7/24.   * On 7/26, was re-evaluated by WOC RN. Wound appeared more erythematous and purulence expressed. Worsening with shear stress from lift. Procal <0.05, WBC WNL. Abx changed to IV clindamycin.  * Wound cultures obtained. On 7/28, wound grew proteus and staph aureus (MRSA neg). US neg for abscess.   * Lost IV access on 7/28 so abx were changed to oral clindamycin and oral levaquin. IV access re-established but was continued on oral abx.  * General surgery consulted, ultimately underwent excisional debridement of R flank abscess in OR on 7/30. Intraop cultures showed polymicrobial growth with proteus mirabilis x2 strains (both pan-sensitive), corynebacterium striatum and enterococcus faecalis.  * Wound vac placed per general surgery on 8/2  * ID consulted on 8/2 and abx narrowed to Augmentin alone, recommended 5-7d of treatment  -- cont Augmentin for 5 days total.  -- routine postop cares per gen surgery   -- wound vac mgmt per WOC RN, MWF changes  -- prns available for pain  -- cont regular repositioning     Suspected meningioma left parietal region, incidentally seen on CT  * Head CT 7/13 showed a calcified extra-axial mass overlying the left parietal region measuring 1.4 cm, potentially representing a meningioma.  * Will need serial monitoring OP after discharge.      Anemia, suspect dilutional  component  * Hgb normal on admit. Hgb 11.5 on 7/16. No overt clinical signs of major bleeding.  * Hgb now stable at 12-13  -- monitor labs periodically     Intertriginous dermatitis  * Chronic and stable, cont clotrimazole powder     Constipation  * Continue sched bowel regimen     Hypokalemia  Hypophosphatemia  * Potassium and phosphorus low at times this hospitalization. Treated with replacement.     Morbid obesity  * BMI 59 this stay. Recommend aggressive dietary and lifestyle modifications as condition improves     Moderate malnutrition in context of acute illness and chronic disease  * Nutritionist following. On modified diet as above.     Clinically Significant Risk Factors Present on Admission                        DVT Prophylaxis: Enoxaparin (Lovenox) SQ  Code Status: Full Code     Expected Discharge Date: 08/10/2022    Discharge Delays: Placement - LTC  Financial counseling needed    Discharge Comments: LTC  Possible wound vac placement needed       Melissa Howe DO  Hospitalist Service  [facility]  Securely message with the Vocera Web Console (learn more here)  Text Page (7am - 6pm) via [facility] Paging/Directory      Interval History   Patient seen and examined. She pulled out her IV this morning. Was yelling out this morning, but not due to pain. She requests repositioning. Reports back pain, no chest pain or shortness of breath. Discussed with RN, will restart lasix this morning, plan to get new IV and try to weigh patient.  Spent 35 minutes with >50% time spent evaluating patient, reviewing chart, discussing care plan with RN. Updating daughter Chelsea by phone x23 minutes.    -Data reviewed today: I reviewed all new labs and imaging results over the last 24 hours. I personally reviewed no images or EKG's today.    Physical Exam   Temp: 97.6  F (36.4  C) Temp src: Oral BP: 121/62 Pulse: 71   Resp: 18 SpO2: 94 % O2 Device: None (Room air)    Vitals:    07/16/22 0500 07/23/22 1102  07/28/22 1508   Weight: (!) 171.2 kg (377 lb 6.8 oz) (!) 174.6 kg (385 lb) (!) 159.2 kg (350 lb 14.4 oz)     Vital Signs with Ranges  Temp:  [97.6  F (36.4  C)-97.7  F (36.5  C)] 97.6  F (36.4  C)  Pulse:  [71] 71  Resp:  [18] 18  BP: (120-121)/(62-65) 121/62  SpO2:  [94 %] 94 %  I/O last 3 completed shifts:  In: 120 [P.O.:120]  Out: -     Constitutional: Easily awakens, cooperative, no apparent distress  Respiratory: Clear to auscultation bilaterally, no crackles or wheezing  Cardiovascular: Regular rate and rhythm, normal S1 and S2, and no murmur noted  GI: Normal bowel sounds, soft, non-distended, non-tender  Skin/Integumen: No rashes, no cyanosis, +1 edema, dry skin with venous stasis changes (wraps not in place)  Other: Oriented to self, hospital. Wound vac right flank present    Medications     - MEDICATION INSTRUCTIONS -         acetaminophen  975 mg Oral Q8H     amoxicillin-clavulanate  1 tablet Oral Q12H [facility] (08/20)     aspirin  325 mg Oral Daily     atorvastatin  40 mg Oral QPM     diclofenac  2 g Topical 4x Daily     enoxaparin ANTICOAGULANT  40 mg Subcutaneous Q12H     furosemide  20 mg Oral Daily at 4 pm     furosemide  40 mg Oral Daily     lactobacillus rhamnosus (GG)  1 capsule Oral BID     lisinopril  10 mg Oral Daily     miconazole   Topical BID     multivitamin, therapeutic  1 tablet Oral Daily     polyethylene glycol  17 g Oral Daily     potassium chloride  40 mEq Oral or Feeding Tube Once     pramoxine   Topical TID     QUEtiapine  25 mg Oral At Bedtime     senna-docusate  1-2 tablet Oral BID       Data   Recent Labs   Lab 08/05/22  0748 08/04/22  1709 08/04/22  0709 08/03/22  0921 08/02/22  1607 08/02/22  0747 07/31/22  0645   WBC  --   --  7.1  --   --  8.2 6.8   HGB  --   --  12.7  --   --  13.1 14.4   MCV  --   --  88  --   --  87 92     --  396  --   --  388 366   NA  --   --  140  --   --  136 135   POTASSIUM  --  3.9 3.3* 3.6   < > 3.3* 3.7   CHLORIDE  --   --  102  --   --  101 102    CO2  --   --  33*  --   --  31 28   BUN  --   --  18  --   --  15 11   CR 0.62  --  0.67  --   --  0.71 0.62   ANIONGAP  --   --  5  --   --  4 5   ASHIA  --   --  8.6  --   --  8.6 8.9   GLC  --   --  104*  --   --  110* 97    < > = values in this interval not displayed.       No results found for this or any previous visit (from the past 24 hour(s)).

## 2022-08-05 NOTE — PLAN OF CARE
Goal Outcome Evaluation:      Pt here with R MCV CVA. A&Ox2. Confused from time to time. VSS. Soft bite size diet, thin liquids. Takes pills whole with apple sauce and sips of water. Up with 2-3 lift. R side back pain. Pt scoring yellow on the Aggression Stop Light Tool. Plan TO TCU. Discharge pending placement.

## 2022-08-05 NOTE — PROGRESS NOTES
[facility]  WOC Nurse Inpatient Assessment     Consulted for: vac dressing changes to Right lower back wound (Stage 3 HAPI)    Pt hx per chart: Cristy Bailey is a 77 year old female with a PMHx significant for morbid obesity, hypertension (not on/needing meds), and hyperlipidemia (not on/needing meds), who presented to [facility] ([place], [place]) 7/13/2022 with complete left-sided paralysis, left-sided facial droop, and difficulty speaking. CTA head remarkable for right MCA occlusion. She was given tenecteplase and subsequently transferred to [facility] 7/13/2022 for thrombectomy.     Areas Assessed:      Areas visualized during today's visit: back, buttocks    Wound location: Right lower back in waist crease      Last Photo: 8-5-22 8-5-22 8-2-22 7-28-22 (pre-debridement)      Wound due to: Wound due to: Hospital Acquired Pressure injury  Stage: Unstageable 7/28, Following surgical debridement 7/30 Stage 3  Pressure Injury and Moisture Associated Skin Damage (MASD), suspect intertriginous pressure, appears to be deeper tissue damage within a crease, possible shear component from lift.  Wound history/plan of care: Pt frequently refusing repositioning and turning due to back pain.  Wound first noted 7/16 as a blister and maroon area, then deteriorated and needed I&D.  Pt needs 3-4 staff for repositioning and ceiling lift.  3 staff assisting WOC during dressing change today 8/5.       Surgical date: 7-30-22 Dr. Black     Date Negative Pressure Wound Therapy initiated: 8-2-22     Interventions in place: repositioning, specialty surface in use, moisture/incontinence management and offloading    Is patient s nutritional status compromised? yes   a. If yes, what interventions are in place? Protein supplements    Reason for initiating vac therapy? Presence of co-morbidities, High risk of infections and Need for accelerated granulation  tissue    Which?of?the?following?co-morbidities?apply? Obesity  a. If diabetic is patient on a diabetic management program? N/A     Is osteomyelitis present in wound? no  a.  If yes what treatments are in place? N/A    Wound base: red moist tissue, becoming granular, scant tan moist tissue     Palpation of the wound bed: normal       Drainage: moderate      Description of drainage: serosanguinous      Measurements (length x width x depth, in cm) approx 3.5 x 13 x 2cm, somewhat positional     Tunneling N/A      Undermining approx 3cm near 8-9 o'clock  Periwound skin: scattered small areas of necrosis along wound edges that are evolving and currently moist red and tan; general mild erythema and maceration; small slit wound medial in crease      Color: pink       Temperature: normal to warm and sweaty  Odor: minimal  Pain: moderate to briefly severe at times  Pain intervention prior to dressing change: premedicated; distraction techniques very effective  Treatment goal: Heal  and Increase granulation  STATUS: improving   Supplies ordered: at bedside    Number of foam pieces removed from a wound (excluding foam for bridge) : 1 GranuFoam Black   Verified this matched the number of foam pieces applied last dressing change: Yes   Number of foam pieces packed into wound (excluding foam for bridge) : 3 GranuFoam Black       Wound Location: Buttock (not photographed today 8/5 but remains intact)        Wound due to: Blanchable erythema, not currently a pressure injury  Wound history/plan of care: Pt with large body habitus and difficulty with repositioning due to back pain. No changes 8/1 remains blanchable  Wound base: 100 % blanchable      Palpation of the wound bed: normal      Drainage: none     Description of drainage: none     Measurements (length x width x depth, in cm): 1.5  x 5  x  0 cm      Tunneling: N/A     Undermining: N/A  Periwound skin: Intact and hyperpigmentation, venous congestion and chronic skin changes    "   Color: pink, purple and brown      Temperature: normal   Odor: none  Pain: denies , none  Pain interventions prior to dressing change: N/A  Treatment goal: Protection  STATUS: initial assessment  Supplies ordered: none necessary      - Buttock intact at this time and erythema most likely due to linens underneath patient.  Mild denudement along inner buttocks and perineal/labia area.  Dressings would add additional material at this time and are not necessary. Continue to encourage PIP measures    -BL legs dry flaking skin with hemosiderin staining. Legs occasionally weeping from areas that appear to spontaneously open and close. EdemaWear in room      Pressure Injury Prevention (PIP) Plan:  If patient is declining pressure injury prevention interventions: Explore reason why and address patient's concerns, Educate on pressure injury risk and prevention intervention(s), If patient is still declining, document \"informed refusal\"  and Ensure Care team is aware ( provider, charge nurse, etc)  Mattress: Bariatric low air loss  HOB: Maintain at or below 30 degrees, unless contraindicated  Repositioning in bed: Every 1-2 hours , Left/right positioning; avoid supine and Raise foot of bed prior to raising head of bed, to reduce patient sliding down (shear)  Heels: Keep elevated off mattress and Pillows under calves  Protective Dressing: None  Positioning Equipment: None  Chair positioning: Chair cushion (#[id number])  and Assist patient to reposition hourly   If patient has a buttock pressure injury, or high risk for PI use chair cushion or SPS.  Moisture Management: Perineal cleansing /protection: Follow Incontinence Protocol, Avoid brief in bed and Clean and dry skin folds with bathing   Under Devices: Inspect skin under all medical devices during skin inspection  and Ensure tubes are stabilized without tension  Ask provider to discontinue device when no longer needed.        Treatment Plan:     Negative pressure wound " therapy plan:  Wound location: right lower back   Change Days: Mon/Wed/Fri by [facility] RN    Supplies (including all accessories) used: medium Black foam   Cleanse with Vashe prior to replacing VAC    Suction setting: -125   Methods used: Window paned all periwound skin with vac drape prior to applying sponge, Bridged trac pad off bony prominences and Placed barrier ring into periwound creases to improve seal    Staff RN to assess integrity of dressing and ensure suction is set at appropriate level every shift.   Date canister. Chart canister output every shift. Change cannister weekly and PRN if full/occluded     Remove foam dressing and replace with BID normal saline or Vashe moist gauze dressing if:   -a dressing failure which cannot be repaired within 2 hours   -patient is discharging to home without a home pump   -patient is discharging to a facility outside the local area      The hospital VAC pump is not to be discharged with the patient.?Ensure to disconnect patient from machine prior to discharge. Then,    - If a home KCI VAC pump has been delivered, connect home cannister to dressing tubing then connect cannister to home pump and turn on machine    - If transferring to a nearby facility with a KCI vac, can disconnect and clamp tubing then cover end with glove so can be reconnected within 2 hours        Pannus: BID and PRN  1.Cleanse the area with Azra cleanse and protect and marga dry wipes/soft cloth.   2. Apply ostomy powder (#7639) on all reddened or denuded skin.   3. Apply nickel thick layer of Triad paste (#[id number]) to wound bed and thin layer over reddened areas   4. Ok to use Interdry AG to all other intact tissue. Ensure at least 2 inch tail is left outside of body to wick moisture. Single layer pillow case can also be used to help keep pannus dry if interdry is not staying well.     **If complete removal of paste is necessary use baby oil/mineral oil (Located in Pharmacy) and soft wash  cloth.    Perineal cares: every shift and prn:  1.  Remove PureWick if using and cleanse all areas thoroughly with generous amount Azra perineal lotion and dry cloths.  Spray the Azra directly onto the skin.   2.  Antifungal powder to folds  3.  Reposition PureWick with every turn/repo  4.  Pt should not be up in chair more than about 2 hrs at a time for skin health      Orders: Reviewed and Updated    RECOMMEND PRIMARY TEAM ORDER: None, at this time  Education provided: importance of repositioning, plan of care, Infection prevention  and Off-loading pressure  Discussed plan of care with: Patient and Nurse  WOC nurse follow-up plan: Monday/WednesdayFriday  Notify WOC if wound(s) deteriorate.  Nursing to notify the Provider(s) and re-consult the WOC Nurse if new skin concern.    DATA:     Current support surface: Bariatric Low air loss mattress    Containment of urine/stool: Incontinence Protocol and Incontinent pad in bed  BMI: Body mass index is 55.36 kg/m .   Active diet order: Orders Placed This Encounter      Advance Diet as Tolerated      Mechanical/Dental Soft Diet     Output: I/O last 3 completed shifts:  In: 120 [P.O.:120]  Out: -      Labs:   Recent Labs   Lab 08/04/22  0709   HGB 12.7   WBC 7.1     Pressure injury risk assessment:   Sensory Perception: 3-->slightly limited  Moisture: 2-->very moist  Activity: 2-->chairfast  Mobility: 2-->very limited  Nutrition: 2-->probably inadequate  Friction and Shear: 1-->problem  Demetrio Score: 12    Talia Linda RN CWOCN   Dept. Pager: [phone]  Dept. Office Number: [phone]

## 2022-08-05 NOTE — PROGRESS NOTES
Care Management Follow Up    Length of Stay (days): 23    Expected Discharge Date: 08/10/2022     Concerns to be Addressed:       Patient plan of care discussed at interdisciplinary rounds: Yes    Anticipated Discharge Disposition: Home     Anticipated Discharge Services:    Anticipated Discharge DME:      Patient/family educated on Medicare website which has current facility and service quality ratings:    Education Provided on the Discharge Plan:    Patient/Family in Agreement with the Plan:      Referrals Placed by CM/SW:    Private pay costs discussed: Not applicable    Additional Information:  Writer received follow up call from Ion Todd requesting updated notes for patient as the initial referral was from ~ weeks ago. Writer resent via [facility] for bed availability.    Will continue to follow.    FADUMO Juarez, LGSW    [facility]

## 2022-08-06 NOTE — PLAN OF CARE
Pt is alert to self and occasionally place. PIV pulled last night- patient care order to not place another. Dr. Howe called and updated Kaylynn today. Wound vac replaced by WOC nurse. PRN Oxy given x2 this shift (Q3h). Soft and bite sized very poor appetite. Ax4 w/ lift to BSC and chair. Pills crushed in applesauce. Resumed Lasix. Waiting for placement.

## 2022-08-06 NOTE — PLAN OF CARE
Pt is alert to self and place, Oxy given x2 this shift. Scheduled ativan given at supper. Poor appetite. Potassium sort of replaced- Pt drank a couple sips of the packet but refused the rest, came back WDL. Ax4 w/ lift. Refused to get up into the chair. Refuses T&R, does not want to be moved. Ice packs applied to back and feet. Pt occasionally requires encouragement to take meds.

## 2022-08-06 NOTE — PROGRESS NOTES
[facility]    Hospitalist Progress Note      Assessment & Plan   Cristy Bailey is a 77 year old female with a PMHx significant for morbid obesity, hypertension (not on/needing meds), and hyperlipidemia (not on/needing meds), who presented to [facility] 7/13/2022 with complete left-sided paralysis, left-sided facial droop, and difficulty speaking. CTA head remarkable for right MCA occlusion. She was given tenecteplase and subsequently transferred to [facility] 7/13/2022 for thrombectomy.     Acute right MCA ischemic stroke with edema and right to left midline shift  S/p tenecteplase and subsequent R MCA mechanical thrombectomy 7/13/22  * Presented to [facility] 7/13 with complete paralysis, left-sided facial droop, and aphasia. Code stroke initiated. Head CT 7/13 showed signs of an evolving R MCA infarct. CTA head 7/13 showed a right carotid terminus occlusion, right middle cerebral artery M1  segment occlusion with poor opacification of the more distal right MCA branches/poor collateral flow. CTA neck 7/13 negative for acute occlusions. Pt given tenecteplase 7/13 at 13:11. Noted to be agitated and was subsequently intubated given need for procedure.   * Subsequently transferred to [facility] 7/13/2022 where she underwent above procedure.   7/14: Extubated. Head CT 7/14 showed evolution of acute infarct involving the R MCA distribution with increased swelling, cortical effacement, and new mild right-to-left midline shift; questionable hypoattenuation involving the bilateral occipital lobes which could be artifactual.   * Additional stroke workup pursued this stay -- echo 7/14 showed EF 50%, grade 1 diastolic dysfunction   * Started ASA and atorvastatin per stroke team.   * Repeat head CT 7/15 showed evolving R MCA stroke with areas of edema, overall stable.  -- conts on full dose ASA and statin  -- goal SBP <140/90  -- 30d cardiac event monitor at discharge  -- cont Seroquel 25mg  HS, 12.5mg at dinner and 12.5mg po q6h prn (for agitation/restlessness)  -- cont PT/OT, family previously desired to take her home but now in agreement with placement (needing assist of 2-3 and use of lift this stay)  -- mgmt of dysphagia as below  -- will need to follow up with [facility] in 6-8 wks (due beginning of September)     Dysphagia due to CVA  * Seen by SLP and noted with dysphagia. Required some intermittent fluid boluses.  -- cont mechanical soft diet per SLP recs     Dyslipidemia  * FLP this stay showed tot cholest 163, HDL 47, LDL 91, .   * Started on atorvastatin 40 mg daily     Volume overload dt diastolic CHF exacerbation: Improved  Essential hypertension  * Not on/needing meds PTA.   * As above, echo this stay showed EF 50% with grade I diastolic dysfunction; RV not well visualized but appeared mild-moderately dilated with global systolic function probably mildly reduced.   * BPs were initially soft this stay and required IVFs.   * BPs improved, ultimately developed pedal edema required IV Lasix   * Started on lisinopril this stay  -- cont lisinopril 10mg daily  -- cont Lasix 20mg po in afternoon and restarted 40mg PO lasix in AM    Prolonged QTc   * EKG on 7/13 showed QTc 494.   * Repeat EKG on 7/30 (while on Levaquin) showed QTc table at 475.   -- monitoring on telemetry this stay     Chronic bilateral LE edema with chronic venous stasis dermatitis and chronic skin changes  Hx of LLE cellulitis  * Was hospitalized in 11/2021 for sepsis dt to pneumonia and LLE cellulitis.   * During this stay, BLE noted to be patty and edematous, no unilateral leg swelling appreciated; LLE had patchy areas of erythema, no fluctuance, no painful areas with palpation; legs warm to touch. Lymphedema consulted.   -- cont LE elevation, lymphedema wraps  -- diuresis as above     Lower back/R flank wound/abscess, suspect dt pressure type injury, s/p surgical debridement on 7/30/22  OA  Hx of bilateral hip replacements    Hx of chronic neck and back pain  * Pt with significant back pain early on in hospital stay. Was requiring IV hydromorphone.  Dose had to be decreased dt somnolence.  * MRI L-spine in 2018 showed multilevel degenerative changes with mild central stenosis. Patient has chronic back pain, reports having it over the last 2 years.   * During this stay, patient was noted with 5-6cm by 2-3cm wound with swelling/fluid/blistering in a fold of her lower back, did not appear infected; this seemed to be the source of her pain. Padded dressing placed and WOC RN ordered. Pain initially improved.   * Was placed on course of Augmentin on 7/24.   * On 7/26, was re-evaluated by WOC RN. Wound appeared more erythematous and purulence expressed. Worsening with shear stress from lift. Procal <0.05, WBC WNL. Abx changed to IV clindamycin.  * Wound cultures obtained. On 7/28, wound grew proteus and staph aureus (MRSA neg). US neg for abscess.   * Lost IV access on 7/28 so abx were changed to oral clindamycin and oral levaquin. IV access re-established but was continued on oral abx.  * General surgery consulted, ultimately underwent excisional debridement of R flank abscess in OR on 7/30. Intraop cultures showed polymicrobial growth with proteus mirabilis x2 strains (both pan-sensitive), corynebacterium striatum and enterococcus faecalis.  * Wound vac placed per general surgery on 8/2  * ID consulted on 8/2 and abx narrowed to Augmentin alone, recommended 5-7d of treatment  -- cont Augmentin for 5 days total.  -- routine postop cares per gen surgery   -- wound vac mgmt per WOC RN, MWF changes  -- prns available for pain  -- cont regular repositioning     Suspected meningioma left parietal region, incidentally seen on CT  * Head CT 7/13 showed a calcified extra-axial mass overlying the left parietal region measuring 1.4 cm, potentially representing a meningioma.  * Will need serial monitoring OP after discharge.      Anemia, suspect  dilutional component  * Hgb normal on admit. Hgb 11.5 on 7/16. No overt clinical signs of major bleeding.  * Hgb now stable at 12-13  -- monitor labs periodically     Intertriginous dermatitis  * Chronic and stable, cont clotrimazole powder     Constipation  * Continue sched bowel regimen     Hypokalemia  Hypophosphatemia  * Potassium and phosphorus low at times this hospitalization. Treated with replacement.     Morbid obesity  * BMI 59 this stay. Recommend aggressive dietary and lifestyle modifications as condition improves     Moderate malnutrition in context of acute illness and chronic disease  * Nutritionist following. On modified diet as above.     Clinically Significant Risk Factors Present on Admission                        DVT Prophylaxis: Enoxaparin (Lovenox) SQ  Code Status: Full Code     Expected Discharge Date: 08/10/2022    Discharge Delays: Placement - LTC  Financial counseling needed    Discharge Comments: LTC  Patient has wound vac       Melissa Howe, DO  Hospitalist Service  [facility]  Securely message with the Vocera Web Console (learn more here)  Text Page (7am - 6pm) via [employer] Paging/Directory      Interval History   Patient seen and examined. Apparently more agitated yesterday evening. Not pleasant to nursing later in the day. Complaining of pain near her wound this morning. Cannot answer questions regarding last bowel movement. Sleeping okay. Discussed with RN. Trial seroquel scheduled at dinner.    -Data reviewed today: I reviewed all new labs and imaging results over the last 24 hours. I personally reviewed no images or EKG's today.    Physical Exam   Temp: 97.6  F (36.4  C) Temp src: Axillary BP: (!) 134/113 Pulse: 75   Resp: 16 SpO2: 92 % O2 Device: None (Room air)    Vitals:    07/23/22 1102 07/28/22 1508 08/05/22 0926   Weight: (!) 174.6 kg (385 lb) (!) 159.2 kg (350 lb 14.4 oz) (!) 155.6 kg (343 lb)     Vital Signs with Ranges  Temp:  [97.6  F (36.4   C)-98.8  F (37.1  C)] 97.6  F (36.4  C)  Pulse:  [70-86] 75  Resp:  [16-18] 16  BP: (100-134)/() 134/113  SpO2:  [92 %-96 %] 92 %  I/O last 3 completed shifts:  In: 480 [P.O.:480]  Out: 400 [Urine:400]    Constitutional: Awake, alert, cooperative, no apparent distress  Respiratory: Clear to auscultation bilaterally, no crackles or wheezing  Cardiovascular: Regular rate and rhythm, normal S1 and S2, and no murmur noted  GI: Normal bowel sounds, soft, non-distended, non-tender  Skin/Integumen: No rashes, no cyanosis, +1 edema, dry skin with venous stasis changes (wraps not in place)  Other: Wound vac right flank present    Medications     - MEDICATION INSTRUCTIONS -         acetaminophen  975 mg Oral Q8H     amoxicillin-clavulanate  1 tablet Oral Q12H [facility] (08/20)     aspirin  325 mg Oral Daily     atorvastatin  40 mg Oral QPM     diclofenac  2 g Topical 4x Daily     enoxaparin ANTICOAGULANT  40 mg Subcutaneous Q12H     furosemide  20 mg Oral Daily at 4 pm     furosemide  40 mg Oral Daily     lactobacillus rhamnosus (GG)  1 capsule Oral BID     lisinopril  10 mg Oral Daily     miconazole   Topical BID     multivitamin, therapeutic  1 tablet Oral Daily     polyethylene glycol  17 g Oral Daily     potassium chloride  40 mEq Oral or Feeding Tube Once     pramoxine   Topical TID     QUEtiapine  12.5 mg Oral Daily with supper     QUEtiapine  25 mg Oral At Bedtime     senna-docusate  1-2 tablet Oral BID       Data   Recent Labs   Lab 08/05/22  0748 08/04/22  1709 08/04/22  0709 08/03/22  0921 08/02/22  1607 08/02/22  0747 07/31/22  0645   WBC  --   --  7.1  --   --  8.2 6.8   HGB  --   --  12.7  --   --  13.1 14.4   MCV  --   --  88  --   --  87 92     --  396  --   --  388 366   NA  --   --  140  --   --  136 135   POTASSIUM  --  3.9 3.3* 3.6   < > 3.3* 3.7   CHLORIDE  --   --  102  --   --  101 102   CO2  --   --  33*  --   --  31 28   BUN  --   --  18  --   --  15 11   CR 0.62  --  0.67  --   --  0.71 0.62    ANIONGAP  --   --  5  --   --  4 5   ASHIA  --   --  8.6  --   --  8.6 8.9   GLC  --   --  104*  --   --  110* 97    < > = values in this interval not displayed.       No results found for this or any previous visit (from the past 24 hour(s)).

## 2022-08-07 NOTE — PLAN OF CARE
Shift [id number]  Pt here with R MCA CVA s/p thrombectomy. Pt disoriented to time/situation, anxious and calls for help constantly. VSS on Ra. Pain controlled w/prn oxy and ice packs along with scheduled tylenol. Pt A+3-4 with lift, currently refusing repositioning overnight. Wound vac on R lower back. Excoriation in abd folds. Purewhick in place with good output. Neuros intact, does not lift legs d/t pain. Soft/bite sized diet with poor appetite, takes pills whole with applesauce (will ask to cut big pills in half). Pt scoring green on aggression stoplight tool. Discharge to TCU pending placement. Continue to monitor.

## 2022-08-07 NOTE — PROGRESS NOTES
Care Management Follow Up    Length of Stay (days): 25    Expected Discharge Date: 08/10/2022     Concerns to be Addressed:       Patient plan of care discussed at interdisciplinary rounds: Yes    Anticipated Discharge Disposition: Home     Anticipated Discharge Services:    Anticipated Discharge DME:      Patient/family educated on Medicare website which has current facility and service quality ratings:    Education Provided on the Discharge Plan:    Patient/Family in Agreement with the Plan:      Referrals Placed by CM/SW:    Private pay costs discussed:     Additional Information:  Returned a call to daughter Kaylynn who called on Friday requesting to speak with staff regarding the pending MA application.  Writer called Kaylynn Umaña who is daughter and 2nd Alternate HCA.  Kaylynn reports she since she left a message to speak with us, shespoke with Gabe, our financial counselor who explains she needs to sign the MA application as she is next of kin. So she feels she has had her questions answered regarding the MA application.   She understands now that Leo Akbar, patient's significant other of 30 years and patient's primary HCA is able to make medical decisions but the Medicaid system requires a next of kin which Leo is not.     Kaylynn spoke with Leo regarding discharge recommendation  to a LTC facility and that MA will be needed to help with the cost,  however Leo wants to bring patient home.  Based on input Kaylynn has received from Dr Howe that patient requires two staff and a lift for transfers, Kaylynn is concerned with Leo's plan.  Kaylynn shared this with Leo and she reports his response is that patient hasn't been given a chance to stand.  Writer did explain patient is being followed by therapy.  Kaylynn is coming to the hospital tomorrow.  She lives 3 hours away.    She would like herself and Leo to meet together with the hospitalist to discuss patient's needs so they both hear the same information. Leo  tells Kaylynn he can come to the hospital for the meeting.  Plan:  A meeting with MD and member of care team with therapy input will be beneficial for family and to assist with the discharge plan.    Will ask Monday Care Management staff to alert hospitalist and therapy staff to meet with jalil/Leo.        POORNIMA Sanderson

## 2022-08-07 NOTE — PLAN OF CARE
Goal Outcome Evaluation:    Plan of Care Reviewed With: patient     B/P: 110/57, T: 98.2, P: 71, R: 16     A&Ox 1- disoriented to time, place, and situation. Neuros: disorientation, PERRLA, slow-deliberate finger to nose. Unable to do heel/shin due to pain. Pt scoring yellow on the Stoplight aggression tool , reorientation provided. VSS on RA. No IV access. Lung sounds clear/diminished. Bowel sounds active. Up with with lift. C/O pain in back/wound vac, prn oxy given. Soft/bite size diet, order intake encouraged. Takes pills whole or cut with water. Turned and repositioned as allowed, reposition education given, pain managed to encourage turning. Wound vac to -125 mm Hg.     Interventions this shift: Pain control, reorientation, care discussed with patient.     To address next shift: pain control, movement

## 2022-08-07 NOTE — PROGRESS NOTES
[facility]    Hospitalist Progress Note      Assessment & Plan   Cristy Bailey is a 77 year old female with a PMHx significant for morbid obesity, hypertension (not on/needing meds), and hyperlipidemia (not on/needing meds), who presented to [facility] 7/13/2022 with complete left-sided paralysis, left-sided facial droop, and difficulty speaking. CTA head remarkable for right MCA occlusion. She was given tenecteplase and subsequently transferred to [facility] 7/13/2022 for thrombectomy.     Acute right MCA ischemic stroke with edema and right to left midline shift  S/p tenecteplase and subsequent R MCA mechanical thrombectomy 7/13/22  * Presented to [facility] 7/13 with complete paralysis, left-sided facial droop, and aphasia. Code stroke initiated. Head CT 7/13 showed signs of an evolving R MCA infarct. CTA head 7/13 showed a right carotid terminus occlusion, right middle cerebral artery M1  segment occlusion with poor opacification of the more distal right MCA branches/poor collateral flow. CTA neck 7/13 negative for acute occlusions. Pt given tenecteplase 7/13 at 13:11. Noted to be agitated and was subsequently intubated given need for procedure.   * Subsequently transferred to [facility] 7/13/2022 where she underwent above procedure.   7/14: Extubated. Head CT 7/14 showed evolution of acute infarct involving the R MCA distribution with increased swelling, cortical effacement, and new mild right-to-left midline shift; questionable hypoattenuation involving the bilateral occipital lobes which could be artifactual.   * Additional stroke workup pursued this stay -- echo 7/14 showed EF 50%, grade 1 diastolic dysfunction   * Started ASA and atorvastatin per stroke team.   * Repeat head CT 7/15 showed evolving R MCA stroke with areas of edema, overall stable.  -- conts on full dose ASA and statin  -- goal SBP <140/90  -- 30d cardiac event monitor at discharge  -- cont Seroquel 25mg  HS, 12.5mg at dinner and 12.5mg po q6h prn (for agitation/restlessness)  -- cont PT/OT, family previously desired to take her home but now in agreement with placement (needing assist of 2-3 and use of lift this stay)  -- mgmt of dysphagia as below  -- will need to follow up with [facility] in 6-8 wks (due beginning of September)     Dysphagia due to CVA  * Seen by SLP and noted with dysphagia. Required some intermittent fluid boluses.  -- cont mechanical soft diet per SLP recs     Dyslipidemia  * FLP this stay showed tot cholest 163, HDL 47, LDL 91, .   * Started on atorvastatin 40 mg daily     Volume overload dt diastolic CHF exacerbation: Improved  Essential hypertension  * Not on/needing meds PTA.   * As above, echo this stay showed EF 50% with grade I diastolic dysfunction; RV not well visualized but appeared mild-moderately dilated with global systolic function probably mildly reduced.   * BPs were initially soft this stay and required IVFs.   * BPs improved, ultimately developed pedal edema required IV Lasix   * Started on lisinopril this stay  -- cont lisinopril 10mg daily  -- cont Lasix 40mg PO lasix in AM, 20mg po in afternoon     Prolonged QTc   * EKG on 7/13 showed QTc 494.   * Repeat EKG on 7/30 (while on Levaquin) showed QTc table at 475.   -- monitoring on telemetry this stay     Chronic bilateral LE edema with chronic venous stasis dermatitis and chronic skin changes  Hx of LLE cellulitis  * Was hospitalized in 11/2021 for sepsis dt to pneumonia and LLE cellulitis.   * During this stay, BLE noted to be patty and edematous, no unilateral leg swelling appreciated; LLE had patchy areas of erythema, no fluctuance, no painful areas with palpation; legs warm to touch. Lymphedema consulted.   -- cont LE elevation, lymphedema wraps  -- diuresis as above     Lower back/R flank wound/abscess, suspect dt pressure type injury, s/p surgical debridement on 7/30/22  OA  Hx of bilateral hip replacements   Hx of  chronic neck and back pain  * Pt with significant back pain early on in hospital stay. Was requiring IV hydromorphone.  Dose had to be decreased dt somnolence.  * MRI L-spine in 2018 showed multilevel degenerative changes with mild central stenosis. Patient has chronic back pain, reports having it over the last 2 years.   * During this stay, patient was noted with 5-6cm by 2-3cm wound with swelling/fluid/blistering in a fold of her lower back, did not appear infected; this seemed to be the source of her pain. Padded dressing placed and WOC RN ordered. Pain initially improved.   * Was placed on course of Augmentin on 7/24.   * On 7/26, was re-evaluated by WOC RN. Wound appeared more erythematous and purulence expressed. Worsening with shear stress from lift. Procal <0.05, WBC WNL. Abx changed to IV clindamycin.  * Wound cultures obtained. On 7/28, wound grew proteus and staph aureus (MRSA neg). US neg for abscess.   * Lost IV access on 7/28 so abx were changed to oral clindamycin and oral levaquin. IV access re-established but was continued on oral abx.  * General surgery consulted, ultimately underwent excisional debridement of R flank abscess in OR on 7/30. Intraop cultures showed polymicrobial growth with proteus mirabilis x2 strains (both pan-sensitive), corynebacterium striatum and enterococcus faecalis.  * Wound vac placed per general surgery on 8/2  * ID consulted on 8/2 and abx narrowed to Augmentin alone, recommended 5-7d of treatment  -- completed 5 day augmentin course on 8/7  -- routine postop cares per gen surgery   -- wound vac mgmt per WOC RN, MWF changes  -- prns available for pain  -- cont regular repositioning     Suspected meningioma left parietal region, incidentally seen on CT  * Head CT 7/13 showed a calcified extra-axial mass overlying the left parietal region measuring 1.4 cm, potentially representing a meningioma.  * Will need serial monitoring OP after discharge.      Anemia, suspect  dilutional component  * Hgb normal on admit. Hgb 11.5 on 7/16. No overt clinical signs of major bleeding.  * Hgb now stable at 12-13  -- monitor labs periodically      Intertriginous dermatitis  * Chronic and stable, cont clotrimazole powder     Constipation  * Continue sched bowel regimen     Hypokalemia  Hypophosphatemia  * Potassium and phosphorus low at times this hospitalization. Treated with replacement.     Morbid obesity  * BMI 59 this stay. Recommend aggressive dietary and lifestyle modifications as condition improves     Moderate malnutrition in context of acute illness and chronic disease  * Nutritionist following. On modified diet as above.     Clinically Significant Risk Factors Present on Admission                        DVT Prophylaxis: Enoxaparin (Lovenox) SQ  Code Status: Full Code    Expected Discharge Date: 08/10/2022    Discharge Delays: Placement - LTC  Financial counseling needed    Discharge Comments: LTC  Patient has wound vac       Melissa Howe, DO  Hospitalist Service  [facility]  Securely message with the Vocera Web Console (learn more here)  Text Page (7am - 6pm) via [employer] Paging/Directory      Interval History   Patient seen and examined. Anxious, worried about being hurt with repositioning. Refused repositioning overnight. Has neck pain this morning--she believes due to her position, heat pack applied. Discussed with RN    -Data reviewed today: I reviewed all new labs and imaging results over the last 24 hours. I personally reviewed no images or EKG's today.    Physical Exam   Temp: 97.6  F (36.4  C) Temp src: Oral BP: 127/67 Pulse: 75   Resp: 16 SpO2: 93 % O2 Device: None (Room air)    Vitals:    07/23/22 1102 07/28/22 1508 08/05/22 0926   Weight: (!) 174.6 kg (385 lb) (!) 159.2 kg (350 lb 14.4 oz) (!) 155.6 kg (343 lb)     Vital Signs with Ranges  Temp:  [97.6  F (36.4  C)-98.7  F (37.1  C)] 97.6  F (36.4  C)  Pulse:  [72-75] 75  Resp:  [16] 16  BP:  (104-127)/(61-72) 127/67  SpO2:  [93 %-95 %] 93 %  I/O last 3 completed shifts:  In: -   Out: 1400 [Urine:1400]    Constitutional: Awake, alert, cooperative, mildly anxious  Respiratory: Clear to auscultation bilaterally, no crackles or wheezing  Cardiovascular: Regular rate and rhythm, normal S1 and S2, and no murmur noted  GI: Normal bowel sounds, soft, non-distended, non-tender, obese  Skin/Integumen: No rashes, no cyanosis, +1 edema, dry skin with venous stasis changes (wraps not in place)  Other: Wound vac right flank present    Medications     - MEDICATION INSTRUCTIONS -         acetaminophen  975 mg Oral Q8H     amoxicillin-clavulanate  1 tablet Oral Q12H [facility] (08/20)     aspirin  325 mg Oral Daily     atorvastatin  40 mg Oral QPM     diclofenac  2 g Topical 4x Daily     enoxaparin ANTICOAGULANT  40 mg Subcutaneous Q12H     furosemide  20 mg Oral Daily at 4 pm     furosemide  40 mg Oral Daily     lactobacillus rhamnosus (GG)  1 capsule Oral BID     lisinopril  10 mg Oral Daily     miconazole   Topical BID     multivitamin, therapeutic  1 tablet Oral Daily     polyethylene glycol  17 g Oral Daily     potassium chloride  40 mEq Oral or Feeding Tube Once     potassium chloride  40 mEq Oral or Feeding Tube Once     pramoxine   Topical TID     QUEtiapine  12.5 mg Oral Daily with supper     QUEtiapine  25 mg Oral At Bedtime     senna-docusate  1-2 tablet Oral BID       Data   Recent Labs   Lab 08/07/22  0724 08/06/22  1714 08/06/22  1634 08/06/22  0935 08/05/22  0748 08/04/22  1709 08/04/22  0709 08/02/22  1607 08/02/22  0747   WBC  --   --   --  7.0  --   --  7.1  --  8.2   HGB  --   --   --  13.4  --   --  12.7  --  13.1   MCV  --   --   --  89  --   --  88  --  87   PLT  --   --   --  408 430  --  396  --  388   NA  --   --   --  138  --   --  140  --  136   POTASSIUM 3.3* 3.7 3.5 3.3*  --    < > 3.3*   < > 3.3*   CHLORIDE  --   --   --  102  --   --  102  --  101   CO2  --   --   --  29  --   --  33*  --  31    BUN  --   --   --  16  --   --  18  --  15   CR  --   --   --  0.62 0.62  --  0.67  --  0.71   ANIONGAP  --   --   --  7  --   --  5  --  4   ASHIA  --   --   --  9.1  --   --  8.6  --  8.6   GLC  --   --   --  96  --   --  104*  --  110*    < > = values in this interval not displayed.       No results found for this or any previous visit (from the past 24 hour(s)).

## 2022-08-08 NOTE — PROGRESS NOTES
Care Management Follow Up    Length of Stay (days): 26    Expected Discharge Date: 08/10/2022     Concerns to be Addressed:       Patient plan of care discussed at interdisciplinary rounds: Yes    Anticipated Discharge Disposition: Home     Anticipated Discharge Services:    Anticipated Discharge DME:      Patient/family educated on Medicare website which has current facility and service quality ratings:    Education Provided on the Discharge Plan:    Patient/Family in Agreement with the Plan:      Referrals Placed by CM/SW:    Private pay costs discussed: Not applicable    Additional Information:  Writer met with patient, patient's significant other, daughter, and hospitalist to discuss recommendations and review medical care.     Patient's family present during wound dressing and are aware that it is not feasible/safe for patient to return home d/t medical need for wound vac and assistance levels. Requested for TCU referrals to be sent near [facility] - writer sent via DOD and resent previous referrals that did not have bed availability at the time. Writer reviewed barriers to discharge: bariatric bed availability, not covid vaccinated (family discussing and potentially open to vaccination) - family in understanding. Family also aware that should patient need to be LTC (hopeful with rehab to return home when safe) that it needs to be a facility who accepts medical assistance. Daughter has application and working with financial counselor.     Resent/[facility] new [facility]errr[facility]: [facility], [facility], [facility], [facility], [facility], and [facility].     Will continue to follow.    FADUMO Juarez, LGSW    [facility]

## 2022-08-08 NOTE — PLAN OF CARE
VSS on room air. Refuses to wear tele. A/O to self only. Wound vac at -125 negative pressure, changed on shift per WOC. Wound care performed x1 per plan of care, compression stockings placed on BLE. Snow area excoriated with scant blood. Pain controlled with PRN oxycodone and PRN seroquel given x1 for anxiety. Up with mechanical lift and Ax2-3, up to chair on shift.  Mechanical soft diet with thin liquids, minimal appetite. BS active, Flatus +, no BM on shift but incontinent. Voiding to purewick. LS clear. Discharge pending placement.

## 2022-08-08 NOTE — PLAN OF CARE
Goal Outcome Evaluation:    Alert to self, labile mood, anxious, demanding. VSS on RA. Up assist x3 with lift. Back pain managed witgh scheduled tylenol, PRN oxycodone x2. Tenderness with movements, turn/repo q2-3hr. Wound vac to R lower back, serosanguinous output. Generalized edema +2-3, BLE patty/red with chronic skin changes. R hip wound oozing serous drainage, mepilex applied. Incontinent, purewick in place, 1 BM overnight. Minced/moist diet with thin liquids. Discharge pending placement, possibly LTC facility. Per SW note, daughter and  will be coming to hospital today to discuss discharge options with hospitalist and care team.

## 2022-08-08 NOTE — PROGRESS NOTES
[facility]  WOC Nurse Inpatient Assessment     Consulted for: vac dressing changes to Right lower back wound (Stage 3 HAPI)    Pt hx per chart: Cristy Bailey is a 77 year old female with a PMHx significant for morbid obesity, hypertension (not on/needing meds), and hyperlipidemia (not on/needing meds), who presented to [facility] 7/13/2022 with complete left-sided paralysis, left-sided facial droop, and difficulty speaking. CTA head remarkable for right MCA occlusion. She was given tenecteplase and subsequently transferred to [facility] 7/13/2022 for thrombectomy.     Areas Assessed:      Areas visualized during today's visit: back, buttocks    Wound location: Right lower back in waist crease      8-8-22 8-2-22 7-28-22 (pre-debridement)      Wound due to: Wound due to: Hospital Acquired Pressure injury  Stage: Unstageable 7/28, Following surgical debridement 7/30 Stage 3  Pressure Injury and Moisture Associated Skin Damage (MASD), suspect intertriginous pressure, appears to be deeper tissue damage within a crease, possible shear component from lift.  Wound history/plan of care: Pt frequently refusing repositioning and turning due to back pain.  Wound first noted 7/16 as a blister and maroon area, then deteriorated and needed I&D.  Pt needs 3-4 staff for repositioning and ceiling lift.  3 staff assisting WOC during dressing change today 8/5.       Surgical date: 7-30-22 Dr. Black     Date Negative Pressure Wound Therapy initiated: 8-2-22     Interventions in place: repositioning, specialty surface in use, moisture/incontinence management and offloading    Is patient s nutritional status compromised? yes   a. If yes, what interventions are in place? Protein supplements    Reason for initiating vac therapy? Presence of co-morbidities, High risk of infections and Need for accelerated granulation tissue    Which?of?the?following?co-morbidities?apply?  Obesity  a. If diabetic is patient on a diabetic management program? N/A     Is osteomyelitis present in wound? no  a.  If yes what treatments are in place? N/A    Wound base: red moist tissue, becoming more granular     Palpation of the wound bed: normal       Drainage: moderate      Description of drainage: serosanguinous      Measurements (length x width x depth, in cm) approx 3.5 x 13 x 2cm, somewhat positional (No changes)     Tunneling N/A      Undermining approx 3cm near 8-9 o'clock (No changes)  Periwound skin: scattered small areas of necrosis along wound edges that are evolving and currently moist red and tan; general mild erythema and maceration; small slit wound medial in crease      Color: pink       Temperature: normal to warm and sweaty  Odor: minimal  Pain: moderate to briefly severe at times  Pain intervention prior to dressing change: premedicated; distraction techniques very effective  Treatment goal: Heal  and Increase granulation  STATUS: improving   Supplies ordered: at bedside    Number of foam pieces removed from a wound (excluding foam for bridge) : 3 GranuFoam Black   Verified this matched the number of foam pieces applied last dressing change: Yes   Number of foam pieces packed into wound (excluding foam for bridge) : 2 GranuFoam Black       Wound Location: Buttock (not photographed today 8/8 but remains intact)        Wound due to: Blanchable erythema, not currently a pressure injury  Wound history/plan of care: Pt with large body habitus and difficulty with repositioning due to back pain. No changes 8/1 remains blanchable  Wound base: 100 % blanchable      Palpation of the wound bed: normal      Drainage: none     Description of drainage: none     Measurements (length x width x depth, in cm): 1.5  x 5  x  0 cm      Tunneling: N/A     Undermining: N/A  Periwound skin: Intact and hyperpigmentation, venous congestion and chronic skin changes      Color: pink, purple and brown       "Temperature: normal   Odor: none  Pain: denies , none  Pain interventions prior to dressing change: N/A  Treatment goal: Protection  STATUS: initial assessment  Supplies ordered: none necessary      - Buttock intact at this time and erythema most likely due to linens underneath patient.  Mild denudement along inner buttocks and perineal/labia area.  Dressings would add additional material at this time and are not necessary. Continue to encourage PIP measures    -BL legs dry flaking skin with hemosiderin staining. Legs occasionally weeping from areas that appear to spontaneously open and close. EdemaWear in room      Pressure Injury Prevention (PIP) Plan:  If patient is declining pressure injury prevention interventions: Explore reason why and address patient's concerns, Educate on pressure injury risk and prevention intervention(s), If patient is still declining, document \"informed refusal\"  and Ensure Care team is aware ( provider, charge nurse, etc)  Mattress: Bariatric low air loss  HOB: Maintain at or below 30 degrees, unless contraindicated  Repositioning in bed: Every 1-2 hours , Left/right positioning; avoid supine and Raise foot of bed prior to raising head of bed, to reduce patient sliding down (shear)  Heels: Keep elevated off mattress and Pillows under calves  Protective Dressing: None  Positioning Equipment: None  Chair positioning: Chair cushion (#112970)  and Assist patient to reposition hourly   If patient has a buttock pressure injury, or high risk for PI use chair cushion or SPS.  Moisture Management: Perineal cleansing /protection: Follow Incontinence Protocol, Avoid brief in bed and Clean and dry skin folds with bathing   Under Devices: Inspect skin under all medical devices during skin inspection  and Ensure tubes are stabilized without tension  Ask provider to discontinue device when no longer needed.        Treatment Plan:     Negative pressure wound therapy plan:  Wound location: right lower " back   Change Days: Mon/Wed/Fri by [facility] RN    Supplies (including all accessories) used: medium Black foam   Cleanse with Vashe prior to replacing VAC    Suction setting: -125   Methods used: Window paned all periwound skin with vac drape prior to applying sponge, Bridged trac pad off bony prominences and Placed barrier ring into periwound creases to improve seal    Staff RN to assess integrity of dressing and ensure suction is set at appropriate level every shift.   Date canister. Chart canister output every shift. Change cannister weekly and PRN if full/occluded     Remove foam dressing and replace with BID normal saline or Vashe moist gauze dressing if:   -a dressing failure which cannot be repaired within 2 hours   -patient is discharging to home without a home pump   -patient is discharging to a facility outside the local area      The hospital VAC pump is not to be discharged with the patient.?Ensure to disconnect patient from machine prior to discharge. Then,    - If a home KCI VAC pump has been delivered, connect home cannister to dressing tubing then connect cannister to home pump and turn on machine    - If transferring to a nearby facility with a KCI vac, can disconnect and clamp tubing then cover end with glove so can be reconnected within 2 hours        Pannus: BID and PRN  1.Cleanse the area with Azra cleanse and protect and marga dry wipes/soft cloth.   2. Apply ostomy powder (#7639) on all reddened or denuded skin.   3. Apply nickel thick layer of Triad paste (#[id number]) to wound bed and thin layer over reddened areas   4. Ok to use Interdry AG to all other intact tissue. Ensure at least 2 inch tail is left outside of body to wick moisture. Single layer pillow case can also be used to help keep pannus dry if interdry is not staying well.     **If complete removal of paste is necessary use baby oil/mineral oil (Located in Pharmacy) and soft wash cloth.    Perineal cares: every shift and prn:  1.   Remove PureWick if using and cleanse all areas thoroughly with generous amount Azra perineal lotion and dry cloths.  Spray the Azra directly onto the skin.   2.  Antifungal powder to folds  3.  Reposition PureWick with every turn/repo  4.  Pt should not be up in chair more than about 2 hrs at a time for skin health      Orders: Reviewed and Updated    RECOMMEND PRIMARY TEAM ORDER: None, at this time  Education provided: importance of repositioning, plan of care, Infection prevention  and Off-loading pressure  Discussed plan of care with: Patient and Nurse  WOC nurse follow-up plan: Monday/WednesdayFriday  Notify WOC if wound(s) deteriorate.  Nursing to notify the Provider(s) and re-consult the WOC Nurse if new skin concern.    DATA:     Current support surface: Bariatric Low air loss mattress    Containment of urine/stool: Incontinence Protocol and Incontinent pad in bed  BMI: Body mass index is 55.36 kg/m .   Active diet order: Orders Placed This Encounter      Advance Diet as Tolerated      Mechanical/Dental Soft Diet     Output: I/O last 3 completed shifts:  In: 600 [P.O.:600]  Out: 300 [Urine:300]     Labs:   Recent Labs   Lab 08/08/22  1000   HGB 12.9   WBC 7.4     Pressure injury risk assessment:   Sensory Perception: 3-->slightly limited  Moisture: 3-->occasionally moist  Activity: 2-->chairfast  Mobility: 2-->very limited  Nutrition: 3-->adequate  Friction and Shear: 2-->potential problem  Demetrio Score: 15    Telly Meyer RN CWOCN   Dept. Pager: [phone]  Dept. Office Number: [phone]

## 2022-08-08 NOTE — PROGRESS NOTES
[facility]    Hospitalist Progress Note      Assessment & Plan   Cristy Bailey is a 77 year old female with a PMHx significant for morbid obesity, hypertension, and hyperlipidemia, who presented to [facility] 7/13/2022 with complete left-sided paralysis, left-sided facial droop, and difficulty speaking. CTA head remarkable for right MCA occlusion. She was given tenecteplase and subsequently transferred to [facility] 7/13/2022 for thrombectomy.     Acute right MCA ischemic stroke with edema and right to left midline shift  S/p tenecteplase and subsequent R MCA mechanical thrombectomy 7/13/22  * Presented to [facility] 7/13 with complete paralysis, left-sided facial droop, and aphasia. Code stroke initiated. Head CT 7/13 showed signs of an evolving R MCA infarct. CTA head 7/13 showed a right carotid terminus occlusion, right middle cerebral artery M1  segment occlusion with poor opacification of the more distal right MCA branches/poor collateral flow. CTA neck 7/13 negative for acute occlusions. Pt given tenecteplase 7/13 at 13:11. Noted to be agitated and was subsequently intubated given need for procedure.   * Subsequently transferred to [facility] 7/13/2022 where she underwent above procedure.   7/14: Extubated. Head CT 7/14 showed evolution of acute infarct involving the R MCA distribution with increased swelling, cortical effacement, and new mild right-to-left midline shift; questionable hypoattenuation involving the bilateral occipital lobes which could be artifactual.   * Additional stroke workup pursued this stay -- echo 7/14 showed EF 50%, grade 1 diastolic dysfunction   * Started ASA and atorvastatin per stroke team.   * Repeat head CT 7/15 showed evolving R MCA stroke with areas of edema, overall stable.  -- conts on full dose ASA and statin  -- goal SBP <140/90  -- 30d cardiac event monitor at discharge  -- cont Seroquel 25mg HS, 12.5mg at dinner and 12.5mg po q6h prn  (for agitation/restlessness)  -- cont PT/OT, plan placement given ongoing therapy and wound vac needs  -- mgmt of dysphagia as below  -- will need to follow up with [facility] in 6-8 wks (due beginning of September)     Dysphagia due to CVA  * Seen by SLP and noted with dysphagia. Required some intermittent fluid boluses.  -- cont mechanical soft diet per SLP recs     Dyslipidemia  * FLP this stay showed tot cholest 163, HDL 47, LDL 91, .   * Started on atorvastatin 40 mg daily     Volume overload dt diastolic CHF exacerbation: Improved  Essential hypertension  * Not on/needing meds PTA.   * As above, echo this stay showed EF 50% with grade I diastolic dysfunction; RV not well visualized but appeared mild-moderately dilated with global systolic function probably mildly reduced.   * BPs were initially soft this stay and required IVFs.   * BPs improved, ultimately developed pedal edema required IV Lasix   * Started on lisinopril this stay  -- cont lisinopril 10mg daily  -- cont Lasix 40mg PO lasix in AM, 20mg po in afternoon     Prolonged QTc   * EKG on 7/13 showed QTc 494.   * Repeat EKG on 7/30 (while on Levaquin) showed QTc table at 475.   -- monitoring on telemetry this stay     Chronic bilateral LE edema with chronic venous stasis dermatitis and chronic skin changes  Hx of LLE cellulitis  * Was hospitalized in 11/2021 for sepsis dt to pneumonia and LLE cellulitis.   * During this stay, BLE noted to be patty and edematous, no unilateral leg swelling appreciated; LLE had patchy areas of erythema, no fluctuance, no painful areas with palpation; legs warm to touch. Lymphedema consulted.   -- cont LE elevation, lymphedema wraps  -- diuresis as above     Lower back/R flank wound/abscess, suspect dt pressure type injury, s/p surgical debridement on 7/30/22  OA  Hx of bilateral hip replacements   Hx of chronic neck and back pain  * Pt with significant back pain early on in hospital stay. Was requiring IV  hydromorphone.  Dose had to be decreased dt somnolence.  * MRI L-spine in 2018 showed multilevel degenerative changes with mild central stenosis. Patient has chronic back pain, reports having it over the last 2 years.   * During this stay, patient was noted with 5-6cm by 2-3cm wound with swelling/fluid/blistering in a fold of her lower back, did not appear infected; this seemed to be the source of her pain. Padded dressing placed and WOC RN ordered. Pain initially improved.   * Was placed on course of Augmentin on 7/24.   * On 7/26, was re-evaluated by WOC RN. Wound appeared more erythematous and purulence expressed. Worsening with shear stress from lift. Procal <0.05, WBC WNL. Abx changed to IV clindamycin.  * Wound cultures obtained. On 7/28, wound grew proteus and staph aureus (MRSA neg). US neg for abscess.   * Lost IV access on 7/28 so abx were changed to oral clindamycin and oral levaquin. IV access re-established but was continued on oral abx.  * General surgery consulted, ultimately underwent excisional debridement of R flank abscess in OR on 7/30. Intraop cultures showed polymicrobial growth with proteus mirabilis x2 strains (both pan-sensitive), corynebacterium striatum and enterococcus faecalis.  * Wound vac placed per general surgery on 8/2  * ID consulted on 8/2 and abx narrowed to Augmentin alone, recommended 5-7d of treatment  -- completed 5 day augmentin course on 8/7  -- routine postop cares per gen surgery   -- wound vac mgmt per WOC RN, MWF changes  -- prns available for pain  -- cont regular repositioning     Suspected meningioma left parietal region, incidentally seen on CT  * Head CT 7/13 showed a calcified extra-axial mass overlying the left parietal region measuring 1.4 cm, potentially representing a meningioma.  * Will need serial monitoring OP after discharge.      Anemia, suspect dilutional component  * Hgb normal on admit. Hgb 11.5 on 7/16. No overt clinical signs of major bleeding.  *  Hgb now stable at 12-13  -- monitor labs periodically      Intertriginous dermatitis  * Chronic and stable, cont clotrimazole powder     Constipation  * Continue sched bowel regimen     Hypokalemia  Hypophosphatemia  * Potassium and phosphorus low at times this hospitalization. Treated with replacement.  - start daily 20meq KCl replacement on 8/9     Morbid obesity  * BMI 59 this stay. Recommend aggressive dietary and lifestyle modifications as condition improves     Moderate malnutrition in context of acute illness and chronic disease  * Nutritionist following. On modified diet as above.     COVID-19 vaccination status  --Not vaccinated. Family contemplating vaccine.    Clinically Significant Risk Factors Present on Admission                        DVT Prophylaxis: Enoxaparin (Lovenox) SQ  Code Status: Full Code     Expected Discharge Date: 08/10/2022    Discharge Delays: Placement - LTC  Financial counseling needed    Discharge Comments: LTC  Patient has wound vac       Melissa Howe, DO  Hospitalist Service  [facility]  Securely message with the Vocera Web Console (learn more here)  Text Page (7am - 6pm) via [employer] Paging/Directory      Interval History   Patient seen and examined. She is doing okay today. She was repositioned this morning and tolerated it well. Pain is better overall, she seems well controlled on oxycodone. Still with anxiety at times.  Later she was moved into the chair (lift and assist of 2). She has physical therapy later today, we emphasized that she needs to participate and should not refuse.   Spent 40 minutes with >50% time spent evaluating patient, discussing care plan with RN/SW. Also spent 20 minutes in room reviewing discharge care plan with SW, patient, patient's daughter Kaylynn and patient's partner Leo    -Data reviewed today: I reviewed all new labs and imaging results over the last 24 hours. I personally reviewed no images or EKG's today.    Physical  Exam   Temp: 97.5  F (36.4  C) Temp src: Oral BP: 113/58 Pulse: 74   Resp: 18 SpO2: 92 % O2 Device: None (Room air)    Vitals:    07/23/22 1102 07/28/22 1508 08/05/22 0926   Weight: (!) 174.6 kg (385 lb) (!) 159.2 kg (350 lb 14.4 oz) (!) 155.6 kg (343 lb)     Vital Signs with Ranges  Temp:  [97.5  F (36.4  C)-99.4  F (37.4  C)] 97.5  F (36.4  C)  Pulse:  [70-85] 74  Resp:  [16-18] 18  BP: (110-116)/(57-83) 113/58  SpO2:  [92 %-94 %] 92 %  I/O last 3 completed shifts:  In: 840 [P.O.:840]  Out: 300 [Urine:300]    Constitutional: Awake, alert, cooperative  Respiratory: Clear to auscultation bilaterally, no crackles or wheezing  Cardiovascular: Regular rate and rhythm, normal S1 and S2, and no murmur noted  GI: Normal bowel sounds, soft, non-distended, non-tender, obese  Skin/Integumen: No rashes, no cyanosis, +1 edema (edemawear in place)  Other: Wound vac right flank present    Medications     - MEDICATION INSTRUCTIONS -         acetaminophen  975 mg Oral Q8H     aspirin  325 mg Oral Daily     atorvastatin  40 mg Oral QPM     diclofenac  2 g Topical 4x Daily     enoxaparin ANTICOAGULANT  40 mg Subcutaneous Q12H     furosemide  20 mg Oral Daily at 4 pm     furosemide  40 mg Oral Daily     lactobacillus rhamnosus (GG)  1 capsule Oral BID     lisinopril  10 mg Oral Daily     miconazole   Topical BID     multivitamin, therapeutic  1 tablet Oral Daily     polyethylene glycol  17 g Oral Daily     [START ON 8/9/2022] potassium chloride  20 mEq Oral Daily     pramoxine   Topical TID     QUEtiapine  12.5 mg Oral Daily with supper     QUEtiapine  25 mg Oral At Bedtime     senna-docusate  1-2 tablet Oral BID       Data   Recent Labs   Lab 08/08/22  1000 08/08/22  0829 08/07/22  1545 08/07/22  0724 08/06/22  1634 08/06/22  0935 08/05/22  0748 08/04/22  1709 08/04/22  0709   WBC 7.4  --   --   --   --  7.0  --   --  7.1   HGB 12.9  --   --   --   --  13.4  --   --  12.7   MCV 89  --   --   --   --  89  --   --  88     --    --   --   --  408 430  --  396     --   --   --   --  138  --   --  140   POTASSIUM 3.4  --  4.3 3.3*   < > 3.3*  --    < > 3.3*   CHLORIDE 103  --   --   --   --  102  --   --  102   CO2 29  --   --   --   --  29  --   --  33*   BUN 20  --   --   --   --  16  --   --  18   CR 0.67  --   --   --   --  0.62 0.62  --  0.67   ANIONGAP 6  --   --   --   --  7  --   --  5   ASHIA 8.7  --   --   --   --  9.1  --   --  8.6   * 88  --   --   --  96  --   --  104*    < > = values in this interval not displayed.       No results found for this or any previous visit (from the past 24 hour(s)).

## 2022-08-09 NOTE — PLAN OF CARE
Goal Outcome Evaluation:        Patient alert, oriented to self, confused, forgetful. More calm this evening. Moves upper extremities, strength 4/5, lower extremities 3/5. Skin patty, Rx cream applied to legs. Scabs noted on legs, feet with cracked skin. Perineal area with rash, skin raw, barrier cream applied. Patient incontinent of bowel and bladder. Plan to go to LTC at discharge.

## 2022-08-09 NOTE — PROGRESS NOTES
Stable. Paranoid. Anxious. At end of shift. VSS. RA. Purewick in place. Refuses tele. Daughter visited tonight, very helpful with patient. Wound vac patent. Minimal intake at dinner. Plan pending placement to TCU.

## 2022-08-09 NOTE — PLAN OF CARE
Goal Outcome Evaluation:    Plan of Care Reviewed With: patient     Overall Patient Progress: no change    Outcome Evaluation: mechanical/soft diet with gelatein+. pt continues to eat poorly. encouraged PO intake with adequate protein for wound healing. updated supplement order, gelatein BID, ensure daily    Donna Ho RD, LD

## 2022-08-09 NOTE — PLAN OF CARE
"/55 (BP Location: Right arm)   Pulse 74   Temp 98.4  F (36.9  C) (Oral)   Resp 16   Ht 1.676 m (5' 6\")   Wt (!) 155.6 kg (343 lb)   SpO2 95%   BMI 55.36 kg/m      Patient is currently here with a right-sided cerebrovascular accident primarily affecting the middle cerebral artery. Patient is alert and oriented to self only. Patient is neurologically intact aside from confusion, generalized weakness and forgetfulness. VSS on RA. Patient is currently on a mechanically/dental soft diet with thin liquids. Takes pills whole or crushed in applesauce. Patient transfers and ambulates with an assist of two to three plus the utilization of a lift device. Patient is incontinent of both bowel and bladder, currently has an external catheter in position that was replaced and is displaying adequate urinary output. Patient's skin is intact and in good condition aside from edema, intertriginous dermatitis, as well as a wound on the patient's lower right flank. Denies pain. Patient is currently scoring green on the Aggression Stop Light Tool. Plan to continue to assess for any potential neurological changes. Discharge plans pending eventual placement in a long-term care facility.     "

## 2022-08-09 NOTE — PLAN OF CARE
"Alert to self, anxious and calls out at times. PRN Oxycodone for complaints of back pain. Wound vac intact and functioning. Purewick in place. Will continue to monitor.    /60   Pulse 72   Temp 98.7  F (37.1  C) (Oral)   Resp 14   Ht 1.676 m (5' 6\")   Wt (!) 155.6 kg (343 lb)   SpO2 94%   BMI 55.36 kg/m      "

## 2022-08-09 NOTE — PROGRESS NOTES
[facility]    Hospitalist Progress Note    Assessment & Plan   Cristy Bailey is a 77 year old female with a PMHx significant for morbid obesity, hypertension, and hyperlipidemia, who presented to [facility] 7/13/2022 with complete left-sided paralysis, left-sided facial droop, and difficulty speaking. CTA head remarkable for right MCA occlusion. She was given tenecteplase and subsequently transferred to [facility] 7/13/2022 for thrombectomy.      Acute right MCA ischemic stroke with edema and right to left midline shift  S/p tenecteplase and subsequent R MCA mechanical thrombectomy 7/13/22  * Presented to [facility] 7/13 with complete paralysis, left-sided facial droop, and aphasia. Code stroke initiated. Head CT 7/13 showed signs of an evolving R MCA infarct. CTA head 7/13 showed a right carotid terminus occlusion, right middle cerebral artery M1  segment occlusion with poor opacification of the more distal right MCA branches/poor collateral flow. CTA neck 7/13 negative for acute occlusions. Pt given tenecteplase 7/13 at 13:11. Noted to be agitated and was subsequently intubated given need for procedure.   * Subsequently transferred to [facility] 7/13/2022 where she underwent above procedure.   7/14: Extubated. Head CT 7/14 showed evolution of acute infarct involving the R MCA distribution with increased swelling, cortical effacement, and new mild right-to-left midline shift; questionable hypoattenuation involving the bilateral occipital lobes which could be artifactual.   * Additional stroke workup pursued this stay -- echo 7/14 showed EF 50%, grade 1 diastolic dysfunction   * Started ASA and atorvastatin per stroke team.   * Repeat head CT 7/15 showed evolving R MCA stroke with areas of edema, overall stable.  -- conts on full dose ASA and statin  -- goal SBP <140/90  -- 30d cardiac event monitor at discharge  -- cont Seroquel 25mg HS, 12.5mg at dinner and 12.5mg po q6h prn  (for agitation/restlessness)  -- cont PT/OT, plan placement given ongoing therapy and wound vac needs  -- mgmt of dysphagia as below  -- will need to follow up with [facility] in 6-8 wks (due beginning of September)     Dysphagia due to CVA  * Seen by SLP and noted with dysphagia. Required some intermittent fluid boluses.  -- cont mechanical soft diet per SLP recs     Dyslipidemia  * FLP this stay showed tot cholest 163, HDL 47, LDL 91, .   * Started on atorvastatin 40 mg daily     Volume overload dt diastolic CHF exacerbation: Improved  Essential hypertension  * Not on/needing meds PTA.   * As above, echo this stay showed EF 50% with grade I diastolic dysfunction; RV not well visualized but appeared mild-moderately dilated with global systolic function probably mildly reduced.   * BPs were initially soft this stay and required IVFs.   * BPs improved, ultimately developed pedal edema required IV Lasix   * Started on lisinopril this stay  -- cont lisinopril 10mg daily  -- cont Lasix BID (40mg po in AM, 20mg po in afternoon)     Prolonged QTc   * EKG on 7/13 showed QTc 494.   * Repeat EKG on 7/30 (while on Levaquin) showed QTc table at 475.   -- monitoring on telemetry this stay     Chronic bilateral LE edema with chronic venous stasis dermatitis and chronic skin changes  Hx of LLE cellulitis  * Was hospitalized in 11/2021 for sepsis dt to pneumonia and LLE cellulitis.   * During this stay, BLE noted to be patty and edematous, no unilateral leg swelling appreciated; LLE had patchy areas of erythema, no fluctuance, no painful areas with palpation; legs warm to touch. Lymphedema consulted.   -- cont LE elevation, lymphedema wraps  -- diuresis as above     Lower back/R flank wound/abscess, suspect dt pressure type injury, s/p surgical debridement on 7/30/22  OA  Hx of bilateral hip replacements   Hx of chronic neck and back pain  * Pt with significant back pain early on in hospital stay. Was requiring IV  hydromorphone.  Dose had to be decreased dt somnolence.  * MRI L-spine in 2018 showed multilevel degenerative changes with mild central stenosis. Patient has chronic back pain, reports having it over the last 2 years.   * During this stay, patient was noted with 5-6cm by 2-3cm wound with swelling/fluid/blistering in a fold of her lower back, did not appear infected; this seemed to be the source of her pain. Padded dressing placed and WOC RN ordered. Pain initially improved.   * Was placed on course of Augmentin on 7/24.   * On 7/26, was re-evaluated by WOC RN. Wound appeared more erythematous and purulence expressed. Worsening with shear stress from lift. Procal <0.05, WBC WNL. Abx changed to IV clindamycin.  * Wound cultures obtained. On 7/28, wound grew proteus and staph aureus (MRSA neg). US neg for abscess.   * Lost IV access on 7/28 so abx were changed to oral clindamycin and oral levaquin. IV access re-established but was continued on oral abx.  * General surgery consulted, ultimately underwent excisional debridement of R flank abscess in OR on 7/30. Intraop cultures showed polymicrobial growth with proteus mirabilis x2 strains (both pan-sensitive), corynebacterium striatum and enterococcus faecalis.  * Wound vac placed per general surgery on 8/2  * ID consulted on 8/2 and abx narrowed to Augmentin alone, completed an additional 5 days of treatment on 8/7.   -- routine postop cares per gen surgery   -- wound vac mgmt per WOC RN, MWF changes  -- prns available for pain  -- cont regular repositioning     Suspected meningioma left parietal region, incidentally seen on CT  * Head CT 7/13 showed a calcified extra-axial mass overlying the left parietal region measuring 1.4 cm, potentially representing a meningioma.  * Will need serial monitoring OP after discharge.      Anemia, suspect dilutional component  * Hgb normal on admit. Hgb 11.5 on 7/16. No overt clinical signs of major bleeding.  * Hgb now stable at  12-13  -- monitor labs periodically      Intertriginous dermatitis  * Chronic and stable, cont clotrimazole powder     Constipation  * Continue sched bowel regimen     Hypokalemia  Hypophosphatemia  * Potassium and phosphorus low at times this hospitalization. Treated with replacement.  -- started daily 20meq KCl replacement on 8/9     Morbid obesity  * BMI 59 this stay. Recommend aggressive dietary and lifestyle modifications as condition improves     Moderate malnutrition in context of acute illness and chronic disease  * Nutritionist following. On modified diet as above.      COVID-19 vaccination status  * Not vaccinated. Family contemplating vaccine.     FEN: no IVFs, lytes stable, diet as above  DVT Prophylaxis: Lovenox  Code Status: Full Code    Disposition: Discharge to LTC facility once placement found. SW following.     Darby Martin,     Interval History   Uneventful night. Seen this morning. Appear tired. Oriented to self and location but cannot tell me the year. Reports some leg pain. Appetite no great but denies n/v.     -Data reviewed today: I reviewed all new labs and imaging results over the last 24 hours. I personally reviewed no images or EKG's today.    Physical Exam   Temp: 98.4  F (36.9  C) Temp src: Oral BP: 127/55 Pulse: 74   Resp: 16 SpO2: 95 % O2 Device: None (Room air)    Vitals:    07/23/22 1102 07/28/22 1508 08/05/22 0926   Weight: (!) 174.6 kg (385 lb) (!) 159.2 kg (350 lb 14.4 oz) (!) 155.6 kg (343 lb)     Vital Signs with Ranges  Temp:  [97.5  F (36.4  C)-98.7  F (37.1  C)] 98.4  F (36.9  C)  Pulse:  [69-74] 74  Resp:  [14-18] 16  BP: (113-127)/(55-60) 127/55  SpO2:  [92 %-95 %] 95 %  I/O last 3 completed shifts:  In: 720 [P.O.:720]  Out: -     Constitutional: Resting comfortably, tired but oriented x2 (self/location) and answering questions appropriately, NAD  Respiratory: CTAB, no wheeze/rales/rhonchi, no increased work of breathing  Cardiovascular: HRRR, no MGR,  bilateral LE edema wear in place  GI: obese, S, NT, ND, +BS  Skin/Integumen: warm/dry, wound vac in place over R flank  Other:      Medications     - MEDICATION INSTRUCTIONS -         acetaminophen  975 mg Oral Q8H     aspirin  325 mg Oral Daily     atorvastatin  40 mg Oral QPM     diclofenac  2 g Topical 4x Daily     enoxaparin ANTICOAGULANT  40 mg Subcutaneous Q12H     furosemide  20 mg Oral Daily at 4 pm     furosemide  40 mg Oral Daily     lactobacillus rhamnosus (GG)  1 capsule Oral BID     lisinopril  10 mg Oral Daily     miconazole   Topical BID     multivitamin, therapeutic  1 tablet Oral Daily     polyethylene glycol  17 g Oral Daily     potassium chloride  20 mEq Oral Daily     pramoxine   Topical TID     QUEtiapine  12.5 mg Oral Daily with supper     QUEtiapine  25 mg Oral At Bedtime     senna-docusate  1-2 tablet Oral BID       Data   Recent Labs   Lab 08/08/22  1626 08/08/22  1000 08/08/22  0829 08/07/22  1545 08/06/22  1634 08/06/22  0935 08/05/22  0748 08/04/22  1709 08/04/22  0709   WBC  --  7.4  --   --   --  7.0  --   --  7.1   HGB  --  12.9  --   --   --  13.4  --   --  12.7   MCV  --  89  --   --   --  89  --   --  88   PLT  --  365  --   --   --  408 430  --  396   NA  --  138  --   --   --  138  --   --  140   POTASSIUM 4.0 3.4  --  4.3   < > 3.3*  --    < > 3.3*   CHLORIDE  --  103  --   --   --  102  --   --  102   CO2  --  29  --   --   --  29  --   --  33*   BUN  --  20  --   --   --  16  --   --  18   CR  --  0.67  --   --   --  0.62 0.62  --  0.67   ANIONGAP  --  6  --   --   --  7  --   --  5   ASHIA  --  8.7  --   --   --  9.1  --   --  8.6   GLC  --  104* 88  --   --  96  --   --  104*    < > = values in this interval not displayed.       No results found for this or any previous visit (from the past 24 hour(s)).

## 2022-08-09 NOTE — PROGRESS NOTES
"CLINICAL NUTRITION SERVICES - REASSESSMENT NOTE      RECOMMENDATIONS FOR MD/PROVIDER TO ORDER:   - consider enteral nutrition with continued poor PO intake since admit and significant weight loss, if within GOC   Recommendations Ordered by Registered Dietitian (RD):   - modified gelatein+ schedule --> BID with lunch and dinner  - ordered ensure enlive vanilla with breakfast  - nutrition education: discussed available oral nutrition supplements, discussed importance of supplement and meal intake. Encouraged PO intake with good sources of protein for wound healing.   Future/Additional Recommendations:   - monitor PO intake and supplement tolerance  - continue to encourage protein intake for wound healing   Malnutrition:   % Weight Loss:  > 5% in 1 month (severe malnutrition) - suspect some is fluid-related  % Intake:  </= 50% for >/= 5 days (severe malnutrition)  Subcutaneous Fat Loss:  Orbital region moderate depletion - visual  Muscle Loss:  Temporal region mild depletion and Clavicle bone region mild depletion - visual  Fluid Retention:  Mild to moderate generalized edema    Malnutrition Diagnosis: Severe malnutrition  In Context of:  Acute illness or injury  Chronic illness or disease       EVALUATION OF PROGRESS TOWARD GOALS   Diet:  Mechanical/Dental Soft   - Room Service w/ Assist   - gelatein+ TID with meals    Intake/Tolerance:  - per discussion with pt, pt reported she doesm't want to eat much because she is \"sick\" and complained of nausea. Pt complained of leg pain during visit and difficult to re-direct. Encouraged pt to try to eat her meals and supplements as they are good sources of kcal and protein for wound healing. Pt had 2 unopened gelatein+ in room during visit --> will reduce to BID. Pt willing to try vanilla ensure enlive.   - per chart review, pt has very poor appetite  - per nursing flow sheet, 0-75% intakes documented- generally 25-50%  - per health touch, pt receiving 3 meals + 3 supplements " daily    Barriers: decreased appetite, mentation and agitation    ASSESSED NUTRITION NEEDS:  Dosing Weight: 89.7 kg (adjusted)  Estimated Energy Needs: 8946-8068 kcals (15-20 Kcal/Kg)  Justification: maintenance r/t BMI  Estimated Protein Needs: 108-135 grams protein (1.2-1.5 g pro/Kg)  Justification: wound healing, obesity guidelines, preservation of lean body mass and increased needs r/t age  Estimated Fluid Needs: (1 mL/Kcal)  Justification: maintenance and per provider pending fluid status      NEW FINDINGS:   General: discharge to [facility] pending placement    Weight: loss of -14.4 kg since admit x 27 days => 8.5%: suspect fluid-related with some true weight loss    Medications: lasix, culturell, thera-vit, miralax, KCl packet, senna-docusate    GI: 1x BM today, 2x yesterday    Skin:  - WOC followin/8  Wound location: Right lower back in waist crease    Wound due to: Wound due to: Hospital Acquired Pressure injury  Stage: Unstageable , Following surgical debridement  Stage 3  Pressure Injury and Moisture Associated Skin Damage (MASD), suspect intertriginous pressure, appears to be deeper tissue damage within a crease, possible shear component from lift.  Wound history/plan of care: Pt frequently refusing repositioning and turning due to back pain.  Wound first noted  as a blister and maroon area, then deteriorated and needed I&D.  Pt needs 3-4 staff for repositioning and ceiling lift.  3 staff assisting WOC during dressing change .   Wound Location: Buttock  Wound due to: Blanchable erythema, not currently a pressure injury  Wound history/plan of care: Pt with large body habitus and difficulty with repositioning due to back pain. No changes  remains blanchable    - 8/3: per gen surg team, wound is healing well   - : wound vac placed      Previous Goals:   Patient will consume >/= 75% meals + supplements TID   Evaluation: Not met    Previous Nutrition Diagnosis:   Inadequate oral  intake related to related to prolonged admission, altered mental status, reduced appetite as evidenced by <75% intakes x20 day admission  Evaluation: No change    Increased nutrient needs (protein-micronutrients) r/t wound healing AEB assessed nutrition needs of > 100 grams protein/day, hospital acquired PI on lower back/R flank   Evaluation: No change      MALNUTRITION  % Weight Loss:  > 5% in 1 month (severe malnutrition) - suspect some is fluid-related  % Intake:  </= 50% for >/= 5 days (severe malnutrition)  Subcutaneous Fat Loss:  Orbital region moderate depletion - visual  Muscle Loss:  Temporal region mild depletion and Clavicle bone region mild depletion - visual  Fluid Retention:  Mild to moderate generalized edema    Malnutrition Diagnosis: Severe malnutrition  In Context of:  Acute illness or injury  Chronic illness or disease    CURRENT NUTRITION DIAGNOSIS  Inadequate oral intake related to related to prolonged admission, altered mental status, reduced appetite, increased needs secondary to wound healing as evidenced by <75% intakes x27 day admission    INTERVENTIONS  Recommendations / Nutrition Prescription  - modified gelatein+ schedule --> BID with lunch and dinner  - ordered ensure enlive vanilla with breakfast  - nutrition education: discussed available oral nutrition supplements, discussed importance of supplement and meal intake. Encouraged PO intake with good sources of protein for wound healing.    Implementation  Medical Food Supplement: see above    Goals  Patient to consume >75% of nutritionally adequate meals TID with supplements      MONITORING AND EVALUATION:  Progress towards goals will be monitored and evaluated per protocol and Practice Guidelines      Donna Ho RD, LD

## 2022-08-10 NOTE — PROGRESS NOTES
[facility]    Hospitalist Progress Note    Assessment & Plan   Cristy Bailey is a 77 year old female with a PMHx significant for morbid obesity, hypertension, and hyperlipidemia, who presented to [facility] 7/13/2022 with complete left-sided paralysis, left-sided facial droop, and difficulty speaking. CTA head remarkable for right MCA occlusion. She was given tenecteplase and subsequently transferred to [facility] 7/13/2022 for thrombectomy.      Acute right MCA ischemic stroke with edema and right to left midline shift  S/p tenecteplase and subsequent R MCA mechanical thrombectomy 7/13/22  * Presented to [facility] 7/13 with complete paralysis, left-sided facial droop, and aphasia. Code stroke initiated. Head CT 7/13 showed signs of an evolving R MCA infarct. CTA head 7/13 showed a right carotid terminus occlusion, right middle cerebral artery M1  segment occlusion with poor opacification of the more distal right MCA branches/poor collateral flow. CTA neck 7/13 negative for acute occlusions. Pt given tenecteplase 7/13 at 13:11. Noted to be agitated and was subsequently intubated given need for procedure.   * Subsequently transferred to [facility] 7/13/2022 where she underwent above procedure.   7/14: Extubated. Head CT 7/14 showed evolution of acute infarct involving the R MCA distribution with increased swelling, cortical effacement, and new mild right-to-left midline shift; questionable hypoattenuation involving the bilateral occipital lobes which could be artifactual.   * Additional stroke workup pursued this stay -- echo 7/14 showed EF 50%, grade 1 diastolic dysfunction   * Started ASA and atorvastatin per stroke team.   * Repeat head CT 7/15 showed evolving R MCA stroke with areas of edema, overall stable.  -- conts on full dose ASA and statin  -- goal SBP <140/90  -- 30d cardiac event monitor at discharge  -- cont Seroquel 25mg HS, 12.5mg at dinner and 12.5mg po q6h prn  (for agitation/restlessness)  -- cont PT/OT, plan placement given ongoing therapy and wound vac needs  -- mgmt of dysphagia as below  -- will need to follow up with [facility] in 6-8 wks (due beginning of September)     Dysphagia due to CVA  * Seen by SLP and noted with dysphagia. Required some intermittent fluid boluses.  -- cont mechanical soft diet per SLP recs     Dyslipidemia  * FLP this stay showed tot cholest 163, HDL 47, LDL 91, .   * Started on atorvastatin 40 mg daily     Volume overload dt diastolic CHF exacerbation: Improved  Essential hypertension  * Not on/needing meds PTA.   * As above, echo this stay showed EF 50% with grade I diastolic dysfunction; RV not well visualized but appeared mild-moderately dilated with global systolic function probably mildly reduced.   * BPs were initially soft this stay and required IVFs.   * BPs improved, ultimately developed pedal edema required IV Lasix   * Started on lisinopril this stay  -- cont lisinopril 10mg daily  -- cont Lasix BID (40mg po in AM, 20mg po in afternoon)     Prolonged QTc   * EKG on 7/13 showed QTc 494.   * Repeat EKG on 7/30 (while on Levaquin) showed QTc table at 475.   -- monitoring on telemetry this stay     Chronic bilateral LE edema with chronic venous stasis dermatitis and chronic skin changes  Hx of LLE cellulitis  * Was hospitalized in 11/2021 for sepsis dt to pneumonia and LLE cellulitis.   * During this stay, BLE noted to be patty and edematous, no unilateral leg swelling appreciated; LLE had patchy areas of erythema, no fluctuance, no painful areas with palpation; legs warm to touch. Lymphedema consulted.   -- cont LE elevation, lymphedema wraps  -- diuresis as above     Lower back/R flank wound/abscess, suspect dt pressure type injury, s/p surgical debridement on 7/30/22  OA  Hx of bilateral hip replacements   Hx of chronic neck and back pain  * Pt with significant back pain early on in hospital stay. Was requiring IV  hydromorphone.  Dose had to be decreased dt somnolence.  * MRI L-spine in 2018 showed multilevel degenerative changes with mild central stenosis. Patient has chronic back pain, reports having it over the last 2 years.   * During this stay, patient was noted with 5-6cm by 2-3cm wound with swelling/fluid/blistering in a fold of her lower back, did not appear infected; this seemed to be the source of her pain. Padded dressing placed and WOC RN ordered. Pain initially improved.   * Was placed on course of Augmentin on 7/24.   * On 7/26, was re-evaluated by WOC RN. Wound appeared more erythematous and purulence expressed. Worsening with shear stress from lift. Procal <0.05, WBC WNL. Abx changed to IV clindamycin.  * Wound cultures obtained. On 7/28, wound grew proteus and staph aureus (MRSA neg). US neg for abscess.   * Lost IV access on 7/28 so abx were changed to oral clindamycin and oral levaquin. IV access re-established but was continued on oral abx.  * General surgery consulted, ultimately underwent excisional debridement of R flank abscess in OR on 7/30. Intraop cultures showed polymicrobial growth with proteus mirabilis x2 strains (both pan-sensitive), corynebacterium striatum and enterococcus faecalis.  * Wound vac placed per general surgery on 8/2  * ID consulted on 8/2 and abx narrowed to Augmentin alone, completed an additional 5 days of treatment on 8/7.   -- routine postop cares per gen surgery   -- wound vac mgmt per WOC RN, MWF changes  -- prns available for pain  -- cont regular repositioning     Suspected meningioma left parietal region, incidentally seen on CT  * Head CT 7/13 showed a calcified extra-axial mass overlying the left parietal region measuring 1.4 cm, potentially representing a meningioma.  * Will need serial monitoring OP after discharge.      Anemia, suspect dilutional component  * Hgb normal on admit. Hgb 11.5 on 7/16. No overt clinical signs of major bleeding.  * Hgb now stable at  12-13  -- monitor labs periodically      Intertriginous dermatitis  * Chronic and stable, cont clotrimazole powder     Constipation  * Continue sched bowel regimen     Hypokalemia  Hypophosphatemia  * Potassium and phosphorus low at times this hospitalization. Treated with replacement.  -- started daily 20meq KCl replacement on 8/9     Morbid obesity  * BMI 59 this stay. Recommend aggressive dietary and lifestyle modifications as condition improves     Moderate malnutrition in context of acute illness and chronic disease  * Nutritionist following. On modified diet as above.      COVID-19 vaccination status  * Not vaccinated. Family contemplating vaccine.     FEN: no IVFs, lytes stable, diet as above  DVT Prophylaxis: Lovenox  Code Status: Full Code    Disposition: Discharge to LTC facility once placement found. SW following.     Darby Martin, DO    Interval History    Seen this morning. Lab at bedside and patient reports some pain with needle sticks. Oriented to self/location but could not tell me year. No other specific complaints. No cp/sob/cough, abd pain/n/v.     -Data reviewed today: I reviewed all new labs and imaging results over the last 24 hours. I personally reviewed no images or EKG's today.    Physical Exam   Temp: 97.3  F (36.3  C) Temp src: Oral BP: 121/54 Pulse: 73   Resp: 18 SpO2: 91 % O2 Device: None (Room air)    Vitals:    07/23/22 1102 07/28/22 1508 08/05/22 0926   Weight: (!) 174.6 kg (385 lb) (!) 159.2 kg (350 lb 14.4 oz) (!) 155.6 kg (343 lb)     Vital Signs with Ranges  Temp:  [97.3  F (36.3  C)-97.6  F (36.4  C)] 97.3  F (36.3  C)  Pulse:  [73-74] 73  Resp:  [18] 18  BP: (104-121)/(53-54) 121/54  SpO2:  [91 %-95 %] 91 %  I/O last 3 completed shifts:  In: 180 [P.O.:180]  Out: 1300 [Urine:1300]    Constitutional: Resting comfortably, tired but oriented x2 (self/location) and answering questions appropriately, NAD  Respiratory: CTAB, no wheeze/rales/rhonchi, no increased work of  breathing  Cardiovascular: HRRR, no MGR, mild bilateral LE edema to mid shins  GI: obese, S, NT, ND, +BS  Skin/Integumen: warm/dry, wound vac in place over R flank, chronic venous stasis changes in bilateral LEs  Other:      Medications     - MEDICATION INSTRUCTIONS -         acetaminophen  975 mg Oral Q8H     aspirin  325 mg Oral Daily     atorvastatin  40 mg Oral QPM     diclofenac  2 g Topical 4x Daily     enoxaparin ANTICOAGULANT  40 mg Subcutaneous Q12H     furosemide  20 mg Oral Daily at 4 pm     furosemide  40 mg Oral Daily     lactobacillus rhamnosus (GG)  1 capsule Oral BID     lisinopril  10 mg Oral Daily     miconazole   Topical BID     multivitamin, therapeutic  1 tablet Oral Daily     polyethylene glycol  17 g Oral Daily     potassium chloride  20 mEq Oral Daily     pramoxine   Topical TID     QUEtiapine  12.5 mg Oral Daily with supper     QUEtiapine  25 mg Oral At Bedtime     senna-docusate  1-2 tablet Oral BID       Data   Recent Labs   Lab 08/08/22  1626 08/08/22  1000 08/08/22  0829 08/07/22  1545 08/06/22  1634 08/06/22  0935 08/05/22  0748 08/04/22  1709 08/04/22  0709   WBC  --  7.4  --   --   --  7.0  --   --  7.1   HGB  --  12.9  --   --   --  13.4  --   --  12.7   MCV  --  89  --   --   --  89  --   --  88   PLT  --  365  --   --   --  408 430  --  396   NA  --  138  --   --   --  138  --   --  140   POTASSIUM 4.0 3.4  --  4.3   < > 3.3*  --    < > 3.3*   CHLORIDE  --  103  --   --   --  102  --   --  102   CO2  --  29  --   --   --  29  --   --  33*   BUN  --  20  --   --   --  16  --   --  18   CR  --  0.67  --   --   --  0.62 0.62  --  0.67   ANIONGAP  --  6  --   --   --  7  --   --  5   ASHIA  --  8.7  --   --   --  9.1  --   --  8.6   GLC  --  104* 88  --   --  96  --   --  104*    < > = values in this interval not displayed.       No results found for this or any previous visit (from the past 24 hour(s)).

## 2022-08-10 NOTE — PLAN OF CARE
[id number]    Alert. Orientated to self, forgetful, anxious at times.  A2 lift, particular with positioning, sometimes refuses.  Mech soft diet, takes pills crushed with applesauce.  Incont, purewick in place, voiding adequately.  Tele NSR with prolonged QT.  Wound vac R hip, WOC following.  Discharge pending.

## 2022-08-10 NOTE — PLAN OF CARE
Goal Outcome Evaluation:    [id number]     Alert. Orientated to self and place.  Disoriented to time and situation, forgetful, anxious at times.  Assist of 3-4 with lift during repositioning.  Mech soft diet, takes pills with applesauce.  Incontinent, purewick in place, voiding adequately. Wound vac R hip changed today by WOC RN.  Received prn Oxycodone, Atarax and scheduled Tylenol for pain.  Discharge pending.

## 2022-08-10 NOTE — PROGRESS NOTES
[facility]  WO Nurse Inpatient Assessment     Consulted for: vac dressing changes to Right lower back wound (Stage 3 HAPI)    8/10 back wound improving greatly with vac therapy, no active s/s infection.  Dressing changes remain very painful and require several staff for pt positioning.  Wound will likely benefit from vac for another couple of weeks.     Pt hx per chart: Cristy Bailey is a 77 year old female with a PMHx significant for morbid obesity, hypertension (not on/needing meds), and hyperlipidemia (not on/needing meds), who presented to [facility] 7/13/2022 with complete left-sided paralysis, left-sided facial droop, and difficulty speaking. CTA head remarkable for right MCA occlusion. She was given tenecteplase and subsequently transferred to [facility] 7/13/2022 for thrombectomy.     Areas Assessed:      Areas visualized during today's visit: back, buttocks    Wound location: Right lower back in waist crease      8-10-22        8-2-22        7-28-22 (pre-debridement)      Wound due to: Wound due to: Hospital Acquired Pressure injury  Stage: Unstageable 7/28, Following surgical debridement 7/30 Stage 3  Pressure Injury and Moisture Associated Skin Damage (MASD), suspect intertriginous pressure, appears to be deeper tissue damage within a crease, possible shear component from lift.  Wound history/plan of care: Pt frequently refusing repositioning and turning due to back pain.  Wound first noted 7/16 as a blister and maroon area, then deteriorated and needed I&D.  Pt needs 3-4 staff for repositioning and ceiling lift.      Surgical date: 7-30-22 Dr. Black     Date Negative Pressure Wound Therapy initiated: 8-2-22     Interventions in place: repositioning, specialty surface in use, moisture/incontinence management and offloading    Is patient s nutritional status compromised? yes   a. If yes, what interventions are in place? Protein supplements    Reason for  initiating vac therapy? Presence of co-morbidities, High risk of infections and Need for accelerated granulation tissue    Which?of?the?following?co-morbidities?apply? Obesity  a. If diabetic is patient on a diabetic management program? N/A     Is osteomyelitis present in wound? no  a.  If yes what treatments are in place? N/A    Wound base: red moist granular tissue     Palpation of the wound bed: normal       Drainage: moderate      Description of drainage: serosanguinous      Measurements (length x width x depth, in cm) approx 3 x 13 x 2cm, somewhat positional       Tunneling N/A      Undermining approx 1.5cm near 8-9 o'clock   Periwound skin: scattered small areas of necrosis along wound edges that are evolving and currently moist red and tan; general mild erythema and maceration; small denudement in central back crease      Color: pink       Temperature: normal to warm and sweaty  Odor: minimal  Pain: moderate to briefly severe at times  Pain intervention prior to dressing change: premedicated; distraction techniques very effective  Treatment goal: Heal  and Increase granulation  STATUS: improving   Supplies ordered: at bedside    Number of foam pieces removed from a wound (excluding foam for bridge) : 3 GranuFoam Black   Verified this matched the number of foam pieces applied last dressing change: Yes   Number of foam pieces packed into wound (excluding foam for bridge) : 3 GranuFoam Black       Wound Location: Buttock    Date of last photo: 8-10-22      Wound due to: Blanchable erythema, not currently a pressure injury  Wound history/plan of care: Pt with large body habitus and difficulty with repositioning due to back pain.    Wound base: 100 % blanchable      Palpation of the wound bed: normal      Drainage: none     Description of drainage: none     Measurements (length x width x depth, in cm): 1.5  x 5  x  0 cm      Tunneling: N/A     Undermining: N/A  Periwound skin: Intact and hyperpigmentation, venous  congestion and chronic skin changes      Color: pink, purple and brown      Temperature: normal   Odor: none  Pain: denies , none  Pain interventions prior to dressing change: N/A  Treatment goal: Protection  STATUS: improving  Supplies ordered: on unit      - Buttock intact at this time and erythema most likely due to linens underneath patient.  Mild denudement along inner buttocks and perineal/labia area.  Dressings would add additional material at this time and are not necessary. Continue to encourage PIP measures    -BL legs dry very flaky/scaly skin with hemosiderin staining. Legs occasionally weeping from areas that appear to spontaneously open and close. EdemaWear in room      Treatment Plan:     Negative pressure wound therapy plan:  Wound location: right lower back   Change Days: Mon/Wed/Fri by WOC RN    Supplies (including all accessories) used: medium Black foam and ostomy ring x 1; Aquacel Ag applied to periwound breakdown   Cleanse with Vashe prior to replacing VAC    Suction setting: -125   Methods used: Window paned all periwound skin with vac drape prior to applying sponge, Bridged trac pad off bony prominences and Placed barrier ring into periwound creases to improve seal    Staff RN to assess integrity of dressing and ensure suction is set at appropriate level every shift.   Date canister. Chart canister output every shift. Change cannister weekly and PRN if full/occluded     Remove foam dressing and replace with BID normal saline or Vashe moist gauze dressing if:   -a dressing failure which cannot be repaired within 2 hours   -patient is discharging to home without a home pump   -patient is discharging to a facility outside the local area      The hospital VAC pump is not to be discharged with the patient.?Ensure to disconnect patient from machine prior to discharge. Then,    - If a home [employer] VAC pump has been delivered, connect home cannister to dressing tubing then connect cannister to home pump  "and turn on machine    - If transferring to a nearby facility with a [employer] vac, can disconnect and clamp tubing then cover end with glove so can be reconnected within 2 hours  \    Pannus: BID and PRN  1. Cleanse the area with Azra cleanse and protect and marga dry wipes/soft cloth.   2. Apply ostomy powder (#7639) on all reddened or denuded skin.   3. Apply nickel thick layer of Triad paste (#879011) to wound bed and thin layer over reddened areas   4. Ok to use Interdry AG to all other intact tissue. Ensure at least 2 inch tail is left outside of body to wick moisture. Single layer pillow case can also be used to help keep pannus dry if interdry is not staying well.   **If complete removal of paste is necessary use baby oil/mineral oil (Located in Pharmacy) and soft wash cloth.    Perineal cares: every shift and prn:  1.  Remove PureWick if using and cleanse all areas thoroughly with generous amount Azra perineal lotion and dry cloths.  Spray the Azra directly onto the skin.   2.  Antifungal powder to folds  3.  Reposition PureWick with every turn/repo  4.  Pt should not be up in chair more than about 2 hrs at a time for skin health    Bilateral lower legs: Daily:  1.  Apply generous amount Azra perineal lotion to legs and feet, and rub it into the skin.  Let this sit for a few minutes, then wipe off excess with dry cloths, including cleaning in between toes.  2.  Apply Sween 24 cream to legs and feet.   3.  Apply EdemaWear as per PT orders.  Handwash when soiled, hang dry.  (Yellow stripe - medium # [id number])  4.  Elevate legs with pillows, float heels.    Pressure Injury Prevention (PIP) Plan:  If patient is declining pressure injury prevention interventions: Explore reason why and address patient's concerns, Educate on pressure injury risk and prevention intervention(s), If patient is still declining, document \"informed refusal\"  and Ensure Care team is aware ( provider, charge nurse, etc)  Mattress: Bariatric " low air loss  HOB: Maintain at or below 30 degrees, unless contraindicated  Repositioning in bed: Every 1-2 hours , Left/right positioning; avoid supine and Raise foot of bed prior to raising head of bed, to reduce patient sliding down (shear)  Heels: Keep elevated off mattress and Pillows under calves  Protective Dressing: None  Positioning Equipment: None  Chair positioning: Chair cushion (#[id number])  and Assist patient to reposition hourly   If patient has a buttock pressure injury, or high risk for PI use chair cushion or SPS.  Moisture Management: Perineal cleansing /protection: Follow Incontinence Protocol, Avoid brief in bed and Clean and dry skin folds with bathing   Under Devices: Inspect skin under all medical devices during skin inspection  and Ensure tubes are stabilized without tension  Ask provider to discontinue device when no longer needed.    Orders: Reviewed and Updated    RECOMMEND PRIMARY TEAM ORDER: None, at this time  Education provided: importance of repositioning, plan of care, Infection prevention  and Off-loading pressure  Discussed plan of care with: Patient and Nurse  WOC nurse follow-up plan: Monday/WednesdayFriday  Notify WOC if wound(s) deteriorate.  Nursing to notify the Provider(s) and re-consult the WOC Nurse if new skin concern.    DATA:     Current support surface: Bariatric Low air loss mattress    Containment of urine/stool: Incontinence Protocol and Incontinent pad in bed  BMI: Body mass index is 55.36 kg/m .   Active diet order: Orders Placed This Encounter      Advance Diet as Tolerated      Mechanical/Dental Soft Diet     Output: I/O last 3 completed shifts:  In: 180 [P.O.:180]  Out: 1300 [Urine:1300]     Labs:   Recent Labs   Lab 08/08/22  1000   HGB 12.9   WBC 7.4     Pressure injury risk assessment:   Sensory Perception: 3-->slightly limited  Moisture: 3-->occasionally moist  Activity: 2-->chairfast  Mobility: 2-->very limited  Nutrition: 2-->probably inadequate  Friction  and Shear: 2-->potential problem  Demetrio Score: 14    Talia Linda RN CWOCN   Dept. Pager: [phone]  Dept. Office Number: [phone]

## 2022-08-11 NOTE — PROGRESS NOTES
Care Management Follow Up    Length of Stay (days): 29    Expected Discharge Date:       Concerns to be Addressed:       Patient plan of care discussed at interdisciplinary rounds: Yes    Anticipated Discharge Disposition:LTC   Anticipated Discharge Services:    Anticipated Discharge DME:      Education Provided on the Discharge Plan:  yes  Patient/Family in Agreement with the Plan:  yes    Referrals Placed by CM/SW: referrals out   Private pay costs discussed:     Additional Information:  SW spoke with admissions at [facility]. Lemon Perez is in middle of some room changes currently but anticipate being able to free up an appropriate room for pt in next 1-2 days.    JOSE GUADALUPE Barakat  [facility]  Care Transitions  [phone]

## 2022-08-11 NOTE — PROGRESS NOTES
[facility]    Hospitalist Progress Note    Assessment & Plan   Cristy Bailey is a 77 year old female with a PMHx significant for morbid obesity, hypertension, and hyperlipidemia, who presented to [facility] 7/13/2022 with complete left-sided paralysis, left-sided facial droop, and difficulty speaking. CTA head remarkable for right MCA occlusion. She was given tenecteplase and subsequently transferred to [facility] 7/13/2022 for thrombectomy.      Acute right MCA ischemic stroke with edema and right to left midline shift  S/p tenecteplase and subsequent R MCA mechanical thrombectomy 7/13/22  * Presented to [facility] 7/13 with complete paralysis, left-sided facial droop, and aphasia. Code stroke initiated. Head CT 7/13 showed signs of an evolving R MCA infarct. CTA head 7/13 showed a right carotid terminus occlusion, right middle cerebral artery M1  segment occlusion with poor opacification of the more distal right MCA branches/poor collateral flow. CTA neck 7/13 negative for acute occlusions. Pt given tenecteplase 7/13 at 13:11. Noted to be agitated and was subsequently intubated given need for procedure.   * Subsequently transferred to [facility] 7/13/2022 where she underwent above procedure.   7/14: Extubated. Head CT 7/14 showed evolution of acute infarct involving the R MCA distribution with increased swelling, cortical effacement, and new mild right-to-left midline shift; questionable hypoattenuation involving the bilateral occipital lobes which could be artifactual.   * Additional stroke workup pursued this stay -- echo 7/14 showed EF 50%, grade 1 diastolic dysfunction   * Started ASA and atorvastatin per stroke team.   * Repeat head CT 7/15 showed evolving R MCA stroke with areas of edema, overall stable.  -- conts on full dose ASA and statin  -- goal SBP <140/90  -- 30d cardiac event monitor at discharge  -- cont Seroquel 25mg HS, 12.5mg at dinner and 12.5mg po q6h prn  (for agitation/restlessness)  -- cont PT/OT, plan placement given ongoing therapy and wound vac needs  -- mgmt of dysphagia as below  -- will need to follow up with [facility] in 6-8 wks (due beginning of September)     Dysphagia due to CVA  * Seen by SLP and noted with dysphagia. Required some intermittent fluid boluses.  -- cont mechanical soft diet per SLP recs     Dyslipidemia  * FLP this stay showed tot cholest 163, HDL 47, LDL 91, .   * Started on atorvastatin 40 mg daily     Volume overload dt diastolic CHF exacerbation: Improved  Essential hypertension  * Not on/needing meds PTA.   * As above, echo this stay showed EF 50% with grade I diastolic dysfunction; RV not well visualized but appeared mild-moderately dilated with global systolic function probably mildly reduced.   * BPs were initially soft this stay and required IVFs.   * BPs improved, ultimately developed pedal edema required IV Lasix   * Started on lisinopril this stay  -- cont lisinopril 10mg daily  -- cont Lasix BID (40mg po in AM, 20mg po in afternoon)     Prolonged QTc   * EKG on 7/13 showed QTc 494.   * Repeat EKG on 7/30 (while on Levaquin) showed QTc table at 475.   -- monitoring on telemetry this stay     Chronic bilateral LE edema with chronic venous stasis dermatitis and chronic skin changes  Hx of LLE cellulitis  * Was hospitalized in 11/2021 for sepsis dt to pneumonia and LLE cellulitis.   * During this stay, BLE noted to be patty and edematous, no unilateral leg swelling appreciated; LLE had patchy areas of erythema, no fluctuance, no painful areas with palpation; legs warm to touch. Lymphedema consulted.   -- cont LE elevation, lymphedema wraps  -- diuresis as above     Lower back/R flank wound/abscess, suspect dt pressure type injury, s/p surgical debridement on 7/30/22  OA  Hx of bilateral hip replacements   Hx of chronic neck and back pain  * Pt with significant back pain early on in hospital stay. Was requiring IV  hydromorphone.  Dose had to be decreased dt somnolence.  * MRI L-spine in 2018 showed multilevel degenerative changes with mild central stenosis. Patient has chronic back pain, reports having it over the last 2 years.   * During this stay, patient was noted with 5-6cm by 2-3cm wound with swelling/fluid/blistering in a fold of her lower back, did not appear infected; this seemed to be the source of her pain. Padded dressing placed and WOC RN ordered. Pain initially improved.   * Was placed on course of Augmentin on 7/24.   * On 7/26, was re-evaluated by WOC RN. Wound appeared more erythematous and purulence expressed. Worsening with shear stress from lift. Procal <0.05, WBC WNL. Abx changed to IV clindamycin.  * Wound cultures obtained. On 7/28, wound grew proteus and staph aureus (MRSA neg). US neg for abscess.   * Lost IV access on 7/28 so abx were changed to oral clindamycin and oral levaquin. IV access re-established but was continued on oral abx.  * General surgery consulted, ultimately underwent excisional debridement of R flank abscess in OR on 7/30. Intraop cultures showed polymicrobial growth with proteus mirabilis x2 strains (both pan-sensitive), corynebacterium striatum and enterococcus faecalis.  * Wound vac placed per general surgery on 8/2  * ID consulted on 8/2 and abx narrowed to Augmentin alone, completed an additional 5 days of treatment on 8/7.   -- routine postop cares per gen surgery   -- wound vac mgmt per WOC RN, MWF changes  -- prns available for pain  -- cont regular repositioning     Suspected meningioma left parietal region, incidentally seen on CT  * Head CT 7/13 showed a calcified extra-axial mass overlying the left parietal region measuring 1.4 cm, potentially representing a meningioma.  * Will need serial monitoring OP after discharge.      Anemia, suspect dilutional component  * Hgb normal on admit. Hgb 11.5 on 7/16. No overt clinical signs of major bleeding.  * Hgb now stable at  12-13  -- monitor labs periodically      Intertriginous dermatitis  * Chronic and stable, cont clotrimazole powder     Constipation  * Continue sched bowel regimen     Hypokalemia  Hypophosphatemia  * Potassium and phosphorus low at times this hospitalization. Treated with replacement.  -- started daily 20meq KCl replacement on 8/9     Morbid obesity  * BMI 59 this stay. Recommend aggressive dietary and lifestyle modifications as condition improves     Moderate malnutrition in context of acute illness and chronic disease  * Nutritionist following. On modified diet as above.      COVID-19 vaccination status  * Not vaccinated. Family contemplating vaccine.     FEN: no IVFs, lytes stable, diet as above  DVT Prophylaxis: Lovenox  Code Status: Full Code    Disposition: Discharge to LTC facility once placement found. SW following.     Darby Martin, DO    Interval History    Seen this morning. Reports some R flank pain. No cp/sob/cough. Appetite not great but no abd pain/n/v.     -Data reviewed today: I reviewed all new labs and imaging results over the last 24 hours. I personally reviewed no images or EKG's today.    Physical Exam   Temp: 97.5  F (36.4  C) Temp src: Oral BP: 110/49 Pulse: 76   Resp: 18 SpO2: 91 % O2 Device: None (Room air)    Vitals:    07/23/22 1102 07/28/22 1508 08/05/22 0926   Weight: (!) 174.6 kg (385 lb) (!) 159.2 kg (350 lb 14.4 oz) (!) 155.6 kg (343 lb)     Vital Signs with Ranges  Temp:  [97.5  F (36.4  C)-98.9  F (37.2  C)] 97.5  F (36.4  C)  Pulse:  [70-76] 76  Resp:  [18] 18  BP: (110-115)/(49-65) 110/49  SpO2:  [91 %-95 %] 91 %  I/O last 3 completed shifts:  In: 170 [P.O.:170]  Out: 300 [Urine:300]    Constitutional: Resting comfortably, tired but oriented x2 (self/location) and answering questions appropriately, NAD  Respiratory: CTAB, no wheeze/rales/rhonchi, no increased work of breathing  Cardiovascular: HRRR, no MGR, mild bilateral LE edema to mid shins  GI: obese, S, NT, ND,  +BS  Skin/Integumen: warm/dry, wound vac in place over R flank, chronic venous stasis changes in bilateral LEs  Other:      Medications     - MEDICATION INSTRUCTIONS -         acetaminophen  975 mg Oral Q8H     aspirin  325 mg Oral Daily     atorvastatin  40 mg Oral QPM     diclofenac  2 g Topical 4x Daily     enoxaparin ANTICOAGULANT  40 mg Subcutaneous Q12H     furosemide  20 mg Oral Daily at 4 pm     furosemide  40 mg Oral Daily     lactobacillus rhamnosus (GG)  1 capsule Oral BID     lisinopril  10 mg Oral Daily     miconazole   Topical BID     multivitamin, therapeutic  1 tablet Oral Daily     polyethylene glycol  17 g Oral Daily     potassium chloride  20 mEq Oral Daily     pramoxine   Topical TID     QUEtiapine  12.5 mg Oral Daily with supper     QUEtiapine  25 mg Oral At Bedtime     senna-docusate  1-2 tablet Oral BID       Data   Recent Labs   Lab 08/11/22  0836 08/10/22  0809 08/08/22  1626 08/08/22  1000 08/08/22  0829 08/06/22  1634 08/06/22  0935 08/05/22  0748   WBC  --   --   --  7.4  --   --  7.0  --    HGB  --   --   --  12.9  --   --  13.4  --    MCV  --   --   --  89  --   --  89  --      --   --  365  --   --  408 430   NA  --   --   --  138  --   --  138  --    POTASSIUM  --  3.5 4.0 3.4  --    < > 3.3*  --    CHLORIDE  --   --   --  103  --   --  102  --    CO2  --   --   --  29  --   --  29  --    BUN  --   --   --  20  --   --  16  --    CR  --   --   --  0.67  --   --  0.62 0.62   ANIONGAP  --   --   --  6  --   --  7  --    ASHIA  --   --   --  8.7  --   --  9.1  --    GLC  --   --   --  104* 88  --  96  --     < > = values in this interval not displayed.       No results found for this or any previous visit (from the past 24 hour(s)).

## 2022-08-11 NOTE — PROGRESS NOTES
Care Management Follow Up    Length of Stay (days): 29    Expected Discharge Date:       Concerns to be Addressed: LTC sylvia JOYCE application completed and sent into [facility].      Patient plan of care discussed at interdisciplinary rounds: Yes    Anticipated Discharge Disposition: LTC  Anticipated Discharge Services:    Anticipated Discharge DME:      Additional Information:  TONY called and left message with [facility] requesting update on bed availability. Return call requested.    JOSE GUADALUPE Barakat  [facility]  Care Transitions  [phone]

## 2022-08-12 NOTE — PROGRESS NOTES
[facility]    Hospitalist Progress Note      Assessment & Plan   Cristy Bailey is a 77 year old female with a PMHx significant for morbid obesity, hypertension, and hyperlipidemia, who presented to [facility] 7/13/2022 with complete left-sided paralysis, left-sided facial droop, and difficulty speaking. CTA head remarkable for right MCA occlusion. She was given tenecteplase and subsequently transferred to [facility] 7/13/2022 for thrombectomy.      Acute right MCA ischemic stroke with edema and right to left midline shift  S/p tenecteplase and subsequent R MCA mechanical thrombectomy 7/13/22  * Presented to [facility] 7/13 with complete paralysis, left-sided facial droop, and aphasia. Code stroke initiated. Head CT 7/13 showed signs of an evolving R MCA infarct. CTA head 7/13 showed a right carotid terminus occlusion, right middle cerebral artery M1  segment occlusion with poor opacification of the more distal right MCA branches/poor collateral flow. CTA neck 7/13 negative for acute occlusions. Pt given tenecteplase 7/13 at 13:11. Noted to be agitated and was subsequently intubated given need for procedure.   * Subsequently transferred to [facility] 7/13/2022 where she underwent above procedure.   7/14: Extubated. Head CT 7/14 showed evolution of acute infarct involving the R MCA distribution with increased swelling, cortical effacement, and new mild right-to-left midline shift; questionable hypoattenuation involving the bilateral occipital lobes which could be artifactual.   * Additional stroke workup pursued this stay -- echo 7/14 showed EF 50%, grade 1 diastolic dysfunction   * Started ASA and atorvastatin per stroke team.   * Repeat head CT 7/15 showed evolving R MCA stroke with areas of edema, overall stable.  -- conts on full dose ASA and statin  -- goal SBP <140/90  -- 30d cardiac event monitor at discharge  -- cont Seroquel 25mg HS, 12.5mg at dinner and 12.5mg po q6h prn  (for agitation/restlessness)  -- cont PT/OT, plan placement given ongoing therapy and wound vac needs  -- mgmt of dysphagia as below  -- will need to follow up with [facility] in 6-8 wks (due beginning of September)     Dysphagia due to CVA  * Seen by SLP and noted with dysphagia. Required some intermittent fluid boluses.  -- cont mechanical soft diet per SLP recs     Dyslipidemia  * FLP this stay showed tot cholest 163, HDL 47, LDL 91, .   * Started on atorvastatin 40 mg daily     Volume overload dt diastolic CHF exacerbation: Improved  Essential hypertension  * Not on/needing meds PTA.   * As above, echo this stay showed EF 50% with grade I diastolic dysfunction; RV not well visualized but appeared mild-moderately dilated with global systolic function probably mildly reduced.   * BPs were initially soft this stay and required IVFs.   * BPs improved, ultimately developed pedal edema required IV Lasix   * Started on lisinopril this stay  -- cont lisinopril 10mg daily  -- cont Lasix BID (40mg po in AM, 20mg po in afternoon)     Prolonged QTc   * EKG on 7/13 showed QTc 494.   * Repeat EKG on 7/30 (while on Levaquin) showed QTc table at 475.   -- monitoring on telemetry this stay     Chronic bilateral LE edema with chronic venous stasis dermatitis and chronic skin changes  Hx of LLE cellulitis  * Was hospitalized in 11/2021 for sepsis dt to pneumonia and LLE cellulitis.   * During this stay, BLE noted to be patty and edematous, no unilateral leg swelling appreciated; LLE had patchy areas of erythema, no fluctuance, no painful areas with palpation; legs warm to touch. Lymphedema consulted.   -- cont LE elevation, lymphedema wraps  -- diuresis as above     Lower back/R flank wound/abscess, suspect dt pressure type injury, s/p surgical debridement on 7/30/22  OA  Hx of bilateral hip replacements   Hx of chronic neck and back pain  * Pt with significant back pain early on in hospital stay. Was requiring IV  hydromorphone.  Dose had to be decreased dt somnolence.  * MRI L-spine in 2018 showed multilevel degenerative changes with mild central stenosis. Patient has chronic back pain, reports having it over the last 2 years.   * During this stay, patient was noted with 5-6cm by 2-3cm wound with swelling/fluid/blistering in a fold of her lower back, did not appear infected; this seemed to be the source of her pain. Padded dressing placed and WOC RN ordered. Pain initially improved.   * Was placed on course of Augmentin on 7/24.   * On 7/26, was re-evaluated by WOC RN. Wound appeared more erythematous and purulence expressed. Worsening with shear stress from lift. Procal <0.05, WBC WNL. Abx changed to IV clindamycin.  * Wound cultures obtained. On 7/28, wound grew proteus and staph aureus (MRSA neg). US neg for abscess.   * Lost IV access on 7/28 so abx were changed to oral clindamycin and oral levaquin. IV access re-established but was continued on oral abx.  * General surgery consulted, ultimately underwent excisional debridement of R flank abscess in OR on 7/30. Intraop cultures showed polymicrobial growth with proteus mirabilis x2 strains (both pan-sensitive), corynebacterium striatum and enterococcus faecalis.  * Wound vac placed per general surgery on 8/2  * ID consulted on 8/2 and abx narrowed to Augmentin alone, completed an additional 5 days of treatment on 8/7.   -- routine postop cares per gen surgery   -- wound vac mgmt per WOC RN, MWF changes  -- prns available for pain  -- cont regular repositioning     Suspected meningioma left parietal region, incidentally seen on CT  * Head CT 7/13 showed a calcified extra-axial mass overlying the left parietal region measuring 1.4 cm, potentially representing a meningioma.  * Will need serial monitoring OP after discharge.      Anemia, suspect dilutional component  * Hgb normal on admit. Hgb 11.5 on 7/16. No overt clinical signs of major bleeding.  * Hgb now stable at  12-13  -- monitor labs periodically      Intertriginous dermatitis  * Chronic and stable, cont clotrimazole powder     Constipation  * Continue sched bowel regimen     Hypokalemia  Hypophosphatemia  * Potassium and phosphorus low at times this hospitalization. Treated with replacement.  -- started daily 20meq KCl replacement on 8/9, K so far stable     Morbid obesity  * BMI 59 this stay. Recommend aggressive dietary and lifestyle modifications as condition improves     Moderate malnutrition in context of acute illness and chronic disease  * Nutritionist following. On modified diet as above.      COVID-19 vaccination status  * Not vaccinated. Family contemplating vaccine.    Clinically Significant Risk Factors Present on Admission                        DVT Prophylaxis: Enoxaparin (Lovenox) SQ  Code Status: Full Code   await placement    Melissa Howe DO  Hospitalist Service  [facility]  Securely message with the Vocera Web Console (learn more here)  Text Page (7am - 6pm) via [facility] Paging/Directory      Interval History   Patient seen and examined. No chest pain, shortness of breath. She has some discomfort on her right hand where a bruise is noted. She is a little chilly under one blanket, wants to sleep this morning. Does not yet have placement    -Data reviewed today: I reviewed all new labs and imaging results over the last 24 hours. I personally reviewed no images or EKG's today.    Physical Exam   Temp: 97.8  F (36.6  C) Temp src: Axillary BP: 101/45 Pulse: 72   Resp: 16 SpO2: 94 % O2 Device: None (Room air)    Vitals:    07/23/22 1102 07/28/22 1508 08/05/22 0926   Weight: (!) 174.6 kg (385 lb) (!) 159.2 kg (350 lb 14.4 oz) (!) 155.6 kg (343 lb)     Vital Signs with Ranges  Temp:  [97.5  F (36.4  C)-97.8  F (36.6  C)] 97.8  F (36.6  C)  Pulse:  [72-76] 72  Resp:  [16-20] 16  BP: (101-120)/(45-56) 101/45  SpO2:  [93 %-94 %] 94 %  I/O last 3 completed shifts:  In: 510 [P.O.:510]  Out:  -     Constitutional: Awake, alert, cooperative, no apparent distress  Respiratory: Clear to auscultation bilaterally, no crackles or wheezing  Cardiovascular: Regular rate and rhythm, normal S1 and S2, and no murmur noted  GI: Normal bowel sounds, soft, non-distended, non-tender  Skin/Integumen: No rashes, no cyanosis, trace +1 lower extremity edema with some chronic stasis changes  Other: Wound vac in place right flank    Medications     - MEDICATION INSTRUCTIONS -         acetaminophen  975 mg Oral Q8H     aspirin  325 mg Oral Daily     atorvastatin  40 mg Oral QPM     diclofenac  2 g Topical 4x Daily     enoxaparin ANTICOAGULANT  40 mg Subcutaneous Q12H     furosemide  20 mg Oral Daily at 4 pm     furosemide  40 mg Oral Daily     lactobacillus rhamnosus (GG)  1 capsule Oral BID     lisinopril  10 mg Oral Daily     miconazole   Topical BID     multivitamin, therapeutic  1 tablet Oral Daily     polyethylene glycol  17 g Oral Daily     potassium chloride  20 mEq Oral Daily     pramoxine   Topical TID     QUEtiapine  12.5 mg Oral Daily with supper     QUEtiapine  25 mg Oral At Bedtime     senna-docusate  1-2 tablet Oral BID       Data   Recent Labs   Lab 08/12/22  0741 08/11/22  0836 08/10/22  0809 08/08/22  1626 08/08/22  1000 08/08/22  0829 08/06/22  1634 08/06/22  0935   WBC  --   --   --   --  7.4  --   --  7.0   HGB  --   --   --   --  12.9  --   --  13.4   MCV  --   --   --   --  89  --   --  89   PLT  --  375  --   --  365  --   --  408   NA  --   --   --   --  138  --   --  138   POTASSIUM 3.7 3.5 3.5   < > 3.4  --    < > 3.3*   CHLORIDE  --   --   --   --  103  --   --  102   CO2  --   --   --   --  29  --   --  29   BUN  --   --   --   --  20  --   --  16   CR  --  0.72  --   --  0.67  --   --  0.62   ANIONGAP  --   --   --   --  6  --   --  7   ASHIA  --   --   --   --  8.7  --   --  9.1   GLC  --   --   --   --  104* 88  --  96    < > = values in this interval not displayed.       No results found for this  or any previous visit (from the past 24 hour(s)).

## 2022-08-12 NOTE — PROGRESS NOTES
[facility]  WO Nurse Inpatient Assessment     Consulted for: vac dressing changes to Right lower back wound (Stage 3 HAPI)    8/10 back wound improving greatly with vac therapy, no active s/s infection.  Dressing changes remain very painful and require several staff for pt positioning.  Wound will likely benefit from vac for another couple of weeks.     Pt hx per chart: Cristy Bailey is a 77 year old female with a PMHx significant for morbid obesity, hypertension (not on/needing meds), and hyperlipidemia (not on/needing meds), who presented to [facility] 7/13/2022 with complete left-sided paralysis, left-sided facial droop, and difficulty speaking. CTA head remarkable for right MCA occlusion. She was given tenecteplase and subsequently transferred to [facility] 7/13/2022 for thrombectomy.     Areas Assessed:      Areas visualized during today's visit: back, buttocks    Wound location: Right lower back in waist crease      8-10-22            8-2-22        7-28-22 (pre-debridement)      Wound due to: Wound due to: Hospital Acquired Pressure injury  Stage: Unstageable 7/28, Following surgical debridement 7/30 Stage 3  Pressure Injury and Moisture Associated Skin Damage (MASD), suspect intertriginous pressure, appears to be deeper tissue damage within a crease, possible shear component from lift.  Wound history/plan of care: Pt frequently refusing repositioning and turning due to back pain.  Wound first noted 7/16 as a blister and maroon area, then deteriorated and needed I&D.  Pt needs 3-4 staff for repositioning and ceiling lift.      Surgical date: 7-30-22 Dr. Black     Date Negative Pressure Wound Therapy initiated: 8-2-22     Interventions in place: repositioning, specialty surface in use, moisture/incontinence management and offloading    Is patient s nutritional status compromised? yes   a. If yes, what interventions are in place? Protein supplements    Reason for  initiating vac therapy? Presence of co-morbidities, High risk of infections and Need for accelerated granulation tissue    Which?of?the?following?co-morbidities?apply? Obesity  a. If diabetic is patient on a diabetic management program? N/A     Is osteomyelitis present in wound? no  a.  If yes what treatments are in place? N/A    Wound base: red moist granular tissue     Palpation of the wound bed: normal       Drainage: moderate      Description of drainage: serosanguinous      Measurements (length x width x depth, in cm) approx 3 x 13 x 2cm, somewhat positional       Tunneling N/A      Undermining approx 1-3cm near 8-10 o'clock   Periwound skin: scattered small areas of necrosis along wound edges that are evolving and currently moist red and tan; general mild erythema and maceration; small denudement in central back crease. Ruptured blisters at mid back just outside drape and at 2 O'Clock just outside drape (covered with aquacel to mid back and adaptic and mepilex to lateral back      Color: pink       Temperature: normal to warm and sweaty  Odor: minimal  Pain: moderate to briefly severe at times  Pain intervention prior to dressing change: premedicated; distraction techniques very effective  Treatment goal: Heal  and Increase granulation  STATUS: improving   Supplies ordered: at bedside    Number of foam pieces removed from a wound (excluding foam for bridge) : 3 GranuFoam Black   Verified this matched the number of foam pieces applied last dressing change: Yes   Number of foam pieces packed into wound (excluding foam for bridge) : 2 GranuFoam Black         - Buttock intact at this time and erythema most likely due to linens underneath patient.  Mild denudement along inner buttocks and perineal/labia area.  Dressings would add additional material at this time and are not necessary. Continue to encourage PIP measures    -BL legs dry very flaky/scaly skin with hemosiderin staining. Legs occasionally weeping from  areas that appear to spontaneously open and close. EdemaWear in room      Treatment Plan:     Negative pressure wound therapy plan:  Wound location: right lower back   Change Days: Mon/Wed/Fri by C RN    Supplies (including all accessories) used: medium Black foam and ostomy ring x 1; Aquacel Ag applied to periwound breakdown   Cleanse with Vashe prior to replacing VAC    Suction setting: -125   Methods used: Window paned all periwound skin with vac drape prior to applying sponge, Bridged trac pad off bony prominences and Placed barrier ring into periwound creases to improve seal    Staff RN to assess integrity of dressing and ensure suction is set at appropriate level every shift.   Date canister. Chart canister output every shift. Change cannister weekly and PRN if full/occluded     Remove foam dressing and replace with BID normal saline or Vashe moist gauze dressing if:   -a dressing failure which cannot be repaired within 2 hours   -patient is discharging to home without a home pump   -patient is discharging to a facility outside the local area      The hospital VAC pump is not to be discharged with the patient.?Ensure to disconnect patient from machine prior to discharge. Then,    - If a home [employer]CI VAC pump has been delivered, connect home cannister to dressing tubing then connect cannister to home pump and turn on machine    - If transferring to a nearby facility with a [employer] vac, can disconnect and clamp tubing then cover end with glove so can be reconnected within 2 hours  \    Pannus: BID and PRN  1. Cleanse the area with Azra cleanse and protect and marga dry wipes/soft cloth.   2. Apply ostomy powder (#8339) on all reddened or denuded skin.   3. Apply nickel thick layer of Triad paste (#881325) to wound bed and thin layer over reddened areas   4. Ok to use Interdry AG to all other intact tissue. Ensure at least 2 inch tail is left outside of body to wick moisture. Single layer pillow case can also be used  "to help keep pannus dry if interdry is not staying well.   **If complete removal of paste is necessary use baby oil/mineral oil (Located in Pharmacy) and soft wash cloth.    Perineal cares: every shift and prn:  1.  Remove PureWick if using and cleanse all areas thoroughly with generous amount Azra perineal lotion and dry cloths.  Spray the Azra directly onto the skin.   2.  Antifungal powder to folds  3.  Reposition PureWick with every turn/repo  4.  Pt should not be up in chair more than about 2 hrs at a time for skin health    Bilateral lower legs: Daily:  1.  Apply generous amount Azra perineal lotion to legs and feet, and rub it into the skin.  Let this sit for a few minutes, then wipe off excess with dry cloths, including cleaning in between toes.  2.  Apply Sween 24 cream to legs and feet.   3.  Apply EdemaWear as per PT orders.  Handwash when soiled, hang dry.  (Yellow stripe - medium # [id number])  4.  Elevate legs with pillows, float heels.    Pressure Injury Prevention (PIP) Plan:  If patient is declining pressure injury prevention interventions: Explore reason why and address patient's concerns, Educate on pressure injury risk and prevention intervention(s), If patient is still declining, document \"informed refusal\"  and Ensure Care team is aware ( provider, charge nurse, etc)  Mattress: Bariatric low air loss  HOB: Maintain at or below 30 degrees, unless contraindicated  Repositioning in bed: Every 1-2 hours , Left/right positioning; avoid supine and Raise foot of bed prior to raising head of bed, to reduce patient sliding down (shear)  Heels: Keep elevated off mattress and Pillows under calves  Protective Dressing: None  Positioning Equipment: None  Chair positioning: Chair cushion (#[id number])  and Assist patient to reposition hourly   If patient has a buttock pressure injury, or high risk for PI use chair cushion or SPS.  Moisture Management: Perineal cleansing /protection: Follow Incontinence Protocol, " Avoid brief in bed and Clean and dry skin folds with bathing   Under Devices: Inspect skin under all medical devices during skin inspection  and Ensure tubes are stabilized without tension  Ask provider to discontinue device when no longer needed.    Orders: Reviewed    RECOMMEND PRIMARY TEAM ORDER: None, at this time  Education provided: importance of repositioning, plan of care, Infection prevention  and Off-loading pressure  Discussed plan of care with: Patient, Family and Nurse  WOC nurse follow-up plan: Monday/WednesdayFriday  Notify WOC if wound(s) deteriorate.  Nursing to notify the Provider(s) and re-consult the WOC Nurse if new skin concern.    DATA:     Current support surface: Bariatric Low air loss mattress    Containment of urine/stool: Incontinence Protocol and Incontinent pad in bed  BMI: Body mass index is 55.36 kg/m .   Active diet order: Orders Placed This Encounter      Advance Diet as Tolerated      Mechanical/Dental Soft Diet     Output: I/O last 3 completed shifts:  In: 510 [P.O.:510]  Out: -      Labs:   Recent Labs   Lab 08/08/22  1000   HGB 12.9   WBC 7.4     Pressure injury risk assessment:   Sensory Perception: 3-->slightly limited  Moisture: 3-->occasionally moist  Activity: 2-->chairfast  Mobility: 2-->very limited  Nutrition: 2-->probably inadequate  Friction and Shear: 1-->problem  Demetrio Score: 13    Telly Meyer RN CWOCN   Dept. Pager: [phone]  Dept. Office Number: [phone]

## 2022-08-12 NOTE — PLAN OF CARE
"Patient alert to self, VSS, Can be very anxious, Report pain in the back where wound vac attach is. Oxycodone given, reassurance, emotional support & reposition provided.   Refused to eat/drink her food even when  tried.    \" it's probably good for her if she does not eat, so she lose some weight\".   Perineals care provided and wound care per order.   Patient supposed to be on telemetry monitoring, refused to be in it, education provided.    1600 tried to attach the tele again, within 2 minutes patient already pulled out.   Refused to take her pills for the evening, and refused to eat any of the food. Always screamed for help, reporting of pain but refused to take a pill and sensitive to touch.     "

## 2022-08-12 NOTE — PLAN OF CARE
Plan of Care Reviewed With: patient   Pt here with R-sided CVA. A&O to self only; reorientation provided; anxious/fearful and doesn't like to be alone in the room; emotional support/reassurance provided. Otherwise, neuros stable/no new changes this shift. VSS. C/o back/generalized pain; oxycodone, tylenol, Voltaren gel provided. Wound vac on back at 125 pressure setting/ intact. Poor appetite; incontinent of urine. Refuses repositioning most of the time; Up to a recliner chair today with PT; plan for discharge to LTC in progress pending bed availability. Will continue to monitor.

## 2022-08-13 NOTE — PROGRESS NOTES
[facility]    Hospitalist Progress Note      Assessment & Plan   Cristy Bailey is a 77 year old female with a PMHx significant for morbid obesity, hypertension, and hyperlipidemia, who presented to [facility] 7/13/2022 with complete left-sided paralysis, left-sided facial droop, and difficulty speaking. CTA head remarkable for right MCA occlusion. She was given tenecteplase and subsequently transferred to [facility] 7/13/2022 for thrombectomy.      Acute right MCA ischemic stroke with edema and right to left midline shift  S/p tenecteplase and subsequent R MCA mechanical thrombectomy 7/13/22  * Presented to [facility] 7/13 with complete paralysis, left-sided facial droop, and aphasia. Code stroke initiated. Head CT 7/13 showed signs of an evolving R MCA infarct. CTA head 7/13 showed a right carotid terminus occlusion, right middle cerebral artery M1  segment occlusion with poor opacification of the more distal right MCA branches/poor collateral flow. CTA neck 7/13 negative for acute occlusions. Pt given tenecteplase 7/13 at 13:11. Noted to be agitated and was subsequently intubated given need for procedure.   * Subsequently transferred to [facility] 7/13/2022 where she underwent above procedure.   7/14: Extubated. Head CT 7/14 showed evolution of acute infarct involving the R MCA distribution with increased swelling, cortical effacement, and new mild right-to-left midline shift; questionable hypoattenuation involving the bilateral occipital lobes which could be artifactual.   * Additional stroke workup pursued this stay -- echo 7/14 showed EF 50%, grade 1 diastolic dysfunction   * Started ASA and atorvastatin per stroke team.   * Repeat head CT 7/15 showed evolving R MCA stroke with areas of edema, overall stable.  -- conts on full dose ASA and statin  -- goal SBP <140/90  -- 30d cardiac event monitor at discharge  -- cont Seroquel 25mg HS, 12.5mg at dinner and 12.5mg po q6h prn  (for agitation/restlessness)  -- cont PT/OT, plan placement given ongoing therapy and wound vac needs  -- mgmt of dysphagia as below  -- will need to follow up with [facility] in 6-8 wks (due beginning of September)     Dysphagia due to CVA  * Seen by SLP and noted with dysphagia. Required some intermittent fluid boluses.  -- cont mechanical soft diet per SLP recs     Dyslipidemia  * FLP this stay showed tot cholest 163, HDL 47, LDL 91, .   * Started on atorvastatin 40 mg daily     Volume overload dt diastolic CHF exacerbation: Improved  Essential hypertension  * Not on/needing meds PTA.   * As above, echo this stay showed EF 50% with grade I diastolic dysfunction; RV not well visualized but appeared mild-moderately dilated with global systolic function probably mildly reduced.   * BPs were initially soft this stay and required IVFs.   * BPs improved, ultimately developed pedal edema required IV Lasix   * Started on lisinopril this stay  -- cont lisinopril 10mg daily  -- cont Lasix BID (40mg po in AM, 20mg po in afternoon)     Prolonged QTc   * EKG on 7/13 showed QTc 494.   * Repeat EKG on 7/30 (while on Levaquin) showed QTc table at 475.   -- monitoring on telemetry this stay     Chronic bilateral LE edema with chronic venous stasis dermatitis and chronic skin changes  Hx of LLE cellulitis  * Was hospitalized in 11/2021 for sepsis dt to pneumonia and LLE cellulitis.   * During this stay, BLE noted to be patty and edematous, no unilateral leg swelling appreciated; LLE had patchy areas of erythema, no fluctuance, no painful areas with palpation; legs warm to touch. Lymphedema consulted.   -- cont LE elevation, lymphedema wraps  -- diuresis as above     Lower back/R flank wound/abscess, suspect dt pressure type injury, s/p surgical debridement on 7/30/22  OA  Hx of bilateral hip replacements   Hx of chronic neck and back pain  * Pt with significant back pain early on in hospital stay. Was requiring IV  hydromorphone.  Dose had to be decreased dt somnolence.  * MRI L-spine in 2018 showed multilevel degenerative changes with mild central stenosis. Patient has chronic back pain, reports having it over the last 2 years.   * During this stay, patient was noted with 5-6cm by 2-3cm wound with swelling/fluid/blistering in a fold of her lower back, did not appear infected; this seemed to be the source of her pain. Padded dressing placed and WOC RN ordered. Pain initially improved.   * Was placed on course of Augmentin on 7/24.   * On 7/26, was re-evaluated by WOC RN. Wound appeared more erythematous and purulence expressed. Worsening with shear stress from lift. Procal <0.05, WBC WNL. Abx changed to IV clindamycin.  * Wound cultures obtained. On 7/28, wound grew proteus and staph aureus (MRSA neg). US neg for abscess.   * Lost IV access on 7/28 so abx were changed to oral clindamycin and oral levaquin. IV access re-established but was continued on oral abx.  * General surgery consulted, ultimately underwent excisional debridement of R flank abscess in OR on 7/30. Intraop cultures showed polymicrobial growth with proteus mirabilis x2 strains (both pan-sensitive), corynebacterium striatum and enterococcus faecalis.  * Wound vac placed per general surgery on 8/2  * ID consulted on 8/2 and abx narrowed to Augmentin alone, completed an additional 5 days of treatment on 8/7.   -- routine postop cares per gen surgery   -- wound vac mgmt per WOC RN, MWF changes  -- prns available for pain  -- cont regular repositioning     Suspected meningioma left parietal region, incidentally seen on CT  * Head CT 7/13 showed a calcified extra-axial mass overlying the left parietal region measuring 1.4 cm, potentially representing a meningioma.  * Will need serial monitoring OP after discharge.      Anemia, suspect dilutional component  * Hgb normal on admit. Hgb 11.5 on 7/16. No overt clinical signs of major bleeding.  * Hgb now stable at  12-13  -- monitor labs periodically      Intertriginous dermatitis  * Chronic and stable, cont clotrimazole powder     Constipation  * Continue sched bowel regimen     Hypokalemia  Hypophosphatemia  * Potassium and phosphorus low at times this hospitalization. Treated with replacement.  -- started daily 20meq KCl replacement on 8/9, K so far stable     Morbid obesity  * BMI 59 this stay. Recommend aggressive dietary and lifestyle modifications as condition improves     Moderate malnutrition in context of acute illness and chronic disease  * Nutritionist following. On modified diet as above.      COVID-19 vaccination status  * Not vaccinated. Family contemplating vaccine.    Clinically Significant Risk Factors Present on Admission                        DVT Prophylaxis: Enoxaparin (Lovenox) SQ  Code Status: Full Code   await placement    Melissa Howe DO  Hospitalist Service  [facility]  Securely message with the Vocera Web Console (learn more here)  Text Page (7am - 6pm) via [facility] Paging/Directory      Interval History   Patient seen and examined. No chest pain, shortness of breath. Awake this morning, in good spirits. Breakfast arrived but doesn't have much of an appetite. Had vac changed yesterday. Still awaiting placement.    -Data reviewed today: I reviewed all new labs and imaging results over the last 24 hours. I personally reviewed no images or EKG's today.    Physical Exam   Temp: 98.1  F (36.7  C) Temp src: Oral BP: 111/59 Pulse: 73   Resp: 16 SpO2: 94 % O2 Device: None (Room air)    Vitals:    07/23/22 1102 07/28/22 1508 08/05/22 0926   Weight: (!) 174.6 kg (385 lb) (!) 159.2 kg (350 lb 14.4 oz) (!) 155.6 kg (343 lb)     Vital Signs with Ranges  Temp:  [98.1  F (36.7  C)-98.2  F (36.8  C)] 98.1  F (36.7  C)  Pulse:  [73] 73  Resp:  [16-18] 16  BP: (105-111)/(59-61) 111/59  SpO2:  [94 %] 94 %  I/O last 3 completed shifts:  In: 250 [P.O.:250]  Out: 675  [Urine:675]    Constitutional: Awake, alert, cooperative, no apparent distress  Respiratory: Clear to auscultation bilaterally, no crackles or wheezing  Cardiovascular: Regular rate and rhythm, normal S1 and S2, and no murmur noted  GI: Normal bowel sounds, soft, non-distended, non-tender  Skin/Integumen: No rashes, no cyanosis, +1 lower extremity edema with some chronic stasis changes  Other: Wound vac in place right flank    Medications     - MEDICATION INSTRUCTIONS -         acetaminophen  975 mg Oral Q8H     aspirin  325 mg Oral Daily     atorvastatin  40 mg Oral QPM     diclofenac  2 g Topical 4x Daily     enoxaparin ANTICOAGULANT  40 mg Subcutaneous Q12H     furosemide  20 mg Oral Daily at 4 pm     furosemide  40 mg Oral Daily     lactobacillus rhamnosus (GG)  1 capsule Oral BID     lisinopril  10 mg Oral Daily     miconazole   Topical BID     multivitamin, therapeutic  1 tablet Oral Daily     polyethylene glycol  17 g Oral Daily     potassium chloride  20 mEq Oral Daily     pramoxine   Topical TID     QUEtiapine  12.5 mg Oral Daily with supper     QUEtiapine  25 mg Oral At Bedtime     senna-docusate  1-2 tablet Oral BID       Data   Recent Labs   Lab 08/12/22  0741 08/11/22  0836 08/10/22  0809 08/08/22  1626 08/08/22  1000 08/08/22  0829 08/06/22  1634 08/06/22  0935   WBC  --   --   --   --  7.4  --   --  7.0   HGB  --   --   --   --  12.9  --   --  13.4   MCV  --   --   --   --  89  --   --  89   PLT  --  375  --   --  365  --   --  408   NA  --   --   --   --  138  --   --  138   POTASSIUM 3.7 3.5 3.5   < > 3.4  --    < > 3.3*   CHLORIDE  --   --   --   --  103  --   --  102   CO2  --   --   --   --  29  --   --  29   BUN  --   --   --   --  20  --   --  16   CR  --  0.72  --   --  0.67  --   --  0.62   ANIONGAP  --   --   --   --  6  --   --  7   ASHIA  --   --   --   --  8.7  --   --  9.1   GLC  --   --   --   --  104* 88  --  96    < > = values in this interval not displayed.       No results found for  this or any previous visit (from the past 24 hour(s)).

## 2022-08-13 NOTE — ED PROVIDER NOTES
History   Chief Complaint:  Fall       HPI   Cristy Bailey is a 76 year old female with history of hypertension, hyperlipidemia, DVT, and failure to thrive who presents after a fall. The patient slid out of her reclining chair this morning and her significant other was unable to lift her off of the floor, prompting him to call EMS. He reports that the patient has not taken her medications and has not left the house in 3 years. She uses a wheelchair to move around her home. Denies shortness of breath and chest pain. Patient has leg swelling at baseline.      Review of Systems   Respiratory: Negative for shortness of breath.    Cardiovascular: Positive for leg swelling (at baseline). Negative for chest pain.   All other systems reviewed and are negative.      Allergies:  The patient does not have any allergies    Medications:  The patient is currently on no regular medications.    Past Medical History:     Arthritis  Hypertension  Lyme disease  Hyperlipidemia  Abnormal glucose  Obesity  CRP elevated  Constipation  Myalgia and myositis  Avascular necrosis of bone of hip, right  Osteoarthritis  Acute thromboembolism of deep veins of left lower extremity  Long term current use of anticoagulant therapy  Idiopathic aseptic necrosis of right femur  DVT    Past Surgical History:    Arthroplasty, hip  Biopsy of uterus lining    Colonoscopy  I&D  Orthopedic surgery     Family History:    Father: cerebrovascular disease  Brother: alcohol/drug, CAD, MI    Social History:  Presents alone  Arrived via EMS    Physical Exam     Patient Vitals for the past 24 hrs:   BP Temp Temp src Pulse Resp SpO2 Weight   21 1615 92/55 -- -- -- -- 91 % --   21 1610 -- -- -- 97 -- 95 % --   21 1600 -- -- -- -- -- 93 % --   21 1550 -- -- -- -- -- 92 % --   21 1545 -- -- -- -- -- 94 % --   21 1543 -- -- -- -- -- -- (!) 163 kg (359 lb 5.6 oz)   21 1400 -- -- -- 93 -- 92 % --   21 1345 -- --  -- -- -- 94 % --   11/06/21 1300 124/67 -- -- 95 -- 92 % --   11/06/21 1215 120/81 -- -- 98 -- 94 % --   11/06/21 1213 120/81 99.1  F (37.3  C) Temporal 96 22 94 % --       Physical Exam  Vitals and nursing note reviewed.   Constitutional:       Appearance: She is obese.      Comments: Morbid obesity anxious debilitated.   HENT:      Head: Normocephalic.      Right Ear: Tympanic membrane normal.      Left Ear: Tympanic membrane normal.      Mouth/Throat:      Mouth: Mucous membranes are moist.   Eyes:      Pupils: Pupils are equal, round, and reactive to light.   Cardiovascular:      Rate and Rhythm: Normal rate and regular rhythm.   Pulmonary:      Effort: Pulmonary effort is normal.   Abdominal:      General: Abdomen is flat.   Musculoskeletal:      Comments: There is +3 pitting edema symmetric in bilateral lower extremities.  There is a area of confluent erythema over the right hip as pictured in the images below.  There is an inability to assess for decubitus as patient cannot lift or turn her body.  We are unable to lift her enough to be able to see her sacrum or her rectal region.   Neurological:      General: No focal deficit present.      Mental Status: She is alert.   Psychiatric:      Comments: Anxious admits to not leaving her house for 3 years.  Here with friend who cared for her over the last 3 years in her mobile home.               Emergency Department Course   ECG  ECG obtained at 1233, ECG read at 1240  Normal sinus rhythm  Inferior infarct, age undetermined  Abnormal ECG  Rate 98 bpm. NY interval 180 ms. QRS duration 88 ms. QT/QTc 342/436 ms. P-R-T axes 55 17 0.     Imaging:  CT Chest/Abdomen/Pelvis w Contrast   Preliminary Result   IMPRESSION:   1.  Mild consolidation versus atelectasis of the left lower lobe. Pneumonia at this position is possible.   2.  Cholelithiasis.   3.  Coronary artery calcifications.   4.  No acute abnormality otherwise seen with limitation as detailed above.        Report  per radiology    Laboratory:  CBC: WBC 30.8 (H), HGB 13.8,     CMP: Glucose 119 (H), Calcium 7.1 (L), Albumin 2.1 (L), Protein Total 6.4 (L), AST 55 (H) o/w WNL (Creatinine 0.57)    Prolactin: IN PROCESS    ABO/Rh type and screen: O Positive, Antibody: negative    Troponin (Collected 1254): <0.015    CK total: 104    Lactic acid (Collected 1357): 1.8    D Dimer (Collected 1253): 1.76 (H)    iStat Gases venous (1251): LACTW 2.4 (H), Bicarbonate 30 (H), O2 Sat 36 (L), pH 7.44 (H), pO2 21 (L)    Blood gas venous and oxyhgb: PO2: 22 (L), Oxyhgb: 42 (L), Base Excess 2.1 (H)     BNP: 718    CRP inflammation: 97.9 (H)    Blood Cultures x2: Pending    Asymptomatic COVID-19 Virus (Coronavirus) by PCR Nasopharyngeal swab: Negative    Emergency Department Course:  Reviewed:  I reviewed nursing notes, vitals, past medical history, Care Everywhere and [facility]    Assessments:  1208 I obtained history and examined the patient as noted above.    1302 I rechecked the patient and explained findings.    1358 I spoke with a family member.    1535 I rechecked and updated the patient.    Consults:  1552 I spoke with Dr. Ng, Hospitalist, who agrees to accept the patient.    Interventions:  1352 0.9% sodium chloride 3 flush    1352 Ativan 0.5 mg IV    1606 Dilaudid 0.5 mg IV    1606 Zosyn 4.5 g IV    1606 Zofran 4 mg IV    Disposition:  The patient was admitted to the hospital under the care of Dr. Ng.     Impression & Plan     CMS Diagnoses: None    Medical Decision Making:  Patient presents by EMS after slip and fall to ground.  On examination patient is very debilitated chronically ill has not seen a physician or left her mobile home in 3 years.  Her vital signs fortunately are relatively stable.  Patient has difficulty laying flat due to severe back pain.  Patient's lab work suggest sepsis with a low white count of 30,000.  Her blood pressure and heart rate are normal.  Clinical exam is suspicious for cellulitis on the  right.  Ultimately CT scan was assessed for PE due to patient's immobility and history of DVT remotely more than 3 years ago.  Ultimately recommend admission due to patient's disability.  Patient is unable to care for self at home.  Cannot rule out hip joint infection on the right but clinical exam is more suspicious for cellulitis will recommend admission IV antibiotics and consider social work consultation at end of medical work-up in the hospital blood cultures were drawn patient was admitted to Dr. Figueroa is in guarded condition due to patient's age 76 immobility and severe lack of self care.          Diagnosis:    ICD-10-CM    1. Cellulitis of right hip  L03.115    2. Generalized muscle weakness  M62.81    3. Unable to ambulate  R26.2        Discharge Medications:  New Prescriptions    No medications on file       Scribe Disclosure:  I, Gianluca Kam, am serving as a scribe at 12:08 PM on 11/6/2021 to document services personally performed by Ryley Stephens MD based on my observations and the provider's statements to me.            Ryley Villalpando MD  11/07/21 0922     within normal limits

## 2022-08-13 NOTE — PLAN OF CARE
Goal Outcome Evaluation:  VSS on RA, AO to self and somewhat place, anxious at times, constantly asking staff to stay in room saying she's afraid. Refusing tele. Neuros intact  ex unable to perform heel to shin, rickie LE numbness at baseline. C/O R back pain from wound vac which is to 125mmhg, CDI, minimal serosanguinous o.p. Back pain managed with scheduled tylenol, voltaren gel and prn Oxycodone given x 1 with effect. q2h turns w lift but will refuse at times. Repo legs/float heels. BLE patty/red. Skin red/patty; cleaned and lotions/powders applied. +2-+3 BLE edema - stockings on.

## 2022-08-14 NOTE — PROGRESS NOTES
Skin care/Wound care done per WOC orders to perineal area, buttocks, skin folds/panus/breasts, interdry applied as able. Perineum very broken down with some open/bleeding areas. Pt yells throughout cares and is very resistant allowing staff to continue due to back pain, needs lots of encouragement to continue with treatment.

## 2022-08-14 NOTE — PLAN OF CARE
"Goal Outcome Evaluation:    Plan of Care Reviewed With: patient     Reason for Admission: CVA - s/p TNK and thrombectomy.     Cognitive/Mentation: A/Ox2 - is not oriented to date or situation  Neuros/CMS: Generalized weakness. BLE weak, LUE slightly weaker than RUE.   VS: Stable on RA - refusing pulse ox monitoring or oxygen   Tele: Refusing  GI: BS active, BM smear this afternoon - otherwise no BM, incontinent.   : Had been incontinent and using purewick. Unable to void today using purewick or while up on commode - straight cathed for 600cc this afternoon.   Pulmonary: LS clear  Pain: Consistently reports back pain - oxycodone, repositioning, cold packs applied.     Drains/Lines: No lines  Skin: Wound care to panus/perineum done twice and once to BLE per order. Skin tear underneath wound vac dressing on left side noted (note left for WOC to look at tomorrow). Mepilex applied to open marbled sized area on left breast. Panus and perineum cleansed per order and interdry applied as able. Perineum is quite broken down with some open/bleeding areas on inner thighs. Wound vac patent at -125 with scant amount of drainage noted in tubing.  Activity: Bedrest, lift and 2-3 staff assistance for turns/wound care.  Diet: Mechanical soft diet, no appetite today, does not look like patient has had a good meal documented since 8/10, note left for rounding tomorrow.     Therapies recs: Long term care    Discharge: Pending placement    Aggression Stoplight Tool: Yellow    End of shift summary: Stable today aside from need for straight cath. Patient continues to be very anxious, calls out frequently. Yells for help but then yells at staff when trying to assist her \"You're hurting me, why are you hurting me, do you like hurting people?\". Much reassurance provided with all turns/repos/cares. Patient has been refusing to wear tele monitor, 02 monitoring or oxygen (was desatting overnight per [facility] nurse.). Spot checked while sleeping " today and was at 97%.

## 2022-08-14 NOTE — PROGRESS NOTES
VSS this shift, pt remains A&Ox1 this shift with stable neuro assessments as she is willing to participate.  Pt complained of pain to right back and bilateral legs, PRN oxycodone effective, pt able to fall asleep easily.  Pt was anxious and restless at the beginning of the shift, this improved after PRN oxycodone as well.  Anxiety spiked with repositioning and after several attempts to redirect pt and reduce anxiety, writer offered PRN hydroxyzine and ativan, effective for pain adjunct and for reducing anxiety enough for pt to easily fall back to sleep between repositioning.  Q2h repositioning completed this shift; pt required extensive encouragement and education to accept repositioning.  Pt continues with female external catheter, voiding adequately.  Bladder scan at the beginning of the evening noted to be 4mL at most.  Continuous pulse ox reveals some drops in SpO2 overnight to mid to low 80%.  Writer placed pt on 4LPM, which she removed at some point between when writer placed the oxygen and the next rounding, however, SpO2 alarm did not go off during time while writer was monitoring from the nurses station.  Writer updated Dr. Diaz at 03:36 per orders to update of SpO2 <94%, no new orders.  Pt noted to have periods of apnea and/or snoring during sleep, potential SHYLA.  NPWT to right flank remains in place on 125mmHg suction, clean, dry, and intact.

## 2022-08-14 NOTE — PROVIDER NOTIFICATION
Brief update:    Pt w/ suspected sleep apnea.   O2 saturations stay 90 and above, but placed on facemask O2 (mouth breathing) to keep saturations >94    Salvador Diaz MD  3:38 AM

## 2022-08-14 NOTE — PROGRESS NOTES
Wound care done to BLE, panus and perineal area as ordered. Open skin tear noted underneath wound vac dressing on the left side of patients back, note left for WOC to look at tomorrow. New marble sized round open area on left breast - mepliex applied.

## 2022-08-14 NOTE — PLAN OF CARE
Pt was admitted after CVA s/p TNK and thrombectomy. Alert and oriented x2 - able to state that she is in the hospital, but unable to identify where. D/o to time and situation. VSS on RA. Neuros stable with LUE drift, BLEs 2-3/5. Forgetful and emotionally labile. Responds well to emotional support and encouragement, particularly during cares and repositioning. C/o pain in buttocks and back when in chair. Needed encouragement, education, and coaching to participate in repositioning q 2 hrs. Wounds vac patent at -125 with minimal s/s drainage. Lung sounds clear. +BS. Purewick in place with adequate urine o/p. Transfer with overhead lift. Skin wound care per WOC orders. Plan to discharge to TCU/LTC pending placement.

## 2022-08-14 NOTE — PROGRESS NOTES
[facility]    Hospitalist Progress Note      Assessment & Plan   Cristy Bailey is a 77 year old female with a PMHx significant for morbid obesity, hypertension, and hyperlipidemia, who presented to [facility] 7/13/2022 with complete left-sided paralysis, left-sided facial droop, and difficulty speaking. CTA head remarkable for right MCA occlusion. She was given tenecteplase and subsequently transferred to [facility] 7/13/2022 for thrombectomy.      Acute right MCA ischemic stroke with edema and right to left midline shift  S/p tenecteplase and subsequent R MCA mechanical thrombectomy 7/13/22  * Presented to [facility] 7/13 with complete paralysis, left-sided facial droop, and aphasia. Code stroke initiated. Head CT 7/13 showed signs of an evolving R MCA infarct. CTA head 7/13 showed a right carotid terminus occlusion, right middle cerebral artery M1  segment occlusion with poor opacification of the more distal right MCA branches/poor collateral flow. CTA neck 7/13 negative for acute occlusions. Pt given tenecteplase 7/13 at 13:11. Noted to be agitated and was subsequently intubated given need for procedure.   * Subsequently transferred to [facility] 7/13/2022 where she underwent above procedure.   7/14: Extubated. Head CT 7/14 showed evolution of acute infarct involving the R MCA distribution with increased swelling, cortical effacement, and new mild right-to-left midline shift; questionable hypoattenuation involving the bilateral occipital lobes which could be artifactual.   * Additional stroke workup pursued this stay -- echo 7/14 showed EF 50%, grade 1 diastolic dysfunction   * Started ASA and atorvastatin per stroke team.   * Repeat head CT 7/15 showed evolving R MCA stroke with areas of edema, overall stable.  -- conts on full dose ASA and statin  -- goal SBP <140/90  -- 30d cardiac event monitor at discharge  -- cont PT/OT, plan placement given ongoing therapy and wound vac  needs  -- mgmt of dysphagia as below  -- will need to follow up with [facility] in 6-8 wks (due beginning of September)     Anxiety   -- cont Seroquel 25mg HS, 12.5mg at dinner and 12.5mg po q6h prn  -- psychiatry consulted for 8/15 eval    Urinary retention  Retaining urine on 8/14. UA WNL, not worrisome for infection  - prn straight cath, if requires x3, place snyder cath    Dysphagia due to CVA  * Seen by SLP and noted with dysphagia. Required some intermittent fluid boluses.  -- cont mechanical soft diet per SLP recs     Dyslipidemia  * FLP this stay showed tot cholest 163, HDL 47, LDL 91, .   * Started on atorvastatin 40 mg daily     Volume overload dt diastolic CHF exacerbation: Improved  Essential hypertension  * Not on/needing meds PTA.   * As above, echo this stay showed EF 50% with grade I diastolic dysfunction; RV not well visualized but appeared mild-moderately dilated with global systolic function probably mildly reduced.   * BPs were initially soft this stay and required IVFs.   * BPs improved, ultimately developed pedal edema required IV Lasix   * Started on lisinopril this stay  -- cont lisinopril 10mg daily  -- cont Lasix BID (40mg po in AM, 20mg po in afternoon)     Prolonged QTc   * EKG on 7/13 showed QTc 494.   * Repeat EKG on 7/30 (while on Levaquin) showed QTc table at 475.   -- monitoring on telemetry this stay     Chronic bilateral LE edema with chronic venous stasis dermatitis and chronic skin changes  Hx of LLE cellulitis  * Was hospitalized in 11/2021 for sepsis dt to pneumonia and LLE cellulitis.   * During this stay, BLE noted to be patty and edematous, no unilateral leg swelling appreciated; LLE had patchy areas of erythema, no fluctuance, no painful areas with palpation; legs warm to touch. Lymphedema consulted.   -- cont LE elevation, lymphedema wraps     Lower back/R flank wound/abscess, suspect dt pressure type injury, s/p surgical debridement on 7/30/22  OA  Hx of bilateral hip  replacements   Hx of chronic neck and back pain  * Pt with significant back pain early on in hospital stay. Was requiring IV hydromorphone.  Dose had to be decreased dt somnolence.  * MRI L-spine in 2018 showed multilevel degenerative changes with mild central stenosis. Patient has chronic back pain, reports having it over the last 2 years.   * During this stay, patient was noted with 5-6cm by 2-3cm wound with swelling/fluid/blistering in a fold of her lower back, did not appear infected; this seemed to be the source of her pain. Padded dressing placed and WOC RN ordered. Pain initially improved.   * Was placed on course of Augmentin on 7/24.   * On 7/26, was re-evaluated by WOC RN. Wound appeared more erythematous and purulence expressed. Worsening with shear stress from lift. Procal <0.05, WBC WNL. Abx changed to IV clindamycin.  * Wound cultures obtained. On 7/28, wound grew proteus and staph aureus (MRSA neg). US neg for abscess.   * Lost IV access on 7/28 so abx were changed to oral clindamycin and oral levaquin. IV access re-established but was continued on oral abx.  * General surgery consulted, ultimately underwent excisional debridement of R flank abscess in OR on 7/30. Intraop cultures showed polymicrobial growth with proteus mirabilis x2 strains (both pan-sensitive), corynebacterium striatum and enterococcus faecalis.  * Wound vac placed per general surgery on 8/2  * ID consulted on 8/2 and abx narrowed to Augmentin alone, completed an additional 5 days of treatment on 8/7.   -- wound vac mgmt per WOC RN, MWF changes  -- prns available for pain  -- cont regular repositioning     Suspected meningioma left parietal region, incidentally seen on CT  * Head CT 7/13 showed a calcified extra-axial mass overlying the left parietal region measuring 1.4 cm, potentially representing a meningioma.  * Will need serial monitoring OP after discharge.      Anemia, suspect dilutional component  * Hgb normal on admit. Hgb  11.5 on 7/16. No overt clinical signs of major bleeding.  * Hgb now stable at 12-13  -- monitor labs periodically      Intertriginous dermatitis  * Chronic and stable, cont clotrimazole powder     Constipation  * Continue sched bowel regimen     Hypokalemia  Hypophosphatemia  * Potassium and phosphorus low at times this hospitalization. Treated with replacement.  -- started daily 20meq KCl replacement on 8/9, K so far stable     Morbid obesity  * BMI 59 this stay. Recommend aggressive dietary and lifestyle modifications as condition improves    Suspected sleep apnea  O2 drop to mid 80s overnight 8/14. Briefly placed on 4LPM, which she then removed and then slept okay with sats in 90s thereafter.  - recommend sleep study as outpatient     Moderate malnutrition in context of acute illness and chronic disease  * Nutritionist following. On modified diet as above.      COVID-19 vaccination status  * Not vaccinated. Family contemplating vaccine.    Clinically Significant Risk Factors Present on Admission                        DVT Prophylaxis: Enoxaparin (Lovenox) SQ  Code Status: Full Code     Expected Discharge Date: 08/16/2022    Discharge Delays: Placement - LTC  Financial counseling needed    Discharge Comments: LTC  Patient has wound vac await placement    Melissa Howe DO  Hospitalist Service  [facility]  Securely message with the [employer] Web Console (learn more here)  Text Page (7am - 6pm) via [employer] Paging/Directory      Interval History   Patient seen and examined. No chest pain, shortness of breath. Wants to stay sleeping this morning, asks me to talk quieter, then falls asleep. Does have pain in legs at times. Anxious overnight. Had O2 drop to mid 80s overnight, she was placed on supplemental oxygen, which she then removed, but O2 sats were okay. Likely some aspect of sleep apnea.    -Data reviewed today: I reviewed all new labs and imaging results over the last 24 hours. I personally  reviewed no images or EKG's today.    Physical Exam   Temp: 97.6  F (36.4  C) Temp src: Oral BP: 126/60 Pulse: 64   Resp: 14 SpO2: 97 % O2 Device: None (Room air) Oxygen Delivery: 4 LPM  Vitals:    07/28/22 1508 08/05/22 0926 08/14/22 0600   Weight: (!) 159.2 kg (350 lb 14.4 oz) (!) 155.6 kg (343 lb) (!) 153.9 kg (339 lb 3.2 oz)     Vital Signs with Ranges  Temp:  [97.5  F (36.4  C)-97.7  F (36.5  C)] 97.6  F (36.4  C)  Pulse:  [64-75] 64  Resp:  [14-16] 14  BP: (103-126)/(52-60) 126/60  SpO2:  [86 %-97 %] 97 %  I/O last 3 completed shifts:  In: 560 [P.O.:560]  Out: 500 [Urine:500]    Constitutional: Drowsy, cooperative, no apparent distress  Respiratory: Clear to auscultation bilaterally, no crackles or wheezing  Cardiovascular: Regular rate and rhythm, normal S1 and S2, and no murmur noted  GI: Normal bowel sounds, soft, non-distended, non-tender  Skin/Integumen: No rashes, no cyanosis, +1 lower extremity edema with some chronic stasis changes  Other: Wound vac in place right flank    Medications     - MEDICATION INSTRUCTIONS -         acetaminophen  975 mg Oral Q8H     aspirin  325 mg Oral Daily     atorvastatin  40 mg Oral QPM     diclofenac  2 g Topical 4x Daily     enoxaparin ANTICOAGULANT  40 mg Subcutaneous Q12H     furosemide  20 mg Oral Daily at 4 pm     furosemide  40 mg Oral Daily     lactobacillus rhamnosus (GG)  1 capsule Oral BID     lisinopril  10 mg Oral Daily     miconazole   Topical BID     multivitamin, therapeutic  1 tablet Oral Daily     polyethylene glycol  17 g Oral Daily     potassium chloride  20 mEq Oral Daily     pramoxine   Topical TID     QUEtiapine  12.5 mg Oral Daily with supper     QUEtiapine  25 mg Oral At Bedtime     senna-docusate  1-2 tablet Oral BID       Data   Recent Labs   Lab 08/14/22  0636 08/12/22  0741 08/11/22  0836 08/10/22  0809 08/08/22  1626 08/08/22  1000 08/08/22  0829   WBC  --   --   --   --   --  7.4  --    HGB  --   --   --   --   --  12.9  --    MCV  --   --    --   --   --  89  --      --  375  --   --  365  --    NA  --   --   --   --   --  138  --    POTASSIUM  --  3.7 3.5 3.5   < > 3.4  --    CHLORIDE  --   --   --   --   --  103  --    CO2  --   --   --   --   --  29  --    BUN  --   --   --   --   --  20  --    CR 0.72  --  0.72  --   --  0.67  --    ANIONGAP  --   --   --   --   --  6  --    ASHIA  --   --   --   --   --  8.7  --    GLC  --   --   --   --   --  104* 88    < > = values in this interval not displayed.       No results found for this or any previous visit (from the past 24 hour(s)).

## 2022-08-14 NOTE — PROVIDER NOTIFICATION
Pt did not void today - bladder scanned for 550cc. Assisted pt to commode via lift and she was still unable to void. Notified Dr. Howe to inquire about straight cath as orders or from mid July. Orders received to straight cath and send a UA.

## 2022-08-15 NOTE — PROGRESS NOTES
DATE & TIME: 08/14/2022 Night    Cognitive Concerns/ Orientation :Disoriented to time/situation, irritable at times   BEHAVIOR & AGGRESSION TOOL COLOR: Green  ABNL VS/O2: Vitals 2x/day only  MOBILITY: Assist-2, lift; Turn/repo every 2 hours, though pt refuses most turns despite being given rationale  PAIN MANAGMENT: Denies pain; scheduled tylenol  DIET: Mechanical soft  BOWEL/BLADDER: Due to void; bladder scan 178 mL at 0440  ABNL LAB/BG: WNL  DRAIN/DEVICES: No IV access  TELEMETRY RHYTHM: Normal sinus rhythm  SKIN: Wound vac to RL back wound; small opening outside of vac dressing on low left portion; several back wounds covered with Mepilex ; Raw/rash under pannus, interdry used; Bilateral lower extremity mesh compression stockings  D/C DATE: Awaiting placement with ability to manage wound vac, TCU to Long term care  OTHER IMPORTANT INFO: Adamantly refuses repositioning, allowing only weight shifting, pillow adjustments

## 2022-08-15 NOTE — PROGRESS NOTES
08/15/2022    Shift Summary [id number]    Admitting Diagnosis: CVA (cerebral vascular accident) (H) [I63.9]   Vitals : stable Bps, room air  Pain 7-8/10. Taking 5mg Oxy PRN.  A&Ox4, with confusion.   Voiding : Oliguria.   Mobility : lift for transfers.   Tele : ordered for telemetry monitoring but continues to disconnect tele.   CMS : numbness in BLE.   Lung Sounds CLEAR anterior side, incomplete assessment in lower lobes due to difficulty with positioning.   Room air. O2 sats above 90-%.    GI : constipation. Reassess and administer suppository.    Dressing : wound vac dressing change performed by WO Nurse. REDNESS IN ABD folds. More redness on the left side of pannus.      Orders Placed This Encounter      Advance Diet as Tolerated      Mechanical/Dental Soft Diet       Plan:   Awaiting LTC placement.      Up in chair during day shift. C/O pain in BLE. oXY GIVEN.

## 2022-08-15 NOTE — PROGRESS NOTES
"[facility] Nurse Inpatient Assessment     Consulted for: vac dressing changes to Right lower back wound (Stage 3 HAPI)         Areas Assessed:      Areas visualized during today's visit: back    Wound location: Right lower back in waist crease          Wound due to: Hospital Acquired Pressure injury stage 3   Stage: per prior charting \"Unstageable 7/28, Following surgical debridement 7/30 Stage 3 Pressure Injury and Moisture Associated Skin Damage (MASD), suspect intertriginous pressure, appears to be deeper tissue damage within a crease, possible shear component from lift.\"  Wound history/plan of care: found with wound vac in use       Surgical date: 7-30-22 Dr. Black     Date Negative Pressure Wound Therapy initiated: 8-2-22     Interventions in place: repositioning, specialty surface in use, moisture/incontinence management and offloading    Is patient s nutritional status compromised? yes   a. If yes, what interventions are in place? Protein supplements    Reason for initiating vac therapy? Presence of co-morbidities, High risk of infections and Need for accelerated granulation tissue    Which?of?the?following?co-morbidities?apply? Obesity  a. If diabetic is patient on a diabetic management program? N/A     Is osteomyelitis present in wound? no  a.  If yes what treatments are in place? N/A    Wound base: red moist granular tissue     Palpation of the wound bed: normal       Drainage: moderate      Description of drainage: serosanguinous      Measurements (length x width x depth, in cm) approx 3 x 13 x 2cm     Tunneling N/A      Undermining approx 1-3cm near 8-10 o'clock   Periwound skin: scattered small areas of necrosis along wound edges that are evolving and currently moist red and tan; general mild erythema and maceration      Color: pink       Temperature: normal to warm and sweaty  Odor: minimal  Pain: moderate to briefly severe at times  Pain intervention prior to " dressing change: premedicated with tyleonol; distraction techniques   Treatment goal: Heal  and Increase granulation  STATUS: improving   Supplies ordered: at bedside      Treatment Plan:     Negative pressure wound therapy plan:  Wound location: right lower back   Change Days: Mon/Wed/Fri by WOC RN    Supplies (including all accessories) used: medium Black foam and ostomy ring x 3  Cleanse with Vashe prior to replacing VAC    Suction setting: -125   Methods used: Window paned all periwound skin with vac drape prior to applying sponge, Bridged trac pad off bony prominences and Placed barrier ring into periwound creases to improve seal    Staff RN to assess integrity of dressing and ensure suction is set at appropriate level every shift.   Date canister. Chart canister output every shift. Change cannister weekly and PRN if full/occluded     Remove foam dressing and replace with BID normal saline or Vashe moist gauze dressing if:   -a dressing failure which cannot be repaired within 2 hours   -patient is discharging to home without a home pump   -patient is discharging to a facility outside the local area      The hospital VAC pump is not to be discharged with the patient.?Ensure to disconnect patient from machine prior to discharge. Then,    - If a home KCI VAC pump has been delivered, connect home cannister to dressing tubing then connect cannister to home pump and turn on machine    - If transferring to a nearby facility with a KCI vac, can disconnect and clamp tubing then cover end with glove so can be reconnected within 2 hours  \    Orders: Reviewed    RECOMMEND PRIMARY TEAM ORDER: None, at this time  Education provided: importance of repositioning, plan of care and Off-loading pressure  Discussed plan of care with: Patient and Nurse  WO nurse follow-up plan: Monday/WednesdayFriday  Notify WOC if wound(s) deteriorate.  Nursing to notify the Provider(s) and re-consult the WOC Nurse if new skin concern.    DATA:      Current support surface: Bariatric Low air loss mattress    Containment of urine/stool: Incontinence Protocol and Incontinent pad in bed  BMI: Body mass index is 54.75 kg/m .   Active diet order: Orders Placed This Encounter      Advance Diet as Tolerated      Mechanical/Dental Soft Diet     Output: I/O last 3 completed shifts:  In: -   Out: 600 [Urine:600]     Labs:   Recent Labs   Lab 08/15/22  0743   HGB 13.3   WBC 6.7     Pressure injury risk assessment:   Sensory Perception: 3-->slightly limited  Moisture: 3-->occasionally moist  Activity: 2-->chairfast  Mobility: 2-->very limited  Nutrition: 2-->probably inadequate  Friction and Shear: 2-->potential problem  Demetrio Score: 14    Eleanor LREFREN   Dept. Pager: [phone]  Dept. Office Number: [phone]

## 2022-08-15 NOTE — CONSULTS
"Triage and Transition - Consult and Liaison     Cristy Bailey  August 14, 2022    Session start: 8:50PM  Session end: 9:00PM  Session duration in minutes: 10  CPT utilized: non-billable  Patient was seen in-person.  Anticipated number of sessions or this episode of care: 1-4    Diagnosis:   Adjustment Disorders  309.4 (F43.25) With mixed disturbance of emotions and conduct    Plan/Recommendations:     Continue care coordination with Social Work    Maintain current transition plan. Next steps include: LTC placement.     Additional interventions may involve IP SHS Consult, if patient interested.    IP Psychiatry Consult order active for psych med review, 8/15 or 8/16 according to provider availability.    Reason for consult: Cristy is 77 year old  female . Psychiatry consult was requested due to anxiety, emotional lability. [facility] team.     Presenting problem: The patient has been hospitalized for one month following additional CVA. She has wound vac and multiple areas of skin breakdown and tears requiring regular cares. Per care team, the patient exhibits symptoms of anxiety, emotional lability, verbal abuse to staff. Per chart review, the patient has not had a documented full meal since 8/10. When this writer is rounding on the unit, the patient is observed to be yelling out from her bed \"Help me\" for long periods of time, clearly audible down the hallways of [facility] with her room door closed. This writer has observed staff attending to patient.     Session Summary: The patient is seen briefly with JEEVAN Méndez, and then one-on-one. The patient displays irritability, anxiety, feelings of helplessness, hopelessness about her pain and ability/willingness/availability of anyone to \"help me.\" Attempted to redirect patient to try breathing technique to assist with pain control, anxiety. Pain unwilling to participate, insisted that she has to yell out for RN to come back to help her or they " "will not come for a long time. Patient verbalized understanding of brief teaching about fear and anxiety's affect on brain's processing of physical pain. Patient reported \"fear\" associated with cares and turns that increase her physical pain. Patient also appeared to be afraid of not receiving cares often enough or perhaps sense of abandonment. Patient may benefit from very brief therapeutic interventions, mental health or OT, to reduce social isolation and anxiety and improve coping skills for necessary cares.     Mental Status Exam   Affect: Dramatic  Appearance: Other: hospital garb   Attention Span/Concentration: Inattentive    Eye Contact: Variable  Fund of Knowledge: Other: unable to assess, limited engagement   Language /Speech Content: Expressive Speech  Language /Speech Volume: Other: intermittently yelling for RN, speech to this writer within expected limits   Language /Speech Rate/Productions: hyperverbal, limited content  Recent Memory: Variable  Remote Memory: Other: unable to assess, limited engagement  Mood: Anxious, Depressed and Irritable   Orientation:   Person: Yes  Place: Yes  Time of Day: Yes   Date: not assessed  Situation (Do they understand why they are here?): Yes   Psychomotor Behavior: Other: fidgety, upset about not being able to move to a comfortable position   Thought Content: Other: perseverations on pain, needing help  Thought Form: Obsessive/Perseverative    Current medications:   Current Facility-Administered Medications   Medication     acetaminophen (TYLENOL) tablet 975 mg     aspirin (ASA) tablet 325 mg     atorvastatin (LIPITOR) tablet 40 mg     benzocaine-menthol (CHLORASEPTIC) 6-10 MG lozenge 1 lozenge     bisacodyl (DULCOLAX) suppository 10 mg     calcium carbonate (TUMS) chewable tablet 500 mg     diclofenac (VOLTAREN) 1 % topical gel 2 g     enoxaparin ANTICOAGULANT (LOVENOX) injection 40 mg     furosemide (LASIX) tablet 20 mg     furosemide (LASIX) tablet 40 mg     " hydrALAZINE (APRESOLINE) injection 10 mg     hydrOXYzine (ATARAX) tablet 25 mg    Or     hydrOXYzine (ATARAX) tablet 50 mg     labetalol (NORMODYNE/TRANDATE) injection 10 mg     lactobacillus rhamnosus (GG) (CULTURELL) capsule 1 capsule     lidocaine (LMX4) cream     lidocaine 1 % 0.1-1 mL     lisinopril (ZESTRIL) tablet 10 mg     LORazepam (ATIVAN) tablet 0.5 mg     Medication Instruction - Avoid dextrose in IV solutions     miconazole (MICATIN) 2 % powder     multivitamin, therapeutic (THERA-VIT) tablet 1 tablet     naloxone (NARCAN) injection 0.2 mg    Or     naloxone (NARCAN) injection 0.4 mg    Or     naloxone (NARCAN) injection 0.2 mg    Or     naloxone (NARCAN) injection 0.4 mg     ondansetron (ZOFRAN ODT) ODT tab 4 mg    Or     ondansetron (ZOFRAN) injection 4 mg     oxyCODONE IR (ROXICODONE) half-tab 2.5-5 mg     polyethylene glycol (MIRALAX) Packet 17 g     potassium chloride (KLOR-CON) Packet 20 mEq     pramoxine (PRAX) 1 % lotion     QUEtiapine (SEROquel) half-tab 12.5 mg     QUEtiapine (SEROquel) half-tab 12.5 mg     QUEtiapine (SEROquel) tablet 25 mg     senna-docusate (SENOKOT-S/PERICOLACE) 8.6-50 MG per tablet 1-2 tablet         MeasurableTreatment Goal(s) related to diagnosis / functional impairment(s)    Goal: Patient will increase awareness of anxiety and their impact on functioning and develop skills to reduce negative impact.      Objective A: Patient will describe thoughts, feelings, and actions associated with anxiety.       Intervention(s): LMHP will explore and process with patient how anxiety has impacted them.      Objective B: Patient will increase distress tolerance coping skills.      Intervention(s): LMHP will teach DBT skills and model their use.         Goal: Patient will identify anxiety triggers and maladaptive responses to them.      Objective A: Patient will identify situations, thoughts, and behaviors that trigger anxiety.      Intervention(s): LMHP will facilitate guided  discussion.      Objective B: Patient will identify maladaptive responses to anxiety and develop at least 2 alternative strategies for coping.      Intervention(s): LMHP will provide psychoeducation and modeling.                 Therapeutic intervention and progress:  Therapeutic intervention consisted of building therapeutic rapport, active listening, validation, thought reframing, engaging in learning/practicing coping skills, stress relief practices, DBT concepts and Motivational Interviewing. Patient is making progress towards treatment goals as evidenced by participating in some cares and responding to some questions.     Reviewed chart and coordinated with Honey Silverio RN.      EMEKA SMITH M.Ed., Merged with [facility], [facility]  Triage and Transition - Consult and Liaison   [phone]

## 2022-08-15 NOTE — PROVIDER NOTIFICATION
[id number]    Patient continues to call out throughout the shift, multiple attempts to help with comfort, repositioning. Oxycodone and Ativan given at HS. Bladder scanned at 2230 for 124cc.

## 2022-08-15 NOTE — PROGRESS NOTES
[facility]    Hospitalist Progress Note      Assessment & Plan   Cristy Bailey is a 77 year old female with a PMHx significant for morbid obesity, hypertension, and hyperlipidemia, who presented to [facility] 7/13/2022 with complete left-sided paralysis, left-sided facial droop, and difficulty speaking. CTA head remarkable for right MCA occlusion. She was given tenecteplase and subsequently transferred to [facility] 7/13/2022 for thrombectomy.      Acute right MCA ischemic stroke with edema and right to left midline shift  S/p tenecteplase and subsequent R MCA mechanical thrombectomy 7/13/22  * Presented to [facility] 7/13 with complete paralysis, left-sided facial droop, and aphasia. Code stroke initiated. Head CT 7/13 showed signs of an evolving R MCA infarct. CTA head 7/13 showed a right carotid terminus occlusion, right middle cerebral artery M1  segment occlusion with poor opacification of the more distal right MCA branches/poor collateral flow. CTA neck 7/13 negative for acute occlusions. Pt given tenecteplase 7/13 at 13:11. Noted to be agitated and was subsequently intubated given need for procedure.   * Subsequently transferred to [facility] 7/13/2022 where she underwent above procedure.   7/14: Extubated. Head CT 7/14 showed evolution of acute infarct involving the R MCA distribution with increased swelling, cortical effacement, and new mild right-to-left midline shift; questionable hypoattenuation involving the bilateral occipital lobes which could be artifactual.   * Additional stroke workup pursued this stay -- echo 7/14 showed EF 50%, grade 1 diastolic dysfunction   * Started ASA and atorvastatin per stroke team.   * Repeat head CT 7/15 showed evolving R MCA stroke with areas of edema, overall stable.  -- conts on full dose ASA and statin  -- goal SBP <140/90  -- 30d cardiac event monitor at discharge  -- cont PT/OT, plan placement given ongoing therapy and wound vac  needs  -- mgmt of dysphagia as below  -- will need to follow up with [facility] in 6-8 wks (due beginning of September)     Anxiety   -- cont Seroquel 25mg HS, 12.5mg at dinner and 12.5mg po q6h prn  -- added mirtazapine 7.5mg on 8/15 evening given decreased appetite  -- psychiatry consulted for med eval    Urinary retention  Retaining urine on 8/14. UA WNL, not worrisome for infection  - prn straight cath, if requires x3, place snyder cath    Dysphagia due to CVA  * Seen by SLP and noted with dysphagia. Required some intermittent fluid boluses.  -- cont mechanical soft diet per SLP recs     Dyslipidemia  * FLP this stay showed tot cholest 163, HDL 47, LDL 91, .   * Started on atorvastatin 40 mg daily     Volume overload dt diastolic CHF exacerbation: Improved  Essential hypertension  * Not on/needing meds PTA.   * As above, echo this stay showed EF 50% with grade I diastolic dysfunction; RV not well visualized but appeared mild-moderately dilated with global systolic function probably mildly reduced.   * BPs were initially soft this stay and required IVFs.   * BPs improved, ultimately developed pedal edema required IV Lasix   * Started on lisinopril this stay  -- cont lisinopril 10mg daily  -- cont Lasix BID (40mg po in AM, 20mg po in afternoon)     Prolonged QTc   * EKG on 7/13 showed QTc 494.   * Repeat EKG on 7/30 (while on Levaquin) showed QTc table at 475.   -- monitoring on telemetry this stay     Chronic bilateral LE edema with chronic venous stasis dermatitis and chronic skin changes  Hx of LLE cellulitis  * Was hospitalized in 11/2021 for sepsis dt to pneumonia and LLE cellulitis.   * During this stay, BLE noted to be patty and edematous, no unilateral leg swelling appreciated; LLE had patchy areas of erythema, no fluctuance, no painful areas with palpation; legs warm to touch. Lymphedema consulted.   -- cont LE elevation, lymphedema wraps     Lower back/R flank wound/abscess, suspect dt pressure type  injury, s/p surgical debridement on 7/30/22  OA  Hx of bilateral hip replacements   Hx of chronic neck and back pain  * Pt with significant back pain early on in hospital stay. Was requiring IV hydromorphone.  Dose had to be decreased dt somnolence.  * MRI L-spine in 2018 showed multilevel degenerative changes with mild central stenosis. Patient has chronic back pain, reports having it over the last 2 years.   * During this stay, patient was noted with 5-6cm by 2-3cm wound with swelling/fluid/blistering in a fold of her lower back, did not appear infected; this seemed to be the source of her pain. Padded dressing placed and WOC RN ordered. Pain initially improved.   * Was placed on course of Augmentin on 7/24.   * On 7/26, was re-evaluated by WOC RN. Wound appeared more erythematous and purulence expressed. Worsening with shear stress from lift. Procal <0.05, WBC WNL. Abx changed to IV clindamycin.  * Wound cultures obtained. On 7/28, wound grew proteus and staph aureus (MRSA neg). US neg for abscess.   * Lost IV access on 7/28 so abx were changed to oral clindamycin and oral levaquin. IV access re-established but was continued on oral abx.  * General surgery consulted, ultimately underwent excisional debridement of R flank abscess in OR on 7/30. Intraop cultures showed polymicrobial growth with proteus mirabilis x2 strains (both pan-sensitive), corynebacterium striatum and enterococcus faecalis.  * Wound vac placed per general surgery on 8/2  * ID consulted on 8/2 and abx narrowed to Augmentin alone, completed an additional 5 days of treatment on 8/7.   -- wound vac mgmt per WOC RN, MWF changes  -- prns available for pain  -- cont regular repositioning     Suspected meningioma left parietal region, incidentally seen on CT  * Head CT 7/13 showed a calcified extra-axial mass overlying the left parietal region measuring 1.4 cm, potentially representing a meningioma.  * Will need serial monitoring OP after discharge.       Anemia, suspect dilutional component  * Hgb normal on admit. Hgb 11.5 on 7/16. No overt clinical signs of major bleeding.  * Hgb now stable at 12-13  -- monitor labs periodically      Intertriginous dermatitis  * Chronic and stable, cont clotrimazole powder     Constipation  * Continue sched bowel regimen     Hypokalemia  Hypophosphatemia  * Potassium and phosphorus low at times this hospitalization. Treated with replacement.  -- started daily 20meq KCl replacement on 8/9, K so far stable     Morbid obesity  * BMI 59 this stay. Recommend aggressive dietary and lifestyle modifications as condition improves    Suspected sleep apnea  O2 drop to mid 80s overnight 8/14. Briefly placed on 4LPM, which she then removed and then slept okay with sats in 90s thereafter.  - recommend sleep study as outpatient     Moderate malnutrition in context of acute illness and chronic disease  * Nutritionist following. On modified diet as above.      COVID-19 vaccination status  * Not vaccinated. Family contemplating vaccine.    Clinically Significant Risk Factors Present on Admission                        DVT Prophylaxis: Enoxaparin (Lovenox) SQ  Code Status: Full Code     Expected Discharge Date: 08/16/2022    Discharge Delays: Placement - LTC  Financial counseling needed    Discharge Comments: LTC  Patient has wound vac await placement    Melissa Howe DO  Hospitalist Service  [facility]  Securely message with the Vocera Web Console (learn more here)  Text Page (7am - 6pm) via my6sense Paging/Directory      Interval History   Patient seen and examined. She reports back pain, ongoing anxiety about being left alone. Has been repositioned multiple times so far this morning. Eating less overall. States she doesn't have an appetite. Has been refusing O2 monitor, telemetry.    -Data reviewed today: I reviewed all new labs and imaging results over the last 24 hours. I personally reviewed no images or EKG's  today.    Physical Exam   Temp: 97.6  F (36.4  C) Temp src: Oral BP: 133/60 Pulse: 90   Resp: 18 SpO2: 100 % O2 Device: None (Room air)    Vitals:    07/28/22 1508 08/05/22 0926 08/14/22 0600   Weight: (!) 159.2 kg (350 lb 14.4 oz) (!) 155.6 kg (343 lb) (!) 153.9 kg (339 lb 3.2 oz)     Vital Signs with Ranges  Temp:  [97.6  F (36.4  C)] 97.6  F (36.4  C)  Pulse:  [90] 90  Resp:  [18] 18  BP: (133)/(60) 133/60  SpO2:  [97 %-100 %] 100 %  I/O last 3 completed shifts:  In: 320 [P.O.:320]  Out: 600 [Urine:600]    Constitutional: Awake, alert, cooperative, anxious  Respiratory: Clear to auscultation bilaterally, no crackles or wheezing  Cardiovascular: Regular rate and rhythm, normal S1 and S2, and no murmur noted  GI: Normal bowel sounds, soft, non-distended, non-tender  Skin/Integumen: No rashes, no cyanosis, +1 lower extremity edema with some chronic stasis changes (edem[place]ear in place)  Other: Wound vac in place right flank    Medications     - MEDICATION INSTRUCTIONS -         acetaminophen  975 mg Oral Q8H     aspirin  325 mg Oral Daily     atorvastatin  40 mg Oral QPM     diclofenac  2 g Topical 4x Daily     enoxaparin ANTICOAGULANT  40 mg Subcutaneous Q12H     furosemide  20 mg Oral Daily at 4 pm     furosemide  40 mg Oral Daily     lactobacillus rhamnosus (GG)  1 capsule Oral BID     lisinopril  10 mg Oral Daily     miconazole   Topical BID     multivitamin, therapeutic  1 tablet Oral Daily     polyethylene glycol  17 g Oral Daily     potassium chloride  20 mEq Oral Daily     pramoxine   Topical TID     QUEtiapine  12.5 mg Oral Daily with supper     QUEtiapine  25 mg Oral At Bedtime     senna-docusate  1-2 tablet Oral BID       Data   Recent Labs   Lab 08/14/22  0636 08/12/22  0741 08/11/22  0836 08/10/22  0809 08/08/22  1626 08/08/22  1000 08/08/22  0829   WBC  --   --   --   --   --  7.4  --    HGB  --   --   --   --   --  12.9  --    MCV  --   --   --   --   --  89  --      --  375  --   --  365  --     NA  --   --   --   --   --  138  --    POTASSIUM  --  3.7 3.5 3.5   < > 3.4  --    CHLORIDE  --   --   --   --   --  103  --    CO2  --   --   --   --   --  29  --    BUN  --   --   --   --   --  20  --    CR 0.72  --  0.72  --   --  0.67  --    ANIONGAP  --   --   --   --   --  6  --    ASHIA  --   --   --   --   --  8.7  --    GLC  --   --   --   --   --  104* 88    < > = values in this interval not displayed.       No results found for this or any previous visit (from the past 24 hour(s)).

## 2022-08-16 NOTE — PROGRESS NOTES
"CLINICAL NUTRITION SERVICES - REASSESSMENT NOTE      RECOMMENDATIONS FOR MD/PROVIDER TO ORDER:   - consider enteral nutrition with continued poor PO intake since admit and significant weight loss, if within GOC   Recommendations Ordered by Registered Dietitian (RD):   - continue with supplements as ordered  - ordered kate daily for continued wound healing   Malnutrition:   % Weight Loss:  > 5% in 1 month (severe malnutrition) - 9.5% loss in 1 month, suspect some may be fluid-related   % Intake:  </= 50% for >/= 1 month (severe malnutrition)  Subcutaneous Fat Loss:  Orbital region moderate depletion - visual  Muscle Loss:  Temporal region moderate depletion - visual  Fluid Retention: trace to Moderate generalized edema    Malnutrition Diagnosis: Severe malnutrition  In Context of:  Acute illness or injury  Chronic illness or disease       EVALUATION OF PROGRESS TOWARD GOALS   Diet:  Mechanical/Dental Soft   - Room Service w/ Assist   - gelatein+ BID with meals  - ensure enlive vanilla with breakfast    Intake/Tolerance:   - unable to visit with pt today as she did not wake up during this morning's attempt. She had 1 opened ensure on table that was still pretty full  - per nursing flow sheet, <50% intakes with often just bites of a meal documented  - per health touch, pt is receiving 3 meals/day  - per 8/15 DO note, pt Eating less overall. States she doesn't have an appetite  - per 8/14 psychiatrist note, patient has not had a documented full meal since 8/10  - per 8/12 RN note, pt Refused to eat/drink her food even when  tried.  \"it's probably good for her if she does not eat, so she lose some weight\".     Barriers: poor appetite, mentation/agitation, pain    ASSESSED NUTRITION NEEDS:  Dosing Weight: 89.7 kg (adjusted)  Estimated Energy Needs: 0670-4132 kcals (15-20 Kcal/Kg)  Justification: maintenance r/t BMI  Estimated Protein Needs: 108-135 grams protein (1.2-1.5 g pro/Kg)  Justification: wound " "healing, obesity guidelines, preservation of lean body mass and increased needs r/t age  Estimated Fluid Needs: (1 mL/Kcal)  Justification: maintenance and per provider pending fluid status      NEW FINDINGS:   General:  - pt is uncooperative and refusing meds  - awaiting LTC placement    Weight: loss of -16.1 kg since admit x34 days (9.5%)    Medications: lasix, culturell, remeron, thera-vit, miralax, potassium chloride packet, senna-docusate    GI: last BM x1 today so far    Skin:   - wound vac in place    - WOC following, 8/15  Wound location: Right lower back in waist crease    Wound due to: Hospital Acquired Pressure injury stage 3   Stage: per prior charting \"Unstageable 7/28, Following surgical debridement 7/30 Stage 3 Pressure Injury and Moisture Associated Skin Damage (MASD), suspect intertriginous pressure, appears to be deeper tissue damage within a crease, possible shear component from lift.\"  Wound history/plan of care: found with wound vac in use       Previous Goals:   Patient to consume >75% of nutritionally adequate meals TID with supplements  Evaluation: Not met    Previous Nutrition Diagnosis:   Inadequate oral intake related to related to prolonged admission, altered mental status, reduced appetite, increased needs secondary to wound healing as evidenced by <75% intakes x27 day admission  Evaluation: Declining      MALNUTRITION  % Weight Loss:  > 5% in 1 month (severe malnutrition) - 9.5% loss in 1 month, suspect some may be fluid-related   % Intake:  </= 50% for >/= 1 month (severe malnutrition)  Subcutaneous Fat Loss:  Orbital region moderate depletion - visual  Muscle Loss:  Temporal region moderate depletion - visual  Fluid Retention: trace to Moderate generalized edema    Malnutrition Diagnosis: Severe malnutrition  In Context of:  Acute illness or injury  Chronic illness or disease    CURRENT NUTRITION DIAGNOSIS  Inadequate oral intake related to related to prolonged admission, altered " mental status, reduced appetite, increased needs secondary to wound healing as evidenced by <50% intakes x34 day admission    INTERVENTIONS  Recommendations / Nutrition Prescription  - continue with supplements as ordered  - ordered kate daily for continued wound healing    Implementation  Medical Food Supplement    Goals  Patient to consume >50% of nutritionally adequate meals BID with supplements TID      MONITORING AND EVALUATION:  Progress towards goals will be monitored and evaluated per protocol and Practice Guidelines      Donna Ho RD, LD

## 2022-08-16 NOTE — PLAN OF CARE
Goal Outcome Evaluation:  Alert and oriented x2. Tolerating mechanical soft diet. Frustrated and uncooperative at times. Refused most evening medications. Up with 2 and a lift. Patient often declined being repositioned, education given. Weight shifting as able. Pain managed with oxycodone, tylenol, and Voltaren gel. +3 edema present to BLE. Edema wear removed around 0300 due to poor patient tolerance. Oliguria- BS for 123ml. One small BM overnight. Tele NSR. Wound vac in place. Redness present to skin folds, pt refused full skin assessment. Perineal and abdominal fold wound cares done as ordered.

## 2022-08-16 NOTE — CONSULTS
[facility] Psychiatric Consult Progress Note    Interval History:   Pt seen, chart reviewed, case discussed with nursing staff.    Patient has been calling out much of the day. She is yelling and fearful, highly anxoius per nursing. On review with patient she is paranoid. She mistakes people walking in the hallway for a man that is trying to harm her. She worries that her food is dangerous. She does not want to eat it because of the 'man' in the hallway. She believes it is a 'kitchen or something.' She has to be reassured it is a hospital hallway and many people are walking by continuosuly. Reassurance seems to work while in the room with her but quickly becomes agitated when one leaves the room. She acknowledges she is very anxious. Believes she is not sleeping well. She denies suicidal thoughts or thoughts of harming others. She feels depressed about her circumstances. She does not know why she is in the hospital. She would very much like to try different medication for her anxiety and fear. She reports pain in her legs. Nursing repositioned and added lotion. Patient seemed to have paranoid belief that the 'man' was trying to harm her legs specifically.      Review of systems:   10 point Review of Systems completed by Dr. Valderrama , and is  is negative other than noted in the HPI     Medications:       acetaminophen  975 mg Oral Q8H     aspirin  325 mg Oral Daily     atorvastatin  40 mg Oral QPM     diclofenac  2 g Topical 4x Daily     enoxaparin ANTICOAGULANT  40 mg Subcutaneous Q12H     furosemide  20 mg Oral Daily at 4 pm     furosemide  40 mg Oral Daily     lactobacillus rhamnosus (GG)  1 capsule Oral BID     lisinopril  10 mg Oral Daily     miconazole   Topical BID     mirtazapine  7.5 mg Oral At Bedtime     multivitamin, therapeutic  1 tablet Oral Daily     polyethylene glycol  17 g Oral Daily     potassium chloride  20 mEq Oral Daily     pramoxine   Topical TID     QUEtiapine  12.5 mg Oral  Daily with supper     QUEtiapine  25 mg Oral At Bedtime     senna-docusate  1-2 tablet Oral BID     sore throat lozenge, bisacodyl, calcium carbonate, hydrALAZINE, hydrOXYzine **OR** hydrOXYzine, labetalol, lidocaine 4%, lidocaine (buffered or not buffered), LORazepam, - MEDICATION INSTRUCTIONS -, naloxone **OR** naloxone **OR** naloxone **OR** naloxone, ondansetron **OR** ondansetron, oxyCODONE, QUEtiapine    Mental Status Examination:     Appearance:  awake, alert, obese, in gown, disheveled  Eye Contact:  fair  Speech:  clear, coherent  Language:Normal  Psychomotor Behavior:  no evidence of tardive dyskinesia, dystonia, or tics  Mood:  anxious  Affect:  mood congruent, intensity is exaggerated, intensity is heightened and intensity is dramatic  Thought Process:  illogical no loose associations  Thought Content:  no evidence of suicidal ideation or homicidal ideation and no auditory hallucinations present  Oriented to:  Month, day (looked at board though), said year was 1994. COuld not recall current or any recent presidents names  Attention Span and Concentration:  intact  Recent and Remote Memory:  limited  Fund of Knowledge: delayed  Muscle Strength and Tone: no rigidity apparent  Gait and Station: Not evaluated  Insight:  poor  Judgment: poor  Based on review of records, conversation, and general bedside observations unless otherwise noted        Labs/Vitals:   No results found for this or any previous visit (from the past 24 hour(s)).  B/P: 116/57, T: 97.5, P: 67, R: 16    Impression:   Cristy Bailey is a 77 year old female with a PMHx significant for morbid obesity, hypertension, and hyperlipidemia, who presented to [facility] 7/13/2022 with complete left-sided paralysis, left-sided facial droop, and difficulty speaking. CTA head remarkable for right MCA occlusion. She was given tenecteplase and subsequently transferred to [facility] 7/13/2022 for thrombectomy.     Patient is currently psychotic  with susbtantial paranoia perhaps due to resolving delirium from hospital stay, recent stroke, medical cormbidities, etc. Without knowing her previously unclear how much or if any pre-morbid psychiatric disorders contributed to this presentation. She is quite distressed and paranoid though so this warrants adjustments to her medications. Complicated by recent QTc prolongation, and patient's medically complicated obesity. Will try adding Abilify as a better possible longer term option for the depression and psychosis symptoms.       DIagnoses:   1 Delirium  2. Unspecified depressive disorder  3. Acute right MCA ischemic stroke with edema and right to left midline shift  S/p tenecteplase and subsequent R MCA mechanical thrombectomy 7/13/22         Plan:   1. Liberalizing lorazepam 0.5 mg to q 6 hr given severity of anxiety and level of patient distress.   2. Switching Quetiapine to Abilify 5 mg HS for now. Long term reduced risk of weight gain compared to Quetiapine and Abilify is more favorable if QTc prolongation is of concern. Adding Zydis 5 mg q 6 hr if there is breakthrough insomnia/agitation while going off Seroquel that is not covered by the Abilify. Abilify may need to be titrated gradually by 5 mg intervals every several days though re consult psychiatry given long half life it can gradually cause side effects over time.   --I would favor going to Sertraline 25 mg daily x 3 days, then 50 mg daily for depression/anxiety and discontinue mirtazapine given weight gain risk of mirtazapine. Her lack of appetite appears to be related to paranoia. In order not to make too many changes in one day will hold off adding sertraline yet.       Attestation:  Patient has been seen and evaluated by me,  Segundo Valderrama MD

## 2022-08-16 NOTE — PROGRESS NOTES
[facility]    Hospitalist Progress Note    Assessment & Plan   Cristy Bailey is a 77 year old female with a PMHx significant for morbid obesity, hypertension, and hyperlipidemia, who presented to [facility] 7/13/2022 with complete left-sided paralysis, left-sided facial droop, and difficulty speaking. CTA head remarkable for right MCA occlusion. She was given tenecteplase and subsequently transferred to [facility] 7/13/2022 for thrombectomy.     Hospital stay complicated by flank wound which required surgical debridement and placement of wound vac and findings LTC placement.     Acute right MCA ischemic stroke with edema and right to left midline shift  S/p tenecteplase and subsequent R MCA mechanical thrombectomy 7/13/22  * Presented to [facility] 7/13 with complete paralysis, left-sided facial droop, and aphasia. Code stroke initiated. Head CT 7/13 showed signs of an evolving R MCA infarct. CTA head 7/13 showed a right carotid terminus occlusion, right middle cerebral artery M1  segment occlusion with poor opacification of the more distal right MCA branches/poor collateral flow. CTA neck 7/13 negative for acute occlusions. Pt given tenecteplase 7/13 at 13:11. Noted to be agitated and was subsequently intubated given need for procedure.   * Subsequently transferred to [facility] 7/13/2022 where she underwent above procedure.   7/14: Extubated. Head CT 7/14 showed evolution of acute infarct involving the R MCA distribution with increased swelling, cortical effacement, and new mild right-to-left midline shift; questionable hypoattenuation involving the bilateral occipital lobes which could be artifactual.   * Additional stroke workup pursued this stay -- echo 7/14 showed EF 50%, grade 1 diastolic dysfunction   * Started ASA and atorvastatin per stroke team.   * Repeat head CT 7/15 showed evolving R MCA stroke with areas of edema, overall stable.  -- conts on full dose ASA and  statin  -- goal SBP <140/90  -- 30d cardiac event monitor at discharge  -- cont PT/OT, plan placement given ongoing therapy and wound vac needs  -- mgmt of dysphagia as below  -- will need to follow up with [facility] in 6-8 wks (due beginning of September)     Anxiety   -- cont Seroquel 12.5mg at dinner and 25mg HS, also has 12.5mg po q6h prn available  -- added mirtazapine 7.5mg on 8/15 evening given decreased appetite  -- psychiatry consulted for med eval     Urinary retention  Retaining urine on 8/14. UA WNL, not worrisome for infection  -- prn straight cath, if requires x3, place snyder cath     Dysphagia due to CVA  * Seen by SLP and noted with dysphagia. Required some intermittent fluid boluses.  -- cont mechanical soft diet per SLP recs     Dyslipidemia  * FLP this stay showed tot cholest 163, HDL 47, LDL 91, .   * Started on atorvastatin 40 mg daily     Volume overload dt diastolic CHF exacerbation: Improved  Essential hypertension  * Not on/needing meds PTA.   * As above, echo this stay showed EF 50% with grade I diastolic dysfunction; RV not well visualized but appeared mild-moderately dilated with global systolic function probably mildly reduced.   * BPs were initially soft this stay and required IVFs.   * BPs improved, ultimately developed pedal edema required IV Lasix   * Started on lisinopril this stay  -- cont lisinopril 10mg daily  -- cont Lasix BID (40mg po in AM, 20mg po in afternoon)     Prolonged QTc   * EKG on 7/13 showed QTc 494.   * Repeat EKG on 7/30 (while on Levaquin) showed QTc table at 475.   -- monitoring on telemetry this stay     Chronic bilateral LE edema with chronic venous stasis dermatitis and chronic skin changes  Hx of LLE cellulitis  * Was hospitalized in 11/2021 for sepsis dt to pneumonia and LLE cellulitis.   * During this stay, BLE noted to be patty and edematous, no unilateral leg swelling appreciated; LLE had patchy areas of erythema, no fluctuance, no painful areas with  palpation; legs warm to touch. Lymphedema consulted.   -- cont LE elevation, lymphedema wraps     Lower back/R flank wound/abscess, suspect dt pressure type injury, s/p surgical debridement on 7/30/22  OA  Hx of bilateral hip replacements   Hx of chronic neck and back pain  * Pt with significant back pain early on in hospital stay. Was requiring IV hydromorphone.  Dose had to be decreased dt somnolence.  * MRI L-spine in 2018 showed multilevel degenerative changes with mild central stenosis. Patient has chronic back pain, reports having it over the last 2 years.   * During this stay, patient was noted with 5-6cm by 2-3cm wound with swelling/fluid/blistering in a fold of her lower back, did not appear infected; this seemed to be the source of her pain. Padded dressing placed and WOC RN ordered. Pain initially improved.   * Was placed on course of Augmentin on 7/24.   * On 7/26, was re-evaluated by WOC RN. Wound appeared more erythematous and purulence expressed. Worsening with shear stress from lift. Procal <0.05, WBC WNL. Abx changed to IV clindamycin.  * Wound cultures obtained. On 7/28, wound grew proteus and staph aureus (MRSA neg). US neg for abscess.   * Lost IV access on 7/28 so abx were changed to oral clindamycin and oral levaquin. IV access re-established but was continued on oral abx.  * General surgery consulted, ultimately underwent excisional debridement of R flank abscess in OR on 7/30. Intraop cultures showed polymicrobial growth with proteus mirabilis x2 strains (both pan-sensitive), corynebacterium striatum and enterococcus faecalis.  * Wound vac placed per general surgery on 8/2  * ID consulted on 8/2 and abx narrowed to Augmentin alone, completed an additional 5 days of treatment on 8/7.   -- wound vac mgmt per WOC RN, MWF changes  -- prns available for pain  -- cont regular repositioning     Suspected meningioma left parietal region, incidentally seen on CT  * Head CT 7/13 showed a calcified  extra-axial mass overlying the left parietal region measuring 1.4 cm, potentially representing a meningioma.  * Will need serial monitoring OP after discharge.      Anemia, suspect dilutional component  * Hgb normal on admit. Hgb 11.5 on 7/16. No overt clinical signs of major bleeding.  * Hgb now stable at 12-13  -- monitor labs periodically      Intertriginous dermatitis  * Chronic and stable, cont clotrimazole powder     Constipation  * Continue sched bowel regimen     Hypokalemia: Resolved  Hypophosphatemia: Resolved  * Potassium and phosphorus low at times this hospitalization. Treated with replacement.  * Started daily 20meq KCl replacement this stay.     Morbid obesity  * BMI 59 on admission. Recommend aggressive dietary and lifestyle modifications as condition improves     Suspected sleep apnea  O2 drop to mid 80s overnight 8/14. Briefly placed on 4LPM, which she then removed and then slept okay with sats in 90s thereafter.  -- recommend sleep study as outpatient     Moderate malnutrition in context of acute illness and chronic disease  * Nutritionist following. On modified diet as above.      COVID-19 vaccination status  * Not vaccinated. Family contemplating vaccine.     FEN: no IVFs, lytes stable, mechanical soft diet  DVT Prophylaxis: Lovenox  Code Status: Full Code    Disposition: Medically stable for discharge. Discharge once LTC placement found. SW following.    Darby Martin, DO    Interval History   Seen this morning. Says she feels about this same. Some ongoing pain on her R side. Appetite still not great but no abd pain/n/v. No cp/sob/cough.     -Data reviewed today: I reviewed all new labs and imaging results over the last 24 hours. I personally reviewed no images or EKG's today.    Physical Exam   Temp: 98.8  F (37.1  C) Temp src: Oral BP: 118/61 Pulse: 77   Resp: 16 SpO2: 97 % O2 Device: None (Room air)    Vitals:    07/28/22 1508 08/05/22 0926 08/14/22 0600   Weight: (!) 159.2 kg (350  lb 14.4 oz) (!) 155.6 kg (343 lb) (!) 153.9 kg (339 lb 3.2 oz)     Vital Signs with Ranges  Temp:  [97.5  F (36.4  C)-98.8  F (37.1  C)] 98.8  F (37.1  C)  Pulse:  [74-77] 77  Resp:  [16-18] 16  BP: (107-118)/(49-61) 118/61  SpO2:  [95 %-97 %] 97 %  I/O last 3 completed shifts:  In: 400 [P.O.:400]  Out: 150 [Urine:150]    Constitutional: Resting comfortably, oriented to self, needed prompting with location/year, otherwise answering basic questions appropriately, NAD  Respiratory: CTAB, no wheeze/rale/srhonchi, no increased work of breathing  Cardiovascular: HRRR, no MGR, +bilateral LE edema but improved from prior exams  GI: obese, S, NT, ND, +BS  Skin/Integumen: warm/dry, wound vac in place over R flank, chronic venous stasis changes in bilateral LEs  Other:      Medications     - MEDICATION INSTRUCTIONS -         acetaminophen  975 mg Oral Q8H     aspirin  325 mg Oral Daily     atorvastatin  40 mg Oral QPM     diclofenac  2 g Topical 4x Daily     enoxaparin ANTICOAGULANT  40 mg Subcutaneous Q12H     furosemide  20 mg Oral Daily at 4 pm     furosemide  40 mg Oral Daily     lactobacillus rhamnosus (GG)  1 capsule Oral BID     lisinopril  10 mg Oral Daily     miconazole   Topical BID     mirtazapine  7.5 mg Oral At Bedtime     multivitamin, therapeutic  1 tablet Oral Daily     polyethylene glycol  17 g Oral Daily     potassium chloride  20 mEq Oral Daily     pramoxine   Topical TID     QUEtiapine  12.5 mg Oral Daily with supper     QUEtiapine  25 mg Oral At Bedtime     senna-docusate  1-2 tablet Oral BID       Data   Recent Labs   Lab 08/15/22  0743 08/14/22  0636 08/12/22  0741 08/11/22  0836 08/10/22  0809   WBC 6.7  --   --   --   --    HGB 13.3  --   --   --   --    MCV 88  --   --   --   --     326  --  375  --    POTASSIUM  --   --  3.7 3.5 3.5   CR  --  0.72  --  0.72  --        No results found for this or any previous visit (from the past 24 hour(s)).

## 2022-08-17 NOTE — PLAN OF CARE
Goal Outcome Evaluation:    Plan of Care Reviewed With: patient     Overall Patient Progress: no change    Pt here with R MCA infarct, post thrombectomy and tnk. A&Ox2, disoriented to time and situation. Neuros stable, generalized weakness. RA. Tele SR. Reg diet, thin liquids. Encouraged intake but pt only ate a couple bites of her pasta and two bites of her bela food cake. Takes pills whole. Up with A2 lift. Turn and repo. Pt with pain at back and legs especially, improved with frequent repositioning and administration of scheduled and PRN meds. Wound vac in place, ressing CDI. Skin and wound care provided as ordered- shreyas and antifungal to perineal area, heel floated on pillows as pt allows, reposition and turned as pt allows. Pt scoring green on the Aggression Stop Light Tool. Plan TCU vs long term care, placement to manage wound vac.

## 2022-08-17 NOTE — PLAN OF CARE
Date & Time: 8/17 4607-3620  Diagnosis: CVA  Procedures: 7/13 - Thrombectomy & TNK  Orientation/Cognitive: AOx2, very anxious - PRN ativan or zyprexa   VS/O2: VSS, RA  Mobility: A2-3 + lift, turn & repo   Diet: Regular - very little appetite, refused all ensure/jellos/protein supplements   Pain Management: PRN oxycodone, scheduled tylenol & voltaren   Bowel & Bladder: Incontinent, purewick patent   Skin: Jules & red all over, sores scattered, R flank wound w/ wound vac  Abnormal Labs: WDL  Tele: NSR  IV Access/Drips/Fluids: No PIV  Drains: Purewick & wound vac  Tests: NA  Consults: Hospitalist, general surgery, psych   Discharge Plan: Pending - TCU vs LTC, needs MA

## 2022-08-17 NOTE — PLAN OF CARE
Reason for Admission: R MCA, post thrombectomy and TNK  Cognitive/Mentation: A/Ox 2, disoriented to time and place.   Neuros/CMS: Stable with generalized weakness, confusion/forgetfulness.   VS: VSS on RA  Tele: NSR  GI: BS audible, + flatus, no BM this shift. Continent.  : Purewick in place, due to void. Bladder scanned for 205 mL at 0430. Incontinent.   Pulmonary: LS clear  Pain: Back and leg pain, improved with frequent repositioning and PRN meds.   Drains/Lines: Wound vac in place   Skin: Wound vac on RL back -patient and dressing CDI, multiple back wounds covered with Mepilex, raw rash/redness in pannus/perineum area,bilateral skin ruddiness, +3 edema on bilateral legs/feet/ankles. T/R q2hr.   Activity: Assist x 2 with lift device  Diet: Regular diet with thin liquids. Takes pills whole with water.   Therapies recs: TCU vs Long term care  Discharge: Pending placement/ability to manage wound vac.   Aggression Stoplight Tool: Green  End of shift summary: No changes this shift. Frequent repositioning maintained, refused turning R this morning.

## 2022-08-17 NOTE — PROGRESS NOTES
"[facility]  WO Nurse Inpatient Assessment     Consulted for: vac dressing changes to Right lower back wound (Stage 3 HAPI)     Pt hx per chart: Cristy Bailey is a 77 year old female with a PMHx significant for morbid obesity, hypertension (not on/needing meds), and hyperlipidemia (not on/needing meds), who presented to [facility] 7/13/2022 with complete left-sided paralysis, left-sided facial droop, and difficulty speaking. CTA head remarkable for right MCA occlusion. She was given tenecteplase and subsequently transferred to [facility] 7/13/2022 for thrombectomy.     Areas Assessed:      Areas visualized during today's visit: back    Wound location: Right lower back in waist crease    Last Photo: 8/17/22      Wound due to: Hospital Acquired Pressure injury stage 3   Stage: per prior charting \"Unstageable 7/28, Following surgical debridement 7/30 Stage 3 Pressure Injury and Moisture Associated Skin Damage (MASD), suspect intertriginous pressure, appears to be deeper tissue damage within a crease, possible shear component from lift.\"  Wound history/plan of care: Pt frequently refusing repositioning and turning due to back pain.  Wound first noted 7/16 as a blister and maroon area, then deteriorated and needed I&D      Surgical date: 7-30-22 Dr. Black     Date Negative Pressure Wound Therapy initiated: 8-2-22     Interventions in place: repositioning, specialty surface in use, moisture/incontinence management and offloading    Is patient s nutritional status compromised? yes   a. If yes, what interventions are in place? Protein supplements    Reason for initiating vac therapy? Presence of co-morbidities, High risk of infections and Need for accelerated granulation tissue    Which?of?the?following?co-morbidities?apply? Obesity  a. If diabetic is patient on a diabetic management program? N/A     Is osteomyelitis present in wound? no  a.  If yes what treatments are in place? " N/A    Wound base: red moist granular tissue     Palpation of the wound bed: normal       Drainage: moderate      Description of drainage: serosanguinous      Measurements (length x width x depth, in cm) approx 2.5 x 15.5 x 0.5cm     Tunneling N/A      Undermining approx 1-2cm near 8-10 o'clock   Periwound skin: scattered small area of necrosis along wound edge at 8 o clock that are evolving and currently moist red and tan; general mild erythema and maceration      Color: pink       Temperature: normal to warm and sweaty  Odor: minimal  Pain: moderate to briefly severe at times  Pain intervention prior to dressing change: premedicated with oxycodone and ativan; distraction techniques   Treatment goal: Heal  and Increase granulation  STATUS: improving   Supplies ordered: at bedside      Treatment Plan:     Negative pressure wound therapy plan:  Wound location: right lower back   Change Days: Mon/Wed/Fri by WOC RN    Supplies (including all accessories) used: medium Black foam   Cleanse with Vashe prior to replacing VAC    Suction setting: -125   Methods used: Window paned all periwound skin with vac drape prior to applying sponge and Cut foam in half to reduce profile    Staff RN to assess integrity of dressing and ensure suction is set at appropriate level every shift.   Date canister. Chart canister output every shift. Change cannister weekly and PRN if full/occluded     Remove foam dressing and replace with BID normal saline or Vashe moist gauze dressing if:   -a dressing failure which cannot be repaired within 2 hours   -patient is discharging to home without a home pump   -patient is discharging to a facility outside the local area      The hospital VAC pump is not to be discharged with the patient.?Ensure to disconnect patient from machine prior to discharge. Then,    - If a home KCI VAC pump has been delivered, connect home cannister to dressing tubing then connect cannister to home pump and turn on machine     - If transferring to a nearby facility with a KCI vac, can disconnect and clamp tubing then cover end with glove so can be reconnected within 2 hours  \    Orders: Reviewed    RECOMMEND PRIMARY TEAM ORDER: None, at this time  Education provided: importance of repositioning, plan of care and Off-loading pressure  Discussed plan of care with: Patient and Nurse  WOC nurse follow-up plan: Monday/WednesdayFriday  Notify WOC if wound(s) deteriorate.  Nursing to notify the Provider(s) and re-consult the WOC Nurse if new skin concern.    DATA:     Current support surface: Bariatric Low air loss mattress    Containment of urine/stool: Incontinence Protocol and Incontinent pad in bed  BMI: Body mass index is 54.75 kg/m .   Active diet order: Orders Placed This Encounter      Advance Diet as Tolerated      Regular Diet Adult Thin Liquids (level 0) (Upright position, alert, and assist as needed)     Output: I/O last 3 completed shifts:  In: 200 [P.O.:200]  Out: 300 [Urine:300]     Labs:   Recent Labs   Lab 08/15/22  0743   HGB 13.3   WBC 6.7     Pressure injury risk assessment:   Sensory Perception: 3-->slightly limited  Moisture: 3-->occasionally moist  Activity: 1-->bedfast  Mobility: 2-->very limited  Nutrition: 2-->probably inadequate  Friction and Shear: 2-->potential problem  Demetrio Score: 13    Kiera LREFREN   Dept. Pager: [phone]  Dept. Office Number: [phone]

## 2022-08-17 NOTE — PROGRESS NOTES
[facility]    Hospitalist Progress Note    Assessment & Plan   Cristy Bailey is a 77 year old female with a PMHx significant for morbid obesity, hypertension, and hyperlipidemia, who presented to [facility] 7/13/2022 with complete left-sided paralysis, left-sided facial droop, and difficulty speaking. CTA head remarkable for right MCA occlusion. She was given tenecteplase and subsequently transferred to [facility] 7/13/2022 for thrombectomy.     Hospital stay complicated by flank wound which required surgical debridement and placement of wound vac and findings LTC placement.     Acute right MCA ischemic stroke with edema and right to left midline shift  S/p tenecteplase and subsequent R MCA mechanical thrombectomy 7/13/22  * Presented to [facility] 7/13 with complete paralysis, left-sided facial droop, and aphasia. Code stroke initiated. Head CT 7/13 showed signs of an evolving R MCA infarct. CTA head 7/13 showed a right carotid terminus occlusion, right middle cerebral artery M1  segment occlusion with poor opacification of the more distal right MCA branches/poor collateral flow. CTA neck 7/13 negative for acute occlusions. Pt given tenecteplase 7/13 at 13:11. Noted to be agitated and was subsequently intubated given need for procedure.   * Subsequently transferred to [facility] 7/13/2022 where she underwent above procedure.   7/14: Extubated. Head CT 7/14 showed evolution of acute infarct involving the R MCA distribution with increased swelling, cortical effacement, and new mild right-to-left midline shift; questionable hypoattenuation involving the bilateral occipital lobes which could be artifactual.   * Additional stroke workup pursued this stay -- echo 7/14 showed EF 50%, grade 1 diastolic dysfunction   * Started ASA and atorvastatin per stroke team.   * Repeat head CT 7/15 showed evolving R MCA stroke with areas of edema, overall stable.  -- conts on full dose ASA and  statin  -- goal SBP <140/90  -- 30d cardiac event monitor at discharge  -- cont PT/OT, plan placement given ongoing therapy and wound vac needs  -- mgmt of dysphagia as below  -- will need to follow up with [facility] in 6-8 wks (due beginning of September)     Anxiety   * Ongoing issues with anxiety this stay -- had been on regimen of Seroquel 12.5mg at dinner and 25mg HS with 12.5mg po q6h prn available. Mirtazapine 7.5mg on 8/15 evening given decreased appetite  * Psych consulted for assistance with ongoing mgmt -- seen on 8/16 and meds adjusted. Seroquel HS changed to Abilify 5mg HS. Recommended Zyptexa 5mg q6h prn for breakthrough insomnia/agitation while going off Seroquel. Consider uptitation of Abilify per psych recs. Advised uptitration of sertraline (25mg x3d then increase to 50mg daily) and Remeron stopped. Also advised to liberalize Ativan to 0.5mg q6h prn.   -- cont current regimen of meds including Abilify 5mg every evening, Zyprexa 2.5mg HS and sertraline (25mg daily 8/17-8/19 then increase to 50mg daily on 8/20)  -- cont prns per psych including Ativan 0.5mg q6h prn, Zyprexa 5mg q6h prn  -- consider psych reconsult in coming days to uptitrate Abilify      Urinary retention  Retaining urine on 8/14. UA WNL, not worrisome for infection  -- prn straight cath, if requires x3, place snyder cath     Dysphagia due to CVA  * Seen by SLP and noted with dysphagia. Required some intermittent fluid boluses.  -- cont mechanical soft diet per SLP recs     Dyslipidemia  * FLP this stay showed tot cholest 163, HDL 47, LDL 91, .   * Started on atorvastatin 40 mg daily     Volume overload dt diastolic CHF exacerbation: Improved  Essential hypertension  * Not on/needing meds PTA.   * As above, echo this stay showed EF 50% with grade I diastolic dysfunction; RV not well visualized but appeared mild-moderately dilated with global systolic function probably mildly reduced.   * BPs were initially soft this stay and required  IVFs.   * BPs improved, ultimately developed pedal edema required IV Lasix   * Started on lisinopril this stay  -- cont lisinopril 10mg daily  -- cont Lasix BID (40mg po in AM, 20mg po in afternoon)     Prolonged QTc   * EKG on 7/13 showed QTc 494.   * Repeat EKG on 7/30 (while on Levaquin) showed QTc table at 475.   -- monitoring on telemetry this stay     Chronic bilateral LE edema with chronic venous stasis dermatitis and chronic skin changes  Hx of LLE cellulitis  * Was hospitalized in 11/2021 for sepsis dt to pneumonia and LLE cellulitis.   * During this stay, BLE noted to be patty and edematous, no unilateral leg swelling appreciated; LLE had patchy areas of erythema, no fluctuance, no painful areas with palpation; legs warm to touch. Lymphedema consulted.   -- cont LE elevation, lymphedema wraps     Lower back/R flank wound/abscess, suspect dt pressure type injury, s/p surgical debridement on 7/30/22  OA  Hx of bilateral hip replacements   Hx of chronic neck and back pain  * Pt with significant back pain early on in hospital stay. Was requiring IV hydromorphone.  Dose had to be decreased dt somnolence.  * MRI L-spine in 2018 showed multilevel degenerative changes with mild central stenosis. Patient has chronic back pain, reports having it over the last 2 years.   * During this stay, patient was noted with 5-6cm by 2-3cm wound with swelling/fluid/blistering in a fold of her lower back, did not appear infected; this seemed to be the source of her pain. Padded dressing placed and WOC RN ordered. Pain initially improved.   * Was placed on course of Augmentin on 7/24.   * On 7/26, was re-evaluated by WOC RN. Wound appeared more erythematous and purulence expressed. Worsening with shear stress from lift. Procal <0.05, WBC WNL. Abx changed to IV clindamycin.  * Wound cultures obtained. On 7/28, wound grew proteus and staph aureus (MRSA neg). US neg for abscess.   * Lost IV access on 7/28 so abx were changed to oral  clindamycin and oral levaquin. IV access re-established but was continued on oral abx.  * General surgery consulted, ultimately underwent excisional debridement of R flank abscess in OR on 7/30. Intraop cultures showed polymicrobial growth with proteus mirabilis x2 strains (both pan-sensitive), corynebacterium striatum and enterococcus faecalis.  * Wound vac placed per general surgery on 8/2  * ID consulted on 8/2 and abx narrowed to Augmentin alone, completed an additional 5 days of treatment on 8/7.   -- wound vac mgmt per WOC RN, MWF changes  -- prns available for pain  -- cont regular repositioning     Suspected meningioma left parietal region, incidentally seen on CT  * Head CT 7/13 showed a calcified extra-axial mass overlying the left parietal region measuring 1.4 cm, potentially representing a meningioma.  * Will need serial monitoring OP after discharge.      Anemia, suspect dilutional component  * Hgb normal on admit. Hgb 11.5 on 7/16. No overt clinical signs of major bleeding.  * Hgb now stable at 12-13  -- monitor labs periodically      Intertriginous dermatitis  * Chronic and stable, cont clotrimazole powder     Constipation  * Continue sched bowel regimen     Hypokalemia: Resolved  Hypophosphatemia: Resolved  * Potassium and phosphorus low at times this hospitalization. Treated with replacement.  * Started daily 20meq KCl replacement this stay.     Morbid obesity  * BMI 59 on admission. Recommend aggressive dietary and lifestyle modifications as condition improves     Suspected sleep apnea  O2 drop to mid 80s overnight 8/14. Briefly placed on 4LPM, which she then removed and then slept okay with sats in 90s thereafter.  -- recommend sleep study as outpatient     Moderate malnutrition in context of acute illness and chronic disease  * Nutritionist following. On modified diet as above.      COVID-19 vaccination status  * Not vaccinated. Family contemplating vaccine.     FEN: no IVFs, lytes stable,  mechanical soft diet  DVT Prophylaxis: Lovenox  Code Status: Full Code    Disposition: Medically stable for discharge. Discharge once LTC placement found. SW following.    Darby Martin, DO    Interval History    Medications adjusted per psych yesterday. Had better night's sleep. Seen this AM. Getting up to chair with lift. Reports pain in her R flank. No pain elsewhere. No cp/sob/cough, abd pain/n/v. Appetite remains poor.     -Data reviewed today: I reviewed all new labs and imaging results over the last 24 hours. I personally reviewed no images or EKG's today.    Physical Exam   Temp: 98.2  F (36.8  C) Temp src: Oral BP: 104/49 Pulse: 65   Resp: 16 SpO2: 97 % O2 Device: None (Room air)    Vitals:    07/28/22 1508 08/05/22 0926 08/14/22 0600   Weight: (!) 159.2 kg (350 lb 14.4 oz) (!) 155.6 kg (343 lb) (!) 153.9 kg (339 lb 3.2 oz)     Vital Signs with Ranges  Temp:  [97.3  F (36.3  C)-98.2  F (36.8  C)] 98.2  F (36.8  C)  Pulse:  [65-72] 65  Resp:  [16-18] 16  BP: ()/(45-66) 104/49  SpO2:  [95 %-99 %] 97 %  I/O last 3 completed shifts:  In: 200 [P.O.:200]  Out: 300 [Urine:300]    Constitutional: Alert, oriented to self, needed prompting with location/year, otherwise answering basic questions appropriately, uncomfortable but NAD  Respiratory: CTAB, no wheeze/rale/srhonchi, no increased work of breathing  Cardiovascular: HRRR, no MGR, +bilateral LE edema but improved from prior exams  GI: obese, S, NT, ND, +BS  Skin/Integumen: warm/dry, wound vac in place over R flank, chronic venous stasis changes in bilateral LEs  Other:      Medications     - MEDICATION INSTRUCTIONS -         acetaminophen  975 mg Oral Q8H     ARIPiprazole  5 mg Oral QPM     aspirin  325 mg Oral Daily     atorvastatin  40 mg Oral QPM     diclofenac  2 g Topical 4x Daily     enoxaparin ANTICOAGULANT  40 mg Subcutaneous Q12H     furosemide  20 mg Oral Daily at 4 pm     furosemide  40 mg Oral Daily     lactobacillus rhamnosus (GG)  1  capsule Oral BID     lisinopril  10 mg Oral Daily     miconazole   Topical BID     mirtazapine  7.5 mg Oral At Bedtime     multivitamin, therapeutic  1 tablet Oral Daily     OLANZapine  2.5 mg Oral At Bedtime     polyethylene glycol  17 g Oral Daily     potassium chloride  20 mEq Oral Daily     pramoxine   Topical TID     senna-docusate  1-2 tablet Oral BID       Data   Recent Labs   Lab 08/17/22  0820 08/15/22  0743 08/14/22  0636 08/12/22  0741 08/11/22  0836   WBC  --  6.7  --   --   --    HGB  --  13.3  --   --   --    MCV  --  88  --   --   --     332 326  --  375   POTASSIUM  --   --   --  3.7 3.5   CR 0.63  --  0.72  --  0.72       No results found for this or any previous visit (from the past 24 hour(s)).

## 2022-08-18 NOTE — PLAN OF CARE
"Goal Outcome Evaluation:    Plan of Care Reviewed With: patient, significant other     Overall Patient Progress: improving    Outcome Evaluation: cooperative today, continuing to work on placement, may need COVID vaccine first       Pt oriented to self, \"hospital.\" Disoriented to time & situation and is very forgetful and/or possibly has auditory or visual hallucinations--frequently talked to / said she was sure her daughter was behind the curtain; at one point asked about the cat in her room, and also about a Baboon.  Fair PO intake today, drank fluids better when RN gave pills by spoon one at a time; purewick in place but did not void until up to commode (via lift)--300mL output.  Cares to LE's and pannus complete.  R lower back / flank HAPI wound vac in place and functioning, did need to reinforce dressing once this shift due to edge peeling back.  Able to participate in the 'once daily' neuro checks except for heel to shin (LE weakness, LLE weaker than RLE).    "

## 2022-08-18 NOTE — PROGRESS NOTES
[facility]    Hospitalist Progress Note    Assessment & Plan   Cristy Bailey is a 77 year old female with a PMHx significant for morbid obesity, hypertension, and hyperlipidemia, who presented to [facility] 7/13/2022 with complete left-sided paralysis, left-sided facial droop, and difficulty speaking. CTA head remarkable for right MCA occlusion. She was given tenecteplase and subsequently transferred to [facility] 7/13/2022 for thrombectomy.     Hospital stay complicated by flank wound which required surgical debridement and placement of wound vac and findings LTC placement.     Acute right MCA ischemic stroke with edema and right to left midline shift  S/p tenecteplase and subsequent R MCA mechanical thrombectomy 7/13/22  * Presented to [facility] 7/13 with complete paralysis, left-sided facial droop, and aphasia. Code stroke initiated. Head CT 7/13 showed signs of an evolving R MCA infarct. CTA head 7/13 showed a right carotid terminus occlusion, right middle cerebral artery M1  segment occlusion with poor opacification of the more distal right MCA branches/poor collateral flow. CTA neck 7/13 negative for acute occlusions. Pt given tenecteplase 7/13 at 13:11. Noted to be agitated and was subsequently intubated given need for procedure.   * Subsequently transferred to [facility] 7/13/2022 where she underwent above procedure.   7/14: Extubated. Head CT 7/14 showed evolution of acute infarct involving the R MCA distribution with increased swelling, cortical effacement, and new mild right-to-left midline shift; questionable hypoattenuation involving the bilateral occipital lobes which could be artifactual.   * Additional stroke workup pursued this stay -- echo 7/14 showed EF 50%, grade 1 diastolic dysfunction   * Started ASA and atorvastatin per stroke team.   * Repeat head CT 7/15 showed evolving R MCA stroke with areas of edema, overall stable.  -- conts on full dose ASA and  statin  -- goal SBP <140/90  -- 30d cardiac event monitor at discharge  -- cont PT/OT, plan placement given ongoing therapy and wound vac needs  -- mgmt of dysphagia as below  -- will need to follow up with [facility] in 6-8 wks (due beginning of September)     Anxiety   * Ongoing issues with anxiety this stay -- had been on regimen of Seroquel 12.5mg at dinner and 25mg HS with 12.5mg po q6h prn available. Mirtazapine 7.5mg on 8/15 evening given decreased appetite  * Psych consulted for assistance with ongoing mgmt -- seen on 8/16 and meds adjusted. Seroquel HS changed to Abilify 5mg HS. Recommended Zyptexa 5mg q6h prn for breakthrough insomnia/agitation while going off Seroquel. Consider uptitation of Abilify per psych recs. Advised uptitration of sertraline (25mg x3d then increase to 50mg daily) and Remeron stopped. Also advised to liberalize Ativan to 0.5mg q6h prn.   -- cont current regimen of meds including Abilify 5mg every evening, Zyprexa 2.5mg HS and sertraline (25mg daily 8/17-8/19 then increase to 50mg daily on 8/20)  -- cont prns per psych including Ativan 0.5mg q6h prn, Zyprexa 5mg q6h prn  -- consider psych reconsult in coming days to uptitrate Abilify      Urinary retention  Retaining urine on 8/14. UA WNL, not worrisome for infection  -- prn straight cath, if requires x3, place snyder cath     Dysphagia due to CVA  * Seen by SLP and noted with dysphagia. Required some intermittent fluid boluses.  -- cont mechanical soft diet per SLP recs     Dyslipidemia  * FLP this stay showed tot cholest 163, HDL 47, LDL 91, .   * Started on atorvastatin 40 mg daily     Volume overload dt diastolic CHF exacerbation: Improved  Essential hypertension  * Not on/needing meds PTA.   * As above, echo this stay showed EF 50% with grade I diastolic dysfunction; RV not well visualized but appeared mild-moderately dilated with global systolic function probably mildly reduced.   * BPs were initially soft this stay and required  IVFs.   * BPs improved, ultimately developed pedal edema required IV Lasix   * Started on lisinopril this stay  -- cont lisinopril 10mg daily  -- cont Lasix BID (40mg po in AM, 20mg po in afternoon)     Prolonged QTc   * EKG on 7/13 showed QTc 494.   * Repeat EKG on 7/30 (while on Levaquin) showed QTc table at 475.   -- monitoring on telemetry this stay     Chronic bilateral LE edema with chronic venous stasis dermatitis and chronic skin changes  Hx of LLE cellulitis  * Was hospitalized in 11/2021 for sepsis dt to pneumonia and LLE cellulitis.   * During this stay, BLE noted to be patty and edematous, no unilateral leg swelling appreciated; LLE had patchy areas of erythema, no fluctuance, no painful areas with palpation; legs warm to touch. Lymphedema consulted.   -- cont LE elevation, lymphedema wraps     Lower back/R flank wound/abscess, suspect dt pressure type injury, s/p surgical debridement on 7/30/22  OA  Hx of bilateral hip replacements   Hx of chronic neck and back pain  * Pt with significant back pain early on in hospital stay. Was requiring IV hydromorphone.  Dose had to be decreased dt somnolence.  * MRI L-spine in 2018 showed multilevel degenerative changes with mild central stenosis. Patient has chronic back pain, reports having it over the last 2 years.   * During this stay, patient was noted with 5-6cm by 2-3cm wound with swelling/fluid/blistering in a fold of her lower back, did not appear infected; this seemed to be the source of her pain. Padded dressing placed and WOC RN ordered. Pain initially improved.   * Was placed on course of Augmentin on 7/24.   * On 7/26, was re-evaluated by WOC RN. Wound appeared more erythematous and purulence expressed. Worsening with shear stress from lift. Procal <0.05, WBC WNL. Abx changed to IV clindamycin.  * Wound cultures obtained. On 7/28, wound grew proteus and staph aureus (MRSA neg). US neg for abscess.   * Lost IV access on 7/28 so abx were changed to oral  clindamycin and oral levaquin. IV access re-established but was continued on oral abx.  * General surgery consulted, ultimately underwent excisional debridement of R flank abscess in OR on 7/30. Intraop cultures showed polymicrobial growth with proteus mirabilis x2 strains (both pan-sensitive), corynebacterium striatum and enterococcus faecalis.  * Wound vac placed per general surgery on 8/2  * ID consulted on 8/2 and abx narrowed to Augmentin alone, completed an additional 5 days of treatment on 8/7.   -- wound vac mgmt per WOC RN, MWF changes  -- prns available for pain  -- cont regular repositioning     Suspected meningioma left parietal region, incidentally seen on CT  * Head CT 7/13 showed a calcified extra-axial mass overlying the left parietal region measuring 1.4 cm, potentially representing a meningioma.  * Will need serial monitoring OP after discharge.      Anemia, suspect dilutional component  * Hgb normal on admit. Hgb 11.5 on 7/16. No overt clinical signs of major bleeding.  * Hgb now stable at 12-13  -- monitor labs periodically      Intertriginous dermatitis  * Chronic and stable, cont clotrimazole powder     Constipation  * Continue sched bowel regimen     Hypokalemia: Resolved  Hypophosphatemia: Resolved  * Potassium and phosphorus low at times this hospitalization. Treated with replacement.  * Started daily 20meq KCl replacement this stay.     Morbid obesity  * BMI 59 on admission. Recommend aggressive dietary and lifestyle modifications as condition improves     Suspected sleep apnea  O2 drop to mid 80s overnight 8/14. Briefly placed on 4LPM, which she then removed and then slept okay with sats in 90s thereafter.  -- recommend sleep study as outpatient     Moderate malnutrition in context of acute illness and chronic disease  * Nutritionist following. On modified diet as above.      COVID-19 vaccination status  * Not vaccinated. Family contemplating vaccine.     FEN: no IVFs, lytes stable,  mechanical soft diet  DVT Prophylaxis: Lovenox  Code Status: Full Code    Disposition: Medically stable for discharge. Discharge once LTC placement found. SW following.    Darby Martin, DO    Interval History    Seen this morning. Seems to be in better spirits this morning! Alert and oriented to self/location. No specific complaints. Denies pain at present. No cp/sob/cough. Appetite remains poor, needing encouragement. No abd pain/n/v.      -Data reviewed today: I reviewed all new labs and imaging results over the last 24 hours. I personally reviewed no images or EKG's today.    Physical Exam   Temp: 97.7  F (36.5  C) Temp src: Oral BP: (!) 84/59 Pulse: 73   Resp: 16 SpO2: 97 % O2 Device: None (Room air)    Vitals:    07/28/22 1508 08/05/22 0926 08/14/22 0600   Weight: (!) 159.2 kg (350 lb 14.4 oz) (!) 155.6 kg (343 lb) (!) 153.9 kg (339 lb 3.2 oz)     Vital Signs with Ranges  Temp:  [97.4  F (36.3  C)-98.2  F (36.8  C)] 97.7  F (36.5  C)  Pulse:  [65-80] 73  Resp:  [16] 16  BP: ()/(48-78) 84/59  SpO2:  [95 %-97 %] 97 %  I/O last 3 completed shifts:  In: 660 [P.O.:660]  Out: 350 [Urine:350]    Constitutional: Resting comfortably, alert and oriented x2 to self/location (does not know the year -- this seems to be her baseline this stay), answering basic questions appropriately, uncomfortable but NAD  Respiratory: CTAB, no wheeze/rale/srhonchi, no increased work of breathing  Cardiovascular: HRRR, no MGR, +bilateral LE edema but improved from prior exams  GI: obese, S, NT, ND, +BS  Skin/Integumen: warm/dry, wound vac in place over R flank, chronic venous stasis changes in bilateral LEs  Other:      Medications     - MEDICATION INSTRUCTIONS -         acetaminophen  975 mg Oral Q8H     ARIPiprazole  5 mg Oral QPM     aspirin  325 mg Oral Daily     atorvastatin  40 mg Oral QPM     diclofenac  2 g Topical 4x Daily     enoxaparin ANTICOAGULANT  40 mg Subcutaneous Q12H     furosemide  20 mg Oral Daily at 4  pm     furosemide  40 mg Oral Daily     lactobacillus rhamnosus (GG)  1 capsule Oral BID     lisinopril  10 mg Oral Daily     miconazole   Topical BID     multivitamin, therapeutic  1 tablet Oral Daily     OLANZapine  2.5 mg Oral At Bedtime     polyethylene glycol  17 g Oral Daily     potassium chloride  20 mEq Oral Daily     pramoxine   Topical TID     senna-docusate  1-2 tablet Oral BID     sertraline  25 mg Oral Daily     [START ON 8/20/2022] sertraline  50 mg Oral Daily       Data   Recent Labs   Lab 08/17/22  0820 08/15/22  0743 08/14/22  0636 08/12/22  0741   WBC  --  6.7  --   --    HGB  --  13.3  --   --    MCV  --  88  --   --     332 326  --    POTASSIUM  --   --   --  3.7   CR 0.63  --  0.72  --        No results found for this or any previous visit (from the past 24 hour(s)).

## 2022-08-18 NOTE — PLAN OF CARE
Reason for Admission: R MCA, post thrombectomy and TNK  Cognitive/Mentation: A/Ox 2, disoriented to time and place.   Neuros/CMS: Stable with generalized weakness, confusion/forgetfulness.   VS: VSS on RA  Tele: NSR  GI: BS audible, + flatus, no BM this shift. Continent.  : Purewick in place, due to void. Incontinent.   Pulmonary: LS clear  Pain: Back and leg pain, improved with frequent repositioning and PRN meds.   Drains/Lines: Wound vac in place   Skin: Wound vac on RL back -patient and dressing CDI, multiple back wounds covered with Mepilex, raw rash/redness in pannus/perineum area,bilateral skin ruddiness, +3 edema on bilateral legs/feet/ankles. T/R q2hr.   Activity: Assist x 2 with lift device  Diet: Regular diet with thin liquids. Takes pills whole with water.   Therapies recs: TCU vs Long term care  Discharge: Pending placement/ability to manage wound vac.   Aggression Stoplight Tool: Green  End of shift summary: No changes this shift. Frequent repositioning maintained. Pt was agitated/confused this morning, refusing prn meds and Purewick.

## 2022-08-18 NOTE — PLAN OF CARE
Pt is alert to self and occasionally place, does not have much of an appetite, Ax2-3 w/ lift. Crushed pills in applesauce and pudding. Hallucinating family members in room. Talking to things/people that aren't there. Wound vac reinforced. Last leg cares done. T&R as allowed, Pt refuses at times. Incontinent x1, cleaned up.

## 2022-08-19 NOTE — PLAN OF CARE
"Speech Language Therapy Discharge Summary    Reason for therapy discharge:    No further expectations of functional progress.  Limited intake and participation. Pt at her baseline.    Progress towards therapy goal(s). See goals on Care Plan in [facility] electronic health record for goal details.  Goals met    Therapy recommendation(s):    No further therapy is recommended.\"Patient continues to have poor oral intake. She is appropriate for diet upgrade to regular textures, continue thin liquids. Assist with meal tray prep and feeding as needed.\"      "

## 2022-08-19 NOTE — PLAN OF CARE
Goal Outcome Evaluation:        Patent refusing meds, repositioning, and some assessments throughout the shift.

## 2022-08-19 NOTE — PROGRESS NOTES
[facility]    Hospitalist Progress Note    Assessment & Plan   Cristy Bailey is a 77 year old female with a PMHx significant for morbid obesity, hypertension, and hyperlipidemia, who presented to [facility] 7/13/2022 with complete left-sided paralysis, left-sided facial droop, and difficulty speaking. CTA head remarkable for right MCA occlusion. She was given tenecteplase and subsequently transferred to [facility] 7/13/2022 for thrombectomy.     Hospital stay complicated by flank wound which required surgical debridement and placement of wound vac and findings LTC placement.     Acute right MCA ischemic stroke with edema and right to left midline shift  S/p tenecteplase and subsequent R MCA mechanical thrombectomy 7/13/22  * Presented to [facility] 7/13 with complete paralysis, left-sided facial droop, and aphasia. Code stroke initiated. Head CT 7/13 showed signs of an evolving R MCA infarct. CTA head 7/13 showed a right carotid terminus occlusion, right middle cerebral artery M1  segment occlusion with poor opacification of the more distal right MCA branches/poor collateral flow. CTA neck 7/13 negative for acute occlusions. Pt given tenecteplase 7/13 at 13:11. Noted to be agitated and was subsequently intubated given need for procedure.   * Subsequently transferred to [facility] 7/13/2022 where she underwent above procedure.   7/14: Extubated. Head CT 7/14 showed evolution of acute infarct involving the R MCA distribution with increased swelling, cortical effacement, and new mild right-to-left midline shift; questionable hypoattenuation involving the bilateral occipital lobes which could be artifactual.   * Additional stroke workup pursued this stay -- echo 7/14 showed EF 50%, grade 1 diastolic dysfunction   * Started ASA and atorvastatin per stroke team.   * Repeat head CT 7/15 showed evolving R MCA stroke with areas of edema, overall stable.  -- conts on full dose ASA and  statin  -- goal SBP <140/90  -- 30d cardiac event monitor at discharge  -- cont PT/OT, plan placement given ongoing therapy and wound vac needs  -- mgmt of dysphagia as below  -- will need to follow up with [facility] in 6-8 wks (due beginning of September)     Anxiety   * Ongoing issues with anxiety this stay -- had been on regimen of Seroquel 12.5mg at dinner and 25mg HS with 12.5mg po q6h prn available. Mirtazapine 7.5mg on 8/15 evening given decreased appetite  * Psych consulted for assistance with ongoing mgmt -- seen on 8/16 and meds adjusted. Seroquel HS changed to Abilify 5mg HS. Recommended Zyptexa 5mg q6h prn for breakthrough insomnia/agitation while going off Seroquel. Consider uptitation of Abilify per psych recs. Advised uptitration of sertraline (25mg x3d then increase to 50mg daily) and Remeron stopped. Also advised to liberalize Ativan to 0.5mg q6h prn.   -- cont current regimen of meds including Abilify 5mg every evening, Zyprexa 2.5mg HS and sertraline (25mg daily 8/17-8/19 then increase to 50mg daily on 8/20)  -- cont prns per psych including Ativan 0.5mg q6h prn, Zyprexa 5mg q6h prn  -- consider psych reconsult in coming days to uptitrate Abilify      Urinary retention  * Retaining urine on 8/14. UA WNL, not worrisome for infection  -- prn straight cath, if requires x3, place snyder cath     Dysphagia due to CVA  * Seen by SLP and noted with dysphagia. Required some intermittent fluid boluses.  -- cont mechanical soft diet per SLP recs     Dyslipidemia  * FLP this stay showed tot cholest 163, HDL 47, LDL 91, .   * Started on atorvastatin 40 mg daily     Volume overload dt diastolic CHF exacerbation: Improved  Essential hypertension  * Not on/needing meds PTA.   * As above, echo this stay showed EF 50% with grade I diastolic dysfunction; RV not well visualized but appeared mild-moderately dilated with global systolic function probably mildly reduced.   * BPs were initially soft this stay and  required IVFs.   * BPs improved, ultimately developed pedal edema required IV Lasix   * Started on lisinopril this stay  -- cont lisinopril 10mg daily  -- cont Lasix BID (40mg po in AM, 20mg po in afternoon)     Prolonged QTc   * EKG on 7/13 showed QTc 494.   * Repeat EKG on 7/30 (while on Levaquin) showed QTc table at 475.   -- monitoring on telemetry this stay     Chronic bilateral LE edema with chronic venous stasis dermatitis and chronic skin changes  Hx of LLE cellulitis  * Was hospitalized in 11/2021 for sepsis dt to pneumonia and LLE cellulitis.   * During this stay, BLE noted to be patty and edematous, no unilateral leg swelling appreciated; LLE had patchy areas of erythema, no fluctuance, no painful areas with palpation; legs warm to touch. Lymphedema consulted.   -- cont LE elevation, lymphedema wraps     Lower back/R flank wound/abscess, suspect dt pressure type injury, s/p surgical debridement on 7/30/22  OA  Hx of bilateral hip replacements   Hx of chronic neck and back pain  * Pt with significant back pain early on in hospital stay. Was requiring IV hydromorphone.  Dose had to be decreased dt somnolence.  * MRI L-spine in 2018 showed multilevel degenerative changes with mild central stenosis. Patient has chronic back pain, reports having it over the last 2 years.   * During this stay, patient was noted with 5-6cm by 2-3cm wound with swelling/fluid/blistering in a fold of her lower back, did not appear infected; this seemed to be the source of her pain. Padded dressing placed and WOC RN ordered. Pain initially improved.   * Was placed on course of Augmentin on 7/24.   * On 7/26, was re-evaluated by WOC RN. Wound appeared more erythematous and purulence expressed. Worsening with shear stress from lift. Procal <0.05, WBC WNL. Abx changed to IV clindamycin.  * Wound cultures obtained. On 7/28, wound grew proteus and staph aureus (MRSA neg). US neg for abscess.   * Lost IV access on 7/28 so abx were changed  to oral clindamycin and oral levaquin. IV access re-established but was continued on oral abx.  * General surgery consulted, ultimately underwent excisional debridement of R flank abscess in OR on 7/30. Intraop cultures showed polymicrobial growth with proteus mirabilis x2 strains (both pan-sensitive), corynebacterium striatum and enterococcus faecalis.  * Wound vac placed per general surgery on 8/2  * ID consulted on 8/2 and abx narrowed to Augmentin alone, completed an additional 5 days of treatment on 8/7.   -- wound vac mgmt per WOC RN, MWF changes  -- prns available for pain  -- cont regular repositioning     Suspected meningioma left parietal region, incidentally seen on CT  * Head CT 7/13 showed a calcified extra-axial mass overlying the left parietal region measuring 1.4 cm, potentially representing a meningioma.  * Will need serial monitoring OP after discharge.      Anemia, suspect dilutional component  * Hgb normal on admit. Hgb 11.5 on 7/16. No overt clinical signs of major bleeding.  * Hgb now stable at 12-13  -- monitor labs periodically      Intertriginous dermatitis  * Chronic and stable, cont clotrimazole powder     Constipation  * Continue sched bowel regimen     Hypokalemia: Resolved  Hypophosphatemia: Resolved  * Potassium and phosphorus low at times this hospitalization. Treated with replacement.  * Started daily 20meq KCl replacement this stay.     Morbid obesity  * BMI 59 on admission. Recommend aggressive dietary and lifestyle modifications as condition improves     Suspected sleep apnea  O2 drop to mid 80s overnight 8/14. Briefly placed on 4LPM, which she then removed and then slept okay with sats in 90s thereafter.  -- recommend sleep study as outpatient     Moderate malnutrition in context of acute illness and chronic disease  * Nutritionist following. On modified diet as above.      COVID-19 vaccination status  * Not vaccinated. Family contemplating vaccine.     FEN: no IVFs, lytes  stable, mechanical soft diet  DVT Prophylaxis: Lovenox  Code Status: Full Code    Disposition: Medically stable for discharge. Discharge once LTC placement found. SW following.    Darby Martin, DO    Interval History    Seen this morning. More confused than previous mornings. Refused Abilify and Zyprexa last night. Required dose of IV Ativan. Says she feels cold. No pain at present. No reported cp/sob/cough, abd pain/n/v.      -Data reviewed today: I reviewed all new labs and imaging results over the last 24 hours. I personally reviewed no images or EKG's today.    Physical Exam   Temp: 97.5  F (36.4  C) Temp src: Oral BP: 117/50 Pulse: 73   Resp: 16 SpO2: 94 % O2 Device: None (Room air)    Vitals:    07/28/22 1508 08/05/22 0926 08/14/22 0600   Weight: (!) 159.2 kg (350 lb 14.4 oz) (!) 155.6 kg (343 lb) (!) 153.9 kg (339 lb 3.2 oz)     Vital Signs with Ranges  Temp:  [97.5  F (36.4  C)-97.7  F (36.5  C)] 97.5  F (36.4  C)  Pulse:  [73-91] 73  Resp:  [16] 16  BP: (112-123)/(50-57) 117/50  SpO2:  [94 %-96 %] 94 %  I/O last 3 completed shifts:  In: 600 [P.O.:600]  Out: -     Constitutional: Resting comfortably, more confused this AM than previous days, NAD  Respiratory: CTAB, no wheeze/rale/srhonchi, no increased work of breathing  Cardiovascular: HRRR, no MGR, +bilateral LE edema but improved from prior exams  GI: obese, S, NT, ND, +BS  Skin/Integumen: warm/dry, wound vac in place over R flank, chronic venous stasis changes in bilateral LEs  Other:      Medications     - MEDICATION INSTRUCTIONS -         acetaminophen  975 mg Oral Q8H     ARIPiprazole  5 mg Oral QPM     aspirin  325 mg Oral Daily     atorvastatin  40 mg Oral QPM     diclofenac  2 g Topical 4x Daily     enoxaparin ANTICOAGULANT  40 mg Subcutaneous Q12H     furosemide  20 mg Oral Daily at 4 pm     furosemide  40 mg Oral Daily     lactobacillus rhamnosus (GG)  1 capsule Oral BID     lisinopril  10 mg Oral Daily     miconazole   Topical BID      multivitamin, therapeutic  1 tablet Oral Daily     OLANZapine  2.5 mg Oral At Bedtime     polyethylene glycol  17 g Oral Daily     potassium chloride  20 mEq Oral Daily     pramoxine   Topical TID     senna-docusate  1-2 tablet Oral BID     sertraline  25 mg Oral Daily     [START ON 8/20/2022] sertraline  50 mg Oral Daily       Data   Recent Labs   Lab 08/17/22  0820 08/15/22  0743 08/14/22  0636   WBC  --  6.7  --    HGB  --  13.3  --    MCV  --  88  --     332 326   CR 0.63  --  0.72       No results found for this or any previous visit (from the past 24 hour(s)).

## 2022-08-19 NOTE — PROGRESS NOTES
[facility]  WO Nurse Inpatient Assessment     Consulted for: wound care/ vac dressing changes to Right lower back wound (Stage 3 HAPI)     Pt hx per chart: Cristy Bailey is a 77 year old female with a PMHx significant for morbid obesity, hypertension (not on/needing meds), and hyperlipidemia (not on/needing meds), who presented to [facility] 7/13/2022 with complete left-sided paralysis, left-sided facial droop, and difficulty speaking. CTA head remarkable for right MCA occlusion. She was given tenecteplase and subsequently transferred to [facility] 7/13/2022 for thrombectomy.     Areas Assessed:      Areas visualized during today's visit: back    Wound location: Right lower back in waist crease      Last Photo: 8/19/22 8-19-22      Wound due to: Hospital Acquired Pressure injury stage 3   Stage: Unstageable 7/28, Following surgical debridement 7/30 Stage 3 Pressure Injury and Moisture Associated Skin Damage (MASD), suspect intertriginous pressure, appears to be deeper tissue damage within a crease, possible shear component from lift   Wound history/plan of care: Pt frequently refusing repositioning and turning due to back pain.  Wound first noted 7/16 as a blister and maroon area, then deteriorated and needed I&D.  Has been improving greatly over past couple of weeks with vac therapy.        Surgical date: 7-30-22 Dr. Black     Date Negative Pressure Wound Therapy initiated: 8-2-22     Interventions in place: repositioning, specialty surface in use, moisture/incontinence management and offloading    Is patient s nutritional status compromised? yes   a. If yes, what interventions are in place? Protein supplements    Reason for initiating vac therapy? Presence of co-morbidities, High risk of infections and Need for accelerated granulation tissue    Which?of?the?following?co-morbidities?apply? Obesity  a. If diabetic is patient on a diabetic management program? N/A      Is osteomyelitis present in wound? no  a.  If yes what treatments are in place? N/A    Wound base: red moist granular tissue     Palpation of the wound bed: normal       Drainage: moderate      Description of drainage: serosanguinous      Measurements (length x width x depth, in cm) approx 2.5 x 14.5 x 0.5+cm, slightly positional     Tunneling N/A      Undermining approx 1cm near 8-10 o'clock   Periwound skin: small area of brown moist necrosis along wound edge at 8 o clock; general mild erythema and maceration      Color: pink       Temperature: normal to warm and sweaty  Odor: minimal  Pain: moderate, wound area still very tender; overall improved tolerance of dressing change 8/19  Pain intervention prior to dressing change: pt refused medication prior to dressing change; distraction techniques helpful  Treatment goal: Heal  and Increase granulation  STATUS: improving   Supplies ordered: at bedside       Buttocks/ coccyx: intact today 8/19, mild blanchable erythema      Treatment Plan:     Negative pressure wound therapy plan:  Wound location: right lower back   Change Days: Mon/Wed/Fri by WOC RN    Supplies (including all accessories) used: medium Black foam   Cleanse with Vashe prior to replacing VAC    Suction setting: -125   Methods used: Window paned all periwound skin with vac drape prior to applying sponge and Cut foam in half to reduce profile    Staff RN to assess integrity of dressing and ensure suction is set at appropriate level every shift.   Date canister. Chart canister output every shift. Change cannister weekly and PRN if full/occluded     Remove foam dressing and replace with BID normal saline or Vashe moist gauze dressing if:   -a dressing failure which cannot be repaired within 2 hours   -patient is discharging to home without a home pump   -patient is discharging to a facility outside the local area      The hospital VAC pump is not to be discharged with the patient.?Ensure to disconnect  patient from machine prior to discharge. Then,    - If a home KCI VAC pump has been delivered, connect home cannister to dressing tubing then connect cannister to home pump and turn on machine    - If transferring to a nearby facility with a KCI vac, can disconnect and clamp tubing then cover end with glove so can be reconnected within 2 hours  \    Orders: Reviewed    RECOMMEND PRIMARY TEAM ORDER: None, at this time  Education provided: importance of repositioning, plan of care and Off-loading pressure  Discussed plan of care with: Patient and Nurse  WOC nurse follow-up plan: Monday/WednesdayFriday  Notify WOC if wound(s) deteriorate.  Nursing to notify the Provider(s) and re-consult the WOC Nurse if new skin concern.    DATA:     Current support surface: Bariatric Low air loss mattress    Containment of urine/stool: Incontinence Protocol and Incontinent pad in bed  BMI: Body mass index is 54.75 kg/m .   Active diet order: Orders Placed This Encounter      Advance Diet as Tolerated      Regular Diet Adult Thin Liquids (level 0) (Upright position, alert, and assist as needed)     Output: I/O last 3 completed shifts:  In: 600 [P.O.:600]  Out: -      Labs:   Recent Labs   Lab 08/15/22  0743   HGB 13.3   WBC 6.7     Pressure injury risk assessment:   Sensory Perception: 3-->slightly limited  Moisture: 3-->occasionally moist  Activity: 2-->chairfast  Mobility: 2-->very limited  Nutrition: 2-->probably inadequate  Friction and Shear: 2-->potential problem  Demetrio Score: 14    Talia Linda RN CWN   Dept. Pager: [phone]  Dept. Office Number: [phone]

## 2022-08-19 NOTE — PLAN OF CARE
[id number].  Pt here with R MCA CVA.  Alert.  Oriented to  Self.  Anxious, confused.  Generalized weakness.  Vitals stable.  On RA.  LS clear.  Tolerating mech soft diet thin liquids.  Able to take small pills whole w/ applesauce.  Small BM this shift.   Pure wick in place.  No UO this shift.  .  Orders to straigth cath > 300.  Incontinent of urine on day shift.   Mild back pain.  Some relief with PRNs.  Pt up with lift and assist of 3-4.  R flank wound, cdi.  Wound VAC patent, @ 125.  Minimal drainage.  No piv access.  Turn and repo'd q 2 h.

## 2022-08-20 NOTE — PROVIDER NOTIFICATION
MD Notification    Notified Person: MD    Notified Person Name:Eli    Notification Date/Time:6:23 PM      Notification Interaction:amcom      Purpose of Notification:727 SZ  this pt had 275 ml UO over ~ 16 hours.  unable to empty bladder.  straight cath for 275ml @ 1815.  urine very dark, tea colored.  no MIVF ordered.  has HF.  Cr this AM WNL.  please advise  Ryan Ontiveros RN  [id number]    Orders Received: monitor UO.  If needs straight cath again tonight, place snyder    Comments:

## 2022-08-20 NOTE — PLAN OF CARE
[id number]. Pt here with R MCA CVA. Alert. Oriented to Self. Anxious, confused. Generalized weakness. Vitals stable. AM BP soft, BP meds held. On RA. LS clear. Tolerating mech soft diet thin liquids. Able to take small pills whole w/ applesauce. Small BM this shift. unable to void. Straight cath 275ml. If pt needs straight cath again, OK to place snyder. Pt up with lift and assist of 3-4. R flank wound, cdi. Wound VAC patent, @ 125. Minimal drainage. No piv access. Turn and repo'd q 2 h.

## 2022-08-20 NOTE — PROGRESS NOTES
Care Management Follow Up    Length of Stay (days): 38    Expected Discharge Date:       Concerns to be Addressed:       Patient plan of care discussed at interdisciplinary rounds: Yes    Anticipated Discharge Disposition: Home     Anticipated Discharge Services:    Anticipated Discharge DME:      Patient/family educated on Medicare website which has current facility and service quality ratings:    Education Provided on the Discharge Plan:    Patient/Family in Agreement with the Plan:      Referrals Placed by CM/SW:    Private pay costs discussed: Not applicable    Additional Information:  Writer placed follow up call to [facility]. Facility has clinically accepted patient and have a semi-private room available but patient is not vaccinated/refusing to be vaccinated at this time. Writer spoke Nadia on weekend admissions today who stated if patient received her first vaccine she could admit into the shared room and quarantine and receive her following vaccines at the facility. Nadia requested for writer though to follow up on 8/22/22 when Odette in admissions is back to discuss definitive protocol/admission.     Writer attempted to call patient's daughter Kaylynn who is very involved in her care - unable to reach her at this time.     Will continue to follow and request for hospitalist to have discussion with patient and family regarding preference for vaccination as it is a barrier to patient's discharge.    FADUMO Juarez, SW    [employer]

## 2022-08-20 NOTE — PROGRESS NOTES
[facility]    Hospitalist Progress Note    Assessment & Plan   Cristy Bailey is a 77 year old female with a PMHx significant for morbid obesity, hypertension, and hyperlipidemia, who presented to [facility] 7/13/2022 with complete left-sided paralysis, left-sided facial droop, and difficulty speaking. CTA head remarkable for right MCA occlusion. She was given tenecteplase and subsequently transferred to [facility] 7/13/2022 for thrombectomy.     Hospital stay complicated by flank wound which required surgical debridement and placement of wound vac and findings LTC placement.     Acute right MCA ischemic stroke with edema and right to left midline shift  S/p tenecteplase and subsequent R MCA mechanical thrombectomy 7/13/22  * Presented to [facility] 7/13 with complete paralysis, left-sided facial droop, and aphasia. Code stroke initiated. Head CT 7/13 showed signs of an evolving R MCA infarct. CTA head 7/13 showed a right carotid terminus occlusion, right middle cerebral artery M1  segment occlusion with poor opacification of the more distal right MCA branches/poor collateral flow. CTA neck 7/13 negative for acute occlusions. Pt given tenecteplase 7/13 at 13:11. Noted to be agitated and was subsequently intubated given need for procedure.   * Subsequently transferred to [facility] 7/13/2022 where she underwent above procedure.   7/14: Extubated. Head CT 7/14 showed evolution of acute infarct involving the R MCA distribution with increased swelling, cortical effacement, and new mild right-to-left midline shift; questionable hypoattenuation involving the bilateral occipital lobes which could be artifactual.   * Additional stroke workup pursued this stay -- echo 7/14 showed EF 50%, grade 1 diastolic dysfunction   * Started ASA and atorvastatin per stroke team.   * Repeat head CT 7/15 showed evolving R MCA stroke with areas of edema, overall stable.  -- conts on full dose ASA and  statin  -- goal SBP <140/90  -- 30d cardiac event monitor at discharge  -- cont PT/OT, plan placement given ongoing therapy and wound vac needs  -- mgmt of dysphagia as below  -- will need to follow up with [facility] in 6-8 wks (due beginning of September)     Anxiety   * Ongoing issues with anxiety this stay -- had been on regimen of Seroquel 12.5mg at dinner and 25mg HS with 12.5mg po q6h prn available. Mirtazapine 7.5mg on 8/15 evening given decreased appetite  * Psych consulted for assistance with ongoing mgmt -- seen on 8/16 and meds adjusted. Seroquel HS changed to Abilify 5mg HS. Recommended Zyptexa 5mg q6h prn for breakthrough insomnia/agitation while going off Seroquel. Consider uptitation of Abilify per psych recs. Advised uptitration of sertraline (25mg x3d then increase to 50mg daily) and Remeron stopped. Also advised to liberalize Ativan to 0.5mg q6h prn.   -- cont current regimen of meds including Abilify 5mg every evening, Zyprexa 2.5mg HS and sertraline (25mg daily 8/17-8/19 then increase to 50mg daily on 8/20)  -- cont prns per psych including Ativan 0.5mg q6h prn, Zyprexa 5mg q6h prn  -- consider psych reconsult in coming days to uptitrate Abilify      Urinary retention  * Retaining urine on 8/14. UA WNL, not worrisome for infection  -- prn straight cath, if requires x3, place snyder cath     Dysphagia due to CVA  * Seen by SLP and noted with dysphagia. Required some intermittent fluid boluses.  -- cont mechanical soft diet per SLP recs     Dyslipidemia  * FLP this stay showed tot cholest 163, HDL 47, LDL 91, .   * Started on atorvastatin 40 mg daily     Volume overload dt diastolic CHF exacerbation: Improved  Essential hypertension  * Not on/needing meds PTA.   * As above, echo this stay showed EF 50% with grade I diastolic dysfunction; RV not well visualized but appeared mild-moderately dilated with global systolic function probably mildly reduced.   * BPs were initially soft this stay and  required IVFs.   * BPs improved, ultimately developed pedal edema required IV Lasix   * Started on lisinopril this stay  -- cont lisinopril 10mg daily  -- cont Lasix BID (40mg po in AM, 20mg po in afternoon)     Prolonged QTc   * EKG on 7/13 showed QTc 494.   * Repeat EKG on 7/30 (while on Levaquin) showed QTc table at 475.   -- monitoring on telemetry this stay     Chronic bilateral LE edema with chronic venous stasis dermatitis and chronic skin changes  Hx of LLE cellulitis  * Was hospitalized in 11/2021 for sepsis dt to pneumonia and LLE cellulitis.   * During this stay, BLE noted to be patty and edematous, no unilateral leg swelling appreciated; LLE had patchy areas of erythema, no fluctuance, no painful areas with palpation; legs warm to touch. Lymphedema consulted.   -- cont LE elevation, lymphedema wraps     Lower back/R flank wound/abscess, suspect dt pressure type injury, s/p surgical debridement on 7/30/22  OA  Hx of bilateral hip replacements   Hx of chronic neck and back pain  * Pt with significant back pain early on in hospital stay. Was requiring IV hydromorphone.  Dose had to be decreased dt somnolence.  * MRI L-spine in 2018 showed multilevel degenerative changes with mild central stenosis. Patient has chronic back pain, reports having it over the last 2 years.   * During this stay, patient was noted with 5-6cm by 2-3cm wound with swelling/fluid/blistering in a fold of her lower back, did not appear infected; this seemed to be the source of her pain. Padded dressing placed and WOC RN ordered. Pain initially improved.   * Was placed on course of Augmentin on 7/24.   * On 7/26, was re-evaluated by WOC RN. Wound appeared more erythematous and purulence expressed. Worsening with shear stress from lift. Procal <0.05, WBC WNL. Abx changed to IV clindamycin.  * Wound cultures obtained. On 7/28, wound grew proteus and staph aureus (MRSA neg). US neg for abscess.   * Lost IV access on 7/28 so abx were changed  to oral clindamycin and oral levaquin. IV access re-established but was continued on oral abx.  * General surgery consulted, ultimately underwent excisional debridement of R flank abscess in OR on 7/30. Intraop cultures showed polymicrobial growth with proteus mirabilis x2 strains (both pan-sensitive), corynebacterium striatum and enterococcus faecalis.  * Wound vac placed per general surgery on 8/2  * ID consulted on 8/2 and abx narrowed to Augmentin alone, completed an additional 5 days of treatment on 8/7.   -- wound vac mgmt per WOC RN, MWF changes  -- prns available for pain  -- cont regular repositioning     Suspected meningioma left parietal region, incidentally seen on CT  * Head CT 7/13 showed a calcified extra-axial mass overlying the left parietal region measuring 1.4 cm, potentially representing a meningioma.  * Will need serial monitoring OP after discharge.      Anemia, suspect dilutional component  * Hgb normal on admit. Hgb 11.5 on 7/16. No overt clinical signs of major bleeding.  * Hgb now stable at 12-13  -- monitor labs periodically      Intertriginous dermatitis  * Chronic and stable, cont clotrimazole powder     Constipation  * Continue sched bowel regimen     Hypokalemia: Resolved  Hypophosphatemia: Resolved  * Potassium and phosphorus low at times this hospitalization. Treated with replacement.  * Started daily 20meq KCl replacement this stay.     Morbid obesity  * BMI 59 on admission. Recommend aggressive dietary and lifestyle modifications as condition improves     Suspected sleep apnea  O2 drop to mid 80s overnight 8/14. Briefly placed on 4LPM, which she then removed and then slept okay with sats in 90s thereafter.  -- recommend sleep study as outpatient     Moderate malnutrition in context of acute illness and chronic disease  * Nutritionist following. On modified diet as above.      COVID-19 vaccination status  * Not vaccinated. Family contemplating vaccine.     FEN: no IVFs, lytes  "stable, mechanical soft diet  DVT Prophylaxis: Lovenox  Code Status: Full Code    Disposition: Medically stable for discharge. Discharge once LTC placement found. SW following.    Darby Martin, DO    Interval History    Overnight events noted -- refused several evening meds including Abilify dt feelings of nausea. Seen this afternoon. Significant other Leo is at bedside. Shanthi seems to be in good spirits today with him visiting. Alert and oriented x2 (self/location) as per her baseline. No specific complaints at present. Reports she ate some lunch. Denies cp/sob/cough, abd pain/n/v. Tells me she'd like to \"get home as soon as possible\", needs reminding about barriers to discharge including wound vac.     -Data reviewed today: I reviewed all new labs and imaging results over the last 24 hours. I personally reviewed no images or EKG's today.    Physical Exam   Temp: 98.3  F (36.8  C) Temp src: Oral BP: 93/40 Pulse: 81   Resp: 16 SpO2: 92 % O2 Device: None (Room air)    Vitals:    07/28/22 1508 08/05/22 0926 08/14/22 0600   Weight: (!) 159.2 kg (350 lb 14.4 oz) (!) 155.6 kg (343 lb) (!) 153.9 kg (339 lb 3.2 oz)     Vital Signs with Ranges  Temp:  [97.4  F (36.3  C)-98.6  F (37  C)] 98.3  F (36.8  C)  Pulse:  [77-81] 81  Resp:  [16-20] 16  BP: ()/(40-59) 93/40  SpO2:  [92 %-97 %] 92 %  I/O last 3 completed shifts:  In: 100 [P.O.:100]  Out: 0     Constitutional: Resting comfortably, alert and oriented x2 (self/location), brighter affect today with significant other at bedside, some situational confusion, NAD   Respiratory: CTAB, no wheeze/rale/srhonchi, no increased work of breathing  Cardiovascular: HRRR, no MGR, +bilateral LE edema but improved from prior exams  GI: obese, S, NT, ND, +BS  Skin/Integumen: warm/dry, wound vac in place over R flank, chronic venous stasis changes in bilateral LEs  Other:      Medications     - MEDICATION INSTRUCTIONS -         acetaminophen  975 mg Oral Q8H     " ARIPiprazole  5 mg Oral QPM     aspirin  325 mg Oral Daily     atorvastatin  40 mg Oral QPM     diclofenac  2 g Topical 4x Daily     enoxaparin ANTICOAGULANT  40 mg Subcutaneous Q12H     furosemide  20 mg Oral Daily at 4 pm     furosemide  40 mg Oral Daily     lactobacillus rhamnosus (GG)  1 capsule Oral BID     lisinopril  10 mg Oral Daily     miconazole   Topical BID     multivitamin, therapeutic  1 tablet Oral Daily     OLANZapine  2.5 mg Oral At Bedtime     polyethylene glycol  17 g Oral Daily     potassium chloride  20 mEq Oral Daily     pramoxine   Topical TID     senna-docusate  1-2 tablet Oral BID     sertraline  50 mg Oral Daily       Data   Recent Labs   Lab 08/20/22  0911 08/17/22  0820 08/15/22  0743 08/14/22  0636   WBC  --   --  6.7  --    HGB  --   --  13.3  --    MCV  --   --  88  --     [phone]   CR 0.66 0.63  --  0.72       No results found for this or any previous visit (from the past 24 hour(s)).

## 2022-08-21 NOTE — PROGRESS NOTES
[facility]    Hospitalist Progress Note    Assessment & Plan   Cristy Bailey is a 77 year old female with a PMHx significant for morbid obesity, hypertension, and hyperlipidemia, who presented to [facility] 7/13/2022 with complete left-sided paralysis, left-sided facial droop, and difficulty speaking. CTA head remarkable for right MCA occlusion. She was given tenecteplase and subsequently transferred to [facility] 7/13/2022 for thrombectomy.     Hospital stay complicated by flank wound which required surgical debridement and placement of wound vac and findings LTC placement.     Acute right MCA ischemic stroke with edema and right to left midline shift  S/p tenecteplase and subsequent R MCA mechanical thrombectomy 7/13/22  * Presented to [facility] 7/13 with complete paralysis, left-sided facial droop, and aphasia. Code stroke initiated. Head CT 7/13 showed signs of an evolving R MCA infarct. CTA head 7/13 showed a right carotid terminus occlusion, right middle cerebral artery M1  segment occlusion with poor opacification of the more distal right MCA branches/poor collateral flow. CTA neck 7/13 negative for acute occlusions. Pt given tenecteplase 7/13 at 13:11. Noted to be agitated and was subsequently intubated given need for procedure.   * Subsequently transferred to [facility] 7/13/2022 where she underwent above procedure.   7/14: Extubated. Head CT 7/14 showed evolution of acute infarct involving the R MCA distribution with increased swelling, cortical effacement, and new mild right-to-left midline shift; questionable hypoattenuation involving the bilateral occipital lobes which could be artifactual.   * Additional stroke workup pursued this stay -- echo 7/14 showed EF 50%, grade 1 diastolic dysfunction   * Started ASA and atorvastatin per stroke team.   * Repeat head CT 7/15 showed evolving R MCA stroke with areas of edema, overall stable.  * As stay has progressed, patient  has consistently been oriented x2 (to self, location), unable to state the year. Also noted to have intermittent situational confusion.  -- conts on full dose ASA and statin  -- goal SBP <140/90  -- 30d cardiac event monitor at discharge  -- initial plan was for TCU placement but as stay has progressed and given wound vac placement, now needing LTC  -- will need to follow up with [facility] in 6-8 wks (due beginning of September)     Anxiety   * Ongoing issues with anxiety this stay -- had been on regimen of Seroquel 12.5mg at dinner and 25mg HS with 12.5mg po q6h prn available. Mirtazapine 7.5mg on 8/15 evening given decreased appetite  * Psych consulted for assistance with ongoing mgmt -- seen on 8/16 and meds adjusted. Seroquel HS changed to Abilify 5mg HS. Recommended Zyptexa 5mg q6h prn for breakthrough insomnia/agitation while going off Seroquel. Consider uptitation of Abilify per psych recs. Advised uptitration of sertraline (25mg x3d then increase to 50mg daily beginning 8/20) and Remeron stopped. Also advised to liberalize Ativan to 0.5mg q6h prn.   -- cont current regimen of meds including Abilify 5mg every evening, Zyprexa 2.5mg HS and sertraline 50mg daily  -- cont prns per psych including Ativan 0.5mg q6h prn, Zyprexa 5mg q6h prn -- none needed in recent days  -- psych consult ordered for 8/22 to review need to uptitrate Abilify      Urinary retention  * Retaining urine on 8/14. UA WNL, not worrisome for infection  * Requiring straight cath x1 on 8/20 PM  -- monitor PVRs, straight cath prn  -- if requiring frequent straight caths may need snyder     Dysphagia due to CVA: Improved  * Seen by SLP and noted with dysphagia. Required some intermittent fluid boluses.   * Was eventually able to advance to regular diet w/thin liquids     Dyslipidemia  * FLP this stay showed tot cholest 163, HDL 47, LDL 91, .   * Started on atorvastatin 40 mg daily     Volume overload dt diastolic CHF exacerbation:  Improved  Essential hypertension  * Not on/needing meds PTA.   * As above, echo this stay showed EF 50% with grade I diastolic dysfunction; RV not well visualized but appeared mild-moderately dilated with global systolic function probably mildly reduced.   * BPs were initially soft this stay and required IVFs. BPs improved.   * Developed pedal edema required IV Lasix. Ultimately transition to oral Lasix.  * Started on lisinopril this stay  -- cont lisinopril 10mg daily  -- cont Lasix BID (40mg po in AM, 20mg po in afternoon)     Prolonged QTc   * EKG on 7/13 showed QTc 494.   * Repeat EKG on 7/30 (while on Levaquin) showed QTc table at 475. Has since completed course of abx as below.  * Had been monitoring on telemetry this stay. No concerning findings so tele was ultimately dc'd 8/19     Chronic bilateral LE edema with chronic venous stasis dermatitis and chronic skin changes  Hx of LLE cellulitis  * Was hospitalized in 11/2021 for sepsis dt to pneumonia and LLE cellulitis.   * During this stay, BLE noted to be patty and edematous, no unilateral leg swelling appreciated; LLE had patchy areas of erythema, no fluctuance, no painful areas with palpation; legs warm to touch. Lymphedema consulted.   -- cont LE elevation, lymphedema wraps     Lower back/R flank wound/abscess, suspect dt pressure type injury, s/p surgical debridement on 7/30/22 and placement of wound vac on 8/2/22  OA  Hx of bilateral hip replacements   Hx of chronic neck and back pain  * Pt with significant back pain early on in hospital stay. Was requiring IV hydromorphone.  Dose had to be decreased dt somnolence.  * MRI L-spine in 2018 showed multilevel degenerative changes with mild central stenosis. Patient has chronic back pain, reports having it over the last 2 years.   * During this stay, patient was noted with 5-6cm by 2-3cm wound with swelling/fluid/blistering in a fold of her lower back, did not appear infected; this seemed to be the source of  her pain. Padded dressing placed and WOC RN ordered. Pain initially improved.   * Was placed on course of Augmentin on 7/24.   * On 7/26, was re-evaluated by WOC RN. Wound appeared more erythematous and purulence expressed. Worsening with shear stress from lift. Procal <0.05, WBC WNL. Abx changed to IV clindamycin.  * Wound cultures obtained. On 7/28, wound grew proteus and staph aureus (MRSA neg). US neg for abscess.   * Lost IV access on 7/28 so abx were changed to oral clindamycin and oral levaquin. IV access re-established but was continued on oral abx.  * General surgery consulted, ultimately underwent excisional debridement of R flank abscess in OR on 7/30. Intraop cultures showed polymicrobial growth with proteus mirabilis x2 strains (both pan-sensitive), corynebacterium striatum and enterococcus faecalis.  * Wound vac placed per general surgery on 8/2  * ID consulted on 8/2 and abx narrowed to Augmentin alone, completed an additional 5 days of treatment on 8/7.   -- wound vac mgmt per WOC RN, MWF changes  -- prns available for pain  -- cont regular repositioning     Suspected meningioma left parietal region, incidentally seen on CT  * Head CT 7/13 showed a calcified extra-axial mass overlying the left parietal region measuring 1.4 cm, potentially representing a meningioma.  * Will need serial monitoring OP after discharge.      Anemia, suspect dilutional component  * Hgb normal on admit. Hgb 11.5 on 7/16. No overt clinical signs of major bleeding.  * Hgb now stable at 12-13  -- monitor labs periodically      Intertriginous dermatitis  * Chronic and stable, cont clotrimazole powder     Constipation  * Continue sched bowel regimen     Hypokalemia: Resolved  Hypophosphatemia: Resolved  * Potassium and phosphorus low at times this hospitalization. Treated with replacement.  * Started daily 20meq KCl replacement this stay.     Morbid obesity  * BMI 59 on admission. Recommend aggressive dietary and lifestyle  modifications as condition improves     Suspected sleep apnea  O2 drop to mid 80s overnight 8/14. Briefly placed on 4LPM, which she then removed and then slept okay with sats in 90s thereafter.  -- recommend sleep study as outpatient     Moderate malnutrition in context of acute illness and chronic disease  * Nutritionist following. Appetite poor this stay, needing encouragement to take po.      COVID-19 vaccination status  * Not vaccinated. Family contemplating vaccine.     FEN: no IVFs, lytes stable, regular diet  DVT Prophylaxis: Lovenox 40mg subQ BID  Code Status: Full Code    Disposition: Medically stable for discharge. Discharge once LTC placement found. SW following. Per discussion with SW on 8/21, patient reportedly needs COVID vaccine prior to discharge to LTC facility (per facility policy). Hospitalist earlier this stay had previously discussed with patient's daughter Kaylynn and family was reportedly contemplating. Hospitalist called Kaylynn again on 8/21 to discuss again but no answer.     Darby Martin, DO    Interval History    Overnight events noted -- required straight cath x1 for urinary retention. Urine dark appearing. Hasn't required subsequent cath yet. Seen this morning. Significant other Leo at bedside. Generally feeling okay and no specific complaints at present. No reported cp/sob/cough, abd pain/n/v. Still needing encouragement to take po. As course of my visit progressed, patient became tearful and said she just wanted to go home. Both myself and Leo explained need for care facility dt wound vac. Discussed she will need to be vaccinated for COVID in order to go to care facility (per SW), patient also tearful at hearing this and said she wasn't sure what she should do. Leo encouraged her to get the vaccine here as providers have been recommending.    -Data reviewed today: I reviewed all new labs and imaging results over the last 24 hours. I personally reviewed no images or EKG's  today.    Physical Exam   Temp: 98.6  F (37  C) Temp src: Oral BP: 97/50 Pulse: 78   Resp: 16 SpO2: 91 % O2 Device: None (Room air)    Vitals:    07/28/22 1508 08/05/22 0926 08/14/22 0600   Weight: (!) 159.2 kg (350 lb 14.4 oz) (!) 155.6 kg (343 lb) (!) 153.9 kg (339 lb 3.2 oz)     Vital Signs with Ranges  Temp:  [98.3  F (36.8  C)-98.7  F (37.1  C)] 98.6  F (37  C)  Pulse:  [77-83] 78  Resp:  [16] 16  BP: ()/(50-58) 97/50  SpO2:  [91 %-95 %] 91 %  I/O last 3 completed shifts:  In: -   Out: 275 [Urine:275]    Constitutional: Resting comfortably, alert and oriented x2 (self/location) with some situational confusion, mildly anxious and crying at times during my visit progressed but NAD  Respiratory: CTAB, no wheeze/rales/rhonchi, no increased work of breathing  Cardiovascular: HRRR, no MGR, +bilateral LE edema but improved from prior exams  GI: obese, S, NT, ND, +BS  Skin/Integumen: warm/dry, wound vac in place over R flank, chronic venous stasis changes in bilateral LEs  Other:      Medications     - MEDICATION INSTRUCTIONS -         acetaminophen  975 mg Oral Q8H     ARIPiprazole  5 mg Oral QPM     aspirin  325 mg Oral Daily     atorvastatin  40 mg Oral QPM     diclofenac  2 g Topical 4x Daily     enoxaparin ANTICOAGULANT  40 mg Subcutaneous Q12H     furosemide  20 mg Oral Daily at 4 pm     furosemide  40 mg Oral Daily     lactobacillus rhamnosus (GG)  1 capsule Oral BID     lisinopril  10 mg Oral Daily     miconazole   Topical BID     multivitamin, therapeutic  1 tablet Oral Daily     OLANZapine  2.5 mg Oral At Bedtime     polyethylene glycol  17 g Oral Daily     potassium chloride  20 mEq Oral Daily     pramoxine   Topical TID     senna-docusate  1-2 tablet Oral BID     sertraline  50 mg Oral Daily       Data   Recent Labs   Lab 08/20/22  0911 08/17/22  0820 08/15/22  0743   WBC  --   --  6.7   HGB  --   --  13.3   MCV  --   --  88    282 332   CR 0.66 0.63  --        No results found for this or any  previous visit (from the past 24 hour(s)).

## 2022-08-22 NOTE — PLAN OF CARE
"Goal Outcome Evaluation:    Plan of Care Reviewed With: patient     Overall Patient Progress: no change    Outcome Evaluation: Diet: Regular.   Pt being seen daily for meal ordering.  \"I am eating a small amount of everything.\"  Encouraged po intake for healing  Modified supplement order --> breakfast: berry CLEAR, lunch: chocolate magic cup, dinner: orange GelateinPLUS      "

## 2022-08-22 NOTE — PROGRESS NOTES
"SPIRITUAL HEALTH SERVICES Progress Note  [facility]  75    Saw pt Cristy Bailey per follow-up from previous visit.    Illness Narrative - PT states that she has had a lengthy stay at hospital due to a stroke. She indicates that she is not certain about how much function she has lost and that is still being determined through testing.    Distress - PT states that she had  \"about the worst day of my life yesterday\" and that she cried most of the day but cannot recall what happened. She also states that she sees many things including animals, items of food and clothing, that others claim do not exist. PT insists these things are real and thinks that it \"isn't very nice\" for others to play tricks on her by denying the reality of the things she sees. PT also states that she was unable to see her father during his visit recently because it coincided with her hospitalization.  PT also states that she feels it is not necessary for her to go to a rehab facility but that she accepts that as a reality for awhile.    Coping - PT states that she has a supportive family in particular her partner of more than 40 years. She disclosed that she and her partner has several children and grandchildren and that the couple have taken in foster children in the past.    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    Plan - No follow-up planned as PT expects to discharge in the next 24 hours. SH remains available while PT is in hospital.    Chito Davis  Associate     "

## 2022-08-22 NOTE — PROGRESS NOTES
"CLINICAL NUTRITION SERVICES - REASSESSMENT NOTE      Recommendations Ordered by Registered Dietitian (RD):     Pt to be seen daily for meal ordering  Encouraged po intake for healing  Modified supplement order --> breakfast: berry CLEAR, lunch: chocolate magic cup, dinner: orange GelateinPLUS     Malnutrition: (8/16)  % Weight Loss:  > 5% in 1 month (severe malnutrition) - 9.5% loss in 1 month, suspect some may be fluid-related   % Intake:  </= 50% for >/= 1 month (severe malnutrition)  Subcutaneous Fat Loss:  Orbital region moderate depletion - visual  Muscle Loss:  Temporal region moderate depletion - visual  Fluid Retention: trace to Moderate generalized edema     Malnutrition Diagnosis: Severe malnutrition  In Context of:  Acute illness or injury  Chronic illness or disease       EVALUATION OF PROGRESS TOWARD GOALS   Diet:    Regular  Room Service with Assist  Supplements ---> breakfast: Ensure, lunch & dinner: GelateinPLUS, 2pm: Sawyer    Intake/Tolerance:    Chart reviewed  Per flowsheets, meal intake has been ~25%    Visited with pt this morning  \"I have been eating a little bit of everything\"  Tells me that she dislikes the Ensure - \"even tastes bad when I am eating food with it\"  She does like orange jello - dislikes the cherry Gelatein PLUS  Also states that she likes to have a soft cooked egg on toast and sausage for breakfast        NEW FINDINGS:     8/19: WOCN:   1) Buttocks/ coccyx: intact today 8/19, mild blanchable erythema   2) Wound location: Right lower back in waist crease     Wound due to: Hospital Acquired Pressure injury stage 3   Stage: Unstageable 7/28, Following surgical debridement 7/30 Stage 3 Pressure Injury and Moisture Associated Skin Damage (MASD), suspect intertriginous pressure, appears to be deeper tissue damage within a crease, possible shear component from lift     No new wt since 8/14  Wt down with diuresis - \"Volume overload dt diastolic CHF exacerbation\"  Likely some true wt " loss as well 2' to decreased po intake    08/14/22 0600 153.9 kg (339 lb 3.2 oz) Abnormal  Bed scale   08/05/22 0926 155.6 kg (343 lb) Abnormal  Bed scale   07/28/22 1508 159.2 kg (350 lb 14.4 oz) Abnormal  Bed scale   07/23/22 1102 174.6 kg (385 lb) Abnormal  --   07/16/22 0500 171.2 kg (377 lb 6.8 oz) Abnormal  Bed scale   07/15/22 0522 170.2 kg (375 lb 3.6 oz) Abnormal  --   07/14/22 0200 170 kg (374 lb 12.5 oz) Abnormal  Bed scale           Previous Goals (8/16):   Patient to consume >50% of nutritionally adequate meals BID with supplements TID  Evaluation: Not met    Previous Nutrition Diagnosis (8/16):   Inadequate oral intake related to related to prolonged admission, altered mental status, reduced appetite, increased needs secondary to wound healing as evidenced by <50% intakes x34 day admission  Evaluation: No change, modified below        CURRENT NUTRITION DIAGNOSIS  Increased nutrient needs (protein) related to higher demand for healing as evidenced by pt with noted stage 3 PI    INTERVENTIONS  Recommendations / Nutrition Prescription  Regular diet    Pt to be seen daily for meal ordering  Encouraged po intake for healing  Modified supplement order --> breakfast: berry CLEAR, lunch: chocolate magic cup, dinner: orange GelateinPLUS    Goals  Pt to consistently consume >50% meals and 100% of the supplements      MONITORING AND EVALUATION:  Progress towards goals will be monitored and evaluated per protocol and Practice Guidelines

## 2022-08-22 NOTE — PLAN OF CARE
Pt here with R MCA CVA.  Alert to self, place & situation.  Anxious, confused and talks to people in the room that aren't there.  Generalized weakness.  VSS on RA.  LS clear.  Tolerating small amount reg thin diet. Able to take small pills whole w/ applesauce.  Incontinent of urine; straight cath per orders & purewik in place.  R flank wound w/wound VAC patent, @ 125. Skin cares done on lower legs. No piv access.  Turn and repo'd q 2 h.  waiting for placement c/b limited availability of beds at [facility] for pts not vaccinated for covid. CTM.

## 2022-08-22 NOTE — PROGRESS NOTES
Pt here with R MCA CVA.  Alert.  Oriented to  Self.  Anxious, confused.  Agitated.   Generalized weakness.  Vitals stable.  On RA.  LS clear.  tolerating small amount reg thin diet. Able to take small pills whole w/ applesauce.  multiple large BMs.  Incontinent of urine.  Pt up with lift and assist of 2-3 to BSC and recliner.  R flank wound, cdi.  Wound VAC patent, @ 125. No drainage.  No piv access.  Turn and repo'd q 2 h.  waiting for placement c/b limited availability of beds at [facility] for pts not vaccinated for covid.

## 2022-08-22 NOTE — PROGRESS NOTES
"[facility] Nurse Inpatient Assessment     Consulted for: wound care/ vac dressing changes to Right lower back wound (Stage 3 HAPI)        Areas Assessed:      Areas visualized during today's visit: right skin fold     Wound location: Right lower back in waist crease      Last Photo: 8/22/22    Wound due to: Hospital Acquired Pressure injury stage 3   Stage: Unstageable 7/28, Following surgical debridement 7/30 Stage 3 Pressure Injury and Moisture Associated Skin Damage (MASD), suspect intertriginous pressure, appears to be deeper tissue damage within a crease, possible shear component from lift   Wound history/plan of care: wound vac in use at start of consult with less than 150cc drainage in canister dated 8/15  Wound base: granulation      Palpation of the wound bed: normal       Drainage: moderate (less than 150cc drainage in canister dated 8/15)     Description of drainage: serosanguinous      Measurements (length x width x depth, in cm) 2.5 x 14.5 x 0.5cm     Tunneling N/A      Undermining 1cm near 8-10 o'clock   Periwound skin: small area of brown moist necrosis along wound edge at 8 o clock      Color: pink       Temperature: normal to warm and sweaty  Odor: minimal, yeasty   Pain: mild  Pain intervention prior to dressing change: none needed   Treatment goal: Heal   STATUS: improving   Supplies ordered: at bedside             Treatment Plan:     Wound care  Start:  08/22/22 1830,   EVERY THIRD DAY,   Routine      Comments: Location: right posterior waist crease   Care: provided every 3 days by primary RN   1. Remove dressings and discard. Wet Aquacel AG to ease removal if dry still.   2. Cleanse wound and periwound skin with Vashe ( #[id number]) solution and 4x4\" gauze, pat dry   3. Apply 3M No Sting barrier wipe to periwound skin, let dry   4. Apply Dry Aquacel Ag (from unit closet) into wound bed, then cover with sacral mepilex dressing + a 4x4\" mepilex to cover whole area "         Orders: Updated    RECOMMEND PRIMARY TEAM ORDER: None, at this time  Education provided: importance of repositioning, plan of care and Off-loading pressure  Discussed plan of care with: Patient and Nurse  WOC nurse follow-up plan: weekly  Notify WOC if wound(s) deteriorate.  Nursing to notify the Provider(s) and re-consult the WOC Nurse if new skin concern.    DATA:     Current support surface: Bariatric Low air loss mattress    Containment of urine/stool: Incontinence Protocol and Incontinent pad in bed  BMI: Body mass index is 54.75 kg/m .   Active diet order: Orders Placed This Encounter      Advance Diet as Tolerated      Regular Diet Adult Thin Liquids (level 0) (Upright position, alert, and assist as needed)     Output: No intake/output data recorded.     Labs:   Recent Labs   Lab 08/22/22  1259   HGB 13.6   WBC 7.2     Pressure injury risk assessment:   Sensory Perception: 3-->slightly limited  Moisture: 3-->occasionally moist  Activity: 2-->chairfast  Mobility: 2-->very limited  Nutrition: 2-->probably inadequate  Friction and Shear: 1-->problem  Demetrio Score: 13    Eleanor LRN   Dept. Pager: [phone]  Dept. Office Number: [phone]

## 2022-08-22 NOTE — PROGRESS NOTES
Care Management Follow Up    Length of Stay (days): 40    Expected Discharge Date:       Concerns to be Addressed:       Patient plan of care discussed at interdisciplinary rounds: Yes    Anticipated Discharge Disposition:LTC   Anticipated Discharge Services:    Anticipated Discharge DME:      Patient/family educated on Medicare website which has current facility and service quality ratings:    Education Provided on the Discharge Plan: yes   Patient/Family in Agreement with the Plan:  yes    Referrals Placed by CM/SW:  Additional referrals sent to TCU  In [facility] and [facility].  SW contacted admissions at [facility] asking if pt continues to be on the wait list and she does.  Pt is not vaccinated and family refusing vacination. [facility] dos NOT have a private room at this time where pt can quarantine [facility] will continue to hold onto referral.in hopes of a private room becoming available where pt can quarantine..   Private pay costs discussed:     Additional Information:    JOSE GUADALUPE Barakat  [facility]  Care Transitions  [phone]

## 2022-08-22 NOTE — PROGRESS NOTES
[facility]    Hospitalist Progress Note    Date of Service (when I saw the patient): 08/22/2022  Admit date: 7/13/2022    Interval History   Full details of events over last 24 hours outlined below.   Patient denies any acute concerns.  Notably she does tell me that Lalit her boyfriend is in the bathroom when he is not.  She understands she is in the hospital for stroke but cannot tell me the date.  She does not understand why she cannot go home.  RN notes that she is retaining urine this morning but apparently was able to urinate on her own overnight.  Denies any SOB, CP, abdominal pain, N/V/D.    Assessment & Plan   Cristy Bailey is a 77 year old female with a PMHx significant for morbid obesity, hypertension, and hyperlipidemia, who presented to [facility] 7/13/2022 with complete left-sided paralysis, left-sided facial droop, and difficulty speaking. CTA head remarkable for right MCA occlusion. She was given tenecteplase and subsequently transferred to [facility] 7/13/2022 for thrombectomy.      Hospital stay complicated by flank wound which required surgical debridement and placement of wound vac and findings LTC placement.     Acute R MCA ischemic stroke with edema and right to left midline shift  S/p tenecteplase and subsequent R MCA mechanical thrombectomy 7/13/22  * Presented to [facility] 7/13 with complete paralysis, left-sided facial droop, and aphasia. Code stroke initiated. Head CT 7/13 showed signs of an evolving R MCA infarct. CTA head 7/13 showed a right carotid terminus occlusion, right middle cerebral artery M1  segment occlusion with poor opacification of the more distal right MCA branches/poor collateral flow. CTA neck 7/13 negative for acute occlusions. Pt given tenecteplase 7/13 at 13:11. Noted to be agitated and was subsequently intubated given need for procedure.   * Subsequently transferred to [facility] 7/13/2022 where she underwent above procedure.    7/14: Extubated. Head CT 7/14 showed evolution of acute infarct involving the R MCA distribution with increased swelling, cortical effacement, and new mild right-to-left midline shift; questionable hypoattenuation involving the bilateral occipital lobes which could be artifactual.   * Additional stroke workup pursued this stay -- echo 7/14 showed EF 50%, grade 1 diastolic dysfunction   * Started ASA and atorvastatin per stroke team.   * Repeat head CT 7/15 showed evolving R MCA stroke with areas of edema, overall stable.      -- conts on full dose ASA and statin  -- goal SBP <140/90  -- 30d cardiac event monitor at discharge  -- initial plan was for TCU placement but as stay has progressed and given wound vac placement, now needing LTC  -- will need to follow up with [facility] in 6-8 wks (due beginning of September)     Anxiety   Cognitive impairment, confusion as above.   * As stay has progressed, patient has consistently been oriented x2 (to self, location), unable to state the year. Also noted to have intermittent situational confusion. States family members have been visiting when they have not.   * Ongoing issues with anxiety this stay -- had been on regimen of Seroquel 12.5mg at dinner and 25mg HS with 12.5mg po q6h prn available. Mirtazapine 7.5mg on 8/15 evening given decreased appetite  * Psych consulted for assistance with ongoing mgmt -- seen on 8/16 and meds adjusted. Seroquel HS changed to Abilify 5mg HS. Recommended Zyptexa 5mg q6h prn for breakthrough insomnia/agitation while going off Seroquel. Consider uptitation of Abilify per psych recs. Advised uptitration of sertraline (25mg x3d then increase to 50mg daily beginning 8/20) and Remeron stopped. Also advised to liberalize Ativan to 0.5mg q6h prn.      -- cont current regimen of meds including Abilify 5mg every evening, Zyprexa 2.5mg HS and sertraline 50mg daily  -- cont prns per psych including Ativan 0.5mg q6h prn, Zyprexa 5mg q6h prn -- none needed  in recent days  -- psych consult ordered for 8/22 to review need to uptitrate Abilify      Urinary retention  * Retaining urine on 8/14. UA WNL, not worrisome for infection  * Requiring straight cath x1 on 8/20 PM    -- monitor PVRs, straight cath prn     Dysphagia due to CVA: Improved  * Seen by SLP and noted with dysphagia. Required some intermittent fluid boluses.   * Was eventually able to advance to regular diet w/thin liquids     Dyslipidemia  * FLP this stay showed tot cholest 163, HDL 47, LDL 91, .   * Started on atorvastatin 40 mg daily     Volume overload dt diastolic CHF exacerbation: Improved  Essential hypertension  * Not on/needing meds PTA.   * As above, echo this stay showed EF 50% with grade I diastolic dysfunction; RV not well visualized but appeared mild-moderately dilated with global systolic function probably mildly reduced.   * BPs were initially soft this stay and required IVFs. BPs improved.   * Developed pedal edema required IV Lasix. Ultimately transition to oral Lasix.  * Started on lisinopril this stay  -- cont lisinopril 10mg daily  -- cont Lasix BID (40mg po in AM, 20mg po in afternoon)  -- increased potassium to 20 meq BID on 08/22/22    Vitals:    07/16/22 0500 07/23/22 1102 07/28/22 1508 08/05/22 0926   Weight: (!) 171.2 kg (377 lb 6.8 oz) (!) 174.6 kg (385 lb) (!) 159.2 kg (350 lb 14.4 oz) (!) 155.6 kg (343 lb)    08/14/22 0600   Weight: (!) 153.9 kg (339 lb 3.2 oz)     No intake/output data recorded.  Recent Labs   Lab 08/22/22  1259 08/20/22  0911 08/17/22  0820   CO2 26  --   --      --   --    POTASSIUM 3.5  --   --    BUN 15  --   --    CR 0.54 0.66 0.63            Prolonged QTc   * EKG on 7/13 showed QTc 494.   * Repeat EKG on 7/30 (while on Levaquin) showed QTc table at 475. Has since completed course of abx as below.  * Had been monitoring on telemetry this stay. No concerning findings so tele was ultimately dc'd 8/19     Chronic bilateral LE edema with chronic  venous stasis dermatitis and chronic skin changes  Hx of LLE cellulitis  * Was hospitalized in 11/2021 for sepsis dt to pneumonia and LLE cellulitis.   * During this stay, BLE noted to be patty and edematous, no unilateral leg swelling appreciated; LLE had patchy areas of erythema, no fluctuance, no painful areas with palpation; legs warm to touch. Lymphedema consulted.   -- cont LE elevation, lymphedema wraps     Lower back/R flank wound/abscess, suspect dt pressure type injury, s/p surgical debridement on 7/30/22 and placement of wound vac on 8/2/22  OA  Hx of bilateral hip replacements   Hx of chronic neck and back pain  * Pt with significant back pain early on in hospital stay. Was requiring IV hydromorphone.  Dose had to be decreased dt somnolence.  * MRI L-spine in 2018 showed multilevel degenerative changes with mild central stenosis. Patient has chronic back pain, reports having it over the last 2 years.   * During this stay, patient was noted with 5-6cm by 2-3cm wound with swelling/fluid/blistering in a fold of her lower back, did not appear infected; this seemed to be the source of her pain. Padded dressing placed and WOC RN ordered. Pain initially improved.   * Was placed on course of Augmentin on 7/24.   * On 7/26, was re-evaluated by WOC RN. Wound appeared more erythematous and purulence expressed. Worsening with shear stress from lift. Procal <0.05, WBC WNL. Abx changed to IV clindamycin.  * Wound cultures obtained. On 7/28, wound grew proteus and staph aureus (MRSA neg). US neg for abscess.   * Lost IV access on 7/28 so abx were changed to oral clindamycin and oral levaquin. IV access re-established but was continued on oral abx.  * General surgery consulted, ultimately underwent excisional debridement of R flank abscess in OR on 7/30. Intraop cultures showed polymicrobial growth with proteus mirabilis x2 strains (both pan-sensitive), corynebacterium striatum and enterococcus faecalis.  * Wound vac  placed per general surgery on 8/2  * ID consulted on 8/2 and abx narrowed to Augmentin alone, completed an additional 5 days of treatment on 8/7.   -- wound vac mgmt per WOC RN, MWF changes  -- prns available for pain  -- cont regular repositioning     Suspected meningioma left parietal region, incidentally seen on CT  * Head CT 7/13 showed a calcified extra-axial mass overlying the left parietal region measuring 1.4 cm, potentially representing a meningioma.  * Will need serial monitoring OP after discharge.      Anemia, suspect dilutional component  * Hgb normal on admit. Hgb 11.5 on 7/16. No overt clinical signs of major bleeding.  * Hgb now stable at 12-13  -- monitor labs periodically      Intertriginous dermatitis  * Chronic and stable, cont clotrimazole powder     Constipation  * Continue sched bowel regimen     Hypokalemia: Resolved  Hypophosphatemia: Resolved  * Potassium and phosphorus low at times this hospitalization. Treated with replacement.  * Started daily 20meq KCl replacement this stay.     Morbid obesity  * BMI 59 on admission. Recommend aggressive dietary and lifestyle modifications as condition improves     Suspected sleep apnea  O2 drop to mid 80s overnight 8/14. Briefly placed on 4LPM, which she then removed and then slept okay with sats in 90s thereafter.  -- recommend sleep study as outpatient     Moderate malnutrition in context of acute illness and chronic disease  * Nutritionist following. Appetite poor this stay, needing encouragement to take po.      COVID-19 vaccination status  * Not vaccinated. Family contemplating vaccine.     FEN: no IVFs, lytes stable, regular diet  DVT Prophylaxis: Lovenox 40mg subQ BID  Code Status: Full Code     Disposition: Medically stable for discharge. Discharge once LTC placement found. SW following. Per discussion with SW on 8/21, patient reportedly needs COVID vaccine prior to discharge to LTC facility (per facility policy). Hospitalist earlier this stay  had previously discussed with patient's daughter Kaylynn and family was reportedly contemplating. Hospitalist called Kaylynn again on 8/21 to discuss again but no answer.     Clinically Significant Risk Factors Present on Admission                        Diet: Orders Placed This Encounter      Advance Diet as Tolerated      Regular Diet Adult Thin Liquids (level 0) (Upright position, alert, and assist as needed)     IVF: None   [place] Catheter: Not present     DVT Prophylaxis: Enoxaparin (Lovenox) SQ  Code Status: Full Code     Disposition: Expected discharge once placement found.   Communication: Discussed with RN, patient on 08/22/22    Vianca Teixeira MD  Hospitalist Service  [facility]  Securely message with the Vocera Web Console (learn more here)  Text page via [employer] Paging/Directory    -Data reviewed today: I reviewed all new labs and imaging results over the last 24 hours. I personally reviewed no images or EKG's today.    Physical Exam   Temp: 98.4  F (36.9  C) Temp src: Tympanic BP: 116/64 Pulse: 76   Resp: 16 SpO2: 96 %      Vitals:    07/28/22 1508 08/05/22 0926 08/14/22 0600   Weight: (!) 159.2 kg (350 lb 14.4 oz) (!) 155.6 kg (343 lb) (!) 153.9 kg (339 lb 3.2 oz)     Vital Signs with Ranges  Temp:  [98.4  F (36.9  C)] 98.4  F (36.9  C)  Pulse:  [76] 76  Resp:  [16] 16  BP: (116)/(64) 116/64  SpO2:  [96 %] 96 %  No intake/output data recorded.    Today's Exam  Constitutional:  NAD, disheveled.   Neuropsyche:  alert and oriented to person and hospital, answers questions appropriately. Believes family members have been here visiting when they have not. Able to lift all extremities off bed but drift noted on left in upper and lower extremity.    Respiratory:  Breathing comfortably, good air exchange, no wheezes, no crackles.   Cardiovascular:  Regular rate and rhythm, 1-2+ edema.  GI:  soft, NT/ND, BS normal  Skin/Integumen:  No acute rash or sign of bleeding.     Medications   All  medications reviewed on 08/22/22     - MEDICATION INSTRUCTIONS -         acetaminophen  975 mg Oral Q8H     ARIPiprazole  5 mg Oral QPM     aspirin  325 mg Oral Daily     atorvastatin  40 mg Oral QPM     diclofenac  2 g Topical 4x Daily     enoxaparin ANTICOAGULANT  40 mg Subcutaneous Q12H     furosemide  20 mg Oral Daily at 4 pm     furosemide  40 mg Oral Daily     lactobacillus rhamnosus (GG)  1 capsule Oral BID     lisinopril  10 mg Oral Daily     miconazole   Topical BID     multivitamin, therapeutic  1 tablet Oral Daily     OLANZapine  2.5 mg Oral At Bedtime     polyethylene glycol  17 g Oral Daily     potassium chloride  20 mEq Oral Daily     pramoxine   Topical TID     senna-docusate  1-2 tablet Oral BID     sertraline  50 mg Oral Daily     PRN Meds: sore throat lozenge, bisacodyl, calcium carbonate, hydrALAZINE, hydrOXYzine **OR** hydrOXYzine, labetalol, lidocaine 4%, lidocaine (buffered or not buffered), LORazepam, - MEDICATION INSTRUCTIONS -, naloxone **OR** naloxone **OR** naloxone **OR** naloxone, OLANZapine zydis, ondansetron **OR** ondansetron, oxyCODONE    Data   Recent Labs   Lab 08/22/22  1259 08/20/22  0911 08/17/22  0820   WBC 7.2  --   --    HGB 13.6  --   --    MCV 88  --   --     300 282     --   --    POTASSIUM 3.5  --   --    CHLORIDE 107  --   --    CO2 26  --   --    BUN 15  --   --    CR 0.54 0.66 0.63   ANIONGAP 8  --   --    ASHIA 8.9  --   --    *  --   --        No results found for this or any previous visit (from the past 24 hour(s)).

## 2022-08-23 NOTE — PROGRESS NOTES
[facility]    Hospitalist Progress Note    Date of Service (when I saw the patient): 08/23/2022  Admit date: 7/13/2022    Interval History   Full details of events over last 24 hours outlined below.   Patient notes she is cold today. She does not remember where she is. States she is at someone's house.   RN reports no issues. She is voiding. Most bladder scans have been under 300. Has not required regular straight catheterizations today.   Denies any SOB, CP, abdominal pain, N/V/D.    Assessment & Plan   Cristy Bailey is a 77 year old female with a PMHx significant for morbid obesity, hypertension, and hyperlipidemia, who presented to [facility] 7/13/2022 with complete left-sided paralysis, left-sided facial droop, and difficulty speaking. CTA head remarkable for right MCA occlusion. She was given tenecteplase and subsequently transferred to [facility] 7/13/2022 for thrombectomy.      Hospital stay complicated by flank wound which required surgical debridement and placement of wound vac and findings LTC placement.     Acute R MCA ischemic stroke with edema and right to left midline shift  S/p tenecteplase and subsequent R MCA mechanical thrombectomy 7/13/22  * Presented to [facility] 7/13 with complete paralysis, left-sided facial droop, and aphasia. Code stroke initiated. Head CT 7/13 showed signs of an evolving R MCA infarct. CTA head 7/13 showed a right carotid terminus occlusion, right middle cerebral artery M1  segment occlusion with poor opacification of the more distal right MCA branches/poor collateral flow. CTA neck 7/13 negative for acute occlusions. Pt given tenecteplase 7/13 at 13:11. Noted to be agitated and was subsequently intubated given need for procedure.   * Subsequently transferred to [facility] 7/13/2022 where she underwent above procedure.   7/14: Extubated. Head CT 7/14 showed evolution of acute infarct involving the R MCA distribution with increased  swelling, cortical effacement, and new mild right-to-left midline shift; questionable hypoattenuation involving the bilateral occipital lobes which could be artifactual.   * Additional stroke workup pursued this stay -- echo 7/14 showed EF 50%, grade 1 diastolic dysfunction   * Started ASA and atorvastatin per stroke team.   * Repeat head CT 7/15 showed evolving R MCA stroke with areas of edema, overall stable.    -- conts on full dose ASA and statin  -- goal SBP <140/90  -- 30d cardiac event monitor at discharge  -- initial plan was for TCU placement but as stay has progressed and given wound vac placement, now needing LTC  -- will need to follow up with [facility] in 6-8 wks (due beginning of September)     Anxiety   Cognitive impairment, confusion as above.   * As stay has progressed, patient has consistently been oriented x2 (to self, location), unable to state the year. Also noted to have intermittent situational confusion. States family members have been visiting when they have not.   * Ongoing issues with anxiety this stay -- had been on regimen of Seroquel 12.5mg at dinner and 25mg HS with 12.5mg po q6h prn available. Mirtazapine 7.5mg on 8/15 evening given decreased appetite  * Psych consulted for assistance with ongoing mgmt -- seen on 8/16 and meds adjusted. Seroquel HS changed to Abilify 5mg HS. Recommended Zyptexa 5mg q6h prn for breakthrough insomnia/agitation while going off Seroquel. Consider uptitation of Abilify per psych recs. Advised uptitration of sertraline (25mg x3d then increase to 50mg daily beginning 8/20) and Remeron stopped. Also advised to liberalize Ativan to 0.5mg q6h prn.      -- cont current regimen of meds including Abilify 5mg every evening, Zyprexa 2.5mg HS and sertraline 50mg daily  -- cont prns per psych including Ativan 0.5mg q6h prn, Zyprexa 5mg q6h prn -- none needed in recent days  -- psych consult ordered for 8/22 to review need to uptitrate Abilify      Urinary retention  *  Retaining urine on 8/14. UA WNL, not worrisome for infection  * Requiring straight cath x1 on 8/20 PM    -- monitor PVRs, straight cath prn     Dysphagia due to CVA: Improved  * Seen by SLP and noted with dysphagia. Required some intermittent fluid boluses.   * Was eventually able to advance to regular diet w/thin liquids     Dyslipidemia  * FLP this stay showed tot cholest 163, HDL 47, LDL 91, .   * Started on atorvastatin 40 mg daily     Volume overload dt diastolic CHF exacerbation: Improved  Essential hypertension  * Not on/needing meds PTA.   * As above, echo this stay showed EF 50% with grade I diastolic dysfunction; RV not well visualized but appeared mild-moderately dilated with global systolic function probably mildly reduced.   * BPs were initially soft this stay and required IVFs. BPs improved.   * Developed pedal edema required IV Lasix. Ultimately transition to oral Lasix.  * Started on lisinopril this stay  -- cont lisinopril 10mg daily  -- cont Lasix BID (40 mg po in AM, 20 mg po in afternoon)  -- Increased potassium to 20 meq BID on 08/22/22  -- Start daily weights  -- BMP in AM    Vitals:    07/16/22 0500 07/23/22 1102 07/28/22 1508 08/05/22 0926   Weight: (!) 171.2 kg (377 lb 6.8 oz) (!) 174.6 kg (385 lb) (!) 159.2 kg (350 lb 14.4 oz) (!) 155.6 kg (343 lb)    08/14/22 0600   Weight: (!) 153.9 kg (339 lb 3.2 oz)     No intake/output data recorded.  Recent Labs   Lab 08/22/22  1259 08/20/22  0911 08/17/22  0820   CO2 26  --   --      --   --    POTASSIUM 3.5  --   --    BUN 15  --   --    CR 0.54 0.66 0.63            Prolonged QTc   * EKG on 7/13 showed QTc 494.   * Repeat EKG on 7/30 (while on Levaquin) showed QTc table at 475. Has since completed course of abx as below.  * Had been monitoring on telemetry this stay. No concerning findings so tele was ultimately dc'd 8/19     Chronic bilateral LE edema with chronic venous stasis dermatitis and chronic skin changes  Hx of LLE  cellulitis  * Was hospitalized in 11/2021 for sepsis dt to pneumonia and LLE cellulitis.   * During this stay, BLE noted to be patty and edematous, no unilateral leg swelling appreciated; LLE had patchy areas of erythema, no fluctuance, no painful areas with palpation; legs warm to touch. Lymphedema consulted.   -- cont LE elevation, lymphedema wraps     Lower back/R flank wound/abscess, suspect dt pressure type injury, s/p surgical debridement on 7/30/22 and placement of wound vac on 8/2/22  OA  Hx of bilateral hip replacements   Hx of chronic neck and back pain  * Pt with significant back pain early on in hospital stay. Was requiring IV hydromorphone.  Dose had to be decreased dt somnolence.  * MRI L-spine in 2018 showed multilevel degenerative changes with mild central stenosis. Patient has chronic back pain, reports having it over the last 2 years.   * During this stay, patient was noted with 5-6cm by 2-3cm wound with swelling/fluid/blistering in a fold of her lower back, did not appear infected; this seemed to be the source of her pain. Padded dressing placed and WOC RN ordered. Pain initially improved.   * Was placed on course of Augmentin on 7/24.   * On 7/26, was re-evaluated by WOC RN. Wound appeared more erythematous and purulence expressed. Worsening with shear stress from lift. Procal <0.05, WBC WNL. Abx changed to IV clindamycin.  * Wound cultures obtained. On 7/28, wound grew proteus and staph aureus (MRSA neg). US neg for abscess.   * Lost IV access on 7/28 so abx were changed to oral clindamycin and oral levaquin. IV access re-established but was continued on oral abx.  * General surgery consulted, ultimately underwent excisional debridement of R flank abscess in OR on 7/30. Intraop cultures showed polymicrobial growth with proteus mirabilis x2 strains (both pan-sensitive), corynebacterium striatum and enterococcus faecalis.  * Wound vac placed per general surgery on 8/2  * ID consulted on 8/2 and  abx narrowed to Augmentin alone, completed an additional 5 days of treatment on 8/7.   -- wound vac mgmt per WOC RN, MWF changes  -- prns available for pain  -- cont regular repositioning     Suspected meningioma left parietal region, incidentally seen on CT  * Head CT 7/13 showed a calcified extra-axial mass overlying the left parietal region measuring 1.4 cm, potentially representing a meningioma.  * Will need serial monitoring OP after discharge.      Anemia, suspect dilutional component  * Hgb normal on admit. Hgb 11.5 on 7/16. No overt clinical signs of major bleeding.  * Hgb now stable at 12-13  -- monitor labs periodically      Intertriginous dermatitis  * Chronic and stable, cont clotrimazole powder     Constipation  * Continue sched bowel regimen     Hypokalemia: Resolved  Hypophosphatemia: Resolved  * Potassium and phosphorus low at times this hospitalization. Treated with replacement.  * Started daily 20meq KCl replacement this stay.     Morbid obesity  * BMI 59 on admission. Recommend aggressive dietary and lifestyle modifications as condition improves     Suspected sleep apnea  O2 drop to mid 80s overnight 8/14. Briefly placed on 4LPM, which she then removed and then slept okay with sats in 90s thereafter.  -- recommend sleep study as outpatient     Moderate malnutrition in context of acute illness and chronic disease  * Nutritionist following. Appetite poor this stay, needing encouragement to take po.      COVID-19 vaccination status  * Not vaccinated. Family contemplating vaccine.     FEN: no IVFs, lytes stable, regular diet  DVT Prophylaxis: Lovenox 40mg subQ BID  Code Status: Full Code     Disposition: Medically stable for discharge. Discharge once LTC placement found. TONY following. Per discussion with SW on 8/21, patient reportedly needs COVID vaccine prior to discharge to LTC facility (per facility policy). Hospitalist earlier this stay had previously discussed with patient's daughter Kaylynn and  family was reportedly contemplating. Hospitalist called Kaylynn again on 8/21 to discuss again but no answer. Discussed with SW on 08/23/22. They have escalated this issue to higher level, and will be reaching out to family regarding the need for vaccination in order to find placement.     Clinically Significant Risk Factors Present on Admission                        Diet: Orders Placed This Encounter      Advance Diet as Tolerated      Regular Diet Adult Thin Liquids (level 0) (Upright position, alert, and assist as needed)     IVF: None   [place] Catheter: Not present     DVT Prophylaxis: Enoxaparin (Lovenox) SQ  Code Status: Full Code     Disposition: Expected discharge once placement found.   Communication: Discussed with RN, patient on 08/22/22    Vianca Teixeira MD  Hospitalist Service  [facility]  Securely message with the Vocera Web Console (learn more here)  Text page via Zuvvu Paging/Directory    -Data reviewed today: I reviewed all new labs and imaging results over the last 24 hours. I personally reviewed no images or EKG's today.    Physical Exam   Temp: 97.8  F (36.6  C) Temp src: Axillary BP: (!) 147/76 Pulse: 64   Resp: 16 SpO2: 96 % O2 Device: None (Room air)    Vitals:    07/28/22 1508 08/05/22 0926 08/14/22 0600   Weight: (!) 159.2 kg (350 lb 14.4 oz) (!) 155.6 kg (343 lb) (!) 153.9 kg (339 lb 3.2 oz)     Vital Signs with Ranges  Temp:  [97.8  F (36.6  C)] 97.8  F (36.6  C)  Pulse:  [64-72] 64  Resp:  [16] 16  BP: (118-147)/(61-76) 147/76  SpO2:  [96 %] 96 %  No intake/output data recorded.    Today's Exam  Constitutional:  NAD, disheveled.   Neuropsyche:  alert and oriented to person only. Answers simple questions appropriately.   Respiratory:  Breathing comfortably, good air exchange, no wheezes, no crackles.   Cardiovascular:  Regular rate and rhythm, 1-2+ edema.  GI:  soft, NT/ND, BS normal  Skin/Integumen:  No acute rash or sign of bleeding. Venous stasis changes of the  lower extremities.     Medications   All medications reviewed on 08/23/22    - MEDICATION INSTRUCTIONS -         acetaminophen  975 mg Oral Q8H     ARIPiprazole  5 mg Oral QPM     aspirin  325 mg Oral Daily     atorvastatin  40 mg Oral QPM     diclofenac  2 g Topical 4x Daily     enoxaparin ANTICOAGULANT  40 mg Subcutaneous Q12H     furosemide  20 mg Oral Daily at 4 pm     furosemide  40 mg Oral Daily     lactobacillus rhamnosus (GG)  1 capsule Oral BID     lisinopril  10 mg Oral Daily     miconazole   Topical BID     multivitamin, therapeutic  1 tablet Oral Daily     OLANZapine  2.5 mg Oral At Bedtime     polyethylene glycol  17 g Oral Daily     potassium chloride  20 mEq Oral BID     pramoxine   Topical TID     senna-docusate  1-2 tablet Oral BID     sertraline  50 mg Oral Daily     PRN Meds: sore throat lozenge, bisacodyl, calcium carbonate, hydrALAZINE, hydrOXYzine **OR** hydrOXYzine, labetalol, lidocaine 4%, lidocaine (buffered or not buffered), LORazepam, - MEDICATION INSTRUCTIONS -, naloxone **OR** naloxone **OR** naloxone **OR** naloxone, OLANZapine zydis, ondansetron **OR** ondansetron, oxyCODONE    Data   Recent Labs   Lab 08/22/22  1259 08/20/22  0911 08/17/22  0820   WBC 7.2  --   --    HGB 13.6  --   --    MCV 88  --   --     300 282     --   --    POTASSIUM 3.5  --   --    CHLORIDE 107  --   --    CO2 26  --   --    BUN 15  --   --    CR 0.54 0.66 0.63   ANIONGAP 8  --   --    ASHIA 8.9  --   --    *  --   --        No results found for this or any previous visit (from the past 24 hour(s)).

## 2022-08-23 NOTE — PLAN OF CARE
VSS, on room air. Pt very anxious and hallucinating seeing all sorts of animals and people in her room. She is resistance to a lot of cares and will refuse to take medications. Pt up with lift to the bedside commode to void did not have a BM this evening. WOC RN was here this afternoon and removed wound vac to right lower back so pt now just has a dressing in place there that is C/D/I.       obese/soft/no organomegaly/nondistended/no pulsating masses/tender.../nontender...

## 2022-08-23 NOTE — PLAN OF CARE
Patient slept well overnight but continues to be anxious and hallucinating at times. Turned Q2 hr. Incontinent of small amt of urine. Bladder scan for 344mL, straight cath attempt x2 without success. Patient restless and refusing another try. States she will try when she is up later. Purewick in place. R flank wound dressing with mod drainage, reinforced.

## 2022-08-23 NOTE — PROGRESS NOTES
Care Management Follow Up    Length of Stay (days): 41    Expected Discharge Date:       Concerns to be Addressed:       Patient plan of care discussed at interdisciplinary rounds: Yes   Pt is needing LTC bed. The barriers to LTC bed is pt being unvaccinated., high acuity and     Anticipated Discharge Disposition:LTC   Anticipated Discharge Services:    Anticipated Discharge DME:    Education Provided on the Discharge Plan:  Yes  Patient/Family in Agreement with the Plan:  yes    Referrals Placed by CM/SW:  Multiple referrals out and pt declined by all  Private pay costs discussed:     Additional Information:  TONY contacted Desiree Sarabia regarding pt. She was informed of need for LTC bed, private room (due to vaccination status) wound care and wt.  Desiree with [facility] shared that the primary barrier for pt being able to be admitted to any of their facilities is that she is unvaccinated. They do not have private rooms on the LTC units. They could reevaluate two weeks following 2nd vaccine.  They are unable to take unvaccinated pt's into a LTC bed at ANY of their facilities.    TONY called and spoke with admissions at [facility] to see if there were any alternatives available for pt to admit to their facility but pt was declined due to hallucinations dn they felt that pt was too high acuity for their current population. SW did update that wound vac has been discontinued. They are willing to reconsider again as appropriate.    JOSE GUADALUPE Barakat  [facility]  Care Transitions  [phone]

## 2022-08-24 NOTE — PLAN OF CARE
Shift Note: 1900-0730  Pt alert, disoriented to time & place. VSS on RA. LS diminished. Incontinent. Purewick in place. Back wound dressing CDI. Edema wear CDI to BLE. Receiving scheduled tylenol, refused at times. Ax2 lift. Turn & repo q 2hrs. Regular diet, thin liquids. Takes pills whole. Discharge pending placement.

## 2022-08-24 NOTE — PROGRESS NOTES
Pt here with R MCA CVA.  Alert to self. Confused and hallucinates intermittently.  Generalized weakness. Up with lift. Up in chair for most of shift today.  T&R Q2hrs. VSS on RA.  LS clear. Took pills one at a time with water. Incontinent of urine; bladder scanned for 200ml at 1800, then urinated 400cc shortly after on commode. R flank wound changed today. Skin cares done on lower legs. No piv access.   Awaiting for placement to LTC. All wound care done today. New dressing on right flank, urinated on commode and up in chair most of the day.  Leg wound care done with edemawear.

## 2022-08-24 NOTE — CONSULTS
"      Psychiatry Consultation; Follow up              Reason for Consult, requesting source:    \"Please assess need to titrate abilify based on Dr. Valderrama's note 8/16\"    Requesting source: Darby Martin    Labs and imaging reviewed, patient seen and evaluated by ISRAEL Law CNP                 Interim history:    Cristy Bailey is a 77 year old female with a PMHx significant for morbid obesity, hypertension, and hyperlipidemia, who presented to [facility] 7/13/2022 with complete left-sided paralysis, left-sided facial droop, and difficulty speaking. CTA head remarkable for right MCA occlusion. She was given tenecteplase and subsequently transferred to [facility] 7/13/2022 for thrombectomy. Hospitalization has been complicated by anxiety and delirium.     Nursing notes that patient continues to be anxious and hallucinating seeing animals and people in her room. Discussed with nursing on unit and nurse today reports that patient has been calm and cooperative but notes short-term memory impairment such as forgetting she asked for things, confused when they move patient to bed although she has asked to be in bed.     Upon assessment, patient is observed sitting in bedside chair with pigtails. She reports her mood is good and denies anxiety/depression. Patient states she is sleeping well. Reports her hallucinations are better than they were a week ago but will still see some animals in the bathroom. Denies SI/HI/AVH.         Current Medications:       acetaminophen  975 mg Oral Q8H     ARIPiprazole  5 mg Oral QPM     aspirin  325 mg Oral Daily     atorvastatin  40 mg Oral QPM     diclofenac  2 g Topical 4x Daily     enoxaparin ANTICOAGULANT  40 mg Subcutaneous Q12H     furosemide  20 mg Oral Daily at 4 pm     furosemide  40 mg Oral Daily     lactobacillus rhamnosus (GG)  1 capsule Oral BID     lisinopril  10 mg Oral Daily     miconazole   Topical BID     multivitamin, therapeutic  1 tablet " "Oral Daily     OLANZapine  2.5 mg Oral At Bedtime     polyethylene glycol  17 g Oral Daily     potassium chloride  20 mEq Oral BID     potassium chloride  40 mEq Oral Once     pramoxine   Topical TID     senna-docusate  1-2 tablet Oral BID     sertraline  50 mg Oral Daily              MSE:   Appearance: awake, alert and adequately groomed  Attitude:  cooperative  Eye Contact:  good  Mood:  good  Affect:  appropriate and in normal range and mood congruent  Speech:  clear, coherent  Psychomotor Behavior:  no evidence of tardive dyskinesia, dystonia, or tics  Thought Process:  logical, linear and goal oriented  Associations:  no loose associations  Thought Content:  no evidence of suicidal ideation or homicidal ideation and no evidence of psychotic thought  Insight:  good  Judgement:  intact  Oriented to:  time, person, and place  Attention Span and Concentration:  intact  Recent and Remote Memory:  poor    Vital signs:  Temp: 97.8  F (36.6  C) Temp src: Oral BP: 135/71 Pulse: 84   Resp: 20 SpO2: 92 % O2 Device: None (Room air) Oxygen Delivery: 2 LPM Height: 167.6 cm (5' 6\") Weight: 143.8 kg (317 lb 1.6 oz)  Estimated body mass index is 51.18 kg/m  as calculated from the following:    Height as of this encounter: 1.676 m (5' 6\").    Weight as of this encounter: 143.8 kg (317 lb 1.6 oz).             DSM-5 Diagnosis:   1. Delirium likely superimposed on neurocognitive disorder related to recent stroke          Assessment:   Cristy Bailey is a 77 year old female with a PMHx significant for morbid obesity, hypertension, and hyperlipidemia, who presented to [facility] 7/13/2022 with complete left-sided paralysis, left-sided facial droop, and difficulty speaking. CTA head remarkable for right MCA occlusion. She was given tenecteplase and subsequently transferred to [facility] 7/13/2022 for thrombectomy.     Since being seen by psychiatry last on 8/16, her psychosis has improved however she still may benefit from " an increase in abilify for residual hallucinations. She is calm and cooperative and euthmyic on assessment today. Nursing notes major short-term memory impairments.           Summary of Recommendations:     Increased Abilify from 5mg to 10mg.

## 2022-08-24 NOTE — PLAN OF CARE
Goal Outcome Evaluation:    DATE & TIME: 08/24/22, [id number]  Summary: R CVA  Cognitive Concerns/ Orientation : A&O x 2, ds to place and situation- hallucinates    BEHAVIOR & AGGRESSION TOOL COLOR: GREEN  ABNL VS/O2: VSS on RA  MOBILITY: A x 2 lift   PAIN MANAGMENT: Denies   DIET: Regular  BOWEL/BLADDER: Cont. B/B  ABNL LAB/BG: K 3.2- replaced  DRAIN/DEVICES: No IV access  SKIN: Edema to BLE-edemawear on, Wound to R flank- CDI   D/C DAY/GOALS/PLACE: TBD- pending placement.

## 2022-08-24 NOTE — PROGRESS NOTES
"[facility]    Hospitalist Progress Note    Date of Service (when I saw the patient): 08/24/2022  Admit date: 7/13/2022    Interval History   Full details of events over last 24 hours outlined below.   \"My back is hurting from sitting this way and I would like to move, but other than that I am doing well.\".  Patient remembers she is in the hospital today.  She cannot remember why.   Denies any SOB, CP, abdominal pain, N/V/D.    Assessment & Plan   Cristy Bailey is a 77 year old female with a PMHx significant for morbid obesity, hypertension, and hyperlipidemia, who presented to [facility] 7/13/2022 with complete left-sided paralysis, left-sided facial droop, and difficulty speaking. CTA head remarkable for right MCA occlusion. She was given tenecteplase and subsequently transferred to [facility] 7/13/2022 for thrombectomy.      Hospital stay complicated by flank wound which required surgical debridement and placement of wound vac and findings LTC placement.     Acute R MCA ischemic stroke with edema and right to left midline shift  S/p tenecteplase and subsequent R MCA mechanical thrombectomy 7/13/22  * Presented to [facility] 7/13 with complete paralysis, left-sided facial droop, and aphasia. Code stroke initiated. Head CT 7/13 showed signs of an evolving R MCA infarct. CTA head 7/13 showed a right carotid terminus occlusion, right middle cerebral artery M1  segment occlusion with poor opacification of the more distal right MCA branches/poor collateral flow. CTA neck 7/13 negative for acute occlusions. Pt given tenecteplase 7/13 at 13:11. Noted to be agitated and was subsequently intubated given need for procedure.   * Subsequently transferred to [facility] 7/13/2022 where she underwent above procedure.   7/14: Extubated. Head CT 7/14 showed evolution of acute infarct involving the R MCA distribution with increased swelling, cortical effacement, and new mild right-to-left midline " shift; questionable hypoattenuation involving the bilateral occipital lobes which could be artifactual.   * Additional stroke workup pursued this stay -- echo 7/14 showed EF 50%, grade 1 diastolic dysfunction   * Started ASA and atorvastatin per stroke team.   * Repeat head CT 7/15 showed evolving R MCA stroke with areas of edema, overall stable.    -- conts on full dose ASA and statin  -- goal SBP <140/90  -- 30d cardiac event monitor at discharge  -- initial plan was for TCU placement but as stay has progressed and given wound vac placement, now needing LTC  -- will need to follow up with [facility] in 6-8 wks (due beginning of September)     Anxiety   Cognitive impairment, confusion as above.   * As stay has progressed, patient has consistently been oriented x2 (to self, location), unable to state the year. Also noted to have intermittent situational confusion. States family members have been visiting when they have not.   * Ongoing issues with anxiety this stay -- had been on regimen of Seroquel 12.5mg at dinner and 25mg HS with 12.5mg po q6h prn available. Mirtazapine 7.5mg on 8/15 evening given decreased appetite  * Psych consulted for assistance with ongoing mgmt -- seen on 8/16 and meds adjusted. Seroquel HS changed to Abilify 5mg HS. Recommended Zyptexa 5mg q6h prn for breakthrough insomnia/agitation while going off Seroquel. Consider uptitation of Abilify per psych recs. Advised uptitration of sertraline (25mg x3d then increase to 50mg daily beginning 8/20) and Remeron stopped. Also advised to liberalize Ativan to 0.5mg q6h prn.      -- cont current regimen of meds including Abilify 5mg every evening, Zyprexa 2.5mg HS and sertraline 50mg daily  -- cont prns per psych including Ativan 0.5mg q6h prn, Zyprexa 5mg q6h prn -- none needed in recent days  -- psych consult appreciated on 8/24 increased Abilify from 5 to 10 mg.      Urinary retention  * Retaining urine on 8/14. UA WNL, not worrisome for infection  *  Requiring straight cath x1 on 8/20 PM    -- monitor PVRs, straight cath prn     Dysphagia due to CVA: Improved  * Seen by SLP and noted with dysphagia. Required some intermittent fluid boluses.   * Was eventually able to advance to regular diet w/thin liquids     Dyslipidemia  * FLP this stay showed tot cholest 163, HDL 47, LDL 91, .   * Started on atorvastatin 40 mg daily     Volume overload dt diastolic CHF exacerbation: Improved  Essential hypertension  Hypokalemia on lasix   * Not on/needing meds PTA.   * As above, echo this stay showed EF 50% with grade I diastolic dysfunction; RV not well visualized but appeared mild-moderately dilated with global systolic function probably mildly reduced.   * BPs were initially soft this stay and required IVFs. BPs improved.   * Developed pedal edema required IV Lasix. Ultimately transition to oral Lasix.  * Started on lisinopril this stay  -- cont lisinopril 10mg daily  -- cont Lasix BID (40 mg po in AM, 20 mg po in afternoon)  -- Started on potassium this stay. Increased potassium to 20 mg TID on 08/22/22. In addition to replacement protocol  -- Daily weights appreciated. Wt down on 08/24/22  -- K ordered for 8/26    Vitals:    07/23/22 1102 07/28/22 1508 08/05/22 0926 08/14/22 0600   Weight: (!) 174.6 kg (385 lb) (!) 159.2 kg (350 lb 14.4 oz) (!) 155.6 kg (343 lb) (!) 153.9 kg (339 lb 3.2 oz)    08/24/22 0645   Weight: 143.8 kg (317 lb 1.6 oz)     I/O last 3 completed shifts:  In: 360 [P.O.:360]  Out: 600 [Urine:600]  Recent Labs   Lab 08/24/22  0909 08/22/22  1259 08/20/22  0911   CR 0.56 0.54 0.66       Recent Labs   Lab 08/24/22  1506 08/24/22  0909 08/23/22  1637 08/22/22  1259   POTASSIUM 3.3* 3.2* 3.5 3.5   MAG  --  1.9 1.8  --        Hypophosphatemia: Resolved     Prolonged QTc   * EKG on 7/13 showed QTc 494.   * Repeat EKG on 7/30 (while on Levaquin) showed QTc table at 475. Has since completed course of abx as below.  * Had been monitoring on telemetry this  stay. No concerning findings so tele was ultimately dc'd 8/19     Chronic bilateral LE edema with chronic venous stasis dermatitis and chronic skin changes  Hx of LLE cellulitis  * Was hospitalized in 11/2021 for sepsis dt to pneumonia and LLE cellulitis.   * During this stay, BLE noted to be patty and edematous, no unilateral leg swelling appreciated; LLE had patchy areas of erythema, no fluctuance, no painful areas with palpation; legs warm to touch. Lymphedema consulted.   -- cont LE elevation, lymphedema wraps     Lower back/R flank wound/abscess, suspect dt pressure type injury, s/p surgical debridement on 7/30/22 and placement of wound vac on 8/2/22  OA  Hx of bilateral hip replacements   Hx of chronic neck and back pain  * Pt with significant back pain early on in hospital stay. Was requiring IV hydromorphone.  Dose had to be decreased dt somnolence.  * MRI L-spine in 2018 showed multilevel degenerative changes with mild central stenosis. Patient has chronic back pain, reports having it over the last 2 years.   * During this stay, patient was noted with 5-6cm by 2-3cm wound with swelling/fluid/blistering in a fold of her lower back, did not appear infected; this seemed to be the source of her pain. Padded dressing placed and WOC RN ordered. Pain initially improved.   * Was placed on course of Augmentin on 7/24.   * On 7/26, was re-evaluated by WOC RN. Wound appeared more erythematous and purulence expressed. Worsening with shear stress from lift. Procal <0.05, WBC WNL. Abx changed to IV clindamycin.  * Wound cultures obtained. On 7/28, wound grew proteus and staph aureus (MRSA neg). US neg for abscess.   * Lost IV access on 7/28 so abx were changed to oral clindamycin and oral levaquin. IV access re-established but was continued on oral abx.  * General surgery consulted, ultimately underwent excisional debridement of R flank abscess in OR on 7/30. Intraop cultures showed polymicrobial growth with proteus  mirabilis x2 strains (both pan-sensitive), corynebacterium striatum and enterococcus faecalis.  * Wound vac placed per general surgery on 8/2  * ID consulted on 8/2 and abx narrowed to Augmentin alone, completed an additional 5 days of treatment on 8/7.   -- wound vac mgmt per WOC RN, MWF changes  -- prns available for pain  -- cont regular repositioning     Suspected meningioma left parietal region, incidentally seen on CT  * Head CT 7/13 showed a calcified extra-axial mass overlying the left parietal region measuring 1.4 cm, potentially representing a meningioma.  * Will need serial monitoring OP after discharge.      Anemia, suspect dilutional component  * Hgb normal on admit. Hgb 11.5 on 7/16. No overt clinical signs of major bleeding.  * Hgb now stable at 12-13  -- monitor labs periodically      Recent Labs   Lab 08/22/22  1259   HGB 13.6       Intertriginous dermatitis  * Chronic and stable, cont clotrimazole powder     Constipation  * Continue sched bowel regimen     Morbid obesity  * BMI 59 on admission. Recommend aggressive dietary and lifestyle modifications as condition improves     Suspected sleep apnea  O2 drop to mid 80s overnight 8/14. Briefly placed on 4LPM, which she then removed and then slept okay with sats in 90s thereafter.  -- recommend sleep study as outpatient     Moderate malnutrition in context of acute illness and chronic disease  * Nutritionist following. Appetite poor this stay, needing encouragement to take po.      COVID-19 vaccination status  * Not vaccinated. Family contemplating vaccine.     FEN: no IVFs, lytes stable, regular diet  DVT Prophylaxis: Lovenox 40mg subQ BID  Code Status: Full Code     Disposition: Medically stable for discharge. Discharge once LTC placement found. SW following. Per discussion with SW on 8/21, patient reportedly needs COVID vaccine prior to discharge to LTC facility (per facility policy). Hospitalist earlier this stay had previously discussed with  patient's daughter Kaylynn and family was reportedly contemplating. Hospitalist called Kaylynn again on 8/21 to discuss again but no answer. Discussed with SW on 08/23/22. They have escalated this issue to higher level, and will be reaching out to family regarding the need for vaccination in order to find placement.     Clinically Significant Risk Factors Present on Admission                        Diet: Orders Placed This Encounter      Advance Diet as Tolerated      Regular Diet Adult Thin Liquids (level 0) (Upright position, alert, and assist as needed)     IVF: None   [place] Catheter: Not present     DVT Prophylaxis: Enoxaparin (Lovenox) SQ  Code Status: Full Code     Disposition: Expected discharge once placement found.   Communication: Discussed with RN, patient on 08/22/22    Vianca Teixeira MD  Hospitalist Service  [facility]  Securely message with the Vocera Web Console (learn more here)  Text page via [employer] Paging/Directory    -Data reviewed today: I reviewed all new labs and imaging results over the last 24 hours. I personally reviewed no images or EKG's today.    Physical Exam   Temp: 97.8  F (36.6  C) Temp src: Oral BP: 135/71 Pulse: 84   Resp: 20 SpO2: 92 % O2 Device: None (Room air) Oxygen Delivery: 2 LPM  Vitals:    08/05/22 0926 08/14/22 0600 08/24/22 0645   Weight: (!) 155.6 kg (343 lb) (!) 153.9 kg (339 lb 3.2 oz) 143.8 kg (317 lb 1.6 oz)     Vital Signs with Ranges  Temp:  [97.6  F (36.4  C)-98  F (36.7  C)] 97.8  F (36.6  C)  Pulse:  [74-84] 84  Resp:  [16-20] 20  BP: (135-150)/(58-86) 135/71  SpO2:  [92 %-94 %] 92 %  I/O last 3 completed shifts:  In: 360 [P.O.:360]  Out: 600 [Urine:600]    Today's Exam  Constitutional:  NAD, disheveled.   Neuropsyche:  alert and oriented to person only. Answers simple questions appropriately.   Respiratory:  Breathing comfortably, good air exchange, no wheezes, no crackles.   Cardiovascular:  Regular rate and rhythm, 1-2+ edema.  GI:  soft,  NT/ND, BS normal  Skin/Integumen:  No acute rash or sign of bleeding. Venous stasis changes of the lower extremities.     Medications   All medications reviewed on 08/23/22    - MEDICATION INSTRUCTIONS -         acetaminophen  975 mg Oral Q8H     ARIPiprazole  10 mg Oral QPM     aspirin  325 mg Oral Daily     atorvastatin  40 mg Oral QPM     diclofenac  2 g Topical 4x Daily     enoxaparin ANTICOAGULANT  40 mg Subcutaneous Q12H     furosemide  20 mg Oral Daily at 4 pm     furosemide  40 mg Oral Daily     lactobacillus rhamnosus (GG)  1 capsule Oral BID     lisinopril  10 mg Oral Daily     miconazole   Topical BID     multivitamin, therapeutic  1 tablet Oral Daily     OLANZapine  2.5 mg Oral At Bedtime     polyethylene glycol  17 g Oral Daily     potassium chloride  20 mEq Oral BID     pramoxine   Topical TID     senna-docusate  1-2 tablet Oral BID     sertraline  50 mg Oral Daily     PRN Meds: sore throat lozenge, bisacodyl, calcium carbonate, hydrALAZINE, hydrOXYzine **OR** hydrOXYzine, labetalol, lidocaine 4%, lidocaine (buffered or not buffered), LORazepam, - MEDICATION INSTRUCTIONS -, naloxone **OR** naloxone **OR** naloxone **OR** naloxone, OLANZapine zydis, ondansetron **OR** ondansetron, oxyCODONE    Data   Recent Labs   Lab 08/24/22  1506 08/24/22  0909 08/23/22  1637 08/22/22  1259 08/22/22  1259 08/20/22  0911   WBC  --   --   --   --  7.2  --    HGB  --   --   --   --  13.6  --    MCV  --   --   --   --  88  --    PLT  --   --   --   --  285 300   NA  --  141  --   --  141  --    POTASSIUM 3.3* 3.2* 3.5   < > 3.5  --    CHLORIDE  --  104  --   --  107  --    CO2  --  31  --   --  26  --    BUN  --  12  --   --  15  --    CR  --  0.56  --   --  0.54 0.66   ANIONGAP  --  6  --   --  8  --    ASHIA  --  8.8  --   --  8.9  --    GLC  --  95  --   --  106*  --     < > = values in this interval not displayed.       No results found for this or any previous visit (from the past 24 hour(s)).

## 2022-08-25 NOTE — PLAN OF CARE
Goal Outcome Evaluation:    Patient A&0x1. No VS or Neuros overnight. No acute neuro changes noted. She is anxious/fearful of staff believing staff will harm her, reassurance provided. Continues to call/yell out.  Turn and Repo with three and use of [id number]  +2 edema noted in BLE. Feet are patty and peeling.  Lungs diminished. On RA.  Incontinent of BB, purewick in place.  . Potassium WNL scheduled redraw this am. PRN R-flank wound dressing change complete. Copious purulent drainage noted. Awaiting placement.

## 2022-08-25 NOTE — PLAN OF CARE
Goal Outcome Evaluation:                    Patient A&0x1. She was anxious for part of the shift. Yelling out that someone stole her phone but she was able to settle later in the shift. She is up with a lift. She spent time sitting in the chair this shift.  +2 edema noted in BLE. Feet are patty and peeling.  Lungs diminished. On RA.  IV SL. Neuro's are unchanged. No deficits noted. Tolerating regular diet. She did not urinate this shift. She bladder scanned for 130. I encouraged Po fluids. Hospital notified of this and no new orders received. Potassium replaced 2x and is now at 3.6. Awaiting placement.

## 2022-08-25 NOTE — PLAN OF CARE
A&O to self, very forgetful, intermittently hallucinating, and anxious. PRN ativan x1. VSS on RA. Denies pain, on scheduled tylenol. Regular diet, poor appetite. A2-3 lift, not OOB this shift, turn and repo.  Incont B/B, purwic in place; x1 sm BM this shift. BLE edema, edema wraps on, legs elevated. Scattered bruises. Interdry on L pannus. Rash under breasts, powder applied. R flank dressing, CDI. On K and Mg protocol- WDL, redraw tomorrow AM. No PIV. Awaiting placement.

## 2022-08-25 NOTE — PROGRESS NOTES
Notified Hospitalist that patient has not urinated this shift and bladder scanned for only 130ml. No new orders received due to recent fluid overload status

## 2022-08-25 NOTE — PROGRESS NOTES
"CLINICAL NUTRITION SERVICES - REASSESSMENT NOTE      RECOMMENDATIONS FOR MD/PROVIDER TO ORDER:   - consider nutrition support if within GOC, pt continues to have poor PO intake and we have tried every supplement available --> pt will not take any   Recommendations Ordered by Registered Dietitian (RD):   - discontinued ONS and sawyer   Malnutrition:   % Weight Loss:  > 5% in 1 month (severe malnutrition) - significant loss over this admit  % Intake:  </= 50% for >/= 5 days (severe malnutrition) - consistent this admit x43 days  Subcutaneous Fat Loss:  Orbital region moderate depletion - visual, per 8/16 note  Muscle Loss:  Temporal region moderate depletion - visual, per 8/16 note  Fluid Retention:  Trace to moderate generalized edema    Malnutrition Diagnosis: Severe malnutrition  In Context of:  Acute illness or injury  Chronic illness or disease       EVALUATION OF PROGRESS TOWARD GOALS   Diet: Regular  - Room Service with Assist  - breakfast: berry CLEAR, lunch: chocolate magic cup, dinner: orange GelateinPLUS    Intake/Tolerance:   - attempted to visit with pt today, but she was quite confused. She was calling over to \"dulce maria\" to bring her a pair of yellow socks throughout visit (Dulce Maria was not in room). When asked if she liked the supplements she reported she does not like them and she has some in her other hospital room in [place] since we had been sending them to her there too. She has not been drinking the Sawyer --> short supply, will discontinue. Spoke with nursing assistant in room and discussed how pt does not like any supplement --> will discontinue as we have tried about every supplement and she does not like/will not take any.   - per nursing flow sheet, 25% intakes documented --> consistent <50% intakes for majority of admit x43 days   - per health touch, pt receiving 2-3 meals/day + various supplements    Barriers: poor appetite, mentation    ASSESSED NUTRITION NEEDS:  Dosing Weight: 89.7 kg " (adjusted)  Estimated Energy Needs: 1367-0620 kcals (15-20 Kcal/Kg)  Justification: maintenance r/t BMI  Estimated Protein Needs: 108-135 grams protein (1.2-1.5 g pro/Kg)  Justification: wound healing, obesity guidelines, preservation of lean body mass and increased needs r/t age  Estimated Fluid Needs: (1 mL/Kcal)  Justification: maintenance and per provider pending fluid status      NEW FINDINGS:   General:   - pt is Medically stable for discharge.   - Awaiting LTC placement, needing COVID vax but family declining  - pt continues to hallucinate    Weight: significant wt loss over admit, -26.2 kg    Medications: lasix, culturell, thera-vit, miralax, potassium-chloride packet (replacment protocol), senna-docusate    GI: last BM x8 on 8/21    Skin: WOC following, 8/22  Wound location: Right lower back in waist crease    Wound due to: Hospital Acquired Pressure injury stage 3   Stage: Unstageable 7/28, Following surgical debridement 7/30 Stage 3 Pressure Injury and Moisture Associated Skin Damage (MASD), suspect intertriginous pressure, appears to be deeper tissue damage within a crease, possible shear component from lift   Wound history/plan of care: wound vac in use at start of consult with less than 150cc drainage in canister dated 8/15      Previous Goals:   Pt to consistently consume >50% meals and 100% of the supplements  Evaluation: Not met    Previous Nutrition Diagnosis:   Increased nutrient needs (protein) related to higher demand for healing as evidenced by pt with noted stage 3 PI  Evaluation: No change      MALNUTRITION  % Weight Loss:  > 5% in 1 month (severe malnutrition) - significant loss over this admit  % Intake:  </= 50% for >/= 5 days (severe malnutrition) - consistent this admit x43 days  Subcutaneous Fat Loss:  Orbital region moderate depletion - visual, per 8/16 note  Muscle Loss:  Temporal region moderate depletion - visual, per 8/16 note  Fluid Retention:  Trace to moderate generalized  edema    Malnutrition Diagnosis: Severe malnutrition  In Context of:  Acute illness or injury  Chronic illness or disease    CURRENT NUTRITION DIAGNOSIS  Increased nutrient needs (protein) related to higher demand for healing as evidenced by pt with noted stage 3 PI    INTERVENTIONS  Recommendations / Nutrition Prescription  - discontinued ONS and kate    Implementation  Medical Food Supplement    Goals  Pt to consistently consume >50% meals and 100% of the supplements      MONITORING AND EVALUATION:  Progress towards goals will be monitored and evaluated per protocol and Practice Guidelines      Donna Ho RD, LD

## 2022-08-25 NOTE — PLAN OF CARE
Alert to self only. VSS on RA, soft BP. Fearful/hallucinations managed w/ PRN ativan & zyprexa & continued reassurance. T&R 2-3, lift. Edema to BLE. Feet peeling/dry. Incont B/B, purewick in place. K recheck AM. R flank wound dressing changed. Awaiting placement to LTC.

## 2022-08-25 NOTE — PROGRESS NOTES
[facility]    Hospitalist Progress Note    Date of Service (when I saw the patient): 08/25/2022  Admit date: 7/13/2022    Interval History   Full details of events over last 24 hours outlined below.   Cristy offers no concerns. She knows she is in the hospital today.   Denies any SOB, CP, abdominal pain, N/V/D.    Assessment & Plan   Cristy Bailey is a 77 year old female with a PMHx significant for morbid obesity, hypertension, and hyperlipidemia, who presented to [facility] 7/13/2022 with complete left-sided paralysis, left-sided facial droop, and difficulty speaking. CTA head remarkable for right MCA occlusion. She was given tenecteplase and subsequently transferred to [facility] 7/13/2022 for thrombectomy.      Hospital stay complicated by flank wound which required surgical debridement and placement of wound vac and findings LTC placement.     Acute R MCA ischemic stroke with edema and right to left midline shift  S/p tenecteplase and subsequent R MCA mechanical thrombectomy 7/13/22  * Presented to [facility] 7/13 with complete paralysis, left-sided facial droop, and aphasia. Code stroke initiated. Head CT 7/13 showed signs of an evolving R MCA infarct. CTA head 7/13 showed a right carotid terminus occlusion, right middle cerebral artery M1  segment occlusion with poor opacification of the more distal right MCA branches/poor collateral flow. CTA neck 7/13 negative for acute occlusions. Pt given tenecteplase 7/13 at 13:11. Noted to be agitated and was subsequently intubated given need for procedure.   * Subsequently transferred to [facility] 7/13/2022 where she underwent above procedure.   7/14: Extubated. Head CT 7/14 showed evolution of acute infarct involving the R MCA distribution with increased swelling, cortical effacement, and new mild right-to-left midline shift; questionable hypoattenuation involving the bilateral occipital lobes which could be artifactual.   *  Additional stroke workup pursued this stay -- echo 7/14 showed EF 50%, grade 1 diastolic dysfunction   * Started ASA and atorvastatin per stroke team.   * Repeat head CT 7/15 showed evolving R MCA stroke with areas of edema, overall stable.    -- conts on full dose ASA and statin  -- goal SBP <140/90  -- 30d cardiac event monitor at discharge  -- initial plan was for TCU placement but as stay has progressed and given wound vac placement, now needing LTC  -- will need to follow up with [facility] in 6-8 wks (due beginning of September)     Anxiety   Cognitive impairment, confusion as above.   * As stay has progressed, patient has consistently been oriented x2 (to self, location), unable to state the year. Also noted to have intermittent situational confusion. States family members have been visiting when they have not.   * Ongoing issues with anxiety this stay -- had been on regimen of Seroquel 12.5mg at dinner and 25mg HS with 12.5mg po q6h prn available. Mirtazapine 7.5mg on 8/15 evening given decreased appetite  * Psych consulted for assistance with ongoing mgmt -- seen on 8/16 and meds adjusted. Seroquel HS changed to Abilify 5mg HS. Recommended Zyptexa 5mg q6h prn for breakthrough insomnia/agitation while going off Seroquel. Consider uptitation of Abilify per psych recs. Advised uptitration of sertraline (25mg x3d then increase to 50mg daily beginning 8/20) and Remeron stopped. Also advised to liberalize Ativan to 0.5mg q6h prn.      -- cont current regimen of meds including Abilify 5mg every evening, Zyprexa 2.5mg HS and sertraline 50mg daily  -- cont prns per psych including Ativan 0.5mg q6h prn, Zyprexa 5mg q6h prn -- none needed in recent days  -- psych consult appreciated on 8/24 increased Abilify from 5 to 10 mg.      Urinary retention  * Retaining urine on 8/14. UA WNL, not worrisome for infection  * Requiring straight cath x1 on 8/20 PM    -- monitor PVRs, straight cath prn     Dysphagia due to CVA: Improved  *  Seen by SLP and noted with dysphagia. Required some intermittent fluid boluses.   * Was eventually able to advance to regular diet w/thin liquids     Dyslipidemia  * FLP this stay showed tot cholest 163, HDL 47, LDL 91, .   * Started on atorvastatin 40 mg daily     Volume overload dt diastolic CHF exacerbation: Improved  Essential hypertension  Hypokalemia on lasix   * Not on/needing meds PTA.   * As above, echo this stay showed EF 50% with grade I diastolic dysfunction; RV not well visualized but appeared mild-moderately dilated with global systolic function probably mildly reduced.   * BPs were initially soft this stay and required IVFs. BPs improved.   * Developed pedal edema required IV Lasix. Ultimately transition to oral Lasix.  * Started on lisinopril this stay  -- cont lisinopril 10mg daily  -- cont Lasix BID (40 mg po in AM, 20 mg po in afternoon)  -- Started on potassium this stay. Increased potassium to 20 mg TID on 08/24/22. In addition to replacement protocol  -- Daily weights appreciated. Wt down on 08/24/22  -- K ordered for 8/26    Vitals:    07/23/22 1102 07/28/22 1508 08/05/22 0926 08/14/22 0600   Weight: (!) 174.6 kg (385 lb) (!) 159.2 kg (350 lb 14.4 oz) (!) 155.6 kg (343 lb) (!) 153.9 kg (339 lb 3.2 oz)    08/24/22 0645   Weight: 143.8 kg (317 lb 1.6 oz)     I/O last 3 completed shifts:  In: 620 [P.O.:620]  Out: -   Recent Labs   Lab 08/24/22  0909 08/22/22  1259 08/20/22  0911   CR 0.56 0.54 0.66       Recent Labs   Lab 08/25/22  1435 08/24/22  2147 08/24/22  1506 08/24/22  0909 08/23/22  1637 08/22/22  1259   POTASSIUM 3.5 3.6 3.3* 3.2* 3.5 3.5   MAG 1.7  --   --  1.9 1.8  --        Hypophosphatemia: Resolved     Prolonged QTc   * EKG on 7/13 showed QTc 494.   * Repeat EKG on 7/30 (while on Levaquin) showed QTc table at 475. Has since completed course of abx as below.  * Had been monitoring on telemetry this stay. No concerning findings so tele was ultimately dc'd 8/19     Chronic  bilateral LE edema with chronic venous stasis dermatitis and chronic skin changes  Hx of LLE cellulitis  * Was hospitalized in 11/2021 for sepsis dt to pneumonia and LLE cellulitis.   * During this stay, BLE noted to be patty and edematous, no unilateral leg swelling appreciated; LLE had patchy areas of erythema, no fluctuance, no painful areas with palpation; legs warm to touch. Lymphedema consulted.   -- cont LE elevation, lymphedema wraps     Lower back/R flank wound/abscess, suspect dt pressure type injury, s/p surgical debridement on 7/30/22 and placement of wound vac on 8/2/22  OA  Hx of bilateral hip replacements   Hx of chronic neck and back pain  * Pt with significant back pain early on in hospital stay. Was requiring IV hydromorphone.  Dose had to be decreased dt somnolence.  * MRI L-spine in 2018 showed multilevel degenerative changes with mild central stenosis. Patient has chronic back pain, reports having it over the last 2 years.   * During this stay, patient was noted with 5-6cm by 2-3cm wound with swelling/fluid/blistering in a fold of her lower back, did not appear infected; this seemed to be the source of her pain. Padded dressing placed and WOC RN ordered. Pain initially improved.   * Was placed on course of Augmentin on 7/24.   * On 7/26, was re-evaluated by WOC RN. Wound appeared more erythematous and purulence expressed. Worsening with shear stress from lift. Procal <0.05, WBC WNL. Abx changed to IV clindamycin.  * Wound cultures obtained. On 7/28, wound grew proteus and staph aureus (MRSA neg). US neg for abscess.   * Lost IV access on 7/28 so abx were changed to oral clindamycin and oral levaquin. IV access re-established but was continued on oral abx.  * General surgery consulted, ultimately underwent excisional debridement of R flank abscess in OR on 7/30. Intraop cultures showed polymicrobial growth with proteus mirabilis x2 strains (both pan-sensitive), corynebacterium striatum and  enterococcus faecalis.  * Wound vac placed per general surgery on 8/2  * ID consulted on 8/2 and abx narrowed to Augmentin alone, completed an additional 5 days of treatment on 8/7.   -- wound vac mgmt per WOC RN, MWF changes  -- prns available for pain  -- cont regular repositioning     Suspected meningioma left parietal region, incidentally seen on CT  * Head CT 7/13 showed a calcified extra-axial mass overlying the left parietal region measuring 1.4 cm, potentially representing a meningioma.  * Will need serial monitoring OP after discharge.      Anemia, suspect dilutional component  * Hgb normal on admit. Hgb 11.5 on 7/16. No overt clinical signs of major bleeding.  * Hgb now stable at 12-13  -- monitor labs periodically      Recent Labs   Lab 08/22/22  1259   HGB 13.6       Intertriginous dermatitis  * Chronic and stable, cont clotrimazole powder     Constipation  * Continue sched bowel regimen     Morbid obesity  * BMI 59 on admission. Recommend aggressive dietary and lifestyle modifications as condition improves     Suspected sleep apnea  O2 drop to mid 80s overnight 8/14. Briefly placed on 4LPM, which she then removed and then slept okay with sats in 90s thereafter.  -- recommend sleep study as outpatient     Moderate malnutrition in context of acute illness and chronic disease  * Nutritionist following. Appetite poor this stay, needing encouragement to take po.      COVID-19 vaccination status  * Not vaccinated. Family contemplating vaccine.     FEN: no IVFs, lytes stable, regular diet  DVT Prophylaxis: Lovenox 40mg subQ BID  Code Status: Full Code     Disposition: Medically stable for discharge. Discharge once LTC placement found. SW following. Per discussion with SW on 8/21, patient reportedly needs COVID vaccine prior to discharge to LTC facility (per facility policy). Hospitalist earlier this stay had previously discussed with patient's daughter Kaylynn and family was reportedly contemplating. Hospitalist  called Kaylynn again on 8/21 to discuss again but no answer. Discussed with SW on 08/23/22. They have escalated this issue to higher level, and will be reaching out to family regarding the need for vaccination in order to find placement.     Clinically Significant Risk Factors Present on Admission                        Diet: Orders Placed This Encounter      Advance Diet as Tolerated      Regular Diet Adult Thin Liquids (level 0) (Upright position, alert, and assist as needed)     IVF: None   [place] Catheter: Not present     DVT Prophylaxis: Enoxaparin (Lovenox) SQ  Code Status: Full Code     Disposition: Expected discharge once placement found.   Communication: Discussed with RN, patient on 08/22/22    Vianca Teixeira MD  Hospitalist Service  [facility]  Securely message with the Vocera Web Console (learn more here)  Text page via Xamplified Paging/Directory    -Data reviewed today: I reviewed all new labs and imaging results over the last 24 hours. I personally reviewed no images or EKG's today.    Physical Exam   Temp: 98  F (36.7  C) Temp src: Oral BP: 121/64 Pulse: 83   Resp: 16 SpO2: 91 % O2 Device: None (Room air)    Vitals:    08/05/22 0926 08/14/22 0600 08/24/22 0645   Weight: (!) 155.6 kg (343 lb) (!) 153.9 kg (339 lb 3.2 oz) 143.8 kg (317 lb 1.6 oz)     Vital Signs with Ranges  Temp:  [97.2  F (36.2  C)-98  F (36.7  C)] 98  F (36.7  C)  Pulse:  [80-83] 83  Resp:  [16-20] 16  BP: ()/(59-64) 121/64  SpO2:  [91 %-92 %] 91 %  I/O last 3 completed shifts:  In: 620 [P.O.:620]  Out: -     Today's Exam  Constitutional:  NAD, disheveled.   Neuropsyche:  alert and oriented to person only. Answers simple questions appropriately.   Respiratory:  Breathing comfortably, good air exchange, no wheezes, no crackles.   Cardiovascular:  Regular rate and rhythm, 1-2+ edema.  GI:  soft, NT/ND, BS normal  Skin/Integumen:  No acute rash or sign of bleeding. Venous stasis changes of the lower  extremities.     Medications   All medications reviewed on 08/25/22    - MEDICATION INSTRUCTIONS -         acetaminophen  975 mg Oral Q8H     ARIPiprazole  10 mg Oral QPM     aspirin  325 mg Oral Daily     atorvastatin  40 mg Oral QPM     diclofenac  2 g Topical 4x Daily     enoxaparin ANTICOAGULANT  40 mg Subcutaneous Q12H     furosemide  20 mg Oral Daily at 4 pm     furosemide  40 mg Oral Daily     lactobacillus rhamnosus (GG)  1 capsule Oral BID     lisinopril  10 mg Oral Daily     miconazole   Topical BID     multivitamin, therapeutic  1 tablet Oral Daily     OLANZapine  2.5 mg Oral At Bedtime     polyethylene glycol  17 g Oral Daily     potassium chloride  20 mEq Oral TID     pramoxine   Topical TID     senna-docusate  1-2 tablet Oral BID     sertraline  50 mg Oral Daily     PRN Meds: sore throat lozenge, bisacodyl, calcium carbonate, hydrALAZINE, hydrOXYzine **OR** hydrOXYzine, labetalol, lidocaine 4%, lidocaine (buffered or not buffered), LORazepam, - MEDICATION INSTRUCTIONS -, naloxone **OR** naloxone **OR** naloxone **OR** naloxone, OLANZapine zydis, ondansetron **OR** ondansetron, oxyCODONE    Data   Recent Labs   Lab 08/25/22  1435 08/24/22  2147 08/24/22  1506 08/24/22  0909 08/23/22  1637 08/22/22  1259 08/20/22  0911   WBC  --   --   --   --   --  7.2  --    HGB  --   --   --   --   --  13.6  --    MCV  --   --   --   --   --  88  --      --   --   --   --  285 300   NA  --   --   --  141  --  141  --    POTASSIUM 3.5 3.6 3.3* 3.2*   < > 3.5  --    CHLORIDE  --   --   --  104  --  107  --    CO2  --   --   --  31  --  26  --    BUN  --   --   --  12  --  15  --    CR  --   --   --  0.56  --  0.54 0.66   ANIONGAP  --   --   --  6  --  8  --    ASHIA  --   --   --  8.8  --  8.9  --    GLC  --   --   --  95  --  106*  --     < > = values in this interval not displayed.       No results found for this or any previous visit (from the past 24 hour(s)).

## 2022-08-25 NOTE — PLAN OF CARE
Goal Outcome Evaluation:    Plan of Care Reviewed With: patient     Overall Patient Progress: no change    Outcome Evaluation: regular diet with continued poor appetite and poor PO intake. will not take any supplements, have tried them all, will discontinue. medically stable for discharge.     Donna Ho RD, LD

## 2022-08-26 NOTE — PLAN OF CARE
Goal Outcome Evaluation:  Orientation/Cognitive: A&O to self, time and place, can be very forgetful  Mobility Level/Assist Equipment: Heavy assist of 2 w/ lift T/R q2  Fall Risk (Y/N): Y  Behavior Concerns:Intermitently hallucinating seeing people and animals  Pain Management:Denies pain   Tele/VS/O2: VSS on RA  ABNL Lab/BG: K:3.5 ma.7  Diet:Regular  Bowel/Bladder: incontinent, small soft stool   Skin Concerns: Bilat edema, scattered bruising, rash under breast , R flank dressing CDI  Drains/Devices: Purewick   Tests/Procedures for next shift: AM labs  Anticipated DC date & active delays:Awaiting placement

## 2022-08-26 NOTE — PLAN OF CARE
"Pt is disoriented to time, situation, and place. Regular diet, Ax2 w/ lift but has been standing/stepping with PT. Writer has been encouraging OOB activity, however Pt does refuses and states getting up will hurt, but refuses premedication before activity. Pt is experiencing auditory and visual hallucinations. Premedicated w/ ativan today per request of PT as it reportedly helped yesterday. Several incontinent episodes this shift requiring linen changes. BM today. Purewick in place w/ adequate output- PVR, WDL. Takes pills whole w/ water- easily fatigues w/ taking multiple pills. Often refuses Potassium supplement because it is \"too salty\" but does drink dose w/ encouragement. Purewick in place.   "

## 2022-08-26 NOTE — PROGRESS NOTES
"[facility]    Hospitalist Progress Note    Date of Service (when I saw the patient): 08/26/2022  Admit date: 7/13/2022    Interval History   Full details of events over last 24 hours outlined below.   Cristy states she wants to get up and walk more but \"they are not letting me.\" Nurse at bedside and assured her this was not the case. She has been getting up and walking.  Note she had therapy yesterday and therapy scheduled for today.   Denies any SOB, CP, abdominal pain, N/V/D.    Assessment & Plan   Cristy Bailye is a 77 year old female with a PMHx significant for morbid obesity, hypertension, and hyperlipidemia, who presented to [facility] 7/13/2022 with complete left-sided paralysis, left-sided facial droop, and difficulty speaking. CTA head remarkable for right MCA occlusion. She was given tenecteplase and subsequently transferred to [facility] 7/13/2022 for thrombectomy.      Hospital stay complicated by flank wound which required surgical debridement and placement of wound vac and findings LTC placement.     Acute R MCA ischemic stroke with edema and right to left midline shift  S/p tenecteplase and subsequent R MCA mechanical thrombectomy 7/13/22  * Presented to [facility] 7/13 with complete paralysis, left-sided facial droop, and aphasia. Code stroke initiated. Head CT 7/13 showed signs of an evolving R MCA infarct. CTA head 7/13 showed a right carotid terminus occlusion, right middle cerebral artery M1  segment occlusion with poor opacification of the more distal right MCA branches/poor collateral flow. CTA neck 7/13 negative for acute occlusions. Pt given tenecteplase 7/13 at 13:11. Noted to be agitated and was subsequently intubated given need for procedure.   * Subsequently transferred to [facility] 7/13/2022 where she underwent above procedure.   7/14: Extubated. Head CT 7/14 showed evolution of acute infarct involving the R MCA distribution with increased swelling, " cortical effacement, and new mild right-to-left midline shift; questionable hypoattenuation involving the bilateral occipital lobes which could be artifactual.   * Additional stroke workup pursued this stay -- echo 7/14 showed EF 50%, grade 1 diastolic dysfunction   * Started ASA and atorvastatin per stroke team.   * Repeat head CT 7/15 showed evolving R MCA stroke with areas of edema, overall stable.    -- conts on full dose ASA and statin  -- goal SBP <140/90  -- 30d cardiac event monitor at discharge  -- initial plan was for TCU placement but as stay has progressed and given wound vac placement, now needing LTC  -- will need to follow up with [facility] in 6-8 wks (due beginning of September)     Anxiety   Cognitive impairment, confusion as above.   * As stay has progressed, patient has consistently been oriented x2 (to self, location), unable to state the year. Also noted to have intermittent situational confusion. States family members have been visiting when they have not.   * Ongoing issues with anxiety this stay -- had been on regimen of Seroquel 12.5mg at dinner and 25mg HS with 12.5mg po q6h prn available. Mirtazapine 7.5mg on 8/15 evening given decreased appetite  * Psych consulted for assistance with ongoing mgmt -- seen on 8/16 and meds adjusted. Seroquel HS changed to Abilify 5mg HS. Recommended Zyptexa 5mg q6h prn for breakthrough insomnia/agitation while going off Seroquel. Consider uptitation of Abilify per psych recs. Advised uptitration of sertraline (25mg x3d then increase to 50mg daily beginning 8/20) and Remeron stopped. Also advised to liberalize Ativan to 0.5mg q6h prn.      -- cont current regimen of meds including Abilify 5mg every evening, Zyprexa 2.5mg HS and sertraline 50mg daily  -- cont prns per psych including Ativan 0.5mg q6h prn, Zyprexa 5mg q6h prn > received one dose olanzapine on 8/25, ativan x 1 on 08/26/22  -- psych consult appreciated on 8/24, increased Abilify from 5 to 10 mg.       Urinary retention, RESOLVED   * Retaining urine on 8/14. UA WNL, not worrisome for infection  * Requiring straight cath x1 on 8/20 PM  * Good UOP, no longer requiring straight cath. No evidence of obstruction on PVR.     Dysphagia due to CVA: Improved  * Seen by SLP and noted with dysphagia. Required some intermittent fluid boluses.   * Was eventually able to advance to regular diet w/thin liquids     Dyslipidemia  * FLP this stay showed tot cholest 163, HDL 47, LDL 91, .   * Started on atorvastatin 40 mg daily     Volume overload dt diastolic CHF exacerbation: Improved  Essential hypertension  Hypokalemia on lasix   * Not on/needing meds PTA.   * As above, echo this stay showed EF 50% with grade I diastolic dysfunction; RV not well visualized but appeared mild-moderately dilated with global systolic function probably mildly reduced.   * BPs were initially soft this stay and required IVFs. BPs improved.   * Developed pedal edema required IV Lasix. Ultimately transition to oral Lasix.  * Started on lisinopril this stay  -- cont lisinopril 10mg daily  -- cont Lasix BID (40 mg po in AM, 20 mg po in afternoon)  -- Started on potassium this stay. Increased potassium to 20 mg TID on 08/24/22. In addition to replacement protocol, but missed scheduled dose on 8/25/22 and 8/24/22   -- Daily weights appreciated. Wt down on 08/24/22  -- Discussed with RN importance of scheduled K given recurring hypokalemia.     Vitals:    07/23/22 1102 07/28/22 1508 08/05/22 0926 08/14/22 0600   Weight: (!) 174.6 kg (385 lb) (!) 159.2 kg (350 lb 14.4 oz) (!) 155.6 kg (343 lb) (!) 153.9 kg (339 lb 3.2 oz)    08/24/22 0645   Weight: 143.8 kg (317 lb 1.6 oz)     No intake/output data recorded.  Recent Labs   Lab 08/24/22  0909 08/22/22  1259 08/20/22  0911   CR 0.56 0.54 0.66       Recent Labs   Lab 08/26/22  0931 08/25/22  1435 08/24/22  2147 08/24/22  1506 08/24/22  0909 08/23/22  1637   POTASSIUM 3.3* 3.5 3.6 3.3* 3.2* 3.5   MAG  1.8 1.7  --   --  1.9 1.8       Hypophosphatemia: Resolved     Prolonged QTc   * EKG on 7/13 showed QTc 494.   * Repeat EKG on 7/30 (while on Levaquin) showed QTc table at 475. Has since completed course of abx as below.  * Had been monitoring on telemetry this stay. No concerning findings so tele was ultimately dc'd 8/19     Chronic bilateral LE edema with chronic venous stasis dermatitis and chronic skin changes  Hx of LLE cellulitis  * Was hospitalized in 11/2021 for sepsis dt to pneumonia and LLE cellulitis.   * During this stay, BLE noted to be patty and edematous, no unilateral leg swelling appreciated; LLE had patchy areas of erythema, no fluctuance, no painful areas with palpation; legs warm to touch. Lymphedema consulted.   -- cont LE elevation, lymphedema wraps     Lower back/R flank wound/abscess, suspect dt pressure type injury, s/p surgical debridement on 7/30/22 and placement of wound vac on 8/2/22  OA  Hx of bilateral hip replacements   Hx of chronic neck and back pain  * Pt with significant back pain early on in hospital stay. Was requiring IV hydromorphone.  Dose had to be decreased dt somnolence.  * MRI L-spine in 2018 showed multilevel degenerative changes with mild central stenosis. Patient has chronic back pain, reports having it over the last 2 years.   * During this stay, patient was noted with 5-6cm by 2-3cm wound with swelling/fluid/blistering in a fold of her lower back, did not appear infected; this seemed to be the source of her pain. Padded dressing placed and WOC RN ordered. Pain initially improved.   * Was placed on course of Augmentin on 7/24.   * On 7/26, was re-evaluated by WOC RN. Wound appeared more erythematous and purulence expressed. Worsening with shear stress from lift. Procal <0.05, WBC WNL. Abx changed to IV clindamycin.  * Wound cultures obtained. On 7/28, wound grew proteus and staph aureus (MRSA neg). US neg for abscess.   * Lost IV access on 7/28 so abx were changed to  oral clindamycin and oral levaquin. IV access re-established but was continued on oral abx.  * General surgery consulted, ultimately underwent excisional debridement of R flank abscess in OR on 7/30. Intraop cultures showed polymicrobial growth with proteus mirabilis x2 strains (both pan-sensitive), corynebacterium striatum and enterococcus faecalis.  * Wound vac placed per general surgery on 8/2 >> discontinued.   * ID consulted on 8/2 and abx narrowed to Augmentin alone, completed an additional 5 days of treatment on 8/7.   -- wound per WOC RN, follows weekly, last check 8/24  -- prns available for pain  -- cont regular repositioning     Suspected meningioma left parietal region, incidentally seen on CT  * Head CT 7/13 showed a calcified extra-axial mass overlying the left parietal region measuring 1.4 cm, potentially representing a meningioma.    Will need serial monitoring OP after discharge.      Anemia, suspect dilutional component  * Hgb normal on admit. Hgb 11.5 on 7/16. No overt clinical signs of major bleeding.  * Hgb now stable at 12-13  -- monitor labs periodically      Recent Labs   Lab 08/22/22  1259   HGB 13.6       Intertriginous dermatitis  * Chronic and stable, cont clotrimazole powder     Constipation  * Continue sched bowel regimen     Morbid obesity  * BMI 59 on admission. Recommend aggressive dietary and lifestyle modifications as condition improves     Suspected sleep apnea  O2 drop to mid 80s overnight 8/14. Briefly placed on 4LPM, which she then removed and then slept okay with sats in 90s thereafter.    recommend sleep study as outpatient     Moderate malnutrition in context of acute illness and chronic disease  * Nutritionist following. Appetite poor this stay, needing encouragement to take po.      COVID-19 vaccination status  * Not vaccinated. Family contemplating vaccine.     FEN: no IVFs, lytes stable, regular diet  DVT Prophylaxis: Lovenox 40mg subQ BID  Code Status: Full  Code     Disposition: Medically stable for discharge. Discharge once LTC placement found. SW following. Per discussion with SW on 8/21, patient reportedly needs COVID vaccine prior to discharge to LTC facility (per facility policy). Hospitalist earlier this stay had previously discussed with patient's daughter Kaylynn and family was reportedly contemplating. Hospitalist called Kaylynn again on 8/21 to discuss again but no answer. Discussed with SW on 08/23/22. They have escalated this issue to higher level, and will be reaching out to family regarding the need for vaccination in order to find placement. Discussed with SW on 08/26/22. Still unable to place due to vaccination. Awaiting administration to get back to them regarding plan given family needs to agree to vaccination to be placed.     Clinically Significant Risk Factors Present on Admission                        Diet: Orders Placed This Encounter      Advance Diet as Tolerated      Regular Diet Adult Thin Liquids (level 0) (Upright position, alert, and assist as needed)     IVF: None   [place] Catheter: Not present     DVT Prophylaxis: Enoxaparin (Lovenox) SQ  Code Status: Full Code     Disposition: Expected discharge once placement found.   Communication: Discussed with RN, SO, Leo, TONY and patient on 08/26/22    Vianca Teixeira MD  Hospitalist Service  [facility]  Securely message with the [email or web address] Web Console (learn more here)  Text page via [employer] Paging/Director      -Data reviewed today: I reviewed all new labs and imaging results over the last 24 hours. I personally reviewed no images or EKG's today.    Physical Exam   Temp: 98.5  F (36.9  C) Temp src: Oral BP: 124/71 Pulse: 74   Resp: 16 SpO2: 94 % O2 Device: None (Room air)    Vitals:    08/05/22 0926 08/14/22 0600 08/24/22 0645   Weight: (!) 155.6 kg (343 lb) (!) 153.9 kg (339 lb 3.2 oz) 143.8 kg (317 lb 1.6 oz)     Vital Signs with Ranges  Temp:  [97.6  F (36.4  C)-98.5  F (36.9   C)] 98.5  F (36.9  C)  Pulse:  [74-80] 74  Resp:  [16-18] 16  BP: (113-124)/(63-71) 124/71  SpO2:  [94 %-95 %] 94 %  No intake/output data recorded.    Today's Exam  Constitutional:  NAD, disheveled.   Neuropsyche:  alert and oriented to person only. Answers simple questions appropriately.   Respiratory:  Breathing comfortably, good air exchange, no wheezes, no crackles.   Cardiovascular:  Regular rate and rhythm, 1-2+ edema.  GI:  soft, NT/ND, BS normal  Skin/Integumen:  No acute rash or sign of bleeding. Venous stasis changes of the lower extremities.     Medications   All medications reviewed on 08/26/22    - MEDICATION INSTRUCTIONS -         acetaminophen  975 mg Oral Q8H     ARIPiprazole  10 mg Oral QPM     aspirin  325 mg Oral Daily     atorvastatin  40 mg Oral QPM     diclofenac  2 g Topical 4x Daily     enoxaparin ANTICOAGULANT  40 mg Subcutaneous Q12H     furosemide  20 mg Oral Daily at 4 pm     furosemide  40 mg Oral Daily     lactobacillus rhamnosus (GG)  1 capsule Oral BID     lisinopril  10 mg Oral Daily     miconazole   Topical BID     multivitamin, therapeutic  1 tablet Oral Daily     OLANZapine  2.5 mg Oral At Bedtime     polyethylene glycol  17 g Oral Daily     potassium chloride  20 mEq Oral TID     pramoxine   Topical TID     senna-docusate  1-2 tablet Oral BID     sertraline  50 mg Oral Daily     PRN Meds: sore throat lozenge, bisacodyl, calcium carbonate, hydrALAZINE, hydrOXYzine **OR** hydrOXYzine, labetalol, lidocaine 4%, lidocaine (buffered or not buffered), LORazepam, - MEDICATION INSTRUCTIONS -, naloxone **OR** naloxone **OR** naloxone **OR** naloxone, OLANZapine zydis, ondansetron **OR** ondansetron, oxyCODONE    Data   Recent Labs   Lab 08/26/22  0931 08/25/22  1435 08/24/22  2147 08/24/22  1506 08/24/22  0909 08/23/22  1637 08/22/22  1259 08/20/22  0911   WBC  --   --   --   --   --   --  7.2  --    HGB  --   --   --   --   --   --  13.6  --    MCV  --   --   --   --   --   --  88  --     PLT  --  305  --   --   --   --  285 300   NA  --   --   --   --  141  --  141  --    POTASSIUM 3.3* 3.5 3.6   < > 3.2*   < > 3.5  --    CHLORIDE  --   --   --   --  104  --  107  --    CO2  --   --   --   --  31  --  26  --    BUN  --   --   --   --  12  --  15  --    CR  --   --   --   --  0.56  --  0.54 0.66   ANIONGAP  --   --   --   --  6  --  8  --    ASHIA  --   --   --   --  8.8  --  8.9  --    GLC  --   --   --   --  95  --  106*  --     < > = values in this interval not displayed.       No results found for this or any previous visit (from the past 24 hour(s)).

## 2022-08-27 NOTE — PLAN OF CARE
"Goal Outcome Evaluation:    Plan of Care Reviewed With: patient     Reason for Admission: Admitted 7/15 with R MCA stroke    Cognitive/Mentation: A/Ox2 - person and knows she's in the hospital.   Neuros/CMS: BLE weakness, generalized weakness, confusion.   VS: Stable on RA  Tele: n/a  GI: BS active, incontinent. Multiple loose BMs this evening.  : Incontinent  Pulmonary: LS clear  Pain: Frequently says \"that hurts, you're hurting me\". Scheduled tylenol given    Drains/Lines: No PIV or drains  Skin: Dry/peeling BLE and feet, buttocks/perineal area cleansed per wound care orders, interdry in skin folds. R back crease wound covered with mepliex, wound care every third day.   Activity: Bedrest/Lift/A2-3  Diet: Regular, poor appetite - needs lots of encouragement to eat/drink    Therapies recs: LTC  Discharge: pending placement, barrier due to not being vaccinated for covid    Aggression Stoplight Tool: yellow for confusion    End of shift summary: stable this shift - no new changes aside from BMs today.     "

## 2022-08-27 NOTE — PLAN OF CARE
"Goal Outcome Evaluation:    Plan of Care Reviewed With: patient    Increased confusion, delirium, and hallucination noted overnight. Patient refused all her PRN meds says \"Leo gave me a lot of meds, I don't need any more meds\". Vitals stable, no changes in neuro. Pending TCU discharge.            "

## 2022-08-27 NOTE — PLAN OF CARE
Goal Outcome Evaluation:    Plan of Care Reviewed With: patient     Overall Patient Progress: no change    Reason for Admission: R MCA ischemic stroke    Cognitive/Mentation: A/Ox person and situation. Disoriented to place and time. Forgetful at times.  Neuros/CMS: Intact ex 4/5 bilat UE strength, 2/5 bilat LE strength. Numbness and tingling in RLE.  VS: 2x Daily. VSS.   GI: BS active, + flatus, last BM 8/27/22. Incontinent.  : Purwick in place. Incontinent.  Pulmonary: LS clear.  Pain: none.     Drains/Lines: None.  Skin: 3+ edema in legs, ankles, and feet. Peeling, cracked bilateral feet, toes. R flank wound. Miconazole powder in skin folds.   Activity: Assist x 2 with lift. Up in the chair x1 today.  Diet: Regular with thin liquids. Takes pills whole with apple sauce.     Therapies recs: TCU    Discharge: Pending    Aggression Stoplight Tool: GREEN    End of shift summary: 30d cardiac monitor at discharge.

## 2022-08-28 NOTE — PROGRESS NOTES
[facility]    Medicine Progress Note - Hospitalist Service    Date of Admission:  7/13/2022    Assessment & Plan        Full details of events over last 24 hours outlined below.   Patient had just had BM in bed and getting help with cleaning up.   She offered no complaints.  When we discussed that we are waiting for placement in TCU once we can get approval for vaccination, she began crying at the thought she will not be going home.  We discussed she must be stronger in order to return home safely.  She shows no insight regarding her current deconditioned state and inability to be cared for at home by family.  I assured her if she shows improved strength, mobility, we discharge her home.  Discussed with social work.  We have not yet received consent from family for COVID vaccination, so that she can be admitted to nonprivate room at LT. I offered to call family but social work stated they were going to call today to explain we have no options for placement if she is not vaccinated.    - 8/28- covid vacc was consented for and ordered, hopefully will open dispo options           Assessment & Plan     Cristy Bailey is a 77 year old female with a PMHx significant for morbid obesity, hypertension, and hyperlipidemia, who presented to [facility] 7/13/2022 with complete left-sided paralysis, left-sided facial droop, and difficulty speaking. CTA head remarkable for right MCA occlusion. She was given tenecteplase and subsequently transferred to [facility] 7/13/2022 for thrombectomy.      Hospital stay complicated by flank wound which required surgical debridement and placement of wound vac and findings LTC placement.     Acute R MCA ischemic stroke with edema and right to left midline shift  S/p tenecteplase and subsequent R MCA mechanical thrombectomy 7/13/22  * Presented to [facility] 7/13 with complete paralysis, left-sided facial droop, and aphasia. Code stroke initiated. Head CT  7/13 showed signs of an evolving R MCA infarct. CTA head 7/13 showed a right carotid terminus occlusion, right middle cerebral artery M1  segment occlusion with poor opacification of the more distal right MCA branches/poor collateral flow. CTA neck 7/13 negative for acute occlusions. Pt given tenecteplase 7/13 at 13:11. Noted to be agitated and was subsequently intubated given need for procedure.   * Subsequently transferred to [facility] 7/13/2022 where she underwent above procedure.   7/14: Extubated. Head CT 7/14 showed evolution of acute infarct involving the R MCA distribution with increased swelling, cortical effacement, and new mild right-to-left midline shift; questionable hypoattenuation involving the bilateral occipital lobes which could be artifactual.   * Additional stroke workup pursued this stay -- echo 7/14 showed EF 50%, grade 1 diastolic dysfunction   * Started ASA and atorvastatin per stroke team.   * Repeat head CT 7/15 showed evolving R MCA stroke with areas of edema, overall stable.     -- conts on full dose ASA and statin  -- goal SBP <140/90 and met.   -- 30d cardiac event monitor at discharge  -- will need to follow up with [facility] in 6-8 wks (due beginning of September)     Anxiety   Cognitive impairment, confusion as above.   * As stay has progressed, patient has consistently been oriented x2 (to self, location), unable to state the year. Also noted to have intermittent situational confusion. States family members have been visiting when they have not.   * Ongoing issues with anxiety this stay -- had been on regimen of Seroquel 12.5mg at dinner and 25mg HS with 12.5mg po q6h prn available. Mirtazapine 7.5mg on 8/15 evening given decreased appetite  * Psych consulted for assistance with ongoing mgmt -- seen on 8/16 and meds adjusted. Seroquel HS changed to Abilify 5mg HS. Recommended Zyptexa 5mg q6h prn for breakthrough insomnia/agitation while going off Seroquel. Consider uptitation of Abilify  per psych recs. Advised uptitration of sertraline (25mg x3d then increase to 50mg daily beginning 8/20) and Remeron stopped. Also advised to liberalize Ativan to 0.5mg q6h prn.       -- cont current regimen of meds including Abilify 5mg every evening, Zyprexa 2.5mg HS and sertraline 50mg daily  -- cont prns per psych including Zyprexa 5mg q6h prn (last dose given 8/25)   -- Stopped PRN ativan on 08/27/22 as leading to increased confusion. Stopped hydroxyzine as well.   -- psych consult appreciated on 8/24, increased Abilify from 5 to 10 mg.      Urinary retention, RESOLVED   * Retaining urine on 8/14. UA WNL, not worrisome for infection  * Requiring straight cath x1 on 8/20 PM  * Good UOP, no longer requiring straight cath. No evidence of obstruction on PVR.      Dysphagia due to CVA: Improved  * Seen by SLP and noted with dysphagia. Required some intermittent fluid boluses.   * Was eventually able to advance to regular diet w/thin liquids     Dyslipidemia  * FLP this stay showed tot cholest 163, HDL 47, LDL 91, .   * Started on atorvastatin 40 mg daily     Volume overload dt diastolic CHF exacerbation: Improved  Essential hypertension  Hypokalemia on lasix   * Not on/needing meds PTA.   * As above, echo this stay showed EF 50% with grade I diastolic dysfunction; RV not well visualized but appeared mild-moderately dilated with global systolic function probably mildly reduced.   * BPs were initially soft this stay and required IVFs. BPs improved.   * Developed pedal edema required IV Lasix. Ultimately transition to oral Lasix.  * Started on lisinopril this stay  -- cont lisinopril 10mg daily  -- cont Lasix BID (40 mg po in AM, 20 mg po in afternoon)  -- Started on potassium this stay. Increased potassium to 20 mg TID on 08/24/22,. In addition to replacement protocol, but missed scheduled dose on 8/25/22 and 8/24/22   -- Daily weights appreciated. Wt down on 08/24/22  -- Not consistently tolerating potassium in  packet form > changed to pills on 08/27/22  -- Recheck K on 8/29.             Vitals:     07/23/22 1102 07/28/22 1508 08/05/22 0926 08/14/22 0600   Weight: (!) 174.6 kg (385 lb) (!) 159.2 kg (350 lb 14.4 oz) (!) 155.6 kg (343 lb) (!) 153.9 kg (339 lb 3.2 oz)     08/24/22 0645   Weight: 143.8 kg (317 lb 1.6 oz)      I/O last 3 completed shifts:  In: 240 [P.O.:240]  Out: 850 [Urine:850]       Recent Labs   Lab 08/24/22  0909 08/22/22  1259   CR 0.56 0.54                   Recent Labs   Lab 08/27/22  1305 08/26/22  0931 08/25/22  1435 08/24/22  2147 08/24/22  1506 08/24/22  0909 08/23/22  1637   POTASSIUM 3.4 3.3* 3.5 3.6 3.3* 3.2* 3.5   MAG 1.7 1.8 1.7  --   --  1.9 1.8         Hypophosphatemia: Resolved     Prolonged QTc   * EKG on 7/13 showed QTc 494.   * Repeat EKG on 7/30 (while on Levaquin) showed QTc table at 475. Has since completed course of abx as below.  * Had been monitoring on telemetry this stay. No concerning findings so tele was ultimately dc'd 8/19     Chronic bilateral LE edema with chronic venous stasis dermatitis and chronic skin changes  Hx of LLE cellulitis  * Was hospitalized in 11/2021 for sepsis dt to pneumonia and LLE cellulitis.   * During this stay, BLE noted to be patty and edematous, no unilateral leg swelling appreciated; LLE had patchy areas of erythema, no fluctuance, no painful areas with palpation; legs warm to touch. Lymphedema consulted.   -- cont LE elevation, lymphedema wraps     Lower back/R flank wound/abscess, suspect dt pressure type injury, s/p surgical debridement on 7/30/22 and placement of wound vac on 8/2/22  OA  Hx of bilateral hip replacements   Hx of chronic neck and back pain  * Pt with significant back pain early on in hospital stay. Was requiring IV hydromorphone.  Dose had to be decreased dt somnolence.  * MRI L-spine in 2018 showed multilevel degenerative changes with mild central stenosis. Patient has chronic back pain, reports having it over the last 2 years.    * During this stay, patient was noted with 5-6cm by 2-3cm wound with swelling/fluid/blistering in a fold of her lower back, did not appear infected; this seemed to be the source of her pain. Padded dressing placed and WOC RN ordered. Pain initially improved.   * Was placed on course of Augmentin on 7/24.   * On 7/26, was re-evaluated by WOC RN. Wound appeared more erythematous and purulence expressed. Worsening with shear stress from lift. Procal <0.05, WBC WNL. Abx changed to IV clindamycin.  * Wound cultures obtained. On 7/28, wound grew proteus and staph aureus (MRSA neg). US neg for abscess.   * Lost IV access on 7/28 so abx were changed to oral clindamycin and oral levaquin. IV access re-established but was continued on oral abx.  * General surgery consulted, ultimately underwent excisional debridement of R flank abscess in OR on 7/30. Intraop cultures showed polymicrobial growth with proteus mirabilis x2 strains (both pan-sensitive), corynebacterium striatum and enterococcus faecalis.  * Wound vac placed per general surgery on 8/2 >> now discontinued.   * ID consulted on 8/2 and abx narrowed to Augmentin alone, completed an additional 5 days of treatment on 8/7.   -- wound per WOC RN, follows weekly, last check 8/24  -- prns available for pain  -- cont regular repositioning     Suspected meningioma left parietal region, incidentally seen on CT  * Head CT 7/13 showed a calcified extra-axial mass overlying the left parietal region measuring 1.4 cm, potentially representing a meningioma.    Will need serial monitoring OP after discharge.      Anemia, suspect dilutional component, RESOLVED  * Hgb normal on admit. Hgb 11.5 on 7/16. No overt clinical signs of major bleeding.  * Hgb now stable at 12-13         Recent Labs   Lab 08/22/22  1259   HGB 13.6         Intertriginous dermatitis  * Chronic and stable, cont clotrimazole powder     Constipation  * Continue sched bowel regimen     Morbid obesity  * BMI 59 on  admission. Recommend aggressive dietary and lifestyle modifications as condition improves     Suspected sleep apnea  O2 drop to mid 80s overnight 8/14. Briefly placed on 4LPM, which she then removed and then slept okay with sats in 90s thereafter.    recommend sleep study as outpatient     Moderate malnutrition in context of acute illness and chronic disease  * Nutritionist following. Appetite poor this stay, needing encouragement to take po.      COVID-19 vaccination status  * Not vaccinated. Family has not consented for vaccine and this is limited placement.           Diet: Advance Diet as Tolerated  Room Service  Regular Diet Adult Thin Liquids (level 0) (Upright position, alert, and assist as needed)    DVT Prophylaxis: Enoxaparin (Lovenox) SQ  [place] Catheter: Not present  Central Lines: None  Cardiac Monitoring: None  Code Status: Full Code      Disposition Plan     unclear - pending TCU acceptance    The patient's care was discussed with the Bedside Nurse and Patient.    Ramu Barcenas MD  Hospitalist Service  [facility]  Securely message with the Vocera Web Console (learn more here)  Text page via EverPresent Paging/Directory         Clinically Significant Risk Factors Present on Admission                      ______________________________________________________________________    Interval History   Care assumed for today.  Patient seen and examined this afternoon.  Calm, pleasant, and awake.  Denies any complaints of new pain, shortness of breath, fevers or chills.  Discussed and acknowledged that she is hopeful to go home but seems to understand that she needs to recuperate her strength at TCU prior to doing so    Data reviewed today: I reviewed all medications, new labs and imaging results over the last 24 hours. I personally reviewed no images or EKG's today.    Physical Exam   Vital Signs: Temp: 98.2  F (36.8  C) Temp src: Oral BP: 109/50 Pulse: 80   Resp: 18 SpO2: 93 % O2  Device: None (Room air)    Weight: 317 lbs 1.6 oz    Gen: NAD, pleasant  HEENT: EOMI, MMM  Resp: no crackles,  no wheezes, no increased work of resp  CV: S1S2 heard, reg rhythm, reg rate  Abdo: soft, nontender, nondistended, bowel sounds present  Ext: calves nontender, well perfused  Neuro: awake and alert, CN grossly intact, no facial asymmetry      Data   Recent Labs   Lab 08/28/22  1432 08/28/22  0727 08/27/22  1305 08/26/22  0931 08/25/22  1435 08/24/22  1506 08/24/22  0909 08/23/22  1637 08/22/22  1259   WBC  --   --   --   --   --   --   --   --  7.2   HGB  --   --   --   --   --   --   --   --  13.6   MCV  --   --   --   --   --   --   --   --  88     --   --   --  305  --   --   --  285   NA  --   --   --   --   --   --  141  --  141   POTASSIUM 3.9 2.9* 3.4   < > 3.5   < > 3.2*   < > 3.5   CHLORIDE  --   --   --   --   --   --  104  --  107   CO2  --   --   --   --   --   --  31  --  26   BUN  --   --   --   --   --   --  12  --  15   CR  --   --   --   --   --   --  0.56  --  0.54   ANIONGAP  --   --   --   --   --   --  6  --  8   ASHIA  --   --   --   --   --   --  8.8  --  8.9   GLC  --   --   --   --   --   --  95  --  106*    < > = values in this interval not displayed.     No results found for this or any previous visit (from the past 24 hour(s)).

## 2022-08-28 NOTE — PROGRESS NOTES
"Care Management Follow Up    Length of Stay (days): 46    Expected Discharge Date:       Concerns to be Addressed:       Patient plan of care discussed at interdisciplinary rounds: Yes    Anticipated Discharge Disposition: Pt is needing LTC placement  Anticipated Discharge Services:    Anticipated Discharge DME:      Patient/family educated on Medicare website which has current facility and service quality ratings:    Education Provided on the Discharge Plan:  yes  Patient/Family in Agreement with the Plan:   Pt's vaccination status has been a major barrier for LTC placement for pt as the LTC units that have accepted pt are unable to quarantine pt due to no private rooms.  If pt is unvaccinated, she must quarantine for 2 weeks.   It had been reported that pt/family refused vaccination for pt.  TONY called pt's S.O. and HCA, Leo regarding vaccination. He states he is in agreement with the vaccination and stated that he told pt to get the vaccine. TONY also called pt's daughter, Kaylynn but was unable to reach her and her voice message was full so unable to leave a message.  TONY contacted son, Luke and spoke with him at length of the need for vaccination. He is in complete agreement and states \"go ahead and do it and I will speak with Kaylynn.\" TONY attempted to speak to all family so there is no dispute of pt receiving vaccination, however,   Leo is pt's HCA and the identified decision maker for all health care decisions.  TONY paged harish to order covid-19 vaccine for pt to be given today      Referrals Placed by CM/SW:  Will need to resent updated referral out to LTC for review  Private pay costs discussed:     Additional Information:  TONY received call from pt's daughter, Kaylynn that she is in agreement with vaccination and would like to move forward with it. She acknowledges that Leo is identified as the HCA and she is the first alternate. She indicates that pt calls frequently and fills up her voicemail box. Kaylynn " indicated that pt had refused the vaccination (she is confused and non decisional)  but that everyone else is in agreement with vaccination and discharge plan to LTC.      JOSE GUADALUPE Barakat  [facility]  Care Transitions  [phone]

## 2022-08-28 NOTE — PROGRESS NOTES
[facility]    Hospitalist Progress Note    Date of Service (when I saw the patient): 08/27/2022  Admit date: 7/13/2022    Interval History   Full details of events over last 24 hours outlined below.   Patient had just had BM in bed and getting help with cleaning up.   She offered no complaints.  When we discussed that we are waiting for placement in TCU once we can get approval for vaccination, she began crying at the thought she will not be going home.  We discussed she must be stronger in order to return home safely.  She shows no insight regarding her current deconditioned state and inability to be cared for at home by family.  I assured her if she shows improved strength, mobility, we discharge her home.  Discussed with social work.  We have not yet received consent from family for COVID vaccination, so that she can be admitted to nonprivate room at LT. I offered to call family but social work stated they were going to call today to explain we have no options for placement if she is not vaccinated.  Notes that Ativan was causing increased confusion.  Denies any SOB, CP, abdominal pain, N/V/D.    Assessment & Plan   Cristy aBiley is a 77 year old female with a PMHx significant for morbid obesity, hypertension, and hyperlipidemia, who presented to [facility] 7/13/2022 with complete left-sided paralysis, left-sided facial droop, and difficulty speaking. CTA head remarkable for right MCA occlusion. She was given tenecteplase and subsequently transferred to [facility] 7/13/2022 for thrombectomy.      Hospital stay complicated by flank wound which required surgical debridement and placement of wound vac and findings LTC placement.     Acute R MCA ischemic stroke with edema and right to left midline shift  S/p tenecteplase and subsequent R MCA mechanical thrombectomy 7/13/22  * Presented to [facility] 7/13 with complete paralysis, left-sided facial droop, and aphasia. Code stroke  initiated. Head CT 7/13 showed signs of an evolving R MCA infarct. CTA head 7/13 showed a right carotid terminus occlusion, right middle cerebral artery M1  segment occlusion with poor opacification of the more distal right MCA branches/poor collateral flow. CTA neck 7/13 negative for acute occlusions. Pt given tenecteplase 7/13 at 13:11. Noted to be agitated and was subsequently intubated given need for procedure.   * Subsequently transferred to [facility] 7/13/2022 where she underwent above procedure.   7/14: Extubated. Head CT 7/14 showed evolution of acute infarct involving the R MCA distribution with increased swelling, cortical effacement, and new mild right-to-left midline shift; questionable hypoattenuation involving the bilateral occipital lobes which could be artifactual.   * Additional stroke workup pursued this stay -- echo 7/14 showed EF 50%, grade 1 diastolic dysfunction   * Started ASA and atorvastatin per stroke team.   * Repeat head CT 7/15 showed evolving R MCA stroke with areas of edema, overall stable.    -- conts on full dose ASA and statin  -- goal SBP <140/90 and met.   -- 30d cardiac event monitor at discharge  -- will need to follow up with [facility] in 6-8 wks (due beginning of September)     Anxiety   Cognitive impairment, confusion as above.   * As stay has progressed, patient has consistently been oriented x2 (to self, location), unable to state the year. Also noted to have intermittent situational confusion. States family members have been visiting when they have not.   * Ongoing issues with anxiety this stay -- had been on regimen of Seroquel 12.5mg at dinner and 25mg HS with 12.5mg po q6h prn available. Mirtazapine 7.5mg on 8/15 evening given decreased appetite  * Psych consulted for assistance with ongoing mgmt -- seen on 8/16 and meds adjusted. Seroquel HS changed to Abilify 5mg HS. Recommended Zyptexa 5mg q6h prn for breakthrough insomnia/agitation while going off Seroquel. Consider  uptitation of Abilify per psych recs. Advised uptitration of sertraline (25mg x3d then increase to 50mg daily beginning 8/20) and Remeron stopped. Also advised to liberalize Ativan to 0.5mg q6h prn.      -- cont current regimen of meds including Abilify 5mg every evening, Zyprexa 2.5mg HS and sertraline 50mg daily  -- cont prns per psych including Zyprexa 5mg q6h prn (last dose given 8/25)   -- Stopped PRN ativan on 08/27/22 as leading to increased confusion. Stopped hydroxyzine as well.   -- psych consult appreciated on 8/24, increased Abilify from 5 to 10 mg.      Urinary retention, RESOLVED   * Retaining urine on 8/14. UA WNL, not worrisome for infection  * Requiring straight cath x1 on 8/20 PM  * Good UOP, no longer requiring straight cath. No evidence of obstruction on PVR.     Dysphagia due to CVA: Improved  * Seen by SLP and noted with dysphagia. Required some intermittent fluid boluses.   * Was eventually able to advance to regular diet w/thin liquids     Dyslipidemia  * FLP this stay showed tot cholest 163, HDL 47, LDL 91, .   * Started on atorvastatin 40 mg daily     Volume overload dt diastolic CHF exacerbation: Improved  Essential hypertension  Hypokalemia on lasix   * Not on/needing meds PTA.   * As above, echo this stay showed EF 50% with grade I diastolic dysfunction; RV not well visualized but appeared mild-moderately dilated with global systolic function probably mildly reduced.   * BPs were initially soft this stay and required IVFs. BPs improved.   * Developed pedal edema required IV Lasix. Ultimately transition to oral Lasix.  * Started on lisinopril this stay  -- cont lisinopril 10mg daily  -- cont Lasix BID (40 mg po in AM, 20 mg po in afternoon)  -- Started on potassium this stay. Increased potassium to 20 mg TID on 08/24/22,. In addition to replacement protocol, but missed scheduled dose on 8/25/22 and 8/24/22   -- Daily weights appreciated. Wt down on 08/24/22  -- Not consistently  tolerating potassium in packet form > changed to pills on 08/27/22  -- Recheck K on 8/29.     Vitals:    07/23/22 1102 07/28/22 1508 08/05/22 0926 08/14/22 0600   Weight: (!) 174.6 kg (385 lb) (!) 159.2 kg (350 lb 14.4 oz) (!) 155.6 kg (343 lb) (!) 153.9 kg (339 lb 3.2 oz)    08/24/22 0645   Weight: 143.8 kg (317 lb 1.6 oz)     I/O last 3 completed shifts:  In: 240 [P.O.:240]  Out: 850 [Urine:850]  Recent Labs   Lab 08/24/22  0909 08/22/22  1259   CR 0.56 0.54       Recent Labs   Lab 08/27/22  1305 08/26/22  0931 08/25/22  1435 08/24/22  2147 08/24/22  1506 08/24/22  0909 08/23/22  1637   POTASSIUM 3.4 3.3* 3.5 3.6 3.3* 3.2* 3.5   MAG 1.7 1.8 1.7  --   --  1.9 1.8       Hypophosphatemia: Resolved     Prolonged QTc   * EKG on 7/13 showed QTc 494.   * Repeat EKG on 7/30 (while on Levaquin) showed QTc table at 475. Has since completed course of abx as below.  * Had been monitoring on telemetry this stay. No concerning findings so tele was ultimately dc'd 8/19     Chronic bilateral LE edema with chronic venous stasis dermatitis and chronic skin changes  Hx of LLE cellulitis  * Was hospitalized in 11/2021 for sepsis dt to pneumonia and LLE cellulitis.   * During this stay, BLE noted to be patty and edematous, no unilateral leg swelling appreciated; LLE had patchy areas of erythema, no fluctuance, no painful areas with palpation; legs warm to touch. Lymphedema consulted.   -- cont LE elevation, lymphedema wraps     Lower back/R flank wound/abscess, suspect dt pressure type injury, s/p surgical debridement on 7/30/22 and placement of wound vac on 8/2/22  OA  Hx of bilateral hip replacements   Hx of chronic neck and back pain  * Pt with significant back pain early on in hospital stay. Was requiring IV hydromorphone.  Dose had to be decreased dt somnolence.  * MRI L-spine in 2018 showed multilevel degenerative changes with mild central stenosis. Patient has chronic back pain, reports having it over the last 2 years.   *  During this stay, patient was noted with 5-6cm by 2-3cm wound with swelling/fluid/blistering in a fold of her lower back, did not appear infected; this seemed to be the source of her pain. Padded dressing placed and WOC RN ordered. Pain initially improved.   * Was placed on course of Augmentin on 7/24.   * On 7/26, was re-evaluated by WOC RN. Wound appeared more erythematous and purulence expressed. Worsening with shear stress from lift. Procal <0.05, WBC WNL. Abx changed to IV clindamycin.  * Wound cultures obtained. On 7/28, wound grew proteus and staph aureus (MRSA neg). US neg for abscess.   * Lost IV access on 7/28 so abx were changed to oral clindamycin and oral levaquin. IV access re-established but was continued on oral abx.  * General surgery consulted, ultimately underwent excisional debridement of R flank abscess in OR on 7/30. Intraop cultures showed polymicrobial growth with proteus mirabilis x2 strains (both pan-sensitive), corynebacterium striatum and enterococcus faecalis.  * Wound vac placed per general surgery on 8/2 >> now discontinued.   * ID consulted on 8/2 and abx narrowed to Augmentin alone, completed an additional 5 days of treatment on 8/7.   -- wound per WOC RN, follows weekly, last check 8/24  -- prns available for pain  -- cont regular repositioning     Suspected meningioma left parietal region, incidentally seen on CT  * Head CT 7/13 showed a calcified extra-axial mass overlying the left parietal region measuring 1.4 cm, potentially representing a meningioma.    Will need serial monitoring OP after discharge.      Anemia, suspect dilutional component, RESOLVED  * Hgb normal on admit. Hgb 11.5 on 7/16. No overt clinical signs of major bleeding.  * Hgb now stable at 12-13     Recent Labs   Lab 08/22/22  1259   HGB 13.6       Intertriginous dermatitis  * Chronic and stable, cont clotrimazole powder     Constipation  * Continue sched bowel regimen     Morbid obesity  * BMI 59 on admission.  Recommend aggressive dietary and lifestyle modifications as condition improves     Suspected sleep apnea  O2 drop to mid 80s overnight 8/14. Briefly placed on 4LPM, which she then removed and then slept okay with sats in 90s thereafter.    recommend sleep study as outpatient     Moderate malnutrition in context of acute illness and chronic disease  * Nutritionist following. Appetite poor this stay, needing encouragement to take po.      COVID-19 vaccination status  * Not vaccinated. Family has not consented for vaccine and this is limited placement.      FEN: no IVFs, lytes stable, regular diet  DVT Prophylaxis: Lovenox 40mg subQ BID  Code Status: Full Code     Disposition: Medically stable for discharge. Discharge once LTC placement found. SW following. Per discussion with SW on 8/21, patient reportedly needs COVID vaccine prior to discharge to LTC facility (per facility policy). Hospitalist earlier this stay had previously discussed with patient's daughter Kaylynn and family was reportedly contemplating. Hospitalist called Kaylynn again on 8/21 to discuss again but no answer. Discussed with SW on 08/23/22. They have escalated this issue to higher level, and will be reaching out to family regarding the need for vaccination in order to find placement. Discussed with SW on 08/26/22. Still unable to place due to vaccination. Awaiting administration to get back to them regarding plan given family needs to agree to vaccination to be placed.   Discussed with SW on 08/27/22, offered to call family again. SW planned to call them again today and explain situation and limitation to placement without vaccine.     Clinically Significant Risk Factors Present on Admission                        Diet: Orders Placed This Encounter      Advance Diet as Tolerated      Regular Diet Adult Thin Liquids (level 0) (Upright position, alert, and assist as needed)     IVF: None   [place] Catheter: Not present     DVT Prophylaxis: Enoxaparin  (Lovenox) SQ  Code Status: Full Code     Disposition: Expected discharge once placement found.   Communication: Discussed with TONY, RN and patient on 08/27/22    Vianca Teixeira MD  Hospitalist Service  [facility]  Securely message with the Vocera Web Console (learn more here)  Text page via [facility] Paging/Director      -Data reviewed today: I reviewed all new labs and imaging results over the last 24 hours. I personally reviewed no images or EKG's today.    Physical Exam   Temp: 98.2  F (36.8  C) Temp src: Oral BP: 123/68 Pulse: 69   Resp: 18 SpO2: 94 % O2 Device: None (Room air)    Vitals:    08/05/22 0926 08/14/22 0600 08/24/22 0645   Weight: (!) 155.6 kg (343 lb) (!) 153.9 kg (339 lb 3.2 oz) 143.8 kg (317 lb 1.6 oz)     Vital Signs with Ranges  Temp:  [97.5  F (36.4  C)-98.2  F (36.8  C)] 98.2  F (36.8  C)  Pulse:  [69-87] 69  Resp:  [18] 18  BP: (113-130)/(56-72) 123/68  SpO2:  [92 %-94 %] 94 %  I/O last 3 completed shifts:  In: 240 [P.O.:240]  Out: 850 [Urine:850]    Today's Exam  Constitutional:  NAD, disheveled.   Neuropsyche:  alert and oriented to person and hospital today. Answers simple questions appropriately.   Respiratory:  Breathing comfortably, good air exchange, no wheezes, no crackles.   Cardiovascular:  Regular rate and rhythm, 1-2+ edema.  GI:  soft, NT/ND, BS normal  Skin/Integumen:  No acute rash or sign of bleeding. Venous stasis changes of the lower extremities.     Medications   All medications reviewed on 08/27/22    - MEDICATION INSTRUCTIONS -         acetaminophen  975 mg Oral Q8H     ARIPiprazole  10 mg Oral QPM     aspirin  325 mg Oral Daily     atorvastatin  40 mg Oral QPM     diclofenac  2 g Topical 4x Daily     enoxaparin ANTICOAGULANT  40 mg Subcutaneous Q12H     furosemide  20 mg Oral Daily at 4 pm     furosemide  40 mg Oral Daily     lactobacillus rhamnosus (GG)  1 capsule Oral BID     lisinopril  10 mg Oral Daily     miconazole   Topical BID      multivitamin, therapeutic  1 tablet Oral Daily     OLANZapine  2.5 mg Oral At Bedtime     polyethylene glycol  17 g Oral Daily     potassium chloride  20 mEq Oral TID     pramoxine   Topical TID     senna-docusate  1-2 tablet Oral BID     sertraline  50 mg Oral Daily     PRN Meds: sore throat lozenge, bisacodyl, calcium carbonate, hydrALAZINE, hydrOXYzine **OR** hydrOXYzine, labetalol, lidocaine 4%, lidocaine (buffered or not buffered), - MEDICATION INSTRUCTIONS -, naloxone **OR** naloxone **OR** naloxone **OR** naloxone, OLANZapine zydis, ondansetron **OR** ondansetron, oxyCODONE    Data   Recent Labs   Lab 08/27/22  1305 08/26/22  0931 08/25/22  1435 08/24/22  1506 08/24/22  0909 08/23/22  1637 08/22/22  1259   WBC  --   --   --   --   --   --  7.2   HGB  --   --   --   --   --   --  13.6   MCV  --   --   --   --   --   --  88   PLT  --   --  305  --   --   --  285   NA  --   --   --   --  141  --  141   POTASSIUM 3.4 3.3* 3.5   < > 3.2*   < > 3.5   CHLORIDE  --   --   --   --  104  --  107   CO2  --   --   --   --  31  --  26   BUN  --   --   --   --  12  --  15   CR  --   --   --   --  0.56  --  0.54   ANIONGAP  --   --   --   --  6  --  8   ASHIA  --   --   --   --  8.8  --  8.9   GLC  --   --   --   --  95  --  106*    < > = values in this interval not displayed.       No results found for this or any previous visit (from the past 24 hour(s)).

## 2022-08-28 NOTE — PLAN OF CARE
Goal Outcome Evaluation:    Plan of Care Reviewed With: patient     Overall Patient Progress: no change    Reason for Admission: R MCA ischemic stroke     Cognitive/Mentation: A/Ox person. Disoriented to situation, place, and time. Forgetful at times.  Neuros/CMS: Intact ex 4/5 bilat UE strength, 2/5 bilat LE strength. Numbness and tingling in RLE.  VS: 2x Daily. VSS.   GI: BS active, + flatus, last BM 8/28/22. Incontinent.  : Purwick in place. Incontinent.  Pulmonary: LS clear.  Pain: none.      Drains/Lines: None.  Skin: 2+ edema in legs, ankles, and feet. Peeling, cracked bilateral feet, toes. R flank wound. Miconazole powder in skin folds.   Activity: Assist x 2 with lift.   Diet: Regular with thin liquids. Takes pills whole with apple sauce.      Therapies recs: TCU      Discharge: Pending     Aggression Stoplight Tool: GREEN     End of shift summary: 30d cardiac monitor at discharge. Covid vaccination consent form signed by CHANI Bailey for discharge to TCU.

## 2022-08-28 NOTE — PLAN OF CARE
Goal Outcome Evaluation:    Patient stable overnight. Repo ~Q2hr. Minimal hallucination overnight. Behavior improved after bedtime meds.   Incontinent. Purewick in place.   R flank dressing changed this shift per WOC orders.   Discharge pending placement.

## 2022-08-28 NOTE — PLAN OF CARE
Goal Outcome Evaluation:                    Admitted on 7/13 w/ CVA  RN from 3P to 11:30P   Neuro: alert to self only, BUE 4/5 weakness, BLE 3/5 weakness  CV/Rhythm: apical pulse reg  Resp/02: stable on RA, LS clear  GI/Diet: Reg/ thin, takes pills whole in apple sauce  : uses pure wick, continent   Pulses/CMS: Palpable, intact  Activity/Falls Risk: uses lift and assist of 2-3  VS/Pain: VSS, denies pain this shift  DC Plan: plan to discharge to a TCU tomorrow   Other: Pt stable throughout these 4 hours, agitated and more confused around since 1800, PRN Zyprexa given with some relief

## 2022-08-29 NOTE — PLAN OF CARE
Goal Outcome Evaluation:    Plan of Care Reviewed With: patient     Overall Patient Progress: no change    Plan of Care Reviewed With: patient      Overall Patient Progress: no change     Reason for Admission: R MCA ischemic stroke     Cognitive/Mentation: A/Ox person. Disoriented to situation, place, and time. Forgetful at times.  Neuros/CMS: Intact ex 4/5 BUE strength, 3/5 BLE strength. Tenderness present in BLE.  VS: 2x Daily. VSS.   GI: BS active, + flatus, last BM 8/28/22. Incontinent.  : Purwick in place. Incontinent.  Pulmonary: LS clear.  Pain: none.      Drains/Lines: None.  Skin: Generalized edema. 1+ edema BUE. 2+ edema BLE. Peeling, cracked bilateral feet, toes. R flank wound. Miconazole powder in skin folds.   Activity: Assist x 2 with lift. Up to the chair x1 today.   Diet: Regular with thin liquids. Takes pills whole with apple sauce.      Therapies recs: LTC    Discharge: Pending     Aggression Stoplight Tool: GREEN     End of shift summary: 30d cardiac monitor at discharge. 1st Dose Covid vaccination completed for discharge to LTC.

## 2022-08-29 NOTE — PLAN OF CARE
[id number]  Pt here with R MCA stroke. A&O to self, confused and forgetful. Neuros are BUE 4/5, BLE weakness 3/5. VSS. Regular diet, thin liquids. Takes pills whole in applesauce. Up with A3/lift, Q2 T&Repo. Pure wick in place. Incontinent of B&B Generalized edema, LE skin scaling and flaky. Denies pain. Pt scoring yellow on the Aggression Stop Light Tool. Discharge pending for LTC. No change this shift.

## 2022-08-29 NOTE — PROGRESS NOTES
[facility]    Medicine Progress Note - Hospitalist Service    Date of Admission:  7/13/2022  Date of Service: 08/29/2022    Assessment & Plan          Full details of events over last 24 hours outlined below.   Patient had just had BM in bed and getting help with cleaning up.   She offered no complaints.  When we discussed that we are waiting for placement in TCU once we can get approval for vaccination, she began crying at the thought she will not be going home.  We discussed she must be stronger in order to return home safely.  She shows no insight regarding her current deconditioned state and inability to be cared for at home by family.  I assured her if she shows improved strength, mobility, we discharge her home.  Discussed with social work.  We have not yet received consent from family for COVID vaccination, so that she can be admitted to nonprivate room at LT. I offered to call family but social work stated they were going to call today to explain we have no options for placement if she is not vaccinated.    - 8/28- covid vacc was consented for and ordered, hopefully will open dispo options     Assessment & Plan     Cristy Bailey is a 77 year old female with a PMHx significant for morbid obesity, hypertension, and hyperlipidemia, who presented to [facility] 7/13/2022 with complete left-sided paralysis, left-sided facial droop, and difficulty speaking. CTA head remarkable for right MCA occlusion. She was given tenecteplase and subsequently transferred to [facility] 7/13/2022 for thrombectomy.      Hospital stay complicated by flank wound which required surgical debridement and placement of wound vac and findings LTC placement.     Acute R MCA ischemic stroke with edema and right to left midline shift  S/p tenecteplase and subsequent R MCA mechanical thrombectomy 7/13/22  * Presented to [facility] 7/13 with complete paralysis, left-sided facial droop, and aphasia. Code  stroke initiated. Head CT 7/13 showed signs of an evolving R MCA infarct. CTA head 7/13 showed a right carotid terminus occlusion, right middle cerebral artery M1  segment occlusion with poor opacification of the more distal right MCA branches/poor collateral flow. CTA neck 7/13 negative for acute occlusions. Pt given tenecteplase 7/13 at 13:11. Noted to be agitated and was subsequently intubated given need for procedure.   * Subsequently transferred to [facility] 7/13/2022 where she underwent above procedure.   7/14: Extubated. Head CT 7/14 showed evolution of acute infarct involving the R MCA distribution with increased swelling, cortical effacement, and new mild right-to-left midline shift; questionable hypoattenuation involving the bilateral occipital lobes which could be artifactual.   * Additional stroke workup pursued this stay -- echo 7/14 showed EF 50%, grade 1 diastolic dysfunction   * Started ASA and atorvastatin per stroke team.   * Repeat head CT 7/15 showed evolving R MCA stroke with areas of edema, overall stable.   Plan:  -- conts on full dose ASA and statin  -- goal SBP <140/90 and met.   -- 30d cardiac event monitor at discharge  -- will need to follow up with [facility] in 6-8 wks (due beginning of September)     Anxiety   Cognitive impairment, confusion as above.   * As stay has progressed, patient has consistently been oriented x2 (to self, location), unable to state the year. Also noted to have intermittent situational confusion. States family members have been visiting when they have not.   * Ongoing issues with anxiety this stay -- had been on regimen of Seroquel 12.5mg at dinner and 25mg HS with 12.5mg po q6h prn available. Mirtazapine 7.5mg on 8/15 evening given decreased appetite  * Psych consulted for assistance with ongoing mgmt -- seen on 8/16 and meds adjusted. Seroquel HS changed to Abilify 5mg HS. Recommended Zyptexa 5mg q6h prn for breakthrough insomnia/agitation while going off Seroquel.  Consider uptitation of Abilify per psych recs. Advised uptitration of sertraline (25mg x3d then increase to 50mg daily beginning 8/20) and Remeron stopped. Also advised to liberalize Ativan to 0.5mg q6h prn.       -- cont current regimen of meds including Abilify 5mg every evening, Zyprexa 2.5mg HS and sertraline 50mg daily  -- cont prns per psych including Zyprexa 5mg q6h prn (last dose given 8/25)   -- Stopped PRN ativan on 08/27/22 as leading to increased confusion. Stopped hydroxyzine as well.   -- psych consult appreciated on 8/24, increased Abilify from 5 to 10 mg.      Urinary retention, RESOLVED   * Retaining urine on 8/14. UA WNL, not worrisome for infection  * Requiring straight cath x1 on 8/20 PM  * Good UOP, no longer requiring straight cath. No evidence of obstruction on PVR.      Dysphagia due to CVA: Improved  * Seen by SLP and noted with dysphagia. Required some intermittent fluid boluses.   * Was eventually able to advance to regular diet w/thin liquids     Dyslipidemia  * FLP this stay showed tot cholest 163, HDL 47, LDL 91, .   * Started on atorvastatin 40 mg daily     Volume overload dt diastolic CHF exacerbation: Improved  Essential hypertension  Hypokalemia on lasix   * Not on/needing meds PTA.   * As above, echo this stay showed EF 50% with grade I diastolic dysfunction; RV not well visualized but appeared mild-moderately dilated with global systolic function probably mildly reduced.   * BPs were initially soft this stay and required IVFs. BPs improved.   * Developed pedal edema required IV Lasix. Ultimately transition to oral Lasix.  * Started on lisinopril this stay  -- cont lisinopril 10mg daily  -- cont Lasix BID (40 mg po in AM, 20 mg po in afternoon)  -- Started on potassium this stay. Increased potassium to 20 mg TID on 08/24/22,. In addition to replacement protocol, but missed scheduled dose on 8/25/22 and 8/24/22   -- Daily weights appreciated. Wt down on 08/24/22  -- Not  consistently tolerating potassium in packet form > changed to pills on 08/27/22  -- Recheck K on 8/29 -> wnl     Hypophosphatemia: Resolved     Prolonged QTc   * EKG on 7/13 showed QTc 494.   * Repeat EKG on 7/30 (while on Levaquin) showed QTc table at 475. Has since completed course of abx as below.  * Had been monitoring on telemetry this stay. No concerning findings so tele was ultimately dc'd 8/19     Chronic bilateral LE edema with chronic venous stasis dermatitis and chronic skin changes  Hx of LLE cellulitis  * Was hospitalized in 11/2021 for sepsis dt to pneumonia and LLE cellulitis.   * During this stay, BLE noted to be patty and edematous, no unilateral leg swelling appreciated; LLE had patchy areas of erythema, no fluctuance, no painful areas with palpation; legs warm to touch. Lymphedema consulted.   -- cont LE elevation, lymphedema wraps     Lower back/R flank wound/abscess, suspect dt pressure type injury, s/p surgical debridement on 7/30/22 and placement of wound vac on 8/2/22  OA  Hx of bilateral hip replacements   Hx of chronic neck and back pain  * Pt with significant back pain early on in hospital stay. Was requiring IV hydromorphone.  Dose had to be decreased dt somnolence.  * MRI L-spine in 2018 showed multilevel degenerative changes with mild central stenosis. Patient has chronic back pain, reports having it over the last 2 years.   * During this stay, patient was noted with 5-6cm by 2-3cm wound with swelling/fluid/blistering in a fold of her lower back, did not appear infected; this seemed to be the source of her pain. Padded dressing placed and WOC RN ordered. Pain initially improved.   * Was placed on course of Augmentin on 7/24.   * On 7/26, was re-evaluated by WOC RN. Wound appeared more erythematous and purulence expressed. Worsening with shear stress from lift. Procal <0.05, WBC WNL. Abx changed to IV clindamycin.  * Wound cultures obtained. On 7/28, wound grew proteus and staph aureus  (MRSA neg). US neg for abscess.   * Lost IV access on 7/28 so abx were changed to oral clindamycin and oral levaquin. IV access re-established but was continued on oral abx.  * General surgery consulted, ultimately underwent excisional debridement of R flank abscess in OR on 7/30. Intraop cultures showed polymicrobial growth with proteus mirabilis x2 strains (both pan-sensitive), corynebacterium striatum and enterococcus faecalis.  * Wound vac placed per general surgery on 8/2 >> now discontinued.   * ID consulted on 8/2 and abx narrowed to Augmentin alone, completed an additional 5 days of treatment on 8/7.   -- wound per WOC RN, follows weekly, last check 8/24  -- prns available for pain  -- cont regular repositioning     Suspected meningioma left parietal region, incidentally seen on CT  * Head CT 7/13 showed a calcified extra-axial mass overlying the left parietal region measuring 1.4 cm, potentially representing a meningioma.    Will need serial monitoring OP after discharge.      Anemia, suspect dilutional component, RESOLVED  * Hgb normal on admit. Hgb 11.5 on 7/16. No overt clinical signs of major bleeding.  * Hgb now stable at 12-13         Recent Labs   Lab 08/22/22  1259   HGB 13.6         Intertriginous dermatitis  * Chronic and stable, cont clotrimazole powder     Constipation  * Continue sched bowel regimen     Morbid obesity  * BMI 59 on admission. Recommend aggressive dietary and lifestyle modifications as condition improves     Suspected sleep apnea  O2 drop to mid 80s overnight 8/14. Briefly placed on 4LPM, which she then removed and then slept okay with sats in 90s thereafter.    recommend sleep study as outpatient     Moderate malnutrition in context of acute illness and chronic disease  * Nutritionist following. Appetite poor this stay, needing encouragement to take po.      COVID-19 vaccination status  * Not vaccinated. Family has not consented for vaccine and this is limited placement.            Diet: Advance Diet as Tolerated  Room Service  Regular Diet Adult Thin Liquids (level 0) (Upright position, alert, and assist as needed)    DVT Prophylaxis: Enoxaparin (Lovenox) SQ  [place] Catheter: Not present  Central Lines: None  Cardiac Monitoring: None  Code Status: Full Code      Disposition Plan     unclear - pending TCU acceptance    The patient's care was discussed with the Bedside Nurse and Patient.    Steffen Purcell MD  Hospitalist Service    Securely message with the Vocera Web Console (learn more here)  Text page via [employer] Paging/Directory         Clinically Significant Risk Factors Present on Admission                      ______________________________________________________________________    Interval History      No acute events overnight  Patient seen and examined this afternoon.  Calm, pleasant, and awake.  Denies any complaints of new pain, shortness of breath, fevers or chills.    Data reviewed today: I reviewed all medications, new labs and imaging results over the last 24 hours. I personally reviewed no images or EKG's today.    Physical Exam   Vital Signs: Temp: 97.8  F (36.6  C) Temp src: Oral BP: 130/60 Pulse: 80   Resp: 16 SpO2: 95 % O2 Device: None (Room air)    Weight: 317 lbs 1.6 oz    Gen: NAD, pleasant  HEENT: EOMI, MMM  Resp: no crackles,  no wheezes, no increased work of resp  CV: S1S2 heard, reg rhythm, reg rate  Abdo: soft, nontender, nondistended, bowel sounds present  Ext: calves nontender, well perfused  Neuro: awake and alert, CN grossly intact, no facial asymmetry      Data   Recent Labs   Lab 08/29/22  0849 08/28/22  1432 08/28/22  0727 08/26/22  0931 08/25/22  1435 08/24/22  1506 08/24/22  0909   PLT  --  276  --   --  305  --   --    NA  --   --   --   --   --   --  141   POTASSIUM 3.7 3.9 2.9*   < > 3.5   < > 3.2*   CHLORIDE  --   --   --   --   --   --  104   CO2  --   --   --   --   --   --  31   BUN  --   --   --   --   --    --  12   CR  --   --   --   --   --   --  0.56   ANIONGAP  --   --   --   --   --   --  6   ASHIA  --   --   --   --   --   --  8.8   GLC  --   --   --   --   --   --  95    < > = values in this interval not displayed.     No results found for this or any previous visit (from the past 24 hour(s)).

## 2022-08-29 NOTE — PROVIDER NOTIFICATION
Date paged: 8/29    Time paged: [phone]    Person paged: jJ    Paging system used: "[employer]    Message: Patient's feet discolored, purple color when dangling in chair. Compression stockings removed, feet elevated, bilateral pulses found in feet with Doppler. Feet still slightly discolored, do we want any imaging of the legs to rule out DVT at this time? Please advise. Thanks, Odette CHEW *[phone]    Response: Md responded, patient has history of poor venous system, particularly in legs. No overt concern for DVT, no new orders at this time, will continue to monitor.

## 2022-08-29 NOTE — PROGRESS NOTES
"Care Management Follow Up    Length of Stay (days): 47    Expected Discharge Date:       Concerns to be Addressed:       Patient plan of care discussed at interdisciplinary rounds: Yes    Anticipated Discharge Disposition: Home     Anticipated Discharge Services:    Anticipated Discharge DME:      Patient/family educated on Medicare website which has current facility and service quality ratings:    Education Provided on the Discharge Plan:    Patient/Family in Agreement with the Plan:      Referrals Placed by CM/SW:    Private pay costs discussed: Not applicable    Additional Information:  Chart review indicated that patient received her first COVID shot. Writer called [facility] to see if they would consider placement for patient since she got her first shot. Writer spoke with Odette in admissions at [facility] and she stated in order for patient to be considered \"fully vaccinated by MD she needs to have all 4 covid shots\" and they are unable to accommodate patient before she is fully vaccinated.       Addendum 1400: Voicemail received this morning from patient's daughter (Kaylynn) asking for a call back. Writer called and spoke with Kaylynn. Kaylynn reports that she is calling to see if patient has been transferred as patient was explaining she was \"strapped in and went outside\" but couldn't explain where she was. Writer affirmed patient's daughter that patient is still in the hospital and we are looking for LTC placement still and a facility has yet to be identified. Daughter reports she lives in [place] and feels that if we need to expand our search out further we should look in south, east or west area's for placement as this would be easy for patient's Significant other and daughter to get to. Writer confirmed. Patient's daughter also asked that she be updated when a facility is found so she is kept up to date on plans. Patient's daughter stated she has not heard anything from [facility]- writer stated she " would follow up with [facility]. Writer resent referrals to [facility], [facility], [facility], [facility] and [facility]. Writer also sent a referral to matheus Hua and      Addendum 1530: Call from [facility] they have to decline referral as they have no openings in their Long Term Care.     FADUMO Puckett, [facility]   Social Work   [facility]

## 2022-08-30 NOTE — PROGRESS NOTES
"[facility] Nurse Inpatient Assessment     Consulted for: Right lower back wound (Stage 3 HAPI)        Areas Assessed:      Areas visualized during today's visit: right skin fold     Wound location: Right lower back in waist crease      Last Photo: 8/30    Wound due to: Hospital Acquired Pressure injury stage 3   Stage: Unstageable 7/28, Following surgical debridement 7/30 Stage 3 Pressure Injury and Moisture Associated Skin Damage (MASD), suspect intertriginous pressure, appears to be deeper tissue damage within a crease, possible shear component from lift   Wound history/plan of care: aquacel ag and sacral mepilex   Wound base: granulation      Palpation of the wound bed: normal       Drainage: moderate (dressing not saturated)     Description of drainage: serosanguinous      Measurements (length x width x depth, in cm) 2 x 14.5 x 0.3cm     Tunneling N/A      Undermining NA  Periwound skin: necrotic tissue lifting, only one small (less than 0.5cm x 1cm)       Color: pink       Temperature: normal to warm and sweaty  Odor: none    Pain: mild with cleansing, none at rest   Pain intervention prior to dressing change: none needed   Treatment goal: Heal   STATUS: improving   Supplies ordered: at bedside       Treatment Plan:     Wound care  Start:  08/22/22 1830,   EVERY THIRD DAY,   Routine      Comments: Location: right posterior waist crease   Care: provided every 3 days by primary RN   1. Remove dressings and discard. Wet Aquacel AG to ease removal if dry still.   2. Cleanse wound and periwound skin with Vashe ( #[id number]) solution and 4x4\" gauze, pat dry   3. Apply 3M No Sting barrier wipe to periwound skin, let dry   4. Apply Dry Aquacel Ag (from unit closet) into wound bed, then cover with sacral mepilex dressing + a 4x4\" mepilex to cover whole area     Orders: Reviewed    RECOMMEND PRIMARY TEAM ORDER: None, at this time  Education provided: importance of repositioning, plan of " care and Off-loading pressure  Discussed plan of care with: Patient and Nurse  WOC nurse follow-up plan: weekly  Notify WOC if wound(s) deteriorate.  Nursing to notify the Provider(s) and re-consult the WOC Nurse if new skin concern.    DATA:     Current support surface: Bariatric Low air loss mattress    Containment of urine/stool: Incontinence Protocol and Incontinent pad in bed  BMI: Body mass index is 51.18 kg/m .   Active diet order: Orders Placed This Encounter      Advance Diet as Tolerated      Regular Diet Adult Thin Liquids (level 0) (Upright position, alert, and assist as needed)     Output: I/O last 3 completed shifts:  In: 300 [P.O.:300]  Out: 300 [Urine:300]     Labs:   No lab results found in last 7 days.    Invalid input(s): GLUCOMBO  Pressure injury risk assessment:   Sensory Perception: 3-->slightly limited  Moisture: 3-->occasionally moist  Activity: 2-->chairfast  Mobility: 2-->very limited  Nutrition: 3-->adequate  Friction and Shear: 2-->potential problem  Demetrio Score: 15    Eleanor LRN   Dept. Pager: [phone]  Dept. Office Number: [phone]

## 2022-08-30 NOTE — PLAN OF CARE
Goal Outcome Evaluation:    No acute events overnight. Neuro assessment remained unchanged, vital signs stable.    Rolando Akbar RN on 8/30/2022 at 5:08 AM

## 2022-08-30 NOTE — PROGRESS NOTES
[facility]    Medicine Progress Note - Hospitalist Service    Date of Admission:  7/13/2022  Date of Service: 08/30/2022    Assessment & Plan      Cristy Bailey is a 77 year old female with a PMHx significant for morbid obesity, hypertension, and hyperlipidemia, who presented to [facility] 7/13/2022 with complete left-sided paralysis, left-sided facial droop, and difficulty speaking. CTA head remarkable for right MCA occlusion. She was given tenecteplase and subsequently transferred to [facility] 7/13/2022 for thrombectomy.      Hospital stay complicated by flank wound which required surgical debridement and placement of wound vac and findings LTC placement.     Acute R MCA ischemic stroke with edema and right to left midline shift  S/p tenecteplase and subsequent R MCA mechanical thrombectomy 7/13/22  * Presented to [facility] 7/13 with complete paralysis, left-sided facial droop, and aphasia. Code stroke initiated. Head CT 7/13 showed signs of an evolving R MCA infarct. CTA head 7/13 showed a right carotid terminus occlusion, right middle cerebral artery M1  segment occlusion with poor opacification of the more distal right MCA branches/poor collateral flow. CTA neck 7/13 negative for acute occlusions. Pt given tenecteplase 7/13 at 13:11. Noted to be agitated and was subsequently intubated given need for procedure.   * Subsequently transferred to [facility] 7/13/2022 where she underwent above procedure.   7/14: Extubated. Head CT 7/14 showed evolution of acute infarct involving the R MCA distribution with increased swelling, cortical effacement, and new mild right-to-left midline shift; questionable hypoattenuation involving the bilateral occipital lobes which could be artifactual.   * Additional stroke workup pursued this stay -- echo 7/14 showed EF 50%, grade 1 diastolic dysfunction   * Started ASA and atorvastatin per stroke team.   * Repeat head CT 7/15 showed evolving R  MCA stroke with areas of edema, overall stable.   Plan:  -- conts on full dose ASA and statin  -- goal SBP <140/90 and met.   -- 30d cardiac event monitor at discharge  -- will need to follow up with [facility] in 6-8 wks (due beginning of September)     Anxiety   Cognitive impairment, confusion as above.   * As stay has progressed, patient has consistently been oriented x2 (to self, location), unable to state the year. Also noted to have intermittent situational confusion. States family members have been visiting when they have not.   * Ongoing issues with anxiety this stay -- had been on regimen of Seroquel 12.5mg at dinner and 25mg HS with 12.5mg po q6h prn available. Mirtazapine 7.5mg on 8/15 evening given decreased appetite  * Psych consulted for assistance with ongoing mgmt -- seen on 8/16 and meds adjusted. Seroquel HS changed to Abilify 5mg HS. Recommended Zyptexa 5mg q6h prn for breakthrough insomnia/agitation while going off Seroquel. Consider uptitation of Abilify per psych recs. Advised uptitration of sertraline (25mg x3d then increase to 50mg daily beginning 8/20) and Remeron stopped. Also advised to liberalize Ativan to 0.5mg q6h prn.       -- cont current regimen of meds including Abilify 5mg every evening, Zyprexa 2.5mg HS and sertraline 50mg daily  -- cont prns per psych including Zyprexa 5mg q6h prn (last dose given 8/25)   -- PRN ativan stopped on 08/27/22 as leading to increased confusion. Hydroxyzine stopped  as well.   -- psych consult appreciated on 8/24, increased Abilify from 5 to 10 mg.      Urinary retention, RESOLVED   * Retaining urine on 8/14. UA WNL, not worrisome for infection  * Requiring straight cath x1 on 8/20 PM  * Good UOP, no longer requiring straight cath. No evidence of obstruction on PVR.      Dysphagia due to CVA: Improved  * Seen by SLP and noted with dysphagia. Required some intermittent fluid boluses.   * Was eventually able to advance to regular diet w/thin  liquids     Dyslipidemia  * FLP this stay showed tot cholest 163, HDL 47, LDL 91, .   * Started on atorvastatin 40 mg daily     Volume overload dt diastolic CHF exacerbation: Improved  Essential hypertension  Hypokalemia on lasix   * Not on/needing meds PTA.   * As above, echo this stay showed EF 50% with grade I diastolic dysfunction; RV not well visualized but appeared mild-moderately dilated with global systolic function probably mildly reduced.   * BPs were initially soft this stay and required IVFs. BPs improved.   * Developed pedal edema required IV Lasix. Ultimately transition to oral Lasix.  * Started on lisinopril this stay  -- cont lisinopril 10mg daily  -- cont Lasix BID (40 mg po in AM, 20 mg po in afternoon)  -- Started on potassium this stay. Increased potassium to 20 mg TID on 08/24/22,. In addition to replacement protocol, but missed scheduled dose on 8/25/22 and 8/24/22   -- Daily weights appreciated. Wt down on 08/24/22  -- Not consistently tolerating potassium in packet form > changed to pills on 08/27/22  -- Recheck K on 8/29 -> wnl     Hypophosphatemia: Resolved     Prolonged QTc   * EKG on 7/13 showed QTc 494.   * Repeat EKG on 7/30 (while on Levaquin) showed QTc table at 475. Has since completed course of abx as below.  * Had been monitoring on telemetry this stay. No concerning findings so tele was ultimately dc'd 8/19     Chronic bilateral LE edema with chronic venous stasis dermatitis and chronic skin changes  Hx of LLE cellulitis  * Was hospitalized in 11/2021 for sepsis dt to pneumonia and LLE cellulitis.   * During this stay, BLE noted to be patty and edematous, no unilateral leg swelling appreciated; LLE had patchy areas of erythema, no fluctuance, no painful areas with palpation; legs warm to touch. Lymphedema consulted.   -- cont LE elevation, lymphedema wraps     Lower back/R flank wound/abscess, suspect dt pressure type injury, s/p surgical debridement on 7/30/22 and placement  of wound vac on 8/2/22  OA  Hx of bilateral hip replacements   Hx of chronic neck and back pain  * Pt with significant back pain early on in hospital stay. Was requiring IV hydromorphone.  Dose had to be decreased dt somnolence.  * MRI L-spine in 2018 showed multilevel degenerative changes with mild central stenosis. Patient has chronic back pain, reports having it over the last 2 years.   * During this stay, patient was noted with 5-6cm by 2-3cm wound with swelling/fluid/blistering in a fold of her lower back, did not appear infected; this seemed to be the source of her pain. Padded dressing placed and WOC RN ordered. Pain initially improved.   * Was placed on course of Augmentin on 7/24.   * On 7/26, was re-evaluated by WOC RN. Wound appeared more erythematous and purulence expressed. Worsening with shear stress from lift. Procal <0.05, WBC WNL. Abx changed to IV clindamycin.  * Wound cultures obtained. On 7/28, wound grew proteus and staph aureus (MRSA neg). US neg for abscess.   * Lost IV access on 7/28 so abx were changed to oral clindamycin and oral levaquin. IV access re-established but was continued on oral abx.  * General surgery consulted, ultimately underwent excisional debridement of R flank abscess in OR on 7/30. Intraop cultures showed polymicrobial growth with proteus mirabilis x2 strains (both pan-sensitive), corynebacterium striatum and enterococcus faecalis.  * Wound vac placed per general surgery on 8/2 >> now discontinued.   * ID consulted on 8/2 and abx narrowed to Augmentin alone, completed an additional 5 days of treatment on 8/7.   -- wound per WOC RN, follows weekly, last check 8/24  -- prns available for pain  -- cont regular repositioning     Suspected meningioma left parietal region, incidentally seen on CT  * Head CT 7/13 showed a calcified extra-axial mass overlying the left parietal region measuring 1.4 cm, potentially representing a meningioma.    Will need serial monitoring OP after  discharge.      Anemia, suspect dilutional component, RESOLVED  * Hgb normal on admit. Hgb 11.5 on 7/16. No overt clinical signs of major bleeding.  * Hgb now stable at 12-13         Recent Labs   Lab 08/22/22  1259   HGB 13.6         Intertriginous dermatitis  * Chronic and stable, cont clotrimazole powder     Constipation  * Continue sched bowel regimen     Morbid obesity  * BMI 59 on admission. Recommend aggressive dietary and lifestyle modifications as condition improves     Suspected sleep apnea  O2 drop to mid 80s overnight 8/14. Briefly placed on 4LPM, which she then removed and then slept okay with sats in 90s thereafter.    recommend sleep study as outpatient     Moderate malnutrition in context of acute illness and chronic disease  * Nutritionist following. Appetite poor this stay, needing encouragement to take po.      COVID-19 vaccination status  * Not vaccinated. Family has not consented for vaccine and this is limited placement.           Diet: Advance Diet as Tolerated  Room Service  Regular Diet Adult Thin Liquids (level 0) (Upright position, alert, and assist as needed)    DVT Prophylaxis: Enoxaparin (Lovenox) SQ  [place] Catheter: Not present  Central Lines: None  Cardiac Monitoring: None  Code Status: Full Code      Disposition Plan     unclear - pending TCU acceptance    The patient's care was discussed with the Bedside Nurse and Patient.    Shawn Hilario MD  Hospitalist Service  [facility]          __________________________________________________________________    Interval History      No acute events overnight   Denies any complaints of new pain, shortness of breath, fevers or chills.  .    Physical Exam   Vital Signs: Temp: 98  F (36.7  C) Temp src: Oral BP: (!) 147/67 Pulse: 85   Resp: 18 SpO2: 94 % O2 Device: None (Room air)    Weight: 317 lbs 1.6 oz    Gen: NAD, pleasant  HEENT: EOMI, MMM  Resp: no crackles,  no wheezes, no increased work of resp  CV: S1S2 heard,  reg rhythm, reg rate  Abdo: soft, nontender, nondistended, bowel sounds present  Ext: calves nontender, well perfused  Neuro: awake and alert, CN grossly intact, no facial asymmetry      Data   Recent Labs   Lab 08/29/22  0849 08/28/22  1432 08/28/22  0727 08/26/22  0931 08/25/22  1435 08/24/22  1506 08/24/22  0909   PLT  --  276  --   --  305  --   --    NA  --   --   --   --   --   --  141   POTASSIUM 3.7 3.9 2.9*   < > 3.5   < > 3.2*   CHLORIDE  --   --   --   --   --   --  104   CO2  --   --   --   --   --   --  31   BUN  --   --   --   --   --   --  12   CR  --   --   --   --   --   --  0.56   ANIONGAP  --   --   --   --   --   --  6   ASHIA  --   --   --   --   --   --  8.8   GLC  --   --   --   --   --   --  95    < > = values in this interval not displayed.     No results found for this or any previous visit (from the past 24 hour(s)).

## 2022-08-30 NOTE — PLAN OF CARE
Alert and oriented x2, disoriented to time and place. Anxious at times. VSS on RA except soft BP. Held Lasix and Lisinopril per parameters.  Denies chest pain and SOB. Neuro intact except confusion and generalized weakness. Tolerating reg diet with thin liquid, poor appetite. Takes pills whole with applesauce. Wound care to R flank/back completed per plan of care. BLE and bilateral groin wound care completed. Edema wear to BLE. Incontinent of urine, using purewick. Turn and repo done. Up in chair for couple hours. Discharge pending placement.

## 2022-08-30 NOTE — PLAN OF CARE
Reason for Admission: R MCA CVA    Cognitive/Mentation: A/Ox self, forgetful and confused  Neuros/CMS: Intact ex 4/5 BUE, 3/5 BLE  VS: Stable.  GI: BS +, + flatus, last BM yesterday. Incontinent.  : Purewick, incontinent.  Pulmonary: LS clear.  Pain: Pain controlled with readjustment, patient refused scheduled tylenol.     Drains: None  Skin: Wound to lower back, scaling/peeling skin to shins and feet. Compression stockings on legs. Scattered bruising  Activity: Assist x 3-4 with lift.  Diet: Regular with thin liquids. Takes pills whole.     Aggression Stoplight Score: Yellow for confusion  Therapies recs: LTACH  Discharge: Pending placement and full COVID vaccination status    End of shift summary: Patient refusing select medications, documented in MAR. Patient's feet became discolored and purple while dangling in chair, pulses present bilaterally via Doppler. MD  Notified, no new orders or concerns.

## 2022-08-31 NOTE — PLAN OF CARE
Goal Outcome Evaluation:          Overall Patient Progress: no change    Outcome Evaluation: regular diet, RSWA. continues to have poor appetite and PO intake of <50% throughout admit x49 days. continues to be confused and agitated.    Donna Ho RD, LD

## 2022-08-31 NOTE — PROGRESS NOTES
[facility]    Medicine Progress Note - Hospitalist Service    Date of Admission:  7/13/2022  Date of Service: 08/31/2022    Assessment & Plan      Cristy Bailey is a 77 year old female with a PMHx significant for morbid obesity, hypertension, and hyperlipidemia, who presented to [facility] 7/13/2022 with complete left-sided paralysis, left-sided facial droop, and difficulty speaking. CTA head remarkable for right MCA occlusion. She was given tenecteplase and subsequently transferred to [facility] 7/13/2022 for thrombectomy.      Hospital stay complicated by flank wound which required surgical debridement and placement of wound vac and findings LTC placement.     Acute R MCA ischemic stroke with edema and right to left midline shift  S/p tenecteplase and subsequent R MCA mechanical thrombectomy 7/13/22  * Presented to [facility] 7/13 with complete paralysis, left-sided facial droop, and aphasia. Code stroke initiated. Head CT 7/13 showed signs of an evolving R MCA infarct. CTA head 7/13 showed a right carotid terminus occlusion, right middle cerebral artery M1  segment occlusion with poor opacification of the more distal right MCA branches/poor collateral flow. CTA neck 7/13 negative for acute occlusions. Pt given tenecteplase 7/13 at 13:11. Noted to be agitated and was subsequently intubated given need for procedure.   * Subsequently transferred to [facility] 7/13/2022 where she underwent above procedure.   7/14: Extubated. Head CT 7/14 showed evolution of acute infarct involving the R MCA distribution with increased swelling, cortical effacement, and new mild right-to-left midline shift; questionable hypoattenuation involving the bilateral occipital lobes which could be artifactual.   * Additional stroke workup pursued this stay -- echo 7/14 showed EF 50%, grade 1 diastolic dysfunction   * Started ASA and atorvastatin per stroke team.   * Repeat head CT 7/15 showed evolving R  MCA stroke with areas of edema, overall stable.   Plan:  -- conts on full dose ASA and statin  -- goal SBP <140/90 and met.   -- 30d cardiac event monitor at discharge  -- will need to follow up with [facility] in 6-8 wks (due beginning of September)     Anxiety   Cognitive impairment, confusion as above.   * As stay has progressed, patient has consistently been oriented x2 (to self, location), unable to state the year. Also noted to have intermittent situational confusion. States family members have been visiting when they have not.   * Ongoing issues with anxiety this stay -- had been on regimen of Seroquel 12.5mg at dinner and 25mg HS with 12.5mg po q6h prn available. Mirtazapine 7.5mg on 8/15 evening given decreased appetite  * Psych consulted for assistance with ongoing mgmt -- seen on 8/16 and meds adjusted. Seroquel HS changed to Abilify 5mg HS. Recommended Zyptexa 5mg q6h prn for breakthrough insomnia/agitation while going off Seroquel. Consider uptitation of Abilify per psych recs. Advised uptitration of sertraline (25mg x3d then increase to 50mg daily beginning 8/20) and Remeron stopped. Also advised to liberalize Ativan to 0.5mg q6h prn.    -- cont current regimen of meds including Abilify 5mg every evening, Zyprexa 2.5mg HS and sertraline 50mg daily  -- cont prns per psych including Zyprexa 5mg q6h prn (last dose given 8/25)   -- PRN ativan stopped on 08/27/22 as leading to increased confusion. Hydroxyzine stopped  as well.   --psych consult appreciated on 8/24, increased Abilify from 5 to 10 mg.      Urinary retention, RESOLVED   * Retaining urine on 8/14. UA WNL, not worrisome for infection  * Requiring straight cath x1 on 8/20 PM  * Good UOP, no longer requiring straight cath. No evidence of obstruction on PVR.      Dysphagia due to CVA: Improved  * Seen by SLP and noted with dysphagia. Required some intermittent fluid boluses.   * Was eventually able to advance to regular diet w/thin  liquids     Dyslipidemia  * FLP this stay showed tot cholest 163, HDL 47, LDL 91, .   * Started on atorvastatin 40 mg daily     Volume overload dt diastolic CHF exacerbation: Improved  Essential hypertension  Hypokalemia on lasix   * Not on/needing meds PTA.   * As above, echo this stay showed EF 50% with grade I diastolic dysfunction; RV not well visualized but appeared mild-moderately dilated with global systolic function probably mildly reduced.   * BPs were initially soft this stay and required IVFs. BPs improved.   * Developed pedal edema required IV Lasix. Ultimately transition to oral Lasix.  * Started on lisinopril this stay  -- cont lisinopril 10mg daily  -- cont Lasix BID (40 mg po in AM, 20 mg po in afternoon)  -- Monitor potassium, cr levels.     Hypophosphatemia: Resolved     Prolonged QTc   * EKG on 7/13 showed QTc 494.   * Repeat EKG on 7/30 (while on Levaquin) showed QTc table at 475. Has since completed course of abx as below.  * Had been monitoring on telemetry this stay. No concerning findings so tele was ultimately dc'd 8/19     Chronic bilateral LE edema with chronic venous stasis dermatitis and chronic skin changes  Hx of LLE cellulitis  * Was hospitalized in 11/2021 for sepsis dt to pneumonia and LLE cellulitis.   * During this stay, BLE noted to be patty and edematous, no unilateral leg swelling appreciated; LLE had patchy areas of erythema, no fluctuance, no painful areas with palpation; legs warm to touch. Lymphedema consulted.   -- cont LE elevation, lymphedema wraps     Lower back/R flank wound/abscess, suspect dt pressure type injury, s/p surgical debridement on 7/30/22 and placement of wound vac on 8/2/22  OA  Hx of bilateral hip replacements   Hx of chronic neck and back pain  * Pt with significant back pain early on in hospital stay. Was requiring IV hydromorphone.  Dose had to be decreased dt somnolence.  * MRI L-spine in 2018 showed multilevel degenerative changes with mild  central stenosis. Patient has chronic back pain, reports having it over the last 2 years.   * During this stay, patient was noted with 5-6cm by 2-3cm wound with swelling/fluid/blistering in a fold of her lower back, did not appear infected; this seemed to be the source of her pain. Padded dressing placed and WOC RN ordered. Pain initially improved.   * Was placed on course of Augmentin on 7/24.   * On 7/26, was re-evaluated by WOC RN. Wound appeared more erythematous and purulence expressed. Worsening with shear stress from lift. Procal <0.05, WBC WNL. Abx changed to IV clindamycin.  * Wound cultures obtained. On 7/28, wound grew proteus and staph aureus (MRSA neg). US neg for abscess.   * Lost IV access on 7/28 so abx were changed to oral clindamycin and oral levaquin. IV access re-established but was continued on oral abx.  * General surgery consulted, ultimately underwent excisional debridement of R flank abscess in OR on 7/30. Intraop cultures showed polymicrobial growth with proteus mirabilis x2 strains (both pan-sensitive), corynebacterium striatum and enterococcus faecalis.  * Wound vac placed per general surgery on 8/2 >> now discontinued.   * ID consulted on 8/2 and abx narrowed to Augmentin alone, completed an additional 5 days of treatment on 8/7.   -- wound per WOC RN, follows weekly, last check 8/24  -- prns available for pain  -- cont regular repositioning     Suspected meningioma left parietal region, incidentally seen on CT  * Head CT 7/13 showed a calcified extra-axial mass overlying the left parietal region measuring 1.4 cm, potentially representing a meningioma.    Will need serial monitoring OP after discharge.      Anemia, suspect dilutional component, RESOLVED  * Hgb normal on admit. Hgb 11.5 on 7/16. No overt clinical signs of major bleeding.  * Hgb now stable at 12-13         Recent Labs   Lab 08/22/22  1259   HGB 13.6         Intertriginous dermatitis  * Chronic and stable, cont clotrimazole  powder     Constipation  * Continue sched bowel regimen     Morbid obesity  * BMI 59 on admission. Recommend aggressive dietary and lifestyle modifications as condition improves     Suspected sleep apnea  O2 drop to mid 80s overnight 8/14. Briefly placed on 4LPM, which she then removed and then slept okay with sats in 90s thereafter.    recommend sleep study as outpatient     Moderate malnutrition in context of acute illness and chronic disease  * Nutritionist following. Appetite poor this stay, needing encouragement to take po.      COVID-19 vaccination status  Received first dose of vaccine.          Diet: Advance Diet as Tolerated  Room Service  Regular Diet Adult Thin Liquids (level 0) (Upright position, alert, and assist as needed)    DVT Prophylaxis: Enoxaparin (Lovenox) SQ  C[place]quillo Catheter: Not present  Central Lines: None  Cardiac Monitoring: None  Code Status: Full Code      Disposition Plan     unclear - pending TCU acceptance    The patient's care was discussed with the Bedside Nurse and Patient.    Shawn Hilario MD  Hospitalist Service  [facility]          __________________________________________________________________    Interval History    No acute events overnight. Denies cp/sob/abd pain/n/v.   No f/c.  .    Physical Exam   Vital Signs: Temp: 97.7  F (36.5  C) Temp src: Oral BP: 124/64 Pulse: 73   Resp: 16 SpO2: 92 % O2 Device: None (Room air)    Weight: 317 lbs 1.6 oz    Gen: NAD, pleasant  HEENT: EOMI, MMM  Resp: no crackles,  no wheezes, no increased work of resp  CV: S1S2 heard, reg rhythm, reg rate  Abdo: soft, nontender, nondistended, bowel sounds present  Ext: calves nontender, well perfused  Neuro: awake and alert, CN grossly intact, no facial asymmetry      Data   Recent Labs   Lab 08/30/22  1822 08/29/22  0849 08/28/22  1432 08/26/22  0931 08/25/22  1435 08/24/22  1506 08/24/22  0909   PLT  --   --  276  --  305  --   --    NA  --   --   --   --   --   --  141    POTASSIUM 3.4 3.7 3.9   < > 3.5   < > 3.2*   CHLORIDE  --   --   --   --   --   --  104   CO2  --   --   --   --   --   --  31   BUN  --   --   --   --   --   --  12   CR  --   --   --   --   --   --  0.56   ANIONGAP  --   --   --   --   --   --  6   ASHIA  --   --   --   --   --   --  8.8   GLC  --   --   --   --   --   --  95    < > = values in this interval not displayed.     No results found for this or any previous visit (from the past 24 hour(s)).

## 2022-08-31 NOTE — PLAN OF CARE
Goal Outcome Evaluation:    Plan of Care Reviewed With: patient     Overall Patient Progress: no change    Reason for Admission: R MCA ischemic stroke     Cognitive/Mentation: A/Ox person. Disoriented to situation, place, and time. Forgetful at times. Very angry, anxious, restless and uncooperative. Yelling out and crying that she wants to go home and someone stole her ipad.   Neuros/CMS: Intact ex 4/5 BUE strength, 3/5 BLE strength. Tenderness present in BLE.  VS: 2x Daily. VSS.   GI: BS active, + flatus, last BM 8/28/22. Incontinent.  : Purwick in place. Incontinent.  Pulmonary: LS clear.  Pain: none.      Drains/Lines: None.  Skin: Generalized edema. 2+ edema BUE/BLE. Peeling, cracked bilateral feet, toes. R flank wound. Miconazole powder in skin folds.   Activity: Assist x 2-3 with lift.    Diet: Regular with thin liquids. Takes pills whole with apple sauce.      Therapies recs: LTC    Discharge: Pending     Aggression Stoplight Tool: GREEN     End of shift summary: No changes this shift. Refused multiple medications stating they are to big for her to swallow and she can't take them.

## 2022-08-31 NOTE — PLAN OF CARE
Goal Outcome Evaluation:    A&O to self. Neuros intact except for bilateral generalized weakness. VSS. Regular diet, thin liquids, only eating couple of bites from meal tray. Takes pills whole with apple sauce, bigger pills given crushed. Up to chair with lift this morning, worked with PT. Schedule tylenol given for lower leg pain. Left groin and pannus with fissure wound. R flank dressing clean, dry and intact. Pt scoring green on the Aggression Stop Light Tool.

## 2022-08-31 NOTE — PLAN OF CARE
Goal Outcome Evaluation:    Plan of Care Reviewed With: patient     Overall Patient Progress: no change         Admitted on 7/13 w/ CVA  Neuro: alert to self only, BUE 4/5 weakness, BLE 3/5 weakness  CV/Rhythm: apical pulse reg  Resp/02: stable on RA, LS clear  GI/Diet: Reg/ thin, takes pills whole in apple sauce, large pills must be crushed  : uses pure wick, Incontinent   Pulses/CMS: Palpable, intact  Activity/Falls Risk: uses lift and assist of 2-3  VS/Pain: VSS, denies pain this shift  DC Plan: plan to discharge to a TCU tomorrow   Other: Pt stable, agitated and more confused around 0300, PRN Zyprexa given with some relief

## 2022-08-31 NOTE — PROGRESS NOTES
CLINICAL NUTRITION SERVICES - REASSESSMENT NOTE      RECOMMENDATIONS FOR MD/PROVIDER TO ORDER:   - consider nutrition support if within GOC, pt continues to have poor PO intake and we have tried every supplement available --> pt will not take any   Recommendations Ordered by Registered Dietitian (RD):   - modified thera-vit to thera-vit-m for wound healing with continued poor PO intake  - ordered updated wt   Malnutrition:   % Weight Loss:  > 5% in 1 month (severe malnutrition) - significant loss over this admit  % Intake:  </= 50% for >/= 5 days (severe malnutrition) - consistent this admit x49 days  Subcutaneous Fat Loss:  Orbital region moderate depletion - visual, per 8/16 note  Muscle Loss:  Temporal region moderate depletion - visual, per 8/16 note  Fluid Retention:  Mild generalized edema    Malnutrition Diagnosis: Severe malnutrition  In Context of:  Acute illness or injury  Chronic illness or disease       EVALUATION OF PROGRESS TOWARD GOALS   Diet: regular  - RSWA    Intake/Tolerance:   - chart reviewed today as pt continues to be disoriented and agitated   - per nursing flow sheet, 0-50% intakes over past week  - per health touch, pt receiving 0-2 meals/day over past week  - per chart review, pt continues to have a poor appetite    Barriers: mentation, appetite    ASSESSED NUTRITION NEEDS:  Dosing Weight: 89.7 kg (adjusted)  Estimated Energy Needs: 6795-9663 kcals (15-20 Kcal/Kg)  Justification: maintenance r/t BMI  Estimated Protein Needs: 108-135 grams protein (1.2-1.5 g pro/Kg)  Justification: wound healing, obesity guidelines, preservation of lean body mass and increased needs r/t age  Estimated Fluid Needs: (1 mL/Kcal)  Justification: maintenance and per provider pending fluid status      NEW FINDINGS:   General:   - pt continues to be medically stable for discharge, awaiting TCU placement pending covid vax (given 8/28)  - pt continues to be alert to self only, restless and agitated, intermittently  hallucinating     Weight: last wt 8/24 --> ordered updated wt    Medications: lasix, culturell, thera-vit, miralax, potassium chloride tablet, senna-docusate    GI:  - last BM x6 on 8/27  - per chart review, pt has been having loose BMs recently    Skin: WOC following, 8/30  Wound location: Right lower back in waist crease    Wound due to: Hospital Acquired Pressure injury stage 3   Stage: Unstageable 7/28, Following surgical debridement 7/30 Stage 3 Pressure Injury and Moisture Associated Skin Damage (MASD), suspect intertriginous pressure, appears to be deeper tissue damage within a crease, possible shear component from lift       Previous Goals:   Pt to consistently consume >50% meals and 100% of the supplements  Evaluation: Not met    Previous Nutrition Diagnosis:   Increased nutrient needs (protein) related to higher demand for healing as evidenced by pt with noted stage 3 PI  Evaluation: No change      MALNUTRITION  % Weight Loss:  > 5% in 1 month (severe malnutrition) - significant loss over this admit  % Intake:  </= 50% for >/= 5 days (severe malnutrition) - consistent this admit x49 days  Subcutaneous Fat Loss:  Orbital region moderate depletion - visual, per 8/16 note  Muscle Loss:  Temporal region moderate depletion - visual, per 8/16 note  Fluid Retention:  Mild generalized edema    Malnutrition Diagnosis: Severe malnutrition  In Context of:  Acute illness or injury  Chronic illness or disease    CURRENT NUTRITION DIAGNOSIS  Increased nutrient needs (protein) related to higher demand for healing as evidenced by pt with noted stage 3 PI    INTERVENTIONS  Recommendations / Nutrition Prescription  - modified thera-vit to thera-vit-m for wound healing with continued poor PO intake  - ordered updated wt    Implementation  Multivitamin/Mineral    Goals  Patient to consume >50% of nutritionally adequate meals TID over next 5-7 days      MONITORING AND EVALUATION:  Progress towards goals will be monitored and  evaluated per protocol and Practice Guidelines      Donna Ho RD, LD

## 2022-09-01 NOTE — PROGRESS NOTES
[id number]    Alert to self. VSS on RA.  Bilateral weakness and edema. Refuses some PO medications.  A2 w/ lift. Purewick. Discharge to TCU pending.

## 2022-09-01 NOTE — PLAN OF CARE
"Reason for Admission: R MCA CVA    Cognitive/Mentation: A/Ox self  Neuros/CMS: confused/anxious, calling out.  GI: inContinent.  : purewick. Continent.  Pulmonary: LS clear.  Pain: generalized, \"everything hurts\"     Skin: wound to lower back. BLE peeling/dry. Compression stockings on.   Activity: Assist x 2-3 with lift.  Diet: reg with thin liquids. Takes pills whole in applesauce.     Therapies recs: LTC  Discharge: placement    End of shift summary: pt slept well most of the night. No changes this shift.       "

## 2022-09-01 NOTE — PLAN OF CARE
Goal Outcome Evaluation:      Alert to self. VSS on RA. Neuros intact. Bilateral weakness and edema. Takes pills whole w/ water if small- if bigger likes crushed in apple sauce. A2 w/ lift. Purewick. DIC TCU pending

## 2022-09-01 NOTE — PROGRESS NOTES
[facility]    Hospitalist Progress Note    Date of Service (when I saw the patient): 09/01/2022  Admit date: 7/13/2022    Interval History   Full details of events over last 24 hours outlined below.   Patient sleeping. Discussed with RN, no new issues.   No evidence of SOB, CP, abdominal pain, N/V/D.    Assessment & Plan   Cristy Bailey is a 77 year old female with a PMHx significant for morbid obesity, hypertension, and hyperlipidemia, who presented to [facility] 7/13/2022 with complete left-sided paralysis, left-sided facial droop, and difficulty speaking. CTA head remarkable for right MCA occlusion. She was given tenecteplase and subsequently transferred to [facility] 7/13/2022 for thrombectomy.      Hospital stay complicated by flank wound which required surgical debridement and placement of wound vac and findings LTC placement.     Acute R MCA ischemic stroke with edema and right to left midline shift  S/p tenecteplase and subsequent R MCA mechanical thrombectomy 7/13/22  * Presented to [facility] 7/13 with complete paralysis, left-sided facial droop, and aphasia. Code stroke initiated. Head CT 7/13 showed signs of an evolving R MCA infarct. CTA head 7/13 showed a right carotid terminus occlusion, right middle cerebral artery M1  segment occlusion with poor opacification of the more distal right MCA branches/poor collateral flow. CTA neck 7/13 negative for acute occlusions. Pt given tenecteplase 7/13 at 13:11. Noted to be agitated and was subsequently intubated given need for procedure.   * Subsequently transferred to [facility] 7/13/2022 where she underwent above procedure.   7/14: Extubated. Head CT 7/14 showed evolution of acute infarct involving the R MCA distribution with increased swelling, cortical effacement, and new mild right-to-left midline shift; questionable hypoattenuation involving the bilateral occipital lobes which could be artifactual.   * Additional stroke  workup pursued this stay -- echo 7/14 showed EF 50%, grade 1 diastolic dysfunction   * Started ASA and atorvastatin per stroke team.   * Repeat head CT 7/15 showed evolving R MCA stroke with areas of edema, overall stable.     -- conts on full dose ASA and statin  -- goal SBP <140/90 and met.   -- 30d cardiac event monitor at discharge  -- will need to follow up with [facility] in 6-8 wks (due beginning of September)     Anxiety   Cognitive impairment, confusion as above.   * As stay has progressed, patient has consistently been oriented x2 (to self, location), unable to state the year. Also noted to have intermittent situational confusion. States family members have been visiting when they have not.   * Ongoing issues with anxiety this stay -- had been on regimen of Seroquel 12.5mg at dinner and 25mg HS with 12.5mg po q6h prn available. Mirtazapine 7.5mg on 8/15 evening given decreased appetite  * Psych consulted for assistance with ongoing mgmt -- seen on 8/16 and meds adjusted. Seroquel HS changed to Abilify 5mg HS. Recommended Zyptexa 5mg q6h prn for breakthrough insomnia/agitation while going off Seroquel. Consider uptitation of Abilify per psych recs. Advised uptitration of sertraline (25mg x3d then increase to 50mg daily beginning 8/20) and Remeron stopped. Also advised to liberalize Ativan to 0.5mg q6h prn.    * PRN ativan stopped on 08/27/22 as leading to increased confusion. Hydroxyzine stopped  as well.     -- cont current regimen of meds including Abilify 5mg every evening, Zyprexa 2.5mg HS and sertraline 50mg daily  -- cont prns per psych including Zyprexa 5mg q6h prn (last dose given 8/31)   -- psych consult appreciated on 8/24, increased Abilify from 5 to 10 mg.      Urinary retention, RESOLVED   * Retaining urine on 8/14. UA WNL, not worrisome for infection  * Requiring straight cath x1 on 8/20 PM  * Good UOP, no longer requiring straight cath. No evidence of obstruction on PVR.      Dysphagia due to CVA:  Improved  * Seen by SLP and noted with dysphagia. Required some intermittent fluid boluses.   * Was eventually able to advance to regular diet w/thin liquids     Dyslipidemia  * FLP this stay showed tot cholest 163, HDL 47, LDL 91, .   * Started on atorvastatin 40 mg daily     Volume overload dt diastolic CHF exacerbation: Improved  Essential hypertension  Hypokalemia on lasix   * Not on/needing meds PTA.   * As above, echo this stay showed EF 50% with grade I diastolic dysfunction; RV not well visualized but appeared mild-moderately dilated with global systolic function probably mildly reduced.   * BPs were initially soft this stay and required IVFs. BPs improved.   * Developed pedal edema required IV Lasix. Ultimately transition to oral Lasix.  * Started on lisinopril this stay  -- cont lisinopril 10mg daily  -- cont Lasix BID (40 mg po in AM, 20 mg po in afternoon) + potassium 20 meq TID  -- Cr and K stable on above doses.     Recent Labs   Lab 08/31/22  0938 08/30/22  1822 08/29/22  0849 08/28/22  1432 08/28/22  0727 08/27/22  1305 08/26/22  0931   POTASSIUM 3.5 3.4 3.7 3.9 2.9* 3.4 3.3*   MAG 1.8 1.7 1.6  --  1.7 1.7 1.8     Recent Labs   Lab 09/01/22  1051   CR 0.51*          Hypophosphatemia: Resolved     Prolonged QTc   * EKG on 7/13 showed QTc 494.   * Repeat EKG on 7/30 (while on Levaquin) showed QTc table at 475. Has since completed course of abx as below.  * Had been monitoring on telemetry this stay. No concerning findings so tele was ultimately dc'd 8/19     Chronic bilateral LE edema with chronic venous stasis dermatitis and chronic skin changes  Hx of LLE cellulitis  * Was hospitalized in 11/2021 for sepsis dt to pneumonia and LLE cellulitis.   * During this stay, BLE noted to be patty and edematous, no unilateral leg swelling appreciated; LLE had patchy areas of erythema, no fluctuance, no painful areas with palpation; legs warm to touch. Lymphedema consulted.   -- cont LE elevation,  lymphedema wraps     Lower back/R flank wound/abscess, suspect dt pressure type injury, s/p surgical debridement on 7/30/22 and placement of wound vac on 8/2/22  OA  Hx of bilateral hip replacements   Hx of chronic neck and back pain  * Pt with significant back pain early on in hospital stay. Was requiring IV hydromorphone.  Dose had to be decreased dt somnolence.  * MRI L-spine in 2018 showed multilevel degenerative changes with mild central stenosis. Patient has chronic back pain, reports having it over the last 2 years.   * During this stay, patient was noted with 5-6cm by 2-3cm wound with swelling/fluid/blistering in a fold of her lower back, did not appear infected; this seemed to be the source of her pain. Padded dressing placed and WOC RN ordered. Pain initially improved.   * Was placed on course of Augmentin on 7/24.   * On 7/26, was re-evaluated by WOC RN. Wound appeared more erythematous and purulence expressed. Worsening with shear stress from lift. Procal <0.05, WBC WNL. Abx changed to IV clindamycin.  * Wound cultures obtained. On 7/28, wound grew proteus and staph aureus (MRSA neg). US neg for abscess.   * Lost IV access on 7/28 so abx were changed to oral clindamycin and oral levaquin. IV access re-established but was continued on oral abx.  * General surgery consulted, ultimately underwent excisional debridement of R flank abscess in OR on 7/30. Intraop cultures showed polymicrobial growth with proteus mirabilis x2 strains (both pan-sensitive), corynebacterium striatum and enterococcus faecalis.  * Wound vac placed per general surgery on 8/2 >> now discontinued.   * ID consulted on 8/2 and abx narrowed to Augmentin alone, completed an additional 5 days of treatment on 8/7.   -- wound per WOC RN, follows weekly, last check 8/24  -- prns available for pain  -- cont regular repositioning     Suspected meningioma left parietal region, incidentally seen on CT  * Head CT 7/13 showed a calcified extra-axial  mass overlying the left parietal region measuring 1.4 cm, potentially representing a meningioma.    Will need serial monitoring OP after discharge.      Anemia, suspect dilutional component, RESOLVED  * Hgb normal on admit. Hgb 11.5 on 7/16. No overt clinical signs of major bleeding.  * Hgb now stable at 12-13     Recent Labs   Lab 08/22/22  1259   HGB 13.6         Intertriginous dermatitis  * Chronic and stable, cont clotrimazole powder     Constipation  * Continue sched bowel regimen     Morbid obesity  * BMI 59 on admission. Recommend aggressive dietary and lifestyle modifications as condition improves     Suspected sleep apnea  O2 drop to mid 80s overnight 8/14. Briefly placed on 4LPM, which she then removed and then slept okay with sats in 90s thereafter.    recommend sleep study as outpatient     Moderate malnutrition in context of acute illness and chronic disease  * Nutritionist following. Appetite poor this stay, needing encouragement to take po.      COVID-19 vaccination status  Received first dose of vaccine on 8/28.     Clinically Significant Risk Factors Present on Admission                        Diet: Orders Placed This Encounter      Advance Diet as Tolerated      Regular Diet Adult Thin Liquids (level 0) (Upright position, alert, and assist as needed)     IVF: None   [place] Catheter: Not present     DVT Prophylaxis: Enoxaparin (Lovenox) SQ  Code Status: Full Code     Disposition: Expected discharge when placement found. Medically stable.   Communication: Discussed with RN on 09/01/22    iVanca Teixeira MD  Hospitalist Service  [facility]  Securely message with the Vocera Web Console (learn more here)  Text page via [employer] Paging/Directory    -Data reviewed today: I reviewed all new labs and imaging results over the last 24 hours. I personally reviewed no images or EKG's today.    Physical Exam   Temp: 97.9  F (36.6  C) Temp src: Oral BP: 114/55 Pulse: 76   Resp: 18 SpO2: 91  % O2 Device: None (Room air)    Vitals:    08/14/22 0600 08/24/22 0645 08/31/22 1400   Weight: (!) 153.9 kg (339 lb 3.2 oz) 143.8 kg (317 lb 1.6 oz) 140.8 kg (310 lb 6.5 oz)     Vital Signs with Ranges  Temp:  [97.9  F (36.6  C)] 97.9  F (36.6  C)  Pulse:  [76-80] 76  Resp:  [18] 18  BP: (105-114)/(55-58) 114/55  SpO2:  [91 %-93 %] 91 %  I/O last 3 completed shifts:  In: -   Out: 450 [Urine:450]    Today's Exam  Constitutional:  NAD,   Neuropsyche: sleeping  Respiratory:  Breathing comfortably, good air exchange, no wheezes, no crackles.   Cardiovascular:  Regular rate and rhythm,1-2+ edema.  GI:  soft, NT/ND, BS normal  Skin/Integumen:  No acute rash or sign of bleeding.     Medications   All medications reviewed on 09/01/22     - MEDICATION INSTRUCTIONS -         acetaminophen  975 mg Oral Q8H     ARIPiprazole  10 mg Oral QPM     aspirin  325 mg Oral Daily     atorvastatin  40 mg Oral QPM     COVID-19 mRNA vacc (PFIZER)  30 mcg Intramuscular Q21 Days     diclofenac  2 g Topical 4x Daily     enoxaparin ANTICOAGULANT  40 mg Subcutaneous Q12H     furosemide  20 mg Oral Daily at 4 pm     furosemide  40 mg Oral Daily     lactobacillus rhamnosus (GG)  1 capsule Oral BID     lisinopril  10 mg Oral Daily     miconazole   Topical BID     multivitamin w/minerals  1 tablet Oral Daily     OLANZapine  2.5 mg Oral At Bedtime     polyethylene glycol  17 g Oral Daily     potassium chloride  20 mEq Oral TID     pramoxine   Topical TID     senna-docusate  1-2 tablet Oral BID     sertraline  50 mg Oral Daily     PRN Meds: sore throat, bisacodyl, calcium carbonate, diphenhydrAMINE **OR** diphenhydrAMINE, EPINEPHrine, hydrALAZINE, labetalol, lidocaine 4%, lidocaine (buffered or not buffered), - MEDICATION INSTRUCTIONS -, naloxone **OR** naloxone **OR** naloxone **OR** naloxone, OLANZapine zydis, ondansetron **OR** ondansetron, oxyCODONE    Data   Recent Labs   Lab 09/01/22  1051 08/31/22  1425 08/31/22  0938 08/30/22  1822  08/29/22  0849 08/28/22  1432    241  --   --   --  276   POTASSIUM  --   --  3.5 3.4 3.7 3.9   CR 0.51*  --   --   --   --   --        No results found for this or any previous visit (from the past 24 hour(s)).

## 2022-09-01 NOTE — PLAN OF CARE
"Goal Outcome Evaluation:    0700-1930    Reason for Admission: R MCA CVA     Cognitive/Mentation: A/Ox self  Neuros/CMS: confused/anxious, calling out.  GI: Incontinent.  : Purwick. Incontinent.  Pulmonary: LS clear.  Pain: generalized, \"everything hurts\"      Skin: Woundcare performed to lower back. BLE peeling/dry. Compression stockings on.   Activity: Assist x 2-3 with lift.  Diet: Reg with thin liquids. Takes pills whole in applesauce.      Therapies recs: LTC  Discharge: placement     End of shift summary: Sat up in chair for part of shift.  No IV access.                      "

## 2022-09-02 NOTE — PROGRESS NOTES
"[facility]    Hospitalist Progress Note    Date of Service (when I saw the patient): 09/02/2022  Admit date: 7/13/2022    Interval History   Full details of events over last 24 hours outlined below.   \"Thanks for coming down to see me.\"   \"I'm sick of lying in this bed.\"  Continue  No evidence of SOB, CP, abdominal pain, N/V/D.    Assessment & Plan   Cristy Bailey is a 77 year old female with a PMHx significant for morbid obesity, hypertension, and hyperlipidemia, who presented to [facility] 7/13/2022 with complete left-sided paralysis, left-sided facial droop, and difficulty speaking. CTA head remarkable for right MCA occlusion. She was given tenecteplase and subsequently transferred to [facility] 7/13/2022 for thrombectomy.      Hospital stay complicated by flank wound which required surgical debridement and placement of wound vac and findings LTC placement.     Acute R MCA ischemic stroke with edema and right to left midline shift  S/p tenecteplase and subsequent R MCA mechanical thrombectomy 7/13/22  * Presented to [facility] 7/13 with complete paralysis, left-sided facial droop, and aphasia. Code stroke initiated. Head CT 7/13 showed signs of an evolving R MCA infarct. CTA head 7/13 showed a right carotid terminus occlusion, right middle cerebral artery M1  segment occlusion with poor opacification of the more distal right MCA branches/poor collateral flow. CTA neck 7/13 negative for acute occlusions. Pt given tenecteplase 7/13 at 13:11. Noted to be agitated and was subsequently intubated given need for procedure.   * Subsequently transferred to [facility] 7/13/2022 where she underwent above procedure.   7/14: Extubated. Head CT 7/14 showed evolution of acute infarct involving the R MCA distribution with increased swelling, cortical effacement, and new mild right-to-left midline shift; questionable hypoattenuation involving the bilateral occipital lobes which could be " artifactual.   * Additional stroke workup pursued this stay -- echo 7/14 showed EF 50%, grade 1 diastolic dysfunction   * Started ASA and atorvastatin per stroke team.   * Repeat head CT 7/15 showed evolving R MCA stroke with areas of edema, overall stable.     -- conts on full dose ASA and statin  -- goal SBP <140/90 and met.   -- 30d cardiac event monitor at discharge  -- will need to follow up with [facility] in 6-8 wks (due beginning of September)     Anxiety   Cognitive impairment, confusion as above.   * As stay has progressed, patient has consistently been oriented x2 (to self, location), unable to state the year. Also noted to have intermittent situational confusion. States family members have been visiting when they have not.   * Ongoing issues with anxiety this stay -- had been on regimen of Seroquel 12.5mg at dinner and 25mg HS with 12.5mg po q6h prn available. Mirtazapine 7.5mg on 8/15 evening given decreased appetite  * Psych consulted for assistance with ongoing mgmt -- seen on 8/16 and meds adjusted. Seroquel HS changed to Abilify 5mg HS. Recommended Zyptexa 5mg q6h prn for breakthrough insomnia/agitation while going off Seroquel. Consider uptitation of Abilify per psych recs. Advised uptitration of sertraline (25mg x3d then increase to 50mg daily beginning 8/20) and Remeron stopped. Also advised to liberalize Ativan to 0.5mg q6h prn.    * PRN ativan stopped on 08/27/22 as leading to increased confusion. Hydroxyzine stopped  as well.     -- cont current regimen of meds including Abilify 5mg every evening, Zyprexa 2.5mg HS and sertraline 50mg daily  -- cont prns per psych including Zyprexa 5mg q6h prn (last dose given 8/31)   -- psych consult appreciated on 8/24, increased Abilify from 5 to 10 mg.      Urinary retention, RESOLVED   * Retaining urine on 8/14. UA WNL, not worrisome for infection  * Requiring straight cath x1 on 8/20 PM  * Good UOP, no longer requiring straight cath. No evidence of obstruction  on PVR.      Dysphagia due to CVA: Improved  * Seen by SLP and noted with dysphagia. Required some intermittent fluid boluses.   * Was eventually able to advance to regular diet w/thin liquids     Dyslipidemia  * FLP this stay showed tot cholest 163, HDL 47, LDL 91, .   * Started on atorvastatin 40 mg daily     Volume overload dt diastolic CHF exacerbation: Improved  Essential hypertension  Hypokalemia on lasix   * Not on/needing meds PTA.   * As above, echo this stay showed EF 50% with grade I diastolic dysfunction; RV not well visualized but appeared mild-moderately dilated with global systolic function probably mildly reduced.   * BPs were initially soft this stay and required IVFs. BPs improved.   * Developed pedal edema required IV Lasix. Ultimately transition to oral Lasix.  * Started on lisinopril this stay  -- cont lisinopril 10mg daily  -- cont Lasix BID (40 mg po in AM, 20 mg po in afternoon) + potassium 20 meq TID  -- Cr and K stable on above doses.     Recent Labs   Lab 08/31/22  0938 08/30/22  1822 08/29/22  0849 08/28/22  1432 08/28/22  0727 08/27/22  1305   POTASSIUM 3.5 3.4 3.7 3.9 2.9* 3.4   MAG 1.8 1.7 1.6  --  1.7 1.7     Recent Labs   Lab 09/01/22  1051   CR 0.51*     Hypophosphatemia: Resolved     Prolonged QTc   * EKG on 7/13 showed QTc 494.   * Repeat EKG on 7/30 (while on Levaquin) showed QTc table at 475. Has since completed course of abx as below.  * Had been monitoring on telemetry this stay. No concerning findings so tele was ultimately dc'd 8/19     Chronic bilateral LE edema with chronic venous stasis dermatitis and chronic skin changes  Hx of LLE cellulitis  * Was hospitalized in 11/2021 for sepsis dt to pneumonia and LLE cellulitis.   * During this stay, BLE noted to be patty and edematous, no unilateral leg swelling appreciated; LLE had patchy areas of erythema, no fluctuance, no painful areas with palpation; legs warm to touch. Lymphedema consulted.   -- cont LE elevation,  lymphedema wraps     Lower back/R flank wound/abscess, suspect dt pressure type injury, s/p surgical debridement on 7/30/22 and placement of wound vac on 8/2/22  OA  Hx of bilateral hip replacements   Hx of chronic neck and back pain  * Pt with significant back pain early on in hospital stay. Was requiring IV hydromorphone.  Dose had to be decreased dt somnolence.  * MRI L-spine in 2018 showed multilevel degenerative changes with mild central stenosis. Patient has chronic back pain, reports having it over the last 2 years.   * During this stay, patient was noted with 5-6cm by 2-3cm wound with swelling/fluid/blistering in a fold of her lower back, did not appear infected; this seemed to be the source of her pain. Padded dressing placed and WOC RN ordered. Pain initially improved.   * Was placed on course of Augmentin on 7/24.   * On 7/26, was re-evaluated by WOC RN. Wound appeared more erythematous and purulence expressed. Worsening with shear stress from lift. Procal <0.05, WBC WNL. Abx changed to IV clindamycin.  * Wound cultures obtained. On 7/28, wound grew proteus and staph aureus (MRSA neg). US neg for abscess.   * Lost IV access on 7/28 so abx were changed to oral clindamycin and oral levaquin. IV access re-established but was continued on oral abx.  * General surgery consulted, ultimately underwent excisional debridement of R flank abscess in OR on 7/30. Intraop cultures showed polymicrobial growth with proteus mirabilis x2 strains (both pan-sensitive), corynebacterium striatum and enterococcus faecalis.  * Wound vac placed per general surgery on 8/2 >> now discontinued.   * ID consulted on 8/2 and abx narrowed to Augmentin alone, completed an additional 5 days of treatment on 8/7.   -- wound per WOC RN, follows weekly, last check 8/24  -- prns available for pain  -- cont regular repositioning    Pressure injury bilateral buttocks  * Noted by nursing on 09/02/22     WOC consut     Suspected meningioma left  parietal region, incidentally seen on CT  * Head CT 7/13 showed a calcified extra-axial mass overlying the left parietal region measuring 1.4 cm, potentially representing a meningioma.    Will need serial monitoring OP after discharge.      Anemia, suspect dilutional component, RESOLVED  * Hgb normal on admit. Hgb 11.5 on 7/16. No overt clinical signs of major bleeding.  * Hgb now stable at 12-13     Recent Labs   Lab 08/22/22  1259   HGB 13.6         Intertriginous dermatitis  * Chronic and stable, cont clotrimazole powder     Constipation  * Continue sched bowel regimen     Morbid obesity  * BMI 59 on admission. Recommend aggressive dietary and lifestyle modifications as condition improves     Suspected sleep apnea  O2 drop to mid 80s overnight 8/14. Briefly placed on 4LPM, which she then removed and then slept okay with sats in 90s thereafter.    recommend sleep study as outpatient     Moderate malnutrition in context of acute illness and chronic disease  * Nutritionist following. Appetite poor this stay, needing encouragement to take po.      COVID-19 vaccination status  Received first dose of vaccine on 8/28.     Clinically Significant Risk Factors Present on Admission                        Diet: Orders Placed This Encounter      Advance Diet as Tolerated      Regular Diet Adult Thin Liquids (level 0) (Upright position, alert, and assist as needed)     IVF: None   [place] Catheter: Not present     DVT Prophylaxis: Enoxaparin (Lovenox) SQ  Code Status: Full Code     Disposition: Expected discharge when placement found. Medically stable.   Communication: Discussed with patient and RN on 09/02/22    Vianca Teixeira MD  Hospitalist Service  [facility]  Securely message with the [employer] Web Console (learn more here)  Text page via [employer] Paging/Directory    -Data reviewed today: I reviewed all new labs and imaging results over the last 24 hours. I personally reviewed no images or EKG's  today.    Physical Exam   Temp: 97.6  F (36.4  C) Temp src: Oral BP: 110/54 Pulse: 74   Resp: 16 SpO2: 92 % O2 Device: None (Room air)    Vitals:    08/14/22 0600 08/24/22 0645 08/31/22 1400   Weight: (!) 153.9 kg (339 lb 3.2 oz) 143.8 kg (317 lb 1.6 oz) 140.8 kg (310 lb 6.5 oz)     Vital Signs with Ranges  Temp:  [97.2  F (36.2  C)-97.6  F (36.4  C)] 97.6  F (36.4  C)  Pulse:  [74] 74  Resp:  [16-18] 16  BP: (105-130)/(47-57) 110/54  SpO2:  [92 %-93 %] 92 %  I/O last 3 completed shifts:  In: 240 [P.O.:240]  Out: 700 [Urine:700]    Today's Exam  Constitutional:  NAD,   Neuropsyche: sleeping  Respiratory:  Breathing comfortably, good air exchange, no wheezes, no crackles.   Cardiovascular:  Regular rate and rhythm,1-2+ edema.  GI:  soft, NT/ND, BS normal  Skin/Integumen:  No acute rash or sign of bleeding.     Medications   All medications reviewed on 09/02/22     - MEDICATION INSTRUCTIONS -         acetaminophen  975 mg Oral Q8H     ARIPiprazole  10 mg Oral QPM     aspirin  325 mg Oral Daily     atorvastatin  40 mg Oral QPM     COVID-19 mRNA vacc (PFIZER)  30 mcg Intramuscular Q21 Days     diclofenac  2 g Topical 4x Daily     enoxaparin ANTICOAGULANT  40 mg Subcutaneous Q12H     furosemide  20 mg Oral Daily at 4 pm     furosemide  40 mg Oral Daily     lactobacillus rhamnosus (GG)  1 capsule Oral BID     lisinopril  10 mg Oral Daily     miconazole   Topical BID     multivitamin w/minerals  1 tablet Oral Daily     OLANZapine  2.5 mg Oral At Bedtime     polyethylene glycol  17 g Oral Daily     potassium chloride  20 mEq Oral TID     pramoxine   Topical TID     senna-docusate  1-2 tablet Oral BID     sertraline  50 mg Oral Daily     PRN Meds: sore throat, bisacodyl, calcium carbonate, diphenhydrAMINE **OR** diphenhydrAMINE, EPINEPHrine, hydrALAZINE, labetalol, lidocaine 4%, lidocaine (buffered or not buffered), - MEDICATION INSTRUCTIONS -, naloxone **OR** naloxone **OR** naloxone **OR** naloxone, OLANZapine zydis,  ondansetron **OR** ondansetron, oxyCODONE    Data   Recent Labs   Lab 09/01/22  1051 08/31/22  1425 08/31/22  0938 08/30/22  1822 08/29/22  0849 08/28/22  1432    241  --   --   --  276   POTASSIUM  --   --  3.5 3.4 3.7 3.9   CR 0.51*  --   --   --   --   --        No results found for this or any previous visit (from the past 24 hour(s)).

## 2022-09-02 NOTE — PLAN OF CARE
DATE & TIME: 9/1/22 1900-0730    Cognitive Concerns/ Orientation : Alert and oriented to self only, fluctuates   BEHAVIOR & AGGRESSION TOOL COLOR: Green  CIWA SCORE: N/A   ABNL VS/O2: VSS on RA  MOBILITY: Total/lift Ax2-3  PAIN MANAGMENT: c/o generalized pain, refused any medications this shift  DIET: Regular, sitting upright  BOWEL/BLADDER: Incontinent of Bowel and bladder. Using PURwick  ABNL LAB/BG: K 3.5, Mg 1.8, Creat 0.51  DRAIN/DEVICES: PURwick  TELEMETRY RHYTHM: N/A  SKIN: Lower back/R flank wound, dry patty, flaky BLE, +1-2 BLE edema  TESTS/PROCEDURES: none this shift  D/C DATE: Pending  Discharge Barriers: needs LTC, Behaviors  OTHER IMPORTANT INFO: Per orders, Vitals only twice a day and neuro checks once a day

## 2022-09-02 NOTE — PLAN OF CARE
Summary: CVA R MCA  DATE & TIME: 9/2/2022 3467-5288   Cognitive Concerns/ Orientation : A&O 2-3, disoriented to time and situation sometimes. Waxes and wanes.   BEHAVIOR & AGGRESSION TOOL COLOR: Green- does call out sometimes for help when confused, tearful at times  CIWA SCORE: N/A   ABNL VS/O2: VSS on RA  MOBILITY: Total/lift  PAIN MANAGMENT: Denies- declined scheduled tylenol and voltaren gel  DIET: Regular, fair appetite, must sit up right for meals per speech  BOWEL/BLADDER: Incontinent of Bowel and bladder. Using Purewick- no BM  ABNL LAB/BG: none new  DRAIN/DEVICES: Purewick, no IV access MD aware  TELEMETRY RHYTHM: N/A  SKIN: Lower back/R flank wound dressing changed today; dry patty, flaky BLE, +1-2 BLE edema (cleansed and new edemawear applied). Pannus/skin folds improved (no open wounds), shreyas cleanse and antifungal applied. New WOC consult for bilateral buttock small openings.   TESTS/PROCEDURES: none this shift  D/C DATE: Pending palcement  Discharge Barriers: needs LTC, Behaviors  OTHER IMPORTANT INFO: Per orders, Vitals only twice a day and neuro checks once a day. Neuros intact, BLE weakness.

## 2022-09-03 NOTE — PLAN OF CARE
Goal Outcome Evaluation:                    Summary: CVA R MCA  DATE & TIME: 9/2/22-9/3/22 1317-9669  Cognitive Concerns/ Orientation : A&O 2-3, disoriented to time and situation sometimes. Waxes and wanes.   BEHAVIOR & AGGRESSION TOOL COLOR: Green- does call out sometimes for help when confused, tearful at times  CIWA SCORE: N/A   ABNL VS/O2: VSS on RA  MOBILITY: Total/lift  PAIN MANAGMENT: Back pain- got partial scheduled Tylenol dose per pt request, refused second scheduled dose. Refused voltaren gel  DIET: Regular, fair appetite, must sit up right for meals per speech  BOWEL/BLADDER: Incontinent of Bowel and bladder. Using Purewick. No BM this shift  ABNL LAB/BG: none new  DRAIN/DEVICES: Purewick, no IV access MD aware  TELEMETRY RHYTHM: N/A  SKIN: Lower back/R flank wound dressing CDI; dry patty, flaky BLE, +1-2 BLE edema - edemaware on. Pannus/skin folds improved (no open wounds), shreyas cleanse and antifungal applied. New WOC consult for bilateral buttock small openings.   TESTS/PROCEDURES: none this shift  D/C DATE: Pending palcement  Discharge Barriers: needs LTC, Behaviors  OTHER IMPORTANT INFO: Per orders, Vitals only twice a day and neuro checks once a day. Neuros intact, BLE weakness.

## 2022-09-03 NOTE — PROGRESS NOTES
[facility]    Hospitalist Progress Note    Date of Service (when I saw the patient): 09/03/2022  Admit date: 7/13/2022    Interval History   Full details of events over last 24 hours outlined below.   Patient often refusing scheduled potassium and tylenol.   She no longer needs scheduled pramoxine and voltaren for pruritis and neck pain. Both of these things have resolved.Discontinued.  Check potassium this morning and it was low.  Patient now taking her potassium.  She has been up out of the bed twice today.  Nursing notes decreased appetite.  No evidence of SOB, CP, abdominal pain, N/V/D.    Assessment & Plan   Cristy Bailey is a 77 year old female with a PMHx significant for morbid obesity, hypertension, and hyperlipidemia, who presented to [facility] 7/13/2022 with complete left-sided paralysis, left-sided facial droop, and difficulty speaking. CTA head remarkable for right MCA occlusion. She was given tenecteplase and subsequently transferred to [facility] 7/13/2022 for thrombectomy.      Hospital stay complicated by flank wound which required surgical debridement and placement of wound vac and findings LTC placement.     Acute R MCA ischemic stroke with edema and right to left midline shift  S/p tenecteplase and subsequent R MCA mechanical thrombectomy 7/13/22  * Presented to [facility] 7/13 with complete paralysis, left-sided facial droop, and aphasia. Code stroke initiated. Head CT 7/13 showed signs of an evolving R MCA infarct. CTA head 7/13 showed a right carotid terminus occlusion, right middle cerebral artery M1  segment occlusion with poor opacification of the more distal right MCA branches/poor collateral flow. CTA neck 7/13 negative for acute occlusions. Pt given tenecteplase 7/13 at 13:11. Noted to be agitated and was subsequently intubated given need for procedure.   * Subsequently transferred to [facility] 7/13/2022 where she underwent above procedure.   7/14:  Extubated. Head CT 7/14 showed evolution of acute infarct involving the R MCA distribution with increased swelling, cortical effacement, and new mild right-to-left midline shift; questionable hypoattenuation involving the bilateral occipital lobes which could be artifactual.   * Additional stroke workup pursued this stay -- echo 7/14 showed EF 50%, grade 1 diastolic dysfunction   * Started ASA and atorvastatin per stroke team.   * Repeat head CT 7/15 showed evolving R MCA stroke with areas of edema, overall stable.     -- conts on full dose ASA and statin  -- goal SBP <140/90 and met.   -- 30d cardiac event monitor at discharge  -- will need to follow up with [facility] in 6-8 wks (due beginning of September)     Anxiety   Cognitive impairment, confusion as above.   * As stay has progressed, patient has consistently been oriented x2 (to self, location), unable to state the year. Also noted to have intermittent situational confusion. States family members have been visiting when they have not.   * Ongoing issues with anxiety this stay -- had been on regimen of Seroquel 12.5mg at dinner and 25mg HS with 12.5mg po q6h prn available. Mirtazapine 7.5mg on 8/15 evening given decreased appetite  * Psych consulted for assistance with ongoing mgmt -- seen on 8/16 and meds adjusted. Seroquel HS changed to Abilify 5mg HS. Recommended Zyptexa 5mg q6h prn for breakthrough insomnia/agitation while going off Seroquel. Consider uptitation of Abilify per psych recs. Advised uptitration of sertraline (25mg x3d then increase to 50mg daily beginning 8/20) and Remeron stopped. Also advised to liberalize Ativan to 0.5mg q6h prn.    * PRN ativan stopped on 08/27/22 as leading to increased confusion. Hydroxyzine stopped  as well.     -- cont current regimen of meds including Abilify 5mg every evening, Zyprexa 2.5mg HS and sertraline 50mg daily  -- cont prns per psych including Zyprexa 5mg q6h prn (last dose given 8/31)   -- psych consult  appreciated on 8/24, increased Abilify from 5 to 10 mg.      Urinary retention, RESOLVED   * Retaining urine on 8/14. UA WNL, not worrisome for infection  * Requiring straight cath x1 on 8/20 PM  * Good UOP, no longer requiring straight cath. No evidence of obstruction on PVR.      Dysphagia due to CVA: Improved  * Seen by SLP and noted with dysphagia. Required some intermittent fluid boluses.   * Was eventually able to advance to regular diet w/thin liquids     Dyslipidemia  * FLP this stay showed tot cholest 163, HDL 47, LDL 91, .   * Started on atorvastatin 40 mg daily     Volume overload dt diastolic CHF exacerbation: Improved  Essential hypertension  Hypokalemia on lasix   * Not on/needing meds PTA.   * As above, echo this stay showed EF 50% with grade I diastolic dysfunction; RV not well visualized but appeared mild-moderately dilated with global systolic function probably mildly reduced.   * BPs were initially soft this stay and required IVFs. BPs improved.   * Developed pedal edema required IV Lasix. Ultimately transition to oral Lasix.  * Started on lisinopril this stay    -- BP well controlled on 09/03/22   -- cont lisinopril 10mg daily   -- cont Lasix BID (40 mg po in AM, 20 mg po in afternoon) + potassium 20 meq TID  -- Recurrent low potassium due to refusing potassium medications.  We have tried different formulations.  -- On 09/03/22 - Decreased lasix to 20 mg BID and added spironolactone 25 mg daily   -- Decreased potassium to 20 meq once daily    Recent Labs   Lab 09/03/22  1023 08/31/22  0938 08/30/22  1822 08/29/22  0849 08/28/22  1432 08/28/22  0727   POTASSIUM 3.1* 3.5 3.4 3.7 3.9 2.9*   MAG  --  1.8 1.7 1.6  --  1.7     Recent Labs   Lab 09/01/22  1051   CR 0.51*         Hypophosphatemia: Resolved     Prolonged QTc   * EKG on 7/13 showed QTc 494.   * Repeat EKG on 7/30 (while on Levaquin) showed QTc table at 475. Has since completed course of abx as below.  * Had been monitoring on  telemetry this stay. No concerning findings so tele was ultimately dc'd 8/19     Chronic bilateral LE edema with chronic venous stasis dermatitis and chronic skin changes  Hx of LLE cellulitis  * Was hospitalized in 11/2021 for sepsis dt to pneumonia and LLE cellulitis.   * During this stay, BLE noted to be patty and edematous, no unilateral leg swelling appreciated; LLE had patchy areas of erythema, no fluctuance, no painful areas with palpation; legs warm to touch. Lymphedema consulted.   -- cont LE elevation, lymphedema wraps     Lower back/R flank wound/abscess, suspect dt pressure type injury, s/p surgical debridement on 7/30/22 and placement of wound vac on 8/2/22  OA  Hx of bilateral hip replacements   Hx of chronic neck and back pain  * Pt with significant back pain early on in hospital stay. Was requiring IV hydromorphone.  Dose had to be decreased dt somnolence.  * MRI L-spine in 2018 showed multilevel degenerative changes with mild central stenosis. Patient has chronic back pain, reports having it over the last 2 years.   * During this stay, patient was noted with 5-6cm by 2-3cm wound with swelling/fluid/blistering in a fold of her lower back, did not appear infected; this seemed to be the source of her pain. Padded dressing placed and WOC RN ordered. Pain initially improved.   * Was placed on course of Augmentin on 7/24.   * On 7/26, was re-evaluated by WOC RN. Wound appeared more erythematous and purulence expressed. Worsening with shear stress from lift. Procal <0.05, WBC WNL. Abx changed to IV clindamycin.  * Wound cultures obtained. On 7/28, wound grew proteus and staph aureus (MRSA neg). US neg for abscess.   * Lost IV access on 7/28 so abx were changed to oral clindamycin and oral levaquin. IV access re-established but was continued on oral abx.  * General surgery consulted, ultimately underwent excisional debridement of R flank abscess in OR on 7/30. Intraop cultures showed polymicrobial growth  with proteus mirabilis x2 strains (both pan-sensitive), corynebacterium striatum and enterococcus faecalis.  * Wound vac placed per general surgery on 8/2 >> now discontinued.   * ID consulted on 8/2 and abx narrowed to Augmentin alone, completed an additional 5 days of treatment on 8/7.   -- wound per WOC RN, follows weekly, last check 8/24  -- prns available for pain  -- cont regular repositioning    Pressure injury bilateral buttocks  * Noted by nursing on 09/02/22     WOC consult appreciated.      Suspected meningioma left parietal region, incidentally seen on CT  * Head CT 7/13 showed a calcified extra-axial mass overlying the left parietal region measuring 1.4 cm, potentially representing a meningioma.    Will need serial monitoring OP after discharge.      Anemia, suspect dilutional component, RESOLVED  * Hgb normal on admit. Hgb 11.5 on 7/16. No overt clinical signs of major bleeding.  * Hgb now stable at 12-13     Recent Labs   Lab 08/22/22  1259   HGB 13.6         Intertriginous dermatitis  * Chronic and stable, cont clotrimazole powder     Constipation  * Continue sched bowel regimen     Morbid obesity  * BMI 59 on admission. Recommend aggressive dietary and lifestyle modifications as condition improves     Suspected sleep apnea  O2 drop to mid 80s overnight 8/14. Briefly placed on 4LPM, which she then removed and then slept okay with sats in 90s thereafter.    recommend sleep study as outpatient     Moderate malnutrition in context of acute illness and chronic disease  * Nutritionist following. Appetite poor this stay, needing encouragement to take po.      COVID-19 vaccination status  Received first dose of vaccine on 8/28.     Clinically Significant Risk Factors Present on Admission                        Diet: Orders Placed This Encounter      Advance Diet as Tolerated      Regular Diet Adult Thin Liquids (level 0) (Upright position, alert, and assist as needed)     IVF: None   [place] Catheter: Not  present     DVT Prophylaxis: Enoxaparin (Lovenox) SQ  Code Status: Full Code     Disposition: Expected discharge when placement found. Medically stable.   Communication: Discussed with patient and RN on 09/02/22    Vianca Teixeira MD  Hospitalist Service  [facility]  Securely message with the Vocera Web Console (learn more here)  Text page via [employer] Paging/Directory    -Data reviewed today: I reviewed all new labs and imaging results over the last 24 hours. I personally reviewed no images or EKG's today.    Physical Exam   Temp: 97.6  F (36.4  C) Temp src: Oral BP: 110/54 Pulse: 74   Resp: 16 SpO2: 92 % O2 Device: None (Room air)    Vitals:    08/14/22 0600 08/24/22 0645 08/31/22 1400   Weight: (!) 153.9 kg (339 lb 3.2 oz) 143.8 kg (317 lb 1.6 oz) 140.8 kg (310 lb 6.5 oz)     Vital Signs with Ranges  Temp:  [97.2  F (36.2  C)-97.6  F (36.4  C)] 97.6  F (36.4  C)  Pulse:  [74] 74  Resp:  [16-18] 16  BP: (110-130)/(54-57) 110/54  SpO2:  [92 %-93 %] 92 %  I/O last 3 completed shifts:  In: 240 [P.O.:240]  Out: 1150 [Urine:1150]    Today's Exam  Constitutional:  NAD,   Neuropsyche: alert and oriented to being in the hospital, not situation.   Respiratory:  Breathing comfortably, good air exchange, no wheezes, no crackles.   Cardiovascular:  Regular rate and rhythm,1-2+ edema.   GI:  soft, NT/ND, BS normal  Skin/Integumen:  No acute rash or sign of bleeding. Yellow deformed toe nails.     Medications   All medications reviewed on 09/02/22     - MEDICATION INSTRUCTIONS -         acetaminophen  975 mg Oral Q8H     ARIPiprazole  10 mg Oral QPM     aspirin  325 mg Oral Daily     atorvastatin  40 mg Oral QPM     COVID-19 mRNA vacc (PFIZER)  30 mcg Intramuscular Q21 Days     enoxaparin ANTICOAGULANT  40 mg Subcutaneous Q12H     furosemide  20 mg Oral Daily at 4 pm     furosemide  40 mg Oral Daily     lactobacillus rhamnosus (GG)  1 capsule Oral BID     lisinopril  10 mg Oral Daily     miconazole    Topical BID     multivitamin w/minerals  1 tablet Oral Daily     OLANZapine  2.5 mg Oral At Bedtime     polyethylene glycol  17 g Oral Daily     potassium chloride  20 mEq Oral TID     pramoxine   Topical TID     senna-docusate  1-2 tablet Oral BID     sertraline  50 mg Oral Daily     PRN Meds: sore throat, bisacodyl, calcium carbonate, diclofenac, diphenhydrAMINE **OR** diphenhydrAMINE, EPINEPHrine, hydrALAZINE, labetalol, lidocaine 4%, lidocaine (buffered or not buffered), - MEDICATION INSTRUCTIONS -, naloxone **OR** naloxone **OR** naloxone **OR** naloxone, OLANZapine zydis, ondansetron **OR** ondansetron, oxyCODONE    Data   Recent Labs   Lab 09/01/22  1051 08/31/22  1425 08/31/22  0938 08/30/22  1822 08/29/22  0849 08/28/22  1432    241  --   --   --  276   POTASSIUM  --   --  3.5 3.4 3.7 3.9   CR 0.51*  --   --   --   --   --        No results found for this or any previous visit (from the past 24 hour(s)).

## 2022-09-04 NOTE — PROGRESS NOTES
[facility]  WO Nurse Inpatient Assessment     Consulted for: Right lower back wound (Stage 3 HAPI) and NEW BL Buttock wounds       Areas Assessed:      Areas visualized during today's visit: right skin fold     Wound location: Right lower back in waist crease                  Correct date , photo labeled         Wound due to: Hospital Acquired Pressure injury stage 3   Stage: Unstageable , Following surgical debridement  Stage 3 Pressure Injury and Moisture Associated Skin Damage (MASD), suspect intertriginous pressure, appears to be deeper tissue damage within a crease, possible shear component from lift   Wound history/plan of care: aquacel ag and sacral mepilex will increase dressing frequency to decrease hypergranulation  Wound base: granulation      Palpation of the wound bed: normal       Drainage: moderate (dressing not saturated)     Description of drainage: serosanguinous      Measurements (length x width x depth, in cm) 2 x 14.5 x 0.3cm no changes     Tunneling N/A      Undermining NA  Periwound skin: necrotic tissue lifting, only one small (less than 0.5cm x 1cm) at Left lower edge of wound      Color: pink       Temperature: normal to warm and sweaty  Odor: none    Pain: none   Pain intervention prior to dressing change: none needed   Treatment goal: Heal  and decrease hypergranulation  STATUS: improving   Supplies ordered: at bedside     Skin Injury Location: BL inner buttock    Last photo: none taken  Skin injury due to: Excoriation (scratch marks)  Skin history and plan of care:   Pt with deep ray cleft and large buttock. Pt more alert and reports itching to buttock from time to time  Affected area:      Skin assessment: Intact and Superficial scabbing     Measurements (length x width x depth, in cm) approx 1cm linear scratch to BL inner buttock     Color: normal and consistent with surrounding tissue     Temperature  normal      Drainage: none .       "Color: none      Odor: none  Pain: denies , none  Pain interventions prior to dressing change: N/A  Treatment goal: Heal   STATUS: initial assessment  Supplies ordered: at bedside      Treatment Plan:     Posterior waist crease: Every other day Even Days  1. Remove dressings and discard. Wet Aquacel AG to ease removal if dry still.   2. Cleanse wound and periwound skin with Vashe ( #[id number]) solution and 4x4\" gauze, pat dry   3. Apply 3M No Sting barrier wipe to periwound skin, let dry   4. Apply Dry Aquacel Ag (from unit closet) into wound bed, then cover with sacral mepilex dressing + a 4x4\" mepilex to cover whole area    BL inner buttock and  cleft: BID and PRN  1.Cleanse the area with Azra cleanse and protect and marga dry wipes/soft cloth.   2. Apply nickel thick layer of Triad paste (#[id number]) to wound bed and thin layer over reddened areas   **No need to remove all paste after each incontinent episode, remove soiled paste and reapply as needed.  **If complete removal of paste is necessary use baby oil/mineral oil (Located in Pharmacy) and soft wash cloth.   Leave the brief off in bed to let the area dry as much as possible.   Use only one Covidien pad in between mattress and pt. No brief while in bed.    Orders: Updated    RECOMMEND PRIMARY TEAM ORDER: None, at this time  Education provided: importance of repositioning, plan of care and Off-loading pressure  Discussed plan of care with: Patient and Nurse  WOC nurse follow-up plan: weekly  Notify WOC if wound(s) deteriorate.  Nursing to notify the Provider(s) and re-consult the WOC Nurse if new skin concern.    DATA:     Current support surface: Bariatric Low air loss mattress    Containment of urine/stool: Incontinence Protocol and Incontinent pad in bed  BMI: Body mass index is 50.44 kg/m .   Active diet order: Orders Placed This Encounter      Advance Diet as Tolerated      Regular Diet Adult Thin Liquids (level 0) (Upright position, alert, and " assist as needed)     Output: I/O last 3 completed shifts:  In: 220 [P.O.:220]  Out: 550 [Urine:550]     Labs:   No lab results found in last 7 days.    Invalid input(s): GLUCOMBO  Pressure injury risk assessment:   Sensory Perception: 3-->slightly limited  Moisture: 3-->occasionally moist  Activity: 2-->chairfast  Mobility: 2-->very limited  Nutrition: 2-->probably inadequate  Friction and Shear: 2-->potential problem  Demetrio Score: 14    Telly Meyer RN CWOCN   Dept. Pager: [phone]  Dept. Office Number: [phone]

## 2022-09-04 NOTE — PROGRESS NOTES
[facility]    Hospitalist Progress Note    Date of Service (when I saw the patient): 09/04/2022  Admit date: 7/13/2022    Interval History   Full details of events over last 24 hours outlined below.   K 3.4 this morning. Would take scheduled potassium but not replacement per protocol.   Appetite remains low.   Patient sleeping when I came in. When I looked at her feet, she woke up, saying I am cold cover me up. She would not stay awake to engage in further conversation.   No evidence of SOB, CP, abdominal pain, N/V/D.    Assessment & Plan   Cristy Bailey is a 77 year old female with a PMHx significant for morbid obesity, hypertension, and hyperlipidemia, who presented to [facility] 7/13/2022 with complete left-sided paralysis, left-sided facial droop, and difficulty speaking. CTA head remarkable for right MCA occlusion. She was given tenecteplase and subsequently transferred to [facility] 7/13/2022 for thrombectomy.      Hospital stay complicated by flank wound which required surgical debridement and placement of wound vac and findings LTC placement.     Acute R MCA ischemic stroke with edema and right to left midline shift  S/p tenecteplase and subsequent R MCA mechanical thrombectomy 7/13/22  * Presented to [facility] 7/13 with complete paralysis, left-sided facial droop, and aphasia. Code stroke initiated. Head CT 7/13 showed signs of an evolving R MCA infarct. CTA head 7/13 showed a right carotid terminus occlusion, right middle cerebral artery M1  segment occlusion with poor opacification of the more distal right MCA branches/poor collateral flow. CTA neck 7/13 negative for acute occlusions. Pt given tenecteplase 7/13 at 13:11. Noted to be agitated and was subsequently intubated given need for procedure.   * Subsequently transferred to [facility] 7/13/2022 where she underwent above procedure.   7/14: Extubated. Head CT 7/14 showed evolution of acute infarct involving the R  MCA distribution with increased swelling, cortical effacement, and new mild right-to-left midline shift; questionable hypoattenuation involving the bilateral occipital lobes which could be artifactual.   * Additional stroke workup pursued this stay -- echo 7/14 showed EF 50%, grade 1 diastolic dysfunction   * Started ASA and atorvastatin per stroke team.   * Repeat head CT 7/15 showed evolving R MCA stroke with areas of edema, overall stable.     -- conts on full dose ASA and statin  -- goal SBP <140/90 and met.   -- 30d cardiac event monitor at discharge  -- will need to follow up with [facility] in 6-8 wks (due beginning of September)     Anxiety   Cognitive impairment, confusion as above.   * As stay has progressed, patient has consistently been oriented x2 (to self, location), unable to state the year. Also noted to have intermittent situational confusion. States family members have been visiting when they have not.   * Ongoing issues with anxiety this stay -- had been on regimen of Seroquel 12.5mg at dinner and 25mg HS with 12.5mg po q6h prn available. Mirtazapine 7.5mg on 8/15 evening given decreased appetite  * Psych consulted for assistance with ongoing mgmt -- seen on 8/16 and meds adjusted. Seroquel HS changed to Abilify 5mg HS. Recommended Zyptexa 5mg q6h prn for breakthrough insomnia/agitation while going off Seroquel. Consider uptitation of Abilify per psych recs. Advised uptitration of sertraline (25mg x3d then increase to 50mg daily beginning 8/20) and Remeron stopped. Also advised to liberalize Ativan to 0.5mg q6h prn.    * PRN ativan stopped on 08/27/22 as leading to increased confusion. Hydroxyzine stopped  as well.     -- cont current regimen of meds including Abilify 5mg every evening, Zyprexa 2.5mg HS and sertraline 50mg daily  -- cont prns per psych including Zyprexa 5mg q6h prn (last dose given 8/31)   -- psych consult appreciated on 8/24, increased Abilify from 5 to 10 mg.      Urinary retention,  RESOLVED   * Retaining urine on 8/14. UA WNL, not worrisome for infection  * Requiring straight cath x1 on 8/20 PM  * Good UOP, no longer requiring straight cath. No evidence of obstruction on PVR.      Dysphagia due to CVA: Improved  * Seen by SLP and noted with dysphagia. Required some intermittent fluid boluses.   * Was eventually able to advance to regular diet w/thin liquids     Dyslipidemia  * FLP this stay showed tot cholest 163, HDL 47, LDL 91, .   * Started on atorvastatin 40 mg daily     Volume overload dt diastolic CHF exacerbation: Improved  Essential hypertension  Hypokalemia, recurrent on lasix, and often refusing potassium   * Not on/needing meds PTA.   * As above, echo this stay showed EF 50% with grade I diastolic dysfunction; RV not well visualized but appeared mild-moderately dilated with global systolic function probably mildly reduced.   * BPs were initially soft this stay and required IVFs. BPs improved.   * Developed pedal edema required IV Lasix. Ultimately transition to oral Lasix.  * Started on lisinopril this stay    -- BP well controlled on 09/03/22   -- cont lisinopril 10mg daily   -- cont Lasix BID (40 mg po in AM, 20 mg po in afternoon) + potassium 20 meq TID  -- Recurrent low potassium due to refusing potassium medications.  We have tried different formulations.  -- On 09/03/22 - Decreased lasix to 20 mg BID and added spironolactone 25 mg daily   -- Decreased potassium to 20 meq once daily  -- Follow up BMP in AM    Recent Labs   Lab 09/04/22  0822 09/03/22  1749 09/03/22  1023 08/31/22  0938 08/30/22  1822 08/29/22  0849   POTASSIUM 3.4 3.8 3.1* 3.5 3.4 3.7   MAG  --   --   --  1.8 1.7 1.6     Recent Labs   Lab 09/04/22  0822 09/01/22  1051   CR 0.66 0.51*       Hypophosphatemia: Resolved     Prolonged QTc   * EKG on 7/13 showed QTc 494.   * Repeat EKG on 7/30 (while on Levaquin) showed QTc table at 475. Has since completed course of abx as below.  * Had been monitoring on  telemetry this stay. No concerning findings so tele was ultimately dc'd 8/19     Chronic bilateral LE edema with chronic venous stasis dermatitis and chronic skin changes  Hx of LLE cellulitis  * Was hospitalized in 11/2021 for sepsis dt to pneumonia and LLE cellulitis.   * During this stay, BLE noted to be patty and edematous, no unilateral leg swelling appreciated; LLE had patchy areas of erythema, no fluctuance, no painful areas with palpation; legs warm to touch. Lymphedema consulted.   -- cont LE elevation, lymphedema wraps     Lower back/R flank wound/abscess, suspect dt pressure type injury, s/p surgical debridement on 7/30/22 and placement of wound vac on 8/2/22  OA  Hx of bilateral hip replacements   Hx of chronic neck and back pain  * Pt with significant back pain early on in hospital stay. Was requiring IV hydromorphone.  Dose had to be decreased dt somnolence.  * MRI L-spine in 2018 showed multilevel degenerative changes with mild central stenosis. Patient has chronic back pain, reports having it over the last 2 years.   * During this stay, patient was noted with 5-6cm by 2-3cm wound with swelling/fluid/blistering in a fold of her lower back, did not appear infected; this seemed to be the source of her pain. Padded dressing placed and WOC RN ordered. Pain initially improved.   * Was placed on course of Augmentin on 7/24.   * On 7/26, was re-evaluated by WOC RN. Wound appeared more erythematous and purulence expressed. Worsening with shear stress from lift. Procal <0.05, WBC WNL. Abx changed to IV clindamycin.  * Wound cultures obtained. On 7/28, wound grew proteus and staph aureus (MRSA neg). US neg for abscess.   * Lost IV access on 7/28 so abx were changed to oral clindamycin and oral levaquin. IV access re-established but was continued on oral abx.  * General surgery consulted, ultimately underwent excisional debridement of R flank abscess in OR on 7/30. Intraop cultures showed polymicrobial growth  with proteus mirabilis x2 strains (both pan-sensitive), corynebacterium striatum and enterococcus faecalis.  * Wound vac placed per general surgery on 8/2 >> now discontinued.   * ID consulted on 8/2 and abx narrowed to Augmentin alone, completed an additional 5 days of treatment on 8/7.   -- wound per WOC RN, follows weekly, last check 8/30  -- prns available for pain  -- cont regular repositioning    Pressure injury bilateral buttocks  * Noted by nursing on 09/02/22     WOC consult appreciated.      Suspected meningioma left parietal region, incidentally seen on admission CT on 7/13  * Head CT 7/13 showed a calcified extra-axial mass overlying the left parietal region measuring 1.4 cm, potentially representing a meningioma.    Will need serial monitoring OP after discharge.      Anemia, suspect dilutional component, RESOLVED  * Hgb normal on admit. Hgb 11.5 on 7/16. No overt clinical signs of major bleeding.  * Hgb now stable at 12-13     Recent Labs   Lab 08/22/22  1259   HGB 13.6         Intertriginous dermatitis  * Chronic and stable, cont clotrimazole powder     Constipation  * Continue sched bowel regimen     Morbid obesity  * BMI 59 on admission. Recommend aggressive dietary and lifestyle modifications as condition improves     Suspected sleep apnea  O2 drop to mid 80s overnight 8/14. Briefly placed on 4LPM, which she then removed and then slept okay with sats in 90s thereafter.    recommend sleep study as outpatient     Moderate malnutrition in context of acute illness and chronic disease  * Nutritionist following. Appetite poor this stay, needing encouragement to take po.      COVID-19 vaccination status  Received first dose of vaccine on 8/28, 2nd dose 9/18     Clinically Significant Risk Factors Present on Admission                        Diet: Orders Placed This Encounter      Advance Diet as Tolerated      Regular Diet Adult Thin Liquids (level 0) (Upright position, alert, and assist as needed)      IVF: None   [place] Catheter: Not present     DVT Prophylaxis: Enoxaparin (Lovenox) SQ  Code Status: Full Code     Disposition: Expected discharge when placement found. Medically stable.   Communication: Discussed with patient and RN on 09/04/22     Vianca Teixeira MD  Hospitalist Service  [facility]  Securely message with the Vocera Web Console (learn more here)  Text page via [employer] Paging/Directory    -Data reviewed today: I reviewed all new labs and imaging results over the last 24 hours. I personally reviewed no images or EKG's today.    Physical Exam   Temp: 97.5  F (36.4  C) Temp src: Oral BP: 113/66 Pulse: 83   Resp: 16 SpO2: 96 % O2 Device: None (Room air)    Vitals:    08/31/22 1400 09/03/22 1221 09/04/22 1043   Weight: 140.8 kg (310 lb 6.5 oz) 142 kg (313 lb) 141.7 kg (312 lb 8 oz)     Vital Signs with Ranges  Temp:  [97.5  F (36.4  C)-97.6  F (36.4  C)] 97.5  F (36.4  C)  Pulse:  [83] 83  Resp:  [16] 16  BP: (111-113)/(63-66) 113/66  SpO2:  [96 %-97 %] 96 %  I/O last 3 completed shifts:  In: 220 [P.O.:220]  Out: 550 [Urine:550]    Today's Exam  Constitutional:  NAD,   Neuropsyche: Sleeping, only wakes up momentarily, Does not engage in answering questions.   Respiratory:  Breathing comfortably on RA, no wheezes, no crackles.   Cardiovascular:  Regular rate and rhythm,1-2+ edema.   GI:  soft, NT/ND, BS normal  Skin/Integumen:  No acute rash or sign of bleeding. Yellow deformed toe nails.     Medications   All medications reviewed on 09/04/22    - MEDICATION INSTRUCTIONS -         ARIPiprazole  10 mg Oral QPM     aspirin  325 mg Oral Daily     atorvastatin  40 mg Oral QPM     COVID-19 mRNA vacc (PFIZER)  30 mcg Intramuscular Q21 Days     enoxaparin ANTICOAGULANT  40 mg Subcutaneous Q12H     furosemide  20 mg Oral BID     lisinopril  10 mg Oral Daily     miconazole   Topical BID     multivitamin w/minerals  1 tablet Oral Daily     OLANZapine  2.5 mg Oral At Bedtime     polyethylene  glycol  17 g Oral Daily     potassium chloride  20 mEq Oral Daily     potassium chloride  40 mEq Oral or Feeding Tube Once     senna-docusate  1-2 tablet Oral BID     sertraline  50 mg Oral Daily     spironolactone  25 mg Oral Daily     PRN Meds: sore throat, bisacodyl, calcium carbonate, diclofenac, diphenhydrAMINE **OR** diphenhydrAMINE, EPINEPHrine, hydrALAZINE, labetalol, lidocaine 4%, lidocaine (buffered or not buffered), - MEDICATION INSTRUCTIONS -, naloxone **OR** naloxone **OR** naloxone **OR** naloxone, OLANZapine zydis, ondansetron **OR** ondansetron, oxyCODONE, pramoxine    Data   Recent Labs   Lab 09/04/22  0822 09/03/22  1749 09/03/22  1404 09/03/22  1023 09/01/22  1051 08/31/22  1425   PLT  --   --  304  --  279 241   POTASSIUM 3.4 3.8  --  3.1*  --   --    CR 0.66  --   --   --  0.51*  --        No results found for this or any previous visit (from the past 24 hour(s)).

## 2022-09-04 NOTE — PLAN OF CARE
"Goal Outcome Evaluation:                    Summary: CVA R MCA  DATE & TIME: 9/3/22-9/4/22 6785-7406  Cognitive Concerns/ Orientation: A&O 2-3, disoriented to time and place/situation sometimes. Waxes and wanes.   BEHAVIOR & AGGRESSION TOOL COLOR: Green/yellow- does call out often saying \"help\", tearful at times. Easily redirected  CIWA SCORE: N/A  ABNL VS/O2: VSS on RA  MOBILITY: Total/lift  PAIN MANAGMENT: Denies pain  DIET: Regular, must sit up right for meals per speech  BOWEL/BLADDER: Incontinent of Bowel and bladder. Using Purewick. Large loose/soft BM  ABNL LAB/BG: K 3.8 recheck in AM  DRAIN/DEVICES: Purewick, no IV access MD aware  TELEMETRY RHYTHM: N/A  SKIN: Lower back/R flank wound dressing CDI next change 9/5; dry, patty, flaky BLE, +1-2 BLE edema - edemaware on. Pannus/skin folds improved (no open wounds), shreyas cleanse and antifungal applied. New WOC consult for bilateral buttock small openings- mepilex in place.   TESTS/PROCEDURES: none this shift  D/C DATE: Pending palcement  Discharge Barriers: needs LTC, Behaviors  OTHER IMPORTANT INFO: Per orders, Vitals only twice a day and neuro checks once a day. Neuros intact, BLE weakness. PO lasix switched to spiralactone to help with potassium.    "

## 2022-09-04 NOTE — PLAN OF CARE
Summary: CVA R MCA  DATE & TIME: 9/4/2022 [id number]  Cognitive Concerns/ Orientation : A&O 2-3, disoriented to time and place/situation sometimes. Waxes and wanes.   BEHAVIOR & AGGRESSION TOOL COLOR: Green/yellow- less yelling out today. Not as tearful but is adament she is not going to LTC/TCU and wants to go home, educated patient with significant other Leo bedside.   CIWA SCORE: N/A   ABNL VS/O2: VSS on RA  MOBILITY: Total/lift. Baseline WC bound but can stand and take a few steps/pivot per significant other. Up to chair today with lift  PAIN MANAGMENT: Occasional pain in BLE and back but declines intervention  DIET: Regular, must sit up right for meals per speech, better appetite today but still not good   BOWEL/BLADDER: Incontinent of Bowel and bladder. Using Purewick, pees large amounts at a times (bladder scanned this AM for 177 today, then patient voided). No BM  ABNL LAB/BG: K 3.4, took scheduled dose but refused to drink protocol replacement, next check in AM is ok with MD  DRAIN/DEVICES: Purewick, no IV access MD aware  TELEMETRY RHYTHM: N/A  SKIN: Lower back/R flank wound dressing changed by WOC today, next change 9/6; dry, patty, flaky BLE, +1-2 BLE edema - edemaware on, cleaned today. Pannus/skin folds improved (no open wounds) still some redness under left breast, shreyas cleanse and antifungal applied. Bilateral buttock small openings- triad paste applied and in crease of buttocks, leave open.   TESTS/PROCEDURES: none this shift  D/C DATE: Pending placement  Discharge Barriers: needs LTC, Behaviors  OTHER IMPORTANT INFO: Per orders, Vitals only twice a day and neuro checks once a day. Neuros intact, BLE weakness.

## 2022-09-05 NOTE — PLAN OF CARE
Summary: CVA R MCA  DATE & TIME: 9/5/22 [id number]  Cognitive Concerns/ Orientation : A&O 2-3, disoriented to time and place/situation sometimes. Waxes and wanes.   BEHAVIOR & AGGRESSION TOOL COLOR: Green/yellow- less yelling out today. Tearful at times  CIWA SCORE: N/A   ABNL VS/O2: VSS on RA  MOBILITY: Total/lift. Baseline WC bound but can stand and take a few steps/pivot per significant other. Up to chair today with lift x2  PAIN MANAGMENT: some leg/back pain at times, declines intervention  DIET: Regular, must sit up right for meals per speech, poor appetite  BOWEL/BLADDER: Incontinent of Bowel and bladder. Using Purewick, pees large amounts at a times. No BM  ABNL LAB/BG: K 3.8  DRAIN/DEVICES: Purewick, no IV access MD aware  TELEMETRY RHYTHM: N/A  SKIN: Lower back/R flank wound dressing CDI, next change 9/6; dry, patty, flaky BLE, +1-2 BLE edema - edemawear on - elevated feet on pillows. Pannus/skin folds improved (no open wounds) still some redness under left breast, shreyas cleanse and antifungal applied. Bilateral buttock small openings- triad paste applied and in crease of buttocks, leave open.   TESTS/PROCEDURES: none this shift  D/C DATE: Pending placement  Discharge Barriers: needs LTC, Behaviors  OTHER IMPORTANT INFO: Per orders, Vitals only twice a day and neuro checks once a day. Neuros intact, BLE weakness.

## 2022-09-05 NOTE — PLAN OF CARE
Goal Outcome Evaluation:                    Summary: CVA R MCA  DATE & TIME: 9/4/22-9/5/22 [id number]  Cognitive Concerns/ Orientation : A&O 2-3, disoriented to time and place/situation sometimes. Waxes and wanes.   BEHAVIOR & AGGRESSION TOOL COLOR: Green/yellow- less yelling out today. Not as tearful but is adament she is not going to LTC/TCU and wants to go home, educated patient with significant other Leo bedside.   CIWA SCORE: N/A   ABNL VS/O2: VSS on RA  MOBILITY: Total/lift. Baseline WC bound but can stand and take a few steps/pivot per significant other. Up to chair today with lift  PAIN MANAGMENT: PRN Oxycodone given x1 for BLE leg pain, removed edemaware per pt request  DIET: Regular, must sit up right for meals per speech, better appetite today but still not good   BOWEL/BLADDER: Incontinent of Bowel and bladder. Using Purewick, pees large amounts at a times (bladder scanned this AM for 177 today, then patient voided). No BM  ABNL LAB/BG: K 3.4, took scheduled dose but refused to drink protocol replacement, next check in AM is ok with MD  DRAIN/DEVICES: Purewick, no IV access MD aware  TELEMETRY RHYTHM: N/A  SKIN: Lower back/R flank wound dressing changed by WOC today, next change 9/6; dry, patty, flaky BLE, +1-2 BLE edema - edemaware off at 0230, pt requested their removal as they were hurting her, pt refused reapplication - elevated feet on pillows. Pannus/skin folds improved (no open wounds) still some redness under left breast, shreyas cleanse and antifungal applied. Bilateral buttock small openings- triad paste applied and in crease of buttocks, leave open.   TESTS/PROCEDURES: none this shift  D/C DATE: Pending placement  Discharge Barriers: needs LTC, Behaviors  OTHER IMPORTANT INFO: Per orders, Vitals only twice a day and neuro checks once a day. Neuros intact, BLE weakness.   Pt would like to sit in chair during day to brush her hair.

## 2022-09-06 NOTE — PLAN OF CARE
Goal Outcome Evaluation:     Cognitive Concerns/ Orientation : A&O&2-3, disoriented to situation. Anxious at times -distraction effective  BEHAVIOR & AGGRESSION TOOL COLOR: yellow  CIWA SCORE: N/A   ABNL VS/O2: VSS on room air  MOBILITY: Total/lift. Baseline WC bound. Up in the recliner x1  PAIN MANAGMENT: generalized tenderness  DIET: Regula-poor appetite  BOWEL/BLADDER: Incontinent of Bowel and bladder. Purewick in place  ABNL LAB/BG:   DRAIN/DEVICES: Purewick  TELEMETRY RHYTHM: N/A  SKIN: Lower back/R flank wound dressing changed due to coming off. BLE patty, flaky  with 2 BLE edema - edemawear on - elevated feet on pillows, skin care completed. Pannus/skin folds improved -scant amount of redness on left abd fold. Redness under left breast-improving. Bilateral buttock small opening-tirad paste applied  TESTS/PROCEDURES: none   D/C DATE: Pending placement  Discharge Barriers: needs LTC  OTHER IMPORTANT INFO: neuros unchanged, up in the recliner. Ativan given prior to PT session

## 2022-09-06 NOTE — PROGRESS NOTES
[facility]    Hospitalist Progress Note    Assessment & Plan   Cristy Bailey is a 77 year old female with a PMHx significant for morbid obesity, hypertension, and hyperlipidemia, who presented to [facility] 7/13/2022 with complete left-sided paralysis, left-sided facial droop, and difficulty speaking. CTA head remarkable for right MCA occlusion. She was given tenecteplase and subsequently transferred to [facility] 7/13/2022 for thrombectomy.      Hospital stay complicated by flank wound which required surgical debridement and placement of wound vac and findings LTC placement.     Acute R MCA ischemic stroke with edema and right to left midline shift  S/p tenecteplase and subsequent R MCA mechanical thrombectomy 7/13/22  * Presented to [facility] 7/13 with complete paralysis, left-sided facial droop, and aphasia. Code stroke initiated. Head CT 7/13 showed signs of an evolving R MCA infarct. CTA head 7/13 showed a right carotid terminus occlusion, right middle cerebral artery M1  segment occlusion with poor opacification of the more distal right MCA branches/poor collateral flow. CTA neck 7/13 negative for acute occlusions. Pt given tenecteplase 7/13 at 13:11. Noted to be agitated and was subsequently intubated given need for procedure.   * Subsequently transferred to [facility] 7/13/2022 where she underwent above procedure.   7/14: Extubated. Head CT 7/14 showed evolution of acute infarct involving the R MCA distribution with increased swelling, cortical effacement, and new mild right-to-left midline shift; questionable hypoattenuation involving the bilateral occipital lobes which could be artifactual.   * Additional stroke workup pursued this stay -- echo 7/14 showed EF 50%, grade 1 diastolic dysfunction   * Started ASA and atorvastatin per stroke team.   * Repeat head CT 7/15 showed evolving R MCA stroke with areas of edema, overall stable.     -- conts on full dose ASA and  statin  -- goal SBP <140/90 and met   -- monitored on telemetry for first several wks of hospital stay without abnl rhythm so can likely defer prior recommendations for 30d cardiac event monitor at discharge  -- will need to follow up with [facility] in 6-8 wks (due beginning of September)     Anxiety   Cognitive impairment, confusion as above.   * As stay has progressed, patient has consistently been oriented x2 (to self, location), unable to state the year. Also noted to have intermittent situational confusion. States family members have been visiting when they have not.   * Ongoing issues with anxiety this stay -- had been on regimen of Seroquel 12.5mg at dinner and 25mg HS with 12.5mg po q6h prn available. Mirtazapine 7.5mg on 8/15 evening given decreased appetite  * Psych consulted for assistance with ongoing mgmt -- seen on 8/16 and meds adjusted. Seroquel HS changed to Abilify 5mg HS. Recommended Zyptexa 5mg q6h prn for breakthrough insomnia/agitation while going off Seroquel. Consider uptitation of Abilify per psych recs. Advised uptitration of sertraline (25mg x3d then increase to 50mg daily beginning 8/20) and Remeron stopped. Also advised to liberalize Ativan to 0.5mg q6h prn.    * Seen by psych on 8/24, Abilify increased from 5mg to 10mg.  * PRN ativan stopped on 08/27/22 as leading to increased confusion. Hydroxyzine stopped  as well.      -- cont current regimen of meds including Abilify 5mg every evening, Zyprexa 2.5mg HS and sertraline 50mg daily  -- cont prns per psych including Zyprexa 5mg q6h prn (last PRN dose given 9/3)      Urinary retention: Resolved  * Retaining urine on 8/14. UA WNL, not worrisome for infection  * Requiring straight cath x1 on 8/20 PM  * Good UOP, no longer requiring straight cath. No evidence of obstruction on PVR.      Dysphagia due to CVA: Resolved  * Seen by SLP and noted with dysphagia. Initially required some intermittent fluid boluses.   * Was eventually able to advance to  regular diet w/thin liquids     Dyslipidemia  * FLP this stay showed tot cholest 163, HDL 47, LDL 91, .   * Started on atorvastatin 40 mg daily     Volume overload dt diastolic CHF exacerbation: Improved  Essential hypertension  Hypokalemia, recurrent on lasix, and often refusing potassium   * Not on/needing meds PTA.   * As above, echo this stay showed EF 50% with grade I diastolic dysfunction; RV not well visualized but appeared mild-moderately dilated with global systolic function probably mildly reduced.   * BPs were initially soft this stay and required IVFs. BPs improved.   * Developed pedal edema required IV Lasix. Ultimately transition to oral Lasix.  * Started on lisinopril this stay  * BPs improved during stay.   * Lasix decreased from 40mg in AM and 20mg in PM to 20mg BID on 9/3 and added spironolactone 25mg daily    -- cont lisinopril 10mg daily  -- cont Lasix 20mg po BID  -- cont spironolactone 25mg daily  -- cont KCl 20mEq po daily     Hypophosphatemia: Resolved     Prolonged QTc   * EKG on 7/13 showed QTc 494.   * Repeat EKG on 7/30 (while on Levaquin) showed QTc table at 475. Has since completed course of abx as below.  * Had been monitoring on telemetry this stay. No concerning findings so tele was ultimately dc'd 8/19     Chronic bilateral LE edema with chronic venous stasis dermatitis and chronic skin changes  Hx of LLE cellulitis  * Was hospitalized in 11/2021 for sepsis dt to pneumonia and LLE cellulitis.   * During this stay, BLE noted to be patty and edematous, no unilateral leg swelling appreciated; LLE had patchy areas of erythema, no fluctuance, no painful areas with palpation; legs warm to touch. Lymphedema consulted.   -- cont LE elevation, lymphedema wraps     Lower back/R flank wound/abscess, suspect dt pressure type injury, s/p surgical debridement on 7/30/22 and placement of wound vac on 8/2/22  OA  Hx of bilateral hip replacements   Hx of chronic neck and back pain  * Pt with  significant back pain early on in hospital stay. Was requiring IV hydromorphone.  Dose had to be decreased dt somnolence.  * MRI L-spine in 2018 showed multilevel degenerative changes with mild central stenosis. Patient has chronic back pain, reports having it over the last 2 years.   * During this stay, patient was noted with 5-6cm by 2-3cm wound with swelling/fluid/blistering in a fold of her lower back, did not appear infected; this seemed to be the source of her pain. Padded dressing placed and WOC RN ordered. Pain initially improved.   * Was placed on course of Augmentin on 7/24.   * On 7/26, was re-evaluated by WOC RN. Wound appeared more erythematous and purulence expressed. Worsening with shear stress from lift. Procal <0.05, WBC WNL. Abx changed to IV clindamycin.  * Wound cultures obtained. On 7/28, wound grew proteus and staph aureus (MRSA neg). US neg for abscess.   * Lost IV access on 7/28 so abx were changed to oral clindamycin and oral levaquin. IV access re-established but was continued on oral abx.  * General surgery consulted, ultimately underwent excisional debridement of R flank abscess in OR on 7/30. Intraop cultures showed polymicrobial growth with proteus mirabilis x2 strains (both pan-sensitive), corynebacterium striatum and enterococcus faecalis.  * Wound vac placed per general surgery on 8/2 >> subsequently removed during stay.  * ID consulted on 8/2 and abx narrowed to Augmentin alone, completed an additional 5 days of treatment on 8/7.     -- wound per WOC RN, follows weekly  -- prns available for pain  -- cont regular repositioning     Pressure injury bilateral buttocks  * Noted by nursing on 09/02/22. WOC RN following as above.      Suspected meningioma left parietal region, incidentally seen on admission CT on 7/13  * Head CT 7/13 showed a calcified extra-axial mass overlying the left parietal region measuring 1.4 cm, potentially representing a meningioma.  * Will need serial  monitoring OP after discharge.      Anemia, suspect dilutional component, RESOLVED  * Hgb normal on admit. Hgb 11.5 on 7/16. No overt clinical signs of major bleeding.  * Hgb now stable at 12-13     Intertriginous dermatitis  * Chronic and stable, cont clotrimazole powder     Constipation  * Continue sched bowel regimen     Morbid obesity  * BMI 59 on admission. Recommend aggressive dietary and lifestyle modifications as condition improves     Suspected sleep apnea  * O2 drop to mid 80s overnight 8/14. Briefly placed on 4LPM, which she then removed and then slept okay with sats in 90s thereafter.  * Recommend sleep study as outpatient     Moderate malnutrition in context of acute illness and chronic disease  * Nutritionist following. Appetite poor this stay, needing encouragement to take po.      COVID-19 vaccination status  * Received first dose of vaccine on 8/28, 2nd dose due 9/18/22    FEN: no IVFs, lytes stable, regular diet w/thin liquids  DVT Prophylaxis: Lovenox 40mg subQ BID  Code Status: Full Code    Disposition: Discharge to LTC facility once placement found. SW following    Darby Martin, DO    Interval History   Was asking for something to help with anxiety prior to working with therapy this afternoon, given 0.25mg Ativan x1. When I saw patient, she was up in the chair. Was yelling out. Said she was in pain, that her butt hurt and that she felt like she was sliding out of the chair. Hard to redirect conversation. No reported cp/sob/cough, abd pain/n/v.     -Data reviewed today: I reviewed all new labs and imaging results over the last 24 hours. I personally reviewed no images or EKG's today.    Physical Exam   Temp: 97.6  F (36.4  C)   BP: 98/53     Resp: 16 SpO2: 96 % O2 Device: None (Room air)    Vitals:    09/03/22 1221 09/04/22 1043 09/05/22 0700   Weight: 142 kg (313 lb) 141.7 kg (312 lb 8 oz) 140 kg (308 lb 9.6 oz)     Vital Signs with Ranges  Temp:  [97.6  F (36.4  C)] 97.6  F (36.4   C)  Resp:  [16] 16  BP: (98)/(53) 98/53  SpO2:  [96 %] 96 %  I/O last 3 completed shifts:  In: 340 [P.O.:340]  Out: 300 [Urine:300]    Constitutional: Resting comfortably, A&O to self/location, cannot state the year (this has been her baseline this stay), anxious appearing but NAD  Respiratory: CTAB, no wheeze/rales/rhonchi, no increased work of breathing  Cardiovascular: HRRR, no MGR, no LE edema  GI: S, NT, ND, +BS  Skin/Integumen: warm/dry  Other:      Medications     - MEDICATION INSTRUCTIONS -         ARIPiprazole  10 mg Oral QPM     aspirin  325 mg Oral Daily     atorvastatin  40 mg Oral QPM     COVID-19 mRNA vacc (PFIZER)  30 mcg Intramuscular Q21 Days     enoxaparin ANTICOAGULANT  40 mg Subcutaneous Q12H     furosemide  20 mg Oral BID     lisinopril  10 mg Oral Daily     miconazole   Topical BID     multivitamin w/minerals  1 tablet Oral Daily     OLANZapine  2.5 mg Oral At Bedtime     polyethylene glycol  17 g Oral Daily     potassium chloride  20 mEq Oral Daily     senna-docusate  1-2 tablet Oral BID     sertraline  50 mg Oral Daily     spironolactone  25 mg Oral Daily       Data   Recent Labs   Lab 09/06/22  1504 09/06/22  0937 09/05/22  0827 09/04/22  0822 09/03/22  1749 09/03/22  1404 09/03/22  1023 09/01/22  1051     --   --   --   --  304  --  279   NA  --  142 141  --   --   --   --   --    POTASSIUM  --  3.8 3.8 3.4   < >  --    < >  --    CHLORIDE  --  104 105  --   --   --   --   --    CO2  --  33* 30  --   --   --   --   --    BUN  --  20 17  --   --   --   --   --    CR  --  0.86 0.69 0.66  --   --   --  0.51*   ANIONGAP  --  5 6  --   --   --   --   --    ASHIA  --  8.6 8.5  --   --   --   --   --    GLC  --  115* 101*  --   --   --   --   --     < > = values in this interval not displayed.       No results found for this or any previous visit (from the past 24 hour(s)).

## 2022-09-06 NOTE — PLAN OF CARE
Goal Outcome Evaluation:                    Summary: CVA R MCA  DATE & TIME: 9/5/22-9/6/22 5978-7503  Cognitive Concerns/ Orientation : A&O 2-3, disoriented to time and place/situation sometimes. Waxes and wanes.   BEHAVIOR & AGGRESSION TOOL COLOR: Green/yellow- less yelling out today. Tearful at times. PRN Zyprexa given x1  CIWA SCORE: N/A   ABNL VS/O2: VSS on RA  MOBILITY: Total/lift. Baseline WC bound but can stand and take a few steps/pivot per significant other.  PAIN MANAGMENT: PRN oxycodone given x1 and ice packs applied for foot/leg pain.  DIET: Regular, must sit up right for meals per speech, poor appetite  BOWEL/BLADDER: Incontinent of Bowel and bladder. Using Purewick, pees large amounts at a times. No BM  ABNL LAB/BG: K 3.8  DRAIN/DEVICES: Purewick, no IV access MD aware  TELEMETRY RHYTHM: N/A  SKIN: Lower back/R flank wound dressing CDI, next change 9/6; dry, patty, flaky BLE, +1-2 BLE edema - edemawear on - elevated feet on pillows. Pannus/skin folds improved (no open wounds) still some redness under left breast, shreyas cleanse and antifungal applied. Bilateral buttock small openings- triad paste applied and in crease of buttocks, leave open.   TESTS/PROCEDURES: none this shift  D/C DATE: Pending placement  Discharge Barriers: needs LTC, Behaviors  OTHER IMPORTANT INFO: Per orders, Vitals only twice a day and neuro checks once a day. Neuros intact, BLE weakness.          Not on any medications, continue diet and exercises, will obtain A1c

## 2022-09-07 NOTE — PLAN OF CARE
Goal Outcome Evaluation:    Cognitive Concerns/ Orientation: A&Ox3, disoriented to situation. Anxious at times, wants staff to stay in the room with her.   BEHAVIOR & AGGRESSION TOOL COLOR: Green  CIWA SCORE: N/A   ABNL VS/O2: VSS on room air  MOBILITY: Total/lift. Baseline WC bound. Not OOB this shift  PAIN MANAGMENT: Generalized tenderness with cares  DIET: Regular-very poor appetite.  BOWEL/BLADDER: Incontinent of bowel and bladder. Purewick discontinued due to possible pressure injury  ABNL LAB/BG: N/A  DRAIN/DEVICES: Purewick patent.  TELEMETRY RHYTHM: N/A  SKIN: BLE patty, flaky with +2 BLE edema, edemawear on cleaned. Elevated feet on pillows. Right waist crease dressing intact-changed yesterday  TESTS/PROCEDURES: N/A  D/C DATE: Tentatively 9/19 pending status of placement referrals.     Declined to get out of bed. Difficulty motivating patient, makes lots of excuses for not participating in cares. WOC consult placed for left inner thigh pressure injury near groin. Purewick no longer used. Refused multiple meds this am-unable to educate -pt refused education

## 2022-09-07 NOTE — PLAN OF CARE
Goal Outcome Evaluation:      Summary: CVA R MCA  DATE & TIME: 9/6/22-9/7/22 7420-3812  Cognitive Concerns/ Orientation: A&Ox3, disoriented to situation. Anxious at times, especially with cares and when bed turned away from door.  BEHAVIOR & AGGRESSION TOOL COLOR: Green  CIWA SCORE: N/A   ABNL VS/O2: VSS on RA  MOBILITY: Total/lift. Baseline WC bound.   PAIN MANAGMENT: Generalized tenderness.  DIET: Regular, poor appetite.  BOWEL/BLADDER: Incontinent of Bowel and bladder. Purewick in place and patent.  ABNL LAB/BG: N/A  DRAIN/DEVICES: Purewick patent.  TELEMETRY RHYTHM: N/A  SKIN: Lower back/R flank wound dressing in place. BLE patty, flaky with +2 BLE edema, edemawear on. Elevated feet on pillows. Scant amount of redness on left abd fold. Redness under left breast.  TESTS/PROCEDURES: N/A  D/C DATE: Tentatively 9/19 pending status of placement referrals.

## 2022-09-07 NOTE — PLAN OF CARE
Goal Outcome Evaluation:    Plan of Care Reviewed With: patient     Overall Patient Progress: no change    Summary: CVA R MCA  DATE & TIME: 9/6/22 6561-2027  Cognitive Concerns/ Orientation: A&Ox3, disoriented to situation. Anxious at times, especially with cares and when bed turned away from door.  BEHAVIOR & AGGRESSION TOOL COLOR: Green  CIWA SCORE: N/A   ABNL VS/O2: VSS on RA  MOBILITY: Total/lift. Baseline WC bound. Up in the recliner x1.  PAIN MANAGMENT: Generalized tenderness.  DIET: Regular, poor appetite.  BOWEL/BLADDER: Incontinent of Bowel and bladder. Purewick in place and patent.  ABNL LAB/BG: N/A  DRAIN/DEVICES: Purewick patent.  TELEMETRY RHYTHM: N/A  SKIN: Lower back/R flank wound dressing in place. BLE patty, flaky with +2 BLE edema, edemawear on. Elevated feet on pillows. Scant amount of redness on left abd fold. Redness under left breast.  TESTS/PROCEDURES: N/A  D/C DATE: Tentatively 9/19 pending status of placement referrals.

## 2022-09-07 NOTE — PLAN OF CARE
Goal Outcome Evaluation:    Plan of Care Reviewed With: patient     Overall Patient Progress: no change    Summary: CVA R MCA  DATE & TIME: 9/7/22 9197-0155  Cognitive Concerns/ Orientation: A&Ox3, disoriented to situation. Anxious at times, especially with cares.  BEHAVIOR & AGGRESSION TOOL COLOR: Green  CIWA SCORE: N/A   ABNL VS/O2: VSS on RA  MOBILITY: Total/lift. Baseline WC bound. Not OOB this shift. T/R q2h  PAIN MANAGMENT: Generalized tenderness with cares.  DIET: Regular-very poor appetite.  BOWEL/BLADDER: Incontinent of bowel and bladder. Purewick discontinued due to possible pressure injury (left inner thigh).  ABNL LAB/BG: N/A  DRAIN/DEVICES: None  TELEMETRY RHYTHM: N/A  SKIN: BLE patty, flaky with +2 BLE edema, edemawear on. Elevated feet on pillows. Right waist crease dressing intact-changed yesterday.  TESTS/PROCEDURES: N/A  D/C DATE: Tentatively 9/19 pending status of placement referrals. WOC consult placed for left inner thigh pressure injury near groin. Purewick no longer used.

## 2022-09-07 NOTE — PLAN OF CARE
Goal Outcome Evaluation:    Plan of Care Reviewed With: patient     Overall Patient Progress: no change    Outcome Evaluation: Continues to eat poorly. Refuses her multivitamin. eating 0-25% at best since last assessment.

## 2022-09-07 NOTE — PROGRESS NOTES
[facility]    Hospitalist Progress Note    Assessment & Plan   Cristy Bailey is a 77 year old female with a PMHx significant for morbid obesity, hypertension, and hyperlipidemia, who presented to [facility] 7/13/2022 with complete left-sided paralysis, left-sided facial droop, and difficulty speaking. CTA head remarkable for right MCA occlusion. She was given tenecteplase and subsequently transferred to [facility] 7/13/2022 for thrombectomy.      Hospital stay complicated by flank wound which required surgical debridement and placement of wound vac and findings LTC placement.     Acute R MCA ischemic stroke with edema and right to left midline shift  S/p tenecteplase and subsequent R MCA mechanical thrombectomy 7/13/22  * Presented to [facility] 7/13 with complete paralysis, left-sided facial droop, and aphasia. Code stroke initiated. Head CT 7/13 showed signs of an evolving R MCA infarct. CTA head 7/13 showed a right carotid terminus occlusion, right middle cerebral artery M1  segment occlusion with poor opacification of the more distal right MCA branches/poor collateral flow. CTA neck 7/13 negative for acute occlusions. Pt given tenecteplase 7/13 at 13:11. Noted to be agitated and was subsequently intubated given need for procedure.   * Subsequently transferred to [facility] 7/13/2022 where she underwent above procedure.   7/14: Extubated. Head CT 7/14 showed evolution of acute infarct involving the R MCA distribution with increased swelling, cortical effacement, and new mild right-to-left midline shift; questionable hypoattenuation involving the bilateral occipital lobes which could be artifactual.   * Additional stroke workup pursued this stay -- echo 7/14 showed EF 50%, grade 1 diastolic dysfunction   * Started ASA and atorvastatin per stroke team.   * Repeat head CT 7/15 showed evolving R MCA stroke with areas of edema, overall stable.     -- conts on full dose ASA and  statin  -- goal SBP <140/90 and met   -- monitored on telemetry for first several wks of hospital stay without abnl rhythm so can likely defer prior recommendations for 30d cardiac event monitor at discharge  -- will need to follow up with [facility] in 6-8 wks (due beginning of September)     Anxiety   Cognitive impairment, confusion as above.   * As stay has progressed, patient has consistently been oriented x2 (to self, location), unable to state the year. Also noted to have intermittent situational confusion. States family members have been visiting when they have not.   * Ongoing issues with anxiety this stay -- had been on regimen of Seroquel 12.5mg at dinner and 25mg HS with 12.5mg po q6h prn available. Mirtazapine 7.5mg on 8/15 evening given decreased appetite  * Psych consulted for assistance with ongoing mgmt -- seen on 8/16 and meds adjusted. Seroquel HS changed to Abilify 5mg HS. Recommended Zyptexa 5mg q6h prn for breakthrough insomnia/agitation while going off Seroquel. Consider uptitation of Abilify per psych recs. Advised uptitration of sertraline (25mg x3d then increase to 50mg daily beginning 8/20) and Remeron stopped. Also advised to liberalize Ativan to 0.5mg q6h prn.    * Seen by psych on 8/24, Abilify increased from 5mg to 10mg.  * PRN ativan stopped on 08/27/22 as leading to increased confusion. Hydroxyzine stopped  as well.      -- cont current regimen of meds including Abilify 5mg every evening, Zyprexa 2.5mg HS and sertraline 50mg daily  -- cont prns per psych including Zyprexa 5mg q6h prn (last PRN dose given 9/3)      Urinary retention: Resolved  * Retaining urine on 8/14. UA WNL, not worrisome for infection  * Requiring straight cath x1 on 8/20 PM  * Good UOP, no longer requiring straight cath. No evidence of obstruction on PVR.      Dysphagia due to CVA: Resolved  * Seen by SLP and noted with dysphagia. Initially required some intermittent fluid boluses.   * Was eventually able to advance to  regular diet w/thin liquids     Dyslipidemia  * FLP this stay showed tot cholest 163, HDL 47, LDL 91, .   * Started on atorvastatin 40 mg daily     Volume overload dt diastolic CHF exacerbation: Improved  Essential hypertension  Hypokalemia, recurrent on lasix, and often refusing potassium   * Not on/needing meds PTA.   * As above, echo this stay showed EF 50% with grade I diastolic dysfunction; RV not well visualized but appeared mild-moderately dilated with global systolic function probably mildly reduced.   * BPs were initially soft this stay and required IVFs. BPs improved.   * Developed pedal edema required IV Lasix. Ultimately transition to oral Lasix.  * Started on lisinopril this stay  * BPs improved during stay.   * Lasix decreased from 40mg in AM and 20mg in PM to 20mg BID on 9/3 and added spironolactone 25mg daily    -- cont lisinopril 10mg daily  -- cont Lasix 20mg po BID  -- cont spironolactone 25mg daily  -- cont KCl 20mEq po daily     Hypophosphatemia: Resolved     Prolonged QTc   * EKG on 7/13 showed QTc 494.   * Repeat EKG on 7/30 (while on Levaquin) showed QTc table at 475. Has since completed course of abx as below.  * Had been monitoring on telemetry this stay. No concerning findings so tele was ultimately dc'd 8/19     Chronic bilateral LE edema with chronic venous stasis dermatitis and chronic skin changes  Hx of LLE cellulitis  * Was hospitalized in 11/2021 for sepsis dt to pneumonia and LLE cellulitis.   * During this stay, BLE noted to be patty and edematous, no unilateral leg swelling appreciated; LLE had patchy areas of erythema, no fluctuance, no painful areas with palpation; legs warm to touch. Lymphedema consulted.   -- cont LE elevation, lymphedema wraps     Lower back/R flank wound/abscess, suspect dt pressure type injury, s/p surgical debridement on 7/30/22 and placement of wound vac on 8/2/22  OA  Hx of bilateral hip replacements   Hx of chronic neck and back pain  * Pt with  significant back pain early on in hospital stay. Was requiring IV hydromorphone.  Dose had to be decreased dt somnolence.  * MRI L-spine in 2018 showed multilevel degenerative changes with mild central stenosis. Patient has chronic back pain, reports having it over the last 2 years.   * During this stay, patient was noted with 5-6cm by 2-3cm wound with swelling/fluid/blistering in a fold of her lower back, did not appear infected; this seemed to be the source of her pain. Padded dressing placed and WOC RN ordered. Pain initially improved.   * Was placed on course of Augmentin on 7/24.   * On 7/26, was re-evaluated by WOC RN. Wound appeared more erythematous and purulence expressed. Worsening with shear stress from lift. Procal <0.05, WBC WNL. Abx changed to IV clindamycin.  * Wound cultures obtained. On 7/28, wound grew proteus and staph aureus (MRSA neg). US neg for abscess.   * Lost IV access on 7/28 so abx were changed to oral clindamycin and oral levaquin. IV access re-established but was continued on oral abx.  * General surgery consulted, ultimately underwent excisional debridement of R flank abscess in OR on 7/30. Intraop cultures showed polymicrobial growth with proteus mirabilis x2 strains (both pan-sensitive), corynebacterium striatum and enterococcus faecalis.  * Wound vac placed per general surgery on 8/2 >> subsequently removed during stay.  * ID consulted on 8/2 and abx narrowed to Augmentin alone, completed an additional 5 days of treatment on 8/7.     -- wound per WOC RN, follows weekly  -- prns available for pain  -- cont regular repositioning     Pressure injury bilateral buttocks  * Noted by nursing on 09/02/22. WOC RN following as above.      Suspected meningioma left parietal region, incidentally seen on admission CT on 7/13  * Head CT 7/13 showed a calcified extra-axial mass overlying the left parietal region measuring 1.4 cm, potentially representing a meningioma.  * Will need serial  monitoring OP after discharge.      Anemia, suspect dilutional component: Resolved  * Hgb normal on admit. Hgb 11.5 on 7/16. No overt clinical signs of major bleeding.  * Hgb now stable at 12-13      ntertriginous dermatitis  * Chronic and stable, cont clotrimazole powder     Constipation  * Continue sched bowel regimen     Morbid obesity  * BMI 59 on admission. Recommend aggressive dietary and lifestyle modifications as condition improves     Suspected sleep apnea  * O2 drop to mid 80s overnight 8/14. Briefly placed on 4LPM, which she then removed and then slept okay with sats in 90s thereafter.  * Recommend sleep study as outpatient     Moderate malnutrition in context of acute illness and chronic disease  * Nutritionist following. Appetite poor this stay, needing encouragement to take po.      COVID-19 vaccination status  * Received first dose of vaccine on 8/28, 2nd dose due 9/18/22    FEN: no IVFs, lytes stable, regular diet w/thin liquids  DVT Prophylaxis: Lovenox 40mg subQ BID  Code Status: Full Code    Disposition: Discharge to LTC facility once placement found. SW following    Darby Martin, DO    Interval History    Seen this morning. Calm and alert. Feeling okay. Denies complaints, including cp/sob/cough, abd pain/n/v and flank pain. Seems to be in good spirits. Waiting for breakfast to arrive.     -Data reviewed today: I reviewed all new labs and imaging results over the last 24 hours. I personally reviewed no images or EKG's today.    Physical Exam   Temp: 98.2  F (36.8  C) Temp src: Oral BP: 102/54 Pulse: 79   Resp: 16 SpO2: 91 % O2 Device: None (Room air)    Vitals:    09/03/22 1221 09/04/22 1043 09/05/22 0700   Weight: 142 kg (313 lb) 141.7 kg (312 lb 8 oz) 140 kg (308 lb 9.6 oz)     Vital Signs with Ranges  Temp:  [97.6  F (36.4  C)-98.4  F (36.9  C)] 98.2  F (36.8  C)  Pulse:  [73-79] 79  Resp:  [16-18] 16  BP: ()/(48-54) 102/54  SpO2:  [91 %-96 %] 91 %  I/O last 3 completed  shifts:  In: 240 [P.O.:240]  Out: 100 [Urine:100]    Constitutional: Resting comfortably, alert and answering basic questions appropriately, NAD  Respiratory: CTAB, no wheeze/rales/rhonchi, no increased work of breathing  Cardiovascular: HRRR, no MGR, no LE edema  GI: S, NT, ND, +BS  Skin/Integumen: warm/dry  Other:      Medications     - MEDICATION INSTRUCTIONS -         ARIPiprazole  10 mg Oral QPM     aspirin  325 mg Oral Daily     atorvastatin  40 mg Oral QPM     COVID-19 mRNA vacc (PFIZER)  30 mcg Intramuscular Q21 Days     enoxaparin ANTICOAGULANT  40 mg Subcutaneous Q12H     furosemide  20 mg Oral BID     lisinopril  10 mg Oral Daily     miconazole   Topical BID     multivitamin w/minerals  1 tablet Oral Daily     OLANZapine  2.5 mg Oral At Bedtime     polyethylene glycol  17 g Oral Daily     potassium chloride  20 mEq Oral Daily     senna-docusate  1-2 tablet Oral BID     sertraline  50 mg Oral Daily     spironolactone  25 mg Oral Daily       Data   Recent Labs   Lab 09/06/22  1504 09/06/22  0937 09/05/22  0827 09/04/22  0822 09/03/22  1749 09/03/22  1404 09/03/22  1023 09/01/22  1051     --   --   --   --  304  --  279   NA  --  142 141  --   --   --   --   --    POTASSIUM  --  3.8 3.8 3.4   < >  --    < >  --    CHLORIDE  --  104 105  --   --   --   --   --    CO2  --  33* 30  --   --   --   --   --    BUN  --  20 17  --   --   --   --   --    CR  --  0.86 0.69 0.66  --   --   --  0.51*   ANIONGAP  --  5 6  --   --   --   --   --    ASHIA  --  8.6 8.5  --   --   --   --   --    GLC  --  115* 101*  --   --   --   --   --     < > = values in this interval not displayed.       No results found for this or any previous visit (from the past 24 hour(s)).

## 2022-09-07 NOTE — PROGRESS NOTES
CLINICAL NUTRITION SERVICES - REASSESSMENT NOTE    RECOMMENDATIONS FOR MD/PROVIDER TO ORDER: \  - Consider nutrition support given ongoing very very poor PO (0-25%, often refuses meals, supplements not accepted by patient)    Future/Additional Recommendations:   Continue encouragement for Thera vit M daily  Room service w/ assist    Malnutrition: (8/31)   % Weight Loss:  > 5% in 1 month (severe malnutrition) - significant loss over this admit  % Intake:  </= 50% for >/= 5 days (severe malnutrition) - consistent this admit x49 days  Subcutaneous Fat Loss:  Orbital region moderate depletion - visual, per 8/16 note  Muscle Loss:  Temporal region moderate depletion - visual, per 8/16 note  Fluid Retention:  Mild generalized edema     Malnutrition Diagnosis: Severe malnutrition  In Context of:  Acute illness or injury  Chronic illness or disease     EVALUATION OF PROGRESS TOWARD GOALS   Diet: Regular diet + Thin liquids  Intake/Tolerance:   - Patient seen in room. She could not recall what or how much she ate for breakfast. Thinks that she ate breakfast at least. Feeling tired this morning.   - continues eating 0-25% of meals, often refusing, or only eating bites. Had not liked any supplement tried this admission.     - Stooling: BM x1 on 9/4. None since.   - Weight: Down to 140 kg as of 9/5 - lowest of admission. Significant wt loss this over the past month in setting of poor oral intakes.   - Labs: reviewed.     - Meds: Thera vit M daily (pt has been refusing daily), Potassium replacement (pt refused), Lasix 20 mg BID, Miralax daily    ASSESSED NUTRITION NEEDS:  Dosing Weight: 89.7 kg (adjusted)  Estimated Energy Needs: 5355-7215 kcals (15-20 Kcal/Kg)  Justification: maintenance r/t BMI  Estimated Protein Needs: 108-135 grams protein (1.2-1.5 g pro/Kg)  Justification: wound healing, obesity guidelines, preservation of lean body mass and increased needs r/t age  Estimated Fluid Needs: (1  mL/Kcal)  Justification: maintenance and per provider pending fluid status*    NEW FINDINGS:   - awaiting discharge   - 9/4: WOCN update.     Previous Goals:   Patient to consume >50% of nutritionally adequate meals TID over next 5-7 days  Evaluation: Not met    Previous Nutrition Diagnosis:   Increased nutrient needs (protein) related to higher demand for healing as evidenced by pt with noted stage 3 PI  Evaluation: No change - update below     CURRENT NUTRITION DIAGNOSIS  Inadequate oral intake related to poor appetite, disinterest in eating, and increased protein needs for wound healing as evidenced by 0-25% of meals consistently over 56 day admission.     INTERVENTIONS  Recommendations / Nutrition Prescription  Continue regular diet  Continue encouragement for Thera vit M daily  Consider nutrition support given ongoing very very poor PO (0-25%, often refuses meals, supplements not accepted by patient)     Implementation  None new    Goals  Patient to consume >50% of nutritionally adequate meals TID over next 5-7 days vs start nutrition support.     MONITORING AND EVALUATION:  Progress towards goals will be monitored and evaluated per protocol and Practice Guidelines    Nicole Mitchell RD, LD  [facility], [facility], [facility]  Pager: [phone]  Weekend Pager: [phone]

## 2022-09-08 NOTE — PROGRESS NOTES
[facility]    Hospitalist Progress Note    Assessment & Plan   Cristy Bailey is a 77 year old female with a PMHx significant for morbid obesity, hypertension, and hyperlipidemia, who presented to [facility] 7/13/2022 with complete left-sided paralysis, left-sided facial droop, and difficulty speaking. CTA head remarkable for right MCA occlusion. She was given tenecteplase and subsequently transferred to [facility] 7/13/2022 for thrombectomy.      Hospital stay complicated by flank wound which required surgical debridement and placement of wound vac and findings LTC placement.     Acute R MCA ischemic stroke with edema and right to left midline shift  S/p tenecteplase and subsequent R MCA mechanical thrombectomy 7/13/22  * Presented to [facility] 7/13 with complete paralysis, left-sided facial droop, and aphasia. Code stroke initiated. Head CT 7/13 showed signs of an evolving R MCA infarct. CTA head 7/13 showed a right carotid terminus occlusion, right middle cerebral artery M1  segment occlusion with poor opacification of the more distal right MCA branches/poor collateral flow. CTA neck 7/13 negative for acute occlusions. Pt given tenecteplase 7/13 at 13:11. Noted to be agitated and was subsequently intubated given need for procedure.   * Subsequently transferred to [facility] 7/13/2022 where she underwent above procedure.   7/14: Extubated. Head CT 7/14 showed evolution of acute infarct involving the R MCA distribution with increased swelling, cortical effacement, and new mild right-to-left midline shift; questionable hypoattenuation involving the bilateral occipital lobes which could be artifactual.   * Additional stroke workup pursued this stay -- echo 7/14 showed EF 50%, grade 1 diastolic dysfunction   * Started ASA and atorvastatin per stroke team.   * Repeat head CT 7/15 showed evolving R MCA stroke with areas of edema, overall stable.     -- conts on full dose ASA and  statin  -- goal SBP <140/90 and met   -- monitored on telemetry for first several wks of hospital stay without abnl rhythm so can likely defer prior recommendations for 30d cardiac event monitor at discharge  -- will need to follow up with [facility] in 6-8 wks (due beginning of September)     Anxiety   Cognitive impairment, confusion as above.   * As stay has progressed, patient has consistently been oriented x2 (to self, location), unable to state the year. Also noted to have intermittent situational confusion. States family members have been visiting when they have not.   * Ongoing issues with anxiety this stay -- had been on regimen of Seroquel 12.5mg at dinner and 25mg HS with 12.5mg po q6h prn available. Mirtazapine 7.5mg on 8/15 evening given decreased appetite  * Psych consulted for assistance with ongoing mgmt -- seen on 8/16 and meds adjusted. Seroquel HS changed to Abilify 5mg HS. Recommended Zyptexa 5mg q6h prn for breakthrough insomnia/agitation while going off Seroquel. Consider uptitation of Abilify per psych recs. Advised uptitration of sertraline (25mg x3d then increase to 50mg daily beginning 8/20) and Remeron stopped. Also advised to liberalize Ativan to 0.5mg q6h prn.    * Seen by psych on 8/24, Abilify increased from 5mg to 10mg.  * PRN ativan stopped on 08/27/22 as leading to increased confusion. Hydroxyzine stopped  as well.      -- cont current regimen of meds including Abilify 5mg every evening, Zyprexa 2.5mg HS and sertraline 50mg daily  -- cont prns per psych including Zyprexa 5mg q6h prn     Urinary retention: Resolved  * Retaining urine on 8/14. UA WNL, not worrisome for infection  * Requiring straight cath x1 on 8/20 PM  * Good UOP, no longer requiring straight cath. No evidence of obstruction on PVR.      Dysphagia due to CVA: Resolved  * Seen by SLP and noted with dysphagia. Initially required some intermittent fluid boluses.   * Was eventually able to advance to regular diet w/thin  liquids     Dyslipidemia  * FLP this stay showed tot cholest 163, HDL 47, LDL 91, .   * Started on atorvastatin 40 mg daily     Volume overload dt diastolic CHF exacerbation: Improved  Essential hypertension  Hypokalemia, recurrent on lasix, and often refusing potassium   * Not on/needing meds PTA.   * As above, echo this stay showed EF 50% with grade I diastolic dysfunction; RV not well visualized but appeared mild-moderately dilated with global systolic function probably mildly reduced.   * BPs were initially soft this stay and required IVFs. BPs improved.   * Developed pedal edema required IV Lasix. Ultimately transition to oral Lasix.  * Started on lisinopril this stay  * BPs improved during stay.   * Lasix decreased from 40mg in AM and 20mg in PM to 20mg BID on 9/3 and added spironolactone 25mg daily    -- cont lisinopril 10mg daily  -- cont Lasix 20mg po BID  -- cont spironolactone 25mg daily  -- cont KCl 20mEq po daily     Hypophosphatemia: Resolved     Prolonged QTc   * EKG on 7/13 showed QTc 494.   * Repeat EKG on 7/30 (while on Levaquin) showed QTc table at 475. Has since completed course of abx as below.  * Had been monitoring on telemetry this stay. No concerning findings so tele was ultimately dc'd 8/19     Chronic bilateral LE edema with chronic venous stasis dermatitis and chronic skin changes  Hx of LLE cellulitis  * Was hospitalized in 11/2021 for sepsis dt to pneumonia and LLE cellulitis.   * During this stay, BLE noted to be patty and edematous, no unilateral leg swelling appreciated; LLE had patchy areas of erythema, no fluctuance, no painful areas with palpation; legs warm to touch. Lymphedema consulted.   -- cont LE elevation, lymphedema wraps     Lower back/R flank wound/abscess, suspect dt pressure type injury, s/p surgical debridement on 7/30/22 and placement of wound vac on 8/2/22  OA  Hx of bilateral hip replacements   Hx of chronic neck and back pain  * Pt with significant back pain  early on in hospital stay. Was requiring IV hydromorphone.  Dose had to be decreased dt somnolence.  * MRI L-spine in 2018 showed multilevel degenerative changes with mild central stenosis. Patient has chronic back pain, reports having it over the last 2 years.   * During this stay, patient was noted with 5-6cm by 2-3cm wound with swelling/fluid/blistering in a fold of her lower back, did not appear infected; this seemed to be the source of her pain. Padded dressing placed and WOC RN ordered. Pain initially improved.   * Was placed on course of Augmentin on 7/24.   * On 7/26, was re-evaluated by WOC RN. Wound appeared more erythematous and purulence expressed. Worsening with shear stress from lift. Procal <0.05, WBC WNL. Abx changed to IV clindamycin.  * Wound cultures obtained. On 7/28, wound grew proteus and staph aureus (MRSA neg). US neg for abscess.   * Lost IV access on 7/28 so abx were changed to oral clindamycin and oral levaquin. IV access re-established but was continued on oral abx.  * General surgery consulted, ultimately underwent excisional debridement of R flank abscess in OR on 7/30. Intraop cultures showed polymicrobial growth with proteus mirabilis x2 strains (both pan-sensitive), corynebacterium striatum and enterococcus faecalis.  * Wound vac placed per general surgery on 8/2 >> subsequently removed during stay.  * ID consulted on 8/2 and abx narrowed to Augmentin alone, completed an additional 5 days of treatment on 8/7.     -- wound per WOC RN, follows weekly  -- prns available for pain  -- cont regular repositioning     Pressure injury bilateral buttocks  * Noted by nursing on 09/02/22. WOC RN following as above.      Suspected meningioma left parietal region, incidentally seen on admission CT on 7/13  * Head CT 7/13 showed a calcified extra-axial mass overlying the left parietal region measuring 1.4 cm, potentially representing a meningioma.  * Will need serial monitoring OP after discharge.       Anemia, suspect dilutional component: Resolved  * Hgb normal on admit. Hgb 11.5 on 7/16. No overt clinical signs of major bleeding.  * Hgb now stable at 12-13      ntertriginous dermatitis  * Chronic and stable, cont clotrimazole powder     Constipation  * Continue sched bowel regimen     Morbid obesity  * BMI 59 on admission. Recommend aggressive dietary and lifestyle modifications as condition improves     Suspected sleep apnea  * O2 drop to mid 80s overnight 8/14. Briefly placed on 4LPM, which she then removed and then slept okay with sats in 90s thereafter.  * Recommend sleep study as outpatient     Moderate malnutrition in context of acute illness and chronic disease  * Nutritionist following. Appetite poor this stay, needing encouragement to take po.      COVID-19 vaccination status  * Received first dose of vaccine on 8/28, 2nd dose due 9/18/22    FEN: no IVFs, lytes stable, regular diet w/thin liquids  DVT Prophylaxis: Lovenox 40mg subQ BID  Code Status: Full Code    Disposition: Discharge to LTC facility once placement found. SW following    Darby Martin DO    Interval History    Uneventful night. Seen this afternoon after dressing change and repositioning. Was sleeping quietly, did not wake during my visit. Discussed with bedside RN -- no change in condition, no reported cp/sob/cough, abd pain/n/v.     -Data reviewed today: I reviewed all new labs and imaging results over the last 24 hours. I personally reviewed no images or EKG's today.    Physical Exam   Temp: (!) 96.2  F (35.7  C) Temp src: Axillary BP: 106/46 Pulse: 67   Resp: 16 SpO2: 92 % O2 Device: None (Room air)    Vitals:    09/04/22 1043 09/05/22 0700 09/08/22 0501   Weight: 141.7 kg (312 lb 8 oz) 140 kg (308 lb 9.6 oz) 138.9 kg (306 lb 3.5 oz)     Vital Signs with Ranges  Temp:  [96.2  F (35.7  C)-97.6  F (36.4  C)] 96.2  F (35.7  C)  Pulse:  [67-73] 67  Resp:  [16] 16  BP: (106)/(46-55) 106/46  SpO2:  [92 %-96 %] 92 %  I/O  last 3 completed shifts:  In: 300 [P.O.:300]  Out: 200 [Urine:200]    Constitutional: Resting comfortably, NAD  Respiratory: CTAB, no wheeze/rales/rhonchi, no increased work of breathing  Cardiovascular: HRRR, no MGR, no LE edema  GI: S, NT, ND, +BS   Skin/Integumen: warm/dry  Other:      Medications     - MEDICATION INSTRUCTIONS -         ARIPiprazole  10 mg Oral QPM     aspirin  325 mg Oral Daily     atorvastatin  40 mg Oral QPM     COVID-19 mRNA vacc (PFIZER)  30 mcg Intramuscular Q21 Days     enoxaparin ANTICOAGULANT  40 mg Subcutaneous Q12H     furosemide  20 mg Oral BID     lisinopril  10 mg Oral Daily     miconazole   Topical BID     multivitamin w/minerals  1 tablet Oral Daily     OLANZapine  2.5 mg Oral At Bedtime     polyethylene glycol  17 g Oral Daily     potassium chloride  20 mEq Oral Daily     senna-docusate  1-2 tablet Oral BID     sertraline  50 mg Oral Daily     spironolactone  25 mg Oral Daily       Data   Recent Labs   Lab 09/08/22  0907 09/07/22  0942 09/06/22  1504 09/06/22  0937 09/05/22  0827 09/04/22  0822 09/03/22  1749 09/03/22  1404     --  313  --   --   --   --  304   NA  --   --   --  142 141  --   --   --    POTASSIUM  --   --   --  3.8 3.8 3.4   < >  --    CHLORIDE  --   --   --  104 105  --   --   --    CO2  --   --   --  33* 30  --   --   --    BUN  --   --   --  20 17  --   --   --    CR  --  0.73  --  0.86 0.69 0.66   < >  --    ANIONGAP  --   --   --  5 6  --   --   --    ASHIA  --   --   --  8.6 8.5  --   --   --    GLC  --   --   --  115* 101*  --   --   --     < > = values in this interval not displayed.       No results found for this or any previous visit (from the past 24 hour(s)).

## 2022-09-08 NOTE — PLAN OF CARE
Goal Outcome Evaluation:      Cognitive Concerns/ Orientation: A&Ox3, disoriented to situation. Anxious at times, especially with cares.  BEHAVIOR & AGGRESSION TOOL COLOR: Green  CIWA SCORE: N/A   ABNL VS/O2: VSS on RA  MOBILITY: Total/lift. Baseline WC bound. Not OOB this shift. T/R q2h  PAIN MANAGMENT: Generalized tenderness with cares.  DIET: Regular-very poor appetite.  BOWEL/BLADDER: Incontinent of bowel and bladder. Purewick discontinued due to possible pressure injury (left inner thigh).  ABNL LAB/BG: N/A  DRAIN/DEVICES: None  TELEMETRY RHYTHM: N/A  SKIN: BLE patty, flaky with +2 BLE edema, edemawear on. Elevated feet on pillows. TESTS/PROCEDURES: N/A  D/C DATE: Tentatively 9/19 pending status of placement referrals. WOC consult placed for left inner thigh pressure injury near groin. Purewick no longer used.ALL THE DRESSING CHANGED. Pt sat at the edge of the bed today.BM x2 today

## 2022-09-08 NOTE — PLAN OF CARE
Goal Outcome Evaluation:    Summary: CVA R MCA  DATE & TIME: 9/7/22-9/8/22 2531-2972  Cognitive Concerns/ Orientation: A&Ox3, disoriented to situation. Anxious at times, especially with cares.  BEHAVIOR & AGGRESSION TOOL COLOR: Green  CIWA SCORE: N/A   ABNL VS/O2: VSS on RA  MOBILITY: Total/lift. Baseline WC bound. Not OOB this shift. T/R q2h  PAIN MANAGMENT: Generalized tenderness with cares.  DIET: Regular-very poor appetite.  BOWEL/BLADDER: Incontinent of bowel and bladder. Purewick discontinued due to possible pressure injury (left inner thigh).  ABNL LAB/BG: N/A  DRAIN/DEVICES: None  TELEMETRY RHYTHM: N/A  SKIN: BLE patty, flaky with +2 BLE edema, edemawear on. Elevated feet on pillows. Right waist crease dressing intact-changed yesterday.  TESTS/PROCEDURES: N/A  D/C DATE: Tentatively 9/19 pending status of placement referrals. WOC consult placed for left inner thigh pressure injury near groin. Purewick no longer used.

## 2022-09-08 NOTE — PROGRESS NOTES
Care Management Follow Up    Length of Stay (days): 57    Expected Discharge Date: 09/19/2022     Concerns to be Addressed:       Patient plan of care discussed at interdisciplinary rounds: Yes    Anticipated Discharge Disposition: LTC     Anticipated Discharge Services:  LTC  Anticipated Discharge DME:  None    Patient/family educated on Medicare website which has current facility and service quality ratings:  yes  Education Provided on the Discharge Plan:  yes  Patient/Family in Agreement with the Plan:  yes    Referrals Placed by CM/SW:    Private pay costs discussed: Not applicable    Additional Information:  Writer reviewed the bariatric/complex medical LTC list. Writer sent more LTC referrals via DOD to [facility], [facility], [facility], [facility], [facility], [facility], [facility], [facility], [facility], and [facility].     Melodie Cooper declined patient for not having a LTC bed. She did say once patient gets her second vaccine (after two weeks of first, then she could go into semi-private room). She said if she only has one right now, then she would need a private bed. Patient would be able to get second vaccine on 9/12 if patient received the two dose covid vaccine.    Will continue to follow.    JOSE GUADALUPE Ruffin

## 2022-09-09 NOTE — PLAN OF CARE
"Goal Outcome Evaluation:    Plan of Care Reviewed With: patient     Overall Patient Progress: no change     A&O x 2, confused. VSS , RA. CMS intact. Pain managed with oxy.  Refused morning meds, educated pt on the importance of taking her med. PT stated \"these meds are making me sick\", notified provider. Up with 2 and lift. Multiple wounds, dressing on R-lower back CDI. Legs elevated on pillow. Pending placement.       "

## 2022-09-09 NOTE — PROGRESS NOTES
[facility]  WO Nurse Inpatient Assessment     Consulted for: Right lower back wound (Stage 3 HAPI) and NEW Left medial thigh wound (DTPI HAPI)       Areas Assessed:      Areas visualized during today's visit: right skin fold     Wound location: Right lower back in waist crease                  Correct date , photo labeled         Wound due to: Hospital Acquired Pressure injury stage 3   Stage: Unstageable , Following surgical debridement  Stage 3 Pressure Injury and Moisture Associated Skin Damage (MASD), suspect intertriginous pressure, appears to be deeper tissue damage within a crease, possible shear component from lift   Wound history/plan of care: aquacel ag and sacral mepilex will increase dressing frequency to decrease hypergranulation  Wound base: granulation, no more hypergranulation.      Palpation of the wound bed: normal       Drainage: moderate      Description of drainage: serosanguinous      Measurements (length x width x depth, in cm) 2 x 14.5 x 0.3cm no changes     Tunneling N/A      Undermining NA  Periwound skin: necrotic tissue lifting, only one small (less than 0.5cm x 1cm) at Left lower edge of wound      Color: pink       Temperature: normal to warm and sweaty  Odor: none    Pain: none   Pain intervention prior to dressing change: none needed   Treatment goal: Heal  and decrease hypergranulation  STATUS: improving   Supplies ordered: at bedside     Skin Injury Location: BL inner buttock    Last photo: none taken  Skin injury due to: Healed  Skin history and plan of care:   Pt with deep ray cleft and large buttock. Pt more alert and reports itching to buttock from time to time  Affected area:      Skin assessment: Intact     Measurements (length x width x depth, in cm) approx 1cm linear scratch to BL inner buttock     Color: normal and consistent with surrounding tissue     Temperature  normal      Drainage: none .      Color: none       "Odor: none  Pain: denies , none  Pain interventions prior to dressing change: N/A  Treatment goal: Heal   STATUS: healed  Supplies ordered: at bedside     Pressure Injury Location: Left Medial thigh/groin    Last photo: 9/8        Wound type: Pressure Injury     Pressure Injury Stage: Deep Tissue Pressure Injury (DTPI), hospital acquired      This is a Medical Device Related Pressure Injury (MDRPI) due to purewick  Wound history/plan of care:   Pt has had long hospital course and unable to get up to the bathroom. Pt has very large heavy legs and difficulty for pt to move independently. Pt reports pain to Left groin/thigh    Wound base: 100 % non-blanchable and maroon erythema     Palpation of the wound bed: normal      Drainage: none     Description of drainage: none     Measurements (length x width x depth, in cm) 0.7  x 9.5  x  0 cm      Tunneling N/A     Undermining N/A  Periwound skin: Intact and Erythema- blanchable      Color: pink      Temperature: normal   Odor: none  Pain: moderate, tender  Pain intervention prior to dressing change: N/A  Treatment goal: Heal  and Protection  STATUS: initial assessment  Supplies ordered: at bedside and supplies stored on unit    Treatment Plan:     Posterior waist crease: Every other day Even Days  1. Remove dressings and discard. Wet Aquacel AG to ease removal if dry still.   2. Cleanse wound and periwound skin with Vashe ( #[id number]) solution and 4x4\" gauze, pat dry   3. Apply 3M No Sting barrier wipe to periwound skin, let dry   4. Apply Dry Aquacel Ag (from unit closet) into wound bed, then cover with sacral mepilex dressing + a 4x4\" mepilex to cover whole area      Left Inner thigh/groin: Every other day and PRN  1. Clean wound with saline or MicroKlenz Spray, pat dry  2. Wipe / \"clean\" the surrounding periwound tissue with skin prep (Cavilon No Sting Skin Prep #[id number]) and allow to dry. This will help protect periwound and help dressing adherence  3. Press a Mepilex  " Sacral Dressing (PS#[id number])    to the area, making sure to conform nicely to skin curvatures.   4. Time and date dressing change  NOTE  -Reposition pt side to side ever when in bed, every 2 hours-get the pt way over on side to completely offload pressure. This will benefit skin and respiratory function   -Keep heels elevated and floating on pillows at all times. Try using at least 2 pillows under each calf.  -When up to the chair pt needs to fully offload every 2 hours and use a chair cushion if needed         Orders: Written and Updated    RECOMMEND PRIMARY TEAM ORDER: None, at this time  Education provided: importance of repositioning, plan of care and Off-loading pressure  Discussed plan of care with: Patient and Nurse  WOC nurse follow-up plan: twice weekly  Notify WOC if wound(s) deteriorate.  Nursing to notify the Provider(s) and re-consult the WOC Nurse if new skin concern.    DATA:     Current support surface: Bariatric Low air loss mattress    Containment of urine/stool: Incontinence Protocol and Incontinent pad in bed  BMI: Body mass index is 49.42 kg/m .   Active diet order: Orders Placed This Encounter      Advance Diet as Tolerated      Regular Diet Adult Thin Liquids (level 0) (Upright position, alert, and assist as needed)     Output: I/O last 3 completed shifts:  In: 330 [P.O.:330]  Out: -      Labs:   No lab results found in last 7 days.    Invalid input(s): GLUCOMBO  Pressure injury risk assessment:   Sensory Perception: 3-->slightly limited  Moisture: 3-->occasionally moist  Activity: 2-->chairfast  Mobility: 3-->slightly limited  Nutrition: 1-->very poor  Friction and Shear: 2-->potential problem  Demetrio Score: 14    Telly Meyer RN CWOCN   Dept. Pager: [phone]  Dept. Office Number: [phone]

## 2022-09-09 NOTE — PROGRESS NOTES
[facility]    Hospitalist Progress Note    Assessment & Plan   Cristy Bailey is a 77 year old female with a PMHx significant for morbid obesity, hypertension, and hyperlipidemia, who presented to [facility] 7/13/2022 with complete left-sided paralysis, left-sided facial droop, and difficulty speaking. CTA head remarkable for right MCA occlusion. She was given tenecteplase and subsequently transferred to [facility] 7/13/2022 for thrombectomy.      Hospital stay complicated by flank wound which required surgical debridement and placement of wound vac and findings LTC placement.     Acute R MCA ischemic stroke with edema and right to left midline shift  S/p tenecteplase and subsequent R MCA mechanical thrombectomy 7/13/22  * Presented to [facility] 7/13 with complete paralysis, left-sided facial droop, and aphasia. Code stroke initiated. Head CT 7/13 showed signs of an evolving R MCA infarct. CTA head 7/13 showed a right carotid terminus occlusion, right middle cerebral artery M1  segment occlusion with poor opacification of the more distal right MCA branches/poor collateral flow. CTA neck 7/13 negative for acute occlusions. Pt given tenecteplase 7/13 at 13:11. Noted to be agitated and was subsequently intubated given need for procedure.   * Subsequently transferred to [facility] 7/13/2022 where she underwent above procedure.   7/14: Extubated. Head CT 7/14 showed evolution of acute infarct involving the R MCA distribution with increased swelling, cortical effacement, and new mild right-to-left midline shift; questionable hypoattenuation involving the bilateral occipital lobes which could be artifactual.   * Additional stroke workup pursued this stay -- echo 7/14 showed EF 50%, grade 1 diastolic dysfunction   * Started ASA and atorvastatin per stroke team.   * Repeat head CT 7/15 showed evolving R MCA stroke with areas of edema, overall stable.     -- conts on full dose ASA and  statin  -- goal SBP <140/90 and met   -- monitored on telemetry for first several wks of hospital stay without abnl rhythm so can likely defer prior recommendations for 30d cardiac event monitor at discharge  -- will need to follow up with [facility] in 6-8 wks (due beginning of September)     Anxiety   Cognitive impairment, confusion as above.   * As stay has progressed, patient has consistently been oriented x2 (to self, location), unable to state the year. Also noted to have intermittent situational confusion. States family members have been visiting when they have not.   * Ongoing issues with anxiety this stay -- had been on regimen of Seroquel 12.5mg at dinner and 25mg HS with 12.5mg po q6h prn available. Mirtazapine 7.5mg on 8/15 evening given decreased appetite  * Psych consulted for assistance with ongoing mgmt -- seen on 8/16 and meds adjusted. Seroquel HS changed to Abilify 5mg HS. Recommended Zyptexa 5mg q6h prn for breakthrough insomnia/agitation while going off Seroquel. Consider uptitation of Abilify per psych recs. Advised uptitration of sertraline (25mg x3d then increase to 50mg daily beginning 8/20) and Remeron stopped. Also advised to liberalize Ativan to 0.5mg q6h prn.    * Seen by psych on 8/24, Abilify increased from 5mg to 10mg.  * PRN ativan stopped on 08/27/22 as leading to increased confusion. Hydroxyzine stopped  as well.      -- cont current regimen of meds including Abilify 5mg every evening, Zyprexa 2.5mg HS and sertraline 50mg daily  -- cont prns per psych including Zyprexa 5mg q6h prn     Urinary retention: Resolved  * Retaining urine on 8/14. UA WNL, not worrisome for infection  * Requiring straight cath x1 on 8/20 PM  * Good UOP, no longer requiring straight cath. No evidence of obstruction on PVR.      Dysphagia due to CVA: Resolved  * Seen by SLP and noted with dysphagia. Initially required some intermittent fluid boluses.   * Was eventually able to advance to regular diet w/thin  liquids     Dyslipidemia  * FLP this stay showed tot cholest 163, HDL 47, LDL 91, .   * Started on atorvastatin 40 mg daily     Volume overload dt diastolic CHF exacerbation: Improved  Essential hypertension  Hypokalemia, recurrent on lasix, and often refusing potassium   * Not on/needing meds PTA.   * As above, echo this stay showed EF 50% with grade I diastolic dysfunction; RV not well visualized but appeared mild-moderately dilated with global systolic function probably mildly reduced.   * BPs were initially soft this stay and required IVFs. BPs improved.   * Developed pedal edema required IV Lasix. Ultimately transition to oral Lasix.  * Started on lisinopril this stay  * BPs improved during stay.   * Lasix decreased from 40mg in AM and 20mg in PM to 20mg BID on 9/3 and added spironolactone 25mg daily    -- cont lisinopril 10mg daily  -- cont Lasix 20mg po BID  -- cont spironolactone 25mg daily  -- cont KCl 20mEq po daily     Hypophosphatemia: Resolved     Prolonged QTc   * EKG on 7/13 showed QTc 494.   * Repeat EKG on 7/30 (while on Levaquin) showed QTc table at 475. Has since completed course of abx as below.  * Had been monitoring on telemetry this stay. No concerning findings so tele was ultimately dc'd 8/19     Chronic bilateral LE edema with chronic venous stasis dermatitis and chronic skin changes  Hx of LLE cellulitis  * Was hospitalized in 11/2021 for sepsis dt to pneumonia and LLE cellulitis.   * During this stay, BLE noted to be patty and edematous, no unilateral leg swelling appreciated; LLE had patchy areas of erythema, no fluctuance, no painful areas with palpation; legs warm to touch. Lymphedema consulted.   -- cont LE elevation, lymphedema wraps     Lower back/R flank wound/abscess, suspect dt pressure type injury, s/p surgical debridement on 7/30/22 and placement of wound vac on 8/2/22,  OA  Hx of bilateral hip replacements   Hx of chronic neck and back pain  * Pt with significant back  pain early on in hospital stay. Was requiring IV hydromorphone.  Dose had to be decreased dt somnolence.  * MRI L-spine in 2018 showed multilevel degenerative changes with mild central stenosis. Patient has chronic back pain, reports having it over the last 2 years.   * During this stay, patient was noted with 5-6cm by 2-3cm wound with swelling/fluid/blistering in a fold of her lower back, did not appear infected; this seemed to be the source of her pain. Padded dressing placed and WOC RN ordered. Pain initially improved.   * Was placed on course of Augmentin on 7/24.   * On 7/26, was re-evaluated by WOC RN. Wound appeared more erythematous and purulence expressed. Worsening with shear stress from lift. Procal <0.05, WBC WNL. Abx changed to IV clindamycin.  * Wound cultures obtained. On 7/28, wound grew proteus and staph aureus (MRSA neg). US neg for abscess.   * Lost IV access on 7/28 so abx were changed to oral clindamycin and oral levaquin. IV access re-established but was continued on oral abx.  * General surgery consulted, ultimately underwent excisional debridement of R flank abscess in OR on 7/30. Intraop cultures showed polymicrobial growth with proteus mirabilis x2 strains (both pan-sensitive), corynebacterium striatum and enterococcus faecalis.  * Wound vac placed per general surgery on 8/2 >> subsequently removed during stay.  * ID consulted on 8/2 and abx narrowed to Augmentin alone, completed an additional 5 days of treatment on 8/7.     -- wound per WOC RN, follows weekly  -- prns available for pain  -- cont regular repositioning     Pressure injury bilateral buttocks  * Noted by nursing on 09/02/22. WOC RN following as above.      Suspected meningioma left parietal region, incidentally seen on admission CT on 7/13  * Head CT 7/13 showed a calcified extra-axial mass overlying the left parietal region measuring 1.4 cm, potentially representing a meningioma.  * Will need serial monitoring OP after  discharge.      Anemia, suspect dilutional component: Resolved  * Hgb normal on admit. Hgb 11.5 on 7/16. No overt clinical signs of major bleeding.  * Hgb now stable at 12-13      ntertriginous dermatitis  * Chronic and stable, cont clotrimazole powder     Constipation  * Continue sched bowel regimen     Morbid obesity  * BMI 59 on admission. Recommend aggressive dietary and lifestyle modifications as condition improves     Suspected sleep apnea  * O2 drop to mid 80s overnight 8/14. Briefly placed on 4LPM, which she then removed and then slept okay with sats in 90s thereafter.  * Recommend sleep study as outpatient     Moderate malnutrition in context of acute illness and chronic disease  * Nutritionist following. Appetite poor this stay, needing encouragement to take po.      COVID-19 vaccination status  * Received first dose of vaccine on 8/28, 2nd dose due 9/18/22    FEN: no IVFs, lytes stable, regular diet w/thin liquids  DVT Prophylaxis: Lovenox 40mg subQ BID  Code Status: Full Code    Disposition: Discharge to LTC facility once placement found. SW following    Darby Martin DO    Interval History    Uneventful night. Seen this morning. Was yelling out earlier this morning but appeared to be sleeping comfortably when I stopped by. Discussed with RN. No reports of cp/sob/cough, abd pain/n/v.     -Data reviewed today: I reviewed all new labs and imaging results over the last 24 hours. I personally reviewed no images or EKG's today.    Physical Exam   Temp: 97.6  F (36.4  C) Temp src: Oral BP: 120/58 Pulse: 72   Resp: 17 SpO2: 94 % O2 Device: None (Room air)    Vitals:    09/04/22 1043 09/05/22 0700 09/08/22 0501   Weight: 141.7 kg (312 lb 8 oz) 140 kg (308 lb 9.6 oz) 138.9 kg (306 lb 3.5 oz)     Vital Signs with Ranges  Temp:  [97.4  F (36.3  C)-97.6  F (36.4  C)] 97.6  F (36.4  C)  Pulse:  [72-75] 72  Resp:  [16-17] 17  BP: (120-137)/(58-64) 120/58  SpO2:  [94 %] 94 %  I/O last 3 completed  shifts:  In: 150 [P.O.:150]  Out: -     Constitutional: Resting comfortably, NAD  Respiratory: CTAB, no wheeze/rales/rhonchi, no increased work of breathing  Cardiovascular: HRRR, no MGR, no LE edema  GI: S, NT, ND, +BS   Skin/Integumen: warm/dry  Other:      Medications     - MEDICATION INSTRUCTIONS -         ARIPiprazole  10 mg Oral QPM     aspirin  325 mg Oral Daily     atorvastatin  40 mg Oral QPM     COVID-19 mRNA vacc (PFIZER)  30 mcg Intramuscular Q21 Days     enoxaparin ANTICOAGULANT  40 mg Subcutaneous Q12H     furosemide  20 mg Oral BID     lisinopril  10 mg Oral Daily     miconazole   Topical BID     multivitamin w/minerals  1 tablet Oral Daily     OLANZapine  2.5 mg Oral At Bedtime     polyethylene glycol  17 g Oral Daily     potassium chloride  20 mEq Oral Daily     senna-docusate  1-2 tablet Oral BID     sertraline  50 mg Oral Daily     spironolactone  25 mg Oral Daily       Data   Recent Labs   Lab 09/08/22  0907 09/07/22  0942 09/06/22  1504 09/06/22  0937 09/05/22  0827 09/04/22  0822 09/03/22  1749 09/03/22  1404     --  313  --   --   --   --  304   NA  --   --   --  142 141  --   --   --    POTASSIUM  --   --   --  3.8 3.8 3.4   < >  --    CHLORIDE  --   --   --  104 105  --   --   --    CO2  --   --   --  33* 30  --   --   --    BUN  --   --   --  20 17  --   --   --    CR  --  0.73  --  0.86 0.69 0.66   < >  --    ANIONGAP  --   --   --  5 6  --   --   --    ASHIA  --   --   --  8.6 8.5  --   --   --    GLC  --   --   --  115* 101*  --   --   --     < > = values in this interval not displayed.       No results found for this or any previous visit (from the past 24 hour(s)).

## 2022-09-09 NOTE — PLAN OF CARE
Summary: CVA R [facility]  DATE & TIME: 9/8/22-9/9/22 4776-1999  Cognitive Concerns/ Orientation: A&Ox3, disoriented to situation. Anxious and tearful at times; scheduled zyprexa given at HS.   BEHAVIOR & AGGRESSION TOOL COLOR: Green  CIWA SCORE: N/A   ABNL VS/O2: VS BID, none taken this shift. On RA.  MOBILITY: Total/lift. Baseline WC bound. Not OOB this shift. T/R q2h  PAIN MANAGMENT: Generalized tenderness with cares.  DIET: Regular  BOWEL/BLADDER: Incontinent of bowel and bladder. NO purewick d/t possible pressure injury to L inner thigh.  ABNL LAB/BG: N/A  DRAIN/DEVICES: None  TELEMETRY RHYTHM: N/A  SKIN: BLE patty, flaky with +2 BLE edema, edemawear on. Elevated feet on pillows. TESTS/PROCEDURES: N/A  D/C DATE: Tentatively 9/19 pending status of placement referrals. WOC following.

## 2022-09-10 NOTE — PLAN OF CARE
Goal Outcome Evaluation:    Plan of Care Reviewed With: patient           Summary: KEILY VELASCO  DATE & TIME: 9/9/22 [id number]  Cognitive Concerns/ Orientation: A&Ox3, disoriented to situation. Anxious at times   BEHAVIOR & AGGRESSION TOOL COLOR: Green  CIWA SCORE: N/A   ABNL VS/O2: VS BID, none taken this shift. On RA.  MOBILITY: Total/lift. Baseline WC bound. Not OOB this shift. T/R q2h  PAIN MANAGMENT: Generalized tenderness with cares.  DIET: Regular  BOWEL/BLADDER: Incontinent of bowel and bladder. *NO purewick d/t possible pressure injury to L inner thigh* - no BM this shift  ABNL LAB/BG: N/A  DRAIN/DEVICES: None  TELEMETRY RHYTHM: N/A  SKIN: BLE patty, flaky with +2 BLE edema, edemawear on. Dry/flaky feet/heel Elevated feet on pillows. TESTS/PROCEDURES: N/A  D/C DATE: Tentatively 9/19 pending status of placement referrals. WOC following.

## 2022-09-10 NOTE — PROGRESS NOTES
Care Management Follow Up    Length of Stay (days): 59    Expected Discharge Date: 09/19/2022     Concerns to be Addressed:       Patient plan of care discussed at interdisciplinary rounds: Yes    Anticipated Discharge Disposition: Home     Anticipated Discharge Services:    Anticipated Discharge DME:      Patient/family educated on Medicare website which has current facility and service quality ratings:    Education Provided on the Discharge Plan:    Patient/Family in Agreement with the Plan:      Referrals Placed by CM/SW:    Private pay costs discussed: Not applicable    Additional Information:    Per chart review, the following referrals are pending:    [facility]:  Left vm  [facility]:  Sent liaison alexandru Monten[place] at [facility]:  Will review after 2nd COVID shot  [facility]:  Left vm  [facility]:  Left vm  [facility]:  Left vm    Sw to continue to follow        Keri Will, VADIMSW

## 2022-09-10 NOTE — PLAN OF CARE
Goal Outcome Evaluation:    Summary: CVA R MCA  DATE & TIME: 9/9/22 0708-2801  Cognitive Concerns/ Orientation: A&Ox3, disoriented to situation. Anxious at times   BEHAVIOR & AGGRESSION TOOL COLOR: Green  CIWA SCORE: N/A   ABNL VS/O2: VS BID, none taken this shift. On RA.  MOBILITY: Total/lift. Baseline WC bound. Not OOB this shift. T/R q2h  PAIN MANAGMENT: Generalized tenderness with cares.  DIET: Regular  BOWEL/BLADDER: Incontinent of bowel and bladder. *NO purewick d/t possible pressure injury to L inner thigh* - no BM this shift  ABNL LAB/BG: N/A  DRAIN/DEVICES: None  TELEMETRY RHYTHM: N/A  SKIN: BLE patty, flaky with +2 BLE edema, edemawear on. Dry/flaky feet/heel Elevated feet on pillows. TESTS/PROCEDURES: N/A  D/C DATE: Tentatively 9/19 pending status of placement referrals. WOC following.

## 2022-09-10 NOTE — PLAN OF CARE
Goal Outcome Evaluation:    Summary: CVA R MCA  DATE & TIME: 9/10/22 [id number]  Cognitive Concerns/ Orientation: A&Ox3, disoriented to time/date. Less anxious today  BEHAVIOR & AGGRESSION TOOL COLOR: Green  CIWA SCORE: N/A   ABNL VS/O2: VS BID, VSS On RA.  MOBILITY: Total/lift. Baseline WC bound. Up to chair for breakfast. T/R q2h  PAIN MANAGMENT: Generalized tenderness with cares.  DIET: Regular  BOWEL/BLADDER: Incontinent of bowel and bladder. *NO purewick d/t possible pressure injury to L inner thigh* - no BM this shift. Up to BSC x1  ABNL LAB/BG: N/A  DRAIN/DEVICES: None  TELEMETRY RHYTHM: N/A  SKIN: BLE patty, flaky with +2 BLE edema, edemawear on.   TESTS/PROCEDURES: N/A  D/C DATE: LTC placement  OTHER IMPORTANT INFO: WOC following.

## 2022-09-10 NOTE — PROGRESS NOTES
[facility]    Hospitalist Progress Note    Assessment & Plan   Cristy Bailey is a 77 year old female with a PMHx significant for morbid obesity, hypertension, and hyperlipidemia, who presented to [facility] 7/13/2022 with complete left-sided paralysis, left-sided facial droop, and difficulty speaking. CTA head remarkable for right MCA occlusion. She was given tenecteplase and subsequently transferred to [facility] 7/13/2022 for thrombectomy.      Hospital stay complicated by flank wound which required surgical debridement and placement of wound vac and findings LTC placement.     Acute R MCA ischemic stroke with edema and right to left midline shift  S/p tenecteplase and subsequent R MCA mechanical thrombectomy 7/13/22  * Presented to [facility] 7/13 with complete paralysis, left-sided facial droop, and aphasia. Code stroke initiated. Head CT 7/13 showed signs of an evolving R MCA infarct. CTA head 7/13 showed a right carotid terminus occlusion, right middle cerebral artery M1  segment occlusion with poor opacification of the more distal right MCA branches/poor collateral flow. CTA neck 7/13 negative for acute occlusions. Pt given tenecteplase 7/13 at 13:11. Noted to be agitated and was subsequently intubated given need for procedure.   * Subsequently transferred to [facility] 7/13/2022 where she underwent above procedure.   7/14: Extubated. Head CT 7/14 showed evolution of acute infarct involving the R MCA distribution with increased swelling, cortical effacement, and new mild right-to-left midline shift; questionable hypoattenuation involving the bilateral occipital lobes which could be artifactual.   * Additional stroke workup pursued this stay -- echo 7/14 showed EF 50%, grade 1 diastolic dysfunction   * Started ASA and atorvastatin per stroke team.   * Repeat head CT 7/15 showed evolving R MCA stroke with areas of edema, overall stable.     -- conts on full dose ASA and  statin  -- goal SBP <140/90 and met   -- monitored on telemetry for first several wks of hospital stay without abnl rhythm so can likely defer prior recommendations for 30d cardiac event monitor at discharge  -- will need to follow up with [facility] in 6-8 wks (due beginning of September)     Anxiety   Cognitive impairment, confusion as above.   * As stay has progressed, patient has consistently been oriented x2 (to self, location), unable to state the year. Also noted to have intermittent situational confusion. States family members have been visiting when they have not.   * Ongoing issues with anxiety this stay -- had been on regimen of Seroquel 12.5mg at dinner and 25mg HS with 12.5mg po q6h prn available. Mirtazapine 7.5mg on 8/15 evening given decreased appetite  * Psych consulted for assistance with ongoing mgmt -- seen on 8/16 and meds adjusted. Seroquel HS changed to Abilify 5mg HS. Recommended Zyptexa 5mg q6h prn for breakthrough insomnia/agitation while going off Seroquel. Consider uptitation of Abilify per psych recs. Advised uptitration of sertraline (25mg x3d then increase to 50mg daily beginning 8/20) and Remeron stopped. Also advised to liberalize Ativan to 0.5mg q6h prn.    * Seen by psych on 8/24, Abilify increased from 5mg to 10mg.  * PRN ativan stopped on 08/27/22 as leading to increased confusion. Hydroxyzine stopped  as well.      -- cont current regimen of meds including Abilify 5mg every evening, Zyprexa 2.5mg HS and sertraline 50mg daily  -- cont prns per psych including Zyprexa 5mg q6h prn     Urinary retention: Resolved  * Retaining urine on 8/14. UA WNL, not worrisome for infection  * Requiring straight cath x1 on 8/20 PM  * Good UOP, no longer requiring straight cath. No evidence of obstruction on PVR.      Dysphagia due to CVA: Resolved  * Seen by SLP and noted with dysphagia. Initially required some intermittent fluid boluses.   * Was eventually able to advance to regular diet w/thin  liquids     Dyslipidemia  * FLP this stay showed tot cholest 163, HDL 47, LDL 91, .   * Started on atorvastatin 40 mg daily     Volume overload dt diastolic CHF exacerbation: Improved  Essential hypertension  Hypokalemia, recurrent on lasix, and often refusing potassium   * Not on/needing meds PTA.   * As above, echo this stay showed EF 50% with grade I diastolic dysfunction; RV not well visualized but appeared mild-moderately dilated with global systolic function probably mildly reduced.   * BPs were initially soft this stay and required IVFs. BPs improved.   * Developed pedal edema required IV Lasix. Ultimately transition to oral Lasix.  * Started on lisinopril this stay  * BPs improved during stay.   * Lasix decreased from 40mg in AM and 20mg in PM to 20mg BID on 9/3 and added spironolactone 25mg daily    -- cont lisinopril 10mg daily  -- cont Lasix 20mg po BID  -- cont spironolactone 25mg daily  -- cont KCl 20mEq po daily     Hypophosphatemia: Resolved     Prolonged QTc   * EKG on 7/13 showed QTc 494.   * Repeat EKG on 7/30 (while on Levaquin) showed QTc table at 475. Has since completed course of abx as below.  * Had been monitoring on telemetry this stay. No concerning findings so tele was ultimately dc'd 8/19     Chronic bilateral LE edema with chronic venous stasis dermatitis and chronic skin changes  Hx of LLE cellulitis  * Was hospitalized in 11/2021 for sepsis dt to pneumonia and LLE cellulitis.   * During this stay, BLE noted to be patty and edematous, no unilateral leg swelling appreciated; LLE had patchy areas of erythema, no fluctuance, no painful areas with palpation; legs warm to touch. Lymphedema consulted.   -- cont LE elevation, lymphedema wraps     Lower back/R flank wound/abscess, suspect dt pressure type injury, s/p surgical debridement on 7/30/22 and placement of wound vac on 8/2/22,  OA  Hx of bilateral hip replacements   Hx of chronic neck and back pain  * Pt with significant back  pain early on in hospital stay. Was requiring IV hydromorphone.  Dose had to be decreased dt somnolence.  * MRI L-spine in 2018 showed multilevel degenerative changes with mild central stenosis. Patient has chronic back pain, reports having it over the last 2 years.   * During this stay, patient was noted with 5-6cm by 2-3cm wound with swelling/fluid/blistering in a fold of her lower back, did not appear infected; this seemed to be the source of her pain. Padded dressing placed and WOC RN ordered. Pain initially improved.   * Was placed on course of Augmentin on 7/24.   * On 7/26, was re-evaluated by WOC RN. Wound appeared more erythematous and purulence expressed. Worsening with shear stress from lift. Procal <0.05, WBC WNL. Abx changed to IV clindamycin.  * Wound cultures obtained. On 7/28, wound grew proteus and staph aureus (MRSA neg). US neg for abscess.   * Lost IV access on 7/28 so abx were changed to oral clindamycin and oral levaquin. IV access re-established but was continued on oral abx.  * General surgery consulted, ultimately underwent excisional debridement of R flank abscess in OR on 7/30. Intraop cultures showed polymicrobial growth with proteus mirabilis x2 strains (both pan-sensitive), corynebacterium striatum and enterococcus faecalis.  * Wound vac placed per general surgery on 8/2 >> subsequently removed during stay.  * ID consulted on 8/2 and abx narrowed to Augmentin alone, completed an additional 5 days of treatment on 8/7.     -- wound per WOC RN, follows weekly  -- prns available for pain  -- cont regular repositioning     Pressure injury bilateral buttocks  * Noted by nursing on 09/02/22. WOC RN following as above.      Suspected meningioma left parietal region, incidentally seen on admission CT on 7/13  * Head CT 7/13 showed a calcified extra-axial mass overlying the left parietal region measuring 1.4 cm, potentially representing a meningioma.  * Will need serial monitoring OP after  discharge.      Anemia, suspect dilutional component: Resolved  * Hgb normal on admit. Hgb 11.5 on 7/16. No overt clinical signs of major bleeding.  * Hgb now stable at 12-13      ntertriginous dermatitis  * Chronic and stable, cont clotrimazole powder     Constipation  * Continue sched bowel regimen     Morbid obesity  * BMI 59 on admission. Recommend aggressive dietary and lifestyle modifications as condition improves     Suspected sleep apnea  * O2 drop to mid 80s overnight 8/14. Briefly placed on 4LPM, which she then removed and then slept okay with sats in 90s thereafter.  * Recommend sleep study as outpatient     Moderate malnutrition in context of acute illness and chronic disease  * Nutritionist following. Appetite poor this stay, needing encouragement to take po.      COVID-19 vaccination status  * Received first dose of vaccine on 8/28, 2nd dose due 9/18/22    FEN: no IVFs, lytes stable, regular diet w/thin liquids  DVT Prophylaxis: Lovenox 40mg subQ BID  Code Status: Full Code    Disposition: Discharge to LTC facility once placement found. SW following    Dale Prajapati MD Hospitalist    Interval History      Care resumed, chart reviewed, discussed with nursing and evaluated patient this morning.  No acute issues reported.  Patient feels she has trouble swallowing pills but states it has been like that since her childhood.  No dysphagia or odynophagia.  No overt aspiration noted.  Denies cough or shortness of breath.     -Data reviewed today: I reviewed all new labs and imaging results over the last 24 hours. I personally reviewed no images or EKG's today.    Physical Exam   Temp: 97.5  F (36.4  C) Temp src: Oral BP: 114/51 Pulse: 77   Resp: 18 SpO2: 94 % O2 Device: None (Room air)    Vitals:    09/04/22 1043 09/05/22 0700 09/08/22 0501   Weight: 141.7 kg (312 lb 8 oz) 140 kg (308 lb 9.6 oz) 138.9 kg (306 lb 3.5 oz)     Vital Signs with Ranges  Temp:  [97.5  F (36.4  C)-98  F (36.7  C)] 97.5  F (36.4   C)  Pulse:  [74-77] 77  Resp:  [18] 18  BP: ()/(51-58) 114/51  SpO2:  [94 %-95 %] 94 %  I/O last 3 completed shifts:  In: 250 [P.O.:250]  Out: -     Constitutional: Resting comfortably, NAD  Respiratory: CTAB, no wheeze/rales/rhonchi, no increased work of breathing  Cardiovascular: HRRR, no MGR, extremities are puffy, edema to feet  GI: S, NT, ND, +BS   Skin/Integumen: warm/dry  Other: Shallow ulcer over the Rt flank with healthy appearing granulation tissue over the base.     Medications     - MEDICATION INSTRUCTIONS -         ARIPiprazole  10 mg Oral QPM     aspirin  325 mg Oral Daily     atorvastatin  40 mg Oral QPM     COVID-19 mRNA vacc (PFIZER)  30 mcg Intramuscular Q21 Days     enoxaparin ANTICOAGULANT  40 mg Subcutaneous Q12H     furosemide  20 mg Oral BID     lisinopril  10 mg Oral Daily     miconazole   Topical BID     multivitamin w/minerals  1 tablet Oral Daily     OLANZapine  2.5 mg Oral At Bedtime     polyethylene glycol  17 g Oral Daily     potassium chloride  20 mEq Oral Daily     senna-docusate  1-2 tablet Oral BID     sertraline  50 mg Oral Daily     spironolactone  25 mg Oral Daily       Data   Recent Labs   Lab 09/10/22  0837 09/09/22  1727 09/08/22  0907 09/07/22  0942 09/06/22  1504 09/06/22  0937 09/05/22  0827 09/04/22  0822   PLT  --  294 287  --  313  --   --   --    NA  --   --   --   --   --  142 141  --    POTASSIUM  --   --   --   --   --  3.8 3.8 3.4   CHLORIDE  --   --   --   --   --  104 105  --    CO2  --   --   --   --   --  33* 30  --    BUN  --   --   --   --   --  20 17  --    CR 0.74  --   --  0.73  --  0.86 0.69 0.66   ANIONGAP  --   --   --   --   --  5 6  --    ASHIA  --   --   --   --   --  8.6 8.5  --    GLC  --   --   --   --   --  115* 101*  --        No results found for this or any previous visit (from the past 24 hour(s)).

## 2022-09-11 NOTE — PLAN OF CARE
Goal Outcome Evaluation:    Summary: CVA R MCA  DATE & TIME: 9/11/22 2919-9473  Cognitive Concerns/ Orientation: A&Ox3, disoriented to time/date. Anxious and calling out at times. Wants staff to stay in room.    BEHAVIOR & AGGRESSION TOOL COLOR: Green  CIWA SCORE: N/A   ABNL VS/O2: VS BID, VSS On RA.  MOBILITY: Total/lift. Baseline WC bound. Up to chair for 2 hours today. T/R q2h  PAIN MANAGMENT: Generalized tenderness with cares.  DIET: Regular, appetite poor. Encouraging liquids  BOWEL/BLADDER: Incontinent of bowel and bladder. *NO purewick d/t possible pressure injury to L inner thigh* - no BM this shift. Up to BSC x1   ABNL LAB/BG: N/A  DRAIN/DEVICES: None  TELEMETRY RHYTHM: N/A  SKIN: BLE patty, flaky with +2 BLE edema, edemawear on. Mepilex right lower back.   TESTS/PROCEDURES: N/A  D/C DATE: LTC placement  OTHER IMPORTANT INFO: WOC following.

## 2022-09-11 NOTE — PROGRESS NOTES
[facility]    Hospitalist Progress Note    Assessment & Plan   Cristy Bailey is a 77 year old female with a PMHx significant for morbid obesity, hypertension, and hyperlipidemia, who presented to [facility] 7/13/2022 with complete left-sided paralysis, left-sided facial droop, and difficulty speaking. CTA head remarkable for right MCA occlusion. She was given tenecteplase and subsequently transferred to [facility] 7/13/2022 for thrombectomy.      Hospital stay complicated by flank wound which required surgical debridement and placement of wound vac and findings LTC placement.     Acute R MCA ischemic stroke with edema and right to left midline shift  S/p tenecteplase and subsequent R MCA mechanical thrombectomy 7/13/22  * Presented to [facility] 7/13 with complete paralysis, left-sided facial droop, and aphasia. Code stroke initiated. Head CT 7/13 showed signs of an evolving R MCA infarct. CTA head 7/13 showed a right carotid terminus occlusion, right middle cerebral artery M1  segment occlusion with poor opacification of the more distal right MCA branches/poor collateral flow. CTA neck 7/13 negative for acute occlusions. Pt given tenecteplase 7/13 at 13:11. Noted to be agitated and was subsequently intubated given need for procedure.   * Subsequently transferred to [facility] 7/13/2022 where she underwent above procedure.   7/14: Extubated. Head CT 7/14 showed evolution of acute infarct involving the R MCA distribution with increased swelling, cortical effacement, and new mild right-to-left midline shift; questionable hypoattenuation involving the bilateral occipital lobes which could be artifactual.   * Additional stroke workup pursued this stay -- echo 7/14 showed EF 50%, grade 1 diastolic dysfunction   * Started ASA and atorvastatin per stroke team.   * Repeat head CT 7/15 showed evolving R MCA stroke with areas of edema, overall stable.     -- conts on full dose ASA and  statin  -- goal SBP <140/90 and met   -- monitored on telemetry for first several wks of hospital stay without abnl rhythm so can likely defer prior recommendations for 30d cardiac event monitor at discharge  -- will need to follow up with [facility] in 6-8 wks (due beginning of September)     Anxiety   Cognitive impairment, confusion as above.   * As stay has progressed, patient has consistently been oriented x2 (to self, location), unable to state the year. Also noted to have intermittent situational confusion. States family members have been visiting when they have not.   * Ongoing issues with anxiety this stay -- had been on regimen of Seroquel 12.5mg at dinner and 25mg HS with 12.5mg po q6h prn available. Mirtazapine 7.5mg on 8/15 evening given decreased appetite  * Psych consulted for assistance with ongoing mgmt -- seen on 8/16 and meds adjusted. Seroquel HS changed to Abilify 5mg HS. Recommended Zyptexa 5mg q6h prn for breakthrough insomnia/agitation while going off Seroquel. Consider uptitation of Abilify per psych recs. Advised uptitration of sertraline (25mg x3d then increase to 50mg daily beginning 8/20) and Remeron stopped. Also advised to liberalize Ativan to 0.5mg q6h prn.    * Seen by psych on 8/24, Abilify increased from 5mg to 10mg.  * PRN ativan stopped on 08/27/22 as leading to increased confusion. Hydroxyzine stopped  as well.      -- cont current regimen of meds including Abilify 5mg every evening, Zyprexa 2.5mg HS and sertraline 50mg daily  -- cont prns per psych including Zyprexa 5mg q6h prn     Urinary retention: Resolved  * Retaining urine on 8/14. UA WNL, not worrisome for infection  * Requiring straight cath x1 on 8/20 PM  * Good UOP, no longer requiring straight cath. No evidence of obstruction on PVR.      Dysphagia due to CVA: Resolved  * Seen by SLP and noted with dysphagia. Initially required some intermittent fluid boluses.   * Was eventually able to advance to regular diet w/thin  liquids     Dyslipidemia  * FLP this stay showed tot cholest 163, HDL 47, LDL 91, .   * Started on atorvastatin 40 mg daily     Volume overload dt diastolic CHF exacerbation: Improved  Essential hypertension  Hypokalemia, recurrent on lasix, and often refusing potassium   * Not on/needing meds PTA.   * As above, echo this stay showed EF 50% with grade I diastolic dysfunction; RV not well visualized but appeared mild-moderately dilated with global systolic function probably mildly reduced.   * BPs were initially soft this stay and required IVFs. BPs improved.   * Developed pedal edema required IV Lasix. Ultimately transition to oral Lasix.  * Started on lisinopril this stay  * BPs improved during stay.   * Lasix decreased from 40mg in AM and 20mg in PM to 20mg BID on 9/3 and added spironolactone 25mg daily    -- cont lisinopril 10mg daily  -- cont Lasix 20mg po BID  -- cont spironolactone 25mg daily  -- cont KCl 20mEq po daily     Hypophosphatemia: Resolved     Prolonged QTc   * EKG on 7/13 showed QTc 494.   * Repeat EKG on 7/30 (while on Levaquin) showed QTc table at 475. Has since completed course of abx as below.  * Had been monitoring on telemetry this stay. No concerning findings so tele was ultimately dc'd 8/19     Chronic bilateral LE edema with chronic venous stasis dermatitis and chronic skin changes  Hx of LLE cellulitis  * Was hospitalized in 11/2021 for sepsis dt to pneumonia and LLE cellulitis.   * During this stay, BLE noted to be patty and edematous, no unilateral leg swelling appreciated; LLE had patchy areas of erythema, no fluctuance, no painful areas with palpation; legs warm to touch. Lymphedema consulted.   -- cont LE elevation, lymphedema wraps     Lower back/R flank wound/abscess, suspect dt pressure type injury, s/p surgical debridement on 7/30/22 and placement of wound vac on 8/2/22,  OA  Hx of bilateral hip replacements   Hx of chronic neck and back pain  * Pt with significant back  pain early on in hospital stay. Was requiring IV hydromorphone.  Dose had to be decreased dt somnolence.  * MRI L-spine in 2018 showed multilevel degenerative changes with mild central stenosis. Patient has chronic back pain, reports having it over the last 2 years.   * During this stay, patient was noted with 5-6cm by 2-3cm wound with swelling/fluid/blistering in a fold of her lower back, did not appear infected; this seemed to be the source of her pain. Padded dressing placed and WOC RN ordered. Pain initially improved.   * Was placed on course of Augmentin on 7/24.   * On 7/26, was re-evaluated by WOC RN. Wound appeared more erythematous and purulence expressed. Worsening with shear stress from lift. Procal <0.05, WBC WNL. Abx changed to IV clindamycin.  * Wound cultures obtained. On 7/28, wound grew proteus and staph aureus (MRSA neg). US neg for abscess.   * Lost IV access on 7/28 so abx were changed to oral clindamycin and oral levaquin. IV access re-established but was continued on oral abx.  * General surgery consulted, ultimately underwent excisional debridement of R flank abscess in OR on 7/30. Intraop cultures showed polymicrobial growth with proteus mirabilis x2 strains (both pan-sensitive), corynebacterium striatum and enterococcus faecalis.  * Wound vac placed per general surgery on 8/2 >> subsequently removed during stay.  * ID consulted on 8/2 and abx narrowed to Augmentin alone, completed an additional 5 days of treatment on 8/7.     -- wound per WOC RN, follows weekly  -- prns available for pain  -- cont regular repositioning     Pressure injury bilateral buttocks  * Noted by nursing on 09/02/22. WOC RN following as above.      Suspected meningioma left parietal region, incidentally seen on admission CT on 7/13  * Head CT 7/13 showed a calcified extra-axial mass overlying the left parietal region measuring 1.4 cm, potentially representing a meningioma.  * Will need serial monitoring OP after  discharge.      Anemia, suspect dilutional component: Resolved  * Hgb normal on admit. Hgb 11.5 on 7/16. No overt clinical signs of major bleeding.  * Hgb now stable at 12-13      ntertriginous dermatitis  * Chronic and stable, cont clotrimazole powder     Constipation  * Continue sched bowel regimen     Morbid obesity  * BMI 59 on admission. Recommend aggressive dietary and lifestyle modifications as condition improves     Suspected sleep apnea  * O2 drop to mid 80s overnight 8/14. Briefly placed on 4LPM, which she then removed and then slept okay with sats in 90s thereafter.  * Recommend sleep study as outpatient     Moderate malnutrition in context of acute illness and chronic disease  * Nutritionist following. Appetite poor this stay, needing encouragement to take po.      COVID-19 vaccination status  * Received first dose of vaccine on 8/28, 2nd dose due 9/18/22    FEN: no IVFs, lytes stable, regular diet w/thin liquids  DVT Prophylaxis: Lovenox 40mg subQ BID  Code Status: Full Code    Disposition: Discharge to LTC facility once placement found. SW following    Dale Prajapati MD   Hospitalist    Interval History      No acute issue reported by patient or RN. difficulty swallowing pills, not new. Doing good with PO meals.      -Data reviewed today: I reviewed all new labs and imaging results over the last 24 hours. I personally reviewed no images or EKG's today.    Physical Exam   Temp: 97.6  F (36.4  C) Temp src: Oral BP: 100/61 Pulse: 71   Resp: 18 SpO2: 94 % O2 Device: None (Room air)    Vitals:    09/04/22 1043 09/05/22 0700 09/08/22 0501   Weight: 141.7 kg (312 lb 8 oz) 140 kg (308 lb 9.6 oz) 138.9 kg (306 lb 3.5 oz)     Vital Signs with Ranges  Temp:  [97.6  F (36.4  C)] 97.6  F (36.4  C)  Pulse:  [71-74] 71  Resp:  [18] 18  BP: (100-106)/(56-61) 100/61  SpO2:  [92 %-94 %] 94 %  I/O last 3 completed shifts:  In: 480 [P.O.:480]  Out: -     Constitutional: Resting comfortably, NAD  Respiratory: CTAB, no  wheeze/rales/rhonchi, no increased work of breathing  Cardiovascular: HRRR, no MGR, extremities are puffy, edema to feet  GI: S, NT, ND, +BS   Skin/Integumen: warm/dry  Other: Rt flank ulcer not examined    Medications     - MEDICATION INSTRUCTIONS -         ARIPiprazole  10 mg Oral QPM     aspirin  325 mg Oral Daily     atorvastatin  40 mg Oral QPM     COVID-19 mRNA vacc (PFIZER)  30 mcg Intramuscular Q21 Days     enoxaparin ANTICOAGULANT  40 mg Subcutaneous Q12H     furosemide  20 mg Oral BID     lisinopril  10 mg Oral Daily     miconazole   Topical BID     multivitamin w/minerals  1 tablet Oral Daily     OLANZapine  2.5 mg Oral At Bedtime     polyethylene glycol  17 g Oral Daily     potassium chloride  20 mEq Oral Daily     senna-docusate  1-2 tablet Oral BID     sertraline  50 mg Oral Daily     spironolactone  25 mg Oral Daily       Data   Recent Labs   Lab 09/10/22  0837 09/09/22  1727 09/08/22  0907 09/07/22  0942 09/06/22  1504 09/06/22  0937 09/05/22  0827   PLT  --  294 287  --  313  --   --    NA  --   --   --   --   --  142 141   POTASSIUM  --   --   --   --   --  3.8 3.8   CHLORIDE  --   --   --   --   --  104 105   CO2  --   --   --   --   --  33* 30   BUN  --   --   --   --   --  20 17   CR 0.74  --   --  0.73  --  0.86 0.69   ANIONGAP  --   --   --   --   --  5 6   ASHIA  --   --   --   --   --  8.6 8.5   GLC  --   --   --   --   --  115* 101*       No results found for this or any previous visit (from the past 24 hour(s)).

## 2022-09-11 NOTE — PROVIDER NOTIFICATION
MD Notification    Notified Person: MD    Notified Person Name: Dale Prajapati    Notification Date/Time: 9/11/22 1240    Notification Interaction: Vocera Message    Purpose of Notification: Patient has not voided since yesterday evening. Bladder scan shows 140.    Orders Received:No new orders at this time.    Comments:

## 2022-09-11 NOTE — PLAN OF CARE
Summary: CVA R MCA  DATE & TIME: 9/10/22 NOC  Cognitive Concerns/ Orientation: A&Ox3, disoriented to time/date. Anxious and calling out at times.  Wants staff to stay in room.    BEHAVIOR & AGGRESSION TOOL COLOR: Green  CIWA SCORE: N/A   ABNL VS/O2: VS BID, VSS On RA.  MOBILITY: Total/lift. Baseline WC bound. Up to chair for breakfast. T/R q2h  PAIN MANAGMENT: Generalized tenderness with cares.  DIET: Regular  BOWEL/BLADDER: Incontinent of bowel and bladder. *NO purewick d/t possible pressure injury to L inner thigh* - no BM this shift.  Patient denied need to urinate and was dry.  Bladder scan showed 91ml.  Patient reports not urinating much at night normally.    ABNL LAB/BG: N/A  DRAIN/DEVICES: None  TELEMETRY RHYTHM: N/A  SKIN: BLE patty, flaky with +2 BLE edema, edemawear on.   TESTS/PROCEDURES: N/A  D/C DATE: LTC placement  OTHER IMPORTANT INFO: WOC following.

## 2022-09-12 NOTE — PROGRESS NOTES
Summary: CVA R MCA  DATE & TIME: 9/12/22 0700-1?30  Cognitive Concerns/ Orientation: A&Ox2, disoriented to time and situation. Anxious and calling out at times. Wants staff to stay in room. Short term memory loss  BEHAVIOR & AGGRESSION TOOL COLOR: Green  CIWA SCORE: N/A   ABNL VS/O2: VS BID, VSS On RA.  MOBILITY: Total/lift. Baseline WC bound. Up to chair for 2 hours today. T/R q2h, refuses at times  PAIN MANAGMENT: No pain medication administered. pt states at times her legs are in a lot of pain at times but is refusing pain meds.  DIET: Regular, appetite poor. Encouraging liquids refusing  BOWEL/BLADDER: usually Incontinent of bowel and bladder, has not voided in over 24 hrs, dayshift MD aware. Bladder scanned 82 ml *NO purewick d/t possible pressure injury to L inner thigh* - no BM this shift.    ABNL LAB/BG: N/A  DRAIN/DEVICES: None  TELEMETRY RHYTHM: N/A  SKIN: BLE patty, flaky with +2 BLE edema, edemawear on. Mepilex right lower back.   TESTS/PROCEDURES: N/A  D/C DATE: LTC placement  OTHER IMPORTANT INFO: WOC following.

## 2022-09-12 NOTE — PROVIDER NOTIFICATION
Brief update:    Paged regarding low urine output, refusing to drink water, poor oral intake.    Currently without IV.  There is a question if creatinine should be rechecked in a.m.    None patient has been hospitalized since July 13 and is awaiting long-term care placement.    Note that when patient was admitted to the ICU, a full CODE STATUS was entered.  Uncertain regarding discussion surrounding this, though I do note a prior POLST requesting DNR/DNI status approximately 8 months earlier (Nov 2021).  This POLST form was signed by the patient, also noted a desire to not receive artificial nutrition via tube feedings.    This is pertinent in the setting of patient declining oral intake.    Will defer recheck renal function to rounding provider, as thus far has been stable.  Strongly consider reversion to prior POLST form unless patient clearly desired full CODE STATUS and had capacity to make this decision.  I am unable to ascertain this information from chart review.    Salvador Diaz MD  4:18 AM

## 2022-09-12 NOTE — PLAN OF CARE
Goal Outcome Evaluation:  Summary: CVA R MCA  DATE & TIME: 9/11/22 5284-1385  Cognitive Concerns/ Orientation: A&Ox2, disoriented to time and situation. Anxious and calling out at times. Wants staff to stay in room. Short term memory loss  BEHAVIOR & AGGRESSION TOOL COLOR: Green  CIWA SCORE: N/A   ABNL VS/O2: VS BID, VSS On RA.  MOBILITY: Total/lift. Baseline WC bound. Up to chair for 2 hours today. T/R q2h, refuses at times  PAIN MANAGMENT: 2.5 mg Oxy given for leg pain, pt states at times her legs are in a lot of pain at times but is refusing pain meds.  DIET: Regular, appetite poor. Encouraging liquids refusing  BOWEL/BLADDER: usually Incontinent of bowel and bladder, has not voided in over 24 hrs, dayshift provider aware  *NO purewick d/t possible pressure injury to L inner thigh* - no BM this shift.    ABNL LAB/BG: N/A  DRAIN/DEVICES: None  TELEMETRY RHYTHM: N/A  SKIN: BLE patty, flaky with +2 BLE edema, edemawear on. Mepilex right lower back.   TESTS/PROCEDURES: N/A  D/C DATE: LTC placement  OTHER IMPORTANT INFO: WOC following. Pt refusing meds at times, even crushed in pudding. Extra dose of Zyprexa given due to pt yelling out frequently, reminding pt to use call light instead of yelling

## 2022-09-12 NOTE — PROVIDER NOTIFICATION
MD Notification    Notified Person: MD    Notified Person Name: eckert    Notification Date/Time: 9/12/22 0338    Notification Interaction: text page    Purpose of Notification: [address]   Pt has not voided in over 24 hrs, refusing to drink water most times, no IV. Dr ratliff mentioned checking a creat this morning possibly     Orders Received:    Comments:

## 2022-09-12 NOTE — PROGRESS NOTES
[facility]    Hospitalist Progress Note    Assessment & Plan   Cristy Bailey is a 77 year old female with a PMHx significant for morbid obesity, hypertension, and hyperlipidemia, who presented to [facility] 7/13/2022 with complete left-sided paralysis, left-sided facial droop, and difficulty speaking. CTA head remarkable for right MCA occlusion. She was given tenecteplase and subsequently transferred to [facility] 7/13/2022 for thrombectomy.      Hospital stay complicated by flank wound which required surgical debridement and placement of wound vac and findings LTC placement.     Acute R MCA ischemic stroke with edema and right to left midline shift  S/p tenecteplase and subsequent R MCA mechanical thrombectomy 7/13/22  * Presented to [facility] 7/13 with complete paralysis, left-sided facial droop, and aphasia. Code stroke initiated. Head CT 7/13 showed signs of an evolving R MCA infarct. CTA head 7/13 showed a right carotid terminus occlusion, right middle cerebral artery M1  segment occlusion with poor opacification of the more distal right MCA branches/poor collateral flow. CTA neck 7/13 negative for acute occlusions. Pt given tenecteplase 7/13 at 13:11. Noted to be agitated and was subsequently intubated given need for procedure.   * Subsequently transferred to [facility] 7/13/2022 where she underwent above procedure.   7/14: Extubated. Head CT 7/14 showed evolution of acute infarct involving the R MCA distribution with increased swelling, cortical effacement, and new mild right-to-left midline shift; questionable hypoattenuation involving the bilateral occipital lobes which could be artifactual.   * Additional stroke workup pursued this stay -- echo 7/14 showed EF 50%, grade 1 diastolic dysfunction   * Started ASA and atorvastatin per stroke team.   * Repeat head CT 7/15 showed evolving R MCA stroke with areas of edema, overall stable.     -- conts on full dose ASA and  statin  -- goal SBP <140/90 and met   -- monitored on telemetry for first several wks of hospital stay without abnl rhythm so can likely defer prior recommendations for 30d cardiac event monitor at discharge  -- will need to follow up with [facility] in 6-8 wks (due beginning of September)     Anxiety   Cognitive impairment, confusion as above.   * As stay has progressed, patient has consistently been oriented x2 (to self, location), unable to state the year. Also noted to have intermittent situational confusion. States family members have been visiting when they have not.   * Ongoing issues with anxiety this stay -- had been on regimen of Seroquel 12.5mg at dinner and 25mg HS with 12.5mg po q6h prn available. Mirtazapine 7.5mg on 8/15 evening given decreased appetite  * Psych consulted for assistance with ongoing mgmt -- seen on 8/16 and meds adjusted. Seroquel HS changed to Abilify 5mg HS. Recommended Zyptexa 5mg q6h prn for breakthrough insomnia/agitation while going off Seroquel. Consider uptitation of Abilify per psych recs. Advised uptitration of sertraline (25mg x3d then increase to 50mg daily beginning 8/20) and Remeron stopped. Also advised to liberalize Ativan to 0.5mg q6h prn.    * Seen by psych on 8/24, Abilify increased from 5mg to 10mg.  * PRN ativan stopped on 08/27/22 as leading to increased confusion. Hydroxyzine stopped  as well.      -- cont current regimen of meds including Abilify 5mg every evening, Zyprexa 2.5mg HS and sertraline 50mg daily  -- cont prns per psych including Zyprexa 5mg q6h prn     Urinary retention: Resolved  * Retaining urine on 8/14. UA WNL, not worrisome for infection  * Requiring straight cath x1 on 8/20 PM  * Good UOP, no longer requiring straight cath. No evidence of obstruction on PVR.      Dysphagia due to CVA: Resolved  * Seen by SLP and noted with dysphagia. Initially required some intermittent fluid boluses.   * Was eventually able to advance to regular diet w/thin  liquids     Dyslipidemia  * FLP this stay showed tot cholest 163, HDL 47, LDL 91, .   * Started on atorvastatin 40 mg daily     Volume overload dt diastolic CHF exacerbation: Improved  Essential hypertension  Hypokalemia, recurrent on lasix, and often refusing potassium   * Not on/needing meds PTA.   * As above, echo this stay showed EF 50% with grade I diastolic dysfunction; RV not well visualized but appeared mild-moderately dilated with global systolic function probably mildly reduced.   * BPs were initially soft this stay and required IVFs. BPs improved.   * Developed pedal edema required IV Lasix. Ultimately transition to oral Lasix.  * Started on lisinopril this stay  * BPs improved during stay.   * Lasix decreased from 40mg in AM and 20mg in PM to 20mg BID on 9/3 and added spironolactone 25mg daily    -- cont lisinopril 10mg daily  -- cont Lasix 20mg po BID  -- cont spironolactone 25mg daily  -- cont KCl 20mEq po daily     Hypophosphatemia: Resolved     Prolonged QTc   * EKG on 7/13 showed QTc 494.   * Repeat EKG on 7/30 (while on Levaquin) showed QTc table at 475. Has since completed course of abx as below.  * Had been monitoring on telemetry this stay. No concerning findings so tele was ultimately dc'd 8/19     Chronic bilateral LE edema with chronic venous stasis dermatitis and chronic skin changes  Hx of LLE cellulitis  * Was hospitalized in 11/2021 for sepsis dt to pneumonia and LLE cellulitis.   * During this stay, BLE noted to be patty and edematous, no unilateral leg swelling appreciated; LLE had patchy areas of erythema, no fluctuance, no painful areas with palpation; legs warm to touch. Lymphedema consulted.   -- cont LE elevation, lymphedema wraps     Lower back/R flank wound/abscess, suspect dt pressure type injury, s/p surgical debridement on 7/30/22 and placement of wound vac on 8/2/22,  OA  Hx of bilateral hip replacements   Hx of chronic neck and back pain  * Pt with significant back  pain early on in hospital stay. Was requiring IV hydromorphone.  Dose had to be decreased dt somnolence.  * MRI L-spine in 2018 showed multilevel degenerative changes with mild central stenosis. Patient has chronic back pain, reports having it over the last 2 years.   * During this stay, patient was noted with 5-6cm by 2-3cm wound with swelling/fluid/blistering in a fold of her lower back, did not appear infected; this seemed to be the source of her pain. Padded dressing placed and WOC RN ordered. Pain initially improved.   * Was placed on course of Augmentin on 7/24.   * On 7/26, was re-evaluated by WOC RN. Wound appeared more erythematous and purulence expressed. Worsening with shear stress from lift. Procal <0.05, WBC WNL. Abx changed to IV clindamycin.  * Wound cultures obtained. On 7/28, wound grew proteus and staph aureus (MRSA neg). US neg for abscess.   * Lost IV access on 7/28 so abx were changed to oral clindamycin and oral levaquin. IV access re-established but was continued on oral abx.  * General surgery consulted, ultimately underwent excisional debridement of R flank abscess in OR on 7/30. Intraop cultures showed polymicrobial growth with proteus mirabilis x2 strains (both pan-sensitive), corynebacterium striatum and enterococcus faecalis.  * Wound vac placed per general surgery on 8/2 >> subsequently removed during stay.  * ID consulted on 8/2 and abx narrowed to Augmentin alone, completed an additional 5 days of treatment on 8/7.     -- wound per WOC RN, follows weekly  -- prns available for pain  -- cont regular repositioning     Pressure injury bilateral buttocks  * Noted by nursing on 09/02/22. WOC RN following as above.      Suspected meningioma left parietal region, incidentally seen on admission CT on 7/13  * Head CT 7/13 showed a calcified extra-axial mass overlying the left parietal region measuring 1.4 cm, potentially representing a meningioma.  * Will need serial monitoring OP after  discharge.      Anemia, suspect dilutional component: Resolved  * Hgb normal on admit. Hgb 11.5 on 7/16. No overt clinical signs of major bleeding.  * Hgb now stable at 12-13      ntertriginous dermatitis  * Chronic and stable, cont clotrimazole powder     Constipation  * Continue sched bowel regimen     Morbid obesity  * BMI 59 on admission. Recommend aggressive dietary and lifestyle modifications as condition improves     Suspected sleep apnea  * O2 drop to mid 80s overnight 8/14. Briefly placed on 4LPM, which she then removed and then slept okay with sats in 90s thereafter.  * Recommend sleep study as outpatient     Moderate malnutrition in context of acute illness and chronic disease  * Nutritionist following. Appetite poor this stay, needing encouragement to take po.      COVID-19 vaccination status  * Received first dose of vaccine on 8/28, 2nd dose due 9/18/22    FEN: no IVFs, lytes stable, regular diet w/thin liquids  DVT Prophylaxis: Lovenox 40mg subQ BID  Code Status: Full Code    Disposition: Discharge to LTC facility once placement found. SW following    Dale Prajapati MD   Hospitalist    Interval History      No acute issue reported by patient or RN. Urine output low but renal function remains normal. No urinary retention on bladder scan.    -Data reviewed today: I reviewed all active medications. I personally reviewed no images or EKG's today.    Physical Exam   Temp: 97.5  F (36.4  C) Temp src: Oral BP: 94/52 Pulse: 64   Resp: 18 SpO2: 93 % O2 Device: None (Room air)    Vitals:    09/04/22 1043 09/05/22 0700 09/08/22 0501   Weight: 141.7 kg (312 lb 8 oz) 140 kg (308 lb 9.6 oz) 138.9 kg (306 lb 3.5 oz)     Vital Signs with Ranges  Temp:  [97.5  F (36.4  C)-97.7  F (36.5  C)] 97.5  F (36.4  C)  Pulse:  [64-68] 64  Resp:  [18] 18  BP: (91-94)/(51-52) 94/52  SpO2:  [93 %-95 %] 93 %  I/O last 3 completed shifts:  In: 360 [P.O.:360]  Out: -     Constitutional: Resting comfortably, NAD  Respiratory: CTAB,  no wheeze/rales/rhonchi, no increased work of breathing  Cardiovascular: HRRR, no MGR, extremities are puffy, edema to feet  GI: S, NT, ND, +BS   Skin/Integumen: warm/dry  Other: Rt flank ulcer not examined    Medications     - MEDICATION INSTRUCTIONS -         ARIPiprazole  10 mg Oral QPM     aspirin  325 mg Oral Daily     atorvastatin  40 mg Oral QPM     COVID-19 mRNA vacc (PFIZER)  30 mcg Intramuscular Q21 Days     enoxaparin ANTICOAGULANT  40 mg Subcutaneous Q12H     furosemide  20 mg Oral BID     lisinopril  10 mg Oral Daily     miconazole   Topical BID     multivitamin w/minerals  1 tablet Oral Daily     OLANZapine  2.5 mg Oral At Bedtime     polyethylene glycol  17 g Oral Daily     potassium chloride  20 mEq Oral Daily     senna-docusate  1-2 tablet Oral BID     sertraline  50 mg Oral Daily     spironolactone  25 mg Oral Daily       Data   Recent Labs   Lab 09/12/22  1102 09/12/22  0719 09/10/22  0837 09/09/22  1727 09/08/22  0907 09/07/22  0942 09/06/22  1504 09/06/22  0937     --   --  294 287  --    < >  --    NA  --  140  --   --   --   --   --  142   POTASSIUM  --  3.5  --   --   --   --   --  3.8   CHLORIDE  --  104  --   --   --   --   --  104   CO2  --  30  --   --   --   --   --  33*   BUN  --  27  --   --   --   --   --  20   CR  --  0.70 0.74  --   --  0.73  --  0.86   ANIONGAP  --  6  --   --   --   --   --  5   ASHIA  --  8.7  --   --   --   --   --  8.6   GLC  --  99  --   --   --   --   --  115*    < > = values in this interval not displayed.       No results found for this or any previous visit (from the past 24 hour(s)).

## 2022-09-13 NOTE — PROGRESS NOTES
Pt assessment complete. Pt AAO x3 with exceptioms. Pt is fully dependent upon staff for ADLs. Dressing remains c/d/i to BLE. Pt had groin folds and breast folds cleansed and then micatin powder applied. Pt has had an uneventful night. Resting asleep in bed. No needs at this time. WCTM.

## 2022-09-13 NOTE — PLAN OF CARE
Problem: Risk for Delirium  Goal: Optimal Coping  Outcome: Met  Goal: Improved Behavioral Control  Outcome: Met  Goal: Improved Attention and Thought Clarity  Outcome: Met  Goal: Improved Sleep  Outcome: Met     Problem: Adjustment to Illness (Stroke, Ischemic/Transient Ischemic Attack)  Goal: Optimal Coping  Outcome: Met     Problem: Communication Impairment (Stroke, Ischemic/Transient Ischemic Attack)  Goal: Improved Communication Skills  Outcome: Met     Problem: Restraint, Nonbehavioral (Nonviolent)  Goal: Absence of Harm or Injury  Intervention: Protect Dignity, Rights, and Personal Wellbeing  Recent Flowsheet Documentation  Taken 9/12/2022 2052 by Andrey Gimenez RN  Trust Relationship/Rapport: care explained  Taken 9/12/2022 1939 by Andrey Gimenez RN  Trust Relationship/Rapport: care explained  Intervention: Protect Skin and Joint Integrity  Recent Flowsheet Documentation  Taken 9/12/2022 1939 by Andrey Gimenez RN  Range of Motion: ROM (range of motion) performed     Problem: Sensorimotor Impairment (Stroke, Ischemic/Transient Ischemic Attack)  Goal: Improved Sensorimotor Function  Intervention: Optimize Range of Motion, Motor Control and Function  Recent Flowsheet Documentation  Taken 9/12/2022 1939 by Andrey Gimenez RN  Range of Motion: ROM (range of motion) performed     Problem: Plan of Care - These are the overarching goals to be used throughout the patient stay.    Goal: Absence of Hospital-Acquired Illness or Injury  Intervention: Prevent and Manage VTE (Venous Thromboembolism) Risk  Recent Flowsheet Documentation  Taken 9/12/2022 1939 by Andrey Gimenez RN  Range of Motion: ROM (range of motion) performed  Goal: Optimal Comfort and Wellbeing  Intervention: Provide Person-Centered Care  Recent Flowsheet Documentation  Taken 9/12/2022 2052 by Andrey Gimenez RN  Trust Relationship/Rapport: care explained  Taken 9/12/2022 1939 by Andrey Gimenez RN  Trust Relationship/Rapport:  care explained   Goal Outcome Evaluation:

## 2022-09-13 NOTE — PROGRESS NOTES
[facility] Nurse Inpatient Assessment     Consulted for: Right lower back wound (Stage 3 HAPI) and Left medial thigh wound (DTPI HAPI)      Areas Assessed:      Areas visualized during today's visit: right skin fold, left medial thigh      Wound location: Right lower back in waist crease      Photo date: 9/13  Wound due to: Hospital Acquired Pressure injury stage 3   Stage: Unstageable 7/28, Following surgical debridement 7/30 Stage 3 Pressure Injury and Moisture Associated Skin Damage (MASD), suspect intertriginous pressure, appears to be deeper tissue damage within a crease, possible shear component from lift   Wound history/plan of care: found covered with sacral mepilex   Wound base: granulation     Palpation of the wound bed: normal       Drainage: moderate      Description of drainage: serosanguinous      Measurements (length x width x depth, in cm) 2 x 14.5 x 0.3cm      Tunneling N/A      Undermining NA  Periwound skin: necrotic tissue lifting, only one small (less than 0.5cm x 1cm) at Left lower edge of wound      Color: pink       Temperature: normal to warm and sweaty  Odor: none    Pain: none   Pain intervention prior to dressing change: none needed   Treatment goal: Heal   STATUS: improving   Supplies ordered: at bedside         Pressure Injury Location: Left Medial thigh/groin  Last photo: 9/13    Wound type: Pressure Injury     Pressure Injury Stage: Deep Tissue Pressure Injury (DTPI), hospital acquired, now HAPI stage 2 pressure injury device related purwick external catheter tubing (not the purwick device itself)   Wound history/plan of care:   Pt has had long (day 62 today 9-13-22) hospital course and unable to get up to the bathroom. Pt complaint of heavy legs and difficulty with moving independently.     Wound base: 100 % dermis      Palpation of the wound bed: normal      Drainage: none     Description of drainage: none     Measurements (length x width x depth, in  "cm) 0.7  x 9.5  x  0 cm , dermis 0.8cm x 2.5cm      Tunneling N/A     Undermining N/A  Periwound skin: Intact      Color: normal and consistent with surrounding tissue      Temperature: normal   Odor: none  Pain: moderate, tender  Pain intervention prior to dressing change: N/A  Treatment goal: Heal   STATUS: evolving  Supplies ordered: at bedside and supplies stored on unit    Treatment Plan:     Posterior waist crease: Every other day Even Days  1. Remove dressings and discard. Wet Aquacel AG to ease removal if dry still.   2. Cleanse wound and periwound skin with Vashe ( #[id number]) solution and 4x4\" gauze, pat dry   3. Apply 3M No Sting barrier wipe to periwound skin, let dry   4. Apply Dry Aquacel Ag (from unit closet) into wound bed, then cover with sacral mepilex dressing + a 4x4\" mepilex to cover whole area      Left Inner thigh/groin: Every other day and PRN  1. Clean wound with saline or MicroKlenz Spray, pat dry  2. Wipe / \"clean\" the surrounding periwound tissue with skin prep (Cavilon No Sting Skin Prep #[id number]) and allow to dry. This will help protect periwound and help dressing adherence  3. Press a Mepilex  Sacral Dressing (PS#[id number])    to the area, making sure to conform nicely to skin curvatures.   4. Time and date dressing change  NOTE  -Reposition pt side to side ever when in bed, every 2 hours-get the pt way over on side to completely offload pressure. This will benefit skin and respiratory function   -Keep heels elevated and floating on pillows at all times. Try using at least 2 pillows under each calf.  -When up to the chair pt needs to fully offload every 2 hours and use a chair cushion if needed         Orders: Reviewed    RECOMMEND PRIMARY TEAM ORDER: None, at this time  Education provided: importance of repositioning, plan of care, wound progress, Moisture management and Off-loading pressure  Discussed plan of care with: Patient and Nurse  WOC nurse follow-up plan: twice weekly  Notify WOC " if wound(s) deteriorate.  Nursing to notify the Provider(s) and re-consult the [facility] Nurse if new skin concern.    DATA:     Current support surface: Bariatric Low air loss mattress    Containment of urine/stool: Incontinence Protocol and Incontinent pad in bed  BMI: Body mass index is 49.23 kg/m .   Active diet order: Orders Placed This Encounter      Advance Diet as Tolerated      Regular Diet Adult Thin Liquids (level 0) (Upright position, alert, and assist as needed)     Output: I/O last 3 completed shifts:  In: 270 [P.O.:270]  Out: -      Labs:   No lab results found in last 7 days.    Invalid input(s): GLUCOMBO  Pressure injury risk assessment:   Sensory Perception: 2-->very limited  Moisture: 3-->occasionally moist  Activity: 2-->chairfast  Mobility: 2-->very limited  Nutrition: 2-->probably inadequate  Friction and Shear: 1-->problem  Demetrio Score: 12    Eleanor LREFREN   Dept. Pager: [phone]  Dept. Office Number: [phone]

## 2022-09-13 NOTE — PROGRESS NOTES
[facility]    Hospitalist Progress Note    Assessment & Plan   Cristy Bailey is a 77 year old female with a PMHx significant for morbid obesity, hypertension, and hyperlipidemia, who presented to [facility] 7/13/2022 with complete left-sided paralysis, left-sided facial droop, and difficulty speaking. CTA head remarkable for right MCA occlusion. She was given tenecteplase and subsequently transferred to [facility] 7/13/2022 for thrombectomy.      Hospital stay complicated by flank wound which required surgical debridement and placement of wound vac and findings LTC placement.     Acute R MCA ischemic stroke with edema and right to left midline shift  S/p tenecteplase and subsequent R MCA mechanical thrombectomy 7/13/22  * Presented to [facility] 7/13 with complete paralysis, left-sided facial droop, and aphasia. Code stroke initiated. Head CT 7/13 showed signs of an evolving R MCA infarct. CTA head 7/13 showed a right carotid terminus occlusion, right middle cerebral artery M1  segment occlusion with poor opacification of the more distal right MCA branches/poor collateral flow. CTA neck 7/13 negative for acute occlusions. Pt given tenecteplase 7/13 at 13:11. Noted to be agitated and was subsequently intubated given need for procedure.   * Subsequently transferred to [facility] 7/13/2022 where she underwent above procedure.   7/14: Extubated. Head CT 7/14 showed evolution of acute infarct involving the R MCA distribution with increased swelling, cortical effacement, and new mild right-to-left midline shift; questionable hypoattenuation involving the bilateral occipital lobes which could be artifactual.   * Additional stroke workup pursued this stay -- echo 7/14 showed EF 50%, grade 1 diastolic dysfunction   * Started ASA and atorvastatin per stroke team.   * Repeat head CT 7/15 showed evolving R MCA stroke with areas of edema, overall stable.     -- conts on full dose ASA and  statin  -- goal SBP <140/90 and met   -- monitored on telemetry for first several wks of hospital stay without abnl rhythm so can likely defer prior recommendations for 30d cardiac event monitor at discharge  -- will need to follow up with [facility] in 6-8 wks (due beginning of September)     Anxiety   Cognitive impairment, confusion as above.   * As stay has progressed, patient has consistently been oriented x2 (to self, location), unable to state the year. Also noted to have intermittent situational confusion. States family members have been visiting when they have not.   * Ongoing issues with anxiety this stay -- had been on regimen of Seroquel 12.5mg at dinner and 25mg HS with 12.5mg po q6h prn available. Mirtazapine 7.5mg on 8/15 evening given decreased appetite  * Psych consulted for assistance with ongoing mgmt -- seen on 8/16 and meds adjusted. Seroquel HS changed to Abilify 5mg HS. Recommended Zyptexa 5mg q6h prn for breakthrough insomnia/agitation while going off Seroquel. Consider uptitation of Abilify per psych recs. Advised uptitration of sertraline (25mg x3d then increase to 50mg daily beginning 8/20) and Remeron stopped. Also advised to liberalize Ativan to 0.5mg q6h prn.    * Seen by psych on 8/24, Abilify increased from 5mg to 10mg.  * PRN ativan stopped on 08/27/22 as leading to increased confusion. Hydroxyzine stopped  as well.      -- cont current regimen of meds including Abilify 5mg every evening, Zyprexa 2.5mg HS and sertraline 50mg daily  -- cont prns per psych including Zyprexa 5mg q6h prn     Urinary retention: Resolved  * Retaining urine on 8/14. UA WNL, not worrisome for infection  * Requiring straight cath x1 on 8/20 PM  * Good UOP, no longer requiring straight cath. No evidence of obstruction on PVR.      Dysphagia due to CVA: Resolved  * Seen by SLP and noted with dysphagia. Initially required some intermittent fluid boluses.   * Was eventually able to advance to regular diet w/thin  liquids     Dyslipidemia  * FLP this stay showed tot cholest 163, HDL 47, LDL 91, .   * Started on atorvastatin 40 mg daily     Volume overload dt diastolic CHF exacerbation: Improved  Essential hypertension  Hypokalemia, recurrent on lasix, and often refusing potassium   * Not on/needing meds PTA.   * As above, echo this stay showed EF 50% with grade I diastolic dysfunction; RV not well visualized but appeared mild-moderately dilated with global systolic function probably mildly reduced.   * BPs were initially soft this stay and required IVFs. BPs improved.   * Developed pedal edema required IV Lasix. Ultimately transition to oral Lasix.  * Started on lisinopril this stay  * BPs improved during stay.   * Lasix decreased from 40mg in AM and 20mg in PM to 20mg BID on 9/3 and added spironolactone 25mg daily    -- cont lisinopril 10mg daily  -- cont Lasix 20mg po BID  -- cont spironolactone 25mg daily  -- cont KCl 20mEq po daily     Hypophosphatemia: Resolved     Prolonged QTc   * EKG on 7/13 showed QTc 494.   * Repeat EKG on 7/30 (while on Levaquin) showed QTc table at 475. Has since completed course of abx as below.  * Had been monitoring on telemetry this stay. No concerning findings so tele was ultimately dc'd 8/19     Chronic bilateral LE edema with chronic venous stasis dermatitis and chronic skin changes  Hx of LLE cellulitis  * Was hospitalized in 11/2021 for sepsis dt to pneumonia and LLE cellulitis.   * During this stay, BLE noted to be patty and edematous, no unilateral leg swelling appreciated; LLE had patchy areas of erythema, no fluctuance, no painful areas with palpation; legs warm to touch. Lymphedema consulted.   -- cont LE elevation, lymphedema wraps     Lower back/R flank wound/abscess, suspect dt pressure type injury, s/p surgical debridement on 7/30/22 and placement of wound vac on 8/2/22,  OA  Hx of bilateral hip replacements   Hx of chronic neck and back pain  * Pt with significant back  pain early on in hospital stay. Was requiring IV hydromorphone.  Dose had to be decreased dt somnolence.  * MRI L-spine in 2018 showed multilevel degenerative changes with mild central stenosis. Patient has chronic back pain, reports having it over the last 2 years.   * During this stay, patient was noted with 5-6cm by 2-3cm wound with swelling/fluid/blistering in a fold of her lower back, did not appear infected; this seemed to be the source of her pain. Padded dressing placed and WOC RN ordered. Pain initially improved.   * Was placed on course of Augmentin on 7/24.   * On 7/26, was re-evaluated by WOC RN. Wound appeared more erythematous and purulence expressed. Worsening with shear stress from lift. Procal <0.05, WBC WNL. Abx changed to IV clindamycin.  * Wound cultures obtained. On 7/28, wound grew proteus and staph aureus (MRSA neg). US neg for abscess.   * Lost IV access on 7/28 so abx were changed to oral clindamycin and oral levaquin. IV access re-established but was continued on oral abx.  * General surgery consulted, ultimately underwent excisional debridement of R flank abscess in OR on 7/30. Intraop cultures showed polymicrobial growth with proteus mirabilis x2 strains (both pan-sensitive), corynebacterium striatum and enterococcus faecalis.  * Wound vac placed per general surgery on 8/2 >> subsequently removed during stay.  * ID consulted on 8/2 and abx narrowed to Augmentin alone, completed an additional 5 days of treatment on 8/7.     -- wound per WOC RN, follows weekly  -- prns available for pain  -- cont regular repositioning     Pressure injury bilateral buttocks  * Noted by nursing on 09/02/22. WOC RN following as above.      Suspected meningioma left parietal region, incidentally seen on admission CT on 7/13  * Head CT 7/13 showed a calcified extra-axial mass overlying the left parietal region measuring 1.4 cm, potentially representing a meningioma.  * Will need serial monitoring OP after  discharge.      Anemia, suspect dilutional component: Resolved  * Hgb normal on admit. Hgb 11.5 on 7/16. No overt clinical signs of major bleeding.  * Hgb now stable at 12-13      ntertriginous dermatitis  * Chronic and stable, cont clotrimazole powder     Constipation  * Continue sched bowel regimen     Morbid obesity  * BMI 59 on admission. Recommend aggressive dietary and lifestyle modifications as condition improves     Suspected sleep apnea  * O2 drop to mid 80s overnight 8/14. Briefly placed on 4LPM, which she then removed and then slept okay with sats in 90s thereafter.  * Recommend sleep study as outpatient     Moderate malnutrition in context of acute illness and chronic disease  * Nutritionist following. Appetite poor this stay, needing encouragement to take po.      COVID-19 vaccination status  * Received first dose of vaccine on 8/28, 2nd dose due 9/18/22    FEN: no IVFs, lytes stable, regular diet w/thin liquids  DVT Prophylaxis: Lovenox 40mg subQ BID  Code Status: Full Code    Disposition: Discharge to LTC facility once placement found. SW following    Dale Prajapati MD   Hospitalist    Interval History      No acute issue reported by patient or RN. Pending discharge to LTC.    -Data reviewed today: I reviewed all active medications. I personally reviewed no images or EKG's today.    Physical Exam   Temp: 97.7  F (36.5  C) Temp src: Oral BP: 108/64 Pulse: 76   Resp: 16 SpO2: 96 % O2 Device: None (Room air)    Vitals:    09/05/22 0700 09/08/22 0501 09/13/22 0458   Weight: 140 kg (308 lb 9.6 oz) 138.9 kg (306 lb 3.5 oz) 138.3 kg (305 lb)     Vital Signs with Ranges  Temp:  [97.1  F (36.2  C)-97.9  F (36.6  C)] 97.7  F (36.5  C)  Pulse:  [61-78] 76  Resp:  [16-18] 16  BP: ()/(51-64) 108/64  SpO2:  [92 %-96 %] 96 %  I/O last 3 completed shifts:  In: 270 [P.O.:270]  Out: -     Constitutional: Resting comfortably, NAD  Respiratory: CTAB, no wheeze/rales/rhonchi, no increased work of  breathing  Cardiovascular: HRRR, no MGR, extremities are puffy, edema to feet  GI: S, NT, ND, +BS   Skin/Integumen: warm/dry  Other: Rt flank ulcer not examined    Medications     - MEDICATION INSTRUCTIONS -         ARIPiprazole  10 mg Oral QPM     aspirin  325 mg Oral Daily     atorvastatin  40 mg Oral QPM     COVID-19 mRNA vacc (PFIZER)  30 mcg Intramuscular Q21 Days     enoxaparin ANTICOAGULANT  40 mg Subcutaneous Q12H     furosemide  20 mg Oral BID     lisinopril  10 mg Oral Daily     miconazole   Topical BID     multivitamin w/minerals  1 tablet Oral Daily     OLANZapine  2.5 mg Oral At Bedtime     polyethylene glycol  17 g Oral Daily     potassium chloride  20 mEq Oral Daily     senna-docusate  1-2 tablet Oral BID     sertraline  50 mg Oral Daily     spironolactone  25 mg Oral Daily       Data   Recent Labs   Lab 09/12/22  1102 09/12/22  0719 09/10/22  0837 09/09/22  1727 09/08/22  0907 09/07/22  0942     --   --  294 287  --    NA  --  140  --   --   --   --    POTASSIUM  --  3.5  --   --   --   --    CHLORIDE  --  104  --   --   --   --    CO2  --  30  --   --   --   --    BUN  --  27  --   --   --   --    CR  --  0.70 0.74  --   --  0.73   ANIONGAP  --  6  --   --   --   --    ASHIA  --  8.7  --   --   --   --    GLC  --  99  --   --   --   --        No results found for this or any previous visit (from the past 24 hour(s)).

## 2022-09-14 NOTE — PROGRESS NOTES
Patient's AM Lisinopril dose held due to low BP. Patient is asymptomatic.  Encouraged Fluid hydration. Will continue to monitor.

## 2022-09-14 NOTE — PROGRESS NOTES
[facility]    Medicine Progress Note - Hospitalist Service    Date of Admission:  7/13/2022    Assessment & Plan          Cristy Bailey is a 77 year old female with a PMHx significant for morbid obesity, hypertension, and hyperlipidemia, who presented to [facility] 7/13/2022 with complete left-sided paralysis, left-sided facial droop, and difficulty speaking. CTA head remarkable for right MCA occlusion. She was given tenecteplase and subsequently transferred to [facility] 7/13/2022 for thrombectomy.      Hospital stay complicated by flank wound which required surgical debridement and placement of wound vac and findings LTC placement.     Acute R MCA ischemic stroke with edema and right to left midline shift  S/p tenecteplase and subsequent R MCA mechanical thrombectomy 7/13/22  * Presented to [facility] 7/13 with complete paralysis, left-sided facial droop, and aphasia. Code stroke initiated. Head CT 7/13 showed signs of an evolving R MCA infarct. CTA head 7/13 showed a right carotid terminus occlusion, right middle cerebral artery M1  segment occlusion with poor opacification of the more distal right MCA branches/poor collateral flow. CTA neck 7/13 negative for acute occlusions. Pt given tenecteplase 7/13 at 13:11. Noted to be agitated and was subsequently intubated given need for procedure.   * Subsequently transferred to [facility] 7/13/2022 where she underwent above procedure.   7/14: Extubated. Head CT 7/14 showed evolution of acute infarct involving the R MCA distribution with increased swelling, cortical effacement, and new mild right-to-left midline shift; questionable hypoattenuation involving the bilateral occipital lobes which could be artifactual.   * Additional stroke workup pursued this stay -- echo 7/14 showed EF 50%, grade 1 diastolic dysfunction   * Started ASA and atorvastatin per stroke team.   * Repeat head CT 7/15 showed evolving R MCA stroke with areas of  edema, overall stable.     -- conts on full dose ASA and statin  -- goal SBP <140/90 and met   -- monitored on telemetry for first several wks of hospital stay without abnl rhythm so can likely defer prior recommendations for 30d cardiac event monitor at discharge  -- will need to follow up with [facility] in 6-8 wks (due beginning of September)     Anxiety   Cognitive impairment, confusion as above.   * As stay has progressed, patient has consistently been oriented x2 (to self, location), unable to state the year. Also noted to have intermittent situational confusion. States family members have been visiting when they have not.   * Ongoing issues with anxiety this stay -- had been on regimen of Seroquel 12.5mg at dinner and 25mg HS with 12.5mg po q6h prn available. Mirtazapine 7.5mg on 8/15 evening given decreased appetite  * Psych consulted for assistance with ongoing mgmt -- seen on 8/16 and meds adjusted. Seroquel HS changed to Abilify 5mg HS. Recommended Zyptexa 5mg q6h prn for breakthrough insomnia/agitation while going off Seroquel. Consider uptitation of Abilify per psych recs. Advised uptitration of sertraline (25mg x3d then increase to 50mg daily beginning 8/20) and Remeron stopped. Also advised to liberalize Ativan to 0.5mg q6h prn.    * Seen by psych on 8/24, Abilify increased from 5mg to 10mg.  * PRN ativan stopped on 08/27/22 as leading to increased confusion. Hydroxyzine stopped  as well.      -- cont current regimen of meds including Abilify 5mg every evening, Zyprexa 2.5mg HS and sertraline 50mg daily  -- cont prns per psych including Zyprexa 5mg q6h prn     Urinary retention: Resolved  * Retaining urine on 8/14. UA WNL, not worrisome for infection  * Requiring straight cath x1 on 8/20 PM  * Good UOP, no longer requiring straight cath. No evidence of obstruction on PVR.      Dysphagia due to CVA: Resolved  * Seen by SLP and noted with dysphagia. Initially required some intermittent fluid boluses.   * Was  eventually able to advance to regular diet w/thin liquids     Dyslipidemia  * FLP this stay showed tot cholest 163, HDL 47, LDL 91, .   * Started on atorvastatin 40 mg daily     Volume overload dt diastolic CHF exacerbation: Improved  Essential hypertension  Hypokalemia, recurrent on lasix, and often refusing potassium   * Not on/needing meds PTA.   * As above, echo this stay showed EF 50% with grade I diastolic dysfunction; RV not well visualized but appeared mild-moderately dilated with global systolic function probably mildly reduced.   * BPs were initially soft this stay and required IVFs. BPs improved.   * Developed pedal edema required IV Lasix. Ultimately transition to oral Lasix.  * Started on lisinopril this stay  * BPs improved during stay.   * Lasix decreased from 40mg in AM and 20mg in PM to 20mg BID on 9/3 and added spironolactone 25mg daily    -- soft bp this am and lisinopril held this am  -- reduced lisinopril dose to 5mg daily with parameters on 9/14  -- cont Lasix 20mg po BID  -- reduced spironolactone to 12.5mg daily with parameters on 9/14  -- cont KCl 20mEq po daily  -- f/u bp and adjust closely as indicated     Hypophosphatemia: Resolved     Prolonged QTc   * EKG on 7/13 showed QTc 494.   * Repeat EKG on 7/30 (while on Levaquin) showed QTc table at 475. Has since completed course of abx as below.  * Had been monitoring on telemetry this stay. No concerning findings so tele was ultimately dc'd 8/19     Chronic bilateral LE edema with chronic venous stasis dermatitis and chronic skin changes  Hx of LLE cellulitis  * Was hospitalized in 11/2021 for sepsis dt to pneumonia and LLE cellulitis.   * During this stay, BLE noted to be patty and edematous, no unilateral leg swelling appreciated; LLE had patchy areas of erythema, no fluctuance, no painful areas with palpation; legs warm to touch. Lymphedema consulted.   -- cont LE elevation, lymphedema wraps     Lower back/R flank wound/abscess,  suspect dt pressure type injury, s/p surgical debridement on 7/30/22 and placement of wound vac on 8/2/22,  OA  Hx of bilateral hip replacements   Hx of chronic neck and back pain  * Pt with significant back pain early on in hospital stay. Was requiring IV hydromorphone.  Dose had to be decreased dt somnolence.  * MRI L-spine in 2018 showed multilevel degenerative changes with mild central stenosis. Patient has chronic back pain, reports having it over the last 2 years.   * During this stay, patient was noted with 5-6cm by 2-3cm wound with swelling/fluid/blistering in a fold of her lower back, did not appear infected; this seemed to be the source of her pain. Padded dressing placed and WOC RN ordered. Pain initially improved.   * Was placed on course of Augmentin on 7/24.   * On 7/26, was re-evaluated by WOC RN. Wound appeared more erythematous and purulence expressed. Worsening with shear stress from lift. Procal <0.05, WBC WNL. Abx changed to IV clindamycin.  * Wound cultures obtained. On 7/28, wound grew proteus and staph aureus (MRSA neg). US neg for abscess.   * Lost IV access on 7/28 so abx were changed to oral clindamycin and oral levaquin. IV access re-established but was continued on oral abx.  * General surgery consulted, ultimately underwent excisional debridement of R flank abscess in OR on 7/30. Intraop cultures showed polymicrobial growth with proteus mirabilis x2 strains (both pan-sensitive), corynebacterium striatum and enterococcus faecalis.  * Wound vac placed per general surgery on 8/2 >> subsequently removed during stay.  * ID consulted on 8/2 and abx narrowed to Augmentin alone, completed an additional 5 days of treatment on 8/7.      -- wound per WOC RN, follows weekly  -- prns available for pain  -- cont regular repositioning     Pressure Injury, left Medial thigh/groin  Pressure Injury Stage: Deep Tissue Pressure Injury (DTPI), hospital acquired, now HAPI stage 2 pressure injury device  related purwick external catheter tubing (not the purwick device itself)  - Wound care following    Pressure injury bilateral buttocks  * Noted by nursing on 09/02/22. WOC RN following as above.      Suspected meningioma left parietal region, incidentally seen on admission CT on 7/13  * Head CT 7/13 showed a calcified extra-axial mass overlying the left parietal region measuring 1.4 cm, potentially representing a meningioma.  * Will need serial monitoring OP after discharge.      Anemia, suspect dilutional component: Resolved  * Hgb normal on admit. Hgb 11.5 on 7/16. No overt clinical signs of major bleeding.  * Hgb now stable at 12-13      ntertriginous dermatitis  * Chronic and stable, cont clotrimazole powder     Constipation  * Continue sched bowel regimen     Morbid obesity  * BMI 59 on admission. Recommend aggressive dietary and lifestyle modifications as condition improves     Suspected sleep apnea  * O2 drop to mid 80s overnight 8/14. Briefly placed on 4LPM, which she then removed and then slept okay with sats in 90s thereafter.  * Recommend sleep study as outpatient     Moderate malnutrition in context of acute illness and chronic disease  * Nutritionist following. Appetite poor this stay, needing encouragement to take po.      COVID-19 vaccination status  * Received first dose of vaccine on 8/28, 2nd dose due 9/18/22       Severe malnutrition. In Context of:  Acute illness or injury  Chronic illness or disease  Malnutrition: (8/31)   % Weight Loss:  > 5% in 1 month (severe malnutrition) - significant loss over this admit  % Intake:  </= 50% for >/= 5 days (severe malnutrition) - consistent this admit x49 days  Subcutaneous Fat Loss:  Orbital region moderate depletion - visual, per 8/16 note  Muscle Loss:  Temporal region moderate depletion - visual, per 8/16 note  Fluid Retention:  Mild generalized edema  - dietitian following        Diet: Advance Diet as Tolerated  Regular Diet Adult Thin Liquids (level  0) (Upright position, alert, and assist as needed)  Room Service    DVT Prophylaxis: Enoxaparin (Lovenox) SQ  [place] Catheter: Not present  Central Lines: None  Cardiac Monitoring: None  Code Status: Full Code      Disposition Plan     Expected Discharge Date: 09/19/2022    Discharge Delays: Placement - LTC  Covid Vaccine for Placement  *Medically Ready for Discharge    Discharge Comments: . Writer resent referrals to [facility], [facility], [facility], [facility] and [facility]. Writer also sent a referral to matheus Hua and . Will get 2nd dose of covid vaccine later this month.        The patient's care was discussed with the Bedside Nurse, Care Coordinator/ and Patient.    Lola Arias MD  Hospitalist Service  [facility]  Securely message with the Vocera Web Console (learn more here)  Text page via Mavenlink Paging/Directory         Clinically Significant Risk Factors Present on Admission                      ______________________________________________________________________    Interval History   Patient intermittently calling out per discussion with RN. This morning, she is calm and cooperative.   BP soft this morning and lisinopril held this morning. Denies chest pain, lightheadedness or shortness of breath.     Data reviewed today: I reviewed all medications, new labs and imaging results over the last 24 hours. I personally reviewed no images or EKG's today.    Physical Exam   Vital Signs: Temp: 97  F (36.1  C) Temp src: Axillary BP: 100/47 Pulse: 72   Resp: 18 SpO2: 91 % O2 Device: None (Room air)    Weight: 305 lbs 0 oz  General Appearance: Alert, awake and no apparent distress  Respiratory: CTAB, no wheezing  Cardiovascular: regular rate and rhythm  GI: soft and non-tender  Skin: warm and dry      Data   Recent Labs   Lab 09/12/22  1102 09/12/22  0719 09/10/22  0837 09/09/22  1727 09/08/22  0907     --   --  294  287   NA  --  140  --   --   --    POTASSIUM  --  3.5  --   --   --    CHLORIDE  --  104  --   --   --    CO2  --  30  --   --   --    BUN  --  27  --   --   --    CR  --  0.70 0.74  --   --    ANIONGAP  --  6  --   --   --    ASHIA  --  8.7  --   --   --    GLC  --  99  --   --   --      No results found for this or any previous visit (from the past 24 hour(s)).  Medications     - MEDICATION INSTRUCTIONS -         ARIPiprazole  10 mg Oral QPM     aspirin  325 mg Oral Daily     atorvastatin  40 mg Oral QPM     COVID-19 mRNA vacc (PFIZER)  30 mcg Intramuscular Q21 Days     enoxaparin ANTICOAGULANT  40 mg Subcutaneous Q12H     furosemide  20 mg Oral BID     [START ON 9/15/2022] lisinopril  5 mg Oral Daily     miconazole   Topical BID     multivitamin w/minerals  1 tablet Oral Daily     OLANZapine  2.5 mg Oral At Bedtime     polyethylene glycol  17 g Oral Daily     potassium chloride  20 mEq Oral Daily     senna-docusate  1-2 tablet Oral BID     sertraline  50 mg Oral Daily     [START ON 9/15/2022] spironolactone  12.5 mg Oral Daily

## 2022-09-14 NOTE — PROGRESS NOTES
"CLINICAL NUTRITION SERVICES - REASSESSMENT NOTE    RECOMMENDATIONS FOR MD/PROVIDER TO ORDER:   - Consider nutrition support given ongoing very very poor PO (0-25%, often refuses meals, supplements not accepted by patient)    Future/Additional Recommendations:   - Encourage Thera vit m daily. Encourage PO at mealtimes.   - Continue room service assistance    Note pt has tried all supplements, dislikes and refuses all.    Malnutrition: (8/31)   % Weight Loss:  > 5% in 1 month (severe malnutrition) - significant loss over this admit  % Intake:  </= 50% for >/= 5 days (severe malnutrition) - consistent this admit x49 days  Subcutaneous Fat Loss:  Orbital region moderate depletion - visual, per 8/16 note  Muscle Loss:  Temporal region moderate depletion - visual, per 8/16 note  Fluid Retention:  Mild generalized edema     Malnutrition Diagnosis: Severe malnutrition  In Context of:  Acute illness or injury  Chronic illness or disease     EVALUATION OF PROGRESS TOWARD GOALS   Diet: Regular diet + Thin liquids  Room Service     Intake/Tolerance:   - Intakes remain low, 0-50% at best the past week.   - Receives meals TID, some are a bit larger with an entree and sides, some are just cereal.   - Pt seen in room. She says she is eating \"normally\". Had cereal and a banana for breakfast. (though meal review shows only cheerios and milk ordered)   - Again declined any supplements.     - Labs: reviewed  - Weight: 138.3 kg (305 lb) 9/13 - lowest of admission. Some diuresis, some r/t dry mass.   - Stooling: BM x1 yesterday; last BM x1 on 9/9.    Meds: Lasix BID, Thera vit M daily, miralax (held), senokot (givent today)  - noted pt is taking her Thera vit M more consistently the past 5 days.       ASSESSED NUTRITION NEEDS:  Dosing Weight: 89.7 kg (adjusted)  Estimated Energy Needs: 5611-3379 kcals (15-20 Kcal/Kg)  Justification: maintenance r/t BMI  Estimated Protein Needs: 108-135 grams protein (1.2-1.5 g " pro/Kg)  Justification: wound healing, obesity guidelines, preservation of lean body mass and increased needs r/t age  Estimated Fluid Needs: (1 mL/Kcal)  Justification: maintenance and per provider pending fluid status    NEW FINDINGS:   - discharge planned once pt receives required COVID vaccinations   - WOCN reassessment yesterday for PI stage 3 and DTPI. See note for details.     Previous Goals:   Patient to consume >50% of nutritionally adequate meals TID over next 5-7 days vs start nutrition support.  Evaluation: Not met    Previous Nutrition Diagnosis:   Inadequate oral intake related to poor appetite, disinterest in eating, and increased protein needs for wound healing as evidenced by 0-25% of meals consistently over 56 day admission  Evaluation: No change    CURRENT NUTRITION DIAGNOSIS  Inadequate oral intake related to poor appetite, disinterest in eating, and increased protein needs for wound healing as evidenced by 0-50% of meals consistently over 63 day admission    INTERVENTIONS  Recommendations / Nutrition Prescription  Continue regular diet  Continue encouragement for Thera vit M daily  Consider nutrition support given ongoing very very poor PO (0-25%, often refuses meals, supplements not accepted by patient)     Implementation  None new    Goals  Patient to consume >50% of nutritionally adequate meals BID-TID over next 5-7 days vs start nutrition support.     MONITORING AND EVALUATION:  Progress towards goals will be monitored and evaluated per protocol and Practice Guidelines    Nicole Mitchell RD, LD  [facility] [place], [facility], [facility], [facility]  Pager: [phone]  Weekend Pager: [phone]

## 2022-09-14 NOTE — PROGRESS NOTES
Pt assessment complete. Pt AAO x3 with exceptioms. Pt is fully dependent upon staff for ADLs. Dressing remains c/d/i to BLE. Pt had groin folds and breast folds cleansed and then micatin powder applied. Pt turned q2 using lift. Pt has had an uneventful night, did have a few outbursts. Resting asleep in bed. No needs at this time. WCTM.

## 2022-09-14 NOTE — PLAN OF CARE
Problem: Risk for Delirium  Goal: Improved Behavioral Control  Outcome: Ongoing, Progressing  Goal: Improved Attention and Thought Clarity  Outcome: Ongoing, Progressing     Problem: Risk for Delirium  Goal: Improved Sleep  Outcome: Met   Goal Outcome Evaluation:

## 2022-09-15 NOTE — PROGRESS NOTES
[facility]    Medicine Progress Note - Hospitalist Service    Date of Admission:  7/13/2022    Assessment & Plan                     Cristy Bailey is a 77 year old female with a PMHx significant for morbid obesity, hypertension, and hyperlipidemia, who presented to [facility] 7/13/2022 with complete left-sided paralysis, left-sided facial droop, and difficulty speaking. CTA head remarkable for right MCA occlusion. She was given tenecteplase and subsequently transferred to [facility] 7/13/2022 for thrombectomy.      Hospital stay complicated by flank wound which required surgical debridement and placement of wound vac and findings LTC placement.     Acute R MCA ischemic stroke with edema and right to left midline shift  S/p tenecteplase and subsequent R MCA mechanical thrombectomy 7/13/22  * Presented to [facility] 7/13 with complete paralysis, left-sided facial droop, and aphasia. Code stroke initiated. Head CT 7/13 showed signs of an evolving R MCA infarct. CTA head 7/13 showed a right carotid terminus occlusion, right middle cerebral artery M1  segment occlusion with poor opacification of the more distal right MCA branches/poor collateral flow. CTA neck 7/13 negative for acute occlusions. Pt given tenecteplase 7/13 at 13:11. Noted to be agitated and was subsequently intubated given need for procedure.   * Subsequently transferred to [facility] 7/13/2022 where she underwent above procedure.   7/14: Extubated. Head CT 7/14 showed evolution of acute infarct involving the R MCA distribution with increased swelling, cortical effacement, and new mild right-to-left midline shift; questionable hypoattenuation involving the bilateral occipital lobes which could be artifactual.   * Additional stroke workup pursued this stay -- echo 7/14 showed EF 50%, grade 1 diastolic dysfunction   * Started ASA and atorvastatin per stroke team.   * Repeat head CT 7/15 showed evolving R MCA stroke with  areas of edema, overall stable.     -- conts on full dose ASA and statin  -- goal SBP <140/90 and met   -- monitored on telemetry for first several wks of hospital stay without abnl rhythm so can likely defer prior recommendations for 30d cardiac event monitor at discharge  -- will need to follow up with [facility] in 6-8 wks (due beginning of September)  Waiting on placement    Anxiety   Cognitive impairment, confusion as above.   * As stay has progressed, patient has consistently been oriented x2 (to self, location), unable to state the year. Also noted to have intermittent situational confusion. States family members have been visiting when they have not.   * Ongoing issues with anxiety this stay -- had been on regimen of Seroquel 12.5mg at dinner and 25mg HS with 12.5mg po q6h prn available. Mirtazapine 7.5mg on 8/15 evening given decreased appetite  * Psych consulted for assistance with ongoing mgmt -- seen on 8/16 and meds adjusted. Seroquel HS changed to Abilify 5mg HS. Recommended Zyptexa 5mg q6h prn for breakthrough insomnia/agitation while going off Seroquel. Consider uptitation of Abilify per psych recs. Advised uptitration of sertraline (25mg x3d then increase to 50mg daily beginning 8/20) and Remeron stopped. Also advised to liberalize Ativan to 0.5mg q6h prn.    * Seen by psych on 8/24, Abilify increased from 5mg to 10mg.  * PRN ativan stopped on 08/27/22 as leading to increased confusion. Hydroxyzine stopped  as well.      -- cont current regimen of meds including Abilify 5mg every evening, Zyprexa 2.5mg HS and sertraline 50mg daily  -- cont prns per psych including Zyprexa 5mg q6h prn     Urinary retention: Resolved  * Retaining urine on 8/14. UA WNL, not worrisome for infection  * Requiring straight cath x1 on 8/20 PM  * Good UOP, no longer requiring straight cath. No evidence of obstruction on PVR.      Dysphagia due to CVA: Resolved  * Seen by SLP and noted with dysphagia. Initially required some  intermittent fluid boluses.   * Was eventually able to advance to regular diet w/thin liquids     Dyslipidemia  * FLP this stay showed tot cholest 163, HDL 47, LDL 91, .   * Started on atorvastatin 40 mg daily     Volume overload dt diastolic CHF exacerbation: Improved  Essential hypertension  Hypokalemia, recurrent on lasix, and often refusing potassium   * Not on/needing meds PTA.   * As above, echo this stay showed EF 50% with grade I diastolic dysfunction; RV not well visualized but appeared mild-moderately dilated with global systolic function probably mildly reduced.   * BPs were initially soft this stay and required IVFs. BPs improved.   * Developed pedal edema required IV Lasix. Ultimately transition to oral Lasix.  * Started on lisinopril this stay  * BPs improved during stay.   * Lasix decreased from 40mg in AM and 20mg in PM to 20mg BID on 9/3 and added spironolactone 25mg daily    -- soft bp this am and lisinopril held this am  -- reduced lisinopril dose to 5mg daily with parameters on 9/14, will hold lisinopril completely for now as she needs diuretics  -- cont Lasix 20mg po BID  -- reduced spironolactone to 12.5mg daily with parameters on 9/14  -- cont KCl 20mEq po daily  -- f/u bp and adjust closely as indicated     Hypophosphatemia: Resolved     Prolonged QTc   * EKG on 7/13 showed QTc 494.   * Repeat EKG on 7/30 (while on Levaquin) showed QTc table at 475. Has since completed course of abx as below.  * Had been monitoring on telemetry this stay. No concerning findings so tele was ultimately dc'd 8/19     Chronic bilateral LE edema with chronic venous stasis dermatitis and chronic skin changes  Hx of LLE cellulitis  * Was hospitalized in 11/2021 for sepsis dt to pneumonia and LLE cellulitis.   * During this stay, BLE noted to be patty and edematous, no unilateral leg swelling appreciated; LLE had patchy areas of erythema, no fluctuance, no painful areas with palpation; legs warm to touch.  Lymphedema consulted.   -- cont LE elevation, lymphedema wraps     Lower back/R flank wound/abscess, suspect dt pressure type injury, s/p surgical debridement on 7/30/22 and placement of wound vac on 8/2/22,  OA  Hx of bilateral hip replacements   Hx of chronic neck and back pain  * Pt with significant back pain early on in hospital stay. Was requiring IV hydromorphone.  Dose had to be decreased dt somnolence.  * MRI L-spine in 2018 showed multilevel degenerative changes with mild central stenosis. Patient has chronic back pain, reports having it over the last 2 years.   * During this stay, patient was noted with 5-6cm by 2-3cm wound with swelling/fluid/blistering in a fold of her lower back, did not appear infected; this seemed to be the source of her pain. Padded dressing placed and WOC RN ordered. Pain initially improved.   * Was placed on course of Augmentin on 7/24.   * On 7/26, was re-evaluated by WOC RN. Wound appeared more erythematous and purulence expressed. Worsening with shear stress from lift. Procal <0.05, WBC WNL. Abx changed to IV clindamycin.  * Wound cultures obtained. On 7/28, wound grew proteus and staph aureus (MRSA neg). US neg for abscess.   * Lost IV access on 7/28 so abx were changed to oral clindamycin and oral levaquin. IV access re-established but was continued on oral abx.  * General surgery consulted, ultimately underwent excisional debridement of R flank abscess in OR on 7/30. Intraop cultures showed polymicrobial growth with proteus mirabilis x2 strains (both pan-sensitive), corynebacterium striatum and enterococcus faecalis.  * Wound vac placed per general surgery on 8/2 >> subsequently removed during stay.  * ID consulted on 8/2 and abx narrowed to Augmentin alone, completed an additional 5 days of treatment on 8/7.      -- wound per WOC RN, follows weekly  -- prns available for pain  -- cont regular repositioning     Pressure Injury, left Medial thigh/groin  Pressure Injury Stage:  Deep Tissue Pressure Injury (DTPI), hospital acquired, now HAPI stage 2 pressure injury device related purwick external catheter tubing (not the purwick device itself)  - Wound care following    Pressure injury bilateral buttocks  * Noted by nursing on 09/02/22. WOC RN following as above.      Suspected meningioma left parietal region, incidentally seen on admission CT on 7/13  * Head CT 7/13 showed a calcified extra-axial mass overlying the left parietal region measuring 1.4 cm, potentially representing a meningioma.  * Will need serial monitoring OP after discharge.      Anemia, suspect dilutional component: Resolved  * Hgb normal on admit. Hgb 11.5 on 7/16. No overt clinical signs of major bleeding.  * Hgb now stable at 12-13      ntertriginous dermatitis  * Chronic and stable, cont clotrimazole powder     Constipation  * Continue sched bowel regimen     Morbid obesity  * BMI 59 on admission. Recommend aggressive dietary and lifestyle modifications as condition improves     Suspected sleep apnea  * O2 drop to mid 80s overnight 8/14. Briefly placed on 4LPM, which she then removed and then slept okay with sats in 90s thereafter.  * Recommend sleep study as outpatient     Moderate malnutrition in context of acute illness and chronic disease  * Nutritionist following. Appetite poor this stay, needing encouragement to take po.      COVID-19 vaccination status  * Received first dose of vaccine on 8/28, 2nd dose due 9/18/22       Severe malnutrition. In Context of:  Acute illness or injury  Chronic illness or disease  Malnutrition: (8/31)   % Weight Loss:  > 5% in 1 month (severe malnutrition) - significant loss over this admit  % Intake:  </= 50% for >/= 5 days (severe malnutrition) - consistent this admit x49 days  Subcutaneous Fat Loss:  Orbital region moderate depletion - visual, per 8/16 note  Muscle Loss:  Temporal region moderate depletion - visual, per 8/16 note  Fluid Retention:  Mild generalized edema  -  dietitian following          Diet: Advance Diet as Tolerated  Regular Diet Adult Thin Liquids (level 0) (Upright position, alert, and assist as needed)  Room Service    DVT Prophylaxis: Enoxaparin (Lovenox) SQ  [place] Catheter: Not present  Central Lines: None  Cardiac Monitoring: None  Code Status: Full Code      Disposition Plan      Expected Discharge Date: 09/19/2022    Discharge Delays: Placement - LTC  Covid Vaccine for Placement  *Medically Ready for Discharge    Discharge Comments: . Writer resent referrals to [facility], [facility], [facility], [facility] and [facility]. Writer also sent a referral to matheus [facility] and . Will get 2nd dose of covid vaccine later this month.        The patient's care was discussed with the Patient.    Rox Chen MD  Hospitalist Service  [facility]  Securely message with the Vocera Web Console (learn more here)  Text page via [employer] Paging/Directory         Clinically Significant Risk Factors Present on Admission                      ______________________________________________________________________    Interval History     Denies any headache or nausea or vomiting.  Complains of feeling cold      Data reviewed today: I reviewed all medications, new labs and imaging results over the last 24 hours. I personally reviewed no images or EKG's today.    Physical Exam   Vital Signs: Temp: 97.5  F (36.4  C) Temp src: Oral BP: 99/58 Pulse: 72   Resp: 17 SpO2: 95 % O2 Device: None (Room air)    Weight: 301 lbs 11.2 oz  General Appearance: Obese , no distress, appears drowsy but answers questions appropriately  Respiratory: Lungs clear  Cardiovascular: Rate controlled  GI: Nontender  Skin: No rash  Extremities: No edema      Data   Recent Labs   Lab 09/15/22  1050 09/14/22  1759 09/12/22  1102 09/12/22  0719 09/10/22  0837 09/09/22  1727     --  314  --   --  294   NA  --   --   --  140  --   --     POTASSIUM  --  3.5  --  3.5  --   --    CHLORIDE  --   --   --  104  --   --    CO2  --   --   --  30  --   --    BUN  --   --   --  27  --   --    CR  --   --   --  0.70 0.74  --    ANIONGAP  --   --   --  6  --   --    ASHIA  --   --   --  8.7  --   --    GLC  --   --   --  99  --   --      No results found for this or any previous visit (from the past 24 hour(s)).  Medications     - MEDICATION INSTRUCTIONS -         ARIPiprazole  10 mg Oral QPM     aspirin  325 mg Oral Daily     atorvastatin  40 mg Oral QPM     COVID-19 mRNA vacc (PFIZER)  30 mcg Intramuscular Q21 Days     enoxaparin ANTICOAGULANT  40 mg Subcutaneous Q12H     furosemide  20 mg Oral BID     lisinopril  5 mg Oral Daily     miconazole   Topical BID     multivitamin w/minerals  1 tablet Oral Daily     OLANZapine  2.5 mg Oral At Bedtime     polyethylene glycol  17 g Oral Daily     potassium chloride  20 mEq Oral Daily     senna-docusate  1-2 tablet Oral BID     sertraline  50 mg Oral Daily     spironolactone  12.5 mg Oral Daily

## 2022-09-15 NOTE — PLAN OF CARE
Problem: Risk for Delirium  Goal: Optimal Coping  Outcome: Met  Goal: Improved Attention and Thought Clarity  Outcome: Met  Goal: Improved Sleep  Outcome: Met   Goal Outcome Evaluation:

## 2022-09-15 NOTE — PROGRESS NOTES
Pt assessment complete. Pt AAO x3 with exceptioms. Pt is fully dependent upon staff for ADLs. Dressing remains c/d/i to BLE. Pt had groin folds and breast folds cleansed and then micatin powder applied. Pt turned q2, up in bed and chair using lift. Pt has had an uneventful night, did have a few outbursts. Resting asleep in bed. No needs at this time. WCTM.

## 2022-09-15 NOTE — PLAN OF CARE
Goal Outcome Evaluation:    Cognitive Concerns/ Orientation: A&Ox2-3, disoriented to time and situation. Forgetful. Anxious -set boundaries at the beginning of the shift-went well, patient communicated more effectively and was not seeking staff to be in her room  BEHAVIOR & AGGRESSION TOOL COLOR: Green  CIWA SCORE: N/A   ABNL VS/O2: VSS, on room air  MOBILITY: Total/lift. Up in the recliner once. Turned in repositioned q 2 hrs  PAIN MANAGMENT: denies pain but complains of tenderness with turning and repositioning  DIET: Regular, appetite poor-encouraging eating meals and drinking fluids  BOWEL/BLADDER: incontinent of bowel and bladder, no stools   ABNL LAB/BG: N/A  DRAIN/DEVICES: None  TELEMETRY RHYTHM: N/A  SKIN: BLE patty, flaky with +2 BLE edema. Legs/foot cares completed-[place] placed back on. Mepilex right lower back-dressing changed, coccyx and left thigh wounds cleaned and treated according to POC  TESTS/PROCEDURES: N/A  D/C DATE: LTC placement  OTHER IMPORTANT INFO: WOC following. Needs encouragement to get up in the recliner for meals-pt insults of eating in bed. Significant other at the bedside.

## 2022-09-16 NOTE — PROGRESS NOTES
[facility]    Medicine Progress Note - Hospitalist Service    Date of Admission:  7/13/2022    Assessment & Plan            Cristy Bailey is a 77 year old female with a PMHx significant for morbid obesity, hypertension, and hyperlipidemia, who presented to [facility] 7/13/2022 with complete left-sided paralysis, left-sided facial droop, and difficulty speaking. CTA head remarkable for right MCA occlusion. She was given tenecteplase and subsequently transferred to [facility] 7/13/2022 for thrombectomy.      Hospital stay complicated by flank wound which required surgical debridement and placement of wound vac and findings LTC placement.     Acute R MCA ischemic stroke with edema and right to left midline shift  S/p tenecteplase and subsequent R MCA mechanical thrombectomy 7/13/22  * Presented to [facility] 7/13 with complete paralysis, left-sided facial droop, and aphasia. Code stroke initiated. Head CT 7/13 showed signs of an evolving R MCA infarct. CTA head 7/13 showed a right carotid terminus occlusion, right middle cerebral artery M1  segment occlusion with poor opacification of the more distal right MCA branches/poor collateral flow. CTA neck 7/13 negative for acute occlusions. Pt given tenecteplase 7/13 at 13:11. Noted to be agitated and was subsequently intubated given need for procedure.   * Subsequently transferred to [facility] 7/13/2022 where she underwent above procedure.   7/14: Extubated. Head CT 7/14 showed evolution of acute infarct involving the R MCA distribution with increased swelling, cortical effacement, and new mild right-to-left midline shift; questionable hypoattenuation involving the bilateral occipital lobes which could be artifactual.   * Additional stroke workup pursued this stay -- echo 7/14 showed EF 50%, grade 1 diastolic dysfunction   * Started ASA and atorvastatin per stroke team.   * Repeat head CT 7/15 showed evolving R MCA stroke with areas of  edema, overall stable.     -- conts on full dose ASA and statin  -- goal SBP <140/90 and met   -- monitored on telemetry for first several wks of hospital stay without abnl rhythm so can likely defer prior recommendations for 30d cardiac event monitor at discharge  -- will need to follow up with [facility] in 6-8 wks (due beginning of September)  Waiting on placement    Anxiety   Cognitive impairment, confusion as above.   * As stay has progressed, patient has consistently been oriented x2 (to self, location), unable to state the year. Also noted to have intermittent situational confusion. States family members have been visiting when they have not.   * Ongoing issues with anxiety this stay -- had been on regimen of Seroquel 12.5mg at dinner and 25mg HS with 12.5mg po q6h prn available. Mirtazapine 7.5mg on 8/15 evening given decreased appetite  * Psych consulted for assistance with ongoing mgmt -- seen on 8/16 and meds adjusted. Seroquel HS changed to Abilify 5mg HS. Recommended Zyptexa 5mg q6h prn for breakthrough insomnia/agitation while going off Seroquel. Consider uptitration of Abilify per psych recs. Advised uptitration of sertraline (25mg x3d then increase to 50mg daily beginning 8/20) and Remeron stopped. Also advised to liberalize Ativan to 0.5mg q6h prn.    * Seen by psych on 8/24, Abilify increased from 5mg to 10mg.  * PRN ativan stopped on 08/27/22 as leading to increased confusion. Hydroxyzine stopped  as well.      -- cont current regimen of meds including Abilify 5mg every evening, Zyprexa 2.5mg HS and sertraline 50mg daily  -- cont prns per psych including Zyprexa 5mg q6h prn  --Giving one-time dose Ativan on 9/17 did for increased anxiety: Patient is crying and calling out frequently through the day.     Urinary retention: Resolved  * Retaining urine on 8/14. UA WNL, not worrisome for infection  * Requiring straight cath x1 on 8/20 PM  * Good UOP, no longer requiring straight cath. No evidence of  obstruction on PVR.      Dysphagia due to CVA: Resolved  * Seen by SLP and noted with dysphagia. Initially required some intermittent fluid boluses.   * Was eventually able to advance to regular diet w/thin liquids     Dyslipidemia  * FLP this stay showed tot cholest 163, HDL 47, LDL 91, .   * Started on atorvastatin 40 mg daily     Volume overload dt diastolic CHF exacerbation: Improved  Essential hypertension  Hypokalemia, recurrent on lasix, and often refusing potassium   * Not on/needing meds PTA.   * As above, echo this stay showed EF 50% with grade I diastolic dysfunction; RV not well visualized but appeared mild-moderately dilated with global systolic function probably mildly reduced.   * BPs were initially soft this stay and required IVFs. BPs improved.   * Developed pedal edema required IV Lasix. Ultimately transition to oral Lasix.  * Started on lisinopril this stay  * BPs improved during stay.   * Lasix decreased from 40mg in AM and 20mg in PM to 20mg BID on 9/3 and added spironolactone 25mg daily    -- reduced lisinopril dose to 5mg daily with parameters on 9/14, will hold lisinopril completely for now as she needs diuretics  -- cont Lasix 20mg po BID  -- reduced spironolactone to 12.5mg daily with parameters on 9/14  -- cont KCl 20mEq po daily  -- f/u bp and adjust closely as indicated     Hypophosphatemia: Resolved     Prolonged QTc   * EKG on 7/13 showed QTc 494.   * Repeat EKG on 7/30 (while on Levaquin) showed QTc table at 475. Has since completed course of abx as below.  * Had been monitoring on telemetry this stay. No concerning findings so tele was ultimately dc'd 8/19     Chronic bilateral LE edema with chronic venous stasis dermatitis and chronic skin changes  Hx of LLE cellulitis  * Was hospitalized in 11/2021 for sepsis dt to pneumonia and LLE cellulitis.   * During this stay, BLE noted to be patty and edematous, no unilateral leg swelling appreciated; LLE had patchy areas of erythema,  no fluctuance, no painful areas with palpation; legs warm to touch. Lymphedema consulted.   -- cont LE elevation, lymphedema wraps     Lower back/R flank wound/abscess, suspect dt pressure type injury, s/p surgical debridement on 7/30/22 and placement of wound vac on 8/2/22,  OA  Hx of bilateral hip replacements   Hx of chronic neck and back pain  * Pt with significant back pain early on in hospital stay. Was requiring IV hydromorphone.  Dose had to be decreased dt somnolence.  * MRI L-spine in 2018 showed multilevel degenerative changes with mild central stenosis. Patient has chronic back pain, reports having it over the last 2 years.   * During this stay, patient was noted with 5-6cm by 2-3cm wound with swelling/fluid/blistering in a fold of her lower back, did not appear infected; this seemed to be the source of her pain. Padded dressing placed and WOC RN ordered. Pain initially improved.   * Was placed on course of Augmentin on 7/24.   * On 7/26, was re-evaluated by WOC RN. Wound appeared more erythematous and purulence expressed. Worsening with shear stress from lift. Procal <0.05, WBC WNL. Abx changed to IV clindamycin.  * Wound cultures obtained. On 7/28, wound grew proteus and staph aureus (MRSA neg). US neg for abscess.   * Lost IV access on 7/28 so abx were changed to oral clindamycin and oral levaquin. IV access re-established but was continued on oral abx.  * General surgery consulted, ultimately underwent excisional debridement of R flank abscess in OR on 7/30. Intraop cultures showed polymicrobial growth with proteus mirabilis x2 strains (both pan-sensitive), corynebacterium striatum and enterococcus faecalis.  * Wound vac placed per general surgery on 8/2 >> subsequently removed during stay.  * ID consulted on 8/2 and abx narrowed to Augmentin alone, completed an additional 5 days of treatment on 8/7.      -- wound per WOC RN, follows weekly  -- prns available for pain  -- cont regular  repositioning     Pressure Injury, left Medial thigh/groin  Pressure Injury Stage: Deep Tissue Pressure Injury (DTPI), hospital acquired, now HAPI stage 2 pressure injury device related purwick external catheter tubing (not the purwick device itself)  - Wound care following    Pressure injury bilateral buttocks  * Noted by nursing on 09/02/22. WOC RN following as above.      Suspected meningioma left parietal region, incidentally seen on admission CT on 7/13  * Head CT 7/13 showed a calcified extra-axial mass overlying the left parietal region measuring 1.4 cm, potentially representing a meningioma.  * Will need serial monitoring OP after discharge.      Anemia, suspect dilutional component: Resolved  * Hgb normal on admit. Hgb 11.5 on 7/16. No overt clinical signs of major bleeding.  * Hgb now stable at 12-13      ntertriginous dermatitis  * Chronic and stable, cont clotrimazole powder     Constipation  * Continue sched bowel regimen     Morbid obesity  * BMI 59 on admission. Recommend aggressive dietary and lifestyle modifications as condition improves     Suspected sleep apnea  * O2 drop to mid 80s overnight 8/14. Briefly placed on 4LPM, which she then removed and then slept okay with sats in 90s thereafter.  * Recommend sleep study as outpatient     Moderate malnutrition in context of acute illness and chronic disease  * Nutritionist following. Appetite poor this stay, needing encouragement to take po.      COVID-19 vaccination status  * Received first dose of vaccine on 8/28, 2nd dose due 9/18/22       Severe malnutrition. In Context of:  Acute illness or injury  Chronic illness or disease  Malnutrition: (8/31)   % Weight Loss:  > 5% in 1 month (severe malnutrition) - significant loss over this admit  % Intake:  </= 50% for >/= 5 days (severe malnutrition) - consistent this admit x49 days  Subcutaneous Fat Loss:  Orbital region moderate depletion - visual, per 8/16 note  Muscle Loss:  Temporal  region moderate depletion - visual, per 8/16 note  Fluid Retention:  Mild generalized edema  - dietitian following          Diet: Advance Diet as Tolerated  Regular Diet Adult Thin Liquids (level 0) (Upright position, alert, and assist as needed)  Room Service    DVT Prophylaxis: Enoxaparin (Lovenox) SQ  [place] Catheter: Not present  Central Lines: None  Cardiac Monitoring: None  Code Status: Full Code      Disposition Plan      Expected Discharge Date: 09/20/2022    Discharge Delays: Placement - LTC  Covid Vaccine for Placement  *Medically Ready for Discharge    Discharge Comments: . Writer resent referrals to [facility], [facility], [facility], [facility] and [facility]. [facility] . Will get 2nd dose of covid vaccine later this month.        The patient's care was discussed with the Patient.    Jo White MD  Hospitalist Service  [facility]  Securely message with the Vocera Web Console (learn more here)  Text page via [employer] Paging/Directory         Clinically Significant Risk Factors Present on Admission                      ______________________________________________________________________    Interval History   Stable overnight.  Patient asks when she can leave the hospital.  She thinks she is going home.  When I told her that we are looking at TCU she became upset and insisted on going home.  Her friend Leo was at the bedside.  She states that he can take care of her and that they have a chairlift at home.  Myself and bedside RN explained to her that she is currently lift dependent and unable to even bring herself up to a stand on her legs.  Leo agreed that he would not be able to provide the level of assistance that she needs at home and she would need to go to a facility.    Data reviewed today: I reviewed all medications, new labs and imaging results over the last 24 hours. I personally  reviewed no images or EKG's today.    Physical Exam   Vital Signs: Temp: 97.5  F (36.4  C) Temp src: Oral BP: 111/53 Pulse: 61   Resp: 18 SpO2: 95 % O2 Device: None (Room air)    Weight: 301 lbs 11.2 oz  General Appearance: Obese , no distress, alert and conversant  Respiratory: Lungs clear  Cardiovascular: RRR  GI: Nontender, nondistended  Skin: No rash  Extremities: No edema, moving all extremities      Data   Recent Labs   Lab 09/16/22  0809 09/15/22  1050 09/14/22  1759 09/12/22  1102 09/12/22  0719 09/10/22  0837 09/09/22  1727   PLT  --  282  --  314  --   --  294   NA  --   --   --   --  140  --   --    POTASSIUM 3.4  --  3.5  --  3.5  --   --    CHLORIDE  --   --   --   --  104  --   --    CO2  --   --   --   --  30  --   --    BUN  --   --   --   --  27  --   --    CR  --   --   --   --  0.70 0.74  --    ANIONGAP  --   --   --   --  6  --   --    ASHIA  --   --   --   --  8.7  --   --    GLC  --   --   --   --  99  --   --      No results found for this or any previous visit (from the past 24 hour(s)).  Medications     - MEDICATION INSTRUCTIONS -         ARIPiprazole  10 mg Oral QPM     aspirin  325 mg Oral Daily     atorvastatin  40 mg Oral QPM     COVID-19 mRNA vacc (PFIZER)  30 mcg Intramuscular Q21 Days     enoxaparin ANTICOAGULANT  40 mg Subcutaneous Q12H     furosemide  20 mg Oral BID     [Held by provider] lisinopril  5 mg Oral Daily     miconazole   Topical BID     multivitamin w/minerals  1 tablet Oral Daily     OLANZapine  2.5 mg Oral At Bedtime     polyethylene glycol  17 g Oral Daily     potassium chloride  20 mEq Oral Daily     senna-docusate  1-2 tablet Oral BID     sertraline  50 mg Oral Daily     spironolactone  12.5 mg Oral Daily

## 2022-09-16 NOTE — PLAN OF CARE
Goal Outcome Evaluation:    Cognitive Concerns/ Orientation: A&Ox2, disoriented to time and situation, anxious at times. Boundaries established.  BEHAVIOR & AGGRESSION TOOL COLOR: Green     ABNL VS/O2: VSS, afebrile  MOBILITY: Total/lift. Turned and repositioned assist x 2  PAIN MANAGMENT: denies  DIET: Regular  BOWEL/BLADDER: incontinent, use commode only  ABNL LAB/BG: NA  DRAIN/DEVICES: None  TELEMETRY RHYTHM: NA  SKIN: +2 edema to BLE. BLE patty, flaky-legs cleansed with Azra and edemawear reapplied. Dressing to right lower back-changed, left thigh and coccyx have triad paste applied. Right buttocks and left thigh-red blanchable area from a prior bedpan use  TESTS/PROCEDURES: NA  D/C DATE: Pending placement  Discharge Barriers: LTC placement, will get covid vaccine on Monday  OTHER IMPORTANT INFO: WOC following  Up in the chair this am with breakfast-needs encouragement to eat and drink.

## 2022-09-16 NOTE — PLAN OF CARE
DATE & TIME: 9/15/22, 2300 - 1265   Cognitive Concerns/ Orientation : A&O x 2, disoriented to time and situation   BEHAVIOR & AGGRESSION TOOL COLOR: Green   ABNL VS/O2: Vital signs deferred, done 2 times daily  MOBILITY: Total/lift. Turned and repositioned assist x 2  PAIN MANAGMENT: Prn Oxycodone given along with application of neck pain. Helpful  DIET: Regular  BOWEL/BLADDER: Voided in bedpan overnight. No BM   ABNL LAB/BG: NA  DRAIN/DEVICES: None  TELEMETRY RHYTHM: NA  SKIN: +2 edema to BLE. BLE patty, flaky. edemawear on. Dressing to right lower back, and left inner thigh, CDI  TESTS/PROCEDURES: NA  D/C DATE: Pending placement  Discharge Barriers: LTC placement  OTHER IMPORTANT INFO: WOC following  Goal Outcome Evaluation:    Plan of Care Reviewed With: patient     Overall Patient Progress: no change

## 2022-09-16 NOTE — PLAN OF CARE
Goal Outcome Evaluation:    Summary: CVA R MCA  DATE & TIME: 9/15 [id number]  Cognitive Concerns/ Orientation: A&Ox2-3, disoriented to time and situation. Forgetful. Pt was slightly anxious but easily redirectable   BEHAVIOR & AGGRESSION TOOL COLOR: Green  CIWA SCORE: N/A   ABNL VS/O2: VSS, on room air  MOBILITY: Total/lift. Up in the recliner once but quickly asked to return to bed - Turned in repositioned q 2 hrs  PAIN MANAGMENT: denies pain but C/O tenderness with turning and repositioning  DIET: Regular, appetite poor-encouraged to eat and drink  BOWEL/BLADDER: incontinent of bowel and bladder, no stools   ABNL LAB/BG: N/A  DRAIN/DEVICES: None  TELEMETRY RHYTHM: N/A  SKIN: BLE patty, flaky with +2 BLE edema. edemawear on. Mepilex right lower back-dressing CDI, coccyx and left thigh wound dressings PPC WDL  CDI   TESTS/PROCEDURES: N/A  D/C DATE: LTC placement  OTHER IMPORTANT INFO: WOC following. Needs frequent encouragement to get up in the recliner for meals

## 2022-09-16 NOTE — PROGRESS NOTES
"[facility] Nurse Inpatient Assessment     Consulted for: Right lower back wound (Stage 3 HAPI) and Left medial thigh wound (DTPI HAPI)    Areas Assessed:      Areas visualized during today's visit: right skin fold, left medial thigh      Wound location: Right lower back in waist crease    9/13 9/16        Photo date: 9/16  Wound due to: Hospital Acquired Pressure injury stage 3   Stage: Unstageable 7/28, Following surgical debridement 7/30 Stage 3 Pressure Injury and Moisture Associated Skin Damage (MASD), suspect intertriginous pressure, appears to be deeper tissue damage within a crease, possible shear component from lift.    Wound history/plan of care:  Hypergranulation persists with small area of slough at lower edge. Will switch to hydrofera blue trial.   Wound base: 60% granulation and 35 % hypergranulation and <5% yellow adherent slough     Palpation of the wound bed: normal       Drainage: moderate      Description of drainage: serosanguinous      Measurements (length x width x depth, in cm) 2 x 14.5 x 0.3cm      Tunneling N/A      Undermining NA  Periwound skin: necrotic tissue lifting, only one small (less than 0.5cm x 1cm) at Left lower edge of wound      Color: pink, small pustule approx 2\" above wound-entire area cleansed well.       Temperature: normal to warm and sweaty  Odor: none    Pain: none   Pain intervention prior to dressing change: none needed   Treatment goal: Heal   STATUS: improving   Supplies ordered: at bedside     Pressure Injury Location: Left Medial thigh/groin  Last photo: 9/16      Wound type: Pressure Injury     Pressure Injury Stage: Deep Tissue Pressure Injury (DTPI), hospital acquired, now HAPI stage 2 pressure injury device related purwick external catheter tubing (not the purwick device itself)   Wound history/plan of care:   Pt has had long (day 62 today 9-13-22) hospital course and unable to get up to the bathroom. Pt complaint of heavy " "legs and difficulty with moving independently.     Wound base: 30 % pink dermis 80% tan slough     Palpation of the wound bed: normal      Drainage: none     Description of drainage: none     Measurements (length x width x depth, in cm) now 2 separate area medial 0.4 x 2 x 0.1 and distal 0.5 x 2.5 x 0.1cm     Tunneling N/A     Undermining N/A  Periwound skin: Intact      Color: normal and consistent with surrounding tissue      Temperature: normal   Odor: none  Pain: moderate, tender  Pain intervention prior to dressing change: N/A  Treatment goal: Heal   STATUS: healing  Supplies ordered: at bedside and supplies stored on unit     Wound location: Left breast    Last photo: 9/16/22        Wound due to: Moisture Associated Skin Damage (MASD)  Wound history/plan of care: moisture split, deep crease with moisture trapping  Wound base: 100 % dermis, pink and moist     Palpation of the wound bed: normal      Drainage: none     Description of drainage: none     Measurements (length x width x depth, in cm): 0.3  x 4.5  x  0.1 cm      Tunneling: N/A     Undermining: N/A  Periwound skin: Intact and Erythema- blanchable      Color: pink      Temperature: normal   Odor: none  Pain: denies , none  Pain interventions prior to dressing change: N/A  Treatment goal: Heal  and Decrease moisture  STATUS: initial assessment  Supplies ordered: at bedside      Treatment Plan:     Posterior waist crease: Every other day Even Days  1. Remove dressings and discard. Wet Aquacel AG to ease removal if dry still.   2. Cleanse wound and periwound skin with Vashe ( #[id number]) solution and 4x4\" gauze, pat dry   3. Apply 3M No Sting barrier wipe to periwound skin, let dry   4. Cut piece of hydrofera blue (#[id number]) and moisten with PLAIN SALINE, wring out excess moisture and apply to wound bed  5. Cover with Mepilex  6. Ok to apply thin layer of triad paste to any reddened or denuded tissue to periwound.       Left Inner thigh/groin/Left breast: " "Every other day and PRN  1. Clean wound with saline or MicroKlenz Spray, pat dry  2. Wipe / \"clean\" the surrounding periwound tissue with skin prep (Cavilon No Sting Skin Prep #[id number]) and allow to dry. This will help protect periwound and help dressing adherence  3. Apply Triad paste to wound bed until tissue closes, then stop Triad and switch to InterDry AG. Don't use Triad and Interdry together as the paste can prevent InterDry from working.   to the area, making sure to conform nicely to skin curvatures.   4. Time and date dressing change  NOTE  -Reposition pt side to side ever when in bed, every 2 hours-get the pt way over on side to completely offload pressure. This will benefit skin and respiratory function   -Keep heels elevated and floating on pillows at all times. Try using at least 2 pillows under each calf.  -When up to the chair pt needs to fully offload every 2 hours and use a chair cushion if needed       Orders: Updated    RECOMMEND PRIMARY TEAM ORDER: None, at this time  Education provided: importance of repositioning, plan of care, wound progress, Moisture management and Off-loading pressure  Discussed plan of care with: Patient and Nurse  WOC nurse follow-up plan: weekly  Notify WOC if wound(s) deteriorate.  Nursing to notify the Provider(s) and re-consult the WOC Nurse if new skin concern.    DATA:     Current support surface: Bariatric Low air loss mattress    Containment of urine/stool: Incontinence Protocol and Incontinent pad in bed  BMI: Body mass index is 48.7 kg/m .   Active diet order: Orders Placed This Encounter      Advance Diet as Tolerated      Regular Diet Adult Thin Liquids (level 0) (Upright position, alert, and assist as needed)     Output: I/O last 3 completed shifts:  In: 60 [P.O.:60]  Out: -      Labs:   No lab results found in last 7 days.    Invalid input(s): GLUCOMBO  Pressure injury risk assessment:   Sensory Perception: 3-->slightly limited  Moisture: 3-->occasionally " moist  Activity: 2-->chairfast  Mobility: 3-->slightly limited  Nutrition: 2-->probably inadequate  Friction and Shear: 2-->potential problem  Demetrio Score: 15    Telly Meyer RN CWN   Dept. Pager: [phone]  Dept. Office Number: [phone]

## 2022-09-17 NOTE — PROGRESS NOTES
[facility]     Medicine Progress Note - Hospitalist Service     Date of Admission:  7/13/2022        Assessment & Plan             Cristy Bailey is a 77 year old female with a PMHx significant for morbid obesity, hypertension, and hyperlipidemia, who presented to [facility] 7/13/2022 with complete left-sided paralysis, left-sided facial droop, and difficulty speaking. CTA head remarkable for right MCA occlusion. She was given tenecteplase and subsequently transferred to [facility] 7/13/2022 for thrombectomy.      Hospital stay complicated by flank wound which required surgical debridement and placement of wound vac and findings LTC placement.     Acute R MCA ischemic stroke with edema and right to left midline shift  S/p tenecteplase and subsequent R MCA mechanical thrombectomy 7/13/22  * Presented to [facility] 7/13 with complete paralysis, left-sided facial droop, and aphasia. Code stroke initiated. Head CT 7/13 showed signs of an evolving R MCA infarct. CTA head 7/13 showed a right carotid terminus occlusion, right middle cerebral artery M1  segment occlusion with poor opacification of the more distal right MCA branches/poor collateral flow. CTA neck 7/13 negative for acute occlusions. Pt given tenecteplase 7/13 at 13:11. Noted to be agitated and was subsequently intubated given need for procedure.   * Subsequently transferred to [facility] 7/13/2022 where she underwent above procedure.   7/14: Extubated. Head CT 7/14 showed evolution of acute infarct involving the R MCA distribution with increased swelling, cortical effacement, and new mild right-to-left midline shift; questionable hypoattenuation involving the bilateral occipital lobes which could be artifactual.   * Additional stroke workup pursued this stay -- echo 7/14 showed EF 50%, grade 1 diastolic dysfunction   * Started ASA and atorvastatin per stroke team.   * Repeat head CT 7/15 showed evolving R MCA stroke with  areas of edema, overall stable.     -- conts on full dose ASA and statin  -- goal SBP <140/90 and met   -- monitored on telemetry for first several wks of hospital stay without abnl rhythm so can likely defer prior recommendations for 30d cardiac event monitor at discharge  -- will need to follow up with [facility] in 6-8 wks (due beginning of September)  Waiting on placement     Anxiety   Cognitive impairment, confusion as above.   * As stay has progressed, patient has consistently been oriented x2 (to self, location), unable to state the year. Also noted to have intermittent situational confusion. States family members have been visiting when they have not.   * Ongoing issues with anxiety this stay -- had been on regimen of Seroquel 12.5mg at dinner and 25mg HS with 12.5mg po q6h prn available. Mirtazapine 7.5mg on 8/15 evening given decreased appetite  * Psych consulted for assistance with ongoing mgmt -- seen on 8/16 and meds adjusted. Seroquel HS changed to Abilify 5mg HS. Recommended Zyptexa 5mg q6h prn for breakthrough insomnia/agitation while going off Seroquel. Consider uptitation of Abilify per psych recs. Advised uptitration of sertraline (25mg x3d then increase to 50mg daily beginning 8/20) and Remeron stopped. Also advised to liberalize Ativan to 0.5mg q6h prn.    * Seen by psych on 8/24, Abilify increased from 5mg to 10mg.  * PRN ativan stopped on 08/27/22 as leading to increased confusion. Hydroxyzine stopped  as well.      -- cont current regimen of meds including Abilify 5mg every evening, Zyprexa 2.5mg HS and sertraline 50mg daily  -- cont prns per psych including Zyprexa 5mg q6h prn     Urinary retention: Resolved  * Retaining urine on 8/14. UA WNL, not worrisome for infection  * Requiring straight cath x1 on 8/20 PM  * Good UOP, no longer requiring straight cath. No evidence of obstruction on PVR.      Dysphagia due to CVA: Resolved  * Seen by SLP and noted with dysphagia. Initially required some  intermittent fluid boluses.   * Was eventually able to advance to regular diet w/thin liquids     Dyslipidemia  * FLP this stay showed tot cholest 163, HDL 47, LDL 91, .   * Started on atorvastatin 40 mg daily     Volume overload dt diastolic CHF exacerbation: Improved  Essential hypertension  Hypokalemia, recurrent on lasix, and often refusing potassium   * Not on/needing meds PTA.   * As above, echo this stay showed EF 50% with grade I diastolic dysfunction; RV not well visualized but appeared mild-moderately dilated with global systolic function probably mildly reduced.   * BPs were initially soft this stay and required IVFs. BPs improved.   * Developed pedal edema required IV Lasix. Ultimately transition to oral Lasix.  * Started on lisinopril this stay  * BPs improved during stay.   * Lasix decreased from 40mg in AM and 20mg in PM to 20mg BID on 9/3 and added spironolactone 25mg daily     -- soft bp this am and lisinopril held this am  -- reduced lisinopril dose to 5mg daily with parameters on 9/14, will hold lisinopril completely for now as she needs diuretics  -- cont Lasix 20mg po BID  -- reduced spironolactone to 12.5mg daily with parameters on 9/14  -- cont KCl 20mEq po daily  -- f/u bp and adjust closely as indicated     Hypophosphatemia: Resolved     Prolonged QTc   * EKG on 7/13 showed QTc 494.   * Repeat EKG on 7/30 (while on Levaquin) showed QTc table at 475. Has since completed course of abx as below.  * Had been monitoring on telemetry this stay. No concerning findings so tele was ultimately dc'd 8/19     Chronic bilateral LE edema with chronic venous stasis dermatitis and chronic skin changes  Hx of LLE cellulitis  * Was hospitalized in 11/2021 for sepsis dt to pneumonia and LLE cellulitis.   * During this stay, BLE noted to be patty and edematous, no unilateral leg swelling appreciated; LLE had patchy areas of erythema, no fluctuance, no painful areas with palpation; legs warm to touch.  Lymphedema consulted.   -- cont LE elevation, lymphedema wraps     Lower back/R flank wound/abscess, suspect dt pressure type injury, s/p surgical debridement on 7/30/22 and placement of wound vac on 8/2/22,  OA  Hx of bilateral hip replacements   Hx of chronic neck and back pain  * Pt with significant back pain early on in hospital stay. Was requiring IV hydromorphone.  Dose had to be decreased dt somnolence.  * MRI L-spine in 2018 showed multilevel degenerative changes with mild central stenosis. Patient has chronic back pain, reports having it over the last 2 years.   * During this stay, patient was noted with 5-6cm by 2-3cm wound with swelling/fluid/blistering in a fold of her lower back, did not appear infected; this seemed to be the source of her pain. Padded dressing placed and WOC RN ordered. Pain initially improved.   * Was placed on course of Augmentin on 7/24.   * On 7/26, was re-evaluated by WOC RN. Wound appeared more erythematous and purulence expressed. Worsening with shear stress from lift. Procal <0.05, WBC WNL. Abx changed to IV clindamycin.  * Wound cultures obtained. On 7/28, wound grew proteus and staph aureus (MRSA neg). US neg for abscess.   * Lost IV access on 7/28 so abx were changed to oral clindamycin and oral levaquin. IV access re-established but was continued on oral abx.  * General surgery consulted, ultimately underwent excisional debridement of R flank abscess in OR on 7/30. Intraop cultures showed polymicrobial growth with proteus mirabilis x2 strains (both pan-sensitive), corynebacterium striatum and enterococcus faecalis.  * Wound vac placed per general surgery on 8/2 >> subsequently removed during stay.  * ID consulted on 8/2 and abx narrowed to Augmentin alone, completed an additional 5 days of treatment on 8/7.      -- wound per WOC RN, follows weekly  -- prns available for pain  -- cont regular repositioning     Pressure Injury, left Medial thigh/groin  Pressure Injury Stage:  Deep Tissue Pressure Injury (DTPI), hospital acquired, now HAPI stage 2 pressure injury device related purwick external catheter tubing (not the purwick device itself)  - Wound care following     Pressure injury bilateral buttocks  * Noted by nursing on 09/02/22. WOC RN following as above.      Suspected meningioma left parietal region, incidentally seen on admission CT on 7/13  * Head CT 7/13 showed a calcified extra-axial mass overlying the left parietal region measuring 1.4 cm, potentially representing a meningioma.  * Will need serial monitoring OP after discharge.      Anemia, suspect dilutional component: Resolved  * Hgb normal on admit. Hgb 11.5 on 7/16. No overt clinical signs of major bleeding.  * Hgb now stable at 12-13      ntertriginous dermatitis  * Chronic and stable, cont clotrimazole powder     Constipation  * Continue sched bowel regimen     Morbid obesity  * BMI 59 on admission. Recommend aggressive dietary and lifestyle modifications as condition improves     Suspected sleep apnea  * O2 drop to mid 80s overnight 8/14. Briefly placed on 4LPM, which she then removed and then slept okay with sats in 90s thereafter.  * Recommend sleep study as outpatient     Moderate malnutrition in context of acute illness and chronic disease  * Nutritionist following. Appetite poor this stay, needing encouragement to take po.      COVID-19 vaccination status  * Received first dose of vaccine on 8/28, 2nd dose due 9/18/22        Severe malnutrition. In Context of:  Acute illness or injury  Chronic illness or disease  Malnutrition: (8/31)   % Weight Loss:  > 5% in 1 month (severe malnutrition) - significant loss over this admit  % Intake:  </= 50% for >/= 5 days (severe malnutrition) - consistent this admit x49 days  Subcutaneous Fat Loss:  Orbital region moderate depletion - visual, per 8/16 note  Muscle Loss:  Temporal region moderate depletion - visual, per 8/16 note  Fluid Retention:  Mild generalized edema  -  dietitian following              Diet: Advance Diet as Tolerated  Regular Diet Adult Thin Liquids (level 0) (Upright position, alert, and assist as needed)  Room Service    DVT Prophylaxis: Enoxaparin (Lovenox) SQ  [place] Catheter: Not present  Central Lines: None  Cardiac Monitoring: None  Code Status: Full Code          Disposition Plan        Expected Discharge Date: 09/20/2022    Discharge Delays: Placement - LTC  Covid Vaccine for Placement  *Medically Ready for Discharge    Discharge Comments: . Writer resent referrals to [facility], [facility], [facility], [facility] and [facility]. Writer also sent a referral to matheus Hua and . Will get 2nd dose of covid vaccine later this month.           The patient's care was discussed with the Patient.     Jo White MD  Hospitalist Service  [facility]  Securely message with the Vocera Web Console (learn more here)  Text page via [employer] Paging/Directory               Clinically Significant Risk Factors Present on Admission               ______________________________________________________________________        Interval History     Stable overnight.  Denies any concerns.  No concerns per RN.  Awaiting placement.     Data reviewed today: I reviewed all medications, new labs and imaging results over the last 24 hours. I personally reviewed no images or EKG's today.           Physical Exam     Vital Signs: Temp: 97.5  F (36.4  C) Temp src: Oral BP: 111/53 Pulse: 61   Resp: 18 SpO2: 95 % O2 Device: None (Room air)    Weight: 301 lbs 11.2 oz  General Appearance:  Obese , no distress, alert and conversant  Respiratory: Lungs clear  Cardiovascular: RRR  GI: Nontender, nondistended  Skin: No rash  Extremities: No edema, moving all extremities              Data                Recent Labs   Lab 09/16/22  0809 09/15/22  1050 09/14/22  1759 09/12/22  1102 09/12/22  0719 09/10/22  0837 09/09/22  1727   PLT  --   282  --  314  --   --  294   NA  --   --   --   --  140  --   --    POTASSIUM 3.4  --  3.5  --  3.5  --   --    CHLORIDE  --   --   --   --  104  --   --    CO2  --   --   --   --  30  --   --    BUN  --   --   --   --  27  --   --    CR  --   --   --   --  0.70 0.74  --    ANIONGAP  --   --   --   --  6  --   --    ASHIA  --   --   --   --  8.7  --   --    GLC  --   --   --   --  99  --   --       No results found for this or any previous visit (from the past 24 hour(s)).        Medications

## 2022-09-17 NOTE — PROGRESS NOTES
[facility]    Medicine Progress Note - Hospitalist Service    Date of Admission:  7/13/2022    Assessment & Plan            Cristy Bailey is a 77 year old female with a PMHx significant for morbid obesity, hypertension, and hyperlipidemia, who presented to [facility] 7/13/2022 with complete left-sided paralysis, left-sided facial droop, and difficulty speaking. CTA head remarkable for right MCA occlusion. She was given tenecteplase and subsequently transferred to [facility] 7/13/2022 for thrombectomy.      Hospital stay complicated by flank wound which required surgical debridement and placement of wound vac and findings LTC placement.     Acute R MCA ischemic stroke with edema and right to left midline shift  S/p tenecteplase and subsequent R MCA mechanical thrombectomy 7/13/22  * Presented to [facility] 7/13 with complete paralysis, left-sided facial droop, and aphasia. Code stroke initiated. Head CT 7/13 showed signs of an evolving R MCA infarct. CTA head 7/13 showed a right carotid terminus occlusion, right middle cerebral artery M1  segment occlusion with poor opacification of the more distal right MCA branches/poor collateral flow. CTA neck 7/13 negative for acute occlusions. Pt given tenecteplase 7/13 at 13:11. Noted to be agitated and was subsequently intubated given need for procedure.   * Subsequently transferred to [facility] 7/13/2022 where she underwent above procedure.   7/14: Extubated. Head CT 7/14 showed evolution of acute infarct involving the R MCA distribution with increased swelling, cortical effacement, and new mild right-to-left midline shift; questionable hypoattenuation involving the bilateral occipital lobes which could be artifactual.   * Additional stroke workup pursued this stay -- echo 7/14 showed EF 50%, grade 1 diastolic dysfunction   * Started ASA and atorvastatin per stroke team.   * Repeat head CT 7/15 showed evolving R MCA stroke with areas of  edema, overall stable.     -- conts on full dose ASA and statin  -- goal SBP <140/90 and met   -- monitored on telemetry for first several wks of hospital stay without abnl rhythm so can likely defer prior recommendations for 30d cardiac event monitor at discharge  -- will need to follow up with [facility] in 6-8 wks (due beginning of September)  Waiting on placement    Anxiety   Cognitive impairment, confusion as above.   * As stay has progressed, patient has consistently been oriented x2 (to self, location), unable to state the year. Also noted to have intermittent situational confusion. States family members have been visiting when they have not.   * Ongoing issues with anxiety this stay -- had been on regimen of Seroquel 12.5mg at dinner and 25mg HS with 12.5mg po q6h prn available. Mirtazapine 7.5mg on 8/15 evening given decreased appetite  * Psych consulted for assistance with ongoing mgmt -- seen on 8/16 and meds adjusted. Seroquel HS changed to Abilify 5mg HS. Recommended Zyptexa 5mg q6h prn for breakthrough insomnia/agitation while going off Seroquel. Consider uptitration of Abilify per psych recs. Advised uptitration of sertraline (25mg x3d then increase to 50mg daily beginning 8/20) and Remeron stopped. Also advised to liberalize Ativan to 0.5mg q6h prn.    * Seen by psych on 8/24, Abilify increased from 5mg to 10mg.  * PRN ativan stopped on 08/27/22 as leading to increased confusion. Hydroxyzine stopped  as well.      -- cont current regimen of meds including Abilify 5mg every evening, Zyprexa 2.5mg HS and sertraline 50mg daily  -- cont prns per psych including Zyprexa 5mg q6h prn  --Giving one-time dose Ativan on 9/17 did for increased anxiety: Patient is crying and calling out frequently through the day.     Urinary retention: Resolved  * Retaining urine on 8/14. UA WNL, not worrisome for infection  * Requiring straight cath x1 on 8/20 PM  * Good UOP, no longer requiring straight cath. No evidence of  obstruction on PVR.      Dysphagia due to CVA: Resolved  * Seen by SLP and noted with dysphagia. Initially required some intermittent fluid boluses.   * Was eventually able to advance to regular diet w/thin liquids     Dyslipidemia  * FLP this stay showed tot cholest 163, HDL 47, LDL 91, .   * Started on atorvastatin 40 mg daily     Volume overload dt diastolic CHF exacerbation: Improved  Essential hypertension  Hypokalemia, recurrent on lasix, and often refusing potassium   * Not on/needing meds PTA.   * As above, echo this stay showed EF 50% with grade I diastolic dysfunction; RV not well visualized but appeared mild-moderately dilated with global systolic function probably mildly reduced.   * BPs were initially soft this stay and required IVFs. BPs improved.   * Developed pedal edema required IV Lasix. Ultimately transition to oral Lasix.  * Started on lisinopril this stay  * BPs improved during stay.   * Lasix decreased from 40mg in AM and 20mg in PM to 20mg BID on 9/3 and added spironolactone 25mg daily    -- reduced lisinopril dose to 5mg daily with parameters on 9/14, will hold lisinopril completely for now as she needs diuretics  -- cont Lasix 20mg po BID  -- reduced spironolactone to 12.5mg daily with parameters on 9/14  -- cont KCl 20mEq po daily  -- f/u bp and adjust closely as indicated     Hypophosphatemia: Resolved     Prolonged QTc   * EKG on 7/13 showed QTc 494.   * Repeat EKG on 7/30 (while on Levaquin) showed QTc table at 475. Has since completed course of abx as below.  * Had been monitoring on telemetry this stay. No concerning findings so tele was ultimately dc'd 8/19     Chronic bilateral LE edema with chronic venous stasis dermatitis and chronic skin changes  Hx of LLE cellulitis  * Was hospitalized in 11/2021 for sepsis dt to pneumonia and LLE cellulitis.   * During this stay, BLE noted to be patty and edematous, no unilateral leg swelling appreciated; LLE had patchy areas of erythema,  no fluctuance, no painful areas with palpation; legs warm to touch. Lymphedema consulted.   -- cont LE elevation, lymphedema wraps     Lower back/R flank wound/abscess, suspect dt pressure type injury, s/p surgical debridement on 7/30/22 and placement of wound vac on 8/2/22,  OA  Hx of bilateral hip replacements   Hx of chronic neck and back pain  * Pt with significant back pain early on in hospital stay. Was requiring IV hydromorphone.  Dose had to be decreased dt somnolence.  * MRI L-spine in 2018 showed multilevel degenerative changes with mild central stenosis. Patient has chronic back pain, reports having it over the last 2 years.   * During this stay, patient was noted with 5-6cm by 2-3cm wound with swelling/fluid/blistering in a fold of her lower back, did not appear infected; this seemed to be the source of her pain. Padded dressing placed and WOC RN ordered. Pain initially improved.   * Was placed on course of Augmentin on 7/24.   * On 7/26, was re-evaluated by WOC RN. Wound appeared more erythematous and purulence expressed. Worsening with shear stress from lift. Procal <0.05, WBC WNL. Abx changed to IV clindamycin.  * Wound cultures obtained. On 7/28, wound grew proteus and staph aureus (MRSA neg). US neg for abscess.   * Lost IV access on 7/28 so abx were changed to oral clindamycin and oral levaquin. IV access re-established but was continued on oral abx.  * General surgery consulted, ultimately underwent excisional debridement of R flank abscess in OR on 7/30. Intraop cultures showed polymicrobial growth with proteus mirabilis x2 strains (both pan-sensitive), corynebacterium striatum and enterococcus faecalis.  * Wound vac placed per general surgery on 8/2 >> subsequently removed during stay.  * ID consulted on 8/2 and abx narrowed to Augmentin alone, completed an additional 5 days of treatment on 8/7.      -- wound per WOC RN, follows weekly  -- prns available for pain  -- cont regular  repositioning     Pressure Injury, left Medial thigh/groin  Pressure Injury Stage: Deep Tissue Pressure Injury (DTPI), hospital acquired, now HAPI stage 2 pressure injury device related purwick external catheter tubing (not the purwick device itself)  - Wound care following    Pressure injury bilateral buttocks  * Noted by nursing on 09/02/22. WOC RN following as above.      Suspected meningioma left parietal region, incidentally seen on admission CT on 7/13  * Head CT 7/13 showed a calcified extra-axial mass overlying the left parietal region measuring 1.4 cm, potentially representing a meningioma.  * Will need serial monitoring OP after discharge.      Anemia, suspect dilutional component: Resolved  * Hgb normal on admit. Hgb 11.5 on 7/16. No overt clinical signs of major bleeding.  * Hgb now stable at 12-13      ntertriginous dermatitis  * Chronic and stable, cont clotrimazole powder     Constipation  * Continue sched bowel regimen     Morbid obesity  * BMI 59 on admission. Recommend aggressive dietary and lifestyle modifications as condition improves     Suspected sleep apnea  * O2 drop to mid 80s overnight 8/14. Briefly placed on 4LPM, which she then removed and then slept okay with sats in 90s thereafter.  * Recommend sleep study as outpatient     Moderate malnutrition in context of acute illness and chronic disease  * Nutritionist following. Appetite poor this stay, needing encouragement to take po.      COVID-19 vaccination status  * Received first dose of vaccine on 8/28, 2nd dose due 9/18/22       Severe malnutrition. In Context of:  Acute illness or injury  Chronic illness or disease  Malnutrition: (8/31)   % Weight Loss:  > 5% in 1 month (severe malnutrition) - significant loss over this admit  % Intake:  </= 50% for >/= 5 days (severe malnutrition) - consistent this admit x49 days  Subcutaneous Fat Loss:  Orbital region moderate depletion - visual, per 8/16 note  Muscle Loss:  Temporal  region moderate depletion - visual, per 8/16 note  Fluid Retention:  Mild generalized edema  - dietitian following          Diet: Advance Diet as Tolerated  Regular Diet Adult Thin Liquids (level 0) (Upright position, alert, and assist as needed)  Room Service    DVT Prophylaxis: Enoxaparin (Lovenox) SQ  [place] Catheter: Not present  Central Lines: None  Cardiac Monitoring: None  Code Status: Full Code      Disposition Plan      Expected Discharge Date: 09/21/2022    Discharge Delays: Placement - LTC  Covid Vaccine for Placement  *Medically Ready for Discharge    Discharge Comments: . Writer resent referrals to [facility], [facility], [facility], [facility] and [facility]. [facility] and . Will get 2nd dose of covid vaccine later this month.        The patient's care was discussed with the Patient.    Jo White MD  Hospitalist Service  [facility]  Securely message with the Vocera Web Console (learn more here)  Text page via [employer] Paging/Directory         Clinically Significant Risk Factors Present on Admission                      ______________________________________________________________________    Interval History   Stable overnight.  Patient asks when she can leave the hospital.  She thinks she is going home.  When I told her that we are looking at TCU she became upset and insisted on going home.  Her friend Leo was at the bedside.  She states that he can take care of her and that they have a chairlift at home.  Myself and bedside RN explained to her that she is currently lift dependent and unable to even bring herself up to a stand on her legs.  Leo agreed that he would not be able to provide the level of assistance that she needs at home and she would need to go to a facility.    Data reviewed today: I reviewed all medications, new labs and imaging results over the last 24 hours. I personally  reviewed no images or EKG's today.    Physical Exam   Vital Signs: Temp: 97.2  F (36.2  C) Temp src: Oral BP: 111/59 Pulse: 72   Resp: 18 SpO2: 93 % O2 Device: None (Room air)    Weight: 284 lbs 11.2 oz  General Appearance: Obese , no distress, alert and conversant  Respiratory: Lungs clear  Cardiovascular: RRR  GI: Nontender, nondistended  Skin: No rash  Extremities: No edema, moving all extremities      Data   Recent Labs   Lab 09/17/22  1241 09/17/22  1116 09/17/22  0747 09/16/22  0809 09/15/22  1050 09/14/22  1759 09/12/22  1102 09/12/22  0719   PLT  --   --   --   --  282  --  314  --    NA  --   --   --   --   --   --   --  140   POTASSIUM  --  3.4  --  3.4  --  3.5  --  3.5   CHLORIDE  --   --   --   --   --   --   --  104   CO2  --   --   --   --   --   --   --  30   BUN  --   --   --   --   --   --   --  27   CR  --   --   --   --   --   --   --  0.70   ANIONGAP  --   --   --   --   --   --   --  6   ASHIA  --   --   --   --   --   --   --  8.7   *  --  99  --   --   --   --  99     No results found for this or any previous visit (from the past 24 hour(s)).  Medications     - MEDICATION INSTRUCTIONS -         ARIPiprazole  10 mg Oral QPM     aspirin  325 mg Oral Daily     atorvastatin  40 mg Oral QPM     COVID-19 mRNA vacc (PFIZER)  30 mcg Intramuscular Q21 Days     enoxaparin ANTICOAGULANT  40 mg Subcutaneous Q12H     furosemide  20 mg Oral BID     [Held by provider] lisinopril  5 mg Oral Daily     LORazepam  0.5 mg Oral Once     miconazole   Topical BID     multivitamin w/minerals  1 tablet Oral Daily     OLANZapine  2.5 mg Oral At Bedtime     polyethylene glycol  17 g Oral Daily     potassium chloride  20 mEq Oral Daily     senna-docusate  1-2 tablet Oral BID     sertraline  50 mg Oral Daily     spironolactone  12.5 mg Oral Daily

## 2022-09-17 NOTE — PLAN OF CARE
Goal Outcome Evaluation:    Summary: CVA R MCA    DATE & TIME: 9/17/22 0712-1108                        Cognitive Concerns/ Orientation : A&O x 2, disoriented to time and situation   BEHAVIOR & AGGRESSION TOOL COLOR: Green     ABNL VS/O2: VSS  MOBILITY: Total. Up with lift to BSC and to chair this shift. She expressed her goal is to return home so activity is encouraged.. Turned and repositioned  PAIN MANAGMENT: denied pain except positional; change in position and pillow placement helpful.  DIET: Regular; poor to fair intake; intake and fluids encouraged  BOWEL/BLADDER: no void this shift; bladder scan at 1430 was 81 ml; fluids encouraged.  She is on morning diuretic.  MD updated.  ABNL LAB/BG:  K+ 3.4, replaced, recheck at 1830  DRAIN/DEVICES: None  TELEMETRY RHYTHM: NA  SKIN: BLE patty, flaky with +2 edema. Dressing to right flank changed  Mild blanchable redness to right buttock and left thigh;   TESTS/PROCEDURES: NA  D/C DATE: Pending placement to TCU  Discharge Barriers: LTC placement. To get COVID vaccine on Monday  OTHER IMPORTANT INFO: WOC following

## 2022-09-17 NOTE — PLAN OF CARE
Goal Outcome Evaluation:  Summary: CVA R MCA    DATE & TIME: 9/16 1500-2330        Cognitive Concerns/ Orientation: A&Ox2, disoriented to time and situation, anxious at times. Boundaries established.  BEHAVIOR & AGGRESSION TOOL COLOR: Green     ABNL VS/O2: VSS RA  MOBILITY: Total/lift. Turned and repositioned assist   PAIN MANAGMENT: denies  DIET: Regular  BOWEL/BLADDER: incontinent, use commode only.  ABNL LAB/BG: NA  DRAIN/DEVICES: None  TELEMETRY RHYTHM: NA  SKIN: +2 edema to BLE. BLE patty, flaky-legs. Dressing to right lower back-changed AM shift and intact. left thigh and coccyx have triad paste applied. Right buttocks and left thigh-red blanchable area from a prior bedpan use. Therefore, BSC to be used.   TESTS/PROCEDURES: NA  D/C DATE: Pending placement  Discharge Barriers: LTC placement, will get covid vaccine on Monday  OTHER IMPORTANT INFO: WOC following  Refused dinner.

## 2022-09-17 NOTE — PLAN OF CARE
DATE & TIME: 9/16/22, 2300 - 1789    Cognitive Concerns/ Orientation : A&O x 2, disoriented to time and situation   BEHAVIOR & AGGRESSION TOOL COLOR: Green   ABNL VS/O2: Vitals deferred overnight. Being done twice daily  MOBILITY: Total. Up with lift to BSC. Turned and repositioned  PAIN MANAGMENT: Prn Oxycodone given for lower back and bilateral leg pain. Helpful  DIET: Regular  BOWEL/BLADDER: Voided in BSC with some urinary incontinence. No BM this shift  ABNL LAB/BG: NA  DRAIN/DEVICES: None  TELEMETRY RHYTHM: NA  SKIN: BLE patty, flaky with +2 edema. Dressing to right lower back remain CDI. Mild blanchable redness to right buttock and left thigh   TESTS/PROCEDURES: NA  D/C DATE: Pending placement  Discharge Barriers: LTC placement. To get COVID vaccine on Monday  OTHER IMPORTANT INFO: WOC following    Goal Outcome Evaluation:    Plan of Care Reviewed With: patient     Overall Patient Progress: no change

## 2022-09-18 NOTE — PLAN OF CARE
Goal Outcome Evaluation:     Summary: CVA R MCA    DATE & TIME: 9/18/22 6589-7016    Cognitive Concerns/ Orientation : A&O x 2, disoriented to time and situation   BEHAVIOR & AGGRESSION TOOL COLOR: Green     ABNL VS/O2: Vital signs stable  MOBILITY: Up with lift. Turned and repositioned; up in chair and commode for breakfast, declined to get up for lunch, states she will again later.  PAIN MANAGMENT: Denied  DIET: Regular; poor intake; fluids provided and encouraged several times  BOWEL/BLADDER: up to BSC, voided 150 ml approx and small soft BM;   ABNL LAB/BG: NA  DRAIN/DEVICES: None  TELEMETRY RHYTHM: NA  SKIN: BLE pale with patty, flaky with +2 edema; leg cares completed & elevated on pillows. Dressing to right flank changed;    TESTS/PROCEDURES: NA  D/C DATE: Pending placement to TCU  Discharge Barriers: LTC placement. To get COVID vaccine on Monday  OTHER IMPORTANT INFO: WOC

## 2022-09-18 NOTE — PLAN OF CARE
Goal Outcome Evaluation:  Summary: CVA R MCA    DATE & TIME: 9/17/22 [id number]                        Cognitive Concerns/ Orientation : A&O x 2, disoriented to time and situation   BEHAVIOR & AGGRESSION TOOL COLOR: Green     ABNL VS/O2: VSS  MOBILITY: Total. Up with lift to BSC . She expressed her goal is to return home so activity is encouraged.. Turned and repositioned  PAIN MANAGMENT: denied pain except positional; change in position and pillow placement helpful.  DIET: Regular; poor to fair intake; intake and fluids encouraged  BOWEL/BLADDER: voided this shift;  fluids encouraged.   ABNL LAB/BG:  K+ 3.8.  DRAIN/DEVICES: None  TELEMETRY RHYTHM: NA  SKIN: BLE patty, flaky with +2 edema. Dressing to right flank intact. blanchable redness to right buttock and left thigh;   TESTS/PROCEDURES: NA  D/C DATE: Pending placement to TCU  Discharge Barriers: LTC placement. To get COVID vaccine on Monday  OTHER IMPORTANT INFO: WOC following

## 2022-09-18 NOTE — PLAN OF CARE
DATE & TIME: 9/17/22, 2300 - 3150   Cognitive Concerns/ Orientation : A&O x 2, disoriented to time and situation   BEHAVIOR & AGGRESSION TOOL COLOR: Green   ABNL VS/O2: Vital signs deferred overnight. Being done twice daily  MOBILITY: Up with lift. Turned and repositioned , assist x 2  PAIN MANAGMENT: Denied  DIET: Regular  BOWEL/BLADDER: Incontinent at times. Bladder scanned for 88 ml. Oral fluids encouraged  ABNL LAB/BG: NA  DRAIN/DEVICES: None  TELEMETRY RHYTHM: NA  SKIN: BLE patty, flaky with +2 edema.Elevated on pillows. Dressing to right flank CDI. Mild blanchabele redness to right buttock and left thigh  TESTS/PROCEDURES: NA  D/C DATE: Pending placement to TCU  Discharge Barriers: LTC placement. To get COVID vaccine on Monday  OTHER IMPORTANT INFO: WOC following    Goal Outcome Evaluation:    Plan of Care Reviewed With: patient     Overall Patient Progress: no change

## 2022-09-18 NOTE — PROGRESS NOTES
[facility]    Medicine Progress Note - Hospitalist Service    Date of Admission:  7/13/2022    Assessment & Plan            Cristy Bailey is a 77 year old female with a PMHx significant for morbid obesity, hypertension, and hyperlipidemia, who presented to [facility] 7/13/2022 with complete left-sided paralysis, left-sided facial droop, and difficulty speaking. CTA head remarkable for right MCA occlusion. She was given tenecteplase and subsequently transferred to [facility] 7/13/2022 for thrombectomy.      Hospital stay complicated by flank wound which required surgical debridement and placement of wound vac and findings LTC placement.     Acute R MCA ischemic stroke with edema and right to left midline shift  S/p tenecteplase and subsequent R MCA mechanical thrombectomy 7/13/22  * Presented to [facility] 7/13 with complete paralysis, left-sided facial droop, and aphasia. Code stroke initiated. Head CT 7/13 showed signs of an evolving R MCA infarct. CTA head 7/13 showed a right carotid terminus occlusion, right middle cerebral artery M1  segment occlusion with poor opacification of the more distal right MCA branches/poor collateral flow. CTA neck 7/13 negative for acute occlusions. Pt given tenecteplase 7/13 at 13:11. Noted to be agitated and was subsequently intubated given need for procedure.   * Subsequently transferred to [facility] 7/13/2022 where she underwent above procedure.   7/14: Extubated. Head CT 7/14 showed evolution of acute infarct involving the R MCA distribution with increased swelling, cortical effacement, and new mild right-to-left midline shift; questionable hypoattenuation involving the bilateral occipital lobes which could be artifactual.   * Additional stroke workup pursued this stay -- echo 7/14 showed EF 50%, grade 1 diastolic dysfunction   * Started ASA and atorvastatin per stroke team.   * Repeat head CT 7/15 showed evolving R MCA stroke with areas of  edema, overall stable.     -- conts on full dose ASA and statin  -- goal SBP <140/90 and met   -- monitored on telemetry for first several wks of hospital stay without abnl rhythm so can likely defer prior recommendations for 30d cardiac event monitor at discharge  -- will need to follow up with [facility] in 6-8 wks (due beginning of September)  Waiting on placement    Anxiety   Cognitive impairment, confusion as above.   * As stay has progressed, patient has consistently been oriented x2 (to self, location), unable to state the year. Also noted to have intermittent situational confusion. States family members have been visiting when they have not.   * Ongoing issues with anxiety this stay -- had been on regimen of Seroquel 12.5mg at dinner and 25mg HS with 12.5mg po q6h prn available. Mirtazapine 7.5mg on 8/15 evening given decreased appetite  * Psych consulted for assistance with ongoing mgmt -- seen on 8/16 and meds adjusted. Seroquel HS changed to Abilify 5mg HS. Recommended Zyptexa 5mg q6h prn for breakthrough insomnia/agitation while going off Seroquel. Consider uptitration of Abilify per psych recs. Advised uptitration of sertraline (25mg x3d then increase to 50mg daily beginning 8/20) and Remeron stopped. Also advised to liberalize Ativan to 0.5mg q6h prn.    * Seen by psych on 8/24, Abilify increased from 5mg to 10mg.  * PRN ativan stopped on 08/27/22 as leading to increased confusion. Hydroxyzine stopped  as well.      -- cont current regimen of meds including Abilify 5mg every evening, Zyprexa 2.5mg HS and sertraline 50mg daily  -- cont prns per psych including Zyprexa 5mg q6h prn  --Giving one-time dose Ativan on 9/17 did for increased anxiety: Patient is crying and calling out frequently through the day.     Urinary retention: Resolved  * Retaining urine on 8/14. UA WNL, not worrisome for infection  * Requiring straight cath x1 on 8/20 PM  * Good UOP, no longer requiring straight cath. No evidence of  obstruction on PVR.      Dysphagia due to CVA: Resolved  * Seen by SLP and noted with dysphagia. Initially required some intermittent fluid boluses.   * Was eventually able to advance to regular diet w/thin liquids     Dyslipidemia  * FLP this stay showed tot cholest 163, HDL 47, LDL 91, .   * Started on atorvastatin 40 mg daily     Volume overload dt diastolic CHF exacerbation: Improved  Essential hypertension  Hypokalemia, recurrent on lasix, and often refusing potassium   * Not on/needing meds PTA.   * As above, echo this stay showed EF 50% with grade I diastolic dysfunction; RV not well visualized but appeared mild-moderately dilated with global systolic function probably mildly reduced.   * BPs were initially soft this stay and required IVFs. BPs improved.   * Developed pedal edema required IV Lasix. Ultimately transition to oral Lasix.  * Started on lisinopril this stay  * BPs improved during stay.   * Lasix decreased from 40mg in AM and 20mg in PM to 20mg BID on 9/3 and added spironolactone 25mg daily    -- reduced lisinopril dose to 5mg daily with parameters on 9/14, will hold lisinopril completely for now as she needs diuretics  -- cont Lasix 20mg po BID  -- reduced spironolactone to 12.5mg daily with parameters on 9/14  -- cont KCl 20mEq po daily  -- f/u bp and adjust closely as indicated     Hypophosphatemia: Resolved     Prolonged QTc   * EKG on 7/13 showed QTc 494.   * Repeat EKG on 7/30 (while on Levaquin) showed QTc table at 475. Has since completed course of abx as below.  * Had been monitoring on telemetry this stay. No concerning findings so tele was ultimately dc'd 8/19     Chronic bilateral LE edema with chronic venous stasis dermatitis and chronic skin changes  Hx of LLE cellulitis  * Was hospitalized in 11/2021 for sepsis dt to pneumonia and LLE cellulitis.   * During this stay, BLE noted to be patty and edematous, no unilateral leg swelling appreciated; LLE had patchy areas of erythema,  no fluctuance, no painful areas with palpation; legs warm to touch. Lymphedema consulted.   -- cont LE elevation, lymphedema wraps     Lower back/R flank wound/abscess, suspect dt pressure type injury, s/p surgical debridement on 7/30/22 and placement of wound vac on 8/2/22,  OA  Hx of bilateral hip replacements   Hx of chronic neck and back pain  * Pt with significant back pain early on in hospital stay. Was requiring IV hydromorphone.  Dose had to be decreased dt somnolence.  * MRI L-spine in 2018 showed multilevel degenerative changes with mild central stenosis. Patient has chronic back pain, reports having it over the last 2 years.   * During this stay, patient was noted with 5-6cm by 2-3cm wound with swelling/fluid/blistering in a fold of her lower back, did not appear infected; this seemed to be the source of her pain. Padded dressing placed and WOC RN ordered. Pain initially improved.   * Was placed on course of Augmentin on 7/24.   * On 7/26, was re-evaluated by WOC RN. Wound appeared more erythematous and purulence expressed. Worsening with shear stress from lift. Procal <0.05, WBC WNL. Abx changed to IV clindamycin.  * Wound cultures obtained. On 7/28, wound grew proteus and staph aureus (MRSA neg). US neg for abscess.   * Lost IV access on 7/28 so abx were changed to oral clindamycin and oral levaquin. IV access re-established but was continued on oral abx.  * General surgery consulted, ultimately underwent excisional debridement of R flank abscess in OR on 7/30. Intraop cultures showed polymicrobial growth with proteus mirabilis x2 strains (both pan-sensitive), corynebacterium striatum and enterococcus faecalis.  * Wound vac placed per general surgery on 8/2 >> subsequently removed during stay.  * ID consulted on 8/2 and abx narrowed to Augmentin alone, completed an additional 5 days of treatment on 8/7.      -- wound per WOC RN, follows weekly  -- prns available for pain  -- cont regular  repositioning     Pressure Injury, left Medial thigh/groin  Pressure Injury Stage: Deep Tissue Pressure Injury (DTPI), hospital acquired, now HAPI stage 2 pressure injury device related purwick external catheter tubing (not the purwick device itself)  - Wound care following    Pressure injury bilateral buttocks  * Noted by nursing on 09/02/22. WOC RN following as above.      Suspected meningioma left parietal region, incidentally seen on admission CT on 7/13  * Head CT 7/13 showed a calcified extra-axial mass overlying the left parietal region measuring 1.4 cm, potentially representing a meningioma.  * Will need serial monitoring OP after discharge.      Anemia, suspect dilutional component: Resolved  * Hgb normal on admit. Hgb 11.5 on 7/16. No overt clinical signs of major bleeding.  * Hgb now stable at 12-13      ntertriginous dermatitis  * Chronic and stable, cont clotrimazole powder     Constipation  * Continue sched bowel regimen     Morbid obesity  * BMI 59 on admission. Recommend aggressive dietary and lifestyle modifications as condition improves     Suspected sleep apnea  * O2 drop to mid 80s overnight 8/14. Briefly placed on 4LPM, which she then removed and then slept okay with sats in 90s thereafter.  * Recommend sleep study as outpatient     Moderate malnutrition in context of acute illness and chronic disease  * Nutritionist following. Appetite poor this stay, needing encouragement to take po.      COVID-19 vaccination status  * Received first dose of vaccine on 8/28, 2nd dose due 9/18/22       Severe malnutrition. In Context of:  Acute illness or injury  Chronic illness or disease  Malnutrition: (8/31)   % Weight Loss:  > 5% in 1 month (severe malnutrition) - significant loss over this admit  % Intake:  </= 50% for >/= 5 days (severe malnutrition) - consistent this admit x49 days  Subcutaneous Fat Loss:  Orbital region moderate depletion - visual, per 8/16 note  Muscle Loss:  Temporal  region moderate depletion - visual, per 8/16 note  Fluid Retention:  Mild generalized edema  - dietitian following          Diet: Advance Diet as Tolerated  Regular Diet Adult Thin Liquids (level 0) (Upright position, alert, and assist as needed)  Room Service    DVT Prophylaxis: Enoxaparin (Lovenox) SQ  [place] Catheter: Not present  Central Lines: None  Cardiac Monitoring: None  Code Status: Full Code      Disposition Plan      Expected Discharge Date: 09/21/2022    Discharge Delays: Placement - LTC  Covid Vaccine for Placement  *Medically Ready for Discharge    Discharge Comments: . Writer resent referrals to [facility], [facility], [facility], [facility] and [facility]. Writer also sent a referral to [facility] and . Will get 2nd dose of covid vaccine later this month.        The patient's care was discussed with the Patient.    Jo White MD  Hospitalist Service  [facility]  Securely message with the Vocera Web Console (learn more here)  Text page via [employer] Paging/Directory         Clinically Significant Risk Factors Present on Admission                      ______________________________________________________________________    Interval History   Stable overnight.  She denies any concerns today.  Tells me that she has difficulty swallowing large pills especially the potassium pill and the multivitamin pill.    Data reviewed today: I reviewed all medications, new labs and imaging results over the last 24 hours. I personally reviewed no images or EKG's today.    Physical Exam   Vital Signs: Temp: 97.6  F (36.4  C) Temp src: Axillary BP: 129/59 Pulse: 55   Resp: 19 SpO2: 92 % O2 Device: None (Room air)    Weight: 283 lbs 11.2 oz  General Appearance: Obese , no distress, alert and conversant  Respiratory: Lungs clear  Cardiovascular: RRR  GI: Nontender, nondistended  Skin: No rash  Extremities: No edema, moving all extremities      Data    Recent Labs   Lab 09/18/22  0844 09/17/22  2032 09/17/22  1241 09/17/22  1116 09/17/22  0747 09/16/22  0809 09/15/22  1050 09/14/22  1759 09/12/22  1102 09/12/22  0719   PLT  --   --   --   --   --   --  282  --  314  --    NA  --   --   --   --   --   --   --   --   --  140   POTASSIUM 3.7 3.8  --  3.4  --    < >  --    < >  --  3.5   CHLORIDE  --   --   --   --   --   --   --   --   --  104   CO2  --   --   --   --   --   --   --   --   --  30   BUN  --   --   --   --   --   --   --   --   --  27   CR  --   --   --   --   --   --   --   --   --  0.70   ANIONGAP  --   --   --   --   --   --   --   --   --  6   ASHIA  --   --   --   --   --   --   --   --   --  8.7   GLC  --   --  108*  --  99  --   --   --   --  99    < > = values in this interval not displayed.     No results found for this or any previous visit (from the past 24 hour(s)).  Medications     - MEDICATION INSTRUCTIONS -         ARIPiprazole  10 mg Oral QPM     aspirin  325 mg Oral Daily     atorvastatin  40 mg Oral QPM     COVID-19 mRNA vacc (PFIZER)  30 mcg Intramuscular Q21 Days     enoxaparin ANTICOAGULANT  40 mg Subcutaneous Q12H     furosemide  20 mg Oral BID     [Held by provider] lisinopril  5 mg Oral Daily     miconazole   Topical BID     multivitamin w/minerals  1 tablet Oral Daily     OLANZapine  2.5 mg Oral At Bedtime     polyethylene glycol  17 g Oral Daily     potassium chloride  20 mEq Oral Daily     senna-docusate  1-2 tablet Oral BID     sertraline  50 mg Oral Daily     spironolactone  12.5 mg Oral Daily

## 2022-09-19 NOTE — PROGRESS NOTES
[facility]    Medicine Progress Note - Hospitalist Service    Date of Admission:  7/13/2022    Assessment & Plan        Cristy Bailey is a 77 year old female with a PMHx significant for morbid obesity, hypertension, and hyperlipidemia, who presented to [facility] 7/13/2022 with complete left-sided paralysis, left-sided facial droop, and difficulty speaking. CTA head remarkable for right MCA occlusion. She was given tenecteplase and subsequently transferred to [facility] 7/13/2022 for thrombectomy.      Hospital stay complicated by flank wound which required surgical debridement and placement of wound vac and findings LTC placement.     Acute R MCA ischemic stroke with edema and right to left midline shift  S/p tenecteplase and subsequent R MCA mechanical thrombectomy 7/13/22  * Presented to [facility] 7/13 with complete paralysis, left-sided facial droop, and aphasia. Code stroke initiated. Head CT 7/13 showed signs of an evolving R MCA infarct. CTA head 7/13 showed a right carotid terminus occlusion, right middle cerebral artery M1  segment occlusion with poor opacification of the more distal right MCA branches/poor collateral flow. CTA neck 7/13 negative for acute occlusions. Pt given tenecteplase 7/13 at 13:11. Noted to be agitated and was subsequently intubated given need for procedure.   * Subsequently transferred to [facility] 7/13/2022 where she underwent above procedure.   7/14: Extubated. Head CT 7/14 showed evolution of acute infarct involving the R MCA distribution with increased swelling, cortical effacement, and new mild right-to-left midline shift; questionable hypoattenuation involving the bilateral occipital lobes which could be artifactual.   * Additional stroke workup pursued this stay -- echo 7/14 showed EF 50%, grade 1 diastolic dysfunction   * Started ASA and atorvastatin per stroke team.   * Repeat head CT 7/15 showed evolving R MCA stroke with areas of  edema, overall stable.   plan  -- conts on full dose ASA and statin  -- goal SBP <140/90 and met   -- monitored on telemetry for first several wks of hospital stay without abnl rhythm so can likely defer prior recommendations for 30d cardiac event monitor at discharge  -- will need to follow up with [facility] in 6-8 wks (due beginning of September)  Waiting on placement    Anxiety   Cognitive impairment, confusion as above.   * As stay has progressed, patient has consistently been oriented x2 (to self, location), unable to state the year. Also noted to have intermittent situational confusion. States family members have been visiting when they have not.   * Ongoing issues with anxiety this stay -- had been on regimen of Seroquel 12.5mg at dinner and 25mg HS with 12.5mg po q6h prn available. Mirtazapine 7.5mg on 8/15 evening given decreased appetite  * Psych consulted for assistance with ongoing mgmt -- seen on 8/16 and meds adjusted. Seroquel HS changed to Abilify 5mg HS. Recommended Zyptexa 5mg q6h prn for breakthrough insomnia/agitation while going off Seroquel. Consider uptitration of Abilify per psych recs. Advised uptitration of sertraline (25mg x3d then increase to 50mg daily beginning 8/20) and Remeron stopped. Also advised to liberalize Ativan to 0.5mg q6h prn.    * Seen by psych on 8/24, Abilify increased from 5mg to 10mg.  * PRN ativan stopped on 08/27/22 as leading to increased confusion. Hydroxyzine stopped  as well.      -- cont current regimen of meds including Abilify 5mg every evening, Zyprexa 2.5mg HS and sertraline 50mg daily  -- cont prns per psych including Zyprexa 5mg q6h prn  --Giving one-time dose Ativan on 9/17 did for increased anxiety: Patient is crying and calling out frequently through the day.     Urinary retention: Resolved  * Retaining urine on 8/14. UA WNL, not worrisome for infection  * Requiring straight cath x1 on 8/20 PM  * Good UOP, no longer requiring straight cath. No evidence of  obstruction on PVR.      Dysphagia due to CVA: Resolved  * Seen by SLP and noted with dysphagia. Initially required some intermittent fluid boluses.   * Was eventually able to advance to regular diet w/thin liquids     Dyslipidemia  * FLP this stay showed tot cholest 163, HDL 47, LDL 91, .   * Started on atorvastatin 40 mg daily     Volume overload dt diastolic CHF exacerbation: Improved  Essential hypertension  Hypokalemia, recurrent on lasix, and often refusing potassium   * Not on/needing meds PTA.   * As above, echo this stay showed EF 50% with grade I diastolic dysfunction; RV not well visualized but appeared mild-moderately dilated with global systolic function probably mildly reduced.   * BPs were initially soft this stay and required IVFs. BPs improved.   * Developed pedal edema required IV Lasix. Ultimately transition to oral Lasix.  * Started on lisinopril this stay  * BPs improved during stay.   * Lasix decreased from 40mg in AM and 20mg in PM to 20mg BID on 9/3 and added spironolactone 25mg daily    -- reduced lisinopril dose to 5mg daily with parameters on 9/14, will hold lisinopril completely for now as she needs diuretics  -- cont Lasix 20mg po BID  -- reduced spironolactone to 12.5mg daily with parameters on 9/14  -- cont KCl 20mEq po daily  -- f/u bp and adjust closely as indicated     Hypophosphatemia: Resolved     Prolonged QTc   * EKG on 7/13 showed QTc 494.   * Repeat EKG on 7/30 (while on Levaquin) showed QTc table at 475. Has since completed course of abx as below.  * Had been monitoring on telemetry this stay. No concerning findings so tele was ultimately dc'd 8/19     Chronic bilateral LE edema with chronic venous stasis dermatitis and chronic skin changes  Hx of LLE cellulitis  * Was hospitalized in 11/2021 for sepsis dt to pneumonia and LLE cellulitis.   * During this stay, BLE noted to be patty and edematous, no unilateral leg swelling appreciated; LLE had patchy areas of erythema,  no fluctuance, no painful areas with palpation; legs warm to touch. Lymphedema consulted.   -- cont LE elevation, lymphedema wraps     Lower back/R flank wound/abscess, suspect dt pressure type injury, s/p surgical debridement on 7/30/22 and placement of wound vac on 8/2/22,  OA  Hx of bilateral hip replacements   Hx of chronic neck and back pain  * Pt with significant back pain early on in hospital stay. Was requiring IV hydromorphone.  Dose had to be decreased dt somnolence.  * MRI L-spine in 2018 showed multilevel degenerative changes with mild central stenosis. Patient has chronic back pain, reports having it over the last 2 years.   * During this stay, patient was noted with 5-6cm by 2-3cm wound with swelling/fluid/blistering in a fold of her lower back, did not appear infected; this seemed to be the source of her pain. Padded dressing placed and WOC RN ordered. Pain initially improved.   * Was placed on course of Augmentin on 7/24.   * On 7/26, was re-evaluated by WOC RN. Wound appeared more erythematous and purulence expressed. Worsening with shear stress from lift. Procal <0.05, WBC WNL. Abx changed to IV clindamycin.  * Wound cultures obtained. On 7/28, wound grew proteus and staph aureus (MRSA neg). US neg for abscess.   * Lost IV access on 7/28 so abx were changed to oral clindamycin and oral levaquin. IV access re-established but was continued on oral abx.  * General surgery consulted, ultimately underwent excisional debridement of R flank abscess in OR on 7/30. Intraop cultures showed polymicrobial growth with proteus mirabilis x2 strains (both pan-sensitive), corynebacterium striatum and enterococcus faecalis.  * Wound vac placed per general surgery on 8/2 >> subsequently removed during stay.  * ID consulted on 8/2 and abx narrowed to Augmentin alone, completed an additional 5 days of treatment on 8/7.      -- wound per WOC RN, follows weekly  -- prns available for pain  -- cont regular  repositioning     Pressure Injury, left Medial thigh/groin  Pressure Injury Stage: Deep Tissue Pressure Injury (DTPI), hospital acquired, now HAPI stage 2 pressure injury device related purwick external catheter tubing (not the purwick device itself)  - Wound care following    Pressure injury bilateral buttocks  * Noted by nursing on 09/02/22. WOC RN following as above.      Suspected meningioma left parietal region, incidentally seen on admission CT on 7/13  * Head CT 7/13 showed a calcified extra-axial mass overlying the left parietal region measuring 1.4 cm, potentially representing a meningioma.  * Will need serial monitoring OP after discharge.      Anemia, suspect dilutional component: Resolved  * Hgb normal on admit. Hgb 11.5 on 7/16. No overt clinical signs of major bleeding.  * Hgb now stable at 12-13      ntertriginous dermatitis  * Chronic and stable, cont clotrimazole powder     Constipation  * Continue sched bowel regimen     Morbid obesity  * BMI 59 on admission. Recommend aggressive dietary and lifestyle modifications as condition improves     Suspected sleep apnea  * O2 drop to mid 80s overnight 8/14. Briefly placed on 4LPM, which she then removed and then slept okay with sats in 90s thereafter.  * Recommend sleep study as outpatient     Moderate malnutrition in context of acute illness and chronic disease  * Nutritionist following. Appetite poor this stay, needing encouragement to take po.      COVID-19 vaccination status  * Received first dose of vaccine on 8/28, 2nd dose due 9/18/22       Severe malnutrition. In Context of:  Acute illness or injury  Chronic illness or disease  Malnutrition: (8/31)   % Weight Loss:  > 5% in 1 month (severe malnutrition) - significant loss over this admit  % Intake:  </= 50% for >/= 5 days (severe malnutrition) - consistent this admit x49 days  Subcutaneous Fat Loss:  Orbital region moderate depletion - visual, per 8/16 note  Muscle Loss:  Temporal  region moderate depletion - visual, per 8/16 note  Fluid Retention:  Mild generalized edema  - dietitian following, recommend alternative feeding source? Not clear she would tolerate a tube feed with her confusion, will monitor, perhaps consider some carb counting          Diet: Advance Diet as Tolerated  Regular Diet Adult Thin Liquids (level 0) (Upright position, alert, and assist as needed)  Room Service    DVT Prophylaxis: Enoxaparin (Lovenox) SQ  [place] Catheter: Not present  Central Lines: None  Cardiac Monitoring: None  Code Status: Full Code      Disposition Plan      Expected Discharge Date: 09/21/2022    Discharge Delays: Placement - LTC  Covid Vaccine for Placement  *Medically Ready for Discharge    Discharge Comments: . Writer resent referrals to [facility], [facility], [facility], [facility] and [facility]. Writer also sent a referral to [facility]heus [facility] and . Will get 2nd dose of covid vaccine later this month.        The patient's care was discussed with the Patient.    Hamilton Higginbotham DO  Hospitalist Service  [facility]  Securely message with the Vocera Web Console (learn more here)  Text page via [employer] network Paging/Directory         Clinically Significant Risk Factors Present on Admission                      ______________________________________________________________________    Interval History   Stable overnight.  Resting, but with no complaints    Data reviewed today: I reviewed all medications, new labs and imaging results over the last 24 hours. I personally reviewed no images or EKG's today.    Physical Exam   Vital Signs: Temp: 97.4  F (36.3  C) Temp src: Oral BP: (!) 143/80 Pulse: 111   Resp: 18 SpO2: 94 % O2 Device: None (Room air)    Weight: 284 lbs 6.4 oz  General Appearance: Obese , no distress, alert and conversant  Respiratory: Lungs clear  Cardiovascular: RRR  GI: Nontender, nondistended  Skin: No rash  Extremities: No  edema, moving all extremities      Data   Recent Labs   Lab 09/19/22  1458 09/19/22  0907 09/18/22  1436 09/18/22  0844 09/17/22  2032 09/17/22  1241 09/17/22  1116 09/17/22  0747 09/16/22  0809 09/15/22  1050   PLT  --   --  274  --   --   --   --   --   --  282   NA  --  139  --   --   --   --   --   --   --   --    POTASSIUM 4.0 3.3*  --  3.7   < >  --    < >  --    < >  --    CHLORIDE  --  103  --   --   --   --   --   --   --   --    CO2  --  30  --   --   --   --   --   --   --   --    BUN  --  17  --   --   --   --   --   --   --   --    CR  --  0.69  --   --   --   --   --   --   --   --    ANIONGAP  --  6  --   --   --   --   --   --   --   --    ASHIA  --  8.9  --   --   --   --   --   --   --   --    GLC  --  107*  --   --   --  108*  --  99  --   --     < > = values in this interval not displayed.     No results found for this or any previous visit (from the past 24 hour(s)).  Medications     - MEDICATION INSTRUCTIONS -         ARIPiprazole  10 mg Oral QPM     aspirin  325 mg Oral Daily     atorvastatin  40 mg Oral QPM     COVID-19 mRNA vacc (PFIZER)  30 mcg Intramuscular Q21 Days     enoxaparin ANTICOAGULANT  40 mg Subcutaneous Q12H     furosemide  20 mg Oral BID     [Held by provider] lisinopril  5 mg Oral Daily     miconazole   Topical BID     multivitamins w/minerals  15 mL Oral Daily     OLANZapine  2.5 mg Oral At Bedtime     polyethylene glycol  17 g Oral Daily     potassium chloride  20 mEq Oral Daily     senna-docusate  1-2 tablet Oral BID     sertraline  50 mg Oral Daily     spironolactone  12.5 mg Oral Daily

## 2022-09-19 NOTE — PLAN OF CARE
Goal Outcome Evaluation:     Summary: CVA R MCA    DATE & TIME: 9/18/22, 5140-6280                       Cognitive Concerns/ Orientation : A&O x 2, disoriented to time and situation   BEHAVIOR & AGGRESSION TOOL COLOR: Green  ABNL VS/O2: VSS; somewhat tachy  MOBILITY: Total, up with lift. Up to chair and commode x 2;  Turned and repositioned  PAIN MANAGMENT: denied pain  DIET: Regular - very poor intake; fluids pushed but declined - did take fluids with pills.    BOWEL/BLADDER:  Voided approximately 150 ml of urine with a smear of loose brown BM  ABNL LAB/BG: K+ 3.2 - replaced, recheck 4.0  DRAIN/DEVICES: None  TELEMETRY RHYTHM: NA  SKIN: BLE patty, flaky with +2 edema. Elevated on pillows. Dressing to right flank region changed.    TESTS/PROCEDURES: NA  D/C DATE: Pending placement  Discharge Barriers: LTC placement  OTHER IMPORTANT INFO: WOC and SW following

## 2022-09-19 NOTE — CONSULTS
Clinical Nutrition Brief Note    Received RN consult for poor intake   RD following patient throughout prolonged admission. Refer to last assessment note dated 9/14  Continue to recommend alternate means of nutrition given ongoing poor PO and refusal of meals and supplements for weeks   Need to consider how a feeding tube would impact discharge and insurance coverage     Formal follow-up assessment note to be completed later this week     Pauly Castillo RD, LD  Clinical Dietitian   [facility], [facility], Ortho spine  Pager: [phone]

## 2022-09-19 NOTE — PLAN OF CARE
Goal Outcome Evaluation:  Summary: CVA R MCA    DATE & TIME: 9/18/22 0015-4322    Cognitive Concerns/ Orientation : A&O x 2, disoriented to time and situation   BEHAVIOR & AGGRESSION TOOL COLOR: Green     ABNL VS/O2: Vital signs stable  MOBILITY: Up with lift. Turned and repositioned; up in commode  PAIN MANAGMENT: Denied  DIET: Regular; poor intake; fluids provided and encouraged several times   BOWEL/BLADDER: up to BSC, voided 400 ml approx and medium BM loose.  ABNL LAB/BG: NA  DRAIN/DEVICES: None  TELEMETRY RHYTHM: NA  SKIN: BLE pale with patty, flaky with +2 edema; leg cares completed & elevated on pillows. Dressing intact.    TESTS/PROCEDURES: NA  D/C DATE: Pending placement to TCU  Discharge Barriers: LTC placement. To get COVID vaccine on Monday  OTHER IMPORTANT INFO: WOC following.

## 2022-09-19 NOTE — PLAN OF CARE
DATE & TIME: 9/18/22, 4430 - 4597    Cognitive Concerns/ Orientation : A&O x 2, disoriented to time and situation   BEHAVIOR & AGGRESSION TOOL COLOR: Green  ABNL VS/O2: Vital signs deferred overnight. Being done twice daily  MOBILITY: Total, up with lift. Turned and repositioned  PAIN MANAGMENT: Prn Oxycodone given for pain to lower back and BLE. Helpful per patient  DIET: Regular  BOWEL/BLADDER: Incontinent at times. Voided approximately 250 ml of urine with a smear of loose brown BM  ABNL LAB/BG: NA  DRAIN/DEVICES: None  TELEMETRY RHYTHM: NA  SKIN: BLE ptaty, flaky with +2 edema. Elevated on pillows. Dressing to right flank region CDI  TESTS/PROCEDURES: NA  D/C DATE: Pending placement  Discharge Barriers: LTC placement  OTHER IMPORTANT INFO: WOC and SW following    Goal Outcome Evaluation:    Plan of Care Reviewed With: patient     Overall Patient Progress: no change

## 2022-09-20 NOTE — PLAN OF CARE
Summary: CVA R MCA  DATE & TIME: 9/19/22 1900- 9/20/22 5526                 Cognitive Concerns/ Orientation : A&O x 2, disoriented to time and situation. No insight to situation. Tearful at times  BEHAVIOR & AGGRESSION TOOL COLOR: Green  ABNL VS/O2: VSS except tachy at times on RA  MOBILITY: Total, up with lift. Turned and repositioned.   PAIN MANAGMENT: denied pain  DIET: Regular - very poor intake, encourage pt to eat.  BOWEL/BLADDER: Incontinent at times, up to BSC  ABNL LAB/BG: None new  DRAIN/DEVICES: None  TELEMETRY RHYTHM: NA  SKIN: BLE patty, flaky with +2 edema. Elevated on pillows. Dressing to right flank region CDI. Edema wear in place  TESTS/PROCEDURES: NA  D/C DATE: Pending placement  Discharge Barriers: LTC placement  OTHER IMPORTANT INFO: WOC and SW following

## 2022-09-20 NOTE — PLAN OF CARE
Goal Outcome Evaluation:    Summary: CVA R MCA  DATE & TIME: 9/19/22, 2946-0227                   Cognitive Concerns/ Orientation : A&O x 2, disoriented to time and situation. No insight to situation. Tearful at times  BEHAVIOR & AGGRESSION TOOL COLOR: Green  ABNL VS/O2: VSS; intermittent tachycardia, denies symptoms  MOBILITY: Total, up with lift. Turned and repositioned. Encouraged ROM   PAIN MANAGMENT: denied pain  DIET: Regular - very poor intake, per MD encourage patient to eat and drink but refused    BOWEL/BLADDER: BS active x 4. X 1 small BM, monitor urine output, RUBÉN today  ABNL LAB/BG: K+ 3.2 - replaced, recheck 4.0  DRAIN/DEVICES: None  TELEMETRY RHYTHM: NA  SKIN: BLE patty, flaky with +2 edema. Elevated on pillows. Dressing to right flank region CDI. Edema wear in place  TESTS/PROCEDURES: NA  D/C DATE: Pending placement  Discharge Barriers: LTC placement  OTHER IMPORTANT INFO: PRN Zyprexa given x 1 for agitation. WOC and SW following

## 2022-09-20 NOTE — PLAN OF CARE
Goal Outcome Evaluation:    Summary: CVA R MCA  DATE & TIME: 9/20/22 9680-7957                Cognitive Concerns/ Orientation : A&O x 2, disoriented to time and situation. No insight to situation. Tearful at times  BEHAVIOR & AGGRESSION TOOL COLOR: Green  ABNL VS/O2: VSS, on RA  MOBILITY: Total, up with lift, chair. Turned and repositioned.   PAIN MANAGMENT: denied pain, given zyprexa for anxiety.   DIET: Regular - very poor intake, needs encouragement to eat.   BOWEL/BLADDER: Incontinent, purewick in place, No urine output, bladder scanned  ABNL LAB/BG: K+ 3.6, Mag 2.1  DRAIN/DEVICES: None  TELEMETRY RHYTHM: NA  SKIN: BLE patty, flaky with +2 edema. Elevated on pillows. Dressings changed. Edema wear in place.  TESTS/PROCEDURES: NA  D/C DATE: Pending placement  Discharge Barriers: LTC placement  OTHER IMPORTANT INFO: WOC and SW following.

## 2022-09-20 NOTE — PROGRESS NOTES
[facility]    Medicine Progress Note - Hospitalist Service    Date of Admission:  7/13/2022    Assessment & Plan        Cristy Bailey is a 77 year old female with a PMHx significant for morbid obesity, hypertension, and hyperlipidemia, who presented to [facility] 7/13/2022 with complete left-sided paralysis, left-sided facial droop, and difficulty speaking. CTA head remarkable for right MCA occlusion. She was given tenecteplase and subsequently transferred to [facility] 7/13/2022 for thrombectomy.      Hospital stay complicated by flank wound which required surgical debridement and placement of wound vac and findings LTC placement.     Acute R MCA ischemic stroke with edema and right to left midline shift  S/p tenecteplase and subsequent R MCA mechanical thrombectomy 7/13/22  * Presented to [facility] 7/13 with complete paralysis, left-sided facial droop, and aphasia. Code stroke initiated. Head CT 7/13 showed signs of an evolving R MCA infarct. CTA head 7/13 showed a right carotid terminus occlusion, right middle cerebral artery M1  segment occlusion with poor opacification of the more distal right MCA branches/poor collateral flow. CTA neck 7/13 negative for acute occlusions. Pt given tenecteplase 7/13 at 13:11. Noted to be agitated and was subsequently intubated given need for procedure.   * Subsequently transferred to [facility] 7/13/2022 where she underwent above procedure.   7/14: Extubated. Head CT 7/14 showed evolution of acute infarct involving the R MCA distribution with increased swelling, cortical effacement, and new mild right-to-left midline shift; questionable hypoattenuation involving the bilateral occipital lobes which could be artifactual.   * Additional stroke workup pursued this stay -- echo 7/14 showed EF 50%, grade 1 diastolic dysfunction   * Started ASA and atorvastatin per stroke team.   * Repeat head CT 7/15 showed evolving R MCA stroke with areas of  edema, overall stable.   plan  -- conts on full dose ASA and statin  -- goal SBP <140/90 and met   -- monitored on telemetry for first several wks of hospital stay without abnl rhythm so can likely defer prior recommendations for 30d cardiac event monitor at discharge  -- will need to follow up with [facility] in 6-8 wks (due beginning of September)  Waiting on placement    Anxiety   Cognitive impairment, confusion as above.   * As stay has progressed, patient has consistently been oriented x2 (to self, location), unable to state the year. Also noted to have intermittent situational confusion. States family members have been visiting when they have not.   * Ongoing issues with anxiety this stay -- had been on regimen of Seroquel 12.5mg at dinner and 25mg HS with 12.5mg po q6h prn available. Mirtazapine 7.5mg on 8/15 evening given decreased appetite  * Psych consulted for assistance with ongoing mgmt -- seen on 8/16 and meds adjusted. Seroquel HS changed to Abilify 5mg HS. Recommended Zyptexa 5mg q6h prn for breakthrough insomnia/agitation while going off Seroquel. Consider uptitration of Abilify per psych recs. Advised uptitration of sertraline (25mg x3d then increase to 50mg daily beginning 8/20) and Remeron stopped. Also advised to liberalize Ativan to 0.5mg q6h prn.    * Seen by psych on 8/24, Abilify increased from 5mg to 10mg.  * PRN ativan stopped on 08/27/22 as leading to increased confusion. Hydroxyzine stopped  as well.      -- cont current regimen of meds including Abilify 5mg every evening, Zyprexa 2.5mg HS and sertraline 50mg daily  -- cont prns per psych including Zyprexa 5mg q6h prn  --Giving one-time dose Ativan on 9/17 did for increased anxiety: Patient is crying and calling out frequently through the day.     Urinary retention: Resolved  * Retaining urine on 8/14. UA WNL, not worrisome for infection  * Requiring straight cath x1 on 8/20 PM  * Good UOP, no longer requiring straight cath. No evidence of  obstruction on PVR.      Dysphagia due to CVA: Resolved  * Seen by SLP and noted with dysphagia. Initially required some intermittent fluid boluses.   * Was eventually able to advance to regular diet w/thin liquids     Dyslipidemia  * FLP this stay showed tot cholest 163, HDL 47, LDL 91, .   * Started on atorvastatin 40 mg daily     Volume overload dt diastolic CHF exacerbation: Improved  Essential hypertension  Hypokalemia, recurrent on lasix, and often refusing potassium   * Not on/needing meds PTA.   * As above, echo this stay showed EF 50% with grade I diastolic dysfunction; RV not well visualized but appeared mild-moderately dilated with global systolic function probably mildly reduced.   * BPs were initially soft this stay and required IVFs. BPs improved.   * Developed pedal edema required IV Lasix. Ultimately transition to oral Lasix.  * Started on lisinopril this stay  * BPs improved during stay.   * Lasix decreased from 40mg in AM and 20mg in PM to 20mg BID on 9/3 and added spironolactone 25mg daily    -- reduced lisinopril dose to 5mg daily with parameters on 9/14, will hold lisinopril completely for now as she needs diuretics  -- cont Lasix 20mg po BID  -- reduced spironolactone to 12.5mg daily with parameters on 9/14  -- cont KCl 20mEq po daily  -- f/u bp and adjust closely as indicated     Hypophosphatemia: Resolved     Prolonged QTc   * EKG on 7/13 showed QTc 494.   * Repeat EKG on 7/30 (while on Levaquin) showed QTc table at 475. Has since completed course of abx as below.  * Had been monitoring on telemetry this stay. No concerning findings so tele was ultimately dc'd 8/19     Chronic bilateral LE edema with chronic venous stasis dermatitis and chronic skin changes  Hx of LLE cellulitis  * Was hospitalized in 11/2021 for sepsis dt to pneumonia and LLE cellulitis.   * During this stay, BLE noted to be patty and edematous, no unilateral leg swelling appreciated; LLE had patchy areas of erythema,  no fluctuance, no painful areas with palpation; legs warm to touch. Lymphedema consulted.   -- cont LE elevation, lymphedema wraps     Lower back/R flank wound/abscess, suspect dt pressure type injury, s/p surgical debridement on 7/30/22 and placement of wound vac on 8/2/22,  OA  Hx of bilateral hip replacements   Hx of chronic neck and back pain  * Pt with significant back pain early on in hospital stay. Was requiring IV hydromorphone.  Dose had to be decreased dt somnolence.  * MRI L-spine in 2018 showed multilevel degenerative changes with mild central stenosis. Patient has chronic back pain, reports having it over the last 2 years.   * During this stay, patient was noted with 5-6cm by 2-3cm wound with swelling/fluid/blistering in a fold of her lower back, did not appear infected; this seemed to be the source of her pain. Padded dressing placed and WOC RN ordered. Pain initially improved.   * Was placed on course of Augmentin on 7/24.   * On 7/26, was re-evaluated by WOC RN. Wound appeared more erythematous and purulence expressed. Worsening with shear stress from lift. Procal <0.05, WBC WNL. Abx changed to IV clindamycin.  * Wound cultures obtained. On 7/28, wound grew proteus and staph aureus (MRSA neg). US neg for abscess.   * Lost IV access on 7/28 so abx were changed to oral clindamycin and oral levaquin. IV access re-established but was continued on oral abx.  * General surgery consulted, ultimately underwent excisional debridement of R flank abscess in OR on 7/30. Intraop cultures showed polymicrobial growth with proteus mirabilis x2 strains (both pan-sensitive), corynebacterium striatum and enterococcus faecalis.  * Wound vac placed per general surgery on 8/2 >> subsequently removed during stay.  * ID consulted on 8/2 and abx narrowed to Augmentin alone, completed an additional 5 days of treatment on 8/7.      -- wound per WOC RN, follows weekly  -- prns available for pain  -- cont regular  repositioning     Pressure Injury, left Medial thigh/groin  Pressure Injury Stage: Deep Tissue Pressure Injury (DTPI), hospital acquired, now HAPI stage 2 pressure injury device related purwick external catheter tubing (not the purwick device itself)  - Wound care following    Pressure injury bilateral buttocks  * Noted by nursing on 09/02/22. WOC RN following as above.      Suspected meningioma left parietal region, incidentally seen on admission CT on 7/13  * Head CT 7/13 showed a calcified extra-axial mass overlying the left parietal region measuring 1.4 cm, potentially representing a meningioma.  * Will need serial monitoring OP after discharge.      Anemia, suspect dilutional component: Resolved  * Hgb normal on admit. Hgb 11.5 on 7/16. No overt clinical signs of major bleeding.  * Hgb now stable at 12-13      ntertriginous dermatitis  * Chronic and stable, cont clotrimazole powder     Constipation  * Continue sched bowel regimen     Morbid obesity  * BMI 59 on admission. Recommend aggressive dietary and lifestyle modifications as condition improves     Suspected sleep apnea  * O2 drop to mid 80s overnight 8/14. Briefly placed on 4LPM, which she then removed and then slept okay with sats in 90s thereafter.  * Recommend sleep study as outpatient     Moderate malnutrition in context of acute illness and chronic disease  * Nutritionist following. Appetite poor this stay, needing encouragement to take po.      COVID-19 vaccination status  * Received first dose of vaccine on 8/28, 2nd dose due 9/18/22       Severe malnutrition. In Context of:  Acute illness or injury  Chronic illness or disease  Malnutrition: (8/31)   % Weight Loss:  > 5% in 1 month (severe malnutrition) - significant loss over this admit  % Intake:  </= 50% for >/= 5 days (severe malnutrition) - consistent this admit x49 days  Subcutaneous Fat Loss:  Orbital region moderate depletion - visual, per 8/16 note  Muscle Loss:  Temporal  region moderate depletion - visual, per 8/16 note  Fluid Retention:  Mild generalized edema  - dietitian following, recommend alternative feeding source? Not clear she would tolerate a tube feed with her confusion, will monitor, perhaps consider some carb counting          Diet: Advance Diet as Tolerated  Regular Diet Adult Thin Liquids (level 0) (Upright position, alert, and assist as needed)  Room Service    DVT Prophylaxis: Enoxaparin (Lovenox) SQ  [place] Catheter: Not present  Central Lines: None  Cardiac Monitoring: None  Code Status: Full Code      Disposition Plan     Expected Discharge Date: 09/21/2022    Discharge Delays: Placement - LTC  Covid Vaccine for Placement  *Medically Ready for Discharge    Discharge Comments: . Writer resent referrals to [facility], [facility], [facility], [facility] and [facility]. Writer also sent a referral to matheus Hua and . Will get 2nd dose of covid vaccine later this month.        The patient's care was discussed with the Patient.    Hamilton Higginbotham DO  Hospitalist Service  [facility]  Securely message with the Vocera Web Console (learn more here)  Text page via [employer] Paging/Directory         Clinically Significant Risk Factors Present on Admission                      ______________________________________________________________________    Interval History   Stable overnight.  No acute complaints    Data reviewed today: I reviewed all medications, new labs and imaging results over the last 24 hours. I personally reviewed no images or EKG's today.    Physical Exam   Vital Signs: Temp: 98  F (36.7  C) Temp src: Oral BP: 114/66 Pulse: 65   Resp: 16 SpO2: 94 % O2 Device: None (Room air)    Weight: 284 lbs 3.2 oz  General Appearance: Obese , no distress, alert and conversant  Respiratory: Lungs clear  Cardiovascular: RRR  GI: Nontender, nondistended  Skin: No rash  Extremities: No edema, moving all  extremities      Data   Recent Labs   Lab 09/19/22  1458 09/19/22  0907 09/18/22  1436 09/18/22  0844 09/17/22  2032 09/17/22  1241 09/17/22  1116 09/17/22  0747 09/16/22  0809 09/15/22  1050   PLT  --   --  274  --   --   --   --   --   --  282   NA  --  139  --   --   --   --   --   --   --   --    POTASSIUM 4.0 3.3*  --  3.7   < >  --    < >  --    < >  --    CHLORIDE  --  103  --   --   --   --   --   --   --   --    CO2  --  30  --   --   --   --   --   --   --   --    BUN  --  17  --   --   --   --   --   --   --   --    CR  --  0.69  --   --   --   --   --   --   --   --    ANIONGAP  --  6  --   --   --   --   --   --   --   --    ASHIA  --  8.9  --   --   --   --   --   --   --   --    GLC  --  107*  --   --   --  108*  --  99  --   --     < > = values in this interval not displayed.     No results found for this or any previous visit (from the past 24 hour(s)).  Medications     - MEDICATION INSTRUCTIONS -         ARIPiprazole  10 mg Oral QPM     aspirin  325 mg Oral Daily     atorvastatin  40 mg Oral QPM     COVID-19 mRNA vacc (PFIZER)  30 mcg Intramuscular Q21 Days     enoxaparin ANTICOAGULANT  40 mg Subcutaneous Q12H     furosemide  20 mg Oral BID     [Held by provider] lisinopril  5 mg Oral Daily     miconazole   Topical BID     multivitamins w/minerals  15 mL Oral Daily     OLANZapine  2.5 mg Oral At Bedtime     polyethylene glycol  17 g Oral Daily     potassium chloride  20 mEq Oral Daily     senna-docusate  1-2 tablet Oral BID     sertraline  50 mg Oral Daily     spironolactone  12.5 mg Oral Daily

## 2022-09-21 NOTE — PLAN OF CARE
Summary: CVA R MCA  DATE & TIME: 9/20/22 1900- 9/21/22 1184              Cognitive Concerns/ Orientation : A&O x 2, disoriented to time and situation. No insight to situation. Tearful at times  BEHAVIOR & AGGRESSION TOOL COLOR: Green  ABNL VS/O2: VSS, on RA  MOBILITY: Total, up with lift, chair. Turned and repositioned.   PAIN MANAGMENT: denied pain  DIET: Regular - very poor intake, needs a lot of encouragement to eat.   BOWEL/BLADDER: Incontinent at times purewick in place, improved UOP this shift. Encouraged fluids. No BM  ABNL LAB/BG: None new  DRAIN/DEVICES: None  TELEMETRY RHYTHM: NA  SKIN: BLE patty, flaky with +2 edema. Elevated on pillows. Edema wear in place. Dressing on R flank CDI.  TESTS/PROCEDURES: NA  D/C DATE: Pending placement  Discharge Barriers: LTC placement  OTHER IMPORTANT INFO: WOC and SW following.

## 2022-09-21 NOTE — PROGRESS NOTES
[facility]    Medicine Progress Note - Hospitalist Service    Date of Admission:  7/13/2022    Assessment & Plan        Cristy Bailey is a 77 year old female with a PMHx significant for morbid obesity, hypertension, and hyperlipidemia, who presented to [facility] 7/13/2022 with complete left-sided paralysis, left-sided facial droop, and difficulty speaking. CTA head remarkable for right MCA occlusion. She was given tenecteplase and subsequently transferred to [facility] 7/13/2022 for thrombectomy.      Hospital stay complicated by flank wound which required surgical debridement and placement of wound vac and findings LTC placement.     Acute R MCA ischemic stroke with edema and right to left midline shift  S/p tenecteplase and subsequent R MCA mechanical thrombectomy 7/13/22  * Presented to [facility] 7/13 with complete paralysis, left-sided facial droop, and aphasia. Code stroke initiated. Head CT 7/13 showed signs of an evolving R MCA infarct. CTA head 7/13 showed a right carotid terminus occlusion, right middle cerebral artery M1  segment occlusion with poor opacification of the more distal right MCA branches/poor collateral flow. CTA neck 7/13 negative for acute occlusions. Pt given tenecteplase 7/13 at 13:11. Noted to be agitated and was subsequently intubated given need for procedure.   * Subsequently transferred to [facility] 7/13/2022 where she underwent above procedure.   7/14: Extubated. Head CT 7/14 showed evolution of acute infarct involving the R MCA distribution with increased swelling, cortical effacement, and new mild right-to-left midline shift; questionable hypoattenuation involving the bilateral occipital lobes which could be artifactual.   * Additional stroke workup pursued this stay -- echo 7/14 showed EF 50%, grade 1 diastolic dysfunction   * Started ASA and atorvastatin per stroke team.   * Repeat head CT 7/15 showed evolving R MCA stroke with areas of  edema, overall stable.   plan  -- conts on full dose ASA and statin  -- goal SBP <140/90 and met   -- monitored on telemetry for first several wks of hospital stay without abnl rhythm so can likely defer prior recommendations for 30d cardiac event monitor at discharge  -- will need to follow up with [facility] in 6-8 wks (due beginning of September)  Waiting on placement    Severe malnutrition in context of acute illness and chronic disease  * Nutritionist following. Appetite poor this stay, needing encouragement to take po.   *there is some discussion about needing feeding support, but patient does not want to have a feeding tube.  Plan  - encourage oral intake  - patient does not want a feeding tube at this point. She is intermittently confused with poor insight, but would avoid invasive management for now    Anxiety   Cognitive impairment, confusion as above.   * As stay has progressed, patient has consistently been oriented x2 (to self, location), unable to state the year. Also noted to have intermittent situational confusion. States family members have been visiting when they have not.   * Ongoing issues with anxiety this stay -- had been on regimen of Seroquel 12.5mg at dinner and 25mg HS with 12.5mg po q6h prn available. Mirtazapine 7.5mg on 8/15 evening given decreased appetite  * Psych consulted for assistance with ongoing mgmt -- seen on 8/16 and meds adjusted. Seroquel HS changed to Abilify 5mg HS. Recommended Zyptexa 5mg q6h prn for breakthrough insomnia/agitation while going off Seroquel. Consider uptitration of Abilify per psych recs. Advised uptitration of sertraline (25mg x3d then increase to 50mg daily beginning 8/20) and Remeron stopped. Also advised to liberalize Ativan to 0.5mg q6h prn.    * Seen by psych on 8/24, Abilify increased from 5mg to 10mg.  * PRN ativan stopped on 08/27/22 as leading to increased confusion. Hydroxyzine stopped  as well.   -- cont current regimen of meds including Abilify 5mg  every evening, Zyprexa 2.5mg HS and sertraline 50mg daily  -- cont prns per psych including Zyprexa 5mg q6h prn  --Giving one-time dose Ativan on 9/17 did for increased anxiety: Patient is crying and calling out frequently through the day.     Urinary retention: Resolved  * Retaining urine on 8/14. UA WNL, not worrisome for infection  * Requiring straight cath x1 on 8/20 PM  * Good UOP, no longer requiring straight cath. No evidence of obstruction on PVR.      Dysphagia due to CVA: Resolved  * Seen by SLP and noted with dysphagia. Initially required some intermittent fluid boluses.   * Was eventually able to advance to regular diet w/thin liquids     Dyslipidemia  * FLP this stay showed tot cholest 163, HDL 47, LDL 91, .   * Started on atorvastatin 40 mg daily     Volume overload dt diastolic CHF exacerbation: Improved  Essential hypertension  Hypokalemia, recurrent on lasix, and often refusing potassium   * Not on/needing meds PTA.   * As above, echo this stay showed EF 50% with grade I diastolic dysfunction; RV not well visualized but appeared mild-moderately dilated with global systolic function probably mildly reduced.   * BPs were initially soft this stay and required IVFs. BPs improved.   * Developed pedal edema required IV Lasix. Ultimately transition to oral Lasix.  * Started on lisinopril this stay  * BPs improved during stay.   * Lasix decreased from 40mg in AM and 20mg in PM to 20mg BID on 9/3 and added spironolactone 25mg daily    -- reduced lisinopril dose to 5mg daily with parameters on 9/14, will hold lisinopril completely for now as she needs diuretics  -- cont Lasix 20mg po BID  -- reduced spironolactone to 12.5mg daily with parameters on 9/14  -- cont KCl 20mEq po daily  -- f/u bp and adjust closely as indicated     Hypophosphatemia: Resolved     Prolonged QTc   * EKG on 7/13 showed QTc 494.   * Repeat EKG on 7/30 (while on Levaquin) showed QTc table at 475. Has since completed course of abx  as below.  * Had been monitoring on telemetry this stay. No concerning findings so tele was ultimately dc'd 8/19     Chronic bilateral LE edema with chronic venous stasis dermatitis and chronic skin changes  Hx of LLE cellulitis  * Was hospitalized in 11/2021 for sepsis dt to pneumonia and LLE cellulitis.   * During this stay, BLE noted to be patty and edematous, no unilateral leg swelling appreciated; LLE had patchy areas of erythema, no fluctuance, no painful areas with palpation; legs warm to touch. Lymphedema consulted.   -- cont LE elevation, lymphedema wraps     Lower back/R flank wound/abscess, suspect dt pressure type injury, s/p surgical debridement on 7/30/22 and placement of wound vac on 8/2/22,  OA  Hx of bilateral hip replacements   Hx of chronic neck and back pain  * Pt with significant back pain early on in hospital stay. Was requiring IV hydromorphone.  Dose had to be decreased dt somnolence.  * MRI L-spine in 2018 showed multilevel degenerative changes with mild central stenosis. Patient has chronic back pain, reports having it over the last 2 years.   * During this stay, patient was noted with 5-6cm by 2-3cm wound with swelling/fluid/blistering in a fold of her lower back, did not appear infected; this seemed to be the source of her pain. Padded dressing placed and WOC RN ordered. Pain initially improved.   * Was placed on course of Augmentin on 7/24.   * On 7/26, was re-evaluated by WOC RN. Wound appeared more erythematous and purulence expressed. Worsening with shear stress from lift. Procal <0.05, WBC WNL. Abx changed to IV clindamycin.  * Wound cultures obtained. On 7/28, wound grew proteus and staph aureus (MRSA neg). US neg for abscess.   * Lost IV access on 7/28 so abx were changed to oral clindamycin and oral levaquin. IV access re-established but was continued on oral abx.  * General surgery consulted, ultimately underwent excisional debridement of R flank abscess in OR on 7/30. Intraop  cultures showed polymicrobial growth with proteus mirabilis x2 strains (both pan-sensitive), corynebacterium striatum and enterococcus faecalis.  * Wound vac placed per general surgery on 8/2 >> subsequently removed during stay.  * ID consulted on 8/2 and abx narrowed to Augmentin alone, completed an additional 5 days of treatment on 8/7.      -- wound per WOC RN, follows weekly  -- prns available for pain  -- cont regular repositioning     Pressure Injury, left Medial thigh/groin  Pressure Injury Stage: Deep Tissue Pressure Injury (DTPI), hospital acquired, now HAPI stage 2 pressure injury device related purwick external catheter tubing (not the purwick device itself)  - Wound care following    Pressure injury bilateral buttocks  * Noted by nursing on 09/02/22. WOC RN following as above.      Suspected meningioma left parietal region, incidentally seen on admission CT on 7/13  * Head CT 7/13 showed a calcified extra-axial mass overlying the left parietal region measuring 1.4 cm, potentially representing a meningioma.  * Will need serial monitoring OP after discharge.      Anemia, suspect dilutional component: Resolved  * Hgb normal on admit. Hgb 11.5 on 7/16. No overt clinical signs of major bleeding.  * Hgb now stable at 12-13      ntertriginous dermatitis  * Chronic and stable, cont clotrimazole powder     Constipation  * Continue sched bowel regimen     Morbid obesity  * BMI 59 on admission. Recommend aggressive dietary and lifestyle modifications as condition improves     Suspected sleep apnea  * O2 drop to mid 80s overnight 8/14. Briefly placed on 4LPM, which she then removed and then slept okay with sats in 90s thereafter.  * Recommend sleep study as outpatient       COVID-19 vaccination status  * Received first dose of vaccine on 8/28, 2nd dose due 9/18/22       Severe malnutrition. In Context of:  Acute illness or injury  Chronic illness or disease  Malnutrition: (8/31)   % Weight Loss:  > 5% in 1 month  (severe malnutrition) - significant loss over this admit  % Intake:  </= 50% for >/= 5 days (severe malnutrition) - consistent this admit x49 days  Subcutaneous Fat Loss:  Orbital region moderate depletion - visual, per 8/16 note  Muscle Loss:  Temporal region moderate depletion - visual, per 8/16 note  Fluid Retention:  Mild generalized edema  - dietitian following, recommend alternative feeding source? Not clear she would tolerate a tube feed with her confusion, will monitor, perhaps consider some carb counting          Diet: Advance Diet as Tolerated  Regular Diet Adult Thin Liquids (level 0) (Upright position, alert, and assist as needed)  Room Service    DVT Prophylaxis: Enoxaparin (Lovenox) SQ  [place] Catheter: Not present  Central Lines: None  Cardiac Monitoring: None  Code Status: Full Code      Disposition Plan      Expected Discharge Date: 09/23/2022    Discharge Delays: Placement - LTC  Covid Vaccine for Placement  *Medically Ready for Discharge    Discharge Comments: . Writer resent referrals to [facility], [facility], [facility], [facility] and [facility]. Writer also sent a referral to matheus Hua and . Will get 2nd dose of covid vaccine later this month.        The patient's care was discussed with the Patient.    Hamilton Higginbotham DO  Hospitalist Service  [facility]  Securely message with the Vocera Web Console (learn more here)  Text page via [employer] Paging/Directory         Clinically Significant Risk Factors Present on Admission                      ______________________________________________________________________    Interval History   Stable overnight.  No acute complaints    Data reviewed today: I reviewed all medications, new labs and imaging results over the last 24 hours. I personally reviewed no images or EKG's today.    Physical Exam   Vital Signs: Temp: 97.2  F (36.2  C) Temp src: Oral BP: 90/66 Pulse: 94   Resp: 18  SpO2: 95 % O2 Device: None (Room air)    Weight: 283 lbs 0 oz  General Appearance: Obese , no distress, alert and conversant  Respiratory: Lungs clear  Cardiovascular: RRR  GI: Nontender, nondistended  Skin: No rash  Extremities: No edema, moving all extremities      Data   Recent Labs   Lab 09/21/22  0833 09/20/22  0916 09/19/22  1458 09/19/22  0907 09/18/22  1436 09/17/22  2032 09/17/22  1241 09/16/22  0809 09/15/22  1050   PLT  --   --   --   --  274  --   --   --  282   NA  --  138  --  139  --   --   --   --   --    POTASSIUM 3.5 3.6 4.0 3.3*  --    < >  --    < >  --    CHLORIDE  --  104  --  103  --   --   --   --   --    CO2  --  27  --  30  --   --   --   --   --    BUN  --  17  --  17  --   --   --   --   --    CR  --  0.68  --  0.69  --   --   --   --   --    ANIONGAP  --  7  --  6  --   --   --   --   --    ASHIA  --  9.1  --  8.9  --   --   --   --   --    GLC  --  106*  --  107*  --   --  108*   < >  --     < > = values in this interval not displayed.     No results found for this or any previous visit (from the past 24 hour(s)).  Medications     - MEDICATION INSTRUCTIONS -         ARIPiprazole  10 mg Oral QPM     aspirin  325 mg Oral Daily     atorvastatin  40 mg Oral QPM     enoxaparin ANTICOAGULANT  40 mg Subcutaneous Q12H     furosemide  20 mg Oral BID     [Held by provider] lisinopril  5 mg Oral Daily     miconazole   Topical BID     multivitamins w/minerals  15 mL Oral Daily     OLANZapine  2.5 mg Oral At Bedtime     polyethylene glycol  17 g Oral Daily     potassium chloride  20 mEq Oral Daily     senna-docusate  1-2 tablet Oral BID     sertraline  50 mg Oral Daily     spironolactone  12.5 mg Oral Daily

## 2022-09-21 NOTE — PLAN OF CARE
Goal Outcome Evaluation:    Summary: CVA R MCA  DATE & TIME:  9/21/22 0701-9507          Cognitive Concerns/ Orientation : A&O x 2, disoriented to time and situation. No insight to situation. Tearful at times  BEHAVIOR & AGGRESSION TOOL COLOR: Green  ABNL VS/O2: VSS, on RA  MOBILITY: Total, up with lift, chair X 1. Turned and repositioned Q2 hours  PAIN MANAGMENT: denied pain  DIET: Regular - very poor intake, needs a lot of encouragement to eat.   BOWEL/BLADDER: Incontinent at times purewick in place, improved UOP this shift. Encouraged fluids. Had BM/small  ABNL LAB/BG: K+ 3.5 Mg 2.0  DRAIN/DEVICES: None  TELEMETRY RHYTHM: NA  SKIN: BLE patty, flaky with +2 edema. Elevated on pillows. Edema wear in place. Dressing on R flank CDI, bath complete.  TESTS/PROCEDURES: NA  D/C DATE: Pending placement  Discharge Barriers: LTC placement  OTHER IMPORTANT INFO: WOC and SW following.

## 2022-09-21 NOTE — PLAN OF CARE
Goal Outcome Evaluation:    Plan of Care Reviewed With: patient     Overall Patient Progress: no change    Outcome Evaluation: Continues to eat poorly. noted multivitamin changed to liquid? still refusing. 0-25% for most meals. Suggested cheese w/ her saltine crackers -she liked this idea.

## 2022-09-21 NOTE — PROGRESS NOTES
"CLINICAL NUTRITION SERVICES - REASSESSMENT NOTE    RECOMMENDATIONS FOR MD/PROVIDER TO ORDER:   - Nutrition Support.    Future/Additional Recommendations:   - continue room service assistance  - continue encouragement of meals and micronutrients   Malnutrition:   9/21  % Weight Loss:  > 5% in 1 month (severe malnutrition) - significant loss over this admit  % Intake:  </= 50% for >/= 5 days (severe malnutrition) - consistent this admit x70 days  Subcutaneous Fat Loss:  Orbital region moderate depletion - visual, per 8/16 note  Muscle Loss:  Temporal region moderate depletion - visual, per 8/16 note  Fluid Retention:  Mild generalized edema     Malnutrition Diagnosis: Severe malnutrition  In Context of:  Acute illness or injury  Chronic illness or disease     EVALUATION OF PROGRESS TOWARD GOALS   Diet:   Regular diet   Thin liquids   Room service w/ assist     Intake/Tolerance:   - Intakes remain low. Eating 0-25% of meals most often. Occasionally will have up to 50%.   - Pt seen in room. She was taking small bites of crackers - said she probably would only be able to eat 1 before getting too full. States \"I try.. I eat little by little but eventually I have to stop\".     - Labs reviewed  - Weight: Weight is down to 128.4 kg today - new lowest wt of admission. Down significantly over the past 70 days of admission. R/t diuresis and poor PO to some degree.   - Stooling: Bm x3 on 9/19    - Meds: Lasix. Thera vit M was discontinued and transitioned to a liquid multivitamin by MD on 9/19 - pt is refusing.   Miralax/Senokot    ASSESSED NUTRITION NEEDS:  Dosing Weight: 89.7 kg (adjusted)  Estimated Energy Needs: 6743-7395 kcals (15-20 Kcal/Kg)  Justification: maintenance r/t BMI  Estimated Protein Needs: 108-135 grams protein (1.2-1.5 g pro/Kg)  Justification: wound healing, obesity guidelines, preservation of lean body mass and increased needs r/t age  Estimated Fluid Needs: (1 mL/Kcal)  Justification: maintenance and per " provider pending fluid status    Previous Goals:   Patient to consume >50% of nutritionally adequate meals BID-TID over next 5-7 days vs start nutrition support.   Evaluation: Not met    Previous Nutrition Diagnosis:   Inadequate oral intake related to poor appetite, disinterest in eating, and increased protein needs for wound healing as evidenced by 0-50% of meals consistently over 63 day admission  Evaluation: No change    CURRENT NUTRITION DIAGNOSIS  Inadequate oral intake related to poor appetite, disinterest in eating, and increased protein needs for wound healing as evidenced by 0-50% of meals consistently over 70 day admission    INTERVENTIONS  Recommendations / Nutrition Prescription  - Nutrition Support.     - continue room service assistance  - continue encouragement of meals and micronutrients    Implementation  None new. Provided suggestions for meals. Cheese/crackers    Goals  Intake of at least 50% meals BID.       MONITORING AND EVALUATION:  Progress towards goals will be monitored and evaluated per protocol and Practice Guidelines    Nicole Mitchell RD, LD  [facility], [facility], [facility]  Pager: [phone]  Weekend Pager: [phone]

## 2022-09-22 NOTE — PLAN OF CARE
Goal Outcome Evaluation:  Summary: CVA R MCA  DATE & TIME:  9/22/22 9102-8840   Cognitive Concerns/ Orientation : A&O x 2, disoriented to time and situation. No insight to situation. Tearful at times. Frequent emotional support provided.  BEHAVIOR & AGGRESSION TOOL COLOR: Green  ABNL VS/O2: VSS on RA  MOBILITY: Total cares, lift for transfer, T&R Q2h (refuses at times). Pulsate mattress, BLE elevated on pillows. Does not like pillows, frequent education on skin breakdown  PAIN MANAGEMENT: Complaining of pain to feet/oxy given X1  DIET: Regular, no food intake this shift despite encouragement is drinking water/pop  BOWEL/BLADDER: Incontinent, purewick in use/urine tea color, explained that she needs to take in more fluids.   ABNL LAB/BG: None  DRAIN/DEVICES: None  SKIN: BLE patty, flaky with +2 edema. Edema wear in place. Wound cares completed per POC/WOC following weekly  D/C DATE: Pending placement  Discharge Barriers: LTC placement, SW following  OTHER IMPORTANT INFO:

## 2022-09-22 NOTE — PROGRESS NOTES
[facility]    Medicine Progress Note - Hospitalist Service    Date of Admission:  7/13/2022    Assessment & Plan        Cristy Bailey is a 77 year old female with a PMHx significant for morbid obesity, hypertension, and hyperlipidemia, who presented to [facility] 7/13/2022 with complete left-sided paralysis, left-sided facial droop, and difficulty speaking. CTA head remarkable for right MCA occlusion. She was given tenecteplase and subsequently transferred to [facility] 7/13/2022 for thrombectomy.      Hospital stay complicated by flank wound which required surgical debridement and placement of wound vac and findings LTC placement.     Acute R MCA ischemic stroke with edema and right to left midline shift  S/p tenecteplase and subsequent R MCA mechanical thrombectomy 7/13/22  * Presented to [facility] 7/13 with complete paralysis, left-sided facial droop, and aphasia. Code stroke initiated. Head CT 7/13 showed signs of an evolving R MCA infarct. CTA head 7/13 showed a right carotid terminus occlusion, right middle cerebral artery M1  segment occlusion with poor opacification of the more distal right MCA branches/poor collateral flow. CTA neck 7/13 negative for acute occlusions. Pt given tenecteplase 7/13 at 13:11. Noted to be agitated and was subsequently intubated given need for procedure.   * Subsequently transferred to [facility] 7/13/2022 where she underwent above procedure.   7/14: Extubated. Head CT 7/14 showed evolution of acute infarct involving the R MCA distribution with increased swelling, cortical effacement, and new mild right-to-left midline shift; questionable hypoattenuation involving the bilateral occipital lobes which could be artifactual.   * Additional stroke workup pursued this stay -- echo 7/14 showed EF 50%, grade 1 diastolic dysfunction   * Started ASA and atorvastatin per stroke team.   * Repeat head CT 7/15 showed evolving R MCA stroke with areas of  edema, overall stable.   plan  -- conts on full dose ASA and statin  -- goal SBP <140/90 and met   -- monitored on telemetry for first several wks of hospital stay without abnl rhythm so can likely defer prior recommendations for 30d cardiac event monitor at discharge  -- will need to follow up with [facility] in 6-8 wks (due beginning of September)  Waiting on placement    Severe malnutrition in context of acute illness and chronic disease  * Nutritionist following. Appetite poor this stay, needing encouragement to take po.   *there is some discussion about needing feeding support, but patient does not want to have a feeding tube.  Plan  - encourage oral intake  - patient does not want a feeding tube at this point. She is intermittently confused with poor insight, but would avoid invasive management for now    Anxiety   Cognitive impairment, confusion as above.   * As stay has progressed, patient has consistently been oriented x2 (to self, location), unable to state the year. Also noted to have intermittent situational confusion. States family members have been visiting when they have not.   * Ongoing issues with anxiety this stay -- had been on regimen of Seroquel 12.5mg at dinner and 25mg HS with 12.5mg po q6h prn available. Mirtazapine 7.5mg on 8/15 evening given decreased appetite  * Psych consulted for assistance with ongoing mgmt -- seen on 8/16 and meds adjusted. Seroquel HS changed to Abilify 5mg HS. Recommended Zyptexa 5mg q6h prn for breakthrough insomnia/agitation while going off Seroquel. Consider uptitration of Abilify per psych recs. Advised uptitration of sertraline (25mg x3d then increase to 50mg daily beginning 8/20) and Remeron stopped. Also advised to liberalize Ativan to 0.5mg q6h prn.    * Seen by psych on 8/24, Abilify increased from 5mg to 10mg.  * PRN ativan stopped on 08/27/22 as leading to increased confusion. Hydroxyzine stopped  as well.   -- cont current regimen of meds including Abilify 5mg  every evening, Zyprexa 2.5mg HS and sertraline 50mg daily  -- cont prns per psych including Zyprexa 5mg q6h prn  -- Patient is crying and calling out frequently through the day.    Goals of care, failure to thrive  Discussed with patient and significant other/Health care agent Leo. She remains confused. He reiterated her wishes for DNR/DNI and no feeding tubes   We discussed concern about overall prognosis, with her eating 0-25% of her meals and nutrition recommending alternative forms of nutritions  She has lost 70 lbs since admit (some of that is probably related to diuresis and some related to variable weights in hospital, but still significant)  Overall, placement in a facility has been difficult, and there is concern about her continued deteriorating under these circumstances  Plan  - changed code status to DNR/DNI. This is also consistent with her previous POLST, which Leo states is on the fridge at home  - consult palliative, ideally to talk to Leo and Cristy, whose confusion limits in depth discussion but tends to be better in the AM     Urinary retention: Resolved  * Retaining urine on 8/14. UA WNL, not worrisome for infection  * Requiring straight cath x1 on 8/20 PM  * Good UOP, no longer requiring straight cath. No evidence of obstruction on PVR.      Dysphagia due to CVA: Resolved  * Seen by SLP and noted with dysphagia. Initially required some intermittent fluid boluses.   * Was eventually able to advance to regular diet w/thin liquids     Dyslipidemia  * FLP this stay showed tot cholest 163, HDL 47, LDL 91, .   * Started on atorvastatin 40 mg daily     Volume overload dt diastolic CHF exacerbation: Improved  Essential hypertension  Hypokalemia, recurrent on lasix, and often refusing potassium   * Not on/needing meds PTA.   * As above, echo this stay showed EF 50% with grade I diastolic dysfunction; RV not well visualized but appeared mild-moderately dilated with global systolic function  probably mildly reduced.   * BPs were initially soft this stay and required IVFs. BPs improved.   * Developed pedal edema required IV Lasix. Ultimately transition to oral Lasix.  * Started on lisinopril this stay  * BPs improved during stay.   * Lasix decreased from 40mg in AM and 20mg in PM to 20mg BID on 9/3 and added spironolactone 25mg daily    -- reduced lisinopril dose to 5mg daily with parameters on 9/14, will hold lisinopril completely for now as she needs diuretics  -- cont Lasix 20mg po BID  -- reduced spironolactone to 12.5mg daily with parameters on 9/14  -- cont KCl 20mEq po daily  -- f/u bp and adjust closely as indicated     Hypophosphatemia: Resolved     Prolonged QTc   * EKG on 7/13 showed QTc 494.   * Repeat EKG on 7/30 (while on Levaquin) showed QTc table at 475. Has since completed course of abx as below.  * Had been monitoring on telemetry this stay. No concerning findings so tele was ultimately dc'd 8/19     Chronic bilateral LE edema with chronic venous stasis dermatitis and chronic skin changes  Hx of LLE cellulitis  * Was hospitalized in 11/2021 for sepsis dt to pneumonia and LLE cellulitis.   * During this stay, BLE noted to be patty and edematous, no unilateral leg swelling appreciated; LLE had patchy areas of erythema, no fluctuance, no painful areas with palpation; legs warm to touch. Lymphedema consulted.   -- cont LE elevation, lymphedema wraps     Lower back/R flank wound/abscess, suspect dt pressure type injury, s/p surgical debridement on 7/30/22 and placement of wound vac on 8/2/22,  OA  Hx of bilateral hip replacements   Hx of chronic neck and back pain  * Pt with significant back pain early on in hospital stay. Was requiring IV hydromorphone.  Dose had to be decreased dt somnolence.  * MRI L-spine in 2018 showed multilevel degenerative changes with mild central stenosis. Patient has chronic back pain, reports having it over the last 2 years.   * During this stay, patient was  noted with 5-6cm by 2-3cm wound with swelling/fluid/blistering in a fold of her lower back, did not appear infected; this seemed to be the source of her pain. Padded dressing placed and WOC RN ordered. Pain initially improved.   * Was placed on course of Augmentin on 7/24.   * On 7/26, was re-evaluated by WOC RN. Wound appeared more erythematous and purulence expressed. Worsening with shear stress from lift. Procal <0.05, WBC WNL. Abx changed to IV clindamycin.  * Wound cultures obtained. On 7/28, wound grew proteus and staph aureus (MRSA neg). US neg for abscess.   * Lost IV access on 7/28 so abx were changed to oral clindamycin and oral levaquin. IV access re-established but was continued on oral abx.  * General surgery consulted, ultimately underwent excisional debridement of R flank abscess in OR on 7/30. Intraop cultures showed polymicrobial growth with proteus mirabilis x2 strains (both pan-sensitive), corynebacterium striatum and enterococcus faecalis.  * Wound vac placed per general surgery on 8/2 >> subsequently removed during stay.  * ID consulted on 8/2 and abx narrowed to Augmentin alone, completed an additional 5 days of treatment on 8/7.      -- wound per WOC RN, follows weekly  -- prns available for pain  -- cont regular repositioning     Pressure Injury, left Medial thigh/groin  Pressure Injury Stage: Deep Tissue Pressure Injury (DTPI), hospital acquired, now HAPI stage 2 pressure injury device related purwick external catheter tubing (not the purwick device itself)  - Wound care following    Pressure injury bilateral buttocks  * Noted by nursing on 09/02/22. WOC RN following as above.      Suspected meningioma left parietal region, incidentally seen on admission CT on 7/13  * Head CT 7/13 showed a calcified extra-axial mass overlying the left parietal region measuring 1.4 cm, potentially representing a meningioma.  * Will need serial monitoring OP after discharge.      Anemia, suspect dilutional  component: Resolved  * Hgb normal on admit. Hgb 11.5 on 7/16. No overt clinical signs of major bleeding.  * Hgb now stable at 12-13      ntertriginous dermatitis  * Chronic and stable, cont clotrimazole powder     Constipation  * Continue sched bowel regimen     Morbid obesity  * BMI 59 on admission. Recommend aggressive dietary and lifestyle modifications as condition improves     Suspected sleep apnea  * O2 drop to mid 80s overnight 8/14. Briefly placed on 4LPM, which she then removed and then slept okay with sats in 90s thereafter.  * Recommend sleep study as outpatient       COVID-19 vaccination status  * Received first dose of vaccine on 8/28, 2nd dose due 9/18/22       Severe malnutrition. In Context of:  Acute illness or injury  Chronic illness or disease  Malnutrition: (8/31)   % Weight Loss:  > 5% in 1 month (severe malnutrition) - significant loss over this admit  % Intake:  </= 50% for >/= 5 days (severe malnutrition) - consistent this admit x49 days  Subcutaneous Fat Loss:  Orbital region moderate depletion - visual, per 8/16 note  Muscle Loss:  Temporal region moderate depletion - visual, per 8/16 note  Fluid Retention:  Mild generalized edema  - dietitian following, recommend alternative feeding source? Not clear she would tolerate a tube feed with her confusion, will monitor, perhaps consider some carb counting          Diet: Advance Diet as Tolerated  Regular Diet Adult Thin Liquids (level 0) (Upright position, alert, and assist as needed)  Room Service    DVT Prophylaxis: Enoxaparin (Lovenox) SQ  [place] Catheter: Not present  Central Lines: None  Cardiac Monitoring: None  Code Status: No CPR- Do NOT Intubate      Disposition Plan     Expected Discharge Date: 09/23/2022    Discharge Delays: Placement - LTC  Covid Vaccine for Placement  *Medically Ready for Discharge    Discharge Comments: . Writer resent referrals to [facility], [facility], [facility], [facility] and [facility]  [place]. Writer also sent a referral to matheus Hua and . Will get 2nd dose of covid vaccine later this month.        The patient's care was discussed with the Patient.    Hamilton Higginbotham DO  Hospitalist Service  [facility]  Securely message with the Vocera Web Console (learn more here)  Text page via [employer] Paging/Directory         Clinically Significant Risk Factors Present on Admission                      ______________________________________________________________________    Interval History   Stable overnight.  No acute complaints. Somewhat confused. Discussed with SO and health care agent dulce maria in depth    Data reviewed today: I reviewed all medications, new labs and imaging results over the last 24 hours. I personally reviewed no images or EKG's today.    Physical Exam   Vital Signs: Temp: 97.2  F (36.2  C) Temp src: Oral BP: 104/61 Pulse: 92   Resp: 18 SpO2: 93 % O2 Device: None (Room air)    Weight: 283 lbs 0 oz  General Appearance: Obese , no distress, alert and conversant  Respiratory: Lungs clear  Cardiovascular: RRR  GI: Nontender, nondistended  Skin: No rash  Extremities: No edema, moving all extremities      Data   Recent Labs   Lab 09/21/22  1629 09/21/22  0833 09/20/22  0916 09/19/22  1458 09/19/22  0907 09/18/22  1436 09/17/22  2032 09/17/22  1241 09/16/22  0809 09/15/22  1050     --   --   --   --  274  --   --   --  282   NA  --   --  138  --  139  --   --   --   --   --    POTASSIUM  --  3.5 3.6 4.0 3.3*  --    < >  --    < >  --    CHLORIDE  --   --  104  --  103  --   --   --   --   --    CO2  --   --  27  --  30  --   --   --   --   --    BUN  --   --  17  --  17  --   --   --   --   --    CR  --   --  0.68  --  0.69  --   --   --   --   --    ANIONGAP  --   --  7  --  6  --   --   --   --   --    ASHIA  --   --  9.1  --  8.9  --   --   --   --   --    GLC  --   --  106*  --  107*  --   --  108*   < >  --     < > = values in this interval  not displayed.     No results found for this or any previous visit (from the past 24 hour(s)).  Medications     - MEDICATION INSTRUCTIONS -         ARIPiprazole  10 mg Oral QPM     aspirin  325 mg Oral Daily     atorvastatin  40 mg Oral QPM     enoxaparin ANTICOAGULANT  40 mg Subcutaneous Q12H     furosemide  20 mg Oral BID     [Held by provider] lisinopril  5 mg Oral Daily     miconazole   Topical BID     multivitamins w/minerals  15 mL Oral Daily     OLANZapine  2.5 mg Oral At Bedtime     polyethylene glycol  17 g Oral Daily     potassium chloride  20 mEq Oral Daily     senna-docusate  1-2 tablet Oral BID     sertraline  50 mg Oral Daily     spironolactone  12.5 mg Oral Daily

## 2022-09-22 NOTE — PROGRESS NOTES
Palliative consult received for goals of care. Due to limited palliative staffing today and consult volume received in the last 24hrs, pt will be evaluated on Friday 9/23. Dr. Higginbotham notified. Thanks.    ISRAEL [facility]  Contact information available via [facility] Paging/Directory

## 2022-09-22 NOTE — PLAN OF CARE
Goal Outcome Evaluation:    Plan of Care Reviewed With: patient     Overall Patient Progress: no change    DATE & TIME:  9/21/22 [id number]       Cognitive Concerns/ Orientation : A&O x 2, disoriented to time and situation. No insight to situation. Tearful at times. Frequent emotional support provided.  BEHAVIOR & AGGRESSION TOOL COLOR: Green  ABNL VS/O2: VSS on RA  MOBILITY: Total cares, lift for transfer, T&R Q2h (refuses at times). Pulsate mattress, BLE elevated on pillows. Does not like pillows, however provided frequent education on skin breakdown  PAIN MANAGMENT: Pt yelling out in 10/10 L foot/toe pain during the evening. Assessed, appears WNL. Heat applied and repositioned without relief. PRN 2.5mg Oxy given with relief. Denies pain overnight and in AM  DIET: Regular, no intake this shift despite encouragement   BOWEL/BLADDER: Incontinent, purewick in use. Attempted to have BM on BSC. No brief in bed  ABNL LAB/BG: None  DRAIN/DEVICES: None  SKIN: BLE patty, flaky with +2 edema. Edema wear in place. See chart and WOC notes for wounds. Wound cares completed per POC. WOC following weekly  D/C DATE: Pending placement  Discharge Barriers: LTC placement, SW following  OTHER IMPORTANT INFO:

## 2022-09-23 NOTE — PROGRESS NOTES
[facility] Nurse Inpatient Assessment     Consulted for: Right lower back wound (Stage 3 HAPI) and Left medial thigh wound (stage 2)    Areas Assessed:      Areas visualized during today's visit: right skin fold, left medial thigh      Wound location: Right lower back in waist crease    9/13 9/22        Photo date: 9/22  Wound due to: Hospital Acquired Pressure injury stage 3   Stage: Unstageable 7/28, Following surgical debridement 7/30 Stage 3 Pressure Injury and Moisture Associated Skin Damage (MASD), suspect intertriginous pressure, appears to be deeper tissue damage within a crease, possible shear component from lift.    Wound history/plan of care:  Hypergranulation persists with small area of slough at lower edge. Will switch to hydrofera blue trial.   Wound base:90 % red moist non granular tissue, 5% darker deep red/bleeding tissue, 5% yellow adherent slough     Palpation of the wound bed: normal       Drainage: moderate      Description of drainage: serosanguinous      Measurements (length x width x depth, in cm) 2 x 14.5 x 0.3cm      Tunneling N/A      Undermining NA  Periwound skin: necrotic tissue lifting, only one small (less than 0.5cm x 1cm) at Left lower edge of wound. Pustule above wound resolved now quarter sized pink erythema area      Color: pink      Temperature: normal to warm and sweaty  Odor: none    Pain: none   Pain intervention prior to dressing change: none needed   Treatment goal: Heal   STATUS: improving   Supplies ordered: at bedside     Pressure Injury Location: Left Medial thigh/groin  Last photo: 9/16      Wound type: Pressure Injury     Pressure Injury Stage: Deep Tissue Pressure Injury (DTPI), hospital acquired, now HAPI stage 2 pressure injury device related purwick external catheter tubing (not the purwick device itself)   Wound history/plan of care:   Pt has had long (day 62 today 9-13-22) hospital course and unable to get up to the bathroom.  "Pt complaint of heavy legs and difficulty with moving independently.     Wound base: 100% pink moist tissue     Palpation of the wound bed: normal      Drainage: none     Description of drainage: none     Measurements (length x width x depth, in cm) now 2 separate area medial 0.4 x 1 x 0.1 and distal 0.5 x 1.4 x 0.1cm     Tunneling N/A     Undermining N/A  Periwound skin: Intact      Color: normal and consistent with surrounding tissue      Temperature: normal   Odor: none  Pain: moderate, tender  Pain intervention prior to dressing change: N/A  Treatment goal: Heal   STATUS: healing  Supplies ordered: at bedside and supplies stored on unit     Wound location: Left breast    Last photo: 9/16/22        Wound due to: Moisture Associated Skin Damage (MASD)  Wound history/plan of care: moisture split, deep crease with moisture trapping  Wound base: 100 % epidermis     Palpation of the wound bed: normal      Drainage: none     Description of drainage: none     Measurements (length x width x depth, in cm): 0.3  x 4.0  x  0.1 cm      Tunneling: N/A     Undermining: N/A  Periwound skin: Intact and Erythema- blanchable      Color: pink      Temperature: normal   Odor: none  Pain: denies , none  Pain interventions prior to dressing change: N/A  Treatment goal: Heal  and Decrease moisture  STATUS: healing  Supplies ordered: at bedside      Treatment Plan:     Posterior waist crease: Daily  1. Remove dressings and discard. Wet Aquacel AG to ease removal if dry still.   2. Cleanse wound and periwound skin with Vashe ( #[id number]) solution and 4x4\" gauze, pat dry   3. Apply 3M No Sting barrier wipe to periwound skin, let dry   4. Cut piece Aquacel AG to size of wound and apply to wound bed.  5. Cover with Mepilex  6. Time/Date/Initial dressing change         Left Inner thigh/groin/Left breast: Every other day and PRN  1. Clean wound with saline or MicroKlenz Spray, pat dry  2. Wipe / \"clean\" the surrounding periwound tissue with " skin prep (Cavilon No Sting Skin Prep #711703) and allow to dry. This will help protect periwound and help dressing adherence  3. Apply Triad paste to wound bed until tissue closes, then stop Triad and switch to InterDry AG. Don't use Triad and Interdry together as the paste can prevent InterDry from working.   to the area, making sure to conform nicely to skin curvatures.   4. Time and date dressing change  NOTE  -Reposition pt side to side ever when in bed, every 2 hours-get the pt way over on side to completely offload pressure. This will benefit skin and respiratory function   -Keep heels elevated and floating on pillows at all times. Try using at least 2 pillows under each calf.  -When up to the chair pt needs to fully offload every 2 hours and use a chair cushion if needed       Orders: Updated    RECOMMEND PRIMARY TEAM ORDER: None, at this time  Education provided: importance of repositioning, plan of care, wound progress, Moisture management and Off-loading pressure  Discussed plan of care with: Patient and Nurse  WOC nurse follow-up plan: weekly  Notify WOC if wound(s) deteriorate.  Nursing to notify the Provider(s) and re-consult the WOC Nurse if new skin concern.    DATA:     Current support surface: Bariatric Low air loss mattress    Containment of urine/stool: Incontinence Protocol and Incontinent pad in bed  BMI: Body mass index is 45.68 kg/m .   Active diet order: Orders Placed This Encounter      Advance Diet as Tolerated      Regular Diet Adult Thin Liquids (level 0) (Upright position, alert, and assist as needed)     Output: I/O last 3 completed shifts:  In: 340 [P.O.:340]  Out: 150 [Urine:150]     Labs:   No lab results found in last 7 days.    Invalid input(s): GLUCOMBO  Pressure injury risk assessment:   Sensory Perception: 4-->no impairment  Moisture: 3-->occasionally moist  Activity: 2-->chairfast  Mobility: 2-->very limited  Nutrition: 1-->very poor  Friction and Shear: 2-->potential  problem  Demetrio Score: 14    Telly Meyer RN [facility]   Dept. Pager: [phone]  Dept. Office Number: [phone]

## 2022-09-23 NOTE — PLAN OF CARE
Summary: CVA R MCA  DATE & TIME:  9/23/22 AM shift  Cognitive Concerns/ Orientation : A&O x 2, disoriented to time and situation. Pt has very poor insight on her clinical situation. Voices wanting to be discharged home. BEHAVIOR & AGGRESSION TOOL COLOR: Green, anxious at times. Feels better when emotional support provided.   ABNL VS/O2: VSS on RA  MOBILITY: Total cares, lift for transfer, T&R Q2h (favors left side). Pulsate mattress, BLE elevated on pillows. Kicks pillows out underneath her legs/feet, pt educated on importance of elevated rickie LE. Up to the chair this morning.  PAIN MANAGEMENT: c/o of lower back pain, repositioned for comfort.   DIET: Regular, very poor intake. Had maybe half a cup of water this morning with pills. And two spoons of pudding with potassium, however did spit out some of the pudding out. Encouraged to take sips of water, pt needs reminded. Will take only sip at time. Banana ordered for low K, ate half of it.   BOWEL/BLADDER: Incontinent of B&B.  Purewick in use, no urine output this shift. Pt was educated on importance of drinking fluids. Bladder scanned for 108 ml. One small soft BM this shift.   ABNL LAB/BG: K 3.4 replaced, recheck scheduled for 2:30 pm.   DRAIN/DEVICES: None  SKIN: BLE patty, flaky with +2 edema. Edema wear in place. Wound cares completed per WOC RN recommendations.   D/C DATE: Pending, palliative consulted. Unable to see pt today, will plan for Monday.   Discharge Barriers: LTC placement, SW is following  OTHER IMPORTANT INFO: tried mixing K with apple juice, pt just took few sips. Pt also declined multivitamin.

## 2022-09-23 NOTE — PROGRESS NOTES
[facility]    Medicine Progress Note - Hospitalist Service    Date of Admission:  7/13/2022    Assessment & Plan        Cristy Bailey is a 77 year old female with a PMHx significant for morbid obesity, hypertension, and hyperlipidemia, who presented to [facility] 7/13/2022 with complete left-sided paralysis, left-sided facial droop, and difficulty speaking. CTA head remarkable for right MCA occlusion. She was given tenecteplase and subsequently transferred to [facility] 7/13/2022 for thrombectomy.      Hospital stay complicated by flank wound which required surgical debridement and placement of wound vac and findings LTC placement.     Acute R MCA ischemic stroke with edema and right to left midline shift  S/p tenecteplase and subsequent R MCA mechanical thrombectomy 7/13/22  * Presented to [facility] 7/13 with complete paralysis, left-sided facial droop, and aphasia. Code stroke initiated. Head CT 7/13 showed signs of an evolving R MCA infarct. CTA head 7/13 showed a right carotid terminus occlusion, right middle cerebral artery M1  segment occlusion with poor opacification of the more distal right MCA branches/poor collateral flow. CTA neck 7/13 negative for acute occlusions. Pt given tenecteplase 7/13 at 13:11. Noted to be agitated and was subsequently intubated given need for procedure.   * Subsequently transferred to [facility] 7/13/2022 where she underwent above procedure.   7/14: Extubated. Head CT 7/14 showed evolution of acute infarct involving the R MCA distribution with increased swelling, cortical effacement, and new mild right-to-left midline shift; questionable hypoattenuation involving the bilateral occipital lobes which could be artifactual.   * Additional stroke workup pursued this stay -- echo 7/14 showed EF 50%, grade 1 diastolic dysfunction   * Started ASA and atorvastatin per stroke team.   * Repeat head CT 7/15 showed evolving R MCA stroke with areas of  edema, overall stable.   plan  -- conts on full dose ASA and statin  -- goal SBP <140/90 and met   -- monitored on telemetry for first several wks of hospital stay without abnl rhythm so can likely defer prior recommendations for 30d cardiac event monitor at discharge  -- will need to follow up with [facility] in 6-8 wks (due beginning of September)  Waiting on placement    Severe malnutrition in context of acute illness and chronic disease  * Nutritionist following. Appetite poor this stay, needing encouragement to take po.   *there is some discussion about needing feeding support, but patient does not want to have a feeding tube.  * 70 lb weight loss in the last few months (likely lower in the context of diuresis for CHF and inherent error in measuring)  Plan  - encourage oral intake  - patient does not want a feeding tube at this point and her health care agent dulce maria agrees with this, consistent with previous wishes    Anxiety   Cognitive impairment, confusion as above.   * As stay has progressed, patient has consistently been oriented x2 (to self, location), unable to state the year. Also noted to have intermittent situational confusion. States family members have been visiting when they have not.   * Ongoing issues with anxiety this stay -- had been on regimen of Seroquel 12.5mg at dinner and 25mg HS with 12.5mg po q6h prn available. Mirtazapine 7.5mg on 8/15 evening given decreased appetite  * Psych consulted for assistance with ongoing mgmt -- seen on 8/16 and meds adjusted. Seroquel HS changed to Abilify 5mg HS. Recommended Zyptexa 5mg q6h prn for breakthrough insomnia/agitation while going off Seroquel. Consider uptitration of Abilify per psych recs. Advised uptitration of sertraline (25mg x3d then increase to 50mg daily beginning 8/20) and Remeron stopped. Also advised to liberalize Ativan to 0.5mg q6h prn.    * Seen by psych on 8/24, Abilify increased from 5mg to 10mg.  * PRN ativan stopped on 08/27/22 as  leading to increased confusion. Hydroxyzine stopped  as well.   -- cont current regimen of meds including Abilify 5mg every evening, Zyprexa 2.5mg HS and sertraline 50mg daily  -- cont prns per psych including Zyprexa 5mg q6h prn  -- Patient is crying and calling out frequently through the day.    Goals of care, failure to thrive  Discussed with patient and significant other/Health care agent Leo. She remains confused. He reiterated her wishes for DNR/DNI and no feeding tubes   We discussed concern about overall prognosis, with her eating 0-25% of her meals and nutrition recommending alternative forms of nutritions  She has lost 70 lbs since admit (some of that is probably related to diuresis and some related to variable weights in hospital, but still significant)  Overall, placement in a facility has been difficult, and there is concern about her continued deteriorating under these circumstances  Plan  - now DNR/DNI  - see discussion regarding weight loss above  - consult palliative, ideally to talk to Leo and Cristy, whose confusion limits in depth discussion but tends to be better in the AM     Urinary retention: Resolved  * Retaining urine on 8/14. UA WNL, not worrisome for infection  * Requiring straight cath x1 on 8/20 PM  * Good UOP, no longer requiring straight cath. No evidence of obstruction on PVR.      Dysphagia due to CVA: Resolved  * Seen by SLP and noted with dysphagia. Initially required some intermittent fluid boluses.   * Was eventually able to advance to regular diet w/thin liquids     Dyslipidemia  * FLP this stay showed tot cholest 163, HDL 47, LDL 91, .   * Started on atorvastatin 40 mg daily     Volume overload dt diastolic CHF exacerbation: Improved  Essential hypertension  Hypokalemia, recurrent on lasix, and often refusing potassium   * Not on/needing meds PTA.   * As above, echo this stay showed EF 50% with grade I diastolic dysfunction; RV not well visualized but appeared  mild-moderately dilated with global systolic function probably mildly reduced.   * BPs were initially soft this stay and required IVFs. BPs improved.   * Developed pedal edema required IV Lasix. Ultimately transition to oral Lasix.  * Started on lisinopril this stay  * BPs improved during stay.   * Lasix decreased from 40mg in AM and 20mg in PM to 20mg BID on 9/3 and added spironolactone 25mg daily    -- reduced lisinopril dose to 5mg daily with parameters on 9/14, will hold lisinopril completely for now as she needs diuretics  -- cont Lasix 20mg po BID  -- reduced spironolactone to 12.5mg daily with parameters on 9/14  -- cont KCl 20mEq po daily  -- f/u bp and adjust closely as indicated     Hypophosphatemia: Resolved     Prolonged QTc   * EKG on 7/13 showed QTc 494.   * Repeat EKG on 7/30 (while on Levaquin) showed QTc table at 475. Has since completed course of abx as below.  * Had been monitoring on telemetry this stay. No concerning findings so tele was ultimately dc'd 8/19     Chronic bilateral LE edema with chronic venous stasis dermatitis and chronic skin changes  Hx of LLE cellulitis  * Was hospitalized in 11/2021 for sepsis dt to pneumonia and LLE cellulitis.   * During this stay, BLE noted to be patty and edematous, no unilateral leg swelling appreciated; LLE had patchy areas of erythema, no fluctuance, no painful areas with palpation; legs warm to touch. Lymphedema consulted.   -- cont LE elevation, lymphedema wraps     Lower back/R flank wound/abscess, suspect dt pressure type injury, s/p surgical debridement on 7/30/22 and placement of wound vac on 8/2/22,  OA  Hx of bilateral hip replacements   Hx of chronic neck and back pain  * Pt with significant back pain early on in hospital stay. Was requiring IV hydromorphone.  Dose had to be decreased dt somnolence.  * MRI L-spine in 2018 showed multilevel degenerative changes with mild central stenosis. Patient has chronic back pain, reports having it over  the last 2 years.   * During this stay, patient was noted with 5-6cm by 2-3cm wound with swelling/fluid/blistering in a fold of her lower back, did not appear infected; this seemed to be the source of her pain. Padded dressing placed and WOC RN ordered. Pain initially improved.   * Was placed on course of Augmentin on 7/24.   * On 7/26, was re-evaluated by WOC RN. Wound appeared more erythematous and purulence expressed. Worsening with shear stress from lift. Procal <0.05, WBC WNL. Abx changed to IV clindamycin.  * Wound cultures obtained. On 7/28, wound grew proteus and staph aureus (MRSA neg). US neg for abscess.   * Lost IV access on 7/28 so abx were changed to oral clindamycin and oral levaquin. IV access re-established but was continued on oral abx.  * General surgery consulted, ultimately underwent excisional debridement of R flank abscess in OR on 7/30. Intraop cultures showed polymicrobial growth with proteus mirabilis x2 strains (both pan-sensitive), corynebacterium striatum and enterococcus faecalis.  * Wound vac placed per general surgery on 8/2 >> subsequently removed during stay.  * ID consulted on 8/2 and abx narrowed to Augmentin alone, completed an additional 5 days of treatment on 8/7.      -- wound per WOC RN, follows weekly  -- prns available for pain  -- cont regular repositioning     Pressure Injury, left Medial thigh/groin  Pressure Injury Stage: Deep Tissue Pressure Injury (DTPI), hospital acquired, now HAPI stage 2 pressure injury device related purwick external catheter tubing (not the purwick device itself)  - Wound care following    Pressure injury bilateral buttocks  * Noted by nursing on 09/02/22. WOC RN following as above.      Suspected meningioma left parietal region, incidentally seen on admission CT on 7/13  * Head CT 7/13 showed a calcified extra-axial mass overlying the left parietal region measuring 1.4 cm, potentially representing a meningioma.  * Will need serial monitoring OP  after discharge.      Anemia, suspect dilutional component: Resolved  * Hgb normal on admit. Hgb 11.5 on 7/16. No overt clinical signs of major bleeding.  * Hgb now stable at 12-13      ntertriginous dermatitis  * Chronic and stable, cont clotrimazole powder     Constipation  * Continue sched bowel regimen     Morbid obesity  * BMI 59 on admission. Recommend aggressive dietary and lifestyle modifications as condition improves     Suspected sleep apnea  * O2 drop to mid 80s overnight 8/14. Briefly placed on 4LPM, which she then removed and then slept okay with sats in 90s thereafter.  * Recommend sleep study as outpatient       COVID-19 vaccination status  * Received first dose of vaccine on 8/28, 2nd dose due 9/18/22       Severe malnutrition. In Context of:  Acute illness or injury  Chronic illness or disease  Malnutrition: (8/31)   % Weight Loss:  > 5% in 1 month (severe malnutrition) - significant loss over this admit  % Intake:  </= 50% for >/= 5 days (severe malnutrition) - consistent this admit x49 days  Subcutaneous Fat Loss:  Orbital region moderate depletion - visual, per 8/16 note  Muscle Loss:  Temporal region moderate depletion - visual, per 8/16 note  Fluid Retention:  Mild generalized edema  - dietitian following, recommend alternative feeding source? Not clear she would tolerate a tube feed with her confusion, will monitor, perhaps consider some carb counting          Diet: Advance Diet as Tolerated  Regular Diet Adult Thin Liquids (level 0) (Upright position, alert, and assist as needed)  Room Service    DVT Prophylaxis: Enoxaparin (Lovenox) SQ  [place] Catheter: Not present  Central Lines: None  Cardiac Monitoring: None  Code Status: No CPR- Do NOT Intubate      Disposition Plan      Expected Discharge Date: 09/26/2022    Discharge Delays: Placement - LTC  Covid Vaccine for Placement  *Medically Ready for Discharge    Discharge Comments: . Writer resent referrals to [facility], [facility], [facility]  [facility], [facility] and [facility]. Writer also sent a referral to matheus Hua and . Will get 2nd dose of covid vaccine later this month.        The patient's care was discussed with the Patient.    Hamilton Higginbotham DO  Hospitalist Service  [facility]  Securely message with the Vocera Web Console (learn more here)  Text page via [employer] Paging/Directory         Clinically Significant Risk Factors Present on Admission                      ______________________________________________________________________    Interval History   Stable overnight.  No acute complaints. Somewhat confused. Discussed with SO and health care agent dulce maria in depth    Data reviewed today: I reviewed all medications, new labs and imaging results over the last 24 hours. I personally reviewed no images or EKG's today.    Physical Exam   Vital Signs: Temp: 97.8  F (36.6  C) Temp src: Axillary BP: 107/79 Pulse: 52   Resp: 16 SpO2: 96 % O2 Device: None (Room air)    Weight: 283 lbs 0 oz  General Appearance: Obese , no distress, alert and conversant  Respiratory: Lungs clear  Cardiovascular: RRR  GI: Nontender, nondistended  Skin: No rash  Extremities: No edema, moving all extremities      Data   Recent Labs   Lab 09/22/22  0822 09/21/22  1629 09/21/22  0833 09/20/22  0916 09/19/22  1458 09/19/22  0907 09/18/22  1436 09/17/22  2032 09/17/22  1241    302  --   --   --   --  274  --   --    NA  --   --   --  138  --  139  --   --   --    POTASSIUM 3.6  --  3.5 3.6   < > 3.3*  --    < >  --    CHLORIDE  --   --   --  104  --  103  --   --   --    CO2  --   --   --  27  --  30  --   --   --    BUN  --   --   --  17  --  17  --   --   --    CR  --   --   --  0.68  --  0.69  --   --   --    ANIONGAP  --   --   --  7  --  6  --   --   --    ASHIA  --   --   --  9.1  --  8.9  --   --   --    GLC  --   --   --  106*  --  107*  --   --  108*    < > = values in this interval not displayed.      No results found for this or any previous visit (from the past 24 hour(s)).  Medications     - MEDICATION INSTRUCTIONS -         ARIPiprazole  10 mg Oral QPM     aspirin  325 mg Oral Daily     atorvastatin  40 mg Oral QPM     enoxaparin ANTICOAGULANT  40 mg Subcutaneous Q12H     furosemide  20 mg Oral BID     [Held by provider] lisinopril  5 mg Oral Daily     miconazole   Topical BID     multivitamins w/minerals  15 mL Oral Daily     OLANZapine  2.5 mg Oral At Bedtime     polyethylene glycol  17 g Oral Daily     potassium chloride  20 mEq Oral Daily     senna-docusate  1-2 tablet Oral BID     sertraline  50 mg Oral Daily     spironolactone  12.5 mg Oral Daily

## 2022-09-23 NOTE — PROGRESS NOTES
Palliative consult received for goals of care. Reviewed case with Dr. Higginbotham today. I attempted to call partner, Leo, multiple times throughout the day with no answer. RN was also unable to reach him. I did eventually reach Leo just now. I was able to introduce myself and palliative care services; assistance in symptom management, emotional and spiritual support, and complex medical decision making. Leo is open to coming to the hospital for a meeting with Cristy and me. He states that no other family members are able to participate. We agree to meet on Wednesday 9/28 at 10AM. Thanks.    ISRAEL Lloyd [facility]  Contact information available via [facility] Paging/Directory

## 2022-09-23 NOTE — PLAN OF CARE
Goal Outcome Evaluation:    Cognitive Concerns/ Orientation : A&O x 2, disoriented to time and situation. No insight to situation. Tearful at times. Frequent emotional support provided.  BEHAVIOR & AGGRESSION TOOL COLOR: Green  ABNL VS/O2: VSS on RA  MOBILITY: Total cares, lift for transfer, T&R Q2h (refuses at times). Pulsate mattress, BLE elevated on pillows. Does not like pillows, frequent education on skin breakdown  PAIN MANAGEMENT: Denies   DIET: Regular  BOWEL/BLADDER: Incontinent, purewick in use/urine tea color, explained that she needs to take in more fluids.   ABNL LAB/BG: None  DRAIN/DEVICES: None  SKIN: BLE patty, flaky with +2 edema. Edema wear in place. Wound cares completed per POC/WOC following weekly  D/C DATE: Pending placement  Discharge Barriers: LTC placement, SW following  OTHER IMPORTANT INFO:

## 2022-09-24 NOTE — PROGRESS NOTES
[facility]    Medicine Progress Note - Hospitalist Service    Date of Admission:  7/13/2022    Assessment & Plan      Cristy Bailey is a 77 year old female with a PMHx significant for morbid obesity, hypertension, and hyperlipidemia, who presented to [facility] 7/13/2022 with complete left-sided paralysis, left-sided facial droop, and difficulty speaking. CTA head remarkable for right MCA occlusion. She was given tenecteplase and subsequently transferred to [facility] 7/13/2022 for thrombectomy.   Hospital stay complicated by flank wound which required surgical debridement and placement of wound vac and findings LTC placement.     Acute R MCA ischemic stroke with edema and right to left midline shift  S/p tenecteplase and subsequent R MCA mechanical thrombectomy 7/13/22  * Presented to [facility] 7/13 with complete paralysis, left-sided facial droop, and aphasia. Code stroke initiated. Head CT 7/13 showed signs of an evolving R MCA infarct. CTA head 7/13 showed a right carotid terminus occlusion, right middle cerebral artery M1  segment occlusion with poor opacification of the more distal right MCA branches/poor collateral flow. CTA neck 7/13 negative for acute occlusions. Pt given tenecteplase 7/13 at 13:11. Noted to be agitated and was subsequently intubated given need for procedure.   * Subsequently transferred to [facility] 7/13/2022 where she underwent above procedure.   7/14: Extubated. Head CT 7/14 showed evolution of acute infarct involving the R MCA distribution with increased swelling, cortical effacement, and new mild right-to-left midline shift; questionable hypoattenuation involving the bilateral occipital lobes which could be artifactual.   * Additional stroke workup pursued this stay -- echo 7/14 showed EF 50%, grade 1 diastolic dysfunction   * Started ASA and atorvastatin per stroke team.   * Repeat head CT 7/15 showed evolving R MCA stroke with areas of edema,  overall stable.   plan  -- conts on full dose ASA and statin  -- goal SBP <140/90 and met   -- monitored on telemetry for first several wks of hospital stay without abnl rhythm so can likely defer prior recommendations for 30d cardiac event monitor at discharge  -- will need to follow up with [facility] in 6-8 wks (due beginning of September)  Waiting on placement     Severe malnutrition in context of acute illness and chronic disease  * Nutritionist following. Appetite poor this stay, needing encouragement to take po.   *there is some discussion about needing feeding support, but patient does not want to have a feeding tube.  * 70 lb weight loss in the last few months (likely lower in the context of diuresis for CHF and inherent error in measuring)  Plan  - encourage oral intake  - patient does not want a feeding tube at this point and her health care agent dulce maria agrees with this, consistent with previous wishes     Anxiety   Cognitive impairment, confusion as above.   * As stay has progressed, patient has consistently been oriented x2 (to self, location), unable to state the year. Also noted to have intermittent situational confusion. States family members have been visiting when they have not.   * Ongoing issues with anxiety this stay -- had been on regimen of Seroquel 12.5mg at dinner and 25mg HS with 12.5mg po q6h prn available. Mirtazapine 7.5mg on 8/15 evening given decreased appetite  * Psych consulted for assistance with ongoing mgmt -- seen on 8/16 and meds adjusted. Seroquel HS changed to Abilify 5mg HS. Recommended Zyptexa 5mg q6h prn for breakthrough insomnia/agitation while going off Seroquel. Consider uptitration of Abilify per psych recs. Advised uptitration of sertraline (25mg x3d then increase to 50mg daily beginning 8/20) and Remeron stopped. Also advised to liberalize Ativan to 0.5mg q6h prn.    * Seen by psych on 8/24, Abilify increased from 5mg to 10mg.  * PRN ativan stopped on 08/27/22 as leading to  increased confusion. Hydroxyzine stopped  as well.   -- cont current regimen of meds including Abilify 5mg every evening, Zyprexa 2.5mg HS and sertraline 50mg daily  -- cont prns per psych including Zyprexa 5mg q6h prn  -- Patient is crying and calling out frequently through the day.     Goals of care, failure to thrive  Discussed with patient and significant other/Health care agent Leo. She remains confused. He reiterated her wishes for DNR/DNI and no feeding tubes   We discussed concern about overall prognosis, with her eating 0-25% of her meals and nutrition recommending alternative forms of nutritions  She has lost 70 lbs since admit (some of that is probably related to diuresis and some related to variable weights in hospital, but still significant)  Overall, placement in a facility has been difficult, and there is concern about her continued deteriorating under these circumstances  Plan  - now DNR/DNI  - see discussion regarding weight loss above  - consult palliative, ideally to talk to Leo and Cristy, whose confusion limits in depth discussion but tends to be better in the AM     Urinary retention: Resolved  * Retaining urine on 8/14. UA WNL, not worrisome for infection  * Requiring straight cath x1 on 8/20 PM  * Good UOP, no longer requiring straight cath. No evidence of obstruction on PVR.      Dysphagia due to CVA: Resolved  * Seen by SLP and noted with dysphagia. Initially required some intermittent fluid boluses.   * Was eventually able to advance to regular diet w/thin liquids     Dyslipidemia  * FLP this stay showed tot cholest 163, HDL 47, LDL 91, .   * Started on atorvastatin 40 mg daily     Volume overload dt diastolic CHF exacerbation: Improved  Essential hypertension  Hypokalemia, recurrent on lasix, and often refusing potassium   * Not on/needing meds PTA.   * As above, echo this stay showed EF 50% with grade I diastolic dysfunction; RV not well visualized but appeared mild-moderately  dilated with global systolic function probably mildly reduced.   * BPs were initially soft this stay and required IVFs. BPs improved.   * Developed pedal edema required IV Lasix. Ultimately transition to oral Lasix.  * Started on lisinopril this stay  * BPs improved during stay.   * Lasix decreased from 40mg in AM and 20mg in PM to 20mg BID on 9/3 and added spironolactone 25mg daily     -- reduced lisinopril dose to 5mg daily with parameters on 9/14, will hold lisinopril completely for now as she needs diuretics  -- cont Lasix 20mg po BID  -- reduced spironolactone to 12.5mg daily with parameters on 9/14  -- cont KCl 20mEq po daily  -- f/u bp and adjust closely as indicated     Hypophosphatemia: Resolved     Prolonged QTc   * EKG on 7/13 showed QTc 494.   * Repeat EKG on 7/30 (while on Levaquin) showed QTc table at 475. Has since completed course of abx as below.  * Had been monitoring on telemetry this stay. No concerning findings so tele was ultimately dc'd 8/19     Chronic bilateral LE edema with chronic venous stasis dermatitis and chronic skin changes  Hx of LLE cellulitis  * Was hospitalized in 11/2021 for sepsis dt to pneumonia and LLE cellulitis.   * During this stay, BLE noted to be patty and edematous, no unilateral leg swelling appreciated; LLE had patchy areas of erythema, no fluctuance, no painful areas with palpation; legs warm to touch. Lymphedema consulted.   -- cont LE elevation, lymphedema wraps     Lower back/R flank wound/abscess, suspect dt pressure type injury, s/p surgical debridement on 7/30/22 and placement of wound vac on 8/2/22,  OA  Hx of bilateral hip replacements   Hx of chronic neck and back pain  * Pt with significant back pain early on in hospital stay. Was requiring IV hydromorphone.  Dose had to be decreased dt somnolence.  * MRI L-spine in 2018 showed multilevel degenerative changes with mild central stenosis. Patient has chronic back pain, reports having it over the last 2 years.    * During this stay, patient was noted with 5-6cm by 2-3cm wound with swelling/fluid/blistering in a fold of her lower back, did not appear infected; this seemed to be the source of her pain. Padded dressing placed and WOC RN ordered. Pain initially improved.   * Was placed on course of Augmentin on 7/24.   * On 7/26, was re-evaluated by WOC RN. Wound appeared more erythematous and purulence expressed. Worsening with shear stress from lift. Procal <0.05, WBC WNL. Abx changed to IV clindamycin.  * Wound cultures obtained. On 7/28, wound grew proteus and staph aureus (MRSA neg). US neg for abscess.   * Lost IV access on 7/28 so abx were changed to oral clindamycin and oral levaquin. IV access re-established but was continued on oral abx.  * General surgery consulted, ultimately underwent excisional debridement of R flank abscess in OR on 7/30. Intraop cultures showed polymicrobial growth with proteus mirabilis x2 strains (both pan-sensitive), corynebacterium striatum and enterococcus faecalis.  * Wound vac placed per general surgery on 8/2 >> subsequently removed during stay.  * ID consulted on 8/2 and abx narrowed to Augmentin alone, completed an additional 5 days of treatment on 8/7.      -- wound per WOC RN, follows weekly  -- prns available for pain  -- cont regular repositioning     Pressure Injury, left Medial thigh/groin  Pressure Injury Stage: Deep Tissue Pressure Injury (DTPI), hospital acquired, now HAPI stage 2 pressure injury device related purwick external catheter tubing (not the purwick device itself)  - Wound care following     Pressure injury bilateral buttocks  * Noted by nursing on 09/02/22. WOC RN following as above.      Suspected meningioma left parietal region, incidentally seen on admission CT on 7/13  * Head CT 7/13 showed a calcified extra-axial mass overlying the left parietal region measuring 1.4 cm, potentially representing a meningioma.  * Will need serial monitoring OP after discharge.       Anemia, suspect dilutional component: Resolved  * Hgb normal on admit. Hgb 11.5 on 7/16. No overt clinical signs of major bleeding.  * Hgb now stable at 12-13      ntertriginous dermatitis  * Chronic and stable, cont clotrimazole powder     Constipation  * Continue sched bowel regimen     Morbid obesity  * BMI 59 on admission. Recommend aggressive dietary and lifestyle modifications as condition improves     Suspected sleep apnea  * O2 drop to mid 80s overnight 8/14. Briefly placed on 4LPM, which she then removed and then slept okay with sats in 90s thereafter.  * Recommend sleep study as outpatient        COVID-19 vaccination status  * Received first dose of vaccine on 8/28, 2nd dose due 9/18/22        Severe malnutrition. In Context of:  Acute illness or injury  Chronic illness or disease  Malnutrition: (8/31)   % Weight Loss:  > 5% in 1 month (severe malnutrition) - significant loss over this admit  % Intake:  </= 50% for >/= 5 days (severe malnutrition) - consistent this admit x49 days  Subcutaneous Fat Loss:  Orbital region moderate depletion - visual, per 8/16 note  Muscle Loss:  Temporal region moderate depletion - visual, per 8/16 note  Fluid Retention:  Mild generalized edema  - dietitian following, recommend alternative feeding source? Not clear she would tolerate a tube feed with her confusion, will monitor, perhaps consider some carb counting     9/24- stable but refuses K replacement. Continue present care.  Awaiting palliative care discussion with partner next week          Diet: Advance Diet as Tolerated  Regular Diet Adult Thin Liquids (level 0) (Upright position, alert, and assist as needed)  Room Service    DVT Prophylaxis: Enoxaparin (Lovenox) SQ  [place] Catheter: Not present  Central Lines: None  Cardiac Monitoring: None  Code Status: No CPR- Do NOT Intubate      Disposition Plan     Expected Discharge Date: 09/27/2022    Discharge Delays: Placement - LTC  Covid Vaccine for  Placement  *Medically Ready for Discharge    Discharge Comments: . Writer resent referrals to [facility], [facility], [facility], [facility] and [facility]. Writer also sent a referral to matheus [facility] and . Will get 2nd dose of covid vaccine later this month.        The patient's care was discussed with the Patient and nurse    Donal Rodriguez MD  Hospitalist Service  [facility]  Securely message with the Vocera Web Console (learn more here)  Text page via [employer] Paging/Directory         Clinically Significant Risk Factors Present on Admission                      ______________________________________________________________________    Interval History   No pain or complaints     Data reviewed today: I reviewed all medications, new labs and imaging results over the last 24 hours. I personally reviewed no images or EKG's today.    Physical Exam   Vital Signs: Temp: 97.3  F (36.3  C) Temp src: Oral BP: 114/66 Pulse: 72   Resp: 18 SpO2: 97 % O2 Device: None (Room air)    Weight: 283 lbs 0 oz  Constitutional: awake, alert, cooperative, no apparent distress  Respiratory: No increased work of breathing, good air exchange, clear to auscultation bilaterally, no crackles or wheezing  Cardiovascular: regular rate and rhythm, normal S1 and S2, no S3 or S4, and no murmur noted  GI: normal bowel sounds, soft, non-distended, non-tender,  Neurologic: Awake, aler  Neuropsychiatric: General: normal, calm and normal eye contact    Data   Recent Labs   Lab 09/23/22  1450 09/23/22  0814 09/22/22  0822 09/21/22  1629 09/21/22  0833 09/20/22  0916 09/19/22  1458 09/19/22  0907 09/18/22  1436 09/17/22  2032 09/17/22  1241   PLT  --   --  300 302  --   --   --   --  274  --   --    NA  --   --   --   --   --  138  --  139  --   --   --    POTASSIUM 3.7 3.4 3.6  --    < > 3.6   < > 3.3*  --    < >  --    CHLORIDE  --   --   --   --   --  104  --  103  --   --   --     CO2  --   --   --   --   --  27  --  30  --   --   --    BUN  --   --   --   --   --  17  --  17  --   --   --    CR  --   --   --   --   --  0.68  --  0.69  --   --   --    ANIONGAP  --   --   --   --   --  7  --  6  --   --   --    ASHIA  --   --   --   --   --  9.1  --  8.9  --   --   --    GLC  --   --   --   --   --  106*  --  107*  --   --  108*    < > = values in this interval not displayed.     No results found for this or any previous visit (from the past 24 hour(s)).  Medications     - MEDICATION INSTRUCTIONS -         ARIPiprazole  10 mg Oral QPM     aspirin  325 mg Oral Daily     atorvastatin  40 mg Oral QPM     enoxaparin ANTICOAGULANT  40 mg Subcutaneous Q12H     furosemide  20 mg Oral BID     [Held by provider] lisinopril  5 mg Oral Daily     miconazole   Topical BID     multivitamins w/minerals  15 mL Oral Daily     OLANZapine  2.5 mg Oral At Bedtime     polyethylene glycol  17 g Oral Daily     potassium chloride  20 mEq Oral Daily     senna-docusate  1-2 tablet Oral BID     sertraline  50 mg Oral Daily     spironolactone  12.5 mg Oral Daily

## 2022-09-24 NOTE — PLAN OF CARE
Summary: CVA R MCA  DATE & TIME:  9/23/22 PM shift  Cognitive Concerns/ Orientation : A&O x 2, disoriented to time and situation. Pt has very poor insight on her clinical situation. Voices wanting to be discharged home.   BEHAVIOR & AGGRESSION TOOL COLOR: Green, anxious at times. Feels better when emotional support provided.   ABNL VS/O2: VSS on RA  MOBILITY: Total cares, lift for transfer, T&R Q2h (favors left side). Frequently wants to be repositioned. Pulsate mattress, BLE elevated on pillows. Kicks pillows out underneath her legs/feet, pt educated on importance of elevated rickie LE. Up to the chair this morning.  PAIN MANAGEMENT: c/o of lower back pain, repositioned for comfort.   DIET: Regular, very poor intake. Had about 3/4 cup of water this morning with pills. And two spoons of pudding with potassium, however did spit out some of the pudding out. Encouraged to take sips of water, pt needs reminded. Will take only sip at time.   BOWEL/BLADDER: Incontinent of B&B.  Purewick in use, low urine output. Pt was educated on importance of drinking fluids.   ABNL LAB/BG: K 3.4 replaced, recheck scheduled for 2:30 pm.   DRAIN/DEVICES: None  SKIN: BLE patty, flaky with +2 edema. Edema wear in place. Wound cares completed on day shift   D/C DATE: Pending, palliative consulted. Meeting on 9/28   Discharge Barriers: LTC placement, SW is following  OTHER IMPORTANT INFO:

## 2022-09-24 NOTE — PLAN OF CARE
Goal Outcome Evaluation:                    Summary: CVA R MCA  DATE & TIME:  9/23/22-9/24/22 [id number]  Cognitive Concerns/ Orientation : A&O x 2, disoriented to time and situation. Pt has very poor insight on her clinical situation. Voices wanting to be discharged home. BEHAVIOR & AGGRESSION TOOL COLOR: Green, anxious at times. Feels better when emotional support provided.   ABNL VS/O2: VSS on RA  MOBILITY: Total cares, lift for transfer, T&R Q2h (favors left side). Pulsate mattress, BLE elevated on pillows. Kicks pillows out underneath her legs/feet, pt educated on importance of elevated rickie LE.  PAIN MANAGEMENT: Denies.   DIET: Regular, very poor intake. Encouraged to take sips of water, pt needs reminded. Will take only sip at time.  BOWEL/BLADDER: Incontinent of B&B.  Purewick in use, no urine output this shift. Pt was educated on importance of drinking fluids. No BM this shift  ABNL LAB/BG: K 3.4 replaced, recheck scheduled for 2:30 pm.   DRAIN/DEVICES: None  SKIN: BLE patty, flaky with +2 edema. Edema wear in place. Wound cares completed per WOC RN recommendations.   D/C DATE: Pending, palliative consulted. Unable to see pt today, will plan for Monday.   Discharge Barriers: LTC placement, SW is following  OTHER IMPORTANT INFO: tried mixing K with apple juice, pt just took few sips. Pt also declined multivitamin.

## 2022-09-24 NOTE — PLAN OF CARE
"Summary: CVA R MCA  DATE & TIME:  9/24/22 AM shift  Cognitive Concerns/ Orientation : A&O x 2, disoriented to time and situation. Pt has very poor insight on her clinical situation. Voices wanting to be discharged home. Got very tearful this morning when heard that her K is low and needs to be replaced. Pt refused taking PO replacement, \"I cant take it, I will gag, and throw up\". IV K replacement initiated but unable to continue,  pt started to complain of IV burning sensation. MD notified. Will encourage bananas for meals.   BEHAVIOR & AGGRESSION TOOL COLOR: Green, anxious at times. Feels better when emotional support provided.   ABNL VS/O2: VSS on RA  MOBILITY: Total cares, lift for transfer, T&R Q2h (favors left side). Pulsate mattress, BLE elevated on pillows. Kicks pillows out underneath her legs/feet, pt educated on importance of elevated rickie LE. Up to the chair this morning.  PAIN MANAGEMENT: c/o of lower back pain, repositioned for comfort.   DIET: Regular, very poor intake. Encouraged to take sips of water, pt needs to be reminded. Will take only sip at time. Banana ordered for low K, ate half of it.   BOWEL/BLADDER: Incontinent of B&B.  Purewick in use, no urine output this shift. Pt was educated on importance of drinking fluids. No BM this shift  ABNL LAB/BG: K 3.3, unable to replace, MD aware. Encourage to eat bananas.    DRAIN/DEVICES:PIV SL  SKIN: BLE patty, flaky with +2 edema. Edema wear in place. Wound cares completed per WOC RN recommendations.   D/C DATE: Pending, palliative consulted (Wed at 10 AM). SO Leo visited pt today.  Discharge Barriers: LTC placement, SW is following  OTHER IMPORTANT INFO: replacing K continues to be a problem, MD aware.      "

## 2022-09-25 NOTE — PLAN OF CARE
Summary: CVA R MCA  DATE & TIME:  9/24/22 3 p to 11 p  Cognitive Concerns/ Orientation : A&O x 2, disoriented to time and situation.   BEHAVIOR & AGGRESSION TOOL COLOR: Green this shift, calm and pleasant  ABNL VS/O2: VSS on RA  MOBILITY: Total cares, lift for transfer, T&R Q2h, needs to be educated re importance of offloading  PAIN MANAGEMENT: Denies  DIET: Regular, very poor intake, did not order dinner, encouraged to eat banana   BOWEL/BLADDER: Incontinent of B&B.  Purewick in use, No BM this shift  ABNL LAB/BG: K 3.3, unable to replace from AM shift, MD aware. Encourage to eat bananas.    DRAIN/DEVICES:PIV SL  SKIN: BLE patty, flaky with +2 edema. Edema wear in place. Wound cares completed by AM shift RN  D/C DATE: Pending, palliative consulted (Wed at 10 AM)  Discharge Barriers: LTC placement, SW is following  OTHER IMPORTANT INFO:

## 2022-09-25 NOTE — PLAN OF CARE
Summary: CVA R MCA  DATE & TIME:  9/25/22 AM shift  Cognitive Concerns/ Orientation : A&O x 2, disoriented to time and situation. Pt has very poor insight on her clinical situation. Voices wanting to be discharged home.  BEHAVIOR & AGGRESSION TOOL COLOR: Green, anxious at times. Feels better when emotional support provided.   ABNL VS/O2: VSS on RA  MOBILITY: Total cares, lift for transfer, T&R Q2h (favors left side). Pulsate mattress, BLE elevated on pillows. Kicks pillows out underneath her legs/feet, pt educated on importance of elevated rickie LE. Up to the chair x 2 this shift  PAIN MANAGEMENT: c/o of lower back pain r/t wound, repositioned for comfort. Dressing changed to lower back per plan of care  DIET: Regular, very poor intake. Encouraged to take sips of water, pt needs to be reminded. Will take only sip at time. Banana ordered for low K, ate half of it. MD aware about pt refusing K replacements, plan to continue offer replacements (writer tried mixing it with apple/orange/ginger ale/apple sauce, pudding, mashed potatoes-without success). Offer banana for snacks.    BOWEL/BLADDER: Incontinent of B&B.  Purewick in use, 300 ml this shift. Pt was educated on importance of drinking fluids. No BM this shift, pt is on bowel regiment.   ABNL LAB/BG: K 3.3, unable to replace, MD aware. Encourage to eat bananas.    DRAIN/DEVICES:PIV SL  SKIN: BLE patty, flaky with +2 edema. Edema wear in place. Wound cares completed per WOC RN recommendations.   D/C DATE: Pending, palliative consulted (Wed at 10 AM).   Discharge Barriers: LTC placement, SW is following  OTHER IMPORTANT INFO: replacing K continues to be a problem, MD aware.

## 2022-09-25 NOTE — PLAN OF CARE
Goal Outcome Evaluation:                    Summary: CVA R MCA  DATE & TIME:  9/24/22-9/25/22 [id number]  Cognitive Concerns/ Orientation : A&Ox2, disoriented to time and situation. Pt has very poor insight on her clinical situation. Voices wanting to be discharged home.  BEHAVIOR & AGGRESSION TOOL COLOR: Green, anxious at times. Feels better when emotional support provided. PRN Zyprexa given x1 for insomnia and anxiety.  ABNL VS/O2: VSS on RA  MOBILITY: Total cares, lift for transfer, T&R Q2h (favors left side). Pulsate mattress, BLE elevated on pillows. Kicks pillows out underneath her legs/feet, pt educated on importance of elevated rickie LE. Up to the chair this morning.  PAIN MANAGEMENT: c/o of lower back pain, repositioned for comfort.   DIET: Regular, very poor intake. Encouraged to take sips of water, pt needs to be reminded. Will take only sip at time. Banana ordered for low K, ate half of it.   BOWEL/BLADDER: Incontinent of B&B.  Purewick in use, no urine output this shift. Pt was educated on importance of drinking fluids. No BM this shift  ABNL LAB/BG: K 3.3, unable to replace, MD aware.  DRAIN/DEVICES:PIV SL  SKIN: BLE patty, flaky with +2 edema. Edema wear in place. Wound cares completed per WOC RN recommendations.   D/C DATE: Pending, palliative consulted (Wed at 10 AM). SO Leo visited pt today.  Discharge Barriers: LTC placement, SW is following  OTHER IMPORTANT INFO: replacing K continues to be a problem, MD aware.

## 2022-09-25 NOTE — PROGRESS NOTES
[facility]    Medicine Progress Note - Hospitalist Service    Date of Admission:  7/13/2022    Assessment & Plan      Cristy Bailey is a 77 year old female with a PMHx significant for morbid obesity, hypertension, and hyperlipidemia, who presented to [facility] 7/13/2022 with complete left-sided paralysis, left-sided facial droop, and difficulty speaking. CTA head remarkable for right MCA occlusion. She was given tenecteplase and subsequently transferred to [facility] 7/13/2022 for thrombectomy.   Hospital stay complicated by flank wound which required surgical debridement and placement of wound vac and findings LTC placement.     Acute R MCA ischemic stroke with edema and right to left midline shift  S/p tenecteplase and subsequent R MCA mechanical thrombectomy 7/13/22  * Presented to [facility] 7/13 with complete paralysis, left-sided facial droop, and aphasia. Code stroke initiated. Head CT 7/13 showed signs of an evolving R MCA infarct. CTA head 7/13 showed a right carotid terminus occlusion, right middle cerebral artery M1  segment occlusion with poor opacification of the more distal right MCA branches/poor collateral flow. CTA neck 7/13 negative for acute occlusions. Pt given tenecteplase 7/13 at 13:11. Noted to be agitated and was subsequently intubated given need for procedure.   * Subsequently transferred to [facility] 7/13/2022 where she underwent above procedure.   7/14: Extubated. Head CT 7/14 showed evolution of acute infarct involving the R MCA distribution with increased swelling, cortical effacement, and new mild right-to-left midline shift; questionable hypoattenuation involving the bilateral occipital lobes which could be artifactual.   * Additional stroke workup pursued this stay -- echo 7/14 showed EF 50%, grade 1 diastolic dysfunction   * Started ASA and atorvastatin per stroke team.   * Repeat head CT 7/15 showed evolving R MCA stroke with areas of edema,  overall stable.   plan  -- conts on full dose ASA and statin  -- goal SBP <140/90 and met   -- monitored on telemetry for first several wks of hospital stay without abnl rhythm so can likely defer prior recommendations for 30d cardiac event monitor at discharge  -- will need to follow up with [facility] in 6-8 wks (due beginning of September)  Waiting on placement     Severe malnutrition in context of acute illness and chronic disease  * Nutritionist following. Appetite poor this stay, needing encouragement to take po.   *there is some discussion about needing feeding support, but patient does not want to have a feeding tube.  * 70 lb weight loss in the last few months (likely lower in the context of diuresis for CHF and inherent error in measuring)  Plan  - encourage oral intake  - patient does not want a feeding tube at this point and her health care agent dulce maria agrees with this, consistent with previous wishes     Anxiety   Cognitive impairment, confusion as above.   * As stay has progressed, patient has consistently been oriented x2 (to self, location), unable to state the year. Also noted to have intermittent situational confusion. States family members have been visiting when they have not.   * Ongoing issues with anxiety this stay -- had been on regimen of Seroquel 12.5mg at dinner and 25mg HS with 12.5mg po q6h prn available. Mirtazapine 7.5mg on 8/15 evening given decreased appetite  * Psych consulted for assistance with ongoing mgmt -- seen on 8/16 and meds adjusted. Seroquel HS changed to Abilify 5mg HS. Recommended Zyptexa 5mg q6h prn for breakthrough insomnia/agitation while going off Seroquel. Consider uptitration of Abilify per psych recs. Advised uptitration of sertraline (25mg x3d then increase to 50mg daily beginning 8/20) and Remeron stopped. Also advised to liberalize Ativan to 0.5mg q6h prn.    * Seen by psych on 8/24, Abilify increased from 5mg to 10mg.  * PRN ativan stopped on 08/27/22 as leading to  increased confusion. Hydroxyzine stopped  as well.   -- cont current regimen of meds including Abilify 5mg every evening, Zyprexa 2.5mg HS and sertraline 50mg daily  -- cont prns per psych including Zyprexa 5mg q6h prn  -- Patient is crying and calling out frequently through the day.     Goals of care, failure to thrive  Discussed with patient and significant other/Health care agent Leo. She remains confused. He reiterated her wishes for DNR/DNI and no feeding tubes   We discussed concern about overall prognosis, with her eating 0-25% of her meals and nutrition recommending alternative forms of nutritions  She has lost 70 lbs since admit (some of that is probably related to diuresis and some related to variable weights in hospital, but still significant)  Overall, placement in a facility has been difficult, and there is concern about her continued deteriorating under these circumstances  Plan  - now DNR/DNI  - see discussion regarding weight loss above  - consult palliative, ideally to talk to Leo and Cristy, whose confusion limits in depth discussion but tends to be better in the AM     Urinary retention: Resolved  * Retaining urine on 8/14. UA WNL, not worrisome for infection  * Requiring straight cath x1 on 8/20 PM  * Good UOP, no longer requiring straight cath. No evidence of obstruction on PVR.      Dysphagia due to CVA: Resolved  * Seen by SLP and noted with dysphagia. Initially required some intermittent fluid boluses.   * Was eventually able to advance to regular diet w/thin liquids     Dyslipidemia  * FLP this stay showed tot cholest 163, HDL 47, LDL 91, .   * Started on atorvastatin 40 mg daily     Volume overload dt diastolic CHF exacerbation: Improved  Essential hypertension  Hypokalemia, recurrent on lasix, and often refusing potassium   * Not on/needing meds PTA.   * As above, echo this stay showed EF 50% with grade I diastolic dysfunction; RV not well visualized but appeared mild-moderately  dilated with global systolic function probably mildly reduced.   * BPs were initially soft this stay and required IVFs. BPs improved.   * Developed pedal edema required IV Lasix. Ultimately transition to oral Lasix.  * Started on lisinopril this stay  * BPs improved during stay.   * Lasix decreased from 40mg in AM and 20mg in PM to 20mg BID on 9/3 and added spironolactone 25mg daily     -- reduced lisinopril dose to 5mg daily with parameters on 9/14, will hold lisinopril completely for now as she needs diuretics  -- cont Lasix 20mg po BID  -- reduced spironolactone to 12.5mg daily with parameters on 9/14  -- cont KCl 20mEq po daily  -- f/u bp and adjust closely as indicated     Hypophosphatemia: Resolved  Hypokalemia     Refusing replacement    Encourage bananas     Prolonged QTc   * EKG on 7/13 showed QTc 494.   * Repeat EKG on 7/30 (while on Levaquin) showed QTc table at 475. Has since completed course of abx as below.  * Had been monitoring on telemetry this stay. No concerning findings so tele was ultimately dc'd 8/19     Chronic bilateral LE edema with chronic venous stasis dermatitis and chronic skin changes  Hx of LLE cellulitis  * Was hospitalized in 11/2021 for sepsis dt to pneumonia and LLE cellulitis.   * During this stay, BLE noted to be patty and edematous, no unilateral leg swelling appreciated; LLE had patchy areas of erythema, no fluctuance, no painful areas with palpation; legs warm to touch. Lymphedema consulted.   -- cont LE elevation, lymphedema wraps     Lower back/R flank wound/abscess, suspect dt pressure type injury, s/p surgical debridement on 7/30/22 and placement of wound vac on 8/2/22,  OA  Hx of bilateral hip replacements   Hx of chronic neck and back pain  * Pt with significant back pain early on in hospital stay. Was requiring IV hydromorphone.  Dose had to be decreased dt somnolence.  * MRI L-spine in 2018 showed multilevel degenerative changes with mild central stenosis. Patient has  chronic back pain, reports having it over the last 2 years.   * During this stay, patient was noted with 5-6cm by 2-3cm wound with swelling/fluid/blistering in a fold of her lower back, did not appear infected; this seemed to be the source of her pain. Padded dressing placed and WOC RN ordered. Pain initially improved.   * Was placed on course of Augmentin on 7/24.   * On 7/26, was re-evaluated by WOC RN. Wound appeared more erythematous and purulence expressed. Worsening with shear stress from lift. Procal <0.05, WBC WNL. Abx changed to IV clindamycin.  * Wound cultures obtained. On 7/28, wound grew proteus and staph aureus (MRSA neg). US neg for abscess.   * Lost IV access on 7/28 so abx were changed to oral clindamycin and oral levaquin. IV access re-established but was continued on oral abx.  * General surgery consulted, ultimately underwent excisional debridement of R flank abscess in OR on 7/30. Intraop cultures showed polymicrobial growth with proteus mirabilis x2 strains (both pan-sensitive), corynebacterium striatum and enterococcus faecalis.  * Wound vac placed per general surgery on 8/2 >> subsequently removed during stay.  * ID consulted on 8/2 and abx narrowed to Augmentin alone, completed an additional 5 days of treatment on 8/7.      -- wound per WOC RN, follows weekly  -- prns available for pain  -- cont regular repositioning     Pressure Injury, left Medial thigh/groin  Pressure Injury Stage: Deep Tissue Pressure Injury (DTPI), hospital acquired, now HAPI stage 2 pressure injury device related purwick external catheter tubing (not the purwick device itself)  - Wound care following     Pressure injury bilateral buttocks  * Noted by nursing on 09/02/22. WOC RN following as above.      Suspected meningioma left parietal region, incidentally seen on admission CT on 7/13  * Head CT 7/13 showed a calcified extra-axial mass overlying the left parietal region measuring 1.4 cm, potentially representing a  meningioma.  * Will need serial monitoring OP after discharge.      Anemia, suspect dilutional component: Resolved  * Hgb normal on admit. Hgb 11.5 on 7/16. No overt clinical signs of major bleeding.  * Hgb now stable at 12-13      ntertriginous dermatitis  * Chronic and stable, cont clotrimazole powder     Constipation  * Continue sched bowel regimen     Morbid obesity  * BMI 59 on admission. Recommend aggressive dietary and lifestyle modifications as condition improves     Suspected sleep apnea  * O2 drop to mid 80s overnight 8/14. Briefly placed on 4LPM, which she then removed and then slept okay with sats in 90s thereafter.  * Recommend sleep study as outpatient        COVID-19 vaccination status  * Received first dose of vaccine on 8/28, 2nd dose due 9/18/22        Severe malnutrition. In Context of:  Acute illness or injury  Chronic illness or disease  Malnutrition: (8/31)   % Weight Loss:  > 5% in 1 month (severe malnutrition) - significant loss over this admit  % Intake:  </= 50% for >/= 5 days (severe malnutrition) - consistent this admit x49 days  Subcutaneous Fat Loss:  Orbital region moderate depletion - visual, per 8/16 note  Muscle Loss:  Temporal region moderate depletion - visual, per 8/16 note  Fluid Retention:  Mild generalized edema  - dietitian following, recommend alternative feeding source? Not clear she would tolerate a tube feed with her confusion, will monitor, perhaps consider some carb counting     9/25- no new issues, continue present care      Diet: Advance Diet as Tolerated  Regular Diet Adult Thin Liquids (level 0) (Upright position, alert, and assist as needed)  Room Service    DVT Prophylaxis: Enoxaparin (Lovenox) SQ  [place] Catheter: Not present  Central Lines: None  Cardiac Monitoring: None  Code Status: No CPR- Do NOT Intubate      Disposition Plan     Expected Discharge Date: 09/27/2022    Discharge Delays: Placement - LTC  Covid Vaccine for Placement  *Medically Ready for  Discharge    Discharge Comments: Multiple referrerals outl  Will get 2nd dose of covid vaccine later this month. Leo meeting with Palliative on the 28??        The patient's care was discussed with the Patient and nurse    Donal Rodriguez MD  Hospitalist [facility]  Securely message with the Vocera Web Console (learn more here)  Text page via [employer] Paging/Directory         Clinically Significant Risk Factors Present on Admission                      ______________________________________________________________________    Interval History   No pain or complaints     Data reviewed today: I reviewed all medications, new labs and imaging results over the last 24 hours. I personally reviewed no images or EKG's today.    Physical Exam   Vital Signs: Temp: 97.2  F (36.2  C) Temp src: Oral BP: 112/65 Pulse: 64   Resp: 18 SpO2: 95 % O2 Device: None (Room air)    Weight: 283 lbs 0 oz  Constitutional: awake, alert, cooperative, no apparent distress  Neurologic: Awake, alert  Neuropsychiatric: General: normal, calm and normal eye contact    Data   Recent Labs   Lab 09/25/22  0532 09/24/22  2129 09/24/22  0739 09/23/22  1450 09/23/22  0814 09/22/22  0822 09/21/22  1629 09/21/22  0833 09/20/22  0916 09/19/22  1458 09/19/22  0907   PLT  --  296  --   --   --  300 302  --   --   --   --    NA  --   --   --   --   --   --   --   --  138  --  139   POTASSIUM 3.3*  --  3.3* 3.7   < > 3.6  --    < > 3.6   < > 3.3*   CHLORIDE  --   --   --   --   --   --   --   --  104  --  103   CO2  --   --   --   --   --   --   --   --  27  --  30   BUN  --   --   --   --   --   --   --   --  17  --  17   CR  --   --   --   --   --   --   --   --  0.68  --  0.69   ANIONGAP  --   --   --   --   --   --   --   --  7  --  6   ASHIA  --   --   --   --   --   --   --   --  9.1  --  8.9   GLC  --   --   --   --   --   --   --   --  106*  --  107*    < > = values in this interval not displayed.     No results found  for this or any previous visit (from the past 24 hour(s)).  Medications     - MEDICATION INSTRUCTIONS -         ARIPiprazole  10 mg Oral QPM     aspirin  325 mg Oral Daily     atorvastatin  40 mg Oral QPM     enoxaparin ANTICOAGULANT  40 mg Subcutaneous Q12H     furosemide  20 mg Oral BID     [Held by provider] lisinopril  5 mg Oral Daily     miconazole   Topical BID     multivitamins w/minerals  15 mL Oral Daily     OLANZapine  2.5 mg Oral At Bedtime     polyethylene glycol  17 g Oral Daily     potassium chloride  20 mEq Oral Daily     potassium chloride  40 mEq Oral Once     senna-docusate  1-2 tablet Oral BID     sertraline  50 mg Oral Daily     spironolactone  12.5 mg Oral Daily

## 2022-09-26 NOTE — PROGRESS NOTES
[facility]    Medicine Progress Note - Hospitalist Service    Date of Admission:  7/13/2022    Assessment & Plan      Cristy Bailey is a 77 year old female with a PMHx significant for morbid obesity, hypertension, and hyperlipidemia, who presented to [facility] 7/13/2022 with complete left-sided paralysis, left-sided facial droop, and difficulty speaking. CTA head remarkable for right MCA occlusion. She was given tenecteplase and subsequently transferred to [facility] 7/13/2022 for thrombectomy.   Hospital stay complicated by flank wound which required surgical debridement and placement of wound vac and findings LTC placement.     Acute R MCA ischemic stroke with edema and right to left midline shift  S/p tenecteplase and subsequent R MCA mechanical thrombectomy 7/13/22  * Presented to [facility] 7/13 with complete paralysis, left-sided facial droop, and aphasia. Code stroke initiated. Head CT 7/13 showed signs of an evolving R MCA infarct. CTA head 7/13 showed a right carotid terminus occlusion, right middle cerebral artery M1  segment occlusion with poor opacification of the more distal right MCA branches/poor collateral flow. CTA neck 7/13 negative for acute occlusions. Pt given tenecteplase 7/13 at 13:11. Noted to be agitated and was subsequently intubated given need for procedure.   * Subsequently transferred to [facility] 7/13/2022 where she underwent above procedure.   7/14: Extubated. Head CT 7/14 showed evolution of acute infarct involving the R MCA distribution with increased swelling, cortical effacement, and new mild right-to-left midline shift; questionable hypoattenuation involving the bilateral occipital lobes which could be artifactual.   * Additional stroke workup pursued this stay -- echo 7/14 showed EF 50%, grade 1 diastolic dysfunction   * Started ASA and atorvastatin per stroke team.   * Repeat head CT 7/15 showed evolving R MCA stroke with areas of edema,  overall stable.   plan  -- conts on full dose ASA and statin  -- goal SBP <140/90 and met   -- monitored on telemetry for first several wks of hospital stay without abnl rhythm so can likely defer prior recommendations for 30d cardiac event monitor at discharge  -- will need to follow up with [facility] in 6-8 wks (due beginning of September)  Waiting on placement     Severe malnutrition in context of acute illness and chronic disease  * Nutritionist following. Appetite poor this stay, needing encouragement to take po.   *there is some discussion about needing feeding support, but patient does not want to have a feeding tube.  * 70 lb weight loss in the last few months (likely lower in the context of diuresis for CHF and inherent error in measuring)  Plan  - encourage oral intake  - patient does not want a feeding tube at this point and her health care agent dulce maria agrees with this, consistent with previous wishes     Anxiety   Cognitive impairment, confusion as above.   * As stay has progressed, patient has consistently been oriented x2 (to self, location), unable to state the year. Also noted to have intermittent situational confusion. States family members have been visiting when they have not.   * Ongoing issues with anxiety this stay -- had been on regimen of Seroquel 12.5mg at dinner and 25mg HS with 12.5mg po q6h prn available. Mirtazapine 7.5mg on 8/15 evening given decreased appetite  * Psych consulted for assistance with ongoing mgmt -- seen on 8/16 and meds adjusted. Seroquel HS changed to Abilify 5mg HS. Recommended Zyptexa 5mg q6h prn for breakthrough insomnia/agitation while going off Seroquel. Consider uptitration of Abilify per psych recs. Advised uptitration of sertraline (25mg x3d then increase to 50mg daily beginning 8/20) and Remeron stopped. Also advised to liberalize Ativan to 0.5mg q6h prn.    * Seen by psych on 8/24, Abilify increased from 5mg to 10mg.  * PRN ativan stopped on 08/27/22 as leading to  increased confusion. Hydroxyzine stopped  as well.   -- cont current regimen of meds including Abilify 5mg every evening, Zyprexa 2.5mg HS and sertraline 50mg daily  -- cont prns per psych including Zyprexa 5mg q6h prn  -- Patient is crying and calling out frequently through the day.     Goals of care, failure to thrive  Discussed with patient and significant other/Health care agent Leo. She remains confused. He reiterated her wishes for DNR/DNI and no feeding tubes   We discussed concern about overall prognosis, with her eating 0-25% of her meals and nutrition recommending alternative forms of nutritions  She has lost 70 lbs since admit (some of that is probably related to diuresis and some related to variable weights in hospital, but still significant)  Overall, placement in a facility has been difficult, and there is concern about her continued deteriorating under these circumstances  Plan  - now DNR/DNI  - see discussion regarding weight loss above  - consult palliative, ideally to talk to Leo and Cristy, whose confusion limits in depth discussion but tends to be better in the AM     Urinary retention: Resolved  * Retaining urine on 8/14. UA WNL, not worrisome for infection  * Requiring straight cath x1 on 8/20 PM  * Good UOP, no longer requiring straight cath. No evidence of obstruction on PVR.      Dysphagia due to CVA: Resolved  * Seen by SLP and noted with dysphagia. Initially required some intermittent fluid boluses.   * Was eventually able to advance to regular diet w/thin liquids     Dyslipidemia  * FLP this stay showed tot cholest 163, HDL 47, LDL 91, .   * Started on atorvastatin 40 mg daily     Volume overload dt diastolic CHF exacerbation: Improved  Essential hypertension  Hypokalemia, recurrent on lasix, and often refusing potassium   * Not on/needing meds PTA.   * As above, echo this stay showed EF 50% with grade I diastolic dysfunction; RV not well visualized but appeared mild-moderately  dilated with global systolic function probably mildly reduced.   * BPs were initially soft this stay and required IVFs. BPs improved.   * Developed pedal edema required IV Lasix. Ultimately transition to oral Lasix.  * Started on lisinopril this stay  * BPs improved during stay.   * Lasix decreased from 40mg in AM and 20mg in PM to 20mg BID on 9/3 and added spironolactone 25mg daily     -- reduced lisinopril dose to 5mg daily with parameters on 9/14, will hold lisinopril completely for now as she needs diuretics  -- cont Lasix 20mg po BID  -- reduced spironolactone to 12.5mg daily with parameters on 9/14  -- cont KCl 20mEq po daily  -- f/u bp and adjust closely as indicated     Hypophosphatemia: Resolved  Hypokalemia     Refusing replacement    Encourage bananas     Prolonged QTc   * EKG on 7/13 showed QTc 494.   * Repeat EKG on 7/30 (while on Levaquin) showed QTc table at 475. Has since completed course of abx as below.  * Had been monitoring on telemetry this stay. No concerning findings so tele was ultimately dc'd 8/19     Chronic bilateral LE edema with chronic venous stasis dermatitis and chronic skin changes  Hx of LLE cellulitis  * Was hospitalized in 11/2021 for sepsis dt to pneumonia and LLE cellulitis.   * During this stay, BLE noted to be patty and edematous, no unilateral leg swelling appreciated; LLE had patchy areas of erythema, no fluctuance, no painful areas with palpation; legs warm to touch. Lymphedema consulted.   -- cont LE elevation, lymphedema wraps     Lower back/R flank wound/abscess, suspect dt pressure type injury, s/p surgical debridement on 7/30/22 and placement of wound vac on 8/2/22,  OA  Hx of bilateral hip replacements   Hx of chronic neck and back pain  * Pt with significant back pain early on in hospital stay. Was requiring IV hydromorphone.  Dose had to be decreased dt somnolence.  * MRI L-spine in 2018 showed multilevel degenerative changes with mild central stenosis. Patient has  chronic back pain, reports having it over the last 2 years.   * During this stay, patient was noted with 5-6cm by 2-3cm wound with swelling/fluid/blistering in a fold of her lower back, did not appear infected; this seemed to be the source of her pain. Padded dressing placed and WOC RN ordered. Pain initially improved.   * Was placed on course of Augmentin on 7/24.   * On 7/26, was re-evaluated by WOC RN. Wound appeared more erythematous and purulence expressed. Worsening with shear stress from lift. Procal <0.05, WBC WNL. Abx changed to IV clindamycin.  * Wound cultures obtained. On 7/28, wound grew proteus and staph aureus (MRSA neg). US neg for abscess.   * Lost IV access on 7/28 so abx were changed to oral clindamycin and oral levaquin. IV access re-established but was continued on oral abx.  * General surgery consulted, ultimately underwent excisional debridement of R flank abscess in OR on 7/30. Intraop cultures showed polymicrobial growth with proteus mirabilis x2 strains (both pan-sensitive), corynebacterium striatum and enterococcus faecalis.  * Wound vac placed per general surgery on 8/2 >> subsequently removed during stay.  * ID consulted on 8/2 and abx narrowed to Augmentin alone, completed an additional 5 days of treatment on 8/7.      -- wound per WOC RN, follows weekly  -- prns available for pain  -- cont regular repositioning     Pressure Injury, left Medial thigh/groin  Pressure Injury Stage: Deep Tissue Pressure Injury (DTPI), hospital acquired, now HAPI stage 2 pressure injury device related purwick external catheter tubing (not the purwick device itself)  - Wound care following     Pressure injury bilateral buttocks  * Noted by nursing on 09/02/22. WOC RN following as above.      Suspected meningioma left parietal region, incidentally seen on admission CT on 7/13  * Head CT 7/13 showed a calcified extra-axial mass overlying the left parietal region measuring 1.4 cm, potentially representing a  meningioma.  * Will need serial monitoring OP after discharge.      Anemia, suspect dilutional component: Resolved  * Hgb normal on admit. Hgb 11.5 on 7/16. No overt clinical signs of major bleeding.  * Hgb now stable at 12-13      ntertriginous dermatitis  * Chronic and stable, cont clotrimazole powder     Constipation  * Continue sched bowel regimen     Morbid obesity  * BMI 59 on admission. Recommend aggressive dietary and lifestyle modifications as condition improves     Suspected sleep apnea  * O2 drop to mid 80s overnight 8/14. Briefly placed on 4LPM, which she then removed and then slept okay with sats in 90s thereafter.  * Recommend sleep study as outpatient        COVID-19 vaccination status  * Received first dose of vaccine on 8/28, 2nd dose due 9/18/22        Severe malnutrition. In Context of:  Acute illness or injury  Chronic illness or disease  Malnutrition: (8/31)   % Weight Loss:  > 5% in 1 month (severe malnutrition) - significant loss over this admit  % Intake:  </= 50% for >/= 5 days (severe malnutrition) - consistent this admit x49 days  Subcutaneous Fat Loss:  Orbital region moderate depletion - visual, per 8/16 note  Muscle Loss:  Temporal region moderate depletion - visual, per 8/16 note  Fluid Retention:  Mild generalized edema  - dietitian following, recommend alternative feeding source? Not clear she would tolerate a tube feed with her confusion, will monitor, perhaps consider some carb counting     9/26- Leo QUIJANO at the bedside- palliative care discussion Wed.  Continue present care.      Diet: Advance Diet as Tolerated  Regular Diet Adult Thin Liquids (level 0) (Upright position, alert, and assist as needed)  Room Service    DVT Prophylaxis: Enoxaparin (Lovenox) SQ  [place] Catheter: Not present  Central Lines: None  Cardiac Monitoring: None  Code Status: No CPR- Do NOT Intubate      Disposition Plan      Expected Discharge Date: 09/29/2022    Discharge Delays: Placement - LTC  Covid  Vaccine for Placement  *Medically Ready for Discharge    Discharge Comments: Multiple referrerals outl  Did receive her 2nd covid shot.. Leo meeting with Palliative on the 28??        The patient's care was discussed with the Patient and nurse    Donal Rodriguez MD  Hospitalist Service  [facility]  Securely message with the Vocera Web Console (learn more here)  Text page via [employer] Paging/Directory         Clinically Significant Risk Factors Present on Admission                      ______________________________________________________________________    Interval History   No pain or complaints     Data reviewed today: I reviewed all medications, new labs and imaging results over the last 24 hours. I personally reviewed no images or EKG's today.    Physical Exam   Vital Signs: Temp: 97.6  F (36.4  C) Temp src: Oral BP: 106/66 Pulse: 70   Resp: 16 SpO2: 97 % O2 Device: None (Room air)    Weight: 283 lbs 0 oz  Constitutional: awake, alert, cooperative, no apparent distress  Neurologic: Awake, alert  Neuropsychiatric: General: normal, calm and normal eye contact    Data   Recent Labs   Lab 09/26/22  0726 09/25/22  0532 09/24/22  2129 09/24/22  0739 09/23/22  0814 09/22/22  0822 09/21/22  1629 09/21/22  0833 09/20/22  0916   PLT  --   --  296  --   --  300 302  --   --    NA  --   --   --   --   --   --   --   --  138   POTASSIUM 3.2* 3.3*  --  3.3*   < > 3.6  --    < > 3.6   CHLORIDE  --   --   --   --   --   --   --   --  104   CO2  --   --   --   --   --   --   --   --  27   BUN  --   --   --   --   --   --   --   --  17   CR  --   --   --   --   --   --   --   --  0.68   ANIONGAP  --   --   --   --   --   --   --   --  7   ASHIA  --   --   --   --   --   --   --   --  9.1   GLC  --   --   --   --   --   --   --   --  106*    < > = values in this interval not displayed.     No results found for this or any previous visit (from the past 24 hour(s)).  Medications     - MEDICATION  INSTRUCTIONS -         ARIPiprazole  10 mg Oral QPM     aspirin  325 mg Oral Daily     atorvastatin  40 mg Oral QPM     enoxaparin ANTICOAGULANT  40 mg Subcutaneous Q12H     furosemide  20 mg Oral BID     [Held by provider] lisinopril  5 mg Oral Daily     miconazole   Topical BID     multivitamins w/minerals  15 mL Oral Daily     OLANZapine  2.5 mg Oral At Bedtime     polyethylene glycol  17 g Oral Daily     potassium chloride  20 mEq Oral Daily     senna-docusate  1-2 tablet Oral BID     sertraline  50 mg Oral Daily     sodium chloride (PF)  3 mL Intracatheter Q8H     spironolactone  12.5 mg Oral Daily

## 2022-09-26 NOTE — PLAN OF CARE
Summary: CVA R MCA  DATE & TIME:  9/25/22 PM shift  Cognitive Concerns/ Orientation : A&O x 2, disoriented to time and situation. Pt has very poor insight on her clinical situation. Voices wanting to be discharged home. Tearful this shift due to confusion with current situation.   BEHAVIOR & AGGRESSION TOOL COLOR: Green, anxious at times. Feels better when emotional support provided.   ABNL VS/O2: VSS on RA  MOBILITY: Total cares, lift for transfer, T&R Q2h (favors left side). Pulsate mattress, BLE elevated on pillows. Kicks pillows out underneath her legs/feet, pt educated on importance of elevated rickie LE. Up to the chair x 2 this shift  PAIN MANAGEMENT: denies pain this shift ex when repositioning/up in the lift  DIET: Regular, very poor intake. Encouraged to take sips of water, pt needs to be reminded. Will take only sip at time. Offer banana for snacks.   BOWEL/BLADDER: Incontinent of B&B.  Purewick in use, 300 ml this shift. Pt was educated on importance of drinking fluids. No BM x1, pt is on bowel regiment.   ABNL LAB/BG: K 3.3, unable to replace, MD aware. Encourage to eat high potassium foods (bananas).    DRAIN/DEVICES:PIV SL  SKIN: BLE patty, flaky with +2 edema. Edema wear in place. Wound cares completed per WOC RN recommendations.   D/C DATE: Pending, palliative consulted (Wed at 10 AM).   Discharge Barriers: LTC placement, SW is following  OTHER IMPORTANT INFO: replacing K continues to be a problem, MD aware.

## 2022-09-26 NOTE — PLAN OF CARE
Summary: CVA R MCA  DATE & TIME:  9/25/22 3050-1046  Cognitive Concerns/ Orientation : A&O x 2, disoriented to time and situation. Pt has very poor insight on her clinical situation. Overall pleasant overnight  BEHAVIOR & AGGRESSION TOOL COLOR: Green, anxious at times. Feels better when emotional support provided.   ABNL VS/O2: VSS on RA  MOBILITY: Total cares, lift for transfer, T&R Q2h (favors left side). Pulsate mattress, BLE elevated on pillows. Kicks pillows out underneath her legs/feet, pt educated on importance of elevated rickie LE.  PAIN MANAGEMENT: c/o of lower back pain r/t wound, repositioned for comfort.  DIET: Regular, very poor intake. Encouraged to take sips of water, pt needs to be reminded. Will take only sip at time. Offer banana for snacks.   BOWEL/BLADDER: Incontinent of B&B.  Purewick in use, 300 ml this shift. Pt was educated on importance of drinking fluids. No BM x1, pt is on bowel regiment.   ABNL LAB/BG: K 3.3, unable to replace, MD aware. Encourage to eat high potassium foods (bananas).    DRAIN/DEVICES:PIV SL  SKIN: BLE patty, flaky with +2 edema. Edema wear in place. Wound cares completed per WOC RN recommendations.   D/C DATE: Pending, palliative consulted (Wed at 10 AM).   Discharge Barriers: LTC placement, SW is following  OTHER IMPORTANT INFO: replacing K continues to be a problem, MD aware.

## 2022-09-27 NOTE — PLAN OF CARE
Goal Outcome Evaluation:  Alert, disoriented to time and situation. Calling out intermittently for her SO or for help. Improved strength on LUE and LLE. Up with lift to commode and to chair. Incontinent of small soft BM and of urine x1. Ate well for breakfast, poor appetite for lunch. VSS. Generalized pain, improved with repositioning. Turned q 2 hours. Dressings over back and BLE wounds CDI, dressing changes due tomorrow. Scoring green on aggression screening tool.

## 2022-09-27 NOTE — PROGRESS NOTES
[facility] Nurse Inpatient Assessment     Consulted for: Right lower back wound (Stage 3 HAPI) and Left medial thigh wound (stage 2)    Areas Assessed:      Areas visualized during today's visit: right skin fold, left medial thigh      Wound location: Right lower back in waist crease    9/13 9/27        Photo date: 9/27  Wound due to: Hospital Acquired Pressure injury stage 3   Stage: Unstageable 7/28, Following surgical debridement 7/30 Stage 3 Pressure Injury and Moisture Associated Skin Damage (MASD), suspect intertriginous pressure, appears to be deeper tissue damage within a crease, possible shear component from lift.    Wound history/plan of care:  Hypergranulation improving with small area of slough at lower edge.   Wound base:95 % red moist non granular tissue pale, 5% yellow adherent slough     Palpation of the wound bed: normal       Drainage: moderate      Description of drainage: serosanguinous, tan     Measurements (length x width x depth, in cm) 1.5 x 11 x 0.2cm      Tunneling N/A      Undermining NA  Periwound skin: necrotic tissue lifting, only one small (less than 0.5cm x 1cm) at Left lower edge of wound. Area of erythema/rash 2.5 x 3.5 x 0cm superior to wound, slighlty weepy tissue applied aquacel to absorb moisture.       Color: red blanchable erythema      Temperature: normal to warm and sweaty  Odor: none    Pain: none   Pain intervention: non  Treatment goal: Heal   STATUS: improving   Supplies ordered: at bedside     Pressure Injury Location: Left Medial thigh/groin (not assessed 9/27 pt in chair, nursing reports almost healed.)  Last photo: 9/16      Wound type: Pressure Injury     Pressure Injury Stage: Deep Tissue Pressure Injury (DTPI), hospital acquired, now HAPI stage 2 pressure injury device related purwick external catheter tubing (not the purwick device itself)   Wound history/plan of care:   Pt has had long (day 62 today 9-13-22) hospital course  "and unable to get up to the bathroom. Pt complaint of heavy legs and difficulty with moving independently.     Wound base: 100% pink moist tissue     Palpation of the wound bed: normal      Drainage: none     Description of drainage: none     Measurements (length x width x depth, in cm) now 2 separate area medial 0.4 x 1 x 0.1 and distal 0.5 x 1.4 x 0.1cm     Tunneling N/A     Undermining N/A  Periwound skin: Intact      Color: normal and consistent with surrounding tissue      Temperature: normal   Odor: none  Pain: moderate, tender  Pain intervention prior to dressing change: N/A  Treatment goal: Heal   STATUS: healing  Supplies ordered: at bedside and supplies stored on unit       Treatment Plan:     Posterior waist crease: Daily  1. Remove dressings and discard. Wet Aquacel AG to ease removal if dry still.   2. Cleanse wound and periwound skin with Vashe ( #[id number]) solution and 4x4\" gauze, pat dry   3. Apply 3M No Sting barrier wipe to periwound skin, let dry   4. Cut piece Aquacel AG to size of wound and apply to wound bed. Cut small piece of Aquacel AG to denuded tissue above main wound  5. Cover with Mepilex  6. Time/Date/Initial dressing change     Left Inner thigh/groin/Left breast: Every other day and PRN  1. Clean wound with saline or MicroKlenz Spray, pat dry  2. Wipe / \"clean\" the surrounding periwound tissue with skin prep (Cavilon No Sting Skin Prep #[id number]) and allow to dry. This will help protect periwound and help dressing adherence  3. Apply Triad paste to wound bed until tissue closes, then stop Triad and switch to InterDry AG. Don't use Triad and Interdry together as the paste can prevent InterDry from working.   to the area, making sure to conform nicely to skin curvatures.   4. Time and date dressing change  NOTE  -Reposition pt side to side ever when in bed, every 2 hours-get the pt way over on side to completely offload pressure. This will benefit skin and respiratory function   -Keep " heels elevated and floating on pillows at all times. Try using at least 2 pillows under each calf.  -When up to the chair pt needs to fully offload every 2 hours and use a chair cushion if needed       Orders: Updated    RECOMMEND PRIMARY TEAM ORDER: None, at this time  Education provided: importance of repositioning, plan of care, wound progress, Moisture management and Off-loading pressure  Discussed plan of care with: Patient and Nurse  WOC nurse follow-up plan: weekly  Notify WOC if wound(s) deteriorate.  Nursing to notify the Provider(s) and re-consult the WOC Nurse if new skin concern.    DATA:     Current support surface: Bariatric Low air loss mattress    Containment of urine/stool: Incontinence Protocol and Incontinent pad in bed  BMI: Body mass index is 45.82 kg/m .   Active diet order: Orders Placed This Encounter      Advance Diet as Tolerated      Regular Diet Adult Thin Liquids (level 0) (Upright position, alert, and assist as needed)     Output: No intake/output data recorded.     Labs:   No lab results found in last 7 days.    Invalid input(s): GLUCOMBO  Pressure injury risk assessment:   Sensory Perception: 3-->slightly limited  Moisture: 3-->occasionally moist  Activity: 2-->chairfast  Mobility: 2-->very limited  Nutrition: 1-->very poor  Friction and Shear: 1-->problem  Demetrio Score: 12    Telly Meyer RN [facility]   Dept. Pager: [phone]  Dept. Office Number: [phone]

## 2022-09-27 NOTE — PLAN OF CARE
Summary: CVA R MCA  DATE & TIME:  9/26/22-9/27/22 9085-2916  Cognitive Concerns/ Orientation: A&Ox2, disoriented to time and situation. Calls out frequently, educated on call light frequency and use.   BEHAVIOR & AGGRESSION TOOL COLOR: Green  ABNL VS/O2: VSS on RA  MOBILITY: Total cares, lift for transfer, T&R Q2h. Pulsate mattress, BLE elevated on pillows.   PAIN MANAGEMENT: Repositioned for comfort  DIET: Regular  BOWEL/BLADDER: Incontinent of B&B, no BM.   ABNL LAB/BG: N/A, on K+ protocol but pt refusing, MD aware  SKIN: BLE patty, flaky with +2 edema. Edema wear on bilateral LE  D/C DATE: Pending, palliative consult planned for 9/28 @ 1000.   Discharge Barriers: LTC placement, SW is following  OTHER IMPORTANT INFO:needs encouragement to eat and increase activity

## 2022-09-27 NOTE — PLAN OF CARE
Goal Outcome Evaluation:    Plan of Care Reviewed With: patient     Overall Patient Progress: no change    Outcome Evaluation: Intakes remain the same, typically 0-25%. No plan for nutrition support per the pt's and agent's request. Continue encouragement of PO. Noted wounds, WOCN note 9/22 with more details.

## 2022-09-27 NOTE — PLAN OF CARE
Goal Outcome Evaluation:  Cognitive Concerns/ Orientation: A&Ox 2, disoriented to time and situation. Frequently calls to have pillows and blankets adjusted. Boundaries outlined. Anxious at times.   BEHAVIOR & AGGRESSION TOOL COLOR: Green, anxious at times.    ABNL VS/O2: VSS on RA  MOBILITY: Total cares, lift for transfer, T&R Q2h. Pulsate mattress, BLE elevated on pillows. Up in the chair for less than one hour today-stated her legs hurt.  PAIN MANAGEMENT: repo for comfort  DIET: Regular, poor appetite  BOWEL/BLADDER: Incontinent of B&B.    ABNL LAB/BG: K 3.2-MD aware  SKIN: BLE patty, flaky with +2 edema. Edema wear on bilateral LE  D/C DATE: Pending, palliative consult planned for (Wed at 10 AM).   Discharge Barriers: LTC placement, SW is following  OTHER IMPORTANT INFO:needs encouragement to eat and increase activity

## 2022-09-27 NOTE — PLAN OF CARE
Goal Outcome Evaluation:  [id number]   A/OX2, VSS. Dreg to thigh, groin and CDI. Was on chair, up with lift. Incontinence of B/B. Turn and repo.  Edema wrap BLE, elevated on pillows. Discharge pending placement. Potassium protocol, recheck this evening is 3.5. Will continue to monitor.

## 2022-09-27 NOTE — PROGRESS NOTES
[facility]    Medicine Progress Note - Hospitalist Service    Date of Admission:  7/13/2022    Assessment & Plan      Cristy Bailey is a 77 year old female with a PMHx significant for morbid obesity, hypertension, and hyperlipidemia, who presented to [facility] 7/13/2022 with complete left-sided paralysis, left-sided facial droop, and difficulty speaking. CTA head remarkable for right MCA occlusion. She was given tenecteplase and subsequently transferred to [facility] 7/13/2022 for thrombectomy.   Hospital stay complicated by flank wound which required surgical debridement and placement of wound vac and findings LTC placement.     Acute R MCA ischemic stroke with edema and right to left midline shift  S/p tenecteplase and subsequent R MCA mechanical thrombectomy 7/13/22  * Presented to [facility] 7/13 with complete paralysis, left-sided facial droop, and aphasia. Code stroke initiated. Head CT 7/13 showed signs of an evolving R MCA infarct. CTA head 7/13 showed a right carotid terminus occlusion, right middle cerebral artery M1  segment occlusion with poor opacification of the more distal right MCA branches/poor collateral flow. CTA neck 7/13 negative for acute occlusions. Pt given tenecteplase 7/13 at 13:11. Noted to be agitated and was subsequently intubated given need for procedure.   * Subsequently transferred to [facility] 7/13/2022 where she underwent above procedure.   7/14: Extubated. Head CT 7/14 showed evolution of acute infarct involving the R MCA distribution with increased swelling, cortical effacement, and new mild right-to-left midline shift; questionable hypoattenuation involving the bilateral occipital lobes which could be artifactual.   * Additional stroke workup pursued this stay -- echo 7/14 showed EF 50%, grade 1 diastolic dysfunction   * Started ASA and atorvastatin per stroke team.   * Repeat head CT 7/15 showed evolving R MCA stroke with areas of edema,  overall stable.   plan  -- conts on full dose ASA and statin  -- goal SBP <140/90 and met   -- monitored on telemetry for first several wks of hospital stay without abnl rhythm so can likely defer prior recommendations for 30d cardiac event monitor at discharge  -- will need to follow up with [facility] in 6-8 wks (due beginning of September)  Waiting on placement     Severe malnutrition in context of acute illness and chronic disease  * Nutritionist following. Appetite poor this stay, needing encouragement to take po.   *there is some discussion about needing feeding support, but patient does not want to have a feeding tube.  * 70 lb weight loss in the last few months (likely lower in the context of diuresis for CHF and inherent error in measuring)  Plan  - encourage oral intake  - patient does not want a feeding tube at this point and her health care agent dulce maria agrees with this, consistent with previous wishes     Anxiety   Cognitive impairment, confusion as above.   * As stay has progressed, patient has consistently been oriented x2 (to self, location), unable to state the year. Also noted to have intermittent situational confusion. States family members have been visiting when they have not.   * Ongoing issues with anxiety this stay -- had been on regimen of Seroquel 12.5mg at dinner and 25mg HS with 12.5mg po q6h prn available. Mirtazapine 7.5mg on 8/15 evening given decreased appetite  * Psych consulted for assistance with ongoing mgmt -- seen on 8/16 and meds adjusted. Seroquel HS changed to Abilify 5mg HS. Recommended Zyptexa 5mg q6h prn for breakthrough insomnia/agitation while going off Seroquel. Consider uptitration of Abilify per psych recs. Advised uptitration of sertraline (25mg x3d then increase to 50mg daily beginning 8/20) and Remeron stopped. Also advised to liberalize Ativan to 0.5mg q6h prn.    * Seen by psych on 8/24, Abilify increased from 5mg to 10mg.  * PRN ativan stopped on 08/27/22 as leading to  increased confusion. Hydroxyzine stopped  as well.   -- cont current regimen of meds including Abilify 5mg every evening, Zyprexa 2.5mg HS and sertraline 50mg daily  -- cont prns per psych including Zyprexa 5mg q6h prn     Goals of care, failure to thrive  Discussed with patient and significant other/Health care agent Leo. She remains confused. He reiterated her wishes for DNR/DNI and no feeding tubes   We discussed concern about overall prognosis, with her eating 0-25% of her meals and nutrition recommending alternative forms of nutritions  She has lost 70 lbs since admit (some of that is probably related to diuresis and some related to variable weights in hospital, but still significant)  Overall, placement in a facility has been difficult, and there is concern about her continued deteriorating under these circumstances  Plan  - now DNR/DNI  - see discussion regarding weight loss above  Palliative care meeting with Leo 9/28.     Urinary retention: Resolved  * Retaining urine on 8/14. UA WNL, not worrisome for infection  * Requiring straight cath x1 on 8/20 PM  * Good UOP, no longer requiring straight cath. No evidence of obstruction on PVR.      Dysphagia due to CVA: Resolved  * Seen by SLP and noted with dysphagia. Initially required some intermittent fluid boluses.   * Was eventually able to advance to regular diet w/thin liquids     Dyslipidemia  * FLP this stay showed tot cholest 163, HDL 47, LDL 91, .   * Started on atorvastatin 40 mg daily     Volume overload dt diastolic CHF exacerbation: Improved  Essential hypertension  Hypokalemia, recurrent on lasix, and often refusing potassium   * Not on/needing meds PTA.   * As above, echo this stay showed EF 50% with grade I diastolic dysfunction; RV not well visualized but appeared mild-moderately dilated with global systolic function probably mildly reduced.   * BPs were initially soft this stay and required IVFs. BPs improved.   * Developed pedal edema  required IV Lasix. Ultimately transition to oral Lasix.  * Started on lisinopril this stay  * BPs improved during stay.   * Lasix decreased from 40mg in AM and 20mg in PM to 20mg BID on 9/3 and added spironolactone 25mg daily     -- reduced lisinopril dose to 5mg daily with parameters on 9/14, will hold lisinopril completely for now as she needs diuretics  -- cont Lasix 20mg po BID  -- reduced spironolactone to 12.5mg daily with parameters on 9/14  -- cont KCl 20mEq po daily  -- f/u bp and adjust closely as indicated     Hypophosphatemia: Resolved  Hypokalemia     Refusing replacement    Encourage bananas     Prolonged QTc   * EKG on 7/13 showed QTc 494.   * Repeat EKG on 7/30 (while on Levaquin) showed QTc table at 475. Has since completed course of abx as below.  * Had been monitoring on telemetry this stay. No concerning findings so tele was ultimately dc'd 8/19     Chronic bilateral LE edema with chronic venous stasis dermatitis and chronic skin changes  Hx of LLE cellulitis  * Was hospitalized in 11/2021 for sepsis dt to pneumonia and LLE cellulitis.   * During this stay, BLE noted to be patty and edematous, no unilateral leg swelling appreciated; LLE had patchy areas of erythema, no fluctuance, no painful areas with palpation; legs warm to touch. Lymphedema consulted.   -- cont LE elevation, lymphedema wraps     Lower back/R flank wound/abscess, suspect dt pressure type injury, s/p surgical debridement on 7/30/22 and placement of wound vac on 8/2/22,  OA  Hx of bilateral hip replacements   Hx of chronic neck and back pain  * Pt with significant back pain early on in hospital stay. Was requiring IV hydromorphone.  Dose had to be decreased dt somnolence.  * MRI L-spine in 2018 showed multilevel degenerative changes with mild central stenosis. Patient has chronic back pain, reports having it over the last 2 years.   * During this stay, patient was noted with 5-6cm by 2-3cm wound with swelling/fluid/blistering in  a fold of her lower back, did not appear infected; this seemed to be the source of her pain. Padded dressing placed and WOC RN ordered. Pain initially improved.   * Was placed on course of Augmentin on 7/24.   * On 7/26, was re-evaluated by WOC RN. Wound appeared more erythematous and purulence expressed. Worsening with shear stress from lift. Procal <0.05, WBC WNL. Abx changed to IV clindamycin.  * Wound cultures obtained. On 7/28, wound grew proteus and staph aureus (MRSA neg). US neg for abscess.   * Lost IV access on 7/28 so abx were changed to oral clindamycin and oral levaquin. IV access re-established but was continued on oral abx.  * General surgery consulted, ultimately underwent excisional debridement of R flank abscess in OR on 7/30. Intraop cultures showed polymicrobial growth with proteus mirabilis x2 strains (both pan-sensitive), corynebacterium striatum and enterococcus faecalis.  * Wound vac placed per general surgery on 8/2 >> subsequently removed during stay.  * ID consulted on 8/2 and abx narrowed to Augmentin alone, completed an additional 5 days of treatment on 8/7.      -- wound per WOC RN, follows weekly  -- prns available for pain  -- cont regular repositioning     Pressure Injury, left Medial thigh/groin  Pressure Injury Stage: Deep Tissue Pressure Injury (DTPI), hospital acquired, now HAPI stage 2 pressure injury device related purwick external catheter tubing (not the purwick device itself)  - Wound care following     Pressure injury bilateral buttocks  * Noted by nursing on 09/02/22. WOC RN following as above.      Suspected meningioma left parietal region, incidentally seen on admission CT on 7/13  * Head CT 7/13 showed a calcified extra-axial mass overlying the left parietal region measuring 1.4 cm, potentially representing a meningioma.  * Will need serial monitoring OP after discharge.      Anemia, suspect dilutional component: Resolved  * Hgb normal on admit. Hgb 11.5 on 7/16. No  overt clinical signs of major bleeding.  * Hgb now stable at 12-13      ntertriginous dermatitis  * Chronic and stable, cont clotrimazole powder     Constipation  * Continue sched bowel regimen     Morbid obesity  * BMI 59 on admission. Recommend aggressive dietary and lifestyle modifications as condition improves     Suspected sleep apnea  * O2 drop to mid 80s overnight 8/14. Briefly placed on 4LPM, which she then removed and then slept okay with sats in 90s thereafter.  * Recommend sleep study as outpatient     Severe malnutrition. In Context of:  Acute illness or injury  Chronic illness or disease  Malnutrition: (8/31)   % Weight Loss:  > 5% in 1 month (severe malnutrition) - significant loss over this admit  % Intake:  </= 50% for >/= 5 days (severe malnutrition) - consistent this admit x49 days  Subcutaneous Fat Loss:  Orbital region moderate depletion - visual, per 8/16 note  Muscle Loss:  Temporal region moderate depletion - visual, per 8/16 note  Fluid Retention:  Mild generalized edema  - dietitian following, recommend alternative feeding source? Not clear she would tolerate a tube feed with her confusion, will monitor, perhaps consider some carb counting     9/27- stable, palliative care meeting with Leo tomorrow.      Diet: Advance Diet as Tolerated  Regular Diet Adult Thin Liquids (level 0) (Upright position, alert, and assist as needed)  Room Service    DVT Prophylaxis: Enoxaparin (Lovenox) SQ  [place] Catheter: Not present  Central Lines: None  Cardiac Monitoring: None  Code Status: No CPR- Do NOT Intubate      Disposition Plan      Expected Discharge Date: 09/29/2022    Discharge Delays: Placement - LTC  Covid Vaccine for Placement  *Medically Ready for Discharge    Discharge Comments: Multiple referrerals outl  Did receive her 2nd covid shot.. Leo meeting with Palliative on the 28??        The patient's care was discussed with the Patient    Donal Rodriguez MD  Hospitalist Service  [facility]  [facility]  Securely message with the Vocera Web Console (learn more here)  Text page via [employer] Paging/Directory         Clinically Significant Risk Factors Present on Admission                      ______________________________________________________________________    Interval History   No pain or complaints     Data reviewed today: I reviewed all medications, new labs and imaging results over the last 24 hours. I personally reviewed no images or EKG's today.    Physical Exam   Vital Signs: Temp: 98.1  F (36.7  C) Temp src: Oral BP: 106/60 Pulse: 59   Resp: 20 SpO2: 94 % O2 Device: None (Room air)    Weight: 283 lbs 14.4 oz  Constitutional: awake, alert, cooperative, no apparent distress  Neurologic: Awake, alert  Neuropsychiatric: General: normal, calm and normal eye contact    Data   Recent Labs   Lab 09/26/22  0726 09/25/22  0532 09/24/22  2129 09/24/22  0739 09/23/22  0814 09/22/22  0822 09/21/22  1629   PLT  --   --  296  --   --  300 302   POTASSIUM 3.2* 3.3*  --  3.3*   < > 3.6  --     < > = values in this interval not displayed.     No results found for this or any previous visit (from the past 24 hour(s)).  Medications     - MEDICATION INSTRUCTIONS -         ARIPiprazole  10 mg Oral QPM     aspirin  325 mg Oral Daily     atorvastatin  40 mg Oral QPM     enoxaparin ANTICOAGULANT  40 mg Subcutaneous Q12H     furosemide  20 mg Oral BID     [Held by provider] lisinopril  5 mg Oral Daily     miconazole   Topical BID     multivitamins w/minerals  15 mL Oral Daily     OLANZapine  2.5 mg Oral At Bedtime     polyethylene glycol  17 g Oral Daily     potassium chloride  20 mEq Oral Daily     senna-docusate  1-2 tablet Oral BID     sertraline  50 mg Oral Daily     sodium chloride (PF)  3 mL Intracatheter Q8H     spironolactone  12.5 mg Oral Daily

## 2022-09-27 NOTE — PROGRESS NOTES
CLINICAL NUTRITION SERVICES - REASSESSMENT NOTE    Malnutrition:    % Weight Loss:  > 5% in 1 month (severe malnutrition) - significant loss over this admit  % Intake:  </= 50% for >/= 5 days (severe malnutrition) - consistent this admit x70 days  Subcutaneous Fat Loss:  Orbital region moderate depletion - visual, per  note  Muscle Loss:  Temporal region moderate depletion - visual, per  note  Fluid Retention:  Mild generalized edema     Malnutrition Diagnosis: Severe malnutrition  In Context of:  Acute illness or injury  Chronic illness or disease     EVALUATION OF PROGRESS TOWARD GOALS   Diet: Regular diet + Thins  Room service w/ assist  Intake/Tolerance:   - Seen in room this morning, breakfast tray had recently arrived. She wanted to eat the rice krispies w/ milk and bananas.   - continues to eat poorly overall. Noted no plan for FT per the pt's, and agent, desires.   - 0-25% of most meals, occasionally eats up to 50 or 75%.     - Labs: K 3.2 (L)  - Weight: 128.8 kg yesterday. Steady the past 8 days or so, down overall this admission w/ combo diuresis and dry mass loss (due to inadequate PO)  - Stoolin/26 - BM x1   - No BM   - BM x1    ASSESSED NUTRITION NEEDS:  Dosing Weight: 89.7 kg (adjusted)  Estimated Energy Needs: 6412-2718+ kcals (15-20+ Kcal/Kg)  Justification: maintenance r/t BMI, Wounds  Estimated Protein Needs: 108-135 grams protein (1.2-1.5 g pro/Kg)  Justification: wound healing, obesity guidelines, preservation of lean body mass and increased needs r/t age, Wounds   Estimated Fluid Needs: (1 mL/Kcal)  Justification: maintenance and per provider pending fluid status    NEW FINDINGS:   - Palliative care discussion planned for   - WOCN following for right lower back and left medial thigh wounds - see documentation from     Previous Goals:   Intake of at least 50% meals BID.   Evaluation: Not met    Previous Nutrition Diagnosis:   Inadequate oral intake related to  poor appetite, disinterest in eating, and increased protein needs for wound healing as evidenced by 0-50% of meals consistently over 70 day admission  Evaluation: No change    CURRENT NUTRITION DIAGNOSIS  Inadequate oral intake related to poor appetite, disinterest in eating, and increased protein needs for wound healing as evidenced by 0-50% of meals consistently over 76 day admission    INTERVENTIONS  Recommendations / Nutrition Prescription  - continue room service assistance  - continue encouragement of meals and micronutrients    - noted no plan for nutrition support.     Implementation  General encouragement     Goals  Intake of at least 50% meals BID.     MONITORING AND EVALUATION:  Progress towards goals will be monitored and evaluated per protocol and Practice Guidelines    Nicole Mitchell RD, LD  [facility], [facility], [facility]  Pager: [phone]  Weekend Pager: [phone]

## 2022-09-28 NOTE — TELEPHONE ENCOUNTER
[facility] needs verification the [facility] will follow patient for her home health needs. Form is urgent per cover sheet.    Placed in pcp basket.    Silvia LAO  [facility]   [facility]

## 2022-09-28 NOTE — PLAN OF CARE
"Summary: NANCYA R [facility]  DATE & TIME:  9/27/22 3094-7144  Cognitive Concerns/ Orientation: A&Ox1, disoriented to place, time and situation. Calls out frequently, educated on call light frequency and use. Anxious and tearful, states \"waiting for Leo and I want to leave\"  BEHAVIOR & AGGRESSION TOOL COLOR: Green  ABNL VS/O2: VSS on RA  MOBILITY: Total cares, lift for transfer, T&R Q2h. Pulsate mattress, BLE elevated on pillows.   PAIN MANAGEMENT: Repositioned for comfort  DIET: Regular  BOWEL/BLADDER: Incontinent of B&B, BM x1  ABNL LAB/BG: K 3.5  SKIN: BLE patty, flaky with +2 edema. Edema wear on bilateral LE  D/C DATE: Pending, palliative consult planned for 9/28 @ 1000.   Discharge Barriers: LTC placement, SW is following  OTHER IMPORTANT INFO:needs encouragement to eat and increase activity    "

## 2022-09-28 NOTE — PROGRESS NOTES
Care Management Follow Up  Additional Information:  Discussed with patient and s/o Leo regarding discharge disposition. Leo stated he would like to take patient to her home and care for her.Discussed with Leo difficulty with transfers and care for her however Leo states he has taken care of patient in the past and feels patient would improve significantly in her own environment. Patients appetite would improve and they would like to give a try. If patient fails he will return to the hospital.     Spoke with [facility] agency  [phone]. They have accepted patient for PT OT and HHA Face Sheet and orders faxed to [phone]    Length of Stay (days): 77    Expected Discharge Date: 09/30/2022     Concerns to be Addressed:       Patient plan of care discussed at interdisciplinary rounds: Yes    Anticipated Discharge Disposition: Home     Anticipated Discharge Services:  Home Care       Patient/family educated on Medicare website which has current facility and service quality ratings:  yes  Education Provided on the Discharge Plan:  Yes with s/o Loe  Patient/Family in Agreement with the Plan:  yes    Referrals Placed by CM/SW:  Home Care          Adan Almodovar RN CC

## 2022-09-28 NOTE — CONSULTS
[facility]  Palliative Care Consultation Note    Patient: Cristy Bailey  Date of Admission:  7/13/2022    Requesting Clinician / Team: Dr. Higginbotham/Hospitalist   Reason for consult: Goals of care    Recommendations:    I expressed concern to Shanthi and significant other, Leo, today regarding pt's protracted hospital stay, declining clinical status, severe malnutrition, and pressure injuries. I am worried that if Shanthi's nutrition status does not improve, she will continue to lose weight, get weaker, have worsening skin wounds, and will have further health setbacks including infections. I expressed worry that this situation may be slowly heading toward her end of life     Shanthi and Leo continue to be consistent in declining a feeding tube     I educated them on the role and recommendation for hospice support. At this time, they are not interested in hospice cares     Leo would like to bring Shanthi home and care for her there. I express worry that given her total cares/dependence on nursing staff, she is likely to deteriorate at home. Complications at home such as a fall or infection could lead to injury, harm, and recurrent health setbacks/rehospitalization, or even death. Both Leo and Shanthi are willing to accept this risk     Thus, will plan for a discharge home with folding in home care services. We again acknowledge the high likelihood for readmission and the possibility that hospice may be a reasonable option in the near future     These recommendations have been discussed with unit SW Mela, unit care coordinator Adan, and bedside RN.    ISRAEL Lloyd CNP  [facility]  Contact information available via [facility] Paging/Directory      Thank you for the opportunity to participate in the care of this patient and family. Our team: does not plan on following further, however do not hesitate to call or re-consult if we can be of further assistance to the  patient/family.     During regular M-F work hours ([id number]) -- if you are not sure who specifically to contact -- please contact us on [employer].     After regular work hours and on weekends/holidays, you can call our answering service at [phone].     Attestation:  Total time on the floor involved in the patient's care: 70 minutes  Total time spent in counseling/care coordination: >50% spent in counseling goals of care in setting of ischemic CVA, severe malnutrition, failure to thrive, pressure injuries, encephalopathy, anxiety, diastolic heart failure, wound abscess, sleep apnea     Assessments:  Cristy Bailey is a 77 year old female with PMH significant for morbid obesity, HTN, and HLD who presents with left sided paralysis, left facial droop and aphasia. She is found to have a right MCA acute ischemic stroke. She is s/p thrombectomy and tenecteplase 7/13. Her protracted hospital stay has been complicated by severe malnutrition with notable reduced appetite/PO intake (70lb weight loss since admission), encephalopathy/anxiety, failure to thrive with total dependence on nursing staff, volume overload related to diastolic HF exacerbation, lower back/right flank would/abscess due to pressure injury s/p surgical debridement 7/30 in addition to other pressure injuries to backside, and suspected sleep apnea.  has not been able to procure a safe discharge disposition to date.     Today, the patient was seen for:  Goals of care in setting of ischemic CVA, severe malnutrition, failure to thrive, pressure injuries, encephalopathy, anxiety, diastolic heart failure, wound abscess, sleep apnea     Visited with Shanthi, along with gordy Bailey this AM. Introduced myself and our services; assistance in pain and symptom management, emotional and spiritual support, and complex medical decision making.    Shanthi understands she is in the hospital due to a stroke. She is not aware that she has been hospitalized for  "nearly 3 months. She believes she is doing well, moving around efficiently, and eating well.     I express concern that she has had a very lengthy hospitalization with various setbacks/complications. We acknowledge her reduced PO intake/appetite with notable weight loss. We acknowledge how this affects recovery from her CVA (first 90 days being the most important for stroke recovery). We also note how this affects healing of her multiple skin wounds/pressure injuries. She is very distressed by this conversation and bargains/pleads to do her best eating, promising she will gain weight.     We discuss the option of supplementing her nutrition with a feeding tube. Both Shanthi and Leo are firmly and consistently opposed to this option.    I express worry that her current trajectory is demonstrating a slow decline and this very well could lead to further health setbacks, infections, complications, and her dying process.     We acknowledge her dependence on nursing staff for day to day cares/activities and likely need for nursing home placement long term/for the remainder of her life. She is very distressed by the prospect of living in a nursing home and pleads to go home.     Leo is open to caring for her at home, with the assistance of nursing staff.    We talk about folding in hospice support at home. Educated patient and sig other regarding hospice philosophy and prognostic criteria. Dispelled common myths. Discussed what hospice is (and is not), what services are usually provided (and those that are not, ex \"snf care\"), under what circumstances people tend to enroll, and the variety of places people can get hospice care.    At this time, Leo is not interested in hospice care. He wants to try to get her stronger at home. I express worry that given Shanthi's declining state, she is at risk for further weight loss and deterioration at home, which could lead to falls, injuries, complications, recurrent " hospitalizations, and even death. Both Shanthi and Leo are accepting of this risk, with the goal and hope of returning home. We acknowledge that hospice may be appropriate in the near future, if her health continues to deteriorate.     Prognosis, Goals, & Planning:      Functional Status just prior to hospitalization: 3 (Capable of only limited self-care; needs help with ADLs; in bed/chair >50% of waking hours)      Prognosis, Goals, and/or Advance Care Planning were addressed today: Yes      Patient's decision making preferences: shared with support from loved ones          Patient has decision-making capacity today for complex decisions: Partial (needs assistance with complex decisions)            I have concerns about the patient/family's health literacy today: No           Patient has a completed Health Care Directive: Yes, and on file.      Code status: No CPR / No Intubation    Coping, Meaning, & Spirituality:   Mood, coping, and/or meaning in the context of serious illness were addressed today: Yes    Social:     Living situation: Lives at home with sig opal Bailey    Yusuf family / caregivers: Supportive sig opal Bailey    History of Present Illness:  History gathered today from: patient, family/loved ones, medical chart, medical team members, unit team members, health care directive/s    Cristy Bailey is a 77 year old female with PMH significant for morbid obesity, HTN, and HLD who presents with left sided paralysis, left facial droop and aphasia. She is found to have a right MCA acute ischemic stroke. She is s/p thrombectomy and tenecteplase 7/13. Her protracted hospital stay has been complicated by severe malnutrition with notable reduced appetite/PO intake (70lb weight loss since admission), encephalopathy/anxiety, failure to thrive with total dependence on nursing staff, volume overload related to diastolic HF exacerbation, lower back/right flank would/abscess due to pressure injury s/p surgical  debridement 7/30 in addition to other pressure injuries to backside, and suspected sleep apnea.  has not been able to procure a safe discharge disposition to date.     Key Palliative Symptom Data:  We are not helping to manage these symptoms currently in this patient.    Patient is on opioids: bowels not assessed today.    ROS:  Comprehensive ROS is reviewed and is negative except as here & per HPI: N/A     Past Medical History:  Past Medical History:   Diagnosis Date     Arthritis     hip     HTN (hypertension) 1/2010      Leg weakness 3/24/2015     Lyme disease 1980'S AND 2004    lYME DZ LIKE SXS RESPONDED TO ABX        Past Surgical History:  Past Surgical History:   Procedure Laterality Date     ARTHROPLASTY HIP Right 7/30/2018    Procedure: ARTHROPLASTY HIP;  Right total hip arthroplasty;  Surgeon: Bry Garcia MD;  Location: [facility] OR     ARTHROPLASTY HIP Left 2/12/2019    Procedure: Left total hip arthroplasty ([employer] 60 mm acetabulum with 2 screws; #15 standard neck Synergy stem; +0 neck 36 mm head) (extra difficult case because of her high BMI and deep subcutaneous fatty layer resulting in the operative time at least twice as long from typical operative time);  Surgeon: Chavo Rodriguez MD;  Location: [facility] OR     BIOPSY OF UTERUS LINING  3/2010    negative.      COLONOSCOPY  2/21/10    benign findings. advised 10 yr f/u.      INCISION AND DRAINAGE HIP, COMBINED Right 8/29/2018    Procedure: COMBINED INCISION AND DRAINAGE HIP;  Incision and debridement, right hip ;  Surgeon: Bry Garcia MD;  Location: [facility] OR     IR CAROTID CEREBRAL ANGIOGRAM BILATERAL  7/13/2022     IRRIGATION AND DEBRIDEMENT DECUBITUS WOUND, COMBINED Right 7/30/2022    Procedure: IRRIGATION AND DEBRIDEMENT, PRESSURE ULCER on right flank;  Surgeon: Mariah Black MD;  Location:  OR     ORTHOPEDIC SURGERY Right 08/29/2018    Right hip I & D 8/26/18, Right JENAE, DOS 7/30/2018, Dr. Garcia. [facility]      ZZC C-SEC ONLY,PREV C-SEC      c-sec x 3          Family History:  Family History   Problem Relation Age of Onset     Cerebrovascular Disease Father         -stroke at age 80     Family History Negative Mother          Diedn her 80's of unknown causes.  Pt otherwise unaware of her health hx.      Unknown/Adopted Mother      Diabetes Brother         (Christian)  of DM complications at age 50.     Alcohol/Drug Brother         Christian     C.A.D. Brother         Christian.      Heart Disease Brother         Christian--MI age 50.     Family History Negative Brother      Family History Negative Brother      Family History Negative Sister      Family History Negative Sister      Family History Negative Son      Family History Negative Daughter      Family History Negative Daughter          Allergies:  Allergies   Allergen Reactions     No Known Drug Allergies         Medications:  I have reviewed this patient's medication profile and medications from this hospitalization.   Noted scheduled meds are:  Abilify   ASA  Lovenox   Lasix 20mg PO BID   Zyprexa 2.5mg PO at bedtime   Miralax daily   Senna 1-2 tabs PO BID   Zoloft 50mg PO daily   Spironolactone 12.5mg PO daily     Noted PRN meds are:  Zyprexa 5mg PO Q6hrs PRN agitation   Oxycodone 2.5-5mg PO Q3hrs PRN pain     Physical Exam:  Vital Signs: Temp: 97.3  F (36.3  C) Temp src: Oral BP: 110/67 Pulse: 79   Resp: 18 SpO2: 95 % O2 Device: None (Room air)    Weight: 283 lbs 14.4 oz  CONSTITUTIONAL: Chronically ill obese woman seen resting in bed in NAD, alert, oriented to situation, calm and cooperative yet intermittently distressed/tearful, redirects easily. Partner at bedside   HEENT: NCAT  RESPIRATORY: NL respiratory effort on RA    Data reviewed:  Recent imaging reviewed, my comments on pertinents:   Results for orders placed or performed during the hospital encounter of 22   IR Carotid Cerebral Angiogram Bilateral    Impression    Impression:  Successful  mechanical thrombectomy of right M1/carotid-T thrombus with  mTICI III recannulization.     Spencer Bonilla MD   Endovascular Surgical Neuroradiology Fellow  Pager: [phone].      I have reviewed all images and agree with the interpretation.  I also  attest that I was present for the entire procedure and agree with the  operative report.    I have personally reviewed the examination and initial interpretation  and I agree with the findings.    PERCY PENA MD         SYSTEM ID:  [id number]   CT Head w/o Contrast    Impression    IMPRESSION:  1.  Early ischemia right insular cortex and right temporal lobe consistent with right middle cerebral artery territory.  2.  No evidence of midline shift or hemorrhage.  3.  Stable diffuse age related changes.   CT Head w/o Contrast    Impression    IMPRESSION:   1. Evolution of acute infarct involving the right middle cerebral  artery distribution with increased swelling, cortical effacement, and  new mild right-to-left midline shift. Recommend close follow-up.  2. No evidence of hemorrhage.  3. Questionable hypoattenuation involving the bilateral occipital  lobes which could be artifactual (MRI could be performed).  4. Small incidental meningioma along the left parietal lobe.    BRYCE MCKEON MD         SYSTEM ID:  [id number]   CT Head w/o Contrast    Impression    IMPRESSION: Evolving right MCA territory infarcts primarily involving  the basal ganglia and right temporo-occipital lobes. There is regional  edema in the areas of infarct and slight mass effect on the right  lateral ventricle. These appear similar to head CT 7/14/2022. No  significant herniation. No hydrocephalus. No new intracranial  hemorrhage.      YOCASTA DENG MD         SYSTEM ID:  [id number]   US Abdomen Limited    Impression    IMPRESSION:  No abscess demonstrated.    NAHOMY SILVERMAN MD         SYSTEM ID:  [id number]   Echocardiogram Complete - For age > 60 yrs   Result Value Ref Range    LVEF  50%         Recent lab data reviewed, my comments on pertinents:   K 3.5  Creat 0.68

## 2022-09-28 NOTE — PLAN OF CARE
DATE & TIME: 9/27/22, 2300 - 9084   Cognitive Concerns/ Orientation : A&O x self only   BEHAVIOR & AGGRESSION TOOL COLOR: Green   ABNL VS/O2: Vital signs being done twice daily. Deferred overnight  MOBILITY: Total cares. Up with lift. Turned and repositioned assist x 2. On pulsate mattress.   PAIN MANAGMENT: Denied  DIET: Regular. Oral fluids encouraged overnight  BOWEL/BLADDER: Incontinent at times. Up to void in BSC. No BM this shift  ABNL LAB/BG: NA  DRAIN/DEVICES: PIV SL  TELEMETRY RHYTHM: NA  SKIN: Dressing to right flank/back remain CDI. Blanchable rednes to coccyx. +2 edema to BLE, patty. Edema wear in place to BLE. BLE elevated on pillows  TESTS/PROCEDURES: NA  D/C DATE: Pending.   Discharge Barriers: Placement  OTHER IMPORTANT INFO: SW following. Palliative consult planned for 9/28/22 at 1000.    Goal Outcome Evaluation:    Plan of Care Reviewed With: patient     Overall Patient Progress: no change

## 2022-09-28 NOTE — PROGRESS NOTES
Pt alert to self, Up x2 with lift. Up in chair x2. T&R. On pulsate mattress. VSS,RA. Denied pain. Denied SOB or coughing. R lower back dressing changed. Blanchable redness to coccyx and joesph area. Edema wear to BLE. Elevated legs. Poor appetite. Incontinent of B&B. BM x3.

## 2022-09-28 NOTE — PROGRESS NOTES
[facility]    Medicine Progress Note - Hospitalist Service    Date of Admission:  7/13/2022    Assessment & Plan      Cristy Bailey is a 77 year old female with a PMHx significant for morbid obesity, hypertension, and hyperlipidemia, who presented to [facility] 7/13/2022 with complete left-sided paralysis, left-sided facial droop, and difficulty speaking. CTA head remarkable for right MCA occlusion. She was given tenecteplase and subsequently transferred to [facility] 7/13/2022 for thrombectomy.   Hospital stay complicated by flank wound which required surgical debridement and placement of wound vac and findings LTC placement.     Acute R MCA ischemic stroke with edema and right to left midline shift  S/p tenecteplase and subsequent R MCA mechanical thrombectomy 7/13/22  * Presented to [facility] 7/13 with complete paralysis, left-sided facial droop, and aphasia. Code stroke initiated. Head CT 7/13 showed signs of an evolving R MCA infarct. CTA head 7/13 showed a right carotid terminus occlusion, right middle cerebral artery M1  segment occlusion with poor opacification of the more distal right MCA branches/poor collateral flow. CTA neck 7/13 negative for acute occlusions. Pt given tenecteplase 7/13 at 13:11. Noted to be agitated and was subsequently intubated given need for procedure.   * Subsequently transferred to [facility] 7/13/2022 where she underwent above procedure.   7/14: Extubated. Head CT 7/14 showed evolution of acute infarct involving the R MCA distribution with increased swelling, cortical effacement, and new mild right-to-left midline shift; questionable hypoattenuation involving the bilateral occipital lobes which could be artifactual.   * Additional stroke workup pursued this stay -- echo 7/14 showed EF 50%, grade 1 diastolic dysfunction   * Started ASA and atorvastatin per stroke team.   * Repeat head CT 7/15 showed evolving R MCA stroke with areas of edema,  overall stable.   plan  -- conts on full dose ASA and statin  -- goal SBP <140/90 and met   -- monitored on telemetry for first several wks of hospital stay without abnl rhythm so can likely defer prior recommendations for 30d cardiac event monitor at discharge  -- will need to follow up with [facility] in 6-8 wks (due beginning of September)  Waiting on placement     Severe malnutrition in context of acute illness and chronic disease  * Nutritionist following. Appetite poor this stay, needing encouragement to take po.   *there is some discussion about needing feeding support, but patient does not want to have a feeding tube.  * 70 lb weight loss in the last few months (likely lower in the context of diuresis for CHF and inherent error in measuring)  Plan  - encourage oral intake  - patient does not want a feeding tube at this point and her health care agent dulce maria agrees with this, consistent with previous wishes     Anxiety   Cognitive impairment, confusion as above.   * As stay has progressed, patient has consistently been oriented x2 (to self, location), unable to state the year. Also noted to have intermittent situational confusion. States family members have been visiting when they have not.   * Ongoing issues with anxiety this stay -- had been on regimen of Seroquel 12.5mg at dinner and 25mg HS with 12.5mg po q6h prn available. Mirtazapine 7.5mg on 8/15 evening given decreased appetite  * Psych consulted for assistance with ongoing mgmt -- seen on 8/16 and meds adjusted. Seroquel HS changed to Abilify 5mg HS. Recommended Zyptexa 5mg q6h prn for breakthrough insomnia/agitation while going off Seroquel. Consider uptitration of Abilify per psych recs. Advised uptitration of sertraline (25mg x3d then increase to 50mg daily beginning 8/20) and Remeron stopped. Also advised to liberalize Ativan to 0.5mg q6h prn.    * Seen by psych on 8/24, Abilify increased from 5mg to 10mg.  * PRN ativan stopped on 08/27/22 as leading to  increased confusion. Hydroxyzine stopped  as well.   -- cont current regimen of meds including Abilify 5mg every evening, Zyprexa 2.5mg HS and sertraline 50mg daily  -- cont prns per psych including Zyprexa 5mg q6h prn     Goals of care, failure to thrive  Discussed with patient and significant other/Health care agent Leo. She remains confused. He reiterated her wishes for DNR/DNI and no feeding tubes   We discussed concern about overall prognosis, with her eating 0-25% of her meals and nutrition recommending alternative forms of nutritions  She has lost 70 lbs since admit (some of that is probably related to diuresis and some related to variable weights in hospital, but still significant)  Overall, placement in a facility has been difficult, and there is concern about her continued deteriorating under these circumstances  Plan  - now DNR/DNI  - see discussion regarding weight loss above  Palliative care meeting with Leo 9/28.     Urinary retention: Resolved  * Retaining urine on 8/14. UA WNL, not worrisome for infection  * Requiring straight cath x1 on 8/20 PM  * Good UOP, no longer requiring straight cath. No evidence of obstruction on PVR.      Dysphagia due to CVA: Resolved  * Seen by SLP and noted with dysphagia. Initially required some intermittent fluid boluses.   * Was eventually able to advance to regular diet w/thin liquids     Dyslipidemia  * FLP this stay showed tot cholest 163, HDL 47, LDL 91, .   * Started on atorvastatin 40 mg daily     Volume overload dt diastolic CHF exacerbation: Improved  Essential hypertension  Hypokalemia, recurrent on lasix, and often refusing potassium   * Not on/needing meds PTA.   * As above, echo this stay showed EF 50% with grade I diastolic dysfunction; RV not well visualized but appeared mild-moderately dilated with global systolic function probably mildly reduced.   * BPs were initially soft this stay and required IVFs. BPs improved.   * Developed pedal edema  required IV Lasix. Ultimately transition to oral Lasix.  * Started on lisinopril this stay  * BPs improved during stay.   * Lasix decreased from 40mg in AM and 20mg in PM to 20mg BID on 9/3 and added spironolactone 25mg daily     -- reduced lisinopril dose to 5mg daily with parameters on 9/14, will hold lisinopril completely for now as she needs diuretics  -- cont Lasix 20mg po BID  -- reduced spironolactone to 12.5mg daily with parameters on 9/14  -- cont KCl 20mEq po daily  -- f/u bp and adjust closely as indicated     Hypophosphatemia: Resolved  Hypokalemia     Refusing replacement    Encourage bananas     Prolonged QTc   * EKG on 7/13 showed QTc 494.   * Repeat EKG on 7/30 (while on Levaquin) showed QTc table at 475. Has since completed course of abx as below.  * Had been monitoring on telemetry this stay. No concerning findings so tele was ultimately dc'd 8/19     Chronic bilateral LE edema with chronic venous stasis dermatitis and chronic skin changes  Hx of LLE cellulitis  * Was hospitalized in 11/2021 for sepsis dt to pneumonia and LLE cellulitis.   * During this stay, BLE noted to be patty and edematous, no unilateral leg swelling appreciated; LLE had patchy areas of erythema, no fluctuance, no painful areas with palpation; legs warm to touch. Lymphedema consulted.   -- cont LE elevation, lymphedema wraps     Lower back/R flank wound/abscess, suspect dt pressure type injury, s/p surgical debridement on 7/30/22 and placement of wound vac on 8/2/22,  OA  Hx of bilateral hip replacements   Hx of chronic neck and back pain  * Pt with significant back pain early on in hospital stay. Was requiring IV hydromorphone.  Dose had to be decreased dt somnolence.  * MRI L-spine in 2018 showed multilevel degenerative changes with mild central stenosis. Patient has chronic back pain, reports having it over the last 2 years.   * During this stay, patient was noted with 5-6cm by 2-3cm wound with swelling/fluid/blistering in  a fold of her lower back, did not appear infected; this seemed to be the source of her pain. Padded dressing placed and WOC RN ordered. Pain initially improved.   * Was placed on course of Augmentin on 7/24.   * On 7/26, was re-evaluated by WOC RN. Wound appeared more erythematous and purulence expressed. Worsening with shear stress from lift. Procal <0.05, WBC WNL. Abx changed to IV clindamycin.  * Wound cultures obtained. On 7/28, wound grew proteus and staph aureus (MRSA neg). US neg for abscess.   * Lost IV access on 7/28 so abx were changed to oral clindamycin and oral levaquin. IV access re-established but was continued on oral abx.  * General surgery consulted, ultimately underwent excisional debridement of R flank abscess in OR on 7/30. Intraop cultures showed polymicrobial growth with proteus mirabilis x2 strains (both pan-sensitive), corynebacterium striatum and enterococcus faecalis.  * Wound vac placed per general surgery on 8/2 >> subsequently removed during stay.  * ID consulted on 8/2 and abx narrowed to Augmentin alone, completed an additional 5 days of treatment on 8/7.      -- wound per WOC RN, follows weekly  -- prns available for pain  -- cont regular repositioning     Pressure Injury, left Medial thigh/groin  Pressure Injury Stage: Deep Tissue Pressure Injury (DTPI), hospital acquired, now HAPI stage 2 pressure injury device related purwick external catheter tubing (not the purwick device itself)  - Wound care following     Pressure injury bilateral buttocks  * Noted by nursing on 09/02/22. WOC RN following as above.      Suspected meningioma left parietal region, incidentally seen on admission CT on 7/13  * Head CT 7/13 showed a calcified extra-axial mass overlying the left parietal region measuring 1.4 cm, potentially representing a meningioma.  * Will need serial monitoring OP after discharge.      Anemia, suspect dilutional component: Resolved  * Hgb normal on admit. Hgb 11.5 on 7/16. No  overt clinical signs of major bleeding.  * Hgb now stable at 12-13      ntertriginous dermatitis  * Chronic and stable, cont clotrimazole powder     Constipation  * Continue sched bowel regimen     Morbid obesity  * BMI 59 on admission. Recommend aggressive dietary and lifestyle modifications as condition improves     Suspected sleep apnea  * O2 drop to mid 80s overnight 8/14. Briefly placed on 4LPM, which she then removed and then slept okay with sats in 90s thereafter.  * Recommend sleep study as outpatient     Severe malnutrition. In Context of:  Acute illness or injury  Chronic illness or disease  Malnutrition: (8/31)   % Weight Loss:  > 5% in 1 month (severe malnutrition) - significant loss over this admit  % Intake:  </= 50% for >/= 5 days (severe malnutrition) - consistent this admit x49 days  Subcutaneous Fat Loss:  Orbital region moderate depletion - visual, per 8/16 note  Muscle Loss:  Temporal region moderate depletion - visual, per 8/16 note  Fluid Retention:  Mild generalized edema  - dietitian following, recommend alternative feeding source? Not clear she would tolerate a tube feed with her confusion, will monitor, perhaps consider some carb counting     9/28- palliative care meeting with patient and SO today- await recommendations     Diet: Advance Diet as Tolerated  Regular Diet Adult Thin Liquids (level 0) (Upright position, alert, and assist as needed)  Room Service    DVT Prophylaxis: Enoxaparin (Lovenox) SQ  [place] Catheter: Not present  Central Lines: None  Cardiac Monitoring: None  Code Status: No CPR- Do NOT Intubate      Disposition Plan      Expected Discharge Date: 09/30/2022    Discharge Delays: Placement - LTC  Covid Vaccine for Placement  *Medically Ready for Discharge    Discharge Comments: Multiple referrerals outl  Did receive her 2nd covid shot.. Leo meeting with Palliative on the 28??        The patient's care was discussed with the Patient    Donal Rodriguez MD  Hospitalist  [facility]  Securely message with the [employer] Web Console (learn more here)  Text page via [employer] Paging/Directory         Clinically Significant Risk Factors Present on Admission                      ______________________________________________________________________    Interval History   No pain or complaints     Data reviewed today: I reviewed all medications, new labs and imaging results over the last 24 hours. I personally reviewed no images or EKG's today.    Physical Exam   Vital Signs: Temp: 97.3  F (36.3  C) Temp src: Oral BP: 110/67 Pulse: 79   Resp: 18 SpO2: 95 % O2 Device: None (Room air)    Weight: 283 lbs 14.4 oz  Constitutional: awake, alert, cooperative, no apparent distress  Neurologic: Awake, alert  Neuropsychiatric: General: normal, calm and normal eye contact    Data   Recent Labs   Lab 09/27/22  1553 09/26/22  0726 09/25/22  0532 09/24/22  2129 09/23/22  0814 09/22/22  0822     --   --  296  --  300   POTASSIUM 3.5 3.2* 3.3*  --    < > 3.6    < > = values in this interval not displayed.     No results found for this or any previous visit (from the past 24 hour(s)).  Medications     - MEDICATION INSTRUCTIONS -         ARIPiprazole  10 mg Oral QPM     aspirin  325 mg Oral Daily     atorvastatin  40 mg Oral QPM     enoxaparin ANTICOAGULANT  40 mg Subcutaneous Q12H     furosemide  20 mg Oral BID     [Held by provider] lisinopril  5 mg Oral Daily     miconazole   Topical BID     multivitamins w/minerals  15 mL Oral Daily     OLANZapine  2.5 mg Oral At Bedtime     polyethylene glycol  17 g Oral Daily     potassium chloride  20 mEq Oral Daily     senna-docusate  1-2 tablet Oral BID     sertraline  50 mg Oral Daily     sodium chloride (PF)  3 mL Intracatheter Q8H     spironolactone  12.5 mg Oral Daily

## 2022-09-29 NOTE — TELEPHONE ENCOUNTER
Spoke with Melisa and informed her that Kul[facility] would follow Cristy's care.    Silvia LAO  [facility]   [facility]

## 2022-09-29 NOTE — PROGRESS NOTES
[facility]    Medicine Progress Note - Hospitalist Service    Date of Admission:  7/13/2022    Assessment & Plan      Cristy Bailey is a 77 year old female with a PMHx significant for morbid obesity, hypertension, and hyperlipidemia, who presented to [facility] 7/13/2022 with complete left-sided paralysis, left-sided facial droop, and difficulty speaking. CTA head remarkable for right MCA occlusion. She was given tenecteplase and subsequently transferred to [facility] 7/13/2022 for thrombectomy.   Hospital stay complicated by flank wound which required surgical debridement and placement of wound vac and findings LTC placement.     Acute R MCA ischemic stroke with edema and right to left midline shift  S/p tenecteplase and subsequent R MCA mechanical thrombectomy 7/13/22  * Presented to [facility] 7/13 with complete paralysis, left-sided facial droop, and aphasia. Code stroke initiated. Head CT 7/13 showed signs of an evolving R MCA infarct. CTA head 7/13 showed a right carotid terminus occlusion, right middle cerebral artery M1  segment occlusion with poor opacification of the more distal right MCA branches/poor collateral flow. CTA neck 7/13 negative for acute occlusions. Pt given tenecteplase 7/13 at 13:11. Noted to be agitated and was subsequently intubated given need for procedure.   * Subsequently transferred to [facility] 7/13/2022 where she underwent above procedure.   7/14: Extubated. Head CT 7/14 showed evolution of acute infarct involving the R MCA distribution with increased swelling, cortical effacement, and new mild right-to-left midline shift; questionable hypoattenuation involving the bilateral occipital lobes which could be artifactual.   * Additional stroke workup pursued this stay -- echo 7/14 showed EF 50%, grade 1 diastolic dysfunction   * Started ASA and atorvastatin per stroke team.   * Repeat head CT 7/15 showed evolving R MCA stroke with areas of edema,  overall stable.   plan  -- conts on full dose ASA and statin  -- goal SBP <140/90 and met   -- monitored on telemetry for first several wks of hospital stay without abnl rhythm so can likely defer prior recommendations for 30d cardiac event monitor at discharge  -- will need to follow up with [facility] in 6-8 wks (due beginning of September)  Waiting on placement     Severe malnutrition in context of acute illness and chronic disease  * Nutritionist following. Appetite poor this stay, needing encouragement to take po.   *there is some discussion about needing feeding support, but patient does not want to have a feeding tube.  * 70 lb weight loss in the last few months (likely lower in the context of diuresis for CHF and inherent error in measuring)  Plan  - encourage oral intake  - patient does not want a feeding tube at this point and her health care agent dulce maria agrees with this, consistent with previous wishes     Anxiety   Cognitive impairment, confusion as above.   * As stay has progressed, patient has consistently been oriented x2 (to self, location), unable to state the year. Also noted to have intermittent situational confusion. States family members have been visiting when they have not.   * Ongoing issues with anxiety this stay -- had been on regimen of Seroquel 12.5mg at dinner and 25mg HS with 12.5mg po q6h prn available. Mirtazapine 7.5mg on 8/15 evening given decreased appetite  * Psych consulted for assistance with ongoing mgmt -- seen on 8/16 and meds adjusted. Seroquel HS changed to Abilify 5mg HS. Recommended Zyptexa 5mg q6h prn for breakthrough insomnia/agitation while going off Seroquel. Consider uptitration of Abilify per psych recs. Advised uptitration of sertraline (25mg x3d then increase to 50mg daily beginning 8/20) and Remeron stopped. Also advised to liberalize Ativan to 0.5mg q6h prn.    * Seen by psych on 8/24, Abilify increased from 5mg to 10mg.  * PRN ativan stopped on 08/27/22 as leading to  increased confusion. Hydroxyzine stopped  as well.   -- cont current regimen of meds including Abilify 5mg every evening, Zyprexa 2.5mg HS and sertraline 50mg daily  -- cont prns per psych including Zyprexa 5mg q6h prn     Goals of care, failure to thrive  Discussed with patient and significant other/Health care agent Leo. She remains confused. He reiterated her wishes for DNR/DNI and no feeding tubes   We discussed concern about overall prognosis, with her eating 0-25% of her meals and nutrition recommending alternative forms of nutritions  She has lost 70 lbs since admit (some of that is probably related to diuresis and some related to variable weights in hospital, but still significant)  Overall, placement in a facility has been difficult, and there is concern about her continued deteriorating under these circumstances  Plan  - now DNR/DNI  - see discussion regarding weight loss above  Palliative care meeting with Leo 9/28.     Urinary retention: Resolved  * Retaining urine on 8/14. UA WNL, not worrisome for infection  * Requiring straight cath x1 on 8/20 PM  * Good UOP, no longer requiring straight cath. No evidence of obstruction on PVR.      Dysphagia due to CVA: Resolved  * Seen by SLP and noted with dysphagia. Initially required some intermittent fluid boluses.   * Was eventually able to advance to regular diet w/thin liquids     Dyslipidemia  * FLP this stay showed tot cholest 163, HDL 47, LDL 91, .   * Started on atorvastatin 40 mg daily     Volume overload dt diastolic CHF exacerbation: Improved  Essential hypertension  Hypokalemia, recurrent on lasix, and often refusing potassium   * Not on/needing meds PTA.   * As above, echo this stay showed EF 50% with grade I diastolic dysfunction; RV not well visualized but appeared mild-moderately dilated with global systolic function probably mildly reduced.   * BPs were initially soft this stay and required IVFs. BPs improved.   * Developed pedal edema  required IV Lasix. Ultimately transition to oral Lasix.  * Started on lisinopril this stay  * BPs improved during stay.   * Lasix decreased from 40mg in AM and 20mg in PM to 20mg BID on 9/3 and added spironolactone 25mg daily     -- reduced lisinopril dose to 5mg daily with parameters on 9/14, will hold lisinopril completely for now as she needs diuretics  -- cont Lasix 20mg po BID  -- reduced spironolactone to 12.5mg daily with parameters on 9/14  -- cont KCl 20mEq po daily  -- f/u bp and adjust closely as indicated     Hypophosphatemia: Resolved  Hypokalemia     Refusing replacement    Encourage bananas     Prolonged QTc   * EKG on 7/13 showed QTc 494.   * Repeat EKG on 7/30 (while on Levaquin) showed QTc table at 475. Has since completed course of abx as below.  * Had been monitoring on telemetry this stay. No concerning findings so tele was ultimately dc'd 8/19     Chronic bilateral LE edema with chronic venous stasis dermatitis and chronic skin changes  Hx of LLE cellulitis  * Was hospitalized in 11/2021 for sepsis dt to pneumonia and LLE cellulitis.   * During this stay, BLE noted to be patty and edematous, no unilateral leg swelling appreciated; LLE had patchy areas of erythema, no fluctuance, no painful areas with palpation; legs warm to touch. Lymphedema consulted.   -- cont LE elevation, lymphedema wraps     Lower back/R flank wound/abscess, suspect dt pressure type injury, s/p surgical debridement on 7/30/22 and placement of wound vac on 8/2/22,  OA  Hx of bilateral hip replacements   Hx of chronic neck and back pain  * Pt with significant back pain early on in hospital stay. Was requiring IV hydromorphone.  Dose had to be decreased dt somnolence.  * MRI L-spine in 2018 showed multilevel degenerative changes with mild central stenosis. Patient has chronic back pain, reports having it over the last 2 years.   * During this stay, patient was noted with 5-6cm by 2-3cm wound with swelling/fluid/blistering in  a fold of her lower back, did not appear infected; this seemed to be the source of her pain. Padded dressing placed and WOC RN ordered. Pain initially improved.   * Was placed on course of Augmentin on 7/24.   * On 7/26, was re-evaluated by WOC RN. Wound appeared more erythematous and purulence expressed. Worsening with shear stress from lift. Procal <0.05, WBC WNL. Abx changed to IV clindamycin.  * Wound cultures obtained. On 7/28, wound grew proteus and staph aureus (MRSA neg). US neg for abscess.   * Lost IV access on 7/28 so abx were changed to oral clindamycin and oral levaquin. IV access re-established but was continued on oral abx.  * General surgery consulted, ultimately underwent excisional debridement of R flank abscess in OR on 7/30. Intraop cultures showed polymicrobial growth with proteus mirabilis x2 strains (both pan-sensitive), corynebacterium striatum and enterococcus faecalis.  * Wound vac placed per general surgery on 8/2 >> subsequently removed during stay.  * ID consulted on 8/2 and abx narrowed to Augmentin alone, completed an additional 5 days of treatment on 8/7.      -- wound per WOC RN, follows weekly  -- prns available for pain  -- cont regular repositioning     Pressure Injury, left Medial thigh/groin  Pressure Injury Stage: Deep Tissue Pressure Injury (DTPI), hospital acquired, now HAPI stage 2 pressure injury device related purwick external catheter tubing (not the purwick device itself)  - Wound care following     Pressure injury bilateral buttocks  * Noted by nursing on 09/02/22. WOC RN following as above.      Suspected meningioma left parietal region, incidentally seen on admission CT on 7/13  * Head CT 7/13 showed a calcified extra-axial mass overlying the left parietal region measuring 1.4 cm, potentially representing a meningioma.  * Will need serial monitoring OP after discharge.      Anemia, suspect dilutional component: Resolved  * Hgb normal on admit. Hgb 11.5 on 7/16. No  overt clinical signs of major bleeding.  * Hgb now stable at 12-13      ntertriginous dermatitis  * Chronic and stable, cont clotrimazole powder     Constipation  * Continue sched bowel regimen     Morbid obesity  * BMI 59 on admission. Recommend aggressive dietary and lifestyle modifications as condition improves     Suspected sleep apnea  * O2 drop to mid 80s overnight 8/14. Briefly placed on 4LPM, which she then removed and then slept okay with sats in 90s thereafter.  * Recommend sleep study as outpatient     Severe malnutrition. In Context of:  Acute illness or injury  Chronic illness or disease  Malnutrition: (8/31)   % Weight Loss:  > 5% in 1 month (severe malnutrition) - significant loss over this admit  % Intake:  </= 50% for >/= 5 days (severe malnutrition) - consistent this admit x49 days  Subcutaneous Fat Loss:  Orbital region moderate depletion - visual, per 8/16 note  Muscle Loss:  Temporal region moderate depletion - visual, per 8/16 note  Fluid Retention:  Mild generalized edema  - dietitian following, recommend alternative feeding source? Not clear she would tolerate a tube feed with her confusion, will monitor, perhaps consider some carb counting          Diet: Advance Diet as Tolerated  Regular Diet Adult Thin Liquids (level 0) (Upright position, alert, and assist as needed)  Room Service    DVT Prophylaxis: Enoxaparin (Lovenox) SQ  [place] Catheter: Not present  Central Lines: None  Cardiac Monitoring: None  Code Status: No CPR- Do NOT Intubate      Disposition Plan      Expected Discharge Date: 09/30/2022    Discharge Delays: Placement - LTC  Covid Vaccine for Placement  *Medically Ready for Discharge    Discharge Comments: Multiple referrerals outl  Did receive her 2nd covid shot.. Leo meeting with Palliative on the 28??        The patient's care was discussed with the Patient    Hamilton Higginbotham DO  Hospitalist Service    Securely message with the [facility]  Web Console (learn more here)  Text page via [facility] Paging/Directory         Clinically Significant Risk Factors Present on Admission                      ______________________________________________________________________    Interval History   No pain or complaints. Apparently per palliative plan was to go home, but then partner later said he could not take her back. Lives in mobile home and cannot get a stretcher inside    Data reviewed today: I reviewed all medications, new labs and imaging results over the last 24 hours. I personally reviewed no images or EKG's today.    Physical Exam   Vital Signs: Temp: (P) 97.8  F (36.6  C) Temp src: (P) Oral BP: (P) 98/56 Pulse: (P) 70   Resp: (P) 17 SpO2: (P) 94 % O2 Device: (P) None (Room air)    Weight: 285 lbs 0 oz  Constitutional: awake, alert, cooperative, no apparent distress  Neurologic: Awake, alert  Neuropsychiatric: General: normal, calm and normal eye contact    Data   Recent Labs   Lab 09/29/22  0855 09/27/22  1553 09/26/22  0726 09/25/22  0532 09/24/22  2129   PLT  --  291  --   --  296   POTASSIUM 3.2* 3.5 3.2*   < >  --     < > = values in this interval not displayed.     No results found for this or any previous visit (from the past 24 hour(s)).  Medications     - MEDICATION INSTRUCTIONS -         ARIPiprazole  10 mg Oral QPM     aspirin  325 mg Oral Daily     atorvastatin  40 mg Oral QPM     enoxaparin ANTICOAGULANT  40 mg Subcutaneous Q12H     furosemide  20 mg Oral BID     [Held by provider] lisinopril  5 mg Oral Daily     miconazole   Topical BID     multivitamins w/minerals  15 mL Oral Daily     OLANZapine  2.5 mg Oral At Bedtime     polyethylene glycol  17 g Oral Daily     potassium chloride  20 mEq Oral Daily     senna-docusate  1-2 tablet Oral BID     sertraline  50 mg Oral Daily     sodium chloride (PF)  3 mL Intracatheter Q8H     spironolactone  12.5 mg Oral Daily

## 2022-09-29 NOTE — PROGRESS NOTES
Care Management Follow Up    After much discussion with patient and significant other Leo regarding discharge to home and the barriers they will encounter as s/o will be the only caregiver for patient and patient is requiring assistance of 2 with a ceiling lift,patient has now changed her mind and decided she will try TCU to gain strength and eventually discharge to home with s/o Leo  to care for patient.   Patient at baseline prior to hospital stay was assistance of 1 person and a chair lift. Leo stated patient was about  350 lbs but able to move her legs and had good upper arm strength to move around. Leo feels he will be able to manage patient at home if she gains her strength back.   Would prefer therapy working with patient daily to improve mobility.        Adan Almodovar RN CC

## 2022-09-29 NOTE — PLAN OF CARE
Summary: CVA R MCA  DATE & TIME: 9/28/22 1900- 9/29/22 8630  Cognitive Concerns/ Orientation : A&O x self only   BEHAVIOR & AGGRESSION TOOL COLOR: Green     ABNL VS/O2: VSS RA  MOBILITY: Total cares. Up with lift. Turned and repositioned assist x 2. On pulsate mattress.   PAIN MANAGMENT: Denied  DIET: Regular. Oral fluids encouraged   BOWEL/BLADDER: Incontinent at times. Voided on bedpan, also up to void on BSC. No BM this shift  ABNL LAB/BG: NA  DRAIN/DEVICES: PIV SL  TELEMETRY RHYTHM: NA  SKIN: Dressing to right flank/back remain CDI. Blanchable rednes to coccyx. +2 edema to BLE, patty. Edema wear in place to BLE. BLE elevated on pillows  TESTS/PROCEDURES: NA  D/C DATE: Pending.   Discharge Barriers: Placement  OTHER IMPORTANT INFO: SW following.

## 2022-09-30 NOTE — PLAN OF CARE
Goal Outcome Evaluation:        Summary: CVA R MCA  DATE & TIME: 9/29/22-9/30/22 [id number]  Cognitive Concerns/ Orientation : A&O x self only; reorientation provided.   BEHAVIOR & AGGRESSION TOOL COLOR: Green     ABNL VSS on RA.  MOBILITY: Up with assist of 2/lift. Turned and repositioned; on pulsate mattress.   PAIN MANAGMENT: Denied  DIET: Regular. Poor appetite; Oral fluids encouraged   BOWEL/BLADDER: Incontinent at times. At times voids on bedpan, also up to BSC. No BM this shift  ABNL LAB/BG: K+ 3.8 recheck in AM   DRAIN/DEVICES: PIV SL  TELEMETRY RHYTHM: NA  SKIN: Dressing to right flank/back remain CDI; due to be changed 9/30. Blanchable rednes to coccyx/buttocks. Mild redness under skin folds. +2 edema to BLEs, patty. Edema-wear in place to BLEs. Legs elevated on pillows  TESTS/PROCEDURES: NA  D/C DATE: Pending placement.   Discharge Barriers: Placement  OTHER IMPORTANT INFO: SW following.                [FreeTextEntry1] : 48 year old man seen 02/09/2021 with complaint of frequency, urgency, hesitancy, weak stream, straining to void, sensation of incomplete emptying and nocturia 2-3x/night. For BPH, he is on nothing. This began months ago. Nocturia is most bothersome symptom.  \par It is moderate in severity. Nothing makes the symptoms better, nothing makes sx worse. \par It is associated with nothing.\par No hematuria, no dysuria, no frequency, no urgency, no hesitancy, no straining. No incontinence. \par No fevers, no chills, no nausea, no vomiting, no flank pain. Of note, he also reports ED and low testoterone. He is being worked up by endocrinologist for low T. PVR 12 mL.\par \par No family history contributory to urinary frequency or nocturia.

## 2022-09-30 NOTE — PLAN OF CARE
Summary: CVA R MCA  DATE & TIME: 9/29/22; [id number]  Cognitive Concerns/ Orientation : A&O x self only; reorientation provided.   BEHAVIOR & AGGRESSION TOOL COLOR: Green     ABNL VSS except for soft BP in afternoon. On RA.  MOBILITY: Up with assist of 2/lift. Turned and repositioned; on pulsate mattress. Up to a recliner chair today x1.  PAIN MANAGMENT: Denied  DIET: Regular. Poor appetite; Oral fluids encouraged   BOWEL/BLADDER: Incontinent at times. At times voids on bedpan, also up to BSC. No BM this shift  ABNL LAB/BG: K+ 3.2; replaced; recheck pending this evening.   DRAIN/DEVICES: PIV SL  TELEMETRY RHYTHM: NA  SKIN: Dressing to right flank/back remain CDI; due to be changed 9/30. Blanchable reddnes to coccyx/buttocks. Mild redness under skin folds. +2 edema to BLEs, patty. Edema-wear in place to BLEs. Legs elevated on pillows  TESTS/PROCEDURES: NA  D/C DATE: Pending placement.   Discharge Barriers: Placement  OTHER IMPORTANT INFO: SW following.

## 2022-09-30 NOTE — PROGRESS NOTES
[facility]    Medicine Progress Note - Hospitalist Service    Date of Admission:  7/13/2022    Assessment & Plan      Cristy Bailey is a 77 year old female with a PMHx significant for morbid obesity, hypertension, and hyperlipidemia, who presented to [facility] 7/13/2022 with complete left-sided paralysis, left-sided facial droop, and difficulty speaking. CTA head remarkable for right MCA occlusion. She was given tenecteplase and subsequently transferred to [facility] 7/13/2022 for thrombectomy.   Hospital stay complicated by flank wound which required surgical debridement and placement of wound vac and findings LTC placement.     Acute R MCA ischemic stroke with edema and right to left midline shift  S/p tenecteplase and subsequent R MCA mechanical thrombectomy 7/13/22  * Presented to [facility] 7/13 with complete paralysis, left-sided facial droop, and aphasia. Code stroke initiated. Head CT 7/13 showed signs of an evolving R MCA infarct. CTA head 7/13 showed a right carotid terminus occlusion, right middle cerebral artery M1  segment occlusion with poor opacification of the more distal right MCA branches/poor collateral flow. CTA neck 7/13 negative for acute occlusions. Pt given tenecteplase 7/13 at 13:11. Noted to be agitated and was subsequently intubated given need for procedure.   * Subsequently transferred to [facility] 7/13/2022 where she underwent above procedure.   7/14: Extubated. Head CT 7/14 showed evolution of acute infarct involving the R MCA distribution with increased swelling, cortical effacement, and new mild right-to-left midline shift; questionable hypoattenuation involving the bilateral occipital lobes which could be artifactual.   * Additional stroke workup pursued this stay -- echo 7/14 showed EF 50%, grade 1 diastolic dysfunction   * Started ASA and atorvastatin per stroke team.   * Repeat head CT 7/15 showed evolving R MCA stroke with areas of edema,  overall stable.   plan  -- conts on full dose ASA and statin  -- goal SBP <140/90 and met   -- monitored on telemetry for first several wks of hospital stay without abnl rhythm so can likely defer prior recommendations for 30d cardiac event monitor at discharge  -- will need to follow up with [facility] in 6-8 wks (due beginning of September)  Waiting on placement     Severe malnutrition in context of acute illness and chronic disease  * Nutritionist following. Appetite poor this stay, needing encouragement to take po.   *there is some discussion about needing feeding support, but patient does not want to have a feeding tube.  * 70 lb weight loss in the last few months (likely lower in the context of diuresis for CHF and inherent error in measuring)  Plan  - encourage oral intake  - patient does not want a feeding tube at this point and her health care agent dulce maria agrees with this, consistent with previous wishes     Anxiety   Cognitive impairment, confusion as above.   * As stay has progressed, patient has consistently been oriented x2 (to self, location), unable to state the year. Also noted to have intermittent situational confusion. States family members have been visiting when they have not.   * Ongoing issues with anxiety this stay -- had been on regimen of Seroquel 12.5mg at dinner and 25mg HS with 12.5mg po q6h prn available. Mirtazapine 7.5mg on 8/15 evening given decreased appetite  * Psych consulted for assistance with ongoing mgmt -- seen on 8/16 and meds adjusted. Seroquel HS changed to Abilify 5mg HS. Recommended Zyptexa 5mg q6h prn for breakthrough insomnia/agitation while going off Seroquel. Consider uptitration of Abilify per psych recs. Advised uptitration of sertraline (25mg x3d then increase to 50mg daily beginning 8/20) and Remeron stopped. Also advised to liberalize Ativan to 0.5mg q6h prn.    * Seen by psych on 8/24, Abilify increased from 5mg to 10mg.  * PRN ativan stopped on 08/27/22 as leading to  increased confusion. Hydroxyzine stopped  as well.   -- cont current regimen of meds including Abilify 5mg every evening, Zyprexa 2.5mg HS and sertraline 50mg daily  -- cont prns per psych including Zyprexa 5mg q6h prn     Goals of care, failure to thrive  Discussed with patient and significant other/Health care agent Leo. She remains confused. He reiterated her wishes for DNR/DNI and no feeding tubes   We discussed concern about overall prognosis, with her eating 0-25% of her meals and nutrition recommending alternative forms of nutritions  She has lost 70 lbs since admit (some of that is probably related to diuresis and some related to variable weights in hospital, but still significant)  Overall, placement in a facility has been difficult, and there is concern about her continued deteriorating under these circumstances  Plan  - now DNR/DNI  - see discussion regarding weight loss above  Palliative care meeting with Leo 9/28.     Urinary retention: Resolved  * Retaining urine on 8/14. UA WNL, not worrisome for infection  * Requiring straight cath x1 on 8/20 PM  * Good UOP, no longer requiring straight cath. No evidence of obstruction on PVR.      Dysphagia due to CVA: Resolved  * Seen by SLP and noted with dysphagia. Initially required some intermittent fluid boluses.   * Was eventually able to advance to regular diet w/thin liquids     Dyslipidemia  * FLP this stay showed tot cholest 163, HDL 47, LDL 91, .   * Started on atorvastatin 40 mg daily     Volume overload dt diastolic CHF exacerbation: Improved  Essential hypertension  Hypokalemia, recurrent on lasix, and often refusing potassium   * Not on/needing meds PTA.   * As above, echo this stay showed EF 50% with grade I diastolic dysfunction; RV not well visualized but appeared mild-moderately dilated with global systolic function probably mildly reduced.   * BPs were initially soft this stay and required IVFs. BPs improved.   * Developed pedal edema  required IV Lasix. Ultimately transition to oral Lasix.  * Started on lisinopril this stay  * BPs improved during stay.   * Lasix decreased from 40mg in AM and 20mg in PM to 20mg BID on 9/3 and added spironolactone 25mg daily     -- reduced lisinopril dose to 5mg daily with parameters on 9/14, will hold lisinopril completely for now as she needs diuretics  -- cont Lasix 20mg po BID  -- reduced spironolactone to 12.5mg daily with parameters on 9/14  -- cont KCl 20mEq po daily  -- f/u bp and adjust closely as indicated     Hypophosphatemia: Resolved  Hypokalemia     Refusing replacement    Encourage bananas     Prolonged QTc   * EKG on 7/13 showed QTc 494.   * Repeat EKG on 7/30 (while on Levaquin) showed QTc table at 475. Has since completed course of abx as below.  * Had been monitoring on telemetry this stay. No concerning findings so tele was ultimately dc'd 8/19     Chronic bilateral LE edema with chronic venous stasis dermatitis and chronic skin changes  Hx of LLE cellulitis  * Was hospitalized in 11/2021 for sepsis dt to pneumonia and LLE cellulitis.   * During this stay, BLE noted to be patty and edematous, no unilateral leg swelling appreciated; LLE had patchy areas of erythema, no fluctuance, no painful areas with palpation; legs warm to touch. Lymphedema consulted.   -- cont LE elevation, lymphedema wraps     Lower back/R flank wound/abscess, suspect dt pressure type injury, s/p surgical debridement on 7/30/22 and placement of wound vac on 8/2/22,  OA  Hx of bilateral hip replacements   Hx of chronic neck and back pain  * Pt with significant back pain early on in hospital stay. Was requiring IV hydromorphone.  Dose had to be decreased dt somnolence.  * MRI L-spine in 2018 showed multilevel degenerative changes with mild central stenosis. Patient has chronic back pain, reports having it over the last 2 years.   * During this stay, patient was noted with 5-6cm by 2-3cm wound with swelling/fluid/blistering in  a fold of her lower back, did not appear infected; this seemed to be the source of her pain. Padded dressing placed and WOC RN ordered. Pain initially improved.   * Was placed on course of Augmentin on 7/24.   * On 7/26, was re-evaluated by WOC RN. Wound appeared more erythematous and purulence expressed. Worsening with shear stress from lift. Procal <0.05, WBC WNL. Abx changed to IV clindamycin.  * Wound cultures obtained. On 7/28, wound grew proteus and staph aureus (MRSA neg). US neg for abscess.   * Lost IV access on 7/28 so abx were changed to oral clindamycin and oral levaquin. IV access re-established but was continued on oral abx.  * General surgery consulted, ultimately underwent excisional debridement of R flank abscess in OR on 7/30. Intraop cultures showed polymicrobial growth with proteus mirabilis x2 strains (both pan-sensitive), corynebacterium striatum and enterococcus faecalis.  * Wound vac placed per general surgery on 8/2 >> subsequently removed during stay.  * ID consulted on 8/2 and abx narrowed to Augmentin alone, completed an additional 5 days of treatment on 8/7.      -- wound per WOC RN, follows weekly  -- prns available for pain  -- cont regular repositioning     Pressure Injury, left Medial thigh/groin  Pressure Injury Stage: Deep Tissue Pressure Injury (DTPI), hospital acquired, now HAPI stage 2 pressure injury device related purwick external catheter tubing (not the purwick device itself)  - Wound care following     Pressure injury bilateral buttocks  * Noted by nursing on 09/02/22. WOC RN following as above.      Suspected meningioma left parietal region, incidentally seen on admission CT on 7/13  * Head CT 7/13 showed a calcified extra-axial mass overlying the left parietal region measuring 1.4 cm, potentially representing a meningioma.  * Will need serial monitoring OP after discharge.      Anemia, suspect dilutional component: Resolved  * Hgb normal on admit. Hgb 11.5 on 7/16. No  overt clinical signs of major bleeding.  * Hgb now stable at 12-13      ntertriginous dermatitis  * Chronic and stable, cont clotrimazole powder     Constipation  * Continue sched bowel regimen     Morbid obesity  * BMI 59 on admission. Recommend aggressive dietary and lifestyle modifications as condition improves     Suspected sleep apnea  * O2 drop to mid 80s overnight 8/14. Briefly placed on 4LPM, which she then removed and then slept okay with sats in 90s thereafter.  * Recommend sleep study as outpatient     Severe malnutrition. In Context of:  Acute illness or injury  Chronic illness or disease  Malnutrition: (8/31)   % Weight Loss:  > 5% in 1 month (severe malnutrition) - significant loss over this admit  % Intake:  </= 50% for >/= 5 days (severe malnutrition) - consistent this admit x49 days  Subcutaneous Fat Loss:  Orbital region moderate depletion - visual, per 8/16 note  Muscle Loss:  Temporal region moderate depletion - visual, per 8/16 note  Fluid Retention:  Mild generalized edema  - dietitian following, recommend alternative feeding source? Not clear she would tolerate a tube feed with her confusion, will monitor, perhaps consider some carb counting          Diet: Advance Diet as Tolerated  Regular Diet Adult Thin Liquids (level 0) (Upright position, alert, and assist as needed)  Room Service    DVT Prophylaxis: Enoxaparin (Lovenox) SQ  [place] Catheter: Not present  Central Lines: None  Cardiac Monitoring: None  Code Status: No CPR- Do NOT Intubate      Disposition Plan      Expected Discharge Date: 10/04/2022    Discharge Delays: Placement - LTC  Covid Vaccine for Placement  *Medically Ready for Discharge    Discharge Comments: Multiple referrerals outl  Did receive her 2nd covid shot.. Leo meeting with Palliative on the 28??        The patient's care was discussed with the Patient    Hamilton Higginbotham DO  Hospitalist Service  [facility]  Securely message with the [facility]  Web Console (learn more here)  Text page via [facility] Paging/Directory         Clinically Significant Risk Factors Present on Admission                      ______________________________________________________________________    Interval History   No pain or complaints. Denies new issues. Remains confused as to why cannot go home. Agreeable to rehab today    Data reviewed today: I reviewed all medications, new labs and imaging results over the last 24 hours. I personally reviewed no images or EKG's today.    Physical Exam   Vital Signs: Temp: 97.5  F (36.4  C) Temp src: Oral BP: 94/56 Pulse: 75   Resp: 16 SpO2: 92 % O2 Device: None (Room air)    Weight: 293 lbs 3.2 oz  Constitutional: awake, alert, cooperative, no apparent distress  Neurologic: Awake, alert  Neuropsychiatric: General: normal, calm and normal eye contact    Data   Recent Labs   Lab 09/30/22  0850 09/29/22  2226 09/29/22  0855 09/27/22  1553 09/25/22  0532 09/24/22  2129   WBC 4.9  --   --   --   --   --    HGB 13.8  --   --   --   --   --    MCV 90  --   --   --   --   --      --   --  291  --  296     --   --   --   --   --    POTASSIUM 3.7  3.7 3.8 3.2* 3.5   < >  --    CHLORIDE 104  --   --   --   --   --    CO2 28  --   --   --   --   --    BUN 19  --   --   --   --   --    CR 0.72  --  0.65  --   --   --    ANIONGAP 6  --   --   --   --   --    ASHIA 8.5  --   --   --   --   --    GLC 93  --   --   --   --   --     < > = values in this interval not displayed.     No results found for this or any previous visit (from the past 24 hour(s)).  Medications     - MEDICATION INSTRUCTIONS -         ARIPiprazole  10 mg Oral QPM     aspirin  325 mg Oral Daily     atorvastatin  40 mg Oral QPM     enoxaparin ANTICOAGULANT  40 mg Subcutaneous Q12H     furosemide  20 mg Oral BID     [Held by provider] lisinopril  5 mg Oral Daily     miconazole   Topical BID     multivitamins w/minerals  15 mL Oral Daily     OLANZapine  2.5 mg Oral At Bedtime      polyethylene glycol  17 g Oral Daily     potassium chloride  20 mEq Oral Daily     senna-docusate  1-2 tablet Oral BID     sertraline  50 mg Oral Daily     sodium chloride (PF)  3 mL Intracatheter Q8H     spironolactone  12.5 mg Oral Daily

## 2022-10-01 NOTE — PROGRESS NOTES
[facility]    Medicine Progress Note - Hospitalist Service    Date of Admission:  7/13/2022    Assessment & Plan      Cristy Bailey is a 77 year old female with a PMHx significant for morbid obesity, hypertension, and hyperlipidemia, who presented to [facility] 7/13/2022 with complete left-sided paralysis, left-sided facial droop, and difficulty speaking. CTA head remarkable for right MCA occlusion. She was given tenecteplase and subsequently transferred to [facility] 7/13/2022 for thrombectomy.   Hospital stay complicated by flank wound which required surgical debridement and placement of wound vac and findings LTC placement.     Acute R MCA ischemic stroke with edema and right to left midline shift  S/p tenecteplase and subsequent R MCA mechanical thrombectomy 7/13/22  * Presented to [facility] 7/13 with complete paralysis, left-sided facial droop, and aphasia. Code stroke initiated. Head CT 7/13 showed signs of an evolving R MCA infarct. CTA head 7/13 showed a right carotid terminus occlusion, right middle cerebral artery M1  segment occlusion with poor opacification of the more distal right MCA branches/poor collateral flow. CTA neck 7/13 negative for acute occlusions. Pt given tenecteplase 7/13 at 13:11. Noted to be agitated and was subsequently intubated given need for procedure.   * Subsequently transferred to [facility] 7/13/2022 where she underwent above procedure.   7/14: Extubated. Head CT 7/14 showed evolution of acute infarct involving the R MCA distribution with increased swelling, cortical effacement, and new mild right-to-left midline shift; questionable hypoattenuation involving the bilateral occipital lobes which could be artifactual.   * Additional stroke workup pursued this stay -- echo 7/14 showed EF 50%, grade 1 diastolic dysfunction   * Started ASA and atorvastatin per stroke team.   * Repeat head CT 7/15 showed evolving R MCA stroke with areas of edema,  overall stable.   plan  -- continue full dose aspirin  -- goal SBP <140/90 and met   -- monitored on telemetry for first several wks of hospital stay without abnl rhythm so can likely defer prior recommendations for 30d cardiac event monitor at discharge  -- will need to follow up with [facility] in 6-8 wks  -- Waiting on placement     Severe malnutrition in context of acute illness and chronic disease  * Nutritionist following. Appetite poor this stay, needing encouragement to take po.   *there is some discussion about needing feeding support, but patient does not want to have a feeding tube.  * 70 lb weight loss in the last few months (likely lower in the context of diuresis for CHF and inherent error in measuring)  Plan  - encourage oral intake  - patient does not want a feeding tube at this point and her health care agent dulce maria agrees with this, consistent with previous wishes     Anxiety   Cognitive impairment, confusion as above.   * As stay has progressed, patient has consistently been oriented x2 (to self, location), unable to state the year. Also noted to have intermittent situational confusion. States family members have been visiting when they have not.   * Ongoing issues with anxiety this stay -- had been on regimen of Seroquel 12.5mg at dinner and 25mg HS with 12.5mg po q6h prn available. Mirtazapine 7.5mg on 8/15 evening given decreased appetite  * Psych consulted for assistance with ongoing mgmt -- seen on 8/16 and meds adjusted. Seroquel HS changed to Abilify 5mg HS. Recommended Zyptexa 5mg q6h prn for breakthrough insomnia/agitation while going off Seroquel. Consider uptitration of Abilify per psych recs. Advised uptitration of sertraline (25mg x3d then increase to 50mg daily beginning 8/20) and Remeron stopped. Also advised to liberalize Ativan to 0.5mg q6h prn.    * Seen by psych on 8/24, Abilify increased from 5mg to 10mg.  * PRN ativan stopped on 08/27/22 as leading to increased confusion. Hydroxyzine  stopped  as well.   -- cont current regimen of meds including Abilify 5mg every evening, Zyprexa 2.5mg HS and sertraline 50mg daily  -- cont prns per psych including Zyprexa 5mg q6h prn     Goals of care, failure to thrive  Discussed with patient and significant other/Health care agent Leo. She remains confused. He reiterated her wishes for DNR/DNI and no feeding tubes   We discussed concern about overall prognosis, with her eating 0-25% of her meals and nutrition recommending alternative forms of nutritions  She has lost 70 lbs since admit (some of that is probably related to diuresis and some related to variable weights in hospital, but still significant)  Overall, placement in a facility has been difficult, and there is concern about her continued deteriorating under these circumstances  Plan  - now DNR/DNI  - see discussion regarding weight loss above  Palliative care meeting with Leo 9/28.     Urinary retention: Resolved  * Retaining urine on 8/14. UA WNL, not worrisome for infection  * Requiring straight cath x1 on 8/20 PM  * Good UOP, no longer requiring straight cath. No evidence of obstruction on PVR.      Dysphagia due to CVA: Resolved  * Seen by SLP and noted with dysphagia. Initially required some intermittent fluid boluses.   * Was eventually able to advance to regular diet w/thin liquids     Dyslipidemia  * FLP this stay showed tot cholest 163, HDL 47, LDL 91, .   * Started on atorvastatin 40 mg daily     Volume overload dt diastolic CHF exacerbation: Improved  Essential hypertension  Hypokalemia, recurrent on lasix, and often refusing potassium   * Not on/needing meds PTA.   * As above, echo this stay showed EF 50% with grade I diastolic dysfunction; RV not well visualized but appeared mild-moderately dilated with global systolic function probably mildly reduced.   * BPs were initially soft this stay and required IVFs. BPs improved.   * Developed pedal edema required IV Lasix. Ultimately  transition to oral Lasix.  * Started on lisinopril this stay  * BPs improved during stay.   * Lasix decreased from 40mg in AM and 20mg in PM to 20mg BID on 9/3 and added spironolactone 25mg daily     -- reduced lisinopril dose to 5mg daily with parameters on 9/14, will hold lisinopril completely for now as she needs diuretics  -- cont Lasix 20mg po BID  -- reduced spironolactone to 12.5mg daily with parameters on 9/14  -- cont KCl 20mEq po daily  -- f/u bp and adjust closely as indicated     Hypophosphatemia: Resolved  Hypokalemia     Refusing replacement    Encourage bananas     Prolonged QTc   * EKG on 7/13 showed QTc 494.   * Repeat EKG on 7/30 (while on Levaquin) showed QTc table at 475. Has since completed course of abx as below.  * Had been monitoring on telemetry this stay. No concerning findings so tele was ultimately dc'd 8/19     Chronic bilateral LE edema with chronic venous stasis dermatitis and chronic skin changes  Hx of LLE cellulitis  * Was hospitalized in 11/2021 for sepsis dt to pneumonia and LLE cellulitis.   * During this stay, BLE noted to be patty and edematous, no unilateral leg swelling appreciated; LLE had patchy areas of erythema, no fluctuance, no painful areas with palpation; legs warm to touch. Lymphedema consulted.   -- cont LE elevation, lymphedema wraps     Lower back/R flank wound/abscess, suspect dt pressure type injury, s/p surgical debridement on 7/30/22 and placement of wound vac on 8/2/22,  OA  Hx of bilateral hip replacements   Hx of chronic neck and back pain  * Pt with significant back pain early on in hospital stay. Was requiring IV hydromorphone.  Dose had to be decreased dt somnolence.  * MRI L-spine in 2018 showed multilevel degenerative changes with mild central stenosis. Patient has chronic back pain, reports having it over the last 2 years.   * During this stay, patient was noted with 5-6cm by 2-3cm wound with swelling/fluid/blistering in a fold of her lower back, did  not appear infected; this seemed to be the source of her pain. Padded dressing placed and WOC RN ordered. Pain initially improved.   * Was placed on course of Augmentin on 7/24.   * On 7/26, was re-evaluated by WOC RN. Wound appeared more erythematous and purulence expressed. Worsening with shear stress from lift. Procal <0.05, WBC WNL. Abx changed to IV clindamycin.  * Wound cultures obtained. On 7/28, wound grew proteus and staph aureus (MRSA neg). US neg for abscess.   * Lost IV access on 7/28 so abx were changed to oral clindamycin and oral levaquin. IV access re-established but was continued on oral abx.  * General surgery consulted, ultimately underwent excisional debridement of R flank abscess in OR on 7/30. Intraop cultures showed polymicrobial growth with proteus mirabilis x2 strains (both pan-sensitive), corynebacterium striatum and enterococcus faecalis.  * Wound vac placed per general surgery on 8/2 >> subsequently removed during stay.  * ID consulted on 8/2 and abx narrowed to Augmentin alone, completed an additional 5 days of treatment on 8/7.      -- wound per WOC RN, follows weekly  -- prns available for pain  -- cont regular repositioning     Pressure Injury, left Medial thigh/groin  Pressure Injury Stage: Deep Tissue Pressure Injury (DTPI), hospital acquired, now HAPI stage 2 pressure injury device related purwick external catheter tubing (not the purwick device itself)  - Wound care following     Pressure injury bilateral buttocks  * Noted by nursing on 09/02/22. WOC RN following as above.      Suspected meningioma left parietal region, incidentally seen on admission CT on 7/13  * Head CT 7/13 showed a calcified extra-axial mass overlying the left parietal region measuring 1.4 cm, potentially representing a meningioma.  * Will need serial monitoring OP after discharge.      Anemia, suspect dilutional component: Resolved  * Hgb normal on admit. Hgb 11.5 on 7/16. No overt clinical signs of major  bleeding.  * Hgb now stable at 12-13      ntertriginous dermatitis  * Chronic and stable, cont clotrimazole powder     Constipation  * Continue sched bowel regimen     Morbid obesity  * BMI 59 on admission. Recommend aggressive dietary and lifestyle modifications as condition improves     Suspected sleep apnea  * O2 drop to mid 80s overnight 8/14. Briefly placed on 4LPM, which she then removed and then slept okay with sats in 90s thereafter.  * Recommend sleep study as outpatient     Severe malnutrition. In Context of:  Acute illness or injury  Chronic illness or disease  Malnutrition: (8/31)   % Weight Loss:  > 5% in 1 month (severe malnutrition) - significant loss over this admit  % Intake:  </= 50% for >/= 5 days (severe malnutrition) - consistent this admit x49 days  Subcutaneous Fat Loss:  Orbital region moderate depletion - visual, per 8/16 note  Muscle Loss:  Temporal region moderate depletion - visual, per 8/16 note  Fluid Retention:  Mild generalized edema  - dietitian following, recommend alternative feeding source? Not clear she would tolerate a tube feed with her confusion, will monitor, perhaps consider some carb counting          Diet: Advance Diet as Tolerated  Regular Diet Adult Thin Liquids (level 0) (Upright position, alert, and assist as needed)  Room Service    DVT Prophylaxis: Enoxaparin (Lovenox) SQ  [place] Catheter: Not present  Central Lines: None  Cardiac Monitoring: None  Code Status: No CPR- Do NOT Intubate      Disposition Plan      Expected Discharge Date: 10/04/2022    Discharge Delays: Placement - LTC  Covid Vaccine for Placement  *Medically Ready for Discharge    Discharge Comments: Multiple referrerals outl  Did receive her 2nd covid shot.. Leo meeting with Palliative on the 28??        The patient's care was discussed with the Patient    Hamilton Higginbotham,   Hospitalist Service  [facility]  Securely message with the Vocera Web Console (learn more  here)  Text page via [facility] Paging/Directory         Clinically Significant Risk Factors Present on Admission                      ______________________________________________________________________    Interval History   No pain or complaints. Denies new issues.Agreeable to rehab today    Data reviewed today: I reviewed all medications, new labs and imaging results over the last 24 hours. I personally reviewed no images or EKG's today.    Physical Exam   Vital Signs: Temp: 97.2  F (36.2  C) Temp src: Axillary BP: 116/61 Pulse: 62   Resp: 16 SpO2: 97 % O2 Device: None (Room air)    Weight: 293 lbs 3.2 oz  Constitutional: awake, alert, cooperative, no apparent distress  Neurologic: Awake, alert  Neuropsychiatric: General: normal, calm and normal eye contact    Data   Recent Labs   Lab 10/01/22  1159 09/30/22  0850 09/29/22  2226 09/29/22  0855 09/27/22  1553 09/25/22  0532 09/24/22  2129   WBC  --  4.9  --   --   --   --   --    HGB  --  13.8  --   --   --   --   --    MCV  --  90  --   --   --   --   --    PLT  --  266  --   --  291  --  296   NA  --  138  --   --   --   --   --    POTASSIUM 3.5 3.7  3.7 3.8 3.2* 3.5   < >  --    CHLORIDE  --  104  --   --   --   --   --    CO2  --  28  --   --   --   --   --    BUN  --  19  --   --   --   --   --    CR  --  0.72  --  0.65  --   --   --    ANIONGAP  --  6  --   --   --   --   --    ASHIA  --  8.5  --   --   --   --   --    GLC  --  93  --   --   --   --   --     < > = values in this interval not displayed.     No results found for this or any previous visit (from the past 24 hour(s)).  Medications     - MEDICATION INSTRUCTIONS -         ARIPiprazole  10 mg Oral QPM     aspirin  325 mg Oral Daily     atorvastatin  40 mg Oral QPM     enoxaparin ANTICOAGULANT  40 mg Subcutaneous Q12H     furosemide  20 mg Oral BID     [Held by provider] lisinopril  5 mg Oral Daily     miconazole   Topical BID     multivitamins w/minerals  15 mL Oral Daily     OLANZapine  2.5 mg Oral At  Bedtime     polyethylene glycol  17 g Oral Daily     potassium chloride  20 mEq Oral Daily     senna-docusate  1-2 tablet Oral BID     sertraline  50 mg Oral Daily     sodium chloride (PF)  3 mL Intracatheter Q8H     spironolactone  12.5 mg Oral Daily

## 2022-10-01 NOTE — PROGRESS NOTES
Reason for Admission: Acute R MCA ischemic stroke with edema and right to left midline shift  S/p tenecteplase and subsequent R MCA mechanical thrombectomy 7/13/22    Code status: DNR/DNI  Seizure or isolation precaution: NA  Cognitive/Mentation: A/Ox 2, forgetful   Neuros/CMS: no new changes, neuro status is at baseline. see flow sheet for details. BUE extremity musle strength 4/5 and BLE 3/5. Pt has +2 edema in BLE and foot. VSS and on room air.   CIWA Protocol: NA  VS: stable   Tele: N/A   GI: BS active x4, passing flatus, last BM on 9/30. BM was loose, laxatives held.  incontinent  : voiding w/o difficulty  incontinent.  Use of snyder, external catheter, or urinal: pt likes to be up and use the commode. Pt uses the commode at night and had a BM     Pulmonary: LS clear   Pain: No pain   Critical Labs or Electrolyte Replacement Protocol: K+ and Mg, normal, recheck in AM  Use of oxygen: N/A   Heparin or other drips: N/A     BG checks: N/A   Tests, MRI, CT, SEDRICK, Echo: No tests pending currently    Drains: SL   Skin: intact. Repositioned q2h. Pt has wound on the lower back, covered with mepilex, CDI. Pt also has scattered bruises on the abdomen likely from lovenox injection. Do not use pure wick, causes skin breakdown   Edema: +2 edema in BLE. Pt uses stocking. Foot elevated.   Surgical site: N/A   Activity: Assist x 2 with lift   Diet: regular  with thin  liquids. Takes pills whole one at a time   Tube Feeding: N/A   Discharge: pending. Likely LTC

## 2022-10-01 NOTE — PLAN OF CARE
Goal Outcome Evaluation:    Summary: CVA R MCA  DATE & TIME: 10/1/22 4435-0063  Cognitive Concerns/ Orientation : A&O to self and place  BEHAVIOR & AGGRESSION TOOL COLOR: Green     ABNL VSS on RA.  MOBILITY: Up with assist of 2/lift. Turned and repositioned; on pulsate mattress. Up to chair mid-day   PAIN MANAGMENT: Denies pain  DIET: Regular. Poor appetite; Oral fluids encouraged   BOWEL/BLADDER: Incontinent at times. Used BSC. Small liquid BM x2 this shift, morning Miralax and senna held  ABNL LAB/BG: K 3.5, Mg 2.0 both next check AM labs  DRAIN/DEVICES: PIV SL  TELEMETRY RHYTHM: NA  SKIN: Dressing to right flank/back C/D/I Blanchable rednes to coccyx/buttocks. Blue pads, no brief in bed due to patient reporting elastic sides digging into abdomen/hips. Mild redness under skin folds. +2 edema to BLEs, patty. Edema-wear in place to BLEs. Legs elevated on pillows  TESTS/PROCEDURES: NA  D/C DATE: Pending TCU placement.   Discharge Barriers: Placement  OTHER IMPORTANT INFO: SW following.

## 2022-10-01 NOTE — PLAN OF CARE
Goal Outcome Evaluation:    Summary: CVA R MCA  DATE & TIME: 9/30/22 [id number]  Cognitive Concerns/ Orientation : A&O to self and place  BEHAVIOR & AGGRESSION TOOL COLOR: Green     ABNL VSS on RA.  MOBILITY: Up with assist of 2/lift. Turned and repositioned; on pulsate mattress. Up to chair mid-day and stood with walker during PT  PAIN MANAGMENT: Denied  DIET: Regular. Poor appetite; Oral fluids encouraged   BOWEL/BLADDER: Incontinent at times. Used BSC this afternoon. Small BM this shift  ABNL LAB/BG: K+ 3.7, Mg 2.1 both recheck in AM   DRAIN/DEVICES: PIV SL  TELEMETRY RHYTHM: NA  SKIN: Dressing to right flank/back remain changed. Blanchable rednes to coccyx/buttocks. Blue pads, no brief in bed due to patient reporting elastic sides digging into abdomen/hips. Mild redness under skin folds. +2 edema to BLEs, patty. Edema-wear in place to BLEs. Legs elevated on pillows  TESTS/PROCEDURES: NA  D/C DATE: Pending placement.   Discharge Barriers: Placement  OTHER IMPORTANT INFO: SW following.

## 2022-10-02 NOTE — PLAN OF CARE
Goal Outcome Evaluation:    Summary: CVA R MCA  DATE & TIME: 10/2/22 [id number]  Cognitive Concerns/ Orientation : A&O to self and place  BEHAVIOR & AGGRESSION TOOL COLOR: Green     ABNL VSS on RA.  MOBILITY: Up with assist of 2/lift. On pulsate mattress. Refuses turns at times. Refused chair when offered today  PAIN MANAGMENT: Denies pain  DIET: Regular. Poor appetite; Oral fluids encouraged   BOWEL/BLADDER: Incontinent at times. Up to BSC  ABNL LAB/BG: Mg 2.0, K 3.3 replaceing with IV. Next check 2300  DRAIN/DEVICES: PIV x2 SL  TELEMETRY RHYTHM: NA  SKIN: Mepilex Dressing to right flank/back; C/D/I. Blanchable rednes to coccyx/buttocks. Blue chux no brief in bed due to patient reporting elastic sides digging into abdomen/hips. Mild redness under skin folds, breasts; L>R. +2 edema to BLEs, patty. Edema wear on BLE  TESTS/PROCEDURES: NA  D/C DATE: Pending TCU placement.   Discharge Barriers: Placement  OTHER IMPORTANT INFO: SW following. PRN Zyprexa given x1

## 2022-10-02 NOTE — PLAN OF CARE
Goal Outcome Evaluation:      manav: CVA R MCA  DATE & TIME: 10/1/22 6610-7983  Cognitive Concerns/ Orientation : A&O to self and place  BEHAVIOR & AGGRESSION TOOL COLOR: Green     ABNL VSS on RA.  MOBILITY: Up with assist of 2/lift. Turned and repositioned at HS; on pulsate mattress. Refused to turn during night.  PAIN MANAGMENT: Denies pain  DIET: Regular. Poor appetite; Oral fluids encouraged   BOWEL/BLADDER: Incontinent at times. Has not voided this shift. Stated she had to void but couldn't go. Offered bedside commode, she refused. Bladder scanned 164.   Small liquid BM x1 on evenings. Senna/Doc held   ABNL LAB/BG: K 3.5, Mg 2.0 both next check AM labs per protocol  DRAIN/DEVICES: PIV SL  TELEMETRY RHYTHM: NA  SKIN: Dressing to right flank/back; changed Mepilex as it was coming off. Blanchable rednes to coccyx/buttocks. Blue chux no brief in bed due to patient reporting elastic sides digging into abdomen/hips. Mild redness under skin folds, breasts; L>R. +2 edema to BLEs, patty.   TESTS/PROCEDURES: NA  D/C DATE: Pending TCU placement.   Discharge Barriers: Placement  OTHER IMPORTANT INFO: SW following.

## 2022-10-02 NOTE — PROGRESS NOTES
[facility]    Medicine Progress Note - Hospitalist Service    Date of Admission:  7/13/2022    Assessment & Plan      Cristy Bailey is a 77 year old female with a PMHx significant for morbid obesity, hypertension, and hyperlipidemia, who presented to [facility] 7/13/2022 with complete left-sided paralysis, left-sided facial droop, and difficulty speaking. CTA head remarkable for right MCA occlusion. She was given tenecteplase and subsequently transferred to [facility] 7/13/2022 for thrombectomy.   Hospital stay complicated by flank wound which required surgical debridement and placement of wound vac and findings LTC placement.     Acute R MCA ischemic stroke with edema and right to left midline shift  S/p tenecteplase and subsequent R MCA mechanical thrombectomy 7/13/22  * Presented to [facility] 7/13 with complete paralysis, left-sided facial droop, and aphasia. Code stroke initiated. Head CT 7/13 showed signs of an evolving R MCA infarct. CTA head 7/13 showed a right carotid terminus occlusion, right middle cerebral artery M1  segment occlusion with poor opacification of the more distal right MCA branches/poor collateral flow. CTA neck 7/13 negative for acute occlusions. Pt given tenecteplase 7/13 at 13:11. Noted to be agitated and was subsequently intubated given need for procedure.   * Subsequently transferred to [facility] 7/13/2022 where she underwent above procedure.   7/14: Extubated. Head CT 7/14 showed evolution of acute infarct involving the R MCA distribution with increased swelling, cortical effacement, and new mild right-to-left midline shift; questionable hypoattenuation involving the bilateral occipital lobes which could be artifactual.   * Additional stroke workup pursued this stay -- echo 7/14 showed EF 50%, grade 1 diastolic dysfunction   * Started ASA and atorvastatin per stroke team.   * Repeat head CT 7/15 showed evolving R MCA stroke with areas of edema,  overall stable.   plan  -- continue full dose aspirin  -- goal SBP <140/90 and met   -- monitored on telemetry for first several wks of hospital stay without abnl rhythm so can likely defer prior recommendations for 30d cardiac event monitor at discharge  -- will need to follow up with [facility] in 6-8 wks  -- Waiting on placement     Severe malnutrition in context of acute illness and chronic disease  * Nutritionist following. Appetite poor this stay, needing encouragement to take po.   *there is some discussion about needing feeding support, but patient does not want to have a feeding tube.  * 70 lb weight loss in the last few months (likely lower in the context of diuresis for CHF and inherent error in measuring)  Plan  - encourage oral intake  - patient does not want a feeding tube at this point and her health care agent dulce maria agrees with this, consistent with previous wishes     Anxiety   Cognitive impairment, confusion as above.   * As stay has progressed, patient has consistently been oriented x2 (to self, location), unable to state the year. Also noted to have intermittent situational confusion. States family members have been visiting when they have not.   * Ongoing issues with anxiety this stay -- had been on regimen of Seroquel 12.5mg at dinner and 25mg HS with 12.5mg po q6h prn available. Mirtazapine 7.5mg on 8/15 evening given decreased appetite  * Psych consulted for assistance with ongoing mgmt -- seen on 8/16 and meds adjusted. Seroquel HS changed to Abilify 5mg HS. Recommended Zyptexa 5mg q6h prn for breakthrough insomnia/agitation while going off Seroquel. Consider uptitration of Abilify per psych recs. Advised uptitration of sertraline (25mg x3d then increase to 50mg daily beginning 8/20) and Remeron stopped. Also advised to liberalize Ativan to 0.5mg q6h prn.    * Seen by psych on 8/24, Abilify increased from 5mg to 10mg.  * PRN ativan stopped on 08/27/22 as leading to increased confusion. Hydroxyzine  stopped  as well.   -- cont current regimen of meds including Abilify 5mg every evening, Zyprexa 2.5mg HS and sertraline 50mg daily  -- cont prns per psych including Zyprexa 5mg q6h prn     Goals of care, failure to thrive  Discussed with patient and significant other/Health care agent Leo. She remains confused. He reiterated her wishes for DNR/DNI and no feeding tubes   We discussed concern about overall prognosis, with her eating 0-25% of her meals and nutrition recommending alternative forms of nutritions  She has lost 70 lbs since admit (some of that is probably related to diuresis and some related to variable weights in hospital, but still significant)  Overall, placement in a facility has been difficult, and there is concern about her continued deteriorating under these circumstances  Plan  - now DNR/DNI  - see discussion regarding weight loss above  Palliative care meeting with Leo 9/28.     Urinary retention: Resolved  * Retaining urine on 8/14. UA WNL, not worrisome for infection  * Requiring straight cath x1 on 8/20 PM  * Good UOP, no longer requiring straight cath. No evidence of obstruction on PVR.      Dysphagia due to CVA: Resolved  * Seen by SLP and noted with dysphagia. Initially required some intermittent fluid boluses.   * Was eventually able to advance to regular diet w/thin liquids     Dyslipidemia  * FLP this stay showed tot cholest 163, HDL 47, LDL 91, .   * Started on atorvastatin 40 mg daily     Volume overload dt diastolic CHF exacerbation: Improved  Essential hypertension  Hypokalemia, recurrent on lasix, and often refusing potassium   * Not on/needing meds PTA.   * As above, echo this stay showed EF 50% with grade I diastolic dysfunction; RV not well visualized but appeared mild-moderately dilated with global systolic function probably mildly reduced.   * BPs were initially soft this stay and required IVFs. BPs improved.   * Developed pedal edema required IV Lasix. Ultimately  transition to oral Lasix.  * Started on lisinopril this stay  * BPs improved during stay.   * Lasix decreased from 40mg in AM and 20mg in PM to 20mg BID on 9/3 and added spironolactone 25mg daily     -- reduced lisinopril dose to 5mg daily with parameters on 9/14, will hold lisinopril completely for now as she needs diuretics  -- cont Lasix 20mg po BID  -- reduced spironolactone to 12.5mg daily with parameters on 9/14  -- cont KCl 20mEq po daily  -- f/u bp and adjust closely as indicated     Hypophosphatemia: Resolved  Hypokalemia     Refusing replacement    Encourage bananas     Prolonged QTc   * EKG on 7/13 showed QTc 494.   * Repeat EKG on 7/30 (while on Levaquin) showed QTc table at 475. Has since completed course of abx as below.  * Had been monitoring on telemetry this stay. No concerning findings so tele was ultimately dc'd 8/19     Chronic bilateral LE edema with chronic venous stasis dermatitis and chronic skin changes  Hx of LLE cellulitis  * Was hospitalized in 11/2021 for sepsis dt to pneumonia and LLE cellulitis.   * During this stay, BLE noted to be patty and edematous, no unilateral leg swelling appreciated; LLE had patchy areas of erythema, no fluctuance, no painful areas with palpation; legs warm to touch. Lymphedema consulted.   -- cont LE elevation, lymphedema wraps     Lower back/R flank wound/abscess, suspect dt pressure type injury, s/p surgical debridement on 7/30/22 and placement of wound vac on 8/2/22,  OA  Hx of bilateral hip replacements   Hx of chronic neck and back pain  * Pt with significant back pain early on in hospital stay. Was requiring IV hydromorphone.  Dose had to be decreased dt somnolence.  * MRI L-spine in 2018 showed multilevel degenerative changes with mild central stenosis. Patient has chronic back pain, reports having it over the last 2 years.   * During this stay, patient was noted with 5-6cm by 2-3cm wound with swelling/fluid/blistering in a fold of her lower back, did  not appear infected; this seemed to be the source of her pain. Padded dressing placed and WOC RN ordered. Pain initially improved.   * Was placed on course of Augmentin on 7/24.   * On 7/26, was re-evaluated by WOC RN. Wound appeared more erythematous and purulence expressed. Worsening with shear stress from lift. Procal <0.05, WBC WNL. Abx changed to IV clindamycin.  * Wound cultures obtained. On 7/28, wound grew proteus and staph aureus (MRSA neg). US neg for abscess.   * Lost IV access on 7/28 so abx were changed to oral clindamycin and oral levaquin. IV access re-established but was continued on oral abx.  * General surgery consulted, ultimately underwent excisional debridement of R flank abscess in OR on 7/30. Intraop cultures showed polymicrobial growth with proteus mirabilis x2 strains (both pan-sensitive), corynebacterium striatum and enterococcus faecalis.  * Wound vac placed per general surgery on 8/2 >> subsequently removed during stay.  * ID consulted on 8/2 and abx narrowed to Augmentin alone, completed an additional 5 days of treatment on 8/7.      -- wound per WOC RN, follows weekly  -- prns available for pain  -- cont regular repositioning     Pressure Injury, left Medial thigh/groin  Pressure Injury Stage: Deep Tissue Pressure Injury (DTPI), hospital acquired, now HAPI stage 2 pressure injury device related purwick external catheter tubing (not the purwick device itself)  - Wound care following     Pressure injury bilateral buttocks  * Noted by nursing on 09/02/22. WOC RN following as above.      Suspected meningioma left parietal region, incidentally seen on admission CT on 7/13  * Head CT 7/13 showed a calcified extra-axial mass overlying the left parietal region measuring 1.4 cm, potentially representing a meningioma.  * Will need serial monitoring OP after discharge.      Anemia, suspect dilutional component: Resolved  * Hgb normal on admit. Hgb 11.5 on 7/16. No overt clinical signs of major  bleeding.  * Hgb now stable at 12-13      ntertriginous dermatitis  * Chronic and stable, cont clotrimazole powder     Constipation  * Continue sched bowel regimen     Morbid obesity  * BMI 59 on admission. Recommend aggressive dietary and lifestyle modifications as condition improves     Suspected sleep apnea  * O2 drop to mid 80s overnight 8/14. Briefly placed on 4LPM, which she then removed and then slept okay with sats in 90s thereafter.  * Recommend sleep study as outpatient     Severe malnutrition. In Context of:  Acute illness or injury  Chronic illness or disease  Malnutrition: (8/31)   % Weight Loss:  > 5% in 1 month (severe malnutrition) - significant loss over this admit  % Intake:  </= 50% for >/= 5 days (severe malnutrition) - consistent this admit x49 days  Subcutaneous Fat Loss:  Orbital region moderate depletion - visual, per 8/16 note  Muscle Loss:  Temporal region moderate depletion - visual, per 8/16 note  Fluid Retention:  Mild generalized edema  - dietitian following, recommend alternative feeding source? Not clear she would tolerate a tube feed with her confusion, will monitor, perhaps consider some carb counting          Diet: Advance Diet as Tolerated  Regular Diet Adult Thin Liquids (level 0) (Upright position, alert, and assist as needed)  Room Service    DVT Prophylaxis: Enoxaparin (Lovenox) SQ  [place] Catheter: Not present  Central Lines: None  Cardiac Monitoring: None  Code Status: No CPR- Do NOT Intubate      Disposition Plan      Expected Discharge Date: 10/04/2022    Discharge Delays: Placement - LTC  Covid Vaccine for Placement  *Medically Ready for Discharge    Discharge Comments: Multiple referrerals outl  Did receive her 2nd covid shot.. Leo meeting with Palliative on the 28??  TCU placement        The patient's care was discussed with the Patient    Hamilton Higginbotham DO  Hospitalist Service  [facility]  Securely message with the Vocera Web Console  (learn more here)  Text page via [facility] Paging/Directory         Clinically Significant Risk Factors Present on Admission                      ______________________________________________________________________    Interval History   No pain or complaints. Denies new issues.Agreeable to rehab today. Leo at bedside    Data reviewed today: I reviewed all medications, new labs and imaging results over the last 24 hours. I personally reviewed no images or EKG's today.    Physical Exam   Vital Signs: Temp: 97.3  F (36.3  C) Temp src: Oral BP: 104/55 Pulse: 61   Resp: 15 SpO2: 95 % O2 Device: None (Room air)    Weight: 293 lbs 3.2 oz  Constitutional: awake, alert, cooperative, no apparent distress  Neurologic: Awake, alert  Neuropsychiatric: General: normal, calm and normal eye contact    Data   Recent Labs   Lab 10/02/22  0916 10/01/22  1159 09/30/22  0850 09/29/22  2226 09/29/22  0855 09/27/22  1553   WBC  --   --  4.9  --   --   --    HGB  --   --  13.8  --   --   --    MCV  --   --  90  --   --   --    PLT  --   --  266  --   --  291   NA  --   --  138  --   --   --    POTASSIUM 3.3* 3.5 3.7  3.7   < > 3.2* 3.5   CHLORIDE  --   --  104  --   --   --    CO2  --   --  28  --   --   --    BUN  --   --  19  --   --   --    CR  --   --  0.72  --  0.65  --    ANIONGAP  --   --  6  --   --   --    ASHIA  --   --  8.5  --   --   --    GLC  --   --  93  --   --   --     < > = values in this interval not displayed.     No results found for this or any previous visit (from the past 24 hour(s)).  Medications     - MEDICATION INSTRUCTIONS -         ARIPiprazole  10 mg Oral QPM     aspirin  325 mg Oral Daily     atorvastatin  40 mg Oral QPM     enoxaparin ANTICOAGULANT  40 mg Subcutaneous Q12H     furosemide  20 mg Oral BID     [Held by provider] lisinopril  5 mg Oral Daily     miconazole   Topical BID     multivitamins w/minerals  15 mL Oral Daily     OLANZapine  2.5 mg Oral At Bedtime     polyethylene glycol  17 g Oral Daily      [START ON 10/3/2022] potassium chloride  20 mEq Oral Daily     senna-docusate  1-2 tablet Oral BID     sertraline  50 mg Oral Daily     sodium chloride (PF)  3 mL Intracatheter Q8H     spironolactone  12.5 mg Oral Daily

## 2022-10-03 NOTE — PLAN OF CARE
Goal Outcome Evaluation:      Summary: CVA R MCA  DATE & TIME: 10/2/22 [id number]  Cognitive Concerns/ Orientation : A&O to self and place  BEHAVIOR & AGGRESSION TOOL COLOR: Green     ABNL VSS on RA.  MOBILITY: Up with assist of 2/lift. On pulsate mattress. Refuses turns at times.   PAIN MANAGMENT: Denies pain  DIET: Regular. Poor appetite; Oral fluids encouraged   BOWEL/BLADDER: Incontinent at times. Up to BSC during days, uses chux at night but no void yet.  ABNL LAB/BG: Mg 2.0, K 3.3, replaced. Recheck 3.4  DRAIN/DEVICES: PIV x1 SL  TELEMETRY RHYTHM: NA  SKIN: Mepilex Dressing to right flank/back changed because it was peeling up; sliding scale drainage. Blanchable redness to coccyx/buttocks. Blue chux no brief in bed due to patient reporting elastic sides digging into abdomen/hips. Mild redness under skin folds, breasts; L>R. +2 edema to BLEs, patty.  TESTS/PROCEDURES: NA  D/C DATE: Pending TCU placement.   Discharge Barriers: Placement  OTHER IMPORTANT INFO: SW following.

## 2022-10-03 NOTE — PROGRESS NOTES
[facility]    Medicine Progress Note - Hospitalist Service    Date of Admission:  7/13/2022    Assessment & Plan      Cristy Bailey is a 77 year old female with a PMHx significant for morbid obesity, hypertension, and hyperlipidemia, who presented to [facility] 7/13/2022 with complete left-sided paralysis, left-sided facial droop, and difficulty speaking. CTA head remarkable for right MCA occlusion. She was given tenecteplase and subsequently transferred to [facility] 7/13/2022 for thrombectomy.   Hospital stay complicated by flank wound which required surgical debridement and placement of wound vac and findings LTC placement.     Acute R MCA ischemic stroke with edema and right to left midline shift  S/p tenecteplase and subsequent R MCA mechanical thrombectomy 7/13/22  * Presented to [facility] 7/13 with complete paralysis, left-sided facial droop, and aphasia. Code stroke initiated. Head CT 7/13 showed signs of an evolving R MCA infarct. CTA head 7/13 showed a right carotid terminus occlusion, right middle cerebral artery M1  segment occlusion with poor opacification of the more distal right MCA branches/poor collateral flow. CTA neck 7/13 negative for acute occlusions. Pt given tenecteplase 7/13 at 13:11. Noted to be agitated and was subsequently intubated given need for procedure.   * Subsequently transferred to [facility] 7/13/2022 where she underwent above procedure.   7/14: Extubated. Head CT 7/14 showed evolution of acute infarct involving the R MCA distribution with increased swelling, cortical effacement, and new mild right-to-left midline shift; questionable hypoattenuation involving the bilateral occipital lobes which could be artifactual.   * Additional stroke workup pursued this stay -- echo 7/14 showed EF 50%, grade 1 diastolic dysfunction   * Started ASA and atorvastatin per stroke team.   * Repeat head CT 7/15 showed evolving R MCA stroke with areas of edema,  overall stable.   plan  -- continue full dose aspirin  -- goal SBP <140/90 and met   -- monitored on telemetry for first several wks of hospital stay without abnl rhythm so can likely defer prior recommendations for 30d cardiac event monitor at discharge  -- will need to follow up with [facility] in 6-8 wks  -- Waiting on placement     Severe malnutrition in context of acute illness and chronic disease  * Nutritionist following. Appetite poor this stay, needing encouragement to take po.   *there is some discussion about needing feeding support, but patient does not want to have a feeding tube.  * 70 lb weight loss in the last few months (likely lower in the context of diuresis for CHF and inherent error in measuring)  Plan  - encourage oral intake  - patient does not want a feeding tube at this point and her health care agent dulce maria agrees with this, consistent with previous wishes     Anxiety   Cognitive impairment, confusion as above.   * As stay has progressed, patient has consistently been oriented x2 (to self, location), unable to state the year. Also noted to have intermittent situational confusion. States family members have been visiting when they have not.   * Ongoing issues with anxiety this stay -- had been on regimen of Seroquel 12.5mg at dinner and 25mg HS with 12.5mg po q6h prn available. Mirtazapine 7.5mg on 8/15 evening given decreased appetite  * Psych consulted for assistance with ongoing mgmt -- seen on 8/16 and meds adjusted. Seroquel HS changed to Abilify 5mg HS. Recommended Zyptexa 5mg q6h prn for breakthrough insomnia/agitation while going off Seroquel. Consider uptitration of Abilify per psych recs. Advised uptitration of sertraline (25mg x3d then increase to 50mg daily beginning 8/20) and Remeron stopped. Also advised to liberalize Ativan to 0.5mg q6h prn.    * Seen by psych on 8/24, Abilify increased from 5mg to 10mg.  * PRN ativan stopped on 08/27/22 as leading to increased confusion. Hydroxyzine  stopped  as well.   -- cont current regimen of meds including Abilify 5mg every evening, Zyprexa 2.5mg HS and sertraline 50mg daily  -- cont prns per psych including Zyprexa 5mg q6h prn     Goals of care, failure to thrive  Discussed with patient and significant other/Health care agent Leo. She remains confused. He reiterated her wishes for DNR/DNI and no feeding tubes   We discussed concern about overall prognosis, with her eating 0-25% of her meals and nutrition recommending alternative forms of nutritions  She has lost 70 lbs since admit (some of that is probably related to diuresis and some related to variable weights in hospital, but still significant)  Overall, placement in a facility has been difficult, and there is concern about her continued deteriorating under these circumstances  Plan  - now DNR/DNI  - see discussion regarding weight loss above  Palliative care meeting with Leo 9/28.     Urinary retention: Resolved  * Retaining urine on 8/14. UA WNL, not worrisome for infection  * Requiring straight cath x1 on 8/20 PM  * Good UOP, no longer requiring straight cath. No evidence of obstruction on PVR.      Dysphagia due to CVA: Resolved  * Seen by SLP and noted with dysphagia. Initially required some intermittent fluid boluses.   * Was eventually able to advance to regular diet w/thin liquids     Dyslipidemia  * FLP this stay showed tot cholest 163, HDL 47, LDL 91, .   * Started on atorvastatin 40 mg daily     Volume overload dt diastolic CHF exacerbation: Improved  Essential hypertension  Hypokalemia, recurrent on lasix, and often refusing potassium   * Not on/needing meds PTA.   * As above, echo this stay showed EF 50% with grade I diastolic dysfunction; RV not well visualized but appeared mild-moderately dilated with global systolic function probably mildly reduced.   * BPs were initially soft this stay and required IVFs. BPs improved.   * Developed pedal edema required IV Lasix. Ultimately  transition to oral Lasix.  * Started on lisinopril this stay  * BPs improved during stay.   * Lasix decreased from 40mg in AM and 20mg in PM to 20mg BID on 9/3 and added spironolactone 25mg daily     -- reduced lisinopril dose to 5mg daily with parameters on 9/14, will hold lisinopril completely for now as she needs diuretics  -- cont Lasix 20mg po BID  -- reduced spironolactone to 12.5mg daily with parameters on 9/14  -- cont KCl 20mEq po daily  -- f/u bp and adjust closely as indicated     Hypophosphatemia: Resolved  Hypokalemia     Refusing replacement    Encourage bananas     Prolonged QTc   * EKG on 7/13 showed QTc 494.   * Repeat EKG on 7/30 (while on Levaquin) showed QTc table at 475. Has since completed course of abx as below.  * Had been monitoring on telemetry this stay. No concerning findings so tele was ultimately dc'd 8/19     Chronic bilateral LE edema with chronic venous stasis dermatitis and chronic skin changes  Hx of LLE cellulitis  * Was hospitalized in 11/2021 for sepsis dt to pneumonia and LLE cellulitis.   * During this stay, BLE noted to be patty and edematous, no unilateral leg swelling appreciated; LLE had patchy areas of erythema, no fluctuance, no painful areas with palpation; legs warm to touch. Lymphedema consulted.   -- cont LE elevation, lymphedema wraps     Lower back/R flank wound/abscess, suspect dt pressure type injury, s/p surgical debridement on 7/30/22 and placement of wound vac on 8/2/22,  OA  Hx of bilateral hip replacements   Hx of chronic neck and back pain  * Pt with significant back pain early on in hospital stay. Was requiring IV hydromorphone.  Dose had to be decreased dt somnolence.  * MRI L-spine in 2018 showed multilevel degenerative changes with mild central stenosis. Patient has chronic back pain, reports having it over the last 2 years.   * During this stay, patient was noted with 5-6cm by 2-3cm wound with swelling/fluid/blistering in a fold of her lower back, did  not appear infected; this seemed to be the source of her pain. Padded dressing placed and WOC RN ordered. Pain initially improved.   * Was placed on course of Augmentin on 7/24.   * On 7/26, was re-evaluated by WOC RN. Wound appeared more erythematous and purulence expressed. Worsening with shear stress from lift. Procal <0.05, WBC WNL. Abx changed to IV clindamycin.  * Wound cultures obtained. On 7/28, wound grew proteus and staph aureus (MRSA neg). US neg for abscess.   * Lost IV access on 7/28 so abx were changed to oral clindamycin and oral levaquin. IV access re-established but was continued on oral abx.  * General surgery consulted, ultimately underwent excisional debridement of R flank abscess in OR on 7/30. Intraop cultures showed polymicrobial growth with proteus mirabilis x2 strains (both pan-sensitive), corynebacterium striatum and enterococcus faecalis.  * Wound vac placed per general surgery on 8/2 >> subsequently removed during stay.  * ID consulted on 8/2 and abx narrowed to Augmentin alone, completed an additional 5 days of treatment on 8/7.      -- wound per WOC RN, follows weekly  -- prns available for pain  -- cont regular repositioning     Pressure Injury, left Medial thigh/groin  Pressure Injury Stage: Deep Tissue Pressure Injury (DTPI), hospital acquired, now HAPI stage 2 pressure injury device related purwick external catheter tubing (not the purwick device itself)  - Wound care following     Pressure injury bilateral buttocks  * Noted by nursing on 09/02/22. WOC RN following as above.      Suspected meningioma left parietal region, incidentally seen on admission CT on 7/13  * Head CT 7/13 showed a calcified extra-axial mass overlying the left parietal region measuring 1.4 cm, potentially representing a meningioma.  * Will need serial monitoring OP after discharge.      Anemia, suspect dilutional component: Resolved  * Hgb normal on admit. Hgb 11.5 on 7/16. No overt clinical signs of major  bleeding.  * Hgb now stable at 12-13      ntertriginous dermatitis  * Chronic and stable, cont clotrimazole powder     Constipation  * Continue sched bowel regimen     Morbid obesity  * BMI 59 on admission. Recommend aggressive dietary and lifestyle modifications as condition improves     Suspected sleep apnea  * O2 drop to mid 80s overnight 8/14. Briefly placed on 4LPM, which she then removed and then slept okay with sats in 90s thereafter.  * Recommend sleep study as outpatient     Severe malnutrition. In Context of:  Acute illness or injury  Chronic illness or disease  Malnutrition: (8/31)   % Weight Loss:  > 5% in 1 month (severe malnutrition) - significant loss over this admit  % Intake:  </= 50% for >/= 5 days (severe malnutrition) - consistent this admit x49 days  Subcutaneous Fat Loss:  Orbital region moderate depletion - visual, per 8/16 note  Muscle Loss:  Temporal region moderate depletion - visual, per 8/16 note  Fluid Retention:  Mild generalized edema  - dietitian following, recommend alternative feeding source? Not clear she would tolerate a tube feed with her confusion, will monitor, perhaps consider some carb counting          Diet: Advance Diet as Tolerated  Regular Diet Adult Thin Liquids (level 0) (Upright position, alert, and assist as needed)  Room Service    DVT Prophylaxis: Enoxaparin (Lovenox) SQ  [place] Catheter: Not present  Central Lines: None  Cardiac Monitoring: None  Code Status: No CPR- Do NOT Intubate      Disposition Plan      Expected Discharge Date: 10/07/2022    Discharge Delays: Placement - LTC  Covid Vaccine for Placement  *Medically Ready for Discharge    Discharge Comments: Multiple referrerals outl  Did receive her 2nd covid shot.. Leo meeting with Palliative on the 28??  TCU placement        The patient's care was discussed with the Patient    Hamilton Higginbotham DO  Hospitalist Service  [facility]  Securely message with the Vocera Web Console  (learn more here)  Text page via [facility] Paging/Directory         Clinically Significant Risk Factors Present on Admission                      ______________________________________________________________________    Interval History   No pain or complaints. Denies new issues.Agreeable to rehab today.     Data reviewed today: I reviewed all medications, new labs and imaging results over the last 24 hours. I personally reviewed no images or EKG's today.    Physical Exam   Vital Signs: Temp: 97.4  F (36.3  C) Temp src: Oral BP: 107/58 Pulse: 64   Resp: 16 SpO2: 93 % O2 Device: None (Room air)    Weight: 293 lbs 3.2 oz  Constitutional: awake, alert, cooperative, no apparent distress  Neurologic: Awake, alert  Neuropsychiatric: General: normal, calm and normal eye contact    Data   Recent Labs   Lab 10/02/22  2245 10/02/22  0916 10/01/22  1159 09/30/22  0850 09/29/22  2226 09/29/22  0855 09/27/22  1553   WBC  --   --   --  4.9  --   --   --    HGB  --   --   --  13.8  --   --   --    MCV  --   --   --  90  --   --   --    PLT  --   --   --  266  --   --  291   NA  --   --   --  138  --   --   --    POTASSIUM 3.4 3.3* 3.5 3.7  3.7   < > 3.2* 3.5   CHLORIDE  --   --   --  104  --   --   --    CO2  --   --   --  28  --   --   --    BUN  --   --   --  19  --   --   --    CR  --   --   --  0.72  --  0.65  --    ANIONGAP  --   --   --  6  --   --   --    ASHIA  --   --   --  8.5  --   --   --    GLC  --   --   --  93  --   --   --     < > = values in this interval not displayed.     No results found for this or any previous visit (from the past 24 hour(s)).  Medications     - MEDICATION INSTRUCTIONS -         ARIPiprazole  10 mg Oral QPM     aspirin  325 mg Oral Daily     atorvastatin  40 mg Oral QPM     enoxaparin ANTICOAGULANT  40 mg Subcutaneous Q12H     furosemide  20 mg Oral BID     [Held by provider] lisinopril  5 mg Oral Daily     miconazole   Topical BID     multivitamins w/minerals  15 mL Oral Daily     OLANZapine  2.5  mg Oral At Bedtime     polyethylene glycol  17 g Oral Daily     potassium chloride  20 mEq Oral Daily     senna-docusate  1-2 tablet Oral BID     sertraline  50 mg Oral Daily     sodium chloride (PF)  3 mL Intracatheter Q8H     spironolactone  12.5 mg Oral Daily

## 2022-10-04 NOTE — PROGRESS NOTES
[facility]    Medicine Progress Note - Hospitalist Service    Date of Admission:  7/13/2022    Assessment & Plan        Cristy Bailey is a 77 year old female with a PMHx significant for morbid obesity, hypertension, and hyperlipidemia, who presented to [facility] 7/13/2022 with complete left-sided paralysis, left-sided facial droop, and difficulty speaking. CTA head remarkable for right MCA occlusion. She was given tenecteplase and subsequently transferred to [facility] 7/13/2022 for thrombectomy.   Hospital stay complicated by flank wound which required surgical debridement and placement of wound vac and findings LTC placement.     Acute R MCA ischemic stroke with edema and right to left midline shift  S/p tenecteplase and subsequent R MCA mechanical thrombectomy 7/13/22  * Presented to [facility] 7/13 with complete paralysis, left-sided facial droop, and aphasia. Code stroke initiated. Head CT 7/13 showed signs of an evolving R MCA infarct. CTA head 7/13 showed a right carotid terminus occlusion, right middle cerebral artery M1  segment occlusion with poor opacification of the more distal right MCA branches/poor collateral flow. CTA neck 7/13 negative for acute occlusions. Pt given tenecteplase 7/13 at 13:11. Noted to be agitated and was subsequently intubated given need for procedure.   * Subsequently transferred to [facility] 7/13/2022 where she underwent above procedure.   7/14: Extubated. Head CT 7/14 showed evolution of acute infarct involving the R MCA distribution with increased swelling, cortical effacement, and new mild right-to-left midline shift; questionable hypoattenuation involving the bilateral occipital lobes which could be artifactual.   * Additional stroke workup pursued this stay -- echo 7/14 showed EF 50%, grade 1 diastolic dysfunction   * Started ASA and atorvastatin per stroke team.   * Repeat head CT 7/15 showed evolving R MCA stroke with areas of edema,  overall stable.   plan  -- continue full dose aspirin  -- goal SBP <140/90 and met   -- monitored on telemetry for first several wks of hospital stay without abnl rhythm so can likely defer prior recommendations for 30d cardiac event monitor at discharge  -- will need to follow up with [facility] in 6-8 wks  -- Waiting on placement     Severe malnutrition in context of acute illness and chronic disease  * Nutritionist following. Appetite poor this stay, needing encouragement to take po.   *there is some discussion about needing feeding support, but patient does not want to have a feeding tube.  * 70 lb weight loss in the last few months (likely lower in the context of diuresis for CHF and inherent error in measuring)  Plan  - encourage oral intake  - patient does not want a feeding tube at this point and her health care agent dulce maria agrees with this, consistent with previous wishes     Anxiety   Cognitive impairment, confusion as above.   * As stay has progressed, patient has consistently been oriented x2 (to self, location), unable to state the year. Also noted to have intermittent situational confusion. States family members have been visiting when they have not.   * Ongoing issues with anxiety this stay -- had been on regimen of Seroquel 12.5mg at dinner and 25mg HS with 12.5mg po q6h prn available. Mirtazapine 7.5mg on 8/15 evening given decreased appetite  * Psych consulted for assistance with ongoing mgmt -- seen on 8/16 and meds adjusted. Seroquel HS changed to Abilify 5mg HS. Recommended Zyptexa 5mg q6h prn for breakthrough insomnia/agitation while going off Seroquel. Consider uptitration of Abilify per psych recs. Advised uptitration of sertraline (25mg x3d then increase to 50mg daily beginning 8/20) and Remeron stopped. Also advised to liberalize Ativan to 0.5mg q6h prn.    * Seen by psych on 8/24, Abilify increased from 5mg to 10mg.  * PRN ativan stopped on 08/27/22 as leading to increased confusion. Hydroxyzine  stopped  as well.   -- cont current regimen of meds including Abilify 5mg every evening, Zyprexa 2.5mg HS and sertraline 50mg daily  -- cont prns per psych including Zyprexa 5mg q6h prn     Goals of care, failure to thrive  Discussed with patient and significant other/Health care agent Leo. She remains confused. He reiterated her wishes for DNR/DNI and no feeding tubes   We discussed concern about overall prognosis, with her eating 0-25% of her meals and nutrition recommending alternative forms of nutritions  She has lost 70 lbs since admit (some of that is probably related to diuresis and some related to variable weights in hospital, but still significant)  Overall, placement in a facility has been difficult, and there is concern about her continued deteriorating under these circumstances  Plan  - now DNR/DNI  - see discussion regarding weight loss above  Palliative care meeting with Leo 9/28.     Urinary retention: Resolved  * Retaining urine on 8/14. UA WNL, not worrisome for infection  * Requiring straight cath x1 on 8/20 PM  * Good UOP, no longer requiring straight cath. No evidence of obstruction on PVR.      Dysphagia due to CVA: Resolved  * Seen by SLP and noted with dysphagia. Initially required some intermittent fluid boluses.   * Was eventually able to advance to regular diet w/thin liquids     Dyslipidemia  * FLP this stay showed tot cholest 163, HDL 47, LDL 91, .   * Started on atorvastatin 40 mg daily     Volume overload dt diastolic CHF exacerbation: Improved  Essential hypertension  Hypokalemia, recurrent on lasix, and often refusing potassium   * Not on/needing meds PTA.   * As above, echo this stay showed EF 50% with grade I diastolic dysfunction; RV not well visualized but appeared mild-moderately dilated with global systolic function probably mildly reduced.   * BPs were initially soft this stay and required IVFs. BPs improved.   * Developed pedal edema required IV Lasix. Ultimately  transition to oral Lasix.  * Started on lisinopril this stay  * BPs improved during stay.   * Lasix decreased from 40mg in AM and 20mg in PM to 20mg BID on 9/3 and added spironolactone 25mg daily     -- reduced lisinopril dose to 5mg daily with parameters on 9/14, will hold lisinopril completely for now as she needs diuretics  -- cont Lasix 20mg po BID  -- reduced spironolactone to 12.5mg daily with parameters on 9/14  -- cont KCl 20mEq po daily  -- f/u bp and adjust closely as indicated     Hypophosphatemia: Resolved  Hypokalemia     Refusing replacement    Encourage bananas     Prolonged QTc   * EKG on 7/13 showed QTc 494.   * Repeat EKG on 7/30 (while on Levaquin) showed QTc table at 475. Has since completed course of abx as below.  * Had been monitoring on telemetry this stay. No concerning findings so tele was ultimately dc'd 8/19     Chronic bilateral LE edema with chronic venous stasis dermatitis and chronic skin changes  Hx of LLE cellulitis  * Was hospitalized in 11/2021 for sepsis dt to pneumonia and LLE cellulitis.   * During this stay, BLE noted to be patty and edematous, no unilateral leg swelling appreciated; LLE had patchy areas of erythema, no fluctuance, no painful areas with palpation; legs warm to touch. Lymphedema consulted.   -- cont LE elevation, lymphedema wraps     Lower back/R flank wound/abscess, suspect dt pressure type injury, s/p surgical debridement on 7/30/22 and placement of wound vac on 8/2/22,  OA  Hx of bilateral hip replacements   Hx of chronic neck and back pain  * Pt with significant back pain early on in hospital stay. Was requiring IV hydromorphone.  Dose had to be decreased dt somnolence.  * MRI L-spine in 2018 showed multilevel degenerative changes with mild central stenosis. Patient has chronic back pain, reports having it over the last 2 years.   * During this stay, patient was noted with 5-6cm by 2-3cm wound with swelling/fluid/blistering in a fold of her lower back, did  not appear infected; this seemed to be the source of her pain. Padded dressing placed and WOC RN ordered. Pain initially improved.   * Was placed on course of Augmentin on 7/24.   * On 7/26, was re-evaluated by WOC RN. Wound appeared more erythematous and purulence expressed. Worsening with shear stress from lift. Procal <0.05, WBC WNL. Abx changed to IV clindamycin.  * Wound cultures obtained. On 7/28, wound grew proteus and staph aureus (MRSA neg). US neg for abscess.   * Lost IV access on 7/28 so abx were changed to oral clindamycin and oral levaquin. IV access re-established but was continued on oral abx.  * General surgery consulted, ultimately underwent excisional debridement of R flank abscess in OR on 7/30. Intraop cultures showed polymicrobial growth with proteus mirabilis x2 strains (both pan-sensitive), corynebacterium striatum and enterococcus faecalis.  * Wound vac placed per general surgery on 8/2 >> subsequently removed during stay.  * ID consulted on 8/2 and abx narrowed to Augmentin alone, completed an additional 5 days of treatment on 8/7.      -- wound per WOC RN, follows weekly  -- prns available for pain  -- cont regular repositioning     Pressure Injury, left Medial thigh/groin  Pressure Injury Stage: Deep Tissue Pressure Injury (DTPI), hospital acquired, now HAPI stage 2 pressure injury device related purwick external catheter tubing (not the purwick device itself)  - Wound care following     Pressure injury bilateral buttocks  * Noted by nursing on 09/02/22. WOC RN following as above.      Suspected meningioma left parietal region, incidentally seen on admission CT on 7/13  * Head CT 7/13 showed a calcified extra-axial mass overlying the left parietal region measuring 1.4 cm, potentially representing a meningioma.  * Will need serial monitoring OP after discharge.      Anemia, suspect dilutional component: Resolved  * Hgb normal on admit. Hgb 11.5 on 7/16. No overt clinical signs of major  bleeding.  * Hgb now stable at 12-13      ntertriginous dermatitis  * Chronic and stable, cont clotrimazole powder     Constipation  * Continue sched bowel regimen     Morbid obesity  * BMI 59 on admission. Recommend aggressive dietary and lifestyle modifications as condition improves     Suspected sleep apnea  * O2 drop to mid 80s overnight 8/14. Briefly placed on 4LPM, which she then removed and then slept okay with sats in 90s thereafter.  * Recommend sleep study as outpatient     Severe malnutrition. In Context of:  Acute illness or injury  Chronic illness or disease  Malnutrition: (8/31)   % Weight Loss:  > 5% in 1 month (severe malnutrition) - significant loss over this admit  % Intake:  </= 50% for >/= 5 days (severe malnutrition) - consistent this admit x49 days  Subcutaneous Fat Loss:  Orbital region moderate depletion - visual, per 8/16 note  Muscle Loss:  Temporal region moderate depletion - visual, per 8/16 note  Fluid Retention:  Mild generalized edema  - dietitian following, recommend alternative feeding source? Not clear she would tolerate a tube feed with her confusion, will monitor, perhaps consider some carb counting    10/4- stable, awaiting placement.      Diet: Advance Diet as Tolerated  Regular Diet Adult Thin Liquids (level 0) (Upright position, alert, and assist as needed)  Room Service    DVT Prophylaxis: Enoxaparin (Lovenox) SQ  [place] Catheter: Not present  Central Lines: None  Cardiac Monitoring: None  Code Status: No CPR- Do NOT Intubate      Disposition Plan      Expected Discharge Date: 10/07/2022    Discharge Delays: Placement - LTC  *Medically Ready for Discharge    Discharge Comments: Multiple referrerals outl  Did receive her 2nd covid shot.. Leo meeting with Palliative on the 28??  TCU placement        The patient's care was discussed with the Patient/nurse    Donal Rodriguez MD  Hospitalist Service  [facility]  Securely message with the [employer]  Web Console (learn more here)  Text page via [employer] Paging/Directory         Clinically Significant Risk Factors Present on Admission                      ______________________________________________________________________    Interval History   Stable overnight, no new complaints     Data reviewed today: I reviewed all medications, new labs and imaging results over the last 24 hours. I personally reviewed no images or EKG's today.    Physical Exam   Vital Signs: Temp: 97.4  F (36.3  C) Temp src: Oral BP: 109/61 Pulse: 65   Resp: 20 SpO2: 95 % O2 Device: None (Room air)    Weight: 293 lbs 3.2 oz  Constitutional: awake, alert, cooperative, no apparent distress  Neuropsychiatric: General: normal, calm and normal eye contact    Data   Recent Labs   Lab 10/03/22  1143 10/02/22  2245 10/02/22  0916 10/01/22  1159 09/30/22  0850 09/29/22  2226 09/29/22  0855 09/27/22  1553   WBC  --   --   --   --  4.9  --   --   --    HGB  --   --   --   --  13.8  --   --   --    MCV  --   --   --   --  90  --   --   --      --   --   --  266  --   --  291   NA  --   --   --   --  138  --   --   --    POTASSIUM 3.4 3.4 3.3*   < > 3.7  3.7   < > 3.2* 3.5   CHLORIDE  --   --   --   --  104  --   --   --    CO2  --   --   --   --  28  --   --   --    BUN  --   --   --   --  19  --   --   --    CR  --   --   --   --  0.72  --  0.65  --    ANIONGAP  --   --   --   --  6  --   --   --    ASHIA  --   --   --   --  8.5  --   --   --    GLC  --   --   --   --  93  --   --   --     < > = values in this interval not displayed.     No results found for this or any previous visit (from the past 24 hour(s)).  Medications     - MEDICATION INSTRUCTIONS -         ARIPiprazole  10 mg Oral QPM     aspirin  325 mg Oral Daily     atorvastatin  40 mg Oral QPM     enoxaparin ANTICOAGULANT  40 mg Subcutaneous Q12H     furosemide  20 mg Oral BID     [Held by provider] lisinopril  5 mg Oral Daily     miconazole   Topical BID     multivitamins w/minerals   15 mL Oral Daily     OLANZapine  2.5 mg Oral At Bedtime     polyethylene glycol  17 g Oral Daily     potassium chloride  20 mEq Oral Daily     senna-docusate  1-2 tablet Oral BID     sertraline  50 mg Oral Daily     sodium chloride (PF)  3 mL Intracatheter Q8H     spironolactone  12.5 mg Oral Daily

## 2022-10-04 NOTE — PROGRESS NOTES
[facility] Nurse Inpatient Assessment     Consulted for: Right lower back wound (Stage 3 HAPI) and Left medial thigh wound (stage 2)    Areas Assessed:      Areas visualized during today's visit: right skin fold, left medial thigh      Wound location: Right lower back in waist crease    9/13      10/4          Photo date: 9/27  Wound due to: Hospital Acquired Pressure injury stage 3   Stage: Unstageable 7/28, Following surgical debridement 7/30 Stage 3 Pressure Injury and Moisture Associated Skin Damage (MASD), suspect intertriginous pressure, appears to be deeper tissue damage within a crease, possible shear component from lift.    Wound history/plan of care:   small area of slough at lower edge remains, wound is now philip smaller. Superior area of erythema with slight improvement, not included in dressing today to see if this will help improve allowing it to breath to air.    Wound base:95 % red moist non granular tissue pale, 5% yellow adherent slough     Palpation of the wound bed: normal       Drainage: moderate      Description of drainage: serosanguinous, tan     Measurements (length x width x depth, in cm) 1.2 x 9.5 x 0.2cm      Tunneling N/A      Undermining NA  Periwound skin: necrotic tissue lifting, only one small (less than 0.5cm x 1cm) at Left lower edge of wound. Area of erythema/rash 2 x 3.5 x 0cm superior to wound-left open to air today      Color: red blanchable erythema      Temperature: normal to warm and sweaty  Odor: none    Pain: none   Pain intervention: non  Treatment goal: Heal   STATUS: improving   Supplies ordered: at bedside     Pressure Injury Location: Left Medial thigh/groin (not assessed 9/27 pt in chair, nursing reports almost healed.)  Last photo: 9/16      Wound type: Pressure Injury     Pressure Injury Stage: Deep Tissue Pressure Injury (DTPI), hospital acquired, now HAPI stage 2 pressure injury device related purwick external catheter tubing  "(not the purwick device itself)   Wound history/plan of care:   Pt has had long (day 62 today 9-13-22) hospital course and unable to get up to the bathroom. Pt complaint of heavy legs and difficulty with moving independently.     Wound base: 100%hypopigmented epidermal tissue. Distal 5cm linear area with blanchable erythema     Palpation of the wound bed: normal      Drainage: none     Description of drainage: none     Measurements (length x width x depth, in cm) healed     Tunneling N/A     Undermining N/A  Periwound skin: Intact      Color: normal and consistent with surrounding tissue      Temperature: normal   Odor: none  Pain: denies   Pain intervention prior to dressing change: N/A  STATUS: healed  Supplies ordered: none       Treatment Plan:     Posterior waist crease: Daily  1. Remove dressings and discard. Wet Aquacel AG to ease removal if dry still.   2. Cleanse wound and periwound skin with Vashe ( #[id number]) solution and 4x4\" gauze, pat dry   3. Apply 3M No Sting barrier wipe to periwound skin, let dry   4. Cut piece Aquacel AG to size of wound and apply to wound bed. Cut small piece of Aquacel AG to denuded tissue above main wound  5. Cover with Mepilex  6. Time/Date/Initial dressing change       Orders: Reviewed    RECOMMEND PRIMARY TEAM ORDER: None, at this time  Education provided: importance of repositioning, plan of care, wound progress, Moisture management and Off-loading pressure  Discussed plan of care with: Patient and Nurse  WOC nurse follow-up plan: weekly  Notify WOC if wound(s) deteriorate.  Nursing to notify the Provider(s) and re-consult the WOC Nurse if new skin concern.    DATA:     Current support surface: Bariatric Low air loss mattress    Containment of urine/stool: Incontinence Protocol and Incontinent pad in bed  BMI: Body mass index is 47.32 kg/m .   Active diet order: Orders Placed This Encounter      Advance Diet as Tolerated      Regular Diet Adult Thin Liquids (level 0) " (Upright position, alert, and assist as needed)     Output: I/O last 3 completed shifts:  In: 360 [P.O.:360]  Out: -      Labs:   Recent Labs   Lab 09/30/22  0850   HGB 13.8   WBC 4.9     Pressure injury risk assessment:   Sensory Perception: 3-->slightly limited  Moisture: 3-->occasionally moist  Activity: 2-->chairfast  Mobility: 3-->slightly limited  Nutrition: 2-->probably inadequate  Friction and Shear: 2-->potential problem  Demetrio Score: 15    Telly Meyer RN CWOCN   Dept. Pager: [phone]  Dept. Office Number: [phone]

## 2022-10-04 NOTE — PLAN OF CARE
Goal Outcome Evaluation:  Plan of Care Reviewed With: patient      Overall Patient Progress: no change     Outcome Evaluation: Ongoing poor PO, 0-25% of meals with limited motivation. Pt stating food won't go down her throat. Has declined supplements and FT. Recommend change liquid MVI/M back to tablet (pt has been refusing this). Max nutrition interventions met

## 2022-10-04 NOTE — PLAN OF CARE
Summary: CVA R MCA  DATE & TIME: 10/3/22 3206-2361  Cognitive Concerns/ Orientation : A&O to self and place  BEHAVIOR & AGGRESSION TOOL COLOR: Green     ABNL VS: VSS on RA.  MOBILITY: Up with assist of 2/lift. On pulsate mattress.   PAIN MANAGMENT: Denies pain, complains that she is uncomfortable constantly  DIET: Regular. Poor appetite; Oral fluids encouraged   BOWEL/BLADDER: Incontinent at times. Used bedpan, did not void.  ABNL LAB/BG: K+ 3.4- on scheduled K+ PO  DRAIN/DEVICES: PIV x1 SL  TELEMETRY RHYTHM: NA  SKIN: Mepilex Dressing to right flank/back changed because it was peeling up; sliding scale drainage. Blanchable redness to coccyx/buttocks. Blue chux no brief in bed due to patient reporting elastic sides digging into abdomen/hips.   TESTS/PROCEDURES: NA  D/C DATE: Pending TCU placement.   Discharge Barriers: Placement  OTHER IMPORTANT INFO: SW following.

## 2022-10-04 NOTE — PROGRESS NOTES
"CLINICAL NUTRITION SERVICES - REASSESSMENT NOTE      Recommendations to Provider:   Apart from ongoing verbal encouragement, nutrition interventions maxed out  Patient has declined nutritional supplements and a FT  ?Appropriatness of any other appetite stimulant medication     Thera-Vit-M tablet changed to liquid MVI/M per MD on 9/19 - patient has been refusing this. Recommend change back to tablet Thera-Vit-M      Malnutrition: (9/21)  % Weight Loss:  > 5% in 1 month (severe malnutrition) - significant loss over this admit  % Intake:  </= 50% for >/= 5 days (severe malnutrition) - consistent this admit x70 days  Subcutaneous Fat Loss:  Orbital region moderate depletion - visual, per 8/16 note  Muscle Loss:  Temporal region moderate depletion - visual, per 8/16 note  Fluid Retention:  Mild generalized edema     Malnutrition Diagnosis: Severe malnutrition  In Context of:  Acute illness or injury  Chronic illness or disease       EVALUATION OF PROGRESS TOWARD GOALS   Diet: Regular diet + Thins  Room service w/ assist    Intake/Tolerance:  Met with patient today   She is tearful, expressing how hard it is to eat - \"food won't go down my throat\"   Ongoing poor appetite and PO, flowsheets documenting 0-25% meal consumption for the majority of her 83 day hospitalization   Encouraged more liquid, soft items that are easier to swallow  Promoted improvement of PO for overall recovery, health and strength   Patient remains unmotivated     ASSESSED NUTRITION NEEDS:  Dosing Weight: 89.7 kg (adjusted)  Estimated Energy Needs: 3191-2389+ kcals (15-20+ Kcal/Kg)  Justification: maintenance r/t BMI, Wounds  Estimated Protein Needs: 108-135 grams protein (1.2-1.5 g pro/Kg)  Justification: wound healing, obesity guidelines, preservation of lean body mass and increased needs r/t age, Wounds   Estimated Fluid Needs: (1 mL/Kcal)  Justification: maintenance and per provider pending fluid status      NEW FINDINGS:   Chart reviewed "   Remeron stopped - unknown date    Thera-Vit-M had been discontinued per MD on 9/19 and changed to liquid MVI/M - so far patient has been refusing this   No plans for FT per patient and health care agent   Ongoing placement issues   Weight 293 lbs - on spironolactone   Vitals:    09/20/22 0500 09/21/22 0623 09/26/22 2329 09/29/22 0531   Weight: 128.9 kg (284 lb 3.2 oz) 128.4 kg (283 lb) 128.8 kg (283 lb 14.4 oz) 129.3 kg (285 lb)    09/30/22 0500   Weight: 133 kg (293 lb 3.2 oz)       Previous Goals:   Intake of at least 50% meals BID  Evaluation: Not met    Previous Nutrition Diagnosis:   Inadequate oral intake related to poor appetite, disinterest in eating, and increased protein needs for wound healing as evidenced by 0-50% of meals consistently over 76 day admission  Evaluation: No change      CURRENT NUTRITION DIAGNOSIS  Inadequate oral intake related to poor appetite, disinterest in eating, and increased protein needs for wound healing as evidenced by 0-50% of meals consistently over 83 day admission    INTERVENTIONS  Recommendations / Nutrition Prescription  Apart from ongoing verbal encouragement, nutrition interventions maxed out  Patient has declined nutritional supplements and a FT    Implementation  Composition of Meals and Snacks: encouraged, suggested ideas     Goals  Patient will improve intake to 50% meals BID       MONITORING AND EVALUATION:  Progress towards goals will be monitored and evaluated per protocol and Practice Guidelines      Pauly Castillo RD, LD  Clinical Dietitian   [facility], [facility], Ortho spine  Pager: [phone]

## 2022-10-04 NOTE — PROGRESS NOTES
Care Management Follow Up    Length of Stay (days): 83    Expected Discharge Date: 10/07/2022     Concerns to be Addressed:       Patient plan of care discussed at interdisciplinary rounds: Yes    Anticipated Discharge Disposition: Home     Anticipated Discharge Services:    Anticipated Discharge DME:      Patient/family educated on Medicare website which has current facility and service quality ratings:    Education Provided on the Discharge Plan:    Patient/Family in Agreement with the Plan:      Referrals Placed by CM/SW:    Private pay costs discussed: Not applicable    Additional Information:  SW resent TCU/LTC referrals via DOD to the following facilities:   [facility]  [facility]  (left a voice mail for the liaison, additionally)  [facility] ( Basia will rereview)  [facility] ( spoke to Zully, will review)  [facility]  [facility] (left message with Jo-Ann admissions coordinator)  [facility] ( left a message with Silvia admissions coordinator)  [facility]  [facility]  [facility]    SW will continue to follow.     JOSE GUADALUPE Flor

## 2022-10-04 NOTE — PROGRESS NOTES
Patient inquired about why she needs to keep an IV without fluids running. Nursing student explained that most all patients are required to have a working IV in place in the event that the patient needs medications or fluids quickly. Patient nodded in response. Patient described having pain when her IV is flushed. The student explained that this is a normal feeling, and offered to flush the IV at a slow rate. The patient agreed, and did not complain of pain during the flush.    Mayra Iglesias on 10/3/2022 at 7:37 PM

## 2022-10-04 NOTE — PLAN OF CARE
Summary: CVA R MCA  DATE & TIME: 10/04/22 9384-9890  Cognitive Concerns/ Orientation : A&O to self   BEHAVIOR & AGGRESSION TOOL COLOR: Green     ABNL VS: VSS on RA.  MOBILITY: Up with assist of 2/lift. On pulsate mattress.   PAIN MANAGMENT: Denies pain  DIET: Regular. Poor appetite; Oral fluids encouraged   BOWEL/BLADDER: Incontinent at times. Used bedpan.  No BM this shift.  ABNL LAB/BG: None new  DRAIN/DEVICES: PIV x1 SL  TELEMETRY RHYTHM: NA  SKIN: Mepilex Dressing to right flank/back CDI. Blanchable redness to coccyx/buttocks.   TESTS/PROCEDURES: NA  D/C DATE: Pending TCU placement.   Discharge Barriers: Placement  OTHER IMPORTANT INFO: TONY following.

## 2022-10-05 NOTE — PLAN OF CARE
"Goal Outcome Evaluation:    Summary: CVA R MCA  DATE & TIME: 10/4/22-10/5/22 8887-0123  Cognitive Concerns/ Orientation : A&O to self and place. Occasionally calling out \"help me.\" Using call light at times. Rounded on freq.   BEHAVIOR & AGGRESSION TOOL COLOR: Green     ABNL VS: VSS on RA. Q12 vitals  MOBILITY: Up with assist of 2/lift. On pulsate mattress. Turn Q2hr.   PAIN MANAGMENT: Occasional discomforts with positioning, but resolved with turns.   DIET: Regular. Poor/no appetite; Oral fluids encouraged.   BOWEL/BLADDER: Incontinent. No BM   ABNL LAB/BG: No new  DRAIN/DEVICES: LUE PIV SL, WDL.   TELEMETRY RHYTHM: NA  SKIN: Mepilex Dressing to right flank/back CDI. Blanchable redness to coccyx/buttocks.   TESTS/PROCEDURES: NA  D/C DATE: Pending TCU placement.   Discharge Barriers: Placement  OTHER IMPORTANT INFO: SW following. Bed alarm on for safety. Reassurance provided.                 "

## 2022-10-05 NOTE — PROGRESS NOTES
[facility]    Medicine Progress Note - Hospitalist Service    Date of Admission:  7/13/2022    Assessment & Plan          Cristy Bailey is a 77 year old female with a PMHx significant for morbid obesity, hypertension, and hyperlipidemia, who presented to [facility] 7/13/2022 with complete left-sided paralysis, left-sided facial droop, and difficulty speaking. CTA head remarkable for right MCA occlusion. She was given tenecteplase and subsequently transferred to [facility] 7/13/2022 for thrombectomy.   Hospital stay complicated by flank wound which required surgical debridement and placement of wound vac and findings LTC placement.     Acute R MCA ischemic stroke with edema and right to left midline shift  S/p tenecteplase and subsequent R MCA mechanical thrombectomy 7/13/22  * Presented to [facility] 7/13 with complete paralysis, left-sided facial droop, and aphasia. Code stroke initiated. Head CT 7/13 showed signs of an evolving R MCA infarct. CTA head 7/13 showed a right carotid terminus occlusion, right middle cerebral artery M1  segment occlusion with poor opacification of the more distal right MCA branches/poor collateral flow. CTA neck 7/13 negative for acute occlusions. Pt given tenecteplase 7/13 at 13:11. Noted to be agitated and was subsequently intubated given need for procedure.   * Subsequently transferred to [facility] 7/13/2022 where she underwent above procedure.   7/14: Extubated. Head CT 7/14 showed evolution of acute infarct involving the R MCA distribution with increased swelling, cortical effacement, and new mild right-to-left midline shift; questionable hypoattenuation involving the bilateral occipital lobes which could be artifactual.   * Additional stroke workup pursued this stay -- echo 7/14 showed EF 50%, grade 1 diastolic dysfunction   * Started ASA and atorvastatin per stroke team.   * Repeat head CT 7/15 showed evolving R MCA stroke with areas of  edema, overall stable.   plan  -- continue full dose aspirin  -- goal SBP <140/90 and met   -- monitored on telemetry for first several wks of hospital stay without abnl rhythm so can likely defer prior recommendations for 30d cardiac event monitor at discharge  -- will need to follow up with [facility] in 6-8 wks  -- Waiting on placement     Severe malnutrition in context of acute illness and chronic disease  * Nutritionist following. Appetite poor this stay, needing encouragement to take po.   *there is some discussion about needing feeding support, but patient does not want to have a feeding tube.  * 70 lb weight loss in the last few months (likely lower in the context of diuresis for CHF and inherent error in measuring)  Plan  - encourage oral intake  - patient does not want a feeding tube at this point and her health care agent dulce maria agrees with this, consistent with previous wishes     Anxiety   Cognitive impairment, confusion as above.   * As stay has progressed, patient has consistently been oriented x2 (to self, location), unable to state the year. Also noted to have intermittent situational confusion. States family members have been visiting when they have not.   * Ongoing issues with anxiety this stay -- had been on regimen of Seroquel 12.5mg at dinner and 25mg HS with 12.5mg po q6h prn available. Mirtazapine 7.5mg on 8/15 evening given decreased appetite  * Psych consulted for assistance with ongoing mgmt -- seen on 8/16 and meds adjusted. Seroquel HS changed to Abilify 5mg HS. Recommended Zyptexa 5mg q6h prn for breakthrough insomnia/agitation while going off Seroquel. Consider uptitration of Abilify per psych recs. Advised uptitration of sertraline (25mg x3d then increase to 50mg daily beginning 8/20) and Remeron stopped. Also advised to liberalize Ativan to 0.5mg q6h prn.    * Seen by psych on 8/24, Abilify increased from 5mg to 10mg.  * PRN ativan stopped on 08/27/22 as leading to increased confusion.  Hydroxyzine stopped  as well.   -- cont current regimen of meds including Abilify 5mg every evening, Zyprexa 2.5mg HS and sertraline 50mg daily  -- cont prns per psych including Zyprexa 5mg q6h prn     Goals of care, failure to thrive  Discussed with patient and significant other/Health care agent Leo. She remains confused. He reiterated her wishes for DNR/DNI and no feeding tubes   We discussed concern about overall prognosis, with her eating 0-25% of her meals and nutrition recommending alternative forms of nutritions  She has lost 70 lbs since admit (some of that is probably related to diuresis and some related to variable weights in hospital, but still significant)  Overall, placement in a facility has been difficult, and there is concern about her continued deteriorating under these circumstances  Plan  - now DNR/DNI  - see discussion regarding weight loss above  Palliative care meeting with Leo 9/28.     Urinary retention: Resolved  * Retaining urine on 8/14. UA WNL, not worrisome for infection  * Requiring straight cath x1 on 8/20 PM  * Good UOP, no longer requiring straight cath. No evidence of obstruction on PVR.      Dysphagia due to CVA: Resolved  * Seen by SLP and noted with dysphagia. Initially required some intermittent fluid boluses.   * Was eventually able to advance to regular diet w/thin liquids     Dyslipidemia  * FLP this stay showed tot cholest 163, HDL 47, LDL 91, .   * Started on atorvastatin 40 mg daily     Volume overload dt diastolic CHF exacerbation: Improved  Essential hypertension  Hypokalemia, recurrent on lasix, and often refusing potassium   * Not on/needing meds PTA.   * As above, echo this stay showed EF 50% with grade I diastolic dysfunction; RV not well visualized but appeared mild-moderately dilated with global systolic function probably mildly reduced.   * BPs were initially soft this stay and required IVFs. BPs improved.   * Developed pedal edema required IV Lasix.  Ultimately transition to oral Lasix.  * Started on lisinopril this stay  * BPs improved during stay.   * Lasix decreased from 40mg in AM and 20mg in PM to 20mg BID on 9/3 and added spironolactone 25mg daily     -- reduced lisinopril dose to 5mg daily with parameters on 9/14, will hold lisinopril completely for now as she needs diuretics  -- cont Lasix 20mg po BID  -- reduced spironolactone to 12.5mg daily with parameters on 9/14  -- cont KCl 20mEq po daily  -- f/u bp and adjust closely as indicated     Hypophosphatemia: Resolved  Hypokalemia     Refusing replacement    Encourage bananas     Prolonged QTc   * EKG on 7/13 showed QTc 494.   * Repeat EKG on 7/30 (while on Levaquin) showed QTc table at 475. Has since completed course of abx as below.  * Had been monitoring on telemetry this stay. No concerning findings so tele was ultimately dc'd 8/19     Chronic bilateral LE edema with chronic venous stasis dermatitis and chronic skin changes  Hx of LLE cellulitis  * Was hospitalized in 11/2021 for sepsis dt to pneumonia and LLE cellulitis.   * During this stay, BLE noted to be patty and edematous, no unilateral leg swelling appreciated; LLE had patchy areas of erythema, no fluctuance, no painful areas with palpation; legs warm to touch. Lymphedema consulted.   -- cont LE elevation, lymphedema wraps     Lower back/R flank wound/abscess, suspect dt pressure type injury, s/p surgical debridement on 7/30/22 and placement of wound vac on 8/2/22,  OA  Hx of bilateral hip replacements   Hx of chronic neck and back pain  * Pt with significant back pain early on in hospital stay. Was requiring IV hydromorphone.  Dose had to be decreased dt somnolence.  * MRI L-spine in 2018 showed multilevel degenerative changes with mild central stenosis. Patient has chronic back pain, reports having it over the last 2 years.   * During this stay, patient was noted with 5-6cm by 2-3cm wound with swelling/fluid/blistering in a fold of her lower  back, did not appear infected; this seemed to be the source of her pain. Padded dressing placed and WOC RN ordered. Pain initially improved.   * Was placed on course of Augmentin on 7/24.   * On 7/26, was re-evaluated by WOC RN. Wound appeared more erythematous and purulence expressed. Worsening with shear stress from lift. Procal <0.05, WBC WNL. Abx changed to IV clindamycin.  * Wound cultures obtained. On 7/28, wound grew proteus and staph aureus (MRSA neg). US neg for abscess.   * Lost IV access on 7/28 so abx were changed to oral clindamycin and oral levaquin. IV access re-established but was continued on oral abx.  * General surgery consulted, ultimately underwent excisional debridement of R flank abscess in OR on 7/30. Intraop cultures showed polymicrobial growth with proteus mirabilis x2 strains (both pan-sensitive), corynebacterium striatum and enterococcus faecalis.  * Wound vac placed per general surgery on 8/2 >> subsequently removed during stay.  * ID consulted on 8/2 and abx narrowed to Augmentin alone, completed an additional 5 days of treatment on 8/7.      -- wound per WOC RN, follows weekly  -- prns available for pain  -- cont regular repositioning     Pressure Injury, left Medial thigh/groin  Pressure Injury Stage: Deep Tissue Pressure Injury (DTPI), hospital acquired, now HAPI stage 2 pressure injury device related purwick external catheter tubing (not the purwick device itself)  - Wound care following     Pressure injury bilateral buttocks  * Noted by nursing on 09/02/22. WOC RN following as above.      Suspected meningioma left parietal region, incidentally seen on admission CT on 7/13  * Head CT 7/13 showed a calcified extra-axial mass overlying the left parietal region measuring 1.4 cm, potentially representing a meningioma.  * Will need serial monitoring OP after discharge.      Anemia, suspect dilutional component: Resolved  * Hgb normal on admit. Hgb 11.5 on 7/16. No overt clinical signs of  major bleeding.  * Hgb now stable at 12-13      ntertriginous dermatitis  * Chronic and stable, cont clotrimazole powder     Constipation  * Continue sched bowel regimen     Morbid obesity  * BMI 59 on admission. Recommend aggressive dietary and lifestyle modifications as condition improves     Suspected sleep apnea  * O2 drop to mid 80s overnight 8/14. Briefly placed on 4LPM, which she then removed and then slept okay with sats in 90s thereafter.  * Recommend sleep study as outpatient     Severe malnutrition. In Context of:  Acute illness or injury  Chronic illness or disease  Malnutrition: (8/31)   % Weight Loss:  > 5% in 1 month (severe malnutrition) - significant loss over this admit  % Intake:  </= 50% for >/= 5 days (severe malnutrition) - consistent this admit x49 days  Subcutaneous Fat Loss:  Orbital region moderate depletion - visual, per 8/16 note  Muscle Loss:  Temporal region moderate depletion - visual, per 8/16 note  Fluid Retention:  Mild generalized edema  - dietitian following, recommend alternative feeding source? Not clear she would tolerate a tube feed with her confusion, will monitor, perhaps consider some carb counting    10/5- stable, continue present care      Diet: Advance Diet as Tolerated  Regular Diet Adult Thin Liquids (level 0) (Upright position, alert, and assist as needed)  Room Service    DVT Prophylaxis: Enoxaparin (Lovenox) SQ  [place] Catheter: Not present  Central Lines: None  Cardiac Monitoring: None  Code Status: No CPR- Do NOT Intubate      Disposition Plan      Expected Discharge Date: 10/07/2022    Discharge Delays: Placement - LTC  *Medically Ready for Discharge    Discharge Comments: Multiple referrerals outl  Did receive her 2nd covid shot.. Leo meeting with Palliative on the 28??  TCU placement        The patient's care was discussed with the Patient     Donal Rodriguez MD  Hospitalist Service  [facility]  Securely message with the [employer]  Web Console (learn more here)  Text page via [employer] Paging/Directory         Clinically Significant Risk Factors Present on Admission                      ______________________________________________________________________    Interval History   Stable overnight, no new complaints     Data reviewed today: I reviewed all medications, new labs and imaging results over the last 24 hours. I personally reviewed no images or EKG's today.    Physical Exam   Vital Signs: Temp: 97.6  F (36.4  C) Temp src: Oral BP: 114/64 Pulse: 68   Resp: 16 SpO2: 92 % O2 Device: None (Room air)    Weight: 288 lbs 9.6 oz  Constitutional: awake, alert, cooperative, no apparent distress  Neuropsychiatric: General: normal, calm and normal eye contact    Data   Recent Labs   Lab 10/05/22  0927 10/04/22  1748 10/04/22  1128 10/03/22  1143 10/01/22  1159 09/30/22  0850 09/29/22  2226 09/29/22  0855   WBC  --   --   --   --   --  4.9  --   --    HGB  --   --   --   --   --  13.8  --   --    MCV  --   --   --   --   --  90  --   --    PLT  --   --   --  291  --  266  --   --    NA  --   --   --   --   --  138  --   --    POTASSIUM 3.8 3.9 3.2* 3.4   < > 3.7  3.7   < > 3.2*   CHLORIDE  --   --   --   --   --  104  --   --    CO2  --   --   --   --   --  28  --   --    BUN  --   --   --   --   --  19  --   --    CR  --   --   --   --   --  0.72  --  0.65   ANIONGAP  --   --   --   --   --  6  --   --    ASHIA  --   --   --   --   --  8.5  --   --    GLC  --   --   --   --   --  93  --   --     < > = values in this interval not displayed.     No results found for this or any previous visit (from the past 24 hour(s)).  Medications     - MEDICATION INSTRUCTIONS -         ARIPiprazole  10 mg Oral QPM     aspirin  325 mg Oral Daily     atorvastatin  40 mg Oral QPM     enoxaparin ANTICOAGULANT  40 mg Subcutaneous Q12H     furosemide  20 mg Oral BID     [Held by provider] lisinopril  5 mg Oral Daily     miconazole   Topical BID     multivitamins w/minerals   15 mL Oral Daily     OLANZapine  2.5 mg Oral At Bedtime     polyethylene glycol  17 g Oral Daily     potassium chloride  20 mEq Oral Daily     senna-docusate  1-2 tablet Oral BID     sertraline  50 mg Oral Daily     sodium chloride (PF)  3 mL Intracatheter Q8H     spironolactone  12.5 mg Oral Daily

## 2022-10-05 NOTE — PLAN OF CARE
"Goal Outcome Evaluation:    Summary: CVA R MCA  DATE & TIME: 10/5/22 7479-7286  Cognitive Concerns/ Orientation : A&O to self and place. Occasionally calling out \"help me.\" Forgetful. Using call light at times. Rounded on freq.   BEHAVIOR & AGGRESSION TOOL COLOR: Green     ABNL VS: VSS on RA. Q12 vitals  MOBILITY: Up with assist of 2/lift. On pulsate mattress. Turn Q2hr. Up to BSC with SaraStedy x1  PAIN MANAGMENT: Occasional discomforts with positioning, but resolved with turns.   DIET: Regular. Poor/no appetite; Oral fluids encouraged.   BOWEL/BLADDER: Continent. Small BM  ABNL LAB/BG: K 3.8, Mg 2.0 recheck both AM labs  DRAIN/DEVICES: LUE PIV SL, WDL.   TELEMETRY RHYTHM: NA  SKIN: Mepilex Dressing to right flank/back CDI. Blanchable redness to coccyx/buttocks.   TESTS/PROCEDURES: NA  D/C DATE: Pending TCU placement.   Discharge Barriers: Placement  OTHER IMPORTANT INFO: SW following. Bed alarm on for safety. Reassurance provided.                 "

## 2022-10-05 NOTE — PLAN OF CARE
"Summary: KEILY C[facility]  DATE & TIME: 10/04/22 Evenings   Cognitive Concerns/ Orientation : A&O to self and place. Occasionally calling out \"help me.\" Using call light at times. Rounded on freq.   BEHAVIOR & AGGRESSION TOOL COLOR: Green     ABNL VS: VSS on RA.  MOBILITY: Up with assist of 2/lift. On pulsate mattress. Turn Q2hr. Worked with PT today and able to sit at edge of bed and stand 2x with Mariah-Steady and 3 assist for reassurance. Up in chair this evening with lift. Tolerated for 1-2 hours.   PAIN MANAGMENT: Occasional discomforts with positioning, but resolved with turns.   DIET: Regular. Poor/no appetite; Oral fluids encouraged.   BOWEL/BLADDER: Incontinent at times. 1 smear of BM this evening.   ABNL LAB/BG: No new  DRAIN/DEVICES: LUE PIV SL, WDL.   TELEMETRY RHYTHM: NA  SKIN: Mepilex Dressing to right flank/back CDI. Blanchable redness to coccyx/buttocks.   TESTS/PROCEDURES: NA  D/C DATE: Pending TCU placement.   Discharge Barriers: Placement  OTHER IMPORTANT INFO: SW following. Bed alarm on for safety. Reassurance provided.                       "

## 2022-10-05 NOTE — PROGRESS NOTES
"SPIRITUAL HEALTH SERVICES Progress Note  [facility]  [address]    Saw pt Cristy Bailey per ptnt request.     Distress - Upon my introduction, ptnt appeared sad and teary eyed. Ptnt expressed a desire for visitors, acknowledging that she feels lonely. \"Thank you for talking with me. I miss people visiting me.\"     Coping - She asked me, \"Could you stay with me a while?\" I provided a supportive presence, compassionate touch, and prayer.    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   My visit appeared meaningful to her; additional visits would provide her with further support.       Plan - I plan to have myself or the unit  follow-up with her.  services remain available.     DILMA BeckDiv  Chaplain Resident   [phone]   "

## 2022-10-06 NOTE — PLAN OF CARE
"Goal Outcome Evaluation:  Summary: CVA R MCA  DATE & TIME: 10/5/22 4699-7231  Cognitive Concerns/ Orientation : A&O to self and place. Occasionally calling out \"help me.\" Forgetful. Reminded/educated to use call-light. Safety checks/rounded on freq.   BEHAVIOR & AGGRESSION TOOL COLOR: Green     ABNL VS: VSS on RA. Q12 vitals  MOBILITY: Up with assist of 2/lift. On pulsate mattress. Turn Q2hr. Up to BSC with SaraStedy x1. Pt refused repositioning. BLE elevated, weight shifted.  PAIN MANAGMENT: C/o BLE pain; offered repo, legs elevated  DIET: Regular. Poor/no appetite; Oral fluids encouraged.   BOWEL/BLADDER: Continent.   ABNL LAB/BG: K 3.8, Mg 2.0 recheck both AM labs  DRAIN/DEVICES: LUE PIV SL, WDL.   TELEMETRY RHYTHM: NA  SKIN: Mepilex Dressing to right flank/back CDI. Blanchable redness to coccyx/buttocks.   TESTS/PROCEDURES: NA  D/C DATE: Pending TCU placement.   Discharge Barriers: Placement  OTHER IMPORTANT INFO: SW following. Bed alarm on for safety. Reassurance provided.     Pt alert & oriented to self. VSS on Rm Air. C/o BLE pain @ NOC; legs elevated on pillows, weight shifted. Intermittent calling out for various requests from staff; reassurance & assistance provided. Continues w/ resolving bruises to abdominal area. Pt resting comfortably at this time. Call-light within reach. Continue to monitor.   "

## 2022-10-06 NOTE — PROGRESS NOTES
[facility]    Medicine Progress Note - Hospitalist Service    Date of Admission:  7/13/2022    Assessment & Plan          Cristy Bailey is a 77 year old female with a PMHx significant for morbid obesity, hypertension, and hyperlipidemia, who presented to [facility] 7/13/2022 with complete left-sided paralysis, left-sided facial droop, and difficulty speaking. CTA head remarkable for right MCA occlusion. She was given tenecteplase and subsequently transferred to [facility] 7/13/2022 for thrombectomy.   Hospital stay complicated by flank wound which required surgical debridement and placement of wound vac and findings LTC placement.     Acute R MCA ischemic stroke with edema and right to left midline shift  S/p tenecteplase and subsequent R MCA mechanical thrombectomy 7/13/22  * Presented to [facility] 7/13 with complete paralysis, left-sided facial droop, and aphasia. Code stroke initiated. Head CT 7/13 showed signs of an evolving R MCA infarct. CTA head 7/13 showed a right carotid terminus occlusion, right middle cerebral artery M1  segment occlusion with poor opacification of the more distal right MCA branches/poor collateral flow. CTA neck 7/13 negative for acute occlusions. Pt given tenecteplase 7/13 at 13:11. Noted to be agitated and was subsequently intubated given need for procedure.   * Subsequently transferred to [facility] 7/13/2022 where she underwent above procedure.   7/14: Extubated. Head CT 7/14 showed evolution of acute infarct involving the R MCA distribution with increased swelling, cortical effacement, and new mild right-to-left midline shift; questionable hypoattenuation involving the bilateral occipital lobes which could be artifactual.   * Additional stroke workup pursued this stay -- echo 7/14 showed EF 50%, grade 1 diastolic dysfunction   * Started ASA and atorvastatin per stroke team.   * Repeat head CT 7/15 showed evolving R MCA stroke with areas of  edema, overall stable.   plan  -- continue full dose aspirin  -- goal SBP <140/90 and met   -- monitored on telemetry for first several wks of hospital stay without abnl rhythm so can likely defer prior recommendations for 30d cardiac event monitor at discharge  -- will need to follow up with [facility] in 6-8 wks  -- Waiting on placement     Severe malnutrition in context of acute illness and chronic disease  * Nutritionist following. Appetite poor this stay, needing encouragement to take po.   *there is some discussion about needing feeding support, but patient does not want to have a feeding tube.  * 70 lb weight loss in the last few months (likely lower in the context of diuresis for CHF and inherent error in measuring)  Plan  - encourage oral intake  - patient does not want a feeding tube at this point and her health care agent dulce maria agrees with this, consistent with previous wishes     Anxiety   Cognitive impairment, confusion as above.   * As stay has progressed, patient has consistently been oriented x2 (to self, location), unable to state the year. Also noted to have intermittent situational confusion. States family members have been visiting when they have not.   * Ongoing issues with anxiety this stay -- had been on regimen of Seroquel 12.5mg at dinner and 25mg HS with 12.5mg po q6h prn available. Mirtazapine 7.5mg on 8/15 evening given decreased appetite  * Psych consulted for assistance with ongoing mgmt -- seen on 8/16 and meds adjusted. Seroquel HS changed to Abilify 5mg HS. Recommended Zyptexa 5mg q6h prn for breakthrough insomnia/agitation while going off Seroquel. Consider uptitration of Abilify per psych recs. Advised uptitration of sertraline (25mg x3d then increase to 50mg daily beginning 8/20) and Remeron stopped. Also advised to liberalize Ativan to 0.5mg q6h prn.    * Seen by psych on 8/24, Abilify increased from 5mg to 10mg.  * PRN ativan stopped on 08/27/22 as leading to increased confusion.  Hydroxyzine stopped  as well.   -- cont current regimen of meds including Abilify 5mg every evening, Zyprexa 2.5mg HS and sertraline 50mg daily  -- cont prns per psych including Zyprexa 5mg q6h prn     Goals of care, failure to thrive  Discussed with patient and significant other/Health care agent Leo. She remains confused. He reiterated her wishes for DNR/DNI and no feeding tubes   We discussed concern about overall prognosis, with her eating 0-25% of her meals and nutrition recommending alternative forms of nutritions  She has lost 70 lbs since admit (some of that is probably related to diuresis and some related to variable weights in hospital, but still significant)  Overall, placement in a facility has been difficult, and there is concern about her continued deteriorating under these circumstances  Plan  - now DNR/DNI  - see discussion regarding weight loss above  Palliative care meeting with Leo 9/28.     Urinary retention: Resolved  * Retaining urine on 8/14. UA WNL, not worrisome for infection  * Requiring straight cath x1 on 8/20 PM  * Good UOP, no longer requiring straight cath. No evidence of obstruction on PVR.      Dysphagia due to CVA: Resolved  * Seen by SLP and noted with dysphagia. Initially required some intermittent fluid boluses.   * Was eventually able to advance to regular diet w/thin liquids     Dyslipidemia  * FLP this stay showed tot cholest 163, HDL 47, LDL 91, .   * Started on atorvastatin 40 mg daily     Volume overload dt diastolic CHF exacerbation: Improved  Essential hypertension  Hypokalemia, recurrent on lasix, and often refusing potassium   * Not on/needing meds PTA.   * As above, echo this stay showed EF 50% with grade I diastolic dysfunction; RV not well visualized but appeared mild-moderately dilated with global systolic function probably mildly reduced.   * BPs were initially soft this stay and required IVFs. BPs improved.   * Developed pedal edema required IV Lasix.  Ultimately transition to oral Lasix.  * Started on lisinopril this stay  * BPs improved during stay.   * Lasix decreased from 40mg in AM and 20mg in PM to 20mg BID on 9/3 and added spironolactone 25mg daily     -- reduced lisinopril dose to 5mg daily with parameters on 9/14, will hold lisinopril completely for now as she needs diuretics  -- cont Lasix 20mg po BID  -- reduced spironolactone to 12.5mg daily with parameters on 9/14  -- cont KCl 20mEq po daily  -- f/u bp and adjust closely as indicated     Hypophosphatemia: Resolved  Hypokalemia     Refusing replacement    Encourage bananas     Prolonged QTc   * EKG on 7/13 showed QTc 494.   * Repeat EKG on 7/30 (while on Levaquin) showed QTc table at 475. Has since completed course of abx as below.  * Had been monitoring on telemetry this stay. No concerning findings so tele was ultimately dc'd 8/19     Chronic bilateral LE edema with chronic venous stasis dermatitis and chronic skin changes  Hx of LLE cellulitis  * Was hospitalized in 11/2021 for sepsis dt to pneumonia and LLE cellulitis.   * During this stay, BLE noted to be patty and edematous, no unilateral leg swelling appreciated; LLE had patchy areas of erythema, no fluctuance, no painful areas with palpation; legs warm to touch. Lymphedema consulted.   -- cont LE elevation, lymphedema wraps     Lower back/R flank wound/abscess, suspect dt pressure type injury, s/p surgical debridement on 7/30/22 and placement of wound vac on 8/2/22,  OA  Hx of bilateral hip replacements   Hx of chronic neck and back pain  * Pt with significant back pain early on in hospital stay. Was requiring IV hydromorphone.  Dose had to be decreased dt somnolence.  * MRI L-spine in 2018 showed multilevel degenerative changes with mild central stenosis. Patient has chronic back pain, reports having it over the last 2 years.   * During this stay, patient was noted with 5-6cm by 2-3cm wound with swelling/fluid/blistering in a fold of her lower  back, did not appear infected; this seemed to be the source of her pain. Padded dressing placed and WOC RN ordered. Pain initially improved.   * Was placed on course of Augmentin on 7/24.   * On 7/26, was re-evaluated by WOC RN. Wound appeared more erythematous and purulence expressed. Worsening with shear stress from lift. Procal <0.05, WBC WNL. Abx changed to IV clindamycin.  * Wound cultures obtained. On 7/28, wound grew proteus and staph aureus (MRSA neg). US neg for abscess.   * Lost IV access on 7/28 so abx were changed to oral clindamycin and oral levaquin. IV access re-established but was continued on oral abx.  * General surgery consulted, ultimately underwent excisional debridement of R flank abscess in OR on 7/30. Intraop cultures showed polymicrobial growth with proteus mirabilis x2 strains (both pan-sensitive), corynebacterium striatum and enterococcus faecalis.  * Wound vac placed per general surgery on 8/2 >> subsequently removed during stay.  * ID consulted on 8/2 and abx narrowed to Augmentin alone, completed an additional 5 days of treatment on 8/7.      -- wound per WOC RN, follows weekly  -- prns available for pain  -- cont regular repositioning     Pressure Injury, left Medial thigh/groin  Pressure Injury Stage: Deep Tissue Pressure Injury (DTPI), hospital acquired, now HAPI stage 2 pressure injury device related purwick external catheter tubing (not the purwick device itself)  - Wound care following     Pressure injury bilateral buttocks  * Noted by nursing on 09/02/22. WOC RN following as above.      Suspected meningioma left parietal region, incidentally seen on admission CT on 7/13  * Head CT 7/13 showed a calcified extra-axial mass overlying the left parietal region measuring 1.4 cm, potentially representing a meningioma.  * Will need serial monitoring OP after discharge.      Anemia, suspect dilutional component: Resolved  * Hgb normal on admit. Hgb 11.5 on 7/16. No overt clinical signs of  major bleeding.  * Hgb now stable at 12-13      ntertriginous dermatitis  * Chronic and stable, cont clotrimazole powder     Constipation  * Continue sched bowel regimen     Morbid obesity  * BMI 59 on admission. Recommend aggressive dietary and lifestyle modifications as condition improves     Suspected sleep apnea  * O2 drop to mid 80s overnight 8/14. Briefly placed on 4LPM, which she then removed and then slept okay with sats in 90s thereafter.  * Recommend sleep study as outpatient     Severe malnutrition. In Context of:  Acute illness or injury  Chronic illness or disease  Malnutrition: (8/31)   % Weight Loss:  > 5% in 1 month (severe malnutrition) - significant loss over this admit  % Intake:  </= 50% for >/= 5 days (severe malnutrition) - consistent this admit x49 days  Subcutaneous Fat Loss:  Orbital region moderate depletion - visual, per 8/16 note  Muscle Loss:  Temporal region moderate depletion - visual, per 8/16 note  Fluid Retention:  Mild generalized edema  - dietitian following, recommend alternative feeding source? Not clear she would tolerate a tube feed with her confusion, will monitor, perhaps consider some carb counting    10/6- stable, trying to take oral KCl     Diet: Advance Diet as Tolerated  Regular Diet Adult Thin Liquids (level 0) (Upright position, alert, and assist as needed)  Room Service    DVT Prophylaxis: Enoxaparin (Lovenox) SQ  [place] Catheter: Not present  Central Lines: None  Cardiac Monitoring: None  Code Status: No CPR- Do NOT Intubate      Disposition Plan     Expected Discharge Date: 10/07/2022    Discharge Delays: Placement - LTC  *Medically Ready for Discharge    Discharge Comments: Multiple referrerals outl  Did receive her 2nd covid shot.. Leo meeting with Palliative on the 28??  TCU placement        The patient's care was discussed with the Patient /nurse    Donal Rodriguez MD  Hospitalist Service  [facility]  Securely message with the  Your Image [employer] Web Console (learn more here)  Text page via [employer] Paging/Directory         Clinically Significant Risk Factors Present on Admission                      ______________________________________________________________________    Interval History   Stable overnight, no new complaints     Data reviewed today: I reviewed all medications, new labs and imaging results over the last 24 hours. I personally reviewed no images or EKG's today.    Physical Exam   Vital Signs: Temp: 97.5  F (36.4  C) Temp src: Axillary BP: 106/59 Pulse: 65   Resp: 20 SpO2: 95 % O2 Device: None (Room air)    Weight: 288 lbs 9.31 oz  Constitutional: awake, alert, cooperative, no apparent distress  Neuropsychiatric: General: normal, calm and normal eye contact    Data   Recent Labs   Lab 10/06/22  0814 10/05/22  0927 10/04/22  1748 10/04/22  1128 10/03/22  1143 10/01/22  1159 09/30/22  0850   WBC  --   --   --   --   --   --  4.9   HGB  --   --   --   --   --   --  13.8   MCV  --   --   --   --   --   --  90   PLT  --   --   --   --  291  --  266   NA  --   --   --   --   --   --  138   POTASSIUM 3.3* 3.8 3.9   < > 3.4   < > 3.7  3.7   CHLORIDE  --   --   --   --   --   --  104   CO2  --   --   --   --   --   --  28   BUN  --   --   --   --   --   --  19   CR  --   --   --   --   --   --  0.72   ANIONGAP  --   --   --   --   --   --  6   ASHIA  --   --   --   --   --   --  8.5   GLC  --   --   --   --   --   --  93    < > = values in this interval not displayed.     No results found for this or any previous visit (from the past 24 hour(s)).  Medications     - MEDICATION INSTRUCTIONS -         ARIPiprazole  10 mg Oral QPM     aspirin  325 mg Oral Daily     atorvastatin  40 mg Oral QPM     enoxaparin ANTICOAGULANT  40 mg Subcutaneous Q12H     furosemide  20 mg Oral BID     [Held by provider] lisinopril  5 mg Oral Daily     miconazole   Topical BID     multivitamins w/minerals  15 mL Oral Daily     OLANZapine  2.5 mg Oral At Bedtime      polyethylene glycol  17 g Oral Daily     potassium chloride  20 mEq Oral Daily     potassium chloride  40 mEq Oral Once     senna-docusate  1-2 tablet Oral BID     sertraline  50 mg Oral Daily     sodium chloride (PF)  3 mL Intracatheter Q8H     spironolactone  12.5 mg Oral Daily

## 2022-10-06 NOTE — PLAN OF CARE
"Goal Outcome Evaluation:    Goal Outcome Evaluation:  Summary: CVA R MCA  DATE & TIME: 10/5/22-10/6/22 [id number]  Cognitive Concerns/ Orientation : A&O to self and place. Occasionally calling out \"help me.\" Forgetful. Reminded/educated to use call-light. Safety checks/rounded on freq.   BEHAVIOR & AGGRESSION TOOL COLOR: Green     ABNL VS: VSS on RA. Q12 vitals  MOBILITY: Up with assist of 2/lift. On pulsate mattress. Turn Q2hr. Pt. Refusing repositioning. BLE elevated, weight shifted.  PAIN MANAGMENT: C/o BLE pain; offered repo, legs elevated  DIET: Regular. Poor/no appetite; Oral fluids encouraged.   BOWEL/BLADDER: Incontinent.   ABNL LAB/BG: K 3.8, Mg 2.0 recheck both AM labs  DRAIN/DEVICES: LUE PIV SL, WDL.   TELEMETRY RHYTHM: NA  SKIN: Mepilex Dressing to right flank/back CDI. Blanchable redness to coccyx/buttocks.   TESTS/PROCEDURES: NA  D/C DATE: Pending TCU placement.   Discharge Barriers: Placement  OTHER IMPORTANT INFO: SW following. Bed alarm on for safety. Reassurance provided.                 "

## 2022-10-06 NOTE — PLAN OF CARE
"Goal Outcome Evaluation:    Goal Outcome Evaluation:  Summary: CVA R MCA  DATE & TIME: 10/6/22 [id number]  Cognitive Concerns/ Orientation : A&O to self and place. Occasionally calling out \"help me.\" Forgetful. Reminded/educated to use call-light.   BEHAVIOR & AGGRESSION TOOL COLOR: Green     ABNL VS: VSS on RA. Q12 vitals  MOBILITY: Up with assist of 2/lift. On pulsate mattress. Turn Q2hr. Pt. Refusing repositioning. BLE elevated. Unit(s)p to chair for lunch  PAIN MANAGMENT: Denies except discomfort with transferring   DIET: Regular. Poor appetite; Oral fluids encouraged.   BOWEL/BLADDER: Incontinent BM this shift  ABNL LAB/BG: Mg 1.8, K 3.8, next check both AM labs  DRAIN/DEVICES: LUE PIV SL, WDL.   TELEMETRY RHYTHM: NA  SKIN: Mepilex Dressing to right flank/back CDI. Blanchable redness to coccyx/buttocks.   TESTS/PROCEDURES: NA  D/C DATE: Pending TCU placement.   Discharge Barriers: Placement  OTHER IMPORTANT INFO: SW following. Bed alarm on for safety. Reassurance provided. Pt. Was able to take oral potassium replacement dissolved in a small bite of pudding with encouragement from significant other. PRN Zyprexa given x1 this afternoon                "

## 2022-10-07 NOTE — PROGRESS NOTES
"SPIRITUAL HEALTH SERVICES Progress Note  [facility] [address]    Saw ptnt per follow-up.     Distress- Ptnt became tearful as she expressed how scared she felt. \"I'm scared to walk because she was afraid of falling. \"I'm scared to drink anything because she did not know what they put in her drinks.\" I talked with her about her progress taking more steps everyday.     Comfort- I provided her with a stuffed animal (named \"Chewie\") to hug whenever she felt alone. We agreed that she would watch him for me until the next time I came to visit. She talked about her 3 children and how her son looked after his sisters. I read her the scripture cards I provided. I plan to continue to follow-up with her.      services remain available.     DILMA BeckDiv  Chaplain Resident   [phone]   "

## 2022-10-07 NOTE — PLAN OF CARE
"Goal Outcome Evaluation:    Goal Outcome Evaluation:  Summary: CVA R MCA  DATE & TIME: 10/6/22-10/7/22 [id number]  Cognitive Concerns/ Orientation : A&O to self and place. Occasionally calling out \"help me.\" Forgetful. Reminded/educated to use call-light.   BEHAVIOR & AGGRESSION TOOL COLOR: Green     ABNL VS: VSS on RA. Q12 vitals  MOBILITY: Up with assist of 2/lift. On pulsate mattress. Turn Q2hr. Pt. Refusing repositioning at times. BLE elevated.  PAIN MANAGMENT: Denies except discomfort with transferring   DIET: Regular. Poor appetite; Oral fluids encouraged.   BOWEL/BLADDER: Incontinent  ABNL LAB/BG: Mg 1.8, K 3.8, recheck in AM   DRAIN/DEVICES: LUE PIV SL, WDL.   TELEMETRY RHYTHM: NA  SKIN: Mepilex Dressing to right flank/back CDI. Blanchable redness to coccyx/buttocks.   TESTS/PROCEDURES: NA  D/C DATE: Pending TCU placement.   Discharge Barriers: Placement  OTHER IMPORTANT INFO: SW following. Bed alarm on for safety.                "

## 2022-10-07 NOTE — PROGRESS NOTES
[facility]    Medicine Progress Note - Hospitalist Service    Date of Admission:  7/13/2022    Assessment & Plan          Cristy Bailey is a 77 year old female with a PMHx significant for morbid obesity, hypertension, and hyperlipidemia, who presented to [facility] 7/13/2022 with complete left-sided paralysis, left-sided facial droop, and difficulty speaking. CTA head remarkable for right MCA occlusion. She was given tenecteplase and subsequently transferred to [facility] 7/13/2022 for thrombectomy.   Hospital stay complicated by flank wound which required surgical debridement and placement of wound vac and findings LTC placement.     Acute R MCA ischemic stroke with edema and right to left midline shift  S/p tenecteplase and subsequent R MCA mechanical thrombectomy 7/13/22  * Presented to [facility] 7/13 with complete paralysis, left-sided facial droop, and aphasia. Code stroke initiated. Head CT 7/13 showed signs of an evolving R MCA infarct. CTA head 7/13 showed a right carotid terminus occlusion, right middle cerebral artery M1  segment occlusion with poor opacification of the more distal right MCA branches/poor collateral flow. CTA neck 7/13 negative for acute occlusions. Pt given tenecteplase 7/13 at 13:11. Noted to be agitated and was subsequently intubated given need for procedure.   * Subsequently transferred to [facility] 7/13/2022 where she underwent above procedure.   7/14: Extubated. Head CT 7/14 showed evolution of acute infarct involving the R MCA distribution with increased swelling, cortical effacement, and new mild right-to-left midline shift; questionable hypoattenuation involving the bilateral occipital lobes which could be artifactual.   * Additional stroke workup pursued this stay -- echo 7/14 showed EF 50%, grade 1 diastolic dysfunction   * Started ASA and atorvastatin per stroke team.   * Repeat head CT 7/15 showed evolving R MCA stroke with areas of  edema, overall stable.   plan  -- continue full dose aspirin  -- goal SBP <140/90 and met   -- monitored on telemetry for first several wks of hospital stay without abnl rhythm so can likely defer prior recommendations for 30d cardiac event monitor at discharge  -- will need to follow up with [facility] in 6-8 wks  -- Waiting on placement     Severe malnutrition in context of acute illness and chronic disease  * Nutritionist following. Appetite poor this stay, needing encouragement to take po.   *there is some discussion about needing feeding support, but patient does not want to have a feeding tube.  * 70 lb weight loss in the last few months (likely lower in the context of diuresis for CHF and inherent error in measuring)  Plan  - encourage oral intake  - patient does not want a feeding tube at this point and her health care agent dulce maria agrees with this, consistent with previous wishes     Anxiety   Cognitive impairment, confusion as above.   * As stay has progressed, patient has consistently been oriented x2 (to self, location), unable to state the year. Also noted to have intermittent situational confusion. States family members have been visiting when they have not.   * Ongoing issues with anxiety this stay -- had been on regimen of Seroquel 12.5mg at dinner and 25mg HS with 12.5mg po q6h prn available. Mirtazapine 7.5mg on 8/15 evening given decreased appetite  * Psych consulted for assistance with ongoing mgmt -- seen on 8/16 and meds adjusted. Seroquel HS changed to Abilify 5mg HS. Recommended Zyptexa 5mg q6h prn for breakthrough insomnia/agitation while going off Seroquel. Consider uptitration of Abilify per psych recs. Advised uptitration of sertraline (25mg x3d then increase to 50mg daily beginning 8/20) and Remeron stopped. Also advised to liberalize Ativan to 0.5mg q6h prn.    * Seen by psych on 8/24, Abilify increased from 5mg to 10mg.  * PRN ativan stopped on 08/27/22 as leading to increased confusion.  Hydroxyzine stopped  as well.   -- cont current regimen of meds including Abilify 5mg every evening, Zyprexa 2.5mg HS and sertraline 50mg daily  -- cont prns per psych including Zyprexa 5mg q6h prn     Goals of care, failure to thrive  Discussed with patient and significant other/Health care agent Leo. She remains confused. He reiterated her wishes for DNR/DNI and no feeding tubes   We discussed concern about overall prognosis, with her eating 0-25% of her meals and nutrition recommending alternative forms of nutritions  She has lost 70 lbs since admit (some of that is probably related to diuresis and some related to variable weights in hospital, but still significant)  Overall, placement in a facility has been difficult, and there is concern about her continued deteriorating under these circumstances  Plan  - now DNR/DNI  - see discussion regarding weight loss above  Palliative care meeting with Leo 9/28.     Urinary retention: Resolved  * Retaining urine on 8/14. UA WNL, not worrisome for infection  * Requiring straight cath x1 on 8/20 PM  * Good UOP, no longer requiring straight cath. No evidence of obstruction on PVR.      Dysphagia due to CVA: Resolved  * Seen by SLP and noted with dysphagia. Initially required some intermittent fluid boluses.   * Was eventually able to advance to regular diet w/thin liquids     Dyslipidemia  * FLP this stay showed tot cholest 163, HDL 47, LDL 91, .   * Started on atorvastatin 40 mg daily     Volume overload dt diastolic CHF exacerbation: Improved  Essential hypertension  Hypokalemia, recurrent on lasix, and often refusing potassium   * Not on/needing meds PTA.   * As above, echo this stay showed EF 50% with grade I diastolic dysfunction; RV not well visualized but appeared mild-moderately dilated with global systolic function probably mildly reduced.   * BPs were initially soft this stay and required IVFs. BPs improved.   * Developed pedal edema required IV Lasix.  Ultimately transition to oral Lasix.  * Started on lisinopril this stay  * BPs improved during stay.   * Lasix decreased from 40mg in AM and 20mg in PM to 20mg BID on 9/3 and added spironolactone 25mg daily     -- reduced lisinopril dose to 5mg daily with parameters on 9/14, will hold lisinopril completely for now as she needs diuretics  -- cont Lasix 20mg po BID  -- reduced spironolactone to 12.5mg daily with parameters on 9/14  -- cont KCl 20mEq po daily  -- f/u bp and adjust closely as indicated     Hypophosphatemia: Resolved  Hypokalemia     Refusing replacement    Encourage bananas     Prolonged QTc   * EKG on 7/13 showed QTc 494.   * Repeat EKG on 7/30 (while on Levaquin) showed QTc table at 475. Has since completed course of abx as below.  * Had been monitoring on telemetry this stay. No concerning findings so tele was ultimately dc'd 8/19     Chronic bilateral LE edema with chronic venous stasis dermatitis and chronic skin changes  Hx of LLE cellulitis  * Was hospitalized in 11/2021 for sepsis dt to pneumonia and LLE cellulitis.   * During this stay, BLE noted to be patty and edematous, no unilateral leg swelling appreciated; LLE had patchy areas of erythema, no fluctuance, no painful areas with palpation; legs warm to touch. Lymphedema consulted.   -- cont LE elevation, lymphedema wraps     Lower back/R flank wound/abscess, suspect dt pressure type injury, s/p surgical debridement on 7/30/22 and placement of wound vac on 8/2/22,  OA  Hx of bilateral hip replacements   Hx of chronic neck and back pain  * Pt with significant back pain early on in hospital stay. Was requiring IV hydromorphone.  Dose had to be decreased dt somnolence.  * MRI L-spine in 2018 showed multilevel degenerative changes with mild central stenosis. Patient has chronic back pain, reports having it over the last 2 years.   * During this stay, patient was noted with 5-6cm by 2-3cm wound with swelling/fluid/blistering in a fold of her lower  back, did not appear infected; this seemed to be the source of her pain. Padded dressing placed and WOC RN ordered. Pain initially improved.   * Was placed on course of Augmentin on 7/24.   * On 7/26, was re-evaluated by WOC RN. Wound appeared more erythematous and purulence expressed. Worsening with shear stress from lift. Procal <0.05, WBC WNL. Abx changed to IV clindamycin.  * Wound cultures obtained. On 7/28, wound grew proteus and staph aureus (MRSA neg). US neg for abscess.   * Lost IV access on 7/28 so abx were changed to oral clindamycin and oral levaquin. IV access re-established but was continued on oral abx.  * General surgery consulted, ultimately underwent excisional debridement of R flank abscess in OR on 7/30. Intraop cultures showed polymicrobial growth with proteus mirabilis x2 strains (both pan-sensitive), corynebacterium striatum and enterococcus faecalis.  * Wound vac placed per general surgery on 8/2 >> subsequently removed during stay.  * ID consulted on 8/2 and abx narrowed to Augmentin alone, completed an additional 5 days of treatment on 8/7.      -- wound per WOC RN, follows weekly  -- prns available for pain  -- cont regular repositioning     Pressure Injury, left Medial thigh/groin  Pressure Injury Stage: Deep Tissue Pressure Injury (DTPI), hospital acquired, now HAPI stage 2 pressure injury device related purwick external catheter tubing (not the purwick device itself)  - Wound care following     Pressure injury bilateral buttocks  * Noted by nursing on 09/02/22. WOC RN following as above.      Suspected meningioma left parietal region, incidentally seen on admission CT on 7/13  * Head CT 7/13 showed a calcified extra-axial mass overlying the left parietal region measuring 1.4 cm, potentially representing a meningioma.  * Will need serial monitoring OP after discharge.      Anemia, suspect dilutional component: Resolved  * Hgb normal on admit. Hgb 11.5 on 7/16. No overt clinical signs of  major bleeding.  * Hgb now stable at 12-13      ntertriginous dermatitis  * Chronic and stable, cont clotrimazole powder     Constipation  * Continue sched bowel regimen     Morbid obesity  * BMI 59 on admission. Recommend aggressive dietary and lifestyle modifications as condition improves     Suspected sleep apnea  * O2 drop to mid 80s overnight 8/14. Briefly placed on 4LPM, which she then removed and then slept okay with sats in 90s thereafter.  * Recommend sleep study as outpatient     Severe malnutrition. In Context of:  Acute illness or injury  Chronic illness or disease  Malnutrition: (8/31)   % Weight Loss:  > 5% in 1 month (severe malnutrition) - significant loss over this admit  % Intake:  </= 50% for >/= 5 days (severe malnutrition) - consistent this admit x49 days  Subcutaneous Fat Loss:  Orbital region moderate depletion - visual, per 8/16 note  Muscle Loss:  Temporal region moderate depletion - visual, per 8/16 note  Fluid Retention:  Mild generalized edema  - dietitian following, recommend alternative feeding source? Not clear she would tolerate a tube feed with her confusion, will monitor, perhaps consider some carb counting    10/7- no new issues, continue present care     Diet: Advance Diet as Tolerated  Regular Diet Adult Thin Liquids (level 0) (Upright position, alert, and assist as needed)  Room Service    DVT Prophylaxis: Enoxaparin (Lovenox) SQ  [place] Catheter: Not present  Central Lines: None  Cardiac Monitoring: None  Code Status: No CPR- Do NOT Intubate      Disposition Plan      Expected Discharge Date: 10/11/2022    Discharge Delays: Placement - LTC  *Medically Ready for Discharge    Discharge Comments: Multiple referrerals outl  Did receive her 2nd covid shot  LTC placement        The patient's care was discussed with the Patient     Donal Rodriguez MD  Hospitalist Service  [facility]  Securely message with the Vocera Web Console (learn more here)  Text  page via [facility] Paging/Directory         Clinically Significant Risk Factors Present on Admission                      ______________________________________________________________________    Interval History   Stable overnight, no new complaints     Data reviewed today: I reviewed all medications, new labs and imaging results over the last 24 hours. I personally reviewed no images or EKG's today.    Physical Exam   Vital Signs: Temp: 97.5  F (36.4  C) Temp src: Oral BP: 95/58 Pulse: 81   Resp: 18 SpO2: 92 % O2 Device: None (Room air)    Weight: 289 lbs 9.6 oz  Constitutional: awake, alert, cooperative, no apparent distress  Neuropsychiatric: General: normal, calm and normal eye contact    Data   Recent Labs   Lab 10/07/22  0813 10/06/22  1510 10/06/22  0814 10/04/22  1128 10/03/22  1143   PLT  --  254  --   --  291   POTASSIUM 3.4 3.8 3.3*   < > 3.4   CR 0.68  --   --   --   --     < > = values in this interval not displayed.     No results found for this or any previous visit (from the past 24 hour(s)).  Medications     - MEDICATION INSTRUCTIONS -         ARIPiprazole  10 mg Oral QPM     aspirin  325 mg Oral Daily     atorvastatin  40 mg Oral QPM     enoxaparin ANTICOAGULANT  40 mg Subcutaneous Q12H     furosemide  20 mg Oral BID     [Held by provider] lisinopril  5 mg Oral Daily     miconazole   Topical BID     multivitamins w/minerals  15 mL Oral Daily     OLANZapine  2.5 mg Oral At Bedtime     polyethylene glycol  17 g Oral Daily     potassium chloride  20 mEq Oral Daily     senna-docusate  1-2 tablet Oral BID     sertraline  50 mg Oral Daily     sodium chloride (PF)  3 mL Intracatheter Q8H     spironolactone  12.5 mg Oral Daily

## 2022-10-07 NOTE — PLAN OF CARE
"Goal Outcome Evaluation:    Plan of Care Reviewed With: patient     Overall Patient Progress: no change       Goal Outcome Evaluation:  Summary: CVA R MCA  DATE & TIME: 10/7/22 5875-1227  Cognitive Concerns/ Orientation : A&O to self and place. Occasionally calling out \"help me.\" Forgetful. Needs frequent Reminders/ education to use call-light.   BEHAVIOR & AGGRESSION TOOL COLOR: Green     ABNL VS: VSS on RA. Q12 vitals  MOBILITY: Up with assist of 2/lift. On pulsate mattress. Turn Q2hr. Pt. Refusing repositioning at times. BLE elevated.  PAIN MANAGMENT: Denies except discomfort with transferring   DIET: Regular. Poor appetite; Oral fluids encouraged. Refused lunch, pt requested cold cereal with milk and bela food cake. Patient said food was \"too cold\" for her to eat.  BOWEL/BLADDER: Incontinent, large BM this shift  ABNL LAB/BG: Mg 2.0, K 3.4, recheck in AM   DRAIN/DEVICES: LUE PIV SL, WDL.   TELEMETRY RHYTHM: NA  SKIN: Mepilex Dressing to right flank/back CDI. Blanchable redness to coccyx/buttocks.   TESTS/PROCEDURES: NA  D/C DATE: Pending TCU placement.   Discharge Barriers: Placement  OTHER IMPORTANT INFO: SW following. Bed alarm on for safety.  Pt didn't want potassium pill this AM, tried dissolving, patient didn't want to drink hot water- put dissolved pill in cooler water.      "

## 2022-10-08 NOTE — PROGRESS NOTES
[facility]    Medicine Progress Note - Hospitalist Service    Date of Admission:  7/13/2022  Date of Service: 10/08/2022    Assessment & Plan         Cristy Bailey is a 77 year old female with a PMHx significant for morbid obesity, hypertension, and hyperlipidemia, who presented to [facility] 7/13/2022 with complete left-sided paralysis, left-sided facial droop, and difficulty speaking. CTA head remarkable for right MCA occlusion. She was given tenecteplase and subsequently transferred to [facility] 7/13/2022 for thrombectomy.   Hospital stay complicated by flank wound which required surgical debridement and placement of wound vac and findings LTC placement.     Acute R MCA ischemic stroke with edema and right to left midline shift  S/p tenecteplase and subsequent R MCA mechanical thrombectomy 7/13/22  * Presented to [facility] 7/13 with complete paralysis, left-sided facial droop, and aphasia. Code stroke initiated. Head CT 7/13 showed signs of an evolving R MCA infarct. CTA head 7/13 showed a right carotid terminus occlusion, right middle cerebral artery M1  segment occlusion with poor opacification of the more distal right MCA branches/poor collateral flow. CTA neck 7/13 negative for acute occlusions. Pt given tenecteplase 7/13 at 13:11. Noted to be agitated and was subsequently intubated given need for procedure.   * Subsequently transferred to [facility] 7/13/2022 where she underwent above procedure.   7/14: Extubated. Head CT 7/14 showed evolution of acute infarct involving the R MCA distribution with increased swelling, cortical effacement, and new mild right-to-left midline shift; questionable hypoattenuation involving the bilateral occipital lobes which could be artifactual.   * Additional stroke workup pursued this stay -- echo 7/14 showed EF 50%, grade 1 diastolic dysfunction   * Started ASA and atorvastatin per stroke team.   * Repeat head CT 7/15 showed evolving R  MCA stroke with areas of edema, overall stable.  Plan:  -- Continue full dose aspirin  -- Goal SBP <140/90 and met   -- Monitored on telemetry for first several wks of hospital stay without abnl rhythm so can likely defer prior recommendations for 30d cardiac event monitor at discharge  -- Will need to follow up with [facility] in 6-8 wks  -- Waiting on placement    Vaginal Bleeding  Assessment: vaginal bleeding last couple days. Pelvis US shows abnormal endometrial thickening to 14 mm. Hgb stable.   Plan:  - OB/GYN consulted  - CBC in AM     Severe malnutrition in context of acute illness and chronic disease  * Nutritionist following. Appetite poor this stay, needing encouragement to take po.   *there is some discussion about needing feeding support, but patient does not want to have a feeding tube.  * 70 lb weight loss in the last few months (likely lower in the context of diuresis for CHF and inherent error in measuring)  Plan  - encourage oral intake  - patient does not want a feeding tube at this point and her health care agent dulce maria agrees with this, consistent with previous wishes     Anxiety   Cognitive impairment, confusion as above.   * As stay has progressed, patient has consistently been oriented x2 (to self, location), unable to state the year. Also noted to have intermittent situational confusion. States family members have been visiting when they have not.   * Ongoing issues with anxiety this stay -- had been on regimen of Seroquel 12.5mg at dinner and 25mg HS with 12.5mg po q6h prn available. Mirtazapine 7.5mg on 8/15 evening given decreased appetite  * Psych consulted for assistance with ongoing mgmt -- seen on 8/16 and meds adjusted. Seroquel HS changed to Abilify 5mg HS. Recommended Zyptexa 5mg q6h prn for breakthrough insomnia/agitation while going off Seroquel. Consider uptitration of Abilify per psych recs. Advised uptitration of sertraline (25mg x3d then increase to 50mg daily beginning 8/20) and  Remeron stopped. Also advised to liberalize Ativan to 0.5mg q6h prn.    * Seen by psych on 8/24, Abilify increased from 5mg to 10mg.  * PRN ativan stopped on 08/27/22 as leading to increased confusion. Hydroxyzine stopped  as well.   -- cont current regimen of meds including Abilify 5mg every evening, Zyprexa 2.5mg HS and sertraline 50mg daily  -- cont prns per psych including Zyprexa 5mg q6h prn     Goals of care, failure to thrive  Discussed with patient and significant other/Health care agent Leo. She remains confused. He reiterated her wishes for DNR/DNI and no feeding tubes   We discussed concern about overall prognosis, with her eating 0-25% of her meals and nutrition recommending alternative forms of nutritions  She has lost 70 lbs since admit (some of that is probably related to diuresis and some related to variable weights in hospital, but still significant)  Overall, placement in a facility has been difficult, and there is concern about her continued deteriorating under these circumstances  Plan  - now DNR/DNI  - see discussion regarding weight loss above  Palliative care meeting with Leo 9/28.     Urinary retention: Resolved  * Retaining urine on 8/14. UA WNL, not worrisome for infection  * Requiring straight cath x1 on 8/20 PM  * Good UOP, no longer requiring straight cath. No evidence of obstruction on PVR.      Dysphagia due to CVA: Resolved  * Seen by SLP and noted with dysphagia. Initially required some intermittent fluid boluses.   * Was eventually able to advance to regular diet w/thin liquids     Dyslipidemia  * FLP this stay showed tot cholest 163, HDL 47, LDL 91, .   * Started on atorvastatin 40 mg daily     Volume overload dt diastolic CHF exacerbation: Improved  Essential hypertension  Hypokalemia, recurrent on lasix, and often refusing potassium   * Not on/needing meds PTA.   * As above, echo this stay showed EF 50% with grade I diastolic dysfunction; RV not well visualized but  appeared mild-moderately dilated with global systolic function probably mildly reduced.   * BPs were initially soft this stay and required IVFs. BPs improved.   * Developed pedal edema required IV Lasix. Ultimately transition to oral Lasix.  * Started on lisinopril this stay  * BPs improved during stay.   * Lasix decreased from 40mg in AM and 20mg in PM to 20mg BID on 9/3 and added spironolactone 25mg daily     -- reduced lisinopril dose to 5mg daily with parameters on 9/14, will hold lisinopril completely for now as she needs diuretics  -- cont Lasix 20mg po BID  -- reduced spironolactone to 12.5mg daily with parameters on 9/14  -- cont KCl 20mEq po daily  -- f/u bp and adjust closely as indicated     Hypophosphatemia: Resolved  Hypokalemia     Refusing replacement    Encourage bananas     Prolonged QTc   * EKG on 7/13 showed QTc 494.   * Repeat EKG on 7/30 (while on Levaquin) showed QTc table at 475. Has since completed course of abx as below.  * Had been monitoring on telemetry this stay. No concerning findings so tele was ultimately dc'd 8/19     Chronic bilateral LE edema with chronic venous stasis dermatitis and chronic skin changes  Hx of LLE cellulitis  * Was hospitalized in 11/2021 for sepsis dt to pneumonia and LLE cellulitis.   * During this stay, BLE noted to be patty and edematous, no unilateral leg swelling appreciated; LLE had patchy areas of erythema, no fluctuance, no painful areas with palpation; legs warm to touch. Lymphedema consulted.   -- cont LE elevation, lymphedema wraps     Lower back/R flank wound/abscess, suspect dt pressure type injury, s/p surgical debridement on 7/30/22 and placement of wound vac on 8/2/22,  OA  Hx of bilateral hip replacements   Hx of chronic neck and back pain  * Pt with significant back pain early on in hospital stay. Was requiring IV hydromorphone.  Dose had to be decreased dt somnolence.  * MRI L-spine in 2018 showed multilevel degenerative changes with mild  central stenosis. Patient has chronic back pain, reports having it over the last 2 years.   * During this stay, patient was noted with 5-6cm by 2-3cm wound with swelling/fluid/blistering in a fold of her lower back, did not appear infected; this seemed to be the source of her pain. Padded dressing placed and WOC RN ordered. Pain initially improved.   * Was placed on course of Augmentin on 7/24.   * On 7/26, was re-evaluated by WOC RN. Wound appeared more erythematous and purulence expressed. Worsening with shear stress from lift. Procal <0.05, WBC WNL. Abx changed to IV clindamycin.  * Wound cultures obtained. On 7/28, wound grew proteus and staph aureus (MRSA neg). US neg for abscess.   * Lost IV access on 7/28 so abx were changed to oral clindamycin and oral levaquin. IV access re-established but was continued on oral abx.  * General surgery consulted, ultimately underwent excisional debridement of R flank abscess in OR on 7/30. Intraop cultures showed polymicrobial growth with proteus mirabilis x2 strains (both pan-sensitive), corynebacterium striatum and enterococcus faecalis.  * Wound vac placed per general surgery on 8/2 >> subsequently removed during stay.  * ID consulted on 8/2 and abx narrowed to Augmentin alone, completed an additional 5 days of treatment on 8/7.      -- wound per WOC RN, follows weekly  -- prns available for pain  -- cont regular repositioning     Pressure Injury, left Medial thigh/groin  Pressure Injury Stage: Deep Tissue Pressure Injury (DTPI), hospital acquired, now HAPI stage 2 pressure injury device related purwick external catheter tubing (not the purwick device itself)  - Wound care following     Pressure injury bilateral buttocks  * Noted by nursing on 09/02/22. WOC RN following as above.      Suspected meningioma left parietal region, incidentally seen on admission CT on 7/13  * Head CT 7/13 showed a calcified extra-axial mass overlying the left parietal region measuring 1.4 cm,  potentially representing a meningioma.  * Will need serial monitoring OP after discharge.      Anemia, suspect dilutional component: Resolved  * Hgb normal on admit. Hgb 11.5 on 7/16. No overt clinical signs of major bleeding.  * Hgb now stable at 12-13      ntertriginous dermatitis  * Chronic and stable, cont clotrimazole powder     Constipation  * Continue sched bowel regimen     Morbid obesity  * BMI 59 on admission. Recommend aggressive dietary and lifestyle modifications as condition improves     Suspected sleep apnea  * O2 drop to mid 80s overnight 8/14. Briefly placed on 4LPM, which she then removed and then slept okay with sats in 90s thereafter.  * Recommend sleep study as outpatient     Malnutrition:    - Level of malnutrition: Severe   Severe malnutrition. In Context of:  Acute illness or injury  Chronic illness or disease  Malnutrition: (8/31)   % Weight Loss:  > 5% in 1 month (severe malnutrition) - significant loss over this admit  % Intake:  </= 50% for >/= 5 days (severe malnutrition) - consistent this admit x49 days  Subcutaneous Fat Loss:  Orbital region moderate depletion - visual, per 8/16 note  Muscle Loss:  Temporal region moderate depletion - visual, per 8/16 note  Fluid Retention:  Mild generalized edema  - dietitian following, recommend alternative feeding source? Not clear she would tolerate a tube feed with her confusion, will monitor, perhaps consider some carb counting    10/7- no new issues, continue present care     Diet: Advance Diet as Tolerated  Regular Diet Adult Thin Liquids (level 0) (Upright position, alert, and assist as needed)  Room Service    DVT Prophylaxis: Enoxaparin (Lovenox) SQ  [place] Catheter: Not present  Central Lines: None  Cardiac Monitoring: None  Code Status: No CPR- Do NOT Intubate      Disposition Plan      Expected Discharge Date: 10/11/2022,  9:00 AM  Discharge Delays: Placement - LTC  *Medically Ready for Discharge    Discharge Comments: Multiple referrerals  outl  Did receive her 2nd covid shot  LTC placement        The patient's care was discussed with the Bedside Nurse, Patient and Patient's Family     Steffen Purcell MD  Hospitalist Service  [facility]  Securely message with the Vocera Web Console (learn more here)  Text page via [employer] Paging/Directory         Clinically Significant Risk Factors Present on Admission                      ______________________________________________________________________    Interval History      Stable overnight, no new complaints   Awaiting placement    Data reviewed today: I reviewed all medications, new labs and imaging results over the last 24 hours. I personally reviewed no images or EKG's today.    Physical Exam   Vital Signs: Temp: 97.7  F (36.5  C) Temp src: Axillary BP: 105/64 Pulse: 67   Resp: 16 SpO2: 94 % O2 Device: None (Room air)    Weight: 289 lbs 9.6 oz     Constitutional: awake, alert, cooperative, no apparent distress  Neuropsychiatric: General: normal, calm and normal eye contact    Data   Recent Labs   Lab 10/07/22  0813 10/06/22  1510 10/06/22  0814 10/04/22  1128 10/03/22  1143   PLT  --  254  --   --  291   POTASSIUM 3.4 3.8 3.3*   < > 3.4   CR 0.68  --   --   --   --     < > = values in this interval not displayed.     Recent Results (from the past 24 hour(s))   US Pelvis Complete without Transvaginal    Narrative    EXAM: US PELVIC TRANSABDOMINAL  LOCATION: [facility]  DATE/TIME: 10/8/2022 8:00 AM    INDICATION: Vaginal bleeding  COMPARISON: None.  TECHNIQUE: Transabdominal scans were performed.    FINDINGS:    UTERUS: 9.4 x 4.6 x 3.3 cm. No fibroids.    ENDOMETRIUM: 14 mm. Abnormal echogenic thickening of the endometrium    RIGHT OVARY: Not visualized, likely obscured by bowel gas.    LEFT OVARY: Not visualized, likely obscured by bowel gas.    No significant free fluid.      Impression    IMPRESSION:  1.  Abnormal endometrial thickening to 14 mm. Consider  gynecological consultation for endometrial sampling.           Medications     - MEDICATION INSTRUCTIONS -         ARIPiprazole  10 mg Oral QPM     aspirin  325 mg Oral Daily     atorvastatin  40 mg Oral QPM     enoxaparin ANTICOAGULANT  40 mg Subcutaneous Q12H     furosemide  20 mg Oral BID     [Held by provider] lisinopril  5 mg Oral Daily     miconazole   Topical BID     multivitamins w/minerals  15 mL Oral Daily     OLANZapine  2.5 mg Oral At Bedtime     polyethylene glycol  17 g Oral Daily     potassium chloride  20 mEq Oral Daily     senna-docusate  1-2 tablet Oral BID     sertraline  50 mg Oral Daily     sodium chloride (PF)  3 mL Intracatheter Q8H     spironolactone  12.5 mg Oral Daily

## 2022-10-08 NOTE — PROGRESS NOTES
MD Notification    Notified Person: MD    Notified Person Name: Salvador Diaz     Notification Date/Time: 10/08/22 @ 0342    Notification Interaction: [employer] web page     Purpose of Notification: pt found to be bleeding around her vaginal area- unsure if from urethra or vaginal opening. Bleeding appears to have stopped while doing joesph care. Pt unaware of bleeding, denies pain.     Orders Received:    Comments:

## 2022-10-08 NOTE — PROVIDER NOTIFICATION
MD Notification    Notified Person: MD    Notified Person Name: Steffen Purcell    Notification Date/Time: 1:19  10/8    Notification Interaction: vocera message    Purpose of Notification: patient had small BM, but had more joesph bleeding. Looks to be coming from vagina and not urethra. Would you like to order an additional tests?    Orders Received: will have ob/gyn see her    Comments:

## 2022-10-08 NOTE — PLAN OF CARE
"Goal Outcome Evaluation:    Plan of Care Reviewed With: patient     Overall Patient Progress: no change       Summary: CVA R MCA  DATE & TIME: 10/08 8778-6675  Cognitive Concerns/ Orientation : A&O to self and place. Occasionally calling out \"help me.\" Forgetful. Needs frequent Reminders/ education to use call-light.   BEHAVIOR & AGGRESSION TOOL COLOR: Green     ABNL VS: VSS on RA. Q12 vitals  MOBILITY: Up with assist of 2/lift. On pulsate mattress. Turn Q2hr. Pt. Refusing repositioning at times. BLE elevated.  PAIN MANAGMENT: Denies except discomfort with turning  DIET: Regular. Poor appetite; Oral fluids encouraged.   BOWEL/BLADDER: Incontinent- no brief in bed, small BM on BSC  ABNL LAB/BG: Mg 1.9, K 3.8, recheck in AM   DRAIN/DEVICES: LUE PIV SL, WDL.   TELEMETRY RHYTHM: NA  SKIN: Mepilex Dressing to right flank/back CDI. Blanchable redness to coccyx/buttocks.   TESTS/PROCEDURES: Pelvic ultrasound completed 10/8   D/C DATE: Pending TCU placement.   Discharge Barriers: Placement  OTHER IMPORTANT INFO: SW following. Unable to swallow larger pills, able to swallow once pills broken in half. Pt noted to be bleeding from her vaginal area overnight. Pelvis US performed- found abnormal echogenic thickening of endometrium. Ob/gyn consulted. Patient has no urge to urinate.      "

## 2022-10-08 NOTE — PLAN OF CARE
"Goal Outcome Evaluation:    Plan of Care Reviewed With: patient     Overall Patient Progress: no change     Summary: CVA R MCA  DATE & TIME: 10/7-10/08 [id number]  Cognitive Concerns/ Orientation : A&O to self and place. Occasionally calling out \"help me.\" Forgetful. Needs frequent Reminders/ education to use call-light.   BEHAVIOR & AGGRESSION TOOL COLOR: Green     ABNL VS: VSS on RA. Q12 vitals  MOBILITY: Up with assist of 2/lift. On pulsate mattress. Turn Q2hr. Pt. Refusing repositioning at times. BLE elevated.  PAIN MANAGMENT: Denies except discomfort with turning  DIET: Regular. Poor appetite; Oral fluids encouraged. BOWEL/BLADDER: Incontinent- no brief in bed   ABNL LAB/BG: Mg 2.0, K 3.4, recheck in AM   DRAIN/DEVICES: LUE PIV SL, WDL.   TELEMETRY RHYTHM: NA  SKIN: Mepilex Dressing to right flank/back CDI. Blanchable redness to coccyx/buttocks.   TESTS/PROCEDURES: Pelvic ultrasound   D/C DATE: Pending TCU placement.   Discharge Barriers: Placement  OTHER IMPORTANT INFO: SW following. Unable to swallow larger pills, able to swallow once pills broken in half. Pt noted to be bleeding from her vaginal area, blood noted on incontinence pad, but no urine, joesph care performed unsure if source was coming from urethra or vaginal canal, bleeding appears to have stopped. Pt unaware of bleeding, denies pain. VS stable. Pt encouraged to void to better determine source of bleeding. Pt unable to void at this time, bladder scanned for 96ml. Md paged- pelvic US ordered.     Rechecked patient, bleeding has continued. Majority of blood appears to be pooling in/around vaginal opening. Patient has not voided yet, pt has no urge- bladder scanned for 108ml. Fluids given.   "

## 2022-10-09 NOTE — CONSULTS
Gyn Consult    76 yo female prolonged hospital admission for acute ischemic stroke developed recent vaginal bleeding, thickened endometrium 14 mm noted on pelvic ultrasound.   Consult requested to determine next steps.   Hgb stable at 14.7.    I spoke w/ RN, vaginal bleeding is light, and pt requires full lift assistance.         Patient Active Problem List   Diagnosis     Hyperlipidemia LDL goal <160     Abnormal glucose     Obesity     Leg weakness, bilateral     CRP elevated     Vitamin D deficiency     Health Care Home     HTN, goal below 140/90     Constipation     Leg weakness     Myalgia and myositis     Morbid obesity (H)     Hip osteoarthritis     Status post THR (total hip replacement)     Long term current use of anticoagulant therapy     Generalized muscle weakness     Cellulitis of right hip     Unable to ambulate     Cellulitis     CVA (cerebral vascular accident) (H)           Plan:  In light of minimal bleeding + stable hgb, recommend outpatient evaluation for endometrial sampling in the near future.  Currently has more pressing acute inpatient  medical needs, and would need gyn bed + significant assistance with positioning in order to accomplish gyn exam.  Please give pt our contact info so outpatient follow up can be arranged.  She could also f/u with a different gyn provider of her choosing.      Argelia Kennedy MD  [facility]  10/9/22  [phone]

## 2022-10-09 NOTE — PROGRESS NOTES
[facility]    Medicine Progress Note - Hospitalist Service    Date of Admission:  7/13/2022  Date of Service: 10/09/2022    Assessment & Plan         Cristy Bailey is a 77 year old female with a PMHx significant for morbid obesity, hypertension, and hyperlipidemia, who presented to [facility] 7/13/2022 with complete left-sided paralysis, left-sided facial droop, and difficulty speaking. CTA head remarkable for right MCA occlusion. She was given tenecteplase and subsequently transferred to [facility] 7/13/2022 for thrombectomy.   Hospital stay complicated by flank wound which required surgical debridement and placement of wound vac and findings LTC placement.     Acute R MCA ischemic stroke with edema and right to left midline shift  S/p tenecteplase and subsequent R MCA mechanical thrombectomy 7/13/22  * Presented to [facility] 7/13 with complete paralysis, left-sided facial droop, and aphasia. Code stroke initiated. Head CT 7/13 showed signs of an evolving R MCA infarct. CTA head 7/13 showed a right carotid terminus occlusion, right middle cerebral artery M1  segment occlusion with poor opacification of the more distal right MCA branches/poor collateral flow. CTA neck 7/13 negative for acute occlusions. Pt given tenecteplase 7/13 at 13:11. Noted to be agitated and was subsequently intubated given need for procedure.   * Subsequently transferred to [facility] 7/13/2022 where she underwent above procedure.   7/14: Extubated. Head CT 7/14 showed evolution of acute infarct involving the R MCA distribution with increased swelling, cortical effacement, and new mild right-to-left midline shift; questionable hypoattenuation involving the bilateral occipital lobes which could be artifactual.   * Additional stroke workup pursued this stay -- echo 7/14 showed EF 50%, grade 1 diastolic dysfunction   * Started ASA and atorvastatin per stroke team.   * Repeat head CT 7/15 showed evolving R  MCA stroke with areas of edema, overall stable.  Plan:  -- Continue full dose aspirin  -- Goal SBP <140/90 and met   -- Monitored on telemetry for first several wks of hospital stay without abnl rhythm so can likely defer prior recommendations for 30d cardiac event monitor at discharge  -- Will need to follow up with [facility] in 6-8 wks  -- Waiting on placement    Vaginal Bleeding  Assessment: vaginal bleeding last couple days. Pelvis US shows abnormal endometrial thickening to 14 mm. Hgb stable.   Plan:  - OB/GYN consulted -> recommend outpatient evaluation for endometrial sampling in the near future.     Severe malnutrition in context of acute illness and chronic disease  * Nutritionist following. Appetite poor this stay, needing encouragement to take po.   *there is some discussion about needing feeding support, but patient does not want to have a feeding tube.  * 70 lb weight loss in the last few months (likely lower in the context of diuresis for CHF and inherent error in measuring)  Plan  - encourage oral intake  - patient does not want a feeding tube at this point and her health care agent dulce maria agrees with this, consistent with previous wishes     Anxiety   Cognitive impairment, confusion as above.   * As stay has progressed, patient has consistently been oriented x2 (to self, location), unable to state the year. Also noted to have intermittent situational confusion. States family members have been visiting when they have not.   * Ongoing issues with anxiety this stay -- had been on regimen of Seroquel 12.5mg at dinner and 25mg HS with 12.5mg po q6h prn available. Mirtazapine 7.5mg on 8/15 evening given decreased appetite  * Psych consulted for assistance with ongoing mgmt -- seen on 8/16 and meds adjusted. Seroquel HS changed to Abilify 5mg HS. Recommended Zyptexa 5mg q6h prn for breakthrough insomnia/agitation while going off Seroquel. Consider uptitration of Abilify per psych recs. Advised uptitration of  sertraline (25mg x3d then increase to 50mg daily beginning 8/20) and Remeron stopped. Also advised to liberalize Ativan to 0.5mg q6h prn.    * Seen by psych on 8/24, Abilify increased from 5mg to 10mg.  * PRN ativan stopped on 08/27/22 as leading to increased confusion. Hydroxyzine stopped  as well.   -- cont current regimen of meds including Abilify 5mg every evening, Zyprexa 2.5mg HS and sertraline 50mg daily  -- cont prns per psych including Zyprexa 5mg q6h prn     Goals of care, failure to thrive  Discussed with patient and significant other/Health care agent Leo. She remains confused. He reiterated her wishes for DNR/DNI and no feeding tubes   We discussed concern about overall prognosis, with her eating 0-25% of her meals and nutrition recommending alternative forms of nutritions  She has lost 70 lbs since admit (some of that is probably related to diuresis and some related to variable weights in hospital, but still significant)  Overall, placement in a facility has been difficult, and there is concern about her continued deteriorating under these circumstances  Plan  - now DNR/DNI  - see discussion regarding weight loss above  Palliative care meeting with Leo 9/28.     Urinary retention: Resolved  * Retaining urine on 8/14. UA WNL, not worrisome for infection  * Requiring straight cath x1 on 8/20 PM  * Good UOP, no longer requiring straight cath. No evidence of obstruction on PVR.      Dysphagia due to CVA: Resolved  * Seen by SLP and noted with dysphagia. Initially required some intermittent fluid boluses.   * Was eventually able to advance to regular diet w/thin liquids     Dyslipidemia  * FLP this stay showed tot cholest 163, HDL 47, LDL 91, .   * Started on atorvastatin 40 mg daily     Volume overload dt diastolic CHF exacerbation: Improved  Essential hypertension  Hypokalemia, recurrent on lasix, and often refusing potassium   * Not on/needing meds PTA.   * As above, echo this stay showed EF  50% with grade I diastolic dysfunction; RV not well visualized but appeared mild-moderately dilated with global systolic function probably mildly reduced.   * BPs were initially soft this stay and required IVFs. BPs improved.   * Developed pedal edema required IV Lasix. Ultimately transition to oral Lasix.  * Started on lisinopril this stay  * BPs improved during stay.   * Lasix decreased from 40mg in AM and 20mg in PM to 20mg BID on 9/3 and added spironolactone 25mg daily     -- reduced lisinopril dose to 5mg daily with parameters on 9/14, will hold lisinopril completely for now as she needs diuretics  -- cont Lasix 20mg po BID  -- reduced spironolactone to 12.5mg daily with parameters on 9/14  -- cont KCl 20mEq po daily  -- f/u bp and adjust closely as indicated     Hypophosphatemia: Resolved  Hypokalemia     Refusing replacement    Encourage bananas     Prolonged QTc   * EKG on 7/13 showed QTc 494.   * Repeat EKG on 7/30 (while on Levaquin) showed QTc table at 475. Has since completed course of abx as below.  * Had been monitoring on telemetry this stay. No concerning findings so tele was ultimately dc'd 8/19     Chronic bilateral LE edema with chronic venous stasis dermatitis and chronic skin changes  Hx of LLE cellulitis  * Was hospitalized in 11/2021 for sepsis dt to pneumonia and LLE cellulitis.   * During this stay, BLE noted to be patty and edematous, no unilateral leg swelling appreciated; LLE had patchy areas of erythema, no fluctuance, no painful areas with palpation; legs warm to touch. Lymphedema consulted.   -- cont LE elevation, lymphedema wraps     Lower back/R flank wound/abscess, suspect dt pressure type injury, s/p surgical debridement on 7/30/22 and placement of wound vac on 8/2/22,  OA  Hx of bilateral hip replacements   Hx of chronic neck and back pain  * Pt with significant back pain early on in hospital stay. Was requiring IV hydromorphone.  Dose had to be decreased dt somnolence.  * MRI  L-spine in 2018 showed multilevel degenerative changes with mild central stenosis. Patient has chronic back pain, reports having it over the last 2 years.   * During this stay, patient was noted with 5-6cm by 2-3cm wound with swelling/fluid/blistering in a fold of her lower back, did not appear infected; this seemed to be the source of her pain. Padded dressing placed and WOC RN ordered. Pain initially improved.   * Was placed on course of Augmentin on 7/24.   * On 7/26, was re-evaluated by WOC RN. Wound appeared more erythematous and purulence expressed. Worsening with shear stress from lift. Procal <0.05, WBC WNL. Abx changed to IV clindamycin.  * Wound cultures obtained. On 7/28, wound grew proteus and staph aureus (MRSA neg). US neg for abscess.   * Lost IV access on 7/28 so abx were changed to oral clindamycin and oral levaquin. IV access re-established but was continued on oral abx.  * General surgery consulted, ultimately underwent excisional debridement of R flank abscess in OR on 7/30. Intraop cultures showed polymicrobial growth with proteus mirabilis x2 strains (both pan-sensitive), corynebacterium striatum and enterococcus faecalis.  * Wound vac placed per general surgery on 8/2 >> subsequently removed during stay.  * ID consulted on 8/2 and abx narrowed to Augmentin alone, completed an additional 5 days of treatment on 8/7.   -- wound per WOC RN, follows weekly  -- prns available for pain  -- cont regular repositioning     Pressure Injury, left Medial thigh/groin  Pressure Injury Stage: Deep Tissue Pressure Injury (DTPI), hospital acquired, now HAPI stage 2 pressure injury device related purwick external catheter tubing (not the purwick device itself)  - Wound care following     Pressure injury bilateral buttocks  * Noted by nursing on 09/02/22. WOC RN following as above.      Suspected meningioma left parietal region, incidentally seen on admission CT on 7/13  * Head CT 7/13 showed a calcified  extra-axial mass overlying the left parietal region measuring 1.4 cm, potentially representing a meningioma.  * Will need serial monitoring OP after discharge.      Anemia, suspect dilutional component: Resolved  * Hgb normal on admit. Hgb 11.5 on 7/16. No overt clinical signs of major bleeding.  * Hgb now stable at 12-13      ntertriginous dermatitis  * Chronic and stable, cont clotrimazole powder     Constipation  * Continue sched bowel regimen     Morbid obesity  * BMI 59 on admission. Recommend aggressive dietary and lifestyle modifications as condition improves     Suspected sleep apnea  * O2 drop to mid 80s overnight 8/14. Briefly placed on 4LPM, which she then removed and then slept okay with sats in 90s thereafter.  * Recommend sleep study as outpatient     Malnutrition:    - Level of malnutrition: Severe   Severe malnutrition. In Context of:  Acute illness or injury  Chronic illness or disease  Malnutrition: (8/31)   % Weight Loss:  > 5% in 1 month (severe malnutrition) - significant loss over this admit  % Intake:  </= 50% for >/= 5 days (severe malnutrition) - consistent this admit x49 days  Subcutaneous Fat Loss:  Orbital region moderate depletion - visual, per 8/16 note  Muscle Loss:  Temporal region moderate depletion - visual, per 8/16 note  Fluid Retention:  Mild generalized edema  - dietitian following, recommend alternative feeding source? Not clear she would tolerate a tube feed with her confusion, will monitor, perhaps consider some carb counting    10/7- no new issues, continue present care     Diet: Advance Diet as Tolerated  Regular Diet Adult Thin Liquids (level 0) (Upright position, alert, and assist as needed)  Room Service    DVT Prophylaxis: Enoxaparin (Lovenox) SQ  [place] Catheter: Not present  Central Lines: None  Cardiac Monitoring: None  Code Status: No CPR- Do NOT Intubate      Disposition Plan      Expected Discharge Date: 10/11/2022,  9:00 AM  Discharge Delays: Placement -  LTC  *Medically Ready for Discharge    Discharge Comments: Multiple referrerals outl  Did receive her 2nd covid shot  LTC placement        The patient's care was discussed with the Bedside Nurse, Patient and Patient's Family     Steffen Purcell MD  Hospitalist Service  [facility]  Securely message with the Vocera Web Console (learn more here)  Text page via [employer] Paging/Directory         Clinically Significant Risk Factors Present on Admission                      ______________________________________________________________________    Interval History      Stable overnight, no new complaints   Awaiting placement    Data reviewed today: I reviewed all medications, new labs and imaging results over the last 24 hours. I personally reviewed no images or EKG's today.    Physical Exam   Vital Signs: Temp: (!) 95  F (35  C) Temp src: Axillary BP: 96/75 Pulse: 68   Resp: 16 SpO2: 91 % O2 Device: None (Room air)    Weight: 289 lbs 4.8 oz     Constitutional: awake, alert, cooperative, no apparent distress  Neuropsychiatric: General: normal, calm and normal eye contact    Data   Recent Labs   Lab 10/09/22  0817 10/08/22  1831 10/08/22  1112 10/07/22  0813 10/06/22  1510   WBC 6.1  --  6.5  --   --    HGB 14.7  --  14.6  --   --    MCV 88  --  88  --   --      --  248  --  254   NA  --   --  137  --   --    POTASSIUM 3.6 3.8 3.4 3.4 3.8   CHLORIDE  --   --  101  --   --    CO2  --   --  27  --   --    BUN  --   --  13  --   --    CR  --   --  0.65 0.68  --    ANIONGAP  --   --  9  --   --    ASHIA  --   --  8.9  --   --    GLC  --   --  90  --   --      No results found for this or any previous visit (from the past 24 hour(s)).  Medications     - MEDICATION INSTRUCTIONS -         ARIPiprazole  10 mg Oral QPM     aspirin  325 mg Oral Daily     atorvastatin  40 mg Oral QPM     enoxaparin ANTICOAGULANT  40 mg Subcutaneous Q12H     furosemide  20 mg Oral BID     [Held by provider] lisinopril  5 mg Oral  Daily     miconazole   Topical BID     multivitamins w/minerals  15 mL Oral Daily     OLANZapine  2.5 mg Oral At Bedtime     polyethylene glycol  17 g Oral Daily     potassium chloride  20 mEq Oral Daily     senna-docusate  1-2 tablet Oral BID     sertraline  50 mg Oral Daily     sodium chloride (PF)  3 mL Intracatheter Q8H     spironolactone  12.5 mg Oral Daily

## 2022-10-09 NOTE — PROGRESS NOTES
"Cognitive Concerns/ Orientation : A&O to self and place. Occasionally calling out \"help me.\" Forgetful. Needs frequent Reminders/ education to use call-light.   BEHAVIOR & AGGRESSION TOOL COLOR: Green     ABNL VS: VSS on RA. Q12 vitals  MOBILITY: Up with assist of 2/lift. On pulsate mattress. Turn Q2hr. Pt. Refusing repositioning at times. BLE elevated on pillows  PAIN MANAGMENT: Denies   DIET: Regular. Poor appetite; encouraging oral fluids encouraged.   BOWEL/BLADDER: Incont of bladder- no brief in bed  ABNL LAB/BG: None new on this shift  DRAIN/DEVICES: LUE PIV SL, WDL.   TELEMETRY RHYTHM: NA  SKIN: Mepilex Dressing to right flank/back CDI. Blanchable redness to coccyx/buttocks, perineum  TESTS/PROCEDURES: Pelvic ultrasound completed 10/8 and resulted. - found abnormal echogenic thickening of endometrium. Ob/gyn consulted.  D/C DATE: Pending TCU placement.   Discharge Barriers: Placement  OTHER IMPORTANT INFO: SW following.   Continues to have moderate amount of bleeding from vaginal area, denies pain from vagina.  "

## 2022-10-09 NOTE — PLAN OF CARE
"Goal Outcome Evaluation:      Plan of Care Reviewed With: patient    Overall Patient Progress: no changeOverall Patient Progress: no change       Summary: KEILY VELASCO.   DATE & TIME:10/9/22 [id number]  Cognitive Concerns/ Orientation : A&O to self and place. Occasionally calling out \"help me.\" Forgetful. Needs frequent Reminders/ education to use call-light.   BEHAVIOR & AGGRESSION TOOL COLOR: Green - restless and agitated this shift  ABNL VS: VSS on RA. Q12 vitals  MOBILITY: Up with assist of 2/lift. On pulsate mattress. Turn Q2hr. Pt. Refusing repositioning at times. BLE elevated on pillows  PAIN MANAGMENT: patient c/o 10/10 pain in feet. Asked for stockings to removed, ice packs. Oxy given x1.   DIET: Regular. Poor appetite; encouraging oral fluids encouraged.   BOWEL/BLADDER: Incontinent- no brief in bed  ABNL LAB/BG: None new on this shift, recheck of Mag, K+ 10/10 AM  DRAIN/DEVICES: LUE PIV SL, WDL.   TELEMETRY RHYTHM: NA  SKIN: Mepilex Dressing to right flank/back CDI. Blanchable redness to coccyx/buttocks, perineum  TESTS/PROCEDURES: Pelvic ultrasound completed 10/8 and resulted. - found abnormal echogenic thickening of endometrium. Ob/gyn consulted- recommended outpatient treatment/ follow up  D/C DATE: Pending TCU placement.   Discharge Barriers: Placement  OTHER IMPORTANT INFO: SW following.   Continues to have scant amount of bleeding from her vaginal area, denies pain from vagina.    "

## 2022-10-10 NOTE — PROGRESS NOTES
"Summary: KEILY VELASCO.   DATE & TIME:10/9/22-10/10/22 [id number]  Cognitive Concerns/ Orientation : A&O to self and place. Occasionally calling out \"help me.\" Forgetful. Needs frequent Reminders/ education to use call-light.   BEHAVIOR & AGGRESSION TOOL COLOR: Green -   ABNL VS: VSS on RA.   MOBILITY: Up with assist of 2/lift. On pulsate mattress. Turn Q2hr. Pt. Refusing repositioning at times. BLE elevated on pillows  PAIN MANAGMENT: denies pain  DIET: Regular. Encouraging oral fluids   BOWEL/BLADDER: Incontinent- no brief in bed  ABNL LAB/BG: None new on this shift  DRAIN/DEVICES: LUE PIV SL, WDL.   TELEMETRY RHYTHM: NA  SKIN: Mepilex Dressing to right flank/back CDI. Blanchable redness to coccyx/buttocks, perineum  TESTS/PROCEDURES: Pelvic ultrasound completed 10/8 and resulted. - found abnormal echogenic thickening of endometrium. Ob/gyn consulted- recommended outpatient treatment/ follow up  D/C DATE: Pending TCU placement.   Discharge Barriers: Placement  OTHER IMPORTANT INFO: SW following.   Continues to have scant amount of bleeding from her vaginal area.    "

## 2022-10-10 NOTE — CARE PLAN
Alert and oriented to place and self. Calls out frequently, scared to let workers leave room. Up with sarasteady/lift. C/o pain with movement.  Incontinent of bladder, vaginal bleeding x1 noted. Wound care done today. Turn and repo q2. Tolerating diet. Plan to continue to monitor tonight discharge to TCU when able.

## 2022-10-10 NOTE — PROGRESS NOTES
[facility]    Medicine Progress Note - Hospitalist Service    Date of Admission:  7/13/2022  Date of Service: 10/10/2022    Assessment & Plan         Cristy Bailey is a 77 year old female with a PMHx significant for morbid obesity, hypertension, and hyperlipidemia, who presented to [facility] 7/13/2022 with complete left-sided paralysis, left-sided facial droop, and difficulty speaking. CTA head remarkable for right MCA occlusion. She was given tenecteplase and subsequently transferred to [facility] 7/13/2022 for thrombectomy.   Hospital stay complicated by flank wound which required surgical debridement and placement of wound vac and findings LTC placement.     Acute R MCA ischemic stroke with edema and right to left midline shift  S/p tenecteplase and subsequent R MCA mechanical thrombectomy 7/13/22  * Presented to [facility] 7/13 with complete paralysis, left-sided facial droop, and aphasia. Code stroke initiated. Head CT 7/13 showed signs of an evolving R MCA infarct. CTA head 7/13 showed a right carotid terminus occlusion, right middle cerebral artery M1  segment occlusion with poor opacification of the more distal right MCA branches/poor collateral flow. CTA neck 7/13 negative for acute occlusions. Pt given tenecteplase 7/13 at 13:11. Noted to be agitated and was subsequently intubated given need for procedure.   * Subsequently transferred to [facility] 7/13/2022 where she underwent above procedure.   7/14: Extubated. Head CT 7/14 showed evolution of acute infarct involving the R MCA distribution with increased swelling, cortical effacement, and new mild right-to-left midline shift; questionable hypoattenuation involving the bilateral occipital lobes which could be artifactual.   * Additional stroke workup pursued this stay -- echo 7/14 showed EF 50%, grade 1 diastolic dysfunction   * Started ASA and atorvastatin per stroke team.   * Repeat head CT 7/15 showed evolving R  MCA stroke with areas of edema, overall stable.  Plan:  -- Continue full dose aspirin  -- Goal SBP <140/90 and met   -- Monitored on telemetry for first several wks of hospital stay without abnl rhythm so can likely defer prior recommendations for 30d cardiac event monitor at discharge  -- Will need to follow up with [facility] in 6-8 wks  -- Waiting on placement    Vaginal Bleeding  Assessment: vaginal bleeding last couple days. Pelvis US shows abnormal endometrial thickening to 14 mm. Hgb stable.   Plan:  - OB/GYN consulted -> recommend outpatient evaluation for endometrial sampling in the near future.     Severe malnutrition in context of acute illness and chronic disease  * Nutritionist following. Appetite poor this stay, needing encouragement to take po.   *there is some discussion about needing feeding support, but patient does not want to have a feeding tube.  * 70 lb weight loss in the last few months (likely lower in the context of diuresis for CHF and inherent error in measuring)  Plan  - encourage oral intake  - patient does not want a feeding tube at this point and her health care agent dulce maria agrees with this, consistent with previous wishes     Anxiety   Cognitive impairment, confusion as above.   * As stay has progressed, patient has consistently been oriented x2 (to self, location), unable to state the year. Also noted to have intermittent situational confusion. States family members have been visiting when they have not.   * Ongoing issues with anxiety this stay -- had been on regimen of Seroquel 12.5mg at dinner and 25mg HS with 12.5mg po q6h prn available. Mirtazapine 7.5mg on 8/15 evening given decreased appetite  * Psych consulted for assistance with ongoing mgmt -- seen on 8/16 and meds adjusted. Seroquel HS changed to Abilify 5mg HS. Recommended Zyptexa 5mg q6h prn for breakthrough insomnia/agitation while going off Seroquel. Consider uptitration of Abilify per psych recs. Advised uptitration of  sertraline (25mg x3d then increase to 50mg daily beginning 8/20) and Remeron stopped. Also advised to liberalize Ativan to 0.5mg q6h prn.    * Seen by psych on 8/24, Abilify increased from 5mg to 10mg.  * PRN ativan stopped on 08/27/22 as leading to increased confusion. Hydroxyzine stopped  as well.   -- cont current regimen of meds including Abilify 5mg every evening, Zyprexa 2.5mg HS and sertraline 50mg daily  -- cont prns per psych including Zyprexa 5mg q6h prn     Goals of care, failure to thrive  Discussed with patient and significant other/Health care agent Leo. She remains confused. He reiterated her wishes for DNR/DNI and no feeding tubes   We discussed concern about overall prognosis, with her eating 0-25% of her meals and nutrition recommending alternative forms of nutritions  She has lost 70 lbs since admit (some of that is probably related to diuresis and some related to variable weights in hospital, but still significant)  Overall, placement in a facility has been difficult, and there is concern about her continued deteriorating under these circumstances  Plan  - now DNR/DNI  - see discussion regarding weight loss above  Palliative care meeting with Leo 9/28.     Urinary retention: Resolved  * Retaining urine on 8/14. UA WNL, not worrisome for infection  * Requiring straight cath x1 on 8/20 PM  * Good UOP, no longer requiring straight cath. No evidence of obstruction on PVR.      Dysphagia due to CVA: Resolved  * Seen by SLP and noted with dysphagia. Initially required some intermittent fluid boluses.   * Was eventually able to advance to regular diet w/thin liquids     Dyslipidemia  * FLP this stay showed tot cholest 163, HDL 47, LDL 91, .   * Started on atorvastatin 40 mg daily     Volume overload dt diastolic CHF exacerbation: Improved  Essential hypertension  Hypokalemia, recurrent on lasix, and often refusing potassium   * Not on/needing meds PTA.   * As above, echo this stay showed EF  50% with grade I diastolic dysfunction; RV not well visualized but appeared mild-moderately dilated with global systolic function probably mildly reduced.   * BPs were initially soft this stay and required IVFs. BPs improved.   * Developed pedal edema required IV Lasix. Ultimately transition to oral Lasix.  * Started on lisinopril this stay  * BPs improved during stay.   * Lasix decreased from 40mg in AM and 20mg in PM to 20mg BID on 9/3 and added spironolactone 25mg daily     -- reduced lisinopril dose to 5mg daily with parameters on 9/14, will hold lisinopril completely for now as she needs diuretics  -- cont Lasix 20mg po BID  -- reduced spironolactone to 12.5mg daily with parameters on 9/14  -- cont KCl 20mEq po daily  -- f/u bp and adjust closely as indicated     Hypophosphatemia: Resolved  Hypokalemia     Refusing replacement    Encourage bananas     Prolonged QTc   * EKG on 7/13 showed QTc 494.   * Repeat EKG on 7/30 (while on Levaquin) showed QTc table at 475. Has since completed course of abx as below.  * Had been monitoring on telemetry this stay. No concerning findings so tele was ultimately dc'd 8/19     Chronic bilateral LE edema with chronic venous stasis dermatitis and chronic skin changes  Hx of LLE cellulitis  * Was hospitalized in 11/2021 for sepsis dt to pneumonia and LLE cellulitis.   * During this stay, BLE noted to be patty and edematous, no unilateral leg swelling appreciated; LLE had patchy areas of erythema, no fluctuance, no painful areas with palpation; legs warm to touch. Lymphedema consulted.   -- cont LE elevation, lymphedema wraps     Lower back/R flank wound/abscess, suspect dt pressure type injury, s/p surgical debridement on 7/30/22 and placement of wound vac on 8/2/22,  OA  Hx of bilateral hip replacements   Hx of chronic neck and back pain  * Pt with significant back pain early on in hospital stay. Was requiring IV hydromorphone.  Dose had to be decreased dt somnolence.  * MRI  L-spine in 2018 showed multilevel degenerative changes with mild central stenosis. Patient has chronic back pain, reports having it over the last 2 years.   * During this stay, patient was noted with 5-6cm by 2-3cm wound with swelling/fluid/blistering in a fold of her lower back, did not appear infected; this seemed to be the source of her pain. Padded dressing placed and WOC RN ordered. Pain initially improved.   * Was placed on course of Augmentin on 7/24.   * On 7/26, was re-evaluated by WOC RN. Wound appeared more erythematous and purulence expressed. Worsening with shear stress from lift. Procal <0.05, WBC WNL. Abx changed to IV clindamycin.  * Wound cultures obtained. On 7/28, wound grew proteus and staph aureus (MRSA neg). US neg for abscess.   * Lost IV access on 7/28 so abx were changed to oral clindamycin and oral levaquin. IV access re-established but was continued on oral abx.  * General surgery consulted, ultimately underwent excisional debridement of R flank abscess in OR on 7/30. Intraop cultures showed polymicrobial growth with proteus mirabilis x2 strains (both pan-sensitive), corynebacterium striatum and enterococcus faecalis.  * Wound vac placed per general surgery on 8/2 >> subsequently removed during stay.  * ID consulted on 8/2 and abx narrowed to Augmentin alone, completed an additional 5 days of treatment on 8/7.   -- wound per WOC RN, follows weekly  -- prns available for pain  -- cont regular repositioning     Pressure Injury, left Medial thigh/groin  Pressure Injury Stage: Deep Tissue Pressure Injury (DTPI), hospital acquired, now HAPI stage 2 pressure injury device related purwick external catheter tubing (not the purwick device itself)  - Wound care following     Pressure injury bilateral buttocks  * Noted by nursing on 09/02/22. WOC RN following as above.      Suspected meningioma left parietal region, incidentally seen on admission CT on 7/13  * Head CT 7/13 showed a calcified  extra-axial mass overlying the left parietal region measuring 1.4 cm, potentially representing a meningioma.  * Will need serial monitoring OP after discharge.      Anemia, suspect dilutional component: Resolved  * Hgb normal on admit. Hgb 11.5 on 7/16. No overt clinical signs of major bleeding.  * Hgb now stable at 12-13      ntertriginous dermatitis  * Chronic and stable, cont clotrimazole powder     Constipation  * Continue sched bowel regimen     Morbid obesity  * BMI 59 on admission. Recommend aggressive dietary and lifestyle modifications as condition improves     Suspected sleep apnea  * O2 drop to mid 80s overnight 8/14. Briefly placed on 4LPM, which she then removed and then slept okay with sats in 90s thereafter.  * Recommend sleep study as outpatient     Malnutrition:    - Level of malnutrition: Severe   Severe malnutrition. In Context of:  Acute illness or injury  Chronic illness or disease  Malnutrition: (8/31)   % Weight Loss:  > 5% in 1 month (severe malnutrition) - significant loss over this admit  % Intake:  </= 50% for >/= 5 days (severe malnutrition) - consistent this admit x49 days  Subcutaneous Fat Loss:  Orbital region moderate depletion - visual, per 8/16 note  Muscle Loss:  Temporal region moderate depletion - visual, per 8/16 note  Fluid Retention:  Mild generalized edema  - dietitian following, recommend alternative feeding source? Not clear she would tolerate a tube feed with her confusion, will monitor, perhaps consider some carb counting    10/7- no new issues, continue present care     Diet: Advance Diet as Tolerated  Regular Diet Adult Thin Liquids (level 0) (Upright position, alert, and assist as needed)  Room Service    DVT Prophylaxis: Enoxaparin (Lovenox) SQ  [place] Catheter: Not present  Central Lines: None  Cardiac Monitoring: None  Code Status: No CPR- Do NOT Intubate      Disposition Plan      Expected Discharge Date: 10/12/2022,  9:00 AM  Discharge Delays: Placement -  LTC  *Medically Ready for Discharge    Discharge Comments: Multiple referrerals outl  Did receive her 2nd covid shot  LTC placement        The patient's care was discussed with the Bedside Nurse, Patient and Patient's Family     Steffen Purcell MD  Hospitalist Service  [facility]  Securely message with the Vocera Web Console (learn more here)  Text page via [employer] Paging/Directory         Clinically Significant Risk Factors Present on Admission                      ______________________________________________________________________    Interval History      Stable overnight, no new complaints   No CP/SOB  Awaiting placement    Data reviewed today: I reviewed all medications, new labs and imaging results over the last 24 hours. I personally reviewed no images or EKG's today.    Physical Exam   Vital Signs: Temp: 98.1  F (36.7  C) Temp src: Oral BP: 104/72 Pulse: 78   Resp: 18 SpO2: 90 % O2 Device: None (Room air)    Weight: 288 lbs 4.8 oz     Constitutional: awake, alert, cooperative, no apparent distress  Neuropsychiatric: General: normal, calm and normal eye contact    Data   Recent Labs   Lab 10/10/22  0759 10/09/22  2106 10/09/22  0817 10/08/22  1831 10/08/22  1112 10/07/22  0813 10/06/22  1510   WBC  --   --  6.1  --  6.5  --   --    HGB  --   --  14.7  --  14.6  --   --    MCV  --   --  88  --  88  --   --    PLT  --   --  272  --  248  --  254   NA  --   --   --   --  137  --   --    POTASSIUM 3.8  --  3.6 3.8 3.4 3.4 3.8   CHLORIDE  --   --   --   --  101  --   --    CO2  --   --   --   --  27  --   --    BUN  --   --   --   --  13  --   --    CR  --   --   --   --  0.65 0.68  --    ANIONGAP  --   --   --   --  9  --   --    ASHIA  --   --   --   --  8.9  --   --    GLC  --  90  --   --  90  --   --      No results found for this or any previous visit (from the past 24 hour(s)).  Medications     - MEDICATION INSTRUCTIONS -         ARIPiprazole  10 mg Oral QPM     aspirin  325 mg Oral  Daily     atorvastatin  40 mg Oral QPM     enoxaparin ANTICOAGULANT  40 mg Subcutaneous Q12H     furosemide  20 mg Oral BID     [Held by provider] lisinopril  5 mg Oral Daily     miconazole   Topical BID     multivitamins w/minerals  15 mL Oral Daily     OLANZapine  2.5 mg Oral At Bedtime     polyethylene glycol  17 g Oral Daily     potassium chloride  20 mEq Oral Daily     senna-docusate  1-2 tablet Oral BID     sertraline  50 mg Oral Daily     sodium chloride (PF)  3 mL Intracatheter Q8H     spironolactone  12.5 mg Oral Daily

## 2022-10-10 NOTE — PLAN OF CARE
"Goal Outcome Evaluation:    Pt is progressing. Will continue to monitor.      Summary: CVA R KRISTA.   DATE & TIME:10/10/22, Night shift  Cognitive Concerns/ Orientation : A&O to self and place. Occasionally calling out \"help me.\" Forgetful. Needs frequent Reminders/ education to use call-light.   BEHAVIOR & AGGRESSION TOOL COLOR: Green -   ABNL VS: VSS on RA.   MOBILITY: Up with assist of 2/lift. On pulsate mattress. Turn Q2hr. Pt. Refusing repositioning at times. BLE elevated on pillows  PAIN MANAGMENT: denies pain  DIET: Regular. Encouraging oral fluids   BOWEL/BLADDER: Incontinent- no brief in bed  ABNL LAB/BG: None new on this shift  DRAIN/DEVICES: LUE PIV SL, WDL.   TELEMETRY RHYTHM: NA  SKIN: Mepilex Dressing to right flank/back was changed this morning. Blanchable redness to coccyx/buttocks, perineum  TESTS/PROCEDURES: Pelvic ultrasound completed 10/8 and resulted. - found abnormal echogenic thickening of endometrium. Ob/gyn consulted- recommended outpatient treatment/ follow up  D/C DATE: Pending TCU placement.   Discharge Barriers: Placement  OTHER IMPORTANT INFO: SW following.   Continues to have scant amount of bleeding from her vaginal area, denies pain from vagina.         "

## 2022-10-11 NOTE — PLAN OF CARE
"Goal Outcome Evaluation:       Summary: KEILY VELASCO.   DATE & TIME:10/11/22 ([id number])  Cognitive Concerns/ Orientation : A&O to self and place, disoriented to time/situation, Occasionally calling out \"help me.\" Forgetful. Needs frequent Reminders/ education to use call-light.   BEHAVIOR & AGGRESSION TOOL COLOR: Green  ABNL VS: VSS on RA.   MOBILITY: Up with assist of 2/lift. On pulsate mattress. Turn Q2hr. Pt. Refusing repositioning at times. BLE elevated on pillows  PAIN MANAGMENT: denies pain  DIET: Regular. Encouraging oral fluids   BOWEL/BLADDER: Incontinent bowel & bladder this shift  ABNL LAB/BG:K 3.1 replaced per protocol, recheck level 3.2, attempted to replace again orally with pill and powder but patient refusing.  Attempted Iv dose x1-pt unable to tolerate, 1 bag IV given.  Paged MD and ok to recheck level in am and not continue to replace.     DRAIN/DEVICES: LUE PIV SL, WDL.   TELEMETRY RHYTHM: NA  SKIN: Mepilex Dressing to right flank/back was changed this morning. Blanchable redness to coccyx/buttocks, perineum  TESTS/PROCEDURES: Pelvic ultrasound completed 10/8 and resulted. - found abnormal echogenic thickening of endometrium. Ob/gyn consulted- recommended outpatient treatment/ follow up-pt has some bleeding with urination   D/C DATE: Pending TCU placement.   Discharge Barriers: Placement  OTHER IMPORTANT INFO: SW following.   Continues to have scant amount of bleeding from her vaginal area, denies pain from vagina.                 "

## 2022-10-11 NOTE — PROGRESS NOTES
[facility]    Medicine Progress Note - Hospitalist Service    Date of Admission:  7/13/2022  Date of Service: 10/11/2022    Assessment & Plan         Cristy Bailey is a 77 year old female with a PMHx significant for morbid obesity, hypertension, and hyperlipidemia, who presented to [facility] 7/13/2022 with complete left-sided paralysis, left-sided facial droop, and difficulty speaking. CTA head remarkable for right MCA occlusion. She was given tenecteplase and subsequently transferred to [facility] 7/13/2022 for thrombectomy.   Hospital stay complicated by flank wound which required surgical debridement and placement of wound vac and findings LTC placement.     Acute R MCA ischemic stroke with edema and right to left midline shift  S/p tenecteplase and subsequent R MCA mechanical thrombectomy 7/13/22  * Presented to [facility] 7/13 with complete paralysis, left-sided facial droop, and aphasia. Code stroke initiated. Head CT 7/13 showed signs of an evolving R MCA infarct. CTA head 7/13 showed a right carotid terminus occlusion, right middle cerebral artery M1  segment occlusion with poor opacification of the more distal right MCA branches/poor collateral flow. CTA neck 7/13 negative for acute occlusions. Pt given tenecteplase 7/13 at 13:11. Noted to be agitated and was subsequently intubated given need for procedure.   * Subsequently transferred to [facility] 7/13/2022 where she underwent above procedure.   7/14: Extubated. Head CT 7/14 showed evolution of acute infarct involving the R MCA distribution with increased swelling, cortical effacement, and new mild right-to-left midline shift; questionable hypoattenuation involving the bilateral occipital lobes which could be artifactual.   * Additional stroke workup pursued this stay -- echo 7/14 showed EF 50%, grade 1 diastolic dysfunction   * Started ASA and atorvastatin per stroke team.   * Repeat head CT 7/15 showed evolving R  MCA stroke with areas of edema, overall stable.  Plan:  -- Continue full dose aspirin  -- Goal SBP <140/90 and met   -- Monitored on telemetry for first several wks of hospital stay without abnl rhythm so can likely defer prior recommendations for 30d cardiac event monitor at discharge  -- Will need to follow up with [facility] in 6-8 wks  -- Waiting on placement    Vaginal Bleeding  Assessment: vaginal bleeding last couple days. Pelvis US shows abnormal endometrial thickening to 14 mm. Hgb stable.   Plan:  - OB/GYN consulted -> recommend outpatient evaluation for endometrial sampling in the near future.     Severe malnutrition in context of acute illness and chronic disease  * Nutritionist following. Appetite poor this stay, needing encouragement to take po.   *there is some discussion about needing feeding support, but patient does not want to have a feeding tube.  * 70 lb weight loss in the last few months (likely lower in the context of diuresis for CHF and inherent error in measuring)  Plan  - encourage oral intake  - patient does not want a feeding tube at this point and her health care agent dulce maria agrees with this, consistent with previous wishes     Anxiety   Cognitive impairment, confusion as above.   * As stay has progressed, patient has consistently been oriented x2 (to self, location), unable to state the year. Also noted to have intermittent situational confusion. States family members have been visiting when they have not.   * Ongoing issues with anxiety this stay -- had been on regimen of Seroquel 12.5mg at dinner and 25mg HS with 12.5mg po q6h prn available. Mirtazapine 7.5mg on 8/15 evening given decreased appetite  * Psych consulted for assistance with ongoing mgmt -- seen on 8/16 and meds adjusted. Seroquel HS changed to Abilify 5mg HS. Recommended Zyptexa 5mg q6h prn for breakthrough insomnia/agitation while going off Seroquel. Consider uptitration of Abilify per psych recs. Advised uptitration of  sertraline (25mg x3d then increase to 50mg daily beginning 8/20) and Remeron stopped. Also advised to liberalize Ativan to 0.5mg q6h prn.    * Seen by psych on 8/24, Abilify increased from 5mg to 10mg.  * PRN ativan stopped on 08/27/22 as leading to increased confusion. Hydroxyzine stopped  as well.   -- cont current regimen of meds including Abilify 5mg every evening, Zyprexa 2.5mg HS and sertraline 50mg daily  -- cont prns per psych including Zyprexa 5mg q6h prn     Goals of care, failure to thrive  Discussed with patient and significant other/Health care agent Leo. She remains confused. He reiterated her wishes for DNR/DNI and no feeding tubes   We discussed concern about overall prognosis, with her eating 0-25% of her meals and nutrition recommending alternative forms of nutritions  She has lost 70 lbs since admit (some of that is probably related to diuresis and some related to variable weights in hospital, but still significant)  Overall, placement in a facility has been difficult, and there is concern about her continued deteriorating under these circumstances  Plan  - now DNR/DNI  - see discussion regarding weight loss above  Palliative care meeting with Leo 9/28.     Urinary retention: Resolved  * Retaining urine on 8/14. UA WNL, not worrisome for infection  * Requiring straight cath x1 on 8/20 PM  * Good UOP, no longer requiring straight cath. No evidence of obstruction on PVR.      Dysphagia due to CVA: Resolved  * Seen by SLP and noted with dysphagia. Initially required some intermittent fluid boluses.   * Was eventually able to advance to regular diet w/thin liquids     Dyslipidemia  * FLP this stay showed tot cholest 163, HDL 47, LDL 91, .   * Started on atorvastatin 40 mg daily     Volume overload dt diastolic CHF exacerbation: Improved  Essential hypertension  Hypokalemia, recurrent on lasix, and often refusing potassium   * Not on/needing meds PTA.   * As above, echo this stay showed EF  50% with grade I diastolic dysfunction; RV not well visualized but appeared mild-moderately dilated with global systolic function probably mildly reduced.   * BPs were initially soft this stay and required IVFs. BPs improved.   * Developed pedal edema required IV Lasix. Ultimately transition to oral Lasix.  * Started on lisinopril this stay  * BPs improved during stay.   * Lasix decreased from 40mg in AM and 20mg in PM to 20mg BID on 9/3 and added spironolactone 25mg daily  Plan:  -- reduced lisinopril dose to 5mg daily with parameters on 9/14, will hold lisinopril completely for now as she needs diuretics  -- cont Lasix 20mg po BID  -- reduced spironolactone to 12.5mg daily with parameters on 9/14  -- cont KCl 20mEq po daily  -- f/u bp and adjust closely as indicated     Hypophosphatemia: Resolved  Hypokalemia     Refusing replacement    Encourage bananas     Prolonged QTc   * EKG on 7/13 showed QTc 494.   * Repeat EKG on 7/30 (while on Levaquin) showed QTc table at 475. Has since completed course of abx as below.  * Had been monitoring on telemetry this stay. No concerning findings so tele was ultimately dc'd 8/19     Chronic bilateral LE edema with chronic venous stasis dermatitis and chronic skin changes  Hx of LLE cellulitis  * Was hospitalized in 11/2021 for sepsis dt to pneumonia and LLE cellulitis.   * During this stay, BLE noted to be patty and edematous, no unilateral leg swelling appreciated; LLE had patchy areas of erythema, no fluctuance, no painful areas with palpation; legs warm to touch. Lymphedema consulted.   -- cont LE elevation, lymphedema wraps     Lower back/R flank wound/abscess, suspect dt pressure type injury, s/p surgical debridement on 7/30/22 and placement of wound vac on 8/2/22,  OA  Hx of bilateral hip replacements   Hx of chronic neck and back pain  * Pt with significant back pain early on in hospital stay. Was requiring IV hydromorphone.  Dose had to be decreased dt somnolence.  * MRI  L-spine in 2018 showed multilevel degenerative changes with mild central stenosis. Patient has chronic back pain, reports having it over the last 2 years.   * During this stay, patient was noted with 5-6cm by 2-3cm wound with swelling/fluid/blistering in a fold of her lower back, did not appear infected; this seemed to be the source of her pain. Padded dressing placed and WOC RN ordered. Pain initially improved.   * Was placed on course of Augmentin on 7/24.   * On 7/26, was re-evaluated by WOC RN. Wound appeared more erythematous and purulence expressed. Worsening with shear stress from lift. Procal <0.05, WBC WNL. Abx changed to IV clindamycin.  * Wound cultures obtained. On 7/28, wound grew proteus and staph aureus (MRSA neg). US neg for abscess.   * Lost IV access on 7/28 so abx were changed to oral clindamycin and oral levaquin. IV access re-established but was continued on oral abx.  * General surgery consulted, ultimately underwent excisional debridement of R flank abscess in OR on 7/30. Intraop cultures showed polymicrobial growth with proteus mirabilis x2 strains (both pan-sensitive), corynebacterium striatum and enterococcus faecalis.  * Wound vac placed per general surgery on 8/2 >> subsequently removed during stay.  * ID consulted on 8/2 and abx narrowed to Augmentin alone, completed an additional 5 days of treatment on 8/7.   -- wound per WOC RN, follows weekly  -- prns available for pain  -- cont regular repositioning     Pressure Injury, left Medial thigh/groin  Pressure Injury Stage: Deep Tissue Pressure Injury (DTPI), hospital acquired, now HAPI stage 2 pressure injury device related purwick external catheter tubing (not the purwick device itself)  - Wound care following     Pressure injury bilateral buttocks  * Noted by nursing on 09/02/22. WOC RN following as above.      Suspected meningioma left parietal region, incidentally seen on admission CT on 7/13  * Head CT 7/13 showed a calcified  extra-axial mass overlying the left parietal region measuring 1.4 cm, potentially representing a meningioma.  * Will need serial monitoring OP after discharge.      Anemia, suspect dilutional component: Resolved  * Hgb normal on admit. Hgb 11.5 on 7/16. No overt clinical signs of major bleeding.  * Hgb now stable at 12-13      ntertriginous dermatitis  * Chronic and stable, cont clotrimazole powder     Constipation  * Continue sched bowel regimen     Morbid obesity  * BMI 59 on admission. Recommend aggressive dietary and lifestyle modifications as condition improves     Suspected sleep apnea  * O2 drop to mid 80s overnight 8/14. Briefly placed on 4LPM, which she then removed and then slept okay with sats in 90s thereafter.  * Recommend sleep study as outpatient     Malnutrition:    - Level of malnutrition: Severe   Severe malnutrition. In Context of:  Acute illness or injury  Chronic illness or disease  Malnutrition: (8/31)   % Weight Loss:  > 5% in 1 month (severe malnutrition) - significant loss over this admit  % Intake:  </= 50% for >/= 5 days (severe malnutrition) - consistent this admit x49 days  Subcutaneous Fat Loss:  Orbital region moderate depletion - visual, per 8/16 note  Muscle Loss:  Temporal region moderate depletion - visual, per 8/16 note  Fluid Retention:  Mild generalized edema  - dietitian following, recommend alternative feeding source? Not clear she would tolerate a tube feed with her confusion, will monitor, perhaps consider some carb counting       Diet: Advance Diet as Tolerated  Regular Diet Adult Thin Liquids (level 0) (Upright position, alert, and assist as needed)  Room Service    DVT Prophylaxis: Enoxaparin (Lovenox) SQ  [place] Catheter: Not present  Central Lines: None  Cardiac Monitoring: None  Code Status: No CPR- Do NOT Intubate      Disposition Plan      Expected Discharge Date: 10/13/2022    Discharge Delays: Placement - LTC  *Medically Ready for Discharge    Discharge Comments:  Multiple referrerals outl  Did receive her 2nd covid shot  LTC placement        The patient's care was discussed with the Bedside Nurse and Patient     Steffen Purcell MD  Hospitalist Service  [facility]  Securely message with the Vocera Web Console (learn more here)  Text page via Turned On Digital Paging/Directory         Clinically Significant Risk Factors Present on Admission                      ______________________________________________________________________    Interval History      Stable overnight, no new complaints   No CP/SOB  Awaiting placement -> Patient is a very difficult placement.    Data reviewed today: I reviewed all medications, new labs and imaging results over the last 24 hours. I personally reviewed no images or EKG's today.    Physical Exam   Vital Signs: Temp: 97.9  F (36.6  C) Temp src: Oral BP: 99/54 Pulse: 69   Resp: 18 SpO2: 99 % O2 Device: None (Room air)    Weight: 288 lbs 0 oz     Constitutional: awake, alert, cooperative, no apparent distress  Neuropsychiatric: General: normal, calm and normal eye contact    Data   Recent Labs   Lab 10/11/22  1404 10/11/22  0740 10/10/22  0759 10/09/22  2106 10/09/22  0817 10/08/22  1831 10/08/22  1112 10/07/22  0813 10/06/22  1510   WBC  --   --   --   --  6.1  --  6.5  --   --    HGB  --   --   --   --  14.7  --  14.6  --   --    MCV  --   --   --   --  88  --  88  --   --    PLT  --   --   --   --  272  --  248  --  254   NA  --   --   --   --   --   --  137  --   --    POTASSIUM 3.2* 3.1* 3.8  --  3.6   < > 3.4 3.4 3.8   CHLORIDE  --   --   --   --   --   --  101  --   --    CO2  --   --   --   --   --   --  27  --   --    BUN  --   --   --   --   --   --  13  --   --    CR  --   --   --   --   --   --  0.65 0.68  --    ANIONGAP  --   --   --   --   --   --  9  --   --    ASHIA  --   --   --   --   --   --  8.9  --   --    GLC  --   --   --  90  --   --  90  --   --     < > = values in this interval not displayed.     No results  found for this or any previous visit (from the past 24 hour(s)).  Medications     - MEDICATION INSTRUCTIONS -         ARIPiprazole  10 mg Oral QPM     aspirin  325 mg Oral Daily     atorvastatin  40 mg Oral QPM     enoxaparin ANTICOAGULANT  40 mg Subcutaneous Q12H     furosemide  20 mg Oral BID     [Held by provider] lisinopril  5 mg Oral Daily     miconazole   Topical BID     multivitamin w/minerals  1 tablet Oral Daily     OLANZapine  2.5 mg Oral At Bedtime     polyethylene glycol  17 g Oral Daily     potassium chloride  40 mEq Oral or Feeding Tube Once     potassium chloride  10 mEq Intravenous Q1H     potassium chloride  20 mEq Oral Daily     potassium chloride  40 mEq Oral Once     senna-docusate  1-2 tablet Oral BID     sertraline  50 mg Oral Daily     sodium chloride (PF)  3 mL Intracatheter Q8H     spironolactone  12.5 mg Oral Daily

## 2022-10-11 NOTE — PROGRESS NOTES
"CLINICAL NUTRITION SERVICES - REASSESSMENT NOTE    RECOMMENDATIONS FOR MD/PROVIDER TO ORDER:   - Max nutrition interventions reached. Pt continues to decline supplements, has declined nutrition support as well.   co   Recommendations Ordered by Registered Dietitian (RD):   - Changed liquid multivitamin back to Thera vit M. Pt has been refusing liquid version.    - Continue consistent encouragement for PO.     Malnutrition: (9/21)  % Weight Loss:  > 5% in 1 month (severe malnutrition) - significant loss over this admit  % Intake:  </= 50% for >/= 5 days (severe malnutrition) - consistent this admit x70 days  Subcutaneous Fat Loss:  Orbital region moderate depletion - visual, per 8/16 note  Muscle Loss:  Temporal region moderate depletion - visual, per 8/16 note  Fluid Retention:  Mild generalized edema     Malnutrition Diagnosis: Severe malnutrition  In Context of:  Acute illness or injury  Chronic illness or disease     EVALUATION OF PROGRESS TOWARD GOALS   Diet: Regular diet  Room service w/ assist   Intake/Tolerance:   - No change. Continues to consume 0-25% of her trays at best. This morning she was sipping on Hot cocoa, which she said tasted good. She did not want to try a chocolate ensure. She also had PB cups on her tray table, package was open but not eaten. Pt reported \"those were from yesterday\" and did not indicate interest in eating them today.     - Labs:   Potassium 3.1 (L)  - Weight  130.6 kg (288 lb) today. Wt is consistent the past week, overall trending downward over admission (from the 170s kg --> 120/130s kg)   - Stooling: BM x1 most days   BM x1 yesterday  BM x1 on 10/8  BM x1 on 10/7    Meds reviewed -   Lasix BID   Liquid multivitamin - Pt refuses consistently.   Miralax - held this morning; Senokot BID - given this morning   Potassium chloride     ASSESSED NUTRITION NEEDS:  Dosing Weight: 89.7 kg (adjusted)  Estimated Energy Needs: 6133-8764+ kcals " (15-20+ Kcal/Kg)  Justification: maintenance r/t BMI, Wounds  Estimated Protein Needs: 108-135 grams protein (1.2-1.5 g pro/Kg)  Justification: wound healing, obesity guidelines, preservation of lean body mass and increased needs r/t age, Wounds     NEW FINDINGS:   - Discharge pending placement  - WOCN note 10/4 -   Right lower back waist crease: improving  Left medial thigh/groin; healed     Previous Goals:   Patient will improve intake to 50% meals BID   Evaluation: Not met    Previous Nutrition Diagnosis:   Inadequate oral intake related to poor appetite, disinterest in eating, and increased protein needs for wound healing as evidenced by 0-50% of meals consistently over 83 day admission  Evaluation: No change    CURRENT NUTRITION DIAGNOSIS  Inadequate oral intake related to poor appetite, disinterest in eating, and increased protein needs for wound healing as evidenced by 0-50% of meals consistently over 83 day admission    INTERVENTIONS  Recommendations / Nutrition Prescription  Continue regular diet  - Changed liquid multivitamin back to Thera vit M. Pt has been refusing liquid version.  - Continue consistent encouragement for PO.      Implementation  Multivitamin/Mineral - updated    Goals  Patient will improve intake to 50% meals BID     MONITORING AND EVALUATION:  Progress towards goals will be monitored and evaluated per protocol and Practice Guidelines    Nicole Mitchell RD, LD  [facility], [facility], [facility]  Pager: [phone]  Weekend Pager: [phone]

## 2022-10-11 NOTE — PLAN OF CARE
Goal Outcome Evaluation:      Plan of Care Reviewed With: patient          Outcome Evaluation: no changes. Still tolerating 0-25% of meals. Sipping on hot cocoa w/ whole milk this morning, tolerating. Pt again declined supplements. Changed multivitamin to pill form as pt is consistently refusing liquid form.

## 2022-10-11 NOTE — PLAN OF CARE
Alert to self and place. VSS. LS diminished. +bs, +flatus, -bm. Voiding adequately. Incontinent of bowel and bladder. Turn and repo. Denies pain. Up with lift.

## 2022-10-11 NOTE — PLAN OF CARE
"Goal Outcome Evaluation:         Summary: KEILY VELASCO.   DATE & TIME:10/10/22-10/11/22 [id number]  Cognitive Concerns/ Orientation : A&O to self and place. Occasionally calling out \"help me.\" Forgetful. Needs frequent Reminders/ education to use call-light.   BEHAVIOR & AGGRESSION TOOL COLOR: Green  ABNL VS: VSS on RA.   MOBILITY: Up with assist of 2/lift. On pulsate mattress. Turn Q2hr. Pt. Refusing repositioning at times. BLE elevated on pillows  PAIN MANAGMENT: denies pain  DIET: Regular. Encouraging oral fluids   BOWEL/BLADDER: Incontinent- no brief in bed  ABNL LAB/BG: None new on this shift  DRAIN/DEVICES: LUE PIV SL, WDL.   TELEMETRY RHYTHM: NA  SKIN: Mepilex Dressing to right flank/back was changed this morning. Blanchable redness to coccyx/buttocks, perineum  TESTS/PROCEDURES: Pelvic ultrasound completed 10/8 and resulted. - found abnormal echogenic thickening of endometrium. Ob/gyn consulted- recommended outpatient treatment/ follow up  D/C DATE: Pending TCU placement.   Discharge Barriers: Placement  OTHER IMPORTANT INFO: SW following.   Continues to have scant amount of bleeding from her vaginal area, denies pain from vagina.               "

## 2022-10-11 NOTE — CONSULTS
Care Management is already involved for discharge planning.  S.O. was requesting short term rehab for patient however she has been declined by TCU's due to her level of care and lack of progress in therapy.   We are pursing a long term care placement for patient where she can receive daily therapy. Referrals are being faxed today to SNF's which have current long term care vacancies.   Writer will need to update S.O. and explain once a SNF has accepted patient he will be updated.

## 2022-10-11 NOTE — CONSULTS
Care Management Follow Up    Length of Stay (days): 90    Expected Discharge Date: Pending  placement  Concerns to be Addressed: NA      Patient plan of care discussed at interdisciplinary rounds: Yes    Anticipated Discharge Disposition: Facility placement     Anticipated Discharge Services:  NH  Anticipated Discharge DME: NA     Patient/family educated on Medicare website which has current facility and service quality ratings: yes   Education Provided on the Discharge Plan:  ongoing  Patient/Family in Agreement with the Plan:  Ongoing discussion    Referrals Placed by CM/SW:  Multiple NH referrals 26 in all placed as of 10/4/22  Private pay costs discussed: Not applicable    Additional Information:  Please see Care Management consult from 7/19/22 and last SW note for more information.  Patient is a very difficult placement.      Daisha Shetty, RN   Inpatient Care Management  [phone]

## 2022-10-12 NOTE — PLAN OF CARE
Summary: KEILY VELASCO.   DATE & TIME:10/11/22 1900- 10/12/22 4846  Cognitive Concerns/ Orientation : A&O to self and place, disoriented to time/situation,   BEHAVIOR & AGGRESSION TOOL COLOR: Green  ABNL VS: VSS on RA.   MOBILITY: Up with assist of 2/lift. On pulsate mattress. Turn Q2hr. BLE elevated on pillows  PAIN MANAGMENT: denies pain  DIET: Regular. Need encouragement to eat meals.  Encouraging oral fluids   BOWEL/BLADDER: Incontinent  ABNL LAB/BG: K 3.1 replaced per protocol, recheck this morning.  DRAIN/DEVICES: LUE PIV SL   TELEMETRY RHYTHM: NA  SKIN: Mepilex Dressing to right flank/back CDI. Blanchable redness to coccyx/buttocks, perineum. BLE edema 2+, compression stockings on.   TESTS/PROCEDURES: None  D/C DATE: Pending LTC placement.  Discharge Barriers: Placement  OTHER IMPORTANT INFO: SW following.   Continues to have scant amount of bleeding from her vaginal area, OB/GYN aware.

## 2022-10-12 NOTE — PLAN OF CARE
Goal Outcome Evaluation:  Summary: KEILY VELASCO.   DATE & TIME:10/12/22  [id number]  Cognitive Concerns/ Orientation : A&O to self and place, disoriented to time/situation,   BEHAVIOR & AGGRESSION TOOL COLOR: Green  ABNL VS: VSS on RA.   MOBILITY: Up with assist of 2 lata steady. On pulsate mattress. Turn Q2hr. BLE elevated on pillows  PAIN MANAGMENT: denies pain  DIET: Regular. Need encouragement to eat meals.  Encouraging oral fluids   BOWEL/BLADDER: Up with lata steady to get on BSC, can be Incontinent, Had 3 BM today  ABNL LAB/BG: K 3.3 refusing protocol, able to get scheduled potassium in today.  DRAIN/DEVICES: LUE PIV SL   TELEMETRY RHYTHM: NA  SKIN: Mepilex Dressing to right flank/back CDI. Blanchable redness to coccyx/buttocks, perineum. BLE edema 2+, compression stockings on.   TESTS/PROCEDURES: None  D/C DATE: Pending LTC placement.  Discharge Barriers: Placement  OTHER IMPORTANT INFO: SW following.   Continues to have scant amount of bleeding from her vaginal area, OB/GYN aware.

## 2022-10-12 NOTE — PROGRESS NOTES
Care Management Follow Up    Length of Stay (days): 91    Expected Discharge Date: 10/15/2022     Concerns to be Addressed:       Patient plan of care discussed at interdisciplinary rounds: Yes    Anticipated Discharge Disposition: SNF     Anticipated Discharge Services:    Anticipated Discharge DME:      Patient/family educated on Medicare website which has current facility and service quality ratings:    Education Provided on the Discharge Plan:    Patient/Family in Agreement with the Plan:      Referrals Placed by CM/SW:    Private pay costs discussed:     Additional Information:  Referrals sent on 10/11 to the following SNF's which identified having potential vacancy   [facility]  [facility]  [facility]  [place] [place] declined due to acuity  [facility]  [place] [facility]

## 2022-10-12 NOTE — PROGRESS NOTES
[facility]    Medicine Progress Note - Hospitalist Service    Date of Admission:  7/13/2022    Assessment & Plan        Cristy Bailey is a 77 year old female with a PMHx significant for morbid obesity, hypertension, and hyperlipidemia, who presented to [facility] 7/13/2022 with complete left-sided paralysis, left-sided facial droop, and difficulty speaking. CTA head remarkable for right MCA occlusion. She was given tenecteplase and subsequently transferred to [facility] 7/13/2022 for thrombectomy.   Hospital stay complicated by flank wound which required surgical debridement and placement of wound vac and findings LTC placement.     Acute R MCA ischemic stroke with edema and right to left midline shift  S/p tenecteplase and subsequent R MCA mechanical thrombectomy 7/13/22  * Presented to [facility] 7/13 with complete paralysis, left-sided facial droop, and aphasia. Code stroke initiated. Head CT 7/13 showed signs of an evolving R MCA infarct. CTA head 7/13 showed a right carotid terminus occlusion, right middle cerebral artery M1  segment occlusion with poor opacification of the more distal right MCA branches/poor collateral flow. CTA neck 7/13 negative for acute occlusions. Pt given tenecteplase 7/13 at 13:11. Noted to be agitated and was subsequently intubated given need for procedure.   * Subsequently transferred to [facility] 7/13/2022 where she underwent above procedure.   7/14: Extubated. Head CT 7/14 showed evolution of acute infarct involving the R MCA distribution with increased swelling, cortical effacement, and new mild right-to-left midline shift; questionable hypoattenuation involving the bilateral occipital lobes which could be artifactual.   * Additional stroke workup pursued this stay -- echo 7/14 showed EF 50%, grade 1 diastolic dysfunction   * Started ASA and atorvastatin per stroke team.   * Repeat head CT 7/15 showed evolving R MCA stroke with areas of edema,  overall stable.  Plan:  -- Continue full dose aspirin  -- Goal SBP <140/90 and met   -- Monitored on telemetry for first several wks of hospital stay without abnl rhythm so can likely defer prior recommendations for 30d cardiac event monitor at discharge  -- Will need to follow up with [facility] in 6-8 wks  -- Waiting on placement     Vaginal Bleeding  Assessment: vaginal bleeding last couple days. Pelvis US shows abnormal endometrial thickening to 14 mm. Hgb stable.   Plan:  - OB/GYN consulted -> recommend outpatient evaluation for endometrial sampling in the near future.     Severe malnutrition in context of acute illness and chronic disease  * Nutritionist following. Appetite poor this stay, needing encouragement to take po.   *there is some discussion about needing feeding support, but patient does not want to have a feeding tube.  * 70 lb weight loss in the last few months (likely lower in the context of diuresis for CHF and inherent error in measuring)  Plan  - encourage oral intake  - patient does not want a feeding tube at this point and her health care agent dulce maria agrees with this, consistent with previous wishes     Anxiety   Cognitive impairment, confusion as above.   * As stay has progressed, patient has consistently been oriented x2 (to self, location), unable to state the year. Also noted to have intermittent situational confusion. States family members have been visiting when they have not.   * Ongoing issues with anxiety this stay -- had been on regimen of Seroquel 12.5mg at dinner and 25mg HS with 12.5mg po q6h prn available. Mirtazapine 7.5mg on 8/15 evening given decreased appetite  * Psych consulted for assistance with ongoing mgmt -- seen on 8/16 and meds adjusted. Seroquel HS changed to Abilify 5mg HS. Recommended Zyptexa 5mg q6h prn for breakthrough insomnia/agitation while going off Seroquel. Consider uptitration of Abilify per psych recs. Advised uptitration of sertraline (25mg x3d then increase  to 50mg daily beginning 8/20) and Remeron stopped. Also advised to liberalize Ativan to 0.5mg q6h prn.    * Seen by psych on 8/24, Abilify increased from 5mg to 10mg.  * PRN ativan stopped on 08/27/22 as leading to increased confusion. Hydroxyzine stopped  as well.   -- cont current regimen of meds including Abilify 5mg every evening, Zyprexa 2.5mg HS and sertraline 50mg daily  -- cont prns per psych including Zyprexa 5mg q6h prn     Goals of care, failure to thrive  Discussed with patient and significant other/Health care agent Leo. She remains confused. He reiterated her wishes for DNR/DNI and no feeding tubes   We discussed concern about overall prognosis, with her eating 0-25% of her meals and nutrition recommending alternative forms of nutritions  She has lost 70 lbs since admit (some of that is probably related to diuresis and some related to variable weights in hospital, but still significant)  Overall, placement in a facility has been difficult, and there is concern about her continued deteriorating under these circumstances  Plan  - now DNR/DNI  - see discussion regarding weight loss above  Palliative care meeting with Leo 9/28.     Urinary retention: Resolved  * Retaining urine on 8/14. UA WNL, not worrisome for infection  * Requiring straight cath x1 on 8/20 PM  * Good UOP, no longer requiring straight cath. No evidence of obstruction on PVR.      Dysphagia due to CVA: Resolved  * Seen by SLP and noted with dysphagia. Initially required some intermittent fluid boluses.   * Was eventually able to advance to regular diet w/thin liquids     Dyslipidemia  * FLP this stay showed tot cholest 163, HDL 47, LDL 91, .   * Started on atorvastatin 40 mg daily     Volume overload dt diastolic CHF exacerbation: Improved  Essential hypertension  Hypokalemia, recurrent on lasix, and often refusing potassium   * Not on/needing meds PTA.   * As above, echo this stay showed EF 50% with grade I diastolic  dysfunction; RV not well visualized but appeared mild-moderately dilated with global systolic function probably mildly reduced.   * BPs were initially soft this stay and required IVFs. BPs improved.   * Developed pedal edema required IV Lasix. Ultimately transition to oral Lasix.  * Started on lisinopril this stay  * BPs improved during stay.   * Lasix decreased from 40mg in AM and 20mg in PM to 20mg BID on 9/3 and added spironolactone 25mg daily  Plan:  -- reduced lisinopril dose to 5mg daily with parameters on 9/14, will hold lisinopril completely for now as she needs diuretics  -- cont Lasix 20mg po BID  -- reduced spironolactone to 12.5mg daily with parameters on 9/14  -- cont KCl 20mEq po daily  -- f/u bp and adjust closely as indicated     Hypophosphatemia: Resolved  Hypokalemia     Refusing replacement    Encourage bananas     Prolonged QTc   * EKG on 7/13 showed QTc 494.   * Repeat EKG on 7/30 (while on Levaquin) showed QTc table at 475. Has since completed course of abx as below.  * Had been monitoring on telemetry this stay. No concerning findings so tele was ultimately dc'd 8/19     Chronic bilateral LE edema with chronic venous stasis dermatitis and chronic skin changes  Hx of LLE cellulitis  * Was hospitalized in 11/2021 for sepsis dt to pneumonia and LLE cellulitis.   * During this stay, BLE noted to be patty and edematous, no unilateral leg swelling appreciated; LLE had patchy areas of erythema, no fluctuance, no painful areas with palpation; legs warm to touch. Lymphedema consulted.   -- cont LE elevation, lymphedema wraps     Lower back/R flank wound/abscess, suspect dt pressure type injury, s/p surgical debridement on 7/30/22 and placement of wound vac on 8/2/22,  OA  Hx of bilateral hip replacements   Hx of chronic neck and back pain  * Pt with significant back pain early on in hospital stay. Was requiring IV hydromorphone.  Dose had to be decreased dt somnolence.  * MRI L-spine in 2018 showed  multilevel degenerative changes with mild central stenosis. Patient has chronic back pain, reports having it over the last 2 years.   * During this stay, patient was noted with 5-6cm by 2-3cm wound with swelling/fluid/blistering in a fold of her lower back, did not appear infected; this seemed to be the source of her pain. Padded dressing placed and WOC RN ordered. Pain initially improved.   * Was placed on course of Augmentin on 7/24.   * On 7/26, was re-evaluated by WOC RN. Wound appeared more erythematous and purulence expressed. Worsening with shear stress from lift. Procal <0.05, WBC WNL. Abx changed to IV clindamycin.  * Wound cultures obtained. On 7/28, wound grew proteus and staph aureus (MRSA neg). US neg for abscess.   * Lost IV access on 7/28 so abx were changed to oral clindamycin and oral levaquin. IV access re-established but was continued on oral abx.  * General surgery consulted, ultimately underwent excisional debridement of R flank abscess in OR on 7/30. Intraop cultures showed polymicrobial growth with proteus mirabilis x2 strains (both pan-sensitive), corynebacterium striatum and enterococcus faecalis.  * Wound vac placed per general surgery on 8/2 >> subsequently removed during stay.  * ID consulted on 8/2 and abx narrowed to Augmentin alone, completed an additional 5 days of treatment on 8/7.   -- wound per WOC RN, follows weekly  -- prns available for pain  -- cont regular repositioning     Pressure Injury, left Medial thigh/groin  Pressure Injury Stage: Deep Tissue Pressure Injury (DTPI), hospital acquired, now HAPI stage 2 pressure injury device related purwick external catheter tubing (not the purwick device itself)  - Wound care following     Pressure injury bilateral buttocks  * Noted by nursing on 09/02/22. WOC RN following as above.      Suspected meningioma left parietal region, incidentally seen on admission CT on 7/13  * Head CT 7/13 showed a calcified extra-axial mass overlying the  left parietal region measuring 1.4 cm, potentially representing a meningioma.  * Will need serial monitoring OP after discharge.      Anemia, suspect dilutional component: Resolved  * Hgb normal on admit. Hgb 11.5 on 7/16. No overt clinical signs of major bleeding.  * Hgb now stable at 12-13      ntertriginous dermatitis  * Chronic and stable, cont clotrimazole powder     Constipation  * Continue sched bowel regimen     Morbid obesity  * BMI 59 on admission. Recommend aggressive dietary and lifestyle modifications as condition improves     Suspected sleep apnea  * O2 drop to mid 80s overnight 8/14. Briefly placed on 4LPM, which she then removed and then slept okay with sats in 90s thereafter.  * Recommend sleep study as outpatient     Malnutrition:    - Level of malnutrition: Severe   Severe malnutrition. In Context of:  Acute illness or injury  Chronic illness or disease  Malnutrition: (8/31)   % Weight Loss:  > 5% in 1 month (severe malnutrition) - significant loss over this admit  % Intake:  </= 50% for >/= 5 days (severe malnutrition) - consistent this admit x49 days  Subcutaneous Fat Loss:  Orbital region moderate depletion - visual, per 8/16 note  Muscle Loss:  Temporal region moderate depletion - visual, per 8/16 note  Fluid Retention:  Mild generalized edema  - dietitian following, recommend alternative feeding source? Not clear she would tolerate a tube feed with her confusion, will monitor, perhaps consider some carb counting    10/12- stable, no new issues. Replace K as able        Diet: Advance Diet as Tolerated  Regular Diet Adult Thin Liquids (level 0) (Upright position, alert, and assist as needed)  Room Service    DVT Prophylaxis: Pneumatic Compression Devices  [place] Catheter: Not present  Central Lines: None  Cardiac Monitoring: None  Code Status: No CPR- Do NOT Intubate      Disposition Plan     Expected Discharge Date: 10/13/2022    Discharge Delays: Placement - LTC  *Medically Ready for Discharge     Discharge Comments: Multiple referrerals outl  Did receive her 2nd covid shot  LTC placement        The patient's care was discussed with the Patient/nurse    Donal Rodriguez MD  Hospitalist Service  [facility]  Securely message with the Vocera Web Console (learn more here)  Text page via [employer] Paging/Directory         Clinically Significant Risk Factors Present on Admission                      ______________________________________________________________________    Interval History       Data reviewed today: I reviewed all medications, new labs and imaging results over the last 24 hours. I personally reviewed no images or EKG's today.    Physical Exam   Vital Signs: Temp: 97.9  F (36.6  C) Temp src: Oral BP: 108/66 Pulse: 72   Resp: 18 SpO2: 96 % O2 Device: None (Room air)    Weight: 270 lbs 9.6 oz  Constitutional: awake, alert, cooperative, no apparent distress  Neurologic: Awake, alert,   Neuropsychiatric: General: normal, calm and normal eye contact    Data   Recent Labs   Lab 10/11/22  1404 10/11/22  0740 10/10/22  0759 10/09/22  2106 10/09/22  0817 10/08/22  1831 10/08/22  1112 10/07/22  0813 10/06/22  1510   WBC  --   --   --   --  6.1  --  6.5  --   --    HGB  --   --   --   --  14.7  --  14.6  --   --    MCV  --   --   --   --  88  --  88  --   --    PLT  --   --   --   --  272  --  248  --  254   NA  --   --   --   --   --   --  137  --   --    POTASSIUM 3.2* 3.1* 3.8  --  3.6   < > 3.4 3.4 3.8   CHLORIDE  --   --   --   --   --   --  101  --   --    CO2  --   --   --   --   --   --  27  --   --    BUN  --   --   --   --   --   --  13  --   --    CR  --   --   --   --   --   --  0.65 0.68  --    ANIONGAP  --   --   --   --   --   --  9  --   --    ASHIA  --   --   --   --   --   --  8.9  --   --    GLC  --   --   --  90  --   --  90  --   --     < > = values in this interval not displayed.     No results found for this or any previous visit (from the past 24  hour(s)).  Medications     - MEDICATION INSTRUCTIONS -         ARIPiprazole  10 mg Oral QPM     aspirin  325 mg Oral Daily     atorvastatin  40 mg Oral QPM     enoxaparin ANTICOAGULANT  40 mg Subcutaneous Q12H     furosemide  20 mg Oral BID     [Held by provider] lisinopril  5 mg Oral Daily     miconazole   Topical BID     multivitamin w/minerals  1 tablet Oral Daily     OLANZapine  2.5 mg Oral At Bedtime     polyethylene glycol  17 g Oral Daily     potassium chloride  40 mEq Oral or Feeding Tube Once     potassium chloride  20 mEq Oral Daily     potassium chloride  40 mEq Oral Once     senna-docusate  1-2 tablet Oral BID     sertraline  50 mg Oral Daily     sodium chloride (PF)  3 mL Intracatheter Q8H     spironolactone  12.5 mg Oral Daily

## 2022-10-12 NOTE — PROGRESS NOTES
[facility] Nurse Inpatient Assessment     Consulted for: Right lower back wound (Stage 3 HAPI) and Left medial thigh wound (stage 2)    Areas Assessed:      Areas visualized during today's visit: right skin fold, left medial thigh      Wound location: Right lower back in waist crease    Photo date: 10-12-22      Wound due to: Hospital Acquired Pressure injury stage 3   Stage: Unstageable 7/28, Following surgical debridement 7/30 Stage 3 Pressure Injury and Moisture Associated Skin Damage (MASD), suspect intertriginous pressure, appears to be deeper tissue damage within a crease, possible shear component from lift.    Wound history/plan of care: wound has greatly improved over past weeks, now appears somewhat hypertrophic and friable.  Will increase to daily dressing changes.  Wound base: 95 % red moist non granular tissue, slick and friable, 5% yellow adherent slough     Palpation of the wound bed: normal       Drainage: moderate      Description of drainage: serosanguinous, tan     Measurements (length x width x depth, in cm) 1.2 x 9.5 x 0.2cm      Tunneling N/A      Undermining NA  Periwound skin:  Area of erythema/rash 2 x 3.5 x 0cm superior to wound improving slowly; mild erythema and irritation remain in general      Color: red blanchable erythema      Temperature: normal to warm and sweaty  Odor: none    Pain: tender   Pain intervention: slow and gentle cares  Treatment goal: Heal   STATUS: improving but hypertrophic  Supplies ordered: at bedside     Pressure Injury Location: Left Medial thigh/groin    Last photo: 10-12-22      9/16      Wound type: Pressure Injury     Pressure Injury Stage: Deep Tissue Pressure Injury (DTPI), hospital acquired, now HAPI stage 2 pressure injury device to related purewick external catheter tubing (not the purewick device itself)   Wound history/plan of care:   Pt has had long (day 62 today 9-13-22) hospital course and unable to get up to the  "bathroom. Pt complaint of heavy legs and difficulty with moving independently.   Wound base: 100%hypopigmented epidermal tissue, healed.        Treatment Plan:     Right posterior waist crease: Daily  1. Remove dressings and discard. Wet Aquacel AG to ease removal if dry still.   2. Cleanse wound and periwound skin with Vashe ( #[id number]) solution and 4x4\" gauze, pat dry   3. Apply 3M No Sting barrier wipe to periwound skin, let dry   4. Cut piece Aquacel AG to size of wound and apply to wound bed.   5. Cover with Mepilex  6. Time/Date/Initial dressing change       Orders: Reviewed and Updated    RECOMMEND PRIMARY TEAM ORDER: None, at this time  Education provided: importance of repositioning, plan of care, wound progress, Moisture management and Off-loading pressure  Discussed plan of care with: Patient and Nurse  WOC nurse follow-up plan: weekly  Notify WOC if wound(s) deteriorate.  Nursing to notify the Provider(s) and re-consult the WOC Nurse if new skin concern.    DATA:     Current support surface: Bariatric Low air loss mattress    Containment of urine/stool: Incontinence Protocol and Incontinent pad in bed  BMI: Body mass index is 43.68 kg/m .   Active diet order: Orders Placed This Encounter      Advance Diet as Tolerated      Regular Diet Adult Thin Liquids (level 0) (Upright position, alert, and assist as needed)     Output: I/O last 3 completed shifts:  In: 100 [P.O.:100]  Out: -      Labs:   Recent Labs   Lab 10/09/22  0817   HGB 14.7   WBC 6.1     Pressure injury risk assessment:   Sensory Perception: 3-->slightly limited  Moisture: 3-->occasionally moist  Activity: 2-->chairfast  Mobility: 3-->slightly limited  Nutrition: 2-->probably inadequate  Friction and Shear: 2-->potential problem  Demetrio Score: 15    Talia Linda RN CWN   Dept. Pager: [phone]  Dept. Office Number: [phone]      "

## 2022-10-13 NOTE — PLAN OF CARE
Summary: KEILY VELASCO.   DATE & TIME:10/12/22 1900- 10/13/22 1502  Cognitive Concerns/ Orientation : A&O to self and place, disoriented to time/situation,   BEHAVIOR & AGGRESSION TOOL COLOR: Green  ABNL VS: VSS on RA.   MOBILITY: Up with assist of 2 lata steady. On pulsate mattress. Turn Q2hr. BLE elevated on pillows  PAIN MANAGMENT: denies pain  DIET: Regular. Needs encouragement to eat meals.  Encouraging oral fluids   BOWEL/BLADDER: Up with lata steady to get on BSC, can be Incontinent, no BM this shift. Bed hanley used this shift for urgent need to urinate.  ABNL LAB/BG: None new  DRAIN/DEVICES: LUE PIV SL   TELEMETRY RHYTHM: NA  SKIN: Mepilex Dressing to right flank/back CDI. Blanchable redness to coccyx/buttocks, perineum. BLE edema 2+, compression stockings on. Redness in folds, Miconazole powder applied.  TESTS/PROCEDURES: None  D/C DATE: Pending LTC placement.  Discharge Barriers: Placement  OTHER IMPORTANT INFO: SW following.   Continues to have scant amount of bleeding from her vaginal area, OB/GYN aware.

## 2022-10-13 NOTE — PLAN OF CARE
Goal Outcome Evaluation:DATE & TIME: 10/13/22 1932- [id number]  Cognitive Concerns/ Orientation : A&O to self and place, disoriented to time/situation,   BEHAVIOR & AGGRESSION TOOL COLOR: Green  ABNL VS: VSS on RA.   MOBILITY: Up with assist of 2 lata steady.or lift, On pulsate mattress. Turn Q2hr. BLE elevated on pillows  PAIN MANAGMENT: denies pain  DIET: Regular. Needs encouragement to eat meals.  Encouraging oral fluids, poor appetite   BOWEL/BLADDER: Up with lift to BSC,can be Incontinent, no BM this shift.  Uses bed pan at times to urinate.  ABNL LAB/BG: None new, K 3.3, refused this  am jacobo,rescheduled for 3pm   DRAIN/DEVICES: LUE PIV SL   TELEMETRY RHYTHM: NA  SKIN: Mepilex Dressing to right flank/back CDI. Blanchable redness to coccyx/buttocks, perineum. BLE edema 2+, compression stockings on. Redness in folds, Miconazole powder applied.  TESTS/PROCEDURES: None  D/C DATE: Pending LTC placement.  Discharge Barriers: Placement  OTHER IMPORTANT INFO: SW following.   No  vaginal  bleeding noted this shift. , no urine OP this shift, MD aware.

## 2022-10-13 NOTE — PROGRESS NOTES
[facility]    Medicine Progress Note - Hospitalist Service    Date of Admission:  7/13/2022    Assessment & Plan        Cristy Bailey is a 77 year old female with a PMHx significant for morbid obesity, hypertension, and hyperlipidemia, who presented to [facility] 7/13/2022 with complete left-sided paralysis, left-sided facial droop, and difficulty speaking. CTA head remarkable for right MCA occlusion. She was given tenecteplase and subsequently transferred to [facility] 7/13/2022 for thrombectomy.   Hospital stay complicated by flank wound which required surgical debridement and placement of wound vac and findings LTC placement.     Acute R MCA ischemic stroke with edema and right to left midline shift  S/p tenecteplase and subsequent R MCA mechanical thrombectomy 7/13/22  * Presented to [facility] 7/13 with complete paralysis, left-sided facial droop, and aphasia. Code stroke initiated. Head CT 7/13 showed signs of an evolving R MCA infarct. CTA head 7/13 showed a right carotid terminus occlusion, right middle cerebral artery M1  segment occlusion with poor opacification of the more distal right MCA branches/poor collateral flow. CTA neck 7/13 negative for acute occlusions. Pt given tenecteplase 7/13 at 13:11. Noted to be agitated and was subsequently intubated given need for procedure.   * Subsequently transferred to [facility] 7/13/2022 where she underwent above procedure.   7/14: Extubated. Head CT 7/14 showed evolution of acute infarct involving the R MCA distribution with increased swelling, cortical effacement, and new mild right-to-left midline shift; questionable hypoattenuation involving the bilateral occipital lobes which could be artifactual.   * Additional stroke workup pursued this stay -- echo 7/14 showed EF 50%, grade 1 diastolic dysfunction   * Started ASA and atorvastatin per stroke team.   * Repeat head CT 7/15 showed evolving R MCA stroke with areas of edema,  overall stable.  Plan:  -- Continue full dose aspirin  -- Goal SBP <140/90 and met   -- Monitored on telemetry for first several wks of hospital stay without abnl rhythm so can likely defer prior recommendations for 30d cardiac event monitor at discharge  -- Will need to follow up with [facility] in 6-8 wks  -- Waiting on placement     Vaginal Bleeding  Assessment: vaginal bleeding last couple days. Pelvis US shows abnormal endometrial thickening to 14 mm. Hgb stable.   Plan:  - OB/GYN consulted -> recommend outpatient evaluation for endometrial sampling in the near future.     Severe malnutrition in context of acute illness and chronic disease  * Nutritionist following. Appetite poor this stay, needing encouragement to take po.   *there is some discussion about needing feeding support, but patient does not want to have a feeding tube.  * 70 lb weight loss in the last few months (likely lower in the context of diuresis for CHF and inherent error in measuring)  Plan  - encourage oral intake  - patient does not want a feeding tube at this point and her health care agent dulce maria agrees with this, consistent with previous wishes     Anxiety   Cognitive impairment, confusion as above.   * As stay has progressed, patient has consistently been oriented x2 (to self, location), unable to state the year. Also noted to have intermittent situational confusion. States family members have been visiting when they have not.   * Ongoing issues with anxiety this stay -- had been on regimen of Seroquel 12.5mg at dinner and 25mg HS with 12.5mg po q6h prn available. Mirtazapine 7.5mg on 8/15 evening given decreased appetite  * Psych consulted for assistance with ongoing mgmt -- seen on 8/16 and meds adjusted. Seroquel HS changed to Abilify 5mg HS. Recommended Zyptexa 5mg q6h prn for breakthrough insomnia/agitation while going off Seroquel. Consider uptitration of Abilify per psych recs. Advised uptitration of sertraline (25mg x3d then increase  to 50mg daily beginning 8/20) and Remeron stopped. Also advised to liberalize Ativan to 0.5mg q6h prn.    * Seen by psych on 8/24, Abilify increased from 5mg to 10mg.  * PRN ativan stopped on 08/27/22 as leading to increased confusion. Hydroxyzine stopped  as well.   -- cont current regimen of meds including Abilify 5mg every evening, Zyprexa 2.5mg HS and sertraline 50mg daily  -- cont prns per psych including Zyprexa 5mg q6h prn     Goals of care, failure to thrive  Discussed with patient and significant other/Health care agent Leo. She remains confused. He reiterated her wishes for DNR/DNI and no feeding tubes   We discussed concern about overall prognosis, with her eating 0-25% of her meals and nutrition recommending alternative forms of nutritions  She has lost 70 lbs since admit (some of that is probably related to diuresis and some related to variable weights in hospital, but still significant)  Overall, placement in a facility has been difficult, and there is concern about her continued deteriorating under these circumstances  Plan  - now DNR/DNI  - see discussion regarding weight loss above  Palliative care meeting with Leo 9/28- he did not want hospice- she is no code blue and does not want artificial nutrition      Urinary retention: Resolved  * Retaining urine on 8/14. UA WNL, not worrisome for infection  * Requiring straight cath x1 on 8/20 PM  * Good UOP, no longer requiring straight cath. No evidence of obstruction on PVR.      Dysphagia due to CVA: Resolved  * Seen by SLP and noted with dysphagia. Initially required some intermittent fluid boluses.   * Was eventually able to advance to regular diet w/thin liquids     Dyslipidemia  * FLP this stay showed tot cholest 163, HDL 47, LDL 91, .   * Started on atorvastatin 40 mg daily     Volume overload dt diastolic CHF exacerbation: Improved  Essential hypertension  Hypokalemia, recurrent on lasix, and often refusing potassium   * Not on/needing  meds PTA.   * As above, echo this stay showed EF 50% with grade I diastolic dysfunction; RV not well visualized but appeared mild-moderately dilated with global systolic function probably mildly reduced.   * BPs were initially soft this stay and required IVFs. BPs improved.   * Developed pedal edema required IV Lasix. Ultimately transition to oral Lasix.  * Started on lisinopril this stay  * BPs improved during stay.   * Lasix decreased from 40mg in AM and 20mg in PM to 20mg BID on 9/3 and added spironolactone 25mg daily  Plan:  -- reduced lisinopril dose to 5mg daily with parameters on 9/14, will hold lisinopril completely for now as she needs diuretics  -- cont Lasix 20mg po BID  -- reduced spironolactone to 12.5mg daily with parameters on 9/14  -- cont KCl 20mEq po daily  -- f/u bp and adjust closely as indicated     Hypophosphatemia: Resolved  Hypokalemia     Refusing replacement    Encourage bananas     Prolonged QTc   * EKG on 7/13 showed QTc 494.   * Repeat EKG on 7/30 (while on Levaquin) showed QTc table at 475. Has since completed course of abx as below.  * Had been monitoring on telemetry this stay. No concerning findings so tele was ultimately dc'd 8/19     Chronic bilateral LE edema with chronic venous stasis dermatitis and chronic skin changes  Hx of LLE cellulitis  * Was hospitalized in 11/2021 for sepsis dt to pneumonia and LLE cellulitis.   * During this stay, BLE noted to be patty and edematous, no unilateral leg swelling appreciated; LLE had patchy areas of erythema, no fluctuance, no painful areas with palpation; legs warm to touch. Lymphedema consulted.   -- cont LE elevation, lymphedema wraps     Lower back/R flank wound/abscess, suspect dt pressure type injury, s/p surgical debridement on 7/30/22 and placement of wound vac on 8/2/22,  OA  Hx of bilateral hip replacements   Hx of chronic neck and back pain  * Pt with significant back pain early on in hospital stay. Was requiring IV  hydromorphone.  Dose had to be decreased dt somnolence.  * MRI L-spine in 2018 showed multilevel degenerative changes with mild central stenosis. Patient has chronic back pain, reports having it over the last 2 years.   * During this stay, patient was noted with 5-6cm by 2-3cm wound with swelling/fluid/blistering in a fold of her lower back, did not appear infected; this seemed to be the source of her pain. Padded dressing placed and WOC RN ordered. Pain initially improved.   * Was placed on course of Augmentin on 7/24.   * On 7/26, was re-evaluated by WOC RN. Wound appeared more erythematous and purulence expressed. Worsening with shear stress from lift. Procal <0.05, WBC WNL. Abx changed to IV clindamycin.  * Wound cultures obtained. On 7/28, wound grew proteus and staph aureus (MRSA neg). US neg for abscess.   * Lost IV access on 7/28 so abx were changed to oral clindamycin and oral levaquin. IV access re-established but was continued on oral abx.  * General surgery consulted, ultimately underwent excisional debridement of R flank abscess in OR on 7/30. Intraop cultures showed polymicrobial growth with proteus mirabilis x2 strains (both pan-sensitive), corynebacterium striatum and enterococcus faecalis.  * Wound vac placed per general surgery on 8/2 >> subsequently removed during stay.  * ID consulted on 8/2 and abx narrowed to Augmentin alone, completed an additional 5 days of treatment on 8/7.   -- wound per WOC RN, follows weekly  -- prns available for pain  -- cont regular repositioning     Pressure Injury, left Medial thigh/groin  Pressure Injury Stage: Deep Tissue Pressure Injury (DTPI), hospital acquired, now HAPI stage 2 pressure injury device related purwick external catheter tubing (not the purwick device itself)  - Wound care following     Pressure injury bilateral buttocks  * Noted by nursing on 09/02/22. WOC RN following as above.      Suspected meningioma left parietal region, incidentally seen on  admission CT on 7/13  * Head CT 7/13 showed a calcified extra-axial mass overlying the left parietal region measuring 1.4 cm, potentially representing a meningioma.  * Will need serial monitoring OP after discharge.      Anemia, suspect dilutional component: Resolved  * Hgb normal on admit. Hgb 11.5 on 7/16. No overt clinical signs of major bleeding.  * Hgb now stable at 12-13      ntertriginous dermatitis  * Chronic and stable, cont clotrimazole powder     Constipation  * Continue sched bowel regimen     Morbid obesity  * BMI 59 on admission. Recommend aggressive dietary and lifestyle modifications as condition improves     Suspected sleep apnea  * O2 drop to mid 80s overnight 8/14. Briefly placed on 4LPM, which she then removed and then slept okay with sats in 90s thereafter.  * Recommend sleep study as outpatient     Malnutrition:    - Level of malnutrition: Severe   Severe malnutrition. In Context of:  Acute illness or injury  Chronic illness or disease  Malnutrition: (8/31)   % Weight Loss:  > 5% in 1 month (severe malnutrition) - significant loss over this admit  % Intake:  </= 50% for >/= 5 days (severe malnutrition) - consistent this admit x49 days  Subcutaneous Fat Loss:  Orbital region moderate depletion - visual, per 8/16 note  Muscle Loss:  Temporal region moderate depletion - visual, per 8/16 note  Fluid Retention:  Mild generalized edema  - dietitian following, recommend alternative feeding source? Not clear she would tolerate a tube feed with her confusion, will monitor, perhaps consider some carb counting    10/13- not eating much. Trying to replace K.  Continue present care.        Diet: Advance Diet as Tolerated  Regular Diet Adult Thin Liquids (level 0) (Upright position, alert, and assist as needed)  Room Service    DVT Prophylaxis: Pneumatic Compression Devices  [place] Catheter: Not present  Central Lines: None  Cardiac Monitoring: None  Code Status: No CPR- Do NOT Intubate      Disposition Plan       Expected Discharge Date: 10/17/2022    Discharge Delays: Placement - LTC  *Medically Ready for Discharge    Discharge Comments: Multiple referrerals outl  Did receive her 2nd covid shot  LTC placement        The patient's care was discussed with the Patient/nurse    Donal Rodriguez MD  Hospitalist Service  [facility]  Securely message with the Vocera Web Console (learn more here)  Text page via [employer] Paging/Directory         Clinically Significant Risk Factors Present on Admission                      ______________________________________________________________________    Interval History   Feeling a little cold this morning. No cough, chest pain or shortness of breath     Data reviewed today: I reviewed all medications, new labs and imaging results over the last 24 hours. I personally reviewed no images or EKG's today.    Physical Exam   Vital Signs: Temp: 97.4  F (36.3  C) Temp src: Oral BP: 102/55 Pulse: 65   Resp: 16 SpO2: 95 % O2 Device: None (Room air)    Weight: 275 lbs 2.15 oz  Constitutional: awake, alert, cooperative, no apparent distress  Neurologic: Awake, alert,   Neuropsychiatric: General: normal, calm and normal eye contact    Data   Recent Labs   Lab 10/13/22  0837 10/12/22  0904 10/11/22  1404 10/11/22  0740 10/10/22  0759 10/09/22  2106 10/09/22  0817 10/08/22  1831 10/08/22  1112 10/07/22  0813   WBC  --   --   --   --   --   --  6.1  --  6.5  --    HGB  --   --   --   --   --   --  14.7  --  14.6  --    MCV  --   --   --   --   --   --  88  --  88  --     276  --   --   --   --  272  --  248  --    NA  --   --   --   --   --   --   --   --  137  --    POTASSIUM  --  3.3* 3.2* 3.1*   < >  --  3.6   < > 3.4 3.4   CHLORIDE  --   --   --   --   --   --   --   --  101  --    CO2  --   --   --   --   --   --   --   --  27  --    BUN  --   --   --   --   --   --   --   --  13  --    CR  --   --   --   --   --   --   --   --  0.65 0.68   ANIONGAP  --   --    --   --   --   --   --   --  9  --    ASHIA  --   --   --   --   --   --   --   --  8.9  --    GLC  --   --   --   --   --  90  --   --  90  --     < > = values in this interval not displayed.     No results found for this or any previous visit (from the past 24 hour(s)).  Medications     - MEDICATION INSTRUCTIONS -         ARIPiprazole  10 mg Oral QPM     aspirin  325 mg Oral Daily     atorvastatin  40 mg Oral QPM     enoxaparin ANTICOAGULANT  40 mg Subcutaneous Q12H     furosemide  20 mg Oral BID     [Held by provider] lisinopril  5 mg Oral Daily     miconazole   Topical BID     multivitamin w/minerals  1 tablet Oral Daily     OLANZapine  2.5 mg Oral At Bedtime     polyethylene glycol  17 g Oral Daily     potassium chloride  40 mEq Oral or Feeding Tube Once     potassium chloride  20 mEq Oral Daily     potassium chloride  40 mEq Oral Once     senna-docusate  1-2 tablet Oral BID     sertraline  50 mg Oral Daily     sodium chloride (PF)  3 mL Intracatheter Q8H     spironolactone  12.5 mg Oral Daily

## 2022-10-14 NOTE — PLAN OF CARE
Goal Outcome Evaluation:      Plan of Care Reviewed With: patient    Overall Patient Progress: no changeOverall Patient Progress: no change         Summary: KEILY VELASCO.   DATE & TIME: 10/13/22 pm shift  Cognitive Concerns/ Orientation : A& disoriented to situation.  BEHAVIOR & AGGRESSION TOOL COLOR: Green  ABNL VS: VSS on RA.   MOBILITY: Up with assist of 2 lata steady.or lift, On pulsate mattress. Turn Q2hr. BLE elevated on pillows  PAIN MANAGMENT: denies pain  DIET: Regular. Needs encouragement to eat meals.  Encouraging oral fluids, poor appetite   BOWEL/BLADDER: Up with lift to BSC,can be Incontinent, no BM this shift.  Uses bed pan at times to urinate.  ABNL LAB/BG: None new, K 3.3, rechked 3.8  DRAIN/DEVICES: LUE PIV SL   TELEMETRY RHYTHM: NA  SKIN: Mepilex Dressing to right flank/back CDI. Blanchable redness to coccyx/buttocks, perineum. BLE edema 2+, compression stockings on. Redness in folds, Miconazole powder applied.  TESTS/PROCEDURES: None  D/C DATE: Pending LTC placement.  Discharge Barriers: Placement  OTHER IMPORTANT INFO: SW following.   No  vaginal  bleeding noted this shift.

## 2022-10-14 NOTE — PROGRESS NOTES
Care Management Follow Up    Length of Stay (days): 92    Expected Discharge Date: 10/17/2022     Concerns to be Addressed:       Patient plan of care discussed at interdisciplinary rounds: Yes    Anticipated Discharge Disposition: Skilled Nursing Facility     Anticipated Discharge Services:    Anticipated Discharge DME:      Patient/family educated on Medicare website which has current facility and service quality ratings:    Education Provided on the Discharge Plan:    Patient/Family in Agreement with the Plan: yes    Referrals Placed by CM/SW:    Private pay costs discussed:     Additional Information:  Calls were made on 10/12 to LTC facilities and of those who had vacancies or were open to receiving referrals, hand faxes were sent to the following facilities which can provide skilled nursing care, therapy with long term care ability;   [facility]  [facility]  Critical access hospital Home- declined as the room available is a double room with the roommate having bariatric DME thus the room will not accommodate another roommate with bariatric DME.       Keri Stone, LICSW

## 2022-10-14 NOTE — PLAN OF CARE
Goal Outcome Evaluation:      Plan of Care Reviewed With: patient    Overall Patient Progress: no changeOverall Patient Progress: no change         Summary: KEILY VELASCO.   DATE & TIME: 10/14/22 Night shift  Cognitive Concerns/ Orientation : A& disoriented to situation.  BEHAVIOR & AGGRESSION TOOL COLOR: Green  ABNL VS: VSS on RA.   MOBILITY: Up with assist of 2 lata steady.or lift, On pulsate mattress. Turn Q2hr. BLE elevated on pillows but patient refuse a times.  PAIN MANAGMENT: denies pain  DIET: Regular. Needs encouragement to eat meals.  Encouraging oral fluids, poor appetite   BOWEL/BLADDER: Up with lift to BSC,can be Incontinent, no BM this shift.  Uses bed pan at times to urinate.  ABNL LAB/BG: None  DRAIN/DEVICES: LUE PIV SL   TELEMETRY RHYTHM: NA  SKIN: Mepilex Dressing to right flank/back CDI. Blanchable redness to coccyx/buttocks, perineum. BLE edema 2+, Edema wear on. Redness in folds, Miconazole powder applied.  TESTS/PROCEDURES: None  D/C DATE: Pending LTC placement.  Discharge Barriers: Placement  OTHER IMPORTANT INFO: SW following. Had small vaginal bleeding.

## 2022-10-14 NOTE — PLAN OF CARE
Goal Outcome Evaluation:  DATE & TIME: 10/14/22 [id number]- [id number]  Cognitive Concerns/ Orientation : A& disoriented to situation.  BEHAVIOR & AGGRESSION TOOL COLOR: Green  ABNL VS: VSS on RA.   MOBILITY: Up with assist of 2 lata steady.or lift, On pulsate mattress. Turn Q2hr. BLE elevated on pillows but patient refuse a times.  PAIN MANAGMENT: denies pain  DIET: Regular. Needs encouragement to eat meals.  Encouraging oral fluids, poor appetite   BOWEL/BLADDER: Up with lift to C,can be Incontinent, no BM this shift.  Uses bed pan at times to urinate.  ABNL LAB/BG: None  DRAIN/DEVICES: LUE PIV SL   TELEMETRY RHYTHM: NA  SKIN: Mepilex Dressing to right flank/back changed,Blanchable redness to coccyx/buttocks, perineum. BLE edema 2+, Edema wear on. Redness in folds, Miconazole powder applied.  TESTS/PROCEDURES: None  D/C DATE: Pending LTC placement.  Discharge Barriers: Placement  OTHER IMPORTANT INFO: SW following. Had small vaginal bleeding.

## 2022-10-14 NOTE — PROGRESS NOTES
[facility]    Medicine Progress Note - Hospitalist Service    Date of Admission:  7/13/2022    Assessment & Plan        Cristy Bailey is a 77 year old female with a PMHx significant for morbid obesity, hypertension, and hyperlipidemia, who presented to [facility] 7/13/2022 with complete left-sided paralysis, left-sided facial droop, and difficulty speaking. CTA head remarkable for right MCA occlusion. She was given tenecteplase and subsequently transferred to [facility] 7/13/2022 for thrombectomy.   Hospital stay complicated by flank wound which required surgical debridement and placement of wound vac and findings LTC placement.     Acute R MCA ischemic stroke with edema and right to left midline shift  S/p tenecteplase and subsequent R MCA mechanical thrombectomy 7/13/22  * Presented to [facility] 7/13 with complete paralysis, left-sided facial droop, and aphasia. Code stroke initiated. Head CT 7/13 showed signs of an evolving R MCA infarct. CTA head 7/13 showed a right carotid terminus occlusion, right middle cerebral artery M1  segment occlusion with poor opacification of the more distal right MCA branches/poor collateral flow. CTA neck 7/13 negative for acute occlusions. Pt given tenecteplase 7/13 at 13:11. Noted to be agitated and was subsequently intubated given need for procedure.   * Subsequently transferred to [facility] 7/13/2022 where she underwent above procedure.   7/14: Extubated. Head CT 7/14 showed evolution of acute infarct involving the R MCA distribution with increased swelling, cortical effacement, and new mild right-to-left midline shift; questionable hypoattenuation involving the bilateral occipital lobes which could be artifactual.   * Additional stroke workup pursued this stay -- echo 7/14 showed EF 50%, grade 1 diastolic dysfunction   * Started ASA and atorvastatin per stroke team.   * Repeat head CT 7/15 showed evolving R MCA stroke with areas of edema,  overall stable.  Plan:  -- Continue full dose aspirin  -- Goal SBP <140/90 and met   -- Monitored on telemetry for first several wks of hospital stay without abnl rhythm so can likely defer prior recommendations for 30d cardiac event monitor at discharge  -- Will need to follow up with [facility] in 6-8 wks  -- Waiting on placement     Vaginal Bleeding  Assessment: vaginal bleeding last couple days. Pelvis US shows abnormal endometrial thickening to 14 mm. Hgb stable.   Plan:  - OB/GYN consulted -> recommend outpatient evaluation for endometrial sampling in the near future.     Severe malnutrition in context of acute illness and chronic disease  * Nutritionist following. Appetite poor this stay, needing encouragement to take po.   *there is some discussion about needing feeding support, but patient does not want to have a feeding tube.  * 70 lb weight loss in the last few months (likely lower in the context of diuresis for CHF and inherent error in measuring)  Plan  - encourage oral intake  - patient does not want a feeding tube at this point and her health care agent dulce maria agrees with this, consistent with previous wishes     Anxiety   Cognitive impairment, confusion as above.   * As stay has progressed, patient has consistently been oriented x2 (to self, location), unable to state the year. Also noted to have intermittent situational confusion. States family members have been visiting when they have not.   * Ongoing issues with anxiety this stay -- had been on regimen of Seroquel 12.5mg at dinner and 25mg HS with 12.5mg po q6h prn available. Mirtazapine 7.5mg on 8/15 evening given decreased appetite  * Psych consulted for assistance with ongoing mgmt -- seen on 8/16 and meds adjusted. Seroquel HS changed to Abilify 5mg HS. Recommended Zyptexa 5mg q6h prn for breakthrough insomnia/agitation while going off Seroquel. Consider uptitration of Abilify per psych recs. Advised uptitration of sertraline (25mg x3d then increase  to 50mg daily beginning 8/20) and Remeron stopped. Also advised to liberalize Ativan to 0.5mg q6h prn.    * Seen by psych on 8/24, Abilify increased from 5mg to 10mg.  * PRN ativan stopped on 08/27/22 as leading to increased confusion. Hydroxyzine stopped  as well.   -- cont current regimen of meds including Abilify 5mg every evening, Zyprexa 2.5mg HS and sertraline 50mg daily  -- cont prns per psych including Zyprexa 5mg q6h prn     Goals of care, failure to thrive  Discussed with patient and significant other/Health care agent Leo. She remains confused. He reiterated her wishes for DNR/DNI and no feeding tubes   We discussed concern about overall prognosis, with her eating 0-25% of her meals and nutrition recommending alternative forms of nutritions  She has lost 70 lbs since admit (some of that is probably related to diuresis and some related to variable weights in hospital, but still significant)  Overall, placement in a facility has been difficult, and there is concern about her continued deteriorating under these circumstances  Plan  - now DNR/DNI  - see discussion regarding weight loss above  Palliative care meeting with Leo 9/28- he did not want hospice- she is no code blue and does not want artificial nutrition      Urinary retention: Resolved  * Retaining urine on 8/14. UA WNL, not worrisome for infection  * Requiring straight cath x1 on 8/20 PM  * Good UOP, no longer requiring straight cath. No evidence of obstruction on PVR.      Dysphagia due to CVA: Resolved  * Seen by SLP and noted with dysphagia. Initially required some intermittent fluid boluses.   * Was eventually able to advance to regular diet w/thin liquids     Dyslipidemia  * FLP this stay showed tot cholest 163, HDL 47, LDL 91, .   * Started on atorvastatin 40 mg daily     Volume overload dt diastolic CHF exacerbation: Improved  Essential hypertension  Hypokalemia, recurrent on lasix, and often refusing potassium   * Not on/needing  meds PTA.   * As above, echo this stay showed EF 50% with grade I diastolic dysfunction; RV not well visualized but appeared mild-moderately dilated with global systolic function probably mildly reduced.   * BPs were initially soft this stay and required IVFs. BPs improved.   * Developed pedal edema required IV Lasix. Ultimately transition to oral Lasix.  * Started on lisinopril this stay  * BPs improved during stay.   * Lasix decreased from 40mg in AM and 20mg in PM to 20mg BID on 9/3 and added spironolactone 25mg daily  Plan:  -- reduced lisinopril dose to 5mg daily with parameters on 9/14, will hold lisinopril completely for now as she needs diuretics  -- cont Lasix 20mg po BID  -- reduced spironolactone to 12.5mg daily with parameters on 9/14  -- cont KCl 20mEq po daily  -- f/u bp and adjust closely as indicated     Hypophosphatemia: Resolved  Hypokalemia     Refusing replacement    Encourage bananas     Prolonged QTc   * EKG on 7/13 showed QTc 494.   * Repeat EKG on 7/30 (while on Levaquin) showed QTc table at 475. Has since completed course of abx as below.  * Had been monitoring on telemetry this stay. No concerning findings so tele was ultimately dc'd 8/19     Chronic bilateral LE edema with chronic venous stasis dermatitis and chronic skin changes  Hx of LLE cellulitis  * Was hospitalized in 11/2021 for sepsis dt to pneumonia and LLE cellulitis.   * During this stay, BLE noted to be patty and edematous, no unilateral leg swelling appreciated; LLE had patchy areas of erythema, no fluctuance, no painful areas with palpation; legs warm to touch. Lymphedema consulted.   -- cont LE elevation, lymphedema wraps     Lower back/R flank wound/abscess, suspect dt pressure type injury, s/p surgical debridement on 7/30/22 and placement of wound vac on 8/2/22,  OA  Hx of bilateral hip replacements   Hx of chronic neck and back pain  * Pt with significant back pain early on in hospital stay. Was requiring IV  hydromorphone.  Dose had to be decreased dt somnolence.  * MRI L-spine in 2018 showed multilevel degenerative changes with mild central stenosis. Patient has chronic back pain, reports having it over the last 2 years.   * During this stay, patient was noted with 5-6cm by 2-3cm wound with swelling/fluid/blistering in a fold of her lower back, did not appear infected; this seemed to be the source of her pain. Padded dressing placed and WOC RN ordered. Pain initially improved.   * Was placed on course of Augmentin on 7/24.   * On 7/26, was re-evaluated by WOC RN. Wound appeared more erythematous and purulence expressed. Worsening with shear stress from lift. Procal <0.05, WBC WNL. Abx changed to IV clindamycin.  * Wound cultures obtained. On 7/28, wound grew proteus and staph aureus (MRSA neg). US neg for abscess.   * Lost IV access on 7/28 so abx were changed to oral clindamycin and oral levaquin. IV access re-established but was continued on oral abx.  * General surgery consulted, ultimately underwent excisional debridement of R flank abscess in OR on 7/30. Intraop cultures showed polymicrobial growth with proteus mirabilis x2 strains (both pan-sensitive), corynebacterium striatum and enterococcus faecalis.  * Wound vac placed per general surgery on 8/2 >> subsequently removed during stay.  * ID consulted on 8/2 and abx narrowed to Augmentin alone, completed an additional 5 days of treatment on 8/7.   -- wound per WOC RN, follows weekly  -- prns available for pain  -- cont regular repositioning     Pressure Injury, left Medial thigh/groin  Pressure Injury Stage: Deep Tissue Pressure Injury (DTPI), hospital acquired, now HAPI stage 2 pressure injury device related purwick external catheter tubing (not the purwick device itself)  - Wound care following     Pressure injury bilateral buttocks  * Noted by nursing on 09/02/22. WOC RN following as above.      Suspected meningioma left parietal region, incidentally seen on  admission CT on 7/13  * Head CT 7/13 showed a calcified extra-axial mass overlying the left parietal region measuring 1.4 cm, potentially representing a meningioma.  * Will need serial monitoring OP after discharge.      Anemia, suspect dilutional component: Resolved  * Hgb normal on admit. Hgb 11.5 on 7/16. No overt clinical signs of major bleeding.  * Hgb now stable at 12-13      ntertriginous dermatitis  * Chronic and stable, cont clotrimazole powder     Constipation  * Continue sched bowel regimen     Morbid obesity  * BMI 59 on admission. Recommend aggressive dietary and lifestyle modifications as condition improves     Suspected sleep apnea  * O2 drop to mid 80s overnight 8/14. Briefly placed on 4LPM, which she then removed and then slept okay with sats in 90s thereafter.  * Recommend sleep study as outpatient     Malnutrition:    - Level of malnutrition: Severe   Severe malnutrition. In Context of:  Acute illness or injury  Chronic illness or disease  Malnutrition: (8/31)   % Weight Loss:  > 5% in 1 month (severe malnutrition) - significant loss over this admit  % Intake:  </= 50% for >/= 5 days (severe malnutrition) - consistent this admit x49 days  Subcutaneous Fat Loss:  Orbital region moderate depletion - visual, per 8/16 note  Muscle Loss:  Temporal region moderate depletion - visual, per 8/16 note  Fluid Retention:  Mild generalized edema  - dietitian following, recommend alternative feeding source? Not clear she would tolerate a tube feed with her confusion, will monitor, perhaps consider some carb counting    10/14- stable, no new issues       Diet: Advance Diet as Tolerated  Regular Diet Adult Thin Liquids (level 0) (Upright position, alert, and assist as needed)  Room Service    DVT Prophylaxis: Pneumatic Compression Devices  [place] Catheter: Not present  Central Lines: None  Cardiac Monitoring: None  Code Status: No CPR- Do NOT Intubate      Disposition Plan      Expected Discharge Date: 10/18/2022     Discharge Delays: Placement - LTC  *Medically Ready for Discharge    Discharge Comments: Multiple referrerals outl  Did receive her 2nd covid shot  LTC placement        The patient's care was discussed with the Patient/nurse    Donal Rodriguez MD  Hospitalist Service  [facility]  Securely message with the Vocera Web Console (learn more here)  Text page via [employer] Paging/Directory         Clinically Significant Risk Factors Present on Admission                      ______________________________________________________________________    Interval History   Not cold this morning- eating cereal.      Data reviewed today: I reviewed all medications, new labs and imaging results over the last 24 hours. I personally reviewed no images or EKG's today.    Physical Exam   Vital Signs: Temp: 97.2  F (36.2  C) Temp src: Oral BP: 100/66 Pulse: 77   Resp: 16 SpO2: 91 % O2 Device: None (Room air)    Weight: 275 lbs 2.15 oz  Constitutional: awake, alert, cooperative, no apparent distress  Neurologic: Awake, alert,   Neuropsychiatric: General: normal, calm and normal eye contact    Data   Recent Labs   Lab 10/14/22  0742 10/13/22  1548 10/13/22  0837 10/12/22  0904 10/11/22  1404 10/10/22  0759 10/09/22  2106 10/09/22  0817 10/08/22  1831 10/08/22  1112   WBC  --   --   --   --   --   --   --  6.1  --  6.5   HGB  --   --   --   --   --   --   --  14.7  --  14.6   MCV  --   --   --   --   --   --   --  88  --  88     --  262 276  --   --   --  272  --  248   NA  --   --   --   --   --   --   --   --   --  137   POTASSIUM  --  3.8  --  3.3* 3.2*   < >  --  3.6   < > 3.4   CHLORIDE  --   --   --   --   --   --   --   --   --  101   CO2  --   --   --   --   --   --   --   --   --  27   BUN  --   --   --   --   --   --   --   --   --  13   CR  --   --   --   --   --   --   --   --   --  0.65   ANIONGAP  --   --   --   --   --   --   --   --   --  9   ASHIA  --   --   --   --   --   --   --   --    --  8.9   GLC  --   --   --   --   --   --  90  --   --  90    < > = values in this interval not displayed.     No results found for this or any previous visit (from the past 24 hour(s)).  Medications     - MEDICATION INSTRUCTIONS -         ARIPiprazole  10 mg Oral QPM     aspirin  325 mg Oral Daily     atorvastatin  40 mg Oral QPM     enoxaparin ANTICOAGULANT  40 mg Subcutaneous Q12H     furosemide  20 mg Oral BID     [Held by provider] lisinopril  5 mg Oral Daily     miconazole   Topical BID     multivitamin w/minerals  1 tablet Oral Daily     OLANZapine  2.5 mg Oral At Bedtime     polyethylene glycol  17 g Oral Daily     potassium chloride  40 mEq Oral or Feeding Tube Once     potassium chloride  20 mEq Oral Daily     potassium chloride  40 mEq Oral Once     senna-docusate  1-2 tablet Oral BID     sertraline  50 mg Oral Daily     sodium chloride (PF)  3 mL Intracatheter Q8H     spironolactone  12.5 mg Oral Daily

## 2022-10-15 NOTE — PLAN OF CARE
Goal Outcome Evaluation:DATE & TIME: 10/15/22 1100- 1530  Cognitive Concerns/ Orientation : A&O x2,  disoriented to time and situation.  BEHAVIOR & AGGRESSION TOOL COLOR: Green  ABNL VS: VSS on RA.   MOBILITY: Up with assist of 2 with lata steady or lift, on pulsate mattress. Turn Q2hr. BLE elevated on pillows. Up in chair for few hrs.  PAIN MANAGMENT: denies pain  DIET: Regular. Needs encouragement to eat and drink, poor appetite.   BOWEL/BLADDER: Incontinent at times, no BM this shift  Up to the BSC or uses bed pan at times.  ABNL LAB/BG: k 3.1, had scheduled Potassium, also replaced per protocol, recheck 3.9.  DRAIN/DEVICES: LUE PIV SL   TELEMETRY RHYTHM: NA  SKIN: Mepilex dressing to R flank/back changed,Blanchable redness to coccyx/buttocks, perineum . BLE edema 2+, Redness in folds and under the breast Miconazole powder applied.  TESTS/PROCEDURES: None  D/C DATE: Pending LTC placement.  Discharge Barriers: Placement  OTHER IMPORTANT INFO: SW following. Had scant vaginal bleeding this shift.

## 2022-10-15 NOTE — PLAN OF CARE
Summary: KEILY VELASCO.   DATE & TIME: 10/15/22 [id number]  Cognitive Concerns/ Orientation : A&O x2,  disoriented to time and situation.  BEHAVIOR & AGGRESSION TOOL COLOR: Green  ABNL VS: VSS on RA.   MOBILITY: Up with assist of 2 with lata steady or lift, on pulsate mattress. Turn Q2hr. BLE elevated on pillows.  PAIN MANAGMENT: denies pain  DIET: Regular. Needs encouragement to eat meals.  Encouraging oral fluids, poor appetite   BOWEL/BLADDER: Incontinent at times, no BM this shift.  Up to the BSC or uses bed pan at times to urinate.  ABNL LAB/BG: None  DRAIN/DEVICES: LUE PIV SL   TELEMETRY RHYTHM: NA  SKIN: Mepilex dressing to R flank/back CDI; Blanchable redness to coccyx/buttocks, perineum. BLE edema 2+, edema wear off overnight. Redness in folds, Miconazole powder applied.  TESTS/PROCEDURES: None  D/C DATE: Pending LTC placement.  Discharge Barriers: Placement  OTHER IMPORTANT INFO: SW following. Had scant vaginal bleeding.

## 2022-10-15 NOTE — PLAN OF CARE
Goal Outcome Evaluation:  Alert and oriented to person and place. T&Rq2h. Refused dinner. Small amount of vaginal bleeding. Up with lift. Takes pills whole, one at a time. Dressing on right flank is cdi. Rash on abdominal folds.  2-3+ edema on rickie LEs.

## 2022-10-15 NOTE — PROGRESS NOTES
[facility]    Medicine Progress Note - Hospitalist Service    Date of Admission:  7/13/2022    Assessment & Plan        Cristy Bailey is a 77 year old female with a PMHx significant for morbid obesity, hypertension, and hyperlipidemia, who presented to [facility] 7/13/2022 with complete left-sided paralysis, left-sided facial droop, and difficulty speaking. CTA head remarkable for right MCA occlusion. She was given tenecteplase and subsequently transferred to [facility] 7/13/2022 for thrombectomy.   Hospital stay complicated by flank wound which required surgical debridement and placement of wound vac and findings LTC placement.     Acute R MCA ischemic stroke with edema and right to left midline shift  S/p tenecteplase and subsequent R MCA mechanical thrombectomy 7/13/22  * Presented to [facility] 7/13 with complete paralysis, left-sided facial droop, and aphasia. Code stroke initiated. Head CT 7/13 showed signs of an evolving R MCA infarct. CTA head 7/13 showed a right carotid terminus occlusion, right middle cerebral artery M1  segment occlusion with poor opacification of the more distal right MCA branches/poor collateral flow. CTA neck 7/13 negative for acute occlusions. Pt given tenecteplase 7/13 at 13:11. Noted to be agitated and was subsequently intubated given need for procedure.   * Subsequently transferred to [facility] 7/13/2022 where she underwent above procedure.   7/14: Extubated. Head CT 7/14 showed evolution of acute infarct involving the R MCA distribution with increased swelling, cortical effacement, and new mild right-to-left midline shift; questionable hypoattenuation involving the bilateral occipital lobes which could be artifactual.   * Additional stroke workup pursued this stay -- echo 7/14 showed EF 50%, grade 1 diastolic dysfunction   * Started ASA and atorvastatin per stroke team.   * Repeat head CT 7/15 showed evolving R MCA stroke with areas of edema,  overall stable.  Plan:  -- Continue full dose aspirin  -- Goal SBP <140/90 and met   -- Monitored on telemetry for first several wks of hospital stay without abnl rhythm so can likely defer prior recommendations for 30d cardiac event monitor at discharge  -- Will need to follow up with [facility] in 6-8 wks  -- Waiting on placement     Vaginal Bleeding  Assessment: vaginal bleeding last couple days. Pelvis US shows abnormal endometrial thickening to 14 mm. Hgb stable.   Plan:  - OB/GYN consulted -> recommend outpatient evaluation for endometrial sampling in the near future.     Severe malnutrition in context of acute illness and chronic disease  * Nutritionist following. Appetite poor this stay, needing encouragement to take po.   *there is some discussion about needing feeding support, but patient does not want to have a feeding tube.  * 70 lb weight loss in the last few months (likely lower in the context of diuresis for CHF and inherent error in measuring)  Plan  - encourage oral intake  - patient does not want a feeding tube at this point and her health care agent dulce maria agrees with this, consistent with previous wishes     Anxiety   Cognitive impairment, confusion as above.   * As stay has progressed, patient has consistently been oriented x2 (to self, location), unable to state the year. Also noted to have intermittent situational confusion. States family members have been visiting when they have not.   * Ongoing issues with anxiety this stay -- had been on regimen of Seroquel 12.5mg at dinner and 25mg HS with 12.5mg po q6h prn available. Mirtazapine 7.5mg on 8/15 evening given decreased appetite  * Psych consulted for assistance with ongoing mgmt -- seen on 8/16 and meds adjusted. Seroquel HS changed to Abilify 5mg HS. Recommended Zyptexa 5mg q6h prn for breakthrough insomnia/agitation while going off Seroquel. Consider uptitration of Abilify per psych recs. Advised uptitration of sertraline (25mg x3d then increase  to 50mg daily beginning 8/20) and Remeron stopped. Also advised to liberalize Ativan to 0.5mg q6h prn.    * Seen by psych on 8/24, Abilify increased from 5mg to 10mg.  * PRN ativan stopped on 08/27/22 as leading to increased confusion. Hydroxyzine stopped  as well.   -- cont current regimen of meds including Abilify 5mg every evening, Zyprexa 2.5mg HS and sertraline 50mg daily  -- cont prns per psych including Zyprexa 5mg q6h prn     Goals of care, failure to thrive  Discussed with patient and significant other/Health care agent Leo. She remains confused. He reiterated her wishes for DNR/DNI and no feeding tubes   We discussed concern about overall prognosis, with her eating 0-25% of her meals and nutrition recommending alternative forms of nutritions  She has lost 70 lbs since admit (some of that is probably related to diuresis and some related to variable weights in hospital, but still significant)  Overall, placement in a facility has been difficult, and there is concern about her continued deteriorating under these circumstances  Plan  - now DNR/DNI  - see discussion regarding weight loss above  Palliative care met with Leo 9/28- he did not want hospice- she is no code blue and does not want artificial nutrition      Urinary retention: Resolved  * Retaining urine on 8/14. UA WNL, not worrisome for infection  * Requiring straight cath x1 on 8/20 PM  * Good UOP, no longer requiring straight cath. No evidence of obstruction on PVR.      Dysphagia due to CVA: Resolved  * Seen by SLP and noted with dysphagia. Initially required some intermittent fluid boluses.   * Was eventually able to advance to regular diet w/thin liquids     Dyslipidemia  * FLP this stay showed tot cholest 163, HDL 47, LDL 91, .   * Started on atorvastatin 40 mg daily     Volume overload dt diastolic CHF exacerbation: Improved  Essential hypertension  Hypokalemia, recurrent on lasix, and often refusing potassium   * Not on/needing meds  PTA.   * As above, echo this stay showed EF 50% with grade I diastolic dysfunction; RV not well visualized but appeared mild-moderately dilated with global systolic function probably mildly reduced.   * BPs were initially soft this stay and required IVFs. BPs improved.   * Developed pedal edema required IV Lasix. Ultimately transition to oral Lasix.  * Started on lisinopril this stay  * BPs improved during stay.   * Lasix decreased from 40mg in AM and 20mg in PM to 20mg BID on 9/3 and added spironolactone 25mg daily  Plan:  -- reduced lisinopril dose to 5mg daily with parameters on 9/14, will hold lisinopril completely for now as she needs diuretics  -- cont Lasix 20mg po BID  -- reduced spironolactone to 12.5mg daily with parameters on 9/14  -- cont KCl 20mEq po daily  -- f/u bp and adjust closely as indicated     Hypophosphatemia: Resolved  Hypokalemia     Refusing replacement    Encourage bananas     Prolonged QTc   * EKG on 7/13 showed QTc 494.   * Repeat EKG on 7/30 (while on Levaquin) showed QTc table at 475. Has since completed course of abx as below.  * Had been monitoring on telemetry this stay. No concerning findings so tele was ultimately dc'd 8/19     Chronic bilateral LE edema with chronic venous stasis dermatitis and chronic skin changes  Hx of LLE cellulitis  * Was hospitalized in 11/2021 for sepsis dt to pneumonia and LLE cellulitis.   * During this stay, BLE noted to be patty and edematous, no unilateral leg swelling appreciated; LLE had patchy areas of erythema, no fluctuance, no painful areas with palpation; legs warm to touch. Lymphedema consulted.   -- cont LE elevation, lymphedema wraps     Lower back/R flank wound/abscess, suspect dt pressure type injury, s/p surgical debridement on 7/30/22 and placement of wound vac on 8/2/22,  OA  Hx of bilateral hip replacements   Hx of chronic neck and back pain  * Pt with significant back pain early on in hospital stay. Was requiring IV  hydromorphone.  Dose had to be decreased dt somnolence.  * MRI L-spine in 2018 showed multilevel degenerative changes with mild central stenosis. Patient has chronic back pain, reports having it over the last 2 years.   * During this stay, patient was noted with 5-6cm by 2-3cm wound with swelling/fluid/blistering in a fold of her lower back, did not appear infected; this seemed to be the source of her pain. Padded dressing placed and WOC RN ordered. Pain initially improved.   * Was placed on course of Augmentin on 7/24.   * On 7/26, was re-evaluated by WOC RN. Wound appeared more erythematous and purulence expressed. Worsening with shear stress from lift. Procal <0.05, WBC WNL. Abx changed to IV clindamycin.  * Wound cultures obtained. On 7/28, wound grew proteus and staph aureus (MRSA neg). US neg for abscess.   * Lost IV access on 7/28 so abx were changed to oral clindamycin and oral levaquin. IV access re-established but was continued on oral abx.  * General surgery consulted, ultimately underwent excisional debridement of R flank abscess in OR on 7/30. Intraop cultures showed polymicrobial growth with proteus mirabilis x2 strains (both pan-sensitive), corynebacterium striatum and enterococcus faecalis.  * Wound vac placed per general surgery on 8/2 >> subsequently removed during stay.  * ID consulted on 8/2 and abx narrowed to Augmentin alone, completed an additional 5 days of treatment on 8/7.   -- wound per WOC RN, follows weekly  -- prns available for pain  -- cont regular repositioning     Pressure Injury, left Medial thigh/groin  Pressure Injury Stage: Deep Tissue Pressure Injury (DTPI), hospital acquired, now HAPI stage 2 pressure injury device related purwick external catheter tubing (not the purwick device itself)  - Wound care following     Pressure injury bilateral buttocks  * Noted by nursing on 09/02/22. WOC RN following as above.      Suspected meningioma left parietal region, incidentally seen on  admission CT on 7/13  * Head CT 7/13 showed a calcified extra-axial mass overlying the left parietal region measuring 1.4 cm, potentially representing a meningioma.  * Will need serial monitoring OP after discharge.      Anemia, suspect dilutional component: Resolved  * Hgb normal on admit. Hgb 11.5 on 7/16. No overt clinical signs of major bleeding.  * Hgb now stable at 12-13      ntertriginous dermatitis  * Chronic and stable, cont clotrimazole powder     Constipation  * Continue sched bowel regimen     Morbid obesity  * BMI 59 on admission. Recommend aggressive dietary and lifestyle modifications as condition improves     Suspected sleep apnea  * O2 drop to mid 80s overnight 8/14. Briefly placed on 4LPM, which she then removed and then slept okay with sats in 90s thereafter.  * Recommend sleep study as outpatient     Malnutrition:    - Level of malnutrition: Severe   Severe malnutrition. In Context of:  Acute illness or injury  Chronic illness or disease  Malnutrition: (8/31)   % Weight Loss:  > 5% in 1 month (severe malnutrition) - significant loss over this admit  % Intake:  </= 50% for >/= 5 days (severe malnutrition) - consistent this admit x49 days  Subcutaneous Fat Loss:  Orbital region moderate depletion - visual, per 8/16 note  Muscle Loss:  Temporal region moderate depletion - visual, per 8/16 note  Fluid Retention:  Mild generalized edema  - dietitian following, recommend alternative feeding source? Not clear she would tolerate a tube feed with her confusion, will monitor, perhaps consider some carb counting    10/15- stable, continue present care, try to replace K       Diet: Advance Diet as Tolerated  Regular Diet Adult Thin Liquids (level 0) (Upright position, alert, and assist as needed)  Room Service    DVT Prophylaxis: Pneumatic Compression Devices  [place] Catheter: Not present  Central Lines: None  Cardiac Monitoring: None  Code Status: No CPR- Do NOT Intubate      Disposition Plan      Expected  Discharge Date: 10/19/2022    Discharge Delays: Placement - LTC  *Medically Ready for Discharge    Discharge Comments: Multiple referrerals outl  Did receive her 2nd covid shot  LTC placement        The patient's care was discussed with the Patient/nurse    Donal Rodriguez MD  Hospitalist Service  [facility]  Securely message with the Vocera Web Console (learn more here)  Text page via [employer] Paging/Directory         Clinically Significant Risk Factors Present on Admission                      ______________________________________________________________________    Interval History   No complaints     Data reviewed today: I reviewed all medications, new labs and imaging results over the last 24 hours. I personally reviewed no images or EKG's today.    Physical Exam   Vital Signs: Temp: 97.5  F (36.4  C) Temp src: Oral BP: 109/71 Pulse: 80   Resp: 16 SpO2: 93 % O2 Device: None (Room air)    Weight: 278 lbs 9.6 oz  Constitutional: awake, alert, cooperative, no apparent distress  Neurologic: Awake, alert,   Neuropsychiatric: General: normal, calm and normal eye contact    Data   Recent Labs   Lab 10/15/22  0835 10/14/22  0742 10/13/22  1548 10/13/22  0837 10/12/22  0904 10/10/22  0759 10/09/22  2106 10/09/22  0817   WBC  --   --   --   --   --   --   --  6.1   HGB  --   --   --   --   --   --   --  14.7   MCV  --   --   --   --   --   --   --  88    270  --  262 276  --   --  272   POTASSIUM 3.1*  --  3.8  --  3.3*   < >  --  3.6   GLC  --   --   --   --   --   --  90  --     < > = values in this interval not displayed.     No results found for this or any previous visit (from the past 24 hour(s)).  Medications     - MEDICATION INSTRUCTIONS -         ARIPiprazole  10 mg Oral QPM     aspirin  325 mg Oral Daily     atorvastatin  40 mg Oral QPM     enoxaparin ANTICOAGULANT  40 mg Subcutaneous Q12H     furosemide  20 mg Oral BID     [Held by provider] lisinopril  5 mg Oral Daily      miconazole   Topical BID     multivitamin w/minerals  1 tablet Oral Daily     OLANZapine  2.5 mg Oral At Bedtime     polyethylene glycol  17 g Oral Daily     potassium chloride  20 mEq Oral Daily     senna-docusate  1-2 tablet Oral BID     sertraline  50 mg Oral Daily     sodium chloride (PF)  3 mL Intracatheter Q8H     spironolactone  12.5 mg Oral Daily

## 2022-10-15 NOTE — PROGRESS NOTES
Day RN gave report to writer to bridge the gap between day RN and evening RN as evening RN was running late.

## 2022-10-16 NOTE — PLAN OF CARE
Goal Outcome Evaluation:                      Summary: CVA R MCA.   DATE & TIME: 10/15/22-10/16/22 5721-4615  Cognitive Concerns/ Orientation : A&O x2,  disoriented to time and situation.  BEHAVIOR & AGGRESSION TOOL COLOR: Green  ABNL VS: VSS on RA.   MOBILITY: Up with assist of 2 with lata steady or lift, on pulsate mattress. Turn Q2hr. BLE elevated on pillows, refuses at times.   PAIN MANAGMENT: Denies  DIET: Regular. Needs encouragement to eat and drink, poor appetite   BOWEL/BLADDER: Incontinent, no BM this shift.    ABNL LAB/BG: k 3.1, has scheduled Potassium  DRAIN/DEVICES: LUE PIV SL   TELEMETRY RHYTHM: NA  SKIN: Mepilex dressing to R flank/back Blanchable redness to coccyx/buttocks, perineum . BLLE edema 2+, Redness in folds and under the breast Miconazole powder applied.  TESTS/PROCEDURES: None  D/C DATE: Pending LTC placement.  Discharge Barriers: Placement  OTHER IMPORTANT INFO: SW following. Had minimal vaginal bleeding.

## 2022-10-16 NOTE — PLAN OF CARE
Summary: CVA R MCA.   DATE & TIME: 10/16/22: 7239-9172  Cognitive Concerns/ Orientation : A&O x3,  disoriented to time.  BEHAVIOR & AGGRESSION TOOL COLOR: Green  ABNL VS: VS stable on room air.  MOBILITY: Up with assist of 2 with lift. On pulsate mattress. Turn Q2hr. BLE elevated on pillows.   PAIN MANAGMENT: C/o bilateral foot pain-managed with prn Oxycodone  DIET: Regular. Needs encouragement to eat and drink, poor appetite   BOWEL/BLADDER: Incontinent of B&B. Had BM x2 this shift  ABNL LAB/BG: K 3.5  DRAIN/DEVICES: LUE PIV SL   TELEMETRY RHYTHM: NA  SKIN: Mepilex dressing to R flank/back changed by day RN. Blanchable redness to coccyx/buttocks, perineum . BLE edema 2+, Redness in folds and under the breast Miconazole powder applied.  TESTS/PROCEDURES: None  D/C DATE: Pending LTC placement.  Discharge Barriers: Placement  OTHER IMPORTANT INFO: SW following. Continues to have minimal vaginal bleeding. Last Hgb 15.0.

## 2022-10-16 NOTE — PROGRESS NOTES
[facility]    Medicine Progress Note - Hospitalist Service    Date of Admission:  7/13/2022    Assessment & Plan        Cristy Bailey is a 77 year old female with a PMHx significant for morbid obesity, hypertension, and hyperlipidemia, who presented to [facility] 7/13/2022 with complete left-sided paralysis, left-sided facial droop, and difficulty speaking. CTA head remarkable for right MCA occlusion. She was given tenecteplase and subsequently transferred to [facility] 7/13/2022 for thrombectomy.   Hospital stay complicated by flank wound which required surgical debridement and placement of wound vac and findings LTC placement.     Acute R MCA ischemic stroke with edema and right to left midline shift  S/p tenecteplase and subsequent R MCA mechanical thrombectomy 7/13/22  * Presented to [facility] 7/13 with complete paralysis, left-sided facial droop, and aphasia. Code stroke initiated. Head CT 7/13 showed signs of an evolving R MCA infarct. CTA head 7/13 showed a right carotid terminus occlusion, right middle cerebral artery M1  segment occlusion with poor opacification of the more distal right MCA branches/poor collateral flow. CTA neck 7/13 negative for acute occlusions. Pt given tenecteplase 7/13 at 13:11. Noted to be agitated and was subsequently intubated given need for procedure.   * Subsequently transferred to [facility] 7/13/2022 where she underwent above procedure.   7/14: Extubated. Head CT 7/14 showed evolution of acute infarct involving the R MCA distribution with increased swelling, cortical effacement, and new mild right-to-left midline shift; questionable hypoattenuation involving the bilateral occipital lobes which could be artifactual.   * Additional stroke workup pursued this stay -- echo 7/14 showed EF 50%, grade 1 diastolic dysfunction   * Started ASA and atorvastatin per stroke team.   * Repeat head CT 7/15 showed evolving R MCA stroke with areas of edema,  overall stable.  Plan:  -- Continue full dose aspirin  -- Goal SBP <140/90 and met   -- Monitored on telemetry for first several wks of hospital stay without abnl rhythm so can likely defer prior recommendations for 30d cardiac event monitor at discharge  -- Will need to follow up with [facility] in 6-8 wks  -- Waiting on placement     Vaginal Bleeding  Assessment: vaginal bleeding last couple days. Pelvis US shows abnormal endometrial thickening to 14 mm. Hgb stable.   Plan:  - OB/GYN consulted -> recommend outpatient evaluation for endometrial sampling in the near future.     Severe malnutrition in context of acute illness and chronic disease  * Nutritionist following. Appetite poor this stay, needing encouragement to take po.   *there is some discussion about needing feeding support, but patient does not want to have a feeding tube.  * 70 lb weight loss in the last few months (likely lower in the context of diuresis for CHF and inherent error in measuring)  Plan  - encourage oral intake  - patient does not want a feeding tube at this point and her health care agent dulce maria agrees with this, consistent with previous wishes     Anxiety   Cognitive impairment, confusion as above.   * As stay has progressed, patient has consistently been oriented x2 (to self, location), unable to state the year. Also noted to have intermittent situational confusion. States family members have been visiting when they have not.   * Ongoing issues with anxiety this stay -- had been on regimen of Seroquel 12.5mg at dinner and 25mg HS with 12.5mg po q6h prn available. Mirtazapine 7.5mg on 8/15 evening given decreased appetite  * Psych consulted for assistance with ongoing mgmt -- seen on 8/16 and meds adjusted. Seroquel HS changed to Abilify 5mg HS. Recommended Zyptexa 5mg q6h prn for breakthrough insomnia/agitation while going off Seroquel. Consider uptitration of Abilify per psych recs. Advised uptitration of sertraline (25mg x3d then increase  to 50mg daily beginning 8/20) and Remeron stopped. Also advised to liberalize Ativan to 0.5mg q6h prn.    * Seen by psych on 8/24, Abilify increased from 5mg to 10mg.  * PRN ativan stopped on 08/27/22 as leading to increased confusion. Hydroxyzine stopped  as well.   -- cont current regimen of meds including Abilify 5mg every evening, Zyprexa 2.5mg HS and sertraline 50mg daily  -- cont prns per psych including Zyprexa 5mg q6h prn     Goals of care, failure to thrive  Discussed with patient and significant other/Health care agent Leo. She remains confused. He reiterated her wishes for DNR/DNI and no feeding tubes   We discussed concern about overall prognosis, with her eating 0-25% of her meals and nutrition recommending alternative forms of nutritions  She has lost 70 lbs since admit (some of that is probably related to diuresis and some related to variable weights in hospital, but still significant)  Overall, placement in a facility has been difficult, and there is concern about her continued deteriorating under these circumstances  Plan  - now DNR/DNI  - see discussion regarding weight loss above  Palliative care met with Leo 9/28- he did not want hospice- she is no code blue and does not want artificial nutrition      Urinary retention: Resolved  * Retaining urine on 8/14. UA WNL, not worrisome for infection  * Requiring straight cath x1 on 8/20 PM  * Good UOP, no longer requiring straight cath. No evidence of obstruction on PVR.      Dysphagia due to CVA: Resolved  * Seen by SLP and noted with dysphagia. Initially required some intermittent fluid boluses.   * Was eventually able to advance to regular diet w/thin liquids     Dyslipidemia  * FLP this stay showed tot cholest 163, HDL 47, LDL 91, .   * Started on atorvastatin 40 mg daily     Volume overload dt diastolic CHF exacerbation: Improved  Essential hypertension  Hypokalemia, recurrent on lasix, and often refusing potassium   * Not on/needing meds  PTA.   * As above, echo this stay showed EF 50% with grade I diastolic dysfunction; RV not well visualized but appeared mild-moderately dilated with global systolic function probably mildly reduced.   * BPs were initially soft this stay and required IVFs. BPs improved.   * Developed pedal edema required IV Lasix. Ultimately transition to oral Lasix.  * Started on lisinopril this stay  * BPs improved during stay.   * Lasix decreased from 40mg in AM and 20mg in PM to 20mg BID on 9/3 and added spironolactone 25mg daily  Plan:  -- reduced lisinopril dose to 5mg daily with parameters on 9/14, will hold lisinopril completely for now as she needs diuretics  -- cont Lasix 20mg po BID  -- reduced spironolactone to 12.5mg daily with parameters on 9/14  -- cont KCl 20mEq po daily  -- f/u bp and adjust closely as indicated     Hypophosphatemia: Resolved  Hypokalemia     Refusing replacement    Encourage bananas     Prolonged QTc   * EKG on 7/13 showed QTc 494.   * Repeat EKG on 7/30 (while on Levaquin) showed QTc table at 475. Has since completed course of abx as below.  * Had been monitoring on telemetry this stay. No concerning findings so tele was ultimately dc'd 8/19     Chronic bilateral LE edema with chronic venous stasis dermatitis and chronic skin changes  Hx of LLE cellulitis  * Was hospitalized in 11/2021 for sepsis dt to pneumonia and LLE cellulitis.   * During this stay, BLE noted to be patty and edematous, no unilateral leg swelling appreciated; LLE had patchy areas of erythema, no fluctuance, no painful areas with palpation; legs warm to touch. Lymphedema consulted.   -- cont LE elevation, lymphedema wraps     Lower back/R flank wound/abscess, suspect dt pressure type injury, s/p surgical debridement on 7/30/22 and placement of wound vac on 8/2/22,  OA  Hx of bilateral hip replacements   Hx of chronic neck and back pain  * Pt with significant back pain early on in hospital stay. Was requiring IV  hydromorphone.  Dose had to be decreased dt somnolence.  * MRI L-spine in 2018 showed multilevel degenerative changes with mild central stenosis. Patient has chronic back pain, reports having it over the last 2 years.   * During this stay, patient was noted with 5-6cm by 2-3cm wound with swelling/fluid/blistering in a fold of her lower back, did not appear infected; this seemed to be the source of her pain. Padded dressing placed and WOC RN ordered. Pain initially improved.   * Was placed on course of Augmentin on 7/24.   * On 7/26, was re-evaluated by WOC RN. Wound appeared more erythematous and purulence expressed. Worsening with shear stress from lift. Procal <0.05, WBC WNL. Abx changed to IV clindamycin.  * Wound cultures obtained. On 7/28, wound grew proteus and staph aureus (MRSA neg). US neg for abscess.   * Lost IV access on 7/28 so abx were changed to oral clindamycin and oral levaquin. IV access re-established but was continued on oral abx.  * General surgery consulted, ultimately underwent excisional debridement of R flank abscess in OR on 7/30. Intraop cultures showed polymicrobial growth with proteus mirabilis x2 strains (both pan-sensitive), corynebacterium striatum and enterococcus faecalis.  * Wound vac placed per general surgery on 8/2 >> subsequently removed during stay.  * ID consulted on 8/2 and abx narrowed to Augmentin alone, completed an additional 5 days of treatment on 8/7.   -- wound per WOC RN, follows weekly  -- prns available for pain  -- cont regular repositioning     Pressure Injury, left Medial thigh/groin  Pressure Injury Stage: Deep Tissue Pressure Injury (DTPI), hospital acquired, now HAPI stage 2 pressure injury device related purwick external catheter tubing (not the purwick device itself)  - Wound care following     Pressure injury bilateral buttocks  * Noted by nursing on 09/02/22. WOC RN following as above.      Suspected meningioma left parietal region, incidentally seen on  admission CT on 7/13  * Head CT 7/13 showed a calcified extra-axial mass overlying the left parietal region measuring 1.4 cm, potentially representing a meningioma.  * Will need serial monitoring OP after discharge.      Anemia, suspect dilutional component: Resolved  * Hgb normal on admit. Hgb 11.5 on 7/16. No overt clinical signs of major bleeding.  * Hgb now stable at 12-13      ntertriginous dermatitis  * Chronic and stable, cont clotrimazole powder     Constipation  * Continue sched bowel regimen     Morbid obesity  * BMI 59 on admission. Recommend aggressive dietary and lifestyle modifications as condition improves     Suspected sleep apnea  * O2 drop to mid 80s overnight 8/14. Briefly placed on 4LPM, which she then removed and then slept okay with sats in 90s thereafter.  * Recommend sleep study as outpatient     Malnutrition:    - Level of malnutrition: Severe   Severe malnutrition. In Context of:  Acute illness or injury  Chronic illness or disease  Malnutrition: (8/31)   % Weight Loss:  > 5% in 1 month (severe malnutrition) - significant loss over this admit  % Intake:  </= 50% for >/= 5 days (severe malnutrition) - consistent this admit x49 days  Subcutaneous Fat Loss:  Orbital region moderate depletion - visual, per 8/16 note  Muscle Loss:  Temporal region moderate depletion - visual, per 8/16 note  Fluid Retention:  Mild generalized edema  - dietitian following, recommend alternative feeding source? Not clear she would tolerate a tube feed with her confusion, will monitor, perhaps consider some carb counting    10/16- stable, labs reviewed.  K low as usual. Continue to replace as able.        Diet: Advance Diet as Tolerated  Regular Diet Adult Thin Liquids (level 0) (Upright position, alert, and assist as needed)  Room Service    DVT Prophylaxis: Pneumatic Compression Devices  [place] Catheter: Not present  Central Lines: None  Cardiac Monitoring: None  Code Status: No CPR- Do NOT Intubate       Disposition Plan      Expected Discharge Date: 10/19/2022    Discharge Delays: Placement - LTC  *Medically Ready for Discharge    Discharge Comments: Multiple referrerals outl  Did receive her 2nd covid shot  LTC placement        The patient's care was discussed with the Patient/nurse    Donal Rodriguez MD  Hospitalist Service  [facility]  Securely message with the Vocera Web Console (learn more here)  Text page via [employer] Paging/Directory         Clinically Significant Risk Factors Present on Admission                      ______________________________________________________________________    Interval History   No complaints     Data reviewed today: I reviewed all medications, new labs and imaging results over the last 24 hours. I personally reviewed no images or EKG's today.    Physical Exam   Vital Signs: Temp: 97.7  F (36.5  C) Temp src: Oral BP: 96/57 Pulse: 76   Resp: 18 SpO2: 95 % O2 Device: None (Room air)    Weight: 284 lbs 3.2 oz  Constitutional: awake, alert, cooperative, no apparent distress  Neurologic: Awake, alert,   Neuropsychiatric: General: normal, calm and normal eye contact    Data   Recent Labs   Lab 10/16/22  0933 10/15/22  1421 10/15/22  0835 10/14/22  0742 10/10/22  0759 10/09/22  2106   WBC 4.9  --   --   --   --   --    HGB 15.0  --   --   --   --   --    MCV 89  --   --   --   --   --      --  266 270   < >  --      --   --   --   --   --    POTASSIUM 3.2* 3.9 3.1*  --    < >  --    CHLORIDE 99  --   --   --   --   --    CO2 32  --   --   --   --   --    BUN 14  --   --   --   --   --    CR 0.79  --   --   --   --   --    ANIONGAP 6  --   --   --   --   --    ASHIA 8.8  --   --   --   --   --    *  --   --   --   --  90    < > = values in this interval not displayed.     No results found for this or any previous visit (from the past 24 hour(s)).  Medications     - MEDICATION INSTRUCTIONS -         ARIPiprazole  10 mg Oral QPM      aspirin  325 mg Oral Daily     atorvastatin  40 mg Oral QPM     enoxaparin ANTICOAGULANT  40 mg Subcutaneous Q12H     furosemide  20 mg Oral BID     [Held by provider] lisinopril  5 mg Oral Daily     miconazole   Topical BID     multivitamin w/minerals  1 tablet Oral Daily     OLANZapine  2.5 mg Oral At Bedtime     polyethylene glycol  17 g Oral Daily     potassium chloride  20 mEq Oral Daily     senna-docusate  1-2 tablet Oral BID     sertraline  50 mg Oral Daily     sodium chloride (PF)  3 mL Intracatheter Q8H     spironolactone  12.5 mg Oral Daily

## 2022-10-16 NOTE — PROGRESS NOTES
Summary: CVA R MCA.   DATE & TIME: 10/16/22: 0700-1500HRS  Cognitive Concerns/ Orientation : A&O x2,  disoriented to time and situation.  BEHAVIOR & AGGRESSION TOOL COLOR: Green  ABNL VS: Soft BP, other VSS on RA.  MOBILITY: Up with assist of 2 with lata steady or lift, on pulsate mattress. Turn Q2hr. BLE elevated on pillows, refuses at times.   PAIN MANAGMENT: Denies  DIET: Regular. Needs encouragement to eat and drink, poor appetite   BOWEL/BLADDER: Incontinent of B&B. Last BM on 10/15.   ABNL LAB/BG: k 3.2, replaced besides scheduled Potassium.   DRAIN/DEVICES: LUE PIV SL   TELEMETRY RHYTHM: NA  SKIN: Mepilex dressing to R flank/back, changed today,  Blanchable redness to coccyx/buttocks, perineum . BLE edema 2+, Redness in folds and under the breast Miconazole powder applied.  TESTS/PROCEDURES: Re-draw K at 1630hrs  D/C DATE: Pending LTC placement.  Discharge Barriers: Placement  OTHER IMPORTANT INFO: SW following. Continues to have minimal vaginal bleeding.

## 2022-10-17 NOTE — PROGRESS NOTES
[facility]    Medicine Progress Note - Hospitalist Service    Date of Admission:  7/13/2022    Assessment & Plan        Cristy Bailey is a 77 year old female with a PMHx significant for morbid obesity, hypertension, and hyperlipidemia, who presented to [facility] 7/13/2022 with complete left-sided paralysis, left-sided facial droop, and difficulty speaking. CTA head remarkable for right MCA occlusion. She was given tenecteplase and subsequently transferred to [facility] 7/13/2022 for thrombectomy.   Hospital stay complicated by flank wound which required surgical debridement and placement of wound vac and findings LTC placement.     Acute R MCA ischemic stroke with edema and right to left midline shift  S/p tenecteplase and subsequent R MCA mechanical thrombectomy 7/13/22  * Presented to [facility] 7/13 with complete paralysis, left-sided facial droop, and aphasia. Code stroke initiated. Head CT 7/13 showed signs of an evolving R MCA infarct. CTA head 7/13 showed a right carotid terminus occlusion, right middle cerebral artery M1  segment occlusion with poor opacification of the more distal right MCA branches/poor collateral flow. CTA neck 7/13 negative for acute occlusions. Pt given tenecteplase 7/13 at 13:11. Noted to be agitated and was subsequently intubated given need for procedure.   * Subsequently transferred to [facility] 7/13/2022 where she underwent above procedure.   7/14: Extubated. Head CT 7/14 showed evolution of acute infarct involving the R MCA distribution with increased swelling, cortical effacement, and new mild right-to-left midline shift; questionable hypoattenuation involving the bilateral occipital lobes which could be artifactual.   * Additional stroke workup pursued this stay -- echo 7/14 showed EF 50%, grade 1 diastolic dysfunction   * Started ASA and atorvastatin per stroke team.   * Repeat head CT 7/15 showed evolving R MCA stroke with areas of edema,  overall stable.  Plan:  -- Continue full dose aspirin  -- Goal SBP <140/90 and met   -- Monitored on telemetry for first several wks of hospital stay without abnl rhythm so can likely defer prior recommendations for 30d cardiac event monitor at discharge  -- Will need to follow up with [facility] in 6-8 wks  -- Waiting on placement     Vaginal Bleeding  Assessment: vaginal bleeding last couple days. Pelvis US shows abnormal endometrial thickening to 14 mm. Hgb stable.   Plan:  - OB/GYN consulted -> recommend outpatient evaluation for endometrial sampling in the near future.     Severe malnutrition in context of acute illness and chronic disease  * Nutritionist following. Appetite poor this stay, needing encouragement to take po.   *there is some discussion about needing feeding support, but patient does not want to have a feeding tube.  * 70 lb weight loss in the last few months (likely lower in the context of diuresis for CHF and inherent error in measuring)  Plan  - encourage oral intake  - patient does not want a feeding tube at this point and her health care agent dulce maria agrees with this, consistent with previous wishes     Anxiety   Cognitive impairment, confusion as above.   * As stay has progressed, patient has consistently been oriented x2 (to self, location), unable to state the year. Also noted to have intermittent situational confusion. States family members have been visiting when they have not.   * Ongoing issues with anxiety this stay -- had been on regimen of Seroquel 12.5mg at dinner and 25mg HS with 12.5mg po q6h prn available. Mirtazapine 7.5mg on 8/15 evening given decreased appetite  * Psych consulted for assistance with ongoing mgmt -- seen on 8/16 and meds adjusted. Seroquel HS changed to Abilify 5mg HS. Recommended Zyptexa 5mg q6h prn for breakthrough insomnia/agitation while going off Seroquel. Consider uptitration of Abilify per psych recs. Advised uptitration of sertraline (25mg x3d then increase  to 50mg daily beginning 8/20) and Remeron stopped. Also advised to liberalize Ativan to 0.5mg q6h prn.    * Seen by psych on 8/24, Abilify increased from 5mg to 10mg.  * PRN ativan stopped on 08/27/22 as leading to increased confusion. Hydroxyzine stopped  as well.   -- cont current regimen of meds including Abilify 5mg every evening, Zyprexa 2.5mg HS and sertraline 50mg daily  -- cont prns per psych including Zyprexa 5mg q6h prn     Goals of care, failure to thrive  Discussed with patient and significant other/Health care agent Leo. She remains confused. He reiterated her wishes for DNR/DNI and no feeding tubes   We discussed concern about overall prognosis, with her eating 0-25% of her meals and nutrition recommending alternative forms of nutritions  She has lost 70 lbs since admit (some of that is probably related to diuresis and some related to variable weights in hospital, but still significant)  Overall, placement in a facility has been difficult, and there is concern about her continued deteriorating under these circumstances  Plan  - now DNR/DNI  - see discussion regarding weight loss above  Palliative care met with Leo 9/28- he did not want hospice- she is no code blue and does not want artificial nutrition      Urinary retention: Resolved  * Retaining urine on 8/14. UA WNL, not worrisome for infection  * Requiring straight cath x1 on 8/20 PM  * Good UOP, no longer requiring straight cath. No evidence of obstruction on PVR.      Dysphagia due to CVA: Resolved  * Seen by SLP and noted with dysphagia. Initially required some intermittent fluid boluses.   * Was eventually able to advance to regular diet w/thin liquids     Dyslipidemia  * FLP this stay showed tot cholest 163, HDL 47, LDL 91, .   * Started on atorvastatin 40 mg daily     Volume overload dt diastolic CHF exacerbation: Improved  Essential hypertension  Hypokalemia, recurrent on lasix, and often refusing potassium   * Not on/needing meds  PTA.   * As above, echo this stay showed EF 50% with grade I diastolic dysfunction; RV not well visualized but appeared mild-moderately dilated with global systolic function probably mildly reduced.   * BPs were initially soft this stay and required IVFs. BPs improved.   * Developed pedal edema required IV Lasix. Ultimately transition to oral Lasix.  * Started on lisinopril this stay  * BPs improved during stay.   * Lasix decreased from 40mg in AM and 20mg in PM to 20mg BID on 9/3 and added spironolactone 25mg daily  Plan:  -- reduced lisinopril dose to 5mg daily with parameters on 9/14, will hold lisinopril completely for now as she needs diuretics  -- cont Lasix 20mg po BID  -- reduced spironolactone to 12.5mg daily with parameters on 9/14  -- cont KCl 20mEq po daily  -- f/u bp and adjust closely as indicated     Hypophosphatemia: Resolved  Hypokalemia     Refusing replacement    Encourage bananas     Prolonged QTc   * EKG on 7/13 showed QTc 494.   * Repeat EKG on 7/30 (while on Levaquin) showed QTc table at 475. Has since completed course of abx as below.  * Had been monitoring on telemetry this stay. No concerning findings so tele was ultimately dc'd 8/19     Chronic bilateral LE edema with chronic venous stasis dermatitis and chronic skin changes  Hx of LLE cellulitis  * Was hospitalized in 11/2021 for sepsis dt to pneumonia and LLE cellulitis.   * During this stay, BLE noted to be patty and edematous, no unilateral leg swelling appreciated; LLE had patchy areas of erythema, no fluctuance, no painful areas with palpation; legs warm to touch. Lymphedema consulted.   -- cont LE elevation, lymphedema wraps     Lower back/R flank wound/abscess, suspect dt pressure type injury, s/p surgical debridement on 7/30/22 and placement of wound vac on 8/2/22,  OA  Hx of bilateral hip replacements   Hx of chronic neck and back pain  * Pt with significant back pain early on in hospital stay. Was requiring IV  hydromorphone.  Dose had to be decreased dt somnolence.  * MRI L-spine in 2018 showed multilevel degenerative changes with mild central stenosis. Patient has chronic back pain, reports having it over the last 2 years.   * During this stay, patient was noted with 5-6cm by 2-3cm wound with swelling/fluid/blistering in a fold of her lower back, did not appear infected; this seemed to be the source of her pain. Padded dressing placed and WOC RN ordered. Pain initially improved.   * Was placed on course of Augmentin on 7/24.   * On 7/26, was re-evaluated by WOC RN. Wound appeared more erythematous and purulence expressed. Worsening with shear stress from lift. Procal <0.05, WBC WNL. Abx changed to IV clindamycin.  * Wound cultures obtained. On 7/28, wound grew proteus and staph aureus (MRSA neg). US neg for abscess.   * Lost IV access on 7/28 so abx were changed to oral clindamycin and oral levaquin. IV access re-established but was continued on oral abx.  * General surgery consulted, ultimately underwent excisional debridement of R flank abscess in OR on 7/30. Intraop cultures showed polymicrobial growth with proteus mirabilis x2 strains (both pan-sensitive), corynebacterium striatum and enterococcus faecalis.  * Wound vac placed per general surgery on 8/2 >> subsequently removed during stay.  * ID consulted on 8/2 and abx narrowed to Augmentin alone, completed an additional 5 days of treatment on 8/7.   -- wound per WOC RN, follows weekly  -- prns available for pain  -- cont regular repositioning     Pressure Injury, left Medial thigh/groin  Pressure Injury Stage: Deep Tissue Pressure Injury (DTPI), hospital acquired, now HAPI stage 2 pressure injury device related purwick external catheter tubing (not the purwick device itself)  - Wound care following     Pressure injury bilateral buttocks  * Noted by nursing on 09/02/22. WOC RN following as above.      Suspected meningioma left parietal region, incidentally seen on  admission CT on 7/13  * Head CT 7/13 showed a calcified extra-axial mass overlying the left parietal region measuring 1.4 cm, potentially representing a meningioma.  * Will need serial monitoring OP after discharge.      Anemia, suspect dilutional component: Resolved  * Hgb normal on admit. Hgb 11.5 on 7/16. No overt clinical signs of major bleeding.  * Hgb now stable at 12-13      ntertriginous dermatitis  * Chronic and stable, cont clotrimazole powder     Constipation  * Continue sched bowel regimen     Morbid obesity  * BMI 59 on admission. Recommend aggressive dietary and lifestyle modifications as condition improves     Suspected sleep apnea  * O2 drop to mid 80s overnight 8/14. Briefly placed on 4LPM, which she then removed and then slept okay with sats in 90s thereafter.  * Recommend sleep study as outpatient     Malnutrition:    - Level of malnutrition: Severe   Severe malnutrition. In Context of:  Acute illness or injury  Chronic illness or disease  Malnutrition: (8/31)   % Weight Loss:  > 5% in 1 month (severe malnutrition) - significant loss over this admit  % Intake:  </= 50% for >/= 5 days (severe malnutrition) - consistent this admit x49 days  Subcutaneous Fat Loss:  Orbital region moderate depletion - visual, per 8/16 note  Muscle Loss:  Temporal region moderate depletion - visual, per 8/16 note  Fluid Retention:  Mild generalized edema  - dietitian following, recommend alternative feeding source? Not clear she would tolerate a tube feed with her confusion, will monitor, perhaps consider some carb counting    10/17- stable, continue present care       Diet: Advance Diet as Tolerated  Regular Diet Adult Thin Liquids (level 0) (Upright position, alert, and assist as needed)  Room Service    DVT Prophylaxis: Pneumatic Compression Devices  [place] Catheter: Not present  Central Lines: None  Cardiac Monitoring: None  Code Status: No CPR- Do NOT Intubate      Disposition Plan      Expected Discharge Date:  10/25/2022    Discharge Delays: Placement - LTC  *Medically Ready for Discharge    Discharge Comments: Multiple referrerals outl  Did receive her 2nd covid shot  LTC placement        The patient's care was discussed with the Patient/nurse    Donal Rodriguez MD  Hospitalist Service  [facility]  Securely message with the Vocera Web Console (learn more here)  Text page via [facility] Paging/Directory         Clinically Significant Risk Factors Present on Admission                      ______________________________________________________________________    Interval History   No complaints     Data reviewed today: I reviewed all medications, new labs and imaging results over the last 24 hours. I personally reviewed no images or EKG's today.    Physical Exam   Vital Signs: Temp: 97.6  F (36.4  C) Temp src: Axillary BP: 98/62 Pulse: 72   Resp: 18 SpO2: 97 % O2 Device: None (Room air)    Weight: 284 lbs 3.2 oz  Constitutional: awake, alert, cooperative, no apparent distress  Neurologic: Awake, alert,   Neuropsychiatric: General: normal, calm and normal eye contact    Data   Recent Labs   Lab 10/17/22  0857 10/16/22  1548 10/16/22  0933 10/15/22  1421 10/15/22  0835   WBC  --   --  4.9  --   --    HGB  --   --  15.0  --   --    MCV  --   --  89  --   --      --  270  --  266   NA  --   --  137  --   --    POTASSIUM  --  3.5 3.2* 3.9 3.1*   CHLORIDE  --   --  99  --   --    CO2  --   --  32  --   --    BUN  --   --  14  --   --    CR  --   --  0.79  --   --    ANIONGAP  --   --  6  --   --    ASHIA  --   --  8.8  --   --    GLC  --   --  111*  --   --      No results found for this or any previous visit (from the past 24 hour(s)).  Medications     - MEDICATION INSTRUCTIONS -         ARIPiprazole  10 mg Oral QPM     aspirin  325 mg Oral Daily     atorvastatin  40 mg Oral QPM     enoxaparin ANTICOAGULANT  40 mg Subcutaneous Q12H     furosemide  20 mg Oral BID     [Held by provider] lisinopril   5 mg Oral Daily     miconazole   Topical BID     multivitamin w/minerals  1 tablet Oral Daily     OLANZapine  2.5 mg Oral At Bedtime     polyethylene glycol  17 g Oral Daily     potassium chloride  20 mEq Oral Daily     senna-docusate  1-2 tablet Oral BID     sertraline  50 mg Oral Daily     sodium chloride (PF)  3 mL Intracatheter Q8H     spironolactone  12.5 mg Oral Daily

## 2022-10-17 NOTE — PLAN OF CARE
Summary: Presents with Acute R MCA ischemic stroke. Now needs LTC placement.  DATE & TIME: 10/17/22 7-3 pm   Cognitive Concerns/ Orientation : A & O x2, disoriented to time and situation. Confused. Mainly cooperative. Refused to take 2 (Potassium and multivitamin) pills today and get in chair.   BEHAVIOR & AGGRESSION TOOL COLOR: Green  ABNL VS: VSS on RA.   MOBILITY: Up with assist of 2 with lata steady or lift, on pulsate mattress. Turn Q2hr. BLE elevated on pillows, refuses at times.   PAIN MANAGMENT: Ice packs applied to bilateral ankles.   DIET: Regular. Needs encouragement to eat and drink, poor appetite.    BOWEL/BLADDER: Incontinent, no BM this shift. Vaginal bleeding still present.   ABNL LAB/BG: K+ 3.6. Refused replacement pill. Recheck tomorrow.   DRAIN/DEVICES: PIV SL   TELEMETRY RHYTHM: NA  SKIN: Pale. Dry and flaky. Mepilex dressing to R flank/back, changed. Blanchable redness to coccyx/buttocks/perineum, no openings noted. BLE edema 2+, patty calves, wound cares completed. Redness in folds, arm pits, and under the breasts Miconazole powder and Triad paste applied.  TESTS/PROCEDURES: None  D/C DATE: Pending LTC placement.  Discharge Barriers: Placement  OTHER IMPORTANT INFO: SW following. No changes. Sleeping in between cares. Not eating much. Stable. No changes.     3-7 pm : Pt started yelling help me every 15 minutes, after multiple attempts of reassurance was unsuccessful PRN Zyprexa x1 and was effective. Still poor appetite. And refused to get in chair.

## 2022-10-17 NOTE — PROGRESS NOTES
Care Management Follow Up    Length of Stay (days): 96    Expected Discharge Date: 10/25/2022     Concerns to be Addressed:       Patient plan of care discussed at interdisciplinary rounds: Yes    Anticipated Discharge Disposition: Skilled Nursing Facility     Anticipated Discharge Services:    Anticipated Discharge DME:      Patient/family educated on Medicare website which has current facility and service quality ratings:    Education Provided on the Discharge Plan:    Patient/Family in Agreement with the Plan: yes    Referrals Placed by CM/SW:    Private pay costs discussed:     Additional Information:  [facility] is assessing patient. Clinical information faxed today.      POORNIMA Sanderson

## 2022-10-17 NOTE — PLAN OF CARE
Goal Outcome Evaluation:                      Summary: CVA R MCA.   DATE & TIME: 10/16/22-10/17/22 0232-0728  Cognitive Concerns/ Orientation : A&O x2,  disoriented to time and situation.  BEHAVIOR & AGGRESSION TOOL COLOR: Green  ABNL VS: VSS on RA.   MOBILITY: Up with assist of 2 with lata steady or lift, on pulsate mattress. Turn Q2hr. BLE elevated on pillows, refuses at times.   PAIN MANAGMENT: PRN Oxycodone given x1  DIET: Regular. Needs encouragement to eat and drink, poor appetite   BOWEL/BLADDER: Incontinent, no BM this shift.    ABNL LAB/BG: K 3.5, has scheduled Potassium  DRAIN/DEVICES: LUE PIV SL   TELEMETRY RHYTHM: NA  SKIN: Mepilex dressing to R flank/back Blanchable redness to coccyx/buttocks, perineum . BLLE edema 2+, Redness in folds and under the breast Miconazole powder applied.  TESTS/PROCEDURES: None  D/C DATE: Pending LTC placement.  Discharge Barriers: Placement  OTHER IMPORTANT INFO: SW following. Had minimal vaginal bleeding.

## 2022-10-18 NOTE — PLAN OF CARE
Summary: Presents with Acute R MCA ischemic stroke. Now needs LTC placement.  DATE & TIME: 10/18/22 Day shift  Cognitive Concerns/ Orientation : A & O x2,  disoriented to time and situation. Confused.   BEHAVIOR & AGGRESSION TOOL COLOR: Green  ABNL VS: VSS on RA.   MOBILITY: Up with assist of 2 with lata steady or lift, on pulsate mattress. Turn Q2hr. BLE elevated on pillows; up in chair x1  PAIN MANAGMENT: Denies  DIET: Regular. Needs encouragement to eat and drink, poor appetite.    BOWEL/BLADDER: Incontinent at times, no BM this shift. Up to bedside commode or using the bedpan. Moderate vaginal bleeding this shift.   ABNL LAB/BG: K+ 3.0, being replaced  DRAIN/DEVICES: PIV SL   TELEMETRY RHYTHM: NA  SKIN: Pale. Dry and flaky. Mepilex dressing to R flank/back, CDI. Blanchable redness to coccyx/buttocks/perineum, no openings noted. BLE edema 2+, patty calves, BLE elevated on pillows. Redness in folds, arm pits, and under the breasts scheduled Miconazole powder and PRN  TESTS/PROCEDURES: None  D/C DATE: Pending LTC placement.  Discharge Barriers: Placement  OTHER IMPORTANT INFO: SW following.

## 2022-10-18 NOTE — PLAN OF CARE
Summary: Presents with Acute R MCA ischemic stroke. Now needs LTC placement.  DATE & TIME: 10/18/22 [id number]  Cognitive Concerns/ Orientation : A & O x2,  disoriented to time and situation. Confused.   BEHAVIOR & AGGRESSION TOOL COLOR: Green  ABNL VS: VSS on RA.   MOBILITY: Up with assist of 2 with lata steady or lift, on pulsate mattress. Turn Q2hr. BLE elevated on pillows.  PAIN MANAGMENT: Denies  DIET: Regular. Needs encouragement to eat and drink, poor appetite.    BOWEL/BLADDER: Incontinent at times, no BM this shift. Up to bedside commode or using the bedpan. Moderate vaginal bleeding this shift.   ABNL LAB/BG: None  DRAIN/DEVICES: PIV SL   TELEMETRY RHYTHM: NA  SKIN: Pale. Dry and flaky. Mepilex dressing to R flank/back, CDI. Blanchable redness to coccyx/buttocks/perineum, no openings noted. BLE edema 2+, patty calves, BLE elevated on pillows. Redness in folds, arm pits, and under the breasts scheduled Miconazole powder.  TESTS/PROCEDURES: None  D/C DATE: Pending LTC placement.  Discharge Barriers: Placement  OTHER IMPORTANT INFO: SW following.

## 2022-10-18 NOTE — PROGRESS NOTES
CLINICAL NUTRITION SERVICES - REASSESSMENT NOTE    RECOMMENDATIONS FOR MD/PROVIDER TO ORDER: - Max nutrition interventions reached. Pt continues to decline supplements, has declined nutrition support as well.    Recommendations Ordered by Registered Dietitian (RD):   - Continue room service assistance and thera vit M daily  - Encourage intakes    Malnutrition:  (9/21)  % Weight Loss:  > 5% in 1 month (severe malnutrition) - significant loss over this admit  % Intake:  </= 50% for >/= 5 days (severe malnutrition) - consistent this admit x70 days  Subcutaneous Fat Loss:  Orbital region moderate depletion - visual, per 8/16 note  Muscle Loss:  Temporal region moderate depletion - visual, per 8/16 note  Fluid Retention:  Mild generalized edema     Malnutrition Diagnosis: Severe malnutrition  In Context of:  Acute illness or injury  Chronic illness or disease     EVALUATION OF PROGRESS TOWARD GOALS   Diet: Regular Diet + Thin liquids  - no supplements, pt declines offers of supplements consistently.     Intake/Tolerance:  - no substantial change and/or improvement to oral intakes observed. Consistently eats about 25%, sometimes up to 50, or 75%. Generally speaking meals are very small to begin with.   - Pt is seen daily for room service, which is helpful to ensure that she orders with each meal period.   - She has consistently declined all supplements, and is not agreeable to nutrition support. She is getting a daily multivitamin.     - Meds:   Lasix BID  Thera vit M - typically refuses but took it today   Miralax (given), Senokot (held)  Potassium Chloride     ASSESSED NUTRITION NEEDS:  Dosing Weight: 89.7 kg (adjusted)  Estimated Energy Needs: 4947-9255+ kcals (15-20+ Kcal/Kg)  Justification: maintenance r/t BMI, Wounds  Estimated Protein Needs: 108-135 grams protein (1.2-1.5 g pro/Kg)  Justification: wound healing, obesity guidelines, preservation of lean body mass and increased needs r/t age, Wounds     NEW FINDINGS:    - Placement pending  - WOCN: 10/12:   Right lower back / waist crease - improving but hypertrophic (initially a stage 3 PI)  Left medial thigh/groin - HAPI stage 2 - healed wound base     Previous Goals:   Patient will improve intake to 50% meals BID   Evaluation: Not met    Previous Nutrition Diagnosis:   Inadequate oral intake related to poor appetite, disinterest in eating, and increased protein needs for wound healing as evidenced by 0-50% of meals consistently over 83 day admission  Evaluation: No change    CURRENT NUTRITION DIAGNOSIS  Inadequate oral intake related to poor appetite, disinterest in eating, and increased protein needs for wound healing as evidenced by 0-50% of meals consistently over 97 day admission    INTERVENTIONS  Recommendations / Nutrition Prescription  Regular diet  Encourage PO intakes  Will re-assess q 10 days.     Implementation  None new - provided general encouragement     Goals  Intake of at least 50% of meals BID.     MONITORING AND EVALUATION:  Progress towards goals will be monitored and evaluated per protocol and Practice Guidelines    Nicole Mitchell RD, LD  [facility], [facility]  Pager: [phone]  Weekend Pager: [phone]

## 2022-10-18 NOTE — PLAN OF CARE
Goal Outcome Evaluation:       DATE & TIME: 10/17/2022   Cognitive Concerns/ Orientation : forgetful  BEHAVIOR & AGGRESSION TOOL COLOR: Green  CIWA SCORE: NA  ABNL VS/O2: RA  MOBILITY: up with 2 lift  PAIN MANAGMENT: Denies pain  DIET: Regular with aspiration precaution  BOWEL/BLADDER: incontinent  ABNL LAB/BG: NA  DRAIN/DEVICES: Daily wound care  TELEMETRY RHYTHM: NA  SKIN: scattered bruising wound  TESTS/PROCEDURES: NA  D/C DATE: TBD  Discharge Barriers: waiting placement  OTHER IMPORTANT INFO:  Patient A&O X2 total care wiith daily wound care. Turn and repo Q2 hrs VSS on RA denies pain. Pt yell for help occasionally needs reminder to use call light. Plan to discharge pending for placement.

## 2022-10-18 NOTE — PROGRESS NOTES
[facility]    Medicine Progress Note - Hospitalist Service    Date of Admission:  7/13/2022    Assessment & Plan        Cristy Bailey is a 77 year old female with a PMHx significant for morbid obesity, hypertension, and hyperlipidemia, who presented to [facility] 7/13/2022 with complete left-sided paralysis, left-sided facial droop, and difficulty speaking. CTA head remarkable for right MCA occlusion. She was given tenecteplase and subsequently transferred to [facility] 7/13/2022 for thrombectomy.   Hospital stay complicated by flank wound which required surgical debridement and placement of wound vac and findings LTC placement.     Acute R MCA ischemic stroke with edema and right to left midline shift  S/p tenecteplase and subsequent R MCA mechanical thrombectomy 7/13/22  * Presented to [facility] 7/13 with complete paralysis, left-sided facial droop, and aphasia. Code stroke initiated. Head CT 7/13 showed signs of an evolving R MCA infarct. CTA head 7/13 showed a right carotid terminus occlusion, right middle cerebral artery M1  segment occlusion with poor opacification of the more distal right MCA branches/poor collateral flow. CTA neck 7/13 negative for acute occlusions. Pt given tenecteplase 7/13 at 13:11. Noted to be agitated and was subsequently intubated given need for procedure.   * Subsequently transferred to [facility] 7/13/2022 where she underwent above procedure.   7/14: Extubated. Head CT 7/14 showed evolution of acute infarct involving the R MCA distribution with increased swelling, cortical effacement, and new mild right-to-left midline shift; questionable hypoattenuation involving the bilateral occipital lobes which could be artifactual.   * Additional stroke workup pursued this stay -- echo 7/14 showed EF 50%, grade 1 diastolic dysfunction   * Started ASA and atorvastatin per stroke team.   * Repeat head CT 7/15 showed evolving R MCA stroke with areas of edema,  overall stable.  Plan:  -- Continue full dose aspirin  -- Goal SBP <140/90 and met   -- Monitored on telemetry for first several wks of hospital stay without abnl rhythm so will not order 30d cardiac event monitor at discharge per neurology recommendations from earlier this day.  -- Will need to follow up with [facility] in 6-8 wks  -- Waiting on placement     Vaginal Bleeding  Assessment: vaginal bleeding last couple days. Pelvis US shows abnormal endometrial thickening to 14 mm. Hgb stable.   Plan:  - OB/GYN consulted -> recommend outpatient evaluation for endometrial sampling in the near future.     Severe malnutrition in context of acute illness and chronic disease  * Nutritionist following. Appetite poor this stay, needing encouragement to take po.   *there is some discussion about needing feeding support, but patient does not want to have a feeding tube.  * 70 lb weight loss in the last few months (likely lower in the context of diuresis for CHF and inherent error in measuring)  Plan  - encourage oral intake  - patient does not want a feeding tube at this point and her health care agent dulce maria agrees with this, consistent with previous wishes     Anxiety   Cognitive impairment, confusion as above.   * As stay has progressed, patient has consistently been oriented x2 (to self, location), unable to state the year. Also noted to have intermittent situational confusion. States family members have been visiting when they have not.   * Ongoing issues with anxiety this stay -- had been on regimen of Seroquel 12.5mg at dinner and 25mg HS with 12.5mg po q6h prn available. Mirtazapine 7.5mg on 8/15 evening given decreased appetite  * Psych consulted for assistance with ongoing mgmt -- seen on 8/16 and meds adjusted. Seroquel HS changed to Abilify 5mg HS. Recommended Zyptexa 5mg q6h prn for breakthrough insomnia/agitation while going off Seroquel. Consider uptitration of Abilify per psych recs. Advised uptitration of sertraline  (25mg x3d then increase to 50mg daily beginning 8/20) and Remeron stopped. Also advised to liberalize Ativan to 0.5mg q6h prn.    * Seen by psych on 8/24, Abilify increased from 5mg to 10mg.  * PRN ativan stopped on 08/27/22 as leading to increased confusion. Hydroxyzine stopped  as well.   -- cont current regimen of meds including Abilify 5mg every evening, Zyprexa 2.5mg HS and sertraline 50mg daily  -- cont prns per psych including Zyprexa 5mg q6h prn     Goals of care, failure to thrive  Discussed with patient and significant other/Health care agent Leo. She remains confused. He reiterated her wishes for DNR/DNI and no feeding tubes   We discussed concern about overall prognosis, with her eating 0-25% of her meals and nutrition recommending alternative forms of nutritions  She has lost 70 lbs since admit (some of that is probably related to diuresis and some related to variable weights in hospital, but still significant)  Overall, placement in a facility has been difficult, and there is concern about her continued deteriorating under these circumstances  Plan  - now DNR/DNI  - see discussion regarding weight loss above  Palliative care met with Leo 9/28- he did not want hospice- she is no code blue and does not want artificial nutrition      Urinary retention: Resolved  * Retaining urine on 8/14. UA WNL, not worrisome for infection  * Requiring straight cath x1 on 8/20 PM  * Good UOP, no longer requiring straight cath. No evidence of obstruction on PVR.      Dysphagia due to CVA: Resolved  * Seen by SLP and noted with dysphagia. Initially required some intermittent fluid boluses.   * Was eventually able to advance to regular diet w/thin liquids     Dyslipidemia  * FLP this stay showed tot cholest 163, HDL 47, LDL 91, .   * Started on atorvastatin 40 mg daily     Volume overload dt diastolic CHF exacerbation: Improved  Essential hypertension  Hypokalemia, recurrent on lasix, and often refusing potassium    * Not on/needing meds PTA.   * As above, echo this stay showed EF 50% with grade I diastolic dysfunction; RV not well visualized but appeared mild-moderately dilated with global systolic function probably mildly reduced.   * BPs were initially soft this stay and required IVFs. BPs improved.   * Developed pedal edema required IV Lasix. Ultimately transition to oral Lasix.  * Started on lisinopril this stay  * BPs improved during stay.   * Lasix decreased from 40mg in AM and 20mg in PM to 20mg BID on 9/3 and added spironolactone 25mg daily  Plan:  -- reduced lisinopril dose to 5mg daily with parameters on 9/14, will hold lisinopril completely for now as she needs diuretics  -- cont Lasix 20mg po BID  -- reduced spironolactone to 12.5mg daily with parameters on 9/14  -- cont KCl 20mEq po daily  -- f/u bp and adjust closely as indicated     Hypophosphatemia: Resolved  Hypokalemia     Refusing replacement    Encourage bananas     Prolonged QTc   * EKG on 7/13 showed QTc 494.   * Repeat EKG on 7/30 (while on Levaquin) showed QTc table at 475. Has since completed course of abx as below.  * Had been monitoring on telemetry this stay. No concerning findings so tele was ultimately dc'd 8/19     Chronic bilateral LE edema with chronic venous stasis dermatitis and chronic skin changes  Hx of LLE cellulitis  * Was hospitalized in 11/2021 for sepsis dt to pneumonia and LLE cellulitis.   * During this stay, BLE noted to be patty and edematous, no unilateral leg swelling appreciated; LLE had patchy areas of erythema, no fluctuance, no painful areas with palpation; legs warm to touch. Lymphedema consulted.   -- cont LE elevation, lymphedema wraps     Lower back/R flank wound/abscess, suspect dt pressure type injury, s/p surgical debridement on 7/30/22 and placement of wound vac on 8/2/22,  OA  Hx of bilateral hip replacements   Hx of chronic neck and back pain  * Pt with significant back pain early on in hospital stay. Was  requiring IV hydromorphone.  Dose had to be decreased dt somnolence.  * MRI L-spine in 2018 showed multilevel degenerative changes with mild central stenosis. Patient has chronic back pain, reports having it over the last 2 years.   * During this stay, patient was noted with 5-6cm by 2-3cm wound with swelling/fluid/blistering in a fold of her lower back, did not appear infected; this seemed to be the source of her pain. Padded dressing placed and WOC RN ordered. Pain initially improved.   * Was placed on course of Augmentin on 7/24.   * On 7/26, was re-evaluated by WOC RN. Wound appeared more erythematous and purulence expressed. Worsening with shear stress from lift. Procal <0.05, WBC WNL. Abx changed to IV clindamycin.  * Wound cultures obtained. On 7/28, wound grew proteus and staph aureus (MRSA neg). US neg for abscess.   * Lost IV access on 7/28 so abx were changed to oral clindamycin and oral levaquin. IV access re-established but was continued on oral abx.  * General surgery consulted, ultimately underwent excisional debridement of R flank abscess in OR on 7/30. Intraop cultures showed polymicrobial growth with proteus mirabilis x2 strains (both pan-sensitive), corynebacterium striatum and enterococcus faecalis.  * Wound vac placed per general surgery on 8/2 >> subsequently removed during stay.  * ID consulted on 8/2 and abx narrowed to Augmentin alone, completed an additional 5 days of treatment on 8/7.   -- wound per WOC RN, follows weekly  -- prns available for pain  -- cont regular repositioning     Pressure Injury, left Medial thigh/groin  Pressure Injury Stage: Deep Tissue Pressure Injury (DTPI), hospital acquired, now HAPI stage 2 pressure injury device related purwick external catheter tubing (not the purwick device itself)  - Wound care following     Pressure injury bilateral buttocks  * Noted by nursing on 09/02/22. WOC RN following as above.      Suspected meningioma left parietal region,  incidentally seen on admission CT on 7/13  * Head CT 7/13 showed a calcified extra-axial mass overlying the left parietal region measuring 1.4 cm, potentially representing a meningioma.  * Will need serial monitoring OP after discharge.      Anemia, suspect dilutional component: Resolved  * Hgb normal on admit. Hgb 11.5 on 7/16. No overt clinical signs of major bleeding.  * Hgb now stable at 12-13      ntertriginous dermatitis  * Chronic and stable, cont clotrimazole powder     Constipation  * Continue sched bowel regimen     Morbid obesity  * BMI 59 on admission. Recommend aggressive dietary and lifestyle modifications as condition improves     Suspected sleep apnea  * O2 drop to mid 80s overnight 8/14. Briefly placed on 4LPM, which she then removed and then slept okay with sats in 90s thereafter.  * Recommend sleep study as outpatient     Malnutrition:    - Level of malnutrition: Severe   Severe malnutrition. In Context of:  Acute illness or injury  Chronic illness or disease  Malnutrition: (8/31)   % Weight Loss:  > 5% in 1 month (severe malnutrition) - significant loss over this admit  % Intake:  </= 50% for >/= 5 days (severe malnutrition) - consistent this admit x49 days  Subcutaneous Fat Loss:  Orbital region moderate depletion - visual, per 8/16 note  Muscle Loss:  Temporal region moderate depletion - visual, per 8/16 note  Fluid Retention:  Mild generalized edema  -  nutrition consulted and following.  Patient declined feeding tube.    10/17- stable, continue present care       Diet: Advance Diet as Tolerated  Regular Diet Adult Thin Liquids (level 0) (Upright position, alert, and assist as needed)  Room Service    DVT Prophylaxis: Pneumatic Compression Devices  [place] Catheter: Not present  Central Lines: None  Cardiac Monitoring: None  Code Status: No CPR- Do NOT Intubate      Disposition Plan      Expected Discharge Date: 10/25/2022    Discharge Delays: Placement - LTC  *Medically Ready for Discharge     Discharge Comments: Multiple referrerals out  Did receive her 2nd covid shot  LTC placement        The patient's care was discussed with the Patient/nurse    Jo White MD  Hospitalist Service  [facility]  Securely message with the Vocera Web Console (learn more here)  Text page via [employer] Paging/Directory         Clinically Significant Risk Factors Present on Admission                      ______________________________________________________________________    Interval History   Stable overnight no new issues.  She complained of pain in her lower extremities.  Wanted to be repositioned.    Data reviewed today: I reviewed all medications, new labs and imaging results over the last 24 hours. I personally reviewed no images or EKG's today.    Physical Exam   Vital Signs: Temp: 98.1  F (36.7  C) Temp src: Oral BP: 116/80 Pulse: 69   Resp: 16 SpO2: 97 % O2 Device: None (Room air)    Weight: 284 lbs 3.2 oz  Constitutional: awake, alert, cooperative, no apparent distress  Neurologic: Awake, alert,   Neuropsychiatric: General: normal, calm and normal eye contact, answering simple questions, moving both arms, able to wiggle both feet.    Data   Recent Labs   Lab 10/18/22  0939 10/17/22  0857 10/16/22  1548 10/16/22  0933   WBC  --   --   --  4.9   HGB  --   --   --  15.0   MCV  --   --   --  89    255  --  270   NA  --   --   --  137   POTASSIUM 3.0* 3.6 3.5 3.2*   CHLORIDE  --   --   --  99   CO2  --   --   --  32   BUN  --   --   --  14   CR  --   --   --  0.79   ANIONGAP  --   --   --  6   ASHIA  --   --   --  8.8   GLC  --   --   --  111*     No results found for this or any previous visit (from the past 24 hour(s)).  Medications     - MEDICATION INSTRUCTIONS -         ARIPiprazole  10 mg Oral QPM     aspirin  325 mg Oral Daily     atorvastatin  40 mg Oral QPM     enoxaparin ANTICOAGULANT  40 mg Subcutaneous Q12H     furosemide  20 mg Oral BID     [Held by provider] lisinopril  5  mg Oral Daily     miconazole   Topical BID     multivitamin w/minerals  1 tablet Oral Daily     OLANZapine  2.5 mg Oral At Bedtime     polyethylene glycol  17 g Oral Daily     potassium chloride  20 mEq Oral Once     potassium chloride  20 mEq Oral Daily     potassium chloride  40 mEq Oral Once     senna-docusate  1-2 tablet Oral BID     sertraline  50 mg Oral Daily     sodium chloride (PF)  3 mL Intracatheter Q8H     spironolactone  12.5 mg Oral Daily

## 2022-10-19 NOTE — PLAN OF CARE
Summary: Presents with Acute R MCA ischemic stroke. Now needs LTC placement.  DATE & TIME: 10/19/22 Day shift  Cognitive Concerns/ Orientation: A&Ox2,  disoriented to time and situation. Confused.   BEHAVIOR & AGGRESSION TOOL COLOR: Green  ABNL VS: VSS on RA.   MOBILITY: Up with assist of 2 with lata steady or lift, on pulsate mattress. Turn Q2hr. BLE elevated on pillows a must  PAIN MANAGMENT: Declined  DIET: Regular. Needs encouragement to eat and drink, poor appetite. Likes hot chocolate and ginger ale   BOWEL/BLADDER: Incontinent at times, no BM this shift. Can get up to bedside commode or uses the bedpan. Small vaginal bleeding this shift.   ABNL LAB/BG: K+ 3.9  DRAIN/DEVICES: PIV SL   TELEMETRY RHYTHM: NA  SKIN: Pale. Dry and flaky. Mepilex dressing to R flank/back, changed and CDI. Blanchable redness to coccyx/buttocks/perineum, no openings noted. BLE edema 2+, patty calves, BLE elevated on pillows. Redness and excoriation in abdominal folds, arm pits, and under the breasts-scheduled Miconazole powder and PRN  TESTS/PROCEDURES: None  D/C DATE: Pending LTC placement.  Discharge Barriers: Placement and improvement  OTHER IMPORTANT INFO: TONY following.

## 2022-10-19 NOTE — PROGRESS NOTES
[facility]    Medicine Progress Note - Hospitalist Service    Date of Admission:  7/13/2022    Assessment & Plan        Cristy Bailey is a 77 year old female with a PMHx significant for morbid obesity, hypertension, and hyperlipidemia, who presented to [facility] 7/13/2022 with complete left-sided paralysis, left-sided facial droop, and difficulty speaking. CTA head remarkable for right MCA occlusion. She was given tenecteplase and subsequently transferred to [facility] 7/13/2022 for thrombectomy.   Hospital stay complicated by flank wound which required surgical debridement and placement of wound vac and findings LTC placement.     Acute R MCA ischemic stroke with edema and right to left midline shift  S/p tenecteplase and subsequent R MCA mechanical thrombectomy 7/13/22  * Presented to [facility] 7/13 with complete paralysis, left-sided facial droop, and aphasia. Code stroke initiated. Head CT 7/13 showed signs of an evolving R MCA infarct. CTA head 7/13 showed a right carotid terminus occlusion, right middle cerebral artery M1  segment occlusion with poor opacification of the more distal right MCA branches/poor collateral flow. CTA neck 7/13 negative for acute occlusions. Pt given tenecteplase 7/13 at 13:11. Noted to be agitated and was subsequently intubated given need for procedure.   * Subsequently transferred to [facility] 7/13/2022 where she underwent above procedure.   7/14: Extubated. Head CT 7/14 showed evolution of acute infarct involving the R MCA distribution with increased swelling, cortical effacement, and new mild right-to-left midline shift; questionable hypoattenuation involving the bilateral occipital lobes which could be artifactual.   * Additional stroke workup pursued this stay -- echo 7/14 showed EF 50%, grade 1 diastolic dysfunction   * Started ASA and atorvastatin per stroke team.   * Repeat head CT 7/15 showed evolving R MCA stroke with areas of edema,  overall stable.  Plan:  -- Continue full dose aspirin  -- Goal SBP <140/90 and met   -- Monitored on telemetry for first several wks of hospital stay without abnl rhythm so will not order 30d cardiac event monitor at discharge per neurology recommendations from earlier this day.  -- Will need to follow up with [facility] in 6-8 wks  -- Waiting on placement     Vaginal Bleeding  Assessment: vaginal bleeding intermittently. Pelvis US shows abnormal endometrial thickening to 14 mm. Hgb stable.   Plan:  - OB/GYN consulted -> recommend outpatient evaluation for endometrial sampling in the near future.     Severe malnutrition in context of acute illness and chronic disease  * Nutritionist following. Appetite poor this stay, needing encouragement to take po.   *there is some discussion about needing feeding support, but patient does not want to have a feeding tube.  * 70 lb weight loss in the last few months (likely lower in the context of diuresis for CHF and inherent error in measuring)  Plan  - encourage oral intake  - patient does not want a feeding tube at this point and her health care agent dulce maria agrees with this, consistent with previous wishes     Anxiety   Cognitive impairment, confusion as above.   * As stay has progressed, patient has consistently been oriented x2 (to self, location), unable to state the year. Also noted to have intermittent situational confusion. States family members have been visiting when they have not.   * Ongoing issues with anxiety this stay -- had been on regimen of Seroquel 12.5mg at dinner and 25mg HS with 12.5mg po q6h prn available. Mirtazapine 7.5mg on 8/15 evening given decreased appetite  * Psych consulted for assistance with ongoing mgmt -- seen on 8/16 and meds adjusted. Seroquel HS changed to Abilify 5mg HS. Recommended Zyptexa 5mg q6h prn for breakthrough insomnia/agitation while going off Seroquel. Consider uptitration of Abilify per psych recs. Advised uptitration of sertraline  (25mg x3d then increase to 50mg daily beginning 8/20) and Remeron stopped. Also advised to liberalize Ativan to 0.5mg q6h prn.    * Seen by psych on 8/24, Abilify increased from 5mg to 10mg.  * PRN ativan stopped on 08/27/22 as leading to increased confusion. Hydroxyzine stopped  as well.   -- cont current regimen of meds including Abilify 5mg every evening, Zyprexa 2.5mg HS and sertraline 50mg daily  -- cont prns per psych including Zyprexa 5mg q6h prn     Goals of care, failure to thrive  Discussed with patient and significant other/Health care agent Leo. She remains confused. He reiterated her wishes for DNR/DNI and no feeding tubes   We discussed concern about overall prognosis, with her eating 0-25% of her meals and nutrition recommending alternative forms of nutritions  She has lost 70 lbs since admit (some of that is probably related to diuresis and some related to variable weights in hospital, but still significant)  Overall, placement in a facility has been difficult, and there is concern about her continued deteriorating under these circumstances  Plan  - now DNR/DNI  - see discussion regarding weight loss above  Palliative care met with Leo 9/28- he did not want hospice- she is no code blue and does not want artificial nutrition      Urinary retention: Resolved  * Retaining urine on 8/14. UA WNL, not worrisome for infection  * Requiring straight cath x1 on 8/20 PM  * Good UOP, no longer requiring straight cath. No evidence of obstruction on PVR.      Dysphagia due to CVA: Resolved  * Seen by SLP and noted with dysphagia. Initially required some intermittent fluid boluses.   * Was eventually able to advance to regular diet w/thin liquids     Dyslipidemia  * FLP this stay showed tot cholest 163, HDL 47, LDL 91, .   * Started on atorvastatin 40 mg daily     Volume overload dt diastolic CHF exacerbation: Improved  Essential hypertension  Hypokalemia, recurrent on lasix, and often refusing potassium    * Not on/needing meds PTA.   * As above, echo this stay showed EF 50% with grade I diastolic dysfunction; RV not well visualized but appeared mild-moderately dilated with global systolic function probably mildly reduced.   * BPs were initially soft this stay and required IVFs. BPs improved.   * Developed pedal edema required IV Lasix. Ultimately transition to oral Lasix.  * Started on lisinopril this stay  * BPs improved during stay.   * Lasix decreased from 40mg in AM and 20mg in PM to 20mg BID on 9/3 and added spironolactone 25mg daily  Plan:  -- reduced lisinopril dose to 5mg daily with parameters on 9/14, will hold lisinopril completely for now as she needs diuretics  -- cont Lasix 20mg po BID  -- reduced spironolactone to 12.5mg daily with parameters on 9/14  -- cont KCl 20mEq po daily  -- f/u bp and adjust closely as indicated     Hypophosphatemia: Resolved  Hypokalemia     Refusing replacement    Encourage bananas     Prolonged QTc   * EKG on 7/13 showed QTc 494.   * Repeat EKG on 7/30 (while on Levaquin) showed QTc table at 475. Has since completed course of abx as below.  * Had been monitoring on telemetry this stay. No concerning findings so tele was ultimately dc'd 8/19     Chronic bilateral LE edema with chronic venous stasis dermatitis and chronic skin changes  Hx of LLE cellulitis  * Was hospitalized in 11/2021 for sepsis dt to pneumonia and LLE cellulitis.   * During this stay, BLE noted to be patty and edematous, no unilateral leg swelling appreciated; LLE had patchy areas of erythema, no fluctuance, no painful areas with palpation; legs warm to touch. Lymphedema consulted.   -- cont LE elevation, lymphedema wraps     Lower back/R flank wound/abscess, suspect dt pressure type injury, s/p surgical debridement on 7/30/22 and placement of wound vac on 8/2/22,  OA  Hx of bilateral hip replacements   Hx of chronic neck and back pain  * Pt with significant back pain early on in hospital stay. Was  requiring IV hydromorphone.  Dose had to be decreased dt somnolence.  * MRI L-spine in 2018 showed multilevel degenerative changes with mild central stenosis. Patient has chronic back pain, reports having it over the last 2 years.   * During this stay, patient was noted with 5-6cm by 2-3cm wound with swelling/fluid/blistering in a fold of her lower back, did not appear infected; this seemed to be the source of her pain. Padded dressing placed and WOC RN ordered. Pain initially improved.   * Was placed on course of Augmentin on 7/24.   * On 7/26, was re-evaluated by WOC RN. Wound appeared more erythematous and purulence expressed. Worsening with shear stress from lift. Procal <0.05, WBC WNL. Abx changed to IV clindamycin.  * Wound cultures obtained. On 7/28, wound grew proteus and staph aureus (MRSA neg). US neg for abscess.   * Lost IV access on 7/28 so abx were changed to oral clindamycin and oral levaquin. IV access re-established but was continued on oral abx.  * General surgery consulted, ultimately underwent excisional debridement of R flank abscess in OR on 7/30. Intraop cultures showed polymicrobial growth with proteus mirabilis x2 strains (both pan-sensitive), corynebacterium striatum and enterococcus faecalis.  * Wound vac placed per general surgery on 8/2 >> subsequently removed during stay.  * ID consulted on 8/2 and abx narrowed to Augmentin alone, completed an additional 5 days of treatment on 8/7.   -- wound per WOC RN, follows weekly  -- prns available for pain  -- cont regular repositioning     Pressure Injury, left Medial thigh/groin  Pressure Injury Stage: Deep Tissue Pressure Injury (DTPI), hospital acquired, now HAPI stage 2 pressure injury device related purwick external catheter tubing (not the purwick device itself)  - Wound care following     Pressure injury bilateral buttocks  * Noted by nursing on 09/02/22. WOC RN following as above.      Suspected meningioma left parietal region,  incidentally seen on admission CT on 7/13  * Head CT 7/13 showed a calcified extra-axial mass overlying the left parietal region measuring 1.4 cm, potentially representing a meningioma.  * Will need serial monitoring OP after discharge.      Anemia, suspect dilutional component: Resolved  * Hgb normal on admit. Hgb 11.5 on 7/16. No overt clinical signs of major bleeding.  * Hgb now stable at 12-13      ntertriginous dermatitis  * Chronic and stable, cont clotrimazole powder     Constipation  * Continue sched bowel regimen     Morbid obesity  * BMI 59 on admission. Recommend aggressive dietary and lifestyle modifications as condition improves     Suspected sleep apnea  * O2 drop to mid 80s overnight 8/14. Briefly placed on 4LPM, which she then removed and then slept okay with sats in 90s thereafter.  * Recommend sleep study as outpatient     Malnutrition:    - Level of malnutrition: Severe   Severe malnutrition. In Context of:  Acute illness or injury  Chronic illness or disease  Malnutrition: (8/31)   % Weight Loss:  > 5% in 1 month (severe malnutrition) - significant loss over this admit  % Intake:  </= 50% for >/= 5 days (severe malnutrition) - consistent this admit x49 days  Subcutaneous Fat Loss:  Orbital region moderate depletion - visual, per 8/16 note  Muscle Loss:  Temporal region moderate depletion - visual, per 8/16 note  Fluid Retention:  Mild generalized edema  -  nutrition consulted and following.  Patient declined feeding tube.    10/17- stable, continue present care       Diet: Advance Diet as Tolerated  Regular Diet Adult Thin Liquids (level 0) (Upright position, alert, and assist as needed)  Room Service    DVT Prophylaxis: Pneumatic Compression Devices  [place] Catheter: Not present  Central Lines: None  Cardiac Monitoring: None  Code Status: No CPR- Do NOT Intubate      Disposition Plan      Expected Discharge Date: 10/25/2022    Discharge Delays: Placement - LTC  *Medically Ready for Discharge     Discharge Comments: Multiple referrerals out  Did receive her 2nd covid shot  LTC placement        The patient's care was discussed with the Patient/nurse    Jo White MD  Hospitalist Service  [facility]  Securely message with the Vocera Web Console (learn more here)  Text page via [employer] Paging/Directory         Clinically Significant Risk Factors Present on Admission                      ______________________________________________________________________    Interval History   Stable overnight no new issues.  RN brought up concern that patient still has intermittent vaginal bleeding which has been noted in her chart.  Noted plans for follow-up with GYN as outpatient.  Hemoglobin checked 3 days ago was stable.  Patient was laying in bed when seen.  She seemed tearful and requested different attendannts noting that the current people who helped her reposition were mean to her because she was unable to follow the directions.  Communicated this concern to RN.    Data reviewed today: I reviewed all medications, new labs and imaging results over the last 24 hours. I personally reviewed no images or EKG's today.    Physical Exam   Vital Signs: Temp: 97.2  F (36.2  C) Temp src: Oral BP: 112/67 Pulse: 77   Resp: 18 SpO2: 95 % O2 Device: None (Room air)    Weight: 267 lbs 10.22 oz  Constitutional: awake, alert, cooperative, no apparent distress, a bit tearful today [see above]  Neurologic: Awake, alert,   Neuropsychiatric: General: normal, calm and normal eye contact, answering simple questions, moving both arms, able to wiggle both feet.    Data   Recent Labs   Lab 10/19/22  0828 10/19/22  0024 10/18/22  0939 10/17/22  0857 10/16/22  1548 10/16/22  0933   WBC  --   --   --   --   --  4.9   HGB  --   --   --   --   --  15.0   MCV  --   --   --   --   --  89     --  275 255  --  270   NA  --   --   --   --   --  137   POTASSIUM  --  3.9 3.0* 3.6   < > 3.2*   CHLORIDE  --   --   --    --   --  99   CO2  --   --   --   --   --  32   BUN  --   --   --   --   --  14   CR  --   --   --   --   --  0.79   ANIONGAP  --   --   --   --   --  6   ASHIA  --   --   --   --   --  8.8   GLC  --   --   --   --   --  111*    < > = values in this interval not displayed.     No results found for this or any previous visit (from the past 24 hour(s)).  Medications     - MEDICATION INSTRUCTIONS -         ARIPiprazole  10 mg Oral QPM     aspirin  325 mg Oral Daily     atorvastatin  40 mg Oral QPM     enoxaparin ANTICOAGULANT  40 mg Subcutaneous Q12H     furosemide  20 mg Oral BID     [Held by provider] lisinopril  5 mg Oral Daily     miconazole   Topical BID     multivitamin w/minerals  1 tablet Oral Daily     OLANZapine  2.5 mg Oral At Bedtime     polyethylene glycol  17 g Oral Daily     potassium chloride  20 mEq Oral Daily     senna-docusate  1-2 tablet Oral BID     sertraline  50 mg Oral Daily     sodium chloride (PF)  3 mL Intracatheter Q8H     spironolactone  12.5 mg Oral Daily

## 2022-10-19 NOTE — PROGRESS NOTES
Care Management Follow Up    Length of Stay (days): 98    Expected Discharge Date: 10/25/2022     Concerns to be Addressed:       Patient plan of care discussed at interdisciplinary rounds: Yes    Anticipated Discharge Disposition: Skilled Nursing Facility     Anticipated Discharge Services:    Anticipated Discharge DME:      Patient/family educated on Medicare website which has current facility and service quality ratings:    Education Provided on the Discharge Plan:    Patient/Family in Agreement with the Plan: yes    Referrals Placed by CM/SW:    Private pay costs discussed:     Additional Information:  Referral process for SNF continues.  Patient is also being assessed by [facility] which is an enhanced [facility] which has nurses on duty whom can provide daily wound care.       Keri Stone, VADIMSW

## 2022-10-19 NOTE — PROGRESS NOTES
"[facility] Nurse Inpatient Assessment     Consulted for: Right lower back wound (Stage 3 HAPI)     Previous left medial thigh wound (stage 2) is healed    Areas Assessed:      Areas visualized during today's visit: right skin fold, left medial thigh      Wound location: Right lower back in waist crease    Photo date: 10-19-22        10-12-22      Wound due to: Hospital Acquired Pressure injury stage 3   Stage: Unstageable 7/28, Following surgical debridement 7/30 Stage 3 Pressure Injury and Moisture Associated Skin Damage (MASD), suspect intertriginous pressure, appears to be deeper tissue damage within a crease, possible shear component from lift.    Wound history/plan of care: previously a large deep wound, vac therapy was used for a few weeks and wound filled in well. Now at skin level and appears somewhat hypertrophic and friable. Will trial Mesalt ribbon.   Wound base: red moist non granular tissue, slick and friable      Palpation of the wound bed: normal       Drainage: moderate      Description of drainage: serosanguinous       Measurements (length x width x depth, in cm) 1.2 x 9.5 x 0.2cm      Tunneling N/A      Undermining NA  Periwound skin:  Area of erythema/rash 2 x 3.5 x 0cm superior to wound improving slowly; mild erythema and irritation remain in general      Color: red blanchable erythema      Temperature: normal to warm and sweaty  Odor: none    Pain: tender   Pain intervention: slow and gentle cares  Treatment goal: Heal   STATUS: improving but hypertrophic  Supplies ordered: at bedside     Treatment Plan:     Right posterior waist crease: Daily  1. Remove old dressing, moistening with saline or wound cleanser to help release if needed.   2. Cleanse wound and periwound skin with Vashe ( #[id number]) solution and 4x4\" gauze, pat dry.  3. Apply 3M No Sting barrier wipe to periwound skin, let dry.  4. Cut a length of Mesalt ribbon (# [id number]) and apply to wound bed, " 1-2 layers.  Keep the leftover Mesalt in a specimen cup to keep it clean and to find it easily.   5. Cover with Mepilex, conforming carefully to skin contours.  6. Time/Date/Initial dressing change.      Orders: Reviewed and Updated    RECOMMEND PRIMARY TEAM ORDER: None, at this time  Education provided: importance of repositioning, plan of care, wound progress, Moisture management and Off-loading pressure  Discussed plan of care with: Patient and Nurse  WOC nurse follow-up plan: weekly  Notify WOC if wound(s) deteriorate.  Nursing to notify the Provider(s) and re-consult the WOC Nurse if new skin concern.    DATA:     Current support surface: Bariatric Low air loss mattress    Containment of urine/stool: Incontinence Protocol and Incontinent pad in bed  BMI: Body mass index is 43.2 kg/m .   Active diet order: Orders Placed This Encounter      Advance Diet as Tolerated      Regular Diet Adult Thin Liquids (level 0) (Upright position, alert, and assist as needed)     Output: No intake/output data recorded.     Labs:   Recent Labs   Lab 10/16/22  0933   HGB 15.0   WBC 4.9     Pressure injury risk assessment:   Sensory Perception: 3-->slightly limited  Moisture: 2-->very moist  Activity: 2-->chairfast  Mobility: 2-->very limited  Nutrition: 2-->probably inadequate  Friction and Shear: 1-->problem  Demetrio Score: 12    Talia Linda RN CWOCN   Dept. Pager: [phone]  Dept. Office Number: [phone]

## 2022-10-19 NOTE — PLAN OF CARE
Goal Outcome Evaluation:                      Summary: Presents with Acute R MCA ischemic stroke. Now needs LTC placement.  DATE & TIME: 10/18/22-10/19/22 [id number]  Cognitive Concerns/ Orientation: A&Ox2,  disoriented to time and situation. Confused.   BEHAVIOR & AGGRESSION TOOL COLOR: Green  ABNL VS: VSS on RA.   MOBILITY: Up with assist of 2 with lata steady or lift, on pulsate mattress. Turn Q2hr. BLE elevated on pillows  PAIN MANAGMENT: PRN oxycodone given x1  DIET: Regular. Needs encouragement to eat and drink, poor appetite.    BOWEL/BLADDER: Incontinent at times, no BM this shift. Up to bedside commode or using the bedpan. Moderate vaginal bleeding this shift.   ABNL LAB/BG: K+ 3.9, reckeck in AM  DRAIN/DEVICES: PIV SL   TELEMETRY RHYTHM: NA  SKIN: Pale. Dry and flaky. Mepilex dressing to R flank/back, CDI. Blanchable redness to coccyx/buttocks/perineum, no openings noted. BLE edema 2+, patty calves, BLE elevated on pillows. Redness in folds, arm pits, and under the breasts scheduled Miconazole powder and PRN  TESTS/PROCEDURES: None  D/C DATE: Pending LTC placement.  Discharge Barriers: Placement  OTHER IMPORTANT INFO: TONY following.

## 2022-10-20 NOTE — PLAN OF CARE
Physical Therapy    Writer familiar with pt from earlier in her hospitalization stay. Pt has been trialed for physical therapy for 90+days, and unfortunately has not made any progress in >30days.     Status quo: Pt was previously stand pivot to her wc at baseline. Pt very fearful of falling and perseverates during sessions about her fear, exhibits severe fear of standing and movement. Difficult to keep focused and on task. Patient performs supine-sit with mod assist, sit to stands with lata steady device and mod-max assist x2, quickly sits back down. Unable to progress past this point in therapy.     Writer spoke with pt, her significant other, and SW. Plan is to find long term care for patient with wound care, appears very appropriate. Nursing recommendations: please use behzad Ax2 for all bed<>chair transfers, have pt transfer 2-3x daily as able to tolerate.     Physical therapy will sign off, please re-order if status changes.     Sharmaine Manjarrez, PT, DPT

## 2022-10-20 NOTE — PLAN OF CARE
Goal Outcome Evaluation:    Summary: Presents with Acute R MCA ischemic stroke. Now needs LTC placement.  DATE & TIME: 10/19-10/20/22 nights   Cognitive Concerns/ Orientation: A&Ox2,  disoriented to time and situation. Confused.   BEHAVIOR & AGGRESSION TOOL COLOR: Green  ABNL VS: VSS on RA.   MOBILITY: Up with assist of 2 with lata steady or lift, on pulsate mattress. Turn Q2hr. BLE elevated on pillows  PAIN MANAGMENT: Declined  DIET: Regular. Needs encouragement to eat and drink, poor appetite. Likes hot chocolate and ginger ale  BOWEL/BLADDER: Incontinent at times, no BM this shift. Can get up to bedside commode or uses the bedpan.   ABNL LAB/BG: K+ 3.9  DRAIN/DEVICES: PIV SL   TELEMETRY RHYTHM: NA  SKIN: Pale. Dry and flaky. Mepilex dressing to R flank/back, CDI. Blanchable redness to coccyx/buttocks/perineum, no openings noted. BLE edema 2+, patty calves, BLE elevated on pillows. Redness and excoriation in abdominal folds, arm pits, and under the breasts-scheduled Miconazole powder and PRN  TESTS/PROCEDURES: None  D/C DATE: Pending LTC placement.  Discharge Barriers: Placement and improvement  OTHER IMPORTANT INFO: Pad and brief saturated with blood, cleaned and changed. Patient calm and resting in bed. Turned and repositioned, incontinent care done. SW following.

## 2022-10-20 NOTE — PLAN OF CARE
Summary: Presents with Acute R MCA ischemic stroke. Now needs LTC placement.  DATE & TIME: 10/20/22 Day shift  Cognitive Concerns/ Orientation: A&Ox2,  disoriented to time and situation. Confused.   BEHAVIOR & AGGRESSION TOOL COLOR: Green  ABNL VS: VSS on RA.   MOBILITY: Up with assist of 2 with lata steady or lift, on pulsate mattress. Turn Q2hr. BLE elevated on pillows; up to chair x1  PAIN MANAGMENT: Complained of BLE pain, gave hot packs with relief. Declined pain medications.  DIET: Regular. Needs encouragement to eat and drink, poor appetite. Likes hot chocolate and ginger ale  BOWEL/BLADDER: Incontinent at times, no BM this shift. Can get up to bedside commode or uses the bedpan.   ABNL LAB/BG: NA  DRAIN/DEVICES: PIV SL- L arm  TELEMETRY RHYTHM: NA  SKIN: Pale. Dry and flaky. Mepilex dressing to R flank/back, changed and CDI. Blanchable redness to coccyx/buttocks/perineum, no openings noted. BLE edema 2+, patty calves, BLE elevated on pillows. Redness and excoriation in abdominal folds, arm pits, and under the breasts-scheduled Miconazole powder and PRN  TESTS/PROCEDURES: None  D/C DATE: Pending LTC placement.  Discharge Barriers: Placement and improvement  OTHER IMPORTANT INFO: Still having vaginal bleeding.Turned and repositioned, incontinent care done. SW following.

## 2022-10-20 NOTE — PROGRESS NOTES
[facility]    Internal Medicine Hospitalist Progress Note  10/20/2022  I evaluated patient on the above date.    Shaka Rose Jr., MD  [phone] (p)  Text Page  Vocera        Assessment & Plan New actions/orders today (10/20/2022) are underlined.    Cristy Bailey is a 77 year old female with a PMHx significant for morbid obesity; HTN; and HLD; who presented to [facility] 7/13/2022 with complete left-sided paralysis, left-sided facial droop, and difficulty speaking. CTA head remarkable for right MCA occlusion. She was given tenecteplase and subsequently transferred to [facility] 7/13/2022 for thrombectomy.     Hospital stay complicated by development and progression of flank wound.  This subsequently required surgical debridement and placement of wound vac (8/2) that was subsequently removed (8/22).     Prolonged hospitalization related todifficulties finding LTC placement.      Acute R MCA ischemic stroke with edema and right to left midline shift - s/p tenecteplase and subsequent R MCA mechanical thrombectomy 7/13/22  * Presented to [facility] 7/13 with complete paralysis, left-sided facial droop, and aphasia. Code stroke initiated. Head CT 7/13 showed signs of an evolving R MCA infarct. CTA head 7/13 showed a right carotid terminus occlusion, right middle cerebral artery M1 segment occlusion with poor opacification of the more distal right MCA branches/poor collateral flow. CTA neck 7/13 negative for acute occlusions. Pt given tenecteplase 7/13 at 13:11. Noted to be agitated and was subsequently intubated given need for procedure.   * Subsequently transferred to [facility] 7/13/2022 where she underwent above procedure.   * Extubated 7/14. Head CT 7/14 showed evolution of acute infarct involving the R MCA distribution with increased swelling, cortical effacement, and new mild right-to-left midline shift; questionable hypoattenuation involving the bilateral occipital lobes  which could be artifactual.  Echo 7/14 showed LVEF 50%, grade 1 diastolic dysfunction.   * Started ASA and atorvastatin per stroke team.   * Repeat head CT 7/15 showed evolving R MCA stroke with areas of edema, overall stable.  -- Continue  mg daily, atorvastatin 40 mg daily.  -- Goal SBP <140/90 and met   -- Monitored on telemetry for first several wks of hospital stay without abnl rhythm so will not order 30d cardiac event monitor at discharge per neurology recommendations (from 10/18).  -- Follow up with [facility] in 6-8 wks    Anxiety  Cognitive impairment with intermittent situational confusion  Metabolic encephalopathy, resolved  * On no psychiatric medications PTA.  * Pt with issues with anxiety and intermittent situational confusion this hospitalization.    * Seen by Psychiatry this hospitalization.  * Started on multiple medications this hospitalization including quetiapine, olanzapine, sertraline, mirtazapine, PRN hydroxyzine, PRN lorazepam.  * Medication regimen was adjusted/titrated. Mirtazapine, PRN lorazepam and PRN hydroxyzine subsequently stopped. See previous progress notes for more details.  * PRN ativan stopped on 08/27/22 as leading to increased confusion. Hydroxyzine stopped  as well.   -- Continue aripiprazole 10 mg qpm; olanzapine 2.5 mg at bedtime; sertraline 50 mg daily.  -- Continue PRN olanzapine 5 mg q6h.     Lower back/R flank wound/abscess, suspect dt pressure type injury - s/p surgical debridement on 7/30/2022 and placement of wound vac on 8/2/2022 (subsequently removed 8/22/2022).  OA  Hx of bilateral hip replacements  Hx of chronic neck and back pain  *  Patient has chronic back pain, reports having it over the last 2 years. MRI L-spine in 2018 showed multilevel degenerative changes with mild central stenosis.  * Pt with significant back pain early on in hospital stay. Was requiring IV hydromorphone.  Dose had to be decreased dt somnolence.  * During this stay, patient was noted  with 5-6cm by 2-3cm wound with swelling/fluid/blistering in a fold of her lower back, did not appear infected; this seemed to be the source of her pain. Padded dressing placed and WOC RN ordered. Pain initially improved.   * Was started on a course of amoxicillin-clavulanate on 7/24.   * On 7/26, was re-evaluated by WOC RN. Wound appeared more erythematous and purulence expressed. Worsening with shear stress from lift. Procal <0.05, WBC WNL. Abx changed to IV clindamycin.  * Wound cultures obtained. On 7/28, wound grew proteus and Staph aureus (MRSA neg). US neg for abscess.   * Lost IV access on 7/28 so abx were changed to oral clindamycin and oral levaquin. IV access re-established but was continued on oral abx.  * General surgery consulted, ultimately underwent excisional debridement of R flank abscess in OR on 7/30. Intraop cultures showed polymicrobial growth with proteus mirabilis x2 strains (both pan-sensitive), corynebacterium striatum and enterococcus faecalis.  * Wound vac placed per general surgery on 8/2.  * ID consulted on 8/2 and abx narrowed to amoxicillin-clavulanate alone, completed an additional 5 days of treatment on 8/7.   * Wound vac removed 8/22.  -- Continue local wound cares per WOC RN, follows weekly  -- Continue PRN oxycodone; minimize opioids as able.  -- Continue regular repositioning.  -- Management of other pressure injuries as below.    Volume overload secondary to acute diastolic CHF exacerbation, resolved  Essential hypertension  * Not on/needing meds PTA.   * As above, echo this stay showed EF 50% with grade I diastolic dysfunction; RV not well visualized but appeared mild-moderately dilated with global systolic function probably mildly reduced.   * BPs were initially soft this stay and required IVFs. BPs improved.   * Subsequently developed volume overload with elevated BP's and diuresed with IV furosemide, subsequently transitioned to PO furosemide.  * Issues with recurrent  hypokalemia on furosemide; noted often refusion potassium.  * Started on lisinopril, but subsequently stopped due to soft BP's on furosemide.  * Furosemide increased and spironolactone added 9/3.  -- Continue furosemide 20 mg BID; spironolactone 12.5 mg daily.  -- Monitor i/o's, daily wts.    Severe malnutrition related to acute and chronic medical issues  Significant weight loss this hospitalization  * Nutritionist following. Appetite poor this stay, needing encouragement to take po.   * There was some discussion previously about needing feeding support, but noted that patient does not want to have a feeding tube.  * As of 10/19, has had 107 lb weight loss since admit (374 lbs on 7/14; 267 lbs 10/19); noted some contribution from diuresis for CHF.  -- Continue to encourage oral intake  -- Continue supplements.  -- Patient does not want a feeding tube and her health care agent Leo agrees with this, consistent with previous wishes     Vaginal bleeding  Abnormal endometrial thickening on US  * Pt with vaginal bleeding intermittently this hospitalization.  * Pelvic US 10/8 showed abnormal endometrial thickening to 14 mm.   * Gyn consulted 10/9 and recommend outpatient evaluation.  * Hgb overall stable.  -- Follow-up with Gyn outpatient for endometrial sampling in the near future.    Deep tissue pressure injury (DTPI) left medial thigh/groin region, hospital acquired, progressed to HAPI stage 2 pressure injury; related to purwick external catheter tubing (not the purwick device itself)  Pressure injuries, bilateral buttocks  * DTPI left medial thigh/groin region noted this hospitalization. WOC RN consulted.   * Pressure injuries in bilateral buttocks noted 9/2. Seen by WOC RN.  -- Continue local wound cares per WOC RN.  -- Continue regular repositioning.    Dyslipidemia  * FLP this stay showed tot cholest 163, HDL 47, LDL 91, .   * Started on atorvastatin 40 mg daily  -- Continue atorvastatin 40 mg  daily.     Chronic bilateral LE edema with chronic venous stasis dermatitis and chronic skin changes  Hx of LLE cellulitis  * Was hospitalized in 11/2021 for sepsis dt to pneumonia and LLE cellulitis.   * During this stay, BLE noted to be patty and edematous, no unilateral leg swelling appreciated; LLE had patchy areas of erythema, no fluctuance, no painful areas with palpation; legs warm to touch. Lymphedema consulted.   -- Cont LE elevation, lymphedema wraps.  -- Continue diuretic as above.     Hypokalemia, hypophosphatemia.  * Potassium and phosphorus low at times this hospitalization. Potassium low related to diuretics. Treated with replacement.  - Continue PRN K and phos replacement.    Intertriginous dermatitis  * Chronic and stable.  -- Continue clotrimazole powder     Constipation  -- Continue scheduled senna-docusate, polyethylene glycol, PRN bisacodyl.    Suspected meningioma left parietal region, incidentally seen on admission CT on 7/13/2022  * Head CT 7/13 showed a calcified extra-axial mass overlying the left parietal region measuring 1.4 cm, potentially representing a meningioma.  -- Will need serial monitoring OP after discharge.      Suspected sleep apnea  * O2 drop to mid 80s overnight 8/14. Briefly placed on 4LPM, which she then removed and then slept okay with sats in 90s thereafter.  -- Consider sleep study as outpatient     Morbid obesity with significant weight loss this hospitalization  * BMI 59 on admission.  * Significant weight loss this hospitalization as above (107 lbs as of 10/19). BMI 43.2 on 10/20.  -- Continue aggressive dietary and lifestyle modifications.     Goals of care  Failure to thrive  * During this hospitalization, discussed concern about overall prognosis, with her eating 0-25% of her meals and nutrition recommendations for alternative forms of nutrition; at that time, she had lost 70 lbs since admit (some of that was probably related to diuresis and some related to  variable weights in hospital, but still significant).   * Patient and significant other/HCA Leo, reiterated her wishes for DNR/DNI and no feeding tubes.  * Palliative care met with Leo 9/28 and he did not want hospice.  -- Continue restorative cares.  -- Pt is DNR/DNI, no tube feedings.    DISPOSITION  -- Plan LTC placement once facility available.      OTHER RESOLVED/CHRONIC ISSUES:  Urinary retention, resolved  * Retaining urine on 8/14. UA WNL, not worrisome for infection.  * Requiring straight cath x1 on 8/20 PM.  * Subsequently good UOP, no longer requiring straight cath. No evidence of obstruction on PVR.      Dysphagia due to CVA, resolved  * Seen by SLP and noted with dysphagia. Initially required some intermittent fluid boluses.   * Was eventually able to advance to regular diet w/thin liquids.    Prolonged QTc.  * EKG on 7/13 showed QTc 494.   * Repeat EKG on 7/30 (while on levofloxacin) showed QTc stable at 475. Has since completed course of abx as below.  * Had been monitoring on telemetry this stay. No concerning findings so tele was ultimately dc'd 8/19.    Anemia, suspect dilutional component, resolved  * Hgb normal on admit. Hgb 11.5 on 7/16. No overt clinical signs of major bleeding.  * Hgb subsequently stable and later normalized.    Clinically Significant Risk Factors        # Hypokalemia: Lowest K = 3 mmol/L (Ref range: 3.4-5.3) in last 2 days, will replace as needed                 # Severe Malnutrition: based on nutrition assessment          COVID-19 testing.  COVID-19 PCR Results    COVID-19 PCR Results 11/6/21 11/18/21 7/13/22 7/30/22   SARS CoV2 PCR Negative Negative Negative Negative      Comments are available for some flowsheets but are not being displayed.         COVID-19 Antibody Results, Testing for Immunity    COVID-19 Antibody Results, Testing for Immunity   No data to display.             Diet: Advance Diet as Tolerated  Regular Diet Adult Thin Liquids (level 0) (Upright  "position, alert, and assist as needed)  Room Service    Prophylaxis: PCD's, ambulation.   [place] Catheter: Not present  Central Lines: None  Code Status: No CPR- Do NOT Intubate    Disposition Plan   Expected discharge: 1-2d recommended to LTC facility pending bed availability.  Entered: Shaka Rose MD 10/20/2022, 7:01 AM     I spent approximately 50 minutes bedside and on the inpatient unit today managing the care of patient. Over 50% of my time on the unit was spent in extensive chart review, counseling the patient/family and/or coordinating care regarding services listed in this note.      Interval History   Doing OK.  Denies dyspnea.    -Data reviewed today: I reviewed all new labs and imaging over the last 24 hours. I personally reviewed no images or EKG's today.    Physical Exam    , Blood pressure 120/82, pulse 73, temperature 97.2  F (36.2  C), temperature source Oral, resp. rate 16, height 1.676 m (5' 6\"), weight 121.4 kg (267 lb 10.2 oz), SpO2 95 %, not currently breastfeeding. O2 Device: None (Room air)    Vitals:    10/15/22 0602 10/16/22 0520 10/19/22 0645   Weight: 126.4 kg (278 lb 9.6 oz) 128.9 kg (284 lb 3.2 oz) 121.4 kg (267 lb 10.2 oz)     Vital Signs with Ranges  Temp:  [97.2  F (36.2  C)-98.3  F (36.8  C)] 97.2  F (36.2  C)  Pulse:  [70-77] 73  Resp:  [16-18] 16  BP: ()/(67-82) 120/82  SpO2:  [95 %-98 %] 95 %  Patient Vitals for the past 24 hrs:   BP Temp Temp src Pulse Resp SpO2   10/19/22 2052 120/82 97.2  F (36.2  C) Oral 73 16 95 %   10/19/22 1542 95/68 98.3  F (36.8  C) Oral 70 18 98 %   10/19/22 0742 112/67 97.2  F (36.2  C) Oral 77 18 95 %     I/O's Last 24 hours  I/O last 3 completed shifts:  In: 600 [P.O.:600]  Out: -     Constitutional: Awake, alert.  Respiratory: Diminished in bases. No crackles or wheezes.  Cardiovascular: RRR, no m/r/g.  GI:   Skin/Integumen:   Other:        Data   Recent Labs   Lab 10/19/22  0828 10/19/22  0024 10/18/22  0939 10/17/22  0857 " 10/16/22  1548 10/16/22  0933   WBC  --   --   --   --   --  4.9   HGB  --   --   --   --   --  15.0   MCV  --   --   --   --   --  89     --  275 255  --  270   NA  --   --   --   --   --  137   POTASSIUM  --  3.9 3.0* 3.6   < > 3.2*   CHLORIDE  --   --   --   --   --  99   CO2  --   --   --   --   --  32   BUN  --   --   --   --   --  14   CR  --   --   --   --   --  0.79   ANIONGAP  --   --   --   --   --  6   ASHIA  --   --   --   --   --  8.8   GLC  --   --   --   --   --  111*    < > = values in this interval not displayed.     Recent Labs   Lab Test 10/16/22  0933 10/09/22  2106 10/08/22  1112 09/30/22  0850 09/20/22  0916   * 90 90 93 106*     Recent Labs   Lab 10/16/22  0933   WBC 4.9         No results found for this or any previous visit (from the past 24 hour(s)).    Medications   All medications were reviewed.    - MEDICATION INSTRUCTIONS -         ARIPiprazole  10 mg Oral QPM     aspirin  325 mg Oral Daily     atorvastatin  40 mg Oral QPM     enoxaparin ANTICOAGULANT  40 mg Subcutaneous Q12H     furosemide  20 mg Oral BID     [Held by provider] lisinopril  5 mg Oral Daily     miconazole   Topical BID     multivitamin w/minerals  1 tablet Oral Daily     OLANZapine  2.5 mg Oral At Bedtime     polyethylene glycol  17 g Oral Daily     potassium chloride  20 mEq Oral Daily     senna-docusate  1-2 tablet Oral BID     sertraline  50 mg Oral Daily     sodium chloride (PF)  3 mL Intracatheter Q8H     spironolactone  12.5 mg Oral Daily     sore throat, bisacodyl, calcium carbonate, diclofenac, diphenhydrAMINE **OR** diphenhydrAMINE, EPINEPHrine, hydrALAZINE, labetalol, lidocaine 4%, lidocaine (buffered or not buffered), - MEDICATION INSTRUCTIONS -, naloxone **OR** naloxone **OR** naloxone **OR** naloxone, OLANZapine zydis, ondansetron **OR** ondansetron, oxyCODONE, pramoxine, sodium chloride (PF)

## 2022-10-21 NOTE — PLAN OF CARE
DATE & TIME: 10/20/22, 2300 - 7030   Cognitive Concerns/ Orientation : A&O x 2, disoriented to time and situation. Confused and anxious at times   BEHAVIOR & AGGRESSION TOOL COLOR: Green  ABNL VS/O2: Vital signs being done twice daily. Deferred pvernight   MOBILITY: Up assist x 2 with Mariah steady/lift. Turned and repositioned  PAIN MANAGMENT: Refused analgesia for pain to right back/flank wound. Repositioning off wound helpful  DIET: Regular  BOWEL/BLADDER: Incontinent of urine. No BM this shift  ABNL LAB/BG: NA  DRAIN/DEVICES: PIV SL  TELEMETRY RHYTHM: NA  SKIN: Sin pale, dry and flaky. Mepilex dressing to right flank/back remain CDI. Blanchable redness to coccyx, perineum and buttock. BLE patty, elevated on pillows. Redness/excoriation to abdominal folds, armpits and under breasts. Scheduled Miconazole available  TESTS/PROCEDURES: NA  D/C DATE: Pending LTC placement  Discharge Barriers: Placement  OTHER IMPORTANT INFO: Intermittent vaginal bleeding persist. SW following

## 2022-10-21 NOTE — PROGRESS NOTES
"SPIRITUAL HEALTH SERVICES Progress Note  [facility] [address]    Saw ptnt per follow-up. She expressed the she \"enjoyed having a friend with her always\" and finds great comfort in her stuffed animal.  \"He sleeps with me.\"   I provided emotional support, reflective conversation, and prayer.     I will continue to follow-up.  services remain available.     DILMA BeckDiv  Chaplain Resident   [phone]   "

## 2022-10-21 NOTE — PROGRESS NOTES
[facility]    Internal Medicine Hospitalist Progress Note  10/21/2022  I evaluated patient on the above date.    Shaka Rose Jr., MD  [phone] (p)  Text Page  Vocera        Assessment & Plan New actions/orders today (10/21/2022) are underlined.    Cristy Bailey is a 77 year old female with a PMHx significant for morbid obesity; HTN; and HLD; who presented to [facility] 7/13/2022 with complete left-sided paralysis, left-sided facial droop, and difficulty speaking. CTA head remarkable for right MCA occlusion. She was given tenecteplase and subsequently transferred to [facility] 7/13/2022 for thrombectomy.     Hospital stay complicated by development and progression of flank wound.  This subsequently required surgical debridement and placement of wound vac (8/2) that was subsequently removed (8/22).     Prolonged hospitalization related todifficulties finding LTC placement.      Acute R MCA ischemic stroke with edema and right to left midline shift - s/p tenecteplase and subsequent R MCA mechanical thrombectomy 7/13/22  * Presented to [facility] 7/13 with complete paralysis, left-sided facial droop, and aphasia. Code stroke initiated. Head CT 7/13 showed signs of an evolving R MCA infarct. CTA head 7/13 showed a right carotid terminus occlusion, right middle cerebral artery M1 segment occlusion with poor opacification of the more distal right MCA branches/poor collateral flow. CTA neck 7/13 negative for acute occlusions. Pt given tenecteplase 7/13 at 13:11. Noted to be agitated and was subsequently intubated given need for procedure.   * Subsequently transferred to [facility] 7/13/2022 where she underwent above procedure.   * Extubated 7/14. Head CT 7/14 showed evolution of acute infarct involving the R MCA distribution with increased swelling, cortical effacement, and new mild right-to-left midline shift; questionable hypoattenuation involving the bilateral occipital lobes  which could be artifactual.  Echo 7/14 showed LVEF 50%, grade 1 diastolic dysfunction.   * Started ASA and atorvastatin per stroke team.   * Repeat head CT 7/15 showed evolving R MCA stroke with areas of edema, overall stable.  -- Continue  mg daily, atorvastatin 40 mg daily.  -- Goal SBP <140/90 and met   -- Monitored on telemetry for first several wks of hospital stay without abnl rhythm so will not order 30d cardiac event monitor at discharge per neurology recommendations (from 10/18)  -- Follow up with [facility] in 6-8 wks    Anxiety  Cognitive impairment with intermittent situational confusion  Metabolic encephalopathy, resolved  * On no psychiatric medications PTA.  * Pt with issues with anxiety and intermittent situational confusion this hospitalization.    * Seen by Psychiatry this hospitalization.  * Started on multiple medications this hospitalization including quetiapine, olanzapine, sertraline, mirtazapine, PRN hydroxyzine, PRN lorazepam.  * Medication regimen was adjusted/titrated. Mirtazapine, PRN lorazepam and PRN hydroxyzine subsequently stopped. See previous progress notes for more details.  * PRN ativan stopped on 08/27/22 as leading to increased confusion. Hydroxyzine stopped as well.   -- Continue aripiprazole 10 mg qpm; olanzapine 2.5 mg at bedtime; sertraline 50 mg daily  -- Continue PRN olanzapine 5 mg q6h    Lower back/R flank wound/abscess, suspect dt pressure type injury - s/p surgical debridement on 7/30/2022 and placement of wound vac on 8/2/2022 (subsequently removed 8/22/2022).  OA  Hx of bilateral hip replacements  Hx of chronic neck and back pain  *  Patient has chronic back pain, reports having it over the last 2 years. MRI L-spine in 2018 showed multilevel degenerative changes with mild central stenosis.  * Pt with significant back pain early on in hospital stay. Was requiring IV hydromorphone.  Dose had to be decreased dt somnolence.  * During this stay, patient was noted with  5-6cm by 2-3cm wound with swelling/fluid/blistering in a fold of her lower back, did not appear infected; this seemed to be the source of her pain. Padded dressing placed and WOC RN ordered. Pain initially improved.   * Was started on a course of amoxicillin-clavulanate on 7/24.   * On 7/26, was re-evaluated by WOC RN. Wound appeared more erythematous and purulence expressed. Worsening with shear stress from lift. Procal <0.05, WBC WNL. Abx changed to IV clindamycin.  * Wound cultures obtained. On 7/28, wound grew proteus and Staph aureus (MRSA neg). US neg for abscess.   * Lost IV access on 7/28 so abx were changed to oral clindamycin and oral levaquin. IV access re-established but was continued on oral abx.  * General surgery consulted, ultimately underwent excisional debridement of R flank abscess in OR on 7/30. Intraop cultures showed polymicrobial growth with proteus mirabilis x2 strains (both pan-sensitive), corynebacterium striatum and enterococcus faecalis.  * Wound vac placed per general surgery on 8/2.  * ID consulted on 8/2 and abx narrowed to amoxicillin-clavulanate alone, completed an additional 5 days of treatment on 8/7.   * Wound vac removed 8/22.  -- Continue local wound cares per WOC RN, follows weekly  -- Continue PRN oxycodone; minimize opioids as able.  -- Continue regular repositioning  -- Management of other pressure injuries as below    Volume overload secondary to acute diastolic CHF exacerbation, resolved  Essential hypertension  * Not on/needing meds PTA.   * As above, echo this stay showed EF 50% with grade I diastolic dysfunction; RV not well visualized but appeared mild-moderately dilated with global systolic function probably mildly reduced.   * BPs were initially soft this stay and required IVFs. BPs improved.   * Subsequently developed volume overload with elevated BP's and diuresed with IV furosemide, subsequently transitioned to PO furosemide.  * Issues with recurrent hypokalemia  on furosemide; noted often refusion potassium.  * Started on lisinopril, but subsequently stopped due to soft BP's on furosemide.  * Furosemide increased and spironolactone added 9/3.  -- Continue furosemide 20 mg BID; spironolactone 12.5 mg daily  -- Monitor i/o's, daily wts    Severe malnutrition related to acute and chronic medical issues  Significant weight loss this hospitalization  * Nutritionist following. Appetite poor this stay, needing encouragement to take po.   * There was some discussion previously about needing feeding support, but noted that patient does not want to have a feeding tube.  * As of 10/19, has had 107 lb weight loss since admit (374 lbs on 7/14; 267 lbs 10/19); noted some contribution from diuresis for CHF.  -- Continue to encourage oral intake  -- Continue supplements  -- Patient does not want a feeding tube and her health care agent Leo agrees with this, consistent with previous wishes     Vaginal bleeding  Abnormal endometrial thickening on US  * Pt with vaginal bleeding intermittently this hospitalization.  * Pelvic US 10/8 showed abnormal endometrial thickening to 14 mm.   * Gyn consulted 10/9 and recommend outpatient evaluation.  * Hgb overall stable.  -- Follow-up with Gyn outpatient for endometrial sampling in the near future    Deep tissue pressure injury (DTPI) left medial thigh/groin region, hospital acquired, progressed to HAPI stage 2 pressure injury; related to purwick external catheter tubing (not the purwick device itself)  Pressure injuries, bilateral buttocks  * DTPI left medial thigh/groin region noted this hospitalization. WOC RN consulted.   * Pressure injuries in bilateral buttocks noted 9/2. Seen by WOC RN.  -- Continue local wound cares per WOC RN  -- Continue regular repositioning    Dyslipidemia  * FLP this stay showed tot cholest 163, HDL 47, LDL 91, .   * Started on atorvastatin 40 mg daily  -- Continue atorvastatin 40 mg daily     Chronic bilateral LE  edema with chronic venous stasis dermatitis and chronic skin changes  Hx of LLE cellulitis  * Was hospitalized in 11/2021 for sepsis dt to pneumonia and LLE cellulitis.   * During this stay, BLE noted to be patty and edematous, no unilateral leg swelling appreciated; LLE had patchy areas of erythema, no fluctuance, no painful areas with palpation; legs warm to touch. Lymphedema consulted.   -- Cont LE elevation, lymphedema wraps  -- Continue diuretic as above     Hypokalemia, hypophosphatemia.  * Potassium and phosphorus low at times this hospitalization. Potassium low related to diuretics. Treated with replacement.  - Continue PRN K and phos replacement    Intertriginous dermatitis  * Chronic and stable.  -- Continue clotrimazole powder     Constipation  -- Continue scheduled senna-docusate, polyethylene glycol, PRN bisacodyl    Suspected meningioma left parietal region, incidentally seen on admission CT on 7/13/2022  * Head CT 7/13 showed a calcified extra-axial mass overlying the left parietal region measuring 1.4 cm, potentially representing a meningioma.  -- Will need serial monitoring OP after discharge     Suspected sleep apnea  * O2 drop to mid 80s overnight 8/14. Briefly placed on 4LPM, which she then removed and then slept okay with sats in 90s thereafter.  -- Consider sleep study as outpatient     Morbid obesity with significant weight loss this hospitalization  * BMI 59 on admission.  * Significant weight loss this hospitalization as above (107 lbs as of 10/19). BMI 43.2 on 10/20.  -- Continue aggressive dietary and lifestyle modifications     Goals of care  Failure to thrive  * During this hospitalization, discussed concern about overall prognosis, with her eating 0-25% of her meals and nutrition recommendations for alternative forms of nutrition; at that time, she had lost 70 lbs since admit (some of that was probably related to diuresis and some related to variable weights in hospital, but still  significant).   * Patient and significant other/HCA Leo, reiterated her wishes for DNR/DNI and no feeding tubes.  * Palliative care met with Leo 9/28 and he did not want hospice.  -- Continue restorative cares  -- Pt is DNR/DNI, no tube feedings    DISPOSITION  -- Plan LTC placement once facility available      OTHER RESOLVED/CHRONIC ISSUES:  Urinary retention, resolved  * Retaining urine on 8/14. UA WNL, not worrisome for infection.  * Requiring straight cath x1 on 8/20 PM.  * Subsequently good UOP, no longer requiring straight cath. No evidence of obstruction on PVR.      Dysphagia due to CVA, resolved  * Seen by SLP and noted with dysphagia. Initially required some intermittent fluid boluses.   * Was eventually able to advance to regular diet w/thin liquids.    Prolonged QTc.  * EKG on 7/13 showed QTc 494.   * Repeat EKG on 7/30 (while on levofloxacin) showed QTc stable at 475. Has since completed course of abx as below.  * Had been monitoring on telemetry this stay. No concerning findings so tele was ultimately dc'd 8/19.    Anemia, suspect dilutional component, resolved  * Hgb normal on admit. Hgb 11.5 on 7/16. No overt clinical signs of major bleeding.  * Hgb subsequently stable and later normalized.    Clinically Significant Risk Factors                        # Severe Malnutrition: based on nutrition assessment          COVID-19 testing.  COVID-19 PCR Results    COVID-19 PCR Results 11/6/21 11/18/21 7/13/22 7/30/22   SARS CoV2 PCR Negative Negative Negative Negative      Comments are available for some flowsheets but are not being displayed.         COVID-19 Antibody Results, Testing for Immunity    COVID-19 Antibody Results, Testing for Immunity   No data to display.             Diet: Advance Diet as Tolerated  Regular Diet Adult Thin Liquids (level 0) (Upright position, alert, and assist as needed)  Room Service    Prophylaxis: PCD's, ambulation.   [place] Catheter: Not present  Central Lines: None  Code  "Status: No CPR- Do NOT Intubate    Disposition Plan   Expected discharge: 1-2d recommended to LTC facility pending bed availability.  Entered: Shaka Rose MD 10/21/2022, 12:02 PM         Interval History   Doing OK.  Still very weak, needing a lift to get to chair/commode.    -Data reviewed today: I reviewed all new labs and imaging over the last 24 hours. I personally reviewed no images or EKG's today.    Physical Exam    , Blood pressure 94/62, pulse 77, temperature 97.6  F (36.4  C), temperature source Axillary, resp. rate 16, height 1.676 m (5' 6\"), weight 121.4 kg (267 lb 10.2 oz), SpO2 93 %, not currently breastfeeding. O2 Device: None (Room air)    Vitals:    10/15/22 0602 10/16/22 0520 10/19/22 0645   Weight: 126.4 kg (278 lb 9.6 oz) 128.9 kg (284 lb 3.2 oz) 121.4 kg (267 lb 10.2 oz)     Vital Signs with Ranges  Temp:  [97.6  F (36.4  C)-98  F (36.7  C)] 97.6  F (36.4  C)  Pulse:  [74-81] 77  Resp:  [16] 16  BP: ()/(55-69) 94/62  SpO2:  [93 %-95 %] 93 %  Patient Vitals for the past 24 hrs:   BP Temp Temp src Pulse Resp SpO2   10/21/22 0911 94/62 -- -- -- -- --   10/21/22 0854 102/55 97.6  F (36.4  C) Axillary 77 16 93 %   10/20/22 1722 101/63 -- -- 74 -- --   10/20/22 1554 105/69 98  F (36.7  C) Axillary 81 16 95 %     I/O's Last 24 hours  I/O last 3 completed shifts:  In: 260 [P.O.:260]  Out: -     Constitutional: Awake, alert, pleasant, conversant.  Respiratory: Diminished in bases. No crackles or wheezes.  Cardiovascular: RRR, no m/r/g.  GI:   Skin/Integumen:   Other:        Data   Recent Labs   Lab 10/21/22  0900 10/20/22  0900 10/19/22  0828 10/19/22  0024 10/16/22  1548 10/16/22  0933   WBC  --   --   --   --   --  4.9   HGB  --   --   --   --   --  15.0   MCV  --   --   --   --   --  89    224 272  --    < > 270   NA  --   --   --   --   --  137   POTASSIUM 3.9 3.8  --  3.9   < > 3.2*   CHLORIDE  --   --   --   --   --  99   CO2  --   --   --   --   --  32   BUN  --   --   --   " --   --  14   CR  --   --   --   --   --  0.79   ANIONGAP  --   --   --   --   --  6   ASHIA  --   --   --   --   --  8.8   GLC  --   --   --   --   --  111*    < > = values in this interval not displayed.     Recent Labs   Lab Test 10/16/22  0933 10/09/22  2106 10/08/22  1112 09/30/22  0850 09/20/22  0916   * 90 90 93 106*     Recent Labs   Lab 10/16/22  0933   WBC 4.9         No results found for this or any previous visit (from the past 24 hour(s)).    Medications   All medications were reviewed.    - MEDICATION INSTRUCTIONS -         ARIPiprazole  10 mg Oral QPM     aspirin  325 mg Oral Daily     atorvastatin  40 mg Oral QPM     enoxaparin ANTICOAGULANT  40 mg Subcutaneous Q12H     furosemide  20 mg Oral BID     miconazole   Topical BID     multivitamin w/minerals  1 tablet Oral Daily     OLANZapine  2.5 mg Oral At Bedtime     polyethylene glycol  17 g Oral Daily     potassium chloride  20 mEq Oral Daily     senna-docusate  1-2 tablet Oral BID     sertraline  50 mg Oral Daily     sodium chloride (PF)  3 mL Intracatheter Q8H     spironolactone  12.5 mg Oral Daily     sore throat, bisacodyl, calcium carbonate, diclofenac, diphenhydrAMINE **OR** diphenhydrAMINE, EPINEPHrine, hydrALAZINE, labetalol, lidocaine 4%, lidocaine (buffered or not buffered), - MEDICATION INSTRUCTIONS -, naloxone **OR** naloxone **OR** naloxone **OR** naloxone, OLANZapine zydis, ondansetron **OR** ondansetron, oxyCODONE, pramoxine, sodium chloride (PF)

## 2022-10-21 NOTE — PLAN OF CARE
Goal Outcome Evaluation:      Plan of Care Reviewed With: patient    Overall Patient Progress: no changeOverall Patient Progress: no change    Summary: Presents with Acute R MCA ischemic stroke. Now needs LTC placement.  DATE & TIME: 10/20/22 [id number]  Cognitive Concerns/ Orientation: A&Ox2,  disoriented to time and situation. Confused and intermittently anxious.   BEHAVIOR & AGGRESSION TOOL COLOR: Green  ABNL VS: VSS on RA.   MOBILITY: Up with assist of 2 with lata steady or lift, on pulsate mattress. Turn/repo q2h. BLE elevated on pillows. Not OOB this shift.  PAIN MANAGMENT: C/o of BLE pain, gave hot packs with relief. Declined pain medications.  DIET: Regular. Needs encouragement to eat and drink, poor appetite, did not eat dinner. Likes hot chocolate and ginger ale.  BOWEL/BLADDER: Incontinent at times, no BM this shift.  ABNL LAB/BG: NA  DRAIN/DEVICES: LUE PIV SL  TELEMETRY RHYTHM: N/A  SKIN: Pale. Dry and flaky. Mepilex dressing to R flank/back replaced this shift per plan of care. Blanchable redness to coccyx/buttocks/perineum. BLE edema +2, patty calves, BLE elevated on pillows. Redness and excoriation in abdominal folds, arm pits, and under the breasts - scheduled Miconazole powder applied.  TESTS/PROCEDURES: None  D/C DATE: TBD pending LTC placement.  OTHER IMPORTANT INFO: Patient continues to have intermittent vaginal bleeding. SW following.

## 2022-10-21 NOTE — PLAN OF CARE
DATE & TIME:10/21/2022 AM shift   Cognitive Concerns/ Orientation : Cooperative. Anxious, forgetful and tearful at time. Needs a lot of encouragement.   BEHAVIOR & AGGRESSION TOOL COLOR: Green    ABNL VS/O2: VSS on RA except  BP soft; 94/62. Hold scheduled lasix and amlodipine medication, MD aware.   MOBILITY: Assist of 2 with lift for transfer. Up to recliner for breakfast. T/R q2hr. Favors left side.   PAIN MANAGMENT: c/o back pain r/t wound, repositioned to comfort. Pt has tenderness to her rickie LE, repositioned as needed.   DIET: Regular. Poor appetite. Need encouragement.    BOWEL/BLADDER:  Urine incontinent. BM x 3; soft/loose. Continues to have very small/smear bloody discharges.   ABNL LAB/BG: NA  DRAIN/DEVICES: PIV SL  SKIN: Pale, dry and flaky. Blanchable redness to coccyx, perineum and buttock. BLE patty, elevated on pillows. Redness/excoriation to abdominal folds, armpits and under breasts. Scheduled Miconazole powder, and PRN. Dressing changed to posterior waist wound.   TESTS/PROCEDURES: NA  D/C DATE: Pending LTC placement, SW is following.   Discharge Barriers: Placement

## 2022-10-22 NOTE — PLAN OF CARE
Goal Outcome Evaluation:      Plan of Care Reviewed With: patient    Overall Patient Progress: no change    DATE & TIME: 10/21/2022 [id number]  Cognitive Concerns/ Orientation : A&O x2-3, Cooperative but anxious at times. Forgetful  BEHAVIOR & AGGRESSION TOOL COLOR: Green    ABNL VS/O2: BID VS  MOBILITY: A x2, lift. T&R, refuses at times. Educated on skin breakdown  PAIN MANAGMENT: Denies   DIET: Regular. Poor appetite. Needs encouragement.    BOWEL/BLADDER: Incontinent. Senna held for loose BM's all day. Bloody vaginal discharge, MD aware  ABNL LAB/BG: NA  DRAIN/DEVICES: PIV SL  SKIN: Pale, dry and flaky. Blanchable redness to coccyx, perineum and buttock. BLE patty, elevated on pillows. Redness/excoriation to abdominal folds, armpits and under breasts. Scheduled Miconazole powder.   D/C DATE: Pending LTC placement, SW is following.   Discharge Barriers: Placement

## 2022-10-22 NOTE — PROGRESS NOTES
[facility]    Medicine Progress Note - Hospitalist Service    Date of Admission:  7/13/2022    Assessment & Plan   Cristy Bailey is a 77 year old female with a PMHx significant for morbid obesity; HTN; and HLD; who presented to [facility] 7/13/2022 with complete left-sided paralysis, left-sided facial droop, and difficulty speaking. CTA head remarkable for right MCA occlusion. She was given tenecteplase and subsequently transferred to [facility] 7/13/2022 for thrombectomy.      Hospital stay complicated by development and progression of flank wound.  This subsequently required surgical debridement and placement of wound vac (8/2) that was subsequently removed (8/22).      Prolonged hospitalization related todifficulties finding LTC placement.        Acute R MCA ischemic stroke with edema and right to left midline shift - s/p tenecteplase and subsequent R MCA mechanical thrombectomy 7/13/22  * Presented to [facility] 7/13 with complete paralysis, left-sided facial droop, and aphasia. Code stroke initiated. Head CT 7/13 showed signs of an evolving R MCA infarct. CTA head 7/13 showed a right carotid terminus occlusion, right middle cerebral artery M1 segment occlusion with poor opacification of the more distal right MCA branches/poor collateral flow. CTA neck 7/13 negative for acute occlusions. Pt given tenecteplase 7/13 at 13:11. Noted to be agitated and was subsequently intubated given need for procedure.   * Subsequently transferred to [facility] 7/13/2022 where she underwent above procedure.   * Extubated 7/14. Head CT 7/14 showed evolution of acute infarct involving the R MCA distribution with increased swelling, cortical effacement, and new mild right-to-left midline shift; questionable hypoattenuation involving the bilateral occipital lobes which could be artifactual.  Echo 7/14 showed LVEF 50%, grade 1 diastolic dysfunction.   * Started ASA and atorvastatin per stroke team.    * Repeat head CT 7/15 showed evolving R MCA stroke with areas of edema, overall stable.  -- Continue  mg daily, atorvastatin 40 mg daily.  -- Goal SBP <140/90 and met   -- Monitored on telemetry for first several wks of hospital stay without abnl rhythm so will not order 30d cardiac event monitor at discharge per neurology recommendations (from 10/18)  -- Follow up with [facility] in 6-8 wks     Anxiety  Cognitive impairment with intermittent situational confusion  Metabolic encephalopathy, resolved  * On no psychiatric medications PTA.  * Pt with issues with anxiety and intermittent situational confusion this hospitalization.    * Seen by Psychiatry this hospitalization.  * Started on multiple medications this hospitalization including quetiapine, olanzapine, sertraline, mirtazapine, PRN hydroxyzine, PRN lorazepam.  * Medication regimen was adjusted/titrated. Mirtazapine, PRN lorazepam and PRN hydroxyzine subsequently stopped. See previous progress notes for more details.  * PRN ativan stopped on 08/27/22 as leading to increased confusion. Hydroxyzine stopped as well.   -- Continue aripiprazole 10 mg qpm; olanzapine 2.5 mg at bedtime; sertraline 50 mg daily  -- Continue PRN olanzapine 5 mg q6h     Lower back/R flank wound/abscess, suspect dt pressure type injury - s/p surgical debridement on 7/30/2022 and placement of wound vac on 8/2/2022 (subsequently removed 8/22/2022).  OA  Hx of bilateral hip replacements  Hx of chronic neck and back pain  *  Patient has chronic back pain, reports having it over the last 2 years. MRI L-spine in 2018 showed multilevel degenerative changes with mild central stenosis.  * Pt with significant back pain early on in hospital stay. Was requiring IV hydromorphone.  Dose had to be decreased dt somnolence.  * During this stay, patient was noted with 5-6cm by 2-3cm wound with swelling/fluid/blistering in a fold of her lower back, did not appear infected; this seemed to be the source of  her pain. Padded dressing placed and WOC RN ordered. Pain initially improved.   * Was started on a course of amoxicillin-clavulanate on 7/24.   * On 7/26, was re-evaluated by WOC RN. Wound appeared more erythematous and purulence expressed. Worsening with shear stress from lift. Procal <0.05, WBC WNL. Abx changed to IV clindamycin.  * Wound cultures obtained. On 7/28, wound grew proteus and Staph aureus (MRSA neg). US neg for abscess.   * Lost IV access on 7/28 so abx were changed to oral clindamycin and oral levaquin. IV access re-established but was continued on oral abx.  * General surgery consulted, ultimately underwent excisional debridement of R flank abscess in OR on 7/30. Intraop cultures showed polymicrobial growth with proteus mirabilis x2 strains (both pan-sensitive), corynebacterium striatum and enterococcus faecalis.  * Wound vac placed per general surgery on 8/2.  * ID consulted on 8/2 and abx narrowed to amoxicillin-clavulanate alone, completed an additional 5 days of treatment on 8/7.   * Wound vac removed 8/22.  -- Continue local wound cares per WOC RN, follows weekly  -- Continue PRN oxycodone; minimize opioids as able.  -- Continue regular repositioning  -- Management of other pressure injuries as below     Volume overload secondary to acute diastolic CHF exacerbation, resolved  Essential hypertension  * Not on/needing meds PTA.   * As above, echo this stay showed EF 50% with grade I diastolic dysfunction; RV not well visualized but appeared mild-moderately dilated with global systolic function probably mildly reduced.   * BPs were initially soft this stay and required IVFs. BPs improved.   * Subsequently developed volume overload with elevated BP's and diuresed with IV furosemide, subsequently transitioned to PO furosemide.  * Issues with recurrent hypokalemia on furosemide; noted often refusing potassium.  * Started on lisinopril, but subsequently stopped due to soft BP's on furosemide.  *  Furosemide increased and spironolactone added 9/3.  -- Continue furosemide 20 mg BID;   -- continue spironolactone 12.5 mg daily as bp allows (has not received this in the last 2 days due to soft bp)  -- Monitor i/o's, daily wts     Severe malnutrition related to acute and chronic medical issues  Significant weight loss this hospitalization  * Nutritionist following. Appetite poor this stay, needing encouragement to take po.   * There was some discussion previously about needing feeding support, but noted that patient does not want to have a feeding tube.  * As of 10/19, has had 107 lb weight loss since admit (374 lbs on 7/14; 267 lbs 10/19); noted some contribution from diuresis for CHF.  -- Continue to encourage oral intake  -- Continue supplements  -- Patient does not want a feeding tube and her health care agent Leo agrees with this, consistent with previous wishes     Vaginal bleeding  Abnormal endometrial thickening on US  * Pt with vaginal bleeding intermittently this hospitalization.  * Pelvic US 10/8 showed abnormal endometrial thickening to 14 mm.   * Gyn consulted 10/9 and recommend outpatient evaluation.  * Hgb overall stable.  -- Follow-up with Gyn outpatient for endometrial sampling in the near future     Deep tissue pressure injury (DTPI) left medial thigh/groin region, hospital acquired, progressed to HAPI stage 2 pressure injury; related to purwick external catheter tubing (not the purwick device itself)  Pressure injuries, bilateral buttocks  * DTPI left medial thigh/groin region noted this hospitalization. WOC RN consulted.   * Pressure injuries in bilateral buttocks noted 9/2. Seen by WOC RN.  -- Continue local wound cares per WOC RN  -- Continue regular repositioning     Dyslipidemia  * FLP this stay showed tot cholest 163, HDL 47, LDL 91, .   * Started on atorvastatin 40 mg daily  -- Continue atorvastatin 40 mg daily     Chronic bilateral LE edema with chronic venous stasis dermatitis  and chronic skin changes  Hx of LLE cellulitis  * Was hospitalized in 11/2021 for sepsis dt to pneumonia and LLE cellulitis.   * During this stay, BLE noted to be patty and edematous, no unilateral leg swelling appreciated; LLE had patchy areas of erythema, no fluctuance, no painful areas with palpation; legs warm to touch. Lymphedema consulted.   -- Cont LE elevation, lymphedema wraps  -- Continue diuretic as above     Hypokalemia, hypophosphatemia.  * Potassium and phosphorus low at times this hospitalization. Potassium low related to diuretics. Treated with replacement.  - Continue PRN K and phos replacement     Intertriginous dermatitis  * Chronic and stable.  -- Continue clotrimazole powder     Constipation  -- Continue scheduled senna-docusate, polyethylene glycol, PRN bisacodyl     Suspected meningioma left parietal region, incidentally seen on admission CT on 7/13/2022  * Head CT 7/13 showed a calcified extra-axial mass overlying the left parietal region measuring 1.4 cm, potentially representing a meningioma.  -- Will need serial monitoring OP after discharge     Suspected sleep apnea  * O2 drop to mid 80s overnight 8/14. Briefly placed on 4LPM, which she then removed and then slept okay with sats in 90s thereafter.  -- Consider sleep study as outpatient     Morbid obesity with significant weight loss this hospitalization  * BMI 59 on admission.  * Significant weight loss this hospitalization as above (107 lbs as of 10/19). BMI 43.2 on 10/20.  -- Continue aggressive dietary and lifestyle modifications      Goals of care  Failure to thrive  * During this hospitalization, discussed concern about overall prognosis, with her eating 0-25% of her meals and nutrition recommendations for alternative forms of nutrition; at that time, she had lost 70 lbs since admit (some of that was probably related to diuresis and some related to variable weights in hospital, but still significant).   * Patient and significant  other/HCA Leo, reiterated her wishes for DNR/DNI and no feeding tubes.  * Palliative care met with Leo 9/28 and he did not want hospice.  -- Continue restorative cares  -- Pt is DNR/DNI, no tube feedings     DISPOSITION  -- Plan LTC placement once facility available        OTHER RESOLVED/CHRONIC ISSUES:  Urinary retention, resolved  * Retaining urine on 8/14. UA WNL, not worrisome for infection.  * Requiring straight cath x1 on 8/20 PM.  * Subsequently good UOP, no longer requiring straight cath. No evidence of obstruction on PVR.      Dysphagia due to CVA, resolved  * Seen by SLP and noted with dysphagia. Initially required some intermittent fluid boluses.   * Was eventually able to advance to regular diet w/thin liquids.     Prolonged QTc.  * EKG on 7/13 showed QTc 494.   * Repeat EKG on 7/30 (while on levofloxacin) showed QTc stable at 475. Has since completed course of abx as below.  * Had been monitoring on telemetry this stay. No concerning findings so tele was ultimately dc'd 8/19.     Anemia, suspect dilutional component, resolved  * Hgb normal on admit. Hgb 11.5 on 7/16. No overt clinical signs of major bleeding.  * Hgb subsequently stable and later normalized.       Diet: Advance Diet as Tolerated  Regular Diet Adult Thin Liquids (level 0) (Upright position, alert, and assist as needed)  Room Service    DVT Prophylaxis: Enoxaparin (Lovenox) SQ  [place] Catheter: Not present  Central Lines: None  Cardiac Monitoring: None  Code Status: No CPR- Do NOT Intubate      Disposition Plan     Expected Discharge Date: 10/25/2022    Discharge Delays: Placement - LTC  *Medically Ready for Discharge    Discharge Comments: Multiple referrerals out  Did receive her 2nd covid shot  LTC placement        The patient's care was discussed with the Bedside Nurse and Patient.    Lola Arias MD  Hospitalist Service  [facility]  Securely message with the Vocera Web Console (learn more  here)  Text page via [facility] Paging/Directory         Clinically Significant Risk Factors                        # Severe Malnutrition: based on nutrition assessment        ______________________________________________________________________    Interval History   Patient reports she is doing fine. Denies sob, abd pain, chest pain. Afebrile.     Data reviewed today: I reviewed all medications, new labs and imaging results over the last 24 hours. I personally reviewed no images or EKG's today.    Physical Exam   Vital Signs: Temp: 97.3  F (36.3  C) Temp src: Oral BP: 100/59 Pulse: 71   Resp: 20 SpO2: 95 % O2 Device: None (Room air)    Weight: 267 lbs 10.22 oz  General Appearance: Alert, awake and no apparent distress  Respiratory: CTAB, no wheezing  Cardiovascular: regular rate and rhythm  GI: soft and non-tender    Data   Recent Labs   Lab 10/22/22  0911 10/21/22  0900 10/20/22  0900 10/16/22  1548 10/16/22  0933   WBC  --   --   --   --  4.9   HGB  --   --   --   --  15.0   MCV  --   --   --   --  89    226 224   < > 270   NA  --   --   --   --  137   POTASSIUM 3.8 3.9 3.8   < > 3.2*   CHLORIDE  --   --   --   --  99   CO2  --   --   --   --  32   BUN  --   --   --   --  14   CR  --   --   --   --  0.79   ANIONGAP  --   --   --   --  6   ASHIA  --   --   --   --  8.8   GLC  --   --   --   --  111*    < > = values in this interval not displayed.     Medications     - MEDICATION INSTRUCTIONS -         ARIPiprazole  10 mg Oral QPM     aspirin  325 mg Oral Daily     atorvastatin  40 mg Oral QPM     enoxaparin ANTICOAGULANT  40 mg Subcutaneous Q12H     furosemide  20 mg Oral BID     miconazole   Topical BID     multivitamin w/minerals  1 tablet Oral Daily     OLANZapine  2.5 mg Oral At Bedtime     polyethylene glycol  17 g Oral Daily     potassium chloride  20 mEq Oral Daily     senna-docusate  1-2 tablet Oral BID     sertraline  50 mg Oral Daily     sodium chloride (PF)  3 mL Intracatheter Q8H     spironolactone   12.5 mg Oral Daily

## 2022-10-22 NOTE — PLAN OF CARE
Goal Outcome Evaluation:  DATE & TIME:10/21/2022 [id number]   Cognitive Concerns/ Orientation :A&O X2, Cooperative. Anxious, forgetful.  BEHAVIOR & AGGRESSION TOOL COLOR: Green    ABNL VS/O2: VSS on RA except  BP soft; 104/60. Held scheduled lasix and amlodipine medication at AM, MD aware.   MOBILITY: Assist of 2 with lift for transfer.  T/R q2hr. Favors left side.   PAIN MANAGMENT: Denies. No S&S of pain noted.   DIET: Regular. Poor appetite. Need encouragement.    BOWEL/BLADDER: Incontinent X1 small.  ABNL LAB/BG: NA  DRAIN/DEVICES: PIV SL  SKIN: Pale, dry and flaky. Blanchable redness to coccyx, perineum and buttock. BLE patty, elevated on pillows. Redness/excoriation to abdominal folds, armpits and under breasts. Scheduled Miconazole powder. Dressing changed to posterior waist wound @AM.   TESTS/PROCEDURES: NA  D/C DATE: Pending LTC placement, SW is following.   Discharge Barriers: Placement

## 2022-10-22 NOTE — PLAN OF CARE
DATE & TIME:10/22/2022 AM shift            Cognitive Concerns/ Orientation : Cooperative. Anxious, forgetful and tearful at time. Needs a lot of encouragement.   BEHAVIOR & AGGRESSION TOOL COLOR: Green     ABNL VS/O2: VSS on RA except  BP soft; 94/62. Hold scheduled Senna, Miralax, Aldoactone   MOBILITY: Assist of 2 with lift for transfer.   PAIN MANAGMENT: c/o back pain r/t wound, repositioned to comfort. Pt has tenderness to her rickie LE, repositioned as needed.   DIET: Regular. Poor appetite. Need encouragement.    BOWEL/BLADDER:  Urine incontinent. Continues to have very small/smear bloody discharges.   ABNL LAB/BG: NA  DRAIN/DEVICES: PIV SL  SKIN: Pale, dry and flaky. Blanchable redness to coccyx, perineum and buttock. BLE patty, elevated on pillows. Redness/excoriation to abdominal folds, armpits and under breasts. Scheduled Miconazole powder, and PRN. Dressing changed to posterior waist wound.   TESTS/PROCEDURES: NA  D/C DATE: Pending LTC placement, SW is following.   Discharge Barriers: Placement

## 2022-10-23 NOTE — CARE PLAN
DATE & TIME:10/23/2022 AM shift            Cognitive Concerns/ Orientation : Cooperative. Anxious, forgetful and tearful at time. Needs a lot of encouragement.   BEHAVIOR & AGGRESSION TOOL COLOR: Green     ABNL VS/O2: VSS on RA except  BP soft; 100/65, Held Aldoactone   MOBILITY: Assist of 2 with lift for transfer. T/R q2hr. Bed bath on this shift.    PAIN MANAGMENT: Denies pain. Repositioned to comfort. Pt has tenderness to her rickie LE, repositioned as needed.   DIET: Regular. Poor appetite. Need encouragement.    BOWEL/BLADDER:  Urine incontinent. Continues to have very small/smear bloody discharges. Refused scheduled Senna, Miralax  ABNL LAB/BG: NA  DRAIN/DEVICES: PIV SL  SKIN: Pale, dry and flaky. Blanchable redness to coccyx, perineum and buttock. BLE patty, elevated on pillows. Redness/excoriation to abdominal folds, armpits and under breasts. Scheduled Miconazole powder, and PRN. Refused posterior waist crease dressing change.  TESTS/PROCEDURES: NA  D/C DATE: Pending LTC placement, SW is following.   Discharge Barriers: Placement

## 2022-10-23 NOTE — PROGRESS NOTES
[facility]    Medicine Progress Note - Hospitalist Service    Date of Admission:  7/13/2022    Assessment & Plan   Cristy Bailey is a 77 year old female with a PMHx significant for morbid obesity; HTN; and HLD; who presented to [facility] 7/13/2022 with complete left-sided paralysis, left-sided facial droop, and difficulty speaking. CTA head remarkable for right MCA occlusion. She was given tenecteplase and subsequently transferred to [facility] 7/13/2022 for thrombectomy.      Hospital stay complicated by development and progression of flank wound.  This subsequently required surgical debridement and placement of wound vac (8/2) that was subsequently removed (8/22).      Prolonged hospitalization related todifficulties finding LTC placement.        Acute R MCA ischemic stroke with edema and right to left midline shift - s/p tenecteplase and subsequent R MCA mechanical thrombectomy 7/13/22  * Presented to [facility] 7/13 with complete paralysis, left-sided facial droop, and aphasia. Code stroke initiated. Head CT 7/13 showed signs of an evolving R MCA infarct. CTA head 7/13 showed a right carotid terminus occlusion, right middle cerebral artery M1 segment occlusion with poor opacification of the more distal right MCA branches/poor collateral flow. CTA neck 7/13 negative for acute occlusions. Pt given tenecteplase 7/13 at 13:11. Noted to be agitated and was subsequently intubated given need for procedure.   * Subsequently transferred to [facility] 7/13/2022 where she underwent above procedure.   * Extubated 7/14. Head CT 7/14 showed evolution of acute infarct involving the R MCA distribution with increased swelling, cortical effacement, and new mild right-to-left midline shift; questionable hypoattenuation involving the bilateral occipital lobes which could be artifactual.  Echo 7/14 showed LVEF 50%, grade 1 diastolic dysfunction.   * Started ASA and atorvastatin per stroke team.    * Repeat head CT 7/15 showed evolving R MCA stroke with areas of edema, overall stable.  -- Continue  mg daily, atorvastatin 40 mg daily.  -- Goal SBP <140/90 and met   -- Monitored on telemetry for first several wks of hospital stay without abnl rhythm so will not order 30d cardiac event monitor at discharge per neurology recommendations (from 10/18)  -- Follow up with [facility] in 6-8 wks     Anxiety  Cognitive impairment with intermittent situational confusion  Metabolic encephalopathy, resolved  * On no psychiatric medications PTA.  * Pt with issues with anxiety and intermittent situational confusion this hospitalization.    * Seen by Psychiatry this hospitalization.  * Started on multiple medications this hospitalization including quetiapine, olanzapine, sertraline, mirtazapine, PRN hydroxyzine, PRN lorazepam.  * Medication regimen was adjusted/titrated. Mirtazapine, PRN lorazepam and PRN hydroxyzine subsequently stopped. See previous progress notes for more details.  * PRN ativan stopped on 08/27/22 as leading to increased confusion. Hydroxyzine stopped as well.   -- Continue aripiprazole 10 mg qpm; olanzapine 2.5 mg at bedtime; sertraline 50 mg daily  -- Continue PRN olanzapine 5 mg q6h     Lower back/R flank wound/abscess, suspect dt pressure type injury - s/p surgical debridement on 7/30/2022 and placement of wound vac on 8/2/2022 (subsequently removed 8/22/2022).  OA  Hx of bilateral hip replacements  Hx of chronic neck and back pain  *  Patient has chronic back pain, reports having it over the last 2 years. MRI L-spine in 2018 showed multilevel degenerative changes with mild central stenosis.  * Pt with significant back pain early on in hospital stay. Was requiring IV hydromorphone.  Dose had to be decreased dt somnolence.  * During this stay, patient was noted with 5-6cm by 2-3cm wound with swelling/fluid/blistering in a fold of her lower back, did not appear infected; this seemed to be the source of  her pain. Padded dressing placed and WOC RN ordered. Pain initially improved.   * Was started on a course of amoxicillin-clavulanate on 7/24.   * On 7/26, was re-evaluated by WOC RN. Wound appeared more erythematous and purulence expressed. Worsening with shear stress from lift. Procal <0.05, WBC WNL. Abx changed to IV clindamycin.  * Wound cultures obtained. On 7/28, wound grew proteus and Staph aureus (MRSA neg). US neg for abscess.   * Lost IV access on 7/28 so abx were changed to oral clindamycin and oral levaquin. IV access re-established but was continued on oral abx.  * General surgery consulted, ultimately underwent excisional debridement of R flank abscess in OR on 7/30. Intraop cultures showed polymicrobial growth with proteus mirabilis x2 strains (both pan-sensitive), corynebacterium striatum and enterococcus faecalis.  * Wound vac placed per general surgery on 8/2.  * ID consulted on 8/2 and abx narrowed to amoxicillin-clavulanate alone, completed an additional 5 days of treatment on 8/7.   * Wound vac removed 8/22.  -- Continue local wound cares per WOC RN, follows weekly  -- Continue PRN oxycodone; minimize opioids as able.  -- Continue regular repositioning  -- Management of other pressure injuries as below     Volume overload secondary to acute diastolic CHF exacerbation, resolved  Essential hypertension  * Not on/needing meds PTA.   * As above, echo this stay showed EF 50% with grade I diastolic dysfunction; RV not well visualized but appeared mild-moderately dilated with global systolic function probably mildly reduced.   * BPs were initially soft this stay and required IVFs. BPs improved.   * Subsequently developed volume overload with elevated BP's and diuresed with IV furosemide, subsequently transitioned to PO furosemide.  * Issues with recurrent hypokalemia on furosemide; noted often refusing potassium.  * Started on lisinopril, but subsequently stopped due to soft BP's on furosemide.  *  Furosemide increased and spironolactone added 9/3.  -- Continue furosemide 20 mg BID;   -- continue spironolactone 12.5 mg daily as bp allows (has not received this in the last 3 days due to soft bp)  -- Monitor i/o's, daily wts     Severe malnutrition related to acute and chronic medical issues  Significant weight loss this hospitalization  * Nutritionist following. Appetite poor this stay, needing encouragement to take po.   * There was some discussion previously about needing feeding support, but noted that patient does not want to have a feeding tube.  * As of 10/19, has had 107 lb weight loss since admit (374 lbs on 7/14; 267 lbs 10/19); noted some contribution from diuresis for CHF.  -- Continue to encourage oral intake  -- Continue supplements  -- Patient does not want a feeding tube and her health care agent Leo agrees with this, consistent with previous wishes     Vaginal bleeding  Abnormal endometrial thickening on US  * Pt with vaginal bleeding intermittently this hospitalization.  * Pelvic US 10/8 showed abnormal endometrial thickening to 14 mm.   * Gyn consulted 10/9 and recommend outpatient evaluation.  * Hgb overall stable.  -- Follow-up with Gyn outpatient for endometrial sampling in the near future     Deep tissue pressure injury (DTPI) left medial thigh/groin region, hospital acquired, progressed to HAPI stage 2 pressure injury; related to purwick external catheter tubing (not the purwick device itself)  Pressure injuries, bilateral buttocks  * DTPI left medial thigh/groin region noted this hospitalization. WOC RN consulted.   * Pressure injuries in bilateral buttocks noted 9/2. Seen by WOC RN.  -- Continue local wound cares per WOC RN  -- Continue regular repositioning     Dyslipidemia  * FLP this stay showed tot cholest 163, HDL 47, LDL 91, .   * Started on atorvastatin 40 mg daily  -- Continue atorvastatin 40 mg daily     Chronic bilateral LE edema with chronic venous stasis dermatitis  and chronic skin changes  Hx of LLE cellulitis  * Was hospitalized in 11/2021 for sepsis dt to pneumonia and LLE cellulitis.   * During this stay, BLE noted to be patty and edematous, no unilateral leg swelling appreciated; LLE had patchy areas of erythema, no fluctuance, no painful areas with palpation; legs warm to touch. Lymphedema consulted.   -- Cont LE elevation, lymphedema wraps  -- Continue diuretic as above     Hypokalemia, hypophosphatemia.  * Potassium and phosphorus low at times this hospitalization. Potassium low related to diuretics. Treated with replacement.  - Continue PRN K and phos replacement     Intertriginous dermatitis  * Chronic and stable.  -- Continue clotrimazole powder     Constipation  -- Continue scheduled senna-docusate, polyethylene glycol, PRN bisacodyl     Suspected meningioma left parietal region, incidentally seen on admission CT on 7/13/2022  * Head CT 7/13 showed a calcified extra-axial mass overlying the left parietal region measuring 1.4 cm, potentially representing a meningioma.  -- Will need serial monitoring OP after discharge     Suspected sleep apnea  * O2 drop to mid 80s overnight 8/14. Briefly placed on 4LPM, which she then removed and then slept okay with sats in 90s thereafter.  -- Consider sleep study as outpatient     Morbid obesity with significant weight loss this hospitalization  * BMI 59 on admission.  * Significant weight loss this hospitalization as above (107 lbs as of 10/19). BMI 43.2 on 10/20.  -- Continue aggressive dietary and lifestyle modifications      Goals of care  Failure to thrive  * During this hospitalization, discussed concern about overall prognosis, with her eating 0-25% of her meals and nutrition recommendations for alternative forms of nutrition; at that time, she had lost 70 lbs since admit (some of that was probably related to diuresis and some related to variable weights in hospital, but still significant).   * Patient and significant  other/HCA Leo, reiterated her wishes for DNR/DNI and no feeding tubes.  * Palliative care met with Leo 9/28 and he did not want hospice.  -- Continue restorative cares  -- Pt is DNR/DNI, no tube feedings     DISPOSITION  -- Plan LTC placement once facility available        OTHER RESOLVED/CHRONIC ISSUES:  Urinary retention, resolved  * Retaining urine on 8/14. UA WNL, not worrisome for infection.  * Requiring straight cath x1 on 8/20 PM.  * Subsequently good UOP, no longer requiring straight cath. No evidence of obstruction on PVR.      Dysphagia due to CVA, resolved  * Seen by SLP and noted with dysphagia. Initially required some intermittent fluid boluses.   * Was eventually able to advance to regular diet w/thin liquids.     Prolonged QTc.  * EKG on 7/13 showed QTc 494.   * Repeat EKG on 7/30 (while on levofloxacin) showed QTc stable at 475. Has since completed course of abx as below.  * Had been monitoring on telemetry this stay. No concerning findings so tele was ultimately dc'd 8/19.     Anemia, suspect dilutional component, resolved  * Hgb normal on admit. Hgb 11.5 on 7/16. No overt clinical signs of major bleeding.  * Hgb subsequently stable and later normalized.       Diet: Advance Diet as Tolerated  Regular Diet Adult Thin Liquids (level 0) (Upright position, alert, and assist as needed)  Room Service    DVT Prophylaxis: Enoxaparin (Lovenox) SQ  [place] Catheter: Not present  Central Lines: None  Cardiac Monitoring: None  Code Status: No CPR- Do NOT Intubate      Disposition Plan     Expected Discharge Date: 10/25/2022    Discharge Delays: Placement - LTC  *Medically Ready for Discharge    Discharge Comments: Multiple referrerals out  Did receive her 2nd covid shot  LTC placement        The patient's care was discussed with the Bedside Nurse and Patient.    Lola Arias MD  Hospitalist Service  [facility]  Securely message with the Vocera Web Console (learn more  here)  Text page via [facility] Paging/Directory         Clinically Significant Risk Factors                        # Severe Malnutrition: based on nutrition assessment        ______________________________________________________________________    Interval History   Patient denies chest pain, sob, nausea or vomiting. She is afebrile.   Has not been getting aldactone last 3 days due to hold parameters, bp softer side    Data reviewed today: I reviewed all medications, new labs and imaging results over the last 24 hours. I personally reviewed no images or EKG's today.    Physical Exam   Vital Signs: Temp: 97.5  F (36.4  C) Temp src: Oral BP: 100/56 Pulse: 76   Resp: 16 SpO2: 92 % O2 Device: None (Room air)    Weight: 267 lbs 10.22 oz  General Appearance: Alert, awake and no apparent distress  Respiratory: CTAB, no wheezing  Cardiovascular: regular rate and rhythm  GI: soft and non-tender    Data   Recent Labs   Lab 10/23/22  0759 10/22/22  0911 10/21/22  0900 10/20/22  0900    263 226 224   POTASSIUM  --  3.8 3.9 3.8     Medications     - MEDICATION INSTRUCTIONS -         ARIPiprazole  10 mg Oral QPM     aspirin  325 mg Oral Daily     atorvastatin  40 mg Oral QPM     enoxaparin ANTICOAGULANT  40 mg Subcutaneous Q12H     furosemide  20 mg Oral BID     miconazole   Topical BID     multivitamin w/minerals  1 tablet Oral Daily     OLANZapine  2.5 mg Oral At Bedtime     polyethylene glycol  17 g Oral Daily     potassium chloride  20 mEq Oral Daily     senna-docusate  1-2 tablet Oral BID     sertraline  50 mg Oral Daily     sodium chloride (PF)  3 mL Intracatheter Q8H     spironolactone  12.5 mg Oral Daily

## 2022-10-24 NOTE — PLAN OF CARE
Goal Outcome Evaluation:  DATE & TIME:10/24/2022 [id number]  Cognitive Concerns/ Orientation : Cooperative. Anxious. Calls out frequently. Using call light at times.    BEHAVIOR & AGGRESSION TOOL COLOR: Green     ABNL VS/O2: VSS on RA, BP soft 98/72.   MOBILITY: A of 2 with lift for transfer. T/R q2hr. Refused to get out of the bed.  PAIN MANAGMENT: denied   DIET: Regular. Poor appetite. Need encouragement.    BOWEL/BLADDER:  Incont of urine, no BM this shift,  ABNL LAB/BG: NA  DRAIN/DEVICES: PIV SL   SKIN: Pale, dry and flaky. Blanchable redness to coccyx, perineum and buttock. BLE patty, refuses to elevated on pillows at times. Redness/excoriation to abdominal folds, armpits and under breasts. Scheduled Miconazole powder. R Flank  dressing changed. On air mattress.   TESTS/PROCEDURES: NA  D/C DATE: Pending LTC placement, SW is following.   Discharge Barriers: Placement , discontinued K and Mg protocols, on scheduled Potassium BID.

## 2022-10-24 NOTE — PROGRESS NOTES
A/O x4. VSS on RA. Up with A/2 and lift, not oob this shift. Turn and repo q2.  Incontinent B/B. Pt calling out occasionally during night. Pain relieved with repositioning. Regular diet. PIV SL.

## 2022-10-24 NOTE — PLAN OF CARE
DATE & TIME:10/23/2022 Evenings    Cognitive Concerns/ Orientation : Cooperative. Anxious. Calls out occasionally. Using call light at times. Reassured with cares.   BEHAVIOR & AGGRESSION TOOL COLOR: Green     ABNL VS/O2: VSS on RA.   MOBILITY:  2PA with lift for transfer. T/R q2hr. Refused to move to chair this evening.   PAIN MANAGMENT: Occasional LE discomfort. Repositioned to comfort. PRN Oxy 2.5mg given. R flank discomfort. Dressing changed, skin cleaned, lotioned, and powdered.   DIET: Regular. Poor appetite. Need encouragement.    BOWEL/BLADDER:  Incont of urine and bowels.   ABNL LAB/BG: NA  DRAIN/DEVICES: PIV SL WDL   SKIN: Pale, dry and flaky. Blanchable redness to coccyx, perineum and buttock. BLE patty, refuses to elevated on pillows at times. Redness/excoriation to abdominal folds, armpits and under breasts. Scheduled Miconazole powder, and PRN. R waist crease dressing changed. On air mattress.   TESTS/PROCEDURES: NA  D/C DATE: Pending LTC placement, SW is following.   Discharge Barriers: Placement

## 2022-10-24 NOTE — PROGRESS NOTES
[facility]    Medicine Progress Note - Hospitalist Service    Date of Admission:  7/13/2022    Assessment & Plan   Cristy Bailey is a 77 year old female with a PMHx significant for morbid obesity; HTN; and HLD; who presented to [facility] 7/13/2022 with complete left-sided paralysis, left-sided facial droop, and difficulty speaking. CTA head remarkable for right MCA occlusion. She was given tenecteplase and subsequently transferred to [facility] 7/13/2022 for thrombectomy.      Hospital stay complicated by development and progression of flank wound.  This subsequently required surgical debridement and placement of wound vac (8/2) that was subsequently removed (8/22).      Prolonged hospitalization related todifficulties finding LTC placement.        Acute R MCA ischemic stroke with edema and right to left midline shift - s/p tenecteplase and subsequent R MCA mechanical thrombectomy 7/13/22  * Presented to [facility] 7/13 with complete paralysis, left-sided facial droop, and aphasia. Code stroke initiated. Head CT 7/13 showed signs of an evolving R MCA infarct. CTA head 7/13 showed a right carotid terminus occlusion, right middle cerebral artery M1 segment occlusion with poor opacification of the more distal right MCA branches/poor collateral flow. CTA neck 7/13 negative for acute occlusions. Pt given tenecteplase 7/13 at 13:11. Noted to be agitated and was subsequently intubated given need for procedure.   * Subsequently transferred to [facility] 7/13/2022 where she underwent above procedure.   * Extubated 7/14. Head CT 7/14 showed evolution of acute infarct involving the R MCA distribution with increased swelling, cortical effacement, and new mild right-to-left midline shift; questionable hypoattenuation involving the bilateral occipital lobes which could be artifactual.  Echo 7/14 showed LVEF 50%, grade 1 diastolic dysfunction.   * Started ASA and atorvastatin per stroke team.    * Repeat head CT 7/15 showed evolving R MCA stroke with areas of edema, overall stable.  -- Continue  mg daily, atorvastatin 40 mg daily.  -- Goal SBP <140/90 and met   -- Monitored on telemetry for first several wks of hospital stay without abnl rhythm so will not order 30d cardiac event monitor at discharge per neurology recommendations (from 10/18)  -- Follow up with [facility] in 6-8 wks     Anxiety  Cognitive impairment with intermittent situational confusion  Metabolic encephalopathy, resolved  * On no psychiatric medications PTA.  * Pt with issues with anxiety and intermittent situational confusion this hospitalization.    * Seen by Psychiatry this hospitalization.  * Started on multiple medications this hospitalization including quetiapine, olanzapine, sertraline, mirtazapine, PRN hydroxyzine, PRN lorazepam.  * Medication regimen was adjusted/titrated. Mirtazapine, PRN lorazepam and PRN hydroxyzine subsequently stopped. See previous progress notes for more details.  * PRN ativan stopped on 08/27/22 as leading to increased confusion. Hydroxyzine stopped as well.   -- Continue aripiprazole 10 mg qpm; olanzapine 2.5 mg at bedtime; sertraline 50 mg daily  -- Continue PRN olanzapine 5 mg q6h     Lower back/R flank wound/abscess, suspect dt pressure type injury - s/p surgical debridement on 7/30/2022 and placement of wound vac on 8/2/2022 (subsequently removed 8/22/2022).  OA  Hx of bilateral hip replacements  Hx of chronic neck and back pain  *  Patient has chronic back pain, reports having it over the last 2 years. MRI L-spine in 2018 showed multilevel degenerative changes with mild central stenosis.  * Pt with significant back pain early on in hospital stay. Was requiring IV hydromorphone.  Dose had to be decreased dt somnolence.  * During this stay, patient was noted with 5-6cm by 2-3cm wound with swelling/fluid/blistering in a fold of her lower back, did not appear infected; this seemed to be the source of  her pain. Padded dressing placed and WOC RN ordered. Pain initially improved.   * Was started on a course of amoxicillin-clavulanate on 7/24.   * On 7/26, was re-evaluated by WOC RN. Wound appeared more erythematous and purulence expressed. Worsening with shear stress from lift. Procal <0.05, WBC WNL. Abx changed to IV clindamycin.  * Wound cultures obtained. On 7/28, wound grew proteus and Staph aureus (MRSA neg). US neg for abscess.   * Lost IV access on 7/28 so abx were changed to oral clindamycin and oral levaquin. IV access re-established but was continued on oral abx.  * General surgery consulted, ultimately underwent excisional debridement of R flank abscess in OR on 7/30. Intraop cultures showed polymicrobial growth with proteus mirabilis x2 strains (both pan-sensitive), corynebacterium striatum and enterococcus faecalis.  * Wound vac placed per general surgery on 8/2.  * ID consulted on 8/2 and abx narrowed to amoxicillin-clavulanate alone, completed an additional 5 days of treatment on 8/7.   * Wound vac removed 8/22.  -- Continue local wound cares per WOC RN, follows weekly  -- Continue PRN oxycodone; minimize opioids as able.  -- Continue regular repositioning  -- Management of other pressure injuries as below     Volume overload secondary to acute diastolic CHF exacerbation, resolved  Essential hypertension  * Not on/needing meds PTA.   * As above, echo this stay showed EF 50% with grade I diastolic dysfunction; RV not well visualized but appeared mild-moderately dilated with global systolic function probably mildly reduced.   * BPs were initially soft this stay and required IVFs. BPs improved.   * Subsequently developed volume overload with elevated BP's and diuresed with IV furosemide, subsequently transitioned to PO furosemide.  * Issues with recurrent hypokalemia on furosemide; noted often refusing potassium.  * Started on lisinopril, but subsequently stopped due to soft BP's on furosemide.  *  Furosemide increased and spironolactone added 9/3.  -- Continue furosemide 20 mg BID;   -- hold aldactone 12.5mg daily on 10/24, has not received a dose in the last 4 days due to soft bp and not meeting parameters  -- Monitor i/o's, daily wts     Severe malnutrition related to acute and chronic medical issues  Significant weight loss this hospitalization  * Nutritionist following. Appetite poor this stay, needing encouragement to take po.   * There was some discussion previously about needing feeding support, but noted that patient does not want to have a feeding tube.  * As of 10/19, has had 107 lb weight loss since admit (374 lbs on 7/14; 267 lbs 10/19); noted some contribution from diuresis for CHF.  -- Continue to encourage oral intake  -- Continue supplements  -- Patient does not want a feeding tube and her health care agent Leo agrees with this, consistent with previous wishes     Vaginal bleeding  Abnormal endometrial thickening on US  * Pt with vaginal bleeding intermittently this hospitalization.  * Pelvic US 10/8 showed abnormal endometrial thickening to 14 mm.   * Gyn consulted 10/9 and recommend outpatient evaluation.  * Hgb overall stable.  -- Follow-up with Gyn outpatient for endometrial sampling in the near future     Deep tissue pressure injury (DTPI) left medial thigh/groin region, hospital acquired, progressed to HAPI stage 2 pressure injury; related to purwick external catheter tubing (not the purwick device itself)  Pressure injuries, bilateral buttocks  * DTPI left medial thigh/groin region noted this hospitalization. WOC RN consulted.   * Pressure injuries in bilateral buttocks noted 9/2. Seen by WOC RN.  -- Continue local wound cares per WOC RN  -- Continue regular repositioning     Dyslipidemia  * FLP this stay showed tot cholest 163, HDL 47, LDL 91, .   * Started on atorvastatin 40 mg daily  -- Continue atorvastatin 40 mg daily     Chronic bilateral LE edema with chronic venous  stasis dermatitis and chronic skin changes  Hx of LLE cellulitis  * Was hospitalized in 11/2021 for sepsis dt to pneumonia and LLE cellulitis.   * During this stay, BLE noted to be patty and edematous, no unilateral leg swelling appreciated; LLE had patchy areas of erythema, no fluctuance, no painful areas with palpation; legs warm to touch. Lymphedema consulted.   -- Cont LE elevation, lymphedema wraps  -- Continue diuretic as above     Hypokalemia, hypophosphatemia.  * Potassium and phosphorus low at times this hospitalization. Potassium low related to diuretics. Treated with replacement.  - increased scheduled KCl to 20mEq BID since getting lasix BID  - discontinue electrolyte protocols to minimize frequent daily blood draws since have been stable over the last several days  - with change in KCl dosing, will check in AM     Intertriginous dermatitis  * Chronic and stable.  -- Continue clotrimazole powder     Constipation  -- Continue scheduled senna-docusate, polyethylene glycol, PRN bisacodyl     Suspected meningioma left parietal region, incidentally seen on admission CT on 7/13/2022  * Head CT 7/13 showed a calcified extra-axial mass overlying the left parietal region measuring 1.4 cm, potentially representing a meningioma.  -- Will need serial monitoring OP after discharge     Suspected sleep apnea  * O2 drop to mid 80s overnight 8/14. Briefly placed on 4LPM, which she then removed and then slept okay with sats in 90s thereafter.  -- Consider sleep study as outpatient     Morbid obesity with significant weight loss this hospitalization  * BMI 59 on admission.  * Significant weight loss this hospitalization as above (107 lbs as of 10/19). BMI 43.2 on 10/20.  -- Continue aggressive dietary and lifestyle modifications      Goals of care  Failure to thrive  * During this hospitalization, discussed concern about overall prognosis, with her eating 0-25% of her meals and nutrition recommendations for alternative  forms of nutrition; at that time, she had lost 70 lbs since admit (some of that was probably related to diuresis and some related to variable weights in hospital, but still significant).   * Patient and significant other/HCA Leo, reiterated her wishes for DNR/DNI and no feeding tubes.  * Palliative care met with Leo 9/28 and he did not want hospice.  -- Continue restorative cares  -- Pt is DNR/DNI, no tube feedings     DISPOSITION  -- Plan LTC placement once facility available        OTHER RESOLVED/CHRONIC ISSUES:  Urinary retention, resolved  * Retaining urine on 8/14. UA WNL, not worrisome for infection.  * Requiring straight cath x1 on 8/20 PM.  * Subsequently good UOP, no longer requiring straight cath. No evidence of obstruction on PVR.      Dysphagia due to CVA, resolved  * Seen by SLP and noted with dysphagia. Initially required some intermittent fluid boluses.   * Was eventually able to advance to regular diet w/thin liquids.     Prolonged QTc.  * EKG on 7/13 showed QTc 494.   * Repeat EKG on 7/30 (while on levofloxacin) showed QTc stable at 475. Has since completed course of abx as below.  * Had been monitoring on telemetry this stay. No concerning findings so tele was ultimately dc'd 8/19.     Anemia, suspect dilutional component, resolved  * Hgb normal on admit. Hgb 11.5 on 7/16. No overt clinical signs of major bleeding.  * Hgb subsequently stable and later normalized.       Diet: Advance Diet as Tolerated  Regular Diet Adult Thin Liquids (level 0) (Upright position, alert, and assist as needed)  Room Service    DVT Prophylaxis: Enoxaparin (Lovenox) SQ  [place] Catheter: Not present  Central Lines: None  Cardiac Monitoring: None  Code Status: No CPR- Do NOT Intubate      Disposition Plan      Expected Discharge Date: 10/31/2022,  3:00 PM  Discharge Delays: Placement - LTC  *Medically Ready for Discharge    Discharge Comments: Multiple referrerals out  Did receive her 2nd covid shot  LTC placement       "  The patient's care was discussed with the Bedside Nurse and Patient.    Lola Arias MD  Hospitalist Service  [facility]  Securely message with the Vocera Web Console (learn more here)  Text page via [employer] Paging/Directory         Clinically Significant Risk Factors                        # Severe Malnutrition: based on nutrition assessment        ______________________________________________________________________    Interval History   Upon entering the room patient calling out \"help me.\"  When asked further, she reports she wants her feet covered because she feels cold. She otherwise denies chest pain, shortness of breath, nausea or vomiting. Feels comfortable once feet is covered with blanket.     Data reviewed today: I reviewed all medications, new labs and imaging results over the last 24 hours. I personally reviewed no images or EKG's today.    Physical Exam   Vital Signs: Temp: 97.3  F (36.3  C) Temp src: Oral BP: 98/72 Pulse: 71   Resp: 16 SpO2: 95 % O2 Device: None (Room air)    Weight: 267 lbs 10.22 oz  General Appearance: Alert, awake and no apparent distress  Respiratory: CTAB, no wheezing  Cardiovascular: regular rate and rhythm  GI: soft and non-tender  Ext: feet is warm, +pulses, wiggling her toes without problem/or pain    Data   Recent Labs   Lab 10/24/22  0752 10/23/22  0759 10/22/22  0911 10/21/22  0900    252 263 226   POTASSIUM 3.5  --  3.8 3.9     Medications     - MEDICATION INSTRUCTIONS -         ARIPiprazole  10 mg Oral QPM     aspirin  325 mg Oral Daily     atorvastatin  40 mg Oral QPM     enoxaparin ANTICOAGULANT  40 mg Subcutaneous Q12H     furosemide  20 mg Oral BID     miconazole   Topical BID     multivitamin w/minerals  1 tablet Oral Daily     OLANZapine  2.5 mg Oral At Bedtime     polyethylene glycol  17 g Oral Daily     potassium chloride  20 mEq Oral BID     senna-docusate  1-2 tablet Oral BID     sertraline  50 mg Oral Daily     sodium " chloride (PF)  3 mL Intracatheter Q8H     [Held by provider] spironolactone  12.5 mg Oral Daily

## 2022-10-25 NOTE — PLAN OF CARE
Goal Outcome Evaluation:                      DATE & TIME:10/24/22-10/25/22 [id number]  Cognitive Concerns/ Orientation: Cooperative. Anxious. Calls out occasionally. Using call light at times.    BEHAVIOR & AGGRESSION TOOL COLOR: Green     ABNL VS/O2: VSS on RA, BP soft at times.    MOBILITY: Ax2 with lift for transfer. T/R q2hr. Refused to get out of the bed.  PAIN MANAGMENT: Denies  DIET: Regular. Poor appetite. Need encouragement.    BOWEL/BLADDER: Incont of urine, no BM this shift,  ABNL LAB/BG: NA  DRAIN/DEVICES: PIV/SL   SKIN: Pale, dry and flaky. Blanchable redness to coccyx, perineum and buttock. BLE patty, refuses to elevated on pillows at times. Redness/excoriation to abdominal folds, armpits and under breasts. Scheduled Miconazole powder, and PRN. R Flank  dressing CDI. Large purple bruise to R. Upper arm. On air mattress.   TESTS/PROCEDURES: NA  D/C DATE: Pending LTC placement, SW is following.   Discharge Barriers: Placement ,   OTHER: discontinued K and Mg protocols, on scheduled Potassium BID.

## 2022-10-25 NOTE — PLAN OF CARE
Goal Outcome Evaluation:      Plan of Care Reviewed With: patient    DATE & TIME:10/24/2022, [id number]   Cognitive Concerns/ Orientation: Cooperative. Anxious. Calls out occasionally. Using call light at times.    BEHAVIOR & AGGRESSION TOOL COLOR: Green     ABNL VS/O2: VSS on RA, BP soft at times.    MOBILITY: A of 2 with lift for transfer. T/R q2hr. Refused to get out of the bed.  PAIN MANAGMENT: reporting mild pain to bilateral feet, repositioning provided with relief  DIET: Regular. Poor appetite. Need encouragement.    BOWEL/BLADDER: Incont of urine, no BM this shift,  ABNL LAB/BG: NA  DRAIN/DEVICES: PIV/SL   SKIN: Pale, dry and flaky. Blanchable redness to coccyx, perineum and buttock. BLE patty, refuses to elevated on pillows at times. Redness/excoriation to abdominal folds, armpits and under breasts. Scheduled Miconazole powder, and PRN. R Flank  dressing changed on day shift. Large purple bruise to R. Upper arm. On air mattress.   TESTS/PROCEDURES: NA  D/C DATE: Pending LTC placement, SW is following.   Discharge Barriers: Placement ,   OTHER: discontinued K and Mg protocols, on scheduled Potassium BID.

## 2022-10-25 NOTE — PLAN OF CARE
DATE & TIME:10/25/22 [id number]  Cognitive Concerns/ Orientation: A&Ox3 disoriented to time. Cooperative, but anxious at times. Calls out occasionally.  BEHAVIOR & AGGRESSION TOOL COLOR: Green     ABNL VS/O2: VSS on RA, BP soft at times.    MOBILITY: Ax2 with lift for transfer. T/R q2hr. Refused to get out of the bed.  PAIN MANAGMENT: Oxy given x1 for dressing change  DIET: Regular. Poor appetite. Needs encouragement.    BOWEL/BLADDER: Incont of urine, no BM this shift,  ABNL LAB/BG: NA  DRAIN/DEVICES: PIV/SL   SKIN: Pale, dry and flaky. Blanchable redness to coccyx, perineum and buttock. BLE patty, refuses to elevated on pillows at times. Redness/excoriation to abdominal folds, armpits and under breasts. Scheduled  Miconazole powder applied, and R Flank  dressing changed today x1. Large purple bruise to R. Upper arm. On air mattress.   TESTS/PROCEDURES: NA  D/C DATE: Pending LTC placement, SW is following.   Discharge Barriers: Placement ,   OTHER: discontinued K and Mg protocols, on scheduled Potassium BID.

## 2022-10-25 NOTE — PROGRESS NOTES
[facility]    Medicine Progress Note - Hospitalist Service    Date of Admission:  7/13/2022    Assessment & Plan   Cristy Bailey is a 77 year old female with a PMHx significant for morbid obesity; HTN; and HLD; who presented to [facility] 7/13/2022 with complete left-sided paralysis, left-sided facial droop, and difficulty speaking. CTA head remarkable for right MCA occlusion. She was given tenecteplase and subsequently transferred to [facility] 7/13/2022 for thrombectomy.      Hospital stay complicated by development and progression of flank wound.  This subsequently required surgical debridement and placement of wound vac (8/2) that was subsequently removed (8/22).      Prolonged hospitalization related todifficulties finding LTC placement.        Acute R MCA ischemic stroke with edema and right to left midline shift - s/p tenecteplase and subsequent R MCA mechanical thrombectomy 7/13/22  * Presented to [facility] 7/13 with complete paralysis, left-sided facial droop, and aphasia. Code stroke initiated. Head CT 7/13 showed signs of an evolving R MCA infarct. CTA head 7/13 showed a right carotid terminus occlusion, right middle cerebral artery M1 segment occlusion with poor opacification of the more distal right MCA branches/poor collateral flow. CTA neck 7/13 negative for acute occlusions. Pt given tenecteplase 7/13 at 13:11. Noted to be agitated and was subsequently intubated given need for procedure.   * Subsequently transferred to [facility] 7/13/2022 where she underwent above procedure.   * Extubated 7/14. Head CT 7/14 showed evolution of acute infarct involving the R MCA distribution with increased swelling, cortical effacement, and new mild right-to-left midline shift; questionable hypoattenuation involving the bilateral occipital lobes which could be artifactual.  Echo 7/14 showed LVEF 50%, grade 1 diastolic dysfunction.   * Started ASA and atorvastatin per stroke team.    * Repeat head CT 7/15 showed evolving R MCA stroke with areas of edema, overall stable.  -- Continue  mg daily, atorvastatin 40 mg daily.  -- Goal SBP <140/90 and met   -- Monitored on telemetry for first several wks of hospital stay without abnl rhythm so will not order 30d cardiac event monitor at discharge per neurology recommendations (from 10/18)  -- Follow up with [facility] in 6-8 wks     Anxiety  Cognitive impairment with intermittent situational confusion  Metabolic encephalopathy, resolved  * On no psychiatric medications PTA.  * Pt with issues with anxiety and intermittent situational confusion this hospitalization.    * Seen by Psychiatry this hospitalization.  * Started on multiple medications this hospitalization including quetiapine, olanzapine, sertraline, mirtazapine, PRN hydroxyzine, PRN lorazepam.  * Medication regimen was adjusted/titrated. Mirtazapine, PRN lorazepam and PRN hydroxyzine subsequently stopped. See previous progress notes for more details.  * PRN ativan stopped on 08/27/22 as leading to increased confusion. Hydroxyzine stopped as well.   -- Continue aripiprazole 10 mg qpm; olanzapine 2.5 mg at bedtime; sertraline 50 mg daily  -- Continue PRN olanzapine 5 mg q6h     Lower back/R flank wound/abscess, suspect dt pressure type injury - s/p surgical debridement on 7/30/2022 and placement of wound vac on 8/2/2022 (subsequently removed 8/22/2022).  OA  Hx of bilateral hip replacements  Hx of chronic neck and back pain  *  Patient has chronic back pain, reports having it over the last 2 years. MRI L-spine in 2018 showed multilevel degenerative changes with mild central stenosis.  * Pt with significant back pain early on in hospital stay. Was requiring IV hydromorphone.  Dose had to be decreased dt somnolence.  * During this stay, patient was noted with 5-6cm by 2-3cm wound with swelling/fluid/blistering in a fold of her lower back, did not appear infected; this seemed to be the source of  her pain. Padded dressing placed and WOC RN ordered. Pain initially improved.   * Was started on a course of amoxicillin-clavulanate on 7/24.   * On 7/26, was re-evaluated by WOC RN. Wound appeared more erythematous and purulence expressed. Worsening with shear stress from lift. Procal <0.05, WBC WNL. Abx changed to IV clindamycin.  * Wound cultures obtained. On 7/28, wound grew proteus and Staph aureus (MRSA neg). US neg for abscess.   * Lost IV access on 7/28 so abx were changed to oral clindamycin and oral levaquin. IV access re-established but was continued on oral abx.  * General surgery consulted, ultimately underwent excisional debridement of R flank abscess in OR on 7/30. Intraop cultures showed polymicrobial growth with proteus mirabilis x2 strains (both pan-sensitive), corynebacterium striatum and enterococcus faecalis.  * Wound vac placed per general surgery on 8/2.  * ID consulted on 8/2 and abx narrowed to amoxicillin-clavulanate alone, completed an additional 5 days of treatment on 8/7.   * Wound vac removed 8/22.  -- Continue local wound cares per WOC RN, follows weekly  -- Continue PRN oxycodone; minimize opioids as able.  -- Continue regular repositioning  -- Management of other pressure injuries as below     Volume overload secondary to acute diastolic CHF exacerbation, resolved  Essential hypertension  * Not on/needing meds PTA.   * As above, echo this stay showed EF 50% with grade I diastolic dysfunction; RV not well visualized but appeared mild-moderately dilated with global systolic function probably mildly reduced.   * BPs were initially soft this stay and required IVFs. BPs improved.   * Subsequently developed volume overload with elevated BP's and diuresed with IV furosemide, subsequently transitioned to PO furosemide.  * Issues with recurrent hypokalemia on furosemide; noted often refusing potassium.  * Started on lisinopril, but subsequently stopped due to soft BP's on furosemide.  *  Furosemide increased and spironolactone added 9/3.  -- Continue furosemide 20 mg BID;   -- hold aldactone 12.5mg daily on 10/24, has not received a dose in the last 4 days due to soft bp and not meeting parameters, plan to re-introduce in a day or 2 if bp allows  -- Monitor i/o's, daily wts     Severe malnutrition related to acute and chronic medical issues  Significant weight loss this hospitalization  * Nutritionist following. Appetite poor this stay, needing encouragement to take po.   * There was some discussion previously about needing feeding support, but noted that patient does not want to have a feeding tube.  * As of 10/19, has had 107 lb weight loss since admit (374 lbs on 7/14; 267 lbs 10/19); noted some contribution from diuresis for CHF.  -- Continue to encourage oral intake  -- Continue supplements  -- Patient does not want a feeding tube and her health care agent Leo agrees with this, consistent with previous wishes     Vaginal bleeding  Abnormal endometrial thickening on US  * Pt with vaginal bleeding intermittently this hospitalization.  * Pelvic US 10/8 showed abnormal endometrial thickening to 14 mm.   * Gyn consulted 10/9 and recommend outpatient evaluation.  * Hgb overall stable.  -- Follow-up with Gyn outpatient for endometrial sampling in the near future     Deep tissue pressure injury (DTPI) left medial thigh/groin region, hospital acquired, progressed to HAPI stage 2 pressure injury; related to purwick external catheter tubing (not the purwick device itself)  Pressure injuries, bilateral buttocks  * DTPI left medial thigh/groin region noted this hospitalization. WOC RN consulted.   * Pressure injuries in bilateral buttocks noted 9/2. Seen by WOC RN.  -- Continue local wound cares per WOC RN  -- Continue regular repositioning     Dyslipidemia  * FLP this stay showed tot cholest 163, HDL 47, LDL 91, .   * Started on atorvastatin 40 mg daily  -- Continue atorvastatin 40 mg  daily     Chronic bilateral LE edema with chronic venous stasis dermatitis and chronic skin changes  Hx of LLE cellulitis  * Was hospitalized in 11/2021 for sepsis dt to pneumonia and LLE cellulitis.   * During this stay, BLE noted to be patty and edematous, no unilateral leg swelling appreciated; LLE had patchy areas of erythema, no fluctuance, no painful areas with palpation; legs warm to touch. Lymphedema consulted.   -- Cont LE elevation, lymphedema wraps  -- Continue diuretic as above     Hypokalemia, hypophosphatemia.  * Potassium and phosphorus low at times this hospitalization. Potassium low related to diuretics. Treated with replacement.  - increased scheduled KCl to 20mEq BID since getting lasix BID--> changed to solution from pills since the pills are difficult for her to swallo  - discontinue electrolyte protocols on 10/24 to minimize frequent daily blood draws since have been stable over the last several days  - K this morning is 3.5, will check again tomorrow to ensure stays stable and adjust k supplement as indicated     Intertriginous dermatitis  * Chronic and stable.  -- Continue clotrimazole powder     Constipation  -- Continue scheduled senna-docusate, polyethylene glycol, PRN bisacodyl     Suspected meningioma left parietal region, incidentally seen on admission CT on 7/13/2022  * Head CT 7/13 showed a calcified extra-axial mass overlying the left parietal region measuring 1.4 cm, potentially representing a meningioma.  -- Will need serial monitoring OP after discharge     Suspected sleep apnea  * O2 drop to mid 80s overnight 8/14. Briefly placed on 4LPM, which she then removed and then slept okay with sats in 90s thereafter.  -- Consider sleep study as outpatient     Morbid obesity with significant weight loss this hospitalization  * BMI 59 on admission.  * Significant weight loss this hospitalization as above (107 lbs as of 10/19). BMI 43.2 on 10/20.  -- Continue aggressive dietary and  lifestyle modifications      Goals of care  Failure to thrive  * During this hospitalization, discussed concern about overall prognosis, with her eating 0-25% of her meals and nutrition recommendations for alternative forms of nutrition; at that time, she had lost 70 lbs since admit (some of that was probably related to diuresis and some related to variable weights in hospital, but still significant).   * Patient and significant other/HCA Leo, reiterated her wishes for DNR/DNI and no feeding tubes.  * Palliative care met with Leo 9/28 and he did not want hospice.  -- Continue restorative cares  -- Pt is DNR/DNI, no tube feedings     DISPOSITION  -- Plan LTC placement once facility available        OTHER RESOLVED/CHRONIC ISSUES:  Urinary retention, resolved  * Retaining urine on 8/14. UA WNL, not worrisome for infection.  * Requiring straight cath x1 on 8/20 PM.  * Subsequently good UOP, no longer requiring straight cath. No evidence of obstruction on PVR.      Dysphagia due to CVA, resolved  * Seen by SLP and noted with dysphagia. Initially required some intermittent fluid boluses.   * Was eventually able to advance to regular diet w/thin liquids.     Prolonged QTc.  * EKG on 7/13 showed QTc 494.   * Repeat EKG on 7/30 (while on levofloxacin) showed QTc stable at 475. Has since completed course of abx as below.  * Had been monitoring on telemetry this stay. No concerning findings so tele was ultimately dc'd 8/19.     Anemia, suspect dilutional component, resolved  * Hgb normal on admit. Hgb 11.5 on 7/16. No overt clinical signs of major bleeding.  * Hgb subsequently stable and later normalized.       Diet: Advance Diet as Tolerated  Regular Diet Adult Thin Liquids (level 0) (Upright position, alert, and assist as needed)  Room Service    DVT Prophylaxis: Enoxaparin (Lovenox) SQ  [place] Catheter: Not present  Central Lines: None  Cardiac Monitoring: None  Code Status: No CPR- Do NOT Intubate      Disposition Plan       Expected Discharge Date: 10/26/2022,  3:00 PM  Discharge Delays: Placement - LTC  *Medically Ready for Discharge    Discharge Comments: Multiple referrerals out  Did receive her 2nd covid shot  LTC placement        The patient's care was discussed with the Bedside Nurse, Patient and Patient's Family.    Lola Arias MD  Hospitalist Service  [facility]  Securely message with the Vocera Web Console (learn more here)  Text page via [employer] Paging/Directory         Clinically Significant Risk Factors                        # Severe Malnutrition: based on nutrition assessment        ______________________________________________________________________    Interval History   Patient's friend Leo at bedside during visit. Patient is happy about going around the nursing station today with a wheelchair. She denies n/v/abd pain, chest pain or shortness of breath.     Data reviewed today: I reviewed all medications, new labs and imaging results over the last 24 hours. I personally reviewed no images or EKG's today.    Physical Exam   Vital Signs: Temp: 97.3  F (36.3  C) Temp src: Oral BP: 105/56 Pulse: 70   Resp: 18 SpO2: 94 % O2 Device: None (Room air)    Weight: 264 lbs 0 oz  General Appearance: Alert, awake and no apparent distress  Respiratory: CTAB, no wheezing  Cardiovascular: regular rate and rhythm  GI: soft and non-tender  Skin: warm and dry    Data   Recent Labs   Lab 10/25/22  0945 10/24/22  0752 10/23/22  0759 10/22/22  0911    266 252 263   NA  --  136  --   --    POTASSIUM 3.4 3.6  3.5  --  3.8   CHLORIDE  --  103  --   --    CO2  --  26  --   --    BUN  --  16  --   --    CR  --  0.76  --   --    ANIONGAP  --  7  --   --    ASHIA  --  8.6  --   --    GLC  --  87  --   --      Medications     - MEDICATION INSTRUCTIONS -         ARIPiprazole  10 mg Oral QPM     aspirin  325 mg Oral Daily     atorvastatin  40 mg Oral QPM     enoxaparin ANTICOAGULANT  40 mg Subcutaneous  Q12H     furosemide  20 mg Oral BID     miconazole   Topical BID     multivitamin w/minerals  1 tablet Oral Daily     OLANZapine  2.5 mg Oral At Bedtime     polyethylene glycol  17 g Oral Daily     potassium chloride  20 mEq Oral BID     senna-docusate  1-2 tablet Oral BID     sertraline  50 mg Oral Daily     sodium chloride (PF)  3 mL Intracatheter Q8H     [Held by provider] spironolactone  12.5 mg Oral Daily

## 2022-10-26 NOTE — PLAN OF CARE
DATE & TIME:10/26/22 3338-0303  Cognitive Concerns/ Orientation: A&Ox3 disoriented to time. Cooperative, but anxious at times. Calls out occasionally.  BEHAVIOR & AGGRESSION TOOL COLOR: Green     ABNL VS/O2: VSS on RA, BP soft at times.    MOBILITY: Ax2 with lift for transfer. T/R q2hr. Refused to get out of the bed.  PAIN MANAGMENT: LE pain, refused medication. Rest promoted  DIET: Regular. Poor appetite. Needs encouragement.    BOWEL/BLADDER: Incont of urine, medium formed BM today  ABNL LAB/BG: NA  DRAIN/DEVICES: PIV/SL   SKIN: Pale, dry and flaky. Blanchable redness to coccyx, perineum and buttock. BLE patty, refuses to elevated on pillows at times. Redness/excoriation to abdominal folds, armpits and under breasts. Scheduled Miconazole powder applied, and PRN. R Flank  dressing cleaned and changed x1. Large purple bruise to R. Upper arm. On air mattress.   TESTS/PROCEDURES: NA  D/C DATE: Pending LTC placement, SW is following. Oscar gaytan currently assessing, referrals sent to [facility] in [place] today.  Discharge Barriers: Placement ,   OTHER: discontinued K and Mg protocols, on scheduled Potassium BID.

## 2022-10-26 NOTE — PROGRESS NOTES
[facility]    Medicine Progress Note - Hospitalist Service    Date of Admission:  7/13/2022    Assessment & Plan   Cristy Bailey is a 77 year old female with a PMHx significant for morbid obesity; HTN; and HLD; who presented to [facility] 7/13/2022 with complete left-sided paralysis, left-sided facial droop, and difficulty speaking. CTA head remarkable for right MCA occlusion. She was given tenecteplase and subsequently transferred to [facility] 7/13/2022 for thrombectomy.      Hospital stay complicated by development and progression of flank wound.  This subsequently required surgical debridement and placement of wound vac (8/2) that was subsequently removed (8/22).      Prolonged hospitalization related todifficulties finding LTC placement.     Acute R MCA ischemic stroke with edema and right to left midline shift - s/p tenecteplase and subsequent R MCA mechanical thrombectomy 7/13/22  * Presented to [facility] 7/13 with complete paralysis, left-sided facial droop, and aphasia. Code stroke initiated. Head CT 7/13 showed signs of an evolving R MCA infarct. CTA head 7/13 showed a right carotid terminus occlusion, right middle cerebral artery M1 segment occlusion with poor opacification of the more distal right MCA branches/poor collateral flow. CTA neck 7/13 negative for acute occlusions. Pt given tenecteplase 7/13 at 13:11. Noted to be agitated and was subsequently intubated given need for procedure.   * Subsequently transferred to [facility] 7/13/2022 where she underwent above procedure.   * Extubated 7/14. Head CT 7/14 showed evolution of acute infarct involving the R MCA distribution with increased swelling, cortical effacement, and new mild right-to-left midline shift; questionable hypoattenuation involving the bilateral occipital lobes which could be artifactual.  Echo 7/14 showed LVEF 50%, grade 1 diastolic dysfunction.   * Started ASA and atorvastatin per stroke team.   *  Repeat head CT 7/15 showed evolving R MCA stroke with areas of edema, overall stable.  -- Continue  mg daily, atorvastatin 40 mg daily.  -- Goal SBP <140/90 and met   -- Monitored on telemetry for first several wks of hospital stay without abnl rhythm so will not order 30d cardiac event monitor at discharge per neurology recommendations (from 10/18)  -- Follow up with [facility] in 6-8 wks     Anxiety  Cognitive impairment with intermittent situational confusion  Metabolic encephalopathy, resolved  * On no psychiatric medications PTA.  * Pt with issues with anxiety and intermittent situational confusion this hospitalization.    * Seen by Psychiatry this hospitalization.  * Started on multiple medications this hospitalization including quetiapine, olanzapine, sertraline, mirtazapine, PRN hydroxyzine, PRN lorazepam.  * Medication regimen was adjusted/titrated. Mirtazapine, PRN lorazepam and PRN hydroxyzine subsequently stopped. See previous progress notes for more details.  * PRN ativan stopped on 08/27/22 as leading to increased confusion. Hydroxyzine stopped as well.   -- Continue aripiprazole 10 mg qpm; olanzapine 2.5 mg at bedtime; sertraline 50 mg daily  -- Continue PRN olanzapine 5 mg q6h     Lower back/R flank wound/abscess, suspect dt pressure type injury - s/p surgical debridement on 7/30/2022 and placement of wound vac on 8/2/2022 (subsequently removed 8/22/2022).  OA  Hx of bilateral hip replacements  Hx of chronic neck and back pain  *  Patient has chronic back pain, reports having it over the last 2 years. MRI L-spine in 2018 showed multilevel degenerative changes with mild central stenosis.  * Pt with significant back pain early on in hospital stay. Was requiring IV hydromorphone.  Dose had to be decreased dt somnolence.  * During this stay, patient was noted with 5-6cm by 2-3cm wound with swelling/fluid/blistering in a fold of her lower back, did not appear infected; this seemed to be the source of her  pain. Padded dressing placed and WOC RN ordered. Pain initially improved.   * Was started on a course of amoxicillin-clavulanate on 7/24.   * On 7/26, was re-evaluated by WOC RN. Wound appeared more erythematous and purulence expressed. Worsening with shear stress from lift. Procal <0.05, WBC WNL. Abx changed to IV clindamycin.  * Wound cultures obtained. On 7/28, wound grew proteus and Staph aureus (MRSA neg). US neg for abscess.   * Lost IV access on 7/28 so abx were changed to oral clindamycin and oral levaquin. IV access re-established but was continued on oral abx.  * General surgery consulted, ultimately underwent excisional debridement of R flank abscess in OR on 7/30. Intraop cultures showed polymicrobial growth with proteus mirabilis x2 strains (both pan-sensitive), corynebacterium striatum and enterococcus faecalis.  * Wound vac placed per general surgery on 8/2.  * ID consulted on 8/2 and abx narrowed to amoxicillin-clavulanate alone, completed an additional 5 days of treatment on 8/7.   * Wound vac removed 8/22.  -- Continue local wound cares per WOC RN, follows weekly  -- Continue PRN oxycodone; minimize opioids as able.  -- Continue regular repositioning  -- Management of other pressure injuries as below     Volume overload secondary to acute diastolic CHF exacerbation, resolved  Essential hypertension  * Not on/needing meds PTA.   * As above, echo this stay showed EF 50% with grade I diastolic dysfunction; RV not well visualized but appeared mild-moderately dilated with global systolic function probably mildly reduced.   * BPs were initially soft this stay and required IVFs. BPs improved.   * Subsequently developed volume overload with elevated BP's and diuresed with IV furosemide, subsequently transitioned to PO furosemide.  * Issues with recurrent hypokalemia on furosemide; noted often refusing potassium.  * Started on lisinopril, but subsequently stopped due to soft BP's on furosemide.  *  Furosemide increased and spironolactone added 9/3.  -- Continue furosemide 20 mg BID;   -- has not been getting aldactone for few days because soft bp and parameters, resume aldactone 12.5mg daily with parameters  -- will need f/u potassium level in 2-3 days since she is on scheduled potassium 20mEq BID with lasix and aldactone  -- Monitor i/o's, daily wts     Severe malnutrition related to acute and chronic medical issues  Significant weight loss this hospitalization  * Nutritionist following. Appetite poor this stay, needing encouragement to take po.   * There was some discussion previously about needing feeding support, but noted that patient does not want to have a feeding tube.  * As of 10/19, has had 107 lb weight loss since admit (374 lbs on 7/14; 267 lbs 10/19); noted some contribution from diuresis for CHF.  -- Continue to encourage oral intake  -- Continue supplements  -- Patient does not want a feeding tube and her health care agent Leo agrees with this, consistent with previous wishes     Vaginal bleeding  Abnormal endometrial thickening on US  * Pt with vaginal bleeding intermittently this hospitalization.  * Pelvic US 10/8 showed abnormal endometrial thickening to 14 mm.   * Gyn consulted 10/9 and recommend outpatient evaluation.  * Hgb overall stable.  -- Follow-up with Gyn outpatient for endometrial sampling in the near future     Deep tissue pressure injury (DTPI) left medial thigh/groin region, hospital acquired, progressed to HAPI stage 2 pressure injury; related to purwick external catheter tubing (not the purwick device itself)  Pressure injuries, bilateral buttocks  * DTPI left medial thigh/groin region noted this hospitalization. WOC RN consulted.   * Pressure injuries in bilateral buttocks noted 9/2. Seen by WOC RN.  -- Continue local wound cares per WOC RN  -- Continue regular repositioning     Dyslipidemia  * FLP this stay showed tot cholest 163, HDL 47, LDL 91, .   * Started on  atorvastatin 40 mg daily  -- Continue atorvastatin 40 mg daily     Chronic bilateral LE edema with chronic venous stasis dermatitis and chronic skin changes  Hx of LLE cellulitis  * Was hospitalized in 11/2021 for sepsis dt to pneumonia and LLE cellulitis.   * During this stay, BLE noted to be patty and edematous, no unilateral leg swelling appreciated; LLE had patchy areas of erythema, no fluctuance, no painful areas with palpation; legs warm to touch. Lymphedema consulted.   -- Cont LE elevation, lymphedema wraps  -- Continue diuretic as above     Hypokalemia, hypophosphatemia.  * Potassium and phosphorus low at times this hospitalization. Potassium low related to diuretics. Treated with replacement.  - increased scheduled KCl to 20mEq BID on 10/24 since was not getting aldactone  - discontinue electrolyte protocols on 10/24 to minimize frequent daily blood draws since have been stable over the last several days  - K this morning is 3.5, will need f/u K in the next 2-3 days to ensure stable with adding back aldactone since K supplement has been increased to BID     Intertriginous dermatitis  * Chronic and stable.  -- Continue clotrimazole powder     Constipation  -- Continue scheduled senna-docusate, polyethylene glycol, PRN bisacodyl     Suspected meningioma left parietal region, incidentally seen on admission CT on 7/13/2022  * Head CT 7/13 showed a calcified extra-axial mass overlying the left parietal region measuring 1.4 cm, potentially representing a meningioma.  -- Will need serial monitoring OP after discharge     Suspected sleep apnea  * O2 drop to mid 80s overnight 8/14. Briefly placed on 4LPM, which she then removed and then slept okay with sats in 90s thereafter.  -- Consider sleep study as outpatient     Morbid obesity with significant weight loss this hospitalization  * BMI 59 on admission.  * Significant weight loss this hospitalization as above (107 lbs as of 10/19). BMI 43.2 on 10/20.  -- Continue  aggressive dietary and lifestyle modifications      Goals of care  Failure to thrive  * During this hospitalization, discussed concern about overall prognosis, with her eating 0-25% of her meals and nutrition recommendations for alternative forms of nutrition; at that time, she had lost 70 lbs since admit (some of that was probably related to diuresis and some related to variable weights in hospital, but still significant).   * Patient and significant other/HCA Leo, reiterated her wishes for DNR/DNI and no feeding tubes.  * Palliative care met with Leo 9/28 and he did not want hospice.  -- Continue restorative cares  -- Pt is DNR/DNI, no tube feedings     DISPOSITION  -- Plan LTC placement once facility available        OTHER RESOLVED/CHRONIC ISSUES:  Urinary retention, resolved  * Retaining urine on 8/14. UA WNL, not worrisome for infection.  * Requiring straight cath x1 on 8/20 PM.  * Subsequently good UOP, no longer requiring straight cath. No evidence of obstruction on PVR.      Dysphagia due to CVA, resolved  * Seen by SLP and noted with dysphagia. Initially required some intermittent fluid boluses.   * Was eventually able to advance to regular diet w/thin liquids.     Prolonged QTc.  * EKG on 7/13 showed QTc 494.   * Repeat EKG on 7/30 (while on levofloxacin) showed QTc stable at 475. Has since completed course of abx as below.  * Had been monitoring on telemetry this stay. No concerning findings so tele was ultimately dc'd 8/19.     Anemia, suspect dilutional component, resolved  * Hgb normal on admit. Hgb 11.5 on 7/16. No overt clinical signs of major bleeding.  * Hgb subsequently stable and later normalized.       Diet: Advance Diet as Tolerated  Regular Diet Adult Thin Liquids (level 0) (Upright position, alert, and assist as needed)  Room Service    DVT Prophylaxis: Enoxaparin (Lovenox) SQ  [place] Catheter: Not present  Central Lines: None  Cardiac Monitoring: None  Code Status: No CPR- Do NOT Intubate       Disposition Plan      Expected Discharge Date: 11/03/2022    Discharge Delays: Placement - LTC  *Medically Ready for Discharge    Discharge Comments: Multiple referrerals out  Did receive her 2nd covid shot  LTC placement        The patient's care was discussed with the Bedside Nurse and Patient.    Lola Arias MD  Hospitalist Service  [facility]  Securely message with the Vocera Web Console (learn more here)  Text page via [facility] Paging/Directory         Clinically Significant Risk Factors                        # Severe Malnutrition: based on nutrition assessment        ______________________________________________________________________    Interval History   Watching TV. Did not want to go out of bed today, but will try in the afternoon. Denies cp, sob, n/v or abdominal pain.     Data reviewed today: I reviewed all medications, new labs and imaging results over the last 24 hours. I personally reviewed no images or EKG's today.    Physical Exam   Vital Signs: Temp: 97.3  F (36.3  C) Temp src: Axillary BP: 106/68 Pulse: 80   Resp: 16 SpO2: 95 %      Weight: 264 lbs 0 oz  General Appearance: Alert, awake and no apparent distress  Respiratory: CTAB, no wheezing  Cardiovascular: regular rate and rhythm  GI: soft and non-tender  Skin: warm and dry    Data   Recent Labs   Lab 10/26/22  0943 10/25/22  0945 10/24/22  0752    295 266   NA  --   --  136   POTASSIUM 3.5 3.4 3.6  3.5   CHLORIDE  --   --  103   CO2  --   --  26   BUN  --   --  16   CR  --   --  0.76   ANIONGAP  --   --  7   ASHIA  --   --  8.6   GLC  --   --  87     Medications     - MEDICATION INSTRUCTIONS -         ARIPiprazole  10 mg Oral QPM     aspirin  325 mg Oral Daily     atorvastatin  40 mg Oral QPM     enoxaparin ANTICOAGULANT  40 mg Subcutaneous Q12H     furosemide  20 mg Oral BID     miconazole   Topical BID     multivitamin w/minerals  1 tablet Oral Daily     OLANZapine  2.5 mg Oral At Bedtime      polyethylene glycol  17 g Oral Daily     potassium chloride  20 mEq Oral BID     senna-docusate  1-2 tablet Oral BID     sertraline  50 mg Oral Daily     sodium chloride (PF)  3 mL Intracatheter Q8H     [Held by provider] spironolactone  12.5 mg Oral Daily

## 2022-10-26 NOTE — PLAN OF CARE
Goal Outcome Evaluation:                      DATE & TIME:10/25/22-10/26/22 [id number]  Cognitive Concerns/ Orientation: A&Ox3 disoriented to time. Cooperative, but anxious at times. Calls out occasionally.  BEHAVIOR & AGGRESSION TOOL COLOR: Green     ABNL VS/O2: VSS on RA, BP soft at times.    MOBILITY: Ax2 with lift for transfer. T/R q2hr. Refused to get out of the bed.  PAIN MANAGMENT: Denies  DIET: Regular. Poor appetite. Needs encouragement.    BOWEL/BLADDER: Incont of urine, large loose BM this shift  ABNL LAB/BG: NA  DRAIN/DEVICES: PIV/SL   SKIN: Pale, dry and flaky. Blanchable redness to coccyx, perineum and buttock. BLE patty, refuses to elevated on pillows at times. Redness/excoriation to abdominal folds, armpits and under breasts. Scheduled Miconazole powder, and PRN. R Flank  dressing CDI. Large purple bruise to R. Upper arm. On air mattress.   TESTS/PROCEDURES: NA  D/C DATE: Pending LTC placement, SW is following.   Discharge Barriers: Placement ,   OTHER: discontinued K and Mg protocols, on scheduled Potassium BID.

## 2022-10-26 NOTE — PROGRESS NOTES
Care Management Follow Up    Length of Stay (days): 105    Expected Discharge Date: 10/26/2022     Concerns to be Addressed:       Patient plan of care discussed at interdisciplinary rounds: Yes    Anticipated Discharge Disposition: Skilled Nursing Facility     Anticipated Discharge Services:    Anticipated Discharge DME:      Patient/family educated on Medicare website which has current facility and service quality ratings:    Education Provided on the Discharge Plan:    Patient/Family in Agreement with the Plan: yes    Referrals Placed by CM/SW:    Private pay costs discussed:     Additional Information:  Staff continue to make SNF referrals to facilities which have vacancies.  [facility] is assessing patient.   Today a referral was sent to [facility] in [place].       VADIM SandersonSW

## 2022-10-27 NOTE — PROGRESS NOTES
[facility]  Hospitalist Progress Note    Admit Date:  7/13/2022  Date of Service (when I saw the patient): 10/27/2022   Provider:  Eva Ching, DO    Assessment & Plan   Cristy Bailey is a 77 year old female with a PMHx significant for morbid obesity; HTN; and HLD; who presented to [facility] 7/13/2022 with complete left-sided paralysis, left-sided facial droop, and difficulty speaking. CTA head remarkable for right MCA occlusion. She was given tenecteplase and subsequently transferred to [facility] 7/13/2022 for thrombectomy.      Hospital stay complicated by development and progression of flank wound.  This subsequently required surgical debridement and placement of wound vac (8/2) that was subsequently removed (8/22).      Prolonged hospitalization related todifficulties finding LTC placement.    Problem List:    1. Acute R MCA ischemic stroke with edema and right to left midline shift - s/p tenecteplase and subsequent R MCA mechanical thrombectomy 7/13/22  * Presented to [facility] 7/13 with complete paralysis, left-sided facial droop, and aphasia. Code stroke initiated. Head CT 7/13 showed signs of an evolving R MCA infarct. CTA head 7/13 showed a right carotid terminus occlusion, right middle cerebral artery M1 segment occlusion with poor opacification of the more distal right MCA branches/poor collateral flow. CTA neck 7/13 negative for acute occlusions. Pt given tenecteplase 7/13 at 13:11. Noted to be agitated and was subsequently intubated given need for procedure.   * Subsequently transferred to [facility] 7/13/2022 where she underwent above procedure.   * Extubated 7/14. Head CT 7/14 showed evolution of acute infarct involving the R MCA distribution with increased swelling, cortical effacement, and new mild right-to-left midline shift; questionable hypoattenuation involving the bilateral occipital lobes which could be artifactual.  Echo 7/14 showed LVEF 50%,  grade 1 diastolic dysfunction.   * Started ASA and atorvastatin per stroke team.   * Repeat head CT 7/15 showed evolving R MCA stroke with areas of edema, overall stable.  -- Continue  mg daily, atorvastatin 40 mg daily.  -- Goal SBP <140/90 and met   -- Monitored on telemetry for first several wks of hospital stay without abnl rhythm so will not order 30d cardiac event monitor at discharge per neurology recommendations (from 10/18)  -- Follow up with [facility]CN in 6-8 wks     Neuro status stable    2. Anxiety  Cognitive impairment with intermittent situational confusion  Metabolic encephalopathy, resolved  * On no psychiatric medications PTA.  * Pt with issues with anxiety and intermittent situational confusion this hospitalization.    * Seen by Psychiatry this hospitalization.  * Started on multiple medications this hospitalization including quetiapine, olanzapine, sertraline, mirtazapine, PRN hydroxyzine, PRN lorazepam.  * Medication regimen was adjusted/titrated. Mirtazapine, PRN lorazepam and PRN hydroxyzine subsequently stopped. See previous progress notes for more details.  * PRN ativan stopped on 08/27/22 as leading to increased confusion. Hydroxyzine stopped as well.   -- Continue aripiprazole 10 mg qpm; olanzapine 2.5 mg at bedtime; sertraline 50 mg daily  -- Continue PRN olanzapine 5 mg q6h     3. Lower back/R flank wound/abscess, suspect dt pressure type injury - s/p surgical debridement on 7/30/2022 and placement of wound vac on 8/2/2022 (subsequently removed 8/22/2022).  OA  Hx of bilateral hip replacements  Hx of chronic neck and back pain  *  Patient has chronic back pain, reports having it over the last 2 years. MRI L-spine in 2018 showed multilevel degenerative changes with mild central stenosis.  * Pt with significant back pain early on in hospital stay. Was requiring IV hydromorphone.  Dose had to be decreased dt somnolence.  * During this stay, patient was noted with 5-6cm by 2-3cm wound with  swelling/fluid/blistering in a fold of her lower back, did not appear infected; this seemed to be the source of her pain. Padded dressing placed and WOC RN ordered. Pain initially improved.   * Was started on a course of amoxicillin-clavulanate on 7/24.   * On 7/26, was re-evaluated by WOC RN. Wound appeared more erythematous and purulence expressed. Worsening with shear stress from lift. Procal <0.05, WBC WNL. Abx changed to IV clindamycin.  * Wound cultures obtained. On 7/28, wound grew proteus and Staph aureus (MRSA neg). US neg for abscess.   * Lost IV access on 7/28 so abx were changed to oral clindamycin and oral levaquin. IV access re-established but was continued on oral abx.  * General surgery consulted, ultimately underwent excisional debridement of R flank abscess in OR on 7/30. Intraop cultures showed polymicrobial growth with proteus mirabilis x2 strains (both pan-sensitive), corynebacterium striatum and enterococcus faecalis.  * Wound vac placed per general surgery on 8/2.  * ID consulted on 8/2 and abx narrowed to amoxicillin-clavulanate alone, completed an additional 5 days of treatment on 8/7.   * Wound vac removed 8/22.  -- Continue local wound cares per WOC RN, follows weekly  -- Continue PRN oxycodone; minimize opioids as able.  -- Continue regular repositioning  -- Management of other pressure injuries as below     4. Volume overload secondary to acute diastolic CHF exacerbation, resolved  Essential hypertension  * Not on/needing meds PTA.   * As above, echo this stay showed EF 50% with grade I diastolic dysfunction; RV not well visualized but appeared mild-moderately dilated with global systolic function probably mildly reduced.   * BPs were initially soft this stay and required IVFs. BPs improved.   * Subsequently developed volume overload with elevated BP's and diuresed with IV furosemide, subsequently transitioned to PO furosemide.  * Issues with recurrent hypokalemia on furosemide; noted  often refusing potassium.  * Started on lisinopril, but subsequently stopped due to soft BP's on furosemide.  * Furosemide increased and spironolactone added 9/3.  -- Continue furosemide 20 mg BID;   -- has not been getting aldactone for few days because soft bp and parameters, resume aldactone 12.5mg daily with parameters  -- will need f/u potassium level in 2-3 days since she is on scheduled potassium 20mEq BID with lasix and aldactone    Potassium 3.5 10/26/22  -- Monitor i/o's, daily wts     5. Severe malnutrition related to acute and chronic medical issues  Significant weight loss this hospitalization  * Nutritionist following. Appetite poor this stay, needing encouragement to take po.   * There was some discussion previously about needing feeding support, but noted that patient does not want to have a feeding tube.  * As of 10/19, has had 107 lb weight loss since admit (374 lbs on 7/14; 267 lbs 10/19); noted some contribution from diuresis for CHF.  -- Continue to encourage oral intake  -- Continue supplements  -- Patient does not want a feeding tube and her health care agent Leo agrees with this, consistent with previous wishes     6. Vaginal bleeding  Abnormal endometrial thickening on US  * Pt with vaginal bleeding intermittently this hospitalization.  * Pelvic US 10/8 showed abnormal endometrial thickening to 14 mm.   * Gyn consulted 10/9 and recommend outpatient evaluation.  * Hgb overall stable.  -- Follow-up with Gyn outpatient for endometrial sampling in the near future     7. Deep tissue pressure injury (DTPI) left medial thigh/groin region, hospital acquired, progressed to HAPI stage 2 pressure injury; related to purwick external catheter tubing (not the purwick device itself)  Pressure injuries, bilateral buttocks  * DTPI left medial thigh/groin region noted this hospitalization. WOC RN consulted.   * Pressure injuries in bilateral buttocks noted 9/2. Seen by WOC RN.  -- Continue local wound cares  per WOC RN  -- Continue regular repositioning     8. Dyslipidemia  * FLP this stay showed tot cholest 163, HDL 47, LDL 91, .   * Started on atorvastatin 40 mg daily  -- Continue atorvastatin 40 mg daily     9. Chronic bilateral LE edema with chronic venous stasis dermatitis and chronic skin changes  Hx of LLE cellulitis  * Was hospitalized in 11/2021 for sepsis dt to pneumonia and LLE cellulitis.   * During this stay, BLE noted to be patty and edematous, no unilateral leg swelling appreciated; LLE had patchy areas of erythema, no fluctuance, no painful areas with palpation; legs warm to touch. Lymphedema consulted.   -- Cont LE elevation, lymphedema wraps  -- Continue diuretic (as above)     10. Hypokalemia, hypophosphatemia.  * Potassium and phosphorus low at times this hospitalization. Potassium low related to diuretics. Treated with replacement.  - increased scheduled KCl to 20mEq BID on 10/24 since was not getting aldactone  - discontinue electrolyte protocols on 10/24 to minimize frequent daily blood draws since have been stable over the last several days    11. Intertriginous dermatitis  * Chronic and stable.  -- Continue clotrimazole powder     12. Constipation  -- Continue scheduled senna-docusate, polyethylene glycol, PRN bisacodyl     13. Suspected meningioma left parietal region, incidentally seen on admission CT on 7/13/2022  * Head CT 7/13 showed a calcified extra-axial mass overlying the left parietal region measuring 1.4 cm, potentially representing a meningioma.  -- Will need serial monitoring OP after discharge     14. Suspected sleep apnea  * O2 drop to mid 80s overnight 8/14. Briefly placed on 4LPM, which she then removed and then slept okay with sats in 90s thereafter.  -- Consider sleep study as outpatient     15. Morbid obesity with significant weight loss this hospitalization  * BMI 59 on admission.  * Significant weight loss this hospitalization as above (107 lbs as of 10/19). BMI 43.2  on 10/20.  -- Continue aggressive dietary and lifestyle modifications      16. Goals of care       Failure to thrive  * During this hospitalization, discussed concern about overall prognosis, with her eating 0-25% of her meals and nutrition recommendations for alternative forms of nutrition; at that time, she had lost 70 lbs since admit (some of that was probably related to diuresis and some related to variable weights in hospital, but still significant).   * Patient and significant other/HCA Leo, reiterated her wishes for DNR/DNI and no feeding tubes.  * Palliative care met with Leo 9/28 and he did not want hospice.  -- Continue restorative cares  -- Pt is DNR/DNI, no tube feedings     DISPOSITION  -- Plan LTC placement once facility available        OTHER RESOLVED/CHRONIC ISSUES:  Urinary retention, resolved  * Retaining urine on 8/14. UA WNL, not worrisome for infection.  * Requiring straight cath x1 on 8/20 PM.  * Subsequently good UOP, no longer requiring straight cath. No evidence of obstruction on PVR.      Dysphagia due to CVA, resolved  * Seen by SLP and noted with dysphagia. Initially required some intermittent fluid boluses.   * Was eventually able to advance to regular diet w/thin liquids.     Prolonged QTc.  * EKG on 7/13 showed QTc 494.   * Repeat EKG on 7/30 (while on levofloxacin) showed QTc stable at 475. Has since completed course of abx as below.  * Had been monitoring on telemetry this stay. No concerning findings so tele was ultimately dc'd 8/19.     Anemia, suspect dilutional component, resolved  * Hgb normal on admit. Hgb 11.5 on 7/16. No overt clinical signs of major bleeding.  * Hgb subsequently stable and later normalized.     Expected Discharge Date: medically stable for discharge  Discharge Delays: Placement - LTC  *Medically Ready for Discharge    Discharge Comments: Multiple referrerals out  Did receive her 2nd covid shot  LTC placement     Diet: Advance Diet as Tolerated  Regular  "Diet Adult Thin Liquids (level 0) (Upright position, alert, and assist as needed)  Room Service    [place] Catheter: Not present  Code Status: No CPR- Do NOT Intubate      Entered: Eva Ching DO 10/27/2022, 7:25 AM       Interval History   \"I am feeling cold\".  Denies CP, SOB, F/C or N/V this am.  No new concerns per nursing overnight.      -Data reviewed today: I reviewed all new labs and imaging results over the last 24 hours. I personally reviewed no images or EKG's today.    Physical Exam   Temp: 97.1  F (36.2  C) Temp src: Oral BP: 104/59 Pulse: 75   Resp: 17 SpO2: 95 % O2 Device: None (Room air)    Vitals:    10/16/22 0520 10/19/22 0645 10/25/22 0500   Weight: 128.9 kg (284 lb 3.2 oz) 121.4 kg (267 lb 10.2 oz) 119.7 kg (264 lb)     Vital Signs with Ranges  Temp:  [97.1  F (36.2  C)-97.3  F (36.3  C)] 97.1  F (36.2  C)  Pulse:  [75-80] 75  Resp:  [16-17] 17  BP: (104-106)/(59-68) 104/59  SpO2:  [95 %] 95 %  I/O last 3 completed shifts:  In: 180 [P.O.:180]  Out: -     GEN:  Alert, oriented x 3, comfortable, no overt respiratory distress.  HEENT:  Normocephalic/atraumatic, no scleral icterus, no nasal discharge, mouth and membranes appear moist, no oral ulcers or thrush noted.  NECK:  No clear thyromegaly or JVD  CV:  Regular rate and rhythm, somewhat distant but no murmur to ausc.  S1 + S2 noted, no S3 or S4.  LUNGS:  Clear to auscultation ant/lat bilaterally.  No rales/rhonchi/wheezing auscultated bilaterally.  No costal retractions bilaterally.  Symmetric chest rise on inhalation noted.  ABD:  Active bowel sounds, soft, non-tender/not really distended.  No clear rebound/guarding/rigidity.  No masses palpated.  No obvious HSM to exam.  EXT:  Trace pitting pretibial edema noted bilaterally, no cyanosis bilaterally. No joint synovitis noted.  No calf-tenderness or asymmetry noted.  SKIN:  Dry to touch, no rashes or jaundice noted.  PSYCH:  Mood appropriate, Not tearful or depressed.  Maintains direct " eye contact.  NEURO:  No tremors at rest, speech is clear and appropriate.  CN 2-12 intact to gross testing bilaterally    Data   Labs:  Recent Labs   Lab 10/26/22  0943 10/25/22  0945 10/24/22  0752   NA  --   --  136   POTASSIUM 3.5 3.4 3.6  3.5   CHLORIDE  --   --  103   CO2  --   --  26   ANIONGAP  --   --  7   GLC  --   --  87   BUN  --   --  16   CR  --   --  0.76   GFRESTIMATED  --   --  81   ASHIA  --   --  8.6     Recent Labs   Lab 10/27/22  0900 10/26/22  0943 10/25/22  0945    262 295     Recent Labs   Lab 10/26/22  0943 10/25/22  0945 10/24/22  0752 10/22/22  0911 10/21/22  0900   NA  --   --  136  --   --    POTASSIUM 3.5 3.4 3.6  3.5 3.8 3.9   CHLORIDE  --   --  103  --   --    CO2  --   --  26  --   --    ANIONGAP  --   --  7  --   --    GLC  --   --  87  --   --    BUN  --   --  16  --   --    CR  --   --  0.76  --   --    GFRESTIMATED  --   --  81  --   --    ASHIA  --   --  8.6  --   --    MAG  --   --  1.9 1.9 1.9      Recent Imaging:   No results found for this or any previous visit (from the past 24 hour(s)).    Medications     - MEDICATION INSTRUCTIONS -         ARIPiprazole  10 mg Oral QPM     aspirin  325 mg Oral Daily     atorvastatin  40 mg Oral QPM     enoxaparin ANTICOAGULANT  40 mg Subcutaneous Q12H     furosemide  20 mg Oral BID     miconazole   Topical BID     multivitamin w/minerals  1 tablet Oral Daily     OLANZapine  2.5 mg Oral At Bedtime     polyethylene glycol  17 g Oral Daily     potassium chloride  20 mEq Oral BID     senna-docusate  1-2 tablet Oral BID     sertraline  50 mg Oral Daily     sodium chloride (PF)  3 mL Intracatheter Q8H     spironolactone  12.5 mg Oral Daily

## 2022-10-27 NOTE — PLAN OF CARE
DATE & TIME: 10/27/22 0633-7270  Cognitive Concerns/ Orientation: A&Ox3 disoriented to time. Cooperative, but can get anxious at times. Calls out occasionally remind to use call light  BEHAVIOR & AGGRESSION TOOL COLOR: Green     ABNL VS/O2: VSS on RA, BP soft at times.    MOBILITY: Ax2 with lift for transfer. T/R q2hr. Up in chair for an hour today.  PAIN MANAGMENT: LE pain/ numbness & tingling , refused medication.   DIET: Regular. Poor appetite. Needs encouragement.    BOWEL/BLADDER: Incont of urine, scant amount of blood, no BM today during day shift   ABNL LAB/BG:   DRAIN/DEVICES: PIV/SL   SKIN: Pale, dry and flaky. Blanchable redness to coccyx, perineum and buttock. BLE patty, Redness/excoriation to abdominal folds, armpits and under breasts. Scheduled Miconazole powder applied, and PRN. R Flank  dressing cleaned and changed x1. WOC also looked at wound today on R. Flank (see note).  Large purple bruise to R. Upper arm. On air mattress.   TESTS/PROCEDURES: NA  D/C DATE: Pending LTC placement, SW is following. Oscar gaytan currently assessing, referrals sent to [facility] in [place].  Discharge Barriers: Placement ,   OTHER: discontinued K and Mg protocols, on scheduled Potassium BID.

## 2022-10-27 NOTE — PLAN OF CARE
Goal Outcome Evaluation:                      DATE & TIME:10/26/22-10/27/22 2566-0919  Cognitive Concerns/ Orientation: A&Ox3 disoriented to time. Cooperative, but anxious at times. Calls out occasionally remind to use call light  BEHAVIOR & AGGRESSION TOOL COLOR: Green     ABNL VS/O2: VSS on RA, BP soft at times.    MOBILITY: Ax2 with lift for transfer. T/R q2hr. Up in chair for couple of hours   PAIN MANAGMENT: LE pain/ numbness & tingling , refused medication.   DIET: Regular. Poor appetite. Needs encouragement.    BOWEL/BLADDER: Incont of urine, small amount of blood, medium formed BM today during day shift   ABNL LAB/BG: NA  DRAIN/DEVICES: PIV/SL   SKIN: Pale, dry and flaky. Blanchable redness to coccyx, perineum and buttock. BLE patty, refuses to elevated on pillows at times. Redness/excoriation to abdominal folds, armpits and under breasts. Scheduled Miconazole powder applied, and PRN. R Flank  dressing cleaned and changed x1. Large purple bruise to R. Upper arm. On air mattress.   TESTS/PROCEDURES: NA  D/C DATE: Pending LTC placement, SW is following. Oscar gaytan currently assessing, referrals sent to [facility] in [place] today.  Discharge Barriers: Placement ,   OTHER: discontinued K and Mg protocols, on scheduled Potassium BID.

## 2022-10-27 NOTE — PROGRESS NOTES
"[facility] Nurse Inpatient Assessment     Consulted for: Right lower back wound (Stage 3 HAPI)     Areas Assessed:      Areas visualized during today's visit: right skin fold, left medial thigh      Wound location: Right lower back in waist crease    Photo date: 10-27-22        10-19-22      Wound due to: Hospital Acquired Pressure injury stage 3   Stage: Unstageable 7/28, Following surgical debridement 7/30 Stage 3 Pressure Injury and Moisture Associated Skin Damage (MASD), suspect intertriginous pressure, appears to be deeper tissue damage within a crease, possible shear component from lift.    Wound history/plan of care: previously a large deep wound, vac therapy was used for a few weeks and wound filled in well. Now near skin level and has been somewhat hypertrophic and friable. 10/27 improved slightly with use of Mesalt over past week, will continue this for another week and then re-eval  Wound base: red moist non granular tissue, slick and friable but healthier in appearance 10/27     Palpation of the wound bed: normal       Drainage: moderate      Description of drainage: serosanguinous       Measurements (length x width x depth, in cm) 1.2 x 9.5 x 0.2cm      Tunneling N/A      Undermining NA  Periwound skin:  Area of erythema/rash 2 x 3.5 x 0cm superior to wound continues; mild erythema and irritation remain in general      Color: red blanchable erythema      Temperature: normal to warm and sweaty  Odor: none    Pain: tender   Pain intervention: slow and gentle cares  Treatment goal: Heal   STATUS: improving slowly  Supplies ordered: at bedside     Treatment Plan:     Right posterior waist crease: Daily  1. Remove old dressing, moistening with saline or wound cleanser to help release if needed.   2. Cleanse wound and periwound skin with Vashe ( #[id number]) solution and 4x4\" gauze, pat dry.  3. Apply 3M No Sting barrier wipe to periwound skin, let dry.  4. Cut a length of Mesalt " ribbon (# [id number]) and apply to wound bed, 1-2 layers.  Can keep the leftover Mesalt in a specimen cup to keep it clean and to find it easily.   5. Cover with Mepilex, conforming carefully to skin contours.  Try to avoid covering the rashy area superior to wound if possible.   6. Time/Date/Initial dressing change.      Orders: Reviewed and Updated    RECOMMEND PRIMARY TEAM ORDER: None, at this time  Education provided: importance of repositioning, plan of care, wound progress, Moisture management and Off-loading pressure  Discussed plan of care with: Patient and Nurse  WOC nurse follow-up plan: weekly  Notify WOC if wound(s) deteriorate.  Nursing to notify the Provider(s) and re-consult the WOC Nurse if new skin concern.    DATA:     Current support surface: Bariatric Low air loss mattress    Containment of urine/stool: Incontinence Protocol and Incontinent pad in bed  BMI: Body mass index is 42.61 kg/m .   Active diet order: Orders Placed This Encounter      Advance Diet as Tolerated      Regular Diet Adult Thin Liquids (level 0) (Upright position, alert, and assist as needed)     Output: I/O last 3 completed shifts:  In: 180 [P.O.:180]  Out: -      Labs:   No lab results found in last 7 days.    Invalid input(s): GLUCOMBO  Pressure injury risk assessment:   Sensory Perception: 3-->slightly limited  Moisture: 3-->occasionally moist  Activity: 2-->chairfast  Mobility: 2-->very limited  Nutrition: 2-->probably inadequate  Friction and Shear: 1-->problem  Demetrio Score: 13    Talia Linda RN CWOCN   Dept. Pager: [phone]  Dept. Office Number: [phone]

## 2022-10-27 NOTE — PLAN OF CARE
Goal Outcome Evaluation:       DATE & TIME:10/26/22 4349-6997  Cognitive Concerns/ Orientation: A&Ox3 disoriented to time. Cooperative, but anxious at times. Calls out occasionally remind to use call light  BEHAVIOR & AGGRESSION TOOL COLOR: Green     ABNL VS/O2: VSS on RA, BP soft at times.    MOBILITY: Ax2 with lift for transfer. T/R q2hr. Up in chair for couple of hours   PAIN MANAGMENT: LE pain/ numbness & tingling , refused medication.   DIET: Regular. Poor appetite. Needs encouragement.    BOWEL/BLADDER: Incont of urine, small amount of blood, medium formed BM today during day shift   ABNL LAB/BG: NA  DRAIN/DEVICES: PIV/SL   SKIN: Pale, dry and flaky. Blanchable redness to coccyx, perineum and buttock. BLE patyt, refuses to elevated on pillows at times. Redness/excoriation to abdominal folds, armpits and under breasts. Scheduled Miconazole powder applied, and PRN. R Flank  dressing cleaned and changed x1. Large purple bruise to R. Upper arm. On air mattress.   TESTS/PROCEDURES: NA  D/C DATE: Pending LTC placement, SW is following. Oscar gaytan currently assessing, referrals sent to [facility] in [place] today.  Discharge Barriers: Placement ,   OTHER: discontinued K and Mg protocols, on scheduled Potassium BID.

## 2022-10-28 NOTE — PLAN OF CARE
Goal Outcome Evaluation:     DATE & TIME: 10/28/22 5007-2699  Cognitive Concerns/ Orientation: A&Ox3 disoriented to time. Cooperative, but can get anxious at times. Calls out occasionally remind to use call light  BEHAVIOR & AGGRESSION TOOL COLOR: Green     ABNL VS/O2: VSS, RA  MOBILITY: Ax2 with lift to chair for meals. T/R q2hr.   PAIN MANAGMENT: denies  DIET: Regular; poor appetite; refuses MVI   BOWEL/BLADDER: no void this shift; bladder scan at 1430 was 165 ml, fluids encouraged.; smear of old blood on cleansing but no obvious active vaginal bleeding.  ABNL LAB/BG:   DRAIN/DEVICES: PIV/SL LFA  SKIN: Pale, dry and flaky. Blanchable redness to coccyx, perineum and buttock. BLE patty,  Miconazole powder applied to abdominal folds and triad paste at a small midline open area; wound to R flank, dressing changed per POC;  Large purple bruise to R. Upper arm. On pulsate mattress.   TESTS/PROCEDURES: NA  D/C DATE: Pending LTC placement, SW is following.   Discharge Barriers: Placement.  OTHER:

## 2022-10-28 NOTE — PLAN OF CARE
Goal Outcome Evaluation:                      DATE & TIME: 10/28/22 Night shift  Cognitive Concerns/ Orientation: A&Ox3 disoriented to time. Cooperative, but can get anxious at times. Calls out occasionally remind to use call light  BEHAVIOR & AGGRESSION TOOL COLOR: Green     ABNL VS/O2: none. BID vitals   MOBILITY: Ax2 with lift for transfer. T/R q2hr.   PAIN MANAGMENT: denies  DIET: Regular.   BOWEL/BLADDER: Incont of urine and stool. No bm  ABNL LAB/BG: none  DRAIN/DEVICES: PIV/SL   SKIN: Pale, dry and flaky. Blanchable redness to coccyx, perineum and buttock. BLE patty, Redness/excoriation to abdominal folds, armpits and under breasts. Miconazole powder applied. R Flank  dressing CDI.  Large purple bruise to R. Upper arm. On pulsate mattress.   TESTS/PROCEDURES: NA  D/C DATE: Pending LTC placement, SW is following.   Discharge Barriers: Placement.  OTHER: on schedule liquid potassium BID.

## 2022-10-28 NOTE — PROGRESS NOTES
[facility]    Medicine Progress Note - Hospitalist Service    Date of Admission:  7/13/2022    Assessment & Plan        Cristy Bailey is a 77 year old female with a PMHx significant for morbid obesity; HTN; and HLD; who presented to [facility] 7/13/2022 with complete left-sided paralysis, left-sided facial droop, and difficulty speaking. CTA head remarkable for right MCA occlusion. She was given tenecteplase and subsequently transferred to [facility] 7/13/2022 for thrombectomy.      Hospital stay complicated by development and progression of flank wound.  This subsequently required surgical debridement and placement of wound vac (8/2) that was subsequently removed (8/22).      Prolonged hospitalization related todifficulties finding LTC placement.     Problem List:     1. Acute R MCA ischemic stroke with edema and right to left midline shift - s/p tenecteplase and subsequent R MCA mechanical thrombectomy 7/13/22  - Presented to [facility] 7/13 with complete paralysis, left-sided facial droop, and aphasia. Code stroke initiated. Head CT 7/13 showed signs of an evolving R MCA infarct. CTA head 7/13 showed a right carotid terminus occlusion, right middle cerebral artery M1 segment occlusion with poor opacification of the more distal right MCA branches/poor collateral flow. CTA neck 7/13 negative for acute occlusions. Pt given tenecteplase 7/13 at 13:11. Noted to be agitated and was subsequently intubated given need for procedure.   - Subsequently transferred to [facility] 7/13/2022 where she underwent above procedure.   - Extubated 7/14. Head CT 7/14 showed evolution of acute infarct involving the R MCA distribution with increased swelling, cortical effacement, and new mild right-to-left midline shift; questionable hypoattenuation involving the bilateral occipital lobes which could be artifactual.  Echo 7/14 showed LVEF 50%, grade 1 diastolic dysfunction.   - Started ASA and  atorvastatin per stroke team.   - Repeat head CT 7/15 showed evolving R MCA stroke with areas of edema, overall stable.  -- Continue  mg daily, atorvastatin 40 mg daily.  -- Goal SBP <140/90 and met   -- Monitored on telemetry for first several wks of hospital stay without abnl rhythm so will not order 30d cardiac event monitor at discharge per neurology recommendations (from 10/18)  -- Follow up with [facility] in 6-8 wks     Neuro status stable     2. Anxiety  Cognitive impairment with intermittent situational confusion  Metabolic encephalopathy, resolved  - On no psychiatric medications PTA.  - Pt with issues with anxiety and intermittent situational confusion this hospitalization.    - Seen by Psychiatry this hospitalization.  - Started on multiple medications this hospitalization including quetiapine, olanzapine, sertraline, mirtazapine, PRN hydroxyzine, PRN lorazepam.  - Medication regimen was adjusted/titrated. Mirtazapine, PRN lorazepam and PRN hydroxyzine subsequently stopped. See previous progress notes for more details.  - PRN ativan stopped on 08/27/22 as leading to increased confusion. Hydroxyzine stopped as well.   -- Continue aripiprazole 10 mg qpm; olanzapine 2.5 mg at bedtime; sertraline 50 mg daily  -- Continue PRN olanzapine 5 mg q6h     3. Lower back/R flank wound/abscess, suspect dt pressure type injury - s/p surgical debridement on 7/30/2022 and placement of wound vac on 8/2/2022 (subsequently removed 8/22/2022).  OA  Hx of bilateral hip replacements  Hx of chronic neck and back pain  -  Patient has chronic back pain, reports having it over the last 2 years. MRI L-spine in 2018 showed multilevel degenerative changes with mild central stenosis.  - Pt with significant back pain early on in hospital stay. Was requiring IV hydromorphone.  Dose had to be decreased dt somnolence.  - During this stay, patient was noted with 5-6cm by 2-3cm wound with swelling/fluid/blistering in a fold of her lower  back, did not appear infected; this seemed to be the source of her pain. Padded dressing placed and WOC RN ordered. Pain initially improved.   - Was started on a course of amoxicillin-clavulanate on 7/24.   - On 7/26, was re-evaluated by WOC RN. Wound appeared more erythematous and purulence expressed. Worsening with shear stress from lift. Procal <0.05, WBC WNL. Abx changed to IV clindamycin.  - Wound cultures obtained. On 7/28, wound grew proteus and Staph aureus (MRSA neg). US neg for abscess.   - Lost IV access on 7/28 so abx were changed to oral clindamycin and oral levaquin. IV access re-established but was continued on oral abx.  - General surgery consulted, ultimately underwent excisional debridement of R flank abscess in OR on 7/30. Intraop cultures showed polymicrobial growth with proteus mirabilis x2 strains (both pan-sensitive), corynebacterium striatum and enterococcus faecalis.  - Wound vac placed per general surgery on 8/2.  -ID consulted on 8/2 and abx narrowed to amoxicillin-clavulanate alone, completed an additional 5 days of treatment on 8/7.   - Wound vac removed 8/22.  -- Continue local wound cares per WOC RN, follows weekly  -- Continue PRN oxycodone; minimize opioids as able.  -- Continue regular repositioning  -- Management of other pressure injuries as below     4. Volume overload secondary to acute diastolic CHF exacerbation, resolved  Essential hypertension  * Not on/needing meds PTA.   * As above, echo this stay showed EF 50% with grade I diastolic dysfunction; RV not well visualized but appeared mild-moderately dilated with global systolic function probably mildly reduced.   * BPs were initially soft this stay and required IVFs. BPs improved.   * Subsequently developed volume overload with elevated BP's and diuresed with IV furosemide, subsequently transitioned to PO furosemide.  * Issues with recurrent hypokalemia on furosemide; noted often refusing potassium.  * Started on lisinopril,  but subsequently stopped due to soft BP's on furosemide.  * Furosemide increased and spironolactone added 9/3.  -- Continue furosemide 20 mg BID;   -- has not been getting aldactone for few days because soft bp and parameters, resume aldactone 12.5mg daily with parameters  -- will need f/u potassium level in 2-3 days since she is on scheduled potassium 20mEq BID with lasix and aldactone   -Potassium was 3.5 on 10/26/2022 and will continue with diuretics and monitor intake and output     5. Severe malnutrition related to acute and chronic medical issues  Significant weight loss this hospitalization  - Nutritionist following. Appetite poor this stay, needing encouragement to take po.   -There was some discussion previously about needing feeding support, but noted that patient does not want to have a feeding tube.  - As of 10/19, has had 107 lb weight loss since admit (374 lbs on 7/14; 267 lbs 10/19); noted some contribution from diuresis for CHF.  -- Continue to encourage oral intake  -- Continue supplements  -- Patient does not want a feeding tube and her health care agent Leo agrees with this, consistent with previous wishes     6. Vaginal bleeding  Abnormal endometrial thickening on US  - Pt with vaginal bleeding intermittently this hospitalization.  - Pelvic US 10/8 showed abnormal endometrial thickening to 14 mm.   - Gyn consulted 10/9 and recommend outpatient evaluation.  - Hgb overall stable.  -- Follow-up with Gyn outpatient for endometrial sampling in the near future     7. Deep tissue pressure injury (DTPI) left medial thigh/groin region, hospital acquired, progressed to HAPI stage 2 pressure injury; related to purwick external catheter tubing (not the purwick device itself)  Pressure injuries, bilateral buttocks  - DTPI left medial thigh/groin region noted this hospitalization. WOC RN consulted.   - Pressure injuries in bilateral buttocks noted 9/2. Seen by WOC RN.  -- Continue local wound cares per WOC  RN  -- Continue regular repositioning     8. Dyslipidemia  - FLP this stay showed tot cholest 163, HDL 47, LDL 91, .   - Started on atorvastatin 40 mg daily  -- Continue atorvastatin 40 mg daily     9. Chronic bilateral LE edema with chronic venous stasis dermatitis and chronic skin changes  Hx of LLE cellulitis  - Was hospitalized in 11/2021 for sepsis dt to pneumonia and LLE cellulitis.   - During this stay, BLE noted to be patty and edematous, no unilateral leg swelling appreciated; LLE had patchy areas of erythema, no fluctuance, no painful areas with palpation; legs warm to touch. Lymphedema consulted.   -- Cont LE elevation, lymphedema wraps  -- Continue diuretic (as above)     10. Hypokalemia, hypophosphatemia.  - Potassium and phosphorus low at times this hospitalization. Potassium low related to diuretics. Treated with replacement.  - increased scheduled KCl to 20mEq BID on 10/24 since was not getting aldactone  - discontinue electrolyte protocols on 10/24 to minimize frequent daily blood draws since have been stable over the last several days  - K was normal on 10/26/2022 at 3.5 and check every 3-4 days      11. Intertriginous dermatitis  - Chronic and stable.  -- Continue clotrimazole powder     12. Constipation  -- Continue scheduled senna-docusate, polyethylene glycol, PRN bisacodyl     13. Suspected meningioma left parietal region, incidentally seen on admission CT on 7/13/2022  - Head CT 7/13 showed a calcified extra-axial mass overlying the left parietal region measuring 1.4 cm, potentially representing a meningioma.  -- Will need serial monitoring OP after discharge     14. Suspected sleep apnea  - O2 drop to mid 80s overnight 8/14. Briefly placed on 4LPM, which she then removed and then slept okay with sats in 90s thereafter.  -- Consider sleep study as outpatient     15. Morbid obesity with significant weight loss this hospitalization  - BMI 59 on admission.  - Significant weight loss this  hospitalization as above (107 lbs as of 10/19). BMI 43.2 on 10/20.  -- Continue aggressive dietary and lifestyle modifications      16. Goals of care       Failure to thrive  - During this hospitalization, discussed concern about overall prognosis, with her eating 0-25% of her meals and nutrition recommendations for alternative forms of nutrition; at that time, she had lost 70 lbs since admit (some of that was probably related to diuresis and some related to variable weights in hospital, but still significant).   - Patient and significant other/HCA Leo, reiterated her wishes for DNR/DNI and no feeding tubes.  - Palliative care met with Leo 9/28 and he did not want hospice.  -- Continue restorative cares  -- Pt is DNR/DNI, no tube feedings     DISPOSITION  -- Plan LTC placement once facility available and still looking         OTHER RESOLVED/CHRONIC ISSUES:  Urinary retention, resolved  * Retaining urine on 8/14. UA WNL, not worrisome for infection.  * Requiring straight cath x1 on 8/20 PM.  - Subsequently good UOP, no longer requiring straight cath. No evidence of obstruction on PVR.      Dysphagia due to CVA, resolved  - Seen by SLP and noted with dysphagia. Initially required some intermittent fluid boluses.   - Was eventually able to advance to regular diet w/thin liquids.     Prolonged QTc.  - EKG on 7/13 showed QTc 494.   - Repeat EKG on 7/30 (while on levofloxacin) showed QTc stable at 475. Has since completed course of abx as below.   Had been monitoring on telemetry this stay. No concerning findings so tele was ultimately dc'd 8/19.     Anemia, suspect dilutional component, resolved  - Hgb normal on admit. Hgb 11.5 on 7/16. No overt clinical signs of major bleeding.  - Hgb subsequently stable and later normalized.       Diet: Advance Diet as Tolerated  Regular Diet Adult Thin Liquids (level 0) (Upright position, alert, and assist as needed)  Room Service    DVT Prophylaxis: Enoxaparin (Lovenox) SQ  [place]  Catheter: Not present  Central Lines: None  Cardiac Monitoring: None  Code Status: No CPR- Do NOT Intubate      Disposition Plan      Expected Discharge Date: 11/01/2022    Discharge Delays: Placement - LTC  *Medically Ready for Discharge    Discharge Comments: Multiple referrerals out  Did receive her 2nd covid shot  LTC placement        The patient's care was discussed with the Bedside Nurse and Patient.    Gavin Harrington MD  Hospitalist Service  [facility]  Securely message with the Vocera Web Console (learn more here)  Text page via [employer] Paging/[employer]         Clinically Significant Risk Factors                        # Severe Malnutrition: based on nutrition assessment        I am off service in am and her care to be taken over by hospital medicine team  ______________________________________________________________________    Interval History     The patient today and she mentioned to me that she is eating drinking, but denied any chest pain or shortness of breath.  I did encourage the patient to get out of the bed.  She denied any nausea or vomiting.    I did discuss the plan of care with her nurse Camille    Data reviewed today: I reviewed all medications, new labs and imaging results over the last 24 hours. I personally reviewed no images or EKG's today.    Physical Exam   Vital Signs: Temp: 97.9  F (36.6  C) Temp src: Oral BP: 121/80 Pulse: 74   Resp: 20 SpO2: 95 % O2 Device: None (Room air)    Weight: 264 lbs 0 oz        General: Patient appears comfortable and in no acute distress.  HEENT: Head is atraumatic, normocephalic.  Pupils are equal, round and reactive to light.  No scleral icterus. Oral mucosa is moist   Neck: Neck is supple   Respiratory: Lungs are clear to auscultation bilaterally with no wheeze or crackles   Cardiovascular: Regular rate , S1 and S2 normal with no murmer or rubs or gallops  Abdomen:   soft , non tender , non distended and bowel sound present   Skin:  No skin rashes or lesions to inspection or palpation.  Neurologic: Higher functions are within normal limits. No obvious defects in speech, language and memory. No facial droop  Musculoskeletal: did move her upper and lower extremities   Psychiatric: cooperative     Data   Recent Labs   Lab 10/27/22  0900 10/26/22  0943 10/25/22  0945 10/24/22  0752    [phone]   NA  --   --   --  136   POTASSIUM  --  3.5 3.4 3.6  3.5   CHLORIDE  --   --   --  103   CO2  --   --   --  26   BUN  --   --   --  16   CR  --   --   --  0.76   ANIONGAP  --   --   --  7   ASHIA  --   --   --  8.6   GLC  --   --   --  87     No results found for this or any previous visit (from the past 24 hour(s)).  Medications     - MEDICATION INSTRUCTIONS -         ARIPiprazole  10 mg Oral QPM     aspirin  325 mg Oral Daily     atorvastatin  40 mg Oral QPM     enoxaparin ANTICOAGULANT  40 mg Subcutaneous Q12H     furosemide  20 mg Oral BID     miconazole   Topical BID     multivitamin w/minerals  1 tablet Oral Daily     OLANZapine  2.5 mg Oral At Bedtime     polyethylene glycol  17 g Oral Daily     potassium chloride  20 mEq Oral BID     senna-docusate  1-2 tablet Oral BID     sertraline  50 mg Oral Daily     sodium chloride (PF)  3 mL Intracatheter Q8H     spironolactone  12.5 mg Oral Daily

## 2022-10-28 NOTE — PLAN OF CARE
Goal Outcome Evaluation:                      DATE & TIME: 10/27/22 pm shift  Cognitive Concerns/ Orientation: A&Ox3 disoriented to time. Cooperative, but can get anxious at times. Calls out occasionally remind to use call light  BEHAVIOR & AGGRESSION TOOL COLOR: Green     ABNL VS/O2: VSS on RA, BP soft.    MOBILITY: Ax2 with lift for transfer. T/R q2hr. Refused to get up in chair for dinner.  PAIN MANAGMENT: denies  DIET: Regular. Poor appetite. Needs encouragement.    BOWEL/BLADDER: Incont of urine and stool. Had bm  ABNL LAB/BG: none  DRAIN/DEVICES: PIV/SL   SKIN: Pale, dry and flaky. Blanchable redness to coccyx, perineum and buttock. BLE patty, Redness/excoriation to abdominal folds, armpits and under breasts. Scheduled Miconazole powder applied, and PRN. R Flank  dressing CDI. WOC also looked at wound today on R. Flank (see note).  Large purple bruise to R. Upper arm. On air mattress.   TESTS/PROCEDURES: NA  D/C DATE: Pending LTC placement, SW is following.   Discharge Barriers: Placement.  OTHER: on schedule liquid potassium BID.

## 2022-10-28 NOTE — PROGRESS NOTES
CLINICAL NUTRITION SERVICES - REASSESSMENT NOTE      RECOMMENDATIONS FOR MD/PROVIDER TO ORDER:   Max nutrition interventions reached. Pt continues to decline supplements, has declined nutrition support as well.    Future/Additional Recommendations:   - Continue room service assistance and thera vit M daily  - Encourage intakes    Malnutrition:  (9/21)  % Weight Loss:  > 5% in 1 month (severe malnutrition) - significant loss over this admit  % Intake:  </= 50% for >/= 5 days (severe malnutrition) - consistent this admit x70 days  Subcutaneous Fat Loss:  Orbital region moderate depletion - visual, per 8/16 note  Muscle Loss:  Temporal region moderate depletion - visual, per 8/16 note  Fluid Retention:  Mild generalized edema     Malnutrition Diagnosis: Severe malnutrition  In Context of:  Acute illness or injury  Chronic illness or disease       EVALUATION OF PROGRESS TOWARD GOALS   Diet:  Regular  Room Service w/ Assist     Supplements: Declines     Intake/Tolerance:    Per chart review patient continues to consume 0-25% of meals. Being seen daily for meal ordering assistance. This has been patient's chronic eating pattern x 107 day admission. Has consistently declined supplements and does not agree to nutrition support interventions.     Getting a MVI+M daily.       ASSESSED NUTRITION NEEDS:  Dosing Weight: 89.7 kg (adjusted)  Estimated Energy Needs: 5060-8757+ kcals (15-20+ Kcal/Kg)  Justification: maintenance r/t BMI, Wounds  Estimated Protein Needs: 108-135 grams protein (1.2-1.5 g pro/Kg)  Justification: wound healing, obesity guidelines, preservation of lean body mass and increased needs r/t age, Wounds       NEW FINDINGS:   Diuresing on lasix BID - down > 100 lb in the past 3+ months of admission. Suspect component of both fluid and true weight loss with inadequate nutritional intake over entire admission.     10/28: WOCN =   Right lower back waist crease Wound due to: Hospital Acquired Pressure injury stage 3  - slowly improving     Previous Goals:   Intake of at least 50% of meals BID.   Evaluation: Not met    Previous Nutrition Diagnosis:   Inadequate oral intake related to poor appetite, disinterest in eating, and increased protein needs for wound healing as evidenced by 0-50% of meals consistently over 97 day admission  Evaluation: No change      CURRENT NUTRITION DIAGNOSIS  Inadequate oral intake related to poor appetite, disinterest in eating, and increased protein needs for wound healing as evidenced by 0-50% of meals consistently over 107 day admission    INTERVENTIONS  Recommendations / Nutrition Prescription  As above    Implementation  None new - patient declines any further nutrition interventions     Goals  Intake of at least 50% of meals BID.       MONITORING AND EVALUATION:  Progress towards goals will be monitored and evaluated per protocol and Practice Guidelines      Thi Conde RD, LD

## 2022-10-29 NOTE — PROVIDER NOTIFICATION
Dr. Lee notified via vocera page of sudden vomiting and rigors.  Uncontrollable shaking.  No fever orally or axillary. Oxygen applied for sat 88%, zofran given with little relie.  Dr. Lee returned call.  Will get abd and chest xrays

## 2022-10-29 NOTE — PLAN OF CARE
Goal Outcome Evaluation:       DATE & TIME: 10/28/22 [id number]  Cognitive Concerns/ Orientation: A&Ox3 disoriented to time. Cooperative, but can get anxious at times. Calls out occasionally remind to use call light  BEHAVIOR & AGGRESSION TOOL COLOR: Green     ABNL VS/O2: VSS, RA   MOBILITY: Ax2 with lift to chair for meals. T/R q2hr.   PAIN MANAGMENT: denies  DIET: Regular; poor appetite.  BOWEL/BLADDER: incontinent of B&B; No vaginal blood noted this shift.  ABNL LAB/BG: NA  DRAIN/DEVICES: PIV/SL LFA  SKIN: Pale, dry and flaky. Blanchable redness to coccyx, perineum and buttock. BLE patty,  Miconazole powder applied to abdominal folds and triad paste at a small midline open area; wound to R flank, dressing intact;  Large purple bruise to R. Upper arm. On pulsate mattress.   TESTS/PROCEDURES: NA  D/C DATE: Pending LTC placement, SW is following.   Discharge Barriers: Placement.  OTHER:

## 2022-10-29 NOTE — PLAN OF CARE
Goal Outcome Evaluation:  DATE & TIME: 10/28/22   Cognitive Concerns/ Orientation: A&Ox3 disoriented to time. Cooperative, but can get anxious at times. Calls out occasionally remind to use call light  BEHAVIOR & AGGRESSION TOOL COLOR: Green     ABNL VS/O2: VSS, RA (BP soft 99/70  MOBILITY: Ax2 with lift to chair for meals. T/R q2hr.   PAIN MANAGMENT: denies  DIET: Regular; poor appetite; poor appetite.  BOWEL/BLADDER: Medium void this shift; No vaginal blood noted this shift.  ABNL LAB/BG: NA  DRAIN/DEVICES: PIV/SL LFA  SKIN: Pale, dry and flaky. Blanchable redness to coccyx, perineum and buttock. BLE patty,  Miconazole powder applied to abdominal folds and triad paste at a small midline open area; wound to R flank, dressing intact;  Large purple bruise to R. Upper arm. On pulsate mattress.   TESTS/PROCEDURES: NA  D/C DATE: Pending LTC placement, SW is following.   Discharge Barriers: Placement.  OTHER:

## 2022-10-29 NOTE — PROGRESS NOTES
[facility]    Medicine Progress Note - Hospitalist Service        Date of Admission:  7/13/2022  3:02 PM    Assessment & Plan:   Cristy Bailey is a 77 year old female with a PMHx significant for morbid obesity; HTN; and HLD; who presented to [facility] 7/13/2022 with complete left-sided paralysis, left-sided facial droop, and difficulty speaking. CTA head remarkable for right MCA occlusion. She was given tenecteplase and subsequently transferred to [facility] 7/13/2022 for thrombectomy.      Hospital stay complicated by development and progression of flank wound.  This subsequently required surgical debridement and placement of wound vac (8/2) that was subsequently removed (8/22).      Prolonged hospitalization related todifficulties finding LTC placement.      1. Acute R MCA ischemic stroke with edema and right to left midline shift - s/p tenecteplase and subsequent R MCA mechanical thrombectomy 7/13/22  - Presented to [facility] 7/13 with complete paralysis, left-sided facial droop, and aphasia. Code stroke initiated. Head CT 7/13 showed signs of an evolving R MCA infarct. CTA head 7/13 showed a right carotid terminus occlusion, right middle cerebral artery M1 segment occlusion with poor opacification of the more distal right MCA branches/poor collateral flow. CTA neck 7/13 negative for acute occlusions. Pt given tenecteplase 7/13 at 13:11. Noted to be agitated and was subsequently intubated given need for procedure.   - Subsequently transferred to [facility] 7/13/2022 where she underwent above procedure.   - Extubated 7/14. Head CT 7/14 showed evolution of acute infarct involving the R MCA distribution with increased swelling, cortical effacement, and new mild right-to-left midline shift; questionable hypoattenuation involving the bilateral occipital lobes which could be artifactual.  Echo 7/14 showed LVEF 50%, grade 1 diastolic dysfunction.   - Started ASA and atorvastatin per stroke  team.   - Repeat head CT 7/15 showed evolving R MCA stroke with areas of edema, overall stable.  -- Continue  mg daily, atorvastatin 40 mg daily.  -- Goal SBP <140/90 and met   -- Monitored on telemetry for first several wks of hospital stay without abnl rhythm so will not order 30d cardiac event monitor at discharge per neurology recommendations (from 10/18)  -- Follow up with [facility] in 6-8 wks     Neuro status stable     2. Anxiety  Cognitive impairment with intermittent situational confusion  Metabolic encephalopathy, resolved  - On no psychiatric medications PTA.  - Pt with issues with anxiety and intermittent situational confusion this hospitalization.    - Seen by Psychiatry this hospitalization.  - Started on multiple medications this hospitalization including quetiapine, olanzapine, sertraline, mirtazapine, PRN hydroxyzine, PRN lorazepam.  - Medication regimen was adjusted/titrated. Mirtazapine, PRN lorazepam and PRN hydroxyzine subsequently stopped. See previous progress notes for more details.  - PRN ativan stopped on 08/27/22 as leading to increased confusion. Hydroxyzine stopped as well.   -- Continue aripiprazole 10 mg qpm; olanzapine 2.5 mg at bedtime; sertraline 50 mg daily  -- Continue PRN olanzapine 5 mg q6h     3. Lower back/R flank wound/abscess, suspect dt pressure type injury - s/p surgical debridement on 7/30/2022 and placement of wound vac on 8/2/2022 (subsequently removed 8/22/2022).  OA  Hx of bilateral hip replacements  Hx of chronic neck and back pain  -  Patient has chronic back pain, reports having it over the last 2 years. MRI L-spine in 2018 showed multilevel degenerative changes with mild central stenosis.  - Pt with significant back pain early on in hospital stay. Was requiring IV hydromorphone.  Dose had to be decreased dt somnolence.  - During this stay, patient was noted with 5-6cm by 2-3cm wound with swelling/fluid/blistering in a fold of her lower back, did not appear  infected; this seemed to be the source of her pain. Padded dressing placed and WOC RN ordered. Pain initially improved.   - Was started on a course of amoxicillin-clavulanate on 7/24.   - On 7/26, was re-evaluated by WOC RN. Wound appeared more erythematous and purulence expressed. Worsening with shear stress from lift. Procal <0.05, WBC WNL. Abx changed to IV clindamycin.  - Wound cultures obtained. On 7/28, wound grew proteus and Staph aureus (MRSA neg). US neg for abscess.   - Lost IV access on 7/28 so abx were changed to oral clindamycin and oral levaquin. IV access re-established but was continued on oral abx.  - General surgery consulted, ultimately underwent excisional debridement of R flank abscess in OR on 7/30. Intraop cultures showed polymicrobial growth with proteus mirabilis x2 strains (both pan-sensitive), corynebacterium striatum and enterococcus faecalis.  - Wound vac placed per general surgery on 8/2.  -ID consulted on 8/2 and abx narrowed to amoxicillin-clavulanate alone, completed an additional 5 days of treatment on 8/7.   - Wound vac removed 8/22.  -- Continue local wound cares per WOC RN, follows weekly  -- Continue PRN oxycodone; minimize opioids as able.  -- Continue regular repositioning  -- Management of other pressure injuries as below     4. Volume overload secondary to acute diastolic CHF exacerbation, resolved  Essential hypertension  * Not on/needing meds PTA.   * As above, echo this stay showed EF 50% with grade I diastolic dysfunction; RV not well visualized but appeared mild-moderately dilated with global systolic function probably mildly reduced.   * BPs were initially soft this stay and required IVFs. BPs improved.   * Subsequently developed volume overload with elevated BP's and diuresed with IV furosemide, subsequently transitioned to PO furosemide.  * Issues with recurrent hypokalemia on furosemide; noted often refusing potassium.  * Started on lisinopril, but subsequently  stopped due to soft BP's on furosemide.  * Furosemide increased and spironolactone added 9/3.  -- Continue furosemide 20 mg BID;   -- has not been getting aldactone for few days because soft bp and parameters, resume aldactone 12.5mg daily with parameters  -- will need f/u potassium level in 2-3 days since she is on scheduled potassium 20mEq BID with lasix and aldactone  - potassium 4.1 today.    5. Severe malnutrition related to acute and chronic medical issues  Significant weight loss this hospitalization  - Nutritionist following. Appetite poor this stay, needing encouragement to take po.   -There was some discussion previously about needing feeding support, but noted that patient does not want to have a feeding tube.  - As of 10/19, has had 107 lb weight loss since admit (374 lbs on 7/14; 267 lbs 10/19); noted some contribution from diuresis for CHF.  -- Continue to encourage oral intake  -- Continue supplements  -- Patient does not want a feeding tube and her health care agent Leo agrees with this, consistent with previous wishes     6. Vaginal bleeding  Abnormal endometrial thickening on US  - Pt with vaginal bleeding intermittently this hospitalization.  - Pelvic US 10/8 showed abnormal endometrial thickening to 14 mm.   - Gyn consulted 10/9 and recommend outpatient evaluation.  - Hgb overall stable.  -- Follow-up with Gyn outpatient for endometrial sampling in the near future     7. Deep tissue pressure injury (DTPI) left medial thigh/groin region, hospital acquired, progressed to HAPI stage 2 pressure injury; related to purwick external catheter tubing (not the purwick device itself)  Pressure injuries, bilateral buttocks  - DTPI left medial thigh/groin region noted this hospitalization. WOC RN consulted.   - Pressure injuries in bilateral buttocks noted 9/2. Seen by WOC RN.  -- Continue local wound cares per WOC RN  -- Continue regular repositioning     8. Dyslipidemia  - FLP this stay showed tot cholest  163, HDL 47, LDL 91, .   - Started on atorvastatin 40 mg daily  -- Continue atorvastatin 40 mg daily     9. Chronic bilateral LE edema with chronic venous stasis dermatitis and chronic skin changes  Hx of LLE cellulitis  - Was hospitalized in 11/2021 for sepsis dt to pneumonia and LLE cellulitis.   - During this stay, BLE noted to be patty and edematous, no unilateral leg swelling appreciated; LLE had patchy areas of erythema, no fluctuance, no painful areas with palpation; legs warm to touch. Lymphedema consulted.   -- Cont LE elevation, lymphedema wraps  -- Continue diuretic (as above)     10. Hypokalemia, hypophosphatemia.  - Potassium and phosphorus low at times this hospitalization. Potassium low related to diuretics. Treated with replacement.  - increased scheduled KCl to 20mEq BID on 10/24 since was not getting aldactone  - discontinue electrolyte protocols on 10/24 to minimize frequent daily blood draws since have been stable over the last several days  - Potassium normal today      11. Intertriginous dermatitis  - Chronic and stable.  -- Continue clotrimazole powder     12. Constipation  -- Continue scheduled senna-docusate, polyethylene glycol, PRN bisacodyl     13. Suspected meningioma left parietal region, incidentally seen on admission CT on 7/13/2022  - Head CT 7/13 showed a calcified extra-axial mass overlying the left parietal region measuring 1.4 cm, potentially representing a meningioma.  -- Will need serial monitoring OP after discharge     14. Suspected sleep apnea  - O2 drop to mid 80s overnight 8/14. Briefly placed on 4LPM, which she then removed and then slept okay with sats in 90s thereafter.  -- Consider sleep study as outpatient     15. Morbid obesity with significant weight loss this hospitalization  - BMI 59 on admission.  - Significant weight loss this hospitalization as above (107 lbs as of 10/19). BMI 43.2 on 10/20.  -- Continue aggressive dietary and lifestyle  modifications      16. Goals of care       Failure to thrive  - During this hospitalization, discussed concern about overall prognosis, with her eating 0-25% of her meals and nutrition recommendations for alternative forms of nutrition; at that time, she had lost 70 lbs since admit (some of that was probably related to diuresis and some related to variable weights in hospital, but still significant).   - Patient and significant other/HCA Leo, reiterated her wishes for DNR/DNI and no feeding tubes.  - Palliative care met with Leo 9/28 and he did not want hospice.  -- Continue restorative cares  -- Pt is DNR/DNI, no tube feedings     DISPOSITION  -- Plan LTC placement once facility available and still looking         OTHER RESOLVED/CHRONIC ISSUES:  Urinary retention, resolved  * Retaining urine on 8/14. UA WNL, not worrisome for infection.  * Requiring straight cath x1 on 8/20 PM.  - Subsequently good UOP, no longer requiring straight cath. No evidence of obstruction on PVR.      Dysphagia due to CVA, resolved  - Seen by SLP and noted with dysphagia. Initially required some intermittent fluid boluses.   - Was eventually able to advance to regular diet w/thin liquids.     Prolonged QTc.  - EKG on 7/13 showed QTc 494.   - Repeat EKG on 7/30 (while on levofloxacin) showed QTc stable at 475. Has since completed course of abx as below.   Had been monitoring on telemetry this stay. No concerning findings so tele was ultimately dc'd 8/19.     Anemia, suspect dilutional component, resolved  - Hgb normal on admit. Hgb 11.5 on 7/16. No overt clinical signs of major bleeding.  - Hgb subsequently stable and later normalized.          Diet: Advance Diet as Tolerated  Regular Diet Adult Thin Liquids (level 0) (Upright position, alert, and assist as needed)  Room Service     DVT Prophylaxis: Pneumatic Compression Devices   [place] Catheter: Not present  Code Status: No CPR- Do NOT Intubate     Disposition Plan      Expected  "Discharge Date: 11/01/2022    Discharge Delays: Placement - LTC  *Medically Ready for Discharge    Discharge Comments: Multiple referrerals out  Did receive her 2nd covid shot  LTC placement      Entered: Chris Lee MD 10/29/2022, 9:36 AM            The patient's care was discussed with the Bedside Nurse and Patient.    Chris Lee MD  Hospitalist Service  [facility]  Text Page 7AM-6PM  Securely message with the Vocera Web Console (learn more here)  Text page via [employer] Paging/Directory    ______________________________________________________________________    Interval History   No new nursing concerns.  Patient denies new complaints.  Awaiting placement    Data reviewed today: I reviewed all medications, new labs and imaging results over the last 24 hours. I personally reviewed no images or EKG's today.    Physical Exam   Vital signs:  Temp: 97.1  F (36.2  C) Temp src: Oral BP: 111/64 Pulse: 74   Resp: 16 SpO2: 92 % O2 Device: None (Room air) Oxygen Delivery: 2 LPM Height: 167.6 cm (5' 6\") Weight: 119.7 kg (264 lb)  Estimated body mass index is 42.61 kg/m  as calculated from the following:    Height as of this encounter: 1.676 m (5' 6\").    Weight as of this encounter: 119.7 kg (264 lb).      Wt Readings from Last 2 Encounters:   10/25/22 119.7 kg (264 lb)   07/13/22 (!) 175.9 kg (387 lb 12.8 oz)       Gen: AAOX2-3, NAD, somewhat forgetful and confused  Resp: CTA B/L, normal WOB  CVS: RRR, no murmur  Abd/GI: Soft, non-tender. BS- normoactive.    Skin: Warm, dry no rashes  MSK: Trace-1+ pedal edema  Neuro- CN- intact. No focal deficits.      Data   Recent Labs   Lab 10/27/22  0900 10/26/22  0943 10/25/22  0945 10/24/22  0752    [phone]   NA  --   --   --  136   POTASSIUM  --  3.5 3.4 3.6  3.5   CHLORIDE  --   --   --  103   CO2  --   --   --  26   BUN  --   --   --  16   CR  --   --   --  0.76   ANIONGAP  --   --   --  7   ASHIA  --   --   --  8.6   GLC  --   --   -- "  87       No results found for this or any previous visit (from the past 24 hour(s)).  Medications     - MEDICATION INSTRUCTIONS -         ARIPiprazole  10 mg Oral QPM     aspirin  325 mg Oral Daily     atorvastatin  40 mg Oral QPM     enoxaparin ANTICOAGULANT  40 mg Subcutaneous Q12H     furosemide  20 mg Oral BID     miconazole   Topical BID     multivitamin w/minerals  1 tablet Oral Daily     OLANZapine  2.5 mg Oral At Bedtime     polyethylene glycol  17 g Oral Daily     potassium chloride  20 mEq Oral BID     senna-docusate  1-2 tablet Oral BID     sertraline  50 mg Oral Daily     sodium chloride (PF)  3 mL Intracatheter Q8H     spironolactone  12.5 mg Oral Daily

## 2022-10-29 NOTE — PLAN OF CARE
Goal Outcome Evaluation:    DATE & TIME: 10/29/22 6216-6412  Cognitive Concerns/ Orientation: A&Ox3 disoriented to time. Cooperative, but can get anxious at times. Calls out occasionally remind to use call light  BEHAVIOR & AGGRESSION TOOL COLOR: Green     ABNL VS/O2: VSS, RA   MOBILITY: Ax2 with lift to chair for meals. Up to chair x 1 today; T/R q2hr.   PAIN MANAGMENT: denies  DIET: Regular; poor appetite.  BOWEL/BLADDER: incontinent of B&B; smear of old vaginal blood noted this shift.  Did have 2 smear of BM this morning and a medium loose stool this afternoon prior to going down to xray.  ABNL LAB/BG: BMP wnl  DRAIN/DEVICES: PIV/SL LFA  SKIN: Pale, dry and flaky. Blanchable redness to coccyx, perineum and buttock. BLE patty,  Miconazole powder applied to abdominal folds and triad paste at a small midline open area; wound to R flank, dressing changed;  Large purple bruise to R. Upper arm. On pulsate mattress.   TESTS/PROCEDURES: abdominal and chest xray this afternoon  D/C DATE: Pending LTC placement, SW is following.   Discharge Barriers: Placement.  OTHER: at 1420 patient became suddenly nauseated, vomited brown and yellow fluid x 3; afebrile but had rigors, fingers were purple.  Zofran given.  MD informed and ordered xrays.  When asked about possible abdominal pain, patient stated very little.  Refused miralax this morning and senna.  Oxygen applied for RA sat of 88%.  In radiology currently for xray.

## 2022-10-30 NOTE — PROVIDER NOTIFICATION
Rash of L thigh now expanded to L lower leg.  Also note increase in R thigh rash to R butt.  Cheryl paged Dr. Lee.  He ordered one time benadryl IV dose.

## 2022-10-30 NOTE — PROGRESS NOTES
[facility]    Medicine Progress Note - Hospitalist Service        Date of Admission:  7/13/2022  3:02 PM    Assessment & Plan:   Cristy Bailey is a 77 year old female with a PMHx significant for morbid obesity; HTN; and HLD; who presented to [facility] 7/13/2022 with complete left-sided paralysis, left-sided facial droop, and difficulty speaking. CTA head remarkable for right MCA occlusion. She was given tenecteplase and subsequently transferred to [facility] 7/13/2022 for thrombectomy.      Hospital stay complicated by development and progression of flank wound.  This subsequently required surgical debridement and placement of wound vac (8/2) that was subsequently removed (8/22).      Prolonged hospitalization related todifficulties finding LTC placement.      Acute R MCA ischemic stroke with edema and right to left midline shift - s/p tenecteplase and subsequent R MCA mechanical thrombectomy 7/13/22  - Presented to [facility] 7/13 with complete paralysis, left-sided facial droop, and aphasia. Code stroke initiated. Head CT 7/13 showed signs of an evolving R MCA infarct. CTA head 7/13 showed a right carotid terminus occlusion, right middle cerebral artery M1 segment occlusion with poor opacification of the more distal right MCA branches/poor collateral flow. CTA neck 7/13 negative for acute occlusions. Pt given tenecteplase 7/13 at 13:11. Noted to be agitated and was subsequently intubated given need for procedure.   - Subsequently transferred to [facility] 7/13/2022 where she underwent above procedure.   - Extubated 7/14. Head CT 7/14 showed evolution of acute infarct involving the R MCA distribution with increased swelling, cortical effacement, and new mild right-to-left midline shift; questionable hypoattenuation involving the bilateral occipital lobes which could be artifactual.  Echo 7/14 showed LVEF 50%, grade 1 diastolic dysfunction.   - Started ASA and atorvastatin per stroke  team.   - Repeat head CT 7/15 showed evolving R MCA stroke with areas of edema, overall stable.  -- Continue  mg daily, atorvastatin 40 mg daily.  -- Goal SBP <140/90 and met   -- Monitored on telemetry for first several wks of hospital stay without abnl rhythm so will not order 30d cardiac event monitor at discharge per neurology recommendations (from 10/18)  -- Follow up with [facility] in 6-8 wks     Neuro status stable    Nausea  -Patient reporting nausea today.  Endorses mild abdominal pain.  -X-ray abdomen done yesterday without evidence of bowel obstruction, patient had a bowel movement overnight  -Monitor today, if nausea persistent or worse or evidence of worsening abdominal pain we will get a CT abdomen.    Left thigh rash  -Noticed overnight, involves more than half of the lateral aspect and a small area of the medial aspect.  No other body area involved  -Likely this is local, doubt systemic allergic reaction, monitor for now     Anxiety  Cognitive impairment with intermittent situational confusion  Metabolic encephalopathy, resolved  - On no psychiatric medications PTA.  - Pt with issues with anxiety and intermittent situational confusion this hospitalization.    - Seen by Psychiatry this hospitalization.  - Started on multiple medications this hospitalization including quetiapine, olanzapine, sertraline, mirtazapine, PRN hydroxyzine, PRN lorazepam.  - Medication regimen was adjusted/titrated. Mirtazapine, PRN lorazepam and PRN hydroxyzine subsequently stopped. See previous progress notes for more details.  - PRN ativan stopped on 08/27/22 as leading to increased confusion. Hydroxyzine stopped as well.   -- Continue aripiprazole 10 mg qpm; olanzapine 2.5 mg at bedtime; sertraline 50 mg daily  -- Continue PRN olanzapine 5 mg q6h     Lower back/R flank wound/abscess, suspect dt pressure type injury - s/p surgical debridement on 7/30/2022 and placement of wound vac on 8/2/2022 (subsequently removed  8/22/2022).  OA  Hx of bilateral hip replacements  Hx of chronic neck and back pain  -  Patient has chronic back pain, reports having it over the last 2 years. MRI L-spine in 2018 showed multilevel degenerative changes with mild central stenosis.  - Pt with significant back pain early on in hospital stay. Was requiring IV hydromorphone.  Dose had to be decreased dt somnolence.  - During this stay, patient was noted with 5-6cm by 2-3cm wound with swelling/fluid/blistering in a fold of her lower back, did not appear infected; this seemed to be the source of her pain. Padded dressing placed and WOC RN ordered. Pain initially improved.   - Was started on a course of amoxicillin-clavulanate on 7/24.   - On 7/26, was re-evaluated by WOC RN. Wound appeared more erythematous and purulence expressed. Worsening with shear stress from lift. Procal <0.05, WBC WNL. Abx changed to IV clindamycin.  - Wound cultures obtained. On 7/28, wound grew proteus and Staph aureus (MRSA neg). US neg for abscess.   - Lost IV access on 7/28 so abx were changed to oral clindamycin and oral levaquin. IV access re-established but was continued on oral abx.  - General surgery consulted, ultimately underwent excisional debridement of R flank abscess in OR on 7/30. Intraop cultures showed polymicrobial growth with proteus mirabilis x2 strains (both pan-sensitive), corynebacterium striatum and enterococcus faecalis.  - Wound vac placed per general surgery on 8/2.  -ID consulted on 8/2 and abx narrowed to amoxicillin-clavulanate alone, completed an additional 5 days of treatment on 8/7.   - Wound vac removed 8/22.  -- Continue local wound cares per WOC RN, follows weekly  -- Continue PRN oxycodone; minimize opioids as able.  -- Continue regular repositioning  -- Management of other pressure injuries as below     Volume overload secondary to acute diastolic CHF exacerbation, resolved  Essential hypertension  * Not on/needing meds PTA.   * As above, echo  this stay showed EF 50% with grade I diastolic dysfunction; RV not well visualized but appeared mild-moderately dilated with global systolic function probably mildly reduced.   * BPs were initially soft this stay and required IVFs. BPs improved.   * Subsequently developed volume overload with elevated BP's and diuresed with IV furosemide, subsequently transitioned to PO furosemide.  * Issues with recurrent hypokalemia on furosemide; noted often refusing potassium.  * Started on lisinopril, but subsequently stopped due to soft BP's on furosemide.  * Furosemide increased and spironolactone added 9/3.  -- Continue furosemide 20 mg BID;   -- has not been getting aldactone for few days because soft bp and parameters, resume aldactone 12.5mg daily with parameters  - potassium 4.1 on 10/29    Severe malnutrition related to acute and chronic medical issues  Significant weight loss this hospitalization  - Nutritionist following. Appetite poor this stay, needing encouragement to take po.   -There was some discussion previously about needing feeding support, but noted that patient does not want to have a feeding tube.  - As of 10/19, has had 107 lb weight loss since admit (374 lbs on 7/14; 267 lbs 10/19); noted some contribution from diuresis for CHF.  -- Continue to encourage oral intake  -- Continue supplements  -- Patient does not want a feeding tube and her health care agent Leo agrees with this, consistent with previous wishes     Vaginal bleeding  Abnormal endometrial thickening on US  - Pt with vaginal bleeding intermittently this hospitalization.  - Pelvic US 10/8 showed abnormal endometrial thickening to 14 mm.   - Gyn consulted 10/9 and recommend outpatient evaluation.  - Hgb overall stable.  -- Follow-up with Gyn outpatient for endometrial sampling in the near future     Deep tissue pressure injury (DTPI) left medial thigh/groin region, hospital acquired, progressed to HAPI stage 2 pressure injury; related to  purwick external catheter tubing (not the purwick device itself)  Pressure injuries, bilateral buttocks  - DTPI left medial thigh/groin region noted this hospitalization. WOC RN consulted.   - Pressure injuries in bilateral buttocks noted 9/2. Seen by WOC RN.  -- Continue local wound cares per WOC RN  -- Continue regular repositioning     Dyslipidemia  - FLP this stay showed tot cholest 163, HDL 47, LDL 91, .   - Started on atorvastatin 40 mg daily  -- Continue atorvastatin 40 mg daily     Chronic bilateral LE edema with chronic venous stasis dermatitis and chronic skin changes  Hx of LLE cellulitis  - Was hospitalized in 11/2021 for sepsis dt to pneumonia and LLE cellulitis.   - During this stay, BLE noted to be patty and edematous, no unilateral leg swelling appreciated; LLE had patchy areas of erythema, no fluctuance, no painful areas with palpation; legs warm to touch. Lymphedema consulted.   -- Cont LE elevation, lymphedema wraps  -- Continue diuretic (as above)     Hypokalemia, hypophosphatemia.  - Potassium and phosphorus low at times this hospitalization. Potassium low related to diuretics. Treated with replacement.  - increased scheduled KCl to 20mEq BID on 10/24 since was not getting aldactone  - discontinue electrolyte protocols on 10/24 to minimize frequent daily blood draws since have been stable over the last several days  - Potassium normal today      Intertriginous dermatitis  - Chronic and stable.  -- Continue clotrimazole powder     Constipation  -- Continue scheduled senna-docusate, polyethylene glycol, PRN bisacodyl     Suspected meningioma left parietal region, incidentally seen on admission CT on 7/13/2022  - Head CT 7/13 showed a calcified extra-axial mass overlying the left parietal region measuring 1.4 cm, potentially representing a meningioma.  -- Will need serial monitoring OP after discharge     Suspected sleep apnea  - O2 drop to mid 80s overnight 8/14. Briefly placed on 4LPM,  which she then removed and then slept okay with sats in 90s thereafter.  -- Consider sleep study as outpatient     Morbid obesity with significant weight loss this hospitalization  - BMI 59 on admission.  - Significant weight loss this hospitalization as above (107 lbs as of 10/19). BMI 43.2 on 10/20.  -- Continue aggressive dietary and lifestyle modifications      Goals of care  Failure to thrive  - During this hospitalization, discussed concern about overall prognosis, with her eating 0-25% of her meals and nutrition recommendations for alternative forms of nutrition; at that time, she had lost 70 lbs since admit (some of that was probably related to diuresis and some related to variable weights in hospital, but still significant).   - Patient and significant other/HCA Leo, reiterated her wishes for DNR/DNI and no feeding tubes.  - Palliative care met with Leo 9/28 and he did not want hospice.  -- Continue restorative cares  -- Pt is DNR/DNI, no tube feedings     DISPOSITION  -- Plan LTC placement once facility available and still looking         OTHER RESOLVED/CHRONIC ISSUES:  Urinary retention, resolved  * Retaining urine on 8/14. UA WNL, not worrisome for infection.  * Requiring straight cath x1 on 8/20 PM.  - Subsequently good UOP, no longer requiring straight cath. No evidence of obstruction on PVR.      Dysphagia due to CVA, resolved  - Seen by SLP and noted with dysphagia. Initially required some intermittent fluid boluses.   - Was eventually able to advance to regular diet w/thin liquids.     Prolonged QTc.  - EKG on 7/13 showed QTc 494.   - Repeat EKG on 7/30 (while on levofloxacin) showed QTc stable at 475. Has since completed course of abx as below.   Had been monitoring on telemetry this stay. No concerning findings so tele was ultimately dc'd 8/19.     Anemia, suspect dilutional component, resolved  - Hgb normal on admit. Hgb 11.5 on 7/16. No overt clinical signs of major bleeding.  - Hgb  "subsequently stable and later normalized.          Diet: Advance Diet as Tolerated  Regular Diet Adult Thin Liquids (level 0) (Upright position, alert, and assist as needed)  Room Service     DVT Prophylaxis: Pneumatic Compression Devices   [place] Catheter: Not present  Code Status: No CPR- Do NOT Intubate     Disposition Plan      Expected Discharge Date: 11/02/2022    Discharge Delays: Placement - LTC  *Medically Ready for Discharge    Discharge Comments: Multiple referrerals out  Did receive her 2nd covid shot  LTC placement      Entered: Chris Lee MD 10/30/2022, 9:32 AM            The patient's care was discussed with the Bedside Nurse and Patient.    Chris Lee MD  Hospitalist Service  [facility]  Text Page 7AM-6PM  Securely message with the Vocera Web Console (learn more here)  Text page via Watchup Paging/Directory    ______________________________________________________________________    Interval History   Patient had 1 episode of emesis yesterday and reports mild nausea this morning.  No emesis today.  Endorses mild lower abdominal pain, have 1 bowel movement overnight.    Data reviewed today: I reviewed all medications, new labs and imaging results over the last 24 hours. I personally reviewed no images or EKG's today.    Physical Exam   Vital signs:  Temp: 98.4  F (36.9  C) Temp src: Axillary BP: 91/56 Pulse: 97   Resp: 20 SpO2: 95 % O2 Device: None (Room air) Oxygen Delivery: 2 LPM Height: 167.6 cm (5' 6\") Weight: 119.7 kg (264 lb)  Estimated body mass index is 42.61 kg/m  as calculated from the following:    Height as of this encounter: 1.676 m (5' 6\").    Weight as of this encounter: 119.7 kg (264 lb).      Wt Readings from Last 2 Encounters:   10/25/22 119.7 kg (264 lb)   07/13/22 (!) 175.9 kg (387 lb 12.8 oz)       Gen: AAOX2-3, NAD, somewhat forgetful and confused  Resp: CTA B/L, normal WOB  CVS: RRR, no murmur  Abd/GI: Soft, minimal left lower quadrant " tenderness, no guarding or rebound.    Skin: Warm, dry no rashes  MSK: Trace-1+ pedal edema  Neuro- CN- intact. No focal deficits.      Data   Recent Labs   Lab 10/29/22  0915 10/27/22  0900 10/26/22  0943 10/25/22  0945 10/24/22  0752   PLT  --  278 262 295 266     --   --   --  136   POTASSIUM 4.1  --  3.5 3.4 3.6  3.5   CHLORIDE 106  --   --   --  103   CO2 23  --   --   --  26   BUN 14  --   --   --  16   CR 0.71  --   --   --  0.76   ANIONGAP 8  --   --   --  7   ASHIA 8.6  --   --   --  8.6   GLC 91  --   --   --  87       Recent Results (from the past 24 hour(s))   XR Abdomen 2 Views    Narrative    EXAM: XR ABDOMEN 2 VIEWS  LOCATION: [facility]  DATE/TIME: 10/29/2022 3:46 PM    INDICATION: emesis; r o ileus obstruction  COMPARISON: None.      Impression    IMPRESSION: Air-filled loops of small and large bowel. Normal bowel gas pattern versus developing ileus. Postoperative changes of bilateral hip arthroplasty. No free air.   XR Chest 1 View    Narrative    EXAM: XR CHEST 1 VIEW  LOCATION: [facility]  DATE/TIME: 10/29/2022 3:47 PM    INDICATION: emesis; r o aspiration  COMPARISON: 07/13/2022      Impression    IMPRESSION: The heart is borderline in size. Bibasilar atelectasis, greater on the left than the right. No pleural effusion or pneumothorax.     Medications     - MEDICATION INSTRUCTIONS -         ARIPiprazole  10 mg Oral QPM     aspirin  325 mg Oral Daily     atorvastatin  40 mg Oral QPM     enoxaparin ANTICOAGULANT  40 mg Subcutaneous Q12H     furosemide  20 mg Oral BID     miconazole   Topical BID     multivitamin w/minerals  1 tablet Oral Daily     OLANZapine  2.5 mg Oral At Bedtime     polyethylene glycol  17 g Oral Daily     potassium chloride  20 mEq Oral BID     senna-docusate  1-2 tablet Oral BID     sertraline  50 mg Oral Daily     sodium chloride (PF)  3 mL Intracatheter Q8H     spironolactone  12.5 mg Oral Daily

## 2022-10-30 NOTE — PLAN OF CARE
Goal Outcome Evaluation:       DATE & TIME: 10/29/22 5855-7040  Cognitive Concerns/ Orientation: A&Ox3 disoriented to time. Cooperative, but can get anxious at times. Calls out occasionally remind to use call light  BEHAVIOR & AGGRESSION TOOL COLOR: Green     ABNL VS/O2: VSS @2L, Stat @ high 90's   MOBILITY: Ax2 with lift to chair for meals.  T/R q2hr.   PAIN MANAGMENT: Denies  DIET: Regular; poor appetite.  BOWEL/BLADDER: incontinent of B&B, No vaginal blood noted this shift.   ABNL LAB/BG: NA  DRAIN/DEVICES: PIV/SL   SKIN: Pale, dry and flaky. Blanchable redness to coccyx, perineum and buttock. BLE patty,  Miconazole powder applied to abdominal folds and triad paste at a small midline open area; wound to R flank, dressing changed yesterday;  Large purple bruise to R. Upper arm. On pulsate mattress.   TESTS/PROCEDURES: XR done of chest and abdomin. Due to emesis and respiration issues.  D/C DATE: Pending LTC placement, SW is following.   Discharge Barriers: Placement.  OTHER: Pt has been stable this shift. No emesis. FITZGERALD but stats over 90%.

## 2022-10-30 NOTE — PLAN OF CARE
Goal Outcome Evaluation:    DATE & TIME: 10/30/22 [id number]  Cognitive Concerns/ Orientation: A&Ox3 looks on board for time. Cooperative, but can get anxious at times. Calls out occasionally remind to use call light  BEHAVIOR & AGGRESSION TOOL COLOR: Green     ABNL VS/O2: VSS RA   MOBILITY: declined chair today due to nausea;   T/R q2hr.   PAIN MANAGMENT: tender LLQ at times  DIET: Regular; poor appetite. Nausea, zofran IV given this morning, refused breakfast, taking sips of water and gingerale, couple bites of cereal this afternoon.  BOWEL/BLADDER: incontinent of medium brown BM, no void, bladder scan 42 ml, fluids encouraged.  ABNL LAB/BG: NA  DRAIN/DEVICES: PIV/SL LFA  SKIN: New rash of L and R thigh, pink; rash this afternoon extending now to LLE and R hip; MD informed and IV benadryl x 1 given; will continue to monitor.  Patient denies any itchiness.  Miconazole powder applied to abdominal folds and triad paste at a small midline open area. Wound to R flank, dressing changed;  Large purple bruise to R. Upper arm. On pulsate mattress.   TESTS/PROCEDURES:   D/C DATE: Pending LTC placement, SW is following.   Discharge Barriers: Placement.  OTHER: significant other updated by writer this afternoon.  Patient declined her KCL, ASA, MVI,  miralax and senna today due to nausea and size of pills.

## 2022-10-30 NOTE — PLAN OF CARE
Goal Outcome Evaluation:  DATE & TIME: 10/29/22 [id number]  Cognitive Concerns/ Orientation: A&Ox3 disoriented to time. Cooperative, but can get anxious at times. Calls out occasionally remind to use call light  BEHAVIOR & AGGRESSION TOOL COLOR: Green     ABNL VS/O2: VSS @2L, Stat @ high 90's   MOBILITY: Ax2 with lift to chair for meals.  T/R q2hr.   PAIN MANAGMENT: denies  DIET: Regular; poor appetite.  BOWEL/BLADDER: incontinent of B&B, small BM X1 and moderate vaginal blood noted.   ABNL LAB/BG: NA  DRAIN/DEVICES: PIV/SL   SKIN: Pale, dry and flaky. Blanchable redness to coccyx, perineum and buttock. BLE patty,  Miconazole powder applied to abdominal folds and triad paste at a small midline open area; wound to R flank, dressing changed;  Large purple bruise to R. Upper arm. On pulsate mattress.   TESTS/PROCEDURES: XR done of chest and abdomin. Due to emesis and respiration issues.  D/C DATE: Pending LTC placement, SW is following.   Discharge Barriers: Placement.  OTHER: Pt has been stable this shift. No emesis. FITZGERALD but stats over 90%.

## 2022-10-31 NOTE — PROVIDER NOTIFICATION
MD Notification    Notified Person: MD    Notified Person Name: Rosa     Notification Date/Time: 10/31/2022    Notification Interaction: vocera     Purpose of Notification: Hi, just clarifying IV fluid order. You want patient to receive 500 mL total of IV fluids over 2 hours? (250 mL/hr)? Thanks!       Orders Received: yes     Comments:

## 2022-10-31 NOTE — PLAN OF CARE
Goal Outcome Evaluation:       DATE & TIME: 10/30/22 [id number]  Cognitive Concerns/ Orientation: A&Ox3 Disoriented to time. Cooperative, but can get anxious at times. Calls out occasionally remind to use call light  BEHAVIOR & AGGRESSION TOOL COLOR: Green     ABNL VS/O2: VSS RA.   MOBILITY: Lift, Assist X2. T/R q2hr.   PAIN MANAGMENT: tender LLQ at times  DIET: Regular; poor appetite. Sips of water only with meds. No Nausea this shift. BOWEL/BLADDER: Incontinent, no void, no BM. No vaginal blood noted.  fluids encouraged.  ABNL LAB/BG: NA  DRAIN/DEVICES: PIV/SL  SKIN: Marked new rash of L and R thigh, pink; rash yesterday afternoon extending now to LLE and R hip; MD aware. will continue to monitor.  Patient denies any itchiness. Miconazole powder applied to abdominal folds and triad paste at a small midline open area. Large purple bruise to R. Upper arm. On pulsate mattress.   TESTS/PROCEDURES: NA  D/C DATE: Pending LTC placement, SW is following.   Discharge Barriers: Placement.  OTHER: Rash on LE marked to keep track. Pt informed the her legs feel worm so blankets removed and ice packs placed.

## 2022-10-31 NOTE — PHARMACY-VANCOMYCIN DOSING SERVICE
"Pharmacy Vancomycin Initial Note  Date of Service 2022  Patient's  1945  77 year old, female    Indication: Skin and Soft Tissue Infection    Current estimated CrCl = Estimated Creatinine Clearance: 90.2 mL/min (based on SCr of 0.71 mg/dL).    Creatinine for last 3 days  10/29/2022:  9:15 AM Creatinine 0.71 mg/dL    Recent Vancomycin Level(s) for last 3 days  No results found for requested labs within last 72 hours.      Vancomycin IV Administrations (past 72 hours)      No vancomycin orders with administrations in past 72 hours.                Nephrotoxins and other renal medications (From now, onward)    Start     Dose/Rate Route Frequency Ordered Stop    10/31/22 1130  piperacillin-tazobactam (ZOSYN) 3.375 g vial to attach to  mL bag        Note to Pharmacy: For [facility], [facility] and [facility]: For Zosyn-naive patients, use the \"Zosyn initial dose + extended infusion\" order panel.    3.375 g  over 30 Minutes Intravenous EVERY 6 HOURS 10/31/22 1113      22 0800  [Held by provider]  furosemide (LASIX) tablet 20 mg        (Held by provider since Sun 10/30/2022 at 1535 by Chris Lee MD.Hold Reason: Change in Vitals)    20 mg Oral 2 TIMES DAILY (Diuretics and Nitrates) 22 1655      22 0000  furosemide (LASIX) 40 MG tablet         40 mg Oral DAILY 22 1121      22 0000  lisinopril (ZESTRIL) 10 MG tablet         10 mg Oral DAILY 22 1121            Contrast Orders - past 72 hours (72h ago, onward)    None          [employer] Prediction of Planned Initial Vancomycin Regimen  Loading dose: N/A  Regimen: 1000 mg IV every 12 hours.  Start time: 11:59 on 10/31/2022  Exposure target: AUC24 (range)400-600 mg/L.hr   AUC24,ss: 583 mg/L.hr  Probability of AUC24 > 400: 78 %  Ctrough,ss: 17.8 mg/L  Probability of Ctrough,ss > 20: 43 %  Probability of nephrotoxicity (Lodise TOMÁS ): 14 %        Plan:  1. Start vancomycin  1000 mg IV q12h.   a. ADDENDUM: Given new PRECIOUS, will switch " patient to intermittent dosing. Patient has gotten 1000 mg x 1 dose.   2. Vancomycin monitoring method: AUC  3. Vancomycin therapeutic monitoring goal: 400-600 mg*h/L  4. Pharmacy will check vancomycin levels as appropriate in 1-3 Days.    5. Serum creatinine levels will be ordered daily for the first week of therapy and at least twice weekly for subsequent weeks.      Peggy Martin RPH

## 2022-10-31 NOTE — PROVIDER NOTIFICATION
MD Notification    Notified Person: MD    Notified Person Name: Rosa     Notification Date/Time: 10/31/2022, 1111    Notification Interaction: Vuzit messaging     Purpose of Notification: Hi, critical lab. Procalcitonin 20.43. Bolus complete but BP still very low. last check 62/41. patient says she still feels the same. Suggestions? Thanks!        Orders Received:  -stat 500 ml bolus ordered   -labs ordered   -transfer to        Comments:

## 2022-10-31 NOTE — PROGRESS NOTES
Significant other, Leo updated on patient transfer to ICU. Encouraged family member to call ICU unit for further questions.     Luci Lizarraga RN on 10/31/2022 at 3:02 PM

## 2022-10-31 NOTE — PLAN OF CARE
Goal Outcome Evaluation:  DATE & TIME: 10/30/22 [id number]  Cognitive Concerns/ Orientation: A&Ox3 Disoriented to time. Cooperative, but can get anxious at times. Calls out occasionally remind to use call light  BEHAVIOR & AGGRESSION TOOL COLOR: Green     ABNL VS/O2: VSS RA.   MOBILITY: Lift, Assist X2. T/R q2hr.   PAIN MANAGMENT: tender LLQ at times  DIET: Regular; poor appetite. Sips of water only with meds. Nausea, zofran IV given this shift. Emesis of approximately 20ml clear liquid with food crumbs.   BOWEL/BLADDER: Incontinent, no void not BM. Small vaginal blood noted. bladder scan 57 ml @2030, fluids encouraged.  ABNL LAB/BG: NA  DRAIN/DEVICES: PIV/SL  SKIN: Marked new rash of L and R thigh, pink; rash this afternoon extending now to LLE and R hip; MD aware. will continue to monitor.  Patient denies any itchiness. Miconazole powder applied to abdominal folds and triad paste at a small midline open area. Large purple bruise to R. Upper arm. On pulsate mattress.   TESTS/PROCEDURES: NA  D/C DATE: Pending LTC placement, SW is following.   Discharge Barriers: Placement.  OTHER: Rash on LE marked to keep track. Pt informed the her legs feel worm so blankets removed and ice packs placed. Zofran effective for Nausea.

## 2022-10-31 NOTE — CONSULTS
[facility]    Infectious Disease Consultation     Date of Admission:  7/13/2022  Date of Consult (When I saw the patient): 10/31/22    Assessment:   77 YF with morbid obesity, HTN who has had prolonged hospitalization since 7/13 initially with CVA requiring thrombolytic therapy and thrombectomy with multiple complications of CHF and infected pressure wounds requiring debridement, who has now developed hypotension with sepsis syndrome. She has lower extremity erythema with soft tissue infection    -Bilateral LE erythema and cellulitis, some tenderness left ankle as well  -sepsis , will need pan cultures  -Right flank/back wounds due to pressure injury with infection. S/p surgical debridement with polymicrobial cx-MSSA, proteus mirabilis and E.fecalis. Has jade treated  -Recent R MCA CVA s/p thrombolytic therapy and mechanical thrombectomy on 7/13 with resolution of neurological deficits  -LE lymphedema  -Decubitus ulcers  -Chronic medical conditions - Obesity, diastolic CHF, HTN, venous stasis with stasis dermatitis, bilateral hip replacement     Recommendations:  1. Patient is being transferred to the ICU for pressors  2. Pan culture, blood cxs X 2 sets, UA/cx  3. CXR  4. Vancomycin, zosyn  5 . Follow clinical status and labs  ID will continue to follow  Mere Demarco MD    Reason for Consult    I was asked to evaluate this patient for sepsis and hypotension    Primary Care Physician   Manoj Daley    Chief Complaint   Sepsis, hypotension    History is obtained from the patient and medical records    History of Present Illness   Cristy Bailey is a 77 year old female with morbid obesity, chronic LE stasis , lymphedema, diastolic CHF and HTN who has been hospitalized since 7/13 due to an acute R MCA CVA which has been managed with  tenecteplase and subsequent R MCA mechanical thrombectomy 7/13/22 with resolution of neurological deficits. She has had a complicated and prolonged  hospital course since that time and ID has been asked to see patient due to new onset hypotension and concern for infection.     ID had evaluated her briefly in August when she developed a right flank ./ lower back pressure injury which has required surgical debridement on 7/30. Cxs were polymicrobial with proteus mirabilis, MSSA and E.fecalis. She was treated with antibiotics at that time      She has had difficulty with placement. She has new developed lower extremity erythema. She is afebrile but has leukocytosis of 14.7 k with CRP elevation to 379, elevated procalcitonin and hypotension requiring pressors. She has remained off antibiotics since 08/07 and has now been initiated on Vancomycin and Zosyn    Patient complains of some pain in her legs and left ankle, otherwise denies new symptoms of abdominal pain, nausea, vomiting, diarrhea or urinary symptoms  Past Medical History   I have reviewed this patient's medical history and updated it with pertinent information if needed.   Past Medical History:   Diagnosis Date     Arthritis     hip     HTN (hypertension) 1/2010      Leg weakness 3/24/2015     Lyme disease 1980'S AND 2004    lYME DZ LIKE SXS RESPONDED TO ABX       Past Surgical History   I have reviewed this patient's surgical history and updated it with pertinent information if needed.  Past Surgical History:   Procedure Laterality Date     ARTHROPLASTY HIP Right 7/30/2018    Procedure: ARTHROPLASTY HIP;  Right total hip arthroplasty;  Surgeon: Bry Garcia MD;  Location:  OR     ARTHROPLASTY HIP Left 2/12/2019    Procedure: Left total hip arthroplasty ([employer] 60 mm acetabulum with 2 screws; #15 standard neck Synergy stem; +0 neck 36 mm head) (extra difficult case because of her high BMI and deep subcutaneous fatty layer resulting in the operative time at least twice as long from typical operative time);  Surgeon: Chavo Rodriguez MD;  Location:  OR     BIOPSY OF UTERUS LINING   3/2010    negative.      COLONOSCOPY  2/21/10    benign findings. advised 10 yr f/u.      INCISION AND DRAINAGE HIP, COMBINED Right 8/29/2018    Procedure: COMBINED INCISION AND DRAINAGE HIP;  Incision and debridement, right hip ;  Surgeon: Bry Garcia MD;  Location: [facility] OR     IR CAROTID CEREBRAL ANGIOGRAM BILATERAL  7/13/2022     IRRIGATION AND DEBRIDEMENT DECUBITUS WOUND, COMBINED Right 7/30/2022    Procedure: IRRIGATION AND DEBRIDEMENT, PRESSURE ULCER on right flank;  Surgeon: Mariah Black MD;  Location: [facility] OR     ORTHOPEDIC SURGERY Right 08/29/2018    Right hip I & D 8/26/18, Right JENAE, DOS 7/30/2018, Dr. Garcia. [facility]     ZZC C-SEC ONLY,PREV C-SEC      c-sec x 3        Prior to Admission Medications   Prior to Admission Medications   Prescriptions Last Dose Informant Patient Reported? Taking?   ACETAMINOPHEN PO Past Week at prn  Yes Yes   Sig: Take by mouth every 8 hours as needed for pain   albuterol (PROVENTIL) (2.5 MG/3ML) 0.083% neb solution Not Taking at Unknown time  No No   Sig: Take 1 vial (2.5 mg) by nebulization every 6 hours as needed for shortness of breath / dyspnea or wheezing   Patient not taking: Reported on 7/13/2022   clotrimazole (LOTRIMIN) 1 % external cream Unknown at Unknown time  No No   Sig: Apply topically 2 times daily      Facility-Administered Medications: None     Allergies   Allergies   Allergen Reactions     No Known Drug Allergies        Immunization History   Immunization History   Administered Date(s) Administered     COVID-19,PF,Pfizer 12+ Yrs (2022 and After) 08/28/2022, 09/20/2022     Influenza (High Dose) 3 valent vaccine 09/19/2012, 10/23/2015, 09/22/2017     Influenza (IIV3) PF 10/11/2008, 09/25/2014     TDAP Vaccine (Boostrix) 03/08/2010       Social History   I have reviewed this patient's social history and updated it with pertinent information if needed. Cristy Bailey  reports that she quit smoking about 38 years ago. She has never  used smokeless tobacco. She reports that she does not drink alcohol and does not use drugs.    Family History   I have reviewed this patient's family history and updated it with pertinent information if needed.   Family History   Problem Relation Age of Onset     Cerebrovascular Disease Father         -stroke at age 80     Family History Negative Mother          Diedn her 80's of unknown causes.  Pt otherwise unaware of her health hx.      Unknown/Adopted Mother      Diabetes Brother         (Christian)  of DM complications at age 50.     Alcohol/Drug Brother         Christian     C.A.D. Brother         Christian.      Heart Disease Brother         Christian--MI age 50.     Family History Negative Brother      Family History Negative Brother      Family History Negative Sister      Family History Negative Sister      Family History Negative Son      Family History Negative Daughter      Family History Negative Daughter        Review of Systems   The 10 point Review of Systems is negative other than noted in the HPI or here.     Physical Exam   Temp: 97.3  F (36.3  C) Temp src: Oral BP: (!) 67/43 Pulse: 85   Resp: 20 SpO2: 94 % O2 Device: None (Room air)    Vital Signs with Ranges  Temp:  [97.3  F (36.3  C)-99.1  F (37.3  C)] 97.3  F (36.3  C)  Pulse:  [83-86] 85  Resp:  [20] 20  BP: ()/(40-68) 67/43  SpO2:  [94 %-96 %] 94 %  264 lbs 0 oz  Body mass index is 42.61 kg/m .    GENERAL APPEARANCE:  Awake, morbidly obese, in no distress  EYES: Eyes grossly normal to inspection  NECK: no adenopathy  RESP: lungs clear   CV: regular rates and rhythm  ABDOMEN: obese, difficult to examine  MS: BL LE edema   SKIN: LE erythema bilaterally with some warmth    Data   All laboratory data reviewed  Component      Latest Ref Rng & Units 10/31/2022   CRP Inflammation      0.0 - 8.0 mg/L 379.0 (H)     Component      Latest Ref Rng & Units 10/31/2022   WBC      4.0 - 11.0 10e3/uL 14.7 (H)   RBC Count      3.80 - 5.20 10e6/uL 5.09    Hemoglobin      11.7 - 15.7 g/dL 14.9   Hematocrit      35.0 - 47.0 % 44.0   MCV      78 - 100 fL 86   MCH      26.5 - 33.0 pg 29.3   MCHC      31.5 - 36.5 g/dL 33.9   RDW      10.0 - 15.0 % 16.9 (H)   Platelet Count      150 - 450 10e3/uL 250        Microbiology  10/31 blood cx pending

## 2022-10-31 NOTE — PROGRESS NOTES
RRT called around 1200 today for continued hypotension after interventions. Patient received x2 500 ml boluses and still was maintaining a systolic BP of 60-70s before RRT was called. Patient was alert, but more quiet than baseline. Additional fluids and antibiotics ordered by hospitalist, and NP was called to come and assess the patient. Vitals checked frequently and tele applied to patient. Bruising to R. Hip was marked. Carrasquillo inserted per order for urine collection and closer monitoring. Additional labs collected. Patient was started on levophed gtt by RRT staff. Continuing to monitor patient closely until patient is transferred to the ICU.     Luci Lizarraga RN on 10/31/2022 at 2:17 PM

## 2022-10-31 NOTE — PROCEDURES
[facility]    Triple Lumen PICC Placement    Date/Time: 10/31/2022 6:15 PM  Performed by: Gabe Grey RN  Authorized by: Alirio Stevens MD   Indications: vascular access      UNIVERSAL PROTOCOL   Site Marked: Yes  Prior Images Obtained and Reviewed:  Yes  Required items: Required blood products, implants, devices and special equipment available    Patient identity confirmed:  Verbally with patient  NA - No sedation, light sedation, or local anesthesia  Confirmation Checklist:  Patient's identity using two indicators, relevant allergies, procedure was appropriate and matched the consent or emergent situation and correct equipment/implants were available  Time out: Immediately prior to the procedure a time out was called    Universal Protocol: the Joint Commission Universal Protocol was followed    Preparation: Patient was prepped and draped in usual sterile fashion       ANESTHESIA    Anesthesia: Local infiltration  Local Anesthetic:  Lidocaine 1% without epinephrine  Anesthetic Total (mL):  2      SEDATION    Patient Sedated: No        Preparation: skin prepped with 2% chlorhexidine  Skin prep agent: skin prep agent completely dried prior to procedure  Sterile barriers: maximum sterile barriers were used: cap, mask, sterile gown, sterile gloves, and large sterile sheet  Hand hygiene: hand hygiene performed prior to central venous catheter insertion  Type of line used: PICC  Catheter type: triple lumen  Lumen type: valved and power PICC  Lumen Identification: Purple, Red and White  Catheter size: 5 Fr  Brand: Bard  Lot number: [id number]  Placement method: venipuncture, MST and ultrasound  Number of attempts: 1  Difficulty threading catheter: no  Successful placement: yes  Orientation: right    Location: brachial vein (lateral)  Tip Location: SVC (Low)  Visible catheter length: 3  Total catheter length: 44  Internal Lumen Volume: 41 mL    Dressing and securement: blood  removed, blood cleaned with CHG, subcutaneous anchor securement system, alcohol impregnated caps, chlorhexidine disc applied and sterile dressing applied  Post procedure assessment: blood return through all ports and placement verified by 3CG technology  PROCEDURE   Patient Tolerance:  Patient tolerated the procedure well with no immediate complicationsDescribe Procedure: PICC line ordered STAT. Line placed w/o complication. Primary RN notified ok to use.

## 2022-10-31 NOTE — CODE/RAPID RESPONSE
[facility]    RRT Note  10/31/2022   Time Called: 12:16 PM    Code Status: No CPR- Do NOT Intubate    I was called to evaluate Cristy Bailey, who is a 77 year old female with a PMH significant for morbid obesiity, HTN, and HLD, who presented to [facility]13/2022 with complete left-sided paralysis, left-sided facial droop, and difficulty speaking. CTA head remarkable for right MCA occlusion. She was iven tenectaplace and subsequently transferred to [facility] 7/13/22 for thrombectomy. Hospital course complicated by development and progression of flank wound, requiring surgical debridement and placement of wound vac 8/2. Wound vac removed 8/22. Prolonged hospitalization due to difficulty finding LTC placement.     Assessment & Plan   Septic shock suspect 2/2 bilateral lower extremity cellulitis   Prior to RRT, Dr. Seals was notifed about patient's ongoing hypotension. He ordered 1500 ml NS bolus, initiated zosyn and vancomycin for new bilateral lower extremity cellulitis, and peripheral blood cultures. Upon arrival patient is alert, lying in bed. Lungs are clear, no rales, rhonchi, or wheezing, although auscultation may not be reliable due to patient's body habitus. No murmur, rub, or gallop. Bilateral lower extremities are red, edematous, warm to touch, cellulitic appearing. Right flank wound assessed, no purulent drainage, surrounding skin is macerated. Right hip bruised, and edematous. Denies any recent trauma.  Abdomen is obese, soft, non-tender.       INTERVENTIONS:  - 12- lead EKG  - peripheral levophed  - 50 mg solu-cortef  - transfer to ICU    Goals of Care   Discussed goals of care with patient, and she confirmed she wants to escalate care and transfer to ICU for vasopressor support. She confirmed she is a DNR/DNI. Attempted to call patient's significant other Leo, however unable to get in touch with them.     Discussed with and defer further cares to   Jeremy.       ISRAEL Lopez [facility]  Hospitalist Service - House SUSIE  Pager: [phone] (0u - 6p)      Physical Exam   Vital Signs with Ranges:  Temp:  [97.3  F (36.3  C)-99.1  F (37.3  C)] 97.3  F (36.3  C)  Pulse:  [83-86] 85  Resp:  [20] 20  BP: ()/(40-68) 67/43  SpO2:  [94 %-96 %] 94 %  I/O last 3 completed shifts:  In: 120 [P.O.:120]  Out: 20 [Emesis/NG output:20]              Physical Exam   General:  Ill appearing, pale. Not in acute respiratory distress. A/O x3, forgetful at times.  Skin:  Cool, clammy. Bilateral lower extremities are red, edematous, warm to touch, cellulitic appearing. Right flank wound assessed, no purulent drainage, surrounding skin is macerated. Right hip bruised, and edematous. Denies any recent trauma.   HEENT:  Normocephalic, atraumatic.  Neck:  Supple. Trachea midline.  Chest:  Breath sounds CTA and no increased work of breathing on room air.  Cardiovascular:  RRR, no rub or murmur. +3 bilateral lower extremity edema  Abdomen:  Obese, soft, non-tender.  Musculoskeletal:  Moves all four extremities. No muscle atrophy.  Neurological:    Psychiatric:  Affect and mood congruent.    Data     EKG:  Interpreted by Telly Cameron NP  Time reviewed: 13:29 PM  Symptoms at time of EKG: hypotension   Rhythm: Sinus rhythm with sinus arrythmia   Rate: 78  Axis: Normal  Ectopy: none  Conduction: normal  ST Segments/ T Waves: T wave inversion I, II, III, aVL, aVF, V2, V3 and V4  Q Waves: II, III and aVf  Comparison to prior: t wave abnormality, consider anterolateral ischemia  Clinical Impression: non-specific EKG with T-wave inversions in lead I, II, III, aVL, aVF, V2, V3.and V4    IMAGING: (X-ray/CT/MRI)   No results found for this or any previous visit (from the past 24 hour(s)).    CBC with Diff:  Recent Labs   Lab Test 10/31/22  0853 07/15/22  0501 07/14/22  0510   WBC 14.7*   < > 12.8*   HGB 14.9   < > 13.4   MCV 86   < > 93      < > 235   INR  --   --  1.07    < > =  values in this interval not displayed.      No results found for: RETICABSCT  No results found for: RETP    Comprehensive Metabolic Panel:  Recent Labs   Lab 10/31/22  1202      POTASSIUM 4.4   CHLORIDE 100   CO2 27   ANIONGAP 7   *   BUN 38*   CR 2.08*   GFRESTIMATED 24*   ASHIA 8.6         Time Spent on this Encounter     CRITICAL CARE TIME  I spent 70 minutes (12:20 PM - 1:30 PM) of critical care time on the unit/floor managing the care of Cristy Bailey. Upon evaluation, this patient had a high probability of imminent or life-threatening deterioration due to Septic Shock which required my direct attention, intervention, and personal management. 100% of my time was spent at the bedside counseling the patient and/or coordinating care regarding services listed in this note.

## 2022-10-31 NOTE — PROGRESS NOTES
[facility]    Medicine Progress Note - Hospitalist Service        Date of Admission:  7/13/2022  3:02 PM    Assessment & Plan:   Cristy Bailey is a 77 year old female with a PMHx significant for morbid obesity; HTN; and HLD; who presented to [facility] 7/13/2022 with complete left-sided paralysis, left-sided facial droop, and difficulty speaking. CTA head remarkable for right MCA occlusion. She was given tenecteplase and subsequently transferred to [facility] 7/13/2022 for thrombectomy.      Hospital stay complicated by development and progression of flank wound.  This subsequently required surgical debridement and placement of wound vac (8/2) that was subsequently removed (8/22).      Prolonged hospitalization related todifficulties finding LTC placement.    Bilateral lower extremity cellulitis, left greater than right  -Patient initially had what appeared like a rash involving the left thigh yesterday however now she has erythema involving both legs with the left being more intense  -The area of erythema around the left thigh however appears to be somewhat fading  -Erythematous area has been marked  -Worsening leukocytosis with elevated CRP  -Start IV Ancef 2 g every 8 hours  -Check procalcitonin  -Check CRP again tomorrow  -If no improvement in the next 24 hours, consult infectious disease.      Acute R MCA ischemic stroke with edema and right to left midline shift - s/p tenecteplase and subsequent R MCA mechanical thrombectomy 7/13/22  - Presented to [facility] 7/13 with complete paralysis, left-sided facial droop, and aphasia. Code stroke initiated. Head CT 7/13 showed signs of an evolving R MCA infarct. CTA head 7/13 showed a right carotid terminus occlusion, right middle cerebral artery M1 segment occlusion with poor opacification of the more distal right MCA branches/poor collateral flow. CTA neck 7/13 negative for acute occlusions. Pt given tenecteplase 7/13 at 13:11. Noted to  be agitated and was subsequently intubated given need for procedure.   - Subsequently transferred to [facility] 7/13/2022 where she underwent above procedure.   - Extubated 7/14. Head CT 7/14 showed evolution of acute infarct involving the R MCA distribution with increased swelling, cortical effacement, and new mild right-to-left midline shift; questionable hypoattenuation involving the bilateral occipital lobes which could be artifactual.  Echo 7/14 showed LVEF 50%, grade 1 diastolic dysfunction.   - Started ASA and atorvastatin per stroke team.   - Repeat head CT 7/15 showed evolving R MCA stroke with areas of edema, overall stable.  -- Continue  mg daily, atorvastatin 40 mg daily.  -- Goal SBP <140/90 and met   -- Monitored on telemetry for first several wks of hospital stay without abnl rhythm so will not order 30d cardiac event monitor at discharge per neurology recommendations (from 10/18)  -- Follow up with [facility] in 6-8 wks  -Neurologically has been stable    Nausea  -Patient has had intermittent nausea but claims it is much better today  -Denies abdominal pain  -Continue to monitor clinically    Volume overload secondary to acute diastolic CHF exacerbation, resolved  Essential hypertension  * Not on/needing meds PTA.   * As above, echo this stay showed EF 50% with grade I diastolic dysfunction; RV not well visualized but appeared mild-moderately dilated with global systolic function probably mildly reduced.   * BPs were initially soft this stay and required IVFs. BPs improved.   * Subsequently developed volume overload with elevated BP's and diuresed with IV furosemide, subsequently transitioned to PO furosemide.  * Issues with recurrent hypokalemia on furosemide; noted often refusing potassium.  * Started on lisinopril, but subsequently stopped due to soft BP's on furosemide.  * Furosemide increased and spironolactone added 9/3.  -Blood pressures have been soft intermittently since yesterday, also has  had drop in urine output, has not much oral intake  -Normal saline 500 cc bolus this morning  -Continue to hold Lasix and spironolactone at this time    Anxiety  Cognitive impairment with intermittent situational confusion  Metabolic encephalopathy, resolved  - On no psychiatric medications PTA.  - Pt with issues with anxiety and intermittent situational confusion this hospitalization.    - Seen by Psychiatry this hospitalization.  - Started on multiple medications this hospitalization including quetiapine, olanzapine, sertraline, mirtazapine, PRN hydroxyzine, PRN lorazepam.  - Medication regimen was adjusted/titrated. Mirtazapine, PRN lorazepam and PRN hydroxyzine subsequently stopped. See previous progress notes for more details.  - PRN ativan stopped on 08/27/22 as leading to increased confusion. Hydroxyzine stopped as well.   -- Continue aripiprazole 10 mg qpm; olanzapine 2.5 mg at bedtime; sertraline 50 mg daily  -- Continue PRN olanzapine 5 mg q6h     Lower back/R flank wound/abscess, suspect dt pressure type injury - s/p surgical debridement on 7/30/2022 and placement of wound vac on 8/2/2022 (subsequently removed 8/22/2022).  OA  Hx of bilateral hip replacements  Hx of chronic neck and back pain  -  Patient has chronic back pain, reports having it over the last 2 years. MRI L-spine in 2018 showed multilevel degenerative changes with mild central stenosis.  - Pt with significant back pain early on in hospital stay. Was requiring IV hydromorphone.  Dose had to be decreased dt somnolence.  - During this stay, patient was noted with 5-6cm by 2-3cm wound with swelling/fluid/blistering in a fold of her lower back, did not appear infected; this seemed to be the source of her pain. Padded dressing placed and WOC RN ordered. Pain initially improved.   - Was started on a course of amoxicillin-clavulanate on 7/24.   - On 7/26, was re-evaluated by WOC RN. Wound appeared more erythematous and purulence expressed.  Worsening with shear stress from lift. Procal <0.05, WBC WNL. Abx changed to IV clindamycin.  - Wound cultures obtained. On 7/28, wound grew proteus and Staph aureus (MRSA neg). US neg for abscess.   - Lost IV access on 7/28 so abx were changed to oral clindamycin and oral levaquin. IV access re-established but was continued on oral abx.  - General surgery consulted, ultimately underwent excisional debridement of R flank abscess in OR on 7/30. Intraop cultures showed polymicrobial growth with proteus mirabilis x2 strains (both pan-sensitive), corynebacterium striatum and enterococcus faecalis.  - Wound vac placed per general surgery on 8/2.  -ID consulted on 8/2 and abx narrowed to amoxicillin-clavulanate alone, completed an additional 5 days of treatment on 8/7.   - Wound vac removed 8/22.  -- Continue local wound cares per WOC RN, follows weekly  -- Continue PRN oxycodone; minimize opioids as able.  -- Continue regular repositioning  -- Management of other pressure injuries as below       Severe malnutrition related to acute and chronic medical issues  Significant weight loss this hospitalization  - Nutritionist following. Appetite poor this stay, needing encouragement to take po.   -There was some discussion previously about needing feeding support, but noted that patient does not want to have a feeding tube.  - As of 10/19, has had 107 lb weight loss since admit (374 lbs on 7/14; 267 lbs 10/19); noted some contribution from diuresis for CHF.  -- Continue to encourage oral intake  -- Continue supplements  -- Patient does not want a feeding tube and her health care agent Leo agrees with this, consistent with previous wishes     Vaginal bleeding  Abnormal endometrial thickening on US  - Pt with vaginal bleeding intermittently this hospitalization.  - Pelvic US 10/8 showed abnormal endometrial thickening to 14 mm.   - Gyn consulted 10/9 and recommend outpatient evaluation.  - Hgb overall stable.  -- Follow-up with  Gyn outpatient for endometrial sampling in the near future     Deep tissue pressure injury (DTPI) left medial thigh/groin region, hospital acquired, progressed to HAPI stage 2 pressure injury; related to purwick external catheter tubing (not the purwick device itself)  Pressure injuries, bilateral buttocks  - DTPI left medial thigh/groin region noted this hospitalization. WOC RN consulted.   - Pressure injuries in bilateral buttocks noted 9/2. Seen by WOC RN.  -- Continue local wound cares per WOC RN  -- Continue regular repositioning     Dyslipidemia  - FLP this stay showed tot cholest 163, HDL 47, LDL 91, .   - Started on atorvastatin 40 mg daily  -- Continue atorvastatin 40 mg daily     Chronic bilateral LE edema with chronic venous stasis dermatitis and chronic skin changes  Hx of LLE cellulitis  - Was hospitalized in 11/2021 for sepsis dt to pneumonia and LLE cellulitis.   - During this stay, BLE noted to be patty and edematous, no unilateral leg swelling appreciated; LLE had patchy areas of erythema, no fluctuance, no painful areas with palpation; legs warm to touch. Lymphedema consulted.   -- Cont LE elevation, lymphedema wraps  -- Continue diuretic (as above)     Hypokalemia, hypophosphatemia.  - Potassium and phosphorus low at times this hospitalization. Potassium low related to diuretics. Treated with replacement.  - increased scheduled KCl to 20mEq BID on 10/24 since was not getting aldactone  - discontinue electrolyte protocols on 10/24 to minimize frequent daily blood draws since have been stable over the last several days  - Potassium normal today      Intertriginous dermatitis  - Chronic and stable.  -- Continue clotrimazole powder     Constipation  -- Continue scheduled senna-docusate, polyethylene glycol, PRN bisacodyl     Suspected meningioma left parietal region, incidentally seen on admission CT on 7/13/2022  - Head CT 7/13 showed a calcified extra-axial mass overlying the left parietal  region measuring 1.4 cm, potentially representing a meningioma.  -- Will need serial monitoring OP after discharge     Suspected sleep apnea  - O2 drop to mid 80s overnight 8/14. Briefly placed on 4LPM, which she then removed and then slept okay with sats in 90s thereafter.  -- Consider sleep study as outpatient     Morbid obesity with significant weight loss this hospitalization  - BMI 59 on admission.  - Significant weight loss this hospitalization as above (107 lbs as of 10/19). BMI 43.2 on 10/20.  -- Continue aggressive dietary and lifestyle modifications      Goals of care  Failure to thrive  - During this hospitalization, discussed concern about overall prognosis, with her eating 0-25% of her meals and nutrition recommendations for alternative forms of nutrition; at that time, she had lost 70 lbs since admit (some of that was probably related to diuresis and some related to variable weights in hospital, but still significant).   - Patient and significant other/HCA Leo, reiterated her wishes for DNR/DNI and no feeding tubes.  - Palliative care met with Leo 9/28 and he did not want hospice.  -- Continue restorative cares  -- Pt is DNR/DNI, no tube feedings     DISPOSITION  -- Plan LTC placement once facility available and still looking         OTHER RESOLVED/CHRONIC ISSUES:  Urinary retention, resolved  * Retaining urine on 8/14. UA WNL, not worrisome for infection.  * Requiring straight cath x1 on 8/20 PM.  - Subsequently good UOP, no longer requiring straight cath. No evidence of obstruction on PVR.      Dysphagia due to CVA, resolved  - Seen by SLP and noted with dysphagia. Initially required some intermittent fluid boluses.   - Was eventually able to advance to regular diet w/thin liquids.     Prolonged QTc.  - EKG on 7/13 showed QTc 494.   - Repeat EKG on 7/30 (while on levofloxacin) showed QTc stable at 475. Has since completed course of abx as below.   Had been monitoring on telemetry this stay. No  "concerning findings so tele was ultimately dc'd 8/19.     Anemia, suspect dilutional component, resolved  - Hgb normal on admit. Hgb 11.5 on 7/16. No overt clinical signs of major bleeding.  - Hgb subsequently stable and later normalized.          Diet: Advance Diet as Tolerated  Regular Diet Adult Thin Liquids (level 0) (Upright position, alert, and assist as needed)  Room Service     DVT Prophylaxis: Pneumatic Compression Devices   [place] Catheter: Not present  Code Status: No CPR- Do NOT Intubate     Disposition Plan       Expected Discharge Date: 11/09/2022    Discharge Delays: Placement - LTC  *Medically Ready for Discharge    Discharge Comments: Multiple referrerals out  Did receive her 2nd covid shot  LTC placement      Entered: Chris Lee MD 10/31/2022, 9:56 AM            The patient's care was discussed with the Bedside Nurse and Patient.    Chris Lee MD  Hospitalist Service  [facility]  Text Page 7AM-6PM  Securely message with the Vocera Web Console (learn more here)  Text page via [employer] Paging/Directory    ______________________________________________________________________    Interval History   Blood pressure soft this morning.  Denies nausea, vomiting or abdominal pain.  Now has erythema of both lower extremity with left worse than the right.  Endorses minimal pain on the left side.  Afebrile.    Data reviewed today: I reviewed all medications, new labs and imaging results over the last 24 hours. I personally reviewed no images or EKG's today.    Physical Exam   Vital signs:  Temp: 98.2  F (36.8  C) Temp src: Axillary BP: (!) 78/40 Pulse: 85   Resp: 20 SpO2: 95 % O2 Device: None (Room air) Oxygen Delivery: 2 LPM Height: 167.6 cm (5' 6\") Weight: 119.7 kg (264 lb)  Estimated body mass index is 42.61 kg/m  as calculated from the following:    Height as of this encounter: 1.676 m (5' 6\").    Weight as of this encounter: 119.7 kg (264 lb).      Wt Readings from Last " 2 Encounters:   10/25/22 119.7 kg (264 lb)   07/13/22 (!) 175.9 kg (387 lb 12.8 oz)       Gen: AAOX2-3, NAD, somewhat forgetful and confused  Resp: CTA B/L, normal WOB  CVS: RRR, no murmur  Abd/GI: Soft, nontender, bowel sound normoactive.  No guarding or rebound.  Skin: Erythema involving bilateral legs, more intense on the left than the right.  Mild tender to touch, warmth+, also has erythema involving left lateral thigh which is less intense compared to yesterday.  MSK: Trace-1+ pedal edema  Neuro- CN- intact. No focal deficits.      Data   Recent Labs   Lab 10/31/22  0853 10/29/22  0915 10/27/22  0900 10/26/22  0943 10/25/22  0945   WBC 14.7*  --   --   --   --    HGB 14.9  --   --   --   --    MCV 86  --   --   --   --      --  278 262 295   NA  --  137  --   --   --    POTASSIUM  --  4.1  --  3.5 3.4   CHLORIDE  --  106  --   --   --    CO2  --  23  --   --   --    BUN  --  14  --   --   --    CR  --  0.71  --   --   --    ANIONGAP  --  8  --   --   --    ASHIA  --  8.6  --   --   --    GLC  --  91  --   --   --        No results found for this or any previous visit (from the past 24 hour(s)).  Medications     - MEDICATION INSTRUCTIONS -       sodium chloride 250 mL/hr at 10/31/22 0856       ARIPiprazole  10 mg Oral QPM     aspirin  325 mg Oral Daily     atorvastatin  40 mg Oral QPM     ceFAZolin  2 g Intravenous Q8H     enoxaparin ANTICOAGULANT  40 mg Subcutaneous Q12H     [Held by provider] furosemide  20 mg Oral BID     miconazole   Topical BID     multivitamin w/minerals  1 tablet Oral Daily     OLANZapine  2.5 mg Oral At Bedtime     polyethylene glycol  17 g Oral Daily     [Held by provider] potassium chloride  20 mEq Oral BID     senna-docusate  1-2 tablet Oral BID     sertraline  50 mg Oral Daily     sodium chloride (PF)  3 mL Intracatheter Q8H     [Held by provider] spironolactone  12.5 mg Oral Daily

## 2022-10-31 NOTE — PROGRESS NOTES
Critical Care Progress Note      10/31/2022    Name: Cristy Bailey MRN#: [id number]   Age: 77 year old YOB: 1945     [facility]  [facility] #    MV DAY #             Problem List:   Active Problems:    CVA (cerebral vascular accident) (H)    1. Septic shock - etiology unclear though suspect LLE cellulitis; cultures pending. Will obtain PICC for Levophed and will titrate off as able. X-rays to evaluate left leg and pelvis. She has received 1.5 liters of crystalloid and will give 1 more (she is on room air). I will discuss with ID when available   2. Right MCA stroke and s/p thrombectomy- she is weak on left but still has a remarkable function - will continue PT and OT when improved  3. History of CHF due to diastolic dysfunction - monitor only   4. Anxiety with occasional situational confusion   5. Wounds chronic and healthy appearing  6. Malnutrition - PO's   7. History of vaginal bleeding   8. Likely sleep apnea - BIPAP prn  9. Meningioma left parietal region  10. Morbid obesity   11. HTN  12. Dyslipidemia   13. Severe debilitation              Summary/Hospital Course:           Assessment and plan :     Cristy Bailey IS a 77 year old female admitted on 7/13/2022 for septic shock.   I have personally reviewed the daily labs, imaging studies, cultures and discussed the case with referring physician and consulting physicians.     My assessment and plan by system for this patient is as follows:    Neurology/Psychiatry:   1. Confused at times and otherewised    Cardiovascular:   1.Hemodynamics - on Levophed    Pulmonary/Ventilator Management:   1. On room air     GI and Nutrition :   1. PO's only at present     Renal/Fluids/Electrolytes:   1. Increasing creatinine due to ATN from hypotension and IV Contrast     Infectious Disease:   1. Septic shock, likely LLE celluliti    Endocrine:   1.monitor glucoss     Hematology/Oncology:   1. Compensated      IV/Access:   1. Venous access -   2. Arterial  access -   3.  Plan  - central access required and necessary      ICU Prophylaxis:   1. DVT: Hep Subq/ LMWH/mechanical  2. VAP: HOB 30 degrees, chlorhexidine rinse  3. Stress Ulcer: PPI/H2 blocker  4. Restraints: Nonviolent soft two point restraints required and necessary for patient safety and continued cares and good effect as patient continues to pull at necessary lines, tubes despite education and distraction. Will readdress daily.   5. IV Access - central access required and necessary for continued patient cares  6. Feeding - PO's  7. Family Update: deferred as patient seems co  8. Disposition - ICU care        Key goals for next 24 hours:   1.  Evaluate wound with XRT  2.  Antibiotics and fluids   3.               Interim History:              Key Medications:       ARIPiprazole  10 mg Oral QPM     aspirin  325 mg Oral Daily     atorvastatin  40 mg Oral QPM     enoxaparin ANTICOAGULANT  40 mg Subcutaneous Q12H     [Held by provider] furosemide  20 mg Oral BID     lactated ringers  1,000 mL Intravenous Once     miconazole   Topical BID     multivitamin w/minerals  1 tablet Oral Daily     OLANZapine  2.5 mg Oral At Bedtime     piperacillin-tazobactam  2.25 g Intravenous Q6H     polyethylene glycol  17 g Oral Daily     [Held by provider] potassium chloride  20 mEq Oral BID     senna-docusate  1-2 tablet Oral BID     sertraline  50 mg Oral Daily     sodium chloride (PF)  3 mL Intracatheter Q8H     sodium chloride (PF)  3 mL Intracatheter Q8H     [Held by provider] spironolactone  12.5 mg Oral Daily     vancomycin place irizarry - receiving intermittent dosing  1 each Intravenous See Admin Instructions       - MEDICATION INSTRUCTIONS -       norepinephrine 0.05 mcg/kg/min (10/31/22 1416)     sodium chloride 125 mL/hr at 10/31/22 1420               Physical Examination:   Temp:  [97.3  F (36.3  C)-98.2  F (36.8  C)] 97.3  F (36.3  C)  Pulse:  [76-85] 80  Resp:  [18-20] 20  BP: ()/(40-73) 99/68  SpO2:  [94 %-100  %] 99 %    Intake/Output Summary (Last 24 hours) at 10/31/2022 1544  Last data filed at 10/31/2022 0917  Gross per 24 hour   Intake 300 ml   Output 20 ml   Net 280 ml     Wt Readings from Last 4 Encounters:   10/25/22 119.7 kg (264 lb)   07/13/22 (!) 175.9 kg (387 lb 12.8 oz)   11/17/21 (!) 152.4 kg (336 lb)   03/01/19 137.2 kg (302 lb 6.4 oz)     BP - Mean:  [50-84] 75  Resp: 20    No lab results found in last 7 days.    GEN: no acute distress   HEENT: head ncat, sclera anicteric, OP patent, trachea midline   PULM: unlabored synchronous with vent, clear anteriorly    CV/COR: RRR S1S2 no gallop,  No rub, no murmur  ABD: soft nontender, hypoactive bowel sounds, no mass  EXT:  Edema   warm  NEURO: grossly intact  SKIN: no obvious rash  LINES: clean, dry intact         Data:   All data and imaging reviewed     ROUTINE ICU LABS (Last four results)  CMP  Recent Labs   Lab 10/31/22  1202 10/31/22  1137 10/29/22  0915 10/26/22  0943 10/25/22  0945     --  137  --   --    POTASSIUM 4.4  --  4.1 3.5 3.4   CHLORIDE 100  --  106  --   --    CO2 27  --  23  --   --    ANIONGAP 7  --  8  --   --    * 110* 91  --   --    BUN 38*  --  14  --   --    CR 2.08*  --  0.71  --   --    GFRESTIMATED 24*  --  88  --   --    ASHIA 8.6  --  8.6  --   --      CBC  Recent Labs   Lab 10/31/22  0853 10/27/22  0900 10/26/22  0943 10/25/22  0945   WBC 14.7*  --   --   --    RBC 5.09  --   --   --    HGB 14.9  --   --   --    HCT 44.0  --   --   --    MCV 86  --   --   --    MCH 29.3  --   --   --    MCHC 33.9  --   --   --    RDW 16.9*  --   --   --     278 262 295     INRNo lab results found in last 7 days.  Arterial Blood GasNo lab results found in last 7 days.    All cultures:  No results for input(s): CULT in the last 168 hours.  Recent Results (from the past 24 hour(s))   XR Chest Port 1 View    Narrative    CHEST ONE VIEW October 31, 2022 11:45 AM     HISTORY: New concern for sepsis.    COMPARISON: October 29, 2022.       Impression    IMPRESSION: No acute disease.    NAHOMY SILVERMAN MD         SYSTEM ID:  [id number]       MD Favio    Billing: This patient is critically ill: Yes. Total critical care time today 45 min.

## 2022-10-31 NOTE — PROGRESS NOTES
Patient continues to be hypotensive despite 500 cc normal saline bolus.  Found to have new cellulitis of bilateral lower extremities, left greater than right.  Also has significantly elevated CRP and procalcitonin.    Plan:  -Change antibiotics to vancomycin and Zosyn  -Blood culture x2  -Another normal saline 500 cc bolus  -Transfer to [facility]  -ID consult  -We will ask wound care to reevaluate her back wound  -Daughter Kaylynn updated on the phone    Addendum 12:53 PM  Patient continues to be hypotensive despite 2 L IV fluid bolus  Patient is DNR/DNI, her significant other Leo is the healthcare agent was not reachable on the phone right now.  I did call Kaylynn, patient's daughter who deferred the decision regarding further care to Leo  -On chart review the last discussion about goals of care was on September 28 and Leo had wanted all restorative care to be continued  -Until we hear back from Leo, we will continue with current care including pressors  -Transfer to ICU  -Check random cortisol level, empiric Solu-Cortef 50 mg IV x1

## 2022-11-01 NOTE — PROGRESS NOTES
[facility]    Infectious Disease Progress Note    Date of Service : 11/01/2022     Assessment:   77 YF with morbid obesity, HTN who has had prolonged hospitalization since 7/13 initially with CVA requiring thrombolytic therapy and thrombectomy with multiple complications of CHF and infected pressure wounds requiring debridement, who has now developed hypotension with sepsis syndrome and PRECIOUS. She has lower extremity erythema with soft tissue infection, cxs are pending.     -Bilateral LE erythema and cellulitis has resolved, some tenderness left ankle  -sepsis   -PRECIOUS improving  -Right flank/back wounds due to pressure injury with infection. S/p surgical debridement with polymicrobial cx-MSSA, proteus mirabilis and E.fecalis. Has jade treated  -Recent R MCA CVA s/p thrombolytic therapy and mechanical thrombectomy on 7/13 with resolution of neurological deficits  -LE lymphedema  -Decubitus ulcers  -Chronic medical conditions - Obesity, diastolic CHF, HTN, venous stasis with stasis dermatitis, bilateral hip replacement     Recommendations:  1. Follow cx data -blood/urine cx pending  2. Discontinue Vancomycin  3. Continue Zosyn for now  4. Further antibiotic modification once cxs are finalized  5. Check pancreatic enzymes in view of nausea/vomiting    Mere Demarco MD    Interval History   Has remained afebrile, leukocytosis and CRP are improving, renal functions also improving. Complains of bilateral LE pain though erythema seems to have resolved, nauseated this morning LFTs stable    Physical Exam   Temp: 98.6  F (37  C) Temp src: Axillary BP: (!) 77/53 Pulse: 82   Resp: 17 SpO2: 99 % O2 Device: None (Room air)    Vitals:    10/19/22 0645 10/25/22 0500 11/01/22 0600   Weight: 121.4 kg (267 lb 10.2 oz) 119.7 kg (264 lb) 136.3 kg (300 lb 8 oz)     Vital Signs with Ranges  Temp:  [97.3  F (36.3  C)-98.6  F (37  C)] 98.6  F (37  C)  Pulse:  [71-90] 82  Resp:  [9-22] 17  BP: ()/(41-82)  77/53  SpO2:  [91 %-100 %] 99 %    Constitutional: Awake, alert, cooperative, no apparent distress  Lungs: Clear to auscultation  Cardiovascular:S1 and S2,   Abdomen: morbidly obese, difficult to assess  Skin: No rash  MS : LE edema, resolved erythema    Other:    Medications     - MEDICATION INSTRUCTIONS -       norepinephrine 0.1 mcg/kg/min (11/01/22 1019)     sodium chloride 125 mL/hr at 11/01/22 0603       ARIPiprazole  10 mg Oral QPM     aspirin  325 mg Oral Daily     atorvastatin  40 mg Oral QPM     enoxaparin ANTICOAGULANT  40 mg Subcutaneous Q24H     lactated ringers  1,000 mL Intravenous Once     miconazole   Topical BID     multivitamin w/minerals  1 tablet Oral Daily     OLANZapine  2.5 mg Oral At Bedtime     piperacillin-tazobactam  3.375 g Intravenous Q6H     polyethylene glycol  17 g Oral Daily     [Held by provider] potassium chloride  20 mEq Oral BID     potassium chloride  20 mEq Intravenous Q1H     senna-docusate  1-2 tablet Oral BID     sertraline  50 mg Oral Daily     sodium chloride (PF)  10-40 mL Intracatheter Q7 Days     sodium chloride (PF)  3 mL Intracatheter Q8H     [Held by provider] spironolactone  12.5 mg Oral Daily     IV BOLUS builder WITH LARGE additive list   Intravenous Once     vancomycin place irizarry - receiving intermittent dosing  1 each Intravenous See Admin Instructions       Data   All microbiology laboratory data reviewed.  Recent Labs   Lab Test 11/01/22  0419 10/31/22  0853 10/27/22  0900 10/17/22  0857 10/16/22  0933   WBC 12.2* 14.7*  --   --  4.9   HGB 11.5* 14.9  --   --  15.0   HCT 33.5* 44.0  --   --  46.6   MCV 87 86  --   --  89    250 278   < > 270    < > = values in this interval not displayed.     Recent Labs   Lab Test 11/01/22  0419 10/31/22  1202 10/29/22  0915   CR 1.24* 2.08* 0.71     Recent Labs   Lab Test 08/31/18  1649   SED 38*      Microbiology  10/31 blood cx pending  10/31 urine cx pending

## 2022-11-01 NOTE — PROGRESS NOTES
"Critical Care Progress Note      11/01/2022    Name: Cristy Bailey MRN#: [id number]   Age: 77 year old YOB: 1945     [facility] Day# 111  ICU DAY #    MV DAY #             Problem List:   Active Problems:    CVA (cerebral vascular accident) (H)    1. Septic shock - etiology remains unclear and suspect LLE cellulitis vs. UTI; cultures pending. She remains on Levophed at 0.1 and seems \"dry\" and extra 1 liter of LR to be given.   2. Right MCA stroke and s/p thrombectomy- she is weak on left but still has a remarkable function - will continue PT and OT (ordered)  3. History of CHF due to diastolic dysfunction - monitor only at present   4. Anxiety with occasional situational confusion   5. Wounds chronic and healthy appearing  6. Malnutrition - PO's but intake still marginal  7. History of vaginal bleeding   8. Likely sleep apnea - BIPAP prn  9. Meningioma left parietal region  10. Morbid obesity   11. HTN  12. Dyslipidemia   13. Severe debilitation            Summary/Hospital Course:           Assessment and plan :     Cristy Bailey IS a 77 year old female admitted on 7/13/2022 for septic shock.   I have personally reviewed the daily labs, imaging studies, cultures and discussed the case with referring physician and consulting physicians.     My assessment and plan by system for this patient is as follows:    Neurology/Psychiatry:   1. She seems depressed but otherwise little change     Cardiovascular:   1.Hemodynamics - septic shock on Levophed and antibiotics     Pulmonary/Ventilator Management:   1. On room air     GI and Nutrition :   1.  PO's     Renal/Fluids/Electrolytes:   1. ATN- UO low but creatinine improving; monitor only; continue to liberalize fluids    Infectious Disease:   1. Compensated on Zosyn    Endocrine:   1. Glucoses ok     Hematology/Oncology:   1. Hb 11.5     IV/Access:   1. Venous access -   2. Arterial access -   3.  Plan  - central access required and necessary      ICU " Prophylaxis:   1. DVT: Hep Subq/ LMWH/mechanical  2. VAP: HOB 30 degrees, chlorhexidine rinse  3. Stress Ulcer: PPI/H2 blocker  4. Restraints: Nonviolent soft two point restraints required and necessary for patient safety and continued cares and good effect as patient continues to pull at necessary lines, tubes despite education and distraction. Will readdress daily.   5. IV Access - central access required and necessary for continued patient cares  6. Feeding - PO's   7. Family Update: deferred but RN spoke with significant   8. Disposition - ICU care        Key goals for next 24 hours:   1. Continue current care  2.  3.               Interim History:              Key Medications:       ARIPiprazole  10 mg Oral QPM     aspirin  325 mg Oral Daily     atorvastatin  40 mg Oral QPM     enoxaparin ANTICOAGULANT  40 mg Subcutaneous Q24H     lactated ringers  1,000 mL Intravenous Once     lactated ringers  1,000 mL Intravenous Once     miconazole   Topical BID     multivitamin w/minerals  1 tablet Oral Daily     OLANZapine  2.5 mg Oral At Bedtime     piperacillin-tazobactam  3.375 g Intravenous Q6H     polyethylene glycol  17 g Oral Daily     [Held by provider] potassium chloride  20 mEq Oral BID     senna-docusate  1-2 tablet Oral BID     sertraline  50 mg Oral Daily     sodium chloride (PF)  10-40 mL Intracatheter Q7 Days     sodium chloride (PF)  3 mL Intracatheter Q8H     [Held by provider] spironolactone  12.5 mg Oral Daily       - MEDICATION INSTRUCTIONS -       norepinephrine 0.1 mcg/kg/min (11/01/22 1201)     sodium chloride Stopped (11/01/22 1146)               Physical Examination:   Temp:  [98  F (36.7  C)-98.6  F (37  C)] 98.4  F (36.9  C)  Pulse:  [] 87  Resp:  [9-30] 18  BP: ()/(42-82) 81/70  SpO2:  [91 %-100 %] 96 %    Intake/Output Summary (Last 24 hours) at 11/1/2022 1248  Last data filed at 11/1/2022 1200  Gross per 24 hour   Intake 3419.08 ml   Output 425 ml   Net 2994.08 ml     Wt Readings  from Last 4 Encounters:   11/01/22 136.3 kg (300 lb 8 oz)   07/13/22 (!) 175.9 kg (387 lb 12.8 oz)   11/17/21 (!) 152.4 kg (336 lb)   03/01/19 137.2 kg (302 lb 6.4 oz)     BP - Mean:  [58-87] 74  Resp: 18    No lab results found in last 7 days.    GEN: no acute distress   HEENT: head ncat, sclera anicteric, OP patent, trachea midline   PULM: unlabored synchronous with vent, clear anteriorly    CV/COR: RRR S1S2 no gallop,  No rub, no murmur  ABD: soft nontender, obese   EXT:  Mild edema   warm  NEURO: grossly intact  SKIN: no obvious rash; LLE seems modestly improved;   LINES: clean, dry intact         Data:   All data and imaging reviewed     ROUTINE ICU LABS (Last four results)  CMP  Recent Labs   Lab 11/01/22  1228 11/01/22  0419 10/31/22  1202 10/31/22  1137 10/29/22  0915 10/26/22  0943   NA  --  142 134  --  137  --    POTASSIUM  --  3.2* 4.4  --  4.1 3.5   CHLORIDE  --  114* 100  --  106  --    CO2  --  18* 27  --  23  --    ANIONGAP  --  10 7  --  8  --    GLC 72 94 121* 110* 91  --    BUN  --  36* 38*  --  14  --    CR  --  1.24* 2.08*  --  0.71  --    GFRESTIMATED  --  45* 24*  --  88  --    ASHIA  --  6.1* 8.6  --  8.6  --    PROTTOTAL  --  3.6*  --   --   --   --    ALBUMIN  --  1.3*  --   --   --   --    BILITOTAL  --  0.5  --   --   --   --    ALKPHOS  --  102  --   --   --   --    AST  --  30  --   --   --   --    ALT  --  14  --   --   --   --      CBC  Recent Labs   Lab 11/01/22  0419 10/31/22  0853 10/27/22  0900 10/26/22  0943   WBC 12.2* 14.7*  --   --    RBC 3.87 5.09  --   --    HGB 11.5* 14.9  --   --    HCT 33.5* 44.0  --   --    MCV 87 86  --   --    MCH 29.7 29.3  --   --    MCHC 34.3 33.9  --   --    RDW 16.6* 16.9*  --   --     250 278 262     INRNo lab results found in last 7 days.  Arterial Blood GasNo lab results found in last 7 days.    All cultures:  No results for input(s): CULT in the last 168 hours.  Recent Results (from the past 24 hour(s))   XR Pelvis Port 1/2  Views    Narrative    EXAM: XR PELVIS PORT 1/2 VIEWS, XR FEMUR PORT LEFT 2 VIEWS  LOCATION: [facility]  DATE/TIME: 10/31/2022 7:01 PM    INDICATION: Pain, cellulitis  COMPARISON: 03/20/2019      Impression    IMPRESSION:   Pelvis: Visualized bilateral hip arthroplasty stable. No acute fracture. No evidence of osteomyelitis.     Femur: No fracture. No evidence of osteomyelitis mild degenerative arthritis left knee. Artifact from bandaging or garment over the left thigh.   XR Femur Port Left 2 Views    Narrative    EXAM: XR PELVIS PORT 1/2 VIEWS, XR FEMUR PORT LEFT 2 VIEWS  LOCATION: [facility]  DATE/TIME: 10/31/2022 7:01 PM    INDICATION: Pain, cellulitis  COMPARISON: 03/20/2019      Impression    IMPRESSION:   Pelvis: Visualized bilateral hip arthroplasty stable. No acute fracture. No evidence of osteomyelitis.     Femur: No fracture. No evidence of osteomyelitis mild degenerative arthritis left knee. Artifact from bandaging or garment over the left thigh.       MD Favio    Billing: This patient is critically ill: Yes. Total critical care time today 35 min.

## 2022-11-01 NOTE — PROGRESS NOTES
Care Management Follow Up    Length of Stay (days): 111    Expected Discharge Date: 11/09/2022     Concerns to be Addressed:       Patient plan of care discussed at interdisciplinary rounds: Yes    Anticipated Discharge Disposition: Skilled Nursing Facility     Anticipated Discharge Services:    Anticipated Discharge DME:      Patient/family educated on Medicare website which has current facility and service quality ratings:    Education Provided on the Discharge Plan:    Patient/Family in Agreement with the Plan: yes    Referrals Placed by CM/SW:    Private pay costs discussed:   Additional Information:  Prior to patient's transfer to ICU, many SNF referrals had been made. Either the facility did not have a vacancy or patient was declined.   Earlier in patient's stay, the focus was a TCU, however later it became apparent, based on therapy here, the focus needs to be a skilled long term care unit.  Despite weight being under 350 lbs. patient requires bariatric equipment due to carrying weight in her mid section/hip area. Several facilities declined due to bariatric need.  Writer suggests making new referrals at the appropriate time as previous referrals had been made over several months.    VADIM SandersonSW

## 2022-11-01 NOTE — PLAN OF CARE
Shift Summary  Assumed cares from 1445 (when patient arrived to unit) to 1900.    Neuro: Alert to self, year and knows she's in the hospital. Follows commands. Pt does have some forgetfulness. LUE slightly weaker than left at times.   Cardiac: NSR. Levo titrated to meet MAP >65.  Pulmonary: LSC/dim, on room air.   GI: Pt on regular diet, pt did not want to eat dinner today. Last BM 10/31/2022.  : Carrasquillo in place for strict I/O. Lower UOP. Melissa colored urine.  Integumentary: See flow-sheet. Pt turned and repositioned q2h.  Restraints: Not needed  Activity: Bedrest    Plan of care has been explained to patient: Yes.     Leana Joya RN

## 2022-11-01 NOTE — PROGRESS NOTES
"Shift Summary    Assumed cares @ 1900. Pt educated on call light and pt showed how to use call light, pt did not use call light throughout shift, just would yell out to the nurses station. Pt also states that she does not want the blood pressure cuff on, pt educated on the medication that pt is on and that for safety issues the blood pressure cuff must stay on. Pt states understandings but would like to talk to the provider in the AM.     Neuro: A&O to self, disoriented to place, time and situation. When pt is asked these questions pt states \"I don't know, and I don't want to answer\"  Cardiac: SR, on Levo @ 0.06 for MAP greater than 65. RN Attempted to titrate down lower, pts MAP's dropped below 65.   Pulmonary: LS clear/diminished on RM.  GI: Regular diet ordered. One BM during shift.  : Carrasquillo remains in place. Vaginal bleeding remains.  Integumentary: See flow-sheet. No new skin issues noted during shift.  Restraints: Not needed  Activity: Bedrest     Plan of care has been explained to patient: Yes.     Malissa Reddy, RN            "

## 2022-11-01 NOTE — PROGRESS NOTES
"SPIRITUAL HEALTH SERVICES Progress Note  [facility] ICU    Saw ptnt per duration of stay. I provided guided imagery, breathing exercises, and prayer. She spoke to me about a \"big animal in the corner of her room\" scaring her.     SHS remain available.     DILMA BeckDiv  Chaplain Resident   [phone]   "

## 2022-11-02 NOTE — PROGRESS NOTES
Critical Care Progress Note      11/02/2022    Name: Cristy Bailey MRN#: [id number]   Age: 77 year old YOB: 1945     [facility] Day# 112  ICU DAY #    MV DAY #             Problem List:   Active Problems:    CVA (cerebral vascular accident) (H)    1. Septic shock - etiology still remains unclear and suspect LLE cellulitis vs. UTI; cultures are also negative. She remains on Levophed at 0.09. CT of abdomen and pelvis and lower extremities pending    2. Right MCA stroke and s/p thrombectomy- she is weak on left but still has a remarkable function - will continue PT and OT (ordered)  3. History of CHF due to diastolic dysfunction - monitor only at present   4. Anxiety with occasional situational confusion   5. Wounds chronic and healthy appearing; per [facility] nurse  6. Malnutrition - PO's but intake still marginal  7. History of vaginal bleeding   8. Likely sleep apnea - BIPAP prn  9. Meningioma left parietal region  10. Morbid obesity   11. HTN  12. Dyslipidemia   13. Severe debilitation   14. Recent acute renal failure- now resolving; will avoid nephrotoxins and contrast at present.            Summary/Hospital Course:           Assessment and plan :     Cristy Bailey IS a 77 year old female admitted on 7/13/2022 for septic shock.   I have personally reviewed the daily labs, imaging studies, cultures and discussed the case with referring physician and consulting physicians.     My assessment and plan by system for this patient is as follows:    Neurology/Psychiatry:   1. Alert and comfortable; follows commands and moves all extremities      Cardiovascular:   1.Hemodynamics - she remains on Levophed     Pulmonary/Ventilator Management:   1. On room air    GI and Nutrition :   1. PO's but poor PO intake     Renal/Fluids/Electrolytes:   1. PRECIOUS resolving and creatinine down to 1.02.    Infectious Disease:   1. Septic shock; imaging studies pending and changes by ID noted     Endocrine:   1. Glucoses ok      Hematology/Oncology:   1. Hb ok 12.7     IV/Access:   1. Venous access -   2. Arterial access -   3.  Plan  - central access required and necessary      ICU Prophylaxis:   1. DVT: Hep Subq/ LMWH/mechanical  2. VAP: HOB 30 degrees, chlorhexidine rinse  3. Stress Ulcer: PPI/H2 blocker  4. Restraints: Nonviolent soft two point restraints required and necessary for patient safety and continued cares and good effect as patient continues to pull at necessary lines, tubes despite education and distraction. Will readdress daily.   5. IV Access - central access required and necessary for continued patient cares  6. Feeding - PO's   7. Family Update: discussed with patient   8. Disposition - ICU care         Key goals for next 24 hours:   1. Imaging studies to evaluate for other potential sites of infection   2. ID/antibiotic changes today   3.               Interim History:              Key Medications:       ARIPiprazole  10 mg Oral QPM     aspirin  325 mg Oral Daily     atorvastatin  40 mg Oral QPM     cefTRIAXone  2 g Intravenous Q24H     clindamycin  900 mg Intravenous Q8H     enoxaparin ANTICOAGULANT  40 mg Subcutaneous Q24H     lactated ringers  1,000 mL Intravenous Once     miconazole   Topical BID     multivitamin w/minerals  1 tablet Oral Daily     [Held by provider] OLANZapine  2.5 mg Oral At Bedtime     polyethylene glycol  17 g Oral Daily     [Held by provider] potassium chloride  20 mEq Oral BID     senna-docusate  1-2 tablet Oral BID     sertraline  50 mg Oral Daily     sodium chloride (PF)  10-40 mL Intracatheter Q7 Days     sodium chloride (PF)  3 mL Intracatheter Q8H     [Held by provider] spironolactone  12.5 mg Oral Daily       dextrose 5% lactated ringers 100 mL/hr at 11/02/22 1239     - MEDICATION INSTRUCTIONS -       norepinephrine 0.09 mcg/kg/min (11/02/22 1346)               Physical Examination:   Temp:  [97.8  F (36.6  C)-98.6  F (37  C)] 97.8  F (36.6  C)  Pulse:  [] 88  Resp:  [9-25]  16  BP: ()/(41-97) 84/63  SpO2:  [89 %-98 %] 93 %    Intake/Output Summary (Last 24 hours) at 11/2/2022 1731  Last data filed at 11/2/2022 1400  Gross per 24 hour   Intake 4212.47 ml   Output 935 ml   Net 3277.47 ml     Wt Readings from Last 4 Encounters:   11/02/22 136 kg (299 lb 13.2 oz)   07/13/22 (!) 175.9 kg (387 lb 12.8 oz)   11/17/21 (!) 152.4 kg (336 lb)   03/01/19 137.2 kg (302 lb 6.4 oz)     BP - Mean:  [] 73  Resp: 16    No lab results found in last 7 days.    GEN: no acute distress; alert   HEENT: head ncat, sclera anicteric, OP patent, trachea midline   PULM: lungs clear on exam  CV/COR: RRR S1S2 no gallop,  No rub, no murmur  ABD: soft nontender, obese   EXT:  Mild edema   warm  NEURO: grossly intact; left extremities slightly weak but moving well   SKIN: no obvious rash; no cyanosis or mottling   LINES: clean, dry intact         Data:   All data and imaging reviewed     ROUTINE ICU LABS (Last four results)  CMP  Recent Labs   Lab 11/02/22  1520 11/02/22  0416 11/02/22  0415 11/02/22  0146 11/01/22 2016 11/01/22  1639 11/01/22  1634 11/01/22  1228 11/01/22  0419 10/31/22  1202 10/31/22  1137 10/29/22  0915   NA  --  140  --   --   --   --   --   --  142 134  --  137   POTASSIUM  --  3.6  --   --   --   --  3.8  --  3.2* 4.4  --  4.1   CHLORIDE  --  108  --   --   --   --   --   --  114* 100  --  106   CO2  --  20  --   --   --   --   --   --  18* 27  --  23   ANIONGAP  --  12  --   --   --   --   --   --  10 7  --  8   GLC  --  254* 270* 129* 108*   < >  --    < > 94 121*   < > 91   BUN  --  28  --   --   --   --   --   --  36* 38*  --  14   CR  --  1.02  --   --   --   --   --   --  1.24* 2.08*  --  0.71   GFRESTIMATED  --  57*  --   --   --   --   --   --  45* 24*  --  88   ASHIA  --  8.2*  --   --   --   --   --   --  6.1* 8.6  --  8.6   MAG  --  2.7*  --   --   --   --  1.4*  --   --   --   --   --    PHOS 5.9* 1.8*  --   --   --   --   --   --   --   --   --   --    PROTTOTAL  --   --    --   --   --   --   --   --  3.6*  --   --   --    ALBUMIN  --  1.6*  --   --   --   --   --   --  1.3*  --   --   --    BILITOTAL  --   --   --   --   --   --   --   --  0.5  --   --   --    ALKPHOS  --   --   --   --   --   --   --   --  102  --   --   --    AST  --   --   --   --   --   --   --   --  30  --   --   --    ALT  --   --   --   --   --   --   --   --  14  --   --   --     < > = values in this interval not displayed.     CBC  Recent Labs   Lab 11/02/22 0416 11/01/22  0419 10/31/22  0853 10/27/22  0900   WBC 12.0* 12.2* 14.7*  --    RBC 4.37 3.87 5.09  --    HGB 12.7 11.5* 14.9  --    HCT 37.0 33.5* 44.0  --    MCV 85 87 86  --    MCH 29.1 29.7 29.3  --    MCHC 34.3 34.3 33.9  --    RDW 17.3* 16.6* 16.9*  --     197 250 278     INRNo lab results found in last 7 days.  Arterial Blood GasNo lab results found in last 7 days.    All cultures:  No results for input(s): CULT in the last 168 hours.  Recent Results (from the past 24 hour(s))   XR Abdomen Port 1 View    Narrative    EXAM: XR ABDOMEN PORT 1 VIEW  LOCATION: [facility]  DATE/TIME: 11/1/2022 9:08 PM    INDICATION: Emesis.  COMPARISON: CT abdomen pelvis 11/16/2021.      Impression    IMPRESSION: Nonobstructive bowel gas pattern. No pneumatosis. Supine view limits evaluation for pneumoperitoneum. Bilateral hip arthroplasty hardware.       MD Favio    Billing: This patient is critically ill: Yes. Total critical care time today 35 min.

## 2022-11-02 NOTE — PROGRESS NOTES
Patient c/o of nausea, PRN zofran given with the relief.after nausea has resolved patient has been given scheduled abilify and lipitor , patient vomited clear secretions and medications. MD okeefe, Abd XR ordered, PO meds changed to IV.

## 2022-11-02 NOTE — PROGRESS NOTES
Discussed w/ pharmacist regarding conversion rates for po/iv zyprexa. Ok to try 1.25 w/ multiple times as needed while on tele. Pt w/ ongoing nausea that is not new.

## 2022-11-02 NOTE — PLAN OF CARE
Patient is restless at times, c/o of arm pain from BP cuff.Patient frequently screaming for help, takes BP cuff off, O2 probe off.Patient reports generalized pain with any reposition, denies pain at rest.Incontinent BMx3.Low UOP.BP controled on Levo drip.Magnisium replaced per protocol.

## 2022-11-02 NOTE — PROGRESS NOTES
[facility] Nurse Inpatient Assessment     Consulted for: Right lower back wound (Stage 3 HAPI)     Areas Assessed:      Areas visualized during today's visit: right skin fold, left medial thigh      Wound location: Right lower back in waist crease    Photo date: 11-2-22        10-27-22        10-19-22      Wound due to: Hospital Acquired Pressure injury stage 3   Stage: Unstageable 7/28, Following surgical debridement 7/30 Stage 3 Pressure Injury and Moisture Associated Skin Damage (MASD), suspect intertriginous pressure, appears to be deeper tissue damage within a crease, possible shear component from lift.    Wound history/plan of care: previously a large deep wound, vac therapy was used for a few weeks and wound filled in well. Previous hypertrophic tissue has receded.  Will switch to Melina today 11/2.   Wound base: pale pink moist tissue     Palpation of the wound bed: normal       Drainage: moderate      Description of drainage: serosanguinous       Measurements (length x width x depth, in cm) 1.2 x 8.5 x 0.2cm with small skin bridge in middle     Tunneling N/A      Undermining NA  Periwound skin:  Previous rash improved      Color: pink blanchable erythema      Temperature: normal to warm and sweaty  Odor: none    Pain: tender   Pain intervention: slow and gentle cares  Treatment goal: Heal   STATUS: improving slowly  Supplies ordered: at bedside     Treatment Plan:     Right posterior waist crease: Daily  1. Cleanse with wound cleanser or Vashe ( #[id number]).  2. Apply 3M No Sting barrier to periwound skin, let dry.  3. Apply Melina (# [id number]) to wound bed (cut pieces to fit).  (This is a silver collagen dressing that may partially dissolve into wound).   4. Cover with Mepilex, conforming carefully to skin contours.        Orders: Reviewed and Updated    RECOMMEND PRIMARY TEAM ORDER: None, at this time  Education provided: importance of repositioning, plan of care, wound  progress, Moisture management and Off-loading pressure  Discussed plan of care with: Patient and Nurse  WOC nurse follow-up plan: weekly  Notify WOC if wound(s) deteriorate.  Nursing to notify the Provider(s) and re-consult the WOC Nurse if new skin concern.    DATA:     Current support surface: Bariatric Low air loss mattress    Containment of urine/stool: Incontinence Protocol and Incontinent pad in bed  BMI: Body mass index is 48.42 kg/m .   Active diet order: Orders Placed This Encounter      Advance Diet as Tolerated      Regular Diet Adult Thin Liquids (level 0) (Upright position, alert, and assist as needed)     Output: I/O last 3 completed shifts:  In: 4455.26 [P.O.:300; I.V.:3405.26; IV Piggyback:750]  Out: 700 [Urine:675; Emesis/NG output:25]     Labs:   Recent Labs   Lab 11/02/22  0416 11/01/22  0419   ALBUMIN 1.6* 1.3*   HGB 12.7 11.5*   WBC 12.0* 12.2*   CRP  --  241.0*     Pressure injury risk assessment:   Sensory Perception: 3-->slightly limited  Moisture: 2-->very moist  Activity: 1-->bedfast  Mobility: 2-->very limited  Nutrition: 1-->very poor  Friction and Shear: 2-->potential problem  Demetrio Score: 11    Talia Linda RN CWOCN   Dept. Pager: [phone]  Dept. Office Number: [phone]

## 2022-11-02 NOTE — PROGRESS NOTES
Notified provider about indwelling snyder catheter discussed removal or continued need.    Did provider choose to remove indwelling snyder catheter? No    Provider's snyder indication for keeping indwelling snyder catheter: Strict 1-2 Hour I & O if external catheters are not an option.    Is there an order for indwelling snyder catheter? Yes    *If there is a plan to keep snyder catheter in place at discharge daily notification with provider is not necessary.

## 2022-11-02 NOTE — PLAN OF CARE
OT: New orders received. Chart reviewed and discussed with care team.  OT not indicated due to limited progress w/ therapy, seeking LTC placement.  Defer to nursing staff for behzad transfers to chair, assist w/ ADLs as needed.  Will complete orders.

## 2022-11-02 NOTE — PLAN OF CARE
Remains on levophed. Pt repeatedly removing BP cuff. Pt is forgetful, STM loss, calls out frequently. Pt does not want to be moved or repositioned. Resistant to cares. Nausea w/ emesis x1. Incontinent BM x1. RUBÉN, fluid bolus, slight improvement. Wound care completed. K+ replaced, WNL. Pt's daughter and s/o updated extensively on POC via phone.     Odette Vivas RN

## 2022-11-02 NOTE — PLAN OF CARE
Physical Therapy: Orders received. Chart reviewed and discussed with care team.? Physical Therapy not indicated due to previous long term trial (90 days) of physical therapy without ability to progress mobility. Pt requires use of lift equipment for all transfers and needs are nursing based. Please refer to physical therapy care plan note from 10/20/22. Deferring further physical therapy in the acute setting.? Defer discharge recommendations to medical team. Will complete PT order.

## 2022-11-02 NOTE — PROGRESS NOTES
[facility]    Infectious Disease Progress Note    Date of Service : 11/02/2022     Assessment:   77 YF with morbid obesity, HTN who has had prolonged hospitalization since 7/13 initially with CVA requiring thrombolytic therapy and thrombectomy with multiple complications of CHF and infected pressure wounds requiring debridement, who has now developed hypotension with sepsis syndrome and PRECIOUS. She had lower extremity erythema with soft tissue infection which seems to be improving, blood cxs are negative     -Bilateral LE erythema and cellulitis has resolved, some tenderness left ankle  -sepsis   -PRECIOUS improving  -Right flank/back wounds due to pressure injury with infection. S/p surgical debridement with polymicrobial cx-MSSA, proteus mirabilis and E.fecalis. Has jade treated  -Recent R MCA CVA s/p thrombolytic therapy and mechanical thrombectomy on 7/13 with resolution of neurological deficits  -LE lymphedema  -Decubitus ulcers  -Chronic medical conditions - Obesity, diastolic CHF, HTN, venous stasis with stasis dermatitis, bilateral hip replacement     Recommendations:  1. Blood cxs remain negative  2. Discontinue Zosyn, treat with Ceftriaxone, Clindamycin for LE cellulitis  3. Complains of bilateral pain in both legs, but no obvious lesions, blistering or significant tenderness, X ray without soft tissue gas , and symptoms are bilateral.   3. Could consider imaging with CT of both legs, but with bilateral symptoms infection would seem less likely  Continue to monitor for now    Discussed with the ICU team    Mere Demarco MD    Interval History   Remained afebrile, leukocytosis is improving, she is tolerating antibiotics without side effects, remains on pressors,     Physical Exam   Temp: 98.3  F (36.8  C) Temp src: Axillary BP: 100/65 Pulse: 101   Resp: 17 SpO2: 90 %      Vitals:    10/25/22 0500 11/01/22 0600 11/02/22 0400   Weight: 119.7 kg (264 lb) 136.3 kg (300 lb 8 oz) 136 kg (299 lb  13.2 oz)     Vital Signs with Ranges  Temp:  [98  F (36.7  C)-98.4  F (36.9  C)] 98.3  F (36.8  C)  Pulse:  [] 101  Resp:  [9-26] 17  BP: ()/(47-97) 100/65  SpO2:  [89 %-100 %] 90 %    Constitutional: Awake, alert, cooperative, no apparent distress  Lungs: Clear to auscultation bilaterally, no crackles or wheezing  Cardiovascular: Regular rate and rhythm, normal S1 and S2, and no murmur noted  Abdomen: Normal bowel sounds, soft, non-distended, non-tender  MS : Resolving erythema left leg some erythema posterior calf    Other:    Medications     dextrose 5% lactated ringers 100 mL/hr at 11/02/22 0400     - MEDICATION INSTRUCTIONS -       norepinephrine 0.1 mcg/kg/min (11/02/22 0400)       ARIPiprazole  10 mg Oral QPM     aspirin  325 mg Oral Daily     atorvastatin  40 mg Oral QPM     enoxaparin ANTICOAGULANT  40 mg Subcutaneous Q24H     lactated ringers  1,000 mL Intravenous Once     miconazole   Topical BID     multivitamin w/minerals  1 tablet Oral Daily     [Held by provider] OLANZapine  2.5 mg Oral At Bedtime     piperacillin-tazobactam  3.375 g Intravenous Q6H     polyethylene glycol  17 g Oral Daily     [Held by provider] potassium chloride  20 mEq Oral BID     senna-docusate  1-2 tablet Oral BID     sertraline  50 mg Oral Daily     sodium chloride (PF)  10-40 mL Intracatheter Q7 Days     sodium chloride (PF)  3 mL Intracatheter Q8H     [Held by provider] spironolactone  12.5 mg Oral Daily       Data   All microbiology laboratory data reviewed.  Recent Labs   Lab Test 11/02/22  0416 11/01/22  0419 10/31/22  0853   WBC 12.0* 12.2* 14.7*   HGB 12.7 11.5* 14.9   HCT 37.0 33.5* 44.0   MCV 85 87 86    197 250     Recent Labs   Lab Test 11/02/22  0416 11/01/22  0419 10/31/22  1202   CR 1.02 1.24* 2.08*     Recent Labs   Lab Test 08/31/18  1649   SED 38*

## 2022-11-03 NOTE — PROGRESS NOTES
[facility]    Infectious Disease Progress Note    Date of Service: 11/03/2022       Assessment:   77 YF with morbid obesity, HTN who has had prolonged hospitalization since 7/13 initially with CVA requiring thrombolytic therapy and thrombectomy with multiple complications of CHF and infected pressure wounds requiring debridement, who is now in ICU due to hypotension with sepsis syndrome and PRECIOUS. She had lower extremity erythema with soft tissue infection which seems to be improving, blood cxs have remained negative     -Bilateral LE erythema and cellulitis has resolved  -PRECIOUS resolved  -Hypotension persistent  -Right flank/back wounds due to pressure injury with infection. S/p surgical debridement with polymicrobial cx-MSSA, proteus mirabilis and E.fecalis. Has jade treated  -Recent R MCA CVA s/p thrombolytic therapy and mechanical thrombectomy on 7/13 with resolution of neurological deficits  -LE lymphedema  -Decubitus ulcers  -Chronic medical conditions - Obesity, diastolic CHF, HTN, venous stasis with stasis dermatitis, bilateral hip replacement     Recommendations:  1. Blood cxs have remained negative  2. Treat cellulitis with Ceftriaxone, Clindamycin, likely de escalate therapy by tomorrow  3. Consider evaluation for adrenal insufficieny given ongoing hypotension    Mere Demarco MD    Interval History   Remains in ICU, remains hypotensive, leukocytosis has improved, LE cellulitis has nearly resolved and she remains afebrile. Notes improved pain in legs and also reports bilateral leg pain has been chronic    Physical Exam   Temp: 98.3  F (36.8  C) Temp src: Axillary BP: 106/66 Pulse: 97   Resp: 16 SpO2: 96 %      Vitals:    11/01/22 0600 11/02/22 0400 11/03/22 0430   Weight: 136.3 kg (300 lb 8 oz) 136 kg (299 lb 13.2 oz) 138 kg (304 lb 3.8 oz)     Vital Signs with Ranges  Temp:  [97.8  F (36.6  C)-98.3  F (36.8  C)] 98.3  F (36.8  C)  Pulse:  [] 97  Resp:  [10-26] 16  BP:  ()/() 106/66  SpO2:  [89 %-97 %] 96 %    Constitutional: Awake, alert, cooperative, no apparent distress  Lungs: Clear to auscultation bilaterally, no crackles or wheezing  Cardiovascular: S1S2  Abdomen: obese  Skin: Mild erythema LLE    Other:    Medications     dextrose 5% lactated ringers 10 mL/hr at 11/03/22 0400     - MEDICATION INSTRUCTIONS -       norepinephrine 0.12 mcg/kg/min (11/03/22 0946)       ARIPiprazole  10 mg Oral QPM     aspirin  325 mg Oral Daily     atorvastatin  40 mg Oral QPM     cefTRIAXone  2 g Intravenous Q24H     clindamycin  900 mg Intravenous Q8H     enoxaparin ANTICOAGULANT  40 mg Subcutaneous Q24H     lactated ringers  1,000 mL Intravenous Once     miconazole   Topical BID     multivitamin w/minerals  1 tablet Oral Daily     [Held by provider] OLANZapine  2.5 mg Oral At Bedtime     polyethylene glycol  17 g Oral Daily     [Held by provider] potassium chloride  20 mEq Oral BID     potassium chloride  40 mEq Oral or Feeding Tube Once     potassium chloride  10 mEq Intravenous Q1H     senna-docusate  1-2 tablet Oral BID     sertraline  50 mg Oral Daily     sodium chloride (PF)  10-40 mL Intracatheter Q7 Days     sodium chloride (PF)  3 mL Intracatheter Q8H     [Held by provider] spironolactone  12.5 mg Oral Daily       Data   All microbiology laboratory data reviewed.  Recent Labs   Lab Test 11/03/22 0429 11/02/22 0416 11/01/22 0419   WBC 8.3 12.0* 12.2*   HGB 13.2 12.7 11.5*   HCT 39.6 37.0 33.5*   MCV 86 85 87    216 197     Recent Labs   Lab Test 11/03/22 0429 11/02/22 0416 11/01/22 0419   CR 0.87 1.02 1.24*     Recent Labs   Lab Test 08/31/18  1649   SED 38*

## 2022-11-03 NOTE — PROGRESS NOTES
"Care Management Follow Up    Length of Stay (days): 113    Expected Discharge Date: 11/09/2022     Concerns to be Addressed:       Patient plan of care discussed at interdisciplinary rounds: Yes    Anticipated Discharge Disposition: Skilled Nursing Facility     Anticipated Discharge Services:    Anticipated Discharge DME:      Patient/family educated on Medicare website which has current facility and service quality ratings:    Education Provided on the Discharge Plan:    Patient/Family in Agreement with the Plan: yes    Referrals Placed by CM/SW:    Private pay costs discussed: Not applicable    Additional Information:  Asked by ICU MD to set up care conference with family (ideally for 11/4)    Of note Pt has ACP docs on file that list Leo Akbar as HCA, Luke Perry son as 1st HCA, Kaylynn Bailey Dtr as 2nd HCA.    Pt had palliative care consult on 9/28.    CC called Leo, no answer, unable to leave message x3  Per notes CC/SW had been talking with daughter Kaylynn  CC called Kaylynn and spoke with her.  She noted that Leo only has a land line (number confirmed), no answering machine, and no caller ID.  Leo had been visiting daily when on general floor.   Kaylynn has not spoken with Leo for several weeks.   CC addressed issue of having care conference to discuss GOC/plan going forward.  Kaylynn would like to be here in person. Declined video/phone visit.  She could not get here till Monday at the earliest.  She lives near [facility].  She would like Leo present as well as brother Armando (currently traveling in [place]) and possibly another estranged sister from [place].    CC is unable to arrange anything until we get in touch with Leo.  Patient updated and agreeable to care conference.  Wants daughter and Leo present.  Pt is tearful, distressed that Leo has not come to visit for \"3 days\"   Bedside RN updated.   Will continue to try to contact Leo and coordinate with Kaylynn for care conference, " possibly Monday.       Shanna Louis RN

## 2022-11-03 NOTE — PLAN OF CARE
Neuro: Intact.  Cardio: Levo 0.12 SD with 1st degree AVB  Resp: Dim LS room air  GI: BM x2 incontinent. Poor appetite  : Carrasquillo patent  Pain: Generalized. With movement  Activity: Sat in chair for 2 hours  Skin: Wound care complete

## 2022-11-03 NOTE — PROGRESS NOTES
Critical Care Progress Note      11/03/2022    Name: Cristy Bailey MRN#: [id number]   Age: 77 year old YOB: 1945     [facility]  [facility] #    MV DAY #             Problem List:   Active Problems:    CVA (cerebral vascular accident) (H)       1. Septic shock - etiology still remains unclear and suspect LLE cellulitis vs. UTI; cultures are also negative. She remains on Levophed at 0.09. CT of abdomen and pelvis and lower extremities pending. I appreciate Dr. Demarco's input and with concern of adrenal insufficiency we will add stress dose solucortef. Her procalcitonin is much less which is encouraging.   2. Right MCA stroke and s/p thrombectomy- she is weak on left but has remarkable function - will continue PT and OT   3. History of CHF due to diastolic dysfunction   4. Anxiety with occasional situational confusion   5. Wounds chronic and healthy appearing; per [facility] nurse  6. Malnutrition - PO's but intake still marginal  7. History of vaginal bleeding   8. Likely sleep apnea - BIPAP prn  9. Meningioma left parietal region  10. Morbid obesity   11. HTN  12. Dyslipidemia   13. Severe debilitation   14. Recent acute renal failure- now resolving; will avoid nephrotoxins and contrast at present.   Family meeting tomorrow to update status and discuss goals of care.          Summary/Hospital Course:           Assessment and plan :     Cristy Bailey IS a 77 year old female admitted on 7/13/2022 for septic shock.   I have personally reviewed the daily labs, imaging studies, cultures and discussed the case with referring physician and consulting physicians.     My assessment and plan by system for this patient is as follows:    Neurology/Psychiatry:   1. Awake and alert albeit confused    Cardiovascular:   1.Hemodynamics - remains on Levophed; little change    Pulmonary/Ventilator Management:   1. She has a minimal oxygen requirement     GI and Nutrition :   1. PO's     Renal/Fluids/Electrolytes:   1.  Normal renal function     Infectious Disease:   1. Septic shock; UTI vs. Cellulitis are still culprits.     Endocrine:   1. Glucoses ok     Hematology/Oncology:   1. Hb 13.2     IV/Access:   1. Venous access -   2. Arterial access -   3.  Plan  - central access required and necessary      ICU Prophylaxis:   1. DVT: Hep Subq/ LMWH/mechanical  2. VAP: HOB 30 degrees, chlorhexidine rinse  3. Stress Ulcer: PPI/H2 blocker  4. Restraints: Nonviolent soft two point restraints required and necessary for patient safety and continued cares and good effect as patient continues to pull at necessary lines, tubes despite education and distraction. Will readdress daily.   5. IV Access - central access required and necessary for continued patient cares  6. Feeding - PO's   7. Family Update: planned family meeting tomorrow   8. Disposition - ICU care         Key goals for next 24 hours:   1. solucortef added; no further changes   2.  3.               Interim History:              Key Medications:       ARIPiprazole  10 mg Oral QPM     aspirin  325 mg Oral Daily     atorvastatin  40 mg Oral QPM     cefTRIAXone  2 g Intravenous Q24H     clindamycin  900 mg Intravenous Q8H     enoxaparin ANTICOAGULANT  40 mg Subcutaneous Q12H     hydrocortisone sodium succinate PF  100 mg Intravenous Q6H     lactated ringers  1,000 mL Intravenous Once     miconazole   Topical BID     multivitamin w/minerals  1 tablet Oral Daily     [Held by provider] OLANZapine  2.5 mg Oral At Bedtime     polyethylene glycol  17 g Oral Daily     [Held by provider] potassium chloride  20 mEq Oral BID     potassium chloride  40 mEq Oral or Feeding Tube Once     senna-docusate  1-2 tablet Oral BID     sertraline  50 mg Oral Daily     sodium chloride (PF)  10-40 mL Intracatheter Q7 Days     sodium chloride (PF)  3 mL Intracatheter Q8H     [Held by provider] spironolactone  12.5 mg Oral Daily       dextrose 5% lactated ringers 10 mL/hr at 11/03/22 0400     - MEDICATION  INSTRUCTIONS -       norepinephrine 0.08 mcg/kg/min (11/03/22 1729)               Physical Examination:   Temp:  [98  F (36.7  C)-98.3  F (36.8  C)] 98.3  F (36.8  C)  Pulse:  [] 91  Resp:  [8-29] 16  BP: ()/() 94/68  SpO2:  [89 %-98 %] 92 %    Intake/Output Summary (Last 24 hours) at 11/3/2022 1807  Last data filed at 11/3/2022 1600  Gross per 24 hour   Intake 2548.16 ml   Output 665 ml   Net 1883.16 ml     Wt Readings from Last 4 Encounters:   11/03/22 138 kg (304 lb 3.8 oz)   07/13/22 (!) 175.9 kg (387 lb 12.8 oz)   11/17/21 (!) 152.4 kg (336 lb)   03/01/19 137.2 kg (302 lb 6.4 oz)     BP - Mean:  [] 85  Resp: 16    No lab results found in last 7 days.    GEN: no acute distress; comfortable   HEENT: head ncat, sclera anicteric, OP patent, trachea midline   PULM: unlabored synchronous with vent, clear anteriorly    CV/COR: RRR S1S2 no gallop,  No rub, no murmur  ABD: soft nontender, obese   EXT:  Mild edema   warm  NEURO: grossly intact  SKIN: no obvious rash; cellulitis improving   LINES: clean, dry intact         Data:   All data and imaging reviewed     ROUTINE ICU LABS (Last four results)  CMP  Recent Labs   Lab 11/03/22  1318 11/03/22  0429 11/03/22  0014 11/02/22  1941 11/02/22  1833 11/02/22  1520 11/02/22  0416 11/01/22  1639 11/01/22  1634 11/01/22  1228 11/01/22  0419 10/31/22  1202   NA  --  139  --   --   --   --  140  --   --   --  142 134   POTASSIUM 3.5 3.1*  --   --   --   --  3.6  --  3.8  --  3.2* 4.4   CHLORIDE  --  109  --   --   --   --  108  --   --   --  114* 100   CO2  --  22  --   --   --   --  20  --   --   --  18* 27   ANIONGAP  --  8  --   --   --   --  12  --   --   --  10 7   GLC  --  130*  119* 122* 133*   < >  --  254*   < >  --    < > 94 121*   BUN  --  19  --   --   --   --  28  --   --   --  36* 38*   CR  --  0.87  --   --   --   --  1.02  --   --   --  1.24* 2.08*   GFRESTIMATED  --  69  --   --   --   --  57*  --   --   --  45* 24*   ASHIA  --  7.9*  --    --   --   --  8.2*  --   --   --  6.1* 8.6   MAG  --  2.4*  --   --   --   --  2.7*  --  1.4*  --   --   --    PHOS  --  2.6  --   --   --  5.9* 1.8*  --   --   --   --   --    PROTTOTAL  --   --   --   --   --   --   --   --   --   --  3.6*  --    ALBUMIN  --  1.6*  --   --   --   --  1.6*  --   --   --  1.3*  --    BILITOTAL  --   --   --   --   --   --   --   --   --   --  0.5  --    ALKPHOS  --   --   --   --   --   --   --   --   --   --  102  --    AST  --   --   --   --   --   --   --   --   --   --  30  --    ALT  --   --   --   --   --   --   --   --   --   --  14  --     < > = values in this interval not displayed.     CBC  Recent Labs   Lab 11/03/22  0429 11/02/22  0416 11/01/22  0419 10/31/22  0853   WBC 8.3 12.0* 12.2* 14.7*   RBC 4.63 4.37 3.87 5.09   HGB 13.2 12.7 11.5* 14.9   HCT 39.6 37.0 33.5* 44.0   MCV 86 85 87 86   MCH 28.5 29.1 29.7 29.3   MCHC 33.3 34.3 34.3 33.9   RDW 17.7* 17.3* 16.6* 16.9*    216 197 250     INRNo lab results found in last 7 days.  Arterial Blood GasNo lab results found in last 7 days.    All cultures:  No results for input(s): CULT in the last 168 hours.  No results found for this or any previous visit (from the past 24 hour(s)).    MD Favio    Billing: This patient is critically ill: Yes. Total critical care time today 35 min.

## 2022-11-03 NOTE — PLAN OF CARE
Neuro: Anxious/confused. Purposefully moving all 4 extremities, weak.    Fever/Pain: Afebrile. C/o generalized pain with movement, BLE cellulitis improved.   CV: SR, Occasional PVCs. MAP>65 maintained w/ levo @0.05, weaning as able.   Pulm: Lung sounds clear/diminished in bases.  SpO2 stable on RA.    : Carrasquillo for strict I&Os with hemodynamic instability/insertion difficulty, low urine output, MDs aware. Potassium replaced x1, recheck due in AM.   GI: Regular diet, poor appetite.  Loose stools x1 this shift.    Skin: Blanchable redness on coccyx/buttocks. Moisture/contact rash on buttocks/joesph area/thighs, improving, barrier ointment applied. Wound cares performed.   Plan: Care conference on Mon. Family updated by phone of POC.           Goal Outcome Evaluation:      Plan of Care Reviewed With: patient    Overall Patient Progress: no changeOverall Patient Progress: no change

## 2022-11-04 NOTE — PROVIDER NOTIFICATION
MD Notification    Notified Person: MD    Notified Person Name: Isadora    Notification Date/Time: 11/3/2022 1005    Notification Interaction: Smart Web     Purpose of Notification:  . APOLINAR Roman. Pt is combative towards staff, refusing to put on BP cuff and leads. Zyprexa given at 2130. Can Seroquel be added? Thank you. JEEVAN Martin     Orders Received: Order received for ativan.     Comments: Contacted intensivist - not hospitalist patient at this time.

## 2022-11-04 NOTE — PROGRESS NOTES
Notified provider about indwelling snyder catheter discussed removal or continued need.    Did provider choose to remove indwelling snyder catheter? No    Provider's snyder indication for keeping indwelling snyder catheter: Insertion Difficulty .    Is there an order for indwelling snyder catheter? Yes    *If there is a plan to keep snyder catheter in place at discharge daily notification with provider is not necessary.

## 2022-11-04 NOTE — PLAN OF CARE
Shift: [id number]      Neuro: Pt is alert to self and place, flat affect; intermittently confused throughout the day, hallucinating at times; generally weak     CV: Heart rhythm is sinus, occasional PVC's; BP's soft during the shift, pt yells out when cuff is inflating, BP's taken as often as pt allowed-levo off since 0530    Resp: Lungs are clear/dim, pt on room air     GI: Smear BM's this shift, pt tolerating a regular diet, poor appetite     : Carrasquillo in place for strict I&O; responded well to lasix dose    Skin: See skin assessment in flowsheet     Mobility/Activity: Up to the chair with the lift     Pain: Pt denies pain     Family Updated: Pt's SO, Leo, at the bedside this AM

## 2022-11-04 NOTE — PLAN OF CARE
Neuro: Oriented to self, confused, demanding, combative with staff at times, refusing turns and taking off BP cuff and electrode patches.   CV: SR, MAP goal >65 - unable to check BP continuously throughout night - patient screams and is combative when BP cuff runs.   Respiratory: on RA, LS clear/diminished.   GI/: Carrasquillo with low UOP, loose BMx1   Skin: see flowsheet. Wound cares per order.   Activity: Turned, heels elevated   Diet: Regular diet  Plan: Care conference on Monday.

## 2022-11-04 NOTE — PROGRESS NOTES
[facility]    Infectious Disease Progress Note    Date of Service : 11/02/2022     Assessment:   77 YF with morbid obesity, HTN who has had prolonged hospitalization since 7/13 initially with CVA requiring thrombolytic therapy and thrombectomy with multiple complications of CHF and infected pressure wounds requiring debridement, who is now in ICU due to hypotension with sepsis syndrome and PRECIOUS. She had lower extremity erythema with soft tissue infection which seems to be improving, blood cxs have remained negative     -Bilateral LE erythema and cellulitis has resolved  -PRECIOUS resolved  -Hypotension persistent  -Right flank/back wounds due to pressure injury with infection. S/p surgical debridement with polymicrobial cx-MSSA, proteus mirabilis and E.fecalis. Has jade treated  -Recent R MCA CVA s/p thrombolytic therapy and mechanical thrombectomy on 7/13 with resolution of neurological deficits  -LE lymphedema  -Decubitus ulcers  -Chronic medical conditions - Obesity, diastolic CHF, HTN, venous stasis with stasis dermatitis, bilateral hip replacement     Recommendations  1. Discontinue Clindamycin  2. Ceftriaxone for another 5 days until 11/9  3. Cortisol level pending to assess for adrenal insufficiency given ongoing hypotension  Please call if questions over the weekend      Mere Demarco MD    Interval History   Remains afebrile, leukocytosis has resolved, tolerating antibiotics without side effects, markers for sepsis have all improved, source felt to be LLE cellulitis which is also improving    Physical Exam   Temp: 97.2  F (36.2  C) Temp src: Oral BP: (!) 85/61 Pulse: 85   Resp: 14 SpO2: 93 % O2 Device: None (Room air)    Vitals:    11/01/22 0600 11/02/22 0400 11/03/22 0430   Weight: 136.3 kg (300 lb 8 oz) 136 kg (299 lb 13.2 oz) 138 kg (304 lb 3.8 oz)     Vital Signs with Ranges  Temp:  [97.2  F (36.2  C)-98.3  F (36.8  C)] 97.2  F (36.2  C)  Pulse:  [] 85  Resp:  [8-29] 14  BP:  ()/() 85/61  SpO2:  [89 %-98 %] 93 %    Constitutional: Awake, alert, cooperative, no apparent distress  Lungs: Clear to auscultation bilaterally, no crackles or wheezing  Cardiovascular: S1S2  Abdomen: obese and difficult to assess  Skin: LLE cellulitis improving, some erythema posterior ankle, no tenderness     Other:    Medications     dextrose 5% lactated ringers 10 mL/hr at 11/03/22 0400     - MEDICATION INSTRUCTIONS -       norepinephrine 0.03 mcg/kg/min (11/03/22 2001)       ARIPiprazole  10 mg Oral QPM     aspirin  325 mg Oral Daily     atorvastatin  40 mg Oral QPM     cefTRIAXone  2 g Intravenous Q24H     clindamycin  900 mg Intravenous Q8H     enoxaparin ANTICOAGULANT  40 mg Subcutaneous Q12H     hydrocortisone sodium succinate PF  100 mg Intravenous Q6H     lactated ringers  1,000 mL Intravenous Once     miconazole   Topical BID     multivitamin w/minerals  1 tablet Oral Daily     [Held by provider] OLANZapine  2.5 mg Oral At Bedtime     polyethylene glycol  17 g Oral Daily     [Held by provider] potassium chloride  20 mEq Oral BID     potassium chloride  40 mEq Oral or Feeding Tube Once     senna-docusate  1-2 tablet Oral BID     sertraline  50 mg Oral Daily     sodium chloride (PF)  10-40 mL Intracatheter Q7 Days     sodium chloride (PF)  3 mL Intracatheter Q8H     [Held by provider] spironolactone  12.5 mg Oral Daily       Data   All microbiology laboratory data reviewed.  Recent Labs   Lab Test 11/04/22 0522 11/03/22 0429 11/02/22 0416   WBC 5.8 8.3 12.0*   HGB 12.1 13.2 12.7   HCT 36.4 39.6 37.0   MCV 86 86 85    263 216     Recent Labs   Lab Test 11/03/22 0429 11/02/22  0416 11/01/22 0419   CR 0.87 1.02 1.24*     Recent Labs   Lab Test 08/31/18  1649   SED 38*       Microbiology  10/31 blood and urine cx negative

## 2022-11-04 NOTE — PROGRESS NOTES
"Critical Care Progress Note      11/04/2022    Name: Cristy Bailey MRN#: [id number]   Age: 77 year old YOB: 1945     \[facility] [place] [facility]\""tl Day# 114  ICU DAY #    MV DAY #             Problem List:   Active Problems:    CVA (cerebral vascular accident) (H)    1. Septic shock - etiology is unclear and suspect LLE cellulitis vs. UTI; cultures remain negative. She is now off of Levophed. CT of abdomen and pelvis and lower extremities essentially negative. I appreciate Dr. Demarco's input and with concern of adrenal insufficiency we added stress dose solucortef and she seemed to quickly resolve her vasopressor need. Her procalcitonin is again less today (0.5) which is encouraging. Her BP's have been \"soft\" today but if stable tomorrow she can leave ICU.   2. Adrenal insufficiency based on clinical grounds this episode; I would recommend replacement of both solucortef and adrenocorticoid; florinef started.   3. Right MCA stroke and s/p thrombectomy- she remains mildly weak on left but has remarkable function - will continue PT and OT   4. History of CHF due to diastolic dysfunction - IV lasix started at q8 hours for 24 hours at present.   5. Anxiety with occasional situational confusion   6. Wounds chronic and healthy appearing; per [facility] nurse  7. Malnutrition - PO's but intake still marginal  8. History of vaginal bleeding   9. Likely sleep apnea - BIPAP prn  10. Meningioma left parietal region  11. Morbid obesity   12. HTN  13. Dyslipidemia   14. Severe debilitation   15. Recent acute renal failure- now resolving; will avoid nephrotoxins and contrast at present.    Care conference with significant other and family now scheduled for Monday. It is my impression that she is unlikely to improved from this point forward, and SNF placement may be more appropriate to consider. She wants to go home to to have her significant other help take care of her in a wheelchair but I am not sure this is realistic as she does not seem " sufficiently motivated.          Summary/Hospital Course:           Assessment and plan :     Cristy Bailey IS a 77 year old female admitted on 7/13/2022 for septic shock.   I have personally reviewed the daily labs, imaging studies, cultures and discussed the case with referring physician and consulting physicians.     My assessment and plan by system for this patient is as follows:    Neurology/Psychiatry:   1. Awake and following commands     Cardiovascular:   1.Hemodynamics - now compensated off pressors; we will monitor one more day in ICU and plan discharge to floor tomorrow if ok     Pulmonary/Ventilator Management:   1. On room air     GI and Nutrition :   1.  PO's     Renal/Fluids/Electrolytes:   1. S/P PRECIOUS and now with normal baseline function     Infectious Disease:   1. Per Dr. Demarco, stop dates on antibiotics scheduled (off Clinda today and off rocephin 11/9)    Endocrine:   1. Glucoses ok. She likely has adrenal insufficiency and would recommend treating adrenal insufficiency moving forward.     Hematology/Oncology:   1. Hb ok at 12.1     IV/Access:   1. Venous access -   2. Arterial access -   3.  Plan  - central access required and necessary      ICU Prophylaxis:   1. DVT: Hep Subq/ LMWH/mechanical  2. VAP: HOB 30 degrees, chlorhexidine rinse  3. Stress Ulcer: PPI/H2 blocker  4. Restraints: Nonviolent soft two point restraints required and necessary for patient safety and continued cares and good effect as patient continues to pull at necessary lines, tubes despite education and distraction. Will readdress daily.   5. IV Access - central access required and necessary for continued patient cares  6. Feeding - PO's   7. Family Update: discussed with patient at bedside   8. Disposition - ICU care         Key goals for next 24 hours:   1. Florinef added   2. Continue solucortef and off Levophed today.   3.               Interim History:              Key Medications:       ARIPiprazole  10 mg Oral QPM      aspirin  325 mg Oral Daily     atorvastatin  40 mg Oral QPM     cefTRIAXone  2 g Intravenous Q24H     enoxaparin ANTICOAGULANT  40 mg Subcutaneous Q12H     furosemide  20 mg Intravenous Q8H     hydrocortisone sodium succinate PF  100 mg Intravenous Q6H     miconazole   Topical BID     multivitamin w/minerals  1 tablet Oral Daily     [Held by provider] OLANZapine  2.5 mg Oral At Bedtime     polyethylene glycol  17 g Oral Daily     [Held by provider] potassium chloride  20 mEq Oral BID     senna-docusate  1-2 tablet Oral BID     sertraline  50 mg Oral Daily     sodium chloride (PF)  10-40 mL Intracatheter Q7 Days     sodium chloride (PF)  3 mL Intracatheter Q8H     [Held by provider] spironolactone  12.5 mg Oral Daily       dextrose 5% lactated ringers 10 mL/hr at 11/04/22 0825     - MEDICATION INSTRUCTIONS -       norepinephrine Stopped (11/04/22 0528)               Physical Examination:   Temp:  [97.2  F (36.2  C)-98.3  F (36.8  C)] 97.9  F (36.6  C)  Pulse:  [73-95] 73  Resp:  [11-25] 18  BP: ()/(45-79) 87/58  SpO2:  [89 %-97 %] 97 %    Intake/Output Summary (Last 24 hours) at 11/4/2022 1525  Last data filed at 11/4/2022 1400  Gross per 24 hour   Intake 387.35 ml   Output 955 ml   Net -567.65 ml     Wt Readings from Last 4 Encounters:   11/04/22 137.5 kg (303 lb 2.1 oz)   07/13/22 (!) 175.9 kg (387 lb 12.8 oz)   11/17/21 (!) 152.4 kg (336 lb)   03/01/19 137.2 kg (302 lb 6.4 oz)     BP - Mean:  [57-89] 66  Resp: 18    No lab results found in last 7 days.    GEN: no acute distress   HEENT: head ncat, sclera anicteric, OP patent, trachea midline   PULM: unlabored synchronous with vent, clear anteriorly    CV/COR: RRR S1S2 no gallop,  No rub, no murmur  ABD: soft nontender, obese   EXT:  Mild edema   warm  NEURO: grossly intact; mild weakness on left only   SKIN: no obvious rash; cellulitis of LLE appears improved and tenderness decreased  LINES: clean, dry intact         Data:   All data and imaging  reviewed     ROUTINE ICU LABS (Last four results)  CMP  Recent Labs   Lab 11/04/22  0522 11/03/22  1318 11/03/22  0429 11/03/22  0014 11/02/22  1833 11/02/22  1520 11/02/22  0416 11/01/22  1639 11/01/22  1634 11/01/22  1228 11/01/22  0419     --  139  --   --   --  140  --   --   --  142   POTASSIUM 3.6 3.5 3.1*  --   --   --  3.6  --  3.8  --  3.2*   CHLORIDE 108  --  109  --   --   --  108  --   --   --  114*   CO2 23  --  22  --   --   --  20  --   --   --  18*   ANIONGAP 5  --  8  --   --   --  12  --   --   --  10   *  --  130*  119* 122*   < >  --  254*   < >  --    < > 94   BUN 14  --  19  --   --   --  28  --   --   --  36*   CR 0.68  --  0.87  --   --   --  1.02  --   --   --  1.24*   GFRESTIMATED 90  --  69  --   --   --  57*  --   --   --  45*   ASHIA 7.7*  --  7.9*  --   --   --  8.2*  --   --   --  6.1*   MAG 2.2  --  2.4*  --   --   --  2.7*  --  1.4*  --   --    PHOS 2.3*  --  2.6  --   --  5.9* 1.8*  --   --   --   --    PROTTOTAL  --   --   --   --   --   --   --   --   --   --  3.6*   ALBUMIN 1.4*  --  1.6*  --   --   --  1.6*  --   --   --  1.3*   BILITOTAL  --   --   --   --   --   --   --   --   --   --  0.5   ALKPHOS  --   --   --   --   --   --   --   --   --   --  102   AST  --   --   --   --   --   --   --   --   --   --  30   ALT  --   --   --   --   --   --   --   --   --   --  14    < > = values in this interval not displayed.     CBC  Recent Labs   Lab 11/04/22  0522 11/03/22  0429 11/02/22  0416 11/01/22 0419   WBC 5.8 8.3 12.0* 12.2*   RBC 4.23 4.63 4.37 3.87   HGB 12.1 13.2 12.7 11.5*   HCT 36.4 39.6 37.0 33.5*   MCV 86 86 85 87   MCH 28.6 28.5 29.1 29.7   MCHC 33.2 33.3 34.3 34.3   RDW 18.0* 17.7* 17.3* 16.6*    263 216 197     INRNo lab results found in last 7 days.  Arterial Blood GasNo lab results found in last 7 days.    All cultures:  No results for input(s): CULT in the last 168 hours.  No results found for this or any previous visit (from the past 24  hour(s)).    MD Favio    Billing: This patient is critically ill: Yes. Total critical care time today 40 min.

## 2022-11-05 NOTE — PROGRESS NOTES
[facility]    Internal Medicine Hospitalist Progress Note  11/05/2022  I evaluated patient on the above date.    Shaka Rose Jr., MD  [phone] (p)  Text Page  Vocera        Assessment & Plan New actions/orders today (11/05/2022) are underlined.    Cristy Bailey is a 77 year old female with a PMHx significant for morbid obesity; HTN; and HLD; who presented to [facility] 7/13/2022 with complete left-sided paralysis, left-sided facial droop, and difficulty speaking. CTA head remarkable for right MCA occlusion. She was given tenecteplase and subsequently transferred to [facility] 7/13/2022 for thrombectomy.      Hospital stay complicated by development and progression of flank wound.  This subsequently required surgical debridement and placement of wound vac (8/2) that was subsequently removed (8/22).      Prolonged hospitalization related todifficulties finding LTC placement.      Septic shock, suspect due to bilateral lower extremity cellulitis, left greater than right, shock resolved  * 10/30: Patient initially developed what appeared like a rash involving the left thigh.  * 10/31: Pt with worsened erythema involving both legs with the left being more intense associated with worsening leukocytosis and elevated CRP. Started on pip-tazo and ID consulted. Developed refractory hypotension which required transfer to the ICU where PICC was placed and started on pressors. Started on stress IV steroids. BC's 10/31 NGTD. UA 10/31 abnormal, but UC growing only >100K mixed urogenital garland.  Cortisol level normal.  * 11/2: CT abdomen and pelvis 11/2 showed no focal findings of infection. CT right thigh 11/2 showed no osteomyelitis; no evidence of a discrete fluid collection to suggest an abscess; cellulitis in the abdominal wall and both proximal calves. Pip-tazo changed to ceftriaxone and clindamycin.   * 11/3: Weaned off pressors.  * 11/4: Clindamycin stopped. Started  fludrocortisone.  * 11/5: Cellulitis overall improved.  -- Transfer out of ICU.  -- Discontinue IV hydrocortisone.  -- Continue fludrocortisone for now.  -- Continue ceftriaxone (started 11/2) through 11/9.  -- Appreciate ID help.      PRECIOUS, suspect prerenal related to septic shock, resolved.  * Cr had been normal this hospitalization.  * Cr increased to 2.08 on 10/31 associated with septic shock which was treated as noted.  * CT abdomen/pelvis 11/2 negative for urinary obstructive process.  * Cr normalized 11/2.    Acute R MCA ischemic stroke with edema and right to left midline shift - s/p tenecteplase and subsequent R MCA mechanical thrombectomy 7/13/22  * Presented to [facility] ([place], [place]) 7/13 with complete paralysis, left-sided facial droop, and aphasia. Code stroke initiated. Head CT 7/13 showed signs of an evolving R MCA infarct. CTA head 7/13 showed a right carotid terminus occlusion, right middle cerebral artery M1 segment occlusion with poor opacification of the more distal right MCA branches/poor collateral flow. CTA neck 7/13 negative for acute occlusions. Pt given tenecteplase 7/13 at 13:11. Noted to be agitated and was subsequently intubated given need for procedure.   * Subsequently transferred to [facility] 7/13/2022 where she underwent above procedure.   * Extubated 7/14. Head CT 7/14 showed evolution of acute infarct involving the R MCA distribution with increased swelling, cortical effacement, and new mild right-to-left midline shift; questionable hypoattenuation involving the bilateral occipital lobes which could be artifactual.  Echo 7/14 showed LVEF 50%, grade 1 diastolic dysfunction.   * Started ASA and atorvastatin per stroke team.   * Repeat head CT 7/15 showed evolving R MCA stroke with areas of edema, overall stable.  -- Continue  mg daily, atorvastatin 40 mg daily.  -- Goal SBP <140/90.   -- Monitored on telemetry for first several wks of hospital stay without abnl rhythm so will not order 30d  cardiac event monitor at discharge per neurology recommendations (from 10/18).  -- Follow up with [facility] in 6-8 wks.    Volume overload secondary to acute diastolic CHF exacerbation, resolved  H/o essential hypertension  * Not on/needing meds PTA.   * As above, echo this stay showed EF 50% with grade I diastolic dysfunction; RV not well visualized but appeared mild-moderately dilated with global systolic function probably mildly reduced.   * BPs were initially soft this stay and required IVFs. BPs improved.   * Subsequently developed volume overload with elevated BP's and diuresed with IV furosemide, subsequently transitioned to PO furosemide.  * Issues with recurrent hypokalemia on furosemide; noted often refusing potassium.  * Started on lisinopril, but subsequently stopped due to soft BP's on furosemide.  * Furosemide increased and spironolactone added 9/3.  * Subsequently soft BP's and doses of BP meds held intermittently and subsequently developed septic shock 10/31 as noted.  * Started on IV furosemide 11/4.  -- Stop IV furosemide.  -- Continue to hold PO furosemide and spironolactone for now  -- Monitor i/o's, daily wts.    Anxiety  Cognitive impairment with intermittent situational confusion  Metabolic encephalopathy, resolved  * On no psychiatric medications PTA.  *  Pt with issues with anxiety and intermittent situational confusion this hospitalization.    * Seen by Psychiatry this hospitalization.  * Started on multiple medications this hospitalization including quetiapine, olanzapine, sertraline, mirtazapine, PRN hydroxyzine, PRN lorazepam.  * Medication regimen was adjusted/titrated. Mirtazapine, PRN lorazepam and PRN hydroxyzine subsequently stopped. See previous progress notes for more details.  *  PRN ativan stopped on 08/27/22 as leading to increased confusion. Hydroxyzine stopped as well.   -- Continue aripiprazole 10 mg qpm; olanzapine 2.5 mg at bedtime; sertraline 50 mg daily  -- Continue PRN  olanzapine 5 mg q6h     Lower back/R flank wound/abscess, suspect dt pressure type injury - s/p surgical debridement on 7/30/2022 and placement of wound vac on 8/2/2022 (subsequently removed 8/22/2022).  OA  Hx of bilateral hip replacements  Hx of chronic neck and back pain  * Patient has chronic back pain, reports having it over the last 2 years. MRI L-spine in 2018 showed multilevel degenerative changes with mild central stenosis.  * Pt with significant back pain early on in hospital stay. Was requiring IV hydromorphone.  Dose had to be decreased dt somnolence.  * During this stay, patient was noted with 5-6cm by 2-3cm wound with swelling/fluid/blistering in a fold of her lower back, did not appear infected; this seemed to be the source of her pain. Padded dressing placed and WOC RN ordered. Pain initially improved.   * Was started on a course of amoxicillin-clavulanate on 7/24.   * On 7/26, was re-evaluated by WOC RN. Wound appeared more erythematous and purulence expressed. Worsening with shear stress from lift. Procal <0.05, WBC WNL. Abx changed to IV clindamycin.  * Wound cultures obtained. On 7/28, wound grew proteus and Staph aureus (MRSA neg). US neg for abscess.   * Lost IV access on 7/28 so abx were changed to oral clindamycin and oral levaquin. IV access re-established but was continued on oral abx.  * General surgery consulted, ultimately underwent excisional debridement of R flank abscess in OR on 7/30. Intraop cultures showed polymicrobial growth with proteus mirabilis x2 strains (both pan-sensitive), corynebacterium striatum and enterococcus faecalis.  * Wound vac placed per general surgery on 8/2.  * ID consulted on 8/2 and abx narrowed to amoxicillin-clavulanate alone, completed an additional 5 days of treatment on 8/7.   * Wound vac removed 8/22.  * CT right thigh 11/2 negative for osteomyelitis or deep infection (checked for septic shock) as above.  -- Continue local wound cares per WOC RN,  follows weekly  -- Continue PRN oxycodone; minimize opioids as able.  -- Continue regular repositioning  -- Management of other pressure injuries as below     Severe malnutrition related to acute and chronic medical issues  Significant weight loss this hospitalization  * Nutritionist following. Appetite poor this stay, needing encouragement to take po.   * There was some discussion previously about needing feeding support, but noted that patient does not want to have a feeding tube.  * As of 10/19, has had 107 lb weight loss since admit (374 lbs on 7/14; 267 lbs 10/19); noted some contribution from diuresis for CHF.  -- Continue to encourage oral intake  -- Continue supplements  -- Patient does not want a feeding tube and her health care agent Leo agrees with this, consistent with previous wishes     Vaginal bleeding  Abnormal endometrial thickening on US  * Pt with vaginal bleeding intermittently this hospitalization.  * Pelvic US 10/8 showed abnormal endometrial thickening to 14 mm.   * Gyn consulted 10/9 and recommend outpatient evaluation.  * Hgb overall stable.  -- Follow-up with Gyn outpatient for endometrial sampling in the near future     Deep tissue pressure injury (DTPI) left medial thigh/groin region, hospital acquired, progressed to HAPI stage 2 pressure injury; related to purwick external catheter tubing (not the purwick device itself)  Pressure injuries, bilateral buttocks  * DTPI left medial thigh/groin region noted this hospitalization. WOC RN consulted.   * Pressure injuries in bilateral buttocks noted 9/2. Seen by WOC RN.  -- Continue local wound cares per WOC RN  -- Continue regular repositioning     Dyslipidemia  * FLP this stay showed tot cholest 163, HDL 47, LDL 91, .   * Started on atorvastatin 40 mg daily  -- Continue atorvastatin 40 mg daily     Chronic bilateral LE edema with chronic venous stasis dermatitis and chronic skin changes  Hx of LLE cellulitis  * Was hospitalized in  11/2021 for sepsis dt to pneumonia and LLE cellulitis.   * During this stay, BLE noted to be patty and edematous, no unilateral leg swelling appreciated; LLE had patchy areas of erythema, no fluctuance, no painful areas with palpation; legs warm to touch. Lymphedema consulted.   -- Cont LE elevation, lymphedema wraps  -- Continue diuretic (as above)     Hypokalemia, hypophosphatemia.  * Potassium and phosphorus low at times this hospitalization. Treated with replacement.  Recent Labs   Lab 11/05/22  0537 11/04/22  0522 11/03/22  1318 11/03/22  0429 11/02/22  1520 11/02/22  0416 11/01/22  1634   POTASSIUM 2.8* 3.6 3.5 3.1*  --  3.6 3.8   MAG 1.9 2.2  --  2.4*  --  2.7* 1.4*   PHOS 2.8 2.3*  --  2.6 5.9* 1.8*  --    - Continue PRN K and phos replacement.     Intertriginous dermatitis  -- Continue clotrimazole powder     Constipation  -- Continue scheduled senna-docusate, polyethylene glycol, PRN bisacodyl     Suspected meningioma left parietal region, incidentally seen on admission CT on 7/13/2022  * Head CT 7/13 showed a calcified extra-axial mass overlying the left parietal region measuring 1.4 cm, potentially representing a meningioma.  -- Will need serial monitoring OP after discharge     Suspected sleep apnea  * O2 drop to mid 80s overnight 8/14. Briefly placed on 4LPM, which she then removed and then slept okay with sats in 90s thereafter.  -- Consider sleep study as outpatient     Morbid obesity with significant weight loss this hospitalization  * BMI 59 on admission.  * Significant weight loss this hospitalization as above (107 lbs as of 10/19). BMI 43.2 on 10/20.  -- Continue aggressive dietary and lifestyle modifications      Goals of care  Failure to thrive  * During this hospitalization, discussed concern about overall prognosis, with her eating 0-25% of her meals and nutrition recommendations for alternative forms of nutrition; at that time, she had lost 70 lbs since admit (some of that was probably  "related to diuresis and some related to variable weights in hospital, but still significant).   * Patient and significant other/HCA Leo, reiterated her wishes for DNR/DNI and no feeding tubes.  * Palliative care met with Leo 9/28 and he did not want hospice.  -- Continue restorative cares  -- Pt is DNR/DNI, no tube feedings     OTHER RESOLVED/CHRONIC ISSUES:  Urinary retention, resolved  * Retaining urine on 8/14. UA WNL, not worrisome for infection.  * Requiring straight cath x1 on 8/20 PM.  * Subsequently good UOP, no longer requiring straight cath. No evidence of obstruction on PVR.      Dysphagia due to CVA, resolved  * Seen by SLP and noted with dysphagia. Initially required some intermittent fluid boluses.   * Was eventually able to advance to regular diet w/thin liquids.     Prolonged QTc.  * EKG on 7/13 showed QTc 494.   * Repeat EKG on 7/30 (while on levofloxacin) showed QTc stable at 475. Has since completed course of abx as below.  * Had been monitoring on telemetry this stay. No concerning findings so tele was ultimately dc'd 8/19.     Anemia, suspect dilutional component, resolved  * Hgb normal on admit. Hgb 11.5 on 7/16. No overt clinical signs of major bleeding.  * Hgb subsequently stable and later normalized.      Clinically Significant Risk Factors        # Hypokalemia: Lowest K = 2.8 mmol/L (Ref range: 3.4-5.3) in last 2 days, will replace as needed       # Hypoalbuminemia: Lowest albumin = 1.3 g/dL (Ref range: 3.5-5.2) at 11/1/2022  4:19 AM, will monitor as appropriate          # Severe Obesity: Estimated body mass index is 48.67 kg/m  as calculated from the following:    Height as of this encounter: 1.676 m (5' 5.98\").    Weight as of this encounter: 136.7 kg (301 lb 5.9 oz).   # Severe Malnutrition: based on nutrition assessment          COVID-19 testing.  COVID-19 PCR Results    COVID-19 PCR Results 11/6/21 11/18/21 7/13/22 7/30/22   SARS CoV2 PCR Negative Negative Negative Negative    " "  Comments are available for some flowsheets but are not being displayed.         COVID-19 Antibody Results, Testing for Immunity    COVID-19 Antibody Results, Testing for Immunity   No data to display.             Diet: Advance Diet as Tolerated  Regular Diet Adult Thin Liquids (level 0) (Upright position, alert, and assist as needed)  Room Service    Prophylaxis: PCD's, ambulation. Enoxaparin  Carrasquillo Catheter: PRESENT, indication: Insertion Difficulty, Strict 1-2 Hour I&O  Central Lines: PRESENT  PICC 10/31/22 Triple Lumen Right Brachial vein medial PICC line ordered STAT. Line placed w/o complication. Primary RN Updated.-Site Assessment: WDL  Code Status: No CPR- Do NOT Intubate    Disposition Plan   Expected discharge: 1-2d recommended to LTC pending bed availability.  Entered: Shaka Rose MD 11/05/2022, 9:19 AM     -- Transfer out of ICU.    I spent approximately 35 minutes bedside and on the inpatient unit today managing the care of patient. Over 50% of my time on the unit was spent in extensive chart review, counseling the patient/family and/or coordinating care regarding services listed in this note.      Interval History   Weaned off pressors yesterday.  Doing better overall.    -Data reviewed today: I reviewed all new labs and imaging over the last 24 hours. I personally reviewed no images or EKG's today.    Physical Exam    , Blood pressure 100/52, pulse 79, temperature 97.5  F (36.4  C), temperature source Axillary, resp. rate 18, height 1.676 m (5' 5.98\"), weight 136.7 kg (301 lb 5.9 oz), SpO2 96 %, not currently breastfeeding. O2 Device: None (Room air)    Vitals:    11/03/22 0430 11/04/22 0500 11/05/22 0400   Weight: 138 kg (304 lb 3.8 oz) 137.5 kg (303 lb 2.1 oz) 136.7 kg (301 lb 5.9 oz)     Vital Signs with Ranges  Temp:  [97.5  F (36.4  C)-97.9  F (36.6  C)] 97.5  F (36.4  C)  Pulse:  [73-82] 79  Resp:  [10-18] 18  BP: ()/(48-80) 100/52  SpO2:  [95 %-98 %] 96 %  Patient Vitals for the " past 24 hrs:   BP Temp Temp src Pulse Resp SpO2 Weight   11/05/22 0800 100/52 97.5  F (36.4  C) Axillary -- -- 96 % --   11/05/22 0600 -- -- -- -- 18 -- --   11/05/22 0400 99/80 97.5  F (36.4  C) Oral 79 11 95 % 136.7 kg (301 lb 5.9 oz)   11/05/22 0000 90/48 97.8  F (36.6  C) Oral 79 10 98 % --   11/04/22 2000 (!) 83/54 97.6  F (36.4  C) Oral 82 15 96 % --   11/04/22 1600 94/53 -- -- 81 13 -- --   11/04/22 1410 (!) 87/58 -- -- 73 -- -- --   11/04/22 1140 94/57 97.9  F (36.6  C) Oral 80 -- 97 % --     I/O's Last 24 hours  I/O last 3 completed shifts:  In: 220 [I.V.:220]  Out: 1675 [Urine:1675]    Constitutional: Awake, alert.  Respiratory: Diminished in bases. No crackles or wheezes.  Cardiovascular: RRR, no m/r/g.  GI: Soft, nt, nd, +BS.  Skin/Integumen: Trace to 1+ leg edema; minimal erythema left leg.  Other:        Data   Recent Labs   Lab 11/05/22  0537 11/04/22  0522 11/03/22  1318 11/03/22  0429 11/01/22  1639 11/01/22  1634 11/01/22  1228 11/01/22  0419   WBC 5.7 5.8  --  8.3   < >  --   --  12.2*   HGB 11.7 12.1  --  13.2   < >  --   --  11.5*   MCV 83 86  --  86   < >  --   --  87    233  --  263   < >  --   --  197    136  --  139   < >  --   --  142   POTASSIUM 2.8* 3.6 3.5 3.1*   < > 3.8  --  3.2*   CHLORIDE 107 108  --  109   < >  --   --  114*   CO2 25 23  --  22   < >  --   --  18*   BUN 14 14  --  19   < >  --   --  36*   CR 0.71 0.68  --  0.87   < >  --   --  1.24*   ANIONGAP 7 5  --  8   < >  --   --  10   ASHIA 7.6* 7.7*  --  7.9*   < >  --   --  6.1*   * 137*  --  130*  119*   < >  --    < > 94   ALBUMIN 1.5* 1.4*  --  1.6*   < >  --   --  1.3*   PROTTOTAL  --   --   --   --   --   --   --  3.6*   BILITOTAL  --   --   --   --   --   --   --  0.5   ALKPHOS  --   --   --   --   --   --   --  102   ALT  --   --   --   --   --   --   --  14   AST  --   --   --   --   --   --   --  30   LIPASE  --   --   --   --   --  22*  --  32*    < > = values in this interval not displayed.      Recent Labs   Lab Test 11/05/22 0537 11/04/22 0522 11/03/22 0429 11/03/22  0014   * 137* 130*  119* 122*     Recent Labs   Lab 11/05/22 0537 11/04/22 0522 11/03/22 0429 11/02/22 0416 11/01/22 0419 10/31/22  1202 10/31/22  0853   WBC 5.7 5.8 8.3 12.0* 12.2*  --  14.7*   CRP  --   --   --   --  241.0*  --  379.0*   LACT  --   --   --   --   --  2.0  --    PCAL  --  0.50* 1.65*  --   --   --  20.43*         No results found for this or any previous visit (from the past 24 hour(s)).    Medications   All medications were reviewed.    dextrose 5% lactated ringers 10 mL/hr at 11/05/22 0800     - MEDICATION INSTRUCTIONS -       norepinephrine Stopped (11/04/22 0528)       ARIPiprazole  10 mg Oral QPM     aspirin  325 mg Oral Daily     atorvastatin  40 mg Oral QPM     cefTRIAXone  2 g Intravenous Q24H     enoxaparin ANTICOAGULANT  40 mg Subcutaneous Q12H     fludrocortisone  0.1 mg Oral Daily     furosemide  20 mg Intravenous Q8H     hydrocortisone sodium succinate PF  100 mg Intravenous Q6H     miconazole   Topical BID     multivitamin w/minerals  1 tablet Oral Daily     [Held by provider] OLANZapine  2.5 mg Oral At Bedtime     polyethylene glycol  17 g Oral Daily     [Held by provider] potassium chloride  20 mEq Oral BID     potassium chloride  20 mEq Intravenous Q1H     senna-docusate  1-2 tablet Oral BID     sertraline  50 mg Oral Daily     sodium chloride (PF)  10-40 mL Intracatheter Q7 Days     sodium chloride (PF)  3 mL Intracatheter Q8H     [Held by provider] spironolactone  12.5 mg Oral Daily     artificial saliva, sore throat, bisacodyl, calcium carbonate, diclofenac, diphenhydrAMINE **OR** diphenhydrAMINE, diphenhydrAMINE **OR** diphenhydrAMINE, EPINEPHrine, hydrALAZINE, labetalol, lidocaine 4%, lidocaine (buffered or not buffered), - MEDICATION INSTRUCTIONS -, naloxone **OR** naloxone **OR** naloxone **OR** naloxone, nitroGLYcerin, OLANZapine zydis, ondansetron **OR** ondansetron, oxyCODONE,  pramoxine, sodium chloride (PF), sodium chloride (PF), sodium chloride (PF)

## 2022-11-05 NOTE — PROGRESS NOTES
Reason for admission: 7/13/2022 for thrombectomy from right MCA occlusion    Mental Status:x4  Activity: Ax2 CL. Total care  Diet: Regular w/ feeding assist as needed  Pain: Denies  Urination: FC to drain bag  Tele/Restraints/Iso: SR w/ occ PVC  LDA: PIV SL. Tripple lumen PICC SL.  Expected D/C Date: Pending LTC placement  Other Info: Skin check finds R flank wound, new dressing applied. Redness on breast, groin, and abdominal folds, and blanchable redness on buttocks. Cellulitis on BLE, look improved compared from the markings. Her Hospital stay was complicated by development and progression of flank wound.  She had surgical debridement and placement of wound vac (8/2) and was removed (8/22). She had septic shock s/t cellulits that needing ICU care somewhere end of October. Hx of CHF, anxiety, vaginal bleeding, dyslipidemia, electrolyte imbalance, and failure to thrive.

## 2022-11-05 NOTE — PROGRESS NOTES
Pt alert, teary at times, needs reassurance, reorientation. Up in chair. Skin protocol followed, frequent repo provided, skin without changes. Soft BP at times at rest. Poor appetite, borderline UO. Care conference planned this Monday. Significant other by bedside, updated. All pt's belongings (including wheel chair) send with pt to 2669.1.

## 2022-11-05 NOTE — PLAN OF CARE
Neuro: Alert to self, yells out instead of using call light, anxious and demanding at times   CV: SR/ST with occ PVCs, pt refusing BP checks, able to check BP x3 this shift. MAP>65.   Respiratory: on RA, LS clear/diminished   GI/: Carrasquillo in place for strict I/Os, lasix given x2 this shift. Stool incontinence x2, loose brown BMs   Skin: see flowsheet   Activity: Turned q2h   Diet: Regular, poor appetite   Plan: Care conference on Monday

## 2022-11-06 NOTE — PROGRESS NOTES
11/06/22 1100   Appointment Info   Signing Clinician's Name / Credentials (PT) Suha Vizcarra DPT   Living Environment   People in Home friend(s)   Current Living Arrangements house   Home Accessibility wheelchair accessible   Transportation Anticipated family or friend will provide   Self-Care   Usual Activity Tolerance poor   Current Activity Tolerance poor   Equipment Currently Used at Home grab bar, tub/shower;raised toilet seat;shower chair;walker, rolling;wheelchair, manual   Fall history within last six months yes   Number of times patient has fallen within last six months 1   General Information   Onset of Illness/Injury or Date of Surgery 07/13/22   Referring Physician Rox Chen MD   Patient/Family Therapy Goals Statement (PT) To get stronger   Pertinent History of Current Problem (include personal factors and/or comorbidities that impact the POC) Cristy Bailey is a 77 year old female with a PMH significant for morbid obesity, hypertension (not on/needing meds), and hyperlipidemia (not on/needing meds), who presented to [facility] 7/13/2022 with complete left-sided paralysis, left-sided facial droop, and difficulty speaking. CTA head remarkable for right MCA occlusion. She was given tenecteplase and subsequently transferred to [facility] 7/13/2022 for thrombectomy. Hospital stay complicated by development and progression of flank wound.  This subsequently required surgical debridement and placement of wound vac (8/2) that was subsequently removed (8/22).      Prolonged hospitalization related todifficulties finding LTC placement   Existing Precautions/Restrictions fall   General Observations Pt supine in bed upon therapist arrival, agreeable to therapy   Cognition   Affect/Mental Status (Cognition) confused   Orientation Status (Cognition) oriented to;person   Follows Commands (Cognition) follows one-step commands;over 90% accuracy   Pain Assessment   Patient Currently in Pain    (Pain in back)   Integumentary/Edema   Integumentary/Edema Comments edema bilateral LEs, and skin changes noted   Range of Motion (ROM)   Range of Motion ROM deficits secondary to pain;ROM deficits secondary to weakness   ROM Comment deconditioned at baseline d/t mostly wheelchair bound and morbid obesity   Strength (Manual Muscle Testing)   Strength (Manual Muscle Testing) Deficits observed during functional mobility   Strength Comments unable to perform SLR, lift for mobility at this time. Pt able to perform AP and SAQ supine   Bed Mobility   Comment, (Bed Mobility) Pt rolled to the R w/ Max A x1   Transfers   Comment, (Transfers) Not tested d/t weakness and pain   Gait/Stairs (Locomotion)   Comment, (Gait/Stairs) Not tested d/t weakness and pain   Clinical Impression   Criteria for Skilled Therapeutic Intervention Yes, treatment indicated   PT Diagnosis (PT) Impaired functional mobility   Influenced by the following impairments Pain, weakness, decreased activity tolerance   Functional limitations due to impairments Limited functional mobility requiring assist   Clinical Presentation (PT Evaluation Complexity) Evolving/Changing   Clinical Presentation Rationale clinical judgement, co morbidities, cognition   Clinical Decision Making (Complexity) low complexity   Planned Therapy Interventions (PT) home exercise program;patient/family education;bed mobility training;transfer training;progressive activity/exercise;wheelchair management/propulsion training   Anticipated Equipment Needs at Discharge (PT) lift device   Risk & Benefits of therapy have been explained evaluation/treatment results reviewed;care plan/treatment goals reviewed;risks/benefits reviewed;patient   PT Total Evaluation Time   PT Brandon, Low Complexity Minutes (98227) 10   Plan of Care Review   Plan of Care Reviewed With patient   Physical Therapy Goals   PT Frequency 3x/week   PT Predicted Duration/Target Date for Goal Attainment 11/13/22   PT Goals  Bed Mobility;Transfers   PT: Bed Mobility Minimal assist;Rolling   PT: Transfers Maximum assist;Bed to/from chair;Assistive device;Sit to/from stand   PT Discharge Planning   PT Discharge Recommendation (DC Rec) LTACH, pending meets medical criteria;Transitional Care Facility   PT Rationale for DC Rec Pt was previously stand pivot to her wc at baseline. Pt very fearful of falling and often perseverates during session. Difficult to keep focused. Patient able to roll w/ Max A, lift for transfers. If LTACH criteria not met, could trial a TCU if pt willing and motivated to conitnue to try progression of mobility.   PT Brief overview of current status Ax1 for supine rolling, Ax2 for all other mobility, recommend lift for nursing, SS for transfer training.   Total Session Time   Total Session Time (sum of timed and untimed services) 10

## 2022-11-06 NOTE — PROGRESS NOTES
Patient is AO X2, refusing with nursing cares, refusing meds, refusing to eat, refusing turns, saying no to any cares and support. Erick Sellers RN on 11/6/2022 at 11:02 AM

## 2022-11-06 NOTE — PROGRESS NOTES
[facility]    Medicine Progress Note - Hospitalist Service    Date of Admission:  7/13/2022    Assessment & Plan   New actions/orders today (11/05/2022) are underlined.    Cristy Bailey is a 77 year old female with a PMHx significant for morbid obesity; HTN; and HLD; who presented to [facility] 7/13/2022 with complete left-sided paralysis, left-sided facial droop, and difficulty speaking. CTA head remarkable for right MCA occlusion. She was given tenecteplase and subsequently transferred to [facility] 7/13/2022 for thrombectomy.      Hospital stay complicated by development and progression of flank wound.  This subsequently required surgical debridement and placement of wound vac (8/2) that was subsequently removed (8/22).      Prolonged hospitalization related to difficulties finding LTC placement.      Septic shock, suspect due to bilateral lower extremity cellulitis, left greater than right, shock resolved  * 10/30: Patient initially developed what appeared like a rash involving the left thigh.  * 10/31: Pt with worsened erythema involving both legs with the left being more intense associated with worsening leukocytosis and elevated CRP. Started on pip-tazo and ID consulted. Developed refractory hypotension which required transfer to the ICU where PICC was placed and started on pressors. Started on stress IV steroids. BC's 10/31 NGTD. UA 10/31 abnormal, but UC growing only >100K mixed urogenital garland.  Cortisol level normal.  * 11/2: CT abdomen and pelvis 11/2 showed no focal findings of infection. CT right thigh 11/2 showed no osteomyelitis; no evidence of a discrete fluid collection to suggest an abscess; cellulitis in the abdominal wall and both proximal calves. Pip-tazo changed to ceftriaxone and clindamycin.   * 11/3: Weaned off pressors.  * 11/4: Clindamycin stopped. Started fludrocortisone.  * 11/5: Cellulitis overall improved.  -- Transfer out of ICU 11/5.  --  Discontinue IV hydrocortisone.  -- Continue fludrocortisone for now.  -- Continue ceftriaxone (started 11/2) through 11/9.  -- Appreciate ID help.      PRECIOUS, suspect prerenal related to septic shock, resolved.  * Cr had been normal this hospitalization.  * Cr increased to 2.08 on 10/31 associated with septic shock which was treated as noted.  * CT abdomen/pelvis 11/2 negative for urinary obstructive process.  * Cr normalized 11/2.    Acute R MCA ischemic stroke with edema and right to left midline shift - s/p tenecteplase and subsequent R MCA mechanical thrombectomy 7/13/22  * Presented to [facility] 7/13 with complete paralysis, left-sided facial droop, and aphasia. Code stroke initiated. Head CT 7/13 showed signs of an evolving R MCA infarct. CTA head 7/13 showed a right carotid terminus occlusion, right middle cerebral artery M1 segment occlusion with poor opacification of the more distal right MCA branches/poor collateral flow. CTA neck 7/13 negative for acute occlusions. Pt given tenecteplase 7/13 at 13:11. Noted to be agitated and was subsequently intubated given need for procedure.   * Subsequently transferred to [facility] 7/13/2022 where she underwent above procedure.   * Extubated 7/14. Head CT 7/14 showed evolution of acute infarct involving the R MCA distribution with increased swelling, cortical effacement, and new mild right-to-left midline shift; questionable hypoattenuation involving the bilateral occipital lobes which could be artifactual.  Echo 7/14 showed LVEF 50%, grade 1 diastolic dysfunction.   * Started ASA and atorvastatin per stroke team.   * Repeat head CT 7/15 showed evolving R MCA stroke with areas of edema, overall stable.  -- Continue  mg daily, atorvastatin 40 mg daily.  -- Goal SBP <140/90.   -- Monitored on telemetry for first several wks of hospital stay without abnl rhythm so will not order 30d cardiac event monitor at discharge per neurology recommendations (from 10/18).  --  Follow up with [facility] in 6-8 wks.    Volume overload secondary to acute diastolic CHF exacerbation, resolved  H/o essential hypertension  * Not on/needing meds PTA.   * As above, echo this stay showed EF 50% with grade I diastolic dysfunction; RV not well visualized but appeared mild-moderately dilated with global systolic function probably mildly reduced.   * BPs were initially soft this stay and required IVFs. BPs improved.   * Subsequently developed volume overload with elevated BP's and diuresed with IV furosemide, subsequently transitioned to PO furosemide.  * Issues with recurrent hypokalemia on furosemide; noted often refusing potassium.  * Started on lisinopril, but subsequently stopped due to soft BP's on furosemide.  * Furosemide increased and spironolactone added 9/3.  * Subsequently soft BP's and doses of BP meds held intermittently and subsequently developed septic shock 10/31 as noted.  * Started on IV furosemide 11/4.  -- Stop IV furosemide.  -- Continue to hold PO furosemide and spironolactone for now  -- Monitor i/o's, daily wts.    Anxiety  Cognitive impairment with intermittent situational confusion  Metabolic encephalopathy, resolved  * On no psychiatric medications PTA.  *  Pt with issues with anxiety and intermittent situational confusion this hospitalization.    * Seen by Psychiatry this hospitalization.  * Started on multiple medications this hospitalization including quetiapine, olanzapine, sertraline, mirtazapine, PRN hydroxyzine, PRN lorazepam.  * Medication regimen was adjusted/titrated. Mirtazapine, PRN lorazepam and PRN hydroxyzine subsequently stopped. See previous progress notes for more details.  *  PRN ativan stopped on 08/27/22 as leading to increased confusion. Hydroxyzine stopped as well.   -- Continue aripiprazole 10 mg qpm; olanzapine 2.5 mg at bedtime; sertraline 50 mg daily  -- Continue PRN olanzapine 5 mg q6h     Lower back/R flank wound/abscess, suspect dt pressure type injury  - s/p surgical debridement on 7/30/2022 and placement of wound vac on 8/2/2022 (subsequently removed 8/22/2022).  OA  Hx of bilateral hip replacements  Hx of chronic neck and back pain  * Patient has chronic back pain, reports having it over the last 2 years. MRI L-spine in 2018 showed multilevel degenerative changes with mild central stenosis.  * Pt with significant back pain early on in hospital stay. Was requiring IV hydromorphone.  Dose had to be decreased dt somnolence.  * During this stay, patient was noted with 5-6cm by 2-3cm wound with swelling/fluid/blistering in a fold of her lower back, did not appear infected; this seemed to be the source of her pain. Padded dressing placed and WOC RN ordered. Pain initially improved.   * Was started on a course of amoxicillin-clavulanate on 7/24.   * On 7/26, was re-evaluated by WOC RN. Wound appeared more erythematous and purulence expressed. Worsening with shear stress from lift. Procal <0.05, WBC WNL. Abx changed to IV clindamycin.  * Wound cultures obtained. On 7/28, wound grew proteus and Staph aureus (MRSA neg). US neg for abscess.   * Lost IV access on 7/28 so abx were changed to oral clindamycin and oral levaquin. IV access re-established but was continued on oral abx.  * General surgery consulted, ultimately underwent excisional debridement of R flank abscess in OR on 7/30. Intraop cultures showed polymicrobial growth with proteus mirabilis x2 strains (both pan-sensitive), corynebacterium striatum and enterococcus faecalis.  * Wound vac placed per general surgery on 8/2.  * ID consulted on 8/2 and abx narrowed to amoxicillin-clavulanate alone, completed an additional 5 days of treatment on 8/7.   * Wound vac removed 8/22.  * CT right thigh 11/2 negative for osteomyelitis or deep infection (checked for septic shock) as above.  -- Continue local wound cares per WOC RN, follows weekly  -- Continue PRN oxycodone; minimize opioids as able.  -- Continue regular  repositioning  -- Management of other pressure injuries as below     Severe malnutrition related to acute and chronic medical issues  Significant weight loss this hospitalization  * Nutritionist following. Appetite poor this stay, needing encouragement to take po.   * There was some discussion previously about needing feeding support, but noted that patient does not want to have a feeding tube.  * As of 10/19, has had 107 lb weight loss since admit (374 lbs on 7/14; 267 lbs 10/19); noted some contribution from diuresis for CHF.  -- Continue to encourage oral intake  -- Continue supplements  -- Patient does not want a feeding tube and her health care agent Leo agrees with this, consistent with previous wishes     Vaginal bleeding  Abnormal endometrial thickening on US  * Pt with vaginal bleeding intermittently this hospitalization.  * Pelvic US 10/8 showed abnormal endometrial thickening to 14 mm.   * Gyn consulted 10/9 and recommend outpatient evaluation.  * Hgb overall stable.  -- Follow-up with Gyn outpatient for endometrial sampling in the near future     Deep tissue pressure injury (DTPI) left medial thigh/groin region, hospital acquired, progressed to HAPI stage 2 pressure injury; related to purwick external catheter tubing (not the purwick device itself)  Pressure injuries, bilateral buttocks  * DTPI left medial thigh/groin region noted this hospitalization. WOC RN consulted.   * Pressure injuries in bilateral buttocks noted 9/2. Seen by WOC RN.  -- Continue local wound cares per WOC RN  -- Continue regular repositioning     Dyslipidemia  * FLP this stay showed tot cholest 163, HDL 47, LDL 91, .   * Started on atorvastatin 40 mg daily  -- Continue atorvastatin 40 mg daily     Chronic bilateral LE edema with chronic venous stasis dermatitis and chronic skin changes  Hx of LLE cellulitis  * Was hospitalized in 11/2021 for sepsis dt to pneumonia and LLE cellulitis.   * During this stay, BLE noted to be  patty and edematous, no unilateral leg swelling appreciated; LLE had patchy areas of erythema, no fluctuance, no painful areas with palpation; legs warm to touch. Lymphedema consulted.   -- Cont LE elevation, lymphedema wraps  -- Continue diuretic (as above)     Hypokalemia, hypophosphatemia.  * Potassium and phosphorus low at times this hospitalization. Treated with replacement.  Recent Labs   Lab 11/05/22  0537 11/04/22  0522 11/03/22  1318 11/03/22  0429 11/02/22  1520 11/02/22  0416 11/01/22  1634   POTASSIUM 2.8* 3.6 3.5 3.1*  --  3.6 3.8   MAG 1.9 2.2  --  2.4*  --  2.7* 1.4*   PHOS 2.8 2.3*  --  2.6 5.9* 1.8*  --    - Continue PRN K and phos replacement.     Intertriginous dermatitis  -- Continue clotrimazole powder     Constipation  -- Continue scheduled senna-docusate, polyethylene glycol, PRN bisacodyl     Suspected meningioma left parietal region, incidentally seen on admission CT on 7/13/2022  * Head CT 7/13 showed a calcified extra-axial mass overlying the left parietal region measuring 1.4 cm, potentially representing a meningioma.  -- Will need serial monitoring OP after discharge     Suspected sleep apnea  * O2 drop to mid 80s overnight 8/14. Briefly placed on 4LPM, which she then removed and then slept okay with sats in 90s thereafter.  -- Consider sleep study as outpatient     Morbid obesity with significant weight loss this hospitalization  * BMI 59 on admission.  * Significant weight loss this hospitalization as above (107 lbs as of 10/19). BMI 43.2 on 10/20.  -- Continue aggressive dietary and lifestyle modifications      Goals of care  Failure to thrive  * During this hospitalization, discussed concern about overall prognosis, with her eating 0-25% of her meals and nutrition recommendations for alternative forms of nutrition; at that time, she had lost 70 lbs since admit (some of that was probably related to diuresis and some related to variable weights in hospital, but still significant).   *  Patient and significant other/HCA Leo, reiterated her wishes for DNR/DNI and no feeding tubes.  * Palliative care met with Leo 9/28 and he did not want hospice.  -- Continue restorative cares  -- Pt is DNR/DNI, no tube feedings     OTHER RESOLVED/CHRONIC ISSUES:  Urinary retention, resolved  * Retaining urine on 8/14. UA WNL, not worrisome for infection.  * Requiring straight cath x1 on 8/20 PM.  * Subsequently good UOP, no longer requiring straight cath. No evidence of obstruction on PVR.      Dysphagia due to CVA, resolved  * Seen by SLP and noted with dysphagia. Initially required some intermittent fluid boluses.   * Was eventually able to advance to regular diet w/thin liquids.     Prolonged QTc.  * EKG on 7/13 showed QTc 494.   * Repeat EKG on 7/30 (while on levofloxacin) showed QTc stable at 475. Has since completed course of abx as below.  * Had been monitoring on telemetry this stay. No concerning findings so tele was ultimately dc'd 8/19.     Anemia, suspect dilutional component, resolved  * Hgb normal on admit. Hgb 11.5 on 7/16. No overt clinical signs of major bleeding.  * Hgb subsequently stable and later normalized.           Diet: Advance Diet as Tolerated  Regular Diet Adult Thin Liquids (level 0) (Upright position, alert, and assist as needed)  Room Service    DVT Prophylaxis: Enoxaparin (Lovenox) SQ  Carr[place]quillo Catheter: PRESENT, indication: Insertion Difficulty, Strict 1-2 Hour I&O  Central Lines: PRESENT  PICC 10/31/22 Triple Lumen Right Brachial vein medial PICC line ordered STAT. Line placed w/o complication. Primary RN Updated.-Site Assessment: WDL  Cardiac Monitoring: ACTIVE order. Indication: QTc prolonging medication (48 hours)  Code Status: No CPR- Do NOT Intubate      Disposition Plan      Expected Discharge Date: 11/08/2022    Discharge Delays: Placement - LTC    Discharge Comments: Multiple referrerals out  Did receive her 2nd covid shot  LTC placement        The patient's care was  "discussed with the Patient.    Rox Chen MD  Hospitalist Service  [facility]  Securely message with the [email or web address] Web Console (learn more here)  Text page via [email or web address] Paging/Directory         Clinically Significant Risk Factors        # Hypokalemia: Lowest K = 2.8 mmol/L (Ref range: 3.4-5.3) in last 2 days, will replace as needed       # Hypoalbuminemia: Lowest albumin = 1.3 g/dL (Ref range: 3.5-5.2) at 11/1/2022  4:19 AM, will monitor as appropriate          # Severe Obesity: Estimated body mass index is 47.78 kg/m  as calculated from the following:    Height as of this encounter: 1.676 m (5' 5.98\").    Weight as of this encounter: 134.2 kg (295 lb 13.7 oz).   # Severe Malnutrition: based on nutrition assessment        ______________________________________________________________________    Interval History     Complains of feeling very weak.  Complains of redness of her legs and pain in her feet.  Denies any nausea    Data reviewed today: I reviewed all medications, new labs and imaging results over the last 24 hours. I personally reviewed no images or EKG's today.    Physical Exam   Vital Signs: Temp: 97.8  F (36.6  C) Temp src: Axillary BP: 107/53 Pulse: 71   Resp: 16 SpO2: 96 % O2 Device: None (Room air)    Weight: 295 lbs 13.72 oz  General Appearance: Obese , no distress  Respiratory: Lungs clear  Cardiovascular: Rate controlled  GI: Nontender  Skin: No rash  Extremities: 1+ pitting edema bilaterally with redness over both shins without significant warmth.    Data   Recent Labs   Lab 11/06/22  0621 11/05/22  1321 11/05/22  0537 11/04/22  0522 11/01/22  1639 11/01/22  1634 11/01/22  1228 11/01/22  0419   WBC 7.9  --  5.7 5.8   < >  --   --  12.2*   HGB 12.7  --  11.7 12.1   < >  --   --  11.5*   MCV 86  --  83 86   < >  --   --  87     --  237 233   < >  --   --  197     --  139 136   < >  --   --  142   POTASSIUM 3.1* 3.7 2.8* 3.6   < > 3.8  --  3.2*   CHLORIDE 109  " --  107 108   < >  --   --  114*   CO2 23  --  25 23   < >  --   --  18*   BUN 15  --  14 14   < >  --   --  36*   CR 0.68  --  0.71 0.68   < >  --   --  1.24*   ANIONGAP 9  --  7 5   < >  --   --  10   ASHIA 8.0*  --  7.6* 7.7*   < >  --   --  6.1*   GLC 87  --  124* 137*   < >  --    < > 94   ALBUMIN  --   --  1.5* 1.4*   < >  --   --  1.3*   PROTTOTAL  --   --   --   --   --   --   --  3.6*   BILITOTAL  --   --   --   --   --   --   --  0.5   ALKPHOS  --   --   --   --   --   --   --  102   ALT  --   --   --   --   --   --   --  14   AST  --   --   --   --   --   --   --  30   LIPASE  --   --   --   --   --  22*  --  32*    < > = values in this interval not displayed.     No results found for this or any previous visit (from the past 24 hour(s)).  Medications     dextrose 5% lactated ringers 10 mL/hr at 11/05/22 0800     - MEDICATION INSTRUCTIONS -       norepinephrine Stopped (11/04/22 0528)       ARIPiprazole  10 mg Oral QPM     aspirin  325 mg Oral Daily     atorvastatin  40 mg Oral QPM     cefTRIAXone  2 g Intravenous Q24H     enoxaparin ANTICOAGULANT  40 mg Subcutaneous Q12H     fludrocortisone  0.1 mg Oral Daily     miconazole   Topical BID     multivitamin w/minerals  1 tablet Oral Daily     [Held by provider] OLANZapine  2.5 mg Oral At Bedtime     polyethylene glycol  17 g Oral Daily     potassium & sodium phosphates  1 packet Oral or Feeding Tube Q4H     [Held by provider] potassium chloride  20 mEq Oral BID     senna-docusate  1-2 tablet Oral BID     sertraline  50 mg Oral Daily     sodium chloride (PF)  10-40 mL Intracatheter Q7 Days     sodium chloride (PF)  3 mL Intracatheter Q8H     [Held by provider] spironolactone  12.5 mg Oral Daily

## 2022-11-06 NOTE — PLAN OF CARE
Pt is A&Ox2-3 Disoriented to time/situation. Cooperative, but anxious at times. VSS RA.  Lift, Assist X2. T/R q2hr. Denies pain and nausea. On regular diet; poor appetite. Sips of water with meds, but  would like pills to be crashed per patient, hard to swallow even when breaks in to half. Incontinent of BB. Carrasquillo in place with adequate output.  No vaginal blood noted this shift. Wound of R flank DCI. Redness on L and R thigh,   to LLE and R hip. Awaiting for LTC placement. Will continue to monitor.

## 2022-11-07 NOTE — PLAN OF CARE
Confused, calling out. T/R q2h as patient allows. Ate 50% of meal. Endorses pain but declines interventions other than icepacks. Mepilex to sacrum and back. Refused meds. Carrasquillo patent with dark urine. Encouraged fludis. Edematous. Placement pending.

## 2022-11-07 NOTE — PROGRESS NOTES
[facility]    Medicine Progress Note - Hospitalist Service    Date of Admission:  7/13/2022    Assessment & Plan   Cristy Bailey is a 77 year old female with a PMHx significant for morbid obesity; HTN; and HLD; who presented to [facility] 7/13/2022 with complete left-sided paralysis, left-sided facial droop, and difficulty speaking. CTA head remarkable for right MCA occlusion. She was given tenecteplase and subsequently transferred to [facility] 7/13/2022 for thrombectomy.   Hospital stay complicated by development and progression of flank wound.  This subsequently required surgical debridement and placement of wound vac (8/2) that was subsequently removed (8/22).   Prolonged hospitalization related to difficulties finding LTC placement.     Septic shock, suspect due to bilateral lower extremity cellulitis, left greater than right, shock resolved  * 10/30: Patient initially developed what appeared like a rash involving the left thigh.  * 10/31: Pt with worsened erythema involving both legs with the left being more intense associated with worsening leukocytosis and elevated CRP. Started on pip-tazo and ID consulted. Developed refractory hypotension which required transfer to the ICU where PICC was placed and started on pressors. Started on stress IV steroids. BC's 10/31 NGTD. UA 10/31 abnormal, but UC growing only >100K mixed urogenital garland.  Cortisol level normal.  * 11/2: CT abdomen and pelvis 11/2 showed no focal findings of infection. CT right thigh 11/2 showed no osteomyelitis; no evidence of a discrete fluid collection to suggest an abscess; cellulitis in the abdominal wall and both proximal calves. Pip-tazo changed to ceftriaxone and clindamycin.   * 11/3: Weaned off pressors.  * 11/4: Clindamycin stopped. Started fludrocortisone.  * 11/5: Cellulitis overall improved.  -- Transfer out of ICU 11/5.  -- Discontinue IV hydrocortisone.  -- Continue fludrocortisone for  now.  -- Continue ceftriaxone (started 11/2) through 11/9.  -- Appreciate ID help.      PRECIOUS, suspect prerenal related to septic shock, resolved.  * Cr had been normal this hospitalization.  * Cr increased to 2.08 on 10/31 associated with septic shock which was treated as noted.  * CT abdomen/pelvis 11/2 negative for urinary obstructive process.  * Cr normalized 11/2.     Acute R MCA ischemic stroke with edema and right to left midline shift - s/p tenecteplase and subsequent R MCA mechanical thrombectomy 7/13/22  * Presented to [facility] 7/13 with complete paralysis, left-sided facial droop, and aphasia. Code stroke initiated. Head CT 7/13 showed signs of an evolving R MCA infarct. CTA head 7/13 showed a right carotid terminus occlusion, right middle cerebral artery M1 segment occlusion with poor opacification of the more distal right MCA branches/poor collateral flow. CTA neck 7/13 negative for acute occlusions. Pt given tenecteplase 7/13 at 13:11. Noted to be agitated and was subsequently intubated given need for procedure.   * Subsequently transferred to [facility] 7/13/2022 where she underwent above procedure.   * Extubated 7/14. Head CT 7/14 showed evolution of acute infarct involving the R MCA distribution with increased swelling, cortical effacement, and new mild right-to-left midline shift; questionable hypoattenuation involving the bilateral occipital lobes which could be artifactual.  Echo 7/14 showed LVEF 50%, grade 1 diastolic dysfunction.   * Started ASA and atorvastatin per stroke team.   * Repeat head CT 7/15 showed evolving R MCA stroke with areas of edema, overall stable.  -- Continue  mg daily, atorvastatin 40 mg daily.  -- Goal SBP <140/90.   -- Monitored on telemetry for first several wks of hospital stay without abnl rhythm so will not order 30d cardiac event monitor at discharge per neurology recommendations (from 10/18).  -- Follow up with [facility] in 6-8 wks.     Volume overload secondary to  acute diastolic CHF exacerbation, resolved  H/o essential hypertension  * Not on/needing meds PTA.   * As above, echo this stay showed EF 50% with grade I diastolic dysfunction; RV not well visualized but appeared mild-moderately dilated with global systolic function probably mildly reduced.   * BPs were initially soft this stay and required IVFs. BPs improved.   * Subsequently developed volume overload with elevated BP's and diuresed with IV furosemide, subsequently transitioned to PO furosemide.  * Issues with recurrent hypokalemia on furosemide; noted often refusing potassium.  * Started on lisinopril, but subsequently stopped due to soft BP's on furosemide.  * Furosemide increased and spironolactone added 9/3.  * Subsequently soft BP's and doses of BP meds held intermittently and subsequently developed septic shock 10/31 as noted.  * Started on IV furosemide 11/4.  -- Stop IV furosemide.  -- Continue to hold PO furosemide and spironolactone for now  -- Monitor i/o's, daily wts.     Anxiety  Cognitive impairment with intermittent situational confusion  Metabolic encephalopathy, resolved  * On no psychiatric medications PTA.  *  Pt with issues with anxiety and intermittent situational confusion this hospitalization.    * Seen by Psychiatry this hospitalization.  * Started on multiple medications this hospitalization including quetiapine, olanzapine, sertraline, mirtazapine, PRN hydroxyzine, PRN lorazepam.  * Medication regimen was adjusted/titrated. Mirtazapine, PRN lorazepam and PRN hydroxyzine subsequently stopped. See previous progress notes for more details.  *  PRN ativan stopped on 08/27/22 as leading to increased confusion. Hydroxyzine stopped as well.   -- Continue aripiprazole 10 mg qpm; olanzapine 2.5 mg at bedtime; sertraline 50 mg daily  -- Continue PRN olanzapine 5 mg q6h     Lower back/R flank wound/abscess, suspect dt pressure type injury - s/p surgical debridement on 7/30/2022 and placement of  wound vac on 8/2/2022 (subsequently removed 8/22/2022).  OA  Hx of bilateral hip replacements  Hx of chronic neck and back pain  * Patient has chronic back pain, reports having it over the last 2 years. MRI L-spine in 2018 showed multilevel degenerative changes with mild central stenosis.  * Pt with significant back pain early on in hospital stay. Was requiring IV hydromorphone.  Dose had to be decreased dt somnolence.  * During this stay, patient was noted with 5-6cm by 2-3cm wound with swelling/fluid/blistering in a fold of her lower back, did not appear infected; this seemed to be the source of her pain. Padded dressing placed and WOC RN ordered. Pain initially improved.   * Was started on a course of amoxicillin-clavulanate on 7/24.   * On 7/26, was re-evaluated by WOC RN. Wound appeared more erythematous and purulence expressed. Worsening with shear stress from lift. Procal <0.05, WBC WNL. Abx changed to IV clindamycin.  * Wound cultures obtained. On 7/28, wound grew proteus and Staph aureus (MRSA neg). US neg for abscess.   * Lost IV access on 7/28 so abx were changed to oral clindamycin and oral levaquin. IV access re-established but was continued on oral abx.  * General surgery consulted, ultimately underwent excisional debridement of R flank abscess in OR on 7/30. Intraop cultures showed polymicrobial growth with proteus mirabilis x2 strains (both pan-sensitive), corynebacterium striatum and enterococcus faecalis.  * Wound vac placed per general surgery on 8/2.  * ID consulted on 8/2 and abx narrowed to amoxicillin-clavulanate alone, completed an additional 5 days of treatment on 8/7.   * Wound vac removed 8/22.  * CT right thigh 11/2 negative for osteomyelitis or deep infection (checked for septic shock) as above.  -- Continue local wound cares per WOC RN, follows weekly  -- Continue PRN oxycodone; minimize opioids as able.  -- Continue regular repositioning  -- Management of other pressure injuries as  below     Severe malnutrition related to acute and chronic medical issues  Significant weight loss this hospitalization  * Nutritionist following. Appetite poor this stay, needing encouragement to take po.   * There was some discussion previously about needing feeding support, but noted that patient does not want to have a feeding tube.  * As of 10/19, has had 107 lb weight loss since admit (374 lbs on 7/14; 267 lbs 10/19); noted some contribution from diuresis for CHF.  -- Continue to encourage oral intake  -- Continue supplements  -- Patient does not want a feeding tube and her health care agent Leo agrees with this, consistent with previous wishes     Vaginal bleeding  Abnormal endometrial thickening on US  * Pt with vaginal bleeding intermittently this hospitalization.  * Pelvic US 10/8 showed abnormal endometrial thickening to 14 mm.   * Gyn consulted 10/9 and recommend outpatient evaluation.  * Hgb overall stable.  -- Follow-up with Gyn outpatient for endometrial sampling in the near future     Deep tissue pressure injury (DTPI) left medial thigh/groin region, hospital acquired, progressed to HAPI stage 2 pressure injury; related to purwick external catheter tubing (not the purwick device itself)  Pressure injuries, bilateral buttocks  * DTPI left medial thigh/groin region noted this hospitalization. WOC RN consulted.   * Pressure injuries in bilateral buttocks noted 9/2. Seen by WOC RN.  -- Continue local wound cares per WOC RN  -- Continue regular repositioning     Dyslipidemia  * FLP this stay showed tot cholest 163, HDL 47, LDL 91, .   * Started on atorvastatin 40 mg daily  -- Continue atorvastatin 40 mg daily     Chronic bilateral LE edema with chronic venous stasis dermatitis and chronic skin changes  Hx of LLE cellulitis  * Was hospitalized in 11/2021 for sepsis dt to pneumonia and LLE cellulitis.   * During this stay, BLE noted to be patty and edematous, no unilateral leg swelling appreciated;  LLE had patchy areas of erythema, no fluctuance, no painful areas with palpation; legs warm to touch. Lymphedema consulted.   -- Cont LE elevation, lymphedema wraps  -- Continue diuretic (as above)     Hypokalemia, hypophosphatemia.  * Potassium and phosphorus low at times this hospitalization. Treated with replacement.            Recent Labs   Lab 11/05/22  0537 11/04/22  0522 11/03/22  1318 11/03/22  0429 11/02/22  1520 11/02/22  0416 11/01/22  1634   POTASSIUM 2.8* 3.6 3.5 3.1*  --  3.6 3.8   MAG 1.9 2.2  --  2.4*  --  2.7* 1.4*   PHOS 2.8 2.3*  --  2.6 5.9* 1.8*  --    - Continue PRN K and phos replacement.     Intertriginous dermatitis  -- Continue clotrimazole powder     Constipation  -- Continue scheduled senna-docusate, polyethylene glycol, PRN bisacodyl     Suspected meningioma left parietal region, incidentally seen on admission CT on 7/13/2022  * Head CT 7/13 showed a calcified extra-axial mass overlying the left parietal region measuring 1.4 cm, potentially representing a meningioma.  -- Will need serial monitoring OP after discharge     Suspected sleep apnea  * O2 drop to mid 80s overnight 8/14. Briefly placed on 4LPM, which she then removed and then slept okay with sats in 90s thereafter.  -- Consider sleep study as outpatient     Morbid obesity with significant weight loss this hospitalization  * BMI 59 on admission.  * Significant weight loss this hospitalization as above (107 lbs as of 10/19). BMI 43.2 on 10/20.  -- Continue aggressive dietary and lifestyle modifications      Goals of care  Failure to thrive  * During this hospitalization, discussed concern about overall prognosis, with her eating 0-25% of her meals and nutrition recommendations for alternative forms of nutrition; at that time, she had lost 70 lbs since admit (some of that was probably related to diuresis and some related to variable weights in hospital, but still significant).   * Patient and significant other/HCA yuly Bailey  her wishes for DNR/DNI and no feeding tubes.  * Palliative care met with Leo 9/28 and he did not want hospice.  -- Continue restorative cares  -- Pt is DNR/DNI, no tube feedings     OTHER RESOLVED/CHRONIC ISSUES:  Urinary retention, resolved  * Retaining urine on 8/14. UA WNL, not worrisome for infection.  * Requiring straight cath x1 on 8/20 PM.  * Subsequently good UOP, no longer requiring straight cath. No evidence of obstruction on PVR.      Dysphagia due to CVA, resolved  * Seen by SLP and noted with dysphagia. Initially required some intermittent fluid boluses.   * Was eventually able to advance to regular diet w/thin liquids.     Prolonged QTc.  * EKG on 7/13 showed QTc 494.   * Repeat EKG on 7/30 (while on levofloxacin) showed QTc stable at 475. Has since completed course of abx as below.  * Had been monitoring on telemetry this stay. No concerning findings so tele was ultimately dc'd 8/19.     Anemia, suspect dilutional component, resolved  * Hgb normal on admit. Hgb 11.5 on 7/16. No overt clinical signs of major bleeding.  * Hgb subsequently stable and later normalized.    11/7- I had a 1 hour conference with the patient's daughter, SO and the CC.  We will continue to look for LTC facility.  Continue present care.      Diet: Advance Diet as Tolerated  Regular Diet Adult Thin Liquids (level 0) (Upright position, alert, and assist as needed)  Room Service    DVT Prophylaxis: Enoxaparin (Lovenox) SQ  Carrasquillo Catheter: PRESENT, indication: Insertion Difficulty, Strict 1-2 Hour I&O  Central Lines: PRESENT  PICC 10/31/22 Triple Lumen Right Brachial vein medial PICC line ordered STAT. Line placed w/o complication. Primary RN Updated.-Site Assessment: WDL  Cardiac Monitoring: None  Code Status: No CPR- Do NOT Intubate      Disposition Plan      Expected Discharge Date: 11/07/2022,  3:00 PM  Discharge Delays: Placement - LTC    Discharge Comments: LTC placement        The patient's care was discussed with the Care  "Coordinator/ and Patient's Family.    Donal Rodriguez MD  Hospitalist Service  [facility]  Securely message with the Vocera Web Console (learn more here)  Text page via [employer] Paging/Directory         Clinically Significant Risk Factors        # Hypokalemia: Lowest K = 3.1 mmol/L (Ref range: 3.4-5.3) in last 2 days, will replace as needed       # Hypoalbuminemia: Lowest albumin = 1.3 g/dL (Ref range: 3.5-5.2) at 11/1/2022  4:19 AM, will monitor as appropriate          # Severe Obesity: Estimated body mass index is 47.7 kg/m  as calculated from the following:    Height as of this encounter: 1.676 m (5' 5.98\").    Weight as of this encounter: 134 kg (295 lb 6.7 oz).   # Severe Malnutrition: based on nutrition assessment        ______________________________________________________________________    Interval History       Data reviewed today: I reviewed all medications, new labs and imaging results over the last 24 hours. I personally reviewed no images or EKG's today.    Physical Exam   Vital Signs: Temp: 97.5  F (36.4  C) Temp src: Oral BP: 119/86 Pulse: 95   Resp: 18 SpO2: 98 % O2 Device: None (Room air)    Weight: 295 lbs 6.66 oz  Constitutional: awake, alert, cooperative, no apparent distress  Respiratory: No increased work of breathing, good air exchange, clear to auscultation bilaterally, no crackles or wheezing  Cardiovascular: regular rate and rhythm, normal S1 and S2, no S3 or S4, and no murmur noted  Neuropsychiatric: General: normal, calm and normal eye contact    Data   Recent Labs   Lab 11/07/22  0547 11/07/22  0539 11/06/22  0621 11/05/22  1321 11/05/22  0537 11/04/22  0522 11/01/22  1639 11/01/22  1634 11/01/22  1228 11/01/22  0419   WBC  --   --  7.9  --  5.7 5.8   < >  --   --  12.2*   HGB  --   --  12.7  --  11.7 12.1   < >  --   --  11.5*   MCV  --   --  86  --  83 86   < >  --   --  87   PLT  --   --  282  --  237 233   < >  --   --  197   NA  --   --  " 141  --  139 136   < >  --   --  142   POTASSIUM  --  3.3* 3.1* 3.7 2.8* 3.6   < > 3.8  --  3.2*   CHLORIDE  --   --  109  --  107 108   < >  --   --  114*   CO2  --   --  23  --  25 23   < >  --   --  18*   BUN  --   --  15  --  14 14   < >  --   --  36*   CR  --   --  0.68  --  0.71 0.68   < >  --   --  1.24*   ANIONGAP  --   --  9  --  7 5   < >  --   --  10   ASHIA  --   --  8.0*  --  7.6* 7.7*   < >  --   --  6.1*   GLC 77  --  87  --  124* 137*   < >  --    < > 94   ALBUMIN  --   --   --   --  1.5* 1.4*   < >  --   --  1.3*   PROTTOTAL  --   --   --   --   --   --   --   --   --  3.6*   BILITOTAL  --   --   --   --   --   --   --   --   --  0.5   ALKPHOS  --   --   --   --   --   --   --   --   --  102   ALT  --   --   --   --   --   --   --   --   --  14   AST  --   --   --   --   --   --   --   --   --  30   LIPASE  --   --   --   --   --   --   --  22*  --  32*    < > = values in this interval not displayed.     No results found for this or any previous visit (from the past 24 hour(s)).  Medications     dextrose 5% lactated ringers 10 mL/hr at 11/05/22 0800     - MEDICATION INSTRUCTIONS -       norepinephrine Stopped (11/04/22 0528)       ARIPiprazole  10 mg Oral QPM     aspirin  325 mg Oral Daily     atorvastatin  40 mg Oral QPM     cefTRIAXone  2 g Intravenous Q24H     enoxaparin ANTICOAGULANT  40 mg Subcutaneous Q12H     fludrocortisone  0.1 mg Oral Daily     miconazole   Topical BID     multivitamin w/minerals  1 tablet Oral Daily     [Held by provider] OLANZapine  2.5 mg Oral At Bedtime     polyethylene glycol  17 g Oral Daily     [Held by provider] potassium chloride  20 mEq Oral BID     potassium chloride  40 mEq Oral or Feeding Tube Once     senna-docusate  1-2 tablet Oral BID     sertraline  50 mg Oral Daily     sodium chloride (PF)  10-40 mL Intracatheter Q7 Days     sodium chloride (PF)  3 mL Intracatheter Q8H     sodium phosphate  15 mmol Intravenous Once     [Held by provider] spironolactone   12.5 mg Oral Daily

## 2022-11-07 NOTE — PLAN OF CARE
Pt is A&Ox2-3 Disoriented to time/situation. Cooperative, but anxious at times. VSS RA. On Tele with NS.  Assist X2 with lift. T/R q2hr. Denies nausea. PT C/O moderate pain on both legs, ice pack applied. Denies pain medications. On regular diet; poor appetite. Sips of water with meds, would like pills to be crashed per patient, hard to swallow even when breaks in to half. Incontinent of BB. Carrasquillo in place with adequate output.  No vaginal blood noted.  SKIN: redness of L and R thigh, pink; redness  to LLE and R hip. Awaiting for LTC placement and ID consultant. Will continue to monitor.

## 2022-11-07 NOTE — PROGRESS NOTES
[facility]    Infectious Disease Progress Note    Date of Service : 11/07/2022       Assessment:   77 YF with morbid obesity, HTN who has had prolonged hospitalization since 7/13 initially with CVA requiring thrombolytic therapy and thrombectomy with multiple complications of CHF and infected pressure wounds requiring debridement, who developed cellulitis with sepsis sydreome and hypotension /PRECIOUS reqiring ICU stay.Cxs have remained negative. Lower extremity erythema has improved and sepsis has resolved.     -Bilateral LE erythema and cellulitis has improved. Has chronic edema and some chronic skin discoloration  -PRECIOUS resolved  -Hypotension resolved  -Right flank/back wounds due to pressure injury with infection. S/p surgical debridement with polymicrobial cx-MSSA, proteus mirabilis and E.fecalis. Has jade treated  -Recent R MCA CVA s/p thrombolytic therapy and mechanical thrombectomy on 7/13 with resolution of neurological deficits  -LE lymphedema  -Decubitus ulcers  -Chronic medical conditions - Obesity, diastolic CHF, HTN, venous stasis with stasis dermatitis, bilateral hip replacement     Recommendations  1. Treat with Ceftriaxone until 11/10  2. Lymphedema consult for LE compression    ID will sign off, please call us back as needed    Mere Demarco MD    Interval History   Anxious and complaining of burning in her legs again. Was moved out of the ICU, has remained afebrile and is tolerating antibotics without side effects    Physical Exam   Temp: 97.5  F (36.4  C) Temp src: Oral BP: 119/86 Pulse: 95   Resp: 18 SpO2: 98 % O2 Device: None (Room air)    Vitals:    11/05/22 0400 11/06/22 0601 11/07/22 0545   Weight: 136.7 kg (301 lb 5.9 oz) 134.2 kg (295 lb 13.7 oz) 134 kg (295 lb 6.7 oz)     Vital Signs with Ranges  Temp:  [97.5  F (36.4  C)-98.2  F (36.8  C)] 97.5  F (36.4  C)  Pulse:  [87-95] 95  Resp:  [16-18] 18  BP: (101-119)/(61-86) 119/86  SpO2:  [96 %-98 %] 98 %    Constitutional:  Awake, alert, cooperative  Lungs: Clear to auscultation bilaterally, no crackles or wheezing  Cardiovascular: S1S2  Abdomen: obese and difficult to assess  Skin: Extensive edema both lower extremities  LLE  erythema , seems more chronic now . Some chronic appearing erythema RLE as well     Other:    Medications     dextrose 5% lactated ringers 10 mL/hr at 11/05/22 0800     - MEDICATION INSTRUCTIONS -         ARIPiprazole  10 mg Oral QPM     aspirin  325 mg Oral Daily     atorvastatin  40 mg Oral QPM     cefTRIAXone  2 g Intravenous Q24H     enoxaparin ANTICOAGULANT  40 mg Subcutaneous Q12H     fludrocortisone  0.1 mg Oral Daily     miconazole   Topical BID     multivitamin w/minerals  1 tablet Oral Daily     [Held by provider] OLANZapine  2.5 mg Oral At Bedtime     polyethylene glycol  17 g Oral Daily     [Held by provider] potassium chloride  20 mEq Oral BID     potassium chloride  40 mEq Oral or Feeding Tube Once     senna-docusate  1-2 tablet Oral BID     sertraline  50 mg Oral Daily     sodium chloride (PF)  10-40 mL Intracatheter Q7 Days     sodium chloride (PF)  3 mL Intracatheter Q8H     [Held by provider] spironolactone  12.5 mg Oral Daily       Data   All microbiology laboratory data reviewed.  Recent Labs   Lab Test 11/06/22  0621 11/05/22  0537 11/04/22 0522   WBC 7.9 5.7 5.8   HGB 12.7 11.7 12.1   HCT 38.3 33.5* 36.4   MCV 86 83 86    237 233     Recent Labs   Lab Test 11/06/22  0621 11/05/22 0537 11/04/22 0522   CR 0.68 0.71 0.68     Recent Labs   Lab Test 08/31/18  1649   SED 38*

## 2022-11-07 NOTE — PROGRESS NOTES
CLINICAL NUTRITION SERVICES - REASSESSMENT NOTE      RECOMMENDATIONS FOR MD/PROVIDER TO ORDER:   Max nutrition interventions reached. Pt continues to decline supplements, has declined nutrition support as well.    Recommendations Ordered by Registered Dietitian (RD):   - Continue room service assistance and thera vit M daily  - Encourage intakes    Malnutrition:  (9/21) - ongoing   % Weight Loss:  > 5% in 1 month (severe malnutrition) - significant loss over this admit (some attributed to fluid)   % Intake:  </= 50% for >/= 5 days (severe malnutrition) - consistent this admit x117 days  Subcutaneous Fat Loss:  Orbital region moderate depletion - visual, per 8/16 note  Muscle Loss:  Temporal region moderate depletion - visual, per 8/16 note  Fluid Retention:  Mild generalized edema     Malnutrition Diagnosis: Severe malnutrition  In Context of:  Acute illness or injury  Chronic illness or disease       EVALUATION OF PROGRESS TOWARD GOALS   Diet:  Regular  Room service w/ assist     Intake/Tolerance:  Visited patient, however, is sleeping soundly and does not wake to voice   Chart reviewed, continues to consume ~25% of meals without improvement  Has not been meeting nutrition needs for 117 days while hospitalized        ASSESSED NUTRITION NEEDS:  Dosing Weight: 89.7 kg (adjusted)  Estimated Energy Needs: 3354-9271+ kcals (15-20+ Kcal/Kg)  Justification: maintenance r/t BMI, Wounds  Estimated Protein Needs: 108-135 grams protein (1.2-1.5 g pro/Kg)  Justification: wound healing, obesity guidelines, preservation of lean body mass and increased needs r/t age, Wounds       NEW FINDINGS:   Weight 134 kg   Vitals:    11/03/22 0430 11/04/22 0500 11/05/22 0400 11/06/22 0601   Weight: 138 kg (304 lb 3.8 oz) 137.5 kg (303 lb 2.1 oz) 136.7 kg (301 lb 5.9 oz) 134.2 kg (295 lb 13.7 oz)    11/07/22 0545   Weight: 134 kg (295 lb 6.7 oz)     Ongoing difficult placement   DNR/DNI - no plans for hospice or FT at this time (as discussed  with pt and health care agent)   WOCN following for stage 3 HAPI   Required ICU cares from 10/31-11/5 for pressors d/t refractory hypotension and shock likely overall d/t bilateral lower extremity cellulitis     Previous Goals:   Intake of at least 50% of meals BID.   Evaluation: Not met    Previous Nutrition Diagnosis:   Inadequate oral intake related to poor appetite, disinterest in eating, and increased protein needs for wound healing as evidenced by 0-50% of meals consistently over 107 day admission  Evaluation: No change      CURRENT NUTRITION DIAGNOSIS  Inadequate oral intake related to poor appetite, disinterest in eating, and increased protein needs for wound healing as evidenced by 0-50% of meals consistently over 117 day admission    INTERVENTIONS  Recommendations / Nutrition Prescription  Ongoing PO encouragement as suspect no future changes to GOC or supplement/TF acceptance     Implementation  None     Goals  Intake of at least 50% of meals BID.       MONITORING AND EVALUATION:  Progress towards goals will be monitored and evaluated per protocol and Practice Guidelines      Pauly Castillo RD, LD  Clinical Dietitian

## 2022-11-08 NOTE — PROGRESS NOTES
"SPIRITUAL HEALTH SERVICES Progress Note  [facility] General Surgery    Saw ptnt per follow-up. She expressed \"my ankle hurts,\" then the nurse put ice on it. I helped her drink water and conversed with her. I provided prayer and compassionate presence. I plan to continue to follow-up.     SHS remain available.     DILMA BeckDiv  Chaplain Resident   [phone]   "

## 2022-11-08 NOTE — PLAN OF CARE
A&Ox2-3, disoriented to situation and time, anxious and needs reinforcement. VSS. Generalized pain, oxy x2 given, ice packs placed on legs. BS active, no BM during shift, incAntonio Carrasquillo present w/ adequate UOP. Redness on bilat thighs, hips, and joesph area, bruising on abdomen. Up w/ Ax2 lift, not OOB, turn/repo Q2. Phos and K replacement administered via IV since pt refused to take oral. Regular diet w/ poor intake. LTC placement pending.

## 2022-11-08 NOTE — PROGRESS NOTES
[facility]    Medicine Progress Note - Hospitalist Service    Date of Admission:  7/13/2022    Assessment & Plan   Cristy Bailey is a 77 year old female with a PMHx significant for morbid obesity; HTN; and HLD; who presented to [facility] 7/13/2022 with complete left-sided paralysis, left-sided facial droop, and difficulty speaking. CTA head remarkable for right MCA occlusion. She was given tenecteplase and subsequently transferred to [facility] 7/13/2022 for thrombectomy.   Hospital stay complicated by development and progression of flank wound.  This subsequently required surgical debridement and placement of wound vac (8/2) that was subsequently removed (8/22).   Prolonged hospitalization related to difficulties finding LTC placement.     Septic shock, suspect due to bilateral lower extremity cellulitis, left greater than right, shock resolved  * 10/30: Patient initially developed what appeared like a rash involving the left thigh.  * 10/31: Pt with worsened erythema involving both legs with the left being more intense associated with worsening leukocytosis and elevated CRP. Started on pip-tazo and ID consulted. Developed refractory hypotension which required transfer to the ICU where PICC was placed and started on pressors. Started on stress IV steroids. BC's 10/31 NGTD. UA 10/31 abnormal, but UC growing only >100K mixed urogenital garland.  Cortisol level normal.  * 11/2: CT abdomen and pelvis 11/2 showed no focal findings of infection. CT right thigh 11/2 showed no osteomyelitis; no evidence of a discrete fluid collection to suggest an abscess; cellulitis in the abdominal wall and both proximal calves. Pip-tazo changed to ceftriaxone and clindamycin.   * 11/3: Weaned off pressors.  * 11/4: Clindamycin stopped. Started fludrocortisone.  * 11/5: Cellulitis overall improved.  -- Transfer out of ICU 11/5.  -- Discontinue IV hydrocortisone.  -- Continue fludrocortisone for  now  -- Continue ceftriaxone (started 11/2) through 11/10.      PRECIOUS, suspect prerenal related to septic shock, resolved.  * Cr had been normal this hospitalization.  * Cr increased to 2.08 on 10/31 associated with septic shock which was treated as noted.  * CT abdomen/pelvis 11/2 negative for urinary obstructive process.  * Cr normalized 11/2.      Acute R MCA ischemic stroke with edema and right to left midline shift - s/p tenecteplase and subsequent R MCA mechanical thrombectomy 7/13/22  * Presented to [facility] 7/13 with complete paralysis, left-sided facial droop, and aphasia. Code stroke initiated. Head CT 7/13 showed signs of an evolving R MCA infarct. CTA head 7/13 showed a right carotid terminus occlusion, right middle cerebral artery M1 segment occlusion with poor opacification of the more distal right MCA branches/poor collateral flow. CTA neck 7/13 negative for acute occlusions. Pt given tenecteplase 7/13 at 13:11. Noted to be agitated and was subsequently intubated given need for procedure.   * Subsequently transferred to [facility] 7/13/2022 where she underwent above procedure.   * Extubated 7/14. Head CT 7/14 showed evolution of acute infarct involving the R MCA distribution with increased swelling, cortical effacement, and new mild right-to-left midline shift; questionable hypoattenuation involving the bilateral occipital lobes which could be artifactual.  Echo 7/14 showed LVEF 50%, grade 1 diastolic dysfunction.   * Started ASA and atorvastatin per stroke team.   * Repeat head CT 7/15 showed evolving R MCA stroke with areas of edema, overall stable.  -- Continue  mg daily, atorvastatin 40 mg daily.  -- Goal SBP <140/90.   -- Monitored on telemetry for first several wks of hospital stay without abnl rhythm so will not order 30d cardiac event monitor at discharge per neurology recommendations (from 10/18).  -- Follow up with [facility] in 6-8 wks.     Volume overload secondary to acute diastolic CHF  exacerbation, resolved  H/o essential hypertension  * Not on/needing meds PTA.   * As above, echo this stay showed EF 50% with grade I diastolic dysfunction; RV not well visualized but appeared mild-moderately dilated with global systolic function probably mildly reduced.   * BPs were initially soft this stay and required IVFs. BPs improved.   * Subsequently developed volume overload with elevated BP's and diuresed with IV furosemide, subsequently transitioned to PO furosemide.  * Issues with recurrent hypokalemia on furosemide; noted often refusing potassium.  * Started on lisinopril, but subsequently stopped due to soft BP's on furosemide.  * Furosemide increased and spironolactone added 9/3.  * Subsequently soft BP's and doses of BP meds held intermittently and subsequently developed septic shock 10/31 as noted.  * Started on IV furosemide 11/4.  -- Stop IV furosemide.  -- Continue to hold PO furosemide and spironolactone for now  -- Monitor i/o's, daily wts.     Anxiety  Cognitive impairment with intermittent situational confusion  Metabolic encephalopathy, resolved  * On no psychiatric medications PTA.  *  Pt with issues with anxiety and intermittent situational confusion this hospitalization.    * Seen by Psychiatry this hospitalization.  * Started on multiple medications this hospitalization including quetiapine, olanzapine, sertraline, mirtazapine, PRN hydroxyzine, PRN lorazepam.  * Medication regimen was adjusted/titrated. Mirtazapine, PRN lorazepam and PRN hydroxyzine subsequently stopped. See previous progress notes for more details.  *  PRN ativan stopped on 08/27/22 as leading to increased confusion. Hydroxyzine stopped as well.   -- Continue aripiprazole 10 mg qpm; olanzapine 2.5 mg at bedtime; sertraline 50 mg daily  -- Continue PRN olanzapine 5 mg q6h     Lower back/R flank wound/abscess, suspect dt pressure type injury - s/p surgical debridement on 7/30/2022 and placement of wound vac on 8/2/2022  (subsequently removed 8/22/2022).  OA  Hx of bilateral hip replacements  Hx of chronic neck and back pain  * Patient has chronic back pain, reports having it over the last 2 years. MRI L-spine in 2018 showed multilevel degenerative changes with mild central stenosis.  * Pt with significant back pain early on in hospital stay. Was requiring IV hydromorphone.  Dose had to be decreased dt somnolence.  * During this stay, patient was noted with 5-6cm by 2-3cm wound with swelling/fluid/blistering in a fold of her lower back, did not appear infected; this seemed to be the source of her pain. Padded dressing placed and WOC RN ordered. Pain initially improved.   * Was started on a course of amoxicillin-clavulanate on 7/24.   * On 7/26, was re-evaluated by WOC RN. Wound appeared more erythematous and purulence expressed. Worsening with shear stress from lift. Procal <0.05, WBC WNL. Abx changed to IV clindamycin.  * Wound cultures obtained. On 7/28, wound grew proteus and Staph aureus (MRSA neg). US neg for abscess.   * Lost IV access on 7/28 so abx were changed to oral clindamycin and oral levaquin. IV access re-established but was continued on oral abx.  * General surgery consulted, ultimately underwent excisional debridement of R flank abscess in OR on 7/30. Intraop cultures showed polymicrobial growth with proteus mirabilis x2 strains (both pan-sensitive), corynebacterium striatum and enterococcus faecalis.  * Wound vac placed per general surgery on 8/2.  * ID consulted on 8/2 and abx narrowed to amoxicillin-clavulanate alone, completed an additional 5 days of treatment on 8/7.   * Wound vac removed 8/22.  * CT right thigh 11/2 negative for osteomyelitis or deep infection (checked for septic shock) as above.  -- Continue local wound cares per WOC RN, follows weekly  -- Continue PRN oxycodone; minimize opioids as able.  -- Continue regular repositioning  -- Management of other pressure injuries as below     Severe  malnutrition related to acute and chronic medical issues  Significant weight loss this hospitalization  * Nutritionist following. Appetite poor this stay, needing encouragement to take po.   * There was some discussion previously about needing feeding support, but noted that patient does not want to have a feeding tube.  * As of 10/19, has had 107 lb weight loss since admit (374 lbs on 7/14; 267 lbs 10/19); noted some contribution from diuresis for CHF.  -- Continue to encourage oral intake  -- Continue supplements  -- Patient does not want a feeding tube and her health care agent Leo agrees with this, consistent with previous wishes     Vaginal bleeding  Abnormal endometrial thickening on US  * Pt with vaginal bleeding intermittently this hospitalization.  * Pelvic US 10/8 showed abnormal endometrial thickening to 14 mm.   * Gyn consulted 10/9 and recommend outpatient evaluation.  * Hgb overall stable.  -- Follow-up with Gyn outpatient for endometrial sampling in the near future     Deep tissue pressure injury (DTPI) left medial thigh/groin region, hospital acquired, progressed to HAPI stage 2 pressure injury; related to purwick external catheter tubing (not the purwick device itself)  Pressure injuries, bilateral buttocks  * DTPI left medial thigh/groin region noted this hospitalization. WOC RN consulted.   * Pressure injuries in bilateral buttocks noted 9/2. Seen by WOC RN.  -- Continue local wound cares per WOC RN  -- Continue regular repositioning     Dyslipidemia  * FLP this stay showed tot cholest 163, HDL 47, LDL 91, .   * Started on atorvastatin 40 mg daily  -- Continue atorvastatin 40 mg daily     Chronic bilateral LE edema with chronic venous stasis dermatitis and chronic skin changes  Hx of LLE cellulitis  * Was hospitalized in 11/2021 for sepsis dt to pneumonia and LLE cellulitis.   * During this stay, BLE noted to be patty and edematous, no unilateral leg swelling appreciated; LLE had patchy  areas of erythema, no fluctuance, no painful areas with palpation; legs warm to touch. Lymphedema consulted.   -- Cont LE elevation, lymphedema wraps  -- Continue diuretic (as above)     Hypokalemia, hypophosphatemia.  * Potassium and phosphorus low at times this hospitalization. Treated with replacement.            Recent Labs   Lab 11/05/22  0537 11/04/22  0522 11/03/22  1318 11/03/22  0429 11/02/22  1520 11/02/22  0416 11/01/22  1634   POTASSIUM 2.8* 3.6 3.5 3.1*  --  3.6 3.8   MAG 1.9 2.2  --  2.4*  --  2.7* 1.4*   PHOS 2.8 2.3*  --  2.6 5.9* 1.8*  --    - Continue PRN K and phos replacement.     Intertriginous dermatitis  -- Continue clotrimazole powder     Constipation  -- Continue scheduled senna-docusate, polyethylene glycol, PRN bisacodyl     Suspected meningioma left parietal region, incidentally seen on admission CT on 7/13/2022  * Head CT 7/13 showed a calcified extra-axial mass overlying the left parietal region measuring 1.4 cm, potentially representing a meningioma.  -- Will need serial monitoring OP after discharge     Suspected sleep apnea  * O2 drop to mid 80s overnight 8/14. Briefly placed on 4LPM, which she then removed and then slept okay with sats in 90s thereafter.  -- Consider sleep study as outpatient     Morbid obesity with significant weight loss this hospitalization  * BMI 59 on admission.  * Significant weight loss this hospitalization as above (107 lbs as of 10/19). BMI 43.2 on 10/20.  -- Continue aggressive dietary and lifestyle modifications      Goals of care  Failure to thrive  * During this hospitalization, discussed concern about overall prognosis, with her eating 0-25% of her meals and nutrition recommendations for alternative forms of nutrition; at that time, she had lost 70 lbs since admit (some of that was probably related to diuresis and some related to variable weights in hospital, but still significant).   * Patient and significant other/HCA Leo, reiterated her wishes for  DNR/DNI and no feeding tubes.  * Palliative care met with Leo 9/28 and he did not want hospice.  -- Continue restorative cares  -- Pt is DNR/DNI, no tube feedings     OTHER RESOLVED/CHRONIC ISSUES:  Urinary retention, resolved  * Retaining urine on 8/14. UA WNL, not worrisome for infection.  * Requiring straight cath x1 on 8/20 PM.  * Subsequently good UOP, no longer requiring straight cath. No evidence of obstruction on PVR.      Dysphagia due to CVA, resolved  * Seen by SLP and noted with dysphagia. Initially required some intermittent fluid boluses.   * Was eventually able to advance to regular diet w/thin liquids.     Prolonged QTc.  * EKG on 7/13 showed QTc 494.   * Repeat EKG on 7/30 (while on levofloxacin) showed QTc stable at 475. Has since completed course of abx as below.  * Had been monitoring on telemetry this stay. No concerning findings so tele was ultimately dc'd 8/19.     Anemia, suspect dilutional component, resolved  * Hgb normal on admit. Hgb 11.5 on 7/16. No overt clinical signs of major bleeding.  * Hgb subsequently stable and later normalized.    11/8- stable. No new issues.  Completing course of ceftriaxone for bilateral leg cellulitis. Should stop fludrocortisone soon.       Diet: Advance Diet as Tolerated  Regular Diet Adult Thin Liquids (level 0) (Upright position, alert, and assist as needed)  Room Service    DVT Prophylaxis: Enoxaparin (Lovenox) SQ  Carrasquillo Catheter: PRESENT, indication: Other (Comment) (cva, incontinence, and potential skin breakdown), Strict 1-2 Hour I&O  Central Lines: PRESENT  PICC 10/31/22 Triple Lumen Right Brachial vein medial PICC line ordered STAT. Line placed w/o complication. Primary RN Updated.-Site Assessment: WDL  Cardiac Monitoring: None  Code Status: No CPR- Do NOT Intubate      Disposition Plan         The patient's care was discussed with the patiient.  Donal Rodriguez MD  Hospitalist Service  [facility]  Securely message  "with the [employer] Web Console (learn more here)  Text page via [employer] Paging/Directory         Clinically Significant Risk Factors        # Hypokalemia: Lowest K = 3.3 mmol/L (Ref range: 3.4-5.3) in last 2 days, will replace as needed       # Hypoalbuminemia: Lowest albumin = 1.3 g/dL (Ref range: 3.5-5.2) at 11/1/2022  4:19 AM, will monitor as appropriate          # Severe Obesity: Estimated body mass index is 47.7 kg/m  as calculated from the following:    Height as of this encounter: 1.676 m (5' 5.98\").    Weight as of this encounter: 134 kg (295 lb 6.7 oz).   # Severe Malnutrition: based on nutrition assessment        ______________________________________________________________________    Interval History       Data reviewed today: I reviewed all medications, new labs and imaging results over the last 24 hours. I personally reviewed no images or EKG's today.    Physical Exam   Vital Signs: Temp: 98.6  F (37  C) Temp src: Axillary BP: 122/68 Pulse: 89   Resp: 18 SpO2: 94 % O2 Device: None (Room air)    Weight: 295 lbs 6.66 oz  Constitutional: awake, alert, cooperative, no apparent distress  Respiratory: No increased work of breathing, good air exchange, clear to auscultation bilaterally, no crackles or wheezing  Cardiovascular: regular rate and rhythm, normal S1 and S2, no S3 or S4, and no murmur noted  Neuropsychiatric: General: normal, calm and normal eye contact  Skin: legs are still erythematous.      Data   Recent Labs   Lab 11/07/22  0547 11/07/22  0539 11/06/22  0621 11/05/22  1321 11/05/22  0537 11/04/22  0522 11/01/22  1639 11/01/22  1634   WBC  --   --  7.9  --  5.7 5.8   < >  --    HGB  --   --  12.7  --  11.7 12.1   < >  --    MCV  --   --  86  --  83 86   < >  --    PLT  --   --  282  --  237 233   < >  --    NA  --   --  141  --  139 136   < >  --    POTASSIUM  --  3.3* 3.1* 3.7 2.8* 3.6   < > 3.8   CHLORIDE  --   --  109  --  107 108   < >  --    CO2  --   --  23  --  25 23   < >  --    BUN  --   --  " 15  --  14 14   < >  --    CR  --   --  0.68  --  0.71 0.68   < >  --    ANIONGAP  --   --  9  --  7 5   < >  --    ASHIA  --   --  8.0*  --  7.6* 7.7*   < >  --    GLC 77  --  87  --  124* 137*   < >  --    ALBUMIN  --   --   --   --  1.5* 1.4*   < >  --    LIPASE  --   --   --   --   --   --   --  22*    < > = values in this interval not displayed.     No results found for this or any previous visit (from the past 24 hour(s)).  Medications     dextrose 5% lactated ringers 10 mL/hr at 11/05/22 0800     - MEDICATION INSTRUCTIONS -         ARIPiprazole  10 mg Oral QPM     aspirin  325 mg Oral Daily     atorvastatin  40 mg Oral QPM     cefTRIAXone  2 g Intravenous Q24H     enoxaparin ANTICOAGULANT  40 mg Subcutaneous Q12H     fludrocortisone  0.1 mg Oral Daily     miconazole   Topical BID     multivitamin w/minerals  1 tablet Oral Daily     [Held by provider] OLANZapine  2.5 mg Oral At Bedtime     polyethylene glycol  17 g Oral Daily     [Held by provider] potassium chloride  20 mEq Oral BID     potassium chloride  40 mEq Oral or Feeding Tube Once     senna-docusate  1-2 tablet Oral BID     sertraline  50 mg Oral Daily     sodium chloride (PF)  10-40 mL Intracatheter Q7 Days     sodium chloride (PF)  3 mL Intracatheter Q8H     [Held by provider] spironolactone  12.5 mg Oral Daily

## 2022-11-08 NOTE — PLAN OF CARE
Goal Outcome Evaluation:      Plan of Care Reviewed With: patient    Overall Patient Progress: no changeOverall Patient Progress: no change     DATE & TIME: 11/7/2022 6143-8284  Cognitive Concerns/ Orientation : Alert & oriented x 2. Disoriented to situation and time.   ABNL VS/O2: VSS on RA.  MOBILITY: Total care/ turn and repoq2  PAIN MANAGMENT: Ice pack and warm packs to lower extremities.   DIET: Regular -Needs encouragement.   BOWEL/BLADDER: Carrasquillo in place, low output this shift, though patent and draining. Incontinent of BM, but none noted this shift.  ABNL LAB/BG: Phosphorous lab draw done this AM.  DRAIN/DEVICES: PICC in placed, Carrasquillo in placed.   TELEMETRY RHYTHM: NS   SKIN: Bruised scattered, Blanchable redness, excoriation to abdomen & right flank. Generalized edema.   D/C DATE: Pending placement.   OTHER IMPORTANT INFO: WOC following.

## 2022-11-08 NOTE — PROGRESS NOTES
Care Management Follow Up    Length of Stay (days): 118    Expected Discharge Date: 11/07/2022     Concerns to be Addressed:       Patient plan of care discussed at interdisciplinary rounds: Yes    Anticipated Discharge Disposition: Skilled Nursing Facility     Anticipated Discharge Services:    Anticipated Discharge DME:      Patient/family educated on Medicare website which has current facility and service quality ratings:    Education Provided on the Discharge Plan:    Patient/Family in Agreement with the Plan: yes    Referrals Placed by CM/SW:    Private pay costs discussed: Not applicable    Additional Information:  SW resent 16 referrals for LTC placement.     [facility] is not taking admits until december      JOSE GUADLAUPE Medina

## 2022-11-08 NOTE — PROGRESS NOTES
DATE & TIME: 11/7/2022 8161-3806   Cognitive Concerns/ Orientation : Alert & oriented x 2. Disoriented to situation and time.   ABNL VS/O2: VSS on RA.  MOBILITY: Total care/ turn and repo  PAIN MANAGMENT: Ice pack and warm packs to lower extremities.   DIET: Regular -Needs encouragement.   BOWEL/BLADDER: Carrasquillo in place. Incontinent of BM.   ABNL LAB/BG: Phos 2.0, replaced, AM recheck. Potassium 3.3, patient refused replacement. MD notified.   DRAIN/DEVICES: PICC in placed, Carrasquillo in placed.   TELEMETRY RHYTHM: NS   SKIN: Bruised scattered, Blanchable redness, excoriation to abdomen & right flank. Generalized edema.   D/C DATE: Pending placement.   OTHER IMPORTANT INFO: WOC following.

## 2022-11-09 NOTE — PLAN OF CARE
A&Ox2-3, disoriented to situation and time, anxious and needs reinforcement. VSS. Generalized pain, ice packs placed on legs, edema wear present. BS active, no BM during shift, inc. Carrasquillo present w/ low MD CHRISTIAN placed orders to restart fluids. Redness on bilat thighs, hips, and joesph area, bruising on abdomen. WOC cares on R posterior hip completed. Up w/ Ax2 lift, not OOB, turn/repo Q2. Phos replacement administered via IV since pt refused to take oral. Regular diet w/ poor intake. LTC placement pending.

## 2022-11-09 NOTE — PLAN OF CARE
DATE & TIME: 11/8/2022 5146-0706  Cognitive Concerns/ Orientation : Alert & oriented x 2. Disoriented to time and situation.   ABNL VS/O2: VSS on RA.  MOBILITY: Total care/ turn and repoq2  PAIN MANAGMENT: Ice pack and elevation  DIET: Regular -Poor appetite, needs encouragement.   BOWEL/BLADDER: Carrasquillo- minimal output; vaginal bloody discharge. Incont bowel- no BM this shift.   ABNL LAB/BG: K rechecked-pending results. Phos recheck for the morning   DRAIN/DEVICES: PICC-SL, PIV-SL X3 Carrasquillo in placed.   TELEMETRY RHYTHM: NS   SKIN: Bruised scattered, excoriation to abdomen-JH & right flank-CDI.  Mepilex on sacrum. Rash periarea and pannus. Generalized edema. Cellulitis BLE  D/C DATE: Pending placement.   OTHER IMPORTANT INFO: Refused some medications tonight. Anxious at times. Needs lots of positive reinforcement. WOC following. Awaiting LTC placement- SW following.

## 2022-11-09 NOTE — PROGRESS NOTES
Care Management Follow Up    Length of Stay (days): 119    Expected Discharge Date: 11/10/2022     Concerns to be Addressed:       Patient plan of care discussed at interdisciplinary rounds: Yes    Anticipated Discharge Disposition: Skilled Nursing Facility     Anticipated Discharge Services:  Long term care  Anticipated Discharge DME:  None    Patient/family educated on Medicare website which has current facility and service quality ratings:    Education Provided on the Discharge Plan:  yes  Patient/Family in Agreement with the Plan: yes    Referrals Placed by CM/SW:  Long term care facilities  Private pay costs discussed: Not applicable    Additional Information:  Writer received a message from [employer] asking for a return call. Writer left a message for [employer] and asked for a return call.    Will continue to follow.    JOSE GUADALUPE Ruffin

## 2022-11-09 NOTE — PLAN OF CARE
Lymphedema: New Lymphedema orders received. Pt had tried edema wraps in the past during this hospitalization. Pt did not tolerate wraps and was transitioned into edema wear, which was handed off to nursing team. Today, placed new edema wear on pt's BLE by therapist, hand off to nursing staff if pt can tolerate.  Will complete lymphedema order at this time.     Physical Therapy: Pt declined any OOB activity and per chart and care plan note 10/20, it was discussed with family, LTC placement is needed. Physical Therapy not indicated due to previous long term trial (90 days) of physical therapy without ability to progress mobility. Pt requires use of lift equipment for all transfers and needs are nursing based. Please refer to physical therapy care plan note from 10/20/22 and 11/2. Deferring further physical therapy in the acute setting.? Defer discharge recommendations to medical team. Will complete PT order.

## 2022-11-09 NOTE — PROGRESS NOTES
Assumed care 2300  Pt slept well during the night   A&0 x2  Voiding via snyder   Denies pain   Potassium replaced (see mar)

## 2022-11-09 NOTE — PROVIDER NOTIFICATION
MD Notification    Notified Person: MD    Notified Person Name:Dr. Rodriguez    Notification Date/Time: 11-9 1200    Notification Interaction: [employer] messaging    Purpose of Notification:     Pt continues to have low UOP (150 last night) and about 100 today. Do we want to resume fluids?         Orders Received: D5 in LR @ 100ml/hr    Comments:

## 2022-11-09 NOTE — PROGRESS NOTES
[facility] Nurse Inpatient Assessment     Consulted for: Right lower back wound (Stage 3 HAPI)     Areas Assessed:      Areas visualized during today's visit: right skin fold, left medial thigh      Wound location: Right lower back in waist crease    Photo date: 11-9-22 11-2-22      Wound due to: Hospital Acquired Pressure injury stage 3   Stage: Unstageable 7/28, Following surgical debridement 7/30 Stage 3 Pressure Injury and Moisture Associated Skin Damage (MASD), suspect intertriginous pressure, appears to be deeper tissue damage within a crease, possible shear component from lift.    Wound history/plan of care: previously a large deep wound, vac therapy was used for a few weeks and wound filled in well. Previous hypertrophic tissue has receded.  Will continue current POC with Melina.  Wound base: pink-red moist tissue     Palpation of the wound bed: normal       Drainage: small      Description of drainage: serosanguinous       Measurements (length x width x depth, in cm) 1 x 9 x 0.2cm with small skin bridge in middle     Tunneling N/A      Undermining NA  Periwound skin:  Previous rash improved, some maceration and peeling 11/9      Color: pink blanchable erythema      Temperature: normal to warm and sweaty  Odor: none    Pain: tender   Pain intervention: slow and gentle cares  Treatment goal: Heal   STATUS: improving slowly  Supplies ordered: at bedside     Treatment Plan:     Right posterior waist crease: Daily  1. Cleanse with wound cleanser or Vashe ( #[id number]).  2. Apply 3M No Sting barrier to periwound skin, let dry.  3. Apply Melina (# [id number]) to wound bed (cut pieces to fit).  (This is a silver collagen dressing that may partially dissolve into wound).   4. Cover with Mepilex, conforming carefully to skin contours.        Orders: Reviewed    RECOMMEND PRIMARY TEAM ORDER: None, at this time  Education provided: importance of repositioning, plan of care, wound  progress, Moisture management and Off-loading pressure  Discussed plan of care with: Patient and Nurse  WOC nurse follow-up plan: weekly  Notify WOC if wound(s) deteriorate.  Nursing to notify the Provider(s) and re-consult the WOC Nurse if new skin concern.    DATA:     Current support surface: Bariatric Low air loss mattress    Containment of urine/stool: Incontinence Protocol and Incontinent pad in bed  BMI: Body mass index is 47.7 kg/m .   Active diet order: Orders Placed This Encounter      Advance Diet as Tolerated      Regular Diet Adult Thin Liquids (level 0) (Upright position, alert, and assist as needed)     Output: I/O last 3 completed shifts:  In: 200 [P.O.:200]  Out: 425 [Urine:425]     Labs:   Recent Labs   Lab 11/06/22  0621 11/05/22  0537   ALBUMIN  --  1.5*   HGB 12.7 11.7   WBC 7.9 5.7     Pressure injury risk assessment:   Sensory Perception: 3-->slightly limited  Moisture: 3-->occasionally moist  Activity: 1-->bedfast  Mobility: 2-->very limited  Nutrition: 2-->probably inadequate  Friction and Shear: 1-->problem  Demetrio Score: 12    Talia Linda RN CWN   Dept. Pager: [phone]  Dept. Office Number: [phone]

## 2022-11-09 NOTE — PROGRESS NOTES
[facility]    Medicine Progress Note - Hospitalist Service    Date of Admission:  7/13/2022    Assessment & Plan   Cristy Bailey is a 77 year old female with a PMHx significant for morbid obesity; HTN; and HLD; who presented to [facility] 7/13/2022 with complete left-sided paralysis, left-sided facial droop, and difficulty speaking. CTA head remarkable for right MCA occlusion. She was given tenecteplase and subsequently transferred to [facility] 7/13/2022 for thrombectomy.   Hospital stay complicated by development and progression of flank wound.  This subsequently required surgical debridement and placement of wound vac (8/2) that was subsequently removed (8/22).   Prolonged hospitalization related to difficulties finding LTC placement.     Septic shock, suspect due to bilateral lower extremity cellulitis, left greater than right, shock resolved  * 10/30: Patient initially developed what appeared like a rash involving the left thigh.  * 10/31: Pt with worsened erythema involving both legs with the left being more intense associated with worsening leukocytosis and elevated CRP. Started on pip-tazo and ID consulted. Developed refractory hypotension which required transfer to the ICU where PICC was placed and started on pressors. Started on stress IV steroids. BC's 10/31 NGTD. UA 10/31 abnormal, but UC growing only >100K mixed urogenital garland.  Cortisol level normal.  * 11/2: CT abdomen and pelvis 11/2 showed no focal findings of infection. CT right thigh 11/2 showed no osteomyelitis; no evidence of a discrete fluid collection to suggest an abscess; cellulitis in the abdominal wall and both proximal calves. Pip-tazo changed to ceftriaxone and clindamycin.   * 11/3: Weaned off pressors.  * 11/4: Clindamycin stopped. Started fludrocortisone.  * 11/5: Cellulitis overall improved.  -- Transfer out of ICU 11/5.  -- Discontinue IV hydrocortisone.  -- Continue fludrocortisone for 1  more day   -- Continue ceftriaxone (started 11/2) through 11/10.      PRECIOUS, suspect prerenal related to septic shock, resolved.  * Cr had been normal this hospitalization.  * Cr increased to 2.08 on 10/31 associated with septic shock which was treated as noted.  * CT abdomen/pelvis 11/2 negative for urinary obstructive process.  * Cr normalized 11/2.      Acute R MCA ischemic stroke with edema and right to left midline shift - s/p tenecteplase and subsequent R MCA mechanical thrombectomy 7/13/22  * Presented to [facility] 7/13 with complete paralysis, left-sided facial droop, and aphasia. Code stroke initiated. Head CT 7/13 showed signs of an evolving R MCA infarct. CTA head 7/13 showed a right carotid terminus occlusion, right middle cerebral artery M1 segment occlusion with poor opacification of the more distal right MCA branches/poor collateral flow. CTA neck 7/13 negative for acute occlusions. Pt given tenecteplase 7/13 at 13:11. Noted to be agitated and was subsequently intubated given need for procedure.   * Subsequently transferred to [facility] 7/13/2022 where she underwent above procedure.   * Extubated 7/14. Head CT 7/14 showed evolution of acute infarct involving the R MCA distribution with increased swelling, cortical effacement, and new mild right-to-left midline shift; questionable hypoattenuation involving the bilateral occipital lobes which could be artifactual.  Echo 7/14 showed LVEF 50%, grade 1 diastolic dysfunction.   * Started ASA and atorvastatin per stroke team.   * Repeat head CT 7/15 showed evolving R MCA stroke with areas of edema, overall stable.  -- Continue  mg daily, atorvastatin 40 mg daily.  -- Goal SBP <140/90.   -- Monitored on telemetry for first several wks of hospital stay without abnl rhythm so will not order 30d cardiac event monitor at discharge per neurology recommendations (from 10/18).  -- Follow up with [facility] in 6-8 wks.     Volume overload secondary to acute diastolic  CHF exacerbation, resolved  H/o essential hypertension  * Not on/needing meds PTA.   * As above, echo this stay showed EF 50% with grade I diastolic dysfunction; RV not well visualized but appeared mild-moderately dilated with global systolic function probably mildly reduced.   * BPs were initially soft this stay and required IVFs. BPs improved.   * Subsequently developed volume overload with elevated BP's and diuresed with IV furosemide, subsequently transitioned to PO furosemide.  * Issues with recurrent hypokalemia on furosemide; noted often refusing potassium.  * Started on lisinopril, but subsequently stopped due to soft BP's on furosemide.  * Furosemide increased and spironolactone added 9/3.  * Subsequently soft BP's and doses of BP meds held intermittently and subsequently developed septic shock 10/31 as noted.  * Started on IV furosemide 11/4.  -- Stop IV furosemide.  -- Continue to hold PO furosemide and spironolactone for now  -- Monitor i/o's, daily wts.     Anxiety  Cognitive impairment with intermittent situational confusion  Metabolic encephalopathy, resolved  * On no psychiatric medications PTA.  *  Pt with issues with anxiety and intermittent situational confusion this hospitalization.    * Seen by Psychiatry this hospitalization.  * Started on multiple medications this hospitalization including quetiapine, olanzapine, sertraline, mirtazapine, PRN hydroxyzine, PRN lorazepam.  * Medication regimen was adjusted/titrated. Mirtazapine, PRN lorazepam and PRN hydroxyzine subsequently stopped. See previous progress notes for more details.  *  PRN ativan stopped on 08/27/22 as leading to increased confusion. Hydroxyzine stopped as well.   -- Continue aripiprazole 10 mg qpm; olanzapine 2.5 mg at bedtime; sertraline 50 mg daily  -- Continue PRN olanzapine 5 mg q6h     Lower back/R flank wound/abscess, suspect dt pressure type injury - s/p surgical debridement on 7/30/2022 and placement of wound vac on  8/2/2022 (subsequently removed 8/22/2022).  OA  Hx of bilateral hip replacements  Hx of chronic neck and back pain  * Patient has chronic back pain, reports having it over the last 2 years. MRI L-spine in 2018 showed multilevel degenerative changes with mild central stenosis.  * Pt with significant back pain early on in hospital stay. Was requiring IV hydromorphone.  Dose had to be decreased dt somnolence.  * During this stay, patient was noted with 5-6cm by 2-3cm wound with swelling/fluid/blistering in a fold of her lower back, did not appear infected; this seemed to be the source of her pain. Padded dressing placed and WOC RN ordered. Pain initially improved.   * Was started on a course of amoxicillin-clavulanate on 7/24.   * On 7/26, was re-evaluated by WOC RN. Wound appeared more erythematous and purulence expressed. Worsening with shear stress from lift. Procal <0.05, WBC WNL. Abx changed to IV clindamycin.  * Wound cultures obtained. On 7/28, wound grew proteus and Staph aureus (MRSA neg). US neg for abscess.   * Lost IV access on 7/28 so abx were changed to oral clindamycin and oral levaquin. IV access re-established but was continued on oral abx.  * General surgery consulted, ultimately underwent excisional debridement of R flank abscess in OR on 7/30. Intraop cultures showed polymicrobial growth with proteus mirabilis x2 strains (both pan-sensitive), corynebacterium striatum and enterococcus faecalis.  * Wound vac placed per general surgery on 8/2.  * ID consulted on 8/2 and abx narrowed to amoxicillin-clavulanate alone, completed an additional 5 days of treatment on 8/7.   * Wound vac removed 8/22.  * CT right thigh 11/2 negative for osteomyelitis or deep infection (checked for septic shock) as above.  -- Continue local wound cares per WOC RN, follows weekly  -- Continue PRN oxycodone; minimize opioids as able.  -- Continue regular repositioning  -- Management of other pressure injuries as below     Severe  malnutrition related to acute and chronic medical issues  Significant weight loss this hospitalization  * Nutritionist following. Appetite poor this stay, needing encouragement to take po.   * There was some discussion previously about needing feeding support, but noted that patient does not want to have a feeding tube.  * As of 10/19, has had 107 lb weight loss since admit (374 lbs on 7/14; 267 lbs 10/19); noted some contribution from diuresis for CHF.  -- Continue to encourage oral intake  -- Continue supplements  -- Patient does not want a feeding tube and her health care agent Leo agrees with this, consistent with previous wishes     Vaginal bleeding  Abnormal endometrial thickening on US  * Pt with vaginal bleeding intermittently this hospitalization.  * Pelvic US 10/8 showed abnormal endometrial thickening to 14 mm.   * Gyn consulted 10/9 and recommend outpatient evaluation.  * Hgb overall stable.  -- Follow-up with Gyn outpatient for endometrial sampling in the near future     Deep tissue pressure injury (DTPI) left medial thigh/groin region, hospital acquired, progressed to HAPI stage 2 pressure injury; related to purwick external catheter tubing (not the purwick device itself)  Pressure injuries, bilateral buttocks  * DTPI left medial thigh/groin region noted this hospitalization. WOC RN consulted.   * Pressure injuries in bilateral buttocks noted 9/2. Seen by WOC RN.  -- Continue local wound cares per WOC RN  -- Continue regular repositioning     Dyslipidemia  * FLP this stay showed tot cholest 163, HDL 47, LDL 91, .   * Started on atorvastatin 40 mg daily  -- Continue atorvastatin 40 mg daily     Chronic bilateral LE edema with chronic venous stasis dermatitis and chronic skin changes  Hx of LLE cellulitis  * Was hospitalized in 11/2021 for sepsis dt to pneumonia and LLE cellulitis.   * During this stay, BLE noted to be patty and edematous, no unilateral leg swelling appreciated; LLE had patchy  areas of erythema, no fluctuance, no painful areas with palpation; legs warm to touch. Lymphedema consulted.   -- Cont LE elevation, lymphedema wraps  -- Continue diuretic (as above)     Hypokalemia, hypophosphatemia.  * Potassium and phosphorus low at times this hospitalization. Treated with replacement.            Recent Labs   Lab 11/05/22  0537 11/04/22  0522 11/03/22  1318 11/03/22  0429 11/02/22  1520 11/02/22  0416 11/01/22  1634   POTASSIUM 2.8* 3.6 3.5 3.1*  --  3.6 3.8   MAG 1.9 2.2  --  2.4*  --  2.7* 1.4*   PHOS 2.8 2.3*  --  2.6 5.9* 1.8*  --    - Continue PRN K and phos replacement.     Intertriginous dermatitis  -- Continue clotrimazole powder     Constipation  -- Continue scheduled senna-docusate, polyethylene glycol, PRN bisacodyl     Suspected meningioma left parietal region, incidentally seen on admission CT on 7/13/2022  * Head CT 7/13 showed a calcified extra-axial mass overlying the left parietal region measuring 1.4 cm, potentially representing a meningioma.  -- Will need serial monitoring OP after discharge     Suspected sleep apnea  * O2 drop to mid 80s overnight 8/14. Briefly placed on 4LPM, which she then removed and then slept okay with sats in 90s thereafter.  -- Consider sleep study as outpatient     Morbid obesity with significant weight loss this hospitalization  * BMI 59 on admission.  * Significant weight loss this hospitalization as above (107 lbs as of 10/19). BMI 43.2 on 10/20.  -- Continue aggressive dietary and lifestyle modifications      Goals of care  Failure to thrive  * During this hospitalization, discussed concern about overall prognosis, with her eating 0-25% of her meals and nutrition recommendations for alternative forms of nutrition; at that time, she had lost 70 lbs since admit (some of that was probably related to diuresis and some related to variable weights in hospital, but still significant).   * Patient and significant other/HCA Leo, reiterated her wishes for  DNR/DNI and no feeding tubes.  * Palliative care met with Leo 9/28 and he did not want hospice.  -- Continue restorative cares  -- Pt is DNR/DNI, no tube feedings     OTHER RESOLVED/CHRONIC ISSUES:  Urinary retention, resolved  * Retaining urine on 8/14. UA WNL, not worrisome for infection.  * Requiring straight cath x1 on 8/20 PM.  * Subsequently good UOP, no longer requiring straight cath. No evidence of obstruction on PVR.      Dysphagia due to CVA, resolved  * Seen by SLP and noted with dysphagia. Initially required some intermittent fluid boluses.   * Was eventually able to advance to regular diet w/thin liquids.     Prolonged QTc.  * EKG on 7/13 showed QTc 494.   * Repeat EKG on 7/30 (while on levofloxacin) showed QTc stable at 475. Has since completed course of abx as below.  * Had been monitoring on telemetry this stay. No concerning findings so tele was ultimately dc'd 8/19.     Anemia, suspect dilutional component, resolved  * Hgb normal on admit. Hgb 11.5 on 7/16. No overt clinical signs of major bleeding.  * Hgb subsequently stable and later normalized.    11/9- still has a poor appetite and low urine output.  Will resume intravenous fluid. Replace phosphorus. Continue other care including ceftriaxone.      Diet: Advance Diet as Tolerated  Regular Diet Adult Thin Liquids (level 0) (Upright position, alert, and assist as needed)  Room Service    DVT Prophylaxis: Enoxaparin (Lovenox) SQ  Carrasquillo Catheter: PRESENT, indication: Strict 1-2 Hour I&O, Strict 1-2 Hour I&O  Central Lines: PRESENT  PICC 10/31/22 Triple Lumen Right Brachial vein medial PICC line ordered STAT. Line placed w/o complication. Primary RN Updated.-Site Assessment: WDL  Cardiac Monitoring: None  Code Status: No CPR- Do NOT Intubate      Disposition Plan      Expected Discharge Date: 11/09/2022,  3:00 PM  Discharge Delays: Placement - LTC    Discharge Comments: LTC placement  IV abx until 11/10      The patient's care was discussed with  "the patiient.  Donal Rodriguez MD  Hospitalist Service  [facility]  Securely message with the [employer] Web Console (learn more here)  Text page via [employer] Paging/Directory         Clinically Significant Risk Factors        # Hypokalemia: Lowest K = 3.1 mmol/L (Ref range: 3.4-5.3) in last 2 days, will replace as needed       # Hypoalbuminemia: Lowest albumin = 1.3 g/dL (Ref range: 3.5-5.2) at 11/1/2022  4:19 AM, will monitor as appropriate          # Severe Obesity: Estimated body mass index is 47.7 kg/m  as calculated from the following:    Height as of this encounter: 1.676 m (5' 5.98\").    Weight as of this encounter: 134 kg (295 lb 6.7 oz).   # Severe Malnutrition: based on nutrition assessment        ______________________________________________________________________    Interval History       Data reviewed today: I reviewed all medications, new labs and imaging results over the last 24 hours. I personally reviewed no images or EKG's today.    Physical Exam   Vital Signs: Temp: 97.6  F (36.4  C) Temp src: Oral BP: 128/66 Pulse: 83   Resp: 18 SpO2: 95 % O2 Device: None (Room air)    Weight: 295 lbs 6.66 oz  Constitutional: awake, alert, cooperative, no apparent distress  Respiratory: No increased work of breathing, good air exchange, clear to auscultation bilaterally, no crackles or wheezing  Cardiovascular: regular rate and rhythm, normal S1 and S2, no S3 or S4, and no murmur noted  Neuropsychiatric: General: normal, calm and normal eye contact  Skin: legs are still erythematous.  About the same as yesterday.    Data   Recent Labs   Lab 11/09/22  0651 11/08/22  2245 11/08/22  0641 11/07/22  0547 11/07/22  0539 11/06/22  0621 11/05/22  1321 11/05/22 0537 11/04/22 0522   WBC  --   --   --   --   --  7.9  --  5.7 5.8   HGB  --   --   --   --   --  12.7  --  11.7 12.1   MCV  --   --   --   --   --  86  --  83 86   PLT  --   --   --   --   --  282  --  237 233   NA  --   --   --   -- "   --  141  --  139 136   POTASSIUM 4.0 3.3* 3.1*  --    < > 3.1*   < > 2.8* 3.6   CHLORIDE  --   --   --   --   --  109  --  107 108   CO2  --   --   --   --   --  23  --  25 23   BUN  --   --   --   --   --  15  --  14 14   CR  --   --   --   --   --  0.68  --  0.71 0.68   ANIONGAP  --   --   --   --   --  9  --  7 5   ASHIA  --   --   --   --   --  8.0*  --  7.6* 7.7*   GLC  --   --   --  77  --  87  --  124* 137*   ALBUMIN  --   --   --   --   --   --   --  1.5* 1.4*    < > = values in this interval not displayed.     No results found for this or any previous visit (from the past 24 hour(s)).  Medications     dextrose 5% lactated ringers 10 mL/hr at 11/05/22 0800     - MEDICATION INSTRUCTIONS -         ARIPiprazole  10 mg Oral QPM     aspirin  325 mg Oral Daily     atorvastatin  40 mg Oral QPM     cefTRIAXone  2 g Intravenous Q24H     enoxaparin ANTICOAGULANT  40 mg Subcutaneous Q12H     fludrocortisone  0.1 mg Oral Daily     miconazole   Topical BID     multivitamin w/minerals  1 tablet Oral Daily     [Held by provider] OLANZapine  2.5 mg Oral At Bedtime     polyethylene glycol  17 g Oral Daily     [Held by provider] potassium chloride  20 mEq Oral BID     senna-docusate  1-2 tablet Oral BID     sertraline  50 mg Oral Daily     sodium chloride (PF)  10-40 mL Intracatheter Q7 Days     sodium chloride (PF)  3 mL Intracatheter Q8H     sodium phosphate  15 mmol Intravenous Once     [Held by provider] spironolactone  12.5 mg Oral Daily

## 2022-11-10 NOTE — PROGRESS NOTES
Care Management Follow Up    Length of Stay (days): 120    Expected Discharge Date: 11/09/2022     Concerns to be Addressed:       Patient plan of care discussed at interdisciplinary rounds: Yes    Anticipated Discharge Disposition: Skilled Nursing Facility     Anticipated Discharge Services:    Anticipated Discharge DME:      Patient/family educated on Medicare website which has current facility and service quality ratings:    Education Provided on the Discharge Plan:    Patient/Family in Agreement with the Plan: yes    Referrals Placed by CM/SW:    Private pay costs discussed: Not applicable    Additional Information:  Pt declined by [facility] due monique admissions on hold with a covid outbreak.     TONY called pt's daughter, Kaylynn to inquire about pt's MA or an elder law , if they could pay privately, or have hired help at home. SW was encouraged to inquire with pt's daughter about these concerns because pt technically does not have coverage/payor source for LTC. SW left a VM regarding the information above.       JOSE GUADALUPE Medina

## 2022-11-10 NOTE — PROGRESS NOTES
[facility]    Medicine Progress Note - Hospitalist Service    Date of Admission:  7/13/2022    Assessment & Plan   Cristy Bailey is a 77 year old female with a PMHx significant for morbid obesity; HTN; and HLD; who presented to [facility] 7/13/2022 with complete left-sided paralysis, left-sided facial droop, and difficulty speaking. CTA head remarkable for right MCA occlusion. She was given tenecteplase and subsequently transferred to [facility] 7/13/2022 for thrombectomy.   Hospital stay complicated by development and progression of flank wound.  This subsequently required surgical debridement and placement of wound vac (8/2) that was subsequently removed (8/22).   Prolonged hospitalization related to difficulties finding LTC placement.     Septic shock, suspect due to bilateral lower extremity cellulitis, left greater than right, shock resolved  * 10/30: Patient initially developed what appeared like a rash involving the left thigh.  * 10/31: Pt with worsened erythema involving both legs with the left being more intense associated with worsening leukocytosis and elevated CRP. Started on pip-tazo and ID consulted. Developed refractory hypotension which required transfer to the ICU where PICC was placed and started on pressors. Started on stress IV steroids. BC's 10/31 NGTD. UA 10/31 abnormal, but UC growing only >100K mixed urogenital garland.  Cortisol level normal.  * 11/2: CT abdomen and pelvis 11/2 showed no focal findings of infection. CT right thigh 11/2 showed no osteomyelitis; no evidence of a discrete fluid collection to suggest an abscess; cellulitis in the abdominal wall and both proximal calves. Pip-tazo changed to ceftriaxone and clindamycin.   * 11/3: Weaned off pressors.  * 11/4: Clindamycin stopped. Started fludrocortisone.  * 11/5: Cellulitis overall improved.  -- Transfer out of ICU 11/5.  -- Discontinue IV hydrocortisone.  -- Continue fludrocortisone for 1  more day   -- Continue ceftriaxone (started 11/2) through 11/10.      PRECIOUS, suspect prerenal related to septic shock, resolved.  * Cr had been normal this hospitalization.  * Cr increased to 2.08 on 10/31 associated with septic shock which was treated as noted.  * CT abdomen/pelvis 11/2 negative for urinary obstructive process.  * Cr normalized 11/2.      Acute R MCA ischemic stroke with edema and right to left midline shift - s/p tenecteplase and subsequent R MCA mechanical thrombectomy 7/13/22  * Presented to [facility] 7/13 with complete paralysis, left-sided facial droop, and aphasia. Code stroke initiated. Head CT 7/13 showed signs of an evolving R MCA infarct. CTA head 7/13 showed a right carotid terminus occlusion, right middle cerebral artery M1 segment occlusion with poor opacification of the more distal right MCA branches/poor collateral flow. CTA neck 7/13 negative for acute occlusions. Pt given tenecteplase 7/13 at 13:11. Noted to be agitated and was subsequently intubated given need for procedure.   * Subsequently transferred to [facility] 7/13/2022 where she underwent above procedure.   * Extubated 7/14. Head CT 7/14 showed evolution of acute infarct involving the R MCA distribution with increased swelling, cortical effacement, and new mild right-to-left midline shift; questionable hypoattenuation involving the bilateral occipital lobes which could be artifactual.  Echo 7/14 showed LVEF 50%, grade 1 diastolic dysfunction.   * Started ASA and atorvastatin per stroke team.   * Repeat head CT 7/15 showed evolving R MCA stroke with areas of edema, overall stable.  -- Continue  mg daily, atorvastatin 40 mg daily.  -- Goal SBP <140/90.   -- Monitored on telemetry for first several wks of hospital stay without abnl rhythm so will not order 30d cardiac event monitor at discharge per neurology recommendations (from 10/18).  -- Follow up with [facility] in 6-8 wks.     Volume overload secondary to acute diastolic  CHF exacerbation, resolved  H/o essential hypertension  * Not on/needing meds PTA.   * As above, echo this stay showed EF 50% with grade I diastolic dysfunction; RV not well visualized but appeared mild-moderately dilated with global systolic function probably mildly reduced.   * BPs were initially soft this stay and required IVFs. BPs improved.   * Subsequently developed volume overload with elevated BP's and diuresed with IV furosemide, subsequently transitioned to PO furosemide.  * Issues with recurrent hypokalemia on furosemide; noted often refusing potassium.  * Started on lisinopril, but subsequently stopped due to soft BP's on furosemide.  * Furosemide increased and spironolactone added 9/3.  * Subsequently soft BP's and doses of BP meds held intermittently and subsequently developed septic shock 10/31 as noted.  * Started on IV furosemide 11/4.  -- Stop IV furosemide.  -- Continue to hold PO furosemide and spironolactone for now  -- Monitor i/o's, daily wts.     Anxiety  Cognitive impairment with intermittent situational confusion  Metabolic encephalopathy, resolved  * On no psychiatric medications PTA.  *  Pt with issues with anxiety and intermittent situational confusion this hospitalization.    * Seen by Psychiatry this hospitalization.  * Started on multiple medications this hospitalization including quetiapine, olanzapine, sertraline, mirtazapine, PRN hydroxyzine, PRN lorazepam.  * Medication regimen was adjusted/titrated. Mirtazapine, PRN lorazepam and PRN hydroxyzine subsequently stopped. See previous progress notes for more details.  *  PRN ativan stopped on 08/27/22 as leading to increased confusion. Hydroxyzine stopped as well.   -- Continue aripiprazole 10 mg qpm; olanzapine 2.5 mg at bedtime; sertraline 50 mg daily  -- Continue PRN olanzapine 5 mg q6h     Lower back/R flank wound/abscess, suspect dt pressure type injury - s/p surgical debridement on 7/30/2022 and placement of wound vac on  8/2/2022 (subsequently removed 8/22/2022).  OA  Hx of bilateral hip replacements  Hx of chronic neck and back pain  * Patient has chronic back pain, reports having it over the last 2 years. MRI L-spine in 2018 showed multilevel degenerative changes with mild central stenosis.  * Pt with significant back pain early on in hospital stay. Was requiring IV hydromorphone.  Dose had to be decreased dt somnolence.  * During this stay, patient was noted with 5-6cm by 2-3cm wound with swelling/fluid/blistering in a fold of her lower back, did not appear infected; this seemed to be the source of her pain. Padded dressing placed and WOC RN ordered. Pain initially improved.   * Was started on a course of amoxicillin-clavulanate on 7/24.   * On 7/26, was re-evaluated by WOC RN. Wound appeared more erythematous and purulence expressed. Worsening with shear stress from lift. Procal <0.05, WBC WNL. Abx changed to IV clindamycin.  * Wound cultures obtained. On 7/28, wound grew proteus and Staph aureus (MRSA neg). US neg for abscess.   * Lost IV access on 7/28 so abx were changed to oral clindamycin and oral levaquin. IV access re-established but was continued on oral abx.  * General surgery consulted, ultimately underwent excisional debridement of R flank abscess in OR on 7/30. Intraop cultures showed polymicrobial growth with proteus mirabilis x2 strains (both pan-sensitive), corynebacterium striatum and enterococcus faecalis.  * Wound vac placed per general surgery on 8/2.  * ID consulted on 8/2 and abx narrowed to amoxicillin-clavulanate alone, completed an additional 5 days of treatment on 8/7.   * Wound vac removed 8/22.  * CT right thigh 11/2 negative for osteomyelitis or deep infection (checked for septic shock) as above.  -- Continue local wound cares per WOC RN, follows weekly  -- Continue PRN oxycodone; minimize opioids as able.  -- Continue regular repositioning  -- Management of other pressure injuries as below     Severe  malnutrition related to acute and chronic medical issues  Significant weight loss this hospitalization  * Nutritionist following. Appetite poor this stay, needing encouragement to take po.   * There was some discussion previously about needing feeding support, but noted that patient does not want to have a feeding tube.  * As of 10/19, has had 107 lb weight loss since admit (374 lbs on 7/14; 267 lbs 10/19); noted some contribution from diuresis for CHF.  -- Continue to encourage oral intake  -- Continue supplements  -- Patient does not want a feeding tube and her health care agent Leo agrees with this, consistent with previous wishes     Vaginal bleeding  Abnormal endometrial thickening on US  * Pt with vaginal bleeding intermittently this hospitalization.  * Pelvic US 10/8 showed abnormal endometrial thickening to 14 mm.   * Gyn consulted 10/9 and recommend outpatient evaluation.  * Hgb overall stable.  -- Follow-up with Gyn outpatient for endometrial sampling in the near future     Deep tissue pressure injury (DTPI) left medial thigh/groin region, hospital acquired, progressed to HAPI stage 2 pressure injury; related to purwick external catheter tubing (not the purwick device itself)  Pressure injuries, bilateral buttocks  * DTPI left medial thigh/groin region noted this hospitalization. WOC RN consulted.   * Pressure injuries in bilateral buttocks noted 9/2. Seen by WOC RN.  -- Continue local wound cares per WOC RN  -- Continue regular repositioning     Dyslipidemia  * FLP this stay showed tot cholest 163, HDL 47, LDL 91, .   * Started on atorvastatin 40 mg daily  -- Continue atorvastatin 40 mg daily     Chronic bilateral LE edema with chronic venous stasis dermatitis and chronic skin changes  Hx of LLE cellulitis  * Was hospitalized in 11/2021 for sepsis dt to pneumonia and LLE cellulitis.   * During this stay, BLE noted to be patty and edematous, no unilateral leg swelling appreciated; LLE had patchy  areas of erythema, no fluctuance, no painful areas with palpation; legs warm to touch. Lymphedema consulted.   -- Cont LE elevation, lymphedema wraps  -- Continue diuretic (as above)     Hypokalemia, hypophosphatemia.  * Potassium and phosphorus low at times this hospitalization. Treated with replacement.            Recent Labs   Lab 11/05/22  0537 11/04/22  0522 11/03/22  1318 11/03/22  0429 11/02/22  1520 11/02/22  0416 11/01/22  1634   POTASSIUM 2.8* 3.6 3.5 3.1*  --  3.6 3.8   MAG 1.9 2.2  --  2.4*  --  2.7* 1.4*   PHOS 2.8 2.3*  --  2.6 5.9* 1.8*  --    - Continue PRN K and phos replacement.     Intertriginous dermatitis  -- Continue clotrimazole powder     Constipation  -- Continue scheduled senna-docusate, polyethylene glycol, PRN bisacodyl     Suspected meningioma left parietal region, incidentally seen on admission CT on 7/13/2022  * Head CT 7/13 showed a calcified extra-axial mass overlying the left parietal region measuring 1.4 cm, potentially representing a meningioma.  -- Will need serial monitoring OP after discharge     Suspected sleep apnea  * O2 drop to mid 80s overnight 8/14. Briefly placed on 4LPM, which she then removed and then slept okay with sats in 90s thereafter.  -- Consider sleep study as outpatient     Morbid obesity with significant weight loss this hospitalization  * BMI 59 on admission.  * Significant weight loss this hospitalization as above (107 lbs as of 10/19). BMI 43.2 on 10/20.  -- Continue aggressive dietary and lifestyle modifications      Goals of care  Failure to thrive  * During this hospitalization, discussed concern about overall prognosis, with her eating 0-25% of her meals and nutrition recommendations for alternative forms of nutrition; at that time, she had lost 70 lbs since admit (some of that was probably related to diuresis and some related to variable weights in hospital, but still significant).   * Patient and significant other/HCA Leo, reiterated her wishes for  DNR/DNI and no feeding tubes.  * Palliative care met with eLo 9/28 and he did not want hospice.  -- Continue restorative cares  -- Pt is DNR/DNI, no tube feedings     OTHER RESOLVED/CHRONIC ISSUES:  Urinary retention, resolved  * Retaining urine on 8/14. UA WNL, not worrisome for infection.  * Requiring straight cath x1 on 8/20 PM.  * Subsequently good UOP, no longer requiring straight cath. No evidence of obstruction on PVR.      Dysphagia due to CVA, resolved  * Seen by SLP and noted with dysphagia. Initially required some intermittent fluid boluses.   * Was eventually able to advance to regular diet w/thin liquids.     Prolonged QTc.  * EKG on 7/13 showed QTc 494.   * Repeat EKG on 7/30 (while on levofloxacin) showed QTc stable at 475. Has since completed course of abx as below.  * Had been monitoring on telemetry this stay. No concerning findings so tele was ultimately dc'd 8/19.     Anemia, suspect dilutional component, resolved  * Hgb normal on admit. Hgb 11.5 on 7/16. No overt clinical signs of major bleeding.  * Hgb subsequently stable and later normalized.    11/10- no new issues.  Completing antibiotics and replacing phos.       Diet: Advance Diet as Tolerated  Regular Diet Adult Thin Liquids (level 0) (Upright position, alert, and assist as needed)  Room Service    DVT Prophylaxis: Enoxaparin (Lovenox) SQ  [place] Catheter: PRESENT, indication: Strict 1-2 Hour I&O, Strict 1-2 Hour I&O  Central Lines: PRESENT  PICC 10/31/22 Triple Lumen Right Brachial vein medial PICC line ordered STAT. Line placed w/o complication. Primary RN Updated.-Site Assessment: WDL  Cardiac Monitoring: None  Code Status: No CPR- Do NOT Intubate      Disposition Plan         The patient's care was discussed with the patiient.  Donal Rodriguez MD  Hospitalist Service  [facility]  Securely message with the Vocera Web Console (learn more here)  Text page via [email or web address] Paging/Directory         Clinically  "Significant Risk Factors        # Hypokalemia: Lowest K = 3.3 mmol/L (Ref range: 3.4-5.3) in last 2 days, will replace as needed       # Hypoalbuminemia: Lowest albumin = 1.3 g/dL (Ref range: 3.5-5.2) at 11/1/2022  4:19 AM, will monitor as appropriate          # Severe Obesity: Estimated body mass index is 47.7 kg/m  as calculated from the following:    Height as of this encounter: 1.676 m (5' 5.98\").    Weight as of this encounter: 134 kg (295 lb 6.7 oz).   # Severe Malnutrition: based on nutrition assessment        ______________________________________________________________________    Interval History   No new complaints     Data reviewed today: I reviewed all medications, new labs and imaging results over the last 24 hours. I personally reviewed no images or EKG's today.    Physical Exam   Vital Signs: Temp: 97.5  F (36.4  C) Temp src: Oral BP: 112/58 Pulse: 72   Resp: 18 SpO2: 96 % O2 Device: None (Room air)    Weight: 295 lbs 6.66 oz  Constitutional: awake, alert, cooperative, no apparent distress  Respiratory: No increased work of breathing, good air exchange, clear to auscultation bilaterally, no crackles or wheezing  Cardiovascular: regular rate and rhythm, normal S1 and S2, no S3 or S4, and no murmur noted  Neuropsychiatric: General: normal, calm and normal eye contact  Skin: legs are still erythematous but a little less     Data   Recent Labs   Lab 11/10/22  0629 11/09/22  1542 11/09/22  0651 11/08/22  2245 11/08/22  0641 11/07/22  0547 11/07/22  0539 11/06/22  0621 11/05/22  1321 11/05/22  0537 11/04/22  0522   WBC  --   --   --   --   --   --   --  7.9  --  5.7 5.8   HGB  --   --   --   --   --   --   --  12.7  --  11.7 12.1   MCV  --   --   --   --   --   --   --  86  --  83 86     --   --   --   --   --   --  282  --  237 233   NA  --   --  141  --   --   --   --  141  --  139 136   POTASSIUM  --   --  4.2  4.0 3.3* 3.1*  --    < > 3.1*   < > 2.8* 3.6   CHLORIDE  --   --  109  --   --   --   " --  109  --  107 108   CO2  --   --  25  --   --   --   --  23  --  25 23   BUN  --   --  11  --   --   --   --  15  --  14 14   CR  --   --  0.46*  --   --   --   --  0.68  --  0.71 0.68   ANIONGAP  --   --  7  --   --   --   --  9  --  7 5   ASHIA  --   --  7.8*  --   --   --   --  8.0*  --  7.6* 7.7*   GLC  --  88 65*  --   --  77  --  87  --  124* 137*   ALBUMIN  --   --   --   --   --   --   --   --   --  1.5* 1.4*    < > = values in this interval not displayed.     No results found for this or any previous visit (from the past 24 hour(s)).  Medications     dextrose 5% lactated ringers 100 mL/hr at 11/10/22 1005     - MEDICATION INSTRUCTIONS -         ARIPiprazole  10 mg Oral QPM     aspirin  325 mg Oral Daily     atorvastatin  40 mg Oral QPM     cefTRIAXone  2 g Intravenous Q24H     enoxaparin ANTICOAGULANT  40 mg Subcutaneous Q12H     fludrocortisone  0.1 mg Oral Daily     miconazole   Topical BID     multivitamin w/minerals  1 tablet Oral Daily     [Held by provider] OLANZapine  2.5 mg Oral At Bedtime     polyethylene glycol  17 g Oral Daily     [Held by provider] potassium chloride  20 mEq Oral BID     senna-docusate  1-2 tablet Oral BID     sertraline  50 mg Oral Daily     sodium chloride (PF)  10-40 mL Intracatheter Q7 Days     sodium chloride (PF)  3 mL Intracatheter Q8H     sodium phosphate  15 mmol Intravenous Q2H     [Held by provider] spironolactone  12.5 mg Oral Daily

## 2022-11-11 NOTE — PLAN OF CARE
Goal Outcome Evaluation:      Plan of Care Reviewed With: patient    A&Ox2, anxious at times, VSS on RA, tolerating diet w/ thin liquids, turn/repo q2 hr but pt refuses at times. R PICC w/ D5 LR's @100 mL/hr. Denies nausea. Pain in bilateral legs, relieved with repositioning. R waist dressing CDI. Carrasquillo patent w/ low UOP, scant vaginal bleeding. Brusing on abdomen. Replaced phosphorus, re-check this AM. Encouraged oral intake. Discharge pending placement.

## 2022-11-12 NOTE — PROGRESS NOTES
Inpatient Progress Note    Patient Name: Laura Argueta  YOB: 1951  MRN: [id number]    PCP: Unknown Pcp Outside [facility]  Referring Provider: No ref. provider found    Subjective   Patient seen and examined. No acute events overnight. Pt denies any complaints at this time including any pain, fevers, chills, shortness of breath, cough. Remains afebrile.     Pt tolerating PO, chest tube in place with ~320 ml out over past 24 hours. Lung bx still in process.     Review of Systems:   Constitutional: Denies fever, chills, sweats   Cardiovascular: Denies CP, palpitations, peripheral edema, syncope   Respiratory: Denies SOB, wheezing, cough, hemoptysis   GI: Denies N/V/D/C, abdominal pain, hematemesis, melena, hematochezia   MSK: Denies muscle pain   Skin: Denies rash, pruritus   Neuro: Denies numbness, tingling, weakness, headache     All other systems are negative.    Objective     Vitals:    02/09/22 1200   BP:    Pulse:    Resp:    Temp: 98.1 °F (36.7 °C)       Physical Exam  General: On 2L NC , Frail in appearance  HEENT: normocephalic, atraumatic, MMM   Neck: supple, nontender  CV: RRR, +S1 +S2, no murmurs/rubs/gallops.   Pulmonary: Mild expiratory rhonchi on L. R chest tube in place   Abdomen: soft, nontender, nondistended. No rigidity, rebound or guarding.   Extremities: no peripheral edema, clubbing, or cyanosis, b/l TMAs  Neuro: No focal deficit  Skin: warm, dry, intact      Intake/Output Summary (Last 24 hours) at 2/9/2022 1448  Last data filed at 2/9/2022 1300  Gross per 24 hour   Intake 375 ml   Output 1850 ml   Net -1475 ml        Recent Labs     02/07/22  0322 02/08/22  0333 02/09/22  0412   WBC 26.8* 27.4* 22.6*   RBC 4.05 3.97* 3.94*   HGB 11.8* 11.5* 11.8*   HCT 36.1 35.1* 35.2*    308 336   MCV 89.1 88.4 89.3   MCH 29.1 29.0 29.9   MCHC 32.7 32.8 33.5   NRBCRE 0 0 0       No results found    Recent Labs   Lab 02/09/22  0412 02/08/22  0333 02/06/22  0343 02/05/22  0554 02/04/22  0525  [facility]    Medicine Progress Note - Hospitalist Service    Date of Admission:  7/13/2022    Assessment & Plan   Cristy Bailey is a 77 year old female with a PMHx significant for morbid obesity; HTN; and HLD; who presented to [facility] 7/13/2022 with complete left-sided paralysis, left-sided facial droop, and difficulty speaking. CTA head remarkable for right MCA occlusion. She was given tenecteplase and subsequently transferred to [facility] 7/13/2022 for thrombectomy.   Hospital stay complicated by development and progression of flank wound.  This subsequently required surgical debridement and placement of wound vac (8/2) that was subsequently removed (8/22).   Prolonged hospitalization related to difficulties finding LTC placement.     Septic shock, suspect due to bilateral lower extremity cellulitis, left greater than right, shock resolved  * 10/30: Patient initially developed what appeared like a rash involving the left thigh.  * 10/31: Pt with worsened erythema involving both legs with the left being more intense associated with worsening leukocytosis and elevated CRP. Started on pip-tazo and ID consulted. Developed refractory hypotension which required transfer to the ICU where PICC was placed and started on pressors. Started on stress IV steroids. BC's 10/31 NGTD. UA 10/31 abnormal, but UC growing only >100K mixed urogenital garland.  Cortisol level normal.  * 11/2: CT abdomen and pelvis 11/2 showed no focal findings of infection. CT right thigh 11/2 showed no osteomyelitis; no evidence of a discrete fluid collection to suggest an abscess; cellulitis in the abdominal wall and both proximal calves. Pip-tazo changed to ceftriaxone and clindamycin.   * 11/3: Weaned off pressors.  * 11/4: Clindamycin stopped. Started fludrocortisone.  * 11/5: Cellulitis overall improved.  -- Transfer out of ICU 11/5.  -- Discontinue IV hydrocortisone.  -- Continue fludrocortisone- blood    SODIUM 138 140 138 140 142   POTASSIUM 3.9 3.5 4.2 4.1 4.3   CHLORIDE 107 107 108* 109* 111*   CO2 25 27 27 28 26   GLUCOSE 141* 106* 314* 245* 180*   BUN 27* 32* 55* 52* 56*   CREATININE 0.76 0.96* 0.89 1.42* 1.52*   CALCIUM 7.7* 7.6* 7.8* 7.9* 8.1*   TOTPROTEIN  --   --  5.2* 5.3* 5.4*   ALBUMIN  --   --  2.0* 2.0* 1.9*   BILIRUBIN  --   --  0.9 1.1* 0.9   AST  --   --  21 34 23   GPT  --   --  41 44 31   ALKPT  --   --  135* 104 105       No results found    No results found    No results found    Inpatient Medications  • furosemide  40 mg Oral Daily   • amLODIPine  5 mg Oral Daily   • enoxaparin  40 mg Subcutaneous Daily   • gabapentin  100 mg Oral 2 times per day   • predniSONE  20 mg Oral Daily with breakfast   • insulin lispro   Subcutaneous 4x Daily   • tamsulosin  0.4 mg Oral Daily PC   • metoPROLOL tartrate  25 mg Oral 2 times per day   • buPROPion XL  150 mg Oral Daily   • sodium chloride (PF)  2 mL Intracatheter 2 times per day   • atorvastatin  20 mg Oral Daily     • sodium chloride 0.9% infusion     • sodium chloride 0.9% infusion Stopped (02/04/22 0911)     lidocaine 2% urethral, dextrose, dextrose, glucagon, dextrose, dextrose, magnesium sulfate, calcium gluconate IVPB, ondansetron (ZOFRAN) parenteral, sodium chloride, sodium chloride, acetaminophen, ondansetron, sodium chloride, sodium chloride     Assessment and Plan     Laura Argueta is a 70 year old female PMH chronic systolic heart failure, A. fib with RVR, HTN, DM, PAD, previous metatarsal amputation with bypass, valve repair with CABG G, carotid endarterectomy presented to the ED initially with A. nel with RVR. She underwent RAJAT and cardioversion. Her hospital course was complicated by PNA, ATN, hemothorax and cardiac arrest.      Neuro:   #Mood disorder   - Gabapentin 100mg Q12H and bupropion 150 mg daily   - pt is non decisional per psych evaluation       HEENT:   Hx of right eye blindness 2/2 retinal vein  pressure a little low   -- completed ceftriaxone      PRECIOUS, suspect prerenal related to septic shock, resolved.  * Cr had been normal this hospitalization.  * Cr increased to 2.08 on 10/31 associated with septic shock which was treated as noted.  * CT abdomen/pelvis 11/2 negative for urinary obstructive process.  * Cr normalized 11/2.      Acute R MCA ischemic stroke with edema and right to left midline shift - s/p tenecteplase and subsequent R MCA mechanical thrombectomy 7/13/22  * Presented to [facility] 7/13 with complete paralysis, left-sided facial droop, and aphasia. Code stroke initiated. Head CT 7/13 showed signs of an evolving R MCA infarct. CTA head 7/13 showed a right carotid terminus occlusion, right middle cerebral artery M1 segment occlusion with poor opacification of the more distal right MCA branches/poor collateral flow. CTA neck 7/13 negative for acute occlusions. Pt given tenecteplase 7/13 at 13:11. Noted to be agitated and was subsequently intubated given need for procedure.   * Subsequently transferred to [facility] 7/13/2022 where she underwent above procedure.   * Extubated 7/14. Head CT 7/14 showed evolution of acute infarct involving the R MCA distribution with increased swelling, cortical effacement, and new mild right-to-left midline shift; questionable hypoattenuation involving the bilateral occipital lobes which could be artifactual.  Echo 7/14 showed LVEF 50%, grade 1 diastolic dysfunction.   * Started ASA and atorvastatin per stroke team.   * Repeat head CT 7/15 showed evolving R MCA stroke with areas of edema, overall stable.  -- Continue  mg daily, atorvastatin 40 mg daily.  -- Goal SBP <140/90.   -- Monitored on telemetry for first several wks of hospital stay without abnl rhythm so will not order 30d cardiac event monitor at discharge per neurology recommendations (from 10/18).  -- Follow up with [facility] in 6-8 wks.     Volume overload secondary to acute diastolic CHF exacerbation,  resolved  H/o essential hypertension  * Not on/needing meds PTA.   * As above, echo this stay showed EF 50% with grade I diastolic dysfunction; RV not well visualized but appeared mild-moderately dilated with global systolic function probably mildly reduced.   * BPs were initially soft this stay and required IVFs. BPs improved.   * Subsequently developed volume overload with elevated BP's and diuresed with IV furosemide, subsequently transitioned to PO furosemide.  * Issues with recurrent hypokalemia on furosemide; noted often refusing potassium.  * Started on lisinopril, but subsequently stopped due to soft BP's on furosemide.  * Furosemide increased and spironolactone added 9/3.  * Subsequently soft BP's and doses of BP meds held intermittently and subsequently developed septic shock 10/31 as noted.  * Started on IV furosemide 11/4.  -- Stopped IV furosemide.  -- Continue to hold PO furosemide and spironolactone for now  -- Monitor i/o's, daily wts.     Anxiety  Cognitive impairment with intermittent situational confusion  Metabolic encephalopathy, resolved  * On no psychiatric medications PTA.  *  Pt with issues with anxiety and intermittent situational confusion this hospitalization.    * Seen by Psychiatry this hospitalization.  * Started on multiple medications this hospitalization including quetiapine, olanzapine, sertraline, mirtazapine, PRN hydroxyzine, PRN lorazepam.  * Medication regimen was adjusted/titrated. Mirtazapine, PRN lorazepam and PRN hydroxyzine subsequently stopped. See previous progress notes for more details.  *  PRN ativan stopped on 08/27/22 as leading to increased confusion. Hydroxyzine stopped as well.   -- Continue aripiprazole 10 mg qpm; olanzapine 2.5 mg at bedtime; sertraline 50 mg daily  -- Continue PRN olanzapine 5 mg q6h     Lower back/R flank wound/abscess, suspect dt pressure type injury - s/p surgical debridement on 7/30/2022 and placement of wound vac on 8/2/2022 (subsequently  thrombosis     Cardiovascular:   # s/p PEA cardiac arrest   #Hemorhagic shock (resolved)    #Afib w RVR s/p RAJAT + cardioversion  - hemorrhagic shock after hemothorax s/p thora   - Cardiac arrest --> ROSC was achieved after 4 rounds of CPR and intubation  - Pt received 3 U of blood and 2 U FFP after cardiac arrest    - hemodynamically stable at this time   - Afib w RVR s/p cardioversion and RAJAT on 1/7  -[id number] Stroke Risk Score = 5 (Female, DM, HTN, Vascular disease, Age)  Plan:   - Cardiology following recommended Metoprolol tartrate 25mg Q12  - currently rate controlled   - Amiodarone held in setting of concern for amiodarone toxicity   - Anticoagulation held in setting of previous hemorrhagic shock and bloody output from chest tube --> thoracic surgery wants to continue to hold therapeutic anticoagulation at this time     # Chronic heart failure with preserved ejection fraction   - restart lasix 40 PO     #Hx of CAD   #Hx of HLD   - continue atorvastatin 20 mg daily      # Hx of HTN   - begin reinitiating home BP meds, amlodipine 10 today     Respiratory:   #Right hemothorax s/p chest tube   - s/p thoracentesis --> right finger thoracostomy and chest tube placement as well as a right subclavian cordis to facilitate blood transfusion  Plan:   - maintain chest tube to suction, thoracic surgery is following   - R VATS,  clean out of right lung + biopsy on Monday 1/31/22  - path pending  - chest tube in place      # s/p CAP treatment     # Acute hypoxic respiratory failure, improving    - thought to be 2/2 amiodarone toxicity   - attempt to wean oxygen off   - prednisone daily, duration of therapy dependent on pt response to medication       Renal / Metabolic:   # ALYSSA, resolved   # Urinary retention  - nephrology following  - urology has seen pt in past, to follow up outpatient   - Flomax 0.4 mg daily   - pt Barnes pulled yesterday, ID recommends intermittent cath for retention  - yesterday retaining 600 ml      ID:   # Pneumonitis  # CAP s/p ABx course - resolved  # Leukocytosis  - CXR showed LL consolidation --> was treated with cefepime x 5 d  - Leukocytosis improving, most likely related to steroids   - CXR 2/8 with mildly increased opacities L lung, some mild expiratory rhonchi appreciated. May be from aspiration vs compressive atelectasis vs less likely infectious    - repeat procal unremarkable   Plan  - get pt up to chair   - encourage incentive spirometry       Endocrine:   #DMT2  #Diabetic Neuropathy  Plan:  - MDSSI now that pt is eating   - BG goal of 140-180 per NICE Sugar trial  - Gabapentin solution for diabetic neuropathy      Code Status: FULL CODE  Decision-making Capacity: pt is not decisional    Healthcare POA/Surrogate Decision Maker: Daughter, Lilia    Discussed with Dr. Ham Foss   Resident Physician PGY1  [employer]    removed 8/22/2022).  OA  Hx of bilateral hip replacements  Hx of chronic neck and back pain  * Patient has chronic back pain, reports having it over the last 2 years. MRI L-spine in 2018 showed multilevel degenerative changes with mild central stenosis.  * Pt with significant back pain early on in hospital stay. Was requiring IV hydromorphone.  Dose had to be decreased dt somnolence.  * During this stay, patient was noted with 5-6cm by 2-3cm wound with swelling/fluid/blistering in a fold of her lower back, did not appear infected; this seemed to be the source of her pain. Padded dressing placed and WOC RN ordered. Pain initially improved.   * Was started on a course of amoxicillin-clavulanate on 7/24.   * On 7/26, was re-evaluated by WOC RN. Wound appeared more erythematous and purulence expressed. Worsening with shear stress from lift. Procal <0.05, WBC WNL. Abx changed to IV clindamycin.  * Wound cultures obtained. On 7/28, wound grew proteus and Staph aureus (MRSA neg). US neg for abscess.   * Lost IV access on 7/28 so abx were changed to oral clindamycin and oral levaquin. IV access re-established but was continued on oral abx.  * General surgery consulted, ultimately underwent excisional debridement of R flank abscess in OR on 7/30. Intraop cultures showed polymicrobial growth with proteus mirabilis x2 strains (both pan-sensitive), corynebacterium striatum and enterococcus faecalis.  * Wound vac placed per general surgery on 8/2.  * ID consulted on 8/2 and abx narrowed to amoxicillin-clavulanate alone, completed an additional 5 days of treatment on 8/7.   * Wound vac removed 8/22.  * CT right thigh 11/2 negative for osteomyelitis or deep infection (checked for septic shock) as above.  -- Continue local wound cares per WOC RN, follows weekly  -- Continue PRN oxycodone; minimize opioids as able.  -- Continue regular repositioning  -- Management of other pressure injuries as below     Severe malnutrition related  to acute and chronic medical issues  Significant weight loss this hospitalization  * Nutritionist following. Appetite poor this stay, needing encouragement to take po.   * There was some discussion previously about needing feeding support, but noted that patient does not want to have a feeding tube.  * As of 10/19, has had 107 lb weight loss since admit (374 lbs on 7/14; 267 lbs 10/19); noted some contribution from diuresis for CHF.  -- Continue to encourage oral intake  -- Continue supplements  -- Patient does not want a feeding tube and her health care agent Leo agrees with this, consistent with previous wishes     Vaginal bleeding  Abnormal endometrial thickening on US  * Pt with vaginal bleeding intermittently this hospitalization.  * Pelvic US 10/8 showed abnormal endometrial thickening to 14 mm.   * Gyn consulted 10/9 and recommend outpatient evaluation.  * Hgb overall stable.  -- Follow-up with Gyn outpatient for endometrial sampling in the near future  Bleeding again today- hold enoxaparin      Deep tissue pressure injury (DTPI) left medial thigh/groin region, hospital acquired, progressed to HAPI stage 2 pressure injury; related to purwick external catheter tubing (not the purwick device itself)  Pressure injuries, bilateral buttocks  * DTPI left medial thigh/groin region noted this hospitalization. WOC RN consulted.   * Pressure injuries in bilateral buttocks noted 9/2. Seen by WOC RN.  -- Continue local wound cares per WOC RN  -- Continue regular repositioning     Dyslipidemia  * FLP this stay showed tot cholest 163, HDL 47, LDL 91, .   * Started on atorvastatin 40 mg daily  -- Continue atorvastatin 40 mg daily     Chronic bilateral LE edema with chronic venous stasis dermatitis and chronic skin changes  Hx of LLE cellulitis  * Was hospitalized in 11/2021 for sepsis dt to pneumonia and LLE cellulitis.   * During this stay, BLE noted to be patty and edematous, no unilateral leg swelling  appreciated; LLE had patchy areas of erythema, no fluctuance, no painful areas with palpation; legs warm to touch. Lymphedema consulted.   -- Cont LE elevation, lymphedema wraps  -- holding diuretic      Hypokalemia, hypophosphatemia.  * Potassium and phosphorus low at times this hospitalization. Treated with replacement.            Recent Labs   Lab 11/05/22  0537 11/04/22  0522 11/03/22  1318 11/03/22  0429 11/02/22  1520 11/02/22  0416 11/01/22  1634   POTASSIUM 2.8* 3.6 3.5 3.1*  --  3.6 3.8   MAG 1.9 2.2  --  2.4*  --  2.7* 1.4*   PHOS 2.8 2.3*  --  2.6 5.9* 1.8*  --    - Continue PRN K and phos replacement.     Intertriginous dermatitis  -- Continue clotrimazole powder     Constipation  -- Continue scheduled senna-docusate, polyethylene glycol, PRN bisacodyl     Suspected meningioma left parietal region, incidentally seen on admission CT on 7/13/2022  * Head CT 7/13 showed a calcified extra-axial mass overlying the left parietal region measuring 1.4 cm, potentially representing a meningioma.  -- Will need serial monitoring OP after discharge     Suspected sleep apnea  * O2 drop to mid 80s overnight 8/14. Briefly placed on 4LPM, which she then removed and then slept okay with sats in 90s thereafter.  -- Consider sleep study as outpatient     Morbid obesity with significant weight loss this hospitalization  * BMI 59 on admission.  * Significant weight loss this hospitalization as above (107 lbs as of 10/19). BMI 43.2 on 10/20.  -- Continue aggressive dietary and lifestyle modifications      Goals of care  Failure to thrive  * During this hospitalization, discussed concern about overall prognosis, with her eating 0-25% of her meals and nutrition recommendations for alternative forms of nutrition; at that time, she had lost 70 lbs since admit (some of that was probably related to diuresis and some related to variable weights in hospital, but still significant).   * Patient and significant other/HCA Leo  reiterated her wishes for DNR/DNI and no feeding tubes.  * Palliative care met with Leo 9/28 and he did not want hospice.  -- Continue restorative cares  -- Pt is DNR/DNI, no tube feedings     OTHER RESOLVED/CHRONIC ISSUES:  Urinary retention, resolved  * Retaining urine on 8/14. UA WNL, not worrisome for infection.  * Requiring straight cath x1 on 8/20 PM.  * Subsequently good UOP, no longer requiring straight cath. No evidence of obstruction on PVR.      Dysphagia due to CVA, resolved  * Seen by SLP and noted with dysphagia. Initially required some intermittent fluid boluses.   * Was eventually able to advance to regular diet w/thin liquids.     Prolonged QTc.  * EKG on 7/13 showed QTc 494.   * Repeat EKG on 7/30 (while on levofloxacin) showed QTc stable at 475. Has since completed course of abx as below.  * Had been monitoring on telemetry this stay. No concerning findings so tele was ultimately dc'd 8/19.     Anemia, suspect dilutional component, resolved  * Hgb normal on admit. Hgb 11.5 on 7/16. No overt clinical signs of major bleeding.  * Hgb subsequently stable and later normalized.    11/12- glucose fine- elevated reading was drawn near intravenous line running dextrose.  Bleeding again today- hold enoxaparin.  GYN wanted to do outpatient evaluation but if she continues to bleeding will ask GYN to follow up here.      Diet: Advance Diet as Tolerated  Regular Diet Adult Thin Liquids (level 0) (Upright position, alert, and assist as needed)  Room Service    DVT Prophylaxis: Enoxaparin (Lovenox) subcutaneous- held today   [place] Catheter: PRESENT, indication: Strict 1-2 Hour I&O, Strict 1-2 Hour I&O  Central Lines: PRESENT  PICC 10/31/22 Triple Lumen Right Brachial vein medial PICC line ordered STAT. Line placed w/o complication. Primary RN Updated.-Site Assessment: WDL  Cardiac Monitoring: None  Code Status: No CPR- Do NOT Intubate      Disposition Plan         The patient's care was discussed with the  "patiient.  Donal Rodriguez MD  Hospitalist Service  [facility]  Securely message with the [employer] Web Console (learn more here)  Text page via [employer] Paging/Directory     Clinically Significant Risk Factors        # Hypokalemia: Lowest K = 3.2 mmol/L (Ref range: 3.4-5.3) in last 2 days, will replace as needed     # Hypomagnesemia: Lowest Mg = 1.6 mg/dL (Ref range: 1.7-2.3) in last 2 days, will replace as needed   # Hypoalbuminemia: Lowest albumin = 1.3 g/dL (Ref range: 3.5-5.2) at 11/1/2022  4:19 AM, will monitor as appropriate          # Severe Obesity: Estimated body mass index is 47.7 kg/m  as calculated from the following:    Height as of this encounter: 1.676 m (5' 5.98\").    Weight as of this encounter: 134 kg (295 lb 6.7 oz).   # Severe Malnutrition: based on nutrition assessment        ______________________________________________________________________    Interval History   No new complaints but having some vaginal bleeding today.     Data reviewed today: I reviewed all medications, new labs and imaging results over the last 24 hours. I personally reviewed no images or EKG's today.    Physical Exam   Vital Signs: Temp: 98.4  F (36.9  C) Temp src: Oral BP: (!) 145/91 Pulse: 80   Resp: 16 SpO2: 96 % O2 Device: None (Room air)    Weight: 295 lbs 6.66 oz  Constitutional: awake, alert, cooperative, no apparent distress  Respiratory: No increased work of breathing, good air exchange, clear to auscultation bilaterally, no crackles or wheezing  Cardiovascular: regular rate and rhythm, normal S1 and S2, no S3 or S4, and no murmur noted  Neuropsychiatric: General: normal, calm and normal eye contact  Skin: legs are ace wrapped     Data   Recent Labs   Lab 11/12/22  0621 11/12/22  0230 11/11/22  1647 11/11/22  0630 11/10/22  0629 11/09/22  1542 11/09/22  0651 11/07/22  0539 11/06/22 0621   WBC  --   --   --   --   --   --   --   --  7.9   HGB  --   --   --   --   --   --   --   --  " 12.7   MCV  --   --   --   --   --   --   --   --  86   PLT  --   --   --   --  291  --   --   --  282   NA  --   --   --   --   --   --  141  --  141   POTASSIUM 3.6 3.8 3.3* 3.2*  --   --  4.2  4.0   < > 3.1*   CHLORIDE  --   --   --   --   --   --  109  --  109   CO2  --   --   --   --   --   --  25  --  23   BUN  --   --   --   --   --   --  11  --  15   CR  --   --   --   --   --   --  0.46*  --  0.68   ANIONGAP  --   --   --   --   --   --  7  --  9   ASHIA  --   --   --   --   --   --  7.8*  --  8.0*   *  --  87 463*  --    < > 65*   < > 87    < > = values in this interval not displayed.     No results found for this or any previous visit (from the past 24 hour(s)).  Medications     dextrose 5% and 0.45% NaCl 75 mL/hr at 11/11/22 1927     - MEDICATION INSTRUCTIONS -         ARIPiprazole  10 mg Oral QPM     aspirin  325 mg Oral Daily     atorvastatin  40 mg Oral QPM     [Held by provider] enoxaparin ANTICOAGULANT  40 mg Subcutaneous Q12H     fludrocortisone  0.1 mg Oral Daily     miconazole   Topical BID     multivitamin w/minerals  1 tablet Oral Daily     [Held by provider] OLANZapine  2.5 mg Oral At Bedtime     polyethylene glycol  17 g Oral Daily     [Held by provider] potassium chloride  20 mEq Oral BID     senna-docusate  1-2 tablet Oral BID     sertraline  50 mg Oral Daily     sodium chloride (PF)  10-40 mL Intracatheter Q7 Days     sodium chloride (PF)  3 mL Intracatheter Q8H     [Held by provider] spironolactone  12.5 mg Oral Daily

## 2022-11-12 NOTE — PLAN OF CARE
11/11/22 [id number]    A&Ox2, anxious at times, VSS on RA, Poor appetite. Did not want to take AM medications because they made her sick. Q2hr repo. Wound cares done. Interdry to skin folds. R PICC w/ D5 LR's @100 mL/hr.  this AM, fluids stopped, Spot check in evening was 87. MD paged. Phos and K replaced today recheck for K was 3.3 2nd replacement ordered. Pain in bilateral legs and feet controlled with PRN oxy. Discharge pending placement.

## 2022-11-12 NOTE — PLAN OF CARE
A&Ox2, anxious at times. VSS on RA. PICC infusing D5 + 0.45% NaCl at 75 ml/hr. Tolerating regular diet w/ thin liquids, but has a poor appetite. Assist of 2 with lift. T/R q2hr, refuses at times. Carrasquillo patent with cloudy deborah output. Refuses some medications this shift. Dark red vaginal discharge noted. On K+, Mg, Ph protocols. K+ redraw at 0600. Pain in bilateral legs and feet, denied medication intervention. Discharge pending for placement.

## 2022-11-12 NOTE — PROVIDER NOTIFICATION
MD Notification    Notified Person: MD    Notified Person Name: Dr. Rockwell    Notification Date/Time: 11/11/22 6:13 PM    Notification Interaction: Text Page    Purpose of Notification: Pt BG was 463 this AM so we stopped her d5% and I just did a spot check and its back down to 87. Pt has poor appetite. Mei CARUSO RN    Orders Received:    Comments:

## 2022-11-12 NOTE — CONSULTS
Chart review performed.     Cristy is a 77 year old female who has been hospitalized about 4 months with stroke, cellulitis, septic shock, and a flank wound.  During her hospitalization she developed vaginal bleeding.  A pelvic ultrasound was performed which showed a thickened endometrial lining at 14 mm.   A CT last week showed a normal uterus without mass.  I personally reviewed her images.      I spoke to her  Leo over the phone and asked whether evaluation for endometrial cancer aligned with her goals for care, particularly because at this time she is not well enough to undergo a hysterectomy or chemotherapy if endometrial cancer were diagnosed.  He requests that we evaluate her bleeding.      For now it would be reasonable to start Provera 10 mg daily (up to TID titrated to stop bleeding) and we can schedule Dilation and curettage in the operating room this week. We could also consider proactively placing a mirena IUD under anesthesia to reverse endometrial hyperpasia if that is ultimately the diagnosis on path.    Nathalie Caldera MD  Obstetrics, Gynecology, and Infertility  11/12/2022

## 2022-11-13 NOTE — PROGRESS NOTES
VSS. Alert to self only. Refused repo times or food (need help with feeding, very poor appetite). Lower back dressing changed, small vaginal bleed. Carrasquillo good UOP. Denies pain. K & mag replaced. Recheck @ 2000. LE severe edema. Compression stocking applied. Some redness under L breast, no open wound on any other parts.

## 2022-11-13 NOTE — PROGRESS NOTES
[facility]    Medicine Progress Note - Hospitalist Service    Date of Admission:  7/13/2022    Assessment & Plan   Cristy Bailey is a 77 year old female with a PMHx significant for morbid obesity; HTN; and HLD; who presented to [facility] 7/13/2022 with complete left-sided paralysis, left-sided facial droop, and difficulty speaking. CTA head remarkable for right MCA occlusion. She was given tenecteplase and subsequently transferred to [facility] 7/13/2022 for thrombectomy.   Hospital stay complicated by development and progression of flank wound.  This subsequently required surgical debridement and placement of wound vac (8/2) that was subsequently removed (8/22).   Prolonged hospitalization related to difficulties finding LTC placement.    Acute right MCA ischemic stroke with edema and right to left midline shift - status post tenecteplase and subsequent right  MCA mechanical thrombectomy 7/13/22  Presented to [facility] 7/13 with complete paralysis, left-sided facial droop, and aphasia. Code stroke initiated. Head CT 7/13 showed signs of an evolving R MCA infarct. CTA head 7/13 showed a right carotid terminus occlusion, right middle cerebral artery M1 segment occlusion with poor opacification of the more distal right MCA branches/poor collateral flow. CTA neck 7/13 negative for acute occlusions. Pt given tenecteplase 7/13 at 13:11. Noted to be agitated and was subsequently intubated given need for procedure.   Subsequently transferred to [facility] 7/13/2022 where she underwent above procedure.   Extubated 7/14. Head CT 7/14 showed evolution of acute infarct involving the R MCA distribution with increased swelling, cortical effacement, and new mild right-to-left midline shift; questionable hypoattenuation involving the bilateral occipital lobes which could be artifactual.  Echo 7/14 showed LVEF 50%, grade 1 diastolic dysfunction.   Started ASA and atorvastatin per stroke team.    Repeat head CT 7/15 showed evolving R MCA stroke with areas of edema, overall stable.    Continue  mg daily, atorvastatin 40 mg daily.    Goal SBP <140/90.     Monitored on telemetry for first several wks of hospital stay without abnl rhythm so will not order 30d cardiac event monitor at discharge per neurology recommendations (from 10/18)    Follow up with [facility] in 6-8 wks    Suspected meningioma left parietal region, incidentally seen on admission CT on 7/13/2022  Head CT 7/13 showed a calcified extra-axial mass overlying the left parietal region measuring 1.4 cm, potentially representing a meningioma.    Will need serial monitoring OP after discharge     Septic shock, suspect due to bilateral lower extremity cellulitis, left greater than right, resolved  10/30: Patient initially developed what appeared like a rash involving the left thigh.  10/31: Pt with worsened erythema involving both legs with the left being more intense associated with worsening leukocytosis and elevated CRP. Started on pip-tazo and ID consulted. Developed refractory hypotension which required transfer to the ICU where PICC was placed and started on pressors. Started on stress IV steroids. BC's 10/31 NGTD. UA 10/31 abnormal, but UC growing only >100K mixed urogenital garland.  Cortisol level normal.  11/2: CT abdomen and pelvis 11/2 showed no focal findings of infection. CT right thigh 11/2 showed no osteomyelitis; no evidence of a discrete fluid collection to suggest an abscess; cellulitis in the abdominal wall and both proximal calves. Pip-tazo changed to ceftriaxone and clindamycin.   11/3: Weaned off pressors.  11/4: Clindamycin stopped. Started fludrocortisone.  11/5: Cellulitis overall improved and transferred out of the intensive care unit    She has now completed antibiotic therapy.    Continuing low-dose fludrocortisone due to persistent hypotension    Continue lymphedema wraps to legs      Acute kidney injury, suspect prerenal  related to septic shock, resolved  Hypokalemia   Hypomagnesemia    Hypophosphatemia    Cr had been normal this hospitalization.  Cr increased to 2.08 on 10/31 associated with septic shock which was treated as noted.  CT abdomen/pelvis 11/2 negative for urinary obstructive process.  Cr normalized 11/2.     Continue to replace electrolytes as needed per protocol     Volume overload secondary to acute diastolic CHF exacerbation, resolved  History of essential hypertension  Not on/needing meds PTA.   As above, echo this stay showed EF 50% with grade I diastolic dysfunction; RV not well visualized but appeared mild-moderately dilated with global systolic function probably mildly reduced.   BPs were initially soft this stay and required IVFs. BPs improved.   Subsequently developed volume overload with elevated BP's and diuresed with IV furosemide, subsequently transitioned to PO furosemide.  Issues with recurrent hypokalemia on furosemide; noted often refusing potassium.  Started on lisinopril, but subsequently stopped due to soft BP's on furosemide.  Furosemide increased and spironolactone added 9/3.  Subsequently soft BP's and doses of BP meds held intermittently and subsequently developed septic shock 10/31 as noted.  Started on IV furosemide 11/4, now stopped    Continue to monitor volume status and use diuretics as needed     Anxiety  Cognitive impairment with intermittent situational confusion  Metabolic encephalopathy, resolved  On no psychiatric medications PTA.  Pt has issues with anxiety and intermittent situational confusion this hospitalization.    Seen by Psychiatry this hospitalization.  Started on multiple medications this hospitalization including quetiapine, olanzapine, sertraline, mirtazapine, PRN hydroxyzine, PRN lorazepam.  Medication regimen was adjusted/titrated. Mirtazapine, PRN lorazepam and PRN hydroxyzine subsequently stopped. See previous progress notes for more details.  *PRN ativan stopped  on 08/27/22 as leading to increased confusion. Hydroxyzine stopped as well.     Continue aripiprazole 10 mg qpm; olanzapine 2.5 mg at bedtime; sertraline 50 mg daily    Continue PRN olanzapine 5 mg q6h     Lower back/right flank wound/abscess, suspect due to pressure type injury status post surgical debridement on 7/30/2022 and placement of wound vac on 8/2/2022 (subsequently removed 8/22/2022)  Deep tissue pressure injury (DTPI) left medial thigh/groin region, hospital acquired, progressed to HAPI stage 2 pressure injury; related to purwick external catheter tubing (not the purwick device itself)  Pressure injuries, bilateral buttocks  Intertriginous dermatitis  Osteoarthritis    History of bilateral hip replacements  History of chronic neck and back pain  Patient has chronic back pain, reports having it over the last 2 years. MRI L-spine in 2018 showed multilevel degenerative changes with mild central stenosis.  Pt had significant back pain early on in hospital stay. Was requiring IV hydromorphone.  Dose had to be decreased dt somnolence.  During this stay, patient was noted with 5-6cm by 2-3cm wound with swelling/fluid/blistering in a fold of her lower back, did not appear infected; this seemed to be the source of her pain. Padded dressing placed and WOC RN ordered. Pain initially improved.   Was started on a course of amoxicillin-clavulanate on 7/24.   On 7/26, was re-evaluated by WOC RN. Wound appeared more erythematous and purulence expressed. Worsening with shear stress from lift. Procal <0.05, WBC WNL. Abx changed to IV clindamycin.  Wound cultures obtained. On 7/28, wound grew proteus and Staph aureus (MRSA neg). US neg for abscess.   Lost IV access on 7/28 so abx were changed to oral clindamycin and oral levaquin. IV access re-established but was continued on oral abx.  General surgery consulted, ultimately underwent excisional debridement of R flank abscess in OR on 7/30. Intraop cultures showed  polymicrobial growth with proteus mirabilis x2 strains (both pan-sensitive), corynebacterium striatum and enterococcus faecalis.  Wound vac placed per general surgery on 8/2.  ID consulted on 8/2 and abx narrowed to amoxicillin-clavulanate alone, completed an additional 5 days of treatment on 8/7.   Wound vac removed 8/22.  CT right thigh 11/2 negative for osteomyelitis or deep infection (checked for septic shock) as above.    Continue local wound cares per WOC RN, follows weekly    Continue topical antifungal     Continue PRN oxycodone; minimize opioids as able    Continue regular repositioning    Management of other pressure injuries per WOC recommendations     Severe malnutrition related to acute and chronic medical issues  Significant weight loss this hospitalization  Nutritionist following. Appetite poor this stay, needing encouragement to take po.   There was some discussion previously about needing feeding support, but noted that patient does not want to have a feeding tube.  As of 10/19, has had 107 lb weight loss since admit (374 lbs on 7/14; 267 lbs 10/19); noted some contribution from diuresis for CHF.  Her appetite remains very poor.  She is refusing a feeding tube.    Continue to encourage oral intake    Continue supplements     Vaginal bleeding  Abnormal endometrial thickening on US  Pt has had vaginal bleeding intermittently this hospitalization.  Pelvic US 10/8 showed abnormal endometrial thickening to 14 mm.   Gyn consulted 10/9 and recommend outpatient evaluation.  The initial plan was for an outpatient evaluation but on November 11 the patient developed further vaginal bleeding.  GYN followed up and is planning a D&C this week if her family is agreeable.    Possible D&C later this week    Continue to hold enoxaparin DVT prophylaxis      Dyslipidemia  FLP this stay showed tot cholest 163, HDL 47, LDL 91, .   Started on atorvastatin 40 mg daily    Continue atorvastatin 40 mg daily for  secondary stroke prevention     Chronic bilateral lower extremity edema with chronic venous stasis dermatitis and chronic skin changes  History of left lower extremity cellulitis   Was hospitalized in 11/2021 for sepsis dt to pneumonia and LLE cellulitis.   During this stay, BLE noted to be patty and edematous, no unilateral leg swelling appreciated; LLE had patchy areas of erythema, no fluctuance, no painful areas with palpation; legs warm to touch. Lymphedema consulted.     Continue leg elevation, lymphedema wraps     Constipation    Continue scheduled senna-docusate, polyethylene glycol, PRN bisacodyl     Suspected sleep apnea  O2 drop to mid 80s overnight 8/14. Briefly placed on 4LPM, which she then removed and then slept okay with sats in 90s thereafter.    Consider sleep study as outpatient     Morbid obesity with significant weight loss this hospitalization  BMI 59 on admission.  Significant weight loss this hospitalization as above (107 lbs as of 10/19). BMI 43.2 on 10/20.     Goals of care  Failure to thrive  During this hospitalization, discussed concern about overall prognosis, with her eating 0-25% of her meals and nutrition recommendations for alternative forms of nutrition; at that time, she had lost 70 lbs since admit (some of that was probably related to diuresis and some related to variable weights in hospital, but still significant).   Patient and significant other/HCA Leo, reiterated her wishes for DNR/DNI and no feeding tubes.  Palliative care met with Leo 9/28 and he did not want hospice.  I met with Leo and the patient's daughter on November 7.  They wish to continue with restorative care and no CODE BLUE status.    Continue restorative care    Pt is DNR/DNI    No tube feedings     OTHER RESOLVED/CHRONIC ISSUES:  Urinary retention, resolved  Retaining urine on 8/14. UA WNL, not worrisome for infection.  Requiring straight cath x1 on 8/20 PM.  Subsequently good UOP, no longer requiring  straight cath. No evidence of obstruction on PVR.      Dysphagia due to CVA, resolved  Seen by SLP and noted with dysphagia. Initially required some intermittent fluid boluses.   Was eventually able to advance to regular diet w/thin liquids.     Prolonged QTc.  EKG on 7/13 showed QTc 494.   Repeat EKG on 7/30 (while on levofloxacin) showed QTc stable at 475. Has since completed course of abx as below.  Had been monitoring on telemetry this stay. No concerning findings so tele was ultimately dc'd 8/19.     Anemia, suspect dilutional component, resolved  Hemoglobin   Date Value Ref Range Status   11/13/2022 10.8 (L) 11.7 - 15.7 g/dL Final   11/12/2022 10.9 (L) 11.7 - 15.7 g/dL Final   02/19/2019 9.0 (L) 11.8 - 15.5 g/dL Final   02/15/2019 9.8 (L) 11.7 - 15.7 g/dL Final   Hgb normal on admit. Hgb 11.5 on 7/16. No overt clinical signs of major bleeding.  Hgb subsequently stable and later normalized.     Diet: Advance Diet as Tolerated  Regular Diet Adult Thin Liquids (level 0) (Upright position, alert, and assist as needed)  Room Service    DVT Prophylaxis: Enoxaparin (Lovenox) subcutaneous- held   Carrasquillo Catheter: PRESENT, indication: Strict 1-2 Hour I&O, Strict 1-2 Hour I&O  Central Lines: PRESENT  PICC 10/31/22 Triple Lumen Right Brachial vein medial PICC line ordered STAT. Line placed w/o complication. Primary RN Updated.-Site Assessment: WDL  Cardiac Monitoring: None  Code Status: No CPR- Do NOT Intubate      Disposition Plan         The patient's care was discussed with the patiient.  Donal Rodriguez MD  Hospitalist Service  [facility]  Securely message with the Vocera Web Console (learn more here)  Text page via [employer] Paging/Directory     Clinically Significant Risk Factors        # Hypokalemia: Lowest K = 3 mmol/L (Ref range: 3.4-5.3) in last 2 days, will replace as needed     # Hypomagnesemia: Lowest Mg = 1.5 mg/dL (Ref range: 1.7-2.3) in last 2 days, will replace as needed   #  "Hypoalbuminemia: Lowest albumin = 1.3 g/dL (Ref range: 3.5-5.2) at 11/1/2022  4:19 AM, will monitor as appropriate          # Severe Obesity: Estimated body mass index is 47.7 kg/m  as calculated from the following:    Height as of this encounter: 1.676 m (5' 5.98\").    Weight as of this encounter: 134 kg (295 lb 6.7 oz).   # Severe Malnutrition: based on nutrition assessment        ______________________________________________________________________    Interval History   No new complaints    Data reviewed today: I reviewed all medications, new labs and imaging results over the last 24 hours. I personally reviewed no images or EKG's today.    Physical Exam   Vital Signs: Temp: 97.7  F (36.5  C) Temp src: Axillary BP: 116/63 Pulse: 79   Resp: 16 SpO2: 92 % O2 Device: None (Room air)    Weight: 295 lbs 6.66 oz  Constitutional:  Drowsy but she can be aroused.  She is not in any distress.  Respiratory: No increased work of breathing, good air exchange, clear to auscultation bilaterally, no crackles or wheezing  Cardiovascular: regular rate and rhythm, normal S1 and S2, no S3 or S4, and no murmur noted  Neuropsychiatric: General:  Calm and cooperative  Skin: legs are ace wrapped     Data   Recent Labs   Lab 11/13/22  0622 11/12/22  1641 11/12/22  0621 11/12/22  0230 11/11/22  1647 11/11/22  0630 11/10/22  0629 11/09/22  1542 11/09/22  0651   HGB 10.8* 10.9*  --   --   --   --   --   --   --    PLT  --   --   --   --   --   --  291  --   --    NA  --   --   --   --   --   --   --   --  141   POTASSIUM 3.0*  --  3.6 3.8 3.3* 3.2*  --   --  4.2  4.0   CHLORIDE  --   --   --   --   --   --   --   --  109   CO2  --   --   --   --   --   --   --   --  25   BUN  --   --   --   --   --   --   --   --  11   CR  --   --   --   --   --   --   --   --  0.46*   ANIONGAP  --   --   --   --   --   --   --   --  7   ASHIA  --   --   --   --   --   --   --   --  7.8*   GLC  --   --  102*  --  87 463*  --    < > 65*    < > = values in " this interval not displayed.     No results found for this or any previous visit (from the past 24 hour(s)).  Medications     dextrose 5% and 0.45% NaCl 75 mL/hr at 11/13/22 0113     - MEDICATION INSTRUCTIONS -         ARIPiprazole  10 mg Oral QPM     aspirin  325 mg Oral Daily     atorvastatin  40 mg Oral QPM     [Held by provider] enoxaparin ANTICOAGULANT  40 mg Subcutaneous Q12H     fludrocortisone  0.1 mg Oral Daily     magnesium sulfate  4 g Intravenous Once     miconazole   Topical BID     multivitamin w/minerals  1 tablet Oral Daily     [Held by provider] OLANZapine  2.5 mg Oral At Bedtime     polyethylene glycol  17 g Oral Daily     [Held by provider] potassium chloride  20 mEq Oral BID     potassium chloride  10 mEq Intravenous Q1H     senna-docusate  1-2 tablet Oral BID     sertraline  50 mg Oral Daily     sodium chloride (PF)  10-40 mL Intracatheter Q7 Days     sodium chloride (PF)  3 mL Intracatheter Q8H     [Held by provider] spironolactone  12.5 mg Oral Daily

## 2022-11-13 NOTE — PLAN OF CARE
Summary: CVA complicated by sepsis, deconditioning, PRECIOUS, ect  Date & Time: 11/12/22 1062-1556  Orientation: AOx2  Activity Level: Ax2 Lift  Fall Risk: Yes  Behavior & Aggression: Green  Pain Management: PRN oxy available  ABNL VS/O2: VSS on RA  ABNL Lab/BG: Phos: 1.9, 2.2  Diet: Regular (poor appetite)  Bowel/Bladder: Inc Bowl, Carrasquillo  Drains/Devices: Carrasquillo, triple lumen PICC  Tests/Procedures: OBGYN consult  Anticipated  DC Date: Pending LTC placement    Other Important Info: A&Ox2, anxious at times. VSS on RA. PICC infusing D5 + 0.45% NaCl at 75 ml/hr. Tolerating regular diet w/ thin liquids, but has a poor appetite. Assist of 2 with lift. Has had Hx of bloody vaginal discharge, ultrasound done. A lot more bloody discharge and clots today MD notified Hgb Stable OBGYN re-consulted, see note. SW following for LTC placement may have D&C with IUD placement later this week.

## 2022-11-13 NOTE — PLAN OF CARE
A&Ox2, anxious at times. VSS on RA. PICC infusing D5 + 0.45% NaCl at 75 ml/hr. Tolerating regular diet w/ thin liquids, but has a poor appetite. Assist of 2 with lift. T/R q2hr, refuses at times. Carrasquillo patent with cloudy deborah output. Had a BM with dark red vaginal discharge. Refuses some medications this shift. On K+, Mg, Ph protocols, completed Ph infusion this shift, AM draws. Zyprexa given. Pain in bilateral legs and feet, denied medication intervention. Discharge pending for placement.

## 2022-11-13 NOTE — PROGRESS NOTES
Gynecology Progress Note    Subjective:  Patient does not rouse for me this AM. Per her nurse, is very tired. Only oriented to self and seems confused. She is refusing to eat and refusing all medications this AM. He has noted small amount of dark blood while turning her.    Objective:  Vitals:    11/12/22 0718 11/12/22 1530 11/12/22 1927 11/13/22 0849   BP: (!) 145/91 98/65 91/78 116/63   BP Location: Left arm Right arm Right arm Left arm   Pulse: 80 62 85 79   Resp: 16 15 16 16   Temp: 98.4  F (36.9  C) 98  F (36.7  C) 97.5  F (36.4  C) 97.7  F (36.5  C)   TempSrc: Oral Axillary Axillary Axillary   SpO2: 96% 98% 98% 92%   Weight:       Height:         General: Lying in bed, asleep. Will not arouse to voice and light touch.    Assessment/Plan:  Cristy is a 77 year old female who has been hospitalized about 4 months with stroke, cellulitis, septic shock, and a flank wound. Remains inpatient awaiting placement. During her hospitalization she developed vaginal bleeding.  A pelvic ultrasound was performed which showed a thickened endometrial lining at 14 mm. A CT last week showed a normal uterus without mass.     On 11/12 Dr. Caldera spoke with patient's  Leo to ascertain if evaluation of endometrial cancer aligned with her goals of care. He requested that we evaluate her bleeding. In the interim, provera 10 mg daily (titrate up to TID until bleeding discontinues) was recommended.     I do not feel that she has medical decision making capacity. She is refusing all medical cares today (is not eating or taking medications). If wanting to proceed with evaluation of bleeding, would need to be done in the operating room. Would plan D&C and likely placement of Mirena IUD. She is not medically well enough to undergo treatment (surgery, chemotherapy) if endometrial cancer were to be diagnosed.     Recommend in person discussion with  and/or other family members regarding workup of postmenopausal bleeding,  treatment, and implications. If desiring workup, would need to obtain consent from surrogate decision maker/POA in person. Could consider add on case this week if cleared for surgery by primary team.     Please alert on call physician with OGI when family is present for further discussion in person. Otherwise, will reach out again to family over the phone to discuss goals and surgical planning.    Booker Yo MD  Obstetrics, Gynecology, Infertility  11/13/2022 10:59 AM

## 2022-11-14 NOTE — PROGRESS NOTES
[facility]    Hospitalist Progress Note      Assessment & Plan   Cristy Bailey is a 77 year old female with a PMHx significant for morbid obesity; HTN; and HLD; who presented to [facility] 7/13/2022 with complete left-sided paralysis, left-sided facial droop, and difficulty speaking. CTA head remarkable for right MCA occlusion. She was given tenecteplase and subsequently transferred to [facility] 7/13/2022 for thrombectomy.   Hospital stay complicated by development and progression of flank wound.  This subsequently required surgical debridement and placement of wound vac (8/2) that was subsequently removed (8/22).   Prolonged hospitalization related to difficulties finding LTC placement.     Acute right MCA ischemic stroke with edema and right to left midline shift - status post tenecteplase and subsequent right  MCA mechanical thrombectomy 7/13/22  Presented to [facility] 7/13 with complete paralysis, left-sided facial droop, and aphasia. Code stroke initiated. Head CT 7/13 showed signs of an evolving R MCA infarct. CTA head 7/13 showed a right carotid terminus occlusion, right middle cerebral artery M1 segment occlusion with poor opacification of the more distal right MCA branches/poor collateral flow. CTA neck 7/13 negative for acute occlusions. Pt given tenecteplase 7/13 at 13:11. Noted to be agitated and was subsequently intubated given need for procedure.   Subsequently transferred to [facility] 7/13/2022 where she underwent above procedure.   Extubated 7/14. Head CT 7/14 showed evolution of acute infarct involving the R MCA distribution with increased swelling, cortical effacement, and new mild right-to-left midline shift; questionable hypoattenuation involving the bilateral occipital lobes which could be artifactual.  Echo 7/14 showed LVEF 50%, grade 1 diastolic dysfunction.   Started ASA and atorvastatin per stroke team.   Repeat head CT 7/15 showed evolving R MCA  stroke with areas of edema, overall stable.    Continue  mg daily (indefinitely--discussed with neurology 11/14), atorvastatin 40 mg daily.    Goal SBP <140/90.     Monitored on telemetry for first several wks of hospital stay without abnl rhythm so no need for 30d cardiac event monitor at discharge    Follow up with [facility] within 2-3 months of discharge from hospital     Suspected meningioma left parietal region, incidentally seen on admission CT on 7/13/2022  Head CT 7/13 showed a calcified extra-axial mass overlying the left parietal region measuring 1.4 cm, potentially representing a meningioma.    Will need serial monitoring OP after discharge     Septic shock, suspect due to bilateral lower extremity cellulitis, left greater than right, resolved  10/30: Patient initially developed what appeared like a rash involving the left thigh.  10/31: Pt with worsened erythema involving both legs with the left being more intense associated with worsening leukocytosis and elevated CRP. Started on pip-tazo and ID consulted. Developed refractory hypotension which required transfer to the ICU where PICC was placed and started on pressors. Started on stress IV steroids. BC's 10/31 NGTD. UA 10/31 abnormal, but UC growing only >100K mixed urogenital garland.  Cortisol level normal.  11/2: CT abdomen and pelvis 11/2 showed no focal findings of infection. CT right thigh 11/2 showed no osteomyelitis; no evidence of a discrete fluid collection to suggest an abscess; cellulitis in the abdominal wall and both proximal calves. Pip-tazo changed to ceftriaxone and clindamycin.   11/3: Weaned off pressors.  11/4: Clindamycin stopped. Started fludrocortisone.  11/5: Cellulitis overall improved and transferred out of the intensive care unit    She has now completed antibiotic therapy.    Continuing low-dose fludrocortisone due to persistent hypotension    Continue lymphedema wraps to legs      Acute kidney injury, suspect prerenal related  to septic shock, resolved  Hypokalemia   Hypomagnesemia    Hypophosphatemia    Cr had been normal this hospitalization.  Cr increased to 2.08 on 10/31 associated with septic shock which was treated as noted.  CT abdomen/pelvis 11/2 negative for urinary obstructive process.  Cr normalized 11/2.     Continue to replace electrolytes as needed per protocol     Volume overload secondary to acute diastolic CHF exacerbation, resolved  History of essential hypertension  Not on/needing meds PTA.   As above, echo this stay showed EF 50% with grade I diastolic dysfunction; RV not well visualized but appeared mild-moderately dilated with global systolic function probably mildly reduced.   BPs were initially soft this stay and required IVFs. BPs improved.   Subsequently developed volume overload with elevated BP's and diuresed with IV furosemide, subsequently transitioned to PO furosemide.  Issues with recurrent hypokalemia on furosemide; noted often refusing potassium.  Started on lisinopril, but subsequently stopped due to soft BP's on furosemide.  Furosemide increased and spironolactone added 9/3.  Subsequently soft BP's and doses of BP meds held intermittently and subsequently developed septic shock 10/31 as noted.  Started on IV furosemide 11/4, now stopped    Continue to monitor volume status and use diuretics as needed     Anxiety  Cognitive impairment with intermittent situational confusion  Metabolic encephalopathy, resolved  On no psychiatric medications PTA.  Pt has issues with anxiety and intermittent situational confusion this hospitalization.    Seen by Psychiatry this hospitalization.  Started on multiple medications this hospitalization including quetiapine, olanzapine, sertraline, mirtazapine, PRN hydroxyzine, PRN lorazepam.  Medication regimen was adjusted/titrated. Mirtazapine, PRN lorazepam and PRN hydroxyzine subsequently stopped. See previous progress notes for more details.  *PRN ativan stopped  on 08/27/22 as leading to increased confusion. Hydroxyzine stopped as well.     Continue aripiprazole 10 mg qpm; olanzapine 2.5 mg at bedtime; sertraline 50 mg daily    Continue PRN olanzapine 5 mg q6h     Lower back/right flank wound/abscess, suspect due to pressure type injury status post surgical debridement on 7/30/2022 and placement of wound vac on 8/2/2022 (subsequently removed 8/22/2022)  Deep tissue pressure injury (DTPI) left medial thigh/groin region, hospital acquired, progressed to HAPI stage 2 pressure injury; related to purwick external catheter tubing (not the purwick device itself)  Pressure injuries, bilateral buttocks  Intertriginous dermatitis  Osteoarthritis    History of bilateral hip replacements  History of chronic neck and back pain  Patient has chronic back pain, reports having it over the last 2 years. MRI L-spine in 2018 showed multilevel degenerative changes with mild central stenosis.  Pt had significant back pain early on in hospital stay. Was requiring IV hydromorphone.  Dose had to be decreased dt somnolence.  During this stay, patient was noted with 5-6cm by 2-3cm wound with swelling/fluid/blistering in a fold of her lower back, did not appear infected; this seemed to be the source of her pain. Padded dressing placed and WOC RN ordered. Pain initially improved.   Was started on a course of amoxicillin-clavulanate on 7/24.   On 7/26, was re-evaluated by WOC RN. Wound appeared more erythematous and purulence expressed. Worsening with shear stress from lift. Procal <0.05, WBC WNL. Abx changed to IV clindamycin.  Wound cultures obtained. On 7/28, wound grew proteus and Staph aureus (MRSA neg). US neg for abscess.   Lost IV access on 7/28 so abx were changed to oral clindamycin and oral levaquin. IV access re-established but was continued on oral abx.  General surgery consulted, ultimately underwent excisional debridement of R flank abscess in OR on 7/30. Intraop cultures showed  polymicrobial growth with proteus mirabilis x2 strains (both pan-sensitive), corynebacterium striatum and enterococcus faecalis.  Wound vac placed per general surgery on 8/2.  ID consulted on 8/2 and abx narrowed to amoxicillin-clavulanate alone, completed an additional 5 days of treatment on 8/7.   Wound vac removed 8/22.  CT right thigh 11/2 negative for osteomyelitis or deep infection (checked for septic shock) as above.    Continue local wound cares per WOC RN, follows weekly    Continue topical antifungal     Continue PRN oxycodone; minimize opioids as able    Continue regular repositioning    Management of other pressure injuries per WOC recommendations     Severe malnutrition related to acute and chronic medical issues  Morbid obesity with significant weight loss this hospitalization  Nutritionist following. Appetite poor this stay, needing encouragement to take po.   There was some discussion previously about needing feeding support, but noted that patient does not want to have a feeding tube.  As of 10/19, has had 107 lb weight loss since admit (374 lbs on 7/14; 267 lbs 10/19); noted some contribution from diuresis for CHF.  Her appetite remains very poor.  She is refusing a feeding tube.    Continue to encourage oral intake    Continue supplements    IVF with dextrose, hope to wean off in next day 11/14     Vaginal bleeding  Abnormal endometrial thickening on US  Pt has had vaginal bleeding intermittently this hospitalization.  Pelvic US 10/8 showed abnormal endometrial thickening to 14 mm.   Gyn consulted 10/9 and recommend outpatient evaluation.  The initial plan was for an outpatient evaluation but on November 11 the patient developed further vaginal bleeding.      Continue to hold enoxaparin DVT prophylaxis     [facility]I gyn following, they would like to discuss with partner Leo for consent before proceeding with procedure     Dyslipidemia  FLP this stay showed tot cholest 163, HDL 47, LDL 91, .    Started on atorvastatin 40 mg daily    Continue atorvastatin 40 mg daily for secondary stroke prevention     Chronic bilateral lower extremity edema with chronic venous stasis dermatitis and chronic skin changes  History of left lower extremity cellulitis   Was hospitalized in 11/2021 for sepsis dt to pneumonia and LLE cellulitis.   During this stay, BLE noted to be patty and edematous, no unilateral leg swelling appreciated; LLE had patchy areas of erythema, no fluctuance, no painful areas with palpation; legs warm to touch. Lymphedema consulted.     Continue leg elevation, lymphedema wraps     Constipation    Continue scheduled senna-docusate, polyethylene glycol, PRN bisacodyl     Suspected sleep apnea  O2 drop to mid 80s overnight 8/14. Briefly placed on 4LPM, which she then removed and then slept okay with sats in 90s thereafter.    Consider sleep study as outpatient     Goals of care  Failure to thrive  During this hospitalization, discussed concern about overall prognosis, with her eating 0-25% of her meals and nutrition recommendations for alternative forms of nutrition; at that time, she had lost 70 lbs since admit (some of that was probably related to diuresis and some related to variable weights in hospital, but still significant).   Patient and significant other/HCA Leo, reiterated her wishes for DNR/DNI and no feeding tubes.  Palliative care met with Leo 9/28 and he did not want hospice.  I met with Leo and the patient's daughter on November 7.  They wish to continue with restorative care and no CODE BLUE status.    Continue restorative care    Pt is DNR/DNI    No tube feedings     OTHER RESOLVED/CHRONIC ISSUES:  Urinary retention, resolved  Retaining urine on 8/14. UA WNL, not worrisome for infection.  Requiring straight cath x1 on 8/20 PM.  Subsequently good UOP, no longer requiring straight cath. No evidence of obstruction on PVR.      Dysphagia due to CVA, resolved  Seen by SLP and noted  "with dysphagia. Initially required some intermittent fluid boluses.   Was eventually able to advance to regular diet w/thin liquids.     Prolonged QTc.  EKG on 7/13 showed QTc 494.   Repeat EKG on 7/30 (while on levofloxacin) showed QTc stable at 475. Has since completed course of abx as below.  Had been monitoring on telemetry this stay. No concerning findings so tele was ultimately dc'd 8/19.     Anemia, suspect dilutional component, resolved  Hgb normal on admit. Hgb 11.5 on 7/16. No overt clinical signs of major bleeding.  Hgb subsequently stable and later normalized.      Clinically Significant Risk Factors        # Hypokalemia: Lowest K = 3 mmol/L (Ref range: 3.4-5.3) in last 2 days, will replace as needed     # Hypomagnesemia: Lowest Mg = 1.5 mg/dL (Ref range: 1.7-2.3) in last 2 days, will replace as needed   # Hypoalbuminemia: Lowest albumin = 1.3 g/dL (Ref range: 3.5-5.2) at 11/1/2022  4:19 AM, will monitor as appropriate          # Severe Obesity: Estimated body mass index is 51.65 kg/m  as calculated from the following:    Height as of this encounter: 1.676 m (5' 6\").    Weight as of this encounter: 145.2 kg (320 lb).   # Severe Malnutrition: based on nutrition assessment        DVT Prophylaxis: Pneumatic Compression Devices  Code Status: No CPR- Do NOT Intubate   unknown     Melissa Howe, DO  Hospitalist Service  [facility]  Securely message with the Vocera Web Console (learn more here)  Text Page (7am - 6pm) via [employer] Paging/Directory      Interval History    Patient seen and examined. She is doing okay. Has some pain in her legs. Her partner is at bedside, but isn't staying long. Convinced her to try 2 bites of a reeses cup while I was in the room. Discussed hopes to stop continuous IVF with dextrose in upcoming days based on intake. Reviewed case with neurology PA, patient is technically overdue for outpatient follow-up. Plan to continue full dose ASA indefinitely. Plan for " follow-up with neuro in a few months after discharge.  Spent 40 minutes with >50% time spent evaluating patient, reviewing care plan since last saw her (august), reviewing specialist recommendations, discussing with neurology.    -Data reviewed today: I reviewed all new labs and imaging results over the last 24 hours. I personally reviewed no images or EKG's today.    Physical Exam   Temp: 97.4  F (36.3  C) Temp src: Axillary BP: 133/74 Pulse: 87   Resp: 16 SpO2: 97 % O2 Device: None (Room air)    Vitals:    11/06/22 0601 11/07/22 0545 11/14/22 0900   Weight: 134.2 kg (295 lb 13.7 oz) 134 kg (295 lb 6.7 oz) 145.2 kg (320 lb)     Vital Signs with Ranges  Temp:  [97.4  F (36.3  C)-97.5  F (36.4  C)] 97.4  F (36.3  C)  Pulse:  [80-87] 87  Resp:  [16] 16  BP: (115-133)/(65-74) 133/74  SpO2:  [93 %-97 %] 97 %  I/O last 3 completed shifts:  In: -   Out: 900 [Urine:900]    Constitutional: Awake, alert, cooperative, no apparent distress  Respiratory: Clear to auscultation bilaterally, no crackles or wheezing  Cardiovascular: Regular rate and rhythm, normal S1 and S2, and no murmur noted  GI: Normal bowel sounds, soft, non-distended, non-tender, obese  Skin/Integumen: No rashes, no cyanosis, +3 lower extremity edema, right flank with mepilex dressing in place  Other: Able to take 2 bites of reeses cup.    Medications     dextrose 5% and 0.45% NaCl 50 mL/hr at 11/14/22 0908     - MEDICATION INSTRUCTIONS -         ARIPiprazole  10 mg Oral QPM     aspirin  325 mg Oral Daily     atorvastatin  40 mg Oral QPM     [Held by provider] enoxaparin ANTICOAGULANT  40 mg Subcutaneous Q12H     fludrocortisone  0.1 mg Oral Daily     miconazole   Topical BID     multivitamin w/minerals  1 tablet Oral Daily     [Held by provider] OLANZapine  2.5 mg Oral At Bedtime     polyethylene glycol  17 g Oral Daily     [Held by provider] potassium chloride  20 mEq Oral BID     senna-docusate  1-2 tablet Oral BID     sertraline  50 mg Oral Daily      sodium chloride (PF)  10-40 mL Intracatheter Q7 Days     sodium chloride (PF)  3 mL Intracatheter Q8H     sodium phosphate  15 mmol Intravenous Once     [Held by provider] spironolactone  12.5 mg Oral Daily       Data   Recent Labs   Lab 11/14/22  0608 11/13/22 2001 11/13/22 0622 11/12/22  1641 11/12/22  0621 11/12/22  0230 11/11/22  1647 11/11/22  0630 11/10/22  0629 11/09/22  1542 11/09/22  0651   HGB  --   --  10.8* 10.9*  --   --   --   --   --   --   --    PLT  --   --   --   --   --   --   --   --  291  --   --    NA  --   --   --   --   --   --   --   --   --   --  141   POTASSIUM 3.5 3.7 3.0*  --  3.6   < > 3.3* 3.2*  --   --  4.2  4.0   CHLORIDE  --   --   --   --   --   --   --   --   --   --  109   CO2  --   --   --   --   --   --   --   --   --   --  25   BUN  --   --   --   --   --   --   --   --   --   --  11   CR  --   --   --   --   --   --   --   --   --   --  0.46*   ANIONGAP  --   --   --   --   --   --   --   --   --   --  7   ASHIA  --   --   --   --   --   --   --   --   --   --  7.8*   GLC  --   --   --   --  102*  --  87 463*  --    < > 65*    < > = values in this interval not displayed.       No results found for this or any previous visit (from the past 24 hour(s)).

## 2022-11-14 NOTE — PROGRESS NOTES
Gynecology Progress Note    Attempted to reach patient's , Leo, this AM. No answer.     Called patient's daughter, Kaylynn. Per her report she is next of kin but Leo is listed on her medical directive. We discussed her mother's clinical status of small amounts of vaginal bleeding, often refusing cares (eating, meds, etc.). Discussed that an evaluation of her bleeding would involve a trip to the operating room for biopsy and possible IUD placement. I do not feel that this could be done bedside due to obesity and patient compliance. Discussed that we would need consent from a surrogate decision maker before proceeding with this, as Cristy is not able to provide informed consent on my assessment. Also reviewed that if we were to diagnose endometrial cancer during her workup, she would not be a candidate for surgery and/or chemotherapy at this time due to her ongoing medical issues. She expresses understanding. She will defer the decision to Leo.     I tried calling Leo again to discuss but again no answer (phone rings without voicemail). Per Kaylynn, Leo often visits at the hospital every other day for an hour or so but is not sure of his visiting plans this week due to the weather.     I feel that an in person discussion with Leo would be best at this time to obtain appropriate informed consent for this procedure prior to scheduling, though could be done over the phone if absolutely needed. I will attempt to reach him by phone again this afternoon. If he visits the patient in the hospital, please page the OGI on call physician so that we may speak with him. Will hold off on scheduling this surgery for now.     Booker Yo MD  Obstetrics, Gynecology, and Infertility  11/14/22    Leo's phone: [phone]  Kaylynn's phone: [phone]    Addendum 1530: Attempted to call Leo again with no answer.

## 2022-11-14 NOTE — PROGRESS NOTES
"SPIRITUAL HEALTH SERVICES Progress Note  [facility] General Surgery    Saw ptnt per follow-up. She told me \"I'm cold\" and I put the covers over her. She told me \"My mouth is dry\" and I gave her water. We talked about her kids and the weather. I provided compassionate touch, emotional support, and prayer.     SHS remain available.     DILMA BeckDiv  Chaplain Resident   [phone]   "

## 2022-11-14 NOTE — PLAN OF CARE
Goal Outcome Evaluation:      Plan of Care Reviewed With: spouse    Pt alert to self, can be forgetful sometimes. VSS on RA. Often denies care like repo and meds. On electrolytes protocol, phosphorus replaced via IV, recheck tmrw AM. IV infusing 50ml/hr. Poor oral intake. Carrasquillo in place

## 2022-11-14 NOTE — PLAN OF CARE
A&Ox2, anxious at times. VSS on RA. PICC infusing D5 + 0.45% NaCl at 75 ml/hr. Tolerating regular diet w/ thin liquids, but has a poor appetite. Assist of 2 with lift. T/R q2hr, refuses at times. Carrasquillo patent with cloudy deborah output. Dark red vaginal discharge noted. Refuses some medications this shift. On K+, Mg, Ph protocols, completed Mg infusion this shift, AM draws. Pain in bilateral legs and feet, denied medication intervention. Discharge pending for placement.

## 2022-11-15 NOTE — PLAN OF CARE
Goal Outcome Evaluation:  Pt is alert, oriented to self. AVSS on room air. Appears comfortable. Needs encouragement and reminders to turn and reposition. Carrasquillo patent with good urine output. Labs drawn; results pending.

## 2022-11-15 NOTE — PROGRESS NOTES
Patient alert, oriented to self only. Also non compliant with cares and doesn't want to be moved. Staff turned patient at 2250, no new skin concerns noted. Mepilex dressings on right lower back and coccyx. Carrasquillo in and draining well, cares done. Patient has severe edema in bilateral lower legs, angles, and feet, both elevated on pillows. Staff assured patient comfort, will keep monitoring.

## 2022-11-16 NOTE — PROVIDER NOTIFICATION
Low urine output for few days and low sugar, poor oral intake with soft BP. New IVF ordered w Dextrose.

## 2022-11-16 NOTE — PROGRESS NOTES
Gynecology progress note.    Spoke to RN this morning to get update.  He reports VB very minimal.  Asked him to page me when pt's  Leo visited so I could meet with him, but it appears he did not visit today.  I attempted to call Leo over noon hour, no answer.   As per Dr. Yo's note, we would really like to discuss Cristy's management further with him prior to proceeding with any invasive procedures.  Please reach out to OGI physician on call between hours of 7 am - 5 pm when Leo is here visiting so we can try to meet with him.  If we are unable to reach him this week, we would appreciate being involved with any upcoming care conferences to have these discussions.    Mela Roca MD  Obstetrics, Gynecology, and Infertility PA

## 2022-11-16 NOTE — PROGRESS NOTES
[facility]    Hospitalist Progress Note      Assessment & Plan   Cristy Bailey is a 77 year old female with a PMHx significant for morbid obesity; HTN; and HLD; who presented to [facility] 7/13/2022 with complete left-sided paralysis, left-sided facial droop, and difficulty speaking. CTA head remarkable for right MCA occlusion. She was given tenecteplase and subsequently transferred to [facility] 7/13/2022 for thrombectomy.   Hospital stay complicated by development and progression of flank wound.  This subsequently required surgical debridement and placement of wound vac (8/2) that was subsequently removed (8/22).   Prolonged hospitalization related to difficulties finding LTC placement.     Acute right MCA ischemic stroke with edema and right to left midline shift - status post tenecteplase and subsequent right  MCA mechanical thrombectomy 7/13/22  Presented to [facility] 7/13 with complete paralysis, left-sided facial droop, and aphasia. Code stroke initiated. Head CT 7/13 showed signs of an evolving R MCA infarct. CTA head 7/13 showed a right carotid terminus occlusion, right middle cerebral artery M1 segment occlusion with poor opacification of the more distal right MCA branches/poor collateral flow. CTA neck 7/13 negative for acute occlusions. Pt given tenecteplase 7/13 at 13:11. Noted to be agitated and was subsequently intubated given need for procedure.   Subsequently transferred to [facility] 7/13/2022 where she underwent above procedure.   Extubated 7/14. Head CT 7/14 showed evolution of acute infarct involving the R MCA distribution with increased swelling, cortical effacement, and new mild right-to-left midline shift; questionable hypoattenuation involving the bilateral occipital lobes which could be artifactual.  Echo 7/14 showed LVEF 50%, grade 1 diastolic dysfunction.   Started ASA and atorvastatin per stroke team.   Repeat head CT 7/15 showed evolving R MCA  stroke with areas of edema, overall stable.    Continue  mg daily (indefinitely--discussed with neurology 11/14), atorvastatin 40 mg daily.    Goal SBP <140/90.     Monitored on telemetry for first several wks of hospital stay without abnl rhythm so no need for 30d cardiac event monitor at discharge    Follow up with [facility] within 2-3 months of discharge from hospital     Suspected meningioma left parietal region, incidentally seen on admission CT on 7/13/2022  Head CT 7/13 showed a calcified extra-axial mass overlying the left parietal region measuring 1.4 cm, potentially representing a meningioma.    Will need serial monitoring OP after discharge     Septic shock, suspect due to bilateral lower extremity cellulitis, left greater than right, resolved  10/30: Patient initially developed what appeared like a rash involving the left thigh.  10/31: Pt with worsened erythema involving both legs with the left being more intense associated with worsening leukocytosis and elevated CRP. Started on pip-tazo and ID consulted. Developed refractory hypotension which required transfer to the ICU where PICC was placed and started on pressors. Started on stress IV steroids. BC's 10/31 NGTD. UA 10/31 abnormal, but UC growing only >100K mixed urogenital garland.  Cortisol level normal.  11/2: CT abdomen and pelvis 11/2 showed no focal findings of infection. CT right thigh 11/2 showed no osteomyelitis; no evidence of a discrete fluid collection to suggest an abscess; cellulitis in the abdominal wall and both proximal calves. Pip-tazo changed to ceftriaxone and clindamycin.   11/3: Weaned off pressors.  11/4: Clindamycin stopped. Started fludrocortisone.  11/5: Cellulitis overall improved and transferred out of the intensive care unit, completed abx 11/10    Monitor off fludrocortisone starting 11/16, added midodrine 2.5mg TID with meals    Continue lymphedema wraps to legs      Acute kidney injury, suspect prerenal related to  septic shock, resolved  Hypokalemia   Hypomagnesemia    Hypophosphatemia    Cr had been normal this hospitalization.  Cr increased to 2.08 on 10/31 associated with septic shock which was treated as noted.  CT abdomen/pelvis 11/2 negative for urinary obstructive process.  Cr normalized 11/2.     Continue to replace electrolytes as needed per protocol     Volume overload secondary to acute diastolic CHF exacerbation, resolved  History of essential hypertension  Not on/needing meds PTA.   As above, echo this stay showed EF 50% with grade I diastolic dysfunction; RV not well visualized but appeared mild-moderately dilated with global systolic function probably mildly reduced.   BPs were initially soft this stay and required IVFs. BPs improved.   Subsequently developed volume overload with elevated BP's and diuresed with IV furosemide, subsequently transitioned to PO furosemide.  Issues with recurrent hypokalemia on furosemide; noted often refusing potassium.  Started on lisinopril, but subsequently stopped due to soft BP's on furosemide.  Furosemide increased and spironolactone added 9/3.  Subsequently soft BP's and doses of BP meds held intermittently and subsequently developed septic shock 10/31 as noted.  Started on IV furosemide 11/4, now stopped    Difficult to add diuretics with her lower BPs and poor nutrition     Anxiety  Cognitive impairment with intermittent situational confusion  Metabolic encephalopathy, resolved  On no psychiatric medications PTA.  Pt has issues with anxiety and intermittent situational confusion this hospitalization.    Seen by Psychiatry this hospitalization.  Started on multiple medications this hospitalization including quetiapine, olanzapine, sertraline, mirtazapine, PRN hydroxyzine, PRN lorazepam.  Medication regimen was adjusted/titrated. Mirtazapine, PRN lorazepam and PRN hydroxyzine subsequently stopped. See previous progress notes for more details.  *PRN ativan stopped  on 08/27/22 as leading to increased confusion. Hydroxyzine stopped as well.     Continue aripiprazole 10 mg qpm; olanzapine 2.5 mg at bedtime; sertraline 50 mg daily    Continue PRN olanzapine 5 mg q6h     Lower back/right flank wound/abscess, suspect due to pressure type injury status post surgical debridement on 7/30/2022 and placement of wound vac on 8/2/2022 (subsequently removed 8/22/2022)  Deep tissue pressure injury (DTPI) left medial thigh/groin region, hospital acquired, progressed to HAPI stage 2 pressure injury; related to purwick external catheter tubing (not the purwick device itself)  Pressure injuries, bilateral buttocks  Intertriginous dermatitis  Osteoarthritis    History of bilateral hip replacements  History of chronic neck and back pain  Patient has chronic back pain, reports having it over the last 2 years. MRI L-spine in 2018 showed multilevel degenerative changes with mild central stenosis.  Pt had significant back pain early on in hospital stay. Was requiring IV hydromorphone.  Dose had to be decreased dt somnolence.  During this stay, patient was noted with 5-6cm by 2-3cm wound with swelling/fluid/blistering in a fold of her lower back, did not appear infected; this seemed to be the source of her pain. Padded dressing placed and WOC RN ordered. Pain initially improved.   Was started on a course of amoxicillin-clavulanate on 7/24.   On 7/26, was re-evaluated by WOC RN. Wound appeared more erythematous and purulence expressed. Worsening with shear stress from lift. Procal <0.05, WBC WNL. Abx changed to IV clindamycin.  Wound cultures obtained. On 7/28, wound grew proteus and Staph aureus (MRSA neg). US neg for abscess.   Lost IV access on 7/28 so abx were changed to oral clindamycin and oral levaquin. IV access re-established but was continued on oral abx.  General surgery consulted, ultimately underwent excisional debridement of R flank abscess in OR on 7/30. Intraop cultures showed  polymicrobial growth with proteus mirabilis x2 strains (both pan-sensitive), corynebacterium striatum and enterococcus faecalis.  Wound vac placed per general surgery on 8/2.  ID consulted on 8/2 and abx narrowed to amoxicillin-clavulanate alone, completed an additional 5 days of treatment on 8/7.   Wound vac removed 8/22.  CT right thigh 11/2 negative for osteomyelitis or deep infection (checked for septic shock) as above.    Continue local wound cares per WOC RN, follows weekly    Continue topical antifungal     Continue PRN oxycodone; minimize opioids as able    Continue regular repositioning    Management of other pressure injuries per WOC recommendations     Severe malnutrition related to acute and chronic medical issues  Morbid obesity with significant weight loss this hospitalization  Hypoglycemia  Nutritionist following. Appetite poor this stay, needing encouragement to take po.   There was some discussion previously about needing feeding support, but noted that patient does not want to have a feeding tube.  As of 10/19, has had 107 lb weight loss since admit (374 lbs on 7/14; 267 lbs 10/19); noted some contribution from diuresis for CHF.  Her appetite remains very poor.  She is refusing a feeding tube.    Continue to encourage oral intake    Continue supplements    Start calori counts    Glucose checks BID and HS    IVF D5 NS with 20meq KCl @50ml/hr started 11/16     Vaginal bleeding  Abnormal endometrial thickening on US  Pt has had vaginal bleeding intermittently this hospitalization.  Pelvic US 10/8 showed abnormal endometrial thickening to 14 mm.   Gyn consulted 10/9 and recommend outpatient evaluation.  The initial plan was for an outpatient evaluation but on November 11 the patient developed further vaginal bleeding.      Continue to hold enoxaparin DVT prophylaxis     [facility] gyn following, they would like to discuss with partner Leo for consent before proceeding with procedure     Dyslipidemia  FLP  this stay showed tot cholest 163, HDL 47, LDL 91, .   Started on atorvastatin 40 mg daily    Continue atorvastatin 40 mg daily for secondary stroke prevention     Chronic bilateral lower extremity edema with chronic venous stasis dermatitis and chronic skin changes  History of left lower extremity cellulitis   Was hospitalized in 11/2021 for sepsis dt to pneumonia and LLE cellulitis.   During this stay, BLE noted to be patty and edematous, no unilateral leg swelling appreciated; LLE had patchy areas of erythema, no fluctuance, no painful areas with palpation; legs warm to touch. Lymphedema consulted.     Continue leg elevation, lymphedema wraps     Constipation    Continue scheduled senna-docusate, polyethylene glycol, PRN bisacodyl     Suspected sleep apnea  O2 drop to mid 80s overnight 8/14. Briefly placed on 4LPM, which she then removed and then slept okay with sats in 90s thereafter.    Consider sleep study as outpatient     Goals of care  Failure to thrive  During this hospitalization, discussed concern about overall prognosis, with her eating 0-25% of her meals and nutrition recommendations for alternative forms of nutrition; at that time, she had lost 70 lbs since admit (some of that was probably related to diuresis and some related to variable weights in hospital, but still significant).   Patient and significant other/HCA Leo, reiterated her wishes for DNR/DNI and no feeding tubes.  Palliative care met with Leo 9/28 and he did not want hospice.  I met with Leo and the patient's daughter on November 7.  They wish to continue with restorative care and no CODE BLUE status.    Continue restorative care    Pt is DNR/DNI    No tube feedings     OTHER RESOLVED/CHRONIC ISSUES:  Urinary retention, resolved  Retaining urine on 8/14. UA WNL, not worrisome for infection.  Requiring straight cath x1 on 8/20 PM.  Subsequently good UOP, no longer requiring straight cath. No evidence of obstruction on PVR.  "     Dysphagia due to CVA, resolved  Seen by SLP and noted with dysphagia. Initially required some intermittent fluid boluses.   Was eventually able to advance to regular diet w/thin liquids.     Prolonged QTc.  EKG on 7/13 showed QTc 494.   Repeat EKG on 7/30 (while on levofloxacin) showed QTc stable at 475. Has since completed course of abx as below.  Had been monitoring on telemetry this stay. No concerning findings so tele was ultimately dc'd 8/19.     Anemia, suspect dilutional component, resolved  Hgb normal on admit. Hgb 11.5 on 7/16. No overt clinical signs of major bleeding.  Hgb subsequently stable and later normalized.      Clinically Significant Risk Factors              # Hypoalbuminemia: Lowest albumin = 1.3 g/dL (Ref range: 3.5-5.2) at 11/1/2022  4:19 AM, will monitor as appropriate          # Severe Obesity: Estimated body mass index is 51.89 kg/m  as calculated from the following:    Height as of this encounter: 1.676 m (5' 6\").    Weight as of this encounter: 145.8 kg (321 lb 7.6 oz).   # Severe Malnutrition: based on nutrition assessment        DVT Prophylaxis: Pneumatic Compression Devices  Code Status: No CPR- Do NOT Intubate   unknown     Melissa Howe, DO  Hospitalist Service  [facility]  Securely message with the Vocera Web Console (learn more here)  Text Page (7am - 6pm) via [facility] Paging/Directory      Interval History    Patient seen and examined. She is a little sleepy. Tried to discuss goals of care, she is clear she does not want a feeding tube and wants to eat. Discussed hospice briefly, she does not have an opinion. Called Leo, unable to leave a message. Spoke with daughter Kaylynn via phone x35 minutes, reviewing goals of care, overall hospitalization.  Spent 55 minutes with >50% time spent evaluating patient, reviewing chart, adjusting orders, discussing care plan with daughter Kaylynn and with SW.    -Data reviewed today: I reviewed all new labs and imaging " results over the last 24 hours. I personally reviewed no images or EKG's today.    Physical Exam   Temp: 96.9  F (36.1  C) Temp src: Oral BP: 91/49 Pulse: 87   Resp: 18 SpO2: 95 % O2 Device: None (Room air)    Vitals:    11/07/22 0545 11/14/22 0900 11/16/22 0616   Weight: 134 kg (295 lb 6.7 oz) 145.2 kg (320 lb) 145.8 kg (321 lb 7.6 oz)     Vital Signs with Ranges  Temp:  [96.9  F (36.1  C)-98.2  F (36.8  C)] 96.9  F (36.1  C)  Pulse:  [87-89] 87  Resp:  [16-18] 18  BP: ()/(48-55) 91/49  SpO2:  [95 %-98 %] 95 %  I/O last 3 completed shifts:  In: -   Out: 425 [Urine:425]    Constitutional: Drowsy, cooperative, no apparent distress  Respiratory: Clear to auscultation bilaterally, no crackles or wheezing  Cardiovascular: Regular rate and rhythm, normal S1 and S2, and no murmur noted  GI: Normal bowel sounds, soft, non-distended, non-tender, obese  Skin/Integumen: No rashes, no cyanosis, ++3 lower extremity edema  Other:     Medications     - MEDICATION INSTRUCTIONS -         ARIPiprazole  10 mg Oral QPM     aspirin  325 mg Oral Daily     atorvastatin  40 mg Oral QPM     [Held by provider] enoxaparin ANTICOAGULANT  40 mg Subcutaneous Q12H     miconazole   Topical BID     multivitamin w/minerals  1 tablet Oral Daily     OLANZapine  2.5 mg Oral At Bedtime     polyethylene glycol  17 g Oral Daily     [Held by provider] potassium chloride  20 mEq Oral BID     senna-docusate  1-2 tablet Oral BID     sertraline  50 mg Oral Daily     sodium chloride (PF)  10-40 mL Intracatheter Q7 Days     sodium chloride (PF)  3 mL Intracatheter Q8H     [Held by provider] spironolactone  12.5 mg Oral Daily       Data   Recent Labs   Lab 11/16/22  0556 11/16/22  0155 11/15/22  2128 11/15/22  1734 11/15/22  0557 11/14/22  1959 11/14/22  0608 11/13/22 2001 11/13/22 0622 11/12/22  1641 11/11/22  0630 11/10/22  0629   WBC  --   --   --   --  5.7  --   --   --   --   --   --   --    HGB  --   --   --   --  10.7*  --   --   --  10.8* 10.9*   --   --    MCV  --   --   --   --  85  --   --   --   --   --   --   --    PLT  --   --   --   --  253  --   --   --   --   --   --  291   POTASSIUM 3.4  --   --   --   --   --  3.5 3.7 3.0*  --    < >  --    GLC  --  81 71 89  --    < >  --   --   --   --    < >  --     < > = values in this interval not displayed.       No results found for this or any previous visit (from the past 24 hour(s)).

## 2022-11-16 NOTE — PROGRESS NOTES
[facility] Nurse Inpatient Assessment     Consulted for: Right lower back wound (Stage 3 HAPI)     Areas Assessed:      Areas visualized during today's visit: right skin fold, left medial thigh      Wound location: Right lower back in waist crease    Photo date: 11-16-22 11-9-22      Wound due to: Hospital Acquired Pressure injury stage 3   Stage: Unstageable 7/28, Following surgical debridement 7/30 Stage 3 Pressure Injury and Moisture Associated Skin Damage (MASD), suspect intertriginous pressure, appears to be deeper tissue damage within a crease, possible shear component from lift.    Wound history/plan of care: previously a large deep wound, vac therapy was used for a few weeks and wound filled in well. Previous hypertrophic tissue has receded.  Will continue current POC with Melina.  Wound base: pink-red moist tissue     Palpation of the wound bed: normal       Drainage: small      Description of drainage: serosanguinous       Measurements (length x width x depth, in cm) 1 x 9 x 0.2cm with small skin bridge in middle     Tunneling N/A      Undermining NA  Periwound skin:  Previous rash somewhat, some maceration and peeling and erythema continues 11/16      Color: pink blanchable erythema      Temperature: normal to warm and sweaty  Odor: none    Pain: tender   Pain intervention: slow and gentle cares  Treatment goal: Heal   STATUS: improving slowly  Supplies ordered: at bedside     Treatment Plan:     Right posterior waist crease: Daily  1. Cleanse with wound cleanser or Vashe ( #[id number]).  2. Apply 3M No Sting barrier to periwound skin, let dry.  3. Apply Melina (# [id number]) to wound bed (cut pieces to fit).  (This is a silver collagen dressing that may partially dissolve into wound).   4. Cover with Mepilex, conforming carefully to skin contours.        Orders: Reviewed    RECOMMEND PRIMARY TEAM ORDER: None, at this time  Education provided: importance of  repositioning, plan of care, wound progress, Moisture management and Off-loading pressure  Discussed plan of care with: Patient and Nurse  WOC nurse follow-up plan: weekly  Notify WOC if wound(s) deteriorate.  Nursing to notify the Provider(s) and re-consult the WOC Nurse if new skin concern.    DATA:     Current support surface: Bariatric Low air loss mattress    Containment of urine/stool: Incontinence Protocol and Incontinent pad in bed  BMI: Body mass index is 51.89 kg/m .   Active diet order: Orders Placed This Encounter      Advance Diet as Tolerated      Regular Diet Adult Thin Liquids (level 0) (Upright position, alert, and assist as needed)     Output: I/O last 3 completed shifts:  In: -   Out: 425 [Urine:425]     Labs:   Recent Labs   Lab 11/15/22  0557 11/12/22  1641 11/12/22  0009   HGB 10.7*   < >  --    WBC 5.7  --   --    A1C  --   --  5.4    < > = values in this interval not displayed.     Pressure injury risk assessment:   Sensory Perception: 3-->slightly limited  Moisture: 4-->rarely moist  Activity: 1-->bedfast  Mobility: 2-->very limited  Nutrition: 3-->adequate  Friction and Shear: 2-->potential problem  Demetrio Score: 15    Talia Linda RN CWN   Dept. Pager: [phone]  Dept. Office Number: [phone]

## 2022-11-16 NOTE — PLAN OF CARE
Goal Outcome Evaluation:    Patient alert to self. No acute changes. Bilateral limb edema, compression socks applied. Refuses turn and repo. Pt educated and frequent reorientation. IV discontinued, saline locked. Poor oral intake

## 2022-11-16 NOTE — PROGRESS NOTES
Care Management Follow Up    Length of Stay (days): 126    Expected Discharge Date:  when placement is found     Concerns to be Addressed:       Patient plan of care discussed at interdisciplinary rounds: Yes    Anticipated Discharge Disposition: LTC  Anticipated Discharge Services:    Anticipated Discharge DME:      Patient/family educated on Medicare website which has current facility and service quality ratings:  yes  Education Provided on the Discharge Plan:    Patient/Family in Agreement with the Plan: yes    Referrals Placed by CM/SW:    Private pay costs discussed: Not applicable    Additional Information:  TONY called patients daughter to follow up on MA application. Patients daughter stated she is waiting to hear from [facility] of application status.    TONY reviewed financial counselors notes:      8/9 MA application sent  10/1 MA application processed  10/3 [facility] requesting:   -pt's bank statements for all accounts 6-1-2022 to present  -copy of pt's unpaid medical bills  -vehicle title-2 vehicles  -cremation policy from [employer],  -verification of gross amount pension (separate from bank statement),   -front and back of [employer] card, and premiums for [employer] plan    10/6 and forth    FC is recommending family meets with Elderly Law  due to patient being over assist limit. Patients Kaylynn agrawal is deferring actions to patients significant other Leo. Leo declined referral for Elderly Law .    FC is no longer following MA LTC application as it has been submitted to [facility] and is pending. Patients S/O has [facility] contact information, and case number.     TONY contacted Leo via house phone, Leo stated he will be at the hospital today around 1300. SW to meet with Leo.     TONY returned phone call to patients Kaylynn agrawal. TONY relayed information that [facility] is requesting and communicated with Kaylynn that either way patient will need to spend down to  qualify for MA. Kaylynn voiced understanding that a spend down is necessary. Kaylynn had questions on patients medical status. SW to relay questions during rounds.     TONY emailed Kaylynn the requested information.     Addendum: TONY went back to room to meet with Leo. Patient stated Leo has left for the day and plans to come back tomorrow at 1200.    Peggy Hair, MSW, LGSW

## 2022-11-16 NOTE — PLAN OF CARE
Goal Outcome Evaluation: Pt is alert, oriented to self, place, not time. AVSS on room air. Pain in BLE managed with Prn oxy and tylenol, with relief reported. Compression stockings removed at 2300, legs and feet with severe edema noted. Needs encouragement and reminders to turn and reposition, refuses at times. Small amounts of vaginal bleeding noted. BG check at HS was 71, juice given and increased to 81. Pt eating very little. [place] patent with good urine output. Discharge pending placement.       Plan of Care Reviewed With: patient    Overall Patient Progress: no changeOverall Patient Progress: no change

## 2022-11-16 NOTE — PROGRESS NOTES
Isis Muniz is a 77 year old female who has been hospitalized about 4 months with stroke, cellulitis, septic shock, and a flank wound.  During her hospitalization she developed vaginal bleeding.  A pelvic ultrasound was performed which showed a thickened endometrial lining at 14 mm.   A CT last week showed a normal uterus without mass.    I was able to connect with Dulce Maria over the phone. He was in her room for a short time but I had a delivery at that time on labor and delivery. I reviewed with him the risks of the procedure of an endometrial biopsy with a placement of an IUD at the same time. He is aware that she is high risk and I can not predict what might happen in the OR with hemorrhage, complications of anesthesia as well as another stroke or even death as a possibility. I signed the consent and left it with her RN on her chart. Dulce Maria needs to sign this when he returns to the hospital next. HE doesn't know if he will be here tomorrow or Friday. I spoke with our schedulers and they will then obtain OR time with one of the OGI providers to do her Endometrial biopsy and Mirena IUD placement. Will continue the Provera for now.    I spoke with [facility] Simmons, and we will obtain pathology with the endometrial biopsy and if cancer then we will consult them.     The pt will need a Pre-op clearance from her PCP, Dr Howe before we can take her to the OR.    Nancy Hernandez DO  Spent 50 minutes talking with RN and the SO, dulce maria and other providers in her care today

## 2022-11-16 NOTE — PROGRESS NOTES
"SPIRITUAL HEALTH SERVICES Progress Note  [facility] General Surgery    Saw ptnt per follow-up. Visited by her , Leo, ptnt talked about how they first met \"at the [employer]\" and that they been together for \"44 years\".   I provided compassionate touch, reflective listening, and prayer.     SHS remain available.     DILMA BeckDiv  Chaplain Resident   [phone]   "

## 2022-11-17 NOTE — PROGRESS NOTES
VSS. A/O. Forgetful, slightly somnolent. Refused care/repo times. Very poor oral intake. LE pain, oxy given once. K+ & phos replaced, am recheck. LE numbness and severe edema & patty. Low urine, IVF started. Lower back wound dressing changed. Some redness on bi inner thigh.

## 2022-11-17 NOTE — PROGRESS NOTES
"CLINICAL NUTRITION SERVICES - REASSESSMENT NOTE      Future/Additional Recommendations:     Will do a 3 day calorie count, starting today  We already know that po intake is inadequate - not sure what a calorie count will offer  Pt has declined TF and supplements     Malnutrition: (9/21) - ongoing  % Weight Loss:  > 5% in 1 month (severe malnutrition) - significant loss over this admit (some attributed to fluid)   % Intake:  </= 50% for >/= 5 days (severe malnutrition) - consistently poor intake throughout this admit   Subcutaneous Fat Loss:  Orbital region moderate depletion - visual, per 8/16 note  Muscle Loss:  Temporal region moderate depletion - visual, per 8/16 note  Fluid Retention:  Mild generalized edema - lymphedema/cellulitis, \"Bilateral legs and feet with severe edema & weeping noted\"     Malnutrition Diagnosis: Severe malnutrition  In Context of:  Acute illness or injury on Chronic illness or disease       EVALUATION OF PROGRESS TOWARD GOALS   Diet:    Regular  Room Service with Assist    Intake/Tolerance:    Chart reviewed  11/16:  Per MD - \"she is clear she does not want a feeding tube and wants to eat\"   Calorie count ordered by MD (11/17-11/19)  Pt continues to have poor po intake - has been ongoing issue throughout admit  Pt has declined all offers of nutrition supplements  She is being seen daily for meal ordering assistance/encouragement    Stopped by to see pt this morning  Notes that she is note hungry and is only thirsty for some apple juice  She does not want any nutrition supplements    Daily multivitamin  IVF (D5) @ 50 mL/hr (204 cals)      ASSESSED NUTRITION NEEDS:  Dosing Weight: 80 kg (adjusted wt, based on current wt of 142 kg)  Estimated Energy Needs: 3790-7435 kcals (20-25 Kcal/Kg)  Justification: maintenance r/t BMI, Wounds  Estimated Protein Needs:  grams protein (1.2-1.5 g pro/Kg)  Justification: wound healing, obesity guidelines       NEW FINDINGS:     11/16: WOCN:  Wound " location: Right lower back in waist crease -  appears to be deeper tissue damage within a crease, possible shear component from lift.      Pt has dropped ~100# from admit (fluid loss and true wt loss) - suspect wt is now up with +3 LE edema    11/17/22 0623 142 kg (313 lb) Bed scale   11/16/22 0616 145.8 kg (321 lb 7.6 oz) Bed scale   11/14/22 0900 145.2 kg (320 lb) Bed scale   11/07/22 0545 134 kg (295 lb 6.7 oz) Bed scale   11/06/22 0601 134.2 kg (295 lb 13.7 oz) Bed scale       09/26/22 2329 128.8 kg (283 lb 14.4 oz) Bed scale   09/21/22 0623 128.4 kg (283 lb) Bed scale   09/20/22 0500 128.9 kg (284 lb 3.2 oz) Bed scale   09/19/22 0109 129 kg (284 lb 6.4 oz) Bed scale       07/16/22 0500 171.2 kg (377 lb 6.8 oz) Abnormal  Bed scale   07/15/22 0522 170.2 kg (375 lb 3.6 oz) Abnormal  --   07/14/22 0200 170 kg (374 lb 12.5 oz) Abnormal  Bed scale         Previous Goals (11/7):   Intake of at least 50% of meals BID  Evaluation: Not met    Previous Nutrition Diagnosis (11/7):   Inadequate oral intake related to poor appetite, disinterest in eating, and increased protein needs for wound healing as evidenced by 0-50% of meals consistently over 117 day admission  Evaluation: No change          CURRENT NUTRITION DIAGNOSIS  Inadequate oral intake related to poor appetite as evidenced by minimal po throughout admit and large amount of wt loss    INTERVENTIONS  Recommendations / Nutrition Prescription  Regular diet  Pt to be seen daily for meal ordering     Will do a 3 day calorie count, starting today  We already know that po intake is inadequate - not sure what a calorie count will offer  Pt has declined TF and supplements      Goals  Pt to meet 2/3 estimated needs via po intake      MONITORING AND EVALUATION:  Progress towards goals will be monitored and evaluated per protocol and Practice Guidelines

## 2022-11-17 NOTE — PROGRESS NOTES
A&Ox3. 2A lift T/R. VSS. . On a pulsate bariatric bed. Refusing cares and repositions at times. Phos 2.2 recheck in AM. BLE edema +4. Redness in all skin fold powder and interdry was applied. Scattered bruising. Calorie count patient has a poor appetite. Neida with 50cc.  Palliative meeting today with patient, spouse and SW awaiting spouse arrival.

## 2022-11-17 NOTE — PLAN OF CARE
"Goal Outcome Evaluation: Pt is alert, oriented to self, place, not time. AVSS on room air. Pain in BLE managed with Prn oxy, with relief reported. Bilateral legs and feet with severe edema & weeping noted. Needs encouragement and reminders to turn and reposition, refuses at times. Yells from room for \"help\" frequently. Education to use call light given, however pt continues to yell.  PRN zyprexa given with relief. Small amounts of vaginal bleeding noted. Small BM. [place] patent, still having poor urine output. Labs pulled, results pending.     Plan of Care Reviewed With: patient    Overall Patient Progress: no changeOverall Patient Progress: no change           "

## 2022-11-17 NOTE — PROGRESS NOTES
[facility]    Hospitalist Progress Note      Assessment & Plan   Cristy Bailey is a 77 year old female with a PMHx significant for morbid obesity; HTN; and HLD; who presented to [facility] 7/13/2022 with complete left-sided paralysis, left-sided facial droop, and difficulty speaking. CTA head remarkable for right MCA occlusion. She was given tenecteplase and subsequently transferred to [facility] 7/13/2022 for thrombectomy.   Hospital stay complicated by development and progression of flank wound.  This subsequently required surgical debridement and placement of wound vac (8/2) that was subsequently removed (8/22).   Prolonged hospitalization related to difficulties finding LTC placement.     Acute right MCA ischemic stroke with edema and right to left midline shift - status post tenecteplase and subsequent right  MCA mechanical thrombectomy 7/13/22  Presented to [facility] 7/13 with complete paralysis, left-sided facial droop, and aphasia. Code stroke initiated. Head CT 7/13 showed signs of an evolving R MCA infarct. CTA head 7/13 showed a right carotid terminus occlusion, right middle cerebral artery M1 segment occlusion with poor opacification of the more distal right MCA branches/poor collateral flow. CTA neck 7/13 negative for acute occlusions. Pt given tenecteplase 7/13 at 13:11. Noted to be agitated and was subsequently intubated given need for procedure.   Subsequently transferred to [facility] 7/13/2022 where she underwent above procedure.   Extubated 7/14. Head CT 7/14 showed evolution of acute infarct involving the R MCA distribution with increased swelling, cortical effacement, and new mild right-to-left midline shift; questionable hypoattenuation involving the bilateral occipital lobes which could be artifactual.  Echo 7/14 showed LVEF 50%, grade 1 diastolic dysfunction.   Started ASA and atorvastatin per stroke team.   Repeat head CT 7/15 showed evolving R MCA  stroke with areas of edema, overall stable.    Continue  mg daily (indefinitely--discussed with neurology 11/14), atorvastatin 40 mg daily.    Goal SBP <140/90.     Monitored on telemetry for first several wks of hospital stay without abnl rhythm so no need for 30d cardiac event monitor at discharge    Follow up with [facility] within 2-3 months of discharge from hospital     Suspected meningioma left parietal region, incidentally seen on admission CT on 7/13/2022  Head CT 7/13 showed a calcified extra-axial mass overlying the left parietal region measuring 1.4 cm, potentially representing a meningioma.    Follow up with repeat CT Jan 2022 if desired.     Septic shock, suspect due to bilateral lower extremity cellulitis, left greater than right, resolved  10/30: Patient initially developed what appeared like a rash involving the left thigh.  10/31: Pt with worsened erythema involving both legs with the left being more intense associated with worsening leukocytosis and elevated CRP. Started on pip-tazo and ID consulted. Developed refractory hypotension which required transfer to the ICU where PICC was placed and started on pressors. Started on stress IV steroids. BC's 10/31 NGTD. UA 10/31 abnormal, but UC growing only >100K mixed urogenital garland.  Cortisol level normal.  11/2: CT abdomen and pelvis 11/2 showed no focal findings of infection. CT right thigh 11/2 showed no osteomyelitis; no evidence of a discrete fluid collection to suggest an abscess; cellulitis in the abdominal wall and both proximal calves. Pip-tazo changed to ceftriaxone and clindamycin.   11/3: Weaned off pressors.  11/4: Clindamycin stopped. Started fludrocortisone.  11/5: Cellulitis overall improved and transferred out of the intensive care unit, completed abx 11/10    Monitor off fludrocortisone starting 11/16, added midodrine 2.5mg TID with meals      Acute kidney injury, suspect prerenal related to septic shock, resolved  Hypokalemia    Hypomagnesemia    Hypophosphatemia    Cr had been normal this hospitalization.  Cr increased to 2.08 on 10/31 associated with septic shock which was treated as noted.  CT abdomen/pelvis 11/2 negative for urinary obstructive process.  Cr normalized 11/2.     Continue to replace electrolytes as needed per protocol     Volume overload secondary to acute diastolic CHF exacerbation, resolved  History of essential hypertension  Not on/needing meds PTA.   As above, echo this stay showed EF 50% with grade I diastolic dysfunction; RV not well visualized but appeared mild-moderately dilated with global systolic function probably mildly reduced.   BPs were initially soft this stay and required IVFs. BPs improved.   Subsequently developed volume overload with elevated BP's and diuresed with IV furosemide, subsequently transitioned to PO furosemide.  Issues with recurrent hypokalemia on furosemide; noted often refusing potassium.  Started on lisinopril, but subsequently stopped due to soft BP's on furosemide.  Furosemide increased and spironolactone added 9/3.  Subsequently soft BP's and doses of BP meds held intermittently and subsequently developed septic shock 10/31 as noted.  Started on IV furosemide 11/4, now stopped    Difficult to add diuretics with her lower BPs and poor nutrition     Anxiety  Cognitive impairment with intermittent situational confusion  Metabolic encephalopathy, resolved  On no psychiatric medications PTA.  Pt has issues with anxiety and intermittent situational confusion this hospitalization.    Seen by Psychiatry this hospitalization.  Started on multiple medications this hospitalization including quetiapine, olanzapine, sertraline, mirtazapine, PRN hydroxyzine, PRN lorazepam.  Medication regimen was adjusted/titrated. Mirtazapine, PRN lorazepam and PRN hydroxyzine subsequently stopped. See previous progress notes for more details.  *PRN ativan stopped on 08/27/22 as leading to increased confusion.  Hydroxyzine stopped as well.     Continue aripiprazole 10 mg qpm; olanzapine 2.5 mg at bedtime; sertraline 50 mg daily    Continue PRN olanzapine 5 mg q6h     Lower back/right flank wound/abscess, suspect due to pressure type injury status post surgical debridement on 7/30/2022 and placement of wound vac on 8/2/2022 (subsequently removed 8/22/2022)  Deep tissue pressure injury (DTPI) left medial thigh/groin region, hospital acquired, progressed to HAPI stage 2 pressure injury; related to purwick external catheter tubing (not the purwick device itself)  Pressure injuries, bilateral buttocks  Intertriginous dermatitis  Osteoarthritis    History of bilateral hip replacements  History of chronic neck and back pain  Patient has chronic back pain, reports having it over the last 2 years. MRI L-spine in 2018 showed multilevel degenerative changes with mild central stenosis.  Pt had significant back pain early on in hospital stay. Was requiring IV hydromorphone.  Dose had to be decreased dt somnolence.  During this stay, patient was noted with 5-6cm by 2-3cm wound with swelling/fluid/blistering in a fold of her lower back, did not appear infected; this seemed to be the source of her pain. Padded dressing placed and WOC RN ordered. Pain initially improved.   Was started on a course of amoxicillin-clavulanate on 7/24.   On 7/26, was re-evaluated by WOC RN. Wound appeared more erythematous and purulence expressed. Worsening with shear stress from lift. Procal <0.05, WBC WNL. Abx changed to IV clindamycin.  Wound cultures obtained. On 7/28, wound grew proteus and Staph aureus (MRSA neg). US neg for abscess.   Lost IV access on 7/28 so abx were changed to oral clindamycin and oral levaquin. IV access re-established but was continued on oral abx.  General surgery consulted, ultimately underwent excisional debridement of R flank abscess in OR on 7/30. Intraop cultures showed polymicrobial growth with proteus mirabilis x2 strains  (both pan-sensitive), corynebacterium striatum and enterococcus faecalis.  Wound vac placed per general surgery on 8/2.  ID consulted on 8/2 and abx narrowed to amoxicillin-clavulanate alone, completed an additional 5 days of treatment on 8/7.   Wound vac removed 8/22.  CT right thigh 11/2 negative for osteomyelitis or deep infection (checked for septic shock) as above.    Continue local wound cares per WOC RN    Continue topical antifungal     Continue PRN oxycodone; minimize opioids as able    Continue regular repositioning (often refuses)    Management of other pressure injuries per WOC recommendations     Severe malnutrition related to acute and chronic medical issues  Morbid obesity with significant weight loss this hospitalization  Hypoglycemia  Nutritionist following. Appetite poor this stay, needing encouragement to take po.   There was some discussion previously about needing feeding support, but noted that patient does not want to have a feeding tube.  As of 10/19, has had 107 lb weight loss since admit (374 lbs on 7/14; 267 lbs 10/19); noted some contribution from diuresis for CHF.  Her appetite remains very poor.  She is refusing a feeding tube.    Continue to encourage oral intake    Continue supplements    Start calorie counts--this is being used to demonstrate to family patient's inadequate intake and help with hospice decision    Glucose checks BID and HS    IVF D5 NS with 20meq KCl @50ml/hr started 11/16     Vaginal bleeding  Abnormal endometrial thickening on US  Pt has had vaginal bleeding intermittently this hospitalization.  Pelvic US 10/8 showed abnormal endometrial thickening to 14 mm.   Gyn consulted 10/9 and recommend outpatient evaluation.  The initial plan was for an outpatient evaluation but on November 11 the patient developed further vaginal bleeding.      Continue to hold enoxaparin DVT prophylaxis     [facility] gyn following, they recommend biopsy + mirena IUD placement in OR    RCRI is a 2  which gives a risk of 10% for MI/death/cardiac arrest within 30 days.    Provera added on 11/17    I have recommended to family that instead of pursuing additional work-up for endometrial thickening, that we focus on making Cristy comfortable and pursue hospice given her overall decline     Dyslipidemia  FLP this stay showed tot cholest 163, HDL 47, LDL 91, .   Started on atorvastatin 40 mg daily    Continue atorvastatin 40 mg daily for secondary stroke prevention     Chronic bilateral lower extremity edema with chronic venous stasis dermatitis and chronic skin changes  History of left lower extremity cellulitis   Was hospitalized in 11/2021 for sepsis dt to pneumonia and LLE cellulitis.   During this stay, BLE noted to be patty and edematous, no unilateral leg swelling appreciated; LLE had patchy areas of erythema, no fluctuance, no painful areas with palpation; legs warm to touch. Lymphedema consulted.     Continue leg elevation, lymphedema wraps if agreeable     Constipation    Continue scheduled senna-docusate, polyethylene glycol, PRN bisacodyl     Suspected sleep apnea  O2 drop to mid 80s overnight 8/14. Briefly placed on 4LPM, which she then removed and then slept okay with sats in 90s thereafter.     Goals of care  Failure to thrive  During this hospitalization, discussed concern about overall prognosis, with her eating 0-25% of her meals and nutrition recommendations for alternative forms of nutrition; at that time, she had lost 70 lbs since admit (some of that was probably related to diuresis and some related to variable weights in hospital, but still significant).   Patient and significant other/HCA Leo, reiterated her wishes for DNR/DNI and no feeding tubes.  Palliative care met with Leo 9/28 and he did not want hospice.  I met with Leo and the patient's daughter on November 7.  They wish to continue with restorative care and no CODE BLUE status.    Pt is DNR/DNI    No tube  "feedings    Ongoing discussion with family on 11/16 and 11/17, recommend pursue hospice given her overall decline and need for continuous IVF and electrolyte repletion to keep her body alive     OTHER RESOLVED/CHRONIC ISSUES:  Urinary retention, resolved  Retaining urine on 8/14. UA WNL, not worrisome for infection.  Requiring straight cath x1 on 8/20 PM.  Subsequently good UOP, no longer requiring straight cath. No evidence of obstruction on PVR.      Dysphagia due to CVA, resolved  Seen by SLP and noted with dysphagia. Initially required some intermittent fluid boluses.   Was eventually able to advance to regular diet w/thin liquids.     Prolonged QTc.  EKG on 7/13 showed QTc 494.   Repeat EKG on 7/30 (while on levofloxacin) showed QTc stable at 475. Has since completed course of abx as below.  Had been monitoring on telemetry this stay. No concerning findings so tele was ultimately dc'd 8/19.     Anemia, suspect dilutional component, resolved  Hgb normal on admit. Hgb 11.5 on 7/16. No overt clinical signs of major bleeding.  Hgb subsequently stable and later normalized.      Clinically Significant Risk Factors              # Hypoalbuminemia: Lowest albumin = 1.3 g/dL (Ref range: 3.5-5.2) at 11/1/2022  4:19 AM, will monitor as appropriate          # Severe Obesity: Estimated body mass index is 50.52 kg/m  as calculated from the following:    Height as of this encounter: 1.676 m (5' 6\").    Weight as of this encounter: 142 kg (313 lb).   # Severe Malnutrition: based on nutrition assessment        DVT Prophylaxis: Pneumatic Compression Devices  Code Status: No CPR- Do NOT Intubate   unknown     Melissa Howe, DO  Hospitalist Service  [facility]  Securely message with the Vocera Web Console (learn more here)  Text Page (7am - 6pm) via [employer] Paging/Directory      Interval History    Patient seen and examined. She is doing fine. Requests a sip of water which I provided to her. I also opened " her ice cream for her to eat. Again discussed that she does not eat enough to keep her body alive. Later spoke with Leo as well regarding my concerns.  Spent 45 minutes with >50% time spent evaluating patient, reviewing chart, discussing care plan with SW/RN and with patient's significant other Leo via phone. Discussed with OBGYN, Dr Paulino as well.    -Data reviewed today: I reviewed all new labs and imaging results over the last 24 hours. I personally reviewed no images or EKG's today.    Physical Exam   Temp: 98.4  F (36.9  C) Temp src: Oral BP: 92/48 Pulse: 88   Resp: 18 SpO2: 96 % O2 Device: None (Room air)    Vitals:    11/14/22 0900 11/16/22 0616 11/17/22 0623   Weight: 145.2 kg (320 lb) 145.8 kg (321 lb 7.6 oz) 142 kg (313 lb)     Vital Signs with Ranges  Temp:  [97.8  F (36.6  C)-98.4  F (36.9  C)] 98.4  F (36.9  C)  Pulse:  [81-88] 88  Resp:  [16-18] 18  BP: ()/(48-88) 92/48  SpO2:  [96 %-97 %] 96 %  I/O last 3 completed shifts:  In: 1138 [P.O.:220; I.V.:918]  Out: 350 [Urine:350]    Constitutional: Drowsy, cooperative, no apparent distress  Respiratory: Clear to auscultation bilaterally, no crackles or wheezing  Cardiovascular: Regular rate and rhythm, normal S1 and S2, and no murmur noted  GI: Normal bowel sounds, soft, non-distended, non-tender, obese  Skin/Integumen: No rashes, no cyanosis, ++3 lower extremity edema  Other: Able to feed herself an ice cream    Medications     dextrose 5% and 0.9% NaCl with potassium chloride 20 mEq 50 mL/hr at 11/17/22 0607       ARIPiprazole  10 mg Oral QPM     aspirin  325 mg Oral Daily     atorvastatin  40 mg Oral QPM     [Held by provider] enoxaparin ANTICOAGULANT  40 mg Subcutaneous Q12H     medroxyPROGESTERone  10 mg Oral Daily     miconazole   Topical BID     midodrine  2.5 mg Oral TID w/meals     multivitamin w/minerals  1 tablet Oral Daily     OLANZapine  2.5 mg Oral At Bedtime     polyethylene glycol  17 g Oral Daily     [Held by provider] potassium  chloride  20 mEq Oral BID     senna-docusate  1-2 tablet Oral BID     sertraline  50 mg Oral Daily     sodium chloride (PF)  10-40 mL Intracatheter Q7 Days     sodium chloride (PF)  3 mL Intracatheter Q8H     [Held by provider] spironolactone  12.5 mg Oral Daily       Data   Recent Labs   Lab 11/17/22  1138 11/17/22  0554 11/17/22  0202 11/16/22  0753 11/16/22  0556 11/15/22  1734 11/15/22  0557 11/14/22 1959 11/14/22 0608 11/13/22 2001 11/13/22 0622   WBC  --  9.5  --   --   --   --  5.7  --   --   --   --    HGB  --  11.3*  --   --   --   --  10.7*  --   --   --  10.8*   MCV  --  86  --   --   --   --  85  --   --   --   --    PLT  --  245  --   --   --   --  253  --   --   --   --    NA  --  140  --   --   --   --   --   --   --   --   --    POTASSIUM  --  3.7  --   --  3.4  --   --   --  3.5   < > 3.0*   CHLORIDE  --  109  --   --   --   --   --   --   --   --   --    CO2  --  27  --   --   --   --   --   --   --   --   --    BUN  --  6*  --   --   --   --   --   --   --   --   --    CR  --  0.43*  --   --   --   --   --   --   --   --   --    ANIONGAP  --  4  --   --   --   --   --   --   --   --   --    ASHIA  --  8.0*  --   --   --   --   --   --   --   --   --    * 89 86   < >  --    < >  --    < >  --   --   --     < > = values in this interval not displayed.       No results found for this or any previous visit (from the past 24 hour(s)).

## 2022-11-17 NOTE — PROGRESS NOTES
Care Management Follow Up    Length of Stay (days): 127    Expected Discharge Date:       Concerns to be Addressed:       Patient plan of care discussed at interdisciplinary rounds: Yes    Anticipated Discharge Disposition: Long Term Care     Anticipated Discharge Services:    Anticipated Discharge DME:      Patient/family educated on Medicare website which has current facility and service quality ratings:    Education Provided on the Discharge Plan:    Patient/Family in Agreement with the Plan: yes    Referrals Placed by CM/TONY:    Private pay costs discussed: Not applicable    Additional Information:  TONY was informed by TONY Woods, that provider would discuss the option for palliative care today with s/oLeo around noon. SW to consult with provider after regarding plan of care.       JOSE GUADALUPE Medina

## 2022-11-18 NOTE — PROGRESS NOTES
Notified provider about indwelling snyder catheter discussed removal or continued need.    Did provider choose to remove indwelling snyder catheter? No    Provider's snyder indication for keeping indwelling snyder catheter: Retention/Non Mobile     Is there an order for indwelling snyder catheter? Yes    *If there is a plan to keep snyder catheter in place at discharge daily notification with provider is not necessary.

## 2022-11-18 NOTE — PROGRESS NOTES
Patient was scheduled to receive a phosphorus iv infusion, per pharmacy it is not compatible with current ivf running. Changed from IV replacement or oral replacement with ok from pharmacy.

## 2022-11-18 NOTE — PROGRESS NOTES
[facility]    Hospitalist Progress Note      Assessment & Plan   Cristy Bailey is a 77 year old female with a PMHx significant for morbid obesity; HTN; and HLD; who presented to [facility] 7/13/2022 with complete left-sided paralysis, left-sided facial droop, and difficulty speaking. CTA head remarkable for right MCA occlusion. She was given tenecteplase and subsequently transferred to [facility] 7/13/2022 for thrombectomy.   Hospital stay complicated by development and progression of flank wound.  This subsequently required surgical debridement and placement of wound vac (8/2) that was subsequently removed (8/22).   Prolonged hospitalization related to difficulties finding LTC placement. Complicated by poor intake, malnutrition with weight loss of 100 lbs and feeding tube not within goals of care. Discussed at length with family and opted to pursue comfort care starting 11/18/22.    Comfort care  Failure to thrive  During this hospitalization, discussed concern about overall prognosis, with her eating 0-25% of her meals and nutrition recommendations for alternative forms of nutrition; at that time, she had lost 70 lbs since admit (some of that was probably related to diuresis and some related to variable weights in hospital, but still significant).   Patient and significant other/HCA Leo, reiterated her wishes for DNR/DNI and no feeding tubes.  - Palliative care met with Leo 9/28 and he did not want hospice.  - 11/7 provider met with Leo and Kaylynn, at that time she had just left ICU and they desired ongoing restorative cares.  - Had multiple conversations with family Leo and Kaylynn 11/16, 11/17 and 11/18. Further cares hindered by patient's severe malnutrition, she is unable to maintain her glucose, electrolytes and has poor urine output without supplemental IVF. IVF then makes her swelling worse and this becomes cyclical. Given Cristy's clear indication that she DOES NOT  WANT a feeding tube, hospice is recommended.  - 11/18, Leo elects to make patient comfort care and attempt to find placement outside of hospital closer to [place]. Daughter Kaylynn in agreement and will plan to come to hospital on 11/19     Acute right MCA ischemic stroke with edema and right to left midline shift - status post tenecteplase and subsequent right  MCA mechanical thrombectomy 7/13/22  Presented to [facility] 7/13 with complete paralysis, left-sided facial droop, and aphasia. Head CT 7/13 showed signs of an evolving R MCA infarct. CTA head 7/13 showed a right carotid terminus occlusion, right middle cerebral artery M1 segment occlusion with poor opacification of the more distal right MCA branches/poor collateral flow. CTA neck 7/13 negative for acute occlusions. Pt given tenecteplase 7/13 at 13:11.   Subsequently transferred to [facility] 7/13/2022 where she underwent mechanical thrombectomy. Head CT 7/14 showed evolution of acute infarct involving the R MCA distribution with increased swelling, cortical effacement, and new mild right-to-left midline shift; questionable hypoattenuation involving the bilateral occipital lobes which could be artifactual.    Repeat head CT 7/15 showed evolving R MCA stroke with areas of edema, overall stable.  - stop further ASA/statin given life expectancy     Suspected meningioma left parietal region, incidentally seen on admission CT on 7/13/2022  Head CT 7/13 showed a calcified extra-axial mass overlying the left parietal region measuring 1.4 cm, potentially representing a meningioma.      Acute kidney injury, suspect prerenal related to septic shock, resolved  Hypokalemia   Hypomagnesemia    Hypophosphatemia    Cr had been normal this hospitalization.  Cr increased to 2.08 on 10/31 associated with septic shock which was treated as noted. Normalized on 11/2  - no further electrolyte monitoring     Volume overload secondary to acute diastolic CHF exacerbation,  resolved  History of essential hypertension  Not on/needing meds PTA. Echo showed EF 50% with grade I diastolic dysfunction; RV not well visualized but appeared mild-moderately dilated with global systolic function probably mildly reduced.   - intermittent diuretics/IVF needs this hospital stay  - no further monitoring, diuretics discontinued     Anxiety  Cognitive impairment with intermittent situational confusion  Metabolic encephalopathy, resolved  On no psychiatric medications PTA.  Pt has issues with anxiety and intermittent situational confusion this hospitalization.    Seen by Psychiatry, trialed on quetiapine, olanzapine, sertraline, mirtazapine, PRN hydroxyzine, PRN lorazepam.  *PRN ativan stopped on 08/27/22 as leading to increased confusion. Hydroxyzine stopped as well.   - Continue aripiprazole 10 mg qpm; olanzapine 2.5 mg at bedtime; sertraline 50 mg daily  - Continue PRN olanzapine 5 mg q6h     Lower back/right flank wound/abscess, suspect due to pressure type injury status post surgical debridement on 7/30/2022 and placement of wound vac on 8/2/2022 (subsequently removed 8/22/2022)  Deep tissue pressure injury (DTPI) left medial thigh/groin region, hospital acquired, progressed to HAPI stage 2 pressure injury; related to purwick external catheter tubing (not the purwick device itself)  Pressure injuries, bilateral buttocks  Intertriginous dermatitis  Osteoarthritis    History of bilateral hip replacements  History of chronic neck and back pain  Patient has chronic back pain, reports having it over the last 2 years. MRI L-spine in 2018 showed multilevel degenerative changes with mild central stenosis.  Had a 5-6cm by 2-3cm wound with swelling/fluid/blistering in a fold of her lower back, did not appear infected, then slowly became infected and required excisional debridement in late July with abx treatment and wound vac (removed 8/22)  - Continue local wound cares per WOC RN  - Continue topical  antifungal   - Continue PRN oxycodone  - Continue regular repositioning (often refuses)  - Management of other pressure injuries per WOC recommendations     Severe malnutrition related to acute and chronic medical issues  Morbid obesity with significant weight loss this hospitalization  Hypoglycemia  Nutritionist following. Appetite poor this stay, even with encouragement  As of 10/19, had 107 lb weight loss since admit (374 lbs on 7/14; 267 lbs 10/19); noted some contribution from diuresis for CHF.  Her appetite remains very poor.  She is refusing a feeding tube.  - diet as able, no further monitoring with comfort care     Vaginal bleeding  Abnormal endometrial thickening on US  Pt has had vaginal bleeding intermittently this hospitalization.  Pelvic US 10/8 showed abnormal endometrial thickening to 14 mm.   - OGI gyn followed, plan conservative management with 10mg provera daily, which we will continue for comfort  - if worsening bleeding, can uptitrate dosing     Dyslipidemia  FLP this stay showed tot cholest 163, HDL 47, LDL 91, .   - atorvastatin discontinued given plan for comfort care     Chronic bilateral lower extremity edema with chronic venous stasis dermatitis and chronic skin changes  History of left lower extremity cellulitis   Was hospitalized in 11/2021 for sepsis dt to pneumonia and LLE cellulitis.   During this stay, BLE noted to be patty and edematous, no unilateral leg swelling appreciated; LLE had patchy areas of erythema, no fluctuance, no painful areas with palpation; legs warm to touch. Lymphedema consulted.     Continue leg elevation, lymphedema wraps if agreeable     Constipation    Continue scheduled senna-docusate, polyethylene glycol, PRN bisacodyl     Suspected sleep apnea  - O2 support as needed     OTHER RESOLVED/CHRONIC ISSUES:  Urinary retention, resolved  Dysphagia due to CVA, resolved   Prolonged QTc.  Anemia, suspect dilutional component, resolved  Septic shock, suspect due  "to bilateral lower extremity cellulitis, left greater than right, resolved    Clinically Significant Risk Factors              # Hypoalbuminemia: Lowest albumin = 1.3 g/dL (Ref range: 3.5-5.2) at 11/1/2022  4:19 AM, will monitor as appropriate          # Severe Obesity: Estimated body mass index is 54.72 kg/m  as calculated from the following:    Height as of this encounter: 1.676 m (5' 6\").    Weight as of this encounter: 153.8 kg (339 lb).   # Severe Malnutrition: based on nutrition assessment        DVT Prophylaxis: Pneumatic Compression Devices  Code Status: No CPR- Do NOT Intubate     Expected Discharge Date: 11/28/2022    Discharge Delays: Placement - LTC  *Medically Ready for Discharge    Discharge Comments: LTC placement with hospice unknown     Melissa Howe,   Hospitalist Service  [facility]  Securely message with the Vocera Web Console (learn more here)  Text Page (7am - 6pm) via [employer] Paging/Directory      Interval History    Patient seen and examined. She is doing okay. She was able to eat a couple of bites of mac and cheese and broccoli with Leo. She otherwise has no complaints for me today. Discussed plan of care to make her comfort care/hospice and she says \"ok\". Did explain that her body has started the dying process given her prolonged malnutrition and that we will allow the body to continue on this course. She appears to understand.  Spent 55 minutes with >50% time spent evaluating patient, reviewing chart, discussing care plan/GOC with SW/RN/GYN and with patient's significant other Leo in person and with patient's daughter via phone.    -Data reviewed today: I reviewed all new labs and imaging results over the last 24 hours. I personally reviewed no images or EKG's today.    Physical Exam   Temp: 97.9  F (36.6  C) Temp src: Oral BP: 97/59 Pulse: 78   Resp: 16 SpO2: 97 % O2 Device: None (Room air)    Vitals:    11/16/22 0616 11/17/22 0623 11/18/22 0634   Weight: " 145.8 kg (321 lb 7.6 oz) 142 kg (313 lb) (!) 153.8 kg (339 lb)     Vital Signs with Ranges  Temp:  [97.4  F (36.3  C)-98.9  F (37.2  C)] 97.9  F (36.6  C)  Pulse:  [78-91] 78  Resp:  [16-19] 16  BP: ()/(49-59) 97/59  SpO2:  [96 %-97 %] 97 %  I/O last 3 completed shifts:  In: 1233 [P.O.:50; I.V.:1183]  Out: 300 [Urine:300]    Constitutional: Awakens but falls asleep easily, cooperative, no apparent distress  Respiratory: Clear to auscultation bilaterally, no crackles or wheezing  Cardiovascular: Regular rate and rhythm, normal S1 and S2, and no murmur noted  GI: Normal bowel sounds, soft, non-distended, non-tender, obese  Skin/Integumen: No rashes, no cyanosis, ++4 lower extremity edema  Other:     Medications       ARIPiprazole  10 mg Oral QPM     [START ON 11/19/2022] medroxyPROGESTERone  10 mg Oral Daily     miconazole   Topical BID     OLANZapine  2.5 mg Oral At Bedtime     polyethylene glycol  17 g Oral Daily     senna-docusate  1-2 tablet Oral BID     sertraline  50 mg Oral Daily     sodium chloride (PF)  10-40 mL Intracatheter Q7 Days     sodium chloride (PF)  3 mL Intracatheter Q8H       Data   Recent Labs   Lab 11/18/22  0825 11/18/22  0550 11/17/22  2219 11/17/22  2136 11/17/22  1138 11/17/22  0554 11/16/22  0753 11/16/22  0556 11/15/22  1734 11/15/22  0557 11/13/22 2001 11/13/22  0622   WBC  --   --   --   --   --  9.5  --   --   --  5.7  --   --    HGB  --   --   --   --   --  11.3*  --   --   --  10.7*  --  10.8*   MCV  --   --   --   --   --  86  --   --   --  85  --   --    PLT  --   --   --   --   --  245  --   --   --  253  --   --    NA  --   --   --   --   --  140  --   --   --   --   --   --    POTASSIUM  --  3.5  --   --   --  3.7  --  3.4  --   --    < > 3.0*   CHLORIDE  --   --   --   --   --  109  --   --   --   --   --   --    CO2  --   --   --   --   --  27  --   --   --   --   --   --    BUN  --   --   --   --   --  6*  --   --   --   --   --   --    CR  --   --   --   --   --  0.43*   --   --   --   --   --   --    ANIONGAP  --   --   --   --   --  4  --   --   --   --   --   --    ASHIA  --   --   --   --   --  8.0*  --   --   --   --   --   --    GLC 96  --  98 98   < > 89   < >  --    < >  --    < >  --     < > = values in this interval not displayed.       No results found for this or any previous visit (from the past 24 hour(s)).

## 2022-11-18 NOTE — PROGRESS NOTES
Gynecology Progress Note    S: Patient sleeping.   Per RN, had a small amount of bleeding during cares earlier today and overnight.     O:   Patient Vitals for the past 24 hrs:   BP Temp Temp src Pulse Resp SpO2 Weight   11/18/22 0844 104/49 97.4  F (36.3  C) Oral 91 16 96 % --   11/18/22 0634 -- -- -- -- -- -- (!) 153.8 kg (339 lb)   11/17/22 2133 100/53 98.9  F (37.2  C) Oral 88 19 96 % --   11/17/22 1607 97/53 98.5  F (36.9  C) Oral 88 20 97 % --   11/17/22 1225 92/48 -- -- 88 -- -- --     Gen: asleep, does not awaken to voice     A/P:    Cristy is a 77 year old female who has been hospitalized about 4 months with stroke, cellulitis, septic shock, and a flank wound. She has ongoing severe malnutrition and cognitive impairment/confusion. Her hospitalization has been prolonged due to difficulties finding LTAC placement, and her primary team is now discussing comfort cares/hospice consideration.      During her hospitalization she developed vaginal bleeding. She initially had this on 10/8 and gyn was consulted. A pelvic ultrasound was performed on 10/8 which showed a thickened endometrial lining at 14 mm.  At that time, gyn (Dr. Kennedy with [facility]) recommended outpatient management and then bleeding resolved. A CT on 11/2 showed a normal uterus without mass. On 11/11, she developed further bleeding and OGI was consulted.      OGI physicians had not been able to speak to Leo in person yet this week. On Wednesday, Dr. Hernandez reviewed r/b of post-menopausal bleeding evaluation/management consisting of D&C/Mirena in OR earlier this week by phone, and this actually is scheduled for next Tuesday with Dr. Roca. Leo has not signed this consent. However, given the patient's overall clinical scenario, I have significant concerns about her anesthesia risk for undergoing this diagnostic procedure, which is unlikely to provide benefit or alter her future treatment.     Until we are able to discuss this in person with Leo  (as her medical decision-maker), I do not recommend proceeding with further diagnostic evaluation. If bleeding recurs, can restart progesterone but would hold off on this if she's not bleeding to minimize medications.     I left my pager number with her nurse, Capri, if Leo comes to the hospital today.   I have asked my  to cancel the procedure for next Tuesday pending further discussions.     Mirian Nettles MD   [phone]    Addendum: I spoke with Leo at the bedside today. We discussed Cristy's overall clinical situation and the r/b of the options for her bleeding. We reviewed surgical/anesthesia risks of D&C and Mirena placement, and also discussed that if endometrial cancer were diagnosed, with her current severe malnutrition, she likely would not be a candidate for curative treatment. Alternatively, I discussed the option of focusing on her comfort and symptoms, and using Provera to decrease/stop her bleeding, which is minimal right now. Provera dose could be increased to much higher doses than she is currently getting if needed. Also, if medical therapy is no longer managing her bleeding, then D&C/Mirena can always remain an option in the future.   After discussion, he elected to proceed with conservative treatment of bleeding, and at this time not pursue further diagnostic evaluation. We will cancel the procedure for next Tuesday.   Since she did have some bleeding with cares today, we will restart Provera 10mg daily, and monitor bleeding. If persistent bleeding on this dose, can increase both the dose/frequency of administration incrementally to manage symptoms.     Gyn will follow peripherally. Please contact OGI physician on call with any questions.     Mirian Nettles MD     Time spent: 45 minutes, time spent in face-to-face discussion with Leo, hospitalist Dr. Howe, and bedside RN

## 2022-11-18 NOTE — PROGRESS NOTES
Care Management Follow Up    Length of Stay (days): 128    Expected Discharge Date:       Concerns to be Addressed:       Patient plan of care discussed at interdisciplinary rounds: Yes    Anticipated Discharge Disposition: Long Term Care     Anticipated Discharge Services:    Anticipated Discharge DME:      Patient/family educated on Medicare website which has current facility and service quality ratings:    Education Provided on the Discharge Plan:    Patient/Family in Agreement with the Plan: yes    Referrals Placed by CM/SW:    Private pay costs discussed: Not applicable    Additional Information:  SW resent referral to both [place] locations as requested by facility.       JOSE GUADALUPE Medina

## 2022-11-18 NOTE — PLAN OF CARE
Goal Outcome Evaluation:  Orientation/Cognitive: A&OX4 this shift  Observation Goals (Met/ Not Met): NA, inpt  Mobility Level/Assist Equipment: Total lift, will need 3 or more people to help with turn and cleaning  Fall Risk (Y/N): Yes  Behavior Concerns: Calm and cooperative  Pain Management: Denies pain, BLE very edematous, tender to touch  Tele/VS/O2: VSS on RA, BP still soft  ABNL Lab/BG: Phosphorus low at 2.2, replaced, recheck tomorrow AM  Diet: Regular diet, very poor appetite, frequent encouragements provided, pt very reluctant  Bowel/Bladder: Incontinent of BM this shift, snyder with very concentrated urine, having some vaginal spotting as well.  Skin Concerns: redness and rash in all the skin folds, dressing on the R back c.d.I. Turned and repoed Q 2hrs  Drains/Devices: PICC line with IVF at 50mL/hr  Tests/Procedures for next shift: Phosphorus recheck tomorrow AM  Anticipated DC date & active delays: Pending palliative conference and goals of care, SO plans to visit pt tomorrow between 10-12 AM to discuss endometrial biopsy vs goals of care. MDs recommending hospice. OB/GYN following.  Patient Stated Goal for Today: To rest

## 2022-11-18 NOTE — PLAN OF CARE
Goal Outcome Evaluation: Pt is alert, oriented to self, place, not time. AVSS on room air. Pain in BLE managed with Prn oxy x1,  relief reported. Bilateral legs and feet with severe edema & weeping noted. Needs encouragement and reminders to turn and reposition, refuses at times. Small amounts of vaginal bleeding, MD aware.  Carrasquillo patent, still having poor urine output. IVF. Pt ate one piece of de la paz and drank 100 ounces of apple juice from dinner.  Labs pulled, results pending.       Plan of Care Reviewed With: patient      Plan of Care Reviewed With: patient    Overall Patient Progress: no changeOverall Patient Progress: no change

## 2022-11-18 NOTE — PROGRESS NOTES
CALORIE COUNT      Approximate Oral Intake for: (11/17)      Calories: 130 cals  Protein: 3 gm pro        Estimated Needs:    Dosing Weight: 80 kg (adjusted wt, based on current wt of 142 kg)  Estimated Energy Needs: 2791-4251 kcals (20-25 Kcal/Kg)  Estimated Protein Needs:  grams protein (1.2-1.5 g pro/Kg)      Summary:   Diet: Regular + Room Service with Assist    Continue calorie count to quantify po intake      ADDENDUM:  Pt has transitioned to comfort cares.  Calorie count d/c'd.  Will provide po intake as pt desires    Tejal Medina RD, LD  Clinical Dietitian - [facility]   Pager - [phone]

## 2022-11-18 NOTE — PROGRESS NOTES
Gynecology Progress Note:    Cristy is a 77 year old female who has been hospitalized about 4 months with stroke, cellulitis, septic shock, and a flank wound.      During her hospitalization she developed vaginal bleeding.  A pelvic ultrasound was performed which showed a thickened endometrial lining at 14 mm.  A CT last week showed a normal uterus without mass.    There has been talk about options for her vaginal bleeding and the possibility of doing an endometrial biopsy and Mirena IUD placement for further evaluation and treatment and in fact one of my partners signed a consent and spoke with her medical decision maker, MACIE Bailey, regarding the potential risks.  I attempted to connect with Leo today but he never came to the hospital.  I have a lot of hesitation regarding proceeding with this procedure with the patient in her current state.  I spoke with Dr. Ochoa (hospitalist in charge of her care at this time) and she does not think this procedure is in Cristy's best interest.  She would like to further discuss this with Leo (as would our team), however it has been difficult to connect with him.  It sounds like Dr. Ochoa's recommendation would be hospice for this patient.      Jennifer Massey MD  Obstetrics, Gynecology and Infertility    Spent 15 minutes talking with  Her RN and her primary, Dr. Ochoa regarding her care today.

## 2022-11-18 NOTE — PROGRESS NOTES
Pt has been transitioned to comfort cares. Medications, assessments and vs have been discontinued. Carrasquillo kept for urine retention, fluids stopped.

## 2022-11-19 NOTE — PROGRESS NOTES
[facility]    Hospitalist Progress Note      Assessment & Plan   Cristy Bailey is a 77 year old female with a PMHx significant for morbid obesity; HTN; and HLD; who presented to [facility] 7/13/2022 with complete left-sided paralysis, left-sided facial droop, and difficulty speaking. CTA head remarkable for right MCA occlusion. She was given tenecteplase and subsequently transferred to [facility] 7/13/2022 for thrombectomy.   Hospital stay complicated by development and progression of flank wound.  This subsequently required surgical debridement and placement of wound vac (8/2) that was subsequently removed (8/22).   Prolonged hospitalization related to difficulties finding LTC placement. Complicated by poor intake, malnutrition with weight loss of 100 lbs and feeding tube not within goals of care. Discussed at length with family and opted to pursue comfort care starting 11/18/22.    Comfort care  Failure to thrive  During this hospitalization, discussed concern about overall prognosis, with her eating 0-25% of her meals and nutrition recommendations for alternative forms of nutrition; at that time, she had lost 70 lbs since admit (some of that was probably related to diuresis and some related to variable weights in hospital, but still significant).   Patient and significant other/HCA Leo, reiterated her wishes for DNR/DNI and no feeding tubes.  - 9/28 and 11/7 Leo and Kaylynn were not ready for hospice.  - Had multiple conversations with family Leo and Kaylynn 11/16, 11/17 and 11/18. Further cares hindered by patient's severe malnutrition, she is unable to maintain her glucose, electrolytes and has poor urine output without supplemental IVF. IVF then makes her swelling worse and this becomes cyclical. Given Cristy's clear indication that she DOES NOT WANT a feeding tube, hospice is recommended.  - 11/18, Leo elected to make patient comfort care and attempt to find placement  outside of hospital closer to [place]. Daughter Kaylynn in agreement.  - continue comfort cares     Acute right MCA ischemic stroke with edema and right to left midline shift - status post tenecteplase and subsequent right  MCA mechanical thrombectomy 7/13/22  Presented to [facility] 7/13 with complete paralysis, left-sided facial droop, and aphasia. Head CT 7/13 showed signs of an evolving R MCA infarct. CTA head 7/13 showed a right carotid terminus occlusion, right middle cerebral artery M1 segment occlusion with poor opacification of the more distal right MCA branches/poor collateral flow. CTA neck 7/13 negative for acute occlusions. Pt given tenecteplase 7/13 at 13:11.   Subsequently transferred to [facility] 7/13/2022 where she underwent mechanical thrombectomy. Head CT 7/14 showed evolution of acute infarct involving the R MCA distribution with increased swelling, cortical effacement, and new mild right-to-left midline shift; questionable hypoattenuation involving the bilateral occipital lobes which could be artifactual.    Repeat head CT 7/15 showed evolving R MCA stroke with areas of edema, overall stable.  - stop further ASA/statin given life expectancy    Anxiety  Cognitive impairment with intermittent situational confusion  Metabolic encephalopathy, resolved  *PRN ativan stopped on 08/27/22 as leading to increased confusion. Hydroxyzine stopped as well.   - Continue aripiprazole 10 mg qpm; olanzapine 2.5 mg at bedtime; sertraline 50 mg daily  - Continue PRN olanzapine 5 mg q6h     Lower back/right flank wound/abscess, suspect due to pressure type injury status post surgical debridement on 7/30/2022 and placement of wound vac on 8/2/2022 (subsequently removed 8/22/2022)  Deep tissue pressure injury (DTPI) left medial thigh/groin region, hospital acquired, progressed to HAPI stage 2 pressure injury; related to purwick external catheter tubing (not the purwick device itself)  Pressure injuries, bilateral  buttocks  Intertriginous dermatitis  Osteoarthritis    History of bilateral hip replacements  History of chronic neck and back pain  Patient has chronic back pain, reports having it over the last 2 years. MRI L-spine in 2018 showed multilevel degenerative changes with mild central stenosis.  Had a 5-6cm by 2-3cm wound with swelling/fluid/blistering in a fold of her lower back, did not appear infected, then slowly became infected and required excisional debridement in late July with abx treatment and wound vac (removed 8/22)  - Continue local wound cares per WOC RN  - Continue topical antifungal   - Continue PRN oxycodone  - Continue regular repositioning (often refuses)  - Management of other pressure injuries per WOC recommendations     Severe malnutrition related to acute and chronic medical issues  Morbid obesity with significant weight loss this hospitalization  Hypoglycemia  Nutritionist following. Appetite poor this stay, even with encouragement  As of 10/19, had 107 lb weight loss since admit (374 lbs on 7/14; 267 lbs 10/19); noted some contribution from diuresis for CHF.  Her appetite remains very poor.  She is refusing a feeding tube.  - diet as able, no further monitoring with comfort care     Vaginal bleeding  Abnormal endometrial thickening on US  Pt has had vaginal bleeding intermittently this hospitalization.  Pelvic US 10/8 showed abnormal endometrial thickening to 14 mm.   - OGI gyn followed, plan conservative management with 10mg provera daily, which we will continue for comfort  - if worsening bleeding, can uptitrate dosing     Chronic bilateral lower extremity edema with chronic venous stasis dermatitis and chronic skin changes    Continue leg elevation, lymphedema wraps if agreeable     Constipation    Continue scheduled senna-docusate, polyethylene glycol, PRN bisacodyl     OTHER RESOLVED/CHRONIC ISSUES: no further monitoring or medications  Urinary retention, resolved  Dysphagia due to CVA,  "resolved   Prolonged QTc.  Anemia, suspect dilutional component, resolved  Septic shock, suspect due to bilateral lower extremity cellulitis, left greater than right, resolved  Acute kidney injury, suspect prerenal related to septic shock, resolved  Hypokalemia   Hypomagnesemia    Hypophosphatemia    Volume overload secondary to acute diastolic CHF exacerbation, resolved  History of essential hypertension  Suspected meningioma left parietal region, incidentally seen on admission CT on 7/13/2022  Dyslipidemia  Suspected sleep apnea    Clinically Significant Risk Factors              # Hypoalbuminemia: Lowest albumin = 1.3 g/dL (Ref range: 3.5-5.2) at 11/1/2022  4:19 AM, will monitor as appropriate          # Severe Obesity: Estimated body mass index is 54.72 kg/m  as calculated from the following:    Height as of this encounter: 1.676 m (5' 6\").    Weight as of this encounter: 153.8 kg (339 lb).   # Severe Malnutrition: based on nutrition assessment        DVT Prophylaxis: Pneumatic Compression Devices  Code Status: No CPR- Do NOT Intubate    Expected Discharge Date: 11/28/2022    Discharge Delays: Placement - LTC  *Medically Ready for Discharge    Discharge Comments: LTC placement with hospice unknown     Melissa Howe, DO  Hospitalist Service  [facility]  Securely message with the Vocera Web Console (learn more here)  Text Page (7am - 6pm) via [employer] Paging/Directory      Interval History    Patient seen and examined. She is a little more interactive. Looking forward to Kaylynn's visit. Later I joined her and Kaylynn. She does not want to eat her chili today. Only complaint is she is cold and her feet/legs hurt. Discussed plan of care for hospice with daughter and provided reassurance of the current care plan. Discussed with RN as well.    -Data reviewed today: I reviewed all new labs and imaging results over the last 24 hours. I personally reviewed no images or EKG's today.    Physical Exam "   Temp: 97.8  F (36.6  C) Temp src: Oral BP: 114/44 Pulse: 89   Resp: 16 SpO2: 98 % O2 Device: None (Room air)    Vitals:    11/16/22 0616 11/17/22 0623 11/18/22 0634   Weight: 145.8 kg (321 lb 7.6 oz) 142 kg (313 lb) (!) 153.8 kg (339 lb)     Vital Signs with Ranges  Temp:  [97.8  F (36.6  C)-97.9  F (36.6  C)] 97.8  F (36.6  C)  Pulse:  [78-89] 89  Resp:  [16] 16  BP: ()/(44-59) 114/44  SpO2:  [97 %-98 %] 98 %  I/O last 3 completed shifts:  In: 50 [P.O.:50]  Out: 200 [Urine:200]    Constitutional: Awake, alert, cooperative, no apparent distress  Respiratory: Clear to auscultation bilaterally, no crackles or wheezing  Cardiovascular: Regular rate and rhythm, normal S1 and S2, and no murmur noted  GI: Normal bowel sounds, soft, non-distended, non-tender, obese  Skin/Integumen: No rashes, no cyanosis, ++4 lower extremity edema  Other:     Medications       ARIPiprazole  10 mg Oral QPM     medroxyPROGESTERone  10 mg Oral Daily     miconazole   Topical BID     OLANZapine  2.5 mg Oral At Bedtime     polyethylene glycol  17 g Oral Daily     senna-docusate  1-2 tablet Oral BID     sertraline  50 mg Oral Daily     sodium chloride (PF)  10-40 mL Intracatheter Q7 Days     sodium chloride (PF)  3 mL Intracatheter Q8H       Data   Recent Labs   Lab 11/18/22  0825 11/18/22  0550 11/17/22  2219 11/17/22  2136 11/17/22  1138 11/17/22  0554 11/16/22  0753 11/16/22  0556 11/15/22  1734 11/15/22  0557 11/13/22 2001 11/13/22  0622   WBC  --   --   --   --   --  9.5  --   --   --  5.7  --   --    HGB  --   --   --   --   --  11.3*  --   --   --  10.7*  --  10.8*   MCV  --   --   --   --   --  86  --   --   --  85  --   --    PLT  --   --   --   --   --  245  --   --   --  253  --   --    NA  --   --   --   --   --  140  --   --   --   --   --   --    POTASSIUM  --  3.5  --   --   --  3.7  --  3.4  --   --    < > 3.0*   CHLORIDE  --   --   --   --   --  109  --   --   --   --   --   --    CO2  --   --   --   --   --  27  --   --    --   --   --   --    BUN  --   --   --   --   --  6*  --   --   --   --   --   --    CR  --   --   --   --   --  0.43*  --   --   --   --   --   --    ANIONGAP  --   --   --   --   --  4  --   --   --   --   --   --    ASHIA  --   --   --   --   --  8.0*  --   --   --   --   --   --    GLC 96  --  98 98   < > 89   < >  --    < >  --    < >  --     < > = values in this interval not displayed.       No results found for this or any previous visit (from the past 24 hour(s)).

## 2022-11-19 NOTE — PROGRESS NOTES
Pt transitioned to comfort cares this afternoon. Carrasquillo is in place and PICC line is patent. Pt has cellulitis on BLE. Will continue to provide comfort cares.

## 2022-11-19 NOTE — PLAN OF CARE
Goal Outcome Evaluation:    Pt is alert, on comfort cares, turn and repositioned, denied pain, no signs of discomfort noted. PICC in place, BLE cellulitis. On calorie count. Will continue to monitor.

## 2022-11-19 NOTE — PROGRESS NOTES
Care Management Follow Up    Length of Stay (days): 129    Expected Discharge Date: 11/28/2022     Concerns to be Addressed:   Discharge planning  Patient plan of care discussed at interdisciplinary rounds: Yes    Anticipated Discharge Disposition: Long Term Care     Anticipated Discharge Services: N/A  Anticipated Discharge DME:  N/A    Patient/family educated on Medicare website which has current facility and service quality ratings:  no  Education Provided on the Discharge Plan:  no  Patient/Family in Agreement with the Plan: yes    Referrals Placed by CM/SW:  SNF referrals  Private pay costs discussed: Not applicable    Additional Information:  Resent referrals, via the discharge navigator, to [facility], [facility], [facility], and [facility].    Will continue to follow.      FADUMO Dc, [facility]    [phone]  [facility]

## 2022-11-20 NOTE — PLAN OF CARE
"Shift Note 11/19/22 [id number]      Comfort care transfer from [facility]. Pt A/Ox1, oriented to person. Delirious but pleasant. 7-9/10 BLE pain, PRN Oxy 2.5 mg, PRN Morphine 5 mg, and PRN Morphine 10 mg given. Denies N/V. Reg diet, minimal oral intake. A2-3 w/ lift, bed bound. T/Rq2. R PICC SL. Carrasquillo in place, low/tea colored UOP. Incont bowel, LBM 11/18. Takes pills whole w/ H2O but stated \"I do not want to swallow anymore\". Cellulitis in BLE. SW, OB/Gyn, osteopath, and dietician following. Discharge pending LTC placement.                               "

## 2022-11-20 NOTE — PLAN OF CARE
Goal Outcome Evaluation:       1/19/2022 [id number]    Patient d/o to time. Slept most of this shift. No acute changes. Bilateral LE edema, compression socks off, legs elevated. Skin red/patty. Turn and repo at 0500. Comfortable. Made comfort cares 11/18. Poor oral intake. Carrasquillo for EOL/retention. Loose BM this morning. Vaginal bleeding/discharge not new.

## 2022-11-20 NOTE — PROGRESS NOTES
Care Management Follow Up    Length of Stay (days): 130    Expected Discharge Date: 11/21/2022     Concerns to be Addressed:       Patient plan of care discussed at interdisciplinary rounds: Yes    Anticipated Discharge Disposition: Long Term Care with hospice     Anticipated Discharge Services:  Long Term Care with hospice  Anticipated Discharge DME:  None    Patient/family educated on Medicare website which has current facility and service quality ratings:    Education Provided on the Discharge Plan:  yes  Patient/Family in Agreement with the Plan: yes    Referrals Placed by CM/SW:    Private pay costs discussed: Not applicable    Additional Information:  Plan is for patient to go to LTC with hospice. Writer resent LTC referrals to facilities that previously declined for no bed available. Writer resent referral to [facility], [facility], and [facility].     Will continue to follow.    JOSE GUADALUPE Ruffin

## 2022-11-20 NOTE — PROGRESS NOTES
[facility]    Hospitalist Progress Note      Assessment & Plan   Cristy Bailey is a 77 year old female with a PMHx significant for morbid obesity; HTN; and HLD; who presented to [facility] 7/13/2022 with complete left-sided paralysis, left-sided facial droop, and difficulty speaking. CTA head remarkable for right MCA occlusion. She was given tenecteplase and subsequently transferred to [facility] 7/13/2022 for thrombectomy.   Hospital stay complicated by development and progression of flank wound.  This subsequently required surgical debridement and placement of wound vac (8/2) that was subsequently removed (8/22).   Prolonged hospitalization related to difficulties finding LTC placement. Complicated by poor intake, malnutrition with weight loss of 100 lbs and feeding tube not within goals of care. Discussed at length with family and opted to pursue comfort care starting 11/18/22.    Comfort care  Failure to thrive  During this hospitalization, discussed concern about overall prognosis, with her eating 0-25% of her meals and nutrition recommendations for alternative forms of nutrition; at that time, she had lost 70 lbs since admit (some of that was probably related to diuresis and some related to variable weights in hospital, but still significant).   Patient and significant other/HCA Leo, reiterated her wishes for DNR/DNI and no feeding tubes.  - 9/28 and 11/7 Leo and Kaylynn were not ready for hospice.  - Had multiple conversations with family Leo and Kaylynn 11/16, 11/17 and 11/18. Further cares hindered by patient's severe malnutrition, she is unable to maintain her glucose, electrolytes and has poor urine output without supplemental IVF. IVF then makes her swelling worse and this becomes cyclical. Given Cristy's clear indication that she DOES NOT WANT a feeding tube, hospice is recommended.  - 11/18, Leo elected to make patient comfort care and attempt to find placement  outside of hospital closer to [place]. Daughter Kaylynn in agreement.  - continue comfort cares     Acute right MCA ischemic stroke with edema and right to left midline shift - status post tenecteplase and subsequent right  MCA mechanical thrombectomy 7/13/22  Presented to [facility] 7/13 with complete paralysis, left-sided facial droop, and aphasia. Head CT 7/13 showed signs of an evolving R MCA infarct. CTA head 7/13 showed a right carotid terminus occlusion, right middle cerebral artery M1 segment occlusion with poor opacification of the more distal right MCA branches/poor collateral flow. CTA neck 7/13 negative for acute occlusions. Pt given tenecteplase 7/13 at 13:11.   Subsequently transferred to [facility] 7/13/2022 where she underwent mechanical thrombectomy. Head CT 7/14 showed evolution of acute infarct involving the R MCA distribution with increased swelling, cortical effacement, and new mild right-to-left midline shift; questionable hypoattenuation involving the bilateral occipital lobes which could be artifactual.    Repeat head CT 7/15 showed evolving R MCA stroke with areas of edema, overall stable.  - stopped ASA/statin given life expectancy    Anxiety  Cognitive impairment with intermittent situational confusion  Metabolic encephalopathy, resolved  *PRN ativan stopped on 08/27/22 as leading to increased confusion. Hydroxyzine stopped as well.   - Continue aripiprazole 10 mg qpm; olanzapine 2.5 mg at bedtime; sertraline 50 mg daily  - Continue PRN olanzapine 5 mg q6h     Lower back/right flank wound/abscess, suspect due to pressure type injury status post surgical debridement on 7/30/2022 and placement of wound vac on 8/2/2022 (subsequently removed 8/22/2022)  Deep tissue pressure injury (DTPI) left medial thigh/groin region, hospital acquired, progressed to HAPI stage 2 pressure injury; related to purwick external catheter tubing (not the purwick device itself)  Pressure injuries, bilateral  buttocks  Intertriginous dermatitis  Osteoarthritis    History of bilateral hip replacements  History of chronic neck and back pain  Patient has chronic back pain, reports having it over the last 2 years. MRI L-spine in 2018 showed multilevel degenerative changes with mild central stenosis.  Had a 5-6cm by 2-3cm wound with swelling/fluid/blistering in a fold of her lower back, did not appear infected, then slowly became infected and required excisional debridement in late July with abx treatment and wound vac (removed 8/22)  - Continue local wound cares per WOC RN  - Continue topical antifungal   - Continue PRN oxycodone  - Continue regular repositioning (often refuses)  - Management of other pressure injuries per WOC recommendations     Severe malnutrition related to acute and chronic medical issues  Morbid obesity with significant weight loss this hospitalization  Hypoglycemia  Nutritionist following. Appetite poor this stay, even with encouragement  As of 10/19, had 107 lb weight loss since admit (374 lbs on 7/14; 267 lbs 10/19); noted some contribution from diuresis for CHF.  Her appetite remains very poor.  She is refusing a feeding tube.  - diet as able, no further monitoring with comfort care     Vaginal bleeding  Abnormal endometrial thickening on US  Pt has had vaginal bleeding intermittently this hospitalization.  Pelvic US 10/8 showed abnormal endometrial thickening to 14 mm.   - OGI gyn followed, plan conservative management with 10mg provera daily, which we will continue for comfort  - if worsening bleeding, can uptitrate dosing     Chronic bilateral lower extremity edema with chronic venous stasis dermatitis and chronic skin changes    Continue leg elevation, lymphedema wraps if agreeable     Constipation    Continue scheduled senna-docusate, polyethylene glycol, PRN bisacodyl     OTHER RESOLVED/CHRONIC ISSUES: no further monitoring or medications  Urinary retention, resolved  Dysphagia due to CVA,  "resolved   Prolonged QTc.  Anemia, suspect dilutional component, resolved  Septic shock, suspect due to bilateral lower extremity cellulitis, left greater than right, resolved  Acute kidney injury, suspect prerenal related to septic shock, resolved  Hypokalemia   Hypomagnesemia    Hypophosphatemia    Volume overload secondary to acute diastolic CHF exacerbation, resolved  History of essential hypertension  Suspected meningioma left parietal region, incidentally seen on admission CT on 7/13/2022  Dyslipidemia  Suspected sleep apnea    Clinically Significant Risk Factors              # Hypoalbuminemia: Lowest albumin = 1.3 g/dL (Ref range: 3.5-5.2) at 11/1/2022  4:19 AM, will monitor as appropriate          # Severe Obesity: Estimated body mass index is 54.72 kg/m  as calculated from the following:    Height as of this encounter: 1.676 m (5' 6\").    Weight as of this encounter: 153.8 kg (339 lb).   # Severe Malnutrition: based on nutrition assessment        DVT Prophylaxis: Pneumatic Compression Devices  Code Status: No CPR- Do NOT Intubate    Expected Discharge Date: 11/28/2022    Discharge Delays: Placement - LTC  *Medically Ready for Discharge    Discharge Comments: LTC placement with hospice unknown     Melissa Howe, DO  Hospitalist Service  [facility]  Securely message with the [employer]oc[employer] Web Console (learn more here)  Text Page (7am - 6pm) via [employer] Paging/Directory      Interval History    Patient seen and examined. She is doing okay today. Requests her light be turned off and her toes lightly covered. No chest pain, shortness of breath. Discussed care with RN    -Data reviewed today: I reviewed all new labs and imaging results over the last 24 hours. I personally reviewed no images or EKG's today.    Physical Exam                      Vitals:    11/16/22 0616 11/17/22 0623 11/18/22 0634   Weight: 145.8 kg (321 lb 7.6 oz) 142 kg (313 lb) (!) 153.8 kg (339 lb)     Vital Signs with " Ranges     I/O last 3 completed shifts:  In: 60 [P.O.:60]  Out: 500 [Urine:500]    Constitutional: Awake, alert, cooperative, no apparent distress  Respiratory: Clear to auscultation bilaterally, no crackles or wheezing  Cardiovascular: Regular rate and rhythm, normal S1 and S2, and no murmur noted  GI: Normal bowel sounds, soft, non-distended, non-tender, obese  Skin/Integumen: No rashes, no cyanosis, ++4 lower extremity edema  Other:     Medications       ARIPiprazole  10 mg Oral QPM     medroxyPROGESTERone  10 mg Oral Daily     miconazole   Topical BID     OLANZapine  2.5 mg Oral At Bedtime     polyethylene glycol  17 g Oral Daily     senna-docusate  1 tablet Oral At Bedtime     sertraline  50 mg Oral Daily     sodium chloride (PF)  10-40 mL Intracatheter Q7 Days     sodium chloride (PF)  3 mL Intracatheter Q8H       Data   Recent Labs   Lab 11/18/22  0825 11/18/22  0550 11/17/22  2219 11/17/22  2136 11/17/22  1138 11/17/22  0554 11/16/22  0753 11/16/22  0556 11/15/22  1734 11/15/22  0557   WBC  --   --   --   --   --  9.5  --   --   --  5.7   HGB  --   --   --   --   --  11.3*  --   --   --  10.7*   MCV  --   --   --   --   --  86  --   --   --  85   PLT  --   --   --   --   --  245  --   --   --  253   NA  --   --   --   --   --  140  --   --   --   --    POTASSIUM  --  3.5  --   --   --  3.7  --  3.4  --   --    CHLORIDE  --   --   --   --   --  109  --   --   --   --    CO2  --   --   --   --   --  27  --   --   --   --    BUN  --   --   --   --   --  6*  --   --   --   --    CR  --   --   --   --   --  0.43*  --   --   --   --    ANIONGAP  --   --   --   --   --  4  --   --   --   --    ASHIA  --   --   --   --   --  8.0*  --   --   --   --    GLC 96  --  98 98   < > 89   < >  --    < >  --     < > = values in this interval not displayed.       No results found for this or any previous visit (from the past 24 hour(s)).

## 2022-11-21 NOTE — PLAN OF CARE
[id number]  Comfort cares, formal assessment deferred. C/o pain in legs/feet, very sensitive to touch as well as back pain. Roxanol given every 2-3 hours. Slept some of shift, but yells for help when awake, then unable to express what help she needs. Alert to self and place. LE edema 4+. Carrasquillo with minimal output, small loose BM overnight, hold stool softeners. Miconazole powder in joesph area. Up with lift. Discharge pending placement.

## 2022-11-21 NOTE — PROGRESS NOTES
Care Management Follow Up    Length of Stay (days): 131    Expected Discharge Date: 11/22/2022     Concerns to be Addressed:       Patient plan of care discussed at interdisciplinary rounds: Yes    Anticipated Discharge Disposition: Long Term Care/Hospice facilities      Anticipated Discharge Services:  Long term care/Hospice facilities   Anticipated Discharge DME:  None    Patient/family educated on Medicare website which has current facility and service quality ratings:    Education Provided on the Discharge Plan:  yes  Patient/Family in Agreement with the Plan: yes    Referrals Placed by CM/SW:   Long Term Care/Hospice facilities   Private pay costs discussed: Not applicable    Additional Information:  Writer sent a referral via DOD to [facility] referral.    Addendum 1515: Writer reached out to hospitalist to see if GIP consult is needed since patient isn't eating.     Will continue to follow.    JOSE GUADALUPE Ruffin

## 2022-11-21 NOTE — PLAN OF CARE
Goal Outcome Evaluation:       Patient is disoriented to time, slow responses. Lethargic and slept throughout most of shift. Yelling out when needing anything. Can be demanding and particular with cares. Refusing turns and refusing wound cares. BLE edema +4. BLE extremely tender to touch. Comfort cares status, formal assessment and vitals deferred. Carrasquillo with minimal cloudy output. Gave Roxanol for pain, effective. Gave zyprexa for restlessness. Refusing wound cares, turns and some medications. Minimal vagina discharge noted. Wounds on coccyx, perineum, and thighs.

## 2022-11-21 NOTE — CONSULTS
[facility]     Progress Note - [facility]     ______________________________________________________________________     [facility] Contact Number: [phone]    - Providers: Please contact [facility] with changes in orders or clinical plan of care   - Nursing: Please contact [facility] with significant changes in patient condition  ______________________________________________________________________           [facility] would like to thank you for the [facility]-IP referral.       Referral is being verified for insurance coverage by [facility] hospice intake. Review of eligibility with intake team and medical director is currently underway.         Yue Pritchett, RN  [phone]

## 2022-11-21 NOTE — PROGRESS NOTES
Care Management Follow Up    Length of Stay (days): 131    Expected Discharge Date: 11/22/2022     Concerns to be Addressed:       Patient plan of care discussed at interdisciplinary rounds: Yes    Anticipated Discharge Disposition: Long Term Care     Anticipated Discharge Services:    Anticipated Discharge DME:      Patient/family educated on Medicare website which has current facility and service quality ratings:    Education Provided on the Discharge Plan:    Patient/Family in Agreement with the Plan: yes    Referrals Placed by CM/SW:    Private pay costs discussed:N/A    Additional Information:  Writer is assisting floor . Writer called [facility] and left a  for admissions asking for a call back to determine if they can accept patient for hospice placement. Care management will continue to follow.     Hali Bender, FADUMO, LGSW   Social Work   [facility]

## 2022-11-21 NOTE — PROGRESS NOTES
[facility]    Hospitalist Progress Note    Date of Service: 11/21/2022    Assessment & Plan   Cristy Bailey is a 77 year old female with a PMHx significant for morbid obesity; HTN; and HLD; who [facility] with complete left-sided paralysis, left-sided facial droop, and difficulty speaking. CTA head remarkable for right MCA occlusion. She was given tenecteplase and subsequently transferred to [facility] 7/13/2022 for thrombectomy.   Hospital stay complicated by development and progression of flank wound.  This subsequently required surgical debridement and placement of wound vac (8/2) that was subsequently removed (8/22).   Prolonged hospitalization related to difficulties finding LTC placement. Complicated by poor intake, malnutrition with weight loss of 100 lbs and feeding tube not within goals of care. Discussed at length with family and opted to pursue comfort care starting 11/18/22.    Comfort care  Failure to thrive  During this hospitalization, discussed concern about overall prognosis, with her eating 0-25% of her meals and nutrition recommendations for alternative forms of nutrition; at that time, she had lost 70 lbs since admit (some of that was probably related to diuresis and some related to variable weights in hospital, but still significant).   Patient and significant other/HCA Leo, reiterated her wishes for DNR/DNI and no feeding tubes.  - 9/28 and 11/7 Leo and Kaylynn were not ready for hospice.  - Had multiple conversations with family Leo and Kaylynn 11/16, 11/17 and 11/18. Further cares hindered by patient's severe malnutrition, she is unable to maintain her glucose, electrolytes and has poor urine output without supplemental IVF. IVF then makes her swelling worse and this becomes cyclical. Given Cristy's clear indication that she DOES NOT WANT a feeding tube, hospice is recommended.  - 11/18, Leo elected to make patient comfort care  and attempt to find placement outside of hospital closer to [place]. Daughter Kaylynn in agreement.  - continue comfort cares     Acute right MCA ischemic stroke with edema and right to left midline shift - status post tenecteplase and subsequent right  MCA mechanical thrombectomy 7/13/22  Presented to [facility] 7/13 with complete paralysis, left-sided facial droop, and aphasia. Head CT 7/13 showed signs of an evolving R MCA infarct. CTA head 7/13 showed a right carotid terminus occlusion, right middle cerebral artery M1 segment occlusion with poor opacification of the more distal right MCA branches/poor collateral flow. CTA neck 7/13 negative for acute occlusions. Pt given tenecteplase 7/13 at 13:11.   Subsequently transferred to [facility] 7/13/2022 where she underwent mechanical thrombectomy. Head CT 7/14 showed evolution of acute infarct involving the R MCA distribution with increased swelling, cortical effacement, and new mild right-to-left midline shift; questionable hypoattenuation involving the bilateral occipital lobes which could be artifactual.    Repeat head CT 7/15 showed evolving R MCA stroke with areas of edema, overall stable.  - stop further ASA/statin given life expectancy    Anxiety  Cognitive impairment with intermittent situational confusion  Metabolic encephalopathy, resolved  *PRN ativan stopped on 08/27/22 as leading to increased confusion. Hydroxyzine stopped as well.   - Continue aripiprazole 10 mg qpm; olanzapine 2.5 mg at bedtime; sertraline 50 mg daily  - Continue PRN olanzapine 5 mg q6h     Lower back/right flank wound/abscess, suspect due to pressure type injury status post surgical debridement on 7/30/2022 and placement of wound vac on 8/2/2022 (subsequently removed 8/22/2022)  Deep tissue pressure injury (DTPI) left medial thigh/groin region, hospital acquired, progressed to HAPI stage 2 pressure injury; related to purwick external catheter tubing (not the purwick device  itself)  Pressure injuries, bilateral buttocks  Intertriginous dermatitis  Osteoarthritis    History of bilateral hip replacements  History of chronic neck and back pain  Patient has chronic back pain, reports having it over the last 2 years. MRI L-spine in 2018 showed multilevel degenerative changes with mild central stenosis.  Had a 5-6cm by 2-3cm wound with swelling/fluid/blistering in a fold of her lower back, did not appear infected, then slowly became infected and required excisional debridement in late July with abx treatment and wound vac (removed 8/22)  - Continue local wound cares per WOC RN  - Continue topical antifungal   - Continue PRN oxycodone  - Continue regular repositioning (often refuses)  - Management of other pressure injuries per [facility] recommendations     Severe malnutrition related to acute and chronic medical issues  Morbid obesity with significant weight loss this hospitalization  Hypoglycemia  Nutritionist following. Appetite poor this stay, even with encouragement  As of 10/19, had 107 lb weight loss since admit (374 lbs on 7/14; 267 lbs 10/19); noted some contribution from diuresis for CHF.  Her appetite remains very poor.  She is refusing a feeding tube.  - diet as able, no further monitoring with comfort care     Vaginal bleeding  Abnormal endometrial thickening on US  Pt has had vaginal bleeding intermittently this hospitalization.  Pelvic US 10/8 showed abnormal endometrial thickening to 14 mm.   - OGI gyn followed, plan conservative management with 10mg provera daily, which we will continue for comfort  - if worsening bleeding, can uptitrate dosing     Chronic bilateral lower extremity edema with chronic venous stasis dermatitis and chronic skin changes    Continue leg elevation, lymphedema wraps if agreeable     Constipation    Continue scheduled senna-docusate, polyethylene glycol, PRN bisacodyl     OTHER RESOLVED/CHRONIC ISSUES: no further monitoring or medications  Urinary  "retention, resolved  Dysphagia due to CVA, resolved   Prolonged QTc.  Anemia, suspect dilutional component, resolved  Septic shock, suspect due to bilateral lower extremity cellulitis, left greater than right, resolved  Acute kidney injury, suspect prerenal related to septic shock, resolved  Hypokalemia   Hypomagnesemia    Hypophosphatemia    Volume overload secondary to acute diastolic CHF exacerbation, resolved  History of essential hypertension  Suspected meningioma left parietal region, incidentally seen on admission CT on 7/13/2022  Dyslipidemia  Suspected sleep apnea    Clinically Significant Risk Factors              # Hypoalbuminemia: Lowest albumin = 1.3 g/dL (Ref range: 3.5-5.2) at 11/1/2022  4:19 AM, will monitor as appropriate          # Severe Obesity: Estimated body mass index is 54.72 kg/m  as calculated from the following:    Height as of this encounter: 1.676 m (5' 6\").    Weight as of this encounter: 153.8 kg (339 lb).   # Severe Malnutrition: based on nutrition assessment        DVT Prophylaxis: Pneumatic Compression Devices  Code Status: No CPR- Do NOT Intubate     Expected Discharge Date: 11/22/2022    Discharge Delays: Placement - LTC  *Medically Ready for Discharge    Discharge Comments: LTC placement with hospice unknown     Melissa Hoew DO  Hospitalist Service  [facility]  Securely message with the Vocera Web Console (learn more here)  Text Page (7am - 6pm) via [email or web address] Paging/Directory      Interval History      No acute events overnight  No complaints today  No CP/SOB    -Data reviewed today: I reviewed all new labs and imaging results over the last 24 hours. I personally reviewed no images or EKG's today.    Physical Exam                      Vitals:    11/16/22 0616 11/17/22 0623 11/18/22 0634   Weight: 145.8 kg (321 lb 7.6 oz) 142 kg (313 lb) (!) 153.8 kg (339 lb)     Vital Signs with Ranges     No intake/output data recorded.    Constitutional: Awake, alert, " cooperative, no apparent distress  Respiratory: Clear to auscultation bilaterally, no crackles or wheezing  Cardiovascular: Regular rate and rhythm, normal S1 and S2, and no murmur noted  GI: Normal bowel sounds, soft, non-distended, non-tender, obese  Skin/Integumen: No rashes, no cyanosis, ++4 lower extremity edema  Other:     Medications       ARIPiprazole  10 mg Oral QPM     medroxyPROGESTERone  10 mg Oral Daily     miconazole   Topical BID     OLANZapine  2.5 mg Oral At Bedtime     polyethylene glycol  17 g Oral Daily     senna-docusate  1 tablet Oral At Bedtime     sertraline  50 mg Oral Daily     sodium chloride (PF)  10-40 mL Intracatheter Q7 Days       Data   Recent Labs   Lab 11/18/22  0825 11/18/22  0550 11/17/22  2219 11/17/22  2136 11/17/22  1138 11/17/22  0554 11/16/22  0753 11/16/22  0556 11/15/22  1734 11/15/22  0557   WBC  --   --   --   --   --  9.5  --   --   --  5.7   HGB  --   --   --   --   --  11.3*  --   --   --  10.7*   MCV  --   --   --   --   --  86  --   --   --  85   PLT  --   --   --   --   --  245  --   --   --  253   NA  --   --   --   --   --  140  --   --   --   --    POTASSIUM  --  3.5  --   --   --  3.7  --  3.4  --   --    CHLORIDE  --   --   --   --   --  109  --   --   --   --    CO2  --   --   --   --   --  27  --   --   --   --    BUN  --   --   --   --   --  6*  --   --   --   --    CR  --   --   --   --   --  0.43*  --   --   --   --    ANIONGAP  --   --   --   --   --  4  --   --   --   --    ASHIA  --   --   --   --   --  8.0*  --   --   --   --    GLC 96  --  98 98   < > 89   < >  --    < >  --     < > = values in this interval not displayed.       No results found for this or any previous visit (from the past 24 hour(s)).

## 2022-11-22 NOTE — PROGRESS NOTES
Pt passed away 8.20 am .Crescencio PEARSON updated .Family updated .All the belongings send with the security.All the paperwork done by charge.[facility] line and snyder removed.

## 2022-11-22 NOTE — PLAN OF CARE
[id number]  Comfort cares, full assessment deferred. Somnolent for all of shift. Periods of apnea noted. Roxanol given x 2. Up with lift. Turn and repo q 2 hours. Carrasquillo with minimal output, minimal vaginal bleeding. Discharge pending placement.

## 2022-11-22 NOTE — DEATH PRONOUNCEMENT
MD DEATH PRONOUNCEMENT    Called to pronounce Cristy Bailey dead.    Physical Exam: Spontaneous respirations absent, Breath sounds absent, Carotid pulse absent, Heart sounds absent and Pupillary light reflex absent    Patient was pronounced dead at 8:20 AM, 2022.    Preliminary Cause of Death: Cardiopulmonary arrest in the setting of comfort cares related to Acute Right MCA ischemic stroke with cerebral edema in right left midline shift s/p mechanical thrombectomy, with hospital course complicated by malnutrition with 100 lb weight loss and feeding tube not within goals of care.    Active Problems:    CVA (cerebral vascular accident) (H)       Infectious disease present?: NO    Communicable disease present? (examples: HIV, chicken pox, TB, Ebola, CJD) :  NO    Multi-drug resistant organism present? (example: MRSA): NO    Please consider an autopsy if any of the following exist:  NO Unexpected or unexplained death during or following any dental, medical, or surgical diagnostic treatment procedures.   NO Death of mother at or up to seven days after delivery.     NO All  and pediatric deaths.     NO Death where the cause is sufficiently obscure to delay completion of the death certificate.   NO Deaths in which autopsy would confirm a suspected illness/condition that would affect surviving family members or recipients of transplanted organs.     The following deaths must be reported to the 's Office:  NO A death that may be due entirely or in part to any factors other than natural disease (recent surgery, recent trauma, suspected abuse/neglect).   NO A death that may be an accident, suicide, or homicide.     NO Any sudden, unexpected death in which there is no prior history of significant heart disease or any other condition associated with sudden death.   NO A death under suspicious, unusual, or unexpected circumstances.    NO Any death which is apparently due to natural causes but in  which the  does not have a personal physician familiar with the patient s medical history, social, or environmental situation or the circumstances of the terminal event.   NO Any death apparently due to Sudden Infant Death Syndrome.     NO Deaths that occur during, in association with, or as consequences of a diagnostic, therapeutic, or anesthetic procedure.   NO Any death in which a fracture of a major bone has occurred within the past (6) six months.   NO A death of persons note seen by their physician within 120 days of demise.     NO Any death in which the  was an inmate of a public institution or was in the custody of Law Enforcement personnel.   NO  All unexpected deaths of children   NO Solid organ donors   NO Unidentified bodies   YES Deaths of persons whose bodies are to be cremated or otherwise disposed of so that the bodies will later be unavailable for examination;   NO Deaths unattended by a physician outside of a licensed healthcare facility or licensed residential hospice program   NO Deaths occurring within 24 hours of arrival to a health care facility if death is unexpected.    NO Deaths associated with the decedent s employment.   NO Deaths attributed to acts of terrorism.   NO Any death in which there is uncertainty as to whether it is a medical examiner s care should be discussed with the medical investigator.        Body disposition: Autopsy was discussed with family member:  Spouse Leo by phone.  Permission for autopsy was declined.    Telly Cameron, CNP  Hospitalist and House [facility]  Pager: [phone]

## 2022-11-22 NOTE — CONSULTS
[facility]    Consult Note - [facility]    ______________________________________________________________________    [facility] Contact Number: [phone]    ______________________________________________________________________        Cristy is currently ineligible for inpatient hospice.    Rationale:  Currentlly patient has no unmanaged symptoms. If patient has remarkable changes please call and Hospice team will re- evaluate patient at that time.     Ineligibility Status Discussed with the Following:   - Nurse: Dasha Lou  - Hospitalist/Rounding Provider:   Steffen Purcell MD    Hospitalist - Internal Medicine    Since 11/20/2022    [phone]       - Cristy's Family/Preferred Contact: Leo Akbar (Significant other)   [phone] (Home Phone  - Hospice Provider:Dr. Mervin Stevens  - Hospice Social Worker: {Elin Francis     Is Cristy eligible for hospice on Discharge?  yes    Yue Pritchett, RN

## 2022-11-22 NOTE — PROGRESS NOTES
Care Management Follow Up    Length of Stay (days): 132    Expected Discharge Date: 11/22/2022     Concerns to be Addressed:       Patient plan of care discussed at interdisciplinary rounds: Yes    Anticipated Discharge Disposition: Long Term Care     Anticipated Discharge Services:    Anticipated Discharge DME:      Patient/family educated on Medicare website which has current facility and service quality ratings:    Education Provided on the Discharge Plan:    Patient/Family in Agreement with the Plan: yes    Referrals Placed by CM/SW:    Private pay costs discussed: Not applicable    Additional Information:  Return call from [facility] reporting pt is over their 250lb weight limit, so they cannot accept her.       JOSE GUADALUPE Medina

## 2022-11-22 NOTE — PLAN OF CARE
Alert to self. C/o pain in legs/fee & stomach, pt very sensitive to touch; PRN roxanol given q2h. Slept majority of shift, pt not alert enough to follow commands to eat or swallow pills. Regular diet, needs assistance. Up A2-3 w/ lift, T/R q2hr. Pt refusing frequently. No intake this shift, pt not alert enough to safely eat/drink. Carrasquillo in place w/ minimal output - positional & not draining at times. BS @ 1230 = 0; 1700 = 5ml. Cont to monitor. No BM this shift. Miconazole powder applied to joesph area. 4+ edema to BLE, very sensitive to touch. Pt refusing lymph wraps. Pt does not qualify for GIP hospice. Discharge pending placement.

## 2022-11-25 NOTE — DISCHARGE SUMMARY
[facility]    Death Summary - Hospitalist Service     Date of Admission:  7/13/2022  Date of Death: 11/22/2022 11:15 AM  Discharging Provider: Steffen Purcell MD    Discharge Diagnoses     Acute right MCA ischemic stroke with edema and right to left midline shift - status post tenecteplase and subsequent right  MCA mechanical thrombectomy 7/13/22    Cause of death: Acute hypoxic Respiratory Failure    Hospital Course      Cristy Bailey is a 77 year old female with a PMHx significant for morbid obesity; HTN; and HLD; who presented to [facility] 7/13/2022 with complete left-sided paralysis, left-sided facial droop, and difficulty speaking. CTA head remarkable for right MCA occlusion. She was given tenecteplase and subsequently transferred to [facility] 7/13/2022 for thrombectomy.   Hospital stay complicated by development and progression of flank wound.  This subsequently required surgical debridement and placement of wound vac (8/2) that was subsequently removed (8/22).   Prolonged hospitalization related to difficulties finding LTC placement. Complicated by poor intake, malnutrition with weight loss of 100 lbs and feeding tube not within goals of care. Discussed at length with family and opted to pursue comfort care starting 11/18/22.     Comfort care  Failure to thrive  During this hospitalization, discussed concern about overall prognosis, with her eating 0-25% of her meals and nutrition recommendations for alternative forms of nutrition; at that time, she had lost 70 lbs since admit (some of that was probably related to diuresis and some related to variable weights in hospital, but still significant).   Patient and significant other/HCA Leo, reiterated her wishes for DNR/DNI and no feeding tubes.  - 9/28 and 11/7 Leo and Kaylynn were not ready for hospice.  - Had multiple conversations with family Leo and Kaylynn 11/16, 11/17 and 11/18. Further cares hindered by patient's severe  malnutrition, she is unable to maintain her glucose, electrolytes and has poor urine output without supplemental IVF. IVF then makes her swelling worse and this becomes cyclical. Given Cristy's clear indication that she DOES NOT WANT a feeding tube, hospice is recommended.  - 11/18, Leo elected to make patient comfort care.     Acute right MCA ischemic stroke with edema and right to left midline shift - status post tenecteplase and subsequent right  MCA mechanical thrombectomy 7/13/22  Presented to [facility] 7/13 with complete paralysis, left-sided facial droop, and aphasia. Head CT 7/13 showed signs of an evolving R MCA infarct. CTA head 7/13 showed a right carotid terminus occlusion, right middle cerebral artery M1 segment occlusion with poor opacification of the more distal right MCA branches/poor collateral flow. CTA neck 7/13 negative for acute occlusions. Pt given tenecteplase 7/13 at 13:11.   Subsequently transferred to [facility] 7/13/2022 where she underwent mechanical thrombectomy. Head CT 7/14 showed evolution of acute infarct involving the R MCA distribution with increased swelling, cortical effacement, and new mild right-to-left midline shift; questionable hypoattenuation involving the bilateral occipital lobes which could be artifactual.    Repeat head CT 7/15 showed evolving R MCA stroke with areas of edema, overall stable.  - stop further ASA/statin given life expectancy     Anxiety  Cognitive impairment with intermittent situational confusion  Metabolic encephalopathy, resolved     Lower back/right flank wound/abscess, suspect due to pressure type injury status post surgical debridement on 7/30/2022 and placement of wound vac on 8/2/2022 (subsequently removed 8/22/2022)  Deep tissue pressure injury (DTPI) left medial thigh/groin region, hospital acquired, progressed to HAPI stage 2 pressure injury; related to purwick external catheter tubing (not the purwick device itself)  Pressure injuries,  bilateral buttocks  Intertriginous dermatitis  Osteoarthritis    History of bilateral hip replacements  History of chronic neck and back pain  Patient has chronic back pain, reports having it over the last 2 years. MRI L-spine in 2018 showed multilevel degenerative changes with mild central stenosis.  Had a 5-6cm by 2-3cm wound with swelling/fluid/blistering in a fold of her lower back, did not appear infected, then slowly became infected and required excisional debridement in late July with abx treatment and wound vac (removed 8/22)     Severe malnutrition related to acute and chronic medical issues  Morbid obesity with significant weight loss this hospitalization  Hypoglycemia  Nutritionist following. Appetite poor this stay, even with encouragement  As of 10/19, had 107 lb weight loss since admit (374 lbs on 7/14; 267 lbs 10/19); noted some contribution from diuresis for CHF.     Vaginal bleeding  Abnormal endometrial thickening on US  Pt has had vaginal bleeding intermittently this hospitalization.  Pelvic US 10/8 showed abnormal endometrial thickening to 14 mm.      Chronic bilateral lower extremity edema with chronic venous stasis dermatitis and chronic skin changes    Constipation     OTHER RESOLVED/CHRONIC ISSUES: no further monitoring or medications  Urinary retention, resolved  Dysphagia due to CVA, resolved   Prolonged QTc.  Anemia, suspect dilutional component, resolved  Septic shock, suspect due to bilateral lower extremity cellulitis, left greater than right, resolved  Acute kidney injury, suspect prerenal related to septic shock, resolved  Hypokalemia   Hypomagnesemia    Hypophosphatemia    Volume overload secondary to acute diastolic CHF exacerbation, resolved  History of essential hypertension  Suspected meningioma left parietal region, incidentally seen on admission CT on 7/13/2022  Dyslipidemia  Suspected sleep apnea       Steffen Purcell MD  [facility]  Hospital  ______________________________________________________________________      Significant Results and Procedures   Most Recent 3 CBC's:  Recent Labs   Lab Test 11/17/22  0554 11/15/22  0557 11/13/22  0622 11/12/22  1641 11/10/22  0629 11/06/22  0621   WBC 9.5 5.7  --   --   --  7.9   HGB 11.3* 10.7* 10.8*   < >  --  12.7   MCV 86 85  --   --   --  86    253  --   --  291 282    < > = values in this interval not displayed.     Most Recent 3 BMP's:  Recent Labs   Lab Test 11/18/22  0825 11/18/22  0550 11/17/22  2219 11/17/22  2136 11/17/22  1138 11/17/22  0554 11/16/22  0753 11/16/22  0556 11/09/22  1542 11/09/22  0651 11/07/22  0539 11/06/22  0621   NA  --   --   --   --   --  140  --   --   --  141  --  141   POTASSIUM  --  3.5  --   --   --  3.7  --  3.4   < > 4.2  4.0   < > 3.1*   CHLORIDE  --   --   --   --   --  109  --   --   --  109  --  109   CO2  --   --   --   --   --  27  --   --   --  25  --  23   BUN  --   --   --   --   --  6*  --   --   --  11  --  15   CR  --   --   --   --   --  0.43*  --   --   --  0.46*  --  0.68   ANIONGAP  --   --   --   --   --  4  --   --   --  7  --  9   ASHIA  --   --   --   --   --  8.0*  --   --   --  7.8*  --  8.0*   GLC 96  --  98 98   < > 89   < >  --    < > 65*   < > 87    < > = values in this interval not displayed.     Most Recent 2 LFT's:  Recent Labs   Lab Test 11/01/22  0419 11/15/21  0801   AST 30 28   ALT 14 39   ALKPHOS 102 83   BILITOTAL 0.5 0.4     Most Recent 3 INR's:  Recent Labs   Lab Test 07/14/22  0510 07/13/22  1208 09/04/19  0000   INR 1.07 1.04 3.4*   ,   Results for orders placed or performed during the hospital encounter of 07/13/22   IR Carotid Cerebral Angiogram Bilateral    Narrative    LUCIANO JONES  [id number]  1945    History: 77 year old female with?past medical history of hypertension,  hyperlipidemia, morbid obesity presenting to the [facility] ED with  proximal right M1 occlusion.s/p TNK presenting for emergent  stroke  thrombectomy.?    Indication for the procedure: As above     [place]ak: DELMY Padilla  Anesthesia: Local and general anesthesia  Contrast used: 48 cc of VISI 320  Fluoro time: 90 minutes, 1849.44 mGy  Sedation time: Not applicable   Sedatives: PTA Propofol gtt  Other medications: Radial artery cocktail (Heparin 3000 IU + 200mcg  Nitroglycerine + 2.5mg Verapamil)   ?  Procedure:  1.  Emergent cerebral angiography and interpretation of the images.  2.  Ultrasound guided right radial arteriotomy with permanent image of  the anatomy stored in the electronic medical record.  3.  Selective catheterization and diagnostic cerebral angiography of  the right internal carotid artery.  4.  Mechanical thrombectomy of right middle cerebral artery.   5.  Percutaneous closure of right radial arteriotomy using a TR band.    Consent: The risks, benefits of a conventional diagnostic cerebral  angiography were discussed with the patient who agreed to proceed. The  risks, which were discussed included risk of stroke, arterial  dissection, wrist/groin hematoma, arteriovenous fistula in the  wrist/groin and pseudoaneurysm of the artery at the arteriotomy site.  Subsequently, verbal and written informed consent was obtained.    Technique: The patient was brought to the angiography suite and placed  in supine position. The medications were administered by the radiology  nursing staff. The nursing staff independently monitored the patient's  vital signs during the procedure.    The patient 's right wrist was prepped and draped in standard fashion.  The right radial ?artery was palpated. Ultrasound was used to image  the radial artery. The right radial artery was shown to be patent.  Lidocaine was injected locally. Using real-time ultrasound guidance, a  21 gauge micropuncture needle was placed into the right radial artery  which was exchanged for a 6French Slender Terumo sheath over a  0.018inch guidewire. The sheath was  connected to a continuous flush of  heparinized saline. A hard copy was stored in the patient's record.  Through the 6 Djib[place]ian short sheath, we now advanced a 6 [place]ib[place]ian  benchmark guide catheter over a beacon tip Trejo 2 diagnostic  catheter and a 0.035 inch Terumo Glidewire. ?The guide catheter was  now advanced over the diagnostic catheter/Glidewire into the right  common and subsequently the right internal carotid arteries. ?The  diagnostic catheter and Glidewire were now removed. ?The guide  catheter was back flushed and connected to a continuous flush of  heparinized saline. ?Double flush technique was used throughout the  procedure. ?Under fluoroscopic guidance using roadmap technique, we  now advanced a 5 [place]ib[place]ian Domi distal access catheter over a marksman  microcatheter and a 0.014 inch Synchro 2 standard microwire into the  right middle cerebral artery. ?The microwire was now removed and  microcatheter angiography was performed to confirm the positioning of  her microcatheter. ?We then proceeded to perform our first pass of  mechanical thrombectomy. ?A 6 mm X 40 mm Solitaire stent retriever was  advanced through the microcatheter and subsequently retrieved under  negative aspiration using a 60 cc syringe. ?Subsequent angiography  revealed persistent occlusion of the M1 segment of the right middle  cerebral artery. ?At this point, we reintroduced the Domi distal  access catheter over the microcatheter/microwire into the right middle  cerebral artery. ?The microcatheter/microwire were removed and direct  manual aspiration using a 60 cc syringe was performed. ?The Domi  intermediate catheter was now retrieved from the body under negative  aspiration. ?Subsequent angiography revealed reduced clot burden in  the right M1 MCA as well as improved flow through the right anterior  cerebral artery. ?At this point, we reintroduced  our intermediate  catheter over the microcatheter/microwire as detailed above  and  proceeded to perform a third pass of mechanical thrombectomy. ?Once  again, the Domi intermediate catheter was withdrawn from the body  under negative aspiration with subsequent angiography revealing a  partially occlusive thrombus in the right M1 MCA. ?A fourth and final  mechanical thrombectomy pass was performed as detailed above resulting  in complete (mTICI III)?recanalization of the right cerebral  hemisphere.  ?  Findings:  Right subclavian artery injection: Cervical view  With the diagnostic catheter in the right subclavian artery, AP  angiography was performed demonstrating tortuosity of the right  subclavian and brachiocephalic arteries. ?The right common carotid  artery and the right vertebral artery appeared normal in the proximal  segments.  ?  Right internal carotid artery injection: Cervical view  With the 6 Malaysian benchmark guide catheter in the proximal right  internal carotid artery, biplane angiography was performed over the  neck in  oblique projection confirming the positioning of our guide  catheter within the right ICA. ?The right external carotid artery and  its visualized branches appear normal.  ?  Right internal carotid artery injection: Cranial view  With our guide catheter in the right internal carotid artery, biplane  angiography was performed over the cranium in AP and lateral  projections. ?The right internal carotid artery is normal in its  cervical, petrous, laceral,?cavernous, ophthalmic and communicating  segments. ?The right internal carotid artery has a abrupt cut off at  its origin with slightly delayed filling of the right anterior  cerebral artery, suggesting a right carotid terminus??right M1  thrombus. ?The right posterior communicating artery is normal and  demonstrates contrast washout secondary to competitive flow.  ?  Series #6 microcatheter angiography performed via the marksman  microcatheter in the frontal M2 division of the right middle  cerebral  artery.  ?  Series #7 cerebral angiography performed through the guide catheter  after first pass of stent?retriever and manual?aspiration mechanical  thrombectomy. ?Nonocclusive vasospasm noted in the cervical segment of  the right internal carotid artery.  ?  Series #8 cerebral angiography performed after second pass of  aspiration-only thrombectomy demonstrating improved recanalization of  the proximal right M1 segment and improved flow through the right  anterior cerebral artery.  ?  Series #9 cerebral angiography performed after third pass of manual  aspiration?only mechanical thrombectomy demonstrating significantly  improved recanalization of the right middle cerebral artery with a  partially occlusive thrombus in the distal right M1 MCA.  ?  Series #10 cerebral angiography of the fourth pass of aspiration?only  mechanical thrombectomy demonstrating?mTICI III?recannulization.  ?  Series #12?cervical angiography at the end of the procedure  demonstrates resolution of vasospasm without any evidence of  dissection in the right internal carotid artery.  ?    Upon completion of the procedure the 5 Swiss sheath was removed.  Hemostasis was obtained with a TR band. Procedure was completed  without any complications. Patient was then transferred to ICU in  stable condition.    Dr Jaramillo was present for the entire procedure.      Impression    Impression:  Successful mechanical thrombectomy of right M1/carotid-T thrombus with  mTICI III recannulization.     Spencer Bonilla MD   Endovascular Surgical Neuroradiology Fellow  Pager: [phone].      I have reviewed all images and agree with the interpretation.  I also  attest that I was present for the entire procedure and agree with the  operative report.    I have personally reviewed the examination and initial interpretation  and I agree with the findings.    PERCY JARAMILLO MD         SYSTEM ID:  [id number]   CT Head w/o Contrast    Narrative    EXAM: CT  HEAD W/O CONTRAST  LOCATION: [facility]  DATE/TIME: 7/13/2022 4:52 PM    INDICATION: Follow-up right middle cerebral artery stroke status post mechanical thrombectomy.  COMPARISON: 07/13/2022.  TECHNIQUE: Routine CT Head without IV contrast. Multiplanar reformats. Dose reduction techniques were used.    FINDINGS:  INTRACRANIAL CONTENTS: No intracranial hemorrhage, extraaxial collection, or mass effect.  No CT evidence of acute infarct. There is low-attenuation more prominent in interval involving the right insular cortex and the right temporal lobe. There does   appear to be better visualization of the right basal ganglia. There is also scattered diffuse low attenuation within the periventricular and subcortical white matter consistent with diffuse small vessel ischemic disease. There is a stable small calcified   presumed meningioma in the posterior left parietal region with the surrounding brain parenchyma unremarkable. The ventricular system, basal cisterns and the cortical sulci are with diffuse volume loss.     VISUALIZED ORBITS/SINUSES/MASTOIDS: No intraorbital abnormality. No paranasal sinus mucosal disease. No middle ear or mastoid effusion.    BONES/SOFT TISSUES: No acute abnormality.      Impression    IMPRESSION:  1.  Early ischemia right insular cortex and right temporal lobe consistent with right middle cerebral artery territory.  2.  No evidence of midline shift or hemorrhage.  3.  Stable diffuse age related changes.   CT Head w/o Contrast    Narrative    CT SCAN OF THE HEAD WITHOUT CONTRAST   7/14/2022 12:14 PM     HISTORY: Right M1 occlusion status post TNK and thrombectomy.    TECHNIQUE:  Axial images of the head and coronal reformations without  IV contrast material. Radiation dose for this scan was reduced using  automated exposure control, adjustment of the mA and/or kV according  to patient size, or iterative reconstruction technique. Dual energy  technique was  performed.    COMPARISON: Head CT 7/14/2022    FINDINGS:  Acute infarct involving the right middle cerebral artery  distribution has evolved compared to the prior exam with increased  sulcal effacement and parenchymal swelling corresponding to the  infarcted areas within the right basal ganglia, right insula, right  temporal lobe, and right occipital lobe. There is new partial  effacement of the frontal horn of the right lateral ventricle with  subtle right-to-left midline shift (approximately 3 mm), new compared  to the prior exam. No evidence of hemorrhage. Small dural-based  calcified lesion along the left parietal lobe, likely incidental  meningioma.    The visualized calvarium, tympanic cavities, and mastoid kidneys are  unremarkable. Mild paranasal sinus mucosal thickening.      Impression    IMPRESSION:   1. Evolution of acute infarct involving the right middle cerebral  artery distribution with increased swelling, cortical effacement, and  new mild right-to-left midline shift. Recommend close follow-up.  2. No evidence of hemorrhage.  3. Questionable hypoattenuation involving the bilateral occipital  lobes which could be artifactual (MRI could be performed).  4. Small incidental meningioma along the left parietal lobe.    BRYCE MCKEON MD         SYSTEM ID:  [id number]   CT Head w/o Contrast    Narrative    CT SCAN OF THE HEAD WITHOUT CONTRAST  July 15, 2022 8:14 AM     HISTORY: Stroke follow-up. Altered mental status.    TECHNIQUE: Axial images of the head and coronal reformations without  IV contrast material. Radiation dose for this scan was reduced using  automated exposure control, adjustment of the mA and/or kV according  to patient size, or iterative reconstruction technique.    COMPARISON: Several prior comparisons, most recent head CT 7/14/2022.    FINDINGS: Hypodense infarcts with evidence of regional edema in the  infarcts in the right basal ganglia and right temporal and occipital  regions. These  appear similar to head CT 7/14/2022. Persistent mass  effect on the right lateral ventricle. No significant midline shift or  herniation. Persistent crowding of the basal cisterns. No acute  intracranial hemorrhage appreciated. Partially calcified extra-axial  lesion along the posterior left cerebral convexity likely representing  a meningioma is unchanged.    Small polypoid mucosal lesion in the medial right maxillary sinus. The  bony calvarium and bones of the skull base appear intact.       Impression    IMPRESSION: Evolving right MCA territory infarcts primarily involving  the basal ganglia and right temporo-occipital lobes. There is regional  edema in the areas of infarct and slight mass effect on the right  lateral ventricle. These appear similar to head CT 7/14/2022. No  significant herniation. No hydrocephalus. No new intracranial  hemorrhage.      YOCASTA DENG MD         SYSTEM ID:  [id number]   US Abdomen Limited    Narrative    US ABDOMEN LIMITED 7/28/2022 3:13 PM    CLINICAL HISTORY: Evaluate for abscess, right mid/lower back skin fold  (area of wound).    TECHNIQUE: Limited abdominal ultrasound.    COMPARISON: None.    FINDINGS:    No subcutaneous abscess demonstrated in the region of the wound.      Impression    IMPRESSION:  No abscess demonstrated.    NAHOMY SILVERMAN MD         SYSTEM ID:  [id number]   US Pelvis Complete without Transvaginal    Narrative    EXAM: US PELVIC TRANSABDOMINAL  LOCATION: [facility]  DATE/TIME: 10/8/2022 8:00 AM    INDICATION: Vaginal bleeding  COMPARISON: None.  TECHNIQUE: Transabdominal scans were performed.    FINDINGS:    UTERUS: 9.4 x 4.6 x 3.3 cm. No fibroids.    ENDOMETRIUM: 14 mm. Abnormal echogenic thickening of the endometrium    RIGHT OVARY: Not visualized, likely obscured by bowel gas.    LEFT OVARY: Not visualized, likely obscured by bowel gas.    No significant free fluid.      Impression    IMPRESSION:  1.  Abnormal endometrial thickening  to 14 mm. Consider gynecological consultation for endometrial sampling.         XR Abdomen 2 Views    Narrative    EXAM: XR ABDOMEN 2 VIEWS  LOCATION: [facility]  DATE/TIME: 10/29/2022 3:46 PM    INDICATION: emesis; r o ileus obstruction  COMPARISON: None.      Impression    IMPRESSION: Air-filled loops of small and large bowel. Normal bowel gas pattern versus developing ileus. Postoperative changes of bilateral hip arthroplasty. No free air.   XR Chest 1 View    Narrative    EXAM: XR CHEST 1 VIEW  LOCATION: [facility]  DATE/TIME: 10/29/2022 3:47 PM    INDICATION: emesis; r o aspiration  COMPARISON: 07/13/2022      Impression    IMPRESSION: The heart is borderline in size. Bibasilar atelectasis, greater on the left than the right. No pleural effusion or pneumothorax.   XR Chest Port 1 View    Addendum: 11/16/2022    Additional history includes fever.    NAHOMY SILVERMAN MD         SYSTEM ID:  [id number]      Narrative    CHEST ONE VIEW October 31, 2022 11:45 AM     HISTORY: New concern for sepsis.    COMPARISON: October 29, 2022.      Impression    IMPRESSION: No acute disease.    NAHOMY SILVERMAN MD         SYSTEM ID:  [id number]   XR Femur Port Left 2 Views    Narrative    EXAM: XR PELVIS PORT 1/2 VIEWS, XR FEMUR PORT LEFT 2 VIEWS  LOCATION: [facility]  DATE/TIME: 10/31/2022 7:01 PM    INDICATION: Pain, cellulitis  COMPARISON: 03/20/2019      Impression    IMPRESSION:   Pelvis: Visualized bilateral hip arthroplasty stable. No acute fracture. No evidence of osteomyelitis.     Femur: No fracture. No evidence of osteomyelitis mild degenerative arthritis left knee. Artifact from bandaging or garment over the left thigh.   XR Pelvis Port 1/2 Views    Narrative    EXAM: XR PELVIS PORT 1/2 VIEWS, XR FEMUR PORT LEFT 2 VIEWS  LOCATION: [facility]  DATE/TIME: 10/31/2022 7:01 PM    INDICATION: Pain, cellulitis  COMPARISON:  03/20/2019      Impression    IMPRESSION:   Pelvis: Visualized bilateral hip arthroplasty stable. No acute fracture. No evidence of osteomyelitis.     Femur: No fracture. No evidence of osteomyelitis mild degenerative arthritis left knee. Artifact from bandaging or garment over the left thigh.   XR Abdomen Port 1 View    Narrative    EXAM: XR ABDOMEN PORT 1 VIEW  LOCATION: [facility]  DATE/TIME: 11/1/2022 9:08 PM    INDICATION: Emesis.  COMPARISON: CT abdomen pelvis 11/16/2021.      Impression    IMPRESSION: Nonobstructive bowel gas pattern. No pneumatosis. Supine view limits evaluation for pneumoperitoneum. Bilateral hip arthroplasty hardware.   CT Abdomen Pelvis w/o Contrast    Narrative    EXAM: CT ABDOMEN PELVIS W/O CONTRAST  LOCATION: [facility]  DATE/TIME: 11/2/2022 5:45 PM    INDICATION: Septic shock of unknown source.    COMPARISON: Abdominal radiograph 11/1/2022, pelvic ultrasound 10/8/2022, CT CAP 11/6/2021.    TECHNIQUE: CT scan of the abdomen and pelvis was performed without IV contrast. Multiplanar reformats were obtained. Dose reduction techniques were used.    CONTRAST: None.    FINDINGS:   LOWER CHEST: Mild cardiac enlargement with mild interstitial edema, bronchial wall edema, mild posterior bibasilar consolidation and/or volume loss, and trace bilateral pleural effusions.    HEPATOBILIARY: Liver is normal. Several small calcified stones in the minimally distended gallbladder. No bile duct dilatation.    PANCREAS: Normal.    SPLEEN: Spleen size normal.    ADRENAL GLANDS: Normal.    KIDNEY/BLADDER: Carrasquillo catheter appears to be well positioned within the decompressed bladder. Kidneys, ureters and bladder are otherwise normal.    BOWEL: No bowel obstruction or inflammatory change. No free air. No free fluid.    LYMPH NODES: No lymphadenopathy.    VASCULATURE: Normal caliber abdominal aorta with mild calcified atheromatous plaque.      PELVIC ORGANS:  No pelvic mass or fluid.    MUSCULOSKELETAL: Bilateral hip arthroplasties. Bones appear osteoporotic. Advanced spondylotic change throughout the lumbar spine. No bone lesion or destructive bony process.      Impression    IMPRESSION:   1.  Several small calcified stones in the minimally distended gallbladder.   2.  Mild cardiac enlargement with mild interstitial edema, bronchial wall edema, mild posterior bibasilar consolidation and/or volume loss, and trace bilateral pleural effusions.  3.  Abdomen and pelvis otherwise unremarkable.     CT Femur Thigh Bilateral wo Contrast    Narrative    EXAM: CT FEMUR THIGH BILATERAL WO CONTRAST  LOCATION: [facility]  DATE/TIME: 11/2/2022 5:46 PM    INDICATION: Cellulitis. Septic shock. Pelvic pain.    COMPARISON: None.    TECHNIQUE: Noncontrast. Axial, sagittal and coronal thin-section reconstruction. Dose reduction techniques were used.     FINDINGS:   BONES:  -No fracture. Bilateral total hip arthroplasties. Advanced degenerative changes in the medial and lateral compartments of both knees.    SOFT TISSUES:  -Moderate global atrophy of the thigh and calf musculature bilaterally. No discrete fluid collection to suggest an abscess. There is some subcutaneous cellulitis along the abdominal wall and in the proximal portions of both calves. Small lipoma in the   medial portion of the right sartorius muscle in the distal thigh as seen on series 5, image 139 and series 4, image 222.      Impression    IMPRESSION:  1.  No osteomyelitis. No evidence of a discrete fluid collection to suggest an abscess.    2.  Cellulitis in the abdominal wall and both proximal calves.     Echocardiogram Complete - For age > 60 yrs     Value    LVEF  50%    Narrative    [id number]  [id number]  [id number]  [id number]^CRYSTAL^KEENAN^[facility]  Echocardiography Laboratory  [address] [place] [address]     Name: LUCIANO JONES  MRN:  [id number]  : 1945  Study Date: 2022 07:47 AM  Age: 77 yrs  Gender: Female  Patient Location: [facility]  Reason For Study: Cerebrovascular Incident  Ordering Physician: KEENAN ROJAS  Referring Physician: Manoj Daley PA-C  Performed By: Nancy Haley RDCS     BSA: 2.6 m2  Height: 67 in  Weight: 374 lb  HR: 64  BP: 146/73 mmHg  ______________________________________________________________________________  Procedure  Complete Echo Adult. Contrast Optison.  ______________________________________________________________________________  Interpretation Summary     Extremely technically challenging study due to patient body habitus, even with  the use of contrast imaging.     Left ventricular systolic function is mildly reduced. The visual ejection  fraction is estimated at 50%.  Regional wall motion abnormalities cannot be excluded due to limited  visualization.  Right ventricle is not well visualized, however it appears mild-moderately  dilated, and global systolic function is probably mildly reduced.  No significant valvular abnormalities, limited visualization/assessment.  There is no pericaridal effusion present.     There are no prior studies available for comparison.  ______________________________________________________________________________  Left Ventricle  The left ventricle is normal in size. There is normal left ventricular wall  thickness. Left ventricular systolic function is mildly reduced. The visual  ejection fraction is estimated at 50%. Grade I or early diastolic dysfunction.  Regional wall motion abnormalities cannot be excluded due to limited  visualization.     Right Ventricle  The right ventricle is mild to moderately dilated. The right ventricle is not  well visualized. Right ventricle is not well visualized, however it appears  mild-moderately dilated, and global systolic function is probably mildly  reduced.     Atria  The left atrium is mildly  dilated. Right atrium not well visualized.     Mitral Valve  The mitral valve is normal in structure and function.     Tricuspid Valve  The tricuspid valve is not well visualized, but is grossly normal. There is  trace tricuspid regurgitation. The right ventricular systolic pressure is  approximated at 22.8 mmHg plus the right atrial pressure.     Aortic Valve  The aortic valve is not well visualized. Valve morphology is not well  visualized, however it is functioning normally on Doppler interrogation.     Pulmonic Valve  The pulmonic valve is not well seen, but is grossly normal.     Vessels  The aortic root is normal size. Normal size ascending aorta. Dilation of the  inferior vena cava is present with abnormal respiratory variation in diameter.  Positive pressure ventilation.     Pericardium  There is no pericardial effusion.     Rhythm  Sinus rhythm was noted.  ______________________________________________________________________________  MMode/2D Measurements & Calculations  IVSd: 1.1 cm     LVIDd: 4.1 cm  LVIDs: 3.0 cm  LVPWd: 1.1 cm  FS: 28.2 %  LV mass(C)d: 156.7 grams  LV mass(C)dI: 59.4 grams/m2  Ao root diam: 3.1 cm  LA dimension: 4.1 cm  asc Aorta Diam: 3.2 cm  LA/Ao: 1.3  RWT: 0.55     Doppler Measurements & Calculations  MV E max dorina: 57.2 cm/sec  MV A max dorina: 73.2 cm/sec  MV E/A: 0.78  MV dec slope: 206.6 cm/sec2  PA acc time: 0.10 sec  TR max dorina: 238.9 cm/sec  TR max P.8 mmHg  E/E' avg: 10.3  Lateral E/e': 9.9  Medial E/e': 10.7     ______________________________________________________________________________  Report approved by: Morelia Curran 2022 11:12 AM               Consultations This Hospital Stay   PATIENT [facility] IP CONSULT  NEUROLOGY IP STROKE CONSULT  SPEECH LANGUAGE PATH ADULT IP CONSULT  PHARMACY IP CONSULT  PHARMACY IP CONSULT  PHARMACY IP CONSULT  PHYSICAL THERAPY ADULT IP CONSULT  OCCUPATIONAL THERAPY ADULT IP CONSULT  REHAB ADMISSIONS LIAISON IP CONSULT  CARE  MANAGEMENT / SOCIAL WORK IP CONSULT  VASCULAR ACCESS ADULT IP CONSULT  SPEECH LANGUAGE PATH ADULT IP CONSULT  SPEECH LANGUAGE PATH ADULT IP CONSULT  WOUND OSTOMY CONTINENCE NURSE  IP CONSULT  LYMPHEDEMA THERAPY IP CONSULT  VASCULAR ACCESS ADULT IP CONSULT  PHYSICAL THERAPY ADULT IP CONSULT  OCCUPATIONAL THERAPY ADULT IP CONSULT  SPEECH LANGUAGE PATH ADULT IP CONSULT  WOUND OSTOMY CONTINENCE NURSE  IP CONSULT  SURGERY GENERAL IP CONSULT  VASCULAR ACCESS ADULT IP CONSULT  CARE MANAGEMENT / SOCIAL WORK IP CONSULT  INFECTIOUS DISEASES IP CONSULT  WOUND OSTOMY CONTINENCE NURSE  IP CONSULT  PSYCHIATRY IP CONSULT  PSYCHIATRY IP CONSULT  WOUND OSTOMY CONTINENCE NURSE  IP CONSULT  WOUND OSTOMY CONTINENCE NURSE  IP CONSULT  NUTRITION SERVICES ADULT IP CONSULT  PALLIATIVE CARE ADULT IP CONSULT  VASCULAR ACCESS ADULT IP CONSULT  WOUND OSTOMY CONTINENCE NURSE  IP CONSULT  VASCULAR ACCESS ADULT IP CONSULT  OB GYN IP CONSULT  CARE MANAGEMENT / SOCIAL WORK IP CONSULT  PHARMACY TO DOSE VANCO  INFECTIOUS DISEASES IP CONSULT  VASCULAR ACCESS ADULT IP CONSULT  VASCULAR ACCESS ADULT IP CONSULT  VASCULAR ACCESS ADULT IP CONSULT  PHYSICAL THERAPY ADULT IP CONSULT  OCCUPATIONAL THERAPY ADULT IP CONSULT  PHYSICAL THERAPY ADULT IP CONSULT  LYMPHEDEMA THERAPY IP CONSULT  GYNECOLOGY IP CONSULT  OB GYN IP CONSULT  GIP INPATIENT HOSPICE ADULT CONSULT  SMOKING CESSATION PROGRAM IP CONSULT    Primary Care Physician   Manoj Daley    Time Spent on this Encounter   I, Steffen Purcell MD, personally saw the patient today and spent greater than 30 minutes discharging this patient.

## 2023-01-01 NOTE — ED AVS SNAPSHOT
[facility] [place], Emergency Department    [address] [place] [id number]    Phone:  [phone]                                       Cristy Bailey   MRN: [id number]    Department:  [facility], Emergency Department   Date of Visit:  4/30/2018           After Visit Summary Signature Page     I have received my discharge instructions, and my questions have been answered. I have discussed any challenges I see with this plan with the nurse or doctor.    ..........................................................................................................................................  Patient/Patient Representative Signature      ..........................................................................................................................................  Patient Representative Print Name and Relationship to Patient    ..................................................               ................................................  Date                                            Time    ..........................................................................................................................................  Reviewed by Signature/Title    ...................................................              ..............................................  Date                                                            Time           "[facility]                            3 DAY-2 WEEK WELL CHILD EXAM      JENNY is a 1 wk.o. old male infant.    History given by Mother, Father, Brother, and Sister    CONCERNS/QUESTIONS: Yes wants to eat all the time     Transition to Home:   Adjustment to new baby going well? Yes    BIRTH HISTORY     Reviewed Birth history in EMR: Yes   Birth History    Birth     Length: 49.5 m (162' 4.82\")     Weight: 3.19 kg (7 lb 0.5 oz)     HC 34 cm (13.39\")    Apgar     One: 9     Five: 9    Gestation Age: 40 wks    Feeding: Breast Fed    Days in Hospital: 2.0    [facility]: [facility]    Hospital Location: [facility]     40 year old mother Neg materanl labs GBS -Given Vitamin KHep B vaccines and was given Erthyrcine eye ointment given      Received Hepatitis B vaccine at birth? No    SCREENINGS      NB HEARING SCREEN: Pass   SCREEN #1: Pending   SCREEN #To be done   Selective screenings/ referral indicated? No    Mineral  Depression Scale:  I have been able to laugh and see the funny side of things.: As much as I always could  I have looked forward with enjoyment to things.: Rather less than I used to  I have blamed myself unnecessarily when things went wrong.: Yes, some of the time  I have been anxious or worried for no good reason.: Yes, sometimes  I have felt scared or panicky for no good reason.: No, not at all  Things have been getting on top of me.: No, most of the time I have coped quite well  I have been so unhappy that I have had difficulty sleeping.: Not at all  I have felt sad or miserable.: No, not at all  I have been so unhappy that I have been crying.: No, never  The thought of harming myself has occurred to me.: Never  Mineral  Depression Scale Total: 6    Bilirubin trending:   POC Results - No results found for: \"POCBILITOTTC\"  Lab Results - No results found for: \"TBILIRUBIN\"      GENERAL      NUTRITION HISTORY:   Breast feeding ,latching in office Appears hungry " post feeding   Plan : Formula or PBM post feeding every feed 1 oz until recheck weight in one week             MULTIVITAMIN: Recommended Multivitamin with 400iu of Vitamin D po qd if exclusively  or taking less than 24 oz of formula a day.    ELIMINATION:   Has 6+ wet diapers per day, and has 2  BM per day. BM is soft and yellow  in color.    SLEEP PATTERN:   Wakes on own most of the time to feed? Yes  Wakes through out the night to feed? Yes  Sleeps in crib? Yes  Sleeps with parent? No  Sleeps on back? Yes    SOCIAL HISTORY:   The patient lives at home with mother, father, and does not attend day care. Has 1 siblings.  Smokers at home? No    HISTORY     Patient's medications, allergies, past medical, surgical, social and family histories were reviewed and updated as appropriate.  No past medical history on file.  There are no problems to display for this patient.    No past surgical history on file.  Family History   Problem Relation Age of Onset    No Known Problems Mother      No current outpatient medications on file.     No current facility-administered medications for this visit.     No Known Allergies    REVIEW OF SYSTEMS      Constitutional: Afebrile, good appetite.   HENT: Negative for abnormal head shape.  Negative for any significant congestion.  Eyes: Negative for any discharge from eyes.  Respiratory: Negative for any difficulty breathing or noisy breathing.   Cardiovascular: Negative for changes in color/activity.   Gastrointestinal: Negative for vomiting or excessive spitting up, diarrhea, constipation. or blood in stool. No concerns about umbilical stump.   Genitourinary: Ample wet and poopy diapers .  Musculoskeletal: Negative for sign of arm pain or leg pain. Negative for any concerns for strength and or movement.   Skin: Negative for rash or skin infection.  Neurological: Negative for any lethargy or weakness.   Allergies: No known allergies.  Psychiatric/Behavioral: appropriate for age.  "    DEVELOPMENTAL SURVEILLANCE     Responds to sounds? Yes  Blinks in reaction to bright light? Yes  Fixes on face? Yes  Moves all extremities equally? Yes  Has periods of wakefulness? Yes  Jessica with discomfort? Yes  Calms to adult voice? Yes  Lifts head briefly when in tummy time? Yes  Keep hands in a fist? Yes    OBJECTIVE     PHYSICAL EXAM:   Reviewed vital signs and growth parameters in EMR.   Pulse 152   Temp 37.1 °C (98.7 °F) (Temporal)   Resp 42   Ht 0.495 m (1' 7.5\")   Wt 2.93 kg (6 lb 7.4 oz)   HC 34.3 cm (13.5\")   BMI 11.94 kg/m²   Length - 15 %ile (Z= -1.02) based on WHO (Boys, 0-2 years) Length-for-age data based on Length recorded on 2023.  Weight - 5 %ile (Z= -1.61) based on WHO (Boys, 0-2 years) weight-for-age data using vitals from 2023.; Change from birth weight -8%  HC - 19 %ile (Z= -0.88) based on WHO (Boys, 0-2 years) head circumference-for-age based on Head Circumference recorded on 2023.    GENERAL: This is an alert, active  in no distress.   HEAD: Normocephalic, atraumatic. Anterior fontanelle is open, soft and flat.   EYES: PERRL, positive red reflex bilaterally. No conjunctival infection or discharge.   EARS: Ears symmetric  NOSE: Nares are patent and free of congestion.  THROAT: Palate intact. Vigorous suck.  NECK: Supple, no lymphadenopathy or masses. No palpable masses on bilateral clavicles.   HEART: Regular rate and rhythm without murmur.  Femoral pulses are 2+ and equal.   LUNGS: Clear bilaterally to auscultation, no wheezes or rhonchi. No retractions, nasal flaring, or distress noted.  ABDOMEN: Normal bowel sounds, soft and non-tender without hepatomegaly or splenomegaly or masses. Umbilical cord is off . Site is dry and non-erythematous.   GENITALIA: Normal male genitalia. No hernia. normal uncircumcised penis.  MUSCULOSKELETAL: Hips have normal range of motion with negative Salinas and Ortolani. Spine is straight. Sacrum normal without dimple. Extremities " are without abnormalities. Moves all extremities well and symmetrically with normal tone.    NEURO: Normal ever, palmar grasp, rooting. Vigorous suck.  SKIN: Intact without jaundice, significant rash or birthmarks. Skin is warm, dry, and pink.     ASSESSMENT AND PLAN     1. Well Child Exam:  Healthy 1 wk.o. old  with lack than expected growth  and development. Anticipatory guidance was reviewed and age appropriate Bright Futures handout was given.   2. Return to clinic for  well child exam or as needed.  3. Immunizations given today: None unless hepatitis B not given during  stay.  4. Second PKU screen at 2 weeks.  5. Weight change: -8%  6. Safety Priority: Car safety seats, heat stroke prevention, safe sleep, safe home environment.   7 Plan to recheck weight in one week , with post feed supplement , formula is given , mother to plan to pump and feed as well   Return to clinic for any of the following:   Decreased wet or poopy diapers  Decreased feeding  Fever greater than 100.4 rectal   Baby not waking up for feeds on his own most of time.   Irritability  Lethargy  Dry sticky mouth.   Any questions or concerns.

## 2023-02-07 NOTE — PLAN OF CARE
Summary: CVA R [facility]  DATE & TIME:  9/26/22 AM shift  Cognitive Concerns/ Orientation : A&O x 2, disoriented to time and situation. Pt has very poor insight on her clinical situation. Voices wanting to be discharged home, poor understanding/remembering why she cant go home.   BEHAVIOR & AGGRESSION TOOL COLOR: Green, anxious at times. Feels better when emotional support provided.   ABNL VS/O2: VSS on RA  MOBILITY: Total cares, lift for transfer, T&R Q2h (favors left side). Pulsate mattress, BLE elevated on pillows. Kicks pillows out underneath her legs/feet, pt educated on importance of elevated rickie LE. Up to the chair x 1 this shift  PAIN MANAGEMENT: c/o of lower back pain r/t wound, repositioned for comfort. Dressing changed to lower back per plan of care. Also, reports that her legs hurt when elevated on pillows and when edema wear is in place, removed for this shift, will reapply in the evening.   DIET: Regular, very poor intake. Only takes sips of water. Not eating any solid food, Leo HERNANDEZ. brought candy M&M, she ate some. Refused eating bananas  BOWEL/BLADDER: Incontinent of B&B.  Purewick in place. No BM this shift, pt is on bowel regiment.   ABNL LAB/BG: K 3.2, K mixed with apple sauce, pt only took half of it, spit out the rest. MD aware. Encourage to eat foods high in K.   DRAIN/DEVICES:PIV SL  SKIN: BLE patty, flaky with +2 edema. Edema wear removed this shift for comfort.  Wound cares completed per WOC RN recommendations. Wound to the back with erythema, pt was scratching area around, skin and wound cares completed this shift, WOC RN re consulted. Toe nail to second toe (R foot), came off this shift, no drainage or erythema noted.   D/C DATE: Pending, palliative consult planned for (Wed at 10 AM).   Discharge Barriers: LTC placement, SW is following  OTHER IMPORTANT INFO: replacing K continues to be a problem, MD aware.    Infliximab Counseling:  I discussed with the patient the risks of infliximab including but not limited to myelosuppression, immunosuppression, autoimmune hepatitis, demyelinating diseases, lymphoma, and serious infections.  The patient understands that monitoring is required including a PPD at baseline and must alert us or the primary physician if symptoms of infection or other concerning signs are noted. Acitretin Pregnancy And Lactation Text: This medication is Pregnancy Category X and should not be given to women who are pregnant or may become pregnant in the future. This medication is excreted in breast milk. Skyrizi Pregnancy And Lactation Text: The risk during pregnancy and breastfeeding is uncertain with this medication. Imiquimod Counseling:  I discussed with the patient the risks of imiquimod including but not limited to erythema, scaling, itching, weeping, crusting, and pain.  Patient understands that the inflammatory response to imiquimod is variable from person to person and was educated regarded proper titration schedule.  If flu-like symptoms develop, patient knows to discontinue the medication and contact us. Winlevi Counseling:  I discussed with the patient the risks of topical clascoterone including but not limited to erythema, scaling, itching, and stinging. Patient voiced their understanding. Oral Minoxidil Pregnancy And Lactation Text: This medication should only be used when clearly needed if you are pregnant, attempting to become pregnant or breast feeding. Picato Pregnancy And Lactation Text: This medication is Pregnancy Category C. It is unknown if this medication is excreted in breast milk. Cosentyx Counseling:  I discussed with the patient the risks of Cosentyx including but not limited to worsening of Crohn's disease, immunosuppression, allergic reactions and infections.  The patient understands that monitoring is required including a PPD at baseline and must alert us or the primary physician if symptoms of infection or other concerning signs are noted. Olumiant Counseling: I discussed with the patient the risks of Olumiant therapy including but not limited to upper respiratory tract infections, shingles, cold sores, and nausea. Live vaccines should be avoided.  This medication has been linked to serious infections; higher rate of mortality; malignancy and lymphoproliferative disorders; major adverse cardiovascular events; thrombosis; gastrointestinal perforations; neutropenia; lymphopenia; anemia; liver enzyme elevations; and lipid elevations. Clofazimine Counseling:  I discussed with the patient the risks of clofazimine including but not limited to skin and eye pigmentation, liver damage, nausea/vomiting, gastrointestinal bleeding and allergy. Libtayo Counseling- I discussed with the patient the risks of Libtayo including but not limited to nausea, vomiting, diarrhea, and bone or muscle pain.  The patient verbalized understanding of the proper use and possible adverse effects of Libtayo.  All of the patient's questions and concerns were addressed. Cyclophosphamide Pregnancy And Lactation Text: This medication is Pregnancy Category D and it isn't considered safe during pregnancy. This medication is excreted in breast milk. Clofazimine Pregnancy And Lactation Text: This medication is Pregnancy Category C and isn't considered safe during pregnancy. It is excreted in breast milk. Taltz Counseling: I discussed with the patient the risks of ixekizumab including but not limited to immunosuppression, serious infections, worsening of inflammatory bowel disease and drug reactions.  The patient understands that monitoring is required including a PPD at baseline and must alert us or the primary physician if symptoms of infection or other concerning signs are noted. Cosentyx Pregnancy And Lactation Text: This medication is Pregnancy Category B and is considered safe during pregnancy. It is unknown if this medication is excreted in breast milk. Otezla Counseling: The side effects of Otezla were discussed with the patient, including but not limited to worsening or new depression, weight loss, diarrhea, nausea, upper respiratory tract infection, and headache. Patient instructed to call the office should any adverse effect occur.  The patient verbalized understanding of the proper use and possible adverse effects of Otezla.  All the patient's questions and concerns were addressed. Cyclosporine Counseling:  I discussed with the patient the risks of cyclosporine including but not limited to hypertension, gingival hyperplasia,myelosuppression, immunosuppression, liver damage, kidney damage, neurotoxicity, lymphoma, and serious infections. The patient understands that monitoring is required including baseline blood pressure, CBC, CMP, lipid panel and uric acid, and then 1-2 times monthly CMP and blood pressure. Terbinafine Counseling: Patient counseling regarding adverse effects of terbinafine including but not limited to headache, diarrhea, rash, upset stomach, liver function test abnormalities, itching, taste/smell disturbance, nausea, abdominal pain, and flatulence.  There is a rare possibility of liver failure that can occur when taking terbinafine.  The patient understands that a baseline LFT and kidney function test may be required. The patient verbalized understanding of the proper use and possible adverse effects of terbinafine.  All of the patient's questions and concerns were addressed. Protopic Counseling: Patient may experience a mild burning sensation during topical application. Protopic is not approved in children less than 2 years of age. There have been case reports of hematologic and skin malignancies in patients using topical calcineurin inhibitors although causality is questionable. Albendazole Counseling:  I discussed with the patient the risks of albendazole including but not limited to cytopenia, kidney damage, nausea/vomiting and severe allergy.  The patient understands that this medication is being used in an off-label manner. Benzoyl Peroxide Counseling: Patient counseled that medicine may cause skin irritation and bleach clothing.  In the event of skin irritation, the patient was advised to reduce the amount of the drug applied or use it less frequently.   The patient verbalized understanding of the proper use and possible adverse effects of benzoyl peroxide.  All of the patient's questions and concerns were addressed. Tazorac Pregnancy And Lactation Text: This medication is not safe during pregnancy. It is unknown if this medication is excreted in breast milk. Hydroxychloroquine Counseling:  I discussed with the patient that a baseline ophthalmologic exam is needed at the start of therapy and every year thereafter while on therapy. A CBC may also be warranted for monitoring.  The side effects of this medication were discussed with the patient, including but not limited to agranulocytosis, aplastic anemia, seizures, rashes, retinopathy, and liver toxicity. Patient instructed to call the office should any adverse effect occur.  The patient verbalized understanding of the proper use and possible adverse effects of Plaquenil.  All the patient's questions and concerns were addressed. Tetracycline Pregnancy And Lactation Text: This medication is Pregnancy Category D and not consider safe during pregnancy. It is also excreted in breast milk. Birth Control Pills Pregnancy And Lactation Text: This medication should be avoided if pregnant and for the first 30 days post-partum. Thalidomide Pregnancy And Lactation Text: This medication is Pregnancy Category X and is absolutely contraindicated during pregnancy. It is unknown if it is excreted in breast milk. Bexarotene Counseling:  I discussed with the patient the risks of bexarotene including but not limited to hair loss, dry lips/skin/eyes, liver abnormalities, hyperlipidemia, pancreatitis, depression/suicidal ideation, photosensitivity, drug rash/allergic reactions, hypothyroidism, anemia, leukopenia, infection, cataracts, and teratogenicity.  Patient understands that they will need regular blood tests to check lipid profile, liver function tests, white blood cell count, thyroid function tests and pregnancy test if applicable. Albendazole Pregnancy And Lactation Text: This medication is Pregnancy Category C and it isn't known if it is safe during pregnancy. It is also excreted in breast milk. Stelara Counseling:  I discussed with the patient the risks of ustekinumab including but not limited to immunosuppression, malignancy, posterior leukoencephalopathy syndrome, and serious infections.  The patient understands that monitoring is required including a PPD at baseline and must alert us or the primary physician if symptoms of infection or other concerning signs are noted. Olumiant Pregnancy And Lactation Text: Based on animal studies, Olumiant may cause embryo-fetal harm when administered to pregnant women.  The medication should not be used in pregnancy.  Breastfeeding is not recommended during treatment. Minocycline Counseling: Patient advised regarding possible photosensitivity and discoloration of the teeth, skin, lips, tongue and gums.  Patient instructed to avoid sunlight, if possible.  When exposed to sunlight, patients should wear protective clothing, sunglasses, and sunscreen.  The patient was instructed to call the office immediately if the following severe adverse effects occur:  hearing changes, easy bruising/bleeding, severe headache, or vision changes.  The patient verbalized understanding of the proper use and possible adverse effects of minocycline.  All of the patient's questions and concerns were addressed. Libtayo Pregnancy And Lactation Text: This medication is contraindicated in pregnancy and when breast feeding. Winlevi Pregnancy And Lactation Text: This medication is considered safe during pregnancy and breastfeeding. Colchicine Counseling:  Patient counseled regarding adverse effects including but not limited to stomach upset (nausea, vomiting, stomach pain, or diarrhea).  Patient instructed to limit alcohol consumption while taking this medication.  Colchicine may reduce blood counts especially with prolonged use.  The patient understands that monitoring of kidney function and blood counts may be required, especially at baseline. The patient verbalized understanding of the proper use and possible adverse effects of colchicine.  All of the patient's questions and concerns were addressed. Terbinafine Pregnancy And Lactation Text: This medication is Pregnancy Category B and is considered safe during pregnancy. It is also excreted in breast milk and breast feeding isn't recommended. Cyclosporine Pregnancy And Lactation Text: This medication is Pregnancy Category C and it isn't know if it is safe during pregnancy. This medication is excreted in breast milk. Odomzo Counseling- I discussed with the patient the risks of Odomzo including but not limited to nausea, vomiting, diarrhea, constipation, weight loss, changes in the sense of taste, decreased appetite, muscle spasms, and hair loss.  The patient verbalized understanding of the proper use and possible adverse effects of Odomzo.  All of the patient's questions and concerns were addressed. Otezla Pregnancy And Lactation Text: This medication is Pregnancy Category C and it isn't known if it is safe during pregnancy. It is unknown if it is excreted in breast milk. Fluconazole Counseling:  Patient counseled regarding adverse effects of fluconazole including but not limited to headache, diarrhea, nausea, upset stomach, liver function test abnormalities, taste disturbance, and stomach pain.  There is a rare possibility of liver failure that can occur when taking fluconazole.  The patient understands that monitoring of LFTs and kidney function test may be required, especially at baseline. The patient verbalized understanding of the proper use and possible adverse effects of fluconazole.  All of the patient's questions and concerns were addressed. Drysol Counseling:  I discussed with the patient the risks of drysol/aluminum chloride including but not limited to skin rash, itching, irritation, burning. Dupixent Counseling: I discussed with the patient the risks of dupilumab including but not limited to eye infection and irritation, cold sores, injection site reactions, worsening of asthma, allergic reactions and increased risk of parasitic infection.  Live vaccines should be avoided while taking dupilumab. Dupilumab will also interact with certain medications such as warfarin and cyclosporine. The patient understands that monitoring is required and they must alert us or the primary physician if symptoms of infection or other concerning signs are noted. Topical Clindamycin Counseling: Patient counseled that this medication may cause skin irritation or allergic reactions.  In the event of skin irritation, the patient was advised to reduce the amount of the drug applied or use it less frequently.   The patient verbalized understanding of the proper use and possible adverse effects of clindamycin.  All of the patient's questions and concerns were addressed. Tranexamic Acid Counseling:  Patient advised of the small risk of bleeding problems with tranexamic acid. They were also instructed to call if they developed any nausea, vomiting or diarrhea. All of the patient's questions and concerns were addressed. Benzoyl Peroxide Pregnancy And Lactation Text: This medication is Pregnancy Category C. It is unknown if benzoyl peroxide is excreted in breast milk. Protopic Pregnancy And Lactation Text: This medication is Pregnancy Category C. It is unknown if this medication is excreted in breast milk when applied topically. Hydroxychloroquine Pregnancy And Lactation Text: This medication has been shown to cause fetal harm but it isn't assigned a Pregnancy Risk Category. There are small amounts excreted in breast milk. Spironolactone Counseling: Patient advised regarding risks of diarrhea, abdominal pain, hyperkalemia, birth defects (for female patients), liver toxicity and renal toxicity. The patient may need blood work to monitor liver and kidney function and potassium levels while on therapy. The patient verbalized understanding of the proper use and possible adverse effects of spironolactone.  All of the patient's questions and concerns were addressed. Low Dose Naltrexone Counseling- I discussed with the patient the potential risks and side effects of low dose naltrexone including but not limited to: more vivid dreams, headaches, nausea, vomiting, abdominal pain, fatigue, dizziness, and anxiety. Tranexamic Acid Pregnancy And Lactation Text: It is unknown if this medication is safe during pregnancy or breast feeding. Dupixent Pregnancy And Lactation Text: This medication likely crosses the placenta but the risk for the fetus is uncertain. This medication is excreted in breast milk. Drysol Pregnancy And Lactation Text: This medication is considered safe during pregnancy and breast feeding. Rituxan Counseling:  I discussed with the patient the risks of Rituxan infusions. Side effects can include infusion reactions, severe drug rashes including mucocutaneous reactions, reactivation of latent hepatitis and other infections and rarely progressive multifocal leukoencephalopathy.  All of the patient's questions and concerns were addressed. Topical Clindamycin Pregnancy And Lactation Text: This medication is Pregnancy Category B and is considered safe during pregnancy. It is unknown if it is excreted in breast milk. Carac Counseling:  I discussed with the patient the risks of Carac including but not limited to erythema, scaling, itching, weeping, crusting, and pain. Bexarotene Pregnancy And Lactation Text: This medication is Pregnancy Category X and should not be given to women who are pregnant or may become pregnant. This medication should not be used if you are breast feeding. Minoxidil Counseling: Minoxidil is a topical medication which can increase blood flow where it is applied. It is uncertain how this medication increases hair growth. Side effects are uncommon and include stinging and allergic reactions. Zyclara Counseling:  I discussed with the patient the risks of imiquimod including but not limited to erythema, scaling, itching, weeping, crusting, and pain.  Patient understands that the inflammatory response to imiquimod is variable from person to person and was educated regarded proper titration schedule.  If flu-like symptoms develop, patient knows to discontinue the medication and contact us. Rinvoq Counseling: I discussed with the patient the risks of Rinvoq therapy including but not limited to upper respiratory tract infections, shingles, cold sores, bronchitis, nausea, cough, fever, acne, and headache. Live vaccines should be avoided.  This medication has been linked to serious infections; higher rate of mortality; malignancy and lymphoproliferative disorders; major adverse cardiovascular events; thrombosis; thrombocytopenia, anemia, and neutropenia; lipid elevations; liver enzyme elevations; and gastrointestinal perforations. Ivermectin Counseling:  Patient instructed to take medication on an empty stomach with a full glass of water.  Patient informed of potential adverse effects including but not limited to nausea, diarrhea, dizziness, itching, and swelling of the extremities or lymph nodes.  The patient verbalized understanding of the proper use and possible adverse effects of ivermectin.  All of the patient's questions and concerns were addressed. Clindamycin Pregnancy And Lactation Text: This medication can be used in pregnancy if certain situations. Clindamycin is also present in breast milk. Azithromycin Counseling:  I discussed with the patient the risks of azithromycin including but not limited to GI upset, allergic reaction, drug rash, diarrhea, and yeast infections. Rhofade Counseling: Rhofade is a topical medication which can decrease superficial blood flow where applied. Side effects are uncommon and include stinging, redness and allergic reactions. Cimetidine Counseling:  I discussed with the patient the risks of Cimetidine including but not limited to gynecomastia, headache, diarrhea, nausea, drowsiness, arrhythmias, pancreatitis, skin rashes, psychosis, bone marrow suppression and kidney toxicity. Isotretinoin Counseling: Patient should get monthly blood tests, not donate blood, not drive at night if vision affected, not share medication, and not undergo elective surgery for 6 months after tx completed. Side effects reviewed, pt to contact office should one occur. Rinvoq Pregnancy And Lactation Text: Based on animal studies, Rinvoq may cause embryo-fetal harm when administered to pregnant women.  The medication should not be used in pregnancy.  Breastfeeding is not recommended during treatment and for 6 days after the last dose. Minoxidil Pregnancy And Lactation Text: This medication has not been assigned a Pregnancy Risk Category but animal studies failed to show danger with the topical medication. It is unknown if the medication is excreted in breast milk. Fluconazole Pregnancy And Lactation Text: This medication is Pregnancy Category C and it isn't know if it is safe during pregnancy. It is also excreted in breast milk. Spironolactone Pregnancy And Lactation Text: This medication can cause feminization of the male fetus and should be avoided during pregnancy. The active metabolite is also found in breast milk. Tremfya Counseling: I discussed with the patient the risks of guselkumab including but not limited to immunosuppression, serious infections, worsening of inflammatory bowel disease and drug reactions.  The patient understands that monitoring is required including a PPD at baseline and must alert us or the primary physician if symptoms of infection or other concerning signs are noted. Methotrexate Counseling:  Patient counseled regarding adverse effects of methotrexate including but not limited to nausea, vomiting, abnormalities in liver function tests. Patients may develop mouth sores, rash, diarrhea, and abnormalities in blood counts. The patient understands that monitoring is required including LFT's and blood counts.  There is a rare possibility of scarring of the liver and lung problems that can occur when taking methotrexate. Persistent nausea, loss of appetite, pale stools, dark urine, cough, and shortness of breath should be reported immediately. Patient advised to discontinue methotrexate treatment at least three months before attempting to become pregnant.  I discussed the need for folate supplements while taking methotrexate.  These supplements can decrease side effects during methotrexate treatment. The patient verbalized understanding of the proper use and possible adverse effects of methotrexate.  All of the patient's questions and concerns were addressed. Quinolones Counseling:  I discussed with the patient the risks of fluoroquinolones including but not limited to GI upset, allergic reaction, drug rash, diarrhea, dizziness, photosensitivity, yeast infections, liver function test abnormalities, tendonitis/tendon rupture. Oxybutynin Counseling:  I discussed with the patient the risks of oxybutynin including but not limited to skin rash, drowsiness, dry mouth, difficulty urinating, and blurred vision. Elidel Counseling: Patient may experience a mild burning sensation during topical application. Elidel is not approved in children less than 2 years of age. There have been case reports of hematologic and skin malignancies in patients using topical calcineurin inhibitors although causality is questionable. Methotrexate Pregnancy And Lactation Text: This medication is Pregnancy Category X and is known to cause fetal harm. This medication is excreted in breast milk. Enbrel Counseling:  I discussed with the patient the risks of etanercept including but not limited to myelosuppression, immunosuppression, autoimmune hepatitis, demyelinating diseases, lymphoma, and infections.  The patient understands that monitoring is required including a PPD at baseline and must alert us or the primary physician if symptoms of infection or other concerning signs are noted. Rituxan Pregnancy And Lactation Text: This medication is Pregnancy Category C and it isn't know if it is safe during pregnancy. It is unknown if this medication is excreted in breast milk but similar antibodies are known to be excreted. Low Dose Naltrexone Pregnancy And Lactation Text: Naltrexone is pregnancy category C.  There have been no adequate and well-controlled studies in pregnant women.  It should be used in pregnancy only if the potential benefit justifies the potential risk to the fetus.   Limited data indicates that naltrexone is minimally excreted into breastmilk. Topical Ketoconazole Counseling: Patient counseled that this medication may cause skin irritation or allergic reactions.  In the event of skin irritation, the patient was advised to reduce the amount of the drug applied or use it less frequently.   The patient verbalized understanding of the proper use and possible adverse effects of ketoconazole.  All of the patient's questions and concerns were addressed. Valtrex Counseling: I discussed with the patient the risks of valacyclovir including but not limited to kidney damage, nausea, vomiting and severe allergy.  The patient understands that if the infection seems to be worsening or is not improving, they are to call. Carac Pregnancy And Lactation Text: This medication is Pregnancy Category X and contraindicated in pregnancy and in women who may become pregnant. It is unknown if this medication is excreted in breast milk. Azathioprine Counseling:  I discussed with the patient the risks of azathioprine including but not limited to myelosuppression, immunosuppression, hepatotoxicity, lymphoma, and infections.  The patient understands that monitoring is required including baseline LFTs, Creatinine, possible TPMP genotyping and weekly CBCs for the first month and then every 2 weeks thereafter.  The patient verbalized understanding of the proper use and possible adverse effects of azathioprine.  All of the patient's questions and concerns were addressed. Doxycycline Counseling:  Patient counseled regarding possible photosensitivity and increased risk for sunburn.  Patient instructed to avoid sunlight, if possible.  When exposed to sunlight, patients should wear protective clothing, sunglasses, and sunscreen.  The patient was instructed to call the office immediately if the following severe adverse effects occur:  hearing changes, easy bruising/bleeding, severe headache, or vision changes.  The patient verbalized understanding of the proper use and possible adverse effects of doxycycline.  All of the patient's questions and concerns were addressed. Isotretinoin Pregnancy And Lactation Text: This medication is Pregnancy Category X and is considered extremely dangerous during pregnancy. It is unknown if it is excreted in breast milk. Griseofulvin Counseling:  I discussed with the patient the risks of griseofulvin including but not limited to photosensitivity, cytopenia, liver damage, nausea/vomiting and severe allergy.  The patient understands that this medication is best absorbed when taken with a fatty meal (e.g., ice cream or french fries). Siliq Counseling:  I discussed with the patient the risks of Siliq including but not limited to new or worsening depression, suicidal thoughts and behavior, immunosuppression, malignancy, posterior leukoencephalopathy syndrome, and serious infections.  The patient understands that monitoring is required including a PPD at baseline and must alert us or the primary physician if symptoms of infection or other concerning signs are noted. There is also a special program designed to monitor depression which is required with Siliq. Rhofade Pregnancy And Lactation Text: This medication has not been assigned a Pregnancy Risk Category. It is unknown if the medication is excreted in breast milk. Azithromycin Pregnancy And Lactation Text: This medication is considered safe during pregnancy and is also secreted in breast milk. Sotyktu Counseling:  I discussed the most common side effects of Sotyktu including: common cold, sore throat, sinus infections, cold sores, canker sores, folliculitis, and acne.  I also discussed more serious side effects of Sotyktu including but not limited to: serious allergic reactions; increased risk for infections such as TB; cancers such as lymphomas; rhabdomyolysis and elevated CPK; and elevated triglycerides and liver enzymes.  Mirvaso Counseling: Mirvaso is a topical medication which can decrease superficial blood flow where applied. Side effects are uncommon and include stinging, redness and allergic reactions. Dapsone Counseling: I discussed with the patient the risks of dapsone including but not limited to hemolytic anemia, agranulocytosis, rashes, methemoglobinemia, kidney failure, peripheral neuropathy, headaches, GI upset, and liver toxicity.  Patients who start dapsone require monitoring including baseline LFTs and weekly CBCs for the first month, then every month thereafter.  The patient verbalized understanding of the proper use and possible adverse effects of dapsone.  All of the patient's questions and concerns were addressed. Dapsone Pregnancy And Lactation Text: This medication is Pregnancy Category C and is not considered safe during pregnancy or breast feeding. Rifampin Counseling: I discussed with the patient the risks of rifampin including but not limited to liver damage, kidney damage, red-orange body fluids, nausea/vomiting and severe allergy. Propranolol Counseling:  I discussed with the patient the risks of propranolol including but not limited to low heart rate, low blood pressure, low blood sugar, restlessness and increased cold sensitivity. They should call the office if they experience any of these side effects. Xolair Counseling:  Patient informed of potential adverse effects including but not limited to fever, muscle aches, rash and allergic reactions.  The patient verbalized understanding of the proper use and possible adverse effects of Xolair.  All of the patient's questions and concerns were addressed. Adbry Counseling: I discussed with the patient the risks of tralokinumab including but not limited to eye infection and irritation, cold sores, injection site reactions, worsening of asthma, allergic reactions and increased risk of parasitic infection.  Live vaccines should be avoided while taking tralokinumab. The patient understands that monitoring is required and they must alert us or the primary physician if symptoms of infection or other concerning signs are noted. Prednisone Counseling:  I discussed with the patient the risks of prolonged use of prednisone including but not limited to weight gain, insomnia, osteoporosis, mood changes, diabetes, susceptibility to infection, glaucoma and high blood pressure.  In cases where prednisone use is prolonged, patients should be monitored with blood pressure checks, serum glucose levels and an eye exam.  Additionally, the patient may need to be placed on GI prophylaxis, PCP prophylaxis, and calcium and vitamin D supplementation and/or a bisphosphonate.  The patient verbalized understanding of the proper use and the possible adverse effects of prednisone.  All of the patient's questions and concerns were addressed. Solaraze Counseling:  I discussed with the patient the risks of Solaraze including but not limited to erythema, scaling, itching, weeping, crusting, and pain. Valtrex Pregnancy And Lactation Text: this medication is Pregnancy Category B and is considered safe during pregnancy. This medication is not directly found in breast milk but it's metabolite acyclovir is present. Calcipotriene Counseling:  I discussed with the patient the risks of calcipotriene including but not limited to erythema, scaling, itching, and irritation. Niacinamide Counseling: I recommended taking niacin or niacinamide, also know as vitamin B3, twice daily. Recent evidence suggests that taking vitamin B3 (500 mg twice daily) can reduce the risk of actinic keratoses and non-melanoma skin cancers. Side effects of vitamin B3 include flushing and headache. Opioid Counseling: I discussed with the patient the potential side effects of opioids including but not limited to addiction, altered mental status, and depression. I stressed avoiding alcohol, benzodiazepines, muscle relaxants and sleep aids unless specifically okayed by a physician. The patient verbalized understanding of the proper use and possible adverse effects of opioids. All of the patient's questions and concerns were addressed. They were instructed to flush the remaining pills down the toilet if they did not need them for pain. Calcipotriene Pregnancy And Lactation Text: This medication has not been proven safe during pregnancy. It is unknown if this medication is excreted in breast milk. High Dose Vitamin A Counseling: Side effects reviewed, pt to contact office should one occur. Eucrisa Counseling: Patient may experience a mild burning sensation during topical application. Eucrisa is not approved in children less than 2 years of age. Topical Sulfur Applications Counseling: Topical Sulfur Counseling: Patient counseled that this medication may cause skin irritation or allergic reactions.  In the event of skin irritation, the patient was advised to reduce the amount of the drug applied or use it less frequently.   The patient verbalized understanding of the proper use and possible adverse effects of topical sulfur application.  All of the patient's questions and concerns were addressed. Niacinamide Pregnancy And Lactation Text: These medications are considered safe during pregnancy. Detail Level: Zone Bactrim Counseling:  I discussed with the patient the risks of sulfa antibiotics including but not limited to GI upset, allergic reaction, drug rash, diarrhea, dizziness, photosensitivity, and yeast infections.  Rarely, more serious reactions can occur including but not limited to aplastic anemia, agranulocytosis, methemoglobinemia, blood dyscrasias, liver or kidney failure, lung infiltrates or desquamative/blistering drug rashes. Griseofulvin Pregnancy And Lactation Text: This medication is Pregnancy Category X and is known to cause serious birth defects. It is unknown if this medication is excreted in breast milk but breast feeding should be avoided. Sotyktu Pregnancy And Lactation Text: There is insufficient data to evaluate whether or not Sotyktu is safe to use during pregnancy.   It is not known if Sotyktu passes into breast milk and whether or not it is safe to use when breastfeeding.   Dutasteride Counseling: Dustasteride Counseling:  I discussed with the patient the risks of use of dutasteride including but not limited to decreased libido, decreased ejaculate volume, and gynecomastia. Women who can become pregnant should not handle medication.  All of the patient's questions and concerns were addressed. Azathioprine Pregnancy And Lactation Text: This medication is Pregnancy Category D and isn't considered safe during pregnancy. It is unknown if this medication is excreted in breast milk. Doxycycline Pregnancy And Lactation Text: This medication is Pregnancy Category D and not consider safe during pregnancy. It is also excreted in breast milk but is considered safe for shorter treatment courses. Doxepin Counseling:  Patient advised that the medication is sedating and not to drive a car after taking this medication. Patient informed of potential adverse effects including but not limited to dry mouth, urinary retention, and blurry vision.  The patient verbalized understanding of the proper use and possible adverse effects of doxepin.  All of the patient's questions and concerns were addressed. Adbry Pregnancy And Lactation Text: It is unknown if this medication will adversely affect pregnancy or breast feeding. Xeljanz Counseling: I discussed with the patient the risks of Xeljanz therapy including increased risk of infection, liver issues, headache, diarrhea, or cold symptoms. Live vaccines should be avoided. They were instructed to call if they have any problems. Bactrim Pregnancy And Lactation Text: This medication is Pregnancy Category D and is known to cause fetal risk.  It is also excreted in breast milk. Cellcept Counseling:  I discussed with the patient the risks of mycophenolate mofetil including but not limited to infection/immunosuppression, GI upset, hypokalemia, hypercholesterolemia, bone marrow suppression, lymphoproliferative disorders, malignancy, GI ulceration/bleed/perforation, colitis, interstitial lung disease, kidney failure, progressive multifocal leukoencephalopathy, and birth defects.  The patient understands that monitoring is required including a baseline creatinine and regular CBC testing. In addition, patient must alert us immediately if symptoms of infection or other concerning signs are noted. Dutasteride Pregnancy And Lactation Text: This medication is absolutely contraindicated in women, especially during pregnancy and breast feeding. Feminization of male fetuses is possible if taking while pregnant. Itraconazole Counseling:  I discussed with the patient the risks of itraconazole including but not limited to liver damage, nausea/vomiting, neuropathy, and severe allergy.  The patient understands that this medication is best absorbed when taken with acidic beverages such as non-diet cola or ginger ale.  The patient understands that monitoring is required including baseline LFTs and repeat LFTs at intervals.  The patient understands that they are to contact us or the primary physician if concerning signs are noted. Use Enhanced Medication Counseling?: No Humira Counseling:  I discussed with the patient the risks of adalimumab including but not limited to myelosuppression, immunosuppression, autoimmune hepatitis, demyelinating diseases, lymphoma, and serious infections.  The patient understands that monitoring is required including a PPD at baseline and must alert us or the primary physician if symptoms of infection or other concerning signs are noted. Gabapentin Counseling: I discussed with the patient the risks of gabapentin including but not limited to dizziness, somnolence, fatigue and ataxia. Solaraze Pregnancy And Lactation Text: This medication is Pregnancy Category B and is considered safe. There is some data to suggest avoiding during the third trimester. It is unknown if this medication is excreted in breast milk. Xolair Pregnancy And Lactation Text: This medication is Pregnancy Category B and is considered safe during pregnancy. This medication is excreted in breast milk. Rifampin Pregnancy And Lactation Text: This medication is Pregnancy Category C and it isn't know if it is safe during pregnancy. It is also excreted in breast milk and should not be used if you are breast feeding. Opioid Pregnancy And Lactation Text: These medications can lead to premature delivery and should be avoided during pregnancy. These medications are also present in breast milk in small amounts. Propranolol Pregnancy And Lactation Text: This medication is Pregnancy Category C and it isn't known if it is safe during pregnancy. It is excreted in breast milk. Nsaids Counseling: NSAID Counseling: I discussed with the patient that NSAIDs should be taken with food. Prolonged use of NSAIDs can result in the development of stomach ulcers.  Patient advised to stop taking NSAIDs if abdominal pain occurs.  The patient verbalized understanding of the proper use and possible adverse effects of NSAIDs.  All of the patient's questions and concerns were addressed. Opzelura Counseling:  I discussed with the patient the risks of Opzelura including but not limited to nasopharngitis, bronchitis, ear infection, eosinophila, hives, diarrhea, folliculitis, tonsillitis, and rhinorrhea.  Taken orally, this medication has been linked to serious infections; higher rate of mortality; malignancy and lymphoproliferative disorders; major adverse cardiovascular events; thrombosis; thrombocytopenia, anemia, and neutropenia; and lipid elevations. Simponi Counseling:  I discussed with the patient the risks of golimumab including but not limited to myelosuppression, immunosuppression, autoimmune hepatitis, demyelinating diseases, lymphoma, and serious infections.  The patient understands that monitoring is required including a PPD at baseline and must alert us or the primary physician if symptoms of infection or other concerning signs are noted. Erythromycin Counseling:  I discussed with the patient the risks of erythromycin including but not limited to GI upset, allergic reaction, drug rash, diarrhea, increase in liver enzymes, and yeast infections. 5-Fu Counseling: 5-Fluorouracil Counseling:  I discussed with the patient the risks of 5-fluorouracil including but not limited to erythema, scaling, itching, weeping, crusting, and pain. Topical Sulfur Applications Pregnancy And Lactation Text: This medication is Pregnancy Category C and has an unknown safety profile during pregnancy. It is unknown if this topical medication is excreted in breast milk. Doxepin Pregnancy And Lactation Text: This medication is Pregnancy Category C and it isn't known if it is safe during pregnancy. It is also excreted in breast milk and breast feeding isn't recommended. High Dose Vitamin A Pregnancy And Lactation Text: High dose vitamin A therapy is contraindicated during pregnancy and breast feeding. Arava Counseling:  Patient counseled regarding adverse effects of Arava including but not limited to nausea, vomiting, abnormalities in liver function tests. Patients may develop mouth sores, rash, diarrhea, and abnormalities in blood counts. The patient understands that monitoring is required including LFTs and blood counts.  There is a rare possibility of scarring of the liver and lung problems that can occur when taking methotrexate. Persistent nausea, loss of appetite, pale stools, dark urine, cough, and shortness of breath should be reported immediately. Patient advised to discontinue Arava treatment and consult with a physician prior to attempting conception. The patient will have to undergo a treatment to eliminate Arava from the body prior to conception. Erythromycin Pregnancy And Lactation Text: This medication is Pregnancy Category B and is considered safe during pregnancy. It is also excreted in breast milk. Cephalexin Counseling: I counseled the patient regarding use of cephalexin as an antibiotic for prophylactic and/or therapeutic purposes. Cephalexin (commonly prescribed under brand name Keflex) is a cephalosporin antibiotic which is active against numerous classes of bacteria, including most skin bacteria. Side effects may include nausea, diarrhea, gastrointestinal upset, rash, hives, yeast infections, and in rare cases, hepatitis, kidney disease, seizures, fever, confusion, neurologic symptoms, and others. Patients with severe allergies to penicillin medications are cautioned that there is about a 10% incidence of cross-reactivity with cephalosporins. When possible, patients with penicillin allergies should use alternatives to cephalosporins for antibiotic therapy. Olanzapine Pregnancy And Lactation Text: This medication is pregnancy category C.   There are no adequate and well controlled trials with olanzapine in pregnant females.  Olanzapine should be used during pregnancy only if the potential benefit justifies the potential risk to the fetus.   In a study in lactating healthy women, olanzapine was excreted in breast milk.  It is recommended that women taking olanzapine should not breast feed. Erivedge Counseling- I discussed with the patient the risks of Erivedge including but not limited to nausea, vomiting, diarrhea, constipation, weight loss, changes in the sense of taste, decreased appetite, muscle spasms, and hair loss.  The patient verbalized understanding of the proper use and possible adverse effects of Erivedge.  All of the patient's questions and concerns were addressed. Opzelura Pregnancy And Lactation Text: There is insufficient data to evaluate drug-associated risk for major birth defects, miscarriage, or other adverse maternal or fetal outcomes.  There is a pregnancy registry that monitors pregnancy outcomes in pregnant persons exposed to the medication during pregnancy.  It is unknown if this medication is excreted in breast milk.  Do not breastfeed during treatment and for about 4 weeks after the last dose. SSKI Counseling:  I discussed with the patient the risks of SSKI including but not limited to thyroid abnormalities, metallic taste, GI upset, fever, headache, acne, arthralgias, paraesthesias, lymphadenopathy, easy bleeding, arrhythmias, and allergic reaction. Topical Retinoid counseling:  Patient advised to apply a pea-sized amount only at bedtime and wait 30 minutes after washing their face before applying.  If too drying, patient may add a non-comedogenic moisturizer. The patient verbalized understanding of the proper use and possible adverse effects of retinoids.  All of the patient's questions and concerns were addressed. Xelalanz Pregnancy And Lactation Text: This medication is Pregnancy Category D and is not considered safe during pregnancy.  The risk during breast feeding is also uncertain. Finasteride Counseling:  I discussed with the patient the risks of use of finasteride including but not limited to decreased libido, decreased ejaculate volume, gynecomastia, and depression. Women should not handle medication.  All of the patient's questions and concerns were addressed. Sarecycline Counseling: Patient advised regarding possible photosensitivity and discoloration of the teeth, skin, lips, tongue and gums.  Patient instructed to avoid sunlight, if possible.  When exposed to sunlight, patients should wear protective clothing, sunglasses, and sunscreen.  The patient was instructed to call the office immediately if the following severe adverse effects occur:  hearing changes, easy bruising/bleeding, severe headache, or vision changes.  The patient verbalized understanding of the proper use and possible adverse effects of sarecycline.  All of the patient's questions and concerns were addressed. Cimzia Counseling:  I discussed with the patient the risks of Cimzia including but not limited to immunosuppression, allergic reactions and infections.  The patient understands that monitoring is required including a PPD at baseline and must alert us or the primary physician if symptoms of infection or other concerning signs are noted. Azelaic Acid Counseling: Patient counseled that medicine may cause skin irritation and to avoid applying near the eyes.  In the event of skin irritation, the patient was advised to reduce the amount of the drug applied or use it less frequently.   The patient verbalized understanding of the proper use and possible adverse effects of azelaic acid.  All of the patient's questions and concerns were addressed. Sski Pregnancy And Lactation Text: This medication is Pregnancy Category D and isn't considered safe during pregnancy. It is excreted in breast milk. Cimzia Pregnancy And Lactation Text: This medication crosses the placenta but can be considered safe in certain situations. Cimzia may be excreted in breast milk. Ilumya Counseling: I discussed with the patient the risks of tildrakizumab including but not limited to immunosuppression, malignancy, posterior leukoencephalopathy syndrome, and serious infections.  The patient understands that monitoring is required including a PPD at baseline and must alert us or the primary physician if symptoms of infection or other concerning signs are noted. Hydroquinone Counseling:  Patient advised that medication may result in skin irritation, lightening (hypopigmentation), dryness, and burning.  In the event of skin irritation, the patient was advised to reduce the amount of the drug applied or use it less frequently.  Rarely, spots that are treated with hydroquinone can become darker (pseudoochronosis).  Should this occur, patient instructed to stop medication and call the office. The patient verbalized understanding of the proper use and possible adverse effects of hydroquinone.  All of the patient's questions and concerns were addressed. Nsaids Pregnancy And Lactation Text: These medications are considered safe up to 30 weeks gestation. It is excreted in breast milk. Hydroxyzine Counseling: Patient advised that the medication is sedating and not to drive a car after taking this medication.  Patient informed of potential adverse effects including but not limited to dry mouth, urinary retention, and blurry vision.  The patient verbalized understanding of the proper use and possible adverse effects of hydroxyzine.  All of the patient's questions and concerns were addressed. Cibinqo Counseling: I discussed with the patient the risks of Cibinqo therapy including but not limited to common cold, nausea, headache, cold sores, increased blood CPK levels, dizziness, UTIs, fatigue, acne, and vomitting. Live vaccines should be avoided.  This medication has been linked to serious infections; higher rate of mortality; malignancy and lymphoproliferative disorders; major adverse cardiovascular events; thrombosis; thrombocytopenia and lymphopenia; lipid elevations; and retinal detachment. Wartpeel Counseling:  I discussed with the patient the risks of Wartpeel including but not limited to erythema, scaling, itching, weeping, crusting, and pain. Cyclophosphamide Counseling:  I discussed with the patient the risks of cyclophosphamide including but not limited to hair loss, hormonal abnormalities, decreased fertility, abdominal pain, diarrhea, nausea and vomiting, bone marrow suppression and infection. The patient understands that monitoring is required while taking this medication. Hydroxyzine Pregnancy And Lactation Text: This medication is not safe during pregnancy and should not be taken. It is also excreted in breast milk and breast feeding isn't recommended. Picato Counseling:  I discussed with the patient the risks of Picato including but not limited to erythema, scaling, itching, weeping, crusting, and pain. Skyrizi Counseling: I discussed with the patient the risks of risankizumab-rzaa including but not limited to immunosuppression, and serious infections.  The patient understands that monitoring is required including a PPD at baseline and must alert us or the primary physician if symptoms of infection or other concerning signs are noted. Cibinqo Pregnancy And Lactation Text: It is unknown if this medication will adversely affect pregnancy or breast feeding.  You should not take this medication if you are currently pregnant or planning a pregnancy or while breastfeeding. Finasteride Pregnancy And Lactation Text: This medication is absolutely contraindicated during pregnancy. It is unknown if it is excreted in breast milk. Ketoconazole Counseling:   Patient counseled regarding improving absorption with orange juice.  Adverse effects include but are not limited to breast enlargement, headache, diarrhea, nausea, upset stomach, liver function test abnormalities, taste disturbance, and stomach pain.  There is a rare possibility of liver failure that can occur when taking ketoconazole. The patient understands that monitoring of LFTs may be required, especially at baseline. The patient verbalized understanding of the proper use and possible adverse effects of ketoconazole.  All of the patient's questions and concerns were addressed. Cephalexin Pregnancy And Lactation Text: This medication is Pregnancy Category B and considered safe during pregnancy.  It is also excreted in breast milk but can be used safely for shorter doses. Metronidazole Counseling:  I discussed with the patient the risks of metronidazole including but not limited to seizures, nausea/vomiting, a metallic taste in the mouth, nausea/vomiting and severe allergy. Glycopyrrolate Counseling:  I discussed with the patient the risks of glycopyrrolate including but not limited to skin rash, drowsiness, dry mouth, difficulty urinating, and blurred vision. Oral Minoxidil Counseling- I discussed with the patient the risks of oral minoxidil including but not limited to shortness of breath, swelling of the feet or ankles, dizziness, lightheadedness, unwanted hair growth and allergic reaction.  The patient verbalized understanding of the proper use and possible adverse effects of oral minoxidil.  All of the patient's questions and concerns were addressed. Glycopyrrolate Pregnancy And Lactation Text: This medication is Pregnancy Category B and is considered safe during pregnancy. It is unknown if it is excreted breast milk. Tetracycline Counseling: Patient counseled regarding possible photosensitivity and increased risk for sunburn.  Patient instructed to avoid sunlight, if possible.  When exposed to sunlight, patients should wear protective clothing, sunglasses, and sunscreen.  The patient was instructed to call the office immediately if the following severe adverse effects occur:  hearing changes, easy bruising/bleeding, severe headache, or vision changes.  The patient verbalized understanding of the proper use and possible adverse effects of tetracycline.  All of the patient's questions and concerns were addressed. Patient understands to avoid pregnancy while on therapy due to potential birth defects. Thalidomide Counseling: I discussed with the patient the risks of thalidomide including but not limited to birth defects, anxiety, weakness, chest pain, dizziness, cough and severe allergy. Metronidazole Pregnancy And Lactation Text: This medication is Pregnancy Category B and considered safe during pregnancy.  It is also excreted in breast milk. Birth Control Pills Counseling: Birth Control Pill Counseling: I discussed with the patient the potential side effects of OCPs including but not limited to increased risk of stroke, heart attack, thrombophlebitis, deep venous thrombosis, hepatic adenomas, breast changes, GI upset, headaches, and depression.  The patient verbalized understanding of the proper use and possible adverse effects of OCPs. All of the patient's questions and concerns were addressed. Clindamycin Counseling: I counseled the patient regarding use of clindamycin as an antibiotic for prophylactic and/or therapeutic purposes. Clindamycin is active against numerous classes of bacteria, including skin bacteria. Side effects may include nausea, diarrhea, gastrointestinal upset, rash, hives, yeast infections, and in rare cases, colitis. Ketoconazole Pregnancy And Lactation Text: This medication is Pregnancy Category C and it isn't know if it is safe during pregnancy. It is also excreted in breast milk and breast feeding isn't recommended. Tazorac Counseling:  Patient advised that medication is irritating and drying.  Patient may need to apply sparingly and wash off after an hour before eventually leaving it on overnight.  The patient verbalized understanding of the proper use and possible adverse effects of tazorac.  All of the patient's questions and concerns were addressed. Olanzapine Counseling- I discussed with the patient the common side effects of olanzapine including but are not limited to: lack of energy, dry mouth, increased appetite, sleepiness, tremor, constipation, dizziness, changes in behavior, or restlessness.  Explained that teenagers are more likely to experience headaches, abdominal pain, pain in the arms or legs, tiredness, and sleepiness.  Serious side effects include but are not limited: increased risk of death in elderly patients who are confused, have memory loss, or dementia-related psychosis; hyperglycemia; increased cholesterol and triglycerides; and weight gain. Acitretin Counseling:  I discussed with the patient the risks of acitretin including but not limited to hair loss, dry lips/skin/eyes, liver damage, hyperlipidemia, depression/suicidal ideation, photosensitivity.  Serious rare side effects can include but are not limited to pancreatitis, pseudotumor cerebri, bony changes, clot formation/stroke/heart attack.  Patient understands that alcohol is contraindicated since it can result in liver toxicity and significantly prolong the elimination of the drug by many years.

## 2023-04-14 NOTE — PROGRESS NOTES
[facility] ICU RESPIRATORY NOTE        Date of Admission: 7/13/2022    Date of Intubation (most recent): 7/13/2022.     Reason for Mechanical Ventilation: Airway Protection.    Number of Days on Mechanical Ventilation: Initial Day.    Met Criteria for Spontaneous Breathing Trial: No.    Reason for No Spontaneous Breathing Trial: Per MD.    Significant Events Today: Patient transferred from outside hospital to [facility] ICU.     ABG Results: No lab results found in last 7 days.    Current Vent Settings: Vent Mode: CMV/AC  (Continuous Mandatory Ventilation/ Assist Control)  FiO2 (%): 40 %  Resp Rate (Set): 16 breaths/min  Tidal Volume (Set, mL): 450 mL  PEEP (cm H2O): 8 cmH2O  Resp: 15        Jeremy Fletcher, RT on 7/13/2022 at 9:41 PM   No

## 2023-11-22 NOTE — PLAN OF CARE
After review of [place] plan, cycle #6 intended for 11/28 was completed but never released.  Cycle #7 added in place of released order for cycle #6 Discharge Planner PT   PT: PT evaluation completed, tx initiated for pt POD#1 s/p L JENAE. Pt reports she had R JENAE last summer, but has not ambulated since due to L hip pain. Pt lives in a mobile home w/ SO, has a ramp, and is wc bound. Transfers only w/ max A at baseline. Pt has a walker for transfers. Pt did go to [facility] time for a short stay.   Patient plan for discharge: Pt prefers home if able.  Current status: Pt is quite anxious, pain level low at rest. Noted L ankle EHL/DF 2-/5 post op. Mod edema LLE. Mod A x 2 bed mobility w. HOB fully elevated, pt using SR and Trapeze as able. Sat EOB x 8 min, seated scoot with cues, Max A x2 supine scoot w/Trapeze. Plan to try lata stedy transfer this pm. mild hypotension.   Barriers to return to prior living situation:SO has been performing max A transfers with pt pre op, pt has a ramp, but with this 2nd hip now completed, hopeful that pt will be ambulatory very soon and relieve care giver burden.   Recommendations for discharge: TCU  Rationale for recommendations: Pt likely will be ambulatory now ( hasn't walked for 7+ months due to L hip pain), but with time should be ambulatory, and at TCU pt would get BID therapy to work on this skill, as well as transfers to  relieve the care giver burden of max A baseline.        Entered by: Sarah Bernal 02/13/2019 11:14 AM      PM appointment:  Lata steady transfer with Mod A x2 bed>wc. Pt highly anxious and fearful with extra time needed for calming, encouragement, and demonstrating. Pt had moderate pain reported, but was able to bear wt LLE. Recommended up in chair 1-2 hrs as tolerated. Nursing to use lata steady or lift system to get back to bed.

## 2024-10-08 NOTE — PLAN OF CARE
Goal Outcome Evaluation:    DATE & TIME:10/22/2022 4841-1730        Cognitive Concerns/ Orientation : Cooperative. Anxious, forgetful and tearful at time. Needs a lot of encouragement.   BEHAVIOR & AGGRESSION TOOL COLOR: Green     ABNL VS/O2: VSS on RA except  BP soft; 98/64.   MOBILITY: Assist of 2 with lift for transfer. T/R q2hr. Favors left side.   PAIN MANAGMENT: c/o back pain d/t Pt has tenderness/discopmfort BLE, enjoys hot packs to legs/feet  DIET: Regular. Poor appetite. Need encouragement.    BOWEL/BLADDER:  Urine incontinent. Continues to have very small/smear bloody discharges.   ABNL LAB/BG: NA  DRAIN/DEVICES: PIV SL  SKIN: Pale, dry and flaky. Blanchable redness to coccyx, perineum and buttock. BLE patty, elevated on pillows. Redness/excoriation to abdominal/breast/axillary folds. Has scheduled Miconazole powder, PRN.    TESTS/PROCEDURES: NA  D/C DATE: Pending LTC placement, SW is following.   Discharge Barriers: Placement                      Detail Level: Detailed Show Applicator Variable?: Yes Duration Of Freeze Thaw-Cycle (Seconds): 0 Application Tool (Optional): Liquid Nitrogen Sprayer Render Note In Bullet Format When Appropriate: No Number Of Freeze-Thaw Cycles: 1 freeze-thaw cycle Post-Care Instructions: I reviewed with the patient in detail post-care instructions. Patient is to wear sunprotection, and avoid picking at any of the treated lesions. Pt may apply Vaseline to crusted or scabbing areas. Consent: The patient's consent was obtained including but not limited to risks of crusting, scabbing, blistering, scarring, darker or lighter pigmentary change, recurrence, incomplete removal and infection.

## 2025-02-18 NOTE — PROGRESS NOTES
Outpatient Rehab    Physical Therapy Evaluation    Patient Name: Stalin Braswell  MRN: [id number]  YOB: 1948  Today's Date: 2/18/2025    Therapy Diagnosis:   Encounter Diagnoses   Name Primary?    History of fall     Gait instability Yes    Muscle weakness      Physician: Maria Teresa Bean DO    Physician Orders: Eval and Treat  Medical Diagnosis from Referral: history of fall   Evaluation Date: 2/18/2025  Authorization Period Expiration: 2-6-25-2-6-26  Plan of Care Expiration: 4-15-25  Progress Note Due: 3-18-25  Visit # / Visits authorized: 1/1    MD Follow up appointment: 3-7-25 with NP     Precautions: Diabetes and hx heart attack and stent,kidney transplant 2019 and biceps tendon repair L        Time In: 0310   Time Out: 0405  Total Time: 55   Total Billable Time: 55    Intake Outcome Measure for FOTO Survey    Therapist reviewed FOTO scores for Stalin Braswell on 2/18/2025.   FOTO report - see Media section or FOTO account episode details.     Intake Score: 69%    Precautions     Diabetes and hx heart attack and stent,kidney transplant Dec 31 2019 and biceps tendon repair L       Subjective   History of Present Illness  Stalin is a 76 y.o. male who reports to physical therapy with a chief concern of balance problem now and then. According to the patient's chart, Stalin has a past medical history of Diabetes mellitus, type 2, H/O heart artery stent, Heart attack, and Kidney disease. Stalin has a past surgical history that includes Appendectomy; Biceps tendon repair (Left); Surgical removal of exostosis of toe (Left); Cataract extraction, bilateral; kidney transplant with flap (04/2019); Coronary angioplasty with stent (04/2018); and Toe amputation.    The patient reports a medical diagnosis of History of fall.    Diagnostic tests related to this condition: None.        History of Present Condition/Illness: Pt had fall mid summer 2024 when walking dog and dog pulled him backwards and  Summary: CVA R MCA.   DATE & TIME: 0/15/22 [id number]  Cognitive Concerns/ Orientation : A&O x2,  disoriented to time and situation.  BEHAVIOR & AGGRESSION TOOL COLOR: Green  ABNL VS: VSS on RA.   MOBILITY: Up with assist of 2 with lata steady or lift, on pulsate mattress. Turn Q2hr. BLE elevated on pillows.   PAIN MANAGMENT: denies pain at this time  DIET: Regular. Needs encouragement to eat and drink, poor appetite   BOWEL/BLADDER: Incontinent of BM at times, had on small BM this shift.  ABNL LAB/BG: k 3.1, had scheduled Potassium  DRAIN/DEVICES: LUE PIV SL   TELEMETRY RHYTHM: NA  SKIN: Mepilex dressing to R flank/back Blanchable redness to coccyx/buttocks, perineum . BLLE edema 2+, Redness in folds and under the breast Miconazole powder applied.  TESTS/PROCEDURES: None  D/C DATE: Pending LTC placement.  Discharge Barriers: Placement  OTHER IMPORTANT INFO: SW following. Had minimal vaginal bleeding.       fell and landed on gravel  No falls since then Pt states he does experience balance issue if get up out of bed quickly darren if room is dark so started opening blinds slightly which helps  Pt noticed slight balance issues start about 1 year ago     Activities of Daily Living  Social history was obtained from Patient.    General Prior Level of Function Comments: no limitations  General Current Level of Function Comments: limited with quickly rising from bed or leaning over and stand up quickly       Previously independent with activities of daily living? Yes     Currently independent with activities of daily living? Yes          Previously independent with instrumental activities of daily living? Yes     Currently independent with instrumental activities of daily living? 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    Stalin Braswell  has a past surgical history that includes Appendectomy; Biceps tendon repair (Left); Surgical removal of exostosis of toe (Left); Cataract extraction, bilateral; kidney transplant with flap (04/2019); Coronary angioplasty with stent (04/2018); and Toe amputation.    Stalin has a current medication list which includes the following prescription(s): acetaminophen, albuterol, amlodipine, ammonium lactate, apixaban, aspirin, atorvastatin, clobetasol 0.05%, diphenhydramine, famotidine, folic acid, insulin  glargine,hum.rec.anlog, levothyroxine, losartan, magnesium oxide, metoprolol succinate, montelukast, mupirocin, mycophenolate, novolog flexpen u-100 insulin, novolog u-100 insulin aspart, omnipod 5 g6-g7 pods (gen 5), ozempic, prednisone, tacrolimus, tamsulosin, and tramadol.    Review of patient's allergies indicates:   Allergen Reactions    Prednisone Other (See Comments)     Pt states makes his glucose levels increase        Objective   Ocular Movement  Patient Symptoms/Response to Ocular Movement Testing: Noted R eye began to be shaky with endurance and a little slow with quick movements with gaze stabilization  R eye is a weaker eye for him           Posture                 No significant postural deficits noted     Lower Extremity Sensation  Right Lumbar/Lower Extremity Sensation  Intact: Light Touch       Left Lumbar/Lower Extremity Sensation  Intact: Light Touch       Pt denies any diabetic neuropathy         Hip Range of Motion    Flexibility testing: hamstrings:     90/90 test R 20 L 20           Ankle/Foot Range of Motion   Right Ankle/Foot   Active (deg) Passive (deg) Pain   Dorsiflexion (KE) 5       Dorsiflexion (KF)         Plantar Flexion         Ankle Inversion         Ankle Eversion         Subtalar Inversion         Subtalar Eversion         Great Toe MTP Flexion         Great Toe MTP Extension         Great Toe IP Flexion             Left Ankle/Foot   Active (deg) Passive (deg) Pain   Dorsiflexion (KE) 5       Dorsiflexion (KF)         Plantar Flexion         Ankle Inversion         Ankle Eversion         Subtalar Inversion         Subtalar Eversion         Great Toe MTP Flexion         Great Toe MTP Extension         Great Toe IP Flexion                            Hip Strength - Planes of Motion   Right Strength Right Pain Left Strength Left  Pain   Flexion (L2) 5   5     Extension 5   5     ABduction 5   5     ADduction           Internal Rotation           External Rotation               Knee  Strength   Right Strength Right Pain Left Strength Left  Pain   Flexion (S2) 5   5     Prone Flexion           Extension (L3) 5   5            Ankle/Foot Strength - Planes of Motion   Right Strength Right Pain Left Strength Left  Pain   Dorsiflexion (L4) 5   5     Plantar Flexion (S1) 5   5     Inversion 5   5     Eversion 5   5     Great Toe Flexion           Great Toe Extension (L5)           Lesser Toes Flexion           Lesser Toes Extension                     Joshua Balance  Joshua Balance Scale  1. Sitting to Standing: Able to stand without using hands and stabilize independently  2. Standing Unsupported: Able to stand safely for 2 minutes  3. Sitting with Back Unsupported but Feet Supported on Floor or on a Stool: Able to sit safely and securely for 2 minutes  4. Standing to Sitting: Sits safely with minimal use of hands  5.  Transfers: Able to transfer safely with minor use of hands  6. Standing Unsupported with Eyes Closed: Able to stand 10 seconds safely  7. Standing Unsupported with Feet Together: Able to place feet together independently and stand 1 minute safely  8. Reach Forward with Outstretched Arm While Standing: Can reach forward confidently 25 cm (10 inches)  9.  Object from Floor from a Standing Position: Able to  slipper safely and easily  10. Turning to Look Behind Over Left and Right Shoulders While Standing: Looks behind from both sides and weight shifts well  11. Turn 360 Degrees: Able to turn 360 degrees safely one side only 4 seconds or less  12. Place Alternate Foot on Step or Stool While Standing Unsupported: Able to stand independently and complete 8 steps in greater than 20 seconds  13. Standing Unsupported One Foot in Front: Able to take small step independently and hold 30 seconds  14. Standing on One Leg: Tries to lift leg unable to hold 3 seconds but remains standing independently  Joshua Balance Score: 49  Joshua Balance Fall Risk: Low    Interpretation:  41-56 = Low Fall  Risk  21-40 = Medium Fall Risk  0-20 = High Fall Risk    Gait Analysis  Gait Analysis Details  Decreased ability to toe and heel walk L as compared to R slight instability noted with turns          Treatment:    Balance/Neuromuscular Re-Education  Balance/Neuromuscular Re-Education Activity 1: Eye gaze stabilization exercises x 4 10 reps each 3-4 times a day    Assessment & Plan   Assessment  Stalin presents with a condition of Moderate complexity.   Presentation of Symptoms: Evolving  Will Comorbidities Impact Care: Yes  Diabetes and hx heart attack and stent,kidney transplant 2019 and biceps tendon repair L     Functional Limitations: Increased risk of fall, Maintaining balance, Functional mobility  Impairments: Impaired balance, Safety issue    Patient Goal for Therapy (PT): Improve balance  Prognosis: Good  Assessment Details: Pt is a 75 yo very active male who experiences loss of balance with getting up quickly from bed or from FB position with Joshua balance score of 49/56 low risk for fall with mild weakness noted with gaze stabilization.      Plan  From a physical therapy perspective, the patient would benefit from: Skilled Rehab Services    Planned therapy interventions include: Therapeutic exercise, Therapeutic activities, Neuromuscular re-education, and Gait training.            Visit Frequency: 2 times Per Week for 8 Weeks.       This plan was discussed with Patient.   Discussion participants: Agreed Upon Plan of Care             Patient's spiritual, cultural, and educational needs considered and patient agreeable to plan of care and goals.     Education  Education was done with Patient. The patient's learning style includes Demonstration, Listening, and Pictures/video. The patient Demonstrates understanding and Verbalizes understanding.                 Goals:   Active       Long term goals       Improve Joshua Balance score to 53/56       Start:  02/18/25    Expected End:  04/15/25            Improve gaze  stabilization to normal        Start:  02/18/25    Expected End:  04/15/25            Be able to arise supine to sit without episodes of loss of balance or instability       Start:  02/18/25    Expected End:  04/15/25            Restore ability to FB and then stand up without episode of loss of balance or instability        Start:  02/18/25    Expected End:  04/15/25               Short term goals        Improve Joshua Balance to 51/56       Start:  02/18/25    Expected End:  03/18/25            Improve gaze stabilization to be able to move quickly from one target to another without difficulty       Start:  02/18/25    Expected End:  03/18/25            Be able to perform HEP with minimal cueing required         Start:  02/18/25    Expected End:  03/18/25                Mary Lou Washington, PT

## 2025-02-20 NOTE — PLAN OF CARE
Goal Outcome Evaluation:       Patient A&OX2-3. VSS on RA. C/o of bilateral lower leg pain, that improved with oxycodone. Dressing to R waist crease changed. Some scant vaginal bleeding. Carrasquillo in place. Poor appetite and fluid intake. IV infusing. Turn and repositioning every 2 hrs. Patient is anxious with repositioning, re-assurance provided. Discharge pending placement. Continue to monitor.                   Quality 226: Preventive Care And Screening: Tobacco Use: Screening And Cessation Intervention: Patient screened for tobacco use and is an ex/non-smoker Detail Level: Detailed Quality 130: Documentation Of Current Medications In The Medical Record: Current Medications Documented

## (undated) DEVICE — CATH TRAY FOLEY COUDE SURESTEP 16FR W/DRN BAG LATEX A304416A

## (undated) DEVICE — BAG CLEAR TRASH 1.3M 39X33" P4040C

## (undated) DEVICE — PACK TOTAL HIP RIDGES LATEX PO15HIFSG

## (undated) DEVICE — SOL NACL 0.9% IRRIG 3000ML BAG 2B7477

## (undated) DEVICE — DRSG GAUZE 4X8"

## (undated) DEVICE — SOL WATER IRRIG 1000ML BOTTLE 2F7114

## (undated) DEVICE — LINEN ORTHO ACL PACK 5447

## (undated) DEVICE — SU ETHIBOND 2 V-37 4X30" MX69G

## (undated) DEVICE — SET HANDPIECE INTERPULSE W/COAXIAL FAN SPRAY TIP 0210118000

## (undated) DEVICE — DRSG KERLIX 4 1/2"X4YDS ROLL 6715

## (undated) DEVICE — GLOVE PROTEXIS POWDER FREE 7.5 ORTHOPEDIC 2D73ET75

## (undated) DEVICE — LINEN DRAPE 54X72" 5467

## (undated) DEVICE — DRSG ABDOMINAL 07 1/2X8" 7197D

## (undated) DEVICE — DRSG GAUZE 4X4" TRAY

## (undated) DEVICE — GLOVE PROTEXIS POWDER FREE 8.0 ORTHOPEDIC 2D73ET80

## (undated) DEVICE — SU VICRYL 2-0 CP-1 27" UND J266H

## (undated) DEVICE — DRSG ADAPTIC 3X8" 6113

## (undated) DEVICE — SPONGE LAP 18X18" X8435

## (undated) DEVICE — PREP CHLORAPREP 26ML TINTED ORANGE  260815

## (undated) DEVICE — SU VICRYL 2-0 CT-1 27" UND J259H

## (undated) DEVICE — PREP POVIDONE IODINE SOLUTION 10% 4OZ

## (undated) DEVICE — LINEN FULL SHEET 5511

## (undated) DEVICE — LINEN HALF SHEET 5512

## (undated) DEVICE — BLADE SAW SAGITTAL STRK 18X90X1.27MM HD SYS 6 6118-127-090

## (undated) DEVICE — SUCTION MANIFOLD NEPTUNE 2 SYS 4 PORT 0702-020-000

## (undated) DEVICE — ESU ELEC BLADE 6" COATED E1450-6

## (undated) DEVICE — SU VICRYL 1 MO-5 27" CR JJ80G

## (undated) DEVICE — GLOVE PROTEXIS BLUE W/NEU-THERA 8.0  2D73EB80

## (undated) DEVICE — LINEN TOWEL PACK X5 5464

## (undated) DEVICE — SU PDS II 2-0 CT-1 27" Z339H

## (undated) DEVICE — PREP SKIN SCRUB TRAY 4461A

## (undated) DEVICE — BASIN SET MINOR DISP

## (undated) DEVICE — CLEANSER WOUND DEBRIDEMENT VASHE 250ML 00313

## (undated) DEVICE — Device

## (undated) DEVICE — DRAPE CONVERTORS U-DRAPE 60X72" 8476

## (undated) DEVICE — DEVICE RETRIEVER HEWSON 71111579

## (undated) DEVICE — SU VICRYL 0 CP-1 27" J467H

## (undated) DEVICE — SU VICRYL 2-0 XLH  27" J581G

## (undated) DEVICE — SU ETHIBOND 1 CT-1 30" X425H

## (undated) DEVICE — PACK MAJOR SBA15MAFSI

## (undated) DEVICE — SU VICRYL 1 MO-4 18" J702D

## (undated) DEVICE — DRSG GAUZE 4X4" 3033

## (undated) DEVICE — CATH TRAY FOLEY SURESTEP 16FR DRAIN BAG STATOCK A899916

## (undated) DEVICE — ESU ELEC BLADE 4" COATED

## (undated) RX ORDER — HEPARIN SODIUM 1000 [USP'U]/ML
INJECTION, SOLUTION INTRAVENOUS; SUBCUTANEOUS
Status: DISPENSED
Start: 2022-01-01

## (undated) RX ORDER — GLYCOPYRROLATE 0.2 MG/ML
INJECTION INTRAMUSCULAR; INTRAVENOUS
Status: DISPENSED
Start: 2018-08-29

## (undated) RX ORDER — VERAPAMIL HYDROCHLORIDE 2.5 MG/ML
INJECTION, SOLUTION INTRAVENOUS
Status: DISPENSED
Start: 2022-01-01

## (undated) RX ORDER — LIDOCAINE HYDROCHLORIDE 10 MG/ML
INJECTION, SOLUTION EPIDURAL; INFILTRATION; INTRACAUDAL; PERINEURAL
Status: DISPENSED
Start: 2018-07-30

## (undated) RX ORDER — ONDANSETRON 2 MG/ML
INJECTION INTRAMUSCULAR; INTRAVENOUS
Status: DISPENSED
Start: 2018-08-29

## (undated) RX ORDER — FENTANYL CITRATE 50 UG/ML
INJECTION, SOLUTION INTRAMUSCULAR; INTRAVENOUS
Status: DISPENSED
Start: 2022-01-01

## (undated) RX ORDER — GLYCOPYRROLATE 0.2 MG/ML
INJECTION INTRAMUSCULAR; INTRAVENOUS
Status: DISPENSED
Start: 2018-07-30

## (undated) RX ORDER — DEXAMETHASONE SODIUM PHOSPHATE 4 MG/ML
INJECTION, SOLUTION INTRA-ARTICULAR; INTRALESIONAL; INTRAMUSCULAR; INTRAVENOUS; SOFT TISSUE
Status: DISPENSED
Start: 2018-08-29

## (undated) RX ORDER — FENTANYL CITRATE 50 UG/ML
INJECTION, SOLUTION INTRAMUSCULAR; INTRAVENOUS
Status: DISPENSED
Start: 2018-07-30

## (undated) RX ORDER — GABAPENTIN 100 MG/1
CAPSULE ORAL
Status: DISPENSED
Start: 2019-02-12

## (undated) RX ORDER — CEFAZOLIN SODIUM IN 0.9 % NACL 3 G/100 ML
INTRAVENOUS SOLUTION, PIGGYBACK (ML) INTRAVENOUS
Status: DISPENSED
Start: 2019-02-12

## (undated) RX ORDER — LIDOCAINE HYDROCHLORIDE 10 MG/ML
INJECTION, SOLUTION EPIDURAL; INFILTRATION; INTRACAUDAL; PERINEURAL
Status: DISPENSED
Start: 2018-06-29

## (undated) RX ORDER — LIDOCAINE HYDROCHLORIDE 10 MG/ML
INJECTION, SOLUTION INFILTRATION; PERINEURAL
Status: DISPENSED
Start: 2022-01-01

## (undated) RX ORDER — CEFAZOLIN SODIUM 2 G/100ML
INJECTION, SOLUTION INTRAVENOUS
Status: DISPENSED
Start: 2018-08-29

## (undated) RX ORDER — LIDOCAINE HYDROCHLORIDE 10 MG/ML
INJECTION, SOLUTION EPIDURAL; INFILTRATION; INTRACAUDAL; PERINEURAL
Status: DISPENSED
Start: 2018-08-29

## (undated) RX ORDER — FENTANYL CITRATE 50 UG/ML
INJECTION, SOLUTION INTRAMUSCULAR; INTRAVENOUS
Status: DISPENSED
Start: 2018-08-29

## (undated) RX ORDER — PROPOFOL 10 MG/ML
INJECTION, EMULSION INTRAVENOUS
Status: DISPENSED
Start: 2022-01-01

## (undated) RX ORDER — ONDANSETRON 2 MG/ML
INJECTION INTRAMUSCULAR; INTRAVENOUS
Status: DISPENSED
Start: 2019-02-12

## (undated) RX ORDER — HEPARIN SODIUM 200 [USP'U]/100ML
INJECTION, SOLUTION INTRAVENOUS
Status: DISPENSED
Start: 2022-01-01

## (undated) RX ORDER — DEXAMETHASONE SODIUM PHOSPHATE 4 MG/ML
INJECTION, SOLUTION INTRA-ARTICULAR; INTRALESIONAL; INTRAMUSCULAR; INTRAVENOUS; SOFT TISSUE
Status: DISPENSED
Start: 2018-07-30

## (undated) RX ORDER — CELECOXIB 200 MG/1
CAPSULE ORAL
Status: DISPENSED
Start: 2019-02-12

## (undated) RX ORDER — FENTANYL CITRATE 50 UG/ML
INJECTION, SOLUTION INTRAMUSCULAR; INTRAVENOUS
Status: DISPENSED
Start: 2019-02-12

## (undated) RX ORDER — HYDROMORPHONE HYDROCHLORIDE 1 MG/ML
INJECTION, SOLUTION INTRAMUSCULAR; INTRAVENOUS; SUBCUTANEOUS
Status: DISPENSED
Start: 2019-02-12

## (undated) RX ORDER — KETOROLAC TROMETHAMINE 30 MG/ML
INJECTION, SOLUTION INTRAMUSCULAR; INTRAVENOUS
Status: DISPENSED
Start: 2018-07-30

## (undated) RX ORDER — PHENYLEPHRINE HCL IN 0.9% NACL 1 MG/10 ML
SYRINGE (ML) INTRAVENOUS
Status: DISPENSED
Start: 2019-02-12

## (undated) RX ORDER — HYDROMORPHONE HYDROCHLORIDE 1 MG/ML
INJECTION, SOLUTION INTRAMUSCULAR; INTRAVENOUS; SUBCUTANEOUS
Status: DISPENSED
Start: 2018-07-30

## (undated) RX ORDER — ONDANSETRON 2 MG/ML
INJECTION INTRAMUSCULAR; INTRAVENOUS
Status: DISPENSED
Start: 2018-07-30

## (undated) RX ORDER — NEOSTIGMINE METHYLSULFATE 1 MG/ML
VIAL (ML) INJECTION
Status: DISPENSED
Start: 2018-07-30

## (undated) RX ORDER — NEOSTIGMINE METHYLSULFATE 1 MG/ML
VIAL (ML) INJECTION
Status: DISPENSED
Start: 2019-02-12

## (undated) RX ORDER — PROPOFOL 10 MG/ML
INJECTION, EMULSION INTRAVENOUS
Status: DISPENSED
Start: 2018-08-29

## (undated) RX ORDER — BUPIVACAINE HYDROCHLORIDE 5 MG/ML
INJECTION, SOLUTION EPIDURAL; INTRACAUDAL
Status: DISPENSED
Start: 2022-01-01

## (undated) RX ORDER — GLYCOPYRROLATE 0.2 MG/ML
INJECTION INTRAMUSCULAR; INTRAVENOUS
Status: DISPENSED
Start: 2019-02-12

## (undated) RX ORDER — CEFAZOLIN SODIUM 2 G/100ML
INJECTION, SOLUTION INTRAVENOUS
Status: DISPENSED
Start: 2018-07-30

## (undated) RX ORDER — KETAMINE HCL IN 0.9 % NACL 50 MG/5 ML
SYRINGE (ML) INTRAVENOUS
Status: DISPENSED
Start: 2018-08-29

## (undated) RX ORDER — PROPOFOL 10 MG/ML
INJECTION, EMULSION INTRAVENOUS
Status: DISPENSED
Start: 2018-07-30